# Patient Record
Sex: MALE | Race: WHITE | NOT HISPANIC OR LATINO | Employment: OTHER | URBAN - METROPOLITAN AREA
[De-identification: names, ages, dates, MRNs, and addresses within clinical notes are randomized per-mention and may not be internally consistent; named-entity substitution may affect disease eponyms.]

---

## 2017-01-19 PROBLEM — M54.12 CERVICAL RADICULOPATHY: Status: ACTIVE | Noted: 2017-01-19

## 2017-01-19 PROBLEM — R60.9 EDEMA: Status: ACTIVE | Noted: 2017-01-19

## 2017-01-19 PROBLEM — R41.3 LOM (LOSS OF MEMORY): Status: ACTIVE | Noted: 2017-01-19

## 2017-01-19 PROBLEM — K21.9 GERD (GASTROESOPHAGEAL REFLUX DISEASE): Status: ACTIVE | Noted: 2017-01-19

## 2017-01-19 PROBLEM — R06.02 SHORTNESS OF BREATH: Status: ACTIVE | Noted: 2017-01-19

## 2017-01-19 PROBLEM — M54.12 BRACHIAL NEURITIS: Status: ACTIVE | Noted: 2017-01-19

## 2017-01-19 PROBLEM — G40.909 SEIZURE DISORDER (HCC): Status: ACTIVE | Noted: 2017-01-19

## 2017-01-19 PROBLEM — R00.2 PALPITATIONS: Status: ACTIVE | Noted: 2017-01-19

## 2017-01-19 PROBLEM — R07.9 CHEST PAIN: Status: ACTIVE | Noted: 2017-01-19

## 2017-01-19 PROBLEM — I10 HYPERTENSION: Status: ACTIVE | Noted: 2017-01-19

## 2017-02-03 ENCOUNTER — HOSPITAL ENCOUNTER (EMERGENCY)
Facility: HOSPITAL | Age: 57
Discharge: HOME | End: 2017-02-03
Attending: EMERGENCY MEDICINE | Admitting: EMERGENCY MEDICINE

## 2017-02-03 ENCOUNTER — APPOINTMENT (OUTPATIENT)
Dept: GENERAL RADIOLOGY | Facility: HOSPITAL | Age: 57
End: 2017-02-03

## 2017-02-03 ENCOUNTER — APPOINTMENT (OUTPATIENT)
Dept: MRI IMAGING | Facility: HOSPITAL | Age: 57
End: 2017-02-03

## 2017-02-03 ENCOUNTER — APPOINTMENT (OUTPATIENT)
Dept: CT IMAGING | Facility: HOSPITAL | Age: 57
End: 2017-02-03

## 2017-02-03 VITALS
TEMPERATURE: 98.7 F | BODY MASS INDEX: 37.37 KG/M2 | HEART RATE: 80 BPM | DIASTOLIC BLOOD PRESSURE: 70 MMHG | HEIGHT: 70 IN | OXYGEN SATURATION: 98 % | RESPIRATION RATE: 16 BRPM | WEIGHT: 261 LBS | SYSTOLIC BLOOD PRESSURE: 120 MMHG

## 2017-02-03 DIAGNOSIS — G43.001 MIGRAINE WITHOUT AURA AND WITH STATUS MIGRAINOSUS, NOT INTRACTABLE: Primary | ICD-10-CM

## 2017-02-03 DIAGNOSIS — R53.1 WEAKNESS: ICD-10-CM

## 2017-02-03 DIAGNOSIS — R42 DIZZINESS: ICD-10-CM

## 2017-02-03 LAB
ALBUMIN SERPL-MCNC: 4.3 G/DL (ref 3.2–4.8)
ALBUMIN/GLOB SERPL: 1.7 G/DL (ref 1.5–2.5)
ALP SERPL-CCNC: 60 U/L (ref 25–100)
ALT SERPL W P-5'-P-CCNC: 27 U/L (ref 7–40)
ANION GAP SERPL CALCULATED.3IONS-SCNC: 11 MMOL/L (ref 3–11)
AST SERPL-CCNC: 23 U/L (ref 0–33)
BASOPHILS # BLD AUTO: 0.02 10*3/MM3 (ref 0–0.2)
BASOPHILS NFR BLD AUTO: 0.4 % (ref 0–1)
BILIRUB SERPL-MCNC: 0.6 MG/DL (ref 0.3–1.2)
BILIRUB UR QL STRIP: NEGATIVE
BUN BLD-MCNC: 13 MG/DL (ref 9–23)
BUN/CREAT SERPL: 16.3 (ref 7–25)
CALCIUM SPEC-SCNC: 9.9 MG/DL (ref 8.7–10.4)
CHLORIDE SERPL-SCNC: 103 MMOL/L (ref 99–109)
CLARITY UR: CLEAR
CO2 SERPL-SCNC: 25 MMOL/L (ref 20–31)
COLOR UR: YELLOW
CREAT BLD-MCNC: 0.8 MG/DL (ref 0.6–1.3)
DEPRECATED RDW RBC AUTO: 42.8 FL (ref 37–54)
EOSINOPHIL # BLD AUTO: 0.15 10*3/MM3 (ref 0.1–0.3)
EOSINOPHIL NFR BLD AUTO: 2.8 % (ref 0–3)
ERYTHROCYTE [DISTWIDTH] IN BLOOD BY AUTOMATED COUNT: 14 % (ref 11.3–14.5)
GFR SERPL CREATININE-BSD FRML MDRD: 100 ML/MIN/1.73
GLOBULIN UR ELPH-MCNC: 2.6 GM/DL
GLUCOSE BLD-MCNC: 194 MG/DL (ref 70–100)
GLUCOSE UR STRIP-MCNC: ABNORMAL MG/DL
HCT VFR BLD AUTO: 42.1 % (ref 38.9–50.9)
HGB BLD-MCNC: 14.6 G/DL (ref 13.1–17.5)
HGB UR QL STRIP.AUTO: NEGATIVE
HOLD SPECIMEN: NORMAL
IMM GRANULOCYTES # BLD: 0.06 10*3/MM3 (ref 0–0.03)
IMM GRANULOCYTES NFR BLD: 1.1 % (ref 0–0.6)
KETONES UR QL STRIP: ABNORMAL
LEUKOCYTE ESTERASE UR QL STRIP.AUTO: NEGATIVE
LYMPHOCYTES # BLD AUTO: 2.15 10*3/MM3 (ref 0.6–4.8)
LYMPHOCYTES NFR BLD AUTO: 40.8 % (ref 24–44)
MCH RBC QN AUTO: 29.3 PG (ref 27–31)
MCHC RBC AUTO-ENTMCNC: 34.7 G/DL (ref 32–36)
MCV RBC AUTO: 84.4 FL (ref 80–99)
MONOCYTES # BLD AUTO: 0.45 10*3/MM3 (ref 0–1)
MONOCYTES NFR BLD AUTO: 8.5 % (ref 0–12)
NEUTROPHILS # BLD AUTO: 2.44 10*3/MM3 (ref 1.5–8.3)
NEUTROPHILS NFR BLD AUTO: 46.4 % (ref 41–71)
NITRITE UR QL STRIP: NEGATIVE
PH UR STRIP.AUTO: 5.5 [PH] (ref 5–8)
PLATELET # BLD AUTO: 139 10*3/MM3 (ref 150–450)
PMV BLD AUTO: 9.2 FL (ref 6–12)
POTASSIUM BLD-SCNC: 3.9 MMOL/L (ref 3.5–5.5)
PROT SERPL-MCNC: 6.9 G/DL (ref 5.7–8.2)
PROT UR QL STRIP: NEGATIVE
RBC # BLD AUTO: 4.99 10*6/MM3 (ref 4.2–5.76)
SODIUM BLD-SCNC: 139 MMOL/L (ref 132–146)
SP GR UR STRIP: 1.04 (ref 1–1.03)
TROPONIN I SERPL-MCNC: 0 NG/ML (ref 0–0.07)
TROPONIN I SERPL-MCNC: 0 NG/ML (ref 0–0.07)
UROBILINOGEN UR QL STRIP: ABNORMAL
VALPROATE SERPL-MCNC: 56 MCG/ML (ref 50–150)
WBC NRBC COR # BLD: 5.27 10*3/MM3 (ref 3.5–10.8)
WHOLE BLOOD HOLD SPECIMEN: NORMAL
WHOLE BLOOD HOLD SPECIMEN: NORMAL

## 2017-02-03 PROCEDURE — 70450 CT HEAD/BRAIN W/O DYE: CPT

## 2017-02-03 PROCEDURE — 25010000002 DIPHENHYDRAMINE PER 50 MG: Performed by: EMERGENCY MEDICINE

## 2017-02-03 PROCEDURE — 80164 ASSAY DIPROPYLACETIC ACD TOT: CPT | Performed by: EMERGENCY MEDICINE

## 2017-02-03 PROCEDURE — 25010000002 DEXAMETHASONE PER 1 MG: Performed by: EMERGENCY MEDICINE

## 2017-02-03 PROCEDURE — 25010000002 LORAZEPAM PER 2 MG: Performed by: EMERGENCY MEDICINE

## 2017-02-03 PROCEDURE — 96376 TX/PRO/DX INJ SAME DRUG ADON: CPT

## 2017-02-03 PROCEDURE — 96375 TX/PRO/DX INJ NEW DRUG ADDON: CPT

## 2017-02-03 PROCEDURE — 84484 ASSAY OF TROPONIN QUANT: CPT

## 2017-02-03 PROCEDURE — 93005 ELECTROCARDIOGRAM TRACING: CPT | Performed by: EMERGENCY MEDICINE

## 2017-02-03 PROCEDURE — 81003 URINALYSIS AUTO W/O SCOPE: CPT | Performed by: EMERGENCY MEDICINE

## 2017-02-03 PROCEDURE — 71010 HC CHEST PA OR AP: CPT

## 2017-02-03 PROCEDURE — 96365 THER/PROPH/DIAG IV INF INIT: CPT

## 2017-02-03 PROCEDURE — 99285 EMERGENCY DEPT VISIT HI MDM: CPT

## 2017-02-03 PROCEDURE — 80053 COMPREHEN METABOLIC PANEL: CPT | Performed by: EMERGENCY MEDICINE

## 2017-02-03 PROCEDURE — 70551 MRI BRAIN STEM W/O DYE: CPT

## 2017-02-03 PROCEDURE — 25010000002 KETOROLAC TROMETHAMINE PER 15 MG: Performed by: EMERGENCY MEDICINE

## 2017-02-03 PROCEDURE — 85025 COMPLETE CBC W/AUTO DIFF WBC: CPT | Performed by: EMERGENCY MEDICINE

## 2017-02-03 PROCEDURE — 25010000002 METOCLOPRAMIDE PER 10 MG: Performed by: EMERGENCY MEDICINE

## 2017-02-03 RX ORDER — KETOROLAC TROMETHAMINE 15 MG/ML
10 INJECTION, SOLUTION INTRAMUSCULAR; INTRAVENOUS ONCE
Status: COMPLETED | OUTPATIENT
Start: 2017-02-03 | End: 2017-02-03

## 2017-02-03 RX ORDER — LORAZEPAM 2 MG/ML
0.5 INJECTION INTRAMUSCULAR ONCE
Status: COMPLETED | OUTPATIENT
Start: 2017-02-03 | End: 2017-02-03

## 2017-02-03 RX ORDER — DIPHENHYDRAMINE HYDROCHLORIDE 50 MG/ML
12.5 INJECTION INTRAMUSCULAR; INTRAVENOUS ONCE
Status: COMPLETED | OUTPATIENT
Start: 2017-02-03 | End: 2017-02-03

## 2017-02-03 RX ORDER — BUTALBITAL, ACETAMINOPHEN AND CAFFEINE 50; 325; 40 MG/1; MG/1; MG/1
1-2 TABLET ORAL EVERY 6 HOURS PRN
Qty: 12 TABLET | Refills: 0 | Status: ON HOLD | OUTPATIENT
Start: 2017-02-03 | End: 2017-04-18

## 2017-02-03 RX ORDER — METOCLOPRAMIDE HYDROCHLORIDE 5 MG/ML
5 INJECTION INTRAMUSCULAR; INTRAVENOUS ONCE
Status: COMPLETED | OUTPATIENT
Start: 2017-02-03 | End: 2017-02-03

## 2017-02-03 RX ADMIN — SODIUM CHLORIDE 500 ML: 9 INJECTION, SOLUTION INTRAVENOUS at 21:22

## 2017-02-03 RX ADMIN — LORAZEPAM 0.5 MG: 2 INJECTION INTRAMUSCULAR; INTRAVENOUS at 21:22

## 2017-02-03 RX ADMIN — DEXAMETHASONE SODIUM PHOSPHATE 5 MG: 10 INJECTION INTRAMUSCULAR; INTRAVENOUS at 21:51

## 2017-02-03 RX ADMIN — KETOROLAC TROMETHAMINE 10 MG: 15 INJECTION, SOLUTION INTRAMUSCULAR; INTRAVENOUS at 21:22

## 2017-02-03 RX ADMIN — DIPHENHYDRAMINE HYDROCHLORIDE 12.5 MG: 50 INJECTION INTRAMUSCULAR; INTRAVENOUS at 21:22

## 2017-02-03 RX ADMIN — METOCLOPRAMIDE 5 MG: 5 INJECTION, SOLUTION INTRAMUSCULAR; INTRAVENOUS at 21:21

## 2017-02-03 RX ADMIN — KETOROLAC TROMETHAMINE 10 MG: 15 INJECTION, SOLUTION INTRAMUSCULAR; INTRAVENOUS at 22:37

## 2017-02-03 NOTE — ED PROVIDER NOTES
Subjective   HPI Comments: Denzel Harding is a 56 y.o. male presenting to the ED with c/o neurologic problem. Mr. Harding reports that he had a sudden onset of generalized weakness and dizziness at 1740 earlier this afternoon. He reports that it was shortly followed by tingling and numbness on his entire left side and face. He also began having slurred speech, prompting his presentation to the ED. He is currently complaining of a headache as well. Denies any  loss of consciousness, or altered mental status. No other complaints at this time.    His last known well is at 1740 this afternoon.    Patient is a 56 y.o. male presenting with neurologic complaint.   History provided by:  Patient  Neurologic Problem   The patient's primary symptoms include focal sensory loss, focal weakness, slurred speech and weakness. The patient's pertinent negatives include no altered mental status, loss of balance, memory loss, near-syncope, syncope or visual change. This is a new problem. The current episode started today. The neurological problem developed suddenly. The last time the patient was known to be well was 2/3/2017 5:40 PM.  The problem is unchanged. There was facial, lower extremity, upper extremity and left-sided focality noted. Associated symptoms include dizziness and headaches. Pertinent negatives include no abdominal pain, back pain, chest pain, fatigue, fever, light-headedness, nausea, shortness of breath or vomiting.       Review of Systems   Constitutional: Negative for chills, fatigue and fever.   HENT: Negative for rhinorrhea.    Respiratory: Negative for cough and shortness of breath.    Cardiovascular: Negative for chest pain, leg swelling and near-syncope.   Gastrointestinal: Negative for abdominal pain, diarrhea, nausea and vomiting.   Genitourinary: Negative for dysuria.   Musculoskeletal: Negative for back pain and joint swelling.   Skin: Negative for color change.   Neurological: Positive for dizziness,  focal weakness, speech difficulty, weakness, numbness and headaches. Negative for syncope, light-headedness and loss of balance.   Psychiatric/Behavioral: Negative for memory loss.   All other systems reviewed and are negative.      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 EF of 60-65%. Grade 1 diastolic dysfunction of the LV myocardium. No evidence of pericardial effusion   • History of herniated intervertebral disc      History of left L5-S1 disc herniation   • Hypercholesterolemia 11/2/2016   • Hypertension    • LOM (loss of memory) 1/19/2017   • Lower back pain      Right   • Measles    • Obstructive sleep apnea    • Palpitations    • Seizure disorder    • Shortness of breath 1/19/2017   • SOB (shortness of breath)    • Stroke        Allergies   Allergen Reactions   • Sulfa Antibiotics Anaphylaxis, Nausea And Vomiting and Delirium   • Codeine Nausea And Vomiting     Can only take with Phenergan   • Morphine      Does not work. It causes pain       Past Surgical History   Procedure Laterality Date   • Coronary angioplasty with stent placement     • Cholecystectomy     • Knee arthroscopy Bilateral    • Carpal tunnel release Right    • Neuroplasty / transposition ulnar nerve at elbow     • Cardiac catheterization     • Other surgical history       Foraminotomy and discectomy       Family History   Problem Relation Age of Onset   • Hypertension Mother    • Alcohol abuse Father    • Heart disease Other    • Stroke Other    • Hypertension Other    • Other Other      Neurologic disorder   • Parkinsonism Other    • Stroke  Other    • Heart disease Other    • Hypertension Other    • Heart disease Other    • Stroke Other    • Hypertension Other        Social History     Social History   • Marital status:      Spouse name: N/A   • Number of children: N/A   • Years of education: N/A     Social History Main Topics   • Smoking status: Former Smoker     Quit date: 1/1/1998   • Smokeless tobacco: None   • Alcohol use Yes      Comment: 3 PER YEAR   • Drug use: No   • Sexual activity: Defer     Other Topics Concern   • None     Social History Narrative   • None         Objective   Physical Exam   Constitutional: He is oriented to person, place, and time. He appears well-developed and well-nourished. No distress.   HENT:   Head: Normocephalic and atraumatic.   Eyes: Conjunctivae and EOM are normal.   Neck: Normal range of motion. Neck supple.   Cardiovascular: Normal rate, regular rhythm, normal heart sounds and intact distal pulses.    Good pulses.   Pulmonary/Chest: Effort normal and breath sounds normal. No respiratory distress. He exhibits no tenderness.   Abdominal: Soft. Bowel sounds are normal. There is no tenderness.   Moderately obese.   Musculoskeletal: Normal range of motion. He exhibits no edema or tenderness.   Lymphadenopathy:     He has no cervical adenopathy.   Neurological: He is alert and oriented to person, place, and time.   Reflex Scores:       Patellar reflexes are 1+ on the right side and 1+ on the left side.  Face symmetric. Tongue midline. Voice strong. Vision, hearing and speech preserved.   He was able to stand up from the wheelchair without assistance, felt dizzy when he did so.  Moved both arms normally and could take shirt off without assistance, and could get onto the CT gantry.   strength symmetrical.   Skin: Skin is warm and dry. No erythema.   Psychiatric: He has a normal mood and affect. His behavior is normal.   Nursing note and vitals reviewed.      Procedures         ED Course  ED Course   Comment  By Time   Mr. Harding's MRI shows no evidence of an acute infarct, per Dr. Carmona's interpretation. David Hinds 02/03 2024                     MDM  Number of Diagnoses or Management Options  Dizziness:   Migraine without aura and with status migrainosus, not intractable:   Weakness:   Diagnosis management comments:         I reviewed all available studies at the bedside with the patient and his family.  They are reassuring.  His MRI shows no evidence of either current or previous stroke.    In review of his previous medical records and discussion with he and his family apparently this is one of many episodes he's had negative been very similar.    He has had no seizure-like activity here and his valproic acid is therapeutic.    He has a fairly severe headache at this point and I favor a complex migraine.  He is seen Dr. Shoemaker in the past for migraines.    His neurologic exam is normal currently.  I've treated him for migraines and he feels better.    His previous MRIs have been reassuring as is one today.    I will discharge him to home on seemed Fioricet and he'll continue his current medications now call Dr. Shoemaker on Monday for follow-up and also follow up with his primary care doctor for optimization of his sugar and blood pressure control.    He'll return to the ER if worse in any way.  All are agreeable with the plan.       Amount and/or Complexity of Data Reviewed  Clinical lab tests: reviewed  Tests in the radiology section of CPT®: reviewed  Tests in the medicine section of CPT®: reviewed        Final diagnoses:   Migraine without aura and with status migrainosus, not intractable   Weakness   Dizziness       Documentation assistance provided by wen Hinds.  Information recorded by the scribe was done at my direction and has been verified and validated by me.     David Hinds  02/03/17 1818       Nasir Carmona MD  02/03/17 2057

## 2017-02-04 NOTE — DISCHARGE INSTRUCTIONS

## 2017-02-21 ENCOUNTER — OFFICE VISIT (OUTPATIENT)
Dept: NEUROLOGY | Facility: CLINIC | Age: 57
End: 2017-02-21

## 2017-02-21 VITALS
BODY MASS INDEX: 37.19 KG/M2 | HEART RATE: 79 BPM | DIASTOLIC BLOOD PRESSURE: 68 MMHG | HEIGHT: 70 IN | WEIGHT: 259.8 LBS | OXYGEN SATURATION: 94 % | SYSTOLIC BLOOD PRESSURE: 118 MMHG

## 2017-02-21 DIAGNOSIS — E11.42 DIABETIC POLYNEUROPATHY ASSOCIATED WITH TYPE 2 DIABETES MELLITUS (HCC): ICD-10-CM

## 2017-02-21 DIAGNOSIS — G43.C0 PERIODIC HEADACHE SYNDROME, NOT INTRACTABLE: Primary | ICD-10-CM

## 2017-02-21 PROBLEM — G40.909 SEIZURE DISORDER (HCC): Status: RESOLVED | Noted: 2017-01-19 | Resolved: 2017-02-21

## 2017-02-21 PROCEDURE — 99214 OFFICE O/P EST MOD 30 MIN: CPT | Performed by: PSYCHIATRY & NEUROLOGY

## 2017-02-21 RX ORDER — SUMATRIPTAN 100 MG/1
TABLET, FILM COATED ORAL
Qty: 12 TABLET | Refills: 6 | Status: SHIPPED | OUTPATIENT
Start: 2017-02-21 | End: 2017-05-03

## 2017-02-21 RX ORDER — MONTELUKAST SODIUM 10 MG/1
1 TABLET ORAL DAILY
Refills: 0 | COMMUNITY
Start: 2017-02-03 | End: 2017-05-24 | Stop reason: SDUPTHER

## 2017-02-21 RX ORDER — TOPIRAMATE 25 MG/1
25 TABLET ORAL SEE ADMIN INSTRUCTIONS
Qty: 120 TABLET | Refills: 3 | Status: SHIPPED | OUTPATIENT
Start: 2017-02-21 | End: 2017-03-23

## 2017-02-21 NOTE — ASSESSMENT & PLAN NOTE
Headaches are worsening.  Medication changes per orders.   Continue Depakote ER 1500 mg qhs  Add Imitrex and TPM

## 2017-02-21 NOTE — PROGRESS NOTES
"Subjective:     Patient ID: Denzel Harding is a 56 y.o. male.    History of Present Illness     55 yo male with migraines and DMPN.  Last visit on 11/11/16 continued Depakote and GBP.      Interval history:  Presented to ED on 2/3/17 with generalized weakness, slurred speech and dizziness with N/T on left side.  Sx followed by severe HA.  Sx resolved in ED and discharged home.    MRI Brain, my review of films, atrophy with mild SVDz  VPA level 56  Gluc 194; ; ECG NSR    Migraines     Head continues to hurt with unsteady sensation.  Above right eye has sharp stabbing sensation.  Taking OTC meds with short term relief.  Moderate intensity.  Tolerating Depakote and GBP.  Took an Imitrex from [facility] with significant improvement in HA.     DMPN    Feet burning and stinging at night improved.  Working part time up on feet.     PMH:    11/12/2015 Dr Horowitz performed left L5/S1 discectomy followed by wound infection     4/22/16 presented to ED with weakness and impaired speech.  MRI Brain, my review, minimal small vessel disease.    8/3/16 right shoulder arthroscopy with labral debridement.    The following portions of the patient's history were reviewed and updated as appropriate: allergies, current medications, past medical history, past surgical history and problem list.    Review of Systems   Musculoskeletal: Positive for arthralgias and myalgias.   Neurological: Positive for numbness.        Objective:  Vitals:    02/21/17 1111   BP: 118/68   Pulse: 79   SpO2: 94%   Weight: 259 lb 12.8 oz (118 kg)   Height: 70\" (177.8 cm)         Neurologic Exam     Mental Status   Oriented to person, place, and time.   Speech: speech is normal   Level of consciousness: alert  Knowledge: good and consistent with education.   Normal comprehension.     Cranial Nerves   Cranial nerves II through XII intact.     CN II   Visual fields full to confrontation.   Visual acuity: normal  Right visual field deficit: none  Left visual field " deficit: none     CN III, IV, VI   Pupils are equal, round, and reactive to light.  Extraocular motions are normal.   Nystagmus: none   Diplopia: none  Ophthalmoparesis: none  Upgaze: normal  Downgaze: normal  Conjugate gaze: present    CN V   Facial sensation intact.   Right corneal reflex: normal  Left corneal reflex: normal    CN VII   Right facial weakness: none  Left facial weakness: none    CN VIII   Hearing: intact    CN IX, X   Palate: symmetric  Right gag reflex: normal  Left gag reflex: normal    CN XI   Right sternocleidomastoid strength: normal  Left sternocleidomastoid strength: normal    CN XII   Tongue: not atrophic  Fasciculations: absent  Tongue deviation: none    Motor Exam   Muscle bulk: normal  Overall muscle tone: normal    Strength   Strength 5/5 throughout.     Sensory Exam   Right leg light touch: decreased from knee  Left leg light touch: decreased from knee  Right leg pinprick: decreased from knee  Left leg pinprick: decreased from knee    Gait, Coordination, and Reflexes     Gait  Gait: normal    Tremor   Resting tremor: absent  Intention tremor: absent  Action tremor: absent    Reflexes   Reflexes 2+ except as noted.   Right patellar: 0  Left patellar: 0  Left achilles: 0      Physical Exam   Constitutional: He is oriented to person, place, and time.   Eyes: EOM are normal. Pupils are equal, round, and reactive to light.   Neurological: He is oriented to person, place, and time. He has normal strength. Gait normal.   Reflex Scores:       Patellar reflexes are 0 on the right side and 0 on the left side.       Achilles reflexes are 0 on the left side.  Psychiatric: His speech is normal.       Assessment/Plan:       Problems Addressed this Visit        Cardiovascular and Mediastinum    Periodic headache syndrome, not intractable - Primary     Headaches are worsening.  Medication changes per orders.   Continue Depakote ER 1500 mg qhs  Add Imitrex and TPM             Endocrine    Diabetic  polyneuropathy associated with type 2 diabetes mellitus     Stable on  mg TID

## 2017-02-23 RX ORDER — FLUNISOLIDE 0.25 MG/ML
SOLUTION NASAL
Qty: 1 BOTTLE | Refills: 0 | Status: SHIPPED | OUTPATIENT
Start: 2017-02-23 | End: 2017-06-12

## 2017-04-11 ENCOUNTER — APPOINTMENT (OUTPATIENT)
Dept: GENERAL RADIOLOGY | Facility: HOSPITAL | Age: 57
End: 2017-04-11

## 2017-04-11 ENCOUNTER — HOSPITAL ENCOUNTER (OUTPATIENT)
Facility: HOSPITAL | Age: 57
Setting detail: OBSERVATION
LOS: 1 days | Discharge: HOME | End: 2017-04-18
Attending: EMERGENCY MEDICINE | Admitting: INTERNAL MEDICINE

## 2017-04-11 ENCOUNTER — APPOINTMENT (OUTPATIENT)
Dept: MRI IMAGING | Facility: HOSPITAL | Age: 57
End: 2017-04-11

## 2017-04-11 DIAGNOSIS — Z74.09 IMPAIRED FUNCTIONAL MOBILITY, BALANCE, GAIT, AND ENDURANCE: ICD-10-CM

## 2017-04-11 DIAGNOSIS — Z74.09 IMPAIRED MOBILITY AND ADLS: ICD-10-CM

## 2017-04-11 DIAGNOSIS — R51.9 INTRACTABLE EPISODIC HEADACHE, UNSPECIFIED HEADACHE TYPE: ICD-10-CM

## 2017-04-11 DIAGNOSIS — Z78.9 IMPAIRED MOBILITY AND ADLS: ICD-10-CM

## 2017-04-11 DIAGNOSIS — R53.1 WEAKNESS GENERALIZED: Primary | ICD-10-CM

## 2017-04-11 PROBLEM — F41.9 ANXIETY: Chronic | Status: ACTIVE | Noted: 2017-04-11

## 2017-04-11 PROBLEM — E11.9 DIABETES MELLITUS (HCC): Chronic | Status: ACTIVE | Noted: 2017-04-11

## 2017-04-11 PROBLEM — M54.50 LOWER BACK PAIN: Chronic | Status: ACTIVE | Noted: 2017-04-11

## 2017-04-11 LAB
ALBUMIN SERPL-MCNC: 4.3 G/DL (ref 3.2–4.8)
ALBUMIN SERPL-MCNC: 4.3 G/DL (ref 3.2–4.8)
ALBUMIN/GLOB SERPL: 1.7 G/DL (ref 1.5–2.5)
ALBUMIN/GLOB SERPL: 1.7 G/DL (ref 1.5–2.5)
ALP SERPL-CCNC: 43 U/L (ref 25–100)
ALP SERPL-CCNC: 44 U/L (ref 25–100)
ALT SERPL W P-5'-P-CCNC: 46 U/L (ref 7–40)
ALT SERPL W P-5'-P-CCNC: 48 U/L (ref 7–40)
ANION GAP SERPL CALCULATED.3IONS-SCNC: 4 MMOL/L (ref 3–11)
ANION GAP SERPL CALCULATED.3IONS-SCNC: 6 MMOL/L (ref 3–11)
AST SERPL-CCNC: 33 U/L (ref 0–33)
AST SERPL-CCNC: 35 U/L (ref 0–33)
BASOPHILS # BLD AUTO: 0.04 10*3/MM3 (ref 0–0.2)
BASOPHILS # BLD AUTO: 0.05 10*3/MM3 (ref 0–0.2)
BASOPHILS NFR BLD AUTO: 0.7 % (ref 0–1)
BASOPHILS NFR BLD AUTO: 0.8 % (ref 0–1)
BILIRUB SERPL-MCNC: 0.7 MG/DL (ref 0.3–1.2)
BILIRUB SERPL-MCNC: 0.7 MG/DL (ref 0.3–1.2)
BILIRUB UR QL STRIP: NEGATIVE
BNP SERPL-MCNC: 19 PG/ML (ref 0–100)
BUN BLD-MCNC: 13 MG/DL (ref 9–23)
BUN BLD-MCNC: 14 MG/DL (ref 9–23)
BUN/CREAT SERPL: 16.3 (ref 7–25)
BUN/CREAT SERPL: 17.5 (ref 7–25)
CALCIUM SPEC-SCNC: 10 MG/DL (ref 8.7–10.4)
CALCIUM SPEC-SCNC: 10 MG/DL (ref 8.7–10.4)
CHLORIDE SERPL-SCNC: 106 MMOL/L (ref 99–109)
CHLORIDE SERPL-SCNC: 109 MMOL/L (ref 99–109)
CK SERPL-CCNC: 66 U/L (ref 26–174)
CLARITY UR: CLEAR
CO2 SERPL-SCNC: 27 MMOL/L (ref 20–31)
CO2 SERPL-SCNC: 28 MMOL/L (ref 20–31)
COLOR UR: YELLOW
CREAT BLD-MCNC: 0.8 MG/DL (ref 0.6–1.3)
CREAT BLD-MCNC: 0.8 MG/DL (ref 0.6–1.3)
CRP SERPL-MCNC: 0.43 MG/DL (ref 0–1)
DEPRECATED RDW RBC AUTO: 44.5 FL (ref 37–54)
DEPRECATED RDW RBC AUTO: 44.5 FL (ref 37–54)
EOSINOPHIL # BLD AUTO: 0.22 10*3/MM3 (ref 0.1–0.3)
EOSINOPHIL # BLD AUTO: 0.23 10*3/MM3 (ref 0.1–0.3)
EOSINOPHIL NFR BLD AUTO: 3.6 % (ref 0–3)
EOSINOPHIL NFR BLD AUTO: 3.8 % (ref 0–3)
ERYTHROCYTE [DISTWIDTH] IN BLOOD BY AUTOMATED COUNT: 14 % (ref 11.3–14.5)
ERYTHROCYTE [DISTWIDTH] IN BLOOD BY AUTOMATED COUNT: 14 % (ref 11.3–14.5)
ERYTHROCYTE [SEDIMENTATION RATE] IN BLOOD: 4 MM/HR (ref 0–20)
FLUAV AG NPH QL: NEGATIVE
FLUBV AG NPH QL IA: NEGATIVE
GFR SERPL CREATININE-BSD FRML MDRD: 100 ML/MIN/1.73
GFR SERPL CREATININE-BSD FRML MDRD: 100 ML/MIN/1.73
GLOBULIN UR ELPH-MCNC: 2.5 GM/DL
GLOBULIN UR ELPH-MCNC: 2.5 GM/DL
GLUCOSE BLD-MCNC: 181 MG/DL (ref 70–100)
GLUCOSE BLD-MCNC: 190 MG/DL (ref 70–100)
GLUCOSE BLDC GLUCOMTR-MCNC: 149 MG/DL (ref 70–130)
GLUCOSE BLDC GLUCOMTR-MCNC: 209 MG/DL (ref 70–130)
GLUCOSE UR STRIP-MCNC: ABNORMAL MG/DL
HCT VFR BLD AUTO: 43.5 % (ref 38.9–50.9)
HCT VFR BLD AUTO: 43.8 % (ref 38.9–50.9)
HGB BLD-MCNC: 14.5 G/DL (ref 13.1–17.5)
HGB BLD-MCNC: 14.6 G/DL (ref 13.1–17.5)
HGB UR QL STRIP.AUTO: NEGATIVE
HOLD SPECIMEN: NORMAL
HOLD SPECIMEN: NORMAL
IMM GRANULOCYTES # BLD: 0.03 10*3/MM3 (ref 0–0.03)
IMM GRANULOCYTES # BLD: 0.04 10*3/MM3 (ref 0–0.03)
IMM GRANULOCYTES NFR BLD: 0.5 % (ref 0–0.6)
IMM GRANULOCYTES NFR BLD: 0.6 % (ref 0–0.6)
KETONES UR QL STRIP: ABNORMAL
LEUKOCYTE ESTERASE UR QL STRIP.AUTO: NEGATIVE
LIPASE SERPL-CCNC: 28 U/L (ref 6–51)
LYMPHOCYTES # BLD AUTO: 2.04 10*3/MM3 (ref 0.6–4.8)
LYMPHOCYTES # BLD AUTO: 2.33 10*3/MM3 (ref 0.6–4.8)
LYMPHOCYTES NFR BLD AUTO: 35.1 % (ref 24–44)
LYMPHOCYTES NFR BLD AUTO: 36 % (ref 24–44)
MAGNESIUM SERPL-MCNC: 2.2 MG/DL (ref 1.3–2.7)
MCH RBC QN AUTO: 29 PG (ref 27–31)
MCH RBC QN AUTO: 29.1 PG (ref 27–31)
MCHC RBC AUTO-ENTMCNC: 33.3 G/DL (ref 32–36)
MCHC RBC AUTO-ENTMCNC: 33.3 G/DL (ref 32–36)
MCV RBC AUTO: 86.9 FL (ref 80–99)
MCV RBC AUTO: 87.2 FL (ref 80–99)
MONOCYTES # BLD AUTO: 0.5 10*3/MM3 (ref 0–1)
MONOCYTES # BLD AUTO: 0.65 10*3/MM3 (ref 0–1)
MONOCYTES NFR BLD AUTO: 10 % (ref 0–12)
MONOCYTES NFR BLD AUTO: 8.6 % (ref 0–12)
NEUTROPHILS # BLD AUTO: 2.98 10*3/MM3 (ref 1.5–8.3)
NEUTROPHILS # BLD AUTO: 3.17 10*3/MM3 (ref 1.5–8.3)
NEUTROPHILS NFR BLD AUTO: 49 % (ref 41–71)
NEUTROPHILS NFR BLD AUTO: 51.3 % (ref 41–71)
NITRITE UR QL STRIP: NEGATIVE
PH UR STRIP.AUTO: 5.5 [PH] (ref 5–8)
PLATELET # BLD AUTO: 136 10*3/MM3 (ref 150–450)
PLATELET # BLD AUTO: 136 10*3/MM3 (ref 150–450)
PMV BLD AUTO: 10.1 FL (ref 6–12)
PMV BLD AUTO: 10.3 FL (ref 6–12)
POTASSIUM BLD-SCNC: 3.5 MMOL/L (ref 3.5–5.5)
POTASSIUM BLD-SCNC: 3.7 MMOL/L (ref 3.5–5.5)
PROT SERPL-MCNC: 6.8 G/DL (ref 5.7–8.2)
PROT SERPL-MCNC: 6.8 G/DL (ref 5.7–8.2)
PROT UR QL STRIP: NEGATIVE
RBC # BLD AUTO: 4.99 10*6/MM3 (ref 4.2–5.76)
RBC # BLD AUTO: 5.04 10*6/MM3 (ref 4.2–5.76)
SODIUM BLD-SCNC: 140 MMOL/L (ref 132–146)
SODIUM BLD-SCNC: 140 MMOL/L (ref 132–146)
SP GR UR STRIP: 1.02 (ref 1–1.03)
T4 FREE SERPL-MCNC: 0.99 NG/DL (ref 0.89–1.76)
TROPONIN I SERPL-MCNC: 0 NG/ML (ref 0–0.07)
TSH SERPL DL<=0.05 MIU/L-ACNC: 1.77 MIU/ML (ref 0.35–5.35)
UROBILINOGEN UR QL STRIP: ABNORMAL
VALPROATE SERPL-MCNC: 62 MCG/ML (ref 50–150)
WBC NRBC COR # BLD: 5.81 10*3/MM3 (ref 3.5–10.8)
WBC NRBC COR # BLD: 6.47 10*3/MM3 (ref 3.5–10.8)
WHOLE BLOOD HOLD SPECIMEN: NORMAL
WHOLE BLOOD HOLD SPECIMEN: NORMAL

## 2017-04-11 PROCEDURE — 25010000002 HYDROMORPHONE PER 4 MG: Performed by: HOSPITALIST

## 2017-04-11 PROCEDURE — 82962 GLUCOSE BLOOD TEST: CPT

## 2017-04-11 PROCEDURE — 80053 COMPREHEN METABOLIC PANEL: CPT | Performed by: EMERGENCY MEDICINE

## 2017-04-11 PROCEDURE — 96375 TX/PRO/DX INJ NEW DRUG ADDON: CPT

## 2017-04-11 PROCEDURE — 84484 ASSAY OF TROPONIN QUANT: CPT

## 2017-04-11 PROCEDURE — 63710000001 INSULIN LISPRO (HUMAN) PER 5 UNITS: Performed by: HOSPITALIST

## 2017-04-11 PROCEDURE — 25010000002 PROMETHAZINE PER 50 MG: Performed by: EMERGENCY MEDICINE

## 2017-04-11 PROCEDURE — G0378 HOSPITAL OBSERVATION PER HR: HCPCS

## 2017-04-11 PROCEDURE — 70551 MRI BRAIN STEM W/O DYE: CPT

## 2017-04-11 PROCEDURE — 96361 HYDRATE IV INFUSION ADD-ON: CPT

## 2017-04-11 PROCEDURE — 99285 EMERGENCY DEPT VISIT HI MDM: CPT

## 2017-04-11 PROCEDURE — 96372 THER/PROPH/DIAG INJ SC/IM: CPT

## 2017-04-11 PROCEDURE — 84439 ASSAY OF FREE THYROXINE: CPT | Performed by: EMERGENCY MEDICINE

## 2017-04-11 PROCEDURE — 80164 ASSAY DIPROPYLACETIC ACD TOT: CPT | Performed by: EMERGENCY MEDICINE

## 2017-04-11 PROCEDURE — 86038 ANTINUCLEAR ANTIBODIES: CPT | Performed by: HOSPITALIST

## 2017-04-11 PROCEDURE — 93005 ELECTROCARDIOGRAM TRACING: CPT | Performed by: EMERGENCY MEDICINE

## 2017-04-11 PROCEDURE — 82550 ASSAY OF CK (CPK): CPT | Performed by: EMERGENCY MEDICINE

## 2017-04-11 PROCEDURE — 99220 PR INITIAL OBSERVATION CARE/DAY 70 MINUTES: CPT | Performed by: HOSPITALIST

## 2017-04-11 PROCEDURE — 85652 RBC SED RATE AUTOMATED: CPT | Performed by: EMERGENCY MEDICINE

## 2017-04-11 PROCEDURE — 87804 INFLUENZA ASSAY W/OPTIC: CPT | Performed by: EMERGENCY MEDICINE

## 2017-04-11 PROCEDURE — 83735 ASSAY OF MAGNESIUM: CPT | Performed by: EMERGENCY MEDICINE

## 2017-04-11 PROCEDURE — 96376 TX/PRO/DX INJ SAME DRUG ADON: CPT

## 2017-04-11 PROCEDURE — 71010 HC CHEST PA OR AP: CPT

## 2017-04-11 PROCEDURE — 87040 BLOOD CULTURE FOR BACTERIA: CPT | Performed by: EMERGENCY MEDICINE

## 2017-04-11 PROCEDURE — 81003 URINALYSIS AUTO W/O SCOPE: CPT | Performed by: EMERGENCY MEDICINE

## 2017-04-11 PROCEDURE — 25010000002 HYDROMORPHONE PER 4 MG: Performed by: EMERGENCY MEDICINE

## 2017-04-11 PROCEDURE — 86140 C-REACTIVE PROTEIN: CPT | Performed by: EMERGENCY MEDICINE

## 2017-04-11 PROCEDURE — 83690 ASSAY OF LIPASE: CPT | Performed by: EMERGENCY MEDICINE

## 2017-04-11 PROCEDURE — 83880 ASSAY OF NATRIURETIC PEPTIDE: CPT | Performed by: EMERGENCY MEDICINE

## 2017-04-11 PROCEDURE — 63710000001 INSULIN DETEMIR PER 5 UNITS: Performed by: HOSPITALIST

## 2017-04-11 PROCEDURE — 84443 ASSAY THYROID STIM HORMONE: CPT | Performed by: EMERGENCY MEDICINE

## 2017-04-11 PROCEDURE — 25010000002 DIPHENHYDRAMINE PER 50 MG: Performed by: EMERGENCY MEDICINE

## 2017-04-11 PROCEDURE — 85025 COMPLETE CBC W/AUTO DIFF WBC: CPT | Performed by: EMERGENCY MEDICINE

## 2017-04-11 PROCEDURE — 25010000002 HEPARIN (PORCINE) PER 1000 UNITS: Performed by: HOSPITALIST

## 2017-04-11 RX ORDER — SODIUM CHLORIDE 0.9 % (FLUSH) 0.9 %
10 SYRINGE (ML) INJECTION AS NEEDED
Status: DISCONTINUED | OUTPATIENT
Start: 2017-04-11 | End: 2017-04-18 | Stop reason: HOSPADM

## 2017-04-11 RX ORDER — DIVALPROEX SODIUM 500 MG/1
1500 TABLET, EXTENDED RELEASE ORAL NIGHTLY
Status: DISCONTINUED | OUTPATIENT
Start: 2017-04-11 | End: 2017-04-12

## 2017-04-11 RX ORDER — HYDROCODONE BITARTRATE AND ACETAMINOPHEN 7.5; 325 MG/1; MG/1
1 TABLET ORAL EVERY 4 HOURS PRN
Status: DISCONTINUED | OUTPATIENT
Start: 2017-04-11 | End: 2017-04-18 | Stop reason: HOSPADM

## 2017-04-11 RX ORDER — HEPARIN SODIUM 5000 [USP'U]/ML
5000 INJECTION, SOLUTION INTRAVENOUS; SUBCUTANEOUS EVERY 8 HOURS SCHEDULED
Status: DISCONTINUED | OUTPATIENT
Start: 2017-04-11 | End: 2017-04-18 | Stop reason: HOSPADM

## 2017-04-11 RX ORDER — LISINOPRIL 20 MG/1
40 TABLET ORAL DAILY
Status: DISCONTINUED | OUTPATIENT
Start: 2017-04-11 | End: 2017-04-18 | Stop reason: HOSPADM

## 2017-04-11 RX ORDER — DIPHENHYDRAMINE HYDROCHLORIDE 50 MG/ML
25 INJECTION INTRAMUSCULAR; INTRAVENOUS ONCE
Status: COMPLETED | OUTPATIENT
Start: 2017-04-11 | End: 2017-04-11

## 2017-04-11 RX ORDER — MONTELUKAST SODIUM 10 MG/1
10 TABLET ORAL NIGHTLY
Status: DISCONTINUED | OUTPATIENT
Start: 2017-04-11 | End: 2017-04-18 | Stop reason: HOSPADM

## 2017-04-11 RX ORDER — TRAMADOL HYDROCHLORIDE 50 MG/1
50 TABLET ORAL EVERY 6 HOURS PRN
Status: DISCONTINUED | OUTPATIENT
Start: 2017-04-11 | End: 2017-04-18 | Stop reason: HOSPADM

## 2017-04-11 RX ORDER — TOPIRAMATE 100 MG/1
100 TABLET, FILM COATED ORAL DAILY
Status: ON HOLD | COMMUNITY
End: 2017-04-18

## 2017-04-11 RX ORDER — PROMETHAZINE HYDROCHLORIDE 25 MG/ML
12.5 INJECTION, SOLUTION INTRAMUSCULAR; INTRAVENOUS EVERY 6 HOURS PRN
Status: DISCONTINUED | OUTPATIENT
Start: 2017-04-11 | End: 2017-04-17

## 2017-04-11 RX ORDER — DEXTROSE MONOHYDRATE 25 G/50ML
25 INJECTION, SOLUTION INTRAVENOUS
Status: DISCONTINUED | OUTPATIENT
Start: 2017-04-11 | End: 2017-04-18 | Stop reason: HOSPADM

## 2017-04-11 RX ORDER — CLOPIDOGREL BISULFATE 75 MG/1
75 TABLET ORAL DAILY
Status: DISCONTINUED | OUTPATIENT
Start: 2017-04-11 | End: 2017-04-18 | Stop reason: HOSPADM

## 2017-04-11 RX ORDER — MORPHINE SULFATE 4 MG/ML
4 INJECTION, SOLUTION INTRAMUSCULAR; INTRAVENOUS ONCE
Status: DISCONTINUED | OUTPATIENT
Start: 2017-04-11 | End: 2017-04-18 | Stop reason: HOSPADM

## 2017-04-11 RX ORDER — PROMETHAZINE HYDROCHLORIDE 25 MG/ML
12.5 INJECTION, SOLUTION INTRAMUSCULAR; INTRAVENOUS ONCE
Status: COMPLETED | OUTPATIENT
Start: 2017-04-11 | End: 2017-04-11

## 2017-04-11 RX ORDER — SODIUM CHLORIDE 9 MG/ML
75 INJECTION, SOLUTION INTRAVENOUS CONTINUOUS
Status: DISCONTINUED | OUTPATIENT
Start: 2017-04-11 | End: 2017-04-15

## 2017-04-11 RX ORDER — GABAPENTIN 300 MG/1
300 CAPSULE ORAL EVERY 8 HOURS SCHEDULED
Status: DISCONTINUED | OUTPATIENT
Start: 2017-04-11 | End: 2017-04-18 | Stop reason: HOSPADM

## 2017-04-11 RX ORDER — ATORVASTATIN CALCIUM 40 MG/1
40 TABLET, FILM COATED ORAL NIGHTLY
Status: DISCONTINUED | OUTPATIENT
Start: 2017-04-11 | End: 2017-04-18 | Stop reason: HOSPADM

## 2017-04-11 RX ORDER — TIZANIDINE 4 MG/1
4 TABLET ORAL EVERY 6 HOURS PRN
Status: DISCONTINUED | OUTPATIENT
Start: 2017-04-11 | End: 2017-04-18 | Stop reason: HOSPADM

## 2017-04-11 RX ORDER — TOPIRAMATE 100 MG/1
100 TABLET, FILM COATED ORAL DAILY
Status: DISCONTINUED | OUTPATIENT
Start: 2017-04-11 | End: 2017-04-18 | Stop reason: HOSPADM

## 2017-04-11 RX ORDER — AMLODIPINE BESYLATE 2.5 MG/1
2.5 TABLET ORAL DAILY
Status: DISCONTINUED | OUTPATIENT
Start: 2017-04-11 | End: 2017-04-18 | Stop reason: HOSPADM

## 2017-04-11 RX ORDER — ASPIRIN 81 MG/1
81 TABLET, CHEWABLE ORAL DAILY
Status: DISCONTINUED | OUTPATIENT
Start: 2017-04-11 | End: 2017-04-18 | Stop reason: HOSPADM

## 2017-04-11 RX ORDER — ATENOLOL 25 MG/1
25 TABLET ORAL DAILY
Status: DISCONTINUED | OUTPATIENT
Start: 2017-04-11 | End: 2017-04-18 | Stop reason: HOSPADM

## 2017-04-11 RX ORDER — SODIUM CHLORIDE 0.9 % (FLUSH) 0.9 %
1-10 SYRINGE (ML) INJECTION AS NEEDED
Status: DISCONTINUED | OUTPATIENT
Start: 2017-04-11 | End: 2017-04-18 | Stop reason: HOSPADM

## 2017-04-11 RX ORDER — ONDANSETRON 2 MG/ML
4 INJECTION INTRAMUSCULAR; INTRAVENOUS EVERY 6 HOURS PRN
Status: DISCONTINUED | OUTPATIENT
Start: 2017-04-11 | End: 2017-04-18 | Stop reason: HOSPADM

## 2017-04-11 RX ORDER — NICOTINE POLACRILEX 4 MG
15 LOZENGE BUCCAL
Status: DISCONTINUED | OUTPATIENT
Start: 2017-04-11 | End: 2017-04-18 | Stop reason: HOSPADM

## 2017-04-11 RX ADMIN — ATENOLOL 25 MG: 25 TABLET ORAL at 21:21

## 2017-04-11 RX ADMIN — SODIUM CHLORIDE 1000 ML: 9 INJECTION, SOLUTION INTRAVENOUS at 16:17

## 2017-04-11 RX ADMIN — TIZANIDINE 4 MG: 4 TABLET ORAL at 21:18

## 2017-04-11 RX ADMIN — SODIUM CHLORIDE 1000 ML: 9 INJECTION, SOLUTION INTRAVENOUS at 11:00

## 2017-04-11 RX ADMIN — INSULIN DETEMIR 50 UNITS: 100 INJECTION, SOLUTION SUBCUTANEOUS at 21:18

## 2017-04-11 RX ADMIN — TOPIRAMATE 100 MG: 100 TABLET, FILM COATED ORAL at 21:30

## 2017-04-11 RX ADMIN — HYDROMORPHONE HYDROCHLORIDE 1 MG: 1 INJECTION, SOLUTION INTRAMUSCULAR; INTRAVENOUS; SUBCUTANEOUS at 15:13

## 2017-04-11 RX ADMIN — HYDROCODONE BITARTRATE AND ACETAMINOPHEN 1 TABLET: 7.5; 325 TABLET ORAL at 20:57

## 2017-04-11 RX ADMIN — INSULIN LISPRO 4 UNITS: 100 INJECTION, SOLUTION INTRAVENOUS; SUBCUTANEOUS at 21:20

## 2017-04-11 RX ADMIN — SODIUM CHLORIDE 75 ML/HR: 9 INJECTION, SOLUTION INTRAVENOUS at 21:31

## 2017-04-11 RX ADMIN — CLOPIDOGREL BISULFATE 75 MG: 75 TABLET, FILM COATED ORAL at 21:31

## 2017-04-11 RX ADMIN — HYDROMORPHONE HYDROCHLORIDE 0.5 MG: 1 INJECTION, SOLUTION INTRAMUSCULAR; INTRAVENOUS; SUBCUTANEOUS at 20:58

## 2017-04-11 RX ADMIN — AMLODIPINE BESYLATE 2.5 MG: 2.5 TABLET ORAL at 21:17

## 2017-04-11 RX ADMIN — ATORVASTATIN CALCIUM 40 MG: 40 TABLET, FILM COATED ORAL at 21:17

## 2017-04-11 RX ADMIN — ASPIRIN 81 MG: 81 TABLET, CHEWABLE ORAL at 21:17

## 2017-04-11 RX ADMIN — HEPARIN SODIUM 5000 UNITS: 5000 INJECTION, SOLUTION INTRAVENOUS; SUBCUTANEOUS at 21:19

## 2017-04-11 RX ADMIN — GABAPENTIN 300 MG: 300 CAPSULE ORAL at 21:17

## 2017-04-11 RX ADMIN — LISINOPRIL 40 MG: 20 TABLET ORAL at 21:16

## 2017-04-11 RX ADMIN — DIVALPROEX SODIUM 1500 MG: 500 TABLET, FILM COATED, EXTENDED RELEASE ORAL at 21:16

## 2017-04-11 RX ADMIN — DIPHENHYDRAMINE HYDROCHLORIDE 25 MG: 50 INJECTION INTRAMUSCULAR; INTRAVENOUS at 16:18

## 2017-04-11 RX ADMIN — MONTELUKAST SODIUM 10 MG: 10 TABLET, FILM COATED ORAL at 21:17

## 2017-04-11 RX ADMIN — PROMETHAZINE HYDROCHLORIDE 12.5 MG: 25 INJECTION, SOLUTION INTRAMUSCULAR; INTRAVENOUS at 16:23
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
## 2017-04-11 NOTE — ED PROVIDER NOTES
Subjective   HPI Comments: This patient is a very pleasant very nice 57-year-old gentleman who is acting appropriately according to his wife but who is had weakness over the last 2+ days.  His wife reports that he was acting normally on Friday but starting on Saturday, while they were at the movies, he began having some generalized weakness.  He tells me he started having a headache and is also been having head to toe pain.  Patient reports that he does not have any fevers chills cough or congestion.  No active chest pain.  There is been no focal or specific area of discomfort other than headache which is not uncommon for him with a history of migraines.  He's been taking care of himself well and has been taking medications as prescribed.  He's had the ability to travel with his wife up to see his daughter at the [facility] on Sunday and was also able to work outside to a small degree yesterday.  Despite this, the patient continues to be complaining of generalized weakness and not feeling well and his wife ultimately brought him up to the emergency department today for evaluation.  Patient has a history of pneumonia induced sepsis approximately 3+ years ago according the family.  Symptoms today do not sound similar to this in any way according to the family but was discussed.  Patient reports no difficulty speaking no difficulty thinking and no focal weakness.  He has been able to ambulate and his wife reports that his gait is not quite per usual.  In summary we have a vague and relatively nondescript complaint of weakness over the last 2-3 days with some headache and also head to toe pain with no trauma no falls and no other focal finding or complaint according to the family, and confirmed by his wife.      Past medical history:  Positive for CAD, DM, stroke    Family history:  Hypertension (mother), alcoholism (father)      Patient is a 57 y.o. male presenting with weakness.   History provided by:   Patient and spouse  Weakness - Generalized   Severity:  Moderate  Onset quality:  Gradual  Duration:  4 days  Timing:  Constant  Progression:  Unchanged  Chronicity:  New  Associated symptoms: headaches and myalgias    Associated symptoms: no abdominal pain, no chest pain, no diarrhea, no dysuria, no fever, no frequency, no loss of consciousness, no nausea, no seizures, no shortness of breath, no urgency and no vomiting    Risk factors: coronary artery disease and diabetes        Review of Systems   Constitutional: Positive for fatigue. Negative for fever and unexpected weight change.   HENT: Negative for dental problem, hearing loss and sinus pressure.    Eyes: Negative for visual disturbance.   Respiratory: Negative for chest tightness and shortness of breath.    Cardiovascular: Negative for chest pain, palpitations and leg swelling.   Gastrointestinal: Negative for abdominal pain, blood in stool, diarrhea, nausea and vomiting.   Genitourinary: Negative for difficulty urinating, dysuria, frequency, hematuria and urgency.   Musculoskeletal: Positive for gait problem (staggering gait) and myalgias. Negative for neck pain and neck stiffness.   Neurological: Positive for weakness (generalized) and headaches. Negative for seizures, loss of consciousness, syncope, speech difficulty and light-headedness.   Psychiatric/Behavioral: Negative for confusion.   All other systems reviewed and are negative.      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 EF of 60-65%. Grade 1 diastolic dysfunction of the LV myocardium. No evidence of  pericardial effusion   • History of herniated intervertebral disc     History of left L5-S1 disc herniation   • Hypercholesterolemia 11/2/2016   • Hypertension    • LOM (loss of memory) 1/19/2017   • Lower back pain     Right   • Measles    • Obstructive sleep apnea    • Palpitations    • Seizure disorder    • Shortness of breath 1/19/2017   • SOB (shortness of breath)    • Stroke        Allergies   Allergen Reactions   • Sulfa Antibiotics Anaphylaxis, Nausea And Vomiting and Delirium   • Codeine Nausea And Vomiting     Can only take with Phenergan   • Morphine      Does not work. It causes pain       Past Surgical History:   Procedure Laterality Date   • CARDIAC CATHETERIZATION     • CARPAL TUNNEL RELEASE Right    • CHOLECYSTECTOMY     • CORONARY ANGIOPLASTY WITH STENT PLACEMENT     • KNEE ARTHROSCOPY Bilateral    • NEUROPLASTY / TRANSPOSITION ULNAR NERVE AT ELBOW     • OTHER SURGICAL HISTORY      Foraminotomy and discectomy       Family History   Problem Relation Age of Onset   • Hypertension Mother    • Alcohol abuse Father    • Heart disease Other    • Stroke Other    • Hypertension Other    • Other Other      Neurologic disorder   • Parkinsonism Other    • Stroke Other    • Heart disease Other    • Hypertension Other    • Heart disease Other    • Stroke Other    • Hypertension Other        Social History     Social History   • Marital status:      Spouse name: N/A   • Number of children: N/A   • Years of education: N/A     Social History Main Topics   • Smoking status: Former Smoker     Quit date: 1/1/1998   • Smokeless tobacco: None   • Alcohol use Yes      Comment: 3 PER YEAR   • Drug use: No   • Sexual activity: Defer     Other Topics Concern   • None     Social History Narrative         Objective   Physical Exam   Constitutional: He is oriented to person, place, and time. He appears well-developed.  Non-toxic appearance. No distress.   Alert, oriented ×4, somewhat slow speech   HENT:   Head:  Normocephalic and atraumatic.   Right Ear: Tympanic membrane, external ear and ear canal normal.   Left Ear: Tympanic membrane, external ear and ear canal normal.   Nose: Nose normal. No nasal septal hematoma.   Mouth/Throat: Oropharynx is clear and moist. Mucous membranes are not dry. No oral lesions. No trismus in the jaw. No dental abscesses or uvula swelling. No posterior oropharyngeal erythema or tonsillar abscesses. No tonsillar exudate.   Eyes: EOM are normal. Pupils are equal, round, and reactive to light. Right conjunctiva is not injected. Left conjunctiva is not injected.   Neck: Normal range of motion. Neck supple. No JVD present. No tracheal tenderness present. No rigidity. Normal range of motion present.   Cardiovascular: Normal rate, regular rhythm and normal heart sounds.  Exam reveals no gallop and no friction rub.    Pulmonary/Chest: Effort normal and breath sounds normal. He has no wheezes. He has no rales. He exhibits no tenderness.   Abdominal: Soft. Bowel sounds are normal. He exhibits no distension, no pulsatile midline mass and no mass. There is no tenderness. There is no rigidity, no rebound, no guarding and no tenderness at McBurney's point.   No signs of acute abdomen.  No pain at McBurney's point.  No pulsatile abdominal mass.   Musculoskeletal: Normal range of motion. He exhibits no edema, tenderness or deformity.   Legs symmetrical in size.   Lymphadenopathy:     He has no cervical adenopathy.   Neurological: He is alert and oriented to person, place, and time. He has normal strength. He displays no tremor. No cranial nerve deficit. He exhibits normal muscle tone.   4/5 strength bilaterally with flexion and extension of fingers, wrist, elbows, knees and hips as well as plantar and dorsiflexion of the foot.  Slow speech, no dysarthria   Skin: Skin is warm and dry. No rash noted. No erythema.   No diaphoresis, lesions, nevi, petechia, purpura   Psychiatric: He has a normal mood and affect.  His speech is normal and behavior is normal. Judgment and thought content normal. He is attentive.   Nursing note and vitals reviewed.      Procedures         ED Course  ED Course   Comment By Time   I had a nice conversation with the patient and his wife.  I explained the workup in detail Nilton Smith MD 04/11 4909   I talked about the studies including MRI of the brain, chest x-ray, EKG and multiple laboratory studies.  In talking to the patient at great length, and getting additional history from the wife, he really does sound as if the patient's primary complaint is a generally vague and difficult to pin down weakness over the last couple of days with pain from head to toe.  He does not have any fevers or chills or any obvious source of infection.  This is unlike anything he has had in the past.  Despite feeling poorly, the patient is been to the [place], he has been to the [facility] to visit his daughter and he was able to work outside yesterday, indicating that his symptoms have not relegated him to his bed and that he has been able to take part in a relative [place] number of activities of daily living.  The patient and his wife confirmed this.  I ordered a flu study for the patient as well and also ordered IV fluid rehydration.  I don't anticipate we'll find anything obvious but certainly if we do the patient may require admission, otherwise I think outpatient follow-up is a reasonable next step, which the family agrees with. Nilton Smith MD 04/11 3733                     [facility]    Final diagnoses:   None     EMR Dragon/Transcription disclaimer:   Much of this encounter note is an electronic transcription/translation of spoken language to printed text. The electronic translation of spoken language may permit erroneous, or at times, nonsensical words or phrases to be inadvertently transcribed; Although I have reviewed the note for such errors, some may still exist.      Documentation  assistance provided by wen Moise.  Information recorded by the wen was done at my direction and has been verified and validated by me.     Cornelius Moise  04/11/17 1034       Nilton Smith MD  04/11/17 1045       Nilton Smith MD  04/11/17 1057       Nilton Smith MD  04/11/17 1056

## 2017-04-11 NOTE — PROGRESS NOTES
Discharge Planning Assessment  [facility]     Patient Name: Denzel Harding  MRN: [id number]  Today's Date: 4/11/2017    Admit Date: 4/11/2017          Discharge Needs Assessment       04/11/17 1742    Living Environment    Lives With child(yenni), adult;spouse    Living Arrangements house    Provides Primary Care For spouse;child(yenni)    Primary Care Provided By spouse/significant other    Quality Of Family Relationships supportive    Able to Return to Prior Living Arrangements yes    Discharge Needs Assessment    Concerns To Be Addressed denies needs/concerns at this time    Readmission Within The Last 30 Days no previous admission in last 30 days    Equipment Currently Used at Home cane, straight;other (see comments)   BIPAP-serviced by [facility]; Nebulizer    Equipment Needed After Discharge none    Transportation Available family or friend will provide    Discharge Disposition still a patient    Discharge Contact Information if Applicable [phone]            Discharge Plan       04/11/17 1744    Case Management/Social Work Plan    Plan Home    Patient/Family In Agreement With Plan yes    Additional Comments Spoke with pt. at . Pt lives with spouse and daughter in [facility]. Pt. is independent with ADL's and mobility, however he has a cane for occasional use. Pt has a cane, nebulizer, BIPAP, and no other DME or HH. His PCP is Ottoniel Toledo and medications are covered by insurance. Plan for discharge is to return home. Pt to transport home with family, when medically ready. Case Management will follow and assist with any discharge planning needs.        Discharge Placement     No information found                Demographic Summary       04/11/17 1736    Referral Information    Arrived From home or self-care    Reason For Consult discharge planning    Contact Information    Permission Granted to Share Information With family/designee    Comments Carol Harding (spouse) [phone]    Primary Care  Physician Information    Name Ottoniel Toledo            Functional Status       04/11/17 1741    Functional Status Prior    Ambulation 0-->independent    Transferring 0-->independent    Toileting 0-->independent    Bathing 0-->independent    Dressing 0-->independent    Eating 0-->independent    Communication 0-->understands/communicates without difficulty    Swallowing 0-->swallows foods/liquids without difficulty    IADL    Medications independent    Meal Preparation independent    Housekeeping independent    Laundry independent    Shopping independent    Oral Care independent    Activity Tolerance    Current Activity Limitations driving restrictions    Usual Activity Tolerance fair    Current Activity Tolerance poor    Employment/Financial    Employment/Finance Comments Healthcare and medication coverage: [employer]            Psychosocial     None            Abuse/Neglect     None            Legal     None            Substance Abuse     None            Patient Forms     None          Yas Song RN

## 2017-04-11 NOTE — PLAN OF CARE
Problem: Patient Care Overview (Adult)  Goal: Plan of Care Review  Outcome: Ongoing (interventions implemented as appropriate)    04/11/17 1807   Coping/Psychosocial Response Interventions   Plan Of Care Reviewed With patient;spouse   Patient Care Overview   Progress no change         Problem: Pain, Acute (Adult)  Goal: Identify Related Risk Factors and Signs and Symptoms  Outcome: Ongoing (interventions implemented as appropriate)    04/11/17 1807   Pain, Acute   Related Risk Factors (Acute Pain) persistent pain

## 2017-04-12 LAB
ANION GAP SERPL CALCULATED.3IONS-SCNC: 4 MMOL/L (ref 3–11)
BASOPHILS # BLD AUTO: 0.03 10*3/MM3 (ref 0–0.2)
BASOPHILS NFR BLD AUTO: 0.6 % (ref 0–1)
BUN BLD-MCNC: 15 MG/DL (ref 9–23)
BUN/CREAT SERPL: 21.4 (ref 7–25)
CALCIUM SPEC-SCNC: 9.2 MG/DL (ref 8.7–10.4)
CHLORIDE SERPL-SCNC: 109 MMOL/L (ref 99–109)
CO2 SERPL-SCNC: 25 MMOL/L (ref 20–31)
CREAT BLD-MCNC: 0.7 MG/DL (ref 0.6–1.3)
DEPRECATED RDW RBC AUTO: 45.2 FL (ref 37–54)
EOSINOPHIL # BLD AUTO: 0.22 10*3/MM3 (ref 0.1–0.3)
EOSINOPHIL NFR BLD AUTO: 4.4 % (ref 0–3)
ERYTHROCYTE [DISTWIDTH] IN BLOOD BY AUTOMATED COUNT: 13.9 % (ref 11.3–14.5)
ERYTHROCYTE [SEDIMENTATION RATE] IN BLOOD: 2 MM/HR (ref 0–20)
GFR SERPL CREATININE-BSD FRML MDRD: 117 ML/MIN/1.73
GLUCOSE BLD-MCNC: 132 MG/DL (ref 70–100)
GLUCOSE BLDC GLUCOMTR-MCNC: 116 MG/DL (ref 70–130)
GLUCOSE BLDC GLUCOMTR-MCNC: 134 MG/DL (ref 70–130)
GLUCOSE BLDC GLUCOMTR-MCNC: 158 MG/DL (ref 70–130)
GLUCOSE BLDC GLUCOMTR-MCNC: 159 MG/DL (ref 70–130)
HBA1C MFR BLD: 9.2 % (ref 4.8–5.6)
HCT VFR BLD AUTO: 40.3 % (ref 38.9–50.9)
HGB BLD-MCNC: 13 G/DL (ref 13.1–17.5)
IMM GRANULOCYTES # BLD: 0.03 10*3/MM3 (ref 0–0.03)
IMM GRANULOCYTES NFR BLD: 0.6 % (ref 0–0.6)
LYMPHOCYTES # BLD AUTO: 1.89 10*3/MM3 (ref 0.6–4.8)
LYMPHOCYTES NFR BLD AUTO: 38 % (ref 24–44)
MCH RBC QN AUTO: 28.5 PG (ref 27–31)
MCHC RBC AUTO-ENTMCNC: 32.3 G/DL (ref 32–36)
MCV RBC AUTO: 88.4 FL (ref 80–99)
MONOCYTES # BLD AUTO: 0.48 10*3/MM3 (ref 0–1)
MONOCYTES NFR BLD AUTO: 9.7 % (ref 0–12)
NEUTROPHILS # BLD AUTO: 2.32 10*3/MM3 (ref 1.5–8.3)
NEUTROPHILS NFR BLD AUTO: 46.7 % (ref 41–71)
PLATELET # BLD AUTO: 113 10*3/MM3 (ref 150–450)
PMV BLD AUTO: 10.3 FL (ref 6–12)
POTASSIUM BLD-SCNC: 3.6 MMOL/L (ref 3.5–5.5)
RBC # BLD AUTO: 4.56 10*6/MM3 (ref 4.2–5.76)
SODIUM BLD-SCNC: 138 MMOL/L (ref 132–146)
TROPONIN I SERPL-MCNC: <0.006 NG/ML
TSH SERPL DL<=0.05 MIU/L-ACNC: 4.08 MIU/ML (ref 0.35–5.35)
VALPROATE SERPL-MCNC: 50 MCG/ML (ref 50–150)
WBC NRBC COR # BLD: 4.97 10*3/MM3 (ref 3.5–10.8)

## 2017-04-12 PROCEDURE — 25010000002 KETOROLAC TROMETHAMINE PER 15 MG: Performed by: FAMILY MEDICINE

## 2017-04-12 PROCEDURE — G8987 SELF CARE CURRENT STATUS: HCPCS

## 2017-04-12 PROCEDURE — 80048 BASIC METABOLIC PNL TOTAL CA: CPT | Performed by: HOSPITALIST

## 2017-04-12 PROCEDURE — 85025 COMPLETE CBC W/AUTO DIFF WBC: CPT | Performed by: HOSPITALIST

## 2017-04-12 PROCEDURE — 96375 TX/PRO/DX INJ NEW DRUG ADDON: CPT

## 2017-04-12 PROCEDURE — G0378 HOSPITAL OBSERVATION PER HR: HCPCS

## 2017-04-12 PROCEDURE — 25010000002 PROMETHAZINE PER 50 MG: Performed by: HOSPITALIST

## 2017-04-12 PROCEDURE — 93010 ELECTROCARDIOGRAM REPORT: CPT | Performed by: INTERNAL MEDICINE

## 2017-04-12 PROCEDURE — 82962 GLUCOSE BLOOD TEST: CPT

## 2017-04-12 PROCEDURE — 97165 OT EVAL LOW COMPLEX 30 MIN: CPT

## 2017-04-12 PROCEDURE — 99215 OFFICE O/P EST HI 40 MIN: CPT | Performed by: PSYCHIATRY & NEUROLOGY

## 2017-04-12 PROCEDURE — 25010000002 HYDROMORPHONE PER 4 MG: Performed by: HOSPITALIST

## 2017-04-12 PROCEDURE — 63710000001 INSULIN DETEMIR PER 5 UNITS: Performed by: HOSPITALIST

## 2017-04-12 PROCEDURE — 85652 RBC SED RATE AUTOMATED: CPT | Performed by: HOSPITALIST

## 2017-04-12 PROCEDURE — 80164 ASSAY DIPROPYLACETIC ACD TOT: CPT | Performed by: HOSPITALIST

## 2017-04-12 PROCEDURE — 25010000002 HEPARIN (PORCINE) PER 1000 UNITS: Performed by: HOSPITALIST

## 2017-04-12 PROCEDURE — 84443 ASSAY THYROID STIM HORMONE: CPT | Performed by: HOSPITALIST

## 2017-04-12 PROCEDURE — 99225 PR SBSQ OBSERVATION CARE/DAY 25 MINUTES: CPT | Performed by: FAMILY MEDICINE

## 2017-04-12 PROCEDURE — 96372 THER/PROPH/DIAG INJ SC/IM: CPT

## 2017-04-12 PROCEDURE — 84484 ASSAY OF TROPONIN QUANT: CPT | Performed by: FAMILY MEDICINE

## 2017-04-12 PROCEDURE — G8988 SELF CARE GOAL STATUS: HCPCS

## 2017-04-12 PROCEDURE — 83036 HEMOGLOBIN GLYCOSYLATED A1C: CPT | Performed by: HOSPITALIST

## 2017-04-12 PROCEDURE — 96376 TX/PRO/DX INJ SAME DRUG ADON: CPT

## 2017-04-12 PROCEDURE — 93005 ELECTROCARDIOGRAM TRACING: CPT | Performed by: FAMILY MEDICINE

## 2017-04-12 RX ORDER — NITROGLYCERIN 0.4 MG/1
0.4 TABLET SUBLINGUAL
Status: DISCONTINUED | OUTPATIENT
Start: 2017-04-12 | End: 2017-04-18 | Stop reason: HOSPADM

## 2017-04-12 RX ORDER — ACETAMINOPHEN, ASPIRIN AND CAFFEINE 250; 250; 65 MG/1; MG/1; MG/1
2 TABLET, FILM COATED ORAL EVERY 6 HOURS PRN
Status: DISCONTINUED | OUTPATIENT
Start: 2017-04-12 | End: 2017-04-18 | Stop reason: HOSPADM

## 2017-04-12 RX ORDER — KETOROLAC TROMETHAMINE 30 MG/ML
30 INJECTION, SOLUTION INTRAMUSCULAR; INTRAVENOUS EVERY 6 HOURS PRN
Status: DISPENSED | OUTPATIENT
Start: 2017-04-12 | End: 2017-04-17

## 2017-04-12 RX ORDER — NITROGLYCERIN 0.4 MG/1
TABLET SUBLINGUAL
Status: COMPLETED
Start: 2017-04-12 | End: 2017-04-12

## 2017-04-12 RX ADMIN — KETOROLAC TROMETHAMINE 30 MG: 30 INJECTION, SOLUTION INTRAMUSCULAR at 22:19

## 2017-04-12 RX ADMIN — HYDROMORPHONE HYDROCHLORIDE 0.5 MG: 1 INJECTION, SOLUTION INTRAMUSCULAR; INTRAVENOUS; SUBCUTANEOUS at 04:54

## 2017-04-12 RX ADMIN — GABAPENTIN 300 MG: 300 CAPSULE ORAL at 14:38

## 2017-04-12 RX ADMIN — ASPIRIN 81 MG: 81 TABLET, CHEWABLE ORAL at 09:29

## 2017-04-12 RX ADMIN — HEPARIN SODIUM 5000 UNITS: 5000 INJECTION, SOLUTION INTRAVENOUS; SUBCUTANEOUS at 14:38

## 2017-04-12 RX ADMIN — HYDROMORPHONE HYDROCHLORIDE 0.5 MG: 1 INJECTION, SOLUTION INTRAMUSCULAR; INTRAVENOUS; SUBCUTANEOUS at 17:43

## 2017-04-12 RX ADMIN — INSULIN DETEMIR 50 UNITS: 100 INJECTION, SOLUTION SUBCUTANEOUS at 22:04

## 2017-04-12 RX ADMIN — HEPARIN SODIUM 5000 UNITS: 5000 INJECTION, SOLUTION INTRAVENOUS; SUBCUTANEOUS at 05:45

## 2017-04-12 RX ADMIN — NITROGLYCERIN: 0.4 TABLET SUBLINGUAL at 17:24

## 2017-04-12 RX ADMIN — PROMETHAZINE HYDROCHLORIDE 12.5 MG: 25 INJECTION, SOLUTION INTRAMUSCULAR; INTRAVENOUS at 17:38

## 2017-04-12 RX ADMIN — PROMETHAZINE HYDROCHLORIDE 12.5 MG: 25 INJECTION, SOLUTION INTRAMUSCULAR; INTRAVENOUS at 09:43

## 2017-04-12 RX ADMIN — GABAPENTIN 300 MG: 300 CAPSULE ORAL at 05:46

## 2017-04-12 RX ADMIN — HEPARIN SODIUM 5000 UNITS: 5000 INJECTION, SOLUTION INTRAVENOUS; SUBCUTANEOUS at 22:05

## 2017-04-12 RX ADMIN — AMLODIPINE BESYLATE 2.5 MG: 2.5 TABLET ORAL at 09:29

## 2017-04-12 RX ADMIN — KETOROLAC TROMETHAMINE 30 MG: 30 INJECTION, SOLUTION INTRAMUSCULAR at 12:49

## 2017-04-12 RX ADMIN — VALPROATE SODIUM 950 MG: 100 INJECTION, SOLUTION INTRAVENOUS at 19:05

## 2017-04-12 RX ADMIN — GABAPENTIN 300 MG: 300 CAPSULE ORAL at 22:06

## 2017-04-12 RX ADMIN — ATORVASTATIN CALCIUM 40 MG: 40 TABLET, FILM COATED ORAL at 22:06

## 2017-04-12 RX ADMIN — INSULIN LISPRO 2 UNITS: 100 INJECTION, SOLUTION INTRAVENOUS; SUBCUTANEOUS at 17:45

## 2017-04-12 RX ADMIN — SODIUM CHLORIDE 75 ML/HR: 9 INJECTION, SOLUTION INTRAVENOUS at 09:46

## 2017-04-12 RX ADMIN — HYDROMORPHONE HYDROCHLORIDE 0.5 MG: 1 INJECTION, SOLUTION INTRAMUSCULAR; INTRAVENOUS; SUBCUTANEOUS at 01:19

## 2017-04-12 RX ADMIN — ATENOLOL 25 MG: 25 TABLET ORAL at 09:29

## 2017-04-12 RX ADMIN — HYDROMORPHONE HYDROCHLORIDE 0.5 MG: 1 INJECTION, SOLUTION INTRAMUSCULAR; INTRAVENOUS; SUBCUTANEOUS at 15:35

## 2017-04-12 RX ADMIN — HYDROCODONE BITARTRATE AND ACETAMINOPHEN 1 TABLET: 7.5; 325 TABLET ORAL at 09:37

## 2017-04-12 RX ADMIN — CLOPIDOGREL BISULFATE 75 MG: 75 TABLET, FILM COATED ORAL at 09:29

## 2017-04-12 RX ADMIN — ACETAMINOPHEN, ASPIRIN AND CAFFEINE 2 TABLET: 250; 250; 65 TABLET, FILM COATED ORAL at 18:01

## 2017-04-12 RX ADMIN — TOPIRAMATE 100 MG: 100 TABLET, FILM COATED ORAL at 09:37

## 2017-04-12 RX ADMIN — NITROGLYCERIN 0.4 MG: 0.4 TABLET SUBLINGUAL at 17:35

## 2017-04-12 RX ADMIN — MONTELUKAST SODIUM 10 MG: 10 TABLET, FILM COATED ORAL at 22:06

## 2017-04-12 RX ADMIN — LISINOPRIL 40 MG: 20 TABLET ORAL at 09:29

## 2017-04-12 RX ADMIN — INSULIN LISPRO 2 UNITS: 100 INJECTION, SOLUTION INTRAVENOUS; SUBCUTANEOUS at 22:05

## 2017-04-12 NOTE — PROGRESS NOTES
Acute Care - Occupational Therapy Initial Evaluation  [facility]     Patient Name: Denzel Harding  : 1960  MRN: [id number]  Today's Date: 2017  Onset of Illness/Injury or Date of Surgery Date: 17  Date of Referral to OT: 17  Referring Physician: MD Michele    Admit Date: 2017       ICD-10-CM ICD-9-CM   1. Weakness generalized R53.1 780.79   2. Intractable episodic headache, unspecified headache type R51 784.0   3. Impaired mobility and ADLs Z74.09 799.89     Patient Active Problem List   Diagnosis   • Coronary artery disease involving native coronary artery of native heart without angina pectoris   • Pre-operative cardiovascular examination   • Lumbar radiculopathy   • Hypercholesterolemia   • Diabetic polyneuropathy associated with type 2 diabetes mellitus   • Periodic headache syndrome, not intractable   • Palpitations   • Chest pain   • Shortness of breath   • Edema   • Hypertension   • GERD (gastroesophageal reflux disease)   • Brachial neuritis   • Cervical radiculopathy   • LOM (loss of memory)   • Weakness generalized   • Anxiety   • Diabetes mellitus   • Lower back pain     Past Medical History:   Diagnosis Date   • Anxiety    • Arthritis    • Asthma    • Brachial neuritis 2017   • CAD in native artery    • Cardiac disorder    • Chest pain    • COPD (chronic obstructive pulmonary disease)    • Depression    • Diabetes mellitus    • Dizziness    • Edema    • GERD (gastroesophageal reflux disease)    • H/O chest x-ray 2015    Mild Left base atelectasis   • H/O echocardiogram 2015    Normal LVSF. EF of 60-65%. Grade 1 diastolic dysfunction of the LV myocardium. No evidence of pericardial effusion   • History of herniated intervertebral disc     History of left L5-S1 disc herniation   • Hypercholesterolemia 2016   • Hypertension    • LOM (loss of memory) 2017   • Lower back pain     Right   • Measles    • Obstructive sleep apnea    • Palpitations    •  Seizure disorder    • Shortness of breath 1/19/2017   • SOB (shortness of breath)    • Stroke      Past Surgical History:   Procedure Laterality Date   • CARDIAC CATHETERIZATION     • CARPAL TUNNEL RELEASE Right    • CHOLECYSTECTOMY     • CORONARY ANGIOPLASTY WITH STENT PLACEMENT     • KNEE ARTHROSCOPY Bilateral    • NEUROPLASTY / TRANSPOSITION ULNAR NERVE AT ELBOW     • OTHER SURGICAL HISTORY      Foraminotomy and discectomy          OT ASSESSMENT FLOWSHEET (last 72 hours)      OT Evaluation       04/12/17 1456 04/11/17 1812 04/11/17 1742 04/11/17 1741       Rehab Evaluation    Document Type evaluation  -        Subjective Information agree to therapy;complains of;weakness  -        Patient Effort, Rehab Treatment good  -        Symptoms Noted During/After Treatment fatigue;dizziness  -        Symptoms Noted Comment VSS throughout  -        General Information    Patient Profile Review yes  -        Onset of Illness/Injury or Date of Surgery Date 04/11/17  -        Referring Physician MD Michele  -        General Observations Pt received in supine, dtr present  -        Pertinent History Of Current Problem Pt is 57 YOM who presented to ED with 2 week h/x of generalized weakness.  It got progressively worse over past 2 days along with a headache and pain from head to toe  -        Precautions/Limitations fall precautions;seizure precautions  -        Prior Level of Function independent:;all household mobility;community mobility;gait;transfer;bed mobility;ADL's   Walked with [facility]  -        Equipment Currently Used at Home cane, straight;other (see comments);walker, rolling   Bipap  -  cane, straight;other (see comments)   BIPAP-serviced by [facility]; Nebulizer  -      Plans/Goals Discussed With patient and family;agreed upon  -        Risks Reviewed patient and family:;LOB;nausea/vomiting;dizziness;increased discomfort;change in vital signs  -        Benefits Reviewed patient and  family:;improve function;increase independence;increase strength;increase balance;increase knowledge  [facility]        Barriers to Rehab medically complex;previous functional deficit  -        Living Environment    Lives With spouse  - spouse  - child(yenni), adult;spouse  -      Living Arrangements [place]  - house  - house  -      Home Accessibility ramps present at home   Walk in and tub shower  - no concerns  -       Stair Railings at Home  none  -       Type of Financial/Environmental Concern  none  -DG       Transportation Available  family or friend will provide  -DG family or friend will provide  -      Living Environment Comment Pt's spouse works during the day.  His dtr is a student at [employer] independent  -DG       Clinical Impression    Date of Referral to OT 04/12/17  [facility]        OT Diagnosis Impaired ADLs and functional mobility  -        Patient/Family Goals Statement to return to [facility]  -        Criteria for Skilled Therapeutic Interventions Met yes;treatment indicated  -        Rehab Potential good, to achieve stated therapy goals  [facility]        Therapy Frequency daily  -        Anticipated Discharge Disposition home with assist;home with outpatient services  [facility]        Functional Level Prior    Ambulation  0-->independent  -DG  0-->independent  -ST     Transferring  0-->independent  -DG  0-->independent  -ST     Toileting  0-->independent  -DG  0-->independent  -ST     Bathing  0-->independent  -DG  0-->independent  -ST     Dressing  0-->independent  -DG  0-->independent  -ST     Eating  0-->independent  -DG  0-->independent  -ST     Communication  0-->understands/communicates without difficulty  -DG  0-->understands/communicates without difficulty  -ST     Swallowing  0-->swallows foods/liquids without difficulty  -DG  0-->swallows foods/liquids without difficulty  -ST     Vital Signs    Pre Systolic BP Rehab 119  -MC        Pre Treatment Diastolic BP 75  -MC        Intra Systolic  "BP Rehab 124  -        Intra Treatment Diastolic BP 79  -        Post Systolic BP Rehab 124  -        Post Treatment Diastolic BP 73  -        Pretreatment Heart Rate (beats/min) 64  -        Posttreatment Heart Rate (beats/min) 63  -        Pre SpO2 (%) 97  -        O2 Delivery Pre Treatment room air  -        Post SpO2 (%) 96  -        O2 Delivery Post Treatment room air  -        Pre Patient Position Supine  -        Intra Patient Position Sitting  -        Post Patient Position Sitting  -        Pain Assessment    Pain Assessment 0-10  -        Pain Score 8  -        Post Pain Score 8  -        Pain Type Acute pain  -        Pain Location Head  -        Pain Intervention(s) Repositioned  -        Vision Assessment/Intervention    Visual Impairment WFL with corrective lenses;blurred;other (see comments)  -        Vision Comment Pt reporting to see \"kaleidescope visual patterns,\" able to read small print and ID colors correcty.  Blurred vision 2/2 cataracts  -        Cognitive Assessment/Intervention    Current Cognitive/Communication Assessment functional  -        Orientation Status oriented x 4  -        Follows Commands/Answers Questions 100% of the time  -        Personal Safety WNL/WFL  -        Personal Safety Interventions fall prevention program maintained;gait belt;muscle strengthening facilitated;nonskid shoes/slippers when out of bed;supervised activity  -        Short/Long Term Memory mild impairment, short term memory;intact long term memory   residual deficit from CVA  -        ROM (Range of Motion)    General ROM no range of motion deficits identified  -        General ROM Detail for BUE . Defer BLE to PT  -        MMT (Manual Muscle Testing)    General MMT Assessment upper extremity strength deficits identified  -        General MMT Assessment Detail BUE 4/5. Defer BLE to PT  -        Bed Mobility, Assessment/Treatment    Bed Mobility, " Assistive Device bed rails;head of bed elevated  -        Bed Mobility, Roll Right, [place] supervision required  -        Bed Mobility, Scoot/Bridge, [place] supervision required  -        Bed Mob, Supine to Sit, [place] supervision required  -        Transfer Assessment/Treatment    Transfers, Bed-Chair [place] contact guard assist;2 person assist required;upper extremity support  -        Transfers, Sit-Stand [place] contact guard assist;2 person assist required  -        Transfers, Stand-Sit [place] contact guard assist;2 person assist required  -        Toilet Transfer, [place] contact guard assist  -        Transfer, Safety Issues balance decreased during turns;step length decreased;loses balance backward  -        Transfer, Impairments strength decreased;impaired balance;coordination impaired  -        Transfer, Comment Cues for hand placement, safe transfer technique  -        Functional Mobility    Functional Mobility- Ind. Level minimum assist (75% patient effort)  -        Functional Mobility- Device --   RUE support  -        Functional Mobility-Distance (Feet) --   in hallway  -        Functional Mobility- Comment Pt with ataxic gait, unsteady on his feet and required 1 standing rest break. Tech followed with recliner for safety  -        Lower Body Dressing Assessment/Training    LB Dressing Assess/Train, Clothing Type donning:;slipper socks  -        LB Dressing Assess/Train, Position edge of bed;sitting  -        LB Dressing Assess/Train, [place] supervision required  -        Toileting Assessment/Training    Toileting Assess/Train, Assistive Device grab bars  -        Toileting Assess/Train, Position sitting  -        Toileting Assess/Train, Indepen Level supervision required  -        Grooming Assessment/Training    Grooming Assess/Train, Position supported sitting  -        Grooming Assess/Train, Indepen Level set  up required  -        Grooming Assess/Train, Comment wash hands  -        Motor Skills/Interventions    Motor Response Observations ataxia  -        Additional Documentation Balance Skills Training (Group);Fine Motor Coordination Training (Group);Gross Motor Coordination Training (Group)  -        Balance Skills Training    Sitting-Level of Assistance Distant supervision  -        Sitting-Balance Support No upper extremity supported  -        Standing-Level of Assistance Contact guard;x2  -        Static Standing Balance Support Right upper extremity supported  -        Gait Balance-Level of Assistance Minimum assistance  -        Gait Balance Support Right upper extremity supported  -        Gross Motor Coordination Training    Gross Motor Skill, Impairments Detail BUE WFL  -        Fine Motor Coordination Training    Opposition Right:;Left:;intact  -        Sensory Assessment/Intervention    Sensory Impairment numbness;tingling  -        Light Touch LUE;RUE;LLE;RLE;Head/Neck  -        LUE Light Touch mild impairment  -        RUE Light Touch mild impairment  -        LLE Light Touch mild impairment  -        RLE Light Touch mild impairment  -        Head/Neck Light Touch mild impairment  -        General Therapy Interventions    Planned Therapy Interventions ADL retraining;balance training;bed mobility training;home exercise program;strengthening;transfer training  -        Positioning and Restraints    Pre-Treatment Position in bed  -        Post Treatment Position chair  -        In Chair notified nsg;reclined;call light within reach;encouraged to call for assist;with family/caregiver  -          User Key  (r) = Recorded By, (t) = Taken By, (c) = Cosigned By    Initials Name Effective Dates    ST Yas Song, RN 03/03/16 -     REBEKA Tubbs OT 03/14/16 -     DG Donya Myrick RN 10/17/16 -            Occupational Therapy Education     Title: PT OT SLP  Therapies (Active)     Topic: Occupational Therapy (Active)     Point: ADL training (Done)    Description: Instruct learner(s) on proper safety adaptation and remediation techniques during self care or transfers.   Instruct in proper use of assistive devices.    Learning Progress Summary    Learner Readiness Method Response Comment Documented by Status   Patient Acceptance E VU,NR   04/12/17 1534 Done   Family Acceptance E VU,NR   04/12/17 1534 Done               Point: Body mechanics (Done)    Description: Instruct learner(s) on proper positioning and spine alignment during self-care, functional mobility activities and/or exercises.    Learning Progress Summary    Learner Readiness Method Response Comment Documented by Status   Patient Acceptance E VU,NR   04/12/17 1534 Done   Family Acceptance E VU,NR   04/12/17 1534 Done                      User Key     Initials Effective Dates Name Provider Type Discipline     03/14/16 -  Danielle Tubbs, OT Occupational Therapist OT                  OT Recommendation and Plan  Anticipated Discharge Disposition: home with assist, home with outpatient services  Planned Therapy Interventions: ADL retraining, balance training, bed mobility training, home exercise program, strengthening, transfer training  Therapy Frequency: daily  Plan of Care Review  Plan Of Care Reviewed With: patient, daughter  Outcome Summary/Follow up Plan: OT initial eval completed. Pt demonstrates an increased need for assist 2/2 weakness.  Recommend pt to receive skilled OT to address ADLs, functional mobility, safety and education to return to pLOF.  Recommend pt to go home with outpt therapy services at time of DC.          OT Goals       04/12/17 1534          Transfer Training OT LTG    Transfer Training OT LTG, Date Established 04/12/17  -      Transfer Training OT LTG, Time to Achieve by discharge  -      Transfer Training OT LTG, Activity Type sit to stand/stand to sit;toilet  -       Transfer Training OT LTG, [place] Level supervision required  -      Transfer Training OT LTG, Assist Device walker, rolling  -      Transfer Training OT LTG, Outcome goal ongoing  -      Strength OT LTG    Strength Goal OT LTG, Date Established 04/12/17  -      Strength Goal OT LTG, Time to Achieve by discharge  -      Strength Goal OT LTG, Measure to Achieve Pt will increase BUE to 5/5 to increase independence with ADLs.  -      Strength Goal OT LTG, Outcome goal ongoing  -      Toileting OT LTG    Toileting Goal OT LTG, Date Established 04/12/17  -      Toileting Goal OT LTG, Time to Achieve by discharge  -      Toileting Goal OT LTG, [place] Level set up required  -      Toileting Goal OT LTG, Outcome goal ongoing  -      Functional Mobility OT LTG    Functional Mobility Goal OT LTG, Date Established 04/12/17  -      Functional Mobility Goal OT LTG, Time to Achieve by discharge  -      Functional Mobility Goal OT LTG, [place] Level contact guard  -      Functional Mobility Goal OT LTG, Assist Device rolling walker  -      Functional Mobility Goal OT LTG, Distance to Achieve to the bathroom  -      Functional Mobility Goal OT LTG, Outcome goal ongoing  -        User Key  (r) = Recorded By, (t) = Taken By, (c) = Cosigned By    Initials Name Provider Type    REBEKA Tubbs OT Occupational Therapist                Outcome Measures       04/12/17 0525          How much help from another is currently needed...    Putting on and taking off regular lower body clothing? 3  -MC      Bathing (including washing, rinsing, and drying) 3  -MC      Toileting (which includes using toilet bed pan or urinal) 3  -MC      Putting on and taking off regular upper body clothing 3  -MC      Taking care of personal grooming (such as brushing teeth) 3  -MC      Eating meals 4  -MC      Score 19  -MC      Modified Scottie Scale    Modified Scottie Scale 4 - Moderately severe disability.   Unable to walk without assistance, and unable to attend to own bodily needs without assistance.  -      Functional Assessment    Outcome Measure Options AM-PAC 6 Clicks Daily Activity (OT);Modified [facility]        User Key  (r) = Recorded By, (t) = Taken By, (c) = Cosigned By    Initials Name Provider Type     Danielle Tubbs OT Occupational Therapist          Time Calculation:   OT Start Time: 1456    Therapy Charges for Today     Code Description Service Date Service Provider Modifiers Qty    35627686350  OT SELFCARE CURRENT 4/12/2017 Danielle Tubbs OT GO, CK 1    [id number]  OT SELFCARE PROJECTED 4/12/2017 Danielle Tubbs OT GO, CJ 1    [id number]  OT EVAL LOW COMPLEXITY 4 4/12/2017 Danielle Tubbs OT GO 1    [id number]  OT THER SUPP EA 15 MIN 4/12/2017 Danielle Tubbs OT GO 4          OT G-codes  OT Professional Judgement Used?: Yes  OT Functional Scales Options: AM-PAC 6 Clicks Daily Activity (OT)  Score: 19  Functional Limitation: Self care  Self Care Current Status (): At least 40 percent but less than 60 percent impaired, limited or restricted  Self Care Goal Status (): At least 20 percent but less than 40 percent impaired, limited or restricted    Danielle Tubbs OT  4/12/2017

## 2017-04-12 NOTE — PLAN OF CARE
Problem: Patient Care Overview (Adult)  Goal: Plan of Care Review  Outcome: Ongoing (interventions implemented as appropriate)    04/12/17 1534   Coping/Psychosocial Response Interventions   Plan Of Care Reviewed With patient;daughter   Outcome Evaluation   Outcome Summary/Follow up Plan OT initial eval completed. Pt demonstrates an increased need for assist 2/2 weakness. Recommend pt to receive skilled OT to address ADLs, functional mobility, safety and education to return to pLOF. Recommend pt to go home with outpt therapy services at time of DC.         Problem: Inpatient Occupational Therapy  Goal: Transfer Training Goal 1 LTG- OT  Outcome: Ongoing (interventions implemented as appropriate)    04/12/17 1534   Transfer Training OT LTG   Transfer Training OT LTG, Date Established 04/12/17   Transfer Training OT LTG, Time to Achieve by discharge   Transfer Training OT LTG, Activity Type sit to stand/stand to sit;toilet   Transfer Training OT LTG, [place] Level supervision required   Transfer Training OT LTG, Assist Device walker, rolling   Transfer Training OT LTG, Outcome goal ongoing       Goal: Strength Goal LTG- OT  Outcome: Ongoing (interventions implemented as appropriate)    04/12/17 1534   Strength OT LTG   Strength Goal OT LTG, Date Established 04/12/17   Strength Goal OT LTG, Time to Achieve by discharge   Strength Goal OT LTG, Measure to Achieve Pt will increase BUE to 5/5 to increase independence with ADLs.   Strength Goal OT LTG, Outcome goal ongoing       Goal: Toileting Goal LTG- OT  Outcome: Ongoing (interventions implemented as appropriate)    04/12/17 1534   Toileting OT LTG   Toileting Goal OT LTG, Date Established 04/12/17   Toileting Goal OT LTG, Time to Achieve by discharge   Toileting Goal OT LTG, [place] Level set up required   Toileting Goal OT LTG, Outcome goal ongoing       Goal: Functional Mobility Goal LTG- OT  Outcome: Ongoing (interventions implemented as appropriate)     04/12/17 1534   Functional Mobility OT LTG   Functional Mobility Goal OT LTG, Date Established 04/12/17   Functional Mobility Goal OT LTG, Time to Achieve by discharge   Functional Mobility Goal OT LTG, [place] Level contact guard   Functional Mobility Goal OT LTG, Assist Device rolling walker   Functional Mobility Goal OT LTG, Distance to Achieve to the bathroom   Functional Mobility Goal OT LTG, Outcome goal ongoing

## 2017-04-12 NOTE — PLAN OF CARE
Problem: Pain, Acute (Adult)  Goal: Identify Related Risk Factors and Signs and Symptoms  Outcome: Ongoing (interventions implemented as appropriate)    04/12/17 0320   Pain, Acute   Related Risk Factors (Acute Pain) persistent pain;positioning;disease process   Signs and Symptoms (Acute Pain) facial mask of pain/grimace;sleep pattern alteration;questions meaning of pain;fatigue/weakness       Goal: Acceptable Pain Control/Comfort Level  Outcome: Ongoing (interventions implemented as appropriate)    04/12/17 0320   Pain, Acute (Adult)   Acceptable Pain Control/Comfort Level making progress toward outcome

## 2017-04-12 NOTE — PLAN OF CARE
Problem: Patient Care Overview (Adult)  Goal: Adult Individualization and Mutuality    04/12/17 1838   Individualization   Patient Specific Goals be HA free   Patient Specific Interventions Give PRN HA medication and scheduled depakote.          Problem: Pain, Acute (Adult)  Goal: Identify Related Risk Factors and Signs and Symptoms  Outcome: Ongoing (interventions implemented as appropriate)    04/12/17 0320   Pain, Acute   Related Risk Factors (Acute Pain) persistent pain;positioning;disease process   Signs and Symptoms (Acute Pain) facial mask of pain/grimace;sleep pattern alteration;questions meaning of pain;fatigue/weakness       Goal: Acceptable Pain Control/Comfort Level  Outcome: Ongoing (interventions implemented as appropriate)

## 2017-04-12 NOTE — CONSULTS
Neurology    Referring Provider: Dr. Petit    Reason for Consultation: headache      Chief complaint: headache    Subjective .     History of present illness:  Mr. Harding is a 57-year-old male with past medical history significant for type 2 diabetes mellitus, hypertension, migraine with aura who is admitted to the hospitalist service for status migrainosus.  He states that he developed a headache this past Saturday 04/08/2017 and it has been unbroken since.  He is normally followed by Dr. Archie Shoemaker in the [facility] neurology clinic and is treated with topiramate 100 mg daily and Depakote 1500 mg daily.  He does have a remote history of partial epilepsy and was placed on Depakote for this.  Additionally, he complains of generalized fatigue and diffuse body aches but no discrete hemiparesis.    Review of Systems: Positive for headaches.Otherwise complete review of systems was discussed with the patient and found to be negative except for that mentioned in history of present illness or in the initial H&P dated 04/11/2017    History  Past Medical History:   Diagnosis Date   • Anxiety    • Arthritis    • Asthma    • Brachial neuritis 1/19/2017   • CAD in native artery    • Cardiac disorder    • Chest pain    • COPD (chronic obstructive pulmonary disease)    • Depression    • Diabetes mellitus    • Dizziness    • Edema    • GERD (gastroesophageal reflux disease)    • H/O chest x-ray 07/04/2015    Mild Left base atelectasis   • H/O echocardiogram 07/05/2015    Normal LVSF. EF of 60-65%. Grade 1 diastolic dysfunction of the LV myocardium. No evidence of pericardial effusion   • History of herniated intervertebral disc     History of left L5-S1 disc herniation   • Hypercholesterolemia 11/2/2016   • Hypertension    • LOM (loss of memory) 1/19/2017   • Lower back pain     Right   • Measles    • Obstructive sleep apnea    • Palpitations    • Seizure disorder    • Shortness of breath 1/19/2017   • SOB (shortness of breath)     • Stroke    ,   Past Surgical History:   Procedure Laterality Date   • CARDIAC CATHETERIZATION     • CARPAL TUNNEL RELEASE Right    • CHOLECYSTECTOMY     • CORONARY ANGIOPLASTY WITH STENT PLACEMENT     • KNEE ARTHROSCOPY Bilateral    • NEUROPLASTY / TRANSPOSITION ULNAR NERVE AT ELBOW     • OTHER SURGICAL HISTORY      Foraminotomy and discectomy   ,   Family History   Problem Relation Age of Onset   • Hypertension Mother    • Alcohol abuse Father    • Heart disease Other    • Stroke Other    • Hypertension Other    • Other Other      Neurologic disorder   • Parkinsonism Other    • Stroke Other    • Heart disease Other    • Hypertension Other    • Heart disease Other    • Stroke Other    • Hypertension Other    ,   Social History   Substance Use Topics   • Smoking status: Former Smoker     Quit date: 1/1/1998   • Smokeless tobacco: None   • Alcohol use Yes      Comment: 3 PER YEAR   ,   Prescriptions Prior to Admission   Medication Sig Dispense Refill Last Dose   • topiramate (TOPAMAX) 100 MG tablet Take 100 mg by mouth Daily.      • albuterol (PROVENTIL HFA;VENTOLIN HFA) 108 (90 BASE) MCG/ACT inhaler ProAir  (90 Base) MCG/ACT Inhalation Aerosol Solution; Patient Sig: ProAir  (90 Base) MCG/ACT Inhalation Aerosol Solution ; 8; 0; 05-Oct-2015; Active   Taking   • albuterol (PROVENTIL) (2.5 MG/3ML) 0.083% nebulizer solution    Taking   • amLODIPine (NORVASC) 2.5 MG tablet Take 1 tablet by mouth daily.   Taking   • Ascorbic Acid (VITAMIN C PO) Take 1 tablet by mouth daily.   Taking   • aspirin 81 MG tablet Take 1 tablet by mouth daily.   Taking   • atenolol (TENORMIN) 25 MG tablet Take 1 tablet by mouth daily.   Taking   • butalbital-acetaminophen-caffeine (FIORICET) -40 MG per tablet Take 1-2 tablets by mouth Every 6 (Six) Hours As Needed for headaches. 12 tablet 0    • BYDUREON 2 MG pen-injector   0    • Cetirizine HCl 10 MG capsule Take 1 capsule by mouth daily.   Taking   • clopidogrel (PLAVIX)  75 MG tablet Take 1 tablet by mouth daily.   Taking   • Coenzyme Q10 (CO Q-10) 100 MG capsule Take 1 capsule by mouth daily.   Taking   • divalproex (DEPAKOTE) 500 MG 24 hr tablet Take 3 tablets by mouth Every Night. 90 tablet 11    • flunisolide (NASALIDE) 25 MCG/ACT (0.025%) solution nasal spray INHALE 1 SPRAY EVERY 12 HOURS 1 bottle 0    • furosemide (LASIX) 40 MG tablet Take 1 tablet by mouth daily.   Taking   • gabapentin (NEURONTIN) 300 MG capsule Take 1 capsule by mouth 3 (Three) Times a Day. 90 capsule 11    • HYDROcodone-acetaminophen (NORCO) 7.5-325 MG per tablet    Taking   • Insulin Glargine 100 UNIT/ML solution pen-injector    Taking   • Insulin Lispro, Human, 100 UNIT/ML solution pen-injector    Taking   • Insulin Pen Needle 31G X 5 MM misc    Taking   • INVOKANA 300 MG tablet Take 300 mg by mouth daily.  0 Taking   • ipratropium-albuterol (COMBIVENT RESPIMAT)  MCG/ACT inhaler    Taking   • Liraglutide 18 MG/3ML solution pen-injector    Taking   • lisinopril (PRINIVIL,ZESTRIL) 40 MG tablet Take 1 tablet by mouth daily.   Taking   • metFORMIN (GLUCOPHAGE) 1000 MG tablet Take 1 tablet by mouth 2 (two) times a day.   Not Taking   • montelukast (SINGULAIR) 10 MG tablet Take 1 tablet by mouth Daily.  0    • nabumetone (RELAFEN) 500 MG tablet Take 500 mg by mouth Daily.   Taking   • omeprazole (PriLOSEC) 20 MG capsule Take 1 capsule by mouth daily.   Taking   • potassium chloride (K-DUR,KLOR-CON) 20 MEQ CR tablet Take 1 tablet by mouth daily.   Taking   • simvastatin (ZOCOR) 20 MG tablet Take 1 tablet by mouth daily. In the evening   Taking   • SUMAtriptan (IMITREX) 100 MG tablet Take one tablet with headache onset and may repeat in two one time 12 tablet 6    • tiZANidine (ZANAFLEX) 4 MG tablet Take 1 tablet by mouth as needed.  0 Taking   • TOUJEO SOLOSTAR 300 UNIT/ML solution pen-injector Inject 150 unit marking on U-100 syringe as directed daily.  0 Taking   • traMADol (ULTRAM) 50 MG tablet Take 1  "tablet by mouth as needed.  0 Taking   • Vortioxetine HBr 10 MG tablet Take 1 tablet by mouth daily.   Taking    and Allergies:  Sulfa antibiotics; Codeine; and Morphine    Objective     Vital Signs   Blood pressure 121/68, pulse 61, temperature 97.7 °F (36.5 °C), temperature source Oral, resp. rate 18, height 70\" (177.8 cm), weight 260 lb (118 kg), SpO2 95 %.    Physical Exam:      Gen: Sitting in bedside chair with eyes open. In NAD. Appears stated age   Eyes: PERRL, conjuntivae/lids normal   ENT: External canals normal bilaterally. Dentition normal   Neck: Supple. No thyroid enlargement noted   Respiratory: CTA bilaterally. Respirations unlabored   CV: RRR, S1 and S2 nml. Radial pulses 2+ bilaterally.    Skin: No rashes noted on exposed skin. Normal tugor.   MSK: Normal bulk and tone. Nml ROM     Neurologic:   Mental status: Awake, alert, oriented x4. Follows commands. Speech fluent.    CN: PERRL, EOM intact, sensation intact in upper/mid/lower face bilaterally, facial movements symmetric, hearing intact to finger rub bilaterally, palate elevates symmetrically, tongue movements and SCMs strong bilaterally    Motor: Strength full (5/5) throughout in BUE and BLE    Reflexes: 2+ in BUE and 1+ patellar reflexes bilaterally.    Sensory: Decreased pinprick sensation in the distal lower extremity is bilaterally.  Intact in upper extremities   Coordination: No dysmetria noted   Gait: Not tested        Results Reviewed:     Labs reviewed  MRI brain personally reviewed.  No acute process seen.                 Assessment/Plan     1.  Migraine with aura with status migrainosus = currently on topiramate at home.  Recommend continuing this.  We will switch his oral Depakote for Depacon  mg Q8 hours for abortive therapy.  He is currently getting Toradol as needed.  We will also add Compazine and Excedrin as needed.  Additional analgesic meds per primary team's discretion.  We will avoid DHE given his history of CAD and " stent placement.      2.  Generalized weakness = recommend PT/OT evaluation    3.  Seizure disorder = continue topiramate. Will switch Depakote to Depacon IV as per #1. Switch back to oral Depakote upon discharge        Jina Menjivar MD  04/12/17  5:11 PM

## 2017-04-12 NOTE — PROGRESS NOTES
"Hospitalist Daily Progress Note    Date of Admission: 4/11/2017   LOS: 1 day   PCP: Ottoniel Toledo MD    Chief Complaint:  Migraine HA, generalized weakness, myalgias    Subjective   History of Present Illness  Pt still has migraine HA, now rated at 8/10, bright lights make it worse, no diplopia, some blurry vision but likely due to cataracts, reports \"kaleidoscope-type vision\" with his eyes closed   no nuchal rigidity, no fever, no acute back pain although he claims to have chronic back pain.  Eating breakfast, although he reports nausea but no vomiting.    Review of Systems  Respiratory: no cough, dyspnea  Cardiovascular: no chest pain, palpitations  Abdomen: No abdominal pain.  Neurologic: No focal weakness, although has residual right hand writing issues related to remote CVA.  All other systems reviewed and negative.    Objective   Physical Exam:  I have reviewed the vital signs.  Temp:  [97.6 °F (36.4 °C)-98.1 °F (36.7 °C)] 97.8 °F (36.6 °C)  Heart Rate:  [58-97] 67  Resp:  [16-18] 18  BP: (104-137)/(53-92) 133/74    Objective  General Appearance:    Alert, cooperative, no distress  Head:    Normocephalic, atraumatic  Eyes:    PERRL EOMI, Sclerae anicteric  Neck:   Supple, no mass  Lungs: Clear to auscultation bilaterally, respirations unlabored  Heart: Regular rate and rhythm, S1 and S2 normal, no murmur, rub or gallop  Abdomen:  Soft, non-tender, bowel sounds active, nondistended  Extremities: No clubbing, cyanosis, or edema to lower extremities  Pulses:  2+ and symmetric in distal lower extremities  Skin: No rashes   Neurologic: Oriented x3, CN 2-12 intact, speech is clear, no dysarthria, no focal weakness of extremities, although there is gen. Weakness.    Results Review:    I have reviewed the labs, radiology results and diagnostic studies.      Results from last 7 days  Lab Units 04/12/17  0427   WBC 10*3/mm3 4.97   HEMOGLOBIN g/dL 13.0*   PLATELETS 10*3/mm3 113*       Results from last 7 days  Lab Units " 04/12/17  0427   SODIUM mmol/L 138   POTASSIUM mmol/L 3.6   TOTAL CO2 mmol/L 25.0   CREATININE mg/dL 0.70   GLUCOSE mg/dL 132*       I have reviewed the medications.    ---------------------------------------------------------------------------------------------  Assessment/Plan   Assessment & Plan  Assessment/Problem List    Principal Problem:    Weakness generalized  Active Problems:    Coronary artery disease involving native coronary artery of native heart without angina pectoris    Hypercholesterolemia    Diabetic polyneuropathy associated with type 2 diabetes mellitus    Periodic headache syndrome, not intractable    Hypertension    Anxiety    Diabetes mellitus    Lower back pain       Plan  Migraine HA  -MRI Brain no acute findings.  -Is on topamax for prophylaxis, fioricet at home prn and sumatriptan prn, has tried triptan with little effect.  -Neurology consulted.  -ordered tramadol IV prn.    Myalgias  -Flu negative, CRP wnl, ESR, TSH wnl, CXR no acute disease.  -Blood Cx NGTD  -PT/OT consults.    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 Shoemaker with neurology.  -is on depakote, depakote levels wnl,  -continue ASA, Plavix, BP meds, statin.    DM2  -A1c 9.2, SSI, levemir, FSBS, under control here.    DVT prophylaxis:  heparin  Discharge Planning: I expect patient to be discharged home     Ron Bautista MD 04/12/17 8:28 AM

## 2017-04-13 LAB
ANA SER QL IA: NEGATIVE
GLUCOSE BLDC GLUCOMTR-MCNC: 132 MG/DL (ref 70–130)
GLUCOSE BLDC GLUCOMTR-MCNC: 155 MG/DL (ref 70–130)
GLUCOSE BLDC GLUCOMTR-MCNC: 171 MG/DL (ref 70–130)
GLUCOSE BLDC GLUCOMTR-MCNC: 244 MG/DL (ref 70–130)

## 2017-04-13 PROCEDURE — 25010000002 KETOROLAC TROMETHAMINE PER 15 MG: Performed by: FAMILY MEDICINE

## 2017-04-13 PROCEDURE — 97162 PT EVAL MOD COMPLEX 30 MIN: CPT

## 2017-04-13 PROCEDURE — 25010000002 METHYLPREDNISOLONE: Performed by: FAMILY MEDICINE

## 2017-04-13 PROCEDURE — 99213 OFFICE O/P EST LOW 20 MIN: CPT | Performed by: PSYCHIATRY & NEUROLOGY

## 2017-04-13 PROCEDURE — 94799 UNLISTED PULMONARY SVC/PX: CPT

## 2017-04-13 PROCEDURE — 97116 GAIT TRAINING THERAPY: CPT

## 2017-04-13 PROCEDURE — 25010000002 HEPARIN (PORCINE) PER 1000 UNITS: Performed by: HOSPITALIST

## 2017-04-13 PROCEDURE — 96376 TX/PRO/DX INJ SAME DRUG ADON: CPT

## 2017-04-13 PROCEDURE — G8979 MOBILITY GOAL STATUS: HCPCS

## 2017-04-13 PROCEDURE — 63710000001 INSULIN DETEMIR PER 5 UNITS: Performed by: HOSPITALIST

## 2017-04-13 PROCEDURE — 96365 THER/PROPH/DIAG IV INF INIT: CPT

## 2017-04-13 PROCEDURE — G0378 HOSPITAL OBSERVATION PER HR: HCPCS

## 2017-04-13 PROCEDURE — 96372 THER/PROPH/DIAG INJ SC/IM: CPT

## 2017-04-13 PROCEDURE — 82962 GLUCOSE BLOOD TEST: CPT

## 2017-04-13 PROCEDURE — 25010000002 PROCHLORPERAZINE EDISYLATE PER 10 MG: Performed by: PSYCHIATRY & NEUROLOGY

## 2017-04-13 PROCEDURE — 96375 TX/PRO/DX INJ NEW DRUG ADDON: CPT

## 2017-04-13 PROCEDURE — 99224 PR SBSQ OBSERVATION CARE/DAY 15 MINUTES: CPT | Performed by: FAMILY MEDICINE

## 2017-04-13 PROCEDURE — 96367 TX/PROPH/DG ADDL SEQ IV INF: CPT

## 2017-04-13 PROCEDURE — G8978 MOBILITY CURRENT STATUS: HCPCS

## 2017-04-13 RX ORDER — METHYLPREDNISOLONE SODIUM SUCCINATE 125 MG/2ML
500 INJECTION, POWDER, LYOPHILIZED, FOR SOLUTION INTRAMUSCULAR; INTRAVENOUS ONCE
Status: DISCONTINUED | OUTPATIENT
Start: 2017-04-13 | End: 2017-04-13 | Stop reason: SDUPTHER

## 2017-04-13 RX ADMIN — INSULIN LISPRO 2 UNITS: 100 INJECTION, SOLUTION INTRAVENOUS; SUBCUTANEOUS at 16:59

## 2017-04-13 RX ADMIN — HEPARIN SODIUM 5000 UNITS: 5000 INJECTION, SOLUTION INTRAVENOUS; SUBCUTANEOUS at 21:20

## 2017-04-13 RX ADMIN — GABAPENTIN 300 MG: 300 CAPSULE ORAL at 14:29

## 2017-04-13 RX ADMIN — METHYLPREDNISOLONE SODIUM SUCCINATE 500 MG: 500 INJECTION, POWDER, FOR SOLUTION INTRAMUSCULAR; INTRAVENOUS at 15:40

## 2017-04-13 RX ADMIN — MONTELUKAST SODIUM 10 MG: 10 TABLET, FILM COATED ORAL at 21:20

## 2017-04-13 RX ADMIN — PROCHLORPERAZINE EDISYLATE 10 MG: 5 INJECTION INTRAMUSCULAR; INTRAVENOUS at 20:18

## 2017-04-13 RX ADMIN — KETOROLAC TROMETHAMINE 30 MG: 30 INJECTION, SOLUTION INTRAMUSCULAR at 12:37

## 2017-04-13 RX ADMIN — ATENOLOL 25 MG: 25 TABLET ORAL at 09:37

## 2017-04-13 RX ADMIN — SODIUM CHLORIDE 75 ML/HR: 9 INJECTION, SOLUTION INTRAVENOUS at 02:59

## 2017-04-13 RX ADMIN — INSULIN DETEMIR 50 UNITS: 100 INJECTION, SOLUTION SUBCUTANEOUS at 21:20

## 2017-04-13 RX ADMIN — SODIUM CHLORIDE 75 ML/HR: 9 INJECTION, SOLUTION INTRAVENOUS at 20:18

## 2017-04-13 RX ADMIN — HEPARIN SODIUM 5000 UNITS: 5000 INJECTION, SOLUTION INTRAVENOUS; SUBCUTANEOUS at 14:29

## 2017-04-13 RX ADMIN — CLOPIDOGREL BISULFATE 75 MG: 75 TABLET, FILM COATED ORAL at 09:37

## 2017-04-13 RX ADMIN — GABAPENTIN 300 MG: 300 CAPSULE ORAL at 21:23

## 2017-04-13 RX ADMIN — VALPROATE SODIUM 950 MG: 100 INJECTION, SOLUTION INTRAVENOUS at 02:59

## 2017-04-13 RX ADMIN — INSULIN LISPRO 2 UNITS: 100 INJECTION, SOLUTION INTRAVENOUS; SUBCUTANEOUS at 12:19

## 2017-04-13 RX ADMIN — KETOROLAC TROMETHAMINE 30 MG: 30 INJECTION, SOLUTION INTRAMUSCULAR at 06:39

## 2017-04-13 RX ADMIN — HEPARIN SODIUM 5000 UNITS: 5000 INJECTION, SOLUTION INTRAVENOUS; SUBCUTANEOUS at 06:34

## 2017-04-13 RX ADMIN — AMLODIPINE BESYLATE 2.5 MG: 2.5 TABLET ORAL at 09:37

## 2017-04-13 RX ADMIN — LISINOPRIL 40 MG: 20 TABLET ORAL at 09:37

## 2017-04-13 RX ADMIN — INSULIN LISPRO 4 UNITS: 100 INJECTION, SOLUTION INTRAVENOUS; SUBCUTANEOUS at 22:00

## 2017-04-13 RX ADMIN — ASPIRIN 81 MG: 81 TABLET, CHEWABLE ORAL at 09:37

## 2017-04-13 RX ADMIN — TOPIRAMATE 100 MG: 100 TABLET, FILM COATED ORAL at 09:37

## 2017-04-13 RX ADMIN — VALPROATE SODIUM 950 MG: 100 INJECTION, SOLUTION INTRAVENOUS at 17:55

## 2017-04-13 RX ADMIN — KETOROLAC TROMETHAMINE 30 MG: 30 INJECTION, SOLUTION INTRAMUSCULAR at 20:18

## 2017-04-13 RX ADMIN — VALPROATE SODIUM 950 MG: 100 INJECTION, SOLUTION INTRAVENOUS at 09:37

## 2017-04-13 RX ADMIN — ATORVASTATIN CALCIUM 40 MG: 40 TABLET, FILM COATED ORAL at 21:19

## 2017-04-13 RX ADMIN — GABAPENTIN 300 MG: 300 CAPSULE ORAL at 06:34

## 2017-04-13 NOTE — PROGRESS NOTES
Daily Progress Note  Neurology     LOS: 1 day     Subjective     Interval History:  No acute events overnight. Pt still endorses headache, improved while sleeping    ROS: positive for headache    Objective     Vital signs in last 24 hours:  Temp:  [97.7 °F (36.5 °C)-98.3 °F (36.8 °C)] 98.1 °F (36.7 °C)  Heart Rate:  [60-75] 75  Resp:  [14-18] 18  BP: (118-154)/(66-85) 121/77      Physical Exam:   General: Lying in bed with eyes open. In NAD.     Respiratory: Respirations unlabored   CV: RRR       Neurologic Exam:   Mental status: Awake, alert, Follows commands. Speech fluent   CN: II-XII appear intact   Motor: Strength full (5/5) throughout in BUE and BLE                          Results from last 7 days  Lab Units 04/12/17  0427   WBC 10*3/mm3 4.97   HEMOGLOBIN g/dL 13.0*   HEMATOCRIT % 40.3   PLATELETS 10*3/mm3 113*           Results Review:  Labs reviewed      Assessment/Plan     Principal Problem:    Weakness generalized  Active Problems:    Coronary artery disease involving native coronary artery of native heart without angina pectoris    Hypercholesterolemia    Diabetic polyneuropathy associated with type 2 diabetes mellitus    Periodic headache syndrome, not intractable    Hypertension    Anxiety    Diabetes mellitus    Lower back pain        1. Migraine with aura with status migrainosus = On topiramate 100mg daily and Depacon 950mg IV Q8 hours for abortive therapy. Also on Toradol, Compazine and Excedrine as needed for headache. Avoiding DHE given his history of CAD. Will also add one-time dose of Solumedrol 500mg IV.    2. Seizure disorder = stable. on topiramate and Depacon. Switch back to Depakote ER on discharge        Jina Menjivar MD  04/13/17  1:37 PM

## 2017-04-13 NOTE — PROGRESS NOTES
Acute Care - Physical Therapy Initial Evaluation  [facility]     Patient Name: Denzel Harding  : 1960  MRN: [id number]  Today's Date: 2017   Onset of Illness/Injury or Date of Surgery Date: 17  Date of Referral to PT: 17  Referring Physician: MD Michele      Admit Date: 2017     Visit Dx:    ICD-10-CM ICD-9-CM   1. Weakness generalized R53.1 780.79   2. Intractable episodic headache, unspecified headache type R51 784.0   3. Impaired mobility and ADLs Z74.09 799.89   4. Impaired functional mobility, balance, gait, and endurance Z74.09 V49.89     Patient Active Problem List   Diagnosis   • Coronary artery disease involving native coronary artery of native heart without angina pectoris   • Pre-operative cardiovascular examination   • Lumbar radiculopathy   • Hypercholesterolemia   • Diabetic polyneuropathy associated with type 2 diabetes mellitus   • Periodic headache syndrome, not intractable   • Palpitations   • Chest pain   • Shortness of breath   • Edema   • Hypertension   • GERD (gastroesophageal reflux disease)   • Brachial neuritis   • Cervical radiculopathy   • LOM (loss of memory)   • Weakness generalized   • Anxiety   • Diabetes mellitus   • Lower back pain     Past Medical History:   Diagnosis Date   • Anxiety    • Arthritis    • Asthma    • Brachial neuritis 2017   • CAD in native artery    • Cardiac disorder    • Chest pain    • COPD (chronic obstructive pulmonary disease)    • Depression    • Diabetes mellitus    • Dizziness    • Edema    • GERD (gastroesophageal reflux disease)    • H/O chest x-ray 2015    Mild Left base atelectasis   • H/O echocardiogram 2015    Normal LVSF. EF of 60-65%. Grade 1 diastolic dysfunction of the LV myocardium. No evidence of pericardial effusion   • History of herniated intervertebral disc     History of left L5-S1 disc herniation   • Hypercholesterolemia 2016   • Hypertension    • LOM (loss of memory) 2017   •  Lower back pain     Right   • Measles    • Obstructive sleep apnea    • Palpitations    • Seizure disorder    • Shortness of breath 1/19/2017   • SOB (shortness of breath)    • Stroke      Past Surgical History:   Procedure Laterality Date   • CARDIAC CATHETERIZATION     • CARPAL TUNNEL RELEASE Right    • CHOLECYSTECTOMY     • CORONARY ANGIOPLASTY WITH STENT PLACEMENT     • KNEE ARTHROSCOPY Bilateral    • NEUROPLASTY / TRANSPOSITION ULNAR NERVE AT ELBOW     • OTHER SURGICAL HISTORY      Foraminotomy and discectomy          PT ASSESSMENT (last 72 hours)      PT Evaluation       04/13/17 0850 04/12/17 1456    Rehab Evaluation    Document Type evaluation  - evaluation  -    Subjective Information agree to therapy  - agree to therapy;complains of;weakness  -    Patient Effort, Rehab Treatment good  -KM good  -    Symptoms Noted During/After Treatment fatigue   prior to rx, pt lethargic, pt attributed to meds  - fatigue;dizziness  -    Symptoms Noted Comment  VSS throughout  -    General Information    Patient Profile Review yes  -KM yes  -    Onset of Illness/Injury or Date of Surgery Date 04/11/17  -KM 04/11/17  -    Referring Physician MD Michele  - MD Michele  -    General Observations  Pt received in supine, dtr present  -    Pertinent History Of Current Problem Pt admitted via the E.D.with c/o 2 weeks progressivwe weakness and 4 day h/o migraine. MRI of brain w/o acute findings, flu-, CXR -, Depakote levels wfls  - Pt is 57 YOM who presented to ED with 2 week h/x of generalized weakness.  It got progressively worse over past 2 days along with a headache and pain from head to toe  -    Precautions/Limitations fall precautions  - fall precautions;seizure precautions  -    Prior Level of Function independent:;all household mobility;community mobility;gait;transfer;ADL's;bed mobility  - independent:;all household mobility;community mobility;gait;transfer;bed mobility;ADL's   Walked  with SPC  -    Equipment Currently Used at Home cane, straight;walker, rolling;oxygen   Bi pap  -KM cane, straight;other (see comments);walker, rolling   Bipap  -    Plans/Goals Discussed With patient and family;agreed upon  -KM patient and family;agreed upon  -    Risks Reviewed patient and family:;LOB  -KM patient and family:;LOB;nausea/vomiting;dizziness;increased discomfort;change in vital signs  -    Benefits Reviewed patient and family:;improve function  -KM patient and family:;improve function;increase independence;increase strength;increase balance;increase knowledge  -    Barriers to Rehab medically complex;previous functional deficit  -KM medically complex;previous functional deficit  -    Living Environment    Lives With spouse  -KM spouse  -    Living Arrangements house  -KM house  -    Home Accessibility ramps present at home  -KM ramps present at home   Walk in and tub shower  -    Living Environment Comment  Pt's spouse works during the day.  His dtr is a student at [employer]    Clinical Impression    Date of Referral to PT 04/12/17  -KM     PT Diagnosis impaired mobility  -KM     Patient/Family Goals Statement return to OF  -     Criteria for Skilled Therapeutic Interventions Met yes  -KM     Rehab Potential good, to achieve stated therapy goals  -KM     Vital Signs    Pre Systolic BP Rehab  119  -MC    Pre Treatment Diastolic BP  75  -MC    Intra Systolic BP Rehab  124  -MC    Intra Treatment Diastolic BP  79  -MC    Post Systolic BP Rehab  124  -MC    Post Treatment Diastolic BP  73  -MC    Pretreatment Heart Rate (beats/min)  64  -MC    Posttreatment Heart Rate (beats/min)  63  -MC    Pre SpO2 (%)  97  -MC    O2 Delivery Pre Treatment  room air  -MC    Post SpO2 (%)  96  -MC    O2 Delivery Post Treatment  room air  -MC    Pre Patient Position  Supine  -MC    Intra Patient Position  Sitting  -MC    Post Patient Position  Sitting  -    Pain Assessment    Pain Assessment 0-10   "-KM 0-10  -    Pain Score  8  -    Post Pain Score 8  -KM 8  -    Pain Type Acute pain  -KM Acute pain  -    Pain Location Head  -KM Head  -    Pain Intervention(s) Medication (See MAR);Repositioned;Ambulation/increased activity  -KM Repositioned  -    Vision Assessment/Intervention    Visual Impairment WFL with corrective lenses  - WFL with corrective lenses;blurred;other (see comments)  -    Vision Comment  Pt reporting to see \"kaleidescope visual patterns,\" able to read small print and ID colors correcty.  Blurred vision 2/2 cataracts  -    Cognitive Assessment/Intervention    Current Cognitive/Communication Assessment functional  -KM functional  -    Orientation Status oriented x 4  -KM oriented x 4  -    Follows Commands/Answers Questions able to follow multi-step instructions;100% of the time  -% of the time  [facility]    Personal Safety WNL/WFL  -KM WNL/WFL  -    Personal Safety Interventions fall prevention program maintained  - fall prevention program maintained;gait belt;muscle strengthening facilitated;nonskid shoes/slippers when out of bed;supervised activity  -    Short/Long Term Memory  mild impairment, short term memory;intact long term memory   residual deficit from CVA  -    ROM (Range of Motion)    General ROM no range of motion deficits identified  -KM no range of motion deficits identified  -    General ROM Detail  for BUE . Defer BLE to PT  -    MMT (Manual Muscle Testing)    General MMT Assessment no strength deficits identified  -KM upper extremity strength deficits identified  -    General MMT Assessment Detail  BUE 4/5. Defer BLE to PT  -    Bed Mobility, Assessment/Treatment    Bed Mobility, Assistive Device  bed rails;head of bed elevated  -    Bed Mobility, Roll Right, [place] independent  - supervision required  -    Bed Mobility, Scoot/Bridge, [place] independent  - supervision required  -    Bed Mob, Supine to Sit, [place] " independent  -KM supervision required  -    Transfer Assessment/Treatment    Transfers, Bed-Chair [place]  contact guard assist;2 person assist required;upper extremity support  -    Transfers, Sit-Stand [place] supervision required  -KM contact guard assist;2 person assist required  -    Transfers, Stand-Sit [place] supervision required  -KM contact guard assist;2 person assist required  -    Toilet Transfer, [place]  contact guard assist  -    Transfer, Safety Issues balance decreased during turns  -KM balance decreased during turns;step length decreased;loses balance backward  -    Transfer, Impairments  strength decreased;impaired balance;coordination impaired  -    Transfer, Comment  Cues for hand placement, safe transfer technique  -    Gait Assessment/Treatment    Gait, [place] Level contact guard assist  -KM     Gait, Assistive Device rolling walker  -KM     Gait, Distance (Feet) 150  -KM     Gait, Gait Deviations han decreased  -KM     Gait, Safety Issues step length decreased;weight-shifting ability decreased  -KM     Motor Skills/Interventions    Motor Response Observations  ataxia  -    Additional Documentation  Balance Skills Training (Group);Fine Motor Coordination Training (Group);Gross Motor Coordination Training (Group)  -    Balance Skills Training    Sitting-Level of Assistance Distant supervision  -KM Distant supervision  -    Sitting-Balance Support  No upper extremity supported  -    Standing-Level of Assistance Contact guard  -KM Contact guard;x2  -MC    Static Standing Balance Support  Right upper extremity supported  -    Gait Balance-Level of Assistance  Minimum assistance  -    Gait Balance Support  Right upper extremity supported  -    Fine Motor Coordination Training    Opposition  Right:;Left:;intact  -    Gross Motor Coordination Training    Gross Motor Skill, Impairments Detail  BUE WFL  -    Sensory  Assessment/Intervention    Sensory Impairment  numbness;tingling  -    Light Touch  LUE;RUE;LLE;RLE;Head/Neck  -    LUE Light Touch  mild impairment  -    RUE Light Touch  mild impairment  -    LLE Light Touch  mild impairment  -    RLE Light Touch  mild impairment  -    Head/Neck Light Touch  mild impairment  -    Positioning and Restraints    Pre-Treatment Position in bed  - in bed  -    Post Treatment Position bed  -KM chair  -    In Bed sitting EOB;call light within reach;encouraged to call for assist;with family/caregiver;notified nsg  -     In Chair  notified nsg;reclined;call light within reach;encouraged to call for assist;with family/caregiver  -      04/11/17 1812 04/11/17 1742    General Information    Equipment Currently Used at Home  cane, straight;other (see comments)   BIPAP-serviced by [facility]; Nebulizer  -ST    Living Environment    Lives With spouse  -DG child(yenni), adult;spouse  -ST    Living Arrangements house  -DG house  -ST    Home Accessibility no concerns  -DG     Stair Railings at Home none  -DG     Type of Financial/Environmental Concern none  -DG     Transportation Available family or friend will provide  -DG family or friend will provide  -ST    Living Environment Comment independent  -DG       User Key  (r) = Recorded By, (t) = Taken By, (c) = Cosigned By    Initials Name Provider Type    SOL De, PT Physical Therapist    ST Yas Song, RN Case Manager    REBEKA Tubbs, OT Occupational Therapist    LEVI Myrick, RN Registered Nurse          Physical Therapy Education     Title: PT OT SLP Therapies (Active)     Topic: Physical Therapy (Done)     Point: Mobility training (Done)    Learning Progress Summary    Learner Readiness Method Response Comment Documented by Status   Patient Acceptance ONUR OLSEN   04/13/17 0988 Done               Point: Home exercise program (Done)    Learning Progress Summary    Learner Readiness Method Response  Comment Documented by Status   Patient Acceptance E [facility] 04/13/17 0958 Done               Point: Body mechanics (Done)    Learning Progress Summary    Learner Readiness Method Response Comment Documented by Status   Patient Acceptance E [facility] 04/13/17 0958 Done               Point: Precautions (Done)    Learning Progress Summary    Learner Readiness Method Response Comment Documented by Status   Patient Acceptance E [facility] 04/13/17 0958 Done                      User Key     Initials Effective Dates Name Provider Type Discipline     06/19/15 -  Faith De, PT Physical Therapist PT                PT Recommendation and Plan  Anticipated Discharge Disposition: home with assist (may benefit from outpt therapy)  PT Frequency: daily  Plan of Care Review  Plan Of Care Reviewed With: patient, spouse  Outcome Summary/Follow up Plan: Pt eval completed. Pt presents with decreased functional mobility with high level balance deficits to benefit from skilled svcs to regain independence and promote safety. Recommend outpt svcs with d/c home          IP PT Goals       04/13/17 0959          Bed Mobility PT STG    Bed Mobility PT STG, Date Established 04/13/17  -KM      Bed Mobility PT STG, Time to Achieve 2 wks  -KM      Bed Mobility PT STG, Activity Type all bed mobility  -KM      Bed Mobility PT STG, [place] Level independent  -KM      Transfer Training PT STG    Transfer Training PT STG, Date Established 04/13/17  -KM      Transfer Training PT STG, Time to Achieve 2 wks  -KM      Transfer Training PT STG, Activity Type all transfers  -KM      Transfer Training PT STG, [place] Level independent  -KM      Gait Training PT STG    Gait Training Goal PT STG, Date Established 04/13/17  -KM      Gait Training Goal PT STG, Time to Achieve 2 wks  -KM      Gait Training Goal PT STG, [place] Level independent  -KM      Gait Training Goal PT STG, Assist Device cane, straight  -KM      Gait Training Goal PT  STG, Distance to Achieve 200  -KM        User Key  (r) = Recorded By, (t) = Taken By, (c) = Cosigned By    Initials Name Provider Type    SOL De, NARAYAN Physical Therapist                Outcome Measures       04/13/17 0850 04/12/17 1456       How much help from another person do you currently need...    Turning from your back to your side while in flat bed without using bedrails? 4  -KM      Moving from lying on back to sitting on the side of a flat bed without bedrails? 4  -KM      Moving to and from a bed to a chair (including a wheelchair)? 3  -KM      Standing up from a chair using your arms (e.g., wheelchair, bedside chair)? 3  -KM      Climbing 3-5 steps with a railing? 3  -KM      To walk in hospital room? 3  -KM      AM-PAC 6 Clicks Score 20  -KM      How much help from another is currently needed...    Putting on and taking off regular lower body clothing?  3  -MC     Bathing (including washing, rinsing, and drying)  3  -MC     Toileting (which includes using toilet bed pan or urinal)  3  -MC     Putting on and taking off regular upper body clothing  3  -MC     Taking care of personal grooming (such as brushing teeth)  3  -MC     Eating meals  4  -MC     Score  19  -MC     Modified [place] Scale    Modified [place] Scale  4 - Moderately severe disability.  Unable to walk without assistance, and unable to attend to own bodily needs without assistance.  -MC     Functional Assessment    Outcome Measure Options AM-PAC 6 Clicks Basic Mobility (PT)  -KM AM-PAC 6 Clicks Daily Activity (OT);Modified [place]  -       User Key  (r) = Recorded By, (t) = Taken By, (c) = Cosigned By    Initials Name Provider Type    SOL De, PT Physical Therapist    REBEKA Tubbs, OT Occupational Therapist           Time Calculation:         PT Charges       04/13/17 1002          Time Calculation    Start Time 0850  -KM      PT Received On 04/13/17  -KM      PT Goal Re-Cert Due Date 04/23/17  -KM       Time Calculation- PT    Total Timed Code Minutes- PT 12 minute(s)  -SOL        User Key  (r) = Recorded By, (t) = Taken By, (c) = Cosigned By    Initials Name Provider Type    SOL De, PT Physical Therapist          Therapy Charges for Today     Code Description Service Date Service Provider Modifiers Qty    19857610640 HC PT MOBILITY CURRENT 4/13/2017 Faith De, PT GP,  1    26015989018 HC PT MOBILITY PROJECTED 4/13/2017 Faith De, PT GP,  1    61879683845 HC PT EVAL MOD COMPLEXITY 3 4/13/2017 Faith De, PT GP 1    05411116247 HC GAIT TRAINING EA 15 MIN 4/13/2017 Faith De, PT GP 1          PT G-Codes  Outcome Measure Options: AM-PAC 6 Clicks Basic Mobility (PT)  Score: 20  Functional Limitation: Mobility: Walking and moving around  Mobility: Walking and Moving Around Current Status (): At least 20 percent but less than 40 percent impaired, limited or restricted  Mobility: Walking and Moving Around Goal Status (): 0 percent impaired, limited or restricted      Faith De, PT  4/13/2017

## 2017-04-13 NOTE — PROGRESS NOTES
"Hospitalist Daily Progress Note    Date of Admission: 4/11/2017   LOS: 1 day   PCP: Ottoniel Toledo MD    Chief Complaint:  Migraine HA, generalized weakness, myalgias    Subjective   History of Present Illness  Pt still has migraine HA, rated at 8/10, bright lights make it worse, no diplopia, some blurry vision but likely due to cataracts, reports \"kaleidoscope-type vision\" with his eyes closed.  no nuchal rigidity, no fever, no acute back pain although he claims to have chronic back pain.  reports nausea but no vomiting.    Review of Systems  Respiratory: no cough, dyspnea  Cardiovascular: no chest pain, palpitations  Abdomen: No abdominal pain.  Neurologic: No focal weakness, although has residual right hand writing issues related to remote CVA.  All other systems reviewed and negative.    Objective   Physical Exam:  I have reviewed the vital signs.  Temp:  [97.7 °F (36.5 °C)-98.3 °F (36.8 °C)] 98.2 °F (36.8 °C)  Heart Rate:  [60-66] 63  Resp:  [14-18] 18  BP: (118-154)/(66-85) 128/75    Objective  General Appearance:   Alert, cooperative, no distress  Head:   Normocephalic, atraumatic  Eyes:   PERRL EOMI, Sclerae anicteric  Neck:  Supple, no mass  Lungs: Clear to auscultation bilaterally, respirations unlabored  Heart: Regular rate and rhythm, S1 and S2 normal, no murmur, rub or gallop  Abdomen: Soft, non-tender, bowel sounds active, nondistended  Extremities: No clubbing, cyanosis, or edema to lower extremities  Pulses: 2+ and symmetric in distal lower extremities  Skin: No rashes   Neurologic: Oriented x3, CN 2-12 intact, speech is clear, no dysarthria, no focal weakness of extremities, although there is gen. Weakness.    Results Review:    I have reviewed the labs, radiology results and diagnostic studies.      Results from last 7 days  Lab Units 04/12/17  0427   WBC 10*3/mm3 4.97   HEMOGLOBIN g/dL 13.0*   PLATELETS 10*3/mm3 113*       Results from last 7 days  Lab Units 04/12/17  0427   SODIUM mmol/L 138 "   POTASSIUM mmol/L 3.6   TOTAL CO2 mmol/L 25.0   CREATININE mg/dL 0.70   GLUCOSE mg/dL 132*       I have reviewed the medications.    ---------------------------------------------------------------------------------------------  Assessment/Plan   Assessment & Plan  Assessment/Problem List    Principal Problem:    Weakness generalized  Active Problems:    Coronary artery disease involving native coronary artery of native heart without angina pectoris    Hypercholesterolemia    Diabetic polyneuropathy associated with type 2 diabetes mellitus    Periodic headache syndrome, not intractable    Hypertension    Anxiety    Diabetes mellitus    Lower back pain       Plan  Migraine HA  -MRI Brain no acute findings.  -Neurology consulted, continued topiramate, switched oral depakote to Depacon IV  -Toradol IV prn, compazine and excedrin prn.  -one time dose of solumedrol IV  -avoid DHE given his history of CAD and stent placement.     Myalgias  -Flu negative, CRP wnl, ESR, TSH wnl, CXR no acute disease.  -Blood Cx NGTD  -PT/OT consults, not a rehab candidate.     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 Shoemaker with neurology.  -is on depakote, depakote levels wnl,  -continue ASA, Plavix, BP meds, statin.     DM2  -A1c 9.2, SSI, levemir, FSBS, under control here.     DVT prophylaxis: heparin  Discharge Planning: I expect patient to be discharged home in 1-2 days.    Ron Bautista MD 04/13/17 10:19 AM

## 2017-04-13 NOTE — PLAN OF CARE
Problem: Patient Care Overview (Adult)  Goal: Plan of Care Review  Outcome: Ongoing (interventions implemented as appropriate)    04/13/17 0959   Coping/Psychosocial Response Interventions   Plan Of Care Reviewed With patient;spouse   Outcome Evaluation   Outcome Summary/Follow up Plan Pt eval completed. Pt presents with decreased functional mobility with high level balance deficits to benefit from skilled svcs to regain independence and promote safety. Recommend outpt svcs with d/c home         Problem: Inpatient Physical Therapy  Goal: Bed Mobility Goal STG- PT  Outcome: Ongoing (interventions implemented as appropriate)    04/13/17 0959   Bed Mobility PT STG   Bed Mobility PT STG, Date Established 04/13/17   Bed Mobility PT STG, Time to Achieve 2 wks   Bed Mobility PT STG, Activity Type all bed mobility   Bed Mobility PT STG, [place] Level independent       Goal: Transfer Training Goal 1 STG- PT  Outcome: Ongoing (interventions implemented as appropriate)    04/13/17 0959   Transfer Training PT STG   Transfer Training PT STG, Date Established 04/13/17   Transfer Training PT STG, Time to Achieve 2 wks   Transfer Training PT STG, Activity Type all transfers   Transfer Training PT STG, [place] Level independent       Goal: Gait Training Goal STG- PT  Outcome: Ongoing (interventions implemented as appropriate)    04/13/17 0959   Gait Training PT STG   Gait Training Goal PT STG, Date Established 04/13/17   Gait Training Goal PT STG, Time to Achieve 2 wks   Gait Training Goal PT STG, [place] Level independent   Gait Training Goal PT STG, Assist Device cane, straight   Gait Training Goal PT STG, Distance to Achieve 200

## 2017-04-13 NOTE — PLAN OF CARE
Problem: Pain, Acute (Adult)  Goal: Acceptable Pain Control/Comfort Level  Outcome: Ongoing (interventions implemented as appropriate)    04/13/17 0659   Pain, Acute (Adult)   Acceptable Pain Control/Comfort Level making progress toward outcome

## 2017-04-14 LAB
GLUCOSE BLDC GLUCOMTR-MCNC: 219 MG/DL (ref 70–130)
GLUCOSE BLDC GLUCOMTR-MCNC: 258 MG/DL (ref 70–130)
GLUCOSE BLDC GLUCOMTR-MCNC: 306 MG/DL (ref 70–130)
GLUCOSE BLDC GLUCOMTR-MCNC: 307 MG/DL (ref 70–130)
VALPROATE SERPL-MCNC: 101 MCG/ML (ref 50–150)

## 2017-04-14 PROCEDURE — G0378 HOSPITAL OBSERVATION PER HR: HCPCS

## 2017-04-14 PROCEDURE — 82962 GLUCOSE BLOOD TEST: CPT

## 2017-04-14 PROCEDURE — 25010000002 HEPARIN (PORCINE) PER 1000 UNITS: Performed by: HOSPITALIST

## 2017-04-14 PROCEDURE — 99212 OFFICE O/P EST SF 10 MIN: CPT | Performed by: PSYCHIATRY & NEUROLOGY

## 2017-04-14 PROCEDURE — 97110 THERAPEUTIC EXERCISES: CPT

## 2017-04-14 PROCEDURE — 25010000002 HYDROMORPHONE PER 4 MG: Performed by: HOSPITALIST

## 2017-04-14 PROCEDURE — 63710000001 INSULIN DETEMIR PER 5 UNITS: Performed by: FAMILY MEDICINE

## 2017-04-14 PROCEDURE — 96372 THER/PROPH/DIAG INJ SC/IM: CPT

## 2017-04-14 PROCEDURE — 97530 THERAPEUTIC ACTIVITIES: CPT

## 2017-04-14 PROCEDURE — 97116 GAIT TRAINING THERAPY: CPT

## 2017-04-14 PROCEDURE — 25010000002 MAGNESIUM SULFATE IN D5W 1G/100ML (PREMIX) 10-5 MG/ML-% SOLUTION: Performed by: PSYCHIATRY & NEUROLOGY

## 2017-04-14 PROCEDURE — 96376 TX/PRO/DX INJ SAME DRUG ADON: CPT

## 2017-04-14 PROCEDURE — 80164 ASSAY DIPROPYLACETIC ACD TOT: CPT | Performed by: PSYCHIATRY & NEUROLOGY

## 2017-04-14 PROCEDURE — 25010000002 PROCHLORPERAZINE EDISYLATE PER 10 MG: Performed by: PSYCHIATRY & NEUROLOGY

## 2017-04-14 PROCEDURE — 25010000002 KETOROLAC TROMETHAMINE PER 15 MG: Performed by: FAMILY MEDICINE

## 2017-04-14 PROCEDURE — 99224 PR SBSQ OBSERVATION CARE/DAY 15 MINUTES: CPT | Performed by: FAMILY MEDICINE

## 2017-04-14 PROCEDURE — 94799 UNLISTED PULMONARY SVC/PX: CPT

## 2017-04-14 RX ADMIN — AMLODIPINE BESYLATE 2.5 MG: 2.5 TABLET ORAL at 08:21

## 2017-04-14 RX ADMIN — PROCHLORPERAZINE EDISYLATE 10 MG: 5 INJECTION INTRAMUSCULAR; INTRAVENOUS at 20:04

## 2017-04-14 RX ADMIN — ATORVASTATIN CALCIUM 40 MG: 40 TABLET, FILM COATED ORAL at 20:04

## 2017-04-14 RX ADMIN — PROCHLORPERAZINE EDISYLATE 10 MG: 5 INJECTION INTRAMUSCULAR; INTRAVENOUS at 15:28

## 2017-04-14 RX ADMIN — LISINOPRIL 40 MG: 20 TABLET ORAL at 08:21

## 2017-04-14 RX ADMIN — MAGNESIUM SULFATE IN DEXTROSE 1 G: 10 INJECTION, SOLUTION INTRAVENOUS at 15:35

## 2017-04-14 RX ADMIN — SODIUM CHLORIDE 75 ML/HR: 9 INJECTION, SOLUTION INTRAVENOUS at 10:05

## 2017-04-14 RX ADMIN — INSULIN LISPRO 4 UNITS: 100 INJECTION, SOLUTION INTRAVENOUS; SUBCUTANEOUS at 08:22

## 2017-04-14 RX ADMIN — HEPARIN SODIUM 5000 UNITS: 5000 INJECTION, SOLUTION INTRAVENOUS; SUBCUTANEOUS at 05:46

## 2017-04-14 RX ADMIN — VALPROATE SODIUM 950 MG: 100 INJECTION, SOLUTION INTRAVENOUS at 20:01

## 2017-04-14 RX ADMIN — HEPARIN SODIUM 5000 UNITS: 5000 INJECTION, SOLUTION INTRAVENOUS; SUBCUTANEOUS at 14:41

## 2017-04-14 RX ADMIN — INSULIN LISPRO 7 UNITS: 100 INJECTION, SOLUTION INTRAVENOUS; SUBCUTANEOUS at 21:08

## 2017-04-14 RX ADMIN — ASPIRIN 81 MG: 81 TABLET, CHEWABLE ORAL at 08:21

## 2017-04-14 RX ADMIN — ACETAMINOPHEN, ASPIRIN AND CAFFEINE 2 TABLET: 250; 250; 65 TABLET, FILM COATED ORAL at 08:34

## 2017-04-14 RX ADMIN — HYDROMORPHONE HYDROCHLORIDE 0.5 MG: 1 INJECTION, SOLUTION INTRAMUSCULAR; INTRAVENOUS; SUBCUTANEOUS at 20:21

## 2017-04-14 RX ADMIN — TOPIRAMATE 100 MG: 100 TABLET, FILM COATED ORAL at 08:21

## 2017-04-14 RX ADMIN — SODIUM CHLORIDE 75 ML/HR: 9 INJECTION, SOLUTION INTRAVENOUS at 19:03

## 2017-04-14 RX ADMIN — HYDROMORPHONE HYDROCHLORIDE 0.5 MG: 1 INJECTION, SOLUTION INTRAMUSCULAR; INTRAVENOUS; SUBCUTANEOUS at 14:51

## 2017-04-14 RX ADMIN — GABAPENTIN 300 MG: 300 CAPSULE ORAL at 14:42

## 2017-04-14 RX ADMIN — ACETAMINOPHEN, ASPIRIN AND CAFFEINE 2 TABLET: 250; 250; 65 TABLET, FILM COATED ORAL at 20:04

## 2017-04-14 RX ADMIN — MONTELUKAST SODIUM 10 MG: 10 TABLET, FILM COATED ORAL at 20:04

## 2017-04-14 RX ADMIN — KETOROLAC TROMETHAMINE 30 MG: 30 INJECTION, SOLUTION INTRAMUSCULAR at 11:40

## 2017-04-14 RX ADMIN — INSULIN DETEMIR 55 UNITS: 100 INJECTION, SOLUTION SUBCUTANEOUS at 20:04

## 2017-04-14 RX ADMIN — CLOPIDOGREL BISULFATE 75 MG: 75 TABLET, FILM COATED ORAL at 08:21

## 2017-04-14 RX ADMIN — VALPROATE SODIUM 950 MG: 100 INJECTION, SOLUTION INTRAVENOUS at 10:05

## 2017-04-14 RX ADMIN — ATENOLOL 25 MG: 25 TABLET ORAL at 08:21

## 2017-04-14 RX ADMIN — HEPARIN SODIUM 5000 UNITS: 5000 INJECTION, SOLUTION INTRAVENOUS; SUBCUTANEOUS at 20:05

## 2017-04-14 RX ADMIN — TIZANIDINE 4 MG: 4 TABLET ORAL at 20:06

## 2017-04-14 RX ADMIN — VALPROATE SODIUM 950 MG: 100 INJECTION, SOLUTION INTRAVENOUS at 01:42

## 2017-04-14 RX ADMIN — INSULIN LISPRO 6 UNITS: 100 INJECTION, SOLUTION INTRAVENOUS; SUBCUTANEOUS at 11:43

## 2017-04-14 RX ADMIN — GABAPENTIN 300 MG: 300 CAPSULE ORAL at 20:04

## 2017-04-14 RX ADMIN — INSULIN LISPRO 7 UNITS: 100 INJECTION, SOLUTION INTRAVENOUS; SUBCUTANEOUS at 17:39

## 2017-04-14 RX ADMIN — GABAPENTIN 300 MG: 300 CAPSULE ORAL at 05:46

## 2017-04-14 NOTE — PROGRESS NOTES
Acute Care - Occupational Therapy Treatment Note  [facility]     Patient Name: Denzel Harding  : 1960  MRN: [id number]  Today's Date: 2017  Onset of Illness/Injury or Date of Surgery Date: 17  Date of Referral to OT: 17  Referring Physician: MD Michele      Admit Date: 2017    Visit Dx:     ICD-10-CM ICD-9-CM   1. Weakness generalized R53.1 780.79   2. Intractable episodic headache, unspecified headache type R51 784.0   3. Impaired mobility and ADLs Z74.09 799.89   4. Impaired functional mobility, balance, gait, and endurance Z74.09 V49.89     Patient Active Problem List   Diagnosis   • Coronary artery disease involving native coronary artery of native heart without angina pectoris   • Pre-operative cardiovascular examination   • Lumbar radiculopathy   • Hypercholesterolemia   • Diabetic polyneuropathy associated with type 2 diabetes mellitus   • Periodic headache syndrome, not intractable   • Palpitations   • Chest pain   • Shortness of breath   • Edema   • Hypertension   • GERD (gastroesophageal reflux disease)   • Brachial neuritis   • Cervical radiculopathy   • LOM (loss of memory)   • Weakness generalized   • Anxiety   • Diabetes mellitus   • Lower back pain             Adult Rehabilitation Note       17 1111 17 0911       Rehab Assessment/Intervention    Discipline occupational therapist  -EVER physical therapist  -KM     Document Type therapy note (daily note)  -EVER therapy note (daily note)  -KM     Subjective Information agree to therapy;complains of;pain  -EVER agree to therapy  -KM     Patient Effort, Rehab Treatment good  -EVER good  -KM     Symptoms Noted Comment continued headache pain  -EVER      Precautions/Limitations fall precautions  -EVER      Recorded by [EVER] Yasmin Herbert OT [KM] Faith De, PT     Vital Signs    Pre Systolic BP Rehab 145  -EVER      Pre Treatment Diastolic BP 87  -EVER      Post Systolic BP Rehab 134  -EVER      Post Treatment Diastolic  BP 78  -EVER      Pretreatment Heart Rate (beats/min) 77  -EVER      Posttreatment Heart Rate (beats/min) 74  -EVER      Pre SpO2 (%) 98  -EVER      O2 Delivery Pre Treatment supplemental O2  -EVER      Post SpO2 (%) 96  -EVER      O2 Delivery Post Treatment supplemental O2   2L  -EVER      Pre Patient Position Supine  -EVER      Intra Patient Position Standing  -EVER      Post Patient Position Supine  -EVER      Recorded by [EVER] Yasmin Herbert, OT      Pain Assessment    Pain Assessment 0-10  -EVER John-Fuentes FACES  -KM     John-Fuentes FACES Pain Rating  0  -KM     Pain Score 8  -EVER      Post Pain Score 8  -EVER      Pain Type Acute pain  -EVER      Pain Location Head  -EVER      Pain Intervention(s) Medication (See MAR)   per pt. meds for headache not really working  -EVER      Response to Interventions tolerated  -EVER      Recorded by [EVER] Yasmin Herbert, OT [KM] Faith De, PT     Vision Assessment/Intervention    Visual Impairment WFL with corrective lenses  -EVER      Recorded by [EVER] Yasmin Herbert OT      Cognitive Assessment/Intervention    Current Cognitive/Communication Assessment functional  -EVER      Orientation Status oriented x 4  -EVER oriented x 4  -KM     Follows Commands/Answers Questions 100% of the time;able to follow single-step instructions  -EVER able to follow multi-step instructions;100% of the time  -KM     Personal Safety WNL/WFL  -EVER WNL/WFL  -KM     Personal Safety Interventions fall prevention program maintained;gait belt;nonskid shoes/slippers when out of bed  -EVER fall prevention program maintained  -KM     Recorded by [EVER] Yasmin Herbert, OT [KM] Faith De, PT     Bed Mobility, Assessment/Treatment    Bed Mobility, Assistive Device head of bed elevated  -EVER      Bed Mob, Supine to Sit, [place] conditional independence  -EVER independent  -KM     Bed Mob, Sit to Supine, [place] conditional independence  -EVER independent  -KM     Recorded by [EVER] Yasmin Herbert, OT [KM] Faith De,  PT     Transfer Assessment/Treatment    Transfers, Sit-Stand [place] supervision required  -EVER supervision required  -KM     Transfers, Stand-Sit [place] supervision required  -EVER supervision required  -KM     Toilet Transfer, [place] conditional independence  -EVER independent  -KM     Toilet Transfer, Assistive Device --   grab bars  -EVER      Transfer, Comment  --   Pt assisted to bathroom and toileted/transferred independent  -KM     Recorded by [EVER] Yasmin Herbert, OT [KM] Faith De, PT     Gait Assessment/Treatment    Gait, [place] Level  stand by assist  -KM     Gait, Assistive Device  rolling walker  -KM     Gait, Distance (Feet)  200   ambulated 20 with c.g.assist w/o wx  -KM     Recorded by  [KM] Faith De, PT     Functional Mobility    Functional Mobility- Ind. Level minimum assist (75% patient effort)  -EVER      Functional Mobility- Device other (see comments)   UE support  -EVER      Functional Mobility-Distance (Feet) 40   in room areas  -EVER      Functional Mobility- Comment impaired balance, one unsteady period leaning into wall.  -EVER      Recorded by [EVER] Yasmin Herbert, OT      Toileting Assessment/Training    Toileting Assess/Train, Assistive Device grab bars  -EVER      Toileting Assess/Train, Indepen Level supervision required  -EVER      Toileting Assess/Train, Impairments impaired balance  -EVER      Recorded by [EVER] Yasmin Herbert, OT      Grooming Assessment/Training    Grooming Assess/Train, Position standing  -EVER      Grooming Assess/Train, Indepen Level supervision required  -EVER      Grooming Assess/Train, Impairments impaired balance   limited activity tolerance  -EVER      Recorded by [EVER] Yasmin Herbert, OT      Motor Skills/Interventions    Additional Documentation Balance Skills Training (Group)  -EVER Balance Skills Training (Group)  -KM     Recorded by [EVER] Yasmin Herbert, OT [KM] Faith De, PT     Balance Skills Training    Sitting-Level of  Assistance Independent  -EVER Independent  -KM     Sitting-Balance Support Feet supported  -EVER Feet supported  -KM     Standing-Level of Assistance Distant supervision  - Independent  -KM     Standing-Balance Activities --   grooming sink  -EVER Weight Shift A-P;Weight Shift R-L;Reaching for objects  -KM     Gait Balance-Level of Assistance  Contact guard  -KM     Recorded by [EVER] Yasmin Herbert OT [KM] Faith De, PT     Therapy Exercises    Bilateral Upper Extremity AROM:;15 reps;sitting;elbow flexion/extension;shoulder abduction/adduction;shoulder extension/flexion;shoulder horizontal abd/add;shoulder protraction/retraction;shoulder rolls/shrugs  -EVER      BUE Resistance manual resistance- moderate;other (comment)   biceps and triceps  -EVER      Recorded by [EVER] Yasmin Herbert OT      Positioning and Restraints    Pre-Treatment Position in bed  -EVER in bed  -KM     Post Treatment Position bed  -EVER bed  -KM     In Bed supine;call light within reach;with nsg  -EVER supine;call light within reach;encouraged to call for assist  -KM     Recorded by [EVER] Yasmin Herbert OT [KM] Faith De, PT       User Key  (r) = Recorded By, (t) = Taken By, (c) = Cosigned By    Initials Name Effective Dates    EVER Herbert OT 06/22/15 -     SOL De, PT 06/19/15 -                 OT Goals       04/14/17 1328 04/12/17 1534       Transfer Training OT LTG    Transfer Training OT LTG, Date Established  04/12/17  -     Transfer Training OT LTG, Time to Achieve  by discharge  -     Transfer Training OT LTG, Activity Type  sit to stand/stand to sit;toilet  -     Transfer Training OT LTG, [place] Level  supervision required  -     Transfer Training OT LTG, Assist Device  walker, rolling  -     Transfer Training OT LTG, Outcome goal met  - goal ongoing  -     Strength OT LTG    Strength Goal OT LTG, Date Established  04/12/17  -     Strength Goal OT LTG, Time to Achieve  by discharge   -     Strength Goal OT LTG, Measure to Achieve  Pt will increase BUE to 5/5 to increase independence with ADLs.  -     Strength Goal OT LTG, Outcome goal ongoing  -EVER goal ongoing  -     Toileting OT LTG    Toileting Goal OT LTG, Date Established  04/12/17  -     Toileting Goal OT LTG, Time to Achieve  by discharge  -     Toileting Goal OT LTG, [place] Level  set up required  -     Toileting Goal OT LTG, Outcome goal met  -EVER goal ongoing  -     Functional Mobility OT LTG    Functional Mobility Goal OT LTG, Date Established  04/12/17  -     Functional Mobility Goal OT LTG, Time to Achieve  by discharge  -     Functional Mobility Goal OT LTG, [place] Level  contact guard  -     Functional Mobility Goal OT LTG, Assist Device  rolling walker  -     Functional Mobility Goal OT LTG, Distance to Achieve  to the bathroom  -     Functional Mobility Goal OT LTG, Outcome goal met  -EVER goal ongoing  -       User Key  (r) = Recorded By, (t) = Taken By, (c) = Cosigned By    Initials Name Provider Type    EVER Herbert, OT Occupational Therapist    REBEKA Tubbs, OT Occupational Therapist          Occupational Therapy Education     Title: PT OT SLP Therapies (Active)     Topic: Occupational Therapy (Active)     Point: ADL training (Done)    Description: Instruct learner(s) on proper safety adaptation and remediation techniques during self care or transfers.   Instruct in proper use of assistive devices.    Learning Progress Summary    Learner Readiness Method Response Comment Documented by Status   Patient Acceptance E,D VU UE ROM exer. benefits activity, balance safety  04/14/17 1328 Done    Acceptance E RAÚLNR   04/12/17 1534 Done   Family Acceptance E VUNR   04/12/17 1534 Done               Point: Home exercise program (Done)    Description: Instruct learner(s) on appropriate technique for monitoring, assisting and/or progressing therapeutic exercises/activities.    Learning  Progress Summary    Learner Readiness Method Response Comment Documented by Status   Patient Acceptance E,D VU UE ROM exer. benefits activity, balance safety  04/14/17 1328 Done               Point: Body mechanics (Done)    Description: Instruct learner(s) on proper positioning and spine alignment during self-care, functional mobility activities and/or exercises.    Learning Progress Summary    Learner Readiness Method Response Comment Documented by Status   Patient Acceptance E VU,NR   04/12/17 1534 Done   Family Acceptance E VU,NR   04/12/17 1534 Done                      User Key     Initials Effective Dates Name Provider Type Discipline     06/22/15 -  Yasmin Herbert, OT Occupational Therapist OT     03/14/16 -  Danielle Tubbs, OT Occupational Therapist OT                  OT Recommendation and Plan  Anticipated Discharge Disposition: home with assist, home with outpatient services  Planned Therapy Interventions: ADL retraining, balance training, bed mobility training, home exercise program, strengthening, transfer training  Therapy Frequency: daily  Plan of Care Review  Plan Of Care Reviewed With: patient  Progress: improving  Outcome Summary/Follow up Plan: Pt. with good progress to goals, needs cont. work on strength, endurance building and  balance .        Outcome Measures       04/14/17 0911 04/13/17 0850 04/12/17 1456    How much help from another person do you currently need...    Turning from your back to your side while in flat bed without using bedrails? 4  -KM 4  -KM     Moving from lying on back to sitting on the side of a flat bed without bedrails? 4  -KM 4  -KM     Moving to and from a bed to a chair (including a wheelchair)? 3  -KM 3  -KM     Standing up from a chair using your arms (e.g., wheelchair, bedside chair)? 3  -KM 3  -KM     Climbing 3-5 steps with a railing? 3  -KM 3  -KM     To walk in hospital room? 3  -KM 3  -KM     AM-PAC 6 Clicks Score 20  -KM 20  -KM     How much help from  another is currently needed...    Putting on and taking off regular lower body clothing?   3  -MC    Bathing (including washing, rinsing, and drying)   3  -MC    Toileting (which includes using toilet bed pan or urinal)   3  -MC    Putting on and taking off regular upper body clothing   3  -MC    Taking care of personal grooming (such as brushing teeth)   3  -MC    Eating meals   4  -    Score   19  -    Modified Alexander Scale    Modified Alexander Scale   4 - Moderately severe disability.  Unable to walk without assistance, and unable to attend to own bodily needs without assistance.  -    Functional Assessment    Outcome Measure Options AM-PAC 6 Clicks Basic Mobility (PT)  -KM AM-PAC 6 Clicks Basic Mobility (PT)  -KM AM-PAC 6 Clicks Daily Activity (OT);Modified Alexander  -      User Key  (r) = Recorded By, (t) = Taken By, (c) = Cosigned By    Initials Name Provider Type     Faith De, PT Physical Therapist     Danielle Tubbs, OT Occupational Therapist           Time Calculation:         Time Calculation- OT       04/14/17 1330          Time Calculation- OT    OT Start Time 1111  -EVER      Total Timed Code Minutes- OT 34 minute(s)  -EVER      OT Received On 04/14/17  -EVER      OT Goal Re-Cert Due Date 04/22/17  -EVER        User Key  (r) = Recorded By, (t) = Taken By, (c) = Cosigned By    Initials Name Provider Type    EVER Herbert OT Occupational Therapist           Therapy Charges for Today     Code Description Service Date Service Provider Modifiers Qty    [id number]  OT THERAPEUTIC ACT EA 15 MIN 4/14/2017 Yasmin Herbert OT GO 2          OT G-codes  OT Professional Judgement Used?: Yes  OT Functional Scales Options: AM-PAC 6 Clicks Daily Activity (OT)  Score: 19  Functional Limitation: Self care  Self Care Current Status (): At least 40 percent but less than 60 percent impaired, limited or restricted  Self Care Goal Status (): At least 20 percent but less than 40 percent impaired,  limited or restricted    Yasmin Herbert, OT  4/14/2017

## 2017-04-14 NOTE — PLAN OF CARE
Problem: Patient Care Overview (Adult)  Goal: Plan of Care Review  Outcome: Ongoing (interventions implemented as appropriate)  Goal: Adult Individualization and Mutuality  Outcome: Ongoing (interventions implemented as appropriate)  Goal: Discharge Needs Assessment  Outcome: Ongoing (interventions implemented as appropriate)    Problem: Pain, Acute (Adult)  Goal: Identify Related Risk Factors and Signs and Symptoms  Outcome: Ongoing (interventions implemented as appropriate)  Goal: Acceptable Pain Control/Comfort Level  Outcome: Ongoing (interventions implemented as appropriate)    Problem: Seizure Disorder/Epilepsy (Adult)  Goal: Signs and Symptoms of Listed Potential Problems Will be Absent or Manageable (Seizure Disorder/Epilepsy)  Outcome: Ongoing (interventions implemented as appropriate)

## 2017-04-14 NOTE — PROGRESS NOTES
Continued Stay Note   [facility]     Patient Name: Denzel Harding  MRN: [id number]  Today's Date: 4/14/2017    Admit Date: 4/11/2017          Discharge Plan       04/14/17 1543    Case Management/Social Work Plan    Plan Spoke with Mr. Harding for an updated visit.  His goal is to return to his home with his spouse and 21 year old. He does not know for certain if the  plan might change.    Patient/Family In Agreement With Plan yes              Discharge Codes     None            DONAVON Savage

## 2017-04-14 NOTE — PLAN OF CARE
Problem: Patient Care Overview (Adult)  Goal: Plan of Care Review  Outcome: Ongoing (interventions implemented as appropriate)    04/14/17 1328   Coping/Psychosocial Response Interventions   Plan Of Care Reviewed With patient   Patient Care Overview   Progress improving   Outcome Evaluation   Outcome Summary/Follow up Plan Pt. with good progress to goals, needs cont. work on strength, endurance building and balance .         Problem: Inpatient Occupational Therapy  Goal: Transfer Training Goal 1 LTG- OT  Outcome: Outcome(s) achieved Date Met:  04/14/17 04/12/17 1534 04/14/17 1328   Transfer Training OT LTG   Transfer Training OT LTG, Date Established 04/12/17 --    Transfer Training OT LTG, Time to Achieve by discharge --    Transfer Training OT LTG, Activity Type sit to stand/stand to sit;toilet --    Transfer Training OT LTG, [place] Level supervision required --    Transfer Training OT LTG, Assist Device walker, rolling --    Transfer Training OT LTG, Outcome --  goal met       Goal: Strength Goal LTG- OT  Outcome: Ongoing (interventions implemented as appropriate)    04/14/17 1328   Strength OT LTG   Strength Goal OT LTG, Outcome goal ongoing       Goal: Toileting Goal LTG- OT  Outcome: Outcome(s) achieved Date Met:  04/14/17 04/12/17 1534 04/14/17 1328   Toileting OT LTG   Toileting Goal OT LTG, Date Established 04/12/17 --    Toileting Goal OT LTG, Time to Achieve by discharge --    Toileting Goal OT LTG, Door Level set up required --    Toileting Goal OT LTG, Outcome --  goal met       Goal: Functional Mobility Goal LTG- OT  Outcome: Outcome(s) achieved Date Met:  04/14/17 04/12/17 1534 04/14/17 1328   Functional Mobility OT LTG   Functional Mobility Goal OT LTG, Date Established 04/12/17 --    Functional Mobility Goal OT LTG, Time to Achieve by discharge --    Functional Mobility Goal OT LTG, Door Level contact guard --    Functional Mobility Goal OT LTG, Assist Device rolling  walker --    Functional Mobility Goal OT LTG, Distance to Achieve to the bathroom --    Functional Mobility Goal OT LTG, Outcome --  goal met

## 2017-04-14 NOTE — PROGRESS NOTES
Daily Progress Note  Neurology     LOS: 1 day     Subjective     Interval History:  No acute events overnight. States headache is mildly improved    ROS: positive for headache    Objective     Vital signs in last 24 hours:  Temp:  [97.7 °F (36.5 °C)-98.1 °F (36.7 °C)] 98 °F (36.7 °C)  Heart Rate:  [68-83] 83  Resp:  [16-20] 18  BP: (134-161)/(78-95) 134/78      Physical Exam:   General: Lying in bed with eyes open. Appears relaxed and watching TV. In NAD.     Respiratory: Respirations unlabored   CV: RRR       Neurologic Exam:   Mental status: Awake, alert, Follows commands. Speech fluent   CN: II-XII appear intact   Motor: Strength full (5/5) throughout in BUE and BLE                          Results from last 7 days  Lab Units 04/12/17  0427   WBC 10*3/mm3 4.97   HEMOGLOBIN g/dL 13.0*   HEMATOCRIT % 40.3   PLATELETS 10*3/mm3 113*           Results Review:  Labs reviewed      Assessment/Plan     Principal Problem:    Weakness generalized  Active Problems:    Coronary artery disease involving native coronary artery of native heart without angina pectoris    Hypercholesterolemia    Diabetic polyneuropathy associated with type 2 diabetes mellitus    Periodic headache syndrome, not intractable    Hypertension    Anxiety    Diabetes mellitus    Lower back pain        1. Migraine with aura with status migrainosus = Still present with only mild improvement per his report. On topiramate 100mg daily and Depacon 950mg IV Q8 hours for abortive therapy. Also on Toradol, Compazine and Excedrine as needed. Avoiding DHE given his history of CAD. He received a dose of Solumedrol yesterday. Will switch his Compazine to Q6 hours scheduled and add one-time dose of MgSO4. Continue Topamax and Depacon. Of note, we are quickly approaching the limit of abortive therapy.     2. Seizure disorder = stable. on topiramate and Depacon. Switch back to Depakote ER on discharge        Jina Menjivar MD  04/14/17  3:25 PM

## 2017-04-14 NOTE — PLAN OF CARE
Problem: Patient Care Overview (Adult)  Goal: Plan of Care Review  Outcome: Ongoing (interventions implemented as appropriate)    04/14/17 0945   Patient Care Overview   Progress improving   Outcome Evaluation   Outcome Summary/Follow up Plan Pt improving in all areas, ready to begin weaning of r wx usage, recommend supplemental ambulation with floor staff ad faith. Pt has equipment, st cane and r wx, at home for d/c anticipation         Problem: Inpatient Physical Therapy  Goal: Bed Mobility Goal STG- PT  Outcome: Outcome(s) achieved Date Met:  04/14/17 04/14/17 0945   Bed Mobility PT STG   Bed Mobility PT STG, Outcome goal met       Goal: Transfer Training Goal 1 STG- PT  Outcome: Ongoing (interventions implemented as appropriate)  Goal: Gait Training Goal STG- PT  Outcome: Ongoing (interventions implemented as appropriate)    04/14/17 0945   Gait Training PT STG   Gait Training Goal PT STG, Outcome goal ongoing

## 2017-04-14 NOTE — PROGRESS NOTES
Acute Care - Physical Therapy Treatment Note  [facility]     Patient Name: Denzel Harding  : 1960  MRN: [id number]  Today's Date: 2017  Onset of Illness/Injury or Date of Surgery Date: 17  Date of Referral to PT: 17  Referring Physician: MD Michele    Admit Date: 2017    Visit Dx:    ICD-10-CM ICD-9-CM   1. Weakness generalized R53.1 780.79   2. Intractable episodic headache, unspecified headache type R51 784.0   3. Impaired mobility and ADLs Z74.09 799.89   4. Impaired functional mobility, balance, gait, and endurance Z74.09 V49.89     Patient Active Problem List   Diagnosis   • Coronary artery disease involving native coronary artery of native heart without angina pectoris   • Pre-operative cardiovascular examination   • Lumbar radiculopathy   • Hypercholesterolemia   • Diabetic polyneuropathy associated with type 2 diabetes mellitus   • Periodic headache syndrome, not intractable   • Palpitations   • Chest pain   • Shortness of breath   • Edema   • Hypertension   • GERD (gastroesophageal reflux disease)   • Brachial neuritis   • Cervical radiculopathy   • LOM (loss of memory)   • Weakness generalized   • Anxiety   • Diabetes mellitus   • Lower back pain               Adult Rehabilitation Note       17 0911          Rehab Assessment/Intervention    Discipline physical therapist  -KM      Document Type therapy note (daily note)  -KM      Subjective Information agree to therapy  -KM      Patient Effort, Rehab Treatment good  -KM      Recorded by [KM] Faith De, PT      Pain Assessment    Pain Assessment John-Fuentes FACES  -KM      John-Fuentes FACES Pain Rating 0  -KM      Recorded by [KM] Faith De, PT      Cognitive Assessment/Intervention    Orientation Status oriented x 4  -KM      Follows Commands/Answers Questions able to follow multi-step instructions;100% of the time  -KM      Personal Safety WNL/WFL  -KM      Personal Safety Interventions fall  prevention program maintained  -KM      Recorded by [KM] Faith De PT      Bed Mobility, Assessment/Treatment    Bed Mob, Supine to Sit, [place] independent  -KM      Bed Mob, Sit to Supine, [place] independent  -KM      Recorded by [KM] Faith De PT      Transfer Assessment/Treatment    Transfers, Sit-Stand [place] supervision required  -KM      Transfers, Stand-Sit [place] supervision required  -KM      Toilet Transfer, [place] independent  -KM      Transfer, Comment --   Pt assisted to bathroom and toileted/transferred independent  -KM      Recorded by [KM] Faith De PT      Gait Assessment/Treatment    Gait, [place] Level stand by assist  -KM      Gait, Assistive Device rolling walker  -KM      Gait, Distance (Feet) 200   ambulated 20 with c.g.assist w/o wx  -KM      Recorded by [KM] Faith De PT      Motor Skills/Interventions    Additional Documentation Balance Skills Training (Group)  -KM      Recorded by [KM] Faith De PT      Balance Skills Training    Sitting-Level of Assistance Independent  -KM      Sitting-Balance Support Feet supported  -KM      Standing-Level of Assistance Independent  -KM      Standing-Balance Activities Weight Shift A-P;Weight Shift R-L;Reaching for objects  -KM      Gait Balance-Level of Assistance Contact guard  -KM      Recorded by [KM] Faith De PT      Positioning and Restraints    Pre-Treatment Position in bed  -KM      Post Treatment Position bed  -KM      In Bed supine;call light within reach;encouraged to call for assist  -KM      Recorded by [KM] Faith De PT        User Key  (r) = Recorded By, (t) = Taken By, (c) = Cosigned By    Initials Name Effective Dates    SOL De, NARAYAN 06/19/15 -                 IP PT Goals       04/14/17 0945 04/13/17 0959       Bed Mobility PT STG    Bed Mobility PT STG, Date Established  04/13/17  -KM     Bed  Mobility PT STG, Time to Achieve  2 wks  -KM     Bed Mobility PT STG, Activity Type  all bed mobility  -KM     Bed Mobility PT STG, [place] Level  independent  -KM     Bed Mobility PT STG, Outcome goal met  -KM      Transfer Training PT STG    Transfer Training PT STG, Date Established  04/13/17  -KM     Transfer Training PT STG, Time to Achieve  2 wks  -KM     Transfer Training PT STG, Activity Type  all transfers  -KM     Transfer Training PT STG, [place] Level  independent  -KM     Transfer Training PT STG, Outcome goal ongoing  -KM      Gait Training PT STG    Gait Training Goal PT STG, Date Established  04/13/17  -KM     Gait Training Goal PT STG, Time to Achieve  2 wks  -KM     Gait Training Goal PT STG, [place] Level  independent  -KM     Gait Training Goal PT STG, Assist Device  cane, straight  -KM     Gait Training Goal PT STG, Distance to Achieve  200  -KM     Gait Training Goal PT STG, Outcome goal ongoing  -KM        User Key  (r) = Recorded By, (t) = Taken By, (c) = Cosigned By    Initials Name Provider Type    SOL De, PT Physical Therapist          Physical Therapy Education     Title: PT OT SLP Therapies (Active)     Topic: Physical Therapy (Done)     Point: Mobility training (Done)    Learning Progress Summary    Learner Readiness Method Response Comment Documented by Status   Patient Acceptance E VU  KM 04/14/17 0944 Done    Acceptance E VU  KM 04/13/17 0958 Done               Point: Home exercise program (Done)    Learning Progress Summary    Learner Readiness Method Response Comment Documented by Status   Patient Acceptance E VU  KM 04/14/17 0944 Done    Acceptance E VU  KM 04/13/17 0958 Done               Point: Body mechanics (Done)    Learning Progress Summary    Learner Readiness Method Response Comment Documented by Status   Patient Acceptance E VU  KM 04/14/17 0944 Done    Acceptance E VU  KM 04/13/17 0958 Done               Point: Precautions (Done)     Learning Progress Summary    Learner Readiness Method Response Comment Documented by Status   Patient Acceptance E [facility] 04/14/17 0944 Done    Acceptance E [facility] 04/13/17 0958 Done                      User Key     Initials Effective Dates Name Provider Type Discipline     06/19/15 -  Faith De, PT Physical Therapist PT                    PT Recommendation and Plan  Anticipated Discharge Disposition: home with assist (may benefit from outpt therapy)  PT Frequency: daily  Plan of Care Review  Plan Of Care Reviewed With: patient, spouse  Progress: improving  Outcome Summary/Follow up Plan: Pt improving in all areas, ready to begin weaning of r wx usage, recommend supplemental ambulation with floor staff ad faith. Pt has equipment, st cane and r wx, at home for d/c anticipation          Outcome Measures       04/14/17 0911 04/13/17 0850 04/12/17 1456    How much help from another person do you currently need...    Turning from your back to your side while in flat bed without using bedrails? 4  -KM 4  -KM     Moving from lying on back to sitting on the side of a flat bed without bedrails? 4  -KM 4  -KM     Moving to and from a bed to a chair (including a wheelchair)? 3  -KM 3  -KM     Standing up from a chair using your arms (e.g., wheelchair, bedside chair)? 3  -KM 3  -KM     Climbing 3-5 steps with a railing? 3  -KM 3  -KM     To walk in hospital room? 3  -KM 3  -KM     AM-PAC 6 Clicks Score 20  -KM 20  -KM     How much help from another is currently needed...    Putting on and taking off regular lower body clothing?   3  -MC    Bathing (including washing, rinsing, and drying)   3  -MC    Toileting (which includes using toilet bed pan or urinal)   3  -MC    Putting on and taking off regular upper body clothing   3  -MC    Taking care of personal grooming (such as brushing teeth)   3  -MC    Eating meals   4  -MC    Score   19  -MC    Modified Stevens Scale    Modified Scottie Scale   4 - Moderately severe  disability.  Unable to walk without assistance, and unable to attend to own bodily needs without assistance.  -    Functional Assessment    Outcome Measure Options AM-PAC 6 Clicks Basic Mobility (PT)  -KM AM-PAC 6 Clicks Basic Mobility (PT)  -KM AM-PAC 6 Clicks Daily Activity (OT);Modified [place]  -      User Key  (r) = Recorded By, (t) = Taken By, (c) = Cosigned By    Initials Name Provider Type    SOL De, PT Physical Therapist    REBEKA Tubbs, OT Occupational Therapist           Time Calculation:         PT Charges       04/14/17 0948          Time Calculation    Start Time 0911  -KM      PT Received On 04/14/17  -KM      PT Goal Re-Cert Due Date 04/23/17  -KM      Time Calculation- PT    Total Timed Code Minutes- PT 23 minute(s)  -KM        User Key  (r) = Recorded By, (t) = Taken By, (c) = Cosigned By    Initials Name Provider Type    SOL De, PT Physical Therapist          Therapy Charges for Today     Code Description Service Date Service Provider Modifiers Qty    94712901541 HC PT MOBILITY CURRENT 4/13/2017 Faith De, PT GP, CJ 1    89625140578 HC PT MOBILITY PROJECTED 4/13/2017 Faith De, PT GP,  1    11647415747 HC PT EVAL MOD COMPLEXITY 3 4/13/2017 Faith De, PT GP 1    84156851818 HC GAIT TRAINING EA 15 MIN 4/13/2017 Faith De, PT GP 1    11741504349 HC GAIT TRAINING EA 15 MIN 4/14/2017 Faith De, PT GP 1    46264312545 HC PT THER PROC EA 15 MIN 4/14/2017 Faith De, PT GP 1          PT G-Codes  Outcome Measure Options: AM-PAC 6 Clicks Basic Mobility (PT)  Score: 20  Functional Limitation: Mobility: Walking and moving around  Mobility: Walking and Moving Around Current Status (): At least 20 percent but less than 40 percent impaired, limited or restricted  Mobility: Walking and Moving Around Goal Status (): 0 percent impaired, limited or restricted    Faith De  PT  4/14/2017

## 2017-04-14 NOTE — PROGRESS NOTES
Hospitalist Daily Progress Note    Date of Admission: 4/11/2017   LOS: 1 day   PCP: Ottoniel Toledo MD    Chief Complaint:  Migraine HA, generalized weakness, myalgias    Subjective   History of Present Illness  migraine HA improved, rated at 6/10, no diplopia, some blurry vision but likely due to cataracts  no nuchal rigidity, no fever, no acute back pain although he claims to have chronic back pain.  No nausea but no vomiting.    Review of Systems  Respiratory: no cough, dyspnea  Cardiovascular: no chest pain, palpitations  Abdomen: No abdominal pain.  Neurologic: No focal weakness, although has residual right hand writing issues related to remote CVA.  All other systems reviewed and negative.    Objective   Physical Exam:  I have reviewed the vital signs.  Temp:  [97.7 °F (36.5 °C)-98.1 °F (36.7 °C)] 98 °F (36.7 °C)  Heart Rate:  [68-83] 83  Resp:  [16-20] 18  BP: (121-161)/(77-95) 145/87    Objective  General Appearance: Alert, cooperative, no distress  Head: Normocephalic, atraumatic  Eyes: PERRL EOMI, Sclerae anicteric  Neck: Supple, no mass  Lungs: Clear to auscultation bilaterally, respirations unlabored  Heart: Regular rate and rhythm, S1 and S2 normal, no murmur, rub or gallop  Abdomen: Soft, non-tender, bowel sounds active, nondistended  Extremities: No clubbing, cyanosis, or edema to lower extremities  Pulses: 2+ and symmetric in distal lower extremities  Skin: No rashes   Neurologic: Oriented x3, CN 2-12 intact, speech is clear, no dysarthria, no focal weakness of extremities, although there is gen. Weakness.    Results Review:    I have reviewed the labs, radiology results and diagnostic studies.      Results from last 7 days  Lab Units 04/12/17  0427   WBC 10*3/mm3 4.97   HEMOGLOBIN g/dL 13.0*   PLATELETS 10*3/mm3 113*       Results from last 7 days  Lab Units 04/12/17  0427   SODIUM mmol/L 138   POTASSIUM mmol/L 3.6   TOTAL CO2 mmol/L 25.0   CREATININE mg/dL 0.70   GLUCOSE mg/dL 132*       I have  reviewed the medications.    ---------------------------------------------------------------------------------------------  Assessment/Plan   Assessment & Plan  Assessment/Problem List    Principal Problem:    Weakness generalized  Active Problems:    Coronary artery disease involving native coronary artery of native heart without angina pectoris    Hypercholesterolemia    Diabetic polyneuropathy associated with type 2 diabetes mellitus    Periodic headache syndrome, not intractable    Hypertension    Anxiety    Diabetes mellitus    Lower back pain       Plan  Migraine HA  -MRI Brain no acute findings.  -Neurology consulted, continued topiramate, switched oral depakote to Depacon IV  -Toradol IV prn, compazine and excedrin prn.  -one time dose of solumedrol IV 4/13/17  -avoid DHE given his history of CAD and stent placement.  -improving, awaiting further neuro recs.      Myalgias  -Flu negative, CRP wnl, ESR, TSH wnl, CXR no acute disease.  -Blood Cx NGTD  -PT/OT consults, not a rehab candidate.      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 Shoemaker with neurology.  -is on depakote, depakote levels wnl,  -continue ASA, Plavix, BP meds, statin.      DM2  -A1c 9.2, SSI, increased levemir, FSBS, under control here.      DVT prophylaxis: heparin  Discharge Planning: I expect patient to be discharged home in 1-2 days.    Ron Bautista MD 04/14/17 11:07 AM

## 2017-04-14 NOTE — PLAN OF CARE
Problem: Patient Care Overview (Adult)  Goal: Plan of Care Review  Outcome: Ongoing (interventions implemented as appropriate)    04/14/17 9547   Coping/Psychosocial Response Interventions   Plan Of Care Reviewed With patient   Patient Care Overview   Progress progress toward functional goals as expected       Goal: Adult Individualization and Mutuality  Outcome: Ongoing (interventions implemented as appropriate)    04/14/17 0747   Individualization   Patient Specific Goals pain control for HA

## 2017-04-15 LAB
GLUCOSE BLDC GLUCOMTR-MCNC: 215 MG/DL (ref 70–130)
GLUCOSE BLDC GLUCOMTR-MCNC: 219 MG/DL (ref 70–130)
GLUCOSE BLDC GLUCOMTR-MCNC: 251 MG/DL (ref 70–130)
GLUCOSE BLDC GLUCOMTR-MCNC: 306 MG/DL (ref 70–130)

## 2017-04-15 PROCEDURE — 99226 PR SBSQ OBSERVATION CARE/DAY 35 MINUTES: CPT | Performed by: NURSE PRACTITIONER

## 2017-04-15 PROCEDURE — 25010000002 HEPARIN (PORCINE) PER 1000 UNITS: Performed by: HOSPITALIST

## 2017-04-15 PROCEDURE — 63710000001 INSULIN DETEMIR PER 5 UNITS: Performed by: FAMILY MEDICINE

## 2017-04-15 PROCEDURE — G0378 HOSPITAL OBSERVATION PER HR: HCPCS

## 2017-04-15 PROCEDURE — 96372 THER/PROPH/DIAG INJ SC/IM: CPT

## 2017-04-15 PROCEDURE — 82962 GLUCOSE BLOOD TEST: CPT

## 2017-04-15 PROCEDURE — 25010000002 PROCHLORPERAZINE EDISYLATE PER 10 MG: Performed by: HOSPITALIST

## 2017-04-15 PROCEDURE — 96376 TX/PRO/DX INJ SAME DRUG ADON: CPT

## 2017-04-15 PROCEDURE — 25010000002 PROCHLORPERAZINE EDISYLATE PER 10 MG: Performed by: PSYCHIATRY & NEUROLOGY

## 2017-04-15 PROCEDURE — 63710000001 INSULIN LISPRO (HUMAN) PER 5 UNITS: Performed by: NURSE PRACTITIONER

## 2017-04-15 PROCEDURE — 96366 THER/PROPH/DIAG IV INF ADDON: CPT

## 2017-04-15 PROCEDURE — 25010000002 HYDROMORPHONE PER 4 MG: Performed by: HOSPITALIST

## 2017-04-15 RX ORDER — DOCUSATE SODIUM 100 MG/1
100 CAPSULE, LIQUID FILLED ORAL 2 TIMES DAILY
Status: DISCONTINUED | OUTPATIENT
Start: 2017-04-15 | End: 2017-04-18 | Stop reason: HOSPADM

## 2017-04-15 RX ADMIN — HEPARIN SODIUM 5000 UNITS: 5000 INJECTION, SOLUTION INTRAVENOUS; SUBCUTANEOUS at 16:02

## 2017-04-15 RX ADMIN — ASPIRIN 81 MG: 81 TABLET, CHEWABLE ORAL at 08:45

## 2017-04-15 RX ADMIN — VALPROATE SODIUM 950 MG: 100 INJECTION, SOLUTION INTRAVENOUS at 10:59

## 2017-04-15 RX ADMIN — INSULIN LISPRO 4 UNITS: 100 INJECTION, SOLUTION INTRAVENOUS; SUBCUTANEOUS at 12:19

## 2017-04-15 RX ADMIN — HYDROCODONE BITARTRATE AND ACETAMINOPHEN 1 TABLET: 7.5; 325 TABLET ORAL at 08:44

## 2017-04-15 RX ADMIN — HYDROMORPHONE HYDROCHLORIDE 0.5 MG: 1 INJECTION, SOLUTION INTRAMUSCULAR; INTRAVENOUS; SUBCUTANEOUS at 16:12

## 2017-04-15 RX ADMIN — INSULIN LISPRO 4 UNITS: 100 INJECTION, SOLUTION INTRAVENOUS; SUBCUTANEOUS at 08:44

## 2017-04-15 RX ADMIN — INSULIN LISPRO 4 UNITS: 100 INJECTION, SOLUTION INTRAVENOUS; SUBCUTANEOUS at 18:00

## 2017-04-15 RX ADMIN — GABAPENTIN 300 MG: 300 CAPSULE ORAL at 06:09

## 2017-04-15 RX ADMIN — AMLODIPINE BESYLATE 2.5 MG: 2.5 TABLET ORAL at 08:44

## 2017-04-15 RX ADMIN — INSULIN DETEMIR 55 UNITS: 100 INJECTION, SOLUTION SUBCUTANEOUS at 21:03

## 2017-04-15 RX ADMIN — Medication 10 ML: at 08:49

## 2017-04-15 RX ADMIN — INSULIN LISPRO 5 UNITS: 100 INJECTION, SOLUTION INTRAVENOUS; SUBCUTANEOUS at 12:19

## 2017-04-15 RX ADMIN — INSULIN LISPRO 5 UNITS: 100 INJECTION, SOLUTION INTRAVENOUS; SUBCUTANEOUS at 18:01

## 2017-04-15 RX ADMIN — VALPROATE SODIUM 950 MG: 100 INJECTION, SOLUTION INTRAVENOUS at 02:30

## 2017-04-15 RX ADMIN — GABAPENTIN 300 MG: 300 CAPSULE ORAL at 21:04

## 2017-04-15 RX ADMIN — HEPARIN SODIUM 5000 UNITS: 5000 INJECTION, SOLUTION INTRAVENOUS; SUBCUTANEOUS at 21:04

## 2017-04-15 RX ADMIN — INSULIN LISPRO 7 UNITS: 100 INJECTION, SOLUTION INTRAVENOUS; SUBCUTANEOUS at 21:03

## 2017-04-15 RX ADMIN — ATENOLOL 25 MG: 25 TABLET ORAL at 08:44

## 2017-04-15 RX ADMIN — PROCHLORPERAZINE EDISYLATE 10 MG: 5 INJECTION INTRAMUSCULAR; INTRAVENOUS at 18:02

## 2017-04-15 RX ADMIN — SODIUM CHLORIDE 75 ML/HR: 9 INJECTION, SOLUTION INTRAVENOUS at 11:12

## 2017-04-15 RX ADMIN — GABAPENTIN 300 MG: 300 CAPSULE ORAL at 16:02

## 2017-04-15 RX ADMIN — TOPIRAMATE 100 MG: 100 TABLET, FILM COATED ORAL at 12:51

## 2017-04-15 RX ADMIN — TRAMADOL HYDROCHLORIDE 50 MG: 50 TABLET, COATED ORAL at 08:45

## 2017-04-15 RX ADMIN — VALPROATE SODIUM 950 MG: 100 INJECTION, SOLUTION INTRAVENOUS at 18:09

## 2017-04-15 RX ADMIN — HEPARIN SODIUM 5000 UNITS: 5000 INJECTION, SOLUTION INTRAVENOUS; SUBCUTANEOUS at 06:09

## 2017-04-15 RX ADMIN — ATORVASTATIN CALCIUM 40 MG: 40 TABLET, FILM COATED ORAL at 21:03

## 2017-04-15 RX ADMIN — CLOPIDOGREL BISULFATE 75 MG: 75 TABLET, FILM COATED ORAL at 08:44

## 2017-04-15 RX ADMIN — DOCUSATE SODIUM 100 MG: 100 CAPSULE, LIQUID FILLED ORAL at 17:59

## 2017-04-15 RX ADMIN — LISINOPRIL 40 MG: 20 TABLET ORAL at 08:44

## 2017-04-15 RX ADMIN — PROCHLORPERAZINE EDISYLATE 10 MG: 5 INJECTION INTRAMUSCULAR; INTRAVENOUS at 04:41

## 2017-04-15 RX ADMIN — PROCHLORPERAZINE EDISYLATE 10 MG: 5 INJECTION INTRAMUSCULAR; INTRAVENOUS at 12:52

## 2017-04-15 RX ADMIN — MONTELUKAST SODIUM 10 MG: 10 TABLET, FILM COATED ORAL at 21:03

## 2017-04-15 NOTE — PROGRESS NOTES
Neurology Progress Note      Patient: Denzel Harding      Subjective:  Headache pain is 7/10 (down from 9/10)  Events: none    The relevant past medical, social, surgical, and family history and ROS were reviewed.     Vital Signs   Vitals:    04/15/17 0740 04/15/17 0844 04/15/17 1126 04/15/17 1643   BP: 119/74  143/84 113/63   BP Location: Right arm  Right arm Right arm   Patient Position: Lying  Lying Lying   Pulse: 58 61 66 60   Resp: 16  16 16   Temp: 97.9 °F (36.6 °C)  97.6 °F (36.4 °C) 98.7 °F (37.1 °C)   TempSrc: Oral  Oral Oral   SpO2: 95%  93% 94%   Weight:       Height:         Temp:  [97.6 °F (36.4 °C)-98.7 °F (37.1 °C)] 98.7 °F (37.1 °C)  Physical Exam:   General: mild distress              Neuro: awake, a+ox3, fluent, CN intact, GUNN    Results Review:    Results from last 7 days  Lab Units 04/12/17 0427   WBC 10*3/mm3 4.97   HEMOGLOBIN g/dL 13.0*   HEMATOCRIT % 40.3   PLATELETS 10*3/mm3 113*       Results from last 7 days  Lab Units 04/12/17  0427 04/11/17  1100   SODIUM mmol/L 138 140  140   POTASSIUM mmol/L 3.6 3.7  3.5   CHLORIDE mmol/L 109 109  106   TOTAL CO2 mmol/L 25.0 27.0  28.0   BUN mg/dL 15 13  14   CREATININE mg/dL 0.70 0.80  0.80   CALCIUM mg/dL 9.2 10.0  10.0   BILIRUBIN mg/dL  --  0.7  0.7   ALK PHOS U/L  --  43  44   ALT (SGPT) U/L  --  46*  48*   AST (SGOT) U/L  --  33  35*   GLUCOSE mg/dL 132* 190*  181*     Results for orders placed or performed during the hospital encounter of 04/11/17   MRI Brain Without Contrast    Narrative    EXAMINATION: MRI BRAIN WO CONTRAST- 04/11/2017     INDICATION: weakness         TECHNIQUE: Images of the brain are displayed in the sagittal and axial  projections.     COMPARISON: 02/03/2017     FINDINGS: There is mucosal thickening in the right maxillary sinus.  There is no intracranial mass. There is no hemorrhage, midline shift or  extra-axial fluid collection. There are periventricular white matter  changes typical of aging.  There is no  cerebellopontine angle mass.  Pituitary gland is normal. There is no acute infarct.       Impression    There are no acute findings. There has been no interval  change since the previous examination of 02/03/2017.       D:  04/11/2017  E:  04/11/2017         This report was finalized on 4/11/2017 3:10 PM by Dr. South Gonzalez MD.      Results for orders placed or performed during the hospital encounter of 02/03/17   CT Head Without Contrast    Narrative    EXAM:    CT Head Without Intravenous Contrast.    CLINICAL HISTORY:    56 years old, male; Signs and symptoms; Dizziness and numbness / parasthesia   and speech disturbance; Left; Slurred speech;    TECHNIQUE:    Axial computed tomography images of the head/brain without intravenous   contrast.  This CT exam was performed using one or more of the following dose   reduction techniques:  automated exposure control, adjustment of the mA and/or   kV according to patient size, and/or use of iterative reconstruction technique.    COMPARISON:    CT HEAD WO CONTRAST 4/22/2016 1:05:00 PM    FINDINGS:    Brain:  Mild diffuse cortical volume loss.  Tiny remote lacunar infarct in   the right internal capsule.  No acute intracranial hemorrhage.  No midline   shift.    Ventricles:  Unremarkable.    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  No acute fracture.    Soft tissues:  Unremarkable.    Sinuses:  Opacification of the left frontal sinus.  Prominent mucosal   thickening in the right maxillary sinus.  These findings appear essentially   unchanged since prior exam.    Mastoid air cells:  No mastoid effusion.      Impression    1.  No acute intracranial abnormality is identified.  MRI with diffusion   weighted imaging would be more sensitive for detection of an acute infarct, if   clinically indicated.  2.  Chronic sinusitis.    THIS DOCUMENT HAS BEEN ELECTRONICALLY SIGNED BY HORTENSIA CUNHA MD   Results for orders placed or performed during the hospital  encounter of 09/14/14   MRI BRAIN W WO CONTRAST    Narrative       MR DATA SETS OF THE BRAIN WITHOUT AND WITH CONTRAST, 09/14/2014      HISTORY: Altered mental status, weakness, difficulty walking and facial  droop.     TECHNICAL: Creatinine 0.8, .  20 mL of MultiHance was  administered for contrast enhancement.     FINDINGS:  1. The enhanced data sets of the brain are negative. There is no  evidence of intra-axial enhancing lesion, primary tumor or metastatic  disease.   2. The hippocampal data sets are normal. The hippocampal signal and size  are normal and symmetrical. The basilar artery and basilar summit are  unremarkable. The cerebellopontine angles are within normal limits.  3. The diffusion-weighted sequence is normal. There is no restricted  diffusion or ischemic insult and there is no infarct in evolution.  4. T1 data sets are negative for hemorrhage or extracerebral collection.  The FLAIR data sets demonstrate only scattered sparse areas of  microangiopathy, especially in the leftward mid corona radiata. Advanced  white matter disease is not identified. The flow voids are within normal  limits.  5. The patient has substantial inflammatory ethmoid sinusitis and there  is mild maxillary sinusitis without air-fluid level.  6. Lastly, there is crowding of the foramen magnum secondary to  tonsillar ectopia.     IMPRESSION-  1. Moderate grade sinusitis without air-fluid level.  2. Sparse microangiopathy in the white matter, especially the left  corona radiata.  3. Tonsillar ectopia is noted with moderate crowding of the foramen  magnum.  4. Otherwise, there is no enhancing lesion, hemorrhage, edema, mass,  restricted diffusion or acute ischemic insult.  5. Therefore, acute intracranial abnormalities are not identified.     DICTATED:     09/14/2014  EDITED:          09/14/2014           Reading Radiologist- JOSEFINA KILGORE       Releasing Radiologist- JOSEFINA KILGORE       Released Date Time-  09/14/14 1440       Brendan CAMILO   ------------------------------------------------------------------------------   Results for orders placed or performed during the hospital encounter of 09/11/14   EEG    Narrative    Ordered by an unspecified provider.       Assessment:    1. Status Migrainosus    Plan:     -continue VPA, Topamax, antiemetics, Tizanidine, prn analgesics  -limited due to comorbidities (no DHE 2/2 CAD, no prolonged steroid course due to DM)  -reviewed dx and tx plan                  Peter Saldivar MD  04/15/17  5:08 PM

## 2017-04-15 NOTE — PROGRESS NOTES
"Hospitalist Daily Progress Note    Date of Admission: 4/11/2017   LOS: 1 day   PCP: Ottoniel Toledo MD    Chief Complaint: hospital follow up    Subjective   History of Present Illness  States HA is unchanged  \"Feel like the top of my head is going to pop off\"  Some relief with dilaudid    Review of Systems   Constitutional: Negative for fever.   HENT:        Minimal phonophobia   Eyes: Positive for photophobia.   Respiratory: Negative for cough, chest tightness and shortness of breath.    Cardiovascular: Negative for chest pain.   Gastrointestinal: Positive for constipation and nausea. Negative for diarrhea and vomiting.   Genitourinary: Negative for dysuria.   Musculoskeletal: Positive for back pain.   Neurological: Positive for headaches.     Objective   Physical Exam:  I have reviewed the vital signs.  Objective:  Vital signs: (most recent): Blood pressure 119/74, pulse 61, temperature 97.9 °F (36.6 °C), temperature source Oral, resp. rate 16, height 70\" (177.8 cm), weight 260 lb (118 kg), SpO2 95 %.  No fever.            General Appearance:    Alert, cooperative, no distress  Head:    Normocephalic, atraumatic  Eyes:    Sclerae anicteric, PERRL  Neck:   Supple, no mass  Lungs: Clear to auscultation bilaterally, respirations unlabored  Heart: Regular rate and rhythm, S1 and S2 normal, no murmur, rub or gallop  Abdomen:  Soft, non-tender, bowel sounds active  Extremities: No clubbing, cyanosis, or edema to lower extremities  Pulses:  2+ and symmetric in distal lower extremities  Skin: No rashes   Neurologic: Oriented x3, Normal strength to extremities    Results Review:        Results from last 7 days  Lab Units 04/12/17  0427   WBC 10*3/mm3 4.97   HEMOGLOBIN g/dL 13.0*   PLATELETS 10*3/mm3 113*       Results from last 7 days  Lab Units 04/12/17  0427   SODIUM mmol/L 138   POTASSIUM mmol/L 3.6   TOTAL CO2 mmol/L 25.0   CREATININE mg/dL 0.70   GLUCOSE mg/dL 132*     Component      Latest Ref Rng & Units 4/14/2017 " 4/14/2017 4/14/2017 4/15/2017          11:28 AM  5:11 PM  8:58 PM    Glucose      70 - 130 mg/dL 258 (H) 306 (H) 307 (H) 219 (H)     No new labs 4.15.17    I have reviewed the medications.    ---------------------------------------------------------------------------------------------  Assessment/Plan   Assessment & Plan  Assessment/Problem List    Principal Problem:    Weakness generalized  Active Problems:    Migraine with aura and with status migrainosus    Seizure disorder    Coronary artery disease involving native coronary artery of native heart without angina pectoris    Hypercholesterolemia    Diabetic polyneuropathy associated with type 2 diabetes mellitus    Hypertension    Anxiety    Diabetes mellitus    Lower back pain       Plan    Migraine HA  -MRI Brain no acute findings.  -Neurology consulted, continued topiramate, switched oral depakote to Depacon IV  -Toradol IV prn, compazine and excedrin prn, dilaudid prn  -one time dose of solumedrol IV 4/13/17  -avoid DHE given his history of CAD and stent placement.  -awaiting further neuro recs.  -      Myalgias  -Flu negative, CRP wnl, ESR, TSH wnl, CXR no acute disease.  -Blood Cx NGTD  -PT/OT consults, not a rehab candidate.      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 Shoemaker with neurology.  -is on depakote, depakote levels wnl,  -continue ASA, Plavix, BP meds, statin.      DM2  -A1c 9.2, SSI, increased levemir, FSBS elevated  -add bolus insulin        DVT prophylaxis:  heparin SQ  Discharge Planning: I expect patient to be discharged to home tomorrow    Sandy Longoria, APRN 04/15/17 8:46 AM

## 2017-04-16 LAB
BACTERIA SPEC AEROBE CULT: NORMAL
BACTERIA SPEC AEROBE CULT: NORMAL
GLUCOSE BLDC GLUCOMTR-MCNC: 110 MG/DL (ref 70–130)
GLUCOSE BLDC GLUCOMTR-MCNC: 140 MG/DL (ref 70–130)
GLUCOSE BLDC GLUCOMTR-MCNC: 149 MG/DL (ref 70–130)
GLUCOSE BLDC GLUCOMTR-MCNC: 154 MG/DL (ref 70–130)
GLUCOSE BLDC GLUCOMTR-MCNC: 158 MG/DL (ref 70–130)

## 2017-04-16 PROCEDURE — 25010000002 PROCHLORPERAZINE EDISYLATE PER 10 MG: Performed by: HOSPITALIST

## 2017-04-16 PROCEDURE — 82962 GLUCOSE BLOOD TEST: CPT

## 2017-04-16 PROCEDURE — 99225 PR SBSQ OBSERVATION CARE/DAY 25 MINUTES: CPT | Performed by: NURSE PRACTITIONER

## 2017-04-16 PROCEDURE — 96372 THER/PROPH/DIAG INJ SC/IM: CPT

## 2017-04-16 PROCEDURE — 63710000001 PROCHLORPERAZINE MALEATE PER 10 MG: Performed by: NURSE PRACTITIONER

## 2017-04-16 PROCEDURE — 63710000001 INSULIN LISPRO (HUMAN) PER 5 UNITS: Performed by: NURSE PRACTITIONER

## 2017-04-16 PROCEDURE — 25010000002 HEPARIN (PORCINE) PER 1000 UNITS: Performed by: HOSPITALIST

## 2017-04-16 PROCEDURE — 96376 TX/PRO/DX INJ SAME DRUG ADON: CPT

## 2017-04-16 PROCEDURE — G0378 HOSPITAL OBSERVATION PER HR: HCPCS

## 2017-04-16 RX ORDER — PROCHLORPERAZINE MALEATE 10 MG
10 TABLET ORAL EVERY 6 HOURS SCHEDULED
Status: DISCONTINUED | OUTPATIENT
Start: 2017-04-16 | End: 2017-04-18 | Stop reason: HOSPADM

## 2017-04-16 RX ORDER — DIVALPROEX SODIUM 500 MG/1
1500 TABLET, EXTENDED RELEASE ORAL DAILY
Status: DISCONTINUED | OUTPATIENT
Start: 2017-04-16 | End: 2017-04-18 | Stop reason: HOSPADM

## 2017-04-16 RX ORDER — SENNA AND DOCUSATE SODIUM 50; 8.6 MG/1; MG/1
2 TABLET, FILM COATED ORAL 2 TIMES DAILY PRN
Status: DISCONTINUED | OUTPATIENT
Start: 2017-04-16 | End: 2017-04-18 | Stop reason: HOSPADM

## 2017-04-16 RX ADMIN — INSULIN LISPRO 5 UNITS: 100 INJECTION, SOLUTION INTRAVENOUS; SUBCUTANEOUS at 12:36

## 2017-04-16 RX ADMIN — MONTELUKAST SODIUM 10 MG: 10 TABLET, FILM COATED ORAL at 20:11

## 2017-04-16 RX ADMIN — INSULIN LISPRO 2 UNITS: 100 INJECTION, SOLUTION INTRAVENOUS; SUBCUTANEOUS at 20:17

## 2017-04-16 RX ADMIN — INSULIN LISPRO 5 UNITS: 100 INJECTION, SOLUTION INTRAVENOUS; SUBCUTANEOUS at 08:24

## 2017-04-16 RX ADMIN — HEPARIN SODIUM 5000 UNITS: 5000 INJECTION, SOLUTION INTRAVENOUS; SUBCUTANEOUS at 14:51

## 2017-04-16 RX ADMIN — GABAPENTIN 300 MG: 300 CAPSULE ORAL at 20:12

## 2017-04-16 RX ADMIN — GABAPENTIN 300 MG: 300 CAPSULE ORAL at 14:52

## 2017-04-16 RX ADMIN — ACETAMINOPHEN, ASPIRIN AND CAFFEINE 2 TABLET: 250; 250; 65 TABLET, FILM COATED ORAL at 20:14

## 2017-04-16 RX ADMIN — PROCHLORPERAZINE MALEATE 10 MG: 10 TABLET, FILM COATED ORAL at 22:52

## 2017-04-16 RX ADMIN — LISINOPRIL 40 MG: 20 TABLET ORAL at 08:20

## 2017-04-16 RX ADMIN — AMLODIPINE BESYLATE 2.5 MG: 2.5 TABLET ORAL at 08:21

## 2017-04-16 RX ADMIN — HEPARIN SODIUM 5000 UNITS: 5000 INJECTION, SOLUTION INTRAVENOUS; SUBCUTANEOUS at 20:12

## 2017-04-16 RX ADMIN — ATENOLOL 25 MG: 25 TABLET ORAL at 08:20

## 2017-04-16 RX ADMIN — DOCUSATE SODIUM 100 MG: 100 CAPSULE, LIQUID FILLED ORAL at 08:20

## 2017-04-16 RX ADMIN — PROCHLORPERAZINE EDISYLATE 10 MG: 5 INJECTION INTRAMUSCULAR; INTRAVENOUS at 00:57

## 2017-04-16 RX ADMIN — CLOPIDOGREL BISULFATE 75 MG: 75 TABLET, FILM COATED ORAL at 08:21

## 2017-04-16 RX ADMIN — DIVALPROEX SODIUM 1500 MG: 500 TABLET, FILM COATED, EXTENDED RELEASE ORAL at 20:09

## 2017-04-16 RX ADMIN — TOPIRAMATE 100 MG: 100 TABLET, FILM COATED ORAL at 10:59

## 2017-04-16 RX ADMIN — ATORVASTATIN CALCIUM 40 MG: 40 TABLET, FILM COATED ORAL at 20:12

## 2017-04-16 RX ADMIN — INSULIN LISPRO 2 UNITS: 100 INJECTION, SOLUTION INTRAVENOUS; SUBCUTANEOUS at 12:35

## 2017-04-16 RX ADMIN — PROCHLORPERAZINE EDISYLATE 10 MG: 5 INJECTION INTRAMUSCULAR; INTRAVENOUS at 05:13

## 2017-04-16 RX ADMIN — ASPIRIN 81 MG: 81 TABLET, CHEWABLE ORAL at 08:21

## 2017-04-16 RX ADMIN — HEPARIN SODIUM 5000 UNITS: 5000 INJECTION, SOLUTION INTRAVENOUS; SUBCUTANEOUS at 05:13

## 2017-04-16 RX ADMIN — VALPROATE SODIUM 950 MG: 100 INJECTION, SOLUTION INTRAVENOUS at 02:22

## 2017-04-16 RX ADMIN — Medication 2 TABLET: at 14:51

## 2017-04-16 RX ADMIN — GABAPENTIN 300 MG: 300 CAPSULE ORAL at 05:13

## 2017-04-16 NOTE — PROGRESS NOTES
"Hospitalist Daily Progress Note    Date of Admission: 4/11/2017   LOS: 1 day   PCP: Ottoniel Toledo MD    Chief Complaint:  Migraine HA, weakness    Subjective   Weakness - Generalized   Associated symptoms include headaches and nausea. Pertinent negatives include no chest pain, coughing, fever or vomiting.     States WILLARD is still at 8/10 and feels he'd be right back if he was discharged.  +photophobia, but HA ipor    Review of Systems   Constitutional: Negative for fever.   HENT:        Minimal phonophobia   Eyes: Positive for photophobia.   Respiratory: Negative for cough, chest tightness and shortness of breath.    Cardiovascular: Negative for chest pain.   Gastrointestinal: Positive for constipation and nausea. Negative for diarrhea and vomiting.   Genitourinary: Negative for dysuria.   Musculoskeletal: Positive for back pain.   Neurological: Positive for headaches.     Objective   Physical Exam:  I have reviewed the vital signs.  Objective:  Vital signs: (most recent): Blood pressure 142/80, pulse 68, temperature 98.3 °F (36.8 °C), temperature source Oral, resp. rate 19, height 70\" (177.8 cm), weight 260 lb (118 kg), SpO2 94 %.  No fever.          General: Alert, well developed, well nourished male in NAD  Head: Normocephalic, atraumatic  Eyes: Sclerae anicteric, EOMI, photophobic  Neck: Supple, non tender trachea midline without nuchal rigidity or JVD  Lungs: Non-labored with symmetrical chest expansion.  CTAB  Heart: Regular rate and rhythm, S1 and S2 normal, no murmur, rub or gallop  Abdomen:  Morbidly obese, soft, non-tender, non-distended bowel sounds active  Extremities: FROM, no E/C/C, +2 DP pulses  Skin: Pink, warm and dry. No rashes   Neurologic: Oriented to person, place, time and situation, speech is clear, follows all commands, recent and remote intact  Psych:  Cooperative, flat affect    Results Review:        Results from last 7 days  Lab Units 04/12/17  0427   WBC 10*3/mm3 4.97   HEMOGLOBIN g/dL " 13.0*   PLATELETS 10*3/mm3 113*       Results from last 7 days  Lab Units 04/12/17  0427   SODIUM mmol/L 138   POTASSIUM mmol/L 3.6   TOTAL CO2 mmol/L 25.0   CREATININE mg/dL 0.70   GLUCOSE mg/dL 132*     Component      Latest Ref Rng & Units 4/14/2017 4/14/2017 4/14/2017 4/15/2017          11:28 AM  5:11 PM  8:58 PM    Glucose      70 - 130 mg/dL 258 (H) 306 (H) 307 (H) 219 (H)     No new labs 4.15.17    I have reviewed the medications.  ---------------------------------------------------------------------------------------------  Assessment/Plan   Assessment & Plan  Assessment/Problem List    Principal Problem:    Weakness generalized  Active Problems:    Coronary artery disease involving native coronary artery of native heart without angina pectoris    Hypercholesterolemia    Diabetic polyneuropathy associated with type 2 diabetes mellitus    Migraine with aura and with status migrainosus    Hypertension    Seizure disorder    Anxiety    Diabetes mellitus    Lower back pain    Plan  Migraine HA  -MRI Brain no acute findings.  -Neurology consulted, continued topiramate, switched oral depakote to Depacon IV  -Toradol IV prn, compazine and excedrin prn, dilaudid prn  -one time dose of solumedrol IV 4/13/17  -avoid DHE given his history of CAD and stent placement.  -awaiting further neuro recs  -States HA 8/10      Myalgias  -Flu negative, CRP wnl, ESR, TSH wnl, CXR no acute disease.  -Blood Cx NGTD  -PT/OT consults, not a rehab candidate.      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 Shoemaker with neurology.  -is on depakote, depakote levels wnl,  -continue ASA, Plavix, BP meds, statin.      DM2  -A1c 9.2, SSI, increased levemir, FSBS elevated  -Increase preprandial insulin to 8u QAC    DVT prophylaxis:  heparin SQ  Discharge Planning: I expect patient to be discharged to home tomorrow    ANTONIO Dickerson 04/16/17 1:54 PM

## 2017-04-16 NOTE — PLAN OF CARE
Problem: Patient Care Overview (Adult)  Goal: Plan of Care Review  Outcome: Ongoing (interventions implemented as appropriate)    04/16/17 1748   Coping/Psychosocial Response Interventions   Plan Of Care Reviewed With patient;spouse;daughter   Patient Care Overview   Progress progress toward functional goals as expected   Outcome Evaluation   Outcome Summary/Follow up Plan no PRN meds required, VS stable, plan discharge for 4/17/2017       Goal: Adult Individualization and Mutuality  Outcome: Ongoing (interventions implemented as appropriate)    04/16/17 1748   Individualization   Patient Specific Goals pain control   Patient Specific Interventions pt remains painfree on scheduled meds only, no PRN meds required       Goal: Discharge Needs Assessment    04/16/17 1748   Discharge Needs Assessment   Concerns To Be Addressed no discharge needs identified   Readmission Within The Last 30 Days no previous admission in last 30 days   Equipment Needed After Discharge none   Discharge Disposition home or self-care   Current Health   Anticipated Changes Related to Illness none   Living Environment   Transportation Available family or friend will provide;car         Problem: Pain, Acute (Adult)  Intervention: Monitor/Manage Analgesia    04/16/17 0750 04/16/17 1748   Manage Acute Burn Pain   Bowel Intervention ambulation promoted;adequate fluid intake promoted;privacy promoted;other (see comments)  (Rx as ordered - see MAR) --    Pain Management Interventions --  pain care plan reviewed with patient/caregiver   Safety Interventions   Medication Review/Management medications reviewed;high risk medications identified --        Intervention: Support/Optimize Psychosocial Response to Acute Pain    04/16/17 0750   Coping Strategies   Trust Relationship/Rapport care explained;choices provided;emotional support provided;empathic listening provided;questions answered;questions encouraged;reassurance provided;thoughts/feelings  acknowledged   Supportive Measures active listening utilized;goal setting facilitated;positive reinforcement provided;self-care encouraged;self-reflection promoted;self-responsibility promoted;verbalization of feelings encouraged         Goal: Identify Related Risk Factors and Signs and Symptoms  Outcome: Outcome(s) achieved Date Met:  04/16/17 04/16/17 1748   Pain, Acute   Related Risk Factors (Acute Pain) patient perception       Goal: Acceptable Pain Control/Comfort Level    04/16/17 1748   Pain, Acute (Adult)   Acceptable Pain Control/Comfort Level achieves outcome         Problem: Seizure Disorder/Epilepsy (Adult)  Intervention: Support/Optimize Psychosocial Response to Condition    04/16/17 0750 04/16/17 1748   Coping Strategies   Family/Support System Care --  caregiver stress acknowledged;presence promoted;self-care encouraged;support provided   Supportive Measures active listening utilized;goal setting facilitated;positive reinforcement provided;self-care encouraged;self-reflection promoted;self-responsibility promoted;verbalization of feelings encouraged --    Coping/Psychosocial Interventions   Environmental Support --  calm environment promoted;caregiver consistency promoted;comfort object encouraged;environmental consistency promoted;personal routine supported;rest periods encouraged;rooming-in facilitated       Intervention: Promote Airway Safety/Patency    04/16/17 1748   Positioning   Head Of Bed (HOB) Position HOB elevated   Respiratory Interventions   Airway/Ventilation Management pulmonary hygiene promoted       Intervention: Prevent Seizure-related Injury    04/16/17 1748   Safety Interventions   Seizure Precautions clutter-free environment maintained;emergency equipment at bedside;soft boundaries provided         Goal: Signs and Symptoms of Listed Potential Problems Will be Absent or Manageable (Seizure Disorder/Epilepsy)    04/16/17 1748   Seizure Disorder/Epilepsy   Problems Assessed  (Seizure Disorder/Epilepsy) all   Problems Present (Seizure Disorder/Epilepsy) none

## 2017-04-16 NOTE — PLAN OF CARE
Problem: Patient Care Overview (Adult)  Goal: Plan of Care Review  Outcome: Ongoing (interventions implemented as appropriate)    04/16/17 3576   Outcome Evaluation   Outcome Summary/Follow up Plan Tolerating current plan of care, did not require any PRN medications, slept most of the night comfortably. Plan for D/C home today.         Problem: Pain, Acute (Adult)  Goal: Identify Related Risk Factors and Signs and Symptoms  Outcome: Ongoing (interventions implemented as appropriate)    Problem: Seizure Disorder/Epilepsy (Adult)  Goal: Signs and Symptoms of Listed Potential Problems Will be Absent or Manageable (Seizure Disorder/Epilepsy)  Outcome: Ongoing (interventions implemented as appropriate)

## 2017-04-16 NOTE — PROGRESS NOTES
"Neurology Progress Note        Patient: Denzel Harding        Subjective: Headache pain is 8/10   Events: none     The relevant past medical, social, surgical, and family history and ROS were reviewed.      Vital Signs   /80  Pulse 68  Temp 98.3 °F (36.8 °C) (Oral)   Resp 19  Ht 70\" (177.8 cm)  Wt 260 lb (118 kg)  SpO2 94%  BMI 37.31 kg/m2    Physical Exam:  General: mild distress  Neuro: awake, a+ox3, fluent, CN intact, GUNN     Results Review:     Results from last 7 days  Lab Units 04/12/17  0427   WBC 10*3/mm3 4.97   HEMOGLOBIN g/dL 13.0*   HEMATOCRIT % 40.3   PLATELETS 10*3/mm3 113*         Results from last 7 days  Lab Units 04/12/17  0427 04/11/17  1100   SODIUM mmol/L 138 140  140   POTASSIUM mmol/L 3.6 3.7  3.5   CHLORIDE mmol/L 109 109  106   TOTAL CO2 mmol/L 25.0 27.0  28.0   BUN mg/dL 15 13  14   CREATININE mg/dL 0.70 0.80  0.80   CALCIUM mg/dL 9.2 10.0  10.0   BILIRUBIN mg/dL --  0.7  0.7   ALK PHOS U/L --  43  44   ALT (SGPT) U/L --  46*  48*   AST (SGOT) U/L --  33  35*   GLUCOSE mg/dL 132* 190*  181*          Results for orders placed or performed during the hospital encounter of 04/11/17   MRI Brain Without Contrast     Narrative     EXAMINATION: MRI BRAIN WO CONTRAST- 04/11/2017      INDICATION: weakness       TECHNIQUE: Images of the brain are displayed in the sagittal and axial  projections.      COMPARISON: 02/03/2017      FINDINGS: There is mucosal thickening in the right maxillary sinus.  There is no intracranial mass. There is no hemorrhage, midline shift or  extra-axial fluid collection. There are periventricular white matter  changes typical of aging. There is no cerebellopontine angle mass.  Pituitary gland is normal. There is no acute infarct.         Impression     There are no acute findings. There has been no interval  change since the previous examination of 02/03/2017.       D: 04/11/2017  E: 04/11/2017          This report was finalized on 4/11/2017 3:10 PM " by Dr. South Gonzalez MD.       Results for orders placed or performed during the hospital encounter of 02/03/17   CT Head Without Contrast     Narrative     EXAM:  CT Head Without Intravenous Contrast.     CLINICAL HISTORY:  56 years old, male; Signs and symptoms; Dizziness and numbness / parasthesia   and speech disturbance; Left; Slurred speech;     TECHNIQUE:  Axial computed tomography images of the head/brain without intravenous   contrast. This CT exam was performed using one or more of the following dose   reduction techniques: automated exposure control, adjustment of the mA and/or   kV according to patient size, and/or use of iterative reconstruction technique.     COMPARISON:  CT HEAD WO CONTRAST 4/22/2016 1:05:00 PM     FINDINGS:  Brain: Mild diffuse cortical volume loss. Tiny remote lacunar infarct in   the right internal capsule. No acute intracranial hemorrhage. No midline   shift.  Ventricles: Unremarkable.  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 No acute fracture.  Soft tissues: Unremarkable.  Sinuses: Opacification of the left frontal sinus. Prominent mucosal   thickening in the right maxillary sinus. These findings appear essentially   unchanged since prior exam.  Mastoid air cells: No mastoid effusion.     Impression     1. No acute intracranial abnormality is identified. MRI with diffusion   weighted imaging would be more sensitive for detection of an acute infarct, if   clinically indicated.  2. Chronic sinusitis.     THIS DOCUMENT HAS BEEN ELECTRONICALLY SIGNED BY HORTENSIA CUNHA MD   Results for orders placed or performed during the hospital encounter of 09/14/14   MRI BRAIN W WO CONTRAST     Narrative         MR DATA SETS OF THE BRAIN WITHOUT AND WITH CONTRAST, 09/14/2014       HISTORY: Altered mental status, weakness, difficulty walking and facial  droop.      TECHNICAL: Creatinine 0.8, . 20 mL of MultiHance was  administered for contrast  enhancement.      FINDINGS:  1. The enhanced data sets of the brain are negative. There is no  evidence of intra-axial enhancing lesion, primary tumor or metastatic  disease.   2. The hippocampal data sets are normal. The hippocampal signal and size  are normal and symmetrical. The basilar artery and basilar summit are  unremarkable. The cerebellopontine angles are within normal limits.  3. The diffusion-weighted sequence is normal. There is no restricted  diffusion or ischemic insult and there is no infarct in evolution.  4. T1 data sets are negative for hemorrhage or extracerebral collection.  The FLAIR data sets demonstrate only scattered sparse areas of  microangiopathy, especially in the leftward mid corona radiata. Advanced  white matter disease is not identified. The flow voids are within normal  limits.  5. The patient has substantial inflammatory ethmoid sinusitis and there  is mild maxillary sinusitis without air-fluid level.  6. Lastly, there is crowding of the foramen magnum secondary to  tonsillar ectopia.      IMPRESSION-  1. Moderate grade sinusitis without air-fluid level.  2. Sparse microangiopathy in the white matter, especially the left  corona radiata.  3. Tonsillar ectopia is noted with moderate crowding of the foramen  magnum.  4. Otherwise, there is no enhancing lesion, hemorrhage, edema, mass,  restricted diffusion or acute ischemic insult.  5. Therefore, acute intracranial abnormalities are not identified.      DICTATED: 09/14/2014  EDITED: 09/14/2014       Reading Radiologist- JOSEFINA KILGORE  Releasing Radiologist- JOSEFINA KILGORE  Released Date Time- 09/14/14 1440  - TON  ------------------------------------------------------------------------------   Results for orders placed or performed during the hospital encounter of 09/11/14   EEG     Narrative     Ordered by an unspecified provider.         Assessment:     1. Status Migrainosus     Plan:      -continue VPA,  Topamax, antiemetics, Tizanidine, prn analgesics  -limited due to comorbidities (no DHE 2/2 CAD, no prolonged steroid course due to DM)  -reviewed dx and tx plan

## 2017-04-17 LAB
GLUCOSE BLDC GLUCOMTR-MCNC: 102 MG/DL (ref 70–130)
GLUCOSE BLDC GLUCOMTR-MCNC: 154 MG/DL (ref 70–130)
GLUCOSE BLDC GLUCOMTR-MCNC: 161 MG/DL (ref 70–130)
GLUCOSE BLDC GLUCOMTR-MCNC: 171 MG/DL (ref 70–130)

## 2017-04-17 PROCEDURE — 25010000002 HEPARIN (PORCINE) PER 1000 UNITS: Performed by: HOSPITALIST

## 2017-04-17 PROCEDURE — 99225 PR SBSQ OBSERVATION CARE/DAY 25 MINUTES: CPT | Performed by: INTERNAL MEDICINE

## 2017-04-17 PROCEDURE — 96372 THER/PROPH/DIAG INJ SC/IM: CPT

## 2017-04-17 PROCEDURE — 97530 THERAPEUTIC ACTIVITIES: CPT

## 2017-04-17 PROCEDURE — 99212 OFFICE O/P EST SF 10 MIN: CPT | Performed by: PSYCHIATRY & NEUROLOGY

## 2017-04-17 PROCEDURE — G0378 HOSPITAL OBSERVATION PER HR: HCPCS

## 2017-04-17 PROCEDURE — 97110 THERAPEUTIC EXERCISES: CPT

## 2017-04-17 PROCEDURE — 63710000001 PROCHLORPERAZINE MALEATE PER 10 MG: Performed by: NURSE PRACTITIONER

## 2017-04-17 PROCEDURE — 97116 GAIT TRAINING THERAPY: CPT

## 2017-04-17 PROCEDURE — 82962 GLUCOSE BLOOD TEST: CPT

## 2017-04-17 PROCEDURE — 63710000001 INSULIN DETEMIR PER 5 UNITS: Performed by: FAMILY MEDICINE

## 2017-04-17 RX ADMIN — HYDROCODONE BITARTRATE AND ACETAMINOPHEN 1 TABLET: 7.5; 325 TABLET ORAL at 17:05

## 2017-04-17 RX ADMIN — ATENOLOL 25 MG: 25 TABLET ORAL at 08:38

## 2017-04-17 RX ADMIN — ASPIRIN 81 MG: 81 TABLET, CHEWABLE ORAL at 08:38

## 2017-04-17 RX ADMIN — AMLODIPINE BESYLATE 2.5 MG: 2.5 TABLET ORAL at 08:38

## 2017-04-17 RX ADMIN — PROCHLORPERAZINE MALEATE 10 MG: 10 TABLET, FILM COATED ORAL at 12:12

## 2017-04-17 RX ADMIN — INSULIN LISPRO 2 UNITS: 100 INJECTION, SOLUTION INTRAVENOUS; SUBCUTANEOUS at 20:47

## 2017-04-17 RX ADMIN — TRAMADOL HYDROCHLORIDE 50 MG: 50 TABLET, COATED ORAL at 08:37

## 2017-04-17 RX ADMIN — HEPARIN SODIUM 5000 UNITS: 5000 INJECTION, SOLUTION INTRAVENOUS; SUBCUTANEOUS at 05:40

## 2017-04-17 RX ADMIN — LISINOPRIL 40 MG: 20 TABLET ORAL at 08:38

## 2017-04-17 RX ADMIN — CLOPIDOGREL BISULFATE 75 MG: 75 TABLET, FILM COATED ORAL at 08:39

## 2017-04-17 RX ADMIN — GABAPENTIN 300 MG: 300 CAPSULE ORAL at 05:41

## 2017-04-17 RX ADMIN — TOPIRAMATE 100 MG: 100 TABLET, FILM COATED ORAL at 09:25

## 2017-04-17 RX ADMIN — TRAMADOL HYDROCHLORIDE 50 MG: 50 TABLET, COATED ORAL at 15:22

## 2017-04-17 RX ADMIN — GABAPENTIN 300 MG: 300 CAPSULE ORAL at 20:45

## 2017-04-17 RX ADMIN — MONTELUKAST SODIUM 10 MG: 10 TABLET, FILM COATED ORAL at 20:45

## 2017-04-17 RX ADMIN — INSULIN LISPRO 2 UNITS: 100 INJECTION, SOLUTION INTRAVENOUS; SUBCUTANEOUS at 12:06

## 2017-04-17 RX ADMIN — HEPARIN SODIUM 5000 UNITS: 5000 INJECTION, SOLUTION INTRAVENOUS; SUBCUTANEOUS at 20:46

## 2017-04-17 RX ADMIN — INSULIN DETEMIR 55 UNITS: 100 INJECTION, SOLUTION SUBCUTANEOUS at 20:50

## 2017-04-17 RX ADMIN — PROCHLORPERAZINE MALEATE 10 MG: 10 TABLET, FILM COATED ORAL at 17:01

## 2017-04-17 RX ADMIN — INSULIN LISPRO 2 UNITS: 100 INJECTION, SOLUTION INTRAVENOUS; SUBCUTANEOUS at 17:00

## 2017-04-17 RX ADMIN — HEPARIN SODIUM 5000 UNITS: 5000 INJECTION, SOLUTION INTRAVENOUS; SUBCUTANEOUS at 13:27

## 2017-04-17 RX ADMIN — GABAPENTIN 300 MG: 300 CAPSULE ORAL at 13:27

## 2017-04-17 RX ADMIN — PROCHLORPERAZINE MALEATE 10 MG: 10 TABLET, FILM COATED ORAL at 05:40

## 2017-04-17 RX ADMIN — ATORVASTATIN CALCIUM 40 MG: 40 TABLET, FILM COATED ORAL at 20:45

## 2017-04-17 NOTE — PLAN OF CARE
Problem: Patient Care Overview (Adult)  Goal: Plan of Care Review  Outcome: Ongoing (interventions implemented as appropriate)    04/17/17 1151   Coping/Psychosocial Response Interventions   Plan Of Care Reviewed With patient   Patient Care Overview   Progress progress toward functional goals as expected   Outcome Evaluation   Outcome Summary/Follow up Plan patient with increased weakness and had episodes of LOB needing assist to correct.         Problem: Inpatient Physical Therapy  Goal: Transfer Training Goal 1 STG- PT  Outcome: Ongoing (interventions implemented as appropriate)    04/13/17 0959 04/17/17 1151   Transfer Training PT STG   Transfer Training PT STG, Date Established 04/13/17 --    Transfer Training PT STG, Time to Achieve 2 wks --    Transfer Training PT STG, Activity Type all transfers --    Transfer Training PT STG, Crowley Level independent --    Transfer Training PT STG, Date Goal Reviewed --  04/17/17   Transfer Training PT STG, Outcome --  goal ongoing       Goal: Gait Training Goal STG- PT  Outcome: Ongoing (interventions implemented as appropriate)    04/13/17 0959 04/17/17 1151   Gait Training PT STG   Gait Training Goal PT STG, Date Established 04/13/17 --    Gait Training Goal PT STG, Time to Achieve 2 wks --    Gait Training Goal PT STG, Crowley Level independent --    Gait Training Goal PT STG, Assist Device cane, straight --    Gait Training Goal PT STG, Distance to Achieve 200 --    Gait Training Goal PT STG, Date Goal Reviewed --  04/17/17   Gait Training Goal PT STG, Outcome --  goal ongoing

## 2017-04-17 NOTE — PROGRESS NOTES
"Daily Progress Note  Neurology     LOS: 1 day     Subjective     Interval History:  Patient still complains of headache but states that it is \"a little better.\"    ROS: positive for headache    Objective     Vital signs in last 24 hours:  Temp:  [98.6 °F (37 °C)] 98.6 °F (37 °C)  Heart Rate:  [58-75] 67  Resp:  [16-20] 20  BP: (139-151)/(75-87) 151/87      Physical Exam:   General: Lying in bed with eyes closed. In NAD.     Respiratory: Respirations unlabored   CV: RRR       Neurologic Exam:   Mental status: Somnolent but easily arsouable and attentive. Follows commands. Speech fluent   CN: II-XII appear intact                          Results from last 7 days  Lab Units 04/12/17  0427   WBC 10*3/mm3 4.97   HEMOGLOBIN g/dL 13.0*   HEMATOCRIT % 40.3   PLATELETS 10*3/mm3 113*           Results Review:  Labs reviewed      Assessment/Plan     Principal Problem:    Weakness generalized  Active Problems:    Coronary artery disease involving native coronary artery of native heart without angina pectoris    Hypercholesterolemia    Diabetic polyneuropathy associated with type 2 diabetes mellitus    Migraine with aura and with status migrainosus    Hypertension    Seizure disorder    Anxiety    Diabetes mellitus    Lower back pain        1. Migraine with aura with status migrainosus = HA still present per his report. On topiramate 100mg daily and Depacon switched back to oral Depakote ER 1500mg daily. As needed analgesics do not seem to provide any long-standing relief. Unable to use DHE due to his history of CAD. Similarly, unable to use aggressive steroid treatment due to his DM. Magnesium sulfate was unsuccessful. At this time, I informed the patient that we have likely reached the limit of inpatient options. I agree with Dr. Saldivar that he may need evaluation and treatment of chronic migraine in the outpatient setting with Dr. Shoemaker, with options such as Botox. I will defer additional adjustment of inpatient " analgesics to the primary team, as I have no further recommendations.    2. Seizure disorder = stable. on topiramate and Depakote        Jina Menjivar MD  04/17/17  12:15 PM

## 2017-04-17 NOTE — CONSULTS
"Diabetes Education  Assessment/Teaching    Patient Name:  Denzel Harding  YOB: 1960  MRN: [id number]  Admit Date:  4/11/2017      Assessment Date:  4/17/2017       Most Recent Value    General Information      Referral From:  MD order    Height  5' 10\" (1.778 m)    Height Method  Stated    Weight  260 lb (118 kg)    Weight Method  Stated    Pregnancy Assessment     Diabetes History     What type of diabetes do you have?  Type 2    Current DM knowledge  fair    Do you have any diabetes complications?  circulation problems    Education Preferences     What areas of diabetes would you like to learn about?  avoiding high blood sugar, diabetes complications, understanding diabetes, medications for diabetes    Nutrition Information     Assessment Topics     Healthy Eating - Assessment  Needs education    Being Active - Assessment  Needs education    Taking Medication - Assessment  Needs education    Problem Solving - Assessment  Needs education    Reducing Risk - Assessment  Needs education    Healthy Coping - Assessment  Competent    Monitoring - Assessment  Competent    DM Goals                Most Recent Value    DM Education Needs     Meter  Has own    Frequency of Testing  2 times a day    Medication  Oral, Insulin, Actions, Side effects, Administration, Pen [Pt's home meds are Invokana, Lanuts, and Humalog]    Problem Solving  Hypoglycemia, Hyperglycemia, Signs, Symptoms, Treatment    Reducing Risks  A1C testing    Physical Activity Frequency  Discussed exercise importance    Healthy Coping  Appropriate    Discharge Plan  Home, Follow-up with PCP    Motivation  Moderate    Teaching Method  Explanation, Discussion, Handouts, Teach back    Patient Response  Verbalized understanding            Other Comments:  15 minutes. Pt was not receptive to education today. Pt states \"I had it a long time, I just need a magic pill\".  Discussed and taught patient about type 2 diabetes self-management,  and " importance of blood glucose control to reduce complications. Target blood glucose readings and A1c goals per ADA were reviewed. Reviewed with patient current A1c of 9.2% and discussed its significance. Signs, symptoms and treatment of hyperglycemia and hypoglycemia were discussed. Lifestyle changes such as physical activity with MD approval and healthy eating were encouraged. Pt was encouraged to follow-up with PCP.       Electronically signed by:  Mable Wheeler RN  04/17/17 10:34 AM

## 2017-04-17 NOTE — PROGRESS NOTES
"Hospitalist Daily Progress Note    Date of Admission: 4/11/2017   LOS: 1 day   PCP: Ottoniel Toledo MD    Chief Complaint:  Migraine HA, weakness    Subjective   Weakness - Generalized   Associated symptoms include headaches and nausea. Pertinent negatives include no chest pain, coughing, fever or vomiting.     Still having frontal headache.    Review of Systems   Constitutional: Negative for fever.   HENT:        Minimal phonophobia   Eyes: Positive for photophobia.   Respiratory: Negative for cough, chest tightness and shortness of breath.    Cardiovascular: Negative for chest pain.   Gastrointestinal: Positive for constipation and nausea. Negative for diarrhea and vomiting.   Genitourinary: Negative for dysuria.   Musculoskeletal: Positive for back pain.   Neurological: Positive for headaches.     Objective   Physical Exam:  I have reviewed the vital signs.  Objective:  Vital signs: (most recent): Blood pressure 138/75, pulse 68, temperature 98.6 °F (37 °C), temperature source Oral, resp. rate 20, height 70\" (177.8 cm), weight 260 lb (118 kg), SpO2 95 %.  No fever.          non toxic  obese  Awake, somewhat somnolent, speech clear, answers questions appropriately  RRR, no murmur rub or gallop  CTAB   Abdomen soft, non tender, non distended, normal bowel sounds  No lower extremity cyanosis, clubbing or edema  flat affect  Moves all extremities      Results Review:        Results from last 7 days  Lab Units 04/12/17  0427   WBC 10*3/mm3 4.97   HEMOGLOBIN g/dL 13.0*   PLATELETS 10*3/mm3 113*       Results from last 7 days  Lab Units 04/12/17  0427   SODIUM mmol/L 138   POTASSIUM mmol/L 3.6   TOTAL CO2 mmol/L 25.0   CREATININE mg/dL 0.70   GLUCOSE mg/dL 132*     Component      Latest Ref Rng & Units 4/14/2017 4/14/2017 4/14/2017 4/15/2017          11:28 AM  5:11 PM  8:58 PM    Glucose      70 - 130 mg/dL 258 (H) 306 (H) 307 (H) 219 (H)     No new labs 4.15.17    I have reviewed the medications.    amLODIPine 2.5 mg " Oral Daily   aspirin 81 mg Oral Daily   atenolol 25 mg Oral Daily   atorvastatin 40 mg Oral Nightly   clopidogrel 75 mg Oral Daily   divalproex 1,500 mg Oral Daily   docusate sodium 100 mg Oral BID   gabapentin 300 mg Oral Q8H   heparin (porcine) 5,000 Units Subcutaneous Q8H   insulin detemir 55 Units Subcutaneous Nightly   insulin lispro 0-9 Units Subcutaneous 4x Daily With Meals & Nightly   insulin lispro 8 Units Subcutaneous TID With Meals   lisinopril 40 mg Oral Daily   montelukast 10 mg Oral Nightly   Morphine 4 mg Intravenous Once   prochlorperazine 10 mg Oral Q6H   topiramate 100 mg Oral Daily       ---------------------------------------------------------------------------------------------  Assessment/Plan   Assessment & Plan  Assessment/Problem List    Principal Problem:    Weakness generalized  Active Problems:    Coronary artery disease involving native coronary artery of native heart without angina pectoris    Hypercholesterolemia    Diabetic polyneuropathy associated with type 2 diabetes mellitus    Migraine with aura and with status migrainosus    Hypertension    Seizure disorder    Anxiety    Diabetes mellitus    Lower back pain    Plan  Migraine HA  -MRI Brain no acute findings.  -Neurology consulted, continued topiramate, neurontin,depakote  -avoid DHE given his history of CAD and stent placement.  - possible dc in am - needs close follow up with neurology as outpatient.      Myalgias  -Flu negative, CRP wnl, ESR, TSH wnl, CXR no acute disease.  -Blood Cx NGTD  -PT/OT consults, not a rehab candidate.      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 Shoemaker with neurology.  -is on depakote, depakote levels wnl,  -continue ASA, Plavix, BP meds, statin.      DM2  -A1c 9.2, SSI, increased levemir, FSBS elevated  -Increase preprandial insulin to 8u QAC  - follow up PCP within one week    DVT prophylaxis:  heparin SQ  Discharge Planning:  I expect patient to be discharged to home tomorrow    Discussed plan with neurology Dr Tiara Mendoza MD 04/17/17 6:52 PM

## 2017-04-17 NOTE — PROGRESS NOTES
Acute Care - Occupational Therapy Treatment Note  [facility]     Patient Name: Denzel Harding  : 1960  MRN: [id number]  Today's Date: 2017  Onset of Illness/Injury or Date of Surgery Date: 17  Date of Referral to OT: 17  Referring Physician: MD Michele      Admit Date: 2017    Visit Dx:     ICD-10-CM ICD-9-CM   1. Weakness generalized R53.1 780.79   2. Intractable episodic headache, unspecified headache type R51 784.0   3. Impaired mobility and ADLs Z74.09 799.89   4. Impaired functional mobility, balance, gait, and endurance Z74.09 V49.89     Patient Active Problem List   Diagnosis   • Coronary artery disease involving native coronary artery of native heart without angina pectoris   • Pre-operative cardiovascular examination   • Lumbar radiculopathy   • Hypercholesterolemia   • Diabetic polyneuropathy associated with type 2 diabetes mellitus   • Migraine with aura and with status migrainosus   • Palpitations   • Chest pain   • Shortness of breath   • Edema   • Hypertension   • Seizure disorder   • GERD (gastroesophageal reflux disease)   • Brachial neuritis   • Cervical radiculopathy   • LOM (loss of memory)   • Weakness generalized   • Anxiety   • Diabetes mellitus   • Lower back pain             Adult Rehabilitation Note       17 0735 17 1111 17 0911    Rehab Assessment/Intervention    Discipline occupational therapist  -AC occupational therapist  -EVER physical therapist  -KM    Document Type therapy note (daily note)  -AC therapy note (daily note)  -EVER therapy note (daily note)  -KM    Subjective Information agree to therapy;complains of;pain  -AC agree to therapy;complains of;pain  -EVER agree to therapy  -KM    Patient Effort, Rehab Treatment good  -AC good  -EVER good  -KM    Symptoms Noted Comment  continued headache pain  -EVER     Precautions/Limitations fall precautions  -AC fall precautions  -EVER     Recorded by [AC] Emmy Wilson OT [EVER] Yasmin Herbert OT [KM]  Faith De, PT    Vital Signs    Pre Systolic BP Rehab  145  -EVER     Pre Treatment Diastolic BP  87  -EVER     Post Systolic BP Rehab  134  -EVER     Post Treatment Diastolic BP  78  -EVER     Pretreatment Heart Rate (beats/min)  77  -EVER     Posttreatment Heart Rate (beats/min)  74  -EVER     Pre SpO2 (%) 95  -AC 98  -EVER     O2 Delivery Pre Treatment room air  -AC supplemental O2  -EVER     Post SpO2 (%) 95  -AC 96  -EVER     O2 Delivery Post Treatment room air  -AC supplemental O2   2L  -EVER     Pre Patient Position  Supine  -EVER     Intra Patient Position  Standing  -EVER     Post Patient Position  Supine  -EVER     Recorded by [AC] Emmy Wilson, OT [EVER] Yasmin Herbert, OT     Pain Assessment    Pain Assessment 0-10  -AC 0-10  -EVER John-Fuentes FACES  -KM    John-Fuentes FACES Pain Rating   0  -KM    Pain Score 5  -AC 8  -EVER     Post Pain Score 5  -AC 8  -EVER     Pain Type Acute pain  -AC Acute pain  -EVER     Pain Location Head  -AC Head  -EVER     Pain Intervention(s)  Medication (See MAR)   per pt. meds for headache not really working  -EVER     Response to Interventions tolerated  -AC tolerated  -EVER     Recorded by [AC] Emmy Wilson, OT [EVER] Yasmin Herbert, OT [KM] Faith De, PT    Vision Assessment/Intervention    Visual Impairment  WFL with corrective lenses  -EVER     Recorded by  [EVER] Yasmin Herbert OT     Cognitive Assessment/Intervention    Current Cognitive/Communication Assessment functional  -AC functional  -EVER     Orientation Status oriented x 4   initially delayed in responding to questions  -AC oriented x 4  -EVER oriented x 4  -KM    Follows Commands/Answers Questions 100% of the time  -% of the time;able to follow single-step instructions  -EVER able to follow multi-step instructions;100% of the time  -KM    Personal Safety WNL/WFL  -AC WNL/WFL  -EVER WNL/WFL  -KM    Personal Safety Interventions fall prevention program maintained;gait belt;nonskid shoes/slippers when out of bed  -AC fall prevention  program maintained;gait belt;nonskid shoes/slippers when out of bed  -EVER fall prevention program maintained  -KM    Recorded by [AC] Emmy Wilson, OT [EVER] Yasmin Herbert, OT [KM] Faith De, PT    Bed Mobility, Assessment/Treatment    Bed Mobility, Assistive Device head of bed elevated  -AC head of bed elevated  -EVER     Bed Mob, Supine to Sit, [place] conditional independence  -AC conditional independence  -EVER independent  -KM    Bed Mob, Sit to Supine, [place]  conditional independence  -EVER independent  -KM    Recorded by [AC] Emmy Wilson, OT [EVER] Yasmin Herbert, OT [KM] Faith De, PT    Transfer Assessment/Treatment    Transfers, Sit-Stand [place] independent  -AC supervision required  -EVER supervision required  -KM    Transfers, Stand-Sit [place] independent  -AC supervision required  -EVER supervision required  -KM    Toilet Transfer, [place]  conditional independence  -EVER independent  -KM    Toilet Transfer, Assistive Device  --   grab bars  -EVER     Transfer, Comment   --   Pt assisted to bathroom and toileted/transferred independent  -KM    Recorded by [AC] Emmy Wilson, OT [EVER] Yasmin Herbert, OT [KM] Faith De, PT    Gait Assessment/Treatment    Gait, [place] Level   stand by assist  -KM    Gait, Assistive Device   rolling walker  -KM    Gait, Distance (Feet)   200   ambulated 20 with c.g.assist w/o wx  -KM    Recorded by   [KM] Faith De, PT    Functional Mobility    Functional Mobility- Ind. Level contact guard assist  -AC minimum assist (75% patient effort)  -EVER     Functional Mobility- Device --   gait belt  -AC other (see comments)   UE support  -EVER     Functional Mobility-Distance (Feet) 20  -AC 40   in room areas  -EVER     Functional Mobility- Comment 1 LOB to right  -AC impaired balance, one unsteady period leaning into wall.  -EVER     Recorded by [AC] Emmy Wilson, OT [EVER] Yasmin Herbert, OT     Lower Body Dressing  Assessment/Training    LB Dressing Assess/Train, Clothing Type doffing:;donning:;slipper socks  -AC      LB Dressing Assess/Train, Position sitting  -AC      LB Dressing Assess/Train, [place] independent  -AC      Recorded by [AC] Emmy Wilson OT      Toileting Assessment/Training    Toileting Assess/Train, Assistive Device grab bars  -AC grab bars  -EVER     Toileting Assess/Train, Position sitting;standing  -AC      Toileting Assess/Train, Indepen Level supervision required  -AC supervision required  -EVER     Toileting Assess/Train, Impairments  impaired balance  -EVER     Toileting Assess/Train, Comment hygiene ind and supervision for clothing mgmt   -AC      Recorded by [AC] Emmy Wilson, OT [EVER] Yasmin Herbert, OT     Grooming Assessment/Training    Grooming Assess/Train, Position standing  -AC standing  -EVER     Grooming Assess/Train, Indepen Level set up required  -AC supervision required  -EVER     Grooming Assess/Train, Impairments  impaired balance   limited activity tolerance  -EVER     Grooming Assess/Train, Comment wash hands  -AC      Recorded by [AC] Emmy Wilson, OT [EVER] Yasmin Herbert OT     Motor Skills/Interventions    Additional Documentation  Balance Skills Training (Group)  -EVER Balance Skills Training (Group)  -KM    Recorded by  [EVER] Yasmin Herbert, OT [KM] Faith De, PT    Balance Skills Training    Sitting-Level of Assistance Independent  -AC Independent  -EVER Independent  -KM    Sitting-Balance Support Feet supported  -AC Feet supported  -EVER Feet supported  -KM    Standing-Level of Assistance Distant supervision  -AC Distant supervision  -EVER Independent  -KM    Standing-Balance Activities  --   grooming sink  -EVER Weight Shift A-P;Weight Shift R-L;Reaching for objects  -KM    Gait Balance-Level of Assistance Contact guard  -AC  Contact guard  -KM    Recorded by [AC] Emmy Wilson, OT [EVER] Yasmin Herbert, OT [KM] Faiht De, PT    Therapy Exercises    Bilateral Upper  Extremity  AROM:;15 reps;sitting;elbow flexion/extension;shoulder abduction/adduction;shoulder extension/flexion;shoulder horizontal abd/add;shoulder protraction/retraction;shoulder rolls/shrugs  -EVER     BUE Resistance theraband   10 reps of 6 ex with red tband  -AC manual resistance- moderate;other (comment)   biceps and triceps  -EVER     Recorded by [AC] Emmy Wilson, OT [EVER] Yasmin Herbert, OT     Positioning and Restraints    Pre-Treatment Position in bed  -AC in bed  -EVER in bed  -KM    Post Treatment Position chair  -AC bed  -EVER bed  -KM    In Bed  supine;call light within reach;with nsg  -EVER supine;call light within reach;encouraged to call for assist  -KM    In Chair call light within reach;sitting;encouraged to call for assist  -AC      Recorded by [AC] Emmy Wilson, OT [EVER] Yasmin Herbert, OT [KM] Faith De, PT      User Key  (r) = Recorded By, (t) = Taken By, (c) = Cosigned By    Initials Name Effective Dates    EVER Yasmin Herbert, OT 06/22/15 -     AC Emmy Wilson, OT 06/23/15 -     KM Faith De, PT 06/19/15 -                 OT Goals       04/17/17 0817 04/14/17 1328 04/12/17 1534    Transfer Training OT LTG    Transfer Training OT LTG, Date Established   04/12/17  -    Transfer Training OT LTG, Time to Achieve   by discharge  -    Transfer Training OT LTG, Activity Type   sit to stand/stand to sit;toilet  -    Transfer Training OT LTG, [place] Level   supervision required  -    Transfer Training OT LTG, Assist Device   walker, rolling  -    Transfer Training OT LTG, Outcome  goal met  -EVER goal ongoing  -    Strength OT LTG    Strength Goal OT LTG, Date Established   04/12/17  -    Strength Goal OT LTG, Time to Achieve   by discharge  -    Strength Goal OT LTG, Measure to Achieve   Pt will increase BUE to 5/5 to increase independence with ADLs.  -    Strength Goal OT LTG, Outcome goal ongoing  -AC goal ongoing  -EVER goal ongoing  -    Toileting OT LTG     Toileting Goal OT LTG, Date Established   04/12/17  -    Toileting Goal OT LTG, Time to Achieve   by discharge  -    Toileting Goal OT LTG, [place] Level   set up required  -    Toileting Goal OT LTG, Outcome  goal met  -EVER goal ongoing  -    Functional Mobility OT LTG    Functional Mobility Goal OT LTG, Date Established   04/12/17  -    Functional Mobility Goal OT LTG, Time to Achieve   by discharge  -    Functional Mobility Goal OT LTG, [place] Level   contact guard  -    Functional Mobility Goal OT LTG, Assist Device   rolling walker  -    Functional Mobility Goal OT LTG, Distance to Achieve   to the bathroom  -    Functional Mobility Goal OT LTG, Outcome  goal met  -EVER goal ongoing  -      User Key  (r) = Recorded By, (t) = Taken By, (c) = Cosigned By    Initials Name Provider Type    EVER Herbert, OT Occupational Therapist    LONG Wilson, OT Occupational Therapist    REBEKA Tubbs, OT Occupational Therapist          Occupational Therapy Education     Title: PT OT SLP Therapies (Active)     Topic: Occupational Therapy (Active)     Point: ADL training (Done)    Description: Instruct learner(s) on proper safety adaptation and remediation techniques during self care or transfers.   Instruct in proper use of assistive devices.    Learning Progress Summary    Learner Readiness Method Response Comment Documented by Status   Patient Acceptance EKENN UE ROM exer. benefits activity, balance safety  04/14/17 1328 Done    Acceptance OPAL STILL   04/12/17 1534 Done   Family Acceptance E OPAL OLSEN   04/12/17 1534 Done               Point: Home exercise program (Done)    Description: Instruct learner(s) on appropriate technique for monitoring, assisting and/or progressing therapeutic exercises/activities.    Learning Progress Summary    Learner Readiness Method Response Comment Documented by Status   Patient Acceptance E,TB,D RAÚL educated in strengthening UE HEP  04/17/17 0817 Done     Acceptance E,D VU UE ROM exer. benefits activity, balance safety  04/14/17 1328 Done               Point: Body mechanics (Done)    Description: Instruct learner(s) on proper positioning and spine alignment during self-care, functional mobility activities and/or exercises.    Learning Progress Summary    Learner Readiness Method Response Comment Documented by Status   Patient Acceptance E VU,NR   04/12/17 1534 Done   Family Acceptance E VU,NR   04/12/17 1534 Done                      User Key     Initials Effective Dates Name Provider Type Discipline     06/22/15 -  Yasmin Herbert, OT Occupational Therapist OT     06/23/15 -  Emmy Wilson, OT Occupational Therapist OT     03/14/16 -  Danielle ALEN Tubbs, OT Occupational Therapist OT                  OT Recommendation and Plan  Anticipated Discharge Disposition: home with assist, home with outpatient services  Planned Therapy Interventions: ADL retraining, balance training, bed mobility training, home exercise program, strengthening, transfer training  Therapy Frequency: daily  Plan of Care Review  Plan Of Care Reviewed With: patient  Progress: progress toward functional goals as expected  Outcome Summary/Follow up Plan: Pt issued strengthening HEP and gave return demo and required cues; could use 1 more review.  Pt with good tolerance for activity today, but continues to have decreased balance as pt with 1 LOB to right when ambulating to bathroom.         Outcome Measures       04/17/17 0735 04/14/17 0911       How much help from another person do you currently need...    Turning from your back to your side while in flat bed without using bedrails?  4  -KM     Moving from lying on back to sitting on the side of a flat bed without bedrails?  4  -KM     Moving to and from a bed to a chair (including a wheelchair)?  3  -KM     Standing up from a chair using your arms (e.g., wheelchair, bedside chair)?  3  -KM     Climbing 3-5 steps with a railing?  3  -KM     To  walk in hospital room?  3  -KM     AM-PAC 6 Clicks Score  20  -KM     How much help from another is currently needed...    Putting on and taking off regular lower body clothing? 3  -AC      Bathing (including washing, rinsing, and drying) 3  -AC      Toileting (which includes using toilet bed pan or urinal) 3  -AC      Putting on and taking off regular upper body clothing 4  -AC      Taking care of personal grooming (such as brushing teeth) 4  -AC      Eating meals 4  -AC      Score 21  -AC      Functional Assessment    Outcome Measure Options AM-PAC 6 Clicks Daily Activity (OT)  -AC AM-PAC 6 Clicks Basic Mobility (PT)  -KM       User Key  (r) = Recorded By, (t) = Taken By, (c) = Cosigned By    Initials Name Provider Type     Emmy Wilson OT Occupational Therapist     Faith De, PT Physical Therapist           Time Calculation:         Time Calculation- OT       04/17/17 0821          Time Calculation- OT    OT Start Time 0735  -      Total Timed Code Minutes- OT 25 minute(s)  -      OT Received On 04/17/17  -      OT Goal Re-Cert Due Date 04/22/17  -        User Key  (r) = Recorded By, (t) = Taken By, (c) = Cosigned By    Initials Name Provider Type     Emmy Wilson OT Occupational Therapist           Therapy Charges for Today     Code Description Service Date Service Provider Modifiers Qty    [id number]  OT THERAPEUTIC ACT EA 15 MIN 4/17/2017 Emmy Wilson OT GO 2          OT G-codes  OT Professional Judgement Used?: Yes  OT Functional Scales Options: AM-PAC 6 Clicks Daily Activity (OT)  Score: 19  Functional Limitation: Self care  Self Care Current Status (): At least 40 percent but less than 60 percent impaired, limited or restricted  Self Care Goal Status (): At least 20 percent but less than 40 percent impaired, limited or restricted    Emmy Wilson OT  4/17/2017

## 2017-04-17 NOTE — PLAN OF CARE
Problem: Fall Risk (Adult)  Goal: Identify Related Risk Factors and Signs and Symptoms  Outcome: Ongoing (interventions implemented as appropriate)    04/17/17 1665   Fall Risk   Fall Risk: Related Risk Factors impaired vision;gait/mobility problems   Fall Risk: Signs and Symptoms presence of risk factors       Goal: Absence of Falls  Outcome: Ongoing (interventions implemented as appropriate)    04/17/17 1155   Fall Risk (Adult)   Absence of Falls making progress toward outcome

## 2017-04-17 NOTE — PLAN OF CARE
Problem: Patient Care Overview (Adult)  Goal: Plan of Care Review  Outcome: Ongoing (interventions implemented as appropriate)    04/16/17 1748 04/17/17 0233   Coping/Psychosocial Response Interventions   Plan Of Care Reviewed With --  patient   Patient Care Overview   Progress progress toward functional goals as expected --    Outcome Evaluation   Outcome Summary/Follow up Plan --  Tolerating current plan of care, 1 Excedrin Migraine before bed, slept well. Plan for D/C home in AM         Problem: Pain, Acute (Adult)  Goal: Acceptable Pain Control/Comfort Level  Outcome: Ongoing (interventions implemented as appropriate)    Problem: Seizure Disorder/Epilepsy (Adult)  Goal: Signs and Symptoms of Listed Potential Problems Will be Absent or Manageable (Seizure Disorder/Epilepsy)  Outcome: Ongoing (interventions implemented as appropriate)

## 2017-04-17 NOTE — PLAN OF CARE
Problem: Patient Care Overview (Adult)  Goal: Plan of Care Review  Outcome: Ongoing (interventions implemented as appropriate)    04/17/17 0817   Coping/Psychosocial Response Interventions   Plan Of Care Reviewed With patient   Patient Care Overview   Progress progress toward functional goals as expected   Outcome Evaluation   Outcome Summary/Follow up Plan Pt issued strengthening HEP and gave return demo and required cues; could use 1 more review. Pt with good tolerance for activity today, but continues to have decreased balance as pt with 1 LOB to right when ambulating to bathroom.          Problem: Inpatient Occupational Therapy  Goal: Strength Goal LTG- OT  Outcome: Ongoing (interventions implemented as appropriate)    04/12/17 1534 04/17/17 0817   Strength OT LTG   Strength Goal OT LTG, Date Established 04/12/17 --    Strength Goal OT LTG, Time to Achieve by discharge --    Strength Goal OT LTG, Measure to Achieve Pt will increase BUE to 5/5 to increase independence with ADLs. --    Strength Goal OT LTG, Outcome --  goal ongoing

## 2017-04-17 NOTE — PROGRESS NOTES
Continued Stay Note  [facility]     Patient Name: Denzel Haridng  MRN: [id number]  Today's Date: 4/17/2017    Admit Date: 4/11/2017          Discharge Plan       04/17/17 1322    Case Management/Social Work Plan    Plan Home    Patient/Family In Agreement With Plan yes    Additional Comments Spoke with patient at bedside.  Plan remains to return home with spouse to [facility].  Pt denies any discharge needs.  Family to transport at discharge.  CM will continue to follow.               Discharge Codes     None        Expected Discharge Date and Time     Expected Discharge Date Expected Discharge Time    Apr 17, 2017             Carole Gomez RN

## 2017-04-17 NOTE — PLAN OF CARE
Problem: Patient Care Overview (Adult)  Goal: Plan of Care Review  Outcome: Ongoing (interventions implemented as appropriate)    04/17/17 1656   Coping/Psychosocial Response Interventions   Plan Of Care Reviewed With patient   Patient Care Overview   Progress progress towards functional goals is fair       Goal: Discharge Needs Assessment  Outcome: Ongoing (interventions implemented as appropriate)    04/17/17 1656   Discharge Needs Assessment   Concerns To Be Addressed no discharge needs identified   Readmission Within The Last 30 Days no previous admission in last 30 days   Discharge Disposition still a patient;home or self-care   Living Environment   Transportation Available family or friend will provide;car   Self-Care   Equipment Currently Used at Home cane, straight;walker, rolling         Problem: Pain, Acute (Adult)  Goal: Acceptable Pain Control/Comfort Level  Outcome: Ongoing (interventions implemented as appropriate)    04/17/17 1656   Pain, Acute (Adult)   Acceptable Pain Control/Comfort Level making progress toward outcome         Problem: Seizure Disorder/Epilepsy (Adult)  Goal: Signs and Symptoms of Listed Potential Problems Will be Absent or Manageable (Seizure Disorder/Epilepsy)  Outcome: Ongoing (interventions implemented as appropriate)    04/17/17 1656   Seizure Disorder/Epilepsy   Problems Assessed (Seizure Disorder/Epilepsy) all   Problems Present (Seizure Disorder/Epilepsy) none

## 2017-04-17 NOTE — PROGRESS NOTES
Acute Care - Physical Therapy Treatment Note  [facility]     Patient Name: Denzel Harding  : 1960  MRN: [id number]  Today's Date: 2017  Onset of Illness/Injury or Date of Surgery Date: 17  Date of Referral to PT: 17  Referring Physician: MD Michele    Admit Date: 2017    Visit Dx:    ICD-10-CM ICD-9-CM   1. Weakness generalized R53.1 780.79   2. Intractable episodic headache, unspecified headache type R51 784.0   3. Impaired mobility and ADLs Z74.09 799.89   4. Impaired functional mobility, balance, gait, and endurance Z74.09 V49.89     Patient Active Problem List   Diagnosis   • Coronary artery disease involving native coronary artery of native heart without angina pectoris   • Pre-operative cardiovascular examination   • Lumbar radiculopathy   • Hypercholesterolemia   • Diabetic polyneuropathy associated with type 2 diabetes mellitus   • Migraine with aura and with status migrainosus   • Palpitations   • Chest pain   • Shortness of breath   • Edema   • Hypertension   • Seizure disorder   • GERD (gastroesophageal reflux disease)   • Brachial neuritis   • Cervical radiculopathy   • LOM (loss of memory)   • Weakness generalized   • Anxiety   • Diabetes mellitus   • Lower back pain               Adult Rehabilitation Note       17 1100 17 0735       Rehab Assessment/Intervention    Discipline physical therapy assistant  -AS occupational therapist  -AC     Document Type therapy note (daily note)  -AS therapy note (daily note)  -AC     Subjective Information agree to therapy;complains of;weakness  -AS agree to therapy;complains of;pain  -AC     Patient Effort, Rehab Treatment good  -AS good  -AC     Symptoms Noted During/After Treatment fatigue  -AS      Precautions/Limitations fall precautions  -AS fall precautions  -AC     Recorded by [AS] Esperanza Dawkins PTA [AC] Emmy Wilson OT     Vital Signs    Pre SpO2 (%)  95  -AC     O2 Delivery Pre Treatment  room air  -AC      Post SpO2 (%)  95  -AC     O2 Delivery Post Treatment  room air  -AC     Recorded by  [AC] Emmy Wilson OT     Pain Assessment    Pain Assessment No/denies pain  -AS 0-10  -AC     Pain Score 0  -AS 5  -AC     Post Pain Score 0  -AS 5  -AC     Pain Type  Acute pain  -AC     Pain Location  Head  -AC     Response to Interventions  tolerated  -AC     Recorded by [AS] Esperanza Dawkins PTA [AC] Emmy Wilson OT     Cognitive Assessment/Intervention    Current Cognitive/Communication Assessment functional  -AS functional  -AC     Orientation Status oriented x 4  -AS oriented x 4   initially delayed in responding to questions  -AC     Follows Commands/Answers Questions 100% of the time  -% of the time  -AC     Personal Safety WNL/WFL  -AS WNL/WFL  -AC     Personal Safety Interventions fall prevention program maintained;gait belt;nonskid shoes/slippers when out of bed;elopement precautions initiated  -AS fall prevention program maintained;gait belt;nonskid shoes/slippers when out of bed  -AC     Recorded by [AS] Esperanza Dawkins PTA [AC] Emmy Wilson OT     Bed Mobility, Assessment/Treatment    Bed Mobility, Assistive Device  head of bed elevated  -AC     Bed Mob, Supine to Sit, [place] independent  -AS conditional independence  -AC     Bed Mob, Sit to Supine, [place] independent  -AS      Recorded by [AS] Esperanza Dawkins PTA [AC] Emmy Wilson OT     Transfer Assessment/Treatment    Transfers, Sit-Stand [place] stand by assist  -AS independent  -AC     Transfers, Stand-Sit [place] stand by assist  -AS independent  -AC     Transfers, Sit-Stand-Sit, Assist Device rolling walker  -AS      Transfer, Safety Issues balance decreased during turns;step length decreased;loses balance backward  -AS      Transfer, Impairments strength decreased;impaired balance  -AS      Transfer, Comment patient had LOB and needed walker for support this session  -AS      Recorded by [AS] Esperanza Dawkins,  PTA [AC] Emmy Wilson, ALIS     Gait Assessment/Treatment    Gait, [place] Level verbal cues required;contact guard assist  -AS      Gait, Assistive Device rolling walker  -AS      Gait, Distance (Feet) 200  -AS      Gait, Gait Deviations han decreased;step length decreased  -AS      Gait, Safety Issues step length decreased;balance decreased during turns  -AS      Gait, Impairments strength decreased;impaired balance  -AS      Gait, Comment 1 epsiode of LOB when truning in hallway, assist to correct.  -AS      Recorded by [AS] Esperanza Dawkins PTA      Functional Mobility    Functional Mobility- Ind. Level  contact guard assist  -AC     Functional Mobility- Device  --   gait belt  -AC     Functional Mobility-Distance (Feet)  20  -AC     Functional Mobility- Comment  1 LOB to right  -AC     Recorded by  [AC] Emmy Wilson OT     Lower Body Dressing Assessment/Training    LB Dressing Assess/Train, Clothing Type  doffing:;donning:;slipper socks  -AC     LB Dressing Assess/Train, Position  sitting  -AC     LB Dressing Assess/Train, [place]  independent  -AC     Recorded by  [AC] Emmy Wilson OT     Toileting Assessment/Training    Toileting Assess/Train, Assistive Device  grab bars  -AC     Toileting Assess/Train, Position  sitting;standing  -AC     Toileting Assess/Train, Indepen Level  supervision required  -AC     Toileting Assess/Train, Comment  hygiene ind and supervision for clothing mgmt   -AC     Recorded by  [AC] Emmy Wilson OT     Grooming Assessment/Training    Grooming Assess/Train, Position  standing  -AC     Grooming Assess/Train, Indepen Level  set up required  -AC     Grooming Assess/Train, Comment  wash hands  -AC     Recorded by  [AC] Emmy Wilson OT     Balance Skills Training    Sitting-Level of Assistance  Independent  -AC     Sitting-Balance Support  Feet supported  -AC     Standing-Level of Assistance  Distant supervision  -AC     Gait Balance-Level of Assistance  Contact  guard  -AC     Recorded by  [AC] Emmy Wilson OT     Therapy Exercises    Bilateral Lower Extremities AROM:;10 reps;sitting;ankle pumps/circles;hip flexion;LAQ  -AS      BUE Resistance  theraband   10 reps of 6 ex with red tband  -AC     Recorded by [AS] Esperanza Dawkins PTA [AC] Emmy Wilson OT     Positioning and Restraints    Pre-Treatment Position in bed  -AS in bed  -AC     Post Treatment Position bed  -AS chair  -AC     In Bed supine;call light within reach;encouraged to call for assist;exit alarm on  -AS      In Chair  call light within reach;sitting;encouraged to call for assist  -AC     Recorded by [AS] Esperanza Dawkins PTA [AC] Emmy Wilson OT       User Key  (r) = Recorded By, (t) = Taken By, (c) = Cosigned By    Initials Name Effective Dates    AC Emmy Wilson OT 06/23/15 -     AS Esperanza Dawkins PTA 06/22/15 -                 IP PT Goals       04/17/17 1151 04/14/17 0945 04/13/17 0959    Bed Mobility PT STG    Bed Mobility PT STG, Date Established   04/13/17  -KM    Bed Mobility PT STG, Time to Achieve   2 wks  -KM    Bed Mobility PT STG, Activity Type   all bed mobility  -KM    Bed Mobility PT STG, [place] Level   independent  -KM    Bed Mobility PT STG, Outcome  goal met  -KM     Transfer Training PT STG    Transfer Training PT STG, Date Established   04/13/17  -KM    Transfer Training PT STG, Time to Achieve   2 wks  -KM    Transfer Training PT STG, Activity Type   all transfers  -KM    Transfer Training PT STG, [place] Level   independent  -KM    Transfer Training PT STG, Date Goal Reviewed 04/17/17  -AS      Transfer Training PT STG, Outcome goal ongoing  -AS goal ongoing  -KM     Gait Training PT STG    Gait Training Goal PT STG, Date Established   04/13/17  -KM    Gait Training Goal PT STG, Time to Achieve   2 wks  -KM    Gait Training Goal PT STG, [place] Level   independent  -KM    Gait Training Goal PT STG, Assist Device   cane, straight  -KM    Gait Training  Goal PT STG, Distance to Achieve   200  -KM    Gait Training Goal PT STG, Date Goal Reviewed 04/17/17  -AS      Gait Training Goal PT STG, Outcome goal ongoing  -AS goal ongoing  -KM       User Key  (r) = Recorded By, (t) = Taken By, (c) = Cosigned By    Initials Name Provider Type    KM Faith De, PT Physical Therapist    AS Esperanza Dawkins, GRAYSON Physical Therapy Assistant          Physical Therapy Education     Title: PT OT SLP Therapies (Active)     Topic: Physical Therapy (Active)     Point: Mobility training (Active)    Learning Progress Summary    Learner Readiness Method Response Comment Documented by Status   Patient Acceptance E NR  AS 04/17/17 1151 Active    Acceptance E VU  KM 04/14/17 0944 Done    Acceptance E VU  KM 04/13/17 0958 Done               Point: Home exercise program (Active)    Learning Progress Summary    Learner Readiness Method Response Comment Documented by Status   Patient Acceptance E NR  AS 04/17/17 1151 Active    Acceptance E VU  KM 04/14/17 0944 Done    Acceptance E VU  KM 04/13/17 0958 Done               Point: Body mechanics (Active)    Learning Progress Summary    Learner Readiness Method Response Comment Documented by Status   Patient Acceptance E NR  AS 04/17/17 1151 Active    Acceptance E VU  KM 04/14/17 0944 Done    Acceptance E VU  KM 04/13/17 0958 Done               Point: Precautions (Active)    Learning Progress Summary    Learner Readiness Method Response Comment Documented by Status   Patient Acceptance E NR  AS 04/17/17 1151 Active    Acceptance E VU  KM 04/14/17 0944 Done    Acceptance E VU  KM 04/13/17 0958 Done                      User Key     Initials Effective Dates Name Provider Type Discipline     06/19/15 -  Faith De, PT Physical Therapist PT    AS 06/22/15 -  Esperanza Dawkins PTA Physical Therapy Assistant PT                    PT Recommendation and Plan  Anticipated Discharge Disposition: home with assist (may benefit from  outpt therapy)  PT Frequency: daily  Plan of Care Review  Plan Of Care Reviewed With: patient  Progress: progress toward functional goals as expected  Outcome Summary/Follow up Plan: patient with increased weakness and had episodes of LOB needing assist to correct.          Outcome Measures       04/17/17 1100 04/17/17 0735       How much help from another person do you currently need...    Turning from your back to your side while in flat bed without using bedrails? 4  -AS      Moving from lying on back to sitting on the side of a flat bed without bedrails? 4  -AS      Moving to and from a bed to a chair (including a wheelchair)? 3  -AS      Standing up from a chair using your arms (e.g., wheelchair, bedside chair)? 3  -AS      Climbing 3-5 steps with a railing? 3  -AS      To walk in hospital room? 3  -AS      AM-PAC 6 Clicks Score 20  -AS      How much help from another is currently needed...    Putting on and taking off regular lower body clothing?  3  -AC     Bathing (including washing, rinsing, and drying)  3  -AC     Toileting (which includes using toilet bed pan or urinal)  3  -AC     Putting on and taking off regular upper body clothing  4  -AC     Taking care of personal grooming (such as brushing teeth)  4  -AC     Eating meals  4  -AC     Score  21  -AC     Functional Assessment    Outcome Measure Options AM-PAC 6 Clicks Basic Mobility (PT)  -AS AM-PAC 6 Clicks Daily Activity (OT)  -AC       User Key  (r) = Recorded By, (t) = Taken By, (c) = Cosigned By    Initials Name Provider Type    AC Emmy Wilson, OT Occupational Therapist    AS Esperanza Dawkins PTA Physical Therapy Assistant           Time Calculation:         PT Charges       04/17/17 1153          Time Calculation    Start Time 1100  -AS      PT Received On 04/17/17  -AS      PT Goal Re-Cert Due Date 04/23/17  -AS      Time Calculation- PT    Total Timed Code Minutes- PT 23 minute(s)  -AS        User Key  (r) = Recorded By, (t) = Taken By,  (c) = Cosigned By    Initials Name Provider Type    AS Esperanza Dawkins PTA Physical Therapy Assistant          Therapy Charges for Today     Code Description Service Date Service Provider Modifiers Qty    71894131260 HC GAIT TRAINING EA 15 MIN 4/17/2017 Esperanza Dawkins PTA GP 1    10505467148 HC PT THER PROC EA 15 MIN 4/17/2017 Esperanza Dawkins PTA GP 1          PT G-Codes  Outcome Measure Options: AM-PAC 6 Clicks Basic Mobility (PT)  Score: 20  Functional Limitation: Mobility: Walking and moving around  Mobility: Walking and Moving Around Current Status (): At least 20 percent but less than 40 percent impaired, limited or restricted  Mobility: Walking and Moving Around Goal Status (): 0 percent impaired, limited or restricted    Esperanza Dawkins PTA  4/17/2017

## 2017-04-18 VITALS
SYSTOLIC BLOOD PRESSURE: 133 MMHG | RESPIRATION RATE: 16 BRPM | BODY MASS INDEX: 37.22 KG/M2 | HEART RATE: 66 BPM | OXYGEN SATURATION: 93 % | TEMPERATURE: 97.4 F | WEIGHT: 260 LBS | HEIGHT: 70 IN | DIASTOLIC BLOOD PRESSURE: 81 MMHG

## 2017-04-18 LAB
GLUCOSE BLDC GLUCOMTR-MCNC: 160 MG/DL (ref 70–130)
GLUCOSE BLDC GLUCOMTR-MCNC: 186 MG/DL (ref 70–130)

## 2017-04-18 PROCEDURE — 25010000002 HEPARIN (PORCINE) PER 1000 UNITS: Performed by: HOSPITALIST

## 2017-04-18 PROCEDURE — 96372 THER/PROPH/DIAG INJ SC/IM: CPT

## 2017-04-18 PROCEDURE — 63710000001 PROCHLORPERAZINE MALEATE PER 10 MG: Performed by: NURSE PRACTITIONER

## 2017-04-18 PROCEDURE — G0378 HOSPITAL OBSERVATION PER HR: HCPCS

## 2017-04-18 PROCEDURE — 99217 PR OBSERVATION CARE DISCHARGE MANAGEMENT: CPT | Performed by: NURSE PRACTITIONER

## 2017-04-18 PROCEDURE — 82962 GLUCOSE BLOOD TEST: CPT

## 2017-04-18 RX ORDER — DIVALPROEX SODIUM 500 MG/1
1500 TABLET, DELAYED RELEASE ORAL EVERY EVENING
COMMUNITY
End: 2017-06-06

## 2017-04-18 RX ORDER — TOPIRAMATE 25 MG/1
50 TABLET ORAL 2 TIMES DAILY
COMMUNITY
End: 2017-05-03 | Stop reason: SDUPTHER

## 2017-04-18 RX ADMIN — INSULIN LISPRO 2 UNITS: 100 INJECTION, SOLUTION INTRAVENOUS; SUBCUTANEOUS at 12:05

## 2017-04-18 RX ADMIN — HEPARIN SODIUM 5000 UNITS: 5000 INJECTION, SOLUTION INTRAVENOUS; SUBCUTANEOUS at 05:40

## 2017-04-18 RX ADMIN — ATENOLOL 25 MG: 25 TABLET ORAL at 09:59

## 2017-04-18 RX ADMIN — LISINOPRIL 40 MG: 20 TABLET ORAL at 09:59

## 2017-04-18 RX ADMIN — HYDROCODONE BITARTRATE AND ACETAMINOPHEN 1 TABLET: 7.5; 325 TABLET ORAL at 05:40

## 2017-04-18 RX ADMIN — PROCHLORPERAZINE MALEATE 10 MG: 10 TABLET, FILM COATED ORAL at 00:18

## 2017-04-18 RX ADMIN — INSULIN LISPRO 2 UNITS: 100 INJECTION, SOLUTION INTRAVENOUS; SUBCUTANEOUS at 10:01

## 2017-04-18 RX ADMIN — GABAPENTIN 300 MG: 300 CAPSULE ORAL at 05:40

## 2017-04-18 RX ADMIN — DIVALPROEX SODIUM 1500 MG: 500 TABLET, FILM COATED, EXTENDED RELEASE ORAL at 09:59

## 2017-04-18 RX ADMIN — TOPIRAMATE 100 MG: 100 TABLET, FILM COATED ORAL at 09:59

## 2017-04-18 RX ADMIN — AMLODIPINE BESYLATE 2.5 MG: 2.5 TABLET ORAL at 09:59

## 2017-04-18 RX ADMIN — PROCHLORPERAZINE MALEATE 10 MG: 10 TABLET, FILM COATED ORAL at 05:40

## 2017-04-18 RX ADMIN — ASPIRIN 81 MG: 81 TABLET, CHEWABLE ORAL at 09:59

## 2017-04-18 RX ADMIN — PROCHLORPERAZINE MALEATE 10 MG: 10 TABLET, FILM COATED ORAL at 12:04

## 2017-04-18 RX ADMIN — HYDROCODONE BITARTRATE AND ACETAMINOPHEN 1 TABLET: 7.5; 325 TABLET ORAL at 00:25

## 2017-04-18 RX ADMIN — CLOPIDOGREL BISULFATE 75 MG: 75 TABLET, FILM COATED ORAL at 09:59

## 2017-04-18 NOTE — DISCHARGE SUMMARY
[facility] Medicine Services  DISCHARGE SUMMARY       Date of Admission: 4/11/2017  Date of Discharge:  4/18/2017  Primary Care Physician: Ottoniel Toledo MD  Consulting Physician(s)          None           Discharge Diagnoses:  Active Hospital Problems (** Indicates Principal Problem)    Diagnosis Date Noted   • **Weakness generalized [R53.1] 04/11/2017   • Anxiety [F41.9] 04/11/2017   • Diabetes mellitus [E11.9] 04/11/2017   • Lower back pain [M54.5] 04/11/2017     Right     • Hypertension [I10] 01/19/2017   • Seizure disorder [G40.909] 01/19/2017   • Diabetic polyneuropathy associated with type 2 diabetes mellitus [E11.42] 11/11/2016   • Migraine with aura and with status migrainosus [G43.101] 11/11/2016   • Hypercholesterolemia [E78.00] 11/02/2016      Assessed By: Yasmin Millard (Cardiology); Last Assessed: 13 Aug 2015     • Coronary artery disease involving native coronary artery of native heart without angina pectoris [I25.10] 06/07/2016      Resolved Hospital Problems    Diagnosis Date Noted Date Resolved   No resolved problems to display.       Presenting Problem/History of Present Illness  Weakness generalized [R53.1]  Weakness generalized [R53.1]     Chief Complaint on Day of Discharge: wants to go home    History of Present Illness on Day of Discharge: still with a mild headache 3/10 but this is improved. Wants to go home. No other complaints.     Hospital Course  Patient is a 57 y.o. 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 He presented to the ED with headaches, myaligias and weakness. He was admitted to the hospitalist service for further management.     Patient was worked up for myalgias with a negative work up. Flu was negative, CRP, ESR, TSH were all WNL. PT has seen and worked with patient and he is not a rehab candidate. He does sees  Dr. Shoemaker since his stroke. His mental status does seem to wax and wane.     He was found to have a migraine this admission and this was felt to be contributing to his weakness which has improved. His MRI brain showed no acute findings. Neurology was consulted and recommended continuing his home regimen. He is not a DHE candidate given his hx of CAD and stent placement. Neurology has maximized his inpatient hospital stay and recommends outpatient follow up with Dr. Shoemaker for possible botox injections.    Patients HgbA1c was 9.2 on admission. He needs close PCP f/u for continued insulin titration. He needs to work on his diabetic diet as well.       Procedures Performed         Consults:   Consults     Date and Time Order Name Status Description    4/11/2017 1821 Inpatient Consult to Neurology Completed           Pertinent Test Results:  Results for MIKY HASSAN (MRN [id number]) as of 4/18/2017 10:39   Ref. Range 4/12/2017 04:27   Glucose Latest Ref Range: 70 - 100 mg/dL 132 (H)   Sodium Latest Ref Range: 132 - 146 mmol/L 138   Potassium Latest Ref Range: 3.5 - 5.5 mmol/L 3.6   CO2 Latest Ref Range: 20.0 - 31.0 mmol/L 25.0   Chloride Latest Ref Range: 99 - 109 mmol/L 109   Anion Gap Latest Ref Range: 3.0 - 11.0 mmol/L 4.0   Creatinine Latest Ref Range: 0.60 - 1.30 mg/dL 0.70   BUN Latest Ref Range: 9 - 23 mg/dL 15   BUN/Creatinine Ratio Latest Ref Range: 7.0 - 25.0  21.4   Calcium Latest Ref Range: 8.7 - 10.4 mg/dL 9.2   eGFR Non African Amer Latest Ref Range: >60 mL/min/1.73 117   Hemoglobin A1C Latest Ref Range: 4.80 - 5.60 % 9.20 (H)   TSH Baseline Latest Ref Range: 0.350 - 5.350 mIU/mL 4.083   WBC Latest Ref Range: 3.50 - 10.80 10*3/mm3 4.97   RBC Latest Ref Range: 4.20 - 5.76 10*6/mm3 4.56   Hemoglobin Latest Ref Range: 13.1 - 17.5 g/dL 13.0 (L)   Hematocrit Latest Ref Range: 38.9 - 50.9 % 40.3   RDW Latest Ref Range: 11.3 - 14.5 % 13.9   MCV Latest Ref Range: 80.0 - 99.0 fL 88.4   MCH Latest Ref Range:  27.0 - 31.0 pg 28.5   MCHC Latest Ref Range: 32.0 - 36.0 g/dL 32.3   MPV Latest Ref Range: 6.0 - 12.0 fL 10.3   Platelets Latest Ref Range: 150 - 450 10*3/mm3 113 (L)   RDW-SD Latest Ref Range: 37.0 - 54.0 fl 45.2   Neutrophil % Latest Ref Range: 41.0 - 71.0 % 46.7   Lymphocyte % Latest Ref Range: 24.0 - 44.0 % 38.0   Monocyte % Latest Ref Range: 0.0 - 12.0 % 9.7   Eosinophil % Latest Ref Range: 0.0 - 3.0 % 4.4 (H)   Basophil % Latest Ref Range: 0.0 - 1.0 % 0.6   Immature Grans % Latest Ref Range: 0.0 - 0.6 % 0.6   Neutrophils, Absolute Latest Ref Range: 1.50 - 8.30 10*3/mm3 2.32   Lymphocytes, Absolute Latest Ref Range: 0.60 - 4.80 10*3/mm3 1.89   Monocytes, Absolute Latest Ref Range: 0.00 - 1.00 10*3/mm3 0.48   Eosinophils, Absolute Latest Ref Range: 0.10 - 0.30 10*3/mm3 0.22   Basophils, Absolute Latest Ref Range: 0.00 - 0.20 10*3/mm3 0.03   Immature Grans, Absolute Latest Ref Range: 0.00 - 0.03 10*3/mm3 0.03   Sed Rate Latest Ref Range: 0 - 20 mm/hr 2   Results for MIKY HASSAN (MRN [id number]) as of 4/18/2017 10:39   Ref. Range 4/12/2017 04:27   Valproic Acid Latest Ref Range: 50.0 - 150.0 mcg/mL 50.0     EXAMINATION: XR CHEST 1 VIEW-04/11/2017:       INDICATION: Weak/Dizzy/AMS, triage protocol.       COMPARISON: 02/03/2017.      FINDINGS: The cardiac silhouette is normal. There is no pulmonary  inflammatory process. There is no mass or effusion.       IMPRESSION:  No active disease.      D: 04/11/2017  E: 04/11/2017      This report was finalized on 4/11/2017 3:10 PM by Dr. South Gonzalez MD.    EXAMINATION: MRI BRAIN WO CONTRAST- 04/11/2017      INDICATION: weakness       TECHNIQUE: Images of the brain are displayed in the sagittal and axial  projections.      COMPARISON: 02/03/2017      FINDINGS: There is mucosal thickening in the right maxillary sinus.  There is no intracranial mass. There is no hemorrhage, midline shift or  extra-axial fluid collection. There are periventricular white matter  changes  "typical of aging. There is no cerebellopontine angle mass.  Pituitary gland is normal. There is no acute infarct.      IMPRESSION:  There are no acute findings. There has been no interval  change since the previous examination of 02/03/2017.       D: 04/11/2017  E: 04/11/2017          This report was finalized on 4/11/2017 3:10 PM by Dr. South Gonzalez MD.    Condition on Discharge: stable    Physical Exam on Discharge:/81  Pulse 66  Temp 97.4 °F (36.3 °C) (Oral)   Resp 16  Ht 70\" (177.8 cm)  Wt 260 lb (118 kg)  SpO2 93%  BMI 37.31 kg/m2  Physical Exam   Constitutional: He is oriented to person, place, and time. He appears well-nourished. No distress.   HENT:   Head: Normocephalic and atraumatic.   Eyes: Pupils are equal, round, and reactive to light. No scleral icterus.   Neck: Neck supple. No tracheal deviation present.   Cardiovascular: Normal rate, regular rhythm, normal heart sounds and intact distal pulses.    Pulmonary/Chest: Effort normal and breath sounds normal.   Abdominal: Soft. Bowel sounds are normal.   Musculoskeletal: He exhibits no edema.   Neurological: He is alert and oriented to person, place, and time.   Skin: Skin is warm and dry.   Psychiatric: He has a normal mood and affect. His behavior is normal.         Discharge Disposition  Home or Self Care    Discharge Medications   Denzel Harding   Home Medication Instructions MARGARITA:[id number]    Printed on:04/18/17 1318   Medication Information                      albuterol (PROVENTIL HFA;VENTOLIN HFA) 108 (90 BASE) MCG/ACT inhaler  ProAir  (90 Base) MCG/ACT Inhalation Aerosol Solution; Patient Sig: ProAir  (90 Base) MCG/ACT Inhalation Aerosol Solution ; 8; 0; 05-Oct-2015; Active             amLODIPine (NORVASC) 2.5 MG tablet  Take 1 tablet by mouth daily.             aspirin 81 MG tablet  Take 1 tablet by mouth daily.             atenolol (TENORMIN) 25 MG tablet  Take 1 tablet by mouth daily.             BYDUREON 2 MG " pen-injector  Inject 2 mg under the skin 1 (One) Time Per Week.             Cetirizine HCl 10 MG capsule  Take 1 capsule by mouth daily.             clopidogrel (PLAVIX) 75 MG tablet  Take 1 tablet by mouth daily.             divalproex (DEPAKOTE) 500 MG DR tablet  Take 1,500 mg by mouth Every Evening.             flunisolide (NASALIDE) 25 MCG/ACT (0.025%) solution nasal spray  INHALE 1 SPRAY EVERY 12 HOURS             furosemide (LASIX) 40 MG tablet  Take 1 tablet by mouth daily.             gabapentin (NEURONTIN) 300 MG capsule  Take 1 capsule by mouth 3 (Three) Times a Day.             insulin lispro (humaLOG) 100 UNIT/ML injection  Inject 40 Units under the skin 3 (Three) Times a Day Before Meals.             Insulin Pen Needle 31G X 5 MM misc               INVOKANA 300 MG tablet  Take 300 mg by mouth daily.             lisinopril (PRINIVIL,ZESTRIL) 40 MG tablet  Take 1 tablet by mouth daily.             montelukast (SINGULAIR) 10 MG tablet  Take 1 tablet by mouth Daily.             omeprazole (PriLOSEC) 20 MG capsule  Take 1 capsule by mouth daily.             potassium chloride (K-DUR,KLOR-CON) 20 MEQ CR tablet  Take 1 tablet by mouth daily.             simvastatin (ZOCOR) 20 MG tablet  Take 1 tablet by mouth daily. In the evening             SUMAtriptan (IMITREX) 100 MG tablet  Take one tablet with headache onset and may repeat in two one time             topiramate (TOPAMAX) 25 MG tablet  Take 50 mg by mouth 2 (Two) Times a Day.             TOUJEO SOLOSTAR 300 UNIT/ML solution pen-injector  Inject 150 unit marking on U-100 syringe as directed daily.             Vortioxetine HBr (TRINTELLIX) 10 MG tablet  Take 10 mg by mouth Daily.                 Discharge Diet:   Diet Instructions     Heart healthy, diabetic           cardiac, diabetic    Discharge Care Plan / Instructions: resume all home meds, f/u with Dr. Shoemaker to discuss botox    Activity at Discharge:   Activity Instructions     As tolerated            as tolerated    Follow-up Appointments  Future Appointments  Date Time Provider Department [place]ien   5/3/2017 10:15 AM MD CHYNA Banerjee [facility] None   5/30/2017 1:30 PM MD CHYNA Ortiz [facility] None   8/23/2017 11:30 AM MD CHYNA Banerjee [facility] None     Additional Instructions for the Follow-ups that You Need to Schedule     Discharge Follow-up with PCP    As directed    Follow Up Details:  PCP in 1 week, glucose   Has the patient’s follow-up appointment been scheduled and documented in the discharge navigator?:  Yes, documented in the appointment section       Discharge Follow-up with Specialty    As directed    Specialty:  Dr. Shoemaker in 1-2 weeks   Has the patient’s follow-up appointment been scheduled and documented in the discharge navigator?:  Yes, documented in the appointment section                    ANTONIO Garcia 04/18/17 1:18 PM    Time: 40 minutes    Please note that portions of this note may have been completed with a voice recognition program. Efforts were made to edit the dictations, but occasionally words are mistranscribed.

## 2017-04-18 NOTE — PROGRESS NOTES
Adult Nutrition  Assessment/PES    Patient Name:  Denzel Harding  YOB: 1960  MRN: [id number]  Admit Date:  4/11/2017    Assessment Date:  4/18/2017        Reason for Assessment       04/18/17 0900    Reason for Assessment    Reason For Assessment/Visit length of stay    Time Spent (min) 5                                Problem/Interventions:        Problem 1       04/18/17 0901    Nutrition Diagnoses Problem 1    Problem 1 Nutrition Appropriate for Condition at this Time    Signs/Symptoms (evidenced by) PO Intake                          Education/Evaluation       04/18/17 0901    Monitor/Evaluation    Monitor Per protocol        Comments:      Electronically signed by:  Ni Dempsey RD  04/18/17 9:01 AM

## 2017-04-18 NOTE — PROGRESS NOTES
Continued Stay Note  [facility]     Patient Name: Denzel Harding  MRN: [id number]  Today's Date: 4/18/2017    Admit Date: 4/11/2017          Discharge Plan       04/18/17 1337    Case Management/Social Work Plan    Plan Home    Patient/Family In Agreement With Plan yes    Additional Comments Pt denies any discharge needs.  Plan remains to return home with spouse.  Family to transport.               Discharge Codes     None        Expected Discharge Date and Time     Expected Discharge Date Expected Discharge Time    Apr 18, 2017             Carole Gomez RN

## 2017-04-18 NOTE — PLAN OF CARE
Problem: Patient Care Overview (Adult)  Goal: Plan of Care Review  Outcome: Ongoing (interventions implemented as appropriate)    Problem: Pain, Acute (Adult)  Goal: Acceptable Pain Control/Comfort Level  Outcome: Ongoing (interventions implemented as appropriate)    Problem: Seizure Disorder/Epilepsy (Adult)  Goal: Signs and Symptoms of Listed Potential Problems Will be Absent or Manageable (Seizure Disorder/Epilepsy)  Outcome: Ongoing (interventions implemented as appropriate)    Problem: Fall Risk (Adult)  Goal: Identify Related Risk Factors and Signs and Symptoms  Outcome: Ongoing (interventions implemented as appropriate)  Goal: Absence of Falls  Outcome: Ongoing (interventions implemented as appropriate)

## 2017-04-19 NOTE — THERAPY DISCHARGE NOTE
Acute Care - Physical Therapy Discharge Summary  [facility]       Patient Name: Denzel Harding  : 1960  MRN: [id number]    Today's Date: 2017  Onset of Illness/Injury or Date of Surgery Date: 17    Date of Referral to PT: 17  Referring Physician: MD Michele      Admit Date: 2017      PT Recommendation and Plan    Visit Dx:    ICD-10-CM ICD-9-CM   1. Weakness generalized R53.1 780.79   2. Intractable episodic headache, unspecified headache type R51 784.0   3. Impaired mobility and ADLs Z74.09 799.89   4. Impaired functional mobility, balance, gait, and endurance Z74.09 V49.89             Outcome Measures       17 1100 17 0735       How much help from another person do you currently need...    Turning from your back to your side while in flat bed without using bedrails? 4  -AS      Moving from lying on back to sitting on the side of a flat bed without bedrails? 4  -AS      Moving to and from a bed to a chair (including a wheelchair)? 3  -AS      Standing up from a chair using your arms (e.g., wheelchair, bedside chair)? 3  -AS      Climbing 3-5 steps with a railing? 3  -AS      To walk in hospital room? 3  -AS      AM-PAC 6 Clicks Score 20  -AS      How much help from another is currently needed...    Putting on and taking off regular lower body clothing?  3  -AC     Bathing (including washing, rinsing, and drying)  3  -AC     Toileting (which includes using toilet bed pan or urinal)  3  -AC     Putting on and taking off regular upper body clothing  4  -AC     Taking care of personal grooming (such as brushing teeth)  4  -AC     Eating meals  4  -AC     Score  21  -AC     Functional Assessment    Outcome Measure Options AM-PAC 6 Clicks Basic Mobility (PT)  -AS AM-PAC 6 Clicks Daily Activity (OT)  -AC       User Key  (r) = Recorded By, (t) = Taken By, (c) = Cosigned By    Initials Name Provider Type    AC Emmy Wilson, OT Occupational Therapist    AS Esperanza Dawkins PTA  Physical Therapy Assistant                      IP PT Goals       04/17/17 1151 04/14/17 0945 04/13/17 0959    Bed Mobility PT STG    Bed Mobility PT STG, Date Established   04/13/17  -KM    Bed Mobility PT STG, Time to Achieve   2 wks  -KM    Bed Mobility PT STG, Activity Type   all bed mobility  -KM    Bed Mobility PT STG, [place] Level   independent  -KM    Bed Mobility PT STG, Outcome  goal met  -KM     Transfer Training PT STG    Transfer Training PT STG, Date Established   04/13/17  -KM    Transfer Training PT STG, Time to Achieve   2 wks  -KM    Transfer Training PT STG, Activity Type   all transfers  -KM    Transfer Training PT STG, [place] Level   independent  -KM    Transfer Training PT STG, Date Goal Reviewed 04/17/17  -AS      Transfer Training PT STG, Outcome goal ongoing  -AS goal ongoing  -KM     Gait Training PT STG    Gait Training Goal PT STG, Date Established   04/13/17  -KM    Gait Training Goal PT STG, Time to Achieve   2 wks  -KM    Gait Training Goal PT STG, [place] Level   independent  -KM    Gait Training Goal PT STG, Assist Device   cane, straight  -KM    Gait Training Goal PT STG, Distance to Achieve   200  -KM    Gait Training Goal PT STG, Date Goal Reviewed 04/17/17  -AS      Gait Training Goal PT STG, Outcome goal ongoing  -AS goal ongoing  -KM       User Key  (r) = Recorded By, (t) = Taken By, (c) = Cosigned By    Initials Name Provider Type    SOL De, PT Physical Therapist    AS Esperanza Dawkins, PTA Physical Therapy Assistant            Goals Status: Treatment plan discontinued secondary to discharge from acute facility.    PT Discharge Summary  Reason for Discharge: Discharge from facility  Outcomes Achieved: Refer to plan of care for updates on goals achieved  Discharge Destination: Home with assist      Alyce Malhotra, PT   4/19/2017

## 2017-04-20 NOTE — THERAPY DISCHARGE NOTE
Acute Care - Occupational Therapy Discharge Summary  Psychiatric     Patient Name: Denzel Harding  : 1960  MRN: [id number]    Today's Date: 2017  Onset of Illness/Injury or Date of Surgery Date: 17    Date of Referral to OT: 17  Referring Physician: MD Michele      Admit Date: 2017        OT Recommendation and Plan    Visit Dx:    ICD-10-CM ICD-9-CM   1. Weakness generalized R53.1 780.79   2. Intractable episodic headache, unspecified headache type R51 784.0   3. Impaired mobility and ADLs Z74.09 799.89   4. Impaired functional mobility, balance, gait, and endurance Z74.09 V49.89                     OT Goals       17 0817 17 1328 17 1534    Transfer Training OT LTG    Transfer Training OT LTG, Date Established   17  -    Transfer Training OT LTG, Time to Achieve   by discharge  -    Transfer Training OT LTG, Activity Type   sit to stand/stand to sit;toilet  -    Transfer Training OT LTG, [place] Level   supervision required  -    Transfer Training OT LTG, Assist Device   walker, rolling  -    Transfer Training OT LTG, Outcome  goal met  -EVER goal ongoing  -    Strength OT LTG    Strength Goal OT LTG, Date Established   17  -    Strength Goal OT LTG, Time to Achieve   by discharge  -    Strength Goal OT LTG, Measure to Achieve   Pt will increase BUE to 5/5 to increase independence with ADLs.  -    Strength Goal OT LTG, Outcome goal ongoing  -AC goal ongoing  -EVER goal ongoing  -    Toileting OT LTG    Toileting Goal OT LTG, Date Established   17  -    Toileting Goal OT LTG, Time to Achieve   by discharge  -    Toileting Goal OT LTG, [place] Level   set up required  -    Toileting Goal OT LTG, Outcome  goal met  -EVER goal ongoing  -    Functional Mobility OT LTG    Functional Mobility Goal OT LTG, Date Established   17  -    Functional Mobility Goal OT LTG, Time to Achieve   by discharge  -    Functional  Mobility Goal OT LTG, [place] Level   contact guard  -    Functional Mobility Goal OT LTG, Assist Device   rolling walker  -    Functional Mobility Goal OT LTG, Distance to Achieve   to the bathroom  -    Functional Mobility Goal OT LTG, Outcome  goal met  - goal ongoing  -      User Key  (r) = Recorded By, (t) = Taken By, (c) = Cosigned By    Initials Name Provider Type    EVER Herbert, OT Occupational Therapist    LONG Wilson, OT Occupational Therapist    REBEKA Tubbs, OT Occupational Therapist                  OT Discharge Summary  Reason for Discharge: Discharge from facility  Outcomes Achieved: Refer to plan of care for updates on goals achieved  Discharge Destination: Home      Tuyet Huynh OT  4/20/2017

## 2017-04-23 ENCOUNTER — HOSPITAL ENCOUNTER (EMERGENCY)
Facility: HOSPITAL | Age: 57
Discharge: HOME | End: 2017-04-24
Attending: EMERGENCY MEDICINE | Admitting: EMERGENCY MEDICINE

## 2017-04-23 ENCOUNTER — APPOINTMENT (OUTPATIENT)
Dept: GENERAL RADIOLOGY | Facility: HOSPITAL | Age: 57
End: 2017-04-23

## 2017-04-23 DIAGNOSIS — G43.009 NONINTRACTABLE MIGRAINE, UNSPECIFIED MIGRAINE TYPE: Primary | ICD-10-CM

## 2017-04-23 DIAGNOSIS — R94.31 EKG ABNORMALITY: ICD-10-CM

## 2017-04-23 LAB
ALBUMIN SERPL-MCNC: 4.5 G/DL (ref 3.2–4.8)
ALBUMIN/GLOB SERPL: 1.7 G/DL (ref 1.5–2.5)
ALP SERPL-CCNC: 40 U/L (ref 25–100)
ALT SERPL W P-5'-P-CCNC: 34 U/L (ref 7–40)
ANION GAP SERPL CALCULATED.3IONS-SCNC: 8 MMOL/L (ref 3–11)
AST SERPL-CCNC: 52 U/L (ref 0–33)
BASOPHILS # BLD AUTO: 0.03 10*3/MM3 (ref 0–0.2)
BASOPHILS NFR BLD AUTO: 0.4 % (ref 0–1)
BILIRUB SERPL-MCNC: 0.5 MG/DL (ref 0.3–1.2)
BNP SERPL-MCNC: 17 PG/ML (ref 0–100)
BUN BLD-MCNC: 10 MG/DL (ref 9–23)
BUN/CREAT SERPL: 12.5 (ref 7–25)
CALCIUM SPEC-SCNC: 9.4 MG/DL (ref 8.7–10.4)
CHLORIDE SERPL-SCNC: 105 MMOL/L (ref 99–109)
CO2 SERPL-SCNC: 27 MMOL/L (ref 20–31)
CREAT BLD-MCNC: 0.8 MG/DL (ref 0.6–1.3)
D DIMER PPP FEU-MCNC: 0.27 MG/L (FEU) (ref 0–0.5)
DEPRECATED RDW RBC AUTO: 42.6 FL (ref 37–54)
EOSINOPHIL # BLD AUTO: 0.18 10*3/MM3 (ref 0.1–0.3)
EOSINOPHIL NFR BLD AUTO: 2.6 % (ref 0–3)
ERYTHROCYTE [DISTWIDTH] IN BLOOD BY AUTOMATED COUNT: 13.5 % (ref 11.3–14.5)
GFR SERPL CREATININE-BSD FRML MDRD: 100 ML/MIN/1.73
GLOBULIN UR ELPH-MCNC: 2.7 GM/DL
GLUCOSE BLD-MCNC: 134 MG/DL (ref 70–100)
HCT VFR BLD AUTO: 45.4 % (ref 38.9–50.9)
HGB BLD-MCNC: 15.2 G/DL (ref 13.1–17.5)
IMM GRANULOCYTES # BLD: 0.09 10*3/MM3 (ref 0–0.03)
IMM GRANULOCYTES NFR BLD: 1.3 % (ref 0–0.6)
LYMPHOCYTES # BLD AUTO: 2.63 10*3/MM3 (ref 0.6–4.8)
LYMPHOCYTES NFR BLD AUTO: 38.7 % (ref 24–44)
MCH RBC QN AUTO: 29 PG (ref 27–31)
MCHC RBC AUTO-ENTMCNC: 33.5 G/DL (ref 32–36)
MCV RBC AUTO: 86.6 FL (ref 80–99)
MONOCYTES # BLD AUTO: 0.62 10*3/MM3 (ref 0–1)
MONOCYTES NFR BLD AUTO: 9.1 % (ref 0–12)
NEUTROPHILS # BLD AUTO: 3.25 10*3/MM3 (ref 1.5–8.3)
NEUTROPHILS NFR BLD AUTO: 47.9 % (ref 41–71)
PLATELET # BLD AUTO: 128 10*3/MM3 (ref 150–450)
PMV BLD AUTO: 9.6 FL (ref 6–12)
POTASSIUM BLD-SCNC: 5.3 MMOL/L (ref 3.5–5.5)
PROT SERPL-MCNC: 7.2 G/DL (ref 5.7–8.2)
RBC # BLD AUTO: 5.24 10*6/MM3 (ref 4.2–5.76)
SODIUM BLD-SCNC: 140 MMOL/L (ref 132–146)
TROPONIN I SERPL-MCNC: <0.006 NG/ML
WBC NRBC COR # BLD: 6.8 10*3/MM3 (ref 3.5–10.8)

## 2017-04-23 PROCEDURE — 84484 ASSAY OF TROPONIN QUANT: CPT | Performed by: EMERGENCY MEDICINE

## 2017-04-23 PROCEDURE — 83880 ASSAY OF NATRIURETIC PEPTIDE: CPT | Performed by: EMERGENCY MEDICINE

## 2017-04-23 PROCEDURE — 85025 COMPLETE CBC W/AUTO DIFF WBC: CPT | Performed by: EMERGENCY MEDICINE

## 2017-04-23 PROCEDURE — 25010000002 DIPHENHYDRAMINE PER 50 MG: Performed by: EMERGENCY MEDICINE

## 2017-04-23 PROCEDURE — 25010000002 KETOROLAC TROMETHAMINE PER 15 MG: Performed by: EMERGENCY MEDICINE

## 2017-04-23 PROCEDURE — 85379 FIBRIN DEGRADATION QUANT: CPT | Performed by: EMERGENCY MEDICINE

## 2017-04-23 PROCEDURE — 93005 ELECTROCARDIOGRAM TRACING: CPT | Performed by: EMERGENCY MEDICINE

## 2017-04-23 PROCEDURE — 25010000002 METOCLOPRAMIDE PER 10 MG: Performed by: EMERGENCY MEDICINE

## 2017-04-23 PROCEDURE — 36415 COLL VENOUS BLD VENIPUNCTURE: CPT

## 2017-04-23 PROCEDURE — 71020 HC CHEST PA AND LATERAL: CPT

## 2017-04-23 PROCEDURE — 80053 COMPREHEN METABOLIC PANEL: CPT | Performed by: EMERGENCY MEDICINE

## 2017-04-23 PROCEDURE — 96375 TX/PRO/DX INJ NEW DRUG ADDON: CPT

## 2017-04-23 PROCEDURE — 96374 THER/PROPH/DIAG INJ IV PUSH: CPT

## 2017-04-23 PROCEDURE — 99284 EMERGENCY DEPT VISIT MOD MDM: CPT

## 2017-04-23 RX ORDER — METOCLOPRAMIDE HYDROCHLORIDE 5 MG/ML
10 INJECTION INTRAMUSCULAR; INTRAVENOUS ONCE
Status: COMPLETED | OUTPATIENT
Start: 2017-04-24 | End: 2017-04-24

## 2017-04-23 RX ORDER — KETOROLAC TROMETHAMINE 15 MG/ML
15 INJECTION, SOLUTION INTRAMUSCULAR; INTRAVENOUS ONCE
Status: COMPLETED | OUTPATIENT
Start: 2017-04-24 | End: 2017-04-24

## 2017-04-23 RX ORDER — DIPHENHYDRAMINE HYDROCHLORIDE 50 MG/ML
25 INJECTION INTRAMUSCULAR; INTRAVENOUS ONCE
Status: COMPLETED | OUTPATIENT
Start: 2017-04-23 | End: 2017-04-23

## 2017-04-23 RX ORDER — METOCLOPRAMIDE HYDROCHLORIDE 5 MG/ML
10 INJECTION INTRAMUSCULAR; INTRAVENOUS ONCE
Status: COMPLETED | OUTPATIENT
Start: 2017-04-23 | End: 2017-04-23

## 2017-04-23 RX ORDER — KETOROLAC TROMETHAMINE 15 MG/ML
15 INJECTION, SOLUTION INTRAMUSCULAR; INTRAVENOUS ONCE
Status: COMPLETED | OUTPATIENT
Start: 2017-04-23 | End: 2017-04-23

## 2017-04-23 RX ORDER — DIPHENHYDRAMINE HYDROCHLORIDE 50 MG/ML
25 INJECTION INTRAMUSCULAR; INTRAVENOUS ONCE
Status: COMPLETED | OUTPATIENT
Start: 2017-04-24 | End: 2017-04-24

## 2017-04-23 RX ADMIN — METOCLOPRAMIDE 10 MG: 5 INJECTION, SOLUTION INTRAMUSCULAR; INTRAVENOUS at 21:54

## 2017-04-23 RX ADMIN — KETOROLAC TROMETHAMINE 15 MG: 15 INJECTION, SOLUTION INTRAMUSCULAR; INTRAVENOUS at 21:54

## 2017-04-23 RX ADMIN — DIPHENHYDRAMINE HYDROCHLORIDE 25 MG: 50 INJECTION INTRAMUSCULAR; INTRAVENOUS at 21:54

## 2017-04-24 VITALS
HEIGHT: 70 IN | TEMPERATURE: 98.6 F | BODY MASS INDEX: 37.22 KG/M2 | OXYGEN SATURATION: 97 % | WEIGHT: 260 LBS | SYSTOLIC BLOOD PRESSURE: 147 MMHG | HEART RATE: 76 BPM | DIASTOLIC BLOOD PRESSURE: 78 MMHG | RESPIRATION RATE: 16 BRPM

## 2017-04-24 PROCEDURE — 25010000002 KETOROLAC TROMETHAMINE PER 15 MG: Performed by: EMERGENCY MEDICINE

## 2017-04-24 PROCEDURE — 25010000002 METOCLOPRAMIDE PER 10 MG: Performed by: EMERGENCY MEDICINE

## 2017-04-24 PROCEDURE — 25010000002 DIPHENHYDRAMINE PER 50 MG: Performed by: EMERGENCY MEDICINE

## 2017-04-24 PROCEDURE — 96376 TX/PRO/DX INJ SAME DRUG ADON: CPT

## 2017-04-24 RX ADMIN — DIPHENHYDRAMINE HYDROCHLORIDE 25 MG: 50 INJECTION INTRAMUSCULAR; INTRAVENOUS at 00:08

## 2017-04-24 RX ADMIN — METOCLOPRAMIDE 10 MG: 5 INJECTION, SOLUTION INTRAMUSCULAR; INTRAVENOUS at 00:11

## 2017-04-24 RX ADMIN — KETOROLAC TROMETHAMINE 15 MG: 15 INJECTION, SOLUTION INTRAMUSCULAR; INTRAVENOUS at 00:09

## 2017-04-24 NOTE — ED PROVIDER NOTES
Subjective   HPI Comments: 57 y.o. male presents to the ED w/ c/o worsening headache starting around 4 hours PTA. Pt has a history of chronic headaches and migraines and has been seen here in the ED several times in the past with negative CT and MRIs. He was most recently seen here in the ED on 4/11 for severe headache and generalized weakness. He states at the time of discharge his headache was still present, but improved from onset. Pt has continued to have a mild, dull headache since discharge home.     This evening around 1700 he took a Sumatriptan for his headache and developed a more severe generalized headache. He additionally developed a cramping pain in his bilateral upper arms and radiating up into his neck and head, along with cramping pain in his chest bilaterally. He states these arm and chest pains are new for him. Denies N/V or diaphoresis. No focal neurologic deficits reported.    Pt is scheduled to follow-up with neurologist Dr. Shoemaker to discuss potential Botox treatments.    Patient is a 57 y.o. male presenting with headaches.   History provided by:  Patient  Headache   Pain location:  Generalized  Quality:  Dull  Radiates to:  L neck, R neck, L shoulder, R shoulder, L arm and R arm  Pain severity now: severe.  Onset quality:  Unable to specify  Duration:  4 hours  Timing:  Constant  Chronicity:  Chronic  Similar to prior headaches: yes    Relieved by:  Nothing  Worsened by:  Light  Associated symptoms: no abdominal pain, no dizziness, no fever, no nausea, no neck stiffness, no numbness, no paresthesias, no vomiting and no weakness        Review of Systems   Constitutional: Negative for chills and fever.   Respiratory: Positive for chest tightness.    Gastrointestinal: Negative for abdominal pain, nausea and vomiting.   Musculoskeletal: Positive for arthralgias. Negative for neck stiffness.   Neurological: Positive for headaches. Negative for dizziness, weakness, numbness and paresthesias.   All  other systems reviewed and are negative.      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 EF of 60-65%. Grade 1 diastolic dysfunction of the LV myocardium. No evidence of pericardial effusion   • History of herniated intervertebral disc     History of left L5-S1 disc herniation   • Hypercholesterolemia 11/2/2016   • Hypertension    • LOM (loss of memory) 1/19/2017   • Lower back pain     Right   • Measles    • Obstructive sleep apnea    • Palpitations    • Seizure disorder    • Shortness of breath 1/19/2017   • SOB (shortness of breath)    • Stroke        Allergies   Allergen Reactions   • Sulfa Antibiotics Anaphylaxis, Nausea And Vomiting and Delirium   • Codeine Nausea And Vomiting     Can only take with Phenergan   • Morphine      Does not work. It causes pain       Past Surgical History:   Procedure Laterality Date   • CARDIAC CATHETERIZATION     • CARPAL TUNNEL RELEASE Right    • CHOLECYSTECTOMY     • CORONARY ANGIOPLASTY WITH STENT PLACEMENT     • KNEE ARTHROSCOPY Bilateral    • NEUROPLASTY / TRANSPOSITION ULNAR NERVE AT ELBOW     • OTHER SURGICAL HISTORY      Foraminotomy and discectomy       Family History   Problem Relation Age of Onset   • Hypertension Mother    • Alcohol abuse Father    • Heart disease Other    • Stroke Other    • Hypertension Other    • Other Other      Neurologic disorder   • Parkinsonism Other    • Stroke Other    • Heart disease Other    • Hypertension Other    • Heart disease Other    • Stroke Other    • Hypertension Other        Social History     Social History   • Marital status:       Spouse name: N/A   • Number of children: N/A   • Years of education: N/A     Social History Main Topics   • Smoking status: Former Smoker     Quit date: 1/1/1998   • Smokeless tobacco: None   • Alcohol use Yes      Comment: 3 PER YEAR   • Drug use: No   • Sexual activity: Defer     Other Topics Concern   • None     Social History Narrative         Objective   Physical Exam   Constitutional: He is oriented to person, place, and time. He appears well-developed and well-nourished. No distress.   HENT:   Head: Normocephalic and atraumatic.   Eyes: Conjunctivae are normal. Pupils are equal, round, and reactive to light.   Neck: Normal range of motion. Neck supple.   Cardiovascular: Normal rate, regular rhythm and normal heart sounds.    Pulmonary/Chest: Effort normal and breath sounds normal. No respiratory distress. He has no wheezes. He has no rales. He exhibits no tenderness.   Abdominal: Soft. There is no tenderness.   Musculoskeletal: Normal range of motion. He exhibits edema (1+ BLE).   Neurological: He is alert and oriented to person, place, and time.   Skin: Skin is warm and dry. He is not diaphoretic.   Psychiatric: He has a normal mood and affect. His behavior is normal.   Nursing note and vitals reviewed.      Procedures         ED Course  ED Course       Recent Results (from the past 24 hour(s))   Comprehensive Metabolic Panel    Collection Time: 04/23/17  9:44 PM   Result Value Ref Range    Glucose 134 (H) 70 - 100 mg/dL    BUN 10 9 - 23 mg/dL    Creatinine 0.80 0.60 - 1.30 mg/dL    Sodium 140 132 - 146 mmol/L    Potassium 5.3 3.5 - 5.5 mmol/L    Chloride 105 99 - 109 mmol/L    CO2 27.0 20.0 - 31.0 mmol/L    Calcium 9.4 8.7 - 10.4 mg/dL    Total Protein 7.2 5.7 - 8.2 g/dL    Albumin 4.50 3.20 - 4.80 g/dL    ALT (SGPT) 34 7 - 40 U/L    AST (SGOT) 52 (H) 0 - 33 U/L    Alkaline Phosphatase 40 25 - 100 U/L    Total Bilirubin 0.5 0.3 - 1.2 mg/dL    eGFR Non African Amer 100 >60 mL/min/1.73    Globulin 2.7  gm/dL    A/G Ratio 1.7 1.5 - 2.5 g/dL    BUN/Creatinine Ratio 12.5 7.0 - 25.0    Anion Gap 8.0 3.0 - 11.0 mmol/L   BNP    Collection Time: 04/23/17  9:44 PM   Result Value Ref Range    BNP 17.0 0.0 - 100.0 pg/mL   D-dimer, Quantitative    Collection Time: 04/23/17  9:44 PM   Result Value Ref Range    D-Dimer, Quantitative 0.27 0.00 - 0.50 mg/L (FEU)   CBC Auto Differential    Collection Time: 04/23/17  9:44 PM   Result Value Ref Range    WBC 6.80 3.50 - 10.80 10*3/mm3    RBC 5.24 4.20 - 5.76 10*6/mm3    Hemoglobin 15.2 13.1 - 17.5 g/dL    Hematocrit 45.4 38.9 - 50.9 %    MCV 86.6 80.0 - 99.0 fL    MCH 29.0 27.0 - 31.0 pg    MCHC 33.5 32.0 - 36.0 g/dL    RDW 13.5 11.3 - 14.5 %    RDW-SD 42.6 37.0 - 54.0 fl    MPV 9.6 6.0 - 12.0 fL    Platelets 128 (L) 150 - 450 10*3/mm3    Neutrophil % 47.9 41.0 - 71.0 %    Lymphocyte % 38.7 24.0 - 44.0 %    Monocyte % 9.1 0.0 - 12.0 %    Eosinophil % 2.6 0.0 - 3.0 %    Basophil % 0.4 0.0 - 1.0 %    Immature Grans % 1.3 (H) 0.0 - 0.6 %    Neutrophils, Absolute 3.25 1.50 - 8.30 10*3/mm3    Lymphocytes, Absolute 2.63 0.60 - 4.80 10*3/mm3    Monocytes, Absolute 0.62 0.00 - 1.00 10*3/mm3    Eosinophils, Absolute 0.18 0.10 - 0.30 10*3/mm3    Basophils, Absolute 0.03 0.00 - 0.20 10*3/mm3    Immature Grans, Absolute 0.09 (H) 0.00 - 0.03 10*3/mm3   Troponin    Collection Time: 04/23/17  9:44 PM   Result Value Ref Range    Troponin I <0.006 <=0.039 ng/mL     Note: In addition to lab results from this visit, the labs listed above may include labs taken at another facility or during a different encounter within the last 24 hours. Please correlate lab times with ED admission and discharge times for further clarification of the services performed during this visit.    XR Chest 2 View    (Results Pending)     Vitals:    04/23/17 1920 04/23/17 2001 04/23/17 2104 04/23/17 2315   BP: (!) 194/83 141/76 156/84 154/82   BP Location: Left arm      Patient Position: Sitting      Pulse: 78 75 76 76   Resp:   15     Temp:       SpO2:  98% 96% 94%   Weight:       Height:         Medications   diphenhydrAMINE (BENADRYL) injection 25 mg (not administered)   metoclopramide (REGLAN) injection 10 mg (not administered)   ketorolac (TORADOL) injection 15 mg (not administered)   diphenhydrAMINE (BENADRYL) injection 25 mg (25 mg Intravenous Given 4/23/17 2154)   ketorolac (TORADOL) injection 15 mg (15 mg Intravenous Given 4/23/17 2154)   metoclopramide (REGLAN) injection 10 mg (10 mg Intravenous Given 4/23/17 2154)     ECG/EMG Results (last 24 hours)     ** No results found for the last 24 hours. **                      MDM  Number of Diagnoses or Management Options  EKG abnormality: new and requires workup  Nonintractable migraine, unspecified migraine type: new and requires workup  Diagnosis management comments: Labs including initial troponin is normal.     Discussed desire to do repeat EKG and troponin, but patient refuses.     Will give repeat dose of migraine cocktail, and then patient desires discharge. Patient reports headache as improved.     DC home appearing well, normal vitals.        Amount and/or Complexity of Data Reviewed  Clinical lab tests: ordered and reviewed  Tests in the radiology section of CPT®: ordered and reviewed  Decide to obtain previous medical records or to obtain history from someone other than the patient: yes  Obtain history from someone other than the patient: yes  Review and summarize past medical records: yes  Discuss the patient with other providers: yes  Independent visualization of images, tracings, or specimens: yes    Patient Progress  Patient progress: stable      Final diagnoses:   Nonintractable migraine, unspecified migraine type   EKG abnormality       Documentation assistance provided by wen Vallejo.  Information recorded by the wen was done at my direction and has been verified and validated by me.     Sam Vallejo  04/23/17 2625       Thomas Rosas MD  04/24/17  0002

## 2017-05-03 ENCOUNTER — OFFICE VISIT (OUTPATIENT)
Dept: NEUROLOGY | Facility: CLINIC | Age: 57
End: 2017-05-03

## 2017-05-03 VITALS
HEART RATE: 82 BPM | OXYGEN SATURATION: 98 % | HEIGHT: 70 IN | SYSTOLIC BLOOD PRESSURE: 132 MMHG | DIASTOLIC BLOOD PRESSURE: 84 MMHG | WEIGHT: 262.6 LBS | BODY MASS INDEX: 37.59 KG/M2

## 2017-05-03 DIAGNOSIS — G43.111 INTRACTABLE MIGRAINE WITH AURA WITH STATUS MIGRAINOSUS: Primary | ICD-10-CM

## 2017-05-03 PROCEDURE — 99214 OFFICE O/P EST MOD 30 MIN: CPT | Performed by: PSYCHIATRY & NEUROLOGY

## 2017-05-03 RX ORDER — GABAPENTIN 300 MG/1
600 CAPSULE ORAL 3 TIMES DAILY
Qty: 180 CAPSULE | Refills: 11 | Status: SHIPPED | OUTPATIENT
Start: 2017-05-03 | End: 2017-06-12 | Stop reason: ALTCHOICE

## 2017-05-03 RX ORDER — TOPIRAMATE 100 MG/1
100 TABLET, FILM COATED ORAL 2 TIMES DAILY
Qty: 60 TABLET | Refills: 11 | Status: SHIPPED | OUTPATIENT
Start: 2017-05-03 | End: 2017-06-12 | Stop reason: ALTCHOICE

## 2017-05-23 ENCOUNTER — HOSPITAL ENCOUNTER (EMERGENCY)
Dept: HOSPITAL 79 - ER1 | Age: 57
Discharge: HOME | End: 2017-05-23
Payer: MEDICARE

## 2017-05-23 DIAGNOSIS — Z88.5: ICD-10-CM

## 2017-05-23 DIAGNOSIS — Z88.2: ICD-10-CM

## 2017-05-23 DIAGNOSIS — R11.10: Primary | ICD-10-CM

## 2017-05-23 DIAGNOSIS — I10: ICD-10-CM

## 2017-05-23 LAB
BUN/CREATININE RATIO: 27 (ref 0–10)
HGB BLD-MCNC: 15.8 GM/DL (ref 14–17.5)
RED BLOOD COUNT: 5.39 M/UL (ref 4.2–5.5)
WHITE BLOOD COUNT: 7.9 K/UL (ref 4.5–11)

## 2017-05-24 RX ORDER — MONTELUKAST SODIUM 10 MG/1
TABLET ORAL
Qty: 30 TABLET | Refills: 2 | Status: ON HOLD | OUTPATIENT
Start: 2017-05-24 | End: 2017-08-23 | Stop reason: SDUPTHER

## 2017-05-25 ENCOUNTER — HOSPITAL ENCOUNTER (INPATIENT)
Dept: HOSPITAL 30 - ER | Age: 57
LOS: 5 days | Discharge: HOME | DRG: 371 | End: 2017-05-30
Attending: INTERNAL MEDICINE | Admitting: INTERNAL MEDICINE
Payer: COMMERCIAL

## 2017-05-25 ENCOUNTER — HOSPITAL ENCOUNTER (EMERGENCY)
Dept: HOSPITAL 30 - ER | Age: 57
Discharge: ANOTHER HOSPITAL | End: 2017-05-25
Payer: MEDICARE

## 2017-05-25 VITALS — BODY MASS INDEX: 33.96 KG/M2 | WEIGHT: 237.22 LBS | HEIGHT: 70 IN

## 2017-05-25 DIAGNOSIS — T36.1X5A: ICD-10-CM

## 2017-05-25 DIAGNOSIS — I10: ICD-10-CM

## 2017-05-25 DIAGNOSIS — Z88.0: ICD-10-CM

## 2017-05-25 DIAGNOSIS — J45.909: ICD-10-CM

## 2017-05-25 DIAGNOSIS — A04.4: Primary | ICD-10-CM

## 2017-05-25 DIAGNOSIS — N39.0: ICD-10-CM

## 2017-05-25 DIAGNOSIS — Z88.2: ICD-10-CM

## 2017-05-25 DIAGNOSIS — Z79.02: ICD-10-CM

## 2017-05-25 DIAGNOSIS — E78.5: ICD-10-CM

## 2017-05-25 DIAGNOSIS — E83.42: ICD-10-CM

## 2017-05-25 DIAGNOSIS — Z90.49: ICD-10-CM

## 2017-05-25 DIAGNOSIS — Z79.899: ICD-10-CM

## 2017-05-25 DIAGNOSIS — R10.9: ICD-10-CM

## 2017-05-25 DIAGNOSIS — F17.210: ICD-10-CM

## 2017-05-25 DIAGNOSIS — E83.39: ICD-10-CM

## 2017-05-25 DIAGNOSIS — F41.9: ICD-10-CM

## 2017-05-25 DIAGNOSIS — E87.1: ICD-10-CM

## 2017-05-25 DIAGNOSIS — K21.9: ICD-10-CM

## 2017-05-25 DIAGNOSIS — E13.10: ICD-10-CM

## 2017-05-25 DIAGNOSIS — E86.0: Primary | ICD-10-CM

## 2017-05-25 DIAGNOSIS — Z88.5: ICD-10-CM

## 2017-05-25 DIAGNOSIS — E86.0: ICD-10-CM

## 2017-05-25 DIAGNOSIS — G43.909: ICD-10-CM

## 2017-05-25 DIAGNOSIS — E10.9: ICD-10-CM

## 2017-05-25 DIAGNOSIS — Z79.4: ICD-10-CM

## 2017-05-25 DIAGNOSIS — A08.11: ICD-10-CM

## 2017-06-06 ENCOUNTER — APPOINTMENT (OUTPATIENT)
Dept: PREADMISSION TESTING | Facility: HOSPITAL | Age: 57
End: 2017-06-06

## 2017-06-06 VITALS — HEIGHT: 70 IN | WEIGHT: 245 LBS | BODY MASS INDEX: 35.07 KG/M2

## 2017-06-06 RX ORDER — PREDNISOLONE ACETATE 10 MG/ML
SUSPENSION/ DROPS OPHTHALMIC
Refills: 0 | Status: ON HOLD | COMMUNITY
Start: 2017-05-17 | End: 2017-08-14

## 2017-06-06 RX ORDER — DICLOFENAC SODIUM 1 MG/ML
SOLUTION/ DROPS OPHTHALMIC
Refills: 0 | COMMUNITY
Start: 2017-05-17 | End: 2017-07-06

## 2017-06-12 ENCOUNTER — ANESTHESIA (OUTPATIENT)
Dept: PERIOP | Facility: HOSPITAL | Age: 57
End: 2017-06-12

## 2017-06-12 ENCOUNTER — ANESTHESIA EVENT (OUTPATIENT)
Dept: PERIOP | Facility: HOSPITAL | Age: 57
End: 2017-06-12

## 2017-06-12 ENCOUNTER — OFFICE VISIT (OUTPATIENT)
Dept: CARDIOLOGY | Facility: CLINIC | Age: 57
End: 2017-06-12

## 2017-06-12 ENCOUNTER — HOSPITAL ENCOUNTER (OUTPATIENT)
Facility: HOSPITAL | Age: 57
Setting detail: HOSPITAL OUTPATIENT SURGERY
Discharge: HOME | End: 2017-06-12
Attending: OPHTHALMOLOGY | Admitting: OPHTHALMOLOGY

## 2017-06-12 VITALS
OXYGEN SATURATION: 99 % | BODY MASS INDEX: 36.08 KG/M2 | RESPIRATION RATE: 16 BRPM | HEIGHT: 70 IN | HEART RATE: 68 BPM | DIASTOLIC BLOOD PRESSURE: 60 MMHG | SYSTOLIC BLOOD PRESSURE: 92 MMHG | WEIGHT: 252 LBS

## 2017-06-12 VITALS
DIASTOLIC BLOOD PRESSURE: 73 MMHG | SYSTOLIC BLOOD PRESSURE: 127 MMHG | HEART RATE: 66 BPM | TEMPERATURE: 97.4 F | RESPIRATION RATE: 18 BRPM | OXYGEN SATURATION: 98 %

## 2017-06-12 DIAGNOSIS — I25.110 CORONARY ARTERY DISEASE INVOLVING NATIVE CORONARY ARTERY OF NATIVE HEART WITH UNSTABLE ANGINA PECTORIS (HCC): Primary | ICD-10-CM

## 2017-06-12 DIAGNOSIS — I10 ESSENTIAL HYPERTENSION: ICD-10-CM

## 2017-06-12 LAB — GLUCOSE BLDC GLUCOMTR-MCNC: 293 MG/DL (ref 70–130)

## 2017-06-12 PROCEDURE — 25010000002 EPINEPHRINE 1 MG/ML SOLUTION 1 ML VIAL: Performed by: OPHTHALMOLOGY

## 2017-06-12 PROCEDURE — 82962 GLUCOSE BLOOD TEST: CPT

## 2017-06-12 PROCEDURE — 99213 OFFICE O/P EST LOW 20 MIN: CPT | Performed by: INTERNAL MEDICINE

## 2017-06-12 PROCEDURE — V2632 POST CHMBR INTRAOCULAR LENS: HCPCS | Performed by: OPHTHALMOLOGY

## 2017-06-12 PROCEDURE — 25010000002 MIDAZOLAM PER 1 MG: Performed by: NURSE ANESTHETIST, CERTIFIED REGISTERED

## 2017-06-12 DEVICE — LENS ACRYSOF IQ 6X13MM SA60WF 16.5: Type: IMPLANTABLE DEVICE | Site: POSTERIOR CHAMBER | Status: FUNCTIONAL

## 2017-06-12 RX ORDER — DICLOFENAC SODIUM 1 MG/ML
1 SOLUTION/ DROPS OPHTHALMIC 4 TIMES DAILY
Status: DISCONTINUED | OUTPATIENT
Start: 2017-06-12 | End: 2017-06-12 | Stop reason: HOSPADM

## 2017-06-12 RX ORDER — MOXIFLOXACIN 5 MG/ML
SOLUTION/ DROPS OPHTHALMIC AS NEEDED
Status: DISCONTINUED | OUTPATIENT
Start: 2017-06-12 | End: 2017-06-12 | Stop reason: HOSPADM

## 2017-06-12 RX ORDER — TETRACAINE HYDROCHLORIDE 5 MG/ML
1 SOLUTION OPHTHALMIC SEE ADMIN INSTRUCTIONS
Status: COMPLETED | OUTPATIENT
Start: 2017-06-12 | End: 2017-06-12

## 2017-06-12 RX ORDER — TETRACAINE HYDROCHLORIDE 5 MG/ML
SOLUTION OPHTHALMIC AS NEEDED
Status: DISCONTINUED | OUTPATIENT
Start: 2017-06-12 | End: 2017-06-12 | Stop reason: HOSPADM

## 2017-06-12 RX ORDER — TOPIRAMATE 100 MG/1
100 TABLET, FILM COATED ORAL DAILY
COMMUNITY
End: 2018-05-09 | Stop reason: SDUPTHER

## 2017-06-12 RX ORDER — CYCLOPENTOLATE HYDROCHLORIDE 20 MG/ML
1 SOLUTION/ DROPS OPHTHALMIC
Status: COMPLETED | OUTPATIENT
Start: 2017-06-12 | End: 2017-06-12

## 2017-06-12 RX ORDER — POLYMYXIN B SULFATE AND TRIMETHOPRIM 1; 10000 MG/ML; [USP'U]/ML
SOLUTION OPHTHALMIC
Status: DISCONTINUED
Start: 2017-06-12 | End: 2017-06-12 | Stop reason: WASHOUT

## 2017-06-12 RX ORDER — LEVOFLOXACIN 750 MG/1
1 TABLET ORAL TAKE AS DIRECTED
Refills: 0 | COMMUNITY
Start: 2017-05-30 | End: 2017-06-12

## 2017-06-12 RX ORDER — GABAPENTIN 300 MG/1
300 CAPSULE ORAL 2 TIMES DAILY
COMMUNITY
End: 2017-07-26 | Stop reason: SDUPTHER

## 2017-06-12 RX ORDER — MIDAZOLAM HYDROCHLORIDE 5 MG/ML
INJECTION INTRAMUSCULAR; INTRAVENOUS AS NEEDED
Status: DISCONTINUED | OUTPATIENT
Start: 2017-06-12 | End: 2017-06-12 | Stop reason: SURG

## 2017-06-12 RX ORDER — PROMETHAZINE HYDROCHLORIDE 25 MG/1
1 TABLET ORAL AS NEEDED
Refills: 0 | COMMUNITY
Start: 2017-05-30 | End: 2017-08-11 | Stop reason: HOSPADM

## 2017-06-12 RX ORDER — CYCLOPENTOLATE HYDROCHLORIDE 20 MG/ML
SOLUTION/ DROPS OPHTHALMIC
Status: COMPLETED
Start: 2017-06-12 | End: 2017-06-12

## 2017-06-12 RX ORDER — INSULIN LISPRO 100 [IU]/ML
40 INJECTION, SOLUTION INTRAVENOUS; SUBCUTANEOUS
Refills: 0 | Status: ON HOLD | COMMUNITY
Start: 2017-05-30 | End: 2017-12-22

## 2017-06-12 RX ORDER — PHENYLEPHRINE HYDROCHLORIDE 100 MG/ML
1 SOLUTION/ DROPS OPHTHALMIC
Status: COMPLETED | OUTPATIENT
Start: 2017-06-12 | End: 2017-06-12

## 2017-06-12 RX ORDER — POLYMYXIN B SULFATE AND TRIMETHOPRIM 1; 10000 MG/ML; [USP'U]/ML
SOLUTION OPHTHALMIC AS NEEDED
Status: DISCONTINUED | OUTPATIENT
Start: 2017-06-12 | End: 2017-06-12 | Stop reason: HOSPADM

## 2017-06-12 RX ORDER — PREDNISOLONE ACETATE 10 MG/ML
SUSPENSION/ DROPS OPHTHALMIC
Status: DISCONTINUED
Start: 2017-06-12 | End: 2017-06-12 | Stop reason: HOSPADM

## 2017-06-12 RX ORDER — BALANCED SALT SOLUTION 6.4; .75; .48; .3; 3.9; 1.7 MG/ML; MG/ML; MG/ML; MG/ML; MG/ML; MG/ML
SOLUTION OPHTHALMIC AS NEEDED
Status: DISCONTINUED | OUTPATIENT
Start: 2017-06-12 | End: 2017-06-12 | Stop reason: HOSPADM

## 2017-06-12 RX ORDER — DICLOFENAC SODIUM 1 MG/ML
SOLUTION/ DROPS OPHTHALMIC
Status: DISCONTINUED
Start: 2017-06-12 | End: 2017-06-12 | Stop reason: HOSPADM

## 2017-06-12 RX ORDER — PHENYLEPHRINE HYDROCHLORIDE 100 MG/ML
SOLUTION/ DROPS OPHTHALMIC
Status: COMPLETED
Start: 2017-06-12 | End: 2017-06-12

## 2017-06-12 RX ORDER — PREDNISOLONE ACETATE 10 MG/ML
SUSPENSION/ DROPS OPHTHALMIC AS NEEDED
Status: DISCONTINUED | OUTPATIENT
Start: 2017-06-12 | End: 2017-06-12 | Stop reason: HOSPADM

## 2017-06-12 RX ORDER — TETRACAINE HYDROCHLORIDE 5 MG/ML
SOLUTION OPHTHALMIC
Status: COMPLETED
Start: 2017-06-12 | End: 2017-06-12

## 2017-06-12 RX ORDER — SODIUM CHLORIDE 0.9 % (FLUSH) 0.9 %
3 SYRINGE (ML) INJECTION AS NEEDED
Status: DISCONTINUED | OUTPATIENT
Start: 2017-06-12 | End: 2017-06-12 | Stop reason: HOSPADM

## 2017-06-12 RX ORDER — SODIUM CHLORIDE, SODIUM LACTATE, POTASSIUM CHLORIDE, CALCIUM CHLORIDE 600; 310; 30; 20 MG/100ML; MG/100ML; MG/100ML; MG/100ML
1000 INJECTION, SOLUTION INTRAVENOUS CONTINUOUS PRN
Status: DISCONTINUED | OUTPATIENT
Start: 2017-06-12 | End: 2017-06-12 | Stop reason: HOSPADM

## 2017-06-12 RX ORDER — PREDNISOLONE ACETATE 10 MG/ML
1 SUSPENSION/ DROPS OPHTHALMIC
Status: DISCONTINUED | OUTPATIENT
Start: 2017-06-12 | End: 2017-06-12 | Stop reason: HOSPADM

## 2017-06-12 RX ORDER — POLYMYXIN B SULFATE AND TRIMETHOPRIM 1; 10000 MG/ML; [USP'U]/ML
1 SOLUTION OPHTHALMIC
Status: DISCONTINUED | OUTPATIENT
Start: 2017-06-12 | End: 2017-06-12 | Stop reason: HOSPADM

## 2017-06-12 RX ADMIN — TETRACAINE HYDROCHLORIDE 1 DROP: 5 SOLUTION OPHTHALMIC at 12:15

## 2017-06-12 RX ADMIN — MIDAZOLAM HYDROCHLORIDE 2 MG: 5 INJECTION, SOLUTION INTRAMUSCULAR; INTRAVENOUS at 13:53

## 2017-06-12 RX ADMIN — SODIUM CHLORIDE, POTASSIUM CHLORIDE, SODIUM LACTATE AND CALCIUM CHLORIDE 1000 ML: 600; 310; 30; 20 INJECTION, SOLUTION INTRAVENOUS at 12:33

## 2017-06-12 RX ADMIN — CYCLOPENTOLATE HYDROCHLORIDE 1 DROP: 20 SOLUTION/ DROPS OPHTHALMIC at 12:18

## 2017-06-12 RX ADMIN — PHENYLEPHRINE HYDROCHLORIDE 1 DROP: 100 SOLUTION/ DROPS OPHTHALMIC at 12:18

## 2017-06-12 RX ADMIN — CYCLOPENTOLATE HYDROCHLORIDE 1 DROP: 20 SOLUTION/ DROPS OPHTHALMIC at 12:28

## 2017-06-12 RX ADMIN — CYCLOPENTOLATE HYDROCHLORIDE 1 DROP: 20 SOLUTION/ DROPS OPHTHALMIC at 12:23

## 2017-06-12 RX ADMIN — PHENYLEPHRINE HYDROCHLORIDE 1 DROP: 100 SOLUTION/ DROPS OPHTHALMIC at 12:28

## 2017-06-12 RX ADMIN — TETRACAINE HYDROCHLORIDE 1 DROP: 5 SOLUTION OPHTHALMIC at 12:16

## 2017-06-12 RX ADMIN — PHENYLEPHRINE HYDROCHLORIDE 1 DROP: 100 SOLUTION/ DROPS OPHTHALMIC at 12:23

## 2017-06-12 RX ADMIN — TETRACAINE HYDROCHLORIDE 1 DROP: 5 SOLUTION OPHTHALMIC at 12:17

## 2017-06-12 NOTE — PLAN OF CARE
Problem: Perioperative Period (Adult)  Goal: Signs and Symptoms of Listed Potential Problems Will be Absent or Manageable (Perioperative Period)  Outcome: Ongoing (interventions implemented as appropriate)    06/12/17 1206   Perioperative Period   Problems Assessed (Perioperative Period) all   Problems Present (Perioperative Period) none

## 2017-06-12 NOTE — ANESTHESIA POSTPROCEDURE EVALUATION
Patient: Denzel Harding    Procedure Summary     Date Anesthesia Start Anesthesia Stop Room / Location    06/12/17 [id number] [id number]  [facility]       Procedure Diagnosis Surgeon Provider    CATARACT PHACO EXTRACTION WITH INTRAOCULAR LENS IMPLANT RIGHT  (Right Eye) No diagnosis on file. MD Maxwell Norton CRNA          Anesthesia Type: MAC  Last vitals  BP      Temp      Pulse     Resp      SpO2        Post Anesthesia Care and Evaluation    Patient location during evaluation: PACU  Patient participation: complete - patient participated  Level of consciousness: awake and alert  Pain score: 0  Pain management: satisfactory to patient  Airway patency: patent  Anesthetic complications: No anesthetic complications  PONV Status: none  Cardiovascular status: stable and acceptable  Respiratory status: acceptable  Hydration status: acceptable

## 2017-06-12 NOTE — PLAN OF CARE
Problem: Cataract (Adult)  Goal: Signs and Symptoms of Listed Potential Problems Will be Absent or Manageable (Cataract)  Outcome: Ongoing (interventions implemented as appropriate)    06/12/17 1207   Cataract   Problems Assessed (Cataract) all   Problems Present (Cataract) none

## 2017-06-12 NOTE — ANESTHESIA PREPROCEDURE EVALUATION
Anesthesia Evaluation     Patient summary reviewed and Nursing notes reviewed   NPO Solid Status: > 8 hours  NPO Liquid Status: > 8 hours     Airway   Mallampati: III  TM distance: >3 FB  Neck ROM: full  no difficulty expected  Dental      Pulmonary    (+) COPD, asthma, shortness of breath, sleep apnea,   Cardiovascular   Exercise tolerance: poor (<4 METS)    ECG reviewed  Rhythm: regular  Rate: normal    (+) hypertension, CAD, cardiac stents hyperlipidemia      Neuro/Psych  (+) CVA, headaches, dizziness/light headedness, numbness, psychiatric history,    GI/Hepatic/Renal/Endo    (+) obesity,  GERD, diabetes mellitus,     Musculoskeletal     (+) back pain,   Abdominal    Substance History      OB/GYN          Other   (+) arthritis                                     Anesthesia Plan    ASA 4     MAC     intravenous induction   Anesthetic plan and risks discussed with patient.

## 2017-06-12 NOTE — PERIOPERATIVE NURSING NOTE
"1240-NOTIFIED ANTONIO SKELTON OF [facility] AND PATIENT REPORTS  THIS MORNING BEFORE TAKING SLIDING SCALE INSULIN. SYDNI DAVIDSON CRNA STATES \"OK, NO TREATMENT\". CRNA REQUESTS CHECKING [facility] BEFORE DISCHARGE.  "

## 2017-06-12 NOTE — OP NOTE
PROCEDURE NOTE      Date of Procedure: 6/12/2017  Patient Name:  Denzel Harding  MRN: [id number]  YOB: 1960      PREOPERATIVE DIAGNOSIS:  Visually significant combined form of senile cataract of the right eye interfering with activities of daily living.    INDICATIONS FOR THE PROCEDURE:  Visually significant combined form of senile cataract of the right eye interfering with activities of daily living.    POSTOPERATIVE DIAGNOSIS:  Visually significant combined form of senile cataract of the right eye interfering with activities of daily living.    SURGEON:  Natalie Cao M.D.    NAME OF PROCEDURE:  right cataract extraction with posterior chamber intraocular lens implant. Lens used: 16.5D SA60WF  CDE: 6.17    ANESTHESIA:  Topical with MAC.    COMPLICATIONS:  None.    DETAILS OF THE PROCEDURE:  After receiving appropriate informed consent and confirming and marking the eye to be operated upon, the patient was wheeled into the operating room. After confirming adequate anesthesia, the patient was prepped and draped in a standard sterile ophthalmic fashion. A lid speculum was then placed in the eye.  A 0.8 mm metal blade was then used to make two limbal paracenteses and the anterior chamber was reformed with Viscoat.  A 2.4 mm metal keratome was then used to make a temporal clear cornea tunneled incision.  A cystotome was used to puncture the anterior capsule and initiate a capsular flap.  Utrata forceps were used to complete a continuous curvilinear capsulorrhexis which kept trying to run out as the lens looked very globular.  BSS on a Daly hydrodissection cannula was then used to hydrodissect and hydrodelineate the nucleus.  The nucleus was then phacoemulsified using a stop and chop technique.  CDE was 6.17%.  Remaining cortex was removed with bimanual I/A.  Posterior capsular polish was performed.  The capsular bag was then reformed with Healon-GV and a +16.5 D SA60WF lens implant, was then placed in  the bag without event.  The Healon-GV was then removed with bimanual irrigation and aspiration and the anterior chamber reformed with BSS.  The wounds were hydrated as necessary to maintain a water tight anterior chamber. Intracameral Moxifloxacin 1mg/0.1ml was administered and 5% povidone iodine solution was placed on the ocular surface. At the conclusion of the procedure, the lid speculum was removed and the patient undraped.  A drop of Vigamox, diclofenac and Pred Forte 1% and shield were placed over the eye.  The patient left the room in good condition having tolerated the procedure well.    Natalie Cao MD

## 2017-06-12 NOTE — DISCHARGE INSTRUCTIONS
Start eyedrops today as soon as home. Pred Forte 1% one drop q4h vigamox q8h and Diclofenac one drop every 4 hours. Wait 5 mins between drops. Keep shield on at all times until office visit tmrw.[facility] [address] [place] [address]  PH: [phone]  FAX: [phone]    Post - Operative Instructions for [facility] Catar[facility] Patients    1. Use your drops as prescribed, wait 5 minutes between each drop.  2. Securely tape the protective shield over the operated eye at bedtime for the  FIRST week only.  3. Take your regular non-eye medications and continue any eye medications for  the non-operative eye.  4. For the first week, avoid bending or stooping and lifting anything heavier than 5  pounds.  Also, avoid any blow to the head or eye.  Avoid getting dirty water in  the eye.  Avoid sleeping on the side of the operated eye or face.  5. No driving until you are told to by your physician.  6. If significant eye pain is not relieved by medication, or you have increased  redness, swelling, pain or loss of vision or any symptoms you are unsure of call  Dr. Cao's office at [phone].    You should expect:    Slight Discomfort  Burning When Using Eye Drops  Blurred Vision and Dilated Pupil  Mild Redness  You Will Not Be Able To Read  Your Glasses May Not Work

## 2017-06-19 LAB
BH CV ECHO MEAS - % IVS THICK: -2.4 %
BH CV ECHO MEAS - % LVPW THICK: 23.3 %
BH CV ECHO MEAS - AO MAX PG (FULL): 0.34 MMHG
BH CV ECHO MEAS - AO MAX PG: 4.6 MMHG
BH CV ECHO MEAS - AO MEAN PG (FULL): 1 MMHG
BH CV ECHO MEAS - AO MEAN PG: 3 MMHG
BH CV ECHO MEAS - AO ROOT AREA (BSA CORRECTED): 1.6
BH CV ECHO MEAS - AO ROOT AREA: 10.8 CM^2
BH CV ECHO MEAS - AO ROOT DIAM: 3.7 CM
BH CV ECHO MEAS - AO V2 MAX: 107.5 CM/SEC
BH CV ECHO MEAS - AO V2 MEAN: 76.9 CM/SEC
BH CV ECHO MEAS - AO V2 VTI: 24.3 CM
BH CV ECHO MEAS - AVA(I,A): 4.6 CM^2
BH CV ECHO MEAS - AVA(I,D): 4.6 CM^2
BH CV ECHO MEAS - AVA(V,A): 4.4 CM^2
BH CV ECHO MEAS - AVA(V,D): 4.4 CM^2
BH CV ECHO MEAS - BSA(HAYCOCK): 2.4 M^2
BH CV ECHO MEAS - BSA: 2.3 M^2
BH CV ECHO MEAS - BZI_BMI: 36.2 KILOGRAMS/M^2
BH CV ECHO MEAS - BZI_METRIC_HEIGHT: 177.8 CM
BH CV ECHO MEAS - BZI_METRIC_WEIGHT: 114.3 KG
BH CV ECHO MEAS - CONTRAST EF 4CH: 59.9 ML/M^2
BH CV ECHO MEAS - EDV(CUBED): 68.9 ML
BH CV ECHO MEAS - EDV(MOD-SP4): 172 ML
BH CV ECHO MEAS - EDV(TEICH): 74.2 ML
BH CV ECHO MEAS - EF(CUBED): 77.3 %
BH CV ECHO MEAS - EF(MOD-SP4): 59.9 %
BH CV ECHO MEAS - EF(TEICH): 69.9 %
BH CV ECHO MEAS - ESV(CUBED): 15.6 ML
BH CV ECHO MEAS - ESV(MOD-SP4): 69 ML
BH CV ECHO MEAS - ESV(TEICH): 22.3 ML
BH CV ECHO MEAS - FS: 39 %
BH CV ECHO MEAS - IVS/LVPW: 1.4
BH CV ECHO MEAS - IVSD: 1.3 CM
BH CV ECHO MEAS - IVSS: 2.1 CM
BH CV ECHO MEAS - LA DIMENSION: 3.8 CM
BH CV ECHO MEAS - LA/AO: 1.2
BH CV ECHO MEAS - LV DIASTOLIC VOL/BSA (35-75): 74.7 ML/M^2
BH CV ECHO MEAS - LV MASS(C)D: 323.1 GRAMS
BH CV ECHO MEAS - LV MASS(C)DI: 140.3 GRAMS/M^2
BH CV ECHO MEAS - LV MASS(C)S: 205.7 GRAMS
BH CV ECHO MEAS - LV MASS(C)SI: 89.3 GRAMS/M^2
BH CV ECHO MEAS - LV MAX PG: 4.3 MMHG
BH CV ECHO MEAS - LV MEAN PG: 2 MMHG
BH CV ECHO MEAS - LV SYSTOLIC VOL/BSA (12-30): 30 ML/M^2
BH CV ECHO MEAS - LV V1 MAX: 103.5 CM/SEC
BH CV ECHO MEAS - LV V1 MEAN: 67.4 CM/SEC
BH CV ECHO MEAS - LV V1 VTI: 24.7 CM
BH CV ECHO MEAS - LVIDD: 4.1 CM
BH CV ECHO MEAS - LVIDS: 2.5 CM
BH CV ECHO MEAS - LVLD AP4: 9.3 CM
BH CV ECHO MEAS - LVLS AP4: 8.1 CM
BH CV ECHO MEAS - LVOT AREA (M): 4.5 CM^2
BH CV ECHO MEAS - LVOT AREA: 4.5 CM^2
BH CV ECHO MEAS - LVOT DIAM: 2.4 CM
BH CV ECHO MEAS - LVPWD: 1.3 CM
BH CV ECHO MEAS - LVPWS: 1.9 CM
BH CV ECHO MEAS - MV A MAX VEL: 78.4 CM/SEC
BH CV ECHO MEAS - MV DEC SLOPE: 434 CM/SEC^2
BH CV ECHO MEAS - MV DEC TIME: 0.24 SEC
BH CV ECHO MEAS - MV E MAX VEL: 93.5 CM/SEC
BH CV ECHO MEAS - MV E/A: 1.2
BH CV ECHO MEAS - MV P1/2T MAX VEL: 93.5 CM/SEC
BH CV ECHO MEAS - MV P1/2T: 63.1 MSEC
BH CV ECHO MEAS - MVA P1/2T LCG: 2.4 CM^2
BH CV ECHO MEAS - MVA(P1/2T): 3.5 CM^2
BH CV ECHO MEAS - PA MAX PG: 2.5 MMHG
BH CV ECHO MEAS - PA V2 MAX: 78.4 CM/SEC
BH CV ECHO MEAS - RAP SYSTOLE: 10 MMHG
BH CV ECHO MEAS - RVAW: 1.1 CM
BH CV ECHO MEAS - RVDD: 2.9 CM
BH CV ECHO MEAS - RVSP: 15.5 MMHG
BH CV ECHO MEAS - SI(AO): 113.5 ML/M^2
BH CV ECHO MEAS - SI(CUBED): 23.1 ML/M^2
BH CV ECHO MEAS - SI(LVOT): 48.5 ML/M^2
BH CV ECHO MEAS - SI(MOD-SP4): 44.7 ML/M^2
BH CV ECHO MEAS - SI(TEICH): 22.5 ML/M^2
BH CV ECHO MEAS - SV(AO): 261.3 ML
BH CV ECHO MEAS - SV(CUBED): 53.3 ML
BH CV ECHO MEAS - SV(LVOT): 111.7 ML
BH CV ECHO MEAS - SV(MOD-SP4): 103 ML
BH CV ECHO MEAS - SV(TEICH): 51.9 ML
BH CV ECHO MEAS - TR MAX VEL: 117 CM/SEC
BH CV ECHO MEAS - TV MAX PG: 1.7 MMHG
BH CV ECHO MEAS - TV V2 MAX: 65.3 CM/SEC
LEFT ATRIUM VOLUME INDEX: 28 ML/M2
LV EF 2D ECHO EST: 60 %

## 2017-06-29 ENCOUNTER — APPOINTMENT (OUTPATIENT)
Dept: NUCLEAR MEDICINE | Facility: HOSPITAL | Age: 57
End: 2017-06-29
Attending: INTERNAL MEDICINE

## 2017-07-03 ENCOUNTER — HOSPITAL ENCOUNTER (OUTPATIENT)
Dept: NUCLEAR MEDICINE | Facility: HOSPITAL | Age: 57
Discharge: HOME | End: 2017-07-03
Attending: INTERNAL MEDICINE

## 2017-07-03 ENCOUNTER — HOSPITAL ENCOUNTER (OUTPATIENT)
Dept: GENERAL RADIOLOGY | Facility: HOSPITAL | Age: 57
Discharge: HOME | End: 2017-07-03
Attending: INTERNAL MEDICINE

## 2017-07-03 PROCEDURE — 93018 CV STRESS TEST I&R ONLY: CPT | Performed by: INTERNAL MEDICINE

## 2017-07-03 PROCEDURE — 78452 HT MUSCLE IMAGE SPECT MULT: CPT | Performed by: INTERNAL MEDICINE

## 2017-07-03 PROCEDURE — 78452 HT MUSCLE IMAGE SPECT MULT: CPT

## 2017-07-03 PROCEDURE — A9500 TC99M SESTAMIBI: HCPCS | Performed by: INTERNAL MEDICINE

## 2017-07-03 PROCEDURE — 93017 CV STRESS TEST TRACING ONLY: CPT

## 2017-07-03 PROCEDURE — 25010000002 REGADENOSON 0.4 MG/5ML SOLUTION: Performed by: INTERNAL MEDICINE

## 2017-07-03 PROCEDURE — 0 TECHNETIUM SESTAMIBI: Performed by: INTERNAL MEDICINE

## 2017-07-03 RX ADMIN — Medication 1 DOSE: at 07:00

## 2017-07-03 RX ADMIN — Medication 1 DOSE: at 09:00

## 2017-07-03 RX ADMIN — REGADENOSON 0.4 MG: 0.08 INJECTION, SOLUTION INTRAVENOUS at 09:00

## 2017-07-07 LAB
BH CV NUCLEAR PRIOR STUDY: 3
BH CV STRESS COMMENTS STAGE 1: NORMAL
BH CV STRESS DOSE REGADENOSON STAGE 1: 0.4
BH CV STRESS DURATION MIN STAGE 1: 0
BH CV STRESS DURATION SEC STAGE 1: 15
BH CV STRESS PROTOCOL 1: NORMAL
BH CV STRESS RECOVERY BP: NORMAL MMHG
BH CV STRESS RECOVERY HR: 71 BPM
BH CV STRESS STAGE 1: 1
LV EF NUC BP: 65 %
MAXIMAL PREDICTED HEART RATE: 164 BPM
PERCENT MAX PREDICTED HR: 46.95 %
STRESS BASELINE BP: NORMAL MMHG
STRESS BASELINE HR: 67 BPM
STRESS PERCENT HR: 55 %
STRESS POST PEAK BP: NORMAL MMHG
STRESS POST PEAK HR: 77 BPM
STRESS TARGET HR: 139 BPM

## 2017-07-09 NOTE — PROGRESS NOTES
Subjective:     Encounter Date:06/12/2017      Patient ID: Denzel Harding is a 57 y.o. male.    Chief Complaint: Chest pain and shortness of breath  HPI  Is a 57-year-old male patient with known coronary artery disease who underwent a drug-eluting stent to the mid left anterior descending in 2002 in [place].  The patient had a follow-up cardiac catheterization 2-3 years ago for recurrent chest discomfort and was demonstrated to have a importance stenosis in the ostium of a diagonal branch.  The cardiologist at that time recommended intensification of medical therapy and lifestyle modification.  The patient has done reasonably well over the last one year except in April and May he was hospitalized on 2 occasions for recurrent chest discomfort.  He ruled out for myocardial infarction and was instructed to follow-up with his regular cardiologist.  The patient indicates that he underwent cataract surgery this morning.  He reports here today to follow-up on his heart.  The patient indicates that his chest discomfort on both instances occurred while at rest and lasted for over 30 minutes.  It was associated with shortness of breath nausea and diaphoresis but did not radiate.  The patient indicates that the quality was similar to that which she was experiencing in 2014.  He cannot recall the quality of the chest discomfort he was experiencing in 2002.  The patient has a history of hypertension diabetes and hypercholesterolemia.  He has apparently recently had an episode of DKA.  He reports having shortness of breath both at rest and with activity.  He no longer smokes.  He has some swelling in his feet and ankles particularly if she's been on his feet for prolonged periods of time.  There is no orthopnea or PND.  He has no dizziness palpitations or syncope.  He is unable to do treadmill exercise stress testing due to difficulty with ambulation (he ambulates with a cane), poor effort tolerance obesity and visual  difficulties.  The patient is noticed to be very unsteady on gait walking to the exam room.  The remainder of his past medical history, family history and social history are unchanged compared to his last visit.  The following portions of the patient's history were reviewed and updated as appropriate: allergies, current medications, past family history, past medical history, past social history, past surgical history and problem  Review of Systems   Constitution: Negative for chills, diaphoresis, fever, malaise/fatigue, weight gain and weight loss.   HENT: Negative for ear discharge, hearing loss, hoarse voice and nosebleeds.    Eyes: Negative for discharge, double vision, pain and photophobia.   Cardiovascular: Positive for chest pain, dyspnea on exertion and leg swelling. Negative for claudication, cyanosis, irregular heartbeat, near-syncope, orthopnea, palpitations, paroxysmal nocturnal dyspnea and syncope.   Respiratory: Positive for shortness of breath. Negative for cough, hemoptysis, sputum production and wheezing.    Endocrine: Negative for cold intolerance, heat intolerance, polydipsia, polyphagia and polyuria.   Hematologic/Lymphatic: Negative for adenopathy and bleeding problem. Does not bruise/bleed easily.   Skin: Negative for color change, flushing, itching and rash.   Musculoskeletal: Negative for muscle cramps, muscle weakness, myalgias and stiffness.   Gastrointestinal: Negative for abdominal pain, diarrhea, hematemesis, hematochezia, nausea and vomiting.   Genitourinary: Negative for dysuria, frequency and nocturia.   Neurological: Positive for dizziness. Negative for focal weakness, loss of balance, numbness, paresthesias and seizures.   Psychiatric/Behavioral: Negative for altered mental status, hallucinations and suicidal ideas.   Allergic/Immunologic: Negative for HIV exposure, hives and persistent infections.           Current Outpatient Prescriptions:   •  albuterol (PROVENTIL HFA;VENTOLIN  HFA) 108 (90 BASE) MCG/ACT inhaler, Inhale 2 puffs Every 4 (Four) Hours As Needed. ProAir  (90 Base) MCG/ACT Inhalation Aerosol Solution; Patient Sig: ProAir  (90 Base) MCG/ACT Inhalation Aerosol Solution ; 8; 0; 05-Oct-2015; Active, Disp: , Rfl:   •  amLODIPine (NORVASC) 2.5 MG tablet, Take 1 tablet by mouth daily., Disp: , Rfl:   •  aspirin 81 MG tablet, Take 1 tablet by mouth daily., Disp: , Rfl:   •  atenolol (TENORMIN) 25 MG tablet, Take 1 tablet by mouth daily., Disp: , Rfl:   •  Cetirizine HCl 10 MG capsule, Take 1 capsule by mouth daily., Disp: , Rfl:   •  clopidogrel (PLAVIX) 75 MG tablet, Take 1 tablet by mouth daily., Disp: , Rfl:   •  diclofenac (VOLTAREN) 0.1 % ophthalmic solution, INSTILL 1 DROP INTO RIGHT EYE 4 TIMES DAILY, Disp: , Rfl: 0  •  furosemide (LASIX) 40 MG tablet, Take 1 tablet by mouth daily., Disp: , Rfl:   •  gabapentin (NEURONTIN) 300 MG capsule, Take 300 mg by mouth 3 (Three) Times a Day., Disp: , Rfl:   •  HUMALOG KWIKPEN 100 UNIT/ML solution pen-injector, Inject 1 dose as directed Take As Directed., Disp: , Rfl: 0  •  lisinopril (PRINIVIL,ZESTRIL) 40 MG tablet, Take 1 tablet by mouth daily., Disp: , Rfl:   •  montelukast (SINGULAIR) 10 MG tablet, TAKE 1 TABLET BY MOUTH AT NIGHT, Disp: 30 tablet, Rfl: 2  •  omeprazole (PriLOSEC) 20 MG capsule, Take 1 capsule by mouth daily., Disp: , Rfl:   •  potassium chloride (K-DUR,KLOR-CON) 20 MEQ CR tablet, Take 1 tablet by mouth daily., Disp: , Rfl:   •  prednisoLONE acetate (PRED FORTE) 1 % ophthalmic suspension, INSTILL 1 DROP INTO BOTH EYES 4 TIMES DAILY, Disp: , Rfl: 0  •  promethazine (PHENERGAN) 25 MG tablet, Take 1 tablet by mouth As Needed., Disp: , Rfl: 0  •  simvastatin (ZOCOR) 20 MG tablet, Take 1 tablet by mouth daily. In the evening, Disp: , Rfl:   •  topiramate (TOPAMAX) 100 MG tablet, Take 100 mg by mouth Daily., Disp: , Rfl:   •  TOUJEO SOLOSTAR 300 UNIT/ML solution pen-injector, Inject 50 unit marking on U-100  "syringe as directed Daily., Disp: , Rfl: 0  •  Vortioxetine HBr (TRINTELLIX) 10 MG tablet, Take 10 mg by mouth Daily., Disp: , Rfl:   •  Insulin Pen Needle 31G X 5 MM misc, , Disp: , Rfl:   No current facility-administered medications for this visit.      Objective:     Physical Exam   Constitutional: He is oriented to person, place, and time. He appears well-developed and well-nourished.   HENT:   Head: Normocephalic and atraumatic.   Eyes: Conjunctivae are normal. No scleral icterus.   Cardiovascular: Normal rate, regular rhythm, normal heart sounds and intact distal pulses.  Exam reveals no gallop and no friction rub.    No murmur heard.  Pulmonary/Chest: Effort normal and breath sounds normal. No respiratory distress.   Abdominal: Soft. Bowel sounds are normal. There is no tenderness.   Musculoskeletal: He exhibits no edema.   Neurological: He is alert and oriented to person, place, and time.   Skin: Skin is warm and dry.   Psychiatric: He has a normal mood and affect. His behavior is normal.     Blood pressure 92/60, pulse 68, resp. rate 16, height 70\" (177.8 cm), weight 252 lb (114 kg), SpO2 99 %.   Lab Review:       Assessment:         1. Coronary artery disease involving native coronary artery of native heart with unstable angina pectoris  The patient has had 2 bouts of unstable angina in the last 3 months both of which prompted hospitalization.  In both cases the patient ruled out for myocardial infarction but no inpatient testing was performed.  He has known coronary artery disease and multiple risk factors for atherosclerosis progression.  He is unable to do treadmill exercise stress testing for the reasons described above.  - Stress Test With Myocardial Perfusion One Day  - Adult Transthoracic Echo Complete    2. Essential hypertension  His blood pressure was somewhat labile and he is actually been having relatively low blood pressure over the last month or so.  - Adult Transthoracic Echo " Beatriz Garcia) Complete  Procedures     Plan:       I have recommended a vasodilator nuclear stress test and an echocardiogram.  No changes to his medication profile have been made at today's visit.  Further recommendations will be predicated on the results of his outpatient testing.

## 2017-07-10 ENCOUNTER — HOSPITAL ENCOUNTER (OUTPATIENT)
Facility: HOSPITAL | Age: 57
Setting detail: HOSPITAL OUTPATIENT SURGERY
Discharge: HOME | End: 2017-07-10
Attending: OPHTHALMOLOGY | Admitting: OPHTHALMOLOGY

## 2017-07-10 ENCOUNTER — ANESTHESIA EVENT (OUTPATIENT)
Dept: PERIOP | Facility: HOSPITAL | Age: 57
End: 2017-07-10

## 2017-07-10 ENCOUNTER — ANESTHESIA (OUTPATIENT)
Dept: PERIOP | Facility: HOSPITAL | Age: 57
End: 2017-07-10

## 2017-07-10 VITALS
DIASTOLIC BLOOD PRESSURE: 80 MMHG | TEMPERATURE: 97.7 F | HEIGHT: 70 IN | WEIGHT: 260 LBS | SYSTOLIC BLOOD PRESSURE: 144 MMHG | BODY MASS INDEX: 37.22 KG/M2 | HEART RATE: 54 BPM | RESPIRATION RATE: 18 BRPM | OXYGEN SATURATION: 98 %

## 2017-07-10 LAB — GLUCOSE BLDC GLUCOMTR-MCNC: 283 MG/DL (ref 70–130)

## 2017-07-10 PROCEDURE — 82962 GLUCOSE BLOOD TEST: CPT

## 2017-07-10 PROCEDURE — 25010000002 MIDAZOLAM PER 1 MG: Performed by: NURSE ANESTHETIST, CERTIFIED REGISTERED

## 2017-07-10 PROCEDURE — 25010000002 EPINEPHRINE 1 MG/ML SOLUTION 1 ML VIAL: Performed by: OPHTHALMOLOGY

## 2017-07-10 PROCEDURE — V2632 POST CHMBR INTRAOCULAR LENS: HCPCS | Performed by: OPHTHALMOLOGY

## 2017-07-10 DEVICE — LENS ACRYSOF IQ 6X13MM SA60WF 16.5: Type: IMPLANTABLE DEVICE | Site: POSTERIOR CHAMBER | Status: FUNCTIONAL

## 2017-07-10 RX ORDER — CEFAZOLIN SODIUM 1 G/3ML
INJECTION, POWDER, FOR SOLUTION INTRAMUSCULAR; INTRAVENOUS
Status: DISCONTINUED
Start: 2017-07-10 | End: 2017-07-10 | Stop reason: WASHOUT

## 2017-07-10 RX ORDER — MOXIFLOXACIN 5 MG/ML
1 SOLUTION/ DROPS OPHTHALMIC EVERY 6 HOURS
Status: DISCONTINUED | OUTPATIENT
Start: 2017-07-10 | End: 2017-07-10 | Stop reason: HOSPADM

## 2017-07-10 RX ORDER — WATER 1000 ML/1000ML
INJECTION, SOLUTION INTRAVENOUS
Status: DISCONTINUED
Start: 2017-07-10 | End: 2017-07-10 | Stop reason: WASHOUT

## 2017-07-10 RX ORDER — MOXIFLOXACIN 5 MG/ML
SOLUTION/ DROPS OPHTHALMIC
Status: DISCONTINUED
Start: 2017-07-10 | End: 2017-07-10 | Stop reason: HOSPADM

## 2017-07-10 RX ORDER — TETRACAINE HYDROCHLORIDE 5 MG/ML
SOLUTION OPHTHALMIC
Status: DISCONTINUED
Start: 2017-07-10 | End: 2017-07-10 | Stop reason: HOSPADM

## 2017-07-10 RX ORDER — TETRACAINE HYDROCHLORIDE 5 MG/ML
SOLUTION OPHTHALMIC
Status: COMPLETED
Start: 2017-07-10 | End: 2017-07-10

## 2017-07-10 RX ORDER — MOXIFLOXACIN 5 MG/ML
SOLUTION/ DROPS OPHTHALMIC AS NEEDED
Status: DISCONTINUED | OUTPATIENT
Start: 2017-07-10 | End: 2017-07-10 | Stop reason: HOSPADM

## 2017-07-10 RX ORDER — TETRACAINE HYDROCHLORIDE 5 MG/ML
SOLUTION OPHTHALMIC AS NEEDED
Status: DISCONTINUED | OUTPATIENT
Start: 2017-07-10 | End: 2017-07-10 | Stop reason: HOSPADM

## 2017-07-10 RX ORDER — MIDAZOLAM HYDROCHLORIDE 1 MG/ML
INJECTION INTRAMUSCULAR; INTRAVENOUS AS NEEDED
Status: DISCONTINUED | OUTPATIENT
Start: 2017-07-10 | End: 2017-07-10 | Stop reason: SURG

## 2017-07-10 RX ORDER — SODIUM CHLORIDE, SODIUM LACTATE, POTASSIUM CHLORIDE, CALCIUM CHLORIDE 600; 310; 30; 20 MG/100ML; MG/100ML; MG/100ML; MG/100ML
1000 INJECTION, SOLUTION INTRAVENOUS CONTINUOUS PRN
Status: DISCONTINUED | OUTPATIENT
Start: 2017-07-10 | End: 2017-07-10 | Stop reason: HOSPADM

## 2017-07-10 RX ORDER — TETRACAINE HYDROCHLORIDE 5 MG/ML
1 SOLUTION OPHTHALMIC SEE ADMIN INSTRUCTIONS
Status: COMPLETED | OUTPATIENT
Start: 2017-07-10 | End: 2017-07-10

## 2017-07-10 RX ORDER — BALANCED SALT SOLUTION ENRICHED WITH BICARBONATE, DEXTROSE, AND GLUTATHIONE
KIT INTRAOCULAR AS NEEDED
Status: DISCONTINUED | OUTPATIENT
Start: 2017-07-10 | End: 2017-07-10 | Stop reason: HOSPADM

## 2017-07-10 RX ORDER — BALANCED SALT SOLUTION ENRICHED WITH BICARBONATE, DEXTROSE, AND GLUTATHIONE
KIT INTRAOCULAR
Status: DISCONTINUED
Start: 2017-07-10 | End: 2017-07-10 | Stop reason: HOSPADM

## 2017-07-10 RX ORDER — BALANCED SALT SOLUTION 6.4; .75; .48; .3; 3.9; 1.7 MG/ML; MG/ML; MG/ML; MG/ML; MG/ML; MG/ML
SOLUTION OPHTHALMIC
Status: DISCONTINUED
Start: 2017-07-10 | End: 2017-07-10 | Stop reason: HOSPADM

## 2017-07-10 RX ORDER — PREDNISOLONE ACETATE 10 MG/ML
SUSPENSION/ DROPS OPHTHALMIC AS NEEDED
Status: DISCONTINUED | OUTPATIENT
Start: 2017-07-10 | End: 2017-07-10 | Stop reason: HOSPADM

## 2017-07-10 RX ORDER — PREDNISOLONE ACETATE 10 MG/ML
SUSPENSION/ DROPS OPHTHALMIC
Status: DISCONTINUED
Start: 2017-07-10 | End: 2017-07-10 | Stop reason: HOSPADM

## 2017-07-10 RX ORDER — EPINEPHRINE 1 MG/ML
INJECTION INTRAMUSCULAR; INTRAVENOUS; SUBCUTANEOUS
Status: DISCONTINUED
Start: 2017-07-10 | End: 2017-07-10 | Stop reason: HOSPADM

## 2017-07-10 RX ORDER — LIDOCAINE HYDROCHLORIDE 20 MG/ML
INJECTION, SOLUTION EPIDURAL; INFILTRATION; INTRACAUDAL; PERINEURAL
Status: DISCONTINUED
Start: 2017-07-10 | End: 2017-07-10 | Stop reason: HOSPADM

## 2017-07-10 RX ORDER — BUPIVACAINE HYDROCHLORIDE 7.5 MG/ML
INJECTION, SOLUTION EPIDURAL; RETROBULBAR
Status: DISCONTINUED
Start: 2017-07-10 | End: 2017-07-10 | Stop reason: HOSPADM

## 2017-07-10 RX ORDER — SODIUM CHLORIDE 0.9 % (FLUSH) 0.9 %
3 SYRINGE (ML) INJECTION AS NEEDED
Status: DISCONTINUED | OUTPATIENT
Start: 2017-07-10 | End: 2017-07-10 | Stop reason: HOSPADM

## 2017-07-10 RX ORDER — POLYMYXIN B SULFATE AND TRIMETHOPRIM 1; 10000 MG/ML; [USP'U]/ML
SOLUTION OPHTHALMIC AS NEEDED
Status: DISCONTINUED | OUTPATIENT
Start: 2017-07-10 | End: 2017-07-10 | Stop reason: HOSPADM

## 2017-07-10 RX ORDER — CYCLOPENTOLATE HYDROCHLORIDE 20 MG/ML
1 SOLUTION/ DROPS OPHTHALMIC
Status: COMPLETED | OUTPATIENT
Start: 2017-07-10 | End: 2017-07-10

## 2017-07-10 RX ORDER — DICLOFENAC SODIUM 1 MG/ML
SOLUTION/ DROPS OPHTHALMIC
Status: DISCONTINUED
Start: 2017-07-10 | End: 2017-07-10 | Stop reason: WASHOUT

## 2017-07-10 RX ORDER — PREDNISOLONE ACETATE 10 MG/ML
1 SUSPENSION/ DROPS OPHTHALMIC
Status: DISCONTINUED | OUTPATIENT
Start: 2017-07-10 | End: 2017-07-10 | Stop reason: HOSPADM

## 2017-07-10 RX ORDER — PHENYLEPHRINE HYDROCHLORIDE 100 MG/ML
SOLUTION/ DROPS OPHTHALMIC
Status: COMPLETED
Start: 2017-07-10 | End: 2017-07-10

## 2017-07-10 RX ORDER — PHENYLEPHRINE HYDROCHLORIDE 100 MG/ML
1 SOLUTION/ DROPS OPHTHALMIC
Status: COMPLETED | OUTPATIENT
Start: 2017-07-10 | End: 2017-07-10

## 2017-07-10 RX ORDER — CYCLOPENTOLATE HYDROCHLORIDE 20 MG/ML
SOLUTION/ DROPS OPHTHALMIC
Status: COMPLETED
Start: 2017-07-10 | End: 2017-07-10

## 2017-07-10 RX ORDER — METHYLPREDNISOLONE SODIUM SUCCINATE 125 MG/2ML
INJECTION, POWDER, LYOPHILIZED, FOR SOLUTION INTRAMUSCULAR; INTRAVENOUS
Status: DISCONTINUED
Start: 2017-07-10 | End: 2017-07-10 | Stop reason: HOSPADM

## 2017-07-10 RX ADMIN — MIDAZOLAM HYDROCHLORIDE 1 MG: 1 INJECTION, SOLUTION INTRAMUSCULAR; INTRAVENOUS at 07:53

## 2017-07-10 RX ADMIN — PHENYLEPHRINE HYDROCHLORIDE 1 DROP: 100 SOLUTION/ DROPS OPHTHALMIC at 06:51

## 2017-07-10 RX ADMIN — PHENYLEPHRINE HYDROCHLORIDE 1 DROP: 100 SOLUTION/ DROPS OPHTHALMIC at 06:56

## 2017-07-10 RX ADMIN — CYCLOPENTOLATE HYDROCHLORIDE 1 DROP: 20 SOLUTION/ DROPS OPHTHALMIC at 06:56

## 2017-07-10 RX ADMIN — CYCLOPENTOLATE HYDROCHLORIDE 1 DROP: 20 SOLUTION/ DROPS OPHTHALMIC at 06:51

## 2017-07-10 RX ADMIN — TETRACAINE HYDROCHLORIDE 1 DROP: 5 SOLUTION OPHTHALMIC at 06:50

## 2017-07-10 RX ADMIN — TETRACAINE HYDROCHLORIDE 1 DROP: 5 SOLUTION OPHTHALMIC at 06:48

## 2017-07-10 RX ADMIN — TETRACAINE HYDROCHLORIDE 1 DROP: 5 SOLUTION OPHTHALMIC at 06:49

## 2017-07-10 RX ADMIN — CYCLOPENTOLATE HYDROCHLORIDE 1 DROP: 20 SOLUTION/ DROPS OPHTHALMIC at 06:48

## 2017-07-10 RX ADMIN — SODIUM CHLORIDE, POTASSIUM CHLORIDE, SODIUM LACTATE AND CALCIUM CHLORIDE: 600; 310; 30; 20 INJECTION, SOLUTION INTRAVENOUS at 07:10

## 2017-07-10 RX ADMIN — PHENYLEPHRINE HYDROCHLORIDE 1 DROP: 100 SOLUTION/ DROPS OPHTHALMIC at 07:01

## 2017-07-10 RX ADMIN — MIDAZOLAM HYDROCHLORIDE 1 MG: 1 INJECTION, SOLUTION INTRAMUSCULAR; INTRAVENOUS at 08:01

## 2017-07-10 NOTE — ANESTHESIA POSTPROCEDURE EVALUATION
Patient: Denzel Harding    Procedure Summary     Date Anesthesia Start Anesthesia Stop Room / Location    07/10/17 0751 0818  [facility]       Procedure Diagnosis Surgeon Provider    CATARACT PHACO EXTRACTION WITH INTRAOCULAR LENS IMPLANT LEFT (Left Eye) No diagnosis on file. MD Kaley Norton CRNA          Anesthesia Type: MAC  Last vitals  /80 (07/10/17 0855)    Temp 97.7 °F (36.5 °C) (07/10/17 0825)    Pulse 54 (07/10/17 0855)   Resp 18 (07/10/17 0855)    SpO2 98 % (07/10/17 0855)      Post Anesthesia Care and Evaluation    Patient location during evaluation: PHASE II  Patient participation: complete - patient participated  Level of consciousness: awake and alert  Pain score: 0  Pain management: satisfactory to patient  Airway patency: patent  Anesthetic complications: No anesthetic complications  PONV Status: none  Cardiovascular status: acceptable and stable  Respiratory status: acceptable  Hydration status: acceptable

## 2017-07-10 NOTE — DISCHARGE INSTRUCTIONS
[address] [place] [address]  [place] [address]  PH: [phone]  FAX: [phone]    Post - Operative Instructions for [facility] Patients    1. Use your drops as prescribed, wait 5 minutes between each drop.  2. Securely tape the protective shield over the operated eye at bedtime for the FIRST week only.  3. Take your regular non-eye medications and continue any eye medications for the non-operative eye.  4. For the first week, avoid bending or stooping and lifting anything heavier than 5 pounds.  Also, avoid any blow to the head or eye.  Avoid getting dirty water in the eye.  Avoid sleeping on the side of the operated eye or face.  5. No driving until you are told to by your physician.  6. If significant eye pain is not relieved by medication, or you have increased  redness, swelling, pain or loss of vision or any symptoms you are unsure of call  Dr. Cao's office at [phone].    You should expect:    Slight Discomfort  Burning When Using Eye Drops  Blurred Vision and Dilated Pupil  Mild Redness  You Will Not Be Able To Read  Your Glasses May Not Work[address]  [place], [place], [address]  PH: [phone]  FAX: [phone]  Start eyedrops today as soon as home. Pred Forte 1% one drop q4h vigamox q8h Wait 5 mins between drops. Keep shield on at all times until office visit tmrw.Patient instructed on PAT PASS information.  Patient/family member verbalized understanding of instruction/education.

## 2017-07-10 NOTE — PLAN OF CARE
Problem: Cataract (Adult)  Goal: Signs and Symptoms of Listed Potential Problems Will be Absent or Manageable (Cataract)  Outcome: Ongoing (interventions implemented as appropriate)    07/10/17 0639   Cataract   Problems Assessed (Cataract) all   Problems Present (Cataract) none

## 2017-07-10 NOTE — ANESTHESIA PREPROCEDURE EVALUATION
Anesthesia Evaluation     Patient summary reviewed and Nursing notes reviewed   no history of anesthetic complications:  NPO Solid Status: > 8 hours  NPO Liquid Status: > 8 hours     Airway   Mallampati: III  TM distance: >3 FB  Neck ROM: full  Dental    (+) poor dentition    Pulmonary - normal exam   (+) a smoker (quit in 1998) Former, COPD, asthma, shortness of breath, sleep apnea,   Cardiovascular   Exercise tolerance: poor (<4 METS)    ECG reviewed  Rhythm: regular  Rate: normal    (+) hypertension, CAD, cardiac stents angina, hyperlipidemia      Neuro/Psych  (+) seizures (focal seizures, last one > 5 years ago) well controlled, CVA, headaches, dizziness/light headedness, numbness, psychiatric history Anxiety,    GI/Hepatic/Renal/Endo    (+) obesity,  GERD, diabetes mellitus,     Musculoskeletal     (+) back pain,   Abdominal    Substance History      OB/GYN          Other   (+) arthritis     ROS/Med Hx Other: Normal LVSF. EF of 60-65%. Grade 1 diastolic dysfunction of the LV myocardium. No evidence of pericardial effusion                                  Anesthesia Plan    ASA 3     MAC   (Risks and benefits discussed including risk of aspiration, recall and dental damage. All patient questions answered. Will continue with POC.)  intravenous induction   Anesthetic plan and risks discussed with patient.

## 2017-07-18 ENCOUNTER — OFFICE VISIT (OUTPATIENT)
Dept: CARDIOLOGY | Facility: CLINIC | Age: 57
End: 2017-07-18

## 2017-07-18 VITALS
SYSTOLIC BLOOD PRESSURE: 140 MMHG | BODY MASS INDEX: 38.97 KG/M2 | DIASTOLIC BLOOD PRESSURE: 70 MMHG | RESPIRATION RATE: 18 BRPM | WEIGHT: 272.2 LBS | HEART RATE: 72 BPM | HEIGHT: 70 IN | OXYGEN SATURATION: 99 %

## 2017-07-18 DIAGNOSIS — I20.8 ANGINAL EQUIVALENT (HCC): ICD-10-CM

## 2017-07-18 DIAGNOSIS — R06.09 DOE (DYSPNEA ON EXERTION): ICD-10-CM

## 2017-07-18 DIAGNOSIS — R94.39 ABNORMAL NUCLEAR STRESS TEST: ICD-10-CM

## 2017-07-18 DIAGNOSIS — I25.118 CORONARY ARTERY DISEASE OF NATIVE ARTERY OF NATIVE HEART WITH STABLE ANGINA PECTORIS (HCC): Primary | ICD-10-CM

## 2017-07-18 DIAGNOSIS — R06.02 SHORTNESS OF BREATH: ICD-10-CM

## 2017-07-18 DIAGNOSIS — I10 ESSENTIAL HYPERTENSION: ICD-10-CM

## 2017-07-18 PROBLEM — I20.89 ANGINAL EQUIVALENT: Status: ACTIVE | Noted: 2017-07-18

## 2017-07-18 PROCEDURE — 99214 OFFICE O/P EST MOD 30 MIN: CPT | Performed by: INTERNAL MEDICINE

## 2017-07-18 RX ORDER — AMLODIPINE BESYLATE 2.5 MG/1
5 TABLET ORAL DAILY
Qty: 30 TABLET | Refills: 11 | COMMUNITY
Start: 2017-07-18 | End: 2017-09-05 | Stop reason: HOSPADM

## 2017-07-18 RX ORDER — RANOLAZINE 500 MG/1
500 TABLET, EXTENDED RELEASE ORAL 2 TIMES DAILY
Qty: 60 TABLET | Refills: 11 | Status: SHIPPED | OUTPATIENT
Start: 2017-07-18 | End: 2018-08-30 | Stop reason: SDUPTHER

## 2017-07-18 RX ORDER — TURM/GING/BOS/YUC/WIL/CHAM/HOR 100-100 MG
1 TABLET ORAL DAILY
COMMUNITY
End: 2018-03-02

## 2017-07-18 NOTE — PROGRESS NOTES
Subjective:     Encounter Date:07/18/2017      Patient ID: Denzel Harding is a 57 y.o. male.    Chief Complaint:Shortness of breath and edema  HPI  This is a 57-year-old white male patient with known coronary artery disease who recently underwent outpatient testing to evaluate shortness of breath and chest discomfort.  The patient continues to have shortness of breath with physical activity.  This is associated with easy fatigue and a sense of exhaustion after physical activities.  He has no orthopnea or PND.  He has some swelling of his feet and ankles if she's been on his feet for prolonged periods of time.  There is no dizziness palpitations or syncope.  The patient underwent a vasodilator nuclear stress test which showed a moderate to large area of anterior and anteroseptal as well as apical ischemia.  The patient is known to have a previous LAD stent in 2002 in [place].  He has had a follow-up heart catheterization in 2015 which showed an 80% stenosis in his first diagonal branch.  He was offered intervention at that time but refused.  He is a former smoker but currently does not smoke.  Overall the frequency duration and intensity of his symptoms have gradually progressed since his last visit.  The remainder of his past medical history, family history and social history remains unchanged compared to his last visit.  The following portions of the patient's history were reviewed and updated as appropriate: allergies, current medications, past family history, past medical history, past social history, past surgical history and problem  Review of Systems   Constitution: Negative for chills, diaphoresis, fever, weakness, malaise/fatigue, night sweats, weight gain and weight loss.   HENT: Negative for ear discharge, hearing loss, hoarse voice and nosebleeds.    Eyes: Negative for discharge, double vision, pain and photophobia.   Cardiovascular: Positive for dyspnea on exertion and leg swelling. Negative for  chest pain, claudication, cyanosis, irregular heartbeat, near-syncope, orthopnea, palpitations, paroxysmal nocturnal dyspnea and syncope.   Respiratory: Positive for shortness of breath. Negative for cough, hemoptysis, sputum production and wheezing.    Endocrine: Negative for cold intolerance, heat intolerance, polydipsia, polyphagia and polyuria.   Hematologic/Lymphatic: Negative for adenopathy and bleeding problem. Does not bruise/bleed easily.   Skin: Negative for color change, flushing, itching and rash.   Musculoskeletal: Negative for muscle cramps, muscle weakness, myalgias and stiffness.   Gastrointestinal: Negative for abdominal pain, diarrhea, hematemesis, hematochezia, nausea and vomiting.   Genitourinary: Negative for dysuria, frequency and nocturia.   Neurological: Negative for dizziness, focal weakness, loss of balance, numbness, paresthesias and seizures.   Psychiatric/Behavioral: Negative for altered mental status, hallucinations and suicidal ideas.   Allergic/Immunologic: Negative for HIV exposure, hives and persistent infections.       Current Outpatient Prescriptions:   •  albuterol (PROVENTIL HFA;VENTOLIN HFA) 108 (90 BASE) MCG/ACT inhaler, Inhale 2 puffs Every 4 (Four) Hours As Needed. ProAir  (90 Base) MCG/ACT Inhalation Aerosol Solution; Patient Sig: ProAir  (90 Base) MCG/ACT Inhalation Aerosol Solution ; 8; 0; 05-Oct-2015; Active, Disp: , Rfl:   •  amLODIPine (NORVASC) 2.5 MG tablet, Take 2 tablets by mouth Daily., Disp: 30 tablet, Rfl: 11  •  aspirin 81 MG tablet, Take 1 tablet by mouth daily., Disp: , Rfl:   •  atenolol (TENORMIN) 25 MG tablet, Take 1 tablet by mouth daily., Disp: , Rfl:   •  Cetirizine HCl 10 MG capsule, Take 1 capsule by mouth daily., Disp: , Rfl:   •  clopidogrel (PLAVIX) 75 MG tablet, Take 1 tablet by mouth daily., Disp: , Rfl:   •  furosemide (LASIX) 40 MG tablet, Take 1 tablet by mouth daily., Disp: , Rfl:   •  gabapentin (NEURONTIN) 300 MG capsule, Take  300 mg by mouth 2 (Two) Times a Day., Disp: , Rfl:   •  HUMALOG KWIKPEN 100 UNIT/ML solution pen-injector, Inject 1 dose as directed Take As Directed., Disp: , Rfl: 0  •  lisinopril (PRINIVIL,ZESTRIL) 40 MG tablet, Take 1 tablet by mouth daily., Disp: , Rfl:   •  Misc Natural Products (TUMERSAID) tablet, Take  by mouth Daily., Disp: , Rfl:   •  montelukast (SINGULAIR) 10 MG tablet, TAKE 1 TABLET BY MOUTH AT NIGHT, Disp: 30 tablet, Rfl: 2  •  omeprazole (PriLOSEC) 20 MG capsule, Take 1 capsule by mouth daily., Disp: , Rfl:   •  potassium chloride (K-DUR,KLOR-CON) 20 MEQ CR tablet, Take 1 tablet by mouth daily., Disp: , Rfl:   •  prednisoLONE acetate (PRED FORTE) 1 % ophthalmic suspension, INSTILL 1 DROP INTO BOTH EYES 4 TIMES DAILY, Disp: , Rfl: 0  •  promethazine (PHENERGAN) 25 MG tablet, Take 1 tablet by mouth As Needed., Disp: , Rfl: 0  •  simvastatin (ZOCOR) 20 MG tablet, Take 1 tablet by mouth daily. In the evening, Disp: , Rfl:   •  topiramate (TOPAMAX) 100 MG tablet, Take 100 mg by mouth Daily., Disp: , Rfl:   •  TOUJEO SOLOSTAR 300 UNIT/ML solution pen-injector, Inject 50 unit marking on U-100 syringe as directed Daily., Disp: , Rfl: 0  •  Vortioxetine HBr (TRINTELLIX) 10 MG tablet, Take 10 mg by mouth Daily., Disp: , Rfl:   •  Insulin Pen Needle 31G X 5 MM misc, , Disp: , Rfl:   •  ranolazine (RANEXA) 500 MG 12 hr tablet, Take 1 tablet by mouth 2 (Two) Times a Day., Disp: 60 tablet, Rfl: 11     Objective:     Physical Exam   Constitutional: He is oriented to person, place, and time. He appears well-developed and well-nourished.   HENT:   Head: Normocephalic and atraumatic.   Eyes: Conjunctivae are normal. No scleral icterus.   Cardiovascular: Normal rate, regular rhythm, normal heart sounds and intact distal pulses.  Exam reveals no gallop and no friction rub.    No murmur heard.  Pulmonary/Chest: Effort normal and breath sounds normal. No respiratory distress.   Abdominal: Soft. Bowel sounds are normal.  "There is no tenderness.   Musculoskeletal: He exhibits no edema.   Neurological: He is alert and oriented to person, place, and time.   Skin: Skin is warm and dry.   Psychiatric: He has a normal mood and affect. His behavior is normal.     Blood pressure 140/70, pulse 72, resp. rate 18, height 70\" (177.8 cm), weight 272 lb 3.2 oz (123 kg), SpO2 99 %.   Lab Review:       Assessment:         1. Coronary artery disease of native artery of native heart with stable angina pectoris  The patient continues to have dyspnea with activity as well as exertional fatigue both of which could represent an angina equivalent.  He is known to have coronary artery disease.  He is known to have multiple risk factors for atherosclerosis progression.  He has recently undergone an abnormal vasodilator nuclear stress test.    2. Shortness of breath  History shortness of breath with activity is most likely an angina equivalent.    3. Essential hypertension  Less than ideal blood pressure control.    4. HUTCHINS (dyspnea on exertion)  This appears to be an angina equivalent.  This is particularly suggestive in light of his recent abnormal stress test.    5. Anginal equivalent  His shortness of breath and easy fatigue particularly with fatigue during and after physical activities is highly suggestive of angina pectoris.    6. Abnormal nuclear stress test  The patient has had a moderate to high-risk abnormal stress test showing a moderate to large area of anterior anteroseptal and apical ischemia.  This is in the distribution of known coronary disease with the patient previously having a drug-eluting stent to the mid left anterior descending between the first and second diagonal branches as well as a known 80% stenosis in the first diagonal branch of the left anterior descending.  Procedures     Plan:       I have recommended increasing his amlodipine to 5 mg orally per day.  This will have a dual benefit of helping with angina as well as blood " pressure control.  We have also started him empirically on Ranexa 500 mg orally twice per day.  He is intolerant of nitroglycerin due to severe headaches.  If the patient's symptoms do not improve over the next 4 weeks we will consider the possibility of repeat coronary angiography with interventional standby.  Diet exercise and weight management remain acute component to his overall health.  Further recommendations will be predicated on his response to medical therapy.

## 2017-07-24 RX ORDER — MONTELUKAST SODIUM 10 MG/1
TABLET ORAL
Qty: 30 TABLET | Refills: 2 | OUTPATIENT
Start: 2017-07-24

## 2017-07-26 RX ORDER — GABAPENTIN 300 MG/1
300 CAPSULE ORAL 2 TIMES DAILY
Qty: 60 CAPSULE | Refills: 5 | OUTPATIENT
Start: 2017-07-26 | End: 2018-02-19 | Stop reason: SDUPTHER

## 2017-08-10 ENCOUNTER — APPOINTMENT (OUTPATIENT)
Dept: GENERAL RADIOLOGY | Facility: HOSPITAL | Age: 57
End: 2017-08-10

## 2017-08-10 ENCOUNTER — HOSPITAL ENCOUNTER (OUTPATIENT)
Facility: HOSPITAL | Age: 57
Setting detail: OBSERVATION
Discharge: ANOTHER HOSPITAL | End: 2017-08-11
Attending: EMERGENCY MEDICINE | Admitting: FAMILY MEDICINE

## 2017-08-10 ENCOUNTER — TELEPHONE (OUTPATIENT)
Dept: CARDIOLOGY | Facility: CLINIC | Age: 57
End: 2017-08-10

## 2017-08-10 DIAGNOSIS — I25.110 CORONARY ARTERY DISEASE INVOLVING NATIVE CORONARY ARTERY OF NATIVE HEART WITH UNSTABLE ANGINA PECTORIS (HCC): ICD-10-CM

## 2017-08-10 DIAGNOSIS — R94.39 ABNORMAL STRESS TEST: ICD-10-CM

## 2017-08-10 DIAGNOSIS — R07.9 CHEST PAIN, UNSPECIFIED TYPE: Primary | ICD-10-CM

## 2017-08-10 DIAGNOSIS — I20.8 ANGINAL EQUIVALENT (HCC): ICD-10-CM

## 2017-08-10 LAB
ALBUMIN SERPL-MCNC: 4.3 G/DL (ref 3.5–5)
ALBUMIN/GLOB SERPL: 1.7 G/DL (ref 1–2)
ALP SERPL-CCNC: 61 U/L (ref 38–126)
ALT SERPL W P-5'-P-CCNC: 81 U/L (ref 13–69)
ANION GAP SERPL CALCULATED.3IONS-SCNC: 14.9 MMOL/L
AST SERPL-CCNC: 47 U/L (ref 15–46)
BASOPHILS # BLD AUTO: 0.05 10*3/MM3 (ref 0–0.2)
BASOPHILS NFR BLD AUTO: 0.8 % (ref 0–2.5)
BILIRUB SERPL-MCNC: 0.7 MG/DL (ref 0.2–1.3)
BUN BLD-MCNC: 15 MG/DL (ref 7–20)
BUN/CREAT SERPL: 21.4 (ref 6.3–21.9)
CALCIUM SPEC-SCNC: 9.3 MG/DL (ref 8.4–10.2)
CHLORIDE SERPL-SCNC: 104 MMOL/L (ref 98–107)
CO2 SERPL-SCNC: 24 MMOL/L (ref 26–30)
CREAT BLD-MCNC: 0.7 MG/DL (ref 0.6–1.3)
DEPRECATED RDW RBC AUTO: 37.6 FL (ref 37–54)
EOSINOPHIL # BLD AUTO: 0.23 10*3/MM3 (ref 0–0.7)
EOSINOPHIL NFR BLD AUTO: 3.6 % (ref 0–7)
ERYTHROCYTE [DISTWIDTH] IN BLOOD BY AUTOMATED COUNT: 12.4 % (ref 11.5–14.5)
GFR SERPL CREATININE-BSD FRML MDRD: 117 ML/MIN/1.73
GLOBULIN UR ELPH-MCNC: 2.5 GM/DL
GLUCOSE BLD-MCNC: 214 MG/DL (ref 74–98)
HCT VFR BLD AUTO: 40.8 % (ref 42–52)
HGB BLD-MCNC: 13.8 G/DL (ref 14–18)
HOLD SPECIMEN: NORMAL
HOLD SPECIMEN: NORMAL
IMM GRANULOCYTES # BLD: 0.1 10*3/MM3 (ref 0–0.06)
IMM GRANULOCYTES NFR BLD: 1.6 % (ref 0–0.6)
LYMPHOCYTES # BLD AUTO: 2.19 10*3/MM3 (ref 0.6–3.4)
LYMPHOCYTES NFR BLD AUTO: 34.3 % (ref 10–50)
MAGNESIUM SERPL-MCNC: 1.8 MG/DL (ref 1.6–2.3)
MCH RBC QN AUTO: 27.9 PG (ref 27–31)
MCHC RBC AUTO-ENTMCNC: 33.8 G/DL (ref 30–37)
MCV RBC AUTO: 82.4 FL (ref 80–94)
MONOCYTES # BLD AUTO: 0.49 10*3/MM3 (ref 0–0.9)
MONOCYTES NFR BLD AUTO: 7.7 % (ref 0–12)
NEUTROPHILS # BLD AUTO: 3.32 10*3/MM3 (ref 2–6.9)
NEUTROPHILS NFR BLD AUTO: 52 % (ref 37–80)
NRBC BLD MANUAL-RTO: 0 /100 WBC (ref 0–0)
NT-PROBNP SERPL-MCNC: 35.8 PG/ML (ref 0–125)
PLATELET # BLD AUTO: 193 10*3/MM3 (ref 130–400)
PMV BLD AUTO: 10.8 FL (ref 6–12)
POTASSIUM BLD-SCNC: 3.9 MMOL/L (ref 3.5–5.1)
PROT SERPL-MCNC: 6.8 G/DL (ref 6.3–8.2)
RBC # BLD AUTO: 4.95 10*6/MM3 (ref 4.7–6.1)
SODIUM BLD-SCNC: 139 MMOL/L (ref 137–145)
TROPONIN I SERPL-MCNC: 0 NG/ML (ref 0–0.05)
TROPONIN I SERPL-MCNC: <0.012 NG/ML (ref 0–0.03)
WBC NRBC COR # BLD: 6.38 10*3/MM3 (ref 4.8–10.8)
WHOLE BLOOD HOLD SPECIMEN: NORMAL

## 2017-08-10 PROCEDURE — 71010 HC CHEST PA OR AP: CPT

## 2017-08-10 PROCEDURE — 93005 ELECTROCARDIOGRAM TRACING: CPT | Performed by: EMERGENCY MEDICINE

## 2017-08-10 PROCEDURE — 83880 ASSAY OF NATRIURETIC PEPTIDE: CPT | Performed by: EMERGENCY MEDICINE

## 2017-08-10 PROCEDURE — 80053 COMPREHEN METABOLIC PANEL: CPT | Performed by: EMERGENCY MEDICINE

## 2017-08-10 PROCEDURE — 96361 HYDRATE IV INFUSION ADD-ON: CPT

## 2017-08-10 PROCEDURE — 96375 TX/PRO/DX INJ NEW DRUG ADDON: CPT

## 2017-08-10 PROCEDURE — 25010000002 HYDROMORPHONE PER 4 MG: Performed by: EMERGENCY MEDICINE

## 2017-08-10 PROCEDURE — 25010000002 ONDANSETRON PER 1 MG: Performed by: EMERGENCY MEDICINE

## 2017-08-10 PROCEDURE — 96374 THER/PROPH/DIAG INJ IV PUSH: CPT

## 2017-08-10 PROCEDURE — G0378 HOSPITAL OBSERVATION PER HR: HCPCS

## 2017-08-10 PROCEDURE — 84484 ASSAY OF TROPONIN QUANT: CPT | Performed by: EMERGENCY MEDICINE

## 2017-08-10 PROCEDURE — 99219 PR INITIAL OBSERVATION CARE/DAY 50 MINUTES: CPT | Performed by: FAMILY MEDICINE

## 2017-08-10 PROCEDURE — 99284 EMERGENCY DEPT VISIT MOD MDM: CPT

## 2017-08-10 PROCEDURE — 85025 COMPLETE CBC W/AUTO DIFF WBC: CPT | Performed by: EMERGENCY MEDICINE

## 2017-08-10 PROCEDURE — 83735 ASSAY OF MAGNESIUM: CPT | Performed by: EMERGENCY MEDICINE

## 2017-08-10 RX ORDER — ASPIRIN 81 MG/1
324 TABLET, CHEWABLE ORAL ONCE
Status: COMPLETED | OUTPATIENT
Start: 2017-08-10 | End: 2017-08-10

## 2017-08-10 RX ORDER — SODIUM CHLORIDE 9 MG/ML
75 INJECTION, SOLUTION INTRAVENOUS CONTINUOUS
Status: DISCONTINUED | OUTPATIENT
Start: 2017-08-10 | End: 2017-08-11 | Stop reason: HOSPADM

## 2017-08-10 RX ORDER — ALBUTEROL SULFATE 2.5 MG/3ML
2.5 SOLUTION RESPIRATORY (INHALATION) EVERY 6 HOURS PRN
Status: DISCONTINUED | OUTPATIENT
Start: 2017-08-10 | End: 2017-08-11 | Stop reason: HOSPADM

## 2017-08-10 RX ORDER — ACETAMINOPHEN 325 MG/1
650 TABLET ORAL EVERY 4 HOURS PRN
Status: DISCONTINUED | OUTPATIENT
Start: 2017-08-10 | End: 2017-08-11 | Stop reason: HOSPADM

## 2017-08-10 RX ORDER — AMLODIPINE BESYLATE 5 MG/1
5 TABLET ORAL DAILY
Status: DISCONTINUED | OUTPATIENT
Start: 2017-08-11 | End: 2017-08-11

## 2017-08-10 RX ORDER — SODIUM CHLORIDE 0.9 % (FLUSH) 0.9 %
10 SYRINGE (ML) INJECTION AS NEEDED
Status: DISCONTINUED | OUTPATIENT
Start: 2017-08-10 | End: 2017-08-11 | Stop reason: HOSPADM

## 2017-08-10 RX ORDER — ATENOLOL 50 MG/1
25 TABLET ORAL DAILY
Status: DISCONTINUED | OUTPATIENT
Start: 2017-08-11 | End: 2017-08-11

## 2017-08-10 RX ORDER — CLOPIDOGREL BISULFATE 75 MG/1
75 TABLET ORAL DAILY
Status: DISCONTINUED | OUTPATIENT
Start: 2017-08-11 | End: 2017-08-11 | Stop reason: HOSPADM

## 2017-08-10 RX ORDER — FUROSEMIDE 40 MG/1
40 TABLET ORAL DAILY
Status: DISCONTINUED | OUTPATIENT
Start: 2017-08-11 | End: 2017-08-11 | Stop reason: HOSPADM

## 2017-08-10 RX ORDER — ATORVASTATIN CALCIUM 10 MG/1
10 TABLET, FILM COATED ORAL DAILY
Status: DISCONTINUED | OUTPATIENT
Start: 2017-08-11 | End: 2017-08-11 | Stop reason: HOSPADM

## 2017-08-10 RX ORDER — ONDANSETRON 2 MG/ML
4 INJECTION INTRAMUSCULAR; INTRAVENOUS ONCE
Status: COMPLETED | OUTPATIENT
Start: 2017-08-10 | End: 2017-08-10

## 2017-08-10 RX ORDER — PANTOPRAZOLE SODIUM 40 MG/1
40 TABLET, DELAYED RELEASE ORAL EVERY MORNING
Status: DISCONTINUED | OUTPATIENT
Start: 2017-08-11 | End: 2017-08-11 | Stop reason: HOSPADM

## 2017-08-10 RX ORDER — ONDANSETRON 4 MG/1
4 TABLET, ORALLY DISINTEGRATING ORAL EVERY 6 HOURS PRN
Status: DISCONTINUED | OUTPATIENT
Start: 2017-08-10 | End: 2017-08-11 | Stop reason: HOSPADM

## 2017-08-10 RX ORDER — ONDANSETRON 4 MG/1
4 TABLET, FILM COATED ORAL EVERY 6 HOURS PRN
Status: DISCONTINUED | OUTPATIENT
Start: 2017-08-10 | End: 2017-08-11 | Stop reason: HOSPADM

## 2017-08-10 RX ORDER — RANOLAZINE 500 MG/1
500 TABLET, EXTENDED RELEASE ORAL 2 TIMES DAILY
Status: DISCONTINUED | OUTPATIENT
Start: 2017-08-11 | End: 2017-08-11 | Stop reason: HOSPADM

## 2017-08-10 RX ORDER — GABAPENTIN 300 MG/1
300 CAPSULE ORAL 2 TIMES DAILY
Status: DISCONTINUED | OUTPATIENT
Start: 2017-08-11 | End: 2017-08-11 | Stop reason: HOSPADM

## 2017-08-10 RX ORDER — SODIUM CHLORIDE 0.9 % (FLUSH) 0.9 %
1-10 SYRINGE (ML) INJECTION AS NEEDED
Status: DISCONTINUED | OUTPATIENT
Start: 2017-08-10 | End: 2017-08-11 | Stop reason: HOSPADM

## 2017-08-10 RX ORDER — ALUMINA, MAGNESIA, AND SIMETHICONE 2400; 2400; 240 MG/30ML; MG/30ML; MG/30ML
15 SUSPENSION ORAL EVERY 6 HOURS PRN
Status: DISCONTINUED | OUTPATIENT
Start: 2017-08-10 | End: 2017-08-11 | Stop reason: HOSPADM

## 2017-08-10 RX ORDER — LISINOPRIL 20 MG/1
40 TABLET ORAL DAILY
Status: DISCONTINUED | OUTPATIENT
Start: 2017-08-11 | End: 2017-08-11 | Stop reason: HOSPADM

## 2017-08-10 RX ORDER — ALBUTEROL SULFATE 90 UG/1
2 AEROSOL, METERED RESPIRATORY (INHALATION) EVERY 4 HOURS PRN
Status: DISCONTINUED | OUTPATIENT
Start: 2017-08-10 | End: 2017-08-10 | Stop reason: CLARIF

## 2017-08-10 RX ORDER — POTASSIUM CHLORIDE 20 MEQ/1
20 TABLET, EXTENDED RELEASE ORAL DAILY
Status: DISCONTINUED | OUTPATIENT
Start: 2017-08-11 | End: 2017-08-11 | Stop reason: HOSPADM

## 2017-08-10 RX ORDER — SODIUM CHLORIDE 9 MG/ML
125 INJECTION, SOLUTION INTRAVENOUS CONTINUOUS
Status: DISCONTINUED | OUTPATIENT
Start: 2017-08-10 | End: 2017-08-11 | Stop reason: HOSPADM

## 2017-08-10 RX ORDER — PREDNISOLONE ACETATE 10 MG/ML
1 SUSPENSION/ DROPS OPHTHALMIC EVERY 12 HOURS SCHEDULED
Status: DISCONTINUED | OUTPATIENT
Start: 2017-08-10 | End: 2017-08-11 | Stop reason: HOSPADM

## 2017-08-10 RX ORDER — KETOROLAC TROMETHAMINE 30 MG/ML
30 INJECTION, SOLUTION INTRAMUSCULAR; INTRAVENOUS EVERY 6 HOURS PRN
Status: DISCONTINUED | OUTPATIENT
Start: 2017-08-10 | End: 2017-08-11 | Stop reason: HOSPADM

## 2017-08-10 RX ORDER — ASPIRIN 325 MG
162 TABLET ORAL DAILY
Status: DISCONTINUED | OUTPATIENT
Start: 2017-08-11 | End: 2017-08-11 | Stop reason: HOSPADM

## 2017-08-10 RX ORDER — ONDANSETRON 2 MG/ML
4 INJECTION INTRAMUSCULAR; INTRAVENOUS EVERY 6 HOURS PRN
Status: DISCONTINUED | OUTPATIENT
Start: 2017-08-10 | End: 2017-08-11 | Stop reason: HOSPADM

## 2017-08-10 RX ORDER — TOPIRAMATE 100 MG/1
100 TABLET, FILM COATED ORAL DAILY
Status: DISCONTINUED | OUTPATIENT
Start: 2017-08-11 | End: 2017-08-11 | Stop reason: HOSPADM

## 2017-08-10 RX ADMIN — ONDANSETRON 4 MG: 2 INJECTION INTRAMUSCULAR; INTRAVENOUS at 20:12

## 2017-08-10 RX ADMIN — ASPIRIN 81 MG 324 MG: 81 TABLET ORAL at 20:12

## 2017-08-10 RX ADMIN — HYDROMORPHONE HYDROCHLORIDE 0.5 MG: 1 INJECTION, SOLUTION INTRAMUSCULAR; INTRAVENOUS; SUBCUTANEOUS at 20:15

## 2017-08-10 RX ADMIN — SODIUM CHLORIDE 125 ML/HR: 9 INJECTION, SOLUTION INTRAVENOUS at 20:16

## 2017-08-10 NOTE — TELEPHONE ENCOUNTER
FYI    Per wife, patient is having a lot of edema, SOB and chest tightness.  Wife wanted to if they could wait until his appointment on 08/18, or should he go to ER. Wife advised that patient should go to ED.

## 2017-08-11 ENCOUNTER — HOSPITAL ENCOUNTER (INPATIENT)
Facility: HOSPITAL | Age: 57
LOS: 25 days | Discharge: HOME | End: 2017-09-05
Attending: THORACIC SURGERY (CARDIOTHORACIC VASCULAR SURGERY) | Admitting: THORACIC SURGERY (CARDIOTHORACIC VASCULAR SURGERY)

## 2017-08-11 VITALS
HEART RATE: 81 BPM | TEMPERATURE: 97.8 F | DIASTOLIC BLOOD PRESSURE: 79 MMHG | WEIGHT: 279.3 LBS | SYSTOLIC BLOOD PRESSURE: 138 MMHG | BODY MASS INDEX: 39.99 KG/M2 | RESPIRATION RATE: 16 BRPM | OXYGEN SATURATION: 95 % | HEIGHT: 70 IN

## 2017-08-11 DIAGNOSIS — I25.110 CORONARY ARTERY DISEASE INVOLVING NATIVE CORONARY ARTERY OF NATIVE HEART WITH UNSTABLE ANGINA PECTORIS (HCC): Primary | ICD-10-CM

## 2017-08-11 DIAGNOSIS — E11.42 DIABETIC POLYNEUROPATHY ASSOCIATED WITH TYPE 2 DIABETES MELLITUS (HCC): ICD-10-CM

## 2017-08-11 DIAGNOSIS — R06.09 DOE (DYSPNEA ON EXERTION): ICD-10-CM

## 2017-08-11 DIAGNOSIS — E11.8 TYPE 2 DIABETES MELLITUS WITH COMPLICATION, UNSPECIFIED LONG TERM INSULIN USE STATUS: Chronic | ICD-10-CM

## 2017-08-11 DIAGNOSIS — I31.39 PERICARDIAL EFFUSION: ICD-10-CM

## 2017-08-11 DIAGNOSIS — Z74.09 IMPAIRED FUNCTIONAL MOBILITY, BALANCE, GAIT, AND ENDURANCE: ICD-10-CM

## 2017-08-11 PROBLEM — I25.10 CORONARY ARTERY DISEASE: Status: ACTIVE | Noted: 2017-08-11

## 2017-08-11 LAB
AMPHET+METHAMPHET UR QL: NEGATIVE
AMPHETAMINES UR QL: NEGATIVE
ANION GAP SERPL CALCULATED.3IONS-SCNC: 13.1 MMOL/L
APTT PPP: 26.7 SECONDS (ref 45–60)
APTT PPP: 29.2 SECONDS (ref 45–60)
BARBITURATES UR QL SCN: NEGATIVE
BASOPHILS # BLD AUTO: 0.06 10*3/MM3 (ref 0–0.2)
BASOPHILS NFR BLD AUTO: 1.1 % (ref 0–2.5)
BENZODIAZ UR QL SCN: NEGATIVE
BILIRUB UR QL STRIP: NEGATIVE
BUN BLD-MCNC: 13 MG/DL (ref 7–20)
BUN/CREAT SERPL: 21.7 (ref 6.3–21.9)
BUPRENORPHINE SERPL-MCNC: NEGATIVE NG/ML
CALCIUM SPEC-SCNC: 8.8 MG/DL (ref 8.4–10.2)
CANNABINOIDS SERPL QL: NEGATIVE
CHLORIDE SERPL-SCNC: 109 MMOL/L (ref 98–107)
CLARITY UR: CLEAR
CO2 SERPL-SCNC: 22 MMOL/L (ref 26–30)
COCAINE UR QL: NEGATIVE
COLOR UR: YELLOW
CREAT BLD-MCNC: 0.6 MG/DL (ref 0.6–1.3)
DEPRECATED RDW RBC AUTO: 37.3 FL (ref 37–54)
EOSINOPHIL # BLD AUTO: 0.26 10*3/MM3 (ref 0–0.7)
EOSINOPHIL NFR BLD AUTO: 4.6 % (ref 0–7)
ERYTHROCYTE [DISTWIDTH] IN BLOOD BY AUTOMATED COUNT: 12.4 % (ref 11.5–14.5)
GFR SERPL CREATININE-BSD FRML MDRD: 139 ML/MIN/1.73
GLUCOSE BLD-MCNC: 185 MG/DL (ref 74–98)
GLUCOSE BLDC GLUCOMTR-MCNC: 155 MG/DL (ref 70–130)
GLUCOSE BLDC GLUCOMTR-MCNC: 240 MG/DL (ref 70–130)
GLUCOSE BLDC GLUCOMTR-MCNC: 268 MG/DL (ref 70–130)
GLUCOSE BLDC GLUCOMTR-MCNC: 282 MG/DL (ref 70–130)
GLUCOSE BLDC GLUCOMTR-MCNC: 337 MG/DL (ref 70–130)
GLUCOSE UR STRIP-MCNC: ABNORMAL MG/DL
HBA1C MFR BLD: 8.9 % (ref 4.8–5.6)
HCT VFR BLD AUTO: 38.2 % (ref 42–52)
HGB BLD-MCNC: 13 G/DL (ref 14–18)
HGB UR QL STRIP.AUTO: NEGATIVE
IMM GRANULOCYTES # BLD: 0.06 10*3/MM3 (ref 0–0.06)
IMM GRANULOCYTES NFR BLD: 1.1 % (ref 0–0.6)
KETONES UR QL STRIP: NEGATIVE
LEUKOCYTE ESTERASE UR QL STRIP.AUTO: NEGATIVE
LYMPHOCYTES # BLD AUTO: 2.01 10*3/MM3 (ref 0.6–3.4)
LYMPHOCYTES NFR BLD AUTO: 35.6 % (ref 10–50)
MCH RBC QN AUTO: 28 PG (ref 27–31)
MCHC RBC AUTO-ENTMCNC: 34 G/DL (ref 30–37)
MCV RBC AUTO: 82.3 FL (ref 80–94)
METHADONE UR QL SCN: NEGATIVE
MONOCYTES # BLD AUTO: 0.39 10*3/MM3 (ref 0–0.9)
MONOCYTES NFR BLD AUTO: 6.9 % (ref 0–12)
NEUTROPHILS # BLD AUTO: 2.87 10*3/MM3 (ref 2–6.9)
NEUTROPHILS NFR BLD AUTO: 50.7 % (ref 37–80)
NITRITE UR QL STRIP: NEGATIVE
NRBC BLD MANUAL-RTO: 0 /100 WBC (ref 0–0)
OPIATES UR QL: NEGATIVE
OXYCODONE UR QL SCN: NEGATIVE
PA ADP PRP-ACNC: 186 PRU
PCP UR QL SCN: NEGATIVE
PH UR STRIP.AUTO: 5.5 [PH] (ref 5–8)
PLATELET # BLD AUTO: 153 10*3/MM3 (ref 130–400)
PMV BLD AUTO: 11 FL (ref 6–12)
POTASSIUM BLD-SCNC: 4.1 MMOL/L (ref 3.5–5.1)
PROPOXYPH UR QL: NEGATIVE
PROT UR QL STRIP: NEGATIVE
RBC # BLD AUTO: 4.64 10*6/MM3 (ref 4.7–6.1)
SODIUM BLD-SCNC: 140 MMOL/L (ref 137–145)
SP GR UR STRIP: 1.03 (ref 1–1.03)
TRICYCLICS UR QL SCN: NEGATIVE
TROPONIN I SERPL-MCNC: <0.006 NG/ML
TROPONIN I SERPL-MCNC: <0.012 NG/ML (ref 0–0.03)
UROBILINOGEN UR QL STRIP: ABNORMAL
WBC NRBC COR # BLD: 5.65 10*3/MM3 (ref 4.8–10.8)

## 2017-08-11 PROCEDURE — 80306 DRUG TEST PRSMV INSTRMNT: CPT | Performed by: PHYSICIAN ASSISTANT

## 2017-08-11 PROCEDURE — 99153 MOD SED SAME PHYS/QHP EA: CPT | Performed by: INTERNAL MEDICINE

## 2017-08-11 PROCEDURE — 63710000001 INSULIN LISPRO (HUMAN) PER 5 UNITS

## 2017-08-11 PROCEDURE — 0 IOPAMIDOL PER 1 ML: Performed by: INTERNAL MEDICINE

## 2017-08-11 PROCEDURE — G0378 HOSPITAL OBSERVATION PER HR: HCPCS

## 2017-08-11 PROCEDURE — 25010000002 HYDROMORPHONE PER 4 MG: Performed by: THORACIC SURGERY (CARDIOTHORACIC VASCULAR SURGERY)

## 2017-08-11 PROCEDURE — 63710000001 INSULIN DETEMIR PER 5 UNITS: Performed by: THORACIC SURGERY (CARDIOTHORACIC VASCULAR SURGERY)

## 2017-08-11 PROCEDURE — 25010000002 FENTANYL CITRATE (PF) 100 MCG/2ML SOLUTION: Performed by: INTERNAL MEDICINE

## 2017-08-11 PROCEDURE — 81003 URINALYSIS AUTO W/O SCOPE: CPT | Performed by: PHYSICIAN ASSISTANT

## 2017-08-11 PROCEDURE — 25010000002 MIDAZOLAM PER 1 MG: Performed by: INTERNAL MEDICINE

## 2017-08-11 PROCEDURE — 99217 PR OBSERVATION CARE DISCHARGE MANAGEMENT: CPT | Performed by: NURSE PRACTITIONER

## 2017-08-11 PROCEDURE — 99223 1ST HOSP IP/OBS HIGH 75: CPT | Performed by: THORACIC SURGERY (CARDIOTHORACIC VASCULAR SURGERY)

## 2017-08-11 PROCEDURE — 96376 TX/PRO/DX INJ SAME DRUG ADON: CPT

## 2017-08-11 PROCEDURE — 25010000002 ONDANSETRON PER 1 MG: Performed by: FAMILY MEDICINE

## 2017-08-11 PROCEDURE — 83036 HEMOGLOBIN GLYCOSYLATED A1C: CPT | Performed by: PHYSICIAN ASSISTANT

## 2017-08-11 PROCEDURE — 84484 ASSAY OF TROPONIN QUANT: CPT | Performed by: FAMILY MEDICINE

## 2017-08-11 PROCEDURE — 84484 ASSAY OF TROPONIN QUANT: CPT | Performed by: PHYSICIAN ASSISTANT

## 2017-08-11 PROCEDURE — 93458 L HRT ARTERY/VENTRICLE ANGIO: CPT | Performed by: INTERNAL MEDICINE

## 2017-08-11 PROCEDURE — 99214 OFFICE O/P EST MOD 30 MIN: CPT | Performed by: INTERNAL MEDICINE

## 2017-08-11 PROCEDURE — 82962 GLUCOSE BLOOD TEST: CPT

## 2017-08-11 PROCEDURE — 85025 COMPLETE CBC W/AUTO DIFF WBC: CPT | Performed by: FAMILY MEDICINE

## 2017-08-11 PROCEDURE — 85576 BLOOD PLATELET AGGREGATION: CPT | Performed by: PHYSICIAN ASSISTANT

## 2017-08-11 PROCEDURE — 25010000002 HYDROMORPHONE PER 4 MG: Performed by: FAMILY MEDICINE

## 2017-08-11 PROCEDURE — 85730 THROMBOPLASTIN TIME PARTIAL: CPT

## 2017-08-11 PROCEDURE — 25010000002 HEPARIN (PORCINE) PER 1000 UNITS: Performed by: THORACIC SURGERY (CARDIOTHORACIC VASCULAR SURGERY)

## 2017-08-11 PROCEDURE — G0108 DIAB MANAGE TRN  PER INDIV: HCPCS | Performed by: REGISTERED NURSE

## 2017-08-11 PROCEDURE — C1769 GUIDE WIRE: HCPCS | Performed by: INTERNAL MEDICINE

## 2017-08-11 PROCEDURE — 25010000002 ONDANSETRON PER 1 MG: Performed by: INTERNAL MEDICINE

## 2017-08-11 PROCEDURE — 25010000002 HEPARIN (PORCINE) PER 1000 UNITS: Performed by: INTERNAL MEDICINE

## 2017-08-11 PROCEDURE — 85730 THROMBOPLASTIN TIME PARTIAL: CPT | Performed by: THORACIC SURGERY (CARDIOTHORACIC VASCULAR SURGERY)

## 2017-08-11 PROCEDURE — C1894 INTRO/SHEATH, NON-LASER: HCPCS | Performed by: INTERNAL MEDICINE

## 2017-08-11 PROCEDURE — 80048 BASIC METABOLIC PNL TOTAL CA: CPT | Performed by: FAMILY MEDICINE

## 2017-08-11 PROCEDURE — 99152 MOD SED SAME PHYS/QHP 5/>YRS: CPT | Performed by: INTERNAL MEDICINE

## 2017-08-11 RX ORDER — TOPIRAMATE 100 MG/1
100 TABLET, FILM COATED ORAL DAILY
Status: DISCONTINUED | OUTPATIENT
Start: 2017-08-12 | End: 2017-08-16

## 2017-08-11 RX ORDER — RANOLAZINE 500 MG/1
500 TABLET, EXTENDED RELEASE ORAL 2 TIMES DAILY
Status: DISCONTINUED | OUTPATIENT
Start: 2017-08-11 | End: 2017-08-15

## 2017-08-11 RX ORDER — NITROGLYCERIN 20 MG/100ML
10-50 INJECTION INTRAVENOUS
Status: DISCONTINUED | OUTPATIENT
Start: 2017-08-11 | End: 2017-08-16

## 2017-08-11 RX ORDER — DEXTROSE MONOHYDRATE 25 G/50ML
25 INJECTION, SOLUTION INTRAVENOUS
Status: DISCONTINUED | OUTPATIENT
Start: 2017-08-11 | End: 2017-08-11 | Stop reason: HOSPADM

## 2017-08-11 RX ORDER — FUROSEMIDE 40 MG/1
40 TABLET ORAL DAILY
Status: DISCONTINUED | OUTPATIENT
Start: 2017-08-12 | End: 2017-08-15

## 2017-08-11 RX ORDER — ATORVASTATIN CALCIUM 10 MG/1
10 TABLET, FILM COATED ORAL DAILY
Status: DISCONTINUED | OUTPATIENT
Start: 2017-08-12 | End: 2017-08-14

## 2017-08-11 RX ORDER — CETIRIZINE HYDROCHLORIDE 10 MG/1
10 TABLET ORAL DAILY
Status: DISCONTINUED | OUTPATIENT
Start: 2017-08-11 | End: 2017-09-05 | Stop reason: HOSPADM

## 2017-08-11 RX ORDER — SODIUM CHLORIDE 9 MG/ML
100 INJECTION, SOLUTION INTRAVENOUS CONTINUOUS
Status: DISCONTINUED | OUTPATIENT
Start: 2017-08-11 | End: 2017-08-11 | Stop reason: HOSPADM

## 2017-08-11 RX ORDER — ATENOLOL 50 MG/1
50 TABLET ORAL EVERY 12 HOURS SCHEDULED
Status: DISCONTINUED | OUTPATIENT
Start: 2017-08-11 | End: 2017-08-11 | Stop reason: HOSPADM

## 2017-08-11 RX ORDER — GABAPENTIN 300 MG/1
300 CAPSULE ORAL EVERY 12 HOURS SCHEDULED
Status: DISCONTINUED | OUTPATIENT
Start: 2017-08-11 | End: 2017-09-05 | Stop reason: HOSPADM

## 2017-08-11 RX ORDER — HYDROMORPHONE HYDROCHLORIDE 1 MG/ML
0.5 INJECTION, SOLUTION INTRAMUSCULAR; INTRAVENOUS; SUBCUTANEOUS EVERY 4 HOURS PRN
Status: DISCONTINUED | OUTPATIENT
Start: 2017-08-11 | End: 2017-08-15

## 2017-08-11 RX ORDER — ATENOLOL 25 MG/1
25 TABLET ORAL DAILY
Status: DISCONTINUED | OUTPATIENT
Start: 2017-08-12 | End: 2017-08-14

## 2017-08-11 RX ORDER — ASPIRIN 81 MG/1
81 TABLET, CHEWABLE ORAL DAILY
Status: DISCONTINUED | OUTPATIENT
Start: 2017-08-11 | End: 2017-08-15

## 2017-08-11 RX ORDER — ATENOLOL 25 MG/1
25 TABLET ORAL DAILY
Status: DISCONTINUED | OUTPATIENT
Start: 2017-08-11 | End: 2017-08-11

## 2017-08-11 RX ORDER — AMLODIPINE BESYLATE 5 MG/1
5 TABLET ORAL DAILY
Status: DISCONTINUED | OUTPATIENT
Start: 2017-08-12 | End: 2017-09-05 | Stop reason: HOSPADM

## 2017-08-11 RX ORDER — LISINOPRIL 20 MG/1
40 TABLET ORAL DAILY
Status: DISCONTINUED | OUTPATIENT
Start: 2017-08-12 | End: 2017-08-15

## 2017-08-11 RX ORDER — ACETAMINOPHEN 325 MG/1
650 TABLET ORAL EVERY 4 HOURS PRN
Status: DISCONTINUED | OUTPATIENT
Start: 2017-08-11 | End: 2017-09-05 | Stop reason: HOSPADM

## 2017-08-11 RX ORDER — ONDANSETRON 2 MG/ML
INJECTION INTRAMUSCULAR; INTRAVENOUS AS NEEDED
Status: DISCONTINUED | OUTPATIENT
Start: 2017-08-11 | End: 2017-08-11 | Stop reason: HOSPADM

## 2017-08-11 RX ORDER — ATORVASTATIN CALCIUM 10 MG/1
10 TABLET, FILM COATED ORAL DAILY
Status: DISCONTINUED | OUTPATIENT
Start: 2017-08-11 | End: 2017-08-11

## 2017-08-11 RX ORDER — FENTANYL CITRATE 50 UG/ML
INJECTION, SOLUTION INTRAMUSCULAR; INTRAVENOUS AS NEEDED
Status: DISCONTINUED | OUTPATIENT
Start: 2017-08-11 | End: 2017-08-11 | Stop reason: HOSPADM

## 2017-08-11 RX ORDER — FUROSEMIDE 40 MG/1
40 TABLET ORAL DAILY
Status: DISCONTINUED | OUTPATIENT
Start: 2017-08-11 | End: 2017-08-11

## 2017-08-11 RX ORDER — INSULIN GLARGINE 100 [IU]/ML
10 INJECTION, SOLUTION SUBCUTANEOUS NIGHTLY
Status: DISCONTINUED | OUTPATIENT
Start: 2017-08-11 | End: 2017-08-11

## 2017-08-11 RX ORDER — PREDNISOLONE ACETATE 10 MG/ML
1 SUSPENSION/ DROPS OPHTHALMIC
Status: DISCONTINUED | OUTPATIENT
Start: 2017-08-11 | End: 2017-08-15

## 2017-08-11 RX ORDER — POTASSIUM CHLORIDE 1.5 G/1.77G
20 POWDER, FOR SOLUTION ORAL DAILY
Status: DISCONTINUED | OUTPATIENT
Start: 2017-08-12 | End: 2017-08-14

## 2017-08-11 RX ORDER — MONTELUKAST SODIUM 10 MG/1
5 TABLET ORAL NIGHTLY
Status: DISCONTINUED | OUTPATIENT
Start: 2017-08-11 | End: 2017-08-15

## 2017-08-11 RX ORDER — TRAZODONE HYDROCHLORIDE 50 MG/1
25 TABLET ORAL NIGHTLY
Status: DISCONTINUED | OUTPATIENT
Start: 2017-08-11 | End: 2017-08-15

## 2017-08-11 RX ORDER — PANTOPRAZOLE SODIUM 40 MG/1
40 TABLET, DELAYED RELEASE ORAL EVERY MORNING
Status: DISCONTINUED | OUTPATIENT
Start: 2017-08-12 | End: 2017-09-05 | Stop reason: HOSPADM

## 2017-08-11 RX ORDER — AMLODIPINE BESYLATE 5 MG/1
10 TABLET ORAL DAILY
Status: DISCONTINUED | OUTPATIENT
Start: 2017-08-11 | End: 2017-08-11 | Stop reason: HOSPADM

## 2017-08-11 RX ORDER — HEPARIN SODIUM 10000 [USP'U]/100ML
15 INJECTION, SOLUTION INTRAVENOUS
Status: DISPENSED | OUTPATIENT
Start: 2017-08-11 | End: 2017-08-14

## 2017-08-11 RX ORDER — MIDAZOLAM HYDROCHLORIDE 1 MG/ML
INJECTION INTRAMUSCULAR; INTRAVENOUS AS NEEDED
Status: DISCONTINUED | OUTPATIENT
Start: 2017-08-11 | End: 2017-08-11 | Stop reason: HOSPADM

## 2017-08-11 RX ORDER — AMLODIPINE BESYLATE 5 MG/1
5 TABLET ORAL DAILY
Status: DISCONTINUED | OUTPATIENT
Start: 2017-08-11 | End: 2017-08-11

## 2017-08-11 RX ORDER — LIDOCAINE HYDROCHLORIDE 10 MG/ML
INJECTION, SOLUTION INFILTRATION; PERINEURAL AS NEEDED
Status: DISCONTINUED | OUTPATIENT
Start: 2017-08-11 | End: 2017-08-11 | Stop reason: HOSPADM

## 2017-08-11 RX ADMIN — ASPIRIN 81 MG 81 MG: 81 TABLET ORAL at 14:09

## 2017-08-11 RX ADMIN — ATORVASTATIN CALCIUM 10 MG: 10 TABLET, FILM COATED ORAL at 09:25

## 2017-08-11 RX ADMIN — INSULIN LISPRO 4 UNITS: 100 INJECTION, SOLUTION INTRAVENOUS; SUBCUTANEOUS at 20:18

## 2017-08-11 RX ADMIN — NITROGLYCERIN 5 MCG/MIN: 20 INJECTION INTRAVENOUS at 14:40

## 2017-08-11 RX ADMIN — TRAZODONE HYDROCHLORIDE 25 MG: 50 TABLET ORAL at 20:18

## 2017-08-11 RX ADMIN — HEPARIN SODIUM AND DEXTROSE 8 UNITS/KG/HR: 10000; 5 INJECTION INTRAVENOUS at 14:24

## 2017-08-11 RX ADMIN — HYDROMORPHONE HYDROCHLORIDE 0.5 MG: 1 INJECTION, SOLUTION INTRAMUSCULAR; INTRAVENOUS; SUBCUTANEOUS at 01:06

## 2017-08-11 RX ADMIN — INSULIN DETEMIR 10 UNITS: 100 INJECTION, SOLUTION SUBCUTANEOUS at 21:21

## 2017-08-11 RX ADMIN — CETIRIZINE HYDROCHLORIDE 10 MG: 10 TABLET, FILM COATED ORAL at 14:09

## 2017-08-11 RX ADMIN — RANOLAZINE 500 MG: 500 TABLET, FILM COATED, EXTENDED RELEASE ORAL at 09:00

## 2017-08-11 RX ADMIN — POTASSIUM CHLORIDE 20 MEQ: 20 TABLET, EXTENDED RELEASE ORAL at 09:25

## 2017-08-11 RX ADMIN — INSULIN LISPRO 5 UNITS: 100 INJECTION, SOLUTION INTRAVENOUS; SUBCUTANEOUS at 15:06

## 2017-08-11 RX ADMIN — GABAPENTIN 300 MG: 300 CAPSULE ORAL at 09:25

## 2017-08-11 RX ADMIN — HYDROMORPHONE HYDROCHLORIDE 0.5 MG: 1 INJECTION, SOLUTION INTRAMUSCULAR; INTRAVENOUS; SUBCUTANEOUS at 15:00

## 2017-08-11 RX ADMIN — LISINOPRIL 40 MG: 20 TABLET ORAL at 09:25

## 2017-08-11 RX ADMIN — RANOLAZINE 500 MG: 500 TABLET, FILM COATED, EXTENDED RELEASE ORAL at 17:06

## 2017-08-11 RX ADMIN — HYDROMORPHONE HYDROCHLORIDE 0.5 MG: 1 INJECTION, SOLUTION INTRAMUSCULAR; INTRAVENOUS; SUBCUTANEOUS at 05:03

## 2017-08-11 RX ADMIN — ATENOLOL 50 MG: 50 TABLET ORAL at 09:25

## 2017-08-11 RX ADMIN — GABAPENTIN 300 MG: 300 CAPSULE ORAL at 20:18

## 2017-08-11 RX ADMIN — SODIUM CHLORIDE 75 ML/HR: 9 INJECTION, SOLUTION INTRAVENOUS at 06:32

## 2017-08-11 RX ADMIN — TOPIRAMATE 100 MG: 100 TABLET, FILM COATED ORAL at 09:25

## 2017-08-11 RX ADMIN — FUROSEMIDE 40 MG: 40 TABLET ORAL at 09:25

## 2017-08-11 RX ADMIN — MONTELUKAST SODIUM 5 MG: 10 TABLET, FILM COATED ORAL at 20:18

## 2017-08-11 RX ADMIN — ONDANSETRON 4 MG: 2 INJECTION INTRAMUSCULAR; INTRAVENOUS at 09:16

## 2017-08-11 RX ADMIN — AMLODIPINE BESYLATE 10 MG: 5 TABLET ORAL at 09:25

## 2017-08-11 NOTE — PLAN OF CARE
Problem: Patient Care Overview (Adult)  Goal: Plan of Care Review  Outcome: Ongoing (interventions implemented as appropriate)    08/11/17 0236   Coping/Psychosocial Response Interventions   Plan Of Care Reviewed With patient   Patient Care Overview   Progress improving   Outcome Evaluation   Outcome Summary/Follow up Plan Patient is getting relief with pain medications, hopes to go home tomorrow after procedure         08/11/17 0236   Coping/Psychosocial Response Interventions   Plan Of Care Reviewed With patient   Patient Care Overview   Progress improving   Outcome Evaluation   Outcome Summary/Follow up Plan Patient is getting relief with pain medications, hopes to go home tomorrow after procedure       Goal: Adult Individualization and Mutuality  Outcome: Ongoing (interventions implemented as appropriate)    08/11/17 0236   Individualization   Patient Specific Preferences feel better   Patient Specific Goals take care of blockages and reduce chest pain   Patient Specific Interventions heart cath in the morning, pain meds       Goal: Discharge Needs Assessment  Outcome: Ongoing (interventions implemented as appropriate)    08/11/17 0236   Discharge Needs Assessment   Concerns To Be Addressed no discharge needs identified   Discharge Disposition still a patient   Current Health   Anticipated Changes Related to Illness none   Self-Care   Equipment Currently Used at Home none   Living Environment   Transportation Available car;family or friend will provide

## 2017-08-11 NOTE — ED NOTES
2048: DR. HERNANDEZ CALLED PER DR. REYNOLDS, CALL SENT TO HIM @ THIS TIME.     Luba Bowen  08/10/17 2048

## 2017-08-11 NOTE — ED NOTES
2056: DR. CABRAL CALLED PER DR. REYNOLDS, CALL SENT TO HIM @ THIS TIME.     Luba Bowen  08/10/17 2057

## 2017-08-11 NOTE — ED PROVIDER NOTES
Subjective   History of Present Illness  TRIAGE CHIEF COMPLAINT:   Chief Complaint   Patient presents with   • Chest Pain         HPI: Denzel Harding   is a 57 y.o. male   who presents to the emergency department complaining of chest pain. History of coronary artery disease with stent, diabetes, hypertension, COPD.  Patient failed a recent stress test.  Followed by Dr. Kim.  Describes onset of chest pain described as tightness, roughly 5 out of 10 in intensity around 4:30 PM.  He has had associated shortness of breath.  Pain is nonradiating.  Denies any exacerbating or relieving factors.  Denies diaphoresis, nausea, vomiting, cough, fever, chills, abdominal pain.  He has not taken anything prior to arrival.  States he cannot tolerate nitroglycerin due to severe headaches and he is also allergic to morphine but is able to take Dilaudid.  No recent illness or trauma.           Review of Systems   All other systems reviewed and are negative.      Past Medical History:   Diagnosis Date   • Anxiety    • Arthritis    • Asthma    • BiPAP (biphasic positive airway pressure) dependence    • Brachial neuritis 1/19/2017   • CAD in native artery    • Cardiac disorder    • Chest pain    • COPD (chronic obstructive pulmonary disease)    • Depression    • Diabetes mellitus    • Dizziness    • Edema    • GERD (gastroesophageal reflux disease)    • H/O chest x-ray 07/04/2015    Mild Left base atelectasis   • H/O echocardiogram 07/05/2015    Normal LVSF. EF of 60-65%. Grade 1 diastolic dysfunction of the LV myocardium. No evidence of pericardial effusion   • H/O exercise stress test    • History of herniated intervertebral disc     History of left L5-S1 disc herniation   • Hypertension    • LOM (loss of memory) 1/19/2017   • Lower back pain     Right   • Measles    • Obstructive sleep apnea    • Palpitations    • Seizure disorder    • Shortness of breath 1/19/2017   • SOB (shortness of breath)    • Stroke    • Wears glasses         Allergies   Allergen Reactions   • Sulfa Antibiotics Anaphylaxis, Nausea And Vomiting and Delirium   • Codeine Nausea And Vomiting     Can only take with Phenergan   • Morphine      Does not work. It causes pain       Past Surgical History:   Procedure Laterality Date   • BACK SURGERY     • CARDIAC CATHETERIZATION     • CARDIOVASCULAR STRESS TEST  07/03/2017    WITH DR HERNANDEZ AT [facility]   • CARPAL TUNNEL RELEASE Right    • CATARACT EXTRACTION W/ INTRAOCULAR LENS IMPLANT Right 6/12/2017    Procedure: CATARACT PHACO EXTRACTION WITH INTRAOCULAR LENS IMPLANT RIGHT ;  Surgeon: Natalie Cao MD;  Location: [facility];  Service:    • CATARACT EXTRACTION W/ INTRAOCULAR LENS IMPLANT Left 7/10/2017    Procedure: CATARACT PHACO EXTRACTION WITH INTRAOCULAR LENS IMPLANT LEFT;  Surgeon: Natalie Cao MD;  Location: [facility];  Service:    • CHOLECYSTECTOMY     • COLONOSCOPY     • CORONARY ANGIOPLASTY WITH STENT PLACEMENT     • CORONARY ANGIOPLASTY WITH STENT PLACEMENT      X1-LAD   • ENDOSCOPY     • KNEE ARTHROSCOPY Bilateral    • NEUROPLASTY / TRANSPOSITION ULNAR NERVE AT ELBOW     • OTHER SURGICAL HISTORY      Foraminotomy and discectomy       Family History   Problem Relation Age of Onset   • Hypertension Mother    • Alcohol abuse Father    • Heart disease Other    • Stroke Other    • Hypertension Other    • Other Other      Neurologic disorder   • Parkinsonism Other    • Stroke Other    • Heart disease Other    • Hypertension Other    • Heart disease Other    • Stroke Other    • Hypertension Other        Social History     Social History   • Marital status:      Spouse name: N/A   • Number of children: N/A   • Years of education: N/A     Social History Main Topics   • Smoking status: Former Smoker     Types: Cigarettes     Quit date: 1/1/1998   • Smokeless tobacco: Former User     Types: Snuff     Quit date: 5/24/2017   • Alcohol use Yes      Comment: 3 PER YEAR   • Drug use: No   • Sexual activity: Defer     Other  Topics Concern   • None     Social History Narrative           Objective   Physical Exam    CONSTITUTIONAL: Awake, oriented, appears non-toxic   HENT: Atraumatic, normocephalic, oral mucosa pink and moist, airway patent. Nares patent without drainage. External ears normal.   EYES: Conjunctiva clear, EOMI, PERRL   NECK: Trachea midline, non-tender, supple   CARDIOVASCULAR: Normal heart rate, Normal rhythm, No murmurs, rubs, gallops   PULMONARY/CHEST: Clear to auscultation, no rhonchi, wheezes, or rales. Symmetrical breath sounds. Non-tender.   ABDOMINAL: Non-distended, soft, non-tender - no rebound or guarding. BS normal.   NEUROLOGIC: Non-focal, moving all four extremities, no gross sensory or motor deficits.   EXTREMITIES: No clubbing, cyanosis. 1+ edema   SKIN: Warm, Dry, No erythema, No rash     XR Chest 1 View    (Results Pending)           EKG:  NSR rate 78 no acute ST changes or TWI       Procedures         ED Course  ED Course          ED COURSE / MEDICAL DECISION MAKING:   Nursing notes reviewed.     Patient with substernal chest pain and dyspnea.  He has failed a recent stress test.  First set of enzymes and EKG here unremarkable.  Case discussed with his cardiologist Dr. Kim who requests hospitalist admission with plan for likely heart cath tomorrow morning.    DECISION TO DISCHARGE/ADMIT: see ED care timeline       Electronically signed by: Nicolás Alex MD, 8/10/2017 9:03 PM              [facility]    Final diagnoses:   Chest pain, unspecified type            Nicolás Alex MD  08/10/17 3285

## 2017-08-11 NOTE — H&P (VIEW-ONLY)
[facility]-Cardiology Consult Note    Referring Provider: Denise  Reason for Consultation: Unstable Angina    Patient Care Team:  Ottoniel Toledo MD as PCP - General (Family Medicine)    Chief complaint : Chest pain    Subjective .     History of present illness:  This is a 57-year-old male patient with known coronary artery disease who initially presented with an escalation of angina.  The patient underwent an outpatient vasodilator nuclear stress test which showed anterior lateral wall ischemia.  The patient's medical antianginal therapies was escalated using a combination of beta blockade, calcium channel blockers and Ranexa.  He was intolerant to long-acting nitroglycerin due to severe headaches.  The patient presented to the emergency room with worsening chest discomfort shortness of breath and peripheral edema.  The patient indicates that his chest discomfort is a pressure sensation diffusely across his central chest which was previously occurring with exertion but now occurs both with minimal exertion as well as at rest.  The patient indicates that the quality is identical to that which she was experiencing prior to placing a stent in his left anterior descending in [place] several years ago.  The patient also has worsening shortness of breath both at rest as well as minimal activity.  The frequency duration and intensity of his chest discomfort and shortness of breath have all escalated culminating in class IV symptoms.  He has also noticed increased swelling in his feet and ankles.  He has no orthopnea or PND.  There is no dizziness palpitations or syncope.  The patient is a former smoker but has not smoked since 1998.  He has no history of diabetes but does have significant hypertension.  His initial 12-lead electrocardiogram showed no high-risk ischemic ST-T wave changes or injury current.  His troponins have been serially less than 0.012.    Review of Systems   Review of Systems   Constitution: Negative for chills,  diaphoresis, fever, weakness, malaise/fatigue, night sweats, weight gain and weight loss.   HENT: Negative for ear discharge, hearing loss, hoarse voice and nosebleeds.    Eyes: Negative for discharge, double vision, pain and photophobia.   Cardiovascular: Positive for chest pain, dyspnea on exertion and leg swelling. Negative for claudication, cyanosis, irregular heartbeat, near-syncope, orthopnea, palpitations, paroxysmal nocturnal dyspnea and syncope.   Respiratory: Positive for shortness of breath. Negative for cough, hemoptysis, sputum production and wheezing.    Endocrine: Negative for cold intolerance, heat intolerance, polydipsia, polyphagia and polyuria.   Hematologic/Lymphatic: Negative for adenopathy and bleeding problem. Does not bruise/bleed easily.   Skin: Negative for color change, flushing, itching and rash.   Musculoskeletal: Negative for muscle cramps, muscle weakness, myalgias and stiffness.   Gastrointestinal: Negative for abdominal pain, diarrhea, hematemesis, hematochezia, nausea and vomiting.   Genitourinary: Negative for dysuria, frequency and nocturia.   Neurological: Negative for dizziness, focal weakness, loss of balance, numbness, paresthesias and seizures.   Psychiatric/Behavioral: Negative for altered mental status, hallucinations and suicidal ideas.   Allergic/Immunologic: Negative for HIV exposure, hives and persistent infections.       History  Past Medical History:   Diagnosis Date   • Anxiety    • Arthritis    • Asthma    • BiPAP (biphasic positive airway pressure) dependence    • Brachial neuritis 1/19/2017   • CAD in native artery    • Cardiac disorder    • Chest pain    • COPD (chronic obstructive pulmonary disease)    • Depression    • Diabetes mellitus    • Dizziness    • Edema    • GERD (gastroesophageal reflux disease)    • H/O chest x-ray 07/04/2015    Mild Left base atelectasis   • H/O echocardiogram 07/05/2015    Normal LVSF. EF of 60-65%. Grade 1 diastolic dysfunction of  the LV myocardium. No evidence of pericardial effusion   • H/O exercise stress test    • History of herniated intervertebral disc     History of left L5-S1 disc herniation   • Hypertension    • LOM (loss of memory) 1/19/2017   • Lower back pain     Right   • Measles    • Obstructive sleep apnea    • Palpitations    • Seizure disorder    • Shortness of breath 1/19/2017   • SOB (shortness of breath)    • Stroke    • Wears glasses    , Past Surgical History:   Procedure Laterality Date   • BACK SURGERY     • CARDIAC CATHETERIZATION     • CARDIOVASCULAR STRESS TEST  07/03/2017    WITH DR HERNANDEZ AT [facility]   • CARPAL TUNNEL RELEASE Right    • CATARACT EXTRACTION W/ INTRAOCULAR LENS IMPLANT Right 6/12/2017    Procedure: CATARACT PHACO EXTRACTION WITH INTRAOCULAR LENS IMPLANT RIGHT ;  Surgeon: Natalie Cao MD;  Location: [facility];  Service:    • CATARACT EXTRACTION W/ INTRAOCULAR LENS IMPLANT Left 7/10/2017    Procedure: CATARACT PHACO EXTRACTION WITH INTRAOCULAR LENS IMPLANT LEFT;  Surgeon: Natalie Cao MD;  Location: [facility];  Service:    • CHOLECYSTECTOMY     • COLONOSCOPY     • CORONARY ANGIOPLASTY WITH STENT PLACEMENT     • CORONARY ANGIOPLASTY WITH STENT PLACEMENT      X1-LAD   • ENDOSCOPY     • EYE SURGERY      cataract surgery both eyes   • KNEE ARTHROSCOPY Bilateral    • NEUROPLASTY / TRANSPOSITION ULNAR NERVE AT ELBOW     • OTHER SURGICAL HISTORY      Foraminotomy and discectomy   , Family History   Problem Relation Age of Onset   • Hypertension Mother    • Arthritis Mother    • Alcohol abuse Father    • Heart disease Other    • Stroke Other    • Hypertension Other    • Other Other      Neurologic disorder   • Parkinsonism Other    • Stroke Other    • Heart disease Other    • Hypertension Other    • Heart disease Other    • Stroke Other    • Hypertension Other    , Social History   Substance Use Topics   • Smoking status: Former Smoker     Types: Cigarettes     Quit date: 1/1/1998   • Smokeless tobacco: Former  User     Types: Snuff     Quit date: 5/24/2017   • Alcohol use Yes      Comment: 3 PER YEAR   , Prescriptions Prior to Admission   Medication Sig Dispense Refill Last Dose   • albuterol (PROVENTIL HFA;VENTOLIN HFA) 108 (90 BASE) MCG/ACT inhaler Inhale 2 puffs Every 4 (Four) Hours As Needed. ProAir  (90 Base) MCG/ACT Inhalation Aerosol Solution; Patient Sig: ProAir  (90 Base) MCG/ACT Inhalation Aerosol Solution ; 8; 0; 05-Oct-2015; Active   Taking   • amLODIPine (NORVASC) 2.5 MG tablet Take 2 tablets by mouth Daily. 30 tablet 11 8/10/2017 at Unknown time   • aspirin 81 MG tablet Take 1 tablet by mouth daily.   8/10/2017 at Unknown time   • atenolol (TENORMIN) 25 MG tablet Take 1 tablet by mouth daily.   8/10/2017 at Unknown time   • Cetirizine HCl 10 MG capsule Take 1 capsule by mouth daily.   8/9/2017 at Unknown time   • clopidogrel (PLAVIX) 75 MG tablet Take 1 tablet by mouth daily.   8/9/2017 at Unknown time   • furosemide (LASIX) 40 MG tablet Take 1 tablet by mouth daily.   8/10/2017 at Unknown time   • gabapentin (NEURONTIN) 300 MG capsule Take 1 capsule by mouth 2 (Two) Times a Day. 60 capsule 5 8/10/2017 at Unknown time   • HUMALOG KWIKPEN 100 UNIT/ML solution pen-injector Inject 1 dose as directed Take As Directed.  0 8/10/2017 at Unknown time   • Insulin Pen Needle 31G X 5 MM misc    8/10/2017 at Unknown time   • lisinopril (PRINIVIL,ZESTRIL) 40 MG tablet Take 1 tablet by mouth daily.   8/10/2017 at Unknown time   • Misc Natural Products (TUMERSAID) tablet Take  by mouth Daily.   8/10/2017 at Unknown time   • montelukast (SINGULAIR) 10 MG tablet TAKE 1 TABLET BY MOUTH AT NIGHT 30 tablet 2 8/9/2017 at Unknown time   • omeprazole (PriLOSEC) 20 MG capsule Take 1 capsule by mouth daily.   8/9/2017 at Unknown time   • potassium chloride (K-DUR,KLOR-CON) 20 MEQ CR tablet Take 1 tablet by mouth daily.   8/10/2017 at Unknown time   • promethazine (PHENERGAN) 25 MG tablet Take 1 tablet by mouth As  "Needed.  0 Taking   • ranolazine (RANEXA) 500 MG 12 hr tablet Take 1 tablet by mouth 2 (Two) Times a Day. 60 tablet 11 8/10/2017 at Unknown time   • simvastatin (ZOCOR) 20 MG tablet Take 1 tablet by mouth daily. In the evening   8/9/2017 at Unknown time   • topiramate (TOPAMAX) 100 MG tablet Take 100 mg by mouth Daily.   8/9/2017 at Unknown time   • TOUJEO SOLOSTAR 300 UNIT/ML solution pen-injector Inject 50 unit marking on U-100 syringe as directed Daily.  0 8/9/2017 at Unknown time   • Vortioxetine HBr (TRINTELLIX) 10 MG tablet Take 10 mg by mouth Daily.   8/10/2017 at Unknown time   • prednisoLONE acetate (PRED FORTE) 1 % ophthalmic suspension INSTILL 1 DROP INTO BOTH EYES 4 TIMES DAILY  0 Taking    and Allergies:  Sulfa antibiotics; Codeine; and Morphine    Objective     Vital Sign Min/Max for last 24 hours  Temp  Min: 98.2 °F (36.8 °C)  Max: 98.5 °F (36.9 °C)   BP  Min: 132/76  Max: 153/96   Pulse  Min: 72  Max: 80   Resp  Min: 16  Max: 19   SpO2  Min: 95 %  Max: 98 %   Flow (L/min)  Min: 2  Max: 2   Weight  Min: 260 lb (118 kg)  Max: 279 lb 4.8 oz (127 kg)     Flowsheet Rows         First Filed Value    Admission Height  70\" (177.8 cm) Documented at 08/10/2017 1916    Admission Weight  260 lb (118 kg) Documented at 08/10/2017 1916             Ejection Fraction  Lab Results   Component Value Date    EF 65 07/03/2017       Echo EF Estimated  Lab Results   Component Value Date    ECHOEFEST 60 06/19/2017       Physical Exam:   Physical Exam   Constitutional: He is oriented to person, place, and time. He appears well-developed and well-nourished.   HENT:   Head: Normocephalic and atraumatic.   Mouth/Throat: Oropharynx is clear and moist.   Eyes: Conjunctivae and EOM are normal. Pupils are equal, round, and reactive to light. No scleral icterus.   Neck: Normal range of motion. Neck supple. No JVD present. No tracheal deviation present. No thyromegaly present.   Cardiovascular: Normal rate, regular rhythm, S1 normal, " S2 normal, normal heart sounds, intact distal pulses and normal pulses.  PMI is not displaced.  Exam reveals no gallop and no friction rub.    No murmur heard.  Pulmonary/Chest: Effort normal and breath sounds normal. No respiratory distress. He has no wheezes. He has no rales.   Abdominal: Soft. Bowel sounds are normal. He exhibits no distension and no mass. There is no tenderness. There is no rebound and no guarding.   Musculoskeletal: Normal range of motion. He exhibits no edema or deformity.   Neurological: He is alert and oriented to person, place, and time. He displays normal reflexes. No cranial nerve deficit. Coordination normal.   Skin: Skin is warm and dry. No rash noted. No erythema.   Psychiatric: He has a normal mood and affect. His behavior is normal. Thought content normal.       Results Review:   I reviewed the patient's new clinical results.    Results from last 7 days  Lab Units 08/11/17  0603 08/10/17  1929   WBC 10*3/mm3 5.65 6.38   HEMOGLOBIN g/dL 13.0* 13.8*   HEMATOCRIT % 38.2* 40.8*   PLATELETS 10*3/mm3 153 193       Results from last 7 days  Lab Units 08/10/17  1929   SODIUM mmol/L 139   POTASSIUM mmol/L 3.9   CHLORIDE mmol/L 104   CO2 mmol/L 24.0*   BUN mg/dL 15   CREATININE mg/dL 0.70   GLUCOSE mg/dL 214*   CALCIUM mg/dL 9.3      TROPONINT: < 0.012 x 3 sets          Assessment/Plan     Principal Problem:    Chest pain  Active Problems:    Coronary artery disease involving native coronary artery of native heart with unstable angina pectoris    Hypercholesterolemia    Diabetic polyneuropathy associated with type 2 diabetes mellitus    Shortness of breath    GERD (gastroesophageal reflux disease)    Cervical radiculopathy    Anxiety    Diabetes mellitus    Lower back pain    Essential hypertension    Abnormal stress test      The patient has known coronary artery disease [place]  He has had subsequent cardiac catheterization disclosing a severe stenosis  in a diagonal branch which was initially treated medically.  He has had escalation of his angina pectoris refractory to multiple antianginal therapies.  The patient underwent a vasodilator nuclear stress test which showed anterior and lateral ischemia.  The patient's medical antianginal therapy has been maximized and he is currently taking 3 antianginals without improvement of symptoms.  Given this scenario we have recommended cardiac catheterization with interventional standby.  The procedure has been explained in detail to the patient including risks benefits and alternatives.  The patient expresses understanding and a desires to proceed.  Further recommendations will be predicated on the results of his catheterization findings.    I discussed the patients findings and my recommendations with patient    Dallas Kim MD  08/11/17  6:25 AM

## 2017-08-11 NOTE — H&P
[facility] Medicine Services  HISTORY AND PHYSICAL    Primary Care Physician: Ottoniel Toledo MD    Subjective     Chief Complaint:  Chest pain  Chief Complaint   Patient presents with   • Chest Pain       History of Present Illness:   This is a 57 year old gentleman with known CAD previously has stent placed in LAD, failed stress last week was scheduled with Dr Kim to get cath and stent placement vs balloon angioplasty performed on the 15th, next Tuesday but chest pain was intolerable this evening and so far troponins are negative x 2 with one scheduled in am.  He is comfortable in bed, says the dilaudid in ER helped for a couple of hours but now he is beginning to hurt again, mid chest pressure like pain.  He is a diabetic on high dose Toujeo wife if going to bring it to the hospital in the morning as we dont have it here.   He is allergic to morphine, causes his pain to worsen per patient.  He has hypercholesterolemia, HTN, GERD, polyneuropathy from DM2, anxiety, chronic low back pain, abnormal stress test, edema becoming refractory to current dose of home lasix, has had seizure disorder in past on topamax.    He is also allergic to sulfa and codeine.  His chest pain does not radiate currently to neck jaw nor arm.      Review of Systems   1. Constitutional: Negative for fever. Negative for chills, diaphoresis, fatigue and unexpected weight change.   2. HENT: Negative for congestion and hearing loss.   3. Eyes: Negative for redness and visual disturbance.   4. Respiratory: negative for shortness of breath. positive for chest pain . Negative for cough and chest tightness.   5. Cardiovascular: positive for chest pain and palpitations.   6. Gastrointestinal: Negative for abdominal distention, abdominal pain and blood in stool.   7. Endocrine: Negative for cold intolerance and heat intolerance.   8. Genitourinary: Negative for difficulty urinating, dysuria and frequency.    9. Musculoskeletal: Negative for arthralgias, back pain and myalgias.   10. Skin: Negative for color change, rash and wound.   11. Neurological: Negative for syncope, weakness and headaches.   12. Hematological: Negative for adenopathy. Does not bruise/bleed easily.   13. Psychiatric/Behavioral: Negative for confusion. The patient is not nervous/anxious.     Past Medical History:   Past Medical History:   Diagnosis Date   • Anxiety    • Arthritis    • Asthma    • BiPAP (biphasic positive airway pressure) dependence    • Brachial neuritis 1/19/2017   • CAD in native artery    • Cardiac disorder    • Chest pain    • COPD (chronic obstructive pulmonary disease)    • Depression    • Diabetes mellitus    • Dizziness    • Edema    • GERD (gastroesophageal reflux disease)    • H/O chest x-ray 07/04/2015    Mild Left base atelectasis   • H/O echocardiogram 07/05/2015    Normal LVSF. EF of 60-65%. Grade 1 diastolic dysfunction of the LV myocardium. No evidence of pericardial effusion   • H/O exercise stress test    • History of herniated intervertebral disc     History of left L5-S1 disc herniation   • Hypertension    • LOM (loss of memory) 1/19/2017   • Lower back pain     Right   • Measles    • Obstructive sleep apnea    • Palpitations    • Seizure disorder    • Shortness of breath 1/19/2017   • SOB (shortness of breath)    • Stroke    • Wears glasses        Past Surgical History:  Past Surgical History:   Procedure Laterality Date   • BACK SURGERY     • CARDIAC CATHETERIZATION     • CARDIOVASCULAR STRESS TEST  07/03/2017    WITH DR HERNANDEZ AT [facility]   • CARPAL TUNNEL RELEASE Right    • CATARACT EXTRACTION W/ INTRAOCULAR LENS IMPLANT Right 6/12/2017    Procedure: CATARACT PHACO EXTRACTION WITH INTRAOCULAR LENS IMPLANT RIGHT ;  Surgeon: Natalie Cao MD;  Location: [facility] OR;  Service:    • CATARACT EXTRACTION W/ INTRAOCULAR LENS IMPLANT Left 7/10/2017    Procedure: CATARACT PHACO EXTRACTION WITH INTRAOCULAR LENS IMPLANT  "LEFT;  Surgeon: Natalie Cao MD;  Location: [facility];  Service:    • CHOLECYSTECTOMY     • COLONOSCOPY     • CORONARY ANGIOPLASTY WITH STENT PLACEMENT     • CORONARY ANGIOPLASTY WITH STENT PLACEMENT      X1-LAD   • ENDOSCOPY     • KNEE ARTHROSCOPY Bilateral    • NEUROPLASTY / TRANSPOSITION ULNAR NERVE AT ELBOW     • OTHER SURGICAL HISTORY      Foraminotomy and discectomy       Family History: family history includes Alcohol abuse in his father; Heart disease in his other, other, and other; Hypertension in his mother, other, other, and other; Other in his other; Parkinsonism in his other; Stroke in his other, other, and other.    Social History:  reports that he quit smoking about 19 years ago. His smoking use included Cigarettes. He quit smokeless tobacco use about 2 months ago. His smokeless tobacco use included Snuff. He reports that he drinks alcohol. He reports that he does not use illicit drugs.    Medications:    (Not in a hospital admission)    Allergies:  Allergies   Allergen Reactions   • Sulfa Antibiotics Anaphylaxis, Nausea And Vomiting and Delirium   • Codeine Nausea And Vomiting     Can only take with Phenergan   • Morphine      Does not work. It causes pain         Objective     Physical Exam:  Vital Signs: Pulse 80  Temp 98.2 °F (36.8 °C) (Oral)   Resp 19  Ht 70\" (177.8 cm)  Wt 260 lb (118 kg)  SpO2 98%  BMI 37.31 kg/m2     General Appearance: alert, oriented x 3, no acute distress.  Skin: warm and dry.  HEENT: pupils round and reactive to light, oral mucosa normal.  Neck: supple, no JVD, trachea midline.  Lungs: CTA, unlabored breathing effort.  Heart: RRR, normal S1 and S2, no S3, no rub.  Abdomen: soft, non-tender, no palpable bladder, present bowel sounds to auscultation.  Extremities: no clubbing, cyanosis with 1+ pitting BL edema LEs.  Neuro: normal speech and mental status.          Results Reviewed:    Lab Results (last 24 hours)     Procedure Component Value Units Date/Time    CBC & " Differential [[id number]] Collected:  08/10/17 1929    Specimen:  Blood Updated:  08/10/17 1938    Narrative:       The following orders were created for panel order CBC & Differential.  Procedure                               Abnormality         Status                     ---------                               -----------         ------                     CBC Auto Differential[[id number]]        Abnormal            Final result                 Please view results for these tests on the individual orders.    CBC Auto Differential [[id number]]  (Abnormal) Collected:  08/10/17 1929    Specimen:  Blood Updated:  08/10/17 1938     WBC 6.38 10*3/mm3      RBC 4.95 10*6/mm3      Hemoglobin 13.8 (L) g/dL      Hematocrit 40.8 (L) %      MCV 82.4 fL      MCH 27.9 pg      MCHC 33.8 g/dL      RDW 12.4 %      RDW-SD 37.6 fl      MPV 10.8 fL      Platelets 193 10*3/mm3      Neutrophil % 52.0 %      Lymphocyte % 34.3 %      Monocyte % 7.7 %      Eosinophil % 3.6 %      Basophil % 0.8 %      Immature Grans % 1.6 (H) %      Neutrophils, Absolute 3.32 10*3/mm3      Lymphocytes, Absolute 2.19 10*3/mm3      Monocytes, Absolute 0.49 10*3/mm3      Eosinophils, Absolute 0.23 10*3/mm3      Basophils, Absolute 0.05 10*3/mm3      Immature Grans, Absolute 0.10 (H) 10*3/mm3      nRBC 0.0 /100 WBC     Troponin I-Stat [[id number]]  (Normal) Collected:  08/10/17 1929    Specimen:  Blood Updated:  08/10/17 1947     Troponin I 0.000 ng/mL     Narrative:       Normal Patient Upper Reference Limit (URL) (99th Percentile)=0.05 ng/mL   Non-AMI Illness Reference Limit=0.05-0.11 ng/mL   AMI Confirmation=0.12 ng/mL and above    Comprehensive Metabolic Panel [[id number]]  (Abnormal) Collected:  08/10/17 1929    Specimen:  Blood Updated:  08/10/17 2004     Glucose 214 (H) mg/dL       Glucose >180, Hemoglobin A1C recommended.        BUN 15 mg/dL      Creatinine 0.70 mg/dL      Sodium 139 mmol/L      Potassium 3.9 mmol/L      Chloride 104 mmol/L      CO2 24.0  (L) mmol/L      Calcium 9.3 mg/dL      Total Protein 6.8 g/dL      Albumin 4.30 g/dL      ALT (SGPT) 81 (H) U/L      AST (SGOT) 47 (H) U/L      Alkaline Phosphatase 61 U/L      Total Bilirubin 0.7 mg/dL      eGFR Non African Amer 117 mL/min/1.73      Globulin 2.5 gm/dL      A/G Ratio 1.7 g/dL      BUN/Creatinine Ratio 21.4     Anion Gap 14.9 mmol/L     Narrative:       Abnormal estimated GFR should be followed by more specific studies to confirm end stage chronic renal disease. The equation used for calculation may not be accurate for patients less than 19 years old, greater than 70 years old, patients at extremes of weight, malnutrition, or with acute renal dysfunction.    Magnesium [[id number]]  (Normal) Collected:  08/10/17 1929    Specimen:  Blood Updated:  08/10/17 2004     Magnesium 1.8 mg/dL     BNP [[id number]]  (Normal) Collected:  08/10/17 1929    Specimen:  Blood Updated:  08/10/17 2004     proBNP 35.8 pg/mL     Troponin [[id number]]  (Normal) Collected:  08/10/17 1929    Specimen:  Blood Updated:  08/10/17 2019     Troponin I <0.012 ng/mL     Narrative:       Normal Patient Upper Reference Limit (URL) (99th Percentile)=0.03 ng/mL   Non-AMI Illness Reference Limit=0.03-0.11 ng/mL   AMI Confirmation=0.12 ng/mL and above    [place] Draw [[id number]] Collected:  08/10/17 1929    Specimen:  Blood Updated:  08/10/17 2101    Narrative:       The following orders were created for panel order [place] Draw.  Procedure                               Abnormality         Status                     ---------                               -----------         ------                     Light Blue Top[[id number]]                                   Final result               Gold Top - SST[[id number]]                                   Final result               Green Top (No Gel)[[id number]]                               Final result                 Please view results for these tests on the individual orders.    Light Blue Top  [[id number]] Collected:  08/10/17 1929    Specimen:  Blood Updated:  08/10/17 2101     Extra Tube hold for add-on      Auto resulted       Gold Top - SST [[id number]] Collected:  08/10/17 1929    Specimen:  Blood Updated:  08/10/17 2101     Extra Tube Hold for add-ons.      Auto resulted.       Green Top (No Gel) [[id number]] Collected:  08/10/17 1929    Specimen:  Blood Updated:  08/10/17 2101     Extra Tube Hold for add-ons.      Auto resulted.             I have personally reviewed and interpreted available lab data, radiology studies and ECG obtained at time of admission.     Assessment / Plan     Assessment/Problem List:   Principal Problem:    Chest pain  Active Problems:    Coronary artery disease involving native coronary artery of native heart with unstable angina pectoris    Hypercholesterolemia    Diabetic polyneuropathy associated with type 2 diabetes mellitus    Shortness of breath    GERD (gastroesophageal reflux disease)    Cervical radiculopathy    Anxiety    Diabetes mellitus    Lower back pain    Essential hypertension    Abnormal stress test          Plan:  Admit to telemetry  Dr Kim will cath tomorrow  NPO after midnight, cath in am, cardiac and diabetic diet to resume when cleared by cardiology.  No lovenox at this time, is on plavix  Diabetic and cardiac diet until midnight then NPO  Continue  medications, aspirin, norvasc, atenolol, zocor  Dilaudid 0.5 mg q 4 hr prn for pain  NTG paste one inch in ER  Full code  SCDs for VTE prophylaxis        Praveen Santo DO 08/10/17 9:12 PM  25 minutes    Please note that portions of this note may have been completed with a voice recognition program. Efforts were made to edit the dictations, but occasionally words are mistranscribed.

## 2017-08-11 NOTE — CONSULTS
[facility] Consult Note    Referring Provider: Denise  Reason for Consultation: Unstable Angina    Patient Care Team:  Ottoniel Toledo MD as PCP - General (Family Medicine)    Chief complaint : Chest pain    Subjective .     History of present illness:  This is a 57-year-old male patient with known coronary artery disease who initially presented with an escalation of angina.  The patient underwent an outpatient vasodilator nuclear stress test which showed anterior lateral wall ischemia.  The patient's medical antianginal therapies was escalated using a combination of beta blockade, calcium channel blockers and Ranexa.  He was intolerant to long-acting nitroglycerin due to severe headaches.  The patient presented to the emergency room with worsening chest discomfort shortness of breath and peripheral edema.  The patient indicates that his chest discomfort is a pressure sensation diffusely across his central chest which was previously occurring with exertion but now occurs both with minimal exertion as well as at rest.  The patient indicates that the quality is identical to that which she was experiencing prior to placing a stent in his left anterior descending in [place] several years ago.  The patient also has worsening shortness of breath both at rest as well as minimal activity.  The frequency duration and intensity of his chest discomfort and shortness of breath have all escalated culminating in class IV symptoms.  He has also noticed increased swelling in his feet and ankles.  He has no orthopnea or PND.  There is no dizziness palpitations or syncope.  The patient is a former smoker but has not smoked since 1998.  He has no history of diabetes but does have significant hypertension.  His initial 12-lead electrocardiogram showed no high-risk ischemic ST-T wave changes or injury current.  His troponins have been serially less than 0.012.    Review of Systems   Review of Systems   Constitution: Negative for chills,  diaphoresis, fever, weakness, malaise/fatigue, night sweats, weight gain and weight loss.   HENT: Negative for ear discharge, hearing loss, hoarse voice and nosebleeds.    Eyes: Negative for discharge, double vision, pain and photophobia.   Cardiovascular: Positive for chest pain, dyspnea on exertion and leg swelling. Negative for claudication, cyanosis, irregular heartbeat, near-syncope, orthopnea, palpitations, paroxysmal nocturnal dyspnea and syncope.   Respiratory: Positive for shortness of breath. Negative for cough, hemoptysis, sputum production and wheezing.    Endocrine: Negative for cold intolerance, heat intolerance, polydipsia, polyphagia and polyuria.   Hematologic/Lymphatic: Negative for adenopathy and bleeding problem. Does not bruise/bleed easily.   Skin: Negative for color change, flushing, itching and rash.   Musculoskeletal: Negative for muscle cramps, muscle weakness, myalgias and stiffness.   Gastrointestinal: Negative for abdominal pain, diarrhea, hematemesis, hematochezia, nausea and vomiting.   Genitourinary: Negative for dysuria, frequency and nocturia.   Neurological: Negative for dizziness, focal weakness, loss of balance, numbness, paresthesias and seizures.   Psychiatric/Behavioral: Negative for altered mental status, hallucinations and suicidal ideas.   Allergic/Immunologic: Negative for HIV exposure, hives and persistent infections.       History  Past Medical History:   Diagnosis Date   • Anxiety    • Arthritis    • Asthma    • BiPAP (biphasic positive airway pressure) dependence    • Brachial neuritis 1/19/2017   • CAD in native artery    • Cardiac disorder    • Chest pain    • COPD (chronic obstructive pulmonary disease)    • Depression    • Diabetes mellitus    • Dizziness    • Edema    • GERD (gastroesophageal reflux disease)    • H/O chest x-ray 07/04/2015    Mild Left base atelectasis   • H/O echocardiogram 07/05/2015    Normal LVSF. EF of 60-65%. Grade 1 diastolic dysfunction of  the LV myocardium. No evidence of pericardial effusion   • H/O exercise stress test    • History of herniated intervertebral disc     History of left L5-S1 disc herniation   • Hypertension    • LOM (loss of memory) 1/19/2017   • Lower back pain     Right   • Measles    • Obstructive sleep apnea    • Palpitations    • Seizure disorder    • Shortness of breath 1/19/2017   • SOB (shortness of breath)    • Stroke    • Wears glasses    , Past Surgical History:   Procedure Laterality Date   • BACK SURGERY     • CARDIAC CATHETERIZATION     • CARDIOVASCULAR STRESS TEST  07/03/2017    WITH DR HERNANDEZ AT [facility]   • CARPAL TUNNEL RELEASE Right    • CATARACT EXTRACTION W/ INTRAOCULAR LENS IMPLANT Right 6/12/2017    Procedure: CATARACT PHACO EXTRACTION WITH INTRAOCULAR LENS IMPLANT RIGHT ;  Surgeon: Natalie Cao MD;  Location: [facility];  Service:    • CATARACT EXTRACTION W/ INTRAOCULAR LENS IMPLANT Left 7/10/2017    Procedure: CATARACT PHACO EXTRACTION WITH INTRAOCULAR LENS IMPLANT LEFT;  Surgeon: Natalie Cao MD;  Location: [facility];  Service:    • CHOLECYSTECTOMY     • COLONOSCOPY     • CORONARY ANGIOPLASTY WITH STENT PLACEMENT     • CORONARY ANGIOPLASTY WITH STENT PLACEMENT      X1-LAD   • ENDOSCOPY     • EYE SURGERY      cataract surgery both eyes   • KNEE ARTHROSCOPY Bilateral    • NEUROPLASTY / TRANSPOSITION ULNAR NERVE AT ELBOW     • OTHER SURGICAL HISTORY      Foraminotomy and discectomy   , Family History   Problem Relation Age of Onset   • Hypertension Mother    • Arthritis Mother    • Alcohol abuse Father    • Heart disease Other    • Stroke Other    • Hypertension Other    • Other Other      Neurologic disorder   • Parkinsonism Other    • Stroke Other    • Heart disease Other    • Hypertension Other    • Heart disease Other    • Stroke Other    • Hypertension Other    , Social History   Substance Use Topics   • Smoking status: Former Smoker     Types: Cigarettes     Quit date: 1/1/1998   • Smokeless tobacco: Former  User     Types: Snuff     Quit date: 5/24/2017   • Alcohol use Yes      Comment: 3 PER YEAR   , Prescriptions Prior to Admission   Medication Sig Dispense Refill Last Dose   • albuterol (PROVENTIL HFA;VENTOLIN HFA) 108 (90 BASE) MCG/ACT inhaler Inhale 2 puffs Every 4 (Four) Hours As Needed. ProAir  (90 Base) MCG/ACT Inhalation Aerosol Solution; Patient Sig: ProAir  (90 Base) MCG/ACT Inhalation Aerosol Solution ; 8; 0; 05-Oct-2015; Active   Taking   • amLODIPine (NORVASC) 2.5 MG tablet Take 2 tablets by mouth Daily. 30 tablet 11 8/10/2017 at Unknown time   • aspirin 81 MG tablet Take 1 tablet by mouth daily.   8/10/2017 at Unknown time   • atenolol (TENORMIN) 25 MG tablet Take 1 tablet by mouth daily.   8/10/2017 at Unknown time   • Cetirizine HCl 10 MG capsule Take 1 capsule by mouth daily.   8/9/2017 at Unknown time   • clopidogrel (PLAVIX) 75 MG tablet Take 1 tablet by mouth daily.   8/9/2017 at Unknown time   • furosemide (LASIX) 40 MG tablet Take 1 tablet by mouth daily.   8/10/2017 at Unknown time   • gabapentin (NEURONTIN) 300 MG capsule Take 1 capsule by mouth 2 (Two) Times a Day. 60 capsule 5 8/10/2017 at Unknown time   • HUMALOG KWIKPEN 100 UNIT/ML solution pen-injector Inject 1 dose as directed Take As Directed.  0 8/10/2017 at Unknown time   • Insulin Pen Needle 31G X 5 MM misc    8/10/2017 at Unknown time   • lisinopril (PRINIVIL,ZESTRIL) 40 MG tablet Take 1 tablet by mouth daily.   8/10/2017 at Unknown time   • Misc Natural Products (TUMERSAID) tablet Take  by mouth Daily.   8/10/2017 at Unknown time   • montelukast (SINGULAIR) 10 MG tablet TAKE 1 TABLET BY MOUTH AT NIGHT 30 tablet 2 8/9/2017 at Unknown time   • omeprazole (PriLOSEC) 20 MG capsule Take 1 capsule by mouth daily.   8/9/2017 at Unknown time   • potassium chloride (K-DUR,KLOR-CON) 20 MEQ CR tablet Take 1 tablet by mouth daily.   8/10/2017 at Unknown time   • promethazine (PHENERGAN) 25 MG tablet Take 1 tablet by mouth As  "Needed.  0 Taking   • ranolazine (RANEXA) 500 MG 12 hr tablet Take 1 tablet by mouth 2 (Two) Times a Day. 60 tablet 11 8/10/2017 at Unknown time   • simvastatin (ZOCOR) 20 MG tablet Take 1 tablet by mouth daily. In the evening   8/9/2017 at Unknown time   • topiramate (TOPAMAX) 100 MG tablet Take 100 mg by mouth Daily.   8/9/2017 at Unknown time   • TOUJEO SOLOSTAR 300 UNIT/ML solution pen-injector Inject 50 unit marking on U-100 syringe as directed Daily.  0 8/9/2017 at Unknown time   • Vortioxetine HBr (TRINTELLIX) 10 MG tablet Take 10 mg by mouth Daily.   8/10/2017 at Unknown time   • prednisoLONE acetate (PRED FORTE) 1 % ophthalmic suspension INSTILL 1 DROP INTO BOTH EYES 4 TIMES DAILY  0 Taking    and Allergies:  Sulfa antibiotics; Codeine; and Morphine    Objective     Vital Sign Min/Max for last 24 hours  Temp  Min: 98.2 °F (36.8 °C)  Max: 98.5 °F (36.9 °C)   BP  Min: 132/76  Max: 153/96   Pulse  Min: 72  Max: 80   Resp  Min: 16  Max: 19   SpO2  Min: 95 %  Max: 98 %   Flow (L/min)  Min: 2  Max: 2   Weight  Min: 260 lb (118 kg)  Max: 279 lb 4.8 oz (127 kg)     Flowsheet Rows         First Filed Value    Admission Height  70\" (177.8 cm) Documented at 08/10/2017 1916    Admission Weight  260 lb (118 kg) Documented at 08/10/2017 1916             Ejection Fraction  Lab Results   Component Value Date    EF 65 07/03/2017       Echo EF Estimated  Lab Results   Component Value Date    ECHOEFEST 60 06/19/2017       Physical Exam:   Physical Exam   Constitutional: He is oriented to person, place, and time. He appears well-developed and well-nourished.   HENT:   Head: Normocephalic and atraumatic.   Mouth/Throat: Oropharynx is clear and moist.   Eyes: Conjunctivae and EOM are normal. Pupils are equal, round, and reactive to light. No scleral icterus.   Neck: Normal range of motion. Neck supple. No JVD present. No tracheal deviation present. No thyromegaly present.   Cardiovascular: Normal rate, regular rhythm, S1 normal, " S2 normal, normal heart sounds, intact distal pulses and normal pulses.  PMI is not displaced.  Exam reveals no gallop and no friction rub.    No murmur heard.  Pulmonary/Chest: Effort normal and breath sounds normal. No respiratory distress. He has no wheezes. He has no rales.   Abdominal: Soft. Bowel sounds are normal. He exhibits no distension and no mass. There is no tenderness. There is no rebound and no guarding.   Musculoskeletal: Normal range of motion. He exhibits no edema or deformity.   Neurological: He is alert and oriented to person, place, and time. He displays normal reflexes. No cranial nerve deficit. Coordination normal.   Skin: Skin is warm and dry. No rash noted. No erythema.   Psychiatric: He has a normal mood and affect. His behavior is normal. Thought content normal.       Results Review:   I reviewed the patient's new clinical results.    Results from last 7 days  Lab Units 08/11/17  0603 08/10/17  1929   WBC 10*3/mm3 5.65 6.38   HEMOGLOBIN g/dL 13.0* 13.8*   HEMATOCRIT % 38.2* 40.8*   PLATELETS 10*3/mm3 153 193       Results from last 7 days  Lab Units 08/10/17  1929   SODIUM mmol/L 139   POTASSIUM mmol/L 3.9   CHLORIDE mmol/L 104   CO2 mmol/L 24.0*   BUN mg/dL 15   CREATININE mg/dL 0.70   GLUCOSE mg/dL 214*   CALCIUM mg/dL 9.3      TROPONINT: < 0.012 x 3 sets          Assessment/Plan     Principal Problem:    Chest pain  Active Problems:    Coronary artery disease involving native coronary artery of native heart with unstable angina pectoris    Hypercholesterolemia    Diabetic polyneuropathy associated with type 2 diabetes mellitus    Shortness of breath    GERD (gastroesophageal reflux disease)    Cervical radiculopathy    Anxiety    Diabetes mellitus    Lower back pain    Essential hypertension    Abnormal stress test      The patient has known coronary artery disease [place]  He has had subsequent cardiac catheterization disclosing a severe stenosis  in a diagonal branch which was initially treated medically.  He has had escalation of his angina pectoris refractory to multiple antianginal therapies.  The patient underwent a vasodilator nuclear stress test which showed anterior and lateral ischemia.  The patient's medical antianginal therapy has been maximized and he is currently taking 3 antianginals without improvement of symptoms.  Given this scenario we have recommended cardiac catheterization with interventional standby.  The procedure has been explained in detail to the patient including risks benefits and alternatives.  The patient expresses understanding and a desires to proceed.  Further recommendations will be predicated on the results of his catheterization findings.    I discussed the patients findings and my recommendations with patient    Dallas Kim MD  08/11/17  6:25 AM

## 2017-08-11 NOTE — DISCHARGE SUMMARY
[facility]   DISCHARGE SUMMARY    Name:  Denzel Harding   Age:  57 y.o.  Sex:  male  :  1960  MRN:  [id number]   Visit Number:  [id number]  Primary Care Physician:  Ottoniel Toledo MD  Date of Discharge:  2017  Admission Date:  8/10/2017      Presenting Problem:    Chest pain, unspecified type [R07.9]       Discharge Diagnosis:     Principal Problem:    Chest pain  Active Problems:    Coronary artery disease involving native coronary artery of native heart with unstable angina pectoris    Hypercholesterolemia    Diabetic polyneuropathy associated with type 2 diabetes mellitus    Shortness of breath    GERD (gastroesophageal reflux disease)    Cervical radiculopathy    Anxiety    Diabetes mellitus    Lower back pain    Essential hypertension    Abnormal stress test        Past Medical History:  Past Medical History:   Diagnosis Date   • Anxiety    • Arthritis    • Asthma    • BiPAP (biphasic positive airway pressure) dependence    • Brachial neuritis 2017   • CAD in native artery    • Cardiac disorder    • Chest pain    • COPD (chronic obstructive pulmonary disease)    • Depression    • Diabetes mellitus    • Dizziness    • Edema    • GERD (gastroesophageal reflux disease)    • H/O chest x-ray 2015    Mild Left base atelectasis   • H/O echocardiogram 2015    Normal LVSF. EF of 60-65%. Grade 1 diastolic dysfunction of the LV myocardium. No evidence of pericardial effusion   • H/O exercise stress test    • History of herniated intervertebral disc     History of left L5-S1 disc herniation   • Hypertension    • LOM (loss of memory) 2017   • Lower back pain     Right   • Measles    • Obstructive sleep apnea    • Palpitations    • Seizure disorder    • Shortness of breath 2017   • SOB (shortness of breath)    • Stroke    • Wears glasses          Consults:     Consults     Date and Time Order Name Status Description    2017 0037 Inpatient Consult to  Cardiology Completed           Procedures Performed:    Procedure(s):  Coronary angiography  Left Heart Cath  Left ventriculography         History of presenting illness:  This is a 57-year-old male with a known history of coronary artery disease who presented with complaints of unstable angina.  He had underwent an outpatient nuclear stress test which showed some anterior lateral wall ischemia.  His medical therapy was escalated by Dr. Akins at that time.  He was intolerant to long-acting nitroglycerin due to severe headaches.  He presented to the emergency room with worsening chest discomfort shortness of breath and edema.  On evaluation in the emergency room EKG was done which did not show any ST or T-wave changes.  Troponin was negative.  He was admitted to the hospitalist service for further medical management.    Hospital Course:  Upon admission to the hospitalist service Dr. Akins was consulted.  Patient was made nothing by mouth when he was taken down this morning for a heart catheterization.  The left main coronary artery had no significant disease.  The LAD was heavily calcified to 3 diagonal branches.  The circumflex coronary artery in the second obtuse marginal branch had 75-80% stenosis in the RCA and greater than 75% stenosis.  His ejection fraction was greater than 60%.  Dr. Kim has spoken with Dr. Damon as it felt patient will need coronary artery bypass surgery.  Dr. Damon has accepted the patient for transfer.  He is currently lying in bed and is hemodynamically stable.      Vital Signs:    Temp:  [97.8 °F (36.6 °C)-98.5 °F (36.9 °C)] 97.8 °F (36.6 °C)  Heart Rate:  [72-80] 77  Resp:  [16-19] 16  BP: (128-153)/(70-96) 146/78    Physical Exam:  General Appearance:    Alert and cooperative, not in any acute distress.   Head:    Atraumatic and normocephalic, without obvious abnormality.   Eyes:            PERRLA, conjunctivae and sclerae normal, no Icterus. No pallor. Extra-occular  movements are within normal limits.   Ears:    Ears appear intact with no abnormalities noted.   Throat:   No oral lesions, no thrush, oral mucosa moist.   Neck:   Supple, trachea midline, no thyromegaly, no carotid bruit.   Back:     No kyphoscoliosis present. No tenderness to palpation,   range of motion normal.   Lungs:     Chest shape is normal. Breath sounds heard bilaterally equally.  No crackles or wheezing. No Pleural rub or bronchial breathing.    Heart:    Normal S1 and S2, no murmur, no gallop, no rub. No JVD   Abdomen:     Normal bowel sounds, no masses, no organomegaly. Soft        non-tender, non-distended, no guarding, no rebound                tenderness   Extremities:   Moves all extremities well, no edema, no cyanosis, no             Clubbing.  TR band left wrist.   Pulses:   Pulses palpable and equal bilaterally   Skin:   No bleeding, bruising or rash   Lymph nodes:  Psychiatric/Behavior:    No palpable adenopathy    Normal mood, normal behavior   Neurologic:   Cranial nerves 2 - 12 grossly intact, sensation intact, Motor power is normal and equal bilaterally.           Pertinent Lab Results:       Results from last 7 days  Lab Units 08/11/17  0603 08/10/17  1929   SODIUM mmol/L 140 139   POTASSIUM mmol/L 4.1 3.9   CHLORIDE mmol/L 109* 104   CO2 mmol/L 22.0* 24.0*   BUN mg/dL 13 15   CREATININE mg/dL 0.60 0.70   CALCIUM mg/dL 8.8 9.3   BILIRUBIN mg/dL  --  0.7   ALK PHOS U/L  --  61   ALT (SGPT) U/L  --  81*   AST (SGOT) U/L  --  47*   GLUCOSE mg/dL 185* 214*       Results from last 7 days  Lab Units 08/11/17  0603 08/10/17  1929   WBC 10*3/mm3 5.65 6.38   HEMOGLOBIN g/dL 13.0* 13.8*   HEMATOCRIT % 38.2* 40.8*   PLATELETS 10*3/mm3 153 193                Pertinent Radiology Results:    Imaging Results (all)     Procedure Component Value Units Date/Time    XR Chest 1 View [[id number]] Collected:  08/11/17 0818     Updated:  08/11/17 0821    Narrative:       PROCEDURE: XR CHEST 1 VW-     HISTORY: CP      COMPARISON: July 27, 2016.     FINDINGS: The heart is normal in size. The mediastinum is unremarkable.  The lungs are clear. There is no pneumothorax.  There are no acute  osseous abnormalities.           Impression:       No acute cardiopulmonary process.     Continued followup is recommended.     This report was finalized on 8/11/2017 8:19 AM by Harrison Solomon DO.          Condition on Discharge:    Stable        Discharge Disposition:    Short Term Hospital (DC - External)    Discharge Medication:     Denzel Harding   Home Medication Instructions MARGARITA:[id number]    Printed on:08/11/17 0914   Medication Information                      albuterol (PROVENTIL HFA;VENTOLIN HFA) 108 (90 BASE) MCG/ACT inhaler  Inhale 2 puffs Every 4 (Four) Hours As Needed. ProAir  (90 Base) MCG/ACT Inhalation Aerosol Solution; Patient Sig: ProAir  (90 Base) MCG/ACT Inhalation Aerosol Solution ; 8; 0; 05-Oct-2015; Active             amLODIPine (NORVASC) 2.5 MG tablet  Take 2 tablets by mouth Daily.             aspirin 81 MG tablet  Take 1 tablet by mouth daily.             atenolol (TENORMIN) 25 MG tablet  Take 1 tablet by mouth daily.             Cetirizine HCl 10 MG capsule  Take 1 capsule by mouth daily.             clopidogrel (PLAVIX) 75 MG tablet  Take 1 tablet by mouth daily.             furosemide (LASIX) 40 MG tablet  Take 1 tablet by mouth daily.             gabapentin (NEURONTIN) 300 MG capsule  Take 1 capsule by mouth 2 (Two) Times a Day.             HUMALOG KWIKPEN 100 UNIT/ML solution pen-injector  Inject 1 dose as directed Take As Directed.             Insulin Pen Needle 31G X 5 MM misc               lisinopril (PRINIVIL,ZESTRIL) 40 MG tablet  Take 1 tablet by mouth daily.             Misc Natural Products (TUMERSAID) tablet  Take  by mouth Daily.             montelukast (SINGULAIR) 10 MG tablet  TAKE 1 TABLET BY MOUTH AT NIGHT             omeprazole (PriLOSEC) 20 MG capsule  Take 1 capsule by mouth  daily.             potassium chloride (K-DUR,KLOR-CON) 20 MEQ CR tablet  Take 1 tablet by mouth daily.             prednisoLONE acetate (PRED FORTE) 1 % ophthalmic suspension  INSTILL 1 DROP INTO BOTH EYES 4 TIMES DAILY             ranolazine (RANEXA) 500 MG 12 hr tablet  Take 1 tablet by mouth 2 (Two) Times a Day.             simvastatin (ZOCOR) 20 MG tablet  Take 1 tablet by mouth daily. In the evening             topiramate (TOPAMAX) 100 MG tablet  Take 100 mg by mouth Daily.             TOUJEO SOLOSTAR 300 UNIT/ML solution pen-injector  Inject 50 unit marking on U-100 syringe as directed Daily.             Vortioxetine HBr (TRINTELLIX) 10 MG tablet  Take 10 mg by mouth Daily.                 Discharge Diet: Cardiac        Activity at Discharge:  As tolerates        Follow-up Appointments:    Future Appointments  Date Time Provider Department Center   8/15/2017 10:00 AM Dallas Kim MD [facility] None   9/19/2017 10:30 AM MD CHYNA Banerjee [facility] None         Test Results Pending at Discharge:           ANTONIO Franklin  08/11/17  9:14 AM

## 2017-08-11 NOTE — PROGRESS NOTES
Continued Stay Note  AMANDA Ochoa     Patient Name: Denzel Harding  MRN: [id number]  Today's Date: 8/11/2017    Admit Date: 8/10/2017          Discharge Plan       08/11/17 0827    Case Management/Social Work Plan    Plan Pt is being transferred to [facility], no Valles signed as is commerical insurance.                Discharge Codes     None            Jayne Michele

## 2017-08-12 ENCOUNTER — APPOINTMENT (OUTPATIENT)
Dept: CARDIOLOGY | Facility: HOSPITAL | Age: 57
End: 2017-08-12

## 2017-08-12 ENCOUNTER — APPOINTMENT (OUTPATIENT)
Dept: PULMONOLOGY | Facility: HOSPITAL | Age: 57
End: 2017-08-12

## 2017-08-12 LAB
APTT PPP: 33.5 SECONDS (ref 45–60)
APTT PPP: 45.9 SECONDS (ref 45–60)
APTT PPP: 47.1 SECONDS (ref 45–60)
APTT PPP: 49.3 SECONDS (ref 45–60)
ARTICHOKE IGE QN: 74 MG/DL (ref 0–130)
BH CV ECHO MEAS - AO ROOT AREA (BSA CORRECTED): 1.3
BH CV ECHO MEAS - AO ROOT AREA: 8 CM^2
BH CV ECHO MEAS - AO ROOT DIAM: 3.2 CM
BH CV ECHO MEAS - BSA(HAYCOCK): 2.5 M^2
BH CV ECHO MEAS - BSA(HAYCOCK): 2.5 M^2
BH CV ECHO MEAS - BSA: 2.4 M^2
BH CV ECHO MEAS - BSA: 2.4 M^2
BH CV ECHO MEAS - BZI_BMI: 39 KILOGRAMS/M^2
BH CV ECHO MEAS - BZI_BMI: 39.2 KILOGRAMS/M^2
BH CV ECHO MEAS - BZI_METRIC_HEIGHT: 177.8 CM
BH CV ECHO MEAS - BZI_METRIC_HEIGHT: 177.8 CM
BH CV ECHO MEAS - BZI_METRIC_WEIGHT: 123.4 KG
BH CV ECHO MEAS - BZI_METRIC_WEIGHT: 123.8 KG
BH CV ECHO MEAS - CONTRAST EF (2CH): 75 ML/M^2
BH CV ECHO MEAS - CONTRAST EF 4CH: 57.5 ML/M^2
BH CV ECHO MEAS - EDV(CUBED): 105.8 ML
BH CV ECHO MEAS - EDV(MOD-SP2): 84 ML
BH CV ECHO MEAS - EDV(MOD-SP4): 87 ML
BH CV ECHO MEAS - EDV(TEICH): 103.9 ML
BH CV ECHO MEAS - EF(CUBED): 81.8 %
BH CV ECHO MEAS - EF(MOD-SP2): 75 %
BH CV ECHO MEAS - EF(MOD-SP4): 57.5 %
BH CV ECHO MEAS - EF(TEICH): 74.5 %
BH CV ECHO MEAS - ESV(CUBED): 19.2 ML
BH CV ECHO MEAS - ESV(MOD-SP2): 21 ML
BH CV ECHO MEAS - ESV(MOD-SP4): 37 ML
BH CV ECHO MEAS - ESV(TEICH): 26.5 ML
BH CV ECHO MEAS - FS: 43.3 %
BH CV ECHO MEAS - IVS/LVPW: 0.99
BH CV ECHO MEAS - IVSD: 1.3 CM
BH CV ECHO MEAS - LA DIMENSION: 5.3 CM
BH CV ECHO MEAS - LA/AO: 1.7
BH CV ECHO MEAS - LAT PEAK E' VEL: 7 CM/SEC
BH CV ECHO MEAS - LV DIASTOLIC VOL/BSA (35-75): 36.6 ML/M^2
BH CV ECHO MEAS - LV MASS(C)D: 238.9 GRAMS
BH CV ECHO MEAS - LV MASS(C)DI: 100.4 GRAMS/M^2
BH CV ECHO MEAS - LV SYSTOLIC VOL/BSA (12-30): 15.6 ML/M^2
BH CV ECHO MEAS - LVIDD: 4.7 CM
BH CV ECHO MEAS - LVIDS: 2.7 CM
BH CV ECHO MEAS - LVLD AP2: 9 CM
BH CV ECHO MEAS - LVLD AP4: 8.5 CM
BH CV ECHO MEAS - LVLS AP2: 6.6 CM
BH CV ECHO MEAS - LVLS AP4: 7.8 CM
BH CV ECHO MEAS - LVPWD: 1.3 CM
BH CV ECHO MEAS - MV A MAX VEL: 99.7 CM/SEC
BH CV ECHO MEAS - MV E MAX VEL: 93.3 CM/SEC
BH CV ECHO MEAS - MV E/A: 0.94
BH CV ECHO MEAS - PA ACC SLOPE: 431 CM/SEC^2
BH CV ECHO MEAS - PA ACC TIME: 0.14 SEC
BH CV ECHO MEAS - PA PR(ACCEL): 14.2 MMHG
BH CV ECHO MEAS - SI(CUBED): 36.4 ML/M^2
BH CV ECHO MEAS - SI(MOD-SP2): 26.5 ML/M^2
BH CV ECHO MEAS - SI(MOD-SP4): 21 ML/M^2
BH CV ECHO MEAS - SI(TEICH): 32.5 ML/M^2
BH CV ECHO MEAS - SV(CUBED): 86.6 ML
BH CV ECHO MEAS - SV(MOD-SP2): 63 ML
BH CV ECHO MEAS - SV(MOD-SP4): 50 ML
BH CV ECHO MEAS - SV(TEICH): 77.4 ML
BH CV ECHO MEAS - TAPSE (>1.6): 2.7 CM2
BH CV XLRA - RV BASE: 4.9 CM
BH CV XLRA - RV LENGTH: 9.8 CM
BH CV XLRA - RV MID: 4.1 CM
BH CV XLRA - TDI S': 12 CM/SEC
BH CV XLRA MEAS LEFT CCA RATIO VEL: 83.3 CM/SEC
BH CV XLRA MEAS LEFT DIST CCA EDV: 21.2 CM/SEC
BH CV XLRA MEAS LEFT DIST CCA PSV: 83.3 CM/SEC
BH CV XLRA MEAS LEFT DIST ICA EDV: 25.1 CM/SEC
BH CV XLRA MEAS LEFT DIST ICA PSV: 73.1 CM/SEC
BH CV XLRA MEAS LEFT ICA RATIO VEL: 73.9 CM/SEC
BH CV XLRA MEAS LEFT ICA/CCA RATIO: 0.89
BH CV XLRA MEAS LEFT MID CCA EDV: 18.1 CM/SEC
BH CV XLRA MEAS LEFT MID CCA PSV: 105 CM/SEC
BH CV XLRA MEAS LEFT MID ICA EDV: 21.2 CM/SEC
BH CV XLRA MEAS LEFT MID ICA PSV: 73.9 CM/SEC
BH CV XLRA MEAS LEFT PROX CCA EDV: 16.5 CM/SEC
BH CV XLRA MEAS LEFT PROX CCA PSV: 114 CM/SEC
BH CV XLRA MEAS LEFT PROX ECA PSV: 146 CM/SEC
BH CV XLRA MEAS LEFT PROX ICA EDV: 13.4 CM/SEC
BH CV XLRA MEAS LEFT PROX ICA PSV: 55.8 CM/SEC
BH CV XLRA MEAS LEFT PROX SCLA PSV: 124 CM/SEC
BH CV XLRA MEAS LEFT VERTEBRAL A EDV: 17.3 CM/SEC
BH CV XLRA MEAS LEFT VERTEBRAL A PSV: 44.8 CM/SEC
BH CV XLRA MEAS RIGHT CCA RATIO VEL: 106 CM/SEC
BH CV XLRA MEAS RIGHT DIST CCA EDV: 24.4 CM/SEC
BH CV XLRA MEAS RIGHT DIST CCA PSV: 129 CM/SEC
BH CV XLRA MEAS RIGHT DIST ICA EDV: 23.6 CM/SEC
BH CV XLRA MEAS RIGHT DIST ICA PSV: 80.1 CM/SEC
BH CV XLRA MEAS RIGHT ICA RATIO VEL: 103 CM/SEC
BH CV XLRA MEAS RIGHT ICA/CCA RATIO: 0.97
BH CV XLRA MEAS RIGHT MID CCA EDV: 18.1 CM/SEC
BH CV XLRA MEAS RIGHT MID CCA PSV: 106 CM/SEC
BH CV XLRA MEAS RIGHT MID ICA EDV: 14.1 CM/SEC
BH CV XLRA MEAS RIGHT MID ICA PSV: 103 CM/SEC
BH CV XLRA MEAS RIGHT PROX CCA EDV: 16.5 CM/SEC
BH CV XLRA MEAS RIGHT PROX CCA PSV: 103 CM/SEC
BH CV XLRA MEAS RIGHT PROX ECA EDV: 8.6 CM/SEC
BH CV XLRA MEAS RIGHT PROX ECA PSV: 107 CM/SEC
BH CV XLRA MEAS RIGHT PROX ICA EDV: 14.1 CM/SEC
BH CV XLRA MEAS RIGHT PROX ICA PSV: 58.9 CM/SEC
BH CV XLRA MEAS RIGHT PROX SCLA PSV: 142 CM/SEC
BH CV XLRA MEAS RIGHT VERTEBRAL A EDV: 13.4 CM/SEC
BH CV XLRA MEAS RIGHT VERTEBRAL A PSV: 52.6 CM/SEC
CHOLEST SERPL-MCNC: 122 MG/DL (ref 0–200)
GLUCOSE BLDC GLUCOMTR-MCNC: 224 MG/DL (ref 70–130)
GLUCOSE BLDC GLUCOMTR-MCNC: 298 MG/DL (ref 70–130)
GLUCOSE BLDC GLUCOMTR-MCNC: 299 MG/DL (ref 70–130)
GLUCOSE BLDC GLUCOMTR-MCNC: 305 MG/DL (ref 70–130)
HDLC SERPL-MCNC: 29 MG/DL (ref 40–60)
PA ADP PRP-ACNC: 283 PRU
TRIGL SERPL-MCNC: 165 MG/DL (ref 0–150)
TROPONIN I SERPL-MCNC: <0.006 NG/ML

## 2017-08-12 PROCEDURE — 25010000002 HEPARIN (PORCINE) PER 1000 UNITS

## 2017-08-12 PROCEDURE — 94010 BREATHING CAPACITY TEST: CPT

## 2017-08-12 PROCEDURE — 80061 LIPID PANEL: CPT | Performed by: PHYSICIAN ASSISTANT

## 2017-08-12 PROCEDURE — 25010000002 SULFUR HEXAFLUORIDE MICROSPH 60.7-25 MG RECONSTITUTED SUSPENSION: Performed by: THORACIC SURGERY (CARDIOTHORACIC VASCULAR SURGERY)

## 2017-08-12 PROCEDURE — 63710000001 INSULIN DETEMIR PER 5 UNITS: Performed by: THORACIC SURGERY (CARDIOTHORACIC VASCULAR SURGERY)

## 2017-08-12 PROCEDURE — 84484 ASSAY OF TROPONIN QUANT: CPT | Performed by: PHYSICIAN ASSISTANT

## 2017-08-12 PROCEDURE — 85576 BLOOD PLATELET AGGREGATION: CPT | Performed by: PHYSICIAN ASSISTANT

## 2017-08-12 PROCEDURE — 82962 GLUCOSE BLOOD TEST: CPT

## 2017-08-12 PROCEDURE — 93880 EXTRACRANIAL BILAT STUDY: CPT

## 2017-08-12 PROCEDURE — 93306 TTE W/DOPPLER COMPLETE: CPT | Performed by: INTERNAL MEDICINE

## 2017-08-12 PROCEDURE — 93880 EXTRACRANIAL BILAT STUDY: CPT | Performed by: INTERNAL MEDICINE

## 2017-08-12 PROCEDURE — 93306 TTE W/DOPPLER COMPLETE: CPT

## 2017-08-12 PROCEDURE — 85730 THROMBOPLASTIN TIME PARTIAL: CPT | Performed by: THORACIC SURGERY (CARDIOTHORACIC VASCULAR SURGERY)

## 2017-08-12 PROCEDURE — 94010 BREATHING CAPACITY TEST: CPT | Performed by: INTERNAL MEDICINE

## 2017-08-12 PROCEDURE — 94729 DIFFUSING CAPACITY: CPT

## 2017-08-12 PROCEDURE — 94729 DIFFUSING CAPACITY: CPT | Performed by: INTERNAL MEDICINE

## 2017-08-12 PROCEDURE — C8929 TTE W OR WO FOL WCON,DOPPLER: HCPCS

## 2017-08-12 PROCEDURE — 85730 THROMBOPLASTIN TIME PARTIAL: CPT

## 2017-08-12 RX ADMIN — HEPARIN SODIUM AND DEXTROSE 15 UNITS/KG/HR: 10000; 5 INJECTION INTRAVENOUS at 06:10

## 2017-08-12 RX ADMIN — RANOLAZINE 500 MG: 500 TABLET, FILM COATED, EXTENDED RELEASE ORAL at 17:26

## 2017-08-12 RX ADMIN — GABAPENTIN 300 MG: 300 CAPSULE ORAL at 21:02

## 2017-08-12 RX ADMIN — INSULIN LISPRO 4 UNITS: 100 INJECTION, SOLUTION INTRAVENOUS; SUBCUTANEOUS at 21:02

## 2017-08-12 RX ADMIN — FUROSEMIDE 40 MG: 40 TABLET ORAL at 08:19

## 2017-08-12 RX ADMIN — SULFUR HEXAFLUORIDE 2 ML: KIT at 09:00

## 2017-08-12 RX ADMIN — MONTELUKAST SODIUM 5 MG: 10 TABLET, FILM COATED ORAL at 21:02

## 2017-08-12 RX ADMIN — GABAPENTIN 300 MG: 300 CAPSULE ORAL at 08:23

## 2017-08-12 RX ADMIN — TOPIRAMATE 100 MG: 100 TABLET, FILM COATED ORAL at 08:17

## 2017-08-12 RX ADMIN — INSULIN DETEMIR 10 UNITS: 100 INJECTION, SOLUTION SUBCUTANEOUS at 21:04

## 2017-08-12 RX ADMIN — RANOLAZINE 500 MG: 500 TABLET, FILM COATED, EXTENDED RELEASE ORAL at 08:28

## 2017-08-12 RX ADMIN — ASPIRIN 81 MG 81 MG: 81 TABLET ORAL at 08:19

## 2017-08-12 RX ADMIN — INSULIN LISPRO 4 UNITS: 100 INJECTION, SOLUTION INTRAVENOUS; SUBCUTANEOUS at 17:16

## 2017-08-12 RX ADMIN — HEPARIN SODIUM AND DEXTROSE 15 UNITS/KG/HR: 10000; 5 INJECTION INTRAVENOUS at 17:16

## 2017-08-12 RX ADMIN — INSULIN LISPRO 3 UNITS: 100 INJECTION, SOLUTION INTRAVENOUS; SUBCUTANEOUS at 08:19

## 2017-08-12 RX ADMIN — AMLODIPINE BESYLATE 5 MG: 5 TABLET ORAL at 08:17

## 2017-08-12 RX ADMIN — LISINOPRIL 40 MG: 20 TABLET ORAL at 08:17

## 2017-08-12 RX ADMIN — PANTOPRAZOLE SODIUM 40 MG: 40 TABLET, DELAYED RELEASE ORAL at 06:10

## 2017-08-12 RX ADMIN — INSULIN LISPRO 5 UNITS: 100 INJECTION, SOLUTION INTRAVENOUS; SUBCUTANEOUS at 11:48

## 2017-08-12 RX ADMIN — ATORVASTATIN CALCIUM 10 MG: 10 TABLET, FILM COATED ORAL at 08:18

## 2017-08-12 RX ADMIN — ATENOLOL 25 MG: 25 TABLET ORAL at 08:28

## 2017-08-12 RX ADMIN — CETIRIZINE HYDROCHLORIDE 10 MG: 10 TABLET, FILM COATED ORAL at 08:18

## 2017-08-12 RX ADMIN — NITROGLYCERIN 25 MCG/MIN: 20 INJECTION INTRAVENOUS at 21:03

## 2017-08-12 RX ADMIN — POTASSIUM CHLORIDE 20 MEQ: 1.5 POWDER, FOR SOLUTION ORAL at 08:28

## 2017-08-12 RX ADMIN — TRAZODONE HYDROCHLORIDE 25 MG: 50 TABLET ORAL at 21:02

## 2017-08-13 ENCOUNTER — APPOINTMENT (OUTPATIENT)
Dept: GENERAL RADIOLOGY | Facility: HOSPITAL | Age: 57
End: 2017-08-13

## 2017-08-13 LAB
APTT PPP: 54.7 SECONDS (ref 45–60)
GLUCOSE BLDC GLUCOMTR-MCNC: 218 MG/DL (ref 70–130)
GLUCOSE BLDC GLUCOMTR-MCNC: 245 MG/DL (ref 70–130)
GLUCOSE BLDC GLUCOMTR-MCNC: 292 MG/DL (ref 70–130)
GLUCOSE BLDC GLUCOMTR-MCNC: 293 MG/DL (ref 70–130)
PA ADP PRP-ACNC: 260 PRU
TROPONIN I SERPL-MCNC: <0.006 NG/ML

## 2017-08-13 PROCEDURE — 71020 HC CHEST PA AND LATERAL: CPT

## 2017-08-13 PROCEDURE — 84484 ASSAY OF TROPONIN QUANT: CPT | Performed by: PHYSICIAN ASSISTANT

## 2017-08-13 PROCEDURE — 25010000002 HEPARIN (PORCINE) PER 1000 UNITS

## 2017-08-13 PROCEDURE — 85730 THROMBOPLASTIN TIME PARTIAL: CPT | Performed by: THORACIC SURGERY (CARDIOTHORACIC VASCULAR SURGERY)

## 2017-08-13 PROCEDURE — 86901 BLOOD TYPING SEROLOGIC RH(D): CPT

## 2017-08-13 PROCEDURE — 86900 BLOOD TYPING SEROLOGIC ABO: CPT

## 2017-08-13 PROCEDURE — 99222 1ST HOSP IP/OBS MODERATE 55: CPT | Performed by: NURSE PRACTITIONER

## 2017-08-13 PROCEDURE — 85576 BLOOD PLATELET AGGREGATION: CPT | Performed by: PHYSICIAN ASSISTANT

## 2017-08-13 PROCEDURE — 63710000001 INSULIN DETEMIR PER 5 UNITS: Performed by: NURSE PRACTITIONER

## 2017-08-13 PROCEDURE — 82962 GLUCOSE BLOOD TEST: CPT

## 2017-08-13 PROCEDURE — 63710000001 INSULIN LISPRO (HUMAN) PER 5 UNITS: Performed by: NURSE PRACTITIONER

## 2017-08-13 RX ADMIN — POTASSIUM CHLORIDE 20 MEQ: 1.5 POWDER, FOR SOLUTION ORAL at 07:57

## 2017-08-13 RX ADMIN — ATENOLOL 25 MG: 25 TABLET ORAL at 07:58

## 2017-08-13 RX ADMIN — INSULIN LISPRO 4 UNITS: 100 INJECTION, SOLUTION INTRAVENOUS; SUBCUTANEOUS at 12:28

## 2017-08-13 RX ADMIN — LISINOPRIL 40 MG: 20 TABLET ORAL at 07:58

## 2017-08-13 RX ADMIN — RANOLAZINE 500 MG: 500 TABLET, FILM COATED, EXTENDED RELEASE ORAL at 07:57

## 2017-08-13 RX ADMIN — RANOLAZINE 500 MG: 500 TABLET, FILM COATED, EXTENDED RELEASE ORAL at 17:09

## 2017-08-13 RX ADMIN — GABAPENTIN 300 MG: 300 CAPSULE ORAL at 07:58

## 2017-08-13 RX ADMIN — HEPARIN SODIUM AND DEXTROSE 15 UNITS/KG/HR: 10000; 5 INJECTION INTRAVENOUS at 05:57

## 2017-08-13 RX ADMIN — INSULIN LISPRO 5 UNITS: 100 INJECTION, SOLUTION INTRAVENOUS; SUBCUTANEOUS at 17:05

## 2017-08-13 RX ADMIN — TRAZODONE HYDROCHLORIDE 25 MG: 50 TABLET ORAL at 20:58

## 2017-08-13 RX ADMIN — HEPARIN SODIUM AND DEXTROSE 15 UNITS/KG/HR: 10000; 5 INJECTION INTRAVENOUS at 20:58

## 2017-08-13 RX ADMIN — FUROSEMIDE 40 MG: 40 TABLET ORAL at 07:57

## 2017-08-13 RX ADMIN — INSULIN LISPRO 4 UNITS: 100 INJECTION, SOLUTION INTRAVENOUS; SUBCUTANEOUS at 20:57

## 2017-08-13 RX ADMIN — AMLODIPINE BESYLATE 5 MG: 5 TABLET ORAL at 07:58

## 2017-08-13 RX ADMIN — GABAPENTIN 300 MG: 300 CAPSULE ORAL at 20:58

## 2017-08-13 RX ADMIN — INSULIN LISPRO 3 UNITS: 100 INJECTION, SOLUTION INTRAVENOUS; SUBCUTANEOUS at 07:54

## 2017-08-13 RX ADMIN — PANTOPRAZOLE SODIUM 40 MG: 40 TABLET, DELAYED RELEASE ORAL at 07:57

## 2017-08-13 RX ADMIN — INSULIN LISPRO 6 UNITS: 100 INJECTION, SOLUTION INTRAVENOUS; SUBCUTANEOUS at 17:06

## 2017-08-13 RX ADMIN — INSULIN DETEMIR 18 UNITS: 100 INJECTION, SOLUTION SUBCUTANEOUS at 20:58

## 2017-08-13 RX ADMIN — ASPIRIN 81 MG 81 MG: 81 TABLET ORAL at 07:58

## 2017-08-13 RX ADMIN — MONTELUKAST SODIUM 5 MG: 10 TABLET, FILM COATED ORAL at 20:57

## 2017-08-13 RX ADMIN — TOPIRAMATE 100 MG: 100 TABLET, FILM COATED ORAL at 08:04

## 2017-08-13 RX ADMIN — CETIRIZINE HYDROCHLORIDE 10 MG: 10 TABLET, FILM COATED ORAL at 07:58

## 2017-08-13 RX ADMIN — ATORVASTATIN CALCIUM 10 MG: 10 TABLET, FILM COATED ORAL at 07:58

## 2017-08-14 ENCOUNTER — APPOINTMENT (OUTPATIENT)
Dept: GENERAL RADIOLOGY | Facility: HOSPITAL | Age: 57
End: 2017-08-14

## 2017-08-14 ENCOUNTER — ANESTHESIA EVENT (OUTPATIENT)
Dept: PERIOP | Facility: HOSPITAL | Age: 57
End: 2017-08-14

## 2017-08-14 LAB
ABO GROUP BLD: NORMAL
ABO GROUP BLD: NORMAL
APTT PPP: 51.8 SECONDS (ref 45–60)
BLD GP AB SCN SERPL QL: NEGATIVE
GLUCOSE BLDC GLUCOMTR-MCNC: 247 MG/DL (ref 70–130)
GLUCOSE BLDC GLUCOMTR-MCNC: 272 MG/DL (ref 70–130)
GLUCOSE BLDC GLUCOMTR-MCNC: 278 MG/DL (ref 70–130)
GLUCOSE BLDC GLUCOMTR-MCNC: 327 MG/DL (ref 70–130)
PA ADP PRP-ACNC: 222 PRU
RH BLD: POSITIVE
RH BLD: POSITIVE

## 2017-08-14 PROCEDURE — 25010000002 HEPARIN (PORCINE) PER 1000 UNITS: Performed by: PHYSICIAN ASSISTANT

## 2017-08-14 PROCEDURE — 82962 GLUCOSE BLOOD TEST: CPT

## 2017-08-14 PROCEDURE — 85730 THROMBOPLASTIN TIME PARTIAL: CPT

## 2017-08-14 PROCEDURE — 86901 BLOOD TYPING SEROLOGIC RH(D): CPT | Performed by: PHYSICIAN ASSISTANT

## 2017-08-14 PROCEDURE — 71010 HC CHEST PA OR AP: CPT

## 2017-08-14 PROCEDURE — 85576 BLOOD PLATELET AGGREGATION: CPT | Performed by: PHYSICIAN ASSISTANT

## 2017-08-14 PROCEDURE — 63710000001 INSULIN DETEMIR PER 5 UNITS: Performed by: PHYSICIAN ASSISTANT

## 2017-08-14 PROCEDURE — 86920 COMPATIBILITY TEST SPIN: CPT

## 2017-08-14 PROCEDURE — 99232 SBSQ HOSP IP/OBS MODERATE 35: CPT | Performed by: PHYSICIAN ASSISTANT

## 2017-08-14 PROCEDURE — 86900 BLOOD TYPING SEROLOGIC ABO: CPT | Performed by: PHYSICIAN ASSISTANT

## 2017-08-14 PROCEDURE — 86850 RBC ANTIBODY SCREEN: CPT | Performed by: PHYSICIAN ASSISTANT

## 2017-08-14 PROCEDURE — 63710000001 INSULIN LISPRO (HUMAN) PER 5 UNITS: Performed by: PHYSICIAN ASSISTANT

## 2017-08-14 RX ORDER — ATENOLOL 25 MG/1
25 TABLET ORAL NIGHTLY
Status: DISCONTINUED | OUTPATIENT
Start: 2017-08-15 | End: 2017-08-14

## 2017-08-14 RX ORDER — FLUNISOLIDE 0.25 MG/ML
1 SOLUTION NASAL EVERY 12 HOURS PRN
Status: ON HOLD | COMMUNITY
End: 2017-12-19

## 2017-08-14 RX ORDER — ATENOLOL 25 MG/1
25 TABLET ORAL
Status: DISCONTINUED | OUTPATIENT
Start: 2017-08-15 | End: 2017-08-15

## 2017-08-14 RX ORDER — CHLORHEXIDINE GLUCONATE 500 MG/1
1 CLOTH TOPICAL EVERY 12 HOURS PRN
Status: DISCONTINUED | OUTPATIENT
Start: 2017-08-14 | End: 2017-08-16

## 2017-08-14 RX ORDER — ATORVASTATIN CALCIUM 10 MG/1
10 TABLET, FILM COATED ORAL NIGHTLY
Status: DISCONTINUED | OUTPATIENT
Start: 2017-08-15 | End: 2017-08-15

## 2017-08-14 RX ORDER — CHLORHEXIDINE GLUCONATE 0.12 MG/ML
15 RINSE ORAL EVERY 12 HOURS
Status: COMPLETED | OUTPATIENT
Start: 2017-08-14 | End: 2017-08-14

## 2017-08-14 RX ORDER — SODIUM CHLORIDE 0.9 % (FLUSH) 0.9 %
1-10 SYRINGE (ML) INJECTION AS NEEDED
Status: DISCONTINUED | OUTPATIENT
Start: 2017-08-14 | End: 2017-08-17

## 2017-08-14 RX ORDER — POTASSIUM CHLORIDE 750 MG/1
20 CAPSULE, EXTENDED RELEASE ORAL DAILY
Status: DISCONTINUED | OUTPATIENT
Start: 2017-08-15 | End: 2017-08-16

## 2017-08-14 RX ADMIN — INSULIN LISPRO 6 UNITS: 100 INJECTION, SOLUTION INTRAVENOUS; SUBCUTANEOUS at 16:51

## 2017-08-14 RX ADMIN — RANOLAZINE 500 MG: 500 TABLET, FILM COATED, EXTENDED RELEASE ORAL at 16:51

## 2017-08-14 RX ADMIN — ATENOLOL 25 MG: 25 TABLET ORAL at 08:20

## 2017-08-14 RX ADMIN — ASPIRIN 81 MG 81 MG: 81 TABLET ORAL at 08:19

## 2017-08-14 RX ADMIN — HEPARIN SODIUM AND DEXTROSE 15 UNITS/KG/HR: 10000; 5 INJECTION INTRAVENOUS at 09:32

## 2017-08-14 RX ADMIN — NITROGLYCERIN 25 MCG/MIN: 20 INJECTION INTRAVENOUS at 01:38

## 2017-08-14 RX ADMIN — INSULIN LISPRO 7 UNITS: 100 INJECTION, SOLUTION INTRAVENOUS; SUBCUTANEOUS at 21:54

## 2017-08-14 RX ADMIN — TOPIRAMATE 100 MG: 100 TABLET, FILM COATED ORAL at 08:20

## 2017-08-14 RX ADMIN — INSULIN LISPRO 6 UNITS: 100 INJECTION, SOLUTION INTRAVENOUS; SUBCUTANEOUS at 12:06

## 2017-08-14 RX ADMIN — POTASSIUM CHLORIDE 20 MEQ: 1.5 POWDER, FOR SOLUTION ORAL at 08:19

## 2017-08-14 RX ADMIN — INSULIN LISPRO 5 UNITS: 100 INJECTION, SOLUTION INTRAVENOUS; SUBCUTANEOUS at 08:20

## 2017-08-14 RX ADMIN — GABAPENTIN 300 MG: 300 CAPSULE ORAL at 21:53

## 2017-08-14 RX ADMIN — GABAPENTIN 300 MG: 300 CAPSULE ORAL at 08:20

## 2017-08-14 RX ADMIN — AMLODIPINE BESYLATE 5 MG: 5 TABLET ORAL at 08:20

## 2017-08-14 RX ADMIN — INSULIN LISPRO 10 UNITS: 100 INJECTION, SOLUTION INTRAVENOUS; SUBCUTANEOUS at 12:06

## 2017-08-14 RX ADMIN — INSULIN LISPRO 4 UNITS: 100 INJECTION, SOLUTION INTRAVENOUS; SUBCUTANEOUS at 08:21

## 2017-08-14 RX ADMIN — LISINOPRIL 40 MG: 20 TABLET ORAL at 08:19

## 2017-08-14 RX ADMIN — FUROSEMIDE 40 MG: 40 TABLET ORAL at 08:19

## 2017-08-14 RX ADMIN — INSULIN DETEMIR 30 UNITS: 100 INJECTION, SOLUTION SUBCUTANEOUS at 21:54

## 2017-08-14 RX ADMIN — PANTOPRAZOLE SODIUM 40 MG: 40 TABLET, DELAYED RELEASE ORAL at 08:19

## 2017-08-14 RX ADMIN — ATORVASTATIN CALCIUM 10 MG: 10 TABLET, FILM COATED ORAL at 08:19

## 2017-08-14 RX ADMIN — CHLORHEXIDINE GLUCONATE 15 ML: 1.2 RINSE ORAL at 21:53

## 2017-08-14 RX ADMIN — MONTELUKAST SODIUM 5 MG: 10 TABLET, FILM COATED ORAL at 21:53

## 2017-08-14 RX ADMIN — RANOLAZINE 500 MG: 500 TABLET, FILM COATED, EXTENDED RELEASE ORAL at 08:20

## 2017-08-14 RX ADMIN — TRAZODONE HYDROCHLORIDE 25 MG: 50 TABLET ORAL at 23:25

## 2017-08-14 RX ADMIN — CHLORHEXIDINE GLUCONATE 15 ML: 1.2 RINSE ORAL at 09:00

## 2017-08-14 RX ADMIN — INSULIN LISPRO 10 UNITS: 100 INJECTION, SOLUTION INTRAVENOUS; SUBCUTANEOUS at 16:51

## 2017-08-14 RX ADMIN — CETIRIZINE HYDROCHLORIDE 10 MG: 10 TABLET, FILM COATED ORAL at 08:19

## 2017-08-15 ENCOUNTER — ANESTHESIA (OUTPATIENT)
Dept: PERIOP | Facility: HOSPITAL | Age: 57
End: 2017-08-15

## 2017-08-15 ENCOUNTER — APPOINTMENT (OUTPATIENT)
Dept: PERIOP | Facility: HOSPITAL | Age: 57
End: 2017-08-15

## 2017-08-15 ENCOUNTER — APPOINTMENT (OUTPATIENT)
Dept: GENERAL RADIOLOGY | Facility: HOSPITAL | Age: 57
End: 2017-08-15

## 2017-08-15 PROBLEM — I63.9 STROKE (HCC): Status: ACTIVE | Noted: 2017-08-15

## 2017-08-15 PROBLEM — R94.39 ABNORMAL STRESS TEST: Status: RESOLVED | Noted: 2017-07-18 | Resolved: 2017-08-15

## 2017-08-15 PROBLEM — R07.9 CHEST PAIN: Status: RESOLVED | Noted: 2017-01-19 | Resolved: 2017-08-15

## 2017-08-15 PROBLEM — R06.02 SHORTNESS OF BREATH: Status: RESOLVED | Noted: 2017-01-19 | Resolved: 2017-08-15

## 2017-08-15 PROBLEM — E66.09 NON MORBID OBESITY DUE TO EXCESS CALORIES: Status: ACTIVE | Noted: 2017-08-15

## 2017-08-15 PROBLEM — R53.1 WEAKNESS GENERALIZED: Status: RESOLVED | Noted: 2017-04-11 | Resolved: 2017-08-15

## 2017-08-15 PROBLEM — I20.89 ANGINAL EQUIVALENT: Status: RESOLVED | Noted: 2017-07-18 | Resolved: 2017-08-15

## 2017-08-15 PROBLEM — I20.8 ANGINAL EQUIVALENT (HCC): Status: RESOLVED | Noted: 2017-07-18 | Resolved: 2017-08-15

## 2017-08-15 PROBLEM — R60.9 EDEMA: Status: RESOLVED | Noted: 2017-01-19 | Resolved: 2017-08-15

## 2017-08-15 PROBLEM — Z86.73 HISTORY OF CVA (CEREBROVASCULAR ACCIDENT): Status: ACTIVE | Noted: 2017-08-15

## 2017-08-15 PROBLEM — R06.09 DOE (DYSPNEA ON EXERTION): Status: RESOLVED | Noted: 2017-07-18 | Resolved: 2017-08-15

## 2017-08-15 LAB
ACT BLD: 120 SECONDS (ref 82–152)
ACT BLD: 120 SECONDS (ref 82–152)
ACT BLD: 494 SECONDS (ref 82–152)
ACT BLD: 494 SECONDS (ref 82–152)
ACT BLD: 516 SECONDS (ref 82–152)
ACT BLD: 538 SECONDS (ref 82–152)
ACT BLD: 560 SECONDS (ref 82–152)
ALBUMIN SERPL-MCNC: 3.7 G/DL (ref 3.2–4.8)
ALBUMIN SERPL-MCNC: 4.1 G/DL (ref 3.2–4.8)
ANION GAP SERPL CALCULATED.3IONS-SCNC: 5 MMOL/L (ref 3–11)
ANION GAP SERPL CALCULATED.3IONS-SCNC: 6 MMOL/L (ref 3–11)
ANION GAP SERPL CALCULATED.3IONS-SCNC: 7 MMOL/L (ref 3–11)
APTT PPP: 28.7 SECONDS (ref 24–31)
ARTERIAL PATENCY WRIST A: ABNORMAL
ATMOSPHERIC PRESS: ABNORMAL MMHG
BACTERIA UR QL AUTO: ABNORMAL /HPF
BASE EXCESS BLDA CALC-SCNC: -1 MMOL/L (ref -5–5)
BASE EXCESS BLDA CALC-SCNC: -1 MMOL/L (ref -5–5)
BASE EXCESS BLDA CALC-SCNC: -2 MMOL/L (ref -5–5)
BASE EXCESS BLDA CALC-SCNC: -3 MMOL/L (ref -5–5)
BASE EXCESS BLDA CALC-SCNC: -3.2 MMOL/L (ref 0–2)
BASE EXCESS BLDA CALC-SCNC: -3.8 MMOL/L (ref 0–2)
BASE EXCESS BLDA CALC-SCNC: -3.9 MMOL/L (ref 0–2)
BASE EXCESS BLDA CALC-SCNC: -4 MMOL/L (ref -5–5)
BASE EXCESS BLDA CALC-SCNC: -4 MMOL/L (ref -5–5)
BASE EXCESS BLDA CALC-SCNC: 0 MMOL/L (ref -5–5)
BDY SITE: ABNORMAL
BILIRUB UR QL STRIP: NEGATIVE
BUN BLD-MCNC: 12 MG/DL (ref 9–23)
BUN BLD-MCNC: 12 MG/DL (ref 9–23)
BUN BLD-MCNC: 13 MG/DL (ref 9–23)
BUN/CREAT SERPL: 15 (ref 7–25)
BUN/CREAT SERPL: 17.1 (ref 7–25)
BUN/CREAT SERPL: 18.6 (ref 7–25)
CA-I BLDA-SCNC: 1.1 MMOL/L (ref 1.2–1.32)
CA-I BLDA-SCNC: 1.16 MMOL/L (ref 1.2–1.32)
CA-I BLDA-SCNC: 1.17 MMOL/L (ref 1.2–1.32)
CA-I BLDA-SCNC: 1.18 MMOL/L (ref 1.2–1.32)
CA-I BLDA-SCNC: 1.18 MMOL/L (ref 1.2–1.32)
CA-I BLDA-SCNC: 1.22 MMOL/L (ref 1.2–1.32)
CA-I BLDA-SCNC: 1.3 MMOL/L (ref 1.2–1.32)
CA-I SERPL ISE-MCNC: 1.15 MMOL/L (ref 1.12–1.32)
CALCIUM SPEC-SCNC: 8.3 MG/DL (ref 8.7–10.4)
CALCIUM SPEC-SCNC: 8.4 MG/DL (ref 8.7–10.4)
CALCIUM SPEC-SCNC: 9.3 MG/DL (ref 8.7–10.4)
CHLORIDE SERPL-SCNC: 101 MMOL/L (ref 99–109)
CHLORIDE SERPL-SCNC: 107 MMOL/L (ref 99–109)
CHLORIDE SERPL-SCNC: 107 MMOL/L (ref 99–109)
CLARITY UR: ABNORMAL
CO2 BLDA-SCNC: 22.9 MMOL/L (ref 22–33)
CO2 BLDA-SCNC: 23 MMOL/L (ref 24–29)
CO2 BLDA-SCNC: 23.9 MMOL/L (ref 22–33)
CO2 BLDA-SCNC: 24 MMOL/L (ref 24–29)
CO2 BLDA-SCNC: 24 MMOL/L (ref 24–29)
CO2 BLDA-SCNC: 25 MMOL/L (ref 24–29)
CO2 BLDA-SCNC: 25.2 MMOL/L (ref 22–33)
CO2 BLDA-SCNC: 26 MMOL/L (ref 24–29)
CO2 BLDA-SCNC: 27 MMOL/L (ref 24–29)
CO2 BLDA-SCNC: 27 MMOL/L (ref 24–29)
CO2 SERPL-SCNC: 22 MMOL/L (ref 20–31)
CO2 SERPL-SCNC: 24 MMOL/L (ref 20–31)
CO2 SERPL-SCNC: 26 MMOL/L (ref 20–31)
COHGB MFR BLD: 0 % (ref 0–2)
COHGB MFR BLD: 0.1 % (ref 0–2)
COHGB MFR BLD: 0.2 % (ref 0–2)
COLOR UR: YELLOW
CREAT BLD-MCNC: 0.7 MG/DL (ref 0.6–1.3)
CREAT BLD-MCNC: 0.7 MG/DL (ref 0.6–1.3)
CREAT BLD-MCNC: 0.8 MG/DL (ref 0.6–1.3)
DEPRECATED RDW RBC AUTO: 37.9 FL (ref 37–54)
DEPRECATED RDW RBC AUTO: 38.4 FL (ref 37–54)
DEPRECATED RDW RBC AUTO: 38.7 FL (ref 37–54)
ERYTHROCYTE [DISTWIDTH] IN BLOOD BY AUTOMATED COUNT: 12.7 % (ref 11.3–14.5)
ERYTHROCYTE [DISTWIDTH] IN BLOOD BY AUTOMATED COUNT: 12.8 % (ref 11.3–14.5)
ERYTHROCYTE [DISTWIDTH] IN BLOOD BY AUTOMATED COUNT: 12.8 % (ref 11.3–14.5)
GFR SERPL CREATININE-BSD FRML MDRD: 100 ML/MIN/1.73
GFR SERPL CREATININE-BSD FRML MDRD: 117 ML/MIN/1.73
GFR SERPL CREATININE-BSD FRML MDRD: 117 ML/MIN/1.73
GLUCOSE BLD-MCNC: 149 MG/DL (ref 70–100)
GLUCOSE BLD-MCNC: 186 MG/DL (ref 70–100)
GLUCOSE BLD-MCNC: 301 MG/DL (ref 70–100)
GLUCOSE BLDC GLUCOMTR-MCNC: 137 MG/DL (ref 70–130)
GLUCOSE BLDC GLUCOMTR-MCNC: 144 MG/DL (ref 70–130)
GLUCOSE BLDC GLUCOMTR-MCNC: 162 MG/DL (ref 70–130)
GLUCOSE BLDC GLUCOMTR-MCNC: 163 MG/DL (ref 70–130)
GLUCOSE BLDC GLUCOMTR-MCNC: 168 MG/DL (ref 70–130)
GLUCOSE BLDC GLUCOMTR-MCNC: 182 MG/DL (ref 70–130)
GLUCOSE BLDC GLUCOMTR-MCNC: 186 MG/DL (ref 70–130)
GLUCOSE BLDC GLUCOMTR-MCNC: 188 MG/DL (ref 70–130)
GLUCOSE BLDC GLUCOMTR-MCNC: 190 MG/DL (ref 70–130)
GLUCOSE BLDC GLUCOMTR-MCNC: 313 MG/DL (ref 70–130)
GLUCOSE UR STRIP-MCNC: NEGATIVE MG/DL
HCO3 BLDA-SCNC: 21.6 MMOL/L (ref 20–26)
HCO3 BLDA-SCNC: 21.7 MMOL/L (ref 22–26)
HCO3 BLDA-SCNC: 22.5 MMOL/L (ref 20–26)
HCO3 BLDA-SCNC: 22.5 MMOL/L (ref 22–26)
HCO3 BLDA-SCNC: 22.6 MMOL/L (ref 22–26)
HCO3 BLDA-SCNC: 23.2 MMOL/L (ref 22–26)
HCO3 BLDA-SCNC: 23.7 MMOL/L (ref 20–26)
HCO3 BLDA-SCNC: 24.3 MMOL/L (ref 22–26)
HCO3 BLDA-SCNC: 25.4 MMOL/L (ref 22–26)
HCO3 BLDA-SCNC: 25.5 MMOL/L (ref 22–26)
HCT VFR BLD AUTO: 31.8 % (ref 38.9–50.9)
HCT VFR BLD AUTO: 32.2 % (ref 38.9–50.9)
HCT VFR BLD AUTO: 35.9 % (ref 38.9–50.9)
HCT VFR BLD AUTO: 37.8 % (ref 38.9–50.9)
HCT VFR BLD CALC: 34.1 %
HCT VFR BLD CALC: 36.1 %
HCT VFR BLD CALC: 39.1 %
HCT VFR BLDA CALC: 31 % (ref 38–51)
HCT VFR BLDA CALC: 32 % (ref 38–51)
HCT VFR BLDA CALC: 33 % (ref 38–51)
HCT VFR BLDA CALC: 34 % (ref 38–51)
HCT VFR BLDA CALC: 35 % (ref 38–51)
HCT VFR BLDA CALC: 36 % (ref 38–51)
HCT VFR BLDA CALC: 37 % (ref 38–51)
HGB BLD-MCNC: 10.6 G/DL (ref 13.1–17.5)
HGB BLD-MCNC: 10.8 G/DL (ref 13.1–17.5)
HGB BLD-MCNC: 12.1 G/DL (ref 13.1–17.5)
HGB BLD-MCNC: 12.5 G/DL (ref 13.1–17.5)
HGB BLDA-MCNC: 10.5 G/DL (ref 12–17)
HGB BLDA-MCNC: 10.9 G/DL (ref 12–17)
HGB BLDA-MCNC: 11.1 G/DL (ref 13.5–17.5)
HGB BLDA-MCNC: 11.2 G/DL (ref 12–17)
HGB BLDA-MCNC: 11.6 G/DL (ref 12–17)
HGB BLDA-MCNC: 11.8 G/DL (ref 13.5–17.5)
HGB BLDA-MCNC: 11.9 G/DL (ref 12–17)
HGB BLDA-MCNC: 12.2 G/DL (ref 12–17)
HGB BLDA-MCNC: 12.6 G/DL (ref 12–17)
HGB BLDA-MCNC: 12.7 G/DL (ref 13.5–17.5)
HGB UR QL STRIP.AUTO: ABNORMAL
HOROWITZ INDEX BLD+IHG-RTO: 100 %
HOROWITZ INDEX BLD+IHG-RTO: 40 %
HOROWITZ INDEX BLD+IHG-RTO: 40 %
HYALINE CASTS UR QL AUTO: ABNORMAL /LPF
INR PPP: 0.99
KETONES UR QL STRIP: NEGATIVE
LEUKOCYTE ESTERASE UR QL STRIP.AUTO: NEGATIVE
MAGNESIUM SERPL-MCNC: 2.4 MG/DL (ref 1.3–2.7)
MAGNESIUM SERPL-MCNC: 2.8 MG/DL (ref 1.3–2.7)
MCH RBC QN AUTO: 27.2 PG (ref 27–31)
MCH RBC QN AUTO: 27.6 PG (ref 27–31)
MCH RBC QN AUTO: 27.6 PG (ref 27–31)
MCHC RBC AUTO-ENTMCNC: 33.1 G/DL (ref 32–36)
MCHC RBC AUTO-ENTMCNC: 33.5 G/DL (ref 32–36)
MCHC RBC AUTO-ENTMCNC: 33.7 G/DL (ref 32–36)
MCV RBC AUTO: 81.8 FL (ref 80–99)
MCV RBC AUTO: 82.4 FL (ref 80–99)
MCV RBC AUTO: 82.4 FL (ref 80–99)
METHGB BLD QL: 1.1 % (ref 0–1.5)
MODALITY: ABNORMAL
NITRITE UR QL STRIP: NEGATIVE
OXYHGB MFR BLDV: 94.4 % (ref 94–99)
OXYHGB MFR BLDV: 95.2 % (ref 94–99)
OXYHGB MFR BLDV: 96.6 % (ref 94–99)
PA ADP PRP-ACNC: 207 PRU
PCO2 BLDA: 37.1 MM HG (ref 35–45)
PCO2 BLDA: 37.4 MM HG (ref 35–45)
PCO2 BLDA: 40.1 MM HG (ref 35–48)
PCO2 BLDA: 42.5 MM HG (ref 35–45)
PCO2 BLDA: 44.8 MM HG (ref 35–45)
PCO2 BLDA: 45 MM HG (ref 35–48)
PCO2 BLDA: 47.1 MM HG (ref 35–45)
PCO2 BLDA: 48.9 MM HG (ref 35–45)
PCO2 BLDA: 49.1 MM HG (ref 35–48)
PCO2 BLDA: 51.1 MM HG (ref 35–45)
PH BLDA: 7.26 PH UNITS (ref 7.35–7.6)
PH BLDA: 7.29 PH UNITS (ref 7.35–7.45)
PH BLDA: 7.31 PH UNITS (ref 7.35–7.45)
PH BLDA: 7.32 PH UNITS (ref 7.35–7.6)
PH BLDA: 7.33 PH UNITS (ref 7.35–7.6)
PH BLDA: 7.34 PH UNITS (ref 7.35–7.45)
PH BLDA: 7.34 PH UNITS (ref 7.35–7.6)
PH BLDA: 7.34 PH UNITS (ref 7.35–7.6)
PH BLDA: 7.37 PH UNITS (ref 7.35–7.6)
PH BLDA: 7.39 PH UNITS (ref 7.35–7.6)
PH UR STRIP.AUTO: 6 [PH] (ref 5–8)
PHOSPHATE SERPL-MCNC: 2.3 MG/DL (ref 2.4–5.1)
PHOSPHATE SERPL-MCNC: 2.9 MG/DL (ref 2.4–5.1)
PLATELET # BLD AUTO: 105 10*3/MM3 (ref 150–450)
PLATELET # BLD AUTO: 124 10*3/MM3 (ref 150–450)
PLATELET # BLD AUTO: 153 10*3/MM3 (ref 150–450)
PMV BLD AUTO: 10.1 FL (ref 6–12)
PMV BLD AUTO: 10.1 FL (ref 6–12)
PMV BLD AUTO: 10.9 FL (ref 6–12)
PO2 BLDA: 108 MM HG (ref 83–108)
PO2 BLDA: 118 MMHG (ref 80–105)
PO2 BLDA: 207 MMHG (ref 80–105)
PO2 BLDA: 217 MM HG (ref 83–108)
PO2 BLDA: 398 MMHG (ref 80–105)
PO2 BLDA: 431 MMHG (ref 80–105)
PO2 BLDA: 437 MMHG (ref 80–105)
PO2 BLDA: 45 MMHG (ref 80–105)
PO2 BLDA: 73 MMHG (ref 80–105)
PO2 BLDA: 95.2 MM HG (ref 83–108)
POTASSIUM BLD-SCNC: 3.6 MMOL/L (ref 3.5–5.5)
POTASSIUM BLD-SCNC: 4 MMOL/L (ref 3.5–5.5)
POTASSIUM BLD-SCNC: 4 MMOL/L (ref 3.5–5.5)
POTASSIUM BLD-SCNC: 4.3 MMOL/L (ref 3.5–5.5)
POTASSIUM BLDA-SCNC: 3.9 MMOL/L (ref 3.5–4.9)
POTASSIUM BLDA-SCNC: 4 MMOL/L (ref 3.5–4.9)
POTASSIUM BLDA-SCNC: 4.1 MMOL/L (ref 3.5–4.9)
POTASSIUM BLDA-SCNC: 4.3 MMOL/L (ref 3.5–4.9)
POTASSIUM BLDA-SCNC: 4.3 MMOL/L (ref 3.5–4.9)
POTASSIUM BLDA-SCNC: 4.5 MMOL/L (ref 3.5–4.9)
POTASSIUM BLDA-SCNC: 4.6 MMOL/L (ref 3.5–4.9)
PROT UR QL STRIP: ABNORMAL
PROTHROMBIN TIME: 10.8 SECONDS (ref 9.6–11.5)
RBC # BLD AUTO: 3.91 10*6/MM3 (ref 4.2–5.76)
RBC # BLD AUTO: 4.39 10*6/MM3 (ref 4.2–5.76)
RBC # BLD AUTO: 4.59 10*6/MM3 (ref 4.2–5.76)
RBC # UR: ABNORMAL /HPF
REF LAB TEST METHOD: ABNORMAL
SAO2 % BLDA: 100 % (ref 95–98)
SAO2 % BLDA: 74 % (ref 95–98)
SAO2 % BLDA: 95 % (ref 95–98)
SAO2 % BLDA: 99 % (ref 95–98)
SAO2 % BLDCOA: 94.4 %
SODIUM BLD-SCNC: 133 MMOL/L (ref 132–146)
SODIUM BLD-SCNC: 136 MMOL/L (ref 132–146)
SODIUM BLD-SCNC: 136 MMOL/L (ref 132–146)
SODIUM BLDA-SCNC: 137 MMOL/L (ref 138–146)
SODIUM BLDA-SCNC: 137 MMOL/L (ref 138–146)
SODIUM BLDA-SCNC: 138 MMOL/L (ref 138–146)
SODIUM BLDA-SCNC: 139 MMOL/L (ref 138–146)
SODIUM BLDA-SCNC: 139 MMOL/L (ref 138–146)
SP GR UR STRIP: 1.03 (ref 1–1.03)
SQUAMOUS #/AREA URNS HPF: ABNORMAL /HPF
UROBILINOGEN UR QL STRIP: ABNORMAL
WBC NRBC COR # BLD: 10.24 10*3/MM3 (ref 3.5–10.8)
WBC NRBC COR # BLD: 6.01 10*3/MM3 (ref 3.5–10.8)
WBC NRBC COR # BLD: 7.23 10*3/MM3 (ref 3.5–10.8)
WBC UR QL AUTO: ABNORMAL /HPF

## 2017-08-15 PROCEDURE — 25010000002 HEPARIN (PORCINE) PER 1000 UNITS: Performed by: THORACIC SURGERY (CARDIOTHORACIC VASCULAR SURGERY)

## 2017-08-15 PROCEDURE — 85610 PROTHROMBIN TIME: CPT | Performed by: PHYSICIAN ASSISTANT

## 2017-08-15 PROCEDURE — 85014 HEMATOCRIT: CPT

## 2017-08-15 PROCEDURE — 84132 ASSAY OF SERUM POTASSIUM: CPT

## 2017-08-15 PROCEDURE — 06BP4ZZ EXCISION OF RIGHT SAPHENOUS VEIN, PERCUTANEOUS ENDOSCOPIC APPROACH: ICD-10-PCS | Performed by: THORACIC SURGERY (CARDIOTHORACIC VASCULAR SURGERY)

## 2017-08-15 PROCEDURE — 021109W BYPASS CORONARY ARTERY, TWO ARTERIES FROM AORTA WITH AUTOLOGOUS VENOUS TISSUE, OPEN APPROACH: ICD-10-PCS | Performed by: THORACIC SURGERY (CARDIOTHORACIC VASCULAR SURGERY)

## 2017-08-15 PROCEDURE — 25010000002 MAGNESIUM SULFATE PER 500 MG OF MAGNESIUM

## 2017-08-15 PROCEDURE — 94799 UNLISTED PULMONARY SVC/PX: CPT

## 2017-08-15 PROCEDURE — 25010000002 ALBUMIN HUMAN 5% PER 50 ML

## 2017-08-15 PROCEDURE — 25010000002 MIDAZOLAM PER 1 MG: Performed by: ANESTHESIOLOGY

## 2017-08-15 PROCEDURE — 85018 HEMOGLOBIN: CPT | Performed by: THORACIC SURGERY (CARDIOTHORACIC VASCULAR SURGERY)

## 2017-08-15 PROCEDURE — 63710000001 INSULIN REGULAR HUMAN PER 5 UNITS: Performed by: ANESTHESIOLOGY

## 2017-08-15 PROCEDURE — 82962 GLUCOSE BLOOD TEST: CPT

## 2017-08-15 PROCEDURE — 71010 HC CHEST PA OR AP: CPT

## 2017-08-15 PROCEDURE — 81003 URINALYSIS AUTO W/O SCOPE: CPT | Performed by: PHYSICIAN ASSISTANT

## 2017-08-15 PROCEDURE — 82330 ASSAY OF CALCIUM: CPT | Performed by: PHYSICIAN ASSISTANT

## 2017-08-15 PROCEDURE — 25010000002 ALBUMIN HUMAN 5% PER 50 ML: Performed by: ANESTHESIOLOGY

## 2017-08-15 PROCEDURE — P9041 ALBUMIN (HUMAN),5%, 50ML: HCPCS | Performed by: PHYSICIAN ASSISTANT

## 2017-08-15 PROCEDURE — 33518 CABG ARTERY-VEIN TWO: CPT | Performed by: THORACIC SURGERY (CARDIOTHORACIC VASCULAR SURGERY)

## 2017-08-15 PROCEDURE — 33518 CABG ARTERY-VEIN TWO: CPT | Performed by: PHYSICIAN ASSISTANT

## 2017-08-15 PROCEDURE — 80069 RENAL FUNCTION PANEL: CPT | Performed by: PHYSICIAN ASSISTANT

## 2017-08-15 PROCEDURE — 85576 BLOOD PLATELET AGGREGATION: CPT | Performed by: PHYSICIAN ASSISTANT

## 2017-08-15 PROCEDURE — 02100Z9 BYPASS CORONARY ARTERY, ONE ARTERY FROM LEFT INTERNAL MAMMARY, OPEN APPROACH: ICD-10-PCS | Performed by: THORACIC SURGERY (CARDIOTHORACIC VASCULAR SURGERY)

## 2017-08-15 PROCEDURE — 25010000002 ALBUMIN HUMAN 5% PER 50 ML: Performed by: PHYSICIAN ASSISTANT

## 2017-08-15 PROCEDURE — 25010000002 INSULIN REGULAR HUMAN PER 5 UNITS: Performed by: PHYSICIAN ASSISTANT

## 2017-08-15 PROCEDURE — 94002 VENT MGMT INPAT INIT DAY: CPT

## 2017-08-15 PROCEDURE — 94660 CPAP INITIATION&MGMT: CPT

## 2017-08-15 PROCEDURE — 25010000002 PHENYLEPHRINE PER 1 ML

## 2017-08-15 PROCEDURE — 85730 THROMBOPLASTIN TIME PARTIAL: CPT | Performed by: PHYSICIAN ASSISTANT

## 2017-08-15 PROCEDURE — 84132 ASSAY OF SERUM POTASSIUM: CPT | Performed by: THORACIC SURGERY (CARDIOTHORACIC VASCULAR SURGERY)

## 2017-08-15 PROCEDURE — 93318 ECHO TRANSESOPHAGEAL INTRAOP: CPT

## 2017-08-15 PROCEDURE — 84295 ASSAY OF SERUM SODIUM: CPT

## 2017-08-15 PROCEDURE — 25010000002 PROTAMINE SULFATE PER 10 MG: Performed by: ANESTHESIOLOGY

## 2017-08-15 PROCEDURE — 25010000002 MANNITOL PER 50 ML

## 2017-08-15 PROCEDURE — 88311 DECALCIFY TISSUE: CPT | Performed by: THORACIC SURGERY (CARDIOTHORACIC VASCULAR SURGERY)

## 2017-08-15 PROCEDURE — 82805 BLOOD GASES W/O2 SATURATION: CPT | Performed by: THORACIC SURGERY (CARDIOTHORACIC VASCULAR SURGERY)

## 2017-08-15 PROCEDURE — P9041 ALBUMIN (HUMAN),5%, 50ML: HCPCS

## 2017-08-15 PROCEDURE — 85027 COMPLETE CBC AUTOMATED: CPT | Performed by: PHYSICIAN ASSISTANT

## 2017-08-15 PROCEDURE — 33533 CABG ARTERIAL SINGLE: CPT | Performed by: THORACIC SURGERY (CARDIOTHORACIC VASCULAR SURGERY)

## 2017-08-15 PROCEDURE — 99291 CRITICAL CARE FIRST HOUR: CPT | Performed by: INTERNAL MEDICINE

## 2017-08-15 PROCEDURE — 33533 CABG ARTERIAL SINGLE: CPT | Performed by: PHYSICIAN ASSISTANT

## 2017-08-15 PROCEDURE — C1751 CATH, INF, PER/CENT/MIDLINE: HCPCS | Performed by: ANESTHESIOLOGY

## 2017-08-15 PROCEDURE — 33508 ENDOSCOPIC VEIN HARVEST: CPT | Performed by: THORACIC SURGERY (CARDIOTHORACIC VASCULAR SURGERY)

## 2017-08-15 PROCEDURE — P9041 ALBUMIN (HUMAN),5%, 50ML: HCPCS | Performed by: ANESTHESIOLOGY

## 2017-08-15 PROCEDURE — 25010000002 POTASSIUM CHLORIDE PER 2 MEQ OF POTASSIUM

## 2017-08-15 PROCEDURE — 25010000002 PROPOFOL 1000 MG/ML EMULSION: Performed by: THORACIC SURGERY (CARDIOTHORACIC VASCULAR SURGERY)

## 2017-08-15 PROCEDURE — 85347 COAGULATION TIME ACTIVATED: CPT

## 2017-08-15 PROCEDURE — 88304 TISSUE EXAM BY PATHOLOGIST: CPT | Performed by: THORACIC SURGERY (CARDIOTHORACIC VASCULAR SURGERY)

## 2017-08-15 PROCEDURE — 5A1221Z PERFORMANCE OF CARDIAC OUTPUT, CONTINUOUS: ICD-10-PCS | Performed by: THORACIC SURGERY (CARDIOTHORACIC VASCULAR SURGERY)

## 2017-08-15 PROCEDURE — A4648 IMPLANTABLE TISSUE MARKER: HCPCS | Performed by: THORACIC SURGERY (CARDIOTHORACIC VASCULAR SURGERY)

## 2017-08-15 PROCEDURE — 83735 ASSAY OF MAGNESIUM: CPT | Performed by: PHYSICIAN ASSISTANT

## 2017-08-15 PROCEDURE — 82330 ASSAY OF CALCIUM: CPT

## 2017-08-15 PROCEDURE — B24BZZ4 ULTRASONOGRAPHY OF HEART WITH AORTA, TRANSESOPHAGEAL: ICD-10-PCS | Performed by: ANESTHESIOLOGY

## 2017-08-15 PROCEDURE — 82805 BLOOD GASES W/O2 SATURATION: CPT | Performed by: PHYSICIAN ASSISTANT

## 2017-08-15 PROCEDURE — 85014 HEMATOCRIT: CPT | Performed by: THORACIC SURGERY (CARDIOTHORACIC VASCULAR SURGERY)

## 2017-08-15 PROCEDURE — 82947 ASSAY GLUCOSE BLOOD QUANT: CPT

## 2017-08-15 PROCEDURE — 25010000002 HEPARIN (PORCINE) PER 1000 UNITS: Performed by: ANESTHESIOLOGY

## 2017-08-15 PROCEDURE — 99222 1ST HOSP IP/OBS MODERATE 55: CPT | Performed by: INTERNAL MEDICINE

## 2017-08-15 PROCEDURE — 25010000002 CEFUROXIME PER 750 MG: Performed by: ANESTHESIOLOGY

## 2017-08-15 PROCEDURE — 25010000002 CEFUROXIME: Performed by: THORACIC SURGERY (CARDIOTHORACIC VASCULAR SURGERY)

## 2017-08-15 PROCEDURE — 25010000002 PROPOFOL 10 MG/ML EMULSION: Performed by: ANESTHESIOLOGY

## 2017-08-15 PROCEDURE — 25010000002 HYDROMORPHONE PER 4 MG: Performed by: THORACIC SURGERY (CARDIOTHORACIC VASCULAR SURGERY)

## 2017-08-15 PROCEDURE — 25010000002 HEPARIN (PORCINE) PER 1000 UNITS

## 2017-08-15 PROCEDURE — 25010000002 KETOROLAC TROMETHAMINE PER 15 MG: Performed by: NURSE PRACTITIONER

## 2017-08-15 PROCEDURE — 93005 ELECTROCARDIOGRAM TRACING: CPT | Performed by: PHYSICIAN ASSISTANT

## 2017-08-15 PROCEDURE — 25810000003 DEXTROSE 5 % WITH KCL 20 MEQ 20-5 MEQ/L-% SOLUTION: Performed by: PHYSICIAN ASSISTANT

## 2017-08-15 PROCEDURE — 87086 URINE CULTURE/COLONY COUNT: CPT | Performed by: PHYSICIAN ASSISTANT

## 2017-08-15 PROCEDURE — 80048 BASIC METABOLIC PNL TOTAL CA: CPT | Performed by: PHYSICIAN ASSISTANT

## 2017-08-15 PROCEDURE — 25010000002 FENTANYL CITRATE (PF) 100 MCG/2ML SOLUTION: Performed by: THORACIC SURGERY (CARDIOTHORACIC VASCULAR SURGERY)

## 2017-08-15 PROCEDURE — 81001 URINALYSIS AUTO W/SCOPE: CPT | Performed by: PHYSICIAN ASSISTANT

## 2017-08-15 PROCEDURE — 82803 BLOOD GASES ANY COMBINATION: CPT

## 2017-08-15 RX ORDER — ALBUMIN, HUMAN INJ 5% 5 %
500 SOLUTION INTRAVENOUS AS NEEDED
Status: COMPLETED | OUTPATIENT
Start: 2017-08-15 | End: 2017-08-16

## 2017-08-15 RX ORDER — METOPROLOL TARTRATE 5 MG/5ML
2.5 INJECTION INTRAVENOUS EVERY 6 HOURS SCHEDULED
Status: DISCONTINUED | OUTPATIENT
Start: 2017-08-15 | End: 2017-08-16

## 2017-08-15 RX ORDER — FENTANYL CITRATE 50 UG/ML
25 INJECTION, SOLUTION INTRAMUSCULAR; INTRAVENOUS
Status: DISCONTINUED | OUTPATIENT
Start: 2017-08-15 | End: 2017-08-16

## 2017-08-15 RX ORDER — BISACODYL 5 MG/1
10 TABLET, DELAYED RELEASE ORAL DAILY PRN
Status: DISCONTINUED | OUTPATIENT
Start: 2017-08-15 | End: 2017-08-26

## 2017-08-15 RX ORDER — PHENYLEPHRINE HCL IN 0.9% NACL 0.5 MG/5ML
.5-3 SYRINGE (ML) INTRAVENOUS CONTINUOUS PRN
Status: DISCONTINUED | OUTPATIENT
Start: 2017-08-15 | End: 2017-08-17

## 2017-08-15 RX ORDER — MAGNESIUM HYDROXIDE 1200 MG/15ML
LIQUID ORAL AS NEEDED
Status: DISCONTINUED | OUTPATIENT
Start: 2017-08-15 | End: 2017-08-15 | Stop reason: HOSPADM

## 2017-08-15 RX ORDER — POTASSIUM CHLORIDE 750 MG/1
40 CAPSULE, EXTENDED RELEASE ORAL AS NEEDED
Status: DISCONTINUED | OUTPATIENT
Start: 2017-08-15 | End: 2017-09-05 | Stop reason: HOSPADM

## 2017-08-15 RX ORDER — PROTAMINE SULFATE 10 MG/ML
INJECTION, SOLUTION INTRAVENOUS AS NEEDED
Status: DISCONTINUED | OUTPATIENT
Start: 2017-08-15 | End: 2017-08-15 | Stop reason: SURG

## 2017-08-15 RX ORDER — CHLORHEXIDINE GLUCONATE 0.12 MG/ML
15 RINSE ORAL EVERY 12 HOURS SCHEDULED
Status: DISCONTINUED | OUTPATIENT
Start: 2017-08-15 | End: 2017-08-15 | Stop reason: HOSPADM

## 2017-08-15 RX ORDER — ALBUMIN, HUMAN INJ 5% 5 %
SOLUTION INTRAVENOUS
Status: COMPLETED
Start: 2017-08-15 | End: 2017-08-15

## 2017-08-15 RX ORDER — NITROGLYCERIN 20 MG/100ML
5-200 INJECTION INTRAVENOUS CONTINUOUS PRN
Status: DISCONTINUED | OUTPATIENT
Start: 2017-08-15 | End: 2017-08-17

## 2017-08-15 RX ORDER — NALOXONE HCL 0.4 MG/ML
0.4 VIAL (ML) INJECTION
Status: DISCONTINUED | OUTPATIENT
Start: 2017-08-15 | End: 2017-08-16

## 2017-08-15 RX ORDER — ACETAMINOPHEN 325 MG/1
650 TABLET ORAL EVERY 4 HOURS PRN
Status: DISCONTINUED | OUTPATIENT
Start: 2017-08-15 | End: 2017-08-17

## 2017-08-15 RX ORDER — SODIUM CHLORIDE 9 MG/ML
30 INJECTION, SOLUTION INTRAVENOUS CONTINUOUS PRN
Status: DISCONTINUED | OUTPATIENT
Start: 2017-08-15 | End: 2017-08-16

## 2017-08-15 RX ORDER — ASPIRIN 325 MG
325 TABLET, DELAYED RELEASE (ENTERIC COATED) ORAL DAILY
Status: DISCONTINUED | OUTPATIENT
Start: 2017-08-16 | End: 2017-09-05 | Stop reason: HOSPADM

## 2017-08-15 RX ORDER — PAPAVERINE HYDROCHLORIDE 30 MG/ML
INJECTION INTRAMUSCULAR; INTRAVENOUS AS NEEDED
Status: DISCONTINUED | OUTPATIENT
Start: 2017-08-15 | End: 2017-08-15 | Stop reason: HOSPADM

## 2017-08-15 RX ORDER — MIDAZOLAM HYDROCHLORIDE 1 MG/ML
INJECTION INTRAMUSCULAR; INTRAVENOUS AS NEEDED
Status: DISCONTINUED | OUTPATIENT
Start: 2017-08-15 | End: 2017-08-15 | Stop reason: SURG

## 2017-08-15 RX ORDER — ESMOLOL HYDROCHLORIDE 10 MG/ML
INJECTION INTRAVENOUS AS NEEDED
Status: DISCONTINUED | OUTPATIENT
Start: 2017-08-15 | End: 2017-08-15 | Stop reason: SURG

## 2017-08-15 RX ORDER — DOPAMINE HYDROCHLORIDE 160 MG/100ML
2-20 INJECTION, SOLUTION INTRAVENOUS CONTINUOUS PRN
Status: DISCONTINUED | OUTPATIENT
Start: 2017-08-15 | End: 2017-08-16

## 2017-08-15 RX ORDER — VECURONIUM BROMIDE 1 MG/ML
INJECTION, POWDER, LYOPHILIZED, FOR SOLUTION INTRAVENOUS AS NEEDED
Status: DISCONTINUED | OUTPATIENT
Start: 2017-08-15 | End: 2017-08-15 | Stop reason: SURG

## 2017-08-15 RX ORDER — ROCURONIUM BROMIDE 10 MG/ML
INJECTION, SOLUTION INTRAVENOUS AS NEEDED
Status: DISCONTINUED | OUTPATIENT
Start: 2017-08-15 | End: 2017-08-15 | Stop reason: SURG

## 2017-08-15 RX ORDER — SODIUM CHLORIDE, SODIUM LACTATE, POTASSIUM CHLORIDE, CALCIUM CHLORIDE 600; 310; 30; 20 MG/100ML; MG/100ML; MG/100ML; MG/100ML
9 INJECTION, SOLUTION INTRAVENOUS CONTINUOUS
Status: DISCONTINUED | OUTPATIENT
Start: 2017-08-15 | End: 2017-08-15

## 2017-08-15 RX ORDER — ALBUMIN, HUMAN INJ 5% 5 %
SOLUTION INTRAVENOUS CONTINUOUS PRN
Status: DISCONTINUED | OUTPATIENT
Start: 2017-08-15 | End: 2017-08-15 | Stop reason: SURG

## 2017-08-15 RX ORDER — PROPOFOL 10 MG/ML
VIAL (ML) INTRAVENOUS AS NEEDED
Status: DISCONTINUED | OUTPATIENT
Start: 2017-08-15 | End: 2017-08-15 | Stop reason: SURG

## 2017-08-15 RX ORDER — AMINOCAPROIC ACID 250 MG/ML
INJECTION, SOLUTION INTRAVENOUS AS NEEDED
Status: DISCONTINUED | OUTPATIENT
Start: 2017-08-15 | End: 2017-08-15 | Stop reason: SURG

## 2017-08-15 RX ORDER — MAGNESIUM SULFATE HEPTAHYDRATE 40 MG/ML
2 INJECTION, SOLUTION INTRAVENOUS AS NEEDED
Status: DISCONTINUED | OUTPATIENT
Start: 2017-08-15 | End: 2017-08-17

## 2017-08-15 RX ORDER — HYDROCODONE BITARTRATE AND ACETAMINOPHEN 7.5; 325 MG/1; MG/1
1 TABLET ORAL EVERY 4 HOURS PRN
Status: DISPENSED | OUTPATIENT
Start: 2017-08-15 | End: 2017-08-25

## 2017-08-15 RX ORDER — LIDOCAINE HYDROCHLORIDE 10 MG/ML
0.5 INJECTION, SOLUTION EPIDURAL; INFILTRATION; INTRACAUDAL; PERINEURAL ONCE AS NEEDED
Status: DISCONTINUED | OUTPATIENT
Start: 2017-08-15 | End: 2017-08-15 | Stop reason: HOSPADM

## 2017-08-15 RX ORDER — MEPERIDINE HYDROCHLORIDE 25 MG/ML
25 INJECTION INTRAMUSCULAR; INTRAVENOUS; SUBCUTANEOUS EVERY 4 HOURS PRN
Status: DISCONTINUED | OUTPATIENT
Start: 2017-08-15 | End: 2017-08-16

## 2017-08-15 RX ORDER — DEXMEDETOMIDINE HYDROCHLORIDE 4 UG/ML
.2-1.5 INJECTION, SOLUTION INTRAVENOUS CONTINUOUS PRN
Status: DISCONTINUED | OUTPATIENT
Start: 2017-08-15 | End: 2017-08-16

## 2017-08-15 RX ORDER — POTASSIUM CHLORIDE 29.8 MG/ML
20 INJECTION INTRAVENOUS
Status: DISCONTINUED | OUTPATIENT
Start: 2017-08-15 | End: 2017-08-17

## 2017-08-15 RX ORDER — CYCLOBENZAPRINE HCL 10 MG
10 TABLET ORAL 3 TIMES DAILY
Status: DISCONTINUED | OUTPATIENT
Start: 2017-08-15 | End: 2017-09-05 | Stop reason: HOSPADM

## 2017-08-15 RX ORDER — GLYCOPYRROLATE 0.2 MG/ML
INJECTION INTRAMUSCULAR; INTRAVENOUS AS NEEDED
Status: DISCONTINUED | OUTPATIENT
Start: 2017-08-15 | End: 2017-08-15 | Stop reason: SURG

## 2017-08-15 RX ORDER — SENNA AND DOCUSATE SODIUM 50; 8.6 MG/1; MG/1
2 TABLET, FILM COATED ORAL 2 TIMES DAILY
Status: DISCONTINUED | OUTPATIENT
Start: 2017-08-15 | End: 2017-08-26

## 2017-08-15 RX ORDER — SODIUM CHLORIDE 0.9 % (FLUSH) 0.9 %
1-10 SYRINGE (ML) INJECTION AS NEEDED
Status: DISCONTINUED | OUTPATIENT
Start: 2017-08-15 | End: 2017-08-15 | Stop reason: HOSPADM

## 2017-08-15 RX ORDER — NALOXONE HCL 0.4 MG/ML
0.4 VIAL (ML) INJECTION
Status: DISCONTINUED | OUTPATIENT
Start: 2017-08-15 | End: 2017-08-26

## 2017-08-15 RX ORDER — FAMOTIDINE 20 MG/1
20 TABLET, FILM COATED ORAL 2 TIMES DAILY
Status: DISCONTINUED | OUTPATIENT
Start: 2017-08-15 | End: 2017-08-16

## 2017-08-15 RX ORDER — HYDROMORPHONE HYDROCHLORIDE 1 MG/ML
0.2 INJECTION, SOLUTION INTRAMUSCULAR; INTRAVENOUS; SUBCUTANEOUS
Status: DISCONTINUED | OUTPATIENT
Start: 2017-08-15 | End: 2017-08-15

## 2017-08-15 RX ORDER — PROPOFOL 10 MG/ML
VIAL (ML) INTRAVENOUS CONTINUOUS PRN
Status: DISCONTINUED | OUTPATIENT
Start: 2017-08-15 | End: 2017-08-15 | Stop reason: SURG

## 2017-08-15 RX ORDER — POTASSIUM CHLORIDE 1.5 G/1.77G
40 POWDER, FOR SOLUTION ORAL AS NEEDED
Status: DISCONTINUED | OUTPATIENT
Start: 2017-08-15 | End: 2017-08-26

## 2017-08-15 RX ORDER — SODIUM CHLORIDE 9 MG/ML
INJECTION, SOLUTION INTRAVENOUS AS NEEDED
Status: DISCONTINUED | OUTPATIENT
Start: 2017-08-15 | End: 2017-08-15 | Stop reason: HOSPADM

## 2017-08-15 RX ORDER — HEPARIN SODIUM 1000 [USP'U]/ML
INJECTION, SOLUTION INTRAVENOUS; SUBCUTANEOUS AS NEEDED
Status: DISCONTINUED | OUTPATIENT
Start: 2017-08-15 | End: 2017-08-15 | Stop reason: SURG

## 2017-08-15 RX ORDER — SUFENTANIL CITRATE 50 UG/ML
INJECTION EPIDURAL; INTRAVENOUS AS NEEDED
Status: DISCONTINUED | OUTPATIENT
Start: 2017-08-15 | End: 2017-08-15 | Stop reason: SURG

## 2017-08-15 RX ORDER — MAGNESIUM SULFATE HEPTAHYDRATE 40 MG/ML
4 INJECTION, SOLUTION INTRAVENOUS AS NEEDED
Status: DISCONTINUED | OUTPATIENT
Start: 2017-08-15 | End: 2017-08-17

## 2017-08-15 RX ORDER — SODIUM CHLORIDE 0.9 % (FLUSH) 0.9 %
30 SYRINGE (ML) INJECTION ONCE AS NEEDED
Status: DISCONTINUED | OUTPATIENT
Start: 2017-08-15 | End: 2017-08-16

## 2017-08-15 RX ORDER — KETOROLAC TROMETHAMINE 30 MG/ML
30 INJECTION, SOLUTION INTRAMUSCULAR; INTRAVENOUS ONCE AS NEEDED
Status: COMPLETED | OUTPATIENT
Start: 2017-08-15 | End: 2017-08-15

## 2017-08-15 RX ORDER — DOBUTAMINE HYDROCHLORIDE 100 MG/100ML
2-20 INJECTION INTRAVENOUS CONTINUOUS PRN
Status: DISCONTINUED | OUTPATIENT
Start: 2017-08-15 | End: 2017-08-16

## 2017-08-15 RX ORDER — BISACODYL 10 MG
10 SUPPOSITORY, RECTAL RECTAL DAILY PRN
Status: DISCONTINUED | OUTPATIENT
Start: 2017-08-16 | End: 2017-08-17

## 2017-08-15 RX ORDER — CHLORHEXIDINE GLUCONATE 0.12 MG/ML
15 RINSE ORAL EVERY 12 HOURS SCHEDULED
Status: DISCONTINUED | OUTPATIENT
Start: 2017-08-15 | End: 2017-08-16

## 2017-08-15 RX ORDER — POTASSIUM CHLORIDE, DEXTROSE MONOHYDRATE 150; 5 MG/100ML; G/100ML
30 INJECTION, SOLUTION INTRAVENOUS CONTINUOUS
Status: DISCONTINUED | OUTPATIENT
Start: 2017-08-15 | End: 2017-08-16

## 2017-08-15 RX ORDER — ASPIRIN 325 MG
325 TABLET ORAL ONCE
Status: COMPLETED | OUTPATIENT
Start: 2017-08-15 | End: 2017-08-15

## 2017-08-15 RX ORDER — HYDROMORPHONE HYDROCHLORIDE 1 MG/ML
0.25 INJECTION, SOLUTION INTRAMUSCULAR; INTRAVENOUS; SUBCUTANEOUS
Status: DISCONTINUED | OUTPATIENT
Start: 2017-08-15 | End: 2017-08-15

## 2017-08-15 RX ORDER — ONDANSETRON 2 MG/ML
4 INJECTION INTRAMUSCULAR; INTRAVENOUS EVERY 6 HOURS PRN
Status: DISCONTINUED | OUTPATIENT
Start: 2017-08-15 | End: 2017-09-01 | Stop reason: SDUPTHER

## 2017-08-15 RX ORDER — DOCUSATE SODIUM 100 MG/1
100 CAPSULE, LIQUID FILLED ORAL 2 TIMES DAILY PRN
Status: DISCONTINUED | OUTPATIENT
Start: 2017-08-15 | End: 2017-08-26

## 2017-08-15 RX ORDER — FAMOTIDINE 10 MG/ML
20 INJECTION, SOLUTION INTRAVENOUS 2 TIMES DAILY
Status: DISCONTINUED | OUTPATIENT
Start: 2017-08-15 | End: 2017-08-16

## 2017-08-15 RX ORDER — OXYCODONE HYDROCHLORIDE AND ACETAMINOPHEN 5; 325 MG/1; MG/1
2 TABLET ORAL EVERY 4 HOURS PRN
Status: DISPENSED | OUTPATIENT
Start: 2017-08-15 | End: 2017-08-25

## 2017-08-15 RX ORDER — ATORVASTATIN CALCIUM 40 MG/1
40 TABLET, FILM COATED ORAL NIGHTLY
Status: DISCONTINUED | OUTPATIENT
Start: 2017-08-15 | End: 2017-09-05 | Stop reason: HOSPADM

## 2017-08-15 RX ADMIN — PROTAMINE SULFATE 350 MG: 10 INJECTION, SOLUTION INTRAVENOUS at 13:10

## 2017-08-15 RX ADMIN — HYDROMORPHONE HYDROCHLORIDE 1 MG: 1 INJECTION, SOLUTION INTRAMUSCULAR; INTRAVENOUS; SUBCUTANEOUS at 21:49

## 2017-08-15 RX ADMIN — SODIUM CHLORIDE 4 UNITS/HR: 9 INJECTION, SOLUTION INTRAVENOUS at 09:00

## 2017-08-15 RX ADMIN — ALBUMIN HUMAN 500 ML: 0.05 INJECTION, SOLUTION INTRAVENOUS at 17:15

## 2017-08-15 RX ADMIN — PROPOFOL 25 MCG/KG/MIN: 10 INJECTION, EMULSION INTRAVENOUS at 13:39

## 2017-08-15 RX ADMIN — HYDROMORPHONE HYDROCHLORIDE 1 MG: 1 INJECTION, SOLUTION INTRAMUSCULAR; INTRAVENOUS; SUBCUTANEOUS at 23:48

## 2017-08-15 RX ADMIN — AMINOCAPROIC ACID 10 G: 250 INJECTION, SOLUTION INTRAVENOUS at 09:00

## 2017-08-15 RX ADMIN — GABAPENTIN 300 MG: 300 CAPSULE ORAL at 20:20

## 2017-08-15 RX ADMIN — SUFENTANIL CITRATE 100 MCG: 50 INJECTION EPIDURAL; INTRAVENOUS at 09:10

## 2017-08-15 RX ADMIN — SODIUM CHLORIDE 1.6 UNITS/HR: 9 INJECTION, SOLUTION INTRAVENOUS at 15:00

## 2017-08-15 RX ADMIN — VECURONIUM BROMIDE 10 MG: 1 INJECTION, POWDER, LYOPHILIZED, FOR SOLUTION INTRAVENOUS at 10:20

## 2017-08-15 RX ADMIN — ASPIRIN 325 MG ORAL TABLET 325 MG: 325 PILL ORAL at 17:32

## 2017-08-15 RX ADMIN — CYCLOBENZAPRINE HYDROCHLORIDE 10 MG: 10 TABLET, FILM COATED ORAL at 21:49

## 2017-08-15 RX ADMIN — POTASSIUM CHLORIDE AND DEXTROSE MONOHYDRATE 30 ML/HR: 150; 5 INJECTION, SOLUTION INTRAVENOUS at 15:16

## 2017-08-15 RX ADMIN — SUFENTANIL CITRATE 100 MCG: 50 INJECTION EPIDURAL; INTRAVENOUS at 14:30

## 2017-08-15 RX ADMIN — POTASSIUM CHLORIDE AND DEXTROSE MONOHYDRATE 30 ML/HR: 150; 5 INJECTION, SOLUTION INTRAVENOUS at 15:17

## 2017-08-15 RX ADMIN — PROPOFOL 160 MG: 10 INJECTION, EMULSION INTRAVENOUS at 08:23

## 2017-08-15 RX ADMIN — PANTOPRAZOLE SODIUM 40 MG: 40 TABLET, DELAYED RELEASE ORAL at 05:51

## 2017-08-15 RX ADMIN — MUPIROCIN: 20 OINTMENT TOPICAL at 06:17

## 2017-08-15 RX ADMIN — SODIUM CHLORIDE 7.2 UNITS/HR: 9 INJECTION, SOLUTION INTRAVENOUS at 20:10

## 2017-08-15 RX ADMIN — SODIUM CHLORIDE, POTASSIUM CHLORIDE, SODIUM LACTATE AND CALCIUM CHLORIDE: 600; 310; 30; 20 INJECTION, SOLUTION INTRAVENOUS at 08:23

## 2017-08-15 RX ADMIN — SODIUM CHLORIDE, POTASSIUM CHLORIDE, SODIUM LACTATE AND CALCIUM CHLORIDE 9 ML/HR: 600; 310; 30; 20 INJECTION, SOLUTION INTRAVENOUS at 06:17

## 2017-08-15 RX ADMIN — CEFUROXIME 1.5 G: 1.5 INJECTION, POWDER, FOR SOLUTION INTRAVENOUS at 13:20

## 2017-08-15 RX ADMIN — FAMOTIDINE 20 MG: 10 INJECTION, SOLUTION INTRAVENOUS at 18:18

## 2017-08-15 RX ADMIN — MUPIROCIN 1 APPLICATION: 20 OINTMENT TOPICAL at 06:13

## 2017-08-15 RX ADMIN — AMINOCAPROIC ACID 10 G: 250 INJECTION, SOLUTION INTRAVENOUS at 13:20

## 2017-08-15 RX ADMIN — CHLORHEXIDINE GLUCONATE 15 ML: 1.2 RINSE ORAL at 06:16

## 2017-08-15 RX ADMIN — CHLORHEXIDINE GLUCONATE 15 ML: 1.2 RINSE ORAL at 20:20

## 2017-08-15 RX ADMIN — ROCURONIUM BROMIDE 100 MG: 10 INJECTION INTRAVENOUS at 08:23

## 2017-08-15 RX ADMIN — FENTANYL CITRATE 25 MCG: 50 INJECTION, SOLUTION INTRAMUSCULAR; INTRAVENOUS at 17:56

## 2017-08-15 RX ADMIN — ESMOLOL HYDROCHLORIDE 5 MG: 10 INJECTION, SOLUTION INTRAVENOUS at 08:23

## 2017-08-15 RX ADMIN — FENTANYL CITRATE 25 MCG: 50 INJECTION, SOLUTION INTRAMUSCULAR; INTRAVENOUS at 19:02

## 2017-08-15 RX ADMIN — HYDROMORPHONE HYDROCHLORIDE 0.25 MG: 1 INJECTION, SOLUTION INTRAMUSCULAR; INTRAVENOUS; SUBCUTANEOUS at 20:24

## 2017-08-15 RX ADMIN — HEPARIN SODIUM 40000 UNITS: 1000 INJECTION, SOLUTION INTRAVENOUS; SUBCUTANEOUS at 10:55

## 2017-08-15 RX ADMIN — CEFUROXIME 1.5 G: 1.5 INJECTION, POWDER, FOR SOLUTION INTRAVENOUS at 20:01

## 2017-08-15 RX ADMIN — ALBUMIN HUMAN 500 ML: 0.05 INJECTION, SOLUTION INTRAVENOUS at 15:17

## 2017-08-15 RX ADMIN — FENTANYL CITRATE 25 MCG: 50 INJECTION, SOLUTION INTRAMUSCULAR; INTRAVENOUS at 17:32

## 2017-08-15 RX ADMIN — MIDAZOLAM HYDROCHLORIDE 5 MG: 1 INJECTION, SOLUTION INTRAMUSCULAR; INTRAVENOUS at 12:45

## 2017-08-15 RX ADMIN — MIDAZOLAM HYDROCHLORIDE 5 MG: 1 INJECTION, SOLUTION INTRAMUSCULAR; INTRAVENOUS at 08:23

## 2017-08-15 RX ADMIN — KETOROLAC TROMETHAMINE 30 MG: 30 INJECTION, SOLUTION INTRAMUSCULAR at 20:01

## 2017-08-15 RX ADMIN — VECURONIUM BROMIDE 10 MG: 1 INJECTION, POWDER, LYOPHILIZED, FOR SOLUTION INTRAVENOUS at 12:45

## 2017-08-15 RX ADMIN — CEFUROXIME 1.5 G: 1.5 INJECTION, POWDER, FOR SOLUTION INTRAVENOUS at 08:55

## 2017-08-15 RX ADMIN — ALBUMIN HUMAN: 0.05 INJECTION, SOLUTION INTRAVENOUS at 13:59

## 2017-08-15 RX ADMIN — PROPOFOL 25 MCG/KG/MIN: 10 INJECTION, EMULSION INTRAVENOUS at 15:20

## 2017-08-15 RX ADMIN — HYDROMORPHONE HYDROCHLORIDE 0.25 MG: 1 INJECTION, SOLUTION INTRAMUSCULAR; INTRAVENOUS; SUBCUTANEOUS at 19:24

## 2017-08-15 RX ADMIN — GLYCOPYRROLATE 0.4 MG: 0.2 INJECTION, SOLUTION INTRAMUSCULAR; INTRAVENOUS at 13:06

## 2017-08-15 RX ADMIN — SUFENTANIL CITRATE 50 MCG: 50 INJECTION EPIDURAL; INTRAVENOUS at 12:45

## 2017-08-15 RX ADMIN — SUFENTANIL CITRATE 100 MCG: 50 INJECTION EPIDURAL; INTRAVENOUS at 08:23

## 2017-08-15 RX ADMIN — ATORVASTATIN CALCIUM 40 MG: 40 TABLET, FILM COATED ORAL at 20:20

## 2017-08-15 NOTE — ANESTHESIA PROCEDURE NOTES
Central Line    Patient location during procedure: OR  Indications: vascular access  Preanesthetic Checklist  Completed: patient identified, site marked, surgical consent, pre-op evaluation, timeout performed, IV checked, risks and benefits discussed and monitors and equipment checked  Central Line Prep  Sterile Tech:cap, gloves, gown, mask and sterile barriers  Prep: chloraprep  Patient monitoring: blood pressure monitoring, continuous pulse oximetry and EKG  Central Line Procedure  Laterality:right  Location:internal jugular  Catheter Type:Cordis and [place]  Catheter Size:9 Fr  Guidance:ultrasound guided  PROCEDURE NOTE/ULTRASOUND INTERPRETATION.  Using ultrasound guidance the potential vascular sites for insertion of the catheter were visualized to determine the patency of the vessel to be used for vascular access.  After selecting the appropriate site for insertion, the needle was visualized under ultrasound being inserted into the internal jugular vein, followed by ultrasound confirmation of wire and catheter placement. There were no abnormalities seen on ultrasound; an image was taken; and the patient tolerated the procedure with no complications.   Assessment  Post procedure:biopatch applied, line sutured, occlusive dressing applied and secured with tape  Assessement:blood return through all ports, free fluid flow and chest x-ray ordered  Complications:no  Patient Tolerance:patient tolerated the procedure well with no apparent complications

## 2017-08-15 NOTE — ANESTHESIA PROCEDURE NOTES
Arterial Line    Patient location during procedure: pre-op   Line placed for hemodynamic monitoring.  Preanesthetic Checklist  Completed: patient identified, site marked, surgical consent, pre-op evaluation, timeout performed, IV checked, risks and benefits discussed and monitors and equipment checked  Arterial Line Prep   Sterile Tech: cap, gloves and sterile barriers  Prep: ChloraPrep  Patient monitoring: blood pressure monitoring, continuous pulse oximetry and EKG  Arterial Line Procedure   Laterality:left  Location:  radial artery  Catheter size: 20 G   Guidance: ultrasound guided and palpation technique  Number of attempts: 1  Successful placement: yes          Post Assessment   Dressing Type: line sutured, occlusive dressing applied, secured with tape and wrist guard applied.   Complications no  Circ/Move/Sens Assessment: normal and unchanged.   Patient Tolerance: patient tolerated the procedure well with no apparent complications

## 2017-08-15 NOTE — ANESTHESIA POSTPROCEDURE EVALUATION
Patient: Denzel Harding    Procedure Summary     Date Anesthesia Start Anesthesia Stop Room / Location    08/15/17 0812 1450  [facility] 09 / [facility]       Procedure Diagnosis Surgeon Provider    CORONARY ARTERY BYPASS GRAFTING x 3 utilizing the left internal mammmary artery with endoscopic vein harvesting of the right greater saphenous vein ,HAMLET per anesthesia, LAD endarectomy (N/A Chest) Coronary artery disease involving native coronary artery of native heart with unstable angina pectoris  (Coronary artery disease involving native coronary artery of native heart with unstable angina pectoris [I25.110]) MD David Fink MD          Anesthesia Type: general  Last vitals  BP        Temp        Pulse       Resp        SpO2          Post Anesthesia Care and Evaluation    Patient location during evaluation: ICU  Patient participation: complete - patient cannot participate  Level of consciousness: obtunded/minimal responses  Pain score: 0  Pain management: adequate  Airway patency: patent  Anesthetic complications: No anesthetic complications    Cardiovascular status: acceptable  Respiratory status: ETT and intubated

## 2017-08-15 NOTE — ANESTHESIA PREPROCEDURE EVALUATION
Anesthesia Evaluation     Patient summary reviewed and Nursing notes reviewed   NPO Solid Status: > 8 hours  NPO Liquid Status: > 8 hours     Airway   Mallampati: III  TM distance: >3 FB  Neck ROM: full  possible difficult intubation  Dental          Pulmonary    (+) asthma, shortness of breath, sleep apnea on CPAP,   Cardiovascular     Rhythm: regular  Rate: normal    (+) hypertension, CAD, cardiac stents angina, CHF, HUTCHINS, hyperlipidemia      Neuro/Psych  (+) CVA residual symptoms, dizziness/light headedness,    GI/Hepatic/Renal/Endo    (+) obesity,  GERD, diabetes mellitus poorly controlled using insulin,     Musculoskeletal     Abdominal    Substance History      OB/GYN          Other   (+) arthritis         Phys Exam Other: Several chipped teeth                                Anesthesia Plan    ASA 4     general   ([place], PAC, jean)  intravenous induction   Anesthetic plan and risks discussed with patient and spouse/significant other.  Use of blood products discussed with patient .

## 2017-08-15 NOTE — ANESTHESIA PROCEDURE NOTES
Procedure Performed: Emergent/Open-Heart Anesthesia HAMLET     Start Time:        End Time:      Preanesthesia Checklist:  Patient identified, IV assessed, risks and benefits discussed, monitors and equipment assessed, procedure being performed at surgeon's request and anesthesia consent obtained.    General Procedure Information  Diagnostic Indications for Echo:  assessment of ascending aorta and assessment of surgical repair  Physician Requesting Echo: RINA NIELSON  Location performed:  OR  Intubated  Bite block placed  Heart visualized  Probe Insertion:  Easy  Probe Type:  Multiplane  Modalities:  Color flow mapping, continuous wave Doppler and pulse wave Doppler    Echocardiographic and Doppler Measurements    Ventricles    Right Ventricle:  Cavity size dilated.  Hypertrophy not present.  Thrombus not present.  Global function normal.    Left Ventricle:  Cavity size dilated.  Diastolic dimension 1.8 cm.  Hypertrophy present.  Thrombus not present.  Global Function normal.  Ejection Fraction 60%.          Valves    Aortic Valve:  Annulus normal.  Stenosis not present.  Area: 3.2 cm².  Regurgitation absent.  Leaflets normal.  Leaflet motions normal.      Mitral Valve:  Annulus normal.  Stenosis not present.  Area: 2.4 cm².  Mean Gradient: 2 mean 1 mmHg.  Regurgitation trace.  Leaflets normal.  Leaflet motions normal.      Tricuspid Valve:  Annulus normal.  Stenosis not present.  Regurgitation trace.  Leaflets normal.  Leaflet motions normal.    Pulmonic Valve:  Annulus normal.          Aorta    Ascending Aorta:  Size normal.  Dissection not present.  Plaque thickness less than 3 mm.  Mobile plaque not present.    Aortic Arch:  Size normal.  Dissection not present.  Plaque thickness less than 3 mm.  Mobile plaque not present.    Descending Aorta:  Size normal.  Dissection not present.  Plaque thickness less than 3 mm.  Mobile plaque not present.          Atria    Right Atrium:  Size normal.  Spontaneous echo  contrast not present.  Thrombus not present.  Tumor not present.  Device present.    Left Atrium:  Size dilated.  Spontaneous echo contrast not present.  Thrombus not present.  Tumor not present.  Device not present.    Left atrial appendage normal.      Septa    Atrial Septum:  Intra-atrial septal morphology normal.      Ventricular Septum:  Intra-ventricular septum morphology normal.      Diastolic Function Measurements:  Diastolic Dysfunction Grade=  E= ms  A= ms  E/A Ratio= 1.4  DT= ms  S/D= 1.4  IVRT=    Other Findings  Pericardium:  normal  Pleural Effusion:  none  Pulmonary Arteries:  normal  Pulmonary Venous Flow:  normal    Anesthesia Information  Performed Personally  Anesthesiologist:  JACIEL VAZQUEZ      Echocardiogram Comments:       Unable to obtain AVG.  Post CABG:  No NWMA, EF > 60% with LV underfilled

## 2017-08-16 ENCOUNTER — APPOINTMENT (OUTPATIENT)
Dept: GENERAL RADIOLOGY | Facility: HOSPITAL | Age: 57
End: 2017-08-16

## 2017-08-16 LAB
ABO + RH BLD: NORMAL
ABO + RH BLD: NORMAL
ACT BLD: NORMAL SECONDS (ref 82–152)
ALBUMIN SERPL-MCNC: 4.1 G/DL (ref 3.2–4.8)
ANION GAP SERPL CALCULATED.3IONS-SCNC: 5 MMOL/L (ref 3–11)
BASOPHILS # BLD AUTO: 0.01 10*3/MM3 (ref 0–0.2)
BASOPHILS NFR BLD AUTO: 0.1 % (ref 0–1)
BH BB BLOOD EXPIRATION DATE: NORMAL
BH BB BLOOD EXPIRATION DATE: NORMAL
BH BB BLOOD TYPE BARCODE: 6200
BH BB BLOOD TYPE BARCODE: 6200
BH BB DISPENSE STATUS: NORMAL
BH BB DISPENSE STATUS: NORMAL
BH BB PRODUCT CODE: NORMAL
BH BB PRODUCT CODE: NORMAL
BH BB UNIT NUMBER: NORMAL
BH BB UNIT NUMBER: NORMAL
BUN BLD-MCNC: 18 MG/DL (ref 9–23)
BUN/CREAT SERPL: 22.5 (ref 7–25)
CALCIUM SPEC-SCNC: 8.8 MG/DL (ref 8.7–10.4)
CHLORIDE SERPL-SCNC: 106 MMOL/L (ref 99–109)
CO2 SERPL-SCNC: 26 MMOL/L (ref 20–31)
CREAT BLD-MCNC: 0.8 MG/DL (ref 0.6–1.3)
CROSSMATCH INTERPRETATION: NORMAL
CROSSMATCH INTERPRETATION: NORMAL
DEPRECATED RDW RBC AUTO: 39.6 FL (ref 37–54)
EOSINOPHIL # BLD AUTO: 0.01 10*3/MM3 (ref 0–0.3)
EOSINOPHIL NFR BLD AUTO: 0.1 % (ref 0–3)
ERYTHROCYTE [DISTWIDTH] IN BLOOD BY AUTOMATED COUNT: 13.1 % (ref 11.3–14.5)
GFR SERPL CREATININE-BSD FRML MDRD: 100 ML/MIN/1.73
GLUCOSE BLD-MCNC: 140 MG/DL (ref 70–100)
GLUCOSE BLDC GLUCOMTR-MCNC: 109 MG/DL (ref 70–130)
GLUCOSE BLDC GLUCOMTR-MCNC: 122 MG/DL (ref 70–130)
GLUCOSE BLDC GLUCOMTR-MCNC: 125 MG/DL (ref 70–130)
GLUCOSE BLDC GLUCOMTR-MCNC: 131 MG/DL (ref 70–130)
GLUCOSE BLDC GLUCOMTR-MCNC: 140 MG/DL (ref 70–130)
GLUCOSE BLDC GLUCOMTR-MCNC: 145 MG/DL (ref 70–130)
GLUCOSE BLDC GLUCOMTR-MCNC: 145 MG/DL (ref 70–130)
GLUCOSE BLDC GLUCOMTR-MCNC: 151 MG/DL (ref 70–130)
GLUCOSE BLDC GLUCOMTR-MCNC: 172 MG/DL (ref 70–130)
GLUCOSE BLDC GLUCOMTR-MCNC: 187 MG/DL (ref 70–130)
GLUCOSE BLDC GLUCOMTR-MCNC: 233 MG/DL (ref 70–130)
GLUCOSE BLDC GLUCOMTR-MCNC: 296 MG/DL (ref 70–130)
HCT VFR BLD AUTO: 33.3 % (ref 38.9–50.9)
HGB BLD-MCNC: 11 G/DL (ref 13.1–17.5)
IMM GRANULOCYTES # BLD: 0.04 10*3/MM3 (ref 0–0.03)
IMM GRANULOCYTES NFR BLD: 0.4 % (ref 0–0.6)
INR PPP: 0.97
LYMPHOCYTES # BLD AUTO: 0.8 10*3/MM3 (ref 0.6–4.8)
LYMPHOCYTES NFR BLD AUTO: 8.4 % (ref 24–44)
MAGNESIUM SERPL-MCNC: 2.4 MG/DL (ref 1.3–2.7)
MCH RBC QN AUTO: 27.5 PG (ref 27–31)
MCHC RBC AUTO-ENTMCNC: 33 G/DL (ref 32–36)
MCV RBC AUTO: 83.3 FL (ref 80–99)
MONOCYTES # BLD AUTO: 0.73 10*3/MM3 (ref 0–1)
MONOCYTES NFR BLD AUTO: 7.7 % (ref 0–12)
NEUTROPHILS # BLD AUTO: 7.89 10*3/MM3 (ref 1.5–8.3)
NEUTROPHILS NFR BLD AUTO: 83.3 % (ref 41–71)
PA ADP PRP-ACNC: 252 PRU
PHOSPHATE SERPL-MCNC: 3.9 MG/DL (ref 2.4–5.1)
PLATELET # BLD AUTO: 128 10*3/MM3 (ref 150–450)
PMV BLD AUTO: 10.9 FL (ref 6–12)
POTASSIUM BLD-SCNC: 4.7 MMOL/L (ref 3.5–5.5)
PROTHROMBIN TIME: 10.6 SECONDS (ref 9.6–11.5)
RBC # BLD AUTO: 4 10*6/MM3 (ref 4.2–5.76)
SODIUM BLD-SCNC: 137 MMOL/L (ref 132–146)
UNIT  ABO: NORMAL
UNIT  ABO: NORMAL
UNIT  RH: NORMAL
UNIT  RH: NORMAL
WBC NRBC COR # BLD: 9.48 10*3/MM3 (ref 3.5–10.8)

## 2017-08-16 PROCEDURE — 93005 ELECTROCARDIOGRAM TRACING: CPT | Performed by: PHYSICIAN ASSISTANT

## 2017-08-16 PROCEDURE — 25010000002 CEFUROXIME: Performed by: THORACIC SURGERY (CARDIOTHORACIC VASCULAR SURGERY)

## 2017-08-16 PROCEDURE — 99232 SBSQ HOSP IP/OBS MODERATE 35: CPT | Performed by: INTERNAL MEDICINE

## 2017-08-16 PROCEDURE — 25010000002 KETOROLAC TROMETHAMINE PER 15 MG: Performed by: INTERNAL MEDICINE

## 2017-08-16 PROCEDURE — 85025 COMPLETE CBC W/AUTO DIFF WBC: CPT | Performed by: PHYSICIAN ASSISTANT

## 2017-08-16 PROCEDURE — 63710000001 INSULIN LISPRO (HUMAN) PER 5 UNITS: Performed by: INTERNAL MEDICINE

## 2017-08-16 PROCEDURE — 25010000002 ALBUMIN HUMAN 5% PER 50 ML

## 2017-08-16 PROCEDURE — 85576 BLOOD PLATELET AGGREGATION: CPT | Performed by: PHYSICIAN ASSISTANT

## 2017-08-16 PROCEDURE — 97162 PT EVAL MOD COMPLEX 30 MIN: CPT

## 2017-08-16 PROCEDURE — 80069 RENAL FUNCTION PANEL: CPT | Performed by: PHYSICIAN ASSISTANT

## 2017-08-16 PROCEDURE — 82962 GLUCOSE BLOOD TEST: CPT

## 2017-08-16 PROCEDURE — P9041 ALBUMIN (HUMAN),5%, 50ML: HCPCS

## 2017-08-16 PROCEDURE — 25010000002 HYDROMORPHONE PER 4 MG: Performed by: THORACIC SURGERY (CARDIOTHORACIC VASCULAR SURGERY)

## 2017-08-16 PROCEDURE — 85610 PROTHROMBIN TIME: CPT | Performed by: PHYSICIAN ASSISTANT

## 2017-08-16 PROCEDURE — 25010000002 HYDROMORPHONE PER 4 MG: Performed by: INTERNAL MEDICINE

## 2017-08-16 PROCEDURE — 83735 ASSAY OF MAGNESIUM: CPT | Performed by: PHYSICIAN ASSISTANT

## 2017-08-16 PROCEDURE — 25010000002 PROMETHAZINE PER 50 MG: Performed by: INTERNAL MEDICINE

## 2017-08-16 PROCEDURE — 63710000001 INSULIN DETEMIR PER 5 UNITS: Performed by: INTERNAL MEDICINE

## 2017-08-16 PROCEDURE — 71010 HC CHEST PA OR AP: CPT

## 2017-08-16 PROCEDURE — 99024 POSTOP FOLLOW-UP VISIT: CPT | Performed by: INTERNAL MEDICINE

## 2017-08-16 PROCEDURE — 93010 ELECTROCARDIOGRAM REPORT: CPT | Performed by: INTERNAL MEDICINE

## 2017-08-16 RX ORDER — KETOROLAC TROMETHAMINE 15 MG/ML
15 INJECTION, SOLUTION INTRAMUSCULAR; INTRAVENOUS EVERY 6 HOURS PRN
Status: DISPENSED | OUTPATIENT
Start: 2017-08-16 | End: 2017-08-21

## 2017-08-16 RX ORDER — HYDROMORPHONE HYDROCHLORIDE 1 MG/ML
0.5 INJECTION, SOLUTION INTRAMUSCULAR; INTRAVENOUS; SUBCUTANEOUS
Status: DISPENSED | OUTPATIENT
Start: 2017-08-16 | End: 2017-08-27

## 2017-08-16 RX ORDER — ALBUMIN, HUMAN INJ 5% 5 %
SOLUTION INTRAVENOUS
Status: COMPLETED
Start: 2017-08-16 | End: 2017-08-16

## 2017-08-16 RX ORDER — TOPIRAMATE 100 MG/1
100 TABLET, FILM COATED ORAL NIGHTLY
Status: DISCONTINUED | OUTPATIENT
Start: 2017-08-16 | End: 2017-09-05 | Stop reason: HOSPADM

## 2017-08-16 RX ORDER — PROMETHAZINE HYDROCHLORIDE 25 MG/ML
6.25 INJECTION, SOLUTION INTRAMUSCULAR; INTRAVENOUS EVERY 6 HOURS PRN
Status: DISCONTINUED | OUTPATIENT
Start: 2017-08-16 | End: 2017-08-26

## 2017-08-16 RX ORDER — DEXTROSE MONOHYDRATE 25 G/50ML
25 INJECTION, SOLUTION INTRAVENOUS
Status: DISCONTINUED | OUTPATIENT
Start: 2017-08-16 | End: 2017-08-26

## 2017-08-16 RX ORDER — NICOTINE POLACRILEX 4 MG
15 LOZENGE BUCCAL
Status: DISCONTINUED | OUTPATIENT
Start: 2017-08-16 | End: 2017-08-26

## 2017-08-16 RX ADMIN — TOPIRAMATE 100 MG: 100 TABLET, FILM COATED ORAL at 20:43

## 2017-08-16 RX ADMIN — HYDROMORPHONE HYDROCHLORIDE 1 MG: 1 INJECTION, SOLUTION INTRAMUSCULAR; INTRAVENOUS; SUBCUTANEOUS at 03:43

## 2017-08-16 RX ADMIN — KETOROLAC TROMETHAMINE 15 MG: 15 INJECTION, SOLUTION INTRAMUSCULAR; INTRAVENOUS at 18:20

## 2017-08-16 RX ADMIN — GABAPENTIN 300 MG: 300 CAPSULE ORAL at 20:43

## 2017-08-16 RX ADMIN — CETIRIZINE HYDROCHLORIDE 10 MG: 10 TABLET, FILM COATED ORAL at 09:08

## 2017-08-16 RX ADMIN — CEFUROXIME 1.5 G: 1.5 INJECTION, POWDER, FOR SOLUTION INTRAVENOUS at 03:33

## 2017-08-16 RX ADMIN — KETOROLAC TROMETHAMINE 15 MG: 15 INJECTION, SOLUTION INTRAMUSCULAR; INTRAVENOUS at 10:49

## 2017-08-16 RX ADMIN — GABAPENTIN 300 MG: 300 CAPSULE ORAL at 09:07

## 2017-08-16 RX ADMIN — PANTOPRAZOLE SODIUM 40 MG: 40 TABLET, DELAYED RELEASE ORAL at 06:54

## 2017-08-16 RX ADMIN — HYDROMORPHONE HYDROCHLORIDE 1 MG: 1 INJECTION, SOLUTION INTRAMUSCULAR; INTRAVENOUS; SUBCUTANEOUS at 08:22

## 2017-08-16 RX ADMIN — CYCLOBENZAPRINE HYDROCHLORIDE 10 MG: 10 TABLET, FILM COATED ORAL at 09:08

## 2017-08-16 RX ADMIN — PROMETHAZINE HYDROCHLORIDE 6.25 MG: 25 INJECTION, SOLUTION INTRAMUSCULAR; INTRAVENOUS at 10:49

## 2017-08-16 RX ADMIN — ASPIRIN 325 MG: 325 TABLET, DELAYED RELEASE ORAL at 09:07

## 2017-08-16 RX ADMIN — HYDROMORPHONE HYDROCHLORIDE 1 MG: 1 INJECTION, SOLUTION INTRAMUSCULAR; INTRAVENOUS; SUBCUTANEOUS at 05:22

## 2017-08-16 RX ADMIN — ATORVASTATIN CALCIUM 40 MG: 40 TABLET, FILM COATED ORAL at 20:43

## 2017-08-16 RX ADMIN — CYCLOBENZAPRINE HYDROCHLORIDE 10 MG: 10 TABLET, FILM COATED ORAL at 20:43

## 2017-08-16 RX ADMIN — Medication 2 TABLET: at 09:07

## 2017-08-16 RX ADMIN — INSULIN LISPRO 15 UNITS: 100 INJECTION, SOLUTION INTRAVENOUS; SUBCUTANEOUS at 16:21

## 2017-08-16 RX ADMIN — INSULIN DETEMIR 25 UNITS: 100 INJECTION, SOLUTION SUBCUTANEOUS at 12:12

## 2017-08-16 RX ADMIN — INSULIN DETEMIR 25 UNITS: 100 INJECTION, SOLUTION SUBCUTANEOUS at 20:43

## 2017-08-16 RX ADMIN — HYDROMORPHONE HYDROCHLORIDE 0.5 MG: 1 INJECTION, SOLUTION INTRAMUSCULAR; INTRAVENOUS; SUBCUTANEOUS at 18:21

## 2017-08-16 RX ADMIN — CYCLOBENZAPRINE HYDROCHLORIDE 10 MG: 10 TABLET, FILM COATED ORAL at 16:17

## 2017-08-16 RX ADMIN — Medication 2 TABLET: at 18:09

## 2017-08-16 RX ADMIN — AMLODIPINE BESYLATE 5 MG: 5 TABLET ORAL at 10:50

## 2017-08-16 RX ADMIN — METOPROLOL TARTRATE 2.5 MG: 5 INJECTION INTRAVENOUS at 12:08

## 2017-08-16 RX ADMIN — INSULIN LISPRO 4 UNITS: 100 INJECTION, SOLUTION INTRAVENOUS; SUBCUTANEOUS at 12:13

## 2017-08-16 RX ADMIN — CEFUROXIME 1.5 G: 1.5 INJECTION, POWDER, FOR SOLUTION INTRAVENOUS at 11:23

## 2017-08-16 RX ADMIN — HYDROMORPHONE HYDROCHLORIDE 0.5 MG: 1 INJECTION, SOLUTION INTRAMUSCULAR; INTRAVENOUS; SUBCUTANEOUS at 14:08

## 2017-08-16 RX ADMIN — INSULIN LISPRO 8 UNITS: 100 INJECTION, SOLUTION INTRAVENOUS; SUBCUTANEOUS at 16:20

## 2017-08-16 RX ADMIN — ALBUMIN HUMAN 12.5 G: 0.05 INJECTION, SOLUTION INTRAVENOUS at 04:56

## 2017-08-16 RX ADMIN — FAMOTIDINE 20 MG: 20 TABLET ORAL at 09:08

## 2017-08-16 RX ADMIN — HYDROCODONE BITARTRATE AND ACETAMINOPHEN 1 TABLET: 7.5; 325 TABLET ORAL at 16:18

## 2017-08-16 RX ADMIN — CEFUROXIME 1.5 G: 1.5 INJECTION, POWDER, FOR SOLUTION INTRAVENOUS at 19:29

## 2017-08-16 RX ADMIN — INSULIN LISPRO 12 UNITS: 100 INJECTION, SOLUTION INTRAVENOUS; SUBCUTANEOUS at 20:44

## 2017-08-16 RX ADMIN — INSULIN LISPRO 15 UNITS: 100 INJECTION, SOLUTION INTRAVENOUS; SUBCUTANEOUS at 12:14

## 2017-08-16 RX ADMIN — HYDROCODONE BITARTRATE AND ACETAMINOPHEN 1 TABLET: 7.5; 325 TABLET ORAL at 10:50

## 2017-08-17 ENCOUNTER — APPOINTMENT (OUTPATIENT)
Dept: GENERAL RADIOLOGY | Facility: HOSPITAL | Age: 57
End: 2017-08-17

## 2017-08-17 LAB
ANION GAP SERPL CALCULATED.3IONS-SCNC: 7 MMOL/L (ref 3–11)
BACTERIA SPEC AEROBE CULT: NORMAL
BUN BLD-MCNC: 22 MG/DL (ref 9–23)
BUN/CREAT SERPL: 27.5 (ref 7–25)
CALCIUM SPEC-SCNC: 8.7 MG/DL (ref 8.7–10.4)
CHLORIDE SERPL-SCNC: 100 MMOL/L (ref 99–109)
CO2 SERPL-SCNC: 24 MMOL/L (ref 20–31)
CREAT BLD-MCNC: 0.8 MG/DL (ref 0.6–1.3)
DEPRECATED RDW RBC AUTO: 40.7 FL (ref 37–54)
ERYTHROCYTE [DISTWIDTH] IN BLOOD BY AUTOMATED COUNT: 13.2 % (ref 11.3–14.5)
GFR SERPL CREATININE-BSD FRML MDRD: 100 ML/MIN/1.73
GLUCOSE BLD-MCNC: 282 MG/DL (ref 70–100)
GLUCOSE BLDC GLUCOMTR-MCNC: 267 MG/DL (ref 70–130)
GLUCOSE BLDC GLUCOMTR-MCNC: 293 MG/DL (ref 70–130)
GLUCOSE BLDC GLUCOMTR-MCNC: 311 MG/DL (ref 70–130)
GLUCOSE BLDC GLUCOMTR-MCNC: 321 MG/DL (ref 70–130)
HCT VFR BLD AUTO: 30.1 % (ref 38.9–50.9)
HGB BLD-MCNC: 10 G/DL (ref 13.1–17.5)
MCH RBC QN AUTO: 28.2 PG (ref 27–31)
MCHC RBC AUTO-ENTMCNC: 33.2 G/DL (ref 32–36)
MCV RBC AUTO: 85 FL (ref 80–99)
PLATELET # BLD AUTO: 92 10*3/MM3 (ref 150–450)
PMV BLD AUTO: 11 FL (ref 6–12)
POTASSIUM BLD-SCNC: 4 MMOL/L (ref 3.5–5.5)
RBC # BLD AUTO: 3.54 10*6/MM3 (ref 4.2–5.76)
SODIUM BLD-SCNC: 131 MMOL/L (ref 132–146)
WBC NRBC COR # BLD: 8 10*3/MM3 (ref 3.5–10.8)

## 2017-08-17 PROCEDURE — 85027 COMPLETE CBC AUTOMATED: CPT | Performed by: PHYSICIAN ASSISTANT

## 2017-08-17 PROCEDURE — 93010 ELECTROCARDIOGRAM REPORT: CPT | Performed by: INTERNAL MEDICINE

## 2017-08-17 PROCEDURE — 99024 POSTOP FOLLOW-UP VISIT: CPT | Performed by: PHYSICIAN ASSISTANT

## 2017-08-17 PROCEDURE — 25010000002 HYDROMORPHONE PER 4 MG: Performed by: INTERNAL MEDICINE

## 2017-08-17 PROCEDURE — 99232 SBSQ HOSP IP/OBS MODERATE 35: CPT | Performed by: INTERNAL MEDICINE

## 2017-08-17 PROCEDURE — 71010 HC CHEST PA OR AP: CPT

## 2017-08-17 PROCEDURE — 82962 GLUCOSE BLOOD TEST: CPT

## 2017-08-17 PROCEDURE — 25010000002 PROMETHAZINE PER 50 MG: Performed by: INTERNAL MEDICINE

## 2017-08-17 PROCEDURE — 63710000001 INSULIN LISPRO (HUMAN) PER 5 UNITS: Performed by: INTERNAL MEDICINE

## 2017-08-17 PROCEDURE — 25010000002 KETOROLAC TROMETHAMINE PER 15 MG: Performed by: INTERNAL MEDICINE

## 2017-08-17 PROCEDURE — 93005 ELECTROCARDIOGRAM TRACING: CPT | Performed by: PHYSICIAN ASSISTANT

## 2017-08-17 PROCEDURE — 97110 THERAPEUTIC EXERCISES: CPT

## 2017-08-17 PROCEDURE — 80048 BASIC METABOLIC PNL TOTAL CA: CPT | Performed by: PHYSICIAN ASSISTANT

## 2017-08-17 PROCEDURE — 63710000001 INSULIN DETEMIR PER 5 UNITS: Performed by: INTERNAL MEDICINE

## 2017-08-17 PROCEDURE — 25010000002 CEFUROXIME: Performed by: THORACIC SURGERY (CARDIOTHORACIC VASCULAR SURGERY)

## 2017-08-17 RX ORDER — BUMETANIDE 0.25 MG/ML
2 INJECTION INTRAMUSCULAR; INTRAVENOUS ONCE
Status: COMPLETED | OUTPATIENT
Start: 2017-08-17 | End: 2017-08-17

## 2017-08-17 RX ORDER — SODIUM CHLORIDE 0.9 % (FLUSH) 0.9 %
1-10 SYRINGE (ML) INJECTION EVERY 8 HOURS
Status: DISCONTINUED | OUTPATIENT
Start: 2017-08-17 | End: 2017-09-05 | Stop reason: HOSPADM

## 2017-08-17 RX ADMIN — HYDROMORPHONE HYDROCHLORIDE 0.5 MG: 1 INJECTION, SOLUTION INTRAMUSCULAR; INTRAVENOUS; SUBCUTANEOUS at 12:06

## 2017-08-17 RX ADMIN — INSULIN LISPRO 12 UNITS: 100 INJECTION, SOLUTION INTRAVENOUS; SUBCUTANEOUS at 21:26

## 2017-08-17 RX ADMIN — PROMETHAZINE HYDROCHLORIDE 6.25 MG: 25 INJECTION, SOLUTION INTRAMUSCULAR; INTRAVENOUS at 09:15

## 2017-08-17 RX ADMIN — KETOROLAC TROMETHAMINE 15 MG: 15 INJECTION, SOLUTION INTRAMUSCULAR; INTRAVENOUS at 14:21

## 2017-08-17 RX ADMIN — INSULIN LISPRO 15 UNITS: 100 INJECTION, SOLUTION INTRAVENOUS; SUBCUTANEOUS at 08:05

## 2017-08-17 RX ADMIN — Medication 10 ML: at 09:16

## 2017-08-17 RX ADMIN — PROMETHAZINE HYDROCHLORIDE 6.25 MG: 25 INJECTION, SOLUTION INTRAMUSCULAR; INTRAVENOUS at 16:33

## 2017-08-17 RX ADMIN — HYDROCODONE BITARTRATE AND ACETAMINOPHEN 1 TABLET: 7.5; 325 TABLET ORAL at 19:36

## 2017-08-17 RX ADMIN — INSULIN LISPRO 12 UNITS: 100 INJECTION, SOLUTION INTRAVENOUS; SUBCUTANEOUS at 08:06

## 2017-08-17 RX ADMIN — INSULIN DETEMIR 25 UNITS: 100 INJECTION, SOLUTION SUBCUTANEOUS at 08:05

## 2017-08-17 RX ADMIN — ASPIRIN 325 MG: 325 TABLET, DELAYED RELEASE ORAL at 08:05

## 2017-08-17 RX ADMIN — TOPIRAMATE 100 MG: 100 TABLET, FILM COATED ORAL at 21:26

## 2017-08-17 RX ADMIN — HYDROCODONE BITARTRATE AND ACETAMINOPHEN 1 TABLET: 7.5; 325 TABLET ORAL at 00:02

## 2017-08-17 RX ADMIN — BUMETANIDE 2 MG: 0.25 INJECTION, SOLUTION INTRAMUSCULAR; INTRAVENOUS at 08:39

## 2017-08-17 RX ADMIN — METOPROLOL TARTRATE 12.5 MG: 25 TABLET, FILM COATED ORAL at 21:25

## 2017-08-17 RX ADMIN — INSULIN LISPRO 18 UNITS: 100 INJECTION, SOLUTION INTRAVENOUS; SUBCUTANEOUS at 12:07

## 2017-08-17 RX ADMIN — Medication 10 ML: at 16:36

## 2017-08-17 RX ADMIN — KETOROLAC TROMETHAMINE 15 MG: 15 INJECTION, SOLUTION INTRAMUSCULAR; INTRAVENOUS at 00:10

## 2017-08-17 RX ADMIN — CYCLOBENZAPRINE HYDROCHLORIDE 10 MG: 10 TABLET, FILM COATED ORAL at 21:25

## 2017-08-17 RX ADMIN — INSULIN LISPRO 16 UNITS: 100 INJECTION, SOLUTION INTRAVENOUS; SUBCUTANEOUS at 12:09

## 2017-08-17 RX ADMIN — INSULIN LISPRO 16 UNITS: 100 INJECTION, SOLUTION INTRAVENOUS; SUBCUTANEOUS at 16:43

## 2017-08-17 RX ADMIN — ATORVASTATIN CALCIUM 40 MG: 40 TABLET, FILM COATED ORAL at 21:25

## 2017-08-17 RX ADMIN — GABAPENTIN 300 MG: 300 CAPSULE ORAL at 08:05

## 2017-08-17 RX ADMIN — HYDROCODONE BITARTRATE AND ACETAMINOPHEN 1 TABLET: 7.5; 325 TABLET ORAL at 03:49

## 2017-08-17 RX ADMIN — CETIRIZINE HYDROCHLORIDE 10 MG: 10 TABLET, FILM COATED ORAL at 08:05

## 2017-08-17 RX ADMIN — HYDROCODONE BITARTRATE AND ACETAMINOPHEN 1 TABLET: 7.5; 325 TABLET ORAL at 09:07

## 2017-08-17 RX ADMIN — HYDROCODONE BITARTRATE AND ACETAMINOPHEN 1 TABLET: 7.5; 325 TABLET ORAL at 14:20

## 2017-08-17 RX ADMIN — GABAPENTIN 300 MG: 300 CAPSULE ORAL at 21:25

## 2017-08-17 RX ADMIN — HYDROMORPHONE HYDROCHLORIDE 0.5 MG: 1 INJECTION, SOLUTION INTRAMUSCULAR; INTRAVENOUS; SUBCUTANEOUS at 05:55

## 2017-08-17 RX ADMIN — INSULIN LISPRO 18 UNITS: 100 INJECTION, SOLUTION INTRAVENOUS; SUBCUTANEOUS at 16:43

## 2017-08-17 RX ADMIN — CEFUROXIME 1.5 G: 1.5 INJECTION, POWDER, FOR SOLUTION INTRAVENOUS at 03:49

## 2017-08-17 RX ADMIN — INSULIN DETEMIR 10 UNITS: 100 INJECTION, SOLUTION SUBCUTANEOUS at 12:06

## 2017-08-17 RX ADMIN — Medication 2 TABLET: at 08:05

## 2017-08-17 RX ADMIN — METOPROLOL TARTRATE 12.5 MG: 25 TABLET, FILM COATED ORAL at 08:04

## 2017-08-17 RX ADMIN — PROMETHAZINE HYDROCHLORIDE 6.25 MG: 25 INJECTION, SOLUTION INTRAMUSCULAR; INTRAVENOUS at 00:03

## 2017-08-17 RX ADMIN — CYCLOBENZAPRINE HYDROCHLORIDE 10 MG: 10 TABLET, FILM COATED ORAL at 08:05

## 2017-08-17 RX ADMIN — OXYCODONE AND ACETAMINOPHEN 2 TABLET: 5; 325 TABLET ORAL at 12:06

## 2017-08-17 RX ADMIN — PANTOPRAZOLE SODIUM 40 MG: 40 TABLET, DELAYED RELEASE ORAL at 07:58

## 2017-08-17 RX ADMIN — CYCLOBENZAPRINE HYDROCHLORIDE 10 MG: 10 TABLET, FILM COATED ORAL at 16:33

## 2017-08-17 RX ADMIN — INSULIN DETEMIR 35 UNITS: 100 INJECTION, SOLUTION SUBCUTANEOUS at 21:25

## 2017-08-17 RX ADMIN — Medication 2 TABLET: at 19:36

## 2017-08-18 LAB
ANION GAP SERPL CALCULATED.3IONS-SCNC: 4 MMOL/L (ref 3–11)
BUN BLD-MCNC: 25 MG/DL (ref 9–23)
BUN/CREAT SERPL: 31.3 (ref 7–25)
CALCIUM SPEC-SCNC: 9.1 MG/DL (ref 8.7–10.4)
CHLORIDE SERPL-SCNC: 103 MMOL/L (ref 99–109)
CO2 SERPL-SCNC: 24 MMOL/L (ref 20–31)
CREAT BLD-MCNC: 0.8 MG/DL (ref 0.6–1.3)
DEPRECATED RDW RBC AUTO: 39.1 FL (ref 37–54)
ERYTHROCYTE [DISTWIDTH] IN BLOOD BY AUTOMATED COUNT: 12.9 % (ref 11.3–14.5)
GFR SERPL CREATININE-BSD FRML MDRD: 100 ML/MIN/1.73
GLUCOSE BLD-MCNC: 186 MG/DL (ref 70–100)
GLUCOSE BLDC GLUCOMTR-MCNC: 131 MG/DL (ref 70–130)
GLUCOSE BLDC GLUCOMTR-MCNC: 159 MG/DL (ref 70–130)
GLUCOSE BLDC GLUCOMTR-MCNC: 208 MG/DL (ref 70–130)
GLUCOSE BLDC GLUCOMTR-MCNC: 267 MG/DL (ref 70–130)
HCT VFR BLD AUTO: 28 % (ref 38.9–50.9)
HGB BLD-MCNC: 9.6 G/DL (ref 13.1–17.5)
MCH RBC QN AUTO: 28.1 PG (ref 27–31)
MCHC RBC AUTO-ENTMCNC: 34.3 G/DL (ref 32–36)
MCV RBC AUTO: 81.9 FL (ref 80–99)
PLATELET # BLD AUTO: 117 10*3/MM3 (ref 150–450)
PMV BLD AUTO: 10.4 FL (ref 6–12)
POTASSIUM BLD-SCNC: 3.7 MMOL/L (ref 3.5–5.5)
RBC # BLD AUTO: 3.42 10*6/MM3 (ref 4.2–5.76)
SODIUM BLD-SCNC: 131 MMOL/L (ref 132–146)
WBC NRBC COR # BLD: 7.2 10*3/MM3 (ref 3.5–10.8)

## 2017-08-18 PROCEDURE — 99232 SBSQ HOSP IP/OBS MODERATE 35: CPT | Performed by: INTERNAL MEDICINE

## 2017-08-18 PROCEDURE — 25010000002 PROMETHAZINE PER 50 MG: Performed by: INTERNAL MEDICINE

## 2017-08-18 PROCEDURE — 82962 GLUCOSE BLOOD TEST: CPT

## 2017-08-18 PROCEDURE — 97110 THERAPEUTIC EXERCISES: CPT

## 2017-08-18 PROCEDURE — 63710000001 INSULIN DETEMIR PER 5 UNITS: Performed by: INTERNAL MEDICINE

## 2017-08-18 PROCEDURE — 85027 COMPLETE CBC AUTOMATED: CPT | Performed by: PHYSICIAN ASSISTANT

## 2017-08-18 PROCEDURE — 99232 SBSQ HOSP IP/OBS MODERATE 35: CPT | Performed by: NURSE PRACTITIONER

## 2017-08-18 PROCEDURE — 80048 BASIC METABOLIC PNL TOTAL CA: CPT | Performed by: PHYSICIAN ASSISTANT

## 2017-08-18 RX ORDER — BUMETANIDE 0.25 MG/ML
2 INJECTION INTRAMUSCULAR; INTRAVENOUS ONCE
Status: COMPLETED | OUTPATIENT
Start: 2017-08-18 | End: 2017-08-18

## 2017-08-18 RX ORDER — POTASSIUM CHLORIDE 750 MG/1
20 CAPSULE, EXTENDED RELEASE ORAL ONCE
Status: COMPLETED | OUTPATIENT
Start: 2017-08-18 | End: 2017-08-18

## 2017-08-18 RX ORDER — POLYETHYLENE GLYCOL 3350 17 G/17G
17 POWDER, FOR SOLUTION ORAL DAILY
Status: DISCONTINUED | OUTPATIENT
Start: 2017-08-18 | End: 2017-09-05 | Stop reason: HOSPADM

## 2017-08-18 RX ORDER — BISACODYL 10 MG
10 SUPPOSITORY, RECTAL RECTAL DAILY PRN
Status: DISCONTINUED | OUTPATIENT
Start: 2017-08-18 | End: 2017-08-26

## 2017-08-18 RX ADMIN — AMLODIPINE BESYLATE 5 MG: 5 TABLET ORAL at 09:33

## 2017-08-18 RX ADMIN — METOPROLOL TARTRATE 12.5 MG: 25 TABLET, FILM COATED ORAL at 22:09

## 2017-08-18 RX ADMIN — INSULIN LISPRO 4 UNITS: 100 INJECTION, SOLUTION INTRAVENOUS; SUBCUTANEOUS at 09:34

## 2017-08-18 RX ADMIN — METOPROLOL TARTRATE 12.5 MG: 25 TABLET, FILM COATED ORAL at 09:33

## 2017-08-18 RX ADMIN — INSULIN LISPRO 18 UNITS: 100 INJECTION, SOLUTION INTRAVENOUS; SUBCUTANEOUS at 11:57

## 2017-08-18 RX ADMIN — Medication 2 TABLET: at 18:05

## 2017-08-18 RX ADMIN — ASPIRIN 325 MG: 325 TABLET, DELAYED RELEASE ORAL at 09:33

## 2017-08-18 RX ADMIN — POLYETHYLENE GLYCOL 3350 17 G: 17 POWDER, FOR SOLUTION ORAL at 11:56

## 2017-08-18 RX ADMIN — HYDROCODONE BITARTRATE AND ACETAMINOPHEN 1 TABLET: 7.5; 325 TABLET ORAL at 18:05

## 2017-08-18 RX ADMIN — CYCLOBENZAPRINE HYDROCHLORIDE 10 MG: 10 TABLET, FILM COATED ORAL at 22:09

## 2017-08-18 RX ADMIN — POTASSIUM CHLORIDE 20 MEQ: 750 CAPSULE, EXTENDED RELEASE ORAL at 11:56

## 2017-08-18 RX ADMIN — CETIRIZINE HYDROCHLORIDE 10 MG: 10 TABLET, FILM COATED ORAL at 09:33

## 2017-08-18 RX ADMIN — INSULIN LISPRO 18 UNITS: 100 INJECTION, SOLUTION INTRAVENOUS; SUBCUTANEOUS at 18:04

## 2017-08-18 RX ADMIN — GABAPENTIN 300 MG: 300 CAPSULE ORAL at 22:09

## 2017-08-18 RX ADMIN — Medication 8 ML: at 02:03

## 2017-08-18 RX ADMIN — Medication 10 ML: at 13:13

## 2017-08-18 RX ADMIN — HYDROCODONE BITARTRATE AND ACETAMINOPHEN 1 TABLET: 7.5; 325 TABLET ORAL at 22:09

## 2017-08-18 RX ADMIN — INSULIN LISPRO 18 UNITS: 100 INJECTION, SOLUTION INTRAVENOUS; SUBCUTANEOUS at 09:34

## 2017-08-18 RX ADMIN — PROMETHAZINE HYDROCHLORIDE 6.25 MG: 25 INJECTION, SOLUTION INTRAMUSCULAR; INTRAVENOUS at 04:32

## 2017-08-18 RX ADMIN — PANTOPRAZOLE SODIUM 40 MG: 40 TABLET, DELAYED RELEASE ORAL at 06:04

## 2017-08-18 RX ADMIN — CYCLOBENZAPRINE HYDROCHLORIDE 10 MG: 10 TABLET, FILM COATED ORAL at 15:35

## 2017-08-18 RX ADMIN — INSULIN DETEMIR 35 UNITS: 100 INJECTION, SOLUTION SUBCUTANEOUS at 09:37

## 2017-08-18 RX ADMIN — CYCLOBENZAPRINE HYDROCHLORIDE 10 MG: 10 TABLET, FILM COATED ORAL at 09:33

## 2017-08-18 RX ADMIN — INSULIN LISPRO 12 UNITS: 100 INJECTION, SOLUTION INTRAVENOUS; SUBCUTANEOUS at 11:57

## 2017-08-18 RX ADMIN — Medication 2 TABLET: at 09:33

## 2017-08-18 RX ADMIN — INSULIN LISPRO 8 UNITS: 100 INJECTION, SOLUTION INTRAVENOUS; SUBCUTANEOUS at 18:05

## 2017-08-18 RX ADMIN — HYDROCODONE BITARTRATE AND ACETAMINOPHEN 1 TABLET: 7.5; 325 TABLET ORAL at 06:04

## 2017-08-18 RX ADMIN — HYDROCODONE BITARTRATE AND ACETAMINOPHEN 1 TABLET: 7.5; 325 TABLET ORAL at 02:02

## 2017-08-18 RX ADMIN — HYDROCODONE BITARTRATE AND ACETAMINOPHEN 1 TABLET: 7.5; 325 TABLET ORAL at 09:47

## 2017-08-18 RX ADMIN — INSULIN DETEMIR 35 UNITS: 100 INJECTION, SOLUTION SUBCUTANEOUS at 22:10

## 2017-08-18 RX ADMIN — TOPIRAMATE 100 MG: 100 TABLET, FILM COATED ORAL at 22:10

## 2017-08-18 RX ADMIN — BUMETANIDE 2 MG: 0.25 INJECTION, SOLUTION INTRAMUSCULAR; INTRAVENOUS at 13:12

## 2017-08-18 RX ADMIN — HYDROCODONE BITARTRATE AND ACETAMINOPHEN 1 TABLET: 7.5; 325 TABLET ORAL at 13:12

## 2017-08-18 RX ADMIN — ATORVASTATIN CALCIUM 40 MG: 40 TABLET, FILM COATED ORAL at 22:09

## 2017-08-18 RX ADMIN — BISACODYL 10 MG: 5 TABLET, COATED ORAL at 18:06

## 2017-08-18 RX ADMIN — GABAPENTIN 300 MG: 300 CAPSULE ORAL at 09:33

## 2017-08-19 ENCOUNTER — APPOINTMENT (OUTPATIENT)
Dept: GENERAL RADIOLOGY | Facility: HOSPITAL | Age: 57
End: 2017-08-19

## 2017-08-19 LAB
ANION GAP SERPL CALCULATED.3IONS-SCNC: 10 MMOL/L (ref 3–11)
BUN BLD-MCNC: 20 MG/DL (ref 9–23)
BUN/CREAT SERPL: 33.3 (ref 7–25)
CALCIUM SPEC-SCNC: 9.2 MG/DL (ref 8.7–10.4)
CHLORIDE SERPL-SCNC: 98 MMOL/L (ref 99–109)
CO2 SERPL-SCNC: 27 MMOL/L (ref 20–31)
CREAT BLD-MCNC: 0.6 MG/DL (ref 0.6–1.3)
DEPRECATED RDW RBC AUTO: 39.3 FL (ref 37–54)
ERYTHROCYTE [DISTWIDTH] IN BLOOD BY AUTOMATED COUNT: 13 % (ref 11.3–14.5)
GFR SERPL CREATININE-BSD FRML MDRD: 139 ML/MIN/1.73
GLUCOSE BLD-MCNC: 132 MG/DL (ref 70–100)
GLUCOSE BLDC GLUCOMTR-MCNC: 127 MG/DL (ref 70–130)
GLUCOSE BLDC GLUCOMTR-MCNC: 209 MG/DL (ref 70–130)
GLUCOSE BLDC GLUCOMTR-MCNC: 236 MG/DL (ref 70–130)
GLUCOSE BLDC GLUCOMTR-MCNC: 245 MG/DL (ref 70–130)
HCT VFR BLD AUTO: 30.1 % (ref 38.9–50.9)
HGB BLD-MCNC: 10.1 G/DL (ref 13.1–17.5)
MCH RBC QN AUTO: 27.8 PG (ref 27–31)
MCHC RBC AUTO-ENTMCNC: 33.6 G/DL (ref 32–36)
MCV RBC AUTO: 82.9 FL (ref 80–99)
PLATELET # BLD AUTO: 143 10*3/MM3 (ref 150–450)
PMV BLD AUTO: 10.2 FL (ref 6–12)
POTASSIUM BLD-SCNC: 3.8 MMOL/L (ref 3.5–5.5)
RBC # BLD AUTO: 3.63 10*6/MM3 (ref 4.2–5.76)
SODIUM BLD-SCNC: 135 MMOL/L (ref 132–146)
WBC NRBC COR # BLD: 6.26 10*3/MM3 (ref 3.5–10.8)

## 2017-08-19 PROCEDURE — 99232 SBSQ HOSP IP/OBS MODERATE 35: CPT | Performed by: INTERNAL MEDICINE

## 2017-08-19 PROCEDURE — 82962 GLUCOSE BLOOD TEST: CPT

## 2017-08-19 PROCEDURE — 25010000002 KETOROLAC TROMETHAMINE PER 15 MG: Performed by: INTERNAL MEDICINE

## 2017-08-19 PROCEDURE — 97116 GAIT TRAINING THERAPY: CPT

## 2017-08-19 PROCEDURE — 80048 BASIC METABOLIC PNL TOTAL CA: CPT | Performed by: PHYSICIAN ASSISTANT

## 2017-08-19 PROCEDURE — 71010 HC CHEST PA OR AP: CPT

## 2017-08-19 PROCEDURE — 63710000001 INSULIN DETEMIR PER 5 UNITS: Performed by: INTERNAL MEDICINE

## 2017-08-19 PROCEDURE — 99232 SBSQ HOSP IP/OBS MODERATE 35: CPT | Performed by: HOSPITALIST

## 2017-08-19 PROCEDURE — 85027 COMPLETE CBC AUTOMATED: CPT | Performed by: NURSE PRACTITIONER

## 2017-08-19 RX ORDER — BUMETANIDE 0.25 MG/ML
2 INJECTION INTRAMUSCULAR; INTRAVENOUS ONCE
Status: COMPLETED | OUTPATIENT
Start: 2017-08-19 | End: 2017-08-19

## 2017-08-19 RX ADMIN — AMLODIPINE BESYLATE 5 MG: 5 TABLET ORAL at 09:08

## 2017-08-19 RX ADMIN — INSULIN DETEMIR 35 UNITS: 100 INJECTION, SOLUTION SUBCUTANEOUS at 09:05

## 2017-08-19 RX ADMIN — INSULIN LISPRO 18 UNITS: 100 INJECTION, SOLUTION INTRAVENOUS; SUBCUTANEOUS at 09:12

## 2017-08-19 RX ADMIN — INSULIN DETEMIR 35 UNITS: 100 INJECTION, SOLUTION SUBCUTANEOUS at 21:36

## 2017-08-19 RX ADMIN — CYCLOBENZAPRINE HYDROCHLORIDE 10 MG: 10 TABLET, FILM COATED ORAL at 09:09

## 2017-08-19 RX ADMIN — HYDROCODONE BITARTRATE AND ACETAMINOPHEN 1 TABLET: 7.5; 325 TABLET ORAL at 15:59

## 2017-08-19 RX ADMIN — TOPIRAMATE 100 MG: 100 TABLET, FILM COATED ORAL at 21:36

## 2017-08-19 RX ADMIN — CYCLOBENZAPRINE HYDROCHLORIDE 10 MG: 10 TABLET, FILM COATED ORAL at 21:36

## 2017-08-19 RX ADMIN — ASPIRIN 325 MG: 325 TABLET, DELAYED RELEASE ORAL at 09:09

## 2017-08-19 RX ADMIN — INSULIN LISPRO 8 UNITS: 100 INJECTION, SOLUTION INTRAVENOUS; SUBCUTANEOUS at 21:36

## 2017-08-19 RX ADMIN — CYCLOBENZAPRINE HYDROCHLORIDE 10 MG: 10 TABLET, FILM COATED ORAL at 16:00

## 2017-08-19 RX ADMIN — METOPROLOL TARTRATE 12.5 MG: 25 TABLET, FILM COATED ORAL at 21:35

## 2017-08-19 RX ADMIN — HYDROCODONE BITARTRATE AND ACETAMINOPHEN 1 TABLET: 7.5; 325 TABLET ORAL at 21:35

## 2017-08-19 RX ADMIN — GABAPENTIN 300 MG: 300 CAPSULE ORAL at 09:09

## 2017-08-19 RX ADMIN — BUMETANIDE 2 MG: 0.25 INJECTION INTRAMUSCULAR; INTRAVENOUS at 09:04

## 2017-08-19 RX ADMIN — HYDROCODONE BITARTRATE AND ACETAMINOPHEN 1 TABLET: 7.5; 325 TABLET ORAL at 06:04

## 2017-08-19 RX ADMIN — PANTOPRAZOLE SODIUM 40 MG: 40 TABLET, DELAYED RELEASE ORAL at 06:04

## 2017-08-19 RX ADMIN — KETOROLAC TROMETHAMINE 15 MG: 15 INJECTION, SOLUTION INTRAMUSCULAR; INTRAVENOUS at 09:03

## 2017-08-19 RX ADMIN — HYDROCODONE BITARTRATE AND ACETAMINOPHEN 1 TABLET: 7.5; 325 TABLET ORAL at 11:57

## 2017-08-19 RX ADMIN — BISACODYL 10 MG: 5 TABLET, COATED ORAL at 09:04

## 2017-08-19 RX ADMIN — DOCUSATE SODIUM 100 MG: 100 CAPSULE, LIQUID FILLED ORAL at 09:04

## 2017-08-19 RX ADMIN — INSULIN LISPRO 8 UNITS: 100 INJECTION, SOLUTION INTRAVENOUS; SUBCUTANEOUS at 11:56

## 2017-08-19 RX ADMIN — CETIRIZINE HYDROCHLORIDE 10 MG: 10 TABLET, FILM COATED ORAL at 09:09

## 2017-08-19 RX ADMIN — POLYETHYLENE GLYCOL 3350 17 G: 17 POWDER, FOR SOLUTION ORAL at 09:05

## 2017-08-19 RX ADMIN — INSULIN LISPRO 18 UNITS: 100 INJECTION, SOLUTION INTRAVENOUS; SUBCUTANEOUS at 11:56

## 2017-08-19 RX ADMIN — Medication 2 TABLET: at 09:09

## 2017-08-19 RX ADMIN — ATORVASTATIN CALCIUM 40 MG: 40 TABLET, FILM COATED ORAL at 21:35

## 2017-08-19 RX ADMIN — GABAPENTIN 300 MG: 300 CAPSULE ORAL at 21:35

## 2017-08-19 RX ADMIN — INSULIN LISPRO 18 UNITS: 100 INJECTION, SOLUTION INTRAVENOUS; SUBCUTANEOUS at 17:49

## 2017-08-19 RX ADMIN — INSULIN LISPRO 8 UNITS: 100 INJECTION, SOLUTION INTRAVENOUS; SUBCUTANEOUS at 17:49

## 2017-08-19 RX ADMIN — Medication 10 ML: at 09:17

## 2017-08-19 RX ADMIN — METOPROLOL TARTRATE 12.5 MG: 25 TABLET, FILM COATED ORAL at 09:08

## 2017-08-19 RX ADMIN — BISACODYL 10 MG: 10 SUPPOSITORY RECTAL at 16:00

## 2017-08-20 ENCOUNTER — APPOINTMENT (OUTPATIENT)
Dept: GENERAL RADIOLOGY | Facility: HOSPITAL | Age: 57
End: 2017-08-20

## 2017-08-20 LAB
ANION GAP SERPL CALCULATED.3IONS-SCNC: 13 MMOL/L (ref 3–11)
BASOPHILS # BLD AUTO: 0.04 10*3/MM3 (ref 0–0.2)
BASOPHILS NFR BLD AUTO: 0.8 % (ref 0–1)
BUN BLD-MCNC: 19 MG/DL (ref 9–23)
BUN/CREAT SERPL: 27.1 (ref 7–25)
CALCIUM SPEC-SCNC: 9.3 MG/DL (ref 8.7–10.4)
CHLORIDE SERPL-SCNC: 99 MMOL/L (ref 99–109)
CO2 SERPL-SCNC: 23 MMOL/L (ref 20–31)
CREAT BLD-MCNC: 0.7 MG/DL (ref 0.6–1.3)
DEPRECATED RDW RBC AUTO: 38.6 FL (ref 37–54)
EOSINOPHIL # BLD AUTO: 0.32 10*3/MM3 (ref 0–0.3)
EOSINOPHIL NFR BLD AUTO: 6.3 % (ref 0–3)
ERYTHROCYTE [DISTWIDTH] IN BLOOD BY AUTOMATED COUNT: 12.7 % (ref 11.3–14.5)
GFR SERPL CREATININE-BSD FRML MDRD: 117 ML/MIN/1.73
GLUCOSE BLD-MCNC: 151 MG/DL (ref 70–100)
GLUCOSE BLDC GLUCOMTR-MCNC: 140 MG/DL (ref 70–130)
GLUCOSE BLDC GLUCOMTR-MCNC: 146 MG/DL (ref 70–130)
GLUCOSE BLDC GLUCOMTR-MCNC: 202 MG/DL (ref 70–130)
GLUCOSE BLDC GLUCOMTR-MCNC: 304 MG/DL (ref 70–130)
HCT VFR BLD AUTO: 29.6 % (ref 38.9–50.9)
HGB BLD-MCNC: 10 G/DL (ref 13.1–17.5)
IMM GRANULOCYTES # BLD: 0.08 10*3/MM3 (ref 0–0.03)
IMM GRANULOCYTES NFR BLD: 1.6 % (ref 0–0.6)
LYMPHOCYTES # BLD AUTO: 1.49 10*3/MM3 (ref 0.6–4.8)
LYMPHOCYTES NFR BLD AUTO: 29.4 % (ref 24–44)
MCH RBC QN AUTO: 27.5 PG (ref 27–31)
MCHC RBC AUTO-ENTMCNC: 33.8 G/DL (ref 32–36)
MCV RBC AUTO: 81.5 FL (ref 80–99)
MONOCYTES # BLD AUTO: 0.57 10*3/MM3 (ref 0–1)
MONOCYTES NFR BLD AUTO: 11.2 % (ref 0–12)
NEUTROPHILS # BLD AUTO: 2.57 10*3/MM3 (ref 1.5–8.3)
NEUTROPHILS NFR BLD AUTO: 50.7 % (ref 41–71)
PLATELET # BLD AUTO: 170 10*3/MM3 (ref 150–450)
PMV BLD AUTO: 9.8 FL (ref 6–12)
POTASSIUM BLD-SCNC: 3.8 MMOL/L (ref 3.5–5.5)
RBC # BLD AUTO: 3.63 10*6/MM3 (ref 4.2–5.76)
SODIUM BLD-SCNC: 135 MMOL/L (ref 132–146)
WBC NRBC COR # BLD: 5.07 10*3/MM3 (ref 3.5–10.8)

## 2017-08-20 PROCEDURE — 99231 SBSQ HOSP IP/OBS SF/LOW 25: CPT | Performed by: HOSPITALIST

## 2017-08-20 PROCEDURE — 80048 BASIC METABOLIC PNL TOTAL CA: CPT | Performed by: HOSPITALIST

## 2017-08-20 PROCEDURE — 71010 HC CHEST PA OR AP: CPT

## 2017-08-20 PROCEDURE — 85025 COMPLETE CBC W/AUTO DIFF WBC: CPT | Performed by: HOSPITALIST

## 2017-08-20 PROCEDURE — 99232 SBSQ HOSP IP/OBS MODERATE 35: CPT | Performed by: INTERNAL MEDICINE

## 2017-08-20 PROCEDURE — 63710000001 INSULIN DETEMIR PER 5 UNITS: Performed by: HOSPITALIST

## 2017-08-20 PROCEDURE — 25010000002 PROMETHAZINE PER 50 MG: Performed by: INTERNAL MEDICINE

## 2017-08-20 PROCEDURE — 82962 GLUCOSE BLOOD TEST: CPT

## 2017-08-20 PROCEDURE — 25010000002 KETOROLAC TROMETHAMINE PER 15 MG: Performed by: INTERNAL MEDICINE

## 2017-08-20 PROCEDURE — 63710000001 INSULIN DETEMIR PER 5 UNITS: Performed by: INTERNAL MEDICINE

## 2017-08-20 RX ADMIN — INSULIN LISPRO 4 UNITS: 100 INJECTION, SOLUTION INTRAVENOUS; SUBCUTANEOUS at 20:59

## 2017-08-20 RX ADMIN — INSULIN DETEMIR 40 UNITS: 100 INJECTION, SOLUTION SUBCUTANEOUS at 20:59

## 2017-08-20 RX ADMIN — INSULIN DETEMIR 35 UNITS: 100 INJECTION, SOLUTION SUBCUTANEOUS at 09:32

## 2017-08-20 RX ADMIN — PANTOPRAZOLE SODIUM 40 MG: 40 TABLET, DELAYED RELEASE ORAL at 06:29

## 2017-08-20 RX ADMIN — CYCLOBENZAPRINE HYDROCHLORIDE 10 MG: 10 TABLET, FILM COATED ORAL at 20:46

## 2017-08-20 RX ADMIN — PROMETHAZINE HYDROCHLORIDE 6.25 MG: 25 INJECTION, SOLUTION INTRAMUSCULAR; INTRAVENOUS at 22:34

## 2017-08-20 RX ADMIN — Medication 10 ML: at 11:46

## 2017-08-20 RX ADMIN — HYDROCODONE BITARTRATE AND ACETAMINOPHEN 1 TABLET: 7.5; 325 TABLET ORAL at 06:29

## 2017-08-20 RX ADMIN — INSULIN LISPRO 8 UNITS: 100 INJECTION, SOLUTION INTRAVENOUS; SUBCUTANEOUS at 17:18

## 2017-08-20 RX ADMIN — METOPROLOL TARTRATE 12.5 MG: 25 TABLET, FILM COATED ORAL at 09:24

## 2017-08-20 RX ADMIN — AMLODIPINE BESYLATE 5 MG: 5 TABLET ORAL at 09:24

## 2017-08-20 RX ADMIN — ASPIRIN 325 MG: 325 TABLET, DELAYED RELEASE ORAL at 09:24

## 2017-08-20 RX ADMIN — OXYCODONE AND ACETAMINOPHEN 2 TABLET: 5; 325 TABLET ORAL at 09:35

## 2017-08-20 RX ADMIN — Medication 2 TABLET: at 17:17

## 2017-08-20 RX ADMIN — INSULIN LISPRO 18 UNITS: 100 INJECTION, SOLUTION INTRAVENOUS; SUBCUTANEOUS at 09:32

## 2017-08-20 RX ADMIN — GABAPENTIN 300 MG: 300 CAPSULE ORAL at 20:46

## 2017-08-20 RX ADMIN — INSULIN LISPRO 16 UNITS: 100 INJECTION, SOLUTION INTRAVENOUS; SUBCUTANEOUS at 11:44

## 2017-08-20 RX ADMIN — GABAPENTIN 300 MG: 300 CAPSULE ORAL at 09:24

## 2017-08-20 RX ADMIN — Medication 2 TABLET: at 09:24

## 2017-08-20 RX ADMIN — POLYETHYLENE GLYCOL 3350 17 G: 17 POWDER, FOR SOLUTION ORAL at 09:23

## 2017-08-20 RX ADMIN — METOPROLOL TARTRATE 12.5 MG: 25 TABLET, FILM COATED ORAL at 20:46

## 2017-08-20 RX ADMIN — CYCLOBENZAPRINE HYDROCHLORIDE 10 MG: 10 TABLET, FILM COATED ORAL at 17:17

## 2017-08-20 RX ADMIN — HYDROCODONE BITARTRATE AND ACETAMINOPHEN 1 TABLET: 7.5; 325 TABLET ORAL at 14:05

## 2017-08-20 RX ADMIN — CETIRIZINE HYDROCHLORIDE 10 MG: 10 TABLET, FILM COATED ORAL at 09:24

## 2017-08-20 RX ADMIN — HYDROCODONE BITARTRATE AND ACETAMINOPHEN 1 TABLET: 7.5; 325 TABLET ORAL at 22:33

## 2017-08-20 RX ADMIN — CYCLOBENZAPRINE HYDROCHLORIDE 10 MG: 10 TABLET, FILM COATED ORAL at 09:24

## 2017-08-20 RX ADMIN — TOPIRAMATE 100 MG: 100 TABLET, FILM COATED ORAL at 20:47

## 2017-08-20 RX ADMIN — INSULIN LISPRO 18 UNITS: 100 INJECTION, SOLUTION INTRAVENOUS; SUBCUTANEOUS at 11:43

## 2017-08-20 RX ADMIN — ATORVASTATIN CALCIUM 40 MG: 40 TABLET, FILM COATED ORAL at 20:46

## 2017-08-20 RX ADMIN — OXYCODONE AND ACETAMINOPHEN 2 TABLET: 5; 325 TABLET ORAL at 17:18

## 2017-08-20 RX ADMIN — KETOROLAC TROMETHAMINE 15 MG: 15 INJECTION, SOLUTION INTRAMUSCULAR; INTRAVENOUS at 20:59

## 2017-08-21 ENCOUNTER — APPOINTMENT (OUTPATIENT)
Dept: GENERAL RADIOLOGY | Facility: HOSPITAL | Age: 57
End: 2017-08-21

## 2017-08-21 LAB
GLUCOSE BLDC GLUCOMTR-MCNC: 181 MG/DL (ref 70–130)
GLUCOSE BLDC GLUCOMTR-MCNC: 182 MG/DL (ref 70–130)
GLUCOSE BLDC GLUCOMTR-MCNC: 188 MG/DL (ref 70–130)
GLUCOSE BLDC GLUCOMTR-MCNC: 225 MG/DL (ref 70–130)

## 2017-08-21 PROCEDURE — 97116 GAIT TRAINING THERAPY: CPT

## 2017-08-21 PROCEDURE — 99024 POSTOP FOLLOW-UP VISIT: CPT | Performed by: THORACIC SURGERY (CARDIOTHORACIC VASCULAR SURGERY)

## 2017-08-21 PROCEDURE — 25010000002 PROMETHAZINE PER 50 MG: Performed by: INTERNAL MEDICINE

## 2017-08-21 PROCEDURE — 99232 SBSQ HOSP IP/OBS MODERATE 35: CPT | Performed by: INTERNAL MEDICINE

## 2017-08-21 PROCEDURE — 25010000002 HYDROMORPHONE PER 4 MG: Performed by: INTERNAL MEDICINE

## 2017-08-21 PROCEDURE — 97110 THERAPEUTIC EXERCISES: CPT

## 2017-08-21 PROCEDURE — 82962 GLUCOSE BLOOD TEST: CPT

## 2017-08-21 PROCEDURE — 63710000001 INSULIN LISPRO (HUMAN) PER 5 UNITS: Performed by: INTERNAL MEDICINE

## 2017-08-21 PROCEDURE — 71010 HC CHEST PA OR AP: CPT

## 2017-08-21 PROCEDURE — 99232 SBSQ HOSP IP/OBS MODERATE 35: CPT | Performed by: PHYSICIAN ASSISTANT

## 2017-08-21 PROCEDURE — 63710000001 INSULIN DETEMIR PER 5 UNITS: Performed by: HOSPITALIST

## 2017-08-21 RX ORDER — HYDROCODONE BITARTRATE AND ACETAMINOPHEN 7.5; 325 MG/1; MG/1
1 TABLET ORAL EVERY 4 HOURS PRN
Qty: 30 TABLET | Refills: 0 | Status: SHIPPED | OUTPATIENT
Start: 2017-08-21 | End: 2017-09-05

## 2017-08-21 RX ORDER — GUAIFENESIN 600 MG/1
600 TABLET, EXTENDED RELEASE ORAL EVERY 12 HOURS SCHEDULED
Status: DISCONTINUED | OUTPATIENT
Start: 2017-08-21 | End: 2017-08-26

## 2017-08-21 RX ADMIN — HYDROCODONE BITARTRATE AND ACETAMINOPHEN 1 TABLET: 7.5; 325 TABLET ORAL at 21:57

## 2017-08-21 RX ADMIN — INSULIN DETEMIR 40 UNITS: 100 INJECTION, SOLUTION SUBCUTANEOUS at 08:16

## 2017-08-21 RX ADMIN — GABAPENTIN 300 MG: 300 CAPSULE ORAL at 19:47

## 2017-08-21 RX ADMIN — GUAIFENESIN 600 MG: 600 TABLET, EXTENDED RELEASE ORAL at 12:24

## 2017-08-21 RX ADMIN — GABAPENTIN 300 MG: 300 CAPSULE ORAL at 08:15

## 2017-08-21 RX ADMIN — METOPROLOL TARTRATE 12.5 MG: 25 TABLET, FILM COATED ORAL at 08:15

## 2017-08-21 RX ADMIN — METOPROLOL TARTRATE 12.5 MG: 25 TABLET, FILM COATED ORAL at 21:53

## 2017-08-21 RX ADMIN — INSULIN LISPRO 4 UNITS: 100 INJECTION, SOLUTION INTRAVENOUS; SUBCUTANEOUS at 21:48

## 2017-08-21 RX ADMIN — PROMETHAZINE HYDROCHLORIDE 6.25 MG: 25 INJECTION, SOLUTION INTRAMUSCULAR; INTRAVENOUS at 21:57

## 2017-08-21 RX ADMIN — ASPIRIN 325 MG: 325 TABLET, DELAYED RELEASE ORAL at 08:16

## 2017-08-21 RX ADMIN — INSULIN LISPRO 8 UNITS: 100 INJECTION, SOLUTION INTRAVENOUS; SUBCUTANEOUS at 12:24

## 2017-08-21 RX ADMIN — CYCLOBENZAPRINE HYDROCHLORIDE 10 MG: 10 TABLET, FILM COATED ORAL at 08:16

## 2017-08-21 RX ADMIN — HYDROMORPHONE HYDROCHLORIDE 0.5 MG: 1 INJECTION, SOLUTION INTRAMUSCULAR; INTRAVENOUS; SUBCUTANEOUS at 12:26

## 2017-08-21 RX ADMIN — GUAIFENESIN 600 MG: 600 TABLET, EXTENDED RELEASE ORAL at 19:47

## 2017-08-21 RX ADMIN — CYCLOBENZAPRINE HYDROCHLORIDE 10 MG: 10 TABLET, FILM COATED ORAL at 19:47

## 2017-08-21 RX ADMIN — AMLODIPINE BESYLATE 5 MG: 5 TABLET ORAL at 08:15

## 2017-08-21 RX ADMIN — INSULIN DETEMIR 40 UNITS: 100 INJECTION, SOLUTION SUBCUTANEOUS at 21:48

## 2017-08-21 RX ADMIN — HYDROCODONE BITARTRATE AND ACETAMINOPHEN 1 TABLET: 7.5; 325 TABLET ORAL at 05:39

## 2017-08-21 RX ADMIN — Medication 10 ML: at 19:57

## 2017-08-21 RX ADMIN — Medication 10 ML: at 08:20

## 2017-08-21 RX ADMIN — INSULIN LISPRO 4 UNITS: 100 INJECTION, SOLUTION INTRAVENOUS; SUBCUTANEOUS at 17:42

## 2017-08-21 RX ADMIN — Medication 2 TABLET: at 08:16

## 2017-08-21 RX ADMIN — Medication 2 TABLET: at 17:41

## 2017-08-21 RX ADMIN — HYDROMORPHONE HYDROCHLORIDE 0.5 MG: 1 INJECTION, SOLUTION INTRAMUSCULAR; INTRAVENOUS; SUBCUTANEOUS at 08:29

## 2017-08-21 RX ADMIN — INSULIN LISPRO 4 UNITS: 100 INJECTION, SOLUTION INTRAVENOUS; SUBCUTANEOUS at 08:19

## 2017-08-21 RX ADMIN — PROMETHAZINE HYDROCHLORIDE 6.25 MG: 25 INJECTION, SOLUTION INTRAMUSCULAR; INTRAVENOUS at 05:39

## 2017-08-21 RX ADMIN — TOPIRAMATE 100 MG: 100 TABLET, FILM COATED ORAL at 19:47

## 2017-08-21 RX ADMIN — ATORVASTATIN CALCIUM 40 MG: 40 TABLET, FILM COATED ORAL at 19:47

## 2017-08-21 RX ADMIN — HYDROMORPHONE HYDROCHLORIDE 0.5 MG: 1 INJECTION, SOLUTION INTRAMUSCULAR; INTRAVENOUS; SUBCUTANEOUS at 19:47

## 2017-08-21 RX ADMIN — PANTOPRAZOLE SODIUM 40 MG: 40 TABLET, DELAYED RELEASE ORAL at 05:39

## 2017-08-21 RX ADMIN — CYCLOBENZAPRINE HYDROCHLORIDE 10 MG: 10 TABLET, FILM COATED ORAL at 17:41

## 2017-08-21 RX ADMIN — POLYETHYLENE GLYCOL 3350 17 G: 17 POWDER, FOR SOLUTION ORAL at 08:16

## 2017-08-21 RX ADMIN — CETIRIZINE HYDROCHLORIDE 10 MG: 10 TABLET, FILM COATED ORAL at 08:16

## 2017-08-21 RX ADMIN — Medication 10 ML: at 01:45

## 2017-08-21 RX ADMIN — HYDROCODONE BITARTRATE AND ACETAMINOPHEN 1 TABLET: 7.5; 325 TABLET ORAL at 17:42

## 2017-08-22 ENCOUNTER — APPOINTMENT (OUTPATIENT)
Dept: GENERAL RADIOLOGY | Facility: HOSPITAL | Age: 57
End: 2017-08-22

## 2017-08-22 LAB
GLUCOSE BLDC GLUCOMTR-MCNC: 109 MG/DL (ref 70–130)
GLUCOSE BLDC GLUCOMTR-MCNC: 138 MG/DL (ref 70–130)
GLUCOSE BLDC GLUCOMTR-MCNC: 164 MG/DL (ref 70–130)
GLUCOSE BLDC GLUCOMTR-MCNC: 182 MG/DL (ref 70–130)

## 2017-08-22 PROCEDURE — 25010000002 FUROSEMIDE PER 20 MG: Performed by: INTERNAL MEDICINE

## 2017-08-22 PROCEDURE — 82962 GLUCOSE BLOOD TEST: CPT

## 2017-08-22 PROCEDURE — 99232 SBSQ HOSP IP/OBS MODERATE 35: CPT | Performed by: NURSE PRACTITIONER

## 2017-08-22 PROCEDURE — 25010000002 HYDROMORPHONE PER 4 MG: Performed by: INTERNAL MEDICINE

## 2017-08-22 PROCEDURE — 63710000001 INSULIN DETEMIR PER 5 UNITS: Performed by: HOSPITALIST

## 2017-08-22 PROCEDURE — 99024 POSTOP FOLLOW-UP VISIT: CPT | Performed by: THORACIC SURGERY (CARDIOTHORACIC VASCULAR SURGERY)

## 2017-08-22 PROCEDURE — 99232 SBSQ HOSP IP/OBS MODERATE 35: CPT | Performed by: INTERNAL MEDICINE

## 2017-08-22 PROCEDURE — 25010000002 PROMETHAZINE PER 50 MG: Performed by: INTERNAL MEDICINE

## 2017-08-22 PROCEDURE — 71010 HC CHEST PA OR AP: CPT

## 2017-08-22 RX ORDER — FUROSEMIDE 10 MG/ML
40 INJECTION INTRAMUSCULAR; INTRAVENOUS ONCE
Status: COMPLETED | OUTPATIENT
Start: 2017-08-22 | End: 2017-08-22

## 2017-08-22 RX ORDER — INDOMETHACIN 75 MG/1
75 CAPSULE, EXTENDED RELEASE ORAL 2 TIMES DAILY WITH MEALS
Status: DISCONTINUED | OUTPATIENT
Start: 2017-08-22 | End: 2017-08-25

## 2017-08-22 RX ADMIN — Medication 2 TABLET: at 18:21

## 2017-08-22 RX ADMIN — POLYETHYLENE GLYCOL 3350 17 G: 17 POWDER, FOR SOLUTION ORAL at 09:06

## 2017-08-22 RX ADMIN — GABAPENTIN 300 MG: 300 CAPSULE ORAL at 21:50

## 2017-08-22 RX ADMIN — METOPROLOL TARTRATE 12.5 MG: 25 TABLET, FILM COATED ORAL at 21:49

## 2017-08-22 RX ADMIN — PANTOPRAZOLE SODIUM 40 MG: 40 TABLET, DELAYED RELEASE ORAL at 09:06

## 2017-08-22 RX ADMIN — GABAPENTIN 300 MG: 300 CAPSULE ORAL at 09:06

## 2017-08-22 RX ADMIN — INSULIN DETEMIR 40 UNITS: 100 INJECTION, SOLUTION SUBCUTANEOUS at 09:13

## 2017-08-22 RX ADMIN — INDOMETHACIN 75 MG: 75 CAPSULE, EXTENDED RELEASE ORAL at 09:13

## 2017-08-22 RX ADMIN — ATORVASTATIN CALCIUM 40 MG: 40 TABLET, FILM COATED ORAL at 21:49

## 2017-08-22 RX ADMIN — GUAIFENESIN 600 MG: 600 TABLET, EXTENDED RELEASE ORAL at 21:49

## 2017-08-22 RX ADMIN — HYDROCODONE BITARTRATE AND ACETAMINOPHEN 1 TABLET: 7.5; 325 TABLET ORAL at 16:33

## 2017-08-22 RX ADMIN — HYDROCODONE BITARTRATE AND ACETAMINOPHEN 1 TABLET: 7.5; 325 TABLET ORAL at 04:45

## 2017-08-22 RX ADMIN — HYDROMORPHONE HYDROCHLORIDE 0.5 MG: 1 INJECTION, SOLUTION INTRAMUSCULAR; INTRAVENOUS; SUBCUTANEOUS at 07:12

## 2017-08-22 RX ADMIN — AMLODIPINE BESYLATE 5 MG: 5 TABLET ORAL at 09:06

## 2017-08-22 RX ADMIN — HYDROCODONE BITARTRATE AND ACETAMINOPHEN 1 TABLET: 7.5; 325 TABLET ORAL at 21:49

## 2017-08-22 RX ADMIN — PANTOPRAZOLE SODIUM 40 MG: 40 TABLET, DELAYED RELEASE ORAL at 04:44

## 2017-08-22 RX ADMIN — CYCLOBENZAPRINE HYDROCHLORIDE 10 MG: 10 TABLET, FILM COATED ORAL at 16:33

## 2017-08-22 RX ADMIN — Medication 2 TABLET: at 09:06

## 2017-08-22 RX ADMIN — INSULIN DETEMIR 40 UNITS: 100 INJECTION, SOLUTION SUBCUTANEOUS at 21:50

## 2017-08-22 RX ADMIN — INSULIN LISPRO 4 UNITS: 100 INJECTION, SOLUTION INTRAVENOUS; SUBCUTANEOUS at 16:34

## 2017-08-22 RX ADMIN — HYDROCODONE BITARTRATE AND ACETAMINOPHEN 1 TABLET: 7.5; 325 TABLET ORAL at 11:29

## 2017-08-22 RX ADMIN — METOPROLOL TARTRATE 12.5 MG: 25 TABLET, FILM COATED ORAL at 09:06

## 2017-08-22 RX ADMIN — CYCLOBENZAPRINE HYDROCHLORIDE 10 MG: 10 TABLET, FILM COATED ORAL at 09:06

## 2017-08-22 RX ADMIN — TOPIRAMATE 100 MG: 100 TABLET, FILM COATED ORAL at 21:50

## 2017-08-22 RX ADMIN — CYCLOBENZAPRINE HYDROCHLORIDE 10 MG: 10 TABLET, FILM COATED ORAL at 21:49

## 2017-08-22 RX ADMIN — INDOMETHACIN 75 MG: 75 CAPSULE, EXTENDED RELEASE ORAL at 18:23

## 2017-08-22 RX ADMIN — FUROSEMIDE 40 MG: 10 INJECTION, SOLUTION INTRAMUSCULAR; INTRAVENOUS at 09:06

## 2017-08-22 RX ADMIN — CETIRIZINE HYDROCHLORIDE 10 MG: 10 TABLET, FILM COATED ORAL at 09:06

## 2017-08-22 RX ADMIN — ASPIRIN 325 MG: 325 TABLET, DELAYED RELEASE ORAL at 09:06

## 2017-08-22 RX ADMIN — PROMETHAZINE HYDROCHLORIDE 6.25 MG: 25 INJECTION, SOLUTION INTRAMUSCULAR; INTRAVENOUS at 04:45

## 2017-08-22 RX ADMIN — INSULIN LISPRO 4 UNITS: 100 INJECTION, SOLUTION INTRAVENOUS; SUBCUTANEOUS at 21:55

## 2017-08-22 RX ADMIN — GUAIFENESIN 600 MG: 600 TABLET, EXTENDED RELEASE ORAL at 09:06

## 2017-08-22 RX ADMIN — Medication 10 ML: at 18:27

## 2017-08-23 ENCOUNTER — APPOINTMENT (OUTPATIENT)
Dept: GENERAL RADIOLOGY | Facility: HOSPITAL | Age: 57
End: 2017-08-23

## 2017-08-23 LAB
ANION GAP SERPL CALCULATED.3IONS-SCNC: 7 MMOL/L (ref 3–11)
BASOPHILS # BLD AUTO: 0.02 10*3/MM3 (ref 0–0.2)
BASOPHILS NFR BLD AUTO: 0.3 % (ref 0–1)
BUN BLD-MCNC: 15 MG/DL (ref 9–23)
BUN/CREAT SERPL: 18.8 (ref 7–25)
CALCIUM SPEC-SCNC: 9.2 MG/DL (ref 8.7–10.4)
CHLORIDE SERPL-SCNC: 99 MMOL/L (ref 99–109)
CO2 SERPL-SCNC: 28 MMOL/L (ref 20–31)
CREAT BLD-MCNC: 0.8 MG/DL (ref 0.6–1.3)
DEPRECATED RDW RBC AUTO: 39 FL (ref 37–54)
EOSINOPHIL # BLD AUTO: 0.23 10*3/MM3 (ref 0–0.3)
EOSINOPHIL NFR BLD AUTO: 3.4 % (ref 0–3)
ERYTHROCYTE [DISTWIDTH] IN BLOOD BY AUTOMATED COUNT: 12.8 % (ref 11.3–14.5)
GFR SERPL CREATININE-BSD FRML MDRD: 100 ML/MIN/1.73
GLUCOSE BLD-MCNC: 129 MG/DL (ref 70–100)
GLUCOSE BLDC GLUCOMTR-MCNC: 124 MG/DL (ref 70–130)
GLUCOSE BLDC GLUCOMTR-MCNC: 156 MG/DL (ref 70–130)
GLUCOSE BLDC GLUCOMTR-MCNC: 167 MG/DL (ref 70–130)
GLUCOSE BLDC GLUCOMTR-MCNC: 183 MG/DL (ref 70–130)
HCT VFR BLD AUTO: 29.2 % (ref 38.9–50.9)
HGB BLD-MCNC: 9.6 G/DL (ref 13.1–17.5)
IMM GRANULOCYTES # BLD: 0.2 10*3/MM3 (ref 0–0.03)
IMM GRANULOCYTES NFR BLD: 3 % (ref 0–0.6)
LYMPHOCYTES # BLD AUTO: 1.73 10*3/MM3 (ref 0.6–4.8)
LYMPHOCYTES NFR BLD AUTO: 25.8 % (ref 24–44)
MCH RBC QN AUTO: 27 PG (ref 27–31)
MCHC RBC AUTO-ENTMCNC: 32.9 G/DL (ref 32–36)
MCV RBC AUTO: 82.3 FL (ref 80–99)
MONOCYTES # BLD AUTO: 0.62 10*3/MM3 (ref 0–1)
MONOCYTES NFR BLD AUTO: 9.3 % (ref 0–12)
NEUTROPHILS # BLD AUTO: 3.9 10*3/MM3 (ref 1.5–8.3)
NEUTROPHILS NFR BLD AUTO: 58.2 % (ref 41–71)
PLATELET # BLD AUTO: 209 10*3/MM3 (ref 150–450)
PMV BLD AUTO: 9.2 FL (ref 6–12)
POTASSIUM BLD-SCNC: 3.8 MMOL/L (ref 3.5–5.5)
RBC # BLD AUTO: 3.55 10*6/MM3 (ref 4.2–5.76)
SODIUM BLD-SCNC: 134 MMOL/L (ref 132–146)
WBC NRBC COR # BLD: 6.7 10*3/MM3 (ref 3.5–10.8)

## 2017-08-23 PROCEDURE — 71010 HC CHEST PA OR AP: CPT

## 2017-08-23 PROCEDURE — 63710000001 INSULIN DETEMIR PER 5 UNITS: Performed by: HOSPITALIST

## 2017-08-23 PROCEDURE — 85025 COMPLETE CBC W/AUTO DIFF WBC: CPT | Performed by: NURSE PRACTITIONER

## 2017-08-23 PROCEDURE — 94799 UNLISTED PULMONARY SVC/PX: CPT

## 2017-08-23 PROCEDURE — 99232 SBSQ HOSP IP/OBS MODERATE 35: CPT | Performed by: NURSE PRACTITIONER

## 2017-08-23 PROCEDURE — 82962 GLUCOSE BLOOD TEST: CPT

## 2017-08-23 PROCEDURE — 97110 THERAPEUTIC EXERCISES: CPT

## 2017-08-23 PROCEDURE — 99024 POSTOP FOLLOW-UP VISIT: CPT | Performed by: THORACIC SURGERY (CARDIOTHORACIC VASCULAR SURGERY)

## 2017-08-23 PROCEDURE — 63710000001 INSULIN LISPRO (HUMAN) PER 5 UNITS: Performed by: INTERNAL MEDICINE

## 2017-08-23 PROCEDURE — 80048 BASIC METABOLIC PNL TOTAL CA: CPT | Performed by: NURSE PRACTITIONER

## 2017-08-23 RX ADMIN — INSULIN LISPRO 4 UNITS: 100 INJECTION, SOLUTION INTRAVENOUS; SUBCUTANEOUS at 12:47

## 2017-08-23 RX ADMIN — POLYETHYLENE GLYCOL 3350 17 G: 17 POWDER, FOR SOLUTION ORAL at 09:19

## 2017-08-23 RX ADMIN — INSULIN LISPRO 4 UNITS: 100 INJECTION, SOLUTION INTRAVENOUS; SUBCUTANEOUS at 22:20

## 2017-08-23 RX ADMIN — HYDROCODONE BITARTRATE AND ACETAMINOPHEN 1 TABLET: 7.5; 325 TABLET ORAL at 17:25

## 2017-08-23 RX ADMIN — INDOMETHACIN 75 MG: 75 CAPSULE, EXTENDED RELEASE ORAL at 09:20

## 2017-08-23 RX ADMIN — HYDROCODONE BITARTRATE AND ACETAMINOPHEN 1 TABLET: 7.5; 325 TABLET ORAL at 21:41

## 2017-08-23 RX ADMIN — GABAPENTIN 300 MG: 300 CAPSULE ORAL at 09:19

## 2017-08-23 RX ADMIN — PANTOPRAZOLE SODIUM 40 MG: 40 TABLET, DELAYED RELEASE ORAL at 06:20

## 2017-08-23 RX ADMIN — Medication 2 TABLET: at 09:19

## 2017-08-23 RX ADMIN — CYCLOBENZAPRINE HYDROCHLORIDE 10 MG: 10 TABLET, FILM COATED ORAL at 21:41

## 2017-08-23 RX ADMIN — HYDROCODONE BITARTRATE AND ACETAMINOPHEN 1 TABLET: 7.5; 325 TABLET ORAL at 06:20

## 2017-08-23 RX ADMIN — HYDROCODONE BITARTRATE AND ACETAMINOPHEN 1 TABLET: 7.5; 325 TABLET ORAL at 12:10

## 2017-08-23 RX ADMIN — METOPROLOL TARTRATE 12.5 MG: 25 TABLET, FILM COATED ORAL at 09:19

## 2017-08-23 RX ADMIN — CETIRIZINE HYDROCHLORIDE 10 MG: 10 TABLET, FILM COATED ORAL at 09:20

## 2017-08-23 RX ADMIN — CYCLOBENZAPRINE HYDROCHLORIDE 10 MG: 10 TABLET, FILM COATED ORAL at 17:21

## 2017-08-23 RX ADMIN — AMLODIPINE BESYLATE 5 MG: 5 TABLET ORAL at 09:19

## 2017-08-23 RX ADMIN — TOPIRAMATE 100 MG: 100 TABLET, FILM COATED ORAL at 21:44

## 2017-08-23 RX ADMIN — INSULIN LISPRO 4 UNITS: 100 INJECTION, SOLUTION INTRAVENOUS; SUBCUTANEOUS at 17:24

## 2017-08-23 RX ADMIN — INSULIN DETEMIR 40 UNITS: 100 INJECTION, SOLUTION SUBCUTANEOUS at 22:20

## 2017-08-23 RX ADMIN — Medication 2 TABLET: at 17:21

## 2017-08-23 RX ADMIN — ASPIRIN 325 MG: 325 TABLET, DELAYED RELEASE ORAL at 09:19

## 2017-08-23 RX ADMIN — GABAPENTIN 300 MG: 300 CAPSULE ORAL at 21:41

## 2017-08-23 RX ADMIN — GUAIFENESIN 600 MG: 600 TABLET, EXTENDED RELEASE ORAL at 09:19

## 2017-08-23 RX ADMIN — INSULIN DETEMIR 40 UNITS: 100 INJECTION, SOLUTION SUBCUTANEOUS at 09:23

## 2017-08-23 RX ADMIN — INDOMETHACIN 75 MG: 75 CAPSULE, EXTENDED RELEASE ORAL at 17:26

## 2017-08-23 RX ADMIN — METOPROLOL TARTRATE 12.5 MG: 25 TABLET, FILM COATED ORAL at 21:41

## 2017-08-23 RX ADMIN — ATORVASTATIN CALCIUM 40 MG: 40 TABLET, FILM COATED ORAL at 21:41

## 2017-08-23 RX ADMIN — GUAIFENESIN 600 MG: 600 TABLET, EXTENDED RELEASE ORAL at 21:41

## 2017-08-23 RX ADMIN — CYCLOBENZAPRINE HYDROCHLORIDE 10 MG: 10 TABLET, FILM COATED ORAL at 09:20

## 2017-08-24 ENCOUNTER — APPOINTMENT (OUTPATIENT)
Dept: GENERAL RADIOLOGY | Facility: HOSPITAL | Age: 57
End: 2017-08-24

## 2017-08-24 LAB
GLUCOSE BLDC GLUCOMTR-MCNC: 169 MG/DL (ref 70–130)
GLUCOSE BLDC GLUCOMTR-MCNC: 172 MG/DL (ref 70–130)
GLUCOSE BLDC GLUCOMTR-MCNC: 179 MG/DL (ref 70–130)
GLUCOSE BLDC GLUCOMTR-MCNC: 253 MG/DL (ref 70–130)

## 2017-08-24 PROCEDURE — 99232 SBSQ HOSP IP/OBS MODERATE 35: CPT | Performed by: NURSE PRACTITIONER

## 2017-08-24 PROCEDURE — 82962 GLUCOSE BLOOD TEST: CPT

## 2017-08-24 PROCEDURE — 97110 THERAPEUTIC EXERCISES: CPT

## 2017-08-24 PROCEDURE — 63710000001 INSULIN DETEMIR PER 5 UNITS: Performed by: HOSPITALIST

## 2017-08-24 PROCEDURE — 99024 POSTOP FOLLOW-UP VISIT: CPT | Performed by: THORACIC SURGERY (CARDIOTHORACIC VASCULAR SURGERY)

## 2017-08-24 PROCEDURE — 71010 HC CHEST PA OR AP: CPT

## 2017-08-24 PROCEDURE — 99232 SBSQ HOSP IP/OBS MODERATE 35: CPT | Performed by: INTERNAL MEDICINE

## 2017-08-24 RX ORDER — MONTELUKAST SODIUM 10 MG/1
TABLET ORAL
Qty: 30 TABLET | Refills: 2 | Status: ON HOLD | OUTPATIENT
Start: 2017-08-24 | End: 2017-12-21 | Stop reason: SDUPTHER

## 2017-08-24 RX ADMIN — HYDROCODONE BITARTRATE AND ACETAMINOPHEN 1 TABLET: 7.5; 325 TABLET ORAL at 06:44

## 2017-08-24 RX ADMIN — Medication 2 TABLET: at 17:08

## 2017-08-24 RX ADMIN — CYCLOBENZAPRINE HYDROCHLORIDE 10 MG: 10 TABLET, FILM COATED ORAL at 22:03

## 2017-08-24 RX ADMIN — INSULIN LISPRO 12 UNITS: 100 INJECTION, SOLUTION INTRAVENOUS; SUBCUTANEOUS at 11:41

## 2017-08-24 RX ADMIN — Medication 2 TABLET: at 10:30

## 2017-08-24 RX ADMIN — ASPIRIN 325 MG: 325 TABLET, DELAYED RELEASE ORAL at 08:24

## 2017-08-24 RX ADMIN — Medication 10 ML: at 10:30

## 2017-08-24 RX ADMIN — METOPROLOL TARTRATE 12.5 MG: 25 TABLET, FILM COATED ORAL at 22:03

## 2017-08-24 RX ADMIN — PANTOPRAZOLE SODIUM 40 MG: 40 TABLET, DELAYED RELEASE ORAL at 06:44

## 2017-08-24 RX ADMIN — TOPIRAMATE 100 MG: 100 TABLET, FILM COATED ORAL at 22:02

## 2017-08-24 RX ADMIN — POLYETHYLENE GLYCOL 3350 17 G: 17 POWDER, FOR SOLUTION ORAL at 08:24

## 2017-08-24 RX ADMIN — INSULIN LISPRO 4 UNITS: 100 INJECTION, SOLUTION INTRAVENOUS; SUBCUTANEOUS at 17:09

## 2017-08-24 RX ADMIN — INDOMETHACIN 75 MG: 75 CAPSULE, EXTENDED RELEASE ORAL at 08:25

## 2017-08-24 RX ADMIN — INSULIN DETEMIR 40 UNITS: 100 INJECTION, SOLUTION SUBCUTANEOUS at 08:31

## 2017-08-24 RX ADMIN — GUAIFENESIN 600 MG: 600 TABLET, EXTENDED RELEASE ORAL at 08:25

## 2017-08-24 RX ADMIN — ATORVASTATIN CALCIUM 40 MG: 40 TABLET, FILM COATED ORAL at 22:02

## 2017-08-24 RX ADMIN — INSULIN LISPRO 4 UNITS: 100 INJECTION, SOLUTION INTRAVENOUS; SUBCUTANEOUS at 22:03

## 2017-08-24 RX ADMIN — INSULIN LISPRO 4 UNITS: 100 INJECTION, SOLUTION INTRAVENOUS; SUBCUTANEOUS at 08:26

## 2017-08-24 RX ADMIN — CYCLOBENZAPRINE HYDROCHLORIDE 10 MG: 10 TABLET, FILM COATED ORAL at 08:25

## 2017-08-24 RX ADMIN — INSULIN DETEMIR 40 UNITS: 100 INJECTION, SOLUTION SUBCUTANEOUS at 22:03

## 2017-08-24 RX ADMIN — METOPROLOL TARTRATE 12.5 MG: 25 TABLET, FILM COATED ORAL at 08:26

## 2017-08-24 RX ADMIN — AMLODIPINE BESYLATE 5 MG: 5 TABLET ORAL at 08:25

## 2017-08-24 RX ADMIN — CETIRIZINE HYDROCHLORIDE 10 MG: 10 TABLET, FILM COATED ORAL at 08:26

## 2017-08-24 RX ADMIN — CYCLOBENZAPRINE HYDROCHLORIDE 10 MG: 10 TABLET, FILM COATED ORAL at 17:08

## 2017-08-24 RX ADMIN — GABAPENTIN 300 MG: 300 CAPSULE ORAL at 22:02

## 2017-08-24 RX ADMIN — GABAPENTIN 300 MG: 300 CAPSULE ORAL at 08:24

## 2017-08-24 RX ADMIN — GUAIFENESIN 600 MG: 600 TABLET, EXTENDED RELEASE ORAL at 22:02

## 2017-08-24 RX ADMIN — BISACODYL 10 MG: 5 TABLET, COATED ORAL at 22:03

## 2017-08-24 RX ADMIN — INDOMETHACIN 75 MG: 75 CAPSULE, EXTENDED RELEASE ORAL at 17:08

## 2017-08-25 ENCOUNTER — APPOINTMENT (OUTPATIENT)
Dept: GENERAL RADIOLOGY | Facility: HOSPITAL | Age: 57
End: 2017-08-25

## 2017-08-25 ENCOUNTER — ANESTHESIA EVENT (OUTPATIENT)
Dept: PERIOP | Facility: HOSPITAL | Age: 57
End: 2017-08-25

## 2017-08-25 ENCOUNTER — ANESTHESIA (OUTPATIENT)
Dept: PERIOP | Facility: HOSPITAL | Age: 57
End: 2017-08-25

## 2017-08-25 ENCOUNTER — APPOINTMENT (OUTPATIENT)
Dept: CARDIOLOGY | Facility: HOSPITAL | Age: 57
End: 2017-08-25
Attending: THORACIC SURGERY (CARDIOTHORACIC VASCULAR SURGERY)

## 2017-08-25 ENCOUNTER — APPOINTMENT (OUTPATIENT)
Dept: CT IMAGING | Facility: HOSPITAL | Age: 57
End: 2017-08-25

## 2017-08-25 LAB
ABO GROUP BLD: NORMAL
BH CV ECHO MEAS - AO ROOT AREA (BSA CORRECTED): 1.4
BH CV ECHO MEAS - AO ROOT AREA: 9.1 CM^2
BH CV ECHO MEAS - AO ROOT DIAM: 3.4 CM
BH CV ECHO MEAS - BSA(HAYCOCK): 2.6 M^2
BH CV ECHO MEAS - BSA: 2.4 M^2
BH CV ECHO MEAS - BZI_BMI: 40.9 KILOGRAMS/M^2
BH CV ECHO MEAS - BZI_METRIC_HEIGHT: 177.8 CM
BH CV ECHO MEAS - BZI_METRIC_WEIGHT: 129.3 KG
BH CV ECHO MEAS - CONTRAST EF (2CH): 49.1 ML/M^2
BH CV ECHO MEAS - CONTRAST EF 4CH: 52.6 ML/M^2
BH CV ECHO MEAS - EDV(CUBED): 54.9 ML
BH CV ECHO MEAS - EDV(MOD-SP2): 53 ML
BH CV ECHO MEAS - EDV(MOD-SP4): 133 ML
BH CV ECHO MEAS - EDV(TEICH): 62 ML
BH CV ECHO MEAS - EF(CUBED): 59.1 %
BH CV ECHO MEAS - EF(MOD-SP2): 49.1 %
BH CV ECHO MEAS - EF(MOD-SP4): 52.6 %
BH CV ECHO MEAS - EF(TEICH): 51.5 %
BH CV ECHO MEAS - ESV(CUBED): 22.4 ML
BH CV ECHO MEAS - ESV(MOD-SP2): 27 ML
BH CV ECHO MEAS - ESV(MOD-SP4): 63 ML
BH CV ECHO MEAS - ESV(TEICH): 30.1 ML
BH CV ECHO MEAS - FS: 25.8 %
BH CV ECHO MEAS - IVS/LVPW: 0.87
BH CV ECHO MEAS - IVSD: 1.2 CM
BH CV ECHO MEAS - LA DIMENSION: 3.9 CM
BH CV ECHO MEAS - LA/AO: 1.1
BH CV ECHO MEAS - LAT PEAK E' VEL: 8.3 CM/SEC
BH CV ECHO MEAS - LV DIASTOLIC VOL/BSA (35-75): 54.8 ML/M^2
BH CV ECHO MEAS - LV MASS(C)D: 177.2 GRAMS
BH CV ECHO MEAS - LV MASS(C)DI: 73 GRAMS/M^2
BH CV ECHO MEAS - LV MAX PG: 3.6 MMHG
BH CV ECHO MEAS - LV MEAN PG: 2 MMHG
BH CV ECHO MEAS - LV SYSTOLIC VOL/BSA (12-30): 26 ML/M^2
BH CV ECHO MEAS - LV V1 MAX: 95.5 CM/SEC
BH CV ECHO MEAS - LV V1 MEAN: 61.3 CM/SEC
BH CV ECHO MEAS - LV V1 VTI: 21.8 CM
BH CV ECHO MEAS - LVIDD: 3.8 CM
BH CV ECHO MEAS - LVIDS: 2.8 CM
BH CV ECHO MEAS - LVLD AP2: 6.3 CM
BH CV ECHO MEAS - LVLD AP4: 8.8 CM
BH CV ECHO MEAS - LVLS AP2: 5.6 CM
BH CV ECHO MEAS - LVLS AP4: 7.5 CM
BH CV ECHO MEAS - LVOT AREA (M): 4.5 CM^2
BH CV ECHO MEAS - LVOT AREA: 4.5 CM^2
BH CV ECHO MEAS - LVOT DIAM: 2.4 CM
BH CV ECHO MEAS - LVPWD: 1.4 CM
BH CV ECHO MEAS - MED PEAK E' VEL: 7.9 CM/SEC
BH CV ECHO MEAS - MV A MAX VEL: 80.5 CM/SEC
BH CV ECHO MEAS - MV E MAX VEL: 99.2 CM/SEC
BH CV ECHO MEAS - MV E/A: 1.2
BH CV ECHO MEAS - PA ACC SLOPE: 1034 CM/SEC^2
BH CV ECHO MEAS - PA ACC TIME: 0.08 SEC
BH CV ECHO MEAS - PA PR(ACCEL): 43.5 MMHG
BH CV ECHO MEAS - RVDD: 3.1 CM
BH CV ECHO MEAS - SI(CUBED): 13.4 ML/M^2
BH CV ECHO MEAS - SI(LVOT): 40.6 ML/M^2
BH CV ECHO MEAS - SI(MOD-SP2): 10.7 ML/M^2
BH CV ECHO MEAS - SI(MOD-SP4): 28.8 ML/M^2
BH CV ECHO MEAS - SI(TEICH): 13.1 ML/M^2
BH CV ECHO MEAS - SV(CUBED): 32.4 ML
BH CV ECHO MEAS - SV(LVOT): 98.6 ML
BH CV ECHO MEAS - SV(MOD-SP2): 26 ML
BH CV ECHO MEAS - SV(MOD-SP4): 70 ML
BH CV ECHO MEAS - SV(TEICH): 31.9 ML
BH CV VAS BP RIGHT ARM: NORMAL MMHG
BH CV XLRA - RV BASE: 3 CM
BH CV XLRA - RV LENGTH: 5.7 CM
BH CV XLRA - RV MID: 2.9 CM
BH CV XLRA - TDI S': 10.5 CM/SEC
BLD GP AB SCN SERPL QL: NEGATIVE
E/E' RATIO: 11
GLUCOSE BLDC GLUCOMTR-MCNC: 118 MG/DL (ref 70–130)
GLUCOSE BLDC GLUCOMTR-MCNC: 134 MG/DL (ref 70–130)
GLUCOSE BLDC GLUCOMTR-MCNC: 224 MG/DL (ref 70–130)
GLUCOSE BLDC GLUCOMTR-MCNC: 81 MG/DL (ref 70–130)
LEFT ATRIUM VOLUME INDEX: 21.4 ML/M2
RH BLD: POSITIVE

## 2017-08-25 PROCEDURE — 71250 CT THORAX DX C-: CPT

## 2017-08-25 PROCEDURE — 99232 SBSQ HOSP IP/OBS MODERATE 35: CPT | Performed by: INTERNAL MEDICINE

## 2017-08-25 PROCEDURE — 33025 INCISION OF HEART SAC: CPT | Performed by: THORACIC SURGERY (CARDIOTHORACIC VASCULAR SURGERY)

## 2017-08-25 PROCEDURE — 86850 RBC ANTIBODY SCREEN: CPT | Performed by: THORACIC SURGERY (CARDIOTHORACIC VASCULAR SURGERY)

## 2017-08-25 PROCEDURE — 87116 MYCOBACTERIA CULTURE: CPT | Performed by: THORACIC SURGERY (CARDIOTHORACIC VASCULAR SURGERY)

## 2017-08-25 PROCEDURE — 25010000002 PHENYLEPHRINE PER 1 ML: Performed by: NURSE ANESTHETIST, CERTIFIED REGISTERED

## 2017-08-25 PROCEDURE — 87015 SPECIMEN INFECT AGNT CONCNTJ: CPT | Performed by: THORACIC SURGERY (CARDIOTHORACIC VASCULAR SURGERY)

## 2017-08-25 PROCEDURE — 87075 CULTR BACTERIA EXCEPT BLOOD: CPT | Performed by: THORACIC SURGERY (CARDIOTHORACIC VASCULAR SURGERY)

## 2017-08-25 PROCEDURE — 87102 FUNGUS ISOLATION CULTURE: CPT | Performed by: THORACIC SURGERY (CARDIOTHORACIC VASCULAR SURGERY)

## 2017-08-25 PROCEDURE — 88305 TISSUE EXAM BY PATHOLOGIST: CPT | Performed by: THORACIC SURGERY (CARDIOTHORACIC VASCULAR SURGERY)

## 2017-08-25 PROCEDURE — 25010000002 ONDANSETRON PER 1 MG: Performed by: PHYSICIAN ASSISTANT

## 2017-08-25 PROCEDURE — 99232 SBSQ HOSP IP/OBS MODERATE 35: CPT | Performed by: NURSE PRACTITIONER

## 2017-08-25 PROCEDURE — 0W9D00Z DRAINAGE OF PERICARDIAL CAVITY WITH DRAINAGE DEVICE, OPEN APPROACH: ICD-10-PCS | Performed by: THORACIC SURGERY (CARDIOTHORACIC VASCULAR SURGERY)

## 2017-08-25 PROCEDURE — 86923 COMPATIBILITY TEST ELECTRIC: CPT

## 2017-08-25 PROCEDURE — 25010000002 FENTANYL CITRATE (PF) 100 MCG/2ML SOLUTION: Performed by: ANESTHESIOLOGY

## 2017-08-25 PROCEDURE — 25010000002 HYDROMORPHONE PER 4 MG: Performed by: ANESTHESIOLOGY

## 2017-08-25 PROCEDURE — 86900 BLOOD TYPING SEROLOGIC ABO: CPT | Performed by: THORACIC SURGERY (CARDIOTHORACIC VASCULAR SURGERY)

## 2017-08-25 PROCEDURE — 93306 TTE W/DOPPLER COMPLETE: CPT | Performed by: INTERNAL MEDICINE

## 2017-08-25 PROCEDURE — 86901 BLOOD TYPING SEROLOGIC RH(D): CPT | Performed by: THORACIC SURGERY (CARDIOTHORACIC VASCULAR SURGERY)

## 2017-08-25 PROCEDURE — 02HV33Z INSERTION OF INFUSION DEVICE INTO SUPERIOR VENA CAVA, PERCUTANEOUS APPROACH: ICD-10-PCS | Performed by: INTERNAL MEDICINE

## 2017-08-25 PROCEDURE — 93306 TTE W/DOPPLER COMPLETE: CPT

## 2017-08-25 PROCEDURE — 87070 CULTURE OTHR SPECIMN AEROBIC: CPT | Performed by: THORACIC SURGERY (CARDIOTHORACIC VASCULAR SURGERY)

## 2017-08-25 PROCEDURE — 87205 SMEAR GRAM STAIN: CPT | Performed by: THORACIC SURGERY (CARDIOTHORACIC VASCULAR SURGERY)

## 2017-08-25 PROCEDURE — 25010000002 CEFUROXIME PER 750 MG: Performed by: PHYSICIAN ASSISTANT

## 2017-08-25 PROCEDURE — 71010 HC CHEST PA OR AP: CPT

## 2017-08-25 PROCEDURE — 25010000002 PROPOFOL 10 MG/ML EMULSION: Performed by: NURSE ANESTHETIST, CERTIFIED REGISTERED

## 2017-08-25 PROCEDURE — 63710000001 INSULIN DETEMIR PER 5 UNITS: Performed by: HOSPITALIST

## 2017-08-25 PROCEDURE — 25010000002 PROMETHAZINE PER 50 MG: Performed by: INTERNAL MEDICINE

## 2017-08-25 PROCEDURE — 99233 SBSQ HOSP IP/OBS HIGH 50: CPT | Performed by: INTERNAL MEDICINE

## 2017-08-25 PROCEDURE — 93005 ELECTROCARDIOGRAM TRACING: CPT | Performed by: ANESTHESIOLOGY

## 2017-08-25 PROCEDURE — 87206 SMEAR FLUORESCENT/ACID STAI: CPT | Performed by: THORACIC SURGERY (CARDIOTHORACIC VASCULAR SURGERY)

## 2017-08-25 PROCEDURE — 99024 POSTOP FOLLOW-UP VISIT: CPT | Performed by: PHYSICIAN ASSISTANT

## 2017-08-25 PROCEDURE — 25010000002 HYDROMORPHONE PER 4 MG: Performed by: THORACIC SURGERY (CARDIOTHORACIC VASCULAR SURGERY)

## 2017-08-25 PROCEDURE — C1751 CATH, INF, PER/CENT/MIDLINE: HCPCS

## 2017-08-25 PROCEDURE — C1894 INTRO/SHEATH, NON-LASER: HCPCS

## 2017-08-25 PROCEDURE — 82962 GLUCOSE BLOOD TEST: CPT

## 2017-08-25 PROCEDURE — 25010000002 SUCCINYLCHOLINE PER 20 MG: Performed by: NURSE ANESTHETIST, CERTIFIED REGISTERED

## 2017-08-25 PROCEDURE — 33025 INCISION OF HEART SAC: CPT | Performed by: PHYSICIAN ASSISTANT

## 2017-08-25 RX ORDER — ONDANSETRON 2 MG/ML
4 INJECTION INTRAMUSCULAR; INTRAVENOUS ONCE AS NEEDED
Status: DISCONTINUED | OUTPATIENT
Start: 2017-08-25 | End: 2017-08-25 | Stop reason: HOSPADM

## 2017-08-25 RX ORDER — PROPOFOL 10 MG/ML
VIAL (ML) INTRAVENOUS AS NEEDED
Status: DISCONTINUED | OUTPATIENT
Start: 2017-08-25 | End: 2017-08-25 | Stop reason: SURG

## 2017-08-25 RX ORDER — ROCURONIUM BROMIDE 10 MG/ML
INJECTION, SOLUTION INTRAVENOUS AS NEEDED
Status: DISCONTINUED | OUTPATIENT
Start: 2017-08-25 | End: 2017-08-25 | Stop reason: SURG

## 2017-08-25 RX ORDER — FENTANYL CITRATE 50 UG/ML
50 INJECTION, SOLUTION INTRAMUSCULAR; INTRAVENOUS
Status: DISCONTINUED | OUTPATIENT
Start: 2017-08-25 | End: 2017-08-25 | Stop reason: HOSPADM

## 2017-08-25 RX ORDER — PROMETHAZINE HYDROCHLORIDE 25 MG/ML
12.5 INJECTION, SOLUTION INTRAMUSCULAR; INTRAVENOUS ONCE AS NEEDED
Status: DISCONTINUED | OUTPATIENT
Start: 2017-08-25 | End: 2017-08-25 | Stop reason: HOSPADM

## 2017-08-25 RX ORDER — COLCHICINE 0.6 MG/1
0.6 TABLET ORAL DAILY
Status: DISCONTINUED | OUTPATIENT
Start: 2017-08-25 | End: 2017-09-05 | Stop reason: HOSPADM

## 2017-08-25 RX ORDER — SODIUM CHLORIDE 9 MG/ML
30 INJECTION, SOLUTION INTRAVENOUS CONTINUOUS
Status: DISCONTINUED | OUTPATIENT
Start: 2017-08-25 | End: 2017-09-05 | Stop reason: HOSPADM

## 2017-08-25 RX ORDER — POTASSIUM CHLORIDE 1.5 G/1.77G
10 POWDER, FOR SOLUTION ORAL ONCE
Status: DISCONTINUED | OUTPATIENT
Start: 2017-08-25 | End: 2017-09-02

## 2017-08-25 RX ORDER — HYDROMORPHONE HYDROCHLORIDE 1 MG/ML
0.5 INJECTION, SOLUTION INTRAMUSCULAR; INTRAVENOUS; SUBCUTANEOUS
Status: DISCONTINUED | OUTPATIENT
Start: 2017-08-25 | End: 2017-08-25 | Stop reason: HOSPADM

## 2017-08-25 RX ORDER — ONDANSETRON 2 MG/ML
4 INJECTION INTRAMUSCULAR; INTRAVENOUS EVERY 6 HOURS PRN
Status: DISCONTINUED | OUTPATIENT
Start: 2017-08-25 | End: 2017-09-05 | Stop reason: HOSPADM

## 2017-08-25 RX ORDER — INDOMETHACIN 75 MG/1
75 CAPSULE, EXTENDED RELEASE ORAL 2 TIMES DAILY WITH MEALS
Qty: 14 CAPSULE | Refills: 0 | Status: SHIPPED | OUTPATIENT
Start: 2017-08-25 | End: 2017-09-01

## 2017-08-25 RX ORDER — SENNA AND DOCUSATE SODIUM 50; 8.6 MG/1; MG/1
2 TABLET, FILM COATED ORAL NIGHTLY
Status: DISCONTINUED | OUTPATIENT
Start: 2017-08-25 | End: 2017-09-05 | Stop reason: HOSPADM

## 2017-08-25 RX ORDER — OXYCODONE HYDROCHLORIDE AND ACETAMINOPHEN 5; 325 MG/1; MG/1
2 TABLET ORAL EVERY 4 HOURS PRN
Status: DISPENSED | OUTPATIENT
Start: 2017-08-25 | End: 2017-09-04

## 2017-08-25 RX ORDER — SUCCINYLCHOLINE CHLORIDE 20 MG/ML
INJECTION INTRAMUSCULAR; INTRAVENOUS AS NEEDED
Status: DISCONTINUED | OUTPATIENT
Start: 2017-08-25 | End: 2017-08-25 | Stop reason: SURG

## 2017-08-25 RX ORDER — SODIUM CHLORIDE 0.9 % (FLUSH) 0.9 %
10 SYRINGE (ML) INJECTION AS NEEDED
Status: DISCONTINUED | OUTPATIENT
Start: 2017-08-25 | End: 2017-09-05 | Stop reason: HOSPADM

## 2017-08-25 RX ORDER — BUMETANIDE 0.25 MG/ML
2 INJECTION INTRAMUSCULAR; INTRAVENOUS ONCE
Status: DISCONTINUED | OUTPATIENT
Start: 2017-08-25 | End: 2017-09-01

## 2017-08-25 RX ORDER — ATRACURIUM BESYLATE 10 MG/ML
INJECTION, SOLUTION INTRAVENOUS AS NEEDED
Status: DISCONTINUED | OUTPATIENT
Start: 2017-08-25 | End: 2017-08-25 | Stop reason: SURG

## 2017-08-25 RX ORDER — ONDANSETRON 4 MG/1
4 TABLET, FILM COATED ORAL EVERY 6 HOURS PRN
Status: DISCONTINUED | OUTPATIENT
Start: 2017-08-25 | End: 2017-08-26

## 2017-08-25 RX ORDER — SODIUM CHLORIDE, SODIUM LACTATE, POTASSIUM CHLORIDE, CALCIUM CHLORIDE 600; 310; 30; 20 MG/100ML; MG/100ML; MG/100ML; MG/100ML
9 INJECTION, SOLUTION INTRAVENOUS CONTINUOUS
Status: DISCONTINUED | OUTPATIENT
Start: 2017-08-25 | End: 2017-08-26

## 2017-08-25 RX ORDER — POLYETHYLENE GLYCOL 3350 17 G/17G
17 POWDER, FOR SOLUTION ORAL DAILY
Status: DISCONTINUED | OUTPATIENT
Start: 2017-08-25 | End: 2017-08-26 | Stop reason: SDUPTHER

## 2017-08-25 RX ORDER — INDOMETHACIN 75 MG/1
75 CAPSULE, EXTENDED RELEASE ORAL 2 TIMES DAILY WITH MEALS
Status: COMPLETED | OUTPATIENT
Start: 2017-08-25 | End: 2017-08-31

## 2017-08-25 RX ORDER — IPRATROPIUM BROMIDE AND ALBUTEROL SULFATE 2.5; .5 MG/3ML; MG/3ML
3 SOLUTION RESPIRATORY (INHALATION) EVERY 6 HOURS PRN
Status: DISCONTINUED | OUTPATIENT
Start: 2017-08-25 | End: 2017-08-26

## 2017-08-25 RX ORDER — SODIUM CHLORIDE 0.9 % (FLUSH) 0.9 %
1-10 SYRINGE (ML) INJECTION AS NEEDED
Status: DISCONTINUED | OUTPATIENT
Start: 2017-08-25 | End: 2017-08-25 | Stop reason: HOSPADM

## 2017-08-25 RX ORDER — LIDOCAINE HYDROCHLORIDE 10 MG/ML
0.5 INJECTION, SOLUTION EPIDURAL; INFILTRATION; INTRACAUDAL; PERINEURAL ONCE AS NEEDED
Status: DISCONTINUED | OUTPATIENT
Start: 2017-08-25 | End: 2017-08-25 | Stop reason: HOSPADM

## 2017-08-25 RX ORDER — BISACODYL 10 MG
10 SUPPOSITORY, RECTAL RECTAL DAILY PRN
Status: DISCONTINUED | OUTPATIENT
Start: 2017-08-25 | End: 2017-08-26

## 2017-08-25 RX ORDER — LIDOCAINE HYDROCHLORIDE 10 MG/ML
INJECTION, SOLUTION EPIDURAL; INFILTRATION; INTRACAUDAL; PERINEURAL AS NEEDED
Status: DISCONTINUED | OUTPATIENT
Start: 2017-08-25 | End: 2017-08-25 | Stop reason: SURG

## 2017-08-25 RX ADMIN — HYDROMORPHONE HYDROCHLORIDE 0.5 MG: 1 INJECTION, SOLUTION INTRAMUSCULAR; INTRAVENOUS; SUBCUTANEOUS at 17:50

## 2017-08-25 RX ADMIN — METOPROLOL TARTRATE 12.5 MG: 25 TABLET, FILM COATED ORAL at 20:30

## 2017-08-25 RX ADMIN — POLYETHYLENE GLYCOL 3350 17 G: 17 POWDER, FOR SOLUTION ORAL at 08:56

## 2017-08-25 RX ADMIN — HYDROCODONE BITARTRATE AND ACETAMINOPHEN 1 TABLET: 7.5; 325 TABLET ORAL at 09:00

## 2017-08-25 RX ADMIN — ATORVASTATIN CALCIUM 40 MG: 40 TABLET, FILM COATED ORAL at 20:31

## 2017-08-25 RX ADMIN — INDOMETHACIN 75 MG: 75 CAPSULE, EXTENDED RELEASE ORAL at 08:56

## 2017-08-25 RX ADMIN — Medication 2 TABLET: at 20:31

## 2017-08-25 RX ADMIN — INSULIN DETEMIR 40 UNITS: 100 INJECTION, SOLUTION SUBCUTANEOUS at 09:20

## 2017-08-25 RX ADMIN — TOPIRAMATE 100 MG: 100 TABLET, FILM COATED ORAL at 20:31

## 2017-08-25 RX ADMIN — METOPROLOL TARTRATE 12.5 MG: 25 TABLET, FILM COATED ORAL at 08:56

## 2017-08-25 RX ADMIN — Medication 2 TABLET: at 08:56

## 2017-08-25 RX ADMIN — PANTOPRAZOLE SODIUM 40 MG: 40 TABLET, DELAYED RELEASE ORAL at 08:56

## 2017-08-25 RX ADMIN — CYCLOBENZAPRINE HYDROCHLORIDE 10 MG: 10 TABLET, FILM COATED ORAL at 08:56

## 2017-08-25 RX ADMIN — CEFUROXIME 1.5 G: 1.5 INJECTION, SOLUTION INTRAVENOUS at 23:58

## 2017-08-25 RX ADMIN — ATRACURIUM BESYLATE 20 MG: 10 INJECTION, SOLUTION INTRAVENOUS at 15:55

## 2017-08-25 RX ADMIN — CYCLOBENZAPRINE HYDROCHLORIDE 10 MG: 10 TABLET, FILM COATED ORAL at 20:31

## 2017-08-25 RX ADMIN — AMLODIPINE BESYLATE 5 MG: 5 TABLET ORAL at 08:56

## 2017-08-25 RX ADMIN — INSULIN LISPRO 8 UNITS: 100 INJECTION, SOLUTION INTRAVENOUS; SUBCUTANEOUS at 13:24

## 2017-08-25 RX ADMIN — SODIUM CHLORIDE, POTASSIUM CHLORIDE, SODIUM LACTATE AND CALCIUM CHLORIDE: 600; 310; 30; 20 INJECTION, SOLUTION INTRAVENOUS at 14:29

## 2017-08-25 RX ADMIN — HYDROMORPHONE HYDROCHLORIDE 0.5 MG: 1 INJECTION, SOLUTION INTRAMUSCULAR; INTRAVENOUS; SUBCUTANEOUS at 18:00

## 2017-08-25 RX ADMIN — HYDROMORPHONE HYDROCHLORIDE 0.5 MG: 1 INJECTION, SOLUTION INTRAMUSCULAR; INTRAVENOUS; SUBCUTANEOUS at 18:37

## 2017-08-25 RX ADMIN — Medication 10 ML: at 18:38

## 2017-08-25 RX ADMIN — GABAPENTIN 300 MG: 300 CAPSULE ORAL at 08:56

## 2017-08-25 RX ADMIN — INDOMETHACIN 75 MG: 75 CAPSULE, EXTENDED RELEASE ORAL at 21:10

## 2017-08-25 RX ADMIN — SODIUM CHLORIDE 30 ML/HR: 9 INJECTION, SOLUTION INTRAVENOUS at 18:37

## 2017-08-25 RX ADMIN — HYDROMORPHONE HYDROCHLORIDE 0.5 MG: 1 INJECTION, SOLUTION INTRAMUSCULAR; INTRAVENOUS; SUBCUTANEOUS at 21:12

## 2017-08-25 RX ADMIN — GUAIFENESIN 600 MG: 600 TABLET, EXTENDED RELEASE ORAL at 20:33

## 2017-08-25 RX ADMIN — NICARDIPINE HYDROCHLORIDE 5 MG/HR: 25 INJECTION INTRAVENOUS at 18:37

## 2017-08-25 RX ADMIN — ASPIRIN 325 MG: 325 TABLET, DELAYED RELEASE ORAL at 08:56

## 2017-08-25 RX ADMIN — SUCCINYLCHOLINE CHLORIDE 180 MG: 20 INJECTION, SOLUTION INTRAMUSCULAR; INTRAVENOUS at 15:43

## 2017-08-25 RX ADMIN — GUAIFENESIN 600 MG: 600 TABLET, EXTENDED RELEASE ORAL at 08:56

## 2017-08-25 RX ADMIN — INSULIN DETEMIR 40 UNITS: 100 INJECTION, SOLUTION SUBCUTANEOUS at 23:59

## 2017-08-25 RX ADMIN — PHENYLEPHRINE HYDROCHLORIDE 100 MCG: 10 INJECTION INTRAVENOUS at 15:43

## 2017-08-25 RX ADMIN — LIDOCAINE HYDROCHLORIDE 60 MG: 10 INJECTION, SOLUTION EPIDURAL; INFILTRATION; INTRACAUDAL; PERINEURAL at 15:43

## 2017-08-25 RX ADMIN — HYDROMORPHONE HYDROCHLORIDE 0.5 MG: 1 INJECTION, SOLUTION INTRAMUSCULAR; INTRAVENOUS; SUBCUTANEOUS at 19:43

## 2017-08-25 RX ADMIN — FENTANYL CITRATE 250 MCG: 50 INJECTION, SOLUTION INTRAMUSCULAR; INTRAVENOUS at 15:43

## 2017-08-25 RX ADMIN — ROCURONIUM BROMIDE 5 MG: 10 INJECTION INTRAVENOUS at 15:43

## 2017-08-25 RX ADMIN — PROMETHAZINE HYDROCHLORIDE 6.25 MG: 25 INJECTION, SOLUTION INTRAMUSCULAR; INTRAVENOUS at 21:09

## 2017-08-25 RX ADMIN — PROPOFOL 150 MG: 10 INJECTION, EMULSION INTRAVENOUS at 15:43

## 2017-08-25 RX ADMIN — CEFUROXIME 1.5 G: 1.5 INJECTION, SOLUTION INTRAVENOUS at 16:00

## 2017-08-25 RX ADMIN — NICARDIPINE HYDROCHLORIDE 10 MG/HR: 25 INJECTION INTRAVENOUS at 21:12

## 2017-08-25 RX ADMIN — GABAPENTIN 300 MG: 300 CAPSULE ORAL at 20:31

## 2017-08-25 RX ADMIN — ONDANSETRON 4 MG: 2 INJECTION INTRAMUSCULAR; INTRAVENOUS at 16:05

## 2017-08-25 RX ADMIN — OXYCODONE AND ACETAMINOPHEN 2 TABLET: 5; 325 TABLET ORAL at 21:09

## 2017-08-25 RX ADMIN — CETIRIZINE HYDROCHLORIDE 10 MG: 10 TABLET, FILM COATED ORAL at 08:56

## 2017-08-25 NOTE — ANESTHESIA PREPROCEDURE EVALUATION
Anesthesia Evaluation     Patient summary reviewed and Nursing notes reviewed   NPO Solid Status: > 8 hours  NPO Liquid Status: > 8 hours     Airway   Mallampati: I  TM distance: <3 FB  Neck ROM: full  no difficulty expected  Dental - normal exam     Pulmonary - normal exam   (+) COPD, asthma, shortness of breath, sleep apnea,   Cardiovascular     Rhythm: regular    (+) hypertension, CAD, CABG (8?15/17), hyperlipidemia      Neuro/Psych  (+) CVA, headaches, dizziness/light headedness, numbness, psychiatric history Anxiety and Depression,    GI/Hepatic/Renal/Endo    (+) morbid obesity, GERD, diabetes mellitus,     Musculoskeletal     Abdominal  - normal exam    Bowel sounds: normal.   Substance History - negative use     OB/GYN negative ob/gyn ROS         Other   (+) arthritis                                   Anesthesia Plan    ASA 3     general   ([place])  intravenous induction   Anesthetic plan and risks discussed with patient.    Plan discussed with CRNA.

## 2017-08-25 NOTE — ANESTHESIA PROCEDURE NOTES
Airway  Urgency: elective    Airway not difficult    General Information and Staff    Patient location during procedure: OR    Indications and Patient Condition  Indications for airway management: airway protection    Preoxygenated: yes  MILS not maintained throughout  Mask difficulty assessment: 1 - vent by mask    Final Airway Details  Final airway type: endotracheal airway      Successful airway: ETT  Cuffed: yes   Successful intubation technique: direct laryngoscopy  Facilitating devices/methods: cricoid pressure  Endotracheal tube insertion site: oral  Blade: Gi  Blade size: #3  ETT size: 7.5 mm  Cormack-Lehane Classification: grade I - full view of glottis  Placement verified by: chest auscultation and capnometry   Number of attempts at approach: 1    Additional Comments  Negative epigastric sounds, Breath sound equal bilaterally with symmetric chest rise and fall

## 2017-08-25 NOTE — ANESTHESIA POSTPROCEDURE EVALUATION
Patient: Denzel Harding    Procedure Summary     Date Anesthesia Start Anesthesia Stop Room / Location    08/25/17 1536 1706 [facility] / [facility]       Procedure Diagnosis Surgeon Provider    PERICARDIAL WINDOW (N/A Chest) No diagnosis on file. MD Shahriar Quintana MD          Anesthesia Type: general  Last vitals  BP   167/68 (08/25/17 1705)    Temp   98.1 °F (36.7 °C) (08/25/17 1705)    Pulse   93 (08/25/17 1705)   Resp   16 (08/25/17 1705)    SpO2   94 % (08/25/17 1705)      Post Anesthesia Care and Evaluation    Patient location during evaluation: PACU  Patient participation: complete - patient participated  Level of consciousness: awake and alert  Pain score: 0  Pain management: adequate  Airway patency: patent  Anesthetic complications: No anesthetic complications  PONV Status: none  Cardiovascular status: hemodynamically stable and acceptable  Respiratory status: nonlabored ventilation, acceptable and nasal cannula  Hydration status: acceptable

## 2017-08-26 ENCOUNTER — APPOINTMENT (OUTPATIENT)
Dept: GENERAL RADIOLOGY | Facility: HOSPITAL | Age: 57
End: 2017-08-26

## 2017-08-26 LAB
ALBUMIN SERPL-MCNC: 3.7 G/DL (ref 3.2–4.8)
ALBUMIN/GLOB SERPL: 1.4 G/DL (ref 1.5–2.5)
ALP SERPL-CCNC: 117 U/L (ref 25–100)
ALT SERPL W P-5'-P-CCNC: 23 U/L (ref 7–40)
ANION GAP SERPL CALCULATED.3IONS-SCNC: 7 MMOL/L (ref 3–11)
ANION GAP SERPL CALCULATED.3IONS-SCNC: 7 MMOL/L (ref 3–11)
ARTERIAL PATENCY WRIST A: ABNORMAL
AST SERPL-CCNC: 30 U/L (ref 0–33)
ATMOSPHERIC PRESS: ABNORMAL MMHG
BASE EXCESS BLDA CALC-SCNC: -0.9 MMOL/L (ref 0–2)
BASOPHILS # BLD AUTO: 0.02 10*3/MM3 (ref 0–0.2)
BASOPHILS NFR BLD AUTO: 0.2 % (ref 0–1)
BDY SITE: ABNORMAL
BILIRUB SERPL-MCNC: 0.6 MG/DL (ref 0.3–1.2)
BUN BLD-MCNC: 22 MG/DL (ref 9–23)
BUN BLD-MCNC: 22 MG/DL (ref 9–23)
BUN/CREAT SERPL: 27.5 (ref 7–25)
BUN/CREAT SERPL: 27.5 (ref 7–25)
CALCIUM SPEC-SCNC: 8.9 MG/DL (ref 8.7–10.4)
CALCIUM SPEC-SCNC: 8.9 MG/DL (ref 8.7–10.4)
CHLORIDE SERPL-SCNC: 104 MMOL/L (ref 99–109)
CHLORIDE SERPL-SCNC: 104 MMOL/L (ref 99–109)
CO2 BLDA-SCNC: 25 MMOL/L (ref 22–33)
CO2 SERPL-SCNC: 23 MMOL/L (ref 20–31)
CO2 SERPL-SCNC: 23 MMOL/L (ref 20–31)
COHGB MFR BLD: 0.7 % (ref 0–2)
CREAT BLD-MCNC: 0.8 MG/DL (ref 0.6–1.3)
CREAT BLD-MCNC: 0.8 MG/DL (ref 0.6–1.3)
DEPRECATED RDW RBC AUTO: 40.2 FL (ref 37–54)
EOSINOPHIL # BLD AUTO: 0.07 10*3/MM3 (ref 0–0.3)
EOSINOPHIL NFR BLD AUTO: 0.6 % (ref 0–3)
ERYTHROCYTE [DISTWIDTH] IN BLOOD BY AUTOMATED COUNT: 13.2 % (ref 11.3–14.5)
GFR SERPL CREATININE-BSD FRML MDRD: 100 ML/MIN/1.73
GFR SERPL CREATININE-BSD FRML MDRD: 100 ML/MIN/1.73
GLOBULIN UR ELPH-MCNC: 2.7 GM/DL
GLUCOSE BLD-MCNC: 169 MG/DL (ref 70–100)
GLUCOSE BLD-MCNC: 169 MG/DL (ref 70–100)
GLUCOSE BLDC GLUCOMTR-MCNC: 207 MG/DL (ref 70–130)
GLUCOSE BLDC GLUCOMTR-MCNC: 232 MG/DL (ref 70–130)
GLUCOSE BLDC GLUCOMTR-MCNC: 299 MG/DL (ref 70–130)
GLUCOSE BLDC GLUCOMTR-MCNC: 309 MG/DL (ref 70–130)
HCO3 BLDA-SCNC: 23.8 MMOL/L (ref 20–26)
HCT VFR BLD AUTO: 30.5 % (ref 38.9–50.9)
HCT VFR BLD CALC: 29.8 %
HGB BLD-MCNC: 10 G/DL (ref 13.1–17.5)
HGB BLDA-MCNC: 9.7 G/DL (ref 13.5–17.5)
HOROWITZ INDEX BLD+IHG-RTO: 21 %
IMM GRANULOCYTES # BLD: 0.08 10*3/MM3 (ref 0–0.03)
IMM GRANULOCYTES NFR BLD: 0.7 % (ref 0–0.6)
LYMPHOCYTES # BLD AUTO: 0.51 10*3/MM3 (ref 0.6–4.8)
LYMPHOCYTES NFR BLD AUTO: 4.2 % (ref 24–44)
MAGNESIUM SERPL-MCNC: 2.2 MG/DL (ref 1.3–2.7)
MCH RBC QN AUTO: 27.5 PG (ref 27–31)
MCHC RBC AUTO-ENTMCNC: 32.8 G/DL (ref 32–36)
MCV RBC AUTO: 84 FL (ref 80–99)
METHGB BLD QL: 1.3 % (ref 0–1.5)
MODALITY: ABNORMAL
MONOCYTES # BLD AUTO: 0.33 10*3/MM3 (ref 0–1)
MONOCYTES NFR BLD AUTO: 2.7 % (ref 0–12)
NEUTROPHILS # BLD AUTO: 11.01 10*3/MM3 (ref 1.5–8.3)
NEUTROPHILS NFR BLD AUTO: 91.6 % (ref 41–71)
OXYHGB MFR BLDV: 87.3 % (ref 94–99)
PCO2 BLDA: 38.7 MM HG (ref 35–48)
PH BLDA: 7.4 PH UNITS (ref 7.35–7.45)
PHOSPHATE SERPL-MCNC: 4.7 MG/DL (ref 2.4–5.1)
PLATELET # BLD AUTO: 273 10*3/MM3 (ref 150–450)
PMV BLD AUTO: 9.4 FL (ref 6–12)
PO2 BLDA: 59.8 MM HG (ref 83–108)
POTASSIUM BLD-SCNC: 4.7 MMOL/L (ref 3.5–5.5)
POTASSIUM BLD-SCNC: 5.7 MMOL/L (ref 3.5–5.5)
POTASSIUM BLD-SCNC: 5.7 MMOL/L (ref 3.5–5.5)
PROT SERPL-MCNC: 6.4 G/DL (ref 5.7–8.2)
RBC # BLD AUTO: 3.63 10*6/MM3 (ref 4.2–5.76)
SODIUM BLD-SCNC: 134 MMOL/L (ref 132–146)
SODIUM BLD-SCNC: 134 MMOL/L (ref 132–146)
WBC NRBC COR # BLD: 12.02 10*3/MM3 (ref 3.5–10.8)

## 2017-08-26 PROCEDURE — 80053 COMPREHEN METABOLIC PANEL: CPT | Performed by: INTERNAL MEDICINE

## 2017-08-26 PROCEDURE — 82805 BLOOD GASES W/O2 SATURATION: CPT | Performed by: INTERNAL MEDICINE

## 2017-08-26 PROCEDURE — 99232 SBSQ HOSP IP/OBS MODERATE 35: CPT | Performed by: INTERNAL MEDICINE

## 2017-08-26 PROCEDURE — 36600 WITHDRAWAL OF ARTERIAL BLOOD: CPT | Performed by: INTERNAL MEDICINE

## 2017-08-26 PROCEDURE — 99024 POSTOP FOLLOW-UP VISIT: CPT | Performed by: THORACIC SURGERY (CARDIOTHORACIC VASCULAR SURGERY)

## 2017-08-26 PROCEDURE — 99233 SBSQ HOSP IP/OBS HIGH 50: CPT | Performed by: INTERNAL MEDICINE

## 2017-08-26 PROCEDURE — 25010000002 CEFUROXIME PER 750 MG: Performed by: PHYSICIAN ASSISTANT

## 2017-08-26 PROCEDURE — 71010 HC CHEST PA OR AP: CPT

## 2017-08-26 PROCEDURE — 84132 ASSAY OF SERUM POTASSIUM: CPT | Performed by: THORACIC SURGERY (CARDIOTHORACIC VASCULAR SURGERY)

## 2017-08-26 PROCEDURE — 63710000001 INSULIN DETEMIR PER 5 UNITS: Performed by: HOSPITALIST

## 2017-08-26 PROCEDURE — 25010000002 PROMETHAZINE PER 50 MG: Performed by: INTERNAL MEDICINE

## 2017-08-26 PROCEDURE — 63710000001 INSULIN LISPRO (HUMAN) PER 5 UNITS: Performed by: INTERNAL MEDICINE

## 2017-08-26 PROCEDURE — 82962 GLUCOSE BLOOD TEST: CPT

## 2017-08-26 PROCEDURE — 84100 ASSAY OF PHOSPHORUS: CPT | Performed by: INTERNAL MEDICINE

## 2017-08-26 PROCEDURE — 25010000002 HYDROMORPHONE PER 4 MG: Performed by: THORACIC SURGERY (CARDIOTHORACIC VASCULAR SURGERY)

## 2017-08-26 PROCEDURE — 25010000002 ONDANSETRON PER 1 MG: Performed by: PHYSICIAN ASSISTANT

## 2017-08-26 PROCEDURE — 83735 ASSAY OF MAGNESIUM: CPT | Performed by: INTERNAL MEDICINE

## 2017-08-26 PROCEDURE — 85025 COMPLETE CBC W/AUTO DIFF WBC: CPT | Performed by: PHYSICIAN ASSISTANT

## 2017-08-26 RX ORDER — BUMETANIDE 0.25 MG/ML
2.5 INJECTION INTRAMUSCULAR; INTRAVENOUS ONCE
Status: COMPLETED | OUTPATIENT
Start: 2017-08-26 | End: 2017-08-26

## 2017-08-26 RX ADMIN — Medication 2 TABLET: at 21:53

## 2017-08-26 RX ADMIN — METOPROLOL TARTRATE 12.5 MG: 25 TABLET, FILM COATED ORAL at 08:22

## 2017-08-26 RX ADMIN — CYCLOBENZAPRINE HYDROCHLORIDE 10 MG: 10 TABLET, FILM COATED ORAL at 08:20

## 2017-08-26 RX ADMIN — INSULIN DETEMIR 40 UNITS: 100 INJECTION, SOLUTION SUBCUTANEOUS at 22:30

## 2017-08-26 RX ADMIN — COLCHICINE 0.6 MG: 0.6 TABLET, FILM COATED ORAL at 09:00

## 2017-08-26 RX ADMIN — GUAIFENESIN 600 MG: 600 TABLET, EXTENDED RELEASE ORAL at 08:20

## 2017-08-26 RX ADMIN — Medication 2 TABLET: at 08:20

## 2017-08-26 RX ADMIN — INDOMETHACIN 75 MG: 75 CAPSULE, EXTENDED RELEASE ORAL at 17:03

## 2017-08-26 RX ADMIN — PROMETHAZINE HYDROCHLORIDE 6.25 MG: 25 INJECTION, SOLUTION INTRAMUSCULAR; INTRAVENOUS at 03:19

## 2017-08-26 RX ADMIN — Medication 10 ML: at 09:24

## 2017-08-26 RX ADMIN — ASPIRIN 325 MG: 325 TABLET, DELAYED RELEASE ORAL at 08:21

## 2017-08-26 RX ADMIN — HYDROMORPHONE HYDROCHLORIDE 0.5 MG: 1 INJECTION, SOLUTION INTRAMUSCULAR; INTRAVENOUS; SUBCUTANEOUS at 14:47

## 2017-08-26 RX ADMIN — INDOMETHACIN 75 MG: 75 CAPSULE, EXTENDED RELEASE ORAL at 08:22

## 2017-08-26 RX ADMIN — OXYCODONE AND ACETAMINOPHEN 2 TABLET: 5; 325 TABLET ORAL at 08:23

## 2017-08-26 RX ADMIN — TOPIRAMATE 100 MG: 100 TABLET, FILM COATED ORAL at 21:53

## 2017-08-26 RX ADMIN — POLYETHYLENE GLYCOL 3350 17 G: 17 POWDER, FOR SOLUTION ORAL at 08:22

## 2017-08-26 RX ADMIN — GABAPENTIN 300 MG: 300 CAPSULE ORAL at 21:53

## 2017-08-26 RX ADMIN — PANTOPRAZOLE SODIUM 40 MG: 40 TABLET, DELAYED RELEASE ORAL at 07:10

## 2017-08-26 RX ADMIN — ONDANSETRON 4 MG: 2 INJECTION INTRAMUSCULAR; INTRAVENOUS at 21:53

## 2017-08-26 RX ADMIN — INSULIN DETEMIR 40 UNITS: 100 INJECTION, SOLUTION SUBCUTANEOUS at 10:44

## 2017-08-26 RX ADMIN — ONDANSETRON 4 MG: 2 INJECTION INTRAMUSCULAR; INTRAVENOUS at 14:47

## 2017-08-26 RX ADMIN — OXYCODONE AND ACETAMINOPHEN 2 TABLET: 5; 325 TABLET ORAL at 21:54

## 2017-08-26 RX ADMIN — OXYCODONE AND ACETAMINOPHEN 2 TABLET: 5; 325 TABLET ORAL at 14:47

## 2017-08-26 RX ADMIN — BUMETANIDE 2.5 MG: 0.25 INJECTION INTRAMUSCULAR; INTRAVENOUS at 12:58

## 2017-08-26 RX ADMIN — AMLODIPINE BESYLATE 5 MG: 5 TABLET ORAL at 10:02

## 2017-08-26 RX ADMIN — Medication 10 ML: at 00:26

## 2017-08-26 RX ADMIN — HYDROMORPHONE HYDROCHLORIDE 0.5 MG: 1 INJECTION, SOLUTION INTRAMUSCULAR; INTRAVENOUS; SUBCUTANEOUS at 03:18

## 2017-08-26 RX ADMIN — GABAPENTIN 300 MG: 300 CAPSULE ORAL at 08:21

## 2017-08-26 RX ADMIN — INSULIN LISPRO 8 UNITS: 100 INJECTION, SOLUTION INTRAVENOUS; SUBCUTANEOUS at 17:04

## 2017-08-26 RX ADMIN — INSULIN LISPRO 16 UNITS: 100 INJECTION, SOLUTION INTRAVENOUS; SUBCUTANEOUS at 09:19

## 2017-08-26 RX ADMIN — Medication 10 ML: at 00:27

## 2017-08-26 RX ADMIN — INSULIN LISPRO 12 UNITS: 100 INJECTION, SOLUTION INTRAVENOUS; SUBCUTANEOUS at 11:55

## 2017-08-26 RX ADMIN — ATORVASTATIN CALCIUM 40 MG: 40 TABLET, FILM COATED ORAL at 21:54

## 2017-08-26 RX ADMIN — ONDANSETRON 4 MG: 2 INJECTION INTRAMUSCULAR; INTRAVENOUS at 08:24

## 2017-08-26 RX ADMIN — CYCLOBENZAPRINE HYDROCHLORIDE 10 MG: 10 TABLET, FILM COATED ORAL at 17:03

## 2017-08-26 RX ADMIN — OXYCODONE AND ACETAMINOPHEN 2 TABLET: 5; 325 TABLET ORAL at 03:18

## 2017-08-26 RX ADMIN — CETIRIZINE HYDROCHLORIDE 10 MG: 10 TABLET, FILM COATED ORAL at 08:20

## 2017-08-26 RX ADMIN — INSULIN LISPRO 8 UNITS: 100 INJECTION, SOLUTION INTRAVENOUS; SUBCUTANEOUS at 22:31

## 2017-08-26 RX ADMIN — METOPROLOL TARTRATE 12.5 MG: 25 TABLET, FILM COATED ORAL at 21:53

## 2017-08-26 RX ADMIN — HYDROMORPHONE HYDROCHLORIDE 0.5 MG: 1 INJECTION, SOLUTION INTRAMUSCULAR; INTRAVENOUS; SUBCUTANEOUS at 08:23

## 2017-08-26 RX ADMIN — CYCLOBENZAPRINE HYDROCHLORIDE 10 MG: 10 TABLET, FILM COATED ORAL at 21:54

## 2017-08-26 RX ADMIN — CEFUROXIME 1.5 G: 1.5 INJECTION, SOLUTION INTRAVENOUS at 08:31

## 2017-08-27 LAB
ANION GAP SERPL CALCULATED.3IONS-SCNC: 5 MMOL/L (ref 3–11)
BASOPHILS # BLD AUTO: 0.03 10*3/MM3 (ref 0–0.2)
BASOPHILS NFR BLD AUTO: 0.3 % (ref 0–1)
BUN BLD-MCNC: 23 MG/DL (ref 9–23)
BUN/CREAT SERPL: 23 (ref 7–25)
CALCIUM SPEC-SCNC: 8.8 MG/DL (ref 8.7–10.4)
CHLORIDE SERPL-SCNC: 100 MMOL/L (ref 99–109)
CO2 SERPL-SCNC: 26 MMOL/L (ref 20–31)
CREAT BLD-MCNC: 1 MG/DL (ref 0.6–1.3)
DEPRECATED RDW RBC AUTO: 40.4 FL (ref 37–54)
EOSINOPHIL # BLD AUTO: 0.42 10*3/MM3 (ref 0–0.3)
EOSINOPHIL NFR BLD AUTO: 4.5 % (ref 0–3)
ERYTHROCYTE [DISTWIDTH] IN BLOOD BY AUTOMATED COUNT: 13.3 % (ref 11.3–14.5)
GFR SERPL CREATININE-BSD FRML MDRD: 77 ML/MIN/1.73
GLUCOSE BLD-MCNC: 143 MG/DL (ref 70–100)
GLUCOSE BLDC GLUCOMTR-MCNC: 170 MG/DL (ref 70–130)
GLUCOSE BLDC GLUCOMTR-MCNC: 250 MG/DL (ref 70–130)
GLUCOSE BLDC GLUCOMTR-MCNC: 259 MG/DL (ref 70–130)
GLUCOSE BLDC GLUCOMTR-MCNC: 279 MG/DL (ref 70–130)
HCT VFR BLD AUTO: 26.2 % (ref 38.9–50.9)
HGB BLD-MCNC: 8.6 G/DL (ref 13.1–17.5)
IMM GRANULOCYTES # BLD: 0.09 10*3/MM3 (ref 0–0.03)
IMM GRANULOCYTES NFR BLD: 1 % (ref 0–0.6)
LYMPHOCYTES # BLD AUTO: 1.54 10*3/MM3 (ref 0.6–4.8)
LYMPHOCYTES NFR BLD AUTO: 16.4 % (ref 24–44)
MCH RBC QN AUTO: 27.2 PG (ref 27–31)
MCHC RBC AUTO-ENTMCNC: 32.8 G/DL (ref 32–36)
MCV RBC AUTO: 82.9 FL (ref 80–99)
MONOCYTES # BLD AUTO: 0.64 10*3/MM3 (ref 0–1)
MONOCYTES NFR BLD AUTO: 6.8 % (ref 0–12)
NEUTROPHILS # BLD AUTO: 6.66 10*3/MM3 (ref 1.5–8.3)
NEUTROPHILS NFR BLD AUTO: 71 % (ref 41–71)
PLATELET # BLD AUTO: 240 10*3/MM3 (ref 150–450)
PMV BLD AUTO: 9 FL (ref 6–12)
POTASSIUM BLD-SCNC: 4.5 MMOL/L (ref 3.5–5.5)
RBC # BLD AUTO: 3.16 10*6/MM3 (ref 4.2–5.76)
SODIUM BLD-SCNC: 131 MMOL/L (ref 132–146)
WBC NRBC COR # BLD: 9.38 10*3/MM3 (ref 3.5–10.8)

## 2017-08-27 PROCEDURE — 25010000002 ONDANSETRON PER 1 MG: Performed by: PHYSICIAN ASSISTANT

## 2017-08-27 PROCEDURE — 82962 GLUCOSE BLOOD TEST: CPT

## 2017-08-27 PROCEDURE — 99024 POSTOP FOLLOW-UP VISIT: CPT | Performed by: THORACIC SURGERY (CARDIOTHORACIC VASCULAR SURGERY)

## 2017-08-27 PROCEDURE — 99233 SBSQ HOSP IP/OBS HIGH 50: CPT | Performed by: INTERNAL MEDICINE

## 2017-08-27 PROCEDURE — 99232 SBSQ HOSP IP/OBS MODERATE 35: CPT | Performed by: INTERNAL MEDICINE

## 2017-08-27 PROCEDURE — 85025 COMPLETE CBC W/AUTO DIFF WBC: CPT | Performed by: INTERNAL MEDICINE

## 2017-08-27 PROCEDURE — 63710000001 INSULIN DETEMIR PER 5 UNITS: Performed by: HOSPITALIST

## 2017-08-27 PROCEDURE — 80048 BASIC METABOLIC PNL TOTAL CA: CPT | Performed by: INTERNAL MEDICINE

## 2017-08-27 PROCEDURE — 25010000002 HYDROMORPHONE PER 4 MG: Performed by: THORACIC SURGERY (CARDIOTHORACIC VASCULAR SURGERY)

## 2017-08-27 PROCEDURE — 63710000001 INSULIN DETEMIR PER 5 UNITS: Performed by: INTERNAL MEDICINE

## 2017-08-27 RX ORDER — DOCUSATE SODIUM 100 MG/1
100 CAPSULE, LIQUID FILLED ORAL 2 TIMES DAILY
Status: DISCONTINUED | OUTPATIENT
Start: 2017-08-27 | End: 2017-09-05 | Stop reason: HOSPADM

## 2017-08-27 RX ADMIN — CYCLOBENZAPRINE HYDROCHLORIDE 10 MG: 10 TABLET, FILM COATED ORAL at 09:01

## 2017-08-27 RX ADMIN — INSULIN DETEMIR 40 UNITS: 100 INJECTION, SOLUTION SUBCUTANEOUS at 09:07

## 2017-08-27 RX ADMIN — GABAPENTIN 300 MG: 300 CAPSULE ORAL at 09:01

## 2017-08-27 RX ADMIN — HYDROMORPHONE HYDROCHLORIDE 0.5 MG: 1 INJECTION, SOLUTION INTRAMUSCULAR; INTRAVENOUS; SUBCUTANEOUS at 09:02

## 2017-08-27 RX ADMIN — OXYCODONE AND ACETAMINOPHEN 2 TABLET: 5; 325 TABLET ORAL at 20:10

## 2017-08-27 RX ADMIN — ONDANSETRON 4 MG: 2 INJECTION INTRAMUSCULAR; INTRAVENOUS at 12:12

## 2017-08-27 RX ADMIN — METOPROLOL TARTRATE 12.5 MG: 25 TABLET, FILM COATED ORAL at 20:09

## 2017-08-27 RX ADMIN — GABAPENTIN 300 MG: 300 CAPSULE ORAL at 20:10

## 2017-08-27 RX ADMIN — DOCUSATE SODIUM 100 MG: 100 CAPSULE, LIQUID FILLED ORAL at 10:17

## 2017-08-27 RX ADMIN — OXYCODONE AND ACETAMINOPHEN 2 TABLET: 5; 325 TABLET ORAL at 12:11

## 2017-08-27 RX ADMIN — ATORVASTATIN CALCIUM 40 MG: 40 TABLET, FILM COATED ORAL at 20:10

## 2017-08-27 RX ADMIN — INSULIN DETEMIR 45 UNITS: 100 INJECTION, SOLUTION SUBCUTANEOUS at 20:24

## 2017-08-27 RX ADMIN — INSULIN LISPRO 12 UNITS: 100 INJECTION, SOLUTION INTRAVENOUS; SUBCUTANEOUS at 17:29

## 2017-08-27 RX ADMIN — INSULIN LISPRO 30 UNITS: 100 INJECTION, SOLUTION INTRAVENOUS; SUBCUTANEOUS at 17:29

## 2017-08-27 RX ADMIN — INDOMETHACIN 75 MG: 75 CAPSULE, EXTENDED RELEASE ORAL at 16:48

## 2017-08-27 RX ADMIN — Medication 2 TABLET: at 20:10

## 2017-08-27 RX ADMIN — Medication 10 ML: at 09:04

## 2017-08-27 RX ADMIN — CYCLOBENZAPRINE HYDROCHLORIDE 10 MG: 10 TABLET, FILM COATED ORAL at 15:56

## 2017-08-27 RX ADMIN — OXYCODONE AND ACETAMINOPHEN 2 TABLET: 5; 325 TABLET ORAL at 06:13

## 2017-08-27 RX ADMIN — CETIRIZINE HYDROCHLORIDE 10 MG: 10 TABLET, FILM COATED ORAL at 09:01

## 2017-08-27 RX ADMIN — CYCLOBENZAPRINE HYDROCHLORIDE 10 MG: 10 TABLET, FILM COATED ORAL at 20:10

## 2017-08-27 RX ADMIN — DOCUSATE SODIUM 100 MG: 100 CAPSULE, LIQUID FILLED ORAL at 16:48

## 2017-08-27 RX ADMIN — COLCHICINE 0.6 MG: 0.6 TABLET, FILM COATED ORAL at 09:01

## 2017-08-27 RX ADMIN — INDOMETHACIN 75 MG: 75 CAPSULE, EXTENDED RELEASE ORAL at 09:07

## 2017-08-27 RX ADMIN — TOPIRAMATE 100 MG: 100 TABLET, FILM COATED ORAL at 20:09

## 2017-08-27 RX ADMIN — INSULIN LISPRO 12 UNITS: 100 INJECTION, SOLUTION INTRAVENOUS; SUBCUTANEOUS at 20:08

## 2017-08-27 RX ADMIN — AMLODIPINE BESYLATE 5 MG: 5 TABLET ORAL at 09:01

## 2017-08-27 RX ADMIN — HYDROMORPHONE HYDROCHLORIDE 0.5 MG: 1 INJECTION, SOLUTION INTRAMUSCULAR; INTRAVENOUS; SUBCUTANEOUS at 13:04

## 2017-08-27 RX ADMIN — INSULIN LISPRO 4 UNITS: 100 INJECTION, SOLUTION INTRAVENOUS; SUBCUTANEOUS at 08:49

## 2017-08-27 RX ADMIN — Medication 10 ML: at 03:22

## 2017-08-27 RX ADMIN — INSULIN LISPRO 12 UNITS: 100 INJECTION, SOLUTION INTRAVENOUS; SUBCUTANEOUS at 12:04

## 2017-08-27 RX ADMIN — POLYETHYLENE GLYCOL 3350 17 G: 17 POWDER, FOR SOLUTION ORAL at 09:01

## 2017-08-27 RX ADMIN — Medication 10 ML: at 16:49

## 2017-08-27 RX ADMIN — ASPIRIN 325 MG: 325 TABLET, DELAYED RELEASE ORAL at 09:01

## 2017-08-27 RX ADMIN — METOPROLOL TARTRATE 12.5 MG: 25 TABLET, FILM COATED ORAL at 09:01

## 2017-08-27 RX ADMIN — ONDANSETRON 4 MG: 2 INJECTION INTRAMUSCULAR; INTRAVENOUS at 06:13

## 2017-08-27 RX ADMIN — PANTOPRAZOLE SODIUM 40 MG: 40 TABLET, DELAYED RELEASE ORAL at 06:07

## 2017-08-28 ENCOUNTER — APPOINTMENT (OUTPATIENT)
Dept: GENERAL RADIOLOGY | Facility: HOSPITAL | Age: 57
End: 2017-08-28

## 2017-08-28 LAB
CYTO UR: NORMAL
GLUCOSE BLDC GLUCOMTR-MCNC: 125 MG/DL (ref 70–130)
GLUCOSE BLDC GLUCOMTR-MCNC: 140 MG/DL (ref 70–130)
GLUCOSE BLDC GLUCOMTR-MCNC: 150 MG/DL (ref 70–130)
GLUCOSE BLDC GLUCOMTR-MCNC: 183 MG/DL (ref 70–130)
LAB AP CASE REPORT: NORMAL
LAB AP CLINICAL INFORMATION: NORMAL
Lab: NORMAL
PATH REPORT.FINAL DX SPEC: NORMAL
PATH REPORT.GROSS SPEC: NORMAL

## 2017-08-28 PROCEDURE — 71010 HC CHEST PA OR AP: CPT

## 2017-08-28 PROCEDURE — 99232 SBSQ HOSP IP/OBS MODERATE 35: CPT | Performed by: INTERNAL MEDICINE

## 2017-08-28 PROCEDURE — 25010000002 ALTEPLASE 2 MG RECONSTITUTED SOLUTION: Performed by: INTERNAL MEDICINE

## 2017-08-28 PROCEDURE — 99024 POSTOP FOLLOW-UP VISIT: CPT | Performed by: THORACIC SURGERY (CARDIOTHORACIC VASCULAR SURGERY)

## 2017-08-28 PROCEDURE — 63710000001 INSULIN DETEMIR PER 5 UNITS: Performed by: INTERNAL MEDICINE

## 2017-08-28 PROCEDURE — 97164 PT RE-EVAL EST PLAN CARE: CPT

## 2017-08-28 PROCEDURE — 97110 THERAPEUTIC EXERCISES: CPT

## 2017-08-28 PROCEDURE — 82962 GLUCOSE BLOOD TEST: CPT

## 2017-08-28 PROCEDURE — 25010000002 HYDROMORPHONE PER 4 MG: Performed by: PHYSICIAN ASSISTANT

## 2017-08-28 PROCEDURE — 99233 SBSQ HOSP IP/OBS HIGH 50: CPT | Performed by: INTERNAL MEDICINE

## 2017-08-28 PROCEDURE — 94799 UNLISTED PULMONARY SVC/PX: CPT

## 2017-08-28 RX ORDER — HYDROMORPHONE HYDROCHLORIDE 1 MG/ML
0.5 INJECTION, SOLUTION INTRAMUSCULAR; INTRAVENOUS; SUBCUTANEOUS
Status: DISCONTINUED | OUTPATIENT
Start: 2017-08-28 | End: 2017-09-05 | Stop reason: HOSPADM

## 2017-08-28 RX ORDER — WATER 1000 ML/1000ML
INJECTION, SOLUTION INTRAVENOUS
Status: COMPLETED
Start: 2017-08-28 | End: 2017-08-28

## 2017-08-28 RX ADMIN — HYDROMORPHONE HYDROCHLORIDE 0.5 MG: 1 INJECTION, SOLUTION INTRAMUSCULAR; INTRAVENOUS; SUBCUTANEOUS at 22:05

## 2017-08-28 RX ADMIN — Medication 2 TABLET: at 20:00

## 2017-08-28 RX ADMIN — INSULIN DETEMIR 45 UNITS: 100 INJECTION, SOLUTION SUBCUTANEOUS at 22:01

## 2017-08-28 RX ADMIN — DOCUSATE SODIUM 100 MG: 100 CAPSULE, LIQUID FILLED ORAL at 18:43

## 2017-08-28 RX ADMIN — INSULIN LISPRO 4 UNITS: 100 INJECTION, SOLUTION INTRAVENOUS; SUBCUTANEOUS at 11:31

## 2017-08-28 RX ADMIN — PANTOPRAZOLE SODIUM 40 MG: 40 TABLET, DELAYED RELEASE ORAL at 05:58

## 2017-08-28 RX ADMIN — INSULIN LISPRO 30 UNITS: 100 INJECTION, SOLUTION INTRAVENOUS; SUBCUTANEOUS at 11:33

## 2017-08-28 RX ADMIN — CYCLOBENZAPRINE HYDROCHLORIDE 10 MG: 10 TABLET, FILM COATED ORAL at 08:26

## 2017-08-28 RX ADMIN — Medication 10 ML: at 20:02

## 2017-08-28 RX ADMIN — METOPROLOL TARTRATE 12.5 MG: 25 TABLET, FILM COATED ORAL at 20:00

## 2017-08-28 RX ADMIN — CETIRIZINE HYDROCHLORIDE 10 MG: 10 TABLET, FILM COATED ORAL at 08:27

## 2017-08-28 RX ADMIN — TOPIRAMATE 100 MG: 100 TABLET, FILM COATED ORAL at 20:00

## 2017-08-28 RX ADMIN — INSULIN LISPRO 4 UNITS: 100 INJECTION, SOLUTION INTRAVENOUS; SUBCUTANEOUS at 08:28

## 2017-08-28 RX ADMIN — COLCHICINE 0.6 MG: 0.6 TABLET, FILM COATED ORAL at 08:26

## 2017-08-28 RX ADMIN — HYDROMORPHONE HYDROCHLORIDE 0.5 MG: 1 INJECTION, SOLUTION INTRAMUSCULAR; INTRAVENOUS; SUBCUTANEOUS at 18:44

## 2017-08-28 RX ADMIN — OXYCODONE AND ACETAMINOPHEN 2 TABLET: 5; 325 TABLET ORAL at 18:44

## 2017-08-28 RX ADMIN — ALTEPLASE 2 MG: 2.2 INJECTION, POWDER, LYOPHILIZED, FOR SOLUTION INTRAVENOUS at 13:14

## 2017-08-28 RX ADMIN — HYDROMORPHONE HYDROCHLORIDE 0.5 MG: 1 INJECTION, SOLUTION INTRAMUSCULAR; INTRAVENOUS; SUBCUTANEOUS at 13:14

## 2017-08-28 RX ADMIN — Medication 10 ML: at 01:40

## 2017-08-28 RX ADMIN — ASPIRIN 325 MG: 325 TABLET, DELAYED RELEASE ORAL at 08:26

## 2017-08-28 RX ADMIN — WATER 10 ML: 1 INJECTION INTRAMUSCULAR; INTRAVENOUS; SUBCUTANEOUS at 13:14

## 2017-08-28 RX ADMIN — INDOMETHACIN 75 MG: 75 CAPSULE, EXTENDED RELEASE ORAL at 18:44

## 2017-08-28 RX ADMIN — HYDROMORPHONE HYDROCHLORIDE 0.5 MG: 1 INJECTION, SOLUTION INTRAMUSCULAR; INTRAVENOUS; SUBCUTANEOUS at 19:57

## 2017-08-28 RX ADMIN — GABAPENTIN 300 MG: 300 CAPSULE ORAL at 08:26

## 2017-08-28 RX ADMIN — INDOMETHACIN 75 MG: 75 CAPSULE, EXTENDED RELEASE ORAL at 08:26

## 2017-08-28 RX ADMIN — OXYCODONE AND ACETAMINOPHEN 2 TABLET: 5; 325 TABLET ORAL at 13:14

## 2017-08-28 RX ADMIN — OXYCODONE AND ACETAMINOPHEN 2 TABLET: 5; 325 TABLET ORAL at 08:27

## 2017-08-28 RX ADMIN — ATORVASTATIN CALCIUM 40 MG: 40 TABLET, FILM COATED ORAL at 20:00

## 2017-08-28 RX ADMIN — INSULIN LISPRO 30 UNITS: 100 INJECTION, SOLUTION INTRAVENOUS; SUBCUTANEOUS at 16:30

## 2017-08-28 RX ADMIN — GABAPENTIN 300 MG: 300 CAPSULE ORAL at 20:00

## 2017-08-28 RX ADMIN — ALTEPLASE 2 MG: 2.2 INJECTION, POWDER, LYOPHILIZED, FOR SOLUTION INTRAVENOUS at 11:19

## 2017-08-28 RX ADMIN — INSULIN DETEMIR 45 UNITS: 100 INJECTION, SOLUTION SUBCUTANEOUS at 08:34

## 2017-08-28 RX ADMIN — CYCLOBENZAPRINE HYDROCHLORIDE 10 MG: 10 TABLET, FILM COATED ORAL at 20:00

## 2017-08-28 RX ADMIN — DOCUSATE SODIUM 100 MG: 100 CAPSULE, LIQUID FILLED ORAL at 08:26

## 2017-08-28 RX ADMIN — INSULIN LISPRO 30 UNITS: 100 INJECTION, SOLUTION INTRAVENOUS; SUBCUTANEOUS at 08:28

## 2017-08-28 RX ADMIN — POLYETHYLENE GLYCOL 3350 17 G: 17 POWDER, FOR SOLUTION ORAL at 08:26

## 2017-08-28 RX ADMIN — CYCLOBENZAPRINE HYDROCHLORIDE 10 MG: 10 TABLET, FILM COATED ORAL at 16:27

## 2017-08-29 ENCOUNTER — APPOINTMENT (OUTPATIENT)
Dept: GENERAL RADIOLOGY | Facility: HOSPITAL | Age: 57
End: 2017-08-29

## 2017-08-29 LAB
ABO + RH BLD: NORMAL
ABO + RH BLD: NORMAL
ANION GAP SERPL CALCULATED.3IONS-SCNC: 7 MMOL/L (ref 3–11)
BACTERIA FLD CULT: NORMAL
BASOPHILS # BLD AUTO: 0.06 10*3/MM3 (ref 0–0.2)
BASOPHILS NFR BLD AUTO: 0.6 % (ref 0–1)
BH BB BLOOD EXPIRATION DATE: NORMAL
BH BB BLOOD EXPIRATION DATE: NORMAL
BH BB BLOOD TYPE BARCODE: 6200
BH BB BLOOD TYPE BARCODE: 6200
BH BB DISPENSE STATUS: NORMAL
BH BB DISPENSE STATUS: NORMAL
BH BB PRODUCT CODE: NORMAL
BH BB PRODUCT CODE: NORMAL
BH BB UNIT NUMBER: NORMAL
BH BB UNIT NUMBER: NORMAL
BUN BLD-MCNC: 34 MG/DL (ref 9–23)
BUN/CREAT SERPL: 34 (ref 7–25)
CALCIUM SPEC-SCNC: 8.8 MG/DL (ref 8.7–10.4)
CHLORIDE SERPL-SCNC: 97 MMOL/L (ref 99–109)
CO2 SERPL-SCNC: 28 MMOL/L (ref 20–31)
CREAT BLD-MCNC: 1 MG/DL (ref 0.6–1.3)
DEPRECATED RDW RBC AUTO: 39.8 FL (ref 37–54)
EOSINOPHIL # BLD AUTO: 0.54 10*3/MM3 (ref 0–0.3)
EOSINOPHIL NFR BLD AUTO: 5.4 % (ref 0–3)
ERYTHROCYTE [DISTWIDTH] IN BLOOD BY AUTOMATED COUNT: 13.3 % (ref 11.3–14.5)
GFR SERPL CREATININE-BSD FRML MDRD: 77 ML/MIN/1.73
GLUCOSE BLD-MCNC: 100 MG/DL (ref 70–100)
GLUCOSE BLDC GLUCOMTR-MCNC: 114 MG/DL (ref 70–130)
GLUCOSE BLDC GLUCOMTR-MCNC: 150 MG/DL (ref 70–130)
GLUCOSE BLDC GLUCOMTR-MCNC: 200 MG/DL (ref 70–130)
GLUCOSE BLDC GLUCOMTR-MCNC: 92 MG/DL (ref 70–130)
GRAM STN SPEC: NORMAL
GRAM STN SPEC: NORMAL
HCT VFR BLD AUTO: 27.1 % (ref 38.9–50.9)
HGB BLD-MCNC: 9 G/DL (ref 13.1–17.5)
IMM GRANULOCYTES # BLD: 0.14 10*3/MM3 (ref 0–0.03)
IMM GRANULOCYTES NFR BLD: 1.4 % (ref 0–0.6)
LYMPHOCYTES # BLD AUTO: 1.86 10*3/MM3 (ref 0.6–4.8)
LYMPHOCYTES NFR BLD AUTO: 18.8 % (ref 24–44)
MCH RBC QN AUTO: 27.2 PG (ref 27–31)
MCHC RBC AUTO-ENTMCNC: 33.2 G/DL (ref 32–36)
MCV RBC AUTO: 81.9 FL (ref 80–99)
MONOCYTES # BLD AUTO: 0.64 10*3/MM3 (ref 0–1)
MONOCYTES NFR BLD AUTO: 6.5 % (ref 0–12)
NEUTROPHILS # BLD AUTO: 6.68 10*3/MM3 (ref 1.5–8.3)
NEUTROPHILS NFR BLD AUTO: 67.3 % (ref 41–71)
PLAT MORPH BLD: NORMAL
PLATELET # BLD AUTO: 250 10*3/MM3 (ref 150–450)
PMV BLD AUTO: 8.9 FL (ref 6–12)
POTASSIUM BLD-SCNC: 4.5 MMOL/L (ref 3.5–5.5)
RBC # BLD AUTO: 3.31 10*6/MM3 (ref 4.2–5.76)
RBC MORPH BLD: NORMAL
SODIUM BLD-SCNC: 132 MMOL/L (ref 132–146)
UNIT  ABO: NORMAL
UNIT  ABO: NORMAL
UNIT  RH: NORMAL
UNIT  RH: NORMAL
WBC MORPH BLD: NORMAL
WBC NRBC COR # BLD: 9.92 10*3/MM3 (ref 3.5–10.8)

## 2017-08-29 PROCEDURE — 85007 BL SMEAR W/DIFF WBC COUNT: CPT | Performed by: INTERNAL MEDICINE

## 2017-08-29 PROCEDURE — 94799 UNLISTED PULMONARY SVC/PX: CPT

## 2017-08-29 PROCEDURE — 85025 COMPLETE CBC W/AUTO DIFF WBC: CPT | Performed by: INTERNAL MEDICINE

## 2017-08-29 PROCEDURE — 63710000001 INSULIN DETEMIR PER 5 UNITS: Performed by: INTERNAL MEDICINE

## 2017-08-29 PROCEDURE — 82962 GLUCOSE BLOOD TEST: CPT

## 2017-08-29 PROCEDURE — 99232 SBSQ HOSP IP/OBS MODERATE 35: CPT | Performed by: INTERNAL MEDICINE

## 2017-08-29 PROCEDURE — 63710000001 INSULIN LISPRO (HUMAN) PER 5 UNITS: Performed by: INTERNAL MEDICINE

## 2017-08-29 PROCEDURE — 71010 HC CHEST PA OR AP: CPT

## 2017-08-29 PROCEDURE — 25010000002 HYDROMORPHONE PER 4 MG: Performed by: PHYSICIAN ASSISTANT

## 2017-08-29 PROCEDURE — 99024 POSTOP FOLLOW-UP VISIT: CPT | Performed by: THORACIC SURGERY (CARDIOTHORACIC VASCULAR SURGERY)

## 2017-08-29 PROCEDURE — 97110 THERAPEUTIC EXERCISES: CPT

## 2017-08-29 PROCEDURE — 80048 BASIC METABOLIC PNL TOTAL CA: CPT | Performed by: INTERNAL MEDICINE

## 2017-08-29 RX ADMIN — OXYCODONE AND ACETAMINOPHEN 2 TABLET: 5; 325 TABLET ORAL at 19:51

## 2017-08-29 RX ADMIN — INDOMETHACIN 75 MG: 75 CAPSULE, EXTENDED RELEASE ORAL at 19:50

## 2017-08-29 RX ADMIN — INSULIN LISPRO 30 UNITS: 100 INJECTION, SOLUTION INTRAVENOUS; SUBCUTANEOUS at 11:25

## 2017-08-29 RX ADMIN — GABAPENTIN 300 MG: 300 CAPSULE ORAL at 20:14

## 2017-08-29 RX ADMIN — ASPIRIN 325 MG: 325 TABLET, DELAYED RELEASE ORAL at 09:05

## 2017-08-29 RX ADMIN — POLYETHYLENE GLYCOL 3350 17 G: 17 POWDER, FOR SOLUTION ORAL at 09:05

## 2017-08-29 RX ADMIN — DOCUSATE SODIUM 100 MG: 100 CAPSULE, LIQUID FILLED ORAL at 09:04

## 2017-08-29 RX ADMIN — INDOMETHACIN 75 MG: 75 CAPSULE, EXTENDED RELEASE ORAL at 09:03

## 2017-08-29 RX ADMIN — CYCLOBENZAPRINE HYDROCHLORIDE 10 MG: 10 TABLET, FILM COATED ORAL at 20:14

## 2017-08-29 RX ADMIN — HYDROMORPHONE HYDROCHLORIDE 0.5 MG: 1 INJECTION, SOLUTION INTRAMUSCULAR; INTRAVENOUS; SUBCUTANEOUS at 23:17

## 2017-08-29 RX ADMIN — INSULIN LISPRO 4 UNITS: 100 INJECTION, SOLUTION INTRAVENOUS; SUBCUTANEOUS at 17:22

## 2017-08-29 RX ADMIN — HYDROMORPHONE HYDROCHLORIDE 0.5 MG: 1 INJECTION, SOLUTION INTRAMUSCULAR; INTRAVENOUS; SUBCUTANEOUS at 19:13

## 2017-08-29 RX ADMIN — ATORVASTATIN CALCIUM 40 MG: 40 TABLET, FILM COATED ORAL at 20:14

## 2017-08-29 RX ADMIN — METOPROLOL TARTRATE 12.5 MG: 25 TABLET, FILM COATED ORAL at 20:14

## 2017-08-29 RX ADMIN — OXYCODONE AND ACETAMINOPHEN 2 TABLET: 5; 325 TABLET ORAL at 15:41

## 2017-08-29 RX ADMIN — AMLODIPINE BESYLATE 5 MG: 5 TABLET ORAL at 09:04

## 2017-08-29 RX ADMIN — GABAPENTIN 300 MG: 300 CAPSULE ORAL at 09:04

## 2017-08-29 RX ADMIN — OXYCODONE AND ACETAMINOPHEN 2 TABLET: 5; 325 TABLET ORAL at 09:05

## 2017-08-29 RX ADMIN — INSULIN LISPRO 30 UNITS: 100 INJECTION, SOLUTION INTRAVENOUS; SUBCUTANEOUS at 17:23

## 2017-08-29 RX ADMIN — DOCUSATE SODIUM 100 MG: 100 CAPSULE, LIQUID FILLED ORAL at 17:22

## 2017-08-29 RX ADMIN — PANTOPRAZOLE SODIUM 40 MG: 40 TABLET, DELAYED RELEASE ORAL at 07:31

## 2017-08-29 RX ADMIN — METOPROLOL TARTRATE 12.5 MG: 25 TABLET, FILM COATED ORAL at 09:04

## 2017-08-29 RX ADMIN — Medication 10 ML: at 02:51

## 2017-08-29 RX ADMIN — CYCLOBENZAPRINE HYDROCHLORIDE 10 MG: 10 TABLET, FILM COATED ORAL at 09:04

## 2017-08-29 RX ADMIN — INSULIN DETEMIR 45 UNITS: 100 INJECTION, SOLUTION SUBCUTANEOUS at 09:06

## 2017-08-29 RX ADMIN — CETIRIZINE HYDROCHLORIDE 10 MG: 10 TABLET, FILM COATED ORAL at 09:05

## 2017-08-29 RX ADMIN — OXYCODONE AND ACETAMINOPHEN 2 TABLET: 5; 325 TABLET ORAL at 00:11

## 2017-08-29 RX ADMIN — OXYCODONE AND ACETAMINOPHEN 2 TABLET: 5; 325 TABLET ORAL at 04:13

## 2017-08-29 RX ADMIN — HYDROMORPHONE HYDROCHLORIDE 0.5 MG: 1 INJECTION, SOLUTION INTRAMUSCULAR; INTRAVENOUS; SUBCUTANEOUS at 04:59

## 2017-08-29 RX ADMIN — INSULIN LISPRO 8 UNITS: 100 INJECTION, SOLUTION INTRAVENOUS; SUBCUTANEOUS at 11:24

## 2017-08-29 RX ADMIN — Medication 10 ML: at 17:22

## 2017-08-29 RX ADMIN — CYCLOBENZAPRINE HYDROCHLORIDE 10 MG: 10 TABLET, FILM COATED ORAL at 15:41

## 2017-08-29 RX ADMIN — Medication 2 TABLET: at 20:14

## 2017-08-29 RX ADMIN — COLCHICINE 0.6 MG: 0.6 TABLET, FILM COATED ORAL at 09:04

## 2017-08-29 RX ADMIN — TOPIRAMATE 100 MG: 100 TABLET, FILM COATED ORAL at 20:14

## 2017-08-29 RX ADMIN — Medication 10 ML: at 09:11

## 2017-08-29 RX ADMIN — INSULIN DETEMIR 45 UNITS: 100 INJECTION, SOLUTION SUBCUTANEOUS at 20:03

## 2017-08-30 ENCOUNTER — APPOINTMENT (OUTPATIENT)
Dept: GENERAL RADIOLOGY | Facility: HOSPITAL | Age: 57
End: 2017-08-30

## 2017-08-30 PROBLEM — I24.1: Status: ACTIVE | Noted: 2017-08-30

## 2017-08-30 LAB
GLUCOSE BLDC GLUCOMTR-MCNC: 127 MG/DL (ref 70–130)
GLUCOSE BLDC GLUCOMTR-MCNC: 129 MG/DL (ref 70–130)
GLUCOSE BLDC GLUCOMTR-MCNC: 92 MG/DL (ref 70–130)
GLUCOSE BLDC GLUCOMTR-MCNC: 96 MG/DL (ref 70–130)

## 2017-08-30 PROCEDURE — 82962 GLUCOSE BLOOD TEST: CPT

## 2017-08-30 PROCEDURE — 97110 THERAPEUTIC EXERCISES: CPT

## 2017-08-30 PROCEDURE — 63710000001 INSULIN DETEMIR PER 5 UNITS: Performed by: INTERNAL MEDICINE

## 2017-08-30 PROCEDURE — 71010 HC CHEST PA OR AP: CPT

## 2017-08-30 PROCEDURE — 25010000002 ONDANSETRON PER 1 MG: Performed by: PHYSICIAN ASSISTANT

## 2017-08-30 PROCEDURE — 99024 POSTOP FOLLOW-UP VISIT: CPT | Performed by: THORACIC SURGERY (CARDIOTHORACIC VASCULAR SURGERY)

## 2017-08-30 PROCEDURE — 25010000002 HYDROMORPHONE PER 4 MG: Performed by: PHYSICIAN ASSISTANT

## 2017-08-30 PROCEDURE — 99232 SBSQ HOSP IP/OBS MODERATE 35: CPT | Performed by: INTERNAL MEDICINE

## 2017-08-30 RX ORDER — POTASSIUM CHLORIDE 750 MG/1
20 CAPSULE, EXTENDED RELEASE ORAL DAILY
Status: DISCONTINUED | OUTPATIENT
Start: 2017-08-30 | End: 2017-09-05 | Stop reason: HOSPADM

## 2017-08-30 RX ORDER — FUROSEMIDE 40 MG/1
40 TABLET ORAL DAILY
Status: DISCONTINUED | OUTPATIENT
Start: 2017-08-30 | End: 2017-09-01

## 2017-08-30 RX ORDER — SODIUM PHOSPHATE, DIBASIC AND SODIUM PHOSPHATE, MONOBASIC 7; 19 G/133ML; G/133ML
1 ENEMA RECTAL ONCE
Status: DISCONTINUED | OUTPATIENT
Start: 2017-08-30 | End: 2017-09-05 | Stop reason: HOSPADM

## 2017-08-30 RX ORDER — FUROSEMIDE 10 MG/ML
40 INJECTION INTRAMUSCULAR; INTRAVENOUS ONCE
Status: DISCONTINUED | OUTPATIENT
Start: 2017-08-30 | End: 2017-09-01

## 2017-08-30 RX ADMIN — INDOMETHACIN 75 MG: 75 CAPSULE, EXTENDED RELEASE ORAL at 17:57

## 2017-08-30 RX ADMIN — AMLODIPINE BESYLATE 5 MG: 5 TABLET ORAL at 08:12

## 2017-08-30 RX ADMIN — Medication 10 ML: at 10:55

## 2017-08-30 RX ADMIN — METOPROLOL TARTRATE 12.5 MG: 25 TABLET, FILM COATED ORAL at 20:13

## 2017-08-30 RX ADMIN — COLCHICINE 0.6 MG: 0.6 TABLET, FILM COATED ORAL at 08:12

## 2017-08-30 RX ADMIN — ONDANSETRON 4 MG: 2 INJECTION INTRAMUSCULAR; INTRAVENOUS at 11:38

## 2017-08-30 RX ADMIN — CYCLOBENZAPRINE HYDROCHLORIDE 10 MG: 10 TABLET, FILM COATED ORAL at 16:00

## 2017-08-30 RX ADMIN — DOCUSATE SODIUM 100 MG: 100 CAPSULE, LIQUID FILLED ORAL at 17:56

## 2017-08-30 RX ADMIN — INSULIN LISPRO 30 UNITS: 100 INJECTION, SOLUTION INTRAVENOUS; SUBCUTANEOUS at 17:57

## 2017-08-30 RX ADMIN — OXYCODONE AND ACETAMINOPHEN 2 TABLET: 5; 325 TABLET ORAL at 08:16

## 2017-08-30 RX ADMIN — INSULIN LISPRO 30 UNITS: 100 INJECTION, SOLUTION INTRAVENOUS; SUBCUTANEOUS at 11:31

## 2017-08-30 RX ADMIN — FUROSEMIDE 40 MG: 40 TABLET ORAL at 10:55

## 2017-08-30 RX ADMIN — GABAPENTIN 300 MG: 300 CAPSULE ORAL at 08:12

## 2017-08-30 RX ADMIN — INSULIN DETEMIR 45 UNITS: 100 INJECTION, SOLUTION SUBCUTANEOUS at 08:12

## 2017-08-30 RX ADMIN — TOPIRAMATE 100 MG: 100 TABLET, FILM COATED ORAL at 20:14

## 2017-08-30 RX ADMIN — INSULIN DETEMIR 45 UNITS: 100 INJECTION, SOLUTION SUBCUTANEOUS at 20:23

## 2017-08-30 RX ADMIN — PANTOPRAZOLE SODIUM 40 MG: 40 TABLET, DELAYED RELEASE ORAL at 06:07

## 2017-08-30 RX ADMIN — CYCLOBENZAPRINE HYDROCHLORIDE 10 MG: 10 TABLET, FILM COATED ORAL at 20:14

## 2017-08-30 RX ADMIN — HYDROMORPHONE HYDROCHLORIDE 0.5 MG: 1 INJECTION, SOLUTION INTRAMUSCULAR; INTRAVENOUS; SUBCUTANEOUS at 20:22

## 2017-08-30 RX ADMIN — MAGNESIUM HYDROXIDE 30 ML: 2400 SUSPENSION ORAL at 08:16

## 2017-08-30 RX ADMIN — OXYCODONE AND ACETAMINOPHEN 2 TABLET: 5; 325 TABLET ORAL at 20:22

## 2017-08-30 RX ADMIN — POTASSIUM CHLORIDE 20 MEQ: 750 CAPSULE, EXTENDED RELEASE ORAL at 10:55

## 2017-08-30 RX ADMIN — INSULIN LISPRO 30 UNITS: 100 INJECTION, SOLUTION INTRAVENOUS; SUBCUTANEOUS at 08:13

## 2017-08-30 RX ADMIN — OXYCODONE AND ACETAMINOPHEN 2 TABLET: 5; 325 TABLET ORAL at 04:42

## 2017-08-30 RX ADMIN — ATORVASTATIN CALCIUM 40 MG: 40 TABLET, FILM COATED ORAL at 20:15

## 2017-08-30 RX ADMIN — INDOMETHACIN 75 MG: 75 CAPSULE, EXTENDED RELEASE ORAL at 08:12

## 2017-08-30 RX ADMIN — GABAPENTIN 300 MG: 300 CAPSULE ORAL at 20:15

## 2017-08-30 RX ADMIN — HYDROMORPHONE HYDROCHLORIDE 0.5 MG: 1 INJECTION, SOLUTION INTRAMUSCULAR; INTRAVENOUS; SUBCUTANEOUS at 11:37

## 2017-08-30 RX ADMIN — ASPIRIN 325 MG: 325 TABLET, DELAYED RELEASE ORAL at 08:12

## 2017-08-30 RX ADMIN — CETIRIZINE HYDROCHLORIDE 10 MG: 10 TABLET, FILM COATED ORAL at 08:12

## 2017-08-30 RX ADMIN — DOCUSATE SODIUM 100 MG: 100 CAPSULE, LIQUID FILLED ORAL at 08:12

## 2017-08-30 RX ADMIN — METOPROLOL TARTRATE 12.5 MG: 25 TABLET, FILM COATED ORAL at 08:12

## 2017-08-30 RX ADMIN — POLYETHYLENE GLYCOL 3350 17 G: 17 POWDER, FOR SOLUTION ORAL at 08:12

## 2017-08-30 RX ADMIN — OXYCODONE AND ACETAMINOPHEN 2 TABLET: 5; 325 TABLET ORAL at 15:59

## 2017-08-30 RX ADMIN — CYCLOBENZAPRINE HYDROCHLORIDE 10 MG: 10 TABLET, FILM COATED ORAL at 08:12

## 2017-08-30 RX ADMIN — Medication 10 ML: at 17:57

## 2017-08-30 RX ADMIN — Medication 10 ML: at 04:42

## 2017-08-30 RX ADMIN — Medication 2 TABLET: at 20:13

## 2017-08-31 LAB
ANION GAP SERPL CALCULATED.3IONS-SCNC: 7 MMOL/L (ref 3–11)
BUN BLD-MCNC: 28 MG/DL (ref 9–23)
BUN/CREAT SERPL: 31.1 (ref 7–25)
CALCIUM SPEC-SCNC: 8.9 MG/DL (ref 8.7–10.4)
CHLORIDE SERPL-SCNC: 98 MMOL/L (ref 99–109)
CO2 SERPL-SCNC: 27 MMOL/L (ref 20–31)
CREAT BLD-MCNC: 0.9 MG/DL (ref 0.6–1.3)
GFR SERPL CREATININE-BSD FRML MDRD: 87 ML/MIN/1.73
GLUCOSE BLD-MCNC: 59 MG/DL (ref 70–100)
GLUCOSE BLDC GLUCOMTR-MCNC: 125 MG/DL (ref 70–130)
GLUCOSE BLDC GLUCOMTR-MCNC: 137 MG/DL (ref 70–130)
GLUCOSE BLDC GLUCOMTR-MCNC: 78 MG/DL (ref 70–130)
GLUCOSE BLDC GLUCOMTR-MCNC: 84 MG/DL (ref 70–130)
GLUCOSE BLDC GLUCOMTR-MCNC: 93 MG/DL (ref 70–130)
GLUCOSE BLDC GLUCOMTR-MCNC: 97 MG/DL (ref 70–130)
POTASSIUM BLD-SCNC: 4.7 MMOL/L (ref 3.5–5.5)
SODIUM BLD-SCNC: 132 MMOL/L (ref 132–146)

## 2017-08-31 PROCEDURE — 97116 GAIT TRAINING THERAPY: CPT

## 2017-08-31 PROCEDURE — 25010000002 HYDROMORPHONE PER 4 MG: Performed by: PHYSICIAN ASSISTANT

## 2017-08-31 PROCEDURE — 63710000001 INSULIN DETEMIR PER 5 UNITS: Performed by: INTERNAL MEDICINE

## 2017-08-31 PROCEDURE — 82962 GLUCOSE BLOOD TEST: CPT

## 2017-08-31 PROCEDURE — 99024 POSTOP FOLLOW-UP VISIT: CPT | Performed by: PHYSICIAN ASSISTANT

## 2017-08-31 PROCEDURE — 63710000001 INSULIN LISPRO (HUMAN) PER 5 UNITS: Performed by: INTERNAL MEDICINE

## 2017-08-31 PROCEDURE — 99232 SBSQ HOSP IP/OBS MODERATE 35: CPT | Performed by: INTERNAL MEDICINE

## 2017-08-31 PROCEDURE — 87077 CULTURE AEROBIC IDENTIFY: CPT | Performed by: INTERNAL MEDICINE

## 2017-08-31 PROCEDURE — 87205 SMEAR GRAM STAIN: CPT | Performed by: INTERNAL MEDICINE

## 2017-08-31 PROCEDURE — 80048 BASIC METABOLIC PNL TOTAL CA: CPT | Performed by: PHYSICIAN ASSISTANT

## 2017-08-31 PROCEDURE — 99024 POSTOP FOLLOW-UP VISIT: CPT | Performed by: THORACIC SURGERY (CARDIOTHORACIC VASCULAR SURGERY)

## 2017-08-31 PROCEDURE — 87186 SC STD MICRODIL/AGAR DIL: CPT | Performed by: INTERNAL MEDICINE

## 2017-08-31 PROCEDURE — 87070 CULTURE OTHR SPECIMN AEROBIC: CPT | Performed by: INTERNAL MEDICINE

## 2017-08-31 RX ADMIN — OXYCODONE AND ACETAMINOPHEN 2 TABLET: 5; 325 TABLET ORAL at 09:12

## 2017-08-31 RX ADMIN — GABAPENTIN 300 MG: 300 CAPSULE ORAL at 21:32

## 2017-08-31 RX ADMIN — INDOMETHACIN 75 MG: 75 CAPSULE, EXTENDED RELEASE ORAL at 17:15

## 2017-08-31 RX ADMIN — TOPIRAMATE 100 MG: 100 TABLET, FILM COATED ORAL at 21:32

## 2017-08-31 RX ADMIN — Medication 10 ML: at 17:16

## 2017-08-31 RX ADMIN — OXYCODONE AND ACETAMINOPHEN 2 TABLET: 5; 325 TABLET ORAL at 04:55

## 2017-08-31 RX ADMIN — INSULIN LISPRO 30 UNITS: 100 INJECTION, SOLUTION INTRAVENOUS; SUBCUTANEOUS at 11:55

## 2017-08-31 RX ADMIN — INSULIN DETEMIR 45 UNITS: 100 INJECTION, SOLUTION SUBCUTANEOUS at 08:32

## 2017-08-31 RX ADMIN — INDOMETHACIN 75 MG: 75 CAPSULE, EXTENDED RELEASE ORAL at 08:24

## 2017-08-31 RX ADMIN — CYCLOBENZAPRINE HYDROCHLORIDE 10 MG: 10 TABLET, FILM COATED ORAL at 21:32

## 2017-08-31 RX ADMIN — CYCLOBENZAPRINE HYDROCHLORIDE 10 MG: 10 TABLET, FILM COATED ORAL at 08:24

## 2017-08-31 RX ADMIN — GABAPENTIN 300 MG: 300 CAPSULE ORAL at 08:24

## 2017-08-31 RX ADMIN — ASPIRIN 325 MG: 325 TABLET, DELAYED RELEASE ORAL at 08:24

## 2017-08-31 RX ADMIN — Medication 10 ML: at 04:46

## 2017-08-31 RX ADMIN — Medication 10 ML: at 09:14

## 2017-08-31 RX ADMIN — COLCHICINE 0.6 MG: 0.6 TABLET, FILM COATED ORAL at 08:24

## 2017-08-31 RX ADMIN — FUROSEMIDE 40 MG: 40 TABLET ORAL at 08:24

## 2017-08-31 RX ADMIN — Medication 10 ML: at 18:06

## 2017-08-31 RX ADMIN — CYCLOBENZAPRINE HYDROCHLORIDE 10 MG: 10 TABLET, FILM COATED ORAL at 17:14

## 2017-08-31 RX ADMIN — OXYCODONE AND ACETAMINOPHEN 2 TABLET: 5; 325 TABLET ORAL at 21:45

## 2017-08-31 RX ADMIN — INSULIN LISPRO 30 UNITS: 100 INJECTION, SOLUTION INTRAVENOUS; SUBCUTANEOUS at 08:25

## 2017-08-31 RX ADMIN — POTASSIUM CHLORIDE 20 MEQ: 750 CAPSULE, EXTENDED RELEASE ORAL at 08:25

## 2017-08-31 RX ADMIN — OXYCODONE AND ACETAMINOPHEN 2 TABLET: 5; 325 TABLET ORAL at 15:06

## 2017-08-31 RX ADMIN — DOCUSATE SODIUM 100 MG: 100 CAPSULE, LIQUID FILLED ORAL at 08:24

## 2017-08-31 RX ADMIN — AMLODIPINE BESYLATE 5 MG: 5 TABLET ORAL at 08:24

## 2017-08-31 RX ADMIN — METOPROLOL TARTRATE 12.5 MG: 25 TABLET, FILM COATED ORAL at 21:32

## 2017-08-31 RX ADMIN — ATORVASTATIN CALCIUM 40 MG: 40 TABLET, FILM COATED ORAL at 21:32

## 2017-08-31 RX ADMIN — POLYETHYLENE GLYCOL 3350 17 G: 17 POWDER, FOR SOLUTION ORAL at 08:24

## 2017-08-31 RX ADMIN — METOPROLOL TARTRATE 12.5 MG: 25 TABLET, FILM COATED ORAL at 08:24

## 2017-08-31 RX ADMIN — PANTOPRAZOLE SODIUM 40 MG: 40 TABLET, DELAYED RELEASE ORAL at 06:42

## 2017-08-31 RX ADMIN — CETIRIZINE HYDROCHLORIDE 10 MG: 10 TABLET, FILM COATED ORAL at 08:24

## 2017-08-31 RX ADMIN — HYDROMORPHONE HYDROCHLORIDE 0.5 MG: 1 INJECTION, SOLUTION INTRAMUSCULAR; INTRAVENOUS; SUBCUTANEOUS at 18:51

## 2017-09-01 ENCOUNTER — APPOINTMENT (OUTPATIENT)
Dept: GENERAL RADIOLOGY | Facility: HOSPITAL | Age: 57
End: 2017-09-01

## 2017-09-01 PROBLEM — J18.9 HCAP (HEALTHCARE-ASSOCIATED PNEUMONIA): Status: ACTIVE | Noted: 2017-09-01

## 2017-09-01 LAB
ANION GAP SERPL CALCULATED.3IONS-SCNC: 2 MMOL/L (ref 3–11)
BACTERIA SPEC ANAEROBE CULT: NORMAL
BASOPHILS # BLD AUTO: 0.06 10*3/MM3 (ref 0–0.2)
BASOPHILS NFR BLD AUTO: 0.7 % (ref 0–1)
BUN BLD-MCNC: 29 MG/DL (ref 9–23)
BUN/CREAT SERPL: 32.2 (ref 7–25)
CALCIUM SPEC-SCNC: 9.3 MG/DL (ref 8.7–10.4)
CHLORIDE SERPL-SCNC: 101 MMOL/L (ref 99–109)
CO2 SERPL-SCNC: 29 MMOL/L (ref 20–31)
CREAT BLD-MCNC: 0.9 MG/DL (ref 0.6–1.3)
DEPRECATED RDW RBC AUTO: 41.2 FL (ref 37–54)
EOSINOPHIL # BLD AUTO: 0.8 10*3/MM3 (ref 0–0.3)
EOSINOPHIL NFR BLD AUTO: 9.2 % (ref 0–3)
ERYTHROCYTE [DISTWIDTH] IN BLOOD BY AUTOMATED COUNT: 13.6 % (ref 11.3–14.5)
GFR SERPL CREATININE-BSD FRML MDRD: 87 ML/MIN/1.73
GLUCOSE BLD-MCNC: 138 MG/DL (ref 70–100)
GLUCOSE BLDC GLUCOMTR-MCNC: 137 MG/DL (ref 70–130)
GLUCOSE BLDC GLUCOMTR-MCNC: 146 MG/DL (ref 70–130)
GLUCOSE BLDC GLUCOMTR-MCNC: 228 MG/DL (ref 70–130)
GLUCOSE BLDC GLUCOMTR-MCNC: 94 MG/DL (ref 70–130)
GLUCOSE BLDC GLUCOMTR-MCNC: 98 MG/DL (ref 70–130)
HCT VFR BLD AUTO: 28.1 % (ref 38.9–50.9)
HGB BLD-MCNC: 9 G/DL (ref 13.1–17.5)
IMM GRANULOCYTES # BLD: 0.06 10*3/MM3 (ref 0–0.03)
IMM GRANULOCYTES NFR BLD: 0.7 % (ref 0–0.6)
LYMPHOCYTES # BLD AUTO: 1.36 10*3/MM3 (ref 0.6–4.8)
LYMPHOCYTES NFR BLD AUTO: 15.7 % (ref 24–44)
MCH RBC QN AUTO: 26.3 PG (ref 27–31)
MCHC RBC AUTO-ENTMCNC: 32 G/DL (ref 32–36)
MCV RBC AUTO: 82.2 FL (ref 80–99)
MONOCYTES # BLD AUTO: 0.68 10*3/MM3 (ref 0–1)
MONOCYTES NFR BLD AUTO: 7.8 % (ref 0–12)
NEUTROPHILS # BLD AUTO: 5.73 10*3/MM3 (ref 1.5–8.3)
NEUTROPHILS NFR BLD AUTO: 65.9 % (ref 41–71)
PLATELET # BLD AUTO: 264 10*3/MM3 (ref 150–450)
PMV BLD AUTO: 8.8 FL (ref 6–12)
POTASSIUM BLD-SCNC: 4.7 MMOL/L (ref 3.5–5.5)
PROCALCITONIN SERPL-MCNC: <0.05 NG/ML
RBC # BLD AUTO: 3.42 10*6/MM3 (ref 4.2–5.76)
SODIUM BLD-SCNC: 132 MMOL/L (ref 132–146)
WBC NRBC COR # BLD: 8.69 10*3/MM3 (ref 3.5–10.8)

## 2017-09-01 PROCEDURE — 94799 UNLISTED PULMONARY SVC/PX: CPT

## 2017-09-01 PROCEDURE — 80048 BASIC METABOLIC PNL TOTAL CA: CPT | Performed by: PHYSICIAN ASSISTANT

## 2017-09-01 PROCEDURE — 99024 POSTOP FOLLOW-UP VISIT: CPT | Performed by: THORACIC SURGERY (CARDIOTHORACIC VASCULAR SURGERY)

## 2017-09-01 PROCEDURE — 99232 SBSQ HOSP IP/OBS MODERATE 35: CPT | Performed by: INTERNAL MEDICINE

## 2017-09-01 PROCEDURE — 87150 DNA/RNA AMPLIFIED PROBE: CPT | Performed by: PHYSICIAN ASSISTANT

## 2017-09-01 PROCEDURE — 85025 COMPLETE CBC W/AUTO DIFF WBC: CPT | Performed by: PHYSICIAN ASSISTANT

## 2017-09-01 PROCEDURE — 71020 HC CHEST PA AND LATERAL: CPT

## 2017-09-01 PROCEDURE — 82962 GLUCOSE BLOOD TEST: CPT

## 2017-09-01 PROCEDURE — 97110 THERAPEUTIC EXERCISES: CPT

## 2017-09-01 PROCEDURE — 87147 CULTURE TYPE IMMUNOLOGIC: CPT | Performed by: PHYSICIAN ASSISTANT

## 2017-09-01 PROCEDURE — 97116 GAIT TRAINING THERAPY: CPT

## 2017-09-01 PROCEDURE — 99233 SBSQ HOSP IP/OBS HIGH 50: CPT | Performed by: PHYSICIAN ASSISTANT

## 2017-09-01 PROCEDURE — 63710000001 INSULIN LISPRO (HUMAN) PER 5 UNITS: Performed by: PHYSICIAN ASSISTANT

## 2017-09-01 PROCEDURE — 25010000002 FUROSEMIDE PER 20 MG: Performed by: PHYSICIAN ASSISTANT

## 2017-09-01 PROCEDURE — 25010000002 PIPERACILLIN-TAZOBACTAM: Performed by: PHYSICIAN ASSISTANT

## 2017-09-01 PROCEDURE — 87186 SC STD MICRODIL/AGAR DIL: CPT | Performed by: PHYSICIAN ASSISTANT

## 2017-09-01 PROCEDURE — 99024 POSTOP FOLLOW-UP VISIT: CPT | Performed by: PHYSICIAN ASSISTANT

## 2017-09-01 PROCEDURE — 87040 BLOOD CULTURE FOR BACTERIA: CPT | Performed by: PHYSICIAN ASSISTANT

## 2017-09-01 PROCEDURE — 84145 PROCALCITONIN (PCT): CPT | Performed by: PHYSICIAN ASSISTANT

## 2017-09-01 RX ORDER — IPRATROPIUM BROMIDE AND ALBUTEROL SULFATE 2.5; .5 MG/3ML; MG/3ML
3 SOLUTION RESPIRATORY (INHALATION) EVERY 4 HOURS PRN
Status: DISCONTINUED | OUTPATIENT
Start: 2017-09-01 | End: 2017-09-05 | Stop reason: HOSPADM

## 2017-09-01 RX ORDER — FUROSEMIDE 10 MG/ML
40 INJECTION INTRAMUSCULAR; INTRAVENOUS ONCE
Status: COMPLETED | OUTPATIENT
Start: 2017-09-01 | End: 2017-09-01

## 2017-09-01 RX ORDER — BUMETANIDE 1 MG/1
2 TABLET ORAL DAILY
Status: DISCONTINUED | OUTPATIENT
Start: 2017-09-02 | End: 2017-09-05 | Stop reason: HOSPADM

## 2017-09-01 RX ORDER — SACCHAROMYCES BOULARDII 250 MG
250 CAPSULE ORAL 2 TIMES DAILY
Status: DISCONTINUED | OUTPATIENT
Start: 2017-09-01 | End: 2017-09-03

## 2017-09-01 RX ADMIN — ASPIRIN 325 MG: 325 TABLET, DELAYED RELEASE ORAL at 09:57

## 2017-09-01 RX ADMIN — INSULIN LISPRO 3 UNITS: 100 INJECTION, SOLUTION INTRAVENOUS; SUBCUTANEOUS at 21:25

## 2017-09-01 RX ADMIN — CYCLOBENZAPRINE HYDROCHLORIDE 10 MG: 10 TABLET, FILM COATED ORAL at 09:57

## 2017-09-01 RX ADMIN — TOPIRAMATE 100 MG: 100 TABLET, FILM COATED ORAL at 20:55

## 2017-09-01 RX ADMIN — OXYCODONE AND ACETAMINOPHEN 2 TABLET: 5; 325 TABLET ORAL at 05:54

## 2017-09-01 RX ADMIN — PANTOPRAZOLE SODIUM 40 MG: 40 TABLET, DELAYED RELEASE ORAL at 05:54

## 2017-09-01 RX ADMIN — FUROSEMIDE 40 MG: 10 INJECTION, SOLUTION INTRAMUSCULAR; INTRAVENOUS at 11:15

## 2017-09-01 RX ADMIN — METOPROLOL TARTRATE 12.5 MG: 25 TABLET, FILM COATED ORAL at 20:47

## 2017-09-01 RX ADMIN — CYCLOBENZAPRINE HYDROCHLORIDE 10 MG: 10 TABLET, FILM COATED ORAL at 20:48

## 2017-09-01 RX ADMIN — PIPERACILLIN SODIUM,TAZOBACTAM SODIUM 3.38 G: 3; .375 INJECTION, POWDER, FOR SOLUTION INTRAVENOUS at 21:25

## 2017-09-01 RX ADMIN — Medication 10 ML: at 11:15

## 2017-09-01 RX ADMIN — COLCHICINE 0.6 MG: 0.6 TABLET, FILM COATED ORAL at 09:57

## 2017-09-01 RX ADMIN — FUROSEMIDE 40 MG: 40 TABLET ORAL at 09:57

## 2017-09-01 RX ADMIN — GABAPENTIN 300 MG: 300 CAPSULE ORAL at 09:57

## 2017-09-01 RX ADMIN — CETIRIZINE HYDROCHLORIDE 10 MG: 10 TABLET, FILM COATED ORAL at 09:57

## 2017-09-01 RX ADMIN — POTASSIUM CHLORIDE 20 MEQ: 750 CAPSULE, EXTENDED RELEASE ORAL at 12:46

## 2017-09-01 RX ADMIN — AMLODIPINE BESYLATE 5 MG: 5 TABLET ORAL at 09:57

## 2017-09-01 RX ADMIN — PIPERACILLIN SODIUM,TAZOBACTAM SODIUM 3.38 G: 3; .375 INJECTION, POWDER, FOR SOLUTION INTRAVENOUS at 16:55

## 2017-09-01 RX ADMIN — CYCLOBENZAPRINE HYDROCHLORIDE 10 MG: 10 TABLET, FILM COATED ORAL at 16:56

## 2017-09-01 RX ADMIN — ATORVASTATIN CALCIUM 40 MG: 40 TABLET, FILM COATED ORAL at 20:48

## 2017-09-01 RX ADMIN — OXYCODONE AND ACETAMINOPHEN 2 TABLET: 5; 325 TABLET ORAL at 20:48

## 2017-09-01 RX ADMIN — METOPROLOL TARTRATE 12.5 MG: 25 TABLET, FILM COATED ORAL at 09:57

## 2017-09-01 RX ADMIN — GABAPENTIN 300 MG: 300 CAPSULE ORAL at 20:47

## 2017-09-01 RX ADMIN — Medication 250 MG: at 16:56

## 2017-09-01 NOTE — PROGRESS NOTES
[facility] Medicine Services  INPATIENT PROGRESS NOTE    Date of Admission: 8/11/2017  Length of Stay: 21  Primary Care Physician: Ottoniel Toledo MD    Subjective   CC: Follow-up med management post CABG     HPI:  Pt reports feeling poorly today, malaise, tired, mild dyspnea worse with ambulation. Off NC O2 now, ambulated earlier only dropped to 89 per nsg. Occasional cough, chilled O/N now resolved. No fever, myalgia, wheezing, CP, N/V. Encouraged to use flutter valve/IS. C/o wt gain, LE swelling and discomfort.    Review of Systems   Constitutional: Positive for chills. Negative for appetite change, diaphoresis and fever.        Malaise   Respiratory: Positive for cough and shortness of breath.    Cardiovascular: Positive for leg swelling. Negative for chest pain and palpitations.   Gastrointestinal: Negative for constipation, diarrhea, nausea and vomiting.   Genitourinary: Negative for decreased urine volume and difficulty urinating.   Musculoskeletal: Negative for myalgias.   All other systems reviewed and are negative.         Objective      Temp:  [98.2 °F (36.8 °C)-99.2 °F (37.3 °C)] 98.2 °F (36.8 °C)  Heart Rate:  [84-93] 87  Resp:  [16-18] 16  BP: ()/(60-75) 119/70     Physical Exam  Constitutional: resting in chair, appears comfortable, in no acute distress  Respiratory:  Diminished throughout, fine crackles in bases bilat; no rhonchi or wheezing, Non-labored respirations.  Cardiovascular: RRR, no murmurs, rubs, or gallops, palpable pedal pulses bilaterally. Sternal incision, and EVH site c/d/i without erythema, induration or drainage. MT Drsg intact  Gastrointestinal: Positive bowel sounds, soft, nontender, protuberant  Musculoskeletal: BLE 1+  edema  Psychiatric: oriented x 3, appropriate affect, cooperative, and calm   Neurologic: speech is clear, follows commands, active ROM bilat UE/LE  Skin: warm and dry, sternal wound GUNNAR and healing w/o erythema       Results  Review:    I have reviewed the labs, radiology results and diagnostic studies.      Results from last 7 days  Lab Units 08/29/17  0403   WBC 10*3/mm3 9.92   HEMOGLOBIN g/dL 9.0*   PLATELETS 10*3/mm3 250       Results from last 7 days  Lab Units 08/31/17  0451   SODIUM mmol/L 132   POTASSIUM mmol/L 4.7   CHLORIDE mmol/L 98*   CO2 mmol/L 27.0   BUN mg/dL 28*   CREATININE mg/dL 0.90   GLUCOSE mg/dL 59*   CALCIUM mg/dL 8.9       Respiratory Culture [[id number]] (Abnormal) Collected: 08/31/17 0918       Lab Status: Preliminary result Specimen: Sputum from Cough Updated: 09/01/17 0826        Respiratory Culture Light growth (2+) Gram Negative Bacilli (A)         Light growth (2+) Normal Respiratory Ana M        Gram Stain Result Many (4+) WBCs per low power field         Few (2+) Epithelial cells per low power field         Many (4+) Gram positive cocci in pairs and chains       Anaerobic Culture [[id number]] (Normal) Collected: 08/25/17 1617       Lab Status: Final result Specimen: Body Fluid from Pericardium Updated: 09/01/17 1423        Culture No anaerobes isolated       Narrative:         CONTAINER SUBMITTED TO MICROBIOLOGY HAS NO PATIENT INFORMATION ON IT: INTERPRET RESULTS ACCORDINGLY       Fungus Culture [[id number]] Collected: 08/25/17 1617       Lab Status: Preliminary result Specimen: Body Fluid from Pericardium Updated: 09/01/17 1101        Fungus Culture No fungus isolated at less than 1 week       Narrative:         CONTAINER SUBMITTED TO MICROBIOLOGY HAS NO PATIENT INFORMATION ON IT: INTERPRET RESULTS ACCORDINGLY       Body Fluid Culture [[id number]] (Normal) Collected: 08/25/17 1617       Lab Status: Final result Specimen: Body Fluid from Pericardium Updated: 08/29/17 0647        BF Culture No growth at 4 days        Gram Stain Result Few (2+) WBCs seen         No organisms seen       Narrative:         CONTAINER SUBMITTED TO MICROBIOLOGY HAS NO PATIENT INFORMATION ON IT: INTERPRET RESULTS ACCORDINGLY        AFB Culture [[id number]] (Normal) Collected: 08/25/17 1617       Lab Status: Preliminary result Specimen: Body Fluid from Pericardium Updated: 08/30/17 1901        AFB Culture No AFB isolated at less than 1 week        AFB Stain No acid fast bacilli seen on concentrated smear       Narrative:         CONTAINER SUBMITTED TO MICROBIOLOGY HAS NO PATIENT INFORMATION ON IT: INTERPRET RESULTS ACCORDINGLY       Urine Culture [[id number]] (Normal) Collected: 08/15/17 1516       Lab Status: Final result Specimen: Urine from Urine, Catheter Updated: 08/17/17 0736        Urine Culture No growth at 2 days         Ref Range & Units 08/31/17 2137   08/31/17 1717   08/31/17 1616   08/31/17 1115   08/31/17 0740       Glucose 70 - 130 mg/dL 97 93 84 137 (H) 125       Narrative        Radiology Data:   Imaging Results (last 24 hours)     ** No results found for the last 24 hours. **          I have reviewed the medications.    Assessment/Plan     Problem List  Hospital Problem List     Hypercholesterolemia    Overview Signed 11/2/2016  9:34 AM by Shannon Torres      Assessed By: Yasmin Millard (Cardiology); Last Assessed: 13 Aug 2015         Diabetic polyneuropathy associated with type 2 diabetes mellitus    Seizure disorder    GERD (gastroesophageal reflux disease)    Essential hypertension    History of CVA (cerebrovascular accident)    Non morbid obesity due to excess calories    Post-infarction pericarditis           Mr. Harding is a 56yo male with multivessel CAD with LHC by Dr. Kim on 8/11/17 and CABG x3 by Dr. Damon on 8/15/17. Developed postop pericarditis w/effusion s/p left thoracotomy and pericardial window 8/25/17. Hospitalist Consulted upon transfer to acute care by CTS,  for medical management.       PER CT SURGERY/CARDIOLOGY  - Multivessel CAD s/p CABG x 3 on 8/15/17  Dr. Damon  -Post op pericarditis w/effusion s/p left anterior thoracotomy and pericardial window 8/25/17  - Hypertension, well controlled  -Diuresis  "per cards- wt up to 298 yesterday      Insulin-dependent DMII with peripheral neuropathy  - Hgb A1c this admission 8.9%  - He reports blood sugars at home are \"not good\" usually 200-300 range   - Majority of inpt stay has required 80-90 units levemir daily and ~70-90units lispro. However over the past 48hr significant reduction of insulin requirements. Today BG 94- without any insulin. Stop basal bolus insulin for now. Changed to low dose SSI, watch closely.        HCAP pna  + Gram Negative Bacilli on resp culture 9/1, final results pending  -Had QT prolongation on recent EKG, will not be able to use levaquin. Will start on Zosyn, follow cx  -check procalcitonin, blood cx  -CXR PA/LA, Tmax 99.2 last 24hr, check CBC BMP stat      Seizure disorder, stable continue Topamax     GERD, continue PPI  History of CVA, without residual. Continue ASA and statin   Morbid obesity (BMI 39), continue to encourage dietary compliance and lifestyle modification       Recommend keep additional day for further monitoring, await final resp cx.     Casie M Mayne, PABrendanC   09/01/17   1:46 PM      "

## 2017-09-01 NOTE — PLAN OF CARE
Problem: Patient Care Overview (Adult)  Goal: Plan of Care Review  Outcome: Ongoing (interventions implemented as appropriate)    09/01/17 1716   Coping/Psychosocial Response Interventions   Plan Of Care Reviewed With patient;spouse   Patient Care Overview   Progress improving   Outcome Evaluation   Outcome Summary/Follow up Plan Pt demonstrated increased indep with gait; maintained distance of 350 ft and declined progression due c/o SOA. Would benefit from attempt of gait with STC and standing ther ex.          Problem: Inpatient Physical Therapy  Goal: Bed Mobility Goal LTG- PT  Outcome: Ongoing (interventions implemented as appropriate)    08/16/17 1148 09/01/17 1716   Bed Mobility PT LTG   Bed Mobility PT LTG, Date Established 08/16/17 --    Bed Mobility PT LTG, Time to Achieve 2 wks --    Bed Mobility PT LTG, Activity Type supine to sit/sit to supine --    Bed Mobility PT LTG, [place] Level independent --    Bed Mobility PT LTG, Outcome --  goal ongoing       Goal: Transfer Training Goal 1 LTG- PT  Outcome: Ongoing (interventions implemented as appropriate)    08/16/17 1148 09/01/17 1716   Transfer Training PT LTG   Transfer Training PT LTG, Date Established 08/16/17 --    Transfer Training PT LTG, Time to Achieve 2 wks --    Transfer Training PT LTG, Activity Type sit to stand/stand to sit --    Transfer Training PT LTG, [place] Level independent --    Transfer Training PT LTG, Outcome --  goal ongoing       Goal: Gait Training Goal LTG- PT  Outcome: Ongoing (interventions implemented as appropriate)    08/24/17 1040 09/01/17 1716   Gait Training PT LTG   Gait Training Goal PT LTG, Date Established 08/16/17 --    Gait Training Goal PT LTG, Time to Achieve 2 wks --    Gait Training Goal PT LTG, [place] Level independent  (stable VS;O2 > 90%) --    Gait Training Goal PT LTG, Assist Device cane, straight --    Gait Training Goal PT LTG, Distance to Achieve 700  (MET dist. goal of 500 ft;new goal  700' w/) --    Gait Training Goal PT LTG, Outcome --  goal ongoing       Goal: Stair Training Goal LTG- PT  Outcome: Ongoing (interventions implemented as appropriate)    08/16/17 1148 09/01/17 1716   Stair Training PT LTG   Stair Training Goal PT LTG, Date Established 08/16/17 --    Stair Training Goal PT LTG, Time to Achieve 2 wks --    Stair Training Goal PT LTG, Number of Steps 3 --    Stair Training Goal PT LTG, [place] Level supervision required --    Stair Training Goal PT LTG, Outcome --  goal ongoing

## 2017-09-01 NOTE — PROGRESS NOTES
CTS Progress Note      POD 7 s/p Pericardial window  POD 17 s/p CABG     LOS: 21 days   Chief Complaint: SOB      Subjective  Transferred to tele yesterday. Doing well this am. Anxious to go home.     Objective    Vital Signs  Temp:  [97.5 °F (36.4 °C)-99.2 °F (37.3 °C)] 99.1 °F (37.3 °C)  Heart Rate:  [81-93] 84  Resp:  [16-18] 16  BP: ()/(55-75) 103/63    Physical Exam:   General Appearance: alert, appears stated age and cooperative   Lungs:  Decreased lung sounds bases   Heart: regular rhythm & normal rate, normal S1, S2 and no murmur, no reid, no rub   Chest: sternum stable   Skin: Incision c/d/i     Results     Results from last 7 days  Lab Units 08/29/17  0403   WBC 10*3/mm3 9.92   HEMOGLOBIN g/dL 9.0*   HEMATOCRIT % 27.1*   PLATELETS 10*3/mm3 250       Results from last 7 days  Lab Units 08/31/17  0451   SODIUM mmol/L 132   POTASSIUM mmol/L 4.7   CHLORIDE mmol/L 98*   CO2 mmol/L 27.0   BUN mg/dL 28*   CREATININE mg/dL 0.90   GLUCOSE mg/dL 59*   CALCIUM mg/dL 8.9       Imaging Results (last 24 hours)     ** No results found for the last 24 hours. **          Assessment    Active Problems:    Hypercholesterolemia    Diabetic polyneuropathy associated with type 2 diabetes mellitus    Seizure disorder    GERD (gastroesophageal reflux disease)    Essential hypertension    History of CVA (cerebrovascular accident)    Non morbid obesity due to excess calories    Post-infarction pericarditis      Plan   Ambulate  Pulm toilet  Wean oxygen    Dispo - discharge home today with home oxygen if all agree    LARRY Erazo  09/01/17  7:24 AM        Status as outlined above.  Patient remains stable.  Ambulating better.  Agree with current plan of discharge.  Patient will return to see Dr. Damon in 2-3 weeks.      Freeman Phillips MD  CTSurgery  09/01/17   10:40 AM

## 2017-09-01 NOTE — PROGRESS NOTES
"[facility]  IP Progress Note      Chief Complaint: CHF, htn, pericarditis       Subjective:  Reporting mild dyspnea on exertion today.  Feels like he has increased fluid retention today.  Denies palpitations.  Telemetry reveals sinus rhythm.  Received IV Lasix times one this morning..       Objective:  Blood pressure 119/70, pulse 87, temperature 98.2 °F (36.8 °C), temperature source Oral, resp. rate 16, height 70\" (177.8 cm), weight 298 lb 1 oz (135 kg), SpO2 96 %.     Intake/Output Summary (Last 24 hours) at 09/01/17 1314  Last data filed at 09/01/17 0900   Gross per 24 hour   Intake              660 ml   Output             1000 ml   Net             -340 ml       Physical Exam:  General: No acute distress.   Neck: no JVD.  Chest:normal effort, no wheezing or rhonchi, Diminished throughout  Cardiovascular: Normal S1-S2, no murmurs rubs or gallops   Extremities: Trivial bilateral pretibial edema    Results Review:     I reviewed the patient's new clinical results.      Results from last 7 days  Lab Units 08/29/17  0403   WBC 10*3/mm3 9.92   HEMOGLOBIN g/dL 9.0*   HEMATOCRIT % 27.1*   PLATELETS 10*3/mm3 250       Results from last 7 days  Lab Units 08/31/17  0451  08/26/17  0328   SODIUM mmol/L 132  < > 134  134   POTASSIUM mmol/L 4.7  < > 5.7*  5.7*   CHLORIDE mmol/L 98*  < > 104  104   CO2 mmol/L 27.0  < > 23.0  23.0   BUN mg/dL 28*  < > 22  22   CREATININE mg/dL 0.90  < > 0.80  0.80   CALCIUM mg/dL 8.9  < > 8.9  8.9   BILIRUBIN mg/dL  --   --  0.6   ALK PHOS U/L  --   --  117*   ALT (SGPT) U/L  --   --  23   AST (SGOT) U/L  --   --  30   GLUCOSE mg/dL 59*  < > 169*  169*   < > = values in this interval not displayed.    Results from last 7 days  Lab Units 08/31/17  0451   SODIUM mmol/L 132   POTASSIUM mmol/L 4.7   CHLORIDE mmol/L 98*   CO2 mmol/L 27.0   BUN mg/dL 28*   CREATININE mg/dL 0.90   GLUCOSE mg/dL 59*   CALCIUM mg/dL 8.9         Lab Results   Component Value " Date    CKTOTAL 66 04/11/2017    CKMB 0.5 11/28/2015    CKMBINDEX CKMB Index not calculated when CK Total <80 11/28/2015    TROPONINI <0.006 08/13/2017                   Tele: Sinus Rhythm       Assessment/Plan:  1. Post op pericarditis with effusion s/p left anterior thoracotomy and pericardial window 8/25/17  2. CABG x 3, LAD endarterectomy 8/15/17 Dr. Damon, Normal EF, DHF  3. HTN: Controlled, BB and Norvasc  4. CVA: Remote  5. DM  6. COPD/EDD  7. HLD: Statin    PLAN:  · Continue current medical therapy gradual diuresis  · Switching Lasix to Bumex  · Ambulate, pulmonary toilet

## 2017-09-01 NOTE — THERAPY TREATMENT NOTE
Acute Care - Physical Therapy Treatment Note  [facility]     Patient Name: Denzel Harding  : 1960  MRN: [id number]  Today's Date: 2017  Onset of Illness/Injury or Date of Surgery Date: 17  Date of Referral to PT: 17  Referring Physician: LARRY Byrnes    Admit Date: 2017    Visit Dx:    ICD-10-CM ICD-9-CM   1. Coronary artery disease involving native coronary artery of native heart with unstable angina pectoris I25.110 414.01     411.1   2. Impaired functional mobility, balance, gait, and endurance Z74.09 V49.89   3. HUTCHINS (dyspnea on exertion) R06.09 786.09   4. Diabetic polyneuropathy associated with type 2 diabetes mellitus E11.42 250.60     357.2   5. Type 2 diabetes mellitus with complication, unspecified long term insulin use status E11.8 250.90   6. Pericardial effusion I31.3 423.9     Patient Active Problem List   Diagnosis   • Coronary artery disease involving native coronary artery of native heart with unstable angina pectoris   • Lumbar radiculopathy   • Hypercholesterolemia   • Diabetic polyneuropathy associated with type 2 diabetes mellitus   • Migraine with aura and with status migrainosus   • Palpitations   • Seizure disorder   • GERD (gastroesophageal reflux disease)   • Brachial neuritis   • Cervical radiculopathy   • LOM (loss of memory)   • Anxiety   • Diabetes mellitus   • Lower back pain   • Essential hypertension   • History of CVA (cerebrovascular accident)   • Non morbid obesity due to excess calories   • Post-infarction pericarditis   • HCAP (healthcare-associated pneumonia)               Adult Rehabilitation Note       17 1648 17 0930 17 0840    Rehab Assessment/Intervention    Discipline physical therapist  -LS physical therapist  -SJ physical therapist  -LS    Document Type therapy note (daily note)  -LS therapy note (daily note)  -SJ therapy note (daily note)  -LS    Subjective Information agree to therapy;complains of;pain  -LS agree to  therapy;complains of;pain  -SJ agree to therapy;complains of;pain  -LS    Patient Effort, Rehab Treatment good  -LS adequate  -SJ     Patient Effort, Rehab Treatment Comment  During gait training, pt became agitated by cues for posture and sternal precautions.  -SJ     Symptoms Noted During/After Treatment shortness of breath  -LS none  -SJ     Precautions/Limitations cardiac precautions;fall precautions;sternal precautions  -LS sternal precautions;cardiac precautions  -SJ cardiac precautions;fall precautions;sternal precautions  -LS    Recorded by [LS] Kathrin Mera, PT [SJ] Leticia Ag, PT [LS] Kathrin Mera, PT    Vital Signs    Pre Systolic BP Rehab 119  -LS 92  -  -LS    Pre Treatment Diastolic BP 70  -LS 55  -SJ 75  -LS    Post Systolic BP Rehab  116  -SJ --   using urinal at end of session  -LS    Post Treatment Diastolic BP  60  -SJ     Pretreatment Heart Rate (beats/min) 91  -LS 84  -SJ     Intratreatment Heart Rate (beats/min)   108  -LS    Posttreatment Heart Rate (beats/min) 104  -LS  103  -LS    Pre SpO2 (%) 94  -LS 97  -SJ 96  -LS    O2 Delivery Pre Treatment room air  -LS supplemental O2  -SJ room air  -LS    Intra SpO2 (%)   93  -LS    O2 Delivery Intra Treatment   room air  -LS    Post SpO2 (%) 92  -LS 96  -SJ 95  -LS    O2 Delivery Post Treatment room air  -LS supplemental O2  -SJ room air  -LS    Pre Patient Position Sitting  -LS Supine  -SJ Sitting  -LS    Intra Patient Position Standing  -LS Standing  -SJ Standing  -LS    Post Patient Position Sitting  -LS Sitting  -SJ Sitting  -LS    Recorded by [LS] Kathrin Mera, PT [SJ] Leticia Ag, PT [LS] Kathrin Mera, PT    Pain Assessment    Pain Assessment 0-10  -LS John-Fuentes FACES  -SJ 0-10  -LS    Pain Score 4  -LS 3  -SJ 7  -LS    Post Pain Score 7  -LS 4  -SJ 8  -LS    Pain Location Chest  -LS Shoulder  -SJ Mediastinum   previous tube insertion site  -LS    Pain Orientation Right  -LS Right;Anterior  -SJ     Pain Intervention(s)  Repositioned;Ambulation/increased activity  -LS Medication (See MAR);Repositioned;Ambulation/increased activity  -SJ Repositioned;Ambulation/increased activity   RN pre-medicated for session  -LS    Response to Interventions tolerated  -LS clementina  -SJ tolerated  -LS    Recorded by [LS] Kathrin Mera, PT [SJ] Leticia Ag PT [LS] Kathrin Mera PT    Cognitive Assessment/Intervention    Current Cognitive/Communication Assessment functional  -LS functional  -SJ functional  -LS    Orientation Status oriented x 4  -LS oriented x 4  -SJ oriented x 4  -LS    Follows Commands/Answers Questions able to follow single-step instructions;75% of the time;100% of the time;needs cueing;needs increased time  -LS able to follow single-step instructions;needs cueing  -SJ able to follow single-step instructions;100% of the time  -LS    Personal Safety mild impairment;decreased awareness, need for safety  -LS mild impairment;decreased awareness, need for assist  -SJ mild impairment;decreased awareness, need for safety  -LS    Personal Safety Interventions fall prevention program maintained;gait belt;nonskid shoes/slippers when out of bed  -LS gait belt;muscle strengthening facilitated;nonskid shoes/slippers when out of bed  -SJ fall prevention program maintained;gait belt;nonskid shoes/slippers when out of bed  -LS    Recorded by [LS] Kathrin Mera, PT [SJ] Leticia Ag PT [LS] Kathrin Mera, PT    Bed Mobility, Assessment/Treatment    Bed Mob, Supine to Sit, [place]  moderate assist (50% patient effort);verbal cues required  -     Bed Mobility, Safety Issues  decreased use of arms for pushing/pulling;decreased use of legs for bridging/pushing  -     Bed Mobility, Impairments  strength decreased;pain  -     Bed Mobility, Comment UIC upon arrival  -LS assist for trunk to raise up into sitting. Verbal cues for sternal precautions.  -SJ UIC upon arrival.   -LS    Recorded by [LS] Kathrin Mera, PT [SJ] Leticia Ag PT  [LS] Kathrin Mera, PT    Transfer Assessment/Treatment    Transfers, Sit-Stand [place] supervision required;verbal cues required  -LS supervision required;verbal cues required  -SJ supervision required;verbal cues required  -LS    Transfers, Stand-Sit [place] supervision required;verbal cues required  -LS supervision required;verbal cues required  -SJ supervision required;verbal cues required  -LS    Transfers, Sit-Stand-Sit, Assist Device rolling walker  -LS rolling walker  -SJ rolling walker  -LS    Transfer, Impairments pain;strength decreased  -LS pain;strength decreased  -SJ pain;strength decreased  -LS    Transfer, Comment VC's for maintaining sternal precautions.   -LS verbal cues for sternal precautions  -SJ VC's for sternal precautions.   -LS    Recorded by [LS] Kathrin Mera, PT [SJ] Leticia Ag PT [LS] Kathrin Mera PT    Gait Assessment/Treatment    Gait, [place] Level supervision required;verbal cues required  -LS contact guard assist;verbal cues required;1 person + 1 person to manage equipment  -SJ supervision required;verbal cues required  -LS    Gait, Assistive Device rolling walker  -LS rolling walker  -SJ rolling walker  -LS    Gait, Distance (Feet) 350  -  -  -LS    Gait, Gait Pattern Analysis  swing-through gait  -SJ     Gait, Gait Deviations han decreased;step length decreased;forward flexed posture  -LS forward flexed posture  -SJ han decreased;forward flexed posture;step length decreased  -LS    Gait, Safety Issues  step length decreased  -SJ     Gait, Impairments strength decreased;pain  -LS pain;strength decreased  -SJ pain;strength decreased  -LS    Gait, Comment Pt declined further distance due to c/o SOA. VC's for PLB and increasing step length.   -LS Pt became agitated by cues for posture and sternal precautions  -SJ VC's for posture and deep breathing. Distance limited by fatigue, c/o SOA, and pain. Increased agitation by end of distance.   -LS     Recorded by [LS] Kathrin Mera, PT [SJ] Leticia Ag, PT [LS] Kathrin Mera, PT    Motor Skills/Interventions    Additional Documentation   Balance Skills Training (Group)  -LS    Recorded by   [LS] Kathrin Mera PT    Balance Skills Training    Sitting-Level of Assistance Close supervision  -LS Close supervision  -SJ Close supervision  -LS    Sitting-Balance Support Feet supported  -LS Feet supported  -SJ Feet supported  -LS    Standing-Level of Assistance Close supervision  -LS Close supervision  -SJ Close supervision  -LS    Static Standing Balance Support assistive device  -LS assistive device  -SJ assistive device  -LS    Gait Balance-Level of Assistance Close supervision  -LS Contact guard  -SJ Close supervision  -LS    Gait Balance Support assistive device  -LS assistive device  -SJ assistive device  -LS    Recorded by [LS] Kathrin Mera, PT [SJ] Leticia Ag, PT [LS] Kathrin Mera PT    Therapy Exercises    Bilateral Lower Extremities AROM:;10 reps;sitting;ankle pumps/circles;hip flexion;LAQ  -LS  AROM:;10 reps;sitting;ankle pumps/circles;LAQ;hip flexion  -LS    Recorded by [LS] Kathrin Mera PT  [LS] Kathrin Mera PT    Positioning and Restraints    Pre-Treatment Position sitting in chair/recliner  -LS in bed  -SJ sitting in chair/recliner  -LS    Post Treatment Position chair  -LS chair  -SJ chair  -LS    In Bed   notified nsg;sitting;call light within reach;encouraged to call for assist;with family/caregiver   using urinal with wife  -LS    In Chair notified nsg;reclined;call light within reach;encouraged to call for assist;with family/caregiver;legs elevated  -LS reclined;call light within reach;encouraged to call for assist;with family/caregiver  -SJ     Recorded by [LS] Kathrin Mera, PT [SJ] Leticia Ag, PT [LS] Kathrin Mera PT      User Key  (r) = Recorded By, (t) = Taken By, (c) = Cosigned By    Initials Name Effective Dates    RANJITH Ag, PT 06/19/15 -      Kathrin STARKS  Mera, PT 06/19/15 -                 IP PT Goals       09/01/17 1716 08/30/17 0941 08/29/17 1200    Bed Mobility PT LTG    Bed Mobility PT LTG, Outcome goal ongoing  -LS goal ongoing  -LS goal ongoing  -DM    Transfer Training PT LTG    Transfer Training PT LTG, Outcome goal ongoing  -LS goal ongoing  -LS goal ongoing  -DM    Gait Training PT LTG    Gait Training Goal PT LTG, Outcome goal ongoing  -LS goal ongoing  -LS goal ongoing  -DM    Stair Training PT LTG    Stair Training Goal PT LTG, Outcome goal ongoing  -LS goal ongoing  -LS goal ongoing  -DM      08/28/17 0952 08/24/17 1040 08/23/17 1115    Bed Mobility PT LTG    Bed Mobility PT LTG, Date Goal Reviewed 08/28/17  -LS      Bed Mobility PT LTG, Outcome goal ongoing  -LS goal ongoing  -DM goal ongoing  -DM    Transfer Training PT LTG    Transfer Training PT  LTG, Date Goal Reviewed 08/28/17  -LS      Transfer Training PT LTG, Outcome goal ongoing  -LS goal ongoing  -DM goal ongoing  -DM    Gait Training PT LTG    Gait Training Goal PT LTG, Date Established  08/16/17  -DM 08/16/17  -DM    Gait Training Goal PT LTG, Time to Achieve  2 wks  -DM 2 wks  -DM    Gait Training Goal PT LTG, [place] Level  independent   stable VS;O2 > 90%  -DM independent  -DM    Gait Training Goal PT LTG, Assist Device  cane, straight  -DM     Gait Training Goal PT LTG, Distance to Achieve  700   MET dist. goal of 500 ft;new goal 700' w/SPC  -   MET GOAL  FT;NEW GOAL 8-23  -DM    Gait Training Goal PT LTG, Date Goal Reviewed 08/28/17  -LS 08/24/17  -DM 08/23/17  -DM    Gait Training Goal PT LTG, Outcome goal ongoing  -LS goal revised  -DM goal revised  -DM    Stair Training PT LTG    Stair Training Goal PT LTG, Date Goal Reviewed 08/28/17  -LS      Stair Training Goal PT LTG, Outcome goal ongoing  -LS goal ongoing  -DM goal ongoing  -DM      08/21/17 1122 08/19/17 1133       Bed Mobility PT LTG    Bed Mobility PT LTG, Date Goal Reviewed 08/21/17  -AS      Bed Mobility  PT LTG, Outcome goal ongoing  -AS goal ongoing  -UD     Transfer Training PT LTG    Transfer Training PT  LTG, Date Goal Reviewed 08/21/17  -AS      Transfer Training PT LTG, Outcome goal ongoing  -AS goal ongoing  -UD     Gait Training PT LTG    Gait Training Goal PT LTG, Date Goal Reviewed 08/21/17  -AS      Gait Training Goal PT LTG, Outcome goal ongoing  -AS goal ongoing  -UD     Stair Training PT LTG    Stair Training Goal PT LTG, Date Goal Reviewed 08/21/17  -AS      Stair Training Goal PT LTG, Outcome goal ongoing  -AS goal ongoing  -UD       User Key  (r) = Recorded By, (t) = Taken By, (c) = Cosigned By    Initials Name Provider Type    UD Jailene Soliz, PTA Physical Therapy Assistant    CLIFFORD Amado, PT Physical Therapist    AS Esperanza Dawkins, PTA Physical Therapy Assistant    LS Kathrin Mera, PT Physical Therapist          Physical Therapy Education     Title: PT OT SLP Therapies (Active)     Topic: Physical Therapy (Active)     Point: Mobility training (Active)    Learning Progress Summary    Learner Readiness Method Response Comment Documented by Status   Patient Acceptance E,D NR  LS 09/01/17 1716 Active    Acceptance E NR  SJ 08/31/17 1109 Active    Acceptance E,D NR  LS 08/30/17 0941 Active    Eager D,E NR  DM 08/29/17 1200 Active    Acceptance E,D NR  LS 08/28/17 0952 Active    Eager D,E NR  DM 08/24/17 1040 Active    Eager E,D NR  DM 08/23/17 1115 Active    Acceptance E NR  AS 08/21/17 1122 Active    Acceptance E,D DU,NR  UD 08/19/17 1133 Done    Eager E,D NR  DM 08/18/17 1149 Active    Acceptance E NR  KR 08/17/17 1103 Active    Acceptance E NR  KR 08/16/17 1147 Active   Family Acceptance E NR  AS 08/21/17 1122 Active    Acceptance E,D DU,NR  UD 08/19/17 1133 Done   Significant Other Eager E,D NR  DM 08/23/17 1115 Active    Eager E,D NR  DM 08/18/17 1149 Active               Point: Home exercise program (Active)    Learning Progress Summary    Learner Readiness Method Response Comment  Documented by Status   Patient Acceptance E,D NR  LS 09/01/17 1716 Active    Acceptance E NR  SJ 08/31/17 1109 Active    Acceptance E,D NR  LS 08/30/17 0941 Active    Eager D,E NR  DM 08/29/17 1200 Active    Acceptance E,D NR  LS 08/28/17 0952 Active    Eager D,E NR  DM 08/24/17 1040 Active    Eager E,D NR  DM 08/23/17 1115 Active    Acceptance E NR  AS 08/21/17 1122 Active    Acceptance E,D DU,NR  UD 08/19/17 1133 Done    Eager E,D NR  DM 08/18/17 1149 Active   Family Acceptance E NR  AS 08/21/17 1122 Active    Acceptance E,D DU,NR  UD 08/19/17 1133 Done   Significant Other Eager E,D NR  DM 08/23/17 1115 Active    Eager E,D NR  DM 08/18/17 1149 Active               Point: Body mechanics (Active)    Learning Progress Summary    Learner Readiness Method Response Comment Documented by Status   Patient Acceptance E,D NR  LS 09/01/17 1716 Active    Acceptance E NR  SJ 08/31/17 1109 Active    Acceptance E,D NR  LS 08/30/17 0941 Active    Eager D,E NR  DM 08/29/17 1200 Active    Acceptance E,D NR  LS 08/28/17 0952 Active    Eager D,E NR  DM 08/24/17 1040 Active    Eager E,D NR  DM 08/23/17 1115 Active    Acceptance E NR  AS 08/21/17 1122 Active    Acceptance E,D DU,NR  UD 08/19/17 1133 Done    Eager E,D NR  DM 08/18/17 1149 Active    Acceptance E NR  KR 08/17/17 1103 Active    Acceptance E NR  KR 08/16/17 1147 Active   Family Acceptance E NR  AS 08/21/17 1122 Active    Acceptance E,D DU,NR  UD 08/19/17 1133 Done   Significant Other Eager E,D NR  DM 08/23/17 1115 Active    Eager E,D NR  DM 08/18/17 1149 Active               Point: Precautions (Active)    Learning Progress Summary    Learner Readiness Method Response Comment Documented by Status   Patient Acceptance E,D NR  LS 09/01/17 1716 Active    Acceptance E NR  SJ 08/31/17 1109 Active    Acceptance E,D NR  LS 08/30/17 0941 Active    ONUR Novak NR  DM 08/29/17 1200 Active    Acceptance EKENN NR  LS 08/28/17 0952 Active    ONUR Novak NR  DM 08/24/17 1040 Active    KENN Noguera  NR  DM 08/23/17 1115 Active    Acceptance E NR  AS 08/21/17 1122 Active    Acceptance E,D DU,NR  UD 08/19/17 1133 Done    Eager E,D NR  DM 08/18/17 1149 Active    Acceptance E NR  KR 08/17/17 1103 Active    Acceptance E NR  KR 08/16/17 1147 Active   Family Acceptance E NR  AS 08/21/17 1122 Active    Acceptance E,D DU,NR  UD 08/19/17 1133 Done   Significant Other Eager E,D NR  DM 08/23/17 1115 Active    Eager E,D NR  DM 08/18/17 1149 Active                      User Key     Initials Effective Dates Name Provider Type Discipline    UD 06/22/15 -  Jailene Soliz, PTA Physical Therapy Assistant PT    SJ 06/19/15 -  Leticia Ag, PT Physical Therapist PT    DM 06/19/15 -  Cris Amado, PT Physical Therapist PT    AS 06/22/15 -  Esperanza Dawkins, PTA Physical Therapy Assistant PT    LS 06/19/15 -  Kathrin Mera, PT Physical Therapist PT    KR 05/30/17 -  Deann Rodriguez, PT Student PT Student PT                    PT Recommendation and Plan  Anticipated Discharge Disposition: home with assist  PT Frequency: daily  Plan of Care Review  Plan Of Care Reviewed With: patient, spouse  Progress: improving  Outcome Summary/Follow up Plan: Pt demonstrated increased indep with gait; maintained distance of 350 ft and declined progression due c/o SOA. Would benefit from attempt of gait with STC and standing ther ex.           Outcome Measures       09/01/17 1648 08/31/17 0930 08/30/17 0840    How much help from another person do you currently need...    Turning from your back to your side while in flat bed without using bedrails? 3  -LS 3  -SJ 3  -LS    Moving from lying on back to sitting on the side of a flat bed without bedrails? 2  -LS 2  -SJ 2  -LS    Moving to and from a bed to a chair (including a wheelchair)? 3  -LS 3  -SJ 3  -LS    Standing up from a chair using your arms (e.g., wheelchair, bedside chair)? 3  -LS 3  -SJ 3  -LS    Climbing 3-5 steps with a railing? 3  -LS 3  -SJ 3  -LS    To walk in hospital room? 3  -LS  3  -SJ 3  -LS    AM-PAC 6 Clicks Score 17  -LS 17  -SJ 17  -LS    Functional Assessment    Outcome Measure Options AM-PAC 6 Clicks Basic Mobility (PT)  -LS AM-PAC 6 Clicks Basic Mobility (PT)  -SJ AM-PAC 6 Clicks Basic Mobility (PT)  -LS      User Key  (r) = Recorded By, (t) = Taken By, (c) = Cosigned By    Initials Name Provider Type     Leticia Ag, PT Physical Therapist    LS Kathrin Mera, PT Physical Therapist           Time Calculation:         PT Charges       09/01/17 1718          Time Calculation    Start Time 1648  -LS      PT Received On 09/01/17  -      PT Goal Re-Cert Due Date 09/07/17  -      Time Calculation- PT    Total Timed Code Minutes- PT 23 minute(s)  -LS        User Key  (r) = Recorded By, (t) = Taken By, (c) = Cosigned By    Initials Name Provider Type    LS Kathrin Mera, PT Physical Therapist          Therapy Charges for Today     Code Description Service Date Service Provider Modifiers Qty    28399785887 HC GAIT TRAINING EA 15 MIN 9/1/2017 Kathrin Mera, PT GP 1    68068679466 HC PT THER PROC EA 15 MIN 9/1/2017 Kathrin Mera, PT GP 1          PT G-Codes  Outcome Measure Options: AM-PAC 6 Clicks Basic Mobility (PT)    Kathrin Mera, PT  9/1/2017

## 2017-09-01 NOTE — PROGRESS NOTES
Continued Stay Note  [facility]     Patient Name: Denzel Harding  MRN: [id number]  Today's Date: 9/1/2017    Admit Date: 8/11/2017          Discharge Plan       09/01/17 1437    Case Management/Social Work Plan    Plan HOME    Patient/Family In Agreement With Plan yes    Additional Comments Met again with pt and wife at bedside.  Discussed RA O2 sats with RN and APRN.  Pt does not currently meet Medicare guidelines for home O2 (no documented RA sat 88% or less at rest or with ambulation).  Pt verbalized understanding.  Also discussed possible HH, however, pt denies need at present.  CM will cont to follow.              Discharge Codes     None        Expected Discharge Date and Time     Expected Discharge Date Expected Discharge Time    Sep 1, 2017             Leticia Zaman

## 2017-09-01 NOTE — DISCHARGE SUMMARY
CTS Discharge Summary    Patient Care Team:  Ottoniel Toledo MD as PCP - General (Family Medicine)      Date of Admission: 8/11/2017 12:03 PM  Date of Discharge:  9/5/17    Discharge Diagnosis  Past Medical History:   Diagnosis Date   • Anxiety    • Arthritis    • Asthma    • BiPAP (biphasic positive airway pressure) dependence    • Brachial neuritis 1/19/2017   • Chest pain    • COPD (chronic obstructive pulmonary disease)    • Depression    • Diabetes mellitus    • Dizziness    • Edema    • GERD (gastroesophageal reflux disease)    • H/O chest x-ray 07/04/2015    Mild Left base atelectasis   • H/O echocardiogram 07/05/2015    Normal LVSF. EF of 60-65%. Grade 1 diastolic dysfunction of the LV myocardium. No evidence of pericardial effusion   • H/O exercise stress test    • History of herniated intervertebral disc     History of left L5-S1 disc herniation   • LOM (loss of memory) 1/19/2017   • Lower back pain     Right   • Measles    • Obstructive sleep apnea    • Palpitations    • Seizure disorder    • Shortness of breath 1/19/2017   • SOB (shortness of breath)    • Stroke    • Wears glasses        Active Problems:    Hypercholesterolemia    Diabetic polyneuropathy associated with type 2 diabetes mellitus    Seizure disorder    GERD (gastroesophageal reflux disease)    Essential hypertension    History of CVA (cerebrovascular accident)    Non morbid obesity due to excess calories    Post-infarction pericarditis    HCAP (healthcare-associated pneumonia)      History of Present Illness  57 year old  male with a history of hypertension, poorly controlled diabetes, stroke, remote tobacco abuse, COPD, obstructive sleep apnea and known coronary artery disease status post stenting, who presents with chest pain.  For the past 3 months, he has noticed swelling in the bilateral lower extremities.  This was then accompanied by a dull chest pressure radiating to the right jaw and left arm.  The pain had no significant  aggravating or alleviating factors.  He also notes some associated nausea, exertional dyspnea and fatigue.    Hospital Course  Patient is a 57 y.o. male admitted from OSH 8/11/17 where LHC revealed severe 3vd. He underwent preop testing and on 8/15/17 underwent CABG x 3 without complications. Transported to the ICU, hemodynamically stable. Ventilator was weaned per protocol, and was extubated later that evening.   POD 1 Up in chair, on low-dose levo gtt. Poor expansion on CXR.   POD 2 Wires removed, transferred to tele  POD 3 Large chest tube output, continued recovery  POD 4 Slow recovery  POD 5 Continued large chest tube output, one chest tube removed.   POD 6 Continuous chest tube drainage from remaining 2 chest tubes, Indocin started for possible pericarditis  POD 7 Continued recovery  POD 8 IV out, unable to replace. Continued large chest tube output.   POD 9 Chest tubes removed, slow improvements  POD 10 Not feeling well, SOB. Echo ordered which revealed large pericardial effusion. Later that morning, underwent pericardial window.   POD 11 On Indocin and colchicine for suspected pericarditis, diuresis.   POD 12 Transferred to tele  POD 13 Doing well, continued chest tube mgnt.   POD 14 Slow improvements  POD 15 Chest tubes removed, slow improvements.   POD 16 Diuresis.  Positive respiratory cultures and was placed on IV antibiotics.  POD 17 Blood cultures pending.  POD 18  Positive blood culture but suspected contaminant.  Awaiting final culture.  BP meds increased.  POD 19 Awaiting final blood culture results.  Cough improved.  POD 20 Blood cultures negative at 24 hours.  Transitioned to PO abx.  Patient discharged to home.      Procedures Performed  8/15/17  CABG x 3, LAD endarderectomy  8/25/17   PERICARDIAL WINDOW       Consults:   Consults     Date and Time Order Name Status Description    8/15/2017 2775 Inpatient Consult to Cardiology Completed     8/13/2017 0643 Inpatient Consult to Hospitalist  Completed     8/11/2017 0037 Inpatient Consult to Cardiology Completed             Discharge Medications   Denzel Harding   Home Medication Instructions MARGARITA:[id number]    Printed on:09/08/17 4513   Medication Information                      albuterol (PROVENTIL HFA;VENTOLIN HFA) 108 (90 BASE) MCG/ACT inhaler  Inhale 2 puffs Every 4 (Four) Hours As Needed. ProAir  (90 Base) MCG/ACT Inhalation Aerosol Solution; Patient Sig: ProAir  (90 Base) MCG/ACT Inhalation Aerosol Solution ; 8; 0; 05-Oct-2015; Active             amLODIPine (NORVASC) 5 MG tablet  Take 1 tablet by mouth Daily.             aspirin  MG EC tablet  Take 1 tablet by mouth Daily.             atorvastatin (LIPITOR) 40 MG tablet  Take 1 tablet by mouth Every Night.             Cetirizine HCl 10 MG capsule  Take 1 capsule by mouth daily.             flunisolide (NASALIDE) 25 MCG/ACT (0.025%) solution nasal spray  Inhale 1 spray Every 12 (Twelve) Hours As Needed (nasal congestion).             furosemide (LASIX) 40 MG tablet  Take 1 tablet by mouth daily.             gabapentin (NEURONTIN) 300 MG capsule  Take 1 capsule by mouth 2 (Two) Times a Day.             HUMALOG KWIKPEN 100 UNIT/ML solution pen-injector  Inject 1 dose as directed Take As Directed. Follows [facility] From PCP             HYDROcodone-acetaminophen (NORCO) 7.5-325 MG per tablet  Take 1 tablet by mouth Every 6 (Six) Hours As Needed for Moderate Pain .             insulin lispro (humaLOG) 100 UNIT/ML injection  Inject 40 Units under the skin 3 (Three) Times a Day Before Meals.             Insulin Pen Needle 31G X 5 MM misc               levoFLOXacin (LEVAQUIN) 500 MG tablet  Take 1 tablet by mouth Daily for 7 doses. Indications: Upper Respiratory Tract Infection             lisinopril (PRINIVIL,ZESTRIL) 40 MG tablet  Take 1 tablet by mouth daily.             metoprolol tartrate (LOPRESSOR) 50 MG tablet  Take 1 tablet by mouth Every 12 (Twelve) Hours.             Misc  Natural Products (TUMERSAID) tablet  Take 1 tablet by mouth Daily.             montelukast (SINGULAIR) 10 MG tablet  take 1 tablet by mouth at bedtime             omeprazole (PriLOSEC) 20 MG capsule  Take 1 capsule by mouth daily.             potassium chloride (K-DUR,KLOR-CON) 20 MEQ CR tablet  Take 1 tablet by mouth daily.             ranolazine (RANEXA) 500 MG 12 hr tablet  Take 1 tablet by mouth 2 (Two) Times a Day.             topiramate (TOPAMAX) 100 MG tablet  Take 100 mg by mouth Daily.             TOUJEO SOLOSTAR 300 UNIT/ML solution pen-injector  Inject 150 Units as directed Every Night.             Vortioxetine HBr (TRINTELLIX) 10 MG tablet  Take 10 mg by mouth Daily.                 Discharge Diet:   Diet Instructions     Diet: Consistent Carbohydrate, Cardiac; Thin       Discharge Diet:   Consistent Carbohydrate  Cardiac      Fluid Consistency:  Thin                 Activity at Discharge:   Activity Instructions     Discharge Activity Restrictions       1) No driving for 6 weeks and no longer taking narcotics.   2) Do not lift / push / pull more than 10 lbs.                 Follow-up Appointments  Future Appointments  Date Time Provider Department Center   9/8/2017 3:00 PM ANTONIO Bray [facility]   9/21/2017 10:15 AM Freeman Phillips MD [facility] None   10/10/2017 9:00 AM  [facility]   10/11/2017 10:45 AM London Jiménez III, MD [facility] None          Viviana Luciano PA-C  09/08/17  12:43 PM

## 2017-09-01 NOTE — PLAN OF CARE
Problem: Patient Care Overview (Adult)  Goal: Plan of Care Review  Outcome: Ongoing (interventions implemented as appropriate)    09/01/17 0404   Coping/Psychosocial Response Interventions   Plan Of Care Reviewed With patient;spouse   Patient Care Overview   Progress progress toward functional goals as expected   Outcome Evaluation   Outcome Summary/Follow up Plan rested well, taking po pain meds with good pain relief         Problem: Cardiac Surgery (Adult)  Goal: Signs and Symptoms of Listed Potential Problems Will be Absent or Manageable (Cardiac Surgery)  Outcome: Ongoing (interventions implemented as appropriate)

## 2017-09-01 NOTE — PROGRESS NOTES
Adult Nutrition  Assessment/PES    Patient Name:  Denzel Harding  YOB: 1960  MRN: [id number]  Admit Date:  8/11/2017    Assessment Date:  9/1/2017        Reason for Assessment       09/01/17 0724    Reason for Assessment    Reason For Assessment/Visit follow up protocol    Time Spent (min) 5                                Problem/Interventions:          Problem 2       09/01/17 0724    Nutrition Diagnoses Problem 2    Problem 2 Nutrition Appropriate for Condition at this Time    Signs/Symptoms (evidenced by) PO Intake                        Education/Evaluation       09/01/17 0724    Monitor/Evaluation    Monitor Per protocol        Comments:  Electronically signed by:  Ni Dempsey RD  09/01/17 7:24 AM

## 2017-09-02 LAB
ANION GAP SERPL CALCULATED.3IONS-SCNC: 6 MMOL/L (ref 3–11)
BACTERIA BLD CULT: ABNORMAL
BACTERIA SPEC RESP CULT: ABNORMAL
BACTERIA SPEC RESP CULT: ABNORMAL
BASOPHILS # BLD AUTO: 0.04 10*3/MM3 (ref 0–0.2)
BASOPHILS NFR BLD AUTO: 0.5 % (ref 0–1)
BUN BLD-MCNC: 24 MG/DL (ref 9–23)
BUN/CREAT SERPL: 26.7 (ref 7–25)
CALCIUM SPEC-SCNC: 9.1 MG/DL (ref 8.7–10.4)
CHLORIDE SERPL-SCNC: 102 MMOL/L (ref 99–109)
CO2 SERPL-SCNC: 29 MMOL/L (ref 20–31)
CREAT BLD-MCNC: 0.9 MG/DL (ref 0.6–1.3)
DEPRECATED RDW RBC AUTO: 42.1 FL (ref 37–54)
EOSINOPHIL # BLD AUTO: 0.9 10*3/MM3 (ref 0–0.3)
EOSINOPHIL NFR BLD AUTO: 12.1 % (ref 0–3)
ERYTHROCYTE [DISTWIDTH] IN BLOOD BY AUTOMATED COUNT: 13.8 % (ref 11.3–14.5)
GFR SERPL CREATININE-BSD FRML MDRD: 87 ML/MIN/1.73
GLUCOSE BLD-MCNC: 164 MG/DL (ref 70–100)
GLUCOSE BLDC GLUCOMTR-MCNC: 166 MG/DL (ref 70–130)
GLUCOSE BLDC GLUCOMTR-MCNC: 235 MG/DL (ref 70–130)
GLUCOSE BLDC GLUCOMTR-MCNC: 238 MG/DL (ref 70–130)
GLUCOSE BLDC GLUCOMTR-MCNC: 277 MG/DL (ref 70–130)
GRAM STN SPEC: ABNORMAL
HCT VFR BLD AUTO: 27.9 % (ref 38.9–50.9)
HGB BLD-MCNC: 8.8 G/DL (ref 13.1–17.5)
IMM GRANULOCYTES # BLD: 0.07 10*3/MM3 (ref 0–0.03)
IMM GRANULOCYTES NFR BLD: 0.9 % (ref 0–0.6)
LYMPHOCYTES # BLD AUTO: 1.22 10*3/MM3 (ref 0.6–4.8)
LYMPHOCYTES NFR BLD AUTO: 16.5 % (ref 24–44)
MCH RBC QN AUTO: 26 PG (ref 27–31)
MCHC RBC AUTO-ENTMCNC: 31.5 G/DL (ref 32–36)
MCV RBC AUTO: 82.3 FL (ref 80–99)
MONOCYTES # BLD AUTO: 0.56 10*3/MM3 (ref 0–1)
MONOCYTES NFR BLD AUTO: 7.6 % (ref 0–12)
NEUTROPHILS # BLD AUTO: 4.62 10*3/MM3 (ref 1.5–8.3)
NEUTROPHILS NFR BLD AUTO: 62.4 % (ref 41–71)
PLATELET # BLD AUTO: 253 10*3/MM3 (ref 150–450)
PMV BLD AUTO: 8.5 FL (ref 6–12)
POTASSIUM BLD-SCNC: 5.2 MMOL/L (ref 3.5–5.5)
RBC # BLD AUTO: 3.39 10*6/MM3 (ref 4.2–5.76)
SODIUM BLD-SCNC: 137 MMOL/L (ref 132–146)
WBC NRBC COR # BLD: 7.41 10*3/MM3 (ref 3.5–10.8)

## 2017-09-02 PROCEDURE — 99233 SBSQ HOSP IP/OBS HIGH 50: CPT | Performed by: HOSPITALIST

## 2017-09-02 PROCEDURE — 25010000002 PIPERACILLIN-TAZOBACTAM: Performed by: PHYSICIAN ASSISTANT

## 2017-09-02 PROCEDURE — 99232 SBSQ HOSP IP/OBS MODERATE 35: CPT | Performed by: INTERNAL MEDICINE

## 2017-09-02 PROCEDURE — 80048 BASIC METABOLIC PNL TOTAL CA: CPT | Performed by: PHYSICIAN ASSISTANT

## 2017-09-02 PROCEDURE — 85025 COMPLETE CBC W/AUTO DIFF WBC: CPT | Performed by: PHYSICIAN ASSISTANT

## 2017-09-02 PROCEDURE — 25010000002 VANCOMYCIN HCL IN NACL 2-0.9 GM/500ML-% SOLUTION

## 2017-09-02 PROCEDURE — 99024 POSTOP FOLLOW-UP VISIT: CPT | Performed by: THORACIC SURGERY (CARDIOTHORACIC VASCULAR SURGERY)

## 2017-09-02 PROCEDURE — 94799 UNLISTED PULMONARY SVC/PX: CPT

## 2017-09-02 PROCEDURE — 82962 GLUCOSE BLOOD TEST: CPT

## 2017-09-02 PROCEDURE — 25010000002 VANCOMYCIN HCL IN NACL 1.5-0.9 GM/500ML-% SOLUTION

## 2017-09-02 RX ORDER — VANCOMYCIN/0.9 % SOD CHLORIDE 1.5G/250ML
1500 PLASTIC BAG, INJECTION (ML) INTRAVENOUS EVERY 12 HOURS
Status: DISCONTINUED | OUTPATIENT
Start: 2017-09-02 | End: 2017-09-03

## 2017-09-02 RX ORDER — SACCHAROMYCES BOULARDII 250 MG
250 CAPSULE ORAL 2 TIMES DAILY
Status: DISCONTINUED | OUTPATIENT
Start: 2017-09-02 | End: 2017-09-05 | Stop reason: HOSPADM

## 2017-09-02 RX ORDER — VANCOMYCIN 2 GRAM/500 ML IN 0.9 % SODIUM CHLORIDE INTRAVENOUS
2000 ONCE
Status: COMPLETED | OUTPATIENT
Start: 2017-09-02 | End: 2017-09-02

## 2017-09-02 RX ADMIN — INSULIN LISPRO 3 UNITS: 100 INJECTION, SOLUTION INTRAVENOUS; SUBCUTANEOUS at 17:16

## 2017-09-02 RX ADMIN — CETIRIZINE HYDROCHLORIDE 10 MG: 10 TABLET, FILM COATED ORAL at 08:41

## 2017-09-02 RX ADMIN — Medication 250 MG: at 17:16

## 2017-09-02 RX ADMIN — ASPIRIN 325 MG: 325 TABLET, DELAYED RELEASE ORAL at 08:41

## 2017-09-02 RX ADMIN — ATORVASTATIN CALCIUM 40 MG: 40 TABLET, FILM COATED ORAL at 20:05

## 2017-09-02 RX ADMIN — Medication 250 MG: at 08:40

## 2017-09-02 RX ADMIN — PIPERACILLIN SODIUM,TAZOBACTAM SODIUM 3.38 G: 3; .375 INJECTION, POWDER, FOR SOLUTION INTRAVENOUS at 16:59

## 2017-09-02 RX ADMIN — ACETAMINOPHEN 650 MG: 325 TABLET, FILM COATED ORAL at 20:08

## 2017-09-02 RX ADMIN — PIPERACILLIN SODIUM,TAZOBACTAM SODIUM 3.38 G: 3; .375 INJECTION, POWDER, FOR SOLUTION INTRAVENOUS at 20:06

## 2017-09-02 RX ADMIN — METOPROLOL TARTRATE 12.5 MG: 25 TABLET, FILM COATED ORAL at 08:41

## 2017-09-02 RX ADMIN — Medication 2000 MG: at 11:27

## 2017-09-02 RX ADMIN — TOPIRAMATE 100 MG: 100 TABLET, FILM COATED ORAL at 20:06

## 2017-09-02 RX ADMIN — INSULIN LISPRO 4 UNITS: 100 INJECTION, SOLUTION INTRAVENOUS; SUBCUTANEOUS at 20:06

## 2017-09-02 RX ADMIN — CYCLOBENZAPRINE HYDROCHLORIDE 10 MG: 10 TABLET, FILM COATED ORAL at 20:06

## 2017-09-02 RX ADMIN — Medication 3 ML: at 09:32

## 2017-09-02 RX ADMIN — DOCUSATE SODIUM 100 MG: 100 CAPSULE, LIQUID FILLED ORAL at 18:00

## 2017-09-02 RX ADMIN — INSULIN LISPRO 2 UNITS: 100 INJECTION, SOLUTION INTRAVENOUS; SUBCUTANEOUS at 08:43

## 2017-09-02 RX ADMIN — AMLODIPINE BESYLATE 5 MG: 5 TABLET ORAL at 08:41

## 2017-09-02 RX ADMIN — CYCLOBENZAPRINE HYDROCHLORIDE 10 MG: 10 TABLET, FILM COATED ORAL at 17:16

## 2017-09-02 RX ADMIN — Medication 250 MG: at 18:00

## 2017-09-02 RX ADMIN — METOPROLOL TARTRATE 12.5 MG: 25 TABLET, FILM COATED ORAL at 20:04

## 2017-09-02 RX ADMIN — COLCHICINE 0.6 MG: 0.6 TABLET, FILM COATED ORAL at 08:41

## 2017-09-02 RX ADMIN — Medication 3 ML: at 18:00

## 2017-09-02 RX ADMIN — PIPERACILLIN SODIUM,TAZOBACTAM SODIUM 3.38 G: 3; .375 INJECTION, POWDER, FOR SOLUTION INTRAVENOUS at 20:08

## 2017-09-02 RX ADMIN — PIPERACILLIN SODIUM,TAZOBACTAM SODIUM 3.38 G: 3; .375 INJECTION, POWDER, FOR SOLUTION INTRAVENOUS at 06:13

## 2017-09-02 RX ADMIN — PIPERACILLIN SODIUM,TAZOBACTAM SODIUM 3.38 G: 3; .375 INJECTION, POWDER, FOR SOLUTION INTRAVENOUS at 09:32

## 2017-09-02 RX ADMIN — GABAPENTIN 300 MG: 300 CAPSULE ORAL at 08:41

## 2017-09-02 RX ADMIN — GABAPENTIN 300 MG: 300 CAPSULE ORAL at 20:04

## 2017-09-02 RX ADMIN — INSULIN LISPRO 3 UNITS: 100 INJECTION, SOLUTION INTRAVENOUS; SUBCUTANEOUS at 12:59

## 2017-09-02 RX ADMIN — BUMETANIDE 2 MG: 1 TABLET ORAL at 08:40

## 2017-09-02 RX ADMIN — VANCOMYCIN HCL-SODIUM CHLORIDE IV SOLN 1.5 GM/250ML-0.9% 1500 MG: 1.5-0.9/25 SOLUTION at 21:45

## 2017-09-02 RX ADMIN — CYCLOBENZAPRINE HYDROCHLORIDE 10 MG: 10 TABLET, FILM COATED ORAL at 08:41

## 2017-09-02 RX ADMIN — PANTOPRAZOLE SODIUM 40 MG: 40 TABLET, DELAYED RELEASE ORAL at 06:13

## 2017-09-02 NOTE — PROGRESS NOTES
POD chief complaint shortness of breath       LOS: 22 days   Patient Care Team:  Ottoniel Toledo MD as PCP - General (Family Medicine)    Subjective  Doing okay, still requiring oxygen    Objective    Vital Signs  Temp:  [98.7 °F (37.1 °C)] 98.7 °F (37.1 °C)  Heart Rate:  [87-95] 95  Resp:  [16] 16  BP: (119-129)/(70) 129/70    Physical Exam:   General Appearance: alert, appears stated age and cooperative   Lungs: ungs mesh bilaterally, worse on the left   Heart: regular rhythm & normal rate, normal S1, S2, no murmur, no reid, no rub and no click   Skin: Sternal Incision c/d/i     Results     Results from last 7 days  Lab Units 09/02/17  0423   WBC 10*3/mm3 7.41   HEMOGLOBIN g/dL 8.8*   HEMATOCRIT % 27.9*   PLATELETS 10*3/mm3 253       Results from last 7 days  Lab Units 09/02/17  0423   SODIUM mmol/L 137   POTASSIUM mmol/L 5.2   CHLORIDE mmol/L 102   CO2 mmol/L 29.0   BUN mg/dL 24*   CREATININE mg/dL 0.90   GLUCOSE mg/dL 164*   CALCIUM mg/dL 9.1           Imaging Results (last 24 hours)     Procedure Component Value Units Date/Time    XR Chest PA & Lateral [[id number]] Collected:  09/01/17 1613     Updated:  09/01/17 1911    Narrative:       EXAMINATION: XR CHEST PA AND LATERAL - 09/01/2017     INDICATION: I25.110-Atherosclerotic heart disease of native coronary  artery with unstable angina pectoris; Z74.09-Other reduced mobility;  R06.09-Other forms of dyspnea; E11.42-Type 2 diabetes mellitus with  diabetic polyneuropathy; E11.8-Type 2 diabetes mellitus with unspecified  complications; I31.3-Pericardial effusion (noninflammatory).      COMPARISON: NONE     FINDINGS:   1. There is volume reduction at both bases. There is atelectatic opacity  at the bases with small bibasilar effusions.     2. Right PICC line is well-positioned with the tip in the SVC above the  right atrium. The mid and upper lung zones are clear.           Impression:       Compared to previous studies of two days ago, there is a  slight  increase in the amount of atelectatic density and minimal  effusion at the right lung base by comparison over the interval. The  atelectatic parenchymal density at the left base with effusion is  stable. Mid and upper lung zones remain clear and there has been no  change in the position of the right PICC line which is well-positioned.     DICTATED:     09/01/2017  EDITED:         09/01/2017     This report was finalized on 9/1/2017 7:09 PM by Dr. Archie Castañeda MD.             Assessment    Active Problems:    Hypercholesterolemia    Diabetic polyneuropathy associated with type 2 diabetes mellitus    Seizure disorder    GERD (gastroesophageal reflux disease)    Essential hypertension    History of CVA (cerebrovascular accident)    Non morbid obesity due to excess calories    Post-infarction pericarditis    HCAP (healthcare-associated pneumonia)        Plan   He had some positive respiratory cultures and was placed on IV antibiotics yesterday.  He is remained in the hospital for cultures are pending, plus blood cultures.  Await medical evaluation and plan    Harry Hunter PA-C  09/02/17  7:31 AM

## 2017-09-02 NOTE — PROGRESS NOTES
[facility] Medicine Services  INPATIENT PROGRESS NOTE    Date of Admission: 8/11/2017  Length of Stay: 22  Primary Care Physician: Ottoniel Toledo MD    Subjective   CC: Follow-up med management post CABG     HPI:  Having chest soreness. Cough/shortness of breath, with yellow/green sputum. No n/v. Some anorexia. +BM. No f/c. No sweats. Just tired.    Review of Systems   Constitutional: Positive for chills. Negative for appetite change, diaphoresis and fever.        Malaise   HENT: Positive for congestion.    Respiratory: Positive for cough and shortness of breath.    Cardiovascular: Positive for leg swelling. Negative for chest pain and palpitations.   Gastrointestinal: Negative for constipation, diarrhea, nausea and vomiting.   Genitourinary: Negative for decreased urine volume and difficulty urinating.   Musculoskeletal: Negative for myalgias.   All other systems reviewed and are negative.         Objective      Temp:  [98.7 °F (37.1 °C)] 98.7 °F (37.1 °C)  Heart Rate:  [87-95] 95  Resp:  [16] 16  BP: (119-129)/(70) 129/70     Physical Exam  Constitutional: resting in chair, appears comfortable, in no acute distress  Respiratory:  Diminished throughout, fine crackles in bases bilat; no rhonchi or wheezing, Non-labored respirations.  Cardiovascular: RRR, no murmurs, rubs, or gallops, palpable pedal pulses bilaterally. Sternal incision, and EVH site c/d/i without erythema, induration or drainage. MT Drsg intact  Gastrointestinal: Positive bowel sounds, soft, nontender, protuberant  Musculoskeletal: BLE 1+  edema  Psychiatric: oriented x 3, appropriate affect, cooperative, and calm   Neurologic: speech is clear, follows commands, active ROM bilat UE/LE  Skin: warm and dry, sternal wound GUNNAR and healing w/o erythema     NAD, sitting up in chair  Neck supple  Tachy  Decreased bases bilaterally, worse on R  Sternal wound clean, no erythema  +BS, ND, NT  GUNN  1+ B LE edema  Normal  affect    Results Review:    I have reviewed the labs, radiology results and diagnostic studies.      Results from last 7 days  Lab Units 09/02/17  0423   WBC 10*3/mm3 7.41   HEMOGLOBIN g/dL 8.8*   PLATELETS 10*3/mm3 253       Results from last 7 days  Lab Units 09/02/17  0423   SODIUM mmol/L 137   POTASSIUM mmol/L 5.2   CHLORIDE mmol/L 102   CO2 mmol/L 29.0   BUN mg/dL 24*   CREATININE mg/dL 0.90   GLUCOSE mg/dL 164*   CALCIUM mg/dL 9.1       Respiratory Culture [[id number]] (Abnormal) Collected: 08/31/17 0918       Lab Status: Preliminary result Specimen: Sputum from Cough Updated: 09/01/17 0826        Respiratory Culture Light growth (2+) Gram Negative Bacilli (A)         Light growth (2+) Normal Respiratory Ana M        Gram Stain Result Many (4+) WBCs per low power field         Few (2+) Epithelial cells per low power field         Many (4+) Gram positive cocci in pairs and chains       Anaerobic Culture [[id number]] (Normal) Collected: 08/25/17 1617       Lab Status: Final result Specimen: Body Fluid from Pericardium Updated: 09/01/17 1423        Culture No anaerobes isolated       Narrative:         CONTAINER SUBMITTED TO MICROBIOLOGY HAS NO PATIENT INFORMATION ON IT: INTERPRET RESULTS ACCORDINGLY       Fungus Culture [[id number]] Collected: 08/25/17 1617       Lab Status: Preliminary result Specimen: Body Fluid from Pericardium Updated: 09/01/17 1101        Fungus Culture No fungus isolated at less than 1 week       Narrative:         CONTAINER SUBMITTED TO MICROBIOLOGY HAS NO PATIENT INFORMATION ON IT: INTERPRET RESULTS ACCORDINGLY       Body Fluid Culture [[id number]] (Normal) Collected: 08/25/17 1617       Lab Status: Final result Specimen: Body Fluid from Pericardium Updated: 08/29/17 0647        BF Culture No growth at 4 days        Gram Stain Result Few (2+) WBCs seen         No organisms seen       Narrative:         CONTAINER SUBMITTED TO MICROBIOLOGY HAS NO PATIENT INFORMATION ON IT: INTERPRET RESULTS  ACCORDINGLY       AFB Culture [[id number]] (Normal) Collected: 08/25/17 1617       Lab Status: Preliminary result Specimen: Body Fluid from Pericardium Updated: 08/30/17 1901        AFB Culture No AFB isolated at less than 1 week        AFB Stain No acid fast bacilli seen on concentrated smear       Narrative:         CONTAINER SUBMITTED TO MICROBIOLOGY HAS NO PATIENT INFORMATION ON IT: INTERPRET RESULTS ACCORDINGLY       Urine Culture [[id number]] (Normal) Collected: 08/15/17 1516       Lab Status: Final result Specimen: Urine from Urine, Catheter Updated: 08/17/17 0736        Urine Culture No growth at 2 days         Ref Range & Units 08/31/17 2137   08/31/17 1717   08/31/17 1616   08/31/17 1115   08/31/17 0740       Glucose 70 - 130 mg/dL 97 93 84 137 (H) 125       Narrative        Radiology Data:   Imaging Results (last 24 hours)     Procedure Component Value Units Date/Time    XR Chest PA & Lateral [[id number]] Collected:  09/01/17 1613     Updated:  09/01/17 1911    Narrative:       EXAMINATION: XR CHEST PA AND LATERAL - 09/01/2017     INDICATION: I25.110-Atherosclerotic heart disease of native coronary  artery with unstable angina pectoris; Z74.09-Other reduced mobility;  R06.09-Other forms of dyspnea; E11.42-Type 2 diabetes mellitus with  diabetic polyneuropathy; E11.8-Type 2 diabetes mellitus with unspecified  complications; I31.3-Pericardial effusion (noninflammatory).      COMPARISON: NONE     FINDINGS:   1. There is volume reduction at both bases. There is atelectatic opacity  at the bases with small bibasilar effusions.     2. Right PICC line is well-positioned with the tip in the SVC above the  right atrium. The mid and upper lung zones are clear.           Impression:       Compared to previous studies of two days ago, there is a  slight increase in the amount of atelectatic density and minimal  effusion at the right lung base by comparison over the interval. The  atelectatic parenchymal density at the  "left base with effusion is  stable. Mid and upper lung zones remain clear and there has been no  change in the position of the right PICC line which is well-positioned.     DICTATED:     09/01/2017  EDITED:         09/01/2017     This report was finalized on 9/1/2017 7:09 PM by Dr. Archie Castañeda MD.             I have reviewed the medications.    Assessment/Plan     Problem List  Hospital Problem List     Hypercholesterolemia    Overview Signed 11/2/2016  9:34 AM by Shannon Torres      Assessed By: Yasmin Millard (Cardiology); Last Assessed: 13 Aug 2015         Diabetic polyneuropathy associated with type 2 diabetes mellitus    Seizure disorder    GERD (gastroesophageal reflux disease)    Essential hypertension    History of CVA (cerebrovascular accident)    Non morbid obesity due to excess calories    Post-infarction pericarditis    HCAP (healthcare-associated pneumonia)           Mr. Harding is a 56yo male with multivessel CAD with LHC by Dr. Kim on 8/11/17 and CABG x3 by Dr. Damon on 8/15/17. Developed postop pericarditis w/effusion s/p left thoracotomy and pericardial window 8/25/17. Hospitalist Consulted upon transfer to acute care by CTS,  for medical management.       PER CT SURGERY/CARDIOLOGY  --Multivessel CAD s/p CABG x 3 on 8/15/17  Dr. Damon  --Post op pericarditis w/effusion s/p left anterior thoracotomy and pericardial window 8/25/17  --Hypertension, well controlled  --Diuresis per cards      Insulin-dependent DMII with peripheral neuropathy  - Hgb A1c this admission 8.9%  - He reports blood sugars at home are \"not good\" usually 200-300 range   - Monitor glucose, titrate insulin as need, but poor appetite      HCAP PNA, gram negative pneumonia  --continues with sputum output  --cxr in am, continue zosyn, +Klebsiella  --added probiotic      Seizure disorder, stable continue Topamax     GERD, continue PPI  History of CVA, without residual. Continue ASA and statin   Morbid obesity (BMI 39), continue " to encourage dietary compliance and lifestyle modification       Recommend keep additional day for further monitoring, await final resp cx.     Jason Petit MD   09/02/17   7:49 AM

## 2017-09-02 NOTE — PROGRESS NOTES
Pharmacy Consult-Vancomycin Dosing  Denzel Harding is a  57 y.o. male receiving vancomycin therapy. 9/1 blood culture positive (1 of 4 bottles) with GPCs in groups. BCID shows Staph spp not aureus, concerning for possible contamination.    Patient developed post op pericarditis with effusion s/p left anterior thoracotomy and pericardial window 8/25/17. Also with HCAP and is on piperacillin/tazobactam for Klebsiella pneumoniae.     Indication: bacteremia  Consulting Provider: hospitalist    Goal Trough: 15-20 mcg/ml      Current Antimicrobial Therapy  IV Anti-Infectives     Ordered     Dose/Rate Route Frequency Start Stop    09/02/17 0959  vancomycin IVPB 2000 mg in 0.9% Sodium Chloride (premix) 500 mL     Ordering Provider:  Sherry Serrato RPH    2,000 mg  over 120 Minutes Intravenous Once 09/02/17 1030      09/02/17 0950  Pharmacy to dose vancomycin     Ordering Provider:  Jason Petit MD     Does not apply Continuous PRN 09/02/17 0949      09/01/17 1446  piperacillin-tazobactam (ZOSYN) 3.375 g/100 mL 0.9% NS IVPB (mbp)     Ordering Provider:  Casie M Mayne, PA-C    3.375 g Intravenous Every 6 Hours 09/01/17 1600      08/25/17 1710  cefuroxime (ZINACEF) IVPB 1.5 g     Ordering Provider:  LARRY De Los Santos    1.5 g Intravenous Every 8 Hours 08/26/17 0000 08/26/17 0831    08/25/17 1259  cefuroxime (ZINACEF) IVPB 1.5 g     Ordering Provider:  LARRY Erazo    1.5 g Intravenous 60 Minutes Pre-Op 08/25/17 1330 08/25/17 1600    08/15/17 1519  cefuroxime (ZINACEF) 1.5 g/100 mL 0.9% NS IVPB (mbp)     Ordering Provider:  London Damon MD    1.5 g  over 30 Minutes Intravenous Every 8 Hours 08/15/17 2000 08/17/17 0419          Allergies  Sulfa antibiotics; Codeine; and Morphine    Labs      Results from last 7 days  Lab Units 09/02/17  0423 09/01/17  1553 08/31/17  0451   BUN mg/dL 24* 29* 28*   CREATININE mg/dL 0.90 0.90 0.90         Results from last 7 days  Lab Units 09/02/17  0423 09/01/17  1559  "08/29/17  0403   WBC 10*3/mm3 7.41 8.69 9.92       Max Temperature in Last 24 Hours:    Evaluation of Dosing     Ht - 70\" (177.8 cm)  Wt - 298 lb 1 oz (135 kg)    Estimated Creatinine Clearance: 126.8 mL/min (by C-G formula based on Cr of 0.9).    I/O last 3 completed shifts:  In: 1620 [P.O.:1320; IV Piggyback:300]  Out: 1300 [Urine:1300]     Microbiology and Radiology  Blood Culture   Date Value Ref Range Status   09/01/2017 Abnormal Stain (A)  Preliminary   09/01/2017 No growth at less than 24 hours  Preliminary      Respiratory culture:K. pneumoniae    Evaluation of Level      Assessment/Plan:  Patient loaded with vancomycin 2000 mg IV x 1 dose, then plan to initiate vancomycin 1500 mg IV q12h.  Will obtain trough on 9/4 @ 0930, prior to 5th total dose of vancomycin.    Pharmacy will continue to follow and adjust plan as needed.    Thank you for this consult,  Sherry Serrato, PharmD  09/02/17  10:54 AM      "

## 2017-09-02 NOTE — PROGRESS NOTES
"[facility] at [facility]  IP Progress Note      Chief Complaint: CHF, htn, pericarditis       Subjective:  Reporting mild dyspnea on exertion today.   slept    Objective:  Blood pressure 123/68, pulse 99, temperature 98.3 °F (36.8 °C), temperature source Oral, resp. rate 20, height 70\" (177.8 cm), weight 298 lb 1 oz (135 kg), SpO2 96 %.     Intake/Output Summary (Last 24 hours) at 09/02/17 0906  Last data filed at 09/02/17 0613   Gross per 24 hour   Intake             1260 ml   Output              900 ml   Net              360 ml       Physical Exam:  General: No acute distress.   Neck: no JVD.  Chest:normal effort, no wheezing or rhonchi, Diminished throughout  Cardiovascular: Normal S1-S2, no murmurs rubs or gallops   Extremities: Trivial bilateral pretibial edema    Results Review:     I reviewed the patient's new clinical results.      Results from last 7 days  Lab Units 09/02/17  0423   WBC 10*3/mm3 7.41   HEMOGLOBIN g/dL 8.8*   HEMATOCRIT % 27.9*   PLATELETS 10*3/mm3 253       Results from last 7 days  Lab Units 09/02/17  0423   SODIUM mmol/L 137   POTASSIUM mmol/L 5.2   CHLORIDE mmol/L 102   CO2 mmol/L 29.0   BUN mg/dL 24*   CREATININE mg/dL 0.90   CALCIUM mg/dL 9.1   GLUCOSE mg/dL 164*       Results from last 7 days  Lab Units 09/02/17  0423   SODIUM mmol/L 137   POTASSIUM mmol/L 5.2   CHLORIDE mmol/L 102   CO2 mmol/L 29.0   BUN mg/dL 24*   CREATININE mg/dL 0.90   GLUCOSE mg/dL 164*   CALCIUM mg/dL 9.1         Lab Results   Component Value Date    CKTOTAL 66 04/11/2017    CKMB 0.5 11/28/2015    CKMBINDEX CKMB Index not calculated when CK Total <80 11/28/2015    TROPONINI <0.006 08/13/2017                   Tele: Sinus Rhythm       Assessment/Plan:  1. Post op pericarditis with effusion s/p left anterior thoracotomy and pericardial window 8/25/17  2. CABG x 3, LAD endarterectomy 8/15/17 Dr. Damon, Normal EF, DHF  3. HTN: Controlled, BB and Norvasc  4. CVA: Remote  5. DM  6. " COPD/EDD  7. HLD: Statin    PLAN:  · Continue current medical therapy gradual diuresis  · Switched Lasix to Bumex 9/1 + I/o per charting/24 hr but put attests to BR use wo measurement  · Ambulate, pulmonary toilet  · On IV abx for resp cx +  · Home soon once off iv abx and to po diuretics

## 2017-09-02 NOTE — PLAN OF CARE
Problem: Patient Care Overview (Adult)  Goal: Adult Individualization and Mutuality  Outcome: Ongoing (interventions implemented as appropriate)    09/02/17 0741   Individualization   Patient Specific Goals pt will walk at least 3 times today   Patient Specific Interventions assist patient to walk

## 2017-09-02 NOTE — PLAN OF CARE
Problem: Patient Care Overview (Adult)  Goal: Plan of Care Review  Outcome: Ongoing (interventions implemented as appropriate)    09/02/17 0234   Coping/Psychosocial Response Interventions   Plan Of Care Reviewed With patient;spouse   Patient Care Overview   Progress improving   Outcome Evaluation   Outcome Summary/Follow up Plan Pt. reports no SOA or fullness in abdomen. He is resting comfortably with no pain reported

## 2017-09-03 ENCOUNTER — APPOINTMENT (OUTPATIENT)
Dept: GENERAL RADIOLOGY | Facility: HOSPITAL | Age: 57
End: 2017-09-03

## 2017-09-03 LAB
ANION GAP SERPL CALCULATED.3IONS-SCNC: 7 MMOL/L (ref 3–11)
BASOPHILS # BLD AUTO: 0.04 10*3/MM3 (ref 0–0.2)
BASOPHILS NFR BLD AUTO: 0.8 % (ref 0–1)
BUN BLD-MCNC: 14 MG/DL (ref 9–23)
BUN/CREAT SERPL: 17.5 (ref 7–25)
CALCIUM SPEC-SCNC: 9 MG/DL (ref 8.7–10.4)
CHLORIDE SERPL-SCNC: 104 MMOL/L (ref 99–109)
CO2 SERPL-SCNC: 26 MMOL/L (ref 20–31)
CREAT BLD-MCNC: 0.8 MG/DL (ref 0.6–1.3)
DEPRECATED RDW RBC AUTO: 41.1 FL (ref 37–54)
EOSINOPHIL # BLD AUTO: 0.49 10*3/MM3 (ref 0–0.3)
EOSINOPHIL NFR BLD AUTO: 9.5 % (ref 0–3)
ERYTHROCYTE [DISTWIDTH] IN BLOOD BY AUTOMATED COUNT: 13.6 % (ref 11.3–14.5)
GFR SERPL CREATININE-BSD FRML MDRD: 100 ML/MIN/1.73
GLUCOSE BLD-MCNC: 217 MG/DL (ref 70–100)
GLUCOSE BLDC GLUCOMTR-MCNC: 224 MG/DL (ref 70–130)
GLUCOSE BLDC GLUCOMTR-MCNC: 262 MG/DL (ref 70–130)
GLUCOSE BLDC GLUCOMTR-MCNC: 296 MG/DL (ref 70–130)
GLUCOSE BLDC GLUCOMTR-MCNC: 339 MG/DL (ref 70–130)
HCT VFR BLD AUTO: 26.1 % (ref 38.9–50.9)
HGB BLD-MCNC: 8.5 G/DL (ref 13.1–17.5)
IMM GRANULOCYTES # BLD: 0.09 10*3/MM3 (ref 0–0.03)
IMM GRANULOCYTES NFR BLD: 1.7 % (ref 0–0.6)
LYMPHOCYTES # BLD AUTO: 0.84 10*3/MM3 (ref 0.6–4.8)
LYMPHOCYTES NFR BLD AUTO: 16.2 % (ref 24–44)
MAGNESIUM SERPL-MCNC: 1.9 MG/DL (ref 1.3–2.7)
MCH RBC QN AUTO: 26.6 PG (ref 27–31)
MCHC RBC AUTO-ENTMCNC: 32.6 G/DL (ref 32–36)
MCV RBC AUTO: 81.6 FL (ref 80–99)
MONOCYTES # BLD AUTO: 0.48 10*3/MM3 (ref 0–1)
MONOCYTES NFR BLD AUTO: 9.3 % (ref 0–12)
NEUTROPHILS # BLD AUTO: 3.24 10*3/MM3 (ref 1.5–8.3)
NEUTROPHILS NFR BLD AUTO: 62.5 % (ref 41–71)
PLATELET # BLD AUTO: 205 10*3/MM3 (ref 150–450)
PMV BLD AUTO: 8.3 FL (ref 6–12)
POTASSIUM BLD-SCNC: 3.9 MMOL/L (ref 3.5–5.5)
RBC # BLD AUTO: 3.2 10*6/MM3 (ref 4.2–5.76)
SODIUM BLD-SCNC: 137 MMOL/L (ref 132–146)
WBC NRBC COR # BLD: 5.18 10*3/MM3 (ref 3.5–10.8)

## 2017-09-03 PROCEDURE — 71010 HC CHEST PA OR AP: CPT

## 2017-09-03 PROCEDURE — 99233 SBSQ HOSP IP/OBS HIGH 50: CPT | Performed by: HOSPITALIST

## 2017-09-03 PROCEDURE — 83735 ASSAY OF MAGNESIUM: CPT

## 2017-09-03 PROCEDURE — 80048 BASIC METABOLIC PNL TOTAL CA: CPT | Performed by: HOSPITALIST

## 2017-09-03 PROCEDURE — 63710000001 INSULIN DETEMIR PER 5 UNITS: Performed by: HOSPITALIST

## 2017-09-03 PROCEDURE — 25010000002 LEVOFLOXACIN PER 250 MG

## 2017-09-03 PROCEDURE — 25010000002 PIPERACILLIN-TAZOBACTAM: Performed by: PHYSICIAN ASSISTANT

## 2017-09-03 PROCEDURE — 82962 GLUCOSE BLOOD TEST: CPT

## 2017-09-03 PROCEDURE — 99024 POSTOP FOLLOW-UP VISIT: CPT | Performed by: THORACIC SURGERY (CARDIOTHORACIC VASCULAR SURGERY)

## 2017-09-03 PROCEDURE — 85025 COMPLETE CBC W/AUTO DIFF WBC: CPT | Performed by: HOSPITALIST

## 2017-09-03 RX ORDER — LEVOFLOXACIN 5 MG/ML
750 INJECTION, SOLUTION INTRAVENOUS EVERY 24 HOURS
Status: DISCONTINUED | OUTPATIENT
Start: 2017-09-03 | End: 2017-09-05

## 2017-09-03 RX ORDER — SACCHAROMYCES BOULARDII 250 MG
250 CAPSULE ORAL 2 TIMES DAILY
Status: DISCONTINUED | OUTPATIENT
Start: 2017-09-03 | End: 2017-09-03 | Stop reason: SDUPTHER

## 2017-09-03 RX ADMIN — CYCLOBENZAPRINE HYDROCHLORIDE 10 MG: 10 TABLET, FILM COATED ORAL at 22:21

## 2017-09-03 RX ADMIN — CETIRIZINE HYDROCHLORIDE 10 MG: 10 TABLET, FILM COATED ORAL at 08:46

## 2017-09-03 RX ADMIN — PIPERACILLIN SODIUM,TAZOBACTAM SODIUM 3.38 G: 3; .375 INJECTION, POWDER, FOR SOLUTION INTRAVENOUS at 04:58

## 2017-09-03 RX ADMIN — LEVOFLOXACIN 750 MG: 750 INJECTION, SOLUTION INTRAVENOUS at 11:49

## 2017-09-03 RX ADMIN — GABAPENTIN 300 MG: 300 CAPSULE ORAL at 08:46

## 2017-09-03 RX ADMIN — POTASSIUM CHLORIDE 20 MEQ: 750 CAPSULE, EXTENDED RELEASE ORAL at 08:46

## 2017-09-03 RX ADMIN — METOPROLOL TARTRATE 25 MG: 25 TABLET ORAL at 22:21

## 2017-09-03 RX ADMIN — INSULIN DETEMIR 6 UNITS: 100 INJECTION, SOLUTION SUBCUTANEOUS at 08:48

## 2017-09-03 RX ADMIN — ASPIRIN 325 MG: 325 TABLET, DELAYED RELEASE ORAL at 08:46

## 2017-09-03 RX ADMIN — BUMETANIDE 2 MG: 1 TABLET ORAL at 08:46

## 2017-09-03 RX ADMIN — METOPROLOL TARTRATE 12.5 MG: 25 TABLET, FILM COATED ORAL at 08:46

## 2017-09-03 RX ADMIN — INSULIN LISPRO 5 UNITS: 100 INJECTION, SOLUTION INTRAVENOUS; SUBCUTANEOUS at 22:24

## 2017-09-03 RX ADMIN — GABAPENTIN 300 MG: 300 CAPSULE ORAL at 22:21

## 2017-09-03 RX ADMIN — TOPIRAMATE 100 MG: 100 TABLET, FILM COATED ORAL at 22:28

## 2017-09-03 RX ADMIN — INSULIN LISPRO 4 UNITS: 100 INJECTION, SOLUTION INTRAVENOUS; SUBCUTANEOUS at 17:05

## 2017-09-03 RX ADMIN — COLCHICINE 0.6 MG: 0.6 TABLET, FILM COATED ORAL at 08:46

## 2017-09-03 RX ADMIN — Medication 250 MG: at 17:05

## 2017-09-03 RX ADMIN — Medication 250 MG: at 08:46

## 2017-09-03 RX ADMIN — Medication 10 ML: at 08:45

## 2017-09-03 RX ADMIN — ATORVASTATIN CALCIUM 40 MG: 40 TABLET, FILM COATED ORAL at 22:21

## 2017-09-03 RX ADMIN — AMLODIPINE BESYLATE 5 MG: 5 TABLET ORAL at 08:46

## 2017-09-03 RX ADMIN — INSULIN LISPRO 4 UNITS: 100 INJECTION, SOLUTION INTRAVENOUS; SUBCUTANEOUS at 11:49

## 2017-09-03 RX ADMIN — INSULIN LISPRO 3 UNITS: 100 INJECTION, SOLUTION INTRAVENOUS; SUBCUTANEOUS at 08:46

## 2017-09-03 RX ADMIN — ACETAMINOPHEN 650 MG: 325 TABLET, FILM COATED ORAL at 00:42

## 2017-09-03 RX ADMIN — CYCLOBENZAPRINE HYDROCHLORIDE 10 MG: 10 TABLET, FILM COATED ORAL at 08:46

## 2017-09-03 RX ADMIN — PANTOPRAZOLE SODIUM 40 MG: 40 TABLET, DELAYED RELEASE ORAL at 08:46

## 2017-09-03 NOTE — PROGRESS NOTES
[facility]  INPATIENT PROGRESS NOTE    Date of Admission: 8/11/2017  Length of Stay: 23  Primary Care Physician: Ottoniel Toledo MD    Subjective   CC: Follow-up med management post CABG     HPI:  Having chest soreness. Less short of breath, but continues with yellow/green sputum. No f/c. Poor appetite. No dysuria. Normal BM. Just fatigued/very tired.    Review of Systems   Constitutional: Positive for chills. Negative for appetite change, diaphoresis and fever.        Malaise   HENT: Positive for congestion.    Respiratory: Positive for cough and shortness of breath.    Cardiovascular: Positive for leg swelling. Negative for chest pain and palpitations.   Gastrointestinal: Negative for constipation, diarrhea, nausea and vomiting.   Genitourinary: Negative for decreased urine volume and difficulty urinating.   Musculoskeletal: Negative for myalgias.   All other systems reviewed and are negative.         Objective      Temp:  [98.7 °F (37.1 °C)-100.7 °F (38.2 °C)] 98.7 °F (37.1 °C)  Heart Rate:  [] 107  Resp:  [16-18] 18  BP: (123-147)/(64-74) 147/74     Physical Exam     NAD, sitting up in chair  Neck supple  Tachy  Decreased bases bilaterally, but improved BS, only slightly decreased now  Sternal wound clean, no erythema  +BS, ND, NT  GUNN  1+ B LE edema  Normal affect    Results Review:    I have reviewed the labs, radiology results and diagnostic studies.      Results from last 7 days  Lab Units 09/03/17  0440   WBC 10*3/mm3 5.18   HEMOGLOBIN g/dL 8.5*   PLATELETS 10*3/mm3 205       Results from last 7 days  Lab Units 09/03/17  0440   SODIUM mmol/L 137   POTASSIUM mmol/L 3.9   CHLORIDE mmol/L 104   CO2 mmol/L 26.0   BUN mg/dL 14   CREATININE mg/dL 0.80   GLUCOSE mg/dL 217*   CALCIUM mg/dL 9.0       Respiratory Culture [[id number]] (Abnormal) Collected: 08/31/17 0918       Lab Status: Preliminary result Specimen: Sputum from Cough Updated: 09/01/17 1009         Respiratory Culture Light growth (2+) Gram Negative Bacilli (A)         Light growth (2+) Normal Respiratory Ana M        Gram Stain Result Many (4+) WBCs per low power field         Few (2+) Epithelial cells per low power field         Many (4+) Gram positive cocci in pairs and chains       Anaerobic Culture [[id number]] (Normal) Collected: 08/25/17 1617       Lab Status: Final result Specimen: Body Fluid from Pericardium Updated: 09/01/17 1423        Culture No anaerobes isolated       Narrative:         CONTAINER SUBMITTED TO MICROBIOLOGY HAS NO PATIENT INFORMATION ON IT: INTERPRET RESULTS ACCORDINGLY       Fungus Culture [[id number]] Collected: 08/25/17 1617       Lab Status: Preliminary result Specimen: Body Fluid from Pericardium Updated: 09/01/17 1101        Fungus Culture No fungus isolated at less than 1 week       Narrative:         CONTAINER SUBMITTED TO MICROBIOLOGY HAS NO PATIENT INFORMATION ON IT: INTERPRET RESULTS ACCORDINGLY       Body Fluid Culture [[id number]] (Normal) Collected: 08/25/17 1617       Lab Status: Final result Specimen: Body Fluid from Pericardium Updated: 08/29/17 0647        BF Culture No growth at 4 days        Gram Stain Result Few (2+) WBCs seen         No organisms seen       Narrative:         CONTAINER SUBMITTED TO MICROBIOLOGY HAS NO PATIENT INFORMATION ON IT: INTERPRET RESULTS ACCORDINGLY       AFB Culture [[id number]] (Normal) Collected: 08/25/17 1617       Lab Status: Preliminary result Specimen: Body Fluid from Pericardium Updated: 08/30/17 1901        AFB Culture No AFB isolated at less than 1 week        AFB Stain No acid fast bacilli seen on concentrated smear       Narrative:         CONTAINER SUBMITTED TO MICROBIOLOGY HAS NO PATIENT INFORMATION ON IT: INTERPRET RESULTS ACCORDINGLY       Urine Culture [[id number]] (Normal) Collected: 08/15/17 1516       Lab Status: Final result Specimen: Urine from Urine, Catheter Updated: 08/17/17 0736        Urine Culture No growth at 2  "days         Ref Range & Units 08/31/17 2137   08/31/17 1717   08/31/17 1616   08/31/17 1115   08/31/17 0740       Glucose 70 - 130 mg/dL 97 93 84 137 (H) 125       Narrative        Radiology Data:   Imaging Results (last 24 hours)     Procedure Component Value Units Date/Time    XR Chest 1 View [[id number]] Updated:  09/03/17 0434          I have reviewed the medications.    Assessment/Plan     Problem List  Hospital Problem List     Hypercholesterolemia    Overview Signed 11/2/2016  9:34 AM by Shannon Torres      Assessed By: Yasmin Millard (Cardiology); Last Assessed: 13 Aug 2015         Diabetic polyneuropathy associated with type 2 diabetes mellitus    Seizure disorder    GERD (gastroesophageal reflux disease)    Essential hypertension    History of CVA (cerebrovascular accident)    Non morbid obesity due to excess calories    Post-infarction pericarditis    HCAP (healthcare-associated pneumonia)           Mr. Harding is a 58yo male with multivessel CAD with LHC by Dr. Kim on 8/11/17 and CABG x3 by Dr. Damon on 8/15/17. Developed postop pericarditis w/effusion s/p left thoracotomy and pericardial window 8/25/17. Hospitalist Consulted upon transfer to acute care by CTS,  for medical management.       PER CT SURGERY/CARDIOLOGY  --Multivessel CAD s/p CABG x 3 on 8/15/17  Dr. Damon  --Post op pericarditis w/effusion s/p left anterior thoracotomy and pericardial window 8/25/17  --Hypertension, well controlled  --Diuresis per cards      Insulin-dependent DMII with peripheral neuropathy  - Hgb A1c this admission 8.9%  - He reports blood sugars at home are \"not good\" usually 200-300 range   - Monitor glucose, titrate insulin as need, but poor appetite      HCAP PNA, gram negative pneumonia  --change to PO levaquin    +Blood culture  --suspected contaminant, but await final culture      Seizure disorder, stable continue Topamax     GERD, continue PPI  History of CVA, without residual. Continue ASA and statin "   Morbid obesity (BMI 39), continue to encourage dietary compliance and lifestyle modification       Recommend keep additional day for further monitoring, await final resp cx.     Jason Petit MD   09/03/17   7:42 AM

## 2017-09-03 NOTE — PLAN OF CARE
Problem: Patient Care Overview (Adult)  Goal: Plan of Care Review  Outcome: Ongoing (interventions implemented as appropriate)    09/03/17 0348   Coping/Psychosocial Response Interventions   Plan Of Care Reviewed With patient   Patient Care Overview   Progress improving   Outcome Evaluation   Outcome Summary/Follow up Plan Pt has been resting comfortably during the night.

## 2017-09-03 NOTE — PLAN OF CARE
Problem: Patient Care Overview (Adult)  Goal: Plan of Care Review  Outcome: Ongoing (interventions implemented as appropriate)    Problem: Cardiac Surgery (Adult)  Goal: Signs and Symptoms of Listed Potential Problems Will be Absent or Manageable (Cardiac Surgery)  Outcome: Ongoing (interventions implemented as appropriate)    Problem: Perioperative Period (Adult)  Goal: Signs and Symptoms of Listed Potential Problems Will be Absent or Manageable (Perioperative Period)  Outcome: Ongoing (interventions implemented as appropriate)

## 2017-09-03 NOTE — PROGRESS NOTES
[facility]       LOS: 23 days   Patient Care Team:  Ottoniel Toledo MD as PCP - General (Family Medicine)        Subjective       Patient Denies:  Sob, palps.  Some chest pain with deep inspiration      Vital Signs  Temp:  [98.7 °F (37.1 °C)-100.7 °F (38.2 °C)] 98.9 °F (37.2 °C)  Heart Rate:  [] 94  Resp:  [16-18] 16  BP: (126-154)/(64-84) 154/84    Intake/Output Summary (Last 24 hours) at 09/03/17 1105  Last data filed at 09/03/17 1030   Gross per 24 hour   Intake             1640 ml   Output             1500 ml   Net              140 ml     I/O this shift:  In: 240 [P.O.:240]  Out: -     Physical Exam:     General Appearance:    Alert, cooperative, in no acute distress       Neck:   No adenopathy, supple, trachea midline, no JVD       Lungs:     Decreased  to auscultation,respirations regular, even and                  unlabored    Heart:    Regular rhythm and normal rate, normal S1 and S2, no            murmur, no gallop, no rub, no click   Chest Wall:    No abnormalities observed   Abdomen:     Normal bowel sounds, no masses, no organomegaly, soft              Extremities:   Moves all extremities well, no edema, no cyanosis, no             redness   Pulses:   Pulses palpable and equal bilaterally     Results Review:     I reviewed the patient's new clinical results.      WBC WBC   Date/Time Value Ref Range Status   09/03/2017 0440 5.18 3.50 - 10.80 10*3/mm3 Final   09/02/2017 0423 7.41 3.50 - 10.80 10*3/mm3 Final   09/01/2017 1553 8.69 3.50 - 10.80 10*3/mm3 Final            HGB Hemoglobin   Date/Time Value Ref Range Status   09/03/2017 0440 8.5 (L) 13.1 - 17.5 g/dL Final   09/02/2017 0423 8.8 (L) 13.1 - 17.5 g/dL Final   09/01/2017 1553 9.0 (L) 13.1 - 17.5 g/dL Final           HCT Hematocrit   Date/Time Value Ref Range Status   09/03/2017 0440 26.1 (L) 38.9 - 50.9 % Final   09/02/2017 0423 27.9 (L) 38.9 - 50.9 % Final   09/01/2017 1553 28.1 (L)  38.9 - 50.9 % Final            Platlets No results found for: LABPLAT  Sodium  Sodium   Date/Time Value Ref Range Status   09/03/2017 0440 137 132 - 146 mmol/L Final   09/02/2017 0423 137 132 - 146 mmol/L Final   09/01/2017 1553 132 132 - 146 mmol/L Final     Potassium  Potassium   Date/Time Value Ref Range Status   09/03/2017 0440 3.9 3.5 - 5.5 mmol/L Final     Comment:     Verified by repeat analysis.    09/02/2017 0423 5.2 3.5 - 5.5 mmol/L Final   09/01/2017 1553 4.7 3.5 - 5.5 mmol/L Final     Chloride  Chloride   Date/Time Value Ref Range Status   09/03/2017 0440 104 99 - 109 mmol/L Final   09/02/2017 0423 102 99 - 109 mmol/L Final   09/01/2017 1553 101 99 - 109 mmol/L Final     BicarbonateNo results found for: PLASMABICARB    BUN BUN   Date/Time Value Ref Range Status   09/03/2017 0440 14 9 - 23 mg/dL Final   09/02/2017 0423 24 (H) 9 - 23 mg/dL Final   09/01/2017 1553 29 (H) 9 - 23 mg/dL Final      Creatinine Creatinine   Date/Time Value Ref Range Status   09/03/2017 0440 0.80 0.60 - 1.30 mg/dL Final   09/02/2017 0423 0.90 0.60 - 1.30 mg/dL Final   09/01/2017 1553 0.90 0.60 - 1.30 mg/dL Final      Calcium Calcium   Date/Time Value Ref Range Status   09/03/2017 0440 9.0 8.7 - 10.4 mg/dL Final   09/02/2017 0423 9.1 8.7 - 10.4 mg/dL Final   09/01/2017 1553 9.3 8.7 - 10.4 mg/dL Final      Mag Magnesium   Date/Time Value Ref Range Status   09/03/2017 0440 1.9 1.3 - 2.7 mg/dL Final           PT/INR:  No results found for: PROTIME/No results found for: INR  Troponin I   Lab Results   Component Value Date    CKTOTAL 66 04/11/2017    CKMB 0.5 11/28/2015    CKMBINDEX CKMB Index not calculated when CK Total <80 11/28/2015    TROPONINI <0.006 08/13/2017         Vortioxetine HBr 10 mg Oral Daily   amLODIPine 5 mg Oral Daily   aspirin 325 mg Oral Daily   atorvastatin 40 mg Oral Nightly   bumetanide 2 mg Oral Daily   cetirizine 10 mg Oral Daily   colchicine 0.6 mg Oral Daily   cyclobenzaprine 10 mg Oral TID   docusate sodium  100 mg Oral BID   fleet enema 1 enema Rectal Once   gabapentin 300 mg Oral Q12H   insulin detemir 6 Units Subcutaneous QAM   insulin lispro 0-7 Units Subcutaneous 4x Daily With Meals & Nightly   levoFLOXacin 750 mg Intravenous Q24H   metoprolol tartrate 12.5 mg Oral Q12H   pantoprazole 40 mg Oral QAM   pharmacy consult - MTM  Does not apply Daily   polyethylene glycol 17 g Oral Daily   potassium chloride 20 mEq Oral Daily   saccharomyces boulardii 250 mg Oral BID   sennosides-docusate sodium 2 tablet Oral Nightly   sodium chloride 1-10 mL Intravenous Q8H   topiramate 100 mg Oral Nightly       niCARdipine 5-15 mg/hr Last Rate: Stopped (08/25/17 2330)   Pharmacy to Dose LevoFLOXacin (LEVAQUIN)     sodium chloride 30 mL/hr Last Rate: Stopped (08/26/17 1600)       Assessment/Plan     Patient Active Problem List   Diagnosis Code   • Coronary artery disease involving native coronary artery of native heart with unstable angina pectoris I25.110   • Lumbar radiculopathy M54.16   • Hypercholesterolemia E78.00   • Diabetic polyneuropathy associated with type 2 diabetes mellitus E11.42   • Migraine with aura and with status migrainosus G43.101   • Palpitations R00.2   • Seizure disorder G40.909   • GERD (gastroesophageal reflux disease) K21.9   • Brachial neuritis M54.12   • Cervical radiculopathy M54.12   • LOM (loss of memory) R41.3   • Anxiety F41.9   • Diabetes mellitus E11.9   • Lower back pain M54.5   • Essential hypertension I10   • History of CVA (cerebrovascular accident) Z86.73   • Non morbid obesity due to excess calories E66.09   • Post-infarction pericarditis I24.1   • HCAP (healthcare-associated pneumonia) J18.9     Cont support  Increase bp meds    LARRY Maloney  09/03/17  11:05 AM

## 2017-09-03 NOTE — PROGRESS NOTES
POD status post left anterior thoracotomy, pericardial window, status post CABG ×3 on 8/15/2017 with postop pericarditis        LOS: 23 days   Patient Care Team:  Ottoniel Toledo MD as PCP - General (Family Medicine)    Subjective      Objective    Vital Signs  Temp:  [98.7 °F (37.1 °C)-100.7 °F (38.2 °C)] 98.9 °F (37.2 °C)  Heart Rate:  [] 94  Resp:  [16-18] 16  BP: (123-154)/(64-84) 154/84    Physical Exam:   General Appearance: alert, appears stated age and coope   Skin:  Incision c/d/i     Results     Results from last 7 days  Lab Units 09/03/17  0440   WBC 10*3/mm3 5.18   HEMOGLOBIN g/dL 8.5*   HEMATOCRIT % 26.1*   PLATELETS 10*3/mm3 205       Results from last 7 days  Lab Units 09/03/17  0440   SODIUM mmol/L 137   POTASSIUM mmol/L 3.9   CHLORIDE mmol/L 104   CO2 mmol/L 26.0   BUN mg/dL 14   CREATININE mg/dL 0.80   GLUCOSE mg/dL 217*   CALCIUM mg/dL 9.0           Imaging Results (last 24 hours)     Procedure Component Value Units Date/Time    XR Chest 1 View [[id number]] Updated:  09/03/17 0448          Assessment    Active Problems:    Hypercholesterolemia    Diabetic polyneuropathy associated with type 2 diabetes mellitus    Seizure disorder    GERD (gastroesophageal reflux disease)    Essential hypertension    History of CVA (cerebrovascular accident)    Non morbid obesity due to excess calories    Post-infarction pericarditis    HCAP (healthcare-associated pneumonia)        Plan   Awaiting final results on his blood cultures.  He is IV antibiotics at the present time, he can go home tomorrow after the results from the blood cultures are finalized    Harry Hunter PA-C  09/03/17  8:01 AM

## 2017-09-04 LAB
GLUCOSE BLDC GLUCOMTR-MCNC: 250 MG/DL (ref 70–130)
GLUCOSE BLDC GLUCOMTR-MCNC: 274 MG/DL (ref 70–130)
GLUCOSE BLDC GLUCOMTR-MCNC: 278 MG/DL (ref 70–130)
GLUCOSE BLDC GLUCOMTR-MCNC: 343 MG/DL (ref 70–130)

## 2017-09-04 PROCEDURE — 99024 POSTOP FOLLOW-UP VISIT: CPT | Performed by: PHYSICIAN ASSISTANT

## 2017-09-04 PROCEDURE — 99232 SBSQ HOSP IP/OBS MODERATE 35: CPT | Performed by: INTERNAL MEDICINE

## 2017-09-04 PROCEDURE — 82962 GLUCOSE BLOOD TEST: CPT

## 2017-09-04 PROCEDURE — 94799 UNLISTED PULMONARY SVC/PX: CPT

## 2017-09-04 PROCEDURE — 97110 THERAPEUTIC EXERCISES: CPT

## 2017-09-04 PROCEDURE — 63710000001 INSULIN DETEMIR PER 5 UNITS: Performed by: HOSPITALIST

## 2017-09-04 PROCEDURE — 87040 BLOOD CULTURE FOR BACTERIA: CPT | Performed by: INTERNAL MEDICINE

## 2017-09-04 PROCEDURE — 25010000002 LEVOFLOXACIN PER 250 MG

## 2017-09-04 RX ORDER — METOPROLOL TARTRATE 50 MG/1
50 TABLET, FILM COATED ORAL EVERY 12 HOURS SCHEDULED
Status: DISCONTINUED | OUTPATIENT
Start: 2017-09-04 | End: 2017-09-05 | Stop reason: HOSPADM

## 2017-09-04 RX ADMIN — GABAPENTIN 300 MG: 300 CAPSULE ORAL at 08:48

## 2017-09-04 RX ADMIN — Medication 10 ML: at 10:00

## 2017-09-04 RX ADMIN — GABAPENTIN 300 MG: 300 CAPSULE ORAL at 21:00

## 2017-09-04 RX ADMIN — INSULIN LISPRO 4 UNITS: 100 INJECTION, SOLUTION INTRAVENOUS; SUBCUTANEOUS at 18:28

## 2017-09-04 RX ADMIN — AMLODIPINE BESYLATE 5 MG: 5 TABLET ORAL at 08:47

## 2017-09-04 RX ADMIN — CYCLOBENZAPRINE HYDROCHLORIDE 10 MG: 10 TABLET, FILM COATED ORAL at 21:13

## 2017-09-04 RX ADMIN — INSULIN DETEMIR 6 UNITS: 100 INJECTION, SOLUTION SUBCUTANEOUS at 08:15

## 2017-09-04 RX ADMIN — Medication 250 MG: at 18:26

## 2017-09-04 RX ADMIN — BUMETANIDE 2 MG: 1 TABLET ORAL at 08:47

## 2017-09-04 RX ADMIN — OXYCODONE AND ACETAMINOPHEN 2 TABLET: 5; 325 TABLET ORAL at 08:54

## 2017-09-04 RX ADMIN — METOPROLOL TARTRATE 25 MG: 25 TABLET ORAL at 08:48

## 2017-09-04 RX ADMIN — INSULIN LISPRO 5 UNITS: 100 INJECTION, SOLUTION INTRAVENOUS; SUBCUTANEOUS at 21:21

## 2017-09-04 RX ADMIN — PANTOPRAZOLE SODIUM 40 MG: 40 TABLET, DELAYED RELEASE ORAL at 08:07

## 2017-09-04 RX ADMIN — TOPIRAMATE 100 MG: 100 TABLET, FILM COATED ORAL at 21:20

## 2017-09-04 RX ADMIN — LEVOFLOXACIN 750 MG: 750 INJECTION, SOLUTION INTRAVENOUS at 11:51

## 2017-09-04 RX ADMIN — CYCLOBENZAPRINE HYDROCHLORIDE 10 MG: 10 TABLET, FILM COATED ORAL at 18:26

## 2017-09-04 RX ADMIN — Medication 250 MG: at 08:47

## 2017-09-04 RX ADMIN — OXYCODONE AND ACETAMINOPHEN 2 TABLET: 5; 325 TABLET ORAL at 16:52

## 2017-09-04 RX ADMIN — ASPIRIN 325 MG: 325 TABLET, DELAYED RELEASE ORAL at 08:48

## 2017-09-04 RX ADMIN — INSULIN LISPRO 4 UNITS: 100 INJECTION, SOLUTION INTRAVENOUS; SUBCUTANEOUS at 08:55

## 2017-09-04 RX ADMIN — Medication 5 ML: at 18:30

## 2017-09-04 RX ADMIN — COLCHICINE 0.6 MG: 0.6 TABLET, FILM COATED ORAL at 08:55

## 2017-09-04 RX ADMIN — INSULIN LISPRO 3 UNITS: 100 INJECTION, SOLUTION INTRAVENOUS; SUBCUTANEOUS at 13:04

## 2017-09-04 RX ADMIN — METOPROLOL TARTRATE 50 MG: 50 TABLET ORAL at 21:13

## 2017-09-04 RX ADMIN — ATORVASTATIN CALCIUM 40 MG: 40 TABLET, FILM COATED ORAL at 21:13

## 2017-09-04 RX ADMIN — CETIRIZINE HYDROCHLORIDE 10 MG: 10 TABLET, FILM COATED ORAL at 08:48

## 2017-09-04 RX ADMIN — POTASSIUM CHLORIDE 20 MEQ: 750 CAPSULE, EXTENDED RELEASE ORAL at 08:54

## 2017-09-04 RX ADMIN — Medication 10 ML: at 08:06

## 2017-09-04 RX ADMIN — CYCLOBENZAPRINE HYDROCHLORIDE 10 MG: 10 TABLET, FILM COATED ORAL at 08:48

## 2017-09-04 NOTE — PLAN OF CARE
Problem: Patient Care Overview (Adult)  Goal: Plan of Care Review  Outcome: Ongoing (interventions implemented as appropriate)    09/04/17 1834   Coping/Psychosocial Response Interventions   Plan Of Care Reviewed With patient   Patient Care Overview   Progress improving   Outcome Evaluation   Outcome Summary/Follow up Plan pt using pain medication less, walking more, using I.S. and flutter valve       Goal: Adult Individualization and Mutuality  Outcome: Ongoing (interventions implemented as appropriate)    09/04/17 1834   Individualization   Patient Specific Goals pt will walk at least 4 times today   Patient Specific Interventions assist patient to walk         Problem: Cardiac Surgery (Adult)  Goal: Signs and Symptoms of Listed Potential Problems Will be Absent or Manageable (Cardiac Surgery)  Outcome: Ongoing (interventions implemented as appropriate)    09/04/17 1834   Cardiac Surgery   Problems Assessed (Cardiac Surgery) all   Problems Present (Cardiac Surgery) pain         Problem: Skin Integrity Impairment, Risk/Actual (Adult)  Goal: Identify Related Risk Factors and Signs and Symptoms  Outcome: Ongoing (interventions implemented as appropriate)    09/04/17 1834   Skin Integrity Impairment, Risk/Actual   Skin Integrity Impairment, Risk/Actual: Related Risk Factors edema;infection/disease process   Signs and Symptoms (Skin Integrity Impairment) other (see comments)  (blanchable reddness on coccyx)       Goal: Skin Integrity/Wound Healing  Outcome: Ongoing (interventions implemented as appropriate)    09/04/17 1834   Skin Integrity Impairment, Risk/Actual (Adult)   Skin Integrity/Wound Healing making progress toward outcome

## 2017-09-04 NOTE — PROGRESS NOTES
[facility] Medicine Services  INPATIENT PROGRESS NOTE    Date of Admission: 8/11/2017  Length of Stay: 24  Primary Care Physician: Ottoniel Toledo MD    Subjective   CC: Follow-up med management post CABG     SUBJECTIVE:  Overall feels improved but still has productive cough.  No fever or chills.  No nausea, vomiting, diarrhea, abdominal pain.      Review of Systems   Constitutional: Positive for chills. Negative for appetite change, diaphoresis and fever.        Malaise   HENT: Positive for congestion.    Respiratory: Positive for cough and shortness of breath.    Cardiovascular: Positive for leg swelling. Negative for chest pain and palpitations.   Gastrointestinal: Negative for constipation, diarrhea, nausea and vomiting.   Genitourinary: Negative for decreased urine volume and difficulty urinating.   Musculoskeletal: Negative for myalgias.   All other systems reviewed and are negative.         Objective      Temp:  [98.1 °F (36.7 °C)-99.4 °F (37.4 °C)] 99.4 °F (37.4 °C)  Heart Rate:  [84-92] 91  Resp:  [20] 20  BP: (133-146)/(70-82) 146/80     Physical Exam     NAD, sitting up in chair, calm and cooperative.  Neck : supple, no lymphadenopathy palpable.  Cardiovascular: S1 and S2 present, still tachycardia.  No murmur gallop gallops or rubs audible.  Lungs: Decreased bases bilaterally, but improved BS, only slightly decreased now  Sternal wound clean, no erythema  GI: Obese, Soft, +BS, ND, NT  1+ B LE edema  Normal affect    Results Review:    I have reviewed the labs, radiology results and diagnostic studies.      Results from last 7 days  Lab Units 09/03/17  0440   WBC 10*3/mm3 5.18   HEMOGLOBIN g/dL 8.5*   PLATELETS 10*3/mm3 205       Results from last 7 days  Lab Units 09/03/17  0440   SODIUM mmol/L 137   POTASSIUM mmol/L 3.9   CHLORIDE mmol/L 104   CO2 mmol/L 26.0   BUN mg/dL 14   CREATININE mg/dL 0.80   GLUCOSE mg/dL 217*   CALCIUM mg/dL 9.0       Respiratory Culture [[id number]]  (Abnormal) Collected: 08/31/17 0918       Lab Status: Preliminary result Specimen: Sputum from Cough Updated: 09/01/17 0826        Respiratory Culture Light growth (2+) Gram Negative Bacilli (A)         Light growth (2+) Normal Respiratory Ana M        Gram Stain Result Many (4+) WBCs per low power field         Few (2+) Epithelial cells per low power field         Many (4+) Gram positive cocci in pairs and chains       Anaerobic Culture [[id number]] (Normal) Collected: 08/25/17 1617       Lab Status: Final result Specimen: Body Fluid from Pericardium Updated: 09/01/17 1423        Culture No anaerobes isolated       Narrative:         CONTAINER SUBMITTED TO MICROBIOLOGY HAS NO PATIENT INFORMATION ON IT: INTERPRET RESULTS ACCORDINGLY       Fungus Culture [[id number]] Collected: 08/25/17 1617       Lab Status: Preliminary result Specimen: Body Fluid from Pericardium Updated: 09/01/17 1101        Fungus Culture No fungus isolated at less than 1 week       Narrative:         CONTAINER SUBMITTED TO MICROBIOLOGY HAS NO PATIENT INFORMATION ON IT: INTERPRET RESULTS ACCORDINGLY       Body Fluid Culture [[id number]] (Normal) Collected: 08/25/17 1617       Lab Status: Final result Specimen: Body Fluid from Pericardium Updated: 08/29/17 0647        BF Culture No growth at 4 days        Gram Stain Result Few (2+) WBCs seen         No organisms seen       Narrative:         CONTAINER SUBMITTED TO MICROBIOLOGY HAS NO PATIENT INFORMATION ON IT: INTERPRET RESULTS ACCORDINGLY       AFB Culture [[id number]] (Normal) Collected: 08/25/17 1617       Lab Status: Preliminary result Specimen: Body Fluid from Pericardium Updated: 08/30/17 1901        AFB Culture No AFB isolated at less than 1 week        AFB Stain No acid fast bacilli seen on concentrated smear       Narrative:         CONTAINER SUBMITTED TO MICROBIOLOGY HAS NO PATIENT INFORMATION ON IT: INTERPRET RESULTS ACCORDINGLY       Urine Culture [[id number]] (Normal) Collected: 08/15/17  1516       Lab Status: Final result Specimen: Urine from Urine, Catheter Updated: 08/17/17 0736        Urine Culture No growth at 2 days         Ref Range & Units 08/31/17 2137   08/31/17 1717   08/31/17 1616   08/31/17 1115   08/31/17 0740       Glucose 70 - 130 mg/dL 97 93 84 137 (H) 125       Narrative        Radiology Data:   Imaging Results (last 24 hours)     Procedure Component Value Units Date/Time    XR Chest 1 View [[id number]] Collected:  09/03/17 1330     Updated:  09/03/17 1650    Narrative:       EXAMINATION: XR CHEST, SINGLE VIEW - 09/03/2017     INDICATION: Dyspnea on exertion.     COMPARISON: None.     FINDINGS: There is mild atelectatic airspace opacity left base with  borderline heart size. The remainder of the chest is clear. The right  PICC line is in the mid SVC.           Impression:          Compared to studies of 2 days ago, the left base opacity may be slightly  improved but is still significantly persistent.     On the other hand, but, the right lung base is cleared. Mid and upper  lung zones are now clear.     DICTATED:     09/03/2017  EDITED:         09/03/2017     This report was finalized on 9/3/2017 4:48 PM by Dr. Archie Castañeda MD.             I have reviewed the medications.    Assessment/Plan     Problem List  Hospital Problem List     Hypercholesterolemia    Overview Signed 11/2/2016  9:34 AM by Shannon Torres      Assessed By: Yasmin Millard (Cardiology); Last Assessed: 13 Aug 2015         Diabetic polyneuropathy associated with type 2 diabetes mellitus    Seizure disorder    GERD (gastroesophageal reflux disease)    Essential hypertension    History of CVA (cerebrovascular accident)    Non morbid obesity due to excess calories    Post-infarction pericarditis    HCAP (healthcare-associated pneumonia)           Mr. Harding is a 56yo male with multivessel CAD with LHC by Dr. Kim on 8/11/17 and CABG x3 by Dr. Damon on 8/15/17. Developed postop pericarditis w/effusion s/p left  "thoracotomy and pericardial window 8/25/17. Hospitalist Consulted upon transfer to acute care by CTS,  for medical management.       PER CT SURGERY/CARDIOLOGY  --Multivessel CAD s/p CABG x 3 on 8/15/17  Dr. Damon  --Post op pericarditis w/effusion s/p left anterior thoracotomy and pericardial window 8/25/17  --Hypertension, well controlled  --Diuresis per cards      Insulin-dependent DMII with peripheral neuropathy  - Hgb A1c this admission 8.9%  - He reports blood sugars at home are \"not good\" usually 200-300 range   - Monitor glucose, titrate insulin as need, but poor appetite      HCAP PNA, gram negative pneumonia  --change to PO levaquin    +Blood culture  --suspected contaminant, but await final culture      Seizure disorder, stable continue Topamax       GERD, continue PPI    History of CVA, without residual. Continue ASA and statin     Morbid obesity (BMI 39), continue to encourage dietary compliance and lifestyle modification     plan:  - cont current care.  - repeat BC  - home soon     Davi Sanchez MD   09/04/17   4:02 PM      "

## 2017-09-04 NOTE — PLAN OF CARE
Problem: Patient Care Overview (Adult)  Goal: Plan of Care Review  Outcome: Ongoing (interventions implemented as appropriate)    09/04/17 1445   Coping/Psychosocial Response Interventions   Plan Of Care Reviewed With patient   Outcome Evaluation   Outcome Summary/Follow up Plan Pt able to transfer sit to stand indep but appeared be pushing up w/RUE although he denied using arm to push up. He was able to inccrease walking distance today.         Problem: Inpatient Physical Therapy  Goal: Bed Mobility Goal LTG- PT  Outcome: Ongoing (interventions implemented as appropriate)    08/16/17 1148 08/28/17 0952 09/04/17 1445   Bed Mobility PT LTG   Bed Mobility PT LTG, Date Established 08/16/17 --  --    Bed Mobility PT LTG, Time to Achieve 2 wks --  --    Bed Mobility PT LTG, Activity Type supine to sit/sit to supine --  --    Bed Mobility PT LTG, [place] Level independent --  --    Bed Mobility PT LTG, Date Goal Reviewed --  08/28/17 --    Bed Mobility PT LTG, Outcome --  --  goal ongoing       Goal: Transfer Training Goal 1 LTG- PT  Outcome: Ongoing (interventions implemented as appropriate)    08/16/17 1148 08/28/17 0952 09/04/17 1445   Transfer Training PT LTG   Transfer Training PT LTG, Date Established 08/16/17 --  --    Transfer Training PT LTG, Time to Achieve 2 wks --  --    Transfer Training PT LTG, Activity Type sit to stand/stand to sit --  --    Transfer Training PT LTG, [place] Level independent --  --    Transfer Training PT LTG, Date Goal Reviewed --  08/28/17 --    Transfer Training PT LTG, Outcome --  --  goal ongoing       Goal: Gait Training Goal LTG- PT  Outcome: Ongoing (interventions implemented as appropriate)    08/24/17 1040 08/28/17 0952 09/04/17 1445   Gait Training PT LTG   Gait Training Goal PT LTG, Date Established 08/16/17 --  --    Gait Training Goal PT LTG, Time to Achieve 2 wks --  --    Gait Training Goal PT LTG, [place] Level independent  (stable VS;O2 > 90%) --  --     Gait Training Goal PT LTG, Assist Device cane, straight --  --    Gait Training Goal PT LTG, Distance to Achieve 700  (MET dist. goal of 500 ft;new goal 700' w/SPC) --  --    Gait Training Goal PT LTG, Date Goal Reviewed --  08/28/17 --    Gait Training Goal PT LTG, Outcome --  --  goal ongoing       Goal: Stair Training Goal LTG- PT  Outcome: Ongoing (interventions implemented as appropriate)    08/16/17 1148 08/28/17 0952 09/04/17 1445   Stair Training PT LTG   Stair Training Goal PT LTG, Date Established 08/16/17 --  --    Stair Training Goal PT LTG, Time to Achieve 2 wks --  --    Stair Training Goal PT LTG, Number of Steps 3 --  --    Stair Training Goal PT LTG, [place] Level supervision required --  --    Stair Training Goal PT LTG, Date Goal Reviewed --  08/28/17 --    Stair Training Goal PT LTG, Outcome --  --  goal ongoing

## 2017-09-04 NOTE — PLAN OF CARE
Problem: Patient Care Overview (Adult)  Goal: Plan of Care Review  Outcome: Ongoing (interventions implemented as appropriate)    09/04/17 0419   Coping/Psychosocial Response Interventions   Plan Of Care Reviewed With patient   Patient Care Overview   Progress progress towards functional goals is fair   Outcome Evaluation   Outcome Summary/Follow up Plan pt resting comfortabley during the night         Problem: Cardiac Surgery (Adult)  Goal: Signs and Symptoms of Listed Potential Problems Will be Absent or Manageable (Cardiac Surgery)  Outcome: Ongoing (interventions implemented as appropriate)    09/04/17 0419   Cardiac Surgery   Problems Assessed (Cardiac Surgery) all

## 2017-09-04 NOTE — PROGRESS NOTES
CTS Progress Note      POD 10 s/p Pericardial window  POD 20 s/p CABG     LOS: 24 days   Chief Complaint: Cough      Subjective  BP meds increased by cardiology.  No acute events overnight.  Patient reports cough with production of sputum.  Currently on IV antibiotics. No other complaints.      Objective    Vital Signs  Temp:  [98 °F (36.7 °C)-98.7 °F (37.1 °C)] 98.1 °F (36.7 °C)  Heart Rate:  [90-92] 92  Resp:  [18-20] 20  BP: (129-141)/(70-82) 141/82    Physical Exam:   General Appearance: alert, appears stated age and cooperative   Lungs: Decreased lung sounds bilateral bases   Heart: regular rhythm & normal rate, normal S1, S2 and no murmur, no reid, no rub   Chest: sternum stable   Skin: Incision c/d/i     Results     Results from last 7 days  Lab Units 09/03/17  0440   WBC 10*3/mm3 5.18   HEMOGLOBIN g/dL 8.5*   HEMATOCRIT % 26.1*   PLATELETS 10*3/mm3 205       Results from last 7 days  Lab Units 09/03/17  0440   SODIUM mmol/L 137   POTASSIUM mmol/L 3.9   CHLORIDE mmol/L 104   CO2 mmol/L 26.0   BUN mg/dL 14   CREATININE mg/dL 0.80   GLUCOSE mg/dL 217*   CALCIUM mg/dL 9.0       Imaging Results (last 24 hours)     Procedure Component Value Units Date/Time    XR Chest 1 View [[id number]] Collected:  09/03/17 1330     Updated:  09/03/17 1650    Narrative:       EXAMINATION: XR CHEST, SINGLE VIEW - 09/03/2017     INDICATION: Dyspnea on exertion.     COMPARISON: None.     FINDINGS: There is mild atelectatic airspace opacity left base with  borderline heart size. The remainder of the chest is clear. The right  PICC line is in the mid SVC.           Impression:          Compared to studies of 2 days ago, the left base opacity may be slightly  improved but is still significantly persistent.     On the other hand, but, the right lung base is cleared. Mid and upper  lung zones are now clear.     DICTATED:     09/03/2017  EDITED:         09/03/2017     This report was finalized on 9/3/2017 4:48 PM by Dr. Archie Castañeda,  MD.             Assessment    Active Problems:    Hypercholesterolemia    Diabetic polyneuropathy associated with type 2 diabetes mellitus    Seizure disorder    GERD (gastroesophageal reflux disease)    Essential hypertension    History of CVA (cerebrovascular accident)    Non morbid obesity due to excess calories    Post-infarction pericarditis    HCAP (healthcare-associated pneumonia)    Preliminary blood cultures show no growth at 2 days    Plan   Awaiting final blood culture results  Ambulate  Pulmonary toilet  Watch H&H    Viviana Luciano PA-C  09/04/17  7:22 AM

## 2017-09-04 NOTE — THERAPY TREATMENT NOTE
Acute Care - Physical Therapy Treatment Note  [facility]     Patient Name: Denzel Harding  : 1960  MRN: [id number]  Today's Date: 2017  Onset of Illness/Injury or Date of Surgery Date: 17  Date of Referral to PT: 17  Referring Physician: LARRY Byrnes    Admit Date: 2017    Visit Dx:    ICD-10-CM ICD-9-CM   1. Coronary artery disease involving native coronary artery of native heart with unstable angina pectoris I25.110 414.01     411.1   2. Impaired functional mobility, balance, gait, and endurance Z74.09 V49.89   3. HUTCHINS (dyspnea on exertion) R06.09 786.09   4. Diabetic polyneuropathy associated with type 2 diabetes mellitus E11.42 250.60     357.2   5. Type 2 diabetes mellitus with complication, unspecified long term insulin use status E11.8 250.90   6. Pericardial effusion I31.3 423.9     Patient Active Problem List   Diagnosis   • Coronary artery disease involving native coronary artery of native heart with unstable angina pectoris   • Lumbar radiculopathy   • Hypercholesterolemia   • Diabetic polyneuropathy associated with type 2 diabetes mellitus   • Migraine with aura and with status migrainosus   • Palpitations   • Seizure disorder   • GERD (gastroesophageal reflux disease)   • Brachial neuritis   • Cervical radiculopathy   • LOM (loss of memory)   • Anxiety   • Diabetes mellitus   • Lower back pain   • Essential hypertension   • History of CVA (cerebrovascular accident)   • Non morbid obesity due to excess calories   • Post-infarction pericarditis   • HCAP (healthcare-associated pneumonia)               Adult Rehabilitation Note       17 1415 17 1648       Rehab Assessment/Intervention    Discipline physical therapist  -LS physical therapist  -LSA     Document Type therapy note (daily note)  -LS therapy note (daily note)  -LSA     Subjective Information agree to therapy  -LS agree to therapy;complains of;pain  -LSA     Patient Effort, Rehab Treatment  good  -LSA      Symptoms Noted During/After Treatment  shortness of breath  -LSA     Precautions/Limitations  cardiac precautions;fall precautions;sternal precautions  -LSA     Recorded by [LS] Rosalba Fried, PT [LSA] Kathrin Mera, PT     Vital Signs    Pre Systolic BP Rehab  119  -LSA     Pre Treatment Diastolic BP  70  -LSA     Pretreatment Heart Rate (beats/min)  91  -LSA     Posttreatment Heart Rate (beats/min)  104  -LSA     Pre SpO2 (%)  94  -LSA     O2 Delivery Pre Treatment  room air  -LSA     Post SpO2 (%)  92  -LSA     O2 Delivery Post Treatment  room air  -LSA     Pre Patient Position  Sitting  -LSA     Intra Patient Position  Standing  -LSA     Post Patient Position  Sitting  -LSA     Recorded by  [LSA] Kathrin Mera, PT     Pain Assessment    Pain Assessment 0-10  -LS 0-10  -LSA     Pain Score 8  -LS 4  -LSA     Post Pain Score 8  -LS 7  -LSA     Pain Location  Chest  -LSA     Pain Orientation  Right  -LSA     Pain Intervention(s) Ambulation/increased activity;Repositioned  -LS Repositioned;Ambulation/increased activity  -LSA     Response to Interventions  tolerated  -LSA     Recorded by [LS] Rosalba Fried, PT [LSA] Kathrin Mera, PT     Cognitive Assessment/Intervention    Current Cognitive/Communication Assessment  functional  -LSA     Orientation Status  oriented x 4  -LSA     Follows Commands/Answers Questions  able to follow single-step instructions;75% of the time;100% of the time;needs cueing;needs increased time  -LSA     Personal Safety  mild impairment;decreased awareness, need for safety  -LSA     Personal Safety Interventions  fall prevention program maintained;gait belt;nonskid shoes/slippers when out of bed  -LSA     Recorded by  [LSA] Kathrin Mera, PT     Bed Mobility, Assessment/Treatment    Bed Mobility, Comment UIC. Pt said it hurts to get in and OOB but declined practicing w/correct technique today. P.T. verbally reviewed technique for log rolling in and out of bed. Pt verbalized  "understanding.  -LS UIC upon arrival  -LSA     Recorded by [LS] Rosalba Fried, PT [LSA] Kathrin Mera, PT     Transfer Assessment/Treatment    Transfers, Sit-Stand Rooks independent  -LS supervision required;verbal cues required  -LSA     Transfers, Stand-Sit [place] independent  -LS supervision required;verbal cues required  -LSA     Transfers, Sit-Stand-Sit, Assist Device rolling walker  -LS rolling walker  -LSA     Transfer, Impairments  pain;strength decreased  -LSA     Transfer, Comment VCs to not push up with  arms. Pt appeared to be pushing up w/R hand on armrest of chair but said he was not and that his hand was \"just laying there.\"  -LS VC's for maintaining sternal precautions.   -LSA     Recorded by [LS] Rosalba Fried, PT [LSA] Kathrin Mera, PT     Gait Assessment/Treatment    Gait, Rooks Level supervision required  -LS supervision required;verbal cues required  -LSA     Gait, Assistive Device rolling walker  -LS rolling walker  -LSA     Gait, Distance (Feet) 400  -  -LSA     Gait, Gait Deviations forward flexed posture;han decreased;bilateral:;step length decreased  -LS han decreased;step length decreased;forward flexed posture  -LSA     Gait, Impairments  strength decreased;pain  -LSA     Gait, Comment  Pt declined further distance due to c/o SOA. VC's for PLB and increasing step length.   -LSA     Recorded by [LS] Rosalba Fried, PT [LSA] Kathrin Mera, PT     Balance Skills Training    Sitting-Level of Assistance  Close supervision  -LSA     Sitting-Balance Support  Feet supported  -LSA     Standing-Level of Assistance  Close supervision  -LSA     Static Standing Balance Support  assistive device  -LSA     Gait Balance-Level of Assistance  Close supervision  -LSA     Gait Balance Support  assistive device  -LSA     Recorded by  [LSA] Kathrin Mera, PT     Therapy Exercises    Bilateral Lower Extremities AROM:;10 reps;sitting;ankle pumps/circles;hip " flexion;LAQ  -LS AROM:;10 reps;sitting;ankle pumps/circles;hip flexion;LAQ  -LSA     Bilateral Upper Extremity AROM:;10 reps;sitting;elbow flexion/extension;shoulder abduction/adduction;shoulder extension/flexion  -LS      Recorded by [LS] Rosalba Fried, PT [LSA] Kathrin Mera, PT     Positioning and Restraints    Pre-Treatment Position sitting in chair/recliner  -LS sitting in chair/recliner  -LSA     Post Treatment Position chair  -LS chair  -LSA     In Chair notified nsg;sitting;call light within reach;with family/caregiver;legs elevated  -LS notified nsg;reclined;call light within reach;encouraged to call for assist;with family/caregiver;legs elevated  -LSA     Recorded by [LS] Rosalba Fried, PT [LSA] Kathrin Mera, PT       User Key  (r) = Recorded By, (t) = Taken By, (c) = Cosigned By    Initials Name Effective Dates    LS Rosalba Fried, PT 06/19/15 -     LSA Kathrin Mera, PT 06/19/15 -                 IP PT Goals       09/04/17 1445 09/01/17 1716 08/30/17 0941    Bed Mobility PT LTG    Bed Mobility PT LTG, Outcome goal ongoing  -LS goal ongoing  -LSA goal ongoing  -LSA    Transfer Training PT LTG    Transfer Training PT LTG, Outcome goal ongoing  -LS goal ongoing  -LSA goal ongoing  -LSA    Gait Training PT LTG    Gait Training Goal PT LTG, Outcome goal ongoing  -LS goal ongoing  -LSA goal ongoing  -LSA    Stair Training PT LTG    Stair Training Goal PT LTG, Outcome goal ongoing  -LS goal ongoing  -LSA goal ongoing  -LSA      08/29/17 1200 08/28/17 0952 08/24/17 1040    Bed Mobility PT LTG    Bed Mobility PT LTG, Date Goal Reviewed  08/28/17  -LSA     Bed Mobility PT LTG, Outcome goal ongoing  -DM goal ongoing  -LSA goal ongoing  -DM    Transfer Training PT LTG    Transfer Training PT  LTG, Date Goal Reviewed  08/28/17  -LSA     Transfer Training PT LTG, Outcome goal ongoing  -DM goal ongoing  -LSA goal ongoing  -DM    Gait Training PT LTG    Gait Training Goal PT LTG, Date Established    08/16/17  -DM    Gait Training Goal PT LTG, Time to Achieve   2 wks  -DM    Gait Training Goal PT LTG, Burnett Level   independent   stable VS;O2 > 90%  -DM    Gait Training Goal PT LTG, Assist Device   cane, straight  -DM    Gait Training Goal PT LTG, Distance to Achieve   700   MET dist. goal of 500 ft;new goal 700' w/SPC  -DM    Gait Training Goal PT LTG, Date Goal Reviewed  08/28/17  -LSA 08/24/17  -DM    Gait Training Goal PT LTG, Outcome goal ongoing  -DM goal ongoing  -LSA goal revised  -DM    Stair Training PT LTG    Stair Training Goal PT LTG, Date Goal Reviewed  08/28/17  -LSA     Stair Training Goal PT LTG, Outcome goal ongoing  -DM goal ongoing  -LSA goal ongoing  -DM      08/23/17 1115          Bed Mobility PT LTG    Bed Mobility PT LTG, Outcome goal ongoing  -DM      Transfer Training PT LTG    Transfer Training PT LTG, Outcome goal ongoing  -DM      Gait Training PT LTG    Gait Training Goal PT LTG, Date Established 08/16/17  -DM      Gait Training Goal PT LTG, Time to Achieve 2 wks  -DM      Gait Training Goal PT LTG, Burnett Level independent  -DM      Gait Training Goal PT LTG, Distance to Achieve 500   MET GOAL  FT;NEW GOAL 8-23  -DM      Gait Training Goal PT LTG, Date Goal Reviewed 08/23/17  -DM      Gait Training Goal PT LTG, Outcome goal revised  -DM      Stair Training PT LTG    Stair Training Goal PT LTG, Outcome goal ongoing  -DM        User Key  (r) = Recorded By, (t) = Taken By, (c) = Cosigned By    Initials Name Provider Type    DM Cris Amado, PT Physical Therapist     Rosalba Fried, PT Physical Therapist    DIEGO Mera, PT Physical Therapist          Physical Therapy Education     Title: PT OT SLP Therapies (Active)     Topic: Physical Therapy (Active)     Point: Mobility training (Active)    Learning Progress Summary    Learner Readiness Method Response Comment Documented by Status   Patient Acceptance E,D OPAL WEBB 09/01/17 1399 Active    Acceptance E  NR   08/31/17 1109 Active    Acceptance E,D NR  LS 08/30/17 0941 Active    Eager D,E NR  DM 08/29/17 1200 Active    Acceptance E,D NR  LS 08/28/17 0952 Active    Eager D,E NR  DM 08/24/17 1040 Active    Eager E,D NR  DM 08/23/17 1115 Active    Acceptance E NR  AS 08/21/17 1122 Active    Acceptance E,D DU,NR  UD 08/19/17 1133 Done    Eager E,D NR  DM 08/18/17 1149 Active    Acceptance E NR  KR 08/17/17 1103 Active    Acceptance E NR  KR 08/16/17 1147 Active   Family Acceptance E NR  AS 08/21/17 1122 Active    Acceptance E,D DU,NR  UD 08/19/17 1133 Done   Significant Other Eager E,D NR  DM 08/23/17 1115 Active    Eager E,D NR  DM 08/18/17 1149 Active               Point: Home exercise program (Active)    Learning Progress Summary    Learner Readiness Method Response Comment Documented by Status   Patient Acceptance E,H VU,NR HEP of progressive amb beginning with 4-5 mins and progressing as able to 20-30 mins as well as sitting exs to be done X 10 reps 2-3X/day (hip flex, LAQ, aps, sh flexion, sh abd w/elbows flexed, elbow flex/ext). LS1 09/04/17 1442 Done    Acceptance E,D NR   09/01/17 1716 Active    Acceptance E NR   08/31/17 1109 Active    Acceptance E,D NR   08/30/17 0941 Active    Eager D,E NR  DM 08/29/17 1200 Active    Acceptance E,D NR   08/28/17 0952 Active    Eager D,E NR  DM 08/24/17 1040 Active    Eager E,D NR  DM 08/23/17 1115 Active    Acceptance E NR  AS 08/21/17 1122 Active    Acceptance E,D DU,NR  UD 08/19/17 1133 Done    Eager E,D NR  DM 08/18/17 1149 Active   Family Acceptance E NR  AS 08/21/17 1122 Active    Acceptance E,D DU,NR  UD 08/19/17 1133 Done   Significant Other Eager E,D NR  DM 08/23/17 1115 Active    Eager E,D NR  DM 08/18/17 1149 Active               Point: Body mechanics (Active)    Learning Progress Summary    Learner Readiness Method Response Comment Documented by Status   Patient Acceptance KENN MOHR NR  LS 09/01/17 8866 Active    Acceptance E NR  SJ 08/31/17 1109 Active     "Acceptance E,D NR  LS 08/30/17 0941 Active    Eager D,E NR  DM 08/29/17 1200 Active    Acceptance E,D NR  LS 08/28/17 0952 Active    Eager D,E NR  DM 08/24/17 1040 Active    Eager E,D NR  DM 08/23/17 1115 Active    Acceptance E NR  AS 08/21/17 1122 Active    Acceptance E,D DU,NR  UD 08/19/17 1133 Done    Eager E,D NR  DM 08/18/17 1149 Active    Acceptance E NR  KR 08/17/17 1103 Active    Acceptance E NR  KR 08/16/17 1147 Active   Family Acceptance E NR  AS 08/21/17 1122 Active    Acceptance E,D DU,NR  UD 08/19/17 1133 Done   Significant Other Eager E,D NR  DM 08/23/17 1115 Active    Eager E,D NR  DM 08/18/17 1149 Active               Point: Precautions (Active)    Learning Progress Summary    Learner Readiness Method Response Comment Documented by Status   Patient Acceptance E VU Reviewed cardiac precautions; pt verbalized understanding. Also educated pt to avoid pushing to stand using arms. Pt appeared to be pushing up w/RUE but said he was not and that his hand was \"just laying there.\" LS1 09/04/17 1443 Done    Acceptance E,D NR  LS 09/01/17 1716 Active    Acceptance E NR  SJ 08/31/17 1109 Active    Acceptance E,D NR  LS 08/30/17 0941 Active    Eager D,E NR  DM 08/29/17 1200 Active    Acceptance E,D NR  LS 08/28/17 0952 Active    Eager D,E NR  DM 08/24/17 1040 Active    Eager E,D NR  DM 08/23/17 1115 Active    Acceptance E NR  AS 08/21/17 1122 Active    Acceptance E,D DU,NR  UD 08/19/17 1133 Done    Eager E,D NR  DM 08/18/17 1149 Active    Acceptance E NR  KR 08/17/17 1103 Active    Acceptance E NR  KR 08/16/17 1147 Active   Family Acceptance E NR  AS 08/21/17 1122 Active    Acceptance E,D DU,NR  UD 08/19/17 1133 Done   Significant Other Eager E,D NR  DM 08/23/17 1115 Active    Eager E,D NR  DM 08/18/17 1149 Active                      User Key     Initials Effective Dates Name Provider Type Discipline    UD 06/22/15 -  Jailene Soliz PTA Physical Therapy Assistant PT    SJ 06/19/15 -  Leticia Ag PT Physical " Therapist PT    DM 06/19/15 -  Cris Amado, PT Physical Therapist PT    AS 06/22/15 -  Esperanza Dawkins, PTA Physical Therapy Assistant PT    LS1 06/19/15 -  Rosalba Fried, PT Physical Therapist PT    LS 06/19/15 -  Kathrin Mera, PT Physical Therapist PT    KR 05/30/17 -  Deann Rodriguez, PT Student PT Student PT                    PT Recommendation and Plan  Anticipated Discharge Disposition: home with assist  PT Frequency: daily  Plan of Care Review  Plan Of Care Reviewed With: patient  Outcome Summary/Follow up Plan: Pt able to transfer sit to stand indep but appeared be pushing up w/RUE although he denied using arm to push up. He was able to inccrease walking distance today.          Outcome Measures       09/04/17 1415 09/01/17 1648       How much help from another person do you currently need...    Turning from your back to your side while in flat bed without using bedrails? 3  -LS 3  -LSA     Moving from lying on back to sitting on the side of a flat bed without bedrails? 2  -LS 2  -LSA     Moving to and from a bed to a chair (including a wheelchair)? 3  -LS 3  -LSA     Standing up from a chair using your arms (e.g., wheelchair, bedside chair)? 3  -LS 3  -LSA     Climbing 3-5 steps with a railing? 3  -LS 3  -LSA     To walk in hospital room? 3  -LS 3  -LSA     AM-PAC 6 Clicks Score 17  -LS 17  -LSA     Functional Assessment    Outcome Measure Options AM-PAC 6 Clicks Basic Mobility (PT)  -LS AM-PAC 6 Clicks Basic Mobility (PT)  -LSA       User Key  (r) = Recorded By, (t) = Taken By, (c) = Cosigned By    Initials Name Provider Type    LS Rosalba Fried, PT Physical Therapist    LSA Kathrin Mera, PT Physical Therapist           Time Calculation:         PT Charges       09/04/17 1448          Time Calculation    Start Time 1415   tcc: 15min  -LS      PT Received On 09/04/17  -      PT Goal Re-Cert Due Date 09/07/17  -        User Key  (r) = Recorded By, (t) = Taken By, (c) = Cosigned By     Initials Name Provider Type    LS Rosalba Fried, PT Physical Therapist          Therapy Charges for Today     Code Description Service Date Service Provider Modifiers Qty    74674601606 HC PT THER PROC EA 15 MIN 9/4/2017 Rosalba Fried, PT GP 1          PT G-Codes  Outcome Measure Options: AM-PAC 6 Clicks Basic Mobility (PT)    Rosalba Fried, PT  9/4/2017

## 2017-09-04 NOTE — PROGRESS NOTES
[facility]       LOS: 24 days   Patient Care Team:  Ottoniel Toledo MD as PCP - General (Family Medicine)        Subjective       Patient Denies:  Sob, palps.  Some chest pain      Vital Signs  Temp:  [98.1 °F (36.7 °C)-99.4 °F (37.4 °C)] 99.4 °F (37.4 °C)  Heart Rate:  [84-92] 91  Resp:  [18-20] 20  BP: (129-146)/(70-82) 146/80    Intake/Output Summary (Last 24 hours) at 09/04/17 1256  Last data filed at 09/04/17 1151   Gross per 24 hour   Intake              870 ml   Output              800 ml   Net               70 ml     I/O this shift:  In: 390 [P.O.:240; IV Piggyback:150]  Out: -     Physical Exam:     General Appearance:    Alert, cooperative, in no acute distress       Neck:   No adenopathy, supple, trachea midline, no JVD       Lungs:     Decreased  to auscultation,respirations regular, even and                  unlabored    Heart:    Regular rhythm and normal rate, normal S1 and S2, no            murmur, no gallop, no rub, no click   Chest Wall:    No abnormalities observed   Abdomen:     Normal bowel sounds, no masses, no organomegaly, soft              Extremities:   Moves all extremities well, no edema, no cyanosis, no             redness   Pulses:   Pulses palpable and equal bilaterally     Results Review:     I reviewed the patient's new clinical results.      WBC WBC   Date/Time Value Ref Range Status   09/03/2017 0440 5.18 3.50 - 10.80 10*3/mm3 Final   09/02/2017 0423 7.41 3.50 - 10.80 10*3/mm3 Final   09/01/2017 1553 8.69 3.50 - 10.80 10*3/mm3 Final            HGB Hemoglobin   Date/Time Value Ref Range Status   09/03/2017 0440 8.5 (L) 13.1 - 17.5 g/dL Final   09/02/2017 0423 8.8 (L) 13.1 - 17.5 g/dL Final   09/01/2017 1553 9.0 (L) 13.1 - 17.5 g/dL Final           HCT Hematocrit   Date/Time Value Ref Range Status   09/03/2017 0440 26.1 (L) 38.9 - 50.9 % Final   09/02/2017 0423 27.9 (L) 38.9 - 50.9 % Final   09/01/2017 1553 28.1 (L) 38.9 -  50.9 % Final            Platlets No results found for: LABPLAT  Sodium  Sodium   Date/Time Value Ref Range Status   09/03/2017 0440 137 132 - 146 mmol/L Final   09/02/2017 0423 137 132 - 146 mmol/L Final   09/01/2017 1553 132 132 - 146 mmol/L Final     Potassium  Potassium   Date/Time Value Ref Range Status   09/03/2017 0440 3.9 3.5 - 5.5 mmol/L Final     Comment:     Verified by repeat analysis.    09/02/2017 0423 5.2 3.5 - 5.5 mmol/L Final   09/01/2017 1553 4.7 3.5 - 5.5 mmol/L Final     Chloride  Chloride   Date/Time Value Ref Range Status   09/03/2017 0440 104 99 - 109 mmol/L Final   09/02/2017 0423 102 99 - 109 mmol/L Final   09/01/2017 1553 101 99 - 109 mmol/L Final     BicarbonateNo results found for: PLASMABICARB    BUN BUN   Date/Time Value Ref Range Status   09/03/2017 0440 14 9 - 23 mg/dL Final   09/02/2017 0423 24 (H) 9 - 23 mg/dL Final   09/01/2017 1553 29 (H) 9 - 23 mg/dL Final      Creatinine Creatinine   Date/Time Value Ref Range Status   09/03/2017 0440 0.80 0.60 - 1.30 mg/dL Final   09/02/2017 0423 0.90 0.60 - 1.30 mg/dL Final   09/01/2017 1553 0.90 0.60 - 1.30 mg/dL Final      Calcium Calcium   Date/Time Value Ref Range Status   09/03/2017 0440 9.0 8.7 - 10.4 mg/dL Final   09/02/2017 0423 9.1 8.7 - 10.4 mg/dL Final   09/01/2017 1553 9.3 8.7 - 10.4 mg/dL Final      Mag Magnesium   Date/Time Value Ref Range Status   09/03/2017 0440 1.9 1.3 - 2.7 mg/dL Final           PT/INR:  No results found for: PROTIME/No results found for: INR  Troponin I     Lab Results   Component Value Date    CKTOTAL 66 04/11/2017    CKMB 0.5 11/28/2015    CKMBINDEX CKMB Index not calculated when CK Total <80 11/28/2015    TROPONINI <0.006 08/13/2017         Vortioxetine HBr 10 mg Oral Daily   amLODIPine 5 mg Oral Daily   aspirin 325 mg Oral Daily   atorvastatin 40 mg Oral Nightly   bumetanide 2 mg Oral Daily   cetirizine 10 mg Oral Daily   colchicine 0.6 mg Oral Daily   cyclobenzaprine 10 mg Oral TID   docusate sodium 100  mg Oral BID   fleet enema 1 enema Rectal Once   gabapentin 300 mg Oral Q12H   insulin detemir 6 Units Subcutaneous QAM   insulin lispro 0-7 Units Subcutaneous 4x Daily With Meals & Nightly   levoFLOXacin 750 mg Intravenous Q24H   metoprolol tartrate 25 mg Oral Q12H   pantoprazole 40 mg Oral QAM   pharmacy consult - MTM  Does not apply Daily   polyethylene glycol 17 g Oral Daily   potassium chloride 20 mEq Oral Daily   saccharomyces boulardii 250 mg Oral BID   sennosides-docusate sodium 2 tablet Oral Nightly   sodium chloride 1-10 mL Intravenous Q8H   topiramate 100 mg Oral Nightly       niCARdipine 5-15 mg/hr Last Rate: Stopped (08/25/17 2330)   Pharmacy to Dose LevoFLOXacin (LEVAQUIN)     sodium chloride 30 mL/hr Last Rate: Stopped (08/26/17 1600)       Assessment/Plan     Patient Active Problem List   Diagnosis Code   • Coronary artery disease involving native coronary artery of native heart with unstable angina pectoris I25.110   • Lumbar radiculopathy M54.16   • Hypercholesterolemia E78.00   • Diabetic polyneuropathy associated with type 2 diabetes mellitus E11.42   • Migraine with aura and with status migrainosus G43.101   • Palpitations R00.2   • Seizure disorder G40.909   • GERD (gastroesophageal reflux disease) K21.9   • Brachial neuritis M54.12   • Cervical radiculopathy M54.12   • LOM (loss of memory) R41.3   • Anxiety F41.9   • Diabetes mellitus E11.9   • Lower back pain M54.5   • Essential hypertension I10   • History of CVA (cerebrovascular accident) Z86.73   • Non morbid obesity due to excess calories E66.09   • Post-infarction pericarditis I24.1   • HCAP (healthcare-associated pneumonia) J18.9     Cont support  Increase bp meds    LARRY Maloney  09/04/17  12:56 PM

## 2017-09-05 VITALS
WEIGHT: 291.6 LBS | BODY MASS INDEX: 41.75 KG/M2 | TEMPERATURE: 97.7 F | HEIGHT: 70 IN | HEART RATE: 87 BPM | RESPIRATION RATE: 20 BRPM | DIASTOLIC BLOOD PRESSURE: 79 MMHG | SYSTOLIC BLOOD PRESSURE: 139 MMHG | OXYGEN SATURATION: 95 %

## 2017-09-05 LAB
ANION GAP SERPL CALCULATED.3IONS-SCNC: 5 MMOL/L (ref 3–11)
BUN BLD-MCNC: 11 MG/DL (ref 9–23)
BUN/CREAT SERPL: 15.7 (ref 7–25)
CALCIUM SPEC-SCNC: 9.4 MG/DL (ref 8.7–10.4)
CHLORIDE SERPL-SCNC: 99 MMOL/L (ref 99–109)
CO2 SERPL-SCNC: 27 MMOL/L (ref 20–31)
CREAT BLD-MCNC: 0.7 MG/DL (ref 0.6–1.3)
GFR SERPL CREATININE-BSD FRML MDRD: 117 ML/MIN/1.73
GLUCOSE BLD-MCNC: 276 MG/DL (ref 70–100)
GLUCOSE BLDC GLUCOMTR-MCNC: 253 MG/DL (ref 70–130)
GLUCOSE BLDC GLUCOMTR-MCNC: 266 MG/DL (ref 70–130)
POTASSIUM BLD-SCNC: 4.1 MMOL/L (ref 3.5–5.5)
SODIUM BLD-SCNC: 131 MMOL/L (ref 132–146)

## 2017-09-05 PROCEDURE — 63710000001 INSULIN DETEMIR PER 5 UNITS: Performed by: HOSPITALIST

## 2017-09-05 PROCEDURE — 94799 UNLISTED PULMONARY SVC/PX: CPT

## 2017-09-05 PROCEDURE — 80048 BASIC METABOLIC PNL TOTAL CA: CPT

## 2017-09-05 PROCEDURE — 25010000002 LEVOFLOXACIN PER 250 MG

## 2017-09-05 PROCEDURE — 99232 SBSQ HOSP IP/OBS MODERATE 35: CPT | Performed by: INTERNAL MEDICINE

## 2017-09-05 PROCEDURE — 99232 SBSQ HOSP IP/OBS MODERATE 35: CPT | Performed by: NURSE PRACTITIONER

## 2017-09-05 PROCEDURE — 82962 GLUCOSE BLOOD TEST: CPT

## 2017-09-05 PROCEDURE — 97116 GAIT TRAINING THERAPY: CPT

## 2017-09-05 PROCEDURE — 97110 THERAPEUTIC EXERCISES: CPT

## 2017-09-05 PROCEDURE — 25010000002 HYDROMORPHONE PER 4 MG: Performed by: PHYSICIAN ASSISTANT

## 2017-09-05 PROCEDURE — 25010000002 ONDANSETRON PER 1 MG: Performed by: PHYSICIAN ASSISTANT

## 2017-09-05 RX ORDER — LEVOFLOXACIN 500 MG/1
500 TABLET, FILM COATED ORAL
Status: DISCONTINUED | OUTPATIENT
Start: 2017-09-06 | End: 2017-09-05 | Stop reason: HOSPADM

## 2017-09-05 RX ORDER — AMLODIPINE BESYLATE 5 MG/1
5 TABLET ORAL DAILY
Qty: 90 TABLET | Refills: 1 | Status: SHIPPED | OUTPATIENT
Start: 2017-09-05 | End: 2018-03-28 | Stop reason: SDUPTHER

## 2017-09-05 RX ORDER — METOPROLOL TARTRATE 50 MG/1
50 TABLET, FILM COATED ORAL EVERY 12 HOURS SCHEDULED
Qty: 180 TABLET | Refills: 1 | Status: SHIPPED | OUTPATIENT
Start: 2017-09-05 | End: 2018-02-02 | Stop reason: SDUPTHER

## 2017-09-05 RX ORDER — HYDROCODONE BITARTRATE AND ACETAMINOPHEN 7.5; 325 MG/1; MG/1
1 TABLET ORAL EVERY 6 HOURS PRN
Qty: 30 TABLET | Refills: 0 | OUTPATIENT
Start: 2017-09-05 | End: 2017-09-30

## 2017-09-05 RX ORDER — ATORVASTATIN CALCIUM 40 MG/1
40 TABLET, FILM COATED ORAL NIGHTLY
Qty: 90 TABLET | Refills: 1 | Status: SHIPPED | OUTPATIENT
Start: 2017-09-05 | End: 2017-12-22 | Stop reason: HOSPADM

## 2017-09-05 RX ORDER — LEVOFLOXACIN 500 MG/1
500 TABLET, FILM COATED ORAL EVERY 24 HOURS
Qty: 7 TABLET | Refills: 0 | Status: SHIPPED | OUTPATIENT
Start: 2017-09-05 | End: 2017-09-12

## 2017-09-05 RX ADMIN — AMLODIPINE BESYLATE 5 MG: 5 TABLET ORAL at 09:26

## 2017-09-05 RX ADMIN — Medication 10 ML: at 06:04

## 2017-09-05 RX ADMIN — METOPROLOL TARTRATE 50 MG: 50 TABLET ORAL at 09:25

## 2017-09-05 RX ADMIN — INSULIN LISPRO 4 UNITS: 100 INJECTION, SOLUTION INTRAVENOUS; SUBCUTANEOUS at 12:14

## 2017-09-05 RX ADMIN — PANTOPRAZOLE SODIUM 40 MG: 40 TABLET, DELAYED RELEASE ORAL at 06:02

## 2017-09-05 RX ADMIN — BUMETANIDE 2 MG: 1 TABLET ORAL at 09:26

## 2017-09-05 RX ADMIN — COLCHICINE 0.6 MG: 0.6 TABLET, FILM COATED ORAL at 09:26

## 2017-09-05 RX ADMIN — Medication 10 ML: at 11:27

## 2017-09-05 RX ADMIN — INSULIN DETEMIR 6 UNITS: 100 INJECTION, SOLUTION SUBCUTANEOUS at 09:25

## 2017-09-05 RX ADMIN — INSULIN LISPRO 4 UNITS: 100 INJECTION, SOLUTION INTRAVENOUS; SUBCUTANEOUS at 09:57

## 2017-09-05 RX ADMIN — Medication 250 MG: at 09:26

## 2017-09-05 RX ADMIN — GABAPENTIN 300 MG: 300 CAPSULE ORAL at 09:25

## 2017-09-05 RX ADMIN — LEVOFLOXACIN 750 MG: 750 INJECTION, SOLUTION INTRAVENOUS at 11:27

## 2017-09-05 RX ADMIN — HYDROMORPHONE HYDROCHLORIDE 0.5 MG: 1 INJECTION, SOLUTION INTRAMUSCULAR; INTRAVENOUS; SUBCUTANEOUS at 09:39

## 2017-09-05 RX ADMIN — DOCUSATE SODIUM 100 MG: 100 CAPSULE, LIQUID FILLED ORAL at 09:26

## 2017-09-05 RX ADMIN — ASPIRIN 325 MG: 325 TABLET, DELAYED RELEASE ORAL at 09:26

## 2017-09-05 RX ADMIN — POTASSIUM CHLORIDE 20 MEQ: 750 CAPSULE, EXTENDED RELEASE ORAL at 09:26

## 2017-09-05 RX ADMIN — CETIRIZINE HYDROCHLORIDE 10 MG: 10 TABLET, FILM COATED ORAL at 09:26

## 2017-09-05 RX ADMIN — CYCLOBENZAPRINE HYDROCHLORIDE 10 MG: 10 TABLET, FILM COATED ORAL at 16:42

## 2017-09-05 RX ADMIN — CYCLOBENZAPRINE HYDROCHLORIDE 10 MG: 10 TABLET, FILM COATED ORAL at 09:26

## 2017-09-05 RX ADMIN — Medication 3 ML: at 09:30

## 2017-09-05 RX ADMIN — ONDANSETRON 4 MG: 2 INJECTION INTRAMUSCULAR; INTRAVENOUS at 09:39

## 2017-09-05 NOTE — THERAPY TREATMENT NOTE
Acute Care - Physical Therapy Treatment Note  [facility]     Patient Name: Denzel Harding  : 1960  MRN: [id number]  Today's Date: 2017  Onset of Illness/Injury or Date of Surgery Date: 17  Date of Referral to PT: 17  Referring Physician: LARRY Byrnes    Admit Date: 2017    Visit Dx:    ICD-10-CM ICD-9-CM   1. Coronary artery disease involving native coronary artery of native heart with unstable angina pectoris I25.110 414.01     411.1   2. Impaired functional mobility, balance, gait, and endurance Z74.09 V49.89   3. HUTCHINS (dyspnea on exertion) R06.09 786.09   4. Diabetic polyneuropathy associated with type 2 diabetes mellitus E11.42 250.60     357.2   5. Type 2 diabetes mellitus with complication, unspecified long term insulin use status E11.8 250.90   6. Pericardial effusion I31.3 423.9     Patient Active Problem List   Diagnosis   • Coronary artery disease involving native coronary artery of native heart with unstable angina pectoris   • Lumbar radiculopathy   • Hypercholesterolemia   • Diabetic polyneuropathy associated with type 2 diabetes mellitus   • Migraine with aura and with status migrainosus   • Palpitations   • Seizure disorder   • GERD (gastroesophageal reflux disease)   • Brachial neuritis   • Cervical radiculopathy   • LOM (loss of memory)   • Anxiety   • Diabetes mellitus   • Lower back pain   • Essential hypertension   • History of CVA (cerebrovascular accident)   • Non morbid obesity due to excess calories   • Post-infarction pericarditis   • HCAP (healthcare-associated pneumonia)               Adult Rehabilitation Note       17 1024 17 1415       Rehab Assessment/Intervention    Discipline physical therapy assistant  -AS physical therapist  -LS     Document Type therapy note (daily note)  -AS therapy note (daily note)  -LS     Subjective Information agree to therapy;complains of;pain  -AS agree to therapy  -LS     Patient Effort, Rehab Treatment good  -AS       Symptoms Noted During/After Treatment fatigue;increased pain  -AS      Precautions/Limitations cardiac precautions;fall precautions;sternal precautions  -AS      Recorded by [AS] Esperanza Dawkins PTA [LS] Rosalba Fried, PT     Vital Signs    Pre SpO2 (%) 94  -AS      O2 Delivery Pre Treatment room air  -AS      Post SpO2 (%) 93  -AS      O2 Delivery Post Treatment room air  -AS      Pre Patient Position Sitting  -AS      Post Patient Position Sitting  -AS      Recorded by [AS] Esperanza Dawkins PTA      Pain Assessment    Pain Assessment 0-10  -AS 0-10  -LS     Pain Score 7  -AS 8  -LS     Post Pain Score 7  -AS 8  -LS     Pain Type Acute pain  -AS      Pain Location Chest  -AS      Pain Orientation Left  -AS      Pain Descriptors Aching  -AS      Pain Frequency Constant/continuous  -AS      Patient's Stated Pain Goal No pain  -AS      Pain Intervention(s) Repositioned;Ambulation/increased activity  -AS Ambulation/increased activity;Repositioned  -LS     Response to Interventions tolerated  -AS      Recorded by [AS] Esperanza Dawkins PTA [LS] Rosalba Fried, PT     Cognitive Assessment/Intervention    Current Cognitive/Communication Assessment functional  -AS      Orientation Status oriented x 4  -AS      Follows Commands/Answers Questions 100% of the time;able to follow single-step instructions  -AS      Personal Safety mild impairment;decreased awareness, need for assist;decreased awareness, need for safety  -AS      Personal Safety Interventions fall prevention program maintained;gait belt;nonskid shoes/slippers when out of bed  -AS      Recorded by [AS] Esperanza Dawkins PTA      Bed Mobility, Assessment/Treatment    Bed Mobility, Comment UIC  -AS UIC. Pt said it hurts to get in and OOB but declined practicing w/correct technique today. P.T. verbally reviewed technique for log rolling in and out of bed. Pt verbalized understanding.  -LS     Recorded by [AS] Esperanza Dawkins PTA [LS]  "Rosalba Fried, NARAYAN     Transfer Assessment/Treatment    Transfers, Sit-Stand Cameron independent  -AS independent  -LS     Transfers, Stand-Sit Cameron independent  -AS independent  -LS     Transfers, Sit-Stand-Sit, Assist Device rolling walker  -AS rolling walker  -LS     Transfer, Comment verbal cues for sternal precautions  -AS VCs to not push up with  arms. Pt appeared to be pushing up w/R hand on armrest of chair but said he was not and that his hand was \"just laying there.\"  -LS     Recorded by [AS] Esperanza Dawkins PTA [LS] Rosalba Fried, PT     Gait Assessment/Treatment    Gait, Cameron Level supervision required  -AS supervision required  -LS     Gait, Assistive Device rolling walker  -AS rolling walker  -LS     Gait, Distance (Feet) 400  -  -LS     Gait, Gait Deviations han decreased;forward flexed posture;step length decreased  -AS forward flexed posture;han decreased;bilateral:;step length decreased  -LS     Gait, Safety Issues step length decreased  -AS      Gait, Impairments strength decreased;pain  -AS      Gait, Comment patient with pain during ambulation with several short standing rest breaks.  -AS      Recorded by [AS] Esperanza Dawkins PTA [LS] Rosalba Fried, PT     Therapy Exercises    Bilateral Lower Extremities AROM:;10 reps;sitting;ankle pumps/circles;hip flexion;LAQ  -AS AROM:;10 reps;sitting;ankle pumps/circles;hip flexion;LAQ  -LS     Bilateral Upper Extremity  AROM:;10 reps;sitting;elbow flexion/extension;shoulder abduction/adduction;shoulder extension/flexion  -LS     Recorded by [AS] Esperanza Dawkins PTA [LS] Rosalba Fried, NARAYAN     Positioning and Restraints    Pre-Treatment Position sitting in chair/recliner  -AS sitting in chair/recliner  -LS     Post Treatment Position chair  -AS chair  -LS     In Chair sitting;call light within reach;encouraged to call for assist;with family/caregiver  -AS notified nsg;sitting;call light " within reach;with family/caregiver;legs elevated  -LS     Recorded by [AS] Esperanza Dawkins, PTA [LS] Rosalba Fried, PT       User Key  (r) = Recorded By, (t) = Taken By, (c) = Cosigned By    Initials Name Effective Dates    AS Esperanza Dawkins, PTA 06/22/15 -     LS Rosalba Fried, PT 06/19/15 -                 IP PT Goals       09/05/17 1110 09/04/17 1445 09/01/17 1716    Bed Mobility PT LTG    Bed Mobility PT LTG, Date Goal Reviewed 09/05/17  -AS      Bed Mobility PT LTG, Outcome goal ongoing  -AS goal ongoing  -LS goal ongoing  -LSA    Transfer Training PT LTG    Transfer Training PT  LTG, Date Goal Reviewed 09/05/17  -AS      Transfer Training PT LTG, Outcome goal met  -AS goal ongoing  -LS goal ongoing  -LSA    Gait Training PT LTG    Gait Training Goal PT LTG, Date Goal Reviewed 09/05/17  -AS      Gait Training Goal PT LTG, Outcome goal ongoing  -AS goal ongoing  -LS goal ongoing  -LSA    Stair Training PT LTG    Stair Training Goal PT LTG, Date Goal Reviewed 09/05/17  -AS      Stair Training Goal PT LTG, Outcome goal ongoing  -AS goal ongoing  -LS goal ongoing  -LSA      08/30/17 0941 08/29/17 1200 08/28/17 0952    Bed Mobility PT LTG    Bed Mobility PT LTG, Date Goal Reviewed   08/28/17  -LSA    Bed Mobility PT LTG, Outcome goal ongoing  -LSA goal ongoing  -DM goal ongoing  -LSA    Transfer Training PT LTG    Transfer Training PT  LTG, Date Goal Reviewed   08/28/17  -LSA    Transfer Training PT LTG, Outcome goal ongoing  -LSA goal ongoing  -DM goal ongoing  -LSA    Gait Training PT LTG    Gait Training Goal PT LTG, Date Goal Reviewed   08/28/17  -LSA    Gait Training Goal PT LTG, Outcome goal ongoing  -LSA goal ongoing  -DM goal ongoing  -LSA    Stair Training PT LTG    Stair Training Goal PT LTG, Date Goal Reviewed   08/28/17  -LSA    Stair Training Goal PT LTG, Outcome goal ongoing  -LSA goal ongoing  -DM goal ongoing  -LSA      08/24/17 1040 08/23/17 1115       Bed Mobility PT LTG    Bed  Mobility PT LTG, Outcome goal ongoing  -DM goal ongoing  -DM     Transfer Training PT LTG    Transfer Training PT LTG, Outcome goal ongoing  -DM goal ongoing  -DM     Gait Training PT LTG    Gait Training Goal PT LTG, Date Established 08/16/17  -DM 08/16/17  -DM     Gait Training Goal PT LTG, Time to Achieve 2 wks  -DM 2 wks  -DM     Gait Training Goal PT LTG, [place] Level independent   stable VS;O2 > 90%  -DM independent  -DM     Gait Training Goal PT LTG, Assist Device cane, straight  -DM      Gait Training Goal PT LTG, Distance to Achieve 700   MET dist. goal of 500 ft;new goal 700' w/SPC  -   MET GOAL  FT;NEW GOAL 8-23  -DM     Gait Training Goal PT LTG, Date Goal Reviewed 08/24/17  -DM 08/23/17  -DM     Gait Training Goal PT LTG, Outcome goal revised  -DM goal revised  -DM     Stair Training PT LTG    Stair Training Goal PT LTG, Outcome goal ongoing  -DM goal ongoing  -DM       User Key  (r) = Recorded By, (t) = Taken By, (c) = Cosigned By    Initials Name Provider Type    DM Cris Amado, PT Physical Therapist    AS Esperanza Dawkins, PTA Physical Therapy Assistant    LS Rosalba Fried, PT Physical Therapist    LSA Kathrin Mera, PT Physical Therapist          Physical Therapy Education     Title: PT OT SLP Therapies (Active)     Topic: Physical Therapy (Active)     Point: Mobility training (Active)    Learning Progress Summary    Learner Readiness Method Response Comment Documented by Status   Patient Acceptance E NR  AS 09/05/17 1110 Active    Acceptance E,D NR  LS 09/01/17 1716 Active    Acceptance E NR  SJ 08/31/17 1109 Active    Acceptance E,D NR  LS 08/30/17 0941 Active    Eager D,E NR  DM 08/29/17 1200 Active    Acceptance E,D NR  LS 08/28/17 0952 Active    Eager D,E NR  DM 08/24/17 1040 Active    Eager E,D NR  DM 08/23/17 1115 Active    Acceptance E NR  AS 08/21/17 1122 Active    Acceptance E,D DU,NR  UD 08/19/17 1133 Done    Eager E,D NR  DM 08/18/17 1149 Active     Acceptance E NR  KR 08/17/17 1103 Active    Acceptance E NR  KR 08/16/17 1147 Active   Family Acceptance E NR  AS 09/05/17 1110 Active    Acceptance E NR  AS 08/21/17 1122 Active    Acceptance E,D DU,NR  UD 08/19/17 1133 Done   Significant Other Eager E,D NR  DM 08/23/17 1115 Active    Eager E,D NR  DM 08/18/17 1149 Active               Point: Home exercise program (Active)    Learning Progress Summary    Learner Readiness Method Response Comment Documented by Status   Patient Acceptance E NR  AS 09/05/17 1110 Active    Acceptance E,H VU,NR HEP of progressive amb beginning with 4-5 mins and progressing as able to 20-30 mins as well as sitting exs to be done X 10 reps 2-3X/day (hip flex, LAQ, aps, sh flexion, sh abd w/elbows flexed, elbow flex/ext). LS1 09/04/17 1442 Done    Acceptance E,D NR   09/01/17 1716 Active    Acceptance E NR   08/31/17 1109 Active    Acceptance E,D NR   08/30/17 0941 Active    Eager D,E NR  DM 08/29/17 1200 Active    Acceptance E,D NR   08/28/17 0952 Active    Eager D,E NR  DM 08/24/17 1040 Active    Eager E,D NR  DM 08/23/17 1115 Active    Acceptance E NR  AS 08/21/17 1122 Active    Acceptance E,D DU,NR  UD 08/19/17 1133 Done    Eager E,D NR  DM 08/18/17 1149 Active   Family Acceptance E NR  AS 09/05/17 1110 Active    Acceptance E NR  AS 08/21/17 1122 Active    Acceptance E,D DU,NR  UD 08/19/17 1133 Done   Significant Other Eager E,D NR  DM 08/23/17 1115 Active    Eager E,D NR  DM 08/18/17 1149 Active               Point: Body mechanics (Active)    Learning Progress Summary    Learner Readiness Method Response Comment Documented by Status   Patient Acceptance E NR  AS 09/05/17 1110 Active    Acceptance E,D NR  LS 09/01/17 1716 Active    Acceptance E NR   08/31/17 1109 Active    Acceptance E,D NR   08/30/17 0941 Active    Eager D,E NR  DM 08/29/17 1200 Active    Acceptance ONUR,D NR  LS 08/28/17 0952 Active    ONUR Novak NR  DM 08/24/17 1040 Active    KENN Noguera NR  DM 08/23/17 1115  "Active    Acceptance E NR  AS 08/21/17 1122 Active    Acceptance E,D DU,NR  UD 08/19/17 1133 Done    Eager E,D NR  DM 08/18/17 1149 Active    Acceptance E NR  KR 08/17/17 1103 Active    Acceptance E NR  KR 08/16/17 1147 Active   Family Acceptance E NR  AS 09/05/17 1110 Active    Acceptance E NR  AS 08/21/17 1122 Active    Acceptance E,D DU,NR  UD 08/19/17 1133 Done   Significant Other Eager E,D NR  DM 08/23/17 1115 Active    Eager E,D NR  DM 08/18/17 1149 Active               Point: Precautions (Active)    Learning Progress Summary    Learner Readiness Method Response Comment Documented by Status   Patient Acceptance E NR  AS 09/05/17 1110 Active    Acceptance E VU Reviewed cardiac precautions; pt verbalized understanding. Also educated pt to avoid pushing to stand using arms. Pt appeared to be pushing up w/RUE but said he was not and that his hand was \"just laying there.\" LS1 09/04/17 1443 Done    Acceptance E,D NR   09/01/17 1716 Active    Acceptance E NR   08/31/17 1109 Active    Acceptance E,D NR   08/30/17 0941 Active    Eager D,E NR  DM 08/29/17 1200 Active    Acceptance E,D NR   08/28/17 0952 Active    Eager D,E NR  DM 08/24/17 1040 Active    Eager E,D NR  DM 08/23/17 1115 Active    Acceptance E NR  AS 08/21/17 1122 Active    Acceptance E,D DU,NR   08/19/17 1133 Done    Eager E,D NR  DM 08/18/17 1149 Active    Acceptance E NR  KR 08/17/17 1103 Active    Acceptance E NR  KR 08/16/17 1147 Active   Family Acceptance E NR  AS 09/05/17 1110 Active    Acceptance E NR  AS 08/21/17 1122 Active    Acceptance E,D DU,NR   08/19/17 1133 Done   Significant Other Eager E,D NR  DM 08/23/17 1115 Active    Eager E,D NR  DM 08/18/17 1149 Active                      User Key     Initials Effective Dates Name Provider Type Discipline     06/22/15 -  Jailene Soliz PTA Physical Therapy Assistant PT     06/19/15 -  Leticia Ag, PT Physical Therapist PT    DM 06/19/15 -  Cris Amado, PT Physical Therapist PT "    AS 06/22/15 -  Esperanza Dawkins PTA Physical Therapy Assistant PT    LS1 06/19/15 -  Rosalba Fried, PT Physical Therapist PT    LS 06/19/15 -  Kathrin Mera, PT Physical Therapist PT    KR 05/30/17 -  Deann Rodriguez, PT Student PT Student PT                    PT Recommendation and Plan  Anticipated Discharge Disposition: home with assist  PT Frequency: daily  Plan of Care Review  Plan Of Care Reviewed With: patient  Progress: progress toward functional goals as expected  Outcome Summary/Follow up Plan: patient doing well with all activity, continues to have pain at previous chest tube site adn SOA with gait.          Outcome Measures       09/05/17 1024 09/04/17 1415       How much help from another person do you currently need...    Turning from your back to your side while in flat bed without using bedrails? 3  -AS 3  -LS     Moving from lying on back to sitting on the side of a flat bed without bedrails? 3  -AS 2  -LS     Moving to and from a bed to a chair (including a wheelchair)? 3  -AS 3  -LS     Standing up from a chair using your arms (e.g., wheelchair, bedside chair)? 3  -AS 3  -LS     Climbing 3-5 steps with a railing? 3  -AS 3  -LS     To walk in hospital room? 3  -AS 3  -LS     AM-PAC 6 Clicks Score 18  -AS 17  -LS     Functional Assessment    Outcome Measure Options AM-PAC 6 Clicks Basic Mobility (PT)  -AS AM-PAC 6 Clicks Basic Mobility (PT)  -LS       User Key  (r) = Recorded By, (t) = Taken By, (c) = Cosigned By    Initials Name Provider Type    AS Esperanza Dawkins PTA Physical Therapy Assistant     Rosalba Fried, PT Physical Therapist           Time Calculation:         PT Charges       09/05/17 1112          Time Calculation    Start Time 1024  -AS      PT Received On 09/05/17  -AS      PT Goal Re-Cert Due Date 09/07/17  -AS      Time Calculation- PT    Total Timed Code Minutes- PT 23 minute(s)  -AS        User Key  (r) = Recorded By, (t) = Taken By, (c) = Cosigned By     Initials Name Provider Type    AS Esperanza Dawkins PTA Physical Therapy Assistant          Therapy Charges for Today     Code Description Service Date Service Provider Modifiers Qty    56724995741 HC GAIT TRAINING EA 15 MIN 9/5/2017 Esperanza Dawkins PTA GP 1    78063620117 HC PT THER PROC EA 15 MIN 9/5/2017 Esperanza Dawkins PTA GP 1          PT G-Codes  Outcome Measure Options: AM-PAC 6 Clicks Basic Mobility (PT)    Esperanza Dawkins PTA  9/5/2017

## 2017-09-05 NOTE — PROGRESS NOTES
"    [facility] Medicine Services  INPATIENT PROGRESS NOTE    Date of Admission: 8/11/2017  Length of Stay: 25  Primary Care Physician: Ottoniel Toledo MD    Subjective   CC: Follow-up med management post CABG    HPI:  Patient is seen at 9:55 AM resting upright in chair in no acute distress.  Wife at bedside.  Patient states \"I feel much better today\".  Reports slowly improving appetite.  Tolerating liquids well.  Working with PT/OT well.  On room air and saturations WNL.  Reports his swelling in his legs has improved.  Decreased cough.  Tolerating diet.  Denies nausea, vomiting, chest pain, shortness of air.  No new issues.  Feels ready for discharge home today pending primary team.      Review of Systems  Constitutional: Positive mild fatigue. egative for chills, diaphoresis and fever.   HENT: Positive for congestion.    Respiratory: Positive for cough and shortness of breath( improved ).    Cardiovascular: Positive for leg swelling (almost resolved.). Negative for chest pain and palpitations.   Gastrointestinal: Negative for constipation, diarrhea, nausea and vomiting.   Genitourinary: Negative for decreased urine volume and difficulty urinating.   Musculoskeletal: Negative for myalgias.   All other systems reviewed and are negative.    Objective      Temp:  [97.7 °F (36.5 °C)-98.9 °F (37.2 °C)] 97.7 °F (36.5 °C)  Heart Rate:  [82-88] 87  Resp:  [18-20] 20  BP: (107-139)/(70-79) 139/79     Physical Exam      NAD, sitting upright in chair, calm and cooperative with wife at bs.  Neck : supple, no lymphadenopathy palpable.Trachea midline   Cardiovascular: S1 and S2 present, still tachycardia.  No murmur gallop gallops or rubs audible.  Lungs: Decreased bases bilaterally, but improved BS, only slightly decreased now  Sternal wound clean/d/i and GUNNAR.  Old epigastric MT sites c/d/i, healing.  Drsg to Lt lower chest d/c/i. no erythema  GI: Obese, Soft, +BS, ND, NT  Trace BLE edema, Healing Rt inner " knee VH sites.  C/d/i.   Normal affect, calm and cooperative     Results Review:    I have reviewed the labs, radiology results and diagnostic studies.      Results from last 7 days  Lab Units 09/03/17  0440   WBC 10*3/mm3 5.18   HEMOGLOBIN g/dL 8.5*   PLATELETS 10*3/mm3 205       Results from last 7 days  Lab Units 09/03/17  0440   SODIUM mmol/L 137   POTASSIUM mmol/L 3.9   CHLORIDE mmol/L 104   CO2 mmol/L 26.0   BUN mg/dL 14   CREATININE mg/dL 0.80   GLUCOSE mg/dL 217*   CALCIUM mg/dL 9.0       Culture Data: Cultures:    Blood Culture   Date Value Ref Range Status   09/04/2017 No growth at less than 24 hours  Preliminary   09/04/2017 No growth at less than 24 hours  Preliminary   09/01/2017 Staphylococcus haemolyticus (A)  Preliminary     Comment:     This isolate is presumed to be clindamycin resistant based on detection of inducible clindamycin resistance.  Clindamycin may still be effective in some patients.   09/01/2017 No growth at 3 days  Preliminary     Respiratory Culture   Date Value Ref Range Status   08/31/2017 Light growth (2+) Klebsiella pneumoniae (A)  Final   08/31/2017 Light growth (2+) Normal Respiratory Ana M  Final       Radiology Data:   Imaging Results (last 24 hours)     ** No results found for the last 24 hours. **            I have reviewed the medications.    Assessment/Plan     Problem List  Hospital Problem List     Hypercholesterolemia    Overview Signed 11/2/2016  9:34 AM by Shannon Torres      Assessed By: Yasmin Millard (Cardiology); Last Assessed: 13 Aug 2015         Diabetic polyneuropathy associated with type 2 diabetes mellitus    Seizure disorder    GERD (gastroesophageal reflux disease)    Essential hypertension    History of CVA (cerebrovascular accident)    Non morbid obesity due to excess calories    Post-infarction pericarditis    HCAP (healthcare-associated pneumonia)            Mr. Harding is a 56yo male with multivessel CAD with LHC by Dr. Kim on 8/11/17 and CABG x3  "by Dr. Damon on 8/15/17. Developed postop pericarditis w/effusion s/p left thoracotomy and pericardial window 8/25/17. Hospitalist Consulted upon transfer to acute care by CTS,  for medical management.       PER CT SURGERY/CARDIOLOGY  --Multivessel CAD s/p CABG x 3 on 8/15/17  Dr. Damon  --Post op pericarditis w/effusion s/p left anterior thoracotomy and pericardial window 8/25/17  --Hypertension, well controlled  --Diuresis per cards      Insulin-dependent DMII with peripheral neuropathy  - Hgb A1c this admission 8.9%  - He reports blood sugars at home are \"not good\" usually 200-300 range however wishes to return home on current insulin regimen and f/u closely with PCP to further adjust.   - Monitor glucose, titrate insulin as need, slowly improving appetite  --will likely need further home insulin dose adjustments per PCP to better control his dm......Defer to PCP at 1 week f/u per pt.      HCAP PNA, gram negative pneumonia (kleb pneumonia-- sens to levoquin)  --changed to PO levaquin.  Will continue for 7 days on dc to complete course.    --f/u pcp     +Blood culture (9/1/17)  --suspected contaminant,   --blood cxs recollected yesterday 9/4 per Dr Sanchez orders.  Suspect still contaminant, pt asymptomatic, afebrile.  Cxs with NGTD at < 24 hours but await final culture        Seizure disorder, stable continue Topamax        GERD, continue PPI     History of CVA, without residual. Continue ASA and statin      Morbid obesity (BMI 39), continue to encourage dietary compliance and lifestyle modification       Plan:  - cont current care.  - repeat BC with NGTD <24 hours  - home likely today per primary team.  Pt awake, alert, hemodynamically stable, afebrile.    -will need f/u with PCP within 1 week with glucose log.  Likely to need further dm meds/insulins adjusted to better control his dm.    -follow new Bld cxs from yesterday 9/4 for any growth.  NGTD at <24 hrs.    -home on po levaquin x 7 more days to complete " course for PNA.          Jerri Ojeda, APRN   09/05/17   11:46 AM

## 2017-09-05 NOTE — PLAN OF CARE
Problem: Patient Care Overview (Adult)  Goal: Plan of Care Review  Outcome: Ongoing (interventions implemented as appropriate)    09/05/17 1047   Coping/Psychosocial Response Interventions   Plan Of Care Reviewed With patient   Patient Care Overview   Progress improving   Outcome Evaluation   Outcome Summary/Follow up Plan pt being d/c'd today       Goal: Adult Individualization and Mutuality  Outcome: Ongoing (interventions implemented as appropriate)    09/05/17 1047   Individualization   Patient Specific Goals pt is ready to go home   Patient Specific Interventions facilitate d/c         Problem: Cardiac Surgery (Adult)  Goal: Signs and Symptoms of Listed Potential Problems Will be Absent or Manageable (Cardiac Surgery)  Outcome: Ongoing (interventions implemented as appropriate)    09/05/17 1047   Cardiac Surgery   Problems Assessed (Cardiac Surgery) all   Problems Present (Cardiac Surgery) pain         Problem: Skin Integrity Impairment, Risk/Actual (Adult)  Goal: Identify Related Risk Factors and Signs and Symptoms  Outcome: Ongoing (interventions implemented as appropriate)  Goal: Skin Integrity/Wound Healing  Outcome: Ongoing (interventions implemented as appropriate)    09/05/17 1047   Skin Integrity Impairment, Risk/Actual (Adult)   Skin Integrity/Wound Healing making progress toward outcome

## 2017-09-05 NOTE — PLAN OF CARE
Problem: Patient Care Overview (Adult)  Goal: Plan of Care Review  Outcome: Ongoing (interventions implemented as appropriate)    09/05/17 0252   Patient Care Overview   Progress progress towards functional goals is fair   Outcome Evaluation   Outcome Summary/Follow up Plan Pt ambulated three times today       Goal: Adult Individualization and Mutuality    09/05/17 0252   Individualization   Patient Specific Goals Pt ambulate 3 times per day         Problem: Skin Integrity Impairment, Risk/Actual (Adult)  Goal: Identify Related Risk Factors and Signs and Symptoms  Outcome: Ongoing (interventions implemented as appropriate)    09/05/17 0252   Skin Integrity Impairment, Risk/Actual   Skin Integrity Impairment, Risk/Actual: Related Risk Factors age extremes       Goal: Skin Integrity/Wound Healing  Outcome: Ongoing (interventions implemented as appropriate)    09/05/17 0252   Skin Integrity Impairment, Risk/Actual (Adult)   Skin Integrity/Wound Healing making progress toward outcome

## 2017-09-05 NOTE — PROGRESS NOTES
Continued Stay Note  [facility]     Patient Name: Denzel Harding  MRN: [id number]  Today's Date: 9/5/2017    Admit Date: 8/11/2017          Discharge Plan       09/05/17 1217    Case Management/Social Work Plan    Plan HOME    Patient/Family In Agreement With Plan yes    Additional Comments Met again with pt and wife at bedside.  Anticipated DC today.  Plan remains to return home with wife's assistance prn.  No immediate needs identified/voiced.  CM will cont to follow.              Discharge Codes     None        Expected Discharge Date and Time     Expected Discharge Date Expected Discharge Time    Sep 5, 2017             Leticia Zaman

## 2017-09-05 NOTE — PROGRESS NOTES
"[facility]  IP Progress Note      Chief Complaint: CHF, htn, pericarditis       Subjective:  Functional status improving.  Ambulating without difficulty.  Telemetry reveals sinus rhythm.     Objective:  Blood pressure 139/79, pulse 87, temperature 97.7 °F (36.5 °C), temperature source Oral, resp. rate 20, height 70\" (177.8 cm), weight 291 lb 9.6 oz (132 kg), SpO2 95 %.     Intake/Output Summary (Last 24 hours) at 09/05/17 1037  Last data filed at 09/04/17 1830   Gross per 24 hour   Intake              390 ml   Output                0 ml   Net              390 ml       Physical Exam:  General: No acute distress.   Neck: no JVD.  Chest:Clear throughout bilaterally, normal Restoril for  Cardiovascular: Normal S1 and S2, no murmurs rubs or gallops Extremities: Trivial bilateral pretibial edema    Results Review:     I reviewed the patient's new clinical results.      Results from last 7 days  Lab Units 09/03/17  0440   WBC 10*3/mm3 5.18   HEMOGLOBIN g/dL 8.5*   HEMATOCRIT % 26.1*   PLATELETS 10*3/mm3 205       Results from last 7 days  Lab Units 09/03/17  0440   SODIUM mmol/L 137   POTASSIUM mmol/L 3.9   CHLORIDE mmol/L 104   CO2 mmol/L 26.0   BUN mg/dL 14   CREATININE mg/dL 0.80   CALCIUM mg/dL 9.0   GLUCOSE mg/dL 217*       Results from last 7 days  Lab Units 09/03/17  0440   SODIUM mmol/L 137   POTASSIUM mmol/L 3.9   CHLORIDE mmol/L 104   CO2 mmol/L 26.0   BUN mg/dL 14   CREATININE mg/dL 0.80   GLUCOSE mg/dL 217*   CALCIUM mg/dL 9.0         Lab Results   Component Value Date    CKTOTAL 66 04/11/2017    CKMB 0.5 11/28/2015    CKMBINDEX CKMB Index not calculated when CK Total <80 11/28/2015    TROPONINI <0.006 08/13/2017                   Tele: Sinus Rhythm       Assessment/Plan:  1. Post op pericarditis with effusion s/p left anterior thoracotomy and pericardial window 8/25/17  2. CABG x 3, LAD endarterectomy 8/15/17 Dr. Damon, Normal EF, DHF  3. HTN: Controlled, BB and " Norvasc  4. CVA: Remote  5. DM  6. COPD/EDD  7. HLD: Statin    PLAN:  · Stable for discharge on current medical therapy  · F/u with cardiology in 2-4 weeks

## 2017-09-05 NOTE — PLAN OF CARE
Problem: Patient Care Overview (Adult)  Goal: Plan of Care Review  Outcome: Ongoing (interventions implemented as appropriate)    09/05/17 1110   Coping/Psychosocial Response Interventions   Plan Of Care Reviewed With patient   Patient Care Overview   Progress progress toward functional goals as expected   Outcome Evaluation   Outcome Summary/Follow up Plan patient doing well with all activity, continues to have pain at previous chest tube site adn SOA with gait.         Problem: Inpatient Physical Therapy  Goal: Bed Mobility Goal LTG- PT  Outcome: Ongoing (interventions implemented as appropriate)    08/16/17 1148 09/05/17 1110   Bed Mobility PT LTG   Bed Mobility PT LTG, Date Established 08/16/17 --    Bed Mobility PT LTG, Time to Achieve 2 wks --    Bed Mobility PT LTG, Activity Type supine to sit/sit to supine --    Bed Mobility PT LTG, [place] Level independent --    Bed Mobility PT LTG, Date Goal Reviewed --  09/05/17   Bed Mobility PT LTG, Outcome --  goal ongoing       Goal: Transfer Training Goal 1 LTG- PT  Outcome: Outcome(s) achieved Date Met:  09/05/17 08/16/17 1148 09/05/17 1110   Transfer Training PT LTG   Transfer Training PT LTG, Date Established 08/16/17 --    Transfer Training PT LTG, Time to Achieve 2 wks --    Transfer Training PT LTG, Activity Type sit to stand/stand to sit --    Transfer Training PT LTG, [place] Level independent --    Transfer Training PT LTG, Date Goal Reviewed --  09/05/17   Transfer Training PT LTG, Outcome --  goal met       Goal: Gait Training Goal LTG- PT  Outcome: Ongoing (interventions implemented as appropriate)    08/24/17 1040 09/05/17 1110   Gait Training PT LTG   Gait Training Goal PT LTG, Date Established 08/16/17 --    Gait Training Goal PT LTG, Time to Achieve 2 wks --    Gait Training Goal PT LTG, [place] Level independent  (stable VS;O2 > 90%) --    Gait Training Goal PT LTG, Assist Device cane, straight --    Gait Training Goal PT LTG,  Distance to Achieve 700  (MET dist. goal of 500 ft;new goal 700' w/SPC) --    Gait Training Goal PT LTG, Date Goal Reviewed --  09/05/17   Gait Training Goal PT LTG, Outcome --  goal ongoing       Goal: Stair Training Goal LTG- PT  Outcome: Ongoing (interventions implemented as appropriate)    08/16/17 1148 09/05/17 1110   Stair Training PT LTG   Stair Training Goal PT LTG, Date Established 08/16/17 --    Stair Training Goal PT LTG, Time to Achieve 2 wks --    Stair Training Goal PT LTG, Number of Steps 3 --    Stair Training Goal PT LTG, Door Level supervision required --    Stair Training Goal PT LTG, Date Goal Reviewed --  09/05/17   Stair Training Goal PT LTG, Outcome --  goal ongoing

## 2017-09-05 NOTE — PROGRESS NOTES
Cardiothoracic Surgery Progress Note      s/p CABG x 3 8/15/17  S/p pericardial window 8/25/17       LOS: 25 days      Subjective:  No complaints this morning.  Denies shortness of breath.  Ambulating in halls      Objective:  Vital Signs  Temp:  [98.4 °F (36.9 °C)-99.4 °F (37.4 °C)] 98.9 °F (37.2 °C)  Heart Rate:  [82-91] 88  Resp:  [18-20] 20  BP: (107-146)/(70-80) 115/72    Physical Exam:   General Appearance: alert, appears stated age and cooperative   Lungs: clear to auscultation, respirations regular, respirations even and respirations unlabored   Heart: regular rhythm & normal rate, normal S1, S2 and no murmur, no reid, no rub   Skin: Incisions c/d/i     Results:    Results from last 7 days  Lab Units 09/03/17  0440   WBC 10*3/mm3 5.18   HEMOGLOBIN g/dL 8.5*   HEMATOCRIT % 26.1*   PLATELETS 10*3/mm3 205       Results from last 7 days  Lab Units 09/03/17  0440   SODIUM mmol/L 137   POTASSIUM mmol/L 3.9   CHLORIDE mmol/L 104   CO2 mmol/L 26.0   BUN mg/dL 14   CREATININE mg/dL 0.80   GLUCOSE mg/dL 217*   CALCIUM mg/dL 9.0         Assessment:  Multivessel coronary artery disease status post CABG followed by pericardial window for Dressler's/effusion.  Now with pneumonia    Plan:  Recent blood cultures negative at 24 hours   Transition to PO ABX as per hospitalists  ASA, statin, BB  Diuresis  D/C home    London Damon MD  09/05/17  6:51 AM

## 2017-09-06 LAB — BACTERIA SPEC AEROBE CULT: NORMAL

## 2017-09-08 ENCOUNTER — APPOINTMENT (OUTPATIENT)
Dept: LAB | Facility: HOSPITAL | Age: 57
End: 2017-09-08

## 2017-09-08 ENCOUNTER — PROCEDURE VISIT (OUTPATIENT)
Dept: CARDIOLOGY | Facility: HOSPITAL | Age: 57
End: 2017-09-08

## 2017-09-08 ENCOUNTER — OFFICE VISIT (OUTPATIENT)
Dept: CARDIOLOGY | Facility: HOSPITAL | Age: 57
End: 2017-09-08

## 2017-09-08 VITALS
DIASTOLIC BLOOD PRESSURE: 60 MMHG | HEIGHT: 70 IN | RESPIRATION RATE: 16 BRPM | HEART RATE: 77 BPM | OXYGEN SATURATION: 98 % | TEMPERATURE: 97.8 F | WEIGHT: 267 LBS | BODY MASS INDEX: 38.22 KG/M2 | SYSTOLIC BLOOD PRESSURE: 88 MMHG

## 2017-09-08 DIAGNOSIS — Z79.4 DIABETES MELLITUS DUE TO UNDERLYING CONDITION WITH HYPEROSMOLARITY WITHOUT COMA, WITH LONG-TERM CURRENT USE OF INSULIN (HCC): Chronic | ICD-10-CM

## 2017-09-08 DIAGNOSIS — R94.31 ABNORMAL EKG: ICD-10-CM

## 2017-09-08 DIAGNOSIS — I25.110 CORONARY ARTERY DISEASE INVOLVING NATIVE CORONARY ARTERY OF NATIVE HEART WITH UNSTABLE ANGINA PECTORIS (HCC): ICD-10-CM

## 2017-09-08 DIAGNOSIS — E08.00 DIABETES MELLITUS DUE TO UNDERLYING CONDITION WITH HYPEROSMOLARITY WITHOUT COMA, WITH LONG-TERM CURRENT USE OF INSULIN (HCC): Chronic | ICD-10-CM

## 2017-09-08 DIAGNOSIS — I10 ESSENTIAL HYPERTENSION: ICD-10-CM

## 2017-09-08 DIAGNOSIS — I24.1: ICD-10-CM

## 2017-09-08 DIAGNOSIS — R94.31 ABNORMAL EKG: Primary | ICD-10-CM

## 2017-09-08 LAB
ANION GAP SERPL CALCULATED.3IONS-SCNC: 7 MMOL/L (ref 3–11)
BACTERIA SPEC AEROBE CULT: ABNORMAL
BUN BLD-MCNC: 25 MG/DL (ref 9–23)
BUN/CREAT SERPL: 22.7 (ref 7–25)
CALCIUM SPEC-SCNC: 8.8 MG/DL (ref 8.7–10.4)
CHLORIDE SERPL-SCNC: 104 MMOL/L (ref 99–109)
CO2 SERPL-SCNC: 26 MMOL/L (ref 20–31)
CREAT BLD-MCNC: 1.1 MG/DL (ref 0.6–1.3)
GFR SERPL CREATININE-BSD FRML MDRD: 69 ML/MIN/1.73
GLUCOSE BLD-MCNC: 116 MG/DL (ref 70–100)
GRAM STN SPEC: ABNORMAL
ISOLATED FROM: ABNORMAL
POTASSIUM BLD-SCNC: 4.3 MMOL/L (ref 3.5–5.5)
SODIUM BLD-SCNC: 137 MMOL/L (ref 132–146)

## 2017-09-08 PROCEDURE — 93005 ELECTROCARDIOGRAM TRACING: CPT

## 2017-09-08 PROCEDURE — 36415 COLL VENOUS BLD VENIPUNCTURE: CPT | Performed by: NURSE PRACTITIONER

## 2017-09-08 PROCEDURE — 80048 BASIC METABOLIC PNL TOTAL CA: CPT | Performed by: NURSE PRACTITIONER

## 2017-09-08 PROCEDURE — 99213 OFFICE O/P EST LOW 20 MIN: CPT | Performed by: NURSE PRACTITIONER

## 2017-09-08 PROCEDURE — 93010 ELECTROCARDIOGRAM REPORT: CPT | Performed by: INTERNAL MEDICINE

## 2017-09-08 RX ORDER — COLCHICINE 0.6 MG/1
0.6 TABLET ORAL DAILY
Qty: 30 TABLET | Refills: 2 | Status: SHIPPED | OUTPATIENT
Start: 2017-09-08 | End: 2017-10-16 | Stop reason: SDUPTHER

## 2017-09-08 RX ORDER — POTASSIUM CHLORIDE 20 MEQ/1
TABLET, EXTENDED RELEASE ORAL
Qty: 15 TABLET | Refills: 1 | Status: SHIPPED | OUTPATIENT
Start: 2017-09-08 | End: 2018-03-02

## 2017-09-08 RX ORDER — FUROSEMIDE 40 MG/1
TABLET ORAL
Qty: 15 TABLET | Refills: 1 | Status: ON HOLD | OUTPATIENT
Start: 2017-09-08 | End: 2017-12-18

## 2017-09-08 RX ORDER — INDOMETHACIN 75 MG/1
75 CAPSULE, EXTENDED RELEASE ORAL 2 TIMES DAILY WITH MEALS
Qty: 28 CAPSULE | Refills: 0 | Status: SHIPPED | OUTPATIENT
Start: 2017-09-08 | End: 2017-09-08

## 2017-09-08 NOTE — PROGRESS NOTES
Subjective:     Encounter Date:09/08/2017      Patient ID: Denzel Harding is a 57 y.o. male.    Chief Complaint:  Hospital follow up re:  Post CABG pericarditis    History of Present Illness:  Mr. Harding comes into the heart and valve clinic for short interval follow-up after dismissal on 9/5/2017 post CABG performed on 8/11/2017 complicated by post infarction pericarditis requiring pericardial window and later pneumonia requiring antibiotics.  Patient states he is understandably very glad to be home.  He states he is feeling a little better every day.  A little less dyspneic every day.  His main question is about the length of time he should continue the Indocin.    Past Medical History:   Diagnosis Date   • Anxiety    • Arthritis    • Asthma    • BiPAP (biphasic positive airway pressure) dependence    • Brachial neuritis 1/19/2017   • Chest pain    • COPD (chronic obstructive pulmonary disease)    • Depression    • Diabetes mellitus    • Dizziness    • Edema    • GERD (gastroesophageal reflux disease)    • H/O chest x-ray 07/04/2015    Mild Left base atelectasis   • H/O echocardiogram 07/05/2015    Normal LVSF. EF of 60-65%. Grade 1 diastolic dysfunction of the LV myocardium. No evidence of pericardial effusion   • H/O exercise stress test    • History of herniated intervertebral disc     History of left L5-S1 disc herniation   • LOM (loss of memory) 1/19/2017   • Lower back pain     Right   • Measles    • Obstructive sleep apnea    • Palpitations    • Seizure disorder    • Shortness of breath 1/19/2017   • SOB (shortness of breath)    • Stroke    • Wears glasses        Past Surgical History:   Procedure Laterality Date   • BACK SURGERY     • CARDIAC CATHETERIZATION     • CARDIAC CATHETERIZATION N/A 8/11/2017    Procedure: Coronary angiography;  Surgeon: Dallas Kim MD;  Location: [facility] CATH INVASIVE LOCATION;  Service:    • CARDIAC CATHETERIZATION N/A 8/11/2017    Procedure: Left Heart Cath;  Surgeon:  Dallas Hernandez MD;  Location: [facility] CATH INVASIVE LOCATION;  Service:    • CARDIAC CATHETERIZATION N/A 8/11/2017    Procedure: Left ventriculography;  Surgeon: Dallas Hernandez MD;  Location: [facility] CATH INVASIVE LOCATION;  Service:    • CARDIOVASCULAR STRESS TEST  07/03/2017    WITH DR HERNANDEZ AT [facility]   • CARPAL TUNNEL RELEASE Right    • CATARACT EXTRACTION W/ INTRAOCULAR LENS IMPLANT Right 6/12/2017    Procedure: CATARACT PHACO EXTRACTION WITH INTRAOCULAR LENS IMPLANT RIGHT ;  Surgeon: Natalie Cao MD;  Location: [facility] OR;  Service:    • CATARACT EXTRACTION W/ INTRAOCULAR LENS IMPLANT Left 7/10/2017    Procedure: CATARACT PHACO EXTRACTION WITH INTRAOCULAR LENS IMPLANT LEFT;  Surgeon: Natalie Cao MD;  Location: [facility] OR;  Service:    • CHOLECYSTECTOMY     • COLONOSCOPY     • CORONARY ANGIOPLASTY WITH STENT PLACEMENT     • CORONARY ANGIOPLASTY WITH STENT PLACEMENT      X1-LAD   • CORONARY ARTERY BYPASS GRAFT N/A 8/15/2017    Procedure: CORONARY ARTERY BYPASS GRAFTING x 3 UTILIZING THE LEFT INTERNAL MAMMARY ARTERY WITH ENDOSCOPIC VEIN HARVESTING OF THE RIGHT GREATER SAPHENOUS VEIN, HAMLET, LAD ENDARECTOMY;  Surgeon: London Damon MD;  Location: [facility] OR;  Service:    • ENDOSCOPY     • EYE SURGERY      cataract surgery both eyes   • KNEE ARTHROSCOPY Bilateral    • NEUROPLASTY / TRANSPOSITION ULNAR NERVE AT ELBOW     • OTHER SURGICAL HISTORY      Foraminotomy and discectomy   • PERICARDIAL WINDOW N/A 8/25/2017    Procedure: PERICARDIAL WINDOW;  Surgeon: Freeman Phillips MD;  Location: [facility] OR;  Service:        Social History     Social History   • Marital status:      Spouse name: N/A   • Number of children: N/A   • Years of education: N/A     Occupational History   • Not on file.     Social History Main Topics   • Smoking status: Former Smoker     Types: Cigarettes     Quit date: 1/1/1998   • Smokeless tobacco: Former User     Types: Snuff     Quit date: 5/24/2017   • Alcohol use Yes      Comment: 3  PER YEAR   • Drug use: No   • Sexual activity: Defer     Other Topics Concern   • Not on file     Social History Narrative    Caffeine use: 0 serving daily.    Patient lives at home with wife.            Family History   Problem Relation Age of Onset   • Hypertension Mother    • Arthritis Mother    • Alcohol abuse Father    • Heart disease Other    • Stroke Other    • Hypertension Other    • Other Other      Neurologic disorder   • Parkinsonism Other    • Stroke Other    • Heart disease Other    • Hypertension Other    • Heart disease Other    • Stroke Other    • Hypertension Other        Review of Systems   Constitution: Positive for fever, malaise/fatigue and weight gain. Negative for chills, decreased appetite, diaphoresis, weakness, night sweats and weight loss.   HENT: Negative for congestion, headaches, hearing loss, hoarse voice and nosebleeds.    Eyes: Positive for blurred vision. Negative for visual disturbance and visual halos.   Cardiovascular: Positive for dyspnea on exertion, leg swelling, orthopnea and palpitations. Negative for chest pain, claudication, cyanosis, irregular heartbeat, near-syncope, paroxysmal nocturnal dyspnea and syncope.   Respiratory: Positive for cough, shortness of breath, snoring and sputum production. Negative for hemoptysis, sleep disturbances due to breathing and wheezing.    Endocrine: Positive for cold intolerance.   Hematologic/Lymphatic: Negative for bleeding problem. Does not bruise/bleed easily.   Skin: Negative for dry skin, itching and rash.   Musculoskeletal: Positive for joint pain. Negative for arthritis, joint swelling and myalgias.   Gastrointestinal: Positive for bloating, constipation and nausea. Negative for abdominal pain, diarrhea, flatus, heartburn, hematemesis, hematochezia, melena and vomiting.   Genitourinary: Negative for dysuria, frequency, hematuria, nocturia and urgency.   Neurological: Negative for excessive daytime sleepiness, dizziness,  "light-headedness and loss of balance.   Psychiatric/Behavioral: Positive for altered mental status and depression. The patient is nervous/anxious. The patient does not have insomnia.        Vitals:    09/08/17 1505 09/08/17 1507 09/08/17 1509   BP: 120/71 124/65 (!) 88/60   BP Location: Right arm Left arm Left arm   Patient Position: Sitting Sitting Standing   Pulse: 79  77   Resp: 16     Temp: 97.8 °F (36.6 °C)     TempSrc: Temporal Artery      SpO2: 98%     Weight: 267 lb (121 kg)     Height: 70\" (177.8 cm)         Objective:     Physical Exam   Constitutional: He is oriented to person, place, and time. He appears well-developed and well-nourished. No distress.   HENT:   Head: Normocephalic.   Eyes: Pupils are equal, round, and reactive to light.   Neck: Neck supple. No JVD present.   Cardiovascular: Normal rate, regular rhythm, normal heart sounds and intact distal pulses.    No murmur heard.  Pulmonary/Chest: Effort normal. He has no wheezes. He has no rales.   Left base diminished but clear   Abdominal: Soft. Bowel sounds are normal. There is no tenderness.   Musculoskeletal: Normal range of motion. He exhibits no edema.   Neurological: He is alert and oriented to person, place, and time. No cranial nerve deficit.   Skin:   Mid-sternal incision, left chest wall, MT sites, and right EVH all well approximated without exudate, tenderness, induration.       Lab Review:      Assessment:           Plan:          Diagnosis Plan   1. Coronary artery disease involving native coronary artery of native heart with unstable angina pectoris  S/P CABG.  ASA, statin, BB.  CTS follow up on 9/21.  Cardiac rehab starting in [place] early October.  Incisions healing w/o s/sx infection.     2. Essential hypertension  Perhaps over treated with diuretic.  Reduce lasix/ potassium to three times per week.  Continue to keep track of BP and daily weights.     3. Post-infarction pericarditis  Checked with Dr. Jiménez.  He recommends " Colchicine 0.6 mg daily x 3 months and indocin x 1 week (which correlates with his prescription at discharge for 14 tabs/ he will complete these then DC).  Check BMP today (re: indocin + diuretics + potassium...).  Will call patient with results.     4. Type 2 DM with long term insulin To see Endocrinology soon.  Spouse to schedule.  Long time patient of Dr. Waters.  Also, see if patient qualifies for continued Nutrition services (dietician).  Referral sent.

## 2017-09-09 LAB
BACTERIA SPEC AEROBE CULT: NORMAL
BACTERIA SPEC AEROBE CULT: NORMAL

## 2017-09-11 ENCOUNTER — TELEPHONE (OUTPATIENT)
Dept: CARDIOLOGY | Facility: HOSPITAL | Age: 57
End: 2017-09-11

## 2017-09-21 ENCOUNTER — OFFICE VISIT (OUTPATIENT)
Dept: CARDIAC SURGERY | Facility: CLINIC | Age: 57
End: 2017-09-21

## 2017-09-21 ENCOUNTER — APPOINTMENT (OUTPATIENT)
Dept: CARDIAC REHAB | Facility: HOSPITAL | Age: 57
End: 2017-09-21

## 2017-09-21 VITALS
WEIGHT: 261 LBS | OXYGEN SATURATION: 98 % | TEMPERATURE: 97.6 F | DIASTOLIC BLOOD PRESSURE: 90 MMHG | SYSTOLIC BLOOD PRESSURE: 150 MMHG | HEIGHT: 70 IN | BODY MASS INDEX: 37.37 KG/M2 | HEART RATE: 83 BPM

## 2017-09-21 DIAGNOSIS — I31.39 PERICARDIAL EFFUSION: Primary | ICD-10-CM

## 2017-09-21 DIAGNOSIS — I25.110 CORONARY ARTERY DISEASE INVOLVING NATIVE CORONARY ARTERY OF NATIVE HEART WITH UNSTABLE ANGINA PECTORIS (HCC): ICD-10-CM

## 2017-09-21 PROCEDURE — 71020 CHG CHEST X-RAY 2 VW: CPT | Performed by: THORACIC SURGERY (CARDIOTHORACIC VASCULAR SURGERY)

## 2017-09-21 PROCEDURE — 99024 POSTOP FOLLOW-UP VISIT: CPT | Performed by: THORACIC SURGERY (CARDIOTHORACIC VASCULAR SURGERY)

## 2017-09-21 NOTE — PROGRESS NOTES
09/21/2017  Patient Information  Denzel Harding                                                                                          [address] [facility] [place] [address]   1960  'PCP/Referring Physician'  Ottoniel Toledo MD  [phone]  No ref. provider found    Chief Complaint   Patient presents with   • Post-op Follow-up     Hosp D/C Pericardial [place] 8/25/17 for Pericardial Window       CC:  I feel very good      History of Present Illness: 57-year-old  male now 1 month postoperative left anterior thoracotomy (pericardial window) and 6 weeks postoperative coronary bypass grafting.  This gentleman has had a benign postoperative course and is currently asymptomatic.  He has returned to full normal activity without difficulty.  He denies anginal quality chest pain he has not had shortness of breath.He denies fever chills and night sweats.    Patient Active Problem List   Diagnosis   • Coronary artery disease involving native coronary artery of native heart with unstable angina pectoris   • Lumbar radiculopathy   • Hypercholesterolemia   • Diabetic polyneuropathy associated with type 2 diabetes mellitus   • Migraine with aura and with status migrainosus   • Palpitations   • Seizure disorder   • GERD (gastroesophageal reflux disease)   • Brachial neuritis   • Cervical radiculopathy   • LOM (loss of memory)   • Anxiety   • Diabetes mellitus   • Lower back pain   • Essential hypertension   • History of CVA (cerebrovascular accident)   • Non morbid obesity due to excess calories   • Post-infarction pericarditis   • HCAP (healthcare-associated pneumonia)     Patient Active Problem List   Diagnosis   • Coronary artery disease involving native coronary artery of native heart with unstable angina pectoris   • Lumbar radiculopathy   • Hypercholesterolemia   • Diabetic polyneuropathy associated with type 2 diabetes mellitus   • Migraine with aura and with status migrainosus   • Palpitations   •  Seizure disorder   • GERD (gastroesophageal reflux disease)   • Brachial neuritis   • Cervical radiculopathy   • LOM (loss of memory)   • Anxiety   • Diabetes mellitus   • Lower back pain   • Essential hypertension   • History of CVA (cerebrovascular accident)   • Non morbid obesity due to excess calories   • Post-infarction pericarditis   • HCAP (healthcare-associated pneumonia)     Past Medical History:   Diagnosis Date   • Anxiety    • Arthritis    • Asthma    • BiPAP (biphasic positive airway pressure) dependence    • Brachial neuritis 1/19/2017   • Chest pain    • COPD (chronic obstructive pulmonary disease)    • Depression    • Diabetes mellitus    • Dizziness    • Edema    • GERD (gastroesophageal reflux disease)    • H/O chest x-ray 07/04/2015    Mild Left base atelectasis   • H/O echocardiogram 07/05/2015    Normal LVSF. EF of 60-65%. Grade 1 diastolic dysfunction of the LV myocardium. No evidence of pericardial effusion   • H/O exercise stress test    • History of herniated intervertebral disc     History of left L5-S1 disc herniation   • LOM (loss of memory) 1/19/2017   • Lower back pain     Right   • Measles    • Obstructive sleep apnea    • Palpitations    • Pericardial effusion    • Seizure disorder    • Shortness of breath 1/19/2017   • SOB (shortness of breath)    • Stroke    • Wears glasses      Past Surgical History:   Procedure Laterality Date   • BACK SURGERY     • CARDIAC CATHETERIZATION     • CARDIAC CATHETERIZATION N/A 8/11/2017    Procedure: Coronary angiography;  Surgeon: Dallas Kim MD;  Location: [facility] INVASIVE LOCATION;  Service:    • CARDIAC CATHETERIZATION N/A 8/11/2017    Procedure: Left Heart Cath;  Surgeon: Dallas Kim MD;  Location: [facility] CATH INVASIVE LOCATION;  Service:    • CARDIAC CATHETERIZATION N/A 8/11/2017    Procedure: Left ventriculography;  Surgeon: Dallas Kim MD;  Location: [facility] INVASIVE LOCATION;  Service:    • CARDIOVASCULAR STRESS TEST   07/03/2017    WITH DR HERNANDEZ AT [facility]   • CARPAL TUNNEL RELEASE Right    • CATARACT EXTRACTION W/ INTRAOCULAR LENS IMPLANT Right 6/12/2017    Procedure: CATARACT PHACO EXTRACTION WITH INTRAOCULAR LENS IMPLANT RIGHT ;  Surgeon: Natalie Cao MD;  Location: [facility] OR;  Service:    • CATARACT EXTRACTION W/ INTRAOCULAR LENS IMPLANT Left 7/10/2017    Procedure: CATARACT PHACO EXTRACTION WITH INTRAOCULAR LENS IMPLANT LEFT;  Surgeon: Natalie Cao MD;  Location:  [facility] OR;  Service:    • CHOLECYSTECTOMY     • COLONOSCOPY     • CORONARY ANGIOPLASTY WITH STENT PLACEMENT     • CORONARY ANGIOPLASTY WITH STENT PLACEMENT      X1-LAD   • CORONARY ARTERY BYPASS GRAFT N/A 8/15/2017    Procedure: CORONARY ARTERY BYPASS GRAFTING x 3 UTILIZING THE LEFT INTERNAL MAMMARY ARTERY WITH ENDOSCOPIC VEIN HARVESTING OF THE RIGHT GREATER SAPHENOUS VEIN, HAMLET, LAD ENDARECTOMY;  Surgeon: London Damon MD;  Location:  [facility] OR;  Service:    • ENDOSCOPY     • EYE SURGERY      cataract surgery both eyes   • KNEE ARTHROSCOPY Bilateral    • NEUROPLASTY / TRANSPOSITION ULNAR NERVE AT ELBOW     • OTHER SURGICAL HISTORY      Foraminotomy and discectomy   • PERICARDIAL WINDOW N/A 8/25/2017    Procedure: PERICARDIAL WINDOW;  Surgeon: Freeman Phillips MD;  Location:  [facility] OR;  Service:        Current Outpatient Prescriptions:   •  albuterol (PROVENTIL HFA;VENTOLIN HFA) 108 (90 BASE) MCG/ACT inhaler, Inhale 2 puffs Every 4 (Four) Hours As Needed. ProAir  (90 Base) MCG/ACT Inhalation Aerosol Solution; Patient Sig: ProAir  (90 Base) MCG/ACT Inhalation Aerosol Solution ; 8; 0; 05-Oct-2015; Active, Disp: , Rfl:   •  amLODIPine (NORVASC) 5 MG tablet, Take 1 tablet by mouth Daily., Disp: 90 tablet, Rfl: 1  •  aspirin  MG EC tablet, Take 1 tablet by mouth Daily., Disp: 30 tablet, Rfl: 2  •  atorvastatin (LIPITOR) 40 MG tablet, Take 1 tablet by mouth Every Night., Disp: 90 tablet, Rfl: 1  •  Cetirizine HCl 10 MG capsule, Take 1 capsule by mouth  daily., Disp: , Rfl:   •  colchicine 0.6 MG tablet, Take 1 tablet by mouth Daily., Disp: 30 tablet, Rfl: 2  •  flunisolide (NASALIDE) 25 MCG/ACT (0.025%) solution nasal spray, Inhale 1 spray Every 12 (Twelve) Hours As Needed (nasal congestion)., Disp: , Rfl:   •  furosemide (LASIX) 40 MG tablet, One tablet on Sundays, Tuesdays, and Thursdays, Disp: 15 tablet, Rfl: 1  •  gabapentin (NEURONTIN) 300 MG capsule, Take 1 capsule by mouth 2 (Two) Times a Day., Disp: 60 capsule, Rfl: 5  •  HUMALOG KWIKPEN 100 UNIT/ML solution pen-injector, Inject 1 dose as directed Take As Directed. Follows [facility]SI From PCP, Disp: , Rfl: 0  •  insulin lispro (humaLOG) 100 UNIT/ML injection, Inject 40 Units under the skin 3 (Three) Times a Day Before Meals., Disp: , Rfl:   •  Insulin Pen Needle 31G X 5 MM misc, , Disp: , Rfl:   •  lisinopril (PRINIVIL,ZESTRIL) 40 MG tablet, Take 1 tablet by mouth daily., Disp: , Rfl:   •  metoprolol tartrate (LOPRESSOR) 50 MG tablet, Take 1 tablet by mouth Every 12 (Twelve) Hours., Disp: 180 tablet, Rfl: 1  •  Misc Natural Products (TUMERSAID) tablet, Take 1 tablet by mouth Daily., Disp: , Rfl:   •  montelukast (SINGULAIR) 10 MG tablet, take 1 tablet by mouth at bedtime, Disp: 30 tablet, Rfl: 2  •  omeprazole (PriLOSEC) 20 MG capsule, Take 1 capsule by mouth daily., Disp: , Rfl:   •  potassium chloride (K-DUR,KLOR-CON) 20 MEQ CR tablet, One tablet on Sundays, Tuesdays, and Thursdays, Disp: 15 tablet, Rfl: 1  •  ranolazine (RANEXA) 500 MG 12 hr tablet, Take 1 tablet by mouth 2 (Two) Times a Day., Disp: 60 tablet, Rfl: 11  •  topiramate (TOPAMAX) 100 MG tablet, Take 100 mg by mouth Daily., Disp: , Rfl:   •  TOUJEO SOLOSTAR 300 UNIT/ML solution pen-injector, Inject 150 Units as directed Every Night., Disp: , Rfl: 0  •  Vortioxetine HBr (TRINTELLIX) 10 MG tablet, Take 10 mg by mouth Daily., Disp: , Rfl:   •  HYDROcodone-acetaminophen (NORCO) 7.5-325 MG per tablet, Take 1 tablet by mouth Every 6 (Six) Hours As Needed  for Moderate Pain ., Disp: 30 tablet, Rfl: 0  Allergies   Allergen Reactions   • Sulfa Antibiotics Anaphylaxis, Nausea And Vomiting and Delirium   • Codeine Nausea And Vomiting     Can only take with Phenergan   • Invokana [Canagliflozin]      DKA   • Morphine      Does not work. It causes pain     Social History     Social History   • Marital status:      Spouse name: N/A   • Number of children: 1   • Years of education: N/A     Occupational History   • [employer]      Disabled since 2002-Back Problems     Social History Main Topics   • Smoking status: Former Smoker     Packs/day: 1.00     Years: 10.00     Types: Cigarettes     Quit date: 1/1/1998   • Smokeless tobacco: Former User     Types: Snuff     Quit date: 5/24/2017   • Alcohol use Yes      Comment: 3 PER YEAR   • Drug use: No   • Sexual activity: Defer     Other Topics Concern   • Not on file     Social History Narrative    Caffeine use: 0 serving daily.    Patient lives at home with wife.          Family History   Problem Relation Age of Onset   • Hypertension Mother    • Arthritis Mother    • Alcohol abuse Father    • Heart disease Other    • Stroke Other    • Hypertension Other    • Other Other      Neurologic disorder   • Parkinsonism Other    • Stroke Other    • Heart disease Other    • Hypertension Other    • Heart disease Other    • Stroke Other    • Hypertension Other      Review of Systems   Constitution: Negative for chills, decreased appetite, fever, weakness, malaise/fatigue, weight gain and weight loss.   HENT: Negative for congestion and sore throat.    Eyes: Negative for blurred vision and visual disturbance.   Cardiovascular: Negative for chest pain, claudication, dyspnea on exertion, leg swelling, near-syncope, palpitations and syncope.   Respiratory: Negative for cough, hemoptysis, shortness of breath and wheezing.    Hematologic/Lymphatic: Negative for adenopathy. Does not bruise/bleed easily.   Skin: Negative  "for rash.   Musculoskeletal: Negative for arthritis, joint pain and muscle weakness.   Gastrointestinal: Negative for abdominal pain, anorexia, constipation, diarrhea, jaundice, melena, nausea and vomiting.   Genitourinary: Negative for dysuria, frequency and hematuria.   Neurological: Negative for focal weakness, headaches, light-headedness, numbness and paresthesias.   Psychiatric/Behavioral: Negative for altered mental status.     Vitals:    09/21/17 1005   BP: 150/90   BP Location: Left arm   Patient Position: Sitting   Pulse: 83   Temp: 97.6 °F (36.4 °C)   SpO2: 98%   Weight: 261 lb (118 kg)   Height: 70\" (177.8 cm)      Physical Exam   Constitutional: He is oriented to person, place, and time. He appears well-developed and well-nourished. No distress.   HENT:   Head: Normocephalic.   Right Ear: External ear normal.   Left Ear: External ear normal.   Nose: Nose normal.   Eyes: Pupils are equal, round, and reactive to light.   Neck: Normal range of motion. Neck supple. No tracheal deviation present. No thyromegaly present.   Cardiovascular: Normal rate, normal heart sounds and intact distal pulses.    No murmur heard.  Pulmonary/Chest: Effort normal and breath sounds normal. No respiratory distress. He has no wheezes. He has no rales. He exhibits no tenderness.   Left anterior chest incision healing without erythema and without drainage.  Sternotomy healing without problems.  Sternum is stable.   Abdominal: Soft. Bowel sounds are normal. He exhibits no distension and no mass. There is no tenderness.   Musculoskeletal: Normal range of motion. He exhibits no edema.   Lymphadenopathy:     He has no cervical adenopathy.   Neurological: He is alert and oriented to person, place, and time. He has normal reflexes. No cranial nerve deficit.   Skin: Skin is warm and dry. No rash noted. No erythema.   Psychiatric: He has a normal mood and affect.       Labs/Imaging: PA and lateral chest x-ray reviewed.  Small left pleural " effusion present.  Mediastinum is normal lung fields are otherwise clear.    Assessment/Plan:  Stable postoperative course return to clinic  3 months          Signed by:        Freeman Phillips MD  [facility]  09/24/17   4:53 PM

## 2017-09-24 ENCOUNTER — HOSPITAL ENCOUNTER (EMERGENCY)
Facility: HOSPITAL | Age: 57
Discharge: HOME | End: 2017-09-24
Attending: EMERGENCY MEDICINE | Admitting: EMERGENCY MEDICINE

## 2017-09-24 ENCOUNTER — APPOINTMENT (OUTPATIENT)
Dept: CT IMAGING | Facility: HOSPITAL | Age: 57
End: 2017-09-24

## 2017-09-24 VITALS
RESPIRATION RATE: 16 BRPM | HEIGHT: 70 IN | HEART RATE: 84 BPM | DIASTOLIC BLOOD PRESSURE: 75 MMHG | SYSTOLIC BLOOD PRESSURE: 117 MMHG | OXYGEN SATURATION: 96 % | TEMPERATURE: 98.2 F | BODY MASS INDEX: 36.08 KG/M2 | WEIGHT: 252 LBS

## 2017-09-24 DIAGNOSIS — I31.39 PERICARDIAL EFFUSION: ICD-10-CM

## 2017-09-24 DIAGNOSIS — R73.09 ELEVATED GLUCOSE: ICD-10-CM

## 2017-09-24 DIAGNOSIS — J90 BILATERAL PLEURAL EFFUSION: ICD-10-CM

## 2017-09-24 DIAGNOSIS — T81.31XA: Primary | ICD-10-CM

## 2017-09-24 LAB
ALBUMIN SERPL-MCNC: 3.8 G/DL (ref 3.2–4.8)
ALBUMIN/GLOB SERPL: 1.5 G/DL (ref 1.5–2.5)
ALP SERPL-CCNC: 96 U/L (ref 25–100)
ALT SERPL W P-5'-P-CCNC: 18 U/L (ref 7–40)
ANION GAP SERPL CALCULATED.3IONS-SCNC: 5 MMOL/L (ref 3–11)
AST SERPL-CCNC: 15 U/L (ref 0–33)
BASOPHILS # BLD AUTO: 0.04 10*3/MM3 (ref 0–0.2)
BASOPHILS NFR BLD AUTO: 0.7 % (ref 0–1)
BILIRUB SERPL-MCNC: 0.8 MG/DL (ref 0.3–1.2)
BUN BLD-MCNC: 11 MG/DL (ref 9–23)
BUN/CREAT SERPL: 15.7 (ref 7–25)
CALCIUM SPEC-SCNC: 9 MG/DL (ref 8.7–10.4)
CHLORIDE SERPL-SCNC: 109 MMOL/L (ref 99–109)
CO2 SERPL-SCNC: 25 MMOL/L (ref 20–31)
CREAT BLD-MCNC: 0.7 MG/DL (ref 0.6–1.3)
DEPRECATED RDW RBC AUTO: 40.8 FL (ref 37–54)
EOSINOPHIL # BLD AUTO: 0.64 10*3/MM3 (ref 0–0.3)
EOSINOPHIL NFR BLD AUTO: 10.8 % (ref 0–3)
ERYTHROCYTE [DISTWIDTH] IN BLOOD BY AUTOMATED COUNT: 14.1 % (ref 11.3–14.5)
GFR SERPL CREATININE-BSD FRML MDRD: 117 ML/MIN/1.73
GLOBULIN UR ELPH-MCNC: 2.6 GM/DL
GLUCOSE BLD-MCNC: 171 MG/DL (ref 70–100)
HCT VFR BLD AUTO: 34.2 % (ref 38.9–50.9)
HGB BLD-MCNC: 10.9 G/DL (ref 13.1–17.5)
IMM GRANULOCYTES # BLD: 0.01 10*3/MM3 (ref 0–0.03)
IMM GRANULOCYTES NFR BLD: 0.2 % (ref 0–0.6)
LYMPHOCYTES # BLD AUTO: 1.67 10*3/MM3 (ref 0.6–4.8)
LYMPHOCYTES NFR BLD AUTO: 28.2 % (ref 24–44)
MCH RBC QN AUTO: 25.3 PG (ref 27–31)
MCHC RBC AUTO-ENTMCNC: 31.9 G/DL (ref 32–36)
MCV RBC AUTO: 79.4 FL (ref 80–99)
MONOCYTES # BLD AUTO: 0.39 10*3/MM3 (ref 0–1)
MONOCYTES NFR BLD AUTO: 6.6 % (ref 0–12)
NEUTROPHILS # BLD AUTO: 3.17 10*3/MM3 (ref 1.5–8.3)
NEUTROPHILS NFR BLD AUTO: 53.5 % (ref 41–71)
PLATELET # BLD AUTO: 231 10*3/MM3 (ref 150–450)
PMV BLD AUTO: 9.5 FL (ref 6–12)
POTASSIUM BLD-SCNC: 3.6 MMOL/L (ref 3.5–5.5)
PROT SERPL-MCNC: 6.4 G/DL (ref 5.7–8.2)
RBC # BLD AUTO: 4.31 10*6/MM3 (ref 4.2–5.76)
SODIUM BLD-SCNC: 139 MMOL/L (ref 132–146)
WBC NRBC COR # BLD: 5.92 10*3/MM3 (ref 3.5–10.8)

## 2017-09-24 PROCEDURE — 80053 COMPREHEN METABOLIC PANEL: CPT | Performed by: EMERGENCY MEDICINE

## 2017-09-24 PROCEDURE — 99283 EMERGENCY DEPT VISIT LOW MDM: CPT

## 2017-09-24 PROCEDURE — 87186 SC STD MICRODIL/AGAR DIL: CPT | Performed by: EMERGENCY MEDICINE

## 2017-09-24 PROCEDURE — 87205 SMEAR GRAM STAIN: CPT | Performed by: EMERGENCY MEDICINE

## 2017-09-24 PROCEDURE — 71250 CT THORAX DX C-: CPT

## 2017-09-24 PROCEDURE — 87070 CULTURE OTHR SPECIMN AEROBIC: CPT | Performed by: EMERGENCY MEDICINE

## 2017-09-24 PROCEDURE — 85025 COMPLETE CBC W/AUTO DIFF WBC: CPT | Performed by: EMERGENCY MEDICINE

## 2017-09-24 PROCEDURE — 87077 CULTURE AEROBIC IDENTIFY: CPT | Performed by: EMERGENCY MEDICINE

## 2017-09-24 PROCEDURE — 87147 CULTURE TYPE IMMUNOLOGIC: CPT | Performed by: EMERGENCY MEDICINE

## 2017-09-24 RX ORDER — CEPHALEXIN 500 MG/1
500 CAPSULE ORAL 2 TIMES DAILY
Qty: 14 CAPSULE | Refills: 0 | Status: SHIPPED | OUTPATIENT
Start: 2017-09-24 | End: 2017-10-01

## 2017-09-24 RX ORDER — CEPHALEXIN 250 MG/1
500 CAPSULE ORAL ONCE
Status: COMPLETED | OUTPATIENT
Start: 2017-09-24 | End: 2017-09-24

## 2017-09-24 RX ADMIN — CEPHALEXIN 500 MG: 250 CAPSULE ORAL at 13:50

## 2017-09-24 NOTE — ED PROVIDER NOTES
Subjective   HPI Comments: Denzel Harding is a 57 y.o. male who presents to the ED with c/o wound infection. The patient had a CABG done on 08/15/17. The chest tubes were pulled to early however and so he had to be incised again on 08/23/17 under his left breast and fluid was drained. Last night, the patient states that he felt discomfort in the area which he describes as a bulging sensation and noticed drainage. He denies any fever, chills, sore throat, cough, or any other complaints at this time.    Patient is a 57 y.o. male presenting with wound check.   History provided by:  Patient  Wound Check   Location:  Left chest   Severity:  Unable to specify  Onset quality:  Sudden  Duration:  1 day  Timing:  Constant  Progression:  Worsening  Chronicity:  New  Context:   The patient had a CABG done on 08/15/17. The chest tubes were pulled to early however and so he had to be incised again on 08/23/17 under his left breast and fluid was drained. Last night, the patient states that he felt discomfort in the area which he describes as a bulging sensation and noticed drainage.   Relieved by:  None tried  Worsened by:  Nothing  Ineffective treatments:  None tried  Associated symptoms: chest pain (Discomfort)    Associated symptoms: no cough, no fever, no rhinorrhea and no sore throat        Review of Systems   Constitutional: Negative for chills and fever.   HENT: Negative for rhinorrhea and sore throat.    Respiratory: Negative for cough.    Cardiovascular: Positive for chest pain (Discomfort).   Skin: Positive for wound.   All other systems reviewed and are negative.            Past Medical History:   Diagnosis Date   • Anxiety    • Arthritis    • Asthma    • BiPAP (biphasic positive airway pressure) dependence    • Brachial neuritis 1/19/2017   • Chest pain    • COPD (chronic obstructive pulmonary disease)    • Depression    • Diabetes mellitus    • Dizziness    • Edema    • GERD (gastroesophageal reflux disease)    •  H/O chest x-ray 07/04/2015    Mild Left base atelectasis   • H/O echocardiogram 07/05/2015    Normal LVSF. EF of 60-65%. Grade 1 diastolic dysfunction of the LV myocardium. No evidence of pericardial effusion   • H/O exercise stress test    • History of herniated intervertebral disc     History of left L5-S1 disc herniation   • LOM (loss of memory) 1/19/2017   • Lower back pain     Right   • Measles    • Obstructive sleep apnea    • Palpitations    • Pericardial effusion    • Seizure disorder    • Shortness of breath 1/19/2017   • SOB (shortness of breath)    • Stroke    • Wears glasses        Allergies   Allergen Reactions   • Sulfa Antibiotics Anaphylaxis, Nausea And Vomiting and Delirium   • Codeine Nausea And Vomiting     Can only take with Phenergan   • Invokana [Canagliflozin]      DKA   • Morphine      Does not work. It causes pain       Past Surgical History:   Procedure Laterality Date   • BACK SURGERY     • CARDIAC CATHETERIZATION     • CARDIAC CATHETERIZATION N/A 8/11/2017    Procedure: Coronary angiography;  Surgeon: Dallas Kim MD;  Location: [facility] INVASIVE LOCATION;  Service:    • CARDIAC CATHETERIZATION N/A 8/11/2017    Procedure: Left Heart Cath;  Surgeon: Dallas Kim MD;  Location: [facility] CATH INVASIVE LOCATION;  Service:    • CARDIAC CATHETERIZATION N/A 8/11/2017    Procedure: Left ventriculography;  Surgeon: Dallas Kim MD;  Location: [facility] INVASIVE LOCATION;  Service:    • CARDIOVASCULAR STRESS TEST  07/03/2017    WITH DR KIM AT [facility]   • CARPAL TUNNEL RELEASE Right    • CATARACT EXTRACTION W/ INTRAOCULAR LENS IMPLANT Right 6/12/2017    Procedure: CATARACT PHACO EXTRACTION WITH INTRAOCULAR LENS IMPLANT RIGHT ;  Surgeon: Natalie Cao MD;  Location: [facility];  Service:    • CATARACT EXTRACTION W/ INTRAOCULAR LENS IMPLANT Left 7/10/2017    Procedure: CATARACT PHACO EXTRACTION WITH INTRAOCULAR LENS IMPLANT LEFT;  Surgeon: Natalie Cao MD;  Location: [facility];   Service:    • CHOLECYSTECTOMY     • COLONOSCOPY     • CORONARY ANGIOPLASTY WITH STENT PLACEMENT     • CORONARY ANGIOPLASTY WITH STENT PLACEMENT      X1-LAD   • CORONARY ARTERY BYPASS GRAFT N/A 8/15/2017    Procedure: CORONARY ARTERY BYPASS GRAFTING x 3 UTILIZING THE LEFT INTERNAL MAMMARY ARTERY WITH ENDOSCOPIC VEIN HARVESTING OF THE RIGHT GREATER SAPHENOUS VEIN, HAMLET, LAD ENDARECTOMY;  Surgeon: London Damon MD;  Location: [facility] OR;  Service:    • ENDOSCOPY     • EYE SURGERY      cataract surgery both eyes   • KNEE ARTHROSCOPY Bilateral    • NEUROPLASTY / TRANSPOSITION ULNAR NERVE AT ELBOW     • OTHER SURGICAL HISTORY      Foraminotomy and discectomy   • PERICARDIAL WINDOW N/A 8/25/2017    Procedure: PERICARDIAL WINDOW;  Surgeon: Freeman Phillips MD;  Location:  [facility] OR;  Service:        Family History   Problem Relation Age of Onset   • Hypertension Mother    • Arthritis Mother    • Alcohol abuse Father    • Heart disease Other    • Stroke Other    • Hypertension Other    • Other Other      Neurologic disorder   • Parkinsonism Other    • Stroke Other    • Heart disease Other    • Hypertension Other    • Heart disease Other    • Stroke Other    • Hypertension Other        Social History     Social History   • Marital status:      Spouse name: N/A   • Number of children: 1   • Years of education: N/A     Occupational History   • [employer]      Disabled since 2002-Back Problems     Social History Main Topics   • Smoking status: Former Smoker     Packs/day: 1.00     Years: 10.00     Types: Cigarettes     Quit date: 1/1/1998   • Smokeless tobacco: Former User     Types: Snuff     Quit date: 5/24/2017   • Alcohol use Yes      Comment: 3 PER YEAR   • Drug use: No   • Sexual activity: Defer     Other Topics Concern   • None     Social History Narrative    Caffeine use: 0 serving daily.    Patient lives at home with wife.            Progress Notes  Encounter Date: 9/8/2017  ANTONIO Bray    Cardiology          Diagnosis Plan   1. Coronary artery disease involving native coronary artery of native heart with unstable angina pectoris  S/P CABG.  ASA, statin, BB.  CTS follow up on 9/21.  Cardiac rehab starting in [place] early October.  Incisions healing w/o s/sx infection.   2. Essential hypertension  Perhaps over treated with diuretic.  Reduce lasix/ potassium to three times per week.  Continue to keep track of BP and daily weights.   3. Post-infarction pericarditis  Checked with Dr. Jiménez.  He recommends Colchicine 0.6 mg daily x 3 months and indocin x 1 week (which correlates with his prescription at discharge for 14 tabs/ he will complete these then DC).  Check BMP today (re: indocin + diuretics + potassium...).  Will call patient with results.   4. Type 2 DM with long term insulin To see Endocrinology soon.  Spouse to schedule.  Long time patient of Dr. Waters.  Also, see if patient qualifies for continued Nutrition services (dietician).  Referral sent.                  Discharge Summaries  Date of Service: 9/1/2017 8:02 AM  Viviana Luciano PA-C   Cardiothoracic Surgery   Cosigned by: London Damon MD at 9/10/2017  8:38 AM     Date of Admission: 8/11/2017 12:03 PM  Date of Discharge:  9/5/17     Discharge Diagnosis   Medical History         Past Medical History:   Diagnosis Date   • Anxiety     • Arthritis     • Asthma     • BiPAP (biphasic positive airway pressure) dependence     • Brachial neuritis 1/19/2017   • Chest pain     • COPD (chronic obstructive pulmonary disease)     • Depression     • Diabetes mellitus     • Dizziness     • Edema     • GERD (gastroesophageal reflux disease)     • H/O chest x-ray 07/04/2015     Mild Left base atelectasis   • H/O echocardiogram 07/05/2015     Normal LVSF. EF of 60-65%. Grade 1 diastolic dysfunction of the LV myocardium. No evidence of pericardial effusion   • H/O exercise stress test     • History of herniated intervertebral disc       History of left  L5-S1 disc herniation   • LOM (loss of memory) 1/19/2017   • Lower back pain       Right   • Measles     • Obstructive sleep apnea     • Palpitations     • Seizure disorder     • Shortness of breath 1/19/2017   • SOB (shortness of breath)     • Stroke     • Wears glasses              Active Problems:    Hypercholesterolemia    Diabetic polyneuropathy associated with type 2 diabetes mellitus    Seizure disorder    GERD (gastroesophageal reflux disease)    Essential hypertension    History of CVA (cerebrovascular accident)    Non morbid obesity due to excess calories    Post-infarction pericarditis    HCAP (healthcare-associated pneumonia)        History of Present Illness  57 year old  male with a history of hypertension, poorly controlled diabetes, stroke, remote tobacco abuse, COPD, obstructive sleep apnea and known coronary artery disease status post stenting, who presents with chest pain.  For the past 3 months, he has noticed swelling in the bilateral lower extremities.  This was then accompanied by a dull chest pressure radiating to the right jaw and left arm.  The pain had no significant aggravating or alleviating factors.  He also notes some associated nausea, exertional dyspnea and fatigue.     Hospital Course  Patient is a 57 y.o. male admitted from OSH 8/11/17 where LHC revealed severe 3vd. He underwent preop testing and on 8/15/17 underwent CABG x 3 without complications. Transported to the ICU, hemodynamically stable. Ventilator was weaned per protocol, and was extubated later that evening.   POD 1 Up in chair, on low-dose levo gtt. Poor expansion on CXR.   POD 2 Wires removed, transferred to [facility]  POD 3 Large chest tube output, continued recovery  POD 4 Slow recovery  POD 5 Continued large chest tube output, one chest tube removed.   POD 6 Continuous chest tube drainage from remaining 2 chest tubes, Indocin started for possible pericarditis  POD 7 Continued recovery  POD 8 IV out, unable to  replace. Continued large chest tube output.   POD 9 Chest tubes removed, slow improvements  POD 10 Not feeling well, SOB. Echo ordered which revealed large pericardial effusion. Later that morning, underwent pericardial window.   POD 11 On Indocin and colchicine for suspected pericarditis, diuresis.   POD 12 Transferred to [facility]  POD 13 Doing well, continued chest tube mgnt.   POD 14 Slow improvements  POD 15 Chest tubes removed, slow improvements.   POD 16 Diuresis.  Positive respiratory cultures and was placed on IV antibiotics.  POD 17 Blood cultures pending.  POD 18  Positive blood culture but suspected contaminant.  Awaiting final culture.  BP meds increased.  POD 19 Awaiting final blood culture results.  Cough improved.  POD 20 Blood cultures negative at 24 hours.  Transitioned to PO abx.  Patient discharged to home.        Procedures Performed  8/15/17  CABG x 3, LAD endarderectomy  8/25/17   PERICARDIAL WINDOW           Objective   Physical Exam   Constitutional: He is oriented to person, place, and time. He appears well-developed and well-nourished. No distress.   HENT:   Head: Normocephalic and atraumatic.   Nose: Nose normal.   Eyes: Conjunctivae are normal. No scleral icterus.   Neck: Normal range of motion. Neck supple.   Cardiovascular: Normal rate, regular rhythm, normal heart sounds and intact distal pulses.    No murmur heard.  Pulmonary/Chest: Effort normal and breath sounds normal. No respiratory distress.   Midline sternotomy incision well healed, no redness. Areas where pacing wire had come out had no scabs, no redness or evidence of infection. 2 areas where left chest tubes were are healing with no discharge. Area under his left breast and nipple where the pericardial window was placed showed and area of dehiscence of about 4 cm, but did not appear overtyl infeced with no signs of cellulitis.  The wound was carefully palpated and no pus was expressed.   Abdominal: Soft. There is no tenderness.    Musculoskeletal: Normal range of motion. He exhibits no edema.   Neurological: He is alert and oriented to person, place, and time. He has normal strength and normal reflexes. No sensory deficit.   Face symmetric, voice strong, tongue midline; Vision, hearing and speech all preserved.   Skin: Skin is warm and dry.   Psychiatric: He has a normal mood and affect. His behavior is normal.   Nursing note and vitals reviewed.      Procedures         ED Course  ED Course   Comment By Time   Dr. Carmona is at the bedside re evaluating the patient. Schuyler HOWE Friend 09/24 1215     Recent Results (from the past 24 hour(s))   Comprehensive Metabolic Panel    Collection Time: 09/24/17 10:51 AM   Result Value Ref Range    Glucose 171 (H) 70 - 100 mg/dL    BUN 11 9 - 23 mg/dL    Creatinine 0.70 0.60 - 1.30 mg/dL    Sodium 139 132 - 146 mmol/L    Potassium 3.6 3.5 - 5.5 mmol/L    Chloride 109 99 - 109 mmol/L    CO2 25.0 20.0 - 31.0 mmol/L    Calcium 9.0 8.7 - 10.4 mg/dL    Total Protein 6.4 5.7 - 8.2 g/dL    Albumin 3.80 3.20 - 4.80 g/dL    ALT (SGPT) 18 7 - 40 U/L    AST (SGOT) 15 0 - 33 U/L    Alkaline Phosphatase 96 25 - 100 U/L    Total Bilirubin 0.8 0.3 - 1.2 mg/dL    eGFR Non African Amer 117 >60 mL/min/1.73    Globulin 2.6 gm/dL    A/G Ratio 1.5 1.5 - 2.5 g/dL    BUN/Creatinine Ratio 15.7 7.0 - 25.0    Anion Gap 5.0 3.0 - 11.0 mmol/L   CBC Auto Differential    Collection Time: 09/24/17 10:51 AM   Result Value Ref Range    WBC 5.92 3.50 - 10.80 10*3/mm3    RBC 4.31 4.20 - 5.76 10*6/mm3    Hemoglobin 10.9 (L) 13.1 - 17.5 g/dL    Hematocrit 34.2 (L) 38.9 - 50.9 %    MCV 79.4 (L) 80.0 - 99.0 fL    MCH 25.3 (L) 27.0 - 31.0 pg    MCHC 31.9 (L) 32.0 - 36.0 g/dL    RDW 14.1 11.3 - 14.5 %    RDW-SD 40.8 37.0 - 54.0 fl    MPV 9.5 6.0 - 12.0 fL    Platelets 231 150 - 450 10*3/mm3    Neutrophil % 53.5 41.0 - 71.0 %    Lymphocyte % 28.2 24.0 - 44.0 %    Monocyte % 6.6 0.0 - 12.0 %    Eosinophil % 10.8 (H) 0.0 - 3.0 %    Basophil % 0.7 0.0  - 1.0 %    Immature Grans % 0.2 0.0 - 0.6 %    Neutrophils, Absolute 3.17 1.50 - 8.30 10*3/mm3    Lymphocytes, Absolute 1.67 0.60 - 4.80 10*3/mm3    Monocytes, Absolute 0.39 0.00 - 1.00 10*3/mm3    Eosinophils, Absolute 0.64 (H) 0.00 - 0.30 10*3/mm3    Basophils, Absolute 0.04 0.00 - 0.20 10*3/mm3    Immature Grans, Absolute 0.01 0.00 - 0.03 10*3/mm3     Note: In addition to lab results from this visit, the labs listed above may include labs taken at another facility or during a different encounter within the last 24 hours. Please correlate lab times with ED admission and discharge times for further clarification of the services performed during this visit.    CT Chest Without Contrast   Preliminary Result   1. Trace remaining pericardial effusion, and mild mediastinal fat   stranding not unusual for recent surgery.   2. Small bibasilar pleural effusions, stable on the left, mildly   increased on the right since prior scan.   3. Left anterior chest subcutaneous fat stranding, potentially a    cellulitis but no evidence of abscess.       DICTATED:     09/24/2017   EDITED:         09/24/2017            Vitals:    09/24/17 0958 09/24/17 1100 09/24/17 1307 09/24/17 1318   BP:  123/67 138/71    BP Location:       Patient Position:       Pulse: 78   78   Resp:       Temp:       TempSrc:       SpO2:  96% 94%    Weight:       Height:         Medications   cephalexin (KEFLEX) capsule 500 mg (not administered)     ECG/EMG Results (last 24 hours)     ** No results found for the last 24 hours. **                        MDM  Number of Diagnoses or Management Options  Bilateral pleural effusion:   Dehiscence of closure of subcutaneous tissue, initial encounter:   Elevated glucose:   Pericardial effusion:   Diagnosis management comments:       I reviewed all available studies at the bedside with the patient and his wife.  They are reassuring.  He has a small pleural effusion bilaterally that is residual from his recent surgery and  he still has a small pericardial effusion hopefully time.  There is no evidence of abscess cavity at his area of wound dehiscent's.  This area was carefully probed with a sterile Q-tip and the fascia seem to be intact.  Really go ahead and start him on some Keflex.  I did a wound culture the very well may be come back positive but should be viewed in the light that this was from the skin and not from an abscess cavity.    I spoke Dr. Narayan, the patient's surgeon, and he will see the patient in follow-up in one to 2 weeks.  He would like patient to wash the wound off in the shower and patted dry and use a dry dressing on it.  He'll return to the ER if worse in any way.  All are agreeable with the plan       Amount and/or Complexity of Data Reviewed  Clinical lab tests: reviewed  Tests in the radiology section of CPT®: reviewed        Final diagnoses:   Dehiscence of closure of subcutaneous tissue, initial encounter   Elevated glucose   Pericardial effusion   Bilateral pleural effusion     EMR Dragon/Transcription disclaimer:  Much of this encounter note is an electronic transcription/translation of spoken language to printed text. The electronic translation of spoken language may permit erroneous, or at times, nonsensical words or phrases to be inadvertently transcribed; Although I have reviewed the note for such errors, some may still exist.    Documentation assistance provided by wen Friend.  Information recorded by the wen was done at my direction and has been verified and validated by me.     Schuyler Friend  09/24/17 1230       Schuyler Friend  09/24/17 1346       Nasir Carmona MD  09/26/17 6552

## 2017-09-27 ENCOUNTER — HOSPITAL ENCOUNTER (OUTPATIENT)
Dept: NUTRITION | Facility: HOSPITAL | Age: 57
Setting detail: RECURRING SERIES
Discharge: HOME | End: 2017-09-27

## 2017-09-27 LAB
BACTERIA SPEC AEROBE CULT: ABNORMAL
BACTERIA SPEC AEROBE CULT: ABNORMAL
GRAM STN SPEC: ABNORMAL

## 2017-09-28 ENCOUNTER — OFFICE VISIT (OUTPATIENT)
Dept: CARDIAC SURGERY | Facility: CLINIC | Age: 57
End: 2017-09-28

## 2017-09-28 VITALS
WEIGHT: 262.6 LBS | TEMPERATURE: 96.9 F | HEIGHT: 70 IN | SYSTOLIC BLOOD PRESSURE: 129 MMHG | HEART RATE: 81 BPM | DIASTOLIC BLOOD PRESSURE: 85 MMHG | BODY MASS INDEX: 37.59 KG/M2 | OXYGEN SATURATION: 98 %

## 2017-09-28 DIAGNOSIS — I31.39 PERICARDIAL EFFUSION: Primary | ICD-10-CM

## 2017-09-28 PROCEDURE — 99024 POSTOP FOLLOW-UP VISIT: CPT | Performed by: THORACIC SURGERY (CARDIOTHORACIC VASCULAR SURGERY)

## 2017-09-28 NOTE — PROGRESS NOTES
09/28/2017  Patient Information  Denzel Harding                                                                                          [address] [facility] [place] [address]   1960  'PCP/Referring Physician'  Ottoniel Toledo MD  [phone]  No ref. provider found    Chief Complaint   Patient presents with   • Follow-up     Patient is here today for incision check. He states that the incision was fine at his last visit on 9/21 and on 9/23 it had gotten worse   CC: My wound broke open    History of Present Illness:57-year-old  male 1 month postoperative left anterior thoracotomy (pericardial window) and 7 weeks postoperative coronary artery bypass grafting.  This man has done well.  He recently saw his cardiologist and echocardiogram and the cardiologist noted that the midportion of the left anterior thoracotomy wound had  some he advised the patient to return to this office for a recheck.  The patient is asymptomatic.  He has not had fever, chills, or night sweats.  There is been no significant drainage from the wound is not particularly tender.  He is taking Keflex but is not utilizing any local treatments for wound care.      Patient Active Problem List   Diagnosis   • Coronary artery disease involving native coronary artery of native heart with unstable angina pectoris   • Lumbar radiculopathy   • Hypercholesterolemia   • Diabetic polyneuropathy associated with type 2 diabetes mellitus   • Migraine with aura and with status migrainosus   • Palpitations   • Seizure disorder   • GERD (gastroesophageal reflux disease)   • Brachial neuritis   • Cervical radiculopathy   • LOM (loss of memory)   • Anxiety   • Diabetes mellitus   • Lower back pain   • Essential hypertension   • History of CVA (cerebrovascular accident)   • Non morbid obesity due to excess calories   • Post-infarction pericarditis   • HCAP (healthcare-associated pneumonia)     Past Medical History:   Diagnosis Date   •  Anxiety    • Arthritis    • Asthma    • BiPAP (biphasic positive airway pressure) dependence    • Brachial neuritis 1/19/2017   • Chest pain    • COPD (chronic obstructive pulmonary disease)    • Depression    • Diabetes mellitus    • Dizziness    • Edema    • GERD (gastroesophageal reflux disease)    • H/O chest x-ray 07/04/2015    Mild Left base atelectasis   • H/O echocardiogram 07/05/2015    Normal LVSF. EF of 60-65%. Grade 1 diastolic dysfunction of the LV myocardium. No evidence of pericardial effusion   • H/O exercise stress test    • History of herniated intervertebral disc     History of left L5-S1 disc herniation   • LOM (loss of memory) 1/19/2017   • Lower back pain     Right   • Measles    • Obstructive sleep apnea    • Palpitations    • Pericardial effusion    • Seizure disorder    • Shortness of breath 1/19/2017   • SOB (shortness of breath)    • Stroke    • Wears glasses      Past Surgical History:   Procedure Laterality Date   • BACK SURGERY     • CARDIAC CATHETERIZATION     • CARDIAC CATHETERIZATION N/A 8/11/2017    Procedure: Coronary angiography;  Surgeon: Dallas Kim MD;  Location: [facility] INVASIVE LOCATION;  Service:    • CARDIAC CATHETERIZATION N/A 8/11/2017    Procedure: Left Heart Cath;  Surgeon: Dallas Kim MD;  Location: [facility] INVASIVE LOCATION;  Service:    • CARDIAC CATHETERIZATION N/A 8/11/2017    Procedure: Left ventriculography;  Surgeon: Dallas Kim MD;  Location: [facility] INVASIVE LOCATION;  Service:    • CARDIOVASCULAR STRESS TEST  07/03/2017    WITH DR KIM AT [facility]   • CARPAL TUNNEL RELEASE Right    • CATARACT EXTRACTION W/ INTRAOCULAR LENS IMPLANT Right 6/12/2017    Procedure: CATARACT PHACO EXTRACTION WITH INTRAOCULAR LENS IMPLANT RIGHT ;  Surgeon: Natalie Cao MD;  Location: [facility];  Service:    • CATARACT EXTRACTION W/ INTRAOCULAR LENS IMPLANT Left 7/10/2017    Procedure: CATARACT PHACO EXTRACTION WITH INTRAOCULAR LENS IMPLANT LEFT;   Surgeon: Natalie Cao MD;  Location:  [facility];  Service:    • CHOLECYSTECTOMY     • COLONOSCOPY     • CORONARY ANGIOPLASTY WITH STENT PLACEMENT     • CORONARY ANGIOPLASTY WITH STENT PLACEMENT      X1-LAD   • CORONARY ARTERY BYPASS GRAFT N/A 8/15/2017    Procedure: CORONARY ARTERY BYPASS GRAFTING x 3 UTILIZING THE LEFT INTERNAL MAMMARY ARTERY WITH ENDOSCOPIC VEIN HARVESTING OF THE RIGHT GREATER SAPHENOUS VEIN, HAMLET, LAD ENDARECTOMY;  Surgeon: London Damon MD;  Location:  [facility];  Service:    • ENDOSCOPY     • EYE SURGERY      cataract surgery both eyes   • KNEE ARTHROSCOPY Bilateral    • NEUROPLASTY / TRANSPOSITION ULNAR NERVE AT ELBOW     • OTHER SURGICAL HISTORY      Foraminotomy and discectomy   • PERICARDIAL WINDOW N/A 8/25/2017    Procedure: PERICARDIAL WINDOW;  Surgeon: Freeman Phillips MD;  Location:  [facility];  Service:        Current Outpatient Prescriptions:   •  albuterol (PROVENTIL HFA;VENTOLIN HFA) 108 (90 BASE) MCG/ACT inhaler, Inhale 2 puffs Every 4 (Four) Hours As Needed. ProAir  (90 Base) MCG/ACT Inhalation Aerosol Solution; Patient Sig: ProAir  (90 Base) MCG/ACT Inhalation Aerosol Solution ; 8; 0; 05-Oct-2015; Active, Disp: , Rfl:   •  amLODIPine (NORVASC) 5 MG tablet, Take 1 tablet by mouth Daily., Disp: 90 tablet, Rfl: 1  •  aspirin  MG EC tablet, Take 1 tablet by mouth Daily., Disp: 30 tablet, Rfl: 2  •  atorvastatin (LIPITOR) 40 MG tablet, Take 1 tablet by mouth Every Night., Disp: 90 tablet, Rfl: 1  •  cephalexin (KEFLEX) 500 MG capsule, Take 1 capsule by mouth 2 (Two) Times a Day for 7 days., Disp: 14 capsule, Rfl: 0  •  Cetirizine HCl 10 MG capsule, Take 1 capsule by mouth daily., Disp: , Rfl:   •  colchicine 0.6 MG tablet, Take 1 tablet by mouth Daily., Disp: 30 tablet, Rfl: 2  •  flunisolide (NASALIDE) 25 MCG/ACT (0.025%) solution nasal spray, Inhale 1 spray Every 12 (Twelve) Hours As Needed (nasal congestion)., Disp: , Rfl:   •  furosemide (LASIX) 40 MG tablet, One  tablet on Sundays, Tuesdays, and Thursdays, Disp: 15 tablet, Rfl: 1  •  gabapentin (NEURONTIN) 300 MG capsule, Take 1 capsule by mouth 2 (Two) Times a Day., Disp: 60 capsule, Rfl: 5  •  HUMALOG KWIKPEN 100 UNIT/ML solution pen-injector, Inject 1 dose as directed Take As Directed. Follows SSI From PCP, Disp: , Rfl: 0  •  HYDROcodone-acetaminophen (NORCO) 7.5-325 MG per tablet, Take 1 tablet by mouth Every 6 (Six) Hours As Needed for Moderate Pain ., Disp: 30 tablet, Rfl: 0  •  insulin lispro (humaLOG) 100 UNIT/ML injection, Inject 40 Units under the skin 3 (Three) Times a Day Before Meals., Disp: , Rfl:   •  Insulin Pen Needle 31G X 5 MM misc, , Disp: , Rfl:   •  lisinopril (PRINIVIL,ZESTRIL) 40 MG tablet, Take 1 tablet by mouth daily., Disp: , Rfl:   •  metoprolol tartrate (LOPRESSOR) 50 MG tablet, Take 1 tablet by mouth Every 12 (Twelve) Hours., Disp: 180 tablet, Rfl: 1  •  Misc Natural Products (TUMERSAID) tablet, Take 1 tablet by mouth Daily., Disp: , Rfl:   •  montelukast (SINGULAIR) 10 MG tablet, take 1 tablet by mouth at bedtime, Disp: 30 tablet, Rfl: 2  •  omeprazole (PriLOSEC) 20 MG capsule, Take 1 capsule by mouth daily., Disp: , Rfl:   •  potassium chloride (K-DUR,KLOR-CON) 20 MEQ CR tablet, One tablet on Sundays, Tuesdays, and Thursdays, Disp: 15 tablet, Rfl: 1  •  ranolazine (RANEXA) 500 MG 12 hr tablet, Take 1 tablet by mouth 2 (Two) Times a Day., Disp: 60 tablet, Rfl: 11  •  topiramate (TOPAMAX) 100 MG tablet, Take 100 mg by mouth Daily., Disp: , Rfl:   •  TOUJEO SOLOSTAR 300 UNIT/ML solution pen-injector, Inject 80 Units as directed Every Night., Disp: , Rfl: 0  •  Vortioxetine HBr (TRINTELLIX) 10 MG tablet, Take 10 mg by mouth Daily., Disp: , Rfl:   Allergies   Allergen Reactions   • Sulfa Antibiotics Anaphylaxis, Nausea And Vomiting and Delirium   • Codeine Nausea And Vomiting     Can only take with Phenergan   • Invokana [Canagliflozin]      DKA   • Morphine      Does not work. It causes pain  "    Social History     Social History   • Marital status:      Spouse name: N/A   • Number of children: 1   • Years of education: N/A     Occupational History   • [employer]      Disabled since 2002-Back Problems     Social History Main Topics   • Smoking status: Former Smoker     Packs/day: 1.00     Years: 10.00     Types: Cigarettes     Quit date: 1/1/1998   • Smokeless tobacco: Former User     Types: Snuff     Quit date: 5/24/2017   • Alcohol use Yes      Comment: 3 PER YEAR   • Drug use: No   • Sexual activity: Defer     Other Topics Concern   • Not on file     Social History Narrative    Caffeine use: 0 serving daily.    Patient lives at home with wife.          Family History   Problem Relation Age of Onset   • Hypertension Mother    • Arthritis Mother    • Alcohol abuse Father    • Heart disease Other    • Stroke Other    • Hypertension Other    • Other Other      Neurologic disorder   • Parkinsonism Other    • Stroke Other    • Heart disease Other    • Hypertension Other    • Heart disease Other    • Stroke Other    • Hypertension Other      ROS see office note dated 09/21/2017  Vitals:    09/28/17 1117   BP: 129/85   BP Location: Right arm   Patient Position: Sitting   Pulse: 81   Temp: 96.9 °F (36.1 °C)   TempSrc: Temporal Artery    SpO2: 98%   Weight: 262 lb 9.6 oz (119 kg)   Height: 70\" (177.8 cm)      Physical Exam   Constitutional: He is oriented to person, place, and time. He appears well-developed and well-nourished. No distress.   HENT:   Head: Normocephalic.   Right Ear: External ear normal.   Left Ear: External ear normal.   Nose: Nose normal.   Eyes: Pupils are equal, round, and reactive to light.   Neck: Normal range of motion. Neck supple. No tracheal deviation present. No thyromegaly present.   Cardiovascular: Normal rate, normal heart sounds and intact distal pulses.    No murmur heard.  Pulmonary/Chest: Effort normal and breath sounds normal. No respiratory " distress. He has no wheezes. He has no rales. He exhibits no tenderness.   Abdominal: Soft. Bowel sounds are normal. He exhibits no distension and no mass. There is no tenderness.   Musculoskeletal: Normal range of motion. He exhibits no edema.   2 inch area of superficial separation at the midpoint of the left anterior thoracotomy incision the area was approximately 1 mm deep.   Lymphadenopathy:     He has no cervical adenopathy.   Neurological: He is alert and oriented to person, place, and time. He has normal reflexes. No cranial nerve deficit.   Skin: Skin is warm and dry. No rash noted. No erythema.   Psychiatric: He has a normal mood and affect.       Labs/Imaging: None    Assessment: Slight wound separation left anterior thoracotomy incision      Plan: Change Keflex to clindamycin 150 mg twice a day and start local wound care with hydrogen peroxide and Neosporin.  Return to clinic in 2 weeks          Patient Active Problem List   Diagnosis   • Coronary artery disease involving native coronary artery of native heart with unstable angina pectoris   • Lumbar radiculopathy   • Hypercholesterolemia   • Diabetic polyneuropathy associated with type 2 diabetes mellitus   • Migraine with aura and with status migrainosus   • Palpitations   • Seizure disorder   • GERD (gastroesophageal reflux disease)   • Brachial neuritis   • Cervical radiculopathy   • LOM (loss of memory)   • Anxiety   • Diabetes mellitus   • Lower back pain   • Essential hypertension   • History of CVA (cerebrovascular accident)   • Non morbid obesity due to excess calories   • Post-infarction pericarditis   • HCAP (healthcare-associated pneumonia)     Signed by:      Freeman Phillips MD  [facility]  09/29/17   12:21 PM

## 2017-10-06 LAB
FUNGUS WND CULT: NORMAL
MYCOBACTERIUM SPEC CULT: NORMAL
NIGHT BLUE STAIN TISS: NORMAL

## 2017-10-10 ENCOUNTER — TREATMENT (OUTPATIENT)
Dept: CARDIAC REHAB | Facility: HOSPITAL | Age: 57
End: 2017-10-10

## 2017-10-10 ENCOUNTER — TRANSCRIBE ORDERS (OUTPATIENT)
Dept: CARDIAC REHAB | Facility: HOSPITAL | Age: 57
End: 2017-10-10

## 2017-10-10 DIAGNOSIS — Z95.1 S/P CABG (CORONARY ARTERY BYPASS GRAFT): Primary | ICD-10-CM

## 2017-10-10 PROCEDURE — 93798 PHYS/QHP OP CAR RHAB W/ECG: CPT

## 2017-10-10 PROCEDURE — 93797 PHYS/QHP OP CAR RHAB WO ECG: CPT

## 2017-10-10 NOTE — PROGRESS NOTES
Pt was seen today in [facility] for a Phase 2 visit.  Vital signs and session notes recorded in Digital Path and will be scanned into Epic by HIM.

## 2017-10-12 ENCOUNTER — OFFICE VISIT (OUTPATIENT)
Dept: CARDIAC SURGERY | Facility: CLINIC | Age: 57
End: 2017-10-12

## 2017-10-12 VITALS
WEIGHT: 261.6 LBS | HEIGHT: 70 IN | SYSTOLIC BLOOD PRESSURE: 129 MMHG | HEART RATE: 87 BPM | TEMPERATURE: 97.6 F | OXYGEN SATURATION: 97 % | BODY MASS INDEX: 37.45 KG/M2 | DIASTOLIC BLOOD PRESSURE: 74 MMHG

## 2017-10-12 DIAGNOSIS — T81.31XD WOUND DEHISCENCE, SURGICAL, SUBSEQUENT ENCOUNTER: ICD-10-CM

## 2017-10-12 DIAGNOSIS — I31.39 PERICARDIAL EFFUSION: Primary | ICD-10-CM

## 2017-10-12 PROCEDURE — 99024 POSTOP FOLLOW-UP VISIT: CPT | Performed by: THORACIC SURGERY (CARDIOTHORACIC VASCULAR SURGERY)

## 2017-10-12 NOTE — PROGRESS NOTES
10/12/2017  Patient Information  Denzel Harding                                                                                          [address] [facility] [place] [address]   1960  'PCP/Referring Physician'  Ottoniel Toledo MD  [phone]  No ref. provider found    Chief Complaint   Patient presents with   • Follow-up     2 wk f/u per [facility]. Patient states that his incision is healing nicely      CC: I am doing better     History of Present Illness: 57-year-old  male now 7 weeks postoperative left anterior thoracotomy and pericardial window.  The patient had some separation in the midportion of the wound when last seen. He has been treating this locally with hydrogen peroxide and Neosporin.  The wound is improved significantly.  The patient denies fever, chills, and night sweats.  He has not had any purulent drainage.      Patient Active Problem List   Diagnosis   • Coronary artery disease involving native coronary artery of native heart with unstable angina pectoris   • Lumbar radiculopathy   • Hypercholesterolemia   • Diabetic polyneuropathy associated with type 2 diabetes mellitus   • Migraine with aura and with status migrainosus   • Palpitations   • Seizure disorder   • GERD (gastroesophageal reflux disease)   • Brachial neuritis   • Cervical radiculopathy   • LOM (loss of memory)   • Anxiety   • Diabetes mellitus   • Lower back pain   • Essential hypertension   • History of CVA (cerebrovascular accident)   • Non morbid obesity due to excess calories   • Post-infarction pericarditis   • HCAP (healthcare-associated pneumonia)     Past Medical History:   Diagnosis Date   • Anxiety    • Arthritis    • Asthma    • BiPAP (biphasic positive airway pressure) dependence    • Brachial neuritis 1/19/2017   • Chest pain    • COPD (chronic obstructive pulmonary disease)    • Depression    • Diabetes mellitus    • Dizziness    • Edema    • GERD (gastroesophageal reflux disease)    • H/O chest x-ray  07/04/2015    Mild Left base atelectasis   • H/O echocardiogram 07/05/2015    Normal LVSF. EF of 60-65%. Grade 1 diastolic dysfunction of the LV myocardium. No evidence of pericardial effusion   • H/O exercise stress test    • History of herniated intervertebral disc     History of left L5-S1 disc herniation   • LOM (loss of memory) 1/19/2017   • Lower back pain     Right   • Measles    • Obstructive sleep apnea    • Palpitations    • Pericardial effusion    • Seizure disorder    • Shortness of breath 1/19/2017   • SOB (shortness of breath)    • Stroke    • Wears glasses      Past Surgical History:   Procedure Laterality Date   • BACK SURGERY     • CARDIAC CATHETERIZATION     • CARDIAC CATHETERIZATION N/A 8/11/2017    Procedure: Coronary angiography;  Surgeon: Dallas Kim MD;  Location: [facility] CATH INVASIVE LOCATION;  Service:    • CARDIAC CATHETERIZATION N/A 8/11/2017    Procedure: Left Heart Cath;  Surgeon: Dallas Kim MD;  Location: [facility] CATH INVASIVE LOCATION;  Service:    • CARDIAC CATHETERIZATION N/A 8/11/2017    Procedure: Left ventriculography;  Surgeon: Dallas Kim MD;  Location: [facility] CATH INVASIVE LOCATION;  Service:    • CARDIOVASCULAR STRESS TEST  07/03/2017    WITH DR KIM AT [facility]   • CARPAL TUNNEL RELEASE Right    • CATARACT EXTRACTION W/ INTRAOCULAR LENS IMPLANT Right 6/12/2017    Procedure: CATARACT PHACO EXTRACTION WITH INTRAOCULAR LENS IMPLANT RIGHT ;  Surgeon: Natalie Cao MD;  Location: [facility] OR;  Service:    • CATARACT EXTRACTION W/ INTRAOCULAR LENS IMPLANT Left 7/10/2017    Procedure: CATARACT PHACO EXTRACTION WITH INTRAOCULAR LENS IMPLANT LEFT;  Surgeon: Natalie Cao MD;  Location: [facility] OR;  Service:    • CHOLECYSTECTOMY     • COLONOSCOPY     • CORONARY ANGIOPLASTY WITH STENT PLACEMENT     • CORONARY ANGIOPLASTY WITH STENT PLACEMENT      X1-LAD   • CORONARY ARTERY BYPASS GRAFT N/A 8/15/2017    Procedure: CORONARY ARTERY BYPASS GRAFTING x 3 UTILIZING THE LEFT  INTERNAL MAMMARY ARTERY WITH ENDOSCOPIC VEIN HARVESTING OF THE RIGHT GREATER SAPHENOUS VEIN, HAMLET, LAD ENDARECTOMY;  Surgeon: London Damon MD;  Location:  [facility];  Service:    • ENDOSCOPY     • EYE SURGERY      cataract surgery both eyes   • KNEE ARTHROSCOPY Bilateral    • NEUROPLASTY / TRANSPOSITION ULNAR NERVE AT ELBOW     • OTHER SURGICAL HISTORY      Foraminotomy and discectomy   • PERICARDIAL WINDOW N/A 8/25/2017    Procedure: PERICARDIAL WINDOW;  Surgeon: Freeman Phillips MD;  Location:  [facility];  Service:        Current Outpatient Prescriptions:   •  albuterol (PROVENTIL HFA;VENTOLIN HFA) 108 (90 BASE) MCG/ACT inhaler, Inhale 2 puffs Every 4 (Four) Hours As Needed. ProAir  (90 Base) MCG/ACT Inhalation Aerosol Solution; Patient Sig: ProAir  (90 Base) MCG/ACT Inhalation Aerosol Solution ; 8; 0; 05-Oct-2015; Active, Disp: , Rfl:   •  amLODIPine (NORVASC) 5 MG tablet, Take 1 tablet by mouth Daily., Disp: 90 tablet, Rfl: 1  •  aspirin  MG EC tablet, Take 1 tablet by mouth Daily., Disp: 30 tablet, Rfl: 2  •  atorvastatin (LIPITOR) 40 MG tablet, Take 1 tablet by mouth Every Night., Disp: 90 tablet, Rfl: 1  •  Cetirizine HCl 10 MG capsule, Take 1 capsule by mouth daily., Disp: , Rfl:   •  colchicine 0.6 MG tablet, Take 1 tablet by mouth Daily., Disp: 30 tablet, Rfl: 2  •  flunisolide (NASALIDE) 25 MCG/ACT (0.025%) solution nasal spray, Inhale 1 spray Every 12 (Twelve) Hours As Needed (nasal congestion)., Disp: , Rfl:   •  furosemide (LASIX) 40 MG tablet, One tablet on Sundays, Tuesdays, and Thursdays, Disp: 15 tablet, Rfl: 1  •  gabapentin (NEURONTIN) 300 MG capsule, Take 1 capsule by mouth 2 (Two) Times a Day., Disp: 60 capsule, Rfl: 5  •  HUMALOG KWIKPEN 100 UNIT/ML solution pen-injector, Inject 1 dose as directed Take As Directed. Follows SSI From PCP, Disp: , Rfl: 0  •  insulin lispro (humaLOG) 100 UNIT/ML injection, Inject 40 Units under the skin 3 (Three) Times a Day Before Meals., Disp: ,  Rfl:   •  Insulin Pen Needle 31G X 5 MM misc, , Disp: , Rfl:   •  lisinopril (PRINIVIL,ZESTRIL) 40 MG tablet, Take 1 tablet by mouth daily., Disp: , Rfl:   •  metoprolol tartrate (LOPRESSOR) 50 MG tablet, Take 1 tablet by mouth Every 12 (Twelve) Hours., Disp: 180 tablet, Rfl: 1  •  Misc Natural Products (TUMERSAID) tablet, Take 1 tablet by mouth Daily., Disp: , Rfl:   •  montelukast (SINGULAIR) 10 MG tablet, take 1 tablet by mouth at bedtime, Disp: 30 tablet, Rfl: 2  •  omeprazole (PriLOSEC) 20 MG capsule, Take 1 capsule by mouth daily., Disp: , Rfl:   •  potassium chloride (K-DUR,KLOR-CON) 20 MEQ CR tablet, One tablet on Sundays, Tuesdays, and Thursdays, Disp: 15 tablet, Rfl: 1  •  ranolazine (RANEXA) 500 MG 12 hr tablet, Take 1 tablet by mouth 2 (Two) Times a Day., Disp: 60 tablet, Rfl: 11  •  topiramate (TOPAMAX) 100 MG tablet, Take 100 mg by mouth Daily., Disp: , Rfl:   •  TOUJEO SOLOSTAR 300 UNIT/ML solution pen-injector, Inject 80 Units as directed Every Night., Disp: , Rfl: 0  •  Vortioxetine HBr (TRINTELLIX) 10 MG tablet, Take 10 mg by mouth Daily., Disp: , Rfl:   Allergies   Allergen Reactions   • Sulfa Antibiotics Anaphylaxis, Nausea And Vomiting and Delirium   • Codeine Nausea And Vomiting     Can only take with Phenergan   • Invokana [Canagliflozin]      DKA   • Morphine      Does not work. It causes pain     Social History     Social History   • Marital status:      Spouse name: N/A   • Number of children: 1   • Years of education: N/A     Occupational History   • [employer] and [employer]      Disabled since 2002-Back Problems     Social History Main Topics   • Smoking status: Former Smoker     Packs/day: 1.00     Years: 10.00     Types: Cigarettes     Quit date: 1/1/1998   • Smokeless tobacco: Former User     Types: Snuff     Quit date: 5/24/2017   • Alcohol use Yes      Comment: 3 PER YEAR   • Drug use: No   • Sexual activity: Defer     Other Topics Concern   • Not on file     Social  "History Narrative    Caffeine use: 0 serving daily.    Patient lives at home with wife.          Family History   Problem Relation Age of Onset   • Hypertension Mother    • Arthritis Mother    • Alcohol abuse Father    • Heart disease Other    • Stroke Other    • Hypertension Other    • Other Other      Neurologic disorder   • Parkinsonism Other    • Stroke Other    • Heart disease Other    • Hypertension Other    • Heart disease Other    • Stroke Other    • Hypertension Other      ROS  Vitals:    10/12/17 1103   BP: 129/74   BP Location: Right arm   Patient Position: Sitting   Pulse: 87   Temp: 97.6 °F (36.4 °C)   TempSrc: Temporal Artery    SpO2: 97%   Weight: 261 lb 9.6 oz (119 kg)   Height: 70\" (177.8 cm)      Physical Exam   Constitutional: He is oriented to person, place, and time. He appears well-developed and well-nourished. No distress.   HENT:   Head: Normocephalic.   Right Ear: External ear normal.   Left Ear: External ear normal.   Nose: Nose normal.   Eyes: Pupils are equal, round, and reactive to light.   Neck: Normal range of motion. Neck supple. No tracheal deviation present. No thyromegaly present.   Cardiovascular: Normal rate, normal heart sounds and intact distal pulses.    No murmur heard.  Pulmonary/Chest: Effort normal and breath sounds normal. No respiratory distress. He has no wheezes. He has no rales. He exhibits no tenderness.   Abdominal: Soft. Bowel sounds are normal. He exhibits no distension and no mass. There is no tenderness.   Musculoskeletal: Normal range of motion. He exhibits no edema.   Left anterior thoracotomy wound with a 1 inch area in the center portion of the wound that has a 1 mm deep separation.  No drainage no erythema no evidence of infection.  Patient is using local wound care (hydrogen peroxide and Neosporin).   Lymphadenopathy:     He has no cervical adenopathy.   Neurological: He is alert and oriented to person, place, and time. He has normal reflexes. No cranial " nerve deficit.   Skin: Skin is warm and dry. No rash noted. No erythema.   Psychiatric: He has a normal mood and affect.       Labs/Imaging:none    Assessment:Superficial wound dehiscence    Plan: Continue current local care and return to clinic in 1 month        Patient Active Problem List   Diagnosis   • Coronary artery disease involving native coronary artery of native heart with unstable angina pectoris   • Lumbar radiculopathy   • Hypercholesterolemia   • Diabetic polyneuropathy associated with type 2 diabetes mellitus   • Migraine with aura and with status migrainosus   • Palpitations   • Seizure disorder   • GERD (gastroesophageal reflux disease)   • Brachial neuritis   • Cervical radiculopathy   • LOM (loss of memory)   • Anxiety   • Diabetes mellitus   • Lower back pain   • Essential hypertension   • History of CVA (cerebrovascular accident)   • Non morbid obesity due to excess calories   • Post-infarction pericarditis   • HCAP (healthcare-associated pneumonia)     Signed by:      Freeman Phillips MD  [facility]gery  10/12/17   11:35 AM

## 2017-10-13 ENCOUNTER — TREATMENT (OUTPATIENT)
Dept: CARDIAC REHAB | Facility: HOSPITAL | Age: 57
End: 2017-10-13

## 2017-10-13 DIAGNOSIS — Z95.1 S/P CABG (CORONARY ARTERY BYPASS GRAFT): Primary | ICD-10-CM

## 2017-10-13 PROCEDURE — 93798 PHYS/QHP OP CAR RHAB W/ECG: CPT

## 2017-10-16 ENCOUNTER — OFFICE VISIT (OUTPATIENT)
Dept: CARDIOLOGY | Facility: CLINIC | Age: 57
End: 2017-10-16

## 2017-10-16 VITALS
WEIGHT: 256 LBS | DIASTOLIC BLOOD PRESSURE: 83 MMHG | SYSTOLIC BLOOD PRESSURE: 134 MMHG | HEART RATE: 76 BPM | HEIGHT: 70 IN | BODY MASS INDEX: 36.65 KG/M2

## 2017-10-16 DIAGNOSIS — I25.110 CORONARY ARTERY DISEASE INVOLVING NATIVE CORONARY ARTERY OF NATIVE HEART WITH UNSTABLE ANGINA PECTORIS (HCC): Primary | ICD-10-CM

## 2017-10-16 DIAGNOSIS — I10 ESSENTIAL HYPERTENSION: ICD-10-CM

## 2017-10-16 DIAGNOSIS — E78.00 HYPERCHOLESTEROLEMIA: ICD-10-CM

## 2017-10-16 DIAGNOSIS — R00.2 PALPITATIONS: ICD-10-CM

## 2017-10-16 LAB
CYTO UR: NORMAL
LAB AP CASE REPORT: NORMAL
LAB AP CLINICAL INFORMATION: NORMAL
Lab: NORMAL
PATH REPORT.ADDENDUM SPEC: NORMAL
PATH REPORT.FINAL DX SPEC: NORMAL
PATH REPORT.GROSS SPEC: NORMAL

## 2017-10-16 PROCEDURE — 99214 OFFICE O/P EST MOD 30 MIN: CPT | Performed by: INTERNAL MEDICINE

## 2017-10-16 RX ORDER — COLCHICINE 0.6 MG/1
0.6 TABLET ORAL DAILY
Qty: 30 TABLET | Refills: 1 | Status: SHIPPED | OUTPATIENT
Start: 2017-10-16 | End: 2018-06-18

## 2017-10-16 RX ORDER — ASPIRIN 81 MG/1
81 TABLET ORAL DAILY
Start: 2017-10-16 | End: 2017-12-22 | Stop reason: HOSPADM

## 2017-10-16 NOTE — PROGRESS NOTES
"[facility]  Office visit  Denzel Harding  1960  [phone]    VISIT DATE:  10/16/2017    PCP: Ottoniel Toledo MD  [address]  [facility] [address]    CC:  Chief Complaint   Patient presents with   • Coronary Artery Disease     follow up       PROBLEM LIST:  1. Post op pericarditis with effusion s/p left anterior thoracotomy and pericardial window 8/25/17  2. CABG x 3, LAD endarterectomy 8/15/17 Dr. Damon, Normal EF, DHF  3. HTN: Controlled, BB and Norvasc  4. CVA: Remote  5. DM  6. COPD/EDD  7. HLD    ASSESSMENT:   Diagnosis Plan   1. Coronary artery disease involving native coronary artery of native heart with unstable angina pectoris     2. Hypercholesterolemia     3. Palpitations     4. Essential hypertension         PLAN:  Coronary artery disease: Stable, status post surgical revascularization.  Decreasing aspirin 81 mg by mouth daily, otherwise continue current medical therapy    Postop pericarditis status post pericardial window: Continue colchicine for another 2 months then discontinue.    Hypertension: Well-controlled, continue current medical therapy, goal less than 140/90    Hyperlipidemia: Continue high intensity statin therapy, goal LDL less than 100    Subjective  Has recovered remarkably well after recent hospitalization for coronary bypass grafting which was complicated by postop pericarditis requiring a pericardial window.  He denies chest pain, dyspnea.  Has been progressing rapidly in cardiac rehabilitation.  Blood pressures running less than 140/90 mmHg.  He is compliant with medical therapy.  Denies myalgias on statin therapy.    PHYSICAL EXAMINATION:  Vitals:    10/16/17 1447   BP: 134/83   BP Location: Left arm   Patient Position: Sitting   Pulse: 76   Weight: 256 lb (116 kg)   Height: 70\" (177.8 cm)     General Appearance:    Alert, cooperative, no distress, appears stated age   Head:    Normocephalic, without obvious abnormality, atraumatic   Eyes:    " conjunctiva/corneas clear   Nose:   Nares normal, septum midline, mucosa normal, no drainage   Throat:   Lips, teeth and gums normal   Neck:   Supple, symmetrical, trachea midline, no carotid    bruit or JVD   Lungs:     Clear to auscultation bilaterally, respirations unlabored   Chest Wall:    No tenderness or deformity    Heart:    Regular rate and rhythm, S1 and S2 normal, no murmur, rub   or gallop, normal carotid impulse bilaterally without bruit.   Abdomen:     Soft, non-tender   Extremities:   Extremities normal, atraumatic, no cyanosis or edema   Pulses:   2+ and symmetric all extremities   Skin:   Skin color, texture, turgor normal, no rashes or lesions       Diagnostic Data:  Procedures  Lab Results   Component Value Date    TRIG 165 (H) 08/12/2017    HDL 29 (L) 08/12/2017    LDLDIRECT 74 08/12/2017     Lab Results   Component Value Date    GLUCOSE 171 (H) 09/24/2017    BUN 11 09/24/2017    CREATININE 0.70 09/24/2017     09/24/2017    K 3.6 09/24/2017     09/24/2017    CO2 25.0 09/24/2017     Lab Results   Component Value Date    HGBA1C 8.90 (H) 08/11/2017     Lab Results   Component Value Date    WBC 5.92 09/24/2017    HGB 10.9 (L) 09/24/2017    HCT 34.2 (L) 09/24/2017     09/24/2017       Allergies  Allergies   Allergen Reactions   • Sulfa Antibiotics Anaphylaxis, Nausea And Vomiting and Delirium   • Codeine Nausea And Vomiting     Can only take with Phenergan   • Invokana [Canagliflozin]      DKA   • Morphine      Does not work. It causes pain       Current Medications    Current Outpatient Prescriptions:   •  albuterol (PROVENTIL HFA;VENTOLIN HFA) 108 (90 BASE) MCG/ACT inhaler, Inhale 2 puffs Every 4 (Four) Hours As Needed. ProAir  (90 Base) MCG/ACT Inhalation Aerosol Solution; Patient Sig: ProAir  (90 Base) MCG/ACT Inhalation Aerosol Solution ; 8; 0; 05-Oct-2015; Active, Disp: , Rfl:   •  amLODIPine (NORVASC) 5 MG tablet, Take 1 tablet by mouth Daily., Disp: 90 tablet,  Rfl: 1  •  aspirin  MG EC tablet, Take 1 tablet by mouth Daily., Disp: 30 tablet, Rfl: 2  •  atorvastatin (LIPITOR) 40 MG tablet, Take 1 tablet by mouth Every Night., Disp: 90 tablet, Rfl: 1  •  Cetirizine HCl 10 MG capsule, Take 1 capsule by mouth daily., Disp: , Rfl:   •  colchicine 0.6 MG tablet, Take 1 tablet by mouth Daily., Disp: 30 tablet, Rfl: 1  •  flunisolide (NASALIDE) 25 MCG/ACT (0.025%) solution nasal spray, Inhale 1 spray Every 12 (Twelve) Hours As Needed (nasal congestion)., Disp: , Rfl:   •  furosemide (LASIX) 40 MG tablet, One tablet on Sundays, Tuesdays, and Thursdays, Disp: 15 tablet, Rfl: 1  •  gabapentin (NEURONTIN) 300 MG capsule, Take 1 capsule by mouth 2 (Two) Times a Day., Disp: 60 capsule, Rfl: 5  •  HUMALOG KWIKPEN 100 UNIT/ML solution pen-injector, Inject 1 dose as directed Take As Directed. Follows SSI From PCP, Disp: , Rfl: 0  •  insulin lispro (humaLOG) 100 UNIT/ML injection, Inject 40 Units under the skin 3 (Three) Times a Day Before Meals., Disp: , Rfl:   •  Insulin Pen Needle 31G X 5 MM misc, , Disp: , Rfl:   •  lisinopril (PRINIVIL,ZESTRIL) 40 MG tablet, Take 1 tablet by mouth daily., Disp: , Rfl:   •  metoprolol tartrate (LOPRESSOR) 50 MG tablet, Take 1 tablet by mouth Every 12 (Twelve) Hours., Disp: 180 tablet, Rfl: 1  •  Misc Natural Products (TUMERSAID) tablet, Take 1 tablet by mouth Daily., Disp: , Rfl:   •  montelukast (SINGULAIR) 10 MG tablet, take 1 tablet by mouth at bedtime, Disp: 30 tablet, Rfl: 2  •  omeprazole (PriLOSEC) 20 MG capsule, Take 1 capsule by mouth daily., Disp: , Rfl:   •  potassium chloride (K-DUR,KLOR-CON) 20 MEQ CR tablet, One tablet on Sundays, Tuesdays, and Thursdays, Disp: 15 tablet, Rfl: 1  •  ranolazine (RANEXA) 500 MG 12 hr tablet, Take 1 tablet by mouth 2 (Two) Times a Day., Disp: 60 tablet, Rfl: 11  •  topiramate (TOPAMAX) 100 MG tablet, Take 100 mg by mouth Daily., Disp: , Rfl:   •  TOUJEO SOLOSTAR 300 UNIT/ML solution pen-injector, Inject  80 Units as directed Every Night., Disp: , Rfl: 0  •  Vortioxetine HBr (TRINTELLIX) 10 MG tablet, Take 10 mg by mouth Daily., Disp: , Rfl:           ROS  Review of Systems   Cardiovascular: Negative for chest pain, dyspnea on exertion, irregular heartbeat and palpitations.   Respiratory: Negative for shortness of breath, snoring and sputum production.        SOCIAL HX  Social History     Social History   • Marital status:      Spouse name: N/A   • Number of children: 1   • Years of education: N/A     Occupational History   • [employer]      Disabled since 2002-Back Problems     Social History Main Topics   • Smoking status: Former Smoker     Packs/day: 1.00     Years: 10.00     Types: Cigarettes     Quit date: 1/1/1998   • Smokeless tobacco: Former User     Types: Snuff     Quit date: 5/24/2017   • Alcohol use Yes      Comment: 3 PER YEAR   • Drug use: No   • Sexual activity: Defer     Other Topics Concern   • Not on file     Social History Narrative    Caffeine use: 0 serving daily.    Patient lives at home with wife.            FAMILY HX  Family History   Problem Relation Age of Onset   • Hypertension Mother    • Arthritis Mother    • Alcohol abuse Father    • Heart disease Other    • Stroke Other    • Hypertension Other    • Other Other      Neurologic disorder   • Parkinsonism Other    • Stroke Other    • Heart disease Other    • Hypertension Other    • Heart disease Other    • Stroke Other    • Hypertension Other              London Jiménez III, MD, FACC

## 2017-10-23 ENCOUNTER — APPOINTMENT (OUTPATIENT)
Dept: CARDIAC REHAB | Facility: HOSPITAL | Age: 57
End: 2017-10-23

## 2017-10-25 ENCOUNTER — APPOINTMENT (OUTPATIENT)
Dept: CARDIAC REHAB | Facility: HOSPITAL | Age: 57
End: 2017-10-25

## 2017-10-27 ENCOUNTER — APPOINTMENT (OUTPATIENT)
Dept: CARDIAC REHAB | Facility: HOSPITAL | Age: 57
End: 2017-10-27

## 2017-10-28 ENCOUNTER — APPOINTMENT (OUTPATIENT)
Dept: GENERAL RADIOLOGY | Facility: HOSPITAL | Age: 57
End: 2017-10-28

## 2017-10-28 ENCOUNTER — HOSPITAL ENCOUNTER (EMERGENCY)
Facility: HOSPITAL | Age: 57
Discharge: HOME | End: 2017-10-28
Attending: EMERGENCY MEDICINE | Admitting: EMERGENCY MEDICINE

## 2017-10-28 ENCOUNTER — APPOINTMENT (OUTPATIENT)
Dept: CT IMAGING | Facility: HOSPITAL | Age: 57
End: 2017-10-28

## 2017-10-28 VITALS
HEART RATE: 86 BPM | BODY MASS INDEX: 37.51 KG/M2 | HEIGHT: 70 IN | WEIGHT: 262 LBS | OXYGEN SATURATION: 96 % | RESPIRATION RATE: 16 BRPM | TEMPERATURE: 99.6 F | DIASTOLIC BLOOD PRESSURE: 75 MMHG | SYSTOLIC BLOOD PRESSURE: 126 MMHG

## 2017-10-28 DIAGNOSIS — M54.2 ACUTE NECK PAIN: ICD-10-CM

## 2017-10-28 DIAGNOSIS — J90 PLEURAL EFFUSION, BACTERIAL: ICD-10-CM

## 2017-10-28 DIAGNOSIS — R07.81 PLEURODYNIA: Primary | ICD-10-CM

## 2017-10-28 DIAGNOSIS — R05.9 COUGH: ICD-10-CM

## 2017-10-28 DIAGNOSIS — R07.89 ACUTE CHEST WALL PAIN: ICD-10-CM

## 2017-10-28 LAB
ALBUMIN SERPL-MCNC: 4.8 G/DL (ref 3.2–4.8)
ALBUMIN/GLOB SERPL: 1.6 G/DL (ref 1.5–2.5)
ALP SERPL-CCNC: 124 U/L (ref 25–100)
ALT SERPL W P-5'-P-CCNC: 34 U/L (ref 7–40)
ANION GAP SERPL CALCULATED.3IONS-SCNC: 12 MMOL/L (ref 3–11)
AST SERPL-CCNC: 21 U/L (ref 0–33)
BASOPHILS # BLD AUTO: 0.04 10*3/MM3 (ref 0–0.2)
BASOPHILS NFR BLD AUTO: 0.4 % (ref 0–1)
BILIRUB SERPL-MCNC: 0.7 MG/DL (ref 0.3–1.2)
BILIRUB UR QL STRIP: NEGATIVE
BNP SERPL-MCNC: 17 PG/ML (ref 0–100)
BUN BLD-MCNC: 15 MG/DL (ref 9–23)
BUN/CREAT SERPL: 18.8 (ref 7–25)
CALCIUM SPEC-SCNC: 9.9 MG/DL (ref 8.7–10.4)
CHLORIDE SERPL-SCNC: 104 MMOL/L (ref 99–109)
CK SERPL-CCNC: 62 U/L (ref 26–174)
CLARITY UR: CLEAR
CO2 SERPL-SCNC: 24 MMOL/L (ref 20–31)
COLOR UR: YELLOW
CREAT BLD-MCNC: 0.8 MG/DL (ref 0.6–1.3)
D DIMER PPP FEU-MCNC: 3.93 MG/L (FEU) (ref 0–0.5)
DEPRECATED RDW RBC AUTO: 41.2 FL (ref 37–54)
EOSINOPHIL # BLD AUTO: 0.32 10*3/MM3 (ref 0–0.3)
EOSINOPHIL NFR BLD AUTO: 2.9 % (ref 0–3)
ERYTHROCYTE [DISTWIDTH] IN BLOOD BY AUTOMATED COUNT: 14.7 % (ref 11.3–14.5)
GFR SERPL CREATININE-BSD FRML MDRD: 100 ML/MIN/1.73
GLOBULIN UR ELPH-MCNC: 3 GM/DL
GLUCOSE BLD-MCNC: 189 MG/DL (ref 70–100)
GLUCOSE UR STRIP-MCNC: NEGATIVE MG/DL
HCT VFR BLD AUTO: 43.5 % (ref 38.9–50.9)
HGB BLD-MCNC: 14.2 G/DL (ref 13.1–17.5)
HGB UR QL STRIP.AUTO: NEGATIVE
IMM GRANULOCYTES # BLD: 0.02 10*3/MM3 (ref 0–0.03)
IMM GRANULOCYTES NFR BLD: 0.2 % (ref 0–0.6)
KETONES UR QL STRIP: NEGATIVE
LEUKOCYTE ESTERASE UR QL STRIP.AUTO: NEGATIVE
LYMPHOCYTES # BLD AUTO: 2.09 10*3/MM3 (ref 0.6–4.8)
LYMPHOCYTES NFR BLD AUTO: 19.1 % (ref 24–44)
MCH RBC QN AUTO: 25.2 PG (ref 27–31)
MCHC RBC AUTO-ENTMCNC: 32.6 G/DL (ref 32–36)
MCV RBC AUTO: 77.3 FL (ref 80–99)
MONOCYTES # BLD AUTO: 0.83 10*3/MM3 (ref 0–1)
MONOCYTES NFR BLD AUTO: 7.6 % (ref 0–12)
NEUTROPHILS # BLD AUTO: 7.66 10*3/MM3 (ref 1.5–8.3)
NEUTROPHILS NFR BLD AUTO: 69.8 % (ref 41–71)
NITRITE UR QL STRIP: NEGATIVE
PH UR STRIP.AUTO: 5.5 [PH] (ref 5–8)
PLATELET # BLD AUTO: 223 10*3/MM3 (ref 150–450)
PMV BLD AUTO: 11 FL (ref 6–12)
POTASSIUM BLD-SCNC: 3.8 MMOL/L (ref 3.5–5.5)
PROT SERPL-MCNC: 7.8 G/DL (ref 5.7–8.2)
PROT UR QL STRIP: NEGATIVE
RBC # BLD AUTO: 5.63 10*6/MM3 (ref 4.2–5.76)
SODIUM BLD-SCNC: 140 MMOL/L (ref 132–146)
SP GR UR STRIP: 1.02 (ref 1–1.03)
TROPONIN I SERPL-MCNC: 0 NG/ML (ref 0–0.07)
TROPONIN I SERPL-MCNC: <0.006 NG/ML
UROBILINOGEN UR QL STRIP: NORMAL
WBC NRBC COR # BLD: 10.96 10*3/MM3 (ref 3.5–10.8)

## 2017-10-28 PROCEDURE — 82550 ASSAY OF CK (CPK): CPT | Performed by: EMERGENCY MEDICINE

## 2017-10-28 PROCEDURE — 80053 COMPREHEN METABOLIC PANEL: CPT | Performed by: PHYSICIAN ASSISTANT

## 2017-10-28 PROCEDURE — 71275 CT ANGIOGRAPHY CHEST: CPT

## 2017-10-28 PROCEDURE — 71020 HC CHEST PA AND LATERAL: CPT

## 2017-10-28 PROCEDURE — 85025 COMPLETE CBC W/AUTO DIFF WBC: CPT | Performed by: PHYSICIAN ASSISTANT

## 2017-10-28 PROCEDURE — 85379 FIBRIN DEGRADATION QUANT: CPT | Performed by: PHYSICIAN ASSISTANT

## 2017-10-28 PROCEDURE — 84484 ASSAY OF TROPONIN QUANT: CPT | Performed by: PHYSICIAN ASSISTANT

## 2017-10-28 PROCEDURE — 93005 ELECTROCARDIOGRAM TRACING: CPT | Performed by: PHYSICIAN ASSISTANT

## 2017-10-28 PROCEDURE — 99284 EMERGENCY DEPT VISIT MOD MDM: CPT

## 2017-10-28 PROCEDURE — 83880 ASSAY OF NATRIURETIC PEPTIDE: CPT | Performed by: PHYSICIAN ASSISTANT

## 2017-10-28 PROCEDURE — 93005 ELECTROCARDIOGRAM TRACING: CPT

## 2017-10-28 PROCEDURE — 74177 CT ABD & PELVIS W/CONTRAST: CPT

## 2017-10-28 PROCEDURE — 84484 ASSAY OF TROPONIN QUANT: CPT

## 2017-10-28 PROCEDURE — 81003 URINALYSIS AUTO W/O SCOPE: CPT | Performed by: PHYSICIAN ASSISTANT

## 2017-10-28 PROCEDURE — 0 IOPAMIDOL 61 % SOLUTION: Performed by: EMERGENCY MEDICINE

## 2017-10-28 PROCEDURE — 0 IOPAMIDOL PER 1 ML: Performed by: EMERGENCY MEDICINE

## 2017-10-28 RX ORDER — AZITHROMYCIN 250 MG/1
250 TABLET, FILM COATED ORAL DAILY
Qty: 4 TABLET | Refills: 0 | Status: ON HOLD | OUTPATIENT
Start: 2017-10-28 | End: 2017-12-18

## 2017-10-28 RX ORDER — ONDANSETRON 4 MG/1
4 TABLET, FILM COATED ORAL EVERY 8 HOURS PRN
Qty: 20 TABLET | Refills: 0 | Status: ON HOLD | OUTPATIENT
Start: 2017-10-28 | End: 2017-12-18

## 2017-10-28 RX ORDER — AZITHROMYCIN 250 MG/1
500 TABLET, FILM COATED ORAL ONCE
Status: COMPLETED | OUTPATIENT
Start: 2017-10-28 | End: 2017-10-28

## 2017-10-28 RX ADMIN — HYDROCODONE POLISTIREX AND CHLORPHENIRAMINE POLISTIREX 5 ML: 10; 8 SUSPENSION, EXTENDED RELEASE ORAL at 23:11

## 2017-10-28 RX ADMIN — IOPAMIDOL 100 ML: 612 INJECTION, SOLUTION INTRAVENOUS at 17:06

## 2017-10-28 RX ADMIN — AZITHROMYCIN 500 MG: 250 TABLET, FILM COATED ORAL at 23:10

## 2017-10-28 RX ADMIN — IOPAMIDOL 50 ML: 755 INJECTION, SOLUTION INTRAVENOUS at 19:18

## 2017-10-28 NOTE — ED PROVIDER NOTES
Subjective   HPI Comments: He has a history of CABG with open sternotomy thoracotomy proximal to 2 months ago.  Has been doing well since.  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  He is a nonsmoker.    Patient is a 57 y.o. male presenting with general illness.   History provided by:  Patient  Illness   Location:  Neck pain upper chest pain and pleurodynia since this morning.  History of recent CABG approximately 2 months ago.  Quality:  Aching  Severity:  Moderate  Onset quality:  Sudden  Duration:  1 day  Timing:  Intermittent  Progression:  Waxing and waning  Chronicity:  New  Context:  Patient awoke today with pain in the neck and upper chest area occasionally with unilateral upper chest pain with deep inspiration.  Has had a slight elevation of temperature tonight as well as a cough with nondescript sputum.  No hemoptysis.  No shortness of breath.  No arm  or jaw pain  Relieved by:  Nothing  Worsened by:  Deep breath  Ineffective treatments:  Nothing  Associated symptoms: abdominal pain (Mild diffuse upper abdominal pain), congestion, cough, fatigue and fever    Associated symptoms: no chest pain, no headaches, no myalgias, no nausea, no rash, no rhinorrhea, no shortness of breath and no vomiting        Review of Systems   Constitutional: Positive for fatigue and fever.   HENT: Positive for congestion. Negative for rhinorrhea.    Respiratory: Positive for cough. Negative for shortness of breath.    Cardiovascular: Negative for chest pain.   Gastrointestinal: Positive for abdominal pain (Mild diffuse upper abdominal pain). Negative for nausea and vomiting.   Musculoskeletal: Negative for myalgias.   Skin: Negative for rash.   Neurological: Negative for headaches.   All other systems reviewed and are negative.      Past Medical History:   Diagnosis Date   • Anxiety    • Arthritis     • Asthma    • BiPAP (biphasic positive airway pressure) dependence    • Brachial neuritis 1/19/2017   • Chest pain    • COPD (chronic obstructive pulmonary disease)    • Depression    • Diabetes mellitus    • Dizziness    • Edema    • GERD (gastroesophageal reflux disease)    • H/O chest x-ray 07/04/2015    Mild Left base atelectasis   • H/O echocardiogram 07/05/2015    Normal LVSF. EF of 60-65%. Grade 1 diastolic dysfunction of the LV myocardium. No evidence of pericardial effusion   • H/O exercise stress test    • History of herniated intervertebral disc     History of left L5-S1 disc herniation   • LOM (loss of memory) 1/19/2017   • Lower back pain     Right   • Measles    • Obstructive sleep apnea    • Palpitations    • Pericardial effusion    • Seizure disorder    • Shortness of breath 1/19/2017   • SOB (shortness of breath)    • Stroke    • Wears glasses        Allergies   Allergen Reactions   • Sulfa Antibiotics Anaphylaxis, Nausea And Vomiting and Delirium   • Codeine Nausea And Vomiting     Can only take with Phenergan   • Invokana [Canagliflozin]      DKA   • Morphine      Does not work. It causes pain       Past Surgical History:   Procedure Laterality Date   • BACK SURGERY     • CARDIAC CATHETERIZATION     • CARDIAC CATHETERIZATION N/A 8/11/2017    Procedure: Coronary angiography;  Surgeon: Dallas Kim MD;  Location: [facility] INVASIVE LOCATION;  Service:    • CARDIAC CATHETERIZATION N/A 8/11/2017    Procedure: Left Heart Cath;  Surgeon: Dallas Kim MD;  Location: [facility] INVASIVE LOCATION;  Service:    • CARDIAC CATHETERIZATION N/A 8/11/2017    Procedure: Left ventriculography;  Surgeon: Dallas Kim MD;  Location: [facility] INVASIVE LOCATION;  Service:    • CARDIOVASCULAR STRESS TEST  07/03/2017    WITH DR KIM AT [facility]   • CARPAL TUNNEL RELEASE Right    • CATARACT EXTRACTION W/ INTRAOCULAR LENS IMPLANT Right 6/12/2017    Procedure: CATARACT PHACO EXTRACTION WITH  INTRAOCULAR LENS IMPLANT RIGHT ;  Surgeon: Natalie Cao MD;  Location:  [facility];  Service:    • CATARACT EXTRACTION W/ INTRAOCULAR LENS IMPLANT Left 7/10/2017    Procedure: CATARACT PHACO EXTRACTION WITH INTRAOCULAR LENS IMPLANT LEFT;  Surgeon: Natalie Cao MD;  Location:  [facility];  Service:    • CHOLECYSTECTOMY     • COLONOSCOPY     • CORONARY ANGIOPLASTY WITH STENT PLACEMENT     • CORONARY ANGIOPLASTY WITH STENT PLACEMENT      X1-LAD   • CORONARY ARTERY BYPASS GRAFT N/A 8/15/2017    Procedure: CORONARY ARTERY BYPASS GRAFTING x 3 UTILIZING THE LEFT INTERNAL MAMMARY ARTERY WITH ENDOSCOPIC VEIN HARVESTING OF THE RIGHT GREATER SAPHENOUS VEIN, HAMLET, LAD ENDARECTOMY;  Surgeon: London Damon MD;  Location:  [facility];  Service:    • ENDOSCOPY     • EYE SURGERY      cataract surgery both eyes   • KNEE ARTHROSCOPY Bilateral    • NEUROPLASTY / TRANSPOSITION ULNAR NERVE AT ELBOW     • OTHER SURGICAL HISTORY      Foraminotomy and discectomy   • PERICARDIAL WINDOW N/A 8/25/2017    Procedure: PERICARDIAL WINDOW;  Surgeon: Freeman Phillips MD;  Location:  [facility];  Service:        Family History   Problem Relation Age of Onset   • Hypertension Mother    • Arthritis Mother    • Alcohol abuse Father    • Heart disease Other    • Stroke Other    • Hypertension Other    • Other Other      Neurologic disorder   • Parkinsonism Other    • Stroke Other    • Heart disease Other    • Hypertension Other    • Heart disease Other    • Stroke Other    • Hypertension Other        Social History     Social History   • Marital status:      Spouse name: N/A   • Number of children: 1   • Years of education: N/A     Occupational History   • [employer]      Disabled since 2002-Back Problems     Social History Main Topics   • Smoking status: Former Smoker     Packs/day: 1.00     Years: 10.00     Types: Cigarettes     Quit date: 1/1/1998   • Smokeless tobacco: Former User     Types: Snuff     Quit date: 5/24/2017   •  Alcohol use Yes      Comment: 3 PER YEAR   • Drug use: No   • Sexual activity: Defer     Other Topics Concern   • None     Social History Narrative    Caffeine use: 0 serving daily.    Patient lives at home with wife.                Objective   Physical Exam   Constitutional: He is oriented to person, place, and time. He appears well-developed and well-nourished. No distress.   HENT:   Head: Normocephalic and atraumatic.   Right Ear: External ear normal.   Left Ear: External ear normal.   Nose: Nose normal.   Mouth/Throat: Oropharynx is clear and moist. No oropharyngeal exudate.   Eyes: Conjunctivae and EOM are normal. Pupils are equal, round, and reactive to light. Right eye exhibits no discharge. Left eye exhibits no discharge. No scleral icterus.   Neck: Normal range of motion. Neck supple. No JVD present. No tracheal deviation present. No thyromegaly present.   Cardiovascular: Normal rate, regular rhythm and normal heart sounds.  Exam reveals no gallop and no friction rub.    No murmur heard.  Pulmonary/Chest: Effort normal and breath sounds normal. No stridor. No respiratory distress. He has no wheezes. He has no rales. He exhibits no tenderness.   Abdominal: Soft. Bowel sounds are normal. He exhibits no distension and no mass. There is no rebound and no guarding.   Musculoskeletal: Normal range of motion. He exhibits no edema, tenderness or deformity.   Neurological: He is alert and oriented to person, place, and time. No cranial nerve deficit. He exhibits normal muscle tone. Coordination normal.   Skin: Skin is warm and dry. No rash noted. He is not diaphoretic. No erythema. No pallor.   Psychiatric: He has a normal mood and affect. His behavior is normal. Judgment and thought content normal.   Nursing note and vitals reviewed.      Procedures        Recent Results (from the past 24 hour(s))   Comprehensive Metabolic Panel    Collection Time: 10/28/17  4:03 PM   Result Value Ref Range    Glucose 189 (H) 70 -  100 mg/dL    BUN 15 9 - 23 mg/dL    Creatinine 0.80 0.60 - 1.30 mg/dL    Sodium 140 132 - 146 mmol/L    Potassium 3.8 3.5 - 5.5 mmol/L    Chloride 104 99 - 109 mmol/L    CO2 24.0 20.0 - 31.0 mmol/L    Calcium 9.9 8.7 - 10.4 mg/dL    Total Protein 7.8 5.7 - 8.2 g/dL    Albumin 4.80 3.20 - 4.80 g/dL    ALT (SGPT) 34 7 - 40 U/L    AST (SGOT) 21 0 - 33 U/L    Alkaline Phosphatase 124 (H) 25 - 100 U/L    Total Bilirubin 0.7 0.3 - 1.2 mg/dL    eGFR Non African Amer 100 >60 mL/min/1.73    Globulin 3.0 gm/dL    A/G Ratio 1.6 1.5 - 2.5 g/dL    BUN/Creatinine Ratio 18.8 7.0 - 25.0    Anion Gap 12.0 (H) 3.0 - 11.0 mmol/L   BNP    Collection Time: 10/28/17  4:03 PM   Result Value Ref Range    BNP 17.0 0.0 - 100.0 pg/mL   Troponin    Collection Time: 10/28/17  4:03 PM   Result Value Ref Range    Troponin I <0.006 <=0.039 ng/mL   D-dimer, Quantitative    Collection Time: 10/28/17  4:03 PM   Result Value Ref Range    D-Dimer, Quantitative 3.93 (H) 0.00 - 0.50 mg/L (FEU)   CBC Auto Differential    Collection Time: 10/28/17  4:03 PM   Result Value Ref Range    WBC 10.96 (H) 3.50 - 10.80 10*3/mm3    RBC 5.63 4.20 - 5.76 10*6/mm3    Hemoglobin 14.2 13.1 - 17.5 g/dL    Hematocrit 43.5 38.9 - 50.9 %    MCV 77.3 (L) 80.0 - 99.0 fL    MCH 25.2 (L) 27.0 - 31.0 pg    MCHC 32.6 32.0 - 36.0 g/dL    RDW 14.7 (H) 11.3 - 14.5 %    RDW-SD 41.2 37.0 - 54.0 fl    MPV 11.0 6.0 - 12.0 fL    Platelets 223 150 - 450 10*3/mm3    Neutrophil % 69.8 41.0 - 71.0 %    Lymphocyte % 19.1 (L) 24.0 - 44.0 %    Monocyte % 7.6 0.0 - 12.0 %    Eosinophil % 2.9 0.0 - 3.0 %    Basophil % 0.4 0.0 - 1.0 %    Immature Grans % 0.2 0.0 - 0.6 %    Neutrophils, Absolute 7.66 1.50 - 8.30 10*3/mm3    Lymphocytes, Absolute 2.09 0.60 - 4.80 10*3/mm3    Monocytes, Absolute 0.83 0.00 - 1.00 10*3/mm3    Eosinophils, Absolute 0.32 (H) 0.00 - 0.30 10*3/mm3    Basophils, Absolute 0.04 0.00 - 0.20 10*3/mm3    Immature Grans, Absolute 0.02 0.00 - 0.03 10*3/mm3   CK    Collection Time:  10/28/17  4:03 PM   Result Value Ref Range    Creatine Kinase 62 26 - 174 U/L   Urinalysis With / Culture If Indicated - Urine, Clean Catch    Collection Time: 10/28/17  4:16 PM   Result Value Ref Range    Color, UA Yellow Yellow, Straw    Appearance, UA Clear Clear    pH, UA 5.5 5.0 - 8.0    Specific Gravity, UA 1.017 1.001 - 1.030    Glucose, UA Negative Negative    Ketones, UA Negative Negative    Bilirubin, UA Negative Negative    Blood, UA Negative Negative    Protein, UA Negative Negative    Leuk Esterase, UA Negative Negative    Nitrite, UA Negative Negative    Urobilinogen, UA 0.2 E.U./dL 0.2 - 1.0 E.U./dL   POC Troponin, Rapid    Collection Time: 10/28/17  6:42 PM   Result Value Ref Range    Troponin I 0.00 0.00 - 0.07 ng/mL     Note: In addition to lab results from this visit, the labs listed above may include labs taken at another facility or during a different encounter within the last 24 hours. Please correlate lab times with ED admission and discharge times for further clarification of the services performed during this visit.    CT Abdomen Pelvis With Contrast   Final Result   1. Small pericardial effusion as on previous exam and decreasing, small   right and left pleural effusions.   2. No evidence of acute intra-abdominal or intrapelvic disease.       DICTATED:     10/28/2017   EDITED:         10/28/2017                       This report was finalized on 10/29/2017 12:11 AM by DR. Jason Padilla MD.          XR Chest 2 View   Final Result   Minimal pleural effusions and minimal left basilar discoid   atelectasis. No other evidence of active chest disease.       DICTATED:     10/28/2017   EDITED:         10/28/2017           This report was finalized on 10/28/2017 11:21 PM by DR. Jason Padilla MD.          CT Angiogram Chest With & Without Contrast   Final Result      1.  No pulmonary embolism.      2.  Small bilateral pleural effusions, right larger than left, with associated    posterior atelectasis.       3.  Incidental/non-acute findings are described above.      THIS DOCUMENT HAS BEEN ELECTRONICALLY SIGNED BY KENNY MCKINNEY MD        Vitals:    10/28/17 1900 10/28/17 2000 10/28/17 2030 10/28/17 2246   BP: 126/64 130/68 126/68 126/75   BP Location:    Left arm   Patient Position:    Sitting   Pulse: 80   86   Resp:    16   Temp:    99.6 °F (37.6 °C)   TempSrc:    Oral   SpO2: 97% 99% 98% 96%   Weight:       Height:         Medications   iopamidol (ISOVUE-300) 61 % injection 100 mL (100 mL Intravenous Given 10/28/17 1706)   iopamidol (ISOVUE-370) 76 % injection 100 mL (50 mL Intravenous Given 10/28/17 1918)   azithromycin (ZITHROMAX) tablet 500 mg (500 mg Oral Given 10/28/17 2310)   HYDROcod Polst-CPM Polst ER (TUSSIONEX PENNKINETIC) 10-8 MG/5ML ER suspension 5 mL (5 mL Oral Given 10/28/17 2311)     ECG/EMG Results (last 24 hours)     Procedure Component Value Units Date/Time    ECG 12 Lead [[id number]] Collected:  10/28/17 1504     Updated:  10/28/17 1505    ECG 12 Lead [[id number]] Collected:  10/28/17 1840     Updated:  10/28/17 2209    Narrative:       Test Reason : 2nd set  Blood Pressure : **/** mmHG  Vent. Rate : 081 BPM     Atrial Rate : 081 BPM     P-R Int : 134 ms          QRS Dur : 098 ms      QT Int : 412 ms       P-R-T Axes : 028 039 057 degrees     QTc Int : 478 ms    Normal sinus rhythm  When compared with ECG of 28-OCT-2017 15:04, (Unconfirmed)  No significant change was found  Confirmed by JONNY CHANCE MD ([id number]) on 10/28/2017 10:09:14 PM    Referred By:  [facility]           Confirmed By:JONNY CHANCE MD            ED Course  ED Course   Comment By Time   Reviewed with Dr. Chance.  D-dimer elevation may be due to inflammation and healing process postthoracotomy.  Patient has small bilateral pleural effusions that may also be inflammatory and residual from thoracotomy.  We will treat with antibiotics, antitussives and, close observation and recheck with primary care provider in 2 days.  Patient advised to  return to emergency department immediately.  If symptoms change or worsen.  He voices understanding and is agreeable to plan. Marcio Sutton PA-C 10/28 0300                  [facility]    Final diagnoses:   Pleurodynia   Cough   Acute chest wall pain   Acute neck pain   Pleural effusion, bacterial            Marcio Sutton PA-C  10/29/17 0039

## 2017-10-29 NOTE — DISCHARGE INSTRUCTIONS
Follow-up with Dr. Pink on Monday for recheck.  Return to emergency department immediately if any change or worsening of your symptoms.

## 2017-10-30 ENCOUNTER — APPOINTMENT (OUTPATIENT)
Dept: CARDIAC REHAB | Facility: HOSPITAL | Age: 57
End: 2017-10-30

## 2017-11-01 ENCOUNTER — APPOINTMENT (OUTPATIENT)
Dept: CARDIAC REHAB | Facility: HOSPITAL | Age: 57
End: 2017-11-01

## 2017-11-03 ENCOUNTER — APPOINTMENT (OUTPATIENT)
Dept: CARDIAC REHAB | Facility: HOSPITAL | Age: 57
End: 2017-11-03

## 2017-11-06 ENCOUNTER — APPOINTMENT (OUTPATIENT)
Dept: CARDIAC REHAB | Facility: HOSPITAL | Age: 57
End: 2017-11-06

## 2017-11-08 ENCOUNTER — APPOINTMENT (OUTPATIENT)
Dept: CARDIAC REHAB | Facility: HOSPITAL | Age: 57
End: 2017-11-08

## 2017-11-10 ENCOUNTER — APPOINTMENT (OUTPATIENT)
Dept: CARDIAC REHAB | Facility: HOSPITAL | Age: 57
End: 2017-11-10

## 2017-11-13 ENCOUNTER — APPOINTMENT (OUTPATIENT)
Dept: CARDIAC REHAB | Facility: HOSPITAL | Age: 57
End: 2017-11-13

## 2017-11-15 ENCOUNTER — APPOINTMENT (OUTPATIENT)
Dept: CARDIAC REHAB | Facility: HOSPITAL | Age: 57
End: 2017-11-15

## 2017-11-17 ENCOUNTER — APPOINTMENT (OUTPATIENT)
Dept: CARDIAC REHAB | Facility: HOSPITAL | Age: 57
End: 2017-11-17

## 2017-11-20 ENCOUNTER — APPOINTMENT (OUTPATIENT)
Dept: CARDIAC REHAB | Facility: HOSPITAL | Age: 57
End: 2017-11-20

## 2017-11-21 RX ORDER — FLUNISOLIDE 0.25 MG/ML
SOLUTION NASAL
Refills: 0 | OUTPATIENT
Start: 2017-11-21

## 2017-11-21 NOTE — TELEPHONE ENCOUNTER
Patient hasn't been seen in over a year and was scheduled to return in 3 months. He needs to contact the office.

## 2017-11-22 ENCOUNTER — APPOINTMENT (OUTPATIENT)
Dept: CARDIAC REHAB | Facility: HOSPITAL | Age: 57
End: 2017-11-22

## 2017-11-24 ENCOUNTER — APPOINTMENT (OUTPATIENT)
Dept: CARDIAC REHAB | Facility: HOSPITAL | Age: 57
End: 2017-11-24

## 2017-11-26 ENCOUNTER — HOSPITAL ENCOUNTER (EMERGENCY)
Facility: HOSPITAL | Age: 57
Discharge: HOME | End: 2017-11-26
Attending: EMERGENCY MEDICINE | Admitting: EMERGENCY MEDICINE

## 2017-11-26 VITALS
TEMPERATURE: 98.3 F | BODY MASS INDEX: 36.51 KG/M2 | SYSTOLIC BLOOD PRESSURE: 140 MMHG | HEART RATE: 76 BPM | DIASTOLIC BLOOD PRESSURE: 85 MMHG | OXYGEN SATURATION: 98 % | RESPIRATION RATE: 18 BRPM | WEIGHT: 255 LBS | HEIGHT: 70 IN

## 2017-11-26 DIAGNOSIS — L50.9 URTICARIA: Primary | ICD-10-CM

## 2017-11-26 PROCEDURE — 99282 EMERGENCY DEPT VISIT SF MDM: CPT

## 2017-11-26 RX ORDER — HYDROXYZINE HYDROCHLORIDE 25 MG/1
50 TABLET, FILM COATED ORAL EVERY 6 HOURS PRN
Qty: 60 TABLET | Refills: 0 | Status: SHIPPED | OUTPATIENT
Start: 2017-11-26 | End: 2018-03-02

## 2017-11-26 RX ORDER — FAMOTIDINE 20 MG/1
20 TABLET, FILM COATED ORAL NIGHTLY
Qty: 30 TABLET | Refills: 0 | Status: SHIPPED | OUTPATIENT
Start: 2017-11-26 | End: 2018-05-15 | Stop reason: HOSPADM

## 2017-11-26 RX ORDER — PREDNISONE 20 MG/1
40 TABLET ORAL DAILY
Qty: 5 TABLET | Refills: 0 | Status: ON HOLD | OUTPATIENT
Start: 2017-11-26 | End: 2017-12-18

## 2017-11-27 ENCOUNTER — APPOINTMENT (OUTPATIENT)
Dept: CARDIAC REHAB | Facility: HOSPITAL | Age: 57
End: 2017-11-27

## 2017-11-29 ENCOUNTER — APPOINTMENT (OUTPATIENT)
Dept: CARDIAC REHAB | Facility: HOSPITAL | Age: 57
End: 2017-11-29

## 2017-12-01 ENCOUNTER — APPOINTMENT (OUTPATIENT)
Dept: CARDIAC REHAB | Facility: HOSPITAL | Age: 57
End: 2017-12-01

## 2017-12-04 ENCOUNTER — APPOINTMENT (OUTPATIENT)
Dept: CARDIAC REHAB | Facility: HOSPITAL | Age: 57
End: 2017-12-04

## 2017-12-06 ENCOUNTER — APPOINTMENT (OUTPATIENT)
Dept: CARDIAC REHAB | Facility: HOSPITAL | Age: 57
End: 2017-12-06

## 2017-12-08 ENCOUNTER — APPOINTMENT (OUTPATIENT)
Dept: CARDIAC REHAB | Facility: HOSPITAL | Age: 57
End: 2017-12-08

## 2017-12-11 ENCOUNTER — APPOINTMENT (OUTPATIENT)
Dept: CARDIAC REHAB | Facility: HOSPITAL | Age: 57
End: 2017-12-11

## 2017-12-13 ENCOUNTER — APPOINTMENT (OUTPATIENT)
Dept: CARDIAC REHAB | Facility: HOSPITAL | Age: 57
End: 2017-12-13

## 2017-12-15 ENCOUNTER — APPOINTMENT (OUTPATIENT)
Dept: CARDIAC REHAB | Facility: HOSPITAL | Age: 57
End: 2017-12-15

## 2017-12-17 ENCOUNTER — HOSPITAL ENCOUNTER (INPATIENT)
Facility: HOSPITAL | Age: 57
LOS: 5 days | Discharge: HOME | End: 2017-12-22
Attending: EMERGENCY MEDICINE | Admitting: INTERNAL MEDICINE

## 2017-12-17 ENCOUNTER — APPOINTMENT (OUTPATIENT)
Dept: GENERAL RADIOLOGY | Facility: HOSPITAL | Age: 57
End: 2017-12-17

## 2017-12-17 ENCOUNTER — APPOINTMENT (OUTPATIENT)
Dept: CT IMAGING | Facility: HOSPITAL | Age: 57
End: 2017-12-17

## 2017-12-17 DIAGNOSIS — R53.1 LEFT-SIDED WEAKNESS: Primary | ICD-10-CM

## 2017-12-17 DIAGNOSIS — Z74.09 IMPAIRED MOBILITY AND ADLS: ICD-10-CM

## 2017-12-17 DIAGNOSIS — Z78.9 IMPAIRED MOBILITY AND ADLS: ICD-10-CM

## 2017-12-17 DIAGNOSIS — R20.2 PARESTHESIA OF LEFT ARM AND LEG: ICD-10-CM

## 2017-12-17 DIAGNOSIS — Z74.09 IMPAIRED FUNCTIONAL MOBILITY, BALANCE, GAIT, AND ENDURANCE: ICD-10-CM

## 2017-12-17 PROBLEM — I63.9 CVA (CEREBRAL VASCULAR ACCIDENT) (HCC): Status: ACTIVE | Noted: 2017-12-17

## 2017-12-17 LAB
ALT SERPL W P-5'-P-CCNC: 30 U/L (ref 7–40)
APTT PPP: 25.4 SECONDS (ref 24–31)
AST SERPL-CCNC: 22 U/L (ref 0–33)
BASOPHILS # BLD AUTO: 0.04 10*3/MM3 (ref 0–0.2)
BASOPHILS NFR BLD AUTO: 0.5 % (ref 0–1)
BUN BLDA-MCNC: 18 MG/DL (ref 8–26)
CA-I BLDA-SCNC: 1.28 MMOL/L (ref 1.2–1.32)
CHLORIDE BLDA-SCNC: 97 MMOL/L (ref 98–109)
CO2 BLDA-SCNC: 25 MMOL/L (ref 24–29)
CREAT BLDA-MCNC: 1 MG/DL (ref 0.6–1.3)
DEPRECATED RDW RBC AUTO: 40.8 FL (ref 37–54)
EOSINOPHIL # BLD AUTO: 0.29 10*3/MM3 (ref 0–0.3)
EOSINOPHIL NFR BLD AUTO: 3.6 % (ref 0–3)
ERYTHROCYTE [DISTWIDTH] IN BLOOD BY AUTOMATED COUNT: 14.7 % (ref 11.3–14.5)
GLUCOSE BLDC GLUCOMTR-MCNC: 282 MG/DL (ref 70–130)
GLUCOSE BLDC GLUCOMTR-MCNC: 283 MG/DL (ref 70–130)
GLUCOSE BLDC GLUCOMTR-MCNC: 364 MG/DL (ref 70–130)
HCT VFR BLD AUTO: 41.3 % (ref 38.9–50.9)
HCT VFR BLDA CALC: 42 % (ref 38–51)
HGB BLD-MCNC: 13.9 G/DL (ref 13.1–17.5)
HGB BLDA-MCNC: 14.3 G/DL (ref 12–17)
HOLD SPECIMEN: NORMAL
HOLD SPECIMEN: NORMAL
IMM GRANULOCYTES # BLD: 0.07 10*3/MM3 (ref 0–0.03)
IMM GRANULOCYTES NFR BLD: 0.9 % (ref 0–0.6)
INR PPP: 1.1 (ref 0.8–1.2)
LYMPHOCYTES # BLD AUTO: 2.1 10*3/MM3 (ref 0.6–4.8)
LYMPHOCYTES NFR BLD AUTO: 26 % (ref 24–44)
MCH RBC QN AUTO: 25.5 PG (ref 27–31)
MCHC RBC AUTO-ENTMCNC: 33.7 G/DL (ref 32–36)
MCV RBC AUTO: 75.8 FL (ref 80–99)
MONOCYTES # BLD AUTO: 0.4 10*3/MM3 (ref 0–1)
MONOCYTES NFR BLD AUTO: 5 % (ref 0–12)
NEUTROPHILS # BLD AUTO: 5.17 10*3/MM3 (ref 1.5–8.3)
NEUTROPHILS NFR BLD AUTO: 64 % (ref 41–71)
PLATELET # BLD AUTO: 183 10*3/MM3 (ref 150–450)
PMV BLD AUTO: 10.5 FL (ref 6–12)
POTASSIUM BLDA-SCNC: 4 MMOL/L (ref 3.5–4.9)
PROTHROMBIN TIME: 13.1 SECONDS (ref 12.8–15.2)
RBC # BLD AUTO: 5.45 10*6/MM3 (ref 4.2–5.76)
SODIUM BLDA-SCNC: 138 MMOL/L (ref 138–146)
TROPONIN I SERPL-MCNC: 0 NG/ML (ref 0–0.07)
WBC NRBC COR # BLD: 8.07 10*3/MM3 (ref 3.5–10.8)
WHOLE BLOOD HOLD SPECIMEN: NORMAL
WHOLE BLOOD HOLD SPECIMEN: NORMAL

## 2017-12-17 PROCEDURE — 93005 ELECTROCARDIOGRAM TRACING: CPT | Performed by: EMERGENCY MEDICINE

## 2017-12-17 PROCEDURE — 63710000001 INSULIN DETEMIR PER 5 UNITS: Performed by: INTERNAL MEDICINE

## 2017-12-17 PROCEDURE — 0042T HC CT CEREBRAL PERFUSION W/WO CONTRAST: CPT

## 2017-12-17 PROCEDURE — 99223 1ST HOSP IP/OBS HIGH 75: CPT | Performed by: INTERNAL MEDICINE

## 2017-12-17 PROCEDURE — 84484 ASSAY OF TROPONIN QUANT: CPT

## 2017-12-17 PROCEDURE — 85014 HEMATOCRIT: CPT

## 2017-12-17 PROCEDURE — 63710000001 INSULIN LISPRO (HUMAN) PER 5 UNITS: Performed by: INTERNAL MEDICINE

## 2017-12-17 PROCEDURE — 85025 COMPLETE CBC W/AUTO DIFF WBC: CPT | Performed by: EMERGENCY MEDICINE

## 2017-12-17 PROCEDURE — 70496 CT ANGIOGRAPHY HEAD: CPT

## 2017-12-17 PROCEDURE — 99291 CRITICAL CARE FIRST HOUR: CPT

## 2017-12-17 PROCEDURE — 99221 1ST HOSP IP/OBS SF/LOW 40: CPT | Performed by: NURSE PRACTITIONER

## 2017-12-17 PROCEDURE — 84460 ALANINE AMINO (ALT) (SGPT): CPT | Performed by: EMERGENCY MEDICINE

## 2017-12-17 PROCEDURE — 70450 CT HEAD/BRAIN W/O DYE: CPT

## 2017-12-17 PROCEDURE — 85730 THROMBOPLASTIN TIME PARTIAL: CPT | Performed by: EMERGENCY MEDICINE

## 2017-12-17 PROCEDURE — 84450 TRANSFERASE (AST) (SGOT): CPT | Performed by: EMERGENCY MEDICINE

## 2017-12-17 PROCEDURE — 99223 1ST HOSP IP/OBS HIGH 75: CPT | Performed by: PSYCHIATRY & NEUROLOGY

## 2017-12-17 PROCEDURE — 70498 CT ANGIOGRAPHY NECK: CPT

## 2017-12-17 PROCEDURE — 0 IOPAMIDOL PER 1 ML: Performed by: EMERGENCY MEDICINE

## 2017-12-17 PROCEDURE — 85610 PROTHROMBIN TIME: CPT

## 2017-12-17 PROCEDURE — 80047 BASIC METABLC PNL IONIZED CA: CPT

## 2017-12-17 PROCEDURE — 3E03317 INTRODUCTION OF OTHER THROMBOLYTIC INTO PERIPHERAL VEIN, PERCUTANEOUS APPROACH: ICD-10-PCS | Performed by: EMERGENCY MEDICINE

## 2017-12-17 PROCEDURE — 25010000002 ALTEPLASE PER 1 MG: Performed by: EMERGENCY MEDICINE

## 2017-12-17 PROCEDURE — 82962 GLUCOSE BLOOD TEST: CPT

## 2017-12-17 PROCEDURE — 71010 HC CHEST PA OR AP: CPT

## 2017-12-17 RX ORDER — ASPIRIN 325 MG
325 TABLET ORAL DAILY
Status: DISCONTINUED | OUTPATIENT
Start: 2017-12-18 | End: 2017-12-22 | Stop reason: HOSPADM

## 2017-12-17 RX ORDER — ACETAMINOPHEN 650 MG/1
650 SUPPOSITORY RECTAL EVERY 4 HOURS PRN
Status: DISCONTINUED | OUTPATIENT
Start: 2017-12-17 | End: 2017-12-22 | Stop reason: HOSPADM

## 2017-12-17 RX ORDER — ASPIRIN 300 MG/1
300 SUPPOSITORY RECTAL DAILY
Status: DISCONTINUED | OUTPATIENT
Start: 2017-12-18 | End: 2017-12-20

## 2017-12-17 RX ORDER — ACETAMINOPHEN 325 MG/1
650 TABLET ORAL EVERY 4 HOURS PRN
Status: DISCONTINUED | OUTPATIENT
Start: 2017-12-17 | End: 2017-12-22 | Stop reason: HOSPADM

## 2017-12-17 RX ORDER — SODIUM CHLORIDE 0.9 % (FLUSH) 0.9 %
10 SYRINGE (ML) INJECTION AS NEEDED
Status: DISCONTINUED | OUTPATIENT
Start: 2017-12-17 | End: 2017-12-22

## 2017-12-17 RX ORDER — SODIUM CHLORIDE 9 MG/ML
100 INJECTION, SOLUTION INTRAVENOUS ONCE
Status: COMPLETED | OUTPATIENT
Start: 2017-12-17 | End: 2017-12-17

## 2017-12-17 RX ORDER — ONDANSETRON 2 MG/ML
4 INJECTION INTRAMUSCULAR; INTRAVENOUS EVERY 6 HOURS PRN
Status: DISCONTINUED | OUTPATIENT
Start: 2017-12-17 | End: 2017-12-22 | Stop reason: HOSPADM

## 2017-12-17 RX ORDER — DEXTROSE MONOHYDRATE 25 G/50ML
25 INJECTION, SOLUTION INTRAVENOUS
Status: DISCONTINUED | OUTPATIENT
Start: 2017-12-17 | End: 2017-12-22 | Stop reason: HOSPADM

## 2017-12-17 RX ORDER — NICOTINE POLACRILEX 4 MG
15 LOZENGE BUCCAL
Status: DISCONTINUED | OUTPATIENT
Start: 2017-12-17 | End: 2017-12-22 | Stop reason: HOSPADM

## 2017-12-17 RX ORDER — ATORVASTATIN CALCIUM 40 MG/1
80 TABLET, FILM COATED ORAL NIGHTLY
Status: DISCONTINUED | OUTPATIENT
Start: 2017-12-17 | End: 2017-12-22 | Stop reason: HOSPADM

## 2017-12-17 RX ORDER — SODIUM CHLORIDE 0.9 % (FLUSH) 0.9 %
1-10 SYRINGE (ML) INJECTION AS NEEDED
Status: DISCONTINUED | OUTPATIENT
Start: 2017-12-17 | End: 2017-12-22

## 2017-12-17 RX ADMIN — INSULIN DETEMIR 40 UNITS: 100 INJECTION, SOLUTION SUBCUTANEOUS at 20:34

## 2017-12-17 RX ADMIN — INSULIN LISPRO 12 UNITS: 100 INJECTION, SOLUTION INTRAVENOUS; SUBCUTANEOUS at 20:35

## 2017-12-17 RX ADMIN — SODIUM CHLORIDE 100 ML/HR: 9 INJECTION, SOLUTION INTRAVENOUS at 15:30

## 2017-12-17 RX ADMIN — ALTEPLASE 9 MG: KIT at 14:23

## 2017-12-17 RX ADMIN — ALTEPLASE 80.97 MG: KIT at 14:24

## 2017-12-17 RX ADMIN — IOPAMIDOL 115 ML: 755 INJECTION, SOLUTION INTRAVENOUS at 14:42

## 2017-12-17 RX ADMIN — ACETAMINOPHEN 650 MG: 325 TABLET ORAL at 18:49

## 2017-12-17 RX ADMIN — ATORVASTATIN CALCIUM 80 MG: 40 TABLET, FILM COATED ORAL at 20:03

## 2017-12-17 NOTE — CONSULTS
"NAME: MIKY HASSAN  : 1960  PCP: Ottoniel Toledo MD  Attending MD: Peter Perez, *    Date of Admission:  2017  Date of Service: 2017     CC:  Weakness and slurred speech    History of Present Illness:  57 y.o.  male male who presents to the ED with c/o left sided weakness. He reports that around 1100 today he was sitting in [place] when he began to feel \"wonky in the head\" before developing left sided weakness. He currently also complains of left leg numbness, and some slowed and slurred speech. His family reports they noticed Mr. Hassan spacing out during [employer] earlier today but Mr. Hassan refused to go to the hospital. However, during lunch they noticed that Mr. Hassan had some facial droop, unequal , and slurred speech so they brought him into the ED. They state that these sx are similar to the sx he had with his stroke 3 years ago. He has a hx of a triple bypass in August of this year. No other acute complaints at this time.  He has received alteplase.         Review of Systems   Neurological: Positive for facial asymmetry, speech difficulty (Slowed and slurred speech), weakness (Left sided weakness) and numbness.   All other systems reviewed and are negative.    Past Medical History:  Past Medical History:   Diagnosis Date   • Anxiety    • Arthritis    • Asthma    • BiPAP (biphasic positive airway pressure) dependence    • Brachial neuritis 2017   • Chest pain    • COPD (chronic obstructive pulmonary disease)    • Depression    • Diabetes mellitus    • Dizziness    • Edema    • GERD (gastroesophageal reflux disease)    • H/O chest x-ray 2015    Mild Left base atelectasis   • H/O echocardiogram 2015    Normal LVSF. EF of 60-65%. Grade 1 diastolic dysfunction of the LV myocardium. No evidence of pericardial effusion   • H/O exercise stress test    • History of herniated intervertebral disc     History of left L5-S1 disc herniation   • LOM (loss of memory) " 1/19/2017   • Lower back pain     Right   • Measles    • Obstructive sleep apnea    • Palpitations    • Pericardial effusion    • Seizure disorder    • Shortness of breath 1/19/2017   • SOB (shortness of breath)    • Stroke    • Wears glasses        Past Surgical History:  Past Surgical History:   Procedure Laterality Date   • BACK SURGERY     • CARDIAC CATHETERIZATION     • CARDIAC CATHETERIZATION N/A 8/11/2017    Procedure: Coronary angiography;  Surgeon: Dallas Kim MD;  Location: [facility] CATH INVASIVE LOCATION;  Service:    • CARDIAC CATHETERIZATION N/A 8/11/2017    Procedure: Left Heart Cath;  Surgeon: Dallas Kim MD;  Location: [facility] CATH INVASIVE LOCATION;  Service:    • CARDIAC CATHETERIZATION N/A 8/11/2017    Procedure: Left ventriculography;  Surgeon: Dallas Kim MD;  Location: [facility] CATH INVASIVE LOCATION;  Service:    • CARDIOVASCULAR STRESS TEST  07/03/2017    WITH DR KIM AT [facility]   • CARPAL TUNNEL RELEASE Right    • CATARACT EXTRACTION W/ INTRAOCULAR LENS IMPLANT Right 6/12/2017    Procedure: CATARACT PHACO EXTRACTION WITH INTRAOCULAR LENS IMPLANT RIGHT ;  Surgeon: Natalie Cao MD;  Location: [facility] OR;  Service:    • CATARACT EXTRACTION W/ INTRAOCULAR LENS IMPLANT Left 7/10/2017    Procedure: CATARACT PHACO EXTRACTION WITH INTRAOCULAR LENS IMPLANT LEFT;  Surgeon: Natalie Cao MD;  Location: [facility] OR;  Service:    • CHOLECYSTECTOMY     • COLONOSCOPY     • CORONARY ANGIOPLASTY WITH STENT PLACEMENT     • CORONARY ANGIOPLASTY WITH STENT PLACEMENT      X1-LAD   • CORONARY ARTERY BYPASS GRAFT N/A 8/15/2017    Procedure: CORONARY ARTERY BYPASS GRAFTING x 3 UTILIZING THE LEFT INTERNAL MAMMARY ARTERY WITH ENDOSCOPIC VEIN HARVESTING OF THE RIGHT GREATER SAPHENOUS VEIN, HAMLET, LAD ENDARECTOMY;  Surgeon: London Damon MD;  Location: [facility] OR;  Service:    • ENDOSCOPY     • EYE SURGERY      cataract surgery both eyes   • KNEE ARTHROSCOPY Bilateral    • NEUROPLASTY / TRANSPOSITION ULNAR  NERVE AT ELBOW     • OTHER SURGICAL HISTORY      Foraminotomy and discectomy   • PERICARDIAL WINDOW N/A 8/25/2017    Procedure: PERICARDIAL WINDOW;  Surgeon: Freeman Phillips MD;  Location: [facility] OR;  Service:          Medications    (Not in a hospital admission)    Allergies:  Allergies   Allergen Reactions   • Sulfa Antibiotics Anaphylaxis, Nausea And Vomiting and Delirium   • Codeine Nausea And Vomiting     Can only take with Phenergan   • Invokana [Canagliflozin]      DKA   • Morphine      Does not work. It causes pain       Social Hx:  Social History     Social History   • Marital status:      Spouse name: N/A   • Number of children: 1   • Years of education: N/A     Occupational History   • [employer]      Disabled since 2002-Back Problems     Social History Main Topics   • Smoking status: Former Smoker     Packs/day: 1.00     Years: 10.00     Types: Cigarettes     Quit date: 1/1/1998   • Smokeless tobacco: Former User     Types: Snuff     Quit date: 5/24/2017   • Alcohol use Yes      Comment: 3 PER YEAR   • Drug use: No   • Sexual activity: Defer     Other Topics Concern   • Not on file     Social History Narrative    Caffeine use: 0 serving daily.    Patient lives at home with wife.            Family Hx:  Family History   Problem Relation Age of Onset   • Hypertension Mother    • Arthritis Mother    • Alcohol abuse Father    • Heart disease Other    • Stroke Other    • Hypertension Other    • Other Other      Neurologic disorder   • Parkinsonism Other    • Stroke Other    • Heart disease Other    • Hypertension Other    • Heart disease Other    • Stroke Other    • Hypertension Other        Review of Imaging:  Ct Head Without Contrast    Result Date: 12/17/2017  EXAMINATION: CT HEAD WO CONTRAST - 12/17/2017  INDICATION: Code 19.  TECHNIQUE: CT scan of the head was performed at 5 mm intervals. No intravenous contrast was utilized.  The radiation dose reduction device was turned on for  each scan per the ALARA (As Low as Reasonably Achievable) protocol.  COMPARISON: MR of the brain dated 4/11/2017 (no acute findings).  FINDINGS: There is no intracranial mass. There is no hemorrhage. There is no midline shift or extra-axial fluid collection.      Normal unenhanced CT scan of the head.  TIME OF  EXAM:  1353 HOURS REPORT TO ER:  1408 HOURS  DICTATED:     12/17/2017 EDITED:          12/17/2017   This report was finalized on 12/17/2017 2:26 PM by Dr. South Gonzalez MD.      CT of Perfusion:  Negative for LVO      Laboratory Result:  Lab Results   Component Value Date    WBC 8.07 12/17/2017    HGB 13.9 12/17/2017    HCT 41.3 12/17/2017    MCV 75.8 (L) 12/17/2017     12/17/2017     Lab Results   Component Value Date    GLUCOSE 189 (H) 10/28/2017    CALCIUM 9.9 10/28/2017     10/28/2017    K 3.8 10/28/2017    CO2 24.0 10/28/2017     10/28/2017    BUN 15 10/28/2017    CREATININE 0.80 10/28/2017    EGFRIFNONA 100 10/28/2017    BCR 18.8 10/28/2017    ANIONGAP 12.0 (H) 10/28/2017     Lab Results   Component Value Date    HGBA1C 8.90 (H) 08/11/2017     Lab Results   Component Value Date    CHOL 122 08/12/2017     Lab Results   Component Value Date    TRIG 165 (H) 08/12/2017     Lab Results   Component Value Date    HDL 29 (L) 08/12/2017     No results found for: LDLCALC  No results found for: LDL  No results found for: HDLLDLRATIO  No components found for: CHOLHDL    Physical Examination:  Vitals:    12/17/17 1543   BP: 125/83   Pulse: 75   Resp:    Temp:    SpO2: 99%        General Appearance:   Well developed, well nourished, well groomed, alert, and cooperative.  Cardiovascular: Regular rate and rhythm. No carotid bruits    Neurological examination:  Patient has a slight left facial droop noted, speech is slightly slurred but understandable.   He has no drift noted upon examination with strength 5/5 in all extremities.  He does state a diminished sensation on the left.           Diagnoses/Plan:    Mr. Harding is a 57 y.o. male who met criteria for alteplase administration and therefore he has received this treatment.  All the risks and benefits were discussed with he and his family and they are in agreement.  He will need the stroke order set and an MRI.  He was not a candidate for neurointervention as his CT Perfusion of the head was negative for LVO.  We will continue to monitor and follow.

## 2017-12-17 NOTE — CONSULTS
Referring Provider: Dr Chance    Reason for Consultation: left side weakness    Patient Care Team:  Ottoniel Toledo MD as PCP - General (Family Medicine)    Chief complaint left side weakness and slurred speech    Subjective .     History of present illness:  58 yo RH man with multiple medical problems including coronary artery disease status post stenting and more recently CABG in August, diabetes, hypertension, hyperlipidemia and reported stroke about 3 years ago, was in his usual health today when he went to [employer], but while sitting noted his head felt funny around 11 and he noted after that difficulty with speech root was slurred and he had some difficulty getting words out.  He could comprehend others.  He then noted his left arm and leg were somewhat weak and he felt his left face was drooping.  He noted no changes in vision and no headache. His family reported they noticed Mr. Harding spacing out during [employer] earlier today but Mr. Harding refused to go to the hospital. However, during lunch they noticed that Mr. Harding had some facial droop, unequal , and slurred speech so they brought him into the ED.  In the ED, his NIH SS score was 4, but significant left leg weakness was noted and decision was made to pursue tPA as he had no contraindications.  At the time of my exam he had received bolus and 30 minutes of infusion and reports left side does feel stronger although not back to baseline.  He cannot remember what symptoms he had from stroke 3 years ago except that was weakness on one side and that he recovered.  He presented with generalized weakness and speech difficulty in April 2016, at which time MRI was negative for stroke.  He is followed by Dr. Shoemaker for migraine and diabetic neuropathy.  Cannot recall if he has ever had symptoms like this before, but apparently not recently at least.  No chest pain or palpitations.    Review of Systems  Pertinent items are noted in HPI, all other systems  reviewed and negative    History  Past Medical History:   Diagnosis Date   • Anxiety    • Arthritis    • Asthma    • BiPAP (biphasic positive airway pressure) dependence    • Brachial neuritis 1/19/2017   • Chest pain    • COPD (chronic obstructive pulmonary disease)    • Depression    • Diabetes mellitus    • Dizziness    • Edema    • GERD (gastroesophageal reflux disease)    • H/O chest x-ray 07/04/2015    Mild Left base atelectasis   • H/O echocardiogram 07/05/2015    Normal LVSF. EF of 60-65%. Grade 1 diastolic dysfunction of the LV myocardium. No evidence of pericardial effusion   • H/O exercise stress test    • History of herniated intervertebral disc     History of left L5-S1 disc herniation   • LOM (loss of memory) 1/19/2017   • Lower back pain     Right   • Measles    • Obstructive sleep apnea    • Palpitations    • Pericardial effusion    • Seizure disorder    • Shortness of breath 1/19/2017   • SOB (shortness of breath)    • Stroke    • Wears glasses    ,   Past Surgical History:   Procedure Laterality Date   • BACK SURGERY     • CARDIAC CATHETERIZATION     • CARDIAC CATHETERIZATION N/A 8/11/2017    Procedure: Coronary angiography;  Surgeon: Dallas Kim MD;  Location: [facility] INVASIVE LOCATION;  Service:    • CARDIAC CATHETERIZATION N/A 8/11/2017    Procedure: Left Heart Cath;  Surgeon: Dallas Kim MD;  Location: [facility] CATH INVASIVE LOCATION;  Service:    • CARDIAC CATHETERIZATION N/A 8/11/2017    Procedure: Left ventriculography;  Surgeon: Dallas Kim MD;  Location: [facility] INVASIVE LOCATION;  Service:    • CARDIOVASCULAR STRESS TEST  07/03/2017    WITH DR KIM AT [facility]   • CARPAL TUNNEL RELEASE Right    • CATARACT EXTRACTION W/ INTRAOCULAR LENS IMPLANT Right 6/12/2017    Procedure: CATARACT PHACO EXTRACTION WITH INTRAOCULAR LENS IMPLANT RIGHT ;  Surgeon: Natalie Cao MD;  Location: [facility];  Service:    • CATARACT EXTRACTION W/ INTRAOCULAR LENS IMPLANT Left 7/10/2017     Procedure: CATARACT PHACO EXTRACTION WITH INTRAOCULAR LENS IMPLANT LEFT;  Surgeon: Natalie Cao MD;  Location:  [facility] OR;  Service:    • CHOLECYSTECTOMY     • COLONOSCOPY     • CORONARY ANGIOPLASTY WITH STENT PLACEMENT     • CORONARY ANGIOPLASTY WITH STENT PLACEMENT      X1-LAD   • CORONARY ARTERY BYPASS GRAFT N/A 8/15/2017    Procedure: CORONARY ARTERY BYPASS GRAFTING x 3 UTILIZING THE LEFT INTERNAL MAMMARY ARTERY WITH ENDOSCOPIC VEIN HARVESTING OF THE RIGHT GREATER SAPHENOUS VEIN, HAMLET, LAD ENDARECTOMY;  Surgeon: London Damon MD;  Location:  [facility] OR;  Service:    • ENDOSCOPY     • EYE SURGERY      cataract surgery both eyes   • KNEE ARTHROSCOPY Bilateral    • NEUROPLASTY / TRANSPOSITION ULNAR NERVE AT ELBOW     • OTHER SURGICAL HISTORY      Foraminotomy and discectomy   • PERICARDIAL WINDOW N/A 8/25/2017    Procedure: PERICARDIAL WINDOW;  Surgeon: Freeman Phillips MD;  Location:  [facility] OR;  Service:    ,   Family History   Problem Relation Age of Onset   • Hypertension Mother    • Arthritis Mother    • Alcohol abuse Father    • Heart disease Other    • Stroke Other    • Hypertension Other    • Other Other      Neurologic disorder   • Parkinsonism Other    • Stroke Other    • Heart disease Other    • Hypertension Other    • Heart disease Other    • Stroke Other    • Hypertension Other    ,   Social History   Substance Use Topics   • Smoking status: Former Smoker     Packs/day: 1.00     Years: 10.00     Types: Cigarettes     Quit date: 1/1/1998   • Smokeless tobacco: Former User     Types: Snuff     Quit date: 5/24/2017   • Alcohol use Yes      Comment: 3 PER YEAR   ,   (Not in a hospital admission), Scheduled Meds:    alteplase 0.698 mg/kg Intravenous Once   sodium chloride 100 mL/hr Intravenous Once   , Continuous Infusions:   , PRN Meds:  sodium chloride and Allergies:  Sulfa antibiotics; Codeine; Invokana [canagliflozin]; and Morphine    Objective     Vital Signs   Blood pressure 138/86, pulse 77, temperature  "99.3 °F (37.4 °C), temperature source Oral, resp. rate 18, height 177.8 cm (70\"), weight 116 kg (255 lb), SpO2 98 %.    Physical Exam:   Middle-aged well-developed, well-nourished white man in no acute distress lying in the ED currently, with normal language, attention, memory and fund of knowledge at this time.  He has no dysarthria.  Pupils 2.5 mm and reactive, disks poorly visualized, visual fields full to confrontation, extraocular movements intact, no facial asymmetry with grimace initially then diminished grimace on the left which recovers and he can puff his cheeks.  He reports light touch is less on the left face (and arm and leg), hearing syntactically voice, palate and shoulders elevate symmetrically and tongue protrudes midline  Motor: Left upper extremity is 5 out of 5 no drift, left lower extremity 4/5 on manual muscle testing but heel-knee-shin is normal bilaterally, as is finger-nose-finger  Sensory he has diminished vibration to the knees bilaterally and diminished light touch in left upper and lower extremities above  Tone is normal, reflexes 1-2+ and symmetric in the upper extremities, trace to absent at the knees and not obtained at the ankles, no Babinski or pathological reflex  Gait is not tested  Neck supple, no carotid bruit appreciated  Heart RRR no murmur appreciated  Abdomen soft, obese, NT, ND with positive bowel sounds    Results Review:   I reviewed the patient's new clinical results.  I reviewed the patient's new imaging results and agree with the interpretation.  I reviewed the patient's other test results and agree with the interpretation  Head CT personally reviewed, agree unremarkable  CBC shows normal white count H&H and platelets, PTT normal, AST and ALT normal, rest pending    Assessment/Plan       Left side weakness affecting left lower extremity primarily as well as transient slurred speech concerning for stroke in patient with risk factors for stroke including coronary artery " disease, previous stroke, diabetes, hypertension and hyperlipidemia -agree with tPA given left leg weakness on presentation would result in significant debility.  He is apparently improving midway through infusion.  Risks of the treatment discussed with patient, as well as plan for ICU monitoring and testing.  Currently on aspirin and Lipitor 40 mg, we'll plan on high-dose statin.    I discussed the patients findings and my recommendations with patient, family and consulting provider    Leticia Pena MD  12/17/17  2:56 PM

## 2017-12-17 NOTE — ED PROVIDER NOTES
"Subjective   HPI Comments: Mr. Denzel Harding is a 57 y.o. male who presents to the ED with c/o left sided weakness. He reports that around 1100 today he was sitting in [employer] when he began to feel \"wonky in the head\" before developing left sided weakness. He currently also complains of left leg numbness, and some slowed and slurred speech. His family reports they noticed Mr. Harding spacing out during [employer]ikh earlier today but Mr. Harding refused to go to the hospital. However, during lunch they noticed that Mr. Harding had some facial droop, unequal , and slurred speech so they brought him into the ED. They state that these sx are similar to the sx he had with his stroke 3 years ago. He has a hx of a triple bypass in August of this year. No other acute complaints at this time.    Patient is a 57 y.o. male presenting with weakness.   History provided by:  Patient  Weakness - Generalized   Severity:  Moderate  Onset quality:  Sudden  Duration:  3 hours  Timing:  Constant  Progression:  Unchanged  Chronicity:  New  Relieved by:  None tried  Ineffective treatments:  None tried      Review of Systems   Neurological: Positive for facial asymmetry, speech difficulty (Slowed and slurred speech), weakness (Left sided weakness) and numbness.   All other systems reviewed and are negative.      Past Medical History:   Diagnosis Date   • Anxiety    • Arthritis    • Asthma    • BiPAP (biphasic positive airway pressure) dependence    • Brachial neuritis 1/19/2017   • Chest pain    • COPD (chronic obstructive pulmonary disease)    • Depression    • Diabetes mellitus    • Dizziness    • Edema    • GERD (gastroesophageal reflux disease)    • H/O chest x-ray 07/04/2015    Mild Left base atelectasis   • H/O echocardiogram 07/05/2015    Normal LVSF. EF of 60-65%. Grade 1 diastolic dysfunction of the LV myocardium. No evidence of pericardial effusion   • H/O exercise stress test    • History of herniated intervertebral disc     " History of left L5-S1 disc herniation   • LOM (loss of memory) 1/19/2017   • Lower back pain     Right   • Measles    • Obstructive sleep apnea    • Palpitations    • Pericardial effusion    • Seizure disorder    • Shortness of breath 1/19/2017   • SOB (shortness of breath)    • Stroke    • Wears glasses        Allergies   Allergen Reactions   • Sulfa Antibiotics Anaphylaxis, Nausea And Vomiting and Delirium   • Codeine Nausea And Vomiting     Can only take with Phenergan   • Invokana [Canagliflozin]      DKA   • Morphine      Does not work. It causes pain       Past Surgical History:   Procedure Laterality Date   • BACK SURGERY     • CARDIAC CATHETERIZATION     • CARDIAC CATHETERIZATION N/A 8/11/2017    Procedure: Coronary angiography;  Surgeon: Dallas Kim MD;  Location: [facility] CATH INVASIVE LOCATION;  Service:    • CARDIAC CATHETERIZATION N/A 8/11/2017    Procedure: Left Heart Cath;  Surgeon: Dallas Kim MD;  Location: [facility] CATH INVASIVE LOCATION;  Service:    • CARDIAC CATHETERIZATION N/A 8/11/2017    Procedure: Left ventriculography;  Surgeon: Dallas Kim MD;  Location: [facility] CATH INVASIVE LOCATION;  Service:    • CARDIOVASCULAR STRESS TEST  07/03/2017    WITH DR KIM AT [facility]   • CARPAL TUNNEL RELEASE Right    • CATARACT EXTRACTION W/ INTRAOCULAR LENS IMPLANT Right 6/12/2017    Procedure: CATARACT PHACO EXTRACTION WITH INTRAOCULAR LENS IMPLANT RIGHT ;  Surgeon: Natalie Cao MD;  Location: [facility];  Service:    • CATARACT EXTRACTION W/ INTRAOCULAR LENS IMPLANT Left 7/10/2017    Procedure: CATARACT PHACO EXTRACTION WITH INTRAOCULAR LENS IMPLANT LEFT;  Surgeon: Natalie Cao MD;  Location: [facility];  Service:    • CHOLECYSTECTOMY     • COLONOSCOPY     • CORONARY ANGIOPLASTY WITH STENT PLACEMENT     • CORONARY ANGIOPLASTY WITH STENT PLACEMENT      X1-LAD   • CORONARY ARTERY BYPASS GRAFT N/A 8/15/2017    Procedure: CORONARY ARTERY BYPASS GRAFTING x 3 UTILIZING THE LEFT INTERNAL MAMMARY  ARTERY WITH ENDOSCOPIC VEIN HARVESTING OF THE RIGHT GREATER SAPHENOUS VEIN, HAMLET, LAD ENDARECTOMY;  Surgeon: London Damon MD;  Location:  [facility];  Service:    • ENDOSCOPY     • EYE SURGERY      cataract surgery both eyes   • KNEE ARTHROSCOPY Bilateral    • NEUROPLASTY / TRANSPOSITION ULNAR NERVE AT ELBOW     • OTHER SURGICAL HISTORY      Foraminotomy and discectomy   • PERICARDIAL WINDOW N/A 8/25/2017    Procedure: PERICARDIAL WINDOW;  Surgeon: Freeman Phillips MD;  Location:  [facility];  Service:        Family History   Problem Relation Age of Onset   • Hypertension Mother    • Arthritis Mother    • Alcohol abuse Father    • Heart disease Other    • Stroke Other    • Hypertension Other    • Other Other      Neurologic disorder   • Parkinsonism Other    • Stroke Other    • Heart disease Other    • Hypertension Other    • Heart disease Other    • Stroke Other    • Hypertension Other        Social History     Social History   • Marital status:      Spouse name: N/A   • Number of children: 1   • Years of education: N/A     Occupational History   • [employer]      Disabled since 2002-Back Problems     Social History Main Topics   • Smoking status: Former Smoker     Packs/day: 1.00     Years: 10.00     Types: Cigarettes     Quit date: 1/1/1998   • Smokeless tobacco: Former User     Types: Snuff     Quit date: 5/24/2017   • Alcohol use Yes      Comment: 3 PER YEAR   • Drug use: No   • Sexual activity: Defer     Other Topics Concern   • None     Social History Narrative    Caffeine use: 0 serving daily.    Patient lives at home with wife.              Objective   Physical Exam   Constitutional: He is oriented to person, place, and time. He appears well-developed and well-nourished. No distress.   HENT:   Head: Normocephalic and atraumatic.   Nose: Nose normal.   Eyes: Conjunctivae are normal. No scleral icterus.   Neck: Normal range of motion. Neck supple. Carotid bruit is not present.    Cardiovascular: Normal rate, regular rhythm and normal heart sounds.    No murmur heard.  Pulmonary/Chest: Effort normal and breath sounds normal. No respiratory distress.   Abdominal: Soft. There is no tenderness.   Musculoskeletal: Normal range of motion.   Neurological: He is alert and oriented to person, place, and time.   Patient has mildly decreased fine touch sensation in his left face, arm, and leg. He also has a mild left pronator drift although his bilateral  are equal. He has no leg drift. However, he has decreased strength in his LLE compared to his RLE. Cranial nerves are intact with the exception of a slight left sided facial droop.   Skin: Skin is warm and dry. He is not diaphoretic.   Psychiatric: He has a normal mood and affect. His behavior is normal.   Nursing note and vitals reviewed.      Critical Care  Performed by: JONNY GOLDSMITH  Authorized by: JONNY GOLDSMITH     Critical care provider statement:     Critical care time (minutes):  35    Critical care time was exclusive of:  Separately billable procedures and treating other patients    Critical care was necessary to treat or prevent imminent or life-threatening deterioration of the following conditions:  CNS failure or compromise    Critical care was time spent personally by me on the following activities:  Blood draw for specimens, development of treatment plan with patient or surrogate, ordering and performing treatments and interventions, ordering and review of laboratory studies, ordering and review of radiographic studies, pulse oximetry, re-evaluation of patient's condition, review of old charts, obtaining history from patient or surrogate, examination of patient, evaluation of patient's response to treatment and discussions with consultants  Comments:      Attended to patient upon arrival. Assessed the patient and order appropriate labs and imagine, as well as TPA.      TPA was ordered and administered under my  supervision.      ED Course  ED Course   Comment By Time   I paged Dr. Pena, on-call for code 19. -RONALD Almontea GRACE Ronny 12/17 5201   Discussed the case with Dr. ePrez, intensivist, who will admit. -RONALD EDWARDS Ronny 12/17 7105     Recent Results (from the past 24 hour(s))   POC Protime / INR    Collection Time: 12/17/17  2:15 PM   Result Value Ref Range    Protime 13.1 12.8 - 15.2 seconds    INR 1.1 0.8 - 1.2   aPTT    Collection Time: 12/17/17  2:17 PM   Result Value Ref Range    PTT 25.4 24.0 - 31.0 seconds   AST    Collection Time: 12/17/17  2:17 PM   Result Value Ref Range    AST (SGOT) 22 0 - 33 U/L   ALT    Collection Time: 12/17/17  2:17 PM   Result Value Ref Range    ALT (SGPT) 30 7 - 40 U/L   CBC Auto Differential    Collection Time: 12/17/17  2:17 PM   Result Value Ref Range    WBC 8.07 3.50 - 10.80 10*3/mm3    RBC 5.45 4.20 - 5.76 10*6/mm3    Hemoglobin 13.9 13.1 - 17.5 g/dL    Hematocrit 41.3 38.9 - 50.9 %    MCV 75.8 (L) 80.0 - 99.0 fL    MCH 25.5 (L) 27.0 - 31.0 pg    MCHC 33.7 32.0 - 36.0 g/dL    RDW 14.7 (H) 11.3 - 14.5 %    RDW-SD 40.8 37.0 - 54.0 fl    MPV 10.5 6.0 - 12.0 fL    Platelets 183 150 - 450 10*3/mm3    Neutrophil % 64.0 41.0 - 71.0 %    Lymphocyte % 26.0 24.0 - 44.0 %    Monocyte % 5.0 0.0 - 12.0 %    Eosinophil % 3.6 (H) 0.0 - 3.0 %    Basophil % 0.5 0.0 - 1.0 %    Immature Grans % 0.9 (H) 0.0 - 0.6 %    Neutrophils, Absolute 5.17 1.50 - 8.30 10*3/mm3    Lymphocytes, Absolute 2.10 0.60 - 4.80 10*3/mm3    Monocytes, Absolute 0.40 0.00 - 1.00 10*3/mm3    Eosinophils, Absolute 0.29 0.00 - 0.30 10*3/mm3    Basophils, Absolute 0.04 0.00 - 0.20 10*3/mm3    Immature Grans, Absolute 0.07 (H) 0.00 - 0.03 10*3/mm3   POC Troponin, Rapid    Collection Time: 12/17/17  2:23 PM   Result Value Ref Range    Troponin I 0.00 0.00 - 0.07 ng/mL     Note: In addition to lab results from this visit, the labs listed above may include labs taken at another facility or during a different encounter  within the last 24 hours. Please correlate lab times with ED admission and discharge times for further clarification of the services performed during this visit.    CT Head Without Contrast   Final Result   Normal unenhanced CT scan of the head.       TIME OF  EXAM:  1353 HOURS   REPORT TO ER:  1408 HOURS       DICTATED:     12/17/2017   EDITED:          12/17/2017           This report was finalized on 12/17/2017 2:26 PM by Dr. South Gonzalez MD.          XR Chest 1 View    (Results Pending)   CT Cerebral Perfusion With & Without Contrast    (Results Pending)   CT Angiogram Neck With & Without Contrast    (Results Pending)   CT Angiogram Head With & Without Contrast    (Results Pending)     Vitals:    12/17/17 1417 12/17/17 1432 12/17/17 1446 12/17/17 1500   BP:  143/69 138/86 127/65   BP Location:       Patient Position:       Pulse:  85 77 80   Resp:       Temp: 99.3 °F (37.4 °C)      TempSrc: Oral      SpO2:  98% 98% 97%   Weight:       Height:         Medications   sodium chloride 0.9 % flush 10 mL (not administered)   alteplase (ACTIVASE) 100 mg kit (80.97 mg Intravenous New Bag 12/17/17 1424)   sodium chloride 0.9 % infusion (not administered)   alteplase (ACTIVASE) bolus from vial (9 mg Intravenous Given 12/17/17 1423)   iopamidol (ISOVUE-370) 76 % injection 115 mL (115 mL Intravenous Given 12/17/17 1442)     ECG/EMG Results (last 24 hours)     Procedure Component Value Units Date/Time    ECG 12 Lead [[id number]] Collected:  12/17/17 1449     Updated:  12/17/17 1450        ECG/EMG Results (last 24 hours)     Procedure Component Value Units Date/Time    ECG 12 Lead [[id number]] Collected:  12/17/17 1449     Updated:  12/18/17 0703         NIHSS (NIH Stroke Scale/Score) reviewed and/or performed as part of the patient evaluation and treatment planning process.  The result associated with this review/performance is: 4           MDM    Final diagnoses:   Left-sided weakness   Paresthesia of left arm and leg        Documentation assistance provided by wen Jefferson.  Information recorded by the [email or web address] was done at my direction and has been verified and validated by me.     America Jefferson  12/17/17 1446       America Jefferson  12/17/17 1500       Ernesto Chance MD  12/18/17 6270

## 2017-12-17 NOTE — H&P
Critical Care History & Physical    Patient name: Denzel Harding  CSN: [id number]  MRN: [id number]  : 1960  Today's date: 2017    Primary Care Physician: Ottoniel Toledo MD    Chief complaint:  Left facial droop, word salad, left sided weakness    HPI:  Mr. Harding is a 58 y/o WM who presented to the ED with c/o left facial droop, word salad and left sided weakness that started around 11 am at [employer].  He noticed he was having a hard time singing at [employer] and then his daughter noticed his facial droop and word salad.  His daughter brought him to the ED.  CT Head, CTA Head/Neck and CT Cerebral Perfusion were normal.  He was given TPA at 1420 and it was stopped at 1525.  His temp was 99.3, P 85, 143/69, R 18 and room air sat 97%.  He had a WBC 8,  Hgb 13.9, HCT 41.3, plts 183, PT 13.1, INR 1.1, BUN 18, Cr 1.0, K 4.0, Na 138.  He will be admitted into the ICU and started on the post TPA protocol.  Neurology has seen him in the emergency department and will follow.    PMH:   Past Medical History:   Diagnosis Date   • Anxiety    • Arthritis    • Asthma    • BiPAP (biphasic positive airway pressure) dependence    • Brachial neuritis 2017   • Chest pain    • COPD (chronic obstructive pulmonary disease)    • Depression    • Diabetes mellitus    • Dizziness    • Edema    • GERD (gastroesophageal reflux disease)    • H/O chest x-ray 2015    Mild Left base atelectasis   • H/O echocardiogram 2015    Normal LVSF. EF of 60-65%. Grade 1 diastolic dysfunction of the LV myocardium. No evidence of pericardial effusion   • H/O exercise stress test    • History of herniated intervertebral disc     History of left L5-S1 disc herniation   • LOM (loss of memory) 2017   • Lower back pain     Right   • Measles    • Obstructive sleep apnea    • Palpitations    • Pericardial effusion    • Seizure disorder    • Shortness of breath 2017   • SOB  (shortness of breath)    • Stroke    • Wears glasses      PSH:   Past Surgical History:   Procedure Laterality Date   • BACK SURGERY     • CARDIAC CATHETERIZATION     • CARDIAC CATHETERIZATION N/A 8/11/2017    Procedure: Coronary angiography;  Surgeon: Dallas Hernandez MD;  Location: [facility] CATH INVASIVE LOCATION;  Service:    • CARDIAC CATHETERIZATION N/A 8/11/2017    Procedure: Left Heart Cath;  Surgeon: Dallas Hernandez MD;  Location: [facility] CATH INVASIVE LOCATION;  Service:    • CARDIAC CATHETERIZATION N/A 8/11/2017    Procedure: Left ventriculography;  Surgeon: Dallas Hernandez MD;  Location: [facility] CATH INVASIVE LOCATION;  Service:    • CARDIOVASCULAR STRESS TEST  07/03/2017    WITH DR HERNANDEZ AT [facility]   • CARPAL TUNNEL RELEASE Right    • CATARACT EXTRACTION W/ INTRAOCULAR LENS IMPLANT Right 6/12/2017    Procedure: CATARACT PHACO EXTRACTION WITH INTRAOCULAR LENS IMPLANT RIGHT ;  Surgeon: Natalie Cao MD;  Location: [facility] OR;  Service:    • CATARACT EXTRACTION W/ INTRAOCULAR LENS IMPLANT Left 7/10/2017    Procedure: CATARACT PHACO EXTRACTION WITH INTRAOCULAR LENS IMPLANT LEFT;  Surgeon: Natalie Cao MD;  Location: [facility] OR;  Service:    • CHOLECYSTECTOMY     • COLONOSCOPY     • CORONARY ANGIOPLASTY WITH STENT PLACEMENT     • CORONARY ANGIOPLASTY WITH STENT PLACEMENT      X1-LAD   • CORONARY ARTERY BYPASS GRAFT N/A 8/15/2017    Procedure: CORONARY ARTERY BYPASS GRAFTING x 3 UTILIZING THE LEFT INTERNAL MAMMARY ARTERY WITH ENDOSCOPIC VEIN HARVESTING OF THE RIGHT GREATER SAPHENOUS VEIN, HAMLET, LAD ENDARECTOMY;  Surgeon: London Damon MD;  Location: [facility] OR;  Service:    • ENDOSCOPY     • EYE SURGERY      cataract surgery both eyes   • KNEE ARTHROSCOPY Bilateral    • NEUROPLASTY / TRANSPOSITION ULNAR NERVE AT ELBOW     • OTHER SURGICAL HISTORY      Foraminotomy and discectomy   • PERICARDIAL WINDOW N/A 8/25/2017    Procedure: PERICARDIAL WINDOW;  Surgeon: Freeman Phillips MD;  Location: [facility] OR;  Service:       PFH:   Family History   Problem Relation Age of Onset   • Hypertension Mother    • Arthritis Mother    • Alcohol abuse Father    • Heart disease Other    • Stroke Other    • Hypertension Other    • Other Other      Neurologic disorder   • Parkinsonism Other    • Stroke Other    • Heart disease Other    • Hypertension Other    • Heart disease Other    • Stroke Other    • Hypertension Other      SH:   Social History     Social History   • Marital status:      Spouse name: N/A   • Number of children: 1   • Years of education: N/A     Occupational History   • [employer]      Disabled since 2002-Back Problems     Social History Main Topics   • Smoking status: Former Smoker     Packs/day: 1.00     Years: 10.00     Types: Cigarettes     Quit date: 1/1/1998   • Smokeless tobacco: Former User     Types: Snuff     Quit date: 5/24/2017   • Alcohol use Yes      Comment: 3 PER YEAR   • Drug use: No   • Sexual activity: Defer     Other Topics Concern   • None     Social History Narrative    Caffeine use: 0 serving daily.    Patient lives at home with wife.          Allergies:   Allergies   Allergen Reactions   • Sulfa Antibiotics Anaphylaxis, Nausea And Vomiting and Delirium   • Codeine Nausea And Vomiting     Can only take with Phenergan   • Invokana [Canagliflozin]      DKA   • Morphine      Does not work. It causes pain     Code Status:  Full Code    Home Medications:  Prescriptions Prior to Admission   Medication Sig Dispense Refill Last Dose   • albuterol (PROVENTIL HFA;VENTOLIN HFA) 108 (90 BASE) MCG/ACT inhaler Inhale 2 puffs Every 4 (Four) Hours As Needed. ProAir  (90 Base) MCG/ACT Inhalation Aerosol Solution; Patient Sig: ProAir  (90 Base) MCG/ACT Inhalation Aerosol Solution ; 8; 0; 05-Oct-2015; Active   Taking   • amLODIPine (NORVASC) 5 MG tablet Take 1 tablet by mouth Daily. 90 tablet 1 Taking   • aspirin 81 MG EC tablet Take 1 tablet by mouth Daily.      • atorvastatin  (LIPITOR) 40 MG tablet Take 1 tablet by mouth Every Night. 90 tablet 1 Taking   • Cetirizine HCl 10 MG capsule Take 1 capsule by mouth daily.   Taking   • colchicine 0.6 MG tablet Take 1 tablet by mouth Daily. 30 tablet 1    • famotidine (PEPCID) 20 MG tablet Take 1 tablet by mouth Every Night. 30 tablet 0    • flunisolide (NASALIDE) 25 MCG/ACT (0.025%) solution nasal spray Inhale 1 spray Every 12 (Twelve) Hours As Needed (nasal congestion).   Taking   • furosemide (LASIX) 40 MG tablet One tablet on Sundays, Tuesdays, and Thursdays (Patient taking differently: 40 mg Daily.) 15 tablet 1 Taking   • gabapentin (NEURONTIN) 300 MG capsule Take 1 capsule by mouth 2 (Two) Times a Day. 60 capsule 5 Taking   • HUMALOG KWIKPEN 100 UNIT/ML solution pen-injector Inject 1 dose as directed Take As Directed. Follows [facility] From PCP  0 Taking   • hydrOXYzine (ATARAX) 25 MG tablet Take 2 tablets by mouth Every 6 (Six) Hours As Needed for Itching. 60 tablet 0    • insulin lispro (humaLOG) 100 UNIT/ML injection Inject 40 Units under the skin 3 (Three) Times a Day Before Meals.   Taking   • Insulin Pen Needle 31G X 5 MM misc    Taking   • lisinopril (PRINIVIL,ZESTRIL) 40 MG tablet Take 1 tablet by mouth daily.   Taking   • metoprolol tartrate (LOPRESSOR) 50 MG tablet Take 1 tablet by mouth Every 12 (Twelve) Hours. 180 tablet 1 Taking   • Misc Natural Products (TUMERSAID) tablet Take 1 tablet by mouth Daily.   Taking   • montelukast (SINGULAIR) 10 MG tablet take 1 tablet by mouth at bedtime 30 tablet 2 Taking   • omeprazole (PriLOSEC) 20 MG capsule Take 1 capsule by mouth daily.   Taking   • ondansetron (ZOFRAN) 4 MG tablet Take 1 tablet by mouth Every 8 (Eight) Hours As Needed for Nausea. 20 tablet 0    • potassium chloride (K-DUR,KLOR-CON) 20 MEQ CR tablet One tablet on Sundays, Tuesdays, and Thursdays 15 tablet 1 Taking   • ranolazine (RANEXA) 500 MG 12 hr tablet Take 1 tablet by mouth 2 (Two) Times a Day. 60 tablet 11 Taking   •  "topiramate (TOPAMAX) 100 MG tablet Take 100 mg by mouth Daily.   Taking   • TOUJEO SOLOSTAR 300 UNIT/ML solution pen-injector Inject 80 Units as directed Every Night.  0 Taking   • Vortioxetine HBr (TRINTELLIX) 10 MG tablet Take 10 mg by mouth Daily.   Taking   • azithromycin (ZITHROMAX) 250 MG tablet Take 1 tablet by mouth Daily. Take 1 tablet daily for 4 days. 4 tablet 0    • HYDROcod Polst-CPM Polst ER (TUSSIONEX PENNKINETIC ER) 10-8 MG/5ML ER suspension Take 5 mL by mouth Every 12 (Twelve) Hours As Needed for Cough. 100 mL 0    • predniSONE (DELTASONE) 20 MG tablet Take 2 tablets by mouth Daily. 5 tablet 0      Review of Systems  Negative systems except for what is noted in HPI     Vital Signs:  Blood pressure 139/79, pulse 85, temperature 99.3 °F (37.4 °C), temperature source Oral, resp. rate 18, height 177.8 cm (70\"), weight 116 kg (255 lb), SpO2 95 %.    Objective:  General Appearance:  In no acute distress.    Vital signs: (most recent): Blood pressure 139/79, pulse 85, temperature 98.9 °F (37.2 °C), temperature source Oral, resp. rate 18, height 177.8 cm (70\"), weight 115 kg (254 lb 4.8 oz), SpO2 95 %.    HEENT: Normal HEENT exam.    Lungs:  Normal respiratory rate and normal effort.  He is not in respiratory distress.  Breath sounds clear to auscultation.  No wheezes, rales or rhonchi.    Heart: Normal rate.  Regular rhythm.  S1 normal and S2 normal.  No murmur, gallop or friction rub.   Chest: Symmetric chest wall expansion.   Abdomen: Abdomen is soft and non-distended.  Bowel sounds are normal.   There is no abdominal tenderness.   There is no mass. There is no splenomegaly. There is no hepatomegaly.   Extremities: There is no deformity or dependent edema.    Neurological: Patient is alert and oriented to person, place and time.  (No clear focal motor deficits).    Pupils:  Pupils are equal, round, and reactive to light.    Skin:  Warm and dry.                 Interval: baseline  1a. Level Of " Consciousness: 0-->Alert: keenly responsive  1b. LOC Questions: 0-->Answers both questions correctly  1c. LOC Commands: 0-->Performs both tasks correctly  2. Best Gaze: 0-->Normal  3. Visual: 1-->Partial hemianopia  4. Facial Palsy: 1-->Minor paralysis (flattened nasolabial fold, asymmetry on smiling)  5a. Motor Arm, Left: 0-->No drift: limb holds 90 (or 45) degrees for full 10 secs  5b. Motor Arm, Right: 0-->No drift: limb holds 90 (or 45) degrees for full 10 secs  6a. Motor Leg, Left: 0-->No drift: leg holds 30 degree position for full 5 secs  6b. Motor Leg, Right: 0-->No drift: leg holds 30 degree position for full 5 secs  7. Limb Ataxia: 0-->Absent  8. Sensory: 1-->Mild-to-moderate sensory loss: patient feels pinprick is less sharp or is dull on the affected side: or there is a loss of superficial pain with pinprick, but patient is aware of being touched  9. Best Language: 0-->No aphasia: normal  10. Dysarthria: 1-->Mild-to-moderate dysarthria: patient slurs at least some words and, at worst, can be understood with some difficulty  11. Extinction and Inattention (formerly Neglect): 0-->No abnormality    Total (NIH Stroke Scale): 4    Labs:    Results from last 7 days  Lab Units 12/17/17  1417   WBC 10*3/mm3 8.07   HEMOGLOBIN g/dL 13.9   HEMATOCRIT % 41.3   PLATELETS 10*3/mm3 183       Results from last 7 days  Lab Units 12/17/17  1417 12/17/17  1416   CREATININE mg/dL  --  1.00   ALT (SGPT) U/L 30  --    AST (SGOT) U/L 22  --      No results found for: MG, PHOS    Imaging:  CT Head revealed normal unenhanced CT scan of head    CTA Head was normal    CTA Neck was normal    CT Cerebral Perfusion Scan was normal    ECHO: 8/2017 Left ventricular systolic function is normal. Estimated EF appears to be in the range of 61 - 65%. Left ventricular wall thickness is consistent with mild concentric hypertrophy. Left ventricular diastolic dysfunction (grade II) consistent with pseudonormalization. Normal right ventricular  cavity size, wall thickness, systolic function and septal motion noted. There is a moderate (1-2cm) circumferential pericardial effusion. The the effusion is fibrinous. There is no evidence of cardiac tamponade.    Assessment:  Hospital Problem List     * (Principal)CVA (cerebral vascular accident)    Left-sided weakness    Coronary artery disease involving native coronary artery of native heart with unstable angina pectoris    Overview Signed 8/15/2017  3:38 PM by LARRY Isaac     1. [facility] 8-11-17:  · Severe three-vessel coronary artery disease  · Preserved global and regional left ventricular systolic function  · Elevated left ventricular filling pressures consistent with diastolic heart failure.  2. CABG x 3 (8-15-17):         Hypercholesterolemia    Overview Signed 11/2/2016  9:34 AM by Shannon Torres      Assessed By: Yasmin Millard (Cardiology); Last Assessed: 13 Aug 2015         GERD (gastroesophageal reflux disease)    Diabetes mellitus (Chronic)    Essential hypertension    History of CVA (cerebrovascular accident)        ANTONIO Hernandez, AGACNP-BC, FNP-BC  Pulmonary & Critical Care Medicine    57-year-old male with apparent acute CVA with left facial droop, left-sided weakness, and expressive difficulties.  Now status post TPA with minimal residual defects.  Imaging studies essentially normal including CT cerebral perfusion scan    Plan:     1. ICU admission  2. Post CVA TPA pathway  3. Blood sugar control.  He is on high doses of insulin at home.  4. Neurology will continue to follow  5. MRI planned    I have seen and examined patient, performing a face-to-face diagnostic evaluation with plan of care reviewed and developed with APRN and nursing staff. I have addended and modified the above history of present illness, physical examination, and assessment and plan to reflect my findings and impressions.    Peter Perez MD  Pulmonary and Critical Care Medicine

## 2017-12-18 ENCOUNTER — APPOINTMENT (OUTPATIENT)
Dept: CARDIOLOGY | Facility: HOSPITAL | Age: 57
End: 2017-12-18

## 2017-12-18 ENCOUNTER — APPOINTMENT (OUTPATIENT)
Dept: MRI IMAGING | Facility: HOSPITAL | Age: 57
End: 2017-12-18

## 2017-12-18 ENCOUNTER — APPOINTMENT (OUTPATIENT)
Dept: CT IMAGING | Facility: HOSPITAL | Age: 57
End: 2017-12-18

## 2017-12-18 ENCOUNTER — APPOINTMENT (OUTPATIENT)
Dept: CARDIAC REHAB | Facility: HOSPITAL | Age: 57
End: 2017-12-18

## 2017-12-18 PROBLEM — E11.9 DIABETES MELLITUS: Status: ACTIVE | Noted: 2017-04-11

## 2017-12-18 LAB
ALBUMIN SERPL-MCNC: 3.7 G/DL (ref 3.2–4.8)
ALBUMIN/GLOB SERPL: 1.6 G/DL (ref 1.5–2.5)
ALP SERPL-CCNC: 83 U/L (ref 25–100)
ALT SERPL W P-5'-P-CCNC: 25 U/L (ref 7–40)
ANION GAP SERPL CALCULATED.3IONS-SCNC: 8 MMOL/L (ref 3–11)
ARTICHOKE IGE QN: 75 MG/DL (ref 0–130)
AST SERPL-CCNC: 16 U/L (ref 0–33)
BH CV ECHO MEAS - AO MAX PG (FULL): -0.12 MMHG
BH CV ECHO MEAS - AO MAX PG: 3 MMHG
BH CV ECHO MEAS - AO ROOT AREA (BSA CORRECTED): 1.2
BH CV ECHO MEAS - AO ROOT AREA: 6 CM^2
BH CV ECHO MEAS - AO ROOT DIAM: 2.8 CM
BH CV ECHO MEAS - AO V2 MAX: 90.3 CM/SEC
BH CV ECHO MEAS - AVA(V,A): 4.4 CM^2
BH CV ECHO MEAS - AVA(V,D): 4.4 CM^2
BH CV ECHO MEAS - BSA(HAYCOCK): 2.4 M^2
BH CV ECHO MEAS - BSA: 2.3 M^2
BH CV ECHO MEAS - BZI_BMI: 36.3 KILOGRAMS/M^2
BH CV ECHO MEAS - BZI_METRIC_HEIGHT: 177.8 CM
BH CV ECHO MEAS - BZI_METRIC_WEIGHT: 114.8 KG
BH CV ECHO MEAS - EDV(CUBED): 174.1 ML
BH CV ECHO MEAS - EDV(TEICH): 152.6 ML
BH CV ECHO MEAS - EF(CUBED): 69.1 %
BH CV ECHO MEAS - EF(TEICH): 60 %
BH CV ECHO MEAS - ESV(CUBED): 53.8 ML
BH CV ECHO MEAS - ESV(TEICH): 61 ML
BH CV ECHO MEAS - FS: 32.4 %
BH CV ECHO MEAS - IVS/LVPW: 0.88
BH CV ECHO MEAS - IVSD: 1.3 CM
BH CV ECHO MEAS - LA DIMENSION: 3.8 CM
BH CV ECHO MEAS - LA/AO: 1.4
BH CV ECHO MEAS - LAT PEAK E' VEL: 8.3 CM/SEC
BH CV ECHO MEAS - LV MASS(C)D: 326.9 GRAMS
BH CV ECHO MEAS - LV MASS(C)DI: 141.7 GRAMS/M^2
BH CV ECHO MEAS - LV MAX PG: 3.1 MMHG
BH CV ECHO MEAS - LV MEAN PG: 1.5 MMHG
BH CV ECHO MEAS - LV V1 MAX: 88.4 CM/SEC
BH CV ECHO MEAS - LV V1 MEAN: 56.8 CM/SEC
BH CV ECHO MEAS - LV V1 VTI: 19 CM
BH CV ECHO MEAS - LVIDD: 5.6 CM
BH CV ECHO MEAS - LVIDS: 3.8 CM
BH CV ECHO MEAS - LVOT AREA (M): 4.5 CM^2
BH CV ECHO MEAS - LVOT AREA: 4.5 CM^2
BH CV ECHO MEAS - LVOT DIAM: 2.4 CM
BH CV ECHO MEAS - LVPWD: 1.4 CM
BH CV ECHO MEAS - MED PEAK E' VEL: 4.93 CM/SEC
BH CV ECHO MEAS - MV A MAX VEL: 90.8 CM/SEC
BH CV ECHO MEAS - MV DEC TIME: 0.26 SEC
BH CV ECHO MEAS - MV E MAX VEL: 75 CM/SEC
BH CV ECHO MEAS - MV E/A: 0.83
BH CV ECHO MEAS - PA ACC SLOPE: 929.9 CM/SEC^2
BH CV ECHO MEAS - PA ACC TIME: 0.11 SEC
BH CV ECHO MEAS - PA MAX PG: 4.1 MMHG
BH CV ECHO MEAS - PA PR(ACCEL): 31.5 MMHG
BH CV ECHO MEAS - PA V2 MAX: 100.8 CM/SEC
BH CV ECHO MEAS - SI(CUBED): 52.1 ML/M^2
BH CV ECHO MEAS - SI(LVOT): 37.3 ML/M^2
BH CV ECHO MEAS - SI(TEICH): 39.7 ML/M^2
BH CV ECHO MEAS - SV(CUBED): 120.2 ML
BH CV ECHO MEAS - SV(LVOT): 86 ML
BH CV ECHO MEAS - SV(TEICH): 91.6 ML
BH CV VAS BP LEFT ARM: NORMAL MMHG
BH CV XLRA - TDI S': 10 CM/SEC
BH CV XLRA MEAS LEFT DIST CCA EDV: 18 CM/SEC
BH CV XLRA MEAS LEFT DIST CCA PSV: 88 CM/SEC
BH CV XLRA MEAS LEFT DIST ICA EDV: 20 CM/SEC
BH CV XLRA MEAS LEFT DIST ICA PSV: 55 CM/SEC
BH CV XLRA MEAS LEFT ICA/CCA RATIO: 0.4
BH CV XLRA MEAS LEFT MID CCA EDV: 18 CM/SEC
BH CV XLRA MEAS LEFT MID CCA PSV: 118 CM/SEC
BH CV XLRA MEAS LEFT MID ICA EDV: 15 CM/SEC
BH CV XLRA MEAS LEFT MID ICA PSV: 37 CM/SEC
BH CV XLRA MEAS LEFT PROX CCA EDV: 28 CM/SEC
BH CV XLRA MEAS LEFT PROX CCA PSV: 141 CM/SEC
BH CV XLRA MEAS LEFT PROX ECA EDV: 12 CM/SEC
BH CV XLRA MEAS LEFT PROX ECA PSV: 86 CM/SEC
BH CV XLRA MEAS LEFT PROX ICA EDV: 11 CM/SEC
BH CV XLRA MEAS LEFT PROX ICA PSV: 34 CM/SEC
BH CV XLRA MEAS LEFT PROX SCLA EDV: 1 CM/SEC
BH CV XLRA MEAS LEFT PROX SCLA PSV: 157 CM/SEC
BH CV XLRA MEAS LEFT VERTEBRAL A EDV: 13 CM/SEC
BH CV XLRA MEAS LEFT VERTEBRAL A PSV: 43 CM/SEC
BH CV XLRA MEAS RIGHT DIST CCA EDV: 19 CM/SEC
BH CV XLRA MEAS RIGHT DIST CCA PSV: 92 CM/SEC
BH CV XLRA MEAS RIGHT DIST ICA EDV: 23 CM/SEC
BH CV XLRA MEAS RIGHT DIST ICA PSV: 68 CM/SEC
BH CV XLRA MEAS RIGHT ICA/CCA RATIO: 0.7
BH CV XLRA MEAS RIGHT MID CCA EDV: 20 CM/SEC
BH CV XLRA MEAS RIGHT MID CCA PSV: 108 CM/SEC
BH CV XLRA MEAS RIGHT MID ICA EDV: 22 CM/SEC
BH CV XLRA MEAS RIGHT MID ICA PSV: 67 CM/SEC
BH CV XLRA MEAS RIGHT PROX CCA EDV: 21 CM/SEC
BH CV XLRA MEAS RIGHT PROX CCA PSV: 106 CM/SEC
BH CV XLRA MEAS RIGHT PROX ECA EDV: 15 CM/SEC
BH CV XLRA MEAS RIGHT PROX ECA PSV: 113 CM/SEC
BH CV XLRA MEAS RIGHT PROX ICA EDV: 20 CM/SEC
BH CV XLRA MEAS RIGHT PROX ICA PSV: 61 CM/SEC
BH CV XLRA MEAS RIGHT PROX SCLA EDV: 0 CM/SEC
BH CV XLRA MEAS RIGHT PROX SCLA PSV: 186 CM/SEC
BH CV XLRA MEAS RIGHT VERTEBRAL A EDV: 7 CM/SEC
BH CV XLRA MEAS RIGHT VERTEBRAL A PSV: 35 CM/SEC
BILIRUB SERPL-MCNC: 0.5 MG/DL (ref 0.3–1.2)
BUN BLD-MCNC: 13 MG/DL (ref 9–23)
BUN/CREAT SERPL: 16.3 (ref 7–25)
CALCIUM SPEC-SCNC: 8.8 MG/DL (ref 8.7–10.4)
CHLORIDE SERPL-SCNC: 105 MMOL/L (ref 99–109)
CHOLEST SERPL-MCNC: 112 MG/DL (ref 0–200)
CO2 SERPL-SCNC: 24 MMOL/L (ref 20–31)
CREAT BLD-MCNC: 0.8 MG/DL (ref 0.6–1.3)
DEPRECATED RDW RBC AUTO: 40.8 FL (ref 37–54)
E/E' RATIO: 11.5
ERYTHROCYTE [DISTWIDTH] IN BLOOD BY AUTOMATED COUNT: 14.8 % (ref 11.3–14.5)
GFR SERPL CREATININE-BSD FRML MDRD: 100 ML/MIN/1.73
GLOBULIN UR ELPH-MCNC: 2.3 GM/DL
GLUCOSE BLD-MCNC: 214 MG/DL (ref 70–100)
GLUCOSE BLDC GLUCOMTR-MCNC: 213 MG/DL (ref 70–130)
GLUCOSE BLDC GLUCOMTR-MCNC: 282 MG/DL (ref 70–130)
GLUCOSE BLDC GLUCOMTR-MCNC: 288 MG/DL (ref 70–130)
GLUCOSE BLDC GLUCOMTR-MCNC: 297 MG/DL (ref 70–130)
GLUCOSE BLDC GLUCOMTR-MCNC: 304 MG/DL (ref 70–130)
GLUCOSE BLDC GLUCOMTR-MCNC: 305 MG/DL (ref 70–130)
HBA1C MFR BLD: 10.2 % (ref 4.8–5.6)
HCT VFR BLD AUTO: 39.8 % (ref 38.9–50.9)
HDLC SERPL-MCNC: 28 MG/DL (ref 40–60)
HGB BLD-MCNC: 13.1 G/DL (ref 13.1–17.5)
MAXIMAL PREDICTED HEART RATE: 164 BPM
MCH RBC QN AUTO: 24.8 PG (ref 27–31)
MCHC RBC AUTO-ENTMCNC: 32.9 G/DL (ref 32–36)
MCV RBC AUTO: 75.4 FL (ref 80–99)
PLATELET # BLD AUTO: 171 10*3/MM3 (ref 150–450)
PMV BLD AUTO: 10.9 FL (ref 6–12)
POTASSIUM BLD-SCNC: 3.8 MMOL/L (ref 3.5–5.5)
PROT SERPL-MCNC: 6 G/DL (ref 5.7–8.2)
RBC # BLD AUTO: 5.28 10*6/MM3 (ref 4.2–5.76)
RIGHT ARM BP: NORMAL MMHG
SODIUM BLD-SCNC: 137 MMOL/L (ref 132–146)
STRESS TARGET HR: 139 BPM
TRIGL SERPL-MCNC: 127 MG/DL (ref 0–150)
WBC NRBC COR # BLD: 7.33 10*3/MM3 (ref 3.5–10.8)

## 2017-12-18 PROCEDURE — 99232 SBSQ HOSP IP/OBS MODERATE 35: CPT | Performed by: PSYCHIATRY & NEUROLOGY

## 2017-12-18 PROCEDURE — 80061 LIPID PANEL: CPT | Performed by: NURSE PRACTITIONER

## 2017-12-18 PROCEDURE — 80053 COMPREHEN METABOLIC PANEL: CPT | Performed by: NURSE PRACTITIONER

## 2017-12-18 PROCEDURE — 97165 OT EVAL LOW COMPLEX 30 MIN: CPT

## 2017-12-18 PROCEDURE — 83036 HEMOGLOBIN GLYCOSYLATED A1C: CPT | Performed by: NURSE PRACTITIONER

## 2017-12-18 PROCEDURE — 97161 PT EVAL LOW COMPLEX 20 MIN: CPT

## 2017-12-18 PROCEDURE — 25010000002 SULFUR HEXAFLUORIDE MICROSPH 60.7-25 MG RECONSTITUTED SUSPENSION: Performed by: INTERNAL MEDICINE

## 2017-12-18 PROCEDURE — 99232 SBSQ HOSP IP/OBS MODERATE 35: CPT | Performed by: INTERNAL MEDICINE

## 2017-12-18 PROCEDURE — 93880 EXTRACRANIAL BILAT STUDY: CPT

## 2017-12-18 PROCEDURE — 70551 MRI BRAIN STEM W/O DYE: CPT

## 2017-12-18 PROCEDURE — 82962 GLUCOSE BLOOD TEST: CPT

## 2017-12-18 PROCEDURE — 93306 TTE W/DOPPLER COMPLETE: CPT | Performed by: INTERNAL MEDICINE

## 2017-12-18 PROCEDURE — 93306 TTE W/DOPPLER COMPLETE: CPT

## 2017-12-18 PROCEDURE — 85027 COMPLETE CBC AUTOMATED: CPT | Performed by: NURSE PRACTITIONER

## 2017-12-18 PROCEDURE — 70450 CT HEAD/BRAIN W/O DYE: CPT

## 2017-12-18 PROCEDURE — 93880 EXTRACRANIAL BILAT STUDY: CPT | Performed by: INTERNAL MEDICINE

## 2017-12-18 PROCEDURE — 94640 AIRWAY INHALATION TREATMENT: CPT

## 2017-12-18 PROCEDURE — 63710000001 INSULIN DETEMIR PER 5 UNITS: Performed by: INTERNAL MEDICINE

## 2017-12-18 RX ORDER — MONTELUKAST SODIUM 10 MG/1
10 TABLET ORAL NIGHTLY
Status: DISCONTINUED | OUTPATIENT
Start: 2017-12-18 | End: 2017-12-22 | Stop reason: HOSPADM

## 2017-12-18 RX ORDER — GABAPENTIN 300 MG/1
300 CAPSULE ORAL 2 TIMES DAILY
Status: DISCONTINUED | OUTPATIENT
Start: 2017-12-18 | End: 2017-12-22 | Stop reason: HOSPADM

## 2017-12-18 RX ORDER — ASPIRIN 81 MG/1
81 TABLET ORAL DAILY
Status: DISCONTINUED | OUTPATIENT
Start: 2017-12-18 | End: 2017-12-18

## 2017-12-18 RX ORDER — FUROSEMIDE 40 MG/1
40 TABLET ORAL DAILY
COMMUNITY
End: 2018-06-18 | Stop reason: SDUPTHER

## 2017-12-18 RX ORDER — CLOPIDOGREL BISULFATE 75 MG/1
75 TABLET ORAL DAILY
COMMUNITY
End: 2017-12-22 | Stop reason: HOSPADM

## 2017-12-18 RX ORDER — IPRATROPIUM BROMIDE AND ALBUTEROL SULFATE 2.5; .5 MG/3ML; MG/3ML
3 SOLUTION RESPIRATORY (INHALATION) EVERY 4 HOURS PRN
Status: DISCONTINUED | OUTPATIENT
Start: 2017-12-18 | End: 2017-12-22 | Stop reason: HOSPADM

## 2017-12-18 RX ORDER — TOPIRAMATE 25 MG/1
100 TABLET ORAL DAILY
Status: DISCONTINUED | OUTPATIENT
Start: 2017-12-18 | End: 2017-12-22 | Stop reason: HOSPADM

## 2017-12-18 RX ORDER — SIMVASTATIN 20 MG
20 TABLET ORAL NIGHTLY
Status: ON HOLD | COMMUNITY
End: 2017-12-19 | Stop reason: ALTCHOICE

## 2017-12-18 RX ORDER — AMLODIPINE BESYLATE 5 MG/1
5 TABLET ORAL DAILY
Status: DISCONTINUED | OUTPATIENT
Start: 2017-12-18 | End: 2017-12-22 | Stop reason: HOSPADM

## 2017-12-18 RX ORDER — RANOLAZINE 500 MG/1
500 TABLET, EXTENDED RELEASE ORAL EVERY 12 HOURS SCHEDULED
Status: DISCONTINUED | OUTPATIENT
Start: 2017-12-18 | End: 2017-12-22 | Stop reason: HOSPADM

## 2017-12-18 RX ORDER — LISINOPRIL 40 MG/1
40 TABLET ORAL DAILY
Status: DISCONTINUED | OUTPATIENT
Start: 2017-12-18 | End: 2017-12-22 | Stop reason: HOSPADM

## 2017-12-18 RX ORDER — METOPROLOL TARTRATE 50 MG/1
50 TABLET, FILM COATED ORAL EVERY 12 HOURS SCHEDULED
Status: DISCONTINUED | OUTPATIENT
Start: 2017-12-18 | End: 2017-12-22 | Stop reason: HOSPADM

## 2017-12-18 RX ADMIN — BENZOCAINE AND MENTHOL 1 LOZENGE: 15; 3.6 LOZENGE ORAL at 21:36

## 2017-12-18 RX ADMIN — INSULIN LISPRO 8 UNITS: 100 INJECTION, SOLUTION INTRAVENOUS; SUBCUTANEOUS at 08:20

## 2017-12-18 RX ADMIN — INSULIN LISPRO 16 UNITS: 100 INJECTION, SOLUTION INTRAVENOUS; SUBCUTANEOUS at 12:15

## 2017-12-18 RX ADMIN — RANOLAZINE 500 MG: 500 TABLET, FILM COATED, EXTENDED RELEASE ORAL at 17:04

## 2017-12-18 RX ADMIN — ASPIRIN 325 MG ORAL TABLET 325 MG: 325 PILL ORAL at 17:04

## 2017-12-18 RX ADMIN — METOPROLOL TARTRATE 50 MG: 50 TABLET ORAL at 20:26

## 2017-12-18 RX ADMIN — INSULIN LISPRO 16 UNITS: 100 INJECTION, SOLUTION INTRAVENOUS; SUBCUTANEOUS at 17:03

## 2017-12-18 RX ADMIN — METOPROLOL TARTRATE 50 MG: 50 TABLET ORAL at 17:03

## 2017-12-18 RX ADMIN — INSULIN LISPRO 12 UNITS: 100 INJECTION, SOLUTION INTRAVENOUS; SUBCUTANEOUS at 21:38

## 2017-12-18 RX ADMIN — TOPIRAMATE 100 MG: 25 TABLET, FILM COATED ORAL at 17:03

## 2017-12-18 RX ADMIN — GUAIFENESIN 200 MG: 200 SOLUTION ORAL at 21:36

## 2017-12-18 RX ADMIN — MONTELUKAST SODIUM 10 MG: 10 TABLET, FILM COATED ORAL at 20:26

## 2017-12-18 RX ADMIN — GUAIFENESIN 200 MG: 200 SOLUTION ORAL at 17:02

## 2017-12-18 RX ADMIN — GABAPENTIN 300 MG: 400 CAPSULE ORAL at 17:03

## 2017-12-18 RX ADMIN — SULFUR HEXAFLUORIDE 2 ML: KIT at 13:45

## 2017-12-18 RX ADMIN — LISINOPRIL 40 MG: 40 TABLET ORAL at 17:03

## 2017-12-18 RX ADMIN — ATORVASTATIN CALCIUM 80 MG: 40 TABLET, FILM COATED ORAL at 20:25

## 2017-12-18 RX ADMIN — RANOLAZINE 500 MG: 500 TABLET, FILM COATED, EXTENDED RELEASE ORAL at 20:25

## 2017-12-18 RX ADMIN — INSULIN DETEMIR 40 UNITS: 100 INJECTION, SOLUTION SUBCUTANEOUS at 20:33

## 2017-12-18 RX ADMIN — AMLODIPINE BESYLATE 5 MG: 5 TABLET ORAL at 17:03

## 2017-12-18 RX ADMIN — IPRATROPIUM BROMIDE AND ALBUTEROL SULFATE 3 ML: .5; 3 SOLUTION RESPIRATORY (INHALATION) at 17:34

## 2017-12-18 NOTE — PLAN OF CARE
Problem: Patient Care Overview (Adult)  Goal: Plan of Care Review  Outcome: Ongoing (interventions implemented as appropriate)    12/18/17 0921   Coping/Psychosocial Response Interventions   Plan Of Care Reviewed With patient   Outcome Evaluation   Outcome Summary/Follow up Plan pt. presents with stable signs and symptoms to include decreased dynamic standing balance, gait mechanics and endurance. Pt. will benefit from skilled PT to address aforementioned limitations and progress independence with mobility. Recommending home with assist at time of discharge.         Problem: Inpatient Physical Therapy  Goal: Transfer Training Goal 1 LTG- PT  Outcome: Ongoing (interventions implemented as appropriate)    12/18/17 0921   Transfer Training PT LTG   Transfer Training PT LTG, Date Established 12/18/17   Transfer Training PT LTG, Time to Achieve 2 wks   Transfer Training PT LTG, Activity Type all transfers   Transfer Training PT LTG, [place] Level independent       Goal: Gait Training Goal LTG- PT  Outcome: Ongoing (interventions implemented as appropriate)    12/18/17 0921   Gait Training PT LTG   Gait Training Goal PT LTG, Date Established 12/18/17   Gait Training Goal PT LTG, Time to Achieve 2 wks   Gait Training Goal PT LTG, [place] Level independent   Gait Training Goal PT LTG, Distance to Achieve 600'       Goal: Stair Training Goal LTG- PT  Outcome: Ongoing (interventions implemented as appropriate)    12/18/17 0921   Stair Training PT LTG   Stair Training Goal PT LTG, Date Established 12/18/17   Stair Training Goal PT LTG, Time to Achieve 2 wks   Stair Training Goal PT LTG, Number of Steps 3   Stair Training Goal PT LTG, [place] Level supervision required   Stair Training Goal PT LTG, Assist Device 1 handrail

## 2017-12-18 NOTE — PROGRESS NOTES
Discharge Planning Assessment  [facility]     Patient Name: Denzel Harding  MRN: [id number]  Today's Date: 12/18/2017    Admit Date: 12/17/2017          Discharge Needs Assessment       12/18/17 1336    Living Environment    Lives With spouse    Living Arrangements house    Home Accessibility bed and bath on same level   ramp to enter home    Number of Stairs to Enter Home 3    Stair Railings at Home none    Type of Financial/Environmental Concern --   Pt has [employer] to cover his medication.    Transportation Available family or friend will provide    Living Environment Comment Pt lives with spouse in 1 level home with a ramp to enter home in [facility].    Living Environment    Provides Primary Care For no one    Quality Of Family Relationships supportive    Able to Return to Prior Living Arrangements yes    Discharge Needs Assessment    Concerns To Be Addressed basic needs concerns;adjustment to diagnosis/illness concerns    Readmission Within The Last 30 Days no previous admission in last 30 days    Outpatient/Agency/Support Group Needs --   Pt denies using HH or DME.    Equipment Currently Used at Home bipap/ cpap;cane, straight;walker, rolling    Discharge Disposition still a patient    Discharge Contact Information if Applicable Petros Harding (spouse)  [phone]            Discharge Plan       12/18/17 1344    Case Management/Social Work Plan    Plan Home    Patient/Family In Agreement With Plan yes    Additional Comments Met with pt and his mother in room, pt was independent with ADL's and mobility prior to this admission. Pt denies using HH or DME. Pt does have a CPAP machine that he does use thru [employer].        Discharge Placement     No information found                Demographic Summary       12/18/17 1335    Referral Information    Admission Type inpatient    Arrived From home or self-care    Referral Source admission list    Reason For Consult discharge planning    Record Reviewed  clinical discipline documentation;history and physical    Contact Information    Permission Granted to Share Information With     Primary Care Physician Information    Name Ottoniel Toledo            Functional Status       12/18/17 4780    Functional Status Prior    Ambulation 0-->independent    Transferring 0-->independent    Toileting 0-->independent    Bathing 0-->independent    Dressing 0-->independent    Eating 0-->independent    Communication 0-->understands/communicates without difficulty    Swallowing 0-->swallows foods/liquids without difficulty    Prior Functional Level Comment independent    IADL    Medications independent    Meal Preparation independent    Housekeeping independent    Laundry independent    Shopping independent    Oral Care independent    IADL Comments independent    Activity Tolerance    Usual Activity Tolerance good            Psychosocial     None            Abuse/Neglect     None            Legal     None            Substance Abuse     None            Patient Forms     None          Saloni Pillai RN

## 2017-12-18 NOTE — SIGNIFICANT NOTE
12/18/17 1534   SLP Deferred Reason   SLP Deferred Reason (Consult received for cog/comm eval per CVA pathway. Spoke to RN who reported pt passed RN CVA dysphagia screen, is tolerating diet, & not exhibited speech/lang issues. SLP will f/u for eval per protocol tomorrow 2' heavy caseload today.)

## 2017-12-18 NOTE — PLAN OF CARE
Problem: Patient Care Overview (Adult)  Goal: Plan of Care Review  Outcome: Ongoing (interventions implemented as appropriate)    12/18/17 1142   Coping/Psychosocial Response Interventions   Plan Of Care Reviewed With patient   Outcome Evaluation   Outcome Summary/Follow up Plan OT completed a brief chart review relating to presenting physical deficits. Pt CGA for t/fs and functional mobility, limited d/t c/o dizziness though VSS. Recommend cont skilled IPOT intervention. Recommend pt DC home w/ assist.    Patient Care Overview   Progress progress toward functional goals as expected         Problem: Inpatient Occupational Therapy  Goal: Transfer Training Goal 1 LTG- OT  Outcome: Ongoing (interventions implemented as appropriate)    12/18/17 1142   Transfer Training OT LTG   Transfer Training OT LTG, Date Established 12/18/17   Transfer Training OT LTG, Time to Achieve by discharge   Transfer Training OT LTG, Activity Type toilet;sit to stand/stand to sit   Transfer Training OT LTG, [place] Level supervision required   Transfer Training OT LTG, Additional Goal AAD       Goal: LB Dressing Goal LTG- OT  Outcome: Ongoing (interventions implemented as appropriate)    12/18/17 1142   LB Dressing OT LTG   LB Dressing Goal OT LTG, Date Established 12/18/17   LB Dressing Goal OT LTG, Time to Achieve by discharge   LB Dressing Goal OT LTG, Activity Type Don pants/socks   LB Dressing Goal OT LTG, [place] Level supervision required   LB Dressing Goal OT LTG, Additional Goal AAD       Goal: Functional Mobility Goal LTG- OT  Outcome: Ongoing (interventions implemented as appropriate)    12/18/17 1142   Functional Mobility OT LTG   Functional Mobility Goal OT LTG, Date Established 12/18/17   Functional Mobility Goal OT LTG, Time to Achieve by discharge   Functional Mobility Goal OT LTG, [place] Level standby assist   Functional Mobility Goal OT LTG, Assist Device appropriate AD   Functional Mobility Goal OT LTG,  Distance to Achieve to the bathroom

## 2017-12-18 NOTE — CONSULTS
"Diabetes Education  Assessment/Teaching    Patient Name:  Denzel Harding  YOB: 1960  MRN: [id number]  Admit Date:  12/17/2017      Assessment Date:  12/18/2017       Most Recent Value    General Information      Height  177.8 cm (70\")    Height Method  Stated    Weight  115 kg (254 lb 4.8 oz)    Weight Method  Bed scale [zeroed]    Pregnancy Assessment     Diabetes History     Education Preferences     Nutrition Information     Assessment Topics     DM Goals                 Other Comments:  Attempted to see patient for diabetes education on two separate occasions. First attempt to see patient he was undergoing an ultrasound test.  The second attempt to see pt he was being wheeled out of his room for another ordered procedure or test.  Will attempt to see patient at a later time.           Electronically signed by:  Mary Ruby RN  12/18/17 3:43 PM  "

## 2017-12-18 NOTE — PLAN OF CARE
Problem: Patient Care Overview (Adult)  Goal: Plan of Care Review  Outcome: Ongoing (interventions implemented as appropriate)  Goal: Adult Individualization and Mutuality  Outcome: Ongoing (interventions implemented as appropriate)  Goal: Discharge Needs Assessment  Outcome: Ongoing (interventions implemented as appropriate)    Problem: Stroke (Ischemic) (Adult)  Goal: Signs and Symptoms of Listed Potential Problems Will be Absent or Manageable (Stroke)  Outcome: Ongoing (interventions implemented as appropriate)    Problem: Skin Integrity Impairment, Risk/Actual (Adult)  Goal: Identify Related Risk Factors and Signs and Symptoms  Outcome: Ongoing (interventions implemented as appropriate)  Goal: Skin Integrity/Wound Healing  Outcome: Ongoing (interventions implemented as appropriate)

## 2017-12-18 NOTE — THERAPY EVALUATION
Acute Care - Occupational Therapy Initial Evaluation  [facility]     Patient Name: Denzel Harding  : 1960  MRN: [id number]  Today's Date: 2017  Onset of Illness/Injury or Date of Surgery Date: 17  Date of Referral to OT: 17  Referring Physician: ANTONIO Eisenberg    Admit Date: 2017       ICD-10-CM ICD-9-CM   1. Left-sided weakness R53.1 728.87   2. Paresthesia of left arm and leg R20.2 782.0   3. Impaired functional mobility, balance, gait, and endurance Z74.09 V49.89   4. Impaired mobility and ADLs Z74.09 799.89     Patient Active Problem List   Diagnosis   • Coronary artery disease involving native coronary artery of native heart with unstable angina pectoris   • Lumbar radiculopathy   • Hypercholesterolemia   • Diabetic polyneuropathy associated with type 2 diabetes mellitus   • Migraine with aura and with status migrainosus   • Palpitations   • Seizure disorder   • GERD (gastroesophageal reflux disease)   • Brachial neuritis   • Cervical radiculopathy   • LOM (loss of memory)   • Anxiety   • Diabetes mellitus   • Lower back pain   • Essential hypertension   • History of CVA (cerebrovascular accident)   • Non morbid obesity due to excess calories   • Post-infarction pericarditis   • HCAP (healthcare-associated pneumonia)   • CVA (cerebral vascular accident)   • Left-sided weakness     Past Medical History:   Diagnosis Date   • Anxiety    • Arthritis    • Asthma    • BiPAP (biphasic positive airway pressure) dependence    • Brachial neuritis 2017   • Chest pain    • COPD (chronic obstructive pulmonary disease)    • Depression    • Diabetes mellitus    • Dizziness    • Edema    • GERD (gastroesophageal reflux disease)    • H/O chest x-ray 2015    Mild Left base atelectasis   • H/O echocardiogram 2015    Normal LVSF. EF of 60-65%. Grade 1 diastolic dysfunction of the LV myocardium. No evidence of pericardial effusion   • H/O exercise stress test    • History of  herniated intervertebral disc     History of left L5-S1 disc herniation   • LOM (loss of memory) 1/19/2017   • Lower back pain     Right   • Measles    • Obstructive sleep apnea    • Palpitations    • Pericardial effusion    • Seizure disorder    • Shortness of breath 1/19/2017   • SOB (shortness of breath)    • Stroke    • Wears glasses      Past Surgical History:   Procedure Laterality Date   • BACK SURGERY     • CARDIAC CATHETERIZATION     • CARDIAC CATHETERIZATION N/A 8/11/2017    Procedure: Coronary angiography;  Surgeon: Dallas Kim MD;  Location: [facility] CATH INVASIVE LOCATION;  Service:    • CARDIAC CATHETERIZATION N/A 8/11/2017    Procedure: Left Heart Cath;  Surgeon: Dallas Kim MD;  Location: [facility] CATH INVASIVE LOCATION;  Service:    • CARDIAC CATHETERIZATION N/A 8/11/2017    Procedure: Left ventriculography;  Surgeon: Dallas Kim MD;  Location: [facility] CATH INVASIVE LOCATION;  Service:    • CARDIOVASCULAR STRESS TEST  07/03/2017    WITH DR KIM AT [facility]   • CARPAL TUNNEL RELEASE Right    • CATARACT EXTRACTION W/ INTRAOCULAR LENS IMPLANT Right 6/12/2017    Procedure: CATARACT PHACO EXTRACTION WITH INTRAOCULAR LENS IMPLANT RIGHT ;  Surgeon: Natalie Cao MD;  Location: [facility] OR;  Service:    • CATARACT EXTRACTION W/ INTRAOCULAR LENS IMPLANT Left 7/10/2017    Procedure: CATARACT PHACO EXTRACTION WITH INTRAOCULAR LENS IMPLANT LEFT;  Surgeon: Natalie Cao MD;  Location: [facility] OR;  Service:    • CHOLECYSTECTOMY     • COLONOSCOPY     • CORONARY ANGIOPLASTY WITH STENT PLACEMENT     • CORONARY ANGIOPLASTY WITH STENT PLACEMENT      X1-LAD   • CORONARY ARTERY BYPASS GRAFT N/A 8/15/2017    Procedure: CORONARY ARTERY BYPASS GRAFTING x 3 UTILIZING THE LEFT INTERNAL MAMMARY ARTERY WITH ENDOSCOPIC VEIN HARVESTING OF THE RIGHT GREATER SAPHENOUS VEIN, HAMLET, LAD ENDARECTOMY;  Surgeon: London Damon MD;  Location: [facility];  Service:    • ENDOSCOPY     • EYE SURGERY      cataract surgery both eyes  "  • KNEE ARTHROSCOPY Bilateral    • NEUROPLASTY / TRANSPOSITION ULNAR NERVE AT ELBOW     • OTHER SURGICAL HISTORY      Foraminotomy and discectomy   • PERICARDIAL WINDOW N/A 8/25/2017    Procedure: PERICARDIAL WINDOW;  Surgeon: Freeman Phillips MD;  Location: [facility] OR;  Service:           OT ASSESSMENT FLOWSHEET (last 72 hours)      OT Evaluation       12/18/17 0838 12/18/17 0836 12/18/17 0800 12/17/17 2000       Rehab Evaluation    Document Type evaluation  - evaluation  -CL       Subjective Information agree to therapy;no complaints  - agree to therapy;no complaints  -CL       Patient Effort, Rehab Treatment excellent  -MJ excellent  -CL       Symptoms Noted During/After Treatment dizziness   reports feeling \"wonky\" in his head  - dizziness  -CL       Symptoms Noted Comment  VSS.   -CL       General Information    Patient Profile Review yes  -MJ yes  -CL       Onset of Illness/Injury or Date of Surgery Date 12/17/17  -MJ 12/17/17  -CL       Referring Physician ANTONIO Eisenberg  -ANTONIO Goldstein  -IVORY       General Observations Pt. in fowlers position in bed finishing breakfast tray.  No family present.  -        Pertinent History Of Current Problem Pt. was at [employer] when he started to demonstrate L facial droop and word salad.  Tyler luc to ED and pt. admitted to ICU.  Pt. given TPA.  - Pt presented to ED w/ L facial droop, L sided weakness, and slurred speech. CT head, CTP, and CTA head/neck (-) for acute changes. Pt s/p tPA. PMH: R RTC tear, CVA, CABG 08/2017  -CL       Precautions/Limitations fall precautions  - fall precautions  -CL       Prior Level of Function independent:;all household mobility;community mobility;gait;transfer;ADL's  - independent:;all household mobility;transfer;ADL's;dressing;bathing;work  -CL       Equipment Currently Used at Home bipap/ cpap;cane, straight;walker, rolling   STc and rwx  not used by patient  - cane, straight;walker, rolling  -CL  bipap/ cpap  -AS     " Plans/Goals Discussed With patient;agreed upon  -MJ patient;agreed upon  -CL       Risks Reviewed patient:;dizziness;LOB  -MJ patient:;LOB;nausea/vomiting;dizziness;increased discomfort;change in vital signs;lines disloged  -CL       Benefits Reviewed patient:;improve function;increase independence;increase strength;increase balance  -MJ patient:;improve function;increase strength;increase independence;increase balance;decrease pain;increase knowledge  -CL       Barriers to Rehab none identified  -MJ none identified  -CL       Living Environment    Lives With spouse  -MJ spouse  -CL  spouse  -AS     Living Arrangements house  -MJ house  -CL  house  -AS     Home Accessibility no concerns;bed and bath on same level;ramps present at home;stairs to enter home  -MJ ramps present at home   walk-in shower  -CL  no concerns  -AS     Number of Stairs to Enter Home 3  -MJ 3   also has ramp  -CL       Stair Railings at Home none  -MJ   none  -AS     Type of Financial/Environmental Concern    none  -AS     Transportation Available    family or friend will provide  -AS     Clinical Impression    Date of Referral to OT  12/17/17  -CL       OT Diagnosis  Decreased independence in ADLs.   -CL       Patient/Family Goals Statement  Return to OF.   -CL       Criteria for Skilled Therapeutic Interventions Met  yes;treatment indicated  -CL       Rehab Potential  good, to achieve stated therapy goals  -CL       Therapy Frequency  daily  -CL       Anticipated Equipment Needs At Discharge  --   TBA further  -CL       Anticipated Discharge Disposition  home with assist  -CL       Functional Level Prior    Ambulation    0-->independent  -AS     Transferring    0-->independent  -AS     Toileting    0-->independent  -AS     Bathing    0-->independent  -AS     Dressing    0-->independent  -AS     Eating    0-->independent  -AS     Communication    0-->understands/communicates without difficulty  -AS     Swallowing    0-->swallows  foods/liquids without difficulty  -AS     Prior Functional Level Comment    independent  -AS     Vital Signs    Pre Systolic BP Rehab 131  -  -CL       Pre Treatment Diastolic BP 81  -MJ 81  -CL       Post Systolic BP Rehab 146  -  -CL       Post Treatment Diastolic BP 83  -MJ 83  -CL       Pretreatment Heart Rate (beats/min) 90  -MJ 90  -CL       Posttreatment Heart Rate (beats/min) 100  -  -CL       Pre SpO2 (%) 96  -MJ 96  -CL       O2 Delivery Pre Treatment room air  -MJ room air  -CL       Post SpO2 (%) 97  -MJ 97  -CL       O2 Delivery Post Treatment room air  -MJ room air  -CL       Pre Patient Position Supine  -MJ Supine  -CL       Intra Patient Position Standing  -MJ Standing  -CL       Post Patient Position Sitting  -MJ Sitting  -CL       Pain Assessment    Pain Assessment John-Fuentes FACES  -MJ John-Fuentes FACES  -CL       John-Fuentes FACES Pain Rating 2  -MJ 2  -CL       Pain Type Acute pain  -MJ Acute pain  -CL       Pain Location Head  -MJ Head  -CL       Pain Intervention(s) Repositioned;Ambulation/increased activity  -MJ Repositioned;Ambulation/increased activity  -CL       Response to Interventions tolerated  -MJ Tolerated.   -CL       Vision Assessment/Intervention    Visual Impairment Comment  Noted decreased B peripheral vision, pt reports is baseline.   -CL       Visual Tracking Comment  Pt tracks appropriately in all directions.   -CL       Cognitive Assessment/Intervention    Current Cognitive/Communication Assessment functional  -MJ functional  -CL       Orientation Status oriented x 4  -MJ oriented x 4  -CL       Follows Commands/Answers Questions able to follow single-step instructions;100% of the time  -% of the time  -CL       Personal Safety mild impairment  -MJ mild impairment;decreased awareness, need for assist;decreased awareness, need for safety  -CL       Personal Safety Interventions fall prevention program maintained;gait belt;nonskid shoes/slippers when out  of bed  -MJ fall prevention program maintained;gait belt;nonskid shoes/slippers when out of bed  -CL       ROM (Range of Motion)    General ROM no range of motion deficits identified  -MJ no range of motion deficits identified  -CL       General ROM Detail pt. reports old rotator cuff injury/repair RUE  -MJ        MMT (Manual Muscle Testing)    General MMT Assessment no strength deficits identified  -MJ        General MMT Assessment Detail BLE hip flex 5/5, knee flex/ext 5/5, ankle DF/PF 5/5  -MJ BUE grossly 4-/5.   -CL       Bed Mobility, Assessment/Treatment    Bed Mobility, Assistive Device bed rails;head of bed elevated  -MJ head of bed elevated;bed rails  -CL       Bed Mobility, Roll Left, [place] independent  -MJ        Bed Mobility, Roll Right, [place] independent  -MJ        Bed Mobility, Scoot/Bridge, [place] independent  -MJ        Bed Mob, Supine to Sit, [place] independent  -MJ conditional independence  -CL       Bed Mob, Sit to Supine, [place] not tested   UIC  -MJ        Bed Mobility, Comment good technique noted with bed mobility   -MJ        Transfer Assessment/Treatment    Transfers, Sit-Stand [place] contact guard assist  -MJ contact guard assist  -CL       Transfers, Stand-Sit [place] contact guard assist  -MJ contact guard assist  -CL       Transfers, Sit-Stand-Sit, Assist Device rolling walker  -MJ        Transfer, Impairments impaired balance  -MJ        Transfer, Comment sit to stand from EOB and at bedside chair; good UE positioning throughout  -MJ        Functional Mobility    Functional Mobility- Ind. Level  contact guard assist;verbal cues required  -CL       Functional Mobility-Distance (Feet)  600  -CL       Functional Mobility- Comment  VCs for safety, pt c/o dizziness, however VSS throughout.   -CL       Motor Skills/Interventions    Additional Documentation Balance Skills Training (Group)  -MJ Balance Skills Training (Group);Fine Motor Coordination  Training (Group);Gross Motor Coordination Training (Group)  -CL       Balance Skills Training    Sitting-Level of Assistance Independent  -MJ Independent  -CL       Sitting-Balance Support Feet supported  -MJ Feet supported  -CL       Sitting-Balance Activities Trunk control activities  -MJ Forward lean;Reaching for objects;Trunk control activities  -CL       Standing-Level of Assistance Contact guard  -MJ Contact guard  -CL       Static Standing Balance Support assistive device   Good balance; progressed without rwx  -MJ assistive device  -CL       Standing-Balance Activities Weight Shift A-P  -MJ Weight Shift R-L;Weight Shift A-P  -CL       Gait Balance-Level of Assistance Contact guard;x2  -MJ Contact guard  -CL       Gait Balance Support No upper extremity supported  -MJ No upper extremity supported  -CL       Gait Balance Activities scanning environment R/L  -MJ scanning environment R/L;side-stepping  -CL       Gross Motor Coordination Training    Gross Motor Skill, Impairments Detail  BUE finger/nose eyes open/closed WFL.   -CL       Fine Motor Coordination Training    Opposition  Right:;Left:;intact  -CL       Sensory Assessment/Intervention    Light Touch LUE;RUE;LLE;RLE  -MJ LUE;RUE  -CL       LUE Light Touch WNL  -MJ WNL  -CL WNL  -AP      RUE Light Touch WNL  -MJ WNL  -CL WNL  -AP      LLE Light Touch WNL  -MJ  WNL  -AP      RLE Light Touch WNL  -MJ  WNL  -AP      Positioning and Restraints    Pre-Treatment Position in bed  -MJ in bed  -CL       Post Treatment Position chair  -MJ chair  -CL       In Chair notified nsg;call light within reach;encouraged to call for assist;exit alarm on;reclined  -MJ notified nsg;reclined;call light within reach;encouraged to call for assist;exit alarm on;legs elevated  -CL         User Key  (r) = Recorded By, (t) = Taken By, (c) = Cosigned By    Initials Name Effective Dates    AP Marlene Vines RN 06/16/16 -     AS Carole Garza RN 06/16/16 -     CL Rosita Medellin,  OT 06/08/16 -     DAYANNA Valentine, PT 10/30/17 -            Occupational Therapy Education     Title: PT OT SLP Therapies (Active)     Topic: Occupational Therapy (Active)     Point: ADL training (Active)    Description: Instruct learner(s) on proper safety adaptation and remediation techniques during self care or transfers.   Instruct in proper use of assistive devices.    Learning Progress Summary    Learner Readiness Method Response Comment Documented by Status   Patient Acceptance E,D NR Pt educated on appropriate safety precautions, t/f techniques, and role fo therapy.  12/18/17 1142 Active               Point: Precautions (Active)    Description: Instruct learner(s) on prescribed precautions during self-care and functional transfers.    Learning Progress Summary    Learner Readiness Method Response Comment Documented by Status   Patient Acceptance E,D NR Pt educated on appropriate safety precautions, t/f techniques, and role fo therapy.  12/18/17 1142 Active               Point: Body mechanics (Active)    Description: Instruct learner(s) on proper positioning and spine alignment during self-care, functional mobility activities and/or exercises.    Learning Progress Summary    Learner Readiness Method Response Comment Documented by Status   Patient Acceptance E,D NR Pt educated on appropriate safety precautions, t/f techniques, and role fo therapy.  12/18/17 1142 Active                      User Key     Initials Effective Dates Name Provider Type Discipline     06/08/16 -  Rosita Medellin, OT Occupational Therapist OT                  OT Recommendation and Plan  Anticipated Equipment Needs At Discharge:  (TBA further)  Anticipated Discharge Disposition: home with assist  Therapy Frequency: daily  Plan of Care Review  Plan Of Care Reviewed With: patient  Progress: progress toward functional goals as expected  Outcome Summary/Follow up Plan: OT completed a brief chart review relating to presenting physical  deficits. Pt CGA for t/fs and functional mobility, limited d/t c/o dizziness though VSS. Recommend cont skilled IPOT intervention. Recommend pt DC home w/ assist.           OT Goals       12/18/17 1142          Transfer Training OT LTG    Transfer Training OT LTG, Date Established 12/18/17  -CL      Transfer Training OT LTG, Time to Achieve by discharge  -CL      Transfer Training OT LTG, Activity Type toilet;sit to stand/stand to sit  -CL      Transfer Training OT LTG, [place] Level supervision required  -CL      Transfer Training OT LTG, Additional Goal AAD  -CL      LB Dressing OT LTG    LB Dressing Goal OT LTG, Date Established 12/18/17  -CL      LB Dressing Goal OT LTG, Time to Achieve by discharge  -CL      LB Dressing Goal OT LTG, Activity Type Don pants/socks  -CL      LB Dressing Goal OT LTG, [place] Level supervision required  -CL      LB Dressing Goal OT LTG, Additional Goal AAD  -CL      Functional Mobility OT LTG    Functional Mobility Goal OT LTG, Date Established 12/18/17  -CL      Functional Mobility Goal OT LTG, Time to Achieve by discharge  -CL      Functional Mobility Goal OT LTG, [place] Level standby assist  -CL      Functional Mobility Goal OT LTG, Assist Device appropriate AD  -CL      Functional Mobility Goal OT LTG, Distance to Achieve to the bathroom  -CL        User Key  (r) = Recorded By, (t) = Taken By, (c) = Cosigned By    Initials Name Provider Type    IVORY Medellin OT Occupational Therapist                Outcome Measures       12/18/17 0838 12/18/17 0836       How much help from another person do you currently need...    Turning from your back to your side while in flat bed without using bedrails? 4  -MJ      Moving from lying on back to sitting on the side of a flat bed without bedrails? 4  -MJ      Moving to and from a bed to a chair (including a wheelchair)? 3  -MJ      Standing up from a chair using your arms (e.g., wheelchair, bedside chair)? 3  -MJ       Climbing 3-5 steps with a railing? 3  -MJ      To walk in hospital room? 3  -MJ      AM-PAC 6 Clicks Score 20  -MJ      How much help from another is currently needed...    Putting on and taking off regular lower body clothing?  3  -CL     Bathing (including washing, rinsing, and drying)  3  -CL     Toileting (which includes using toilet bed pan or urinal)  4  -CL     Putting on and taking off regular upper body clothing  3  -CL     Taking care of personal grooming (such as brushing teeth)  3  -CL     Eating meals  3  -CL     Score  19  -CL     Modified [place]osa Scale    Modified Scottie Scale 1 - No significant disability despite symptoms.  Able to carry out all usual duties and activities.  -MJ 1 - No significant disability despite symptoms.  Able to carry out all usual duties and activities.  -CL     Functional Assessment    Outcome Measure Options AM-PAC 6 Clicks Basic Mobility (PT);Modified Coosa  -MJ AM-PAC 6 Clicks Daily Activity (OT)  -CL       User Key  (r) = Recorded By, (t) = Taken By, (c) = Cosigned By    Initials Name Provider Type    CL Rosita Medellin OT Occupational Therapist    DAYANNA Valentine, NARAYAN Physical Therapist          Time Calculation:   OT Start Time: 0836    Therapy Charges for Today     Code Description Service Date Service Provider Modifiers Qty    18523456201 HC OT EVAL LOW COMPLEXITY 4 12/18/2017 Rosita Medellin OT GO 1               Rosita Medellin OT  12/18/2017

## 2017-12-18 NOTE — PROGRESS NOTES
Intensive Care Follow-up      LOS: 1 day     Mr. Denzel Harding, 57 y.o. male is followed for: CVA (cerebral vascular accident)     Subjective - Interval History     Awake and alert.  Feels dramatically better.  Still feels he has some trouble with speech  Oxygenating well on room air  On no infusions    The patient's relevant past medical, surgical and social history were reviewed and updated in Epic as appropriate.     Objective     Infusions:    niCARdipine 5-15 mg/hr     Medications:    amLODIPine 5 mg Oral Daily   aspirin 325 mg Oral Daily   Or      aspirin 300 mg Rectal Daily   atorvastatin 80 mg Oral Nightly   gabapentin 300 mg Oral BID   insulin detemir 40 Units Subcutaneous Nightly   insulin lispro 0-24 Units Subcutaneous 4x Daily With Meals & Nightly   lisinopril 40 mg Oral Daily   metoprolol tartrate 50 mg Oral Q12H   montelukast 10 mg Oral Nightly   PATIENT SUPPLIED MEDICATION 10 mg Oral Daily   ranolazine 500 mg Oral BID   topiramate 100 mg Oral Daily     Intake/Output       12/17/17 0700 - 12/18/17 0659 12/18/17 0700 - 12/19/17 0659    Intake (ml) 480 560    Output (ml) 2250 1350    Net (ml) -1770 -790    Last Weight 115 kg (254 lb 4.8 oz) --        Vital Sign Min/Max for last 24 hours  Temp  Min: 97.9 °F (36.6 °C)  Max: 99.3 °F (37.4 °C)   BP  Min: 107/76  Max: 149/80   Pulse  Min: 69  Max: 93   Resp  Min: 16  Max: 18   SpO2  Min: 92 %  Max: 99 %   No Data Recorded        Physical Exam:   GENERAL: Awake, no distress   HEENT: No adenopathy or thyromegaly   LUNGS: Clear without wheezes or crackles   HEART: Regular rate and rhythm without murmurs   ABDOMEN: Soft, nontender   EXTREMITIES: No edema or cyanosis   NEURO/PSYCH: Awake and alert.  Appears grossly neurologically intact without overt deficit.      Results from last 7 days  Lab Units 12/18/17  0359 12/17/17  1417 12/17/17  1416   WBC 10*3/mm3 7.33 8.07  --    HEMOGLOBIN g/dL 13.1 13.9  --    HEMOGLOBIN, POC g/dL  --   --  14.3   PLATELETS  10*3/mm3 171 183  --        Results from last 7 days  Lab Units 12/18/17  0359 12/17/17  1416   SODIUM mmol/L 137  --    POTASSIUM mmol/L 3.8  --    CO2 mmol/L 24.0  --    BUN mg/dL 13  --    CREATININE mg/dL 0.80 1.00   GLUCOSE mg/dL 214*  --      Estimated Creatinine Clearance: 131 mL/min (by C-G formula based on Cr of 0.8).      Results from last 7 days  Lab Units 12/18/17  0359   HEMOGLOBIN A1C % 10.20*           Lab Results   Component Value Date    LACTATE 1.7 11/27/2015          Images: Prior CT scan of the head reviewed and negative.  Follow-up CT scan of the head pending    I reviewed the patient's results and images.     Impression      Hospital Problem List     * (Principal)CVA (S/P tPA)    Coronary artery disease involving native coronary artery of native heart with unstable angina pectoris    Overview Signed 8/15/2017  3:38 PM by LARRY Isaac     1. [facility] 8-11-17:  · Severe three-vessel coronary artery disease  · Preserved global and regional left ventricular systolic function  · Elevated left ventricular filling pressures consistent with diastolic heart failure.  2. CABG x 3 (8-15-17):         Hypercholesterolemia    Overview Signed 11/2/2016  9:34 AM by Shannon Torres      Assessed By: Yasmin Millard (Cardiology); Last Assessed: 13 Aug 2015         GERD (gastroesophageal reflux disease)    Diabetes mellitus    Essential hypertension    History of CVA (cerebrovascular accident)    Left-sided weakness               Plan        Follow-up CT scan of the head in 24 hours  Review and restart most home medications  Blood sugar control  Physical therapy, occupational therapy, and speech therapy consultations    Plan of care and goals reviewed with mulitdisciplinary team at daily rounds   I discussed the patient's findings and my recommendations with patient and nursing staff       BERE Hicks MD  Pulmonary and Critical Care Medicine

## 2017-12-18 NOTE — THERAPY EVALUATION
Acute Care - Physical Therapy Initial Evaluation  [facility]     Patient Name: Denzel Harding  : 1960  MRN: [id number]  Today's Date: 2017   Onset of Illness/Injury or Date of Surgery Date: 17  Date of Referral to PT: 17  Referring Physician: ANTONIO Eisenberg      Admit Date: 2017     Visit Dx:    ICD-10-CM ICD-9-CM   1. Left-sided weakness R53.1 728.87   2. Paresthesia of left arm and leg R20.2 782.0   3. Impaired functional mobility, balance, gait, and endurance Z74.09 V49.89   4. Impaired mobility and ADLs Z74.09 799.89     Patient Active Problem List   Diagnosis   • Coronary artery disease involving native coronary artery of native heart with unstable angina pectoris   • Lumbar radiculopathy   • Hypercholesterolemia   • Diabetic polyneuropathy associated with type 2 diabetes mellitus   • Migraine with aura and with status migrainosus   • Palpitations   • Seizure disorder   • GERD (gastroesophageal reflux disease)   • Brachial neuritis   • Cervical radiculopathy   • LOM (loss of memory)   • Anxiety   • Diabetes mellitus   • Lower back pain   • Essential hypertension   • History of CVA (cerebrovascular accident)   • Non morbid obesity due to excess calories   • Post-infarction pericarditis   • HCAP (healthcare-associated pneumonia)   • CVA (cerebral vascular accident)   • Left-sided weakness     Past Medical History:   Diagnosis Date   • Anxiety    • Arthritis    • Asthma    • BiPAP (biphasic positive airway pressure) dependence    • Brachial neuritis 2017   • Chest pain    • COPD (chronic obstructive pulmonary disease)    • Depression    • Diabetes mellitus    • Dizziness    • Edema    • GERD (gastroesophageal reflux disease)    • H/O chest x-ray 2015    Mild Left base atelectasis   • H/O echocardiogram 2015    Normal LVSF. EF of 60-65%. Grade 1 diastolic dysfunction of the LV myocardium. No evidence of pericardial effusion   • H/O exercise stress test    •  History of herniated intervertebral disc     History of left L5-S1 disc herniation   • LOM (loss of memory) 1/19/2017   • Lower back pain     Right   • Measles    • Obstructive sleep apnea    • Palpitations    • Pericardial effusion    • Seizure disorder    • Shortness of breath 1/19/2017   • SOB (shortness of breath)    • Stroke    • Wears glasses      Past Surgical History:   Procedure Laterality Date   • BACK SURGERY     • CARDIAC CATHETERIZATION     • CARDIAC CATHETERIZATION N/A 8/11/2017    Procedure: Coronary angiography;  Surgeon: Dallas Kim MD;  Location: [facility] CATH INVASIVE LOCATION;  Service:    • CARDIAC CATHETERIZATION N/A 8/11/2017    Procedure: Left Heart Cath;  Surgeon: Dallas Kim MD;  Location: [facility] CATH INVASIVE LOCATION;  Service:    • CARDIAC CATHETERIZATION N/A 8/11/2017    Procedure: Left ventriculography;  Surgeon: Dallas Kim MD;  Location: [facility] CATH INVASIVE LOCATION;  Service:    • CARDIOVASCULAR STRESS TEST  07/03/2017    WITH DR KIM AT [facility]   • CARPAL TUNNEL RELEASE Right    • CATARACT EXTRACTION W/ INTRAOCULAR LENS IMPLANT Right 6/12/2017    Procedure: CATARACT PHACO EXTRACTION WITH INTRAOCULAR LENS IMPLANT RIGHT ;  Surgeon: Natalie Cao MD;  Location: [facility] OR;  Service:    • CATARACT EXTRACTION W/ INTRAOCULAR LENS IMPLANT Left 7/10/2017    Procedure: CATARACT PHACO EXTRACTION WITH INTRAOCULAR LENS IMPLANT LEFT;  Surgeon: Natalie Cao MD;  Location: [facility] OR;  Service:    • CHOLECYSTECTOMY     • COLONOSCOPY     • CORONARY ANGIOPLASTY WITH STENT PLACEMENT     • CORONARY ANGIOPLASTY WITH STENT PLACEMENT      X1-LAD   • CORONARY ARTERY BYPASS GRAFT N/A 8/15/2017    Procedure: CORONARY ARTERY BYPASS GRAFTING x 3 UTILIZING THE LEFT INTERNAL MAMMARY ARTERY WITH ENDOSCOPIC VEIN HARVESTING OF THE RIGHT GREATER SAPHENOUS VEIN, HAMLET, LAD ENDARECTOMY;  Surgeon: London Damon MD;  Location: [facility] OR;  Service:    • ENDOSCOPY     • EYE SURGERY      cataract surgery  "both eyes   • KNEE ARTHROSCOPY Bilateral    • NEUROPLASTY / TRANSPOSITION ULNAR NERVE AT ELBOW     • OTHER SURGICAL HISTORY      Foraminotomy and discectomy   • PERICARDIAL WINDOW N/A 8/25/2017    Procedure: PERICARDIAL WINDOW;  Surgeon: Freeman Phillips MD;  Location: [facility] OR;  Service:           PT ASSESSMENT (last 72 hours)      PT Evaluation       12/18/17 0838 12/18/17 0836    Rehab Evaluation    Document Type evaluation  -MJ evaluation  -CL    Subjective Information agree to therapy;no complaints  -MJ agree to therapy;no complaints  -CL    Patient Effort, Rehab Treatment excellent  -MJ excellent  -CL    Symptoms Noted During/After Treatment dizziness   reports feeling \"wonky\" in his head  -MJ dizziness  -CL    Symptoms Noted Comment  VSS.   -CL    General Information    Patient Profile Review yes  -MJ yes  -CL    Onset of Illness/Injury or Date of Surgery Date 12/17/17  -MJ 12/17/17  -CL    Referring Physician ANTONIO Eisenberg  -ANTONIO Goldstein  -IVORY    General Observations Pt. in fowlers position in bed finishing breakfast tray.  No family present.  -     Pertinent History Of Current Problem Pt. was at [employer] when he started to demonstrate L facial droop and word salad.  Traceeer luc to ED and pt. admitted to ICU.  Pt. given TPA.  - Pt presented to ED w/ L facial droop, L sided weakness, and slurred speech. CT head, CTP, and CTA head/neck (-) for acute changes. Pt s/p tPA. PMH: R RTC tear, CVA, CABG 08/2017  -CL    Precautions/Limitations fall precautions  - fall precautions  -CL    Prior Level of Function independent:;all household mobility;community mobility;gait;transfer;ADL's  - independent:;all household mobility;transfer;ADL's;dressing;bathing;work  -CL    Equipment Currently Used at Home bipap/ cpap;cane, straight;walker, rolling   STc and rwx  not used by patient  - cane, straight;walker, rolling  -CL    Plans/Goals Discussed With patient;agreed upon  -MJ patient;agreed upon  -CL    Risks " Reviewed patient:;dizziness;LOB  -MJ patient:;LOB;nausea/vomiting;dizziness;increased discomfort;change in vital signs;lines disloged  -CL    Benefits Reviewed patient:;improve function;increase independence;increase strength;increase balance  -MJ patient:;improve function;increase strength;increase independence;increase balance;decrease pain;increase knowledge  -CL    Barriers to Rehab none identified  -MJ none identified  -CL    Living Environment    Lives With spouse  -MJ spouse  -CL    Living Arrangements house  -MJ house  -CL    Home Accessibility no concerns;bed and bath on same level;ramps present at home;stairs to enter home  -MJ ramps present at home   walk-in shower  -CL    Number of Stairs to Enter Home 3  -MJ 3   also has ramp  -CL    Stair Railings at Home none  -MJ     Clinical Impression    Date of Referral to PT 12/17/17  -MJ     PT Diagnosis impaired function and mobility, endurance and balance  -MJ     Patient/Family Goals Statement to return to [facility]  -     Criteria for Skilled Therapeutic Interventions Met yes;treatment indicated  -     Rehab Potential good, to achieve stated therapy goals  -     Vital Signs    Pre Systolic BP Rehab 131  -  -CL    Pre Treatment Diastolic BP 81  -MJ 81  -CL    Post Systolic BP Rehab 146  -  -CL    Post Treatment Diastolic BP 83  -MJ 83  -CL    Pretreatment Heart Rate (beats/min) 90  -MJ 90  -CL    Posttreatment Heart Rate (beats/min) 100  -  -CL    Pre SpO2 (%) 96  -MJ 96  -CL    O2 Delivery Pre Treatment room air  -MJ room air  -CL    Post SpO2 (%) 97  -MJ 97  -CL    O2 Delivery Post Treatment room air  -MJ room air  -CL    Pre Patient Position Supine  -MJ Supine  -CL    Intra Patient Position Standing  -MJ Standing  -CL    Post Patient Position Sitting  -MJ Sitting  -CL    Pain Assessment    Pain Assessment John-Fuentes FACES  -MJ John-Fuentes FACES  -CL    John-Fuentes FACES Pain Rating 2  -MJ 2  -CL    Pain Type Acute pain  -MJ Acute pain  -CL     Pain Location Head  -MJ Head  -CL    Pain Intervention(s) Repositioned;Ambulation/increased activity  -MJ Repositioned;Ambulation/increased activity  -CL    Response to Interventions tolerated  -MJ Tolerated.   -CL    Vision Assessment/Intervention    Visual Impairment Comment  Noted decreased B peripheral vision, pt reports is baseline.   -CL    Visual Tracking Comment  Pt tracks appropriately in all directions.   -CL    Cognitive Assessment/Intervention    Current Cognitive/Communication Assessment functional  -MJ functional  -CL    Orientation Status oriented x 4  -MJ oriented x 4  -CL    Follows Commands/Answers Questions able to follow single-step instructions;100% of the time  -% of the time  -CL    Personal Safety mild impairment  -MJ mild impairment;decreased awareness, need for assist;decreased awareness, need for safety  -CL    Personal Safety Interventions fall prevention program maintained;gait belt;nonskid shoes/slippers when out of bed  -MJ fall prevention program maintained;gait belt;nonskid shoes/slippers when out of bed  -CL    ROM (Range of Motion)    General ROM no range of motion deficits identified  -MJ no range of motion deficits identified  -CL    General ROM Detail pt. reports old rotator cuff injury/repair RUE  -MJ     MMT (Manual Muscle Testing)    General MMT Assessment no strength deficits identified  -MJ     General MMT Assessment Detail BLE hip flex 5/5, knee flex/ext 5/5, ankle DF/PF 5/5  -MJ BUE grossly 4-/5.   -CL    Bed Mobility, Assessment/Treatment    Bed Mobility, Assistive Device bed rails;head of bed elevated  -MJ head of bed elevated;bed rails  -CL    Bed Mobility, Roll Left, [place] independent  -MJ     Bed Mobility, Roll Right, [place] independent  -MJ     Bed Mobility, Scoot/Bridge, [place] independent  -MJ     Bed Mob, Supine to Sit, [place] independent  -MJ conditional independence  -CL    Bed Mob, Sit to Supine, [place] not tested   [facility]   -MJ     Bed Mobility, Comment good technique noted with bed mobility   -MJ     Transfer Assessment/Treatment    Transfers, Sit-Stand [place] contact guard assist  -MJ contact guard assist  -CL    Transfers, Stand-Sit [place] contact guard assist  -MJ contact guard assist  -CL    Transfers, Sit-Stand-Sit, Assist Device rolling walker  -MJ     Transfer, Impairments impaired balance  -MJ     Transfer, Comment sit to stand from EOB and at bedside chair; good UE positioning throughout  -MJ     Gait Assessment/Treatment    Gait, [place] Level contact guard assist  -MJ     Gait, Assistive Device other (see comments)   gait belt  -MJ     Gait, Distance (Feet) 600  -MJ     Gait, Gait Pattern Analysis swing-through gait  -MJ     Gait, Gait Deviations han decreased  -MJ     Gait, Impairments impaired balance  -MJ     Motor Skills/Interventions    Additional Documentation Balance Skills Training (Group)  -MJ Balance Skills Training (Group);Fine Motor Coordination Training (Group);Gross Motor Coordination Training (Group)  -    Balance Skills Training    Sitting-Level of Assistance Independent  -MJ Independent  -CL    Sitting-Balance Support Feet supported  -MJ Feet supported  -CL    Sitting-Balance Activities Trunk control activities  -MJ Forward lean;Reaching for objects;Trunk control activities  -CL    Standing-Level of Assistance Contact guard  -MJ Contact guard  -CL    Static Standing Balance Support assistive device   Good balance; progressed without rwx  -MJ assistive device  -CL    Standing-Balance Activities Weight Shift A-P  -MJ Weight Shift R-L;Weight Shift A-P  -CL    Gait Balance-Level of Assistance Contact guard;x2  -MJ Contact guard  -CL    Gait Balance Support No upper extremity supported  -MJ No upper extremity supported  -CL    Gait Balance Activities scanning environment R/L  - scanning environment R/L;side-stepping  -CL    Sensory Assessment/Intervention    Light Touch LUE;RUE;LLE;RLE   -MJ LUE;RUE  -CL    LUE Light Touch WNL  -MJ WNL  -CL    RUE Light Touch WNL  -MJ WNL  -CL    LLE Light Touch WNL  -MJ     RLE Light Touch WNL  -MJ     Positioning and Restraints    Pre-Treatment Position in bed  -MJ in bed  -CL    Post Treatment Position chair  -MJ chair  -CL    In Chair notified nsg;call light within reach;encouraged to call for assist;exit alarm on;reclined  -MJ notified nsg;reclined;call light within reach;encouraged to call for assist;exit alarm on;legs elevated  -CL      12/18/17 0800 12/17/17 2000    General Information    Equipment Currently Used at Home  bipap/ cpap  -AS    Living Environment    Lives With  spouse  -AS    Living Arrangements  house  -AS    Home Accessibility  no concerns  -AS    Stair Railings at Home  none  -AS    Type of Financial/Environmental Concern  none  -AS    Transportation Available  family or friend will provide  -AS    Sensory Assessment/Intervention    LUE Light Touch WNL  -AP     RUE Light Touch WNL  -AP     LLE Light Touch WNL  -AP     RLE Light Touch WNL  -AP       User Key  (r) = Recorded By, (t) = Taken By, (c) = Cosigned By    Initials Name Provider Type    AP Marlene Vines, RN Registered Nurse    AS Carole Garza RN Registered Nurse    IVORY [place] OT Occupational Therapist    DAYANNA Valentine, PT Physical Therapist          Physical Therapy Education     Title: PT OT SLP Therapies (Active)     Topic: Physical Therapy (Done)     Point: Mobility training (Done)    Learning Progress Summary    Learner Readiness Method Response Comment Documented by Status   Patient Acceptance E [facility] 12/18/17 0920 Done               Point: Home exercise program (Done)    Learning Progress Summary    Learner Readiness Method Response Comment Documented by Status   Patient Acceptance E [facility] 12/18/17 0920 Done               Point: Body mechanics (Done)    Learning Progress Summary    Learner Readiness Method Response Comment Documented by Status   Patient  Acceptance E [facility]   12/18/17 0920 Done               Point: Precautions (Done)    Learning Progress Summary    Learner Readiness Method Response Comment Documented by Status   Patient Acceptance E [facility]   12/18/17 0920 Done                      User Key     Initials Effective Dates Name Provider Type Discipline     10/30/17 -  Marianna Valentine, PT Physical Therapist PT                PT Recommendation and Plan  Anticipated Discharge Disposition: home with assist  Planned Therapy Interventions: balance training, home exercise program, strengthening, transfer training  PT Frequency: daily  Plan of Care Review  Plan Of Care Reviewed With: patient  Outcome Summary/Follow up Plan: pt. presents with stable signs and symptoms to include decreased dynamic standing balance, gait mechanics and endurance.  Pt. will benefit from skilled PT to progress independence with mobility.  Recommending home with assist at time of discharge.          IP PT Goals       12/18/17 0921          Transfer Training PT LTG    Transfer Training PT LTG, Date Established 12/18/17  -MJ      Transfer Training PT LTG, Time to Achieve 2 wks  -MJ      Transfer Training PT LTG, Activity Type all transfers  -MJ      Transfer Training PT LTG, [place] Level independent  -MJ      Gait Training PT LTG    Gait Training Goal PT LTG, Date Established 12/18/17  -MJ      Gait Training Goal PT LTG, Time to Achieve 2 wks  -MJ      Gait Training Goal PT LTG, [place] Level independent  -MJ      Gait Training Goal PT LTG, Distance to Achieve 600'  -MJ      Stair Training PT LTG    Stair Training Goal PT LTG, Date Established 12/18/17  -MJ      Stair Training Goal PT LTG, Time to Achieve 2 wks  -MJ      Stair Training Goal PT LTG, Number of Steps 3  -MJ      Stair Training Goal PT LTG, [place] Level supervision required  -MJ      Stair Training Goal PT LTG, Assist Device 1 handrail  -MJ        User Key  (r) = Recorded By, (t) = Taken By, (c) = Cosigned By     Initials Name Provider Type    DAYANNA Valentine, PT Physical Therapist                Outcome Measures       12/18/17 0838          How much help from another person do you currently need...    Turning from your back to your side while in flat bed without using bedrails? 4  -MJ      Moving from lying on back to sitting on the side of a flat bed without bedrails? 4  -MJ      Moving to and from a bed to a chair (including a wheelchair)? 3  -MJ      Standing up from a chair using your arms (e.g., wheelchair, bedside chair)? 3  -MJ      Climbing 3-5 steps with a railing? 3  -MJ      To walk in hospital room? 3  -MJ      AM-PAC 6 Clicks Score 20  -MJ      Modified Scottie Scale    Modified Angelina Scale 1 - No significant disability despite symptoms.  Able to carry out all usual duties and activities.  -      Functional Assessment    Outcome Measure Options AM-PAC 6 Clicks Basic Mobility (PT);Modified Scottie  -MJ        User Key  (r) = Recorded By, (t) = Taken By, (c) = Cosigned By    Initials Name Provider Type    DAYANNA Valentine, NARAYAN Physical Therapist           Time Calculation:         PT Charges       12/18/17 0926          Time Calculation    Start Time 0838  -MJ      PT Received On 12/18/17  -      PT Goal Re-Cert Due Date 12/28/17  -        User Key  (r) = Recorded By, (t) = Taken By, (c) = Cosigned By    Initials Name Provider Type    DAYANNA Valentine, NARAYAN Physical Therapist          Therapy Charges for Today     Code Description Service Date Service Provider Modifiers Qty    [id number] HC PT EVAL LOW COMPLEXITY 4 12/18/2017 Marianna Valentine, PT GP 1          PT G-Codes  Outcome Measure Options: AM-PAC 6 Clicks Basic Mobility (PT), Modified Scottie      Marianna Valentine, PT  12/18/2017

## 2017-12-18 NOTE — PAYOR COMM NOTE
"Denzel Harding (57 y.o. Male)     Date of Birth Social Security Number Address Home Phone MRN    1960  [address] [facility] [id number] [phone] [id number]    Confucianism Marital Status          None        Admission Date Admission Type Admitting Provider Attending Provider Department, Room/Bed    17 Emergency Peter Perez MD Tzouanakis, Alexander E, MD [facility] 2B ICU, N229/1    Discharge Date Discharge Disposition Discharge Destination                      Attending Provider: Peter Perez MD     Allergies:  Sulfa Antibiotics, Codeine, Invokana [Canagliflozin], Morphine    Isolation:  None   Infection:  None   Code Status:  FULL    Ht:  177.8 cm (70\")   Wt:  115 kg (254 lb 4.8 oz)    Admission Cmt:  None   Principal Problem:  CVA (cerebral vascular accident) [I63.9]                 Active Insurance as of 2017     Primary Coverage     Payor Plan Insurance Group Employer/Plan Group    [facility] MEDICARE REPLACEMENT ANTH MEDICARE ADVANTAGE [id number]     Payor Plan Address Payor Plan Phone Number Effective From Effective To    [address] [phone] 2016     [place] [address]       Subscriber Name Subscriber Birth Date Member ID       DENZEL HARDING 1960 [id number]                 Emergency Contacts      (Rel.) Home Phone Work Phone Mobile Phone    Carol Harding (Spouse) [phone] -- --               History & Physical      Peter Perez MD at 2017  6:06 PM                                                                   Critical Care History & Physical    Patient name: Denzel Harding  CSN: [id number]  MRN: [id number]  : 1960  Today's date: 2017    Primary Care Physician: Ottoniel Toledo MD    Chief complaint:  Left facial droop, word salad, left sided weakness    HPI:  Mr. Harding is a 56 y/o WM who presented to the ED with c/o left facial droop, word salad and " left sided weakness that started around 11 am at [employer].  He noticed he was having a hard time singing at [employer] and then his daughter noticed his facial droop and word salad.  His daughter brought him to the ED.  CT Head, CTA Head/Neck and CT Cerebral Perfusion were normal.  He was given TPA at 1420 and it was stopped at 1525.  His temp was 99.3, P 85, 143/69, R 18 and room air sat 97%.  He had a WBC 8,  Hgb 13.9, HCT 41.3, plts 183, PT 13.1, INR 1.1, BUN 18, Cr 1.0, K 4.0, Na 138.  He will be admitted into the ICU and started on the post TPA protocol.  Neurology has seen him in the emergency department and will follow.    PMH:   Past Medical History:   Diagnosis Date   • Anxiety    • Arthritis    • Asthma    • BiPAP (biphasic positive airway pressure) dependence    • Brachial neuritis 1/19/2017   • Chest pain    • COPD (chronic obstructive pulmonary disease)    • Depression    • Diabetes mellitus    • Dizziness    • Edema    • GERD (gastroesophageal reflux disease)    • H/O chest x-ray 07/04/2015    Mild Left base atelectasis   • H/O echocardiogram 07/05/2015    Normal LVSF. EF of 60-65%. Grade 1 diastolic dysfunction of the LV myocardium. No evidence of pericardial effusion   • H/O exercise stress test    • History of herniated intervertebral disc     History of left L5-S1 disc herniation   • LOM (loss of memory) 1/19/2017   • Lower back pain     Right   • Measles    • Obstructive sleep apnea    • Palpitations    • Pericardial effusion    • Seizure disorder    • Shortness of breath 1/19/2017   • SOB (shortness of breath)    • Stroke    • Wears glasses      PSH:   Past Surgical History:   Procedure Laterality Date   • BACK SURGERY     • CARDIAC CATHETERIZATION     • CARDIAC CATHETERIZATION N/A 8/11/2017    Procedure: Coronary angiography;  Surgeon: Dallas Kim MD;  Location: [facility] CATH INVASIVE LOCATION;  Service:    • CARDIAC CATHETERIZATION N/A 8/11/2017    Procedure: Left Heart Cath;  Surgeon: Dallas HOWE  MD Judy;  Location: [facility] CATH INVASIVE LOCATION;  Service:    • CARDIAC CATHETERIZATION N/A 8/11/2017    Procedure: Left ventriculography;  Surgeon: Dallas Hernandez MD;  Location: [facility] CATH INVASIVE LOCATION;  Service:    • CARDIOVASCULAR STRESS TEST  07/03/2017    WITH DR HERNANDEZ AT [facility]   • CARPAL TUNNEL RELEASE Right    • CATARACT EXTRACTION W/ INTRAOCULAR LENS IMPLANT Right 6/12/2017    Procedure: CATARACT PHACO EXTRACTION WITH INTRAOCULAR LENS IMPLANT RIGHT ;  Surgeon: Natalie Cao MD;  Location: [facility] OR;  Service:    • CATARACT EXTRACTION W/ INTRAOCULAR LENS IMPLANT Left 7/10/2017    Procedure: CATARACT PHACO EXTRACTION WITH INTRAOCULAR LENS IMPLANT LEFT;  Surgeon: Natalie Cao MD;  Location: [facility] OR;  Service:    • CHOLECYSTECTOMY     • COLONOSCOPY     • CORONARY ANGIOPLASTY WITH STENT PLACEMENT     • CORONARY ANGIOPLASTY WITH STENT PLACEMENT      X1-LAD   • CORONARY ARTERY BYPASS GRAFT N/A 8/15/2017    Procedure: CORONARY ARTERY BYPASS GRAFTING x 3 UTILIZING THE LEFT INTERNAL MAMMARY ARTERY WITH ENDOSCOPIC VEIN HARVESTING OF THE RIGHT GREATER SAPHENOUS VEIN, HAMLET, LAD ENDARECTOMY;  Surgeon: London Damon MD;  Location: [facility] OR;  Service:    • ENDOSCOPY     • EYE SURGERY      cataract surgery both eyes   • KNEE ARTHROSCOPY Bilateral    • NEUROPLASTY / TRANSPOSITION ULNAR NERVE AT ELBOW     • OTHER SURGICAL HISTORY      Foraminotomy and discectomy   • PERICARDIAL WINDOW N/A 8/25/2017    Procedure: PERICARDIAL WINDOW;  Surgeon: Freeman Phillips MD;  Location: [facility] OR;  Service:      PFH:   Family History   Problem Relation Age of Onset   • Hypertension Mother    • Arthritis Mother    • Alcohol abuse Father    • Heart disease Other    • Stroke Other    • Hypertension Other    • Other Other      Neurologic disorder   • Parkinsonism Other    • Stroke Other    • Heart disease Other    • Hypertension Other    • Heart disease Other    • Stroke Other    • Hypertension Other      SH:   Social History      Social History   • Marital status:      Spouse name: N/A   • Number of children: 1   • Years of education: N/A     Occupational History   • [employer]      Disabled since 2002-Back Problems     Social History Main Topics   • Smoking status: Former Smoker     Packs/day: 1.00     Years: 10.00     Types: Cigarettes     Quit date: 1/1/1998   • Smokeless tobacco: Former User     Types: Snuff     Quit date: 5/24/2017   • Alcohol use Yes      Comment: 3 PER YEAR   • Drug use: No   • Sexual activity: Defer     Other Topics Concern   • None     Social History Narrative    Caffeine use: 0 serving daily.    Patient lives at home with wife.          Allergies:   Allergies   Allergen Reactions   • Sulfa Antibiotics Anaphylaxis, Nausea And Vomiting and Delirium   • Codeine Nausea And Vomiting     Can only take with Phenergan   • Invokana [Canagliflozin]      DKA   • Morphine      Does not work. It causes pain     Code Status:  Full Code    Home Medications:  Prescriptions Prior to Admission   Medication Sig Dispense Refill Last Dose   • albuterol (PROVENTIL HFA;VENTOLIN HFA) 108 (90 BASE) MCG/ACT inhaler Inhale 2 puffs Every 4 (Four) Hours As Needed. ProAir  (90 Base) MCG/ACT Inhalation Aerosol Solution; Patient Sig: ProAir  (90 Base) MCG/ACT Inhalation Aerosol Solution ; 8; 0; 05-Oct-2015; Active   Taking   • amLODIPine (NORVASC) 5 MG tablet Take 1 tablet by mouth Daily. 90 tablet 1 Taking   • aspirin 81 MG EC tablet Take 1 tablet by mouth Daily.      • atorvastatin (LIPITOR) 40 MG tablet Take 1 tablet by mouth Every Night. 90 tablet 1 Taking   • Cetirizine HCl 10 MG capsule Take 1 capsule by mouth daily.   Taking   • colchicine 0.6 MG tablet Take 1 tablet by mouth Daily. 30 tablet 1    • famotidine (PEPCID) 20 MG tablet Take 1 tablet by mouth Every Night. 30 tablet 0    • flunisolide (NASALIDE) 25 MCG/ACT (0.025%) solution nasal spray Inhale 1 spray Every 12 (Twelve) Hours As Needed  (nasal congestion).   Taking   • furosemide (LASIX) 40 MG tablet One tablet on Sundays, Tuesdays, and Thursdays (Patient taking differently: 40 mg Daily.) 15 tablet 1 Taking   • gabapentin (NEURONTIN) 300 MG capsule Take 1 capsule by mouth 2 (Two) Times a Day. 60 capsule 5 Taking   • HUMALOG KWIKPEN 100 UNIT/ML solution pen-injector Inject 1 dose as directed Take As Directed. Follows [facility]SI From PCP  0 Taking   • hydrOXYzine (ATARAX) 25 MG tablet Take 2 tablets by mouth Every 6 (Six) Hours As Needed for Itching. 60 tablet 0    • insulin lispro (humaLOG) 100 UNIT/ML injection Inject 40 Units under the skin 3 (Three) Times a Day Before Meals.   Taking   • Insulin Pen Needle 31G X 5 MM misc    Taking   • lisinopril (PRINIVIL,ZESTRIL) 40 MG tablet Take 1 tablet by mouth daily.   Taking   • metoprolol tartrate (LOPRESSOR) 50 MG tablet Take 1 tablet by mouth Every 12 (Twelve) Hours. 180 tablet 1 Taking   • Misc Natural Products (TUMERSAID) tablet Take 1 tablet by mouth Daily.   Taking   • montelukast (SINGULAIR) 10 MG tablet take 1 tablet by mouth at bedtime 30 tablet 2 Taking   • omeprazole (PriLOSEC) 20 MG capsule Take 1 capsule by mouth daily.   Taking   • ondansetron (ZOFRAN) 4 MG tablet Take 1 tablet by mouth Every 8 (Eight) Hours As Needed for Nausea. 20 tablet 0    • potassium chloride (K-DUR,KLOR-CON) 20 MEQ CR tablet One tablet on Sundays, Tuesdays, and Thursdays 15 tablet 1 Taking   • ranolazine (RANEXA) 500 MG 12 hr tablet Take 1 tablet by mouth 2 (Two) Times a Day. 60 tablet 11 Taking   • topiramate (TOPAMAX) 100 MG tablet Take 100 mg by mouth Daily.   Taking   • TOUJEO SOLOSTAR 300 UNIT/ML solution pen-injector Inject 80 Units as directed Every Night.  0 Taking   • Vortioxetine HBr (TRINTELLIX) 10 MG tablet Take 10 mg by mouth Daily.   Taking   • azithromycin (ZITHROMAX) 250 MG tablet Take 1 tablet by mouth Daily. Take 1 tablet daily for 4 days. 4 tablet 0    • HYDROcod Polst-CPM Polst ER (TUSSIONEX PENNKINETIC  "ER) 10-8 MG/5ML ER suspension Take 5 mL by mouth Every 12 (Twelve) Hours As Needed for Cough. 100 mL 0    • predniSONE (DELTASONE) 20 MG tablet Take 2 tablets by mouth Daily. 5 tablet 0      Review of Systems  Negative systems except for what is noted in HPI     Vital Signs:  Blood pressure 139/79, pulse 85, temperature 99.3 °F (37.4 °C), temperature source Oral, resp. rate 18, height 177.8 cm (70\"), weight 116 kg (255 lb), SpO2 95 %.    Objective:  General Appearance:  In no acute distress.    Vital signs: (most recent): Blood pressure 139/79, pulse 85, temperature 98.9 °F (37.2 °C), temperature source Oral, resp. rate 18, height 177.8 cm (70\"), weight 115 kg (254 lb 4.8 oz), SpO2 95 %.    HEENT: Normal HEENT exam.    Lungs:  Normal respiratory rate and normal effort.  He is not in respiratory distress.  Breath sounds clear to auscultation.  No wheezes, rales or rhonchi.    Heart: Normal rate.  Regular rhythm.  S1 normal and S2 normal.  No murmur, gallop or friction rub.   Chest: Symmetric chest wall expansion.   Abdomen: Abdomen is soft and non-distended.  Bowel sounds are normal.   There is no abdominal tenderness.   There is no mass. There is no splenomegaly. There is no hepatomegaly.   Extremities: There is no deformity or dependent edema.    Neurological: Patient is alert and oriented to person, place and time.  (No clear focal motor deficits).    Pupils:  Pupils are equal, round, and reactive to light.    Skin:  Warm and dry.                 Interval: baseline  1a. Level Of Consciousness: 0-->Alert: keenly responsive  1b. LOC Questions: 0-->Answers both questions correctly  1c. LOC Commands: 0-->Performs both tasks correctly  2. Best Gaze: 0-->Normal  3. Visual: 1-->Partial hemianopia  4. Facial Palsy: 1-->Minor paralysis (flattened nasolabial fold, asymmetry on smiling)  5a. Motor Arm, Left: 0-->No drift: limb holds 90 (or 45) degrees for full 10 secs  5b. Motor Arm, Right: 0-->No drift: limb holds 90 (or " 45) degrees for full 10 secs  6a. Motor Leg, Left: 0-->No drift: leg holds 30 degree position for full 5 secs  6b. Motor Leg, Right: 0-->No drift: leg holds 30 degree position for full 5 secs  7. Limb Ataxia: 0-->Absent  8. Sensory: 1-->Mild-to-moderate sensory loss: patient feels pinprick is less sharp or is dull on the affected side: or there is a loss of superficial pain with pinprick, but patient is aware of being touched  9. Best Language: 0-->No aphasia: normal  10. Dysarthria: 1-->Mild-to-moderate dysarthria: patient slurs at least some words and, at worst, can be understood with some difficulty  11. Extinction and Inattention (formerly Neglect): 0-->No abnormality    Total (NIH Stroke Scale): 4    Labs:    Results from last 7 days  Lab Units 12/17/17  1417   WBC 10*3/mm3 8.07   HEMOGLOBIN g/dL 13.9   HEMATOCRIT % 41.3   PLATELETS 10*3/mm3 183       Results from last 7 days  Lab Units 12/17/17  1417 12/17/17  1416   CREATININE mg/dL  --  1.00   ALT (SGPT) U/L 30  --    AST (SGOT) U/L 22  --      No results found for: MG, PHOS    Imaging:  CT Head revealed normal unenhanced CT scan of head    CTA Head was normal    CTA Neck was normal    CT Cerebral Perfusion Scan was normal    ECHO: 8/2017 Left ventricular systolic function is normal. Estimated EF appears to be in the range of 61 - 65%. Left ventricular wall thickness is consistent with mild concentric hypertrophy. Left ventricular diastolic dysfunction (grade II) consistent with pseudonormalization. Normal right ventricular cavity size, wall thickness, systolic function and septal motion noted. There is a moderate (1-2cm) circumferential pericardial effusion. The the effusion is fibrinous. There is no evidence of cardiac tamponade.    Assessment:  Hospital Problem List     * (Principal)CVA (cerebral vascular accident)    Left-sided weakness    Coronary artery disease involving native coronary artery of native heart with unstable angina pectoris    Overview  Signed 8/15/2017  3:38 PM by LARRY Isaac     1. [facility] 8-11-17:  · Severe three-vessel coronary artery disease  · Preserved global and regional left ventricular systolic function  · Elevated left ventricular filling pressures consistent with diastolic heart failure.  2. CABG x 3 (8-15-17):         Hypercholesterolemia    Overview Signed 11/2/2016  9:34 AM by Shannon Torres      Assessed By: Yasmin Millard (Cardiology); Last Assessed: 13 Aug 2015         GERD (gastroesophageal reflux disease)    Diabetes mellitus (Chronic)    Essential hypertension    History of CVA (cerebrovascular accident)        Leticia Eisenberg, ANTONIO, AGACNP-BC, FNP-BC  Pulmonary & Critical Care Medicine    57-year-old male with apparent acute CVA with left facial droop, left-sided weakness, and expressive difficulties.  Now status post TPA with minimal residual defects.  Imaging studies essentially normal including CT cerebral perfusion scan    Plan:     1. ICU admission  2. Post CVA TPA pathway  3. Blood sugar control.  He is on high doses of insulin at home.  4. Neurology will continue to follow  5. MRI planned    I have seen and examined patient, performing a face-to-face diagnostic evaluation with plan of care reviewed and developed with APRN and nursing staff. I have addended and modified the above history of present illness, physical examination, and assessment and plan to reflect my findings and impressions.    Peter Perez MD  Pulmonary and Critical Care Medicine                                               Electronically signed by Peter Perez MD at 12/17/2017  7:11 PM        Emergency Department Notes     No notes of this type exist for this encounter.        Vital Signs (last 24 hours)       12/17 0700  -  12/18 0659 12/18 0700  -  12/18 0951   Most Recent    Temp (°F) 97.9 -  99.3       97.9 (36.6)    Heart Rate 69 -  88       87    Resp 16 -  18       18    /76 -  144/84       121/75    SpO2  "(%) 92 -  99       92          Hospital Medications (all)       Dose Frequency Start End    acetaminophen (TYLENOL) suppository 650 mg 650 mg Every 4 Hours PRN 12/17/2017     Sig - Route: Insert 1 suppository into the rectum Every 4 (Four) Hours As Needed for Mild Pain  or Fever (Temperature greater than or equal to 37 C). - Rectal    Linked Group 1:  \"Or\" Linked Group Details        acetaminophen (TYLENOL) tablet 650 mg 650 mg Every 4 Hours PRN 12/17/2017     Sig - Route: Take 2 tablets by mouth Every 4 (Four) Hours As Needed for Mild Pain  or Fever (Temperature greater than or equal to 37 C). - Oral    Linked Group 1:  \"Or\" Linked Group Details        alteplase (ACTIVASE) 100 mg kit 0.698 mg/kg × 116 kg Once 12/17/2017 12/17/2017    Sig - Route: Infuse 80.97 mL into a venous catheter 1 (One) Time. - Intravenous    Cosign for Ordering: Accepted by Ernesto Chance MD on 12/18/2017  6:02 AM    alteplase (ACTIVASE) bolus from vial 0.0776 mg/kg × 116 kg Once 12/17/2017 12/17/2017    Sig - Route: Infuse 9 mL into a venous catheter 1 (One) Time. - Intravenous    Cosign for Ordering: Accepted by Ernesto Chance MD on 12/18/2017  6:02 AM    aspirin suppository 300 mg 300 mg Daily 12/18/2017     Sig - Route: Insert 1 suppository into the rectum Daily. - Rectal    Linked Group 2:  \"Or\" Linked Group Details        aspirin tablet 325 mg 325 mg Daily 12/18/2017     Sig - Route: Take 1 tablet by mouth Daily. - Oral    Linked Group 2:  \"Or\" Linked Group Details        atorvastatin (LIPITOR) tablet 80 mg 80 mg Nightly 12/17/2017     Sig - Route: Take 2 tablets by mouth Every Night. - Oral    dextrose (D50W) solution 25 g 25 g Every 15 Minutes PRN 12/17/2017     Sig - Route: Infuse 50 mL into a venous catheter Every 15 (Fifteen) Minutes As Needed for Low Blood Sugar (Blood Sugar Less Than 70, Patient Has IV Access - Unresponsive, NPO or Unable To Safely Swallow). - Intravenous    dextrose (GLUTOSE) oral gel 15 g 15 g Every 15 " Minutes PRN 12/17/2017     Sig - Route: Take 15 g by mouth Every 15 (Fifteen) Minutes As Needed for Low Blood Sugar (Blood Sugar Less Than 70, Patient Alert, Is Not NPO & Can Safely Swallow). - Oral    glucagon (human recombinant) (GLUCAGEN DIAGNOSTIC) injection 1 mg 1 mg Every 15 Minutes PRN 12/17/2017     Sig - Route: Inject 1 mg under the skin Every 15 (Fifteen) Minutes As Needed (Blood Glucose Less Than 70 - Patient Without IV Access - Unresponsive, NPO or Unable To Safely Swallow). - Subcutaneous    insulin detemir (LEVEMIR) injection 40 Units 40 Units Nightly 12/17/2017     Sig - Route: Inject 40 Units under the skin Every Night. - Subcutaneous    insulin lispro (humaLOG) injection 0-24 Units 0-24 Units 4 Times Daily With Meals & Nightly 12/17/2017     Sig - Route: Inject 0-24 Units under the skin 4 (Four) Times a Day With Meals & at Bedtime. - Subcutaneous    iopamidol (ISOVUE-370) 76 % injection 115 mL 115 mL Once in Imaging 12/17/2017 12/17/2017    Sig - Route: Infuse 115 mL into a venous catheter Once. - Intravenous    niCARdipine (CARDENE-IV) 20 mg/200 mL (0.1 mg/mL) in 0.9% NaCl infusion 5-15 mg/hr Titrated 12/17/2017     Sig - Route: Infuse 5-15 mg/hr into a venous catheter Dose Adjusted By Provider As Needed. - Intravenous    ondansetron (ZOFRAN) injection 4 mg 4 mg Every 6 Hours PRN 12/17/2017     Sig - Route: Infuse 2 mL into a venous catheter Every 6 (Six) Hours As Needed for Nausea or Vomiting. - Intravenous    sodium chloride 0.9 % flush 1-10 mL 1-10 mL As Needed 12/17/2017     Sig - Route: Infuse 1-10 mL into a venous catheter As Needed for Line Care. - Intravenous    sodium chloride 0.9 % flush 10 mL 10 mL As Needed 12/17/2017     Sig - Route: Infuse 10 mL into a venous catheter As Needed for Line Care. - Intravenous    Cosign for Ordering: Accepted by Ernesto Chance MD on 12/18/2017  6:02 AM    sodium chloride 0.9 % infusion 100 mL/hr Once 12/17/2017 12/17/2017    Sig - Route: Infuse 100 mL/hr  into a venous catheter 1 (One) Time. - Intravenous    Cosign for Ordering: Accepted by Ernesto Chance MD on 12/18/2017  6:02 AM            Lab Results (last 24 hours)     Procedure Component Value Units Date/Time    POC Protime / INR [[id number]]  (Normal) Collected:  12/17/17 1415    Specimen:  Blood Updated:  12/17/17 1426     Protime 13.1 seconds      INR 1.1      Serial Number: [id number]:  [id number]       CBC & Differential [[id number]] Collected:  12/17/17 1417    Specimen:  Blood Updated:  12/17/17 1428    Narrative:       The following orders were created for panel order CBC & Differential.  Procedure                               Abnormality         Status                     ---------                               -----------         ------                     CBC Auto Differential[[id number]]        Abnormal            Final result                 Please view results for these tests on the individual orders.    CBC Auto Differential [[id number]]  (Abnormal) Collected:  12/17/17 1417    Specimen:  Blood Updated:  12/17/17 1428     WBC 8.07 10*3/mm3      RBC 5.45 10*6/mm3      Hemoglobin 13.9 g/dL      Hematocrit 41.3 %      MCV 75.8 (L) fL      MCH 25.5 (L) pg      MCHC 33.7 g/dL      RDW 14.7 (H) %      RDW-SD 40.8 fl      MPV 10.5 fL      Platelets 183 10*3/mm3      Neutrophil % 64.0 %      Lymphocyte % 26.0 %      Monocyte % 5.0 %      Eosinophil % 3.6 (H) %      Basophil % 0.5 %      Immature Grans % 0.9 (H) %      Neutrophils, Absolute 5.17 10*3/mm3      Lymphocytes, Absolute 2.10 10*3/mm3      Monocytes, Absolute 0.40 10*3/mm3      Eosinophils, Absolute 0.29 10*3/mm3      Basophils, Absolute 0.04 10*3/mm3      Immature Grans, Absolute 0.07 (H) 10*3/mm3     POC Troponin, Rapid [[id number]]  (Normal) Collected:  12/17/17 1423    Specimen:  Blood Updated:  12/17/17 1440     Troponin I 0.00 ng/mL       Serial Number: [id number]:  [id number]       AST [[id number]]  (Normal) Collected:  12/17/17 1417     Specimen:  Blood Updated:  12/17/17 1443     AST (SGOT) 22 U/L     ALT [[id number]]  (Normal) Collected:  12/17/17 1417    Specimen:  Blood Updated:  12/17/17 1443     ALT (SGPT) 30 U/L     aPTT [[id number]]  (Normal) Collected:  12/17/17 1417    Specimen:  Blood Updated:  12/17/17 1445     PTT 25.4 seconds     Narrative:       PTT = The equivalent PTT values for the therapeutic range of heparin levels at 0.3 to 0.5 U/ml are 45 to 60 seconds.    Light Blue Top [[id number]] Collected:  12/17/17 1417    Specimen:  Blood Updated:  12/17/17 1531     Extra Tube hold for add-on      Auto resulted       Green Top (Gel) [[id number]] Collected:  12/17/17 1417    Specimen:  Blood Updated:  12/17/17 1531     Extra Tube Hold for add-ons.      Auto resulted.       Lavender Top [[id number]] Collected:  12/17/17 1417    Specimen:  Blood Updated:  12/17/17 1531     Extra Tube hold for add-on      Auto resulted       Gold Top - SST [[id number]] Collected:  12/17/17 1417    Specimen:  Blood Updated:  12/17/17 1531     Extra Tube Hold for add-ons.      Auto resulted.       POC CHEM 8 [[id number]]  (Abnormal) Collected:  12/17/17 1416    Specimen:  Blood Updated:  12/17/17 1652     Glucose 364 (H) mg/dL      BUN, Arterial 18 mg/dL      Creatinine 1.00 mg/dL      Sodium 138 mmol/L      Potassium 4.0 mmol/L      Chloride 97 (L) mmol/L      Total CO2 25 mmol/L      Hemoglobin 14.3 g/dL       Serial Number: [id number]:  [id number]        Hematocrit 42 %      Ionized Calcium 1.28 mmol/L     POC Glucose Once [[id number]]  (Abnormal) Collected:  12/17/17 1855    Specimen:  Blood Updated:  12/17/17 1855     Glucose 282 (H) mg/dL     Narrative:       Meter: [id number] : [id number] Mayi Durán    POC Glucose Once [[id number]]  (Abnormal) Collected:  12/17/17 2034    Specimen:  Blood Updated:  12/17/17 2036     Glucose 283 (H) mg/dL     Narrative:       Treated Patient Meter: [id number] : [id number] Greg Lam    CBC (No Diff)  [[id number]]  (Abnormal) Collected:  12/18/17 0359    Specimen:  Blood Updated:  12/18/17 0456     WBC 7.33 10*3/mm3      RBC 5.28 10*6/mm3      Hemoglobin 13.1 g/dL      Hematocrit 39.8 %      MCV 75.4 (L) fL      MCH 24.8 (L) pg      MCHC 32.9 g/dL      RDW 14.8 (H) %      RDW-SD 40.8 fl      MPV 10.9 fL      Platelets 171 10*3/mm3     Hemoglobin A1c [[id number]]  (Abnormal) Collected:  12/18/17 0359    Specimen:  Blood Updated:  12/18/17 0513     Hemoglobin A1C 10.20 (H) %     Narrative:       The American Diabetes Association recommends maintenance of Hemoglobin A1C at 7.0% or lower. Goals for Hemoglobin A1C reduction may need to be modified if hypoglycemia is a problem.    Comprehensive Metabolic Panel [[id number]]  (Abnormal) Collected:  12/18/17 0359    Specimen:  Blood Updated:  12/18/17 0515     Glucose 214 (H) mg/dL      BUN 13 mg/dL      Creatinine 0.80 mg/dL      Sodium 137 mmol/L      Potassium 3.8 mmol/L      Chloride 105 mmol/L      CO2 24.0 mmol/L      Calcium 8.8 mg/dL      Total Protein 6.0 g/dL      Albumin 3.70 g/dL      ALT (SGPT) 25 U/L      AST (SGOT) 16 U/L      Alkaline Phosphatase 83 U/L      Total Bilirubin 0.5 mg/dL      eGFR Non African Amer 100 mL/min/1.73      Globulin 2.3 gm/dL      A/G Ratio 1.6 g/dL      BUN/Creatinine Ratio 16.3     Anion Gap 8.0 mmol/L     Narrative:       National Kid[employer] Foundation Guidelines    Stage     Description        GFR  1         Normal or High     90+  2         Mild decrease      60-89  3         Moderate decrease  30-59  4         Severe decrease    15-29  5         Kidney failure     <15    Lipid Panel [[id number]]  (Abnormal) Collected:  12/18/17 0359    Specimen:  Blood Updated:  12/18/17 0515     Total Cholesterol 112 mg/dL      Triglycerides 127 mg/dL      HDL Cholesterol 28 (L) mg/dL      LDL Cholesterol  75 mg/dL     Narrative:       Cholesterol Reference Ranges:   Desirable       < 200 mg/dL   Borderline    200-239 mg/dL   High Risk       > 239  mg/dL    Triglyceride Reference Ranges:   Normal          < 150 mg/dL   Borderline    150-199 mg/dL   High          200-499 mg/dL   Very High       > 499 mg/dL    HDL Reference Ranges:   Low              < 40 mg/dL   High             > 59 mg/dL    LDL Reference Ranges:   Optimal         < 100 mg/dL   Near Optimal  100-129 mg/dL   Borderline    130-159 mg/dL   High          160-189 mg/dL   Very High       > 189 mg/dL    [facility] Draw [[id number]] Collected:  12/17/17 1417    Specimen:  Blood Updated:  12/18/17 0619    Narrative:       The following orders were created for panel order [facility] Draw.  Procedure                               Abnormality         Status                     ---------                               -----------         ------                     Light Blue Top[[id number]]                                   Final result               Green Top (Gel)[[id number]]                                  Final result               Lavender Top[[id number]]                                     Final result               Gold Top - SST[[id number]]                                   Final result               Green Top (No Gel)[[id number]]                                                            Please view results for these tests on the individual orders.    POC Glucose Once [[id number]]  (Abnormal) Collected:  12/18/17 0701    Specimen:  Blood Updated:  12/18/17 0710     Glucose 213 (H) mg/dL     Narrative:       Meter: [id number] : [id number] Troy Vidales        Imaging Results (last 24 hours)     Procedure Component Value Units Date/Time    CT Head Without Contrast [[id number]] Collected:  12/17/17 1424     Updated:  12/17/17 1428    Narrative:       EXAMINATION: CT HEAD WO CONTRAST - 12/17/2017     INDICATION: Code 19.      TECHNIQUE: CT scan of the head was performed at 5 mm intervals. No  intravenous contrast was utilized.     The radiation dose reduction device was turned on for each scan per the  IGOR  (As Low as Reasonably Achievable) protocol.     COMPARISON: MR of the brain dated 4/11/2017 (no acute findings).     FINDINGS: There is no intracranial mass. There is no hemorrhage. There  is no midline shift or extra-axial fluid collection.       Impression:       Normal unenhanced CT scan of the head.     TIME OF  EXAM:  1353 HOURS  REPORT TO ER:  1408 HOURS     DICTATED:     12/17/2017  EDITED:          12/17/2017        This report was finalized on 12/17/2017 2:26 PM by Dr. South Gonzalez MD.       CT Cerebral Perfusion With & Without Contrast [[id number]] Collected:  12/17/17 1603     Updated:  12/17/17 1607    Narrative:       EXAMINATION: CT CEREBRAL PERFUSION W WO CONTRAST - 12/17/2017     INDICATION: Stroke protocol.     TECHNIQUE: CT perfusion imaging was performed with data acquisition  regarding blood volume, blood flow, mean transit time and T-1/2.     The radiation dose reduction device was turned on for each scan per the  ALARA (As Low as Reasonably Achievable) protocol.     COMPARISON: None.     FINDINGS: The datasets indicate no significant blood flow abnormality or  asymmetry. Similarly, cerebral blood volume is symmetrical and uniform  with no mismatched defect.       Impression:       Normal CT perfusion imaging.     DICTATED:     12/17/2017  EDITED:          12/17/2017        This report was finalized on 12/17/2017 4:05 PM by Dr. South Gonzalez MD.       CT Angiogram Neck With & Without Contrast [[id number]] Collected:  12/17/17 1602     Updated:  12/17/17 1608    Narrative:       EXAMINATION: CT ANGIOGRAM NECK W WO CONTRAST - 12/17/2017      INDICATION: Stroke protocol.     TECHNIQUE: CT angiogram of the neck was performed with images acquired  prior to and following intravenous contrast. The images are displayed in  the coronal, oblique and lateral projections (3D).     The radiation dose reduction device was turned on for each scan per the  ALARA (As Low as Reasonably Achievable) protocol.      COMPARISON: None.     FINDINGS: The right common carotid artery is normal. There is a dense  calcified plaque at the origin of the right internal carotid artery.  There are similar findings on the left foot with densely calcified  plaque at the left carotid bifurcation. Both vertebral arteries are  patent.       Impression:       There are calcifications in both carotid bifurcations  rendering precise measurements difficult. However, there does not appear  to be high-grade stenosis on the right or left side involving the  internal carotid arteries. The vertebrobasilar complex is normal.     DICTATED:     12/17/2017  EDITED:          12/17/2017        This report was finalized on 12/17/2017 4:05 PM by Dr. South Gonzalez MD.       CT Angiogram Head With & Without Contrast [[id number]] Collected:  12/17/17 1559     Updated:  12/17/17 1608    Narrative:       EXAMINATION: CT ANGIOGRAM HEAD W WO CONTRAST - 12/17/2017     INDICATION: Stroke protocol.     TECHNIQUE: Intracranial CTA was performed with images acquired in the  AP, lateral and axial projections.     The radiation dose reduction device was turned on for each scan per the  ALARA (As Low as Reasonably Achievable) protocol.     COMPARISON: None.     FINDINGS: The A1 segments of each carotid artery are normal. The  anterior cerebral arteries and their primary and secondary branches are  normal. Both middle cerebral arteries are patent with no high-grade  stenosis or occlusion. The vertebral basilar complex and posterior  cerebral circulation are normal. There is no intracranial aneurysm.       Impression:       Normal intracranial CTA.     DICTATED:     12/17/2017  EDITED:          12/17/2017                 This report was finalized on 12/17/2017 4:05 PM by Dr. South Gonzalez MD.       XR Chest 1 View [[id number]] Collected:  12/17/17 1655     Updated:  12/17/17 1709    Narrative:          EXAMINATION: XR CHEST 1 VW - 12/17/2017     INDICATION: R53.1-Weakness;  R20.2-Paresthesia of skin. Stroke protocol.     COMPARISON: 10/28/2017      FINDINGS: There is a normal cardiac silhouette. There is no acute  inflammatory process, mass or effusion.       Impression:       No active disease.     DICTATED:     12/17/2017  EDITED:          12/17/2017           This report was finalized on 12/17/2017 5:07 PM by Dr. South Gonzalez MD.           Physician Progress Notes (last 24 hours) (Notes from 12/17/2017  9:51 AM through 12/18/2017  9:51 AM)     No notes of this type exist for this encounter.           Consult Notes (last 24 hours) (Notes from 12/17/2017  9:51 AM through 12/18/2017  9:51 AM)      Leticia Pena MD at 12/17/2017  2:55 PM  Version 1 of 1     Consult Orders:    1. Consult Interventional Neurologist and/or Stroke Team [[id number]] ordered by Ernesto Chance MD at 12/17/17 1413                    Referring Provider: Dr Chance    Reason for Consultation: left side weakness    Patient Care Team:  Ottoniel Toledo MD as PCP - General (Family Medicine)    Chief complaint left side weakness and slurred speech    Subjective .     History of present illness:  56 yo RH man with multiple medical problems including coronary artery disease status post stenting and more recently CABG in August, diabetes, hypertension, hyperlipidemia and reported stroke about 3 years ago, was in his usual health today when he went to [employer], but while sitting noted his head felt funny around 11 and he noted after that difficulty with speech root was slurred and he had some difficulty getting words out.  He could comprehend others.  He then noted his left arm and leg were somewhat weak and he felt his left face was drooping.  He noted no changes in vision and no headache. His family reported they noticed Mr. Harding spacing out during [employer] earlier today but Mr. Harding refused to go to the hospital. However, during lunch they noticed that Mr. Harding had some facial droop, unequal , and  slurred speech so they brought him into the ED.  In the ED, his NIH SS score was 4, but significant left leg weakness was noted and decision was made to pursue tPA as he had no contraindications.  At the time of my exam he had received bolus and 30 minutes of infusion and reports left side does feel stronger although not back to baseline.  He cannot remember what symptoms he had from stroke 3 years ago except that was weakness on one side and that he recovered.  He presented with generalized weakness and speech difficulty in April 2016, at which time MRI was negative for stroke.  He is followed by Dr. Shoemaker for migraine and diabetic neuropathy.  Cannot recall if he has ever had symptoms like this before, but apparently not recently at least.  No chest pain or palpitations.    Review of Systems  Pertinent items are noted in HPI, all other systems reviewed and negative    History  Past Medical History:   Diagnosis Date   • Anxiety    • Arthritis    • Asthma    • BiPAP (biphasic positive airway pressure) dependence    • Brachial neuritis 1/19/2017   • Chest pain    • COPD (chronic obstructive pulmonary disease)    • Depression    • Diabetes mellitus    • Dizziness    • Edema    • GERD (gastroesophageal reflux disease)    • H/O chest x-ray 07/04/2015    Mild Left base atelectasis   • H/O echocardiogram 07/05/2015    Normal LVSF. EF of 60-65%. Grade 1 diastolic dysfunction of the LV myocardium. No evidence of pericardial effusion   • H/O exercise stress test    • History of herniated intervertebral disc     History of left L5-S1 disc herniation   • LOM (loss of memory) 1/19/2017   • Lower back pain     Right   • Measles    • Obstructive sleep apnea    • Palpitations    • Pericardial effusion    • Seizure disorder    • Shortness of breath 1/19/2017   • SOB (shortness of breath)    • Stroke    • Wears glasses    ,   Past Surgical History:   Procedure Laterality Date   • BACK SURGERY     • CARDIAC CATHETERIZATION     •  CARDIAC CATHETERIZATION N/A 8/11/2017    Procedure: Coronary angiography;  Surgeon: Dallas Hernandez MD;  Location: [facility] CATH INVASIVE LOCATION;  Service:    • CARDIAC CATHETERIZATION N/A 8/11/2017    Procedure: Left Heart Cath;  Surgeon: Dallas Hernandez MD;  Location: [facility] CATH INVASIVE LOCATION;  Service:    • CARDIAC CATHETERIZATION N/A 8/11/2017    Procedure: Left ventriculography;  Surgeon: Dallas Hernandez MD;  Location: [facility] CATH INVASIVE LOCATION;  Service:    • CARDIOVASCULAR STRESS TEST  07/03/2017    WITH DR HERNANDEZ AT [facility]   • CARPAL TUNNEL RELEASE Right    • CATARACT EXTRACTION W/ INTRAOCULAR LENS IMPLANT Right 6/12/2017    Procedure: CATARACT PHACO EXTRACTION WITH INTRAOCULAR LENS IMPLANT RIGHT ;  Surgeon: Natalie Cao MD;  Location: [facility] OR;  Service:    • CATARACT EXTRACTION W/ INTRAOCULAR LENS IMPLANT Left 7/10/2017    Procedure: CATARACT PHACO EXTRACTION WITH INTRAOCULAR LENS IMPLANT LEFT;  Surgeon: Natalie Cao MD;  Location: [facility] OR;  Service:    • CHOLECYSTECTOMY     • COLONOSCOPY     • CORONARY ANGIOPLASTY WITH STENT PLACEMENT     • CORONARY ANGIOPLASTY WITH STENT PLACEMENT      X1-LAD   • CORONARY ARTERY BYPASS GRAFT N/A 8/15/2017    Procedure: CORONARY ARTERY BYPASS GRAFTING x 3 UTILIZING THE LEFT INTERNAL MAMMARY ARTERY WITH ENDOSCOPIC VEIN HARVESTING OF THE RIGHT GREATER SAPHENOUS VEIN, HAMLET, LAD ENDARECTOMY;  Surgeon: London Damon MD;  Location: [facility] beds at the [facility] OR;  Service:    • ENDOSCOPY     • EYE SURGERY      cataract surgery both eyes   • KNEE ARTHROSCOPY Bilateral    • NEUROPLASTY / TRANSPOSITION ULNAR NERVE AT ELBOW     • OTHER SURGICAL HISTORY      Foraminotomy and discectomy   • PERICARDIAL WINDOW N/A 8/25/2017    Procedure: PERICARDIAL WINDOW;  Surgeon: Freeman Phillips MD;  Location:  [facility] OR;  Service:    ,   Family History   Problem Relation Age of Onset   • Hypertension Mother    • Arthritis Mother    • Alcohol abuse Father    • Heart disease Other    • Stroke Other   "  • Hypertension Other    • Other Other      Neurologic disorder   • Parkinsonism Other    • Stroke Other    • Heart disease Other    • Hypertension Other    • Heart disease Other    • Stroke Other    • Hypertension Other    ,   Social History   Substance Use Topics   • Smoking status: Former Smoker     Packs/day: 1.00     Years: 10.00     Types: Cigarettes     Quit date: 1/1/1998   • Smokeless tobacco: Former User     Types: Snuff     Quit date: 5/24/2017   • Alcohol use Yes      Comment: 3 PER YEAR   ,   (Not in a hospital admission), Scheduled Meds:    alteplase 0.698 mg/kg Intravenous Once   sodium chloride 100 mL/hr Intravenous Once   , Continuous Infusions:   , PRN Meds:  sodium chloride and Allergies:  Sulfa antibiotics; Codeine; Invokana [canagliflozin]; and Morphine    Objective     Vital Signs   Blood pressure 138/86, pulse 77, temperature 99.3 °F (37.4 °C), temperature source Oral, resp. rate 18, height 177.8 cm (70\"), weight 116 kg (255 lb), SpO2 98 %.    Physical Exam:   Middle-aged well-developed, well-nourished white man in no acute distress lying in the ED currently, with normal language, attention, memory and fund of knowledge at this time.  He has no dysarthria.  Pupils 2.5 mm and reactive, disks poorly visualized, visual fields full to confrontation, extraocular movements intact, no facial asymmetry with grimace initially then diminished grimace on the left which recovers and he can puff his cheeks.  He reports light touch is less on the left face (and arm and leg), hearing syntactically voice, palate and shoulders elevate symmetrically and tongue protrudes midline  Motor: Left upper extremity is 5 out of 5 no drift, left lower extremity 4/5 on manual muscle testing but heel-knee-shin is normal bilaterally, as is finger-nose-finger  Sensory he has diminished vibration to the knees bilaterally and diminished light touch in left upper and lower extremities above  Tone is normal, reflexes 1-2+ and " symmetric in the upper extremities, trace to absent at the knees and not obtained at the ankles, no Babinski or pathological reflex  Gait is not tested  Neck supple, no carotid bruit appreciated  Heart RRR no murmur appreciated  Abdomen soft, obese, NT, ND with positive bowel sounds    Results Review:   I reviewed the patient's new clinical results.  I reviewed the patient's new imaging results and agree with the interpretation.  I reviewed the patient's other test results and agree with the interpretation  Head CT personally reviewed, agree unremarkable  CBC shows normal white count H&H and platelets, PTT normal, AST and ALT normal, rest pending    Assessment/Plan       Left side weakness affecting left lower extremity primarily as well as transient slurred speech concerning for stroke in patient with risk factors for stroke including coronary artery disease, previous stroke, diabetes, hypertension and hyperlipidemia -agree with tPA given left leg weakness on presentation would result in significant debility.  He is apparently improving midway through infusion.  Risks of the treatment discussed with patient, as well as plan for ICU monitoring and testing.  Currently on aspirin and Lipitor 40 mg, we'll plan on high-dose statin.    I discussed the patients findings and my recommendations with patient, family and consulting provider    Leticia Pena MD  17  2:56 PM             Electronically signed by Leticia Pena MD at 2017  3:10 PM      ANTONIO Uribe at 2017  3:51 PM  Version 1 of 1         NAME: MIKY HASSAN  : 1960  PCP: Ottoniel Toledo MD  Attending MD: Peter Perez, *    Date of Admission:  2017  Date of Service: 2017     CC:  Weakness and slurred speech    History of Present Illness:  57 y.o.  male male who presents to the ED with c/o left sided weakness. He reports that around 1100 today he was sitting in [employer] when he began to feel  "\"wonky in the head\" before developing left sided weakness. He currently also complains of left leg numbness, and some slowed and slurred speech. His family reports they noticed Mr. Harding spacing out during [employer] earlier today but Mr. Harding refused to go to the hospital. However, during lunch they noticed that Mr. Harding had some facial droop, unequal , and slurred speech so they brought him into the ED. They state that these sx are similar to the sx he had with his stroke 3 years ago. He has a hx of a triple bypass in August of this year. No other acute complaints at this time.  He has received alteplase.         Review of Systems   Neurological: Positive for facial asymmetry, speech difficulty (Slowed and slurred speech), weakness (Left sided weakness) and numbness.   All other systems reviewed and are negative.    Past Medical History:  Past Medical History:   Diagnosis Date   • Anxiety    • Arthritis    • Asthma    • BiPAP (biphasic positive airway pressure) dependence    • Brachial neuritis 1/19/2017   • Chest pain    • COPD (chronic obstructive pulmonary disease)    • Depression    • Diabetes mellitus    • Dizziness    • Edema    • GERD (gastroesophageal reflux disease)    • H/O chest x-ray 07/04/2015    Mild Left base atelectasis   • H/O echocardiogram 07/05/2015    Normal LVSF. EF of 60-65%. Grade 1 diastolic dysfunction of the LV myocardium. No evidence of pericardial effusion   • H/O exercise stress test    • History of herniated intervertebral disc     History of left L5-S1 disc herniation   • LOM (loss of memory) 1/19/2017   • Lower back pain     Right   • Measles    • Obstructive sleep apnea    • Palpitations    • Pericardial effusion    • Seizure disorder    • Shortness of breath 1/19/2017   • SOB (shortness of breath)    • Stroke    • Wears glasses        Past Surgical History:  Past Surgical History:   Procedure Laterality Date   • BACK SURGERY     • CARDIAC CATHETERIZATION     • CARDIAC " CATHETERIZATION N/A 8/11/2017    Procedure: Coronary angiography;  Surgeon: Dallas Hernandez MD;  Location: [facility] CATH INVASIVE LOCATION;  Service:    • CARDIAC CATHETERIZATION N/A 8/11/2017    Procedure: Left Heart Cath;  Surgeon: Dallas Hernandez MD;  Location: [facility] CATH INVASIVE LOCATION;  Service:    • CARDIAC CATHETERIZATION N/A 8/11/2017    Procedure: Left ventriculography;  Surgeon: Dallas Hernandez MD;  Location: [facility] CATH INVASIVE LOCATION;  Service:    • CARDIOVASCULAR STRESS TEST  07/03/2017    WITH DR HERNANDEZ AT [facility]   • CARPAL TUNNEL RELEASE Right    • CATARACT EXTRACTION W/ INTRAOCULAR LENS IMPLANT Right 6/12/2017    Procedure: CATARACT PHACO EXTRACTION WITH INTRAOCULAR LENS IMPLANT RIGHT ;  Surgeon: Natalie Cao MD;  Location: [facility] OR;  Service:    • CATARACT EXTRACTION W/ INTRAOCULAR LENS IMPLANT Left 7/10/2017    Procedure: CATARACT PHACO EXTRACTION WITH INTRAOCULAR LENS IMPLANT LEFT;  Surgeon: Natalie Cao MD;  Location: [facility] OR;  Service:    • CHOLECYSTECTOMY     • COLONOSCOPY     • CORONARY ANGIOPLASTY WITH STENT PLACEMENT     • CORONARY ANGIOPLASTY WITH STENT PLACEMENT      X1-LAD   • CORONARY ARTERY BYPASS GRAFT N/A 8/15/2017    Procedure: CORONARY ARTERY BYPASS GRAFTING x 3 UTILIZING THE LEFT INTERNAL MAMMARY ARTERY WITH ENDOSCOPIC VEIN HARVESTING OF THE RIGHT GREATER SAPHENOUS VEIN, HAMLET, LAD ENDARECTOMY;  Surgeon: London Damon MD;  Location: [facility] OR;  Service:    • ENDOSCOPY     • EYE SURGERY      cataract surgery both eyes   • KNEE ARTHROSCOPY Bilateral    • NEUROPLASTY / TRANSPOSITION ULNAR NERVE AT ELBOW     • OTHER SURGICAL HISTORY      Foraminotomy and discectomy   • PERICARDIAL WINDOW N/A 8/25/2017    Procedure: PERICARDIAL WINDOW;  Surgeon: Freeman Phillips MD;  Location:  [facility] OR;  Service:          Medications    (Not in a hospital admission)    Allergies:  Allergies   Allergen Reactions   • Sulfa Antibiotics Anaphylaxis, Nausea And Vomiting and Delirium   • Codeine  Nausea And Vomiting     Can only take with Phenergan   • Invokana [Canagliflozin]      DKA   • Morphine      Does not work. It causes pain       Social Hx:  Social History     Social History   • Marital status:      Spouse name: N/A   • Number of children: 1   • Years of education: N/A     Occupational History   • [employer]      Disabled since 2002-Back Problems     Social History Main Topics   • Smoking status: Former Smoker     Packs/day: 1.00     Years: 10.00     Types: Cigarettes     Quit date: 1/1/1998   • Smokeless tobacco: Former User     Types: Snuff     Quit date: 5/24/2017   • Alcohol use Yes      Comment: 3 PER YEAR   • Drug use: No   • Sexual activity: Defer     Other Topics Concern   • Not on file     Social History Narrative    Caffeine use: 0 serving daily.    Patient lives at home with wife.            Family Hx:  Family History   Problem Relation Age of Onset   • Hypertension Mother    • Arthritis Mother    • Alcohol abuse Father    • Heart disease Other    • Stroke Other    • Hypertension Other    • Other Other      Neurologic disorder   • Parkinsonism Other    • Stroke Other    • Heart disease Other    • Hypertension Other    • Heart disease Other    • Stroke Other    • Hypertension Other        Review of Imaging:  Ct Head Without Contrast    Result Date: 12/17/2017  EXAMINATION: CT HEAD WO CONTRAST - 12/17/2017  INDICATION: Code 19.  TECHNIQUE: CT scan of the head was performed at 5 mm intervals. No intravenous contrast was utilized.  The radiation dose reduction device was turned on for each scan per the ALARA (As Low as Reasonably Achievable) protocol.  COMPARISON: MR of the brain dated 4/11/2017 (no acute findings).  FINDINGS: There is no intracranial mass. There is no hemorrhage. There is no midline shift or extra-axial fluid collection.      Normal unenhanced CT scan of the head.  TIME OF  EXAM:  1353 HOURS REPORT TO ER:  1408 HOURS  DICTATED:     12/17/2017  EDITED:          12/17/2017   This report was finalized on 12/17/2017 2:26 PM by Dr. South Gonzalez MD.      CT of Perfusion:  Negative for LVO      Laboratory Result:  Lab Results   Component Value Date    WBC 8.07 12/17/2017    HGB 13.9 12/17/2017    HCT 41.3 12/17/2017    MCV 75.8 (L) 12/17/2017     12/17/2017     Lab Results   Component Value Date    GLUCOSE 189 (H) 10/28/2017    CALCIUM 9.9 10/28/2017     10/28/2017    K 3.8 10/28/2017    CO2 24.0 10/28/2017     10/28/2017    BUN 15 10/28/2017    CREATININE 0.80 10/28/2017    EGFRIFNONA 100 10/28/2017    BCR 18.8 10/28/2017    ANIONGAP 12.0 (H) 10/28/2017     Lab Results   Component Value Date    HGBA1C 8.90 (H) 08/11/2017     Lab Results   Component Value Date    CHOL 122 08/12/2017     Lab Results   Component Value Date    TRIG 165 (H) 08/12/2017     Lab Results   Component Value Date    HDL 29 (L) 08/12/2017     No results found for: LDLCALC  No results found for: LDL  No results found for: HDLLDLRATIO  No components found for: CHOLHDL    Physical Examination:  Vitals:    12/17/17 1543   BP: 125/83   Pulse: 75   Resp:    Temp:    SpO2: 99%        General Appearance:   Well developed, well nourished, well groomed, alert, and cooperative.  Cardiovascular: Regular rate and rhythm. No carotid bruits    Neurological examination:  Patient has a slight left facial droop noted, speech is slightly slurred but understandable.   He has no drift noted upon examination with strength 5/5 in all extremities.  He does state a diminished sensation on the left.          Diagnoses/Plan:    Mr. Harding is a 57 y.o. male who met criteria for alteplase administration and therefore he has received this treatment.  All the risks and benefits were discussed with he and his family and they are in agreement.  He will need the stroke order set and an MRI.  He was not a candidate for neurointervention as his CT Perfusion of the head was negative for LVO.  We will  continue to monitor and follow.                   Electronically signed by ANTONIO Uribe at 12/17/2017  4:00 PM

## 2017-12-18 NOTE — PLAN OF CARE
Problem: Stroke (Ischemic) (Adult)  Intervention: Monitor/Assist with Self Care    12/17/17 2000 12/18/17 1600   Monitor/Assist with Self Care   Ambulation 2-->assistive person --    Transferring 2-->assistive person --    Toileting 2-->assistive person --    Bathing 0-->independent --    Dressing 0-->independent --    Eating 0-->independent --    Communication 0-->understands/communicates without difficulty --    Swallowing (if score 2 or more for any item, consult Rehab Services) 0-->swallows foods/liquids without difficulty --    Activity   Activity Type --  activity adjusted per tolerance   Activity Level of Assistance --  independent       Intervention: Provide Oxygenation/Ventilation/Perfusion Support    12/17/17 2000 12/18/17 1600   Activity   Activity Type --  activity adjusted per tolerance   Safety Interventions   Medication Review/Management medications reviewed --    Positioning   Head Of Bed (HOB) Position --  HOB at 60 degrees       Intervention: Support Psychosocial Response to Stroke    12/18/17 0800 12/18/17 1600   Coping Strategies   Family/Support System Care --  involvement promoted;presence promoted   Supportive Measures --  active listening utilized;decision-making supported;positive reinforcement provided;problem solving facilitated   Coping/Psychosocial Interventions   Environmental Support calm environment promoted;environmental consistency promoted --        Intervention: Manage Hypertension/Promote Hemodynamic Stability    12/17/17 2000 12/18/17 1600   Safety Interventions   Medication Review/Management medications reviewed --    Safety Interventions   Bleeding Precautions --  blood pressure closely monitored       Intervention: Protect/Optimize Cerebral Perfusion    12/18/17 1400 12/18/17 1600 12/18/17 1746   Cognitive Interventions   Sensory Stimulation Regulation --  --  care clustered   Positioning   Head Of Bed (HOB) Position --  HOB at 60 degrees --    Neurological Interventions    Cerebral Edema Prevention/Management blood pressure monitored --  --        Intervention: Protect Affected Joints/Extremities    12/18/17 1600   Positioning   Positioning/Transfer Devices pillows;in use   Positioning high Fowlers       Intervention: Prevent/Minimize Sheer/Friction Injuries    12/18/17 0600 12/18/17 1600   Positioning   Positioning/Transfer Devices --  pillows;in use   Skin Interventions   Pressure Reduction Techniques frequent weight shift encouraged --    Pressure Reduction Devices specialty bed utilized --        Intervention: Prevent/Manage DVT/VTE Risk    12/18/17 0800   Support Surgical/Anesthesia Recovery   Venous Thromboembolism Prevent/Manage bilateral;sequential compression devices on       Intervention: Promote Effective Communication    12/17/17 1700   Cognitive Interventions   Communication Enhancement Strategies call light answered in person       Intervention: Promote/Optimize Sensory/Motor Ability    12/18/17 1746   Cognitive Interventions   Sensory Stimulation Regulation care clustered         Goal: Signs and Symptoms of Listed Potential Problems Will be Absent or Manageable (Stroke)  Outcome: Ongoing (interventions implemented as appropriate)    12/18/17 1746   Stroke (Ischemic)   Problems Assessed (Stroke (Ischemic)/TIA) all   Problems Present (Stroke (Ischemic)/TIA) motor/sensory impairment         Problem: Skin Integrity Impairment, Risk/Actual (Adult)  Intervention: Prevent/Manage Excess Moisture    12/17/17 2300 12/18/17 0600 12/18/17 1300   Hygiene Care Assistance   Perineal Care cleansed --  --    Hygiene Care   Bathing/Skin Care --  --  patient refused  (Wanted to wait to take shower if possible)   Skin Interventions   Skin Protection --  incontinence pads utilized --        Intervention: Prevent/Minimize Sheer/Friction Injuries    12/18/17 0600 12/18/17 1600   Positioning   Positioning/Transfer Devices --  pillows;in use   Skin Interventions   Pressure Reduction  Techniques frequent weight shift encouraged --    Pressure Reduction Devices specialty bed utilized --          Goal: Identify Related Risk Factors and Signs and Symptoms  Outcome: Ongoing (interventions implemented as appropriate)  Goal: Skin Integrity/Wound Healing  Outcome: Ongoing (interventions implemented as appropriate)    12/18/17 5029   Skin Integrity Impairment, Risk/Actual (Adult)   Skin Integrity/Wound Healing making progress toward outcome

## 2017-12-18 NOTE — PROGRESS NOTES
"Neurology       Patient Care Team:  Ottoniel Toledo MD as PCP - General (Family Medicine)    Chief complaint left leg weakness      Subjective .     History:  Doesn't feel quite back to normal. No new vision changes. Mild headache. No new w/n. Shocked by his elevated A1C, walked through hallways.    ROS: no fever, cp, sob    Objective     Vital Signs   Blood pressure 147/84, pulse 84, temperature 98.2 °F (36.8 °C), temperature source Oral, resp. rate 16, height 177.8 cm (70\"), weight 115 kg (254 lb 4.8 oz), SpO2 97 %.    Physical Exam:              Neuro: wd wm in nad , alert, fluent  Vff, eomi, face symm, TML  Motor 5/5 on MMT    Results Review:              a1C 10.2  Echo and carotids pending    Assessment/Plan     Transient leg weakness, speech difficulty, s/p tPA. Clinically improved. Will get MRI. On aspirin and high dose statin. Discussed DM control and risk for stroke.    I discussed the patients findings and my recommendations with patient    Leticia Pena MD  12/18/17  12:18 PM    "

## 2017-12-18 NOTE — PROGRESS NOTES
Adult Nutrition  Assessment/PES    Patient Name:  Denzel Harding  YOB: 1960  MRN: [id number]  Admit Date:  12/17/2017    Assessment Date:  12/18/2017    Comments:            Reason for Assessment       12/18/17 1657    Reason for Assessment    Reason For Assessment/Visit multidisciplinary rounds;admission assessment    Identified At Risk By Screening Criteria MST SCORE 2+;unintentional loss of 10 lbs or more in the past 2 mos    Time Spent (min) 30    Diagnosis Diagnosis    Cardiac HTN;Dyslipidemia;CAD    Endocrine DM Type 2    Neurological CVA;Seizure disorder   hx of CVA w/ L weakness              Nutrition/Diet History       12/18/17 1701    Nutrition/Diet History    Reported/Observed By RN;Patient    Other Pt doing great s/p tPA all symptoms resolved.  Pt reports intentional weight loss but does not know how much he has lost.                    Nutrition Prescription Ordered       12/18/17 1702    Nutrition Prescription PO    Current PO Diet Regular    Fluid Consistency Thin    Common Modifiers Cardiac;Consistent Carbohydrate              Problem/Interventions:        Problem 1       12/18/17 1702    Nutrition Diagnoses Problem 1    Problem 1 Limited Adherence to Diet Modifications    Etiology (related to) MNT for Treatment/Condition    Signs/Symptoms (evidenced by) Potential Information Deficit;Biochemical    Specific Labs Noted HgbA1C;Glucose                    Intervention Goal       12/18/17 1703    Intervention Goal    General Meet nutritional needs for age/condition    PO Tolerate PO;Establish PO            Nutrition Intervention       12/18/17 1703    Nutrition Intervention    RD/Tech Action Advise alternate selection;Menu provided;Follow Tx progress;Care plan reviewd              Education/Evaluation       12/18/17 1703    Education    Education --   Diabetes education consulted    Monitor/Evaluation    Monitor Per protocol;I&O;PO intake;Pertinent labs;Weight        Electronically signed  by:  Barbie Thorpe RD  12/18/17 5:04 PM

## 2017-12-19 ENCOUNTER — APPOINTMENT (OUTPATIENT)
Dept: GENERAL RADIOLOGY | Facility: HOSPITAL | Age: 57
End: 2017-12-19

## 2017-12-19 LAB
GLUCOSE BLDC GLUCOMTR-MCNC: 191 MG/DL (ref 70–130)
GLUCOSE BLDC GLUCOMTR-MCNC: 285 MG/DL (ref 70–130)
GLUCOSE BLDC GLUCOMTR-MCNC: 287 MG/DL (ref 70–130)
GLUCOSE BLDC GLUCOMTR-MCNC: 327 MG/DL (ref 70–130)

## 2017-12-19 PROCEDURE — 71010 HC CHEST PA OR AP: CPT

## 2017-12-19 PROCEDURE — 82962 GLUCOSE BLOOD TEST: CPT

## 2017-12-19 PROCEDURE — 92523 SPEECH SOUND LANG COMPREHEN: CPT

## 2017-12-19 PROCEDURE — G0108 DIAB MANAGE TRN  PER INDIV: HCPCS | Performed by: REGISTERED NURSE

## 2017-12-19 PROCEDURE — 94760 N-INVAS EAR/PLS OXIMETRY 1: CPT

## 2017-12-19 PROCEDURE — 94799 UNLISTED PULMONARY SVC/PX: CPT

## 2017-12-19 PROCEDURE — 97110 THERAPEUTIC EXERCISES: CPT

## 2017-12-19 PROCEDURE — 97116 GAIT TRAINING THERAPY: CPT

## 2017-12-19 PROCEDURE — 87070 CULTURE OTHR SPECIMN AEROBIC: CPT | Performed by: INTERNAL MEDICINE

## 2017-12-19 PROCEDURE — 99233 SBSQ HOSP IP/OBS HIGH 50: CPT | Performed by: INTERNAL MEDICINE

## 2017-12-19 PROCEDURE — 87205 SMEAR GRAM STAIN: CPT | Performed by: INTERNAL MEDICINE

## 2017-12-19 PROCEDURE — 63710000001 INSULIN DETEMIR PER 5 UNITS: Performed by: INTERNAL MEDICINE

## 2017-12-19 PROCEDURE — 99232 SBSQ HOSP IP/OBS MODERATE 35: CPT | Performed by: PSYCHIATRY & NEUROLOGY

## 2017-12-19 PROCEDURE — 94640 AIRWAY INHALATION TREATMENT: CPT

## 2017-12-19 RX ORDER — CEFUROXIME AXETIL 250 MG/1
500 TABLET ORAL EVERY 12 HOURS SCHEDULED
Status: DISCONTINUED | OUTPATIENT
Start: 2017-12-19 | End: 2017-12-22 | Stop reason: HOSPADM

## 2017-12-19 RX ORDER — GUAIFENESIN AND DEXTROMETHORPHAN HYDROBROMIDE 600; 30 MG/1; MG/1
2 TABLET, EXTENDED RELEASE ORAL 2 TIMES DAILY
Status: DISCONTINUED | OUTPATIENT
Start: 2017-12-19 | End: 2017-12-22 | Stop reason: HOSPADM

## 2017-12-19 RX ORDER — IPRATROPIUM BROMIDE AND ALBUTEROL SULFATE 2.5; .5 MG/3ML; MG/3ML
3 SOLUTION RESPIRATORY (INHALATION)
Status: DISCONTINUED | OUTPATIENT
Start: 2017-12-19 | End: 2017-12-21

## 2017-12-19 RX ADMIN — INSULIN DETEMIR 30 UNITS: 100 INJECTION, SOLUTION SUBCUTANEOUS at 21:01

## 2017-12-19 RX ADMIN — ASPIRIN 325 MG ORAL TABLET 325 MG: 325 PILL ORAL at 09:03

## 2017-12-19 RX ADMIN — RANOLAZINE 500 MG: 500 TABLET, FILM COATED, EXTENDED RELEASE ORAL at 21:00

## 2017-12-19 RX ADMIN — INSULIN LISPRO 12 UNITS: 100 INJECTION, SOLUTION INTRAVENOUS; SUBCUTANEOUS at 21:00

## 2017-12-19 RX ADMIN — AMLODIPINE BESYLATE 5 MG: 5 TABLET ORAL at 09:02

## 2017-12-19 RX ADMIN — INSULIN LISPRO 12 UNITS: 100 INJECTION, SOLUTION INTRAVENOUS; SUBCUTANEOUS at 18:24

## 2017-12-19 RX ADMIN — METOPROLOL TARTRATE 50 MG: 50 TABLET ORAL at 09:03

## 2017-12-19 RX ADMIN — IPRATROPIUM BROMIDE AND ALBUTEROL SULFATE 3 ML: .5; 3 SOLUTION RESPIRATORY (INHALATION) at 19:15

## 2017-12-19 RX ADMIN — TOPIRAMATE 100 MG: 25 TABLET, FILM COATED ORAL at 09:02

## 2017-12-19 RX ADMIN — CEFUROXIME AXETIL 500 MG: 250 TABLET ORAL at 21:00

## 2017-12-19 RX ADMIN — ATORVASTATIN CALCIUM 80 MG: 40 TABLET, FILM COATED ORAL at 21:00

## 2017-12-19 RX ADMIN — GUAIFENESIN 200 MG: 200 SOLUTION ORAL at 03:29

## 2017-12-19 RX ADMIN — GUAIFENESIN AND DEXTROMETHORPHAN HYDROBROMIDE 2 TABLET: 600; 30 TABLET, EXTENDED RELEASE ORAL at 21:00

## 2017-12-19 RX ADMIN — MONTELUKAST SODIUM 10 MG: 10 TABLET, FILM COATED ORAL at 21:00

## 2017-12-19 RX ADMIN — GABAPENTIN 300 MG: 400 CAPSULE ORAL at 18:24

## 2017-12-19 RX ADMIN — IPRATROPIUM BROMIDE AND ALBUTEROL SULFATE 3 ML: .5; 3 SOLUTION RESPIRATORY (INHALATION) at 03:46

## 2017-12-19 RX ADMIN — METOPROLOL TARTRATE 50 MG: 50 TABLET ORAL at 21:00

## 2017-12-19 RX ADMIN — LISINOPRIL 40 MG: 40 TABLET ORAL at 09:02

## 2017-12-19 RX ADMIN — IPRATROPIUM BROMIDE AND ALBUTEROL SULFATE 3 ML: .5; 3 SOLUTION RESPIRATORY (INHALATION) at 12:59

## 2017-12-19 RX ADMIN — GUAIFENESIN AND DEXTROMETHORPHAN HYDROBROMIDE 2 TABLET: 600; 30 TABLET, EXTENDED RELEASE ORAL at 11:39

## 2017-12-19 RX ADMIN — INSULIN LISPRO 4 UNITS: 100 INJECTION, SOLUTION INTRAVENOUS; SUBCUTANEOUS at 09:01

## 2017-12-19 RX ADMIN — GABAPENTIN 300 MG: 400 CAPSULE ORAL at 09:02

## 2017-12-19 RX ADMIN — INSULIN LISPRO 16 UNITS: 100 INJECTION, SOLUTION INTRAVENOUS; SUBCUTANEOUS at 12:58

## 2017-12-19 RX ADMIN — INSULIN DETEMIR 30 UNITS: 100 INJECTION, SOLUTION SUBCUTANEOUS at 11:40

## 2017-12-19 RX ADMIN — RANOLAZINE 500 MG: 500 TABLET, FILM COATED, EXTENDED RELEASE ORAL at 09:03

## 2017-12-19 RX ADMIN — CEFUROXIME AXETIL 500 MG: 250 TABLET ORAL at 11:40

## 2017-12-19 NOTE — PLAN OF CARE
Problem: Stroke (Ischemic) (Adult)  Goal: Signs and Symptoms of Listed Potential Problems Will be Absent or Manageable (Stroke)  Outcome: Ongoing (interventions implemented as appropriate)    12/18/17 2322   Stroke (Ischemic)   Problems Assessed (Stroke (Ischemic)/TIA) all   Problems Present (Stroke (Ischemic)/TIA) motor/sensory impairment         Problem: Skin Integrity Impairment, Risk/Actual (Adult)  Goal: Identify Related Risk Factors and Signs and Symptoms  Outcome: Ongoing (interventions implemented as appropriate)    12/18/17 2322   Skin Integrity Impairment, Risk/Actual   Skin Integrity Impairment, Risk/Actual: Related Risk Factors sensory impairment       Goal: Skin Integrity/Wound Healing  Outcome: Ongoing (interventions implemented as appropriate)    12/18/17 2322   Skin Integrity Impairment, Risk/Actual (Adult)   Skin Integrity/Wound Healing making progress toward outcome

## 2017-12-19 NOTE — PROGRESS NOTES
"Intensive Care Follow-up      LOS: 2 days     Mr. Denzel Harding, 57 y.o. male is followed for: CVA (cerebral vascular accident)     Subjective - Interval History     Complaining of cough and yellow sputum production.  No hemoptysis.  Has had some offing after oral intake \"if I'm not careful\"  Blood pressure under good control.  Blood sugars remain elevated  Neurologically stable from yesterday    The patient's relevant past medical, surgical and social history were reviewed and updated in Epic as appropriate.     Objective     Infusions:    niCARdipine 5-15 mg/hr     Medications:    amLODIPine 5 mg Oral Daily   aspirin 325 mg Oral Daily   Or      aspirin 300 mg Rectal Daily   atorvastatin 80 mg Oral Nightly   cefuroxime 500 mg Oral Q12H   gabapentin 300 mg Oral BID   guaifenesin-dextromethorphan 2 tablet Oral BID   insulin detemir 30 Units Subcutaneous Q12H   insulin lispro 0-24 Units Subcutaneous 4x Daily With Meals & Nightly   lisinopril 40 mg Oral Daily   metoprolol tartrate 50 mg Oral Q12H   montelukast 10 mg Oral Nightly   ranolazine 500 mg Oral Q12H   topiramate 100 mg Oral Daily   Vortioxetine HBr 10 mg Oral Daily     Intake/Output       12/18/17 0700 - 12/19/17 0659    Intake (ml) 1820    Output (ml) 2525    Net (ml) -705        Vital Sign Min/Max for last 24 hours  Temp  Min: 98.3 °F (36.8 °C)  Max: 100.5 °F (38.1 °C)   BP  Min: 91/51  Max: 171/83   Pulse  Min: 66  Max: 101   Resp  Min: 16  Max: 18   SpO2  Min: 82 %  Max: 99 %   No Data Recorded        Physical Exam:   GENERAL: Awake, no distress   HEENT: No adenopathy or thyromegaly   LUNGS: A few rhonchi bilaterally that clear with cough, no wheezes   HEART: Regular rate and rhythm without murmurs   ABDOMEN: Obese, soft, nontender   EXTREMITIES: Palpable pulses.  Trace edema.  No cyanosis   NEURO/PSYCH: Awake and alert.  Follows commands.  No deficits overtly      Results from last 7 days  Lab Units 12/18/17  0359 12/17/17  1417 12/17/17  1416   WBC " 10*3/mm3 7.33 8.07  --    HEMOGLOBIN g/dL 13.1 13.9  --    HEMOGLOBIN, POC g/dL  --   --  14.3   PLATELETS 10*3/mm3 171 183  --        Results from last 7 days  Lab Units 12/18/17  0359 12/17/17  1416   SODIUM mmol/L 137  --    POTASSIUM mmol/L 3.8  --    CO2 mmol/L 24.0  --    BUN mg/dL 13  --    CREATININE mg/dL 0.80 1.00   GLUCOSE mg/dL 214*  --      Estimated Creatinine Clearance: 131 mL/min (by C-G formula based on Cr of 0.8).      Results from last 7 days  Lab Units 12/18/17  0359   HEMOGLOBIN A1C % 10.20*           Lab Results   Component Value Date    LACTATE 1.7 11/27/2015          Images: Initial chest x-ray clear of consolidation or effusions    I reviewed the patient's results and images.     Impression      Hospital Problem List     * (Principal)CVA (S/P tPA)    Coronary artery disease involving native coronary artery of native heart with unstable angina pectoris    Overview Signed 8/15/2017  3:38 PM by LARRY Isaac     1. [facility] 8-11-17:  · Severe three-vessel coronary artery disease  · Preserved global and regional left ventricular systolic function  · Elevated left ventricular filling pressures consistent with diastolic heart failure.  2. CABG x 3 (8-15-17):         Hypercholesterolemia    Overview Signed 11/2/2016  9:34 AM by Shannon Torres      Assessed By: Yasmin Millard (Cardiology); Last Assessed: 13 Aug 2015         GERD (gastroesophageal reflux disease)    Diabetes mellitus    Essential hypertension    History of CVA (cerebrovascular accident)    Left-sided weakness               Plan        Sputum culture  Chest x-ray  Empiric antibiotic for acute bronchitis  Dysphagia reassessment  Increase insulin coverage  Consider transfer to the floor if he stabilizes from a pulmonary perspective  Probably will not require inpatient rehabilitation services once ready for discharge      Plan of care and goals reviewed with mulitdisciplinary team at daily rounds   I discussed the patient's findings and  my recommendations with patient and nursing staff       BERE Hicks MD  Pulmonary and Critical Care Medicine

## 2017-12-19 NOTE — THERAPY EVALUATION
Acute Care - Speech Language Pathology Initial Evaluation  [facility]   Cognitive-Communication Evaluation     Patient Name: Denzel Harding  : 1960  MRN: [id number]  Today's Date: 2017  Onset of Illness/Injury or Date of Surgery Date: 17            Admit Date: 2017     Visit Dx:    ICD-10-CM ICD-9-CM   1. Left-sided weakness R53.1 728.87   2. Paresthesia of left arm and leg R20.2 782.0   3. Impaired functional mobility, balance, gait, and endurance Z74.09 V49.89   4. Impaired mobility and ADLs Z74.09 799.89     Patient Active Problem List   Diagnosis   • Coronary artery disease involving native coronary artery of native heart with unstable angina pectoris   • Lumbar radiculopathy   • Hypercholesterolemia   • Diabetic polyneuropathy associated with type 2 diabetes mellitus   • Migraine with aura and with status migrainosus   • Palpitations   • Seizure disorder   • GERD (gastroesophageal reflux disease)   • Brachial neuritis   • Cervical radiculopathy   • LOM (loss of memory)   • Anxiety   • Diabetes mellitus   • Lower back pain   • Essential hypertension   • History of CVA (cerebrovascular accident)   • Non morbid obesity due to excess calories   • Post-infarction pericarditis   • HCAP (healthcare-associated pneumonia)   • CVA (S/P tPA)   • Left-sided weakness     Past Medical History:   Diagnosis Date   • Anxiety    • Arthritis    • Asthma    • BiPAP (biphasic positive airway pressure) dependence    • Brachial neuritis 2017   • Chest pain    • COPD (chronic obstructive pulmonary disease)    • Depression    • Diabetes mellitus    • Dizziness    • Edema    • GERD (gastroesophageal reflux disease)    • H/O chest x-ray 2015    Mild Left base atelectasis   • H/O echocardiogram 2015    Normal LVSF. EF of 60-65%. Grade 1 diastolic dysfunction of the LV myocardium. No evidence of pericardial effusion   • H/O exercise stress test    • History of herniated intervertebral disc      History of left L5-S1 disc herniation   • LOM (loss of memory) 1/19/2017   • Lower back pain     Right   • Measles    • Obstructive sleep apnea    • Palpitations    • Pericardial effusion    • Seizure disorder    • Shortness of breath 1/19/2017   • SOB (shortness of breath)    • Stroke    • Wears glasses      Past Surgical History:   Procedure Laterality Date   • BACK SURGERY     • CARDIAC CATHETERIZATION     • CARDIAC CATHETERIZATION N/A 8/11/2017    Procedure: Coronary angiography;  Surgeon: Dallas Kim MD;  Location: [facility] CATH INVASIVE LOCATION;  Service:    • CARDIAC CATHETERIZATION N/A 8/11/2017    Procedure: Left Heart Cath;  Surgeon: Dallas Kim MD;  Location: [facility] CATH INVASIVE LOCATION;  Service:    • CARDIAC CATHETERIZATION N/A 8/11/2017    Procedure: Left ventriculography;  Surgeon: Dallas Kim MD;  Location: [facility] CATH INVASIVE LOCATION;  Service:    • CARDIOVASCULAR STRESS TEST  07/03/2017    WITH DR KIM AT [facility]   • CARPAL TUNNEL RELEASE Right    • CATARACT EXTRACTION W/ INTRAOCULAR LENS IMPLANT Right 6/12/2017    Procedure: CATARACT PHACO EXTRACTION WITH INTRAOCULAR LENS IMPLANT RIGHT ;  Surgeon: Natalie Cao MD;  Location: [facility] OR;  Service:    • CATARACT EXTRACTION W/ INTRAOCULAR LENS IMPLANT Left 7/10/2017    Procedure: CATARACT PHACO EXTRACTION WITH INTRAOCULAR LENS IMPLANT LEFT;  Surgeon: Natalie Cao MD;  Location: [facility] OR;  Service:    • CHOLECYSTECTOMY     • COLONOSCOPY     • CORONARY ANGIOPLASTY WITH STENT PLACEMENT     • CORONARY ANGIOPLASTY WITH STENT PLACEMENT      X1-LAD   • CORONARY ARTERY BYPASS GRAFT N/A 8/15/2017    Procedure: CORONARY ARTERY BYPASS GRAFTING x 3 UTILIZING THE LEFT INTERNAL MAMMARY ARTERY WITH ENDOSCOPIC VEIN HARVESTING OF THE RIGHT GREATER SAPHENOUS VEIN, HAMLET, LAD ENDARECTOMY;  Surgeon: London Damon MD;  Location: [facility] OR;  Service:    • ENDOSCOPY     • EYE SURGERY      cataract surgery both eyes   • KNEE ARTHROSCOPY Bilateral    •  NEUROPLASTY / TRANSPOSITION ULNAR NERVE AT ELBOW     • OTHER SURGICAL HISTORY      Foraminotomy and discectomy   • PERICARDIAL WINDOW N/A 8/25/2017    Procedure: PERICARDIAL WINDOW;  Surgeon: Freeman Phillips MD;  Location: [facility];  Service:           SLP EVALUATION (last 72 hours)      SLP Evaluation       12/19/17 9553                Rehab Evaluation    Document Type evaluation  -SM        Subjective Information agree to therapy  -SM        General Information    Patient Profile Review yes  -SM        Subjective Patient Observations Alert and cooperative  -SM        Pertinent History Of Current Problem L yaz, s/p TPA. MRI negative  -SM        Current Diet Limitations regular solid;thin liquids  -SM        Precautions/Limitations, Vision corrective lenses needed for reading  -SM        Precautions/Limitations, Hearing WFL  -SM        Prior Level of Function- Communication functional in all spheres  -SM        Prior Level of Function- Swallowing safe, efficient swallowing in all situations  -SM        Plans/Goals Discussed With patient;agreed upon  -        Barriers to Rehab none identified  -        Clinical Impression    Patient's Goals For Discharge --   improve speech  -SM        Criteria for Skilled Therapeutic Interventions Met skilled criteria for speech language intervention met;other (see comments)   will provide home-exercise program  -SM        Therapy Frequency PRN;5 times/wk  -SM        Pain Assessment    Pain Assessment No/denies pain  -SM        Cognitive Assessment/Intervention    Current Cognitive/Communication Assessment functional  -SM        Orientation Status oriented x 4  -        Follows Commands/Answers Questions 100% of the time  -        Short/Long Term Memory intact short term memory;intact long term memory  -        Additional Documentation Cognitive Assessment Intervention (Group)  -        Cognitive Assessment Intervention    Behavior/Mood Observations behavior appropriate  to situation, WNL/WFL  -SM        Attention WNL/WFL  -SM        Pragmatics WNL/WFL  -SM        Problem Solving WNL/WFL  -SM        Executive Function Skills WNL/WFL  -SM        Reasoning WNL/WFL  -SM        Sequencing WNL/WFL  -SM        Organization WNL/WFL  -SM        Cognitive Assessment/Treatment Comment Scored 29/30 on MOCA  -SM        Communication Assessment/Intervention    Additional Documentation Auditory Comprehen Assess/Intervention (Group);Reading Assessment/Intervention (Group);Verbal Expression Assess/Intervention (Group);Writing Assessment/Intervention (Group);Motor Speech Assessment/Intervention (Group)  -SM        Auditory Comprehen Assess/Intervention    Auditory Comprehension WNL/WFL  -SM        Verbal Expression Assess/Intervention    Conversational Speech WNL/WFL  -SM        Reading Assessment/Intervention    Reading Skills WNL/WFL  -SM        Writing Assessment/Intervention    Writing Skills WNL/WFL  -SM        Motor Speech Assess/Intervention    Motor Speech- Apraxia WNL/WFL  -SM        Motor Speech- Dysarthria WNL/WFL  -SM        Communication Treatment Objective and Progress    Dysarthria Treatment Objectives Improve articulation  -SM        Improve articulation    Improve articulation: by over-articulating at word level;by over-articulating at phrase level;100%;without cues  -SM        Status: Improve articulation New  -SM        Articulation Progress continue to address  -SM          User Key  (r) = Recorded By, (t) = Taken By, (c) = Cosigned By    Initials Name Effective Dates     Mary Alvarez MS CCC-SLP 06/22/15 -            EDUCATION  The patient has been educated in the following areas:   Cognitive Impairment Communication Impairment.    SLP Recommendation and Plan           Criteria for Skilled Therapeutic Interventions Met: skilled criteria for speech language intervention met, other (see comments) (will provide home-exercise program)        Therapy Frequency: PRN, 5  "times/wk          Plan of Care Review  Plan Of Care Reviewed With: patient  Outcome Summary/Follow up Plan: Cog-Comm Eval: All skills WFL except min articulation errors, pt notes mostly with \"s\". Speech intelligibility WFL. Will f/u to provide speech exercises he can do independently. Anticipate it will continue to resolve. Thanks             Time Calculation:         Time Calculation- SLP       12/19/17 1421          Time Calculation- SLP    SLP Start Time 1400  -      SLP Received On 12/19/17  -        User Key  (r) = Recorded By, (t) = Taken By, (c) = Cosigned By    Initials Name Provider Type     Mary Alvarez MS CCC-SLP Speech and Language Pathologist          Therapy Charges for Today     Code Description Service Date Service Provider Modifiers Qty    [id number]  ST EVAL SPEECH AND PROD W LANG  3 12/19/2017 Mary Alvarez MS CCC-SLP GN 1                     Mary Alvarez MS CCC-SLP  12/19/2017  "

## 2017-12-19 NOTE — THERAPY DISCHARGE NOTE
Acute Care - Physical Therapy Treatment Note/Discharge  [facility]     Patient Name: Denzel Harding  : 1960  MRN: [id number]  Today's Date: 2017  Onset of Illness/Injury or Date of Surgery Date: 17  Date of Referral to PT: 17  Referring Physician: ANTONIO Eisenberg    Admit Date: 2017    Visit Dx:    ICD-10-CM ICD-9-CM   1. Left-sided weakness R53.1 728.87   2. Paresthesia of left arm and leg R20.2 782.0   3. Impaired functional mobility, balance, gait, and endurance Z74.09 V49.89   4. Impaired mobility and ADLs Z74.09 799.89     Patient Active Problem List   Diagnosis   • Coronary artery disease involving native coronary artery of native heart with unstable angina pectoris   • Lumbar radiculopathy   • Hypercholesterolemia   • Diabetic polyneuropathy associated with type 2 diabetes mellitus   • Migraine with aura and with status migrainosus   • Palpitations   • Seizure disorder   • GERD (gastroesophageal reflux disease)   • Brachial neuritis   • Cervical radiculopathy   • LOM (loss of memory)   • Anxiety   • Diabetes mellitus   • Lower back pain   • Essential hypertension   • History of CVA (cerebrovascular accident)   • Non morbid obesity due to excess calories   • Post-infarction pericarditis   • HCAP (healthcare-associated pneumonia)   • CVA (S/P tPA)   • Left-sided weakness       Physical Therapy Education     Title: PT OT SLP Therapies (Active)     Topic: Physical Therapy (Done)     Point: Mobility training (Done)    Learning Progress Summary    Learner Readiness Method Response Comment Documented by Status   Patient Acceptance KENN MOHR DU Edu pt re: home safety and benefit of further ambulation in [facility] with NSG. LS 17 0935 Done    Acceptance ONUR ALAN 17 0920 Done               Point: Home exercise program (Done)    Learning Progress Summary    Learner Readiness Method Response Comment Documented by Status   Patient Acceptance KENN MOHR DU Edu pt re: home safety and  benefit of further ambulation in hallway with NSG.  12/19/17 0935 Done    Acceptance E VU   12/18/17 0920 Done               Point: Body mechanics (Done)    Learning Progress Summary    Learner Readiness Method Response Comment Documented by Status   Patient Acceptance KENN MOHR DU Edu pt re: home safety and benefit of further ambulation in hallway with NSG.  12/19/17 0935 Done    Acceptance E VU   12/18/17 0920 Done               Point: Precautions (Done)    Learning Progress Summary    Learner Readiness Method Response Comment Documented by Status   Patient Acceptance KENN MOHR DU Edu pt re: home safety and benefit of further ambulation in hallway with NSG.  12/19/17 0935 Done    Acceptance E VU   12/18/17 0920 Done                      User Key     Initials Effective Dates Name Provider Type Discipline     06/19/15 -  Kathrin Mera, PT Physical Therapist PT     10/30/17 -  Marianna Valentine, PT Physical Therapist PT                    IP PT Goals       12/19/17 0935 12/18/17 0921       Transfer Training PT LTG    Transfer Training PT LTG, Date Established  12/18/17  -MJ     Transfer Training PT LTG, Time to Achieve  2 wks  -MJ     Transfer Training PT LTG, Activity Type  all transfers  -MJ     Transfer Training PT LTG, [place] Level  independent  -MJ     Transfer Training PT  LTG, Date Goal Reviewed 12/19/17  -LS      Transfer Training PT LTG, Outcome goal met  -LS      Gait Training PT LTG    Gait Training Goal PT LTG, Date Established  12/18/17  -MJ     Gait Training Goal PT LTG, Time to Achieve  2 wks  -MJ     Gait Training Goal PT LTG, [place] Level  independent  -MJ     Gait Training Goal PT LTG, Distance to Achieve  600'  -MJ     Gait Training Goal PT LTG, Date Goal Reviewed 12/19/17  -LS      Gait Training Goal PT LTG, Outcome goal met  -LS      Stair Training PT LTG    Stair Training Goal PT LTG, Date Established  12/18/17  -MJ     Stair Training Goal PT LTG, Time to Achieve  2 wks  -MJ      Stair Training Goal PT LTG, Number of Steps  3  -MJ     Stair Training Goal PT LTG, [place] Level  supervision required  -MJ     Stair Training Goal PT LTG, Assist Device  1 handrail  -MJ     Stair Training Goal PT LTG, Date Goal Reviewed 12/19/17  -      Stair Training Goal PT LTG, Outcome goal met   per clinical judgment; can use ramp at home  -LS        User Key  (r) = Recorded By, (t) = Taken By, (c) = Cosigned By    Initials Name Provider Type    LS Kathrin Mera, PT Physical Therapist    DAYANNA Valentine, PT Physical Therapist              Adult Rehabilitation Note       12/19/17 0828          Rehab Assessment/Intervention    Discipline physical therapist  -LS      Document Type discharge summary;therapy note (daily note)  -LS      Patient Effort, Rehab Treatment good  -LS      Precautions/Limitations fall precautions  -LS      Recorded by [LS] Kathrin Mera, PT      Vital Signs    Pre Systolic BP Rehab 118  -LS      Pre Treatment Diastolic BP 40  -LS      Post Systolic BP Rehab 104  -LS      Post Treatment Diastolic BP 69  -LS      Pretreatment Heart Rate (beats/min) 82  -LS      Posttreatment Heart Rate (beats/min) 85  -LS      Pre SpO2 (%) 93  -LS      O2 Delivery Pre Treatment supplemental O2   2L  -LS      Intra SpO2 (%) 91  -LS      O2 Delivery Intra Treatment supplemental O2  -LS      Post SpO2 (%) 91  -LS      O2 Delivery Post Treatment supplemental O2  -LS      Pre Patient Position Supine  -LS      Intra Patient Position Standing  -LS      Post Patient Position Sitting  -LS      Recorded by [LS] Kathrin Mera, PT      Pain Assessment    Pain Assessment 0-10  -LS      John-Fuentes FACES Pain Rating 0  -LS      Pain Score 0  -LS      Post Pain Score 0  -LS      Recorded by [LS] Kathrin Mera, PT      Cognitive Assessment/Intervention    Current Cognitive/Communication Assessment functional  -LS      Orientation Status oriented x 4  -LS      Follows Commands/Answers Questions able to follow  single-step instructions;100% of the time  -LS      Personal Safety good awareness, safety precautions  -LS      Personal Safety Interventions fall prevention program maintained;gait belt;nonskid shoes/slippers when out of bed  -LS      Recorded by [LS] Kathrin Mera PT      Bed Mobility, Assessment/Treatment    Bed Mob, Supine to Sit, [place] independent  -LS      Recorded by [LS] Kathrin Mera PT      Transfer Assessment/Treatment    Transfers, Sit-Stand [place] independent  -LS      Transfers, Stand-Sit [place] independent  -LS      Recorded by [LS] Kathrin Mera PT      Gait Assessment/Treatment    Gait, [place] Level independent  -LS      Gait, Distance (Feet) 700  -LS      Gait, Comment No noted LOB; c/o slight SOA by end of distance.  -LS      Recorded by [LS] Kathrin Mera PT      Motor Skills/Interventions    Additional Documentation Balance Skills Training (Group)  -LS      Recorded by [LS] Kathrin Mera PT      Balance Skills Training    Sitting-Level of Assistance Independent  -LS      Sitting-Balance Support Feet supported  -LS      Standing-Level of Assistance Independent  -LS      Static Standing Balance Support No upper extremity supported  -LS      Gait Balance-Level of Assistance Independent  -LS      Gait Balance Support No upper extremity supported  -LS      Recorded by [LS] Kathrin Mera, PT      Therapy Exercises    Bilateral Lower Extremities AROM:;15 reps;supine;ankle pumps/circles;glut sets;quad sets  -LS      Recorded by [LS] Kathrin Mera PT      Positioning and Restraints    Pre-Treatment Position in bed  -LS      Post Treatment Position chair  -LS      In Chair notified nsg;reclined;call light within reach;encouraged to call for assist;exit alarm on;legs elevated  -LS      Recorded by [LS] Kathrin Mera PT        User Key  (r) = Recorded By, (t) = Taken By, (c) = Cosigned By    Initials Name Effective Dates    TRACEY Mera PT 06/19/15 -           PT  Recommendation and Plan  Anticipated Discharge Disposition: home with assist  Planned Therapy Interventions: balance training, home exercise program, strengthening, transfer training  PT Frequency: daily  Plan of Care Review  Plan Of Care Reviewed With: patient  Progress: improving  Outcome Summary/Follow up Plan: Pt demonstrated increased indep with transfers and gait; progressed forward ambulation distance to 700 total ft with no noted LOB. [facility] pt re: home safety and benefit of further ambulation with NSG. Will d/c pt from PT services at this time as all goals achieved and pt appears to be at baseline re: functional mobility. Continue to recommend d/c home with assist.           Outcome Measures       12/19/17 0828 12/18/17 0838 12/18/17 0836    How much help from another person do you currently need...    Turning from your back to your side while in flat bed without using bedrails? 4  -LS 4  -MJ     Moving from lying on back to sitting on the side of a flat bed without bedrails? 4  -LS 4  -MJ     Moving to and from a bed to a chair (including a wheelchair)? 4  -LS 3  -MJ     Standing up from a chair using your arms (e.g., wheelchair, bedside chair)? 4  -LS 3  -MJ     Climbing 3-5 steps with a railing? 3  -LS 3  -MJ     To walk in hospital room? 4  -LS 3  -MJ     AM-PAC 6 Clicks Score 23  -LS 20  -MJ     How much help from another is currently needed...    Putting on and taking off regular lower body clothing?   3  -CL    Bathing (including washing, rinsing, and drying)   3  -CL    Toileting (which includes using toilet bed pan or urinal)   4  -CL    Putting on and taking off regular upper body clothing   3  -CL    Taking care of personal grooming (such as brushing teeth)   3  -CL    Eating meals   3  -CL    Score   19  -CL    Modified [place] Scale    Modified [place] Scale 0 - No Symptoms at all.  -LS 1 - No significant disability despite symptoms.  Able to carry out all usual duties and activities.  -MJ 1 - No  significant disability despite symptoms.  Able to carry out all usual duties and activities.  -CL    Functional Assessment    Outcome Measure Options AM-PAC 6 Clicks Basic Mobility (PT);Modified [place]  -LS AM-PAC 6 Clicks Basic Mobility (PT);Modified [place]  -MJ AM-PAC 6 Clicks Daily Activity (OT)  -CL      User Key  (r) = Recorded By, (t) = Taken By, (c) = Cosigned By    Initials Name Provider Type    TRACEY Mera, PT Physical Therapist    CL Rosita Medellin, OT Occupational Therapist    MJ Marianna Valentine, PT Physical Therapist           Time Calculation:         PT Charges       12/19/17 0941          Time Calculation    Start Time 0828  -LS      PT Received On 12/19/17  -      PT Goal Re-Cert Due Date 12/28/17  -      Time Calculation- PT    Total Timed Code Minutes- PT 23 minute(s)  -LS        User Key  (r) = Recorded By, (t) = Taken By, (c) = Cosigned By    Initials Name Provider Type    TRACEY Mera, PT Physical Therapist          Therapy Charges for Today     Code Description Service Date Service Provider Modifiers Qty    02821802564 HC PT THER PROC EA 15 MIN 12/19/2017 Kathrin Mera, PT GP 1    80598063638 HC GAIT TRAINING EA 15 MIN 12/19/2017 Kathrin Mera, PT GP 1          PT G-Codes  Outcome Measure Options: AM-PAC 6 Clicks Basic Mobility (PT), Modified [place]    PT Discharge Summary  Reason for Discharge: All goals achieved, At baseline function  Outcomes Achieved: Able to achieve all goals within established timeline    Kathrin Mera, PT  12/19/2017

## 2017-12-19 NOTE — PLAN OF CARE
Problem: Patient Care Overview (Adult)  Goal: Plan of Care Review  Outcome: Ongoing (interventions implemented as appropriate)    12/19/17 0935   Coping/Psychosocial Response Interventions   Plan Of Care Reviewed With patient   Outcome Evaluation   Outcome Summary/Follow up Plan Pt demonstrated increased indep with transfers and gait; progressed forward ambulation distance to 700 total ft with no noted LOB. Edu pt re: home safety and benefit of further ambulation with NSG. Will d/c pt from PT services at this time as all goals achieved and pt appears to be at baseline re: functional mobility. Continue to recommend d/c home with assist.    Patient Care Overview   Progress improving         Problem: Stroke (Ischemic) (Adult)  Goal: Signs and Symptoms of Listed Potential Problems Will be Absent or Manageable (Stroke)  Outcome: Ongoing (interventions implemented as appropriate)    12/19/17 0935   Stroke (Ischemic)   Problems Assessed (Stroke (Ischemic)/TIA) motor/sensory impairment;cognitive impairment;communication impairment   Problems Present (Stroke (Ischemic)/TIA) none         Problem: Inpatient Physical Therapy  Goal: Transfer Training Goal 1 LTG- PT  Outcome: Outcome(s) achieved Date Met:  12/19/17 12/18/17 0921 12/19/17 0935   Transfer Training PT LTG   Transfer Training PT LTG, Date Established 12/18/17 --    Transfer Training PT LTG, Time to Achieve 2 wks --    Transfer Training PT LTG, Activity Type all transfers --    Transfer Training PT LTG, [place] Level independent --    Transfer Training PT LTG, Date Goal Reviewed --  12/19/17   Transfer Training PT LTG, Outcome --  goal met       Goal: Gait Training Goal LTG- PT  Outcome: Outcome(s) achieved Date Met:  12/19/17 12/18/17 0921 12/19/17 0935   Gait Training PT LTG   Gait Training Goal PT LTG, Date Established 12/18/17 --    Gait Training Goal PT LTG, Time to Achieve 2 wks --    Gait Training Goal PT LTG, [place] Level independent --     Gait Training Goal PT LTG, Distance to Achieve 600' --    Gait Training Goal PT LTG, Date Goal Reviewed --  12/19/17   Gait Training Goal PT LTG, Outcome --  goal met       Goal: Stair Training Goal LTG- PT  Outcome: Outcome(s) achieved Date Met:  12/19/17 12/18/17 0921 12/19/17 0935   Stair Training PT LTG   Stair Training Goal PT LTG, Date Established 12/18/17 --    Stair Training Goal PT LTG, Time to Achieve 2 wks --    Stair Training Goal PT LTG, Number of Steps 3 --    Stair Training Goal PT LTG, [place] Level supervision required --    Stair Training Goal PT LTG, Assist Device 1 handrail --    Stair Training Goal PT LTG, Date Goal Reviewed --  12/19/17   Stair Training Goal PT LTG, Outcome --  goal met  (per clinical judgment; can use ramp at home)

## 2017-12-19 NOTE — CONSULTS
"Diabetes Education  Assessment/Teaching    Patient Name:  Denzel Harding  YOB: 1960  MRN: [id number]  Admit Date:  12/17/2017      Assessment Date:  12/19/2017       Most Recent Value    General Information      Referral From:  A1c, Blood glucose, MD order    Height  177.8 cm (70\")    Height Method  Stated    Weight  115 kg (254 lb 4.8 oz)    Weight Method  Bed scale [zeroed]    Pregnancy Assessment     Diabetes History     What type of diabetes do you have?  Type 2    Length of Diabetes Diagnosis  10 + years    Current DM knowledge  fair    Have you had diabetes education/teaching in the past?  yes    When and where was your diabetes education?  15+ years ago    Do you test your blood sugar at home?  no [States wife lost his meter & hasn't checked for about 6 weeks]    Have you had low blood sugar? (<70mg/dl)  yes    How often do you have low blood sugar?  rare    Have you had high blood sugar? (>140mg/dl)  yes    How often do you have high blood sugar?  occasionally    Education Preferences     What areas of diabetes would you like to learn about?  avoiding high blood sugar    Nutrition Information     Assessment Topics     Healthy Eating - Assessment  Needs education    Being Active - Assessment  Needs education    Reducing Risk - Assessment  Needs education    Monitoring - Assessment  Needs education    DM Goals     Contact Plan  Phone call, 0-30 days from discharge [FU with PCP]               Most Recent Value    DM Education Needs     Meter  Meter provided [States wife lost his meter]    Meter Type  One Touch    Frequency of Testing  4 times a day    Blood Glucose Target Range  80 to 130    Medication  Insulin, Pen, Actions, Side effects    Problem Solving  Hypoglycemia, Hyperglycemia, Sick days, Signs, Symptoms, Treatment    Reducing Risks  A1C testing, Lipids, Eye exam, Dental exam, Foot care, Immunizations, Cardiovascular, Blood pressure    Physical Activity  Walking    Physical Activity " Frequency  Rarely    Healthy Coping  Appropriate    Discharge Plan  Home    Motivation  Moderate    Teaching Method  Explanation, Discussion, Handouts, Teach back    Patient Response  Verbalized understanding, Needs reinforcement        Mr. Harding states he has been a Type 2 diabetic since 1998. He is currently on Toujeo and Humalog at home. He states he hasn't checked his BG levels for about 6 weeks as his wife lost his meter. He said prior to that his levels were 90 to 180 fasting. His current A1c is 10.2, it was 8.9 in August 2017, discussed this significance. Stressed need to get better control of his diabetes to prevent any further complications.  Discussed and taught patient about type 2 diabetes self-management, risk factors, and importance of blood glucose control to reduce complications. Target blood glucose readings and A1c goals per ADA were reviewed. Signs, symptoms and treatment of hyperglycemia and hypoglycemia were discussed. Lifestyle changes such as physical activity with MD approval and healthy eating were encouraged.  Pt instructed to  check blood sugar 4 times per day and to call PCP if  Blood glucose is higher than 180 two times or more.  Patient was encouraged to keep record of blood glucose readings to take to follow up appointment with PCP.  Patient states he has not attended a diabetes class since he was diagnosed. He lives in [facility] And encouraged him to attend a class at his local health department, also gave brochure to  diabetes outpatient classes if he chooses to come to [place].          Electronically signed by:  Vidhya Ware RN  12/19/17 2:10 PM

## 2017-12-19 NOTE — PLAN OF CARE
Problem: Stroke (Ischemic) (Adult)  Goal: Signs and Symptoms of Listed Potential Problems Will be Absent or Manageable (Stroke)  Outcome: Ongoing (interventions implemented as appropriate)    Problem: Skin Integrity Impairment, Risk/Actual (Adult)  Goal: Skin Integrity/Wound Healing  Outcome: Ongoing (interventions implemented as appropriate)

## 2017-12-19 NOTE — PROGRESS NOTES
"Neurology       Patient Care Team:  Ottoniel Toledo MD as PCP - General (Family Medicine)    Chief complaint left leg weakness      Subjective .     History:  Limb strength is better, but now has a cough. No headache.    ROS: temp to 100.5, cough as above. No cp    Objective     Vital Signs   Blood pressure 127/69, pulse 80, temperature 99 °F (37.2 °C), temperature source Oral, resp. rate 16, height 177.8 cm (70\"), weight 115 kg (254 lb 4.8 oz), SpO2 91 %.    Physical Exam:              Neuro: wd wm in nad, fluent, no dysarthria  eomi face symm TML  Motor 5/5  Coordination intact    Results Review:              TTE unremarkable, neg for source of clot  Carotids unremarkable  Brain MRI pers reviewed, no acute ischemia  CT f/u negative for hemorrhage    Assessment/Plan     Transient left side weakness s/p tPA -- multiple RFs for stroke, d/w pt DM and lifestyle factors. OK for dc home from neuro perspective.    I discussed the patients findings and my recommendations with patient and primary care team    Leticia Pena MD  12/19/17  12:28 PM    "

## 2017-12-19 NOTE — PLAN OF CARE
"Problem: Patient Care Overview (Adult)  Goal: Plan of Care Review  Outcome: Ongoing (interventions implemented as appropriate)    12/19/17 1419   Coping/Psychosocial Response Interventions   Plan Of Care Reviewed With patient   Outcome Evaluation   Outcome Summary/Follow up Plan Cog-Comm Eval: All skills WFL except min articulation errors, pt notes mostly with \"s\". Speech intelligibility WFL. Will f/u to provide speech exercises he can do independently. Anticipate it will continue to resolve. Thanks            "

## 2017-12-20 ENCOUNTER — APPOINTMENT (OUTPATIENT)
Dept: CARDIAC REHAB | Facility: HOSPITAL | Age: 57
End: 2017-12-20

## 2017-12-20 PROBLEM — J20.9 ACUTE BRONCHITIS: Status: ACTIVE | Noted: 2017-12-20

## 2017-12-20 LAB
FLUAV SUBTYP SPEC NAA+PROBE: NOT DETECTED
FLUBV RNA ISLT QL NAA+PROBE: NOT DETECTED
GLUCOSE BLDC GLUCOMTR-MCNC: 201 MG/DL (ref 70–130)
GLUCOSE BLDC GLUCOMTR-MCNC: 275 MG/DL (ref 70–130)
GLUCOSE BLDC GLUCOMTR-MCNC: 309 MG/DL (ref 70–130)
GLUCOSE BLDC GLUCOMTR-MCNC: 323 MG/DL (ref 70–130)
GLUCOSE BLDC GLUCOMTR-MCNC: 394 MG/DL (ref 70–130)

## 2017-12-20 PROCEDURE — 82962 GLUCOSE BLOOD TEST: CPT

## 2017-12-20 PROCEDURE — 63710000001 INSULIN DETEMIR PER 5 UNITS: Performed by: INTERNAL MEDICINE

## 2017-12-20 PROCEDURE — 94640 AIRWAY INHALATION TREATMENT: CPT

## 2017-12-20 PROCEDURE — 99231 SBSQ HOSP IP/OBS SF/LOW 25: CPT | Performed by: PSYCHIATRY & NEUROLOGY

## 2017-12-20 PROCEDURE — 87502 INFLUENZA DNA AMP PROBE: CPT | Performed by: INTERNAL MEDICINE

## 2017-12-20 PROCEDURE — 99233 SBSQ HOSP IP/OBS HIGH 50: CPT | Performed by: INTERNAL MEDICINE

## 2017-12-20 PROCEDURE — 94799 UNLISTED PULMONARY SVC/PX: CPT

## 2017-12-20 PROCEDURE — 94760 N-INVAS EAR/PLS OXIMETRY 1: CPT

## 2017-12-20 RX ADMIN — CEFUROXIME AXETIL 500 MG: 250 TABLET ORAL at 21:08

## 2017-12-20 RX ADMIN — INSULIN LISPRO 16 UNITS: 100 INJECTION, SOLUTION INTRAVENOUS; SUBCUTANEOUS at 21:07

## 2017-12-20 RX ADMIN — GUAIFENESIN AND DEXTROMETHORPHAN HYDROBROMIDE 2 TABLET: 600; 30 TABLET, EXTENDED RELEASE ORAL at 21:07

## 2017-12-20 RX ADMIN — INSULIN LISPRO 16 UNITS: 100 INJECTION, SOLUTION INTRAVENOUS; SUBCUTANEOUS at 17:38

## 2017-12-20 RX ADMIN — ATORVASTATIN CALCIUM 80 MG: 40 TABLET, FILM COATED ORAL at 21:07

## 2017-12-20 RX ADMIN — RANOLAZINE 500 MG: 500 TABLET, FILM COATED, EXTENDED RELEASE ORAL at 08:15

## 2017-12-20 RX ADMIN — LISINOPRIL 40 MG: 40 TABLET ORAL at 08:15

## 2017-12-20 RX ADMIN — GABAPENTIN 300 MG: 400 CAPSULE ORAL at 08:15

## 2017-12-20 RX ADMIN — IPRATROPIUM BROMIDE AND ALBUTEROL SULFATE 3 ML: .5; 3 SOLUTION RESPIRATORY (INHALATION) at 08:09

## 2017-12-20 RX ADMIN — INSULIN LISPRO 8 UNITS: 100 INJECTION, SOLUTION INTRAVENOUS; SUBCUTANEOUS at 08:15

## 2017-12-20 RX ADMIN — ASPIRIN 325 MG ORAL TABLET 325 MG: 325 PILL ORAL at 08:15

## 2017-12-20 RX ADMIN — TOPIRAMATE 100 MG: 25 TABLET, FILM COATED ORAL at 08:15

## 2017-12-20 RX ADMIN — GABAPENTIN 300 MG: 400 CAPSULE ORAL at 17:39

## 2017-12-20 RX ADMIN — MONTELUKAST SODIUM 10 MG: 10 TABLET, FILM COATED ORAL at 21:08

## 2017-12-20 RX ADMIN — IPRATROPIUM BROMIDE AND ALBUTEROL SULFATE 3 ML: .5; 3 SOLUTION RESPIRATORY (INHALATION) at 13:22

## 2017-12-20 RX ADMIN — RANOLAZINE 500 MG: 500 TABLET, FILM COATED, EXTENDED RELEASE ORAL at 21:08

## 2017-12-20 RX ADMIN — INSULIN DETEMIR 30 UNITS: 100 INJECTION, SOLUTION SUBCUTANEOUS at 08:16

## 2017-12-20 RX ADMIN — CEFUROXIME AXETIL 500 MG: 250 TABLET ORAL at 08:14

## 2017-12-20 RX ADMIN — METOPROLOL TARTRATE 50 MG: 50 TABLET ORAL at 08:15

## 2017-12-20 RX ADMIN — INSULIN LISPRO 12 UNITS: 100 INJECTION, SOLUTION INTRAVENOUS; SUBCUTANEOUS at 12:11

## 2017-12-20 RX ADMIN — AMLODIPINE BESYLATE 5 MG: 5 TABLET ORAL at 08:15

## 2017-12-20 RX ADMIN — INSULIN DETEMIR 30 UNITS: 100 INJECTION, SOLUTION SUBCUTANEOUS at 21:04

## 2017-12-20 RX ADMIN — GUAIFENESIN AND DEXTROMETHORPHAN HYDROBROMIDE 2 TABLET: 600; 30 TABLET, EXTENDED RELEASE ORAL at 09:58

## 2017-12-20 RX ADMIN — IPRATROPIUM BROMIDE AND ALBUTEROL SULFATE 3 ML: .5; 3 SOLUTION RESPIRATORY (INHALATION) at 18:54

## 2017-12-20 RX ADMIN — METOPROLOL TARTRATE 50 MG: 50 TABLET ORAL at 21:08

## 2017-12-20 NOTE — PLAN OF CARE
Problem: Stroke (Ischemic) (Adult)  Goal: Signs and Symptoms of Listed Potential Problems Will be Absent or Manageable (Stroke)  Outcome: Ongoing (interventions implemented as appropriate)      Problem: Skin Integrity Impairment, Risk/Actual (Adult)  Goal: Identify Related Risk Factors and Signs and Symptoms  Outcome: Ongoing (interventions implemented as appropriate)

## 2017-12-20 NOTE — PROGRESS NOTES
"Neurology       Patient Care Team:  Ottoniel Toledo MD as PCP - General (Family Medicine)    Chief complaint left leg weakness       Subjective .     History:  Still feels weak in general, some cough, but left leg about back to normal        Objective     Vital Signs   Blood pressure 135/73, pulse 88, temperature 98.9 °F (37.2 °C), temperature source Oral, resp. rate 18, height 177.8 cm (70\"), weight 115 kg (254 lb 4.8 oz), SpO2 94 %.    Physical Exam:              Neuro: wd wm in nad, eating lunch, alert, fluent, no dysarthria  EOMI face symm TML  Motor -- no focal/lat weakness        Assessment/Plan     Transient left leg weakness s/p tPA, with negative MRI -- but multiple RFs for stroke, d/w pt again. On appropriate meds, previously poor compliance. Once again, OK for dc home from neuro perspective.    I discussed the patients findings and my recommendations with patient    Leticia Pena MD  12/20/17  12:18 PM    "

## 2017-12-20 NOTE — PROGRESS NOTES
Intensive Care Follow-up      LOS: 3 days     Mr. Denzel Harding, 57 y.o. male is followed for: CVA (cerebral vascular accident)     Subjective - Interval History     Still complaining of cough but little sputum production.  No fevers.  Sputum culture still pending  No infiltrate on chest x-ray  Still feels weak and not ready for home, yet    The patient's relevant past medical, surgical and social history were reviewed and updated in Epic as appropriate.     Objective     Infusions:    niCARdipine 5-15 mg/hr     Medications:    amLODIPine 5 mg Oral Daily   aspirin 325 mg Oral Daily   atorvastatin 80 mg Oral Nightly   cefuroxime 500 mg Oral Q12H   gabapentin 300 mg Oral BID   guaifenesin-dextromethorphan 2 tablet Oral BID   insulin detemir 30 Units Subcutaneous Q12H   insulin lispro 0-24 Units Subcutaneous 4x Daily With Meals & Nightly   ipratropium-albuterol 3 mL Nebulization Q6H While Awake - RT   lisinopril 40 mg Oral Daily   metoprolol tartrate 50 mg Oral Q12H   montelukast 10 mg Oral Nightly   ranolazine 500 mg Oral Q12H   topiramate 100 mg Oral Daily   Vortioxetine HBr 10 mg Oral Daily     Intake/Output       12/19/17 0700 - 12/20/17 0659    Intake (ml) 2350    Output (ml) 350    Net (ml) 2000        Vital Sign Min/Max for last 24 hours  Temp  Min: 98.3 °F (36.8 °C)  Max: 100.2 °F (37.9 °C)   BP  Min: 100/64  Max: 147/80   Pulse  Min: 70  Max: 95   Resp  Min: 16  Max: 24   SpO2  Min: 88 %  Max: 92 %   Flow (L/min)  Min: 2  Max: 3.5        Physical Exam:   GENERAL: Weight, no distress   HEENT: No adenopathy or thyromegaly   LUNGS: No wheezes or rhonchi   HEART: Regular rate and rhythm without murmurs   ABDOMEN: Soft, nontender   EXTREMITIES: Palpable pulses.  No cyanosis or edema   NEURO/PSYCH: Awake and alert.  Follows commands.  No focal motor deficit      Results from last 7 days  Lab Units 12/18/17  0359 12/17/17  1417 12/17/17  1416   WBC 10*3/mm3 7.33 8.07  --    HEMOGLOBIN g/dL 13.1 13.9  --     HEMOGLOBIN, POC g/dL  --   --  14.3   PLATELETS 10*3/mm3 171 183  --        Results from last 7 days  Lab Units 12/18/17  0359 12/17/17  1416   SODIUM mmol/L 137  --    POTASSIUM mmol/L 3.8  --    CO2 mmol/L 24.0  --    BUN mg/dL 13  --    CREATININE mg/dL 0.80 1.00   GLUCOSE mg/dL 214*  --      Estimated Creatinine Clearance: 131 mL/min (by C-G formula based on Cr of 0.8).      Results from last 7 days  Lab Units 12/18/17  0359   HEMOGLOBIN A1C % 10.20*           Lab Results   Component Value Date    LACTATE 1.7 11/27/2015          Images: Chest x-ray without consolidation or effusions    I reviewed the patient's results and images.     Impression      Hospital Problem List     * (Principal)CVA (S/P tPA)    Coronary artery disease involving native coronary artery of native heart with unstable angina pectoris    Overview Signed 8/15/2017  3:38 PM by LARRY Isaac     1. [facility] 8-11-17:  · Severe three-vessel coronary artery disease  · Preserved global and regional left ventricular systolic function  · Elevated left ventricular filling pressures consistent with diastolic heart failure.  2. CABG x 3 (8-15-17):         Hypercholesterolemia    Overview Signed 11/2/2016  9:34 AM by Shannon Torres      Assessed By: Yasmin Millard (Cardiology); Last Assessed: 13 Aug 2015         GERD (gastroesophageal reflux disease)    Diabetes mellitus    Essential hypertension    History of CVA (cerebrovascular accident)    Left-sided weakness    Acute bronchitis               Plan        Continue treatment for acute bronchitis  Continue physical therapy and occupational therapy  Assess for home needs  Continue CPAP  Flu screen  Transfer to the floor  Home soon     Plan of care and goals reviewed with mulitdisciplinary team at daily rounds   I discussed the patient's findings and my recommendations with patient and nursing staff       BERE Hicks MD  Pulmonary and Critical Care Medicine

## 2017-12-20 NOTE — PROGRESS NOTES
Continued Stay Note  [facility]     Patient Name: Denzel Harding  MRN: [id number]  Today's Date: 12/20/2017    Admit Date: 12/17/2017          Discharge Plan       12/20/17 1120    Case Management/Social Work Plan    Plan Home at discharge.    Additional Comments Pt plans on going home at discharge with his spouse.              Discharge Codes     None        Expected Discharge Date and Time     Expected Discharge Date Expected Discharge Time    Dec 22, 2017             Saloni Pillai RN

## 2017-12-20 NOTE — PROGRESS NOTES
Multidisciplinary Rounds    Time: 20min  Patient Name: Denzel Harding  Date of Encounter: 12/20/17 2:13 PM  MRN: [id number]  Admission date: 12/17/2017      Reason for visit: MDR.     Additional information obtained during MDR:  Pt ready for transfer to floor, check for flu.    Current diet: Diet Regular; Thin; Cardiac, Consistent Carbohydrate      Intervention:  Follow treatment plan  Care plan reviewed    Follow up:   RD to follow per protocol      Barbie Thorpe RD  2:13 PM

## 2017-12-21 LAB
BACTERIA SPEC RESP CULT: NORMAL
BACTERIA SPEC RESP CULT: NORMAL
GLUCOSE BLDC GLUCOMTR-MCNC: 217 MG/DL (ref 70–130)
GLUCOSE BLDC GLUCOMTR-MCNC: 246 MG/DL (ref 70–130)
GLUCOSE BLDC GLUCOMTR-MCNC: 276 MG/DL (ref 70–130)
GLUCOSE BLDC GLUCOMTR-MCNC: 294 MG/DL (ref 70–130)
GRAM STN SPEC: NORMAL
GRAM STN SPEC: NORMAL

## 2017-12-21 PROCEDURE — 82962 GLUCOSE BLOOD TEST: CPT

## 2017-12-21 PROCEDURE — 99232 SBSQ HOSP IP/OBS MODERATE 35: CPT | Performed by: INTERNAL MEDICINE

## 2017-12-21 PROCEDURE — 94760 N-INVAS EAR/PLS OXIMETRY 1: CPT

## 2017-12-21 PROCEDURE — 94640 AIRWAY INHALATION TREATMENT: CPT

## 2017-12-21 PROCEDURE — 92507 TX SP LANG VOICE COMM INDIV: CPT

## 2017-12-21 PROCEDURE — 63710000001 INSULIN DETEMIR PER 5 UNITS: Performed by: INTERNAL MEDICINE

## 2017-12-21 PROCEDURE — 94799 UNLISTED PULMONARY SVC/PX: CPT

## 2017-12-21 RX ORDER — IPRATROPIUM BROMIDE AND ALBUTEROL SULFATE 2.5; .5 MG/3ML; MG/3ML
3 SOLUTION RESPIRATORY (INHALATION) EVERY 6 HOURS PRN
Status: DISCONTINUED | OUTPATIENT
Start: 2017-12-21 | End: 2017-12-22 | Stop reason: HOSPADM

## 2017-12-21 RX ORDER — MONTELUKAST SODIUM 10 MG/1
TABLET ORAL
Qty: 30 TABLET | Refills: 0 | Status: SHIPPED | OUTPATIENT
Start: 2017-12-21 | End: 2018-03-21 | Stop reason: SDUPTHER

## 2017-12-21 RX ADMIN — INSULIN DETEMIR 30 UNITS: 100 INJECTION, SOLUTION SUBCUTANEOUS at 20:58

## 2017-12-21 RX ADMIN — RANOLAZINE 500 MG: 500 TABLET, FILM COATED, EXTENDED RELEASE ORAL at 20:53

## 2017-12-21 RX ADMIN — MONTELUKAST SODIUM 10 MG: 10 TABLET, FILM COATED ORAL at 20:52

## 2017-12-21 RX ADMIN — TOPIRAMATE 100 MG: 25 TABLET, FILM COATED ORAL at 08:06

## 2017-12-21 RX ADMIN — GUAIFENESIN AND DEXTROMETHORPHAN HYDROBROMIDE 2 TABLET: 600; 30 TABLET, EXTENDED RELEASE ORAL at 20:52

## 2017-12-21 RX ADMIN — ASPIRIN 325 MG ORAL TABLET 325 MG: 325 PILL ORAL at 08:07

## 2017-12-21 RX ADMIN — AMLODIPINE BESYLATE 5 MG: 5 TABLET ORAL at 08:07

## 2017-12-21 RX ADMIN — INSULIN LISPRO 8 UNITS: 100 INJECTION, SOLUTION INTRAVENOUS; SUBCUTANEOUS at 08:08

## 2017-12-21 RX ADMIN — IPRATROPIUM BROMIDE AND ALBUTEROL SULFATE 3 ML: .5; 3 SOLUTION RESPIRATORY (INHALATION) at 18:37

## 2017-12-21 RX ADMIN — RANOLAZINE 500 MG: 500 TABLET, FILM COATED, EXTENDED RELEASE ORAL at 10:14

## 2017-12-21 RX ADMIN — LISINOPRIL 40 MG: 40 TABLET ORAL at 10:13

## 2017-12-21 RX ADMIN — CEFUROXIME AXETIL 500 MG: 250 TABLET ORAL at 20:53

## 2017-12-21 RX ADMIN — GABAPENTIN 300 MG: 400 CAPSULE ORAL at 18:07

## 2017-12-21 RX ADMIN — CEFUROXIME AXETIL 500 MG: 250 TABLET ORAL at 10:14

## 2017-12-21 RX ADMIN — INSULIN LISPRO 12 UNITS: 100 INJECTION, SOLUTION INTRAVENOUS; SUBCUTANEOUS at 20:58

## 2017-12-21 RX ADMIN — INSULIN LISPRO 12 UNITS: 100 INJECTION, SOLUTION INTRAVENOUS; SUBCUTANEOUS at 12:21

## 2017-12-21 RX ADMIN — GUAIFENESIN AND DEXTROMETHORPHAN HYDROBROMIDE 2 TABLET: 600; 30 TABLET, EXTENDED RELEASE ORAL at 08:07

## 2017-12-21 RX ADMIN — ATORVASTATIN CALCIUM 80 MG: 40 TABLET, FILM COATED ORAL at 20:52

## 2017-12-21 RX ADMIN — METOPROLOL TARTRATE 50 MG: 50 TABLET ORAL at 08:07

## 2017-12-21 RX ADMIN — METOPROLOL TARTRATE 50 MG: 50 TABLET ORAL at 20:52

## 2017-12-21 RX ADMIN — IPRATROPIUM BROMIDE AND ALBUTEROL SULFATE 3 ML: .5; 3 SOLUTION RESPIRATORY (INHALATION) at 08:37

## 2017-12-21 RX ADMIN — GABAPENTIN 300 MG: 400 CAPSULE ORAL at 08:07

## 2017-12-21 RX ADMIN — INSULIN DETEMIR 30 UNITS: 100 INJECTION, SOLUTION SUBCUTANEOUS at 08:32

## 2017-12-21 RX ADMIN — INSULIN LISPRO 8 UNITS: 100 INJECTION, SOLUTION INTRAVENOUS; SUBCUTANEOUS at 18:07

## 2017-12-21 NOTE — PROGRESS NOTES
Adult Nutrition  Assessment/PES    Patient Name:  Denzel Harding  YOB: 1960  MRN: [id number]  Admit Date:  12/17/2017    Assessment Date:  12/21/2017    Comments:            Reason for Assessment       12/21/17 1122    Reason for Assessment    Reason For Assessment/Visit follow up protocol    Time Spent (min) 15    Diagnosis --   per notes this adm                        Nutrition Prescription Ordered       12/21/17 1123    Nutrition Prescription PO    Current PO Diet Regular    Common Modifiers Cardiac;Consistent Carbohydrate            Evaluation of Received Nutrient/Fluid Intake       12/21/17 1123    PO Evaluation    Number of Meals 6    % PO Intake 100            Problem/Interventions:        Problem 1       12/21/17 1123    Nutrition Diagnoses Problem 1    Problem 1 Nutrition Appropriate for Condition at this Time    Etiology (related to) MNT for Treatment/Condition    Signs/Symptoms (evidenced by) PO Intake    Percent (%) intake recorded 100 %    Over number of meals 6                    Intervention Goal       12/21/17 1124    Intervention Goal    PO Maintain intake            Nutrition Intervention       12/21/17 1124    Nutrition Intervention    RD/Tech Action Follow Tx progress              Education/Evaluation       12/21/17 1124    Monitor/Evaluation    Monitor Per protocol        Electronically signed by:  Aurea Melissa MS,RD,LD  12/21/17 11:25 AM

## 2017-12-21 NOTE — THERAPY DISCHARGE NOTE
Acute Care - Speech Language Pathology /Discharge  [facility]     Patient Name: Denzel Harding  : 1960  MRN: [id number]  Today's Date: 2017         Admit Date: 2017    Visit Dx:     ICD-10-CM ICD-9-CM   1. Left-sided weakness R53.1 728.87   2. Paresthesia of left arm and leg R20.2 782.0   3. Impaired functional mobility, balance, gait, and endurance Z74.09 V49.89   4. Impaired mobility and ADLs Z74.09 799.89     Patient Active Problem List   Diagnosis   • Coronary artery disease involving native coronary artery of native heart with unstable angina pectoris   • Lumbar radiculopathy   • Hypercholesterolemia   • Diabetic polyneuropathy associated with type 2 diabetes mellitus   • Migraine with aura and with status migrainosus   • Palpitations   • Seizure disorder   • GERD (gastroesophageal reflux disease)   • Brachial neuritis   • Cervical radiculopathy   • LOM (loss of memory)   • Anxiety   • Diabetes mellitus   • Lower back pain   • Essential hypertension   • History of CVA (cerebrovascular accident)   • Non morbid obesity due to excess calories   • Post-infarction pericarditis   • HCAP (healthcare-associated pneumonia)   • CVA (S/P tPA)   • Left-sided weakness   • Acute bronchitis              Adult Rehabilitation Note       17 1415 17 0828       Rehab Assessment/Intervention    Discipline speech language pathologist  -AC physical therapist  -LS     Document Type therapy note (daily note)  -AC discharge summary;therapy note (daily note)  -LS     Subjective Information no complaints;agree to therapy  -AC      Patient Effort, Rehab Treatment good  -AC good  -LS     Precautions/Limitations  fall precautions  -LS     Recorded by [AC] Marlene Gamble, MS CCC-SLP [LS] Kathrin Mera, PT     Vital Signs    Pre Systolic BP Rehab  118  -LS     Pre Treatment Diastolic BP  40  -LS     Post Systolic BP Rehab  104  -LS     Post Treatment Diastolic BP  69  -LS     Pretreatment Heart Rate  (beats/min)  82  -LS     Posttreatment Heart Rate (beats/min)  85  -LS     Pre SpO2 (%)  93  -LS     O2 Delivery Pre Treatment  supplemental O2   2L  -LS     Intra SpO2 (%)  91  -LS     O2 Delivery Intra Treatment  supplemental O2  -LS     Post SpO2 (%)  91  -LS     O2 Delivery Post Treatment  supplemental O2  -LS     Pre Patient Position  Supine  -LS     Intra Patient Position  Standing  -LS     Post Patient Position  Sitting  -LS     Recorded by  [LS] Kathrin Mera, PT     Pain Assessment    Pain Assessment No/denies pain  -AC 0-10  -LS     John-Fuentes FACES Pain Rating  0  -LS     Pain Score  0  -LS     Post Pain Score  0  -LS     Recorded by [AC] Marlene Gamble, MS CCC-SLP [LS] Kathrin Mera, PT     Cognitive Assessment/Intervention    Current Cognitive/Communication Assessment  functional  -LS     Orientation Status  oriented x 4  -LS     Follows Commands/Answers Questions  able to follow single-step instructions;100% of the time  -LS     Personal Safety  good awareness, safety precautions  -LS     Personal Safety Interventions  fall prevention program maintained;gait belt;nonskid shoes/slippers when out of bed  -LS     Recorded by  [LS] Kathrin Mera, PT     Improve articulation    Improve articulation: by over-articulating at word level;by over-articulating at phrase level;100%;without cues  -AC      Status: Improve articulation Achieved  -AC      Articulation Progress 100%;without cues;achieved target accuracy on task  -AC      Comments: Improve articulation Pt 100% intelligible this date. Pt agreed speech is nearly back to baseline now. Pt able to demonstrate overarticulation at the sentence level. Provided handouts w/ exercises for independent practice. No further needs identified warranting SLP services at this level of care. If issues arise once d/c'd spoke to pt about following-up w/ MD to see if HH/OP ST consult appropriate. Pt agreed.  -AC      Recorded by [AC] Marlene Gamble, MS CCC-SLP      Bed  Mobility, Assessment/Treatment    Bed Mob, Supine to Sit, [place]  independent  -LS     Recorded by  [LS] Kathrin Mera, PT     Transfer Assessment/Treatment    Transfers, Sit-Stand [place]  independent  -LS     Transfers, Stand-Sit [place]  independent  -LS     Recorded by  [LS] Kathrin Mera PT     Gait Assessment/Treatment    Gait, [place] Level  independent  -LS     Gait, Distance (Feet)  700  -LS     Gait, Comment  No noted LOB; c/o slight SOA by end of distance.  -LS     Recorded by  [LS] Kathrin Mera PT     Motor Skills/Interventions    Additional Documentation  Balance Skills Training (Group)  -LS     Recorded by  [LS] Kathrin Mera PT     Balance Skills Training    Sitting-Level of Assistance  Independent  -LS     Sitting-Balance Support  Feet supported  -LS     Standing-Level of Assistance  Independent  -LS     Static Standing Balance Support  No upper extremity supported  -LS     Gait Balance-Level of Assistance  Independent  -LS     Gait Balance Support  No upper extremity supported  -LS     Recorded by  [LS] Kathrin Mera PT     Therapy Exercises    Bilateral Lower Extremities  AROM:;15 reps;supine;ankle pumps/circles;glut sets;quad sets  -LS     Recorded by  [LS] Kathrin Mera PT     Positioning and Restraints    Pre-Treatment Position  in bed  -LS     Post Treatment Position  chair  -LS     In Chair  notified nsg;reclined;call light within reach;encouraged to call for assist;exit alarm on;legs elevated  -LS     Recorded by  [LS] Kathrin Mera PT       User Key  (r) = Recorded By, (t) = Taken By, (c) = Cosigned By    Initials Name Effective Dates    AC Marlene Gamble MS CCC-SLP 07/27/17 -     TRACEY Mera PT 06/19/15 -             EDUCATION  The patient has been educated in the following areas:   Communication Impairment.    SLP Recommendation and Plan  Plan of Care Review  Plan Of Care Reviewed With: patient  Progress: progress toward functional goals as  expected  Outcome Summary/Follow up Plan: Pt participated in speech tx. Pt 100% intelligible this date. Pt agreed speech is nearly back to baseline now. Pt able to demonstrate overarticulation at the sentence level w/o cues. Provided handouts w/ exercises for independent practice. No further needs identified warranting SLP services at this level of care. Pt agreed. If issues arise once d/c'd, spoke to pt about following-up w/ MD to see if HH/OP ST consult appropriate. SLP signing off @ this time, but is available should issues arise.      Time Calculation:         Time Calculation- SLP       12/21/17 1538          Time Calculation- SLP    SLP Start Time 1415  -AC      SLP Received On 12/21/17  -        User Key  (r) = Recorded By, (t) = Taken By, (c) = Cosigned By    Initials Name Provider Type    LONG Gamble MS CCC-SLP Speech and Language Pathologist          Therapy Charges for Today     Code Description Service Date Service Provider Modifiers Qty    [id number]  ST TREATMENT SPEECH 3 12/21/2017 Marlene Gamble MS CCC-SLP GN 1          SLP Discharge Summary  Anticipated Discharge Disposition: home  Reason for Discharge: All goals achieved, At baseline function  Outcomes Achieved: Able to achieve all goals within established timeline    Marlene Gamble MS CCC-SLP  12/21/2017

## 2017-12-21 NOTE — PROGRESS NOTES
[facility] Medicine Services  PROGRESS NOTE    Patient Name: Denzel Harding  : 1960  MRN: [id number]    Date of Admission: 2017  Length of Stay: 4  Primary Care Physician: Ottoniel Toledo MD    Subjective   Subjective     CC:  F/u CVA, SOB    HPI:  Still feeling SOB and coughing, weakness improved, feels almost ready to go home.    Review of Systems  Gen- No fevers, chills  CV- No chest pain, palpitations  Resp- + cough, dyspnea  GI- No N/V/D, abd pain    Otherwise ROS is negative except as mentioned in the HPI.    Objective   Objective     Vital Signs:   Temp:  [98.1 °F (36.7 °C)-98.9 °F (37.2 °C)] 98.3 °F (36.8 °C)  Heart Rate:  [65-89] 77  Resp:  [14-20] 18  BP: ()/(61-79) 130/79  Total (NIH Stroke Scale): 0     Physical Exam:  Constitutional: No acute distress, awake, alert  HENT: NCAT, mucous membranes moist  Respiratory: decreased breath sounds bilaterally with scattered wheezing, non-labored, respiratory effort normal   Cardiovascular: RRR, no murmurs, rubs, or gallops, palpable pedal pulses bilaterally  Gastrointestinal: Positive bowel sounds, soft, nontender, nondistended  Musculoskeletal: No bilateral ankle edema  Psychiatric: Appropriate affect, cooperative  Neurologic: Oriented x 3, strength symmetric in all extremities, Cranial Nerves grossly intact to confrontation, speech clear  Skin: No rashes      Results Reviewed:  I have personally reviewed current lab, radiology, and data and agree.      Results from last 7 days  Lab Units 17  03517  1417 17  1416 17  1415   WBC 10*3/mm3 7.33 8.07  --   --    HEMOGLOBIN g/dL 13.1 13.9  --   --    HEMOGLOBIN, POC g/dL  --   --  14.3  --    HEMATOCRIT % 39.8 41.3  --   --    HEMATOCRIT POC %  --   --  42  --    PLATELETS 10*3/mm3 171 183  --   --    INR   --   --   --  1.1       Results from last 7 days  Lab Units 17  03517  1417 17  1416   SODIUM mmol/L 137  --   --     POTASSIUM mmol/L 3.8  --   --    CHLORIDE mmol/L 105  --   --    CO2 mmol/L 24.0  --   --    BUN mg/dL 13  --   --    CREATININE mg/dL 0.80  --  1.00   GLUCOSE mg/dL 214*  --   --    CALCIUM mg/dL 8.8  --   --    ALT (SGPT) U/L 25 30  --    AST (SGOT) U/L 16 22  --      No results found for: BNP  No results found for: PHART    Microbiology Results Abnormal     Procedure Component Value - Date/Time    Respiratory Culture - Sputum, ET Suction [[id number]] Collected:  12/19/17 1341    Lab Status:  Final result Specimen:  Sputum from ET Suction Updated:  12/21/17 0744     Respiratory Culture --      Light growth (2+) Normal Respiratory Ana M     Gram Stain Result Many (4+) WBCs per low power field      Few (2+) Gram positive bacilli    Influenza A & B, RT PCR - Swab, Nasopharynx [[id number]]  (Normal) Collected:  12/20/17 1008    Lab Status:  Final result Specimen:  Swab from Nasopharynx Updated:  12/20/17 1120     Influenza A PCR Not Detected     Influenza B PCR Not Detected          Imaging Results (last 24 hours)     ** No results found for the last 24 hours. **        Results for orders placed during the hospital encounter of 12/17/17   Adult Transthoracic Echo Complete W/ Cont if Necessary Per Protocol (With Agitated Saline)    Narrative · Left ventricular systolic function is normal.  · Left ventricular diastolic dysfunction (grade I a) consistent with   impaired relaxation.  · Left ventricular wall thickness is consistent with mild concentric   hypertrophy.          I have reviewed the medications.    Assessment/Plan   Assessment / Plan     Hospital Problem List     * (Principal)CVA (S/P tPA)    Coronary artery disease involving native coronary artery of native heart with unstable angina pectoris    Overview Signed 8/15/2017  3:38 PM by LARRY Isaac     1. [facility] 8-11-17:  · Severe three-vessel coronary artery disease  · Preserved global and regional left ventricular systolic function  · Elevated left  ventricular filling pressures consistent with diastolic heart failure.  2. CABG x 3 (8-15-17):         Hypercholesterolemia    Overview Signed 11/2/2016  9:34 AM by Shannon Torres      Assessed By: Yasmin Millard (Cardiology); Last Assessed: 13 Aug 2015         GERD (gastroesophageal reflux disease)    Diabetes mellitus    Essential hypertension    History of CVA (cerebrovascular accident)    Left-sided weakness    Acute bronchitis             Brief Hospital Course to date:  Denzel Harding is a 57 y.o. male who presented to the ED on 12/17 with left facial droop, word salad and left sided weakness.  CT Head, CTA Head/Neck and CT Cerebral Perfusion were normal. He was given TPA at 1420 and it was stopped at 1525 on 12/17. He was admitted to the ICU per TPA protocol. MRI was negative. On 12/19 he developed some worsening SOB and yellow sputum production. He was started on empiric antibiotics and has continued to require oxygen therapy. He was transferred to the floor on 12/20/17.    Assessment & Plan:  - continue high intensity statin/ASA  - currently planning to d/c home at discharge  - still requiring oxygen (does not wear at home), will do ambulatory pulse ox on RA today to assess need for home O2  - continue ceftin for total of 7 days  - PRN duonebs Q4 and Q6hrs, pulmonary toilet    DVT Prophylaxis:  Mechanical s/p TPA    CODE STATUS: Full Code    Disposition: I expect the patient to be discharged home in the AM    Payton Ren,   12/21/17  2:14 PM

## 2017-12-21 NOTE — PLAN OF CARE
Problem: Patient Care Overview (Adult)  Goal: Plan of Care Review  Outcome: Ongoing (interventions implemented as appropriate)   12/21/17 0529   Coping/Psychosocial Response Interventions   Plan Of Care Reviewed With patient   Outcome Evaluation   Outcome Summary/Follow up Plan Pt transferred onto unit around 2250 last night. VS WNL, currently on 2L O2 CPAP. Pt is alert & oriented x4, calm and cooperative. NIHSS done with RN from ICU, scored 0. Will continue to monitor pt throughout shift.    Patient Care Overview   Progress improving     Goal: Adult Individualization and Mutuality  Outcome: Ongoing (interventions implemented as appropriate)    Goal: Discharge Needs Assessment  Outcome: Ongoing (interventions implemented as appropriate)      Problem: Stroke (Ischemic) (Adult)  Goal: Signs and Symptoms of Listed Potential Problems Will be Absent or Manageable (Stroke)  Outcome: Ongoing (interventions implemented as appropriate)   12/21/17 0529   Stroke (Ischemic)   Problems Assessed (Stroke (Ischemic)/TIA) all   Problems Present (Stroke (Ischemic)/TIA) none       Problem: Skin Integrity Impairment, Risk/Actual (Adult)  Goal: Identify Related Risk Factors and Signs and Symptoms  Outcome: Ongoing (interventions implemented as appropriate)   12/21/17 0529   Skin Integrity Impairment, Risk/Actual   Skin Integrity Impairment, Risk/Actual: Related Risk Factors sensory impairment     Goal: Skin Integrity/Wound Healing  Outcome: Ongoing (interventions implemented as appropriate)   12/21/17 0529   Skin Integrity Impairment, Risk/Actual (Adult)   Skin Integrity/Wound Healing making progress toward outcome

## 2017-12-21 NOTE — PLAN OF CARE
Problem: Patient Care Overview (Adult)  Goal: Plan of Care Review  Outcome: Ongoing (interventions implemented as appropriate)   12/21/17 1456   Coping/Psychosocial Response Interventions   Plan Of Care Reviewed With patient   Outcome Evaluation   Outcome Summary/Follow up Plan Pt participated in speech tx. Pt 100% intelligible this date. Pt agreed speech is nearly back to baseline now. Pt able to demonstrate overarticulation at the sentence level w/o cues. Provided handouts w/ exercises for independent practice. No further needs identified warranting SLP services at this level of care. Pt agreed. If issues arise once d/c'd, spoke to pt about following-up w/ MD to see if HH/OP ST consult appropriate. SLP signing off @ this time, but is available should issues arise.   Patient Care Overview   Progress progress toward functional goals as expected

## 2017-12-22 ENCOUNTER — APPOINTMENT (OUTPATIENT)
Dept: CARDIAC REHAB | Facility: HOSPITAL | Age: 57
End: 2017-12-22

## 2017-12-22 VITALS
WEIGHT: 256.3 LBS | BODY MASS INDEX: 41.19 KG/M2 | RESPIRATION RATE: 18 BRPM | OXYGEN SATURATION: 89 % | TEMPERATURE: 97.8 F | SYSTOLIC BLOOD PRESSURE: 109 MMHG | HEART RATE: 71 BPM | HEIGHT: 66 IN | DIASTOLIC BLOOD PRESSURE: 69 MMHG

## 2017-12-22 PROBLEM — R53.1 LEFT-SIDED WEAKNESS: Status: RESOLVED | Noted: 2017-12-17 | Resolved: 2017-12-22

## 2017-12-22 LAB
GLUCOSE BLDC GLUCOMTR-MCNC: 149 MG/DL (ref 70–130)
GLUCOSE BLDC GLUCOMTR-MCNC: 254 MG/DL (ref 70–130)

## 2017-12-22 PROCEDURE — 63710000001 INSULIN DETEMIR PER 5 UNITS: Performed by: INTERNAL MEDICINE

## 2017-12-22 PROCEDURE — 97530 THERAPEUTIC ACTIVITIES: CPT

## 2017-12-22 PROCEDURE — 99239 HOSP IP/OBS DSCHRG MGMT >30: CPT | Performed by: NURSE PRACTITIONER

## 2017-12-22 PROCEDURE — 82962 GLUCOSE BLOOD TEST: CPT

## 2017-12-22 RX ORDER — CEFUROXIME AXETIL 500 MG/1
500 TABLET ORAL EVERY 12 HOURS SCHEDULED
Qty: 7 TABLET | Refills: 0 | Status: SHIPPED | OUTPATIENT
Start: 2017-12-22 | End: 2017-12-26

## 2017-12-22 RX ORDER — INSULIN GLARGINE 300 U/ML
60 INJECTION, SOLUTION SUBCUTANEOUS DAILY
Refills: 0
Start: 2017-12-22 | End: 2020-12-03 | Stop reason: SDUPTHER

## 2017-12-22 RX ORDER — INSULIN LISPRO 100 [IU]/ML
10 INJECTION, SOLUTION INTRAVENOUS; SUBCUTANEOUS
Refills: 0
Start: 2017-12-22 | End: 2020-12-03 | Stop reason: SDUPTHER

## 2017-12-22 RX ORDER — ATORVASTATIN CALCIUM 80 MG/1
80 TABLET, FILM COATED ORAL NIGHTLY
Qty: 30 TABLET | Refills: 0 | Status: SHIPPED | OUTPATIENT
Start: 2017-12-22 | End: 2020-04-27 | Stop reason: SDUPTHER

## 2017-12-22 RX ORDER — ASPIRIN 325 MG
325 TABLET ORAL DAILY
Start: 2017-12-23 | End: 2020-03-02

## 2017-12-22 RX ADMIN — GABAPENTIN 300 MG: 400 CAPSULE ORAL at 08:26

## 2017-12-22 RX ADMIN — CEFUROXIME AXETIL 500 MG: 250 TABLET ORAL at 08:26

## 2017-12-22 RX ADMIN — TOPIRAMATE 100 MG: 25 TABLET, FILM COATED ORAL at 08:26

## 2017-12-22 RX ADMIN — AMLODIPINE BESYLATE 5 MG: 5 TABLET ORAL at 08:26

## 2017-12-22 RX ADMIN — ASPIRIN 325 MG ORAL TABLET 325 MG: 325 PILL ORAL at 08:26

## 2017-12-22 RX ADMIN — GUAIFENESIN AND DEXTROMETHORPHAN HYDROBROMIDE 2 TABLET: 600; 30 TABLET, EXTENDED RELEASE ORAL at 08:26

## 2017-12-22 RX ADMIN — LISINOPRIL 40 MG: 40 TABLET ORAL at 08:27

## 2017-12-22 RX ADMIN — METOPROLOL TARTRATE 50 MG: 50 TABLET ORAL at 08:26

## 2017-12-22 RX ADMIN — RANOLAZINE 500 MG: 500 TABLET, FILM COATED, EXTENDED RELEASE ORAL at 08:27

## 2017-12-22 RX ADMIN — INSULIN DETEMIR 30 UNITS: 100 INJECTION, SOLUTION SUBCUTANEOUS at 09:02

## 2017-12-22 RX ADMIN — INSULIN LISPRO 12 UNITS: 100 INJECTION, SOLUTION INTRAVENOUS; SUBCUTANEOUS at 12:08

## 2017-12-22 NOTE — PROGRESS NOTES
Continued Stay Note  [facility]     Patient Name: Denzel Harding  MRN: [id number]  Today's Date: 12/22/2017    Admit Date: 12/17/2017          Discharge Plan       12/22/17 1140    Case Management/Social Work Plan    Plan Home    Patient/Family In Agreement With Plan yes    Additional Comments Spoke with patient.  His plan is still to return home with wife at discharge.  No discharge needs identified at this time.  Linda Martino RN [phone]              Discharge Codes     None        Expected Discharge Date and Time     Expected Discharge Date Expected Discharge Time    Dec 22, 2017             Linda Martnio RN

## 2017-12-22 NOTE — PLAN OF CARE
Problem: Skin Integrity Impairment, Risk/Actual (Adult)  Goal: Identify Related Risk Factors and Signs and Symptoms  Outcome: Ongoing (interventions implemented as appropriate)   12/21/17 1952   Skin Integrity Impairment, Risk/Actual   Skin Integrity Impairment, Risk/Actual: Related Risk Factors mechanical factors  (decreased mobility)   Signs and Symptoms (Skin Integrity Impairment) other (see comments)  (none)     Goal: Skin Integrity/Wound Healing  Outcome: Ongoing (interventions implemented as appropriate)   12/21/17 1952   Skin Integrity Impairment, Risk/Actual (Adult)   Skin Integrity/Wound Healing making progress toward outcome

## 2017-12-22 NOTE — PLAN OF CARE
Problem: Patient Care Overview (Adult)  Goal: Plan of Care Review  Outcome: Outcome(s) achieved Date Met: 12/22/17 12/22/17 1015   Coping/Psychosocial Response Interventions   Plan Of Care Reviewed With patient   Outcome Evaluation   Outcome Summary/Follow up Plan Pt met OT goals. Pt reports at baseline with exception of dizziness with no O2NC. Pt requires no further OT at this time. Pt will discharge home with spouse.   Patient Care Overview   Progress improving       Problem: Stroke (Ischemic) (Adult)  Goal: Signs and Symptoms of Listed Potential Problems Will be Absent or Manageable (Stroke)  Outcome: Outcome(s) achieved Date Met: 12/22/17 12/22/17 1015   Stroke (Ischemic)   Problems Assessed (Stroke (Ischemic)/TIA) all   Problems Present (Stroke (Ischemic)/TIA) none       Problem: Inpatient Occupational Therapy  Goal: Transfer Training Goal 1 LTG- OT  Outcome: Outcome(s) achieved Date Met: 12/22/17 12/18/17 1142 12/22/17 1015   Transfer Training OT LTG   Transfer Training OT LTG, Date Established 12/18/17 --    Transfer Training OT LTG, Time to Achieve by discharge --    Transfer Training OT LTG, Activity Type toilet;sit to stand/stand to sit --    Transfer Training OT LTG, [place] Level supervision required --    Transfer Training OT LTG, Additional Goal AAD --    Transfer Training OT LTG, Outcome --  goal met     Goal: LB Dressing Goal LTG- OT  Outcome: Outcome(s) achieved Date Met: 12/22/17 12/18/17 1142 12/22/17 1015   LB Dressing OT LTG   LB Dressing Goal OT LTG, Date Established 12/18/17 --    LB Dressing Goal OT LTG, Time to Achieve by discharge --    LB Dressing Goal OT LTG, Activity Type Don pants/socks --    LB Dressing Goal OT LTG, [place] Level supervision required --    LB Dressing Goal OT LTG, Additional Goal AAD --    LB Dressing Goal OT LTG, Outcome --  goal met     Goal: Functional Mobility Goal LTG- OT  Outcome: Outcome(s) achieved Date Met: 12/22/17 12/18/17 1142 12/22/17  1015   Functional Mobility OT LTG   Functional Mobility Goal OT LTG, Date Established 12/18/17 --    Functional Mobility Goal OT LTG, Time to Achieve by discharge --    Functional Mobility Goal OT LTG, [place] Level standby assist --    Functional Mobility Goal OT LTG, Assist Device appropriate AD --    Functional Mobility Goal OT LTG, Distance to Achieve to the bathroom --    Functional Mobility Goal OT LTG, Outcome --  goal met

## 2017-12-22 NOTE — PLAN OF CARE
Problem: Patient Care Overview (Adult)  Goal: Plan of Care Review  Outcome: Ongoing (interventions implemented as appropriate)  VSS. No complaints of pain or discomfort. Alert and oriented x4. FSBS elevated, SSI administer. Patient on CPAP overnight. Patient passed ambulatory 02 satu check last night, ambulated in hallway sating between 91% - 96%.  Goal: Adult Individualization and Mutuality  Outcome: Ongoing (interventions implemented as appropriate)    Goal: Discharge Needs Assessment  Outcome: Ongoing (interventions implemented as appropriate)      Problem: Stroke (Ischemic) (Adult)  Goal: Signs and Symptoms of Listed Potential Problems Will be Absent or Manageable (Stroke)  Outcome: Ongoing (interventions implemented as appropriate)      Problem: Skin Integrity Impairment, Risk/Actual (Adult)  Goal: Identify Related Risk Factors and Signs and Symptoms  Outcome: Ongoing (interventions implemented as appropriate)    Goal: Skin Integrity/Wound Healing  Outcome: Ongoing (interventions implemented as appropriate)

## 2017-12-22 NOTE — PLAN OF CARE
Problem: Patient Care Overview (Adult)  Goal: Plan of Care Review  Outcome: Ongoing (interventions implemented as appropriate)   12/21/17 1953   Coping/Psychosocial Response Interventions   Plan Of Care Reviewed With patient   Outcome Evaluation   Outcome Summary/Follow up Plan VSS, no c/o pain or discomfort. Pt alert and oriented x4. FSBS elevated, ssi administered. Pt on 2L NC AAT. PRN nebs adminstered.    Patient Care Overview   Progress improving       Problem: Stroke (Ischemic) (Adult)  Goal: Signs and Symptoms of Listed Potential Problems Will be Absent or Manageable (Stroke)  Outcome: Ongoing (interventions implemented as appropriate)   12/21/17 1953   Stroke (Ischemic)   Problems Assessed (Stroke (Ischemic)/TIA) all   Problems Present (Stroke (Ischemic)/TIA) none

## 2017-12-22 NOTE — THERAPY DISCHARGE NOTE
Acute Care - Occupational Therapy Treatment Note/Discharge  Psychiatric     Patient Name: Denzel Harding  : 1960  MRN: [id number]  Today's Date: 2017  Onset of Illness/Injury or Date of Surgery Date: 17  Date of Referral to OT: 17  Referring Physician: ANTONIO Eisenberg      Admit Date: 2017    Visit Dx:     ICD-10-CM ICD-9-CM   1. Left-sided weakness R53.1 728.87   2. Paresthesia of left arm and leg R20.2 782.0   3. Impaired functional mobility, balance, gait, and endurance Z74.09 V49.89   4. Impaired mobility and ADLs Z74.09 799.89     Patient Active Problem List   Diagnosis   • Coronary artery disease involving native coronary artery of native heart with unstable angina pectoris   • Lumbar radiculopathy   • Hypercholesterolemia   • Diabetic polyneuropathy associated with type 2 diabetes mellitus   • Migraine with aura and with status migrainosus   • Palpitations   • Seizure disorder   • GERD (gastroesophageal reflux disease)   • Brachial neuritis   • Cervical radiculopathy   • LOM (loss of memory)   • Anxiety   • Diabetes mellitus   • Lower back pain   • Essential hypertension   • History of CVA (cerebrovascular accident)   • Non morbid obesity due to excess calories   • Post-infarction pericarditis   • HCAP (healthcare-associated pneumonia)   • CVA (S/P tPA)   • Left-sided weakness   • Acute bronchitis             Adult Rehabilitation Note       17 0941 17 1415       Rehab Assessment/Intervention    Discipline occupational therapist  -AN speech language pathologist  -AC     Document Type discharge summary;therapy note (daily note)  -AN therapy note (daily note)  -AC     Subjective Information no complaints;agree to therapy  -AN no complaints;agree to therapy  -AC     Patient Effort, Rehab Treatment excellent  -AN good  -AC     Symptoms Noted During/After Treatment shortness of breath   vitals monitored  -AN      Recorded by [AN] Marisa Zuleta OT [AC] Marlene Gamble, MS  CCC-SLP     Vital Signs    Pre Systolic BP Rehab 134  -AN      Pre Treatment Diastolic BP 81  -AN      Post Systolic BP Rehab 125  -AN      Post Treatment Diastolic BP 76  -AN      Pre Patient Position Sitting  -AN      Intra Patient Position Standing  -AN      Post Patient Position Sitting  -AN      Recorded by [AN] Marisa Zuleta OT      Pain Assessment    Pain Assessment No/denies pain  -AN No/denies pain  -AC     Recorded by [AN] Marisa Zuleta OT [AC] Marlene Gamble MS CCC-SLP     Vision Assessment/Intervention    Visual Impairment Comment still has decreased periphreal bilaterally, pt compensates with head turns  -AN      Recorded by [AN] Marisa Zuleta OT      Cognitive Assessment/Intervention    Current Cognitive/Communication Assessment functional  -AN      Orientation Status oriented x 4  -AN      Follows Commands/Answers Questions 100% of the time  -AN      Personal Safety WNL/WFL  -AN      Recorded by [AN] Marisa Zuleta OT      Improve articulation    Improve articulation:  by over-articulating at word level;by over-articulating at phrase level;100%;without cues  -AC     Status: Improve articulation  Achieved  -AC     Articulation Progress  100%;without cues;achieved target accuracy on task  -AC     Comments: Improve articulation  Pt 100% intelligible this date. Pt agreed speech is nearly back to baseline now. Pt able to demonstrate overarticulation at the sentence level. Provided handouts w/ exercises for independent practice. No further needs identified warranting SLP services at this level of care. If issues arise once d/c'd spoke to pt about following-up w/ MD to see if HH/OP ST consult appropriate. Pt agreed.  -AC     Recorded by  [AC] Marlene Gamble MS CCC-SLP     Bed Mobility, Assessment/Treatment    Bed Mob, Supine to Sit, [place] conditional independence  -AN      Recorded by [AN] Marisa Zuleta OT      Transfer Assessment/Treatment    Transfers, Sit-Stand [place] independent  -AN       Transfers, Stand-Sit [place] independent  -AN      Walk-In Shower Transfer, [place] conditional independence   simulated  -AN      Recorded by [AN] Marisa Zuleta OT      Functional Mobility    Functional Mobility- Ind. Level independent  -AN      Functional Mobility-Distance (Feet) 200  -AN      Recorded by [AN] Marisa Zuleta OT      Lower Body Dressing Assessment/Training    LB Dressing Assess/Train, Clothing Type doffing:;donning:;pants;socks  -AN      LB Dressing Assess/Train, Position sitting  -AN      LB Dressing Assess/Train, [place] conditional independence  -AN      Recorded by [AN] Marisa Zuleta OT      Balance Skills Training    Sitting-Level of Assistance Independent  -AN      Standing-Level of Assistance Independent  -AN      Standing-Balance Activities Weight Shift A-P;Weight Shift R-L   pt reports he has reacher at home  -AN      Recorded by [DANIEL] Marisa Zuleta OT      Gross Motor Coordination Training    Gross Motor Skill, Impairments Detail WFL  -AN      Recorded by [DANIEL] Marisa Zuleta OT      Positioning and Restraints    Pre-Treatment Position in bed  -AN      Post Treatment Position bed  -AN      In Bed sitting EOB;call light within reach;encouraged to call for assist;notified nsg  -AN      Recorded by [AN] Marisa Zuleta OT        User Key  (r) = Recorded By, (t) = Taken By, (c) = Cosigned By    Initials Name Effective Dates    AN Marisa Zuleta OT 06/22/15 -     LONG Gamble MS CCC-SLP 07/27/17 -                 OT Goals       12/22/17 1015 12/18/17 1142       Transfer Training OT LTG    Transfer Training OT LTG, Date Established  12/18/17  -CL     Transfer Training OT LTG, Time to Achieve  by discharge  -CL     Transfer Training OT LTG, Activity Type  toilet;sit to stand/stand to sit  -CL     Transfer Training OT LTG, [place] Level  supervision required  -CL     Transfer Training OT LTG, Additional Goal  AAD  -CL     Transfer Training OT LTG, Outcome goal met  -AN       LB Dressing OT LTG    LB Dressing Goal OT LTG, Date Established  12/18/17  -CL     LB Dressing Goal OT LTG, Time to Achieve  by discharge  -CL     LB Dressing Goal OT LTG, Activity Type  Don pants/socks  -CL     LB Dressing Goal OT LTG, [place] Level  supervision required  -CL     LB Dressing Goal OT LTG, Additional Goal  AAD  -CL     LB Dressing Goal OT LTG, Outcome goal met  -AN      Functional Mobility OT LTG    Functional Mobility Goal OT LTG, Date Established  12/18/17  -CL     Functional Mobility Goal OT LTG, Time to Achieve  by discharge  -CL     Functional Mobility Goal OT LTG, [place] Level  standby assist  -CL     Functional Mobility Goal OT LTG, Assist Device  appropriate AD  -CL     Functional Mobility Goal OT LTG, Distance to Achieve  to the bathroom  -CL     Functional Mobility Goal OT LTG, Outcome goal met  -AN        User Key  (r) = Recorded By, (t) = Taken By, (c) = Cosigned By    Initials Name Provider Type    AN Marisa Zuleta, OT Occupational Therapist    CL Rosita [place], OT Occupational Therapist          Occupational Therapy Education     Title: PT OT SLP Therapies (Done)     Topic: Occupational Therapy (Done)     Point: ADL training (Done)    Description: Instruct learner(s) on proper safety adaptation and remediation techniques during self care or transfers.   Instruct in proper use of assistive devices.    Learning Progress Summary    Learner Readiness Method Response Comment Documented by Status   Patient Acceptance E VU,NR Educated on home safety and energy conservation. AN 12/22/17 1014 Done    Acceptance E,D NR Pt educated on appropriate safety precautions, t/f techniques, and role fo therapy. CL 12/18/17 1142 Active               Point: Home exercise program (Done)    Description: Instruct learner(s) on appropriate technique for monitoring, assisting and/or progressing therapeutic exercises/activities.    Learning Progress Summary    Learner Readiness Method Response  Comment Documented by Status   Patient Acceptance E VU,NR Educated on home safety and energy conservation. AN 12/22/17 1014 Done               Point: Precautions (Done)    Description: Instruct learner(s) on prescribed precautions during self-care and functional transfers.    Learning Progress Summary    Learner Readiness Method Response Comment Documented by Status   Patient Acceptance E VU,NR Educated on home safety and energy conservation. AN 12/22/17 1014 Done    Acceptance E,D NR Pt educated on appropriate safety precautions, t/f techniques, and role fo therapy.  12/18/17 1142 Active               Point: Body mechanics (Done)    Description: Instruct learner(s) on proper positioning and spine alignment during self-care, functional mobility activities and/or exercises.    Learning Progress Summary    Learner Readiness Method Response Comment Documented by Status   Patient Acceptance E VU,NR Educated on home safety and energy conservation. AN 12/22/17 1014 Done    Acceptance E,D NR Pt educated on appropriate safety precautions, t/f techniques, and role fo therapy.  12/18/17 1142 Active                      User Key     Initials Effective Dates Name Provider Type Discipline    AN 06/22/15 -  Marisa Zuleta, OT Occupational Therapist OT     06/08/16 -  Rosita Medellin OT Occupational Therapist OT                OT Recommendation and Plan  Anticipated Equipment Needs At Discharge:  (TBA further)  Anticipated Discharge Disposition: home with assist  Therapy Frequency: daily  Plan of Care Review  Plan Of Care Reviewed With: patient  Progress: improving  Outcome Summary/Follow up Plan: Pt met OT goals. Pt reports at baseline with exception of dizziness with no O2NC. Pt requires no further OT at this time. Pt will discharge home with spouse.          Outcome Measures       12/22/17 0902          How much help from another is currently needed...    Putting on and taking off regular lower body clothing? 4  -AN       Bathing (including washing, rinsing, and drying) 4  -AN      Toileting (which includes using toilet bed pan or urinal) 4  -AN      Putting on and taking off regular upper body clothing 4  -AN      Taking care of personal grooming (such as brushing teeth) 4  -AN      Eating meals 4  -AN      Score 24  -AN      Modified [place] Scale    Modified [place] Scale 1 - No significant disability despite symptoms.  Able to carry out all usual duties and activities.  -AN        User Key  (r) = Recorded By, (t) = Taken By, (c) = Cosigned By    Initials Name Provider Type    DANIEL Zuleta OT Occupational Therapist           Time Calculation:          Time Calculation- OT       12/22/17 1017          Time Calculation- OT    OT Start Time 0941  -AN      Total Timed Code Minutes- OT 30 minute(s)  -AN      OT Received On 12/22/17  -AN        User Key  (r) = Recorded By, (t) = Taken By, (c) = Cosigned By    Initials Name Provider Type    DANIEL Zuleta OT Occupational Therapist          Therapy Charges for Today     Code Description Service Date Service Provider Modifiers Qty    [id number]  OT THERAPEUTIC ACT EA 15 MIN 12/22/2017 Marisa Zuleta OT GO 2               OT Discharge Summary  Anticipated Discharge Disposition: home with assist  Reason for Discharge: At baseline function  Outcomes Achieved: Refer to plan of care for updates on goals achieved  Discharge Destination: Home with assist    Marisa Zuleta OT  12/22/2017

## 2017-12-22 NOTE — DISCHARGE SUMMARY
[facility] Medicine Services  DISCHARGE SUMMARY    Patient Name: Denzel Harding  : 1960  MRN: [id number]    Date of Admission: 2017  Date of Discharge:  2017  Primary Care Physician: Ottoniel Toledo MD    Consults     Date and Time Order Name Status Description    2017 1413 Consult Interventional Neurologist and/or Stroke Team Completed         Hospital Course     Presenting Problem:   Left-sided weakness [R53.1]    Active Hospital Problems (** Indicates Principal Problem)    Diagnosis Date Noted   • **CVA (S/P tPA) [I63.9] 2017   • Acute bronchitis [J20.9] 2017   • History of CVA (cerebrovascular accident) [Z86.73] 08/15/2017   • Essential hypertension [I10] 2017   • Diabetes mellitus [E11.9] 2017   • GERD (gastroesophageal reflux disease) [K21.9] 2017   • Hypercholesterolemia [E78.00] 2016   • Coronary artery disease involving native coronary artery of native heart with unstable angina pectoris [I25.110] 2016      Resolved Hospital Problems    Diagnosis Date Noted Date Resolved   • Left-sided weakness [R53.1] 2017      Hospital Course:  Denzel Harding is a 57 y.o. male with past medical history significant for CAD status post stenting and more recently CABG in August, diabetes, hypertension, hyperlipidemia and reported stroke about 3 years ago.  He presented to [facility] ED with complaints of left facial droop, left-sided weakness, and slurred speech.  CT head, CTA head/neck and CT surgery will perfusion were normal.  He was given TPA at 1420 and it was stopped at 1525 on 17.  He was admitted to the ICU per TPA protocol.  MRI was negative.  On 19 he developed some worsening shortness of breath and yellow sputum production.  He was started on empiric antibiotics and has continued to require intermittently.  He was transferred to the floor on 17 the hospital medicine service.  Neurology has  followed during hospitalization and recommend continuing high intensity statin and aspirin 325 mg.  On day of discharge, patient's room air saturations are 94-96 percent.  He ambulated in the cox and the lowest his oxygen saturation dropped to was 89%.  Patient has improved clinically and is ready to be discharge home. Discharge plans and instructions were reviewed with him and he verbalized an understanding.  Patient was given specific instructions on how to continue home insulin regimen.  He will monitor glucose level and provide information to his PCP at his follow up appointment for further adjustments.  Patient is to follow up with his PCP within 1 week for hospital follow up.          Day of Discharge     HPI:   Patient is ambulating in room.  Does have dyspnea on exertion but saturations stay above 89% on room air.  Patient denies chest pain or abdominal pain.  States he feels better and is ready for home today.    Review of Systems  Gen- No fevers, chills  CV- No chest pain, palpitations  Resp- No cough, mild dyspnea with exertion   GI- No N/V/D, abd pain    Otherwise ROS is negative except as mentioned in the HPI.    Vital Signs:   Temp:  [97.4 °F (36.3 °C)-99.6 °F (37.6 °C)] 97.8 °F (36.6 °C)  Heart Rate:  [65-84] 71  Resp:  [16-18] 18  BP: (101-136)/(60-85) 109/69     Physical Exam:  Constitutional: No acute distress, awake, alert  Eyes: PERRLA, sclerae anicteric, no conjunctival injection  HENT: NCAT, mucous membranes moist  Neck: Supple, no thyromegaly, no lymphadenopathy, trachea midline  Respiratory: Clear to auscultation bilaterally, nonlabored respirations   Cardiovascular: RRR, no murmurs, rubs, or gallops, palpable pedal pulses bilaterally  Gastrointestinal: Positive bowel sounds, soft, nontender, nondistended  Musculoskeletal: No bilateral ankle edema, no clubbing or cyanosis to extremities  Psychiatric: Appropriate affect, cooperative  Neurologic: Oriented x 3, strength symmetric in all  extremities, Cranial Nerves grossly intact to confrontation, speech clear  Skin: No rashes    Pertinent  and/or Most Recent Results       Results from last 7 days  Lab Units 12/18/17  0359 12/17/17  1417 12/17/17  1416   WBC 10*3/mm3 7.33 8.07  --    HEMOGLOBIN g/dL 13.1 13.9  --    HEMOGLOBIN, POC g/dL  --   --  14.3   HEMATOCRIT % 39.8 41.3  --    HEMATOCRIT POC %  --   --  42   PLATELETS 10*3/mm3 171 183  --    SODIUM mmol/L 137  --   --    POTASSIUM mmol/L 3.8  --   --    CHLORIDE mmol/L 105  --   --    CO2 mmol/L 24.0  --   --    BUN mg/dL 13  --   --    CREATININE mg/dL 0.80  --  1.00   GLUCOSE mg/dL 214*  --   --    CALCIUM mg/dL 8.8  --   --        Results from last 7 days  Lab Units 12/18/17  0359 12/17/17  1417 12/17/17  1415   BILIRUBIN mg/dL 0.5  --   --    ALK PHOS U/L 83  --   --    ALT (SGPT) U/L 25 30  --    AST (SGOT) U/L 16 22  --    PROTIME seconds  --   --  13.1   INR   --   --  1.1   APTT seconds  --  25.4  --        Results from last 7 days  Lab Units 12/18/17  0359   CHOLESTEROL mg/dL 112   TRIGLYCERIDES mg/dL 127   HDL CHOL mg/dL 28*   LDL CHOL mg/dL 75       Results from last 7 days  Lab Units 12/18/17  0359   HEMOGLOBIN A1C % 10.20*     Brief Urine Lab Results  (Last result in the past 365 days)      Color   Clarity   Blood   Leuk Est   Nitrite   Protein   CREAT   Urine HCG        10/28/17 1616 Yellow Clear Negative Negative Negative Negative               Microbiology Results Abnormal     Procedure Component Value - Date/Time    Respiratory Culture - Sputum, ET Suction [[id number]] Collected:  12/19/17 1341    Lab Status:  Final result Specimen:  Sputum from ET Suction Updated:  12/21/17 0767     Respiratory Culture --      Light growth (2+) Normal Respiratory Ana M     Gram Stain Result Many (4+) WBCs per low power field      Few (2+) Gram positive bacilli    Influenza A & B, RT PCR - Swab, Nasopharynx [[id number]]  (Normal) Collected:  12/20/17 1008    Lab Status:  Final result Specimen:   Swab from Nasopharynx Updated:  12/20/17 1120     Influenza A PCR Not Detected     Influenza B PCR Not Detected          Imaging Results (all)     Procedure Component Value Units Date/Time    CT Head Without Contrast [[id number]] Collected:  12/17/17 1424     Updated:  12/17/17 1428    Narrative:       EXAMINATION: CT HEAD WO CONTRAST - 12/17/2017     INDICATION: Code 19.      TECHNIQUE: CT scan of the head was performed at 5 mm intervals. No  intravenous contrast was utilized.     The radiation dose reduction device was turned on for each scan per the  ALARA (As Low as Reasonably Achievable) protocol.     COMPARISON: MR of the brain dated 4/11/2017 (no acute findings).     FINDINGS: There is no intracranial mass. There is no hemorrhage. There  is no midline shift or extra-axial fluid collection.       Impression:       Normal unenhanced CT scan of the head.     TIME OF  EXAM:  1353 HOURS  REPORT TO ER:  1408 HOURS     DICTATED:     12/17/2017  EDITED:          12/17/2017        This report was finalized on 12/17/2017 2:26 PM by Dr. South Gonzalez MD.       CT Cerebral Perfusion With & Without Contrast [[id number]] Collected:  12/17/17 1603     Updated:  12/17/17 1607    Narrative:       EXAMINATION: CT CEREBRAL PERFUSION W WO CONTRAST - 12/17/2017     INDICATION: Stroke protocol.     TECHNIQUE: CT perfusion imaging was performed with data acquisition  regarding blood volume, blood flow, mean transit time and T-1/2.     The radiation dose reduction device was turned on for each scan per the  ALARA (As Low as Reasonably Achievable) protocol.     COMPARISON: None.     FINDINGS: The datasets indicate no significant blood flow abnormality or  asymmetry. Similarly, cerebral blood volume is symmetrical and uniform  with no mismatched defect.       Impression:       Normal CT perfusion imaging.     DICTATED:     12/17/2017  EDITED:          12/17/2017        This report was finalized on 12/17/2017 4:05 PM by Dr. South Gonzalez  MD.       CT Angiogram Neck With & Without Contrast [[id number]] Collected:  12/17/17 1602     Updated:  12/17/17 1608    Narrative:       EXAMINATION: CT ANGIOGRAM NECK W WO CONTRAST - 12/17/2017      INDICATION: Stroke protocol.     TECHNIQUE: CT angiogram of the neck was performed with images acquired  prior to and following intravenous contrast. The images are displayed in  the coronal, oblique and lateral projections (3D).     The radiation dose reduction device was turned on for each scan per the  ALARA (As Low as Reasonably Achievable) protocol.     COMPARISON: None.     FINDINGS: The right common carotid artery is normal. There is a dense  calcified plaque at the origin of the right internal carotid artery.  There are similar findings on the left foot with densely calcified  plaque at the left carotid bifurcation. Both vertebral arteries are  patent.       Impression:       There are calcifications in both carotid bifurcations  rendering precise measurements difficult. However, there does not appear  to be high-grade stenosis on the right or left side involving the  internal carotid arteries. The vertebrobasilar complex is normal.     DICTATED:     12/17/2017  EDITED:          12/17/2017        This report was finalized on 12/17/2017 4:05 PM by Dr. South Gonzalez MD.       CT Angiogram Head With & Without Contrast [[id number]] Collected:  12/17/17 1559     Updated:  12/17/17 1608    Narrative:       EXAMINATION: CT ANGIOGRAM HEAD W WO CONTRAST - 12/17/2017     INDICATION: Stroke protocol.     TECHNIQUE: Intracranial CTA was performed with images acquired in the  AP, lateral and axial projections.     The radiation dose reduction device was turned on for each scan per the  ALARA (As Low as Reasonably Achievable) protocol.     COMPARISON: None.     FINDINGS: The A1 segments of each carotid artery are normal. The  anterior cerebral arteries and their primary and secondary branches are  normal. Both middle cerebral  arteries are patent with no high-grade  stenosis or occlusion. The vertebral basilar complex and posterior  cerebral circulation are normal. There is no intracranial aneurysm.       Impression:       Normal intracranial CTA.     DICTATED:     12/17/2017  EDITED:          12/17/2017                 This report was finalized on 12/17/2017 4:05 PM by Dr. South Gonzalez MD.       XR Chest 1 View [[id number]] Collected:  12/17/17 1655     Updated:  12/17/17 1709    Narrative:          EXAMINATION: XR CHEST 1 VW - 12/17/2017     INDICATION: R53.1-Weakness; R20.2-Paresthesia of skin. Stroke protocol.     COMPARISON: 10/28/2017      FINDINGS: There is a normal cardiac silhouette. There is no acute  inflammatory process, mass or effusion.       Impression:       No active disease.     DICTATED:     12/17/2017  EDITED:          12/17/2017           This report was finalized on 12/17/2017 5:07 PM by Dr. South Gonzalez MD.       CT Head Without Contrast [[id number]] Collected:  12/18/17 1530     Updated:  12/18/17 1637    Narrative:       EXAMINATION: CT HEAD WO CONTRAST-12/18/2017:      INDICATION: Stroke Post tPA Administration - Perform 24 Hours After TPA  Infusion; R53.1-Weakness; R20.2-Paresthesia of skin; Z74.09-Other  reduced mobility; Z74.09-Other reduced mobility.      TECHNIQUE: Axial CT of the head without intravenous contrast  administration.     The radiation dose reduction device was turned on for each scan per the  ALARA (As Low as Reasonably Achievable) protocol.     COMPARISON: MRI of the brain was performed earlier same day.     FINDINGS: Midline structures are symmetric without evidence of mass,  mass effect or midline shift. No intra-axial hemorrhage or extra-axial  fluid collection. Basal cistern is patent. Ventricles and sulci are  mildly prominent towards the vertex. Foramen magnum widely patent.  Globes and orbits unremarkable. The visualized paranasal sinuses and  mastoid air cells are grossly clear.        Impression:       No acute intracranial abnormality. Specifically, no  evidence for intracranial hemorrhage is identified.     D:  12/18/2017  E:  12/18/2017     This report was finalized on 12/18/2017 4:35 PM by Dr. Mendel Rosenberg.       MRI Brain Without Contrast [[id number]] Collected:  12/18/17 1522     Updated:  12/18/17 1637    Narrative:       EXAMINATION: MRI BRAIN WO CONTRAST-12/18/2017:     INDICATION: Rule out stroke, left leg weakness; R53.1-Weakness;  R20.2-Paresthesia of skin; Z74.09-Other reduced mobility; Z74.09-Other  reduced mobility.      TECHNIQUE: Multiplanar MRI of the brain without intravenous contrast.     COMPARISON: CT angiogram head 12/17/2017, CT cerebral perfusion  12/17/2017.     FINDINGS: No restriction on diffusion-weighted sequences. Midline  structures are symmetric without evidence of mass, mass effect or  midline shift. Ventricles and sulci are mildly prominent with noted  prominence towards the vertex consistent with components of atrophy. No  intra-axial hemorrhage or extra-axial fluid collection. Mild-to-moderate  T2/FLAIR hyperintense signal within the periventricular and subcortical  white matter consistent with chronic small vessel ischemic disease.  Pituitary and sella within normal limits. Cervicomedullary junction  widely patent. Globes and orbits retain normal signal characteristics.  The visualized paranasal sinuses and mastoid air cells are grossly clear  and well pneumatized.       Impression:       1.  No acute intracranial abnormality. Specifically, no evidence for  acute ischemia.  2.  Moderate-to-severe chronic small vessel ischemic disease as well as  prominence of the sulci consistent with component of generalized  atrophy.     D:  12/18/2017  E:  12/18/2017            This report was finalized on 12/18/2017 4:35 PM by Dr. Mendel Rosenberg.       XR Chest 1 View [[id number]] Collected:  12/19/17 1119     Updated:  12/19/17 1341    Narrative:       EXAMINATION: XR CHEST  1 VW-      INDICATION: Rule out Infiltrate; R53.1-Weakness; R20.2-Paresthesia of  skin; Z74.09-Other reduced mobility.     COMPARISON: 12/17/2017.     FINDINGS: The cardiac silhouette is normal. There are prominent  bronchovascular markings thought to be related to a poor inspiratory  effort. There has been little change when compared to 12/17/2017.           Impression:       Poor inspiratory effort; no active disease.     D:  12/19/2017  E:  12/19/2017     This report was finalized on 12/19/2017 1:39 PM by Dr. South Gonzalez MD.             Results for orders placed during the hospital encounter of 12/17/17   Adult Transthoracic Echo Complete W/ Cont if Necessary Per Protocol (With Agitated Saline)    Narrative · Left ventricular systolic function is normal.  · Left ventricular diastolic dysfunction (grade I a) consistent with   impaired relaxation.  · Left ventricular wall thickness is consistent with mild concentric   hypertrophy.            Discharge Details      MehdiDenzel   Home Medication Instructions MARGARITA:[id number]    Printed on:12/22/17 5448   Medication Information                      albuterol (PROVENTIL HFA;VENTOLIN HFA) 108 (90 BASE) MCG/ACT inhaler  Inhale 2 puffs Every 4 (Four) Hours As Needed. ProAir  (90 Base) MCG/ACT Inhalation Aerosol Solution; Patient Sig: ProAir  (90 Base) MCG/ACT Inhalation Aerosol Solution ; 8; 0; 05-Oct-2015; Active             amLODIPine (NORVASC) 5 MG tablet  Take 1 tablet by mouth Daily.             aspirin 325 MG tablet  Take 1 tablet by mouth Daily.             atorvastatin (LIPITOR) 80 MG tablet  Take 1 tablet by mouth Every Night.             cefuroxime (CEFTIN) 500 MG tablet  Take 1 tablet by mouth Every 12 (Twelve) Hours for 7 doses. Indications: Upper Respiratory Tract Infection             Cetirizine HCl 10 MG capsule  Take 1 capsule by mouth daily.             colchicine 0.6 MG tablet  Take 1 tablet by mouth Daily.             famotidine  (PEPCID) 20 MG tablet  Take 1 tablet by mouth Every Night.             furosemide (LASIX) 40 MG tablet  Take 40 mg by mouth Daily.             gabapentin (NEURONTIN) 300 MG capsule  Take 1 capsule by mouth 2 (Two) Times a Day.             HUMALOG KWIKPEN 100 UNIT/ML solution pen-injector  Inject 10 Units under the skin 3 (Three) Times a Day With Meals. Follows [facility] From PCP             hydrOXYzine (ATARAX) 25 MG tablet  Take 2 tablets by mouth Every 6 (Six) Hours As Needed for Itching.             lisinopril (PRINIVIL,ZESTRIL) 40 MG tablet  Take 1 tablet by mouth daily.             metoprolol tartrate (LOPRESSOR) 50 MG tablet  Take 1 tablet by mouth Every 12 (Twelve) Hours.             Misc Natural Products (TUMERSAID) tablet  Take 1 tablet by mouth Daily.             montelukast (SINGULAIR) 10 MG tablet  take 1 tablet by mouth at bedtime             omeprazole (PriLOSEC) 20 MG capsule  Take 1 capsule by mouth daily.             potassium chloride (K-DUR,KLOR-CON) 20 MEQ CR tablet  One tablet on Sundays, Tuesdays, and Thursdays             ranolazine (RANEXA) 500 MG 12 hr tablet  Take 1 tablet by mouth 2 (Two) Times a Day.             topiramate (TOPAMAX) 100 MG tablet  Take 100 mg by mouth Daily.             TOUJEO SOLOSTAR 300 UNIT/ML solution pen-injector  Inject 60 Units as directed Daily.             Vortioxetine HBr (TRINTELLIX) 10 MG tablet  Take 10 mg by mouth Daily.               Discharge Disposition:  Home or Self Care    Discharge Diet:  Diet Instructions     Diet: Regular, Consistent Carbohydrate, Cardiac       Discharge Diet:   Regular  Consistent Carbohydrate  Cardiac                  Discharge Activity:   Activity Instructions     Activity as Tolerated                   Special Instructions:    Future Appointments  Date Time Provider Department Center   1/17/2018 11:45 AM London Jiménez III, MD [facility] None       Additional Instructions for the Follow-ups that You Need to Schedule     Discharge  Follow-up with PCP    As directed    Follow Up Details:  within 1 week for hospital follow up           Discharge Follow-up with Specialty: Stroke clinic    As directed    Specialty:  Stroke clinic    Follow Up Details:  first available                     Time Spent on Discharge:  40 minutes    [place], ANTONIO  12/22/17  1:37 PM

## 2017-12-25 ENCOUNTER — APPOINTMENT (OUTPATIENT)
Dept: CARDIAC REHAB | Facility: HOSPITAL | Age: 57
End: 2017-12-25

## 2017-12-26 NOTE — PAYOR COMM NOTE
"Denzel Hassan (57 y.o. Male)   Ref # [id number]  Esperanza Hicks RN, bSN  Phone # [phone]  Fax # [phone]    Date of Birth Social Security Number Address Home Phone MRN    1960  [address] [address] [phone] [id number]    Yazdanism Marital Status          None        Admission Date Admission Type Admitting Provider Attending Provider Department, Room/Bed    12/17/17 Emergency Payton Ren DO  [facility] 3F, S313/1    Discharge Date Discharge Disposition Discharge Destination        12/22/2017 Home or Self Care             Attending Provider: (none)    Allergies:  Sulfa Antibiotics, Codeine, Invokana [Canagliflozin], Morphine    Isolation:  None   Infection:  None   Code Status:  Prior    Ht:  167.6 cm (66\")   Wt:  116 kg (256 lb 4.8 oz)    Admission Cmt:  None   Principal Problem:  CVA (S/P tPA) [I63.9]                 Active Insurance as of 12/17/2017     Primary Coverage     Payor Plan Insurance Group Employer/Plan Group    ANTH MEDICARE REPLACEMENT ANTHEM MEDICARE ADVANTAGE [id number]     Payor Plan Address Payor Plan Phone Number Effective From Effective To    [address] [phone] 1/1/2016     [place] [address]       Subscriber Name Subscriber Birth Date Member ID       DENZEL HASSAN 1960 [id number]                 Emergency Contacts      (Rel.) Home Phone Work Phone Mobile Phone    Carol Hassan (Spouse) [phone] -- --               Discharge Summary      ANTONIO Hernandez at 12/22/2017 10:37 AM     Attestation signed by Payton Ren DO at 12/22/2017  2:20 PM        Attending Dawn     I supervised care of the patient on day of discharge with direct care provided by the advanced care provider (APC).    Payton Ren DO  12/22/17  2:20 PM                                       [facility] Medicine Services  DISCHARGE SUMMARY    Patient Name: Denzel" Mehdi  : 1960  MRN: [id number]    Date of Admission: 2017  Date of Discharge:  2017  Primary Care Physician: Ottoniel Toledo MD    Consults     Date and Time Order Name Status Description    2017 1413 Consult Interventional Neurologist and/or Stroke Team Completed         Hospital Course     Presenting Problem:   Left-sided weakness [R53.1]    Active Hospital Problems (** Indicates Principal Problem)    Diagnosis Date Noted   • **CVA (S/P tPA) [I63.9] 2017   • Acute bronchitis [J20.9] 2017   • History of CVA (cerebrovascular accident) [Z86.73] 08/15/2017   • Essential hypertension [I10] 2017   • Diabetes mellitus [E11.9] 2017   • GERD (gastroesophageal reflux disease) [K21.9] 2017   • Hypercholesterolemia [E78.00] 2016   • Coronary artery disease involving native coronary artery of native heart with unstable angina pectoris [I25.110] 2016      Resolved Hospital Problems    Diagnosis Date Noted Date Resolved   • Left-sided weakness [R53.1] 2017      Hospital Course:  Denzel Harding is a 57 y.o. male with past medical history significant for CAD status post stenting and more recently CABG in August, diabetes, hypertension, hyperlipidemia and reported stroke about 3 years ago.  He presented to [facility] ED with complaints of left facial droop, left-sided weakness, and slurred speech.  CT head, CTA head/neck and CT surgery will perfusion were normal.  He was given TPA at 1420 and it was stopped at 1525 on 17.  He was admitted to the ICU per TPA protocol.  MRI was negative.  On 19 he developed some worsening shortness of breath and yellow sputum production.  He was started on empiric antibiotics and has continued to require intermittently.  He was transferred to the floor on 17 the hospital medicine service.  Neurology has followed during hospitalization and recommend continuing high intensity statin and aspirin 325 mg.  On  day of discharge, patient's room air saturations are 94-96 percent.  He ambulated in the cox and the lowest his oxygen saturation dropped to was 89%.  Patient has improved clinically and is ready to be discharge home. Discharge plans and instructions were reviewed with him and he verbalized an understanding.  Patient was given specific instructions on how to continue home insulin regimen.  He will monitor glucose level and provide information to his PCP at his follow up appointment for further adjustments.  Patient is to follow up with his PCP within 1 week for hospital follow up.          Day of Discharge     HPI:   Patient is ambulating in room.  Does have dyspnea on exertion but saturations stay above 89% on room air.  Patient denies chest pain or abdominal pain.  States he feels better and is ready for home today.    Review of Systems  Gen- No fevers, chills  CV- No chest pain, palpitations  Resp- No cough, mild dyspnea with exertion   GI- No N/V/D, abd pain    Otherwise ROS is negative except as mentioned in the HPI.    Vital Signs:   Temp:  [97.4 °F (36.3 °C)-99.6 °F (37.6 °C)] 97.8 °F (36.6 °C)  Heart Rate:  [65-84] 71  Resp:  [16-18] 18  BP: (101-136)/(60-85) 109/69     Physical Exam:  Constitutional: No acute distress, awake, alert  Eyes: PERRLA, sclerae anicteric, no conjunctival injection  HENT: NCAT, mucous membranes moist  Neck: Supple, no thyromegaly, no lymphadenopathy, trachea midline  Respiratory: Clear to auscultation bilaterally, nonlabored respirations   Cardiovascular: RRR, no murmurs, rubs, or gallops, palpable pedal pulses bilaterally  Gastrointestinal: Positive bowel sounds, soft, nontender, nondistended  Musculoskeletal: No bilateral ankle edema, no clubbing or cyanosis to extremities  Psychiatric: Appropriate affect, cooperative  Neurologic: Oriented x 3, strength symmetric in all extremities, Cranial Nerves grossly intact to confrontation, speech clear  Skin: No rashes    Pertinent   and/or Most Recent Results       Results from last 7 days  Lab Units 12/18/17  0359 12/17/17  1417 12/17/17  1416   WBC 10*3/mm3 7.33 8.07  --    HEMOGLOBIN g/dL 13.1 13.9  --    HEMOGLOBIN, POC g/dL  --   --  14.3   HEMATOCRIT % 39.8 41.3  --    HEMATOCRIT POC %  --   --  42   PLATELETS 10*3/mm3 171 183  --    SODIUM mmol/L 137  --   --    POTASSIUM mmol/L 3.8  --   --    CHLORIDE mmol/L 105  --   --    CO2 mmol/L 24.0  --   --    BUN mg/dL 13  --   --    CREATININE mg/dL 0.80  --  1.00   GLUCOSE mg/dL 214*  --   --    CALCIUM mg/dL 8.8  --   --        Results from last 7 days  Lab Units 12/18/17  0359 12/17/17  1417 12/17/17  1415   BILIRUBIN mg/dL 0.5  --   --    ALK PHOS U/L 83  --   --    ALT (SGPT) U/L 25 30  --    AST (SGOT) U/L 16 22  --    PROTIME seconds  --   --  13.1   INR   --   --  1.1   APTT seconds  --  25.4  --        Results from last 7 days  Lab Units 12/18/17  0359   CHOLESTEROL mg/dL 112   TRIGLYCERIDES mg/dL 127   HDL CHOL mg/dL 28*   LDL CHOL mg/dL 75       Results from last 7 days  Lab Units 12/18/17  0359   HEMOGLOBIN A1C % 10.20*     Brief Urine Lab Results  (Last result in the past 365 days)      Color   Clarity   Blood   Leuk Est   Nitrite   Protein   CREAT   Urine HCG        10/28/17 1616 Yellow Clear Negative Negative Negative Negative               Microbiology Results Abnormal     Procedure Component Value - Date/Time    Respiratory Culture - Sputum, ET Suction [[id number]] Collected:  12/19/17 1341    Lab Status:  Final result Specimen:  Sputum from ET Suction Updated:  12/21/17 0785     Respiratory Culture --      Light growth (2+) Normal Respiratory Ana M     Gram Stain Result Many (4+) WBCs per low power field      Few (2+) Gram positive bacilli    Influenza A & B, RT PCR - Swab, Nasopharynx [[id number]]  (Normal) Collected:  12/20/17 1008    Lab Status:  Final result Specimen:  Swab from Nasopharynx Updated:  12/20/17 1120     Influenza A PCR Not Detected     Influenza B PCR Not  Detected          Imaging Results (all)     Procedure Component Value Units Date/Time    CT Head Without Contrast [[id number]] Collected:  12/17/17 1424     Updated:  12/17/17 1428    Narrative:       EXAMINATION: CT HEAD WO CONTRAST - 12/17/2017     INDICATION: Code 19.      TECHNIQUE: CT scan of the head was performed at 5 mm intervals. No  intravenous contrast was utilized.     The radiation dose reduction device was turned on for each scan per the  ALARA (As Low as Reasonably Achievable) protocol.     COMPARISON: MR of the brain dated 4/11/2017 (no acute findings).     FINDINGS: There is no intracranial mass. There is no hemorrhage. There  is no midline shift or extra-axial fluid collection.       Impression:       Normal unenhanced CT scan of the head.     TIME OF  EXAM:  1353 HOURS  REPORT TO ER:  1408 HOURS     DICTATED:     12/17/2017  EDITED:          12/17/2017        This report was finalized on 12/17/2017 2:26 PM by Dr. South Gonzalez MD.       CT Cerebral Perfusion With & Without Contrast [[id number]] Collected:  12/17/17 1603     Updated:  12/17/17 1607    Narrative:       EXAMINATION: CT CEREBRAL PERFUSION W WO CONTRAST - 12/17/2017     INDICATION: Stroke protocol.     TECHNIQUE: CT perfusion imaging was performed with data acquisition  regarding blood volume, blood flow, mean transit time and T-1/2.     The radiation dose reduction device was turned on for each scan per the  ALARA (As Low as Reasonably Achievable) protocol.     COMPARISON: None.     FINDINGS: The datasets indicate no significant blood flow abnormality or  asymmetry. Similarly, cerebral blood volume is symmetrical and uniform  with no mismatched defect.       Impression:       Normal CT perfusion imaging.     DICTATED:     12/17/2017  EDITED:          12/17/2017        This report was finalized on 12/17/2017 4:05 PM by Dr. South Gonzalez MD.       CT Angiogram Neck With & Without Contrast [[id number]] Collected:  12/17/17 1602     Updated:   12/17/17 1608    Narrative:       EXAMINATION: CT ANGIOGRAM NECK W WO CONTRAST - 12/17/2017      INDICATION: Stroke protocol.     TECHNIQUE: CT angiogram of the neck was performed with images acquired  prior to and following intravenous contrast. The images are displayed in  the coronal, oblique and lateral projections (3D).     The radiation dose reduction device was turned on for each scan per the  ALARA (As Low as Reasonably Achievable) protocol.     COMPARISON: None.     FINDINGS: The right common carotid artery is normal. There is a dense  calcified plaque at the origin of the right internal carotid artery.  There are similar findings on the left foot with densely calcified  plaque at the left carotid bifurcation. Both vertebral arteries are  patent.       Impression:       There are calcifications in both carotid bifurcations  rendering precise measurements difficult. However, there does not appear  to be high-grade stenosis on the right or left side involving the  internal carotid arteries. The vertebrobasilar complex is normal.     DICTATED:     12/17/2017  EDITED:          12/17/2017        This report was finalized on 12/17/2017 4:05 PM by Dr. South Gonzalez MD.       CT Angiogram Head With & Without Contrast [[id number]] Collected:  12/17/17 1559     Updated:  12/17/17 1608    Narrative:       EXAMINATION: CT ANGIOGRAM HEAD W WO CONTRAST - 12/17/2017     INDICATION: Stroke protocol.     TECHNIQUE: Intracranial CTA was performed with images acquired in the  AP, lateral and axial projections.     The radiation dose reduction device was turned on for each scan per the  ALARA (As Low as Reasonably Achievable) protocol.     COMPARISON: None.     FINDINGS: The A1 segments of each carotid artery are normal. The  anterior cerebral arteries and their primary and secondary branches are  normal. Both middle cerebral arteries are patent with no high-grade  stenosis or occlusion. The vertebral basilar complex and  posterior  cerebral circulation are normal. There is no intracranial aneurysm.       Impression:       Normal intracranial CTA.     DICTATED:     12/17/2017  EDITED:          12/17/2017                 This report was finalized on 12/17/2017 4:05 PM by Dr. South Gonzalez MD.       XR Chest 1 View [[id number]] Collected:  12/17/17 1655     Updated:  12/17/17 1709    Narrative:          EXAMINATION: XR CHEST 1 VW - 12/17/2017     INDICATION: R53.1-Weakness; R20.2-Paresthesia of skin. Stroke protocol.     COMPARISON: 10/28/2017      FINDINGS: There is a normal cardiac silhouette. There is no acute  inflammatory process, mass or effusion.       Impression:       No active disease.     DICTATED:     12/17/2017  EDITED:          12/17/2017           This report was finalized on 12/17/2017 5:07 PM by Dr. South Gonzalez MD.       CT Head Without Contrast [[id number]] Collected:  12/18/17 1530     Updated:  12/18/17 1637    Narrative:       EXAMINATION: CT HEAD WO CONTRAST-12/18/2017:      INDICATION: Stroke Post tPA Administration - Perform 24 Hours After TPA  Infusion; R53.1-Weakness; R20.2-Paresthesia of skin; Z74.09-Other  reduced mobility; Z74.09-Other reduced mobility.      TECHNIQUE: Axial CT of the head without intravenous contrast  administration.     The radiation dose reduction device was turned on for each scan per the  ALARA (As Low as Reasonably Achievable) protocol.     COMPARISON: MRI of the brain was performed earlier same day.     FINDINGS: Midline structures are symmetric without evidence of mass,  mass effect or midline shift. No intra-axial hemorrhage or extra-axial  fluid collection. Basal cistern is patent. Ventricles and sulci are  mildly prominent towards the vertex. Foramen magnum widely patent.  Globes and orbits unremarkable. The visualized paranasal sinuses and  mastoid air cells are grossly clear.       Impression:       No acute intracranial abnormality. Specifically, no  evidence for intracranial  hemorrhage is identified.     D:  12/18/2017  E:  12/18/2017     This report was finalized on 12/18/2017 4:35 PM by Dr. Mendel Rosenberg.       MRI Brain Without Contrast [[id number]] Collected:  12/18/17 1522     Updated:  12/18/17 1637    Narrative:       EXAMINATION: MRI BRAIN WO CONTRAST-12/18/2017:     INDICATION: Rule out stroke, left leg weakness; R53.1-Weakness;  R20.2-Paresthesia of skin; Z74.09-Other reduced mobility; Z74.09-Other  reduced mobility.      TECHNIQUE: Multiplanar MRI of the brain without intravenous contrast.     COMPARISON: CT angiogram head 12/17/2017, CT cerebral perfusion  12/17/2017.     FINDINGS: No restriction on diffusion-weighted sequences. Midline  structures are symmetric without evidence of mass, mass effect or  midline shift. Ventricles and sulci are mildly prominent with noted  prominence towards the vertex consistent with components of atrophy. No  intra-axial hemorrhage or extra-axial fluid collection. Mild-to-moderate  T2/FLAIR hyperintense signal within the periventricular and subcortical  white matter consistent with chronic small vessel ischemic disease.  Pituitary and sella within normal limits. Cervicomedullary junction  widely patent. Globes and orbits retain normal signal characteristics.  The visualized paranasal sinuses and mastoid air cells are grossly clear  and well pneumatized.       Impression:       1.  No acute intracranial abnormality. Specifically, no evidence for  acute ischemia.  2.  Moderate-to-severe chronic small vessel ischemic disease as well as  prominence of the sulci consistent with component of generalized  atrophy.     D:  12/18/2017  E:  12/18/2017            This report was finalized on 12/18/2017 4:35 PM by Dr. Mendel Rosenberg.       XR Chest 1 View [[id number]] Collected:  12/19/17 1119     Updated:  12/19/17 1341    Narrative:       EXAMINATION: XR CHEST 1 VW-      INDICATION: Rule out Infiltrate; R53.1-Weakness; R20.2-Paresthesia of  skin;  Z74.09-Other reduced mobility.     COMPARISON: 12/17/2017.     FINDINGS: The cardiac silhouette is normal. There are prominent  bronchovascular markings thought to be related to a poor inspiratory  effort. There has been little change when compared to 12/17/2017.           Impression:       Poor inspiratory effort; no active disease.     D:  12/19/2017  E:  12/19/2017     This report was finalized on 12/19/2017 1:39 PM by Dr. South Gonzalez MD.             Results for orders placed during the hospital encounter of 12/17/17   Adult Transthoracic Echo Complete W/ Cont if Necessary Per Protocol (With Agitated Saline)    Narrative · Left ventricular systolic function is normal.  · Left ventricular diastolic dysfunction (grade I a) consistent with   impaired relaxation.  · Left ventricular wall thickness is consistent with mild concentric   hypertrophy.            Discharge Details      Denzel Harding   Home Medication Instructions MARGARITA:[id number]    Printed on:12/22/17 7847   Medication Information                      albuterol (PROVENTIL HFA;VENTOLIN HFA) 108 (90 BASE) MCG/ACT inhaler  Inhale 2 puffs Every 4 (Four) Hours As Needed. ProAir  (90 Base) MCG/ACT Inhalation Aerosol Solution; Patient Sig: ProAir  (90 Base) MCG/ACT Inhalation Aerosol Solution ; 8; 0; 05-Oct-2015; Active             amLODIPine (NORVASC) 5 MG tablet  Take 1 tablet by mouth Daily.             aspirin 325 MG tablet  Take 1 tablet by mouth Daily.             atorvastatin (LIPITOR) 80 MG tablet  Take 1 tablet by mouth Every Night.             cefuroxime (CEFTIN) 500 MG tablet  Take 1 tablet by mouth Every 12 (Twelve) Hours for 7 doses. Indications: Upper Respiratory Tract Infection             Cetirizine HCl 10 MG capsule  Take 1 capsule by mouth daily.             colchicine 0.6 MG tablet  Take 1 tablet by mouth Daily.             famotidine (PEPCID) 20 MG tablet  Take 1 tablet by mouth Every Night.             furosemide (LASIX) 40  MG tablet  Take 40 mg by mouth Daily.             gabapentin (NEURONTIN) 300 MG capsule  Take 1 capsule by mouth 2 (Two) Times a Day.             HUMALOG KWIKPEN 100 UNIT/ML solution pen-injector  Inject 10 Units under the skin 3 (Three) Times a Day With Meals. Follows [facility] From PCP             hydrOXYzine (ATARAX) 25 MG tablet  Take 2 tablets by mouth Every 6 (Six) Hours As Needed for Itching.             lisinopril (PRINIVIL,ZESTRIL) 40 MG tablet  Take 1 tablet by mouth daily.             metoprolol tartrate (LOPRESSOR) 50 MG tablet  Take 1 tablet by mouth Every 12 (Twelve) Hours.             Misc Natural Products (TUMERSAID) tablet  Take 1 tablet by mouth Daily.             montelukast (SINGULAIR) 10 MG tablet  take 1 tablet by mouth at bedtime             omeprazole (PriLOSEC) 20 MG capsule  Take 1 capsule by mouth daily.             potassium chloride (K-DUR,KLOR-CON) 20 MEQ CR tablet  One tablet on Sundays, Tuesdays, and Thursdays             ranolazine (RANEXA) 500 MG 12 hr tablet  Take 1 tablet by mouth 2 (Two) Times a Day.             topiramate (TOPAMAX) 100 MG tablet  Take 100 mg by mouth Daily.             TOUJEO SOLOSTAR 300 UNIT/ML solution pen-injector  Inject 60 Units as directed Daily.             Vortioxetine HBr (TRINTELLIX) 10 MG tablet  Take 10 mg by mouth Daily.               Discharge Disposition:  Home or Self Care    Discharge Diet:  Diet Instructions     Diet: Regular, Consistent Carbohydrate, Cardiac       Discharge Diet:   Regular  Consistent Carbohydrate  Cardiac                  Discharge Activity:   Activity Instructions     Activity as Tolerated                   Special Instructions:    Future Appointments  Date Time Provider Department Center   1/17/2018 11:45 AM London Jiménez III, MD [facility] None       Additional Instructions for the Follow-ups that You Need to Schedule     Discharge Follow-up with PCP    As directed    Follow Up Details:  within 1 week for hospital follow up            Discharge Follow-up with Specialty: Stroke clinic    As directed    Specialty:  Stroke clinic    Follow Up Details:  first available                     Time Spent on Discharge:  40 minutes    ANTONIO Hernandez  12/22/17  1:37 PM       Electronically signed by Payton Ren DO at 12/22/2017  2:20 PM        Discharge Order     Start     Ordered    12/22/17 1123  Discharge patient  Once     Expected Discharge Date:  12/22/17    Discharge Disposition:  Home or Self Care        12/22/17 1123

## 2017-12-27 ENCOUNTER — APPOINTMENT (OUTPATIENT)
Dept: CARDIAC REHAB | Facility: HOSPITAL | Age: 57
End: 2017-12-27

## 2017-12-29 ENCOUNTER — APPOINTMENT (OUTPATIENT)
Dept: CARDIAC REHAB | Facility: HOSPITAL | Age: 57
End: 2017-12-29

## 2018-01-04 ENCOUNTER — TELEPHONE (OUTPATIENT)
Dept: NEUROSURGERY | Facility: CLINIC | Age: 58
End: 2018-01-04

## 2018-01-04 ENCOUNTER — OFFICE VISIT (OUTPATIENT)
Dept: NEUROSURGERY | Facility: CLINIC | Age: 58
End: 2018-01-04

## 2018-01-04 VITALS
BODY MASS INDEX: 43.68 KG/M2 | SYSTOLIC BLOOD PRESSURE: 122 MMHG | DIASTOLIC BLOOD PRESSURE: 75 MMHG | HEART RATE: 80 BPM | WEIGHT: 271.8 LBS | TEMPERATURE: 98.3 F | HEIGHT: 66 IN

## 2018-01-04 DIAGNOSIS — I63.311 CEREBROVASCULAR ACCIDENT (CVA) DUE TO THROMBOSIS OF RIGHT MIDDLE CEREBRAL ARTERY (HCC): Primary | ICD-10-CM

## 2018-01-04 PROCEDURE — 99214 OFFICE O/P EST MOD 30 MIN: CPT | Performed by: NURSE PRACTITIONER

## 2018-01-04 NOTE — TELEPHONE ENCOUNTER
Mary found the model type of his glucose meter in [employer] and entered order for test strips, closing encounter

## 2018-01-04 NOTE — TELEPHONE ENCOUNTER
Provider:  Mary  Caller: Denzel Harding  Time of call:  04:30 PM  Phone #:  [phone]  Last visit:  01/04/18   Next visit: none scheduled     Reason for call:       Pt left  stating that he was told to call us back to tell us 'what kind of meter he had so we could call in strips' he said he had a 1 touch meter.     I spoke to Mary and she said that she had told the patient we needed to know the specific brand/model number of his 1 touch meter so that we could call in test strips for him.     Called pt back to get more details, went straight to , left requesting that he call us back and tell us the model/brand of his 1 touch meter.     Message to be routed to Mary after we get this information

## 2018-01-04 NOTE — PROGRESS NOTES
NAME: MIKY HASSAN   DOS: 2018  : 1960  PCP: Ottoniel Toledo MD    Chief Complaint:    Chief Complaint   Patient presents with   • Stroke       History of Present Illness:  57 y.o. male male with past medical history significant for CAD status post stenting and more recently CABG in August, diabetes, hypertension, hyperlipidemia and reported stroke about 3 years ago.  He presented to [facility] ED on 17 with complaints of left facial droop, left-sided weakness, and slurred speech.  CT head, CTA head/neck and CT surgery will perfusion were normal.  He was given TPA at 1420 and it was stopped at 1525 on 17.  He was admitted to the ICU per TPA protocol.  MRI was negative.  On 19 he developed some worsening shortness of breath and yellow sputum production.  He was started on empiric antibiotics and has continued to require intermittently.  He was transferred to the floor on 17 the hospital medicine service.  Neurology has followed during hospitalization and recommend continuing high intensity statin and aspirin 325 mg.  On day of discharge, patient's room air saturations are 94-96 percent.  He ambulated in the cox and the lowest his oxygen saturation dropped to was 89%.  Patient was discharged home and is here today for follow up.       Past Medical History:  Past Medical History:   Diagnosis Date   • Anxiety    • Arthritis    • Asthma    • BiPAP (biphasic positive airway pressure) dependence    • Brachial neuritis 2017   • Chest pain    • COPD (chronic obstructive pulmonary disease)    • Depression    • Diabetes mellitus    • Dizziness    • Edema    • GERD (gastroesophageal reflux disease)    • H/O chest x-ray 2015    Mild Left base atelectasis   • H/O echocardiogram 2015    Normal LVSF. EF of 60-65%. Grade 1 diastolic dysfunction of the LV myocardium. No evidence of pericardial effusion   • H/O exercise stress test    • History of herniated intervertebral disc      History of left L5-S1 disc herniation   • LOM (loss of memory) 1/19/2017   • Lower back pain     Right   • Measles    • Obstructive sleep apnea    • Palpitations    • Pericardial effusion    • Seizure disorder    • Shortness of breath 1/19/2017   • SOB (shortness of breath)    • Stroke    • Wears glasses        Past Surgical History:  Past Surgical History:   Procedure Laterality Date   • BACK SURGERY     • CARDIAC CATHETERIZATION     • CARDIAC CATHETERIZATION N/A 8/11/2017    Procedure: Coronary angiography;  Surgeon: Dallas Kim MD;  Location: [facility] CATH INVASIVE LOCATION;  Service:    • CARDIAC CATHETERIZATION N/A 8/11/2017    Procedure: Left Heart Cath;  Surgeon: Dallas Kim MD;  Location: [facility] CATH INVASIVE LOCATION;  Service:    • CARDIAC CATHETERIZATION N/A 8/11/2017    Procedure: Left ventriculography;  Surgeon: Dallas Kim MD;  Location: [facility] CATH INVASIVE LOCATION;  Service:    • CARDIOVASCULAR STRESS TEST  07/03/2017    WITH DR KIM AT [facility]   • CARPAL TUNNEL RELEASE Right    • CATARACT EXTRACTION W/ INTRAOCULAR LENS IMPLANT Right 6/12/2017    Procedure: CATARACT PHACO EXTRACTION WITH INTRAOCULAR LENS IMPLANT RIGHT ;  Surgeon: Natalie Cao MD;  Location: [facility] OR;  Service:    • CATARACT EXTRACTION W/ INTRAOCULAR LENS IMPLANT Left 7/10/2017    Procedure: CATARACT PHACO EXTRACTION WITH INTRAOCULAR LENS IMPLANT LEFT;  Surgeon: Natalie Cao MD;  Location: [facility] OR;  Service:    • CHOLECYSTECTOMY     • COLONOSCOPY     • CORONARY ANGIOPLASTY WITH STENT PLACEMENT     • CORONARY ANGIOPLASTY WITH STENT PLACEMENT      X1-LAD   • CORONARY ARTERY BYPASS GRAFT N/A 8/15/2017    Procedure: CORONARY ARTERY BYPASS GRAFTING x 3 UTILIZING THE LEFT INTERNAL MAMMARY ARTERY WITH ENDOSCOPIC VEIN HARVESTING OF THE RIGHT GREATER SAPHENOUS VEIN, HAMLET, LAD ENDARECTOMY;  Surgeon: London Damon MD;  Location: [facility] OR;  Service:    • ENDOSCOPY     • EYE SURGERY      cataract surgery both eyes   • KNEE  ARTHROSCOPY Bilateral    • NEUROPLASTY / TRANSPOSITION ULNAR NERVE AT ELBOW     • OTHER SURGICAL HISTORY      Foraminotomy and discectomy   • PERICARDIAL WINDOW N/A 8/25/2017    Procedure: PERICARDIAL WINDOW;  Surgeon: Freeman Phillips MD;  Location: [facility] OR;  Service:        Review of Systems:        Review of Systems   Constitutional: Positive for fatigue. Negative for activity change, appetite change, chills, diaphoresis, fever and unexpected weight change.   HENT: Positive for congestion, ear pain, rhinorrhea, sneezing and tinnitus. Negative for dental problem, drooling, ear discharge, facial swelling, hearing loss, mouth sores, nosebleeds, postnasal drip, sinus pressure, sore throat, trouble swallowing and voice change.    Eyes: Negative for photophobia, pain, discharge, redness, itching and visual disturbance.   Respiratory: Positive for apnea and shortness of breath. Negative for cough, choking, chest tightness, wheezing and stridor.    Cardiovascular: Positive for leg swelling. Negative for chest pain and palpitations.   Gastrointestinal: Negative for abdominal distention, abdominal pain, anal bleeding, blood in stool, constipation, diarrhea, nausea, rectal pain and vomiting.   Endocrine: Positive for cold intolerance. Negative for heat intolerance, polydipsia, polyphagia and polyuria.   Genitourinary: Negative for decreased urine volume, difficulty urinating, dysuria, enuresis, flank pain, frequency, genital sores, hematuria and urgency.   Musculoskeletal: Negative for arthralgias, back pain, gait problem, joint swelling, myalgias, neck pain and neck stiffness.   Skin: Negative for color change, pallor, rash and wound.   Allergic/Immunologic: Negative for environmental allergies, food allergies and immunocompromised state.   Neurological: Positive for dizziness, weakness, light-headedness and numbness. Negative for tremors, seizures, syncope, facial asymmetry, speech difficulty and headaches.    Hematological: Negative for adenopathy. Does not bruise/bleed easily.   Psychiatric/Behavioral: Positive for dysphoric mood. Negative for agitation, behavioral problems, confusion, decreased concentration, hallucinations, self-injury, sleep disturbance and suicidal ideas. The patient is not nervous/anxious and is not hyperactive.         Medications    Current Outpatient Prescriptions:   •  albuterol (PROVENTIL HFA;VENTOLIN HFA) 108 (90 BASE) MCG/ACT inhaler, Inhale 2 puffs Every 4 (Four) Hours As Needed. ProAir  (90 Base) MCG/ACT Inhalation Aerosol Solution; Patient Sig: ProAir  (90 Base) MCG/ACT Inhalation Aerosol Solution ; 8; 0; 05-Oct-2015; Active, Disp: , Rfl:   •  amLODIPine (NORVASC) 5 MG tablet, Take 1 tablet by mouth Daily., Disp: 90 tablet, Rfl: 1  •  aspirin 325 MG tablet, Take 1 tablet by mouth Daily., Disp: , Rfl:   •  atorvastatin (LIPITOR) 80 MG tablet, Take 1 tablet by mouth Every Night., Disp: 30 tablet, Rfl: 0  •  Cetirizine HCl 10 MG capsule, Take 1 capsule by mouth daily., Disp: , Rfl:   •  colchicine 0.6 MG tablet, Take 1 tablet by mouth Daily., Disp: 30 tablet, Rfl: 1  •  famotidine (PEPCID) 20 MG tablet, Take 1 tablet by mouth Every Night., Disp: 30 tablet, Rfl: 0  •  furosemide (LASIX) 40 MG tablet, Take 40 mg by mouth Daily., Disp: , Rfl:   •  gabapentin (NEURONTIN) 300 MG capsule, Take 1 capsule by mouth 2 (Two) Times a Day., Disp: 60 capsule, Rfl: 5  •  HUMALOG KWIKPEN 100 UNIT/ML solution pen-injector, Inject 10 Units under the skin 3 (Three) Times a Day With Meals. Follows SSI From PCP, Disp: , Rfl: 0  •  hydrOXYzine (ATARAX) 25 MG tablet, Take 2 tablets by mouth Every 6 (Six) Hours As Needed for Itching., Disp: 60 tablet, Rfl: 0  •  lisinopril (PRINIVIL,ZESTRIL) 40 MG tablet, Take 1 tablet by mouth daily., Disp: , Rfl:   •  metoprolol tartrate (LOPRESSOR) 50 MG tablet, Take 1 tablet by mouth Every 12 (Twelve) Hours., Disp: 180 tablet, Rfl: 1  •  Misc Natural Products  (TUMERSAID) tablet, Take 1 tablet by mouth Daily., Disp: , Rfl:   •  montelukast (SINGULAIR) 10 MG tablet, take 1 tablet by mouth at bedtime, Disp: 30 tablet, Rfl: 0  •  omeprazole (PriLOSEC) 20 MG capsule, Take 1 capsule by mouth daily., Disp: , Rfl:   •  potassium chloride (K-DUR,KLOR-CON) 20 MEQ CR tablet, One tablet on Sundays, Tuesdays, and Thursdays (Patient taking differently: Take 20 mEq by mouth Daily. Daily with lasix), Disp: 15 tablet, Rfl: 1  •  ranolazine (RANEXA) 500 MG 12 hr tablet, Take 1 tablet by mouth 2 (Two) Times a Day., Disp: 60 tablet, Rfl: 11  •  topiramate (TOPAMAX) 100 MG tablet, Take 100 mg by mouth Daily., Disp: , Rfl:   •  TOUJEO SOLOSTAR 300 UNIT/ML solution pen-injector, Inject 60 Units as directed Daily., Disp: , Rfl: 0  •  Vortioxetine HBr (TRINTELLIX) 10 MG tablet, Take 10 mg by mouth Daily., Disp: , Rfl:     Allergies:  Allergies   Allergen Reactions   • Sulfa Antibiotics Anaphylaxis, Nausea And Vomiting and Delirium   • Codeine Nausea And Vomiting     Can only take with Phenergan   • Invokana [Canagliflozin]      DKA   • Morphine      Does not work. It causes pain       Social Hx:  Social History   Substance Use Topics   • Smoking status: Former Smoker     Packs/day: 1.00     Years: 10.00     Types: Cigarettes     Quit date: 1/1/1998   • Smokeless tobacco: Former User     Types: Snuff     Quit date: 5/24/2017   • Alcohol use Yes      Comment: 3 PER YEAR       Family Hx:  Family History   Problem Relation Age of Onset   • Hypertension Mother    • Arthritis Mother    • Alcohol abuse Father    • Heart disease Other    • Stroke Other    • Hypertension Other    • Other Other      Neurologic disorder   • Parkinsonism Other    • Stroke Other    • Heart disease Other    • Hypertension Other    • Heart disease Other    • Stroke Other    • Hypertension Other        Review of Imaging:  No new imaging since discharge but I did review the films from hospitalization.     Physical  Examination:  Vitals:    01/04/18 1349   BP: 122/75   Pulse: 80   Temp: 98.3 °F (36.8 °C)        General Appearance:   Well developed, well nourished, well groomed, alert, and cooperative.  Cardiovascular: Regular rate and rhythm. No carotid bruits      Neurological examination:  Neurologic Exam     Mental Status   Oriented to person, place, and time.   Speech: speech is normal   Level of consciousness: alert    Cranial Nerves   Cranial nerves II through XII intact.     Motor Exam   Muscle bulk: normal  Overall muscle tone: normal    Sensory Exam   Light touch normal.     Gait, Coordination, and Reflexes     Gait  Gait: normal      Diagnoses/Plan:    Mr. Harding is a 57 y.o. male who is seen today for follow up in stroke clinic.  He has made a full recovery with no residual.  He has not been checking his BG as he has no test strips and I will send in a script for those today.  He will need to follow up with his PCP for further refills.  He is currently taking all medications and I am changing his statin back to 40mg after reviewing his lab results.  He had no stroke on his MRI from December and his lab values were within normal limits on 40mg and I feel this is prudent for his care.  We discussed what signs and symptoms would warrant a return to the ED such as facial droop, slurred speech, inability to move, or weakness in an appendage.  He verbalizes understanding.

## 2018-01-22 RX ORDER — GABAPENTIN 300 MG/1
CAPSULE ORAL
Qty: 60 CAPSULE | Refills: 5 | OUTPATIENT
Start: 2018-01-22

## 2018-01-22 RX ORDER — MONTELUKAST SODIUM 10 MG/1
TABLET ORAL
Qty: 30 TABLET | Refills: 0 | OUTPATIENT
Start: 2018-01-22

## 2018-01-28 ENCOUNTER — APPOINTMENT (OUTPATIENT)
Dept: MRI IMAGING | Facility: HOSPITAL | Age: 58
End: 2018-01-28

## 2018-01-28 ENCOUNTER — APPOINTMENT (OUTPATIENT)
Dept: CT IMAGING | Facility: HOSPITAL | Age: 58
End: 2018-01-28

## 2018-01-28 ENCOUNTER — HOSPITAL ENCOUNTER (INPATIENT)
Facility: HOSPITAL | Age: 58
LOS: 4 days | Discharge: HOME | End: 2018-02-01
Attending: EMERGENCY MEDICINE | Admitting: INTERNAL MEDICINE

## 2018-01-28 DIAGNOSIS — G45.8 ACUTE ANTERIOR CIRCULATION TIA: Primary | ICD-10-CM

## 2018-01-28 DIAGNOSIS — Z74.09 IMPAIRED MOBILITY AND ADLS: ICD-10-CM

## 2018-01-28 DIAGNOSIS — I63.9 CEREBROVASCULAR ACCIDENT (CVA), UNSPECIFIED MECHANISM (HCC): ICD-10-CM

## 2018-01-28 DIAGNOSIS — Z74.09 IMPAIRED FUNCTIONAL MOBILITY, BALANCE, GAIT, AND ENDURANCE: ICD-10-CM

## 2018-01-28 DIAGNOSIS — Z78.9 IMPAIRED MOBILITY AND ADLS: ICD-10-CM

## 2018-01-28 PROBLEM — R51.9 HEADACHE: Status: ACTIVE | Noted: 2018-01-28

## 2018-01-28 PROBLEM — E11.9 TYPE 2 DIABETES MELLITUS (HCC): Status: ACTIVE | Noted: 2018-01-28

## 2018-01-28 LAB
ALBUMIN SERPL-MCNC: 4.1 G/DL (ref 3.2–4.8)
ALBUMIN/GLOB SERPL: 1.6 G/DL (ref 1.5–2.5)
ALP SERPL-CCNC: 94 U/L (ref 25–100)
ALT SERPL W P-5'-P-CCNC: 36 U/L (ref 7–40)
ANION GAP SERPL CALCULATED.3IONS-SCNC: 8 MMOL/L (ref 3–11)
APTT PPP: 27.3 SECONDS (ref 24–31)
AST SERPL-CCNC: 25 U/L (ref 0–33)
BASOPHILS # BLD AUTO: 0.03 10*3/MM3 (ref 0–0.2)
BASOPHILS NFR BLD AUTO: 0.4 % (ref 0–1)
BILIRUB SERPL-MCNC: 0.6 MG/DL (ref 0.3–1.2)
BILIRUB UR QL STRIP: NEGATIVE
BUN BLD-MCNC: 14 MG/DL (ref 9–23)
BUN/CREAT SERPL: 15.6 (ref 7–25)
CALCIUM SPEC-SCNC: 9.1 MG/DL (ref 8.7–10.4)
CHLORIDE SERPL-SCNC: 99 MMOL/L (ref 99–109)
CLARITY UR: CLEAR
CO2 SERPL-SCNC: 27 MMOL/L (ref 20–31)
COLOR UR: YELLOW
CREAT BLD-MCNC: 0.9 MG/DL (ref 0.6–1.3)
DEPRECATED RDW RBC AUTO: 41.4 FL (ref 37–54)
EOSINOPHIL # BLD AUTO: 0.24 10*3/MM3 (ref 0–0.3)
EOSINOPHIL NFR BLD AUTO: 3.6 % (ref 0–3)
ERYTHROCYTE [DISTWIDTH] IN BLOOD BY AUTOMATED COUNT: 14.7 % (ref 11.3–14.5)
GFR SERPL CREATININE-BSD FRML MDRD: 87 ML/MIN/1.73
GLOBULIN UR ELPH-MCNC: 2.5 GM/DL
GLUCOSE BLD-MCNC: 354 MG/DL (ref 70–100)
GLUCOSE BLDC GLUCOMTR-MCNC: 163 MG/DL (ref 70–130)
GLUCOSE UR STRIP-MCNC: ABNORMAL MG/DL
HCT VFR BLD AUTO: 40.2 % (ref 38.9–50.9)
HGB BLD-MCNC: 13.6 G/DL (ref 13.1–17.5)
HGB UR QL STRIP.AUTO: NEGATIVE
IMM GRANULOCYTES # BLD: 0.05 10*3/MM3 (ref 0–0.03)
IMM GRANULOCYTES NFR BLD: 0.7 % (ref 0–0.6)
INR PPP: 0.97
INR PPP: 1.1 (ref 0.8–1.2)
KETONES UR QL STRIP: NEGATIVE
LEUKOCYTE ESTERASE UR QL STRIP.AUTO: NEGATIVE
LYMPHOCYTES # BLD AUTO: 1.81 10*3/MM3 (ref 0.6–4.8)
LYMPHOCYTES NFR BLD AUTO: 26.9 % (ref 24–44)
MCH RBC QN AUTO: 26.1 PG (ref 27–31)
MCHC RBC AUTO-ENTMCNC: 33.8 G/DL (ref 32–36)
MCV RBC AUTO: 77.2 FL (ref 80–99)
MONOCYTES # BLD AUTO: 0.3 10*3/MM3 (ref 0–1)
MONOCYTES NFR BLD AUTO: 4.5 % (ref 0–12)
NEUTROPHILS # BLD AUTO: 4.3 10*3/MM3 (ref 1.5–8.3)
NEUTROPHILS NFR BLD AUTO: 63.9 % (ref 41–71)
NITRITE UR QL STRIP: NEGATIVE
PH UR STRIP.AUTO: 5.5 [PH] (ref 5–8)
PLATELET # BLD AUTO: 187 10*3/MM3 (ref 150–450)
PMV BLD AUTO: 10.7 FL (ref 6–12)
POTASSIUM BLD-SCNC: 3.7 MMOL/L (ref 3.5–5.5)
PROT SERPL-MCNC: 6.6 G/DL (ref 5.7–8.2)
PROT UR QL STRIP: NEGATIVE
PROTHROMBIN TIME: 10.6 SECONDS (ref 9.6–11.5)
PROTHROMBIN TIME: 13.1 SECONDS (ref 12.8–15.2)
RBC # BLD AUTO: 5.21 10*6/MM3 (ref 4.2–5.76)
SODIUM BLD-SCNC: 134 MMOL/L (ref 132–146)
SP GR UR STRIP: 1.03 (ref 1–1.03)
TROPONIN I SERPL-MCNC: 0 NG/ML (ref 0–0.07)
UROBILINOGEN UR QL STRIP: ABNORMAL
WBC NRBC COR # BLD: 6.73 10*3/MM3 (ref 3.5–10.8)

## 2018-01-28 PROCEDURE — A9270 NON-COVERED ITEM OR SERVICE: HCPCS | Performed by: NURSE PRACTITIONER

## 2018-01-28 PROCEDURE — 70496 CT ANGIOGRAPHY HEAD: CPT

## 2018-01-28 PROCEDURE — 84484 ASSAY OF TROPONIN QUANT: CPT

## 2018-01-28 PROCEDURE — 0042T HC CT CEREBRAL PERFUSION W/WO CONTRAST: CPT

## 2018-01-28 PROCEDURE — 80053 COMPREHEN METABOLIC PANEL: CPT | Performed by: EMERGENCY MEDICINE

## 2018-01-28 PROCEDURE — 70498 CT ANGIOGRAPHY NECK: CPT

## 2018-01-28 PROCEDURE — 63710000001 TOPIRAMATE 100 MG TABLET: Performed by: NURSE PRACTITIONER

## 2018-01-28 PROCEDURE — 0 IOPAMIDOL PER 1 ML: Performed by: EMERGENCY MEDICINE

## 2018-01-28 PROCEDURE — 99223 1ST HOSP IP/OBS HIGH 75: CPT | Performed by: INTERNAL MEDICINE

## 2018-01-28 PROCEDURE — 99284 EMERGENCY DEPT VISIT MOD MDM: CPT

## 2018-01-28 PROCEDURE — 85610 PROTHROMBIN TIME: CPT | Performed by: EMERGENCY MEDICINE

## 2018-01-28 PROCEDURE — 63710000001 ATORVASTATIN 40 MG TABLET: Performed by: NURSE PRACTITIONER

## 2018-01-28 PROCEDURE — 81003 URINALYSIS AUTO W/O SCOPE: CPT | Performed by: EMERGENCY MEDICINE

## 2018-01-28 PROCEDURE — 82962 GLUCOSE BLOOD TEST: CPT

## 2018-01-28 PROCEDURE — 25010000002 PROMETHAZINE PER 50 MG: Performed by: EMERGENCY MEDICINE

## 2018-01-28 PROCEDURE — 63710000001 INSULIN LISPRO (HUMAN) PER 5 UNITS: Performed by: NURSE PRACTITIONER

## 2018-01-28 PROCEDURE — 70450 CT HEAD/BRAIN W/O DYE: CPT

## 2018-01-28 PROCEDURE — 85610 PROTHROMBIN TIME: CPT

## 2018-01-28 PROCEDURE — 25010000002 KETOROLAC TROMETHAMINE PER 15 MG: Performed by: EMERGENCY MEDICINE

## 2018-01-28 PROCEDURE — 63710000001 INSULIN DETEMIR PER 5 UNITS: Performed by: NURSE PRACTITIONER

## 2018-01-28 PROCEDURE — 93005 ELECTROCARDIOGRAM TRACING: CPT | Performed by: EMERGENCY MEDICINE

## 2018-01-28 PROCEDURE — 85730 THROMBOPLASTIN TIME PARTIAL: CPT | Performed by: EMERGENCY MEDICINE

## 2018-01-28 PROCEDURE — 63710000001 METOPROLOL TARTRATE 50 MG TABLET: Performed by: NURSE PRACTITIONER

## 2018-01-28 PROCEDURE — 63710000001 CETIRIZINE 10 MG TABLET: Performed by: NURSE PRACTITIONER

## 2018-01-28 PROCEDURE — 85025 COMPLETE CBC W/AUTO DIFF WBC: CPT | Performed by: EMERGENCY MEDICINE

## 2018-01-28 PROCEDURE — 70551 MRI BRAIN STEM W/O DYE: CPT

## 2018-01-28 RX ORDER — KETOROLAC TROMETHAMINE 30 MG/ML
30 INJECTION, SOLUTION INTRAMUSCULAR; INTRAVENOUS ONCE
Status: COMPLETED | OUTPATIENT
Start: 2018-01-28 | End: 2018-01-28

## 2018-01-28 RX ORDER — AMLODIPINE BESYLATE 5 MG/1
5 TABLET ORAL DAILY
Status: DISCONTINUED | OUTPATIENT
Start: 2018-01-28 | End: 2018-02-01 | Stop reason: HOSPADM

## 2018-01-28 RX ORDER — NICOTINE POLACRILEX 4 MG
15 LOZENGE BUCCAL
Status: DISCONTINUED | OUTPATIENT
Start: 2018-01-28 | End: 2018-02-01 | Stop reason: HOSPADM

## 2018-01-28 RX ORDER — TOPIRAMATE 100 MG/1
100 TABLET, FILM COATED ORAL DAILY
Status: DISCONTINUED | OUTPATIENT
Start: 2018-01-28 | End: 2018-02-01 | Stop reason: HOSPADM

## 2018-01-28 RX ORDER — SODIUM CHLORIDE 0.9 % (FLUSH) 0.9 %
1-10 SYRINGE (ML) INJECTION AS NEEDED
Status: DISCONTINUED | OUTPATIENT
Start: 2018-01-28 | End: 2018-02-01 | Stop reason: HOSPADM

## 2018-01-28 RX ORDER — UBIDECARENONE 100 MG
100 CAPSULE ORAL DAILY
COMMUNITY

## 2018-01-28 RX ORDER — CETIRIZINE HYDROCHLORIDE 10 MG/1
10 TABLET ORAL NIGHTLY
Status: DISCONTINUED | OUTPATIENT
Start: 2018-01-28 | End: 2018-02-01 | Stop reason: HOSPADM

## 2018-01-28 RX ORDER — ASCORBIC ACID 500 MG
500 TABLET ORAL DAILY
COMMUNITY

## 2018-01-28 RX ORDER — DEXTROSE MONOHYDRATE 25 G/50ML
25 INJECTION, SOLUTION INTRAVENOUS
Status: DISCONTINUED | OUTPATIENT
Start: 2018-01-28 | End: 2018-02-01 | Stop reason: HOSPADM

## 2018-01-28 RX ORDER — FAMOTIDINE 20 MG/1
20 TABLET, FILM COATED ORAL NIGHTLY
Status: DISCONTINUED | OUTPATIENT
Start: 2018-01-28 | End: 2018-02-01 | Stop reason: HOSPADM

## 2018-01-28 RX ORDER — PROMETHAZINE HYDROCHLORIDE 25 MG/ML
12.5 INJECTION, SOLUTION INTRAMUSCULAR; INTRAVENOUS ONCE
Status: COMPLETED | OUTPATIENT
Start: 2018-01-28 | End: 2018-01-28

## 2018-01-28 RX ORDER — ASPIRIN 325 MG
325 TABLET ORAL DAILY
Status: DISCONTINUED | OUTPATIENT
Start: 2018-01-28 | End: 2018-02-01 | Stop reason: HOSPADM

## 2018-01-28 RX ORDER — FUROSEMIDE 40 MG/1
40 TABLET ORAL DAILY
Status: DISCONTINUED | OUTPATIENT
Start: 2018-01-28 | End: 2018-02-01 | Stop reason: HOSPADM

## 2018-01-28 RX ORDER — LISINOPRIL 20 MG/1
40 TABLET ORAL DAILY
Status: DISCONTINUED | OUTPATIENT
Start: 2018-01-28 | End: 2018-02-01 | Stop reason: HOSPADM

## 2018-01-28 RX ORDER — METOPROLOL TARTRATE 50 MG/1
50 TABLET, FILM COATED ORAL EVERY 12 HOURS SCHEDULED
Status: DISCONTINUED | OUTPATIENT
Start: 2018-01-28 | End: 2018-02-01 | Stop reason: HOSPADM

## 2018-01-28 RX ORDER — PROMETHAZINE HYDROCHLORIDE 25 MG/ML
INJECTION, SOLUTION INTRAMUSCULAR; INTRAVENOUS
Status: DISPENSED
Start: 2018-01-28 | End: 2018-01-29

## 2018-01-28 RX ORDER — ATORVASTATIN CALCIUM 40 MG/1
80 TABLET, FILM COATED ORAL NIGHTLY
Status: DISCONTINUED | OUTPATIENT
Start: 2018-01-28 | End: 2018-02-01 | Stop reason: HOSPADM

## 2018-01-28 RX ADMIN — TOPIRAMATE 100 MG: 100 TABLET, FILM COATED ORAL at 20:45

## 2018-01-28 RX ADMIN — METOPROLOL TARTRATE 50 MG: 50 TABLET ORAL at 20:46

## 2018-01-28 RX ADMIN — CETIRIZINE HYDROCHLORIDE 10 MG: 10 TABLET, FILM COATED ORAL at 20:46

## 2018-01-28 RX ADMIN — ATORVASTATIN CALCIUM 80 MG: 40 TABLET, FILM COATED ORAL at 20:46

## 2018-01-28 RX ADMIN — PROMETHAZINE HYDROCHLORIDE 12.5 MG: 25 INJECTION INTRAMUSCULAR; INTRAVENOUS at 18:19

## 2018-01-28 RX ADMIN — INSULIN DETEMIR 40 UNITS: 100 INJECTION, SOLUTION SUBCUTANEOUS at 21:52

## 2018-01-28 RX ADMIN — INSULIN LISPRO 2 UNITS: 100 INJECTION, SOLUTION INTRAVENOUS; SUBCUTANEOUS at 20:47

## 2018-01-28 RX ADMIN — KETOROLAC TROMETHAMINE 30 MG: 30 INJECTION, SOLUTION INTRAMUSCULAR at 18:24

## 2018-01-28 RX ADMIN — IOPAMIDOL 115 ML: 755 INJECTION, SOLUTION INTRAVENOUS at 14:15

## 2018-01-29 ENCOUNTER — APPOINTMENT (OUTPATIENT)
Dept: CARDIOLOGY | Facility: HOSPITAL | Age: 58
End: 2018-01-29

## 2018-01-29 PROBLEM — G44.59 HEADACHE SYNDROME, COMPLICATED: Status: ACTIVE | Noted: 2018-01-28

## 2018-01-29 LAB
ANION GAP SERPL CALCULATED.3IONS-SCNC: 10 MMOL/L (ref 3–11)
ARTICHOKE IGE QN: 65 MG/DL (ref 0–130)
BASOPHILS # BLD AUTO: 0.04 10*3/MM3 (ref 0–0.2)
BASOPHILS NFR BLD AUTO: 0.6 % (ref 0–1)
BH CV ECHO MEAS - AO ROOT AREA (BSA CORRECTED): 1.5
BH CV ECHO MEAS - AO ROOT AREA: 9.5 CM^2
BH CV ECHO MEAS - AO ROOT DIAM: 3.5 CM
BH CV ECHO MEAS - BSA(HAYCOCK): 2.5 M^2
BH CV ECHO MEAS - BSA: 2.4 M^2
BH CV ECHO MEAS - BZI_BMI: 38.6 KILOGRAMS/M^2
BH CV ECHO MEAS - BZI_METRIC_HEIGHT: 177.8 CM
BH CV ECHO MEAS - BZI_METRIC_WEIGHT: 122 KG
BH CV ECHO MEAS - CONTRAST EF (2CH): 71.1 ML/M^2
BH CV ECHO MEAS - CONTRAST EF 4CH: 39.6 ML/M^2
BH CV ECHO MEAS - EDV(CUBED): 91.3 ML
BH CV ECHO MEAS - EDV(MOD-SP2): 121 ML
BH CV ECHO MEAS - EDV(MOD-SP4): 96 ML
BH CV ECHO MEAS - EDV(TEICH): 92.6 ML
BH CV ECHO MEAS - EF(CUBED): 68.6 %
BH CV ECHO MEAS - EF(MOD-SP2): 71.1 %
BH CV ECHO MEAS - EF(TEICH): 60.3 %
BH CV ECHO MEAS - ESV(CUBED): 28.7 ML
BH CV ECHO MEAS - ESV(MOD-SP2): 35 ML
BH CV ECHO MEAS - ESV(MOD-SP4): 58 ML
BH CV ECHO MEAS - ESV(TEICH): 36.8 ML
BH CV ECHO MEAS - FS: 32 %
BH CV ECHO MEAS - IVS/LVPW: 1
BH CV ECHO MEAS - IVSD: 1.5 CM
BH CV ECHO MEAS - LA DIMENSION: 5.5 CM
BH CV ECHO MEAS - LA/AO: 1.6
BH CV ECHO MEAS - LAT PEAK E' VEL: 5.8 CM/SEC
BH CV ECHO MEAS - LV DIASTOLIC VOL/BSA (35-75): 40.5 ML/M^2
BH CV ECHO MEAS - LV MASS(C)D: 279.4 GRAMS
BH CV ECHO MEAS - LV MASS(C)DI: 118 GRAMS/M^2
BH CV ECHO MEAS - LV SYSTOLIC VOL/BSA (12-30): 24.5 ML/M^2
BH CV ECHO MEAS - LVIDD: 4.5 CM
BH CV ECHO MEAS - LVIDS: 3.1 CM
BH CV ECHO MEAS - LVLD AP2: 7.5 CM
BH CV ECHO MEAS - LVLD AP4: 8 CM
BH CV ECHO MEAS - LVLS AP2: 6.3 CM
BH CV ECHO MEAS - LVLS AP4: 7.3 CM
BH CV ECHO MEAS - LVPWD: 1.5 CM
BH CV ECHO MEAS - MED PEAK E' VEL: 6.8 CM/SEC
BH CV ECHO MEAS - MV A MAX VEL: 79 CM/SEC
BH CV ECHO MEAS - MV E MAX VEL: 77.5 CM/SEC
BH CV ECHO MEAS - MV E/A: 0.98
BH CV ECHO MEAS - PA ACC SLOPE: 499.6 CM/SEC^2
BH CV ECHO MEAS - PA ACC TIME: 0.14 SEC
BH CV ECHO MEAS - PA PR(ACCEL): 17.2 MMHG
BH CV ECHO MEAS - SI(CUBED): 26.4 ML/M^2
BH CV ECHO MEAS - SI(MOD-SP2): 36.3 ML/M^2
BH CV ECHO MEAS - SI(MOD-SP4): 16.1 ML/M^2
BH CV ECHO MEAS - SI(TEICH): 23.6 ML/M^2
BH CV ECHO MEAS - SV(CUBED): 62.6 ML
BH CV ECHO MEAS - SV(MOD-SP2): 86 ML
BH CV ECHO MEAS - SV(MOD-SP4): 38 ML
BH CV ECHO MEAS - SV(TEICH): 55.8 ML
BH CV VAS BP RIGHT ARM: NORMAL MMHG
BUN BLD-MCNC: 12 MG/DL (ref 9–23)
BUN/CREAT SERPL: 17.1 (ref 7–25)
CALCIUM SPEC-SCNC: 8.9 MG/DL (ref 8.7–10.4)
CHLORIDE SERPL-SCNC: 103 MMOL/L (ref 99–109)
CHOLEST SERPL-MCNC: 100 MG/DL (ref 0–200)
CO2 SERPL-SCNC: 23 MMOL/L (ref 20–31)
CREAT BLD-MCNC: 0.7 MG/DL (ref 0.6–1.3)
DEPRECATED RDW RBC AUTO: 41 FL (ref 37–54)
EOSINOPHIL # BLD AUTO: 0.31 10*3/MM3 (ref 0–0.3)
EOSINOPHIL NFR BLD AUTO: 4.8 % (ref 0–3)
ERYTHROCYTE [DISTWIDTH] IN BLOOD BY AUTOMATED COUNT: 14.6 % (ref 11.3–14.5)
GFR SERPL CREATININE-BSD FRML MDRD: 117 ML/MIN/1.73
GLUCOSE BLD-MCNC: 164 MG/DL (ref 70–100)
GLUCOSE BLDC GLUCOMTR-MCNC: 188 MG/DL (ref 70–130)
GLUCOSE BLDC GLUCOMTR-MCNC: 254 MG/DL (ref 70–130)
GLUCOSE BLDC GLUCOMTR-MCNC: 302 MG/DL (ref 70–130)
GLUCOSE BLDC GLUCOMTR-MCNC: 311 MG/DL (ref 70–130)
HBA1C MFR BLD: 9.6 % (ref 4.8–5.6)
HCT VFR BLD AUTO: 39 % (ref 38.9–50.9)
HDLC SERPL-MCNC: 25 MG/DL (ref 40–60)
HGB BLD-MCNC: 13 G/DL (ref 13.1–17.5)
IMM GRANULOCYTES # BLD: 0.04 10*3/MM3 (ref 0–0.03)
IMM GRANULOCYTES NFR BLD: 0.6 % (ref 0–0.6)
LYMPHOCYTES # BLD AUTO: 2.13 10*3/MM3 (ref 0.6–4.8)
LYMPHOCYTES NFR BLD AUTO: 33.1 % (ref 24–44)
MAXIMAL PREDICTED HEART RATE: 164 BPM
MCH RBC QN AUTO: 25.6 PG (ref 27–31)
MCHC RBC AUTO-ENTMCNC: 33.3 G/DL (ref 32–36)
MCV RBC AUTO: 76.8 FL (ref 80–99)
MONOCYTES # BLD AUTO: 0.41 10*3/MM3 (ref 0–1)
MONOCYTES NFR BLD AUTO: 6.4 % (ref 0–12)
NEUTROPHILS # BLD AUTO: 3.5 10*3/MM3 (ref 1.5–8.3)
NEUTROPHILS NFR BLD AUTO: 54.5 % (ref 41–71)
PLATELET # BLD AUTO: 160 10*3/MM3 (ref 150–450)
PMV BLD AUTO: 10.7 FL (ref 6–12)
POTASSIUM BLD-SCNC: 3.6 MMOL/L (ref 3.5–5.5)
RBC # BLD AUTO: 5.08 10*6/MM3 (ref 4.2–5.76)
SODIUM BLD-SCNC: 136 MMOL/L (ref 132–146)
STRESS TARGET HR: 139 BPM
TRIGL SERPL-MCNC: 110 MG/DL (ref 0–150)
WBC NRBC COR # BLD: 6.43 10*3/MM3 (ref 3.5–10.8)

## 2018-01-29 PROCEDURE — A9270 NON-COVERED ITEM OR SERVICE: HCPCS | Performed by: NURSE PRACTITIONER

## 2018-01-29 PROCEDURE — 93306 TTE W/DOPPLER COMPLETE: CPT | Performed by: INTERNAL MEDICINE

## 2018-01-29 PROCEDURE — 63710000001 ASPIRIN 325 MG TABLET: Performed by: NURSE PRACTITIONER

## 2018-01-29 PROCEDURE — 82962 GLUCOSE BLOOD TEST: CPT

## 2018-01-29 PROCEDURE — 99233 SBSQ HOSP IP/OBS HIGH 50: CPT | Performed by: INTERNAL MEDICINE

## 2018-01-29 PROCEDURE — 63710000001 INSULIN LISPRO (HUMAN) PER 5 UNITS: Performed by: NURSE PRACTITIONER

## 2018-01-29 PROCEDURE — G0108 DIAB MANAGE TRN  PER INDIV: HCPCS | Performed by: REGISTERED NURSE

## 2018-01-29 PROCEDURE — 63710000001 FUROSEMIDE 40 MG TABLET: Performed by: NURSE PRACTITIONER

## 2018-01-29 PROCEDURE — 97161 PT EVAL LOW COMPLEX 20 MIN: CPT

## 2018-01-29 PROCEDURE — 83036 HEMOGLOBIN GLYCOSYLATED A1C: CPT | Performed by: NURSE PRACTITIONER

## 2018-01-29 PROCEDURE — 25010000002 HEPARIN (PORCINE) PER 1000 UNITS: Performed by: INTERNAL MEDICINE

## 2018-01-29 PROCEDURE — 63710000001 LISINOPRIL 20 MG TABLET: Performed by: NURSE PRACTITIONER

## 2018-01-29 PROCEDURE — 85025 COMPLETE CBC W/AUTO DIFF WBC: CPT | Performed by: NURSE PRACTITIONER

## 2018-01-29 PROCEDURE — G8989 SELF CARE D/C STATUS: HCPCS | Performed by: OCCUPATIONAL THERAPIST

## 2018-01-29 PROCEDURE — 63710000001 METOPROLOL TARTRATE 50 MG TABLET: Performed by: NURSE PRACTITIONER

## 2018-01-29 PROCEDURE — 63710000001 INSULIN DETEMIR PER 5 UNITS: Performed by: NURSE PRACTITIONER

## 2018-01-29 PROCEDURE — 92523 SPEECH SOUND LANG COMPREHEN: CPT

## 2018-01-29 PROCEDURE — 25010000002 SULFUR HEXAFLUORIDE MICROSPH 60.7-25 MG RECONSTITUTED SUSPENSION: Performed by: INTERNAL MEDICINE

## 2018-01-29 PROCEDURE — 80048 BASIC METABOLIC PNL TOTAL CA: CPT | Performed by: NURSE PRACTITIONER

## 2018-01-29 PROCEDURE — 63710000001 AMLODIPINE 5 MG TABLET: Performed by: NURSE PRACTITIONER

## 2018-01-29 PROCEDURE — G9186 MOTOR SPEECH GOAL STATUS: HCPCS

## 2018-01-29 PROCEDURE — 63710000001 ACETAMINOPHEN 325 MG TABLET: Performed by: NURSE PRACTITIONER

## 2018-01-29 PROCEDURE — 80061 LIPID PANEL: CPT | Performed by: NURSE PRACTITIONER

## 2018-01-29 PROCEDURE — 97165 OT EVAL LOW COMPLEX 30 MIN: CPT | Performed by: OCCUPATIONAL THERAPIST

## 2018-01-29 PROCEDURE — G8987 SELF CARE CURRENT STATUS: HCPCS | Performed by: OCCUPATIONAL THERAPIST

## 2018-01-29 PROCEDURE — 93306 TTE W/DOPPLER COMPLETE: CPT

## 2018-01-29 PROCEDURE — 99222 1ST HOSP IP/OBS MODERATE 55: CPT | Performed by: PSYCHIATRY & NEUROLOGY

## 2018-01-29 PROCEDURE — G8988 SELF CARE GOAL STATUS: HCPCS | Performed by: OCCUPATIONAL THERAPIST

## 2018-01-29 PROCEDURE — G9158 MOTOR SPEECH D/C STATUS: HCPCS

## 2018-01-29 PROCEDURE — G8978 MOBILITY CURRENT STATUS: HCPCS

## 2018-01-29 PROCEDURE — G8999 MOTOR SPEECH CURRENT STATUS: HCPCS

## 2018-01-29 PROCEDURE — 63710000001 TOPIRAMATE 100 MG TABLET: Performed by: NURSE PRACTITIONER

## 2018-01-29 PROCEDURE — G8979 MOBILITY GOAL STATUS: HCPCS

## 2018-01-29 RX ORDER — HEPARIN SODIUM 5000 [USP'U]/ML
5000 INJECTION, SOLUTION INTRAVENOUS; SUBCUTANEOUS EVERY 8 HOURS SCHEDULED
Status: DISCONTINUED | OUTPATIENT
Start: 2018-01-29 | End: 2018-02-01 | Stop reason: HOSPADM

## 2018-01-29 RX ORDER — ACETAMINOPHEN 325 MG/1
650 TABLET ORAL EVERY 6 HOURS PRN
Status: DISCONTINUED | OUTPATIENT
Start: 2018-01-29 | End: 2018-02-01 | Stop reason: HOSPADM

## 2018-01-29 RX ADMIN — HEPARIN SODIUM 5000 UNITS: 5000 INJECTION, SOLUTION INTRAVENOUS; SUBCUTANEOUS at 17:17

## 2018-01-29 RX ADMIN — HEPARIN SODIUM 5000 UNITS: 5000 INJECTION, SOLUTION INTRAVENOUS; SUBCUTANEOUS at 20:45

## 2018-01-29 RX ADMIN — METOPROLOL TARTRATE 50 MG: 50 TABLET ORAL at 08:18

## 2018-01-29 RX ADMIN — FUROSEMIDE 40 MG: 40 TABLET ORAL at 08:19

## 2018-01-29 RX ADMIN — ASPIRIN 325 MG ORAL TABLET 325 MG: 325 PILL ORAL at 08:19

## 2018-01-29 RX ADMIN — INSULIN LISPRO 2 UNITS: 100 INJECTION, SOLUTION INTRAVENOUS; SUBCUTANEOUS at 08:22

## 2018-01-29 RX ADMIN — AMLODIPINE BESYLATE 5 MG: 5 TABLET ORAL at 08:19

## 2018-01-29 RX ADMIN — FAMOTIDINE 20 MG: 20 TABLET, FILM COATED ORAL at 20:43

## 2018-01-29 RX ADMIN — METOPROLOL TARTRATE 50 MG: 50 TABLET ORAL at 20:43

## 2018-01-29 RX ADMIN — INSULIN LISPRO 7 UNITS: 100 INJECTION, SOLUTION INTRAVENOUS; SUBCUTANEOUS at 17:17

## 2018-01-29 RX ADMIN — SULFUR HEXAFLUORIDE 2 ML: KIT at 10:00

## 2018-01-29 RX ADMIN — INSULIN LISPRO 10 UNITS: 100 INJECTION, SOLUTION INTRAVENOUS; SUBCUTANEOUS at 11:54

## 2018-01-29 RX ADMIN — CETIRIZINE HYDROCHLORIDE 10 MG: 10 TABLET, FILM COATED ORAL at 20:43

## 2018-01-29 RX ADMIN — INSULIN LISPRO 10 UNITS: 100 INJECTION, SOLUTION INTRAVENOUS; SUBCUTANEOUS at 08:21

## 2018-01-29 RX ADMIN — INSULIN LISPRO 6 UNITS: 100 INJECTION, SOLUTION INTRAVENOUS; SUBCUTANEOUS at 20:44

## 2018-01-29 RX ADMIN — ATORVASTATIN CALCIUM 80 MG: 40 TABLET, FILM COATED ORAL at 20:43

## 2018-01-29 RX ADMIN — LISINOPRIL 40 MG: 20 TABLET ORAL at 08:19

## 2018-01-29 RX ADMIN — ACETAMINOPHEN 650 MG: 325 TABLET, FILM COATED ORAL at 20:43

## 2018-01-29 RX ADMIN — INSULIN LISPRO 7 UNITS: 100 INJECTION, SOLUTION INTRAVENOUS; SUBCUTANEOUS at 11:55

## 2018-01-29 RX ADMIN — VALPROATE SODIUM 700 MG: 100 INJECTION, SOLUTION INTRAVENOUS at 14:33

## 2018-01-29 RX ADMIN — VALPROATE SODIUM 700 MG: 100 INJECTION, SOLUTION INTRAVENOUS at 20:44

## 2018-01-29 RX ADMIN — TOPIRAMATE 100 MG: 100 TABLET, FILM COATED ORAL at 08:19

## 2018-01-29 RX ADMIN — ACETAMINOPHEN 650 MG: 325 TABLET, FILM COATED ORAL at 03:47

## 2018-01-29 RX ADMIN — INSULIN DETEMIR 40 UNITS: 100 INJECTION, SOLUTION SUBCUTANEOUS at 20:46

## 2018-01-30 LAB
GLUCOSE BLDC GLUCOMTR-MCNC: 125 MG/DL (ref 70–130)
GLUCOSE BLDC GLUCOMTR-MCNC: 154 MG/DL (ref 70–130)
GLUCOSE BLDC GLUCOMTR-MCNC: 162 MG/DL (ref 70–130)
GLUCOSE BLDC GLUCOMTR-MCNC: 182 MG/DL (ref 70–130)

## 2018-01-30 PROCEDURE — 99232 SBSQ HOSP IP/OBS MODERATE 35: CPT | Performed by: PSYCHIATRY & NEUROLOGY

## 2018-01-30 PROCEDURE — 63710000001 PROMETHAZINE PER 12.5 MG: Performed by: INTERNAL MEDICINE

## 2018-01-30 PROCEDURE — 25010000002 KETOROLAC TROMETHAMINE PER 15 MG: Performed by: PSYCHIATRY & NEUROLOGY

## 2018-01-30 PROCEDURE — 63710000001 INSULIN DETEMIR PER 5 UNITS: Performed by: NURSE PRACTITIONER

## 2018-01-30 PROCEDURE — 99232 SBSQ HOSP IP/OBS MODERATE 35: CPT | Performed by: NURSE PRACTITIONER

## 2018-01-30 PROCEDURE — 25010000002 HEPARIN (PORCINE) PER 1000 UNITS: Performed by: INTERNAL MEDICINE

## 2018-01-30 PROCEDURE — 82962 GLUCOSE BLOOD TEST: CPT

## 2018-01-30 RX ORDER — PROMETHAZINE HYDROCHLORIDE 12.5 MG/1
12.5 TABLET ORAL EVERY 6 HOURS PRN
Status: DISCONTINUED | OUTPATIENT
Start: 2018-01-30 | End: 2018-02-01 | Stop reason: HOSPADM

## 2018-01-30 RX ORDER — KETOROLAC TROMETHAMINE 30 MG/ML
30 INJECTION, SOLUTION INTRAMUSCULAR; INTRAVENOUS EVERY 6 HOURS PRN
Status: DISCONTINUED | OUTPATIENT
Start: 2018-01-30 | End: 2018-01-31

## 2018-01-30 RX ORDER — PROMETHAZINE HYDROCHLORIDE 25 MG/ML
12.5 INJECTION, SOLUTION INTRAMUSCULAR; INTRAVENOUS EVERY 6 HOURS PRN
Status: DISCONTINUED | OUTPATIENT
Start: 2018-01-30 | End: 2018-02-01 | Stop reason: HOSPADM

## 2018-01-30 RX ADMIN — LISINOPRIL 40 MG: 20 TABLET ORAL at 08:45

## 2018-01-30 RX ADMIN — VALPROATE SODIUM 1000 MG: 100 INJECTION, SOLUTION INTRAVENOUS at 17:37

## 2018-01-30 RX ADMIN — INSULIN LISPRO 2 UNITS: 100 INJECTION, SOLUTION INTRAVENOUS; SUBCUTANEOUS at 17:37

## 2018-01-30 RX ADMIN — METOPROLOL TARTRATE 50 MG: 50 TABLET ORAL at 08:46

## 2018-01-30 RX ADMIN — VALPROATE SODIUM 700 MG: 100 INJECTION, SOLUTION INTRAVENOUS at 08:53

## 2018-01-30 RX ADMIN — ATORVASTATIN CALCIUM 80 MG: 40 TABLET, FILM COATED ORAL at 21:01

## 2018-01-30 RX ADMIN — FAMOTIDINE 20 MG: 20 TABLET, FILM COATED ORAL at 21:01

## 2018-01-30 RX ADMIN — PROMETHAZINE HYDROCHLORIDE 12.5 MG: 12.5 TABLET ORAL at 21:01

## 2018-01-30 RX ADMIN — HEPARIN SODIUM 5000 UNITS: 5000 INJECTION, SOLUTION INTRAVENOUS; SUBCUTANEOUS at 14:03

## 2018-01-30 RX ADMIN — INSULIN LISPRO 2 UNITS: 100 INJECTION, SOLUTION INTRAVENOUS; SUBCUTANEOUS at 08:58

## 2018-01-30 RX ADMIN — VALPROATE SODIUM 700 MG: 100 INJECTION, SOLUTION INTRAVENOUS at 02:24

## 2018-01-30 RX ADMIN — AMLODIPINE BESYLATE 5 MG: 5 TABLET ORAL at 08:46

## 2018-01-30 RX ADMIN — KETOROLAC TROMETHAMINE 30 MG: 30 INJECTION, SOLUTION INTRAMUSCULAR at 13:59

## 2018-01-30 RX ADMIN — KETOROLAC TROMETHAMINE 30 MG: 30 INJECTION, SOLUTION INTRAMUSCULAR at 21:01

## 2018-01-30 RX ADMIN — ASPIRIN 325 MG ORAL TABLET 325 MG: 325 PILL ORAL at 08:46

## 2018-01-30 RX ADMIN — HEPARIN SODIUM 5000 UNITS: 5000 INJECTION, SOLUTION INTRAVENOUS; SUBCUTANEOUS at 21:01

## 2018-01-30 RX ADMIN — PROMETHAZINE HYDROCHLORIDE 12.5 MG: 12.5 TABLET ORAL at 08:54

## 2018-01-30 RX ADMIN — METOPROLOL TARTRATE 50 MG: 50 TABLET ORAL at 21:01

## 2018-01-30 RX ADMIN — CETIRIZINE HYDROCHLORIDE 10 MG: 10 TABLET, FILM COATED ORAL at 21:01

## 2018-01-30 RX ADMIN — HEPARIN SODIUM 5000 UNITS: 5000 INJECTION, SOLUTION INTRAVENOUS; SUBCUTANEOUS at 06:06

## 2018-01-30 RX ADMIN — INSULIN DETEMIR 40 UNITS: 100 INJECTION, SOLUTION SUBCUTANEOUS at 21:02

## 2018-01-30 RX ADMIN — FUROSEMIDE 40 MG: 40 TABLET ORAL at 08:46

## 2018-01-30 RX ADMIN — INSULIN LISPRO 2 UNITS: 100 INJECTION, SOLUTION INTRAVENOUS; SUBCUTANEOUS at 12:08

## 2018-01-30 RX ADMIN — TOPIRAMATE 100 MG: 100 TABLET, FILM COATED ORAL at 08:48

## 2018-01-31 LAB
GLUCOSE BLDC GLUCOMTR-MCNC: 186 MG/DL (ref 70–130)
GLUCOSE BLDC GLUCOMTR-MCNC: 263 MG/DL (ref 70–130)
GLUCOSE BLDC GLUCOMTR-MCNC: 290 MG/DL (ref 70–130)
GLUCOSE BLDC GLUCOMTR-MCNC: 294 MG/DL (ref 70–130)

## 2018-01-31 PROCEDURE — 25010000002 HEPARIN (PORCINE) PER 1000 UNITS: Performed by: INTERNAL MEDICINE

## 2018-01-31 PROCEDURE — 99233 SBSQ HOSP IP/OBS HIGH 50: CPT | Performed by: NURSE PRACTITIONER

## 2018-01-31 PROCEDURE — 63710000001 INSULIN DETEMIR PER 5 UNITS: Performed by: NURSE PRACTITIONER

## 2018-01-31 PROCEDURE — 97110 THERAPEUTIC EXERCISES: CPT

## 2018-01-31 PROCEDURE — 25010000002 METHYLPREDNISOLONE PER 125 MG: Performed by: NURSE PRACTITIONER

## 2018-01-31 PROCEDURE — 25010000002 KETOROLAC TROMETHAMINE PER 15 MG: Performed by: PSYCHIATRY & NEUROLOGY

## 2018-01-31 PROCEDURE — 25010000002 PROMETHAZINE PER 50 MG: Performed by: NURSE PRACTITIONER

## 2018-01-31 PROCEDURE — 97116 GAIT TRAINING THERAPY: CPT

## 2018-01-31 PROCEDURE — 82962 GLUCOSE BLOOD TEST: CPT

## 2018-01-31 PROCEDURE — 99232 SBSQ HOSP IP/OBS MODERATE 35: CPT | Performed by: PSYCHIATRY & NEUROLOGY

## 2018-01-31 RX ORDER — TRAMADOL HYDROCHLORIDE 50 MG/1
50 TABLET ORAL EVERY 6 HOURS PRN
Status: DISCONTINUED | OUTPATIENT
Start: 2018-01-31 | End: 2018-02-01 | Stop reason: HOSPADM

## 2018-01-31 RX ORDER — INDOMETHACIN 25 MG/1
75 CAPSULE ORAL 2 TIMES DAILY WITH MEALS
Status: DISCONTINUED | OUTPATIENT
Start: 2018-01-31 | End: 2018-01-31

## 2018-01-31 RX ORDER — INDOMETHACIN 25 MG/1
50 CAPSULE ORAL
Status: DISCONTINUED | OUTPATIENT
Start: 2018-01-31 | End: 2018-02-01 | Stop reason: HOSPADM

## 2018-01-31 RX ORDER — METHYLPREDNISOLONE SODIUM SUCCINATE 125 MG/2ML
60 INJECTION, POWDER, LYOPHILIZED, FOR SOLUTION INTRAMUSCULAR; INTRAVENOUS ONCE
Status: COMPLETED | OUTPATIENT
Start: 2018-01-31 | End: 2018-01-31

## 2018-01-31 RX ORDER — PROMETHAZINE HYDROCHLORIDE 25 MG/ML
12.5 INJECTION, SOLUTION INTRAMUSCULAR; INTRAVENOUS ONCE
Status: COMPLETED | OUTPATIENT
Start: 2018-01-31 | End: 2018-01-31

## 2018-01-31 RX ADMIN — FAMOTIDINE 20 MG: 20 TABLET, FILM COATED ORAL at 20:46

## 2018-01-31 RX ADMIN — INDOMETHACIN 75 MG: 25 CAPSULE ORAL at 13:18

## 2018-01-31 RX ADMIN — INDOMETHACIN 50 MG: 25 CAPSULE ORAL at 20:46

## 2018-01-31 RX ADMIN — ATORVASTATIN CALCIUM 80 MG: 40 TABLET, FILM COATED ORAL at 20:46

## 2018-01-31 RX ADMIN — METOPROLOL TARTRATE 50 MG: 50 TABLET ORAL at 20:46

## 2018-01-31 RX ADMIN — VALPROATE SODIUM 1000 MG: 100 INJECTION, SOLUTION INTRAVENOUS at 09:01

## 2018-01-31 RX ADMIN — HEPARIN SODIUM 5000 UNITS: 5000 INJECTION, SOLUTION INTRAVENOUS; SUBCUTANEOUS at 20:47

## 2018-01-31 RX ADMIN — INSULIN LISPRO 2 UNITS: 100 INJECTION, SOLUTION INTRAVENOUS; SUBCUTANEOUS at 08:15

## 2018-01-31 RX ADMIN — AMLODIPINE BESYLATE 5 MG: 5 TABLET ORAL at 08:20

## 2018-01-31 RX ADMIN — INSULIN LISPRO 6 UNITS: 100 INJECTION, SOLUTION INTRAVENOUS; SUBCUTANEOUS at 16:46

## 2018-01-31 RX ADMIN — INSULIN LISPRO 6 UNITS: 100 INJECTION, SOLUTION INTRAVENOUS; SUBCUTANEOUS at 11:55

## 2018-01-31 RX ADMIN — PROMETHAZINE HYDROCHLORIDE 12.5 MG: 25 INJECTION INTRAMUSCULAR; INTRAVENOUS at 16:43

## 2018-01-31 RX ADMIN — ASPIRIN 325 MG ORAL TABLET 325 MG: 325 PILL ORAL at 08:20

## 2018-01-31 RX ADMIN — CETIRIZINE HYDROCHLORIDE 10 MG: 10 TABLET, FILM COATED ORAL at 20:46

## 2018-01-31 RX ADMIN — LISINOPRIL 40 MG: 20 TABLET ORAL at 08:20

## 2018-01-31 RX ADMIN — TRAMADOL HYDROCHLORIDE 50 MG: 50 TABLET, COATED ORAL at 16:43

## 2018-01-31 RX ADMIN — TOPIRAMATE 100 MG: 100 TABLET, FILM COATED ORAL at 08:20

## 2018-01-31 RX ADMIN — INSULIN DETEMIR 50 UNITS: 100 INJECTION, SOLUTION SUBCUTANEOUS at 20:51

## 2018-01-31 RX ADMIN — METHYLPREDNISOLONE SODIUM SUCCINATE 60 MG: 125 INJECTION, POWDER, FOR SOLUTION INTRAMUSCULAR; INTRAVENOUS at 16:43

## 2018-01-31 RX ADMIN — VALPROATE SODIUM 1000 MG: 100 INJECTION, SOLUTION INTRAVENOUS at 02:00

## 2018-01-31 RX ADMIN — FUROSEMIDE 40 MG: 40 TABLET ORAL at 08:20

## 2018-01-31 RX ADMIN — INSULIN LISPRO 6 UNITS: 100 INJECTION, SOLUTION INTRAVENOUS; SUBCUTANEOUS at 20:46

## 2018-01-31 RX ADMIN — METOPROLOL TARTRATE 50 MG: 50 TABLET ORAL at 08:20

## 2018-01-31 RX ADMIN — KETOROLAC TROMETHAMINE 30 MG: 30 INJECTION, SOLUTION INTRAMUSCULAR at 05:50

## 2018-01-31 RX ADMIN — HEPARIN SODIUM 5000 UNITS: 5000 INJECTION, SOLUTION INTRAVENOUS; SUBCUTANEOUS at 05:49

## 2018-01-31 RX ADMIN — HEPARIN SODIUM 5000 UNITS: 5000 INJECTION, SOLUTION INTRAVENOUS; SUBCUTANEOUS at 13:27

## 2018-02-01 VITALS
HEART RATE: 74 BPM | OXYGEN SATURATION: 96 % | HEIGHT: 70 IN | WEIGHT: 273.6 LBS | TEMPERATURE: 97.9 F | BODY MASS INDEX: 39.17 KG/M2 | RESPIRATION RATE: 16 BRPM | DIASTOLIC BLOOD PRESSURE: 79 MMHG | SYSTOLIC BLOOD PRESSURE: 144 MMHG

## 2018-02-01 LAB
ERYTHROCYTE [SEDIMENTATION RATE] IN BLOOD: 14 MM/HR (ref 0–20)
GLUCOSE BLDC GLUCOMTR-MCNC: 365 MG/DL (ref 70–130)
GLUCOSE BLDC GLUCOMTR-MCNC: 402 MG/DL (ref 70–130)

## 2018-02-01 PROCEDURE — 99239 HOSP IP/OBS DSCHRG MGMT >30: CPT | Performed by: INTERNAL MEDICINE

## 2018-02-01 PROCEDURE — 82962 GLUCOSE BLOOD TEST: CPT

## 2018-02-01 PROCEDURE — 85652 RBC SED RATE AUTOMATED: CPT | Performed by: PSYCHIATRY & NEUROLOGY

## 2018-02-01 PROCEDURE — 25010000002 HEPARIN (PORCINE) PER 1000 UNITS: Performed by: INTERNAL MEDICINE

## 2018-02-01 RX ORDER — METHYLPREDNISOLONE 4 MG/1
TABLET ORAL
Qty: 1 EACH | Refills: 0 | Status: SHIPPED | OUTPATIENT
Start: 2018-02-01 | End: 2018-03-02

## 2018-02-01 RX ORDER — INDOMETHACIN 50 MG/1
50 CAPSULE ORAL
Qty: 15 CAPSULE | Refills: 0 | Status: SHIPPED | OUTPATIENT
Start: 2018-02-01 | End: 2018-06-18

## 2018-02-01 RX ADMIN — AMLODIPINE BESYLATE 5 MG: 5 TABLET ORAL at 08:19

## 2018-02-01 RX ADMIN — LISINOPRIL 40 MG: 20 TABLET ORAL at 08:19

## 2018-02-01 RX ADMIN — ASPIRIN 325 MG ORAL TABLET 325 MG: 325 PILL ORAL at 08:19

## 2018-02-01 RX ADMIN — FUROSEMIDE 40 MG: 40 TABLET ORAL at 08:19

## 2018-02-01 RX ADMIN — HEPARIN SODIUM 5000 UNITS: 5000 INJECTION, SOLUTION INTRAVENOUS; SUBCUTANEOUS at 05:31

## 2018-02-01 RX ADMIN — INDOMETHACIN 50 MG: 25 CAPSULE ORAL at 08:19

## 2018-02-01 RX ADMIN — METOPROLOL TARTRATE 50 MG: 50 TABLET ORAL at 08:19

## 2018-02-01 RX ADMIN — HEPARIN SODIUM 5000 UNITS: 5000 INJECTION, SOLUTION INTRAVENOUS; SUBCUTANEOUS at 14:24

## 2018-02-01 RX ADMIN — TOPIRAMATE 100 MG: 100 TABLET, FILM COATED ORAL at 08:19

## 2018-02-01 RX ADMIN — INDOMETHACIN 50 MG: 25 CAPSULE ORAL at 11:56

## 2018-02-01 NOTE — PROGRESS NOTES
Continued Stay Note   Dani     Patient Name: Denzel Harding  MRN: [id number]  Today's Date: 2/1/2018    Admit Date: 1/28/2018          Discharge Plan       02/01/18 1513    Case Management/Social Work Plan    Plan Home with family + outpatient PT    Patient/Family In Agreement With Plan yes    Additional Comments Met with patient at bedside. He is interested in outpatient PT through [facility] in [place], [place]. Attempted to call office at [phone] multiple times but was unable to get through. Faxed orders to [phone] and will include instructions for patient to call them on the AVS if he is not contacted within a couple of days. Patient has a relative that works at that office. No other discharge needs identified or noted by patient. Jina Llanes RN [phone]              Discharge Codes     None        Expected Discharge Date and Time     Expected Discharge Date Expected Discharge Time    Feb 1, 2018             Jina Llanes RN

## 2018-02-01 NOTE — DISCHARGE PLACEMENT REQUEST
"Jina Llanes RN  Case Management  P: [phone]      Denzel Harding (57 y.o. Male)     Date of Birth Social Security Number Address Home Phone MRN    1960  [address] [address] [phone] [id number]    [facility] Marital Status          None        Admission Date Admission Type Admitting Provider Attending Provider Department, Room/Bed    18 Emergency Vignesh Gonzalez MD Dossett, Lee M, MD [address], S[address]/1    Discharge Date Discharge Disposition Discharge Destination                      Attending Provider: Vignesh Gonzalez MD     Allergies:  Sulfa Antibiotics, Codeine, Invokana [Canagliflozin], Morphine    Isolation:  None   Infection:  None   Code Status:  FULL    Ht:  177.8 cm (70\")   Wt:  124 kg (273 lb 9.6 oz)    Admission Cmt:  None   Principal Problem:  Headache syndrome, complicated [G44.59]                 Active Insurance as of 2018     Primary Coverage     Payor Plan Insurance Group Employer/Plan Group    ANTHEM MEDICARE REPLACEMENT ANTHEM MEDICARE ADVANTAGE [id number]     Payor Plan Address Payor Plan Phone Number Effective From Effective To    [address] [phone] 2016     [place] [address]       Subscriber Name Subscriber Birth Date Member ID       DENZEL HARDING 1960 [id number]                 Emergency Contacts      (Rel.) Home Phone Work Phone Mobile Phone    Carol Harding (Spouse) [phone] -- --          [address] [address]  Phone:  [phone]  Fax:   Date: 2018      Ambulatory Referral to Physical Therapy Evaluate and treat     Patient:  Denzel Harding MRN:  [id number]   [address] [facility] [address] :  1960  SSN:    Phone: [phone] Sex:  M      INSURANCE PAYOR PLAN GROUP # SUBSCRIBER ID   Primary: ANTHEM MEDICARE REPLACEMENT [id number]      Referring Provider " Information:  VIGNESH BROWN Phone: [phone] Fax:       Referral Information:   # Visits:  1 Referral Type: Therapy [AE1]   Urgency:  Routine Referral Reason: Specialty Services Required   Start Date: 2018 End Date:  To be determined by Insurer   Diagnosis: Acute anterior circulation TIA (G45.8 [ICD-10-CM] 435.9 [ICD-9-CM])  Impaired functional mobility, balance, gait, and endurance (Z74.09 [ICD-10-CM] V49.89 [ICD-9-CM])  Cerebrovascular accident (CVA), unspecified mechanism (I63.9 [ICD-10-CM] 434.91 [ICD-9-CM])      Refer to Dept:   Refer to Provider:   Refer to Facility:       Specialty modality needed? Evaluate and treat     This document serves as a request of services and does not constitute Insurance authorization or approval of services.  To determine eligibility, please contact the members Insurance carrier to verify and review coverage.     If you have medical questions regarding this request for services. Please contact [address] at [phone] between the hours of 8:00am - 5:00pm (Mon-Fri).             Verbal Order Mode: Telephone with readback   Authorizing Provider: Vignesh Brown MD  Authorizing Provider's NPI: [id number]     Order Entered By: Jina Llanes RN 2018  1:15 PM                     Physician Progress Notes (most recent note)      ANTONIO Arnold at 2018  2:30 PM  Version 1 of 1             [facility]  PROGRESS NOTE    Patient Name: Denzel Harding  : 1960  MRN: [id number]    Date of Admission: 2018  Length of Stay: 3  Primary Care Physician: Ottoniel Toledo MD    Subjective   Subjective     CC:  F/u HA and left sided weakness    HPI:    Patient seen this afternoon after receiving a dose of indomethacin.  He reports that the pain is no better after starting this medication.  He reports nausea that has recently begun again.  He still reports a sharp shooting pain above his right eye and left sided  weakness.  He also reports some scalp tenderness in the hairline above his right eye.  He also reports blurry vision I witnessed him walking with PT and he became weak, his knees buckled and he reports feeling dizzy.  He is frustrated because his pain has not improved at all.       Review of Systems   Constitutional: Positive for fatigue. Negative for fever.   Eyes: Positive for visual disturbance (blurry vision). Negative for photophobia.   Respiratory: Negative for shortness of breath.    Cardiovascular: Negative for chest pain.   Gastrointestinal: Positive for nausea. Negative for abdominal pain and vomiting.   Neurological: Positive for dizziness, weakness, numbness and headaches.   All other systems reviewed and are negative.      Otherwise ROS is negative except as mentioned in the HPI.    Objective   Objective     Vital Signs:   Temp:  [97.4 °F (36.3 °C)-98 °F (36.7 °C)] 97.4 °F (36.3 °C)  Heart Rate:  [73-89] 77  Resp:  [18] 18  BP: (136-140)/(79-80) 140/79  Total (NIH Stroke Scale): 1     Physical Exam:  Constitutional: No acute distress when resting in bed, awake, alert, mildly uncomfortable with activity  HENT: NCAT, mucous membranes moist  Respiratory: Clear to auscultation bilaterally, respiratory effort normal on room air  Cardiovascular: RRR, no murmurs, rubs, or gallops  Gastrointestinal: Positive bowel sounds, soft, nontender, nondistended, obese  Musculoskeletal: No bilateral ankle edema  Psychiatric: Appropriate affect, cooperative  Neurologic: Oriented x 3, strength intact and equal bilaterally  Skin: No rashes      Results Reviewed:  I have personally reviewed current lab, radiology, and data and agree.      Results from last 7 days  Lab Units 01/29/18  0500 01/28/18  1558 01/28/18  1404   WBC 10*3/mm3 6.43 6.73  --    HEMOGLOBIN g/dL 13.0* 13.6  --    HEMATOCRIT % 39.0 40.2  --    PLATELETS 10*3/mm3 160 187  --    INR   --  0.97 1.1       Results from last 7 days  Lab Units 01/29/18  0500  01/28/18  1558   SODIUM mmol/L 136 134   POTASSIUM mmol/L 3.6 3.7   CHLORIDE mmol/L 103 99   CO2 mmol/L 23.0 27.0   BUN mg/dL 12 14   CREATININE mg/dL 0.70 0.90   GLUCOSE mg/dL 164* 354*   CALCIUM mg/dL 8.9 9.1   ALT (SGPT) U/L  --  36   AST (SGOT) U/L  --  25     Estimated Creatinine Clearance: 155.7 mL/min (by C-G formula based on Cr of 0.7).  No results found for: BNP  No results found for: PHART    Microbiology Results Abnormal     None          Imaging Results (last 24 hours)     ** No results found for the last 24 hours. **        Results for orders placed during the hospital encounter of 01/28/18   Adult Transthoracic Echo Complete W/ Cont if Necessary Per Protocol    Narrative · Left ventricular systolic function is normal.  · Estimated EF appears to be in the range of 56 - 60%  · Left ventricular diastolic dysfunction (grade II) consistent with   pseudonormalization.  · Left ventricular wall thickness is consistent with moderate concentric   hypertrophy.  · Left atrial cavity size is mildly dilated.  · Technically limited bubble study.          I have reviewed the medications.    Assessment/Plan   Assessment / Plan     Hospital Problem List     * (Principal)Headache syndrome, complicated    Coronary arteriosclerosis in native artery    Overview Addendum 1/4/2018  2:04 PM by Darrius Melo, APRN     1. [facility] 8-11-17:  · Severe three-vessel coronary artery disease  · Preserved global and regional left ventricular systolic function  · Elevated left ventricular filling pressures consistent with diastolic heart failure.  2. CABG x 3 (8-15-17):         Seizure disorder    Hypertension    History of CVA (cerebrovascular accident)    Type 2 diabetes mellitus             Brief Hospital Course to date:  Denzel Harding is a 57 y.o. male with a history of coronary artery disease status post CABG in August 2017, possible stroke in December status post TPA but with negative MRI, uncontrolled diabetes who presented to the  emergency room with complains of headache and left-sided weakness.  Stroke protocol was initiated and MRI was negative.      Assessment & Plan:  - MD d/w Dr. Menjivar, not felt to be TIA/CVA.  Likely related to complex HA.  - trial of depacon started , was discontinued per neurology  - he was also trialed on scheduled toradol with only minimal improvement.  This was discontinued this afternoon and indomethacin was started.  He doesn't feel that this has helped either.    -Per Dr. Menjivar, he doesn't have much else to offer him on an inpatient basis.  He recommends follow up in neurology office at discharge.  -I will give the patient a one time dose of solumedrol, along with IV phenergan and ultram to see if this helps break the cycle.    - Monitor blood sugars on current regimen.    - continue statin, ASA  - other home meds resumed as appropriate    DVT Prophylaxis:  heparin    CODE STATUS: Full Code    Disposition: I expect the patient to be discharged home in 1-2 days.    ANTONIO Arnold  18  5:24 PM           Electronically signed by ANTONIO Arnold at 2018  5:32 PM           Physical Therapy Notes (most recent note)      Alysha Zee, PT at 2018  4:04 PM  Version 1 of 1         Acute Care - Physical Therapy Treatment Note   Dani     Patient Name: Denzel Harding  : 1960  MRN: [id number]  Today's Date: 2018  Onset of Illness/Injury or Date of Surgery Date: 18  Date of Referral to PT: 18  Referring Physician: ANTONIO GASTON    Admit Date: 2018    Visit Dx:    ICD-10-CM ICD-9-CM   1. Acute anterior circulation TIA G45.8 435.9   2. Impaired mobility and ADLs Z74.09 799.89   3. Impaired functional mobility, balance, gait, and endurance Z74.09 V49.89     Patient Active Problem List   Diagnosis   • Coronary arteriosclerosis in native artery   • Lumbar radiculopathy   • Hypercholesterolemia   • Diabetic polyneuropathy associated with type 2 diabetes mellitus   •  "Migraine with aura and with status migrainosus   • Palpitations   • Seizure disorder   • GERD (gastroesophageal reflux disease)   • Brachial neuritis   • Cervical radiculopathy   • LOM (loss of memory)   • Anxiety   • Diabetes mellitus   • Lower back pain   • Hypertension   • History of CVA (cerebrovascular accident)   • Non morbid obesity due to excess calories   • Post-infarction pericarditis   • HCAP (healthcare-associated pneumonia)   • CVA (S/P tPA)   • Acute bronchitis   • Type 2 diabetes mellitus   • Headache syndrome, complicated               Adult Rehabilitation Note       01/31/18 1504          Rehab Assessment/Intervention    Discipline physical therapist  -CD      Document Type therapy note (daily note)  -CD      Subjective Information agree to therapy;complains of   FEELING \"DRUNK\". REPORTS EXTREME DIZZINESS EARLIER.   -CD      Symptoms Noted During/After Treatment dizziness  -CD      Symptoms Noted Comment PT REPORTS EARLIER EPISODE OF \"EXTREME DIZZINESS\" UPON BENDING OVER EARLIER. WHILE AMBULATING IN WOO WITH P.T. REPORTS KNEES WANTING TO GIVE OUT AND FEELING DIZZINESS. NSG NOTIFIED OF THESE EPISODES AND GIVEN VITALS. ALSO DISCUSSED WITH PA.   -CD      Precautions/Limitations fall precautions  -CD      Recorded by [CD] Alysha Zee, PT      Vital Signs    Pre Systolic BP Rehab 158  -CD      Pre Treatment Diastolic BP 90  -CD      Intra Systolic BP Rehab 139  -CD      Intra Treatment Diastolic BP 80  -CD      Post Systolic BP Rehab 154  -CD      Post Treatment Diastolic BP 97  -CD      Pretreatment Heart Rate (beats/min) 80  -CD      Intratreatment Heart Rate (beats/min) 87  -CD      Posttreatment Heart Rate (beats/min) 82  -CD      Pre SpO2 (%) 96  -CD      O2 Delivery Pre Treatment room air  -CD      Post SpO2 (%) 96  -CD      O2 Delivery Post Treatment room air  -CD      Recorded by [CD] Alysha Zee, PT      Pain Assessment    Pain Assessment 0-10  -CD      Pain Score 6  -CD      Post Pain " "Score 6  -CD      Pain Type Acute pain  -CD      Pain Location Head  -CD      Pain Orientation Left  -CD      Recorded by [CD] Alysha Zee, PT      Bed Mobility, Assessment/Treatment    Bed Mobility, Scoot/Bridge, [place] independent  -CD      Bed Mob, Supine to Sit, [place] independent  -CD      Recorded by [CD] Alysha Zee, PT      Transfer Assessment/Treatment    Transfers, Sit-Stand [place] contact guard assist  -CD      Transfers, Stand-Sit [place] contact guard assist   FOR SAFETY DUE TO EARLIER EPISODES OF DIZZINESS.   -CD      Transfer, Impairments other (see comments)   REPORTS DIZZINESS UPON STANDING AND DURING GAIT.   -CD      Recorded by [CD] Alysha Zee, PT      Gait Assessment/Treatment    Gait, [place] Level contact guard assist  -CD      Gait, Assistive Device rolling walker  -CD      Gait, Distance (Feet) 350  -CD      Gait, Gait Deviations step length decreased;knee buckling   BRIEF EPISODE OF KNEES BUCKLING BUT SELF CORRECTED  -CD      Gait, Impairments impaired balance  -CD      Gait, Comment PT REPORTS A GENERALIZED \"DRUNK\" FEELING WITH GAIT, MILD DIZZINESS AND ONE EPISODE OF KNEES BUCKLING.  -CD      Recorded by [CD] Alysha Zee, PT      Motor Skills/Interventions    Additional Documentation Balance Skills Training (Group)  -CD      Recorded by [CD] Alysha Zee, PT      Balance Skills Training    Sitting-Level of Assistance Independent  -CD      Standing-Level of Assistance Contact guard  -CD      Static Standing Balance Support assistive device  -CD      Gait Balance-Level of Assistance Contact guard  -CD      Gait Balance Support assistive device  -CD      Recorded by [CD] Alysha Zee, PT      Positioning and Restraints    Pre-Treatment Position in bed  -CD      Post Treatment Position bed  -CD      In Bed supine;call light within reach;encouraged to call for assist;notified nsg;exit alarm on  -CD      Recorded by [CD] Alysha Zee, PT        User Rosenberg  " (r) = Recorded By, (t) = Taken By, (c) = Cosigned By    Initials Name Effective Dates    WAQAS Zee, PT 06/19/15 -                 IP PT Goals       01/31/18 1600 01/29/18 1202       Gait Training PT LTG    Gait Training Goal PT LTG, Time to Achieve  3 days  -CD     Gait Training Goal PT LTG, [place] Level  independent  -CD     Gait Training Goal PT LTG, Assist Device  cane, straight  -CD     Gait Training Goal PT LTG, Distance to Achieve  600  -CD     Gait Training Goal PT LTG, Outcome goal ongoing  -CD      Stair Training PT LTG    Stair Training Goal PT LTG, Time to Achieve  3 days  -CD     Stair Training Goal PT LTG, Number of Steps  12  -CD     Stair Training Goal PT LTG, [place] Level  independent  -CD     Stair Training Goal PT LTG, Assist Device  1 handrail  -CD     Stair Training Goal PT LTG, Outcome goal ongoing  -CD        User Key  (r) = Recorded By, (t) = Taken By, (c) = Cosigned By    Initials Name Provider Type    WAQAS Zee, PT Physical Therapist          Physical Therapy Education     Title: PT OT SLP Therapies (Done)     Topic: Physical Therapy (Done)     Point: Mobility training (Done)    Learning Progress Summary    Learner Readiness Method Response Comment Documented by Status   Patient Acceptance E VU,NR SAFETY WITH MOBILITY, PROGRESSION OF POC.  01/31/18 1559 Done    Acceptance E VU S&S CVA, F.A.S.T., SAFETY WITH MOBILITY, REC'S FOR CANE AND OPPT AT D/C.  01/29/18 1201 Done               Point: Home exercise program (Done)    Learning Progress Summary    Learner Readiness Method Response Comment Documented by Status   Patient Acceptance E VU,NR SAFETY WITH MOBILITY, PROGRESSION OF POC.  01/31/18 1559 Done    Acceptance E VU S&S CVA, F.A.S.T., SAFETY WITH MOBILITY, REC'S FOR CANE AND OPPT AT D/C.  01/29/18 1201 Done               Point: Body mechanics (Done)    Learning Progress Summary    Learner Readiness Method Response Comment Documented by Status   Patient  "Acceptance E VU,NR SAFETY WITH MOBILITY, PROGRESSION OF POC.  01/31/18 1559 Done    Acceptance E VU S&S CVA, F.A.S.T., SAFETY WITH MOBILITY, REC'S FOR CANE AND OPPT AT D/C.  01/29/18 1201 Done               Point: Precautions (Done)    Learning Progress Summary    Learner Readiness Method Response Comment Documented by Status   Patient Acceptance E VU,NR SAFETY WITH MOBILITY, PROGRESSION OF POC.  01/31/18 1559 Done    Acceptance E VU S&S CVA, F.A.S.T., SAFETY WITH MOBILITY, REC'S FOR CANE AND OPPT AT D/C. CD 01/29/18 1201 Done                      User Key     Initials Effective Dates Name Provider Type Discipline     06/19/15 -  Alysha Zee, PT Physical Therapist PT                    PT Recommendation and Plan  Anticipated Equipment Needs At Discharge:  (RECOMMEND PT USE CANE IN R HAND AT THIS TIME. )  Anticipated Discharge Disposition: home, home with outpatient services (MIGHT CONSIDER OPPT FOR BALANCE/ L LE STRENGTH AT D/C. )  Planned Therapy Interventions: balance training, gait training, strengthening, stair training  PT Frequency: daily  Plan of Care Review  Plan Of Care Reviewed With: patient  Progress: no change  Outcome Summary/Follow up Plan: PLAN WAS TO GAIT TRAIN WITH CANE FOR INCREASED STABILITY WITH GAIT BUT DUE TO PT C/O DIZZINESS, GENERALIZED FEELING OF BEING \"DRUNK\" OPTED TO USE R WALKER. PT CONTINUES WITH UNSTEADY GAIT, HA  AND NOW WITH C/O DIZZINESS.           Outcome Measures       01/31/18 1504 01/29/18 1125 01/29/18 0715    How much help from another person do you currently need...    Turning from your back to your side while in flat bed without using bedrails? 4  -CD 4  -CD     Moving from lying on back to sitting on the side of a flat bed without bedrails? 4  -CD 4  -CD     Moving to and from a bed to a chair (including a wheelchair)? 3  -CD 4  -CD     Standing up from a chair using your arms (e.g., wheelchair, bedside chair)? 3  -CD 4  -CD     Climbing 3-5 steps with a railing? 3 "  -CD 3  -CD     To walk in hospital room? 3  -CD 3  -CD     AM-PAC 6 Clicks Score 20  -CD 22  -CD     How much help from another is currently needed...    Putting on and taking off regular lower body clothing?   4  -ST    Bathing (including washing, rinsing, and drying)   4  -ST    Toileting (which includes using toilet bed pan or urinal)   4  -ST    Putting on and taking off regular upper body clothing   4  -ST    Taking care of personal grooming (such as brushing teeth)   4  -ST    Eating meals   4  -ST    Score   24  -ST    Modified [place] Scale    Modified [place] Scale 3 - Moderate disability.  Requiring some help, but able to walk without assistance.  -CD 3 - Moderate disability.  Requiring some help, but able to walk without assistance.  -CD 1 - No significant disability despite symptoms.  Able to carry out all usual duties and activities.  -ST    Functional Assessment    Outcome Measure Options AM-PAC 6 Clicks Basic Mobility (PT);Modified [place]  -CD AM-PAC 6 Clicks Basic Mobility (PT);Modified [place]  -CD AM-PAC 6 Clicks Daily Activity (OT);Modified [place]  -ST      User Key  (r) = Recorded By, (t) = Taken By, (c) = Cosigned By    Initials Name Provider Type    CD Alysha Zee, PT Physical Therapist    ST Soo Johnson OTR Occupational Therapist           Time Calculation:         PT Charges       01/31/18 1603          Time Calculation    Start Time 1504  -CD      PT Received On 01/31/18  -CD      PT Goal Re-Cert Due Date 02/08/18  -CD      Time Calculation- PT    Total Timed Code Minutes- PT 36 minute(s)  -CD        User Key  (r) = Recorded By, (t) = Taken By, (c) = Cosigned By    Initials Name Provider Type    WAQAS Zee, PT Physical Therapist          Therapy Charges for Today     Code Description Service Date Service Provider Modifiers Qty    02893598136 HC GAIT TRAINING EA 15 MIN 1/31/2018 Alysha Zee, PT GP 1    04529805954 HC PT THER PROC EA 15 MIN 1/31/2018 Alysha Zee, PT GP 2           PT G-Codes  Outcome Measure Options: AM-PAC 6 Clicks Basic Mobility (PT), Modified [place]  Score: 3  Functional Limitation: Mobility: Walking and moving around  Mobility: Walking and Moving Around Current Status (): At least 40 percent but less than 60 percent impaired, limited or restricted  Mobility: Walking and Moving Around Goal Status (): At least 1 percent but less than 20 percent impaired, limited or restricted    Alysha Zee, PT  1/31/2018          Electronically signed by Alysha Zee, PT at 1/31/2018  4:04 PM

## 2018-02-01 NOTE — DISCHARGE SUMMARY
[facility] Medicine Services  DISCHARGE SUMMARY    Patient Name: Denzel Harding  : 1960  MRN: [id number]    Date of Admission: 2018  Date of Discharge:  18  Primary Care Physician: Ottoniel Toledo MD    Consults     Date and Time Order Name Status Description    2018 1944 Inpatient Consult to Neurology Completed         Hospital Course     Presenting Problem:   Acute anterior circulation TIA [G45.8]    Active Hospital Problems (** Indicates Principal Problem)    Diagnosis Date Noted   • **Headache syndrome, complicated [G44.59] 2018     Priority: Medium   • Type 2 diabetes mellitus [E11.9] 2018   • History of CVA (cerebrovascular accident) [Z86.73] 08/15/2017   • Hypertension [I10] 2017   • Seizure disorder [G40.909] 2017   • Coronary arteriosclerosis in native artery [I25.10] 2016      Resolved Hospital Problems    Diagnosis Date Noted Date Resolved   No resolved problems to display.          Hospital Course:  Denzel Harding is a 57 y.o. male with a past medical history of diabetes, coronary artery disease, prior stroke, hypertension, history of migraines who presented to the hospital with complaints of left-sided numbness and weakness.  Began 3-4 days prior to presentation.  He described as an ice pick stabbing him in his right frontal region.  This was associated with some dizziness, gait imbalance, left-sided numbness.  He was admitted to the hospitalist service to rule out an acute stroke.  An MRI was done which did not show any evidence of acute ischemia or any other abnormalities.  A CTA of his head and neck showed some questionable stenosis in the M2 segments bilaterally however neurology did not feel this to be significant.  His health is knowledgeable symptoms related to complicated headache.  He was trialed on Depacon however this did not improve it much.  The day prior to discharge she was started on Indocin which did help  "along with a dose of IV steroids.  This morning the ice pick sensation has resolved and he is left with a dull headache which is much improved from presentation.  Plan is to discharge a short course of indomethacin along with a Medrol Dosepak.  Neurology like him to follow-up in the neurology clinic in approximately one month.  Patient understands that the steroids will worsen his glucose control in the very short-term.             Day of Discharge     HPI:   He is doing better today.  The \"icepick\" is gone, still with low grade HA.  No other issues.  Feels up to going home today.    Review of Systems  Gen- No fevers, chills  CV- No chest pain, palpitations  Resp- No cough, dyspnea  GI- No N/V/D, abd pain  +HA    Otherwise ROS is negative except as mentioned in the HPI.    Vital Signs:   Temp:  [97.2 °F (36.2 °C)-97.9 °F (36.6 °C)] 97.9 °F (36.6 °C)  Heart Rate:  [74-79] 74  Resp:  [16-18] 16  BP: (140-144)/(79-80) 144/79     Physical Exam:  Constitutional: No acute distress, awake, alert  HENT: NCAT, mucous membranes moist  Respiratory: Clear to auscultation bilaterally, respiratory effort normal   Cardiovascular: RRR, no murmurs, rubs, or gallops, palpable pedal pulses bilaterally  Gastrointestinal: Positive bowel sounds, soft, nontender, nondistended, obese  Musculoskeletal: No bilateral ankle edema  Psychiatric: Appropriate affect, cooperative  Neurologic: Oriented x 3, no focal deficits  Skin: No rashes      Pertinent  and/or Most Recent Results       Results from last 7 days  Lab Units 01/29/18  0500 01/28/18  1558   WBC 10*3/mm3 6.43 6.73   HEMOGLOBIN g/dL 13.0* 13.6   HEMATOCRIT % 39.0 40.2   PLATELETS 10*3/mm3 160 187   SODIUM mmol/L 136 134   POTASSIUM mmol/L 3.6 3.7   CHLORIDE mmol/L 103 99   CO2 mmol/L 23.0 27.0   BUN mg/dL 12 14   CREATININE mg/dL 0.70 0.90   GLUCOSE mg/dL 164* 354*   CALCIUM mg/dL 8.9 9.1       Results from last 7 days  Lab Units 01/28/18  1558 01/28/18  1404   BILIRUBIN mg/dL 0.6  --  "   ALK PHOS U/L 94  --    ALT (SGPT) U/L 36  --    AST (SGOT) U/L 25  --    PROTIME Seconds 10.6 13.1   INR  0.97 1.1   APTT seconds 27.3  --        Results from last 7 days  Lab Units 01/29/18  0500   CHOLESTEROL mg/dL 100   TRIGLYCERIDES mg/dL 110   HDL CHOL mg/dL 25*   LDL CHOL mg/dL 65       Results from last 7 days  Lab Units 01/29/18  0500   HEMOGLOBIN A1C % 9.60*     Brief Urine Lab Results  (Last result in the past 365 days)      Color   Clarity   Blood   Leuk Est   Nitrite   Protein   CREAT   Urine HCG        01/28/18 1603 Yellow Clear Negative Negative Negative Negative               Microbiology Results Abnormal     None          Imaging Results (all)     Procedure Component Value Units Date/Time    CT Angiogram Head With & Without Contrast [[id number]] Collected:  01/28/18 1605     Updated:  01/28/18 1654    Narrative:       EXAMINATION: CT ANGIOGRAM NECK W/WO CONTRAST, CT ANGIOGRAM HEAD W/WO  CONTRAST - 01/28/2018      INDICATION: Code 19.      TECHNIQUE: CTA datasets of the neck and head were performed with  intravenous contrast.     2D reconstructed reformatted datasets were formulated at the  workstation.     The radiation dose reduction device was turned on for each scan per the  ALARA (As Low as Reasonably Achievable) protocol.     COMPARISON: None.     FINDINGS:   1. The common carotid arteries are well-perfused and patent without  stenosis or dissection.     Patient has bilateral calcified dense plaque at the bifurcations of both  carotid systems, however, this does not produce significant or  high-grade stenosis or lumen compromise.     Internal carotid arteries are otherwise normal. There are well perfused.  The petrous and cavernous carotid systems are intact and within normal  limits.     2. Internal carotid terminus is bilaterally normal. The multiple views  of the cerebral circulation demonstrate mild plaque in the M2 segment of  the MCA vessels bilaterally. This is more severe on the right  than left  and produces lumen stenosis, however, there is no major branch occlusion  or dissection. The KHUSHBU vascular territory is within normal limits and  well perfused. The sylvian vessels are normal.     3. The vertebral basilar confluence is intact. Basilar artery and  basilar summit are normal. Posterior cerebral circulation is  unremarkable.       Impression:          Stenotic plaque is noted in the M2 segments of both MCA vessels  bilaterally. This is slightly worse on the right than left and produces  moderate stenosis.     Otherwise, critical MCA stenosis, dissection, or major branch occlusion  is not identified.     No other significant atherosclerotic disease is identified in the head.     Densely calcified plaques are identified in the carotid bifurcations  bilaterally, however, compromise of the lumen or high-grade focal  stenosis is not appreciated and there is no dissection. The upper  vertebral arteries are densely calcified but the vertebral basilar  confluence is intact and the posterior fossa circulation is otherwise  unremarkable.     DICTATED:     01/28/2018  EDITED    :     01/28/2018         This report was finalized on 1/28/2018 4:52 PM by Dr. Archie Castañeda MD.       CT Angiogram Neck With & Without Contrast [[id number]] Collected:  01/28/18 1605     Updated:  01/28/18 1654    Narrative:       EXAMINATION: CT ANGIOGRAM NECK W/WO CONTRAST, CT ANGIOGRAM HEAD W/WO  CONTRAST - 01/28/2018      INDICATION: Code 19.      TECHNIQUE: CTA datasets of the neck and head were performed with  intravenous contrast.     2D reconstructed reformatted datasets were formulated at the  workstation.     The radiation dose reduction device was turned on for each scan per the  ALARA (As Low as Reasonably Achievable) protocol.     COMPARISON: None.     FINDINGS:   1. The common carotid arteries are well-perfused and patent without  stenosis or dissection.     Patient has bilateral calcified dense plaque at the  bifurcations of both  carotid systems, however, this does not produce significant or  high-grade stenosis or lumen compromise.     Internal carotid arteries are otherwise normal. There are well perfused.  The petrous and cavernous carotid systems are intact and within normal  limits.     2. Internal carotid terminus is bilaterally normal. The multiple views  of the cerebral circulation demonstrate mild plaque in the M2 segment of  the MCA vessels bilaterally. This is more severe on the right than left  and produces lumen stenosis, however, there is no major branch occlusion  or dissection. The KHUSHBU vascular territory is within normal limits and  well perfused. The sylvian vessels are normal.     3. The vertebral basilar confluence is intact. Basilar artery and  basilar summit are normal. Posterior cerebral circulation is  unremarkable.       Impression:          Stenotic plaque is noted in the M2 segments of both MCA vessels  bilaterally. This is slightly worse on the right than left and produces  moderate stenosis.     Otherwise, critical MCA stenosis, dissection, or major branch occlusion  is not identified.     No other significant atherosclerotic disease is identified in the head.     Densely calcified plaques are identified in the carotid bifurcations  bilaterally, however, compromise of the lumen or high-grade focal  stenosis is not appreciated and there is no dissection. The upper  vertebral arteries are densely calcified but the vertebral basilar  confluence is intact and the posterior fossa circulation is otherwise  unremarkable.     DICTATED:     01/28/2018  EDITED    :     01/28/2018         This report was finalized on 1/28/2018 4:52 PM by Dr. Archie Castañeda MD.       CT Cerebral Perfusion With & Without Contrast [[id number]] Collected:  01/28/18 1436     Updated:  01/28/18 1654    Narrative:       EXAMINATION: CT CEREBRAL PERFUSION W/WO CONTRAST - 01/28/2018     INDICATION: Follow-up code 19, change in  neurologic status. Evaluate for  stroke.     TECHNIQUE: Cerebral perfusion analysis was performed using computed  tomography with contrast administration including post processing of  parametric maps with determination of cerebral blood flow, cerebral  blood volume, mean transit time and time to drain.     The radiation dose reduction device was turned on for each scan per the  ALARA (As Low as Reasonably Achievable) protocol.     COMPARISON: Comparison is made to previous CT cerebral perfusion  datasets of 6 weeks ago, 12/17/2017.     FINDINGS:   1. There is no evidence of evolving core infarct.     2. There is no evidence of salvageable ischemic brain. There is no  discordance between cerebral blood flow and cerebral blood volume  parametric maps.     3. There is no evidence of delay in mean transit time or time to drain  in a major vascular territory.       Impression:          Negative CT cerebral perfusion datasets. No evidence of acute evolving  infarct or acute ischemic injury is seen and no evidence of salvageable  ischemic brain is currently noted.     Further, there has been no change since the negative examination of 6  weeks ago.     DICTATED:     01/28/2018  EDITED    :     01/28/2018         This report was finalized on 1/28/2018 4:52 PM by Dr. Archie Castañeda MD.       CT Head Without Contrast [[id number]] Collected:  01/28/18 1650     Updated:  01/28/18 1658    Narrative:       EXAMINATION: CT HEAD WO CONTRAST - 01/28/2018     INDICATION: Code 19 left-sided weakness. Evaluate for stroke.     TECHNIQUE: CT data set of the brain was performed without intravenous  contrast.     The radiation dose reduction device was turned on for each scan per the  ALARA (As Low as Reasonably Achievable) protocol.     COMPARISON: Compared to previous and most recent CT datasets of the  brain of one month ago, 12/18/2017.     FINDINGS:   1. Datasets through the brain are negative. There is no evolving  infarct, mass or  low-attenuation abnormality. Hemorrhage or edema is not  appreciated.     2. There is no extracerebral collection, edema, mass or midline shift.     3. There is no hyperdense clot sign. Basal cisterns are clear.     4. The paranasal sinuses are essentially negative for high-grade  sinusitis. There is only mild mucosal thickening in the left frontal and  ethmoids.     Calvarium is intact.       Impression:       Negative CT data set of the brain. Evidence of new or acute  ischemic insult, hemorrhage or edema is not identified and no interval  change has occurred since the appearance of the brain from 12/18/2017,  approximately 6 weeks ago.     Datasets were complete at 1:54 PM and the report rendered at 2:03 PM.     DICTATED:     01/28/2018  EDITED    :     01/28/2018      This report was finalized on 1/28/2018 4:56 PM by Dr. Archie Castañeda MD.       MRI Brain Without Contrast [[id number]] Collected:  01/28/18 1705     Updated:  01/28/18 1810    Narrative:       EXAMINATION: MRI BRAIN WO CONTRAST - 01/28/2018     INDICATION: Left-sided weakness.     TECHNIQUE: MR datasets of the brain were performed without intravenous  contrast.     COMPARISON: Comparison to CT cerebral perfusion datasets and CT datasets  of the brain performed earlier today., Also compared to previous MR  datasets of 6 weeks ago, 12/18/2017.     FINDINGS:   1. Cervical medullary junction is clear. Midline structures and  pituitary gland are normal.     2. There is mild paranasal sinus congestion and there is minimal left  mastoid fluid. The flow voids are normal.     3. There is mild convexity atrophy without central atrophy. There is  mild microangiopathy in the white matter on FLAIR datasets. T1 datasets  are negative for hemorrhage or extracerebral collection. Edema or mass  is not identified and there is no midline shift.     4. The diffusion-weighted sequence is negative. Evidence of acute  ischemic insult or acute restricted diffusion is not  currently  identified. The ADC mapping exam is negative.       Impression:          Negative MR datasets of the brain for acute focal abnormality. There is  no restricted diffusion or acute evolving ischemic insult. There is no  edema, hemorrhage, mass or midline shift.     The patient does have some microangiopathy in the white matter which is  chronic.     DICTATED:     01/28/2018  EDITED    :     01/28/2018          This report was finalized on 1/28/2018 6:08 PM by Dr. Archie Castañeda MD.             Results for orders placed during the hospital encounter of 12/17/17   Bilateral Carotid Duplex    Narrative · Proximal right internal carotid artery plaque without significant   stenosis.  · Right internal carotid artery stenosis of 0-49%.  · Proximal left internal carotid artery plaque without significant   stenosis.  · Left internal carotid artery stenosis of 0-49%.          Results for orders placed during the hospital encounter of 12/17/17   Bilateral Carotid Duplex    Narrative · Proximal right internal carotid artery plaque without significant   stenosis.  · Right internal carotid artery stenosis of 0-49%.  · Proximal left internal carotid artery plaque without significant   stenosis.  · Left internal carotid artery stenosis of 0-49%.          Results for orders placed during the hospital encounter of 01/28/18   Adult Transthoracic Echo Complete W/ Cont if Necessary Per Protocol    Narrative · Left ventricular systolic function is normal.  · Estimated EF appears to be in the range of 56 - 60%  · Left ventricular diastolic dysfunction (grade II) consistent with   pseudonormalization.  · Left ventricular wall thickness is consistent with moderate concentric   hypertrophy.  · Left atrial cavity size is mildly dilated.  · Technically limited bubble study.            Discharge Details      Denzel Harding   Home Medication Instructions MARGARITA:[id number]    Printed on:02/01/18 8643   Medication Information                       albuterol (PROVENTIL HFA;VENTOLIN HFA) 108 (90 BASE) MCG/ACT inhaler  Inhale 2 puffs Every 4 (Four) Hours As Needed. ProAir  (90 Base) MCG/ACT Inhalation Aerosol Solution; Patient Sig: ProAir  (90 Base) MCG/ACT Inhalation Aerosol Solution ; 8; 0; 05-Oct-2015; Active             amLODIPine (NORVASC) 5 MG tablet  Take 1 tablet by mouth Daily.             aspirin 325 MG tablet  Take 1 tablet by mouth Daily.             atorvastatin (LIPITOR) 80 MG tablet  Take 1 tablet by mouth Every Night.             Cetirizine HCl 10 MG capsule  Take 1 capsule by mouth daily.             coenzyme Q10 100 MG capsule  Take 100 mg by mouth Daily.             colchicine 0.6 MG tablet  Take 1 tablet by mouth Daily.             famotidine (PEPCID) 20 MG tablet  Take 1 tablet by mouth Every Night.             furosemide (LASIX) 40 MG tablet  Take 40 mg by mouth Daily.             gabapentin (NEURONTIN) 300 MG capsule  Take 1 capsule by mouth 2 (Two) Times a Day.             glucose blood test strip  Use as instructed             HUMALOG KWIKPEN 100 UNIT/ML solution pen-injector  Inject 10 Units under the skin 3 (Three) Times a Day With Meals. Follows [facility] From PCP             hydrOXYzine (ATARAX) 25 MG tablet  Take 2 tablets by mouth Every 6 (Six) Hours As Needed for Itching.             indomethacin (INDOCIN) 50 MG capsule  Take 1 capsule by mouth 3 (Three) Times a Day With Meals.             lisinopril (PRINIVIL,ZESTRIL) 40 MG tablet  Take 1 tablet by mouth daily.             MethylPREDNISolone (MEDROL, EULOGIO,) 4 MG tablet  Take as directed on package instructions.             metoprolol tartrate (LOPRESSOR) 50 MG tablet  Take 1 tablet by mouth Every 12 (Twelve) Hours.             Misc Natural Products (TUMERSAID) tablet  Take 1 tablet by mouth Daily.             montelukast (SINGULAIR) 10 MG tablet  take 1 tablet by mouth at bedtime             omeprazole (PriLOSEC) 20 MG capsule  Take 1 capsule by mouth daily.              potassium chloride (K-DUR,KLOR-CON) 20 MEQ CR tablet  One tablet on Sundays, Tuesdays, and Thursdays             ranolazine (RANEXA) 500 MG 12 hr tablet  Take 1 tablet by mouth 2 (Two) Times a Day.             topiramate (TOPAMAX) 100 MG tablet  Take 100 mg by mouth Daily.             TOUJEO SOLOSTAR 300 UNIT/ML solution pen-injector  Inject 60 Units as directed Daily.             vitamin C (ASCORBIC ACID) 500 MG tablet  Take 500 mg by mouth Daily.             Vortioxetine HBr (TRINTELLIX) 10 MG tablet  Take 10 mg by mouth Daily.                   Discharge Disposition:  Home or Self Care    Discharge Diet:  Diet Instructions     Diet: Regular, Consistent Carbohydrate, Cardiac       Discharge Diet:   Regular  Consistent Carbohydrate  Cardiac                    Discharge Activity:   Activity Instructions     Activity as Tolerated                     No future appointments.    Additional Instructions for the Follow-ups that You Need to Schedule     Ambulatory Referral to Physical Therapy Evaluate and treat    As directed    Specialty modality needed?:  Evaluate and treat           Discharge Follow-up with Specialty: Neuro (Jean/Tomasa) 1 month    As directed    Specialty:  Neuro (Jean/Tomasa) 1 month                     Time Spent on Discharge:  32 minutes    Vignesh Gonzalez MD  02/01/18  2:32 PM

## 2018-02-01 NOTE — PLAN OF CARE
Problem: Patient Care Overview (Adult)  Goal: Plan of Care Review  Outcome: Ongoing (interventions implemented as appropriate)   02/01/18 0347   Coping/Psychosocial Response Interventions   Plan Of Care Reviewed With patient   Patient Care Overview   Progress progress toward functional goals as expected     Goal: Adult Individualization and Mutuality  Outcome: Ongoing (interventions implemented as appropriate)    Goal: Discharge Needs Assessment  Outcome: Ongoing (interventions implemented as appropriate)   02/01/18 0347   Discharge Needs Assessment   Concerns To Be Addressed no discharge needs identified   Readmission Within The Last 30 Days no previous admission in last 30 days   Equipment Needed After Discharge none   Discharge Disposition still a patient   Current Health   Anticipated Changes Related to Illness none   Self-Care   Equipment Currently Used at Home none   Living Environment   Transportation Available car;family or friend will provide       Problem: Stroke (Ischemic) (Adult)  Goal: Signs and Symptoms of Listed Potential Problems Will be Absent or Manageable (Stroke)  Outcome: Ongoing (interventions implemented as appropriate)   02/01/18 0347   Stroke (Ischemic)   Problems Assessed (Stroke (Ischemic)/TIA) all   Problems Present (Stroke (Ischemic)/TIA) other (see comments)  (HEADACHE)

## 2018-02-02 RX ORDER — METOPROLOL TARTRATE 50 MG/1
TABLET, FILM COATED ORAL
Qty: 180 TABLET | Refills: 0 | Status: SHIPPED | OUTPATIENT
Start: 2018-02-02 | End: 2018-03-29 | Stop reason: SDUPTHER

## 2018-02-15 ENCOUNTER — HOSPITAL ENCOUNTER (EMERGENCY)
Facility: HOSPITAL | Age: 58
Discharge: HOME | End: 2018-02-15
Attending: EMERGENCY MEDICINE | Admitting: EMERGENCY MEDICINE

## 2018-02-15 ENCOUNTER — APPOINTMENT (OUTPATIENT)
Dept: GENERAL RADIOLOGY | Facility: HOSPITAL | Age: 58
End: 2018-02-15

## 2018-02-15 VITALS
RESPIRATION RATE: 16 BRPM | HEART RATE: 82 BPM | HEIGHT: 66 IN | BODY MASS INDEX: 45 KG/M2 | WEIGHT: 280 LBS | DIASTOLIC BLOOD PRESSURE: 75 MMHG | SYSTOLIC BLOOD PRESSURE: 137 MMHG | TEMPERATURE: 97.4 F | OXYGEN SATURATION: 97 %

## 2018-02-15 DIAGNOSIS — J11.1 INFLUENZA: Primary | ICD-10-CM

## 2018-02-15 LAB
ALBUMIN SERPL-MCNC: 3.9 G/DL (ref 3.2–4.8)
ALBUMIN/GLOB SERPL: 1.6 G/DL (ref 1.5–2.5)
ALP SERPL-CCNC: 69 U/L (ref 25–100)
ALT SERPL W P-5'-P-CCNC: 30 U/L (ref 7–40)
ANION GAP SERPL CALCULATED.3IONS-SCNC: 4 MMOL/L (ref 3–11)
AST SERPL-CCNC: 22 U/L (ref 0–33)
BACTERIA UR QL AUTO: NORMAL /HPF
BASOPHILS # BLD AUTO: 0.04 10*3/MM3 (ref 0–0.2)
BASOPHILS NFR BLD AUTO: 0.5 % (ref 0–1)
BILIRUB SERPL-MCNC: 0.4 MG/DL (ref 0.3–1.2)
BILIRUB UR QL STRIP: NEGATIVE
BNP SERPL-MCNC: 56 PG/ML (ref 0–100)
BUN BLD-MCNC: 11 MG/DL (ref 9–23)
BUN/CREAT SERPL: 15.7 (ref 7–25)
CALCIUM SPEC-SCNC: 8.9 MG/DL (ref 8.7–10.4)
CHLORIDE SERPL-SCNC: 113 MMOL/L (ref 99–109)
CLARITY UR: CLEAR
CO2 SERPL-SCNC: 25 MMOL/L (ref 20–31)
COLOR UR: YELLOW
CREAT BLD-MCNC: 0.7 MG/DL (ref 0.6–1.3)
D-LACTATE SERPL-SCNC: 1 MMOL/L (ref 0.5–2)
DEPRECATED RDW RBC AUTO: 42.6 FL (ref 37–54)
EOSINOPHIL # BLD AUTO: 0.26 10*3/MM3 (ref 0–0.3)
EOSINOPHIL NFR BLD AUTO: 3.3 % (ref 0–3)
ERYTHROCYTE [DISTWIDTH] IN BLOOD BY AUTOMATED COUNT: 14.6 % (ref 11.3–14.5)
GFR SERPL CREATININE-BSD FRML MDRD: 117 ML/MIN/1.73
GLOBULIN UR ELPH-MCNC: 2.4 GM/DL
GLUCOSE BLD-MCNC: 152 MG/DL (ref 70–100)
GLUCOSE UR STRIP-MCNC: ABNORMAL MG/DL
HCT VFR BLD AUTO: 37.8 % (ref 38.9–50.9)
HGB BLD-MCNC: 12.4 G/DL (ref 13.1–17.5)
HGB UR QL STRIP.AUTO: NEGATIVE
HYALINE CASTS UR QL AUTO: NORMAL /LPF
IMM GRANULOCYTES # BLD: 0.07 10*3/MM3 (ref 0–0.03)
IMM GRANULOCYTES NFR BLD: 0.9 % (ref 0–0.6)
KETONES UR QL STRIP: NEGATIVE
LEUKOCYTE ESTERASE UR QL STRIP.AUTO: ABNORMAL
LYMPHOCYTES # BLD AUTO: 2.04 10*3/MM3 (ref 0.6–4.8)
LYMPHOCYTES NFR BLD AUTO: 25.8 % (ref 24–44)
MCH RBC QN AUTO: 26.1 PG (ref 27–31)
MCHC RBC AUTO-ENTMCNC: 32.8 G/DL (ref 32–36)
MCV RBC AUTO: 79.6 FL (ref 80–99)
MONOCYTES # BLD AUTO: 0.38 10*3/MM3 (ref 0–1)
MONOCYTES NFR BLD AUTO: 4.8 % (ref 0–12)
NEUTROPHILS # BLD AUTO: 5.13 10*3/MM3 (ref 1.5–8.3)
NEUTROPHILS NFR BLD AUTO: 64.7 % (ref 41–71)
NITRITE UR QL STRIP: NEGATIVE
PH UR STRIP.AUTO: 7.5 [PH] (ref 5–8)
PLATELET # BLD AUTO: 141 10*3/MM3 (ref 150–450)
PMV BLD AUTO: 10.3 FL (ref 6–12)
POTASSIUM BLD-SCNC: 3.8 MMOL/L (ref 3.5–5.5)
PROT SERPL-MCNC: 6.3 G/DL (ref 5.7–8.2)
PROT UR QL STRIP: NEGATIVE
RBC # BLD AUTO: 4.75 10*6/MM3 (ref 4.2–5.76)
RBC # UR: NORMAL /HPF
REF LAB TEST METHOD: NORMAL
SODIUM BLD-SCNC: 142 MMOL/L (ref 132–146)
SP GR UR STRIP: 1.01 (ref 1–1.03)
SQUAMOUS #/AREA URNS HPF: NORMAL /HPF
TROPONIN I SERPL-MCNC: 0 NG/ML (ref 0–0.07)
UROBILINOGEN UR QL STRIP: ABNORMAL
WBC NRBC COR # BLD: 7.92 10*3/MM3 (ref 3.5–10.8)
WBC UR QL AUTO: NORMAL /HPF

## 2018-02-15 PROCEDURE — 80053 COMPREHEN METABOLIC PANEL: CPT | Performed by: PHYSICIAN ASSISTANT

## 2018-02-15 PROCEDURE — 99283 EMERGENCY DEPT VISIT LOW MDM: CPT

## 2018-02-15 PROCEDURE — 84484 ASSAY OF TROPONIN QUANT: CPT

## 2018-02-15 PROCEDURE — 83880 ASSAY OF NATRIURETIC PEPTIDE: CPT | Performed by: PHYSICIAN ASSISTANT

## 2018-02-15 PROCEDURE — 93005 ELECTROCARDIOGRAM TRACING: CPT | Performed by: PHYSICIAN ASSISTANT

## 2018-02-15 PROCEDURE — 83605 ASSAY OF LACTIC ACID: CPT | Performed by: PHYSICIAN ASSISTANT

## 2018-02-15 PROCEDURE — 81001 URINALYSIS AUTO W/SCOPE: CPT | Performed by: PHYSICIAN ASSISTANT

## 2018-02-15 PROCEDURE — 85025 COMPLETE CBC W/AUTO DIFF WBC: CPT | Performed by: PHYSICIAN ASSISTANT

## 2018-02-15 PROCEDURE — 71046 X-RAY EXAM CHEST 2 VIEWS: CPT

## 2018-02-15 RX ORDER — OSELTAMIVIR PHOSPHATE 75 MG/1
75 CAPSULE ORAL 2 TIMES DAILY
COMMUNITY
End: 2018-03-02

## 2018-02-15 RX ORDER — AMOXICILLIN AND CLAVULANATE POTASSIUM 875; 125 MG/1; MG/1
1 TABLET, FILM COATED ORAL 2 TIMES DAILY
COMMUNITY
End: 2018-03-02

## 2018-02-15 RX ADMIN — SODIUM CHLORIDE 1000 ML: 9 INJECTION, SOLUTION INTRAVENOUS at 19:36

## 2018-02-15 NOTE — ED PROVIDER NOTES
Subjective   HPI Comments: Denzel Harding is a 57 y.o.male with a history of CHF, COPD, DM, GERD, HTN, EDD, pericardial effusion, stroke who presents to the ED with c/o Influenza and worsening flu symptoms. He states that he tested positive for Influenza and strep throat this week by his PCP who prescribed him Tamiflu and Augmentin. He c/o increasing weakness, myalgias, shortness of breath, and fatigue. He states that his cough has been minimal and that he developed right lower extremity edema several days ago. He also c/o fever, sore throat but denies any other symptoms at this time. He denies IV drug use, recent tattoos, puncture wounds or cuts. He reports that he controls his DM with insulin and he had a CABG with stent placements here at [facility] last year. He denies smoking, drug use or daily ETOH use.  He reports that he was septic in 2013 from bacteremia and was told by his PCP to go to the ED if he ever has a strep infection.       Patient is a 57 y.o. male presenting with influenza.   History provided by:  Patient  Influenza   Presenting symptoms: cough, fatigue, fever, myalgias and sore throat    Presenting symptoms: no diarrhea, no nausea, no rhinorrhea and no vomiting    Severity:  Moderate  Onset quality:  Gradual  Duration:  4 days  Progression:  Worsening  Chronicity:  New  Relieved by:  Nothing  Worsened by:  Nothing  Ineffective treatments:  Prescription medications  Associated symptoms: decreased physical activity    Risk factors: diabetes and heart disease        Review of Systems   Constitutional: Positive for fatigue and fever.   HENT: Positive for sore throat. Negative for rhinorrhea.    Respiratory: Positive for cough. Negative for chest tightness and wheezing.    Gastrointestinal: Negative for abdominal pain, diarrhea, nausea and vomiting.   Genitourinary: Negative for dysuria, frequency, hematuria and urgency.   Musculoskeletal: Positive for myalgias. Negative for back pain.   Neurological:  Negative for dizziness and weakness.   Psychiatric/Behavioral: Negative.    All other systems reviewed and are negative.      Past Medical History:   Diagnosis Date   • Anxiety    • Arthritis    • Asthma    • BiPAP (biphasic positive airway pressure) dependence    • Brachial neuritis 1/19/2017   • Chest pain    • CHF (congestive heart failure)    • COPD (chronic obstructive pulmonary disease)    • Coronary artery disease    • Depression    • Diabetes mellitus    • Dizziness    • Edema    • GERD (gastroesophageal reflux disease)    • H/O chest x-ray 07/04/2015    Mild Left base atelectasis   • H/O echocardiogram 07/05/2015    Normal LVSF. EF of 60-65%. Grade 1 diastolic dysfunction of the LV myocardium. No evidence of pericardial effusion   • H/O exercise stress test    • History of herniated intervertebral disc     History of left L5-S1 disc herniation   • Hypertension    • LOM (loss of memory) 1/19/2017   • Lower back pain     Right   • Measles    • Obstructive sleep apnea    • Palpitations    • Pericardial effusion    • Seizure disorder    • Shortness of breath 1/19/2017   • SOB (shortness of breath)    • Stroke    • Wears glasses        Allergies   Allergen Reactions   • Sulfa Antibiotics Anaphylaxis, Nausea And Vomiting and Delirium   • Codeine Nausea And Vomiting     Can only take with Phenergan   • Invokana [Canagliflozin]      DKA   • Morphine      Does not work. It causes pain       Past Surgical History:   Procedure Laterality Date   • BACK SURGERY     • CARDIAC CATHETERIZATION     • CARDIAC CATHETERIZATION N/A 8/11/2017    Procedure: Coronary angiography;  Surgeon: Dallas Kim MD;  Location: [facility] CATH INVASIVE LOCATION;  Service:    • CARDIAC CATHETERIZATION N/A 8/11/2017    Procedure: Left Heart Cath;  Surgeon: Dallas Kim MD;  Location: [facility] CATH INVASIVE LOCATION;  Service:    • CARDIAC CATHETERIZATION N/A 8/11/2017    Procedure: Left ventriculography;  Surgeon: Dallas Kim MD;   Location: [facility] CATH INVASIVE LOCATION;  Service:    • CARDIOVASCULAR STRESS TEST  07/03/2017    WITH DR HERNANDEZ AT [facility]   • CARPAL TUNNEL RELEASE Right    • CATARACT EXTRACTION W/ INTRAOCULAR LENS IMPLANT Right 6/12/2017    Procedure: CATARACT PHACO EXTRACTION WITH INTRAOCULAR LENS IMPLANT RIGHT ;  Surgeon: Natalie Cao MD;  Location:  [facility];  Service:    • CATARACT EXTRACTION W/ INTRAOCULAR LENS IMPLANT Left 7/10/2017    Procedure: CATARACT PHACO EXTRACTION WITH INTRAOCULAR LENS IMPLANT LEFT;  Surgeon: Natalie Cao MD;  Location: [facility] OR;  Service:    • CHOLECYSTECTOMY     • CHOLECYSTECTOMY     • COLONOSCOPY     • CORONARY ANGIOPLASTY WITH STENT PLACEMENT     • CORONARY ANGIOPLASTY WITH STENT PLACEMENT      X1-LAD   • CORONARY ARTERY BYPASS GRAFT N/A 8/15/2017    Procedure: CORONARY ARTERY BYPASS GRAFTING x 3 UTILIZING THE LEFT INTERNAL MAMMARY ARTERY WITH ENDOSCOPIC VEIN HARVESTING OF THE RIGHT GREATER SAPHENOUS VEIN, HAMLET, LAD ENDARECTOMY;  Surgeon: London Damon MD;  Location:  [facility] OR;  Service:    • ENDOSCOPY     • EYE SURGERY      cataract surgery both eyes   • KNEE ARTHROSCOPY Bilateral    • NEUROPLASTY / TRANSPOSITION ULNAR NERVE AT ELBOW     • OTHER SURGICAL HISTORY      Foraminotomy and discectomy   • PERICARDIAL WINDOW N/A 8/25/2017    Procedure: PERICARDIAL WINDOW;  Surgeon: Freeman Phillips MD;  Location:  [facility] OR;  Service:        Family History   Problem Relation Age of Onset   • Hypertension Mother    • Arthritis Mother    • Alcohol abuse Father    • Heart disease Other    • Stroke Other    • Hypertension Other    • Other Other      Neurologic disorder   • Parkinsonism Other    • Stroke Other    • Heart disease Other    • Hypertension Other    • Heart disease Other    • Stroke Other    • Hypertension Other        Social History     Social History   • Marital status:      Spouse name: N/A   • Number of children: 1   • Years of education: N/A     Occupational History   • [employer]  and [employer]      Disabled since 2002-Back Problems     Social History Main Topics   • Smoking status: Former Smoker     Packs/day: 1.00     Years: 10.00     Types: Cigarettes     Quit date: 1/1/1998   • Smokeless tobacco: Former User     Types: Snuff     Quit date: 5/24/2017   • Alcohol use Yes      Comment: 3 PER YEAR   • Drug use: No   • Sexual activity: Defer     Other Topics Concern   • None     Social History Narrative    Caffeine use: 0 serving daily.    Patient lives at home with wife.              Objective   Physical Exam   Constitutional: He is oriented to person, place, and time. He appears well-developed and well-nourished.   HENT:   Head: Normocephalic and atraumatic.   Right Ear: External ear normal.   Left Ear: External ear normal.   Nose: Nose normal.   Mouth/Throat: Oropharynx is clear and moist.   Patient has rhinorrhea.  There is no exudate uvula was midline   Eyes: Conjunctivae and EOM are normal. Pupils are equal, round, and reactive to light. No scleral icterus.   Neck: Normal range of motion. No thyromegaly present.   Cardiovascular: Normal rate, regular rhythm and normal heart sounds.    Pulmonary/Chest: Effort normal and breath sounds normal. No respiratory distress. He has no wheezes. He has no rales. He exhibits no tenderness.   Abdominal: Soft. Bowel sounds are normal. He exhibits no distension. There is no tenderness.   Musculoskeletal: Normal range of motion.   Lymphadenopathy:     He has no cervical adenopathy.   Neurological: He is alert and oriented to person, place, and time. He has normal reflexes. He displays normal reflexes. No cranial nerve deficit. Coordination normal.   Skin: Skin is warm and dry.   Psychiatric: He has a normal mood and affect. His behavior is normal. Judgment and thought content normal.   Nursing note and vitals reviewed.      Procedures         ED Course  ED Course     No results found for this or any previous visit (from the past 24 hour(s)).  Note: In  "addition to lab results from this visit, the labs listed above may include labs taken at another facility or during a different encounter within the last 24 hours. Please correlate lab times with ED admission and discharge times for further clarification of the services performed during this visit.    XR Chest 2 View   Final Result     Minimal scarring in the lingula. There are small pleural effusions.      THIS DOCUMENT HAS BEEN ELECTRONICALLY SIGNED BY ALISA HOLT MD        Vitals:    02/15/18 1700 02/15/18 1900 02/15/18 2113   BP: 167/78 137/75    BP Location: Left arm     Patient Position: Sitting     Pulse: 82     Resp: 18  16   Temp: 98 °F (36.7 °C)  97.4 °F (36.3 °C)   TempSrc: Oral  Oral   SpO2: 97%     Weight: 127 kg (280 lb)     Height: 167.6 cm (66\")       Medications   sodium chloride 0.9 % bolus 1,000 mL (0 mL Intravenous Stopped 2/15/18 2112)     ECG/EMG Results (last 24 hours)     Procedure Component Value Units Date/Time    ECG 12 Lead [[id number]] Collected:  02/15/18 1900     Updated:  02/15/18 1900            No results found for this or any previous visit (from the past 24 hour(s)).  Note: In addition to lab results from this visit, the labs listed above may include labs taken at another facility or during a different encounter within the last 24 hours. Please correlate lab times with ED admission and discharge times for further clarification of the services performed during this visit.    XR Chest 2 View   Final Result     Minimal scarring in the lingula. There are small pleural effusions.      THIS DOCUMENT HAS BEEN ELECTRONICALLY SIGNED BY ALISA HOLT MD        Vitals:    02/15/18 1700 02/15/18 1900 02/15/18 2113   BP: 167/78 137/75    BP Location: Left arm     Patient Position: Sitting     Pulse: 82     Resp: 18  16   Temp: 98 °F (36.7 °C)  97.4 °F (36.3 °C)   TempSrc: Oral  Oral   SpO2: 97%     Weight: 127 kg (280 lb)     Height: 167.6 cm (66\")       Medications   sodium chloride 0.9 % " bolus 1,000 mL (0 mL Intravenous Stopped 2/15/18 2112)     ECG/EMG Results (last 24 hours)     Procedure Component Value Units Date/Time    ECG 12 Lead [[id number]] Collected:  02/15/18 1900     Updated:  02/15/18 1900                    MDM  Number of Diagnoses or Management Options  Influenza: new and requires workup     Amount and/or Complexity of Data Reviewed  Clinical lab tests: reviewed and ordered  Tests in the radiology section of CPT®: reviewed and ordered  Tests in the medicine section of CPT®: ordered and reviewed  Discuss the patient with other providers: yes    Patient Progress  Patient progress: stable      Final diagnoses:   Influenza       Documentation assistance provided by wen Elder.  Information recorded by the scribe was done at my direction and has been verified and validated by me.     Cam Elder  02/15/18 1851       Cam Elder  02/15/18 1918       LARRY Springer  02/16/18 0947

## 2018-02-16 NOTE — DISCHARGE INSTRUCTIONS
Influenza, Adult  Influenza, more commonly known as “the flu,” is a viral infection that primarily affects the respiratory tract. The respiratory tract includes organs that help you breathe, such as the lungs, nose, and throat. The flu causes many common cold symptoms, as well as a high fever and body aches.  The flu spreads easily from person to person (is contagious). Getting a flu shot (influenza vaccination) every year is the best way to prevent influenza.  What are the causes?  Influenza is caused by a virus. You can catch the virus by:  · Breathing in droplets from an infected person's cough or sneeze.  · Touching something that was recently contaminated with the virus and then touching your mouth, nose, or eyes.  What increases the risk?  The following factors may make you more likely to get the flu:  · Not cleaning your hands frequently with soap and water or alcohol-based hand .  · Having close contact with many people during cold and flu season.  · Touching your mouth, eyes, or nose without washing or sanitizing your hands first.  · Not drinking enough fluids or not eating a healthy diet.  · Not getting enough sleep or exercise.  · Being under a high amount of stress.  · Not getting a yearly (annual) flu shot.  You may be at a higher risk of complications from the flu, such as a severe lung infection (pneumonia), if you:  · Are over the age of 65.  · Are pregnant.  · Have a weakened disease-fighting system (immune system). You may have a weakened immune system if you:  ¨ Have HIV or AIDS.  ¨ Are undergoing chemotherapy.  ¨ Are taking medicines that reduce the activity of (suppress) the immune system.  · Have a long-term (chronic) illness, such as heart disease, kidney disease, diabetes, or lung disease.  · Have a liver disorder.  · Are obese.  · Have anemia.  What are the signs or symptoms?  Symptoms of this condition typically last 4-10 days and may include:  · Fever.  · Chills.  · Headache, body  aches, or muscle aches.  · Sore throat.  · Cough.  · Runny or congested nose.  · Chest discomfort and cough.  · Poor appetite.  · Weakness or tiredness (fatigue).  · Dizziness.  · Nausea or vomiting.  How is this diagnosed?  This condition may be diagnosed based on your medical history and a physical exam. Your health care provider may do a nose or throat swab test to confirm the diagnosis.  How is this treated?  If influenza is detected early, you can be treated with antiviral medicine that can reduce the length of your illness and the severity of your symptoms. This medicine may be given by mouth (orally) or through an IV tube that is inserted in one of your veins.  The goal of treatment is to relieve symptoms by taking care of yourself at home. This may include taking over-the-counter medicines, drinking plenty of fluids, and adding humidity to the air in your home.  In some cases, influenza goes away on its own. Severe influenza or complications from influenza may be treated in a hospital.  Follow these instructions at home:  · Take over-the-counter and prescription medicines only as told by your health care provider.  · Use a cool mist humidifier to add humidity to the air in your home. This can make breathing easier.  · Rest as needed.  · Drink enough fluid to keep your urine clear or pale yellow.  · Cover your mouth and nose when you cough or sneeze.  · Wash your hands with soap and water often, especially after you cough or sneeze. If soap and water are not available, use hand .  · Stay home from work or school as told by your health care provider. Unless you are visiting your health care provider, try to avoid leaving home until your fever has been gone for 24 hours without the use of medicine.  · Keep all follow-up visits as told by your health care provider. This is important.  How is this prevented?  · Getting an annual flu shot is the best way to avoid getting the flu. You may get the flu shot  in late summer, fall, or winter. Ask your health care provider when you should get your flu shot.  · Wash your hands often or use hand  often.  · Avoid contact with people who are sick during cold and flu season.  · Eat a healthy diet, drink plenty of fluids, get enough sleep, and exercise regularly.  Contact a health care provider if:  · You develop new symptoms.  · You have:  ¨ Chest pain.  ¨ Diarrhea.  ¨ A fever.  · Your cough gets worse.  · You produce more mucus.  · You feel nauseous or you vomit.  Get help right away if:  · You develop shortness of breath or difficulty breathing.  · Your skin or nails turn a bluish color.  · You have severe pain or stiffness in your neck.  · You develop a sudden headache or sudden pain in your face or ear.  · You cannot stop vomiting.  This information is not intended to replace advice given to you by your health care provider. Make sure you discuss any questions you have with your health care provider.  Document Released: 12/15/2001 Document Revised: 05/25/2017 Document Reviewed: 10/11/2016  [employer] Interactive Patient Education © 2017 [employer]

## 2018-02-19 RX ORDER — GABAPENTIN 300 MG/1
CAPSULE ORAL
Qty: 60 CAPSULE | Refills: 0 | OUTPATIENT
Start: 2018-02-19 | End: 2018-08-19

## 2018-02-19 RX ORDER — MONTELUKAST SODIUM 10 MG/1
TABLET ORAL
Qty: 30 TABLET | Refills: 0 | OUTPATIENT
Start: 2018-02-19

## 2018-02-19 NOTE — TELEPHONE ENCOUNTER
Patient hasn't been seen since 5/17 but is scheduled to see Lynnette Gamble on 3/2. Would you like me to give him a 1 month supply till he sees her? Thanks!!

## 2018-03-02 ENCOUNTER — OFFICE VISIT (OUTPATIENT)
Dept: NEUROLOGY | Facility: CLINIC | Age: 58
End: 2018-03-02

## 2018-03-02 VITALS — HEIGHT: 66 IN | WEIGHT: 280 LBS | BODY MASS INDEX: 45 KG/M2

## 2018-03-02 DIAGNOSIS — R26.89 IMPAIRMENT OF BALANCE: Primary | ICD-10-CM

## 2018-03-02 DIAGNOSIS — G89.29 CHRONIC INTRACTABLE HEADACHE, UNSPECIFIED HEADACHE TYPE: ICD-10-CM

## 2018-03-02 DIAGNOSIS — R51.9 CHRONIC INTRACTABLE HEADACHE, UNSPECIFIED HEADACHE TYPE: ICD-10-CM

## 2018-03-02 PROCEDURE — 99215 OFFICE O/P EST HI 40 MIN: CPT | Performed by: PHYSICIAN ASSISTANT

## 2018-03-02 RX ORDER — AMLODIPINE BESYLATE 2.5 MG/1
TABLET ORAL
Refills: 0 | COMMUNITY
Start: 2018-02-21 | End: 2018-05-15 | Stop reason: HOSPADM

## 2018-03-02 RX ORDER — AMITRIPTYLINE HYDROCHLORIDE 25 MG/1
25 TABLET, FILM COATED ORAL NIGHTLY
Qty: 120 TABLET | Refills: 11 | Status: SHIPPED | OUTPATIENT
Start: 2018-03-02 | End: 2018-03-05 | Stop reason: SDUPTHER

## 2018-03-02 RX ORDER — IBUPROFEN 800 MG/1
TABLET ORAL
Refills: 0 | Status: ON HOLD | COMMUNITY
Start: 2018-02-13 | End: 2019-05-23

## 2018-03-02 NOTE — PROGRESS NOTES
"Subjective     Chief Complaint: headaches     History of Present Illness   Denzel Harding is a 57 y.o. male who comes to clinic today for evaluation of headaches. He was previously followed by Dr. Shoemaker. He has noted symptoms for several years marked by a constant stabbing right frontal headache. He notes associated light and sound sensitivity. He also notes associated blurry vision and visual halos. He denies any modifying factors.     He has been hospitalized at [facility] several times over the last year. He was most recently hospitalized in 1/18 for a headache as well as left sided numbness and weakness. He also noted associated dizziness. An MRI of the brain at this time was unremarkable. A CTA of the head and neck was notable for stenotic plaque in the MCA vessels bilaterally, though was otherwise essentially unremarkable. An echo was unremarkable. He was treated with indomethacin, which was somewhat beneficial.     He was also hospitalized at [facility] in 12/17 for left sided weakness, slurred speech, and word finding difficulties. He noted associated left facial weakness. He was treated with IV tPA. He was also treated with ASA 325mg and Lipitor 80mg daily. However, his Lipitor was recently decreased to 40mg daily per neurosurgery.    He is currently taking TPM 100mg nightly and GBP 300mg BID. He was unable to tolerate higher doses of both of these medications. He was also unable to tolerate Depakote.       I have reviewed and confirmed the past family, social and medical history as accurate on 3/2/18.     Review of Systems   Constitutional: Negative.    HENT: Negative.    Eyes: Negative.    Respiratory: Negative.    Cardiovascular: Negative.    Gastrointestinal: Negative.    Endocrine: Negative.    Genitourinary: Negative.    Musculoskeletal: Negative.    Skin: Negative.    Allergic/Immunologic: Negative.    Hematological: Negative.    Psychiatric/Behavioral: Negative.        Objective     Ht 167.6 cm (66\")  Wt 127 " kg (280 lb)  BMI 45.19 kg/m2    General appearance today is normal.       Physical Exam   Neurological: He has normal strength. He has a normal Finger-Nose-Finger Test.   Psychiatric: His speech is normal.        Neurologic Exam     Mental Status   Speech: speech is normal   Level of consciousness: alert  Normal comprehension.     Cranial Nerves   Cranial nerves II through XII intact.     Motor Exam   Muscle bulk: normal  Overall muscle tone: normal    Strength   Strength 5/5 throughout.     Sensory Exam   Light touch normal.     Gait, Coordination, and Reflexes     Gait  Gait: (unsteady)    Coordination   Finger to nose coordination: normal    Tremor   Resting tremor: absent          Assessment/Plan   Denzel was seen today for migraine.    Diagnoses and all orders for this visit:    Impairment of balance  -     Ambulatory Referral to Physical Therapy Evaluate and treat (balance impairment)    Chronic intractable headache, unspecified headache type          Discussion/Summary   Denzel Harding comes to clinic today for evaluation of headaches. The etiology is unclear, though I am somewhat concerning that his symptoms are potentially related to migraines. This was discussed at length with the patient and his family. After discussing potential treatment options, it was elected to continue on TPM and GBP unchanged and add amitriptyline.     As for his history of suspected stroke, his workup has been complete and appropriate. He will continue on ASA and Lipitor unchanged.     He will then follow up in 2 months, or sooner if needed.       I spent 25 minutes out of 45 minutes face to face with the patient and family and discussing diagnosis, prognosis, diagnostic testing, evaluation, current status, treatment options and management.    As part of this visit I reviewed radiology images, obtained additional history from the family which is incorporated in the HPI and reviewed records from prior hospitalizations which is  incorporated in the HPI.      Lynnette Gamble PA-C

## 2018-03-05 RX ORDER — AMITRIPTYLINE HYDROCHLORIDE 25 MG/1
25 TABLET, FILM COATED ORAL NIGHTLY
Qty: 120 TABLET | Refills: 11 | Status: SHIPPED | OUTPATIENT
Start: 2018-03-05 | End: 2018-05-09 | Stop reason: SDUPTHER

## 2018-03-21 RX ORDER — MONTELUKAST SODIUM 10 MG/1
TABLET ORAL
Qty: 30 TABLET | Refills: 0 | Status: SHIPPED | OUTPATIENT
Start: 2018-03-21 | End: 2019-02-19 | Stop reason: SDUPTHER

## 2018-03-29 RX ORDER — AMLODIPINE BESYLATE 5 MG/1
TABLET ORAL
Qty: 90 TABLET | Refills: 1 | Status: SHIPPED | OUTPATIENT
Start: 2018-03-29 | End: 2018-07-24 | Stop reason: SDUPTHER

## 2018-03-29 RX ORDER — METOPROLOL TARTRATE 50 MG/1
TABLET, FILM COATED ORAL
Qty: 180 TABLET | Refills: 0 | Status: SHIPPED | OUTPATIENT
Start: 2018-03-29 | End: 2018-05-23 | Stop reason: SDUPTHER

## 2018-04-18 ENCOUNTER — HOSPITAL ENCOUNTER (EMERGENCY)
Facility: HOSPITAL | Age: 58
Discharge: HOME | End: 2018-04-18
Attending: EMERGENCY MEDICINE | Admitting: EMERGENCY MEDICINE

## 2018-04-18 ENCOUNTER — APPOINTMENT (OUTPATIENT)
Dept: CT IMAGING | Facility: HOSPITAL | Age: 58
End: 2018-04-18

## 2018-04-18 VITALS
BODY MASS INDEX: 41.95 KG/M2 | DIASTOLIC BLOOD PRESSURE: 65 MMHG | SYSTOLIC BLOOD PRESSURE: 114 MMHG | OXYGEN SATURATION: 95 % | RESPIRATION RATE: 16 BRPM | HEIGHT: 70 IN | TEMPERATURE: 97.4 F | WEIGHT: 293 LBS | HEART RATE: 83 BPM

## 2018-04-18 DIAGNOSIS — R10.30 LOWER ABDOMINAL PAIN: Primary | ICD-10-CM

## 2018-04-18 LAB
ALBUMIN SERPL-MCNC: 4.2 G/DL (ref 3.5–5)
ALBUMIN/GLOB SERPL: 1.6 G/DL (ref 1–2)
ALP SERPL-CCNC: 83 U/L (ref 38–126)
ALT SERPL W P-5'-P-CCNC: 49 U/L (ref 13–69)
ANION GAP SERPL CALCULATED.3IONS-SCNC: 16.9 MMOL/L (ref 10–20)
AST SERPL-CCNC: 30 U/L (ref 15–46)
BASOPHILS # BLD AUTO: 0.05 10*3/MM3 (ref 0–0.2)
BASOPHILS NFR BLD AUTO: 0.6 % (ref 0–2.5)
BILIRUB SERPL-MCNC: 0.6 MG/DL (ref 0.2–1.3)
BILIRUB UR QL STRIP: NEGATIVE
BUN BLD-MCNC: 16 MG/DL (ref 7–20)
BUN/CREAT SERPL: 17.8 (ref 6.3–21.9)
CALCIUM SPEC-SCNC: 9.6 MG/DL (ref 8.4–10.2)
CHLORIDE SERPL-SCNC: 103 MMOL/L (ref 98–107)
CLARITY UR: CLEAR
CO2 SERPL-SCNC: 26 MMOL/L (ref 26–30)
COLOR UR: YELLOW
CREAT BLD-MCNC: 0.9 MG/DL (ref 0.6–1.3)
D-LACTATE SERPL-SCNC: 1.2 MMOL/L (ref 0.5–2)
DEPRECATED RDW RBC AUTO: 38.5 FL (ref 37–54)
EOSINOPHIL # BLD AUTO: 0.31 10*3/MM3 (ref 0–0.7)
EOSINOPHIL NFR BLD AUTO: 4 % (ref 0–7)
ERYTHROCYTE [DISTWIDTH] IN BLOOD BY AUTOMATED COUNT: 13.9 % (ref 11.5–14.5)
GFR SERPL CREATININE-BSD FRML MDRD: 87 ML/MIN/1.73
GLOBULIN UR ELPH-MCNC: 2.7 GM/DL
GLUCOSE BLD-MCNC: 227 MG/DL (ref 74–98)
GLUCOSE UR STRIP-MCNC: NEGATIVE MG/DL
HCT VFR BLD AUTO: 37.9 % (ref 42–52)
HGB BLD-MCNC: 12.5 G/DL (ref 14–18)
HGB UR QL STRIP.AUTO: NEGATIVE
IMM GRANULOCYTES # BLD: 0.08 10*3/MM3 (ref 0–0.06)
IMM GRANULOCYTES NFR BLD: 1 % (ref 0–0.6)
KETONES UR QL STRIP: NEGATIVE
LEUKOCYTE ESTERASE UR QL STRIP.AUTO: NEGATIVE
LIPASE SERPL-CCNC: 97 U/L (ref 23–300)
LYMPHOCYTES # BLD AUTO: 1.96 10*3/MM3 (ref 0.6–3.4)
LYMPHOCYTES NFR BLD AUTO: 25.3 % (ref 10–50)
MCH RBC QN AUTO: 25.9 PG (ref 27–31)
MCHC RBC AUTO-ENTMCNC: 33 G/DL (ref 30–37)
MCV RBC AUTO: 78.5 FL (ref 80–94)
MONOCYTES # BLD AUTO: 0.53 10*3/MM3 (ref 0–0.9)
MONOCYTES NFR BLD AUTO: 6.8 % (ref 0–12)
NEUTROPHILS # BLD AUTO: 4.83 10*3/MM3 (ref 2–6.9)
NEUTROPHILS NFR BLD AUTO: 62.3 % (ref 37–80)
NITRITE UR QL STRIP: NEGATIVE
NRBC BLD MANUAL-RTO: 0 /100 WBC (ref 0–0)
PH UR STRIP.AUTO: 6 [PH] (ref 5–8)
PLATELET # BLD AUTO: 182 10*3/MM3 (ref 130–400)
PMV BLD AUTO: 10.2 FL (ref 6–12)
POTASSIUM BLD-SCNC: 3.9 MMOL/L (ref 3.5–5.1)
PROT SERPL-MCNC: 6.9 G/DL (ref 6.3–8.2)
PROT UR QL STRIP: NEGATIVE
RBC # BLD AUTO: 4.83 10*6/MM3 (ref 4.7–6.1)
SODIUM BLD-SCNC: 142 MMOL/L (ref 137–145)
SP GR UR STRIP: 1.01 (ref 1–1.03)
UROBILINOGEN UR QL STRIP: NORMAL
WBC NRBC COR # BLD: 7.76 10*3/MM3 (ref 4.8–10.8)

## 2018-04-18 PROCEDURE — 96375 TX/PRO/DX INJ NEW DRUG ADDON: CPT

## 2018-04-18 PROCEDURE — 80053 COMPREHEN METABOLIC PANEL: CPT | Performed by: NURSE PRACTITIONER

## 2018-04-18 PROCEDURE — 25010000002 ONDANSETRON PER 1 MG: Performed by: NURSE PRACTITIONER

## 2018-04-18 PROCEDURE — 99284 EMERGENCY DEPT VISIT MOD MDM: CPT

## 2018-04-18 PROCEDURE — 85025 COMPLETE CBC W/AUTO DIFF WBC: CPT | Performed by: NURSE PRACTITIONER

## 2018-04-18 PROCEDURE — 74177 CT ABD & PELVIS W/CONTRAST: CPT

## 2018-04-18 PROCEDURE — 81003 URINALYSIS AUTO W/O SCOPE: CPT | Performed by: NURSE PRACTITIONER

## 2018-04-18 PROCEDURE — 83690 ASSAY OF LIPASE: CPT | Performed by: NURSE PRACTITIONER

## 2018-04-18 PROCEDURE — 25010000002 IOPAMIDOL 61 % SOLUTION: Performed by: EMERGENCY MEDICINE

## 2018-04-18 PROCEDURE — 25010000002 FENTANYL CITRATE (PF) 100 MCG/2ML SOLUTION: Performed by: NURSE PRACTITIONER

## 2018-04-18 PROCEDURE — 83605 ASSAY OF LACTIC ACID: CPT | Performed by: NURSE PRACTITIONER

## 2018-04-18 PROCEDURE — 96374 THER/PROPH/DIAG INJ IV PUSH: CPT

## 2018-04-18 RX ORDER — ONDANSETRON 2 MG/ML
4 INJECTION INTRAMUSCULAR; INTRAVENOUS ONCE
Status: COMPLETED | OUTPATIENT
Start: 2018-04-18 | End: 2018-04-18

## 2018-04-18 RX ORDER — ONDANSETRON 4 MG/1
4 TABLET, FILM COATED ORAL EVERY 6 HOURS PRN
Qty: 20 TABLET | Refills: 0 | Status: SHIPPED | OUTPATIENT
Start: 2018-04-18 | End: 2018-06-18

## 2018-04-18 RX ORDER — FENTANYL CITRATE 50 UG/ML
50 INJECTION, SOLUTION INTRAMUSCULAR; INTRAVENOUS ONCE
Status: COMPLETED | OUTPATIENT
Start: 2018-04-18 | End: 2018-04-18

## 2018-04-18 RX ADMIN — SODIUM CHLORIDE 1000 ML: 9 INJECTION, SOLUTION INTRAVENOUS at 13:04

## 2018-04-18 RX ADMIN — IOPAMIDOL 100 ML: 612 INJECTION, SOLUTION INTRAVENOUS at 14:00

## 2018-04-18 RX ADMIN — ONDANSETRON 4 MG: 2 INJECTION INTRAMUSCULAR; INTRAVENOUS at 13:01

## 2018-04-18 RX ADMIN — FENTANYL CITRATE 50 MCG: 50 INJECTION INTRAMUSCULAR; INTRAVENOUS at 13:02

## 2018-04-18 NOTE — ED PROVIDER NOTES
Subjective   History of Present Illness  58-year-old male presents with lower abdominal pain that started on Sunday.  He states that it started in the left lower region and then has spread across his entire abdomen since Sunday.  He's been nauseated today.  No fevers or chills.  He denies any difficulty moving his bowels or bladder.  The pain is now constant.  States that movement makes it worse.  He has not tried anything that has made it better.  He denies chest pain or shortness of breath.  He has multiple chronic comorbidities that are listed in his past medical history.  Review of Systems   All other systems reviewed and are negative.      Past Medical History:   Diagnosis Date   • Anxiety    • Arthritis    • Asthma    • BiPAP (biphasic positive airway pressure) dependence    • Brachial neuritis 1/19/2017   • Chest pain    • CHF (congestive heart failure)    • COPD (chronic obstructive pulmonary disease)    • Coronary artery disease    • Depression    • Diabetes mellitus    • Dizziness    • Edema    • GERD (gastroesophageal reflux disease)    • H/O chest x-ray 07/04/2015    Mild Left base atelectasis   • H/O echocardiogram 07/05/2015    Normal LVSF. EF of 60-65%. Grade 1 diastolic dysfunction of the LV myocardium. No evidence of pericardial effusion   • H/O exercise stress test    • History of herniated intervertebral disc     History of left L5-S1 disc herniation   • Hypertension    • LOM (loss of memory) 1/19/2017   • Lower back pain     Right   • Measles    • Obstructive sleep apnea    • Palpitations    • Pericardial effusion    • Seizure disorder    • Shortness of breath 1/19/2017   • SOB (shortness of breath)    • Stroke    • Wears glasses        Allergies   Allergen Reactions   • Sulfa Antibiotics Anaphylaxis, Nausea And Vomiting and Delirium   • Codeine Nausea And Vomiting     Can only take with Phenergan   • Invokana [Canagliflozin]      DKA   • Morphine      Does not work. It causes pain       Past  Surgical History:   Procedure Laterality Date   • BACK SURGERY     • CARDIAC CATHETERIZATION     • CARDIAC CATHETERIZATION N/A 8/11/2017    Procedure: Coronary angiography;  Surgeon: Dallas Hernandez MD;  Location: [facility] CATH INVASIVE LOCATION;  Service:    • CARDIAC CATHETERIZATION N/A 8/11/2017    Procedure: Left Heart Cath;  Surgeon: Dallas Hernandez MD;  Location: [facility] CATH INVASIVE LOCATION;  Service:    • CARDIAC CATHETERIZATION N/A 8/11/2017    Procedure: Left ventriculography;  Surgeon: Dallas Hernandez MD;  Location: [facility] CATH INVASIVE LOCATION;  Service:    • CARDIOVASCULAR STRESS TEST  07/03/2017    WITH DR HERNANDEZ AT [facility]   • CARPAL TUNNEL RELEASE Right    • CATARACT EXTRACTION W/ INTRAOCULAR LENS IMPLANT Right 6/12/2017    Procedure: CATARACT PHACO EXTRACTION WITH INTRAOCULAR LENS IMPLANT RIGHT ;  Surgeon: Natalie Cao MD;  Location: [facility] OR;  Service:    • CATARACT EXTRACTION W/ INTRAOCULAR LENS IMPLANT Left 7/10/2017    Procedure: CATARACT PHACO EXTRACTION WITH INTRAOCULAR LENS IMPLANT LEFT;  Surgeon: Natalie Cao MD;  Location: [facility] OR;  Service:    • CHOLECYSTECTOMY     • CHOLECYSTECTOMY     • COLONOSCOPY     • CORONARY ANGIOPLASTY WITH STENT PLACEMENT     • CORONARY ANGIOPLASTY WITH STENT PLACEMENT      X1-LAD   • CORONARY ARTERY BYPASS GRAFT N/A 8/15/2017    Procedure: CORONARY ARTERY BYPASS GRAFTING x 3 UTILIZING THE LEFT INTERNAL MAMMARY ARTERY WITH ENDOSCOPIC VEIN HARVESTING OF THE RIGHT GREATER SAPHENOUS VEIN, HAMLET, LAD ENDARECTOMY;  Surgeon: London Damon MD;  Location: [facility] OR;  Service:    • ENDOSCOPY     • EYE SURGERY      cataract surgery both eyes   • KNEE ARTHROSCOPY Bilateral    • NEUROPLASTY / TRANSPOSITION ULNAR NERVE AT ELBOW     • OTHER SURGICAL HISTORY      Foraminotomy and discectomy   • PERICARDIAL WINDOW N/A 8/25/2017    Procedure: PERICARDIAL WINDOW;  Surgeon: Freeman Phillips MD;  Location: [facility] OR;  Service:        Family History   Problem Relation Age of  Onset   • Hypertension Mother    • Arthritis Mother    • Alcohol abuse Father    • Heart disease Other    • Stroke Other    • Hypertension Other    • Other Other      Neurologic disorder   • Parkinsonism Other    • Stroke Other    • Heart disease Other    • Hypertension Other    • Heart disease Other    • Stroke Other    • Hypertension Other        Social History     Social History   • Marital status:    • Number of children: 1     Occupational History   • [employer]      Disabled since 2002-Back Problems     Social History Main Topics   • Smoking status: Former Smoker     Packs/day: 1.00     Years: 10.00     Types: Cigarettes     Quit date: 1/1/1998   • Smokeless tobacco: Former User     Types: Snuff     Quit date: 5/24/2017   • Alcohol use Yes      Comment: 3 PER YEAR   • Drug use: No   • Sexual activity: Defer     Other Topics Concern   • Not on file     Social History Narrative    Caffeine use: 0 serving daily.    Patient lives at home with wife.                Objective   Physical Exam   Constitutional: He is oriented to person, place, and time. He appears well-developed and well-nourished.   HENT:   Head: Normocephalic and atraumatic.   Mouth/Throat: Oropharynx is clear and moist.   Eyes: Conjunctivae and EOM are normal. Pupils are equal, round, and reactive to light.   Neck: Normal range of motion. Neck supple.   Cardiovascular: Normal rate, regular rhythm, normal heart sounds and intact distal pulses.    Pulmonary/Chest: Effort normal and breath sounds normal.   Abdominal: Soft. Bowel sounds are normal. There is tenderness.   Pain with palpation in the lower quadrants bilaterally. Mild guarding, no rebound.   Musculoskeletal: Normal range of motion. He exhibits no edema.   Neurological: He is alert and oriented to person, place, and time.   Skin: Skin is warm and dry. Capillary refill takes less than 2 seconds.   Psychiatric: He has a normal mood and affect. His behavior is normal.  Judgment and thought content normal.   Nursing note and vitals reviewed.      Procedures         ED Course  ED Course      Labs are normal.  Lactate is 1.2.  CT of the abdomen is negative for any acute abnormalities.  He had complete relief of his pain after fentanyl 50 mics IV and Zofran 4 mg IV.  He's had a liter of fluids.  He states that it is still somewhat tender at this time.  Explained to him that there is no acute abnormality that is found.  I recommended that he follow up with his primary care provider.  He states understanding.            [facility]    Final diagnoses:   Lower abdominal pain            Tiffanie Mayorga, APRN  04/18/18 1958

## 2018-04-20 ENCOUNTER — APPOINTMENT (OUTPATIENT)
Dept: CT IMAGING | Facility: HOSPITAL | Age: 58
End: 2018-04-20

## 2018-04-20 ENCOUNTER — HOSPITAL ENCOUNTER (EMERGENCY)
Facility: HOSPITAL | Age: 58
Discharge: HOME | End: 2018-04-20
Attending: EMERGENCY MEDICINE | Admitting: EMERGENCY MEDICINE

## 2018-04-20 VITALS
HEIGHT: 70 IN | OXYGEN SATURATION: 98 % | SYSTOLIC BLOOD PRESSURE: 109 MMHG | DIASTOLIC BLOOD PRESSURE: 73 MMHG | WEIGHT: 289 LBS | RESPIRATION RATE: 17 BRPM | HEART RATE: 75 BPM | BODY MASS INDEX: 41.37 KG/M2 | TEMPERATURE: 98.3 F

## 2018-04-20 DIAGNOSIS — R10.32 LEFT LOWER QUADRANT PAIN: Primary | ICD-10-CM

## 2018-04-20 LAB
ALBUMIN SERPL-MCNC: 4.1 G/DL (ref 3.5–5)
ALBUMIN/GLOB SERPL: 1.4 G/DL (ref 1–2)
ALP SERPL-CCNC: 83 U/L (ref 38–126)
ALT SERPL W P-5'-P-CCNC: 49 U/L (ref 13–69)
ANION GAP SERPL CALCULATED.3IONS-SCNC: 16.1 MMOL/L (ref 10–20)
AST SERPL-CCNC: 29 U/L (ref 15–46)
BASOPHILS # BLD AUTO: 0.05 10*3/MM3 (ref 0–0.2)
BASOPHILS NFR BLD AUTO: 0.6 % (ref 0–2.5)
BILIRUB SERPL-MCNC: 0.7 MG/DL (ref 0.2–1.3)
BILIRUB UR QL STRIP: NEGATIVE
BUN BLD-MCNC: 9 MG/DL (ref 7–20)
BUN/CREAT SERPL: 11.3 (ref 6.3–21.9)
CALCIUM SPEC-SCNC: 9.3 MG/DL (ref 8.4–10.2)
CHLORIDE SERPL-SCNC: 104 MMOL/L (ref 98–107)
CLARITY UR: CLEAR
CO2 SERPL-SCNC: 27 MMOL/L (ref 26–30)
COLOR UR: YELLOW
CREAT BLD-MCNC: 0.8 MG/DL (ref 0.6–1.3)
DEPRECATED RDW RBC AUTO: 38.1 FL (ref 37–54)
EOSINOPHIL # BLD AUTO: 0.3 10*3/MM3 (ref 0–0.7)
EOSINOPHIL NFR BLD AUTO: 3.8 % (ref 0–7)
ERYTHROCYTE [DISTWIDTH] IN BLOOD BY AUTOMATED COUNT: 13.8 % (ref 11.5–14.5)
GFR SERPL CREATININE-BSD FRML MDRD: 100 ML/MIN/1.73
GLOBULIN UR ELPH-MCNC: 2.9 GM/DL
GLUCOSE BLD-MCNC: 192 MG/DL (ref 74–98)
GLUCOSE UR STRIP-MCNC: NEGATIVE MG/DL
HCT VFR BLD AUTO: 40.2 % (ref 42–52)
HGB BLD-MCNC: 13.5 G/DL (ref 14–18)
HGB UR QL STRIP.AUTO: NEGATIVE
HOLD SPECIMEN: NORMAL
HOLD SPECIMEN: NORMAL
IMM GRANULOCYTES # BLD: 0.04 10*3/MM3 (ref 0–0.06)
IMM GRANULOCYTES NFR BLD: 0.5 % (ref 0–0.6)
KETONES UR QL STRIP: NEGATIVE
LEUKOCYTE ESTERASE UR QL STRIP.AUTO: NEGATIVE
LIPASE SERPL-CCNC: 51 U/L (ref 23–300)
LYMPHOCYTES # BLD AUTO: 2.2 10*3/MM3 (ref 0.6–3.4)
LYMPHOCYTES NFR BLD AUTO: 27.8 % (ref 10–50)
MCH RBC QN AUTO: 26 PG (ref 27–31)
MCHC RBC AUTO-ENTMCNC: 33.6 G/DL (ref 30–37)
MCV RBC AUTO: 77.3 FL (ref 80–94)
MONOCYTES # BLD AUTO: 0.53 10*3/MM3 (ref 0–0.9)
MONOCYTES NFR BLD AUTO: 6.7 % (ref 0–12)
NEUTROPHILS # BLD AUTO: 4.8 10*3/MM3 (ref 2–6.9)
NEUTROPHILS NFR BLD AUTO: 60.6 % (ref 37–80)
NITRITE UR QL STRIP: NEGATIVE
NRBC BLD MANUAL-RTO: 0 /100 WBC (ref 0–0)
PH UR STRIP.AUTO: 6 [PH] (ref 5–8)
PLATELET # BLD AUTO: 199 10*3/MM3 (ref 130–400)
PMV BLD AUTO: 10.1 FL (ref 6–12)
POTASSIUM BLD-SCNC: 4.1 MMOL/L (ref 3.5–5.1)
PROT SERPL-MCNC: 7 G/DL (ref 6.3–8.2)
PROT UR QL STRIP: NEGATIVE
RBC # BLD AUTO: 5.2 10*6/MM3 (ref 4.7–6.1)
SODIUM BLD-SCNC: 143 MMOL/L (ref 137–145)
SP GR UR STRIP: 1.01 (ref 1–1.03)
UROBILINOGEN UR QL STRIP: NORMAL
WBC NRBC COR # BLD: 7.92 10*3/MM3 (ref 4.8–10.8)
WHOLE BLOOD HOLD SPECIMEN: NORMAL
WHOLE BLOOD HOLD SPECIMEN: NORMAL

## 2018-04-20 PROCEDURE — 25010000002 HYDROMORPHONE PER 4 MG: Performed by: EMERGENCY MEDICINE

## 2018-04-20 PROCEDURE — 83690 ASSAY OF LIPASE: CPT | Performed by: PHYSICIAN ASSISTANT

## 2018-04-20 PROCEDURE — 25010000002 ONDANSETRON PER 1 MG: Performed by: PHYSICIAN ASSISTANT

## 2018-04-20 PROCEDURE — 96374 THER/PROPH/DIAG INJ IV PUSH: CPT

## 2018-04-20 PROCEDURE — 25010000002 IOPAMIDOL 61 % SOLUTION: Performed by: EMERGENCY MEDICINE

## 2018-04-20 PROCEDURE — 80053 COMPREHEN METABOLIC PANEL: CPT | Performed by: PHYSICIAN ASSISTANT

## 2018-04-20 PROCEDURE — 96375 TX/PRO/DX INJ NEW DRUG ADDON: CPT

## 2018-04-20 PROCEDURE — 74177 CT ABD & PELVIS W/CONTRAST: CPT

## 2018-04-20 PROCEDURE — 81003 URINALYSIS AUTO W/O SCOPE: CPT | Performed by: PHYSICIAN ASSISTANT

## 2018-04-20 PROCEDURE — 85025 COMPLETE CBC W/AUTO DIFF WBC: CPT | Performed by: PHYSICIAN ASSISTANT

## 2018-04-20 PROCEDURE — 99283 EMERGENCY DEPT VISIT LOW MDM: CPT

## 2018-04-20 RX ORDER — MORPHINE SULFATE 4 MG/ML
4 INJECTION, SOLUTION INTRAMUSCULAR; INTRAVENOUS ONCE
Status: DISCONTINUED | OUTPATIENT
Start: 2018-04-20 | End: 2018-04-20

## 2018-04-20 RX ORDER — SODIUM CHLORIDE 0.9 % (FLUSH) 0.9 %
10 SYRINGE (ML) INJECTION AS NEEDED
Status: DISCONTINUED | OUTPATIENT
Start: 2018-04-20 | End: 2018-04-20 | Stop reason: HOSPADM

## 2018-04-20 RX ORDER — ONDANSETRON 2 MG/ML
4 INJECTION INTRAMUSCULAR; INTRAVENOUS ONCE
Status: COMPLETED | OUTPATIENT
Start: 2018-04-20 | End: 2018-04-20

## 2018-04-20 RX ADMIN — HYDROMORPHONE HYDROCHLORIDE 1 MG: 1 INJECTION, SOLUTION INTRAMUSCULAR; INTRAVENOUS; SUBCUTANEOUS at 14:35

## 2018-04-20 RX ADMIN — IOPAMIDOL 100 ML: 612 INJECTION, SOLUTION INTRAVENOUS at 14:59

## 2018-04-20 RX ADMIN — SODIUM CHLORIDE 1000 ML: 9 INJECTION, SOLUTION INTRAVENOUS at 14:08

## 2018-04-20 RX ADMIN — ONDANSETRON 4 MG: 2 INJECTION INTRAMUSCULAR; INTRAVENOUS at 14:35

## 2018-04-20 NOTE — ED PROVIDER NOTES
Subjective   58 year-old male presents with left lower quadrant and lower abdominal pain for 5 days.  Seen in emergency department 2 days ago for similar symptoms had labs and a CT scan performed that were within normal limits with the exception of elevated white count.  He was told to come back to the emergency department if symptoms worsen, he states his pain is worse.  His appetite is decreased.  The pain radiates from his left lower quadrant across his abdomen.  It is tender to touch and with movement.  Normal bowel movements and no urinary symptoms.        History provided by:  Patient   used: No    Abdominal Pain   Pain location:  LLQ and suprapubic  Pain quality: aching    Pain radiates to:  Does not radiate  Pain severity:  Moderate  Onset quality:  Gradual  Duration:  5 days  Timing:  Constant  Progression:  Worsening  Chronicity:  New  Relieved by:  Nothing  Worsened by:  Movement and palpation  Ineffective treatments:  None tried  Associated symptoms: anorexia        Review of Systems   Gastrointestinal: Positive for abdominal pain and anorexia.   All other systems reviewed and are negative.      Past Medical History:   Diagnosis Date   • Anxiety    • Arthritis    • Asthma    • BiPAP (biphasic positive airway pressure) dependence    • Brachial neuritis 1/19/2017   • Chest pain    • CHF (congestive heart failure)    • COPD (chronic obstructive pulmonary disease)    • Coronary artery disease    • Depression    • Diabetes mellitus    • Dizziness    • Edema    • GERD (gastroesophageal reflux disease)    • H/O chest x-ray 07/04/2015    Mild Left base atelectasis   • H/O echocardiogram 07/05/2015    Normal LVSF. EF of 60-65%. Grade 1 diastolic dysfunction of the LV myocardium. No evidence of pericardial effusion   • H/O exercise stress test    • History of herniated intervertebral disc     History of left L5-S1 disc herniation   • Hypertension    • LOM (loss of memory) 1/19/2017   • Lower  back pain     Right   • Measles    • Obstructive sleep apnea    • Palpitations    • Pericardial effusion    • Seizure disorder    • Shortness of breath 1/19/2017   • SOB (shortness of breath)    • Stroke    • Wears glasses        Allergies   Allergen Reactions   • Sulfa Antibiotics Anaphylaxis, Nausea And Vomiting and Delirium   • Codeine Nausea And Vomiting     Can only take with Phenergan   • Invokana [Canagliflozin]      DKA   • Morphine      Does not work. It causes pain       Past Surgical History:   Procedure Laterality Date   • BACK SURGERY     • CARDIAC CATHETERIZATION     • CARDIAC CATHETERIZATION N/A 8/11/2017    Procedure: Coronary angiography;  Surgeon: Dallas Kim MD;  Location: [facility] CATH INVASIVE LOCATION;  Service:    • CARDIAC CATHETERIZATION N/A 8/11/2017    Procedure: Left Heart Cath;  Surgeon: Dallas Kim MD;  Location: [facility] CATH INVASIVE LOCATION;  Service:    • CARDIAC CATHETERIZATION N/A 8/11/2017    Procedure: Left ventriculography;  Surgeon: Dallas Kim MD;  Location: [facility] CATH INVASIVE LOCATION;  Service:    • CARDIOVASCULAR STRESS TEST  07/03/2017    WITH DR KIM AT [facility]   • CARPAL TUNNEL RELEASE Right    • CATARACT EXTRACTION W/ INTRAOCULAR LENS IMPLANT Right 6/12/2017    Procedure: CATARACT PHACO EXTRACTION WITH INTRAOCULAR LENS IMPLANT RIGHT ;  Surgeon: Natalie Cao MD;  Location: [facility];  Service:    • CATARACT EXTRACTION W/ INTRAOCULAR LENS IMPLANT Left 7/10/2017    Procedure: CATARACT PHACO EXTRACTION WITH INTRAOCULAR LENS IMPLANT LEFT;  Surgeon: Natalie Cao MD;  Location: [facility];  Service:    • CHOLECYSTECTOMY     • CHOLECYSTECTOMY     • COLONOSCOPY     • CORONARY ANGIOPLASTY WITH STENT PLACEMENT     • CORONARY ANGIOPLASTY WITH STENT PLACEMENT      X1-LAD   • CORONARY ARTERY BYPASS GRAFT N/A 8/15/2017    Procedure: CORONARY ARTERY BYPASS GRAFTING x 3 UTILIZING THE LEFT INTERNAL MAMMARY ARTERY WITH ENDOSCOPIC VEIN HARVESTING OF THE RIGHT GREATER  SAPHENOUS VEIN, HAMLET, LAD ENDARECTOMY;  Surgeon: London Damon MD;  Location:  [facility] OR;  Service:    • ENDOSCOPY     • EYE SURGERY      cataract surgery both eyes   • KNEE ARTHROSCOPY Bilateral    • NEUROPLASTY / TRANSPOSITION ULNAR NERVE AT ELBOW     • OTHER SURGICAL HISTORY      Foraminotomy and discectomy   • PERICARDIAL WINDOW N/A 8/25/2017    Procedure: PERICARDIAL WINDOW;  Surgeon: Freeman Phillips MD;  Location:  [facility] OR;  Service:        Family History   Problem Relation Age of Onset   • Hypertension Mother    • Arthritis Mother    • Alcohol abuse Father    • Heart disease Other    • Stroke Other    • Hypertension Other    • Other Other      Neurologic disorder   • Parkinsonism Other    • Stroke Other    • Heart disease Other    • Hypertension Other    • Heart disease Other    • Stroke Other    • Hypertension Other        Social History     Social History   • Marital status:    • Number of children: 1     Occupational History   • [employer]      Disabled since 2002-Back Problems     Social History Main Topics   • Smoking status: Former Smoker     Packs/day: 1.00     Years: 10.00     Types: Cigarettes     Quit date: 1/1/1998   • Smokeless tobacco: Former User     Types: Snuff     Quit date: 5/24/2017   • Alcohol use Yes      Comment: 3 PER YEAR   • Drug use: No   • Sexual activity: Defer     Other Topics Concern   • Not on file     Social History Narrative    Caffeine use: 0 serving daily.    Patient lives at home with wife.                Objective   Physical Exam   Constitutional: He is oriented to person, place, and time. He appears well-developed and well-nourished.   HENT:   Head: Normocephalic and atraumatic.   Eyes: EOM are normal. Pupils are equal, round, and reactive to light.   Neck: Normal range of motion.   Cardiovascular: Normal rate and regular rhythm.    Pulmonary/Chest: Effort normal and breath sounds normal.   Abdominal: Soft. Bowel sounds are normal.   Tender  palpation in the left mid left lower quadrant and suprapubic.  Mild guarding.  No distention.   Musculoskeletal: Normal range of motion.   Neurological: He is alert and oriented to person, place, and time.   Skin: Skin is warm and dry.   Psychiatric: He has a normal mood and affect. His behavior is normal. Judgment and thought content normal.   Vitals reviewed.      Procedures         ED Course  ED Course                  MDM    Final diagnoses:   Left lower quadrant pain            Rohit Kim Jr., PA-C  04/20/18 1547       Rohit Kim Jr., PA-C  04/20/18 1548

## 2018-04-22 ENCOUNTER — HOSPITAL ENCOUNTER (EMERGENCY)
Facility: HOSPITAL | Age: 58
Discharge: HOME | End: 2018-04-22
Attending: EMERGENCY MEDICINE | Admitting: EMERGENCY MEDICINE

## 2018-04-22 VITALS
TEMPERATURE: 98.5 F | OXYGEN SATURATION: 96 % | RESPIRATION RATE: 22 BRPM | HEIGHT: 70 IN | WEIGHT: 287 LBS | BODY MASS INDEX: 41.09 KG/M2 | DIASTOLIC BLOOD PRESSURE: 75 MMHG | HEART RATE: 76 BPM | SYSTOLIC BLOOD PRESSURE: 123 MMHG

## 2018-04-22 DIAGNOSIS — R10.83 ABDOMINAL COLIC: ICD-10-CM

## 2018-04-22 DIAGNOSIS — R10.9 ACUTE ABDOMINAL PAIN: Primary | ICD-10-CM

## 2018-04-22 LAB
ALBUMIN SERPL-MCNC: 4.2 G/DL (ref 3.2–4.8)
ALBUMIN/GLOB SERPL: 1.6 G/DL (ref 1.5–2.5)
ALP SERPL-CCNC: 95 U/L (ref 25–100)
ALT SERPL W P-5'-P-CCNC: 48 U/L (ref 7–40)
ANION GAP SERPL CALCULATED.3IONS-SCNC: 6 MMOL/L (ref 3–11)
AST SERPL-CCNC: 35 U/L (ref 0–33)
BACTERIA UR QL AUTO: NORMAL /HPF
BASOPHILS # BLD AUTO: 0.05 10*3/MM3 (ref 0–0.2)
BASOPHILS NFR BLD AUTO: 0.8 % (ref 0–1)
BILIRUB SERPL-MCNC: 0.8 MG/DL (ref 0.3–1.2)
BILIRUB UR QL STRIP: NEGATIVE
BUN BLD-MCNC: 10 MG/DL (ref 9–23)
BUN/CREAT SERPL: 10 (ref 7–25)
CALCIUM SPEC-SCNC: 9.1 MG/DL (ref 8.7–10.4)
CHLORIDE SERPL-SCNC: 103 MMOL/L (ref 99–109)
CLARITY UR: CLEAR
CO2 SERPL-SCNC: 26 MMOL/L (ref 20–31)
COLOR UR: ABNORMAL
CREAT BLD-MCNC: 1 MG/DL (ref 0.6–1.3)
DEPRECATED RDW RBC AUTO: 39.9 FL (ref 37–54)
EOSINOPHIL # BLD AUTO: 0.31 10*3/MM3 (ref 0–0.3)
EOSINOPHIL NFR BLD AUTO: 4.7 % (ref 0–3)
ERYTHROCYTE [DISTWIDTH] IN BLOOD BY AUTOMATED COUNT: 14.2 % (ref 11.3–14.5)
GFR SERPL CREATININE-BSD FRML MDRD: 77 ML/MIN/1.73
GLOBULIN UR ELPH-MCNC: 2.6 GM/DL
GLUCOSE BLD-MCNC: 186 MG/DL (ref 70–100)
GLUCOSE UR STRIP-MCNC: NEGATIVE MG/DL
HCT VFR BLD AUTO: 40.6 % (ref 38.9–50.9)
HGB BLD-MCNC: 13.4 G/DL (ref 13.1–17.5)
HGB UR QL STRIP.AUTO: NEGATIVE
HOLD SPECIMEN: NORMAL
HOLD SPECIMEN: NORMAL
HYALINE CASTS UR QL AUTO: NORMAL /LPF
IMM GRANULOCYTES # BLD: 0.03 10*3/MM3 (ref 0–0.03)
IMM GRANULOCYTES NFR BLD: 0.5 % (ref 0–0.6)
KETONES UR QL STRIP: NEGATIVE
LEUKOCYTE ESTERASE UR QL STRIP.AUTO: ABNORMAL
LIPASE SERPL-CCNC: 25 U/L (ref 6–51)
LYMPHOCYTES # BLD AUTO: 2.09 10*3/MM3 (ref 0.6–4.8)
LYMPHOCYTES NFR BLD AUTO: 31.6 % (ref 24–44)
MCH RBC QN AUTO: 25.5 PG (ref 27–31)
MCHC RBC AUTO-ENTMCNC: 33 G/DL (ref 32–36)
MCV RBC AUTO: 77.2 FL (ref 80–99)
MONOCYTES # BLD AUTO: 0.41 10*3/MM3 (ref 0–1)
MONOCYTES NFR BLD AUTO: 6.2 % (ref 0–12)
NEUTROPHILS # BLD AUTO: 3.76 10*3/MM3 (ref 1.5–8.3)
NEUTROPHILS NFR BLD AUTO: 56.7 % (ref 41–71)
NITRITE UR QL STRIP: NEGATIVE
PH UR STRIP.AUTO: 7 [PH] (ref 5–8)
PLATELET # BLD AUTO: 195 10*3/MM3 (ref 150–450)
PMV BLD AUTO: 10.6 FL (ref 6–12)
POTASSIUM BLD-SCNC: 3.7 MMOL/L (ref 3.5–5.5)
PROT SERPL-MCNC: 6.8 G/DL (ref 5.7–8.2)
PROT UR QL STRIP: NEGATIVE
RBC # BLD AUTO: 5.26 10*6/MM3 (ref 4.2–5.76)
RBC # UR: NORMAL /HPF
REF LAB TEST METHOD: NORMAL
SODIUM BLD-SCNC: 135 MMOL/L (ref 132–146)
SP GR UR STRIP: 1.02 (ref 1–1.03)
SQUAMOUS #/AREA URNS HPF: NORMAL /HPF
TROPONIN I SERPL-MCNC: <0.006 NG/ML
UROBILINOGEN UR QL STRIP: ABNORMAL
WBC NRBC COR # BLD: 6.62 10*3/MM3 (ref 3.5–10.8)
WBC UR QL AUTO: NORMAL /HPF
WHOLE BLOOD HOLD SPECIMEN: NORMAL
WHOLE BLOOD HOLD SPECIMEN: NORMAL

## 2018-04-22 PROCEDURE — 80053 COMPREHEN METABOLIC PANEL: CPT | Performed by: EMERGENCY MEDICINE

## 2018-04-22 PROCEDURE — 83690 ASSAY OF LIPASE: CPT | Performed by: EMERGENCY MEDICINE

## 2018-04-22 PROCEDURE — 81001 URINALYSIS AUTO W/SCOPE: CPT | Performed by: EMERGENCY MEDICINE

## 2018-04-22 PROCEDURE — 85025 COMPLETE CBC W/AUTO DIFF WBC: CPT

## 2018-04-22 PROCEDURE — 99283 EMERGENCY DEPT VISIT LOW MDM: CPT

## 2018-04-22 PROCEDURE — 93005 ELECTROCARDIOGRAM TRACING: CPT | Performed by: NURSE PRACTITIONER

## 2018-04-22 PROCEDURE — 84484 ASSAY OF TROPONIN QUANT: CPT | Performed by: NURSE PRACTITIONER

## 2018-04-22 RX ORDER — PROMETHAZINE HYDROCHLORIDE 25 MG/1
25 TABLET ORAL EVERY 8 HOURS PRN
Qty: 12 TABLET | Refills: 0 | Status: SHIPPED | OUTPATIENT
Start: 2018-04-22 | End: 2018-08-19

## 2018-04-22 RX ORDER — DICYCLOMINE HCL 20 MG
20 TABLET ORAL EVERY 8 HOURS PRN
Qty: 12 TABLET | Refills: 0 | Status: SHIPPED | OUTPATIENT
Start: 2018-04-22 | End: 2018-05-15 | Stop reason: HOSPADM

## 2018-04-22 RX ORDER — ALUMINA, MAGNESIA, AND SIMETHICONE 2400; 2400; 240 MG/30ML; MG/30ML; MG/30ML
15 SUSPENSION ORAL ONCE
Status: COMPLETED | OUTPATIENT
Start: 2018-04-22 | End: 2018-04-22

## 2018-04-22 RX ORDER — SODIUM CHLORIDE 0.9 % (FLUSH) 0.9 %
10 SYRINGE (ML) INJECTION AS NEEDED
Status: DISCONTINUED | OUTPATIENT
Start: 2018-04-22 | End: 2018-04-22 | Stop reason: HOSPADM

## 2018-04-22 RX ORDER — RANITIDINE 150 MG/1
150 TABLET ORAL 2 TIMES DAILY
Qty: 20 TABLET | Refills: 0 | Status: SHIPPED | OUTPATIENT
Start: 2018-04-22 | End: 2018-08-19

## 2018-04-22 RX ORDER — HYDROCODONE BITARTRATE AND ACETAMINOPHEN 10; 325 MG/1; MG/1
1 TABLET ORAL ONCE
Status: COMPLETED | OUTPATIENT
Start: 2018-04-22 | End: 2018-04-22

## 2018-04-22 RX ORDER — ONDANSETRON 4 MG/1
4 TABLET, ORALLY DISINTEGRATING ORAL ONCE
Status: COMPLETED | OUTPATIENT
Start: 2018-04-22 | End: 2018-04-22

## 2018-04-22 RX ADMIN — ONDANSETRON 4 MG: 4 TABLET, ORALLY DISINTEGRATING ORAL at 17:49

## 2018-04-22 RX ADMIN — LIDOCAINE HYDROCHLORIDE 15 ML: 20 SOLUTION ORAL; TOPICAL at 17:49

## 2018-04-22 RX ADMIN — ALUMINUM HYDROXIDE, MAGNESIUM HYDROXIDE, AND DIMETHICONE 15 ML: 400; 400; 40 SUSPENSION ORAL at 17:49

## 2018-04-22 RX ADMIN — HYDROCODONE BITARTRATE AND ACETAMINOPHEN 1 TABLET: 10; 325 TABLET ORAL at 17:50

## 2018-04-22 NOTE — ED PROVIDER NOTES
Subjective   Lower abd pain for several days. Has been to [facility] twice this month with two neg CTs.            Abdominal Pain   Pain location:  Generalized  Pain quality: aching and sharp    Pain radiates to:  Does not radiate  Pain severity:  Moderate  Onset quality:  Gradual  Duration:  2 weeks  Timing:  Constant  Progression:  Waxing and waning  Chronicity:  New  Context: eating    Relieved by:  Nothing  Worsened by:  Eating  Ineffective treatments:  None tried  Associated symptoms: nausea    Associated symptoms: no chest pain, no cough, no diarrhea, no fever, no shortness of breath, no sore throat and no vomiting        Review of Systems   Constitutional: Negative for diaphoresis and fever.   HENT: Negative for congestion and sore throat.    Respiratory: Negative for cough, choking, chest tightness, shortness of breath and wheezing.    Cardiovascular: Negative for chest pain and leg swelling.   Gastrointestinal: Positive for abdominal pain and nausea. Negative for abdominal distention, diarrhea and vomiting.   All other systems reviewed and are negative.      Past Medical History:   Diagnosis Date   • Anxiety    • Arthritis    • Asthma    • BiPAP (biphasic positive airway pressure) dependence    • Brachial neuritis 1/19/2017   • Chest pain    • CHF (congestive heart failure)    • COPD (chronic obstructive pulmonary disease)    • Coronary artery disease    • Depression    • Diabetes mellitus    • Dizziness    • Edema    • GERD (gastroesophageal reflux disease)    • H/O chest x-ray 07/04/2015    Mild Left base atelectasis   • H/O echocardiogram 07/05/2015    Normal LVSF. EF of 60-65%. Grade 1 diastolic dysfunction of the LV myocardium. No evidence of pericardial effusion   • H/O exercise stress test    • History of herniated intervertebral disc     History of left L5-S1 disc herniation   • Hypertension    • LOM (loss of memory) 1/19/2017   • Lower back pain     Right   • Measles    • Obstructive sleep apnea     • Palpitations    • Pericardial effusion    • Seizure disorder    • Shortness of breath 1/19/2017   • SOB (shortness of breath)    • Stroke    • Wears glasses        Allergies   Allergen Reactions   • Sulfa Antibiotics Anaphylaxis, Nausea And Vomiting and Delirium   • Codeine Nausea And Vomiting     Can only take with Phenergan   • Invokana [Canagliflozin]      DKA   • Morphine      Does not work. It causes pain       Past Surgical History:   Procedure Laterality Date   • BACK SURGERY     • CARDIAC CATHETERIZATION     • CARDIAC CATHETERIZATION N/A 8/11/2017    Procedure: Coronary angiography;  Surgeon: Dallas Kim MD;  Location: [facility] CATH INVASIVE LOCATION;  Service:    • CARDIAC CATHETERIZATION N/A 8/11/2017    Procedure: Left Heart Cath;  Surgeon: Dallas Kim MD;  Location: [facility] CATH INVASIVE LOCATION;  Service:    • CARDIAC CATHETERIZATION N/A 8/11/2017    Procedure: Left ventriculography;  Surgeon: Dallas Kim MD;  Location: [facility] CATH INVASIVE LOCATION;  Service:    • CARDIOVASCULAR STRESS TEST  07/03/2017    WITH DR KIM AT [facility]   • CARPAL TUNNEL RELEASE Right    • CATARACT EXTRACTION W/ INTRAOCULAR LENS IMPLANT Right 6/12/2017    Procedure: CATARACT PHACO EXTRACTION WITH INTRAOCULAR LENS IMPLANT RIGHT ;  Surgeon: Natalie Cao MD;  Location: [facility] OR;  Service:    • CATARACT EXTRACTION W/ INTRAOCULAR LENS IMPLANT Left 7/10/2017    Procedure: CATARACT PHACO EXTRACTION WITH INTRAOCULAR LENS IMPLANT LEFT;  Surgeon: Natalie Cao MD;  Location: [facility] OR;  Service:    • CHOLECYSTECTOMY     • CHOLECYSTECTOMY     • COLONOSCOPY     • CORONARY ANGIOPLASTY WITH STENT PLACEMENT     • CORONARY ANGIOPLASTY WITH STENT PLACEMENT      X1-LAD   • CORONARY ARTERY BYPASS GRAFT N/A 8/15/2017    Procedure: CORONARY ARTERY BYPASS GRAFTING x 3 UTILIZING THE LEFT INTERNAL MAMMARY ARTERY WITH ENDOSCOPIC VEIN HARVESTING OF THE RIGHT GREATER SAPHENOUS VEIN, HAMLET, LAD ENDARECTOMY;  Surgeon: London Damon,  MD;  Location:  [facility] OR;  Service:    • ENDOSCOPY     • EYE SURGERY      cataract surgery both eyes   • KNEE ARTHROSCOPY Bilateral    • NEUROPLASTY / TRANSPOSITION ULNAR NERVE AT ELBOW     • OTHER SURGICAL HISTORY      Foraminotomy and discectomy   • PERICARDIAL WINDOW N/A 8/25/2017    Procedure: PERICARDIAL WINDOW;  Surgeon: Freeman Phillips MD;  Location:  [facility] OR;  Service:        Family History   Problem Relation Age of Onset   • Hypertension Mother    • Arthritis Mother    • Alcohol abuse Father    • Heart disease Other    • Stroke Other    • Hypertension Other    • Other Other      Neurologic disorder   • Parkinsonism Other    • Stroke Other    • Heart disease Other    • Hypertension Other    • Heart disease Other    • Stroke Other    • Hypertension Other        Social History     Social History   • Marital status:    • Number of children: 1     Occupational History   • [employer]      Disabled since 2002-Back Problems     Social History Main Topics   • Smoking status: Former Smoker     Packs/day: 1.00     Years: 10.00     Types: Cigarettes     Quit date: 1/1/1998   • Smokeless tobacco: Former User     Types: Snuff     Quit date: 5/24/2017   • Alcohol use Yes      Comment: 3 PER YEAR   • Drug use: No   • Sexual activity: Defer     Other Topics Concern   • Not on file     Social History Narrative    Caffeine use: 0 serving daily.    Patient lives at home with wife.                Objective   Physical Exam   Constitutional: He is oriented to person, place, and time. He appears well-developed and well-nourished.   HENT:   Head: Normocephalic and atraumatic.   Right Ear: External ear normal.   Left Ear: External ear normal.   Nose: Nose normal.   Mouth/Throat: Oropharynx is clear and moist.   Eyes: Conjunctivae and EOM are normal. Pupils are equal, round, and reactive to light.   Neck: Normal range of motion. Neck supple.   Cardiovascular: Normal rate, regular rhythm, normal heart sounds  and intact distal pulses.    Pulmonary/Chest: Effort normal and breath sounds normal.   Abdominal: Soft. Bowel sounds are normal. There is tenderness.   Musculoskeletal: Normal range of motion.   Neurological: He is alert and oriented to person, place, and time.   Skin: Skin is warm and dry.   Psychiatric: He has a normal mood and affect. His behavior is normal. Judgment normal.       Procedures         ED Course  ED Course   Comment By Time   Labs reassuring and cw last two lab draws.    He has had two recent neg CTs.     He feels some better. Will give gi fu. Well aware of the ss of worsening condition. ANTONIO Patel 04/22 1948                  [facility]    Final diagnoses:   Acute abdominal pain   Abdominal colic            ANTONIO Patel  04/22/18 1952       ANTONIO Patel  04/22/18 1953

## 2018-05-01 RX ORDER — GABAPENTIN 300 MG/1
CAPSULE ORAL
Qty: 60 CAPSULE | Refills: 0 | OUTPATIENT
Start: 2018-05-01

## 2018-05-01 NOTE — TELEPHONE ENCOUNTER
Patient no longer under provider care. Patient was advised that he needed to contact [facility].

## 2018-05-09 ENCOUNTER — OFFICE VISIT (OUTPATIENT)
Dept: NEUROLOGY | Facility: CLINIC | Age: 58
End: 2018-05-09

## 2018-05-09 VITALS
HEIGHT: 70 IN | DIASTOLIC BLOOD PRESSURE: 74 MMHG | SYSTOLIC BLOOD PRESSURE: 126 MMHG | BODY MASS INDEX: 41.09 KG/M2 | WEIGHT: 287 LBS

## 2018-05-09 DIAGNOSIS — G89.29 CHRONIC INTRACTABLE HEADACHE, UNSPECIFIED HEADACHE TYPE: Primary | ICD-10-CM

## 2018-05-09 DIAGNOSIS — R51.9 CHRONIC INTRACTABLE HEADACHE, UNSPECIFIED HEADACHE TYPE: Primary | ICD-10-CM

## 2018-05-09 PROCEDURE — 99214 OFFICE O/P EST MOD 30 MIN: CPT | Performed by: PHYSICIAN ASSISTANT

## 2018-05-09 RX ORDER — TOPIRAMATE 100 MG/1
100 TABLET, FILM COATED ORAL DAILY
Qty: 30 TABLET | Refills: 11 | Status: ON HOLD | OUTPATIENT
Start: 2018-05-09 | End: 2019-05-22

## 2018-05-09 RX ORDER — AMITRIPTYLINE HYDROCHLORIDE 25 MG/1
75 TABLET, FILM COATED ORAL NIGHTLY
Qty: 90 TABLET | Refills: 11 | Status: SHIPPED | OUTPATIENT
Start: 2018-05-09 | End: 2019-03-18 | Stop reason: SDUPTHER

## 2018-05-09 RX ORDER — GLYBURIDE-METFORMIN HYDROCHLORIDE 5; 500 MG/1; MG/1
1 TABLET ORAL
COMMUNITY
End: 2018-08-19

## 2018-05-09 NOTE — PROGRESS NOTES
Subjective     Chief Complaint: headaches      History of Present Illness   Denzel Harding is a 58 y.o. male who returns to clinic today for evaluation of headaches. He was previously followed by Dr. Shoemaker. He has noted symptoms for several years marked by a constant stabbing right frontal headache. He notes associated light and sound sensitivity. He also notes associated blurry vision and visual halos. He denies any modifying factors.      He has been hospitalized at [facility] several times over the last year. He was most recently hospitalized in 1/18 for a headache as well as left sided numbness and weakness. He also noted associated dizziness. An MRI of the brain at this time was unremarkable. A CTA of the head and neck was notable for stenotic plaque in the MCA vessels bilaterally, though was otherwise essentially unremarkable. An echo was unremarkable. He was treated with indomethacin, which was somewhat beneficial.      He was also hospitalized at [facility] in 12/17 for left sided weakness, slurred speech, and word finding difficulties. He noted associated left facial weakness. He was treated with IV tPA. He was also treated with ASA 325mg and Lipitor 80mg daily. However, his Lipitor was recently decreased to 40mg daily per neurosurgery.     He is currently taking amitriptyline 75mg nightly, TPM 100mg nightly and GBP 300mg BID. He was unable to tolerate higher doses of both of these medications. He was also unable to tolerate Depakote.     Since his last visit in 3/18, he notes that his headaches have resolved.       I have reviewed and confirmed the past family, social and medical history as accurate on 5/9/18.     Review of Systems   Constitutional: Negative.    HENT: Negative.    Eyes: Negative.    Respiratory: Negative.    Cardiovascular: Negative.    Gastrointestinal: Negative.    Endocrine: Negative.    Genitourinary: Negative.    Musculoskeletal: Negative.    Skin: Negative.    Allergic/Immunologic: Negative.   "  Hematological: Negative.    Psychiatric/Behavioral: Negative.        Objective     /74 (BP Location: Right arm, Patient Position: Sitting, Cuff Size: Adult)   Ht 177.8 cm (70\")   Wt 130 kg (287 lb)   BMI 41.18 kg/m²     General appearance today is normal.       Physical Exam   Neurological: He has normal strength. He has a normal Finger-Nose-Finger Test.   Psychiatric: His speech is normal.        Neurologic Exam     Mental Status   Speech: speech is normal   Level of consciousness: alert  Normal comprehension.     Cranial Nerves   Cranial nerves II through XII intact.     Motor Exam   Muscle bulk: normal  Overall muscle tone: normal    Strength   Strength 5/5 throughout.     Sensory Exam   Light touch normal.     Gait, Coordination, and Reflexes     Coordination   Finger to nose coordination: normal    Tremor   Resting tremor: absent          Assessment/Plan   Denzel was seen today for headache.    Diagnoses and all orders for this visit:    Chronic intractable headache, unspecified headache type    Other orders  -     topiramate (TOPAMAX) 100 MG tablet; Take 1 tablet by mouth Daily.  -     amitriptyline (ELAVIL) 25 MG tablet; Take 3 tablets by mouth Every Night.          Discussion/Summary   Denzel Harding returns to clinic today for evaluation of headaches. The etiology is unclear, though I am somewhat concerning that his symptoms are potentially related to migraines. This was discussed in detail. I again reviewed his current status and treatment options. After discussing potential treatment options, it was elected to continue on amitriptyline, topiramate, and gabapentin unchanged. He will then follow up in 6 months, or sooner if needed.       I spent 15 minutes out of 25 minutes face to face with the patient and family and discussing diagnosis, evaluation, current status, treatment options and management.    As part of this visit I obtained additional history from the family which is incorporated in " the HPI.      Lynnette Gamble PA-C

## 2018-05-13 ENCOUNTER — APPOINTMENT (OUTPATIENT)
Dept: GENERAL RADIOLOGY | Facility: HOSPITAL | Age: 58
End: 2018-05-13

## 2018-05-13 ENCOUNTER — HOSPITAL ENCOUNTER (EMERGENCY)
Facility: HOSPITAL | Age: 58
Discharge: HOME | End: 2018-05-13
Attending: EMERGENCY MEDICINE | Admitting: EMERGENCY MEDICINE

## 2018-05-13 VITALS
HEIGHT: 70 IN | BODY MASS INDEX: 40.8 KG/M2 | OXYGEN SATURATION: 95 % | RESPIRATION RATE: 16 BRPM | WEIGHT: 285 LBS | TEMPERATURE: 97.9 F | DIASTOLIC BLOOD PRESSURE: 69 MMHG | HEART RATE: 82 BPM | SYSTOLIC BLOOD PRESSURE: 109 MMHG

## 2018-05-13 DIAGNOSIS — R10.84 GENERALIZED ABDOMINAL PAIN: Primary | ICD-10-CM

## 2018-05-13 DIAGNOSIS — R73.9 HYPERGLYCEMIA: ICD-10-CM

## 2018-05-13 LAB
ALBUMIN SERPL-MCNC: 4 G/DL (ref 3.2–4.8)
ALBUMIN/GLOB SERPL: 1.5 G/DL (ref 1.5–2.5)
ALP SERPL-CCNC: 89 U/L (ref 25–100)
ALT SERPL W P-5'-P-CCNC: 46 U/L (ref 7–40)
ANION GAP SERPL CALCULATED.3IONS-SCNC: 5 MMOL/L (ref 3–11)
AST SERPL-CCNC: 29 U/L (ref 0–33)
BASOPHILS # BLD AUTO: 0.03 10*3/MM3 (ref 0–0.2)
BASOPHILS NFR BLD AUTO: 0.4 % (ref 0–1)
BILIRUB SERPL-MCNC: 0.6 MG/DL (ref 0.3–1.2)
BILIRUB UR QL STRIP: NEGATIVE
BUN BLD-MCNC: 10 MG/DL (ref 9–23)
BUN/CREAT SERPL: 11.1 (ref 7–25)
CALCIUM SPEC-SCNC: 9.4 MG/DL (ref 8.7–10.4)
CHLORIDE SERPL-SCNC: 108 MMOL/L (ref 99–109)
CLARITY UR: CLEAR
CO2 SERPL-SCNC: 26 MMOL/L (ref 20–31)
COLOR UR: YELLOW
CREAT BLD-MCNC: 0.9 MG/DL (ref 0.6–1.3)
DEPRECATED RDW RBC AUTO: 41.7 FL (ref 37–54)
EOSINOPHIL # BLD AUTO: 0.22 10*3/MM3 (ref 0–0.3)
EOSINOPHIL NFR BLD AUTO: 3.1 % (ref 0–3)
ERYTHROCYTE [DISTWIDTH] IN BLOOD BY AUTOMATED COUNT: 14.7 % (ref 11.3–14.5)
GFR SERPL CREATININE-BSD FRML MDRD: 87 ML/MIN/1.73
GLOBULIN UR ELPH-MCNC: 2.7 GM/DL
GLUCOSE BLD-MCNC: 179 MG/DL (ref 70–100)
GLUCOSE UR STRIP-MCNC: NEGATIVE MG/DL
HCT VFR BLD AUTO: 39 % (ref 38.9–50.9)
HGB BLD-MCNC: 13.2 G/DL (ref 13.1–17.5)
HGB UR QL STRIP.AUTO: NEGATIVE
HOLD SPECIMEN: NORMAL
HOLD SPECIMEN: NORMAL
IMM GRANULOCYTES # BLD: 0.05 10*3/MM3 (ref 0–0.03)
IMM GRANULOCYTES NFR BLD: 0.7 % (ref 0–0.6)
KETONES UR QL STRIP: NEGATIVE
LEUKOCYTE ESTERASE UR QL STRIP.AUTO: NEGATIVE
LIPASE SERPL-CCNC: 28 U/L (ref 6–51)
LYMPHOCYTES # BLD AUTO: 1.75 10*3/MM3 (ref 0.6–4.8)
LYMPHOCYTES NFR BLD AUTO: 24.8 % (ref 24–44)
MCH RBC QN AUTO: 26.1 PG (ref 27–31)
MCHC RBC AUTO-ENTMCNC: 33.8 G/DL (ref 32–36)
MCV RBC AUTO: 77.2 FL (ref 80–99)
MONOCYTES # BLD AUTO: 0.41 10*3/MM3 (ref 0–1)
MONOCYTES NFR BLD AUTO: 5.8 % (ref 0–12)
NEUTROPHILS # BLD AUTO: 4.6 10*3/MM3 (ref 1.5–8.3)
NEUTROPHILS NFR BLD AUTO: 65.2 % (ref 41–71)
NITRITE UR QL STRIP: NEGATIVE
PH UR STRIP.AUTO: <=5 [PH] (ref 5–8)
PLATELET # BLD AUTO: 176 10*3/MM3 (ref 150–450)
PMV BLD AUTO: 9.9 FL (ref 6–12)
POTASSIUM BLD-SCNC: 3.9 MMOL/L (ref 3.5–5.5)
PROT SERPL-MCNC: 6.7 G/DL (ref 5.7–8.2)
PROT UR QL STRIP: NEGATIVE
RBC # BLD AUTO: 5.05 10*6/MM3 (ref 4.2–5.76)
SODIUM BLD-SCNC: 139 MMOL/L (ref 132–146)
SP GR UR STRIP: <=1.005 (ref 1–1.03)
UROBILINOGEN UR QL STRIP: NORMAL
WBC NRBC COR # BLD: 7.06 10*3/MM3 (ref 3.5–10.8)
WHOLE BLOOD HOLD SPECIMEN: NORMAL
WHOLE BLOOD HOLD SPECIMEN: NORMAL

## 2018-05-13 PROCEDURE — 25010000002 ONDANSETRON PER 1 MG: Performed by: PHYSICIAN ASSISTANT

## 2018-05-13 PROCEDURE — 25010000002 HYDROMORPHONE PER 4 MG: Performed by: EMERGENCY MEDICINE

## 2018-05-13 PROCEDURE — 96374 THER/PROPH/DIAG INJ IV PUSH: CPT

## 2018-05-13 PROCEDURE — 83690 ASSAY OF LIPASE: CPT | Performed by: EMERGENCY MEDICINE

## 2018-05-13 PROCEDURE — 96375 TX/PRO/DX INJ NEW DRUG ADDON: CPT

## 2018-05-13 PROCEDURE — 99284 EMERGENCY DEPT VISIT MOD MDM: CPT

## 2018-05-13 PROCEDURE — 80053 COMPREHEN METABOLIC PANEL: CPT | Performed by: EMERGENCY MEDICINE

## 2018-05-13 PROCEDURE — 74018 RADEX ABDOMEN 1 VIEW: CPT

## 2018-05-13 PROCEDURE — 85025 COMPLETE CBC W/AUTO DIFF WBC: CPT | Performed by: EMERGENCY MEDICINE

## 2018-05-13 PROCEDURE — 81003 URINALYSIS AUTO W/O SCOPE: CPT | Performed by: EMERGENCY MEDICINE

## 2018-05-13 RX ORDER — SODIUM CHLORIDE 0.9 % (FLUSH) 0.9 %
10 SYRINGE (ML) INJECTION AS NEEDED
Status: DISCONTINUED | OUTPATIENT
Start: 2018-05-13 | End: 2018-05-13 | Stop reason: HOSPADM

## 2018-05-13 RX ORDER — METOCLOPRAMIDE 5 MG/1
5 TABLET ORAL 3 TIMES DAILY PRN
Qty: 21 TABLET | Refills: 0 | Status: SHIPPED | OUTPATIENT
Start: 2018-05-13 | End: 2018-06-18

## 2018-05-13 RX ORDER — ONDANSETRON 2 MG/ML
4 INJECTION INTRAMUSCULAR; INTRAVENOUS ONCE
Status: COMPLETED | OUTPATIENT
Start: 2018-05-13 | End: 2018-05-13

## 2018-05-13 RX ORDER — HYDROMORPHONE HYDROCHLORIDE 1 MG/ML
0.5 INJECTION, SOLUTION INTRAMUSCULAR; INTRAVENOUS; SUBCUTANEOUS ONCE
Status: COMPLETED | OUTPATIENT
Start: 2018-05-13 | End: 2018-05-13

## 2018-05-13 RX ADMIN — Medication 10 ML: at 16:25

## 2018-05-13 RX ADMIN — Medication 10 ML: at 15:32

## 2018-05-13 RX ADMIN — ONDANSETRON 4 MG: 2 INJECTION INTRAMUSCULAR; INTRAVENOUS at 16:24

## 2018-05-13 RX ADMIN — HYDROMORPHONE HYDROCHLORIDE 0.5 MG: 1 INJECTION, SOLUTION INTRAMUSCULAR; INTRAVENOUS; SUBCUTANEOUS at 16:24

## 2018-05-13 NOTE — ED PROVIDER NOTES
Subjective   58-year-old male presents to emergency department with complaints of diffuse abdominal pain, nausea and diarrhea.  The diarrhea started last night.  The patient states that he has had intermittent abdominal pain off and on for the past month.  The patient was seen here about 3 weeks ago and had a CT that was unremarkable.  He was referred to gastroenterology and states that he has no appointment on June 21 with a gastroenterologist in [facility].  The patient also states that he has had some decreased urine output in the past 2 days.  Past medical history of hypertension, insulin-dependent diabetes type 2, CABG ×3 last year, history of DKA last year and cholecystectomy in 2009.  His PCP is Ottoniel Pink.  The patient is a nonsmoker.  He occasionally drinks a beer.  No drug use.        History provided by:  Patient  Abdominal Pain   Pain location:  Generalized  Pain quality: aching    Pain radiates to:  Does not radiate  Pain severity:  Moderate  Duration: onset of diarrhea at midnight last night.  Abdominal pain off/on for a month.  Timing:  Constant  Progression:  Waxing and waning  Chronicity:  New  Context: not alcohol use    Relieved by:  Nothing  Worsened by:  Nothing  Ineffective treatments:  None tried  Associated symptoms: diarrhea and nausea    Associated symptoms: no anorexia, no chest pain, no chills, no constipation, no cough, no dysuria, no fever, no hematuria, no melena, no shortness of breath, no sore throat and no vomiting        Review of Systems   Constitutional: Negative for chills and fever.   HENT: Negative for sore throat.    Eyes: Negative for visual disturbance.   Respiratory: Negative for cough and shortness of breath.    Cardiovascular: Negative for chest pain.   Gastrointestinal: Positive for abdominal pain, diarrhea and nausea. Negative for anorexia, constipation, melena and vomiting.   Endocrine: Negative for polydipsia, polyphagia and polyuria.   Genitourinary:  Positive for decreased urine volume. Negative for dysuria and hematuria.   Musculoskeletal: Negative for back pain.   Skin: Negative for rash.   Allergic/Immunologic: Negative for immunocompromised state.   Neurological: Negative for dizziness, weakness, light-headedness and headaches.   Hematological: Negative.    Psychiatric/Behavioral: Negative.        Past Medical History:   Diagnosis Date   • Anxiety    • Arthritis    • Asthma    • BiPAP (biphasic positive airway pressure) dependence    • Brachial neuritis 1/19/2017   • Chest pain    • CHF (congestive heart failure)    • COPD (chronic obstructive pulmonary disease)    • Coronary artery disease    • Depression    • Diabetes mellitus    • Dizziness    • Edema    • GERD (gastroesophageal reflux disease)    • H/O chest x-ray 07/04/2015    Mild Left base atelectasis   • H/O echocardiogram 07/05/2015    Normal LVSF. EF of 60-65%. Grade 1 diastolic dysfunction of the LV myocardium. No evidence of pericardial effusion   • H/O exercise stress test    • History of herniated intervertebral disc     History of left L5-S1 disc herniation   • Hypertension    • LOM (loss of memory) 1/19/2017   • Lower back pain     Right   • Measles    • Obstructive sleep apnea    • Palpitations    • Pericardial effusion    • Seizure disorder    • Shortness of breath 1/19/2017   • SOB (shortness of breath)    • Stroke    • Wears glasses        Allergies   Allergen Reactions   • Sulfa Antibiotics Anaphylaxis, Nausea And Vomiting and Delirium   • Codeine Nausea And Vomiting     Can only take with Phenergan   • Invokana [Canagliflozin]      DKA   • Morphine      Does not work. It causes pain       Past Surgical History:   Procedure Laterality Date   • BACK SURGERY     • CARDIAC CATHETERIZATION     • CARDIAC CATHETERIZATION N/A 8/11/2017    Procedure: Coronary angiography;  Surgeon: Dallas Kim MD;  Location: [facility] CATH INVASIVE LOCATION;  Service:    • CARDIAC CATHETERIZATION N/A  8/11/2017    Procedure: Left Heart Cath;  Surgeon: Dallas Hernandez MD;  Location: [facility] CATH INVASIVE LOCATION;  Service:    • CARDIAC CATHETERIZATION N/A 8/11/2017    Procedure: Left ventriculography;  Surgeon: Dallas Hernandez MD;  Location: [facility] CATH INVASIVE LOCATION;  Service:    • CARDIOVASCULAR STRESS TEST  07/03/2017    WITH DR HERNANDEZ AT [facility]   • CARPAL TUNNEL RELEASE Right    • CATARACT EXTRACTION W/ INTRAOCULAR LENS IMPLANT Right 6/12/2017    Procedure: CATARACT PHACO EXTRACTION WITH INTRAOCULAR LENS IMPLANT RIGHT ;  Surgeon: Natalie Cao MD;  Location: [facility] OR;  Service:    • CATARACT EXTRACTION W/ INTRAOCULAR LENS IMPLANT Left 7/10/2017    Procedure: CATARACT PHACO EXTRACTION WITH INTRAOCULAR LENS IMPLANT LEFT;  Surgeon: Natalie Cao MD;  Location: [facility] OR;  Service:    • CHOLECYSTECTOMY     • CHOLECYSTECTOMY     • COLONOSCOPY     • CORONARY ANGIOPLASTY WITH STENT PLACEMENT     • CORONARY ANGIOPLASTY WITH STENT PLACEMENT      X1-LAD   • CORONARY ARTERY BYPASS GRAFT N/A 8/15/2017    Procedure: CORONARY ARTERY BYPASS GRAFTING x 3 UTILIZING THE LEFT INTERNAL MAMMARY ARTERY WITH ENDOSCOPIC VEIN HARVESTING OF THE RIGHT GREATER SAPHENOUS VEIN, HAMLET, LAD ENDARECTOMY;  Surgeon: London Damon MD;  Location:  [facility] OR;  Service:    • ENDOSCOPY     • EYE SURGERY      cataract surgery both eyes   • KNEE ARTHROSCOPY Bilateral    • NEUROPLASTY / TRANSPOSITION ULNAR NERVE AT ELBOW     • OTHER SURGICAL HISTORY      Foraminotomy and discectomy   • PERICARDIAL WINDOW N/A 8/25/2017    Procedure: PERICARDIAL WINDOW;  Surgeon: Freeman Phillips MD;  Location:  [facility] OR;  Service:        Family History   Problem Relation Age of Onset   • Hypertension Mother    • Arthritis Mother    • Alcohol abuse Father    • Heart disease Other    • Stroke Other    • Hypertension Other    • Other Other      Neurologic disorder   • Parkinsonism Other    • Stroke Other    • Heart disease Other    • Hypertension Other    • Heart  disease Other    • Stroke Other    • Hypertension Other        Social History     Social History   • Marital status:    • Number of children: 1     Occupational History   • [employer]      Disabled since 2002-Back Problems     Social History Main Topics   • Smoking status: Former Smoker     Packs/day: 1.00     Years: 10.00     Types: Cigarettes     Quit date: 1/1/1998   • Smokeless tobacco: Former User     Types: Snuff     Quit date: 5/24/2017   • Alcohol use Yes      Comment: 3 PER YEAR   • Drug use: No   • Sexual activity: Defer     Other Topics Concern   • Not on file     Social History Narrative    Caffeine use: 0 serving daily.    Patient lives at home with wife.                Objective   Physical Exam   Constitutional: He appears well-developed and well-nourished. No distress.   HENT:   Nose: Nose normal.   Eyes: Conjunctivae are normal. Pupils are equal, round, and reactive to light.   Neck: Normal range of motion. Neck supple.   Cardiovascular: Normal rate, regular rhythm, normal heart sounds and intact distal pulses.    Pulmonary/Chest: Effort normal and breath sounds normal.   Abdominal: Soft. Bowel sounds are normal. There is tenderness (mild to moderate diffuse abdominal pain).   Musculoskeletal: Normal range of motion. He exhibits no tenderness.   Neurological: He is alert.   Skin: Skin is warm and dry.   Psychiatric: He has a normal mood and affect.       Procedures           ED Course  ED Course   5:38 PM  The pt is feeling better after Dilaudid.  No concerning labs.  Nml UA.  NSBGP on KUB with no obstruction or free air.  I reviewed his old records and he has had two recent CTs of the abd/pelvis that were unremarkable.  He states he also had CT in [place] that was negative.  I don't think another CT is warranted at this time.  The pt states he was referred to Dr. Mills previously but when he has called, he was told they would call back but never did?  This pain may be  related to diabetic gastroparesis.  Will try him on some Reglan and will refer to Dr. Francisco and advise him to call tomorrow.  Pt advised to return to ER if pain worsens or if he has vomiting or other concerns.  Recent Results (from the past 24 hour(s))   Comprehensive Metabolic Panel    Collection Time: 05/13/18  3:32 PM   Result Value Ref Range    Glucose 179 (H) 70 - 100 mg/dL    BUN 10 9 - 23 mg/dL    Creatinine 0.90 0.60 - 1.30 mg/dL    Sodium 139 132 - 146 mmol/L    Potassium 3.9 3.5 - 5.5 mmol/L    Chloride 108 99 - 109 mmol/L    CO2 26.0 20.0 - 31.0 mmol/L    Calcium 9.4 8.7 - 10.4 mg/dL    Total Protein 6.7 5.7 - 8.2 g/dL    Albumin 4.00 3.20 - 4.80 g/dL    ALT (SGPT) 46 (H) 7 - 40 U/L    AST (SGOT) 29 0 - 33 U/L    Alkaline Phosphatase 89 25 - 100 U/L    Total Bilirubin 0.6 0.3 - 1.2 mg/dL    eGFR Non African Amer 87 >60 mL/min/1.73    Globulin 2.7 gm/dL    A/G Ratio 1.5 1.5 - 2.5 g/dL    BUN/Creatinine Ratio 11.1 7.0 - 25.0    Anion Gap 5.0 3.0 - 11.0 mmol/L   Lipase    Collection Time: 05/13/18  3:32 PM   Result Value Ref Range    Lipase 28 6 - 51 U/L   Light Blue Top    Collection Time: 05/13/18  3:32 PM   Result Value Ref Range    Extra Tube hold for add-on    Green Top (Gel)    Collection Time: 05/13/18  3:32 PM   Result Value Ref Range    Extra Tube Hold for add-ons.    Lavender Top    Collection Time: 05/13/18  3:32 PM   Result Value Ref Range    Extra Tube hold for add-on    Gold Top - SST    Collection Time: 05/13/18  3:32 PM   Result Value Ref Range    Extra Tube Hold for add-ons.    CBC Auto Differential    Collection Time: 05/13/18  3:32 PM   Result Value Ref Range    WBC 7.06 3.50 - 10.80 10*3/mm3    RBC 5.05 4.20 - 5.76 10*6/mm3    Hemoglobin 13.2 13.1 - 17.5 g/dL    Hematocrit 39.0 38.9 - 50.9 %    MCV 77.2 (L) 80.0 - 99.0 fL    MCH 26.1 (L) 27.0 - 31.0 pg    MCHC 33.8 32.0 - 36.0 g/dL    RDW 14.7 (H) 11.3 - 14.5 %    RDW-SD 41.7 37.0 - 54.0 fl    MPV 9.9 6.0 - 12.0 fL    Platelets 176 150  "- 450 10*3/mm3    Neutrophil % 65.2 41.0 - 71.0 %    Lymphocyte % 24.8 24.0 - 44.0 %    Monocyte % 5.8 0.0 - 12.0 %    Eosinophil % 3.1 (H) 0.0 - 3.0 %    Basophil % 0.4 0.0 - 1.0 %    Immature Grans % 0.7 (H) 0.0 - 0.6 %    Neutrophils, Absolute 4.60 1.50 - 8.30 10*3/mm3    Lymphocytes, Absolute 1.75 0.60 - 4.80 10*3/mm3    Monocytes, Absolute 0.41 0.00 - 1.00 10*3/mm3    Eosinophils, Absolute 0.22 0.00 - 0.30 10*3/mm3    Basophils, Absolute 0.03 0.00 - 0.20 10*3/mm3    Immature Grans, Absolute 0.05 (H) 0.00 - 0.03 10*3/mm3   Urinalysis With / Culture If Indicated - Urine, Clean Catch    Collection Time: 05/13/18  4:25 PM   Result Value Ref Range    Color, UA Yellow Yellow, Straw    Appearance, UA Clear Clear    pH, UA <=5.0 5.0 - 8.0    Specific Gravity, UA <=1.005 1.001 - 1.030    Glucose, UA Negative Negative    Ketones, UA Negative Negative    Bilirubin, UA Negative Negative    Blood, UA Negative Negative    Protein, UA Negative Negative    Leuk Esterase, UA Negative Negative    Nitrite, UA Negative Negative    Urobilinogen, UA 1.0 E.U./dL 0.2 - 1.0 E.U./dL     Note: In addition to lab results from this visit, the labs listed above may include labs taken at another facility or during a different encounter within the last 24 hours. Please correlate lab times with ED admission and discharge times for further clarification of the services performed during this visit.    XR Abdomen KUB   Preliminary Result   Nonspecific nonobstructive bowel gas pattern without gross   intraperitoneal free air.       DICTATED:     05/13/2018   EDITED/ls :     05/13/2018             Vitals:    05/13/18 1432 05/13/18 1623   BP: 140/68 114/78   BP Location: Left arm    Patient Position: Sitting    Pulse: 87 85   Resp: 16 16   Temp: 97.9 °F (36.6 °C)    TempSrc: Oral    SpO2: 96% 94%   Weight: 129 kg (285 lb)    Height: 177.8 cm (70\")      Medications   sodium chloride 0.9 % flush 10 mL (10 mL Intravenous Given 5/13/18 3934) "   HYDROmorphone (DILAUDID) injection 0.5 mg (0.5 mg Intravenous Given 5/13/18 1624)   ondansetron (ZOFRAN) injection 4 mg (4 mg Intravenous Given 5/13/18 1624)     ECG/EMG Results (last 24 hours)     ** No results found for the last 24 hours. **                       [facility]      Final diagnoses:   Generalized abdominal pain   Hyperglycemia            LARRY Martinez  05/13/18 4439

## 2018-05-13 NOTE — DISCHARGE INSTRUCTIONS
Reglan as prescribed.     Please call ASAP to arrange outpatient follow up with one of the following gastroenterologists:    Shahriar Francisco MD or Mohamud Llanes MD  [address]  [place] [address] [phone]    Or    If unable to make a timely appointment with Dr. Francisco or Dr. Llanes, please follow up with one of these gastroenterologists:    Jesus Capellan MD or Archie Galindo MD  [address]  [id number]  [place] [address] [phone]    If follow up with these specialists cannot be arranged soon, please also follow up with a primary care provider, next available.    Return to ER if worse.

## 2018-05-15 ENCOUNTER — OFFICE VISIT (OUTPATIENT)
Dept: GASTROENTEROLOGY | Facility: CLINIC | Age: 58
End: 2018-05-15

## 2018-05-15 VITALS
BODY MASS INDEX: 42.63 KG/M2 | HEART RATE: 82 BPM | WEIGHT: 297.8 LBS | SYSTOLIC BLOOD PRESSURE: 112 MMHG | OXYGEN SATURATION: 98 % | HEIGHT: 70 IN | TEMPERATURE: 97.4 F | DIASTOLIC BLOOD PRESSURE: 78 MMHG

## 2018-05-15 DIAGNOSIS — E66.01 MORBID OBESITY WITH BMI OF 40.0-44.9, ADULT (HCC): ICD-10-CM

## 2018-05-15 DIAGNOSIS — R63.5 ABNORMAL WEIGHT GAIN: ICD-10-CM

## 2018-05-15 DIAGNOSIS — Z79.82 ASPIRIN LONG-TERM USE: ICD-10-CM

## 2018-05-15 DIAGNOSIS — K59.09 CHRONIC CONSTIPATION: Primary | ICD-10-CM

## 2018-05-15 PROCEDURE — 99214 OFFICE O/P EST MOD 30 MIN: CPT | Performed by: NURSE PRACTITIONER

## 2018-05-15 NOTE — PROGRESS NOTES
GASTROENTEROLOGY OUTPATIENT NEW PATIENT NOTE  Patient: DENZEL HASSAN : 1960  Date of Service: 05/15/2018  Dear Dr.David BERE Toledo MD   Thank you for your referral of this patient for evaluation of abd pain  CC: Abdominal Pain  Assessment/Plan                                             ASSESSMENT & PLANS     Chronic constipation with occasional paradoxical diarrhea.  Likely to be r/t to meds (gabapentin  -     Start linaclotide (LINZESS) 290 MCG capsule capsule; Take 1 capsule by mouth Every Morning. Administer at least 30 minutes before the first meal of the day on an empty stomach;samples of 8 pills given.  Patient is encouraged to call me and let me know if medication works for him and that I can send in a prescription.  · High-fiber diet, w/ no more than 2 servings of red meat per week.  Drink three to four 20-oz bottles of water per day. Written instructions given.  · Obtain outside records of most recent colonoscopy (Dr Danielle Park at [facility])    Abnormal weight gain Gained 50 lbs w/in 6 months.    Morbid obesity with BMI of 40.0-44.9, adult.  Chronic type 2 diabetes ×20 years  · Pt is encouraged to follow up with his endocrinologist as uncontrolled diabetes can worsen GI symptoms (such as gastroparesis, diabetic diarrhea, etc.)    Aspirin long-term use.  Pt is encouraged to observe for bright red blood or melena    Follow Up:Return in about 2 months (around 7/15/2018) for Follow Up w/ Sara.      DISCUSSION: The above plan was delineated in details with patient and all questions and concerns were answered.  Patient is also given contact information.  Patient is to return as scheduled or sooner if new problems arise  Subjective                                                     SUBJECTIVE   History of Present Illness  Mr. Denzel Hassan is a 58 y.o. male presents to the clinic today for an evaluation of Abdominal Pain    Onset 2018. Constant    Location Lower abd       Triggers  Worsens by Worsens w/ eating and stooping/bending    Severity/Quality/  Frequency Dull ache and sometimes stabbing    Relieving factors  Having a BM does not make pain better.      Associating Sx  + and - Nausea without vomiting.  Abdominal distention    BM once every day or once every 4 or 5 days.  Sometimes strains.  No hemorrhoids. Pt denies dark black stools or bright red blood in the stools, in the toilet bowl, or on the toilet tissue.   [place] 3 or 4 usually. Sometimes [place] 2.  Sometimes [place] 7     Prior Dx workup Went to ER [facility]  Went to ER here x 2    Most recent colonoscopy about 4 yrs ago and reportedly no polyps (Dr Danielle Park at [facility]), but hx of polyp prior to most recent colonoscopy     Prior Tx Reglan given recently by ER w/ some sx relief, but not much.  Linzess (not sure of dose) x 1 and able to have a BM response.      Pertinent Hx DM2 x 20 yrs in 1998 (A1C in 9-10 range)      Risk factors  Seldom NSAIDS. No narcotic  Gained 50 lbs w/in 6 months.    Pt  reports that he quit smoking about 20 years ago. His smoking use included Cigarettes. He has a 10.00 pack-year smoking history. He quit smokeless tobacco use about a year ago. Seldom ETOH  Body mass index is 42.73 kg/m².     ROS:Review of Systems   Constitutional: Positive for activity change, appetite change and unexpected weight change (gain). Negative for fatigue and fever.   HENT: Negative for hearing loss, trouble swallowing and voice change.    Eyes: Negative for visual disturbance.   Respiratory: Positive for cough and shortness of breath. Negative for choking and chest tightness.    Cardiovascular: Negative for chest pain.   Gastrointestinal: Positive for abdominal distention, abdominal pain, constipation, diarrhea and nausea. Negative for anal bleeding, blood in stool, rectal pain and vomiting.   Endocrine: Negative for polydipsia and polyphagia.   Genitourinary: Negative.    Musculoskeletal:  Positive for gait problem. Negative for joint swelling.   Skin: Negative for color change (pale) and rash.   Allergic/Immunologic: Negative for food allergies.   Neurological: Positive for dizziness. Negative for seizures and speech difficulty.   Hematological: Negative for adenopathy.   Psychiatric/Behavioral: Negative for confusion.     Subjective   PAST MED HX: Pt  has a past medical history of Anxiety; Arthritis; Asthma; BiPAP (biphasic positive airway pressure) dependence; Brachial neuritis (1/19/2017); Chest pain; CHF (congestive heart failure); COPD (chronic obstructive pulmonary disease); Coronary artery disease; Depression; Diabetes mellitus; Dizziness; Edema; GERD (gastroesophageal reflux disease); H/O chest x-ray (07/04/2015); H/O echocardiogram (07/05/2015); H/O exercise stress test; History of herniated intervertebral disc; Hypertension; LOM (loss of memory) (1/19/2017); Lower back pain; Measles; Obstructive sleep apnea; Palpitations; Pericardial effusion; Seizure disorder; Shortness of breath (1/19/2017); SOB (shortness of breath); Stroke; and Wears glasses.  PAST SURG HX: Pt  has a past surgical history that includes Coronary angioplasty with stent; Cholecystectomy; Knee Arthroscopy (Bilateral); Carpal tunnel release (Right); Neuroplasty / transposition ulnar nerve at elbow; Cardiac catheterization; Other surgical history; Coronary angioplasty with stent; Colonoscopy; Esophagogastroduodenoscopy; Back surgery; Cataract extraction w/ intraocular lens implant (Right, 6/12/2017); Cardiovascular stress test (07/03/2017); Cataract extraction w/ intraocular lens implant (Left, 7/10/2017); Eye surgery; Cardiac catheterization (N/A, 8/11/2017); Cardiac catheterization (N/A, 8/11/2017); Cardiac catheterization (N/A, 8/11/2017); Coronary artery bypass graft (N/A, 8/15/2017); Pericardial window (N/A, 8/25/2017); and Cholecystectomy.  FAM HX: family history includes Alcohol abuse in his father; Arthritis in his  mother; Heart disease in his other, other, and other; Hypertension in his mother, other, other, and other; Other in his other; Parkinsonism in his other; Stroke in his other, other, and other.  SOC HX: Pt  reports that he quit smoking about 20 years ago. His smoking use included Cigarettes. He has a 10.00 pack-year smoking history. He quit smokeless tobacco use about a year ago. His smokeless tobacco use included Snuff. He reports that he drinks alcohol. He reports that he does not use drugs.  Objective                                                           OBJECTIVE   Allergy: Pt is allergic to sulfa antibiotics; codeine; invokana [canagliflozin]; and morphine.  MEDS: •  albuterol (PROVENTIL HFA;VENTOLIN HFA) 108 (90 BASE) MCG/ACT inhaler, Inhale 2 puffs Every 4 (Four) Hours As Needed. ProAir  (90 Base) MCG/ACT Inhalation Aerosol Solution; Patient Sig: ProAir  (90 Base) MCG/ACT Inhalation Aerosol Solution ; 8; 0; 05-Oct-2015; Active, Disp: , Rfl:   •  amitriptyline (ELAVIL) 25 MG tablet, Take 3 tablets by mouth Every Night., Disp: 90 tablet, Rfl: 11  •  amLODIPine (NORVASC) 5 MG tablet, TAKE 1 TABLET BY MOUTH ONCE DAILY, Disp: 90 tablet, Rfl: 1  •  aspirin 325 MG tablet, Take 1 tablet by mouth Daily., Disp: , Rfl:   •  atorvastatin (LIPITOR) 80 MG tablet, Take 1 tablet by mouth Every Night., Disp: 30 tablet, Rfl: 0  •  Cetirizine HCl 10 MG capsule, Take 1 capsule by mouth daily., Disp: , Rfl:   •  coenzyme Q10 100 MG capsule, Take 100 mg by mouth Daily., Disp: , Rfl:   •  furosemide (LASIX) 40 MG tablet, Take 40 mg by mouth Daily., Disp: , Rfl:   •  gabapentin (NEURONTIN) 300 MG capsule, take 1 capsule by mouth twice a day, Disp: 60 capsule, Rfl: 0  •  glucose blood test strip, Use as instructed, Disp: 100 each, Rfl: 12  •  glyBURIDE-metFORMIN (GLUCOVANCE) 5-500 MG per tablet, Take 1 tablet by mouth Daily With Breakfast., Disp: , Rfl:   •  HUMALOG KWIKPEN 100 UNIT/ML solution pen-injector, Inject  10 Units under the skin 3 (Three) Times a Day With Meals. Follows SSI From PCP, Disp: , Rfl: 0  •  ibuprofen (ADVIL,MOTRIN) 800 MG tablet, TAKE 1 TABLET BY MOUTH 2-3 TIMES DAILY WITH FOOD, Disp: , Rfl: 0  •  lisinopril (PRINIVIL,ZESTRIL) 40 MG tablet, Take 1 tablet by mouth daily., Disp: , Rfl:   •  metFORMIN (GLUCOPHAGE) 500 MG tablet, Take 500 mg by mouth Daily With Breakfast., Disp: , Rfl:   •  metoclopramide (REGLAN) 5 MG tablet, Take 1 tablet by mouth 3 (Three) Times a Day As Needed (abdominal pain and nausea)., Disp: 21 tablet, Rfl: 0  •  metoprolol tartrate (LOPRESSOR) 50 MG tablet, take 1 tablet by mouth twice a day, Disp: 180 tablet, Rfl: 0  •  montelukast (SINGULAIR) 10 MG tablet, take 1 tablet by mouth at bedtime, Disp: 30 tablet, Rfl: 0  •  omeprazole (PriLOSEC) 20 MG capsule, Take 1 capsule by mouth daily., Disp: , Rfl:   •  ondansetron (ZOFRAN) 4 MG tablet, Take 1 tablet by mouth Every 6 (Six) Hours As Needed for Nausea or Vomiting., Disp: 20 tablet, Rfl: 0  •  promethazine (PHENERGAN) 25 MG tablet, Take 1 tablet by mouth Every 8 (Eight) Hours As Needed for Nausea or Vomiting., Disp: 12 tablet, Rfl: 0  •   •  ranolazine (RANEXA) 500 MG 12 hr tablet, Take 1 tablet by mouth 2 (Two) Times a Day., Disp: 60 tablet, Rfl: 11  •  SITagliptin (JANUVIA) 50 MG tablet, Take 50 mg by mouth Daily., Disp: , Rfl:   •  topiramate (TOPAMAX) 100 MG tablet, Take 1 tablet by mouth Daily., Disp: 30 tablet, Rfl: 11  •  TOUJEO SOLOSTAR 300 UNIT/ML solution pen-injector, Inject 60 Units as directed Daily., Disp: , Rfl: 0  •  vitamin C (ASCORBIC ACID) 500 MG tablet, Take 500 mg by mouth Daily., Disp: , Rfl:   •  Vortioxetine HBr (TRINTELLIX) 10 MG tablet, Take 10 mg by mouth Daily., Disp: , Rfl:   •  colchicine 0.6 MG tablet, Take 1 tablet by mouth Daily., Disp: 30 tablet, Rfl: 1  •  indomethacin (INDOCIN) 50 MG capsule, Take 1 capsule by mouth 3 (Three) Times a Day With Meals., Disp: 15 capsule, Rfl: 0    EXAMINATION: XR  ABDOMEN KUB - 05/13/2018     INDICATION: Generalized abdominal pain.      COMPARISON: None.     FINDINGS: Multiple views of the abdomen reveal nonspecific  nonobstructive bowel gas pattern with moderate colonic stool burden to  the level of the rectum. Surgical clips at the GE junction. No gross  intraperitoneal free air.     IMPRESSION:  Nonspecific nonobstructive bowel gas pattern without gross  intraperitoneal free air.    PROCEDURE: CT ABDOMEN PELVIS W CONTRAST-4/20/2018     HISTORY:  LLQ pain, suspect diverticulitis     COMPARISON: April 18, 2018.     TECHNIQUE: Multiple axial CT images were obtained from the lung bases  through the pubic symphysis following the administration of Isovue 300      contrast.      FINDINGS:      ABDOMEN: There is a small right pleural effusion and bibasilar  atelectasis. The heart is normal in size. The liver is normal. The  patient is status post cholecystectomy. The spleen is unremarkable. No  adrenal mass is present.  The pancreas is normal. The kidneys are  normal. The aorta is normal in caliber. There is no free fluid or  adenopathy. The abdominal portions of the GI tract are unremarkable with  no evidence of obstruction. There is a fat-containing periumbilical  hernia.     PELVIS: The appendix is normal. There are a few scattered colonic  diverticula without CT evidence of diverticulitis.  The urinary bladder  is unremarkable. There is no significant free fluid or adenopathy.         IMPRESSION:  No evidence of acute intra-abdominal process.      Lab Results   Component Value Date    WBC 7.06 05/13/2018    HGB 13.2 05/13/2018    HGB 13.4 04/22/2018    HGB 13.5 (L) 04/20/2018    HCT 39.0 05/13/2018    HCT 40.6 04/22/2018    HCT 40.2 (L) 04/20/2018    MCV 77.2 (L) 05/13/2018    MCHC 33.8 05/13/2018     05/13/2018     04/22/2018     04/20/2018     Lab Results   Component Value Date     05/13/2018    K 3.9 05/13/2018     05/13/2018    CO2 26.0  05/13/2018    BUN 10 05/13/2018    CREATININE 0.90 05/13/2018    EGFRIFNONA 87 05/13/2018    GLUCOSE 179 (H) 05/13/2018    CALCIUM 9.4 05/13/2018    ANIONGAP 5.0 05/13/2018     Lab Results   Component Value Date    AST 29 05/13/2018    AST 35 (H) 04/22/2018    AST 29 04/20/2018    ALT 46 (H) 05/13/2018    ALT 48 (H) 04/22/2018    ALT 49 04/20/2018    ALKPHOS 89 05/13/2018    ALKPHOS 95 04/22/2018    ALKPHOS 83 04/20/2018    BILITOT 0.6 05/13/2018    PROTEINTOT 6.7 05/13/2018    ALBUMIN 4.00 05/13/2018     Lab Results   Component Value Date    LIPASE 28 05/13/2018    LIPASE 25 04/22/2018    LIPASE 51 04/20/2018    LIPASE 97 04/18/2018    LIPASE 28 04/11/2017     Lab Results   Component Value Date    TSH 4.083 04/12/2017       Lab Results   Component Value Date    HGBA1C 9.60 (H) 01/29/2018    HGBA1C 10.20 (H) 12/18/2017    HGBA1C 8.90 (H) 08/11/2017    HGBA1C 9.20 (H) 04/12/2017    TSH 4.083 04/12/2017    TSH 1.771 04/11/2017    TSH 2.350 09/14/2014     Lab Results   Component Value Date    INR 0.97 01/28/2018    INR 1.1 01/28/2018    INR 1.1 12/17/2017     No results found for: HEPAIGM, HEPBSAG, HEPBIGMCORE, HEPCAB, HEPCVIRUSABY  Wt Readings from Last 5 Encounters:   05/15/18 135 kg (297 lb 12.8 oz)   05/13/18 129 kg (285 lb)   05/09/18 130 kg (287 lb)   04/22/18 130 kg (287 lb)   04/20/18 131 kg (289 lb)   body mass index is 42.73 kg/m².,Temp: 97.4 °F (36.3 °C),BP: 112/78,Heart Rate: 82   Physical Exam  General Well developed; well nourished;appears older than stated age no acute distress.   ENT Missing dentitions. Oral mucosa pink and moist without thrush or lesions.    Neck Neck supple; trachea midline. No thyromegaly   Resp CTA; no rhonchi, rales, or wheezes.  Respiration effort normal  CV RRR; normal S1, S2; no M/R/G. No lower extremity edema  GI Abd distended, mild/moderately firm, but no rebound or guarding. hypo active bowel sounds.  No abd hernia  Skin No rash; no lesions; no bruises.  Skin turgor  normal  Musc No clubbing; no cyanosis.  Gait steady  Psych Oriented to time, place, and person.  Appropriate affect  Thank you kindly for allowing me to be part of this patient’s care.    Pt care team: Sara ALVAREZ & [facility]  05/15/1811:23 AM  [facility]--Gastroenterology  [phone]    CC: Dr.David BERE Toledo MD  [address] / [facility] [address] FAX:[phone]

## 2018-05-23 RX ORDER — METOPROLOL TARTRATE 50 MG/1
TABLET, FILM COATED ORAL
Qty: 180 TABLET | Refills: 0 | Status: SHIPPED | OUTPATIENT
Start: 2018-05-23 | End: 2018-08-20 | Stop reason: SDUPTHER

## 2018-06-05 ENCOUNTER — TELEPHONE (OUTPATIENT)
Dept: GASTROENTEROLOGY | Facility: CLINIC | Age: 58
End: 2018-06-05

## 2018-06-05 NOTE — TELEPHONE ENCOUNTER
I saw that patient is scheduled on 6/21/18 to see Dr. Noriega and an appt w/ me in July as well.  Please clarify with patient as to which GI clinic he wants to f/u.

## 2018-06-09 ENCOUNTER — APPOINTMENT (OUTPATIENT)
Dept: GENERAL RADIOLOGY | Facility: HOSPITAL | Age: 58
End: 2018-06-09

## 2018-06-09 ENCOUNTER — APPOINTMENT (OUTPATIENT)
Dept: MRI IMAGING | Facility: HOSPITAL | Age: 58
End: 2018-06-09

## 2018-06-09 ENCOUNTER — HOSPITAL ENCOUNTER (EMERGENCY)
Facility: HOSPITAL | Age: 58
Discharge: HOME | End: 2018-06-09
Attending: EMERGENCY MEDICINE | Admitting: EMERGENCY MEDICINE

## 2018-06-09 VITALS
WEIGHT: 285 LBS | HEIGHT: 70 IN | SYSTOLIC BLOOD PRESSURE: 103 MMHG | RESPIRATION RATE: 18 BRPM | TEMPERATURE: 98.2 F | HEART RATE: 74 BPM | DIASTOLIC BLOOD PRESSURE: 65 MMHG | BODY MASS INDEX: 40.8 KG/M2 | OXYGEN SATURATION: 97 %

## 2018-06-09 DIAGNOSIS — R20.2 PARESTHESIA OF LEFT UPPER EXTREMITY: ICD-10-CM

## 2018-06-09 DIAGNOSIS — H81.399 VERTIGO, PERIPHERAL, UNSPECIFIED LATERALITY: Primary | ICD-10-CM

## 2018-06-09 LAB
ALBUMIN SERPL-MCNC: 4.27 G/DL (ref 3.2–4.8)
ALBUMIN/GLOB SERPL: 1.6 G/DL (ref 1.5–2.5)
ALP SERPL-CCNC: 89 U/L (ref 25–100)
ALT SERPL W P-5'-P-CCNC: 52 U/L (ref 7–40)
ANION GAP SERPL CALCULATED.3IONS-SCNC: 7 MMOL/L (ref 3–11)
AST SERPL-CCNC: 29 U/L (ref 0–33)
BASOPHILS # BLD AUTO: 0.05 10*3/MM3 (ref 0–0.2)
BASOPHILS NFR BLD AUTO: 0.7 % (ref 0–1)
BILIRUB SERPL-MCNC: 0.4 MG/DL (ref 0.3–1.2)
BNP SERPL-MCNC: 10 PG/ML (ref 0–100)
BUN BLD-MCNC: 13 MG/DL (ref 9–23)
BUN/CREAT SERPL: 13.4 (ref 7–25)
CALCIUM SPEC-SCNC: 9.1 MG/DL (ref 8.7–10.4)
CHLORIDE SERPL-SCNC: 104 MMOL/L (ref 99–109)
CO2 SERPL-SCNC: 29 MMOL/L (ref 20–31)
CREAT BLD-MCNC: 0.97 MG/DL (ref 0.6–1.3)
DEPRECATED RDW RBC AUTO: 42.3 FL (ref 37–54)
EOSINOPHIL # BLD AUTO: 0.3 10*3/MM3 (ref 0–0.3)
EOSINOPHIL NFR BLD AUTO: 4.4 % (ref 0–3)
ERYTHROCYTE [DISTWIDTH] IN BLOOD BY AUTOMATED COUNT: 15 % (ref 11.3–14.5)
GFR SERPL CREATININE-BSD FRML MDRD: 80 ML/MIN/1.73
GLOBULIN UR ELPH-MCNC: 2.7 GM/DL
GLUCOSE BLD-MCNC: 232 MG/DL (ref 70–100)
HCT VFR BLD AUTO: 43 % (ref 38.9–50.9)
HGB BLD-MCNC: 14.4 G/DL (ref 13.1–17.5)
HOLD SPECIMEN: NORMAL
HOLD SPECIMEN: NORMAL
IMM GRANULOCYTES # BLD: 0.05 10*3/MM3 (ref 0–0.03)
IMM GRANULOCYTES NFR BLD: 0.7 % (ref 0–0.6)
LYMPHOCYTES # BLD AUTO: 1.87 10*3/MM3 (ref 0.6–4.8)
LYMPHOCYTES NFR BLD AUTO: 27.3 % (ref 24–44)
MCH RBC QN AUTO: 26 PG (ref 27–31)
MCHC RBC AUTO-ENTMCNC: 33.5 G/DL (ref 32–36)
MCV RBC AUTO: 77.6 FL (ref 80–99)
MONOCYTES # BLD AUTO: 0.48 10*3/MM3 (ref 0–1)
MONOCYTES NFR BLD AUTO: 7 % (ref 0–12)
NEUTROPHILS # BLD AUTO: 4.16 10*3/MM3 (ref 1.5–8.3)
NEUTROPHILS NFR BLD AUTO: 60.6 % (ref 41–71)
PLATELET # BLD AUTO: 175 10*3/MM3 (ref 150–450)
PMV BLD AUTO: 10.3 FL (ref 6–12)
POTASSIUM BLD-SCNC: 3.8 MMOL/L (ref 3.5–5.5)
PROT SERPL-MCNC: 7 G/DL (ref 5.7–8.2)
RBC # BLD AUTO: 5.54 10*6/MM3 (ref 4.2–5.76)
SODIUM BLD-SCNC: 140 MMOL/L (ref 132–146)
TROPONIN I SERPL-MCNC: 0 NG/ML (ref 0–0.07)
WBC NRBC COR # BLD: 6.86 10*3/MM3 (ref 3.5–10.8)
WHOLE BLOOD HOLD SPECIMEN: NORMAL
WHOLE BLOOD HOLD SPECIMEN: NORMAL

## 2018-06-09 PROCEDURE — 70551 MRI BRAIN STEM W/O DYE: CPT

## 2018-06-09 PROCEDURE — 93005 ELECTROCARDIOGRAM TRACING: CPT | Performed by: EMERGENCY MEDICINE

## 2018-06-09 PROCEDURE — 83880 ASSAY OF NATRIURETIC PEPTIDE: CPT | Performed by: EMERGENCY MEDICINE

## 2018-06-09 PROCEDURE — 71045 X-RAY EXAM CHEST 1 VIEW: CPT

## 2018-06-09 PROCEDURE — 80053 COMPREHEN METABOLIC PANEL: CPT | Performed by: EMERGENCY MEDICINE

## 2018-06-09 PROCEDURE — 99284 EMERGENCY DEPT VISIT MOD MDM: CPT

## 2018-06-09 PROCEDURE — 85025 COMPLETE CBC W/AUTO DIFF WBC: CPT | Performed by: EMERGENCY MEDICINE

## 2018-06-09 PROCEDURE — 84484 ASSAY OF TROPONIN QUANT: CPT

## 2018-06-09 RX ORDER — MECLIZINE HYDROCHLORIDE 25 MG/1
25 TABLET ORAL ONCE
Status: COMPLETED | OUTPATIENT
Start: 2018-06-09 | End: 2018-06-09

## 2018-06-09 RX ORDER — SODIUM CHLORIDE 0.9 % (FLUSH) 0.9 %
10 SYRINGE (ML) INJECTION AS NEEDED
Status: DISCONTINUED | OUTPATIENT
Start: 2018-06-09 | End: 2018-06-09 | Stop reason: HOSPADM

## 2018-06-09 RX ORDER — MECLIZINE HYDROCHLORIDE 25 MG/1
25 TABLET ORAL EVERY 6 HOURS PRN
Qty: 20 TABLET | Refills: 0 | Status: SHIPPED | OUTPATIENT
Start: 2018-06-09 | End: 2018-06-14

## 2018-06-09 RX ADMIN — MECLIZINE 25 MG: 25 TABLET ORAL at 15:59

## 2018-06-09 NOTE — DISCHARGE INSTRUCTIONS
Patient is advised to take meclizine as needed for dizziness.     Advise follow-up with PCP for recheck in 2-3 days.

## 2018-06-18 ENCOUNTER — OFFICE VISIT (OUTPATIENT)
Dept: CARDIOLOGY | Facility: CLINIC | Age: 58
End: 2018-06-18

## 2018-06-18 VITALS
HEIGHT: 70 IN | BODY MASS INDEX: 42.66 KG/M2 | HEART RATE: 84 BPM | DIASTOLIC BLOOD PRESSURE: 58 MMHG | WEIGHT: 298 LBS | SYSTOLIC BLOOD PRESSURE: 106 MMHG

## 2018-06-18 DIAGNOSIS — I25.10 CORONARY ARTERIOSCLEROSIS IN NATIVE ARTERY: Primary | ICD-10-CM

## 2018-06-18 DIAGNOSIS — I10 ESSENTIAL HYPERTENSION: ICD-10-CM

## 2018-06-18 DIAGNOSIS — R00.2 PALPITATIONS: ICD-10-CM

## 2018-06-18 DIAGNOSIS — E78.00 HYPERCHOLESTEROLEMIA: ICD-10-CM

## 2018-06-18 PROCEDURE — 99214 OFFICE O/P EST MOD 30 MIN: CPT | Performed by: INTERNAL MEDICINE

## 2018-06-18 RX ORDER — POTASSIUM CHLORIDE 20 MEQ/1
20 TABLET, EXTENDED RELEASE ORAL DAILY
Refills: 0 | COMMUNITY
Start: 2018-04-21 | End: 2019-09-06 | Stop reason: SDUPTHER

## 2018-06-18 RX ORDER — FUROSEMIDE 40 MG/1
TABLET ORAL
Qty: 45 TABLET | Refills: 5 | Status: SHIPPED | OUTPATIENT
Start: 2018-06-18 | End: 2019-09-06

## 2018-06-18 RX ORDER — LIRAGLUTIDE 6 MG/ML
0.6 INJECTION SUBCUTANEOUS DAILY
Refills: 0 | COMMUNITY
Start: 2018-04-23 | End: 2020-05-06

## 2018-06-18 NOTE — PROGRESS NOTES
"[facility]  Office visit  Denzel Harding  1960  [phone]    VISIT DATE:  10/16/2017    PCP: Ottoniel Toledo MD  [address]  [facility] [address]    CC:  Chief Complaint   Patient presents with   • Coronary Artery Disease       PROBLEM LIST:  1. Post op pericarditis with effusion s/p left anterior thoracotomy and pericardial window 8/25/17  2. CABG x 3, LAD endarterectomy 8/15/17 Dr. Damon, Normal EF, DHF  3. HTN: Controlled, BB and Norvasc  4. CVA: Remote  5. DM  6. COPD/EDD  7. HLD    ASSESSMENT:   Diagnosis Plan   1. Coronary arteriosclerosis in native artery     2. Hypercholesterolemia     3. Palpitations     4. Essential hypertension         PLAN:  Coronary artery disease: Normal EF Stable, status post surgical revascularization.  Continue aspirin, high intensity statin therapy and afterload reduction    Postop pericarditis status post pericardial window: Resolved    Hypertension: goal less than 130/80 and now potentially with intermittent symptomatic hypotension, decreasing lisinopril to 20 mg by mouth daily.    Hyperlipidemia: Continue high intensity statin therapy, goal LDL less than 100    Congestive heart failure, chronic, diastolic: Increasing Lasix from 40 mg by mouth daily to alternating doses of 40 mg and 80 mg daily.    Subjective  Blood pressures running less than 110/70 mmHg.  Intermittent lightheadedness and dizziness with ambulation.  Rare episodes of brief chest discomfort at rest, no alleviating or exacerbating factors.  He is shortness of breath and a class II pattern.  Worse in the supine position.  Fluctuating lower extremity edema.  Not necessarily compliant with dietary restrictions, just came from a pizza buffet.  He does appear compliant with medical therapy.    PHYSICAL EXAMINATION:  Vitals:    06/18/18 1443   Weight: 135 kg (298 lb)   Height: 177.8 cm (70\")     General Appearance:    Alert, cooperative, no distress, appears stated age   Head:   "  Normocephalic, without obvious abnormality, atraumatic   Eyes:    conjunctiva/corneas clear   Nose:   Nares normal, septum midline, mucosa normal, no drainage   Throat:   Lips, teeth and gums normal   Neck:   Supple, symmetrical, trachea midline, no carotid    bruit or JVD   Lungs:     Clear to auscultation bilaterally, respirations unlabored   Chest Wall:    No tenderness or deformity    Heart:    Regular rate and rhythm, S1 and S2 normal, no murmur, rub   or gallop, normal carotid impulse bilaterally without bruit.   Abdomen:     Soft, non-tender   Extremities:   Extremities normal, atraumatic, no cyanosis or edema   Pulses:   2+ and symmetric all extremities   Skin:   Skin color, texture, turgor normal, no rashes or lesions       Diagnostic Data:  Procedures  Lab Results   Component Value Date    TRIG 110 01/29/2018    HDL 25 (L) 01/29/2018     Lab Results   Component Value Date    GLUCOSE 232 (H) 06/09/2018    BUN 13 06/09/2018    CREATININE 0.97 06/09/2018     06/09/2018    K 3.8 06/09/2018     06/09/2018    CO2 29.0 06/09/2018     Lab Results   Component Value Date    HGBA1C 9.60 (H) 01/29/2018     Lab Results   Component Value Date    WBC 6.86 06/09/2018    HGB 14.4 06/09/2018    HCT 43.0 06/09/2018     06/09/2018       Allergies  Allergies   Allergen Reactions   • Sulfa Antibiotics Anaphylaxis, Nausea And Vomiting and Delirium   • Codeine Nausea And Vomiting     Can only take with Phenergan   • Invokana [Canagliflozin]      DKA   • Morphine      Does not work. It causes pain       Current Medications    Current Outpatient Prescriptions:   •  albuterol (PROVENTIL HFA;VENTOLIN HFA) 108 (90 BASE) MCG/ACT inhaler, Inhale 2 puffs Every 4 (Four) Hours As Needed. ProAir  (90 Base) MCG/ACT Inhalation Aerosol Solution; Patient Sig: ProAir  (90 Base) MCG/ACT Inhalation Aerosol Solution ; 8; 0; 05-Oct-2015; Active, Disp: , Rfl:   •  amitriptyline (ELAVIL) 25 MG tablet, Take 3 tablets  by mouth Every Night., Disp: 90 tablet, Rfl: 11  •  amLODIPine (NORVASC) 5 MG tablet, TAKE 1 TABLET BY MOUTH ONCE DAILY, Disp: 90 tablet, Rfl: 1  •  aspirin 325 MG tablet, Take 1 tablet by mouth Daily., Disp: , Rfl:   •  atorvastatin (LIPITOR) 80 MG tablet, Take 1 tablet by mouth Every Night., Disp: 30 tablet, Rfl: 0  •  Cetirizine HCl 10 MG capsule, Take 1 capsule by mouth daily., Disp: , Rfl:   •  coenzyme Q10 100 MG capsule, Take 100 mg by mouth Daily., Disp: , Rfl:   •  furosemide (LASIX) 40 MG tablet, Take 40 mg by mouth Daily., Disp: , Rfl:   •  gabapentin (NEURONTIN) 300 MG capsule, take 1 capsule by mouth twice a day, Disp: 60 capsule, Rfl: 0  •  glucose blood test strip, Use as instructed, Disp: 100 each, Rfl: 12  •  glyBURIDE-metFORMIN (GLUCOVANCE) 5-500 MG per tablet, Take 1 tablet by mouth Daily With Breakfast., Disp: , Rfl:   •  HUMALOG KWIKPEN 100 UNIT/ML solution pen-injector, Inject 10 Units under the skin 3 (Three) Times a Day With Meals. Follows SSI From PCP (Patient taking differently: Inject 40 Units under the skin 3 (Three) Times a Day With Meals. Follows SSI From PCP), Disp: , Rfl: 0  •  ibuprofen (ADVIL,MOTRIN) 800 MG tablet, TAKE 1 TABLET BY MOUTH 2-3 TIMES DAILY WITH FOOD, Disp: , Rfl: 0  •  linaclotide (LINZESS) 290 MCG capsule capsule, Take 1 capsule by mouth Every Morning. Administer at least 30 minutes before the first meal of the day on an empty stomach;, Disp: 8 capsule, Rfl: 0  •  lisinopril (PRINIVIL,ZESTRIL) 40 MG tablet, Take 1 tablet by mouth daily., Disp: , Rfl:   •  metFORMIN (GLUCOPHAGE) 500 MG tablet, Take 500 mg by mouth Daily With Breakfast., Disp: , Rfl:   •  metoprolol tartrate (LOPRESSOR) 50 MG tablet, take 1 tablet by mouth twice a day, Disp: 180 tablet, Rfl: 0  •  montelukast (SINGULAIR) 10 MG tablet, take 1 tablet by mouth at bedtime, Disp: 30 tablet, Rfl: 0  •  omeprazole (PriLOSEC) 20 MG capsule, Take 1 capsule by mouth daily., Disp: , Rfl:   •  potassium chloride  (K-DUR,KLOR-CON) 20 MEQ CR tablet, Take 20 mEq by mouth Daily., Disp: , Rfl: 0  •  promethazine (PHENERGAN) 25 MG tablet, Take 1 tablet by mouth Every 8 (Eight) Hours As Needed for Nausea or Vomiting., Disp: 12 tablet, Rfl: 0  •  raNITIdine (ZANTAC) 150 MG tablet, Take 1 tablet by mouth 2 (Two) Times a Day., Disp: 20 tablet, Rfl: 0  •  ranolazine (RANEXA) 500 MG 12 hr tablet, Take 1 tablet by mouth 2 (Two) Times a Day., Disp: 60 tablet, Rfl: 11  •  SITagliptin (JANUVIA) 50 MG tablet, Take 50 mg by mouth Daily., Disp: , Rfl:   •  topiramate (TOPAMAX) 100 MG tablet, Take 1 tablet by mouth Daily., Disp: 30 tablet, Rfl: 11  •  TOUJEO SOLOSTAR 300 UNIT/ML solution pen-injector, Inject 60 Units as directed Daily. (Patient taking differently: Inject 100 Units as directed Daily.), Disp: , Rfl: 0  •  VICTOZA 18 MG/3ML solution pen-injector injection, Inject 1.2 mg under the skin Daily., Disp: , Rfl: 0  •  vitamin C (ASCORBIC ACID) 500 MG tablet, Take 500 mg by mouth Daily., Disp: , Rfl:   •  Vortioxetine HBr (TRINTELLIX) 10 MG tablet, Take 10 mg by mouth Daily., Disp: , Rfl:           ROS  Review of Systems   Cardiovascular: Positive for chest pain, dyspnea on exertion and leg swelling. Negative for irregular heartbeat and palpitations.   Respiratory: Positive for shortness of breath. Negative for snoring and sputum production.        SOCIAL HX  Social History     Social History   • Marital status:      Spouse name: N/A   • Number of children: 1   • Years of education: N/A     Occupational History   • [employer]      Disabled since 2002-Back Problems     Social History Main Topics   • Smoking status: Former Smoker     Packs/day: 1.00     Years: 10.00     Types: Cigarettes     Quit date: 1/1/1998   • Smokeless tobacco: Former User     Types: Snuff     Quit date: 5/24/2017   • Alcohol use Yes      Comment: 3 PER YEAR   • Drug use: No   • Sexual activity: Defer     Other Topics Concern   • Not on file      Social History Narrative    Caffeine use: 0 serving daily.    Patient lives at home with wife.            FAMILY HX  Family History   Problem Relation Age of Onset   • Hypertension Mother    • Arthritis Mother    • Alcohol abuse Father    • Heart disease Other    • Stroke Other    • Hypertension Other    • Other Other         Neurologic disorder   • Parkinsonism Other    • Stroke Other    • Heart disease Other    • Hypertension Other    • Heart disease Other    • Stroke Other    • Hypertension Other              London Jiménez III, MD, FACC

## 2018-07-25 RX ORDER — AMLODIPINE BESYLATE 5 MG/1
TABLET ORAL
Qty: 90 TABLET | Refills: 1 | Status: SHIPPED | OUTPATIENT
Start: 2018-07-25 | End: 2018-12-10

## 2018-08-19 ENCOUNTER — HOSPITAL ENCOUNTER (EMERGENCY)
Facility: HOSPITAL | Age: 58
Discharge: HOME | End: 2018-08-19
Attending: EMERGENCY MEDICINE | Admitting: EMERGENCY MEDICINE

## 2018-08-19 ENCOUNTER — APPOINTMENT (OUTPATIENT)
Dept: CT IMAGING | Facility: HOSPITAL | Age: 58
End: 2018-08-19

## 2018-08-19 VITALS
DIASTOLIC BLOOD PRESSURE: 72 MMHG | HEIGHT: 70 IN | WEIGHT: 290 LBS | OXYGEN SATURATION: 97 % | SYSTOLIC BLOOD PRESSURE: 128 MMHG | TEMPERATURE: 98.6 F | HEART RATE: 89 BPM | RESPIRATION RATE: 16 BRPM | BODY MASS INDEX: 41.52 KG/M2

## 2018-08-19 DIAGNOSIS — I88.0 MESENTERIC ADENITIS: ICD-10-CM

## 2018-08-19 DIAGNOSIS — R10.31 RIGHT LOWER QUADRANT PAIN: Primary | ICD-10-CM

## 2018-08-19 LAB
ALBUMIN SERPL-MCNC: 4.09 G/DL (ref 3.2–4.8)
ALBUMIN/GLOB SERPL: 1.6 G/DL (ref 1.5–2.5)
ALP SERPL-CCNC: 82 U/L (ref 25–100)
ALT SERPL W P-5'-P-CCNC: 59 U/L (ref 7–40)
ANION GAP SERPL CALCULATED.3IONS-SCNC: 7 MMOL/L (ref 3–11)
AST SERPL-CCNC: 42 U/L (ref 0–33)
BASOPHILS # BLD AUTO: 0.05 10*3/MM3 (ref 0–0.2)
BASOPHILS NFR BLD AUTO: 0.6 % (ref 0–1)
BILIRUB SERPL-MCNC: 0.7 MG/DL (ref 0.3–1.2)
BILIRUB UR QL STRIP: NEGATIVE
BUN BLD-MCNC: 13 MG/DL (ref 9–23)
BUN/CREAT SERPL: 12.3 (ref 7–25)
CALCIUM SPEC-SCNC: 9.2 MG/DL (ref 8.7–10.4)
CHLORIDE SERPL-SCNC: 101 MMOL/L (ref 99–109)
CLARITY UR: CLEAR
CO2 SERPL-SCNC: 28 MMOL/L (ref 20–31)
COLOR UR: YELLOW
CREAT BLD-MCNC: 1.06 MG/DL (ref 0.6–1.3)
DEPRECATED RDW RBC AUTO: 39.3 FL (ref 37–54)
EOSINOPHIL # BLD AUTO: 0.24 10*3/MM3 (ref 0–0.3)
EOSINOPHIL NFR BLD AUTO: 2.9 % (ref 0–3)
ERYTHROCYTE [DISTWIDTH] IN BLOOD BY AUTOMATED COUNT: 14 % (ref 11.3–14.5)
GFR SERPL CREATININE-BSD FRML MDRD: 72 ML/MIN/1.73
GLOBULIN UR ELPH-MCNC: 2.5 GM/DL
GLUCOSE BLD-MCNC: 362 MG/DL (ref 70–100)
GLUCOSE UR STRIP-MCNC: ABNORMAL MG/DL
HCT VFR BLD AUTO: 40.9 % (ref 38.9–50.9)
HGB BLD-MCNC: 13.6 G/DL (ref 13.1–17.5)
HGB UR QL STRIP.AUTO: NEGATIVE
HOLD SPECIMEN: NORMAL
HOLD SPECIMEN: NORMAL
IMM GRANULOCYTES # BLD: 0.07 10*3/MM3 (ref 0–0.03)
IMM GRANULOCYTES NFR BLD: 0.8 % (ref 0–0.6)
KETONES UR QL STRIP: NEGATIVE
LEUKOCYTE ESTERASE UR QL STRIP.AUTO: NEGATIVE
LIPASE SERPL-CCNC: 26 U/L (ref 6–51)
LYMPHOCYTES # BLD AUTO: 1.95 10*3/MM3 (ref 0.6–4.8)
LYMPHOCYTES NFR BLD AUTO: 23.2 % (ref 24–44)
MCH RBC QN AUTO: 26.3 PG (ref 27–31)
MCHC RBC AUTO-ENTMCNC: 33.3 G/DL (ref 32–36)
MCV RBC AUTO: 79 FL (ref 80–99)
MONOCYTES # BLD AUTO: 0.29 10*3/MM3 (ref 0–1)
MONOCYTES NFR BLD AUTO: 3.4 % (ref 0–12)
NEUTROPHILS # BLD AUTO: 5.88 10*3/MM3 (ref 1.5–8.3)
NEUTROPHILS NFR BLD AUTO: 69.9 % (ref 41–71)
NITRITE UR QL STRIP: NEGATIVE
PH UR STRIP.AUTO: 5.5 [PH] (ref 5–8)
PLATELET # BLD AUTO: 183 10*3/MM3 (ref 150–450)
PMV BLD AUTO: 10.6 FL (ref 6–12)
POTASSIUM BLD-SCNC: 4.2 MMOL/L (ref 3.5–5.5)
PROT SERPL-MCNC: 6.6 G/DL (ref 5.7–8.2)
PROT UR QL STRIP: NEGATIVE
RBC # BLD AUTO: 5.18 10*6/MM3 (ref 4.2–5.76)
SODIUM BLD-SCNC: 136 MMOL/L (ref 132–146)
SP GR UR STRIP: 1.02 (ref 1–1.03)
UROBILINOGEN UR QL STRIP: ABNORMAL
WBC NRBC COR # BLD: 8.41 10*3/MM3 (ref 3.5–10.8)
WHOLE BLOOD HOLD SPECIMEN: NORMAL
WHOLE BLOOD HOLD SPECIMEN: NORMAL

## 2018-08-19 PROCEDURE — 80053 COMPREHEN METABOLIC PANEL: CPT | Performed by: EMERGENCY MEDICINE

## 2018-08-19 PROCEDURE — 96375 TX/PRO/DX INJ NEW DRUG ADDON: CPT

## 2018-08-19 PROCEDURE — 85025 COMPLETE CBC W/AUTO DIFF WBC: CPT | Performed by: EMERGENCY MEDICINE

## 2018-08-19 PROCEDURE — 25010000002 ONDANSETRON PER 1 MG: Performed by: PHYSICIAN ASSISTANT

## 2018-08-19 PROCEDURE — 81003 URINALYSIS AUTO W/O SCOPE: CPT | Performed by: EMERGENCY MEDICINE

## 2018-08-19 PROCEDURE — 96374 THER/PROPH/DIAG INJ IV PUSH: CPT

## 2018-08-19 PROCEDURE — 74176 CT ABD & PELVIS W/O CONTRAST: CPT

## 2018-08-19 PROCEDURE — 25010000002 HYDROMORPHONE PER 4 MG: Performed by: EMERGENCY MEDICINE

## 2018-08-19 PROCEDURE — 83690 ASSAY OF LIPASE: CPT | Performed by: EMERGENCY MEDICINE

## 2018-08-19 PROCEDURE — 99283 EMERGENCY DEPT VISIT LOW MDM: CPT

## 2018-08-19 RX ORDER — HYDROCODONE BITARTRATE AND ACETAMINOPHEN 5; 325 MG/1; MG/1
1 TABLET ORAL EVERY 6 HOURS PRN
Qty: 12 TABLET | Refills: 0 | Status: SHIPPED | OUTPATIENT
Start: 2018-08-19 | End: 2020-03-10

## 2018-08-19 RX ORDER — PROMETHAZINE HYDROCHLORIDE 25 MG/1
25 TABLET ORAL EVERY 8 HOURS PRN
Qty: 12 TABLET | Refills: 0 | Status: ON HOLD | OUTPATIENT
Start: 2018-08-19 | End: 2019-05-22

## 2018-08-19 RX ORDER — HYDROMORPHONE HYDROCHLORIDE 1 MG/ML
0.5 INJECTION, SOLUTION INTRAMUSCULAR; INTRAVENOUS; SUBCUTANEOUS ONCE
Status: COMPLETED | OUTPATIENT
Start: 2018-08-19 | End: 2018-08-19

## 2018-08-19 RX ORDER — ONDANSETRON 2 MG/ML
4 INJECTION INTRAMUSCULAR; INTRAVENOUS ONCE
Status: COMPLETED | OUTPATIENT
Start: 2018-08-19 | End: 2018-08-19

## 2018-08-19 RX ORDER — SODIUM CHLORIDE 0.9 % (FLUSH) 0.9 %
10 SYRINGE (ML) INJECTION AS NEEDED
Status: DISCONTINUED | OUTPATIENT
Start: 2018-08-19 | End: 2018-08-19 | Stop reason: HOSPADM

## 2018-08-19 RX ADMIN — SODIUM CHLORIDE 1000 ML: 9 INJECTION, SOLUTION INTRAVENOUS at 15:02

## 2018-08-19 RX ADMIN — HYDROMORPHONE HYDROCHLORIDE 0.5 MG: 1 INJECTION, SOLUTION INTRAMUSCULAR; INTRAVENOUS; SUBCUTANEOUS at 19:01

## 2018-08-19 RX ADMIN — ONDANSETRON 4 MG: 2 INJECTION INTRAMUSCULAR; INTRAVENOUS at 15:03

## 2018-08-19 NOTE — ED PROVIDER NOTES
Subjective   Patient comes to the emergency Department today complaining of right lower quadrant abdominal pain.  Patient reports that this pain began earlier this morning.  It was a pulled muscle.  Patient reports throughout the day this is progressed and is painful now with movement.  Patient had no fevers nausea vomiting diarrhea melena or hematochezia.  Patient is specifically concerned about having appendicitis.  States that the pain is made worse with movement file still he has some pain but not near to the degree with movement.        History provided by:  Patient, spouse and relative   used: No    Abdominal Pain   Pain location:  RLQ  Pain quality: aching and dull    Pain radiates to:  Does not radiate  Pain severity:  Moderate  Onset quality:  Gradual  Duration:  6 hours  Timing:  Constant  Progression:  Waxing and waning  Chronicity:  New  Context: not alcohol use, not diet changes, not eating, not laxative use, not previous surgeries, not recent illness, not recent sexual activity, not recent travel, not sick contacts, not suspicious food intake and not trauma    Associated symptoms: nausea    Associated symptoms: no chest pain, no chills, no diarrhea, no dysuria, no fever, no shortness of breath and no vomiting        Review of Systems   Constitutional: Negative for chills and fever.   Respiratory: Negative for chest tightness, shortness of breath and wheezing.    Cardiovascular: Negative for chest pain and palpitations.   Gastrointestinal: Positive for abdominal pain and nausea. Negative for diarrhea and vomiting.   Genitourinary: Negative for dysuria, frequency and urgency.   Musculoskeletal: Negative for back pain and neck pain.   Skin: Negative for pallor and rash.   Neurological: Negative for dizziness and weakness.   Hematological: Negative for adenopathy.   Psychiatric/Behavioral: Negative.    All other systems reviewed and are negative.      Past Medical History:   Diagnosis  Date   • Anxiety    • Arthritis    • Asthma    • BiPAP (biphasic positive airway pressure) dependence    • Brachial neuritis 1/19/2017   • Chest pain    • CHF (congestive heart failure) (CMS/[facility])    • COPD (chronic obstructive pulmonary disease) (CMS/[facility])    • Coronary artery disease    • Depression    • Diabetes mellitus (CMS/[facility])    • Dizziness    • Edema    • GERD (gastroesophageal reflux disease)    • H/O chest x-ray 07/04/2015    Mild Left base atelectasis   • H/O echocardiogram 07/05/2015    Normal LVSF. EF of 60-65%. Grade 1 diastolic dysfunction of the LV myocardium. No evidence of pericardial effusion   • H/O exercise stress test    • History of herniated intervertebral disc     History of left L5-S1 disc herniation   • Hypertension    • LOM (loss of memory) 1/19/2017   • Lower back pain     Right   • Measles    • Obstructive sleep apnea    • Palpitations    • Pericardial effusion    • Seizure disorder (CMS/[facility])    • Seizures (CMS/[facility])     FOCAL    • Shortness of breath 1/19/2017   • SOB (shortness of breath)    • Stroke (CMS/[facility])    • Wears glasses        Allergies   Allergen Reactions   • Sulfa Antibiotics Anaphylaxis, Nausea And Vomiting and Delirium   • Codeine Nausea And Vomiting     Can only take with Phenergan   • Invokana [Canagliflozin]      DKA   • Morphine      Does not work. It causes pain       Past Surgical History:   Procedure Laterality Date   • BACK SURGERY     • CARDIAC CATHETERIZATION     • CARDIAC CATHETERIZATION N/A 8/11/2017    Procedure: Coronary angiography;  Surgeon: Dallas Kim MD;  Location: [facility] CATH INVASIVE LOCATION;  Service:    • CARDIAC CATHETERIZATION N/A 8/11/2017    Procedure: Left Heart Cath;  Surgeon: Dallas Kim MD;  Location: [facility] CATH INVASIVE LOCATION;  Service:    • CARDIAC CATHETERIZATION N/A 8/11/2017    Procedure: Left ventriculography;  Surgeon: Dallas Kim MD;  Location: [facility] CATH INVASIVE LOCATION;  Service:    • CARDIOVASCULAR STRESS  TEST  07/03/2017    WITH DR HERNANDEZ AT [facility]   • CARPAL TUNNEL RELEASE Right    • CATARACT EXTRACTION W/ INTRAOCULAR LENS IMPLANT Right 6/12/2017    Procedure: CATARACT PHACO EXTRACTION WITH INTRAOCULAR LENS IMPLANT RIGHT ;  Surgeon: Natalie Cao MD;  Location: [facility] OR;  Service:    • CATARACT EXTRACTION W/ INTRAOCULAR LENS IMPLANT Left 7/10/2017    Procedure: CATARACT PHACO EXTRACTION WITH INTRAOCULAR LENS IMPLANT LEFT;  Surgeon: Natalie Cao MD;  Location: [facility] OR;  Service:    • CHOLECYSTECTOMY     • CHOLECYSTECTOMY     • COLONOSCOPY     • CORONARY ANGIOPLASTY WITH STENT PLACEMENT     • CORONARY ANGIOPLASTY WITH STENT PLACEMENT      X1-LAD   • CORONARY ARTERY BYPASS GRAFT N/A 8/15/2017    Procedure: CORONARY ARTERY BYPASS GRAFTING x 3 UTILIZING THE LEFT INTERNAL MAMMARY ARTERY WITH ENDOSCOPIC VEIN HARVESTING OF THE RIGHT GREATER SAPHENOUS VEIN, HAMLET, LAD ENDARECTOMY;  Surgeon: London Damon MD;  Location:  [facility] OR;  Service:    • ENDOSCOPY     • EYE SURGERY      cataract surgery both eyes   • KNEE ARTHROSCOPY Bilateral    • NEUROPLASTY / TRANSPOSITION ULNAR NERVE AT ELBOW     • OTHER SURGICAL HISTORY      Foraminotomy and discectomy   • PERICARDIAL WINDOW N/A 8/25/2017    Procedure: PERICARDIAL WINDOW;  Surgeon: Freeman Phillips MD;  Location:  [facility] OR;  Service:        Family History   Problem Relation Age of Onset   • Hypertension Mother    • Arthritis Mother    • Alcohol abuse Father    • Heart disease Other    • Stroke Other    • Hypertension Other    • Other Other         Neurologic disorder   • Parkinsonism Other    • Stroke Other    • Heart disease Other    • Hypertension Other    • Heart disease Other    • Stroke Other    • Hypertension Other        Social History     Social History   • Marital status:    • Number of children: 1     Occupational History   • [employer]      Disabled since 2002-Back Problems     Social History Main Topics   • Smoking status: Former Smoker      Packs/day: 1.00     Years: 10.00     Types: Cigarettes     Quit date: 1/1/1998   • Smokeless tobacco: Former User     Types: Snuff     Quit date: 5/24/2017   • Alcohol use Yes      Comment: 3 PER YEAR   • Drug use: No   • Sexual activity: Defer     Other Topics Concern   • Not on file     Social History Narrative    Caffeine use: 0 serving daily.    Patient lives at home with wife.                Objective   Physical Exam   Constitutional: He is oriented to person, place, and time. He appears well-developed and well-nourished.   HENT:   Head: Normocephalic and atraumatic.   Nose: Nose normal.   Eyes: Pupils are equal, round, and reactive to light. Conjunctivae and EOM are normal. No scleral icterus.   Neck: Normal range of motion. No thyromegaly present.   Cardiovascular: Normal rate, regular rhythm and normal heart sounds.    Pulmonary/Chest: Effort normal and breath sounds normal. No respiratory distress. He has no wheezes. He has no rales. He exhibits no tenderness.   Abdominal: Soft. Bowel sounds are normal. He exhibits no distension. There is tenderness (TTP RLQ no guarding rebound or rigidity. ).   Musculoskeletal: Normal range of motion.   Lymphadenopathy:     He has no cervical adenopathy.   Neurological: He is alert and oriented to person, place, and time. He has normal reflexes. He displays normal reflexes. No cranial nerve deficit. Coordination normal.   Skin: Skin is warm and dry.   Psychiatric: He has a normal mood and affect. His behavior is normal. Judgment and thought content normal.   Nursing note and vitals reviewed.      Procedures           ED Course      Recent Results (from the past 24 hour(s))   Comprehensive Metabolic Panel    Collection Time: 08/19/18  3:12 PM   Result Value Ref Range    Glucose 362 (H) 70 - 100 mg/dL    BUN 13 9 - 23 mg/dL    Creatinine 1.06 0.60 - 1.30 mg/dL    Sodium 136 132 - 146 mmol/L    Potassium 4.2 3.5 - 5.5 mmol/L    Chloride 101 99 - 109 mmol/L    CO2 28.0 20.0 -  31.0 mmol/L    Calcium 9.2 8.7 - 10.4 mg/dL    Total Protein 6.6 5.7 - 8.2 g/dL    Albumin 4.09 3.20 - 4.80 g/dL    ALT (SGPT) 59 (H) 7 - 40 U/L    AST (SGOT) 42 (H) 0 - 33 U/L    Alkaline Phosphatase 82 25 - 100 U/L    Total Bilirubin 0.7 0.3 - 1.2 mg/dL    eGFR Non African Amer 72 >60 mL/min/1.73    Globulin 2.5 gm/dL    A/G Ratio 1.6 1.5 - 2.5 g/dL    BUN/Creatinine Ratio 12.3 7.0 - 25.0    Anion Gap 7.0 3.0 - 11.0 mmol/L   Lipase    Collection Time: 08/19/18  3:12 PM   Result Value Ref Range    Lipase 26 6 - 51 U/L   Light Blue Top    Collection Time: 08/19/18  3:12 PM   Result Value Ref Range    Extra Tube hold for add-on    Green Top (Gel)    Collection Time: 08/19/18  3:12 PM   Result Value Ref Range    Extra Tube Hold for add-ons.    Lavender Top    Collection Time: 08/19/18  3:12 PM   Result Value Ref Range    Extra Tube hold for add-on    Gold Top - SST    Collection Time: 08/19/18  3:12 PM   Result Value Ref Range    Extra Tube Hold for add-ons.    CBC Auto Differential    Collection Time: 08/19/18  3:12 PM   Result Value Ref Range    WBC 8.41 3.50 - 10.80 10*3/mm3    RBC 5.18 4.20 - 5.76 10*6/mm3    Hemoglobin 13.6 13.1 - 17.5 g/dL    Hematocrit 40.9 38.9 - 50.9 %    MCV 79.0 (L) 80.0 - 99.0 fL    MCH 26.3 (L) 27.0 - 31.0 pg    MCHC 33.3 32.0 - 36.0 g/dL    RDW 14.0 11.3 - 14.5 %    RDW-SD 39.3 37.0 - 54.0 fl    MPV 10.6 6.0 - 12.0 fL    Platelets 183 150 - 450 10*3/mm3    Neutrophil % 69.9 41.0 - 71.0 %    Lymphocyte % 23.2 (L) 24.0 - 44.0 %    Monocyte % 3.4 0.0 - 12.0 %    Eosinophil % 2.9 0.0 - 3.0 %    Basophil % 0.6 0.0 - 1.0 %    Immature Grans % 0.8 (H) 0.0 - 0.6 %    Neutrophils, Absolute 5.88 1.50 - 8.30 10*3/mm3    Lymphocytes, Absolute 1.95 0.60 - 4.80 10*3/mm3    Monocytes, Absolute 0.29 0.00 - 1.00 10*3/mm3    Eosinophils, Absolute 0.24 0.00 - 0.30 10*3/mm3    Basophils, Absolute 0.05 0.00 - 0.20 10*3/mm3    Immature Grans, Absolute 0.07 (H) 0.00 - 0.03 10*3/mm3   Urinalysis With  Microscopic If Indicated (No Culture) - Urine, Clean Catch    Collection Time: 08/19/18  5:26 PM   Result Value Ref Range    Color, UA Yellow Yellow, Straw    Appearance, UA Clear Clear    pH, UA 5.5 5.0 - 8.0    Specific Gravity, UA 1.024 1.001 - 1.030    Glucose, UA >=1000 mg/dL (3+) (A) Negative    Ketones, UA Negative Negative    Bilirubin, UA Negative Negative    Blood, UA Negative Negative    Protein, UA Negative Negative    Leuk Esterase, UA Negative Negative    Nitrite, UA Negative Negative    Urobilinogen, UA 1.0 E.U./dL 0.2 - 1.0 E.U./dL     Note: In addition to lab results from this visit, the labs listed above may include labs taken at another facility or during a different encounter within the last 24 hours. Please correlate lab times with ED admission and discharge times for further clarification of the services performed during this visit.    CT Abdomen Pelvis Without Contrast   Final Result     1. No appendicitis.     2. No urolithiasis or obstructive uropathy.     3. No definite explanation for acute right lower quadrant pain incident 5.     4. Unchanged pattern of mild upper central mesenteric edema around the root    of small bowel mesentery, with corresponding mild reactive/inflammatory lymph    node pattern. This is somewhat nonspecific, but could relate to chronic or    acute recurrent mild mesenteric panniculitis. Correlate with amylase and lipase    to exclude pancreatitis etiology. (Otherwise, there are no imaging features of    pancreatitis.)     5. The spleen size is borderline enlarged, probably normal for height. This    appears unchanged.     6. Small chronic appearing right pleural effusion with a very thin rind; the    effusion has decreased in size. Overlying indeterminate masslike density. This    could be round atelectasis, small consolidation/pneumonia, or malignancy. This    is not seen on the 10/28/2017 CT.      7. Colonic diverticulosis. No definite evidence for diverticulitis.  "    8. Ankylosing spondylitis of the thoracic spine.        Additional nonacute findings, as above.         THIS DOCUMENT HAS BEEN ELECTRONICALLY SIGNED BY ANALILIA DIAZ MD        Vitals:    08/19/18 1427 08/19/18 1530 08/19/18 1532   BP: 156/89 131/74    BP Location: Left arm     Patient Position: Sitting     Pulse: 96     Resp: 20     Temp: 98.9 °F (37.2 °C)     TempSrc: Oral     SpO2: 96%  95%   Weight: 132 kg (290 lb)     Height: 177.8 cm (70\")       Medications   sodium chloride 0.9 % flush 10 mL (not administered)   HYDROmorphone (DILAUDID) injection 0.5 mg (not administered)   sodium chloride 0.9 % bolus 1,000 mL (0 mL Intravenous Stopped 8/19/18 1632)   ondansetron (ZOFRAN) injection 4 mg (4 mg Intravenous Given 8/19/18 1503)     ECG/EMG Results (last 24 hours)     ** No results found for the last 24 hours. **                    MDM  Number of Diagnoses or Management Options  Mesenteric adenitis: new and requires workup  Right lower quadrant pain: new and requires workup     Amount and/or Complexity of Data Reviewed  Clinical lab tests: reviewed and ordered  Tests in the radiology section of CPT®: reviewed and ordered  Tests in the medicine section of CPT®: reviewed and ordered  Discuss the patient with other providers: yes    Patient Progress  Patient progress: stable        Final diagnoses:   Right lower quadrant pain   Mesenteric adenitis            London Alaniz PA  08/19/18 7006    "

## 2018-08-20 RX ORDER — RANOLAZINE 500 MG/1
TABLET, FILM COATED, EXTENDED RELEASE ORAL
Qty: 60 TABLET | Refills: 11 | OUTPATIENT
Start: 2018-08-20

## 2018-08-20 RX ORDER — METOPROLOL TARTRATE 50 MG/1
TABLET, FILM COATED ORAL
Qty: 180 TABLET | Refills: 0 | Status: SHIPPED | OUTPATIENT
Start: 2018-08-20 | End: 2018-11-17 | Stop reason: SDUPTHER

## 2018-08-21 ENCOUNTER — HOSPITAL ENCOUNTER (EMERGENCY)
Facility: HOSPITAL | Age: 58
Discharge: HOME | End: 2018-08-21
Attending: EMERGENCY MEDICINE | Admitting: EMERGENCY MEDICINE

## 2018-08-21 ENCOUNTER — APPOINTMENT (OUTPATIENT)
Dept: GENERAL RADIOLOGY | Facility: HOSPITAL | Age: 58
End: 2018-08-21

## 2018-08-21 VITALS
DIASTOLIC BLOOD PRESSURE: 79 MMHG | WEIGHT: 295 LBS | BODY MASS INDEX: 42.23 KG/M2 | HEIGHT: 70 IN | TEMPERATURE: 97.9 F | SYSTOLIC BLOOD PRESSURE: 140 MMHG | HEART RATE: 73 BPM | OXYGEN SATURATION: 96 % | RESPIRATION RATE: 16 BRPM

## 2018-08-21 DIAGNOSIS — R11.0 NAUSEA: ICD-10-CM

## 2018-08-21 DIAGNOSIS — R10.31 RIGHT LOWER QUADRANT ABDOMINAL PAIN: Primary | ICD-10-CM

## 2018-08-21 LAB
ALBUMIN SERPL-MCNC: 3.9 G/DL (ref 3.5–5)
ALBUMIN/GLOB SERPL: 1.6 G/DL (ref 1–2)
ALP SERPL-CCNC: 79 U/L (ref 38–126)
ALT SERPL W P-5'-P-CCNC: 62 U/L (ref 13–69)
ANION GAP SERPL CALCULATED.3IONS-SCNC: 12.4 MMOL/L (ref 10–20)
AST SERPL-CCNC: 43 U/L (ref 15–46)
BASOPHILS # BLD AUTO: 0.04 10*3/MM3 (ref 0–0.2)
BASOPHILS NFR BLD AUTO: 0.6 % (ref 0–2.5)
BILIRUB SERPL-MCNC: 0.7 MG/DL (ref 0.2–1.3)
BUN BLD-MCNC: 13 MG/DL (ref 7–20)
BUN/CREAT SERPL: 18.6 (ref 6.3–21.9)
CALCIUM SPEC-SCNC: 9.3 MG/DL (ref 8.4–10.2)
CHLORIDE SERPL-SCNC: 105 MMOL/L (ref 98–107)
CO2 SERPL-SCNC: 26 MMOL/L (ref 26–30)
CREAT BLD-MCNC: 0.7 MG/DL (ref 0.6–1.3)
D-LACTATE SERPL-SCNC: 1.4 MMOL/L (ref 0.5–2)
DEPRECATED RDW RBC AUTO: 39.6 FL (ref 37–54)
EOSINOPHIL # BLD AUTO: 0.18 10*3/MM3 (ref 0–0.7)
EOSINOPHIL NFR BLD AUTO: 2.7 % (ref 0–7)
ERYTHROCYTE [DISTWIDTH] IN BLOOD BY AUTOMATED COUNT: 13.4 % (ref 11.5–14.5)
GFR SERPL CREATININE-BSD FRML MDRD: 116 ML/MIN/1.73
GLOBULIN UR ELPH-MCNC: 2.5 GM/DL
GLUCOSE BLD-MCNC: 268 MG/DL (ref 74–98)
HCT VFR BLD AUTO: 41.2 % (ref 42–52)
HGB BLD-MCNC: 13.6 G/DL (ref 14–18)
HOLD SPECIMEN: NORMAL
HOLD SPECIMEN: NORMAL
IMM GRANULOCYTES # BLD: 0.06 10*3/MM3 (ref 0–0.06)
IMM GRANULOCYTES NFR BLD: 0.9 % (ref 0–0.6)
LIPASE SERPL-CCNC: 71 U/L (ref 23–300)
LYMPHOCYTES # BLD AUTO: 1.52 10*3/MM3 (ref 0.6–3.4)
LYMPHOCYTES NFR BLD AUTO: 22.9 % (ref 10–50)
MCH RBC QN AUTO: 26.5 PG (ref 27–31)
MCHC RBC AUTO-ENTMCNC: 33 G/DL (ref 30–37)
MCV RBC AUTO: 80.3 FL (ref 80–94)
MONOCYTES # BLD AUTO: 0.31 10*3/MM3 (ref 0–0.9)
MONOCYTES NFR BLD AUTO: 4.7 % (ref 0–12)
NEUTROPHILS # BLD AUTO: 4.52 10*3/MM3 (ref 2–6.9)
NEUTROPHILS NFR BLD AUTO: 68.2 % (ref 37–80)
NRBC BLD MANUAL-RTO: 0 /100 WBC (ref 0–0)
PLATELET # BLD AUTO: 162 10*3/MM3 (ref 130–400)
PMV BLD AUTO: 10 FL (ref 6–12)
POTASSIUM BLD-SCNC: 4.4 MMOL/L (ref 3.5–5.1)
PROT SERPL-MCNC: 6.4 G/DL (ref 6.3–8.2)
RBC # BLD AUTO: 5.13 10*6/MM3 (ref 4.7–6.1)
SODIUM BLD-SCNC: 139 MMOL/L (ref 137–145)
WBC NRBC COR # BLD: 6.63 10*3/MM3 (ref 4.8–10.8)
WHOLE BLOOD HOLD SPECIMEN: NORMAL
WHOLE BLOOD HOLD SPECIMEN: NORMAL

## 2018-08-21 PROCEDURE — 96361 HYDRATE IV INFUSION ADD-ON: CPT

## 2018-08-21 PROCEDURE — 99283 EMERGENCY DEPT VISIT LOW MDM: CPT

## 2018-08-21 PROCEDURE — 96374 THER/PROPH/DIAG INJ IV PUSH: CPT

## 2018-08-21 PROCEDURE — 83690 ASSAY OF LIPASE: CPT | Performed by: PHYSICIAN ASSISTANT

## 2018-08-21 PROCEDURE — 25010000002 DIPHENHYDRAMINE PER 50 MG: Performed by: PHYSICIAN ASSISTANT

## 2018-08-21 PROCEDURE — 80053 COMPREHEN METABOLIC PANEL: CPT | Performed by: PHYSICIAN ASSISTANT

## 2018-08-21 PROCEDURE — 25010000002 KETOROLAC TROMETHAMINE PER 15 MG: Performed by: PHYSICIAN ASSISTANT

## 2018-08-21 PROCEDURE — 83605 ASSAY OF LACTIC ACID: CPT | Performed by: PHYSICIAN ASSISTANT

## 2018-08-21 PROCEDURE — 74019 RADEX ABDOMEN 2 VIEWS: CPT

## 2018-08-21 PROCEDURE — 96375 TX/PRO/DX INJ NEW DRUG ADDON: CPT

## 2018-08-21 PROCEDURE — 85025 COMPLETE CBC W/AUTO DIFF WBC: CPT | Performed by: PHYSICIAN ASSISTANT

## 2018-08-21 PROCEDURE — 96376 TX/PRO/DX INJ SAME DRUG ADON: CPT

## 2018-08-21 PROCEDURE — 25010000002 PROCHLORPERAZINE EDISYLATE PER 10 MG: Performed by: PHYSICIAN ASSISTANT

## 2018-08-21 RX ORDER — SODIUM CHLORIDE 0.9 % (FLUSH) 0.9 %
10 SYRINGE (ML) INJECTION AS NEEDED
Status: DISCONTINUED | OUTPATIENT
Start: 2018-08-21 | End: 2018-08-21 | Stop reason: HOSPADM

## 2018-08-21 RX ORDER — KETOROLAC TROMETHAMINE 30 MG/ML
15 INJECTION, SOLUTION INTRAMUSCULAR; INTRAVENOUS ONCE
Status: COMPLETED | OUTPATIENT
Start: 2018-08-21 | End: 2018-08-21

## 2018-08-21 RX ORDER — DIPHENHYDRAMINE HYDROCHLORIDE 50 MG/ML
25 INJECTION INTRAMUSCULAR; INTRAVENOUS ONCE
Status: COMPLETED | OUTPATIENT
Start: 2018-08-21 | End: 2018-08-21

## 2018-08-21 RX ADMIN — KETOROLAC TROMETHAMINE 15 MG: 30 INJECTION, SOLUTION INTRAMUSCULAR at 11:52

## 2018-08-21 RX ADMIN — SODIUM CHLORIDE 1000 ML: 9 INJECTION, SOLUTION INTRAVENOUS at 11:49

## 2018-08-21 RX ADMIN — DIPHENHYDRAMINE HYDROCHLORIDE 25 MG: 50 INJECTION, SOLUTION INTRAMUSCULAR; INTRAVENOUS at 11:50

## 2018-08-21 RX ADMIN — PROCHLORPERAZINE EDISYLATE 10 MG: 5 INJECTION INTRAMUSCULAR; INTRAVENOUS at 11:53

## 2018-08-21 RX ADMIN — KETOROLAC TROMETHAMINE 15 MG: 30 INJECTION, SOLUTION INTRAMUSCULAR at 13:57

## 2018-08-21 NOTE — ED PROVIDER NOTES
"Subjective   58-year-old male seen in the ED today, [facility], complaining of right lower quadrant pain which began about 4 days ago.  The pain has never gone away.  He said initially it was an aching soreness that has been burning for the past 2 or 3 days.  The pain is nonradiating.  He said he is having significant nausea but no vomiting.  Also loss of appetite over the past few days.  He was seen in [facility] 2 days ago at [facility] emergency department and CT did show some chronic findings which were reviewed but no acute/surgical finding to explain his right lower quadrant pain.  Specifically no appendicitis and no ureteral stone.  The patient said he saw his primary care provider yesterday, Monday, and said no medicines were prescribed.  He denies any urinary symptoms.  No back pain.  No rash.  He says his bowel pattern is \"very erratic, and this is chronic.  He said he alternates between \"explosive diarrhea\" and constipation.  He said he saw a GI doctor once previously but denies ever having an EGD or colonoscopy.  He said this pain seems to worsen with direct palpation and movement.  It is not eased by anything specific.  Currently rates his pain is moderate to severe.  His wife said he's been belching quite a bit over the past few days and often times it smells like feces.  The patient goes on to tell me that he also thinks he is in DKA currently.  He is a diabetic.  No vomiting or diarrhea.  Says his sugar today was 252.  He has had numerous CT scans-MRIs of either the abdomen and pelvis or brain over the past year.  He has a remote history of CVA.  As noted above, most recent CT scan of the abdomen and pelvis was 2 days ago.  Past medical history is also pertinent for hypertension, coronary artery disease status post CABG, prior pericarditis status post pericardial window-about one year ago, diabetes mellitus, hyperlipidemia, obesity and history of past " smoking.            Review of Systems   All other systems reviewed and are negative.      Past Medical History:   Diagnosis Date   • Anxiety    • Arthritis    • Asthma    • BiPAP (biphasic positive airway pressure) dependence    • Brachial neuritis 1/19/2017   • Chest pain    • CHF (congestive heart failure) (CMS/[facility])    • COPD (chronic obstructive pulmonary disease) (CMS/[facility])    • Coronary artery disease    • Depression    • Diabetes mellitus (CMS/[facility])    • Dizziness    • Edema    • GERD (gastroesophageal reflux disease)    • H/O chest x-ray 07/04/2015    Mild Left base atelectasis   • H/O echocardiogram 07/05/2015    Normal LVSF. EF of 60-65%. Grade 1 diastolic dysfunction of the LV myocardium. No evidence of pericardial effusion   • H/O exercise stress test    • History of herniated intervertebral disc     History of left L5-S1 disc herniation   • Hypertension    • LOM (loss of memory) 1/19/2017   • Lower back pain     Right   • Measles    • Obstructive sleep apnea    • Palpitations    • Pericardial effusion    • Seizure disorder (CMS/[facility])    • Seizures (CMS/[facility])     FOCAL    • Shortness of breath 1/19/2017   • SOB (shortness of breath)    • Stroke (CMS/[facility])    • Wears glasses        Allergies   Allergen Reactions   • Sulfa Antibiotics Anaphylaxis, Nausea And Vomiting and Delirium   • Codeine Nausea And Vomiting     Can only take with Phenergan   • Invokana [Canagliflozin]      DKA   • Morphine      Does not work. It causes pain       Past Surgical History:   Procedure Laterality Date   • BACK SURGERY     • CARDIAC CATHETERIZATION     • CARDIAC CATHETERIZATION N/A 8/11/2017    Procedure: Coronary angiography;  Surgeon: Dallas Kim MD;  Location: [facility] CATH INVASIVE LOCATION;  Service:    • CARDIAC CATHETERIZATION N/A 8/11/2017    Procedure: Left Heart Cath;  Surgeon: Dallas Kim MD;  Location: [facility] CATH INVASIVE LOCATION;  Service:    • CARDIAC CATHETERIZATION N/A 8/11/2017    Procedure: Left  ventriculography;  Surgeon: Dallas Hernandez MD;  Location: [facility] CATH INVASIVE LOCATION;  Service:    • CARDIOVASCULAR STRESS TEST  07/03/2017    WITH DR HERNANDEZ AT [facility]   • CARPAL TUNNEL RELEASE Right    • CATARACT EXTRACTION W/ INTRAOCULAR LENS IMPLANT Right 6/12/2017    Procedure: CATARACT PHACO EXTRACTION WITH INTRAOCULAR LENS IMPLANT RIGHT ;  Surgeon: Natalie Cao MD;  Location: [facility] OR;  Service:    • CATARACT EXTRACTION W/ INTRAOCULAR LENS IMPLANT Left 7/10/2017    Procedure: CATARACT PHACO EXTRACTION WITH INTRAOCULAR LENS IMPLANT LEFT;  Surgeon: Natalie Cao MD;  Location: [facility] OR;  Service:    • CHOLECYSTECTOMY     • CHOLECYSTECTOMY     • COLONOSCOPY     • CORONARY ANGIOPLASTY WITH STENT PLACEMENT     • CORONARY ANGIOPLASTY WITH STENT PLACEMENT      X1-LAD   • CORONARY ARTERY BYPASS GRAFT N/A 8/15/2017    Procedure: CORONARY ARTERY BYPASS GRAFTING x 3 UTILIZING THE LEFT INTERNAL MAMMARY ARTERY WITH ENDOSCOPIC VEIN HARVESTING OF THE RIGHT GREATER SAPHENOUS VEIN, HAMLET, LAD ENDARECTOMY;  Surgeon: London Damon MD;  Location:  [facility] OR;  Service:    • ENDOSCOPY     • EYE SURGERY      cataract surgery both eyes   • KNEE ARTHROSCOPY Bilateral    • NEUROPLASTY / TRANSPOSITION ULNAR NERVE AT ELBOW     • OTHER SURGICAL HISTORY      Foraminotomy and discectomy   • PERICARDIAL WINDOW N/A 8/25/2017    Procedure: PERICARDIAL WINDOW;  Surgeon: Freeman Phillips MD;  Location:  [facility] OR;  Service:        Family History   Problem Relation Age of Onset   • Hypertension Mother    • Arthritis Mother    • Alcohol abuse Father    • Heart disease Other    • Stroke Other    • Hypertension Other    • Other Other         Neurologic disorder   • Parkinsonism Other    • Stroke Other    • Heart disease Other    • Hypertension Other    • Heart disease Other    • Stroke Other    • Hypertension Other        Social History     Social History   • Marital status:    • Number of children: 1     Occupational History   • [employer]      Disabled since 2002-Back Problems     Social History Main Topics   • Smoking status: Former Smoker     Packs/day: 1.00     Years: 10.00     Types: Cigarettes     Quit date: 1/1/1998   • Smokeless tobacco: Former User     Types: Snuff     Quit date: 5/24/2017   • Alcohol use Yes      Comment: 3 PER YEAR   • Drug use: No   • Sexual activity: Defer     Other Topics Concern   • Not on file     Social History Narrative    Caffeine use: 0 serving daily.    Patient lives at home with wife.                Objective   Physical Exam   Constitutional: He is oriented to person, place, and time. No distress.   Patient is overweight, he does not appear acutely ill or to be in any significant distress.  Skin tone is normal, nondiaphoretic.  He answers all questions appropriately and is very cooperative.  He voices pain that is moderate to severe   HENT:   Head: Normocephalic.   Nose: Nose normal.   Eyes: Conjunctivae and EOM are normal. Right eye exhibits no discharge. Left eye exhibits no discharge. No scleral icterus.   Neck: Normal range of motion.   Cardiovascular: Normal rate, regular rhythm, normal heart sounds and intact distal pulses.    No murmur heard.  Pulmonary/Chest: Effort normal and breath sounds normal. No respiratory distress. He has no wheezes. He has no rales.   Abdominal: Soft. Bowel sounds are normal. There is tenderness. There is no rebound and no guarding. No hernia.   Sounds are present throughout, abdomen is protuberant but nonrigid.  He has tenderness of the right lower quadrant.  No CVA tenderness.  No masses guarding or rebound.   Musculoskeletal: Normal range of motion. He exhibits no edema.   Neurological: He is alert and oriented to person, place, and time. No cranial nerve deficit or sensory deficit. He exhibits normal muscle tone. Coordination normal.   Skin: Skin is warm. Capillary refill takes less than 2 seconds. No rash noted. He is not diaphoretic. No pallor.    Psychiatric: He has a normal mood and affect. His behavior is normal. Thought content normal.   Vitals reviewed.      Procedures           ED Course  ED Course as of Aug 21 1408   Tue Aug 21, 2018   1335 Reexamined patient at 1320.  Reviewed all lab findings as well as the x-ray results.  His wife is in the room with him.  He said he is feeling better and has less pain although he requests  something additional.  I don't feel he needs repeat CT scanning given he had this 2 days ago and also based on his clinical exam, vital signs and current lab tests.  He does have moderate constipation on the plain film.  I have recommended MiraLAX, to continue using his promethazine that he already has at home.  Also, follow up with his PCP within 48 hours.  Return here in the interim if his symptoms change or worsen significantly.  [BW]      ED Course User Index  [BW] Rigoberto Uribe PA-C                  [facility]      Final diagnoses:   Right lower quadrant abdominal pain   Nausea            Rigoberto Uribe PA-C  08/21/18 7116

## 2018-08-21 NOTE — DISCHARGE INSTRUCTIONS
Please continue to take all of your current medicines as directed.  Try to follow-up again with your family physician within 48 hours.  Return to the ER anytime if your symptoms change or worsen significantly.  Try MiraLAX over-the-counter as directed.

## 2018-08-30 RX ORDER — RANOLAZINE 500 MG/1
TABLET, FILM COATED, EXTENDED RELEASE ORAL
Qty: 60 TABLET | Refills: 11 | OUTPATIENT
Start: 2018-08-30

## 2018-08-30 RX ORDER — RANOLAZINE 500 MG/1
TABLET, FILM COATED, EXTENDED RELEASE ORAL
Qty: 60 TABLET | Refills: 11 | Status: SHIPPED | OUTPATIENT
Start: 2018-08-30 | End: 2019-07-18 | Stop reason: SDUPTHER

## 2018-11-15 ENCOUNTER — OFFICE VISIT (OUTPATIENT)
Dept: PULMONOLOGY | Facility: CLINIC | Age: 58
End: 2018-11-15

## 2018-11-15 VITALS
SYSTOLIC BLOOD PRESSURE: 138 MMHG | OXYGEN SATURATION: 95 % | HEIGHT: 70 IN | WEIGHT: 285 LBS | BODY MASS INDEX: 40.8 KG/M2 | RESPIRATION RATE: 18 BRPM | DIASTOLIC BLOOD PRESSURE: 84 MMHG | HEART RATE: 81 BPM

## 2018-11-15 DIAGNOSIS — G47.33 OBSTRUCTIVE SLEEP APNEA: Primary | ICD-10-CM

## 2018-11-15 DIAGNOSIS — E66.01 MORBID OBESITY, UNSPECIFIED OBESITY TYPE (HCC): ICD-10-CM

## 2018-11-15 DIAGNOSIS — J45.40 MODERATE PERSISTENT ASTHMA WITHOUT COMPLICATION: ICD-10-CM

## 2018-11-15 PROCEDURE — 95012 NITRIC OXIDE EXP GAS DETER: CPT | Performed by: INTERNAL MEDICINE

## 2018-11-15 PROCEDURE — 99214 OFFICE O/P EST MOD 30 MIN: CPT | Performed by: INTERNAL MEDICINE

## 2018-11-15 RX ORDER — SITAGLIPTIN 100 MG/1
100 TABLET, FILM COATED ORAL DAILY
Refills: 0 | COMMUNITY
Start: 2018-11-09 | End: 2018-12-10

## 2018-11-15 RX ORDER — GABAPENTIN 600 MG/1
600 TABLET ORAL 3 TIMES DAILY
Refills: 0 | COMMUNITY
Start: 2018-09-04 | End: 2020-12-09 | Stop reason: SDUPTHER

## 2018-11-15 RX ORDER — FLUNISOLIDE 0.25 MG/ML
1 SOLUTION NASAL 2 TIMES DAILY
Refills: 0 | COMMUNITY
Start: 2018-10-17 | End: 2019-02-19

## 2018-11-15 RX ORDER — VORTIOXETINE 20 MG/1
20 TABLET, FILM COATED ORAL DAILY
Refills: 0 | COMMUNITY
Start: 2018-10-23 | End: 2018-12-10

## 2018-11-15 RX ORDER — LEVOCETIRIZINE DIHYDROCHLORIDE 5 MG/1
5 TABLET, FILM COATED ORAL EVERY EVENING
Refills: 0 | COMMUNITY
Start: 2018-11-05 | End: 2020-05-14 | Stop reason: SDUPTHER

## 2018-11-15 RX ORDER — BROMOCRIPTINE MESYLATE 0.8 MG/1
3.2 TABLET ORAL EVERY MORNING
Refills: 0 | COMMUNITY
Start: 2018-09-27 | End: 2020-12-09 | Stop reason: SDUPTHER

## 2018-11-15 NOTE — PROGRESS NOTES
"Chief Complaint   Patient presents with   • Follow-up   • Sleeping Problem         Subjective   Denzel Harding is a 58 y.o. male.     History of Present Illness   Patient comes today for follow up of shortness of breath, sleep apnea and asthma. Patient says that the symptoms have been worsening since the last clinic visit.  He actually has not come back for follow-up in more than 2 years.    He is using his rescue inhaler at least 1 night per week or more.  He also needs pro-air 3-4 times a week or more.    Patient does not report any emergency room visits or recent exacerbations.     Patient does report significant issues with his Pap device and mask.  He says that his BiPAP occasionally malfunctions but even if the device continues to function appropriately, he has started to notice no significant improvement in daytime sleepiness or tiredness.      Patient says that the compliance with the use of the equipment is good.       The following portions of the patient's history were reviewed and updated as appropriate: allergies, current medications, past family history, past medical history, past social history and past surgical history.    Review of Systems   Constitutional: Negative for chills, fatigue and fever.   HENT: Negative for sinus pressure, sneezing and sore throat.    Respiratory: Negative for cough and shortness of breath.    Psychiatric/Behavioral: Negative for sleep disturbance.       Objective   Visit Vitals  /84   Pulse 81   Resp 18   Ht 177.8 cm (70\")   Wt 129 kg (285 lb)   SpO2 95%   BMI 40.89 kg/m²       Physical Exam   Constitutional: He is oriented to person, place, and time. He appears well-developed and well-nourished.   HENT:   Head: Atraumatic.   Crowded oropharynx.   Missing teeth noted.    Neck:   Increased adipose tissue.    Cardiovascular: Normal rate and regular rhythm.   Pulmonary/Chest: Effort normal and breath sounds normal. No stridor. No respiratory distress. He has no " wheezes.   Musculoskeletal:   Gait was normal.   Neurological: He is alert and oriented to person, place, and time.   Psychiatric: He has a normal mood and affect.   Vitals reviewed.        Assessment/Plan   Denzel was seen today for follow-up and sleeping problem.    Diagnoses and all orders for this visit:    Obstructive sleep apnea  -     Polysomnography 4 or More Parameters; Future    Moderate persistent asthma without complication  -     Pulmonary Function Test; Future  -     Nitric Oxide    Morbid obesity, unspecified obesity type (CMS/HCC)    Other orders  -     Tiotropium Bromide Monohydrate (SPIRIVA RESPIMAT) 1.25 MCG/ACT aerosol solution inhaler; Inhale 2 puffs Daily.  -     Mometasone Furoate 100 MCG/ACT aerosol; Inhale 2 puffs 2 (Two) Times a Day. Rinse mouth with water after use.           Return in about 3 months (around 2/15/2019) for Recheck, Sleep study, Overbook, PFT on day of F/Up.    DISCUSSION (if any):  Patient seems to have symptoms of poorly controlled sleep apnea    Since the patient's previous device is more than 7-8 years ago when his last sleep study was more than 10 years ago, and the only realistic option would be to undergo a split night study.    It appears that the patient is in BiPAP and if the split-night is performed, BiPAP titration will be attempted.    As far as asthma is concerned, it is definitely poorly controlled.    Have started the patient on Spiriva and Asmanex.    PFTs will be obtained upon follow-up visit.    FeNO level was 31 today.    Patient was advised to call this office with any issues          Dictated utilizing Dragon dictation.    This document was electronically signed by Linda Barajas MD November 15, 2018  3:16 PM

## 2018-11-19 RX ORDER — METOPROLOL TARTRATE 50 MG/1
TABLET, FILM COATED ORAL
Qty: 180 TABLET | Refills: 0 | Status: SHIPPED | OUTPATIENT
Start: 2018-11-19 | End: 2019-04-15 | Stop reason: SDUPTHER

## 2018-11-23 ENCOUNTER — APPOINTMENT (OUTPATIENT)
Dept: GENERAL RADIOLOGY | Facility: HOSPITAL | Age: 58
End: 2018-11-23

## 2018-11-23 ENCOUNTER — APPOINTMENT (OUTPATIENT)
Dept: CT IMAGING | Facility: HOSPITAL | Age: 58
End: 2018-11-23

## 2018-11-23 ENCOUNTER — HOSPITAL ENCOUNTER (EMERGENCY)
Facility: HOSPITAL | Age: 58
Discharge: HOME | End: 2018-11-23
Attending: EMERGENCY MEDICINE | Admitting: EMERGENCY MEDICINE

## 2018-11-23 VITALS
TEMPERATURE: 98.4 F | OXYGEN SATURATION: 97 % | SYSTOLIC BLOOD PRESSURE: 153 MMHG | HEART RATE: 87 BPM | HEIGHT: 70 IN | DIASTOLIC BLOOD PRESSURE: 84 MMHG | RESPIRATION RATE: 16 BRPM | BODY MASS INDEX: 42.1 KG/M2 | WEIGHT: 294.1 LBS

## 2018-11-23 DIAGNOSIS — R07.9 CHEST PAIN, UNSPECIFIED TYPE: Primary | ICD-10-CM

## 2018-11-23 DIAGNOSIS — R19.7 DIARRHEA, UNSPECIFIED TYPE: ICD-10-CM

## 2018-11-23 DIAGNOSIS — L03.316 CELLULITIS, UMBILICAL: ICD-10-CM

## 2018-11-23 LAB
ALBUMIN SERPL-MCNC: 4.8 G/DL (ref 3.5–5)
ALBUMIN/GLOB SERPL: 1.5 G/DL (ref 1–2)
ALP SERPL-CCNC: 104 U/L (ref 38–126)
ALT SERPL W P-5'-P-CCNC: 69 U/L (ref 13–69)
ANION GAP SERPL CALCULATED.3IONS-SCNC: 17.2 MMOL/L (ref 10–20)
AST SERPL-CCNC: 52 U/L (ref 15–46)
BASOPHILS # BLD AUTO: 0.05 10*3/MM3 (ref 0–0.2)
BASOPHILS NFR BLD AUTO: 0.6 % (ref 0–2.5)
BILIRUB SERPL-MCNC: 1.2 MG/DL (ref 0.2–1.3)
BUN BLD-MCNC: 18 MG/DL (ref 7–20)
BUN/CREAT SERPL: 30 (ref 6.3–21.9)
CALCIUM SPEC-SCNC: 9.6 MG/DL (ref 8.4–10.2)
CHLORIDE SERPL-SCNC: 98 MMOL/L (ref 98–107)
CO2 SERPL-SCNC: 22 MMOL/L (ref 26–30)
CREAT BLD-MCNC: 0.6 MG/DL (ref 0.6–1.3)
DEPRECATED RDW RBC AUTO: 38.3 FL (ref 37–54)
EOSINOPHIL # BLD AUTO: 0.2 10*3/MM3 (ref 0–0.7)
EOSINOPHIL NFR BLD AUTO: 2.5 % (ref 0–7)
ERYTHROCYTE [DISTWIDTH] IN BLOOD BY AUTOMATED COUNT: 13.4 % (ref 11.5–14.5)
GFR SERPL CREATININE-BSD FRML MDRD: 139 ML/MIN/1.73
GLOBULIN UR ELPH-MCNC: 3.1 GM/DL
GLUCOSE BLD-MCNC: 393 MG/DL (ref 74–98)
HCT VFR BLD AUTO: 43.8 % (ref 42–52)
HGB BLD-MCNC: 14.6 G/DL (ref 14–18)
HOLD SPECIMEN: NORMAL
HOLD SPECIMEN: NORMAL
IMM GRANULOCYTES # BLD: 0.08 10*3/MM3 (ref 0–0.06)
IMM GRANULOCYTES NFR BLD: 1 % (ref 0–0.6)
LYMPHOCYTES # BLD AUTO: 1.95 10*3/MM3 (ref 0.6–3.4)
LYMPHOCYTES NFR BLD AUTO: 24.8 % (ref 10–50)
MCH RBC QN AUTO: 26.3 PG (ref 27–31)
MCHC RBC AUTO-ENTMCNC: 33.3 G/DL (ref 30–37)
MCV RBC AUTO: 78.8 FL (ref 80–94)
MONOCYTES # BLD AUTO: 0.43 10*3/MM3 (ref 0–0.9)
MONOCYTES NFR BLD AUTO: 5.5 % (ref 0–12)
NEUTROPHILS # BLD AUTO: 5.16 10*3/MM3 (ref 2–6.9)
NEUTROPHILS NFR BLD AUTO: 65.6 % (ref 37–80)
NRBC BLD MANUAL-RTO: 0 /100 WBC (ref 0–0)
PLATELET # BLD AUTO: 197 10*3/MM3 (ref 130–400)
PMV BLD AUTO: 9.8 FL (ref 6–12)
POTASSIUM BLD-SCNC: 4.2 MMOL/L (ref 3.5–5.1)
PROT SERPL-MCNC: 7.9 G/DL (ref 6.3–8.2)
RBC # BLD AUTO: 5.56 10*6/MM3 (ref 4.7–6.1)
SODIUM BLD-SCNC: 133 MMOL/L (ref 137–145)
TROPONIN I SERPL-MCNC: 0.01 NG/ML (ref 0–0.05)
TROPONIN I SERPL-MCNC: <0.012 NG/ML (ref 0–0.03)
TROPONIN I SERPL-MCNC: <0.012 NG/ML (ref 0–0.03)
WBC NRBC COR # BLD: 7.87 10*3/MM3 (ref 4.8–10.8)
WHOLE BLOOD HOLD SPECIMEN: NORMAL
WHOLE BLOOD HOLD SPECIMEN: NORMAL

## 2018-11-23 PROCEDURE — 71045 X-RAY EXAM CHEST 1 VIEW: CPT

## 2018-11-23 PROCEDURE — 99285 EMERGENCY DEPT VISIT HI MDM: CPT

## 2018-11-23 PROCEDURE — 93005 ELECTROCARDIOGRAM TRACING: CPT | Performed by: EMERGENCY MEDICINE

## 2018-11-23 PROCEDURE — 63710000001 INSULIN REGULAR HUMAN PER 5 UNITS: Performed by: NURSE PRACTITIONER

## 2018-11-23 PROCEDURE — 85025 COMPLETE CBC W/AUTO DIFF WBC: CPT | Performed by: EMERGENCY MEDICINE

## 2018-11-23 PROCEDURE — 74177 CT ABD & PELVIS W/CONTRAST: CPT

## 2018-11-23 PROCEDURE — 25010000002 KETOROLAC TROMETHAMINE PER 15 MG: Performed by: NURSE PRACTITIONER

## 2018-11-23 PROCEDURE — 96375 TX/PRO/DX INJ NEW DRUG ADDON: CPT

## 2018-11-23 PROCEDURE — 80053 COMPREHEN METABOLIC PANEL: CPT | Performed by: EMERGENCY MEDICINE

## 2018-11-23 PROCEDURE — 96374 THER/PROPH/DIAG INJ IV PUSH: CPT

## 2018-11-23 PROCEDURE — 84484 ASSAY OF TROPONIN QUANT: CPT | Performed by: EMERGENCY MEDICINE

## 2018-11-23 PROCEDURE — 25010000002 IOPAMIDOL 61 % SOLUTION: Performed by: EMERGENCY MEDICINE

## 2018-11-23 PROCEDURE — 25010000002 ONDANSETRON PER 1 MG: Performed by: NURSE PRACTITIONER

## 2018-11-23 PROCEDURE — 84484 ASSAY OF TROPONIN QUANT: CPT | Performed by: NURSE PRACTITIONER

## 2018-11-23 RX ORDER — CEPHALEXIN 500 MG/1
500 CAPSULE ORAL 2 TIMES DAILY
Qty: 14 CAPSULE | Refills: 0 | Status: SHIPPED | OUTPATIENT
Start: 2018-11-23 | End: 2018-11-23

## 2018-11-23 RX ORDER — SODIUM CHLORIDE 0.9 % (FLUSH) 0.9 %
10 SYRINGE (ML) INJECTION AS NEEDED
Status: DISCONTINUED | OUTPATIENT
Start: 2018-11-23 | End: 2018-11-23 | Stop reason: HOSPADM

## 2018-11-23 RX ORDER — ONDANSETRON 4 MG/1
4 TABLET, ORALLY DISINTEGRATING ORAL EVERY 6 HOURS PRN
Qty: 20 TABLET | Refills: 0 | Status: SHIPPED | OUTPATIENT
Start: 2018-11-23 | End: 2020-05-14

## 2018-11-23 RX ORDER — NITROGLYCERIN 0.4 MG/1
0.4 TABLET SUBLINGUAL
Status: COMPLETED | OUTPATIENT
Start: 2018-11-23 | End: 2018-11-23

## 2018-11-23 RX ORDER — ASPIRIN 325 MG
325 TABLET ORAL ONCE
Status: COMPLETED | OUTPATIENT
Start: 2018-11-23 | End: 2018-11-23

## 2018-11-23 RX ORDER — ONDANSETRON 2 MG/ML
4 INJECTION INTRAMUSCULAR; INTRAVENOUS ONCE
Status: COMPLETED | OUTPATIENT
Start: 2018-11-23 | End: 2018-11-23

## 2018-11-23 RX ORDER — KETOROLAC TROMETHAMINE 30 MG/ML
30 INJECTION, SOLUTION INTRAMUSCULAR; INTRAVENOUS ONCE
Status: COMPLETED | OUTPATIENT
Start: 2018-11-23 | End: 2018-11-23

## 2018-11-23 RX ADMIN — ONDANSETRON 4 MG: 2 INJECTION INTRAMUSCULAR; INTRAVENOUS at 17:44

## 2018-11-23 RX ADMIN — SODIUM CHLORIDE 1000 ML: 9 INJECTION, SOLUTION INTRAVENOUS at 17:44

## 2018-11-23 RX ADMIN — IOPAMIDOL 100 ML: 612 INJECTION, SOLUTION INTRAVENOUS at 18:42

## 2018-11-23 RX ADMIN — ASPIRIN 325 MG ORAL TABLET 325 MG: 325 PILL ORAL at 17:44

## 2018-11-23 RX ADMIN — NITROGLYCERIN 0.4 MG: 0.4 TABLET SUBLINGUAL at 19:00

## 2018-11-23 RX ADMIN — NITROGLYCERIN 0.4 MG: 0.4 TABLET SUBLINGUAL at 18:54

## 2018-11-23 RX ADMIN — NITROGLYCERIN 0.4 MG: 0.4 TABLET SUBLINGUAL at 18:11

## 2018-11-23 RX ADMIN — HUMAN INSULIN 8 UNITS: 100 INJECTION, SOLUTION SUBCUTANEOUS at 18:11

## 2018-11-23 RX ADMIN — KETOROLAC TROMETHAMINE 30 MG: 30 INJECTION, SOLUTION INTRAMUSCULAR at 18:11

## 2018-11-23 NOTE — ED PROVIDER NOTES
Subjective   History of Present Illness  This a 58-year-old gentleman who comes in today complaining of an episode of chest pain.  He reports that he was feeling bad when he got up this morning and went on to the local diner to get him a sausage egg and cheese sandwich.  He states while he was eating a salmon she continued to feel bad and then went on home.  After he got home he had an episode of loose stool.  Then after about an hour he started having severe cramping and had diarrhea stool where he did not make it to the bathroom.  After that time he went to clean himself up he started feeling a pinching sensation in the middle of his chest that lasted for about an hour.  He states that the symptoms progressed throughout the day and has not improved.  He denies any shortness of breath but states he does have pain when he takes a deep breath.  He does report nausea without vomiting.  He denies any fever or chills.  Review of Systems   Constitutional: Negative.    HENT: Negative.    Eyes: Negative.    Respiratory: Positive for chest tightness.    Cardiovascular: Positive for chest pain.   Gastrointestinal: Positive for abdominal pain, diarrhea and nausea.   Endocrine: Negative.    Musculoskeletal: Negative.    Skin: Negative.    Allergic/Immunologic: Negative.    Neurological: Negative.    Hematological: Negative.    Psychiatric/Behavioral: Negative.    All other systems reviewed and are negative.      Past Medical History:   Diagnosis Date   • Anxiety    • Arthritis    • Asthma    • BiPAP (biphasic positive airway pressure) dependence    • Brachial neuritis 1/19/2017   • Chest pain    • CHF (congestive heart failure) (CMS/[facility])    • COPD (chronic obstructive pulmonary disease) (CMS/[facility])    • Coronary artery disease    • Depression    • Diabetes mellitus (CMS/[facility])    • Dizziness    • Edema    • GERD (gastroesophageal reflux disease)    • H/O chest x-ray 07/04/2015    Mild Left base atelectasis   • H/O echocardiogram  07/05/2015    Normal LVSF. EF of 60-65%. Grade 1 diastolic dysfunction of the LV myocardium. No evidence of pericardial effusion   • H/O exercise stress test    • History of herniated intervertebral disc     History of left L5-S1 disc herniation   • Hypertension    • LOM (loss of memory) 1/19/2017   • Lower back pain     Right   • Measles    • Obstructive sleep apnea    • Palpitations    • Pericardial effusion    • Seizure disorder (CMS/HCC)    • Seizures (CMS/HCC)     FOCAL    • Shortness of breath 1/19/2017   • SOB (shortness of breath)    • Stroke (CMS/HCC)    • Wears glasses        Allergies   Allergen Reactions   • Sulfa Antibiotics Anaphylaxis, Nausea And Vomiting and Delirium   • Codeine Nausea And Vomiting     Can only take with Phenergan   • Invokana [Canagliflozin]      DKA   • Morphine      Does not work. It causes pain       Past Surgical History:   Procedure Laterality Date   • BACK SURGERY     • CARDIAC CATHETERIZATION     • CARDIOVASCULAR STRESS TEST  07/03/2017    WITH DR HERNANDEZ AT [facility]   • CARPAL TUNNEL RELEASE Right    • CHOLECYSTECTOMY     • CHOLECYSTECTOMY     • COLONOSCOPY     • CORONARY ANGIOPLASTY WITH STENT PLACEMENT     • CORONARY ANGIOPLASTY WITH STENT PLACEMENT      X1-LAD   • ENDOSCOPY     • EYE SURGERY      cataract surgery both eyes   • KNEE ARTHROSCOPY Bilateral    • NEUROPLASTY / TRANSPOSITION ULNAR NERVE AT [facility]     • OTHER SURGICAL HISTORY      Foraminotomy and discectomy       Family History   Problem Relation Age of Onset   • Hypertension Mother    • Arthritis Mother    • Alcohol abuse Father    • Heart disease Other    • Stroke Other    • Hypertension Other    • Other Other         Neurologic disorder   • Parkinsonism Other    • Stroke Other    • Heart disease Other    • Hypertension Other    • Heart disease Other    • Stroke Other    • Hypertension Other        Social History     Socioeconomic History   • Marital status:      Spouse name: Not on file   • Number of  children: 1   • Years of education: Not on file   • Highest education level: Not on file   Occupational History   • Occupation: [employer]     Comment: Disabled since -Back Problems   Tobacco Use   • Smoking status: Former Smoker     Packs/day: 1.00     Years: 10.00     Pack years: 10.00     Types: Cigarettes     Last attempt to quit: 1998     Years since quittin.9   • Smokeless tobacco: Former User     Types: Snuff     Quit date: 2017   Substance and Sexual Activity   • Alcohol use: Yes     Comment: 3 PER YEAR   • Drug use: No   • Sexual activity: Defer   Social History Narrative    Caffeine use: 0 serving daily.    Patient lives at home with wife.            Objective   Physical Exam   Constitutional: He appears well-developed and well-nourished.   Nursing note and vitals reviewed.  GEN: No acute distress  Head: Normocephalic, atraumatic  Eyes: Pupils equal round reactive to light  ENT: Posterior pharynx normal in appearance, oral mucosa is moist  Chest: Nontender to palpation  Cardiovascular: Regular rate  Lungs: Clear to auscultation bilaterally  Abdomen: Soft, tender LLQ , nondistended, no peritoneal signs  Extremities: No edema, normal appearance  Neuro: GCS 15  Psych: Mood and affect are appropriate      Procedures           ED Course  ED Course as of    1806 Complain of worse chest pain.   [TW]   1823 EKG interpreted by me reveals sinus rhythm with rate of 101 bpm.  There is some nonspecific ST-T wave changes.  Atypical appearing EKG.  [TB]   1823 PD EKG interpreted by me reveals sinus rhythm with a rate of 90 bpm.  There are again nonspecific ST-T wave changes similar to previous EKG.  Abnormal appearing EKG.  [TB]    Consulted with Dr. Kim, discussed labs, He advised for patient to keep appointment with Dr. Jiménez on the .   [TW]    Patient does not have any redness to suggest cellulitis. I will give him a script  of keflex for him to take if he develops any redness   [TW]      ED Course User Index  [TB] Rosa Henson MD  [TW] Anjali Dubose, APRN                  MDM  Number of Diagnoses or Management Options     Amount and/or Complexity of Data Reviewed  Clinical lab tests: ordered and reviewed  Tests in the radiology section of CPT®: ordered and reviewed  Review and summarize past medical records: yes  Discuss the patient with other providers: yes  Independent visualization of images, tracings, or specimens: yes    Risk of Complications, Morbidity, and/or Mortality  Presenting problems: moderate  Diagnostic procedures: moderate  Management options: moderate          Final diagnoses:   Chest pain, unspecified type   Diarrhea, unspecified type   Cellulitis, umbilical            Anjali Dubose APRN  11/23/18 2004       Anjali Dubose APRN  11/23/18 2006

## 2018-11-25 ENCOUNTER — HOSPITAL ENCOUNTER (EMERGENCY)
Facility: HOSPITAL | Age: 58
Discharge: HOME | End: 2018-11-25
Attending: EMERGENCY MEDICINE | Admitting: EMERGENCY MEDICINE

## 2018-11-25 VITALS
SYSTOLIC BLOOD PRESSURE: 146 MMHG | TEMPERATURE: 98.6 F | RESPIRATION RATE: 18 BRPM | DIASTOLIC BLOOD PRESSURE: 81 MMHG | HEIGHT: 70 IN | WEIGHT: 290 LBS | HEART RATE: 73 BPM | BODY MASS INDEX: 41.52 KG/M2 | OXYGEN SATURATION: 94 %

## 2018-11-25 DIAGNOSIS — M54.40 ACUTE RIGHT-SIDED LOW BACK PAIN WITH SCIATICA, SCIATICA LATERALITY UNSPECIFIED: Primary | ICD-10-CM

## 2018-11-25 DIAGNOSIS — M62.830 BACK SPASM: ICD-10-CM

## 2018-11-25 DIAGNOSIS — M54.5 ACUTE LOW BACK PAIN, UNSPECIFIED BACK PAIN LATERALITY, WITH SCIATICA PRESENCE UNSPECIFIED: ICD-10-CM

## 2018-11-25 LAB
ALBUMIN SERPL-MCNC: 4.2 G/DL (ref 3.2–4.8)
ALBUMIN/GLOB SERPL: 1.8 G/DL (ref 1.5–2.5)
ALP SERPL-CCNC: 88 U/L (ref 25–100)
ALT SERPL W P-5'-P-CCNC: 68 U/L (ref 7–40)
ANION GAP SERPL CALCULATED.3IONS-SCNC: 8 MMOL/L (ref 3–11)
AST SERPL-CCNC: 41 U/L (ref 0–33)
BASOPHILS # BLD AUTO: 0.04 10*3/MM3 (ref 0–0.2)
BASOPHILS NFR BLD AUTO: 0.7 % (ref 0–1)
BILIRUB SERPL-MCNC: 1 MG/DL (ref 0.3–1.2)
BILIRUB UR QL STRIP: NEGATIVE
BUN BLD-MCNC: 9 MG/DL (ref 9–23)
BUN/CREAT SERPL: 10.5 (ref 7–25)
CALCIUM SPEC-SCNC: 9 MG/DL (ref 8.7–10.4)
CHLORIDE SERPL-SCNC: 105 MMOL/L (ref 99–109)
CLARITY UR: CLEAR
CO2 SERPL-SCNC: 22 MMOL/L (ref 20–31)
COLOR UR: YELLOW
CREAT BLD-MCNC: 0.86 MG/DL (ref 0.6–1.3)
DEPRECATED RDW RBC AUTO: 39.2 FL (ref 37–54)
EOSINOPHIL # BLD AUTO: 0.19 10*3/MM3 (ref 0–0.3)
EOSINOPHIL NFR BLD AUTO: 3.1 % (ref 0–3)
ERYTHROCYTE [DISTWIDTH] IN BLOOD BY AUTOMATED COUNT: 13.9 % (ref 11.3–14.5)
GFR SERPL CREATININE-BSD FRML MDRD: 92 ML/MIN/1.73
GLOBULIN UR ELPH-MCNC: 2.3 GM/DL
GLUCOSE BLD-MCNC: 369 MG/DL (ref 70–100)
GLUCOSE UR STRIP-MCNC: ABNORMAL MG/DL
HCT VFR BLD AUTO: 44.4 % (ref 38.9–50.9)
HGB BLD-MCNC: 14.8 G/DL (ref 13.1–17.5)
HGB UR QL STRIP.AUTO: NEGATIVE
IMM GRANULOCYTES # BLD: 0.07 10*3/MM3 (ref 0–0.03)
IMM GRANULOCYTES NFR BLD: 1.1 % (ref 0–0.6)
KETONES UR QL STRIP: ABNORMAL
LEUKOCYTE ESTERASE UR QL STRIP.AUTO: NEGATIVE
LYMPHOCYTES # BLD AUTO: 1.35 10*3/MM3 (ref 0.6–4.8)
LYMPHOCYTES NFR BLD AUTO: 22.1 % (ref 24–44)
MCH RBC QN AUTO: 26.4 PG (ref 27–31)
MCHC RBC AUTO-ENTMCNC: 33.3 G/DL (ref 32–36)
MCV RBC AUTO: 79.3 FL (ref 80–99)
MONOCYTES # BLD AUTO: 0.29 10*3/MM3 (ref 0–1)
MONOCYTES NFR BLD AUTO: 4.7 % (ref 0–12)
NEUTROPHILS # BLD AUTO: 4.25 10*3/MM3 (ref 1.5–8.3)
NEUTROPHILS NFR BLD AUTO: 69.4 % (ref 41–71)
NITRITE UR QL STRIP: NEGATIVE
PH UR STRIP.AUTO: <=5 [PH] (ref 5–8)
PLATELET # BLD AUTO: 182 10*3/MM3 (ref 150–450)
PMV BLD AUTO: 10.3 FL (ref 6–12)
POTASSIUM BLD-SCNC: 4.2 MMOL/L (ref 3.5–5.5)
PROT SERPL-MCNC: 6.5 G/DL (ref 5.7–8.2)
PROT UR QL STRIP: NEGATIVE
RBC # BLD AUTO: 5.6 10*6/MM3 (ref 4.2–5.76)
SODIUM BLD-SCNC: 135 MMOL/L (ref 132–146)
SP GR UR STRIP: 1.02 (ref 1–1.03)
UROBILINOGEN UR QL STRIP: ABNORMAL
WBC NRBC COR # BLD: 6.12 10*3/MM3 (ref 3.5–10.8)

## 2018-11-25 PROCEDURE — 25010000002 KETOROLAC TROMETHAMINE PER 15 MG: Performed by: EMERGENCY MEDICINE

## 2018-11-25 PROCEDURE — 80053 COMPREHEN METABOLIC PANEL: CPT | Performed by: EMERGENCY MEDICINE

## 2018-11-25 PROCEDURE — 99283 EMERGENCY DEPT VISIT LOW MDM: CPT

## 2018-11-25 PROCEDURE — 25010000002 ONDANSETRON PER 1 MG: Performed by: EMERGENCY MEDICINE

## 2018-11-25 PROCEDURE — 96375 TX/PRO/DX INJ NEW DRUG ADDON: CPT

## 2018-11-25 PROCEDURE — 81003 URINALYSIS AUTO W/O SCOPE: CPT | Performed by: EMERGENCY MEDICINE

## 2018-11-25 PROCEDURE — 96374 THER/PROPH/DIAG INJ IV PUSH: CPT

## 2018-11-25 PROCEDURE — 85025 COMPLETE CBC W/AUTO DIFF WBC: CPT | Performed by: EMERGENCY MEDICINE

## 2018-11-25 PROCEDURE — 25010000002 HYDROMORPHONE PER 4 MG: Performed by: EMERGENCY MEDICINE

## 2018-11-25 RX ORDER — ONDANSETRON 2 MG/ML
4 INJECTION INTRAMUSCULAR; INTRAVENOUS ONCE
Status: COMPLETED | OUTPATIENT
Start: 2018-11-25 | End: 2018-11-25

## 2018-11-25 RX ORDER — CYCLOBENZAPRINE HCL 10 MG
10 TABLET ORAL 3 TIMES DAILY PRN
Qty: 15 TABLET | Refills: 0 | Status: SHIPPED | OUTPATIENT
Start: 2018-11-25 | End: 2020-05-14

## 2018-11-25 RX ORDER — HYDROCODONE BITARTRATE AND ACETAMINOPHEN 5; 325 MG/1; MG/1
1-2 TABLET ORAL EVERY 6 HOURS PRN
Qty: 12 TABLET | Refills: 0 | Status: ON HOLD | OUTPATIENT
Start: 2018-11-25 | End: 2019-05-22

## 2018-11-25 RX ORDER — HYDROMORPHONE HYDROCHLORIDE 1 MG/ML
0.5 INJECTION, SOLUTION INTRAMUSCULAR; INTRAVENOUS; SUBCUTANEOUS ONCE
Status: COMPLETED | OUTPATIENT
Start: 2018-11-25 | End: 2018-11-25

## 2018-11-25 RX ORDER — KETOROLAC TROMETHAMINE 15 MG/ML
15 INJECTION, SOLUTION INTRAMUSCULAR; INTRAVENOUS ONCE
Status: COMPLETED | OUTPATIENT
Start: 2018-11-25 | End: 2018-11-25

## 2018-11-25 RX ORDER — SODIUM CHLORIDE 0.9 % (FLUSH) 0.9 %
10 SYRINGE (ML) INJECTION AS NEEDED
Status: DISCONTINUED | OUTPATIENT
Start: 2018-11-25 | End: 2018-11-25 | Stop reason: HOSPADM

## 2018-11-25 RX ADMIN — KETOROLAC TROMETHAMINE 15 MG: 15 INJECTION, SOLUTION INTRAMUSCULAR; INTRAVENOUS at 11:43

## 2018-11-25 RX ADMIN — ONDANSETRON 4 MG: 2 INJECTION INTRAMUSCULAR; INTRAVENOUS at 11:43

## 2018-11-25 RX ADMIN — HYDROMORPHONE HYDROCHLORIDE 0.5 MG: 1 INJECTION, SOLUTION INTRAMUSCULAR; INTRAVENOUS; SUBCUTANEOUS at 11:44

## 2018-11-25 NOTE — ED PROVIDER NOTES
Subjective   Low back pain. Radiates down left leg. No loss of bb. Worse with movement.    No cp. No soa. No dizziness.        Back Pain   Location:  Lumbar spine  Quality:  Aching  Radiates to:  R thigh  Pain severity:  Moderate  Pain is:  Same all the time  Onset quality:  Sudden  Duration:  3 days  Timing:  Constant  Progression:  Waxing and waning  Chronicity:  New  Context: twisting    Relieved by:  Nothing  Worsened by:  Movement  Ineffective treatments:  None tried  Associated symptoms: leg pain    Associated symptoms: no abdominal pain, no abdominal swelling, no bladder incontinence, no bowel incontinence, no chest pain, no dysuria, no fever and no perianal numbness        Review of Systems   Constitutional: Negative for chills, diaphoresis and fever.   HENT: Negative for congestion and sore throat.    Respiratory: Negative for cough, choking, chest tightness, shortness of breath and wheezing.    Cardiovascular: Negative for chest pain and leg swelling.   Gastrointestinal: Negative for abdominal distention, abdominal pain, anal bleeding, blood in stool, bowel incontinence, constipation, diarrhea, nausea and vomiting.   Genitourinary: Negative for bladder incontinence, difficulty urinating, dysuria, flank pain, frequency, hematuria and urgency.   Musculoskeletal: Positive for back pain.   All other systems reviewed and are negative.      Past Medical History:   Diagnosis Date   • Anxiety    • Arthritis    • Asthma    • BiPAP (biphasic positive airway pressure) dependence    • Brachial neuritis 1/19/2017   • Chest pain    • CHF (congestive heart failure) (CMS/[facility])    • COPD (chronic obstructive pulmonary disease) (CMS/[facility])    • Coronary artery disease    • Depression    • Diabetes mellitus (CMS/[facility])    • Dizziness    • Edema    • GERD (gastroesophageal reflux disease)    • H/O chest x-ray 07/04/2015    Mild Left base atelectasis   • H/O echocardiogram 07/05/2015    Normal LVSF. EF of 60-65%. Grade 1 diastolic  dysfunction of the LV myocardium. No evidence of pericardial effusion   • H/O exercise stress test    • History of herniated intervertebral disc     History of left L5-S1 disc herniation   • Hypertension    • LOM (loss of memory) 1/19/2017   • Lower back pain     Right   • Measles    • Obstructive sleep apnea    • Palpitations    • Pericardial effusion    • Seizure disorder (CMS/HCC)    • Seizures (CMS/HCC)     FOCAL    • Shortness of breath 1/19/2017   • SOB (shortness of breath)    • Stroke (CMS/HCC)    • Wears glasses        Allergies   Allergen Reactions   • Sulfa Antibiotics Anaphylaxis, Nausea And Vomiting and Delirium   • Codeine Nausea And Vomiting     Can only take with Phenergan   • Invokana [Canagliflozin]      DKA   • Morphine      Does not work. It causes pain       Past Surgical History:   Procedure Laterality Date   • BACK SURGERY     • CARDIAC CATHETERIZATION     • CARDIOVASCULAR STRESS TEST  07/03/2017    WITH DR HERNANDEZ AT [facility]   • CARPAL TUNNEL RELEASE Right    • CHOLECYSTECTOMY     • CHOLECYSTECTOMY     • COLONOSCOPY     • CORONARY ANGIOPLASTY WITH STENT PLACEMENT     • CORONARY ANGIOPLASTY WITH STENT PLACEMENT      X1-LAD   • ENDOSCOPY     • EYE SURGERY      cataract surgery both eyes   • KNEE ARTHROSCOPY Bilateral    • NEUROPLASTY / TRANSPOSITION ULNAR NERVE AT [facility]     • OTHER SURGICAL HISTORY      Foraminotomy and discectomy       Family History   Problem Relation Age of Onset   • Hypertension Mother    • Arthritis Mother    • Alcohol abuse Father    • Heart disease Other    • Stroke Other    • Hypertension Other    • Other Other         Neurologic disorder   • Parkinsonism Other    • Stroke Other    • Heart disease Other    • Hypertension Other    • Heart disease Other    • Stroke Other    • Hypertension Other        Social History     Socioeconomic History   • Marital status:      Spouse name: Not on file   • Number of children: 1   • Years of education: Not on file   • Highest  education level: Not on file   Occupational History   • Occupation: [employer]     Comment: Disabled since -Back Problems   Tobacco Use   • Smoking status: Former Smoker     Packs/day: 1.00     Years: 10.00     Pack years: 10.00     Types: Cigarettes     Last attempt to quit: 1998     Years since quittin.9   • Smokeless tobacco: Former User     Types: Snuff     Quit date: 2017   Substance and Sexual Activity   • Alcohol use: Yes     Comment: 3 PER YEAR   • Drug use: No   • Sexual activity: Defer   Social History Narrative    Caffeine use: 0 serving daily.    Patient lives at home with wife.            Objective   Physical Exam   Constitutional: He is oriented to person, place, and time. He appears well-developed and well-nourished.   HENT:   Head: Normocephalic and atraumatic.   Right Ear: External ear normal.   Left Ear: External ear normal.   Nose: Nose normal.   Mouth/Throat: Oropharynx is clear and moist.   Eyes: Conjunctivae and EOM are normal. Pupils are equal, round, and reactive to light.   Neck: Normal range of motion. Neck supple.   Cardiovascular: Normal rate, regular rhythm, normal heart sounds and intact distal pulses.   Pulmonary/Chest: Effort normal and breath sounds normal.   Abdominal: Soft. Bowel sounds are normal.   Musculoskeletal: Normal range of motion.        Lumbar back: He exhibits tenderness, pain and spasm. He exhibits normal range of motion and normal pulse.   Neurological: He is alert and oriented to person, place, and time.   Skin: Skin is warm and dry.   Psychiatric: He has a normal mood and affect. His behavior is normal. Judgment normal.       Procedures           ED Course  ED Course as of  1331   Sun 2018   1329 I reviewed old CTs. No clear benefit to repeating. Cw lumbar MS pain.    Pt thankful and agreeable with tx poc. Well aware of the ss of worsening condition.    Mandatory pcp fu this week.  [JM]      ED Course User  Index  [JM] London Simmons APRN        No orders to display               MDM      Final diagnoses:   Acute right-sided low back pain with sciatica, sciatica laterality unspecified   Back spasm   Acute low back pain, unspecified back pain laterality, with sciatica presence unspecified            London Simmons APRN  11/25/18 1305       London Simmons APRN  11/25/18 1647

## 2018-12-10 ENCOUNTER — OFFICE VISIT (OUTPATIENT)
Dept: CARDIOLOGY | Facility: CLINIC | Age: 58
End: 2018-12-10

## 2018-12-10 VITALS
BODY MASS INDEX: 41.52 KG/M2 | HEART RATE: 89 BPM | HEIGHT: 70 IN | SYSTOLIC BLOOD PRESSURE: 90 MMHG | WEIGHT: 290 LBS | DIASTOLIC BLOOD PRESSURE: 54 MMHG

## 2018-12-10 DIAGNOSIS — I10 ESSENTIAL HYPERTENSION: ICD-10-CM

## 2018-12-10 DIAGNOSIS — E78.00 HYPERCHOLESTEROLEMIA: ICD-10-CM

## 2018-12-10 DIAGNOSIS — R07.2 PRECORDIAL PAIN: ICD-10-CM

## 2018-12-10 DIAGNOSIS — I25.10 CORONARY ARTERIOSCLEROSIS IN NATIVE ARTERY: Primary | ICD-10-CM

## 2018-12-10 DIAGNOSIS — R00.2 PALPITATIONS: ICD-10-CM

## 2018-12-10 PROCEDURE — 99214 OFFICE O/P EST MOD 30 MIN: CPT | Performed by: INTERNAL MEDICINE

## 2018-12-10 RX ORDER — AMLODIPINE BESYLATE 5 MG/1
2.5 TABLET ORAL DAILY
Qty: 90 TABLET | Refills: 1 | Status: SHIPPED | OUTPATIENT
Start: 2018-12-10 | End: 2019-08-20 | Stop reason: SDUPTHER

## 2018-12-10 NOTE — PROGRESS NOTES
[facility]  Office visit  Denzel Harding  1960  [phone]    VISIT DATE:  10/16/2017    PCP: Ottoniel Toledo MD  [address]  [facility] [address]    CC:  No chief complaint on file.      PROBLEM LIST:  1. Post op pericarditis with effusion s/p left anterior thoracotomy and pericardial window 8/25/17  2. CABG x 3, LAD endarterectomy 8/15/17 Dr. Damon, Normal EF, DHF  3. HTN: Controlled, BB and Norvasc  4. CVA: Remote  5. DM  6. COPD/EDD  7. HLD    ASSESSMENT:   Diagnosis Plan   1. Coronary arteriosclerosis in native artery     2. Hypercholesterolemia     3. Palpitations     4. Essential hypertension         PLAN:  Coronary artery disease: Normal EF Stable, status post surgical revascularization.  Currently with atypical angina, myocardial perfusion imaging pending.  Continue aspirin, high intensity statin therapy and afterload reduction    Postop pericarditis status post pericardial window    Hypertension: goal less than 130/80 and now with persistent intermittent symptomatic hypotension, decreasing amlodipine to 2.5 mg by mouth daily.  Hyperlipidemia: Continue high intensity statin therapy, goal LDL less than 100    Congestive heart failure, chronic, diastolic: Continue Lasix from 40 mg by mouth daily to alternating doses of 40 mg and 80 mg daily.    Hyperlipidemia: Goal LDL less than 70.  Continue atorvastatin 40 mg by mouth daily.  Recommended a heart healthy diet.    Subjective  Blood pressures running less than 130/80 mmHg. continues to have Intermittent lightheadedness and dizziness with ambulation.  He is having intermittent episodes of moderate to severe precordial chest discomfort.  Describes as a dull, sharp pain.  Nonradiating.  Lasting less than 5 minutes.  No obvious triggers.  No alleviating or exacerbating factors.  Has not been very compliant with a heart healthy diet.  His most recent episode of chest discomfort was triggered after eating a sausage, egg and  cheese sandwich.  Reviewed most recent ED evaluation which included an unremarkable chest x-ray.  Low risk twelve-lead EKG.  He does appear compliant with medical therapy.    PHYSICAL EXAMINATION:  There were no vitals filed for this visit.  General Appearance:    Alert, cooperative, no distress, appears stated age   Head:    Normocephalic, without obvious abnormality, atraumatic   Eyes:    conjunctiva/corneas clear   Nose:   Nares normal, septum midline, mucosa normal, no drainage   Throat:   Lips, teeth and gums normal   Neck:   Supple, symmetrical, trachea midline, no carotid    bruit or JVD   Lungs:     Clear to auscultation bilaterally, respirations unlabored   Chest Wall:    No tenderness or deformity    Heart:    Regular rate and rhythm, S1 and S2 normal, no murmur, rub   or gallop, normal carotid impulse bilaterally without bruit.   Abdomen:     Soft, non-tender   Extremities:   Extremities normal, atraumatic, no cyanosis or edema   Pulses:   2+ and symmetric all extremities   Skin:   Skin color, texture, turgor normal, no rashes or lesions       Diagnostic Data:  Procedures  Lab Results   Component Value Date    TRIG 110 01/29/2018    HDL 25 (L) 01/29/2018     Lab Results   Component Value Date    GLUCOSE 369 (H) 11/25/2018    BUN 9 11/25/2018    CREATININE 0.86 11/25/2018     11/25/2018    K 4.2 11/25/2018     11/25/2018    CO2 22.0 11/25/2018     Lab Results   Component Value Date    HGBA1C 9.60 (H) 01/29/2018     Lab Results   Component Value Date    WBC 6.12 11/25/2018    HGB 14.8 11/25/2018    HCT 44.4 11/25/2018     11/25/2018       Allergies  Allergies   Allergen Reactions   • Sulfa Antibiotics Anaphylaxis, Nausea And Vomiting and Delirium   • Codeine Nausea And Vomiting     Can only take with Phenergan   • Invokana [Canagliflozin]      DKA   • Morphine      Does not work. It causes pain       Current Medications    Current Outpatient Medications:   •  albuterol (PROVENTIL  HFA;VENTOLIN HFA) 108 (90 BASE) MCG/ACT inhaler, Inhale 2 puffs Every 4 (Four) Hours As Needed. ProAir  (90 Base) MCG/ACT Inhalation Aerosol Solution; Patient Sig: ProAir  (90 Base) MCG/ACT Inhalation Aerosol Solution ; 8; 0; 05-Oct-2015; Active, Disp: , Rfl:   •  amitriptyline (ELAVIL) 25 MG tablet, Take 3 tablets by mouth Every Night., Disp: 90 tablet, Rfl: 11  •  amLODIPine (NORVASC) 5 MG tablet, take 1 tablet by mouth once daily, Disp: 90 tablet, Rfl: 1  •  aspirin 325 MG tablet, Take 1 tablet by mouth Daily., Disp: , Rfl:   •  atorvastatin (LIPITOR) 80 MG tablet, Take 1 tablet by mouth Every Night., Disp: 30 tablet, Rfl: 0  •  Cetirizine HCl 10 MG capsule, Take 1 capsule by mouth daily., Disp: , Rfl:   •  coenzyme Q10 100 MG capsule, Take 100 mg by mouth Daily., Disp: , Rfl:   •  cyclobenzaprine (FLEXERIL) 10 MG tablet, Take 1 tablet by mouth 3 (Three) Times a Day As Needed for Muscle Spasms., Disp: 15 tablet, Rfl: 0  •  CYCLOSET 0.8 MG tablet, , Disp: , Rfl: 0  •  flunisolide (NASALIDE) 25 MCG/ACT (0.025%) solution nasal spray, 1 spray 2 (Two) Times a Day., Disp: , Rfl: 0  •  furosemide (LASIX) 40 MG tablet, Take alternating doses 40mg and 80 mg po daily, Disp: 45 tablet, Rfl: 5  •  gabapentin (NEURONTIN) 300 MG capsule, Take 300 mg by mouth 2 (Two) Times a Day., Disp: , Rfl: 0  •  glucose blood test strip, Use as instructed, Disp: 100 each, Rfl: 12  •  HUMALOG KWIKPEN 100 UNIT/ML solution pen-injector, Inject 10 Units under the skin 3 (Three) Times a Day With Meals. Follows SSI From PCP (Patient taking differently: Inject 40 Units under the skin 3 (Three) Times a Day With Meals. Follows SSI From PCP), Disp: , Rfl: 0  •  HYDROcodone-acetaminophen (NORCO) 5-325 MG per tablet, Take 1 tablet by mouth Every 6 (Six) Hours As Needed for Severe Pain ., Disp: 12 tablet, Rfl: 0  •  HYDROcodone-acetaminophen (NORCO) 5-325 MG per tablet, Take 1-2 tablets by mouth Every 6 (Six) Hours As Needed for Severe  Pain ., Disp: 12 tablet, Rfl: 0  •  ibuprofen (ADVIL,MOTRIN) 800 MG tablet, TAKE 1 TABLET BY MOUTH 2-3 TIMES DAILY WITH FOOD, Disp: , Rfl: 0  •  JANUVIA 100 MG tablet, , Disp: , Rfl: 0  •  levocetirizine (XYZAL) 5 MG tablet, , Disp: , Rfl: 0  •  lisinopril (PRINIVIL,ZESTRIL) 40 MG tablet, Take 20 tablets by mouth Daily., Disp: , Rfl:   •  metoprolol tartrate (LOPRESSOR) 50 MG tablet, take 1 tablet by mouth twice a day, Disp: 180 tablet, Rfl: 0  •  Mometasone Furoate 100 MCG/ACT aerosol, Inhale 2 puffs 2 (Two) Times a Day. Rinse mouth with water after use., Disp: 1 inhaler, Rfl: 5  •  montelukast (SINGULAIR) 10 MG tablet, take 1 tablet by mouth at bedtime, Disp: 30 tablet, Rfl: 0  •  omeprazole (PriLOSEC) 20 MG capsule, Take 1 capsule by mouth daily., Disp: , Rfl:   •  ondansetron ODT (ZOFRAN-ODT) 4 MG disintegrating tablet, Take 1 tablet by mouth Every 6 (Six) Hours As Needed for Nausea., Disp: 20 tablet, Rfl: 0  •  potassium chloride (K-DUR,KLOR-CON) 20 MEQ CR tablet, Take 20 mEq by mouth Daily., Disp: , Rfl: 0  •  promethazine (PHENERGAN) 25 MG tablet, Take 1 tablet by mouth Every 8 (Eight) Hours As Needed for Nausea or Vomiting., Disp: 12 tablet, Rfl: 0  •  RANEXA 500 MG 12 hr tablet,  TAKE 1 TABLET BY MOUTH 2 TIMES A DAY, Disp: 60 tablet, Rfl: 11  •  SITagliptin (JANUVIA) 50 MG tablet, Take 50 mg by mouth Daily., Disp: , Rfl:   •  Tiotropium Bromide Monohydrate (SPIRIVA RESPIMAT) 1.25 MCG/ACT aerosol solution inhaler, Inhale 2 puffs Daily., Disp: 1 inhaler, Rfl: 5  •  topiramate (TOPAMAX) 100 MG tablet, Take 1 tablet by mouth Daily., Disp: 30 tablet, Rfl: 11  •  TOUJEO SOLOSTAR 300 UNIT/ML solution pen-injector, Inject 60 Units as directed Daily. (Patient taking differently: Inject 100 Units as directed Daily.), Disp: , Rfl: 0  •  TRINTELLIX 20 MG tablet, , Disp: , Rfl: 0  •  VICTOZA 18 MG/3ML solution pen-injector injection, Inject 1.2 mg under the skin Daily., Disp: , Rfl: 0  •  vitamin C (ASCORBIC ACID) 500  MG tablet, Take 500 mg by mouth Daily., Disp: , Rfl:   •  Vortioxetine HBr (TRINTELLIX) 10 MG tablet, Take 10 mg by mouth Daily., Disp: , Rfl:           ROS  Review of Systems   Cardiovascular: Positive for chest pain, dyspnea on exertion, leg swelling and palpitations. Negative for irregular heartbeat.   Respiratory: Positive for shortness of breath and snoring. Negative for sputum production.        SOCIAL HX  Social History     Socioeconomic History   • Marital status:      Spouse name: Not on file   • Number of children: 1   • Years of education: Not on file   • Highest education level: Not on file   Social Needs   • Financial resource strain: Not on file   • Food insecurity - worry: Not on file   • Food insecurity - inability: Not on file   • Transportation needs - medical: Not on file   • Transportation needs - non-medical: Not on file   Occupational History   • Occupation: [employer]     Comment: Disabled since -Back Problems   Tobacco Use   • Smoking status: Former Smoker     Packs/day: 1.00     Years: 10.00     Pack years: 10.00     Types: Cigarettes     Last attempt to quit: 1998     Years since quittin.9   • Smokeless tobacco: Former User     Types: Snuff     Quit date: 2017   Substance and Sexual Activity   • Alcohol use: Yes     Comment: 3 PER YEAR   • Drug use: No   • Sexual activity: Defer   Other Topics Concern   • Not on file   Social History Narrative    Caffeine use: 0 serving daily.    Patient lives at home with wife.        FAMILY HX  Family History   Problem Relation Age of Onset   • Hypertension Mother    • Arthritis Mother    • Alcohol abuse Father    • Heart disease Other    • Stroke Other    • Hypertension Other    • Other Other         Neurologic disorder   • Parkinsonism Other    • Stroke Other    • Heart disease Other    • Hypertension Other    • Heart disease Other    • Stroke Other    • Hypertension Other              London Jiménez III, MD,  FACC

## 2018-12-13 ENCOUNTER — HOSPITAL ENCOUNTER (OUTPATIENT)
Dept: SLEEP MEDICINE | Facility: HOSPITAL | Age: 58
Setting detail: THERAPIES SERIES
End: 2018-12-13
Attending: INTERNAL MEDICINE

## 2018-12-13 DIAGNOSIS — G47.33 OBSTRUCTIVE SLEEP APNEA: ICD-10-CM

## 2018-12-13 PROCEDURE — 95811 POLYSOM 6/>YRS CPAP 4/> PARM: CPT | Performed by: INTERNAL MEDICINE

## 2018-12-13 PROCEDURE — 95811 POLYSOM 6/>YRS CPAP 4/> PARM: CPT

## 2018-12-17 ENCOUNTER — HOSPITAL ENCOUNTER (OUTPATIENT)
Dept: CARDIOLOGY | Facility: HOSPITAL | Age: 58
Discharge: HOME | End: 2018-12-17
Attending: INTERNAL MEDICINE

## 2018-12-17 VITALS — WEIGHT: 290 LBS | BODY MASS INDEX: 41.52 KG/M2 | HEIGHT: 70 IN

## 2018-12-17 DIAGNOSIS — R07.2 PRECORDIAL PAIN: ICD-10-CM

## 2018-12-17 PROCEDURE — 93018 CV STRESS TEST I&R ONLY: CPT | Performed by: INTERNAL MEDICINE

## 2018-12-17 PROCEDURE — 78452 HT MUSCLE IMAGE SPECT MULT: CPT | Performed by: INTERNAL MEDICINE

## 2018-12-17 PROCEDURE — 25010000002 REGADENOSON 0.4 MG/5ML SOLUTION: Performed by: INTERNAL MEDICINE

## 2018-12-17 PROCEDURE — 93017 CV STRESS TEST TRACING ONLY: CPT

## 2018-12-17 PROCEDURE — A9500 TC99M SESTAMIBI: HCPCS | Performed by: INTERNAL MEDICINE

## 2018-12-17 PROCEDURE — 78452 HT MUSCLE IMAGE SPECT MULT: CPT

## 2018-12-17 PROCEDURE — 0 TECHNETIUM SESTAMIBI: Performed by: INTERNAL MEDICINE

## 2018-12-17 RX ADMIN — TECHNETIUM TC 99M SESTAMIBI 1 DOSE: 1 INJECTION INTRAVENOUS at 08:55

## 2018-12-17 RX ADMIN — TECHNETIUM TC 99M SESTAMIBI 1 DOSE: 1 INJECTION INTRAVENOUS at 10:34

## 2018-12-17 RX ADMIN — REGADENOSON 0.4 MG: 0.08 INJECTION, SOLUTION INTRAVENOUS at 10:35

## 2018-12-18 LAB
BH CV NUCLEAR PRIOR STUDY: 1
BH CV STRESS BP STAGE 1: NORMAL
BH CV STRESS BP STAGE 2: NORMAL
BH CV STRESS COMMENTS STAGE 1: NORMAL
BH CV STRESS COMMENTS STAGE 2: NORMAL
BH CV STRESS DOSE REGADENOSON STAGE 1: 0.4
BH CV STRESS DURATION MIN STAGE 1: 4
BH CV STRESS DURATION MIN STAGE 2: 2
BH CV STRESS DURATION SEC STAGE 1: 0
BH CV STRESS DURATION SEC STAGE 2: 0
BH CV STRESS HR STAGE 1: 107
BH CV STRESS HR STAGE 2: 106
BH CV STRESS PROTOCOL 1: NORMAL
BH CV STRESS RECOVERY BP: NORMAL MMHG
BH CV STRESS RECOVERY HR: 106 BPM
BH CV STRESS STAGE 1: 1
BH CV STRESS STAGE 2: 2
LV EF NUC BP: 50 %
MAXIMAL PREDICTED HEART RATE: 163 BPM
PERCENT MAX PREDICTED HR: 69.33 %
STRESS BASELINE BP: NORMAL MMHG
STRESS BASELINE HR: 103 BPM
STRESS PERCENT HR: 82 %
STRESS POST PEAK BP: NORMAL MMHG
STRESS POST PEAK HR: 113 BPM
STRESS TARGET HR: 139 BPM

## 2018-12-19 ENCOUNTER — TELEPHONE (OUTPATIENT)
Dept: CARDIOLOGY | Facility: CLINIC | Age: 58
End: 2018-12-19

## 2018-12-19 NOTE — TELEPHONE ENCOUNTER
----- Message from London Jiménez III, MD sent at 12/19/2018  9:12 AM EST -----  No evidence of blockage is under stress test.

## 2018-12-21 DIAGNOSIS — G47.33 OSA (OBSTRUCTIVE SLEEP APNEA): Primary | ICD-10-CM

## 2019-02-14 NOTE — OP NOTE
PROCEDURE NOTE      Date of Procedure: 7/10/2017  Patient Name:  Denzel Harding  MRN: [id number]  YOB: 1960      PREOPERATIVE DIAGNOSIS:  Visually significant combined form of senile cataract of the left eye interfering with activities of daily living.    INDICATIONS FOR THE PROCEDURE:  Visually significant combined form of senile cataract of the left eye interfering with activities of daily living.    POSTOPERATIVE DIAGNOSIS:  Visually significant combined form of senile cataract of the left eye interfering with activities of daily living.    SURGEON:  Natalie Cao M.D.    NAME OF PROCEDURE:  left cataract extraction with posterior chamber intraocular lens implant. Lens used: +16.5D SA60WF  CDE: 5.97    ANESTHESIA:  Topical with MAC.    COMPLICATIONS:  None.    DETAILS OF THE PROCEDURE:  After receiving appropriate informed consent and confirming and marking the eye to be operated upon, the patient was wheeled into the operating room. After confirming adequate anesthesia, the patient was prepped and draped in a standard sterile ophthalmic fashion. A lid speculum was then placed in the eye.  A 0.8 mm metal blade was then used to make two limbal paracenteses and the anterior chamber was reformed with Viscoat.  A 2.4 mm metal keratome was then used to make a superior clear cornea tunneled incision.  A cystotome was used to puncture the anterior capsule and initiate a capsular flap.  Utrata forceps were used to complete a continuous curvilinear capsulorrhexis.  BSS on a Daly hydrodissection cannula was then used to hydrodissect and hydrodelineate the nucleus.  The nucleus was then phacoemulsified using a stop and chop technique.  CDE was 5.97%.  Remaining cortex was removed with bimanual I/A.  Posterior capsular polish was performed.  The capsular bag was then reformed with Healon-GV and a +16.5 D SA60WF lens implant, was then placed in the bag without event.  The Healon-GV was then removed with  Palliative Medicine Office Visit Palliative Medicine Nurse Check In 
[phone] Patient Name: Dianne Huynh YOB: 1959 Date of Office Visit: 2/14/2019 Patient states: \"  \" 
 
From Check In Sheet (scanned in Media): 
Has a medical provider talked with you about cardiopulmonary resuscitation (CPR)? [x] Yes   [] No   [] Unable to obtain Nurse reminder to complete or update ACP FlowSheet: 
 
Is ACP on the Problem List?    [x] Yes    [] No 
IF ACP Document is ON FILE; Nurse to place ACP on Problem List  
 
Is there an ACP Note in Chart Review/Note? [x] Yes    [] No  
If NO: ALERT PROVIDER Primary Decision [facility] Agent): 
   Primary Decision Maker: Dilcia Mercedes - Spouse - [phone] Secondary Decision Maker: Etienne Schwartz - Father - [phone] Relationship to patient: 
Phone number: 
[x] Named in a scanned document  
[] Legal Next of Kin 
[] Guardian ACP documents you current have include: 
[x] Advance Directive or Living Will 
[] Durable Do Not Resuscitate 
[] Physician Orders for Scope of Treatment (POST) [] Medical Power of  
[] Other Is there anything that we should know about you as a person in order to provide you the best care possible? Have you been to the ER, urgent care clinic since your last visit? [] Yes   [x] No   [] Unable to obtain Have you been hospitalized since your last visit? [] Yes   [x] No   [] Unable to obtain Have you seen or consulted any other health care providers outside of the [address] [place] [address] since your last visit? [] Yes   [x] No   [] Unable to obtain Functional status (describe):  
 
 
Last BM:2/14/2019  accessed (date): 2/14/2019 Bottle review (for opioid pain medication): 
Medication 1:  
Date filled:  
Directions:  
# filled: # left: # pills taking per day: 
Last dose taken: 
 
Medication 2:  
Date filled:  
Directions:  
# filled: # left: # pills taking per day: 
Last dose taken: 
 
Medication 3:  
Date filled:  
Directions:  
# filled: # left: # pills taking per day: 
Last dose taken: 
 
Medication 4:  
Date filled:  
Directions:  
# filled: # left: # pills taking per day: 
Last dose taken: bimanual irrigation and aspiration and the anterior chamber reformed with BSS.  The wounds were hydrated as necessary to maintain a water tight anterior chamber. Intracameral Moxifloxacin 1mg/0.1ml was administered and 5% povidone iodine solution was placed on the ocular surface. At the conclusion of the procedure, the lid speculum was removed and the patient undraped.  A drop of Vigamox and Pred Forte 1% and shield were placed over the eye.  The patient left the room in good condition having tolerated the procedure well.    Natalie Cao MD

## 2019-02-19 ENCOUNTER — OFFICE VISIT (OUTPATIENT)
Dept: PULMONOLOGY | Facility: CLINIC | Age: 59
End: 2019-02-19

## 2019-02-19 VITALS
DIASTOLIC BLOOD PRESSURE: 84 MMHG | SYSTOLIC BLOOD PRESSURE: 130 MMHG | RESPIRATION RATE: 16 BRPM | OXYGEN SATURATION: 97 % | HEIGHT: 70 IN | WEIGHT: 280 LBS | BODY MASS INDEX: 40.09 KG/M2 | HEART RATE: 91 BPM

## 2019-02-19 DIAGNOSIS — J45.40 MODERATE PERSISTENT ASTHMA WITHOUT COMPLICATION: ICD-10-CM

## 2019-02-19 DIAGNOSIS — E66.01 MORBID OBESITY, UNSPECIFIED OBESITY TYPE (HCC): ICD-10-CM

## 2019-02-19 DIAGNOSIS — G47.33 OBSTRUCTIVE SLEEP APNEA: Primary | ICD-10-CM

## 2019-02-19 DIAGNOSIS — J30.89 OTHER ALLERGIC RHINITIS: ICD-10-CM

## 2019-02-19 PROCEDURE — 94726 PLETHYSMOGRAPHY LUNG VOLUMES: CPT | Performed by: INTERNAL MEDICINE

## 2019-02-19 PROCEDURE — 94729 DIFFUSING CAPACITY: CPT | Performed by: INTERNAL MEDICINE

## 2019-02-19 PROCEDURE — 94060 EVALUATION OF WHEEZING: CPT | Performed by: INTERNAL MEDICINE

## 2019-02-19 PROCEDURE — 99214 OFFICE O/P EST MOD 30 MIN: CPT | Performed by: INTERNAL MEDICINE

## 2019-02-19 RX ORDER — AZELASTINE 1 MG/ML
1 SPRAY, METERED NASAL 2 TIMES DAILY PRN
Qty: 1 EACH | Refills: 5 | Status: SHIPPED | OUTPATIENT
Start: 2019-02-19 | End: 2019-08-27 | Stop reason: SDUPTHER

## 2019-02-19 RX ORDER — MONTELUKAST SODIUM 10 MG/1
10 TABLET ORAL
Qty: 30 TABLET | Refills: 5 | Status: SHIPPED | OUTPATIENT
Start: 2019-02-19 | End: 2019-06-25

## 2019-02-19 NOTE — PROGRESS NOTES
"Chief Complaint   Patient presents with   • Follow-up   • Shortness of Breath       Subjective   Denzel Harding is a 58 y.o. male.     History of Present Illness   Patient comes back today for follow up of Sleep apnea. Patient doesn't report any issues with the machine or mask.     Patient says that the compliance with the use of the equipment is good.     Patient's symptoms of sleep disturbance & daytime sleepiness have been helped greatly with the use of PAP device, as prescribed.     Patient comes in today for follow up of asthma and shortness of breath. Patient does not report any recent exacerbations requiring emergency room visits or hospitalizations.    Patient is using the rescue inhalers minimally. Is compliant with pulmonary medicines, as prescribed.    Patient says that he has been using his nasal sprays on a regular basis but is noticing nasal congestion as well as occasional runny nose.      The following portions of the patient's history were reviewed and updated as appropriate: allergies, current medications, past family history, past medical history, past social history and past surgical history.    Review of Systems   HENT: Positive for sneezing. Negative for sinus pressure and sore throat.    Respiratory: Positive for cough and wheezing. Negative for shortness of breath.        Objective   Visit Vitals  /84   Pulse 91   Resp 16   Ht 177.8 cm (70\")   Wt 127 kg (280 lb)   SpO2 97%   BMI 40.18 kg/m²       Physical Exam   Constitutional: He is oriented to person, place, and time. He appears well-developed and well-nourished.   Poor personal hygiene noted.   HENT:   Head: Atraumatic.   Crowded oropharynx.    Eyes: EOM are normal. Pupils are equal, round, and reactive to light.   Neck: Neck supple.   Increased adipose tissue.    Cardiovascular: Normal rate and regular rhythm.   Pulmonary/Chest: Effort normal and breath sounds normal. No respiratory distress. He has no wheezes. He has no rales. "   Musculoskeletal:   Gait was normal.   Neurological: He is alert and oriented to person, place, and time.   Psychiatric: He has a normal mood and affect.   Vitals reviewed.      Assessment/Plan   Denzel was seen today for follow-up and shortness of breath.    Diagnoses and all orders for this visit:    Obstructive sleep apnea    Moderate persistent asthma without complication    Morbid obesity, unspecified obesity type (CMS/HCC)    Other allergic rhinitis    Other orders  -     azelastine (ASTELIN) 0.1 % nasal spray; 1 spray into the nostril(s) as directed by provider 2 (Two) Times a Day As Needed for Rhinitis or Allergies. Use in each nostril as directed  -     montelukast (SINGULAIR) 10 MG tablet; Take 1 tablet by mouth every night at bedtime.  -     Tiotropium Bromide Monohydrate (SPIRIVA RESPIMAT) 1.25 MCG/ACT aerosol solution inhaler; Inhale 2 puffs Daily.  -     Mometasone Furoate 100 MCG/ACT aerosol; Inhale 2 puffs 2 (Two) Times a Day. Rinse mouth with water after use.           Return in about 6 months (around 8/19/2019) for Recheck.    DISCUSSION (if any):  Patient's symptoms seem to be under good control with the current regimen.    I have made no changes to the medications and discussed with the patient in great detail the need for compliance with medications.    Side effects of prescribed medications discussed with the patient    Asthma action plan was discussed with the patient.    The patient was asked to call this office if the symptoms worsen    Patient was advised to start using Astelin nasal spray since his symptoms are consistent with likely poorly controlled allergic rhinitis.    I reviewed the patient's last sleep study which confirmed severe sleep apnea with an AHI of 57/h.    Continue treatment with BiPAP at a pressure of 13/5, with a full-face mask.    Patient's compliance data was reviewed and the compliance is greater than 70%    Humidification setup, hose and mask care  discussed.    Asked to have DME service the equipment atleast once every 3-6 months or so.    Personal hygiene measures and weight loss was strongly suggested and advised.    Use every night for atleast 4 hours stressed.      Dictated utilizing Dragon dictation.    This document was electronically signed by Linda Barajas MD on February 19, 2019 at 3:11 PM

## 2019-03-03 ENCOUNTER — HOSPITAL ENCOUNTER (EMERGENCY)
Facility: HOSPITAL | Age: 59
Discharge: HOME | End: 2019-03-03
Attending: EMERGENCY MEDICINE | Admitting: EMERGENCY MEDICINE

## 2019-03-03 ENCOUNTER — APPOINTMENT (OUTPATIENT)
Dept: CT IMAGING | Facility: HOSPITAL | Age: 59
End: 2019-03-03

## 2019-03-03 VITALS
WEIGHT: 281.8 LBS | DIASTOLIC BLOOD PRESSURE: 86 MMHG | RESPIRATION RATE: 16 BRPM | BODY MASS INDEX: 40.34 KG/M2 | HEIGHT: 70 IN | TEMPERATURE: 98.1 F | SYSTOLIC BLOOD PRESSURE: 152 MMHG | HEART RATE: 100 BPM | OXYGEN SATURATION: 98 %

## 2019-03-03 DIAGNOSIS — S20.211A CONTUSION OF RIGHT CHEST WALL, INITIAL ENCOUNTER: Primary | ICD-10-CM
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
## 2019-03-03 LAB
ALBUMIN SERPL-MCNC: 3.6 G/DL (ref 3.5–5)
ALBUMIN/GLOB SERPL: 1.3 G/DL (ref 1–2)
ALP SERPL-CCNC: 80 U/L (ref 38–126)
ALT SERPL W P-5'-P-CCNC: 55 U/L (ref 13–69)
ANION GAP SERPL CALCULATED.3IONS-SCNC: 10.5 MMOL/L (ref 10–20)
AST SERPL-CCNC: 30 U/L (ref 15–46)
BASOPHILS # BLD AUTO: 0.05 10*3/MM3 (ref 0–0.2)
BASOPHILS NFR BLD AUTO: 0.7 % (ref 0–2.5)
BILIRUB SERPL-MCNC: 0.7 MG/DL (ref 0.2–1.3)
BUN BLD-MCNC: 9 MG/DL (ref 7–20)
BUN/CREAT SERPL: 18 (ref 6.3–21.9)
CALCIUM SPEC-SCNC: 9.1 MG/DL (ref 8.4–10.2)
CHLORIDE SERPL-SCNC: 100 MMOL/L (ref 98–107)
CO2 SERPL-SCNC: 30 MMOL/L (ref 26–30)
CREAT BLD-MCNC: 0.5 MG/DL (ref 0.6–1.3)
DEPRECATED RDW RBC AUTO: 36.9 FL (ref 37–54)
EOSINOPHIL # BLD AUTO: 0.2 10*3/MM3 (ref 0–0.7)
EOSINOPHIL NFR BLD AUTO: 2.9 % (ref 0–7)
ERYTHROCYTE [DISTWIDTH] IN BLOOD BY AUTOMATED COUNT: 12.8 % (ref 11.5–14.5)
GFR SERPL CREATININE-BSD FRML MDRD: >150 ML/MIN/1.73
GLOBULIN UR ELPH-MCNC: 2.7 GM/DL
GLUCOSE BLD-MCNC: 310 MG/DL (ref 74–98)
HCT VFR BLD AUTO: 43.3 % (ref 42–52)
HGB BLD-MCNC: 14.6 G/DL (ref 14–18)
IMM GRANULOCYTES # BLD AUTO: 0.07 10*3/MM3 (ref 0–0.06)
IMM GRANULOCYTES NFR BLD AUTO: 1 % (ref 0–0.6)
LYMPHOCYTES # BLD AUTO: 1.82 10*3/MM3 (ref 0.6–3.4)
LYMPHOCYTES NFR BLD AUTO: 26.4 % (ref 10–50)
MCH RBC QN AUTO: 27.3 PG (ref 27–31)
MCHC RBC AUTO-ENTMCNC: 33.7 G/DL (ref 30–37)
MCV RBC AUTO: 80.9 FL (ref 80–94)
MONOCYTES # BLD AUTO: 0.44 10*3/MM3 (ref 0–0.9)
MONOCYTES NFR BLD AUTO: 6.4 % (ref 0–12)
NEUTROPHILS # BLD AUTO: 4.31 10*3/MM3 (ref 2–6.9)
NEUTROPHILS NFR BLD AUTO: 62.6 % (ref 37–80)
NRBC BLD AUTO-RTO: 0 /100 WBC (ref 0–0)
PLATELET # BLD AUTO: 179 10*3/MM3 (ref 130–400)
PMV BLD AUTO: 10.3 FL (ref 6–12)
POTASSIUM BLD-SCNC: 4.5 MMOL/L (ref 3.5–5.1)
PROT SERPL-MCNC: 6.3 G/DL (ref 6.3–8.2)
RBC # BLD AUTO: 5.35 10*6/MM3 (ref 4.7–6.1)
SODIUM BLD-SCNC: 136 MMOL/L (ref 137–145)
TROPONIN I SERPL-MCNC: <0.012 NG/ML (ref 0–0.03)
WBC NRBC COR # BLD: 6.89 10*3/MM3 (ref 4.8–10.8)

## 2019-03-03 PROCEDURE — 85025 COMPLETE CBC W/AUTO DIFF WBC: CPT | Performed by: PHYSICIAN ASSISTANT

## 2019-03-03 PROCEDURE — 96374 THER/PROPH/DIAG INJ IV PUSH: CPT

## 2019-03-03 PROCEDURE — 25010000002 KETOROLAC TROMETHAMINE PER 15 MG: Performed by: PHYSICIAN ASSISTANT

## 2019-03-03 PROCEDURE — 63710000001 PROMETHAZINE PER 12.5 MG: Performed by: EMERGENCY MEDICINE

## 2019-03-03 PROCEDURE — 99283 EMERGENCY DEPT VISIT LOW MDM: CPT

## 2019-03-03 PROCEDURE — 93005 ELECTROCARDIOGRAM TRACING: CPT | Performed by: PHYSICIAN ASSISTANT

## 2019-03-03 PROCEDURE — 72131 CT LUMBAR SPINE W/O DYE: CPT

## 2019-03-03 PROCEDURE — 84484 ASSAY OF TROPONIN QUANT: CPT | Performed by: PHYSICIAN ASSISTANT

## 2019-03-03 PROCEDURE — 71250 CT THORAX DX C-: CPT

## 2019-03-03 PROCEDURE — 80053 COMPREHEN METABOLIC PANEL: CPT | Performed by: PHYSICIAN ASSISTANT

## 2019-03-03 RX ORDER — HYDROCODONE BITARTRATE AND ACETAMINOPHEN 5; 325 MG/1; MG/1
1 TABLET ORAL ONCE
Status: COMPLETED | OUTPATIENT
Start: 2019-03-03 | End: 2019-03-03

## 2019-03-03 RX ORDER — KETOROLAC TROMETHAMINE 30 MG/ML
30 INJECTION, SOLUTION INTRAMUSCULAR; INTRAVENOUS ONCE
Status: COMPLETED | OUTPATIENT
Start: 2019-03-03 | End: 2019-03-03

## 2019-03-03 RX ORDER — SODIUM CHLORIDE 0.9 % (FLUSH) 0.9 %
10 SYRINGE (ML) INJECTION AS NEEDED
Status: DISCONTINUED | OUTPATIENT
Start: 2019-03-03 | End: 2019-03-03 | Stop reason: HOSPADM

## 2019-03-03 RX ORDER — PROMETHAZINE HYDROCHLORIDE 12.5 MG/1
12.5 TABLET ORAL ONCE
Status: COMPLETED | OUTPATIENT
Start: 2019-03-03 | End: 2019-03-03

## 2019-03-03 RX ADMIN — KETOROLAC TROMETHAMINE 30 MG: 30 INJECTION, SOLUTION INTRAMUSCULAR; INTRAVENOUS at 16:28

## 2019-03-03 RX ADMIN — PROMETHAZINE HYDROCHLORIDE 12.5 MG: 12.5 TABLET ORAL at 17:32

## 2019-03-03 RX ADMIN — HYDROCODONE BITARTRATE AND ACETAMINOPHEN 1 TABLET: 5; 325 TABLET ORAL at 17:32

## 2019-03-03 NOTE — ED PROVIDER NOTES
Subjective   Patient is a 58-year-old male with history of anxiety, asthma, CHF, COPD, CAD, diabetes, dizziness, palpitations, seizure, and stroke that presents to the ED for evaluation of chest wall pain after a fall.  Patient states he was walking into his bedroom yesterday afternoon.  He states he threw a case of pop onto the ground, felt dizzy and proceeded to fall.  He states that his right anterior chest wall struck the bedpost and he fell onto the ground. He does not believe he struck his head, but is unsure whether or not he lost consciousness.  He states that since then he has had chest wall pain that is worse with breathing.  He also complains of low back pain.  He was able to ambulate after the fall. He denies any recent fever, chills, cough, hemoptysis, abdominal pain, nausea, emesis, headache, vision loss or changes, extremity pain or deficits.             Review of Systems   All other systems reviewed and are negative.      Past Medical History:   Diagnosis Date   • Anxiety    • Arthritis    • Asthma    • BiPAP (biphasic positive airway pressure) dependence    • Brachial neuritis 1/19/2017   • Chest pain    • CHF (congestive heart failure) (CMS/[facility])    • COPD (chronic obstructive pulmonary disease) (CMS/[facility])    • Coronary artery disease    • Depression    • Diabetes mellitus (CMS/[facility])    • Dizziness    • Edema    • GERD (gastroesophageal reflux disease)    • H/O chest x-ray 07/04/2015    Mild Left base atelectasis   • H/O echocardiogram 07/05/2015    Normal LVSF. EF of 60-65%. Grade 1 diastolic dysfunction of the LV myocardium. No evidence of pericardial effusion   • H/O exercise stress test    • History of herniated intervertebral disc     History of left L5-S1 disc herniation   • Hypertension    • LOM (loss of memory) 1/19/2017   • Lower back pain     Right   • Measles    • Obstructive sleep apnea    • Palpitations    • Pericardial effusion    • Seizure disorder (CMS/[facility])    • Seizures (CMS/[facility])      FOCAL    • Shortness of breath 1/19/2017   • SOB (shortness of breath)    • Stroke (CMS/HCC)    • Wears glasses        Allergies   Allergen Reactions   • Sulfa Antibiotics Anaphylaxis, Nausea And Vomiting and Delirium   • Codeine Nausea And Vomiting     Can only take with Phenergan   • Invokana [Canagliflozin]      DKA   • Morphine      Does not work. It causes pain       Past Surgical History:   Procedure Laterality Date   • BACK SURGERY     • CARDIAC CATHETERIZATION     • CARDIAC CATHETERIZATION N/A 8/11/2017    Procedure: Coronary angiography;  Surgeon: Dallas Kim MD;  Location: [facility] CATH INVASIVE LOCATION;  Service:    • CARDIAC CATHETERIZATION N/A 8/11/2017    Procedure: Left Heart Cath;  Surgeon: Dallas Kim MD;  Location: [facility] CATH INVASIVE LOCATION;  Service:    • CARDIAC CATHETERIZATION N/A 8/11/2017    Procedure: Left ventriculography;  Surgeon: Dallas Kim MD;  Location: [facility] CATH INVASIVE LOCATION;  Service:    • CARDIOVASCULAR STRESS TEST  07/03/2017    WITH DR KIM AT [facility]   • CARPAL TUNNEL RELEASE Right    • CATARACT EXTRACTION W/ INTRAOCULAR LENS IMPLANT Right 6/12/2017    Procedure: CATARACT PHACO EXTRACTION WITH INTRAOCULAR LENS IMPLANT RIGHT ;  Surgeon: Natalie Cao MD;  Location: [facility] OR;  Service:    • CATARACT EXTRACTION W/ INTRAOCULAR LENS IMPLANT Left 7/10/2017    Procedure: CATARACT PHACO EXTRACTION WITH INTRAOCULAR LENS IMPLANT LEFT;  Surgeon: Natalie Cao MD;  Location: [facility] OR;  Service:    • CHOLECYSTECTOMY     • CHOLECYSTECTOMY     • COLONOSCOPY     • CORONARY ANGIOPLASTY WITH STENT PLACEMENT     • CORONARY ANGIOPLASTY WITH STENT PLACEMENT      X1-LAD   • CORONARY ARTERY BYPASS GRAFT N/A 8/15/2017    Procedure: CORONARY ARTERY BYPASS GRAFTING x 3 UTILIZING THE LEFT INTERNAL MAMMARY ARTERY WITH ENDOSCOPIC VEIN HARVESTING OF THE RIGHT GREATER SAPHENOUS VEIN, HAMLET, LAD ENDARECTOMY;  Surgeon: London Damon MD;  Location: [facility] OR;  Service:    • ENDOSCOPY      • EYE SURGERY      cataract surgery both eyes   • KNEE ARTHROSCOPY Bilateral    • NEUROPLASTY / TRANSPOSITION ULNAR NERVE AT ELBOW     • OTHER SURGICAL HISTORY      Foraminotomy and discectomy   • PERICARDIAL WINDOW N/A 2017    Procedure: PERICARDIAL WINDOW;  Surgeon: Freeman Phillips MD;  Location: [facility];  Service:        Family History   Problem Relation Age of Onset   • Hypertension Mother    • Arthritis Mother    • Alcohol abuse Father    • Heart disease Other    • Stroke Other    • Hypertension Other    • Other Other         Neurologic disorder   • Parkinsonism Other    • Stroke Other    • Heart disease Other    • Hypertension Other    • Heart disease Other    • Stroke Other    • Hypertension Other        Social History     Socioeconomic History   • Marital status:      Spouse name: Not on file   • Number of children: 1   • Years of education: Not on file   • Highest education level: Not on file   Occupational History   • Occupation: [employer]     Comment: Disabled since -Back Problems   Tobacco Use   • Smoking status: Former Smoker     Packs/day: 1.00     Years: 10.00     Pack years: 10.00     Types: Cigarettes     Last attempt to quit: 1998     Years since quittin.1   • Smokeless tobacco: Former User     Types: Snuff     Quit date: 2017   Substance and Sexual Activity   • Alcohol use: Yes     Comment: 3 PER YEAR   • Drug use: No   • Sexual activity: Defer   Social History Narrative    Caffeine use: 0 serving daily.    Patient lives at home with wife.            Objective   Physical Exam   Nursing note and vitals reviewed.    GEN: No acute distress, sitting upright in the stretcher.  He is awake, alert, speaking full sentences.  Head: Normocephalic, atraumatic  Eyes: Pupils equal round reactive to light, EOM intact  ENT: Posterior pharynx normal in appearance, oral mucosa is moist, tongue midline  Chest: Moderately tender over right anterior chest wall  without obvious deformity or ecchymosis.  Cardiovascular: Regular rate and rhythm  Lungs: Poor air movement throughout. Clear to auscultation bilaterally without adventitious sounds  Abdomen: Large but nondistended.  Soft, bowel sounds present in all 4 quadrants  Extremities: No edema, normal appearance, full ROM without deficits.  Radial and posterior tibialis pulses are 2+ and equal  Back: Straight.  Nontender over cervical and thoracic vertebrae midline.  Moderately tender over midline lumbar vertebrae and coccyx.  Neuro: GCS 15  Psych: Mood and affect are appropriate    Procedures           ED Course  ED Course as of Mar 04 0038   Sun Mar 03, 2019   1542 EKG interpreted by me: Sinus rhythm, normal rate, normal axis/intervals, no acute ST/T changes, this is a normal EKG  [MP]   1808 Discussed all results with patient.  He likely has a chest contusion that is causing pain.  Will send home with incentive spirometer.  Discussed pain management and symptomatic treatment.  Discussed follow-up and strict return precautions.  [LA]      ED Course User Index  [LA] Viviana Martinez PA-C  [MP] Adonay Pabon MD                  MDM  Number of Diagnoses or Management Options  Contusion of right chest wall, initial encounter:   Fall, initial encounter:   Diagnosis management comments: On arrival, patient is afebrile, mildly hypertensive but nontoxic in appearance.  Differential includes contusion, rib fracture, lung contusion, fracture, musculoskeletal strain, cardiac arrhythmia, dehydration, hypoglycemia, and other concerns.  Lower concern for aortic dissection, intracranial hemorrhage, skull fracture.  Will obtain basic labs including troponin and EKG to evaluate for cardiac arrhythmias.  Will obtain CT chest and lumbar to evaluate for fracture.  Will give Toradol for pain.    Labs unremarkable.  EKG showed a sinus rhythm.  CT of the chest and lumbar spine show no acute fracture.  Patient still complaining of pain  was given Norco and phenergan.  Pain is likely from contusion.  We discussed symptomatic treatment.  We also discussed follow-up and strict return precautions.  Patient verbalized understanding and was agreeable this plan of care.  He was discharged home in stable condition.       Amount and/or Complexity of Data Reviewed  Clinical lab tests: reviewed and ordered  Tests in the radiology section of CPT®: reviewed and ordered    Risk of Complications, Morbidity, and/or Mortality  Presenting problems: moderate  Diagnostic procedures: moderate  Management options: low    Patient Progress  Patient progress: stable        Final diagnoses:   Contusion of right chest wall, initial encounter   Fall, initial encounter            Viviana Martinez PA-C  03/04/19 0038

## 2019-03-03 NOTE — DISCHARGE INSTRUCTIONS
Pain is likely due to contusion and muscle strain of the chest.  Take all of your home medications as prescribed.  Use incentive spirometer as directed.  Take over-the-counter ibuprofen and Tylenol to help with pain-400 mg ibuprofen and/or 500 mg Tylenol every 6 hours as needed.  Follow-up with your primary care provider in the next 1-2 days for reevaluation.  Return to the ER for any change, worsening symptoms, or any additional concerns including but not limited to difficulty breathing, chest pain, productive cough with fever greater than 100.4, hemoptysis.

## 2019-03-03 NOTE — ED NOTES
Viviana lopez notified regarding pts needing pain medications and nausea meds to keep from getting sick from the pain medication. Orders received and given     Tuyet Rivas RN  03/03/19 0000

## 2019-03-10 ENCOUNTER — APPOINTMENT (OUTPATIENT)
Dept: GENERAL RADIOLOGY | Facility: HOSPITAL | Age: 59
End: 2019-03-10

## 2019-03-10 ENCOUNTER — HOSPITAL ENCOUNTER (EMERGENCY)
Facility: HOSPITAL | Age: 59
Discharge: HOME | End: 2019-03-10
Attending: EMERGENCY MEDICINE | Admitting: EMERGENCY MEDICINE

## 2019-03-10 VITALS
TEMPERATURE: 98.8 F | RESPIRATION RATE: 17 BRPM | HEART RATE: 72 BPM | DIASTOLIC BLOOD PRESSURE: 81 MMHG | SYSTOLIC BLOOD PRESSURE: 147 MMHG | HEIGHT: 70 IN | OXYGEN SATURATION: 94 % | WEIGHT: 280 LBS | BODY MASS INDEX: 40.09 KG/M2

## 2019-03-10 DIAGNOSIS — R05.9 COUGH: ICD-10-CM

## 2019-03-10 DIAGNOSIS — R07.81 RIB PAIN: Primary | ICD-10-CM

## 2019-03-10 PROCEDURE — 99282 EMERGENCY DEPT VISIT SF MDM: CPT

## 2019-03-10 PROCEDURE — 71046 X-RAY EXAM CHEST 2 VIEWS: CPT

## 2019-03-10 NOTE — ED PROVIDER NOTES
Subjective   58-year-old male presents with right chest and rib pain, he states that he was seen in the emergency department last week after a fall and hitting the right side of his chest and ribs on a bedpost.  He initially improved but now is having pain on the right side and coughing up green sputum, he is concerned he may be developing pneumonia        History provided by:  Patient   used: No        Review of Systems   Musculoskeletal:        Right rib pain   All other systems reviewed and are negative.      Past Medical History:   Diagnosis Date   • Anxiety    • Arthritis    • Asthma    • BiPAP (biphasic positive airway pressure) dependence    • Brachial neuritis 1/19/2017   • Chest pain    • CHF (congestive heart failure) (CMS/[facility])    • COPD (chronic obstructive pulmonary disease) (CMS/[facility])    • Coronary artery disease    • Depression    • Diabetes mellitus (CMS/[facility])    • Dizziness    • Edema    • GERD (gastroesophageal reflux disease)    • H/O chest x-ray 07/04/2015    Mild Left base atelectasis   • H/O echocardiogram 07/05/2015    Normal LVSF. EF of 60-65%. Grade 1 diastolic dysfunction of the LV myocardium. No evidence of pericardial effusion   • H/O exercise stress test    • History of herniated intervertebral disc     History of left L5-S1 disc herniation   • Hypertension    • LOM (loss of memory) 1/19/2017   • Lower back pain     Right   • Measles    • Obstructive sleep apnea    • Palpitations    • Pericardial effusion    • Seizure disorder (CMS/[facility])    • Seizures (CMS/[facility])     FOCAL    • Shortness of breath 1/19/2017   • SOB (shortness of breath)    • Stroke (CMS/[facility])    • Wears glasses        Allergies   Allergen Reactions   • Sulfa Antibiotics Anaphylaxis, Nausea And Vomiting and Delirium   • Codeine Nausea And Vomiting     Can only take with Phenergan   • Invokana [Canagliflozin]      DKA   • Morphine      Does not work. It causes pain       Past Surgical History:   Procedure  Laterality Date   • BACK SURGERY     • CARDIAC CATHETERIZATION     • CARDIAC CATHETERIZATION N/A 8/11/2017    Procedure: Coronary angiography;  Surgeon: Dallas Hernandez MD;  Location: [facility] CATH INVASIVE LOCATION;  Service:    • CARDIAC CATHETERIZATION N/A 8/11/2017    Procedure: Left Heart Cath;  Surgeon: Dallas Hernandez MD;  Location: [facility] CATH INVASIVE LOCATION;  Service:    • CARDIAC CATHETERIZATION N/A 8/11/2017    Procedure: Left ventriculography;  Surgeon: Dallas Hernandez MD;  Location: [facility] CATH INVASIVE LOCATION;  Service:    • CARDIOVASCULAR STRESS TEST  07/03/2017    WITH DR HERNANDEZ AT [facility]   • CARPAL TUNNEL RELEASE Right    • CATARACT EXTRACTION W/ INTRAOCULAR LENS IMPLANT Right 6/12/2017    Procedure: CATARACT PHACO EXTRACTION WITH INTRAOCULAR LENS IMPLANT RIGHT ;  Surgeon: Natalie Cao MD;  Location: [facility] OR;  Service:    • CATARACT EXTRACTION W/ INTRAOCULAR LENS IMPLANT Left 7/10/2017    Procedure: CATARACT PHACO EXTRACTION WITH INTRAOCULAR LENS IMPLANT LEFT;  Surgeon: Natalie Cao MD;  Location: [facility] OR;  Service:    • CHOLECYSTECTOMY     • CHOLECYSTECTOMY     • COLONOSCOPY     • CORONARY ANGIOPLASTY WITH STENT PLACEMENT     • CORONARY ANGIOPLASTY WITH STENT PLACEMENT      X1-LAD   • CORONARY ARTERY BYPASS GRAFT N/A 8/15/2017    Procedure: CORONARY ARTERY BYPASS GRAFTING x 3 UTILIZING THE LEFT INTERNAL MAMMARY ARTERY WITH ENDOSCOPIC VEIN HARVESTING OF THE RIGHT GREATER SAPHENOUS VEIN, HAMLET, LAD ENDARECTOMY;  Surgeon: London Damon MD;  Location: [facility] OR;  Service:    • ENDOSCOPY     • EYE SURGERY      cataract surgery both eyes   • KNEE ARTHROSCOPY Bilateral    • NEUROPLASTY / TRANSPOSITION ULNAR NERVE AT ELBOW     • OTHER SURGICAL HISTORY      Foraminotomy and discectomy   • PERICARDIAL WINDOW N/A 8/25/2017    Procedure: PERICARDIAL WINDOW;  Surgeon: Freeman Phillips MD;  Location: [facility] OR;  Service:        Family History   Problem Relation Age of Onset   • Hypertension Mother     • Arthritis Mother    • Alcohol abuse Father    • Heart disease Other    • Stroke Other    • Hypertension Other    • Other Other         Neurologic disorder   • Parkinsonism Other    • Stroke Other    • Heart disease Other    • Hypertension Other    • Heart disease Other    • Stroke Other    • Hypertension Other        Social History     Socioeconomic History   • Marital status:      Spouse name: Not on file   • Number of children: 1   • Years of education: Not on file   • Highest education level: Not on file   Occupational History   • Occupation: [employer]     Comment: Disabled since -Back Problems   Tobacco Use   • Smoking status: Former Smoker     Packs/day: 1.00     Years: 10.00     Pack years: 10.00     Types: Cigarettes     Last attempt to quit: 1998     Years since quittin.2   • Smokeless tobacco: Former User     Types: Snuff     Quit date: 2017   Substance and Sexual Activity   • Alcohol use: Yes     Comment: 3 PER YEAR   • Drug use: No   • Sexual activity: Defer   Social History Narrative    Caffeine use: 0 serving daily.    Patient lives at home with wife.            Objective   Physical Exam   Constitutional: He is oriented to person, place, and time. He appears well-developed and well-nourished.   HENT:   Head: Normocephalic and atraumatic.   Eyes: EOM are normal. Pupils are equal, round, and reactive to light.   Neck: Normal range of motion.   Cardiovascular: Normal rate and regular rhythm.   Pulmonary/Chest: Effort normal and breath sounds normal. No stridor. No respiratory distress.   Abdominal: Soft. Bowel sounds are normal.   Musculoskeletal: Normal range of motion. He exhibits tenderness.   Tenderness to palpation right chest wall and rib area   Neurological: He is alert and oriented to person, place, and time.   Skin: Skin is warm and dry.   Psychiatric: He has a normal mood and affect. His behavior is normal. Judgment and thought content normal.    Nursing note and vitals reviewed.      Procedures           ED Course                  MDM  Number of Diagnoses or Management Options  Cough: new and requires workup  Rib pain: new and requires workup     Amount and/or Complexity of Data Reviewed  Tests in the radiology section of CPT®: reviewed  Independent visualization of images, tracings, or specimens: yes    Risk of Complications, Morbidity, and/or Mortality  Presenting problems: minimal  Diagnostic procedures: minimal  Management options: minimal    Patient Progress  Patient progress: stable        Final diagnoses:   Rib pain   Cough            Rohit Kim Jr., PA-C  03/10/19 5350

## 2019-03-18 RX ORDER — AMITRIPTYLINE HYDROCHLORIDE 25 MG/1
75 TABLET, FILM COATED ORAL NIGHTLY
Qty: 90 TABLET | Refills: 11 | Status: SHIPPED | OUTPATIENT
Start: 2019-03-18 | End: 2019-12-17 | Stop reason: SDUPTHER

## 2019-04-16 RX ORDER — METOPROLOL TARTRATE 50 MG/1
TABLET, FILM COATED ORAL
Qty: 180 TABLET | Refills: 3 | Status: SHIPPED | OUTPATIENT
Start: 2019-04-16 | End: 2020-06-08

## 2019-05-20 ENCOUNTER — OFFICE VISIT (OUTPATIENT)
Dept: PULMONOLOGY | Facility: CLINIC | Age: 59
End: 2019-05-20

## 2019-05-20 VITALS
RESPIRATION RATE: 16 BRPM | OXYGEN SATURATION: 95 % | BODY MASS INDEX: 43.95 KG/M2 | DIASTOLIC BLOOD PRESSURE: 72 MMHG | SYSTOLIC BLOOD PRESSURE: 128 MMHG | WEIGHT: 307 LBS | HEART RATE: 85 BPM | HEIGHT: 70 IN

## 2019-05-20 DIAGNOSIS — G47.33 OSA (OBSTRUCTIVE SLEEP APNEA): ICD-10-CM

## 2019-05-20 DIAGNOSIS — R06.02 SHORTNESS OF BREATH: ICD-10-CM

## 2019-05-20 DIAGNOSIS — J45.51 SEVERE PERSISTENT ASTHMA WITH ACUTE EXACERBATION: Primary | ICD-10-CM

## 2019-05-20 DIAGNOSIS — J30.89 OTHER ALLERGIC RHINITIS: ICD-10-CM

## 2019-05-20 DIAGNOSIS — E66.01 MORBID OBESITY, UNSPECIFIED OBESITY TYPE (HCC): ICD-10-CM

## 2019-05-20 PROCEDURE — 95012 NITRIC OXIDE EXP GAS DETER: CPT | Performed by: INTERNAL MEDICINE

## 2019-05-20 PROCEDURE — 96372 THER/PROPH/DIAG INJ SC/IM: CPT | Performed by: INTERNAL MEDICINE

## 2019-05-20 PROCEDURE — 99214 OFFICE O/P EST MOD 30 MIN: CPT | Performed by: INTERNAL MEDICINE

## 2019-05-20 RX ORDER — IPRATROPIUM BROMIDE AND ALBUTEROL SULFATE 2.5; .5 MG/3ML; MG/3ML
3 SOLUTION RESPIRATORY (INHALATION) 4 TIMES DAILY PRN
Qty: 360 ML | Refills: 5 | Status: SHIPPED | OUTPATIENT
Start: 2019-05-20 | End: 2020-03-02 | Stop reason: SDUPTHER

## 2019-05-20 RX ORDER — METHYLPREDNISOLONE ACETATE 80 MG/ML
80 INJECTION, SUSPENSION INTRA-ARTICULAR; INTRALESIONAL; INTRAMUSCULAR; SOFT TISSUE ONCE
Status: COMPLETED | OUTPATIENT
Start: 2019-05-20 | End: 2019-05-20

## 2019-05-20 RX ORDER — DEXAMETHASONE SODIUM PHOSPHATE 4 MG/ML
4 INJECTION, SOLUTION INTRA-ARTICULAR; INTRALESIONAL; INTRAMUSCULAR; INTRAVENOUS; SOFT TISSUE ONCE
Status: COMPLETED | OUTPATIENT
Start: 2019-05-20 | End: 2019-05-20

## 2019-05-20 RX ADMIN — METHYLPREDNISOLONE ACETATE 80 MG: 80 INJECTION, SUSPENSION INTRA-ARTICULAR; INTRALESIONAL; INTRAMUSCULAR; SOFT TISSUE at 15:43

## 2019-05-20 RX ADMIN — DEXAMETHASONE SODIUM PHOSPHATE 4 MG: 4 INJECTION, SOLUTION INTRA-ARTICULAR; INTRALESIONAL; INTRAMUSCULAR; INTRAVENOUS; SOFT TISSUE at 15:42

## 2019-05-20 NOTE — PROGRESS NOTES
"Chief Complaint   Patient presents with   • Follow-up   • Sleeping Problem       Subjective   Denzel Harding is a 59 y.o. male.     History of Present Illness   Patient comes in today complaining of shortness of breath, cough and phlegm production which has been worse for the past few days.    he is not complaining of subjective chills.     Patient is using rescue inhaler/nebulizer more than usual with some relief.    Patient says that he has been using his nasal sprays on a regular basis and describes no significant ongoing issues other than occasional congestion.     Patient doesn't report any issues with the device or mask.     Patient says that the compliance with the use of the equipment is good.     Patient says that his symptoms of sleep disturbance & daytime sleepiness have been helped greatly with the use of PAP, as prescribed.       The following portions of the patient's history were reviewed and updated as appropriate: allergies, current medications, past family history, past medical history, past social history and past surgical history.    Review of Systems   Constitutional: Negative for fever.   HENT: Negative for sinus pressure, sneezing and sore throat.    Respiratory: Positive for cough, chest tightness, shortness of breath and wheezing.        Objective   Visit Vitals  /72   Pulse 85   Resp 16   Ht 177.8 cm (70\")   Wt (!) 139 kg (307 lb)   SpO2 95%   BMI 44.05 kg/m²       Physical Exam   Constitutional: He is oriented to person, place, and time. He appears well-developed and well-nourished.   HENT:   Head: Atraumatic.   Crowded oropharynx.    Eyes: EOM are normal.   Neck: Normal range of motion. No JVD present. No thyromegaly present.   Cardiovascular: Normal rate.   Pulmonary/Chest: He is in respiratory distress. He has wheezes.   Musculoskeletal: Normal range of motion.   Used a cane.    Neurological: He is alert and oriented to person, place, and time.   Skin: Skin is warm and dry. "   Psychiatric: He has a normal mood and affect. His behavior is normal.   Vitals reviewed.      Assessment/Plan   Denzel was seen today for follow-up and sleeping problem.    Diagnoses and all orders for this visit:    Severe persistent asthma with acute exacerbation  -     Nitric Oxide  -     Peak Flow  -     methylPREDNISolone acetate (DEPO-medrol) injection 80 mg  -     dexamethasone (DECADRON) injection 4 mg  -     Home Nebulizer    Other allergic rhinitis    EDD (obstructive sleep apnea)    Morbid obesity, unspecified obesity type (CMS/HCC)    Shortness of breath  -     Home Nebulizer    Other orders  -     ipratropium-albuterol (DUO-NEB) 0.5-2.5 mg/3 ml nebulizer; Take 3 mL by nebulization 4 (Four) Times a Day As Needed for Wheezing or Shortness of Air.         Return in about 4 months (around 9/20/2019) for Recheck, For Matilde.    DISCUSSION (if any):  Laboratory workup was also reviewed which showed   Lab Results   Component Value Date    EOSABS 0.20 03/03/2019    EOSABS 0.19 11/25/2018    EOSABS 0.20 11/23/2018      Lab Results   Component Value Date     (A) 06/17/2016     FeNO level was 27 today.    Peak flow was 400 LPM today.    For the symptoms of acute exacerbation of asthma, he was prescribed intramuscular steroids.    He is on a sliding scale for insulin and I told him to follow it strictly, atleast for the next 4-5 days, since the steroids will increase his blood glucose levels.     Side effects have been discussed in detail.    Patient was asked to report to the emergency room if symptoms do not improve.    All other medications will remain the same.    Patient was advised to continue his nasal spray, especially given improvement in symptoms overall.    Continue treatment with BiPAP at a pressure of 13/5, with a nasal pillows.    Patient seems to be compliant with PAP device, based on the available data and his account of improved symptoms.     Weight loss advised.    The patient was once  again reminded to continue using the PAP device regularly, every night for atleast 4 hours.    Will consider repeating IgE/RAST panel and CBC upon follow up visit.       Dictated utilizing Dragon dictation.    This document was electronically signed by Linda Barajas MD on 05/20/19 at 1:35 PM

## 2019-05-22 ENCOUNTER — HOSPITAL ENCOUNTER (INPATIENT)
Facility: HOSPITAL | Age: 59
LOS: 14 days | Discharge: HOME | End: 2019-06-05
Attending: INTERNAL MEDICINE | Admitting: INTERNAL MEDICINE

## 2019-05-22 ENCOUNTER — APPOINTMENT (OUTPATIENT)
Dept: GENERAL RADIOLOGY | Facility: HOSPITAL | Age: 59
End: 2019-05-22

## 2019-05-22 ENCOUNTER — TELEPHONE (OUTPATIENT)
Dept: PULMONOLOGY | Facility: CLINIC | Age: 59
End: 2019-05-22

## 2019-05-22 DIAGNOSIS — Z78.9 IMPAIRED MOBILITY AND ADLS: Primary | ICD-10-CM

## 2019-05-22 DIAGNOSIS — Z74.09 IMPAIRED FUNCTIONAL MOBILITY, BALANCE, GAIT, AND ENDURANCE: ICD-10-CM

## 2019-05-22 DIAGNOSIS — Z74.09 IMPAIRED MOBILITY AND ADLS: Primary | ICD-10-CM

## 2019-05-22 PROBLEM — J45.901 ACUTE EXACERBATION OF ASTHMA WITH ALLERGIC RHINITIS: Status: ACTIVE | Noted: 2019-05-22

## 2019-05-22 PROBLEM — G47.33 OBSTRUCTIVE SLEEP APNEA SYNDROME: Status: ACTIVE | Noted: 2019-05-22

## 2019-05-22 LAB
A-A DO2: ABNORMAL MMHG
ALBUMIN SERPL-MCNC: 4 G/DL (ref 3.5–5)
ALBUMIN/GLOB SERPL: 1.5 G/DL (ref 1–2)
ALP SERPL-CCNC: 73 U/L (ref 38–126)
ALT SERPL W P-5'-P-CCNC: 62 U/L (ref 13–69)
ANION GAP SERPL CALCULATED.3IONS-SCNC: 14.5 MMOL/L (ref 10–20)
ARTERIAL PATENCY WRIST A: POSITIVE
AST SERPL-CCNC: 46 U/L (ref 15–46)
ATMOSPHERIC PRESS: 734 MMHG
BASE EXCESS BLDA CALC-SCNC: 3 MMOL/L (ref 0–2)
BASOPHILS # BLD AUTO: 0.05 10*3/MM3 (ref 0–0.2)
BASOPHILS NFR BLD AUTO: 0.7 % (ref 0–1.5)
BDY SITE: ABNORMAL
BILIRUB SERPL-MCNC: 0.8 MG/DL (ref 0.2–1.3)
BUN BLD-MCNC: 14 MG/DL (ref 7–20)
BUN/CREAT SERPL: 20 (ref 6.3–21.9)
CALCIUM SPEC-SCNC: 8.7 MG/DL (ref 8.4–10.2)
CHLORIDE SERPL-SCNC: 99 MMOL/L (ref 98–107)
CO2 SERPL-SCNC: 29 MMOL/L (ref 26–30)
COHGB MFR BLD: <0.7 % (ref 0–2)
CREAT BLD-MCNC: 0.7 MG/DL (ref 0.6–1.3)
D-LACTATE SERPL-SCNC: 1.8 MMOL/L (ref 0.5–2)
DEPRECATED RDW RBC AUTO: 40.7 FL (ref 37–54)
EOSINOPHIL # BLD AUTO: 0.21 10*3/MM3 (ref 0–0.4)
EOSINOPHIL NFR BLD AUTO: 3.1 % (ref 0.3–6.2)
ERYTHROCYTE [DISTWIDTH] IN BLOOD BY AUTOMATED COUNT: 13.9 % (ref 12.3–15.4)
GFR SERPL CREATININE-BSD FRML MDRD: 116 ML/MIN/1.73
GLOBULIN UR ELPH-MCNC: 2.7 GM/DL
GLUCOSE BLD-MCNC: 297 MG/DL (ref 74–98)
GLUCOSE BLDC GLUCOMTR-MCNC: 282 MG/DL (ref 70–130)
GLUCOSE BLDC GLUCOMTR-MCNC: 288 MG/DL (ref 70–130)
HCO3 BLDA-SCNC: 27.2 MMOL/L (ref 22–28)
HCT VFR BLD AUTO: 41.2 % (ref 37.5–51)
HCT VFR BLD CALC: 41.1 %
HGB BLD-MCNC: 13.8 G/DL (ref 13–17.7)
HGB BLDA-MCNC: 13.4 G/DL (ref 12–18)
HOROWITZ INDEX BLD+IHG-RTO: 21 %
IMM GRANULOCYTES # BLD AUTO: 0.14 10*3/MM3 (ref 0–0.05)
IMM GRANULOCYTES NFR BLD AUTO: 2.1 % (ref 0–0.5)
LYMPHOCYTES # BLD AUTO: 1.81 10*3/MM3 (ref 0.7–3.1)
LYMPHOCYTES NFR BLD AUTO: 26.5 % (ref 19.6–45.3)
MCH RBC QN AUTO: 27.1 PG (ref 26.6–33)
MCHC RBC AUTO-ENTMCNC: 33.5 G/DL (ref 31.5–35.7)
MCV RBC AUTO: 80.9 FL (ref 79–97)
METHGB BLD QL: 0.8 % (ref 0–1.5)
MODALITY: ABNORMAL
MONOCYTES # BLD AUTO: 0.51 10*3/MM3 (ref 0.1–0.9)
MONOCYTES NFR BLD AUTO: 7.5 % (ref 5–12)
NEUTROPHILS # BLD AUTO: 4.1 10*3/MM3 (ref 1.7–7)
NEUTROPHILS NFR BLD AUTO: 60.1 % (ref 42.7–76)
NOTE: ABNORMAL
NRBC BLD AUTO-RTO: 0 /100 WBC (ref 0–0.2)
NT-PROBNP SERPL-MCNC: 173 PG/ML (ref 0–125)
OXYHGB MFR BLDV: 94.4 % (ref 94–99)
PCO2 BLDA: 39.7 MM HG (ref 35–45)
PCO2 TEMP ADJ BLD: ABNORMAL MM HG (ref 35–48)
PH BLDA: 7.45 PH UNITS (ref 7.3–7.5)
PH, TEMP CORRECTED: ABNORMAL PH UNITS
PLATELET # BLD AUTO: 152 10*3/MM3 (ref 140–450)
PMV BLD AUTO: 9.9 FL (ref 6–12)
PO2 BLDA: 75.7 MM HG (ref 75–100)
PO2 TEMP ADJ BLD: ABNORMAL MM HG (ref 83–108)
POTASSIUM BLD-SCNC: 4.5 MMOL/L (ref 3.5–5.1)
PROCALCITONIN SERPL-MCNC: 0.06 NG/ML
PROT SERPL-MCNC: 6.7 G/DL (ref 6.3–8.2)
RBC # BLD AUTO: 5.09 10*6/MM3 (ref 4.14–5.8)
SAO2 % BLDCOA: 95.7 % (ref 94–100)
SODIUM BLD-SCNC: 138 MMOL/L (ref 137–145)
VENTILATOR MODE: ABNORMAL
WBC NRBC COR # BLD: 6.82 10*3/MM3 (ref 3.4–10.8)

## 2019-05-22 PROCEDURE — 82805 BLOOD GASES W/O2 SATURATION: CPT

## 2019-05-22 PROCEDURE — 94799 UNLISTED PULMONARY SVC/PX: CPT

## 2019-05-22 PROCEDURE — 99219 PR INITIAL OBSERVATION CARE/DAY 50 MINUTES: CPT | Performed by: INTERNAL MEDICINE

## 2019-05-22 PROCEDURE — G0378 HOSPITAL OBSERVATION PER HR: HCPCS

## 2019-05-22 PROCEDURE — 71045 X-RAY EXAM CHEST 1 VIEW: CPT

## 2019-05-22 PROCEDURE — 80053 COMPREHEN METABOLIC PANEL: CPT | Performed by: INTERNAL MEDICINE

## 2019-05-22 PROCEDURE — 25010000002 ENOXAPARIN PER 10 MG: Performed by: INTERNAL MEDICINE

## 2019-05-22 PROCEDURE — 36600 WITHDRAWAL OF ARTERIAL BLOOD: CPT

## 2019-05-22 PROCEDURE — 63710000001 INSULIN ASPART PER 5 UNITS: Performed by: INTERNAL MEDICINE

## 2019-05-22 PROCEDURE — 83036 HEMOGLOBIN GLYCOSYLATED A1C: CPT | Performed by: INTERNAL MEDICINE

## 2019-05-22 PROCEDURE — 82962 GLUCOSE BLOOD TEST: CPT

## 2019-05-22 PROCEDURE — 82375 ASSAY CARBOXYHB QUANT: CPT

## 2019-05-22 PROCEDURE — 84145 PROCALCITONIN (PCT): CPT | Performed by: INTERNAL MEDICINE

## 2019-05-22 PROCEDURE — 25010000002 METHYLPREDNISOLONE PER 40 MG: Performed by: INTERNAL MEDICINE

## 2019-05-22 PROCEDURE — 83605 ASSAY OF LACTIC ACID: CPT | Performed by: INTERNAL MEDICINE

## 2019-05-22 PROCEDURE — 85025 COMPLETE CBC W/AUTO DIFF WBC: CPT | Performed by: INTERNAL MEDICINE

## 2019-05-22 PROCEDURE — 94640 AIRWAY INHALATION TREATMENT: CPT

## 2019-05-22 PROCEDURE — 83050 HGB METHEMOGLOBIN QUAN: CPT

## 2019-05-22 PROCEDURE — 83880 ASSAY OF NATRIURETIC PEPTIDE: CPT | Performed by: INTERNAL MEDICINE

## 2019-05-22 RX ORDER — CHOLECALCIFEROL (VITAMIN D3) 125 MCG
5 CAPSULE ORAL NIGHTLY PRN
Status: DISCONTINUED | OUTPATIENT
Start: 2019-05-22 | End: 2019-06-05 | Stop reason: HOSPADM

## 2019-05-22 RX ORDER — POTASSIUM CHLORIDE 20 MEQ/1
20 TABLET, EXTENDED RELEASE ORAL DAILY
Status: DISCONTINUED | OUTPATIENT
Start: 2019-05-22 | End: 2019-05-22

## 2019-05-22 RX ORDER — IPRATROPIUM BROMIDE AND ALBUTEROL SULFATE 2.5; .5 MG/3ML; MG/3ML
3 SOLUTION RESPIRATORY (INHALATION) EVERY 4 HOURS PRN
Status: DISCONTINUED | OUTPATIENT
Start: 2019-05-22 | End: 2019-05-26 | Stop reason: SDUPTHER

## 2019-05-22 RX ORDER — SODIUM CHLORIDE 0.9 % (FLUSH) 0.9 %
3 SYRINGE (ML) INJECTION EVERY 12 HOURS SCHEDULED
Status: DISCONTINUED | OUTPATIENT
Start: 2019-05-22 | End: 2019-06-05 | Stop reason: HOSPADM

## 2019-05-22 RX ORDER — FUROSEMIDE 40 MG/1
40 TABLET ORAL DAILY
Status: DISCONTINUED | OUTPATIENT
Start: 2019-05-23 | End: 2019-05-24

## 2019-05-22 RX ORDER — ONDANSETRON 4 MG/1
4 TABLET, ORALLY DISINTEGRATING ORAL EVERY 6 HOURS PRN
Status: DISCONTINUED | OUTPATIENT
Start: 2019-05-22 | End: 2019-06-05 | Stop reason: HOSPADM

## 2019-05-22 RX ORDER — GABAPENTIN 300 MG/1
600 CAPSULE ORAL EVERY 12 HOURS SCHEDULED
Status: DISCONTINUED | OUTPATIENT
Start: 2019-05-22 | End: 2019-06-05 | Stop reason: HOSPADM

## 2019-05-22 RX ORDER — PANTOPRAZOLE SODIUM 40 MG/1
40 TABLET, DELAYED RELEASE ORAL EVERY MORNING
Status: DISCONTINUED | OUTPATIENT
Start: 2019-05-23 | End: 2019-06-05 | Stop reason: HOSPADM

## 2019-05-22 RX ORDER — ONDANSETRON 2 MG/ML
4 INJECTION INTRAMUSCULAR; INTRAVENOUS EVERY 6 HOURS PRN
Status: DISCONTINUED | OUTPATIENT
Start: 2019-05-22 | End: 2019-06-05 | Stop reason: HOSPADM

## 2019-05-22 RX ORDER — ALUMINA, MAGNESIA, AND SIMETHICONE 2400; 2400; 240 MG/30ML; MG/30ML; MG/30ML
15 SUSPENSION ORAL EVERY 6 HOURS PRN
Status: DISCONTINUED | OUTPATIENT
Start: 2019-05-22 | End: 2019-06-03

## 2019-05-22 RX ORDER — SODIUM CHLORIDE 0.9 % (FLUSH) 0.9 %
3-10 SYRINGE (ML) INJECTION AS NEEDED
Status: DISCONTINUED | OUTPATIENT
Start: 2019-05-22 | End: 2019-06-05 | Stop reason: HOSPADM

## 2019-05-22 RX ORDER — AMITRIPTYLINE HYDROCHLORIDE 25 MG/1
75 TABLET, FILM COATED ORAL NIGHTLY
Status: DISCONTINUED | OUTPATIENT
Start: 2019-05-22 | End: 2019-06-05 | Stop reason: HOSPADM

## 2019-05-22 RX ORDER — BUDESONIDE 0.5 MG/2ML
0.5 INHALANT ORAL
Status: DISCONTINUED | OUTPATIENT
Start: 2019-05-22 | End: 2019-05-23

## 2019-05-22 RX ORDER — METOPROLOL TARTRATE 50 MG/1
50 TABLET, FILM COATED ORAL 2 TIMES DAILY
Status: DISCONTINUED | OUTPATIENT
Start: 2019-05-22 | End: 2019-06-05 | Stop reason: HOSPADM

## 2019-05-22 RX ORDER — AMLODIPINE BESYLATE 5 MG/1
2.5 TABLET ORAL DAILY
Status: DISCONTINUED | OUTPATIENT
Start: 2019-05-23 | End: 2019-06-05 | Stop reason: HOSPADM

## 2019-05-22 RX ORDER — CETIRIZINE HYDROCHLORIDE 10 MG/1
10 TABLET ORAL NIGHTLY
Status: DISCONTINUED | OUTPATIENT
Start: 2019-05-22 | End: 2019-06-05 | Stop reason: HOSPADM

## 2019-05-22 RX ORDER — DEXTROSE MONOHYDRATE 25 G/50ML
25 INJECTION, SOLUTION INTRAVENOUS
Status: DISCONTINUED | OUTPATIENT
Start: 2019-05-22 | End: 2019-06-05 | Stop reason: HOSPADM

## 2019-05-22 RX ORDER — ACETAMINOPHEN 325 MG/1
650 TABLET ORAL EVERY 4 HOURS PRN
Status: DISCONTINUED | OUTPATIENT
Start: 2019-05-22 | End: 2019-06-05 | Stop reason: HOSPADM

## 2019-05-22 RX ORDER — ASPIRIN 325 MG
325 TABLET ORAL DAILY
Status: DISCONTINUED | OUTPATIENT
Start: 2019-05-22 | End: 2019-05-22

## 2019-05-22 RX ORDER — IPRATROPIUM BROMIDE AND ALBUTEROL SULFATE 2.5; .5 MG/3ML; MG/3ML
3 SOLUTION RESPIRATORY (INHALATION) 4 TIMES DAILY PRN
Status: DISCONTINUED | OUTPATIENT
Start: 2019-05-22 | End: 2019-06-05 | Stop reason: HOSPADM

## 2019-05-22 RX ORDER — FUROSEMIDE 40 MG/1
40 TABLET ORAL DAILY
Status: DISCONTINUED | OUTPATIENT
Start: 2019-05-22 | End: 2019-05-22

## 2019-05-22 RX ORDER — NICOTINE POLACRILEX 4 MG
1 LOZENGE BUCCAL
Status: DISCONTINUED | OUTPATIENT
Start: 2019-05-22 | End: 2019-06-05 | Stop reason: HOSPADM

## 2019-05-22 RX ORDER — ASCORBIC ACID 500 MG
500 TABLET ORAL DAILY
Status: DISCONTINUED | OUTPATIENT
Start: 2019-05-23 | End: 2019-06-05 | Stop reason: HOSPADM

## 2019-05-22 RX ORDER — IPRATROPIUM BROMIDE AND ALBUTEROL SULFATE 2.5; .5 MG/3ML; MG/3ML
3 SOLUTION RESPIRATORY (INHALATION)
Status: DISCONTINUED | OUTPATIENT
Start: 2019-05-22 | End: 2019-06-05 | Stop reason: HOSPADM

## 2019-05-22 RX ORDER — METHYLPREDNISOLONE SODIUM SUCCINATE 40 MG/ML
40 INJECTION, POWDER, LYOPHILIZED, FOR SOLUTION INTRAMUSCULAR; INTRAVENOUS EVERY 8 HOURS
Status: DISPENSED | OUTPATIENT
Start: 2019-05-22 | End: 2019-05-25

## 2019-05-22 RX ORDER — RANOLAZINE 500 MG/1
500 TABLET, EXTENDED RELEASE ORAL 2 TIMES DAILY
Status: DISCONTINUED | OUTPATIENT
Start: 2019-05-22 | End: 2019-06-05 | Stop reason: HOSPADM

## 2019-05-22 RX ORDER — ASCORBIC ACID 500 MG
500 TABLET ORAL DAILY
Status: DISCONTINUED | OUTPATIENT
Start: 2019-05-22 | End: 2019-05-22

## 2019-05-22 RX ORDER — LISINOPRIL 20 MG/1
20 TABLET ORAL DAILY
Status: DISCONTINUED | OUTPATIENT
Start: 2019-05-22 | End: 2019-05-22

## 2019-05-22 RX ORDER — ATORVASTATIN CALCIUM 80 MG/1
80 TABLET, FILM COATED ORAL NIGHTLY
Status: DISCONTINUED | OUTPATIENT
Start: 2019-05-22 | End: 2019-06-05 | Stop reason: HOSPADM

## 2019-05-22 RX ORDER — LISINOPRIL 20 MG/1
20 TABLET ORAL DAILY
Status: DISCONTINUED | OUTPATIENT
Start: 2019-05-23 | End: 2019-06-05 | Stop reason: HOSPADM

## 2019-05-22 RX ORDER — AMLODIPINE BESYLATE 5 MG/1
2.5 TABLET ORAL DAILY
Status: DISCONTINUED | OUTPATIENT
Start: 2019-05-22 | End: 2019-05-22 | Stop reason: SDUPTHER

## 2019-05-22 RX ORDER — ONDANSETRON 4 MG/1
4 TABLET, FILM COATED ORAL EVERY 6 HOURS PRN
Status: DISCONTINUED | OUTPATIENT
Start: 2019-05-22 | End: 2019-06-05 | Stop reason: HOSPADM

## 2019-05-22 RX ORDER — ASPIRIN 325 MG
325 TABLET ORAL DAILY
Status: DISCONTINUED | OUTPATIENT
Start: 2019-05-23 | End: 2019-06-05 | Stop reason: HOSPADM

## 2019-05-22 RX ORDER — MECLIZINE HCL 12.5 MG/1
25 TABLET ORAL 3 TIMES DAILY PRN
Status: DISCONTINUED | OUTPATIENT
Start: 2019-05-22 | End: 2019-06-05 | Stop reason: HOSPADM

## 2019-05-22 RX ORDER — POTASSIUM CHLORIDE 20 MEQ/1
20 TABLET, EXTENDED RELEASE ORAL DAILY
Status: DISCONTINUED | OUTPATIENT
Start: 2019-05-23 | End: 2019-05-22

## 2019-05-22 RX ORDER — HYDROCODONE BITARTRATE AND ACETAMINOPHEN 5; 325 MG/1; MG/1
1 TABLET ORAL EVERY 6 HOURS PRN
Status: DISCONTINUED | OUTPATIENT
Start: 2019-05-22 | End: 2019-06-05 | Stop reason: HOSPADM

## 2019-05-22 RX ORDER — MONTELUKAST SODIUM 10 MG/1
10 TABLET ORAL NIGHTLY
Status: DISCONTINUED | OUTPATIENT
Start: 2019-05-22 | End: 2019-06-05 | Stop reason: HOSPADM

## 2019-05-22 RX ORDER — AZELASTINE 1 MG/ML
1 SPRAY, METERED NASAL 2 TIMES DAILY
Status: DISCONTINUED | OUTPATIENT
Start: 2019-05-22 | End: 2019-06-05 | Stop reason: HOSPADM

## 2019-05-22 RX ADMIN — CETIRIZINE HYDROCHLORIDE 10 MG: 10 TABLET, FILM COATED ORAL at 22:13

## 2019-05-22 RX ADMIN — ATORVASTATIN CALCIUM 80 MG: 80 TABLET, FILM COATED ORAL at 22:11

## 2019-05-22 RX ADMIN — METHYLPREDNISOLONE SODIUM SUCCINATE 40 MG: 40 INJECTION, POWDER, FOR SOLUTION INTRAMUSCULAR; INTRAVENOUS at 17:00

## 2019-05-22 RX ADMIN — METOPROLOL TARTRATE 50 MG: 50 TABLET ORAL at 22:13

## 2019-05-22 RX ADMIN — IPRATROPIUM BROMIDE AND ALBUTEROL SULFATE 3 ML: .5; 3 SOLUTION RESPIRATORY (INHALATION) at 16:34

## 2019-05-22 RX ADMIN — MONTELUKAST 10 MG: 10 TABLET, FILM COATED ORAL at 22:11

## 2019-05-22 RX ADMIN — INSULIN ASPART 8 UNITS: 100 INJECTION, SOLUTION INTRAVENOUS; SUBCUTANEOUS at 17:01

## 2019-05-22 RX ADMIN — INSULIN ASPART 50 UNITS: 100 INJECTION, SOLUTION INTRAVENOUS; SUBCUTANEOUS at 17:01

## 2019-05-22 RX ADMIN — ENOXAPARIN SODIUM 40 MG: 40 INJECTION SUBCUTANEOUS at 17:00

## 2019-05-22 RX ADMIN — SODIUM CHLORIDE, PRESERVATIVE FREE 3 ML: 5 INJECTION INTRAVENOUS at 22:19

## 2019-05-22 RX ADMIN — BUDESONIDE 0.5 MG: 0.5 INHALANT RESPIRATORY (INHALATION) at 19:22

## 2019-05-22 RX ADMIN — INSULIN ASPART 8 UNITS: 100 INJECTION, SOLUTION INTRAVENOUS; SUBCUTANEOUS at 22:18

## 2019-05-22 RX ADMIN — AZELASTINE HYDROCHLORIDE 1 SPRAY: 137 SPRAY, METERED NASAL at 22:10

## 2019-05-22 RX ADMIN — RANOLAZINE 500 MG: 500 TABLET, FILM COATED, EXTENDED RELEASE ORAL at 22:13

## 2019-05-22 RX ADMIN — GABAPENTIN 600 MG: 300 CAPSULE ORAL at 22:11

## 2019-05-22 RX ADMIN — AMITRIPTYLINE HYDROCHLORIDE 75 MG: 25 TABLET, FILM COATED ORAL at 22:12

## 2019-05-22 NOTE — TELEPHONE ENCOUNTER
I received a call from Danelle the patient is being direct admitted and that his room # is 305 and he is to report to registration.     11:54 AM called patient and relayed the message to him, he was in understanding.

## 2019-05-23 LAB
GLUCOSE BLDC GLUCOMTR-MCNC: 335 MG/DL (ref 70–130)
GLUCOSE BLDC GLUCOMTR-MCNC: 376 MG/DL (ref 70–130)
GLUCOSE BLDC GLUCOMTR-MCNC: 427 MG/DL (ref 70–130)
GLUCOSE BLDC GLUCOMTR-MCNC: 441 MG/DL (ref 70–130)
HBA1C MFR BLD: 10.7 % (ref 3–6)

## 2019-05-23 PROCEDURE — 99232 SBSQ HOSP IP/OBS MODERATE 35: CPT | Performed by: INTERNAL MEDICINE

## 2019-05-23 PROCEDURE — 82962 GLUCOSE BLOOD TEST: CPT

## 2019-05-23 PROCEDURE — 97161 PT EVAL LOW COMPLEX 20 MIN: CPT

## 2019-05-23 PROCEDURE — 63710000001 INSULIN ASPART PER 5 UNITS: Performed by: INTERNAL MEDICINE

## 2019-05-23 PROCEDURE — 94799 UNLISTED PULMONARY SVC/PX: CPT

## 2019-05-23 PROCEDURE — 99214 OFFICE O/P EST MOD 30 MIN: CPT | Performed by: INTERNAL MEDICINE

## 2019-05-23 PROCEDURE — 25010000002 ENOXAPARIN PER 10 MG: Performed by: INTERNAL MEDICINE

## 2019-05-23 PROCEDURE — G0378 HOSPITAL OBSERVATION PER HR: HCPCS

## 2019-05-23 PROCEDURE — 25010000002 METHYLPREDNISOLONE PER 40 MG: Performed by: INTERNAL MEDICINE

## 2019-05-23 RX ORDER — BUDESONIDE 0.5 MG/2ML
1 INHALANT ORAL
Status: DISCONTINUED | OUTPATIENT
Start: 2019-05-23 | End: 2019-05-27

## 2019-05-23 RX ORDER — FLUTICASONE PROPIONATE 50 MCG
1 SPRAY, SUSPENSION (ML) NASAL 2 TIMES DAILY
Status: DISCONTINUED | OUTPATIENT
Start: 2019-05-23 | End: 2019-06-05 | Stop reason: HOSPADM

## 2019-05-23 RX ADMIN — ASPIRIN 325 MG ORAL TABLET 325 MG: 325 PILL ORAL at 08:51

## 2019-05-23 RX ADMIN — FLUTICASONE PROPIONATE 1 SPRAY: 50 SPRAY, METERED NASAL at 23:10

## 2019-05-23 RX ADMIN — ATORVASTATIN CALCIUM 80 MG: 80 TABLET, FILM COATED ORAL at 22:13

## 2019-05-23 RX ADMIN — IPRATROPIUM BROMIDE AND ALBUTEROL SULFATE 3 ML: .5; 3 SOLUTION RESPIRATORY (INHALATION) at 07:04

## 2019-05-23 RX ADMIN — BUDESONIDE 1 MG: 0.5 INHALANT RESPIRATORY (INHALATION) at 20:11

## 2019-05-23 RX ADMIN — INSULIN ASPART 14 UNITS: 100 INJECTION, SOLUTION INTRAVENOUS; SUBCUTANEOUS at 12:12

## 2019-05-23 RX ADMIN — CETIRIZINE HYDROCHLORIDE 10 MG: 10 TABLET, FILM COATED ORAL at 22:14

## 2019-05-23 RX ADMIN — AMITRIPTYLINE HYDROCHLORIDE 75 MG: 25 TABLET, FILM COATED ORAL at 22:13

## 2019-05-23 RX ADMIN — RANOLAZINE 500 MG: 500 TABLET, FILM COATED, EXTENDED RELEASE ORAL at 08:51

## 2019-05-23 RX ADMIN — SODIUM CHLORIDE, PRESERVATIVE FREE 3 ML: 5 INJECTION INTRAVENOUS at 08:53

## 2019-05-23 RX ADMIN — INSULIN ASPART 50 UNITS: 100 INJECTION, SOLUTION INTRAVENOUS; SUBCUTANEOUS at 17:08

## 2019-05-23 RX ADMIN — IPRATROPIUM BROMIDE AND ALBUTEROL SULFATE 3 ML: .5; 3 SOLUTION RESPIRATORY (INHALATION) at 12:57

## 2019-05-23 RX ADMIN — METHYLPREDNISOLONE SODIUM SUCCINATE 40 MG: 40 INJECTION, POWDER, FOR SOLUTION INTRAMUSCULAR; INTRAVENOUS at 08:50

## 2019-05-23 RX ADMIN — OXYCODONE HYDROCHLORIDE AND ACETAMINOPHEN 500 MG: 500 TABLET ORAL at 08:51

## 2019-05-23 RX ADMIN — ENOXAPARIN SODIUM 40 MG: 40 INJECTION SUBCUTANEOUS at 17:08

## 2019-05-23 RX ADMIN — AZELASTINE HYDROCHLORIDE 1 SPRAY: 137 SPRAY, METERED NASAL at 09:04

## 2019-05-23 RX ADMIN — METOPROLOL TARTRATE 50 MG: 50 TABLET ORAL at 08:51

## 2019-05-23 RX ADMIN — MONTELUKAST 10 MG: 10 TABLET, FILM COATED ORAL at 22:13

## 2019-05-23 RX ADMIN — PANTOPRAZOLE SODIUM 40 MG: 40 TABLET, DELAYED RELEASE ORAL at 06:53

## 2019-05-23 RX ADMIN — AZELASTINE HYDROCHLORIDE 1 SPRAY: 137 SPRAY, METERED NASAL at 22:12

## 2019-05-23 RX ADMIN — INSULIN ASPART 12 UNITS: 100 INJECTION, SOLUTION INTRAVENOUS; SUBCUTANEOUS at 22:14

## 2019-05-23 RX ADMIN — LISINOPRIL 20 MG: 20 TABLET ORAL at 08:51

## 2019-05-23 RX ADMIN — SODIUM CHLORIDE, PRESERVATIVE FREE 3 ML: 5 INJECTION INTRAVENOUS at 23:10

## 2019-05-23 RX ADMIN — METOPROLOL TARTRATE 50 MG: 50 TABLET ORAL at 22:13

## 2019-05-23 RX ADMIN — IPRATROPIUM BROMIDE AND ALBUTEROL SULFATE 3 ML: .5; 3 SOLUTION RESPIRATORY (INHALATION) at 00:43

## 2019-05-23 RX ADMIN — AMLODIPINE BESYLATE 2.5 MG: 5 TABLET ORAL at 08:51

## 2019-05-23 RX ADMIN — INSULIN ASPART 10 UNITS: 100 INJECTION, SOLUTION INTRAVENOUS; SUBCUTANEOUS at 06:53

## 2019-05-23 RX ADMIN — METHYLPREDNISOLONE SODIUM SUCCINATE 40 MG: 40 INJECTION, POWDER, FOR SOLUTION INTRAMUSCULAR; INTRAVENOUS at 00:54

## 2019-05-23 RX ADMIN — RANOLAZINE 500 MG: 500 TABLET, FILM COATED, EXTENDED RELEASE ORAL at 22:13

## 2019-05-23 RX ADMIN — INSULIN ASPART 50 UNITS: 100 INJECTION, SOLUTION INTRAVENOUS; SUBCUTANEOUS at 08:52

## 2019-05-23 RX ADMIN — IPRATROPIUM BROMIDE AND ALBUTEROL SULFATE 3 ML: .5; 3 SOLUTION RESPIRATORY (INHALATION) at 20:11

## 2019-05-23 RX ADMIN — FUROSEMIDE 40 MG: 40 TABLET ORAL at 08:51

## 2019-05-23 RX ADMIN — INSULIN ASPART 50 UNITS: 100 INJECTION, SOLUTION INTRAVENOUS; SUBCUTANEOUS at 12:12

## 2019-05-23 RX ADMIN — GABAPENTIN 600 MG: 300 CAPSULE ORAL at 08:51

## 2019-05-23 RX ADMIN — MELATONIN TAB 5 MG 5 MG: 5 TAB at 00:54

## 2019-05-23 RX ADMIN — GABAPENTIN 600 MG: 300 CAPSULE ORAL at 22:13

## 2019-05-23 RX ADMIN — BUDESONIDE 0.5 MG: 0.5 INHALANT RESPIRATORY (INHALATION) at 07:04

## 2019-05-24 PROBLEM — J45.909 ASTHMA: Status: ACTIVE | Noted: 2019-05-24

## 2019-05-24 LAB
GLUCOSE BLDC GLUCOMTR-MCNC: 301 MG/DL (ref 70–130)
GLUCOSE BLDC GLUCOMTR-MCNC: 372 MG/DL (ref 70–130)
GLUCOSE BLDC GLUCOMTR-MCNC: 401 MG/DL (ref 70–130)
GLUCOSE BLDC GLUCOMTR-MCNC: 420 MG/DL (ref 70–130)

## 2019-05-24 PROCEDURE — 94799 UNLISTED PULMONARY SVC/PX: CPT

## 2019-05-24 PROCEDURE — 25010000002 ENOXAPARIN PER 10 MG: Performed by: INTERNAL MEDICINE

## 2019-05-24 PROCEDURE — 63710000001 INSULIN ASPART PER 5 UNITS: Performed by: INTERNAL MEDICINE

## 2019-05-24 PROCEDURE — 82962 GLUCOSE BLOOD TEST: CPT

## 2019-05-24 PROCEDURE — 25010000002 FUROSEMIDE PER 20 MG: Performed by: INTERNAL MEDICINE

## 2019-05-24 PROCEDURE — 25010000002 MAGNESIUM SULFATE IN D5W 1G/100ML (PREMIX) 1-5 GM/100ML-% SOLUTION: Performed by: INTERNAL MEDICINE

## 2019-05-24 PROCEDURE — 25010000002 METHYLPREDNISOLONE PER 40 MG: Performed by: INTERNAL MEDICINE

## 2019-05-24 PROCEDURE — 99232 SBSQ HOSP IP/OBS MODERATE 35: CPT | Performed by: INTERNAL MEDICINE

## 2019-05-24 PROCEDURE — 25010000002 METHYLPREDNISOLONE PER 80 MG: Performed by: INTERNAL MEDICINE

## 2019-05-24 PROCEDURE — 99233 SBSQ HOSP IP/OBS HIGH 50: CPT | Performed by: INTERNAL MEDICINE

## 2019-05-24 RX ORDER — MAGNESIUM SULFATE 1 G/100ML
1 INJECTION INTRAVENOUS ONCE
Status: COMPLETED | OUTPATIENT
Start: 2019-05-24 | End: 2019-05-24

## 2019-05-24 RX ORDER — FUROSEMIDE 10 MG/ML
40 INJECTION INTRAMUSCULAR; INTRAVENOUS ONCE
Status: COMPLETED | OUTPATIENT
Start: 2019-05-24 | End: 2019-05-24

## 2019-05-24 RX ORDER — METHYLPREDNISOLONE ACETATE 80 MG/ML
80 INJECTION, SUSPENSION INTRA-ARTICULAR; INTRALESIONAL; INTRAMUSCULAR; SOFT TISSUE ONCE
Status: COMPLETED | OUTPATIENT
Start: 2019-05-24 | End: 2019-05-24

## 2019-05-24 RX ADMIN — SODIUM CHLORIDE, PRESERVATIVE FREE 3 ML: 5 INJECTION INTRAVENOUS at 20:40

## 2019-05-24 RX ADMIN — METOPROLOL TARTRATE 50 MG: 50 TABLET ORAL at 09:18

## 2019-05-24 RX ADMIN — MELATONIN TAB 5 MG 5 MG: 5 TAB at 00:22

## 2019-05-24 RX ADMIN — IPRATROPIUM BROMIDE AND ALBUTEROL SULFATE 3 ML: .5; 3 SOLUTION RESPIRATORY (INHALATION) at 13:15

## 2019-05-24 RX ADMIN — FUROSEMIDE 40 MG: 40 TABLET ORAL at 09:18

## 2019-05-24 RX ADMIN — INSULIN ASPART 50 UNITS: 100 INJECTION, SOLUTION INTRAVENOUS; SUBCUTANEOUS at 17:46

## 2019-05-24 RX ADMIN — INSULIN ASPART 24 UNITS: 100 INJECTION, SOLUTION INTRAVENOUS; SUBCUTANEOUS at 20:37

## 2019-05-24 RX ADMIN — METHYLPREDNISOLONE SODIUM SUCCINATE 40 MG: 40 INJECTION, POWDER, FOR SOLUTION INTRAMUSCULAR; INTRAVENOUS at 16:09

## 2019-05-24 RX ADMIN — MAGNESIUM SULFATE HEPTAHYDRATE 1 G: 1 INJECTION, SOLUTION INTRAVENOUS at 13:02

## 2019-05-24 RX ADMIN — IPRATROPIUM BROMIDE AND ALBUTEROL SULFATE 3 ML: .5; 3 SOLUTION RESPIRATORY (INHALATION) at 19:53

## 2019-05-24 RX ADMIN — AMITRIPTYLINE HYDROCHLORIDE 75 MG: 25 TABLET, FILM COATED ORAL at 20:38

## 2019-05-24 RX ADMIN — INSULIN ASPART 24 UNITS: 100 INJECTION, SOLUTION INTRAVENOUS; SUBCUTANEOUS at 17:45

## 2019-05-24 RX ADMIN — METOPROLOL TARTRATE 50 MG: 50 TABLET ORAL at 20:38

## 2019-05-24 RX ADMIN — METHYLPREDNISOLONE SODIUM SUCCINATE 40 MG: 40 INJECTION, POWDER, FOR SOLUTION INTRAMUSCULAR; INTRAVENOUS at 09:24

## 2019-05-24 RX ADMIN — BUDESONIDE 1 MG: 0.5 INHALANT RESPIRATORY (INHALATION) at 07:12

## 2019-05-24 RX ADMIN — SODIUM CHLORIDE, PRESERVATIVE FREE 10 ML: 5 INJECTION INTRAVENOUS at 11:56

## 2019-05-24 RX ADMIN — PANTOPRAZOLE SODIUM 40 MG: 40 TABLET, DELAYED RELEASE ORAL at 07:33

## 2019-05-24 RX ADMIN — LISINOPRIL 20 MG: 20 TABLET ORAL at 09:17

## 2019-05-24 RX ADMIN — SODIUM CHLORIDE, PRESERVATIVE FREE 3 ML: 5 INJECTION INTRAVENOUS at 09:24

## 2019-05-24 RX ADMIN — AZELASTINE HYDROCHLORIDE 1 SPRAY: 137 SPRAY, METERED NASAL at 20:40

## 2019-05-24 RX ADMIN — MONTELUKAST 10 MG: 10 TABLET, FILM COATED ORAL at 20:38

## 2019-05-24 RX ADMIN — SODIUM CHLORIDE, PRESERVATIVE FREE 10 ML: 5 INJECTION INTRAVENOUS at 16:10

## 2019-05-24 RX ADMIN — METHYLPREDNISOLONE SODIUM SUCCINATE 40 MG: 40 INJECTION, POWDER, FOR SOLUTION INTRAMUSCULAR; INTRAVENOUS at 00:22

## 2019-05-24 RX ADMIN — IPRATROPIUM BROMIDE AND ALBUTEROL SULFATE 3 ML: .5; 3 SOLUTION RESPIRATORY (INHALATION) at 01:36

## 2019-05-24 RX ADMIN — IPRATROPIUM BROMIDE AND ALBUTEROL SULFATE 3 ML: .5; 3 SOLUTION RESPIRATORY (INHALATION) at 07:11

## 2019-05-24 RX ADMIN — GABAPENTIN 600 MG: 300 CAPSULE ORAL at 09:18

## 2019-05-24 RX ADMIN — INSULIN ASPART 50 UNITS: 100 INJECTION, SOLUTION INTRAVENOUS; SUBCUTANEOUS at 09:23

## 2019-05-24 RX ADMIN — ASPIRIN 325 MG ORAL TABLET 325 MG: 325 PILL ORAL at 09:18

## 2019-05-24 RX ADMIN — GABAPENTIN 600 MG: 300 CAPSULE ORAL at 20:38

## 2019-05-24 RX ADMIN — ATORVASTATIN CALCIUM 80 MG: 80 TABLET, FILM COATED ORAL at 20:38

## 2019-05-24 RX ADMIN — METHYLPREDNISOLONE ACETATE 80 MG: 80 INJECTION, SUSPENSION INTRA-ARTICULAR; INTRALESIONAL; INTRAMUSCULAR; SOFT TISSUE at 11:53

## 2019-05-24 RX ADMIN — BUDESONIDE 1 MG: 0.5 INHALANT RESPIRATORY (INHALATION) at 19:53

## 2019-05-24 RX ADMIN — FLUTICASONE PROPIONATE 1 SPRAY: 50 SPRAY, METERED NASAL at 20:40

## 2019-05-24 RX ADMIN — AZELASTINE HYDROCHLORIDE 1 SPRAY: 137 SPRAY, METERED NASAL at 09:16

## 2019-05-24 RX ADMIN — CETIRIZINE HYDROCHLORIDE 10 MG: 10 TABLET, FILM COATED ORAL at 20:38

## 2019-05-24 RX ADMIN — FUROSEMIDE 40 MG: 10 INJECTION, SOLUTION INTRAMUSCULAR; INTRAVENOUS at 11:55

## 2019-05-24 RX ADMIN — INSULIN ASPART 50 UNITS: 100 INJECTION, SOLUTION INTRAVENOUS; SUBCUTANEOUS at 11:59

## 2019-05-24 RX ADMIN — RANOLAZINE 500 MG: 500 TABLET, FILM COATED, EXTENDED RELEASE ORAL at 20:37

## 2019-05-24 RX ADMIN — INSULIN ASPART 12 UNITS: 100 INJECTION, SOLUTION INTRAVENOUS; SUBCUTANEOUS at 11:58

## 2019-05-24 RX ADMIN — ENOXAPARIN SODIUM 40 MG: 40 INJECTION SUBCUTANEOUS at 17:46

## 2019-05-24 RX ADMIN — AMLODIPINE BESYLATE 2.5 MG: 5 TABLET ORAL at 09:18

## 2019-05-24 RX ADMIN — OXYCODONE HYDROCHLORIDE AND ACETAMINOPHEN 500 MG: 500 TABLET ORAL at 09:18

## 2019-05-24 RX ADMIN — INSULIN ASPART 10 UNITS: 100 INJECTION, SOLUTION INTRAVENOUS; SUBCUTANEOUS at 07:33

## 2019-05-24 RX ADMIN — RANOLAZINE 500 MG: 500 TABLET, FILM COATED, EXTENDED RELEASE ORAL at 09:18

## 2019-05-25 LAB
ANION GAP SERPL CALCULATED.3IONS-SCNC: 15.8 MMOL/L (ref 10–20)
BUN BLD-MCNC: 36 MG/DL (ref 7–20)
BUN/CREAT SERPL: 51.4 (ref 6.3–21.9)
CALCIUM SPEC-SCNC: 8.8 MG/DL (ref 8.4–10.2)
CHLORIDE SERPL-SCNC: 95 MMOL/L (ref 98–107)
CO2 SERPL-SCNC: 29 MMOL/L (ref 26–30)
CREAT BLD-MCNC: 0.7 MG/DL (ref 0.6–1.3)
DEPRECATED RDW RBC AUTO: 39.6 FL (ref 37–54)
ERYTHROCYTE [DISTWIDTH] IN BLOOD BY AUTOMATED COUNT: 14 % (ref 12.3–15.4)
GFR SERPL CREATININE-BSD FRML MDRD: 116 ML/MIN/1.73
GLUCOSE BLD-MCNC: 333 MG/DL (ref 74–98)
GLUCOSE BLDC GLUCOMTR-MCNC: 301 MG/DL (ref 70–130)
GLUCOSE BLDC GLUCOMTR-MCNC: 324 MG/DL (ref 70–130)
GLUCOSE BLDC GLUCOMTR-MCNC: 326 MG/DL (ref 70–130)
GLUCOSE BLDC GLUCOMTR-MCNC: 368 MG/DL (ref 70–130)
HCT VFR BLD AUTO: 40.1 % (ref 37.5–51)
HGB BLD-MCNC: 13.6 G/DL (ref 13–17.7)
MCH RBC QN AUTO: 26.9 PG (ref 26.6–33)
MCHC RBC AUTO-ENTMCNC: 33.9 G/DL (ref 31.5–35.7)
MCV RBC AUTO: 79.4 FL (ref 79–97)
PLATELET # BLD AUTO: 162 10*3/MM3 (ref 140–450)
PMV BLD AUTO: 10.9 FL (ref 6–12)
POTASSIUM BLD-SCNC: 4.8 MMOL/L (ref 3.5–5.1)
RBC # BLD AUTO: 5.05 10*6/MM3 (ref 4.14–5.8)
SODIUM BLD-SCNC: 135 MMOL/L (ref 137–145)
WBC NRBC COR # BLD: 13.42 10*3/MM3 (ref 3.4–10.8)

## 2019-05-25 PROCEDURE — 80048 BASIC METABOLIC PNL TOTAL CA: CPT | Performed by: INTERNAL MEDICINE

## 2019-05-25 PROCEDURE — 94799 UNLISTED PULMONARY SVC/PX: CPT

## 2019-05-25 PROCEDURE — 82962 GLUCOSE BLOOD TEST: CPT

## 2019-05-25 PROCEDURE — 25010000002 METHYLPREDNISOLONE PER 125 MG: Performed by: INTERNAL MEDICINE

## 2019-05-25 PROCEDURE — 63710000001 INSULIN ASPART PER 5 UNITS: Performed by: INTERNAL MEDICINE

## 2019-05-25 PROCEDURE — 25010000002 ENOXAPARIN PER 10 MG: Performed by: INTERNAL MEDICINE

## 2019-05-25 PROCEDURE — 85027 COMPLETE CBC AUTOMATED: CPT | Performed by: INTERNAL MEDICINE

## 2019-05-25 PROCEDURE — 99232 SBSQ HOSP IP/OBS MODERATE 35: CPT | Performed by: INTERNAL MEDICINE

## 2019-05-25 PROCEDURE — 25010000002 METHYLPREDNISOLONE PER 40 MG: Performed by: INTERNAL MEDICINE

## 2019-05-25 RX ORDER — GUAIFENESIN AND DEXTROMETHORPHAN HYDROBROMIDE 600; 30 MG/1; MG/1
1 TABLET, EXTENDED RELEASE ORAL EVERY 12 HOURS SCHEDULED
Status: DISCONTINUED | OUTPATIENT
Start: 2019-05-25 | End: 2019-06-05 | Stop reason: HOSPADM

## 2019-05-25 RX ORDER — ACETYLCYSTEINE 200 MG/ML
3 SOLUTION ORAL; RESPIRATORY (INHALATION)
Status: DISCONTINUED | OUTPATIENT
Start: 2019-05-25 | End: 2019-05-27

## 2019-05-25 RX ORDER — METHYLPREDNISOLONE SODIUM SUCCINATE 125 MG/2ML
60 INJECTION, POWDER, LYOPHILIZED, FOR SOLUTION INTRAMUSCULAR; INTRAVENOUS EVERY 12 HOURS
Status: DISCONTINUED | OUTPATIENT
Start: 2019-05-25 | End: 2019-05-26

## 2019-05-25 RX ADMIN — AMLODIPINE BESYLATE 2.5 MG: 5 TABLET ORAL at 09:05

## 2019-05-25 RX ADMIN — BUDESONIDE 1 MG: 0.5 INHALANT RESPIRATORY (INHALATION) at 18:51

## 2019-05-25 RX ADMIN — METOPROLOL TARTRATE 50 MG: 50 TABLET ORAL at 09:04

## 2019-05-25 RX ADMIN — IPRATROPIUM BROMIDE AND ALBUTEROL SULFATE 3 ML: .5; 3 SOLUTION RESPIRATORY (INHALATION) at 01:11

## 2019-05-25 RX ADMIN — MONTELUKAST 10 MG: 10 TABLET, FILM COATED ORAL at 22:00

## 2019-05-25 RX ADMIN — ASPIRIN 325 MG ORAL TABLET 325 MG: 325 PILL ORAL at 09:05

## 2019-05-25 RX ADMIN — IPRATROPIUM BROMIDE AND ALBUTEROL SULFATE 3 ML: .5; 3 SOLUTION RESPIRATORY (INHALATION) at 07:39

## 2019-05-25 RX ADMIN — ACETYLCYSTEINE 3 ML: 200 SOLUTION ORAL; RESPIRATORY (INHALATION) at 12:33

## 2019-05-25 RX ADMIN — INSULIN ASPART 20 UNITS: 100 INJECTION, SOLUTION INTRAVENOUS; SUBCUTANEOUS at 12:08

## 2019-05-25 RX ADMIN — ENOXAPARIN SODIUM 40 MG: 40 INJECTION SUBCUTANEOUS at 06:53

## 2019-05-25 RX ADMIN — IPRATROPIUM BROMIDE AND ALBUTEROL SULFATE 3 ML: .5; 3 SOLUTION RESPIRATORY (INHALATION) at 18:51

## 2019-05-25 RX ADMIN — AZELASTINE HYDROCHLORIDE 1 SPRAY: 137 SPRAY, METERED NASAL at 09:06

## 2019-05-25 RX ADMIN — INSULIN ASPART 16 UNITS: 100 INJECTION, SOLUTION INTRAVENOUS; SUBCUTANEOUS at 06:54

## 2019-05-25 RX ADMIN — OXYCODONE HYDROCHLORIDE AND ACETAMINOPHEN 500 MG: 500 TABLET ORAL at 09:05

## 2019-05-25 RX ADMIN — SODIUM CHLORIDE, PRESERVATIVE FREE 3 ML: 5 INJECTION INTRAVENOUS at 22:00

## 2019-05-25 RX ADMIN — BUDESONIDE 1 MG: 0.5 INHALANT RESPIRATORY (INHALATION) at 07:39

## 2019-05-25 RX ADMIN — INSULIN ASPART 16 UNITS: 100 INJECTION, SOLUTION INTRAVENOUS; SUBCUTANEOUS at 17:22

## 2019-05-25 RX ADMIN — INSULIN ASPART 50 UNITS: 100 INJECTION, SOLUTION INTRAVENOUS; SUBCUTANEOUS at 12:09

## 2019-05-25 RX ADMIN — CETIRIZINE HYDROCHLORIDE 10 MG: 10 TABLET, FILM COATED ORAL at 22:00

## 2019-05-25 RX ADMIN — INSULIN ASPART 16 UNITS: 100 INJECTION, SOLUTION INTRAVENOUS; SUBCUTANEOUS at 22:00

## 2019-05-25 RX ADMIN — ENOXAPARIN SODIUM 40 MG: 40 INJECTION SUBCUTANEOUS at 17:22

## 2019-05-25 RX ADMIN — GUAIFENESIN AND DEXTROMETHORPHAN HYDROBROMIDE 1 TABLET: 600; 30 TABLET, EXTENDED RELEASE ORAL at 12:08

## 2019-05-25 RX ADMIN — FLUTICASONE PROPIONATE 1 SPRAY: 50 SPRAY, METERED NASAL at 22:00

## 2019-05-25 RX ADMIN — METHYLPREDNISOLONE SODIUM SUCCINATE 60 MG: 125 INJECTION, POWDER, FOR SOLUTION INTRAMUSCULAR; INTRAVENOUS at 12:08

## 2019-05-25 RX ADMIN — IPRATROPIUM BROMIDE AND ALBUTEROL SULFATE 3 ML: .5; 3 SOLUTION RESPIRATORY (INHALATION) at 12:32

## 2019-05-25 RX ADMIN — GUAIFENESIN AND DEXTROMETHORPHAN HYDROBROMIDE 1 TABLET: 600; 30 TABLET, EXTENDED RELEASE ORAL at 22:00

## 2019-05-25 RX ADMIN — ATORVASTATIN CALCIUM 80 MG: 80 TABLET, FILM COATED ORAL at 22:00

## 2019-05-25 RX ADMIN — METHYLPREDNISOLONE SODIUM SUCCINATE 60 MG: 125 INJECTION, POWDER, FOR SOLUTION INTRAMUSCULAR; INTRAVENOUS at 23:32

## 2019-05-25 RX ADMIN — RANOLAZINE 500 MG: 500 TABLET, FILM COATED, EXTENDED RELEASE ORAL at 22:00

## 2019-05-25 RX ADMIN — INSULIN ASPART 50 UNITS: 100 INJECTION, SOLUTION INTRAVENOUS; SUBCUTANEOUS at 17:23

## 2019-05-25 RX ADMIN — METOPROLOL TARTRATE 50 MG: 50 TABLET ORAL at 22:00

## 2019-05-25 RX ADMIN — LISINOPRIL 20 MG: 20 TABLET ORAL at 09:05

## 2019-05-25 RX ADMIN — AZELASTINE HYDROCHLORIDE 1 SPRAY: 137 SPRAY, METERED NASAL at 22:00

## 2019-05-25 RX ADMIN — GABAPENTIN 600 MG: 300 CAPSULE ORAL at 09:05

## 2019-05-25 RX ADMIN — PANTOPRAZOLE SODIUM 40 MG: 40 TABLET, DELAYED RELEASE ORAL at 06:53

## 2019-05-25 RX ADMIN — GABAPENTIN 600 MG: 300 CAPSULE ORAL at 22:00

## 2019-05-25 RX ADMIN — AMITRIPTYLINE HYDROCHLORIDE 75 MG: 25 TABLET, FILM COATED ORAL at 22:00

## 2019-05-25 RX ADMIN — INSULIN ASPART 50 UNITS: 100 INJECTION, SOLUTION INTRAVENOUS; SUBCUTANEOUS at 09:06

## 2019-05-25 RX ADMIN — FUROSEMIDE 60 MG: 40 TABLET ORAL at 09:04

## 2019-05-25 RX ADMIN — RANOLAZINE 500 MG: 500 TABLET, FILM COATED, EXTENDED RELEASE ORAL at 09:05

## 2019-05-25 RX ADMIN — ACETYLCYSTEINE 1 ML: 200 SOLUTION ORAL; RESPIRATORY (INHALATION) at 18:51

## 2019-05-25 RX ADMIN — FLUTICASONE PROPIONATE 1 SPRAY: 50 SPRAY, METERED NASAL at 09:07

## 2019-05-25 RX ADMIN — METHYLPREDNISOLONE SODIUM SUCCINATE 40 MG: 40 INJECTION, POWDER, FOR SOLUTION INTRAMUSCULAR; INTRAVENOUS at 01:15

## 2019-05-26 LAB
ALBUMIN SERPL-MCNC: 3.5 G/DL (ref 3.5–5)
ALBUMIN/GLOB SERPL: 1.3 G/DL (ref 1–2)
ALP SERPL-CCNC: 72 U/L (ref 38–126)
ALT SERPL W P-5'-P-CCNC: 40 U/L (ref 13–69)
ANION GAP SERPL CALCULATED.3IONS-SCNC: 14.7 MMOL/L (ref 10–20)
AST SERPL-CCNC: 24 U/L (ref 15–46)
BASOPHILS # BLD AUTO: 0.03 10*3/MM3 (ref 0–0.2)
BASOPHILS NFR BLD AUTO: 0.2 % (ref 0–1.5)
BILIRUB SERPL-MCNC: 0.7 MG/DL (ref 0.2–1.3)
BUN BLD-MCNC: 38 MG/DL (ref 7–20)
BUN/CREAT SERPL: 63.3 (ref 6.3–21.9)
CALCIUM SPEC-SCNC: 8.3 MG/DL (ref 8.4–10.2)
CHLORIDE SERPL-SCNC: 98 MMOL/L (ref 98–107)
CO2 SERPL-SCNC: 28 MMOL/L (ref 26–30)
CREAT BLD-MCNC: 0.6 MG/DL (ref 0.6–1.3)
DEPRECATED RDW RBC AUTO: 39.8 FL (ref 37–54)
EOSINOPHIL # BLD AUTO: 0 10*3/MM3 (ref 0–0.4)
EOSINOPHIL NFR BLD AUTO: 0 % (ref 0.3–6.2)
ERYTHROCYTE [DISTWIDTH] IN BLOOD BY AUTOMATED COUNT: 14 % (ref 12.3–15.4)
GFR SERPL CREATININE-BSD FRML MDRD: 138 ML/MIN/1.73
GLOBULIN UR ELPH-MCNC: 2.7 GM/DL
GLUCOSE BLD-MCNC: 318 MG/DL (ref 74–98)
GLUCOSE BLDC GLUCOMTR-MCNC: 193 MG/DL (ref 70–130)
GLUCOSE BLDC GLUCOMTR-MCNC: 251 MG/DL (ref 70–130)
GLUCOSE BLDC GLUCOMTR-MCNC: 287 MG/DL (ref 70–130)
GLUCOSE BLDC GLUCOMTR-MCNC: 339 MG/DL (ref 70–130)
HCT VFR BLD AUTO: 41.3 % (ref 37.5–51)
HGB BLD-MCNC: 13.8 G/DL (ref 13–17.7)
IMM GRANULOCYTES # BLD AUTO: 0.27 10*3/MM3 (ref 0–0.05)
IMM GRANULOCYTES NFR BLD AUTO: 2.1 % (ref 0–0.5)
LYMPHOCYTES # BLD AUTO: 0.74 10*3/MM3 (ref 0.7–3.1)
LYMPHOCYTES NFR BLD AUTO: 5.9 % (ref 19.6–45.3)
MCH RBC QN AUTO: 26.6 PG (ref 26.6–33)
MCHC RBC AUTO-ENTMCNC: 33.4 G/DL (ref 31.5–35.7)
MCV RBC AUTO: 79.7 FL (ref 79–97)
MONOCYTES # BLD AUTO: 0.54 10*3/MM3 (ref 0.1–0.9)
MONOCYTES NFR BLD AUTO: 4.3 % (ref 5–12)
NEUTROPHILS # BLD AUTO: 11.06 10*3/MM3 (ref 1.7–7)
NEUTROPHILS NFR BLD AUTO: 87.5 % (ref 42.7–76)
NRBC BLD AUTO-RTO: 0 /100 WBC (ref 0–0.2)
PLATELET # BLD AUTO: 168 10*3/MM3 (ref 140–450)
PMV BLD AUTO: 10.8 FL (ref 6–12)
POTASSIUM BLD-SCNC: 4.7 MMOL/L (ref 3.5–5.1)
PROT SERPL-MCNC: 6.2 G/DL (ref 6.3–8.2)
RBC # BLD AUTO: 5.18 10*6/MM3 (ref 4.14–5.8)
SODIUM BLD-SCNC: 136 MMOL/L (ref 137–145)
WBC NRBC COR # BLD: 12.64 10*3/MM3 (ref 3.4–10.8)

## 2019-05-26 PROCEDURE — 25010000002 METHYLPREDNISOLONE PER 40 MG: Performed by: INTERNAL MEDICINE

## 2019-05-26 PROCEDURE — 94799 UNLISTED PULMONARY SVC/PX: CPT

## 2019-05-26 PROCEDURE — 99232 SBSQ HOSP IP/OBS MODERATE 35: CPT | Performed by: INTERNAL MEDICINE

## 2019-05-26 PROCEDURE — 85025 COMPLETE CBC W/AUTO DIFF WBC: CPT | Performed by: INTERNAL MEDICINE

## 2019-05-26 PROCEDURE — 82962 GLUCOSE BLOOD TEST: CPT

## 2019-05-26 PROCEDURE — 80053 COMPREHEN METABOLIC PANEL: CPT | Performed by: INTERNAL MEDICINE

## 2019-05-26 PROCEDURE — 63710000001 INSULIN ASPART PER 5 UNITS: Performed by: INTERNAL MEDICINE

## 2019-05-26 PROCEDURE — 25010000002 ENOXAPARIN PER 10 MG: Performed by: INTERNAL MEDICINE

## 2019-05-26 RX ORDER — METHYLPREDNISOLONE SODIUM SUCCINATE 40 MG/ML
40 INJECTION, POWDER, LYOPHILIZED, FOR SOLUTION INTRAMUSCULAR; INTRAVENOUS EVERY 12 HOURS
Status: DISCONTINUED | OUTPATIENT
Start: 2019-05-26 | End: 2019-05-27

## 2019-05-26 RX ADMIN — BUDESONIDE 1 MG: 0.5 INHALANT RESPIRATORY (INHALATION) at 20:13

## 2019-05-26 RX ADMIN — IPRATROPIUM BROMIDE AND ALBUTEROL SULFATE 3 ML: .5; 3 SOLUTION RESPIRATORY (INHALATION) at 12:40

## 2019-05-26 RX ADMIN — GUAIFENESIN AND DEXTROMETHORPHAN HYDROBROMIDE 1 TABLET: 600; 30 TABLET, EXTENDED RELEASE ORAL at 08:36

## 2019-05-26 RX ADMIN — CETIRIZINE HYDROCHLORIDE 10 MG: 10 TABLET, FILM COATED ORAL at 20:51

## 2019-05-26 RX ADMIN — ACETYLCYSTEINE 3 ML: 200 SOLUTION ORAL; RESPIRATORY (INHALATION) at 07:27

## 2019-05-26 RX ADMIN — METHYLPREDNISOLONE SODIUM SUCCINATE 40 MG: 40 INJECTION, POWDER, FOR SOLUTION INTRAMUSCULAR; INTRAVENOUS at 11:57

## 2019-05-26 RX ADMIN — AMITRIPTYLINE HYDROCHLORIDE 75 MG: 25 TABLET, FILM COATED ORAL at 20:51

## 2019-05-26 RX ADMIN — ACETYLCYSTEINE 3 ML: 200 SOLUTION ORAL; RESPIRATORY (INHALATION) at 00:25

## 2019-05-26 RX ADMIN — INSULIN ASPART 50 UNITS: 100 INJECTION, SOLUTION INTRAVENOUS; SUBCUTANEOUS at 08:37

## 2019-05-26 RX ADMIN — ENOXAPARIN SODIUM 40 MG: 40 INJECTION SUBCUTANEOUS at 06:49

## 2019-05-26 RX ADMIN — OXYCODONE HYDROCHLORIDE AND ACETAMINOPHEN 500 MG: 500 TABLET ORAL at 08:37

## 2019-05-26 RX ADMIN — ACETYLCYSTEINE 3 ML: 200 SOLUTION ORAL; RESPIRATORY (INHALATION) at 12:40

## 2019-05-26 RX ADMIN — HYDROCODONE POLISTIREX AND CHLORPHENIRAMINE POLISTIREX 5 ML: 10; 8 SUSPENSION, EXTENDED RELEASE ORAL at 23:56

## 2019-05-26 RX ADMIN — ENOXAPARIN SODIUM 40 MG: 40 INJECTION SUBCUTANEOUS at 17:24

## 2019-05-26 RX ADMIN — METOPROLOL TARTRATE 50 MG: 50 TABLET ORAL at 08:36

## 2019-05-26 RX ADMIN — SODIUM CHLORIDE, PRESERVATIVE FREE 3 ML: 5 INJECTION INTRAVENOUS at 08:38

## 2019-05-26 RX ADMIN — INSULIN ASPART 12 UNITS: 100 INJECTION, SOLUTION INTRAVENOUS; SUBCUTANEOUS at 06:49

## 2019-05-26 RX ADMIN — HYDROCODONE POLISTIREX AND CHLORPHENIRAMINE POLISTIREX 2.5 ML: 10; 8 SUSPENSION, EXTENDED RELEASE ORAL at 11:57

## 2019-05-26 RX ADMIN — METOPROLOL TARTRATE 50 MG: 50 TABLET ORAL at 20:51

## 2019-05-26 RX ADMIN — LISINOPRIL 20 MG: 20 TABLET ORAL at 08:36

## 2019-05-26 RX ADMIN — FLUTICASONE PROPIONATE 1 SPRAY: 50 SPRAY, METERED NASAL at 08:44

## 2019-05-26 RX ADMIN — INSULIN ASPART 4 UNITS: 100 INJECTION, SOLUTION INTRAVENOUS; SUBCUTANEOUS at 20:48

## 2019-05-26 RX ADMIN — RANOLAZINE 500 MG: 500 TABLET, FILM COATED, EXTENDED RELEASE ORAL at 08:36

## 2019-05-26 RX ADMIN — ACETYLCYSTEINE 3 ML: 200 SOLUTION ORAL; RESPIRATORY (INHALATION) at 20:13

## 2019-05-26 RX ADMIN — METHYLPREDNISOLONE SODIUM SUCCINATE 40 MG: 40 INJECTION, POWDER, FOR SOLUTION INTRAMUSCULAR; INTRAVENOUS at 23:56

## 2019-05-26 RX ADMIN — GABAPENTIN 600 MG: 300 CAPSULE ORAL at 20:51

## 2019-05-26 RX ADMIN — ATORVASTATIN CALCIUM 80 MG: 80 TABLET, FILM COATED ORAL at 20:51

## 2019-05-26 RX ADMIN — GABAPENTIN 600 MG: 300 CAPSULE ORAL at 08:36

## 2019-05-26 RX ADMIN — AZELASTINE HYDROCHLORIDE 1 SPRAY: 137 SPRAY, METERED NASAL at 08:44

## 2019-05-26 RX ADMIN — MONTELUKAST 10 MG: 10 TABLET, FILM COATED ORAL at 20:51

## 2019-05-26 RX ADMIN — IPRATROPIUM BROMIDE AND ALBUTEROL SULFATE 3 ML: .5; 3 SOLUTION RESPIRATORY (INHALATION) at 07:27

## 2019-05-26 RX ADMIN — INSULIN ASPART 12 UNITS: 100 INJECTION, SOLUTION INTRAVENOUS; SUBCUTANEOUS at 17:25

## 2019-05-26 RX ADMIN — PANTOPRAZOLE SODIUM 40 MG: 40 TABLET, DELAYED RELEASE ORAL at 06:49

## 2019-05-26 RX ADMIN — IPRATROPIUM BROMIDE AND ALBUTEROL SULFATE 3 ML: .5; 3 SOLUTION RESPIRATORY (INHALATION) at 20:14

## 2019-05-26 RX ADMIN — RANOLAZINE 500 MG: 500 TABLET, FILM COATED, EXTENDED RELEASE ORAL at 20:51

## 2019-05-26 RX ADMIN — AZELASTINE HYDROCHLORIDE 1 SPRAY: 137 SPRAY, METERED NASAL at 20:47

## 2019-05-26 RX ADMIN — SODIUM CHLORIDE, PRESERVATIVE FREE 3 ML: 5 INJECTION INTRAVENOUS at 20:52

## 2019-05-26 RX ADMIN — GUAIFENESIN AND DEXTROMETHORPHAN HYDROBROMIDE 1 TABLET: 600; 30 TABLET, EXTENDED RELEASE ORAL at 20:51

## 2019-05-26 RX ADMIN — BUDESONIDE 1 MG: 0.5 INHALANT RESPIRATORY (INHALATION) at 07:27

## 2019-05-26 RX ADMIN — INSULIN ASPART 50 UNITS: 100 INJECTION, SOLUTION INTRAVENOUS; SUBCUTANEOUS at 17:24

## 2019-05-26 RX ADMIN — INSULIN ASPART 50 UNITS: 100 INJECTION, SOLUTION INTRAVENOUS; SUBCUTANEOUS at 11:51

## 2019-05-26 RX ADMIN — FUROSEMIDE 60 MG: 40 TABLET ORAL at 08:36

## 2019-05-26 RX ADMIN — ASPIRIN 325 MG ORAL TABLET 325 MG: 325 PILL ORAL at 08:36

## 2019-05-26 RX ADMIN — AMLODIPINE BESYLATE 2.5 MG: 5 TABLET ORAL at 08:44

## 2019-05-26 RX ADMIN — FLUTICASONE PROPIONATE 1 SPRAY: 50 SPRAY, METERED NASAL at 20:47

## 2019-05-26 RX ADMIN — INSULIN ASPART 20 UNITS: 100 INJECTION, SOLUTION INTRAVENOUS; SUBCUTANEOUS at 11:50

## 2019-05-26 RX ADMIN — IPRATROPIUM BROMIDE AND ALBUTEROL SULFATE 3 ML: .5; 3 SOLUTION RESPIRATORY (INHALATION) at 00:23

## 2019-05-27 LAB
GLUCOSE BLDC GLUCOMTR-MCNC: 234 MG/DL (ref 70–130)
GLUCOSE BLDC GLUCOMTR-MCNC: 235 MG/DL (ref 70–130)
GLUCOSE BLDC GLUCOMTR-MCNC: 275 MG/DL (ref 70–130)
GLUCOSE BLDC GLUCOMTR-MCNC: 320 MG/DL (ref 70–130)

## 2019-05-27 PROCEDURE — 94799 UNLISTED PULMONARY SVC/PX: CPT

## 2019-05-27 PROCEDURE — 63710000001 INSULIN REGULAR HUMAN PER 5 UNITS: Performed by: INTERNAL MEDICINE

## 2019-05-27 PROCEDURE — 25010000002 METHYLPREDNISOLONE PER 125 MG: Performed by: INTERNAL MEDICINE

## 2019-05-27 PROCEDURE — 82962 GLUCOSE BLOOD TEST: CPT

## 2019-05-27 PROCEDURE — 99233 SBSQ HOSP IP/OBS HIGH 50: CPT | Performed by: INTERNAL MEDICINE

## 2019-05-27 PROCEDURE — 25010000002 MAGNESIUM SULFATE 2 GM/50ML SOLUTION: Performed by: INTERNAL MEDICINE

## 2019-05-27 PROCEDURE — 63710000001 INSULIN ASPART PER 5 UNITS: Performed by: INTERNAL MEDICINE

## 2019-05-27 PROCEDURE — 25010000002 METHYLPREDNISOLONE PER 40 MG: Performed by: INTERNAL MEDICINE

## 2019-05-27 PROCEDURE — 25010000002 ENOXAPARIN PER 10 MG: Performed by: INTERNAL MEDICINE

## 2019-05-27 PROCEDURE — 99232 SBSQ HOSP IP/OBS MODERATE 35: CPT | Performed by: INTERNAL MEDICINE

## 2019-05-27 RX ORDER — MAGNESIUM SULFATE HEPTAHYDRATE 40 MG/ML
2 INJECTION, SOLUTION INTRAVENOUS ONCE
Status: COMPLETED | OUTPATIENT
Start: 2019-05-27 | End: 2019-05-27

## 2019-05-27 RX ORDER — SODIUM CHLORIDE FOR INHALATION 3 %
4 VIAL, NEBULIZER (ML) INHALATION EVERY 8 HOURS
Status: DISPENSED | OUTPATIENT
Start: 2019-05-27 | End: 2019-05-31

## 2019-05-27 RX ORDER — METHYLPREDNISOLONE SODIUM SUCCINATE 125 MG/2ML
60 INJECTION, POWDER, LYOPHILIZED, FOR SOLUTION INTRAMUSCULAR; INTRAVENOUS EVERY 6 HOURS
Status: DISCONTINUED | OUTPATIENT
Start: 2019-05-27 | End: 2019-05-31

## 2019-05-27 RX ORDER — BUDESONIDE 0.5 MG/2ML
1 INHALANT ORAL EVERY 8 HOURS SCHEDULED
Status: DISCONTINUED | OUTPATIENT
Start: 2019-05-27 | End: 2019-06-02

## 2019-05-27 RX ADMIN — BUDESONIDE 1 MG: 0.5 INHALANT RESPIRATORY (INHALATION) at 07:09

## 2019-05-27 RX ADMIN — MONTELUKAST 10 MG: 10 TABLET, FILM COATED ORAL at 21:23

## 2019-05-27 RX ADMIN — RANOLAZINE 500 MG: 500 TABLET, FILM COATED, EXTENDED RELEASE ORAL at 21:23

## 2019-05-27 RX ADMIN — ACETYLCYSTEINE 3 ML: 200 SOLUTION ORAL; RESPIRATORY (INHALATION) at 07:10

## 2019-05-27 RX ADMIN — AZELASTINE HYDROCHLORIDE 1 SPRAY: 137 SPRAY, METERED NASAL at 21:24

## 2019-05-27 RX ADMIN — INSULIN ASPART 16 UNITS: 100 INJECTION, SOLUTION INTRAVENOUS; SUBCUTANEOUS at 21:24

## 2019-05-27 RX ADMIN — IPRATROPIUM BROMIDE AND ALBUTEROL SULFATE 3 ML: .5; 3 SOLUTION RESPIRATORY (INHALATION) at 01:12

## 2019-05-27 RX ADMIN — IPRATROPIUM BROMIDE AND ALBUTEROL SULFATE 3 ML: .5; 3 SOLUTION RESPIRATORY (INHALATION) at 19:36

## 2019-05-27 RX ADMIN — SODIUM CHLORIDE, PRESERVATIVE FREE 3 ML: 5 INJECTION INTRAVENOUS at 08:38

## 2019-05-27 RX ADMIN — METHYLPREDNISOLONE SODIUM SUCCINATE 60 MG: 125 INJECTION, POWDER, FOR SOLUTION INTRAMUSCULAR; INTRAVENOUS at 17:44

## 2019-05-27 RX ADMIN — INSULIN ASPART 50 UNITS: 100 INJECTION, SOLUTION INTRAVENOUS; SUBCUTANEOUS at 17:46

## 2019-05-27 RX ADMIN — METHYLPREDNISOLONE SODIUM SUCCINATE 40 MG: 40 INJECTION, POWDER, FOR SOLUTION INTRAMUSCULAR; INTRAVENOUS at 12:02

## 2019-05-27 RX ADMIN — FLUTICASONE PROPIONATE 1 SPRAY: 50 SPRAY, METERED NASAL at 08:36

## 2019-05-27 RX ADMIN — SODIUM CHLORIDE, PRESERVATIVE FREE 3 ML: 5 INJECTION INTRAVENOUS at 21:41

## 2019-05-27 RX ADMIN — GABAPENTIN 600 MG: 300 CAPSULE ORAL at 08:38

## 2019-05-27 RX ADMIN — METOPROLOL TARTRATE 50 MG: 50 TABLET ORAL at 08:38

## 2019-05-27 RX ADMIN — INSULIN ASPART 12 UNITS: 100 INJECTION, SOLUTION INTRAVENOUS; SUBCUTANEOUS at 17:45

## 2019-05-27 RX ADMIN — IPRATROPIUM BROMIDE AND ALBUTEROL SULFATE 3 ML: .5; 3 SOLUTION RESPIRATORY (INHALATION) at 07:10

## 2019-05-27 RX ADMIN — HUMAN INSULIN 10 UNITS: 100 INJECTION, SOLUTION SUBCUTANEOUS at 17:45

## 2019-05-27 RX ADMIN — GUAIFENESIN AND DEXTROMETHORPHAN HYDROBROMIDE 1 TABLET: 600; 30 TABLET, EXTENDED RELEASE ORAL at 08:37

## 2019-05-27 RX ADMIN — INSULIN ASPART 50 UNITS: 100 INJECTION, SOLUTION INTRAVENOUS; SUBCUTANEOUS at 12:02

## 2019-05-27 RX ADMIN — IPRATROPIUM BROMIDE AND ALBUTEROL SULFATE 3 ML: .5; 3 SOLUTION RESPIRATORY (INHALATION) at 13:08

## 2019-05-27 RX ADMIN — CETIRIZINE HYDROCHLORIDE 10 MG: 10 TABLET, FILM COATED ORAL at 21:23

## 2019-05-27 RX ADMIN — ATORVASTATIN CALCIUM 80 MG: 80 TABLET, FILM COATED ORAL at 21:23

## 2019-05-27 RX ADMIN — RANOLAZINE 500 MG: 500 TABLET, FILM COATED, EXTENDED RELEASE ORAL at 08:38

## 2019-05-27 RX ADMIN — GABAPENTIN 600 MG: 300 CAPSULE ORAL at 21:23

## 2019-05-27 RX ADMIN — FUROSEMIDE 60 MG: 40 TABLET ORAL at 08:37

## 2019-05-27 RX ADMIN — GUAIFENESIN AND DEXTROMETHORPHAN HYDROBROMIDE 1 TABLET: 600; 30 TABLET, EXTENDED RELEASE ORAL at 21:23

## 2019-05-27 RX ADMIN — PANTOPRAZOLE SODIUM 40 MG: 40 TABLET, DELAYED RELEASE ORAL at 06:55

## 2019-05-27 RX ADMIN — MAGNESIUM SULFATE HEPTAHYDRATE 2 G: 40 INJECTION, SOLUTION INTRAVENOUS at 15:17

## 2019-05-27 RX ADMIN — ENOXAPARIN SODIUM 40 MG: 40 INJECTION SUBCUTANEOUS at 17:48

## 2019-05-27 RX ADMIN — AMITRIPTYLINE HYDROCHLORIDE 75 MG: 25 TABLET, FILM COATED ORAL at 21:23

## 2019-05-27 RX ADMIN — FLUTICASONE PROPIONATE 1 SPRAY: 50 SPRAY, METERED NASAL at 21:24

## 2019-05-27 RX ADMIN — AMLODIPINE BESYLATE 2.5 MG: 5 TABLET ORAL at 08:38

## 2019-05-27 RX ADMIN — OXYCODONE HYDROCHLORIDE AND ACETAMINOPHEN 500 MG: 500 TABLET ORAL at 08:38

## 2019-05-27 RX ADMIN — LISINOPRIL 20 MG: 20 TABLET ORAL at 08:38

## 2019-05-27 RX ADMIN — INSULIN ASPART 8 UNITS: 100 INJECTION, SOLUTION INTRAVENOUS; SUBCUTANEOUS at 06:53

## 2019-05-27 RX ADMIN — ENOXAPARIN SODIUM 40 MG: 40 INJECTION SUBCUTANEOUS at 06:53

## 2019-05-27 RX ADMIN — METOPROLOL TARTRATE 50 MG: 50 TABLET ORAL at 21:23

## 2019-05-27 RX ADMIN — AZELASTINE HYDROCHLORIDE 1 SPRAY: 137 SPRAY, METERED NASAL at 08:36

## 2019-05-27 RX ADMIN — INSULIN ASPART 12 UNITS: 100 INJECTION, SOLUTION INTRAVENOUS; SUBCUTANEOUS at 12:02

## 2019-05-27 RX ADMIN — ASPIRIN 325 MG ORAL TABLET 325 MG: 325 PILL ORAL at 08:38

## 2019-05-27 RX ADMIN — ACETYLCYSTEINE 3 ML: 200 SOLUTION ORAL; RESPIRATORY (INHALATION) at 01:12

## 2019-05-27 RX ADMIN — INSULIN ASPART 50 UNITS: 100 INJECTION, SOLUTION INTRAVENOUS; SUBCUTANEOUS at 08:37

## 2019-05-28 ENCOUNTER — APPOINTMENT (OUTPATIENT)
Dept: GENERAL RADIOLOGY | Facility: HOSPITAL | Age: 59
End: 2019-05-28

## 2019-05-28 LAB
ANION GAP SERPL CALCULATED.3IONS-SCNC: 15.6 MMOL/L (ref 10–20)
BUN BLD-MCNC: 28 MG/DL (ref 7–20)
BUN/CREAT SERPL: 46.7 (ref 6.3–21.9)
CALCIUM SPEC-SCNC: 8.4 MG/DL (ref 8.4–10.2)
CHLORIDE SERPL-SCNC: 95 MMOL/L (ref 98–107)
CO2 SERPL-SCNC: 28 MMOL/L (ref 26–30)
CREAT BLD-MCNC: 0.6 MG/DL (ref 0.6–1.3)
DEPRECATED RDW RBC AUTO: 38.5 FL (ref 37–54)
ERYTHROCYTE [DISTWIDTH] IN BLOOD BY AUTOMATED COUNT: 13.3 % (ref 12.3–15.4)
GFR SERPL CREATININE-BSD FRML MDRD: 138 ML/MIN/1.73
GLUCOSE BLD-MCNC: 291 MG/DL (ref 74–98)
GLUCOSE BLDC GLUCOMTR-MCNC: 266 MG/DL (ref 70–130)
GLUCOSE BLDC GLUCOMTR-MCNC: 278 MG/DL (ref 70–130)
GLUCOSE BLDC GLUCOMTR-MCNC: 279 MG/DL (ref 70–130)
GLUCOSE BLDC GLUCOMTR-MCNC: 295 MG/DL (ref 70–130)
HCT VFR BLD AUTO: 44.8 % (ref 37.5–51)
HGB BLD-MCNC: 15.2 G/DL (ref 13–17.7)
MCH RBC QN AUTO: 27.2 PG (ref 26.6–33)
MCHC RBC AUTO-ENTMCNC: 33.9 G/DL (ref 31.5–35.7)
MCV RBC AUTO: 80.1 FL (ref 79–97)
PLATELET # BLD AUTO: 174 10*3/MM3 (ref 140–450)
PMV BLD AUTO: 9.7 FL (ref 6–12)
POTASSIUM BLD-SCNC: 4.6 MMOL/L (ref 3.5–5.1)
RBC # BLD AUTO: 5.59 10*6/MM3 (ref 4.14–5.8)
SODIUM BLD-SCNC: 134 MMOL/L (ref 137–145)
WBC NRBC COR # BLD: 14.04 10*3/MM3 (ref 3.4–10.8)

## 2019-05-28 PROCEDURE — 99232 SBSQ HOSP IP/OBS MODERATE 35: CPT | Performed by: NURSE PRACTITIONER

## 2019-05-28 PROCEDURE — 63710000001 INSULIN REGULAR HUMAN PER 5 UNITS: Performed by: INTERNAL MEDICINE

## 2019-05-28 PROCEDURE — 80048 BASIC METABOLIC PNL TOTAL CA: CPT | Performed by: INTERNAL MEDICINE

## 2019-05-28 PROCEDURE — 94799 UNLISTED PULMONARY SVC/PX: CPT

## 2019-05-28 PROCEDURE — 25010000002 ENOXAPARIN PER 10 MG: Performed by: INTERNAL MEDICINE

## 2019-05-28 PROCEDURE — 63710000001 INSULIN ASPART PER 5 UNITS: Performed by: INTERNAL MEDICINE

## 2019-05-28 PROCEDURE — 85027 COMPLETE CBC AUTOMATED: CPT | Performed by: INTERNAL MEDICINE

## 2019-05-28 PROCEDURE — 25010000002 METHYLPREDNISOLONE PER 125 MG: Performed by: INTERNAL MEDICINE

## 2019-05-28 PROCEDURE — 71046 X-RAY EXAM CHEST 2 VIEWS: CPT

## 2019-05-28 PROCEDURE — 99233 SBSQ HOSP IP/OBS HIGH 50: CPT | Performed by: INTERNAL MEDICINE

## 2019-05-28 PROCEDURE — 82962 GLUCOSE BLOOD TEST: CPT

## 2019-05-28 RX ADMIN — GABAPENTIN 600 MG: 300 CAPSULE ORAL at 08:51

## 2019-05-28 RX ADMIN — INSULIN ASPART 50 UNITS: 100 INJECTION, SOLUTION INTRAVENOUS; SUBCUTANEOUS at 12:54

## 2019-05-28 RX ADMIN — ATORVASTATIN CALCIUM 80 MG: 80 TABLET, FILM COATED ORAL at 21:49

## 2019-05-28 RX ADMIN — ENOXAPARIN SODIUM 40 MG: 40 INJECTION SUBCUTANEOUS at 06:04

## 2019-05-28 RX ADMIN — AMITRIPTYLINE HYDROCHLORIDE 75 MG: 25 TABLET, FILM COATED ORAL at 21:48

## 2019-05-28 RX ADMIN — BUDESONIDE 1 MG: 0.5 INHALANT RESPIRATORY (INHALATION) at 00:28

## 2019-05-28 RX ADMIN — IPRATROPIUM BROMIDE AND ALBUTEROL SULFATE 3 ML: .5; 3 SOLUTION RESPIRATORY (INHALATION) at 06:49

## 2019-05-28 RX ADMIN — INSULIN ASPART 12 UNITS: 100 INJECTION, SOLUTION INTRAVENOUS; SUBCUTANEOUS at 12:53

## 2019-05-28 RX ADMIN — IPRATROPIUM BROMIDE AND ALBUTEROL SULFATE 3 ML: .5; 3 SOLUTION RESPIRATORY (INHALATION) at 13:49

## 2019-05-28 RX ADMIN — FLUTICASONE PROPIONATE 1 SPRAY: 50 SPRAY, METERED NASAL at 21:45

## 2019-05-28 RX ADMIN — ASPIRIN 325 MG ORAL TABLET 325 MG: 325 PILL ORAL at 08:50

## 2019-05-28 RX ADMIN — METOPROLOL TARTRATE 50 MG: 50 TABLET ORAL at 08:51

## 2019-05-28 RX ADMIN — AZELASTINE HYDROCHLORIDE 1 SPRAY: 137 SPRAY, METERED NASAL at 09:03

## 2019-05-28 RX ADMIN — LISINOPRIL 20 MG: 20 TABLET ORAL at 08:50

## 2019-05-28 RX ADMIN — IPRATROPIUM BROMIDE AND ALBUTEROL SULFATE 3 ML: .5; 3 SOLUTION RESPIRATORY (INHALATION) at 19:39

## 2019-05-28 RX ADMIN — HUMAN INSULIN 10 UNITS: 100 INJECTION, SOLUTION SUBCUTANEOUS at 17:24

## 2019-05-28 RX ADMIN — HUMAN INSULIN 10 UNITS: 100 INJECTION, SOLUTION SUBCUTANEOUS at 00:01

## 2019-05-28 RX ADMIN — OXYCODONE HYDROCHLORIDE AND ACETAMINOPHEN 500 MG: 500 TABLET ORAL at 08:51

## 2019-05-28 RX ADMIN — SODIUM CHLORIDE SOLN NEBU 3% 4 ML: 3 NEBU SOLN at 06:50

## 2019-05-28 RX ADMIN — GUAIFENESIN AND DEXTROMETHORPHAN HYDROBROMIDE 1 TABLET: 600; 30 TABLET, EXTENDED RELEASE ORAL at 21:48

## 2019-05-28 RX ADMIN — METHYLPREDNISOLONE SODIUM SUCCINATE 60 MG: 125 INJECTION, POWDER, FOR SOLUTION INTRAMUSCULAR; INTRAVENOUS at 17:23

## 2019-05-28 RX ADMIN — CETIRIZINE HYDROCHLORIDE 10 MG: 10 TABLET, FILM COATED ORAL at 21:48

## 2019-05-28 RX ADMIN — HUMAN INSULIN 10 UNITS: 100 INJECTION, SOLUTION SUBCUTANEOUS at 12:54

## 2019-05-28 RX ADMIN — PANTOPRAZOLE SODIUM 40 MG: 40 TABLET, DELAYED RELEASE ORAL at 06:04

## 2019-05-28 RX ADMIN — INSULIN ASPART 12 UNITS: 100 INJECTION, SOLUTION INTRAVENOUS; SUBCUTANEOUS at 17:23

## 2019-05-28 RX ADMIN — AMLODIPINE BESYLATE 2.5 MG: 5 TABLET ORAL at 08:51

## 2019-05-28 RX ADMIN — IPRATROPIUM BROMIDE AND ALBUTEROL SULFATE 3 ML: .5; 3 SOLUTION RESPIRATORY (INHALATION) at 00:28

## 2019-05-28 RX ADMIN — SODIUM CHLORIDE, PRESERVATIVE FREE 3 ML: 5 INJECTION INTRAVENOUS at 21:49

## 2019-05-28 RX ADMIN — ENOXAPARIN SODIUM 40 MG: 40 INJECTION SUBCUTANEOUS at 17:26

## 2019-05-28 RX ADMIN — SODIUM CHLORIDE SOLN NEBU 3% 4 ML: 3 NEBU SOLN at 00:28

## 2019-05-28 RX ADMIN — GABAPENTIN 600 MG: 300 CAPSULE ORAL at 21:49

## 2019-05-28 RX ADMIN — AZELASTINE HYDROCHLORIDE 1 SPRAY: 137 SPRAY, METERED NASAL at 21:45

## 2019-05-28 RX ADMIN — HUMAN INSULIN 10 UNITS: 100 INJECTION, SOLUTION SUBCUTANEOUS at 06:05

## 2019-05-28 RX ADMIN — RANOLAZINE 500 MG: 500 TABLET, FILM COATED, EXTENDED RELEASE ORAL at 21:48

## 2019-05-28 RX ADMIN — METOPROLOL TARTRATE 50 MG: 50 TABLET ORAL at 21:48

## 2019-05-28 RX ADMIN — GUAIFENESIN AND DEXTROMETHORPHAN HYDROBROMIDE 1 TABLET: 600; 30 TABLET, EXTENDED RELEASE ORAL at 08:51

## 2019-05-28 RX ADMIN — METHYLPREDNISOLONE SODIUM SUCCINATE 60 MG: 125 INJECTION, POWDER, FOR SOLUTION INTRAMUSCULAR; INTRAVENOUS at 12:53

## 2019-05-28 RX ADMIN — RANOLAZINE 500 MG: 500 TABLET, FILM COATED, EXTENDED RELEASE ORAL at 08:50

## 2019-05-28 RX ADMIN — FLUTICASONE PROPIONATE 1 SPRAY: 50 SPRAY, METERED NASAL at 09:04

## 2019-05-28 RX ADMIN — BUDESONIDE 1 MG: 0.5 INHALANT RESPIRATORY (INHALATION) at 06:50

## 2019-05-28 RX ADMIN — INSULIN ASPART 50 UNITS: 100 INJECTION, SOLUTION INTRAVENOUS; SUBCUTANEOUS at 08:50

## 2019-05-28 RX ADMIN — SODIUM CHLORIDE, PRESERVATIVE FREE 3 ML: 5 INJECTION INTRAVENOUS at 08:50

## 2019-05-28 RX ADMIN — METHYLPREDNISOLONE SODIUM SUCCINATE 60 MG: 125 INJECTION, POWDER, FOR SOLUTION INTRAMUSCULAR; INTRAVENOUS at 00:01

## 2019-05-28 RX ADMIN — INSULIN ASPART 50 UNITS: 100 INJECTION, SOLUTION INTRAVENOUS; SUBCUTANEOUS at 17:24

## 2019-05-28 RX ADMIN — FUROSEMIDE 60 MG: 40 TABLET ORAL at 08:50

## 2019-05-28 RX ADMIN — INSULIN ASPART 12 UNITS: 100 INJECTION, SOLUTION INTRAVENOUS; SUBCUTANEOUS at 06:48

## 2019-05-28 RX ADMIN — IPRATROPIUM BROMIDE 1.5 MG: 0.5 SOLUTION RESPIRATORY (INHALATION) at 11:51

## 2019-05-28 RX ADMIN — INSULIN ASPART 12 UNITS: 100 INJECTION, SOLUTION INTRAVENOUS; SUBCUTANEOUS at 21:47

## 2019-05-28 RX ADMIN — MONTELUKAST 10 MG: 10 TABLET, FILM COATED ORAL at 21:48

## 2019-05-28 RX ADMIN — METHYLPREDNISOLONE SODIUM SUCCINATE 60 MG: 125 INJECTION, POWDER, FOR SOLUTION INTRAMUSCULAR; INTRAVENOUS at 06:04

## 2019-05-29 LAB
ANION GAP SERPL CALCULATED.3IONS-SCNC: 13.7 MMOL/L (ref 10–20)
BUN BLD-MCNC: 30 MG/DL (ref 7–20)
BUN/CREAT SERPL: 50 (ref 6.3–21.9)
CALCIUM SPEC-SCNC: 8.3 MG/DL (ref 8.4–10.2)
CHLORIDE SERPL-SCNC: 96 MMOL/L (ref 98–107)
CO2 SERPL-SCNC: 26 MMOL/L (ref 26–30)
CREAT BLD-MCNC: 0.6 MG/DL (ref 0.6–1.3)
DEPRECATED RDW RBC AUTO: 38.7 FL (ref 37–54)
ERYTHROCYTE [DISTWIDTH] IN BLOOD BY AUTOMATED COUNT: 13.7 % (ref 12.3–15.4)
GFR SERPL CREATININE-BSD FRML MDRD: 138 ML/MIN/1.73
GLUCOSE BLD-MCNC: 295 MG/DL (ref 74–98)
GLUCOSE BLDC GLUCOMTR-MCNC: 229 MG/DL (ref 70–130)
GLUCOSE BLDC GLUCOMTR-MCNC: 256 MG/DL (ref 70–130)
GLUCOSE BLDC GLUCOMTR-MCNC: 266 MG/DL (ref 70–130)
GLUCOSE BLDC GLUCOMTR-MCNC: 273 MG/DL (ref 70–130)
HCT VFR BLD AUTO: 41.8 % (ref 37.5–51)
HGB BLD-MCNC: 14.2 G/DL (ref 13–17.7)
MCH RBC QN AUTO: 26.5 PG (ref 26.6–33)
MCHC RBC AUTO-ENTMCNC: 34 G/DL (ref 31.5–35.7)
MCV RBC AUTO: 78 FL (ref 79–97)
PLATELET # BLD AUTO: 171 10*3/MM3 (ref 140–450)
PMV BLD AUTO: 10.2 FL (ref 6–12)
POTASSIUM BLD-SCNC: 4.7 MMOL/L (ref 3.5–5.1)
RBC # BLD AUTO: 5.36 10*6/MM3 (ref 4.14–5.8)
SODIUM BLD-SCNC: 131 MMOL/L (ref 137–145)
WBC NRBC COR # BLD: 15.52 10*3/MM3 (ref 3.4–10.8)

## 2019-05-29 PROCEDURE — 99232 SBSQ HOSP IP/OBS MODERATE 35: CPT | Performed by: NURSE PRACTITIONER

## 2019-05-29 PROCEDURE — 85027 COMPLETE CBC AUTOMATED: CPT | Performed by: NURSE PRACTITIONER

## 2019-05-29 PROCEDURE — 94799 UNLISTED PULMONARY SVC/PX: CPT

## 2019-05-29 PROCEDURE — 82962 GLUCOSE BLOOD TEST: CPT

## 2019-05-29 PROCEDURE — 99232 SBSQ HOSP IP/OBS MODERATE 35: CPT | Performed by: INTERNAL MEDICINE

## 2019-05-29 PROCEDURE — 63710000001 INSULIN ASPART PER 5 UNITS: Performed by: INTERNAL MEDICINE

## 2019-05-29 PROCEDURE — 25010000002 METHYLPREDNISOLONE PER 125 MG: Performed by: INTERNAL MEDICINE

## 2019-05-29 PROCEDURE — 97161 PT EVAL LOW COMPLEX 20 MIN: CPT

## 2019-05-29 PROCEDURE — 63710000001 INSULIN REGULAR HUMAN PER 5 UNITS: Performed by: INTERNAL MEDICINE

## 2019-05-29 PROCEDURE — 80048 BASIC METABOLIC PNL TOTAL CA: CPT | Performed by: NURSE PRACTITIONER

## 2019-05-29 PROCEDURE — 97165 OT EVAL LOW COMPLEX 30 MIN: CPT

## 2019-05-29 PROCEDURE — 25010000002 ENOXAPARIN PER 10 MG: Performed by: INTERNAL MEDICINE

## 2019-05-29 RX ADMIN — INSULIN ASPART 50 UNITS: 100 INJECTION, SOLUTION INTRAVENOUS; SUBCUTANEOUS at 17:37

## 2019-05-29 RX ADMIN — HUMAN INSULIN 15 UNITS: 100 INJECTION, SOLUTION SUBCUTANEOUS at 12:41

## 2019-05-29 RX ADMIN — GABAPENTIN 600 MG: 300 CAPSULE ORAL at 22:02

## 2019-05-29 RX ADMIN — METOPROLOL TARTRATE 50 MG: 50 TABLET ORAL at 08:38

## 2019-05-29 RX ADMIN — BUDESONIDE 1 MG: 0.5 INHALANT RESPIRATORY (INHALATION) at 07:05

## 2019-05-29 RX ADMIN — SODIUM CHLORIDE SOLN NEBU 3% 4 ML: 3 NEBU SOLN at 16:13

## 2019-05-29 RX ADMIN — AMITRIPTYLINE HYDROCHLORIDE 75 MG: 25 TABLET, FILM COATED ORAL at 22:01

## 2019-05-29 RX ADMIN — RANOLAZINE 500 MG: 500 TABLET, FILM COATED, EXTENDED RELEASE ORAL at 22:02

## 2019-05-29 RX ADMIN — GABAPENTIN 600 MG: 300 CAPSULE ORAL at 08:39

## 2019-05-29 RX ADMIN — INSULIN ASPART 12 UNITS: 100 INJECTION, SOLUTION INTRAVENOUS; SUBCUTANEOUS at 12:41

## 2019-05-29 RX ADMIN — IPRATROPIUM BROMIDE AND ALBUTEROL SULFATE 3 ML: .5; 3 SOLUTION RESPIRATORY (INHALATION) at 18:51

## 2019-05-29 RX ADMIN — INSULIN ASPART 8 UNITS: 100 INJECTION, SOLUTION INTRAVENOUS; SUBCUTANEOUS at 22:05

## 2019-05-29 RX ADMIN — GUAIFENESIN AND DEXTROMETHORPHAN HYDROBROMIDE 1 TABLET: 600; 30 TABLET, EXTENDED RELEASE ORAL at 08:39

## 2019-05-29 RX ADMIN — HYDROCODONE POLISTIREX AND CHLORPHENIRAMINE POLISTIREX 5 ML: 10; 8 SUSPENSION, EXTENDED RELEASE ORAL at 00:30

## 2019-05-29 RX ADMIN — SODIUM CHLORIDE, PRESERVATIVE FREE 3 ML: 5 INJECTION INTRAVENOUS at 08:40

## 2019-05-29 RX ADMIN — INSULIN ASPART 50 UNITS: 100 INJECTION, SOLUTION INTRAVENOUS; SUBCUTANEOUS at 12:42

## 2019-05-29 RX ADMIN — METHYLPREDNISOLONE SODIUM SUCCINATE 60 MG: 125 INJECTION, POWDER, FOR SOLUTION INTRAMUSCULAR; INTRAVENOUS at 12:40

## 2019-05-29 RX ADMIN — HUMAN INSULIN 10 UNITS: 100 INJECTION, SOLUTION SUBCUTANEOUS at 00:26

## 2019-05-29 RX ADMIN — IPRATROPIUM BROMIDE AND ALBUTEROL SULFATE 3 ML: .5; 3 SOLUTION RESPIRATORY (INHALATION) at 01:02

## 2019-05-29 RX ADMIN — MONTELUKAST 10 MG: 10 TABLET, FILM COATED ORAL at 22:01

## 2019-05-29 RX ADMIN — HUMAN INSULIN 10 UNITS: 100 INJECTION, SOLUTION SUBCUTANEOUS at 07:00

## 2019-05-29 RX ADMIN — IPRATROPIUM BROMIDE AND ALBUTEROL SULFATE 3 ML: .5; 3 SOLUTION RESPIRATORY (INHALATION) at 07:05

## 2019-05-29 RX ADMIN — RANOLAZINE 500 MG: 500 TABLET, FILM COATED, EXTENDED RELEASE ORAL at 08:39

## 2019-05-29 RX ADMIN — FLUTICASONE PROPIONATE 1 SPRAY: 50 SPRAY, METERED NASAL at 08:38

## 2019-05-29 RX ADMIN — METHYLPREDNISOLONE SODIUM SUCCINATE 60 MG: 125 INJECTION, POWDER, FOR SOLUTION INTRAMUSCULAR; INTRAVENOUS at 00:26

## 2019-05-29 RX ADMIN — PANTOPRAZOLE SODIUM 40 MG: 40 TABLET, DELAYED RELEASE ORAL at 07:07

## 2019-05-29 RX ADMIN — SODIUM CHLORIDE, PRESERVATIVE FREE 3 ML: 5 INJECTION INTRAVENOUS at 22:05

## 2019-05-29 RX ADMIN — SODIUM CHLORIDE SOLN NEBU 3% 4 ML: 3 NEBU SOLN at 01:03

## 2019-05-29 RX ADMIN — SODIUM CHLORIDE SOLN NEBU 3% 4 ML: 3 NEBU SOLN at 07:10

## 2019-05-29 RX ADMIN — FLUTICASONE PROPIONATE 1 SPRAY: 50 SPRAY, METERED NASAL at 22:05

## 2019-05-29 RX ADMIN — INSULIN ASPART 12 UNITS: 100 INJECTION, SOLUTION INTRAVENOUS; SUBCUTANEOUS at 07:29

## 2019-05-29 RX ADMIN — BUDESONIDE 1 MG: 0.5 INHALANT RESPIRATORY (INHALATION) at 01:03

## 2019-05-29 RX ADMIN — GUAIFENESIN AND DEXTROMETHORPHAN HYDROBROMIDE 1 TABLET: 600; 30 TABLET, EXTENDED RELEASE ORAL at 22:02

## 2019-05-29 RX ADMIN — IPRATROPIUM BROMIDE AND ALBUTEROL SULFATE 3 ML: .5; 3 SOLUTION RESPIRATORY (INHALATION) at 12:37

## 2019-05-29 RX ADMIN — AMLODIPINE BESYLATE 2.5 MG: 5 TABLET ORAL at 08:39

## 2019-05-29 RX ADMIN — ATORVASTATIN CALCIUM 80 MG: 80 TABLET, FILM COATED ORAL at 22:02

## 2019-05-29 RX ADMIN — OXYCODONE HYDROCHLORIDE AND ACETAMINOPHEN 500 MG: 500 TABLET ORAL at 08:39

## 2019-05-29 RX ADMIN — LISINOPRIL 20 MG: 20 TABLET ORAL at 08:39

## 2019-05-29 RX ADMIN — CETIRIZINE HYDROCHLORIDE 10 MG: 10 TABLET, FILM COATED ORAL at 22:01

## 2019-05-29 RX ADMIN — INSULIN ASPART 12 UNITS: 100 INJECTION, SOLUTION INTRAVENOUS; SUBCUTANEOUS at 17:36

## 2019-05-29 RX ADMIN — METHYLPREDNISOLONE SODIUM SUCCINATE 60 MG: 125 INJECTION, POWDER, FOR SOLUTION INTRAMUSCULAR; INTRAVENOUS at 07:07

## 2019-05-29 RX ADMIN — ENOXAPARIN SODIUM 40 MG: 40 INJECTION SUBCUTANEOUS at 17:36

## 2019-05-29 RX ADMIN — ENOXAPARIN SODIUM 40 MG: 40 INJECTION SUBCUTANEOUS at 07:07

## 2019-05-29 RX ADMIN — BUDESONIDE 1 MG: 0.5 INHALANT RESPIRATORY (INHALATION) at 16:13

## 2019-05-29 RX ADMIN — METHYLPREDNISOLONE SODIUM SUCCINATE 60 MG: 125 INJECTION, POWDER, FOR SOLUTION INTRAMUSCULAR; INTRAVENOUS at 17:36

## 2019-05-29 RX ADMIN — AZELASTINE HYDROCHLORIDE 1 SPRAY: 137 SPRAY, METERED NASAL at 22:06

## 2019-05-29 RX ADMIN — HUMAN INSULIN 15 UNITS: 100 INJECTION, SOLUTION SUBCUTANEOUS at 17:36

## 2019-05-29 RX ADMIN — FUROSEMIDE 60 MG: 40 TABLET ORAL at 08:38

## 2019-05-29 RX ADMIN — AZELASTINE HYDROCHLORIDE 1 SPRAY: 137 SPRAY, METERED NASAL at 08:38

## 2019-05-29 RX ADMIN — INSULIN ASPART 50 UNITS: 100 INJECTION, SOLUTION INTRAVENOUS; SUBCUTANEOUS at 08:39

## 2019-05-29 RX ADMIN — ASPIRIN 325 MG ORAL TABLET 325 MG: 325 PILL ORAL at 08:38

## 2019-05-29 RX ADMIN — METOPROLOL TARTRATE 50 MG: 50 TABLET ORAL at 22:02

## 2019-05-30 ENCOUNTER — APPOINTMENT (OUTPATIENT)
Dept: CT IMAGING | Facility: HOSPITAL | Age: 59
End: 2019-05-30

## 2019-05-30 LAB
ANION GAP SERPL CALCULATED.3IONS-SCNC: 13.3 MMOL/L (ref 10–20)
BUN BLD-MCNC: 31 MG/DL (ref 7–20)
BUN/CREAT SERPL: 44.3 (ref 6.3–21.9)
CALCIUM SPEC-SCNC: 8.4 MG/DL (ref 8.4–10.2)
CHLORIDE SERPL-SCNC: 96 MMOL/L (ref 98–107)
CO2 SERPL-SCNC: 29 MMOL/L (ref 26–30)
CREAT BLD-MCNC: 0.7 MG/DL (ref 0.6–1.3)
DEPRECATED RDW RBC AUTO: 38.9 FL (ref 37–54)
ERYTHROCYTE [DISTWIDTH] IN BLOOD BY AUTOMATED COUNT: 13.8 % (ref 12.3–15.4)
GFR SERPL CREATININE-BSD FRML MDRD: 116 ML/MIN/1.73
GLUCOSE BLD-MCNC: 134 MG/DL (ref 74–98)
GLUCOSE BLDC GLUCOMTR-MCNC: 126 MG/DL (ref 70–130)
GLUCOSE BLDC GLUCOMTR-MCNC: 248 MG/DL (ref 70–130)
GLUCOSE BLDC GLUCOMTR-MCNC: 286 MG/DL (ref 70–130)
GLUCOSE BLDC GLUCOMTR-MCNC: 367 MG/DL (ref 70–130)
HCT VFR BLD AUTO: 45 % (ref 37.5–51)
HGB BLD-MCNC: 14.9 G/DL (ref 13–17.7)
MCH RBC QN AUTO: 26.1 PG (ref 26.6–33)
MCHC RBC AUTO-ENTMCNC: 33.1 G/DL (ref 31.5–35.7)
MCV RBC AUTO: 78.8 FL (ref 79–97)
PLATELET # BLD AUTO: 185 10*3/MM3 (ref 140–450)
PMV BLD AUTO: 10.4 FL (ref 6–12)
POTASSIUM BLD-SCNC: 4.3 MMOL/L (ref 3.5–5.1)
RBC # BLD AUTO: 5.71 10*6/MM3 (ref 4.14–5.8)
SODIUM BLD-SCNC: 134 MMOL/L (ref 137–145)
WBC NRBC COR # BLD: 18.35 10*3/MM3 (ref 3.4–10.8)

## 2019-05-30 PROCEDURE — 25010000002 METHYLPREDNISOLONE PER 125 MG: Performed by: INTERNAL MEDICINE

## 2019-05-30 PROCEDURE — 85027 COMPLETE CBC AUTOMATED: CPT | Performed by: NURSE PRACTITIONER

## 2019-05-30 PROCEDURE — 80048 BASIC METABOLIC PNL TOTAL CA: CPT | Performed by: NURSE PRACTITIONER

## 2019-05-30 PROCEDURE — 94799 UNLISTED PULMONARY SVC/PX: CPT

## 2019-05-30 PROCEDURE — 99232 SBSQ HOSP IP/OBS MODERATE 35: CPT | Performed by: NURSE PRACTITIONER

## 2019-05-30 PROCEDURE — 63710000001 INSULIN ASPART PER 5 UNITS: Performed by: INTERNAL MEDICINE

## 2019-05-30 PROCEDURE — 63710000001 INSULIN REGULAR HUMAN PER 5 UNITS: Performed by: INTERNAL MEDICINE

## 2019-05-30 PROCEDURE — 25010000002 ENOXAPARIN PER 10 MG: Performed by: INTERNAL MEDICINE

## 2019-05-30 PROCEDURE — 71275 CT ANGIOGRAPHY CHEST: CPT

## 2019-05-30 PROCEDURE — 25010000002 IOPAMIDOL 61 % SOLUTION: Performed by: INTERNAL MEDICINE

## 2019-05-30 PROCEDURE — 82962 GLUCOSE BLOOD TEST: CPT

## 2019-05-30 RX ADMIN — INSULIN ASPART 50 UNITS: 100 INJECTION, SOLUTION INTRAVENOUS; SUBCUTANEOUS at 11:57

## 2019-05-30 RX ADMIN — METHYLPREDNISOLONE SODIUM SUCCINATE 60 MG: 125 INJECTION, POWDER, FOR SOLUTION INTRAMUSCULAR; INTRAVENOUS at 00:13

## 2019-05-30 RX ADMIN — GABAPENTIN 600 MG: 300 CAPSULE ORAL at 20:29

## 2019-05-30 RX ADMIN — METHYLPREDNISOLONE SODIUM SUCCINATE 60 MG: 125 INJECTION, POWDER, FOR SOLUTION INTRAMUSCULAR; INTRAVENOUS at 13:55

## 2019-05-30 RX ADMIN — AMITRIPTYLINE HYDROCHLORIDE 75 MG: 25 TABLET, FILM COATED ORAL at 20:28

## 2019-05-30 RX ADMIN — RANOLAZINE 500 MG: 500 TABLET, FILM COATED, EXTENDED RELEASE ORAL at 20:29

## 2019-05-30 RX ADMIN — GUAIFENESIN AND DEXTROMETHORPHAN HYDROBROMIDE 1 TABLET: 600; 30 TABLET, EXTENDED RELEASE ORAL at 20:28

## 2019-05-30 RX ADMIN — SODIUM CHLORIDE, PRESERVATIVE FREE 10 ML: 5 INJECTION INTRAVENOUS at 00:13

## 2019-05-30 RX ADMIN — ATORVASTATIN CALCIUM 80 MG: 80 TABLET, FILM COATED ORAL at 20:29

## 2019-05-30 RX ADMIN — HUMAN INSULIN 15 UNITS: 100 INJECTION, SOLUTION SUBCUTANEOUS at 06:31

## 2019-05-30 RX ADMIN — IPRATROPIUM BROMIDE AND ALBUTEROL SULFATE 3 ML: .5; 3 SOLUTION RESPIRATORY (INHALATION) at 00:45

## 2019-05-30 RX ADMIN — METHYLPREDNISOLONE SODIUM SUCCINATE 60 MG: 125 INJECTION, POWDER, FOR SOLUTION INTRAMUSCULAR; INTRAVENOUS at 18:38

## 2019-05-30 RX ADMIN — IPRATROPIUM BROMIDE AND ALBUTEROL SULFATE 3 ML: .5; 3 SOLUTION RESPIRATORY (INHALATION) at 07:21

## 2019-05-30 RX ADMIN — ENOXAPARIN SODIUM 40 MG: 40 INJECTION SUBCUTANEOUS at 18:38

## 2019-05-30 RX ADMIN — AZELASTINE HYDROCHLORIDE 1 SPRAY: 137 SPRAY, METERED NASAL at 20:29

## 2019-05-30 RX ADMIN — AMLODIPINE BESYLATE 2.5 MG: 5 TABLET ORAL at 08:48

## 2019-05-30 RX ADMIN — FLUTICASONE PROPIONATE 1 SPRAY: 50 SPRAY, METERED NASAL at 08:47

## 2019-05-30 RX ADMIN — HUMAN INSULIN 15 UNITS: 100 INJECTION, SOLUTION SUBCUTANEOUS at 18:38

## 2019-05-30 RX ADMIN — SODIUM CHLORIDE, PRESERVATIVE FREE 3 ML: 5 INJECTION INTRAVENOUS at 20:33

## 2019-05-30 RX ADMIN — BUDESONIDE 1 MG: 0.5 INHALANT RESPIRATORY (INHALATION) at 15:49

## 2019-05-30 RX ADMIN — INSULIN ASPART 50 UNITS: 100 INJECTION, SOLUTION INTRAVENOUS; SUBCUTANEOUS at 08:57

## 2019-05-30 RX ADMIN — INSULIN ASPART 12 UNITS: 100 INJECTION, SOLUTION INTRAVENOUS; SUBCUTANEOUS at 17:00

## 2019-05-30 RX ADMIN — GABAPENTIN 600 MG: 300 CAPSULE ORAL at 08:50

## 2019-05-30 RX ADMIN — IPRATROPIUM BROMIDE AND ALBUTEROL SULFATE 3 ML: .5; 3 SOLUTION RESPIRATORY (INHALATION) at 19:17

## 2019-05-30 RX ADMIN — METOPROLOL TARTRATE 50 MG: 50 TABLET ORAL at 20:29

## 2019-05-30 RX ADMIN — SODIUM CHLORIDE SOLN NEBU 3% 4 ML: 3 NEBU SOLN at 15:49

## 2019-05-30 RX ADMIN — LISINOPRIL 20 MG: 20 TABLET ORAL at 08:50

## 2019-05-30 RX ADMIN — METOPROLOL TARTRATE 50 MG: 50 TABLET ORAL at 08:50

## 2019-05-30 RX ADMIN — CETIRIZINE HYDROCHLORIDE 10 MG: 10 TABLET, FILM COATED ORAL at 20:29

## 2019-05-30 RX ADMIN — FLUTICASONE PROPIONATE 1 SPRAY: 50 SPRAY, METERED NASAL at 20:30

## 2019-05-30 RX ADMIN — OXYCODONE HYDROCHLORIDE AND ACETAMINOPHEN 500 MG: 500 TABLET ORAL at 08:50

## 2019-05-30 RX ADMIN — RANOLAZINE 500 MG: 500 TABLET, FILM COATED, EXTENDED RELEASE ORAL at 08:50

## 2019-05-30 RX ADMIN — HUMAN INSULIN 15 UNITS: 100 INJECTION, SOLUTION SUBCUTANEOUS at 13:58

## 2019-05-30 RX ADMIN — ASPIRIN 325 MG ORAL TABLET 325 MG: 325 PILL ORAL at 08:48

## 2019-05-30 RX ADMIN — HUMAN INSULIN 15 UNITS: 100 INJECTION, SOLUTION SUBCUTANEOUS at 00:13

## 2019-05-30 RX ADMIN — BUDESONIDE 1 MG: 0.5 INHALANT RESPIRATORY (INHALATION) at 07:21

## 2019-05-30 RX ADMIN — GUAIFENESIN AND DEXTROMETHORPHAN HYDROBROMIDE 1 TABLET: 600; 30 TABLET, EXTENDED RELEASE ORAL at 08:50

## 2019-05-30 RX ADMIN — INSULIN ASPART 20 UNITS: 100 INJECTION, SOLUTION INTRAVENOUS; SUBCUTANEOUS at 20:30

## 2019-05-30 RX ADMIN — FUROSEMIDE 60 MG: 40 TABLET ORAL at 08:53

## 2019-05-30 RX ADMIN — IPRATROPIUM BROMIDE AND ALBUTEROL SULFATE 3 ML: .5; 3 SOLUTION RESPIRATORY (INHALATION) at 12:58

## 2019-05-30 RX ADMIN — MONTELUKAST 10 MG: 10 TABLET, FILM COATED ORAL at 20:28

## 2019-05-30 RX ADMIN — METHYLPREDNISOLONE SODIUM SUCCINATE 60 MG: 125 INJECTION, POWDER, FOR SOLUTION INTRAMUSCULAR; INTRAVENOUS at 06:29

## 2019-05-30 RX ADMIN — IOPAMIDOL 100 ML: 612 INJECTION, SOLUTION INTRAVENOUS at 12:00

## 2019-05-30 RX ADMIN — SODIUM CHLORIDE, PRESERVATIVE FREE 3 ML: 5 INJECTION INTRAVENOUS at 08:51

## 2019-05-30 RX ADMIN — AZELASTINE HYDROCHLORIDE 1 SPRAY: 137 SPRAY, METERED NASAL at 08:47

## 2019-05-30 RX ADMIN — SODIUM CHLORIDE SOLN NEBU 3% 4 ML: 3 NEBU SOLN at 07:22

## 2019-05-30 RX ADMIN — SODIUM CHLORIDE SOLN NEBU 3% 4 ML: 3 NEBU SOLN at 00:45

## 2019-05-30 RX ADMIN — ENOXAPARIN SODIUM 40 MG: 40 INJECTION SUBCUTANEOUS at 06:29

## 2019-05-30 RX ADMIN — SODIUM CHLORIDE, PRESERVATIVE FREE 10 ML: 5 INJECTION INTRAVENOUS at 06:30

## 2019-05-30 RX ADMIN — BUDESONIDE 1 MG: 0.5 INHALANT RESPIRATORY (INHALATION) at 00:45

## 2019-05-30 RX ADMIN — PANTOPRAZOLE SODIUM 40 MG: 40 TABLET, DELAYED RELEASE ORAL at 06:30

## 2019-05-30 RX ADMIN — INSULIN ASPART 50 UNITS: 100 INJECTION, SOLUTION INTRAVENOUS; SUBCUTANEOUS at 17:01

## 2019-05-30 RX ADMIN — INSULIN ASPART 8 UNITS: 100 INJECTION, SOLUTION INTRAVENOUS; SUBCUTANEOUS at 11:58

## 2019-05-31 ENCOUNTER — APPOINTMENT (OUTPATIENT)
Dept: CARDIOLOGY | Facility: HOSPITAL | Age: 59
End: 2019-05-31

## 2019-05-31 LAB
ANION GAP SERPL CALCULATED.3IONS-SCNC: 13.2 MMOL/L (ref 10–20)
BASOPHILS # BLD AUTO: 0.1 10*3/MM3 (ref 0–0.2)
BASOPHILS NFR BLD AUTO: 0.4 % (ref 0–1.5)
BUN BLD-MCNC: 28 MG/DL (ref 7–20)
BUN/CREAT SERPL: 46.7 (ref 6.3–21.9)
CALCIUM SPEC-SCNC: 8.1 MG/DL (ref 8.4–10.2)
CHLORIDE SERPL-SCNC: 96 MMOL/L (ref 98–107)
CO2 SERPL-SCNC: 26 MMOL/L (ref 26–30)
CREAT BLD-MCNC: 0.6 MG/DL (ref 0.6–1.3)
DEPRECATED RDW RBC AUTO: 40 FL (ref 37–54)
EOSINOPHIL # BLD AUTO: 0 10*3/MM3 (ref 0–0.4)
EOSINOPHIL NFR BLD AUTO: 0 % (ref 0.3–6.2)
ERYTHROCYTE [DISTWIDTH] IN BLOOD BY AUTOMATED COUNT: 13.8 % (ref 12.3–15.4)
GFR SERPL CREATININE-BSD FRML MDRD: 138 ML/MIN/1.73
GLUCOSE BLD-MCNC: 203 MG/DL (ref 74–98)
GLUCOSE BLDC GLUCOMTR-MCNC: 102 MG/DL (ref 70–130)
GLUCOSE BLDC GLUCOMTR-MCNC: 168 MG/DL (ref 70–130)
GLUCOSE BLDC GLUCOMTR-MCNC: 177 MG/DL (ref 70–130)
GLUCOSE BLDC GLUCOMTR-MCNC: 208 MG/DL (ref 70–130)
GLUCOSE BLDC GLUCOMTR-MCNC: 289 MG/DL (ref 70–130)
HCT VFR BLD AUTO: 47.3 % (ref 37.5–51)
HGB BLD-MCNC: 15.5 G/DL (ref 13–17.7)
IMM GRANULOCYTES # BLD AUTO: 1.07 10*3/MM3 (ref 0–0.05)
IMM GRANULOCYTES NFR BLD AUTO: 4.5 % (ref 0–0.5)
LYMPHOCYTES # BLD AUTO: 0.68 10*3/MM3 (ref 0.7–3.1)
LYMPHOCYTES NFR BLD AUTO: 2.9 % (ref 19.6–45.3)
MCH RBC QN AUTO: 26.4 PG (ref 26.6–33)
MCHC RBC AUTO-ENTMCNC: 32.8 G/DL (ref 31.5–35.7)
MCV RBC AUTO: 80.4 FL (ref 79–97)
MONOCYTES # BLD AUTO: 1.02 10*3/MM3 (ref 0.1–0.9)
MONOCYTES NFR BLD AUTO: 4.3 % (ref 5–12)
NEUTROPHILS # BLD AUTO: 20.93 10*3/MM3 (ref 1.7–7)
NEUTROPHILS NFR BLD AUTO: 87.9 % (ref 42.7–76)
NRBC BLD AUTO-RTO: 0 /100 WBC (ref 0–0.2)
PLATELET # BLD AUTO: 184 10*3/MM3 (ref 140–450)
PMV BLD AUTO: 10.3 FL (ref 6–12)
POTASSIUM BLD-SCNC: 4.2 MMOL/L (ref 3.5–5.1)
RBC # BLD AUTO: 5.88 10*6/MM3 (ref 4.14–5.8)
SODIUM BLD-SCNC: 131 MMOL/L (ref 137–145)
TROPONIN I SERPL-MCNC: <0.012 NG/ML (ref 0–0.03)
WBC NRBC COR # BLD: 23.8 10*3/MM3 (ref 3.4–10.8)

## 2019-05-31 PROCEDURE — 94799 UNLISTED PULMONARY SVC/PX: CPT

## 2019-05-31 PROCEDURE — 80048 BASIC METABOLIC PNL TOTAL CA: CPT | Performed by: NURSE PRACTITIONER

## 2019-05-31 PROCEDURE — 85025 COMPLETE CBC W/AUTO DIFF WBC: CPT | Performed by: NURSE PRACTITIONER

## 2019-05-31 PROCEDURE — 63710000001 INSULIN ASPART PER 5 UNITS: Performed by: INTERNAL MEDICINE

## 2019-05-31 PROCEDURE — 99232 SBSQ HOSP IP/OBS MODERATE 35: CPT | Performed by: NURSE PRACTITIONER

## 2019-05-31 PROCEDURE — 63710000001 INSULIN REGULAR HUMAN PER 5 UNITS: Performed by: INTERNAL MEDICINE

## 2019-05-31 PROCEDURE — 25010000002 METHYLPREDNISOLONE PER 125 MG: Performed by: INTERNAL MEDICINE

## 2019-05-31 PROCEDURE — 25010000002 FUROSEMIDE PER 20 MG: Performed by: NURSE PRACTITIONER

## 2019-05-31 PROCEDURE — 94760 N-INVAS EAR/PLS OXIMETRY 1: CPT

## 2019-05-31 PROCEDURE — 93306 TTE W/DOPPLER COMPLETE: CPT

## 2019-05-31 PROCEDURE — 82962 GLUCOSE BLOOD TEST: CPT

## 2019-05-31 PROCEDURE — 25010000002 ENOXAPARIN PER 10 MG: Performed by: INTERNAL MEDICINE

## 2019-05-31 PROCEDURE — 84484 ASSAY OF TROPONIN QUANT: CPT | Performed by: NURSE PRACTITIONER

## 2019-05-31 PROCEDURE — 85060 BLOOD SMEAR INTERPRETATION: CPT | Performed by: NURSE PRACTITIONER

## 2019-05-31 PROCEDURE — 25010000002 METHYLPREDNISOLONE PER 125 MG: Performed by: NURSE PRACTITIONER

## 2019-05-31 PROCEDURE — 94660 CPAP INITIATION&MGMT: CPT

## 2019-05-31 RX ORDER — METHYLPREDNISOLONE SODIUM SUCCINATE 125 MG/2ML
60 INJECTION, POWDER, LYOPHILIZED, FOR SOLUTION INTRAMUSCULAR; INTRAVENOUS EVERY 8 HOURS
Status: DISCONTINUED | OUTPATIENT
Start: 2019-05-31 | End: 2019-06-02

## 2019-05-31 RX ORDER — FUROSEMIDE 10 MG/ML
20 INJECTION INTRAMUSCULAR; INTRAVENOUS ONCE
Status: COMPLETED | OUTPATIENT
Start: 2019-05-31 | End: 2019-05-31

## 2019-05-31 RX ADMIN — INSULIN ASPART 8 UNITS: 100 INJECTION, SOLUTION INTRAVENOUS; SUBCUTANEOUS at 06:59

## 2019-05-31 RX ADMIN — ATORVASTATIN CALCIUM 80 MG: 80 TABLET, FILM COATED ORAL at 20:42

## 2019-05-31 RX ADMIN — SODIUM CHLORIDE SOLN NEBU 3% 4 ML: 3 NEBU SOLN at 07:04

## 2019-05-31 RX ADMIN — GABAPENTIN 600 MG: 300 CAPSULE ORAL at 09:10

## 2019-05-31 RX ADMIN — PANTOPRAZOLE SODIUM 40 MG: 40 TABLET, DELAYED RELEASE ORAL at 06:58

## 2019-05-31 RX ADMIN — FLUTICASONE PROPIONATE 1 SPRAY: 50 SPRAY, METERED NASAL at 09:15

## 2019-05-31 RX ADMIN — FLUTICASONE PROPIONATE 1 SPRAY: 50 SPRAY, METERED NASAL at 20:45

## 2019-05-31 RX ADMIN — BUDESONIDE 1 MG: 0.5 INHALANT RESPIRATORY (INHALATION) at 07:03

## 2019-05-31 RX ADMIN — ENOXAPARIN SODIUM 40 MG: 40 INJECTION SUBCUTANEOUS at 06:59

## 2019-05-31 RX ADMIN — BUDESONIDE 1 MG: 0.5 INHALANT RESPIRATORY (INHALATION) at 16:10

## 2019-05-31 RX ADMIN — GUAIFENESIN AND DEXTROMETHORPHAN HYDROBROMIDE 1 TABLET: 600; 30 TABLET, EXTENDED RELEASE ORAL at 20:42

## 2019-05-31 RX ADMIN — ONDANSETRON 4 MG: 4 TABLET, ORALLY DISINTEGRATING ORAL at 23:51

## 2019-05-31 RX ADMIN — RANOLAZINE 500 MG: 500 TABLET, FILM COATED, EXTENDED RELEASE ORAL at 20:42

## 2019-05-31 RX ADMIN — ENOXAPARIN SODIUM 40 MG: 40 INJECTION SUBCUTANEOUS at 17:39

## 2019-05-31 RX ADMIN — INSULIN ASPART 4 UNITS: 100 INJECTION, SOLUTION INTRAVENOUS; SUBCUTANEOUS at 20:43

## 2019-05-31 RX ADMIN — IPRATROPIUM BROMIDE AND ALBUTEROL SULFATE 3 ML: .5; 3 SOLUTION RESPIRATORY (INHALATION) at 12:23

## 2019-05-31 RX ADMIN — LISINOPRIL 20 MG: 20 TABLET ORAL at 09:10

## 2019-05-31 RX ADMIN — HUMAN INSULIN 15 UNITS: 100 INJECTION, SOLUTION SUBCUTANEOUS at 01:09

## 2019-05-31 RX ADMIN — SODIUM CHLORIDE, PRESERVATIVE FREE 3 ML: 5 INJECTION INTRAVENOUS at 09:10

## 2019-05-31 RX ADMIN — SODIUM CHLORIDE, PRESERVATIVE FREE 3 ML: 5 INJECTION INTRAVENOUS at 20:46

## 2019-05-31 RX ADMIN — ASPIRIN 325 MG ORAL TABLET 325 MG: 325 PILL ORAL at 09:10

## 2019-05-31 RX ADMIN — METOPROLOL TARTRATE 50 MG: 50 TABLET ORAL at 09:10

## 2019-05-31 RX ADMIN — GABAPENTIN 600 MG: 300 CAPSULE ORAL at 20:42

## 2019-05-31 RX ADMIN — HUMAN INSULIN 15 UNITS: 100 INJECTION, SOLUTION SUBCUTANEOUS at 17:40

## 2019-05-31 RX ADMIN — METHYLPREDNISOLONE SODIUM SUCCINATE 60 MG: 125 INJECTION, POWDER, FOR SOLUTION INTRAMUSCULAR; INTRAVENOUS at 01:00

## 2019-05-31 RX ADMIN — INSULIN ASPART 12 UNITS: 100 INJECTION, SOLUTION INTRAVENOUS; SUBCUTANEOUS at 12:55

## 2019-05-31 RX ADMIN — GUAIFENESIN AND DEXTROMETHORPHAN HYDROBROMIDE 1 TABLET: 600; 30 TABLET, EXTENDED RELEASE ORAL at 09:10

## 2019-05-31 RX ADMIN — HUMAN INSULIN 15 UNITS: 100 INJECTION, SOLUTION SUBCUTANEOUS at 06:59

## 2019-05-31 RX ADMIN — INSULIN ASPART 50 UNITS: 100 INJECTION, SOLUTION INTRAVENOUS; SUBCUTANEOUS at 12:55

## 2019-05-31 RX ADMIN — INSULIN ASPART 50 UNITS: 100 INJECTION, SOLUTION INTRAVENOUS; SUBCUTANEOUS at 17:40

## 2019-05-31 RX ADMIN — OXYCODONE HYDROCHLORIDE AND ACETAMINOPHEN 500 MG: 500 TABLET ORAL at 09:10

## 2019-05-31 RX ADMIN — AMITRIPTYLINE HYDROCHLORIDE 75 MG: 25 TABLET, FILM COATED ORAL at 20:42

## 2019-05-31 RX ADMIN — HUMAN INSULIN 15 UNITS: 100 INJECTION, SOLUTION SUBCUTANEOUS at 13:02

## 2019-05-31 RX ADMIN — BUDESONIDE 1 MG: 0.5 INHALANT RESPIRATORY (INHALATION) at 00:00

## 2019-05-31 RX ADMIN — INSULIN ASPART 50 UNITS: 100 INJECTION, SOLUTION INTRAVENOUS; SUBCUTANEOUS at 08:29

## 2019-05-31 RX ADMIN — METHYLPREDNISOLONE SODIUM SUCCINATE 60 MG: 125 INJECTION, POWDER, FOR SOLUTION INTRAMUSCULAR; INTRAVENOUS at 23:52

## 2019-05-31 RX ADMIN — CETIRIZINE HYDROCHLORIDE 10 MG: 10 TABLET, FILM COATED ORAL at 20:42

## 2019-05-31 RX ADMIN — IPRATROPIUM BROMIDE AND ALBUTEROL SULFATE 3 ML: .5; 3 SOLUTION RESPIRATORY (INHALATION) at 00:00

## 2019-05-31 RX ADMIN — AMLODIPINE BESYLATE 2.5 MG: 5 TABLET ORAL at 09:10

## 2019-05-31 RX ADMIN — FUROSEMIDE 20 MG: 10 INJECTION, SOLUTION INTRAMUSCULAR; INTRAVENOUS at 12:55

## 2019-05-31 RX ADMIN — SODIUM CHLORIDE SOLN NEBU 3% 4 ML: 3 NEBU SOLN at 00:00

## 2019-05-31 RX ADMIN — AZELASTINE HYDROCHLORIDE 1 SPRAY: 137 SPRAY, METERED NASAL at 09:15

## 2019-05-31 RX ADMIN — METHYLPREDNISOLONE SODIUM SUCCINATE 60 MG: 125 INJECTION, POWDER, FOR SOLUTION INTRAMUSCULAR; INTRAVENOUS at 16:18

## 2019-05-31 RX ADMIN — IPRATROPIUM BROMIDE AND ALBUTEROL SULFATE 3 ML: .5; 3 SOLUTION RESPIRATORY (INHALATION) at 07:03

## 2019-05-31 RX ADMIN — MONTELUKAST 10 MG: 10 TABLET, FILM COATED ORAL at 20:42

## 2019-05-31 RX ADMIN — HYDROCODONE BITARTRATE AND ACETAMINOPHEN 1 TABLET: 5; 325 TABLET ORAL at 23:50

## 2019-05-31 RX ADMIN — METHYLPREDNISOLONE SODIUM SUCCINATE 60 MG: 125 INJECTION, POWDER, FOR SOLUTION INTRAMUSCULAR; INTRAVENOUS at 06:58

## 2019-05-31 RX ADMIN — FUROSEMIDE 60 MG: 40 TABLET ORAL at 09:09

## 2019-05-31 RX ADMIN — ACETAMINOPHEN 650 MG: 325 TABLET, FILM COATED ORAL at 17:45

## 2019-05-31 RX ADMIN — HUMAN INSULIN 15 UNITS: 100 INJECTION, SOLUTION SUBCUTANEOUS at 23:51

## 2019-05-31 RX ADMIN — INSULIN ASPART 4 UNITS: 100 INJECTION, SOLUTION INTRAVENOUS; SUBCUTANEOUS at 17:39

## 2019-05-31 RX ADMIN — METOPROLOL TARTRATE 50 MG: 50 TABLET ORAL at 20:43

## 2019-05-31 RX ADMIN — AZELASTINE HYDROCHLORIDE 1 SPRAY: 137 SPRAY, METERED NASAL at 20:45

## 2019-05-31 RX ADMIN — RANOLAZINE 500 MG: 500 TABLET, FILM COATED, EXTENDED RELEASE ORAL at 09:10

## 2019-06-01 ENCOUNTER — APPOINTMENT (OUTPATIENT)
Dept: GENERAL RADIOLOGY | Facility: HOSPITAL | Age: 59
End: 2019-06-01

## 2019-06-01 ENCOUNTER — APPOINTMENT (OUTPATIENT)
Dept: CT IMAGING | Facility: HOSPITAL | Age: 59
End: 2019-06-01

## 2019-06-01 LAB
ALBUMIN SERPL-MCNC: 3.1 G/DL (ref 3.5–5)
ALBUMIN/GLOB SERPL: 1.2 G/DL (ref 1–2)
ALP SERPL-CCNC: 68 U/L (ref 38–126)
ALT SERPL W P-5'-P-CCNC: 105 U/L (ref 13–69)
ANION GAP SERPL CALCULATED.3IONS-SCNC: 13.5 MMOL/L (ref 10–20)
AST SERPL-CCNC: 58 U/L (ref 15–46)
BASOPHILS # BLD AUTO: 0.08 10*3/MM3 (ref 0–0.2)
BASOPHILS NFR BLD AUTO: 0.3 % (ref 0–1.5)
BILIRUB SERPL-MCNC: 1 MG/DL (ref 0.2–1.3)
BUN BLD-MCNC: 31 MG/DL (ref 7–20)
BUN/CREAT SERPL: 51.7 (ref 6.3–21.9)
CALCIUM SPEC-SCNC: 7.8 MG/DL (ref 8.4–10.2)
CHLORIDE SERPL-SCNC: 95 MMOL/L (ref 98–107)
CHOLEST SERPL-MCNC: 137 MG/DL (ref 0–199)
CO2 SERPL-SCNC: 29 MMOL/L (ref 26–30)
CREAT BLD-MCNC: 0.6 MG/DL (ref 0.6–1.3)
DEPRECATED RDW RBC AUTO: 41 FL (ref 37–54)
EOSINOPHIL # BLD AUTO: 0.02 10*3/MM3 (ref 0–0.4)
EOSINOPHIL NFR BLD AUTO: 0.1 % (ref 0.3–6.2)
ERYTHROCYTE [DISTWIDTH] IN BLOOD BY AUTOMATED COUNT: 14.1 % (ref 12.3–15.4)
GFR SERPL CREATININE-BSD FRML MDRD: 138 ML/MIN/1.73
GLOBULIN UR ELPH-MCNC: 2.5 GM/DL
GLUCOSE BLD-MCNC: 221 MG/DL (ref 74–98)
GLUCOSE BLDC GLUCOMTR-MCNC: 130 MG/DL (ref 70–130)
GLUCOSE BLDC GLUCOMTR-MCNC: 217 MG/DL (ref 70–130)
GLUCOSE BLDC GLUCOMTR-MCNC: 90 MG/DL (ref 70–130)
GLUCOSE BLDC GLUCOMTR-MCNC: 92 MG/DL (ref 70–130)
HCT VFR BLD AUTO: 47.1 % (ref 37.5–51)
HDLC SERPL-MCNC: 62 MG/DL (ref 40–60)
HGB BLD-MCNC: 15.6 G/DL (ref 13–17.7)
IMM GRANULOCYTES # BLD AUTO: 0.64 10*3/MM3 (ref 0–0.05)
IMM GRANULOCYTES NFR BLD AUTO: 2.7 % (ref 0–0.5)
LDLC SERPL CALC-MCNC: 60 MG/DL (ref 0–99)
LDLC/HDLC SERPL: 0.96 {RATIO}
LIPASE SERPL-CCNC: 1297 U/L (ref 23–300)
LYMPHOCYTES # BLD AUTO: 0.5 10*3/MM3 (ref 0.7–3.1)
LYMPHOCYTES NFR BLD AUTO: 2.1 % (ref 19.6–45.3)
MCH RBC QN AUTO: 26.4 PG (ref 26.6–33)
MCHC RBC AUTO-ENTMCNC: 33.1 G/DL (ref 31.5–35.7)
MCV RBC AUTO: 79.6 FL (ref 79–97)
MONOCYTES # BLD AUTO: 0.89 10*3/MM3 (ref 0.1–0.9)
MONOCYTES NFR BLD AUTO: 3.7 % (ref 5–12)
NEUTROPHILS # BLD AUTO: 21.79 10*3/MM3 (ref 1.7–7)
NEUTROPHILS NFR BLD AUTO: 91.1 % (ref 42.7–76)
NRBC BLD AUTO-RTO: 0 /100 WBC (ref 0–0.2)
PLATELET # BLD AUTO: 180 10*3/MM3 (ref 140–450)
PMV BLD AUTO: 9.7 FL (ref 6–12)
POTASSIUM BLD-SCNC: 4.5 MMOL/L (ref 3.5–5.1)
PROT SERPL-MCNC: 5.6 G/DL (ref 6.3–8.2)
RBC # BLD AUTO: 5.92 10*6/MM3 (ref 4.14–5.8)
SODIUM BLD-SCNC: 133 MMOL/L (ref 137–145)
TRIGL SERPL-MCNC: 77 MG/DL
VLDLC SERPL-MCNC: 15.4 MG/DL
WBC NRBC COR # BLD: 23.92 10*3/MM3 (ref 3.4–10.8)

## 2019-06-01 PROCEDURE — 63710000001 INSULIN REGULAR HUMAN PER 5 UNITS: Performed by: INTERNAL MEDICINE

## 2019-06-01 PROCEDURE — 94799 UNLISTED PULMONARY SVC/PX: CPT

## 2019-06-01 PROCEDURE — 80053 COMPREHEN METABOLIC PANEL: CPT | Performed by: INTERNAL MEDICINE

## 2019-06-01 PROCEDURE — 71046 X-RAY EXAM CHEST 2 VIEWS: CPT

## 2019-06-01 PROCEDURE — 85025 COMPLETE CBC W/AUTO DIFF WBC: CPT | Performed by: INTERNAL MEDICINE

## 2019-06-01 PROCEDURE — 83690 ASSAY OF LIPASE: CPT | Performed by: INTERNAL MEDICINE

## 2019-06-01 PROCEDURE — 74176 CT ABD & PELVIS W/O CONTRAST: CPT

## 2019-06-01 PROCEDURE — 82962 GLUCOSE BLOOD TEST: CPT

## 2019-06-01 PROCEDURE — 25010000002 ENOXAPARIN PER 10 MG: Performed by: INTERNAL MEDICINE

## 2019-06-01 PROCEDURE — 63710000001 INSULIN ASPART PER 5 UNITS: Performed by: INTERNAL MEDICINE

## 2019-06-01 PROCEDURE — 99233 SBSQ HOSP IP/OBS HIGH 50: CPT | Performed by: INTERNAL MEDICINE

## 2019-06-01 PROCEDURE — 25010000002 METHYLPREDNISOLONE PER 125 MG: Performed by: NURSE PRACTITIONER

## 2019-06-01 PROCEDURE — 80061 LIPID PANEL: CPT | Performed by: INTERNAL MEDICINE

## 2019-06-01 RX ORDER — BISACODYL 10 MG
10 SUPPOSITORY, RECTAL RECTAL DAILY
Status: DISCONTINUED | OUTPATIENT
Start: 2019-06-01 | End: 2019-06-03

## 2019-06-01 RX ORDER — SODIUM CHLORIDE 9 MG/ML
75 INJECTION, SOLUTION INTRAVENOUS CONTINUOUS
Status: DISCONTINUED | OUTPATIENT
Start: 2019-06-01 | End: 2019-06-02

## 2019-06-01 RX ORDER — LACTULOSE 20 G/30ML
20 SOLUTION ORAL DAILY
Status: DISCONTINUED | OUTPATIENT
Start: 2019-06-01 | End: 2019-06-03

## 2019-06-01 RX ADMIN — FLUTICASONE PROPIONATE 1 SPRAY: 50 SPRAY, METERED NASAL at 10:00

## 2019-06-01 RX ADMIN — HUMAN INSULIN 15 UNITS: 100 INJECTION, SOLUTION SUBCUTANEOUS at 12:00

## 2019-06-01 RX ADMIN — HUMAN INSULIN 15 UNITS: 100 INJECTION, SOLUTION SUBCUTANEOUS at 06:59

## 2019-06-01 RX ADMIN — AZELASTINE HYDROCHLORIDE 1 SPRAY: 137 SPRAY, METERED NASAL at 21:49

## 2019-06-01 RX ADMIN — METHYLPREDNISOLONE SODIUM SUCCINATE 60 MG: 125 INJECTION, POWDER, FOR SOLUTION INTRAMUSCULAR; INTRAVENOUS at 22:02

## 2019-06-01 RX ADMIN — BUDESONIDE 1 MG: 0.5 INHALANT RESPIRATORY (INHALATION) at 00:28

## 2019-06-01 RX ADMIN — GABAPENTIN 600 MG: 300 CAPSULE ORAL at 21:48

## 2019-06-01 RX ADMIN — GUAIFENESIN AND DEXTROMETHORPHAN HYDROBROMIDE 1 TABLET: 600; 30 TABLET, EXTENDED RELEASE ORAL at 21:48

## 2019-06-01 RX ADMIN — IPRATROPIUM BROMIDE AND ALBUTEROL SULFATE 3 ML: .5; 3 SOLUTION RESPIRATORY (INHALATION) at 06:40

## 2019-06-01 RX ADMIN — ENOXAPARIN SODIUM 40 MG: 40 INJECTION SUBCUTANEOUS at 18:34

## 2019-06-01 RX ADMIN — METOPROLOL TARTRATE 50 MG: 50 TABLET ORAL at 08:58

## 2019-06-01 RX ADMIN — BISACODYL 10 MG: 10 SUPPOSITORY RECTAL at 10:00

## 2019-06-01 RX ADMIN — IPRATROPIUM BROMIDE AND ALBUTEROL SULFATE 3 ML: .5; 3 SOLUTION RESPIRATORY (INHALATION) at 12:37

## 2019-06-01 RX ADMIN — SODIUM CHLORIDE 75 ML/HR: 9 INJECTION, SOLUTION INTRAVENOUS at 15:56

## 2019-06-01 RX ADMIN — AMITRIPTYLINE HYDROCHLORIDE 75 MG: 25 TABLET, FILM COATED ORAL at 21:49

## 2019-06-01 RX ADMIN — PANTOPRAZOLE SODIUM 40 MG: 40 TABLET, DELAYED RELEASE ORAL at 07:00

## 2019-06-01 RX ADMIN — ENOXAPARIN SODIUM 40 MG: 40 INJECTION SUBCUTANEOUS at 06:59

## 2019-06-01 RX ADMIN — LISINOPRIL 20 MG: 20 TABLET ORAL at 08:58

## 2019-06-01 RX ADMIN — BUDESONIDE 1 MG: 0.5 INHALANT RESPIRATORY (INHALATION) at 06:40

## 2019-06-01 RX ADMIN — GABAPENTIN 600 MG: 300 CAPSULE ORAL at 08:58

## 2019-06-01 RX ADMIN — METHYLPREDNISOLONE SODIUM SUCCINATE 60 MG: 125 INJECTION, POWDER, FOR SOLUTION INTRAMUSCULAR; INTRAVENOUS at 15:56

## 2019-06-01 RX ADMIN — AZELASTINE HYDROCHLORIDE 1 SPRAY: 137 SPRAY, METERED NASAL at 09:00

## 2019-06-01 RX ADMIN — GUAIFENESIN AND DEXTROMETHORPHAN HYDROBROMIDE 1 TABLET: 600; 30 TABLET, EXTENDED RELEASE ORAL at 08:58

## 2019-06-01 RX ADMIN — OXYCODONE HYDROCHLORIDE AND ACETAMINOPHEN 500 MG: 500 TABLET ORAL at 08:58

## 2019-06-01 RX ADMIN — IPRATROPIUM BROMIDE AND ALBUTEROL SULFATE 3 ML: .5; 3 SOLUTION RESPIRATORY (INHALATION) at 00:28

## 2019-06-01 RX ADMIN — INSULIN ASPART 50 UNITS: 100 INJECTION, SOLUTION INTRAVENOUS; SUBCUTANEOUS at 08:00

## 2019-06-01 RX ADMIN — INSULIN ASPART 50 UNITS: 100 INJECTION, SOLUTION INTRAVENOUS; SUBCUTANEOUS at 12:00

## 2019-06-01 RX ADMIN — METOPROLOL TARTRATE 50 MG: 50 TABLET ORAL at 21:49

## 2019-06-01 RX ADMIN — ATORVASTATIN CALCIUM 80 MG: 80 TABLET, FILM COATED ORAL at 21:49

## 2019-06-01 RX ADMIN — LACTULOSE 20 G: 20 SOLUTION ORAL at 13:49

## 2019-06-01 RX ADMIN — INSULIN ASPART 4 UNITS: 100 INJECTION, SOLUTION INTRAVENOUS; SUBCUTANEOUS at 12:00

## 2019-06-01 RX ADMIN — AMLODIPINE BESYLATE 2.5 MG: 5 TABLET ORAL at 08:58

## 2019-06-01 RX ADMIN — CETIRIZINE HYDROCHLORIDE 10 MG: 10 TABLET, FILM COATED ORAL at 21:48

## 2019-06-01 RX ADMIN — SODIUM CHLORIDE, PRESERVATIVE FREE 3 ML: 5 INJECTION INTRAVENOUS at 22:00

## 2019-06-01 RX ADMIN — ALUMINUM HYDROXIDE, MAGNESIUM HYDROXIDE, AND DIMETHICONE 15 ML: 400; 400; 40 SUSPENSION ORAL at 08:57

## 2019-06-01 RX ADMIN — FUROSEMIDE 60 MG: 40 TABLET ORAL at 08:58

## 2019-06-01 RX ADMIN — FLUTICASONE PROPIONATE 1 SPRAY: 50 SPRAY, METERED NASAL at 21:49

## 2019-06-01 RX ADMIN — METHYLPREDNISOLONE SODIUM SUCCINATE 60 MG: 125 INJECTION, POWDER, FOR SOLUTION INTRAMUSCULAR; INTRAVENOUS at 06:58

## 2019-06-01 RX ADMIN — MONTELUKAST 10 MG: 10 TABLET, FILM COATED ORAL at 21:48

## 2019-06-01 RX ADMIN — ASPIRIN 325 MG ORAL TABLET 325 MG: 325 PILL ORAL at 08:58

## 2019-06-01 RX ADMIN — SODIUM CHLORIDE, PRESERVATIVE FREE 3 ML: 5 INJECTION INTRAVENOUS at 09:00

## 2019-06-01 RX ADMIN — RANOLAZINE 500 MG: 500 TABLET, FILM COATED, EXTENDED RELEASE ORAL at 08:58

## 2019-06-01 RX ADMIN — IPRATROPIUM BROMIDE AND ALBUTEROL SULFATE 3 ML: .5; 3 SOLUTION RESPIRATORY (INHALATION) at 18:57

## 2019-06-01 RX ADMIN — RANOLAZINE 500 MG: 500 TABLET, FILM COATED, EXTENDED RELEASE ORAL at 21:48

## 2019-06-01 RX ADMIN — BUDESONIDE 1 MG: 0.5 INHALANT RESPIRATORY (INHALATION) at 15:09

## 2019-06-01 NOTE — PLAN OF CARE
Problem: Patient Care Overview  Goal: Plan of Care Review  Outcome: Ongoing (interventions implemented as appropriate)   05/31/19 6151   Coping/Psychosocial   Plan of Care Reviewed With patient;family   Plan of Care Review   Progress no change     Goal: Individualization and Mutuality  Outcome: Ongoing (interventions implemented as appropriate)    Goal: Discharge Needs Assessment  Outcome: Ongoing (interventions implemented as appropriate)    Goal: Interprofessional Rounds/Family Conf  Outcome: Ongoing (interventions implemented as appropriate)      Problem: Asthma (Adult)  Goal: Signs and Symptoms of Listed Potential Problems Will be Absent, Minimized or Managed (Asthma)  Outcome: Ongoing (interventions implemented as appropriate)      Problem: Breathing Pattern Ineffective (Adult)  Goal: Effective Oxygenation/Ventilation  Outcome: Ongoing (interventions implemented as appropriate)    Goal: Anxiety/Fear Reduction  Outcome: Ongoing (interventions implemented as appropriate)      Problem: Pain, Chronic (Adult)  Goal: Acceptable Pain/Comfort Level and Functional Ability  Outcome: Ongoing (interventions implemented as appropriate)      Problem: Fall Risk (Adult)  Goal: Absence of Fall  Outcome: Ongoing (interventions implemented as appropriate)

## 2019-06-01 NOTE — PROGRESS NOTES
[facility]    [facility]    Name:  Denzel Harding   Age:  59 y.o.  Sex:  male  :  1960  MRN:  [id number]   Visit Number:  [id number]  Admission Date:  2019  Date Of Service:  19  Primary Care Physician:  Ottoniel Toledo MD     LOS: 8 days :  Patient Care Team:  Ottoniel Toledo MD as PCP - General (Family Medicine)  Ottoniel Toledo MD as Referring Physician (Family Medicine):    History taken from:     patient chart    Chief Complaint:      Abdominal distention    Subjective:    Interval History:     Patient seen and examined today.  Reviewed history and physical, lab work, x-rays, chart.    Patient is a 59-year-old male with diabetes mellitus type 2 uncontrolled, severe persistent asthma, obstructive sleep apnea on BiPAP who came in on 2019 with shortness of breath.  He had been following with Dr. Barajas at his office.  He has been getting IM steroids there.  Chest x-ray was negative on admission.    Has been slow to improve.  He has severe dyspnea on exertion.  CT PA protocol was done which showed no pulmonary embolism.  Showed bilateral lower lobe atelectasis.  He is on diuretics.  He has anasarca as well.  He is noted to have leukocytosis, this may be related to steroids.  This morning he is seen and has abdominal distention.  He claims he had a large bowel movement yesterday.  CT abdomen pelvis was done which showed large amount of retained stool representing constipation.  He also has anasarca noted.    He denies any chest pressure, nausea or vomiting.  He is eating okay.  Lipase was noted to be elevated above 1200.  He did claim some mild abdominal pain.  In regards to pain medications he is only receiving Tussionex for cough.    Review of Systems:     All systems were reviewed and negative except for:  Gastrointestinal: positive for  bloating / distention    Objective:    Vital Signs:    Temp:  [97.7 °F (36.5 °C)-99.2 °F (37.3 °C)] 97.8 °F (36.6  °C)  Heart Rate:  [] 72  Resp:  [16-20] 16  BP: (124-160)/(74-94) 124/74    Physical Exam:      General Appearance:    Alert, cooperative, in no acute distress obese   Head:    Normocephalic, without obvious abnormality, atraumatic   Eyes:            Lids and lashes normal, conjunctivae and sclerae normal, no   icterus, no pallor, corneas clear, PERRLA   Ears:    Ears appear intact with no abnormalities noted   Throat:   No oral lesions, no thrush, oral mucosa moist   Neck:   No adenopathy, supple, trachea midline, no thyromegaly, no   carotid bruit, no JVD   Back:     No kyphosis present, no scoliosis present, no skin lesions,      erythema or scars, no tenderness to percussion or                   palpation,   range of motion normal   Lungs:     Clear to auscultation,respirations regular, even and                  unlabored    Heart:    Regular rhythm and normal rate, normal S1 and S2, no            murmur, no gallop, no rub, no click   Chest Wall:    No abnormalities observed   Abdomen:    Decreased bowel sounds, no masses, no organomegaly, soft        mildly tender, significant distention, no guarding, no rebound   tenderness   Rectal:     Deferred   Extremities:   Moves all extremities well, no edema, no cyanosis, no             redness   Pulses:   Pulses palpable and equal bilaterally   Skin:   No bleeding, bruising or rash   Lymph nodes:   No palpable adenopathy   Neurologic:   Cranial nerves 2 - 12 grossly intact, sensation intact, DTR       present and equal bilaterally        Results Review:      I reviewed the patient's new clinical results.    Labs:    Lab Results (last 24 hours)     Procedure Component Value Units Date/Time    POC Glucose Once [[id number]]  (Abnormal) Collected:  06/01/19 1047    Specimen:  Blood Updated:  06/01/19 1056     Glucose 217 mg/dL      Comment: Serial Number: [id number]:  [id number]       Comprehensive Metabolic Panel [[id number]]  (Abnormal) Collected:  06/01/19 0948     Specimen:  Blood Updated:  06/01/19 1022     Glucose 221 mg/dL      Comment: Glucose >180, Hemoglobin A1C recommended.        BUN 31 mg/dL      Creatinine 0.60 mg/dL      Sodium 133 mmol/L      Potassium 4.5 mmol/L      Chloride 95 mmol/L      CO2 29.0 mmol/L      Calcium 7.8 mg/dL      Total Protein 5.6 g/dL      Albumin 3.10 g/dL      ALT (SGPT) 105 U/L      AST (SGOT) 58 U/L      Alkaline Phosphatase 68 U/L      Total Bilirubin 1.0 mg/dL      eGFR Non African Amer 138 mL/min/1.73      Globulin 2.5 gm/dL      A/G Ratio 1.2 g/dL      BUN/Creatinine Ratio 51.7     Anion Gap 13.5 mmol/L     Narrative:       GFR Normal >60  Chronic Kidney Disease <60  Kidney Failure <15    Lipase [[id number]]  (Abnormal) Collected:  06/01/19 0948    Specimen:  Blood Updated:  06/01/19 1005     Lipase 1,297 U/L     CBC & Differential [[id number]] Collected:  06/01/19 0948    Specimen:  Blood Updated:  06/01/19 0955    Narrative:       The following orders were created for panel order CBC & Differential.  Procedure                               Abnormality         Status                     ---------                               -----------         ------                     CBC Auto Differential[[id number]]        Abnormal            Final result                 Please view results for these tests on the individual orders.    CBC Auto Differential [[id number]]  (Abnormal) Collected:  06/01/19 0948    Specimen:  Blood Updated:  06/01/19 0955     WBC 23.92 10*3/mm3      RBC 5.92 10*6/mm3      Hemoglobin 15.6 g/dL      Hematocrit 47.1 %      MCV 79.6 fL      MCH 26.4 pg      MCHC 33.1 g/dL      RDW 14.1 %      RDW-SD 41.0 fl      MPV 9.7 fL      Platelets 180 10*3/mm3      Neutrophil % 91.1 %      Lymphocyte % 2.1 %      Monocyte % 3.7 %      Eosinophil % 0.1 %      Basophil % 0.3 %      Immature Grans % 2.7 %      Neutrophils, Absolute 21.79 10*3/mm3      Lymphocytes, Absolute 0.50 10*3/mm3      Monocytes, Absolute 0.89 10*3/mm3       Eosinophils, Absolute 0.02 10*3/mm3      Basophils, Absolute 0.08 10*3/mm3      Immature Grans, Absolute 0.64 10*3/mm3      nRBC 0.0 /100 WBC     POC Glucose Once [[id number]]  (Normal) Collected:  06/01/19 0615    Specimen:  Blood Updated:  06/01/19 0630     Glucose 130 mg/dL      Comment: Serial Number: [id number]:  [id number]       POC Glucose Once [[id number]]  (Normal) Collected:  05/31/19 2315    Specimen:  Blood Updated:  05/31/19 2320     Glucose 102 mg/dL      Comment: Serial Number: [id number]:  [id number]       POC Glucose Once [[id number]]  (Abnormal) Collected:  05/31/19 1959    Specimen:  Blood Updated:  05/31/19 2015     Glucose 168 mg/dL      Comment: Serial Number: [id number]:  [id number]       POC Glucose Once [[id number]]  (Abnormal) Collected:  05/31/19 1639    Specimen:  Blood Updated:  05/31/19 1645     Glucose 177 mg/dL      Comment: Serial Number: [id number]:  [id number]              Radiology:    Imaging Results (last 24 hours)     Procedure Component Value Units Date/Time    CT Abdomen Pelvis Without Contrast [[id number]] Collected:  06/01/19 1212     Updated:  06/01/19 1213    Narrative:       FINAL REPORT    CLINICAL HISTORY:  Abdominal pain, unspecified    FINDINGS:  Axial CT images of the abdomen and pelvis were obtained without  intravenous contrast. Coronal reformatted images were also  obtained.This study was performed with techniques to keep  radiation doses as low as reasonably achievable (ALARA).  Individualized dose reduction techniques using automated  exposure control or adjustment of mA and/or kV according to the  patient''s size were employed.  Abdomen: There is mild bibasilar  atelectasis.  There are small bilateral pleural effusions.  There is no evidence of renal stone or hydronephrosis.  Postoperative changes are seen from cholecystectomy.  Anasarca  is noted.  There are moderate vascular calcifications.  A large  amount of retained stool is present.  The  liver, spleen and  pancreas have an unremarkable, unenhanced appearance. No mass or  adenopathy is seen. No inflammatory process is identified.  Pelvis: Images of the pelvis reveal no evidence of ureteral  dilation or ureteral stone.No mass or abnormal fluid collection  is identified.  The appendix has an unremarkable appearance.      Impression:       No renal or ureteral stone, or hydronephrosis.  Anasarca.  Large  amount of retained stool may represent constipation.    Authenticated by Rohit Kerr III, MD on 06/01/2019  12:12:29 PM    XR Chest PA & Lateral [[id number]] Collected:  06/01/19 0843     Updated:  06/01/19 0847    Narrative:       PROCEDURE: XR CHEST PA AND LATERAL-     HISTORY: dypnea, pleural effusion; Z74.09-Other reduced mobility;  Z74.09-Other reduced mobility        COMPARISON: 05/28/2019.      FINDINGS: Cardiomegaly is noted. Post operative changes are seen from  median sternotomy. No acute pulmonary abnormality is identified. There  is a stable small left pleural effusion or pleural thickening. There is  no pneumothorax. The bony thorax is intact.       Impression:       Cardiomegaly and postoperative changes.     Small left effusion or pleural thickening, stable..           This report was finalized on 6/1/2019 8:45 AM by Rohit Kerr MD.          Medication Review:       amitriptyline 75 mg Oral Nightly   amLODIPine 2.5 mg Oral Daily   aspirin 325 mg Oral Daily   atorvastatin 80 mg Oral Nightly   azelastine 1 spray Each Nare BID   bisacodyl 10 mg Rectal Daily   Bromocriptine Mesylate 3.2 mg Oral QAM AC   budesonide 1 mg Nebulization Q8H   cetirizine 10 mg Oral Nightly   enoxaparin 40 mg Subcutaneous Q12H   fluticasone 1 spray Each Nare BID   furosemide 60 mg Oral Daily   gabapentin 600 mg Oral Q12H   guaifenesin-dextromethorphan 1 tablet Oral Q12H   insulin aspart 0-24 Units Subcutaneous 4x Daily AC & at Bedtime   insulin aspart 50 Units Subcutaneous TID With Meals   Insulin Glargine  160 Units Subcutaneous Nightly   insulin regular 15 Units Subcutaneous Q6H   ipratropium-albuterol 3 mL Nebulization Q6H - RT   lactulose 20 g Oral Daily   Liraglutide 0.6 mg Subcutaneous Nightly   lisinopril 20 mg Oral Daily   methylPREDNISolone sodium succinate 60 mg Intravenous Q8H   metoprolol tartrate 50 mg Oral BID   montelukast 10 mg Oral Nightly   pantoprazole 40 mg Oral QAM   ranolazine 500 mg Oral BID   sodium chloride 3 mL Intravenous Q12H   vitamin C 500 mg Oral Daily   Vortioxetine HBr 20 mg Oral Daily            Assessment:    Problem List Items Addressed This Visit     None      Visit Diagnoses     Impaired mobility and ADLs    -  Primary    Impaired functional mobility, balance, gait, and endurance              1.  Acute asthma exacerbation with allergic rhinitis present on admission-patient is on IV steroids, bronchodilators.      2.  Diabetes mellitus type 2-uncontrolled     3.  Constipation with large amount of retained stool     4.  Leukocytosis-  Likely steroid related however given that he does have lymphopenia, I have ordered a peripheral smear     5.  Lymphopenia-  Peripheral smear ordered.       6.  COPD with no evidence of exacerbation     7.  Obstructive sleep apnea on BiPAP     8.  Chronic essential hypertension-blood pressure is stable continue to monitor     9.  Chronic lower back pain with opioid use     10.  Seizure disorder-seizure precautions     11.  Coronary artery disease status post CABG in the past-medical management  12.  Likely obesity hypoventilation syndrome  13.  Elevated lipase, consistent with pancreatitis.        Plan:    Patient will be placed on IV fluids.  Did order CT abdomen pelvis that he was distended.  Shows large amount of stool.  Will give lactulose, enema, Dulcolax suppository.  Place him on clear liquids.  Check lab work in the a.m.  Recheck lipase in the a.m.  Continue with Pulmicort, Flonase, Lasix, DuoNeb, Solu-Medrol, Singulair.  Dr. Barajas is following.   Lovenox for DVT prophylaxis.  Further recommendations will depend on the clinical course.    Blane Mohan DO  06/01/19  12:46 PM

## 2019-06-02 ENCOUNTER — APPOINTMENT (OUTPATIENT)
Dept: GENERAL RADIOLOGY | Facility: HOSPITAL | Age: 59
End: 2019-06-02

## 2019-06-02 LAB
ALBUMIN SERPL-MCNC: 2.9 G/DL (ref 3.5–5)
ALBUMIN/GLOB SERPL: 1.2 G/DL (ref 1–2)
ALP SERPL-CCNC: 58 U/L (ref 38–126)
ALT SERPL W P-5'-P-CCNC: 114 U/L (ref 13–69)
ANION GAP SERPL CALCULATED.3IONS-SCNC: 10.3 MMOL/L (ref 10–20)
AST SERPL-CCNC: 50 U/L (ref 15–46)
BILIRUB SERPL-MCNC: 0.9 MG/DL (ref 0.2–1.3)
BUN BLD-MCNC: 26 MG/DL (ref 7–20)
BUN/CREAT SERPL: 43.3 (ref 6.3–21.9)
CALCIUM SPEC-SCNC: 7.6 MG/DL (ref 8.4–10.2)
CHLORIDE SERPL-SCNC: 97 MMOL/L (ref 98–107)
CO2 SERPL-SCNC: 30 MMOL/L (ref 26–30)
CREAT BLD-MCNC: 0.6 MG/DL (ref 0.6–1.3)
DEPRECATED RDW RBC AUTO: 41.4 FL (ref 37–54)
ERYTHROCYTE [DISTWIDTH] IN BLOOD BY AUTOMATED COUNT: 14.1 % (ref 12.3–15.4)
GFR SERPL CREATININE-BSD FRML MDRD: 138 ML/MIN/1.73
GLOBULIN UR ELPH-MCNC: 2.5 GM/DL
GLUCOSE BLD-MCNC: 79 MG/DL (ref 74–98)
GLUCOSE BLDC GLUCOMTR-MCNC: 170 MG/DL (ref 70–130)
GLUCOSE BLDC GLUCOMTR-MCNC: 225 MG/DL (ref 70–130)
GLUCOSE BLDC GLUCOMTR-MCNC: 232 MG/DL (ref 70–130)
GLUCOSE BLDC GLUCOMTR-MCNC: 92 MG/DL (ref 70–130)
HCT VFR BLD AUTO: 45.3 % (ref 37.5–51)
HGB BLD-MCNC: 14.9 G/DL (ref 13–17.7)
LIPASE SERPL-CCNC: 340 U/L (ref 23–300)
MAGNESIUM SERPL-MCNC: 2.9 MG/DL (ref 1.6–2.3)
MAXIMAL PREDICTED HEART RATE: 162 BPM
MCH RBC QN AUTO: 26.3 PG (ref 26.6–33)
MCHC RBC AUTO-ENTMCNC: 32.9 G/DL (ref 31.5–35.7)
MCV RBC AUTO: 80 FL (ref 79–97)
NT-PROBNP SERPL-MCNC: 89.4 PG/ML (ref 0–125)
PLATELET # BLD AUTO: 143 10*3/MM3 (ref 140–450)
PMV BLD AUTO: 9.6 FL (ref 6–12)
POTASSIUM BLD-SCNC: 4.3 MMOL/L (ref 3.5–5.1)
PROT SERPL-MCNC: 5.4 G/DL (ref 6.3–8.2)
RBC # BLD AUTO: 5.66 10*6/MM3 (ref 4.14–5.8)
SODIUM BLD-SCNC: 133 MMOL/L (ref 137–145)
STRESS TARGET HR: 138 BPM
WBC NRBC COR # BLD: 17.8 10*3/MM3 (ref 3.4–10.8)

## 2019-06-02 PROCEDURE — 83690 ASSAY OF LIPASE: CPT | Performed by: INTERNAL MEDICINE

## 2019-06-02 PROCEDURE — 94799 UNLISTED PULMONARY SVC/PX: CPT

## 2019-06-02 PROCEDURE — 25010000002 METHYLPREDNISOLONE PER 125 MG: Performed by: NURSE PRACTITIONER

## 2019-06-02 PROCEDURE — 63710000001 INSULIN REGULAR HUMAN PER 5 UNITS: Performed by: INTERNAL MEDICINE

## 2019-06-02 PROCEDURE — 83880 ASSAY OF NATRIURETIC PEPTIDE: CPT | Performed by: INTERNAL MEDICINE

## 2019-06-02 PROCEDURE — 99232 SBSQ HOSP IP/OBS MODERATE 35: CPT | Performed by: INTERNAL MEDICINE

## 2019-06-02 PROCEDURE — 82962 GLUCOSE BLOOD TEST: CPT

## 2019-06-02 PROCEDURE — 94660 CPAP INITIATION&MGMT: CPT

## 2019-06-02 PROCEDURE — 74018 RADEX ABDOMEN 1 VIEW: CPT

## 2019-06-02 PROCEDURE — 25010000002 METHYLPREDNISOLONE PER 40 MG: Performed by: INTERNAL MEDICINE

## 2019-06-02 PROCEDURE — 83735 ASSAY OF MAGNESIUM: CPT | Performed by: INTERNAL MEDICINE

## 2019-06-02 PROCEDURE — 80053 COMPREHEN METABOLIC PANEL: CPT | Performed by: INTERNAL MEDICINE

## 2019-06-02 PROCEDURE — 63710000001 INSULIN ASPART PER 5 UNITS: Performed by: INTERNAL MEDICINE

## 2019-06-02 PROCEDURE — 85027 COMPLETE CBC AUTOMATED: CPT | Performed by: INTERNAL MEDICINE

## 2019-06-02 PROCEDURE — 80074 ACUTE HEPATITIS PANEL: CPT | Performed by: INTERNAL MEDICINE

## 2019-06-02 PROCEDURE — 25010000002 ENOXAPARIN PER 10 MG: Performed by: INTERNAL MEDICINE

## 2019-06-02 RX ORDER — METHYLPREDNISOLONE SODIUM SUCCINATE 40 MG/ML
40 INJECTION, POWDER, LYOPHILIZED, FOR SOLUTION INTRAMUSCULAR; INTRAVENOUS EVERY 8 HOURS
Status: DISCONTINUED | OUTPATIENT
Start: 2019-06-02 | End: 2019-06-03

## 2019-06-02 RX ORDER — BUDESONIDE 0.5 MG/2ML
1 INHALANT ORAL
Status: DISCONTINUED | OUTPATIENT
Start: 2019-06-02 | End: 2019-06-05 | Stop reason: HOSPADM

## 2019-06-02 RX ORDER — SIMETHICONE 80 MG
80 TABLET,CHEWABLE ORAL 4 TIMES DAILY PRN
Status: DISCONTINUED | OUTPATIENT
Start: 2019-06-02 | End: 2019-06-05 | Stop reason: HOSPADM

## 2019-06-02 RX ADMIN — SODIUM CHLORIDE 75 ML/HR: 9 INJECTION, SOLUTION INTRAVENOUS at 04:56

## 2019-06-02 RX ADMIN — ENOXAPARIN SODIUM 40 MG: 40 INJECTION SUBCUTANEOUS at 06:35

## 2019-06-02 RX ADMIN — LACTULOSE 20 G: 20 SOLUTION ORAL at 09:13

## 2019-06-02 RX ADMIN — INSULIN ASPART 4 UNITS: 100 INJECTION, SOLUTION INTRAVENOUS; SUBCUTANEOUS at 12:15

## 2019-06-02 RX ADMIN — IPRATROPIUM BROMIDE AND ALBUTEROL SULFATE 3 ML: .5; 3 SOLUTION RESPIRATORY (INHALATION) at 00:02

## 2019-06-02 RX ADMIN — IPRATROPIUM BROMIDE AND ALBUTEROL SULFATE 3 ML: .5; 3 SOLUTION RESPIRATORY (INHALATION) at 19:19

## 2019-06-02 RX ADMIN — MONTELUKAST 10 MG: 10 TABLET, FILM COATED ORAL at 22:14

## 2019-06-02 RX ADMIN — IPRATROPIUM BROMIDE AND ALBUTEROL SULFATE 3 ML: .5; 3 SOLUTION RESPIRATORY (INHALATION) at 13:18

## 2019-06-02 RX ADMIN — FLUTICASONE PROPIONATE 1 SPRAY: 50 SPRAY, METERED NASAL at 22:12

## 2019-06-02 RX ADMIN — AZELASTINE HYDROCHLORIDE 1 SPRAY: 137 SPRAY, METERED NASAL at 22:12

## 2019-06-02 RX ADMIN — METHYLPREDNISOLONE SODIUM SUCCINATE 40 MG: 40 INJECTION, POWDER, FOR SOLUTION INTRAMUSCULAR; INTRAVENOUS at 22:15

## 2019-06-02 RX ADMIN — HUMAN INSULIN 10 UNITS: 100 INJECTION, SOLUTION SUBCUTANEOUS at 22:13

## 2019-06-02 RX ADMIN — BUDESONIDE 1 MG: 0.5 INHALANT RESPIRATORY (INHALATION) at 08:13

## 2019-06-02 RX ADMIN — ENOXAPARIN SODIUM 40 MG: 40 INJECTION SUBCUTANEOUS at 17:57

## 2019-06-02 RX ADMIN — GABAPENTIN 600 MG: 300 CAPSULE ORAL at 22:14

## 2019-06-02 RX ADMIN — BUDESONIDE 1 MG: 0.5 INHALANT RESPIRATORY (INHALATION) at 19:19

## 2019-06-02 RX ADMIN — ASPIRIN 325 MG ORAL TABLET 325 MG: 325 PILL ORAL at 09:14

## 2019-06-02 RX ADMIN — METOPROLOL TARTRATE 50 MG: 50 TABLET ORAL at 09:14

## 2019-06-02 RX ADMIN — LISINOPRIL 20 MG: 20 TABLET ORAL at 09:14

## 2019-06-02 RX ADMIN — METOPROLOL TARTRATE 50 MG: 50 TABLET ORAL at 22:14

## 2019-06-02 RX ADMIN — INSULIN ASPART 8 UNITS: 100 INJECTION, SOLUTION INTRAVENOUS; SUBCUTANEOUS at 17:57

## 2019-06-02 RX ADMIN — CETIRIZINE HYDROCHLORIDE 10 MG: 10 TABLET, FILM COATED ORAL at 22:15

## 2019-06-02 RX ADMIN — OXYCODONE HYDROCHLORIDE AND ACETAMINOPHEN 500 MG: 500 TABLET ORAL at 09:14

## 2019-06-02 RX ADMIN — ALUMINUM HYDROXIDE, MAGNESIUM HYDROXIDE, AND DIMETHICONE 15 ML: 400; 400; 40 SUSPENSION ORAL at 09:43

## 2019-06-02 RX ADMIN — GUAIFENESIN AND DEXTROMETHORPHAN HYDROBROMIDE 1 TABLET: 600; 30 TABLET, EXTENDED RELEASE ORAL at 09:15

## 2019-06-02 RX ADMIN — RANOLAZINE 500 MG: 500 TABLET, FILM COATED, EXTENDED RELEASE ORAL at 22:15

## 2019-06-02 RX ADMIN — GABAPENTIN 600 MG: 300 CAPSULE ORAL at 09:15

## 2019-06-02 RX ADMIN — PANTOPRAZOLE SODIUM 40 MG: 40 TABLET, DELAYED RELEASE ORAL at 06:35

## 2019-06-02 RX ADMIN — AZELASTINE HYDROCHLORIDE 1 SPRAY: 137 SPRAY, METERED NASAL at 09:12

## 2019-06-02 RX ADMIN — METHYLPREDNISOLONE SODIUM SUCCINATE 60 MG: 125 INJECTION, POWDER, FOR SOLUTION INTRAMUSCULAR; INTRAVENOUS at 06:35

## 2019-06-02 RX ADMIN — GUAIFENESIN AND DEXTROMETHORPHAN HYDROBROMIDE 1 TABLET: 600; 30 TABLET, EXTENDED RELEASE ORAL at 22:15

## 2019-06-02 RX ADMIN — ATORVASTATIN CALCIUM 80 MG: 80 TABLET, FILM COATED ORAL at 22:14

## 2019-06-02 RX ADMIN — AMITRIPTYLINE HYDROCHLORIDE 75 MG: 25 TABLET, FILM COATED ORAL at 22:15

## 2019-06-02 RX ADMIN — FUROSEMIDE 60 MG: 40 TABLET ORAL at 09:16

## 2019-06-02 RX ADMIN — RANOLAZINE 500 MG: 500 TABLET, FILM COATED, EXTENDED RELEASE ORAL at 09:15

## 2019-06-02 RX ADMIN — BUDESONIDE 1 MG: 0.5 INHALANT RESPIRATORY (INHALATION) at 00:02

## 2019-06-02 RX ADMIN — METHYLPREDNISOLONE SODIUM SUCCINATE 40 MG: 40 INJECTION, POWDER, FOR SOLUTION INTRAMUSCULAR; INTRAVENOUS at 14:15

## 2019-06-02 RX ADMIN — IPRATROPIUM BROMIDE AND ALBUTEROL SULFATE 3 ML: .5; 3 SOLUTION RESPIRATORY (INHALATION) at 08:13

## 2019-06-02 RX ADMIN — INSULIN ASPART 8 UNITS: 100 INJECTION, SOLUTION INTRAVENOUS; SUBCUTANEOUS at 22:13

## 2019-06-02 RX ADMIN — SIMETHICONE CHEW TAB 80 MG 80 MG: 80 TABLET ORAL at 12:14

## 2019-06-02 RX ADMIN — SIMETHICONE CHEW TAB 80 MG 80 MG: 80 TABLET ORAL at 22:12

## 2019-06-02 RX ADMIN — AMLODIPINE BESYLATE 2.5 MG: 5 TABLET ORAL at 09:15

## 2019-06-02 RX ADMIN — SODIUM CHLORIDE, PRESERVATIVE FREE 3 ML: 5 INJECTION INTRAVENOUS at 22:16

## 2019-06-02 NOTE — PROGRESS NOTES
[facility]    Name:  Denzel Harding   Age:  59 y.o.  Sex:  male  :  1960  MRN:  [id number]   Visit Number:  [id number]  Admission Date:  2019  Date Of Service:  19  Primary Care Physician:  Ottoniel Toledo MD     LOS: 9 days :  Patient Care Team:  Ottoniel Toledo MD as PCP - General (Family Medicine)  Ottoniel Toledo MD as Referring Physician (Family Medicine):    History taken from:     patient chart    Chief Complaint:      Abdominal distention    Subjective:    Interval History:     Patient seen and examined again today.    Patient is a 59-year-old male with diabetes mellitus type 2 uncontrolled, severe persistent asthma, obstructive sleep apnea on BiPAP who came in on 2019 with shortness of breath.  He had been following with Dr. Barajas at his office.  He has been getting IM steroids there.  Chest x-ray was negative on admission.    Has been slow to improve.  He has severe dyspnea on exertion.  CT PA protocol was done which showed no pulmonary embolism.  Showed bilateral lower lobe atelectasis.  He is on diuretics.  He has anasarca as well.  He is noted to have leukocytosis, this may be related to steroids.  This morning he is seen and has abdominal distention.  He claims he had a large bowel movement yesterday.  CT abdomen pelvis was done which showed large amount of retained stool representing constipation.  He also has anasarca noted.    Patient was noted to have large amount of retained stool on CT, he was given lactulose, enema, Dulcolax, he has had multiple bowel movements.  Repeat abdominal film still shows some stool in the colon.  Patient complains of abdominal pain from his xiphoid process down to his umbilicus.  He denies any nausea or vomiting.  On review of his lab work his WBC is improved and his lipase is improved.  Blood sugars have lowered as well.    Review of Systems:     All systems were reviewed and negative except for:   Gastrointestinal: positive for  bloating / distention    Objective:    Vital Signs:    Temp:  [97.3 °F (36.3 °C)-98 °F (36.7 °C)] 97.3 °F (36.3 °C)  Heart Rate:  [67-90] 88  Resp:  [14-18] 18  BP: (126-151)/(76-91) 132/83    Physical Exam:      General Appearance:    Alert, cooperative, in no acute distress obese   Head:    Normocephalic, without obvious abnormality, atraumatic   Eyes:            Lids and lashes normal, conjunctivae and sclerae normal, no   icterus, no pallor, corneas clear, PERRLA   Ears:    Ears appear intact with no abnormalities noted   Throat:   No oral lesions, no thrush, oral mucosa moist   Neck:   No adenopathy, supple, trachea midline, no thyromegaly, no   carotid bruit, no JVD   Back:     No kyphosis present, no scoliosis present, no skin lesions,      erythema or scars, no tenderness to percussion or                   palpation,   range of motion normal   Lungs:     Clear to auscultation,respirations regular, even and                  unlabored    Heart:    Regular rhythm and normal rate, normal S1 and S2, no            murmur, no gallop, no rub, no click   Chest Wall:    No abnormalities observed   Abdomen:    Decreased bowel sounds, no masses, no organomegaly, soft        mildly tender, mild distention, no guarding, no rebound   tenderness   Rectal:     Deferred   Extremities:   Moves all extremities well, no edema, no cyanosis, no             redness   Pulses:   Pulses palpable and equal bilaterally   Skin:   No bleeding, bruising or rash   Lymph nodes:   No palpable adenopathy   Neurologic:   Cranial nerves 2 - 12 grossly intact, sensation intact, DTR       present and equal bilaterally        Results Review:      I reviewed the patient's new clinical results.    Labs:    Lab Results (last 24 hours)     Procedure Component Value Units Date/Time    POC Glucose Once [[id number]]  (Abnormal) Collected:  06/02/19 1050    Specimen:  Blood Updated:  06/02/19 1126     Glucose 170 mg/dL       Comment: Serial Number: [id number]:  [id number]       POC Glucose Once [[id number]]  (Normal) Collected:  06/02/19 0633    Specimen:  Blood Updated:  06/02/19 0645     Glucose 92 mg/dL      Comment: Serial Number: [id number]:  [id number]       Comprehensive Metabolic Panel [[id number]]  (Abnormal) Collected:  06/02/19 0603    Specimen:  Blood Updated:  06/02/19 0630     Glucose 79 mg/dL      BUN 26 mg/dL      Creatinine 0.60 mg/dL      Sodium 133 mmol/L      Potassium 4.3 mmol/L      Chloride 97 mmol/L      CO2 30.0 mmol/L      Calcium 7.6 mg/dL      Total Protein 5.4 g/dL      Albumin 2.90 g/dL      ALT (SGPT) 114 U/L      AST (SGOT) 50 U/L      Alkaline Phosphatase 58 U/L      Total Bilirubin 0.9 mg/dL      eGFR Non African Amer 138 mL/min/1.73      Globulin 2.5 gm/dL      A/G Ratio 1.2 g/dL      BUN/Creatinine Ratio 43.3     Anion Gap 10.3 mmol/L     Narrative:       GFR Normal >60  Chronic Kidney Disease <60  Kidney Failure <15    Lipase [[id number]]  (Abnormal) Collected:  06/02/19 0603    Specimen:  Blood Updated:  06/02/19 0628     Lipase 340 U/L     Magnesium [[id number]]  (Abnormal) Collected:  06/02/19 0603    Specimen:  Blood Updated:  06/02/19 0628     Magnesium 2.9 mg/dL     CBC (No Diff) [[id number]]  (Abnormal) Collected:  06/02/19 0603    Specimen:  Blood Updated:  06/02/19 0616     WBC 17.80 10*3/mm3      RBC 5.66 10*6/mm3      Hemoglobin 14.9 g/dL      Hematocrit 45.3 %      MCV 80.0 fL      MCH 26.3 pg      MCHC 32.9 g/dL      RDW 14.1 %      RDW-SD 41.4 fl      MPV 9.6 fL      Platelets 143 10*3/mm3     POC Glucose Once [[id number]]  (Normal) Collected:  06/01/19 2011    Specimen:  Blood Updated:  06/01/19 2036     Glucose 92 mg/dL      Comment: Serial Number: [id number]:  [id number]       POC Glucose Once [[id number]]  (Normal) Collected:  06/01/19 1639    Specimen:  Blood Updated:  06/01/19 1645     Glucose 90 mg/dL      Comment: Serial Number: [id number]:  [id number]       Lipid Panel  [[id number]]  (Abnormal) Collected:  06/01/19 0948    Specimen:  Blood Updated:  06/01/19 1428     Total Cholesterol 137 mg/dL      Triglycerides 77 mg/dL      HDL Cholesterol 62 mg/dL      LDL Cholesterol  60 mg/dL      VLDL Cholesterol 15.4 mg/dL      LDL/HDL Ratio 0.96    Narrative:       Reference ranges for triglycerides, VLDL cholesterol, LDL cholesterol, and HDL cholesterol are not true population normal ranges but are threshold levels for increased risk of coronary artery disease established by ATP III guidelines from the National Cholesterol Education Program.           Radiology:    Imaging Results (last 24 hours)     Procedure Component Value Units Date/Time    XR Abdomen KUB [[id number]] Collected:  06/02/19 1009     Updated:  06/02/19 1012    Narrative:       PROCEDURE: XR ABDOMEN KUB-     HISTORY: abd pain; Z74.09-Other reduced mobility; Z74.09-Other reduced  mobility        FINDINGS: A single supine view of the abdomen was obtained. There is a  nonobstructive bowel gas pattern. No abnormal calcification is  identified. The bony structures are intact, with no acute abnormality  identified.        Impression:       No acute abdominal process identified.      This report was finalized on 6/2/2019 10:10 AM by Rohit Kerr MD.          Medication Review:       amitriptyline 75 mg Oral Nightly   amLODIPine 2.5 mg Oral Daily   aspirin 325 mg Oral Daily   atorvastatin 80 mg Oral Nightly   azelastine 1 spray Each Nare BID   bisacodyl 10 mg Rectal Daily   Bromocriptine Mesylate 3.2 mg Oral QAM AC   budesonide 1 mg Nebulization Q8H   cetirizine 10 mg Oral Nightly   enoxaparin 40 mg Subcutaneous Q12H   fluticasone 1 spray Each Nare BID   furosemide 60 mg Oral Daily   gabapentin 600 mg Oral Q12H   guaifenesin-dextromethorphan 1 tablet Oral Q12H   insulin aspart 0-24 Units Subcutaneous 4x Daily AC & at Bedtime   insulin aspart 50 Units Subcutaneous TID With Meals   Insulin Glargine 160 Units Subcutaneous Nightly    insulin regular 15 Units Subcutaneous Q6H   ipratropium-albuterol 3 mL Nebulization Q6H - RT   lactulose 20 g Oral Daily   Liraglutide 0.6 mg Subcutaneous Nightly   lisinopril 20 mg Oral Daily   methylPREDNISolone sodium succinate 60 mg Intravenous Q8H   metoprolol tartrate 50 mg Oral BID   montelukast 10 mg Oral Nightly   pantoprazole 40 mg Oral QAM   ranolazine 500 mg Oral BID   sodium chloride 3 mL Intravenous Q12H   vitamin C 500 mg Oral Daily   Vortioxetine HBr 20 mg Oral Daily         sodium chloride 75 mL/hr Last Rate: 75 mL/hr (06/02/19 1680)       Assessment:    Problem List Items Addressed This Visit     None      Visit Diagnoses     Impaired mobility and ADLs    -  Primary    Impaired functional mobility, balance, gait, and endurance              1.  Acute asthma exacerbation with allergic rhinitis present on admission-patient is on IV steroids, bronchodilators.      2.  Diabetes mellitus type 2-uncontrolled     3.  Constipation with large amount of retained stool     4.  Leukocytosis-  Likely steroid related however given that he does have lymphopenia, full smear has been ordered.  This appears to be improving.     5.  Lymphopenia-  Peripheral smear ordered.       6.  COPD with no evidence of exacerbation     7.  Obstructive sleep apnea on BiPAP     8.  Chronic essential hypertension-blood pressure is stable continue to monitor     9.  Chronic lower back pain with opioid use     10.  Seizure disorder-seizure precautions     11.  Coronary artery disease status post CABG in the past-medical management  12.  Likely obesity hypoventilation syndrome  13.  Elevated lipase, consistent with pancreatitis.  This is improved.  14.  Significant constipation        Plan:    Patient will be continued on IV fluids.  Repeat abdominal film reviewed by myself.  Shows continued moderate amount of stool.  Will continue lactulose and Dulcolax.  Continue clear liquids.  Check lab work in the a.m.  Recheck lipase in the a.m.   Continue with Pulmicort, Flonase, Lasix, DuoNeb, Solu-Medrol, Singulair.  Dr. Barajas is following.  Lovenox for DVT prophylaxis.  Further recommendations will depend on the clinical course.    Blane Mohan DO  06/02/19  12:42 PM

## 2019-06-02 NOTE — PLAN OF CARE
Problem: Patient Care Overview  Goal: Plan of Care Review  Outcome: Ongoing (interventions implemented as appropriate)   06/02/19 3732   Coping/Psychosocial   Plan of Care Reviewed With patient   Plan of Care Review   Progress improving   OTHER   Outcome Summary Abdominal pain continues. Given simethicone with relief. Told to mobilize in cox also to relieve gas pains. Patient not seen in hallway as of yet. No family in to see patient. Awaiting stool sample to send for occult blood.       Problem: Asthma (Adult)  Goal: Signs and Symptoms of Listed Potential Problems Will be Absent, Minimized or Managed (Asthma)  Outcome: Ongoing (interventions implemented as appropriate)      Problem: Breathing Pattern Ineffective (Adult)  Goal: Effective Oxygenation/Ventilation  Outcome: Ongoing (interventions implemented as appropriate)    Goal: Anxiety/Fear Reduction  Outcome: Ongoing (interventions implemented as appropriate)      Problem: Pain, Chronic (Adult)  Goal: Acceptable Pain/Comfort Level and Functional Ability  Outcome: Ongoing (interventions implemented as appropriate)      Problem: Fall Risk (Adult)  Goal: Absence of Fall  Outcome: Ongoing (interventions implemented as appropriate)

## 2019-06-02 NOTE — PLAN OF CARE
Problem: Patient Care Overview  Goal: Plan of Care Review  Outcome: Ongoing (interventions implemented as appropriate)   06/02/19 0410   Coping/Psychosocial   Plan of Care Reviewed With patient   Plan of Care Review   Progress no change       Problem: Asthma (Adult)  Goal: Signs and Symptoms of Listed Potential Problems Will be Absent, Minimized or Managed (Asthma)  Outcome: Ongoing (interventions implemented as appropriate)   06/02/19 0410   Goal/Outcome Evaluation   Problems Assessed (Asthma) all   Problems Present (Asthma) hypoxia/hypoxemia;situational response;infection       Problem: Breathing Pattern Ineffective (Adult)  Goal: Effective Oxygenation/Ventilation  Outcome: Ongoing (interventions implemented as appropriate)   06/02/19 0410   Breathing Pattern Ineffective (Adult)   Effective Oxygenation/Ventilation making progress toward outcome       Problem: Pain, Chronic (Adult)  Goal: Acceptable Pain/Comfort Level and Functional Ability  Outcome: Ongoing (interventions implemented as appropriate)   06/02/19 0410   Pain, Chronic (Adult)   Acceptable Pain/Comfort Level and Functional Ability making progress toward outcome       Problem: Fall Risk (Adult)  Goal: Absence of Fall  Outcome: Ongoing (interventions implemented as appropriate)   06/02/19 0410   Fall Risk (Adult)   Absence of Fall making progress toward outcome

## 2019-06-02 NOTE — PROGRESS NOTES
"  CC: Asthma exacerbation.    S: Continues to be short of breath with minimal exertion. Also continues to have mild cough with exertion.  Feels minimally better today.  Is having issues with abdominal distention.     ROS: Positive for cough, shortness of breath, chest tightness and mild headache. Denies diarrhea or fever.    O: /83 (BP Location: Left arm, Patient Position: Lying)   Pulse 88   Temp 97.3 °F (36.3 °C) (Oral)   Resp 18   Ht 177.8 cm (70\")   Wt (!) 144 kg (317 lb 12.8 oz)   SpO2 97%   BMI 45.60 kg/m²     General: Mild respiratory distress noted.  Neck: No JVD.   Cardiovascular: S1 + S2.  Regular.  CABG scar noted.  Respiratory: Decreased air entry bilaterally with wheezing heard.  Bibasilar crackles noted.  Abdomen/GI: Soft.  Obese.  Bowel sounds positive.  Extremities: 1+ edema noted.  Neurologic: AAOx3. Was able to follow commands.  Skin: No obvious rashes noted.    Labs: Reviewed.     Results from last 7 days   Lab Units 06/02/19  0603 06/01/19  0948 05/31/19  0530   SODIUM mmol/L 133* 133* 131*   POTASSIUM mmol/L 4.3 4.5 4.2   CHLORIDE mmol/L 97* 95* 96*   CO2 mmol/L 30.0 29.0 26.0   BUN mg/dL 26* 31* 28*   CREATININE mg/dL 0.60 0.60 0.60   CALCIUM mg/dL 7.6* 7.8* 8.1*   GLUCOSE mg/dL 79 221* 203*       Results from last 7 days   Lab Units 06/02/19  0603   MAGNESIUM mg/dL 2.9*       Results from last 7 days   Lab Units 06/02/19  0603 06/01/19  0948 05/31/19  0530   WBC 10*3/mm3 17.80* 23.92* 23.80*   NEUTROPHIL % %  --  91.1* 87.9*   HEMOGLOBIN g/dL 14.9 15.6 15.5   HEMATOCRIT % 45.3 47.1 47.3   PLATELETS 10*3/mm3 143 180 184             Lab Results   Component Value Date    PROCALCITO 0.06 05/22/2019    PROCALCITO <0.05 09/01/2017       Lab Results   Component Value Date    PROBNP 173.0 (C) 05/22/2019    PROBNP 35.8 08/10/2017       CXRay: Latest imaging study was reviewed personally.    Imaging Results (last 24 hours)     Procedure Component Value Units Date/Time    XR Abdomen KUB " [[id number]] Collected:  06/02/19 1009     Updated:  06/02/19 1012    Narrative:       PROCEDURE: XR ABDOMEN KUB-     HISTORY: abd pain; Z74.09-Other reduced mobility; Z74.09-Other reduced  mobility        FINDINGS: A single supine view of the abdomen was obtained. There is a  nonobstructive bowel gas pattern. No abnormal calcification is  identified. The bony structures are intact, with no acute abnormality  identified.        Impression:       No acute abdominal process identified.      This report was finalized on 6/2/2019 10:10 AM by Rohit Kerr MD.          Assessment & Recommendations/Plan:   1.  Acute asthma exacerbation  Failed outpatient therapy.  Continues to have significant symptoms, although slowly improving.  Will lower the dose of IV steroids.  Will also decrease the SQ insulin, to 10 units, with every dose of IV steroids.  CT reviewed personally. No PE noted.      2.  Obstructive sleep apnea  Is compliant with his BiPAP.  Recommended to continue using it.    3.  Allergic rhinitis  On nasal sprays.    4.  Morbid obesity  Weight loss was discussed.    5.  Status post CABG    6.   Atelectasis.  Continue IS.     7.  Constipation.  Being given multiple meds to help with this.      8.  HFPEF.   On Lasix.   Will obtain BNP levels.   If elevated, will consider increasing Lasix OR adding another Diuretic.     We have reviewed patient's current orders and changes, if any, have been suggested to primary care team. Plan was also discussed with nursing staff, as necessary.       This document was electronically signed by Linda Barajas MD on 06/02/19 at 1:02 PM      Dictated utilizing Dragon dictation.

## 2019-06-03 ENCOUNTER — APPOINTMENT (OUTPATIENT)
Dept: ULTRASOUND IMAGING | Facility: HOSPITAL | Age: 59
End: 2019-06-03

## 2019-06-03 ENCOUNTER — APPOINTMENT (OUTPATIENT)
Dept: MRI IMAGING | Facility: HOSPITAL | Age: 59
End: 2019-06-03

## 2019-06-03 LAB
ALBUMIN SERPL-MCNC: 3 G/DL (ref 3.5–5)
ALBUMIN/GLOB SERPL: 1.2 G/DL (ref 1–2)
ALP SERPL-CCNC: 72 U/L (ref 38–126)
ALT SERPL W P-5'-P-CCNC: 111 U/L (ref 13–69)
ANION GAP SERPL CALCULATED.3IONS-SCNC: 11.9 MMOL/L (ref 10–20)
AST SERPL-CCNC: 46 U/L (ref 15–46)
BASOPHILS # BLD AUTO: 0.06 10*3/MM3 (ref 0–0.2)
BASOPHILS NFR BLD AUTO: 0.3 % (ref 0–1.5)
BILIRUB SERPL-MCNC: 1 MG/DL (ref 0.2–1.3)
BUN BLD-MCNC: 27 MG/DL (ref 7–20)
BUN/CREAT SERPL: 45 (ref 6.3–21.9)
CALCIUM SPEC-SCNC: 7.7 MG/DL (ref 8.4–10.2)
CHLORIDE SERPL-SCNC: 94 MMOL/L (ref 98–107)
CO2 SERPL-SCNC: 31 MMOL/L (ref 26–30)
CREAT BLD-MCNC: 0.6 MG/DL (ref 0.6–1.3)
DEPRECATED RDW RBC AUTO: 42.2 FL (ref 37–54)
EOSINOPHIL # BLD AUTO: 0.01 10*3/MM3 (ref 0–0.4)
EOSINOPHIL NFR BLD AUTO: 0.1 % (ref 0.3–6.2)
ERYTHROCYTE [DISTWIDTH] IN BLOOD BY AUTOMATED COUNT: 14.4 % (ref 12.3–15.4)
GFR SERPL CREATININE-BSD FRML MDRD: 138 ML/MIN/1.73
GLOBULIN UR ELPH-MCNC: 2.6 GM/DL
GLUCOSE BLD-MCNC: 232 MG/DL (ref 74–98)
GLUCOSE BLDC GLUCOMTR-MCNC: 116 MG/DL (ref 70–130)
GLUCOSE BLDC GLUCOMTR-MCNC: 140 MG/DL (ref 70–130)
GLUCOSE BLDC GLUCOMTR-MCNC: 215 MG/DL (ref 70–130)
GLUCOSE BLDC GLUCOMTR-MCNC: 87 MG/DL (ref 70–130)
HCT VFR BLD AUTO: 47.2 % (ref 37.5–51)
HEMOCCULT STL QL: NEGATIVE
HGB BLD-MCNC: 15.6 G/DL (ref 13–17.7)
IMM GRANULOCYTES # BLD AUTO: 0.37 10*3/MM3 (ref 0–0.05)
IMM GRANULOCYTES NFR BLD AUTO: 2 % (ref 0–0.5)
LIPASE SERPL-CCNC: 607 U/L (ref 23–300)
LYMPHOCYTES # BLD AUTO: 0.48 10*3/MM3 (ref 0.7–3.1)
LYMPHOCYTES NFR BLD AUTO: 2.6 % (ref 19.6–45.3)
MAGNESIUM SERPL-MCNC: 3 MG/DL (ref 1.6–2.3)
MCH RBC QN AUTO: 26.7 PG (ref 26.6–33)
MCHC RBC AUTO-ENTMCNC: 33.1 G/DL (ref 31.5–35.7)
MCV RBC AUTO: 80.7 FL (ref 79–97)
MONOCYTES # BLD AUTO: 0.84 10*3/MM3 (ref 0.1–0.9)
MONOCYTES NFR BLD AUTO: 4.6 % (ref 5–12)
NEUTROPHILS # BLD AUTO: 16.49 10*3/MM3 (ref 1.7–7)
NEUTROPHILS NFR BLD AUTO: 90.4 % (ref 42.7–76)
NRBC BLD AUTO-RTO: 0 /100 WBC (ref 0–0.2)
NT-PROBNP SERPL-MCNC: 152 PG/ML (ref 0–125)
PLATELET # BLD AUTO: 163 10*3/MM3 (ref 140–450)
PMV BLD AUTO: 9.8 FL (ref 6–12)
POTASSIUM BLD-SCNC: 4.9 MMOL/L (ref 3.5–5.1)
PROT SERPL-MCNC: 5.6 G/DL (ref 6.3–8.2)
RBC # BLD AUTO: 5.85 10*6/MM3 (ref 4.14–5.8)
SODIUM BLD-SCNC: 132 MMOL/L (ref 137–145)
WBC NRBC COR # BLD: 18.25 10*3/MM3 (ref 3.4–10.8)

## 2019-06-03 PROCEDURE — 83880 ASSAY OF NATRIURETIC PEPTIDE: CPT | Performed by: INTERNAL MEDICINE

## 2019-06-03 PROCEDURE — 80053 COMPREHEN METABOLIC PANEL: CPT | Performed by: INTERNAL MEDICINE

## 2019-06-03 PROCEDURE — 63710000001 INSULIN REGULAR HUMAN PER 5 UNITS: Performed by: INTERNAL MEDICINE

## 2019-06-03 PROCEDURE — 99232 SBSQ HOSP IP/OBS MODERATE 35: CPT | Performed by: INTERNAL MEDICINE

## 2019-06-03 PROCEDURE — 76705 ECHO EXAM OF ABDOMEN: CPT

## 2019-06-03 PROCEDURE — 83735 ASSAY OF MAGNESIUM: CPT | Performed by: INTERNAL MEDICINE

## 2019-06-03 PROCEDURE — 25010000002 ENOXAPARIN PER 10 MG: Performed by: INTERNAL MEDICINE

## 2019-06-03 PROCEDURE — 85025 COMPLETE CBC W/AUTO DIFF WBC: CPT | Performed by: INTERNAL MEDICINE

## 2019-06-03 PROCEDURE — 82962 GLUCOSE BLOOD TEST: CPT

## 2019-06-03 PROCEDURE — 82272 OCCULT BLD FECES 1-3 TESTS: CPT | Performed by: INTERNAL MEDICINE

## 2019-06-03 PROCEDURE — 83690 ASSAY OF LIPASE: CPT | Performed by: INTERNAL MEDICINE

## 2019-06-03 PROCEDURE — 63710000001 INSULIN ASPART PER 5 UNITS: Performed by: INTERNAL MEDICINE

## 2019-06-03 PROCEDURE — 25010000002 METHYLPREDNISOLONE PER 40 MG: Performed by: INTERNAL MEDICINE

## 2019-06-03 PROCEDURE — 74181 MRI ABDOMEN W/O CONTRAST: CPT

## 2019-06-03 PROCEDURE — 94799 UNLISTED PULMONARY SVC/PX: CPT

## 2019-06-03 PROCEDURE — 99233 SBSQ HOSP IP/OBS HIGH 50: CPT | Performed by: INTERNAL MEDICINE

## 2019-06-03 RX ORDER — CALCIUM CARBONATE 200(500)MG
1 TABLET,CHEWABLE ORAL 3 TIMES DAILY PRN
Status: DISCONTINUED | OUTPATIENT
Start: 2019-06-03 | End: 2019-06-05 | Stop reason: HOSPADM

## 2019-06-03 RX ORDER — METHYLPREDNISOLONE SODIUM SUCCINATE 40 MG/ML
40 INJECTION, POWDER, LYOPHILIZED, FOR SOLUTION INTRAMUSCULAR; INTRAVENOUS EVERY 12 HOURS
Status: DISCONTINUED | OUTPATIENT
Start: 2019-06-03 | End: 2019-06-04

## 2019-06-03 RX ORDER — ALUMINA, MAGNESIA, AND SIMETHICONE 2400; 2400; 240 MG/30ML; MG/30ML; MG/30ML
30 SUSPENSION ORAL EVERY 6 HOURS PRN
Status: DISCONTINUED | OUTPATIENT
Start: 2019-06-03 | End: 2019-06-05 | Stop reason: HOSPADM

## 2019-06-03 RX ORDER — PREDNISONE 10 MG/1
TABLET ORAL
Qty: 70 TABLET | Refills: 0 | Status: SHIPPED | OUTPATIENT
Start: 2019-06-03 | End: 2019-07-03

## 2019-06-03 RX ADMIN — GUAIFENESIN AND DEXTROMETHORPHAN HYDROBROMIDE 1 TABLET: 600; 30 TABLET, EXTENDED RELEASE ORAL at 21:06

## 2019-06-03 RX ADMIN — METOPROLOL TARTRATE 50 MG: 50 TABLET ORAL at 09:35

## 2019-06-03 RX ADMIN — IPRATROPIUM BROMIDE AND ALBUTEROL SULFATE 3 ML: .5; 3 SOLUTION RESPIRATORY (INHALATION) at 20:32

## 2019-06-03 RX ADMIN — IPRATROPIUM BROMIDE AND ALBUTEROL SULFATE 3 ML: .5; 3 SOLUTION RESPIRATORY (INHALATION) at 00:36

## 2019-06-03 RX ADMIN — PANTOPRAZOLE SODIUM 40 MG: 40 TABLET, DELAYED RELEASE ORAL at 06:34

## 2019-06-03 RX ADMIN — RANOLAZINE 500 MG: 500 TABLET, FILM COATED, EXTENDED RELEASE ORAL at 09:32

## 2019-06-03 RX ADMIN — FUROSEMIDE 60 MG: 40 TABLET ORAL at 09:32

## 2019-06-03 RX ADMIN — INSULIN ASPART 50 UNITS: 100 INJECTION, SOLUTION INTRAVENOUS; SUBCUTANEOUS at 17:59

## 2019-06-03 RX ADMIN — MONTELUKAST 10 MG: 10 TABLET, FILM COATED ORAL at 21:06

## 2019-06-03 RX ADMIN — CETIRIZINE HYDROCHLORIDE 10 MG: 10 TABLET, FILM COATED ORAL at 21:06

## 2019-06-03 RX ADMIN — ENOXAPARIN SODIUM 40 MG: 40 INJECTION SUBCUTANEOUS at 06:33

## 2019-06-03 RX ADMIN — SODIUM CHLORIDE, PRESERVATIVE FREE 3 ML: 5 INJECTION INTRAVENOUS at 21:30

## 2019-06-03 RX ADMIN — IPRATROPIUM BROMIDE AND ALBUTEROL SULFATE 3 ML: .5; 3 SOLUTION RESPIRATORY (INHALATION) at 06:59

## 2019-06-03 RX ADMIN — INSULIN ASPART 8 UNITS: 100 INJECTION, SOLUTION INTRAVENOUS; SUBCUTANEOUS at 06:34

## 2019-06-03 RX ADMIN — GABAPENTIN 600 MG: 300 CAPSULE ORAL at 09:32

## 2019-06-03 RX ADMIN — AMITRIPTYLINE HYDROCHLORIDE 75 MG: 25 TABLET, FILM COATED ORAL at 21:06

## 2019-06-03 RX ADMIN — FLUTICASONE PROPIONATE 1 SPRAY: 50 SPRAY, METERED NASAL at 21:08

## 2019-06-03 RX ADMIN — OXYCODONE HYDROCHLORIDE AND ACETAMINOPHEN 500 MG: 500 TABLET ORAL at 09:32

## 2019-06-03 RX ADMIN — INSULIN ASPART 50 UNITS: 100 INJECTION, SOLUTION INTRAVENOUS; SUBCUTANEOUS at 09:34

## 2019-06-03 RX ADMIN — METHYLPREDNISOLONE SODIUM SUCCINATE 40 MG: 40 INJECTION, POWDER, FOR SOLUTION INTRAMUSCULAR; INTRAVENOUS at 17:58

## 2019-06-03 RX ADMIN — AZELASTINE HYDROCHLORIDE 1 SPRAY: 137 SPRAY, METERED NASAL at 09:32

## 2019-06-03 RX ADMIN — METOPROLOL TARTRATE 50 MG: 50 TABLET ORAL at 21:06

## 2019-06-03 RX ADMIN — CALCIUM CARBONATE (ANTACID) CHEW TAB 500 MG 1 TABLET: 500 CHEW TAB at 06:34

## 2019-06-03 RX ADMIN — GABAPENTIN 600 MG: 300 CAPSULE ORAL at 21:06

## 2019-06-03 RX ADMIN — ENOXAPARIN SODIUM 40 MG: 40 INJECTION SUBCUTANEOUS at 17:59

## 2019-06-03 RX ADMIN — IPRATROPIUM BROMIDE AND ALBUTEROL SULFATE 3 ML: .5; 3 SOLUTION RESPIRATORY (INHALATION) at 13:26

## 2019-06-03 RX ADMIN — FLUTICASONE PROPIONATE 1 SPRAY: 50 SPRAY, METERED NASAL at 09:32

## 2019-06-03 RX ADMIN — LISINOPRIL 20 MG: 20 TABLET ORAL at 09:32

## 2019-06-03 RX ADMIN — INSULIN ASPART 50 UNITS: 100 INJECTION, SOLUTION INTRAVENOUS; SUBCUTANEOUS at 14:21

## 2019-06-03 RX ADMIN — SODIUM CHLORIDE, PRESERVATIVE FREE 3 ML: 5 INJECTION INTRAVENOUS at 09:33

## 2019-06-03 RX ADMIN — AZELASTINE HYDROCHLORIDE 1 SPRAY: 137 SPRAY, METERED NASAL at 21:08

## 2019-06-03 RX ADMIN — ATORVASTATIN CALCIUM 80 MG: 80 TABLET, FILM COATED ORAL at 21:06

## 2019-06-03 RX ADMIN — RANOLAZINE 500 MG: 500 TABLET, FILM COATED, EXTENDED RELEASE ORAL at 21:06

## 2019-06-03 RX ADMIN — BUDESONIDE 1 MG: 0.5 INHALANT RESPIRATORY (INHALATION) at 06:58

## 2019-06-03 RX ADMIN — AMLODIPINE BESYLATE 2.5 MG: 5 TABLET ORAL at 09:32

## 2019-06-03 RX ADMIN — ASPIRIN 325 MG ORAL TABLET 325 MG: 325 PILL ORAL at 09:32

## 2019-06-03 RX ADMIN — GUAIFENESIN AND DEXTROMETHORPHAN HYDROBROMIDE 1 TABLET: 600; 30 TABLET, EXTENDED RELEASE ORAL at 09:32

## 2019-06-03 RX ADMIN — ALUMINUM HYDROXIDE, MAGNESIUM HYDROXIDE, AND DIMETHICONE 30 ML: 400; 400; 40 SUSPENSION ORAL at 21:20

## 2019-06-03 RX ADMIN — BUDESONIDE 1 MG: 0.5 INHALANT RESPIRATORY (INHALATION) at 20:32

## 2019-06-03 RX ADMIN — METHYLPREDNISOLONE SODIUM SUCCINATE 40 MG: 40 INJECTION, POWDER, FOR SOLUTION INTRAMUSCULAR; INTRAVENOUS at 06:34

## 2019-06-03 RX ADMIN — SIMETHICONE CHEW TAB 80 MG 80 MG: 80 TABLET ORAL at 09:32

## 2019-06-03 RX ADMIN — HUMAN INSULIN 10 UNITS: 100 INJECTION, SOLUTION SUBCUTANEOUS at 06:34

## 2019-06-03 NOTE — PROGRESS NOTES
"  CC: Asthma exacerbation.    S: Continues to be short of breath with exertion, although appears to have improved some walk.    ROS: Positive for cough, shortness of breath, chest tightness and mild bloating. Denies diarrhea or fever.    O: /78 (BP Location: Left arm, Patient Position: Lying)   Pulse 90   Temp 97.8 °F (36.6 °C) (Oral)   Resp 20   Ht 177.8 cm (70\")   Wt (!) 142 kg (313 lb)   SpO2 90%   BMI 44.91 kg/m²     General: No respiratory distress noted.  Neck: No JVD.   Cardiovascular: S1 + S2.  Regular.  CABG scar noted.  Respiratory: Decreased air entry bilaterally with wheezing heard.  Bibasilar crackles noted.  Abdomen/GI: Soft.  Obese.  Bowel sounds positive.  Extremities: Trace edema noted.  Neurologic: AAOx3. Was able to follow commands.  Skin: No obvious rashes noted.    Labs: Reviewed.     Results from last 7 days   Lab Units 06/03/19  0618 06/02/19  0603 06/01/19  0948   SODIUM mmol/L 132* 133* 133*   POTASSIUM mmol/L 4.9 4.3 4.5   CHLORIDE mmol/L 94* 97* 95*   CO2 mmol/L 31.0* 30.0 29.0   BUN mg/dL 27* 26* 31*   CREATININE mg/dL 0.60 0.60 0.60   CALCIUM mg/dL 7.7* 7.6* 7.8*   GLUCOSE mg/dL 232* 79 221*       Results from last 7 days   Lab Units 06/03/19  0618 06/02/19  0603   MAGNESIUM mg/dL 3.0* 2.9*       Results from last 7 days   Lab Units 06/03/19  0618 06/02/19  0603 06/01/19  0948 05/31/19  0530   WBC 10*3/mm3 18.25* 17.80* 23.92* 23.80*   NEUTROPHIL % % 90.4*  --  91.1* 87.9*   HEMOGLOBIN g/dL 15.6 14.9 15.6 15.5   HEMATOCRIT % 47.2 45.3 47.1 47.3   PLATELETS 10*3/mm3 163 143 180 184             Lab Results   Component Value Date    PROCALCITO 0.06 05/22/2019    PROCALCITO <0.05 09/01/2017       Lab Results   Component Value Date    PROBNP 152.0 (C) 06/03/2019    PROBNP 89.4 06/02/2019    PROBNP 173.0 (C) 05/22/2019       CXRay: Latest imaging study was reviewed personally.    Imaging Results (last 24 hours)     Procedure Component Value Units Date/Time    XR Abdomen KUB " [[id number]] Collected:  06/02/19 1009     Updated:  06/02/19 1012    Narrative:       PROCEDURE: XR ABDOMEN KUB-     HISTORY: abd pain; Z74.09-Other reduced mobility; Z74.09-Other reduced  mobility        FINDINGS: A single supine view of the abdomen was obtained. There is a  nonobstructive bowel gas pattern. No abnormal calcification is  identified. The bony structures are intact, with no acute abnormality  identified.        Impression:       No acute abdominal process identified.      This report was finalized on 6/2/2019 10:10 AM by Rohit Kerr MD.          Assessment & Recommendations/Plan:   1.  Acute asthma exacerbation  Failed outpatient therapy.  Slowly improving.  Will lower the dose of IV steroids further today.  Will continue SQ insulin, at 10 units, with every dose of IV steroids.  He will need to be discharged on long tapering dose of steroids and I have already sent order to the pharmacy.     2.  Obstructive sleep apnea  Is compliant with his BiPAP.  Recommended to continue using it.    3.  Allergic rhinitis  On nasal sprays.     4.  Morbid obesity  Weight loss was discussed.    5.  Status post CABG    6.   Atelectasis.  Continue IS.     7.  Constipation.  Resolved.  Will discontinue lactulose, as the patient refuses to take it anyways.    8.  HFPEF.   On Lasix.   BNP level was only minimally elevated    Ambulate in hallway, 3 times per day.     We have updated the admitting attending and nursing staff, as appropriate, on the patient's current status and plan. I will be going off shift tonight and will be unavailable.       This document was electronically signed by Linda Barajas MD on 06/03/19 at 9:39 AM      Dictated utilizing Dragon dictation.

## 2019-06-03 NOTE — PLAN OF CARE
Problem: Patient Care Overview  Goal: Plan of Care Review  Outcome: Ongoing (interventions implemented as appropriate)   06/02/19 1538 06/02/19 2000 06/03/19 0218   Coping/Psychosocial   Plan of Care Reviewed With --  patient --    Plan of Care Review   Progress improving --  --    OTHER   Outcome Summary --  --  Abdominal pain somewhat improved with simethicone. 3L NC. VSS. Resting in bed. Will continue to monitor.        Problem: Asthma (Adult)  Intervention: Support Effective Ventilation/Oxygenation   06/02/19 0800 06/02/19 1600 06/03/19 0200   Activity   Activity Management --  --  up ad faith   Positioning   Head of Bed (HOB) --  --  HOB elevated   Respiratory Interventions   Airway/Ventilation Management airway patency maintained --  --    Safety Management   Medication Review/Management --  medications reviewed --      Intervention: Support/Optimize Psychosocial Response to Condition   06/01/19 2000 06/02/19 2000   Coping/Psychosocial Interventions   Supportive Measures --  active listening utilized   Psychosocial Support   Family/Support System Care self-care encouraged;support provided --      Intervention: Prevent Recurrent Asthma Exacerbation   06/01/19 0800 06/02/19 1600   Cognitive Interventions   Sensory Stimulation Regulation --  care clustered   Respiratory Interventions   Breathing Techniques/Airway Clearance deep/controlled cough encouraged --        Goal: Signs and Symptoms of Listed Potential Problems Will be Absent, Minimized or Managed (Asthma)  Outcome: Ongoing (interventions implemented as appropriate)   06/02/19 1538 06/03/19 0218   Goal/Outcome Evaluation   Problems Assessed (Asthma) all --    Problems Present (Asthma) --  situational response       Problem: Breathing Pattern Ineffective (Adult)  Intervention: Optimize Oxygenation/Ventilation/Perfusion   06/01/19 0800 06/02/19 0800 06/03/19 0200   Positioning   Head of Bed (HOB) --  --  HOB elevated   Respiratory Interventions    Airway/Ventilation Management --  airway patency maintained --    Breathing Techniques/Airway Clearance deep/controlled cough encouraged --  --      Intervention: Minimize Oxygen Consumption/Demand   06/02/19 2000 06/03/19 0200 06/03/19 0218   Pain/Comfort/Sleep Interventions   Sleep/Rest Enhancement --  --  awakenings minimized   Activity   Activity Management --  up ad faith --    Coping/Psychosocial Interventions   Environmental Support calm environment promoted --  --      Intervention: Monitor/Manage Contributing Psychosocial Factors   06/01/19 2000 06/02/19 2000   Coping/Psychosocial Interventions   Supportive Measures --  active listening utilized   Psychosocial Support   Family/Support System Care self-care encouraged;support provided --        Goal: Effective Oxygenation/Ventilation  Outcome: Ongoing (interventions implemented as appropriate)   06/02/19 1538   Breathing Pattern Ineffective (Adult)   Effective Oxygenation/Ventilation making progress toward outcome     Goal: Anxiety/Fear Reduction  Outcome: Ongoing (interventions implemented as appropriate)   06/02/19 1538   Breathing Pattern Ineffective (Adult)   Anxiety/Fear Reduction making progress toward outcome       Problem: Pain, Chronic (Adult)  Intervention: Manage Persistent Pain Analgesia   06/01/19 2000 06/02/19 1600   Promote Effective Bowel Elimination   Bowel Intervention adequate fluid intake promoted;ambulation promoted;commode/bedpan at bedside --    Safety Management   Medication Review/Management --  medications reviewed     Intervention: Support Psychosocial Response to Persistent Pain   06/02/19 1600 06/02/19 2000   Coping/Psychosocial Interventions   Supportive Measures --  active listening utilized   Psychosocial Support   Diversional Activities television --      Intervention: Mutually Develop/Implement Persistent Pain Management Plan   06/02/19 1200 06/02/19 1600   Promote Oxygenation/Perfusion   Pain Management Interventions see  MAR --    Cognitive Interventions   Sensory Stimulation Regulation --  care clustered       Goal: Acceptable Pain/Comfort Level and Functional Ability  Outcome: Ongoing (interventions implemented as appropriate)   06/02/19 1538   Pain, Chronic (Adult)   Acceptable Pain/Comfort Level and Functional Ability making progress toward outcome       Problem: Fall Risk (Adult)  Intervention: Monitor/Assist with Self Care   06/03/19 0200 06/03/19 0218   Daily Care Interventions   Self-Care Promotion --  independence encouraged   Activity   Activity Assistance Provided independent --      Intervention: Reduce Risk/Promote Restraint Free Environment   06/02/19 2100 06/03/19 0200   Safety Management   Environmental Safety Modification assistive device/personal items within reach;clutter free environment maintained;lighting adjusted --    Safety Promotion/Fall Prevention --  activity supervised;safety round/check completed     Intervention: Review Medications/Identify Contributors to Fall Risk   06/02/19 1600   Safety Management   Medication Review/Management medications reviewed       Goal: Absence of Fall  Outcome: Ongoing (interventions implemented as appropriate)   06/02/19 1538   Fall Risk (Adult)   Absence of Fall making progress toward outcome

## 2019-06-03 NOTE — PROGRESS NOTES
"      [facility]    Name:  Denzel Harding   Age:  59 y.o.  Sex:  male  :  1960  MRN:  [id number]   Visit Number:  [id number]  Admission Date:  2019  Date Of Service:  19  Primary Care Physician:  Ottoniel Toledo MD     LOS: 10 days :  Patient Care Team:  Ottoniel Toledo MD as PCP - General (Family Medicine)  Ottoniel Toledo MD as Referring Physician (Family Medicine):    History taken from:     patient chart    Chief Complaint:      Abdominal distention    Subjective:    Interval History:     Patient seen and examined again today.    Patient is a 59-year-old male with diabetes mellitus type 2 uncontrolled, severe persistent asthma, obstructive sleep apnea on BiPAP who came in on 2019 with shortness of breath.  He had been following with Dr. Barajas at his office.  He has been getting IM steroids there.  Chest x-ray was negative on admission.    Has been slow to improve.  He has severe dyspnea on exertion.  CT PE protocol was done which showed no pulmonary embolism.  Showed bilateral lower lobe atelectasis.  He is on diuretics.  Dr. Barajas is following.  He feels as if he is improving slowly.    Patient was having abdominal pain on 2019.  CT abdomen was ordered.  Lipase was checked and was noted to be above 1200.  He was backed down to clear liquids.  He was also given some IV fluids.  Patient was noted to have large amount of retained stool on CT, he was given lactulose, enema, Dulcolax, he has had multiple bowel movements.  Repeat abdominal film still shows some stool in the colon.  Patient complains of abdominal pain from his xiphoid process down to his umbilicus.  He denies any nausea or vomiting.  After treatment he has had multiple bowel movements, and is now refusing any laxatives.  He is asking for medication for gas.  Lipase again went back up above 600 today.  Patient wants his diet advanced.  He continues to have what he calls \"gas " "pain\".    Ultrasound of liver was done given his elevated liver enzymes as well as pancreatitis.  Hepatitis testing has been sent.  This shows a dilated common bile duct.  Discussed this  with patient will order MRCP to assess this.  Patient will try diet, if he has pain he will back down to fluids.  We will also continue to monitor his lipase.    Review of Systems:     All systems were reviewed and negative except for:  Gastrointestinal: positive for  bloating / distention    Objective:    Vital Signs:    Temp:  [97.3 °F (36.3 °C)-98 °F (36.7 °C)] 97.8 °F (36.6 °C)  Heart Rate:  [68-90] 72  Resp:  [16-20] 20  BP: (132-145)/(78-90) 132/90    Physical Exam:      General Appearance:    Alert, cooperative, in no acute distress obese   Head:    Normocephalic, without obvious abnormality, atraumatic   Eyes:            Lids and lashes normal, conjunctivae and sclerae normal, no   icterus, no pallor, corneas clear, PERRLA   Ears:    Ears appear intact with no abnormalities noted   Throat:   No oral lesions, no thrush, oral mucosa moist   Neck:   No adenopathy, supple, trachea midline, no thyromegaly, no   carotid bruit, no JVD   Back:     No kyphosis present, no scoliosis present, no skin lesions,      erythema or scars, no tenderness to percussion or                   palpation,   range of motion normal   Lungs:     Clear to auscultation,respirations regular, even and                  unlabored    Heart:    Regular rhythm and normal rate, normal S1 and S2, no            murmur, no gallop, no rub, no click   Chest Wall:    No abnormalities observed   Abdomen:    Decreased bowel sounds, no masses, no organomegaly, soft ,mildly tender in the epigastric area, mild distention, no guarding, no rebound   tenderness   Rectal:     Deferred   Extremities:   Moves all extremities well, no edema, no cyanosis, no             redness   Pulses:   Pulses palpable and equal bilaterally   Skin:   No bleeding, bruising or rash   Lymph " nodes:   No palpable adenopathy   Neurologic:   Cranial nerves 2 - 12 grossly intact, sensation intact, DTR       present and equal bilaterally        Results Review:      I reviewed the patient's new clinical results.    Labs:    Lab Results (last 24 hours)     Procedure Component Value Units Date/Time    POC Glucose Once [[id number]]  (Abnormal) Collected:  06/03/19 1157    Specimen:  Blood Updated:  06/03/19 1217     Glucose 140 mg/dL      Comment: Serial Number: [id number]:  [id number]       Hepatitis Panel, Acute [[id number]] Collected:  06/02/19 0603    Specimen:  Blood Updated:  06/03/19 1204    BNP [[id number]]  (Abnormal) Collected:  06/03/19 0618    Specimen:  Blood Updated:  06/03/19 0718     proBNP 152.0 pg/mL     Comprehensive Metabolic Panel [[id number]]  (Abnormal) Collected:  06/03/19 0618    Specimen:  Blood Updated:  06/03/19 0717     Glucose 232 mg/dL      Comment: Glucose >180, Hemoglobin A1C recommended.        BUN 27 mg/dL      Creatinine 0.60 mg/dL      Sodium 132 mmol/L      Potassium 4.9 mmol/L      Chloride 94 mmol/L      CO2 31.0 mmol/L      Calcium 7.7 mg/dL      Total Protein 5.6 g/dL      Albumin 3.00 g/dL      ALT (SGPT) 111 U/L      AST (SGOT) 46 U/L      Alkaline Phosphatase 72 U/L      Total Bilirubin 1.0 mg/dL      eGFR Non African Amer 138 mL/min/1.73      Globulin 2.6 gm/dL      A/G Ratio 1.2 g/dL      BUN/Creatinine Ratio 45.0     Anion Gap 11.9 mmol/L     Narrative:       GFR Normal >60  Chronic Kidney Disease <60  Kidney Failure <15    Lipase [[id number]]  (Abnormal) Collected:  06/03/19 0618    Specimen:  Blood Updated:  06/03/19 0711     Lipase 607 U/L     Magnesium [[id number]]  (Abnormal) Collected:  06/03/19 0618    Specimen:  Blood Updated:  06/03/19 0711     Magnesium 3.0 mg/dL     CBC & Differential [[id number]] Collected:  06/03/19 0618    Specimen:  Blood Updated:  06/03/19 0645    Narrative:       The following orders were created for panel order CBC &  Differential.  Procedure                               Abnormality         Status                     ---------                               -----------         ------                     CBC Auto Differential[[id number]]        Abnormal            Final result                 Please view results for these tests on the individual orders.    CBC Auto Differential [[id number]]  (Abnormal) Collected:  06/03/19 0618    Specimen:  Blood Updated:  06/03/19 0645     WBC 18.25 10*3/mm3      RBC 5.85 10*6/mm3      Hemoglobin 15.6 g/dL      Hematocrit 47.2 %      MCV 80.7 fL      MCH 26.7 pg      MCHC 33.1 g/dL      RDW 14.4 %      RDW-SD 42.2 fl      MPV 9.8 fL      Platelets 163 10*3/mm3      Neutrophil % 90.4 %      Lymphocyte % 2.6 %      Monocyte % 4.6 %      Eosinophil % 0.1 %      Basophil % 0.3 %      Immature Grans % 2.0 %      Neutrophils, Absolute 16.49 10*3/mm3      Lymphocytes, Absolute 0.48 10*3/mm3      Monocytes, Absolute 0.84 10*3/mm3      Eosinophils, Absolute 0.01 10*3/mm3      Basophils, Absolute 0.06 10*3/mm3      Immature Grans, Absolute 0.37 10*3/mm3      nRBC 0.0 /100 WBC     POC Glucose Once [[id number]]  (Abnormal) Collected:  06/03/19 0623    Specimen:  Blood Updated:  06/03/19 0626     Glucose 215 mg/dL      Comment: Serial Number: [id number]:  [id number]       Occult Blood X 1, Stool - Stool, [[id number]]  (Normal) Collected:  06/03/19 0524    Specimen:  Stool Updated:  06/03/19 0559     Fecal Occult Blood Negative    POC Glucose Once [[id number]]  (Abnormal) Collected:  06/02/19 2047    Specimen:  Blood Updated:  06/02/19 2106     Glucose 232 mg/dL      Comment: Serial Number: [id number]:  [id number]       POC Glucose Once [[id number]]  (Abnormal) Collected:  06/02/19 1700    Specimen:  Blood Updated:  06/02/19 1711     Glucose 225 mg/dL      Comment: Serial Number: [id number]:  [id number]       BNP [[id number]]  (Normal) Collected:  06/02/19 0603    Specimen:  Blood Updated:  06/02/19 1324      proBNP 89.4 pg/mL            Radiology:    Imaging Results (last 24 hours)     Procedure Component Value Units Date/Time    MRI abdomen wo contrast mrcp [[id number]] Resulted:  06/03/19 1229     Updated:  06/03/19 1229    US Liver [[id number]] Collected:  06/03/19 1101     Updated:  06/03/19 1105    Narrative:       PROCEDURE: US LIVER-     HISTORY: pancreatitis, elevated lfts; Z74.09-Other reduced mobility;  Z74.09-Other reduced mobility     TECHNIQUE: Sonographic images of the liver were obtained in the  transverse and sagittal planes with color flow and pulse Doppler.     FINDINGS: The liver is echogenic consistent with fatty infiltration. No  focal liver lesions are identified. There is no evidence of a focal  liver mass. The hepatic vasculature is patent with normal directional  flow. The portal vein measures  11.70 mm . The patient is status post  cholecystectomy. The common duct measures 12 mm. Limited images of the  right kidney are unremarkable.       Impression:       1. Fatty infiltration of the liver.  2. Dilatation of the common bile duct for a postcholecystectomy patient  measuring 12 mm.        This report was finalized on 6/3/2019 11:03 AM by Aspen Decker M.D..          Medication Review:       amitriptyline 75 mg Oral Nightly   amLODIPine 2.5 mg Oral Daily   aspirin 325 mg Oral Daily   atorvastatin 80 mg Oral Nightly   azelastine 1 spray Each Nare BID   Bromocriptine Mesylate 3.2 mg Oral QAM AC   budesonide 1 mg Nebulization BID - RT   cetirizine 10 mg Oral Nightly   enoxaparin 40 mg Subcutaneous Q12H   fluticasone 1 spray Each Nare BID   furosemide 60 mg Oral Daily   gabapentin 600 mg Oral Q12H   guaifenesin-dextromethorphan 1 tablet Oral Q12H   insulin aspart 0-24 Units Subcutaneous 4x Daily AC & at Bedtime   insulin aspart 50 Units Subcutaneous TID With Meals   Insulin Glargine 160 Units Subcutaneous Nightly   insulin regular 10 Units Subcutaneous Q8H   ipratropium-albuterol 3 mL  Nebulization Q6H - RT   Liraglutide 0.6 mg Subcutaneous Nightly   lisinopril 20 mg Oral Daily   methylPREDNISolone sodium succinate 40 mg Intravenous Q12H   metoprolol tartrate 50 mg Oral BID   montelukast 10 mg Oral Nightly   pantoprazole 40 mg Oral QAM   ranolazine 500 mg Oral BID   sodium chloride 3 mL Intravenous Q12H   vitamin C 500 mg Oral Daily   Vortioxetine HBr 20 mg Oral Daily            Assessment:    Problem List Items Addressed This Visit     None      Visit Diagnoses     Impaired mobility and ADLs    -  Primary    Impaired functional mobility, balance, gait, and endurance              1.  Acute asthma exacerbation with allergic rhinitis present on admission-patient is on IV steroids, bronchodilators.      2.  Diabetes mellitus type 2-uncontrolled     3.  Constipation with large amount of retained stool     4.  Leukocytosis-  Likely steroid related however given that he does have lymphopenia, full smear has been ordered.  This appears to be improving.     5.  Lymphopenia-  Peripheral smear ordered.       6.  COPD with no evidence of exacerbation     7.  Obstructive sleep apnea on BiPAP     8.  Chronic essential hypertension-blood pressure is stable continue to monitor     9.  Chronic lower back pain with opioid use     10.  Seizure disorder-seizure precautions     11.  Coronary artery disease status post CABG in the past-medical management  12.  Likely obesity hypoventilation syndrome  13.  Elevated lipase, consistent with pancreatitis.  Mildly improved   14.  Significant constipation, improved  15.  Abdominal pain, with dilated common bile duct        Plan:    Patient will be continued on IV fluids.  Advance diet patient's request.  Will check MRCP.  Check lipase in the a.m.  Increase amount of Gaviscon.  Check lab work in the a.m.  Recheck lipase in the a.m.  Continue with Pulmicort, Flonase, Lasix, DuoNeb, Solu-Medrol, Singulair.  Dr. Barajas is following.  Lovenox for DVT prophylaxis.  Further  recommendations will depend on the clinical course.    Blane Mohan DO  06/03/19  12:34 PM

## 2019-06-04 LAB
ALBUMIN SERPL-MCNC: 2.9 G/DL (ref 3.5–5)
ALBUMIN/GLOB SERPL: 1.2 G/DL (ref 1–2)
ALP SERPL-CCNC: 70 U/L (ref 38–126)
ALT SERPL W P-5'-P-CCNC: 114 U/L (ref 13–69)
ANION GAP SERPL CALCULATED.3IONS-SCNC: 10.7 MMOL/L (ref 10–20)
AST SERPL-CCNC: 45 U/L (ref 15–46)
BASOPHILS # BLD AUTO: 0.05 10*3/MM3 (ref 0–0.2)
BASOPHILS NFR BLD AUTO: 0.3 % (ref 0–1.5)
BILIRUB SERPL-MCNC: 0.8 MG/DL (ref 0.2–1.3)
BUN BLD-MCNC: 23 MG/DL (ref 7–20)
BUN/CREAT SERPL: 38.3 (ref 6.3–21.9)
CALCIUM SPEC-SCNC: 8.1 MG/DL (ref 8.4–10.2)
CHLORIDE SERPL-SCNC: 97 MMOL/L (ref 98–107)
CO2 SERPL-SCNC: 29 MMOL/L (ref 26–30)
CREAT BLD-MCNC: 0.6 MG/DL (ref 0.6–1.3)
DEPRECATED RDW RBC AUTO: 42 FL (ref 37–54)
EOSINOPHIL # BLD AUTO: 0 10*3/MM3 (ref 0–0.4)
EOSINOPHIL NFR BLD AUTO: 0 % (ref 0.3–6.2)
ERYTHROCYTE [DISTWIDTH] IN BLOOD BY AUTOMATED COUNT: 14.5 % (ref 12.3–15.4)
GFR SERPL CREATININE-BSD FRML MDRD: 138 ML/MIN/1.73
GLOBULIN UR ELPH-MCNC: 2.5 GM/DL
GLUCOSE BLD-MCNC: 114 MG/DL (ref 74–98)
GLUCOSE BLDC GLUCOMTR-MCNC: 105 MG/DL (ref 70–130)
GLUCOSE BLDC GLUCOMTR-MCNC: 148 MG/DL (ref 70–130)
GLUCOSE BLDC GLUCOMTR-MCNC: 166 MG/DL (ref 70–130)
GLUCOSE BLDC GLUCOMTR-MCNC: 180 MG/DL (ref 70–130)
GLUCOSE BLDC GLUCOMTR-MCNC: 68 MG/DL (ref 70–130)
HAV IGM SERPL QL IA: NEGATIVE
HBV CORE IGM SERPL QL IA: NEGATIVE
HBV SURFACE AG SERPL QL IA: NEGATIVE
HCT VFR BLD AUTO: 46.8 % (ref 37.5–51)
HCV AB S/CO SERPL IA: <0.1 S/CO RATIO (ref 0–0.9)
HGB BLD-MCNC: 15.4 G/DL (ref 13–17.7)
IMM GRANULOCYTES # BLD AUTO: 0.32 10*3/MM3 (ref 0–0.05)
IMM GRANULOCYTES NFR BLD AUTO: 1.7 % (ref 0–0.5)
LIPASE SERPL-CCNC: 494 U/L (ref 23–300)
LYMPHOCYTES # BLD AUTO: 0.89 10*3/MM3 (ref 0.7–3.1)
LYMPHOCYTES NFR BLD AUTO: 4.8 % (ref 19.6–45.3)
MAGNESIUM SERPL-MCNC: 2.7 MG/DL (ref 1.6–2.3)
MCH RBC QN AUTO: 26.1 PG (ref 26.6–33)
MCHC RBC AUTO-ENTMCNC: 32.9 G/DL (ref 31.5–35.7)
MCV RBC AUTO: 79.5 FL (ref 79–97)
MONOCYTES # BLD AUTO: 0.94 10*3/MM3 (ref 0.1–0.9)
MONOCYTES NFR BLD AUTO: 5 % (ref 5–12)
NEUTROPHILS # BLD AUTO: 16.42 10*3/MM3 (ref 1.7–7)
NEUTROPHILS NFR BLD AUTO: 88.2 % (ref 42.7–76)
NRBC BLD AUTO-RTO: 0 /100 WBC (ref 0–0.2)
PLATELET # BLD AUTO: 169 10*3/MM3 (ref 140–450)
PMV BLD AUTO: 9.5 FL (ref 6–12)
POTASSIUM BLD-SCNC: 4.7 MMOL/L (ref 3.5–5.1)
PROT SERPL-MCNC: 5.4 G/DL (ref 6.3–8.2)
RBC # BLD AUTO: 5.89 10*6/MM3 (ref 4.14–5.8)
SODIUM BLD-SCNC: 132 MMOL/L (ref 137–145)
WBC NRBC COR # BLD: 18.62 10*3/MM3 (ref 3.4–10.8)

## 2019-06-04 PROCEDURE — 94799 UNLISTED PULMONARY SVC/PX: CPT

## 2019-06-04 PROCEDURE — 85025 COMPLETE CBC W/AUTO DIFF WBC: CPT | Performed by: INTERNAL MEDICINE

## 2019-06-04 PROCEDURE — 94618 PULMONARY STRESS TESTING: CPT

## 2019-06-04 PROCEDURE — 63710000001 INSULIN ASPART PER 5 UNITS: Performed by: INTERNAL MEDICINE

## 2019-06-04 PROCEDURE — 25010000002 ENOXAPARIN PER 10 MG: Performed by: INTERNAL MEDICINE

## 2019-06-04 PROCEDURE — 82962 GLUCOSE BLOOD TEST: CPT

## 2019-06-04 PROCEDURE — 99232 SBSQ HOSP IP/OBS MODERATE 35: CPT | Performed by: NURSE PRACTITIONER

## 2019-06-04 PROCEDURE — 63710000001 INSULIN REGULAR HUMAN PER 5 UNITS: Performed by: INTERNAL MEDICINE

## 2019-06-04 PROCEDURE — 80053 COMPREHEN METABOLIC PANEL: CPT | Performed by: INTERNAL MEDICINE

## 2019-06-04 PROCEDURE — 83690 ASSAY OF LIPASE: CPT | Performed by: NURSE PRACTITIONER

## 2019-06-04 PROCEDURE — 83735 ASSAY OF MAGNESIUM: CPT | Performed by: INTERNAL MEDICINE

## 2019-06-04 PROCEDURE — 25010000002 METHYLPREDNISOLONE PER 40 MG: Performed by: INTERNAL MEDICINE

## 2019-06-04 RX ORDER — FLUCONAZOLE 100 MG/1
100 TABLET ORAL
Status: COMPLETED | OUTPATIENT
Start: 2019-06-04 | End: 2019-06-05

## 2019-06-04 RX ORDER — FLUCONAZOLE 100 MG/1
100 TABLET ORAL EVERY 24 HOURS
Status: DISCONTINUED | OUTPATIENT
Start: 2019-06-04 | End: 2019-06-04

## 2019-06-04 RX ORDER — PREDNISONE 20 MG/1
40 TABLET ORAL
Status: DISCONTINUED | OUTPATIENT
Start: 2019-06-05 | End: 2019-06-05 | Stop reason: HOSPADM

## 2019-06-04 RX ORDER — TIZANIDINE 4 MG/1
4 TABLET ORAL EVERY 8 HOURS PRN
Status: DISCONTINUED | OUTPATIENT
Start: 2019-06-04 | End: 2019-06-05 | Stop reason: HOSPADM

## 2019-06-04 RX ADMIN — GABAPENTIN 600 MG: 300 CAPSULE ORAL at 08:55

## 2019-06-04 RX ADMIN — BUDESONIDE 1 MG: 0.5 INHALANT RESPIRATORY (INHALATION) at 06:51

## 2019-06-04 RX ADMIN — ATORVASTATIN CALCIUM 80 MG: 80 TABLET, FILM COATED ORAL at 20:43

## 2019-06-04 RX ADMIN — FUROSEMIDE 60 MG: 40 TABLET ORAL at 08:56

## 2019-06-04 RX ADMIN — RANOLAZINE 500 MG: 500 TABLET, FILM COATED, EXTENDED RELEASE ORAL at 20:44

## 2019-06-04 RX ADMIN — SODIUM CHLORIDE, PRESERVATIVE FREE 3 ML: 5 INJECTION INTRAVENOUS at 20:51

## 2019-06-04 RX ADMIN — METOPROLOL TARTRATE 50 MG: 50 TABLET ORAL at 08:56

## 2019-06-04 RX ADMIN — MONTELUKAST 10 MG: 10 TABLET, FILM COATED ORAL at 20:43

## 2019-06-04 RX ADMIN — TIZANIDINE 4 MG: 4 TABLET ORAL at 16:05

## 2019-06-04 RX ADMIN — FLUCONAZOLE 100 MG: 100 TABLET ORAL at 11:16

## 2019-06-04 RX ADMIN — IPRATROPIUM BROMIDE AND ALBUTEROL SULFATE 3 ML: .5; 3 SOLUTION RESPIRATORY (INHALATION) at 20:04

## 2019-06-04 RX ADMIN — OXYCODONE HYDROCHLORIDE AND ACETAMINOPHEN 500 MG: 500 TABLET ORAL at 08:56

## 2019-06-04 RX ADMIN — LISINOPRIL 20 MG: 20 TABLET ORAL at 08:56

## 2019-06-04 RX ADMIN — BUDESONIDE 1 MG: 0.5 INHALANT RESPIRATORY (INHALATION) at 20:03

## 2019-06-04 RX ADMIN — INSULIN ASPART 50 UNITS: 100 INJECTION, SOLUTION INTRAVENOUS; SUBCUTANEOUS at 08:55

## 2019-06-04 RX ADMIN — PANTOPRAZOLE SODIUM 40 MG: 40 TABLET, DELAYED RELEASE ORAL at 07:02

## 2019-06-04 RX ADMIN — METHYLPREDNISOLONE SODIUM SUCCINATE 40 MG: 40 INJECTION, POWDER, FOR SOLUTION INTRAMUSCULAR; INTRAVENOUS at 07:03

## 2019-06-04 RX ADMIN — SODIUM CHLORIDE, PRESERVATIVE FREE 10 ML: 5 INJECTION INTRAVENOUS at 07:03

## 2019-06-04 RX ADMIN — ASPIRIN 325 MG ORAL TABLET 325 MG: 325 PILL ORAL at 08:56

## 2019-06-04 RX ADMIN — INSULIN ASPART 4 UNITS: 100 INJECTION, SOLUTION INTRAVENOUS; SUBCUTANEOUS at 11:16

## 2019-06-04 RX ADMIN — AMLODIPINE BESYLATE 2.5 MG: 5 TABLET ORAL at 08:56

## 2019-06-04 RX ADMIN — ENOXAPARIN SODIUM 40 MG: 40 INJECTION SUBCUTANEOUS at 07:03

## 2019-06-04 RX ADMIN — RANOLAZINE 500 MG: 500 TABLET, FILM COATED, EXTENDED RELEASE ORAL at 08:55

## 2019-06-04 RX ADMIN — NYSTATIN 500000 UNITS: 500000 SUSPENSION ORAL at 20:43

## 2019-06-04 RX ADMIN — IPRATROPIUM BROMIDE AND ALBUTEROL SULFATE 3 ML: .5; 3 SOLUTION RESPIRATORY (INHALATION) at 06:51

## 2019-06-04 RX ADMIN — IPRATROPIUM BROMIDE AND ALBUTEROL SULFATE 3 ML: .5; 3 SOLUTION RESPIRATORY (INHALATION) at 01:17

## 2019-06-04 RX ADMIN — CETIRIZINE HYDROCHLORIDE 10 MG: 10 TABLET, FILM COATED ORAL at 20:44

## 2019-06-04 RX ADMIN — SODIUM CHLORIDE, PRESERVATIVE FREE 3 ML: 5 INJECTION INTRAVENOUS at 09:00

## 2019-06-04 RX ADMIN — HUMAN INSULIN 10 UNITS: 100 INJECTION, SOLUTION SUBCUTANEOUS at 07:03

## 2019-06-04 RX ADMIN — AZELASTINE HYDROCHLORIDE 1 SPRAY: 137 SPRAY, METERED NASAL at 20:43

## 2019-06-04 RX ADMIN — FLUTICASONE PROPIONATE 1 SPRAY: 50 SPRAY, METERED NASAL at 20:43

## 2019-06-04 RX ADMIN — AMITRIPTYLINE HYDROCHLORIDE 75 MG: 25 TABLET, FILM COATED ORAL at 20:43

## 2019-06-04 RX ADMIN — GABAPENTIN 600 MG: 300 CAPSULE ORAL at 20:43

## 2019-06-04 RX ADMIN — NYSTATIN 500000 UNITS: 500000 SUSPENSION ORAL at 11:16

## 2019-06-04 RX ADMIN — INSULIN ASPART 50 UNITS: 100 INJECTION, SOLUTION INTRAVENOUS; SUBCUTANEOUS at 11:17

## 2019-06-04 RX ADMIN — IPRATROPIUM BROMIDE AND ALBUTEROL SULFATE 3 ML: .5; 3 SOLUTION RESPIRATORY (INHALATION) at 12:24

## 2019-06-04 RX ADMIN — NYSTATIN 500000 UNITS: 500000 SUSPENSION ORAL at 18:03

## 2019-06-04 RX ADMIN — AZELASTINE HYDROCHLORIDE 1 SPRAY: 137 SPRAY, METERED NASAL at 08:57

## 2019-06-04 RX ADMIN — ENOXAPARIN SODIUM 40 MG: 40 INJECTION SUBCUTANEOUS at 18:02

## 2019-06-04 RX ADMIN — METOPROLOL TARTRATE 50 MG: 50 TABLET ORAL at 20:44

## 2019-06-04 RX ADMIN — GUAIFENESIN AND DEXTROMETHORPHAN HYDROBROMIDE 1 TABLET: 600; 30 TABLET, EXTENDED RELEASE ORAL at 08:55

## 2019-06-04 RX ADMIN — INSULIN ASPART 4 UNITS: 100 INJECTION, SOLUTION INTRAVENOUS; SUBCUTANEOUS at 18:31

## 2019-06-04 RX ADMIN — FLUTICASONE PROPIONATE 1 SPRAY: 50 SPRAY, METERED NASAL at 08:57

## 2019-06-04 RX ADMIN — GUAIFENESIN AND DEXTROMETHORPHAN HYDROBROMIDE 1 TABLET: 600; 30 TABLET, EXTENDED RELEASE ORAL at 20:43

## 2019-06-04 NOTE — PLAN OF CARE
Problem: Pain, Chronic (Adult)  Goal: Acceptable Pain/Comfort Level and Functional Ability  Outcome: Ongoing (interventions implemented as appropriate)   06/04/19 1224   Pain, Chronic (Adult)   Acceptable Pain/Comfort Level and Functional Ability making progress toward outcome

## 2019-06-04 NOTE — PROGRESS NOTES
Exercise Oximetry    Patient Name:Denzel Harding   MRN: [id number]   Date: 06/04/19             ROOM AIR BASELINE   SpO2%    94   Heart Rate 74   Blood Pressure      EXERCISE ON ROOM AIR SpO2% EXERCISE ON O2 @  LPM SpO2%   1 MINUTE 95 1 MINUTE    2 MINUTES 96 2 MINUTES    3 MINUTES 96 3 MINUTES    4 MINUTES 97 4 MINUTES    5 MINUTES 96 5 MINUTES    6 MINUTES 96 6 MINUTES               Distance Walked                             250 Distance Walked   Dyspnea (Loida Scale)                        6 Dyspnea (Loida Scale)   Fatigue (Loida Scale)                           6 Fatigue (Loida Scale)   SpO2% Post Exercise                       97 SpO2% Post Exercise   HR Post Exercise                               85 HR Post Exercise   Time to Recovery              2 mins Time to Recovery     Comments: pt walked at own slow pace with the assistance of a walker.   Pt does not require any O2

## 2019-06-04 NOTE — PROGRESS NOTES
PROGRESS NOTE        Date of Admission: 5/22/2019  Length of Stay: 11  Primary Care Physician: Ottoniel Toledo MD    Subjective   Chief Complaint: Follow up asthma exacerbation  HPI: This is a 59-year-old male with past medical history significant for diabetes mellitus type 2, severe persistent asthma, obstructive sleep apnea on BiPAP who came into the emergency room for shortness of breath and cough.  He was seen and evaluated by Dr. Barajas with pulmonology for unresolving asthma exacerbation.  He had been getting IM steroids and Dr. Barajas's office for 2 days prior to admission with little to no help.  Chest x-ray was done at the time of admission which did not show any acute cardiopulmonary process.  He did complain of some weakness and fatigue.  He denied any fevers chills or sick contacts.  Been wearing his BiPAP at home over the past 24 hours prior to his admission which did not help his symptomatology.  And due to the steroids his blood sugars had also been elevated.  Patient was admitted for some asthma exacerbation.      Upon admission to the hospitalist service pulmonology was consulted.  He has been seen and evaluated and followed by Dr. Barajas with pulmonary.  Patient has been slow to improve.  His steroids were increased this week by pulmonology since he continued to be tight and wheezing.  His wheezing has improved and he is moving air better however he remains quite short of breath.  He continues to get significantly short of breath with any minimal exertion.  A CT PE protocol was done  which was negative for pulmonary embolism and he was noted however to have mild bilateral lower lobe atelectasis and trace of bilateral pleural effusions.  He is on diuretics at home and has been continued during this admission.  Echocardiogram was done which showed a normal left ventricular systolic function with an EF greater than 55%.    He does have some leukocytosis however I do feel that this is steroid related.    He does have some lymphopenia for which I have ordered a peripheral smear which is pending.      He was also noted to have some abdominal distention for which a CT scan was done and he was noted to have large amount of stool.  He was placed on lactulose and given an enema.  This did appear to help with his abdomen.  He did have a large bowel movement.  Did have some anasarca noted as well.  He did have some complaints of abdominal pain for which a lipase was done and was noted to be elevated.  An MRCP was done which was unremarkable.  His lipase has been trending down.    I have seen and evaluated patient at bedside today.  He is overall feeling better but feels that he needs 1 more day in the hospital.  I have ordered a walking oximetry for which he does not require oxygen.  I have encouraged the patient to get up and ambulate in the hallway today.  I have explained to the patient at this point we can start to put him on oral steroids and start the weaning process.  He does have a nebulizer at home.  We will plan to discharge the patient home tomorrow as he does appear to be symptomatically improving.  I have tried to reassure the patient with his fears that his breathing may worsen upon discharge.  He is concerned since he will not have oxygen at home.  I have explained to the patient that during the walking oximetry he did not require oxygen at rest or with activity.        Review Of Systems: Systems reviewed on 6/4/2019 with the following findings:  Review of Systems   General ROS: Patient denies any fevers, chills or loss of consciousness.    Psychological ROS: Denies any hallucinations and delusions.  Ophthalmic ROS: Denies any diplopia or transient loss of vision.  ENT ROS: Denies sore throat, nasal congestion or ear pain.   Hematological and Lymphatic ROS: Denies neck swelling or easy bleeding.  Endocrine ROS: Denies any recent unintentional weight gain or loss.  Respiratory ROS:  cough and shortness of  breath.  Does not feel as well today as yesterday  Cardiovascular ROS: Denies chest pain or palpitations. No history of exertional chest pain.   Gastrointestinal ROS: Denies nausea and vomiting. Denies any abdominal pain. No diarrhea.  Genito-Urinary ROS: Denies dysuria or hematuria.  Musculoskeletal ROS: Denies back pain. No muscle pain. No calf pain.   Neurological ROS: Denies any focal weakness. No loss of consciousness. Denies any numbness. Denies neck pain.   Dermatological ROS: Denies any redness or pruritis.    Objective      Temp:  [97.7 °F (36.5 °C)-98 °F (36.7 °C)] 97.7 °F (36.5 °C)  Heart Rate:  [72-96] 73  Resp:  [16-21] 16  BP: (127-147)/(66-90) 139/80  Physical Exam   physical examination done on 6/4/2019 with the following findings:    General Appearance:  Alert and cooperative, not in any acute distress.   Head:  Atraumatic and normocephalic, without obvious abnormality.   Eyes:          PERRLA, conjunctivae and sclerae normal, no Icterus. No pallor. Extraocular movements are within normal limits.   Ears:  Ears appear intact with no abnormalities noted.   Throat: No oral lesions, no thrush, oral mucosa moist.   Neck: Supple, trachea midline, no thyromegaly, no carotid bruit.       Lungs:   Chest shape is normal. Breath sounds heard bilaterally equally.  Few crackles in RUL Bilateral wheezing has improved.  Moving air much better in bilateral upper lobes.  Still decreased in BLL. No Pleural rub or bronchial breathing.   Heart:  Normal S1 and S2, no murmur, no gallop, no rub. No JVD   Abdomen:   Normal bowel sounds, no masses, no organomegaly. Soft    non-tender, non-distended, no guarding, no rebound             Tenderness, obese   Extremities: Moves all extremities well, no edema, no cyanosis, no clubbing.   Pulses: Pulses palpable and equal bilaterally   Skin: No bleeding, bruising or rash   Lymph nodes: No palpable adenopathy   Neurologic:    Psychiatric/Behavior:     Cranial nerves 2 - 12 grossly  intact, sensation intact, Motor power is normal and equal bilaterally.  Mood normal, behavior normal       Results Review:    I have reviewed the labs, radiology results and diagnostic studies.    Results from last 7 days   Lab Units 06/04/19  0641   WBC 10*3/mm3 18.62*   HEMOGLOBIN g/dL 15.4   PLATELETS 10*3/mm3 169     Results from last 7 days   Lab Units 06/04/19  0641   SODIUM mmol/L 132*   POTASSIUM mmol/L 4.7   CO2 mmol/L 29.0   CREATININE mg/dL 0.60   GLUCOSE mg/dL 114*       Culture Data:    Radiology Data:   Cardiology Data:    I have reviewed the medications.    Assessment/Plan     Assessment and Plan:  1.  Acute asthma exacerbation with allergic rhinitis present on admission-patient is on IV steroids, bronchodilators.  His wheezing has improved I will start to decrease the IV steroids specially given that his white count continues to elevate.  CT PE protocol was done which was negative for PE no evidence of pneumonia.  He did have some atelectasis and a trace of bilateral lower lobe effusions.    Walking oximetry was done for which the patient does not require home oxygen.      2.  Diabetes mellitus type 2-we will check A1c in the morning, patient is on insulin protocol.  Continue to monitor blood sugars and titrate accordingly.  Hemoglobin A1c was 10.7.  His blood sugars are doing better with the taper of steroids.  I have discontinued the Novolin R.  We will continue with his home medication regimen.      3.  Exertional dyspnea-this could be multifactorial including his asthma, deconditioning.  I will go ahead and check a 2D echo of his heart just to ensure no valvular problems could be contributing to his shortness of breath and given that he does have a history of lower extremity edema on higher dose diuretics.  He did have a stress test in December 2018 which was negative.  His ejection fraction at that time was noted to be 50%.    4.  Leukocytosis-  Likely steroid related however given that he does  have lymphopenia, I have ordered a peripheral smear is still pending.    5.  Lymphopenia-  Peripheral smear ordered.  It does appear that he has had some mild lymphopenia in the past that at one point was 4.2.  However this does appear to be more significant during this hospitalization.  This could be secondary to his illness.  His lymphocytes upon admission were within normal limits.  Peripheral smear is pending.    6.  COPD with no evidence of exacerbation    7.  Obstructive sleep apnea on BiPAP    8.  Chronic essential hypertension-blood pressure is stable continue to monitor    9.  Chronic lower back pain with opioid use    10.  Seizure disorder-seizure precautions    11.  Coronary artery disease status post CABG in the past-medical management    12.  Acute pancreatitis-lipase is trending down.  Patient denies any nausea vomiting or abdominal pain.  MRCP was done which was unremarkable.    13.  Constipation-improved    14.  Suspected obesity hypoventilation syndrome-we will follow-up with Dr. Barajas as an outpatient.  Will need sleep evaluation.        DVT prophylaxis:  Lovenox  Discharge Planning: Hopefully within the next 48 hours  Ashley Waterman, ANTONIO 06/04/19 11:22 AM

## 2019-06-04 NOTE — PLAN OF CARE
Problem: Patient Care Overview  Goal: Plan of Care Review  Outcome: Ongoing (interventions implemented as appropriate)   06/04/19 0436   Coping/Psychosocial   Plan of Care Reviewed With patient   Plan of Care Review   Progress no change   OTHER   Outcome Summary No acute events overnight. Continue to monitor.      Goal: Discharge Needs Assessment  Outcome: Ongoing (interventions implemented as appropriate)      Problem: Asthma (Adult)  Goal: Signs and Symptoms of Listed Potential Problems Will be Absent, Minimized or Managed (Asthma)  Outcome: Ongoing (interventions implemented as appropriate)      Problem: Breathing Pattern Ineffective (Adult)  Goal: Effective Oxygenation/Ventilation  Outcome: Ongoing (interventions implemented as appropriate)    Goal: Anxiety/Fear Reduction  Outcome: Ongoing (interventions implemented as appropriate)      Problem: Pain, Chronic (Adult)  Goal: Acceptable Pain/Comfort Level and Functional Ability  Outcome: Ongoing (interventions implemented as appropriate)      Problem: Fall Risk (Adult)  Goal: Absence of Fall  Outcome: Ongoing (interventions implemented as appropriate)

## 2019-06-05 VITALS
DIASTOLIC BLOOD PRESSURE: 49 MMHG | OXYGEN SATURATION: 94 % | SYSTOLIC BLOOD PRESSURE: 88 MMHG | WEIGHT: 313.05 LBS | TEMPERATURE: 98.6 F | HEART RATE: 70 BPM | RESPIRATION RATE: 16 BRPM | HEIGHT: 70 IN | BODY MASS INDEX: 44.82 KG/M2

## 2019-06-05 PROBLEM — B37.0 THRUSH: Status: ACTIVE | Noted: 2019-06-05

## 2019-06-05 LAB
ALBUMIN SERPL-MCNC: 2.6 G/DL (ref 3.5–5)
ALBUMIN/GLOB SERPL: 1.1 G/DL (ref 1–2)
ALP SERPL-CCNC: 66 U/L (ref 38–126)
ALT SERPL W P-5'-P-CCNC: 96 U/L (ref 13–69)
ANION GAP SERPL CALCULATED.3IONS-SCNC: 9.6 MMOL/L (ref 10–20)
AST SERPL-CCNC: 33 U/L (ref 15–46)
BASOPHILS # BLD AUTO: 0.03 10*3/MM3 (ref 0–0.2)
BASOPHILS NFR BLD AUTO: 0.2 % (ref 0–1.5)
BILIRUB SERPL-MCNC: 0.7 MG/DL (ref 0.2–1.3)
BUN BLD-MCNC: 25 MG/DL (ref 7–20)
BUN/CREAT SERPL: 35.7 (ref 6.3–21.9)
CALCIUM SPEC-SCNC: 7.6 MG/DL (ref 8.4–10.2)
CHLORIDE SERPL-SCNC: 94 MMOL/L (ref 98–107)
CO2 SERPL-SCNC: 31 MMOL/L (ref 26–30)
CREAT BLD-MCNC: 0.7 MG/DL (ref 0.6–1.3)
DEPRECATED RDW RBC AUTO: 42.9 FL (ref 37–54)
EOSINOPHIL # BLD AUTO: 0.1 10*3/MM3 (ref 0–0.4)
EOSINOPHIL NFR BLD AUTO: 0.6 % (ref 0.3–6.2)
ERYTHROCYTE [DISTWIDTH] IN BLOOD BY AUTOMATED COUNT: 14.9 % (ref 12.3–15.4)
GFR SERPL CREATININE-BSD FRML MDRD: 116 ML/MIN/1.73
GLOBULIN UR ELPH-MCNC: 2.3 GM/DL
GLUCOSE BLD-MCNC: 183 MG/DL (ref 74–98)
GLUCOSE BLDC GLUCOMTR-MCNC: 124 MG/DL (ref 70–130)
GLUCOSE BLDC GLUCOMTR-MCNC: 248 MG/DL (ref 70–130)
HCT VFR BLD AUTO: 40.9 % (ref 37.5–51)
HGB BLD-MCNC: 13.4 G/DL (ref 13–17.7)
IMM GRANULOCYTES # BLD AUTO: 0.23 10*3/MM3 (ref 0–0.05)
IMM GRANULOCYTES NFR BLD AUTO: 1.5 % (ref 0–0.5)
LIPASE SERPL-CCNC: 525 U/L (ref 23–300)
LYMPHOCYTES # BLD AUTO: 1.39 10*3/MM3 (ref 0.7–3.1)
LYMPHOCYTES NFR BLD AUTO: 9 % (ref 19.6–45.3)
MCH RBC QN AUTO: 26.1 PG (ref 26.6–33)
MCHC RBC AUTO-ENTMCNC: 32.8 G/DL (ref 31.5–35.7)
MCV RBC AUTO: 79.6 FL (ref 79–97)
MONOCYTES # BLD AUTO: 0.9 10*3/MM3 (ref 0.1–0.9)
MONOCYTES NFR BLD AUTO: 5.8 % (ref 5–12)
NEUTROPHILS # BLD AUTO: 12.84 10*3/MM3 (ref 1.7–7)
NEUTROPHILS NFR BLD AUTO: 82.9 % (ref 42.7–76)
NRBC BLD AUTO-RTO: 0 /100 WBC (ref 0–0.2)
PLATELET # BLD AUTO: 135 10*3/MM3 (ref 140–450)
PMV BLD AUTO: 10.1 FL (ref 6–12)
POTASSIUM BLD-SCNC: 4.6 MMOL/L (ref 3.5–5.1)
PROT SERPL-MCNC: 4.9 G/DL (ref 6.3–8.2)
RBC # BLD AUTO: 5.14 10*6/MM3 (ref 4.14–5.8)
SODIUM BLD-SCNC: 130 MMOL/L (ref 137–145)
WBC NRBC COR # BLD: 15.49 10*3/MM3 (ref 3.4–10.8)

## 2019-06-05 PROCEDURE — 94799 UNLISTED PULMONARY SVC/PX: CPT

## 2019-06-05 PROCEDURE — 63710000001 INSULIN ASPART PER 5 UNITS: Performed by: INTERNAL MEDICINE

## 2019-06-05 PROCEDURE — 99239 HOSP IP/OBS DSCHRG MGMT >30: CPT | Performed by: NURSE PRACTITIONER

## 2019-06-05 PROCEDURE — 82962 GLUCOSE BLOOD TEST: CPT

## 2019-06-05 PROCEDURE — 25010000002 ENOXAPARIN PER 10 MG: Performed by: INTERNAL MEDICINE

## 2019-06-05 PROCEDURE — 85025 COMPLETE CBC W/AUTO DIFF WBC: CPT | Performed by: NURSE PRACTITIONER

## 2019-06-05 PROCEDURE — 63710000001 PREDNISONE PER 1 MG: Performed by: NURSE PRACTITIONER

## 2019-06-05 PROCEDURE — 83690 ASSAY OF LIPASE: CPT | Performed by: NURSE PRACTITIONER

## 2019-06-05 PROCEDURE — 80053 COMPREHEN METABOLIC PANEL: CPT | Performed by: NURSE PRACTITIONER

## 2019-06-05 RX ADMIN — INSULIN ASPART 50 UNITS: 100 INJECTION, SOLUTION INTRAVENOUS; SUBCUTANEOUS at 08:18

## 2019-06-05 RX ADMIN — MELATONIN TAB 5 MG 5 MG: 5 TAB at 01:34

## 2019-06-05 RX ADMIN — IPRATROPIUM BROMIDE AND ALBUTEROL SULFATE 3 ML: .5; 3 SOLUTION RESPIRATORY (INHALATION) at 01:07

## 2019-06-05 RX ADMIN — FLUTICASONE PROPIONATE 1 SPRAY: 50 SPRAY, METERED NASAL at 09:27

## 2019-06-05 RX ADMIN — AZELASTINE HYDROCHLORIDE 1 SPRAY: 137 SPRAY, METERED NASAL at 09:27

## 2019-06-05 RX ADMIN — GABAPENTIN 600 MG: 300 CAPSULE ORAL at 08:17

## 2019-06-05 RX ADMIN — INSULIN ASPART 8 UNITS: 100 INJECTION, SOLUTION INTRAVENOUS; SUBCUTANEOUS at 06:46

## 2019-06-05 RX ADMIN — INSULIN ASPART 50 UNITS: 100 INJECTION, SOLUTION INTRAVENOUS; SUBCUTANEOUS at 11:44

## 2019-06-05 RX ADMIN — OXYCODONE HYDROCHLORIDE AND ACETAMINOPHEN 500 MG: 500 TABLET ORAL at 08:17

## 2019-06-05 RX ADMIN — PREDNISONE 40 MG: 20 TABLET ORAL at 08:17

## 2019-06-05 RX ADMIN — PANTOPRAZOLE SODIUM 40 MG: 40 TABLET, DELAYED RELEASE ORAL at 06:46

## 2019-06-05 RX ADMIN — IPRATROPIUM BROMIDE AND ALBUTEROL SULFATE 3 ML: .5; 3 SOLUTION RESPIRATORY (INHALATION) at 06:58

## 2019-06-05 RX ADMIN — METOPROLOL TARTRATE 50 MG: 50 TABLET ORAL at 08:17

## 2019-06-05 RX ADMIN — FUROSEMIDE 60 MG: 40 TABLET ORAL at 08:17

## 2019-06-05 RX ADMIN — RANOLAZINE 500 MG: 500 TABLET, FILM COATED, EXTENDED RELEASE ORAL at 08:17

## 2019-06-05 RX ADMIN — NYSTATIN 500000 UNITS: 500000 SUSPENSION ORAL at 08:17

## 2019-06-05 RX ADMIN — NYSTATIN 500000 UNITS: 500000 SUSPENSION ORAL at 11:44

## 2019-06-05 RX ADMIN — TIZANIDINE 4 MG: 4 TABLET ORAL at 09:32

## 2019-06-05 RX ADMIN — TIZANIDINE 4 MG: 4 TABLET ORAL at 01:34

## 2019-06-05 RX ADMIN — ASPIRIN 325 MG ORAL TABLET 325 MG: 325 PILL ORAL at 08:17

## 2019-06-05 RX ADMIN — AMLODIPINE BESYLATE 2.5 MG: 5 TABLET ORAL at 08:17

## 2019-06-05 RX ADMIN — FLUCONAZOLE 100 MG: 100 TABLET ORAL at 08:17

## 2019-06-05 RX ADMIN — GUAIFENESIN AND DEXTROMETHORPHAN HYDROBROMIDE 1 TABLET: 600; 30 TABLET, EXTENDED RELEASE ORAL at 08:17

## 2019-06-05 RX ADMIN — LISINOPRIL 20 MG: 20 TABLET ORAL at 08:17

## 2019-06-05 RX ADMIN — ENOXAPARIN SODIUM 40 MG: 40 INJECTION SUBCUTANEOUS at 06:46

## 2019-06-05 RX ADMIN — BUDESONIDE 1 MG: 0.5 INHALANT RESPIRATORY (INHALATION) at 06:58

## 2019-06-05 NOTE — PROGRESS NOTES
Discharge Planning Assessment   [facility]     Patient Name: Denzel Harding  MRN: [id number]  Today's Date: 6/5/2019    Admit Date: 5/22/2019    Discharge Needs Assessment    No documentation.       Discharge Plan     Row Name 06/05/19 1319       Plan    Final Discharge Disposition Code  01 - home or self-care        Destination      No service coordination in this encounter.      Durable Medical Equipment      No service coordination in this encounter.      Dialysis/Infusion      No service coordination in this encounter.      Home Medical Care      No service coordination in this encounter.      Therapy      No service coordination in this encounter.      Community Resources      No service coordination in this encounter.        Expected Discharge Date and Time     Expected Discharge Date Expected Discharge Time    Jun 5, 2019         Demographic Summary    No documentation.       Functional Status    No documentation.       Psychosocial    No documentation.       Abuse/Neglect    No documentation.       Legal    No documentation.       Substance Abuse    No documentation.       Patient Forms    No documentation.           Viviana Jimenes

## 2019-06-05 NOTE — PLAN OF CARE
Problem: Patient Care Overview  Goal: Plan of Care Review  Outcome: Ongoing (interventions implemented as appropriate)   06/05/19 1119   Coping/Psychosocial   Plan of Care Reviewed With patient   Plan of Care Review   Progress improving   OTHER   Outcome Summary Patient to be discharged.

## 2019-06-05 NOTE — DISCHARGE INSTRUCTIONS
Will need repeat CBC and BMP when following up with PCP  I will follow up on peripheral smear and instructed patient should there be any concerns, I will notify him with further recommendation.

## 2019-06-05 NOTE — PLAN OF CARE
Problem: Patient Care Overview  Goal: Plan of Care Review  Outcome: Ongoing (interventions implemented as appropriate)   06/05/19 0431   Coping/Psychosocial   Plan of Care Reviewed With patient   Plan of Care Review   Progress no change   OTHER   Outcome Summary No acute events overnight. Continue to monitor.        Problem: Asthma (Adult)  Goal: Signs and Symptoms of Listed Potential Problems Will be Absent, Minimized or Managed (Asthma)  Outcome: Ongoing (interventions implemented as appropriate)      Problem: Breathing Pattern Ineffective (Adult)  Goal: Effective Oxygenation/Ventilation  Outcome: Ongoing (interventions implemented as appropriate)    Goal: Anxiety/Fear Reduction  Outcome: Ongoing (interventions implemented as appropriate)      Problem: Pain, Chronic (Adult)  Goal: Acceptable Pain/Comfort Level and Functional Ability  Outcome: Ongoing (interventions implemented as appropriate)      Problem: Fall Risk (Adult)  Goal: Absence of Fall  Outcome: Ongoing (interventions implemented as appropriate)

## 2019-06-05 NOTE — DISCHARGE SUMMARY
[facility]   DISCHARGE SUMMARY    Name:  Denzel Harding   Age:  59 y.o.  Sex:  male  :  1960  MRN:  [id number]   Visit Number:  [id number]  Primary Care Physician:  Ottoniel Toledo MD  Date of Discharge:  2019  Admission Date:  2019      Presenting Problem:    Acute exacerbation of asthma with allergic rhinitis [J45.901]  Asthma [J45.909]       Discharge Diagnosis:       Acute exacerbation of asthma with allergic rhinitis    Coronary arteriosclerosis in native artery    Seizure disorder (CMS/[facility])    Hypertension    Type 2 diabetes mellitus (CMS/[facility])    Asthma    Thrush        Past Medical History:  Past Medical History:   Diagnosis Date   • Anxiety    • Arthritis    • Asthma    • BiPAP (biphasic positive airway pressure) dependence    • Brachial neuritis 2017   • Chest pain    • CHF (congestive heart failure) (CMS/[facility])    • COPD (chronic obstructive pulmonary disease) (CMS/[facility])    • Coronary artery disease    • Depression    • Diabetes mellitus (CMS/[facility])    • Dizziness    • Edema    • GERD (gastroesophageal reflux disease)    • H/O chest x-ray 2015    Mild Left base atelectasis   • H/O echocardiogram 2015    Normal LVSF. EF of 60-65%. Grade 1 diastolic dysfunction of the LV myocardium. No evidence of pericardial effusion   • H/O exercise stress test    • History of herniated intervertebral disc     History of left L5-S1 disc herniation   • Hypertension    • LOM (loss of memory) 2017   • Lower back pain     Right   • Measles    • Obstructive sleep apnea    • Palpitations    • Pericardial effusion    • Seizure disorder (CMS/[facility])    • Seizures (CMS/[facility])     FOCAL    • Shortness of breath 2017   • SOB (shortness of breath)    • Stroke (CMS/[facility])    • Wears glasses          Consults:     Consults     Date and Time Order Name Status Description    2019 0728 Inpatient Pulmonology Consult Completed           Procedures Performed:  None              History of presenting illness/Hospital Course:    This is a 59-year-old male with past medical history significant for diabetes mellitus type 2, severe persistent asthma, obstructive sleep apnea on BiPAP who came into the emergency room for shortness of breath and cough.  He was seen and evaluated by Dr. Barajas with pulmonology for unresolving asthma exacerbation.  He had been getting IM steroids and Dr. Barajas's office for 2 days prior to admission with little to no help.  Chest x-ray was done at the time of admission which did not show any acute cardiopulmonary process.  He did complain of some weakness and fatigue.  He denied any fevers chills or sick contacts.  Been wearing his BiPAP at home over the past 24 hours prior to his admission which did not help his symptomatology.  And due to the steroids his blood sugars had also been elevated.  Patient was admitted for some asthma exacerbation.       Upon admission to the hospitalist service pulmonology was consulted.  He has been seen and evaluated and followed by Dr. Barajas with pulmonary.  Patient has been slow to improve.  His steroids were increased this week by pulmonology since he continued to be tight and wheezing.  His wheezing has improved and he is moving air better however he remains quite short of breath.  He continues to get significantly short of breath with any minimal exertion.  A CT PE protocol was done  which was negative for pulmonary embolism and he was noted however to have mild bilateral lower lobe atelectasis and trace of bilateral pleural effusions.  He is on diuretics at home and has been continued during this admission.  Echocardiogram was done which showed a normal left ventricular systolic function with an EF greater than 55%.     He does have some leukocytosis however I do feel that this is steroid related.   He does have some lymphopenia for which I have ordered a peripheral smear which is pending.  Since his steroids are in the  process of weaning, his leukocytosis is resolving as well.  His WBC today 15,000.       He was also noted to have some abdominal distention for which a CT scan was done and he was noted to have large amount of stool.  He was placed on lactulose and given an enema.  This did appear to help with his abdomen.  He did have a large bowel movement.  Did have some anasarca noted as well.  He did have some complaints of abdominal pain for which a lipase was done and was noted to be elevated.   There is no history of pancreatitis.  An MRCP was done which was unremarkable.  His lipase has been trending down.  He has no GI complaints.     I have seen and evaluated patient at bedside today.  Patient had a walking oximetry for which he does not require home oxygen.  He understands that he does not qualify for oxygen stating that he has not qualified in the past.  I did discuss findings of MRCP, US liver.   Pulmonology has ordered a steroid taper for discharge.  He has a nebulizer at home.  He is otherwise symptomatically improved from the hospitalist standpoint for discharge home.  Patient is doing much better.  I have discussed discharge plan for which patient is satisfied.    I do recommend repeat CBC and BMP in 1 week when following up with PCP.      Vital Signs:    Temp:  [97.1 °F (36.2 °C)-98.2 °F (36.8 °C)] 97.1 °F (36.2 °C)  Heart Rate:  [77-92] 92  Resp:  [18] 18  BP: (120-139)/(74-80) 139/80    Physical Exam:  General Appearance:    Alert and cooperative, not in any acute distress.   Head:    Atraumatic and normocephalic, without obvious abnormality.   Eyes:            PERRLA, conjunctivae and sclerae normal, no Icterus. No pallor. Extra-occular movements are within normal limits.   Ears:    Ears appear intact with no abnormalities noted.   Throat:   No oral lesions, no thrush, oral mucosa moist.   Neck:   Supple, trachea midline, no thyromegaly, no carotid bruit.   Back:     No kyphoscoliosis present. No tenderness to  palpation,   range of motion normal.   Lungs:     Chest shape is normal. Breath sounds heard bilaterally equally.  No crackles or wheezing. No Pleural rub or bronchial breathing.    Heart:    Normal S1 and S2, no murmur, no gallop, no rub. No JVD   Abdomen:     Normal bowel sounds, no masses, no organomegaly. Soft        non-tender, non-distended, no guarding, no rebound                Tenderness, obese   Extremities:   Moves all extremities well, no edema, no cyanosis, no             clubbing   Pulses:   Pulses palpable and equal bilaterally   Skin:   No bleeding, bruising or rash     Psychiatric/Behavior:        Normal mood, normal behavior   Neurologic:   Cranial nerves 2 - 12 grossly intact, sensation intact, Motor power is normal and equal bilaterally.           Pertinent Lab Results:     Results from last 7 days   Lab Units 06/05/19 0618 06/04/19 0641 06/03/19 0618   SODIUM mmol/L 130* 132* 132*   POTASSIUM mmol/L 4.6 4.7 4.9   CHLORIDE mmol/L 94* 97* 94*   CO2 mmol/L 31.0* 29.0 31.0*   BUN mg/dL 25* 23* 27*   CREATININE mg/dL 0.70 0.60 0.60   CALCIUM mg/dL 7.6* 8.1* 7.7*   BILIRUBIN mg/dL 0.7 0.8 1.0   ALK PHOS U/L 66 70 72   ALT (SGPT) U/L 96* 114* 111*   AST (SGOT) U/L 33 45 46   GLUCOSE mg/dL 183* 114* 232*     Results from last 7 days   Lab Units 06/05/19 0618 06/04/19 0641 06/03/19 0618   WBC 10*3/mm3 15.49* 18.62* 18.25*   HEMOGLOBIN g/dL 13.4 15.4 15.6   HEMATOCRIT % 40.9 46.8 47.2   PLATELETS 10*3/mm3 135* 169 163                Pertinent Radiology Results:    Imaging Results (all)     Procedure Component Value Units Date/Time    MRI abdomen wo contrast mrcp [[id number]] Collected:  06/03/19 1314     Updated:  06/03/19 1318    Narrative:       PROCEDURE: MRI ABDOMEN WO CONTRAST MRCP-     HISTORY: Abnormal liver function tests (LFTs); Z74.09-Other reduced  mobility; Z74.09-Other reduced mobility     PROCEDURE: Multiplanar multisequence imaging of the abdomen was  performed without the use of  intravenous contrast. 3-D MRCP images were  obtained.     COMPARISON: None.     FINDINGS: MRCP images demonstrate prior cholecystectomy. There is no  intra or extrahepatic biliary dilatation. The common duct measures 4 mm  and tapers normally to the ampulla. There are no filling defects. The  pancreatic duct is normal.     Imaging of the liver reveals signal dropout on the T1-weighted out of  phase images consistent with fatty infiltration. There are no focal  liver lesions. There are bilateral renal cysts. The adrenals, spleen and  pancreas are unremarkable. There is no ascites.       Impression:       Unremarkable MRCP.           This report was finalized on 6/3/2019 1:16 PM by Aspen Decker M.D..    US Liver [[id number]] Collected:  06/03/19 1101     Updated:  06/03/19 1105    Narrative:       PROCEDURE: US LIVER-     HISTORY: pancreatitis, elevated lfts; Z74.09-Other reduced mobility;  Z74.09-Other reduced mobility     TECHNIQUE: Sonographic images of the liver were obtained in the  transverse and sagittal planes with color flow and pulse Doppler.     FINDINGS: The liver is echogenic consistent with fatty infiltration. No  focal liver lesions are identified. There is no evidence of a focal  liver mass. The hepatic vasculature is patent with normal directional  flow. The portal vein measures  11.70 mm . The patient is status post  cholecystectomy. The common duct measures 12 mm. Limited images of the  right kidney are unremarkable.       Impression:       1. Fatty infiltration of the liver.  2. Dilatation of the common bile duct for a postcholecystectomy patient  measuring 12 mm.        This report was finalized on 6/3/2019 11:03 AM by Aspen Decker M.D..    XR Abdomen KUB [[id number]] Collected:  06/02/19 1009     Updated:  06/02/19 1012    Narrative:       PROCEDURE: XR ABDOMEN KUB-     HISTORY: abd pain; Z74.09-Other reduced mobility; Z74.09-Other reduced  mobility        FINDINGS: A single supine  view of the abdomen was obtained. There is a  nonobstructive bowel gas pattern. No abnormal calcification is  identified. The bony structures are intact, with no acute abnormality  identified.        Impression:       No acute abdominal process identified.      This report was finalized on 6/2/2019 10:10 AM by Rohit Kerr MD.    CT Abdomen Pelvis Without Contrast [[id number]] Collected:  06/01/19 1212     Updated:  06/01/19 1213    Narrative:       FINAL REPORT    CLINICAL HISTORY:  Abdominal pain, unspecified    FINDINGS:  Axial CT images of the abdomen and pelvis were obtained without  intravenous contrast. Coronal reformatted images were also  obtained.This study was performed with techniques to keep  radiation doses as low as reasonably achievable (ALARA).  Individualized dose reduction techniques using automated  exposure control or adjustment of mA and/or kV according to the  patient''s size were employed.  Abdomen: There is mild bibasilar  atelectasis.  There are small bilateral pleural effusions.  There is no evidence of renal stone or hydronephrosis.  Postoperative changes are seen from cholecystectomy.  Anasarca  is noted.  There are moderate vascular calcifications.  A large  amount of retained stool is present.  The liver, spleen and  pancreas have an unremarkable, unenhanced appearance. No mass or  adenopathy is seen. No inflammatory process is identified.  Pelvis: Images of the pelvis reveal no evidence of ureteral  dilation or ureteral stone.No mass or abnormal fluid collection  is identified.  The appendix has an unremarkable appearance.      Impression:       No renal or ureteral stone, or hydronephrosis.  Anasarca.  Large  amount of retained stool may represent constipation.    Authenticated by Rohit Kerr III, MD on 06/01/2019  12:12:29 PM    XR Chest PA & Lateral [[id number]] Collected:  06/01/19 0843     Updated:  06/01/19 0847    Narrative:       PROCEDURE: XR CHEST PA AND LATERAL-      HISTORY: dypnea, pleural effusion; Z74.09-Other reduced mobility;  Z74.09-Other reduced mobility        COMPARISON: 05/28/2019.      FINDINGS: Cardiomegaly is noted. Post operative changes are seen from  median sternotomy. No acute pulmonary abnormality is identified. There  is a stable small left pleural effusion or pleural thickening. There is  no pneumothorax. The bony thorax is intact.       Impression:       Cardiomegaly and postoperative changes.     Small left effusion or pleural thickening, stable..           This report was finalized on 6/1/2019 8:45 AM by Rohit Kerr MD.    CT Chest Pulmonary Embolism With Contrast [[id number]] Collected:  05/30/19 1122     Updated:  05/30/19 1125    Narrative:       PROCEDURE: CT CHEST PULMONARY EMBOLISM W CONTRAST-     HISTORY: Shortness of breath; Z74.09-Other reduced mobility;  Z74.09-Other reduced mobility     TECHNIQUE: Thin section axial CT with IV contrast supplemented with3D  reconstructed MIP images.     FINDINGS:     Pulmonary vessels enhance in a normal fashion without evidence of  embolic disease. Thoracic aorta is normal in caliber without evidence of  aneurysm or dissection.     Mild bilateral lower lobe atelectasis is present..  Trace bilateral  pleural effusions are noted.. No adenopathy or mass lesion is present.       Impression:       1. No evidence of pulmonary embolism.  2. Mild bilateral lower lobe atelectasis and trace bilateral pleural  effusions           This study was performed with techniques to keep radiation doses as low  as reasonably achievable (ALARA). Individualized dose reduction  techniques using automated exposure control or adjustment of vA and/or  kV according to the patient size were employed.      This report was finalized on 5/30/2019 11:23 AM by Phoenix Bella MD.    XR Chest 2 View [[id number]] Collected:  05/28/19 1550     Updated:  05/28/19 1558    Narrative:       PROCEDURE: XR CHEST 2 VW-     INDICATION: SOA      COMPARISON:  05/22/2019     FINDINGS: PA and lateral views of the chest were obtained.   Patient is  again noted to be status post median sternotomy. Cardiac and mediastinal  silhouettes are within normal limits.   There is minimal bilateral lower  lobe atelectasis.  A small left pleural effusion is present. There is no  pneumothorax.  No acute osseous abnormality is identified.       Impression:       Bilateral lower lobe atelectasis with a small left pleural  effusion.                 This report was finalized on 5/28/2019 3:52 PM by Rosanna Martinez MD.    XR Chest 1 View [[id number]] Collected:  05/22/19 1546     Updated:  05/22/19 1548    Narrative:       PROCEDURE: XR CHEST 1 VW-     HISTORY: soa     COMPARISON: 03/07/2019.     FINDINGS: The patient is status post median sternotomy and CABG  procedure. The heart is normal in size. The mediastinum is unremarkable.  The lungs are clear. There is no pneumothorax.  There are no acute  osseous abnormalities.       Impression:       No acute cardiopulmonary process.     Continued followup is recommended.     This report was finalized on 5/22/2019 3:46 PM by Aspen Decker M.D..          Condition on Discharge:    Stable        Discharge Disposition:    Home or Self Care    Discharge Medication:       Discharge Medications      New Medications      Instructions Start Date   nystatin 282065 UNIT/ML suspension  Commonly known as:  MYCOSTATIN   500,000 Units, Swish & Swallow, 4 Times Daily      predniSONE 10 MG tablet  Commonly known as:  DELTASONE   Take 4 tablets by mouth Daily for 7 days, THEN 3 tablets Daily for 7 days, THEN 2 tablets Daily for 7 days, THEN 1 tablet Daily for 7 days.   Start Date:  6/3/2019        Changes to Medications      Instructions Start Date   azelastine 0.1 % nasal spray  Commonly known as:  ASTELIN  What changed:    · when to take this  · additional instructions   1 spray, Nasal, 2 Times Daily PRN, Use in each nostril as directed       HUMALOG KWIKPEN 100 UNIT/ML solution pen-injector  Generic drug:  Insulin Lispro  What changed:    · how much to take  · additional instructions   10 Units, Subcutaneous, 3 Times Daily With Meals, Follows SSI From PCP      TOUJEO SOLOSTAR 300 UNIT/ML solution pen-injector  Generic drug:  Insulin Glargine  What changed:    · how much to take  · when to take this   60 Units, Injection, Daily         Continue These Medications      Instructions Start Date   albuterol sulfate  (90 Base) MCG/ACT inhaler  Commonly known as:  PROVENTIL HFA;VENTOLIN HFA;PROAIR HFA   2 puffs, Inhalation, Every 4 Hours PRN, ProAir  (90 Base) MCG/ACT Inhalation Aerosol Solution; Patient Sig: ProAir  (90 Base) MCG/ACT Inhalation Aerosol Solution ; 8; 0; 05-Oct-2015; Active      amitriptyline 25 MG tablet  Commonly known as:  ELAVIL   75 mg, Oral, Nightly      amLODIPine 5 MG tablet  Commonly known as:  NORVASC   2.5 mg, Oral, Daily      aspirin 325 MG tablet   325 mg, Oral, Daily      atorvastatin 80 MG tablet  Commonly known as:  LIPITOR   80 mg, Oral, Nightly      Cetirizine HCl 10 MG capsule   1 capsule, Oral, Nightly      coenzyme Q10 100 MG capsule   100 mg, Oral, Daily      cyclobenzaprine 10 MG tablet  Commonly known as:  FLEXERIL   10 mg, Oral, 3 Times Daily PRN      CYCLOSET 0.8 MG tablet  Generic drug:  Bromocriptine Mesylate   3.2 mg, Oral, Every Morning      furosemide 40 MG tablet  Commonly known as:  LASIX   Take alternating doses 40mg and 80 mg po daily      gabapentin 600 MG tablet  Commonly known as:  NEURONTIN   600 mg, Oral, 2 Times Daily      glucose blood test strip   Use as instructed      HYDROcodone-acetaminophen 5-325 MG per tablet  Commonly known as:  NORCO   1 tablet, Oral, Every 6 Hours PRN      ipratropium-albuterol 0.5-2.5 mg/3 ml nebulizer  Commonly known as:  DUO-NEB   3 mL, Nebulization, 4 Times Daily PRN      levocetirizine 5 MG tablet  Commonly known as:  XYZAL   5 mg, Oral, Every  Evening      lisinopril 20 MG tablet  Commonly known as:  PRINIVIL,ZESTRIL   20 mg, Oral, Daily      metoprolol tartrate 50 MG tablet  Commonly known as:  LOPRESSOR   take 1 tablet by mouth twice a day      Mometasone Furoate 100 MCG/ACT aerosol   2 puffs, Inhalation, 2 Times Daily, Rinse mouth with water after use.      montelukast 10 MG tablet  Commonly known as:  SINGULAIR   10 mg, Oral, Every Night at Bedtime      omeprazole 20 MG capsule  Commonly known as:  priLOSEC   1 capsule, Oral, Nightly      ondansetron ODT 4 MG disintegrating tablet  Commonly known as:  ZOFRAN-ODT   4 mg, Oral, Every 6 Hours PRN      potassium chloride 20 MEQ CR tablet  Commonly known as:  K-DUR,KLOR-CON   20 mEq, Oral, Daily      RANEXA 500 MG 12 hr tablet  Generic drug:  ranolazine    TAKE 1 TABLET BY MOUTH 2 TIMES A DAY      Tiotropium Bromide Monohydrate 1.25 MCG/ACT aerosol solution inhaler  Commonly known as:  SPIRIVA RESPIMAT   2 puffs, Inhalation, Daily      TRINTELLIX 20 MG tablet  Generic drug:  Vortioxetine HBr   20 mg, Oral, Daily      TURMERIC PO   Oral, Daily      VICTOZA 18 MG/3ML solution pen-injector injection  Generic drug:  Liraglutide   1.2 mg, Subcutaneous, Daily      vitamin C 500 MG tablet  Commonly known as:  ASCORBIC ACID   500 mg, Oral, Daily             Discharge Diet:     Diet Instructions     Diet: Consistent Carbohydrate; Thin      Discharge Diet:  Consistent Carbohydrate    Fluid Consistency:  Thin          Activity at Discharge:     Activity Instructions     Discharge Activity            Follow-up Appointments:    Future Appointments   Date Time Provider Department Center   6/17/2019 11:15 AM London Jiménez III, MD [facility] None   9/19/2019 10:00 AM Matilde Bains APRN [facility] None         Test Results Pending at Discharge:     Order Current Status    Peripheral Blood Smear In process        Discharge Instructions:  Follow up with Dr. Barajas in 2 weeks  Follow up with PCP in 1 week.   Will need repeat CBC and BMP when following up with PCP  I will follow up on peripheral smear and instructed patient should there be any concerns, I will notify him with further recommendations.         ANTONIO Franklin  06/05/19  10:54 AM    Time: 45 minutes were spent reviewing labs, history, evaluating patient and discharge planning.

## 2019-06-06 ENCOUNTER — READMISSION MANAGEMENT (OUTPATIENT)
Dept: CALL CENTER | Facility: HOSPITAL | Age: 59
End: 2019-06-06

## 2019-06-06 LAB
CYTOLOGIST CVX/VAG CYTO: NORMAL
PATH INTERP BLD-IMP: NORMAL

## 2019-06-06 NOTE — OUTREACH NOTE
Prep Survey      Responses   Facility patient discharged from?  [facility]   Is patient eligible?  Yes   Discharge diagnosis  Acute exacerbation of asthma with allergic rhinitis    Does the patient have one of the following disease processes/diagnoses(primary or secondary)?  Other   Does the patient have Home health ordered?  No   Is there a DME ordered?  No   Prep survey completed?  Yes          Edna Clarke RN

## 2019-06-07 ENCOUNTER — READMISSION MANAGEMENT (OUTPATIENT)
Dept: CALL CENTER | Facility: HOSPITAL | Age: 59
End: 2019-06-07

## 2019-06-07 NOTE — OUTREACH NOTE
Medical Week 1 Survey      Responses   Facility patient discharged from?  [facility]   Does the patient have one of the following disease processes/diagnoses(primary or secondary)?  Other   Is there a successful TCM telephone encounter documented?  No   Week 1 attempt successful?  No   Unsuccessful attempts  Attempt 1          Negra Lazcano RN

## 2019-06-10 ENCOUNTER — READMISSION MANAGEMENT (OUTPATIENT)
Dept: CALL CENTER | Facility: HOSPITAL | Age: 59
End: 2019-06-10

## 2019-06-10 NOTE — OUTREACH NOTE
Medical Week 1 Survey      Responses   Facility patient discharged from?  [facility]   Does the patient have one of the following disease processes/diagnoses(primary or secondary)?  Other   Is there a successful TCM telephone encounter documented?  No   Week 1 attempt successful?  No   Unsuccessful attempts  Attempt 2          Eli Montez RN

## 2019-06-12 ENCOUNTER — READMISSION MANAGEMENT (OUTPATIENT)
Dept: CALL CENTER | Facility: HOSPITAL | Age: 59
End: 2019-06-12

## 2019-06-12 NOTE — OUTREACH NOTE
Medical Week 2 Survey      Responses   Facility patient discharged from?  [facility]   Does the patient have one of the following disease processes/diagnoses(primary or secondary)?  Other   Week 2 attempt successful?  No   Unsuccessful attempts  Attempt 1          Emmy Lassiter RN

## 2019-06-13 ENCOUNTER — READMISSION MANAGEMENT (OUTPATIENT)
Dept: CALL CENTER | Facility: HOSPITAL | Age: 59
End: 2019-06-13

## 2019-06-13 NOTE — OUTREACH NOTE
Medical Week 2 Survey      Responses   Facility patient discharged from?  [facility]   Does the patient have one of the following disease processes/diagnoses(primary or secondary)?  Other   Week 2 attempt successful?  No   Unsuccessful attempts  Attempt 2          Luzmaria Arroyo RN

## 2019-06-19 ENCOUNTER — READMISSION MANAGEMENT (OUTPATIENT)
Dept: CALL CENTER | Facility: HOSPITAL | Age: 59
End: 2019-06-19

## 2019-06-19 NOTE — OUTREACH NOTE
Medical Week 3 Survey      Responses   Facility patient discharged from?  [facility]   Does the patient have one of the following disease processes/diagnoses(primary or secondary)?  Other   Week 3 attempt successful?  No   Unsuccessful attempts  Attempt 1          Ottoniel Kent RN

## 2019-06-20 ENCOUNTER — READMISSION MANAGEMENT (OUTPATIENT)
Dept: CALL CENTER | Facility: HOSPITAL | Age: 59
End: 2019-06-20

## 2019-06-20 NOTE — OUTREACH NOTE
Medical Week 3 Survey      Responses   Facility patient discharged from?  [facility]   Does the patient have one of the following disease processes/diagnoses(primary or secondary)?  Other   Week 3 attempt successful?  No   Revoke  Phone number issues [Phone #'s not accepting calls. ]          Danielle Lara RN

## 2019-06-22 ENCOUNTER — HOSPITAL ENCOUNTER (EMERGENCY)
Facility: HOSPITAL | Age: 59
Discharge: HOME | End: 2019-06-22
Attending: EMERGENCY MEDICINE | Admitting: EMERGENCY MEDICINE

## 2019-06-22 ENCOUNTER — APPOINTMENT (OUTPATIENT)
Dept: CT IMAGING | Facility: HOSPITAL | Age: 59
End: 2019-06-22

## 2019-06-22 ENCOUNTER — APPOINTMENT (OUTPATIENT)
Dept: GENERAL RADIOLOGY | Facility: HOSPITAL | Age: 59
End: 2019-06-22

## 2019-06-22 VITALS
DIASTOLIC BLOOD PRESSURE: 82 MMHG | WEIGHT: 300 LBS | OXYGEN SATURATION: 94 % | SYSTOLIC BLOOD PRESSURE: 142 MMHG | RESPIRATION RATE: 20 BRPM | BODY MASS INDEX: 42.95 KG/M2 | HEART RATE: 76 BPM | TEMPERATURE: 98.3 F | HEIGHT: 70 IN

## 2019-06-22 DIAGNOSIS — R53.83 OTHER FATIGUE: Primary | ICD-10-CM

## 2019-06-22 DIAGNOSIS — R41.0 CONFUSION: ICD-10-CM

## 2019-06-22 LAB
ALBUMIN SERPL-MCNC: 3.4 G/DL (ref 3.5–5)
ALBUMIN/GLOB SERPL: 1.2 G/DL (ref 1–2)
ALP SERPL-CCNC: 68 U/L (ref 38–126)
ALT SERPL W P-5'-P-CCNC: 78 U/L (ref 13–69)
ANION GAP SERPL CALCULATED.3IONS-SCNC: 10.6 MMOL/L (ref 10–20)
AST SERPL-CCNC: 47 U/L (ref 15–46)
ATMOSPHERIC PRESS: 735 MMHG
BASE EXCESS BLDV CALC-SCNC: 6.2 MMOL/L (ref 0–2)
BASOPHILS # BLD AUTO: 0.08 10*3/MM3 (ref 0–0.2)
BASOPHILS NFR BLD AUTO: 1 % (ref 0–1.5)
BDY SITE: ABNORMAL
BILIRUB SERPL-MCNC: 0.8 MG/DL (ref 0.2–1.3)
BILIRUB UR QL STRIP: NEGATIVE
BUN BLD-MCNC: 17 MG/DL (ref 7–20)
BUN/CREAT SERPL: 24.3 (ref 6.3–21.9)
CALCIUM SPEC-SCNC: 8.4 MG/DL (ref 8.4–10.2)
CHLORIDE SERPL-SCNC: 101 MMOL/L (ref 98–107)
CLARITY UR: CLEAR
CO2 SERPL-SCNC: 31 MMOL/L (ref 26–30)
COHGB MFR BLD: 1 % (ref 0–5)
COLOR UR: YELLOW
CREAT BLD-MCNC: 0.7 MG/DL (ref 0.6–1.3)
DEPRECATED RDW RBC AUTO: 48.1 FL (ref 37–54)
EOSINOPHIL # BLD AUTO: 0.11 10*3/MM3 (ref 0–0.4)
EOSINOPHIL NFR BLD AUTO: 1.3 % (ref 0.3–6.2)
ERYTHROCYTE [DISTWIDTH] IN BLOOD BY AUTOMATED COUNT: 16.1 % (ref 12.3–15.4)
GFR SERPL CREATININE-BSD FRML MDRD: 116 ML/MIN/1.73
GLOBULIN UR ELPH-MCNC: 2.8 GM/DL
GLUCOSE BLD-MCNC: 159 MG/DL (ref 74–98)
GLUCOSE BLDC GLUCOMTR-MCNC: 167 MG/DL (ref 70–130)
GLUCOSE BLDC GLUCOMTR-MCNC: 190 MG/DL (ref 70–130)
GLUCOSE UR STRIP-MCNC: NEGATIVE MG/DL
HCO3 BLDV-SCNC: 32.6 MMOL/L (ref 22–28)
HCT VFR BLD AUTO: 42.8 % (ref 37.5–51)
HGB BLD-MCNC: 13.5 G/DL (ref 13–17.7)
HGB BLDA-MCNC: 12.9 G/DL (ref 12–18)
HGB UR QL STRIP.AUTO: NEGATIVE
HOLD SPECIMEN: NORMAL
IMM GRANULOCYTES # BLD AUTO: 0.82 10*3/MM3 (ref 0–0.05)
IMM GRANULOCYTES NFR BLD AUTO: 9.9 % (ref 0–0.5)
KETONES UR QL STRIP: NEGATIVE
LEUKOCYTE ESTERASE UR QL STRIP.AUTO: NEGATIVE
LYMPHOCYTES # BLD AUTO: 0.76 10*3/MM3 (ref 0.7–3.1)
LYMPHOCYTES NFR BLD AUTO: 9.1 % (ref 19.6–45.3)
MAGNESIUM SERPL-MCNC: 2.2 MG/DL (ref 1.6–2.3)
MCH RBC QN AUTO: 25.8 PG (ref 26.6–33)
MCHC RBC AUTO-ENTMCNC: 31.5 G/DL (ref 31.5–35.7)
MCV RBC AUTO: 81.8 FL (ref 79–97)
METHGB BLD QL: 1 % (ref 0–3)
MODALITY: ABNORMAL
MONOCYTES # BLD AUTO: 0.38 10*3/MM3 (ref 0.1–0.9)
MONOCYTES NFR BLD AUTO: 4.6 % (ref 5–12)
NEUTROPHILS # BLD AUTO: 6.17 10*3/MM3 (ref 1.7–7)
NEUTROPHILS NFR BLD AUTO: 74.1 % (ref 42.7–76)
NITRITE UR QL STRIP: NEGATIVE
NOTE: ABNORMAL
NRBC BLD AUTO-RTO: 0 /100 WBC (ref 0–0.2)
OXYHGB MFR BLDV: 23.1 % (ref 40–70)
PCO2 BLDV: 54 MM HG (ref 40–50)
PEEP RESPIRATORY: 0 CM[H2O]
PH BLDV: 7.39 PH UNITS (ref 7.32–7.42)
PH UR STRIP.AUTO: 7 [PH] (ref 5–8)
PLATELET # BLD AUTO: 184 10*3/MM3 (ref 140–450)
PMV BLD AUTO: 8.7 FL (ref 6–12)
PO2 BLDV: 17.4 MM HG (ref 30–50)
POTASSIUM BLD-SCNC: 4.6 MMOL/L (ref 3.5–5.1)
PROT SERPL-MCNC: 6.2 G/DL (ref 6.3–8.2)
PROT UR QL STRIP: NEGATIVE
RBC # BLD AUTO: 5.23 10*6/MM3 (ref 4.14–5.8)
RBC MORPH BLD: NORMAL
SAO2 % BLDCOV: 23.6 % (ref 45–75)
SMALL PLATELETS BLD QL SMEAR: ADEQUATE
SODIUM BLD-SCNC: 138 MMOL/L (ref 137–145)
SP GR UR STRIP: 1.01 (ref 1–1.03)
TROPONIN I SERPL-MCNC: <0.012 NG/ML (ref 0–0.03)
UROBILINOGEN UR QL STRIP: NORMAL
VENTILATOR MODE: ABNORMAL
WBC MORPH BLD: NORMAL
WBC NRBC COR # BLD: 8.32 10*3/MM3 (ref 3.4–10.8)
WHOLE BLOOD HOLD SPECIMEN: NORMAL
WHOLE BLOOD HOLD SPECIMEN: NORMAL

## 2019-06-22 PROCEDURE — 82962 GLUCOSE BLOOD TEST: CPT

## 2019-06-22 PROCEDURE — 80053 COMPREHEN METABOLIC PANEL: CPT

## 2019-06-22 PROCEDURE — 71045 X-RAY EXAM CHEST 1 VIEW: CPT

## 2019-06-22 PROCEDURE — 96361 HYDRATE IV INFUSION ADD-ON: CPT

## 2019-06-22 PROCEDURE — 99285 EMERGENCY DEPT VISIT HI MDM: CPT

## 2019-06-22 PROCEDURE — 82820 HEMOGLOBIN-OXYGEN AFFINITY: CPT

## 2019-06-22 PROCEDURE — 93005 ELECTROCARDIOGRAM TRACING: CPT

## 2019-06-22 PROCEDURE — 96360 HYDRATION IV INFUSION INIT: CPT

## 2019-06-22 PROCEDURE — 85025 COMPLETE CBC W/AUTO DIFF WBC: CPT

## 2019-06-22 PROCEDURE — 81003 URINALYSIS AUTO W/O SCOPE: CPT

## 2019-06-22 PROCEDURE — 70450 CT HEAD/BRAIN W/O DYE: CPT

## 2019-06-22 PROCEDURE — 82805 BLOOD GASES W/O2 SATURATION: CPT

## 2019-06-22 PROCEDURE — 84484 ASSAY OF TROPONIN QUANT: CPT

## 2019-06-22 PROCEDURE — 85007 BL SMEAR W/DIFF WBC COUNT: CPT

## 2019-06-22 PROCEDURE — 36416 COLLJ CAPILLARY BLOOD SPEC: CPT

## 2019-06-22 PROCEDURE — 83735 ASSAY OF MAGNESIUM: CPT

## 2019-06-22 RX ORDER — SODIUM CHLORIDE 0.9 % (FLUSH) 0.9 %
10 SYRINGE (ML) INJECTION AS NEEDED
Status: DISCONTINUED | OUTPATIENT
Start: 2019-06-22 | End: 2019-06-22 | Stop reason: HOSPADM

## 2019-06-22 RX ORDER — MECLIZINE HYDROCHLORIDE 25 MG/1
25 TABLET ORAL 3 TIMES DAILY PRN
Qty: 10 TABLET | Refills: 0 | Status: SHIPPED | OUTPATIENT
Start: 2019-06-22 | End: 2022-01-01

## 2019-06-22 RX ORDER — MECLIZINE HYDROCHLORIDE 25 MG/1
25 TABLET ORAL ONCE
Status: COMPLETED | OUTPATIENT
Start: 2019-06-22 | End: 2019-06-22

## 2019-06-22 RX ADMIN — MECLIZINE HYDROCHLORIDE 25 MG: 25 TABLET ORAL at 16:52

## 2019-06-22 RX ADMIN — SODIUM CHLORIDE 500 ML: 9 INJECTION, SOLUTION INTRAVENOUS at 16:52

## 2019-06-22 RX ADMIN — SODIUM CHLORIDE 500 ML: 9 INJECTION, SOLUTION INTRAVENOUS at 14:53

## 2019-06-25 ENCOUNTER — OFFICE VISIT (OUTPATIENT)
Dept: PULMONOLOGY | Facility: CLINIC | Age: 59
End: 2019-06-25

## 2019-06-25 VITALS
DIASTOLIC BLOOD PRESSURE: 84 MMHG | RESPIRATION RATE: 18 BRPM | BODY MASS INDEX: 43.38 KG/M2 | SYSTOLIC BLOOD PRESSURE: 118 MMHG | OXYGEN SATURATION: 96 % | WEIGHT: 303 LBS | HEART RATE: 96 BPM | HEIGHT: 70 IN

## 2019-06-25 DIAGNOSIS — R06.02 SHORTNESS OF BREATH: Primary | ICD-10-CM

## 2019-06-25 DIAGNOSIS — G47.33 OSA (OBSTRUCTIVE SLEEP APNEA): ICD-10-CM

## 2019-06-25 DIAGNOSIS — E66.01 MORBID OBESITY, UNSPECIFIED OBESITY TYPE (HCC): ICD-10-CM

## 2019-06-25 DIAGNOSIS — J45.50 SEVERE PERSISTENT ASTHMA WITHOUT COMPLICATION: ICD-10-CM

## 2019-06-25 DIAGNOSIS — J30.89 OTHER ALLERGIC RHINITIS: ICD-10-CM

## 2019-06-25 PROCEDURE — 99215 OFFICE O/P EST HI 40 MIN: CPT | Performed by: INTERNAL MEDICINE

## 2019-06-25 PROCEDURE — 95012 NITRIC OXIDE EXP GAS DETER: CPT | Performed by: INTERNAL MEDICINE

## 2019-06-25 RX ORDER — ZAFIRLUKAST 20 MG/1
20 TABLET, FILM COATED ORAL 2 TIMES DAILY
Qty: 60 TABLET | Refills: 5 | Status: SHIPPED | OUTPATIENT
Start: 2019-06-25 | End: 2019-08-27 | Stop reason: SDUPTHER

## 2019-06-25 RX ORDER — FLUNISOLIDE 0.25 MG/ML
SOLUTION NASAL
Refills: 0 | COMMUNITY
Start: 2019-06-15 | End: 2019-08-27 | Stop reason: SDUPTHER

## 2019-06-25 RX ORDER — SITAGLIPTIN 100 MG/1
100 TABLET, FILM COATED ORAL DAILY
Refills: 0 | Status: ON HOLD | COMMUNITY
Start: 2019-06-10 | End: 2019-09-14

## 2019-06-25 RX ORDER — BUDESONIDE AND FORMOTEROL FUMARATE DIHYDRATE 80; 4.5 UG/1; UG/1
2 AEROSOL RESPIRATORY (INHALATION)
Qty: 1 INHALER | Refills: 0 | COMMUNITY
Start: 2019-06-25 | End: 2019-07-16 | Stop reason: SDUPTHER

## 2019-06-25 NOTE — PROGRESS NOTES
"Chief Complaint   Patient presents with   • Follow-up   • Sleeping Problem       Subjective   Denzel Harding is a 59 y.o. male.     History of Present Illness   Patient comes in today to follow-up on shortness of breath, severe asthma, allergic rhinitis and obstructive sleep apnea.    The patient says that although he is feeling somewhat better than before, he continues to be short of breath with minimal exertion.  He is also currently on steroid taper and has noticed slight worsening since the dose of steroid has been lowered.      He once again recently went back to the emergency room but this time it was for dizziness.    He had underwent ABG which was apparently unremarkable.    He continues to use his BiPAP on a regular basis and although he does continue to feel somewhat rested in the morning, he feels that the tiredness sets in around 12 noon and then he also starts noticing weakness and shortness of breath.      His symptoms of allergic rhinitis are under decent control although he does have occasional runny nose.  He continues to have 2 dogs at home but denies any mold      The following portions of the patient's history were reviewed and updated as appropriate: allergies, current medications, past family history, past medical history, past social history and past surgical history.    Review of Systems   HENT: Positive for sore throat. Negative for sinus pressure and sneezing.    Respiratory: Positive for cough, shortness of breath and wheezing.        Objective   Visit Vitals  /84   Pulse 96   Resp 18   Ht 177.8 cm (70\")   Wt (!) 137 kg (303 lb)   SpO2 96%   BMI 43.48 kg/m²       Physical Exam   Constitutional: He is oriented to person, place, and time. He appears well-developed and well-nourished.   HENT:   Head: Atraumatic.   Crowded oropharynx.    Eyes: EOM are normal.   Neck: Normal range of motion. No JVD present. No thyromegaly present.   Cardiovascular: Normal rate.   Pulmonary/Chest: No " respiratory distress. He has wheezes.   Musculoskeletal: Normal range of motion.   Used a cane.    Neurological: He is alert and oriented to person, place, and time.   Skin: Skin is warm and dry.   Psychiatric: He has a normal mood and affect. His behavior is normal.   Vitals reviewed.        Assessment/Plan   Denzel was seen today for follow-up and sleeping problem.    Diagnoses and all orders for this visit:    Shortness of breath  -     CBC Auto Differential; Future  -     Nitric Oxide  -     Peak Flow    Severe persistent asthma without complication  -     CBC Auto Differential; Future  -     Nitric Oxide  -     Peak Flow    Other allergic rhinitis  -     IgE; Future  -     Allergens, Zone 8; Future    EDD (obstructive sleep apnea)    Morbid obesity, unspecified obesity type (CMS/HCC)    Other orders  -     budesonide-formoterol (SYMBICORT) 80-4.5 MCG/ACT inhaler; Inhale 2 puffs 2 (Two) Times a Day. Rinse mouth with water after use.  -     zafirlukast (ACCOLATE) 20 MG tablet; Take 1 tablet by mouth 2 (Two) Times a Day.           Return in about 8 weeks (around 8/20/2019) for Recheck, Labs, For Matilde.    DISCUSSION (if any):  I have reviewed the patient's imaging studies and shared them with him.  Last CT scan was reviewed in great detail with the patient.  Results for orders placed during the hospital encounter of 05/22/19   CT Chest Pulmonary Embolism With Contrast    Narrative PROCEDURE: CT CHEST PULMONARY EMBOLISM W CONTRAST-     HISTORY: Shortness of breath; Z74.09-Other reduced mobility;  Z74.09-Other reduced mobility     TECHNIQUE: Thin section axial CT with IV contrast supplemented with3D  reconstructed MIP images.     FINDINGS:     Pulmonary vessels enhance in a normal fashion without evidence of  embolic disease. Thoracic aorta is normal in caliber without evidence of  aneurysm or dissection.     Mild bilateral lower lobe atelectasis is present..  Trace bilateral  pleural effusions are noted.. No  adenopathy or mass lesion is present.       Impression 1. No evidence of pulmonary embolism.  2. Mild bilateral lower lobe atelectasis and trace bilateral pleural  effusions           This study was performed with techniques to keep radiation doses as low  as reasonably achievable (ALARA). Individualized dose reduction  techniques using automated exposure control or adjustment of vA and/or  kV according to the patient size were employed.      This report was finalized on 5/30/2019 11:23 AM by Phoenix Bella MD.       Last chest x-ray was reviewed personally and the results were shared with the patient.   Results for orders placed during the hospital encounter of 06/22/19   XR Chest 1 View    Narrative PROCEDURE: XR CHEST 1 VW-     HISTORY: Weak/Dizzy/AMS triage protocol        COMPARISON: 06/01/2019.     FINDINGS:  Cardiomegaly is noted. Postoperative changes are seen from  median sternotomy.Worsening left lung base opacities are seen consistent  with worsening atelectasis or pneumonia. There is no pneumothorax. The  bony thorax in intact.       Impression Worsening left lung base atelectasis or pneumonia.           This report was finalized on 6/22/2019 2:22 PM by Rohit Kerr MD.       I have also reviewed his last discharge summary that mentions acute exacerbation of asthma with allergic rhinitis.  It also mentioned hypertension and non-insulin-dependent diabetes    I also reviewed his last echocardiogram and shared the results with him.   Results for orders placed during the hospital encounter of 05/22/19   Adult Transthoracic Echo Complete W/ Cont if Necessary Per Protocol    Narrative Technically very limited study   1) Mild LVH with normal LV systolic function ( EF is over 55%)  2) Mild left atrial enlargement   3) Trace MR and TR   4) Normal RV function        ===========================  ===========================    Laboratory workup was also reviewed which showed   Absolute Eosinophil count has ranged  "from 210 (5/22/19) - 900 (9/2/2017)     Lab Results   Component Value Date     (A) 06/17/2016     Laboratory workup also showed   Lab Results   Component Value Date    CO2 31.0 (H) 06/22/2019     FeNO level was 39 today.    Peak flow was 450 LPM today.    It appears that his symptoms of asthma are under poor control with the current regimen.    Patient's medications for underlying asthma were reviewed in great detail.    Any needed adjustments to his pulmonary medications, either for clinical or insurance coverage reasons, have been made and are reflected in the orders.    He can't use ICS due to \"rash in mouth\" but I have provided him with sample of Symbicort 80/4.5 mcg BID. Rinse mouth with water after use.     He CAN NOT use Breo, as it always causes rash and he also \"hates the powder\".     Compliance with medications stressed.     Side effects of prescribed medications discussed with the patient.    I have discussed asthma action plan with him.    The patient was asked to call this office if the symptoms worsen.    I will also decided to change him from Singulair to Accolate.     I have asked him to undergo testing with IgE/RAST and CBC, 1 week off steroids.     Due to symptoms suggestive of severe poorly controlled asthma, with elevated  absolute eosinophil levels, and documented exacerbations in the recent past, it is likely that he will benefit from benralizumab, mepolizumab or dupillumab.      It should be noted that his previous eosinophil counts have been as high as 900, on 2 September 2017.  His latest elevated neutrophil count was 210, in May 2019    Side effects will be discussed in great detail, after reviewing the laboratory work-up and deciding about which agent to try.     He was advised to continue BiPAP at the current setting of 13/5.    His latest ABG showed a PCO2 of 40 with a pH of 7.44, that confirms good response to his BiPAP.    Dictated utilizing Dragon dictation.    This document " was electronically signed by Linda Barajas MD on 06/25/19 at 12:21 PM

## 2019-07-15 ENCOUNTER — LAB (OUTPATIENT)
Dept: LAB | Facility: HOSPITAL | Age: 59
End: 2019-07-15

## 2019-07-15 DIAGNOSIS — J30.89 OTHER ALLERGIC RHINITIS: ICD-10-CM

## 2019-07-15 DIAGNOSIS — R06.02 SHORTNESS OF BREATH: ICD-10-CM

## 2019-07-15 DIAGNOSIS — J45.50 SEVERE PERSISTENT ASTHMA WITHOUT COMPLICATION: ICD-10-CM

## 2019-07-15 LAB
BASOPHILS # BLD AUTO: 0.08 10*3/MM3 (ref 0–0.2)
BASOPHILS NFR BLD AUTO: 1 % (ref 0–1.5)
DEPRECATED RDW RBC AUTO: 50.6 FL (ref 37–54)
EOSINOPHIL # BLD AUTO: 0.31 10*3/MM3 (ref 0–0.4)
EOSINOPHIL NFR BLD AUTO: 3.8 % (ref 0.3–6.2)
ERYTHROCYTE [DISTWIDTH] IN BLOOD BY AUTOMATED COUNT: 17.2 % (ref 12.3–15.4)
HCT VFR BLD AUTO: 42.9 % (ref 37.5–51)
HGB BLD-MCNC: 13.6 G/DL (ref 13–17.7)
IMM GRANULOCYTES # BLD AUTO: 0.2 10*3/MM3 (ref 0–0.05)
IMM GRANULOCYTES NFR BLD AUTO: 2.4 % (ref 0–0.5)
LYMPHOCYTES # BLD AUTO: 1.52 10*3/MM3 (ref 0.7–3.1)
LYMPHOCYTES NFR BLD AUTO: 18.4 % (ref 19.6–45.3)
MCH RBC QN AUTO: 26.2 PG (ref 26.6–33)
MCHC RBC AUTO-ENTMCNC: 31.7 G/DL (ref 31.5–35.7)
MCV RBC AUTO: 82.7 FL (ref 79–97)
MONOCYTES # BLD AUTO: 0.47 10*3/MM3 (ref 0.1–0.9)
MONOCYTES NFR BLD AUTO: 5.7 % (ref 5–12)
NEUTROPHILS # BLD AUTO: 5.67 10*3/MM3 (ref 1.7–7)
NEUTROPHILS NFR BLD AUTO: 68.7 % (ref 42.7–76)
NRBC BLD AUTO-RTO: 0 /100 WBC (ref 0–0.2)
PLATELET # BLD AUTO: 223 10*3/MM3 (ref 140–450)
PMV BLD AUTO: 10.3 FL (ref 6–12)
RBC # BLD AUTO: 5.19 10*6/MM3 (ref 4.14–5.8)
WBC NRBC COR # BLD: 8.25 10*3/MM3 (ref 3.4–10.8)

## 2019-07-15 PROCEDURE — 86003 ALLG SPEC IGE CRUDE XTRC EA: CPT

## 2019-07-15 PROCEDURE — 82785 ASSAY OF IGE: CPT

## 2019-07-15 PROCEDURE — 85025 COMPLETE CBC W/AUTO DIFF WBC: CPT

## 2019-07-15 PROCEDURE — 36415 COLL VENOUS BLD VENIPUNCTURE: CPT

## 2019-07-16 RX ORDER — BUDESONIDE AND FORMOTEROL FUMARATE DIHYDRATE 80; 4.5 UG/1; UG/1
2 AEROSOL RESPIRATORY (INHALATION)
Qty: 1 INHALER | Refills: 5 | COMMUNITY
Start: 2019-07-16 | End: 2019-07-25 | Stop reason: SDUPTHER

## 2019-07-18 LAB
A ALTERNATA IGE QN: 0.1 KU/L
A FUMIGATUS IGE QN: 0.33 KU/L
AMER ROACH IGE QN: <0.1 KU/L
BAHIA GRASS IGE QN: <0.1 KU/L
BERMUDA GRASS IGE QN: <0.1 KU/L
BOXELDER IGE QN: <0.1 KU/L
C HERBARUM IGE QN: 0.18 KU/L
CAT DANDER IGG QN: 2.77 KU/L
CMN PIGWEED IGE QN: <0.1 KU/L
COMMON RAGWEED IGE QN: <0.1 KU/L
CONV CLASS DESCRIPTION: ABNORMAL
D FARINAE IGE QN: 0.34 KU/L
D PTERONYSS IGE QN: 0.12 KU/L
DOG DANDER IGE QN: 3.65 KU/L
ENGL PLANTAIN IGE QN: 0.1 KU/L
HAZELNUT POLN IGE QN: <0.1 KU/L
JOHNSON GRASS IGE QN: <0.1 KU/L
KENT BLUE GRASS IGE QN: <0.1 KU/L
M RACEMOSUS IGE QN: 0.21 KU/L
MT JUNIPER IGE QN: 0.24 KU/L
MUGWORT IGE QN: <0.1 KU/L
NETTLE IGE QN: 0.13 KU/L
P NOTATUM IGE QN: 0.39 KU/L
S BOTRYOSUM IGE QN: 0.55 KU/L
SHEEP SORREL IGE QN: <0.1 KU/L
SWEET GUM IGE QN: 0.12 KU/L
T011-IGE MAPLE LEAF SYCAMORE: 0.13 KU/L
TOTAL IGE SMQN RAST: 867 IU/ML (ref 6–495)
WHITE ELM IGE QN: <0.1 KU/L
WHITE HICKORY IGE QN: 0.1 KU/L
WHITE MULBERRY IGE QN: 0.12 KU/L
WHITE OAK IGE QN: <0.1 KU/L

## 2019-07-18 RX ORDER — RANOLAZINE 500 MG/1
500 TABLET, EXTENDED RELEASE ORAL 2 TIMES DAILY
Qty: 60 TABLET | Refills: 1 | Status: SHIPPED | OUTPATIENT
Start: 2019-07-18 | End: 2019-09-12 | Stop reason: SDUPTHER

## 2019-07-23 ENCOUNTER — OFFICE VISIT (OUTPATIENT)
Dept: PULMONOLOGY | Facility: CLINIC | Age: 59
End: 2019-07-23

## 2019-07-23 VITALS
SYSTOLIC BLOOD PRESSURE: 120 MMHG | HEART RATE: 101 BPM | WEIGHT: 315 LBS | HEIGHT: 70 IN | DIASTOLIC BLOOD PRESSURE: 62 MMHG | OXYGEN SATURATION: 95 % | BODY MASS INDEX: 45.1 KG/M2 | RESPIRATION RATE: 18 BRPM

## 2019-07-23 DIAGNOSIS — J30.89 OTHER ALLERGIC RHINITIS: ICD-10-CM

## 2019-07-23 DIAGNOSIS — G47.33 OSA (OBSTRUCTIVE SLEEP APNEA): ICD-10-CM

## 2019-07-23 DIAGNOSIS — J45.50 SEVERE PERSISTENT ASTHMA WITHOUT COMPLICATION: ICD-10-CM

## 2019-07-23 DIAGNOSIS — J45.51 SEVERE PERSISTENT ASTHMA WITH ACUTE EXACERBATION: Primary | ICD-10-CM

## 2019-07-23 PROCEDURE — 99214 OFFICE O/P EST MOD 30 MIN: CPT | Performed by: NURSE PRACTITIONER

## 2019-07-23 RX ORDER — MOMETASONE FUROATE 100 UG/1
AEROSOL RESPIRATORY (INHALATION)
Refills: 0 | COMMUNITY
Start: 2019-07-12 | End: 2019-08-27 | Stop reason: SDUPTHER

## 2019-07-23 RX ORDER — FLUCONAZOLE 200 MG/1
TABLET ORAL
Refills: 0 | COMMUNITY
Start: 2019-07-08 | End: 2019-07-23 | Stop reason: SDUPTHER

## 2019-07-23 RX ORDER — CLOTRIMAZOLE AND BETAMETHASONE DIPROPIONATE 10; .64 MG/G; MG/G
CREAM TOPICAL
Refills: 0 | Status: ON HOLD | COMMUNITY
Start: 2019-07-08 | End: 2019-09-14

## 2019-07-23 NOTE — PROGRESS NOTES
"Chief Complaint   Patient presents with   • Follow-up   • Shortness of Breath         Subjective   Denzel Harding is a 59 y.o. male.     History of Present Illness   The patient comes in today for follow up asthma and allergic rhinitis.     He continues using Symbicort, Spiriva, Accolate and astelin as directed.     He uses BiPAP nightly.     He continues to have uncontrolled shortness of breath and has to use the rescue inhaler and nebulizer often 3-4 times a day.    The following portions of the patient's history were reviewed and updated as appropriate: allergies, current medications, past family history, past medical history, past social history and past surgical history.    Review of Systems   Constitutional: Negative for chills and fever.   HENT: Positive for sore throat. Negative for rhinorrhea and sinus pressure.    Respiratory: Positive for cough, shortness of breath and wheezing. Negative for chest tightness.    Psychiatric/Behavioral: Negative for sleep disturbance.       Objective   Visit Vitals  /62   Pulse 101   Resp 18   Ht 177.8 cm (70\")   Wt (!) 143 kg (316 lb)   SpO2 95%   BMI 45.34 kg/m²     Physical Exam   Constitutional: He is oriented to person, place, and time. He appears well-developed and well-nourished.   HENT:   Head: Normocephalic and atraumatic.   Crowded oropharynx.    Eyes: EOM are normal.   Neck: Neck supple.   Cardiovascular: Normal rate and regular rhythm.   Pulmonary/Chest: Effort normal. No respiratory distress.   Somewhat decreased A/E without wheezing noted.   Musculoskeletal: He exhibits no edema.   Gait with cane.    Neurological: He is alert and oriented to person, place, and time.   Skin: Skin is warm and dry.   Psychiatric: He has a normal mood and affect.   Vitals reviewed.          Assessment/Plan   Denzel was seen today for follow-up and shortness of breath.    Diagnoses and all orders for this visit:    Severe persistent asthma with acute exacerbation    Severe " persistent asthma without complication  -     Dupilumab solution prefilled syringe 300 mg    Other allergic rhinitis    EDD (obstructive sleep apnea)           Return keep f/u in august with me.    DISCUSSION (if any):  I have reviewed his lab work and discussed the results with him today. Absolute Eosinophils 310. IgE 867. RAST is positive for multiple allergens.    He continues to have uncontrolled asthma symptoms even with maximum inhaled therapy so we have discussed the addition of Dupixent and the patient is willing to try the medication.    He should continue to use the above medication as directed.     He has benefited from BiPAP and should continue to use it nightly.       Dictated utilizing Dragon dictation.    This document was electronically signed by ANTONIO Ramírez July 23, 2019  4:06 PM

## 2019-07-25 RX ORDER — BUDESONIDE AND FORMOTEROL FUMARATE DIHYDRATE 80; 4.5 UG/1; UG/1
2 AEROSOL RESPIRATORY (INHALATION)
Qty: 1 INHALER | Refills: 5 | Status: SHIPPED | OUTPATIENT
Start: 2019-07-25 | End: 2019-08-27 | Stop reason: SDUPTHER

## 2019-08-17 ENCOUNTER — APPOINTMENT (OUTPATIENT)
Dept: GENERAL RADIOLOGY | Facility: HOSPITAL | Age: 59
End: 2019-08-17

## 2019-08-17 ENCOUNTER — APPOINTMENT (OUTPATIENT)
Dept: CT IMAGING | Facility: HOSPITAL | Age: 59
End: 2019-08-17

## 2019-08-17 ENCOUNTER — HOSPITAL ENCOUNTER (EMERGENCY)
Facility: HOSPITAL | Age: 59
Discharge: HOME | End: 2019-08-17
Attending: EMERGENCY MEDICINE | Admitting: EMERGENCY MEDICINE

## 2019-08-17 VITALS
DIASTOLIC BLOOD PRESSURE: 71 MMHG | HEIGHT: 70 IN | BODY MASS INDEX: 45.1 KG/M2 | OXYGEN SATURATION: 93 % | RESPIRATION RATE: 20 BRPM | HEART RATE: 81 BPM | SYSTOLIC BLOOD PRESSURE: 131 MMHG | WEIGHT: 315 LBS | TEMPERATURE: 98.3 F

## 2019-08-17 DIAGNOSIS — R07.9 CHEST PAIN, UNSPECIFIED TYPE: ICD-10-CM

## 2019-08-17 DIAGNOSIS — R00.2 PALPITATIONS: Primary | ICD-10-CM

## 2019-08-17 LAB
ALBUMIN SERPL-MCNC: 3.8 G/DL (ref 3.5–5.2)
ALBUMIN/GLOB SERPL: 1.5 G/DL
ALP SERPL-CCNC: 69 U/L (ref 39–117)
ALT SERPL W P-5'-P-CCNC: 45 U/L (ref 1–41)
ANION GAP SERPL CALCULATED.3IONS-SCNC: 12.2 MMOL/L (ref 5–15)
AST SERPL-CCNC: 37 U/L (ref 1–40)
BASOPHILS # BLD AUTO: 0.05 10*3/MM3 (ref 0–0.2)
BASOPHILS NFR BLD AUTO: 0.8 % (ref 0–1.5)
BILIRUB SERPL-MCNC: 1 MG/DL (ref 0.2–1.2)
BUN BLD-MCNC: 12 MG/DL (ref 6–20)
BUN/CREAT SERPL: 14.3 (ref 7–25)
CALCIUM SPEC-SCNC: 8.9 MG/DL (ref 8.6–10.5)
CHLORIDE SERPL-SCNC: 101 MMOL/L (ref 98–107)
CO2 SERPL-SCNC: 25.8 MMOL/L (ref 22–29)
CREAT BLD-MCNC: 0.84 MG/DL (ref 0.76–1.27)
DEPRECATED RDW RBC AUTO: 47.6 FL (ref 37–54)
EOSINOPHIL # BLD AUTO: 0.34 10*3/MM3 (ref 0–0.4)
EOSINOPHIL NFR BLD AUTO: 5.3 % (ref 0.3–6.2)
ERYTHROCYTE [DISTWIDTH] IN BLOOD BY AUTOMATED COUNT: 15.6 % (ref 12.3–15.4)
GFR SERPL CREATININE-BSD FRML MDRD: 94 ML/MIN/1.73
GLOBULIN UR ELPH-MCNC: 2.6 GM/DL
GLUCOSE BLD-MCNC: 255 MG/DL (ref 65–99)
HCT VFR BLD AUTO: 39.5 % (ref 37.5–51)
HGB BLD-MCNC: 12.8 G/DL (ref 13–17.7)
IMM GRANULOCYTES # BLD AUTO: 0.09 10*3/MM3 (ref 0–0.05)
IMM GRANULOCYTES NFR BLD AUTO: 1.4 % (ref 0–0.5)
LYMPHOCYTES # BLD AUTO: 1.36 10*3/MM3 (ref 0.7–3.1)
LYMPHOCYTES NFR BLD AUTO: 21.2 % (ref 19.6–45.3)
MCH RBC QN AUTO: 26.9 PG (ref 26.6–33)
MCHC RBC AUTO-ENTMCNC: 32.4 G/DL (ref 31.5–35.7)
MCV RBC AUTO: 83 FL (ref 79–97)
MONOCYTES # BLD AUTO: 0.34 10*3/MM3 (ref 0.1–0.9)
MONOCYTES NFR BLD AUTO: 5.3 % (ref 5–12)
NEUTROPHILS # BLD AUTO: 4.25 10*3/MM3 (ref 1.7–7)
NEUTROPHILS NFR BLD AUTO: 66 % (ref 42.7–76)
NRBC BLD AUTO-RTO: 0 /100 WBC (ref 0–0.2)
PLATELET # BLD AUTO: 177 10*3/MM3 (ref 140–450)
PMV BLD AUTO: 9.8 FL (ref 6–12)
POTASSIUM BLD-SCNC: 4.4 MMOL/L (ref 3.5–5.2)
PROT SERPL-MCNC: 6.4 G/DL (ref 6–8.5)
RBC # BLD AUTO: 4.76 10*6/MM3 (ref 4.14–5.8)
SODIUM BLD-SCNC: 139 MMOL/L (ref 136–145)
TROPONIN T SERPL-MCNC: <0.01 NG/ML (ref 0–0.03)
TROPONIN T SERPL-MCNC: <0.01 NG/ML (ref 0–0.03)
WBC NRBC COR # BLD: 6.43 10*3/MM3 (ref 3.4–10.8)

## 2019-08-17 PROCEDURE — 93005 ELECTROCARDIOGRAM TRACING: CPT | Performed by: NURSE PRACTITIONER

## 2019-08-17 PROCEDURE — 71046 X-RAY EXAM CHEST 2 VIEWS: CPT

## 2019-08-17 PROCEDURE — 71275 CT ANGIOGRAPHY CHEST: CPT

## 2019-08-17 PROCEDURE — 84484 ASSAY OF TROPONIN QUANT: CPT | Performed by: NURSE PRACTITIONER

## 2019-08-17 PROCEDURE — 99284 EMERGENCY DEPT VISIT MOD MDM: CPT

## 2019-08-17 PROCEDURE — 25010000002 KETOROLAC TROMETHAMINE PER 15 MG: Performed by: NURSE PRACTITIONER

## 2019-08-17 PROCEDURE — 80053 COMPREHEN METABOLIC PANEL: CPT | Performed by: NURSE PRACTITIONER

## 2019-08-17 PROCEDURE — 96374 THER/PROPH/DIAG INJ IV PUSH: CPT

## 2019-08-17 PROCEDURE — 25010000002 IOPAMIDOL 61 % SOLUTION: Performed by: EMERGENCY MEDICINE

## 2019-08-17 PROCEDURE — 85025 COMPLETE CBC W/AUTO DIFF WBC: CPT | Performed by: NURSE PRACTITIONER

## 2019-08-17 RX ORDER — KETOROLAC TROMETHAMINE 30 MG/ML
30 INJECTION, SOLUTION INTRAMUSCULAR; INTRAVENOUS ONCE
Status: COMPLETED | OUTPATIENT
Start: 2019-08-17 | End: 2019-08-17

## 2019-08-17 RX ORDER — ASPIRIN 325 MG
325 TABLET ORAL ONCE
Status: COMPLETED | OUTPATIENT
Start: 2019-08-17 | End: 2019-08-17

## 2019-08-17 RX ADMIN — IOPAMIDOL 100 ML: 612 INJECTION, SOLUTION INTRAVENOUS at 17:15

## 2019-08-17 RX ADMIN — ASPIRIN 325 MG ORAL TABLET 325 MG: 325 PILL ORAL at 17:14

## 2019-08-17 RX ADMIN — KETOROLAC TROMETHAMINE 30 MG: 30 INJECTION, SOLUTION INTRAMUSCULAR at 17:43

## 2019-08-17 NOTE — ED PROVIDER NOTES
Subjective   History of Present Illness  This is a 59-year-old gentleman who has a history of coronary artery disease status post CABG in 2017.  He also has a history of COPD, congestive heart failure, diabetes mellitus who reports that for the last week or so he is been having palpitations.  He said they mainly happen when he is at rest.  It will feel like it takes his breath away.  He said sometimes he has an associated sharp stabbing pain with it.  He denies any chest pain pressure or palpitations when he is exerting.  He may also noticed some more at night.  At baseline he is somewhat short of breath and that really has not worsened a lot.  He denies fevers or chills.  He states that he has been coughing up some thick colored secretions.  He denies fevers or chills.  He denies nausea vomiting or diarrhea.  Review of Systems   All other systems reviewed and are negative.      Past Medical History:   Diagnosis Date   • Anxiety    • Arthritis    • Asthma    • BiPAP (biphasic positive airway pressure) dependence    • Brachial neuritis 1/19/2017   • Chest pain    • CHF (congestive heart failure) (CMS/[facility])    • COPD (chronic obstructive pulmonary disease) (CMS/[facility])    • Coronary artery disease    • Depression    • Diabetes mellitus (CMS/[facility])    • Dizziness    • Edema    • GERD (gastroesophageal reflux disease)    • H/O chest x-ray 07/04/2015    Mild Left base atelectasis   • H/O echocardiogram 07/05/2015    Normal LVSF. EF of 60-65%. Grade 1 diastolic dysfunction of the LV myocardium. No evidence of pericardial effusion   • H/O exercise stress test    • History of herniated intervertebral disc     History of left L5-S1 disc herniation   • Hypertension    • LOM (loss of memory) 1/19/2017   • Lower back pain     Right   • Measles    • Obstructive sleep apnea    • Palpitations    • Pericardial effusion    • Seizure disorder (CMS/HCC)    • Seizures (CMS/[facility])     FOCAL    • Shortness of breath 1/19/2017   • SOB (shortness  of breath)    • Stroke (CMS/HCC)    • Wears glasses        Allergies   Allergen Reactions   • Sulfa Antibiotics Anaphylaxis, Nausea And Vomiting and Delirium   • Codeine Nausea And Vomiting     Can only take with Phenergan   • Invokana [Canagliflozin]      DKA   • Morphine      Does not work. It causes pain       Past Surgical History:   Procedure Laterality Date   • BACK SURGERY     • CARDIAC CATHETERIZATION     • CARDIAC CATHETERIZATION N/A 8/11/2017    Procedure: Coronary angiography;  Surgeon: Dallas Kim MD;  Location: [facility] CATH INVASIVE LOCATION;  Service:    • CARDIAC CATHETERIZATION N/A 8/11/2017    Procedure: Left Heart Cath;  Surgeon: Dallas Kim MD;  Location: [facility] CATH INVASIVE LOCATION;  Service:    • CARDIAC CATHETERIZATION N/A 8/11/2017    Procedure: Left ventriculography;  Surgeon: Dallas Kim MD;  Location: [facility] CATH INVASIVE LOCATION;  Service:    • CARDIOVASCULAR STRESS TEST  07/03/2017    WITH DR KIM AT [facility]   • CARPAL TUNNEL RELEASE Right    • CATARACT EXTRACTION W/ INTRAOCULAR LENS IMPLANT Right 6/12/2017    Procedure: CATARACT PHACO EXTRACTION WITH INTRAOCULAR LENS IMPLANT RIGHT ;  Surgeon: Natalie Cao MD;  Location: [facility] OR;  Service:    • CATARACT EXTRACTION W/ INTRAOCULAR LENS IMPLANT Left 7/10/2017    Procedure: CATARACT PHACO EXTRACTION WITH INTRAOCULAR LENS IMPLANT LEFT;  Surgeon: Natalie Cao MD;  Location: [facility] OR;  Service:    • CHOLECYSTECTOMY     • CHOLECYSTECTOMY     • COLONOSCOPY     • CORONARY ANGIOPLASTY WITH STENT PLACEMENT     • CORONARY ANGIOPLASTY WITH STENT PLACEMENT      X1-LAD   • CORONARY ARTERY BYPASS GRAFT N/A 8/15/2017    Procedure: CORONARY ARTERY BYPASS GRAFTING x 3 UTILIZING THE LEFT INTERNAL MAMMARY ARTERY WITH ENDOSCOPIC VEIN HARVESTING OF THE RIGHT GREATER SAPHENOUS VEIN, HAMLET, LAD ENDARECTOMY;  Surgeon: London Damon MD;  Location: [facility] OR;  Service:    • ENDOSCOPY     • EYE SURGERY      cataract surgery both eyes   • KNEE  ARTHROSCOPY Bilateral    • NEUROPLASTY / TRANSPOSITION ULNAR NERVE AT ELBOW     • OTHER SURGICAL HISTORY      Foraminotomy and discectomy   • PERICARDIAL WINDOW N/A 2017    Procedure: PERICARDIAL WINDOW;  Surgeon: Freeman Phillips MD;  Location: [facility] OR;  Service:        Family History   Problem Relation Age of Onset   • Hypertension Mother    • Arthritis Mother    • Alcohol abuse Father    • Heart disease Other    • Stroke Other    • Hypertension Other    • Other Other         Neurologic disorder   • Parkinsonism Other    • Stroke Other    • Heart disease Other    • Hypertension Other    • Heart disease Other    • Stroke Other    • Hypertension Other        Social History     Socioeconomic History   • Marital status:      Spouse name: Not on file   • Number of children: 1   • Years of education: Not on file   • Highest education level: Not on file   Occupational History   • Occupation: [employer]     Comment: Disabled since -Back Problems   Tobacco Use   • Smoking status: Former Smoker     Packs/day: 1.00     Years: 10.00     Pack years: 10.00     Types: Cigarettes     Last attempt to quit: 1998     Years since quittin.6   • Smokeless tobacco: Former User     Types: Snuff     Quit date: 1997   Substance and Sexual Activity   • Alcohol use: Yes     Comment: 3 PER YEAR   • Drug use: No   • Sexual activity: Defer   Social History Narrative    Caffeine use: 0 serving daily.    Patient lives at home with wife.            Objective   Physical Exam   Constitutional: He is oriented to person, place, and time. He appears well-developed and well-nourished.   Cardiovascular: Normal rate, regular rhythm, normal heart sounds and intact distal pulses.   No murmur heard.  Pulmonary/Chest: Effort normal and breath sounds normal. He has no wheezes.   Slightly decreased in the bases bilaterally.   Abdominal: Soft. Bowel sounds are normal. There is no tenderness.   Musculoskeletal:  Normal range of motion. He exhibits no edema.   Neurological: He is alert and oriented to person, place, and time.   Skin: Skin is warm and dry. Capillary refill takes less than 2 seconds.   Psychiatric: He has a normal mood and affect. His behavior is normal. Judgment and thought content normal.   Nursing note and vitals reviewed.      Procedures           ED Course  ED Course as of Aug 18 1817   Sat Aug 17, 2019   1407 EKG interpreted by me: Sinus rhythm, normal rate, no acute ST/T changes, this is a normal EKG  [MP]   1536 Patient is indicating that he is having thumping pressure pain again.  I will get an EKG at this time.  His troponin was negative.  He is due for a second troponin.  [CM]   1544 Repeat EKG interpreted by me: Sinus rhythm, normal rate, no acute ST/T changes, this is a normal EKG  [MP]   1721 PROCEDURE: XR CHEST 2 VW-     INDICATION: Palpitations     COMPARISON:  06/22/2019.     FINDINGS: PA and lateral views of the chest were obtained.   Patient is  status post median sternotomy. Cardiac and mediastinal silhouettes are  within normal limits.   Linear opacity in the left lower lobe is likely  atelectasis or scar. The lungs are otherwise clear.  There is no  pneumothorax or pleural effusion.  No acute osseous abnormality is  identified.     IMPRESSION:  Linear atelectasis or scar in the left lower lobe.  Otherwise, no acute cardiac or pulmonary disease.     [CM]   1824 Patient states that the Toradol took his pain away.  He had one since of a palpitation while he was here and I went back through his telemetry and I do not see any arrhythmias.  I think that by his description he is probably having some PVCs.  He is somewhat tender when I palpate his chest wall.  I recommended that he limit his caffeine intake and follow-up with his cardiologist on Monday.  If the symptoms return or change in any way they can always return to the emergency department.  He and his wife stated understanding.  [CM]       ED Course User Index  [CM] Tiffanie Mayorga APRN  [MP] Adonay Pabon MD                  [facility]      Final diagnoses:   Palpitations   Chest pain, unspecified type            Tiffanie Mayorga APRN  08/18/19 1817

## 2019-08-20 ENCOUNTER — TELEPHONE (OUTPATIENT)
Dept: CARDIOLOGY | Facility: CLINIC | Age: 59
End: 2019-08-20

## 2019-08-20 DIAGNOSIS — R00.2 PALPITATIONS: Primary | ICD-10-CM

## 2019-08-20 RX ORDER — AMLODIPINE BESYLATE 5 MG/1
TABLET ORAL
Qty: 45 TABLET | Refills: 3 | Status: ON HOLD | OUTPATIENT
Start: 2019-08-20 | End: 2020-08-05

## 2019-08-20 NOTE — TELEPHONE ENCOUNTER
Pt called because he went to the [facility] ER on 08/17 with palpitations that feels like it takes his breath away and sometimes has a sharp stabbing pain with it. Baseline short of breath with his COPD. EKG and troponin both negative. They told him he is probably having some PVC's. Pt has a f/u apt with [facility] on 09/09. Would you like the pt to come in sooner or have a monitor placed before he comes in for f/u?

## 2019-08-27 ENCOUNTER — OFFICE VISIT (OUTPATIENT)
Dept: PULMONOLOGY | Facility: CLINIC | Age: 59
End: 2019-08-27

## 2019-08-27 VITALS
WEIGHT: 315 LBS | RESPIRATION RATE: 18 BRPM | SYSTOLIC BLOOD PRESSURE: 110 MMHG | HEIGHT: 70 IN | BODY MASS INDEX: 45.1 KG/M2 | OXYGEN SATURATION: 93 % | DIASTOLIC BLOOD PRESSURE: 62 MMHG | HEART RATE: 103 BPM

## 2019-08-27 DIAGNOSIS — R06.02 SOB (SHORTNESS OF BREATH): ICD-10-CM

## 2019-08-27 DIAGNOSIS — G47.33 OSA (OBSTRUCTIVE SLEEP APNEA): ICD-10-CM

## 2019-08-27 DIAGNOSIS — E66.01 MORBID OBESITY, UNSPECIFIED OBESITY TYPE (HCC): ICD-10-CM

## 2019-08-27 DIAGNOSIS — J30.89 OTHER ALLERGIC RHINITIS: ICD-10-CM

## 2019-08-27 DIAGNOSIS — J45.50 SEVERE PERSISTENT ASTHMA WITHOUT COMPLICATION: Primary | ICD-10-CM

## 2019-08-27 PROCEDURE — 99214 OFFICE O/P EST MOD 30 MIN: CPT | Performed by: NURSE PRACTITIONER

## 2019-08-27 PROCEDURE — 94618 PULMONARY STRESS TESTING: CPT | Performed by: NURSE PRACTITIONER

## 2019-08-27 RX ORDER — FLUNISOLIDE 0.25 MG/ML
2 SOLUTION NASAL EVERY 12 HOURS
Qty: 1 BOTTLE | Refills: 1 | Status: ON HOLD | OUTPATIENT
Start: 2019-08-27 | End: 2019-09-14

## 2019-08-27 RX ORDER — BUDESONIDE AND FORMOTEROL FUMARATE DIHYDRATE 80; 4.5 UG/1; UG/1
2 AEROSOL RESPIRATORY (INHALATION)
Qty: 1 INHALER | Refills: 5 | Status: SHIPPED | OUTPATIENT
Start: 2019-08-27 | End: 2020-03-02 | Stop reason: SDUPTHER

## 2019-08-27 RX ORDER — ZAFIRLUKAST 20 MG/1
20 TABLET, FILM COATED ORAL 2 TIMES DAILY
Qty: 60 TABLET | Refills: 5 | Status: SHIPPED | OUTPATIENT
Start: 2019-08-27 | End: 2020-03-02 | Stop reason: SDUPTHER

## 2019-08-27 RX ORDER — MONTELUKAST SODIUM 10 MG/1
TABLET ORAL
Refills: 0 | COMMUNITY
Start: 2019-08-19 | End: 2019-08-27

## 2019-08-27 RX ORDER — AZELASTINE 1 MG/ML
1 SPRAY, METERED NASAL 2 TIMES DAILY PRN
Qty: 1 EACH | Refills: 5 | Status: SHIPPED | OUTPATIENT
Start: 2019-08-27 | End: 2020-03-02 | Stop reason: SDUPTHER

## 2019-08-27 RX ORDER — ALBUTEROL SULFATE 90 UG/1
2 AEROSOL, METERED RESPIRATORY (INHALATION) EVERY 6 HOURS PRN
Qty: 1 INHALER | Refills: 4 | Status: SHIPPED | OUTPATIENT
Start: 2019-08-27 | End: 2019-12-17 | Stop reason: SDUPTHER

## 2019-08-27 RX ORDER — MOMETASONE FUROATE 100 UG/1
1 AEROSOL RESPIRATORY (INHALATION) 2 TIMES DAILY
Qty: 1 INHALER | Refills: 5 | Status: SHIPPED | OUTPATIENT
Start: 2019-08-27 | End: 2020-03-02 | Stop reason: SDUPTHER

## 2019-08-29 ENCOUNTER — APPOINTMENT (OUTPATIENT)
Dept: GENERAL RADIOLOGY | Facility: HOSPITAL | Age: 59
End: 2019-08-29

## 2019-08-29 ENCOUNTER — HOSPITAL ENCOUNTER (EMERGENCY)
Facility: HOSPITAL | Age: 59
Discharge: HOME | End: 2019-08-29
Attending: STUDENT IN AN ORGANIZED HEALTH CARE EDUCATION/TRAINING PROGRAM | Admitting: STUDENT IN AN ORGANIZED HEALTH CARE EDUCATION/TRAINING PROGRAM

## 2019-08-29 VITALS
RESPIRATION RATE: 20 BRPM | TEMPERATURE: 98.2 F | SYSTOLIC BLOOD PRESSURE: 147 MMHG | DIASTOLIC BLOOD PRESSURE: 80 MMHG | HEIGHT: 70 IN | BODY MASS INDEX: 45.1 KG/M2 | OXYGEN SATURATION: 95 % | WEIGHT: 315 LBS | HEART RATE: 81 BPM

## 2019-08-29 DIAGNOSIS — E86.0 DEHYDRATION: ICD-10-CM

## 2019-08-29 DIAGNOSIS — R06.00 DYSPNEA, UNSPECIFIED TYPE: Primary | ICD-10-CM

## 2019-08-29 LAB
A-A DO2: ABNORMAL
ALBUMIN SERPL-MCNC: 4.1 G/DL (ref 3.5–5.2)
ALBUMIN/GLOB SERPL: 1.5 G/DL
ALP SERPL-CCNC: 68 U/L (ref 39–117)
ALT SERPL W P-5'-P-CCNC: 51 U/L (ref 1–41)
ANION GAP SERPL CALCULATED.3IONS-SCNC: 12.6 MMOL/L (ref 5–15)
ARTERIAL PATENCY WRIST A: ABNORMAL
AST SERPL-CCNC: 45 U/L (ref 1–40)
ATMOSPHERIC PRESS: 737 MMHG
BASE EXCESS BLDA CALC-SCNC: 2 MMOL/L (ref 0–2)
BASOPHILS # BLD AUTO: 0.06 10*3/MM3 (ref 0–0.2)
BASOPHILS NFR BLD AUTO: 1.1 % (ref 0–1.5)
BDY SITE: ABNORMAL
BILIRUB SERPL-MCNC: 0.8 MG/DL (ref 0.2–1.2)
BILIRUB UR QL STRIP: NEGATIVE
BUN BLD-MCNC: 8 MG/DL (ref 6–20)
BUN/CREAT SERPL: 12.9 (ref 7–25)
CALCIUM SPEC-SCNC: 8.9 MG/DL (ref 8.6–10.5)
CHLORIDE SERPL-SCNC: 100 MMOL/L (ref 98–107)
CLARITY UR: CLEAR
CO2 SERPL-SCNC: 24.4 MMOL/L (ref 22–29)
COHGB MFR BLD: <0.6 % (ref 0–2)
COLOR UR: YELLOW
CREAT BLD-MCNC: 0.62 MG/DL (ref 0.76–1.27)
D-LACTATE SERPL-SCNC: 1.5 MMOL/L (ref 0.5–2)
D-LACTATE SERPL-SCNC: 2.7 MMOL/L (ref 0.5–2)
DEPRECATED RDW RBC AUTO: 43.8 FL (ref 37–54)
EOSINOPHIL # BLD AUTO: 0.24 10*3/MM3 (ref 0–0.4)
EOSINOPHIL NFR BLD AUTO: 4.4 % (ref 0.3–6.2)
ERYTHROCYTE [DISTWIDTH] IN BLOOD BY AUTOMATED COUNT: 14.6 % (ref 12.3–15.4)
GFR SERPL CREATININE-BSD FRML MDRD: 133 ML/MIN/1.73
GLOBULIN UR ELPH-MCNC: 2.8 GM/DL
GLUCOSE BLD-MCNC: 300 MG/DL (ref 65–99)
GLUCOSE UR STRIP-MCNC: ABNORMAL MG/DL
HCO3 BLDA-SCNC: 26.1 MMOL/L (ref 22–28)
HCT VFR BLD AUTO: 40.8 % (ref 37.5–51)
HCT VFR BLD CALC: 40.6 %
HGB BLD-MCNC: 13.4 G/DL (ref 13–17.7)
HGB BLDA-MCNC: 13.2 G/DL (ref 12–18)
HGB UR QL STRIP.AUTO: NEGATIVE
HOLD SPECIMEN: NORMAL
HOROWITZ INDEX BLD+IHG-RTO: 21 %
IMM GRANULOCYTES # BLD AUTO: 0.14 10*3/MM3 (ref 0–0.05)
IMM GRANULOCYTES NFR BLD AUTO: 2.6 % (ref 0–0.5)
KETONES UR QL STRIP: NEGATIVE
LEUKOCYTE ESTERASE UR QL STRIP.AUTO: NEGATIVE
LYMPHOCYTES # BLD AUTO: 1.1 10*3/MM3 (ref 0.7–3.1)
LYMPHOCYTES NFR BLD AUTO: 20.3 % (ref 19.6–45.3)
MCH RBC QN AUTO: 27.2 PG (ref 26.6–33)
MCHC RBC AUTO-ENTMCNC: 32.8 G/DL (ref 31.5–35.7)
MCV RBC AUTO: 82.9 FL (ref 79–97)
METHGB BLD QL: 0.8 % (ref 0–1.5)
MODALITY: ABNORMAL
MONOCYTES # BLD AUTO: 0.31 10*3/MM3 (ref 0.1–0.9)
MONOCYTES NFR BLD AUTO: 5.7 % (ref 5–12)
NEUTROPHILS # BLD AUTO: 3.57 10*3/MM3 (ref 1.7–7)
NEUTROPHILS NFR BLD AUTO: 65.9 % (ref 42.7–76)
NITRITE UR QL STRIP: NEGATIVE
NOTE: ABNORMAL
NRBC BLD AUTO-RTO: 0 /100 WBC (ref 0–0.2)
NT-PROBNP SERPL-MCNC: 59.6 PG/ML (ref 5–900)
OXYHGB MFR BLDV: 93.4 % (ref 94–99)
PCO2 BLDA: 38 MM HG (ref 35–45)
PCO2 TEMP ADJ BLD: ABNORMAL MM[HG]
PH BLDA: 7.45 PH UNITS (ref 7.3–7.5)
PH UR STRIP.AUTO: 7 [PH] (ref 5–8)
PH, TEMP CORRECTED: ABNORMAL
PLATELET # BLD AUTO: 188 10*3/MM3 (ref 140–450)
PMV BLD AUTO: 10.3 FL (ref 6–12)
PO2 BLDA: 76.4 MM HG (ref 75–100)
PO2 TEMP ADJ BLD: ABNORMAL MM[HG]
POTASSIUM BLD-SCNC: 4.4 MMOL/L (ref 3.5–5.2)
PROCALCITONIN SERPL-MCNC: 0.08 NG/ML (ref 0.1–0.25)
PROT SERPL-MCNC: 6.9 G/DL (ref 6–8.5)
PROT UR QL STRIP: NEGATIVE
RBC # BLD AUTO: 4.92 10*6/MM3 (ref 4.14–5.8)
SAO2 % BLDCOA: 94.4 % (ref 94–100)
SODIUM BLD-SCNC: 137 MMOL/L (ref 136–145)
SP GR UR STRIP: 1.03 (ref 1–1.03)
TROPONIN T SERPL-MCNC: <0.01 NG/ML (ref 0–0.03)
UROBILINOGEN UR QL STRIP: ABNORMAL
VENTILATOR MODE: ABNORMAL
WBC NRBC COR # BLD: 5.42 10*3/MM3 (ref 3.4–10.8)
WHOLE BLOOD HOLD SPECIMEN: NORMAL
WHOLE BLOOD HOLD SPECIMEN: NORMAL

## 2019-08-29 PROCEDURE — 84145 PROCALCITONIN (PCT): CPT | Performed by: NURSE PRACTITIONER

## 2019-08-29 PROCEDURE — 87040 BLOOD CULTURE FOR BACTERIA: CPT | Performed by: STUDENT IN AN ORGANIZED HEALTH CARE EDUCATION/TRAINING PROGRAM

## 2019-08-29 PROCEDURE — 99284 EMERGENCY DEPT VISIT MOD MDM: CPT

## 2019-08-29 PROCEDURE — 81003 URINALYSIS AUTO W/O SCOPE: CPT | Performed by: NURSE PRACTITIONER

## 2019-08-29 PROCEDURE — 82375 ASSAY CARBOXYHB QUANT: CPT

## 2019-08-29 PROCEDURE — 36600 WITHDRAWAL OF ARTERIAL BLOOD: CPT

## 2019-08-29 PROCEDURE — 83050 HGB METHEMOGLOBIN QUAN: CPT

## 2019-08-29 PROCEDURE — 82805 BLOOD GASES W/O2 SATURATION: CPT

## 2019-08-29 PROCEDURE — 80053 COMPREHEN METABOLIC PANEL: CPT | Performed by: STUDENT IN AN ORGANIZED HEALTH CARE EDUCATION/TRAINING PROGRAM

## 2019-08-29 PROCEDURE — 96360 HYDRATION IV INFUSION INIT: CPT

## 2019-08-29 PROCEDURE — 84484 ASSAY OF TROPONIN QUANT: CPT | Performed by: NURSE PRACTITIONER

## 2019-08-29 PROCEDURE — 83605 ASSAY OF LACTIC ACID: CPT | Performed by: STUDENT IN AN ORGANIZED HEALTH CARE EDUCATION/TRAINING PROGRAM

## 2019-08-29 PROCEDURE — 83880 ASSAY OF NATRIURETIC PEPTIDE: CPT | Performed by: NURSE PRACTITIONER

## 2019-08-29 PROCEDURE — 93005 ELECTROCARDIOGRAM TRACING: CPT | Performed by: STUDENT IN AN ORGANIZED HEALTH CARE EDUCATION/TRAINING PROGRAM

## 2019-08-29 PROCEDURE — 71046 X-RAY EXAM CHEST 2 VIEWS: CPT

## 2019-08-29 PROCEDURE — 85025 COMPLETE CBC W/AUTO DIFF WBC: CPT | Performed by: STUDENT IN AN ORGANIZED HEALTH CARE EDUCATION/TRAINING PROGRAM

## 2019-08-29 RX ORDER — SODIUM CHLORIDE 0.9 % (FLUSH) 0.9 %
10 SYRINGE (ML) INJECTION AS NEEDED
Status: DISCONTINUED | OUTPATIENT
Start: 2019-08-29 | End: 2019-08-29 | Stop reason: HOSPADM

## 2019-08-29 RX ADMIN — SODIUM CHLORIDE 2190 ML: 9 INJECTION, SOLUTION INTRAVENOUS at 15:13

## 2019-09-03 LAB
BACTERIA SPEC AEROBE CULT: NORMAL
BACTERIA SPEC AEROBE CULT: NORMAL

## 2019-09-05 ENCOUNTER — TELEPHONE (OUTPATIENT)
Dept: CARDIOLOGY | Facility: CLINIC | Age: 59
End: 2019-09-05

## 2019-09-05 NOTE — TELEPHONE ENCOUNTER
Patient called to report he has been having swelling in his ankle, leg, thigh and stomach, which has been increasing. When he voids, it is dark tea colored and he goes about 3 times daily. He says he has gained 50 lbs in the last 1 1/2 months. /70, HR 70.    He was in the ER last week with the same complaints but he reports they gave him 2L of fluid because he was dehydrated.     He has an appointment with you on Monday. Are there any changes/orders in the meantime?    Currently taking furosemide 40 mg and alternates QOD with 80 mg.   Echo 6/2/2019- EF 61-65%, Trace MR and TR.  Stress test 12/18/2018- No evidence of ischemia.  ZIO results are not back yet.

## 2019-09-06 ENCOUNTER — APPOINTMENT (OUTPATIENT)
Dept: GENERAL RADIOLOGY | Facility: HOSPITAL | Age: 59
End: 2019-09-06

## 2019-09-06 ENCOUNTER — HOSPITAL ENCOUNTER (EMERGENCY)
Facility: HOSPITAL | Age: 59
Discharge: HOME | End: 2019-09-06
Attending: EMERGENCY MEDICINE | Admitting: EMERGENCY MEDICINE

## 2019-09-06 VITALS
SYSTOLIC BLOOD PRESSURE: 124 MMHG | OXYGEN SATURATION: 97 % | DIASTOLIC BLOOD PRESSURE: 67 MMHG | WEIGHT: 315 LBS | RESPIRATION RATE: 22 BRPM | HEART RATE: 84 BPM | TEMPERATURE: 99.7 F | HEIGHT: 70 IN | BODY MASS INDEX: 45.1 KG/M2

## 2019-09-06 DIAGNOSIS — R07.9 CHEST PAIN, UNSPECIFIED TYPE: ICD-10-CM

## 2019-09-06 DIAGNOSIS — R07.81 CHEST PAIN, PLEURITIC: Primary | ICD-10-CM

## 2019-09-06 LAB
ALBUMIN SERPL-MCNC: 4 G/DL (ref 3.5–5.2)
ALBUMIN/GLOB SERPL: 1.3 G/DL
ALP SERPL-CCNC: 79 U/L (ref 39–117)
ALT SERPL W P-5'-P-CCNC: 52 U/L (ref 1–41)
ANION GAP SERPL CALCULATED.3IONS-SCNC: 16 MMOL/L (ref 5–15)
AST SERPL-CCNC: 41 U/L (ref 1–40)
BASOPHILS # BLD AUTO: 0.07 10*3/MM3 (ref 0–0.2)
BASOPHILS NFR BLD AUTO: 1.1 % (ref 0–1.5)
BILIRUB SERPL-MCNC: 0.8 MG/DL (ref 0.2–1.2)
BUN BLD-MCNC: 14 MG/DL (ref 6–20)
BUN/CREAT SERPL: 16.3 (ref 7–25)
CALCIUM SPEC-SCNC: 9.3 MG/DL (ref 8.6–10.5)
CHLORIDE SERPL-SCNC: 97 MMOL/L (ref 98–107)
CO2 SERPL-SCNC: 26 MMOL/L (ref 22–29)
CREAT BLD-MCNC: 0.86 MG/DL (ref 0.76–1.27)
D DIMER PPP FEU-MCNC: 0.46 MCGFEU/ML (ref 0–0.56)
DEPRECATED RDW RBC AUTO: 42.6 FL (ref 37–54)
EOSINOPHIL # BLD AUTO: 0.2 10*3/MM3 (ref 0–0.4)
EOSINOPHIL NFR BLD AUTO: 3.2 % (ref 0.3–6.2)
ERYTHROCYTE [DISTWIDTH] IN BLOOD BY AUTOMATED COUNT: 14.2 % (ref 12.3–15.4)
GFR SERPL CREATININE-BSD FRML MDRD: 91 ML/MIN/1.73
GLOBULIN UR ELPH-MCNC: 3 GM/DL
GLUCOSE BLD-MCNC: 375 MG/DL (ref 65–99)
HCT VFR BLD AUTO: 41.2 % (ref 37.5–51)
HGB BLD-MCNC: 13.3 G/DL (ref 13–17.7)
HOLD SPECIMEN: NORMAL
HOLD SPECIMEN: NORMAL
IMM GRANULOCYTES # BLD AUTO: 0.07 10*3/MM3 (ref 0–0.05)
IMM GRANULOCYTES NFR BLD AUTO: 1.1 % (ref 0–0.5)
LIPASE SERPL-CCNC: 18 U/L (ref 13–60)
LYMPHOCYTES # BLD AUTO: 1.09 10*3/MM3 (ref 0.7–3.1)
LYMPHOCYTES NFR BLD AUTO: 17.5 % (ref 19.6–45.3)
MCH RBC QN AUTO: 26.8 PG (ref 26.6–33)
MCHC RBC AUTO-ENTMCNC: 32.3 G/DL (ref 31.5–35.7)
MCV RBC AUTO: 83.1 FL (ref 79–97)
MONOCYTES # BLD AUTO: 0.38 10*3/MM3 (ref 0.1–0.9)
MONOCYTES NFR BLD AUTO: 6.1 % (ref 5–12)
NEUTROPHILS # BLD AUTO: 4.41 10*3/MM3 (ref 1.7–7)
NEUTROPHILS NFR BLD AUTO: 71 % (ref 42.7–76)
NRBC BLD AUTO-RTO: 0 /100 WBC (ref 0–0.2)
NT-PROBNP SERPL-MCNC: 50.3 PG/ML (ref 5–900)
PLATELET # BLD AUTO: 191 10*3/MM3 (ref 140–450)
PMV BLD AUTO: 10.4 FL (ref 6–12)
POTASSIUM BLD-SCNC: 4.9 MMOL/L (ref 3.5–5.2)
PROT SERPL-MCNC: 7 G/DL (ref 6–8.5)
RBC # BLD AUTO: 4.96 10*6/MM3 (ref 4.14–5.8)
SODIUM BLD-SCNC: 139 MMOL/L (ref 136–145)
TROPONIN T SERPL-MCNC: <0.01 NG/ML (ref 0–0.03)
TROPONIN T SERPL-MCNC: <0.01 NG/ML (ref 0–0.03)
WBC NRBC COR # BLD: 6.22 10*3/MM3 (ref 3.4–10.8)
WHOLE BLOOD HOLD SPECIMEN: NORMAL
WHOLE BLOOD HOLD SPECIMEN: NORMAL

## 2019-09-06 PROCEDURE — 99284 EMERGENCY DEPT VISIT MOD MDM: CPT

## 2019-09-06 PROCEDURE — 83690 ASSAY OF LIPASE: CPT | Performed by: EMERGENCY MEDICINE

## 2019-09-06 PROCEDURE — 71045 X-RAY EXAM CHEST 1 VIEW: CPT

## 2019-09-06 PROCEDURE — 85379 FIBRIN DEGRADATION QUANT: CPT | Performed by: PHYSICIAN ASSISTANT

## 2019-09-06 PROCEDURE — 25010000002 FUROSEMIDE PER 20 MG: Performed by: PHYSICIAN ASSISTANT

## 2019-09-06 PROCEDURE — 85025 COMPLETE CBC W/AUTO DIFF WBC: CPT | Performed by: EMERGENCY MEDICINE

## 2019-09-06 PROCEDURE — 80053 COMPREHEN METABOLIC PANEL: CPT | Performed by: EMERGENCY MEDICINE

## 2019-09-06 PROCEDURE — 96374 THER/PROPH/DIAG INJ IV PUSH: CPT

## 2019-09-06 PROCEDURE — 93005 ELECTROCARDIOGRAM TRACING: CPT | Performed by: EMERGENCY MEDICINE

## 2019-09-06 PROCEDURE — 84484 ASSAY OF TROPONIN QUANT: CPT | Performed by: EMERGENCY MEDICINE

## 2019-09-06 PROCEDURE — 83880 ASSAY OF NATRIURETIC PEPTIDE: CPT | Performed by: EMERGENCY MEDICINE

## 2019-09-06 RX ORDER — METOLAZONE 5 MG/1
5 TABLET ORAL EVERY OTHER DAY
Qty: 3 TABLET | Refills: 0 | Status: ON HOLD | OUTPATIENT
Start: 2019-09-06 | End: 2019-09-14

## 2019-09-06 RX ORDER — SODIUM CHLORIDE 0.9 % (FLUSH) 0.9 %
10 SYRINGE (ML) INJECTION AS NEEDED
Status: DISCONTINUED | OUTPATIENT
Start: 2019-09-06 | End: 2019-09-06 | Stop reason: HOSPADM

## 2019-09-06 RX ORDER — BUMETANIDE 2 MG/1
2 TABLET ORAL 2 TIMES DAILY
Qty: 8 TABLET | Refills: 0 | Status: SHIPPED | OUTPATIENT
Start: 2019-09-06 | End: 2019-09-09 | Stop reason: SDUPTHER

## 2019-09-06 RX ORDER — FUROSEMIDE 10 MG/ML
40 INJECTION INTRAMUSCULAR; INTRAVENOUS ONCE
Status: COMPLETED | OUTPATIENT
Start: 2019-09-06 | End: 2019-09-06

## 2019-09-06 RX ORDER — POTASSIUM CHLORIDE 20 MEQ/1
20 TABLET, EXTENDED RELEASE ORAL 2 TIMES DAILY
Qty: 8 TABLET | Refills: 0
Start: 2019-09-06 | End: 2019-10-08 | Stop reason: SDUPTHER

## 2019-09-06 RX ORDER — ASPIRIN 81 MG/1
324 TABLET, CHEWABLE ORAL ONCE
Status: COMPLETED | OUTPATIENT
Start: 2019-09-06 | End: 2019-09-06

## 2019-09-06 RX ORDER — HYDROCODONE BITARTRATE AND ACETAMINOPHEN 10; 325 MG/1; MG/1
1 TABLET ORAL ONCE
Status: COMPLETED | OUTPATIENT
Start: 2019-09-06 | End: 2019-09-06

## 2019-09-06 RX ADMIN — ASPIRIN 81 MG 324 MG: 81 TABLET ORAL at 15:36

## 2019-09-06 RX ADMIN — FUROSEMIDE 40 MG: 10 INJECTION, SOLUTION INTRAMUSCULAR; INTRAVENOUS at 18:13

## 2019-09-06 RX ADMIN — HYDROCODONE BITARTRATE AND ACETAMINOPHEN 1 TABLET: 10; 325 TABLET ORAL at 18:11

## 2019-09-06 NOTE — TELEPHONE ENCOUNTER
Sounds like he is in right sided HF and needs more aggressive diuresis. Would switch him from lasix to bumex 2mg po bid with metolazone 5mg po every other day for a total of 3 doses. Needs to double his potassium dose. Arrange for him to be seen in [facility] next week. If his symptoms worsen despite changes then he needs to come to [facility].

## 2019-09-06 NOTE — TELEPHONE ENCOUNTER
Spoke with the patient's wife and she verbalized understanding. New scripts sent. Has an appointment on Monday.

## 2019-09-06 NOTE — ED PROVIDER NOTES
Subjective   Denzel Harding is a 59 y.o.male who presents to the emergency department with complaints of chest pain. The patient's left sided chest pain is described as intermittent sharp stabbing pain which began 30 minutes ago today. His pain has no modifying factors. Associated symptoms include increased swelling in his lower extremities, shortness of breath, and numbness in his arms and legs. His shortness of breath worsens when he is lying down flat, and improves when he sits upright. He noticed an increase in his lower extremities yesterday after several visits over the past few months to [facility] where he was given IV fluids. This prompted him to call Dr. Jiménez, cardiologist, who prescribed a diuretic. The patient's medical history includes coronary artery disease, diabetes mellitus, and hypertension, but he denies history of myocardial infarction. He is taking Aspirin. Two years ago, the patient had a coronary artery bypass graft. His cardiologist is Dr. Jiménez. He received a cardiac stress test around 8 months ago which was normal. The patient does not smoke and he seldomly drinks alcohol. There are no other acute complaints at this time.        History provided by:  Patient  Chest Pain   Pain location:  L chest  Pain quality: sharp and stabbing    Pain radiates to:  Does not radiate  Pain severity:  Moderate  Onset quality:  Sudden  Duration:  30 minutes  Timing:  Intermittent  Progression:  Waxing and waning  Chronicity:  New  Ineffective treatments:  None tried  Associated symptoms: lower extremity edema, numbness and shortness of breath    Risk factors: coronary artery disease, diabetes mellitus and hypertension        Review of Systems   Respiratory: Positive for shortness of breath.    Cardiovascular: Positive for chest pain and leg swelling.   Neurological: Positive for numbness.   All other systems reviewed and are negative.      Past Medical History:   Diagnosis Date   •  Anxiety    • Arthritis    • Asthma    • BiPAP (biphasic positive airway pressure) dependence    • Brachial neuritis 1/19/2017   • Chest pain    • CHF (congestive heart failure) (CMS/[facility])    • COPD (chronic obstructive pulmonary disease) (CMS/[facility])    • Coronary artery disease    • Depression    • Diabetes mellitus (CMS/[facility])    • Dizziness    • Edema    • GERD (gastroesophageal reflux disease)    • H/O chest x-ray 07/04/2015    Mild Left base atelectasis   • H/O echocardiogram 07/05/2015    Normal LVSF. EF of 60-65%. Grade 1 diastolic dysfunction of the LV myocardium. No evidence of pericardial effusion   • H/O exercise stress test    • History of herniated intervertebral disc     History of left L5-S1 disc herniation   • Hypertension    • LOM (loss of memory) 1/19/2017   • Lower back pain     Right   • Measles    • Obstructive sleep apnea    • Palpitations    • Pericardial effusion    • Seizure disorder (CMS/[facility])    • Seizures (CMS/[facility])     FOCAL    • Shortness of breath 1/19/2017   • SOB (shortness of breath)    • Stroke (CMS/[facility])    • Wears glasses        Allergies   Allergen Reactions   • Sulfa Antibiotics Anaphylaxis, Nausea And Vomiting and Delirium   • Codeine Nausea And Vomiting     Can only take with Phenergan   • Invokana [Canagliflozin]      DKA   • Morphine      Does not work. It causes pain       Past Surgical History:   Procedure Laterality Date   • BACK SURGERY     • CARDIAC CATHETERIZATION     • CARDIAC CATHETERIZATION N/A 8/11/2017    Procedure: Coronary angiography;  Surgeon: Dallas Kim MD;  Location: [facility] CATH INVASIVE LOCATION;  Service:    • CARDIAC CATHETERIZATION N/A 8/11/2017    Procedure: Left Heart Cath;  Surgeon: Dallas Kim MD;  Location: [facility] CATH INVASIVE LOCATION;  Service:    • CARDIAC CATHETERIZATION N/A 8/11/2017    Procedure: Left ventriculography;  Surgeon: Dallas Kim MD;  Location: [facility] CATH INVASIVE LOCATION;  Service:    • CARDIOVASCULAR STRESS TEST   07/03/2017    WITH DR HERNANDEZ AT [facility]   • CARPAL TUNNEL RELEASE Right    • CATARACT EXTRACTION W/ INTRAOCULAR LENS IMPLANT Right 6/12/2017    Procedure: CATARACT PHACO EXTRACTION WITH INTRAOCULAR LENS IMPLANT RIGHT ;  Surgeon: Natalie Cao MD;  Location: [facility] OR;  Service:    • CATARACT EXTRACTION W/ INTRAOCULAR LENS IMPLANT Left 7/10/2017    Procedure: CATARACT PHACO EXTRACTION WITH INTRAOCULAR LENS IMPLANT LEFT;  Surgeon: Natalie Cao MD;  Location: [facility] OR;  Service:    • CHOLECYSTECTOMY     • CHOLECYSTECTOMY     • COLONOSCOPY     • CORONARY ANGIOPLASTY WITH STENT PLACEMENT     • CORONARY ANGIOPLASTY WITH STENT PLACEMENT      X1-LAD   • CORONARY ARTERY BYPASS GRAFT N/A 8/15/2017    Procedure: CORONARY ARTERY BYPASS GRAFTING x 3 UTILIZING THE LEFT INTERNAL MAMMARY ARTERY WITH ENDOSCOPIC VEIN HARVESTING OF THE RIGHT GREATER SAPHENOUS VEIN, HAMLET, LAD ENDARECTOMY;  Surgeon: London Damon MD;  Location: [facility] OR;  Service:    • ENDOSCOPY     • EYE SURGERY      cataract surgery both eyes   • KNEE ARTHROSCOPY Bilateral    • NEUROPLASTY / TRANSPOSITION ULNAR NERVE AT ELBOW     • OTHER SURGICAL HISTORY      Foraminotomy and discectomy   • PERICARDIAL WINDOW N/A 8/25/2017    Procedure: PERICARDIAL WINDOW;  Surgeon: Freeman Phillips MD;  Location: [facility] OR;  Service:        Family History   Problem Relation Age of Onset   • Hypertension Mother    • Arthritis Mother    • Alcohol abuse Father    • Heart disease Other    • Stroke Other    • Hypertension Other    • Other Other         Neurologic disorder   • Parkinsonism Other    • Stroke Other    • Heart disease Other    • Hypertension Other    • Heart disease Other    • Stroke Other    • Hypertension Other        Social History     Socioeconomic History   • Marital status:      Spouse name: Not on file   • Number of children: 1   • Years of education: Not on file   • Highest education level: Not on file   Occupational History   • Occupation: [employer] and  [employer]     Comment: Disabled since -Back Problems   Tobacco Use   • Smoking status: Former Smoker     Packs/day: 1.00     Years: 10.00     Pack years: 10.00     Types: Cigarettes     Last attempt to quit: 1998     Years since quittin.7   • Smokeless tobacco: Former User     Types: Snuff     Quit date:    Substance and Sexual Activity   • Alcohol use: Yes     Comment: 3 PER YEAR   • Drug use: No   • Sexual activity: Defer   Social History Narrative    Caffeine use: 0 serving daily.    Patient lives at home with wife.          Objective   Physical Exam   Constitutional: He is oriented to person, place, and time. He appears well-developed and well-nourished.   Somewhat unkept and centrally obese   HENT:   Head: Normocephalic and atraumatic.   He is missing front incisors and upper oropharynx membranes are dry   Eyes: Conjunctivae are normal. No scleral icterus.   Neck: Normal range of motion. Neck supple.   Cardiovascular: Normal rate, regular rhythm and normal heart sounds.   Pulmonary/Chest: Effort normal. He has decreased breath sounds (Bilaterally). He has no wheezes. He has no rhonchi. He has rales (Faint rales in lower lung fields) in the right lower field and the left lower field.   Abdominal: Soft. There is no tenderness.   Musculoskeletal: Normal range of motion. He exhibits edema.   3+ pitting edema in bilaterally lower extremities.No erythema, no drainage, no venous stasis changes.   Neurological: He is alert and oriented to person, place, and time.   Skin: Skin is warm and dry.   Psychiatric: He has a normal mood and affect. His behavior is normal.   Nursing note and vitals reviewed.      Procedures         ED Course      Recent Results (from the past 24 hour(s))   Magnesium    Collection Time: 19 11:54 AM   Result Value Ref Range    Magnesium 1.8 1.6 - 2.6 mg/dL   Basic Metabolic Panel    Collection Time: 19 11:54 AM   Result Value Ref Range    Glucose 297 (H) 65 - 99  mg/dL    BUN 23 (H) 6 - 20 mg/dL    Creatinine 1.58 (H) 0.76 - 1.27 mg/dL    Sodium 136 136 - 145 mmol/L    Potassium 3.8 3.5 - 5.2 mmol/L    Chloride 89 (L) 98 - 107 mmol/L    CO2 31.6 (H) 22.0 - 29.0 mmol/L    Calcium 9.6 8.6 - 10.5 mg/dL    eGFR Non African Amer 45 (L) >60 mL/min/1.73    BUN/Creatinine Ratio 14.6 7.0 - 25.0    Anion Gap 15.4 (H) 5.0 - 15.0 mmol/L     Note: In addition to lab results from this visit, the labs listed above may include labs taken at another facility or during a different encounter within the last 24 hours. Please correlate lab times with ED admission and discharge times for further clarification of the services performed during this visit.    XR Chest 1 View   Final Result   Status post median sternotomy with cardiac silhouette   unchanged and no evidence for overt edema or effusion.           D:  09/06/2019   E:  09/06/2019       This report was finalized on 9/6/2019 5:48 PM by Dr. Mendel Rosenberg.            Vitals:    09/06/19 1630 09/06/19 1700 09/06/19 1730 09/06/19 1813   BP: 131/68 127/70 133/69 124/67   BP Location:       Patient Position:       Pulse: 82 82 86 84   Resp:       Temp:       TempSrc:       SpO2: 94% 95% 97%    Weight:       Height:         Medications   aspirin chewable tablet 324 mg (324 mg Oral Given 9/6/19 1536)   furosemide (LASIX) injection 40 mg (40 mg Intravenous Given 9/6/19 1813)   HYDROcodone-acetaminophen (NORCO)  MG per tablet 1 tablet (1 tablet Oral Given 9/6/19 1811)     ECG/EMG Results (last 24 hours)     Procedure Component Value Units Date/Time    ECG 12 Lead [[id number]] Collected:  09/06/19 1438     Updated:  09/06/19 1450        ECG 12 Lead   Final Result   Test Reason : 2nd set   Blood Pressure : **/** mmHG   Vent. Rate : 084 BPM     Atrial Rate : 084 BPM      P-R Int : 148 ms          QRS Dur : 098 ms       QT Int : 408 ms       P-R-T Axes : 036 060 040 degrees      QTc Int : 482 ms      Normal sinus rhythm   Prolonged QT   Abnormal ECG    When compared with ECG of 06-SEP-2019 14:38, (Unconfirmed)   No significant change was found   Confirmed by JONNY GOLDSMITH MD ([id number]) on 9/6/2019 9:35:20 PM      Referred By:  edmd           Confirmed By:JONNY GOLDSMITH MD      ECG 12 Lead   Final Result   Test Reason : cp   Blood Pressure : **/** mmHG   Vent. Rate : 080 BPM     Atrial Rate : 080 BPM      P-R Int : 152 ms          QRS Dur : 100 ms       QT Int : 414 ms       P-R-T Axes : 020 067 076 degrees      QTc Int : 477 ms      Normal sinus rhythm   Prolonged QT   Abnormal ECG   When compared with ECG of 09-JUN-2018 12:37,   No significant change was found   Confirmed by JONNY GOLDSMITH MD ([id number]) on 9/6/2019 9:35:04 PM      Referred By:  edmd           Confirmed By:JONNY GOLDSMITH MD                            MDM  Number of Diagnoses or Management Options  Chest pain, pleuritic: new and requires workup  Chest pain, unspecified type: new and requires workup     Amount and/or Complexity of Data Reviewed  Clinical lab tests: ordered and reviewed  Tests in the radiology section of CPT®: reviewed and ordered  Tests in the medicine section of CPT®: reviewed and ordered  Decide to obtain previous medical records or to obtain history from someone other than the patient: yes  Discuss the patient with other providers: yes    Patient Progress  Patient progress: stable      Final diagnoses:   Chest pain, pleuritic   Chest pain, unspecified type       Documentation assistance provided by wen Miller.  Information recorded by the scribe was done at my direction and has been verified and validated by me.     Elenita Miller  09/06/19 1513       London Alaniz PA  09/09/19 4410

## 2019-09-09 ENCOUNTER — OFFICE VISIT (OUTPATIENT)
Dept: CARDIOLOGY | Facility: CLINIC | Age: 59
End: 2019-09-09

## 2019-09-09 ENCOUNTER — LAB (OUTPATIENT)
Dept: LAB | Facility: HOSPITAL | Age: 59
End: 2019-09-09

## 2019-09-09 VITALS
HEART RATE: 121 BPM | WEIGHT: 315 LBS | SYSTOLIC BLOOD PRESSURE: 128 MMHG | DIASTOLIC BLOOD PRESSURE: 68 MMHG | HEIGHT: 70 IN | BODY MASS INDEX: 45.1 KG/M2 | OXYGEN SATURATION: 91 %

## 2019-09-09 DIAGNOSIS — I50.31 ACUTE DIASTOLIC CHF (CONGESTIVE HEART FAILURE) (HCC): ICD-10-CM

## 2019-09-09 DIAGNOSIS — I10 ESSENTIAL HYPERTENSION: ICD-10-CM

## 2019-09-09 DIAGNOSIS — I25.10 CORONARY ARTERIOSCLEROSIS IN NATIVE ARTERY: Primary | ICD-10-CM

## 2019-09-09 LAB
ANION GAP SERPL CALCULATED.3IONS-SCNC: 15.4 MMOL/L (ref 5–15)
BUN BLD-MCNC: 23 MG/DL (ref 6–20)
BUN/CREAT SERPL: 14.6 (ref 7–25)
CALCIUM SPEC-SCNC: 9.6 MG/DL (ref 8.6–10.5)
CHLORIDE SERPL-SCNC: 89 MMOL/L (ref 98–107)
CO2 SERPL-SCNC: 31.6 MMOL/L (ref 22–29)
CREAT BLD-MCNC: 1.58 MG/DL (ref 0.76–1.27)
GFR SERPL CREATININE-BSD FRML MDRD: 45 ML/MIN/1.73
GLUCOSE BLD-MCNC: 297 MG/DL (ref 65–99)
MAGNESIUM SERPL-MCNC: 1.8 MG/DL (ref 1.6–2.6)
POTASSIUM BLD-SCNC: 3.8 MMOL/L (ref 3.5–5.2)
SODIUM BLD-SCNC: 136 MMOL/L (ref 136–145)

## 2019-09-09 PROCEDURE — 80048 BASIC METABOLIC PNL TOTAL CA: CPT

## 2019-09-09 PROCEDURE — 99214 OFFICE O/P EST MOD 30 MIN: CPT | Performed by: INTERNAL MEDICINE

## 2019-09-09 PROCEDURE — 83735 ASSAY OF MAGNESIUM: CPT | Performed by: INTERNAL MEDICINE

## 2019-09-09 PROCEDURE — 93000 ELECTROCARDIOGRAM COMPLETE: CPT | Performed by: INTERNAL MEDICINE

## 2019-09-09 PROCEDURE — 36415 COLL VENOUS BLD VENIPUNCTURE: CPT

## 2019-09-09 RX ORDER — BUMETANIDE 2 MG/1
2 TABLET ORAL 2 TIMES DAILY
Qty: 60 TABLET | Refills: 5 | Status: ON HOLD | OUTPATIENT
Start: 2019-09-09 | End: 2019-09-16 | Stop reason: SDUPTHER

## 2019-09-09 NOTE — PROGRESS NOTES
[facility] at [facility]  Office visit  Denzel Harding  1960  [phone]    VISIT DATE:  9/9/2019      PCP: Ottoniel Toledo MD  [address]  [facility] [address]    CC:  Chief Complaint   Patient presents with   • Coronary Artery Disease       PROBLEM LIST:  1. Post op pericarditis with effusion s/p left anterior thoracotomy and pericardial window 8/25/17  2. CABG x 3, LAD endarterectomy 8/15/17 Dr. Damon, Normal EF, DHF  3. HTN: Controlled, BB and Norvasc  4. CVA: Remote  5. DM  6. COPD/EDD  7. HLD    Previous cardiac studies and procedures:  August 2017 cardiac catheterization  · Severe three-vessel coronary artery disease  · Preserved global and regional left ventricular systolic function  · Elevated left ventricular filling pressures consistent with diastolic heart failure.  · No significant left-sided valvular abnormalities appreciated    December 2017  Echo  · Left ventricular systolic function is normal.  · Left ventricular diastolic dysfunction (grade I a) consistent with impaired relaxation.  · Left ventricular wall thickness is consistent with mild concentric hypertrophy.  Carotid duplex  · Proximal right internal carotid artery plaque without significant stenosis.  · Right internal carotid artery stenosis of 0-49%.  · Proximal left internal carotid artery plaque without significant stenosis.  · Left internal carotid artery stenosis of 0-49%.    December 2018 myocardial perfusion imaging  · Left ventricular ejection fraction is borderline normal (Calculated EF = 50%).  · Myocardial perfusion imaging indicates a normal myocardial perfusion study with no evidence of ischemia.    6/2019 echo  Technically very limited study   1) Mild LVH with normal LV systolic function ( EF is over 55%)  2) Mild left atrial enlargement   3) Trace MR and TR   4) Normal RV function       ASSESSMENT:   Diagnosis Plan   1. Coronary arteriosclerosis in native artery     2. Acute diastolic CHF (congestive  "heart failure) (CMS/[facility])     3. Essential hypertension         PLAN:  Coronary artery disease: Normal EF Stable, status post surgical revascularization.  Currently with atypical angina, myocardial perfusion imaging pending.  Continue aspirin, high intensity statin therapy and afterload reduction    Postop pericarditis status post pericardial window    Hypertension: goal less than 130/80 and now with persistent intermittent symptomatic hypotension, decreasing amlodipine to 2.5 mg by mouth daily.  Hyperlipidemia: Continue high intensity statin therapy, goal LDL less than 100    Congestive heart failure, acute on chronic, diastolic: Suspect recent exacerbation with associated acute on chronic cor pulmonale.  Responding well to Bumex.  BMP pending today.  Continue BiPAP.  Continue to limit sodium intake and trend daily weights.    Hyperlipidemia: Goal LDL less than 70.  Continue atorvastatin 80 mg by mouth daily and predominant plant-based diet.    Subjective  And gained approximately 30 pounds over the past several weeks with associated abdominal bloating and lower extremity edema.  Underwent recent evaluation at [facility], preserved LV systolic function, otherwise limited.  RV size and function was difficult to appreciate due to body habitus.  He had received 2 L IV fluid resuscitation during 1 ED evaluation due to suspected dehydration, which resulted in worsening swelling and dyspnea.  Over the past 3 days we have transitioned from Lasix over to Bumex and he has had an adequate response with approximately 15 pound weight loss.  His abdominal distention and lower extremity edema as well as dyspnea have improved.  No significant arrhythmias noted on recent 2-week ambulatory ECG monitor.    PHYSICAL EXAMINATION:  Vitals:    09/09/19 1100   BP: 128/68   BP Location: Left arm   Patient Position: Sitting   Pulse: (!) 121   SpO2: 91%   Weight: (!) 147 kg (323 lb)   Height: 177.8 cm (70\")     General " Appearance:    Alert, cooperative, no distress, appears stated age   Head:    Normocephalic, without obvious abnormality, atraumatic   Eyes:    conjunctiva/corneas clear   Nose:   Nares normal, septum midline, mucosa normal, no drainage   Throat:   Lips, teeth and gums normal   Neck:   Supple, symmetrical, trachea midline, no carotid    bruit or JVD   Lungs:     Clear to auscultation bilaterally, respirations unlabored   Chest Wall:    No tenderness or deformity    Heart:    Regular rate and rhythm, S1 and S2 normal, no murmur, rub   or gallop, normal carotid impulse bilaterally without bruit.   Abdomen:     Soft, non-tender   Extremities:   Extremities normal, atraumatic, no cyanosis, 2-3+ bilateral pretibial edema   Pulses:   2+ and symmetric all extremities   Skin:   Skin color, texture, turgor normal, no rashes or lesions       Diagnostic Data:    ECG 12 Lead  Date/Time: 9/9/2019 11:14 AM  Performed by: London Jiménez III, MD  Authorized by: London Jiménez III, MD   Comparison: compared with previous ECG from 9/6/2019  Comparison to previous ECG: Resting heart rate is increased  Rhythm: sinus tachycardia  Conduction: left posterior fascicular block    Clinical impression: abnormal EKG          Lab Results   Component Value Date    TRIG 77 06/01/2019    HDL 62 (H) 06/01/2019     Lab Results   Component Value Date    GLUCOSE 375 (H) 09/06/2019    BUN 14 09/06/2019    CREATININE 0.86 09/06/2019     09/06/2019    K 4.9 09/06/2019    CL 97 (L) 09/06/2019    CO2 26.0 09/06/2019     Lab Results   Component Value Date    HGBA1C 10.7 (H) 05/22/2019     Lab Results   Component Value Date    WBC 6.22 09/06/2019    HGB 13.3 09/06/2019    HCT 41.2 09/06/2019     09/06/2019       Allergies  Allergies   Allergen Reactions   • Sulfa Antibiotics Anaphylaxis, Nausea And Vomiting and Delirium   • Codeine Nausea And Vomiting     Can only take with Phenergan   • Invokana [Canagliflozin]      DKA   • Morphine      Does not  work. It causes pain       Current Medications    Current Outpatient Medications:   •  albuterol sulfate  (90 Base) MCG/ACT inhaler, Inhale 2 puffs Every 6 (Six) Hours As Needed for Wheezing or Shortness of Air., Disp: 1 inhaler, Rfl: 4  •  amitriptyline (ELAVIL) 25 MG tablet, TAKE 3 TABLETS BY MOUTH EVERY NIGHT, Disp: 90 tablet, Rfl: 11  •  amLODIPine (NORVASC) 5 MG tablet, TAKE 1/2 TABLET BY MOUTH DAILY, Disp: 45 tablet, Rfl: 3  •  ASMANEX  MCG/ACT aerosol, Inhale 1 puff 2 (Two) Times a Day., Disp: 1 inhaler, Rfl: 5  •  aspirin 325 MG tablet, Take 1 tablet by mouth Daily., Disp: , Rfl:   •  atorvastatin (LIPITOR) 80 MG tablet, Take 1 tablet by mouth Every Night., Disp: 30 tablet, Rfl: 0  •  azelastine (ASTELIN) 0.1 % nasal spray, 1 spray into the nostril(s) as directed by provider 2 (Two) Times a Day As Needed for Rhinitis or Allergies. Use in each nostril as directed, Disp: 1 each, Rfl: 5  •  budesonide-formoterol (SYMBICORT) 80-4.5 MCG/ACT inhaler, Inhale 2 puffs 2 (Two) Times a Day. Rinse mouth with water after use., Disp: 1 inhaler, Rfl: 5  •  bumetanide (BUMEX) 2 MG tablet, Take 1 tablet by mouth 2 (Two) Times a Day., Disp: 8 tablet, Rfl: 0  •  Cetirizine HCl 10 MG capsule, Take 1 capsule by mouth Every Night., Disp: , Rfl:   •  clotrimazole-betamethasone (LOTRISONE) 1-0.05 % cream, APPLY TO THE AFFECTED AREA TWICE DAILY FOR 2 WEEKS, Disp: , Rfl: 0  •  coenzyme Q10 100 MG capsule, Take 100 mg by mouth Daily., Disp: , Rfl:   •  cyclobenzaprine (FLEXERIL) 10 MG tablet, Take 1 tablet by mouth 3 (Three) Times a Day As Needed for Muscle Spasms., Disp: 15 tablet, Rfl: 0  •  CYCLOSET 0.8 MG tablet, Take 3.2 mg by mouth Every Morning., Disp: , Rfl: 0  •  flunisolide (NASALIDE) 25 MCG/ACT (0.025%) solution nasal spray, Inhale 2 sprays Every 12 (Twelve) Hours., Disp: 1 bottle, Rfl: 1  •  gabapentin (NEURONTIN) 600 MG tablet, Take 600 mg by mouth 2 (Two) Times a Day., Disp: , Rfl: 0  •  glucose blood test  strip, Use as instructed, Disp: 100 each, Rfl: 12  •  HUMALOG KWIKPEN 100 UNIT/ML solution pen-injector, Inject 10 Units under the skin 3 (Three) Times a Day With Meals. Follows SSI From PCP (Patient taking differently: Inject 50 Units under the skin into the appropriate area as directed 3 (Three) Times a Day With Meals. Follows SSI From PCP), Disp: , Rfl: 0  •  HYDROcodone-acetaminophen (NORCO) 5-325 MG per tablet, Take 1 tablet by mouth Every 6 (Six) Hours As Needed for Severe Pain ., Disp: 12 tablet, Rfl: 0  •  ipratropium-albuterol (DUO-NEB) 0.5-2.5 mg/3 ml nebulizer, Take 3 mL by nebulization 4 (Four) Times a Day As Needed for Wheezing or Shortness of Air., Disp: 360 mL, Rfl: 5  •  JANUVIA 100 MG tablet, Take 100 mg by mouth Daily., Disp: , Rfl: 0  •  levocetirizine (XYZAL) 5 MG tablet, Take 5 mg by mouth Every Evening., Disp: , Rfl: 0  •  lisinopril (PRINIVIL,ZESTRIL) 20 MG tablet, Take 20 mg by mouth Daily., Disp: , Rfl:   •  meclizine (ANTIVERT) 25 MG tablet, Take 1 tablet by mouth 3 (Three) Times a Day As Needed for dizziness., Disp: 10 tablet, Rfl: 0  •  metOLazone (ZAROXOLYN) 5 MG tablet, Take 1 tablet by mouth Every Other Day., Disp: 3 tablet, Rfl: 0  •  metoprolol tartrate (LOPRESSOR) 50 MG tablet, take 1 tablet by mouth twice a day, Disp: 180 tablet, Rfl: 3  •  mupirocin (BACTROBAN) 2 % ointment, apply to affected area twice a day, Disp: , Rfl: 0  •  omeprazole (PriLOSEC) 20 MG capsule, Take 1 capsule by mouth Every Night., Disp: , Rfl:   •  ondansetron ODT (ZOFRAN-ODT) 4 MG disintegrating tablet, Take 1 tablet by mouth Every 6 (Six) Hours As Needed for Nausea., Disp: 20 tablet, Rfl: 0  •  potassium chloride (K-DUR,KLOR-CON) 20 MEQ CR tablet, Take 1 tablet by mouth 2 (Two) Times a Day., Disp: 8 tablet, Rfl: 0  •  ranolazine (RANEXA) 500 MG 12 hr tablet, Take 1 tablet by mouth 2 (Two) Times a Day., Disp: 60 tablet, Rfl: 1  •  Tiotropium Bromide Monohydrate (SPIRIVA RESPIMAT) 1.25 MCG/ACT aerosol  solution inhaler, Inhale 2 puffs Daily., Disp: 1 inhaler, Rfl: 5  •  TOUJEO SOLOSTAR 300 UNIT/ML solution pen-injector, Inject 60 Units as directed Daily. (Patient taking differently: Inject 160 Units as directed Every Night.), Disp: , Rfl: 0  •  TURMERIC PO, Take  by mouth Daily., Disp: , Rfl:   •  VICTOZA 18 MG/3ML solution pen-injector injection, Inject 1.2 mg under the skin Daily., Disp: , Rfl: 0  •  vitamin C (ASCORBIC ACID) 500 MG tablet, Take 500 mg by mouth Daily., Disp: , Rfl:   •  Vortioxetine HBr (TRINTELLIX) 20 MG tablet, Take 20 mg by mouth Daily., Disp: , Rfl:   •  zafirlukast (ACCOLATE) 20 MG tablet, Take 1 tablet by mouth 2 (Two) Times a Day., Disp: 60 tablet, Rfl: 5    Current Facility-Administered Medications:   •  Dupilumab solution prefilled syringe 300 mg, 300 mg, Subcutaneous, Q14 Days, Matilde Bains APRN ROS  Review of Systems   Cardiovascular: Positive for chest pain, dyspnea on exertion, irregular heartbeat, leg swelling and palpitations.   Respiratory: Positive for cough, shortness of breath, snoring, sputum production and wheezing.    Neurological: Positive for dizziness.       SOCIAL HX  Social History     Socioeconomic History   • Marital status:      Spouse name: Not on file   • Number of children: 1   • Years of education: Not on file   • Highest education level: Not on file   Occupational History   • Occupation: [employer]     Comment: Disabled since -Back Problems   Tobacco Use   • Smoking status: Former Smoker     Packs/day: 1.00     Years: 10.00     Pack years: 10.00     Types: Cigarettes     Last attempt to quit: 1998     Years since quittin.7   • Smokeless tobacco: Former User     Types: Snuff     Quit date:    Substance and Sexual Activity   • Alcohol use: Yes     Comment: 3 PER YEAR   • Drug use: No   • Sexual activity: Defer   Social History Narrative    Caffeine use: 0 serving daily.    Patient lives at home  with wife.        FAMILY HX  Family History   Problem Relation Age of Onset   • Hypertension Mother    • Arthritis Mother    • Alcohol abuse Father    • Heart disease Other    • Stroke Other    • Hypertension Other    • Other Other         Neurologic disorder   • Parkinsonism Other    • Stroke Other    • Heart disease Other    • Hypertension Other    • Heart disease Other    • Stroke Other    • Hypertension Other              London Jiménez III, MD, FACC

## 2019-09-10 ENCOUNTER — TELEPHONE (OUTPATIENT)
Dept: CARDIOLOGY | Facility: CLINIC | Age: 59
End: 2019-09-10

## 2019-09-10 DIAGNOSIS — I10 ESSENTIAL HYPERTENSION: Primary | ICD-10-CM

## 2019-09-10 NOTE — TELEPHONE ENCOUNTER
LVM for pt to return call to schedule a f/u with [facility] and orders placed for a repeat BMP at that time as well. Pt advised via VM to have blood work completed. Will await return call.

## 2019-09-10 NOTE — TELEPHONE ENCOUNTER
----- Message from London Jiménez III, MD sent at 9/10/2019  8:18 AM EDT -----  Needs to f/u with either me or the [facility] with repeat BMP in 1-2 weeks

## 2019-09-12 RX ORDER — RANOLAZINE 500 MG/1
TABLET, EXTENDED RELEASE ORAL
Qty: 180 TABLET | Refills: 3 | Status: SHIPPED | OUTPATIENT
Start: 2019-09-12 | End: 2020-04-27

## 2019-09-14 ENCOUNTER — APPOINTMENT (OUTPATIENT)
Dept: CT IMAGING | Facility: HOSPITAL | Age: 59
End: 2019-09-14

## 2019-09-14 ENCOUNTER — APPOINTMENT (OUTPATIENT)
Dept: GENERAL RADIOLOGY | Facility: HOSPITAL | Age: 59
End: 2019-09-14

## 2019-09-14 ENCOUNTER — HOSPITAL ENCOUNTER (OUTPATIENT)
Facility: HOSPITAL | Age: 59
Setting detail: OBSERVATION
LOS: 1 days | Discharge: HOME | End: 2019-09-16
Attending: EMERGENCY MEDICINE | Admitting: EMERGENCY MEDICINE

## 2019-09-14 DIAGNOSIS — R07.9 CHEST PAIN, UNSPECIFIED TYPE: Primary | ICD-10-CM

## 2019-09-14 DIAGNOSIS — E11.65 TYPE 2 DIABETES MELLITUS WITH HYPERGLYCEMIA, UNSPECIFIED WHETHER LONG TERM INSULIN USE (HCC): ICD-10-CM

## 2019-09-14 DIAGNOSIS — I10 ESSENTIAL HYPERTENSION: ICD-10-CM

## 2019-09-14 DIAGNOSIS — Z86.79 HISTORY OF CORONARY ARTERY DISEASE: ICD-10-CM

## 2019-09-14 DIAGNOSIS — R77.8 ELEVATED TROPONIN: ICD-10-CM

## 2019-09-14 DIAGNOSIS — E11.42 DIABETIC POLYNEUROPATHY ASSOCIATED WITH TYPE 2 DIABETES MELLITUS (HCC): ICD-10-CM

## 2019-09-14 DIAGNOSIS — N17.9 AKI (ACUTE KIDNEY INJURY) (HCC): ICD-10-CM

## 2019-09-14 DIAGNOSIS — Z86.79 HISTORY OF CHF (CONGESTIVE HEART FAILURE): ICD-10-CM

## 2019-09-14 PROBLEM — I50.32 CHRONIC DIASTOLIC CHF (CONGESTIVE HEART FAILURE) (HCC): Status: ACTIVE | Noted: 2019-09-14

## 2019-09-14 PROBLEM — J01.90 ACUTE SINUSITIS: Status: ACTIVE | Noted: 2019-09-14

## 2019-09-14 PROBLEM — J44.9 COPD (CHRONIC OBSTRUCTIVE PULMONARY DISEASE): Status: ACTIVE | Noted: 2019-09-14

## 2019-09-14 PROBLEM — G93.41 ACUTE METABOLIC ENCEPHALOPATHY: Status: ACTIVE | Noted: 2019-09-14

## 2019-09-14 LAB
ALBUMIN SERPL-MCNC: 4.3 G/DL (ref 3.5–5.2)
ALBUMIN/GLOB SERPL: 1.7 G/DL
ALP SERPL-CCNC: 71 U/L (ref 39–117)
ALT SERPL W P-5'-P-CCNC: 55 U/L (ref 1–41)
AMPHET+METHAMPHET UR QL: NEGATIVE
AMPHETAMINES UR QL: NEGATIVE
ANION GAP SERPL CALCULATED.3IONS-SCNC: 12 MMOL/L (ref 5–15)
AST SERPL-CCNC: 57 U/L (ref 1–40)
BACTERIA UR QL AUTO: ABNORMAL /HPF
BARBITURATES UR QL SCN: NEGATIVE
BASOPHILS # BLD AUTO: 0.05 10*3/MM3 (ref 0–0.2)
BASOPHILS NFR BLD AUTO: 0.6 % (ref 0–1.5)
BENZODIAZ UR QL SCN: NEGATIVE
BILIRUB SERPL-MCNC: 0.7 MG/DL (ref 0.2–1.2)
BILIRUB UR QL STRIP: ABNORMAL
BUN BLD-MCNC: 26 MG/DL (ref 6–20)
BUN/CREAT SERPL: 17.8 (ref 7–25)
BUPRENORPHINE SERPL-MCNC: NEGATIVE NG/ML
CALCIUM SPEC-SCNC: 9.6 MG/DL (ref 8.6–10.5)
CANNABINOIDS SERPL QL: NEGATIVE
CHLORIDE SERPL-SCNC: 93 MMOL/L (ref 98–107)
CK SERPL-CCNC: 127 U/L (ref 20–200)
CLARITY UR: CLEAR
CO2 SERPL-SCNC: 34 MMOL/L (ref 22–29)
COCAINE UR QL: NEGATIVE
COLOR UR: ABNORMAL
CREAT BLD-MCNC: 1.46 MG/DL (ref 0.76–1.27)
D-LACTATE SERPL-SCNC: 2 MMOL/L (ref 0.5–2)
D-LACTATE SERPL-SCNC: 2.6 MMOL/L (ref 0.5–2)
DEPRECATED RDW RBC AUTO: 40.3 FL (ref 37–54)
EOSINOPHIL # BLD AUTO: 0.26 10*3/MM3 (ref 0–0.4)
EOSINOPHIL NFR BLD AUTO: 3.3 % (ref 0.3–6.2)
ERYTHROCYTE [DISTWIDTH] IN BLOOD BY AUTOMATED COUNT: 13.5 % (ref 12.3–15.4)
GFR SERPL CREATININE-BSD FRML MDRD: 50 ML/MIN/1.73
GLOBULIN UR ELPH-MCNC: 2.6 GM/DL
GLUCOSE BLD-MCNC: 227 MG/DL (ref 65–99)
GLUCOSE UR STRIP-MCNC: NEGATIVE MG/DL
HCT VFR BLD AUTO: 41.3 % (ref 37.5–51)
HGB BLD-MCNC: 13.5 G/DL (ref 13–17.7)
HGB UR QL STRIP.AUTO: NEGATIVE
HOLD SPECIMEN: NORMAL
HYALINE CASTS UR QL AUTO: ABNORMAL /LPF
IMM GRANULOCYTES # BLD AUTO: 0.05 10*3/MM3 (ref 0–0.05)
IMM GRANULOCYTES NFR BLD AUTO: 0.6 % (ref 0–0.5)
KETONES UR QL STRIP: ABNORMAL
LEUKOCYTE ESTERASE UR QL STRIP.AUTO: ABNORMAL
LYMPHOCYTES # BLD AUTO: 1.25 10*3/MM3 (ref 0.7–3.1)
LYMPHOCYTES NFR BLD AUTO: 16 % (ref 19.6–45.3)
MAGNESIUM SERPL-MCNC: 1.9 MG/DL (ref 1.6–2.6)
MCH RBC QN AUTO: 26.6 PG (ref 26.6–33)
MCHC RBC AUTO-ENTMCNC: 32.7 G/DL (ref 31.5–35.7)
MCV RBC AUTO: 81.5 FL (ref 79–97)
METHADONE UR QL SCN: NEGATIVE
MONOCYTES # BLD AUTO: 0.48 10*3/MM3 (ref 0.1–0.9)
MONOCYTES NFR BLD AUTO: 6.1 % (ref 5–12)
NEUTROPHILS # BLD AUTO: 5.72 10*3/MM3 (ref 1.7–7)
NEUTROPHILS NFR BLD AUTO: 73.4 % (ref 42.7–76)
NITRITE UR QL STRIP: NEGATIVE
NRBC BLD AUTO-RTO: 0 /100 WBC (ref 0–0.2)
NT-PROBNP SERPL-MCNC: 41 PG/ML (ref 5–900)
OPIATES UR QL: NEGATIVE
OXYCODONE UR QL SCN: NEGATIVE
PCP UR QL SCN: NEGATIVE
PH UR STRIP.AUTO: <=5 [PH] (ref 5–8)
PLATELET # BLD AUTO: 196 10*3/MM3 (ref 140–450)
PMV BLD AUTO: 10.7 FL (ref 6–12)
POTASSIUM BLD-SCNC: 3.6 MMOL/L (ref 3.5–5.2)
PROPOXYPH UR QL: NEGATIVE
PROT SERPL-MCNC: 6.9 G/DL (ref 6–8.5)
PROT UR QL STRIP: NEGATIVE
RBC # BLD AUTO: 5.07 10*6/MM3 (ref 4.14–5.8)
RBC # UR: ABNORMAL /HPF
REF LAB TEST METHOD: ABNORMAL
SODIUM BLD-SCNC: 139 MMOL/L (ref 136–145)
SP GR UR STRIP: 1.02 (ref 1–1.03)
SQUAMOUS #/AREA URNS HPF: ABNORMAL /HPF
TRICYCLICS UR QL SCN: POSITIVE
TROPONIN T SERPL-MCNC: 0.03 NG/ML (ref 0–0.03)
TROPONIN T SERPL-MCNC: 0.04 NG/ML (ref 0–0.03)
UROBILINOGEN UR QL STRIP: ABNORMAL
WBC NRBC COR # BLD: 7.81 10*3/MM3 (ref 3.4–10.8)
WBC UR QL AUTO: ABNORMAL /HPF
WHOLE BLOOD HOLD SPECIMEN: NORMAL
WHOLE BLOOD HOLD SPECIMEN: NORMAL

## 2019-09-14 PROCEDURE — 93005 ELECTROCARDIOGRAM TRACING: CPT | Performed by: EMERGENCY MEDICINE

## 2019-09-14 PROCEDURE — 83735 ASSAY OF MAGNESIUM: CPT | Performed by: NURSE PRACTITIONER

## 2019-09-14 PROCEDURE — G0378 HOSPITAL OBSERVATION PER HR: HCPCS

## 2019-09-14 PROCEDURE — 80053 COMPREHEN METABOLIC PANEL: CPT | Performed by: EMERGENCY MEDICINE

## 2019-09-14 PROCEDURE — 80306 DRUG TEST PRSMV INSTRMNT: CPT | Performed by: NURSE PRACTITIONER

## 2019-09-14 PROCEDURE — 99285 EMERGENCY DEPT VISIT HI MDM: CPT

## 2019-09-14 PROCEDURE — 99220 PR INITIAL OBSERVATION CARE/DAY 70 MINUTES: CPT | Performed by: HOSPITALIST

## 2019-09-14 PROCEDURE — 71045 X-RAY EXAM CHEST 1 VIEW: CPT

## 2019-09-14 PROCEDURE — 84484 ASSAY OF TROPONIN QUANT: CPT | Performed by: EMERGENCY MEDICINE

## 2019-09-14 PROCEDURE — 82550 ASSAY OF CK (CPK): CPT | Performed by: NURSE PRACTITIONER

## 2019-09-14 PROCEDURE — 70450 CT HEAD/BRAIN W/O DYE: CPT

## 2019-09-14 PROCEDURE — 85025 COMPLETE CBC W/AUTO DIFF WBC: CPT | Performed by: EMERGENCY MEDICINE

## 2019-09-14 PROCEDURE — 83605 ASSAY OF LACTIC ACID: CPT | Performed by: NURSE PRACTITIONER

## 2019-09-14 PROCEDURE — 81001 URINALYSIS AUTO W/SCOPE: CPT | Performed by: NURSE PRACTITIONER

## 2019-09-14 PROCEDURE — 83880 ASSAY OF NATRIURETIC PEPTIDE: CPT | Performed by: EMERGENCY MEDICINE

## 2019-09-14 RX ORDER — ASPIRIN 325 MG
325 TABLET ORAL DAILY
Status: DISCONTINUED | OUTPATIENT
Start: 2019-09-15 | End: 2019-09-16 | Stop reason: HOSPADM

## 2019-09-14 RX ORDER — SODIUM CHLORIDE 9 MG/ML
125 INJECTION, SOLUTION INTRAVENOUS CONTINUOUS
Status: DISCONTINUED | OUTPATIENT
Start: 2019-09-14 | End: 2019-09-14

## 2019-09-14 RX ORDER — PANTOPRAZOLE SODIUM 40 MG/1
40 TABLET, DELAYED RELEASE ORAL EVERY MORNING
Status: DISCONTINUED | OUTPATIENT
Start: 2019-09-15 | End: 2019-09-15

## 2019-09-14 RX ORDER — ALBUTEROL SULFATE 2.5 MG/3ML
2.5 SOLUTION RESPIRATORY (INHALATION) EVERY 6 HOURS PRN
Status: DISCONTINUED | OUTPATIENT
Start: 2019-09-14 | End: 2019-09-16 | Stop reason: HOSPADM

## 2019-09-14 RX ORDER — BUDESONIDE AND FORMOTEROL FUMARATE DIHYDRATE 80; 4.5 UG/1; UG/1
2 AEROSOL RESPIRATORY (INHALATION)
Status: DISCONTINUED | OUTPATIENT
Start: 2019-09-15 | End: 2019-09-16 | Stop reason: HOSPADM

## 2019-09-14 RX ORDER — MECLIZINE HYDROCHLORIDE 25 MG/1
25 TABLET ORAL 3 TIMES DAILY PRN
Status: DISCONTINUED | OUTPATIENT
Start: 2019-09-14 | End: 2019-09-16 | Stop reason: HOSPADM

## 2019-09-14 RX ORDER — SODIUM CHLORIDE 0.9 % (FLUSH) 0.9 %
10 SYRINGE (ML) INJECTION EVERY 12 HOURS SCHEDULED
Status: DISCONTINUED | OUTPATIENT
Start: 2019-09-15 | End: 2019-09-16 | Stop reason: HOSPADM

## 2019-09-14 RX ORDER — SODIUM CHLORIDE 9 MG/ML
50 INJECTION, SOLUTION INTRAVENOUS CONTINUOUS
Status: ACTIVE | OUTPATIENT
Start: 2019-09-15 | End: 2019-09-15

## 2019-09-14 RX ORDER — CHOLECALCIFEROL (VITAMIN D3) 125 MCG
5 CAPSULE ORAL NIGHTLY PRN
Status: DISCONTINUED | OUTPATIENT
Start: 2019-09-14 | End: 2019-09-16 | Stop reason: HOSPADM

## 2019-09-14 RX ORDER — GABAPENTIN 300 MG/1
300 CAPSULE ORAL EVERY 12 HOURS SCHEDULED
Status: DISCONTINUED | OUTPATIENT
Start: 2019-09-15 | End: 2019-09-16 | Stop reason: HOSPADM

## 2019-09-14 RX ORDER — SODIUM CHLORIDE 0.9 % (FLUSH) 0.9 %
10 SYRINGE (ML) INJECTION AS NEEDED
Status: DISCONTINUED | OUTPATIENT
Start: 2019-09-14 | End: 2019-09-16 | Stop reason: HOSPADM

## 2019-09-14 RX ORDER — ONDANSETRON 2 MG/ML
4 INJECTION INTRAMUSCULAR; INTRAVENOUS EVERY 6 HOURS PRN
Status: DISCONTINUED | OUTPATIENT
Start: 2019-09-14 | End: 2019-09-16 | Stop reason: HOSPADM

## 2019-09-14 RX ORDER — ACETAMINOPHEN 650 MG/1
650 SUPPOSITORY RECTAL EVERY 4 HOURS PRN
Status: DISCONTINUED | OUTPATIENT
Start: 2019-09-14 | End: 2019-09-16 | Stop reason: HOSPADM

## 2019-09-14 RX ORDER — RANOLAZINE 500 MG/1
500 TABLET, EXTENDED RELEASE ORAL 2 TIMES DAILY
Status: DISCONTINUED | OUTPATIENT
Start: 2019-09-15 | End: 2019-09-16 | Stop reason: HOSPADM

## 2019-09-14 RX ORDER — AMLODIPINE BESYLATE 2.5 MG/1
2.5 TABLET ORAL DAILY
Status: DISCONTINUED | OUTPATIENT
Start: 2019-09-15 | End: 2019-09-16 | Stop reason: HOSPADM

## 2019-09-14 RX ORDER — ATORVASTATIN CALCIUM 40 MG/1
80 TABLET, FILM COATED ORAL NIGHTLY
Status: DISCONTINUED | OUTPATIENT
Start: 2019-09-15 | End: 2019-09-16 | Stop reason: HOSPADM

## 2019-09-14 RX ORDER — ZAFIRLUKAST 20 MG/1
20 TABLET, FILM COATED ORAL 2 TIMES DAILY
Status: DISCONTINUED | OUTPATIENT
Start: 2019-09-15 | End: 2019-09-16 | Stop reason: HOSPADM

## 2019-09-14 RX ORDER — CETIRIZINE HYDROCHLORIDE 10 MG/1
10 TABLET ORAL DAILY
Status: DISCONTINUED | OUTPATIENT
Start: 2019-09-15 | End: 2019-09-16 | Stop reason: HOSPADM

## 2019-09-14 RX ORDER — ACETAMINOPHEN 160 MG/5ML
650 SOLUTION ORAL EVERY 4 HOURS PRN
Status: DISCONTINUED | OUTPATIENT
Start: 2019-09-14 | End: 2019-09-16 | Stop reason: HOSPADM

## 2019-09-14 RX ORDER — ACETAMINOPHEN 325 MG/1
650 TABLET ORAL EVERY 4 HOURS PRN
Status: DISCONTINUED | OUTPATIENT
Start: 2019-09-14 | End: 2019-09-16 | Stop reason: HOSPADM

## 2019-09-14 RX ORDER — HEPARIN SODIUM 5000 [USP'U]/ML
5000 INJECTION, SOLUTION INTRAVENOUS; SUBCUTANEOUS EVERY 12 HOURS SCHEDULED
Status: DISCONTINUED | OUTPATIENT
Start: 2019-09-15 | End: 2019-09-16 | Stop reason: HOSPADM

## 2019-09-14 RX ADMIN — SODIUM CHLORIDE 500 ML: 9 INJECTION, SOLUTION INTRAVENOUS at 19:12

## 2019-09-14 NOTE — ED PROVIDER NOTES
"Subjective   Patient presents to the emergency department in the company of his wife who speaks on his behalf together with the patient regarding increase in chest pain and shortness of breath today.  Further history reveals that Mr. Harding has been experiencing intermittent chest pain with shortness of breath and peripheral edema for approximately 2 weeks.  On September 5, he contacted his cardiologist Dr. Jiménez regarding the symptoms.  Dr. Jiménez responded by initiating Bumex and metolazone in place of his usual Lasix until seen in the office on September 9.  By the time the patient was seen in the office on September 9 patient had had some improvement in his edema.  Nevertheless, the patient states he is continued to have intermittent discomfort and dyspnea on exertion that was worse in the last few hours.      Patient's wife reports that he seemed confused in route to the hospital today.  She reports that he has lost approximately 20 pounds since last week after Dr. Jiménez initiated Bumex.  Patient has had a mild cough producing a thick white sputum.  Patient has not smoked in many years.  Has had no fever, vomiting, or abdominal pain.        History provided by:  Patient and spouse   used: No    Chest Pain   Pain location:  Substernal area  Pain quality: aching and crushing    Pain quality comment:  \"Feels like a small elephant on my chest\"  Pain radiates to:  Does not radiate  Pain severity:  Moderate  Onset quality:  Gradual  Timing:  Intermittent  Progression:  Unchanged  Chronicity:  Recurrent  Context: movement    Relieved by:  Oxygen  Worsened by:  Movement and exertion  Associated symptoms: altered mental status (wife states he was confused en route to the ED), cough, fatigue, lower extremity edema, nausea and shortness of breath    Associated symptoms: no abdominal pain, no diaphoresis, no fever and no vomiting    Risk factors: coronary artery disease, diabetes mellitus, high " cholesterol, hypertension, male sex and obesity        Review of Systems   Constitutional: Positive for fatigue. Negative for diaphoresis and fever.   Respiratory: Positive for cough and shortness of breath.    Cardiovascular: Positive for chest pain.   Gastrointestinal: Positive for nausea. Negative for abdominal pain and vomiting.   Endocrine:        Patient is a diabetic     Genitourinary: Negative.    Musculoskeletal: Negative.    Skin: Negative.  Negative for color change and pallor.   Allergic/Immunologic: Negative.    Neurological: Positive for light-headedness.   Hematological: Negative.    Psychiatric/Behavioral: Positive for confusion.   All other systems reviewed and are negative.      Past Medical History:   Diagnosis Date   • Anxiety    • Arthritis    • Asthma    • BiPAP (biphasic positive airway pressure) dependence    • Brachial neuritis 1/19/2017   • Chest pain    • CHF (congestive heart failure) (CMS/[facility])    • COPD (chronic obstructive pulmonary disease) (CMS/[facility])    • Coronary artery disease    • Depression    • Diabetes mellitus (CMS/[facility])    • Dizziness    • Edema    • GERD (gastroesophageal reflux disease)    • H/O chest x-ray 07/04/2015    Mild Left base atelectasis   • H/O echocardiogram 07/05/2015    Normal LVSF. EF of 60-65%. Grade 1 diastolic dysfunction of the LV myocardium. No evidence of pericardial effusion   • H/O exercise stress test    • History of herniated intervertebral disc     History of left L5-S1 disc herniation   • Hypertension    • LOM (loss of memory) 1/19/2017   • Lower back pain     Right   • Measles    • Obstructive sleep apnea    • Palpitations    • Pericardial effusion    • Seizure disorder (CMS/[facility])    • Seizures (CMS/[facility])     FOCAL    • Shortness of breath 1/19/2017   • SOB (shortness of breath)    • Stroke (CMS/[facility])    • Wears glasses        Allergies   Allergen Reactions   • Sulfa Antibiotics Anaphylaxis, Nausea And Vomiting and Delirium   • Codeine Nausea And  Vomiting     Can only take with Phenergan   • Invokana [Canagliflozin]      DKA   • Morphine      Does not work. It causes pain       Past Surgical History:   Procedure Laterality Date   • BACK SURGERY     • CARDIAC CATHETERIZATION     • CARDIAC CATHETERIZATION N/A 8/11/2017    Procedure: Coronary angiography;  Surgeon: Dallas Kim MD;  Location: [facility] CATH INVASIVE LOCATION;  Service:    • CARDIAC CATHETERIZATION N/A 8/11/2017    Procedure: Left Heart Cath;  Surgeon: Dallas Kim MD;  Location: [facility] CATH INVASIVE LOCATION;  Service:    • CARDIAC CATHETERIZATION N/A 8/11/2017    Procedure: Left ventriculography;  Surgeon: Dallas Kim MD;  Location: [facility] CATH INVASIVE LOCATION;  Service:    • CARDIOVASCULAR STRESS TEST  07/03/2017    WITH DR KIM AT [facility]   • CARPAL TUNNEL RELEASE Right    • CATARACT EXTRACTION W/ INTRAOCULAR LENS IMPLANT Right 6/12/2017    Procedure: CATARACT PHACO EXTRACTION WITH INTRAOCULAR LENS IMPLANT RIGHT ;  Surgeon: Natalie Cao MD;  Location: [facility] OR;  Service:    • CATARACT EXTRACTION W/ INTRAOCULAR LENS IMPLANT Left 7/10/2017    Procedure: CATARACT PHACO EXTRACTION WITH INTRAOCULAR LENS IMPLANT LEFT;  Surgeon: Natalie Cao MD;  Location: [facility] OR;  Service:    • CHOLECYSTECTOMY     • CHOLECYSTECTOMY     • COLONOSCOPY     • CORONARY ANGIOPLASTY WITH STENT PLACEMENT     • CORONARY ANGIOPLASTY WITH STENT PLACEMENT      X1-LAD   • CORONARY ARTERY BYPASS GRAFT N/A 8/15/2017    Procedure: CORONARY ARTERY BYPASS GRAFTING x 3 UTILIZING THE LEFT INTERNAL MAMMARY ARTERY WITH ENDOSCOPIC VEIN HARVESTING OF THE RIGHT GREATER SAPHENOUS VEIN, HAMLET, LAD ENDARECTOMY;  Surgeon: London Damon MD;  Location: [facility];  Service:    • ENDOSCOPY     • EYE SURGERY      cataract surgery both eyes   • KNEE ARTHROSCOPY Bilateral    • NEUROPLASTY / TRANSPOSITION ULNAR NERVE AT ELBOW     • OTHER SURGICAL HISTORY      Foraminotomy and discectomy   • PERICARDIAL WINDOW N/A 8/25/2017     Procedure: PERICARDIAL WINDOW;  Surgeon: Freeman Phillips MD;  [facility]: [facility];  Service:        Family History   Problem Relation Age of Onset   • Hypertension Mother    • Arthritis Mother    • Alcohol abuse Father    • Heart disease Other    • Stroke Other    • Hypertension Other    • Other Other         Neurologic disorder   • Parkinsonism Other    • Stroke Other    • Heart disease Other    • Hypertension Other    • Heart disease Other    • Stroke Other    • Hypertension Other        Social History     Socioeconomic History   • Marital status:      Spouse name: Not on file   • Number of children: 1   • Years of education: Not on file   • Highest education level: Not on file   Occupational History   • Occupation: [employer]     Comment: Disabled since -Back Problems   Tobacco Use   • Smoking status: Former Smoker     Packs/day: 1.00     Years: 10.00     Pack years: 10.00     Types: Cigarettes     Last attempt to quit: 1998     Years since quittin.7   • Smokeless tobacco: Former User     Types: Snuff     Quit date:    Substance and Sexual Activity   • Alcohol use: Yes     Comment: 3 PER YEAR   • Drug use: No   • Sexual activity: Defer   Social History Narrative    Caffeine use: 0 serving daily.    Patient lives at home with wife.            Objective   Physical Exam   Constitutional: He is oriented to person, place, and time. He appears well-developed and well-nourished.  Non-toxic appearance.   HENT:   Head: Normocephalic.   Eyes: EOM are normal. Pupils are equal, round, and reactive to light.   Neck: Normal range of motion. Neck supple.   Cardiovascular: Normal rate, regular rhythm and normal pulses. Exam reveals no S3 and no S4.   No murmur heard.  Pulmonary/Chest: Effort normal and breath sounds normal. He has no wheezes. He has no rales.   Abdominal: Soft. Bowel sounds are normal. He exhibits no distension. There is no rebound and no guarding.   Musculoskeletal:  Normal range of motion.        Right lower leg: He exhibits edema (scant ).        Left lower leg: He exhibits edema (scant).   Neurological: He is alert and oriented to person, place, and time. He is not disoriented.   Skin: Skin is warm and dry. Capillary refill takes less than 2 seconds. No pallor.   Psychiatric: His behavior is normal. His mood appears anxious.   Nursing note and vitals reviewed.      Procedures           ED Course  ED Course as of Sep 14 2210   Sat Sep 14, 2019   1837 Lactate: (!!) 2.6 [MS]   1837 Troponin T: (!!) 0.036 [MS]   1837 BUN: (!) 26 [MS]   1837 Creatinine: (!) 1.46 [MS]   2157 I have conferred with Dr. Rosas as well as hospitalist, Dr. Troy.  Patient's creatinine has doubled in approximately 1 week, likely due to his diuretic administration.  His chest pain relieved completely with administration of oxygen 2 L by nasal cannula.  Lactic acid is noted at 2.6.  Review of his medication shows that he is on Januvia and because I have found no obvious sources of infection I suspect his lactic acidosis may be due to metformin.  Dr. Troy concurs with hospitalization.  Patient and his wife understand and concur with this plan of care.  [MS]      ED Course User Index  [MS] Eloina Malhotra APRN      Recent Results (from the past 24 hour(s))   Comprehensive Metabolic Panel    Collection Time: 09/14/19  5:44 PM   Result Value Ref Range    Glucose 227 (H) 65 - 99 mg/dL    BUN 26 (H) 6 - 20 mg/dL    Creatinine 1.46 (H) 0.76 - 1.27 mg/dL    Sodium 139 136 - 145 mmol/L    Potassium 3.6 3.5 - 5.2 mmol/L    Chloride 93 (L) 98 - 107 mmol/L    CO2 34.0 (H) 22.0 - 29.0 mmol/L    Calcium 9.6 8.6 - 10.5 mg/dL    Total Protein 6.9 6.0 - 8.5 g/dL    Albumin 4.30 3.50 - 5.20 g/dL    ALT (SGPT) 55 (H) 1 - 41 U/L    AST (SGOT) 57 (H) 1 - 40 U/L    Alkaline Phosphatase 71 39 - 117 U/L    Total Bilirubin 0.7 0.2 - 1.2 mg/dL    eGFR Non African Amer 50 (L) >60 mL/min/1.73    Globulin 2.6 gm/dL    A/G  Ratio 1.7 g/dL    BUN/Creatinine Ratio 17.8 7.0 - 25.0    Anion Gap 12.0 5.0 - 15.0 mmol/L   BNP    Collection Time: 09/14/19  5:44 PM   Result Value Ref Range    proBNP 41.0 5.0 - 900.0 pg/mL   Troponin    Collection Time: 09/14/19  5:44 PM   Result Value Ref Range    Troponin T 0.036 (C) 0.000 - 0.030 ng/mL   Light Blue Top    Collection Time: 09/14/19  5:44 PM   Result Value Ref Range    Extra Tube hold for add-on    Green Top (Gel)    Collection Time: 09/14/19  5:44 PM   Result Value Ref Range    Extra Tube Hold for add-ons.    Lavender Top    Collection Time: 09/14/19  5:44 PM   Result Value Ref Range    Extra Tube hold for add-on    Gold Top - SST    Collection Time: 09/14/19  5:44 PM   Result Value Ref Range    Extra Tube Hold for add-ons.    CBC Auto Differential    Collection Time: 09/14/19  5:44 PM   Result Value Ref Range    WBC 7.81 3.40 - 10.80 10*3/mm3    RBC 5.07 4.14 - 5.80 10*6/mm3    Hemoglobin 13.5 13.0 - 17.7 g/dL    Hematocrit 41.3 37.5 - 51.0 %    MCV 81.5 79.0 - 97.0 fL    MCH 26.6 26.6 - 33.0 pg    MCHC 32.7 31.5 - 35.7 g/dL    RDW 13.5 12.3 - 15.4 %    RDW-SD 40.3 37.0 - 54.0 fl    MPV 10.7 6.0 - 12.0 fL    Platelets 196 140 - 450 10*3/mm3    Neutrophil % 73.4 42.7 - 76.0 %    Lymphocyte % 16.0 (L) 19.6 - 45.3 %    Monocyte % 6.1 5.0 - 12.0 %    Eosinophil % 3.3 0.3 - 6.2 %    Basophil % 0.6 0.0 - 1.5 %    Immature Grans % 0.6 (H) 0.0 - 0.5 %    Neutrophils, Absolute 5.72 1.70 - 7.00 10*3/mm3    Lymphocytes, Absolute 1.25 0.70 - 3.10 10*3/mm3    Monocytes, Absolute 0.48 0.10 - 0.90 10*3/mm3    Eosinophils, Absolute 0.26 0.00 - 0.40 10*3/mm3    Basophils, Absolute 0.05 0.00 - 0.20 10*3/mm3    Immature Grans, Absolute 0.05 0.00 - 0.05 10*3/mm3    nRBC 0.0 0.0 - 0.2 /100 WBC   Lactic Acid, Plasma    Collection Time: 09/14/19  5:45 PM   Result Value Ref Range    Lactate 2.6 (C) 0.5 - 2.0 mmol/L   CK    Collection Time: 09/14/19  5:45 PM   Result Value Ref Range    Creatine Kinase 127 20 - 200  U/L   Magnesium    Collection Time: 09/14/19  5:45 PM   Result Value Ref Range    Magnesium 1.9 1.6 - 2.6 mg/dL   Lactic Acid, Reflex Timer (This will reflex a repeat order 3-3:15 hours after ordered.)    Collection Time: 09/14/19  5:45 PM   Result Value Ref Range    Extra Tube Hold for add-ons.    Urinalysis With Microscopic If Indicated (No Culture) - Urine, Clean Catch    Collection Time: 09/14/19  7:20 PM   Result Value Ref Range    Color, UA Dark Yellow (A) Yellow, Straw    Appearance, UA Clear Clear    pH, UA <=5.0 5.0 - 8.0    Specific Gravity, UA 1.016 1.001 - 1.030    Glucose, UA Negative Negative    Ketones, UA Trace (A) Negative    Bilirubin, UA Small (1+) (A) Negative    Blood, UA Negative Negative    Protein, UA Negative Negative    Leuk Esterase, UA Trace (A) Negative    Nitrite, UA Negative Negative    Urobilinogen, UA 2.0 E.U./dL (A) 0.2 - 1.0 E.U./dL   Urine Drug Screen - Urine, Clean Catch    Collection Time: 09/14/19  7:20 PM   Result Value Ref Range    THC, Screen, Urine Negative Negative    Phencyclidine (PCP), Urine Negative Negative    Cocaine Screen, Urine Negative Negative    Methamphetamine, Ur Negative Negative    Opiate Screen Negative Negative    Amphetamine Screen, Urine Negative Negative    Benzodiazepine Screen, Urine Negative Negative    Tricyclic Antidepressants Screen Positive (A) Negative    Methadone Screen, Urine Negative Negative    Barbiturates Screen, Urine Negative Negative    Oxycodone Screen, Urine Negative Negative    Propoxyphene Screen Negative Negative    Buprenorphine, Screen, Urine Negative Negative   Urinalysis, Microscopic Only - Urine, Clean Catch    Collection Time: 09/14/19  7:20 PM   Result Value Ref Range    RBC, UA 0-2 None Seen, 0-2 /HPF    WBC, UA 3-5 (A) None Seen, 0-2 /HPF    Bacteria, UA None Seen None Seen, Trace /HPF    Squamous Epithelial Cells, UA 0-2 None Seen, 0-2 /HPF    Hyaline Casts, UA 0-6 0 - 6 /LPF    Methodology Manual Light Microscopy     Troponin    Collection Time: 09/14/19  7:55 PM   Result Value Ref Range    Troponin T 0.028 0.000 - 0.030 ng/mL     Note: In addition to lab results from this visit, the labs listed above may include labs taken at another facility or during a different encounter within the last 24 hours. Please correlate lab times with ED admission and discharge times for further clarification of the services performed during this visit.    CT Head Without Contrast   Final Result      1. No acute intracranial abnormality.   2. Mucosal thickening in the ethmoid air cells. Air-fluid level in the left frontal sinus characteristic of sinusitis..               Signer Name: Leonel Dia MD    Signed: 9/14/2019 7:42 PM    Workstation Name: [id number]     [facility]      XR Chest 1 View   Preliminary Result   Low lung volumes with hypoventilatory findings however no   overt edema or significant effusion with overall similar appearance to   recent prior comparison dated 09/06/2019.       DICTATED:   09/14/2019   EDITED/ls :   09/14/2019                 Vitals:    09/14/19 1743 09/14/19 1756 09/14/19 1912 09/14/19 2156   BP:       Pulse: 83 84 81 79   Resp:       Temp:       TempSrc:       SpO2: 92% 90% 96% 96%   Weight:       Height:         Medications   sodium chloride 0.9 % flush 10 mL (not administered)   sodium chloride 0.9 % infusion (not administered)   sodium chloride 0.9 % bolus 500 mL (500 mL Intravenous New Bag 9/14/19 1912)     ECG/EMG Results (last 24 hours)     Procedure Component Value Units Date/Time    ECG 12 Lead [[id number]] Collected:  09/14/19 2006     Updated:  09/14/19 2146    ECG 12 Lead [[id number]] Collected:  09/14/19 2017     Updated:  09/14/19 2147    ECG 12 Lead [[id number]] Collected:  09/14/19 1707     Updated:  09/14/19 2201        ECG 12 Lead         ECG 12 Lead         ECG 12 Lead                         MDM    Final diagnoses:   Chest pain, unspecified type   Elevated troponin    JUAN (acute kidney injury) (CMS/[facility])   History of CHF (congestive heart failure)   Essential hypertension   Type 2 diabetes mellitus with hyperglycemia, unspecified whether long term insulin use (CMS/[facility])   History of coronary artery disease              Eloina Malhotra, ANTONIO  09/14/19 2205       Eloina Malhotra, ANTONIO  09/14/19 2212

## 2019-09-15 LAB
ANION GAP SERPL CALCULATED.3IONS-SCNC: 13 MMOL/L (ref 5–15)
BILIRUB UR QL STRIP: NEGATIVE
BUN BLD-MCNC: 26 MG/DL (ref 6–20)
BUN/CREAT SERPL: 22 (ref 7–25)
CALCIUM SPEC-SCNC: 9.2 MG/DL (ref 8.6–10.5)
CHLORIDE SERPL-SCNC: 96 MMOL/L (ref 98–107)
CLARITY UR: CLEAR
CO2 SERPL-SCNC: 30 MMOL/L (ref 22–29)
COLOR UR: YELLOW
CREAT BLD-MCNC: 1.18 MG/DL (ref 0.76–1.27)
DEPRECATED RDW RBC AUTO: 42.1 FL (ref 37–54)
ERYTHROCYTE [DISTWIDTH] IN BLOOD BY AUTOMATED COUNT: 13.8 % (ref 12.3–15.4)
GFR SERPL CREATININE-BSD FRML MDRD: 63 ML/MIN/1.73
GLUCOSE BLD-MCNC: 128 MG/DL (ref 65–99)
GLUCOSE BLDC GLUCOMTR-MCNC: 117 MG/DL (ref 70–130)
GLUCOSE BLDC GLUCOMTR-MCNC: 208 MG/DL (ref 70–130)
GLUCOSE BLDC GLUCOMTR-MCNC: 302 MG/DL (ref 70–130)
GLUCOSE BLDC GLUCOMTR-MCNC: 303 MG/DL (ref 70–130)
GLUCOSE UR STRIP-MCNC: ABNORMAL MG/DL
HCT VFR BLD AUTO: 40.4 % (ref 37.5–51)
HGB BLD-MCNC: 12.9 G/DL (ref 13–17.7)
HGB UR QL STRIP.AUTO: NEGATIVE
KETONES UR QL STRIP: NEGATIVE
LEUKOCYTE ESTERASE UR QL STRIP.AUTO: NEGATIVE
MCH RBC QN AUTO: 26.8 PG (ref 26.6–33)
MCHC RBC AUTO-ENTMCNC: 31.9 G/DL (ref 31.5–35.7)
MCV RBC AUTO: 83.8 FL (ref 79–97)
NITRITE UR QL STRIP: NEGATIVE
PH UR STRIP.AUTO: <=5 [PH] (ref 5–8)
PLATELET # BLD AUTO: 174 10*3/MM3 (ref 140–450)
PMV BLD AUTO: 10.7 FL (ref 6–12)
POTASSIUM BLD-SCNC: 3.4 MMOL/L (ref 3.5–5.2)
PROT UR QL STRIP: NEGATIVE
RBC # BLD AUTO: 4.82 10*6/MM3 (ref 4.14–5.8)
SODIUM BLD-SCNC: 139 MMOL/L (ref 136–145)
SP GR UR STRIP: 1.02 (ref 1–1.03)
TROPONIN T SERPL-MCNC: <0.01 NG/ML (ref 0–0.03)
UROBILINOGEN UR QL STRIP: ABNORMAL
WBC NRBC COR # BLD: 6.8 10*3/MM3 (ref 3.4–10.8)

## 2019-09-15 PROCEDURE — G0378 HOSPITAL OBSERVATION PER HR: HCPCS

## 2019-09-15 PROCEDURE — 96372 THER/PROPH/DIAG INJ SC/IM: CPT

## 2019-09-15 PROCEDURE — 93005 ELECTROCARDIOGRAM TRACING: CPT | Performed by: PHYSICIAN ASSISTANT

## 2019-09-15 PROCEDURE — 99226 PR SBSQ OBSERVATION CARE/DAY 35 MINUTES: CPT | Performed by: FAMILY MEDICINE

## 2019-09-15 PROCEDURE — 85027 COMPLETE CBC AUTOMATED: CPT | Performed by: PHYSICIAN ASSISTANT

## 2019-09-15 PROCEDURE — 63710000001 INSULIN LISPRO (HUMAN) PER 5 UNITS: Performed by: PHYSICIAN ASSISTANT

## 2019-09-15 PROCEDURE — 82962 GLUCOSE BLOOD TEST: CPT

## 2019-09-15 PROCEDURE — 80048 BASIC METABOLIC PNL TOTAL CA: CPT | Performed by: PHYSICIAN ASSISTANT

## 2019-09-15 PROCEDURE — 94799 UNLISTED PULMONARY SVC/PX: CPT

## 2019-09-15 PROCEDURE — 81003 URINALYSIS AUTO W/O SCOPE: CPT | Performed by: FAMILY MEDICINE

## 2019-09-15 PROCEDURE — 25010000002 HEPARIN (PORCINE) PER 1000 UNITS: Performed by: PHYSICIAN ASSISTANT

## 2019-09-15 PROCEDURE — 94640 AIRWAY INHALATION TREATMENT: CPT

## 2019-09-15 PROCEDURE — 84484 ASSAY OF TROPONIN QUANT: CPT | Performed by: PHYSICIAN ASSISTANT

## 2019-09-15 PROCEDURE — 99222 1ST HOSP IP/OBS MODERATE 55: CPT | Performed by: INTERNAL MEDICINE

## 2019-09-15 RX ORDER — NICOTINE POLACRILEX 4 MG
15 LOZENGE BUCCAL
Status: DISCONTINUED | OUTPATIENT
Start: 2019-09-15 | End: 2019-09-16 | Stop reason: HOSPADM

## 2019-09-15 RX ORDER — POTASSIUM CHLORIDE 750 MG/1
40 CAPSULE, EXTENDED RELEASE ORAL AS NEEDED
Status: DISCONTINUED | OUTPATIENT
Start: 2019-09-15 | End: 2019-09-16 | Stop reason: HOSPADM

## 2019-09-15 RX ORDER — POTASSIUM CHLORIDE 1.5 G/1.77G
40 POWDER, FOR SOLUTION ORAL AS NEEDED
Status: DISCONTINUED | OUTPATIENT
Start: 2019-09-15 | End: 2019-09-16 | Stop reason: HOSPADM

## 2019-09-15 RX ORDER — PANTOPRAZOLE SODIUM 40 MG/1
40 TABLET, DELAYED RELEASE ORAL NIGHTLY
Status: DISCONTINUED | OUTPATIENT
Start: 2019-09-15 | End: 2019-09-16 | Stop reason: HOSPADM

## 2019-09-15 RX ORDER — DEXTROSE MONOHYDRATE 25 G/50ML
25 INJECTION, SOLUTION INTRAVENOUS
Status: DISCONTINUED | OUTPATIENT
Start: 2019-09-15 | End: 2019-09-16 | Stop reason: HOSPADM

## 2019-09-15 RX ORDER — NITROGLYCERIN 0.4 MG/1
0.4 TABLET SUBLINGUAL ONCE
Status: COMPLETED | OUTPATIENT
Start: 2019-09-15 | End: 2019-09-15

## 2019-09-15 RX ADMIN — RANOLAZINE 500 MG: 500 TABLET, FILM COATED, EXTENDED RELEASE ORAL at 08:07

## 2019-09-15 RX ADMIN — RANOLAZINE 500 MG: 500 TABLET, FILM COATED, EXTENDED RELEASE ORAL at 21:17

## 2019-09-15 RX ADMIN — POTASSIUM CHLORIDE 40 MEQ: 750 CAPSULE, EXTENDED RELEASE ORAL at 17:55

## 2019-09-15 RX ADMIN — ATORVASTATIN CALCIUM 80 MG: 40 TABLET, FILM COATED ORAL at 21:20

## 2019-09-15 RX ADMIN — BUDESONIDE AND FORMOTEROL FUMARATE DIHYDRATE 2 PUFF: 80; 4.5 AEROSOL RESPIRATORY (INHALATION) at 19:55

## 2019-09-15 RX ADMIN — SODIUM CHLORIDE, PRESERVATIVE FREE 10 ML: 5 INJECTION INTRAVENOUS at 00:30

## 2019-09-15 RX ADMIN — GABAPENTIN 300 MG: 300 CAPSULE ORAL at 21:17

## 2019-09-15 RX ADMIN — CETIRIZINE HYDROCHLORIDE 10 MG: 10 TABLET, FILM COATED ORAL at 08:08

## 2019-09-15 RX ADMIN — POTASSIUM CHLORIDE 40 MEQ: 750 CAPSULE, EXTENDED RELEASE ORAL at 12:20

## 2019-09-15 RX ADMIN — MELATONIN TAB 5 MG 5 MG: 5 TAB at 00:22

## 2019-09-15 RX ADMIN — HEPARIN SODIUM 5000 UNITS: 5000 INJECTION, SOLUTION INTRAVENOUS; SUBCUTANEOUS at 00:22

## 2019-09-15 RX ADMIN — PANTOPRAZOLE SODIUM 40 MG: 40 TABLET, DELAYED RELEASE ORAL at 21:17

## 2019-09-15 RX ADMIN — MELATONIN TAB 5 MG 5 MG: 5 TAB at 21:20

## 2019-09-15 RX ADMIN — HEPARIN SODIUM 5000 UNITS: 5000 INJECTION, SOLUTION INTRAVENOUS; SUBCUTANEOUS at 21:17

## 2019-09-15 RX ADMIN — ASPIRIN 325 MG ORAL TABLET 325 MG: 325 PILL ORAL at 08:11

## 2019-09-15 RX ADMIN — ZAFIRLUKAST 20 MG: 20 TABLET, COATED ORAL at 21:25

## 2019-09-15 RX ADMIN — SODIUM CHLORIDE 50 ML/HR: 9 INJECTION, SOLUTION INTRAVENOUS at 00:30

## 2019-09-15 RX ADMIN — HEPARIN SODIUM 5000 UNITS: 5000 INJECTION, SOLUTION INTRAVENOUS; SUBCUTANEOUS at 08:07

## 2019-09-15 RX ADMIN — RANOLAZINE 500 MG: 500 TABLET, FILM COATED, EXTENDED RELEASE ORAL at 00:22

## 2019-09-15 RX ADMIN — ZAFIRLUKAST 20 MG: 20 TABLET, COATED ORAL at 08:08

## 2019-09-15 RX ADMIN — BUDESONIDE AND FORMOTEROL FUMARATE DIHYDRATE 2 PUFF: 80; 4.5 AEROSOL RESPIRATORY (INHALATION) at 10:23

## 2019-09-15 RX ADMIN — AMITRIPTYLINE HYDROCHLORIDE 75 MG: 50 TABLET, FILM COATED ORAL at 00:22

## 2019-09-15 RX ADMIN — ATORVASTATIN CALCIUM 80 MG: 40 TABLET, FILM COATED ORAL at 00:22

## 2019-09-15 RX ADMIN — AMITRIPTYLINE HYDROCHLORIDE 75 MG: 50 TABLET, FILM COATED ORAL at 21:17

## 2019-09-15 RX ADMIN — SODIUM CHLORIDE, PRESERVATIVE FREE 10 ML: 5 INJECTION INTRAVENOUS at 21:20

## 2019-09-15 RX ADMIN — INSULIN LISPRO 5 UNITS: 100 INJECTION, SOLUTION INTRAVENOUS; SUBCUTANEOUS at 17:53

## 2019-09-15 RX ADMIN — GABAPENTIN 300 MG: 300 CAPSULE ORAL at 00:22

## 2019-09-15 RX ADMIN — ZAFIRLUKAST 20 MG: 20 TABLET, COATED ORAL at 00:24

## 2019-09-15 RX ADMIN — INSULIN LISPRO 3 UNITS: 100 INJECTION, SOLUTION INTRAVENOUS; SUBCUTANEOUS at 12:19

## 2019-09-15 RX ADMIN — GABAPENTIN 300 MG: 300 CAPSULE ORAL at 08:08

## 2019-09-15 RX ADMIN — INSULIN LISPRO 5 UNITS: 100 INJECTION, SOLUTION INTRAVENOUS; SUBCUTANEOUS at 21:21

## 2019-09-15 RX ADMIN — NITROGLYCERIN 0.4 MG: 0.4 TABLET SUBLINGUAL at 00:22

## 2019-09-15 RX ADMIN — AMLODIPINE BESYLATE 2.5 MG: 2.5 TABLET ORAL at 08:07

## 2019-09-15 NOTE — H&P
"    [facility] Medicine Services  HISTORY AND PHYSICAL    Patient Name: Denzel Harding  : 1960  MRN: [id number]  Primary Care Physician: Ottoniel Toledo MD  Date of admission: 2019      Subjective   Subjective     Chief Complaint:  Chest pain    HPI:  Denzel Harding is a 59 y.o. male with a past medical history significant for chronic diastolic heart failure, COPD, HTN, HLD, seizure disorder, DM2, GERD, and EDD on BiPAP nightly who presents with complaints of chest pain and AMS. Wife at bedside provides history. States today the awoke with substernal chest pain and SOB. Pain characterized as \" an elephant sitting on his chest\". There is radiation to back and diaphoresis. Symptoms have been intermittent throughout the day. No modifying factors. Wife at bedside however explains that for the past 2-3 days, patient has reported auditory and visual hallucinations, episodes of memory loss and confusion, mild productive cough, plus generalized weakness. She initially suspected a UTI as the patient has complaints of urgency, decreased urine output, and concentrated urine.    Wife volunteers that patient's cardiologist, Dr. Jiménez was contacted on  with complains of chest pain, lower extremity edema, and dyspnea on exertion. Despite being on Lasix, patient had apparently gained around 30 pounds over the summer. Recommendations were to start Bumex and metolazone in place on the lasix. He followed up in office on , but didn't start the Bumex and metolazone until 9/10. Since then, patient reports losing about 25 pounds over 4 days. Dyspnea has improved somewhat.     He is currently without complaints of fever, syncope, headache, or focal weakness/parathesias. He did wear an event monitor in August which was unremarkable and had a normal stress test in 2018. Echocardiogram on  demonstrated some mild valvular disease with EF 55%.    Emergency Department Evaluation; " hypotensive to 98/56. Vitals otherwise stable. Troponin negative. Glucose 227. Creatine 1.46. BUN 26. AST 57. ALT 55. UA, CT head negative but did note sinusitis. EKG without acute changes.    Review of Systems   Constitutional: Negative for chills and fever.   HENT: Negative for congestion and trouble swallowing.    Eyes: Negative for photophobia and visual disturbance.   Respiratory: Positive for cough and shortness of breath.    Cardiovascular: Positive for chest pain. Negative for leg swelling.   Gastrointestinal: Negative for abdominal pain, diarrhea, nausea and vomiting.   Endocrine: Negative for cold intolerance and heat intolerance.   Genitourinary: Positive for decreased urine volume, dysuria and frequency.   Musculoskeletal: Negative for back pain and joint swelling.   Skin: Negative for pallor and rash.   Allergic/Immunologic: Negative for immunocompromised state.   Neurological: Positive for weakness. Negative for dizziness and headaches.   Hematological: Negative for adenopathy.   Psychiatric/Behavioral: Positive for confusion. Negative for agitation.          All other systems reviewed and are negative.     Personal History     Past Medical History:   Diagnosis Date   • Anxiety    • Arthritis    • Asthma    • BiPAP (biphasic positive airway pressure) dependence    • Brachial neuritis 1/19/2017   • Chest pain    • CHF (congestive heart failure) (CMS/[facility])    • COPD (chronic obstructive pulmonary disease) (CMS/[facility])    • Coronary artery disease    • Depression    • Diabetes mellitus (CMS/[facility])    • Dizziness    • Edema    • GERD (gastroesophageal reflux disease)    • H/O chest x-ray 07/04/2015    Mild Left base atelectasis   • H/O echocardiogram 07/05/2015    Normal LVSF. EF of 60-65%. Grade 1 diastolic dysfunction of the LV myocardium. No evidence of pericardial effusion   • H/O exercise stress test    • History of herniated intervertebral disc     History of left L5-S1 disc herniation   • Hypertension     • LOM (loss of memory) 1/19/2017   • Lower back pain     Right   • Measles    • Obstructive sleep apnea    • Palpitations    • Pericardial effusion    • Seizure disorder (CMS/HCC)    • Seizures (CMS/HCC)     FOCAL    • Shortness of breath 1/19/2017   • SOB (shortness of breath)    • Stroke (CMS/HCC)    • Wears glasses        Past Surgical History:   Procedure Laterality Date   • BACK SURGERY     • CARDIAC CATHETERIZATION     • CARDIAC CATHETERIZATION N/A 8/11/2017    Procedure: Coronary angiography;  Surgeon: Dallas Kim MD;  Location: [facility] CATH INVASIVE LOCATION;  Service:    • CARDIAC CATHETERIZATION N/A 8/11/2017    Procedure: Left Heart Cath;  Surgeon: Dallas Kim MD;  Location: [facility] CATH INVASIVE LOCATION;  Service:    • CARDIAC CATHETERIZATION N/A 8/11/2017    Procedure: Left ventriculography;  Surgeon: Dallas Kim MD;  Location: [facility] CATH INVASIVE LOCATION;  Service:    • CARDIOVASCULAR STRESS TEST  07/03/2017    WITH DR KIM AT [facility]   • CARPAL TUNNEL RELEASE Right    • CATARACT EXTRACTION W/ INTRAOCULAR LENS IMPLANT Right 6/12/2017    Procedure: CATARACT PHACO EXTRACTION WITH INTRAOCULAR LENS IMPLANT RIGHT ;  Surgeon: Natalie Cao MD;  Location: [facility] OR;  Service:    • CATARACT EXTRACTION W/ INTRAOCULAR LENS IMPLANT Left 7/10/2017    Procedure: CATARACT PHACO EXTRACTION WITH INTRAOCULAR LENS IMPLANT LEFT;  Surgeon: Natalie Cao MD;  Location: [facility] OR;  Service:    • CHOLECYSTECTOMY     • CHOLECYSTECTOMY     • COLONOSCOPY     • CORONARY ANGIOPLASTY WITH STENT PLACEMENT     • CORONARY ANGIOPLASTY WITH STENT PLACEMENT      X1-LAD   • CORONARY ARTERY BYPASS GRAFT N/A 8/15/2017    Procedure: CORONARY ARTERY BYPASS GRAFTING x 3 UTILIZING THE LEFT INTERNAL MAMMARY ARTERY WITH ENDOSCOPIC VEIN HARVESTING OF THE RIGHT GREATER SAPHENOUS VEIN, HAMLET, LAD ENDARECTOMY;  Surgeon: London Damon MD;  Location: [facility] OR;  Service:    • ENDOSCOPY     • EYE SURGERY      cataract surgery both  eyes   • KNEE ARTHROSCOPY Bilateral    • NEUROPLASTY / TRANSPOSITION ULNAR NERVE AT ELBOW     • OTHER SURGICAL HISTORY      Foraminotomy and discectomy   • PERICARDIAL WINDOW N/A 8/25/2017    Procedure: PERICARDIAL WINDOW;  Surgeon: Freeman Phillips MD;  Location: [facility];  Service:        Family History: family history includes Alcohol abuse in his father; Arthritis in his mother; Heart disease in his other, other, and other; Hypertension in his mother, other, other, and other; Other in his other; Parkinsonism in his other; Stroke in his other, other, and other. Otherwise pertinent FHx was reviewed and unremarkable.     Social History:  reports that he quit smoking about 21 years ago. His smoking use included cigarettes. He has a 10.00 pack-year smoking history. He quit smokeless tobacco use about 2 years ago. His smokeless tobacco use included snuff. He reports that he drinks alcohol. He reports that he does not use drugs.  Social History     Social History Narrative    Caffeine use: 0 serving daily.    Patient lives at home with wife.        Medications:    Available home medication information reviewed.    (Not in a hospital admission)    Allergies   Allergen Reactions   • Sulfa Antibiotics Anaphylaxis, Nausea And Vomiting and Delirium   • Codeine Nausea And Vomiting     Can only take with Phenergan   • Invokana [Canagliflozin]      DKA   • Morphine      Does not work. It causes pain       Objective   Objective     Vital Signs:   Temp:  [98.4 °F (36.9 °C)] 98.4 °F (36.9 °C)  Heart Rate:  [78-86] 81  Resp:  [20] 20  BP: ()/(56-68) 120/68        Physical Exam   Constitutional: Awake, alert  Eyes: PERRLA, sclerae anicteric, no conjunctival injection  HENT: NCAT, mucous membranes moist  Neck: Supple, no thyromegaly, no lymphadenopathy, trachea midline  Respiratory: Clear to auscultation bilaterally, breath sounds overall diminished nonlabored respirations   Cardiovascular: RRR, no murmurs, rubs, or gallops,  palpable pedal pulses bilaterally  Gastrointestinal: Positive bowel sounds, soft, nontender, nondistended  Musculoskeletal: No bilateral ankle edema, no clubbing or cyanosis to extremities  Psychiatric: Appropriate affect, cooperative  Neurologic: Oriented x 3, strength symmetric in all extremities, Cranial Nerves grossly intact to confrontation, speech clear  Skin: No rashes      Results Reviewed:  I have personally reviewed current lab and radiology data.    Results from last 7 days   Lab Units 09/14/19  1744   WBC 10*3/mm3 7.81   HEMOGLOBIN g/dL 13.5   HEMATOCRIT % 41.3   PLATELETS 10*3/mm3 196     Results from last 7 days   Lab Units 09/14/19 2208 09/14/19 1955 09/14/19  1744   SODIUM mmol/L  --   --   --  139   POTASSIUM mmol/L  --   --   --  3.6   CHLORIDE mmol/L  --   --   --  93*   CO2 mmol/L  --   --   --  34.0*   BUN mg/dL  --   --   --  26*   CREATININE mg/dL  --   --   --  1.46*   GLUCOSE mg/dL  --   --   --  227*   CALCIUM mg/dL  --   --   --  9.6   ALT (SGPT) U/L  --   --   --  55*   AST (SGOT) U/L  --   --   --  57*   TROPONIN T ng/mL  --  0.028  --  0.036*   PROBNP pg/mL  --   --   --  41.0   LACTATE mmol/L 2.0  --    < >  --     < > = values in this interval not displayed.     Estimated Creatinine Clearance: 77.5 mL/min (A) (by C-G formula based on SCr of 1.46 mg/dL (H)).  Brief Urine Lab Results  (Last result in the past 365 days)      Color   Clarity   Blood   Leuk Est   Nitrite   Protein   CREAT   Urine HCG        09/14/19 1920 Dark Yellow Clear Negative Trace Negative Negative             Imaging Results (last 24 hours)     Procedure Component Value Units Date/Time    CT Head Without Contrast [[id number]] Collected:  09/14/19 1942     Updated:  09/14/19 1945    Narrative:       CT Head WO 9/14/2019    HISTORY:   Acute onset of confusion today, mental status change. History of stroke 2 years ago.    TECHNIQUE:   Axial unenhanced head CT. Radiation dose reduction techniques included automated  exposure control or exposure modulation based on body size. Count of known CT and cardiac nuc med studies performed in previous 12 months: 0.     Time of scan: 7:25 PM on 9/14/2019    COMPARISON:   1/28/2018    FINDINGS:   No intracranial hemorrhage, mass, or infarct. No hydrocephalus or extra-axial fluid collection. Brain parenchymal density is normal. The skull base, calvarium, and extracranial soft tissues are normal. There is mucosal thickening in the ethmoid air cells  and there is an air-fluid level in the left frontal sinus characteristic of sinusitis.      Impression:         1. No acute intracranial abnormality.  2. Mucosal thickening in the ethmoid air cells. Air-fluid level in the left frontal sinus characteristic of sinusitis..          Signer Name: Leonel Dia MD   Signed: 9/14/2019 7:42 PM   Workstation Name: [id number]    [facility]    XR Chest 1 View [[id number]] Collected:  09/14/19 1817     Updated:  09/14/19 1846    Narrative:          EXAMINATION: XR CHEST 1 VW - 09/14/2019     INDICATION: Shortness of air.     COMPARISON: None.     FINDINGS: Cardiac size within normal limits and unchanged from prior  status post median sternotomy. Low lung volumes with mild  hypoventilatory findings however no focal consolidation or overt edema.  No pneumothorax or pleural effusion.           Impression:       Low lung volumes with hypoventilatory findings however no  overt edema or significant effusion with overall similar appearance to  recent prior comparison dated 09/06/2019.     DICTATED:   09/14/2019  EDITED/ls :   09/14/2019               Results for orders placed during the hospital encounter of 05/22/19   Adult Transthoracic Echo Complete W/ Cont if Necessary Per Protocol    Narrative Technically very limited study   1) Mild LVH with normal LV systolic function ( EF is over 55%)  2) Mild left atrial enlargement   3) Trace MR and TR   4) Normal RV function        Assessment/Plan    Assessment / Plan     Active Hospital Problems    Diagnosis POA   • **Chest pain [R07.9] Yes     Priority: High   • JUAN (acute kidney injury) (CMS/[facility]) [N17.9] Yes     Priority: High   • Acute sinusitis [J01.90] Yes     Priority: High   • Acute metabolic encephalopathy [G93.41] Yes     Priority: High   • Chronic diastolic CHF (congestive heart failure) (CMS/[facility]) [I50.32] Yes     Priority: Medium   • COPD (chronic obstructive pulmonary disease) (CMS/[facility]) [J44.9] Yes     Priority: Medium   • Hypertension [I10] Yes     Priority: Medium   • Diabetic polyneuropathy associated with type 2 diabetes mellitus (CMS/[facility]) [E11.42] Yes     Priority: Medium   • Obstructive sleep apnea syndrome [G47.33] Yes     Priority: Low   • History of CVA (cerebrovascular accident) [Z86.73] Not Applicable     Priority: Low   • GERD (gastroesophageal reflux disease) [K21.9] Yes     Priority: Low   • Seizure disorder (CMS/[facility]) [G40.909] Yes     Priority: Low       59 year old male with a past medical history significant for COPD, CHF, HTN, HLD, DM2, and EDD with BiPAP admitted with chest pain, dyspnea, and AMS. Patient followed by Dr. Jiménez. Recently had Laisx switched to Bumex and metolazone. Has lost 25 pounds of fluid in 4 days. Creatinine has doubled over the past week, but improved since 9/9. Suspect overdiuresis. Will hold Bumex and other potentially nephrotoxic agents. Cardiology to see in am.    1. Chest pain/HUTCHINS/CHF  - currently chest pain free  - troponin negative X2. Continue to trend  - repeat EKG in am  - consider repeating echocardiogram  - consult to Dr. Jiménez per cardiology  - am labs    2. JUAN:  - 9/6 0.86, 9/9 1.58, today 1.46  - holding Bumex, ACEi, and decreasing gabapentin  - lactic acid elevated. Suspected dehydration secondary to overdiuresis  - administered 1L fluids in ED. Continue with gentle hydration overnight then reassess     3. Sinusitis:  - chronic. On flonase and cycloset    4. COPD:  - continue scheduled duo  "nebs and Symbicort  - history of tobacco abuse  - not on supplemental oxygen    5. DM2:  - hold oral hypoglycemics. Confirm insulin doses in am  - for now add SSI with scheduled accu checks    6. EDD:  - on BiPAP night. Continue as inpatient    DVT prophylaxis:  [facility]    CODE STATUS:  Full code  Code Status and Medical Interventions:   Ordered at: 09/14/19 5427     Level Of Support Discussed With:    Patient     Code Status:    CPR     Medical Interventions (Level of Support Prior to Arrest):    Full       Admission Status:  I believe this patient meets INPATIENT status due to chest pain/HUTCHINS with many associated chronic comorbidies concerning for ACS, which will require further inpatient work up and cardiology consultation. I feel patient’s risk for adverse outcomes and need for care warrant INPATIENT evaluation and I predict the patient’s care encounter to likely last beyond 2 midnights.        Electronically signed by Obed Vidal PA-C, 09/14/19, 11:14 PM.      I have seen and examined the patient, performing an independent face-to-face diagnostic evaluation with plan of care reviewed and developed with the advanced practice clinician (APC).        Brief Summary Statement:   Denzel Harding is a 59 y.o. male with past medical history significant for morbid obesity, obstructive sleep apnea, remote tobacco abuse entheses quit in 1999), non-oxygen dependent COPD, CAD- status post LAD stent x1 and prior CABG in August 2017 with postop course complicated by of pericarditis with effusion -status post left anterior thoracotomy and pericardial window on August 25, 2017, chronic diastolic heart failure-most recent echo was in May 2019 with an EF of greater than 55%, hypertension, hyperlipidemia, remote CVA, and controlled diabetes type 2, who presented to the emergency department [facility] for evaluation of \"chest discomfort/pain\" and worsening dyspnea on exertion.  Patient reported that since his CABG in August 2017, he " has been having intermittent chest discomfort that was tolerable, so he did not do anything about it.  Recently he began to have more discomfort/chest pain and actually visited the ED at [facility] where he was thought to be dehydrated and given 2 L of normal saline.  He was discharged home at that time and patient reported that they told him his heart was okay.  Soon after, patient noticed significant edema in his lower extremity which extended up to his abdomen with associated shortness of breath and worsening dyspnea on exertion.  He also reported weight gain of about 30 pounds at that time.  He called his cardiologist, Dr. Jiménez, last week who switch him from Lasix to Bumex and metolazone.  Patient reported that he diuresed well and lost about 20 pounds that he know of.  He had a follow-up visit with Dr. Jiménez on September 9 where his edema had significantly improved.  He continues to have inability to tolerate much physical activity due to dyspnea as well as ongoing intermittent chest pressure/discomfort, so he presented to the ED tonight for further evaluation.     In the ED, his initial troponin was mildly elevated, but has trended down some.  EKG did not show any acute ischemic changes.  His creatinine was also elevated at 1.46, baseline is normally around 0.6-0.84.  Chest x-ray showed no overt signs of congestions.  The lung is hypoventilated.  No other acute focal findings.     Remainder of detailed HPI is as noted above and has been reviewed and/or edited by me for completeness.        Attending Physical Exam:  General Assessment: No acute cardiopulmonary distress. Well developed and well nourished.     HEENT: NCAT, PERRL, MM dry     Neck: Supple, no carotid bruit bilat     CVS: RRR, S1S2 normal, no murmurs     Resp: CTAB, no adventitious sound     Abd: soft, NT, ND, normal BS, no guarding or peritoneal signs     Ext: No edema, both calves are symmetric and NTTP     Neuro: Nonfocal     Skin: W/D/I.  No rash.     Psych: Affect is appropriate     Brief Assessment/Plan :  See above for further detailed assessment and plan developed with APC which I have reviewed and/or edited for completeness.        Electronically signed by Ivis Hampton MD, 09/14/19, 11:26 PM.

## 2019-09-15 NOTE — H&P
"  Brief Attending Admission Attestation     I have seen and examined the patient, performing an independent face-to-face diagnostic evaluation with plan of care reviewed and developed with the advanced practice clinician (APC).      Brief Summary Statement:   Denzel Harding is a 59 y.o. male with past medical history significant for morbid obesity, obstructive sleep apnea, remote tobacco abuse entheses quit in 1999), non-oxygen dependent COPD, CAD– status post LAD stent x1 and prior CABG in August 2017 with postop course complicated by of pericarditis with effusion –status post left anterior thoracotomy and pericardial window on August 25, 2017, chronic diastolic heart failure–most recent echo was in May 2019 with an EF of greater than 55%, hypertension, hyperlipidemia, remote CVA, and controlled diabetes type 2, who presented to the emergency department [facility] for evaluation of \"chest discomfort/pain\" and worsening dyspnea on exertion.  Patient reported that since his CABG in August 2017, he has been having intermittent chest discomfort that was tolerable, so he did not do anything about it.  Recently he began to have more discomfort/chest pain and actually visited the ED at [facility] where he was thought to be dehydrated and given 2 L of normal saline.  He was discharged home at that time and patient reported that they told him his heart was okay.  Soon after, patient noticed significant edema in his lower extremity which extended up to his abdomen with associated shortness of breath and worsening dyspnea on exertion.  He also reported weight gain of about 30 pounds at that time.  He called his cardiologist, Dr. Jiménez, last week who switch him from Lasix to Bumex and metolazone.  Patient reported that he diuresed well and lost about 20 pounds that he know of.  He had a follow-up visit with Dr. Jiménez on September 9 where his edema had significantly improved.  He continues to have inability to tolerate much " physical activity due to dyspnea as well as ongoing intermittent chest pressure/discomfort, so he presented to the ED tonight for further evaluation.    In the ED, his initial troponin was mildly elevated, but has trended down some.  EKG did not show any acute ischemic changes.  His creatinine was also elevated at 1.46, baseline is normally around 0.6-0.84.  Chest x-ray showed no overt signs of congestions.  The lung is hypoventilated.  No other acute focal findings.    Remainder of detailed HPI is as noted above and has been reviewed and/or edited by me for completeness.      Attending Physical Exam:  General Assessment: No acute cardiopulmonary distress. Well developed and well nourished.    HEENT: NCAT, PERRL, MM dry    Neck: Supple, no carotid bruit bilat    CVS: RRR, S1S2 normal, no murmurs    Resp: CTAB, no adventitious sound    Abd: soft, NT, ND, normal BS, no guarding or peritoneal signs    Ext: No edema, both calves are symmetric and NTTP    Neuro: Nonfocal    Skin: W/D/I. No rash.    Psych: Affect is appropriate    Brief Assessment/Plan :  See above for further detailed assessment and plan developed with APC which I have reviewed and/or edited for completeness.      Electronically signed by Ivis Hampton MD, 09/14/19, 11:26 PM.

## 2019-09-15 NOTE — PROGRESS NOTES
Discharge Planning Assessment  [facility]     Patient Name: Denzel Harding  MRN: [id number]  Today's Date: 9/15/2019    Admit Date: 9/14/2019    Discharge Needs Assessment     Row Name 09/15/19 1159       Living Environment    Lives With  spouse    Name(s) of Who Lives With Patient  Carol Harding    Current Living Arrangements  home/apartment/condo    Primary Care Provided by  self    Provides Primary Care For  no one, unable/limited ability to care for self    Family Caregiver if Needed  spouse    Family Caregiver Names  Carol Harding    Quality of Family Relationships  unable to assess       Resource/Environmental Concerns    Resource/Environmental Concerns  none    Transportation Concerns  car, none       Transition Planning    Patient/Family Anticipates Transition to  home with family    Patient/Family Anticipated Services at Transition      Transportation Anticipated  family or friend will provide       Discharge Needs Assessment    Concerns to be Addressed  discharge planning    Equipment Currently Used at Home  bipap/cpap;cane, straight;nebulizer;rollator;glucometer    Equipment Needed After Discharge  none        Discharge Plan     Row Name 09/15/19 3488       Plan    Plan  Home with family    Patient/Family in Agreement with Plan  yes    Plan Comments  Spoke with pt. at bedside. Plans are to dc to home with spouse. Lives in [place] and was Ind. PTA. Has multiple DME: rollator, nebulizer, bi-pap and cane. Pt. wants O2 but current documented O2 sats do not qualify. Will cont. to monitor and update as needed.     Final Discharge Disposition Code  01 - home or self-care        Destination      No service coordination in this encounter.      Durable Medical Equipment      No service coordination in this encounter.      Dialysis/Infusion      No service coordination in this encounter.      Home Medical Care      No service coordination in this encounter.      Therapy      No service  coordination in this encounter.      Community Resources      No service coordination in this encounter.          Demographic Summary    No documentation.       Functional Status     Row Name 09/15/19 1152       Functional Status    Usual Activity Tolerance  fair       Functional Status, IADL    Medications  independent    Meal Preparation  independent    Housekeeping  independent;assistive person    Laundry  independent;assistive person    Shopping  independent;assistive person        Psychosocial    No documentation.       Abuse/Neglect    No documentation.       Legal    No documentation.       Substance Abuse    No documentation.       Patient Forms    No documentation.           Teresita Kyle RN

## 2019-09-15 NOTE — PROGRESS NOTES
[facility] Medicine Services  PROGRESS NOTE    Patient Name: Denzel Harding  : 1960  MRN: [id number]    Date of Admission: 2019  Primary Care Physician: Ottoniel Toledo MD    Subjective   Subjective     CC:  F/U Chest Pain    HPI:  Patient seen and examined.  Nursing notes reviewed.  No acute events overnight.  Patient admitted secondary to chest pain.  States he does not have pain but more of a heaviness in his chest.  States the pain worsens when he takes in a deep breath.  The pain does not change improve when he changes position.  Patient admits to shortness of breath but states this is chronic.    Review of Systems  Gen- No fevers, chills  CV- +chest pain, No palpitations  Resp- No cough, +dyspnea (at baseline)  GI- No N/V/D, abd pain    All other systems reviewed and negative except as mentioned per HPI.     Objective   Objective     Vital Signs:   Temp:  [97.1 °F (36.2 °C)-98.4 °F (36.9 °C)] 97.1 °F (36.2 °C)  Heart Rate:  [78-90] 90  Resp:  [18-20] 18  BP: ()/(56-77) 116/73        Physical Exam:  Constitutional: No acute distress, awake, alert  HENT: NCAT, mucous membranes moist  Respiratory: Clear to auscultation bilaterally, respiratory effort normal   Cardiovascular: RRR, no murmurs, rubs, or gallops, palpable pedal pulses bilaterally  Gastrointestinal: Positive bowel sounds, soft, nontender, nondistended  Musculoskeletal: B/L LE edema  Psychiatric: Appropriate affect, cooperative  Neurologic: Oriented x 3, strength symmetric in all extremities, Cranial Nerves grossly intact to confrontation, speech clear  Skin: No rashes    Results Reviewed:    Results from last 7 days   Lab Units 09/15/19  0521 19  1744   WBC 10*3/mm3 6.80 7.81   HEMOGLOBIN g/dL 12.9* 13.5   HEMATOCRIT % 40.4 41.3   PLATELETS 10*3/mm3 174 196     Results from last 7 days   Lab Units 09/15/19  0521 09/15/19  0048 19  1955 19  1744 19  1154   SODIUM mmol/L 139  --   --   139 136   POTASSIUM mmol/L 3.4*  --   --  3.6 3.8   CHLORIDE mmol/L 96*  --   --  93* 89*   CO2 mmol/L 30.0*  --   --  34.0* 31.6*   BUN mg/dL 26*  --   --  26* 23*   CREATININE mg/dL 1.18  --   --  1.46* 1.58*   GLUCOSE mg/dL 128*  --   --  227* 297*   CALCIUM mg/dL 9.2  --   --  9.6 9.6   ALT (SGPT) U/L  --   --   --  55*  --    AST (SGOT) U/L  --   --   --  57*  --    TROPONIN T ng/mL  --  <0.010 0.028 0.036*  --    PROBNP pg/mL  --   --   --  41.0  --      Estimated Creatinine Clearance: 98.4 mL/min (by C-G formula based on SCr of 1.18 mg/dL).    Microbiology Results Abnormal     None          Imaging Results (last 24 hours)     Procedure Component Value Units Date/Time    CT Head Without Contrast [[id number]] Collected:  09/14/19 1942     Updated:  09/14/19 1945    Narrative:       CT Head WO 9/14/2019    HISTORY:   Acute onset of confusion today, mental status change. History of stroke 2 years ago.    TECHNIQUE:   Axial unenhanced head CT. Radiation dose reduction techniques included automated exposure control or exposure modulation based on body size. Count of known CT and cardiac nuc med studies performed in previous 12 months: 0.     Time of scan: 7:25 PM on 9/14/2019    COMPARISON:   1/28/2018    FINDINGS:   No intracranial hemorrhage, mass, or infarct. No hydrocephalus or extra-axial fluid collection. Brain parenchymal density is normal. The skull base, calvarium, and extracranial soft tissues are normal. There is mucosal thickening in the ethmoid air cells  and there is an air-fluid level in the left frontal sinus characteristic of sinusitis.      Impression:         1. No acute intracranial abnormality.  2. Mucosal thickening in the ethmoid air cells. Air-fluid level in the left frontal sinus characteristic of sinusitis..          Signer Name: Leonel Dia MD   Signed: 9/14/2019 7:42 PM   Workstation Name: [id number]    [facility]    XR Chest 1 View [[id number]] Collected:   09/14/19 1817     Updated:  09/14/19 1846    Narrative:          EXAMINATION: XR CHEST 1 VW - 09/14/2019     INDICATION: Shortness of air.     COMPARISON: None.     FINDINGS: Cardiac size within normal limits and unchanged from prior  status post median sternotomy. Low lung volumes with mild  hypoventilatory findings however no focal consolidation or overt edema.  No pneumothorax or pleural effusion.           Impression:       Low lung volumes with hypoventilatory findings however no  overt edema or significant effusion with overall similar appearance to  recent prior comparison dated 09/06/2019.     DICTATED:   09/14/2019  EDITED/ls :   09/14/2019                 Results for orders placed during the hospital encounter of 05/22/19   Adult Transthoracic Echo Complete W/ Cont if Necessary Per Protocol    Narrative Technically very limited study   1) Mild LVH with normal LV systolic function ( EF is over 55%)  2) Mild left atrial enlargement   3) Trace MR and TR   4) Normal RV function        I have reviewed the medications:  Scheduled Meds:  amitriptyline 75 mg Oral Nightly   amLODIPine 2.5 mg Oral Daily   aspirin 325 mg Oral Daily   atorvastatin 80 mg Oral Nightly   budesonide-formoterol 2 puff Inhalation BID - RT   cetirizine 10 mg Oral Daily   gabapentin 300 mg Oral Q12H   heparin (porcine) 5,000 Units Subcutaneous Q12H   insulin lispro 0-7 Units Subcutaneous 4x Daily With Meals & Nightly   pantoprazole 40 mg Oral Nightly   ranolazine 500 mg Oral BID   sodium chloride 10 mL Intravenous Q12H   zafirlukast 20 mg Oral BID     Continuous Infusions:   PRN Meds:.•  acetaminophen **OR** acetaminophen **OR** acetaminophen  •  albuterol  •  dextrose  •  dextrose  •  glucagon (human recombinant)  •  meclizine  •  melatonin  •  ondansetron  •  sodium chloride  •  sodium chloride      Assessment/Plan   Assessment / Plan     Active Hospital Problems    Diagnosis  POA   • **Chest pain [R07.9]  Yes   • Chronic diastolic CHF  (congestive heart failure) (CMS/[facility]) [I50.32]  Yes   • COPD (chronic obstructive pulmonary disease) (CMS/[facility]) [J44.9]  Yes   • JUAN (acute kidney injury) (CMS/[facility]) [N17.9]  Yes   • Acute sinusitis [J01.90]  Yes   • Acute metabolic encephalopathy [G93.41]  Yes   • Obstructive sleep apnea syndrome [G47.33]  Yes   • History of CVA (cerebrovascular accident) [Z86.73]  Not Applicable   • Hypertension [I10]  Yes   • GERD (gastroesophageal reflux disease) [K21.9]  Yes   • Seizure disorder (CMS/[facility]) [G40.909]  Yes   • Diabetic polyneuropathy associated with type 2 diabetes mellitus (CMS/[facility]) [E11.42]  Yes      Resolved Hospital Problems   No resolved problems to display.        Brief Hospital Course to date:  Denzel Harding is a 59 y.o. male with a past medical history significant for COPD, CHF, HTN, HLD, DM2, and EDD with BiPAP admitted with chest pain, dyspnea, and AMS. Patient followed by Dr. Jiménez. Recently had Laisx switched to Bumex and metolazone. Has lost 25 pounds of fluid in 4 days. Creatinine has doubled over the past week, but improved since 9/9. Suspect overdiuresis.      1. Chest pain/HUTCHINS/CHF  -troponin's trended: initially negative, then mild elevation at 0.036 then trended down to <0.010  -No acute EKG changes  -Dr. Jiménez consulted and following, appreciate recommendations: Plan for Stress Test in AM  -Continue Telemetry      2. JUAN:  - Resolved  - Will continue to hold Bumex, ACEi  -Gabapentin dose decreased  - lactic acid initially elevated, now resolved s/p IVF  -IVF have been discontinued     3. Sinusitis per CT scan:  - chronic. On flonase and cycloset. Pt denies any sinus tenderness or congestion     4. COPD:  - continue scheduled duo nebs and Symbicort  - history of tobacco abuse  - not on supplemental oxygen     5. DM2:  - hold oral hypoglycemics  - scheduled accu checks  -LDSSI  -will check Hga1c     6. EDD:  - on BiPAP night. Continue as inpatient     7. Confusion on admission  -CT head  negative  -likely secondary to dehydration, ARF, polypharmacy  -gabapentin dose decreased  -UDS +TCA's   -will continue to monitor, confusion has resolved at this time    8. Hypokalemia  -replace per protocol      DVT prophylaxis:  SQH    Disposition: I expect the patient to be discharged home once medically stable    CODE STATUS:   Code Status and Medical Interventions:   Ordered at: 09/14/19 1447     Level Of Support Discussed With:    Patient     Code Status:    CPR     Medical Interventions (Level of Support Prior to Arrest):    Full         Electronically signed by Bella Cody DO, 09/15/19, 10:40 AM.

## 2019-09-15 NOTE — CONSULTS
[facility]  CARDIOLOGY CONSULTATION NOTE    Denzel Harding  : 1960  MRN:[id number]    Date of Admission:2019  Date of Consultation: 09/15/19    PCP: Ottoniel Toledo MD    IDENTIFICATION: A 59 y.o. male     Chief Complaint   Patient presents with   • Chest Pain       Problem List:   1. Post op pericarditis with effusion s/p left anterior thoracotomy and pericardial window 17  2. CABG x 3, LAD endarterectomy 8/15/17 Dr. Damon, Normal EF, DHF  3. Atypical Chest pain  3. HTN: Controlled, BB and Norvasc  4. CVA: Remote  5. DM  6. COPD/EDD  7. HLD     Previous cardiac studies and procedures:  2017 cardiac catheterization  · Severe three-vessel coronary artery disease  · Preserved global and regional left ventricular systolic function  · Elevated left ventricular filling pressures consistent with diastolic heart failure.  · No significant left-sided valvular abnormalities appreciated     2017  Echo  · Left ventricular systolic function is normal.  · Left ventricular diastolic dysfunction (grade I a) consistent with impaired relaxation.  · Left ventricular wall thickness is consistent with mild concentric hypertrophy.  Carotid duplex  · Proximal right internal carotid artery plaque without significant stenosis.  · Right internal carotid artery stenosis of 0-49%.  · Proximal left internal carotid artery plaque without significant stenosis.  · Left internal carotid artery stenosis of 0-49%.     2018 myocardial perfusion imaging  · Left ventricular ejection fraction is borderline normal (Calculated EF = 50%).  · Myocardial perfusion imaging indicates a normal myocardial perfusion study with no evidence of ischemia.     2019 echo  Technically very limited study   1) Mild LVH with normal LV systolic function ( EF is over 55%)  2) Mild left atrial enlargement   3) Trace MR and TR   4) Normal RV function        HPI:   59-year-old male admitted to  "[facility] with chest pain and acute mental status changes.  Patient has had in addition auditory and visual hallucinations memory loss confusion.  He has had a history of chronic chest pain the past describes this discomfort as a \"elephant sitting on chest\" radiation to the back and diaphoresis.  He has had no further chest pain since admission to the hospital.  In addition he has had volume overload and difficulty with diuresis.  He denies any current dizziness near syncope significance.    ROS: All systems have been reviewed and are negative with the exception of those mentioned in the HPI and problem list above.    Surgical History:   Past Surgical History:   Procedure Laterality Date   • BACK SURGERY     • CARDIAC CATHETERIZATION     • CARDIAC CATHETERIZATION N/A 8/11/2017    Procedure: Coronary angiography;  Surgeon: Dallas Kim MD;  Location: [facility] CATH INVASIVE LOCATION;  Service:    • CARDIAC CATHETERIZATION N/A 8/11/2017    Procedure: Left Heart Cath;  Surgeon: Dallas Kim MD;  Location: [facility] CATH INVASIVE LOCATION;  Service:    • CARDIAC CATHETERIZATION N/A 8/11/2017    Procedure: Left ventriculography;  Surgeon: Dallas Kim MD;  Location: [facility] CATH INVASIVE LOCATION;  Service:    • CARDIOVASCULAR STRESS TEST  07/03/2017    WITH DR KIM AT [facility]   • CARPAL TUNNEL RELEASE Right    • CATARACT EXTRACTION W/ INTRAOCULAR LENS IMPLANT Right 6/12/2017    Procedure: CATARACT PHACO EXTRACTION WITH INTRAOCULAR LENS IMPLANT RIGHT ;  Surgeon: Natalie Cao MD;  Location: [facility] OR;  Service:    • CATARACT EXTRACTION W/ INTRAOCULAR LENS IMPLANT Left 7/10/2017    Procedure: CATARACT PHACO EXTRACTION WITH INTRAOCULAR LENS IMPLANT LEFT;  Surgeon: Natalie Cao MD;  Location: [facility] OR;  Service:    • CHOLECYSTECTOMY     • CHOLECYSTECTOMY     • COLONOSCOPY     • CORONARY ANGIOPLASTY WITH STENT PLACEMENT     • CORONARY ANGIOPLASTY WITH STENT PLACEMENT      X1-LAD   • CORONARY ARTERY " BYPASS GRAFT N/A 8/15/2017    Procedure: CORONARY ARTERY BYPASS GRAFTING x 3 UTILIZING THE LEFT INTERNAL MAMMARY ARTERY WITH ENDOSCOPIC VEIN HARVESTING OF THE RIGHT GREATER SAPHENOUS VEIN, HAMLET, LAD ENDARECTOMY;  Surgeon: London Damon MD;  Location: [facility] OR;  Service:    • ENDOSCOPY     • EYE SURGERY      cataract surgery both eyes   • KNEE ARTHROSCOPY Bilateral    • NEUROPLASTY / TRANSPOSITION ULNAR NERVE AT ELBOW     • OTHER SURGICAL HISTORY      Foraminotomy and discectomy   • PERICARDIAL WINDOW N/A 2017    Procedure: PERICARDIAL WINDOW;  Surgeon: Freeman Phillips MD;  Location: [facility] OR;  Service:        Social History:   Social History     Socioeconomic History   • Marital status:      Spouse name: Not on file   • Number of children: 1   • Years of education: Not on file   • Highest education level: Not on file   Occupational History   • Occupation: [employer]     Comment: Disabled since -Back Problems   Tobacco Use   • Smoking status: Former Smoker     Packs/day: 1.00     Years: 10.00     Pack years: 10.00     Types: Cigarettes     Last attempt to quit: 1998     Years since quittin.7   • Smokeless tobacco: Former User     Types: Snuff     Quit date:    Substance and Sexual Activity   • Alcohol use: Yes     Comment: 3 PER YEAR   • Drug use: No   • Sexual activity: Defer   Social History Narrative    Caffeine use: 0 serving daily.    Patient lives at home with wife.        Family History:   Family History   Problem Relation Age of Onset   • Hypertension Mother    • Arthritis Mother    • Alcohol abuse Father    • Heart disease Other    • Stroke Other    • Hypertension Other    • Other Other         Neurologic disorder   • Parkinsonism Other    • Stroke Other    • Heart disease Other    • Hypertension Other    • Heart disease Other    • Stroke Other    • Hypertension Other        Objective     /73 (BP Location: Right arm, Patient Position: Lying)   Pulse 87   " Temp 97.1 °F (36.2 °C) (Oral)   Resp 18   Ht 177.8 cm (70\")   Wt (!) 146 kg (320 lb 12.8 oz)   SpO2 91%   BMI 46.03 kg/m²     Intake/Output Summary (Last 24 hours) at 9/15/2019 0844  Last data filed at 9/14/2019 2305  Gross per 24 hour   Intake 500 ml   Output --   Net 500 ml       PHYSICAL EXAM:  Constitutional:  Well-nourished, cooperative, in no acute distress.   Head:  Normocephalic, without obvious abnormality, atraumatic.Dental disease.    Neck: No adenopathy, supple, trachea midline, no thyromegaly, no    carotid bruit, no JVD.   Respiratory:   Clear to auscultation bilaterally; respirations regular, even and unlabored. No wheezes, rales or rhonchi.    Cardiovascular:  Regular rhythm and normal rate, normal S1 and S2, no            murmur, no gallop, no rub, no click.   Pulses: Peripheral pulses are present and equal bilaterally.   GI:   Soft, non-distended. Bowel sounds heard throughout. No organomegaly or masses. Non-tender to palpation, no guarding.   Extremities: + edema,  No clubbing or cyanosis.   Skin: Skin is warm and dry. No bleeding, bruising or rash.   Neurological: Alert, oriented to time, person and place. No focal deficits.     Labs/Diagnostic Data  Results from last 7 days   Lab Units 09/15/19  0521 09/14/19 1744 09/09/19  1154   SODIUM mmol/L 139 139 136   POTASSIUM mmol/L 3.4* 3.6 3.8   CHLORIDE mmol/L 96* 93* 89*   CO2 mmol/L 30.0* 34.0* 31.6*   BUN mg/dL 26* 26* 23*   CREATININE mg/dL 1.18 1.46* 1.58*   GLUCOSE mg/dL 128* 227* 297*   CALCIUM mg/dL 9.2 9.6 9.6     Results from last 7 days   Lab Units 09/15/19  0048 09/14/19 1955 09/14/19 1745 09/14/19 1744   CK TOTAL U/L  --   --  127  --    TROPONIN T ng/mL <0.010 0.028  --  0.036*     Results from last 7 days   Lab Units 09/14/19  1744   WBC 10*3/mm3 7.81   HEMOGLOBIN g/dL 13.5   HEMATOCRIT % 41.3   PLATELETS 10*3/mm3 196     Results from last 7 days   Lab Units 09/14/19  1745   MAGNESIUM mg/dL 1.9                 Results from " last 7 days   Lab Units 09/14/19  1744   PROBNP pg/mL 41.0           I personally reviewed the patient's EKG/Telemetry data    Radiology Data:     IMPRESSION: CT scan     1. No acute intracranial abnormality.  2. Mucosal thickening in the ethmoid air cells. Air-fluid level in the left frontal sinus characteristic of sinusitis..       IMPRESSION:CXR  Low lung volumes with hypoventilatory findings however no  overt edema or significant effusion with overall similar appearance to  recent prior comparison dated 09/06/2019.     DICTATED:   09/14/2019  EDITED/ls :   09/14/2019     Current Medications:      amitriptyline 75 mg Oral Nightly   amLODIPine 2.5 mg Oral Daily   aspirin 325 mg Oral Daily   atorvastatin 80 mg Oral Nightly   budesonide-formoterol 2 puff Inhalation BID - RT   cetirizine 10 mg Oral Daily   gabapentin 300 mg Oral Q12H   heparin (porcine) 5,000 Units Subcutaneous Q12H   insulin lispro 0-7 Units Subcutaneous 4x Daily With Meals & Nightly   pantoprazole 40 mg Oral Nightly   ranolazine 500 mg Oral BID   sodium chloride 10 mL Intravenous Q12H   zafirlukast 20 mg Oral BID          Assessment and Plan:     1. Chest pain syndrome: Negative MPS 12/18. Remote Post op pericarditis, chronic chest pain atypical for angina. Negative cardiac markers today,Elevated troponin yesterday in setting of JUAN. No Acute EKG changes  2. CAD:  CABG x 3 2017, Normal EF by recent Echo.   3. HTN: Controlled, BB, Norvasc  4. HLD: Statin  5. COPD/EDD  6. AMS: Per Hospitalist, reduced Neurontin. Polypharmacy.   7. Azotemia, Scr 1.46 on admit, Bumex, ACE on hold.       Electronically signed by LARRY Alcala, 09/15/19, 9:00 AM.  Scribyoni for Dr. Jiménez         Thank you for allowing me to participate in the care of Denzel Harding. Feel free to contact me directly with any further questions or concerns.    Pleuritic chest pain with no significant troponin elevation or dynamic EKG changes.  Acute renal insufficiency secondary to  recent diuresis.  Arranging for noninvasive ischemia evaluation.  Agree with holding diuretics and ACE inhibitor.  Renal function improving, discontinuing IV fluid.

## 2019-09-15 NOTE — PLAN OF CARE
Problem: Patient Care Overview  Goal: Plan of Care Review  Outcome: Ongoing (interventions implemented as appropriate)   09/15/19 0991   Coping/Psychosocial   Plan of Care Reviewed With patient   OTHER   Outcome Summary VSS on RA. Reports of chest pressure upon arrival to floor. Nitro given with reported relief. Cardiology consulted, to see in am. NPO at this time. Will continue to monitor.

## 2019-09-16 ENCOUNTER — APPOINTMENT (OUTPATIENT)
Dept: CARDIOLOGY | Facility: HOSPITAL | Age: 59
End: 2019-09-16

## 2019-09-16 VITALS
RESPIRATION RATE: 18 BRPM | OXYGEN SATURATION: 94 % | HEART RATE: 87 BPM | WEIGHT: 315 LBS | BODY MASS INDEX: 45.1 KG/M2 | SYSTOLIC BLOOD PRESSURE: 162 MMHG | HEIGHT: 70 IN | TEMPERATURE: 99 F | DIASTOLIC BLOOD PRESSURE: 84 MMHG

## 2019-09-16 PROBLEM — N17.9 AKI (ACUTE KIDNEY INJURY) (HCC): Status: RESOLVED | Noted: 2019-09-14 | Resolved: 2019-09-16

## 2019-09-16 PROBLEM — R07.9 CHEST PAIN: Status: RESOLVED | Noted: 2019-09-14 | Resolved: 2019-09-16

## 2019-09-16 PROBLEM — G93.41 ACUTE METABOLIC ENCEPHALOPATHY: Status: RESOLVED | Noted: 2019-09-14 | Resolved: 2019-09-16

## 2019-09-16 LAB
ANION GAP SERPL CALCULATED.3IONS-SCNC: 12 MMOL/L (ref 5–15)
BH CV STRESS BP STAGE 1: NORMAL
BH CV STRESS BP STAGE 4: NORMAL
BH CV STRESS COMMENTS STAGE 1: NORMAL
BH CV STRESS DOSE REGADENOSON STAGE 1: 0.4
BH CV STRESS DURATION MIN STAGE 1: 1
BH CV STRESS DURATION MIN STAGE 2: 1
BH CV STRESS DURATION MIN STAGE 3: 1
BH CV STRESS DURATION MIN STAGE 4: 1
BH CV STRESS DURATION SEC STAGE 1: 0
BH CV STRESS DURATION SEC STAGE 2: 0
BH CV STRESS DURATION SEC STAGE 3: 0
BH CV STRESS DURATION SEC STAGE 4: 0
BH CV STRESS HR STAGE 1: 97
BH CV STRESS HR STAGE 2: 101
BH CV STRESS HR STAGE 3: 100
BH CV STRESS HR STAGE 4: 99
BH CV STRESS O2 STAGE 1: 94
BH CV STRESS O2 STAGE 2: 97
BH CV STRESS O2 STAGE 3: 95
BH CV STRESS O2 STAGE 4: 93
BH CV STRESS PROTOCOL 1: NORMAL
BH CV STRESS RECOVERY BP: NORMAL MMHG
BH CV STRESS RECOVERY HR: 96 BPM
BH CV STRESS RECOVERY O2: 92 %
BH CV STRESS STAGE 1: 1
BH CV STRESS STAGE 2: 2
BH CV STRESS STAGE 3: 3
BH CV STRESS STAGE 4: 4
BUN BLD-MCNC: 18 MG/DL (ref 6–20)
BUN/CREAT SERPL: 20.7 (ref 7–25)
CALCIUM SPEC-SCNC: 9.3 MG/DL (ref 8.6–10.5)
CHLORIDE SERPL-SCNC: 98 MMOL/L (ref 98–107)
CO2 SERPL-SCNC: 27 MMOL/L (ref 22–29)
CREAT BLD-MCNC: 0.87 MG/DL (ref 0.76–1.27)
DEPRECATED RDW RBC AUTO: 40.3 FL (ref 37–54)
ERYTHROCYTE [DISTWIDTH] IN BLOOD BY AUTOMATED COUNT: 13.5 % (ref 12.3–15.4)
GFR SERPL CREATININE-BSD FRML MDRD: 90 ML/MIN/1.73
GLUCOSE BLD-MCNC: 295 MG/DL (ref 65–99)
GLUCOSE BLDC GLUCOMTR-MCNC: 191 MG/DL (ref 70–130)
GLUCOSE BLDC GLUCOMTR-MCNC: 197 MG/DL (ref 70–130)
GLUCOSE BLDC GLUCOMTR-MCNC: 271 MG/DL (ref 70–130)
HBA1C MFR BLD: 8.8 % (ref 4.8–5.6)
HCT VFR BLD AUTO: 40.7 % (ref 37.5–51)
HGB BLD-MCNC: 13.1 G/DL (ref 13–17.7)
LV EF NUC BP: 67 %
MAXIMAL PREDICTED HEART RATE: 162 BPM
MCH RBC QN AUTO: 26.7 PG (ref 26.6–33)
MCHC RBC AUTO-ENTMCNC: 32.2 G/DL (ref 31.5–35.7)
MCV RBC AUTO: 82.9 FL (ref 79–97)
PERCENT MAX PREDICTED HR: 62.35 %
PLATELET # BLD AUTO: 161 10*3/MM3 (ref 140–450)
PMV BLD AUTO: 10.5 FL (ref 6–12)
POTASSIUM BLD-SCNC: 4.4 MMOL/L (ref 3.5–5.2)
RBC # BLD AUTO: 4.91 10*6/MM3 (ref 4.14–5.8)
SODIUM BLD-SCNC: 137 MMOL/L (ref 136–145)
STRESS BASELINE BP: NORMAL MMHG
STRESS BASELINE HR: 91 BPM
STRESS O2 SAT REST: 93 %
STRESS PERCENT HR: 73 %
STRESS POST ESTIMATED WORKLOAD: 1 METS
STRESS POST EXERCISE DUR MIN: 4 MIN
STRESS POST EXERCISE DUR SEC: 0 SEC
STRESS POST O2 SAT PEAK: 97 %
STRESS POST PEAK BP: NORMAL MMHG
STRESS POST PEAK HR: 101 BPM
STRESS TARGET HR: 138 BPM
WBC NRBC COR # BLD: 5.35 10*3/MM3 (ref 3.4–10.8)

## 2019-09-16 PROCEDURE — 85027 COMPLETE CBC AUTOMATED: CPT | Performed by: FAMILY MEDICINE

## 2019-09-16 PROCEDURE — 96372 THER/PROPH/DIAG INJ SC/IM: CPT

## 2019-09-16 PROCEDURE — G0378 HOSPITAL OBSERVATION PER HR: HCPCS

## 2019-09-16 PROCEDURE — 25010000002 REGADENOSON 0.4 MG/5ML SOLUTION: Performed by: INTERNAL MEDICINE

## 2019-09-16 PROCEDURE — 63710000001 INSULIN LISPRO (HUMAN) PER 5 UNITS: Performed by: PHYSICIAN ASSISTANT

## 2019-09-16 PROCEDURE — 78492 MYOCRD IMG PET MLT RST&STRS: CPT

## 2019-09-16 PROCEDURE — 25010000002 HEPARIN (PORCINE) PER 1000 UNITS: Performed by: PHYSICIAN ASSISTANT

## 2019-09-16 PROCEDURE — 99217 PR OBSERVATION CARE DISCHARGE MANAGEMENT: CPT | Performed by: NURSE PRACTITIONER

## 2019-09-16 PROCEDURE — 80048 BASIC METABOLIC PNL TOTAL CA: CPT | Performed by: FAMILY MEDICINE

## 2019-09-16 PROCEDURE — 82962 GLUCOSE BLOOD TEST: CPT

## 2019-09-16 PROCEDURE — 93018 CV STRESS TEST I&R ONLY: CPT | Performed by: INTERNAL MEDICINE

## 2019-09-16 PROCEDURE — 63710000001 INSULIN LISPRO (HUMAN) PER 5 UNITS: Performed by: NURSE PRACTITIONER

## 2019-09-16 PROCEDURE — 93017 CV STRESS TEST TRACING ONLY: CPT

## 2019-09-16 PROCEDURE — 83036 HEMOGLOBIN GLYCOSYLATED A1C: CPT | Performed by: FAMILY MEDICINE

## 2019-09-16 PROCEDURE — 97166 OT EVAL MOD COMPLEX 45 MIN: CPT

## 2019-09-16 PROCEDURE — 99214 OFFICE O/P EST MOD 30 MIN: CPT | Performed by: INTERNAL MEDICINE

## 2019-09-16 PROCEDURE — 0 RUBIDIUM CHLORIDE: Performed by: INTERNAL MEDICINE

## 2019-09-16 PROCEDURE — 97161 PT EVAL LOW COMPLEX 20 MIN: CPT

## 2019-09-16 PROCEDURE — A9555 RB82 RUBIDIUM: HCPCS | Performed by: INTERNAL MEDICINE

## 2019-09-16 PROCEDURE — 78492 MYOCRD IMG PET MLT RST&STRS: CPT | Performed by: INTERNAL MEDICINE

## 2019-09-16 RX ORDER — ACETAMINOPHEN 325 MG/1
650 TABLET ORAL EVERY 4 HOURS PRN
Start: 2019-09-16 | End: 2019-12-29

## 2019-09-16 RX ORDER — BUMETANIDE 2 MG/1
2 TABLET ORAL DAILY
Start: 2019-09-16 | End: 2019-10-02 | Stop reason: SDUPTHER

## 2019-09-16 RX ORDER — PANTOPRAZOLE SODIUM 40 MG/1
40 TABLET, DELAYED RELEASE ORAL NIGHTLY
Qty: 30 TABLET | Refills: 0 | Status: SHIPPED | OUTPATIENT
Start: 2019-09-16 | End: 2019-10-15

## 2019-09-16 RX ADMIN — RUBIDIUM CHLORIDE RB-82 1 DOSE: 150 INJECTION, SOLUTION INTRAVENOUS at 09:10

## 2019-09-16 RX ADMIN — SODIUM CHLORIDE, PRESERVATIVE FREE 10 ML: 5 INJECTION INTRAVENOUS at 09:47

## 2019-09-16 RX ADMIN — INSULIN LISPRO 2 UNITS: 100 INJECTION, SOLUTION INTRAVENOUS; SUBCUTANEOUS at 17:37

## 2019-09-16 RX ADMIN — REGADENOSON 0.4 MG: 0.08 INJECTION, SOLUTION INTRAVENOUS at 09:09

## 2019-09-16 RX ADMIN — HEPARIN SODIUM 5000 UNITS: 5000 INJECTION, SOLUTION INTRAVENOUS; SUBCUTANEOUS at 09:46

## 2019-09-16 RX ADMIN — INSULIN LISPRO 2 UNITS: 100 INJECTION, SOLUTION INTRAVENOUS; SUBCUTANEOUS at 09:46

## 2019-09-16 RX ADMIN — CETIRIZINE HYDROCHLORIDE 10 MG: 10 TABLET, FILM COATED ORAL at 09:46

## 2019-09-16 RX ADMIN — ASPIRIN 325 MG ORAL TABLET 325 MG: 325 PILL ORAL at 09:46

## 2019-09-16 RX ADMIN — AMLODIPINE BESYLATE 2.5 MG: 2.5 TABLET ORAL at 09:46

## 2019-09-16 RX ADMIN — RANOLAZINE 500 MG: 500 TABLET, FILM COATED, EXTENDED RELEASE ORAL at 09:46

## 2019-09-16 RX ADMIN — INSULIN LISPRO 4 UNITS: 100 INJECTION, SOLUTION INTRAVENOUS; SUBCUTANEOUS at 17:37

## 2019-09-16 RX ADMIN — RUBIDIUM CHLORIDE RB-82 1 DOSE: 150 INJECTION, SOLUTION INTRAVENOUS at 08:55

## 2019-09-16 RX ADMIN — GABAPENTIN 300 MG: 300 CAPSULE ORAL at 09:46

## 2019-09-16 RX ADMIN — ZAFIRLUKAST 20 MG: 20 TABLET, COATED ORAL at 12:27

## 2019-09-16 RX ADMIN — INSULIN LISPRO 2 UNITS: 100 INJECTION, SOLUTION INTRAVENOUS; SUBCUTANEOUS at 12:27

## 2019-09-16 NOTE — THERAPY EVALUATION
Patient Name: Denzel Harding  : 1960    MRN: [id number]                              Today's Date: 2019       Admit Date: 2019    Visit Dx:     ICD-10-CM ICD-9-CM   1. Chest pain, unspecified type R07.9 786.50   2. Elevated troponin R74.8 790.6   3. JUAN (acute kidney injury) (CMS/HCC) N17.9 584.9   4. History of CHF (congestive heart failure) Z86.79 V12.59   5. Essential hypertension I10 401.9   6. Type 2 diabetes mellitus with hyperglycemia, unspecified whether long term insulin use (CMS/HCC) E11.65 250.00   7. History of coronary artery disease Z86.79 V12.59     Patient Active Problem List   Diagnosis   • Coronary arteriosclerosis in native artery   • Lumbar radiculopathy   • Hypercholesterolemia   • Diabetic polyneuropathy associated with type 2 diabetes mellitus (CMS/HCC)   • Migraine with aura and with status migrainosus   • Palpitations   • Seizure disorder (CMS/HCC)   • GERD (gastroesophageal reflux disease)   • Brachial neuritis   • Cervical radiculopathy   • LOM (loss of memory)   • Anxiety   • Diabetes mellitus (CMS/HCC)   • Lower back pain   • Hypertension   • History of CVA (cerebrovascular accident)   • Non morbid obesity due to excess calories   • Post-infarction pericarditis (CMS/HCC)   • HCAP (healthcare-associated pneumonia)   • CVA (S/P tPA)   • Acute bronchitis   • Type 2 diabetes mellitus (CMS/HCC)   • Headache syndrome, complicated   • Chronic intractable headache   • Obstructive sleep apnea syndrome   • Acute exacerbation of asthma with allergic rhinitis   • Asthma   • Thrush   • Acute diastolic CHF (congestive heart failure) (CMS/HCC)   • Chest pain   • Chronic diastolic CHF (congestive heart failure) (CMS/HCC)   • COPD (chronic obstructive pulmonary disease) (CMS/HCC)   • JUAN (acute kidney injury) (CMS/HCC)   • Acute sinusitis   • Acute metabolic encephalopathy     Past Medical History:   Diagnosis Date   • Anxiety    • Arthritis    • Asthma    • BiPAP (biphasic positive  airway pressure) dependence    • Brachial neuritis 1/19/2017   • Chest pain    • CHF (congestive heart failure) (CMS/[facility])    • COPD (chronic obstructive pulmonary disease) (CMS/[facility])    • Coronary artery disease    • Depression    • Diabetes mellitus (CMS/[facility])    • Dizziness    • Edema    • GERD (gastroesophageal reflux disease)    • H/O chest x-ray 07/04/2015    Mild Left base atelectasis   • H/O echocardiogram 07/05/2015    Normal LVSF. EF of 60-65%. Grade 1 diastolic dysfunction of the LV myocardium. No evidence of pericardial effusion   • H/O exercise stress test    • History of herniated intervertebral disc     History of left L5-S1 disc herniation   • Hypertension    • LOM (loss of memory) 1/19/2017   • Lower back pain     Right   • Measles    • Obstructive sleep apnea    • Palpitations    • Pericardial effusion    • Seizure disorder (CMS/[facility])    • Seizures (CMS/[facility])     FOCAL    • Shortness of breath 1/19/2017   • SOB (shortness of breath)    • Stroke (CMS/[facility])    • Wears glasses      Past Surgical History:   Procedure Laterality Date   • BACK SURGERY     • CARDIAC CATHETERIZATION     • CARDIAC CATHETERIZATION N/A 8/11/2017    Procedure: Coronary angiography;  Surgeon: Dallas Kim MD;  Location: [facility] CATH INVASIVE LOCATION;  Service:    • CARDIAC CATHETERIZATION N/A 8/11/2017    Procedure: Left Heart Cath;  Surgeon: Dallas Kim MD;  Location: [facility] CATH INVASIVE LOCATION;  Service:    • CARDIAC CATHETERIZATION N/A 8/11/2017    Procedure: Left ventriculography;  Surgeon: Dallas Kim MD;  Location: [facility] CATH INVASIVE LOCATION;  Service:    • CARDIOVASCULAR STRESS TEST  07/03/2017    WITH DR KIM AT [facility]   • CARPAL TUNNEL RELEASE Right    • CATARACT EXTRACTION W/ INTRAOCULAR LENS IMPLANT Right 6/12/2017    Procedure: CATARACT PHACO EXTRACTION WITH INTRAOCULAR LENS IMPLANT RIGHT ;  Surgeon: Natalie Cao MD;  Location: [facility];  Service:    • CATARACT EXTRACTION W/ INTRAOCULAR LENS  IMPLANT Left 7/10/2017    Procedure: CATARACT PHACO EXTRACTION WITH INTRAOCULAR LENS IMPLANT LEFT;  Surgeon: Natalie Cao MD;  Location:  [facility];  Service:    • CHOLECYSTECTOMY     • CHOLECYSTECTOMY     • COLONOSCOPY     • CORONARY ANGIOPLASTY WITH STENT PLACEMENT     • CORONARY ANGIOPLASTY WITH STENT PLACEMENT      X1-LAD   • CORONARY ARTERY BYPASS GRAFT N/A 8/15/2017    Procedure: CORONARY ARTERY BYPASS GRAFTING x 3 UTILIZING THE LEFT INTERNAL MAMMARY ARTERY WITH ENDOSCOPIC VEIN HARVESTING OF THE RIGHT GREATER SAPHENOUS VEIN, HAMLET, LAD ENDARECTOMY;  Surgeon: London Damon MD;  Location:  [facility];  Service:    • ENDOSCOPY     • EYE SURGERY      cataract surgery both eyes   • KNEE ARTHROSCOPY Bilateral    • NEUROPLASTY / TRANSPOSITION ULNAR NERVE AT ELBOW     • OTHER SURGICAL HISTORY      Foraminotomy and discectomy   • PERICARDIAL WINDOW N/A 8/25/2017    Procedure: PERICARDIAL WINDOW;  Surgeon: Freeman Phillips MD;  Location:  [facility];  Service:      General Information     Row Name 09/16/19 1531          PT Evaluation Time/Intention    Document Type  evaluation  -EJ     Mode of Treatment  physical therapy  -EJ     Row Name 09/16/19 1531          General Information    Patient Profile Reviewed?  yes  -EJ     Prior Level of Function  independent:;all household mobility;community mobility;ADL's  -EJ     Existing Precautions/Restrictions  -- SOB  -EJ     Barriers to Rehab  none identified  -EJ     Row Name 09/16/19 1531          Relationship/Environment    Lives With  spouse  -EJ     Row Name 09/16/19 1531          Resource/Environmental Concerns    Current Living Arrangements  home/apartment/condo  -EJ     Row Name 09/16/19 1531          Home Main Entrance    Number of Stairs, Main Entrance  other (see comments) has a ramp  -EJ     Row Name 09/16/19 1531          Cognitive Assessment/Intervention- PT/OT    Orientation Status (Cognition)  oriented x 4  -EJ     Row Name 09/16/19 1531          Safety Issues,  Functional Mobility    Safety Issues Affecting Function (Mobility)  insight into deficits/self awareness;safety precaution awareness;safety precautions follow-through/compliance  -EJ     Impairments Affecting Function (Mobility)  endurance/activity tolerance;shortness of breath  -EJ       User Key  (r) = Recorded By, (t) = Taken By, (c) = Cosigned By    Initials Name Provider Type    EJ Leeanna Almanza PT Physical Therapist        Mobility     Row Name 09/16/19 1533          Bed Mobility Assessment/Treatment    Bed Mobility Assessment/Treatment  supine-sit  -EJ     Supine-Sit [place] (Bed Mobility)  conditional independence  -EJ     Assistive Device (Bed Mobility)  head of bed elevated  -EJ     Row Name 09/16/19 1533          Sit-Stand Transfer    Sit-Stand [place] (Transfers)  supervision  -EJ     Assistive Device (Sit-Stand Transfers)  walker, 4-wheeled  -EJ     Row Name 09/16/19 1533          Gait/Stairs Assessment/Training    [place] Level (Gait)  contact guard  -EJ     Assistive Device (Gait)  walker, front-wheeled  -EJ     Distance in Feet (Gait)  350  -EJ     Comment (Gait/Stairs)  Pt ambulats with step through gait pattern using Rollator. Pt slowly slumped forward with fatigue and became SOB. Upon Return to room SpO2 at 96  -EJ       User Key  (r) = Recorded By, (t) = Taken By, (c) = Cosigned By    Initials Name Provider Type    Leeanna Sorensen PT Physical Therapist        Obj/Interventions     Row Name 09/16/19 1535          General ROM    GENERAL ROM COMMENTS  WNL BLE  -EJ     Row Name 09/16/19 1535          MMT (Manual Muscle Testing)    General MMT Comments  5/5  -EJ     Row Name 09/16/19 1535          Static Sitting Balance    Level of [place] (Unsupported Sitting, Static Balance)  independent  -EJ     Row Name 09/16/19 1535          Static Standing Balance    Level of [place] (Supported Standing, Static Balance)  standby assist  -EJ       User Key  (r) = Recorded By,  (t) = Taken By, (c) = Cosigned By    Initials Name Provider Type    Leeanna Sorensen PT Physical Therapist        Goals/Plan     [facility] Name 09/16/19 1539          Bed Mobility Goal 1 (PT)    Activity/Assistive Device (Bed Mobility Goal 1, PT)  bed mobility activities, all  -EJ     [place] Level/Cues Needed (Bed Mobility Goal 1, PT)  independent  -EJ     Time Frame (Bed Mobility Goal 1, PT)  3 days;short term goal (STG)  -EJ     Row Name 09/16/19 1539          Transfer Goal 1 (PT)    Activity/Assistive Device (Transfer Goal 1, PT)  sit-to-stand/stand-to-sit  -EJ     [place] Level/Cues Needed (Transfer Goal 1, PT)  independent  -EJ     Time Frame (Transfer Goal 1, PT)  3 days  -EJ     Row Name 09/16/19 1539          Gait Training Goal 1 (PT)    Activity/Assistive Device (Gait Training Goal 1, PT)  gait (walking locomotion)  -EJ     [place] Level (Gait Training Goal 1, PT)  conditional independence  -EJ     Distance (Gait Goal 1, PT)  350, SpO2 >90%, with good posture/ no rest breaks.  -EJ     Time Frame (Gait Training Goal 1, PT)  3 days  -EJ       User Key  (r) = Recorded By, (t) = Taken By, (c) = Cosigned By    Initials Name Provider Type    Leeanna Sorensen PT Physical Therapist        Clinical Impression     Row Name 09/16/19 1535          Pain Assessment    Additional Documentation  Pain Scale: FACES Pre/Post-Treatment (Group)  -EJ     Row Name 09/16/19 1535          Pain Scale: Numbers Pre/Post-Treatment    Pain Scale: Numbers, Pretreatment  3/10  -     Pain Scale: Numbers, Post-Treatment  3/10  -EJ     Pain Location  -- chest/back. Reported shoulder pain prior in day during stress test  -     Pain Intervention(s)  Repositioned;Ambulation/increased activity  -[facility] Name 09/16/19 1535          Plan of Care Review    Plan of Care Reviewed With  patient;spouse  -EJ     Row Name 09/16/19 1535          Physical Therapy Clinical Impression    Criteria for Skilled Interventions Met (PT  Clinical Impression)  yes;treatment indicated  -EJ     Rehab Potential (PT Clinical Summary)  good, to achieve stated therapy goals  -     Row Name 09/16/19 1535          Vital Signs    Pretreatment Heart Rate (beats/min)  93  -EJ     Intratreatment Heart Rate (beats/min)  105  -EJ     Posttreatment Heart Rate (beats/min)  97  -EJ     Pre SpO2 (%)  93  -EJ     O2 Delivery Pre Treatment  room air  -EJ     Intra SpO2 (%)  95  -EJ     O2 Delivery Intra Treatment  room air  -EJ     Post SpO2 (%)  96  -EJ     O2 Delivery Post Treatment  room air  -EJ     Pre Patient Position  Supine  -EJ     Intra Patient Position  Standing  -EJ     Post Patient Position  Sitting  -EJ     Row Name 09/16/19 1535          Positioning and Restraints    Pre-Treatment Position  in bed  -EJ     Post Treatment Position  bed  -EJ     In Bed  with OT;sitting EOB;with family/caregiver  -       User Key  (r) = Recorded By, (t) = Taken By, (c) = Cosigned By    Initials Name Provider Type     Leeanna Almanza PT Physical Therapist        Outcome Measures    No documentation.       Physical Therapy Education     Title: PT OT SLP Therapies (In Progress)     Topic: Physical Therapy (In Progress)     Point: Mobility training (Done)     Learning Progress Summary           Patient Acceptance, E,TB, VU,NR by  at 9/16/2019  3:41 PM    Comment:  PLB                   Point: Precautions (Done)     Learning Progress Summary           Patient Acceptance, E,TB, VU,NR by  at 9/16/2019  3:41 PM    Comment:  PLB                               User Key     Initials Effective Dates Name Provider Type [facility] 11/20/18 -  Leeanna Almanza PT Physical Therapist PT              PT Recommendation and Plan  Planned Therapy Interventions (PT Eval): balance training, bed mobility training, gait training, home exercise program, stair training, strengthening, stretching, postural re-education, transfer training  Outcome Summary/Treatment Plan  (PT)  Anticipated Discharge Disposition (PT): home with OP services(pulmonary focus or for general deconditioning)  Plan of Care Reviewed With: patient  Progress: improving  Outcome Summary: Pt ambulates with rollator, CGA, x 350 ft with worsening posture and dyspnea toward end of ambulation. PT to continue during stay to focus on endurance. D/C home with assist and OPPT with pulmonary focus.     Time Calculation:   PT Charges     Row Name 09/16/19 1544             Time Calculation    Start Time  1333  -EJ      PT Received On  09/16/19  -      PT Goal Re-Cert Due Date  09/26/19  -         Time Calculation- PT    Total Timed Code Minutes- PT  0 minute(s)  -EJ        User Key  (r) = Recorded By, (t) = Taken By, (c) = Cosigned By    Initials Name Provider Type    Leeanna Sorensen PT Physical Therapist        Therapy Charges for Today     Code Description Service Date Service Provider Modifiers Qty    15540278659 HC PT EVAL LOW COMPLEXITY 4 9/16/2019 Leeanna Almanza PT GP 1          PT G-Codes  Outcome Measure Options: AM-PAC 6 Clicks Daily Activity (OT)  AM-PAC 6 Clicks Score (PT): 24  AM-PAC 6 Clicks Score (OT): 20    Leeanna Stinson PT  9/16/2019

## 2019-09-16 NOTE — PROGRESS NOTES
"    [facility] Medicine Services  PROGRESS NOTE    Patient Name: Denzel Harding  : 1960  MRN: [id number]    Date of Admission: 2019  Primary Care Physician: Ottoniel Toledo MD    Subjective   Subjective     CC:  F/U Chest Pain    HPI:  Pt is seen resting back in bed in NAD.  Just back from cardiac stress/MPS.  Awaiting results. States he is still have bilateral neck and mid chest pain.  Rates neck pain constant 2/10, chest pain is \"band like\" around mid chest and rates at 4/10 constant.  States \"I don't feel very good today\".  Mild intermittent nausea.  No vomiting or diarrhea.        Review of Systems  Gen- No fevers, chills  CV- +chest pain, No palpitations  Resp- No cough, +dyspnea   GI- mild intermittent nausea No V/D, No abd pain    All other systems reviewed and negative except as mentioned per HPI.     Objective   Objective     Vital Signs:   Temp:  [97.8 °F (36.6 °C)-98.8 °F (37.1 °C)] 98.4 °F (36.9 °C)  Heart Rate:  [] 97  Resp:  [16-20] 20  BP: (131-148)/(70-84) 148/84        Physical Exam:  Constitutional: No acute distress, awake, alert.  Resting up in bed. No visitors at bs  HENT: NCAT, mucous membranes moist  Respiratory: Clear to auscultation bilaterally A/P but decreased at bases, respiratory effort normal on RA  Cardiovascular: RRR, no murmurs, rubs, or gallops, palpable pedal pulses bilaterally  Gastrointestinal: Positive bowel sounds, soft, nontender, nondistended. Obese  Musculoskeletal: Bilat LE trace edema. GUNN spont   Psychiatric: Appropriate affect, cooperative and calm  Neurologic: Oriented x 3, strength symmetric in all extremities, Cranial Nerves grossly intact to confrontation, speech clear and appropriate.  Follows commands   Skin: No rashes. No diaphoresis.    Results Reviewed:    Results from last 7 days   Lab Units 19  0356 09/15/19  0521 19  1744   WBC 10*3/mm3 5.35 6.80 7.81   HEMOGLOBIN g/dL 13.1 12.9* 13.5   HEMATOCRIT % 40.7 " 40.4 41.3   PLATELETS 10*3/mm3 161 174 196     Results from last 7 days   Lab Units 09/16/19  0356 09/15/19  0521 09/15/19  0048 09/14/19 1955 09/14/19  1744   SODIUM mmol/L 137 139  --   --  139   POTASSIUM mmol/L 4.4 3.4*  --   --  3.6   CHLORIDE mmol/L 98 96*  --   --  93*   CO2 mmol/L 27.0 30.0*  --   --  34.0*   BUN mg/dL 18 26*  --   --  26*   CREATININE mg/dL 0.87 1.18  --   --  1.46*   GLUCOSE mg/dL 295* 128*  --   --  227*   CALCIUM mg/dL 9.3 9.2  --   --  9.6   ALT (SGPT) U/L  --   --   --   --  55*   AST (SGOT) U/L  --   --   --   --  57*   TROPONIN T ng/mL  --   --  <0.010 0.028 0.036*   PROBNP pg/mL  --   --   --   --  41.0     Estimated Creatinine Clearance: 133.5 mL/min (by C-G formula based on SCr of 0.87 mg/dL).    Microbiology Results Abnormal     None          Imaging Results (last 24 hours)     ** No results found for the last 24 hours. **          Results for orders placed during the hospital encounter of 05/22/19   Adult Transthoracic Echo Complete W/ Cont if Necessary Per Protocol    Narrative Technically very limited study   1) Mild LVH with normal LV systolic function ( EF is over 55%)  2) Mild left atrial enlargement   3) Trace MR and TR   4) Normal RV function        I have reviewed the medications:  Scheduled Meds:    amitriptyline 75 mg Oral Nightly   amLODIPine 2.5 mg Oral Daily   aspirin 325 mg Oral Daily   atorvastatin 80 mg Oral Nightly   budesonide-formoterol 2 puff Inhalation BID - RT   cetirizine 10 mg Oral Daily   gabapentin 300 mg Oral Q12H   heparin (porcine) 5,000 Units Subcutaneous Q12H   insulin lispro 0-7 Units Subcutaneous 4x Daily With Meals & Nightly   pantoprazole 40 mg Oral Nightly   ranolazine 500 mg Oral BID   sodium chloride 10 mL Intravenous Q12H   zafirlukast 20 mg Oral BID     Continuous Infusions:   PRN Meds:.•  acetaminophen **OR** acetaminophen **OR** acetaminophen  •  albuterol  •  dextrose  •  dextrose  •  glucagon (human recombinant)  •  meclizine  •   melatonin  •  ondansetron  •  potassium chloride  •  potassium chloride  •  sodium chloride  •  sodium chloride      Assessment/Plan   Assessment / Plan     Active Hospital Problems    Diagnosis  POA   • **Chest pain [R07.9]  Yes   • Chronic diastolic CHF (congestive heart failure) (CMS/[facility]) [I50.32]  Yes   • COPD (chronic obstructive pulmonary disease) (CMS/[facility]) [J44.9]  Yes   • JUAN (acute kidney injury) (CMS/HCC) [N17.9]  Yes   • Acute sinusitis [J01.90]  Yes   • Acute metabolic encephalopathy [G93.41]  Yes   • Obstructive sleep apnea syndrome [G47.33]  Yes   • History of CVA (cerebrovascular accident) [Z86.73]  Not Applicable   • Hypertension [I10]  Yes   • GERD (gastroesophageal reflux disease) [K21.9]  Yes   • Seizure disorder (CMS/HCC) [G40.909]  Yes   • Diabetic polyneuropathy associated with type 2 diabetes mellitus (CMS/[facility]) [E11.42]  Yes      Resolved Hospital Problems   No resolved problems to display.        Brief Hospital Course to date:  Denzel Harding is a 59 y.o. male with a past medical history significant for COPD, CHF, HTN, HLD, DM2, and EDD with BiPAP admitted with chest pain, dyspnea, and AMS. Patient followed by Dr. Jiménez. Recently had Laisx switched to Bumex and metolazone. Has lost 25 pounds of fluid in 4 days. Creatinine has doubled over the past week, but improved since 9/9. Suspect overdiuresis.        Assessment/Plan:    1. Chest pain/HUTCHINS/CHF  --pt states still have mid chest band like pain/pressure at all times today, and pain in bilateral neck   -troponin's trended: initially negative, then mild elevation at 0.036 then trended down to <0.010  -No acute EKG changes  -Dr. Jiménez consulted and following, appreciate recommendations  --pt went for Stress Test/MPS this am.  Awaiting cards recs  -Continue Telemetry      2. JUAN:  - Resolved  - Will continue to hold Bumex, ACEi.  Defer to cards   -Gabapentin dose decreased  -lactic acid initially elevated, now resolved s/p IVF  -IVF have been  discontinued     3. Sinusitis per CT scan:  - chronic. On flonase and cycloset. Pt denies any sinus tenderness or congestion     4. COPD:  - continue scheduled duo nebs and Symbicort  - history of tobacco abuse  - not on supplemental oxygen     5. DM2: uncontrolled (A1c 8.8)  - hold oral hypoglycemics  - scheduled accu checks  -LDSSI  --will schedule low dose basal and low dose mealtime insulin today with hold parameters.  Taking PO diet for now per cards orders.      6. EDD:  - on BiPAP night. Continue as inpatient     7. Confusion on admission  -CT head negative  -likely secondary to dehydration, ARF, polypharmacy  -gabapentin dose decreased  -UDS +TCA's   -will continue to monitor, confusion has resolved at this time    8. Hypokalemia  -replace per protocol      DVT prophylaxis:  SQH    Disposition: I expect the patient to be discharged home once medically stable    CODE STATUS:   Code Status and Medical Interventions:   Ordered at: 09/14/19 8760     Level Of Support Discussed With:    Patient     Code Status:    CPR     Medical Interventions (Level of Support Prior to Arrest):    Full         Electronically signed by ANTONIO Traylor, 09/16/19, 1:55 PM.

## 2019-09-16 NOTE — PLAN OF CARE
Problem: Patient Care Overview  Goal: Plan of Care Review  Outcome: Ongoing (interventions implemented as appropriate)   09/16/19 0520   Coping/Psychosocial   Plan of Care Reviewed With patient   OTHER   Outcome Summary VSS. No acute issues noted overnight. No discomfort/pain reported. NSR on monitor. Plan for stress test today. NPO since 0000. Continuing to monitor.

## 2019-09-16 NOTE — PLAN OF CARE
Problem: Patient Care Overview  Goal: Plan of Care Review  Outcome: Ongoing (interventions implemented as appropriate)   09/16/19 8634   Coping/Psychosocial   Plan of Care Reviewed With patient   OTHER   Outcome Summary Pt ambulates with rollator, CGA, x 350 ft with worsening posture and dyspnea toward end of ambulation. PT to continue during stay to focus on endurance. D/C home with assist and OPPT with pulmonary focus.   Plan of Care Review   Progress improving

## 2019-09-16 NOTE — THERAPY EVALUATION
Acute Care - Occupational Therapy Initial Evaluation  [facility]     Patient Name: Denzel Harding  : 1960  MRN: [id number]  Today's Date: 2019  Onset of Illness/Injury or Date of Surgery: 19  Date of Referral to OT: 09/15/19       Admit Date: 2019       ICD-10-CM ICD-9-CM   1. Chest pain, unspecified type R07.9 786.50   2. Elevated troponin R74.8 790.6   3. JUAN (acute kidney injury) (CMS/[facility]) N17.9 584.9   4. History of CHF (congestive heart failure) Z86.79 V12.59   5. Essential hypertension I10 401.9   6. Type 2 diabetes mellitus with hyperglycemia, unspecified whether long term insulin use (CMS/HCC) E11.65 250.00   7. History of coronary artery disease Z86.79 V12.59     Patient Active Problem List   Diagnosis   • Coronary arteriosclerosis in native artery   • Lumbar radiculopathy   • Hypercholesterolemia   • Diabetic polyneuropathy associated with type 2 diabetes mellitus (CMS/HCC)   • Migraine with aura and with status migrainosus   • Palpitations   • Seizure disorder (CMS/HCC)   • GERD (gastroesophageal reflux disease)   • Brachial neuritis   • Cervical radiculopathy   • LOM (loss of memory)   • Anxiety   • Diabetes mellitus (CMS/HCC)   • Lower back pain   • Hypertension   • History of CVA (cerebrovascular accident)   • Non morbid obesity due to excess calories   • Post-infarction pericarditis (CMS/HCC)   • HCAP (healthcare-associated pneumonia)   • CVA (S/P tPA)   • Acute bronchitis   • Type 2 diabetes mellitus (CMS/HCC)   • Headache syndrome, complicated   • Chronic intractable headache   • Obstructive sleep apnea syndrome   • Acute exacerbation of asthma with allergic rhinitis   • Asthma   • Thrush   • Acute diastolic CHF (congestive heart failure) (CMS/HCC)   • Chest pain   • Chronic diastolic CHF (congestive heart failure) (CMS/HCC)   • COPD (chronic obstructive pulmonary disease) (CMS/HCC)   • JUAN (acute kidney injury) (CMS/HCC)   • Acute sinusitis   • Acute metabolic  encephalopathy     Past Medical History:   Diagnosis Date   • Anxiety    • Arthritis    • Asthma    • BiPAP (biphasic positive airway pressure) dependence    • Brachial neuritis 1/19/2017   • Chest pain    • CHF (congestive heart failure) (CMS/[facility])    • COPD (chronic obstructive pulmonary disease) (CMS/[facility])    • Coronary artery disease    • Depression    • Diabetes mellitus (CMS/[facility])    • Dizziness    • Edema    • GERD (gastroesophageal reflux disease)    • H/O chest x-ray 07/04/2015    Mild Left base atelectasis   • H/O echocardiogram 07/05/2015    Normal LVSF. EF of 60-65%. Grade 1 diastolic dysfunction of the LV myocardium. No evidence of pericardial effusion   • H/O exercise stress test    • History of herniated intervertebral disc     History of left L5-S1 disc herniation   • Hypertension    • LOM (loss of memory) 1/19/2017   • Lower back pain     Right   • Measles    • Obstructive sleep apnea    • Palpitations    • Pericardial effusion    • Seizure disorder (CMS/[facility])    • Seizures (CMS/[facility])     FOCAL    • Shortness of breath 1/19/2017   • SOB (shortness of breath)    • Stroke (CMS/[facility])    • Wears glasses      Past Surgical History:   Procedure Laterality Date   • BACK SURGERY     • CARDIAC CATHETERIZATION     • CARDIAC CATHETERIZATION N/A 8/11/2017    Procedure: Coronary angiography;  Surgeon: Dallas Kim MD;  Location: [facility] INVASIVE LOCATION;  Service:    • CARDIAC CATHETERIZATION N/A 8/11/2017    Procedure: Left Heart Cath;  Surgeon: Dallas Kim MD;  Location: [facility] INVASIVE LOCATION;  Service:    • CARDIAC CATHETERIZATION N/A 8/11/2017    Procedure: Left ventriculography;  Surgeon: Dallas Kim MD;  Location: [facility] INVASIVE LOCATION;  Service:    • CARDIOVASCULAR STRESS TEST  07/03/2017    WITH DR KIM AT [facility]   • CARPAL TUNNEL RELEASE Right    • CATARACT EXTRACTION W/ INTRAOCULAR LENS IMPLANT Right 6/12/2017    Procedure: CATARACT PHACO EXTRACTION WITH INTRAOCULAR  LENS IMPLANT RIGHT ;  Surgeon: Natalie Cao MD;  Location:  [facility];  Service:    • CATARACT EXTRACTION W/ INTRAOCULAR LENS IMPLANT Left 7/10/2017    Procedure: CATARACT PHACO EXTRACTION WITH INTRAOCULAR LENS IMPLANT LEFT;  Surgeon: Natalie Cao MD;  Location:  [facility];  Service:    • CHOLECYSTECTOMY     • CHOLECYSTECTOMY     • COLONOSCOPY     • CORONARY ANGIOPLASTY WITH STENT PLACEMENT     • CORONARY ANGIOPLASTY WITH STENT PLACEMENT      X1-LAD   • CORONARY ARTERY BYPASS GRAFT N/A 8/15/2017    Procedure: CORONARY ARTERY BYPASS GRAFTING x 3 UTILIZING THE LEFT INTERNAL MAMMARY ARTERY WITH ENDOSCOPIC VEIN HARVESTING OF THE RIGHT GREATER SAPHENOUS VEIN, HAMLET, LAD ENDARECTOMY;  Surgeon: London Damon MD;  Location:  [facility];  Service:    • ENDOSCOPY     • EYE SURGERY      cataract surgery both eyes   • KNEE ARTHROSCOPY Bilateral    • NEUROPLASTY / TRANSPOSITION ULNAR NERVE AT ELBOW     • OTHER SURGICAL HISTORY      Foraminotomy and discectomy   • PERICARDIAL WINDOW N/A 8/25/2017    Procedure: PERICARDIAL WINDOW;  Surgeon: Freeman Phillips MD;  Location:  [facility];  Service:           OT ASSESSMENT FLOWSHEET (last 12 hours)      Occupational Therapy Evaluation     Row Name 09/16/19 1200                   OT Evaluation Time/Intention    Subjective Information  complains of;weakness;dyspnea  -KF        Document Type  evaluation  -KF        Mode of Treatment  occupational therapy  -KF        Patient Effort  good  -KF        Symptoms Noted During/After Treatment  fatigue;shortness of breath  -KF           General Information    Patient Profile Reviewed?  yes  -KF        Onset of Illness/Injury or Date of Surgery  09/14/19  -KF        Patient Observations  alert;cooperative;agree to therapy  -KF        Patient/Family Observations  family present  -KF        General Observations of Patient  sitting EOB post funct mob with PT  -KF        Prior Level of Function  independent:;community mobility;transfer;all household  mobility;ADL's;bed mobility  -KF        Equipment Currently Used at Home  none  -KF        Pertinent History of Current Functional Problem  Pt 59 yo male hx of CHF, COPD, HTN presents with chest pain, AMS, and generalized weakness  -KF        Existing Precautions/Restrictions  fall  -KF        Risks Reviewed  patient and family:;LOB;nausea/vomiting;dizziness;increased discomfort;change in vital signs  -KF        Benefits Reviewed  patient and family:;improve function;increase independence;increase strength;increase knowledge  -KF        Barriers to Rehab  none identified  -KF           Relationship/Environment    Lives With  spouse  -KF        Family Caregiver if Needed  spouse  -KF           Resource/Environmental Concerns    Current Living Arrangements  home/apartment/condo  -KF           Cognitive Assessment/Interventions    Additional Documentation  Cognitive Assessment/Intervention (Group)  -KF           Cognitive Assessment/Intervention- PT/OT    Affect/Mental Status (Cognitive)  WFL  -KF        Orientation Status (Cognition)  oriented x 4  -KF        Follows Commands (Cognition)  WFL;follows one step commands;over 90% accuracy  -KF        Cognitive Function (Cognitive)  safety deficit  -KF        Safety Deficit (Cognitive)  mild deficit;judgment;insight into deficits/self awareness;awareness of need for assistance  -KF        Cognitive Interventions (Cognitive)  occupation/activity based interventions;process/task specific training  -KF           Safety Issues, Functional Mobility    Safety Issues Affecting Function (Mobility)  problem solving;judgment;insight into deficits/self awareness  -KF        Impairments Affecting Function (Mobility)  balance;endurance/activity tolerance  -KF           Bed Mobility Assessment/Treatment    Comment (Bed Mobility)  sitting EOB upon arrival   -KF           Functional Mobility    Functional Mobility- Comment  deferred to PT  -KF           Transfer Assessment/Treatment     Transfer Assessment/Treatment  sit-stand transfer;stand-sit transfer;bed-chair transfer  -KF        Comment (Transfers)  cues for rollator placement   -KF           Bed-Chair Transfer    Bed-Chair [place] (Transfers)  stand by assist;verbal cues  -KF        Assistive Device (Bed-Chair Transfers)  walker, 4-wheeled  -KF           Sit-Stand Transfer    Sit-Stand [place] (Transfers)  stand by assist  -KF        Assistive Device (Sit-Stand Transfers)  walker, 4-wheeled  -KF           Stand-Sit Transfer    Stand-Sit [place] (Transfers)  stand by assist;verbal cues  -KF        Assistive Device (Stand-Sit Transfers)  walker, 4-wheeled  -KF           ADL Assessment/Intervention    BADL Assessment/Intervention  lower body dressing  -KF           Lower Body Dressing Assessment/Training    Lower Body Dressing [place] Level  don;doff;socks;supervision  -KF        Lower Body Dressing Position  edge of bed sitting  -KF           BADL Safety/Performance    Impairments, BADL Safety/Performance  endurance/activity tolerance;shortness of breath  -KF        Skilled BADL Treatment/Intervention  BADL process/adaptation training;cognitive/safety deficit modifications  -KF           General ROM    GENERAL ROM COMMENTS  BUE WFL  -KF           MMT (Manual Muscle Testing)    General MMT Comments  BUE grossly 4+/5  -KF           Motor Assessment/Interventions    Additional Documentation  Balance (Group);Balance Interventions (Group);Gross Motor Coordination (Group)  -KF           Gross Motor Coordination    Gross Motor Impairments  AROM (active range of motion)  -KF        Gross Motor Skill, Impairments Detail  BUE WFL  -KF           Balance    Balance  static sitting balance;static standing balance;dynamic sitting balance;dynamic standing balance  -KF           Static Sitting Balance    Level of [place] (Unsupported Sitting, Static Balance)  independent  -KF        Sitting Position (Unsupported Sitting, Static  Balance)  sitting on edge of bed;sitting in chair  -KF           Dynamic Sitting Balance    Level of [place], Reaches Outside Midline (Sitting, Dynamic Balance)  supervision  -KF        Sitting Position, Reaches Outside Midline (Sitting, Dynamic Balance)  sitting on edge of bed  -KF           Static Standing Balance    Level of [place] (Supported Standing, Static Balance)  standby assist  -KF        Time Able to Maintain Position (Supported Standing, Static Balance)  1 to 2 minutes  -KF        Assistive Device Utilized (Supported Standing, Static Balance)  other (see comments) rollator  -KF           Dynamic Standing Balance    Level of [place], Reaches Outside Midline (Standing, Dynamic Balance)  contact guard assist  -KF        Time Able to Maintain Position, Reaches Outside Midline (Standing, Dynamic Balance)  2 to 3 minutes  -KF        Assistive Device Utilized (Supported Standing, Dynamic Balance)  other (see comments) rollator  -KF           Sensory Assessment/Intervention    Sensory General Assessment  no sensation deficits identified  -KF           Positioning and Restraints    Pre-Treatment Position  in bed  -KF        Post Treatment Position  chair  -KF        In Chair  notified nsg;reclined;sitting;call light within reach;encouraged to call for assist;with family/caregiver;legs elevated  -KF           Pain Assessment    Additional Documentation  Pain Scale: Numbers Pre/Post-Treatment (Group)  -KF           Pain Scale: Numbers Pre/Post-Treatment    Pain Scale: Numbers, Pretreatment  3/10  -KF        Pain Scale: Numbers, Post-Treatment  3/10  -KF        Pain Location - Orientation  generalized  -KF        Pain Location  chest  -KF        Pain Intervention(s)  Repositioned;Ambulation/increased activity  -KF           Plan of Care Review    Plan of Care Reviewed With  patient  -KF           Clinical Impression (OT)    Date of Referral to OT  09/15/19  -KF        OT Diagnosis  ADL decline  -KF         Patient/Family Goals Statement (OT Eval)  PLOF  -KF        Criteria for Skilled Therapeutic Interventions Met (OT Eval)  yes;treatment indicated  -KF        Rehab Potential (OT Eval)  good, to achieve stated therapy goals  -KF        Therapy Frequency (OT Eval)  daily  -KF        Care Plan Review (OT)  evaluation/treatment results reviewed;care plan/treatment goals reviewed;risks/benefits reviewed;current/potential barriers reviewed;patient/other agree to care plan  -KF        Care Plan Review, Other Participant (OT Eval)  family  -KF        Anticipated Discharge Disposition (OT)  home with assist  -KF           Vital Signs    Pre Systolic BP Rehab  178  -KF        Pre Treatment Diastolic BP  88 Rn notified   -KF        Pretreatment Heart Rate (beats/min)  105  -KF        Posttreatment Heart Rate (beats/min)  91  -KF        Pre SpO2 (%)  93  -KF        O2 Delivery Pre Treatment  room air  -KF        Post SpO2 (%)  95  -KF        O2 Delivery Post Treatment  room air  -KF        Pre Patient Position  Sitting  -KF        Intra Patient Position  Standing  -KF        Post Patient Position  Sitting  -KF        Rest Breaks   1  -KF           Planned OT Interventions    Planned Therapy Interventions (OT Eval)  activity tolerance training;adaptive equipment training;BADL retraining;cognitive/visual perception retraining;functional balance retraining;IADL retraining;neuromuscular control/coordination retraining;occupation/activity based interventions;patient/caregiver education/training;ROM/therapeutic exercise;strengthening exercise;transfer/mobility retraining  -KF           OT Goals    Transfer Goal Selection (OT)  transfer, OT goal 1  -KF        Dressing Goal Selection (OT)  dressing, OT goal 1  -KF        Activity Tolerance Goal Selection (OT)  activity tolerance, OT goal 1  -KF        Additional Documentation  Activity Tolerance Goal Selection (OT) (Row)  -KF           Transfer Goal 1 (OT)    Activity/Assistive  Device (Transfer Goal 1, OT)  bed-to-chair/chair-to-bed;commode;rollator  -KF        White Level/Cues Needed (Transfer Goal 1, OT)  supervision required;verbal cues required  -KF        Time Frame (Transfer Goal 1, OT)  long term goal (LTG);by discharge  -KF        Progress/Outcome (Transfer Goal 1, OT)  goal ongoing  -KF           Dressing Goal 1 (OT)    Activity/Assistive Device (Dressing Goal 1, OT)  lower body dressing undergarment/pants; shoes AE PRN  -KF        White/Cues Needed (Dressing Goal 1, OT)  supervision required;set-up required;verbal cues required  -KF        Time Frame (Dressing Goal 1, OT)  long term goal (LTG);by discharge  -KF        Progress/Outcome (Dressing Goal 1, OT)  goal ongoing  -KF            Activity Tolerance Goal 1 (OT)    Activity Tolerance Goal 1 (OT)  Tolerate standing ADL tasks and TA with 1 seated rest break to improve ADL completion  -KF        Activity Level (Endurance Goal 1, OT)  10 min activity;O2 sat >/ equal to 88%  -KF        Time Frame (Activity Tolerance Goal 1, OT)  long term goal (LTG);by discharge  -KF        Progress/Outcome (Activity Tolerance Goal 1, OT)  goal ongoing  -KF           Living Environment    Home Accessibility  other (see comments) ramp, [place] shower  -KF          User Key  (r) = Recorded By, (t) = Taken By, (c) = Cosigned By    Initials Name Effective Dates    Felicia Fontaine, OT 04/03/18 -          Occupational Therapy Education     Title: PT OT SLP Therapies (In Progress)     Topic: Occupational Therapy (In Progress)     Point: ADL training (Done)     Description: Instruct learner(s) on proper safety adaptation and remediation techniques during self care or transfers.   Instruct in proper use of assistive devices.    Learning Progress Summary           Patient Acceptance, E,TB,D, DU,VU by KF at 9/16/2019  1:30 PM    Comment:  Purpose of OT services                   Point: Precautions (Done)     Description: Instruct learner(s) on  prescribed precautions during self-care and functional transfers.    Learning Progress Summary           Patient Acceptance, E,TB,D, DU,VU by KF at 9/16/2019  1:30 PM    Comment:  Purpose of OT services                   Point: Body mechanics (Done)     Description: Instruct learner(s) on proper positioning and spine alignment during self-care, functional mobility activities and/or exercises.    Learning Progress Summary           Patient Acceptance, E,TB,D, DU,VU by KF at 9/16/2019  1:30 PM    Comment:  Purpose of OT services                               User Key     Initials Effective Dates Name Provider Type Discipline     04/03/18 -  Felicia Gill, OT Occupational Therapist OT                  OT Recommendation and Plan  Outcome Summary/Treatment Plan (OT)  Anticipated Discharge Disposition (OT): home with assist  Planned Therapy Interventions (OT Eval): activity tolerance training, adaptive equipment training, BADL retraining, cognitive/visual perception retraining, functional balance retraining, IADL retraining, neuromuscular control/coordination retraining, occupation/activity based interventions, patient/caregiver education/training, ROM/therapeutic exercise, strengthening exercise, transfer/mobility retraining  Therapy Frequency (OT Eval): daily  Plan of Care Review  Plan of Care Reviewed With: patient  Plan of Care Reviewed With: patient  Outcome Summary: OT eval completed Pt presents with deficits in activity tolerance, AROM, and strength for ADL completion, set up LBD socks and SBA transfer bed to chair at rollator; recom IPOT d/c home with assist     Outcome Measures     Row Name 09/16/19 5189             How much help from another is currently needed...    Putting on and taking off regular lower body clothing?  3  -KF      Bathing (including washing, rinsing, and drying)  3  -KF      Toileting (which includes using toilet bed pan or urinal)  3  -KF      Putting on and taking off regular upper  body clothing  4  -KF      Taking care of personal grooming (such as brushing teeth)  3  -KF      Eating meals  4  -KF      AM-PAC 6 Clicks Score (OT)  20  -KF         Functional Assessment    Outcome Measure Options  AM-PAC 6 Clicks Daily Activity (OT)  -KF        User Key  (r) = Recorded By, (t) = Taken By, (c) = Cosigned By    Initials Name Provider Type    Felicia Fontaine OT Occupational Therapist          Time Calculation:   Time Calculation- OT     Row Name 09/16/19 1330             Time Calculation- OT    OT Start Time  1330  -KF      Total Timed Code Minutes- OT  0 minute(s)  -KF      OT Received On  09/16/19  -KF      OT Goal Re-Cert Due Date  09/26/19  -KF        User Key  (r) = Recorded By, (t) = Taken By, (c) = Cosigned By    Initials Name Provider Type    Felicia Fontaine OT Occupational Therapist        Therapy Charges for Today     Code Description Service Date Service Provider Modifiers Qty    [id number]  OT EVAL MOD COMPLEXITY 3 9/16/2019 Felicia Gill OT GO 1               Felicia Gill OT  9/16/2019

## 2019-09-16 NOTE — PLAN OF CARE
Problem: Patient Care Overview  Goal: Plan of Care Review  Outcome: Ongoing (interventions implemented as appropriate)   09/16/19 1413   Coping/Psychosocial   Plan of Care Reviewed With patient   OTHER   Outcome Summary OT eval completed Pt presents with deficits in activity tolerance, AROM, and strength for ADL completion, set up LBD socks and SBA transfer bed to chair at rollator; recom IPOT d/c home with assist    Plan of Care Review   Progress improving

## 2019-09-16 NOTE — PROGRESS NOTES
"[facility] Progress Note     LOS: 1 day   Patient Care Team:  Ottoniel Toledo MD as PCP - General (Family Medicine)  Ottoniel Toledo MD as Referring Physician (Family Medicine)  PCP:  Ottoniel Toledo MD    Chief Complaint: Chest pain    SUBJECTIVE: Sitting up in bed eating dinner.  Was able to ambulate around the hallway today with only mild limiting dyspnea on exertion.  Reports intermittent mild precordial chest discomfort.  Reviewed myocardial perfusion imaging.      Review of Systems:   All systems have been reviewed and are negative with the exception of those mentioned above.      OBJECTIVE:    Vital Sign Min/Max for last 24 hours  Temp  Min: 97.8 °F (36.6 °C)  Max: 98.4 °F (36.9 °C)   BP  Min: 131/75  Max: 148/84   Pulse  Min: 90  Max: 100   Resp  Min: 16  Max: 20   SpO2  Min: 93 %  Max: 93 %   No Data Recorded   Weight  Min: 146 kg (321 lb 14 oz)  Max: 146 kg (321 lb 14 oz)     Flowsheet Rows      First Filed Value   Admission Height  172.7 cm (68\") Documented at 09/14/2019 1724   Admission Weight  146 kg (321 lb)  (Abnormal)  Documented at 09/14/2019 1705              Intake/Output Summary (Last 24 hours) at 9/16/2019 1638  Last data filed at 9/16/2019 1125  Gross per 24 hour   Intake 360 ml   Output 4100 ml   Net -3740 ml     Intake & Output (last 3 days)       09/13 0701 - 09/14 0700 09/14 0701 - 09/15 0700 09/15 0701 - 09/16 0700 09/16 0701 - 09/17 0700    P.O.   720     IV Piggyback  500      Total Intake(mL/kg)  500 (3.4) 720 (4.9)     Urine (mL/kg/hr)   4500 (1.3) 300 (0.2)    Total Output   4500 300    Net  +500 -3780 -300                   Physical Exam:    General Appearance:    Alert, cooperative, no distress, appears stated age   Neck:   Supple, symmetrical, trachea midline.   Lungs:     Clear to auscultation bilaterally, respirations unlabored   Chest Wall:    No tenderness or deformity    Heart:    Regular rate and rhythm, S1 and S2 normal, no murmur, rub   " or gallop, normal carotid impulse bilaterally without bruit.   Extremities:   Extremities normal, atraumatic, no cyanosis or edema   Pulses:   2+ and symmetric all extremities   Skin:   Skin color, texture, turgor normal, no rashes or lesions      LABS/DIAGNOSTIC DATA:  Results from last 7 days   Lab Units 09/16/19  0356 09/15/19  0521 09/14/19  1744   WBC 10*3/mm3 5.35 6.80 7.81   HEMOGLOBIN g/dL 13.1 12.9* 13.5   HEMATOCRIT % 40.7 40.4 41.3   PLATELETS 10*3/mm3 161 174 196     Lab Results   Lab Value Date/Time    TROPONINT <0.010 09/15/2019 0048    TROPONINT 0.028 09/14/2019 1955    TROPONINT 0.036 (C) 09/14/2019 1744    TROPONINT <0.010 09/06/2019 1657    TROPONINT <0.010 09/06/2019 1439    TROPONINT <0.010 08/29/2019 1320    TROPONINT <0.010 08/17/2019 1551    TROPONINT <0.010 08/17/2019 1406         Results from last 7 days   Lab Units 09/16/19  0356 09/15/19  0521 09/14/19  1744   SODIUM mmol/L 137 139 139   POTASSIUM mmol/L 4.4 3.4* 3.6   CHLORIDE mmol/L 98 96* 93*   CO2 mmol/L 27.0 30.0* 34.0*   BUN mg/dL 18 26* 26*   CREATININE mg/dL 0.87 1.18 1.46*   CALCIUM mg/dL 9.3 9.2 9.6   BILIRUBIN mg/dL  --   --  0.7   ALK PHOS U/L  --   --  71   ALT (SGPT) U/L  --   --  55*   AST (SGOT) U/L  --   --  57*   GLUCOSE mg/dL 295* 128* 227*     Results from last 7 days   Lab Units 09/16/19 0356   HEMOGLOBIN A1C % 8.80*                   Medication Review:     amitriptyline 75 mg Oral Nightly   amLODIPine 2.5 mg Oral Daily   aspirin 325 mg Oral Daily   atorvastatin 80 mg Oral Nightly   budesonide-formoterol 2 puff Inhalation BID - RT   cetirizine 10 mg Oral Daily   gabapentin 300 mg Oral Q12H   heparin (porcine) 5,000 Units Subcutaneous Q12H   insulin detemir 6 Units Subcutaneous Nightly   insulin lispro 0-7 Units Subcutaneous 4x Daily With Meals & Nightly   insulin lispro 2 Units Subcutaneous TID With Meals   pantoprazole 40 mg Oral Nightly   ranolazine 500 mg Oral BID   sodium chloride 10 mL Intravenous Q12H    zafirlukast 20 mg Oral BID            ASSESSMENT/PLAN:    Chest pain    Diabetic polyneuropathy associated with type 2 diabetes mellitus (CMS/HCC)    Seizure disorder (CMS/HCC)    GERD (gastroesophageal reflux disease)    Hypertension    History of CVA (cerebrovascular accident)    Obstructive sleep apnea syndrome    Chronic diastolic CHF (congestive heart failure) (CMS/HCC)    COPD (chronic obstructive pulmonary disease) (CMS/HCC)    JUAN (acute kidney injury) (CMS/[facility])    Acute sinusitis    Acute metabolic encephalopathy        Atypical chest pain: Status post low risk cardiac evaluation to include a normal myocardial perfusion imaging.  Currently no clinical suspicion for cardiac etiology.  No further cardiac evaluation recommend at this time.    Congestive heart failure, chronic, diastolic: Currently euvolemic and compensated.  Renal insufficiency resolved with mild volume resuscitation.    Stable for discharge from a cardiac standpoint.  Would decrease Bumex to once daily dosing, otherwise continue previous cardiac medical therapy.  Will need BMP in 1 week and follow-up with me in 2 weeks.      London Jiménez III, MD   09/16/19  4:38 PM

## 2019-09-16 NOTE — DISCHARGE SUMMARY
[facility] Medicine Services  DISCHARGE SUMMARY    Patient Name: Denzel Harding  : 1960  MRN: [id number]    Date of Admission: 2019  Date of Discharge:  2019  Primary Care Physician: Ottoniel Toledo MD    Consults     Date and Time Order Name Status Description    2019 2330 Inpatient Cardiology Consult Completed           Hospital Course     Presenting Problem:   Chest pain [R07.9]  Chest pain [R07.9]    Active Hospital Problems    Diagnosis  POA   • Chronic diastolic CHF (congestive heart failure) (CMS/[facility]) [I50.32]  Yes   • COPD (chronic obstructive pulmonary disease) (CMS/[facility]) [J44.9]  Yes   • Acute sinusitis [J01.90]  Yes   • Obstructive sleep apnea syndrome [G47.33]  Yes   • History of CVA (cerebrovascular accident) [Z86.73]  Not Applicable   • Hypertension [I10]  Yes   • GERD (gastroesophageal reflux disease) [K21.9]  Yes   • Seizure disorder (CMS/[facility]) [G40.909]  Yes   • Diabetic polyneuropathy associated with type 2 diabetes mellitus (CMS/[facility]) [E11.42]  Yes      Resolved Hospital Problems    Diagnosis Date Resolved POA   • **Chest pain [R07.9] 2019 Yes   • JUAN (acute kidney injury) (CMS/[facility]) [N17.9] 2019 Yes   • Acute metabolic encephalopathy [G93.41] 2019 Yes          Hospital Course:  Denzel Harding is a 59 y.o. male with a past medical history significant for COPD, CHF, HTN, HLD, DM2, and EDD with BiPAP admitted with chest pain, dyspnea, and AMS. Patient followed by Dr. Jiménez. Recently had Laisx switched to Bumex and metolazone. Has lost 25 pounds of fluid in 4 days. Creatinine has doubled over the past week, but improved since . Suspect overdiuresis. Was admitted to hospitalist service and Dr Jiménez with cardiology was consulted.         Assessment/Plan:     1. Chest pain/HUTCHINS/CHF  --pt states still have mid chest band like pain/pressure at all times today, and pain in bilateral neck   -troponin's trended: initially negative, then mild  elevation at 0.036 then trended down to <0.010  -No acute EKG changes  -Dr. Jiménez consulted and following, appreciate recommendations  --pt went for Stress Test/MPS this am. Seen by Dr Jiménez post procedure this afternoon.  Per Cards, testing revealed a low risk cardiac evaluation to include a normal myocardial perfusion imaging.  Currently no clinical suspicion for cardiac etiology.  No further cardiac evaluation recommend at this time.       2. JUAN:  - Resolved  - Resuming bumex at once daily dosing on dc per Dr Jiménez recs   -lactic acid initially elevated, now resolved s/p IVF  -IVF have been discontinued  --f/u labs with [facility] within the week.       3. Sinusitis per CT scan:  - chronic. On flonase and cycloset. Pt denies any sinus tenderness or congestion     4. COPD:  - continue scheduled duo nebs and Symbicort  - history of tobacco abuse  - not on supplemental oxygen     5. DM2: uncontrolled (A1c 8.8)  Held oral DM meds.  Treated with insulins.    Now will dc home.  Pt will resume his home insulin and oral regimen.  He was encouraged to keep glucose log to take to PCP f/u next week.  Ck glucose at least bid     6. EDD:  - on BiPAP night. Continue as inpatient      7. Confusion on admission  -CT head negative  -likely secondary to dehydration, ARF, polypharmacy  -UDS +TCA's   -will continue to monitor, confusion has resolved at this time  --resolved.  Pt to dc home today.    F/u PCP 1 week for further mgmt.  Neurontin dose was reduced while inpt but pt reports his normal dose for a long time. He will resume prior dose on dc but will need close f/u with PCP for further dose/mgmt      8. Hypokalemia  -replace per protocol   --resolved.     Pt is currently hemodynamically stable and afebrile.  He has been up walking in the halls this afternoon per nurse in NAD.  No further c/o cp/neck pain.  Pt was seen by Dr Jiménez this afternoon.  He has been cleared for dc home to f/u as noted.        Addendum 9/16/19 at 1730 pm  :    Pt was seen this am.  Rcvd call from Dr Jiménez that pts cardiac stress/MPS was low risk for cardiac etiology.  Okay for pt to dc home.  Pt is currently hemodynamically stable and afebrile.  Per nurse, pt has been up walking without cp/soa complaints.  Tolerating diet.  Okay for dc home.  Agrees.     Dr Jiménez recs:  Atypical chest pain: Status post low risk cardiac evaluation to include a normal myocardial perfusion imaging.  Currently no clinical suspicion for cardiac etiology.  No further cardiac evaluation recommend at this time.     Congestive heart failure, chronic, diastolic: Currently euvolemic and compensated.  Renal insufficiency resolved with mild volume resuscitation.     Stable for discharge from a cardiac standpoint.  Would decrease Bumex to once daily dosing, otherwise continue previous cardiac medical therapy.  Will need BMP in 1 week and follow-up with me in 2 weeks.      Discharge Follow Up Recommendations for labs/diagnostics:  F/u [facility] 1 week with BMP (per Dr Jiménez recs)  F/u PCP 1 week  F/u Dr Jiménez 2 weeks --dc meds per DR Jiménez recs.     Note to PCP---mild AMS on admit.  Poss multifactorial.  Gave reduced gabapentin dose here, but also hydrated for JUAN and resolved.  He plans to resume prior home dose on dc.  Will need close f/u outpt and further dose adjustments per PCP.    Day of Discharge     HPI:   Pt was initially seen at 0950 am today resting back in bed in NAD.  Was just back from cardiac stress/MPS.  Had mild cp/neck pain immediate post stress test.  Has since resolved.  Now up ambulating, eating and complaint free.  Okay to dc home per Dr Jiménez with cards.  Low risk MPS/stress test.  Will have pt f/u with [facility] next week and Dr Jiménez per his recs.       Review of Systems  Gen- No fevers, chills  CV- No chest pain, palpitations  Resp- No cough, dyspnea  GI- No N/V/D, abd pain    Otherwise ROS is negative except as mentioned in the HPI.    Vital Signs:   Temp:  [97.8 °F (36.6 °C)-99 °F  (37.2 °C)] 99 °F (37.2 °C)  Heart Rate:  [] 87  Resp:  [16-20] 18  BP: (131-162)/(70-84) 162/84     Physical Exam:  Constitutional: No acute distress, awake, alert.  Resting up in bed. No visitors at bs  HENT: NCAT, mucous membranes moist  Respiratory: Clear to auscultation bilaterally A/P but decreased at bases, respiratory effort normal on RA  Cardiovascular: RRR, no murmurs, rubs, or gallops, palpable pedal pulses bilaterally  Gastrointestinal: Positive bowel sounds, soft, nontender, nondistended. Obese  Musculoskeletal: Bilat LE trace edema. GUNN spont   Psychiatric: Appropriate affect, cooperative and calm  Neurologic: Oriented x 3, strength symmetric in all extremities, Cranial Nerves grossly intact to confrontation, speech clear and appropriate.  Follows commands   Skin: No rashes. No diaphoresis.  Seen at 0950 am today     Pertinent  and/or Most Recent Results     Results from last 7 days   Lab Units 09/16/19  0356 09/15/19  0521 09/14/19  1744   WBC 10*3/mm3 5.35 6.80 7.81   HEMOGLOBIN g/dL 13.1 12.9* 13.5   HEMATOCRIT % 40.7 40.4 41.3   PLATELETS 10*3/mm3 161 174 196   SODIUM mmol/L 137 139 139   POTASSIUM mmol/L 4.4 3.4* 3.6   CHLORIDE mmol/L 98 96* 93*   CO2 mmol/L 27.0 30.0* 34.0*   BUN mg/dL 18 26* 26*   CREATININE mg/dL 0.87 1.18 1.46*   GLUCOSE mg/dL 295* 128* 227*   CALCIUM mg/dL 9.3 9.2 9.6     Results from last 7 days   Lab Units 09/14/19  1744   BILIRUBIN mg/dL 0.7   ALK PHOS U/L 71   ALT (SGPT) U/L 55*   AST (SGOT) U/L 57*           Invalid input(s): TG, LDLCALC, LDLREALC  Results from last 7 days   Lab Units 09/16/19  0356 09/15/19  0048 09/14/19  2208 09/14/19  1955 09/14/19  1745 09/14/19  1744   HEMOGLOBIN A1C % 8.80*  --   --   --   --   --    PROBNP pg/mL  --   --   --   --   --  41.0   TROPONIN T ng/mL  --  <0.010  --  0.028  --  0.036*   LACTATE mmol/L  --   --  2.0  --  2.6*  --        Brief Urine Lab Results  (Last result in the past 365 days)      Color   Clarity   Blood   Leuk  Est   Nitrite   Protein   CREAT   Urine HCG        09/15/19 1439 Yellow Clear Negative Negative Negative Negative               Microbiology Results Abnormal     None          Imaging Results (all)     Procedure Component Value Units Date/Time    CT Head Without Contrast [[id number]] Collected:  09/14/19 1942     Updated:  09/14/19 1945    Narrative:       CT Head WO 9/14/2019    HISTORY:   Acute onset of confusion today, mental status change. History of stroke 2 years ago.    TECHNIQUE:   Axial unenhanced head CT. Radiation dose reduction techniques included automated exposure control or exposure modulation based on body size. Count of known CT and cardiac nuc med studies performed in previous 12 months: 0.     Time of scan: 7:25 PM on 9/14/2019    COMPARISON:   1/28/2018    FINDINGS:   No intracranial hemorrhage, mass, or infarct. No hydrocephalus or extra-axial fluid collection. Brain parenchymal density is normal. The skull base, calvarium, and extracranial soft tissues are normal. There is mucosal thickening in the ethmoid air cells  and there is an air-fluid level in the left frontal sinus characteristic of sinusitis.      Impression:         1. No acute intracranial abnormality.  2. Mucosal thickening in the ethmoid air cells. Air-fluid level in the left frontal sinus characteristic of sinusitis..          Signer Name: Leonel Dia MD   Signed: 9/14/2019 7:42 PM   Workstation Name: [id number]    [facility]    XR Chest 1 View [[id number]] Collected:  09/14/19 1817     Updated:  09/14/19 1846    Narrative:          EXAMINATION: XR CHEST 1 VW - 09/14/2019     INDICATION: Shortness of air.     COMPARISON: None.     FINDINGS: Cardiac size within normal limits and unchanged from prior  status post median sternotomy. Low lung volumes with mild  hypoventilatory findings however no focal consolidation or overt edema.  No pneumothorax or pleural effusion.           Impression:       Low lung  volumes with hypoventilatory findings however no  overt edema or significant effusion with overall similar appearance to  recent prior comparison dated 09/06/2019.     DICTATED:   09/14/2019  EDITED/ls :   09/14/2019                 Results for orders placed during the hospital encounter of 12/17/17   Bilateral Carotid Duplex    Narrative · Proximal right internal carotid artery plaque without significant   stenosis.  · Right internal carotid artery stenosis of 0-49%.  · Proximal left internal carotid artery plaque without significant   stenosis.  · Left internal carotid artery stenosis of 0-49%.          Results for orders placed during the hospital encounter of 12/17/17   Bilateral Carotid Duplex    Narrative · Proximal right internal carotid artery plaque without significant   stenosis.  · Right internal carotid artery stenosis of 0-49%.  · Proximal left internal carotid artery plaque without significant   stenosis.  · Left internal carotid artery stenosis of 0-49%.          Results for orders placed during the hospital encounter of 05/22/19   Adult Transthoracic Echo Complete W/ Cont if Necessary Per Protocol    Narrative Technically very limited study   1) Mild LVH with normal LV systolic function ( EF is over 55%)  2) Mild left atrial enlargement   3) Trace MR and TR   4) Normal RV function          Discharge Details        Discharge Medications      New Medications      Instructions Start Date   acetaminophen 325 MG tablet  Commonly known as:  TYLENOL   650 mg, Oral, Every 4 Hours PRN      pantoprazole 40 MG EC tablet  Commonly known as:  PROTONIX  Replaces:  omeprazole 20 MG capsule   40 mg, Oral, Nightly         Changes to Medications      Instructions Start Date   bumetanide 2 MG tablet  Commonly known as:  BUMEX  What changed:    · when to take this  · additional instructions   2 mg, Oral, Daily, No new rx, dose changed from bid to daily per Dr Jessy WESTON KWIKPEN 100 UNIT/ML solution  pen-injector  Generic drug:  Insulin Lispro  What changed:    · how much to take  · additional instructions   10 Units, Subcutaneous, 3 Times Daily With Meals, Follows SSI From PCP      TOUJEO SOLOSTAR 300 UNIT/ML solution pen-injector injection  Generic drug:  Insulin Glargine  What changed:    · how much to take  · when to take this   60 Units, Injection, Daily         Continue These Medications      Instructions Start Date   albuterol sulfate  (90 Base) MCG/ACT inhaler  Commonly known as:  PROVENTIL HFA;VENTOLIN HFA;PROAIR HFA   2 puffs, Inhalation, Every 6 Hours PRN      amitriptyline 25 MG tablet  Commonly known as:  ELAVIL   75 mg, Oral, Nightly      amLODIPine 5 MG tablet  Commonly known as:  NORVASC   TAKE 1/2 TABLET BY MOUTH DAILY      ASMANEX  MCG/ACT aerosol  Generic drug:  Mometasone Furoate   1 puff, Inhalation, 2 Times Daily      aspirin 325 MG tablet   325 mg, Oral, Daily      atorvastatin 80 MG tablet  Commonly known as:  LIPITOR   80 mg, Oral, Nightly      azelastine 0.1 % nasal spray  Commonly known as:  ASTELIN   1 spray, Nasal, 2 Times Daily PRN, Use in each nostril as directed      budesonide-formoterol 80-4.5 MCG/ACT inhaler  Commonly known as:  SYMBICORT   2 puffs, Inhalation, 2 Times Daily - RT, Rinse mouth with water after use.      coenzyme Q10 100 MG capsule   100 mg, Oral, Daily      cyclobenzaprine 10 MG tablet  Commonly known as:  FLEXERIL   10 mg, Oral, 3 Times Daily PRN      CYCLOSET 0.8 MG tablet  Generic drug:  Bromocriptine Mesylate   3.2 mg, Oral, Every Morning      gabapentin 600 MG tablet  Commonly known as:  NEURONTIN   600 mg, Oral, 2 Times Daily      glucose blood test strip   Use as instructed      HYDROcodone-acetaminophen 5-325 MG per tablet  Commonly known as:  NORCO   1 tablet, Oral, Every 6 Hours PRN      ipratropium-albuterol 0.5-2.5 mg/3 ml nebulizer  Commonly known as:  DUO-NEB   3 mL, Nebulization, 4 Times Daily PRN      levocetirizine 5 MG  tablet  Commonly known as:  XYZAL   5 mg, Oral, Every Evening      lisinopril 20 MG tablet  Commonly known as:  PRINIVIL,ZESTRIL   20 mg, Oral, Daily      meclizine 25 MG tablet  Commonly known as:  ANTIVERT   25 mg, Oral, 3 Times Daily PRN      metoprolol tartrate 50 MG tablet  Commonly known as:  LOPRESSOR   take 1 tablet by mouth twice a day      ondansetron ODT 4 MG disintegrating tablet  Commonly known as:  ZOFRAN-ODT   4 mg, Oral, Every 6 Hours PRN      potassium chloride 20 MEQ CR tablet  Commonly known as:  K-DUR,KLOR-CON   20 mEq, Oral, 2 Times Daily      ranolazine 500 MG 12 hr tablet  Commonly known as:  RANEXA   take 1 tablet by mouth twice a day      Tiotropium Bromide Monohydrate 1.25 MCG/ACT aerosol solution inhaler  Commonly known as:  SPIRIVA RESPIMAT   2 puffs, Inhalation, Daily      TRINTELLIX 20 MG tablet  Generic drug:  Vortioxetine HBr   20 mg, Oral, Daily      TURMERIC PO   500 mg, Oral, 2 Times Daily      VICTOZA 18 MG/3ML solution pen-injector injection  Generic drug:  Liraglutide   0.6 mg, Subcutaneous, Daily      vitamin C 500 MG tablet  Commonly known as:  ASCORBIC ACID   500 mg, Oral, Daily      zafirlukast 20 MG tablet  Commonly known as:  ACCOLATE   20 mg, Oral, 2 Times Daily         Stop These Medications    omeprazole 20 MG capsule  Commonly known as:  priLOSEC  Replaced by:  pantoprazole 40 MG EC tablet            Allergies   Allergen Reactions   • Sulfa Antibiotics Anaphylaxis, Nausea And Vomiting and Delirium   • Codeine Nausea And Vomiting     Can only take with Phenergan   • Invokana [Canagliflozin]      DKA   • Morphine      Does not work. It causes pain         Discharge Disposition:  Home or Self Care    Diet:  Hospital:  Diet Order   Procedures   • Diet Regular; Cardiac, Consistent Carbohydrate     Discharge:   Diet Instructions     Diet: Regular, Cardiac, Consistent Carbohydrate; Thin      Discharge Diet:   Regular  Cardiac  Consistent Carbohydrate       Fluid Consistency:   Thin          Discharge Activity:   Activity Instructions     Activity as Tolerated      Measure Blood Pressure      Keep BP log to take to [facility]/PCP f/u's next week    Measure Weight              CODE STATUS:    Code Status and Medical Interventions:   Ordered at: 09/14/19 2308     Level Of Support Discussed With:    Patient     Code Status:    CPR     Medical Interventions (Level of Support Prior to Arrest):    Full         Future Appointments   Date Time Provider Department Center   10/9/2019  3:00 PM Ollie Allen PA-C [facility] None   11/27/2019 11:00 AM Matilde Bains APRN [facility] None   12/16/2019  2:45 PM London Jiménez III, MD [facility] None   3/2/2020 11:00 AM Linda Barajas MD [facility] None       Additional Instructions for the Follow-ups that You Need to Schedule     Discharge Follow-up with PCP   As directed       Currently Documented PCP:    Ottoniel Toleod MD    PCP Phone Number:    [phone]     Follow Up Details:  PCP 1 week of dc (pt will need to call PCP office in am to schedule as it is after office hours at time of dc home)         Discharge Follow-up with Specialty: Dr Jiménez in 2 weeks (pt will need to call office in am to schedule as it is after office hours at time of dc home)   As directed      Specialty:  Dr Jiménez in 2 weeks (pt will need to call office in am to schedule as it is after office hours at time of dc home)         Discharge Follow-up with Specified Provider: [facility] in 1 week (pt will need to call office in am to schedule as it is after office hours at time of dc home)   As directed      To:  [facility] in 1 week (pt will need to call office in am to schedule as it is after office hours at time of dc home)    Follow Up Details:  Dr Jiménez recommneds lynn of BMP at 1 week f/u.  Pt will then f/u with Dr Jiménez in 2 weeks of dc from hospital               Time Spent on Discharge:  50 minutes    Electronically signed by  Jerri Ojeda, ANTONIO, 09/16/19, 5:32 PM.

## 2019-09-17 ENCOUNTER — READMISSION MANAGEMENT (OUTPATIENT)
Dept: CALL CENTER | Facility: HOSPITAL | Age: 59
End: 2019-09-17

## 2019-09-17 NOTE — OUTREACH NOTE
Prep Survey      Responses   Facility patient discharged from?  [facility]   Is patient eligible?  Yes   Discharge diagnosis  Chest pain/HUTCHINS/CHF   Does the patient have one of the following disease processes/diagnoses(primary or secondary)?  CHF   Does the patient have Home health ordered?  No   Is there a DME ordered?  No   Prep survey completed?  Yes          Zeinab Lloyd RN

## 2019-09-18 ENCOUNTER — READMISSION MANAGEMENT (OUTPATIENT)
Dept: CALL CENTER | Facility: HOSPITAL | Age: 59
End: 2019-09-18

## 2019-09-18 NOTE — OUTREACH NOTE
CHF Week 1 Survey      Responses   Facility patient discharged from?  [place]   Does the patient have one of the following disease processes/diagnoses(primary or secondary)?  CHF   Is there a successful TCM telephone encounter documented?  No   CHF Week 1 attempt successful?  No   Unsuccessful attempts  Attempt 1          Morelia Patiño RN

## 2019-09-19 ENCOUNTER — READMISSION MANAGEMENT (OUTPATIENT)
Dept: CALL CENTER | Facility: HOSPITAL | Age: 59
End: 2019-09-19

## 2019-09-19 NOTE — OUTREACH NOTE
CHF Week 1 Survey      Responses   Facility patient discharged from?  [facility]   Does the patient have one of the following disease processes/diagnoses(primary or secondary)?  CHF   Is there a successful TCM telephone encounter documented?  No   CHF Week 1 attempt successful?  Yes   Call start time  1055   Call end time  1102   Discharge diagnosis  Chest pain/[facility]/CHF   List who call center can speak with  Mrs. Harding, spouse   Person spoke with today (if not patient) and relationship  Mrs. Harding, spouse   Meds reviewed with patient/caregiver?  Yes   Is the patient having any side effects they believe may be caused by any medication additions or changes?  No   Does the patient have all medications ordered at discharge?  Yes   Is the patient taking all medications as directed (includes completed medication regime)?  Yes   Does the patient have a primary care provider?   Yes   Does the patient have an appointment with their PCP within 7 days of discharge?  No   Comments regarding PCP  Ottoniel Toledo MD    What is preventing the patient from scheduling follow up appointments within 7 days of discharge?  Haven't had time   Nursing Interventions  Educated patient on importance of making appointment, Advised patient to make appointment   Has the patient kept scheduled appointments due by today?  N/A   Comments  New Patient with Sara ANTONIO Walker , Monday Sep 23, 2019 1:45 PM   Has home health visited the patient within 72 hours of discharge?  N/A   Psychosocial issues?  No   Did the patient receive a copy of their discharge instructions?  Yes   Nursing interventions  Reviewed instructions with patient   What is the patient's perception of their health status since discharge?  Improving   Nursing interventions  Nurse provided patient education   Is the patient weighing daily?  No   Does the patient have scales?  No   Daily weight interventions  Education provided on importance of daily weight   Is the patient  able to teach back Heart Failure diet management?  Yes   Is the patient able to teach back Heart Failure Zones?  No   Is the patient able to teach back signs and symptoms of worsening condition? (i.e. weight gain, shortness of air, etc.)  Yes   Is the patient/caregiver able to teach back the hierarchy of who to call/visit for symptoms/problems? PCP, Specialist, Home health nurse, Urgent Care, ED, 911  Yes    CHF Week 1 call completed?  Yes          Danielle Lara RN

## 2019-09-20 NOTE — PROGRESS NOTES
"[facility]  [facility]      Encounter Date:09/23/2019     Denzel Harding  [address] [place] [address]  [unfilled]    1960    Ottoniel Toledo MD    Denzel Harding is a 59 y.o. male.      Subjective:     Chief Complaint:  Congestive Heart Failure and Follow-up       HPI   Patient is a 59-year-old male with past medical history significant for CAD s/p CABG, COPD, CHF, hypertension, hyperlipidemia, type 2 diabetes and EDD with BiPAP who presents to the [facility] as a hospital referral for acute on chronic diastolic heart failure.  Patient admitted 9/14/2019 with chest pain, dyspnea and altered mental status.  Patient recently had his Lasix was to Bumex and metolazone and had lost 25 pounds in 4 days.  His creatinine doubled over the past week.  Patient underwent stress test with MPI which was low risk for ischemia.  JUAN resolved Bumex was resumed at once daily dosing.  Patient did have confusion which was thought to be secondary to dehydration, polypharmacy, acute renal insufficiency.  CT head negative and symptoms resolved.    Patient presents today stating that he has \"blown back up\" since reducing his Bumex to 2 mg once daily.  He notes 15 pound weight gain since hospitalization.  He knows ongoing chest tightness, which he thinks is related to fluid overload, abdominal distention, orthopnea and swelling.  He has not tried taking any extra Bumex due to fear of side effects.  He reports he packed his bag today in preparation of possible hospitalization    Patient Active Problem List   Diagnosis   • Coronary arteriosclerosis in native artery   • Lumbar radiculopathy   • Hypercholesterolemia   • Diabetic polyneuropathy associated with type 2 diabetes mellitus (CMS/[facility])   • Migraine with aura and with status migrainosus   • Palpitations   • Seizure disorder (CMS/[facility])   • GERD (gastroesophageal reflux disease)   • Brachial neuritis   • Cervical radiculopathy   • " LOM (loss of memory)   • Anxiety   • Diabetes mellitus (CMS/[facility])   • Lower back pain   • Hypertension   • History of CVA (cerebrovascular accident)   • Non morbid obesity due to excess calories   • Post-infarction pericarditis (CMS/[facility])   • HCAP (healthcare-associated pneumonia)   • CVA (S/P tPA)   • Acute bronchitis   • Type 2 diabetes mellitus (CMS/[facility])   • Headache syndrome, complicated   • Chronic intractable headache   • Obstructive sleep apnea syndrome   • Acute exacerbation of asthma with allergic rhinitis   • Asthma   • Thrush   • Acute diastolic CHF (congestive heart failure) (CMS/HCC)   • Chronic diastolic CHF (congestive heart failure) (CMS/HCC)   • COPD (chronic obstructive pulmonary disease) (CMS/HCC)   • Acute sinusitis       Past Medical History:   Diagnosis Date   • Anxiety    • Arthritis    • Asthma    • BiPAP (biphasic positive airway pressure) dependence    • Brachial neuritis 1/19/2017   • Chest pain    • CHF (congestive heart failure) (CMS/[facility])    • COPD (chronic obstructive pulmonary disease) (CMS/[facility])    • Coronary artery disease    • Depression    • Diabetes mellitus (CMS/[facility])    • Dizziness    • Edema    • GERD (gastroesophageal reflux disease)    • H/O chest x-ray 07/04/2015    Mild Left base atelectasis   • H/O echocardiogram 07/05/2015    Normal LVSF. EF of 60-65%. Grade 1 diastolic dysfunction of the LV myocardium. No evidence of pericardial effusion   • H/O exercise stress test    • History of herniated intervertebral disc     History of left L5-S1 disc herniation   • Hypertension    • LOM (loss of memory) 1/19/2017   • Lower back pain     Right   • Measles    • Obstructive sleep apnea    • Palpitations    • Pericardial effusion    • Seizure disorder (CMS/[facility])    • Seizures (CMS/HCC)     FOCAL    • Shortness of breath 1/19/2017   • SOB (shortness of breath)    • Stroke (CMS/[facility])    • Wears glasses        Past Surgical History:   Procedure Laterality Date   • BACK SURGERY     • CARDIAC  CATHETERIZATION     • CARDIAC CATHETERIZATION N/A 8/11/2017    Procedure: Coronary angiography;  Surgeon: Dallas Hernandez MD;  Location: [facility] CATH INVASIVE LOCATION;  Service:    • CARDIAC CATHETERIZATION N/A 8/11/2017    Procedure: Left Heart Cath;  Surgeon: Dallas Hernandez MD;  Location: [facility] CATH INVASIVE LOCATION;  Service:    • CARDIAC CATHETERIZATION N/A 8/11/2017    Procedure: Left ventriculography;  Surgeon: Dallas Hernandez MD;  Location: [facility] CATH INVASIVE LOCATION;  Service:    • CARDIOVASCULAR STRESS TEST  07/03/2017    WITH DR HERNANDEZ AT [facility]   • CARPAL TUNNEL RELEASE Right    • CATARACT EXTRACTION W/ INTRAOCULAR LENS IMPLANT Right 6/12/2017    Procedure: CATARACT PHACO EXTRACTION WITH INTRAOCULAR LENS IMPLANT RIGHT ;  Surgeon: Natalie Cao MD;  Location: [facility] OR;  Service:    • CATARACT EXTRACTION W/ INTRAOCULAR LENS IMPLANT Left 7/10/2017    Procedure: CATARACT PHACO EXTRACTION WITH INTRAOCULAR LENS IMPLANT LEFT;  Surgeon: Natalie Cao MD;  Location: [facility] OR;  Service:    • CHOLECYSTECTOMY     • CHOLECYSTECTOMY     • COLONOSCOPY     • CORONARY ANGIOPLASTY WITH STENT PLACEMENT     • CORONARY ANGIOPLASTY WITH STENT PLACEMENT      X1-LAD   • CORONARY ARTERY BYPASS GRAFT N/A 8/15/2017    Procedure: CORONARY ARTERY BYPASS GRAFTING x 3 UTILIZING THE LEFT INTERNAL MAMMARY ARTERY WITH ENDOSCOPIC VEIN HARVESTING OF THE RIGHT GREATER SAPHENOUS VEIN, HAMLET, LAD ENDARECTOMY;  Surgeon: London Damon MD;  Location: [facility] OR;  Service:    • ENDOSCOPY     • EYE SURGERY      cataract surgery both eyes   • KNEE ARTHROSCOPY Bilateral    • NEUROPLASTY / TRANSPOSITION ULNAR NERVE AT ELBOW     • OTHER SURGICAL HISTORY      Foraminotomy and discectomy   • PERICARDIAL WINDOW N/A 8/25/2017    Procedure: PERICARDIAL WINDOW;  Surgeon: Freeman Phillips MD;  Location: [facility] OR;  Service:        Family History   Problem Relation Age of Onset   • Hypertension Mother    • Arthritis Mother    • Alcohol abuse Father     • Heart disease Other    • Stroke Other    • Hypertension Other    • Other Other         Neurologic disorder   • Parkinsonism Other    • Stroke Other    • Heart disease Other    • Hypertension Other    • Heart disease Other    • Stroke Other    • Hypertension Other        Social History     Socioeconomic History   • Marital status:      Spouse name: Not on file   • Number of children: 1   • Years of education: Not on file   • Highest education level: Not on file   Occupational History   • Occupation: [employer]     Comment: Disabled since -Back Problems   Tobacco Use   • Smoking status: Former Smoker     Packs/day: 1.00     Years: 10.00     Pack years: 10.00     Types: Cigarettes     Last attempt to quit: 1998     Years since quittin.7   • Smokeless tobacco: Former User     Types: Snuff     Quit date:    Substance and Sexual Activity   • Alcohol use: Yes     Frequency: Monthly or less     Drinks per session: 3 or 4     Comment: 3 PER YEAR   • Drug use: No   • Sexual activity: Defer   Social History Narrative    Caffeine use: 0-1 serving daily.    Patient lives at home with wife.        Allergies   Allergen Reactions   • Sulfa Antibiotics Anaphylaxis, Nausea And Vomiting and Delirium   • Codeine Nausea And Vomiting     Can only take with Phenergan   • Invokana [Canagliflozin]      DKA   • Morphine      Does not work. It causes pain         Current Outpatient Medications:   •  acetaminophen (TYLENOL) 325 MG tablet, Take 2 tablets by mouth Every 4 (Four) Hours As Needed for Mild Pain ., Disp: , Rfl:   •  albuterol sulfate  (90 Base) MCG/ACT inhaler, Inhale 2 puffs Every 6 (Six) Hours As Needed for Wheezing or Shortness of Air., Disp: 1 inhaler, Rfl: 4  •  amitriptyline (ELAVIL) 25 MG tablet, TAKE 3 TABLETS BY MOUTH EVERY NIGHT, Disp: 90 tablet, Rfl: 11  •  amLODIPine (NORVASC) 5 MG tablet, TAKE 1/2 TABLET BY MOUTH DAILY, Disp: 45 tablet, Rfl: 3  •  ASMANEX   MCG/ACT aerosol, Inhale 1 puff 2 (Two) Times a Day. (Patient taking differently: Inhale 2 puffs 2 (Two) Times a Day.), Disp: 1 inhaler, Rfl: 5  •  aspirin 325 MG tablet, Take 1 tablet by mouth Daily., Disp: , Rfl:   •  atorvastatin (LIPITOR) 80 MG tablet, Take 1 tablet by mouth Every Night., Disp: 30 tablet, Rfl: 0  •  azelastine (ASTELIN) 0.1 % nasal spray, 1 spray into the nostril(s) as directed by provider 2 (Two) Times a Day As Needed for Rhinitis or Allergies. Use in each nostril as directed, Disp: 1 each, Rfl: 5  •  budesonide-formoterol (SYMBICORT) 80-4.5 MCG/ACT inhaler, Inhale 2 puffs 2 (Two) Times a Day. Rinse mouth with water after use., Disp: 1 inhaler, Rfl: 5  •  bumetanide (BUMEX) 2 MG tablet, Take 1 tablet by mouth Daily. No new rx, dose changed from bid to daily per Dr Jiménez, Disp: , Rfl:   •  coenzyme Q10 100 MG capsule, Take 100 mg by mouth Daily., Disp: , Rfl:   •  cyclobenzaprine (FLEXERIL) 10 MG tablet, Take 1 tablet by mouth 3 (Three) Times a Day As Needed for Muscle Spasms., Disp: 15 tablet, Rfl: 0  •  CYCLOSET 0.8 MG tablet, Take 3.2 mg by mouth Every Morning., Disp: , Rfl: 0  •  gabapentin (NEURONTIN) 600 MG tablet, Take 600 mg by mouth 2 (Two) Times a Day., Disp: , Rfl: 0  •  glucose blood test strip, Use as instructed, Disp: 100 each, Rfl: 12  •  HUMALOG KWIKPEN 100 UNIT/ML solution pen-injector, Inject 10 Units under the skin 3 (Three) Times a Day With Meals. Follows SSI From PCP (Patient taking differently: Inject 50 Units under the skin into the appropriate area as directed 3 (Three) Times a Day With Meals. Follows SSI From PCP), Disp: , Rfl: 0  •  HYDROcodone-acetaminophen (NORCO) 5-325 MG per tablet, Take 1 tablet by mouth Every 6 (Six) Hours As Needed for Severe Pain ., Disp: 12 tablet, Rfl: 0  •  ipratropium-albuterol (DUO-NEB) 0.5-2.5 mg/3 ml nebulizer, Take 3 mL by nebulization 4 (Four) Times a Day As Needed for Wheezing or Shortness of Air., Disp: 360 mL, Rfl: 5  •   levocetirizine (XYZAL) 5 MG tablet, Take 5 mg by mouth Every Evening., Disp: , Rfl: 0  •  lisinopril (PRINIVIL,ZESTRIL) 20 MG tablet, Take 20 mg by mouth Daily., Disp: , Rfl:   •  meclizine (ANTIVERT) 25 MG tablet, Take 1 tablet by mouth 3 (Three) Times a Day As Needed for dizziness., Disp: 10 tablet, Rfl: 0  •  metoprolol tartrate (LOPRESSOR) 50 MG tablet, take 1 tablet by mouth twice a day, Disp: 180 tablet, Rfl: 3  •  ondansetron ODT (ZOFRAN-ODT) 4 MG disintegrating tablet, Take 1 tablet by mouth Every 6 (Six) Hours As Needed for Nausea., Disp: 20 tablet, Rfl: 0  •  pantoprazole (PROTONIX) 40 MG EC tablet, Take 1 tablet by mouth Every Night., Disp: 30 tablet, Rfl: 0  •  potassium chloride (K-DUR,KLOR-CON) 20 MEQ CR tablet, Take 1 tablet by mouth 2 (Two) Times a Day., Disp: 8 tablet, Rfl: 0  •  ranolazine (RANEXA) 500 MG 12 hr tablet, take 1 tablet by mouth twice a day, Disp: 180 tablet, Rfl: 3  •  Tiotropium Bromide Monohydrate (SPIRIVA RESPIMAT) 1.25 MCG/ACT aerosol solution inhaler, Inhale 2 puffs Daily., Disp: 1 inhaler, Rfl: 5  •  TOUJEO SOLOSTAR 300 UNIT/ML solution pen-injector, Inject 60 Units as directed Daily. (Patient taking differently: Inject 160 Units as directed Every Night.), Disp: , Rfl: 0  •  TURMERIC PO, Take 500 mg by mouth 2 (Two) Times a Day., Disp: , Rfl:   •  VICTOZA 18 MG/3ML solution pen-injector injection, Inject 0.6 mg under the skin into the appropriate area as directed Daily., Disp: , Rfl: 0  •  vitamin C (ASCORBIC ACID) 500 MG tablet, Take 500 mg by mouth Daily., Disp: , Rfl:   •  Vortioxetine HBr (TRINTELLIX) 20 MG tablet, Take 20 mg by mouth Daily., Disp: , Rfl:   •  zafirlukast (ACCOLATE) 20 MG tablet, Take 1 tablet by mouth 2 (Two) Times a Day., Disp: 60 tablet, Rfl: 5    Current Facility-Administered Medications:   •  Dupilumab solution prefilled syringe 300 mg, 300 mg, Subcutaneous, Q14 Days, Matilde Bains APRN    The following portions of the patient's history  "were reviewed today and updated as appropriate: allergies, current medications, past family history, past medical history, past social history, past surgical history and problem list     Review of Systems   Constitution: Positive for weakness, malaise/fatigue and weight gain. Negative for chills and fever.   HENT: Positive for congestion and hearing loss.    Eyes: Negative.    Cardiovascular: Positive for chest pain, dyspnea on exertion, leg swelling, orthopnea, palpitations and paroxysmal nocturnal dyspnea. Negative for claudication, cyanosis, irregular heartbeat, near-syncope and syncope.   Respiratory: Positive for cough, shortness of breath, snoring and sputum production.    Endocrine: Positive for cold intolerance, heat intolerance and polyphagia.   Hematologic/Lymphatic: Does not bruise/bleed easily.   Skin: Negative for poor wound healing.   Musculoskeletal: Negative.    Gastrointestinal: Positive for bloating, heartburn and nausea. Negative for abdominal pain, hematemesis, melena and vomiting.   Genitourinary: Positive for nocturia. Negative for hematuria.   Neurological: Positive for dizziness, headaches, light-headedness and loss of balance.   Psychiatric/Behavioral: Positive for altered mental status, depression and memory loss. The patient has insomnia and is nervous/anxious.    Allergic/Immunologic: Negative.        Objective:     Vitals:    09/23/19 1342 09/23/19 1350 09/23/19 1351   BP: 136/69 126/60 134/61   BP Location: Left arm Right arm Left arm   Patient Position: Sitting Sitting Standing   Cuff Size: Large Adult Adult Adult   Pulse: 86 73 73   Resp: 20     Temp: 97.2 °F (36.2 °C)     TempSrc: Temporal     SpO2: 96% 94% 93%   Weight: (!) 148 kg (326 lb 8 oz)     Height: 177.8 cm (70\")         Physical Exam   Constitutional: He is oriented to person, place, and time. He appears well-developed and well-nourished. No distress.   HENT:   Head: Normocephalic.   Eyes: Conjunctivae are normal. Pupils " are equal, round, and reactive to light.   Neck: Neck supple. No JVD present. No thyromegaly present.   Cardiovascular: Normal rate, regular rhythm, normal heart sounds and intact distal pulses. Exam reveals no gallop and no friction rub.   No murmur heard.  Pulmonary/Chest: Effort normal and breath sounds normal. No respiratory distress. He has no wheezes. He has no rales. He exhibits no tenderness.   Abdominal: Soft. Bowel sounds are normal. He exhibits distension.   Musculoskeletal: Normal range of motion. He exhibits edema (2+ BLE).   Neurological: He is alert and oriented to person, place, and time.   Skin: Skin is warm and dry.   Psychiatric: He has a normal mood and affect. His behavior is normal. Thought content normal.   Vitals reviewed.      Lab and Diagnostic Review:  Echo 6/2019  Technically very limited study   1) Mild LVH with normal LV systolic function ( EF is over 55%)  2) Mild left atrial enlargement   3) Trace MR and TR   4) Normal RV function     Stress test 9/16/19  · Rest EF = 635 Stress EF = 67%.  · Left ventricular ejection fraction is normal (Calculated EF = 67%).  · Myocardial perfusion imaging indicates a normal myocardial perfusion study with no evidence of ischemia.  · Impressions are consistent with a low risk study.       Lab Results   Component Value Date    GLUCOSE 295 (H) 09/16/2019    CALCIUM 9.3 09/16/2019     09/16/2019    K 4.4 09/16/2019    CO2 27.0 09/16/2019    CL 98 09/16/2019    BUN 18 09/16/2019    CREATININE 0.87 09/16/2019    EGFRIFNONA 90 09/16/2019    BCR 20.7 09/16/2019    ANIONGAP 12.0 09/16/2019         Assessment and Plan:   1. Acute on chronic diastolic congestive heart failure (CMS/HCC)  IV diuresis today in office. Patient received 80mg lasix today through a butterfly in the LAC over slow IV push. During IV diuresis, vitals were monitored and stable. Please see IV diuresis record for those vitals. Patient voided 300 ml in the office prior to discharge from  the office. Butterfly was d/c'd and area was free of erythema, ecchymosis, or drainage.  Patient will receive a follow up call from the HF center in 24 hours to evaluate urinary output and reassess signs and symptoms.   Heart failure education provided today including signs and symptoms, causes of heart failure, medications, daily weights, low sodium diet (less than 2000 mg per day), 1.5L fluid restriction and daily physical activity as tolerated. Reviewed HF Zones with patient and family.  Could consider adding spironolactone in the future if renal function remains stable  Continue bumex 2mg daily. Advised to take extra PRN 3lb wt gain, SOB for 1-3 days  - Basic Metabolic Panel    2. Coronary arteriosclerosis in native artery  Stable without angina  Recent MPS normal    3. Essential hypertension  Controlled    4. Acute renal insufficiency  Resolved in hospital  Patient was on bumex BID and metolazone  Repeat BMP    Keep upcoming fu with Dr. Jiménez  RV in 2 months    It has been a pleasure to participate in the care of this patient.  Patient was instructed to call the [facility] with any questions, concerns, or worsening symptoms.    *Please note that portions of this note were completed with a voice recognition program. Efforts were made to edit the dictations, but occasionally words are mistranscribed.

## 2019-09-23 ENCOUNTER — OFFICE VISIT (OUTPATIENT)
Dept: CARDIOLOGY | Facility: HOSPITAL | Age: 59
End: 2019-09-23

## 2019-09-23 VITALS
RESPIRATION RATE: 20 BRPM | TEMPERATURE: 97.2 F | SYSTOLIC BLOOD PRESSURE: 134 MMHG | WEIGHT: 315 LBS | BODY MASS INDEX: 45.1 KG/M2 | HEIGHT: 70 IN | OXYGEN SATURATION: 93 % | HEART RATE: 73 BPM | DIASTOLIC BLOOD PRESSURE: 61 MMHG

## 2019-09-23 DIAGNOSIS — I25.10 CORONARY ARTERIOSCLEROSIS IN NATIVE ARTERY: ICD-10-CM

## 2019-09-23 DIAGNOSIS — N28.9 ACUTE RENAL INSUFFICIENCY: ICD-10-CM

## 2019-09-23 DIAGNOSIS — I10 ESSENTIAL HYPERTENSION: ICD-10-CM

## 2019-09-23 DIAGNOSIS — I50.33 ACUTE ON CHRONIC DIASTOLIC CONGESTIVE HEART FAILURE (HCC): Primary | ICD-10-CM

## 2019-09-23 LAB
ANION GAP SERPL CALCULATED.3IONS-SCNC: 14.7 MMOL/L (ref 5–15)
BUN BLD-MCNC: 15 MG/DL (ref 6–20)
BUN/CREAT SERPL: 18.1 (ref 7–25)
CALCIUM SPEC-SCNC: 9 MG/DL (ref 8.6–10.5)
CHLORIDE SERPL-SCNC: 98 MMOL/L (ref 98–107)
CO2 SERPL-SCNC: 25.3 MMOL/L (ref 22–29)
CREAT BLD-MCNC: 0.83 MG/DL (ref 0.76–1.27)
GFR SERPL CREATININE-BSD FRML MDRD: 95 ML/MIN/1.73
GLUCOSE BLD-MCNC: 223 MG/DL (ref 65–99)
POTASSIUM BLD-SCNC: 4.2 MMOL/L (ref 3.5–5.2)
SODIUM BLD-SCNC: 138 MMOL/L (ref 136–145)

## 2019-09-23 PROCEDURE — 80048 BASIC METABOLIC PNL TOTAL CA: CPT | Performed by: NURSE PRACTITIONER

## 2019-09-23 PROCEDURE — 99214 OFFICE O/P EST MOD 30 MIN: CPT | Performed by: NURSE PRACTITIONER

## 2019-09-23 RX ORDER — FLUNISOLIDE 0.25 MG/ML
SOLUTION NASAL
Qty: 50 ML | Refills: 0 | OUTPATIENT
Start: 2019-09-23

## 2019-09-24 ENCOUNTER — TELEPHONE (OUTPATIENT)
Dept: CARDIOLOGY | Facility: HOSPITAL | Age: 59
End: 2019-09-24

## 2019-09-24 DIAGNOSIS — I50.33 ACUTE ON CHRONIC DIASTOLIC CONGESTIVE HEART FAILURE (HCC): Primary | ICD-10-CM

## 2019-09-24 NOTE — TELEPHONE ENCOUNTER
"Results for orders placed or performed in visit on 09/23/19   Basic Metabolic Panel   Result Value Ref Range    Glucose 223 (H) 65 - 99 mg/dL    BUN 15 6 - 20 mg/dL    Creatinine 0.83 0.76 - 1.27 mg/dL    Sodium 138 136 - 145 mmol/L    Potassium 4.2 3.5 - 5.2 mmol/L    Chloride 98 98 - 107 mmol/L    CO2 25.3 22.0 - 29.0 mmol/L    Calcium 9.0 8.6 - 10.5 mg/dL    eGFR Non African Amer 95 >60 mL/min/1.73    BUN/Creatinine Ratio 18.1 7.0 - 25.0    Anion Gap 14.7 5.0 - 15.0 mmol/L     Called patient with lab results. Patient says he thinks he is feeling better and urinated a \"half of gallon\" yesterday. Advised to continue bumex 2mg daily. Can cut in half and take 1mg in AM and 1mg in PM if he feels like it is wearing off later in the day. Will recheck BMP in one week. Consider adding spironolactone if still having ongoing swelling  "

## 2019-09-26 ENCOUNTER — TELEPHONE (OUTPATIENT)
Dept: CARDIOLOGY | Facility: HOSPITAL | Age: 59
End: 2019-09-26

## 2019-09-26 ENCOUNTER — HOSPITAL ENCOUNTER (EMERGENCY)
Facility: HOSPITAL | Age: 59
Discharge: HOME | End: 2019-09-27
Attending: EMERGENCY MEDICINE | Admitting: EMERGENCY MEDICINE

## 2019-09-26 ENCOUNTER — APPOINTMENT (OUTPATIENT)
Dept: GENERAL RADIOLOGY | Facility: HOSPITAL | Age: 59
End: 2019-09-26

## 2019-09-26 DIAGNOSIS — R60.0 BILATERAL LOWER EXTREMITY EDEMA: Primary | ICD-10-CM

## 2019-09-26 DIAGNOSIS — Z86.79 HISTORY OF CONGESTIVE HEART FAILURE: ICD-10-CM

## 2019-09-26 DIAGNOSIS — R73.9 HYPERGLYCEMIA: ICD-10-CM

## 2019-09-26 LAB
ALBUMIN SERPL-MCNC: 4.1 G/DL (ref 3.5–5.2)
ALBUMIN/GLOB SERPL: 1.3 G/DL
ALP SERPL-CCNC: 77 U/L (ref 39–117)
ALT SERPL W P-5'-P-CCNC: 62 U/L (ref 1–41)
ANION GAP SERPL CALCULATED.3IONS-SCNC: 13 MMOL/L (ref 5–15)
AST SERPL-CCNC: 59 U/L (ref 1–40)
BASOPHILS # BLD AUTO: 0.05 10*3/MM3 (ref 0–0.2)
BASOPHILS NFR BLD AUTO: 0.8 % (ref 0–1.5)
BILIRUB SERPL-MCNC: 0.7 MG/DL (ref 0.2–1.2)
BUN BLD-MCNC: 16 MG/DL (ref 6–20)
BUN/CREAT SERPL: 15.7 (ref 7–25)
CALCIUM SPEC-SCNC: 9.6 MG/DL (ref 8.6–10.5)
CHLORIDE SERPL-SCNC: 99 MMOL/L (ref 98–107)
CO2 SERPL-SCNC: 31 MMOL/L (ref 22–29)
CREAT BLD-MCNC: 1.02 MG/DL (ref 0.76–1.27)
DEPRECATED RDW RBC AUTO: 40 FL (ref 37–54)
EOSINOPHIL # BLD AUTO: 0.24 10*3/MM3 (ref 0–0.4)
EOSINOPHIL NFR BLD AUTO: 3.8 % (ref 0.3–6.2)
ERYTHROCYTE [DISTWIDTH] IN BLOOD BY AUTOMATED COUNT: 13.4 % (ref 12.3–15.4)
GFR SERPL CREATININE-BSD FRML MDRD: 75 ML/MIN/1.73
GLOBULIN UR ELPH-MCNC: 3.1 GM/DL
GLUCOSE BLD-MCNC: 199 MG/DL (ref 65–99)
HCT VFR BLD AUTO: 43.2 % (ref 37.5–51)
HGB BLD-MCNC: 14 G/DL (ref 13–17.7)
HOLD SPECIMEN: NORMAL
HOLD SPECIMEN: NORMAL
IMM GRANULOCYTES # BLD AUTO: 0.06 10*3/MM3 (ref 0–0.05)
IMM GRANULOCYTES NFR BLD AUTO: 0.9 % (ref 0–0.5)
LYMPHOCYTES # BLD AUTO: 1.5 10*3/MM3 (ref 0.7–3.1)
LYMPHOCYTES NFR BLD AUTO: 23.7 % (ref 19.6–45.3)
MCH RBC QN AUTO: 26.5 PG (ref 26.6–33)
MCHC RBC AUTO-ENTMCNC: 32.4 G/DL (ref 31.5–35.7)
MCV RBC AUTO: 81.8 FL (ref 79–97)
MONOCYTES # BLD AUTO: 0.39 10*3/MM3 (ref 0.1–0.9)
MONOCYTES NFR BLD AUTO: 6.2 % (ref 5–12)
NEUTROPHILS # BLD AUTO: 4.08 10*3/MM3 (ref 1.7–7)
NEUTROPHILS NFR BLD AUTO: 64.6 % (ref 42.7–76)
NRBC BLD AUTO-RTO: 0 /100 WBC (ref 0–0.2)
NT-PROBNP SERPL-MCNC: 39.6 PG/ML (ref 5–900)
PLATELET # BLD AUTO: 225 10*3/MM3 (ref 140–450)
PMV BLD AUTO: 10.6 FL (ref 6–12)
POTASSIUM BLD-SCNC: 3.9 MMOL/L (ref 3.5–5.2)
PROT SERPL-MCNC: 7.2 G/DL (ref 6–8.5)
RBC # BLD AUTO: 5.28 10*6/MM3 (ref 4.14–5.8)
SODIUM BLD-SCNC: 143 MMOL/L (ref 136–145)
TROPONIN T SERPL-MCNC: 0.02 NG/ML (ref 0–0.03)
TROPONIN T SERPL-MCNC: 0.03 NG/ML (ref 0–0.03)
WBC NRBC COR # BLD: 6.32 10*3/MM3 (ref 3.4–10.8)
WHOLE BLOOD HOLD SPECIMEN: NORMAL
WHOLE BLOOD HOLD SPECIMEN: NORMAL

## 2019-09-26 PROCEDURE — 36415 COLL VENOUS BLD VENIPUNCTURE: CPT

## 2019-09-26 PROCEDURE — 83880 ASSAY OF NATRIURETIC PEPTIDE: CPT

## 2019-09-26 PROCEDURE — 96375 TX/PRO/DX INJ NEW DRUG ADDON: CPT

## 2019-09-26 PROCEDURE — 84484 ASSAY OF TROPONIN QUANT: CPT

## 2019-09-26 PROCEDURE — 71045 X-RAY EXAM CHEST 1 VIEW: CPT

## 2019-09-26 PROCEDURE — 99285 EMERGENCY DEPT VISIT HI MDM: CPT

## 2019-09-26 PROCEDURE — 93005 ELECTROCARDIOGRAM TRACING: CPT | Performed by: EMERGENCY MEDICINE

## 2019-09-26 PROCEDURE — 25010000002 PROMETHAZINE PER 50 MG: Performed by: EMERGENCY MEDICINE

## 2019-09-26 PROCEDURE — 84484 ASSAY OF TROPONIN QUANT: CPT | Performed by: EMERGENCY MEDICINE

## 2019-09-26 PROCEDURE — 80053 COMPREHEN METABOLIC PANEL: CPT

## 2019-09-26 PROCEDURE — 85025 COMPLETE CBC W/AUTO DIFF WBC: CPT

## 2019-09-26 PROCEDURE — 93005 ELECTROCARDIOGRAM TRACING: CPT

## 2019-09-26 PROCEDURE — 96374 THER/PROPH/DIAG INJ IV PUSH: CPT

## 2019-09-26 RX ORDER — BUMETANIDE 0.25 MG/ML
1 INJECTION INTRAMUSCULAR; INTRAVENOUS ONCE
Status: COMPLETED | OUTPATIENT
Start: 2019-09-26 | End: 2019-09-26

## 2019-09-26 RX ORDER — NITROGLYCERIN 0.4 MG/1
0.4 TABLET SUBLINGUAL
Status: DISCONTINUED | OUTPATIENT
Start: 2019-09-26 | End: 2019-09-27 | Stop reason: HOSPADM

## 2019-09-26 RX ORDER — ASPIRIN 325 MG
325 TABLET ORAL ONCE
Status: COMPLETED | OUTPATIENT
Start: 2019-09-26 | End: 2019-09-26

## 2019-09-26 RX ORDER — PROMETHAZINE HYDROCHLORIDE 25 MG/ML
12.5 INJECTION, SOLUTION INTRAMUSCULAR; INTRAVENOUS ONCE
Status: COMPLETED | OUTPATIENT
Start: 2019-09-26 | End: 2019-09-26

## 2019-09-26 RX ORDER — SODIUM CHLORIDE 0.9 % (FLUSH) 0.9 %
10 SYRINGE (ML) INJECTION AS NEEDED
Status: DISCONTINUED | OUTPATIENT
Start: 2019-09-26 | End: 2019-09-27 | Stop reason: HOSPADM

## 2019-09-26 RX ADMIN — ASPIRIN 325 MG ORAL TABLET 325 MG: 325 PILL ORAL at 21:36

## 2019-09-26 RX ADMIN — NITROGLYCERIN 0.4 MG: 0.4 TABLET SUBLINGUAL at 21:30

## 2019-09-26 RX ADMIN — NITROGLYCERIN 0.4 MG: 0.4 TABLET SUBLINGUAL at 21:35

## 2019-09-26 RX ADMIN — BUMETANIDE 1 MG: 0.25 INJECTION INTRAMUSCULAR; INTRAVENOUS at 21:44

## 2019-09-26 RX ADMIN — PROMETHAZINE HYDROCHLORIDE 12.5 MG: 25 INJECTION INTRAMUSCULAR; INTRAVENOUS at 22:16

## 2019-09-26 NOTE — TELEPHONE ENCOUNTER
Patient's wife called and stated that patient had a 3lb weight gain since yesterday, 6 out 10 chest pain, sob and feeling like he was smothering, nauseated, numbness and tingling in upper limbs, decreased appetite. Spoke with provider NEYDA Mckeon and advise patient and wife to come to the ED.  Patient and wife verbalized understanding.  I also advised them to call EMS should symptom elevate, pt and wife verbalized understanding and had no further questions.

## 2019-09-27 ENCOUNTER — READMISSION MANAGEMENT (OUTPATIENT)
Dept: CALL CENTER | Facility: HOSPITAL | Age: 59
End: 2019-09-27

## 2019-09-27 VITALS
TEMPERATURE: 98.1 F | HEART RATE: 98 BPM | DIASTOLIC BLOOD PRESSURE: 98 MMHG | BODY MASS INDEX: 45.1 KG/M2 | WEIGHT: 315 LBS | SYSTOLIC BLOOD PRESSURE: 157 MMHG | HEIGHT: 70 IN | RESPIRATION RATE: 18 BRPM | OXYGEN SATURATION: 92 %

## 2019-09-27 LAB
BILIRUB UR QL STRIP: NEGATIVE
CLARITY UR: CLEAR
COLOR UR: YELLOW
GLUCOSE UR STRIP-MCNC: NEGATIVE MG/DL
HGB UR QL STRIP.AUTO: NEGATIVE
KETONES UR QL STRIP: NEGATIVE
LEUKOCYTE ESTERASE UR QL STRIP.AUTO: NEGATIVE
NITRITE UR QL STRIP: NEGATIVE
PH UR STRIP.AUTO: <=5 [PH] (ref 5–8)
PROT UR QL STRIP: NEGATIVE
SP GR UR STRIP: 1.01 (ref 1–1.03)
UROBILINOGEN UR QL STRIP: NORMAL

## 2019-09-27 PROCEDURE — 96376 TX/PRO/DX INJ SAME DRUG ADON: CPT

## 2019-09-27 PROCEDURE — 81003 URINALYSIS AUTO W/O SCOPE: CPT | Performed by: EMERGENCY MEDICINE

## 2019-09-27 RX ORDER — BUMETANIDE 0.25 MG/ML
1 INJECTION INTRAMUSCULAR; INTRAVENOUS ONCE
Status: COMPLETED | OUTPATIENT
Start: 2019-09-27 | End: 2019-09-27

## 2019-09-27 RX ADMIN — BUMETANIDE 1 MG: 0.25 INJECTION INTRAMUSCULAR; INTRAVENOUS at 01:00

## 2019-09-27 NOTE — OUTREACH NOTE
CHF Week 2 Survey      Responses   Facility patient discharged from?  [facility]   Does the patient have one of the following disease processes/diagnoses(primary or secondary)?  CHF   Week 2 attempt successful?  No   Unsuccessful attempts  Attempt 1          Myra Boyd RN

## 2019-10-01 ENCOUNTER — READMISSION MANAGEMENT (OUTPATIENT)
Dept: CALL CENTER | Facility: HOSPITAL | Age: 59
End: 2019-10-01

## 2019-10-01 NOTE — OUTREACH NOTE
CHF Week 2 Survey      Responses   Facility patient discharged from?  [facility]   Does the patient have one of the following disease processes/diagnoses(primary or secondary)?  CHF   Week 2 attempt successful?  No   Unsuccessful attempts  Attempt 2          Yen Castañeda RN

## 2019-10-02 ENCOUNTER — OFFICE VISIT (OUTPATIENT)
Dept: CARDIOLOGY | Facility: CLINIC | Age: 59
End: 2019-10-02

## 2019-10-02 ENCOUNTER — OFFICE VISIT (OUTPATIENT)
Dept: NEUROLOGY | Facility: CLINIC | Age: 59
End: 2019-10-02

## 2019-10-02 VITALS
HEIGHT: 70 IN | BODY MASS INDEX: 45.1 KG/M2 | OXYGEN SATURATION: 93 % | WEIGHT: 315 LBS | HEART RATE: 89 BPM | DIASTOLIC BLOOD PRESSURE: 64 MMHG | SYSTOLIC BLOOD PRESSURE: 134 MMHG

## 2019-10-02 VITALS — HEIGHT: 70 IN | WEIGHT: 315 LBS | BODY MASS INDEX: 45.1 KG/M2

## 2019-10-02 DIAGNOSIS — R42 DIZZINESS: Primary | ICD-10-CM

## 2019-10-02 DIAGNOSIS — G43.111 INTRACTABLE MIGRAINE WITH AURA WITH STATUS MIGRAINOSUS: ICD-10-CM

## 2019-10-02 DIAGNOSIS — E78.00 HYPERCHOLESTEROLEMIA: ICD-10-CM

## 2019-10-02 DIAGNOSIS — I50.32 CHRONIC DIASTOLIC CHF (CONGESTIVE HEART FAILURE) (HCC): ICD-10-CM

## 2019-10-02 DIAGNOSIS — I25.10 CORONARY ARTERIOSCLEROSIS IN NATIVE ARTERY: Primary | ICD-10-CM

## 2019-10-02 PROCEDURE — 99214 OFFICE O/P EST MOD 30 MIN: CPT | Performed by: INTERNAL MEDICINE

## 2019-10-02 PROCEDURE — 99214 OFFICE O/P EST MOD 30 MIN: CPT | Performed by: PHYSICIAN ASSISTANT

## 2019-10-02 RX ORDER — METOLAZONE 5 MG/1
5 TABLET ORAL AS NEEDED
Qty: 10 TABLET | Refills: 1 | Status: SHIPPED | OUTPATIENT
Start: 2019-10-02 | End: 2019-10-08

## 2019-10-02 RX ORDER — FLUNISOLIDE 0.25 MG/ML
2 SOLUTION NASAL 2 TIMES DAILY
Refills: 0 | COMMUNITY
Start: 2019-09-14 | End: 2020-03-02 | Stop reason: SDUPTHER

## 2019-10-02 RX ORDER — BUMETANIDE 2 MG/1
2 TABLET ORAL 2 TIMES DAILY
Qty: 60 TABLET | Refills: 5 | Status: SHIPPED | OUTPATIENT
Start: 2019-10-02 | End: 2019-10-08 | Stop reason: SDUPTHER

## 2019-10-02 RX ORDER — FUROSEMIDE 40 MG/1
40 TABLET ORAL DAILY
Refills: 0 | COMMUNITY
Start: 2019-09-20 | End: 2019-10-02

## 2019-10-02 NOTE — PROGRESS NOTES
Subjective     Chief Complaint: headaches, dizziness      History of Present Illness   Denzel Harding is a 59 y.o. male who returns to clinic today for evaluation of headaches. He was previously followed by Dr. Shoemaker. He has noted symptoms for several years marked by a constant stabbing right frontal headache. He notes associated light and sound sensitivity. He also notes associated blurry vision and visual halos. He denies any modifying factors.      He has been hospitalized at [facility] several times over the last year. He was most recently hospitalized in 1/18 for a headache as well as left sided numbness and weakness. He also noted associated dizziness. An MRI of the brain at this time was unremarkable. A CTA of the head and neck was notable for stenotic plaque in the MCA vessels bilaterally, though was otherwise essentially unremarkable. An echo was unremarkable. He was treated with indomethacin, which was somewhat beneficial.      He was also hospitalized at [facility] in 12/17 for left sided weakness, slurred speech, and word finding difficulties. He noted associated left facial weakness. He was treated with IV tPA. He was also treated with ASA 325mg and Lipitor 80mg daily. However, his Lipitor was recently decreased to 40mg daily per neurosurgery.     He is currently taking amitriptyline 75mg nightly, TPM 100mg nightly and GBP 300mg BID. He was unable to tolerate higher doses of both of these medications. He was also unable to tolerate Depakote.      Today: Since his last visit in 5/18, he notes that his headaches continue to be improved. However, he now notes significant dizziness, described as lightheadedness primarily when moving his head to each side. There may be an associated spinning sensation, though this is less clear. There is also associated neck pain. A head CT in 9/19 was unremarkable.       I have reviewed and confirmed the past family, social and medical history as accurate on 10/2/19.     Review of  "Systems   Constitutional: Negative.    HENT: Negative.    Eyes: Negative.    Respiratory: Negative.    Cardiovascular: Negative.    Gastrointestinal: Negative.    Endocrine: Negative.    Genitourinary: Negative.    Musculoskeletal: Negative.    Skin: Negative.    Allergic/Immunologic: Negative.    Neurological: Positive for dizziness.   Hematological: Negative.    Psychiatric/Behavioral: Negative.        Objective     Ht 177.8 cm (70\")   Wt (!) 148 kg (326 lb 4.5 oz)   BMI 46.82 kg/m²     General appearance today is normal.       Physical Exam   Neurological: He has normal strength. He has a normal Finger-Nose-Finger Test.   Psychiatric: His speech is normal.        Neurologic Exam     Mental Status   Speech: speech is normal   Level of consciousness: alert  Normal comprehension.     Cranial Nerves   Cranial nerves II through XII intact.     Motor Exam   Muscle bulk: normal  Overall muscle tone: normal    Strength   Strength 5/5 throughout.     Gait, Coordination, and Reflexes     Gait  Gait: (unsteady with walker )    Coordination   Finger to nose coordination: normal    Tremor   Resting tremor: absent          Assessment/Plan   Denzel was seen today for neck pain.    Diagnoses and all orders for this visit:    Dizziness  -     Ambulatory Referral to Physical Therapy Evaluate and treat (dizziness ), Vestibular  -     CT Angiogram Neck With & Without Contrast  -     CT cervical spine wo contrast    Intractable migraine with aura with status migrainosus          Discussion/Summary   Denzel Harding returns to clinic today for evaluation of migraines and dizziness. This was discussed in detail. It was elected to obtain a CTA of the neck as well as a CT of his cervical spine given his associated neck pain. After discussing potential treatment options, it was elected to continue on his current medications unchanged. I have also made a referral to PT for potential vestibular rehabilitation. He will then follow up in 6 " months, or sooner if needed.   I spent 25 minutes face to face with the patient and family with 20 minutes spent on discussing diagnosis, prognosis, diagnostic testing, evaluation, current status, treatment options and management as discussed above.       As part of this visit I reviewed prior lab results, reviewed radiology results, reviewed radiology images, obtained additional history from the family which is incorporated in the HPI and reviewed records from prior hospitalizations which is incorporated in the HPI.      Lynnette Gabmle PA-C

## 2019-10-02 NOTE — PROGRESS NOTES
[facility]  Office visit  Denzel Harding  1960  [phone]    VISIT DATE:  10/2/2019      PCP: Ottoniel Toledo MD  [address]  [facility] [address]    CC:  Chief Complaint   Patient presents with   • Coronary arteriosclerosis in native artery       PROBLEM LIST:  1. Post op pericarditis with effusion s/p left anterior thoracotomy and pericardial window 8/25/17  2. CABG x 3, LAD endarterectomy 8/15/17 Dr. Damon, Normal EF, DHF  3. HTN: Controlled, BB and Norvasc  4. CVA: Remote  5. DM  6. COPD/EDD  7. HLD    Previous cardiac studies and procedures:  August 2017 cardiac catheterization  · Severe three-vessel coronary artery disease  · Preserved global and regional left ventricular systolic function  · Elevated left ventricular filling pressures consistent with diastolic heart failure.  · No significant left-sided valvular abnormalities appreciated    December 2017  Echo  · Left ventricular systolic function is normal.  · Left ventricular diastolic dysfunction (grade I a) consistent with impaired relaxation.  · Left ventricular wall thickness is consistent with mild concentric hypertrophy.  Carotid duplex  · Proximal right internal carotid artery plaque without significant stenosis.  · Right internal carotid artery stenosis of 0-49%.  · Proximal left internal carotid artery plaque without significant stenosis.  · Left internal carotid artery stenosis of 0-49%.    December 2018 myocardial perfusion imaging  · Left ventricular ejection fraction is borderline normal (Calculated EF = 50%).  · Myocardial perfusion imaging indicates a normal myocardial perfusion study with no evidence of ischemia.    6/2019 echo  Technically very limited study   1) Mild LVH with normal LV systolic function ( EF is over 55%)  2) Mild left atrial enlargement   3) Trace MR and TR   4) Normal RV function       ASSESSMENT:   Diagnosis Plan   1. Coronary arteriosclerosis in native artery     2. Chronic  "diastolic CHF (congestive heart failure) (CMS/[facility])     3. Hypercholesterolemia         PLAN:  Coronary artery disease: Normal EF Stable, status post surgical revascularization.  Recent reassuring myocardial perfusion imaging.  Continue aspirin, high intensity statin therapy and afterload reduction    Postop pericarditis status post pericardial window    Hypertension: goal less than 130/80, currently well controlled.  Continue current medical therapy.    Hyperlipidemia: Continue high intensity statin therapy, goal LDL less than 100    Congestive heart failure, acute on chronic, diastolic: Increasing Bumex to 2 mg p.o. twice daily.  Was instructed to take metolazone 5 mg p.o. 30 minutes prior to his first Bumex dose in 48 hours if he was not trending downward with his daily weights.  We will arrange for follow-up with heart valve clinic next week.  Continue low-sodium diet.    Hyperlipidemia: Goal LDL less than 70.  Continue atorvastatin 80 mg by mouth daily and predominant plant-based diet.    Subjective  He has reported gaining 15 pounds over the previous week.  Increase shortness of breath, lower extremity edema.  Reports being compliant with his medical therapy and low-sodium diet.  Denies chest discomfort or palpitations.  Using a cane for ambulation.  Reviewed previous evaluations of the heart valve clinic and ED.    PHYSICAL EXAMINATION:  Vitals:    10/02/19 1326   BP: 134/64   BP Location: Left arm   Patient Position: Sitting   Pulse: 89   SpO2: 93%   Weight: (!) 150 kg (331 lb)   Height: 177.8 cm (70\")     General Appearance:    Alert, cooperative, no distress, appears stated age   Head:    Normocephalic, without obvious abnormality, atraumatic   Eyes:    conjunctiva/corneas clear   Nose:   Nares normal, septum midline, mucosa normal, no drainage   Throat:   Lips, teeth and gums normal   Neck:   Supple, symmetrical, trachea midline, no carotid    bruit or JVD   Lungs:    Diminished at the bases bilaterally, " respirations unlabored   Chest Wall:    No tenderness or deformity    Heart:    Regular rate and rhythm, S1 and S2 normal, no murmur, rub   or gallop, normal carotid impulse bilaterally without bruit.   Abdomen:     Soft, non-tender   Extremities:   Extremities normal, atraumatic, no cyanosis, 2+ bilateral pretibial edema   Pulses:   2+ and symmetric all extremities   Skin:   Skin color, texture, turgor normal, no rashes or lesions       Diagnostic Data:  Procedures  Lab Results   Component Value Date    TRIG 77 06/01/2019    HDL 62 (H) 06/01/2019     Lab Results   Component Value Date    GLUCOSE 199 (H) 09/26/2019    BUN 16 09/26/2019    CREATININE 1.02 09/26/2019     09/26/2019    K 3.9 09/26/2019    CL 99 09/26/2019    CO2 31.0 (H) 09/26/2019     Lab Results   Component Value Date    HGBA1C 8.80 (H) 09/16/2019     Lab Results   Component Value Date    WBC 6.32 09/26/2019    HGB 14.0 09/26/2019    HCT 43.2 09/26/2019     09/26/2019       Allergies  Allergies   Allergen Reactions   • Sulfa Antibiotics Anaphylaxis, Nausea And Vomiting and Delirium   • Codeine Nausea And Vomiting     Can only take with Phenergan   • Invokana [Canagliflozin]      DKA   • Morphine      Does not work. It causes pain       Current Medications    Current Outpatient Medications:   •  acetaminophen (TYLENOL) 325 MG tablet, Take 2 tablets by mouth Every 4 (Four) Hours As Needed for Mild Pain ., Disp: , Rfl:   •  albuterol sulfate  (90 Base) MCG/ACT inhaler, Inhale 2 puffs Every 6 (Six) Hours As Needed for Wheezing or Shortness of Air., Disp: 1 inhaler, Rfl: 4  •  amitriptyline (ELAVIL) 25 MG tablet, TAKE 3 TABLETS BY MOUTH EVERY NIGHT, Disp: 90 tablet, Rfl: 11  •  amLODIPine (NORVASC) 5 MG tablet, TAKE 1/2 TABLET BY MOUTH DAILY, Disp: 45 tablet, Rfl: 3  •  ASMANEX  MCG/ACT aerosol, Inhale 1 puff 2 (Two) Times a Day. (Patient taking differently: Inhale 2 puffs 2 (Two) Times a Day.), Disp: 1 inhaler, Rfl: 5  •   aspirin 325 MG tablet, Take 1 tablet by mouth Daily., Disp: , Rfl:   •  atorvastatin (LIPITOR) 80 MG tablet, Take 1 tablet by mouth Every Night., Disp: 30 tablet, Rfl: 0  •  azelastine (ASTELIN) 0.1 % nasal spray, 1 spray into the nostril(s) as directed by provider 2 (Two) Times a Day As Needed for Rhinitis or Allergies. Use in each nostril as directed, Disp: 1 each, Rfl: 5  •  budesonide-formoterol (SYMBICORT) 80-4.5 MCG/ACT inhaler, Inhale 2 puffs 2 (Two) Times a Day. Rinse mouth with water after use., Disp: 1 inhaler, Rfl: 5  •  bumetanide (BUMEX) 2 MG tablet, Take 1 tablet by mouth Daily. No new rx, dose changed from bid to daily per Dr Jiménez, Disp: , Rfl:   •  coenzyme Q10 100 MG capsule, Take 100 mg by mouth Daily., Disp: , Rfl:   •  cyclobenzaprine (FLEXERIL) 10 MG tablet, Take 1 tablet by mouth 3 (Three) Times a Day As Needed for Muscle Spasms., Disp: 15 tablet, Rfl: 0  •  CYCLOSET 0.8 MG tablet, Take 3.2 mg by mouth Every Morning., Disp: , Rfl: 0  •  flunisolide (NASALIDE) 25 MCG/ACT (0.025%) solution nasal spray, 2 sprays 2 (Two) Times a Day., Disp: , Rfl: 0  •  furosemide (LASIX) 40 MG tablet, Take 40 mg by mouth Daily., Disp: , Rfl: 0  •  gabapentin (NEURONTIN) 600 MG tablet, Take 600 mg by mouth 2 (Two) Times a Day., Disp: , Rfl: 0  •  glucose blood test strip, Use as instructed, Disp: 100 each, Rfl: 12  •  HUMALOG KWIKPEN 100 UNIT/ML solution pen-injector, Inject 10 Units under the skin 3 (Three) Times a Day With Meals. Follows SSI From PCP (Patient taking differently: Inject 50 Units under the skin into the appropriate area as directed 3 (Three) Times a Day With Meals. Follows SSI From PCP), Disp: , Rfl: 0  •  HYDROcodone-acetaminophen (NORCO) 5-325 MG per tablet, Take 1 tablet by mouth Every 6 (Six) Hours As Needed for Severe Pain ., Disp: 12 tablet, Rfl: 0  •  ipratropium-albuterol (DUO-NEB) 0.5-2.5 mg/3 ml nebulizer, Take 3 mL by nebulization 4 (Four) Times a Day As Needed for Wheezing or Shortness  of Air., Disp: 360 mL, Rfl: 5  •  levocetirizine (XYZAL) 5 MG tablet, Take 5 mg by mouth Every Evening., Disp: , Rfl: 0  •  lisinopril (PRINIVIL,ZESTRIL) 20 MG tablet, Take 20 mg by mouth Daily., Disp: , Rfl:   •  meclizine (ANTIVERT) 25 MG tablet, Take 1 tablet by mouth 3 (Three) Times a Day As Needed for dizziness., Disp: 10 tablet, Rfl: 0  •  metoprolol tartrate (LOPRESSOR) 50 MG tablet, take 1 tablet by mouth twice a day, Disp: 180 tablet, Rfl: 3  •  ondansetron ODT (ZOFRAN-ODT) 4 MG disintegrating tablet, Take 1 tablet by mouth Every 6 (Six) Hours As Needed for Nausea., Disp: 20 tablet, Rfl: 0  •  pantoprazole (PROTONIX) 40 MG EC tablet, Take 1 tablet by mouth Every Night., Disp: 30 tablet, Rfl: 0  •  potassium chloride (K-DUR,KLOR-CON) 20 MEQ CR tablet, Take 1 tablet by mouth 2 (Two) Times a Day., Disp: 8 tablet, Rfl: 0  •  ranolazine (RANEXA) 500 MG 12 hr tablet, take 1 tablet by mouth twice a day, Disp: 180 tablet, Rfl: 3  •  Tiotropium Bromide Monohydrate (SPIRIVA RESPIMAT) 1.25 MCG/ACT aerosol solution inhaler, Inhale 2 puffs Daily., Disp: 1 inhaler, Rfl: 5  •  TOUJEO SOLOSTAR 300 UNIT/ML solution pen-injector, Inject 60 Units as directed Daily. (Patient taking differently: Inject 160 Units as directed Every Night.), Disp: , Rfl: 0  •  TURMERIC PO, Take 500 mg by mouth 2 (Two) Times a Day., Disp: , Rfl:   •  VICTOZA 18 MG/3ML solution pen-injector injection, Inject 0.6 mg under the skin into the appropriate area as directed Daily., Disp: , Rfl: 0  •  vitamin C (ASCORBIC ACID) 500 MG tablet, Take 500 mg by mouth Daily., Disp: , Rfl:   •  Vortioxetine HBr (TRINTELLIX) 20 MG tablet, Take 20 mg by mouth Daily., Disp: , Rfl:   •  zafirlukast (ACCOLATE) 20 MG tablet, Take 1 tablet by mouth 2 (Two) Times a Day., Disp: 60 tablet, Rfl: 5    Current Facility-Administered Medications:   •  Dupilumab solution prefilled syringe 300 mg, 300 mg, Subcutaneous, Q14 Days, Matilde Bains, APRN           ROS  Review of Systems   Cardiovascular: Positive for dyspnea on exertion, irregular heartbeat and leg swelling. Negative for chest pain and palpitations.   Respiratory: Positive for shortness of breath, sleep disturbances due to breathing, snoring, sputum production and wheezing. Negative for cough.    Neurological: Positive for dizziness.       SOCIAL HX  Social History     Socioeconomic History   • Marital status:      Spouse name: Not on file   • Number of children: 1   • Years of education: Not on file   • Highest education level: Not on file   Occupational History   • Occupation: [employer]     Comment: Disabled since -Back Problems   Tobacco Use   • Smoking status: Former Smoker     Packs/day: 1.00     Years: 10.00     Pack years: 10.00     Types: Cigarettes     Last attempt to quit: 1998     Years since quittin.7   • Smokeless tobacco: Former User     Types: Snuff     Quit date:    Substance and Sexual Activity   • Alcohol use: Yes     Frequency: Monthly or less     Drinks per session: 3 or 4     Comment: 3 PER YEAR   • Drug use: No   • Sexual activity: Defer   Social History Narrative    Caffeine use: 0-1 serving daily.    Patient lives at home with wife.        FAMILY HX  Family History   Problem Relation Age of Onset   • Hypertension Mother    • Arthritis Mother    • Alcohol abuse Father    • Heart disease Other    • Stroke Other    • Hypertension Other    • Other Other         Neurologic disorder   • Parkinsonism Other    • Stroke Other    • Heart disease Other    • Hypertension Other    • Heart disease Other    • Stroke Other    • Hypertension Other              London Jiménez III, MD, FACC

## 2019-10-07 NOTE — PROGRESS NOTES
[facility]  [facility]      Encounter Date:09/23/2019     Denzel Harding  [address] [place] [address]  [unfilled]    1960    Ottoniel Toledo MD    Denzel Harding is a 59 y.o. male.      Subjective:     Chief Complaint:  Congestive Heart Failure and Follow-up       HPI   Patient is a 59-year-old male with past medical history significant for CAD s/p CABG, COPD, chronic diastolic heart failure, hypertension, hyperlipidemia, type 2 diabetes and EDD with BiPAP who presents to the [facility] to follow up on chronic diastolic heart failure. Patient has had ongoing fluid overload since discharge 9/16 with [facility]. He was IV diuresed in our office 9/23/19 with only slight improvement in symptoms. He went to the ED 9/26 with chest pain and ongoing SOB.  BNP and chest x-ray was normal, creatinine stable.  He saw Dr. Jiménez on 10/2/2019 was up another 15 pounds and his Bumex was increased to 2 mg twice daily and he was instructed to start metolazone in 48 hours if his weight is not trending downward. Patient reports that he took one dose of metolazone and says he started cramping and hallucinating. He went to ED in [place] and magnesium and potassium was low. He had similar symptoms after taking metolazone last month. His wife notes some baseline confusion. Dyspnea has improved. He thinks he is down 13lbs in 2 weeks.     Patient Active Problem List   Diagnosis   • Coronary arteriosclerosis in native artery   • Lumbar radiculopathy   • Hypercholesterolemia   • Diabetic polyneuropathy associated with type 2 diabetes mellitus (CMS/HCC)   • Migraine with aura and with status migrainosus   • Palpitations   • Seizure disorder (CMS/HCC)   • GERD (gastroesophageal reflux disease)   • Brachial neuritis   • Cervical radiculopathy   • LOM (loss of memory)   • Anxiety   • Diabetes mellitus (CMS/HCC)   • Lower back pain   • Hypertension   • History of CVA (cerebrovascular accident)   •  Non morbid obesity due to excess calories   • Post-infarction pericarditis (CMS/[facility])   • HCAP (healthcare-associated pneumonia)   • CVA (S/P tPA)   • Acute bronchitis   • Type 2 diabetes mellitus (CMS/[facility])   • Headache syndrome, complicated   • Chronic intractable headache   • Obstructive sleep apnea syndrome   • Acute exacerbation of asthma with allergic rhinitis   • Asthma   • Thrush   • Acute diastolic CHF (congestive heart failure) (CMS/[facility])   • Chronic diastolic CHF (congestive heart failure) (CMS/[facility])   • COPD (chronic obstructive pulmonary disease) (CMS/[facility])   • Acute sinusitis       Past Medical History:   Diagnosis Date   • Anxiety    • Arthritis    • Asthma    • BiPAP (biphasic positive airway pressure) dependence    • Brachial neuritis 1/19/2017   • Chest pain    • CHF (congestive heart failure) (CMS/[facility])    • COPD (chronic obstructive pulmonary disease) (CMS/[facility])    • Coronary artery disease    • Depression    • Diabetes mellitus (CMS/[facility])    • Dizziness    • Edema    • GERD (gastroesophageal reflux disease)    • H/O chest x-ray 07/04/2015    Mild Left base atelectasis   • H/O echocardiogram 07/05/2015    Normal LVSF. EF of 60-65%. Grade 1 diastolic dysfunction of the LV myocardium. No evidence of pericardial effusion   • H/O exercise stress test    • History of herniated intervertebral disc     History of left L5-S1 disc herniation   • Hypertension    • LOM (loss of memory) 1/19/2017   • Lower back pain     Right   • Measles    • Obstructive sleep apnea    • Palpitations    • Pericardial effusion    • Seizure disorder (CMS/[facility])    • Seizures (CMS/[facility])     FOCAL    • Shortness of breath 1/19/2017   • SOB (shortness of breath)    • Stroke (CMS/[facility])    • Wears glasses        Past Surgical History:   Procedure Laterality Date   • BACK SURGERY     • CARDIAC CATHETERIZATION     • CARDIAC CATHETERIZATION N/A 8/11/2017    Procedure: Coronary angiography;  Surgeon: Dallas Kim MD;  Location: [facility]  INVASIVE LOCATION;  Service:    • CARDIAC CATHETERIZATION N/A 8/11/2017    Procedure: Left Heart Cath;  Surgeon: Dallas Hernandez MD;  Location: [facility] CATH INVASIVE LOCATION;  Service:    • CARDIAC CATHETERIZATION N/A 8/11/2017    Procedure: Left ventriculography;  Surgeon: Dallas Hernandez MD;  Location: [facility] CATH INVASIVE LOCATION;  Service:    • CARDIOVASCULAR STRESS TEST  07/03/2017    WITH DR HERNANDEZ AT [facility]   • CARPAL TUNNEL RELEASE Right    • CATARACT EXTRACTION W/ INTRAOCULAR LENS IMPLANT Right 6/12/2017    Procedure: CATARACT PHACO EXTRACTION WITH INTRAOCULAR LENS IMPLANT RIGHT ;  Surgeon: Natalie Cao MD;  Location:  [facility];  Service:    • CATARACT EXTRACTION W/ INTRAOCULAR LENS IMPLANT Left 7/10/2017    Procedure: CATARACT PHACO EXTRACTION WITH INTRAOCULAR LENS IMPLANT LEFT;  Surgeon: Natalie Cao MD;  Location: [facility] OR;  Service:    • CHOLECYSTECTOMY     • CHOLECYSTECTOMY     • COLONOSCOPY     • CORONARY ANGIOPLASTY WITH STENT PLACEMENT     • CORONARY ANGIOPLASTY WITH STENT PLACEMENT      X1-LAD   • CORONARY ARTERY BYPASS GRAFT N/A 8/15/2017    Procedure: CORONARY ARTERY BYPASS GRAFTING x 3 UTILIZING THE LEFT INTERNAL MAMMARY ARTERY WITH ENDOSCOPIC VEIN HARVESTING OF THE RIGHT GREATER SAPHENOUS VEIN, HAMLET, LAD ENDARECTOMY;  Surgeon: London Damon MD;  Location:  [facility] OR;  Service:    • ENDOSCOPY     • EYE SURGERY      cataract surgery both eyes   • KNEE ARTHROSCOPY Bilateral    • NEUROPLASTY / TRANSPOSITION ULNAR NERVE AT ELBOW     • OTHER SURGICAL HISTORY      Foraminotomy and discectomy   • PERICARDIAL WINDOW N/A 8/25/2017    Procedure: PERICARDIAL WINDOW;  Surgeon: Freeman Phillips MD;  Location:  [facility] OR;  Service:        Family History   Problem Relation Age of Onset   • Hypertension Mother    • Arthritis Mother    • Alcohol abuse Father    • Heart disease Other    • Stroke Other    • Hypertension Other    • Other Other         Neurologic disorder   • Parkinsonism Other    • Stroke Other     • Heart disease Other    • Hypertension Other    • Heart disease Other    • Stroke Other    • Hypertension Other        Social History     Socioeconomic History   • Marital status:      Spouse name: Not on file   • Number of children: 1   • Years of education: Not on file   • Highest education level: Not on file   Occupational History   • Occupation: [employer]     Comment: Disabled since -Back Problems   Tobacco Use   • Smoking status: Former Smoker     Packs/day: 1.00     Years: 10.00     Pack years: 10.00     Types: Cigarettes     Last attempt to quit: 1998     Years since quittin.7   • Smokeless tobacco: Former User     Types: Snuff     Quit date:    Substance and Sexual Activity   • Alcohol use: Yes     Frequency: Monthly or less     Drinks per session: 3 or 4     Comment: 3 PER YEAR   • Drug use: No   • Sexual activity: Defer   Social History Narrative    Caffeine use: 0-1 serving daily.    Patient lives at home with wife.        Allergies   Allergen Reactions   • Sulfa Antibiotics Anaphylaxis, Nausea And Vomiting and Delirium   • Codeine Nausea And Vomiting     Can only take with Phenergan   • Invokana [Canagliflozin]      DKA   • Morphine      Does not work. It causes pain         Current Outpatient Medications:   •  acetaminophen (TYLENOL) 325 MG tablet, Take 2 tablets by mouth Every 4 (Four) Hours As Needed for Mild Pain ., Disp: , Rfl:   •  albuterol sulfate  (90 Base) MCG/ACT inhaler, Inhale 2 puffs Every 6 (Six) Hours As Needed for Wheezing or Shortness of Air., Disp: 1 inhaler, Rfl: 4  •  amitriptyline (ELAVIL) 25 MG tablet, TAKE 3 TABLETS BY MOUTH EVERY NIGHT, Disp: 90 tablet, Rfl: 11  •  amLODIPine (NORVASC) 5 MG tablet, TAKE 1/2 TABLET BY MOUTH DAILY, Disp: 45 tablet, Rfl: 3  •  ASMANEX  MCG/ACT aerosol, Inhale 1 puff 2 (Two) Times a Day. (Patient taking differently: Inhale 2 puffs 2 (Two) Times a Day.), Disp: 1 inhaler, Rfl: 5  •  aspirin  325 MG tablet, Take 1 tablet by mouth Daily., Disp: , Rfl:   •  atorvastatin (LIPITOR) 80 MG tablet, Take 1 tablet by mouth Every Night., Disp: 30 tablet, Rfl: 0  •  azelastine (ASTELIN) 0.1 % nasal spray, 1 spray into the nostril(s) as directed by provider 2 (Two) Times a Day As Needed for Rhinitis or Allergies. Use in each nostril as directed, Disp: 1 each, Rfl: 5  •  budesonide-formoterol (SYMBICORT) 80-4.5 MCG/ACT inhaler, Inhale 2 puffs 2 (Two) Times a Day. Rinse mouth with water after use., Disp: 1 inhaler, Rfl: 5  •  bumetanide (BUMEX) 2 MG tablet, Take 1 tablet by mouth 2 (Two) Times a Day. No new rx, dose changed from bid to daily per Dr Jiménez, Disp: 60 tablet, Rfl: 5  •  coenzyme Q10 100 MG capsule, Take 100 mg by mouth Daily., Disp: , Rfl:   •  cyclobenzaprine (FLEXERIL) 10 MG tablet, Take 1 tablet by mouth 3 (Three) Times a Day As Needed for Muscle Spasms., Disp: 15 tablet, Rfl: 0  •  CYCLOSET 0.8 MG tablet, Take 3.2 mg by mouth Every Morning., Disp: , Rfl: 0  •  flunisolide (NASALIDE) 25 MCG/ACT (0.025%) solution nasal spray, 2 sprays 2 (Two) Times a Day., Disp: , Rfl: 0  •  gabapentin (NEURONTIN) 600 MG tablet, Take 600 mg by mouth 2 (Two) Times a Day., Disp: , Rfl: 0  •  glucose blood test strip, Use as instructed, Disp: 100 each, Rfl: 12  •  HUMALOG KWIKPEN 100 UNIT/ML solution pen-injector, Inject 10 Units under the skin 3 (Three) Times a Day With Meals. Follows SSI From PCP (Patient taking differently: Inject 50 Units under the skin into the appropriate area as directed 3 (Three) Times a Day With Meals. Follows SSI From PCP), Disp: , Rfl: 0  •  HYDROcodone-acetaminophen (NORCO) 5-325 MG per tablet, Take 1 tablet by mouth Every 6 (Six) Hours As Needed for Severe Pain ., Disp: 12 tablet, Rfl: 0  •  ipratropium-albuterol (DUO-NEB) 0.5-2.5 mg/3 ml nebulizer, Take 3 mL by nebulization 4 (Four) Times a Day As Needed for Wheezing or Shortness of Air., Disp: 360 mL, Rfl: 5  •  levocetirizine (XYZAL) 5 MG  tablet, Take 5 mg by mouth Every Evening., Disp: , Rfl: 0  •  lisinopril (PRINIVIL,ZESTRIL) 20 MG tablet, Take 20 mg by mouth Daily., Disp: , Rfl:   •  meclizine (ANTIVERT) 25 MG tablet, Take 1 tablet by mouth 3 (Three) Times a Day As Needed for dizziness., Disp: 10 tablet, Rfl: 0  •  metOLazone (ZAROXOLYN) 5 MG tablet, Take 1 tablet by mouth As Needed (as needed every 3 days for weight gain and edema)., Disp: 10 tablet, Rfl: 1  •  metoprolol tartrate (LOPRESSOR) 50 MG tablet, take 1 tablet by mouth twice a day, Disp: 180 tablet, Rfl: 3  •  ondansetron ODT (ZOFRAN-ODT) 4 MG disintegrating tablet, Take 1 tablet by mouth Every 6 (Six) Hours As Needed for Nausea., Disp: 20 tablet, Rfl: 0  •  pantoprazole (PROTONIX) 40 MG EC tablet, Take 1 tablet by mouth Every Night., Disp: 30 tablet, Rfl: 0  •  potassium chloride (K-DUR,KLOR-CON) 20 MEQ CR tablet, Take 1 tablet by mouth 2 (Two) Times a Day., Disp: 8 tablet, Rfl: 0  •  ranolazine (RANEXA) 500 MG 12 hr tablet, take 1 tablet by mouth twice a day, Disp: 180 tablet, Rfl: 3  •  Tiotropium Bromide Monohydrate (SPIRIVA RESPIMAT) 1.25 MCG/ACT aerosol solution inhaler, Inhale 2 puffs Daily., Disp: 1 inhaler, Rfl: 5  •  TOUJEO SOLOSTAR 300 UNIT/ML solution pen-injector, Inject 60 Units as directed Daily. (Patient taking differently: Inject 160 Units as directed Every Night.), Disp: , Rfl: 0  •  TURMERIC PO, Take 500 mg by mouth 2 (Two) Times a Day., Disp: , Rfl:   •  VICTOZA 18 MG/3ML solution pen-injector injection, Inject 0.6 mg under the skin into the appropriate area as directed Daily., Disp: , Rfl: 0  •  vitamin C (ASCORBIC ACID) 500 MG tablet, Take 500 mg by mouth Daily., Disp: , Rfl:   •  Vortioxetine HBr (TRINTELLIX) 20 MG tablet, Take 20 mg by mouth Daily., Disp: , Rfl:   •  zafirlukast (ACCOLATE) 20 MG tablet, Take 1 tablet by mouth 2 (Two) Times a Day., Disp: 60 tablet, Rfl: 5    Current Facility-Administered Medications:   •  Dupilumab solution prefilled syringe 300  "mg, 300 mg, Subcutaneous, Q14 Days, Veras-Matilde Jauregui, ANTONIO    The following portions of the patient's history were reviewed today and updated as appropriate: allergies, current medications, past family history, past medical history, past social history, past surgical history and problem list     Review of Systems   Constitution: Positive for weight loss. Negative for chills, fever, weakness, malaise/fatigue and weight gain.   Eyes: Negative.    Cardiovascular: Positive for dyspnea on exertion, leg swelling and paroxysmal nocturnal dyspnea. Negative for chest pain, claudication, cyanosis, irregular heartbeat, near-syncope, orthopnea, palpitations and syncope.   Respiratory: Positive for cough and shortness of breath.    Hematologic/Lymphatic: Does not bruise/bleed easily.   Skin: Negative for poor wound healing.   Musculoskeletal: Negative.    Gastrointestinal: Positive for heartburn and nausea. Negative for bloating, abdominal pain, hematemesis, melena and vomiting.   Genitourinary: Positive for nocturia. Negative for hematuria.   Neurological: Positive for dizziness, headaches, light-headedness and loss of balance.   Psychiatric/Behavioral: Positive for altered mental status, depression and memory loss. The patient has insomnia and is nervous/anxious.    Allergic/Immunologic: Negative.        Objective:     Vitals:    10/08/19 1349 10/08/19 1354   BP: 173/81 149/82   BP Location: Left arm Left arm   Patient Position: Sitting Sitting   Cuff Size: Adult Adult   Pulse: 84 79   Resp: 18    Temp: 97.2 °F (36.2 °C)    TempSrc: Temporal    SpO2: 94%    Weight: (!) 144 kg (318 lb 6 oz)    Height: 177.8 cm (70\")        Physical Exam   Constitutional: He is oriented to person, place, and time. He appears well-developed and well-nourished. No distress.   HENT:   Head: Normocephalic.   Eyes: Conjunctivae are normal. Pupils are equal, round, and reactive to light.   Neck: Neck supple. No JVD present. No thyromegaly " present.   Cardiovascular: Normal rate, regular rhythm, normal heart sounds and intact distal pulses. Exam reveals no gallop and no friction rub.   No murmur heard.  Pulmonary/Chest: Effort normal and breath sounds normal. No respiratory distress. He has no wheezes. He has no rales. He exhibits no tenderness.   Abdominal: Soft. Bowel sounds are normal. He exhibits no distension.   Musculoskeletal: Normal range of motion. He exhibits edema (1+ RLE, trace LLE).   Neurological: He is alert and oriented to person, place, and time.   Skin: Skin is warm and dry.   Psychiatric: He has a normal mood and affect. His behavior is normal. Thought content normal.   Vitals reviewed.      Lab and Diagnostic Review:  Echo 6/2019  Technically very limited study   1) Mild LVH with normal LV systolic function ( EF is over 55%)  2) Mild left atrial enlargement   3) Trace MR and TR   4) Normal RV function     Stress test 9/16/19  · Rest EF = 635 Stress EF = 67%.  · Left ventricular ejection fraction is normal (Calculated EF = 67%).  · Myocardial perfusion imaging indicates a normal myocardial perfusion study with no evidence of ischemia.  · Impressions are consistent with a low risk study.       Lab Results   Component Value Date    GLUCOSE 199 (H) 09/26/2019    CALCIUM 9.6 09/26/2019     09/26/2019    K 3.9 09/26/2019    CO2 31.0 (H) 09/26/2019    CL 99 09/26/2019    BUN 16 09/26/2019    CREATININE 1.02 09/26/2019    EGFRIFNONA 75 09/26/2019    BCR 15.7 09/26/2019    ANIONGAP 13.0 09/26/2019         Assessment and Plan:   1. Chronic diastolic congestive heart failure (CMS/HCC)  Appears euvolemic. Reportedly his creatinine jumped up to 1.5 on recent ED visit. Will consider decreasing bumex and potassium to once a day and adding aldactone  Continue to stay off metolazone  Encouraged low sodium diet and daily weights    2. Coronary arteriosclerosis in native artery  Stable without angina  Recent MPS normal  Continue statin, ASA,  BB    3. Essential hypertension  Elevated today. Does not monitor at home. Provided him with free blood pressure monitor and encouraged to start monitoring. Call if consistently >140/90    RV in one week with repeat BMP    ADDENDUM:  Received labs from [facility] drawn on 10/6/2019 which show a sodium of 131, potassium 3.3, chloride 88, CO2 35, BUN 22, creatinine 1.4, ALT 65, AST 50, magnesium 1.6, proBNP 31, troponin less than 0.017, WBC 8.7, hemoglobin 14.8, hematocrit 42.5, platelet 232  Called patient and instructed to decrease bumex to 2mg once a day. Decrease potassium to 20meq once a day. Starting tomorrow start aldactone. Continue to not take metolazone    It has been a pleasure to participate in the care of this patient.  Patient was instructed to call the [facility] with any questions, concerns, or worsening symptoms.    *Please note that portions of this note were completed with a voice recognition program. Efforts were made to edit the dictations, but occasionally words are mistranscribed.

## 2019-10-08 ENCOUNTER — OFFICE VISIT (OUTPATIENT)
Dept: CARDIOLOGY | Facility: HOSPITAL | Age: 59
End: 2019-10-08

## 2019-10-08 VITALS
RESPIRATION RATE: 18 BRPM | DIASTOLIC BLOOD PRESSURE: 82 MMHG | OXYGEN SATURATION: 94 % | HEIGHT: 70 IN | TEMPERATURE: 97.2 F | HEART RATE: 79 BPM | WEIGHT: 315 LBS | SYSTOLIC BLOOD PRESSURE: 149 MMHG | BODY MASS INDEX: 45.1 KG/M2

## 2019-10-08 DIAGNOSIS — I50.32 CHRONIC DIASTOLIC CHF (CONGESTIVE HEART FAILURE) (HCC): Primary | ICD-10-CM

## 2019-10-08 DIAGNOSIS — I25.10 CORONARY ARTERIOSCLEROSIS IN NATIVE ARTERY: ICD-10-CM

## 2019-10-08 DIAGNOSIS — I10 ESSENTIAL HYPERTENSION: ICD-10-CM

## 2019-10-08 PROCEDURE — 99214 OFFICE O/P EST MOD 30 MIN: CPT | Performed by: NURSE PRACTITIONER

## 2019-10-08 RX ORDER — POTASSIUM CHLORIDE 20 MEQ/1
20 TABLET, EXTENDED RELEASE ORAL DAILY
Qty: 8 TABLET | Refills: 0 | Status: ON HOLD
Start: 2019-10-08 | End: 2020-08-05

## 2019-10-08 RX ORDER — BUMETANIDE 2 MG/1
2 TABLET ORAL DAILY
Qty: 60 TABLET | Refills: 5 | Status: SHIPPED | OUTPATIENT
Start: 2019-10-08 | End: 2020-08-19 | Stop reason: HOSPADM

## 2019-10-08 RX ORDER — SPIRONOLACTONE 25 MG/1
25 TABLET ORAL DAILY
Qty: 30 TABLET | Refills: 2 | Status: SHIPPED | OUTPATIENT
Start: 2019-10-08 | End: 2020-04-27 | Stop reason: SDUPTHER

## 2019-10-09 ENCOUNTER — OFFICE VISIT (OUTPATIENT)
Dept: ORTHOPEDIC SURGERY | Facility: CLINIC | Age: 59
End: 2019-10-09

## 2019-10-09 ENCOUNTER — HOSPITAL ENCOUNTER (OUTPATIENT)
Dept: CT IMAGING | Facility: HOSPITAL | Age: 59
Discharge: HOME | End: 2019-10-09
Admitting: PHYSICIAN ASSISTANT

## 2019-10-09 VITALS — HEIGHT: 70 IN | BODY MASS INDEX: 45.68 KG/M2

## 2019-10-09 DIAGNOSIS — M17.11 PRIMARY LOCALIZED OSTEOARTHRITIS OF RIGHT KNEE: ICD-10-CM

## 2019-10-09 DIAGNOSIS — M25.562 ARTHRALGIA OF BOTH KNEES: Primary | ICD-10-CM

## 2019-10-09 DIAGNOSIS — M25.561 ARTHRALGIA OF BOTH KNEES: Primary | ICD-10-CM

## 2019-10-09 DIAGNOSIS — M17.12 PRIMARY LOCALIZED OSTEOARTHRITIS OF LEFT KNEE: ICD-10-CM

## 2019-10-09 PROCEDURE — 70498 CT ANGIOGRAPHY NECK: CPT

## 2019-10-09 PROCEDURE — 0 IOPAMIDOL PER 1 ML: Performed by: PHYSICIAN ASSISTANT

## 2019-10-09 PROCEDURE — 99213 OFFICE O/P EST LOW 20 MIN: CPT | Performed by: PHYSICIAN ASSISTANT

## 2019-10-09 PROCEDURE — 72125 CT NECK SPINE W/O DYE: CPT

## 2019-10-09 PROCEDURE — 20610 DRAIN/INJ JOINT/BURSA W/O US: CPT | Performed by: PHYSICIAN ASSISTANT

## 2019-10-09 RX ORDER — LIDOCAINE HYDROCHLORIDE 10 MG/ML
2 INJECTION, SOLUTION INFILTRATION; PERINEURAL
Status: COMPLETED | OUTPATIENT
Start: 2019-10-09 | End: 2019-10-09

## 2019-10-09 RX ORDER — METHYLPREDNISOLONE ACETATE 40 MG/ML
40 INJECTION, SUSPENSION INTRA-ARTICULAR; INTRALESIONAL; INTRAMUSCULAR; SOFT TISSUE
Status: COMPLETED | OUTPATIENT
Start: 2019-10-09 | End: 2019-10-09

## 2019-10-09 RX ADMIN — METHYLPREDNISOLONE ACETATE 40 MG: 40 INJECTION, SUSPENSION INTRA-ARTICULAR; INTRALESIONAL; INTRAMUSCULAR; SOFT TISSUE at 15:37

## 2019-10-09 RX ADMIN — LIDOCAINE HYDROCHLORIDE 2 ML: 10 INJECTION, SOLUTION INFILTRATION; PERINEURAL at 15:37

## 2019-10-09 RX ADMIN — IOPAMIDOL 95 ML: 755 INJECTION, SOLUTION INTRAVENOUS at 11:15

## 2019-10-09 RX ADMIN — METHYLPREDNISOLONE ACETATE 40 MG: 40 INJECTION, SUSPENSION INTRA-ARTICULAR; INTRALESIONAL; INTRAMUSCULAR; SOFT TISSUE at 15:31

## 2019-10-09 RX ADMIN — LIDOCAINE HYDROCHLORIDE 2 ML: 10 INJECTION, SOLUTION INFILTRATION; PERINEURAL at 15:31

## 2019-10-11 ENCOUNTER — TELEPHONE (OUTPATIENT)
Dept: NEUROLOGY | Facility: CLINIC | Age: 59
End: 2019-10-11

## 2019-10-11 NOTE — TELEPHONE ENCOUNTER
Spoke with Denzel and his wife and they would like to move forward with the referral to a Vascular surgeon for a consult.     Lynnette,  He would like to remain within [facility] if we have a Vascular surgeon here?

## 2019-10-11 NOTE — TELEPHONE ENCOUNTER
Lynnette Gamble PA-C Dieruf, Stephanie S, MA             Would you care to let Mr. Harding know that I reviewed his CTA. It was notable for moderate stenosis (or narrowing). Therefore, I would recommend a referral to vascular surgery for evaluation. His other CT was unremarkable. Thanks

## 2019-10-14 ENCOUNTER — READMISSION MANAGEMENT (OUTPATIENT)
Dept: CALL CENTER | Facility: HOSPITAL | Age: 59
End: 2019-10-14

## 2019-10-14 DIAGNOSIS — I65.29 STENOSIS OF CAROTID ARTERY, UNSPECIFIED LATERALITY: Primary | ICD-10-CM

## 2019-10-14 NOTE — OUTREACH NOTE
CHF Week 3 Survey      Responses   Facility patient discharged from?  [facility]   Does the patient have one of the following disease processes/diagnoses(primary or secondary)?  CHF   Week 3 attempt successful?  Yes   Call start time  1339   Call end time  1343   Discharge diagnosis  Chest pain/HUTCHINS/CHF   Meds reviewed with patient/caregiver?  Yes   Is the patient having any side effects they believe may be caused by any medication additions or changes?  No   Does the patient have all medications ordered at discharge?  Yes   Prescription comments  Pt reports provider added spironalctone   Is the patient taking all medications as directed (includes completed medication regime)?  Yes   Does the patient have a primary care provider?   Yes   Does the patient have an appointment with their PCP within 7 days of discharge?  Yes   Has the patient kept scheduled appointments due by today?  Yes   Comments  Pt is aware of appts.    Psychosocial issues?  No   Did the patient receive a copy of their discharge instructions?  Yes   Nursing interventions  Reviewed instructions with patient   What is the patient's perception of their health status since discharge?  Improving   Is the patient weighing daily?  No [Pt reports when he thinks about weighing himself]   Does the patient have scales?  Yes   Is the patient able to teach back Heart Failure diet management?  Yes   Is the patient able to teach back Heart Failure Zones?  No   Is the patient able to teach back signs and symptoms of worsening condition? (i.e. weight gain, shortness of air, etc.)  Yes   Is the patient/caregiver able to teach back the hierarchy of who to call/visit for symptoms/problems? PCP, Specialist, Home health nurse, Urgent Care, ED, 911  Yes   CHF Week 3 call completed?  Yes   Wrap up additional comments  Pt denies any questions at this time.           Mercy Rondon RN

## 2019-10-15 ENCOUNTER — APPOINTMENT (OUTPATIENT)
Dept: CT IMAGING | Facility: HOSPITAL | Age: 59
End: 2019-10-15

## 2019-10-15 ENCOUNTER — OFFICE VISIT (OUTPATIENT)
Dept: CARDIOLOGY | Facility: HOSPITAL | Age: 59
End: 2019-10-15

## 2019-10-15 ENCOUNTER — APPOINTMENT (OUTPATIENT)
Dept: GENERAL RADIOLOGY | Facility: HOSPITAL | Age: 59
End: 2019-10-15

## 2019-10-15 ENCOUNTER — LAB (OUTPATIENT)
Dept: LAB | Facility: HOSPITAL | Age: 59
End: 2019-10-15

## 2019-10-15 ENCOUNTER — HOSPITAL ENCOUNTER (EMERGENCY)
Facility: HOSPITAL | Age: 59
Discharge: HOME | End: 2019-10-16
Attending: EMERGENCY MEDICINE | Admitting: EMERGENCY MEDICINE

## 2019-10-15 ENCOUNTER — APPOINTMENT (OUTPATIENT)
Dept: MRI IMAGING | Facility: HOSPITAL | Age: 59
End: 2019-10-15

## 2019-10-15 VITALS
OXYGEN SATURATION: 97 % | TEMPERATURE: 97.6 F | HEIGHT: 70 IN | HEART RATE: 87 BPM | BODY MASS INDEX: 45.1 KG/M2 | SYSTOLIC BLOOD PRESSURE: 136 MMHG | WEIGHT: 315 LBS | DIASTOLIC BLOOD PRESSURE: 75 MMHG | RESPIRATION RATE: 18 BRPM

## 2019-10-15 DIAGNOSIS — R42 DIZZINESS: ICD-10-CM

## 2019-10-15 DIAGNOSIS — I65.23 BILATERAL CAROTID ARTERY STENOSIS: ICD-10-CM

## 2019-10-15 DIAGNOSIS — I50.32 CHRONIC DIASTOLIC CHF (CONGESTIVE HEART FAILURE) (HCC): ICD-10-CM

## 2019-10-15 DIAGNOSIS — I10 ESSENTIAL HYPERTENSION: ICD-10-CM

## 2019-10-15 DIAGNOSIS — R73.9 HYPERGLYCEMIA: ICD-10-CM

## 2019-10-15 DIAGNOSIS — I50.32 CHRONIC DIASTOLIC CHF (CONGESTIVE HEART FAILURE) (HCC): Primary | ICD-10-CM

## 2019-10-15 DIAGNOSIS — R41.82 ALTERED MENTAL STATUS, UNSPECIFIED ALTERED MENTAL STATUS TYPE: Primary | ICD-10-CM

## 2019-10-15 LAB
ALBUMIN SERPL-MCNC: 4.8 G/DL (ref 3.5–5.2)
ALBUMIN/GLOB SERPL: 1.5 G/DL
ALP SERPL-CCNC: 100 U/L (ref 39–117)
ALT SERPL W P-5'-P-CCNC: 63 U/L (ref 1–41)
ALT SERPL W P-5'-P-CCNC: 63 U/L (ref 1–41)
AMPHET+METHAMPHET UR QL: NEGATIVE
AMPHETAMINES UR QL: NEGATIVE
ANION GAP SERPL CALCULATED.3IONS-SCNC: 12 MMOL/L (ref 5–15)
ANION GAP SERPL CALCULATED.3IONS-SCNC: 12 MMOL/L (ref 5–15)
APTT PPP: 29.8 SECONDS (ref 24–37)
AST SERPL-CCNC: 52 U/L (ref 1–40)
BARBITURATES UR QL SCN: NEGATIVE
BASOPHILS # BLD AUTO: 0.1 10*3/MM3 (ref 0–0.2)
BASOPHILS NFR BLD AUTO: 1.1 % (ref 0–1.5)
BENZODIAZ UR QL SCN: NEGATIVE
BILIRUB SERPL-MCNC: 0.7 MG/DL (ref 0.2–1.2)
BILIRUB UR QL STRIP: NEGATIVE
BUN BLD-MCNC: 13 MG/DL (ref 6–20)
BUN BLD-MCNC: 13 MG/DL (ref 6–20)
BUN/CREAT SERPL: 14.8 (ref 7–25)
BUN/CREAT SERPL: 15.9 (ref 7–25)
BUPRENORPHINE SERPL-MCNC: NEGATIVE NG/ML
CALCIUM SPEC-SCNC: 10.1 MG/DL (ref 8.6–10.5)
CALCIUM SPEC-SCNC: 9.8 MG/DL (ref 8.6–10.5)
CANNABINOIDS SERPL QL: NEGATIVE
CHLORIDE SERPL-SCNC: 94 MMOL/L (ref 98–107)
CHLORIDE SERPL-SCNC: 95 MMOL/L (ref 98–107)
CLARITY UR: CLEAR
CO2 SERPL-SCNC: 30 MMOL/L (ref 22–29)
CO2 SERPL-SCNC: 31 MMOL/L (ref 22–29)
COCAINE UR QL: NEGATIVE
COLOR UR: YELLOW
CREAT BLD-MCNC: 0.82 MG/DL (ref 0.76–1.27)
CREAT BLD-MCNC: 0.88 MG/DL (ref 0.76–1.27)
DEPRECATED RDW RBC AUTO: 38.4 FL (ref 37–54)
EOSINOPHIL # BLD AUTO: 0.28 10*3/MM3 (ref 0–0.4)
EOSINOPHIL NFR BLD AUTO: 3.1 % (ref 0.3–6.2)
ERYTHROCYTE [DISTWIDTH] IN BLOOD BY AUTOMATED COUNT: 13.3 % (ref 12.3–15.4)
GFR SERPL CREATININE-BSD FRML MDRD: 89 ML/MIN/1.73
GFR SERPL CREATININE-BSD FRML MDRD: 96 ML/MIN/1.73
GLOBULIN UR ELPH-MCNC: 3.3 GM/DL
GLUCOSE BLD-MCNC: 360 MG/DL (ref 65–99)
GLUCOSE BLD-MCNC: 459 MG/DL (ref 65–99)
GLUCOSE BLDC GLUCOMTR-MCNC: 176 MG/DL (ref 70–130)
GLUCOSE BLDC GLUCOMTR-MCNC: 317 MG/DL (ref 70–130)
GLUCOSE UR STRIP-MCNC: ABNORMAL MG/DL
HCT VFR BLD AUTO: 49.1 % (ref 37.5–51)
HGB BLD-MCNC: 16 G/DL (ref 13–17.7)
HGB UR QL STRIP.AUTO: NEGATIVE
HOLD SPECIMEN: NORMAL
HOLD SPECIMEN: NORMAL
IMM GRANULOCYTES # BLD AUTO: 0.23 10*3/MM3 (ref 0–0.05)
IMM GRANULOCYTES NFR BLD AUTO: 2.6 % (ref 0–0.5)
INR PPP: 1.01 (ref 0.85–1.16)
KETONES UR QL STRIP: NEGATIVE
LEUKOCYTE ESTERASE UR QL STRIP.AUTO: NEGATIVE
LYMPHOCYTES # BLD AUTO: 1.75 10*3/MM3 (ref 0.7–3.1)
LYMPHOCYTES NFR BLD AUTO: 19.5 % (ref 19.6–45.3)
MAGNESIUM SERPL-MCNC: 2 MG/DL (ref 1.6–2.6)
MCH RBC QN AUTO: 26.1 PG (ref 26.6–33)
MCHC RBC AUTO-ENTMCNC: 32.6 G/DL (ref 31.5–35.7)
MCV RBC AUTO: 80 FL (ref 79–97)
METHADONE UR QL SCN: NEGATIVE
MONOCYTES # BLD AUTO: 0.54 10*3/MM3 (ref 0.1–0.9)
MONOCYTES NFR BLD AUTO: 6 % (ref 5–12)
NEUTROPHILS # BLD AUTO: 6.08 10*3/MM3 (ref 1.7–7)
NEUTROPHILS NFR BLD AUTO: 67.7 % (ref 42.7–76)
NITRITE UR QL STRIP: NEGATIVE
NRBC BLD AUTO-RTO: 0 /100 WBC (ref 0–0.2)
OPIATES UR QL: NEGATIVE
OXYCODONE UR QL SCN: NEGATIVE
PCP UR QL SCN: NEGATIVE
PH UR STRIP.AUTO: 6 [PH] (ref 5–8)
PLATELET # BLD AUTO: 246 10*3/MM3 (ref 140–450)
PMV BLD AUTO: 10.9 FL (ref 6–12)
POTASSIUM BLD-SCNC: 4.8 MMOL/L (ref 3.5–5.2)
POTASSIUM BLD-SCNC: 4.9 MMOL/L (ref 3.5–5.2)
PROPOXYPH UR QL: NEGATIVE
PROT SERPL-MCNC: 8.1 G/DL (ref 6–8.5)
PROT UR QL STRIP: NEGATIVE
PROTHROMBIN TIME: 12.8 SECONDS (ref 11.2–14.3)
RBC # BLD AUTO: 6.14 10*6/MM3 (ref 4.14–5.8)
SODIUM BLD-SCNC: 137 MMOL/L (ref 136–145)
SODIUM BLD-SCNC: 137 MMOL/L (ref 136–145)
SP GR UR STRIP: 1.04 (ref 1–1.03)
TRICYCLICS UR QL SCN: POSITIVE
TROPONIN T SERPL-MCNC: 0.02 NG/ML (ref 0–0.03)
TROPONIN T SERPL-MCNC: 0.02 NG/ML (ref 0–0.03)
UROBILINOGEN UR QL STRIP: ABNORMAL
WBC NRBC COR # BLD: 8.98 10*3/MM3 (ref 3.4–10.8)
WHOLE BLOOD HOLD SPECIMEN: NORMAL
WHOLE BLOOD HOLD SPECIMEN: NORMAL

## 2019-10-15 PROCEDURE — 96360 HYDRATION IV INFUSION INIT: CPT

## 2019-10-15 PROCEDURE — 70498 CT ANGIOGRAPHY NECK: CPT

## 2019-10-15 PROCEDURE — 85730 THROMBOPLASTIN TIME PARTIAL: CPT | Performed by: EMERGENCY MEDICINE

## 2019-10-15 PROCEDURE — 85610 PROTHROMBIN TIME: CPT | Performed by: EMERGENCY MEDICINE

## 2019-10-15 PROCEDURE — 85025 COMPLETE CBC W/AUTO DIFF WBC: CPT | Performed by: EMERGENCY MEDICINE

## 2019-10-15 PROCEDURE — 82962 GLUCOSE BLOOD TEST: CPT

## 2019-10-15 PROCEDURE — 81003 URINALYSIS AUTO W/O SCOPE: CPT | Performed by: EMERGENCY MEDICINE

## 2019-10-15 PROCEDURE — 84460 ALANINE AMINO (ALT) (SGPT): CPT | Performed by: EMERGENCY MEDICINE

## 2019-10-15 PROCEDURE — 0 IOPAMIDOL PER 1 ML: Performed by: EMERGENCY MEDICINE

## 2019-10-15 PROCEDURE — 71045 X-RAY EXAM CHEST 1 VIEW: CPT

## 2019-10-15 PROCEDURE — 83735 ASSAY OF MAGNESIUM: CPT | Performed by: EMERGENCY MEDICINE

## 2019-10-15 PROCEDURE — 84484 ASSAY OF TROPONIN QUANT: CPT | Performed by: EMERGENCY MEDICINE

## 2019-10-15 PROCEDURE — 70551 MRI BRAIN STEM W/O DYE: CPT

## 2019-10-15 PROCEDURE — 80307 DRUG TEST PRSMV CHEM ANLYZR: CPT | Performed by: EMERGENCY MEDICINE

## 2019-10-15 PROCEDURE — 70496 CT ANGIOGRAPHY HEAD: CPT

## 2019-10-15 PROCEDURE — 0042T HC CT CEREBRAL PERFUSION W/WO CONTRAST: CPT

## 2019-10-15 PROCEDURE — 96361 HYDRATE IV INFUSION ADD-ON: CPT

## 2019-10-15 PROCEDURE — 70450 CT HEAD/BRAIN W/O DYE: CPT

## 2019-10-15 PROCEDURE — 36415 COLL VENOUS BLD VENIPUNCTURE: CPT

## 2019-10-15 PROCEDURE — 93005 ELECTROCARDIOGRAM TRACING: CPT | Performed by: EMERGENCY MEDICINE

## 2019-10-15 PROCEDURE — 80053 COMPREHEN METABOLIC PANEL: CPT

## 2019-10-15 PROCEDURE — 99285 EMERGENCY DEPT VISIT HI MDM: CPT

## 2019-10-15 PROCEDURE — 99214 OFFICE O/P EST MOD 30 MIN: CPT | Performed by: NURSE PRACTITIONER

## 2019-10-15 RX ORDER — SODIUM CHLORIDE 9 MG/ML
125 INJECTION, SOLUTION INTRAVENOUS CONTINUOUS
Status: DISCONTINUED | OUTPATIENT
Start: 2019-10-15 | End: 2019-10-16 | Stop reason: HOSPADM

## 2019-10-15 RX ORDER — CYCLOBENZAPRINE HCL 10 MG
10 TABLET ORAL ONCE
Status: COMPLETED | OUTPATIENT
Start: 2019-10-15 | End: 2019-10-15

## 2019-10-15 RX ORDER — SODIUM CHLORIDE 0.9 % (FLUSH) 0.9 %
10 SYRINGE (ML) INJECTION AS NEEDED
Status: DISCONTINUED | OUTPATIENT
Start: 2019-10-15 | End: 2019-10-16 | Stop reason: HOSPADM

## 2019-10-15 RX ORDER — OMEPRAZOLE 20 MG/1
20 CAPSULE, DELAYED RELEASE ORAL DAILY
Refills: 0 | COMMUNITY
Start: 2019-10-14 | End: 2020-04-27 | Stop reason: SDUPTHER

## 2019-10-15 RX ADMIN — SODIUM CHLORIDE 1000 ML: 9 INJECTION, SOLUTION INTRAVENOUS at 18:46

## 2019-10-15 RX ADMIN — CYCLOBENZAPRINE HYDROCHLORIDE 10 MG: 10 TABLET, FILM COATED ORAL at 23:48

## 2019-10-15 RX ADMIN — SODIUM CHLORIDE 500 ML: 9 INJECTION, SOLUTION INTRAVENOUS at 22:24

## 2019-10-15 RX ADMIN — IOPAMIDOL 115 ML: 755 INJECTION, SOLUTION INTRAVENOUS at 16:44

## 2019-10-15 RX ADMIN — SODIUM CHLORIDE 125 ML/HR: 9 INJECTION, SOLUTION INTRAVENOUS at 19:31

## 2019-10-15 NOTE — PROGRESS NOTES
"  [facility]      Encounter Date:09/23/2019     Denzel Harding  [address] [facility] [place] [id number]  [unfilled]    1960    Ottoniel Toledo MD    Denzel Harding is a 59 y.o. male.      Subjective:     Chief Complaint:  Follow up CHF     HPI   Patient is a 59-year-old male with past medical history significant for CAD s/p CABG, COPD, chronic diastolic heart failure, hypertension, hyperlipidemia, type 2 diabetes and EDD with BiPAP who [facility] to follow up on chronic diastolic heart failure.  At his last visit he was started on spironolactone and his Bumex was decreased to 2 mg daily due to mild renal insufficiency and labs concerning for hypovolemia. He notes a 4lb weight gain in about a week. Some mild worsening HUTCHINS and chest fullness. He notes SBP in the 160s at home. Overall he does feel better on aldactone. He stays he is \"peeing like a racehorse\" and no longer has confusion or muscle cramping          Patient Active Problem List   Diagnosis   • Coronary arteriosclerosis in native artery   • Lumbar radiculopathy   • Hypercholesterolemia   • Diabetic polyneuropathy associated with type 2 diabetes mellitus (CMS/HCC)   • Migraine with aura and with status migrainosus   • Palpitations   • Seizure disorder (CMS/HCC)   • GERD (gastroesophageal reflux disease)   • Brachial neuritis   • Cervical radiculopathy   • LOM (loss of memory)   • Anxiety   • Diabetes mellitus (CMS/HCC)   • Lower back pain   • Hypertension   • History of CVA (cerebrovascular accident)   • Non morbid obesity due to excess calories   • Post-infarction pericarditis (CMS/HCC)   • HCAP (healthcare-associated pneumonia)   • CVA (S/P tPA)   • Acute bronchitis   • Type 2 diabetes mellitus (CMS/HCC)   • Headache syndrome, complicated   • Chronic intractable headache   • Obstructive sleep apnea syndrome   • Acute exacerbation of asthma with allergic rhinitis   • Asthma   • Thrush "   • Acute diastolic CHF (congestive heart failure) (CMS/[facility])   • Chronic diastolic CHF (congestive heart failure) (CMS/[facility])   • COPD (chronic obstructive pulmonary disease) (CMS/[facility])   • Acute sinusitis       Past Medical History:   Diagnosis Date   • Anxiety    • Arthritis    • Asthma    • BiPAP (biphasic positive airway pressure) dependence    • Brachial neuritis 1/19/2017   • Chest pain    • CHF (congestive heart failure) (CMS/[facility])    • COPD (chronic obstructive pulmonary disease) (CMS/[facility])    • Coronary artery disease    • Depression    • Diabetes mellitus (CMS/[facility])    • Dizziness    • Edema    • GERD (gastroesophageal reflux disease)    • H/O chest x-ray 07/04/2015    Mild Left base atelectasis   • H/O echocardiogram 07/05/2015    Normal LVSF. EF of 60-65%. Grade 1 diastolic dysfunction of the LV myocardium. No evidence of pericardial effusion   • H/O exercise stress test    • History of herniated intervertebral disc     History of left L5-S1 disc herniation   • Hypertension    • LOM (loss of memory) 1/19/2017   • Lower back pain     Right   • Measles    • Obstructive sleep apnea    • Palpitations    • Pericardial effusion    • Renal disorder    • Seizure disorder (CMS/[facility])    • Seizures (CMS/[facility])     FOCAL    • Shortness of breath 1/19/2017   • SOB (shortness of breath)    • Stroke (CMS/[facility])    • Wears glasses        Past Surgical History:   Procedure Laterality Date   • BACK SURGERY     • CARDIAC CATHETERIZATION     • CARDIAC CATHETERIZATION N/A 8/11/2017    Procedure: Coronary angiography;  Surgeon: Dallas Kim MD;  Location: [facility] CATH INVASIVE LOCATION;  Service:    • CARDIAC CATHETERIZATION N/A 8/11/2017    Procedure: Left Heart Cath;  Surgeon: Dallas Kim MD;  Location: [facility] CATH INVASIVE LOCATION;  Service:    • CARDIAC CATHETERIZATION N/A 8/11/2017    Procedure: Left ventriculography;  Surgeon: Dallas Kim MD;  Location: [facility] CATH INVASIVE LOCATION;  Service:    • CARDIOVASCULAR  STRESS TEST  07/03/2017    WITH DR HERNANDEZ AT [facility]   • CARPAL TUNNEL RELEASE Right    • CATARACT EXTRACTION W/ INTRAOCULAR LENS IMPLANT Right 6/12/2017    Procedure: CATARACT PHACO EXTRACTION WITH INTRAOCULAR LENS IMPLANT RIGHT ;  Surgeon: Natalie Cao MD;  Location: [facility] OR;  Service:    • CATARACT EXTRACTION W/ INTRAOCULAR LENS IMPLANT Left 7/10/2017    Procedure: CATARACT PHACO EXTRACTION WITH INTRAOCULAR LENS IMPLANT LEFT;  Surgeon: Natalie Cao MD;  Location: [facility] OR;  Service:    • CHOLECYSTECTOMY     • CHOLECYSTECTOMY     • COLONOSCOPY     • CORONARY ANGIOPLASTY WITH STENT PLACEMENT     • CORONARY ANGIOPLASTY WITH STENT PLACEMENT      X1-LAD   • CORONARY ARTERY BYPASS GRAFT N/A 8/15/2017    Procedure: CORONARY ARTERY BYPASS GRAFTING x 3 UTILIZING THE LEFT INTERNAL MAMMARY ARTERY WITH ENDOSCOPIC VEIN HARVESTING OF THE RIGHT GREATER SAPHENOUS VEIN, HAMLET, LAD ENDARECTOMY;  Surgeon: London Damon MD;  Location: [facility] OR;  Service:    • ENDOSCOPY     • EYE SURGERY      cataract surgery both eyes   • KNEE ARTHROSCOPY Bilateral    • KNEE ARTHROSCOPY Right 2010    Dr Lewis   • KNEE ARTHROSCOPY Left 2008    Dr Jameson   • NEUROPLASTY / TRANSPOSITION ULNAR NERVE AT ELBOW     • OTHER SURGICAL HISTORY      Foraminotomy and discectomy   • PERICARDIAL WINDOW N/A 8/25/2017    Procedure: PERICARDIAL WINDOW;  Surgeon: Freeman Phillips MD;  Location: [facility] OR;  Service:        Family History   Problem Relation Age of Onset   • Hypertension Mother    • Arthritis Mother    • Alcohol abuse Father    • Heart disease Other    • Stroke Other    • Hypertension Other    • Other Other         Neurologic disorder   • Parkinsonism Other    • Stroke Other    • Heart disease Other    • Hypertension Other    • Heart disease Other    • Stroke Other    • Hypertension Other        Social History     Socioeconomic History   • Marital status:      Spouse name: Not on file   • Number of children: 1   • Years of education: Not on file    • Highest education level: Not on file   Occupational History   • Occupation: [employer]     Employer: DISABLED     Comment: Disabled since -Back Problems   Tobacco Use   • Smoking status: Former Smoker     Packs/day: 1.00     Years: 10.00     Pack years: 10.00     Types: Cigarettes     Last attempt to quit: 1998     Years since quittin.8   • Smokeless tobacco: Former User     Types: Snuff     Quit date:    Substance and Sexual Activity   • Alcohol use: Yes     Frequency: Monthly or less     Drinks per session: 3 or 4     Comment: 3 PER YEAR   • Drug use: No   • Sexual activity: Defer   Social History Narrative    Caffeine use: 0-1 serving daily.    Patient lives at home with wife.        Allergies   Allergen Reactions   • Sulfa Antibiotics Anaphylaxis, Nausea And Vomiting and Delirium   • Codeine Nausea And Vomiting     Can only take with Phenergan   • Invokana [Canagliflozin]      DKA   • Morphine      Does not work. It causes pain         Current Outpatient Medications:   •  acetaminophen (TYLENOL) 325 MG tablet, Take 2 tablets by mouth Every 4 (Four) Hours As Needed for Mild Pain ., Disp: , Rfl:   •  albuterol sulfate  (90 Base) MCG/ACT inhaler, Inhale 2 puffs Every 6 (Six) Hours As Needed for Wheezing or Shortness of Air., Disp: 1 inhaler, Rfl: 4  •  amitriptyline (ELAVIL) 25 MG tablet, TAKE 3 TABLETS BY MOUTH EVERY NIGHT, Disp: 90 tablet, Rfl: 11  •  amLODIPine (NORVASC) 5 MG tablet, TAKE 1/2 TABLET BY MOUTH DAILY, Disp: 45 tablet, Rfl: 3  •  ASMANEX  MCG/ACT aerosol, Inhale 1 puff 2 (Two) Times a Day. (Patient taking differently: Inhale 2 puffs 2 (Two) Times a Day.), Disp: 1 inhaler, Rfl: 5  •  aspirin 325 MG tablet, Take 1 tablet by mouth Daily., Disp: , Rfl:   •  atorvastatin (LIPITOR) 80 MG tablet, Take 1 tablet by mouth Every Night., Disp: 30 tablet, Rfl: 0  •  azelastine (ASTELIN) 0.1 % nasal spray, 1 spray into the nostril(s) as directed by provider 2  (Two) Times a Day As Needed for Rhinitis or Allergies. Use in each nostril as directed, Disp: 1 each, Rfl: 5  •  budesonide-formoterol (SYMBICORT) 80-4.5 MCG/ACT inhaler, Inhale 2 puffs 2 (Two) Times a Day. Rinse mouth with water after use., Disp: 1 inhaler, Rfl: 5  •  bumetanide (BUMEX) 2 MG tablet, Take 1 tablet by mouth Daily. Take extra 2mg as needed for 3lb wt gain in 24hrs, increased shortness of breath, Disp: 60 tablet, Rfl: 5  •  coenzyme Q10 100 MG capsule, Take 100 mg by mouth Daily., Disp: , Rfl:   •  cyclobenzaprine (FLEXERIL) 10 MG tablet, Take 1 tablet by mouth 3 (Three) Times a Day As Needed for Muscle Spasms., Disp: 15 tablet, Rfl: 0  •  CYCLOSET 0.8 MG tablet, Take 3.2 mg by mouth Every Morning., Disp: , Rfl: 0  •  flunisolide (NASALIDE) 25 MCG/ACT (0.025%) solution nasal spray, 2 sprays 2 (Two) Times a Day., Disp: , Rfl: 0  •  gabapentin (NEURONTIN) 600 MG tablet, Take 600 mg by mouth 2 (Two) Times a Day., Disp: , Rfl: 0  •  glucose blood test strip, Use as instructed, Disp: 100 each, Rfl: 12  •  HUMALOG KWIKPEN 100 UNIT/ML solution pen-injector, Inject 10 Units under the skin 3 (Three) Times a Day With Meals. Follows SSI From PCP (Patient taking differently: Inject 50 Units under the skin into the appropriate area as directed 3 (Three) Times a Day With Meals. Follows SSI From PCP), Disp: , Rfl: 0  •  HYDROcodone-acetaminophen (NORCO) 5-325 MG per tablet, Take 1 tablet by mouth Every 6 (Six) Hours As Needed for Severe Pain ., Disp: 12 tablet, Rfl: 0  •  ipratropium-albuterol (DUO-NEB) 0.5-2.5 mg/3 ml nebulizer, Take 3 mL by nebulization 4 (Four) Times a Day As Needed for Wheezing or Shortness of Air., Disp: 360 mL, Rfl: 5  •  levocetirizine (XYZAL) 5 MG tablet, Take 5 mg by mouth Every Evening., Disp: , Rfl: 0  •  lisinopril (PRINIVIL,ZESTRIL) 20 MG tablet, Take 20 mg by mouth Daily., Disp: , Rfl:   •  meclizine (ANTIVERT) 25 MG tablet, Take 1 tablet by mouth 3 (Three) Times a Day As Needed for  dizziness., Disp: 10 tablet, Rfl: 0  •  metoprolol tartrate (LOPRESSOR) 50 MG tablet, take 1 tablet by mouth twice a day, Disp: 180 tablet, Rfl: 3  •  omeprazole (priLOSEC) 20 MG capsule, Take 20 mg by mouth Daily., Disp: , Rfl: 0  •  ondansetron ODT (ZOFRAN-ODT) 4 MG disintegrating tablet, Take 1 tablet by mouth Every 6 (Six) Hours As Needed for Nausea., Disp: 20 tablet, Rfl: 0  •  potassium chloride (K-DUR,KLOR-CON) 20 MEQ CR tablet, Take 1 tablet by mouth Daily., Disp: 8 tablet, Rfl: 0  •  ranolazine (RANEXA) 500 MG 12 hr tablet, take 1 tablet by mouth twice a day, Disp: 180 tablet, Rfl: 3  •  spironolactone (ALDACTONE) 25 MG tablet, Take 1 tablet by mouth Daily., Disp: 30 tablet, Rfl: 2  •  Tiotropium Bromide Monohydrate (SPIRIVA RESPIMAT) 1.25 MCG/ACT aerosol solution inhaler, Inhale 2 puffs Daily., Disp: 1 inhaler, Rfl: 5  •  TOUJEO SOLOSTAR 300 UNIT/ML solution pen-injector, Inject 60 Units as directed Daily. (Patient taking differently: Inject 160 Units as directed Every Night.), Disp: , Rfl: 0  •  TURMERIC PO, Take 500 mg by mouth 2 (Two) Times a Day., Disp: , Rfl:   •  VICTOZA 18 MG/3ML solution pen-injector injection, Inject 0.6 mg under the skin into the appropriate area as directed Daily., Disp: , Rfl: 0  •  vitamin C (ASCORBIC ACID) 500 MG tablet, Take 500 mg by mouth Daily., Disp: , Rfl:   •  Vortioxetine HBr (TRINTELLIX) 20 MG tablet, Take 20 mg by mouth Daily., Disp: , Rfl:   •  zafirlukast (ACCOLATE) 20 MG tablet, Take 1 tablet by mouth 2 (Two) Times a Day., Disp: 60 tablet, Rfl: 5    Current Facility-Administered Medications:   •  Dupilumab solution prefilled syringe 300 mg, 300 mg, Subcutaneous, Q14 Days, Matilde Bains APRN    The following portions of the patient's history were reviewed today and updated as appropriate: allergies, current medications, past family history, past medical history, past social history, past surgical history and problem list     Review of Systems  "  Constitution: Positive for malaise/fatigue and weight loss. Negative for chills, fever, weakness and weight gain.   Eyes: Negative.    Cardiovascular: Positive for dyspnea on exertion, leg swelling and paroxysmal nocturnal dyspnea. Negative for chest pain, claudication, cyanosis, irregular heartbeat, near-syncope, orthopnea, palpitations and syncope.   Respiratory: Positive for cough and shortness of breath.    Hematologic/Lymphatic: Does not bruise/bleed easily.   Skin: Negative for poor wound healing.   Musculoskeletal: Negative.    Gastrointestinal: Positive for bloating, heartburn and nausea. Negative for abdominal pain, hematemesis, melena and vomiting.   Genitourinary: Positive for nocturia. Negative for hematuria.   Neurological: Positive for dizziness, headaches, light-headedness and loss of balance.   Psychiatric/Behavioral: Positive for altered mental status, depression and memory loss. The patient has insomnia and is nervous/anxious.    Allergic/Immunologic: Negative.        Objective:     Vitals:    10/15/19 1350   BP: 165/80   BP Location: Left arm   Patient Position: Sitting   Cuff Size: Adult   Pulse: 82   Resp: 18   Temp: 97.6 °F (36.4 °C)   TempSrc: Temporal   SpO2: 97%   Weight: (!) 146 kg (322 lb)   Height: 177.8 cm (70\")       Physical Exam   Constitutional: He is oriented to person, place, and time. He appears well-developed and well-nourished. No distress.   HENT:   Head: Normocephalic.   Eyes: Conjunctivae are normal. Pupils are equal, round, and reactive to light.   Neck: Neck supple. No JVD present. No thyromegaly present.   Cardiovascular: Normal rate, regular rhythm, normal heart sounds and intact distal pulses. Exam reveals no gallop and no friction rub.   No murmur heard.  Pulmonary/Chest: Effort normal and breath sounds normal. No respiratory distress. He has no wheezes. He has no rales. He exhibits no tenderness.   Abdominal: Soft. Bowel sounds are normal. He exhibits no distension. " "  Musculoskeletal: Normal range of motion. He exhibits edema (1+ RLE, trace LLE).   Neurological: He is alert and oriented to person, place, and time.   Skin: Skin is warm and dry.   Psychiatric: He has a normal mood and affect. His behavior is normal. Thought content normal.   Vitals reviewed.      Lab and Diagnostic Review:  Echo 6/2019  Technically very limited study   1) Mild LVH with normal LV systolic function ( EF is over 55%)  2) Mild left atrial enlargement   3) Trace MR and TR   4) Normal RV function     Stress test 9/16/19  · Rest EF = 65 Stress EF = 67%.  · Left ventricular ejection fraction is normal (Calculated EF = 67%).  · Myocardial perfusion imaging indicates a normal myocardial perfusion study with no evidence of ischemia.  · Impressions are consistent with a low risk study.       Labs from [facility] drawn on 10/6/2019 which show a sodium of 131, potassium 3.3, chloride 88, CO2 35, BUN 22, creatinine 1.4, ALT 65, AST 50, magnesium 1.6, proBNP 31, troponin less than 0.017, WBC 8.7, hemoglobin 14.8, hematocrit 42.5, platelet 232        Assessment and Plan:   1. Chronic diastolic congestive heart failure (CMS/HCC)  Mild symptoms of fluid overload. May take extra bumex until back to baseline weight of ~218  Continue aldactone  BMP pending  Continue to stay off metolazone  Encouraged low sodium diet and daily weights    2. Coronary arteriosclerosis in native artery  Stable without angina  Recent MPS normal  Continue statin, ASA, BB, ranexa    3. Essential hypertension  Uncontrolled. Will likely increase lisinopril to 40mg if labs stable. Will call once we get BMP results  Continue to monitor at home  Encouraged weight loss    Keep FU with Dr. Jiménez and myself in November      ADDENDUM: Wife brought patient back to our office with symptoms of confusion, speech changes and imbalance.  She reports they were leaving our facility and patient stated that his \"brain and muscles were not talking to each " "other\". He wasn't sure where he was. Currently he is alert and oriented x3 with no neurological deficits but he appears lethargic and his eyes are closed during entire exam.  He notes a brief pain behind his neck to his head but this has now resolved.  Glucose is 348.  Vitals stable.  Labs stable except for hyperglycemia.  He appears weak and speech is somewhat garbled. Transported to ED by wife. Report called to Lula TAYLOR    It has been a pleasure to participate in the care of this patient.  Patient was instructed to call the [facility] with any questions, concerns, or worsening symptoms.    *Please note that portions of this note were completed with a voice recognition program. Efforts were made to edit the dictations, but occasionally words are mistranscribed.        "

## 2019-10-16 VITALS
SYSTOLIC BLOOD PRESSURE: 138 MMHG | OXYGEN SATURATION: 98 % | HEART RATE: 85 BPM | TEMPERATURE: 98.6 F | HEIGHT: 70 IN | RESPIRATION RATE: 18 BRPM | WEIGHT: 315 LBS | DIASTOLIC BLOOD PRESSURE: 88 MMHG | BODY MASS INDEX: 45.1 KG/M2

## 2019-10-22 ENCOUNTER — READMISSION MANAGEMENT (OUTPATIENT)
Dept: CALL CENTER | Facility: HOSPITAL | Age: 59
End: 2019-10-22

## 2019-10-22 NOTE — OUTREACH NOTE
CHF Week 4 Survey      Responses   Facility patient discharged from?  [place]   Does the patient have one of the following disease processes/diagnoses(primary or secondary)?  CHF   Week 4 attempt successful?  Yes   Call start time  1056   Call end time  1059   Discharge diagnosis  Chest pain/HUTCHINS/CHF   Person spoke with today (if not patient) and relationship  Mrs. Harding, spouse   Meds reviewed with patient/caregiver?  Yes   Is the patient taking all medications as directed (includes completed medication regime)?  Yes   Has the patient kept scheduled appointments due by today?  Yes   Is the patient still receiving Home Health Services?  N/A   What is the patient's perception of their health status since discharge?  Improving   Is the patient weighing daily?  Yes   Is the patient able to teach back Heart Failure Zones?  Yes [Wife reports green zone]   Week 4 Call Completed?  Yes   Would the patient like one additional call?  No   Graduated  Yes   Did the patient feel the follow up calls were helpful during their recovery period?  Yes   Was the number of calls appropriate?  Yes   Wrap up additional comments  Aware of Nurse Call Center. Number given to wife.          Myra Boyd RN

## 2019-11-04 ENCOUNTER — HOSPITAL ENCOUNTER (EMERGENCY)
Facility: HOSPITAL | Age: 59
Discharge: HOME | End: 2019-11-04
Attending: EMERGENCY MEDICINE | Admitting: EMERGENCY MEDICINE

## 2019-11-04 ENCOUNTER — APPOINTMENT (OUTPATIENT)
Dept: GENERAL RADIOLOGY | Facility: HOSPITAL | Age: 59
End: 2019-11-04

## 2019-11-04 VITALS
WEIGHT: 260 LBS | SYSTOLIC BLOOD PRESSURE: 121 MMHG | TEMPERATURE: 98.5 F | DIASTOLIC BLOOD PRESSURE: 63 MMHG | OXYGEN SATURATION: 97 % | HEART RATE: 101 BPM | BODY MASS INDEX: 37.22 KG/M2 | RESPIRATION RATE: 20 BRPM | HEIGHT: 70 IN

## 2019-11-04 DIAGNOSIS — J45.901 MODERATE ASTHMA WITH EXACERBATION, UNSPECIFIED WHETHER PERSISTENT: Primary | ICD-10-CM

## 2019-11-04 LAB
A-A DO2: 38.2 MMHG
ALBUMIN SERPL-MCNC: 4.2 G/DL (ref 3.5–5.2)
ALBUMIN/GLOB SERPL: 1.3 G/DL
ALP SERPL-CCNC: 84 U/L (ref 39–117)
ALT SERPL W P-5'-P-CCNC: 65 U/L (ref 1–41)
ANION GAP SERPL CALCULATED.3IONS-SCNC: 12.1 MMOL/L (ref 5–15)
ARTERIAL PATENCY WRIST A: POSITIVE
AST SERPL-CCNC: 57 U/L (ref 1–40)
ATMOSPHERIC PRESS: 736 MMHG
BASE EXCESS BLDA CALC-SCNC: 2.9 MMOL/L (ref 0–2)
BASOPHILS # BLD AUTO: 0.07 10*3/MM3 (ref 0–0.2)
BASOPHILS NFR BLD AUTO: 0.8 % (ref 0–1.5)
BDY SITE: ABNORMAL
BILIRUB SERPL-MCNC: 0.9 MG/DL (ref 0.2–1.2)
BUN BLD-MCNC: 19 MG/DL (ref 6–20)
BUN/CREAT SERPL: 18.4 (ref 7–25)
CALCIUM SPEC-SCNC: 9.8 MG/DL (ref 8.6–10.5)
CHLORIDE SERPL-SCNC: 101 MMOL/L (ref 98–107)
CO2 SERPL-SCNC: 25.9 MMOL/L (ref 22–29)
COHGB MFR BLD: 0.9 % (ref 0–2)
CREAT BLD-MCNC: 1.03 MG/DL (ref 0.76–1.27)
DEPRECATED RDW RBC AUTO: 40.6 FL (ref 37–54)
EOSINOPHIL # BLD AUTO: 0.27 10*3/MM3 (ref 0–0.4)
EOSINOPHIL NFR BLD AUTO: 3.1 % (ref 0.3–6.2)
ERYTHROCYTE [DISTWIDTH] IN BLOOD BY AUTOMATED COUNT: 14.4 % (ref 12.3–15.4)
FLUAV AG NPH QL: NEGATIVE
FLUBV AG NPH QL IA: NEGATIVE
GFR SERPL CREATININE-BSD FRML MDRD: 74 ML/MIN/1.73
GLOBULIN UR ELPH-MCNC: 3.2 GM/DL
GLUCOSE BLD-MCNC: 136 MG/DL (ref 65–99)
HCO3 BLDA-SCNC: 27.2 MMOL/L (ref 22–28)
HCT VFR BLD AUTO: 42.2 % (ref 37.5–51)
HCT VFR BLD CALC: 44.4 %
HGB BLD-MCNC: 13.8 G/DL (ref 13–17.7)
HGB BLDA-MCNC: 14.5 G/DL (ref 12–18)
HOLD SPECIMEN: NORMAL
HOLD SPECIMEN: NORMAL
HOROWITZ INDEX BLD+IHG-RTO: 21 %
IMM GRANULOCYTES # BLD AUTO: 0.1 10*3/MM3 (ref 0–0.05)
IMM GRANULOCYTES NFR BLD AUTO: 1.1 % (ref 0–0.5)
LIPASE SERPL-CCNC: 10 U/L (ref 13–60)
LYMPHOCYTES # BLD AUTO: 1.39 10*3/MM3 (ref 0.7–3.1)
LYMPHOCYTES NFR BLD AUTO: 15.9 % (ref 19.6–45.3)
MCH RBC QN AUTO: 25.7 PG (ref 26.6–33)
MCHC RBC AUTO-ENTMCNC: 32.7 G/DL (ref 31.5–35.7)
MCV RBC AUTO: 78.7 FL (ref 79–97)
METHGB BLD QL: 0.7 % (ref 0–1.5)
MODALITY: ABNORMAL
MONOCYTES # BLD AUTO: 0.47 10*3/MM3 (ref 0.1–0.9)
MONOCYTES NFR BLD AUTO: 5.4 % (ref 5–12)
NEUTROPHILS # BLD AUTO: 6.45 10*3/MM3 (ref 1.7–7)
NEUTROPHILS NFR BLD AUTO: 73.7 % (ref 42.7–76)
NOTE: ABNORMAL
NRBC BLD AUTO-RTO: 0 /100 WBC (ref 0–0.2)
NT-PROBNP SERPL-MCNC: 60.6 PG/ML (ref 5–900)
OXYHGB MFR BLDV: 90 % (ref 94–99)
PCO2 BLDA: 39.6 MM HG (ref 35–45)
PCO2 TEMP ADJ BLD: ABNORMAL MM[HG]
PH BLDA: 7.44 PH UNITS (ref 7.3–7.5)
PH, TEMP CORRECTED: ABNORMAL
PLATELET # BLD AUTO: 199 10*3/MM3 (ref 140–450)
PMV BLD AUTO: 10 FL (ref 6–12)
PO2 BLDA: 61.7 MM HG (ref 75–100)
PO2 TEMP ADJ BLD: ABNORMAL MM[HG]
POTASSIUM BLD-SCNC: 4.1 MMOL/L (ref 3.5–5.2)
PROT SERPL-MCNC: 7.4 G/DL (ref 6–8.5)
RBC # BLD AUTO: 5.36 10*6/MM3 (ref 4.14–5.8)
SAO2 % BLDCOA: 91.5 % (ref 94–100)
SODIUM BLD-SCNC: 139 MMOL/L (ref 136–145)
TROPONIN T SERPL-MCNC: 0.01 NG/ML (ref 0–0.03)
VENTILATOR MODE: ABNORMAL
WBC NRBC COR # BLD: 8.75 10*3/MM3 (ref 3.4–10.8)
WHOLE BLOOD HOLD SPECIMEN: NORMAL
WHOLE BLOOD HOLD SPECIMEN: NORMAL

## 2019-11-04 PROCEDURE — 93005 ELECTROCARDIOGRAM TRACING: CPT | Performed by: EMERGENCY MEDICINE

## 2019-11-04 PROCEDURE — 25010000002 METHYLPREDNISOLONE PER 125 MG: Performed by: PHYSICIAN ASSISTANT

## 2019-11-04 PROCEDURE — 85025 COMPLETE CBC W/AUTO DIFF WBC: CPT | Performed by: PHYSICIAN ASSISTANT

## 2019-11-04 PROCEDURE — 96365 THER/PROPH/DIAG IV INF INIT: CPT

## 2019-11-04 PROCEDURE — 87804 INFLUENZA ASSAY W/OPTIC: CPT | Performed by: PHYSICIAN ASSISTANT

## 2019-11-04 PROCEDURE — 82805 BLOOD GASES W/O2 SATURATION: CPT

## 2019-11-04 PROCEDURE — 93005 ELECTROCARDIOGRAM TRACING: CPT | Performed by: PHYSICIAN ASSISTANT

## 2019-11-04 PROCEDURE — 25010000002 MAGNESIUM SULFATE 2 GM/50ML SOLUTION: Performed by: PHYSICIAN ASSISTANT

## 2019-11-04 PROCEDURE — 99284 EMERGENCY DEPT VISIT MOD MDM: CPT

## 2019-11-04 PROCEDURE — 80053 COMPREHEN METABOLIC PANEL: CPT | Performed by: PHYSICIAN ASSISTANT

## 2019-11-04 PROCEDURE — 83880 ASSAY OF NATRIURETIC PEPTIDE: CPT | Performed by: PHYSICIAN ASSISTANT

## 2019-11-04 PROCEDURE — 94799 UNLISTED PULMONARY SVC/PX: CPT

## 2019-11-04 PROCEDURE — 94640 AIRWAY INHALATION TREATMENT: CPT

## 2019-11-04 PROCEDURE — 96375 TX/PRO/DX INJ NEW DRUG ADDON: CPT

## 2019-11-04 PROCEDURE — 84484 ASSAY OF TROPONIN QUANT: CPT | Performed by: PHYSICIAN ASSISTANT

## 2019-11-04 PROCEDURE — 36600 WITHDRAWAL OF ARTERIAL BLOOD: CPT

## 2019-11-04 PROCEDURE — 83050 HGB METHEMOGLOBIN QUAN: CPT

## 2019-11-04 PROCEDURE — 71046 X-RAY EXAM CHEST 2 VIEWS: CPT

## 2019-11-04 PROCEDURE — 82375 ASSAY CARBOXYHB QUANT: CPT

## 2019-11-04 PROCEDURE — 83690 ASSAY OF LIPASE: CPT | Performed by: PHYSICIAN ASSISTANT

## 2019-11-04 RX ORDER — METHYLPREDNISOLONE SODIUM SUCCINATE 125 MG/2ML
125 INJECTION, POWDER, LYOPHILIZED, FOR SOLUTION INTRAMUSCULAR; INTRAVENOUS ONCE
Status: COMPLETED | OUTPATIENT
Start: 2019-11-04 | End: 2019-11-04

## 2019-11-04 RX ORDER — PREDNISONE 50 MG/1
50 TABLET ORAL DAILY
Qty: 5 TABLET | Refills: 0 | Status: SHIPPED | OUTPATIENT
Start: 2019-11-04 | End: 2019-11-09

## 2019-11-04 RX ORDER — SODIUM CHLORIDE 0.9 % (FLUSH) 0.9 %
10 SYRINGE (ML) INJECTION AS NEEDED
Status: DISCONTINUED | OUTPATIENT
Start: 2019-11-04 | End: 2019-11-04

## 2019-11-04 RX ORDER — ALBUTEROL SULFATE 2.5 MG/3ML
2.5 SOLUTION RESPIRATORY (INHALATION) ONCE
Status: COMPLETED | OUTPATIENT
Start: 2019-11-04 | End: 2019-11-04

## 2019-11-04 RX ORDER — MAGNESIUM SULFATE HEPTAHYDRATE 40 MG/ML
2 INJECTION, SOLUTION INTRAVENOUS ONCE
Status: COMPLETED | OUTPATIENT
Start: 2019-11-04 | End: 2019-11-04

## 2019-11-04 RX ORDER — IPRATROPIUM BROMIDE AND ALBUTEROL SULFATE 2.5; .5 MG/3ML; MG/3ML
6 SOLUTION RESPIRATORY (INHALATION) ONCE
Status: COMPLETED | OUTPATIENT
Start: 2019-11-04 | End: 2019-11-04

## 2019-11-04 RX ORDER — SODIUM CHLORIDE 0.9 % (FLUSH) 0.9 %
10 SYRINGE (ML) INJECTION AS NEEDED
Status: DISCONTINUED | OUTPATIENT
Start: 2019-11-04 | End: 2019-11-04 | Stop reason: HOSPADM

## 2019-11-04 RX ADMIN — MAGNESIUM SULFATE HEPTAHYDRATE 2 G: 40 INJECTION, SOLUTION INTRAVENOUS at 16:31

## 2019-11-04 RX ADMIN — IPRATROPIUM BROMIDE AND ALBUTEROL SULFATE 6 ML: .5; 3 SOLUTION RESPIRATORY (INHALATION) at 15:03

## 2019-11-04 RX ADMIN — METHYLPREDNISOLONE SODIUM SUCCINATE 125 MG: 125 INJECTION, POWDER, FOR SOLUTION INTRAMUSCULAR; INTRAVENOUS at 15:01

## 2019-11-04 RX ADMIN — ALBUTEROL SULFATE 2.5 MG: 2.5 SOLUTION RESPIRATORY (INHALATION) at 16:58

## 2019-11-04 NOTE — ED PROVIDER NOTES
"Subjective   Patient is a 59-year-old male with a history of arthritis, asthma, anxiety, CHF, COPD, CAD status post CABG, diabetes, pretension, obstructive sleep apnea, seizures, and stroke presenting to the ER for chief complaint of \"asthma\".  Patient states that last night he had an \"asthma attack\".  States that upon awakening around 9 AM he had a chest tightness that he describes as midsternal and nonradiating.  He rates it a 5 out of 10.  He states \"it's asthma, I know.\" he states that he has been using his inhalers without much relief.  He is followed by Dr. Barajas.  He states he has had a productive cough with \"chunks of stuff\" but states he does not look at this mucus.  He states he also feels short of breath, does not wear oxygen at home.  He states he last saw Dr. Barajas last month.  States he has had nausea this morning, denies any emesis. Denies any dizziness, headache, difficulty swallowing, fever, chills, abdominal pain,, diarrhea, change in bowel movements, new or worsening leg pain or swelling.             Review of Systems   Constitutional: Negative for chills and fatigue.   HENT: Negative.    Eyes: Negative.    Respiratory: Positive for cough, chest tightness and shortness of breath.    Cardiovascular: Positive for chest pain.   Gastrointestinal: Positive for nausea. Negative for abdominal pain and vomiting.   Genitourinary: Negative.    Musculoskeletal: Negative.    Skin: Negative.    Allergic/Immunologic: Negative for immunocompromised state.   Neurological: Negative.    Psychiatric/Behavioral: Negative.        Past Medical History:   Diagnosis Date   • Anxiety    • Arthritis    • Asthma    • BiPAP (biphasic positive airway pressure) dependence    • Brachial neuritis 1/19/2017   • Chest pain    • CHF (congestive heart failure) (CMS/[facility])    • COPD (chronic obstructive pulmonary disease) (CMS/[facility])    • Coronary artery disease    • Depression    • Diabetes mellitus (CMS/[facility])    • Dizziness    • Edema  "   • GERD (gastroesophageal reflux disease)    • H/O chest x-ray 07/04/2015    Mild Left base atelectasis   • H/O echocardiogram 07/05/2015    Normal LVSF. EF of 60-65%. Grade 1 diastolic dysfunction of the LV myocardium. No evidence of pericardial effusion   • H/O exercise stress test    • History of herniated intervertebral disc     History of left L5-S1 disc herniation   • Hypertension    • LOM (loss of memory) 1/19/2017   • Lower back pain     Right   • Measles    • Obstructive sleep apnea    • Palpitations    • Pericardial effusion    • Renal disorder    • Seizure disorder (CMS/HCC)    • Seizures (CMS/HCC)     FOCAL    • Shortness of breath 1/19/2017   • SOB (shortness of breath)    • Stroke (CMS/HCC)    • Wears glasses        Allergies   Allergen Reactions   • Sulfa Antibiotics Anaphylaxis, Nausea And Vomiting and Delirium   • Codeine Nausea And Vomiting     Can only take with Phenergan   • Invokana [Canagliflozin]      DKA   • Morphine      Does not work. It causes pain       Past Surgical History:   Procedure Laterality Date   • BACK SURGERY     • CARDIAC CATHETERIZATION     • CARDIAC CATHETERIZATION N/A 8/11/2017    Procedure: Coronary angiography;  Surgeon: Dallas Kim MD;  Location: [facility] INVASIVE LOCATION;  Service:    • CARDIAC CATHETERIZATION N/A 8/11/2017    Procedure: Left Heart Cath;  Surgeon: Dallas Kim MD;  Location: [facility] INVASIVE LOCATION;  Service:    • CARDIAC CATHETERIZATION N/A 8/11/2017    Procedure: Left ventriculography;  Surgeon: Dallas Kim MD;  Location: [facility] INVASIVE LOCATION;  Service:    • CARDIOVASCULAR STRESS TEST  07/03/2017    WITH DR KIM AT [facility]   • CARPAL TUNNEL RELEASE Right    • CATARACT EXTRACTION W/ INTRAOCULAR LENS IMPLANT Right 6/12/2017    Procedure: CATARACT PHACO EXTRACTION WITH INTRAOCULAR LENS IMPLANT RIGHT ;  Surgeon: Natalie Cao MD;  Location: [facility];  Service:    • CATARACT EXTRACTION W/ INTRAOCULAR LENS IMPLANT Left  7/10/2017    Procedure: CATARACT PHACO EXTRACTION WITH INTRAOCULAR LENS IMPLANT LEFT;  Surgeon: Natalie Cao MD;  Location:  [facility];  Service:    • CHOLECYSTECTOMY     • CHOLECYSTECTOMY     • COLONOSCOPY     • CORONARY ANGIOPLASTY WITH STENT PLACEMENT     • CORONARY ANGIOPLASTY WITH STENT PLACEMENT      X1-LAD   • CORONARY ARTERY BYPASS GRAFT N/A 8/15/2017    Procedure: CORONARY ARTERY BYPASS GRAFTING x 3 UTILIZING THE LEFT INTERNAL MAMMARY ARTERY WITH ENDOSCOPIC VEIN HARVESTING OF THE RIGHT GREATER SAPHENOUS VEIN, HAMLET, LAD ENDARECTOMY;  Surgeon: London Damon MD;  Location:  [facility];  Service:    • ENDOSCOPY     • EYE SURGERY      cataract surgery both eyes   • KNEE ARTHROSCOPY Bilateral    • KNEE ARTHROSCOPY Right 2010    Dr Lewis   • KNEE ARTHROSCOPY Left 2008    Dr Jameson   • NEUROPLASTY / TRANSPOSITION ULNAR NERVE AT ELBOW     • OTHER SURGICAL HISTORY      Foraminotomy and discectomy   • PERICARDIAL WINDOW N/A 8/25/2017    Procedure: PERICARDIAL WINDOW;  Surgeon: Freeman Phillips MD;  Location:  [facility];  Service:        Family History   Problem Relation Age of Onset   • Hypertension Mother    • Arthritis Mother    • Alcohol abuse Father    • Heart disease Other    • Stroke Other    • Hypertension Other    • Other Other         Neurologic disorder   • Parkinsonism Other    • Stroke Other    • Heart disease Other    • Hypertension Other    • Heart disease Other    • Stroke Other    • Hypertension Other        Social History     Socioeconomic History   • Marital status:      Spouse name: Not on file   • Number of children: 1   • Years of education: Not on file   • Highest education level: Not on file   Occupational History   • Occupation: [employer]     Employer: DISABLED     Comment: Disabled since 2002-Back Problems   Tobacco Use   • Smoking status: Former Smoker     Packs/day: 1.00     Years: 10.00     Pack years: 10.00     Types: Cigarettes     Last attempt to quit: 1/1/1998      Years since quittin.8   • Smokeless tobacco: Former User     Types: Snuff     Quit date:    Substance and Sexual Activity   • Alcohol use: Yes     Frequency: Monthly or less     Drinks per session: 3 or 4     Comment: 3 PER YEAR   • Drug use: No   • Sexual activity: Defer   Social History Narrative    Caffeine use: 0-1 serving daily.    Patient lives at home with wife.            Objective   Physical Exam   Nursing note and vitals reviewed.    GEN: Patient is obese, sitting upright in stretcher.  Awake and alert.  Answering questions appropriately.  No acute distress  Head: Normocephalic, atraumatic  Eyes: EOM intact  Chest: Nontender to palpation  Cardiovascular: Regular rate and rhythm   Lungs: Breathing is even and nonlabored.  There is poor air movement throughout all fields with inspiration, no obvious rales or wheezing appreciated  Abdomen: Very large, nondistended.  Soft, nontender to palpation  Extremities: She does have 1+ bilateral pitting edema, full range of motion without deficits.  Radial pulses are 2+.  Dorsalis pedis pulses are 1+ and equal  Neuro: GCS 15.   Psych: Mood and affect are appropriate    Procedures           ED Course  ED Course as of 36   Mon 2019   1534 EKG interpreted by me.  Sinus rhythm.  Rate of 73.  No ST segment or T wave abnormalities.  Normal EKG.  [CG]   1554 Glucose: (!) 136 [LA]   1554 Creatinine: 1.03 [LA]   1554 Sodium: 139 [LA]   1554 Potassium: 4.1 [LA]   1554 Chloride: 101 [LA]   1554 Albumin: 4.20 [LA]   1554 ALT (SGPT): (!) 65 [LA]   1554 AST (SGOT): (!) 57 [LA]   1554 Alkaline Phosphatase: 84 [LA]   1554 Troponin T: 0.012 [LA]   1554 Lipase: (!) 10 [LA]   1554 proBNP: 60.6 [LA]   1554 pH, Arterial: 7.444 [LA]   1554 pO2, Arterial: (!) 61.7 [LA]   1554 pCO2, Arterial: 39.6 [LA]   1612 Patient still has poor air movement on re-evaluation, denies chest pain  [LA]   1633 Reviewed lab work with Dr. Avitia. Will give additional albuterol  nebulizers.  [LA]   1742 Reviewed x-ray with Dr. Avitia.  No acute cardiopulmonary abnormality.  Patient has better air movement on auscultation.  He states this feels somewhat better and has less chest tightness.  Discussed admission versus outpatient treatment of what is likely an asthma exacerbation but patient states he would prefer to go home and take his medication, follow-up with his pulmonologist and return if things worsen.  He is in no respiratory distress, no hypoxia.  Dr. Avitia is in agreement with this plan as well.  [LA]      ED Course User Index  [CG] Deep Avitia,   [LA] Viviana Martinez PA-C                  MDM  Number of Diagnoses or Management Options  Moderate asthma with exacerbation, unspecified whether persistent:   Diagnosis management comments: On arrival, patient is afebrile, normotensive, no tachycardia or hypoxia.  He does not appear to be in respiratory distress.  Differential includes viral illness, pneumonia, cardiac arrhythmia, CHF exacerbation, ACS, pneumothorax, asthma exacerbation, and other concerns.  Low concern for aortic dissection. Low concern for PE; no tachycardia or hypoxia. Will obtain ABG, troponin, basic labs, lipase, EKG, chest x-ray.  We will give duo nebs and IV steroids.    EKG was interpreted by Dr. Avitia.  Patient had no leukocytosis or anemia on his CBC.  Glucose mildly elevated on his CMP, no other significant electrolyte abnormalities.  Troponin negative.  proBNP was only 60.  Influenza negative.  His blood gas was stable.  Review chest x-ray with Dr. Avitia, no acute cardiopulmonary abnormalities were appreciated.  Patient was still having chest tightness after the IV steroids and DuoNeb's.  I gave him 2 g of magnesium, albuterol inhaler.  His vital signs have remained stable here, no hypoxia.  He is in no respiratory distress.  Patient had better air movement after albuterol and magnesium.  He states he is feeling a bit better.  Reviewed the case  with Dr. Avitia and he is in agreement the patient can be discharged home with close follow-up. Discussed inpatient for nebs and steroids versus outpatient management and patient preferred to go home at this time.  I believe this is okay given that his labs are stable and his vital signs have been stable as well.  I will give him a short dose of prednisone.  He already has albuterol inhaler at home he can use.  I advised that he call Dr. Barajas's office first thing in the morning and also follow-up with his primary care provider.  We discussed very strict return precautions.       Amount and/or Complexity of Data Reviewed  Clinical lab tests: reviewed and ordered  Tests in the radiology section of CPT®: reviewed and ordered  Discussion of test results with the performing providers: yes  Decide to obtain previous medical records or to obtain history from someone other than the patient: yes  Review and summarize past medical records: yes  Discuss the patient with other providers: yes  Independent visualization of images, tracings, or specimens: yes    Risk of Complications, Morbidity, and/or Mortality  Presenting problems: moderate  Diagnostic procedures: moderate  Management options: moderate    Patient Progress  Patient progress: improved      Final diagnoses:   Moderate asthma with exacerbation, unspecified whether persistent              Viviana Martinez PA-C  11/05/19 0036

## 2019-11-04 NOTE — DISCHARGE INSTRUCTIONS
Your symptoms are consistent with an asthma exacerbation.  You should continue your home medications and nebulizers as directed by your pulmonologist.  You may use your albuterol inhaler 2 puffs every 4 hours for the next 1 to 2 days to help with chest tightness in addition to your other nebulizers.  May use over-the-counter expectorant such as Mucinex as directed on the box.  Drink plenty of water to help thin mucus. Take steroid burst as directed to help with inflammation but monitor your blood sugar closely.  You need to call Dr. Barajas first thing in the morning for earliest available appointment.  When he felt your primary care provider as directed.  You need to return to the ER for any change, worsening symptoms, or any additional concerns include not limited to severe worsening shortness of breath, chest pain, dizziness, syncope.

## 2019-11-11 RX ORDER — FLUNISOLIDE 0.25 MG/ML
SOLUTION NASAL
Qty: 50 ML | Refills: 0 | Status: SHIPPED | OUTPATIENT
Start: 2019-11-11 | End: 2019-12-11 | Stop reason: SDUPTHER

## 2019-11-25 ENCOUNTER — HOSPITAL ENCOUNTER (OUTPATIENT)
Facility: HOSPITAL | Age: 59
Setting detail: HOSPITAL OUTPATIENT SURGERY
Discharge: HOME | End: 2019-11-25
Attending: OPHTHALMOLOGY | Admitting: OPHTHALMOLOGY

## 2019-11-25 VITALS
DIASTOLIC BLOOD PRESSURE: 70 MMHG | RESPIRATION RATE: 18 BRPM | HEART RATE: 76 BPM | TEMPERATURE: 99.1 F | SYSTOLIC BLOOD PRESSURE: 127 MMHG | OXYGEN SATURATION: 94 %

## 2019-11-25 RX ORDER — BALANCED SALT SOLUTION 6.4; .75; .48; .3; 3.9; 1.7 MG/ML; MG/ML; MG/ML; MG/ML; MG/ML; MG/ML
SOLUTION OPHTHALMIC AS NEEDED
Status: DISCONTINUED | OUTPATIENT
Start: 2019-11-25 | End: 2019-11-25 | Stop reason: HOSPADM

## 2019-11-25 RX ORDER — TETRACAINE HYDROCHLORIDE 5 MG/ML
SOLUTION OPHTHALMIC AS NEEDED
Status: DISCONTINUED | OUTPATIENT
Start: 2019-11-25 | End: 2019-11-25 | Stop reason: HOSPADM

## 2019-11-25 RX ORDER — PREDNISOLONE ACETATE 10 MG/ML
1 SUSPENSION/ DROPS OPHTHALMIC SEE ADMIN INSTRUCTIONS
Status: DISCONTINUED | OUTPATIENT
Start: 2019-11-25 | End: 2019-11-25 | Stop reason: HOSPADM

## 2019-11-25 RX ORDER — PREDNISOLONE ACETATE 10 MG/ML
SUSPENSION/ DROPS OPHTHALMIC AS NEEDED
Status: DISCONTINUED | OUTPATIENT
Start: 2019-11-25 | End: 2019-11-25 | Stop reason: HOSPADM

## 2019-11-25 RX ORDER — TETRACAINE HYDROCHLORIDE 5 MG/ML
1 SOLUTION OPHTHALMIC ONCE
Status: DISCONTINUED | OUTPATIENT
Start: 2019-11-25 | End: 2019-11-25 | Stop reason: HOSPADM

## 2019-11-26 ENCOUNTER — NURSE TRIAGE (OUTPATIENT)
Dept: CALL CENTER | Facility: HOSPITAL | Age: 59
End: 2019-11-26

## 2019-11-26 NOTE — TELEPHONE ENCOUNTER
"He has hx of sepsis, and was exposed to Strep from a wound I have, his wife states, today hs been vomiting for several hours and has started having chest pain, has had bypass surgery in past. No fever, sent to ER    Reason for Disposition  • Patient sounds very sick or weak to the triager    Additional Information  • Negative: Severe difficulty breathing (e.g., struggling for each breath, speaks in single words)  • Negative: Difficult to awaken or acting confused (e.g., disoriented, slurred speech)  • Negative: Shock suspected (e.g., cold/pale/clammy skin, too weak to stand, low BP, rapid pulse)  • Negative: [1] Chest pain lasts > 5 minutes AND [2] history of heart disease  (i.e., heart attack, bypass surgery, angina, angioplasty, CHF; not just a heart murmur)  • Negative: [1] Chest pain lasts > 5 minutes AND [2] described as crushing, pressure-like, or heavy  • Negative: [1] Chest pain lasts > 5 minutes AND [2] age > 50  • Negative: [1] Chest pain lasts > 5 minutes AND [2] age > 30 AND [3] at least one cardiac risk factor (i.e., hypertension, diabetes, obesity, smoker or strong family history of heart disease)  • Negative: [1] Chest pain lasts > 5 minutes AND [2] not relieved with nitroglycerin  • Negative: Passed out (i.e., lost consciousness, collapsed and was not responding)  • Negative: Heart beating < 50 beats per minute OR > 140 beats per minute  • Negative: Visible sweat on face or sweat dripping down face  • Negative: Sounds like a life-threatening emergency to the triager  • Negative: Followed a chest injury  • Negative: SEVERE chest pain  • Negative: [1] Intermittent  chest pain or \"angina\" AND [2] increasing in severity or frequency  (Exception: pains lasting a few seconds)  • Negative: Pain also present in shoulder(s) or arm(s) or jaw  (Exception: pain is clearly made worse by movement)  • Negative: Difficulty breathing  • Negative: Dizziness or lightheadedness  • Negative: Coughing up blood  • " "Negative: Cocaine use within last 3 days  • Negative: History of prior \"blood clot\" in leg or lungs (i.e., deep vein thrombosis, pulmonary embolism)  • Negative: Recent illness requiring prolonged bedrest (i.e., immobilization)  • Negative: Hip or leg fracture in past 2 months (e.g., had cast on leg or ankle)  • Negative: Major surgery in the past month  • Negative: Recent long-distance travel with prolonged time in car, bus, plane, or train (i.e., within past 2 weeks; 6 or  more hours duration)  • Negative: Chest pain lasts > 5 minutes (Exceptions: chest pain occurring > 3 days ago and now asymptomatic; same as previously diagnosed heartburn and has accompanying sour taste in mouth)  • Negative: Taking a deep breath makes pain worse    Answer Assessment - Initial Assessment Questions  1. LOCATION: \"Where does it hurt?\"        Mid chest  2. RADIATION: \"Does the pain go anywhere else?\" (e.g., into neck, jaw, arms, back)      no  3. ONSET: \"When did the chest pain begin?\" (Minutes, hours or days)       Couple hours ago with vomiting  4. PATTERN \"Does the pain come and go, or has it been constant since it started?\"  \"Does it get worse with exertion?\"       Comes and goes  5. DURATION: \"How long does it last\" (e.g., seconds, minutes, hours)      On and off few minutes  6. SEVERITY: \"How bad is the pain?\"  (e.g., Scale 1-10; mild, moderate, or severe)     - MILD (1-3): doesn't interfere with normal activities      - MODERATE (4-7): interferes with normal activities or awakens from sleep     - SEVERE (8-10): excruciating pain, unable to do any normal activities        moderate  7. CARDIAC RISK FACTORS: \"Do you have any history of heart problems or risk factors for heart disease?\" (e.g., prior heart attack, angina; high blood pressure, diabetes, being overweight, high cholesterol, smoking, or strong family history of heart disease)      Has hx bypass in past  8. PULMONARY RISK FACTORS: \"Do you have any history of lung " "disease?\"  (e.g., blood clots in lung, asthma, emphysema, birth control pills)      no  9. CAUSE: \"What do you think is causing the chest pain?\"      unknown  10. OTHER SYMPTOMS: \"Do you have any other symptoms?\" (e.g., dizziness, nausea, vomiting, sweating, fever, difficulty breathing, cough)        Vomiting, sweating  11. PREGNANCY: \"Is there any chance you are pregnant?\" \"When was your last menstrual period?\"        no    Protocols used: CHEST PAIN-ADULT-AH      "

## 2019-11-27 ENCOUNTER — NURSE TRIAGE (OUTPATIENT)
Dept: CALL CENTER | Facility: HOSPITAL | Age: 59
End: 2019-11-27

## 2019-11-27 NOTE — TELEPHONE ENCOUNTER
"Caller states katarina's blood sugar is 457, has had diarrhea and was seen in the ED yesterday. He is thirsty and urine is dark    Reason for Disposition  • Patient sounds very sick or weak to the triager    Additional Information  • Negative: Unconscious or difficult to awaken  • Negative: Acting confused (e.g., disoriented, slurred speech)  • Negative: Very weak (e.g., can't stand)  • Negative: Sounds like a life-threatening emergency to the triager  • Negative: [1] Vomiting AND [2] signs of dehydration (e.g., very dry mouth, lightheaded, etc.)  • Negative: [1] Blood glucose > 240 mg/dl (13 mmol/l) AND [2] rapid breathing  • Negative: Blood glucose > 500 mg/dl (27.5 mmol/l)  • Negative: [1] Blood glucose > 240 mg/dl (13 mmol/l) AND [2] urine ketones moderate-large (or more than 1+)  • Negative: [1] Blood glucose > 240 mg/dl (13 mmol/l) AND [2] blood ketones > 1.5 mmol/l  • Negative: [1] Blood glucose > 240 mg/dl (13 mmol/l) AND [2] vomiting AND [3] unable to check for ketones (in blood or urine)  • Negative: [1] New onset Diabetes suspected (e.g., frequent urination, weak, weight loss) AND [2] vomiting or rapid breathing  • Negative: Vomiting lasts > 4 hours    Answer Assessment - Initial Assessment Questions  1. BLOOD GLUCOSE: \"What is your blood glucose level?\"       457  2. ONSET: \"When did you check the blood glucose?\"    today  3. USUAL RANGE: \"What is your glucose level usually?\" (e.g., usual fasting morning value, usual evening value)      [id number]. KETONES: \"Do you check for ketones (urine or blood test strips)?\" If yes, ask: \"What does the test show now?\"       no  5. TYPE 1 or 2:  \"Do you know what type of diabetes you have?\"  (e.g., Type 1, Type 2, Gestational; doesn't know)       ty  6. INSULIN: \"Do you take insulin?\" If yes, ask: \"Have you missed any shots recently?\"      no  7. DIABETES PILLS: \"Do you take any pills for your diabetes?\" If yes, ask: \"Have you missed taking any pills recently?\"      " "insuling   8. OTHER SYMPTOMS: \"Do you have any symptoms?\" (e.g., fever, frequent urination, difficulty breathing, dizziness, weakness, vomiting)      Diarrhea, thristy  9. PREGNANCY: \"Is there any chance you are pregnant?\" \"When was your last menstrual period?\"      na    Protocols used: DIABETES - HIGH BLOOD SUGAR-ADULT-AH      "

## 2019-11-30 ENCOUNTER — HOSPITAL ENCOUNTER (EMERGENCY)
Facility: HOSPITAL | Age: 59
Discharge: HOME | End: 2019-11-30
Attending: EMERGENCY MEDICINE | Admitting: EMERGENCY MEDICINE

## 2019-11-30 ENCOUNTER — APPOINTMENT (OUTPATIENT)
Dept: CT IMAGING | Facility: HOSPITAL | Age: 59
End: 2019-11-30

## 2019-11-30 ENCOUNTER — APPOINTMENT (OUTPATIENT)
Dept: GENERAL RADIOLOGY | Facility: HOSPITAL | Age: 59
End: 2019-11-30

## 2019-11-30 VITALS
OXYGEN SATURATION: 92 % | HEIGHT: 70 IN | BODY MASS INDEX: 44.38 KG/M2 | HEART RATE: 79 BPM | RESPIRATION RATE: 16 BRPM | DIASTOLIC BLOOD PRESSURE: 75 MMHG | WEIGHT: 310 LBS | SYSTOLIC BLOOD PRESSURE: 145 MMHG | TEMPERATURE: 99.2 F

## 2019-11-30 DIAGNOSIS — R10.9 ABDOMINAL PAIN, UNSPECIFIED ABDOMINAL LOCATION: ICD-10-CM

## 2019-11-30 DIAGNOSIS — E11.65 POORLY CONTROLLED DIABETES MELLITUS (HCC): Primary | ICD-10-CM

## 2019-11-30 DIAGNOSIS — R73.9 HYPERGLYCEMIA: ICD-10-CM

## 2019-11-30 LAB
ALBUMIN SERPL-MCNC: 3.7 G/DL (ref 3.5–5.2)
ALBUMIN/GLOB SERPL: 1.3 G/DL
ALP SERPL-CCNC: 74 U/L (ref 39–117)
ALT SERPL W P-5'-P-CCNC: 58 U/L (ref 1–41)
ANION GAP SERPL CALCULATED.3IONS-SCNC: 13 MMOL/L (ref 5–15)
AST SERPL-CCNC: 39 U/L (ref 1–40)
B-OH-BUTYR SERPL-SCNC: 0.13 MMOL/L (ref 0.02–0.27)
BACTERIA UR QL AUTO: NORMAL /HPF
BASOPHILS # BLD AUTO: 0.03 10*3/MM3 (ref 0–0.2)
BASOPHILS NFR BLD AUTO: 0.6 % (ref 0–1.5)
BILIRUB SERPL-MCNC: 0.7 MG/DL (ref 0.2–1.2)
BILIRUB UR QL STRIP: NEGATIVE
BUN BLD-MCNC: 6 MG/DL (ref 6–20)
BUN/CREAT SERPL: 6.9 (ref 7–25)
CALCIUM SPEC-SCNC: 9.3 MG/DL (ref 8.6–10.5)
CHLORIDE SERPL-SCNC: 96 MMOL/L (ref 98–107)
CLARITY UR: CLEAR
CO2 SERPL-SCNC: 27 MMOL/L (ref 22–29)
COLOR UR: YELLOW
CREAT BLD-MCNC: 0.87 MG/DL (ref 0.76–1.27)
D-LACTATE SERPL-SCNC: 1.9 MMOL/L (ref 0.5–2)
DEPRECATED RDW RBC AUTO: 43.4 FL (ref 37–54)
EOSINOPHIL # BLD AUTO: 0.26 10*3/MM3 (ref 0–0.4)
EOSINOPHIL NFR BLD AUTO: 5.2 % (ref 0.3–6.2)
ERYTHROCYTE [DISTWIDTH] IN BLOOD BY AUTOMATED COUNT: 15.2 % (ref 12.3–15.4)
GFR SERPL CREATININE-BSD FRML MDRD: 90 ML/MIN/1.73
GLOBULIN UR ELPH-MCNC: 2.8 GM/DL
GLUCOSE BLD-MCNC: 307 MG/DL (ref 65–99)
GLUCOSE BLDC GLUCOMTR-MCNC: 226 MG/DL (ref 70–130)
GLUCOSE UR STRIP-MCNC: ABNORMAL MG/DL
HBA1C MFR BLD: 11.8 % (ref 4.8–5.6)
HCT VFR BLD AUTO: 41.4 % (ref 37.5–51)
HGB BLD-MCNC: 13.6 G/DL (ref 13–17.7)
HGB UR QL STRIP.AUTO: NEGATIVE
HOLD SPECIMEN: NORMAL
HOLD SPECIMEN: NORMAL
HYALINE CASTS UR QL AUTO: NORMAL /LPF
IMM GRANULOCYTES # BLD AUTO: 0.06 10*3/MM3 (ref 0–0.05)
IMM GRANULOCYTES NFR BLD AUTO: 1.2 % (ref 0–0.5)
KETONES UR QL STRIP: NEGATIVE
LEUKOCYTE ESTERASE UR QL STRIP.AUTO: ABNORMAL
LIPASE SERPL-CCNC: 9 U/L (ref 13–60)
LYMPHOCYTES # BLD AUTO: 1.29 10*3/MM3 (ref 0.7–3.1)
LYMPHOCYTES NFR BLD AUTO: 25.9 % (ref 19.6–45.3)
MCH RBC QN AUTO: 26.1 PG (ref 26.6–33)
MCHC RBC AUTO-ENTMCNC: 32.9 G/DL (ref 31.5–35.7)
MCV RBC AUTO: 79.3 FL (ref 79–97)
MONOCYTES # BLD AUTO: 0.29 10*3/MM3 (ref 0.1–0.9)
MONOCYTES NFR BLD AUTO: 5.8 % (ref 5–12)
NEUTROPHILS # BLD AUTO: 3.06 10*3/MM3 (ref 1.7–7)
NEUTROPHILS NFR BLD AUTO: 61.3 % (ref 42.7–76)
NITRITE UR QL STRIP: NEGATIVE
NRBC BLD AUTO-RTO: 0 /100 WBC (ref 0–0.2)
PH UR STRIP.AUTO: <=5 [PH] (ref 5–8)
PLATELET # BLD AUTO: 175 10*3/MM3 (ref 140–450)
PMV BLD AUTO: 9.8 FL (ref 6–12)
POTASSIUM BLD-SCNC: 3.9 MMOL/L (ref 3.5–5.2)
PROT SERPL-MCNC: 6.5 G/DL (ref 6–8.5)
PROT UR QL STRIP: NEGATIVE
RBC # BLD AUTO: 5.22 10*6/MM3 (ref 4.14–5.8)
RBC # UR: NORMAL /HPF
REF LAB TEST METHOD: NORMAL
SODIUM BLD-SCNC: 136 MMOL/L (ref 136–145)
SP GR UR STRIP: 1.01 (ref 1–1.03)
SQUAMOUS #/AREA URNS HPF: NORMAL /HPF
UROBILINOGEN UR QL STRIP: ABNORMAL
WBC NRBC COR # BLD: 4.99 10*3/MM3 (ref 3.4–10.8)
WBC UR QL AUTO: NORMAL /HPF
WHOLE BLOOD HOLD SPECIMEN: NORMAL
WHOLE BLOOD HOLD SPECIMEN: NORMAL

## 2019-11-30 PROCEDURE — 80053 COMPREHEN METABOLIC PANEL: CPT | Performed by: EMERGENCY MEDICINE

## 2019-11-30 PROCEDURE — 83690 ASSAY OF LIPASE: CPT | Performed by: EMERGENCY MEDICINE

## 2019-11-30 PROCEDURE — 96375 TX/PRO/DX INJ NEW DRUG ADDON: CPT

## 2019-11-30 PROCEDURE — 25010000002 ONDANSETRON PER 1 MG: Performed by: PHYSICIAN ASSISTANT

## 2019-11-30 PROCEDURE — 25010000002 KETOROLAC TROMETHAMINE PER 15 MG: Performed by: PHYSICIAN ASSISTANT

## 2019-11-30 PROCEDURE — 74176 CT ABD & PELVIS W/O CONTRAST: CPT

## 2019-11-30 PROCEDURE — 85025 COMPLETE CBC W/AUTO DIFF WBC: CPT | Performed by: EMERGENCY MEDICINE

## 2019-11-30 PROCEDURE — 82010 KETONE BODYS QUAN: CPT | Performed by: PHYSICIAN ASSISTANT

## 2019-11-30 PROCEDURE — 71046 X-RAY EXAM CHEST 2 VIEWS: CPT

## 2019-11-30 PROCEDURE — 81001 URINALYSIS AUTO W/SCOPE: CPT | Performed by: EMERGENCY MEDICINE

## 2019-11-30 PROCEDURE — 83036 HEMOGLOBIN GLYCOSYLATED A1C: CPT | Performed by: PHYSICIAN ASSISTANT

## 2019-11-30 PROCEDURE — 82962 GLUCOSE BLOOD TEST: CPT

## 2019-11-30 PROCEDURE — 96374 THER/PROPH/DIAG INJ IV PUSH: CPT

## 2019-11-30 PROCEDURE — 99283 EMERGENCY DEPT VISIT LOW MDM: CPT

## 2019-11-30 PROCEDURE — 83605 ASSAY OF LACTIC ACID: CPT | Performed by: EMERGENCY MEDICINE

## 2019-11-30 RX ORDER — SODIUM CHLORIDE 0.9 % (FLUSH) 0.9 %
10 SYRINGE (ML) INJECTION AS NEEDED
Status: DISCONTINUED | OUTPATIENT
Start: 2019-11-30 | End: 2019-12-01 | Stop reason: HOSPADM

## 2019-11-30 RX ORDER — PROMETHAZINE HYDROCHLORIDE 25 MG/1
25 TABLET ORAL EVERY 6 HOURS PRN
Qty: 20 TABLET | Refills: 0 | Status: SHIPPED | OUTPATIENT
Start: 2019-11-30 | End: 2021-12-10 | Stop reason: SDUPTHER

## 2019-11-30 RX ORDER — ONDANSETRON 2 MG/ML
4 INJECTION INTRAMUSCULAR; INTRAVENOUS ONCE
Status: COMPLETED | OUTPATIENT
Start: 2019-11-30 | End: 2019-11-30

## 2019-11-30 RX ORDER — KETOROLAC TROMETHAMINE 15 MG/ML
10 INJECTION, SOLUTION INTRAMUSCULAR; INTRAVENOUS ONCE
Status: COMPLETED | OUTPATIENT
Start: 2019-11-30 | End: 2019-11-30

## 2019-11-30 RX ORDER — TRAMADOL HYDROCHLORIDE 50 MG/1
50 TABLET ORAL ONCE
Status: DISCONTINUED | OUTPATIENT
Start: 2019-11-30 | End: 2019-12-01 | Stop reason: HOSPADM

## 2019-11-30 RX ADMIN — KETOROLAC TROMETHAMINE 10 MG: 15 INJECTION, SOLUTION INTRAMUSCULAR; INTRAVENOUS at 21:02

## 2019-11-30 RX ADMIN — ONDANSETRON 4 MG: 2 INJECTION INTRAMUSCULAR; INTRAVENOUS at 21:01

## 2019-11-30 RX ADMIN — SODIUM CHLORIDE 1000 ML: 9 INJECTION, SOLUTION INTRAVENOUS at 19:43

## 2019-12-01 NOTE — DISCHARGE INSTRUCTIONS
[place] diet for the next few days, and increase clear fluid intake.  Watch your blood sugar closely.  Recheck in 2 days with your primary care provider.  Return to the emergency department immediately if any change or worsening of symptoms.

## 2019-12-09 ENCOUNTER — HOSPITAL ENCOUNTER (OUTPATIENT)
Facility: HOSPITAL | Age: 59
Setting detail: HOSPITAL OUTPATIENT SURGERY
Discharge: HOME | End: 2019-12-09
Attending: OPHTHALMOLOGY | Admitting: OPHTHALMOLOGY

## 2019-12-09 ENCOUNTER — OFFICE VISIT (OUTPATIENT)
Dept: PULMONOLOGY | Facility: CLINIC | Age: 59
End: 2019-12-09

## 2019-12-09 VITALS
RESPIRATION RATE: 18 BRPM | OXYGEN SATURATION: 94 % | BODY MASS INDEX: 44.38 KG/M2 | WEIGHT: 310 LBS | HEART RATE: 89 BPM | DIASTOLIC BLOOD PRESSURE: 76 MMHG | HEIGHT: 70 IN | SYSTOLIC BLOOD PRESSURE: 124 MMHG | TEMPERATURE: 97.7 F

## 2019-12-09 VITALS
SYSTOLIC BLOOD PRESSURE: 142 MMHG | RESPIRATION RATE: 18 BRPM | BODY MASS INDEX: 45.1 KG/M2 | HEIGHT: 70 IN | OXYGEN SATURATION: 95 % | HEART RATE: 92 BPM | DIASTOLIC BLOOD PRESSURE: 82 MMHG | WEIGHT: 315 LBS

## 2019-12-09 DIAGNOSIS — E66.01 MORBID OBESITY, UNSPECIFIED OBESITY TYPE (HCC): ICD-10-CM

## 2019-12-09 DIAGNOSIS — J45.50 SEVERE PERSISTENT ASTHMA WITHOUT COMPLICATION: Primary | ICD-10-CM

## 2019-12-09 DIAGNOSIS — J30.89 OTHER ALLERGIC RHINITIS: ICD-10-CM

## 2019-12-09 DIAGNOSIS — G47.33 OSA (OBSTRUCTIVE SLEEP APNEA): ICD-10-CM

## 2019-12-09 PROCEDURE — 99214 OFFICE O/P EST MOD 30 MIN: CPT | Performed by: NURSE PRACTITIONER

## 2019-12-09 RX ORDER — TETRACAINE HYDROCHLORIDE 5 MG/ML
SOLUTION OPHTHALMIC AS NEEDED
Status: DISCONTINUED | OUTPATIENT
Start: 2019-12-09 | End: 2019-12-09 | Stop reason: HOSPADM

## 2019-12-09 RX ORDER — BALANCED SALT SOLUTION 6.4; .75; .48; .3; 3.9; 1.7 MG/ML; MG/ML; MG/ML; MG/ML; MG/ML; MG/ML
SOLUTION OPHTHALMIC AS NEEDED
Status: DISCONTINUED | OUTPATIENT
Start: 2019-12-09 | End: 2019-12-09 | Stop reason: HOSPADM

## 2019-12-09 RX ORDER — PREDNISOLONE ACETATE 10 MG/ML
1 SUSPENSION/ DROPS OPHTHALMIC SEE ADMIN INSTRUCTIONS
Status: DISCONTINUED | OUTPATIENT
Start: 2019-12-09 | End: 2019-12-09 | Stop reason: HOSPADM

## 2019-12-09 RX ORDER — PREDNISOLONE ACETATE 10 MG/ML
SUSPENSION/ DROPS OPHTHALMIC AS NEEDED
Status: DISCONTINUED | OUTPATIENT
Start: 2019-12-09 | End: 2019-12-09 | Stop reason: HOSPADM

## 2019-12-09 RX ORDER — TETRACAINE HYDROCHLORIDE 5 MG/ML
1 SOLUTION OPHTHALMIC ONCE
Status: DISCONTINUED | OUTPATIENT
Start: 2019-12-09 | End: 2019-12-09 | Stop reason: HOSPADM

## 2019-12-09 NOTE — OP NOTE
Pre-op Diagnosis: Posterior Capsular Opacification, Left eye  Procedure: Nd: YAG Laser Capsulotomy, Left eye    Post-op Diagnosis: Posterior Capsular Opacification, Left eye    Indications:  Denzel Harding is a 59 y.o. male with Posterior Capsular Opacification. It was recommended that the next step in his management be a Nd:YAG laser capsulotomy in the left eye.      Procedure: The left eye was dilated prior to arrival in the laser suite.  A drop of topical Tetracaine was instilled in the left conjunctival cul-de-sac and the patient was then positioned in front of the Nd:YAG laser. The correct eye was again confirmed verbally by both the patient and the surgeon.  The laser contact lens was positioned on the left eye and the cruciate capsulotomy was carried out without difficulty using the following parameters:    Laser: Nd:YAG  Spot Size: Fixed  Burst Mode: I  Power Settin.1mJ/burst  Number of shots: 42  Total energy delivered: 92 mJ    Immediately following the procedure, the left eye was rinsed with BSS solution and a drop of prednisolone acetate 1% was applied.    The patient tolerated the procedure without difficulty. No complications were encountered.    The patient was discharged home with the appropriate instructions.    Natalie Cao MD   2019  11:22 AM

## 2019-12-09 NOTE — PROGRESS NOTES
"Chief Complaint   Patient presents with   • Follow-up   • Shortness of Breath         Subjective   Denzel Harding is a 59 y.o. male.     History of Present Illness   Patient is here today for follow up of asthma and shortness of breath. Patient says that since the last office visit he has had no recent exacerbations. he denies any emergency room visits or hospitalizations, due to his asthma.     He reports having the \"stomach bug\" and didn't feel well in general during this time. He also reports having a \"cold\" and has had some increased cough with \"sticky mucus\". The cough has decreased some.     He has now had 4 Dupixent injections total. He feels overall his breathing has improved some.     The patient says that he is compliant with pulmonary medicines, as prescribed. He uses Symbicort and Asmanex twice a day. He uses Spiriva daily. He takes Accolate twice a day. He uses the rescue inhaler 2-3 times a week.     He uses nasalide daily.     He uses the nebulizer 4 times a day on a \"bad day\" and there are days he does not need it at all.     He uses BiPAP 13/5 nightly and sometimes during the day as well if he is having a bad day.    The following portions of the patient's history were reviewed and updated as appropriate: allergies, current medications, past family history, past medical history, past social history and past surgical history.    Review of Systems   Constitutional: Negative for chills and fever.   HENT: Negative for rhinorrhea, sinus pressure and sore throat.    Respiratory: Positive for cough and wheezing. Negative for chest tightness and shortness of breath.    Psychiatric/Behavioral: Positive for sleep disturbance.       Objective   Visit Vitals  /82   Pulse 92   Resp 18   Ht 177.8 cm (70\")   Wt (!) 143 kg (315 lb)   SpO2 95%   BMI 45.20 kg/m²     Physical Exam   Constitutional: He is oriented to person, place, and time.   HENT:   Head: Atraumatic.   Crowded oropharynx.    Eyes: EOM are " normal.   Neck: Neck supple.   Cardiovascular: Normal rate and regular rhythm.   Pulmonary/Chest: Effort normal. No respiratory distress.   Somewhat decreased A/E without wheezing noted.   Abdominal:   Obese.   Musculoskeletal: He exhibits no edema.   Gait with cane.   Neurological: He is alert and oriented to person, place, and time.   Psychiatric: He has a normal mood and affect.   Vitals reviewed.          Assessment/Plan   Denzel was seen today for follow-up and shortness of breath.    Diagnoses and all orders for this visit:    Severe persistent asthma without complication    Other allergic rhinitis    EDD (obstructive sleep apnea)    Morbid obesity, unspecified obesity type (CMS/[facility])           Return for keep appt with Doc in March.    DISCUSSION (if any):    His symptoms of asthma are stable control at this time. He should continue Symbicort, Spiriva, asmanex, accolate, and the rescue medication as needed.     Patient's medications for underlying asthma were reviewed with him in great detail.    Any needed adjustments to his pulmonary medications, either for clinical or insurance coverage reasons, have been made and are reflected in the orders.    Side effects of prescribed medications discussed with the patient.    Asthma action plan with discussed with him.    The patient was asked to call this office if the symptoms worsen.    He will need to continue Dupixent injections as ordered. He takes these injections at home. He has had no issue with obtaining the medication.    He should continue using BiPAP 13/5 with nasal pillows. He has no issues obtaining supplies.     Dictated utilizing Dragon dictation.    This document was electronically signed by ANTONIO Ramírez December 9, 2019  12:14 PM

## 2019-12-09 NOTE — DISCHARGE INSTRUCTIONS
No pushing, pulling, tugging,  heavy lifting, or strenuous activity.  No major decision making, driving, or drinking alcoholic beverages for 24 hours. ( due to the medications you have  received)  Always use good hand hygiene/washing techniques.  NO driving while taking pain medications.      To assist you in voiding:  Drink plenty of fluids  Listen to running water while attempting to void.    If you are unable to urinate and you have an uncomfortable urge to void or it has been   6 hours since you were discharged, return to the Emergency Room      Post Capsulotomy      POST YAG LASER CAPSULOTOMY      1. ACTIVITY: No physical restrictions.  2. DIET: No dietary restrictions.  3. MEDICATIONS:      A. Resume all the previous medications.      B. One drop of Pred Forte 1%  to operative eye 4 times           a day for 5 days. Then twice a day for  5 days then stop.          Start day of procedure as soon as you get home.  4. Special Instructions:     A. Call the doctor if there is any sudden change in vision. It is normal to see         new floaters or spot floating in your vision after the laser however, if there          is any sudden changes in vision, please call the office during normal work          day [phone]     B. If unable to reach the doctor, come to the Emergency Room at the [facility].    If you have any concerning problems, call your doctor or   the [facility] Emergency Dept   at [phone].

## 2019-12-11 RX ORDER — FLUNISOLIDE 0.25 MG/ML
SOLUTION NASAL
Qty: 50 ML | Refills: 0 | Status: SHIPPED | OUTPATIENT
Start: 2019-12-11 | End: 2019-12-29 | Stop reason: SDUPTHER

## 2019-12-16 ENCOUNTER — OFFICE VISIT (OUTPATIENT)
Dept: NEUROSURGERY | Facility: CLINIC | Age: 59
End: 2019-12-16

## 2019-12-16 ENCOUNTER — PREP FOR SURGERY (OUTPATIENT)
Dept: OTHER | Facility: HOSPITAL | Age: 59
End: 2019-12-16

## 2019-12-16 VITALS
SYSTOLIC BLOOD PRESSURE: 138 MMHG | BODY MASS INDEX: 43.38 KG/M2 | WEIGHT: 303 LBS | TEMPERATURE: 97.7 F | HEIGHT: 70 IN | DIASTOLIC BLOOD PRESSURE: 92 MMHG

## 2019-12-16 DIAGNOSIS — I65.09 BOW HUNTER'S STROKE: Primary | ICD-10-CM

## 2019-12-16 DIAGNOSIS — G45.0 VERTEBROBASILAR INSUFFICIENCY: ICD-10-CM

## 2019-12-16 DIAGNOSIS — G45.0 VERTEBROBASILAR ARTERY INSUFFICIENCY: ICD-10-CM

## 2019-12-16 PROCEDURE — 99204 OFFICE O/P NEW MOD 45 MIN: CPT | Performed by: RADIOLOGY

## 2019-12-16 RX ORDER — SODIUM CHLORIDE 0.9 % (FLUSH) 0.9 %
3 SYRINGE (ML) INJECTION EVERY 12 HOURS SCHEDULED
Status: CANCELLED | OUTPATIENT
Start: 2019-12-16

## 2019-12-16 RX ORDER — SODIUM CHLORIDE 0.9 % (FLUSH) 0.9 %
1-10 SYRINGE (ML) INJECTION AS NEEDED
Status: CANCELLED | OUTPATIENT
Start: 2019-12-16

## 2019-12-16 RX ORDER — SODIUM CHLORIDE 9 MG/ML
100 INJECTION, SOLUTION INTRAVENOUS CONTINUOUS
Status: CANCELLED | OUTPATIENT
Start: 2019-12-16

## 2019-12-16 NOTE — PROGRESS NOTES
"NAME: MIKY HASSAN   DOS: 2019  : 1960  PCP: Ottoniel Toledo MD    Chief Complaint:    Chief Complaint   Patient presents with   • Loss of Consciousness     Dizziness/near syncope, elicited with turning of head.  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 male who has dizziness and near syncope when turning his head either to the left or the right.  He has had noninvasive imaging studies which demonstrate mild atherosclerotic plaque formation, but without hemodynamically significant stenosis/disease.  He does have comorbidities of poorly controlled diabetes (hemoglobin A1c previously greater than 13), dyslipidemia, coronary artery disease (prior CABG/stent) and prior stroke.  He is a non-smoker.  He presents for evaluation of his dizziness/near syncope, for suspected \"Delphos Massimo's\" syndrome and/or vertebrobasilar insufficiency.    Past Medical History:  Past Medical History:   Diagnosis Date   • Anxiety    • Arthritis    • Asthma    • Brachial neuritis 2017   • Chest pain    • CHF (congestive heart failure) (CMS/HCC)    • COPD (chronic obstructive pulmonary disease) (CMS/HCC)    • Coronary artery disease    • Depression    • Diabetes mellitus (CMS/HCC)    • Dizziness    • Edema    • GERD (gastroesophageal reflux disease)    • H/O chest x-ray 2015    Mild Left base atelectasis   • H/O echocardiogram 2015    Normal LVSF. EF of 60-65%. Grade 1 diastolic dysfunction of the LV myocardium. No evidence of pericardial effusion   • H/O exercise stress test    • History of herniated intervertebral disc     History of left L5-S1 disc herniation   • Hypertension    • LOM (loss of memory) 2017   • Lower back pain     Right   • Measles    • Obstructive sleep apnea    • Palpitations    • Pericardial effusion    • Renal disorder    • Seizure disorder (CMS/HCC)    • Seizures (CMS/HCC)     FOCAL    • Shortness of breath 2017   • SOB " (shortness of breath)    • Stroke (CMS/HCC)    • Wears glasses        Past Surgical History:  Past Surgical History:   Procedure Laterality Date   • BACK SURGERY     • CARDIAC CATHETERIZATION     • CARDIAC CATHETERIZATION N/A 8/11/2017    Procedure: Coronary angiography;  Surgeon: Dallas Kim MD;  Location: [facility] CATH INVASIVE LOCATION;  Service:    • CARDIAC CATHETERIZATION N/A 8/11/2017    Procedure: Left Heart Cath;  Surgeon: Dallas Kim MD;  Location: [facility] CATH INVASIVE LOCATION;  Service:    • CARDIAC CATHETERIZATION N/A 8/11/2017    Procedure: Left ventriculography;  Surgeon: Dallas Kim MD;  Location: [facility] CATH INVASIVE LOCATION;  Service:    • CARDIOVASCULAR STRESS TEST  07/03/2017    WITH DR KIM AT [facility]   • CARPAL TUNNEL RELEASE Right    • CATARACT EXTRACTION W/ INTRAOCULAR LENS IMPLANT Right 6/12/2017    Procedure: CATARACT PHACO EXTRACTION WITH INTRAOCULAR LENS IMPLANT RIGHT ;  Surgeon: Natalie Cao MD;  Location: [facility];  Service:    • CATARACT EXTRACTION W/ INTRAOCULAR LENS IMPLANT Left 7/10/2017    Procedure: CATARACT PHACO EXTRACTION WITH INTRAOCULAR LENS IMPLANT LEFT;  Surgeon: Natalie Cao MD;  Location: [facility] OR;  Service:    • CHOLECYSTECTOMY     • CHOLECYSTECTOMY     • COLONOSCOPY     • CORONARY ANGIOPLASTY WITH STENT PLACEMENT     • CORONARY ANGIOPLASTY WITH STENT PLACEMENT      X1-LAD   • CORONARY ARTERY BYPASS GRAFT N/A 8/15/2017    Procedure: CORONARY ARTERY BYPASS GRAFTING x 3 UTILIZING THE LEFT INTERNAL MAMMARY ARTERY WITH ENDOSCOPIC VEIN HARVESTING OF THE RIGHT GREATER SAPHENOUS VEIN, HAMLET, LAD ENDARECTOMY;  Surgeon: London Damon MD;  Location: [facility];  Service:    • ENDOSCOPY     • EYE CAPSULOTOMY WITH LASER Right 11/25/2019    Procedure: EYE CAPSULOTOMY WITH LASER RIGHT;  Surgeon: Natalie Cao MD;  Location: [facility];  Service: Ophthalmology   • EYE CAPSULOTOMY WITH LASER Left 12/9/2019    Procedure: EYE CAPSULOTOMY WITH LASER LEFT;  Surgeon:  Natalie Cao MD;  Location: [facility] OR;  Service: Ophthalmology   • EYE SURGERY      cataract surgery both eyes   • KNEE ARTHROSCOPY Bilateral    • KNEE ARTHROSCOPY Right 2010    Dr Lewis   • KNEE ARTHROSCOPY Left 2008    Dr Jameson   • NEUROPLASTY / TRANSPOSITION ULNAR NERVE AT ELBOW     • OTHER SURGICAL HISTORY      Foraminotomy and discectomy   • PERICARDIAL WINDOW N/A 8/25/2017    Procedure: PERICARDIAL WINDOW;  Surgeon: Freeman Phillips MD;  Location: [facility] OR;  Service:        Review of Systems:        Review of Systems   Constitutional: Positive for activity change, appetite change, diaphoresis and unexpected weight change. Negative for chills and fever.   HENT: Positive for congestion, dental problem, ear pain, hearing loss, rhinorrhea, sinus pressure, sneezing, tinnitus, trouble swallowing and voice change. Negative for drooling, ear discharge, facial swelling, mouth sores, nosebleeds, postnasal drip and sore throat.    Eyes: Positive for photophobia and itching. Negative for pain, discharge, redness and visual disturbance.   Respiratory: Positive for apnea, cough, choking, chest tightness and shortness of breath. Negative for wheezing and stridor.    Cardiovascular: Positive for chest pain and leg swelling. Negative for palpitations.   Gastrointestinal: Positive for abdominal distention, abdominal pain, diarrhea and nausea. Negative for anal bleeding, blood in stool, constipation, rectal pain and vomiting.   Endocrine: Positive for cold intolerance, heat intolerance, polydipsia and polyuria. Negative for polyphagia.   Genitourinary: Positive for frequency and urgency. Negative for decreased urine volume, difficulty urinating, dysuria, enuresis, flank pain, genital sores and hematuria.   Musculoskeletal: Positive for arthralgias, back pain, gait problem, joint swelling, myalgias, neck pain and neck stiffness.   Skin: Negative for color change, pallor, rash and wound.   Allergic/Immunologic: Negative for  environmental allergies, food allergies and immunocompromised state.   Neurological: Negative for dizziness, tremors, seizures, syncope, facial asymmetry, speech difficulty, weakness, light-headedness, numbness and headaches.   Hematological: Negative for adenopathy. Does not bruise/bleed easily.   Psychiatric/Behavioral: Negative for agitation, behavioral problems, confusion, decreased concentration, dysphoric mood, hallucinations, self-injury, sleep disturbance and suicidal ideas. The patient is not nervous/anxious and is not hyperactive.         Medications    Current Outpatient Medications:   •  acetaminophen (TYLENOL) 325 MG tablet, Take 2 tablets by mouth Every 4 (Four) Hours As Needed for Mild Pain ., Disp: , Rfl:   •  albuterol sulfate  (90 Base) MCG/ACT inhaler, Inhale 2 puffs Every 6 (Six) Hours As Needed for Wheezing or Shortness of Air., Disp: 1 inhaler, Rfl: 4  •  amitriptyline (ELAVIL) 25 MG tablet, TAKE 3 TABLETS BY MOUTH EVERY NIGHT, Disp: 90 tablet, Rfl: 11  •  amLODIPine (NORVASC) 5 MG tablet, TAKE 1/2 TABLET BY MOUTH DAILY, Disp: 45 tablet, Rfl: 3  •  ASMANEX  MCG/ACT aerosol, Inhale 1 puff 2 (Two) Times a Day. (Patient taking differently: Inhale 2 puffs 2 (Two) Times a Day.), Disp: 1 inhaler, Rfl: 5  •  aspirin 325 MG tablet, Take 1 tablet by mouth Daily., Disp: , Rfl:   •  atorvastatin (LIPITOR) 80 MG tablet, Take 1 tablet by mouth Every Night., Disp: 30 tablet, Rfl: 0  •  azelastine (ASTELIN) 0.1 % nasal spray, 1 spray into the nostril(s) as directed by provider 2 (Two) Times a Day As Needed for Rhinitis or Allergies. Use in each nostril as directed, Disp: 1 each, Rfl: 5  •  budesonide-formoterol (SYMBICORT) 80-4.5 MCG/ACT inhaler, Inhale 2 puffs 2 (Two) Times a Day. Rinse mouth with water after use., Disp: 1 inhaler, Rfl: 5  •  bumetanide (BUMEX) 2 MG tablet, Take 1 tablet by mouth Daily. Take extra 2mg as needed for 3lb wt gain in 24hrs, increased shortness of breath, Disp: 60  tablet, Rfl: 5  •  coenzyme Q10 100 MG capsule, Take 100 mg by mouth Daily., Disp: , Rfl:   •  cyclobenzaprine (FLEXERIL) 10 MG tablet, Take 1 tablet by mouth 3 (Three) Times a Day As Needed for Muscle Spasms., Disp: 15 tablet, Rfl: 0  •  CYCLOSET 0.8 MG tablet, Take 3.2 mg by mouth Every Morning., Disp: , Rfl: 0  •  flunisolide (NASALIDE) 25 MCG/ACT (0.025%) solution nasal spray, 2 sprays 2 (Two) Times a Day., Disp: , Rfl: 0  •  flunisolide (NASALIDE) 25 MCG/ACT (0.025%) solution nasal spray, instill 2 sprays into each nostril twice a day, Disp: 50 mL, Rfl: 0  •  gabapentin (NEURONTIN) 600 MG tablet, Take 600 mg by mouth 2 (Two) Times a Day., Disp: , Rfl: 0  •  glucose blood test strip, Use as instructed, Disp: 100 each, Rfl: 12  •  HUMALOG KWIKPEN 100 UNIT/ML solution pen-injector, Inject 10 Units under the skin 3 (Three) Times a Day With Meals. Follows SSI From PCP (Patient taking differently: Inject 50 Units under the skin into the appropriate area as directed 3 (Three) Times a Day With Meals. Follows SSI From PCP), Disp: , Rfl: 0  •  HYDROcodone-acetaminophen (NORCO) 5-325 MG per tablet, Take 1 tablet by mouth Every 6 (Six) Hours As Needed for Severe Pain ., Disp: 12 tablet, Rfl: 0  •  ipratropium-albuterol (DUO-NEB) 0.5-2.5 mg/3 ml nebulizer, Take 3 mL by nebulization 4 (Four) Times a Day As Needed for Wheezing or Shortness of Air., Disp: 360 mL, Rfl: 5  •  levocetirizine (XYZAL) 5 MG tablet, Take 5 mg by mouth Every Evening., Disp: , Rfl: 0  •  lisinopril (PRINIVIL,ZESTRIL) 20 MG tablet, Take 20 mg by mouth Daily., Disp: , Rfl:   •  meclizine (ANTIVERT) 25 MG tablet, Take 1 tablet by mouth 3 (Three) Times a Day As Needed for dizziness., Disp: 10 tablet, Rfl: 0  •  metoprolol tartrate (LOPRESSOR) 50 MG tablet, take 1 tablet by mouth twice a day, Disp: 180 tablet, Rfl: 3  •  omeprazole (priLOSEC) 20 MG capsule, Take 20 mg by mouth Daily., Disp: , Rfl: 0  •  ondansetron ODT (ZOFRAN-ODT) 4 MG disintegrating  tablet, Take 1 tablet by mouth Every 6 (Six) Hours As Needed for Nausea., Disp: 20 tablet, Rfl: 0  •  potassium chloride (K-DUR,KLOR-CON) 20 MEQ CR tablet, Take 1 tablet by mouth Daily., Disp: 8 tablet, Rfl: 0  •  promethazine (PHENERGAN) 25 MG tablet, Take 1 tablet by mouth Every 6 (Six) Hours As Needed for Nausea or Vomiting., Disp: 20 tablet, Rfl: 0  •  ranolazine (RANEXA) 500 MG 12 hr tablet, take 1 tablet by mouth twice a day, Disp: 180 tablet, Rfl: 3  •  spironolactone (ALDACTONE) 25 MG tablet, Take 1 tablet by mouth Daily., Disp: 30 tablet, Rfl: 2  •  Tiotropium Bromide Monohydrate (SPIRIVA RESPIMAT) 1.25 MCG/ACT aerosol solution inhaler, Inhale 2 puffs Daily., Disp: 1 inhaler, Rfl: 5  •  TOUJEO SOLOSTAR 300 UNIT/ML solution pen-injector, Inject 60 Units as directed Daily. (Patient taking differently: Inject 160 Units as directed Every Night.), Disp: , Rfl: 0  •  TURMERIC PO, Take 500 mg by mouth 2 (Two) Times a Day., Disp: , Rfl:   •  VICTOZA 18 MG/3ML solution pen-injector injection, Inject 0.6 mg under the skin into the appropriate area as directed Daily., Disp: , Rfl: 0  •  vitamin C (ASCORBIC ACID) 500 MG tablet, Take 500 mg by mouth Daily., Disp: , Rfl:   •  Vortioxetine HBr (TRINTELLIX) 20 MG tablet, Take 20 mg by mouth Daily., Disp: , Rfl:   •  zafirlukast (ACCOLATE) 20 MG tablet, Take 1 tablet by mouth 2 (Two) Times a Day., Disp: 60 tablet, Rfl: 5    Current Facility-Administered Medications:   •  Dupilumab solution prefilled syringe 300 mg, 300 mg, Subcutaneous, Q14 Days, Matilde Bains APRN    Allergies:  Allergies   Allergen Reactions   • Sulfa Antibiotics Anaphylaxis, Nausea And Vomiting and Delirium   • Codeine Nausea And Vomiting     Can only take with Phenergan   • Invokana [Canagliflozin]      DKA   • Morphine      Does not work. It causes pain       Social Hx:  Social History     Tobacco Use   • Smoking status: Former Smoker     Packs/day: 1.00     Years: 10.00     Pack years:  "10.     Types: Cigarettes     Last attempt to quit: 1998     Years since quittin.9   • Smokeless tobacco: Former User     Types: Snuff     Quit date:    Substance Use Topics   • Alcohol use: Yes     Frequency: Monthly or less     Drinks per session: 3 or 4     Comment: 3 PER YEAR   • Drug use: No       Family Hx:  Family History   Problem Relation Age of Onset   • Hypertension Mother    • Arthritis Mother    • Alcohol abuse Father    • Heart disease Other    • Stroke Other    • Hypertension Other    • Other Other         Neurologic disorder   • Parkinsonism Other    • Stroke Other    • Heart disease Other    • Hypertension Other    • Heart disease Other    • Stroke Other    • Hypertension Other        Review of Imaging:  CT angiogram of the head neck dated 10/15/2019 was reviewed along with its corresponding radiologic report.  This demonstrates mild calcific plaque at the carotid bifurcations in the left vertebral artery origin, but without evidence of hemodynamically significant disease.    Physical Examination:  Vitals:    19 1520   BP: 138/92   Temp: 97.7 °F (36.5 °C)        General Appearance:   Well developed, well nourished, well groomed, alert, and cooperative.  Cardiovascular: Regular rate and rhythm. No carotid bruits      Neurological examination:  Neurologic Exam     Mental Status   Oriented to person, place, and time.   Speech: speech is normal   Level of consciousness: alert    Cranial Nerves   Cranial nerves II through XII intact.     Motor Exam     Strength   Strength 5/5 throughout.     Sensory Exam   Light touch normal.     Gait, Coordination, and Reflexes     Gait  Gait: normalUses a cane for \"stability\".       Diagnoses/Plan:    Mr. Harding is a 59 y.o. male with reproducible episodes of dizziness and near syncope when turning his head either to the left or to the right.  He has had noninvasive imaging studies which failed to demonstrate any hemodynamically significant " "stenosis involving his cervical carotid or vertebral vasculature; however, these are performed in the \"neutral\" position.  His symptoms are fairly nonspecific, but certainly could be attributable to some sort of impingement of his vertebrobasilar system and \"[place] Massimo's\" syndrome/vertebrobasilar insufficiency.  I discussed this at length with Mr. Harding, informed him that the definitive way to diagnosis would be with a \"dynamic\" catheter angiogram, with detailed evaluation of his vertebral vasculature in both the neutral and head turned positions.  He is agreeable to this, and we will plan on performing this in the next couple of weeks or so (likely deferring until after the holidays), deferring any definitive intervention pending results of the diagnostic angiogram.                  "

## 2019-12-17 RX ORDER — AMITRIPTYLINE HYDROCHLORIDE 25 MG/1
TABLET, FILM COATED ORAL
Qty: 270 TABLET | Refills: 0 | Status: SHIPPED | OUTPATIENT
Start: 2019-12-17 | End: 2020-03-10

## 2019-12-18 RX ORDER — ALBUTEROL SULFATE 90 UG/1
AEROSOL, METERED RESPIRATORY (INHALATION)
Qty: 54 G | Refills: 0 | Status: SHIPPED | OUTPATIENT
Start: 2019-12-18 | End: 2020-03-02 | Stop reason: SDUPTHER

## 2019-12-29 ENCOUNTER — APPOINTMENT (OUTPATIENT)
Dept: PREADMISSION TESTING | Facility: HOSPITAL | Age: 59
End: 2019-12-29

## 2019-12-29 DIAGNOSIS — G45.0 VERTEBROBASILAR INSUFFICIENCY: ICD-10-CM

## 2019-12-29 DIAGNOSIS — I65.09 BOW HUNTER'S STROKE: ICD-10-CM

## 2019-12-29 LAB
ANION GAP SERPL CALCULATED.3IONS-SCNC: 12 MMOL/L (ref 5–15)
BUN BLD-MCNC: 10 MG/DL (ref 6–20)
BUN/CREAT SERPL: 12.5 (ref 7–25)
CALCIUM SPEC-SCNC: 9.6 MG/DL (ref 8.6–10.5)
CHLORIDE SERPL-SCNC: 97 MMOL/L (ref 98–107)
CO2 SERPL-SCNC: 28 MMOL/L (ref 22–29)
CREAT BLD-MCNC: 0.8 MG/DL (ref 0.76–1.27)
DEPRECATED RDW RBC AUTO: 43.2 FL (ref 37–54)
ERYTHROCYTE [DISTWIDTH] IN BLOOD BY AUTOMATED COUNT: 14.7 % (ref 12.3–15.4)
GFR SERPL CREATININE-BSD FRML MDRD: 99 ML/MIN/1.73
GLUCOSE BLD-MCNC: 351 MG/DL (ref 65–99)
HCT VFR BLD AUTO: 43.9 % (ref 37.5–51)
HGB BLD-MCNC: 14.1 G/DL (ref 13–17.7)
MCH RBC QN AUTO: 26.3 PG (ref 26.6–33)
MCHC RBC AUTO-ENTMCNC: 32.1 G/DL (ref 31.5–35.7)
MCV RBC AUTO: 81.8 FL (ref 79–97)
PLATELET # BLD AUTO: 220 10*3/MM3 (ref 140–450)
PMV BLD AUTO: 10.3 FL (ref 6–12)
POTASSIUM BLD-SCNC: 4.5 MMOL/L (ref 3.5–5.2)
RBC # BLD AUTO: 5.37 10*6/MM3 (ref 4.14–5.8)
SODIUM BLD-SCNC: 137 MMOL/L (ref 136–145)
WBC NRBC COR # BLD: 6.7 10*3/MM3 (ref 3.4–10.8)

## 2019-12-29 PROCEDURE — 36415 COLL VENOUS BLD VENIPUNCTURE: CPT

## 2019-12-29 PROCEDURE — 85027 COMPLETE CBC AUTOMATED: CPT | Performed by: RADIOLOGY

## 2019-12-29 PROCEDURE — 80048 BASIC METABOLIC PNL TOTAL CA: CPT | Performed by: RADIOLOGY

## 2019-12-29 RX ORDER — BUMETANIDE 2 MG/1
2 TABLET ORAL DAILY PRN
Status: ON HOLD | COMMUNITY
End: 2019-12-31

## 2019-12-29 RX ORDER — ACETAMINOPHEN 500 MG
1000 TABLET ORAL EVERY 6 HOURS PRN
COMMUNITY
End: 2020-05-14

## 2019-12-29 NOTE — DISCHARGE INSTRUCTIONS
"Dear Patient,    Do NOT eat, drink, or smoke after midnight the night before your procedure.   Take your medications as instructed by your doctor.    Glasses and jewelry may be worn, but dentures must be removed prior to your procedure.    Leave any items you consider valuable at home.      MORNING of your Procedure, please bring the following:     -Photo ID and insurance card(s)    -ALL medications in their ORIGINAL CONTAINERS    -Co-pay and/or deductible required by your insurance   -Copy of living will or power of  document (if not brought to    Pre-Admission Testing department)   -CPAP mask and tubing, not your machine (if applicable)    -Relaxation aids (music, books, magazines)   -Skin Prep Instruction Sheet (if applicable)   -Relaxation Aids    Check in on the 2nd floor in the [facility].  Your procedure will be performed in the cath lab or EP lab.  During your procedure, your family will wait in the cath lab waiting area where you checked in.      Need to make arrangements for transportation prior to discharge.    A handout regarding \"Heart Healthy Eating\" was provided today to encourage healthy eating habits.    Booklet published by [employer] was given in Pre-Admission testing.  This booklet is for informational purposes only.  If you have any questions about your procedure, please speak with your physician.      Please note:  If you are scheduled to have one of the following procedures: Pulmonary Vein Ablation, Lead Extraction, MitraClip, Cerebral Coilings or Embolization, please let your family know that after your procedure you will be going to recovery unit on the 2nd floor of the [facility].  When the physician is finished speaking with your family after your procedure is completed, your family will be directed or escorted to the surgery waiting area in the [facility].  This is where your family will wait until you are given a room assignment and then your family will be directed to the " appropriate unit.

## 2019-12-31 ENCOUNTER — HOSPITAL ENCOUNTER (OUTPATIENT)
Facility: HOSPITAL | Age: 59
Setting detail: HOSPITAL OUTPATIENT SURGERY
Discharge: HOME | End: 2019-12-31
Attending: RADIOLOGY | Admitting: RADIOLOGY

## 2019-12-31 VITALS
HEART RATE: 94 BPM | DIASTOLIC BLOOD PRESSURE: 81 MMHG | RESPIRATION RATE: 20 BRPM | TEMPERATURE: 99.1 F | BODY MASS INDEX: 44.67 KG/M2 | OXYGEN SATURATION: 93 % | WEIGHT: 312 LBS | HEIGHT: 70 IN | SYSTOLIC BLOOD PRESSURE: 138 MMHG

## 2019-12-31 DIAGNOSIS — I65.09 BOW HUNTER'S STROKE: ICD-10-CM

## 2019-12-31 DIAGNOSIS — G45.0 VERTEBROBASILAR INSUFFICIENCY: ICD-10-CM

## 2019-12-31 LAB
GLUCOSE BLDC GLUCOMTR-MCNC: 281 MG/DL (ref 70–130)
GLUCOSE BLDC GLUCOMTR-MCNC: 318 MG/DL (ref 70–130)

## 2019-12-31 PROCEDURE — 82962 GLUCOSE BLOOD TEST: CPT

## 2019-12-31 PROCEDURE — 25010000002 HEPARIN (PORCINE) PER 1000 UNITS: Performed by: RADIOLOGY

## 2019-12-31 PROCEDURE — C1769 GUIDE WIRE: HCPCS | Performed by: RADIOLOGY

## 2019-12-31 PROCEDURE — 36225 PLACE CATH SUBCLAVIAN ART: CPT | Performed by: RADIOLOGY

## 2019-12-31 PROCEDURE — 0 IODIXANOL PER 1 ML: Performed by: RADIOLOGY

## 2019-12-31 PROCEDURE — 36226 PLACE CATH VERTEBRAL ART: CPT | Performed by: RADIOLOGY

## 2019-12-31 PROCEDURE — 63710000001 INSULIN LISPRO (HUMAN) PER 5 UNITS: Performed by: RADIOLOGY

## 2019-12-31 PROCEDURE — C1894 INTRO/SHEATH, NON-LASER: HCPCS | Performed by: RADIOLOGY

## 2019-12-31 PROCEDURE — 25010000002 FENTANYL CITRATE (PF) 100 MCG/2ML SOLUTION: Performed by: RADIOLOGY

## 2019-12-31 PROCEDURE — 25010000002 MIDAZOLAM PER 1 MG: Performed by: RADIOLOGY

## 2019-12-31 PROCEDURE — 36223 PLACE CATH CAROTID/INOM ART: CPT | Performed by: RADIOLOGY

## 2019-12-31 PROCEDURE — 25010000002 PROMETHAZINE PER 50 MG: Performed by: RADIOLOGY

## 2019-12-31 RX ORDER — ACETAMINOPHEN 325 MG/1
650 TABLET ORAL EVERY 4 HOURS PRN
Status: DISCONTINUED | OUTPATIENT
Start: 2019-12-31 | End: 2019-12-31 | Stop reason: HOSPADM

## 2019-12-31 RX ORDER — IODIXANOL 320 MG/ML
INJECTION, SOLUTION INTRAVASCULAR AS NEEDED
Status: DISCONTINUED | OUTPATIENT
Start: 2019-12-31 | End: 2019-12-31 | Stop reason: HOSPADM

## 2019-12-31 RX ORDER — SODIUM CHLORIDE 9 MG/ML
100 INJECTION, SOLUTION INTRAVENOUS CONTINUOUS
Status: DISCONTINUED | OUTPATIENT
Start: 2019-12-31 | End: 2019-12-31 | Stop reason: HOSPADM

## 2019-12-31 RX ORDER — SODIUM CHLORIDE 0.9 % (FLUSH) 0.9 %
10 SYRINGE (ML) INJECTION AS NEEDED
Status: DISCONTINUED | OUTPATIENT
Start: 2019-12-31 | End: 2019-12-31 | Stop reason: HOSPADM

## 2019-12-31 RX ORDER — FENTANYL CITRATE 50 UG/ML
INJECTION, SOLUTION INTRAMUSCULAR; INTRAVENOUS AS NEEDED
Status: DISCONTINUED | OUTPATIENT
Start: 2019-12-31 | End: 2019-12-31 | Stop reason: HOSPADM

## 2019-12-31 RX ORDER — SODIUM CHLORIDE 0.9 % (FLUSH) 0.9 %
1-10 SYRINGE (ML) INJECTION AS NEEDED
Status: DISCONTINUED | OUTPATIENT
Start: 2019-12-31 | End: 2019-12-31 | Stop reason: HOSPADM

## 2019-12-31 RX ORDER — SODIUM CHLORIDE 0.9 % (FLUSH) 0.9 %
3 SYRINGE (ML) INJECTION EVERY 12 HOURS SCHEDULED
Status: DISCONTINUED | OUTPATIENT
Start: 2019-12-31 | End: 2019-12-31 | Stop reason: HOSPADM

## 2019-12-31 RX ORDER — SODIUM CHLORIDE 9 MG/ML
75 INJECTION, SOLUTION INTRAVENOUS CONTINUOUS
Status: ACTIVE | OUTPATIENT
Start: 2019-12-31 | End: 2019-12-31

## 2019-12-31 RX ORDER — MIDAZOLAM HYDROCHLORIDE 1 MG/ML
INJECTION INTRAMUSCULAR; INTRAVENOUS AS NEEDED
Status: DISCONTINUED | OUTPATIENT
Start: 2019-12-31 | End: 2019-12-31 | Stop reason: HOSPADM

## 2019-12-31 RX ORDER — PROMETHAZINE HYDROCHLORIDE 25 MG/ML
12.5 INJECTION, SOLUTION INTRAMUSCULAR; INTRAVENOUS EVERY 6 HOURS PRN
Status: DISCONTINUED | OUTPATIENT
Start: 2019-12-31 | End: 2019-12-31 | Stop reason: HOSPADM

## 2019-12-31 RX ORDER — LIDOCAINE HYDROCHLORIDE 10 MG/ML
INJECTION, SOLUTION EPIDURAL; INFILTRATION; INTRACAUDAL; PERINEURAL AS NEEDED
Status: DISCONTINUED | OUTPATIENT
Start: 2019-12-31 | End: 2019-12-31 | Stop reason: HOSPADM

## 2019-12-31 RX ADMIN — PROMETHAZINE HYDROCHLORIDE 12.5 MG: 25 INJECTION INTRAMUSCULAR; INTRAVENOUS at 08:19

## 2019-12-31 RX ADMIN — SODIUM CHLORIDE 100 ML/HR: 9 INJECTION, SOLUTION INTRAVENOUS at 07:44

## 2020-01-09 ENCOUNTER — OFFICE VISIT (OUTPATIENT)
Dept: ORTHOPEDIC SURGERY | Facility: CLINIC | Age: 60
End: 2020-01-09

## 2020-01-09 VITALS — WEIGHT: 314 LBS | BODY MASS INDEX: 44.95 KG/M2 | HEIGHT: 70 IN | RESPIRATION RATE: 18 BRPM

## 2020-01-09 DIAGNOSIS — M17.11 PRIMARY LOCALIZED OSTEOARTHRITIS OF RIGHT KNEE: Primary | ICD-10-CM

## 2020-01-09 DIAGNOSIS — M17.12 PRIMARY LOCALIZED OSTEOARTHRITIS OF LEFT KNEE: ICD-10-CM

## 2020-01-09 DIAGNOSIS — S86.911A KNEE STRAIN, RIGHT, INITIAL ENCOUNTER: ICD-10-CM

## 2020-01-09 PROCEDURE — 99212 OFFICE O/P EST SF 10 MIN: CPT | Performed by: PHYSICIAN ASSISTANT

## 2020-01-09 RX ORDER — METHYLPREDNISOLONE ACETATE 40 MG/ML
40 INJECTION, SUSPENSION INTRA-ARTICULAR; INTRALESIONAL; INTRAMUSCULAR; SOFT TISSUE
Status: DISCONTINUED | OUTPATIENT
Start: 2020-01-09 | End: 2020-01-09

## 2020-01-09 RX ORDER — DUPILUMAB 300 MG/2ML
INJECTION, SOLUTION SUBCUTANEOUS
COMMUNITY
Start: 2019-12-16 | End: 2020-05-18 | Stop reason: SDUPTHER

## 2020-01-09 RX ORDER — LIDOCAINE HYDROCHLORIDE 10 MG/ML
2 INJECTION, SOLUTION INFILTRATION; PERINEURAL
Status: DISCONTINUED | OUTPATIENT
Start: 2020-01-09 | End: 2020-01-09

## 2020-01-09 NOTE — PROGRESS NOTES
"Subjective   Denzel Harding is a 59 y.o. male here today re-evaluation    Chief Complaint   Patient presents with   • Left Knee - Follow-up   • Right Knee - Pain     Patient is following up in regards to bilateral knee pain.  He states the left knee is doing great after the cortisone injection.  However, he is still experiencing right posterior lateral knee pain the cortisone shot helped for approximately 1 week but then his pain returned.  He has had a knee arthroscopy to the right knee in the past and per the patient was told by the orthopedic surgeon \" the right knee would need to be replaced in the near future\"  He denies knee instability or locking sensation of the right knee.  He states when he is driving keeping his knee slightly bent he notices his pain.      Past Medical History:   Diagnosis Date   • Anxiety    • Arthritis    • Asthma    • Brachial neuritis 1/19/2017   • Cellulitis     left arm and right leg    • Chest pain    • CHF (congestive heart failure) (CMS/[facility])    • COPD (chronic obstructive pulmonary disease) (CMS/[facility])    • Coronary artery disease    • Depression    • Diabetes mellitus (CMS/[facility])     diagnosed in 1998, checks fsbg 3-4x/day   • Dizziness    • Edema    • GERD (gastroesophageal reflux disease)    • H/O chest x-ray 07/04/2015    Mild Left base atelectasis   • H/O echocardiogram 07/05/2015    Normal LVSF. EF of 60-65%. Grade 1 diastolic dysfunction of the LV myocardium. No evidence of pericardial effusion   • H/O exercise stress test    • History of herniated intervertebral disc     History of left L5-S1 disc herniation   • Hypertension    • LOM (loss of memory) 1/19/2017   • Lower back pain     Right   • Measles    • Neuropathy     feet    • EDD treated with BiPAP     setting 13 and 5   • Palpitations    • Pericardial effusion    • Renal disorder    • Seizure disorder (CMS/[facility])    • Seizures (CMS/[facility])     FOCAL last 2009   • Shortness of breath 1/19/2017   • SOB (shortness of breath)  "   • Staph infection     back after sugery at the incision site    • Stroke (CMS/HCC)     x2 most recent , slight weakness in hands occasionaly    • Wears glasses          Past Surgical History:   Procedure Laterality Date   • BACK SURGERY     • CARDIAC CATHETERIZATION     • CARDIAC CATHETERIZATION N/A 8/11/2017    Procedure: Coronary angiography;  Surgeon: Dallas Kim MD;  Location: [facility] CATH INVASIVE LOCATION;  Service:    • CARDIAC CATHETERIZATION N/A 8/11/2017    Procedure: Left Heart Cath;  Surgeon: Dallas Kim MD;  Location: [facility] CATH INVASIVE LOCATION;  Service:    • CARDIAC CATHETERIZATION N/A 8/11/2017    Procedure: Left ventriculography;  Surgeon: Dallas Kim MD;  Location: [facility] CATH INVASIVE LOCATION;  Service:    • CARDIOVASCULAR STRESS TEST  07/03/2017    WITH DR KIM AT [facility]   • CARPAL TUNNEL RELEASE Right    • CATARACT EXTRACTION W/ INTRAOCULAR LENS IMPLANT Right 6/12/2017    Procedure: CATARACT PHACO EXTRACTION WITH INTRAOCULAR LENS IMPLANT RIGHT ;  Surgeon: Natalie Cao MD;  Location: [facility] OR;  Service:    • CATARACT EXTRACTION W/ INTRAOCULAR LENS IMPLANT Left 7/10/2017    Procedure: CATARACT PHACO EXTRACTION WITH INTRAOCULAR LENS IMPLANT LEFT;  Surgeon: Natalie Cao MD;  Location: [facility] OR;  Service:    • CHOLECYSTECTOMY     • CHOLECYSTECTOMY     • COLONOSCOPY     • CORONARY ANGIOPLASTY WITH STENT PLACEMENT      X1-LAD   • CORONARY ARTERY BYPASS GRAFT N/A 8/15/2017    Procedure: CORONARY ARTERY BYPASS GRAFTING x 3 UTILIZING THE LEFT INTERNAL MAMMARY ARTERY WITH ENDOSCOPIC VEIN HARVESTING OF THE RIGHT GREATER SAPHENOUS VEIN, HAMLET, LAD ENDARECTOMY;  Surgeon: London Damon MD;  Location: [facility] OR;  Service:    • ENDOSCOPY     • EYE CAPSULOTOMY WITH LASER Right 11/25/2019    Procedure: EYE CAPSULOTOMY WITH LASER RIGHT;  Surgeon: Natalie Cao MD;  Location: [facility] OR;  Service: Ophthalmology   • EYE CAPSULOTOMY WITH LASER Left 12/9/2019    Procedure: EYE  "CAPSULOTOMY WITH LASER LEFT;  Surgeon: Natalie Cao MD;  Location:  [facility] OR;  Service: Ophthalmology   • EYE SURGERY      cataract surgery both eyes   • INTERVENTIONAL RADIOLOGY PROCEDURE Bilateral 12/31/2019    Procedure: Carotid Cerebral Angiogram;  Surgeon: Damian Dubois MD;  Location:  [facility] CATH INVASIVE LOCATION;  Service: Interventional Radiology   • KNEE ARTHROSCOPY Bilateral    • KNEE ARTHROSCOPY Right 2010    Dr Lewis   • KNEE ARTHROSCOPY Left 2008    Dr Jameson   • NEUROPLASTY / TRANSPOSITION ULNAR NERVE AT ELBOW     • OTHER SURGICAL HISTORY      Foraminotomy and discectomy   • PERICARDIAL WINDOW N/A 8/25/2017    Procedure: PERICARDIAL WINDOW;  Surgeon: Freeman Phillips MD;  Location:  [facility] OR;  Service:        Allergies   Allergen Reactions   • Sulfa Antibiotics Anaphylaxis, Nausea And Vomiting and Delirium   • Invokana [Canagliflozin] Other (See Comments)     DKA   • Codeine Nausea And Vomiting     Can only take with Phenergan   • Morphine Other (See Comments)     Does not work. It causes pain       Objective   Resp 18   Ht 177.8 cm (70\")   Wt (!) 142 kg (314 lb)   BMI 45.05 kg/m²    Physical Exam   Constitutional: He is oriented to person, place, and time. He appears well-developed.   Eyes: Conjunctivae are normal.   Pulmonary/Chest: Effort normal.   Musculoskeletal:        Right knee: He exhibits no swelling, no effusion and no ecchymosis. Tenderness found. Lateral joint line tenderness noted.        Legs:  Neurological: He is alert and oriented to person, place, and time.   Psychiatric: He has a normal mood and affect.   Nursing note and vitals reviewed.      Skin exam stable with no erythema, ecchymosis or rash.  No new swelling.  No motor or sensory changes.  Distal pulse intact.    Date/Time: 1/9/2020 1:40 PM    Date/Time: 1/9/2020 1:41 PM          Neg. Don's sign to BLE      Assessment/Plan      Diagnosis Plan   1. Primary localized osteoarthritis of right knee  Large Joint " Arthrocentesis    Ambulatory Referral to Physical Therapy Evaluate and treat, Ortho; Strengthening, ROM, Stretching   2. Primary localized osteoarthritis of left knee  Large Joint Arthrocentesis   3. Knee strain, right, initial encounter  Ambulatory Referral to Physical Therapy Evaluate and treat, Ortho; Strengthening, ROM, Stretching       Call or notify for any adverse effect from injection therapy  Ice, heat, and/or modalities as beneficial  Watch for signs and symptoms of infection  Guided on proper techniques for mobility, strength, agility and/or conditioning exercises    Recommendations:   May return to routine exercise and physical work as tolerated. and No strenuous activity.  Use warm heat to the posterior right knee.  Enroll in physical therapy.  I would like to order Visco supplementation injection for the right knee considering he failed cortisone therapy.  We will refrain from repeat intra-articular cortisone injection as he is having no left knee pain in the right knee did not respond to cortisone  Patient agreeable to call or return sooner for any concerns.

## 2020-01-28 ENCOUNTER — HOSPITAL ENCOUNTER (EMERGENCY)
Facility: HOSPITAL | Age: 60
Discharge: HOME | End: 2020-01-28
Attending: EMERGENCY MEDICINE | Admitting: EMERGENCY MEDICINE

## 2020-01-28 ENCOUNTER — APPOINTMENT (OUTPATIENT)
Dept: CT IMAGING | Facility: HOSPITAL | Age: 60
End: 2020-01-28

## 2020-01-28 VITALS
WEIGHT: 310 LBS | SYSTOLIC BLOOD PRESSURE: 144 MMHG | DIASTOLIC BLOOD PRESSURE: 80 MMHG | HEART RATE: 79 BPM | TEMPERATURE: 97.7 F | RESPIRATION RATE: 16 BRPM | HEIGHT: 70 IN | OXYGEN SATURATION: 95 % | BODY MASS INDEX: 44.38 KG/M2

## 2020-01-28 DIAGNOSIS — G89.29 CHRONIC BILATERAL LOW BACK PAIN, UNSPECIFIED WHETHER SCIATICA PRESENT: Primary | ICD-10-CM

## 2020-01-28 DIAGNOSIS — M54.50 CHRONIC BILATERAL LOW BACK PAIN, UNSPECIFIED WHETHER SCIATICA PRESENT: Primary | ICD-10-CM

## 2020-01-28 DIAGNOSIS — R51.9 NONINTRACTABLE HEADACHE, UNSPECIFIED CHRONICITY PATTERN, UNSPECIFIED HEADACHE TYPE: ICD-10-CM

## 2020-01-28 PROCEDURE — 70450 CT HEAD/BRAIN W/O DYE: CPT

## 2020-01-28 PROCEDURE — 99283 EMERGENCY DEPT VISIT LOW MDM: CPT

## 2020-01-28 RX ORDER — TIZANIDINE HYDROCHLORIDE 4 MG/1
4 CAPSULE, GELATIN COATED ORAL 3 TIMES DAILY
Qty: 20 CAPSULE | Refills: 0 | Status: SHIPPED | OUTPATIENT
Start: 2020-01-28 | End: 2020-01-28 | Stop reason: SDUPTHER

## 2020-01-28 RX ORDER — TIZANIDINE HYDROCHLORIDE 4 MG/1
4 CAPSULE, GELATIN COATED ORAL 3 TIMES DAILY
Qty: 20 CAPSULE | Refills: 0 | Status: SHIPPED | OUTPATIENT
Start: 2020-01-28 | End: 2020-06-08 | Stop reason: SDUPTHER

## 2020-01-28 RX ORDER — IBUPROFEN 600 MG/1
600 TABLET ORAL EVERY 6 HOURS PRN
Qty: 30 TABLET | Refills: 0 | Status: SHIPPED | OUTPATIENT
Start: 2020-01-28 | End: 2020-01-28 | Stop reason: SDUPTHER

## 2020-01-28 RX ORDER — CYCLOBENZAPRINE HCL 10 MG
10 TABLET ORAL ONCE
Status: COMPLETED | OUTPATIENT
Start: 2020-01-28 | End: 2020-01-28

## 2020-01-28 RX ORDER — KETOROLAC TROMETHAMINE 10 MG/1
10 TABLET, FILM COATED ORAL EVERY 6 HOURS PRN
Status: DISCONTINUED | OUTPATIENT
Start: 2020-01-28 | End: 2020-01-28 | Stop reason: HOSPADM

## 2020-01-28 RX ORDER — IBUPROFEN 600 MG/1
600 TABLET ORAL EVERY 6 HOURS PRN
Qty: 30 TABLET | Refills: 0 | Status: SHIPPED | OUTPATIENT
Start: 2020-01-28 | End: 2020-02-05

## 2020-01-28 RX ADMIN — CYCLOBENZAPRINE HYDROCHLORIDE 10 MG: 10 TABLET, FILM COATED ORAL at 14:05

## 2020-01-28 RX ADMIN — KETOROLAC TROMETHAMINE 10 MG: 10 TABLET, FILM COATED ORAL at 14:04

## 2020-01-28 NOTE — ED PROVIDER NOTES
Subjective   59-year-old male presents to the ED with a chief complaint of back pain.  Patient is complaining of pain from his sacrum up to L1.  States that it is been present for a long time and he has a history of chronic back pain but it seems to have been worse over the last month after he had a CT angiography of his vertebral arteries as they thought he had [place]'s syndrome/vertebrobasilar insufficiency. He describes it constant dull aching. Pain worse with movement. States that both of his legs feel numb from his hips down. States he can still feel them being touched. He denies loss of bowel or bladder control. No urinary retention of difficulty in urinating. Denies saddle anesthesia. Denies fever or chills. No IVDA. No cough sob or wheeze. No nausea vomiting diarrhea or abdominal pain. Also complains of headache. Describes posterior head achiness and fullness that has been bothering him for 1 month, since his prior CT. No trauma or injury. No change in vision or hearing. No other complaints at this time.           Review of Systems   Constitutional: Negative for fatigue and fever.   Gastrointestinal: Negative for abdominal pain, nausea and vomiting.   Musculoskeletal: Positive for back pain.   Neurological: Positive for numbness and headaches.   All other systems reviewed and are negative.      Past Medical History:   Diagnosis Date   • Anxiety    • Arthritis    • Asthma    • Brachial neuritis 1/19/2017   • Cellulitis     left arm and right leg    • Chest pain    • CHF (congestive heart failure) (CMS/[facility])    • COPD (chronic obstructive pulmonary disease) (CMS/[facility])    • Coronary artery disease    • Depression    • Diabetes mellitus (CMS/[facility])     diagnosed in 1998, checks fsbg 3-4x/day   • Dizziness    • Edema    • GERD (gastroesophageal reflux disease)    • H/O chest x-ray 07/04/2015    Mild Left base atelectasis   • H/O echocardiogram 07/05/2015    Normal LVSF. EF of 60-65%. Grade 1 diastolic dysfunction  of the LV myocardium. No evidence of pericardial effusion   • H/O exercise stress test    • History of herniated intervertebral disc     History of left L5-S1 disc herniation   • Hypertension    • LOM (loss of memory) 1/19/2017   • Lower back pain     Right   • Measles    • Neuropathy     feet    • EDD treated with BiPAP     setting 13 and 5   • Palpitations    • Pericardial effusion    • Renal disorder    • Seizure disorder (CMS/HCC)    • Seizures (CMS/HCC)     FOCAL last 2009   • Shortness of breath 1/19/2017   • SOB (shortness of breath)    • Staph infection     back after sugery at the incision site    • Stroke (CMS/HCC)     x2 most recent , slight weakness in hands occasionaly    • Wears glasses        Allergies   Allergen Reactions   • Sulfa Antibiotics Anaphylaxis, Nausea And Vomiting and Delirium   • Invokana [Canagliflozin] Other (See Comments)     DKA   • Codeine Nausea And Vomiting     Can only take with Phenergan   • Morphine Other (See Comments)     Does not work. It causes pain       Past Surgical History:   Procedure Laterality Date   • BACK SURGERY     • CARDIAC CATHETERIZATION     • CARDIAC CATHETERIZATION N/A 8/11/2017    Procedure: Coronary angiography;  Surgeon: Dallas Kim MD;  Location: [facility] CATH INVASIVE LOCATION;  Service:    • CARDIAC CATHETERIZATION N/A 8/11/2017    Procedure: Left Heart Cath;  Surgeon: Dallas Kim MD;  Location: [facility] CATH INVASIVE LOCATION;  Service:    • CARDIAC CATHETERIZATION N/A 8/11/2017    Procedure: Left ventriculography;  Surgeon: Dallas Kim MD;  Location: [facility] INVASIVE LOCATION;  Service:    • CARDIOVASCULAR STRESS TEST  07/03/2017    WITH DR KIM AT [facility]   • CARPAL TUNNEL RELEASE Right    • CATARACT EXTRACTION W/ INTRAOCULAR LENS IMPLANT Right 6/12/2017    Procedure: CATARACT PHACO EXTRACTION WITH INTRAOCULAR LENS IMPLANT RIGHT ;  Surgeon: Natalie Cao MD;  Location: [facility];  Service:    • CATARACT EXTRACTION W/  INTRAOCULAR LENS IMPLANT Left 7/10/2017    Procedure: CATARACT PHACO EXTRACTION WITH INTRAOCULAR LENS IMPLANT LEFT;  Surgeon: Natalie Cao MD;  Location:  [facility] OR;  Service:    • CHOLECYSTECTOMY     • CHOLECYSTECTOMY     • COLONOSCOPY     • CORONARY ANGIOPLASTY WITH STENT PLACEMENT      X1-LAD   • CORONARY ARTERY BYPASS GRAFT N/A 8/15/2017    Procedure: CORONARY ARTERY BYPASS GRAFTING x 3 UTILIZING THE LEFT INTERNAL MAMMARY ARTERY WITH ENDOSCOPIC VEIN HARVESTING OF THE RIGHT GREATER SAPHENOUS VEIN, HAMLET, LAD ENDARECTOMY;  Surgeon: London Damon MD;  Location:  [facility] OR;  Service:    • ENDOSCOPY     • EYE CAPSULOTOMY WITH LASER Right 11/25/2019    Procedure: EYE CAPSULOTOMY WITH LASER RIGHT;  Surgeon: Natalie Cao MD;  Location:  [facility] OR;  Service: Ophthalmology   • EYE CAPSULOTOMY WITH LASER Left 12/9/2019    Procedure: EYE CAPSULOTOMY WITH LASER LEFT;  Surgeon: Natalie Cao MD;  Location:  [facility] OR;  Service: Ophthalmology   • EYE SURGERY      cataract surgery both eyes   • INTERVENTIONAL RADIOLOGY PROCEDURE Bilateral 12/31/2019    Procedure: Carotid Cerebral Angiogram;  Surgeon: Damian Dubois MD;  Location: [facility] CATH INVASIVE LOCATION;  Service: Interventional Radiology   • KNEE ARTHROSCOPY Bilateral    • KNEE ARTHROSCOPY Right 2010    Dr Lewis   • KNEE ARTHROSCOPY Left 2008    Dr Jameson   • NEUROPLASTY / TRANSPOSITION ULNAR NERVE AT ELBOW     • OTHER SURGICAL HISTORY      Foraminotomy and discectomy   • PERICARDIAL WINDOW N/A 8/25/2017    Procedure: PERICARDIAL WINDOW;  Surgeon: Freeman Phillips MD;  Location:  [facility] OR;  Service:        Family History   Problem Relation Age of Onset   • Hypertension Mother    • Arthritis Mother    • Alcohol abuse Father    • Heart disease Other    • Stroke Other    • Hypertension Other    • Other Other         Neurologic disorder   • Parkinsonism Other    • Stroke Other    • Heart disease Other    • Hypertension Other    • Heart disease Other    • Stroke Other    •  Hypertension Other        Social History     Socioeconomic History   • Marital status:      Spouse name: Not on file   • Number of children: 1   • Years of education: Not on file   • Highest education level: Not on file   Occupational History   • Occupation: [employer]     Employer: DISABLED     Comment: Disabled since -Back Problems   Tobacco Use   • Smoking status: Former Smoker     Packs/day: 1.00     Years: 10.00     Pack years: 10.00     Types: Cigarettes     Last attempt to quit: 1998     Years since quittin.0   • Smokeless tobacco: Former User     Types: Snuff     Quit date:    Substance and Sexual Activity   • Alcohol use: Yes     Frequency: Monthly or less     Drinks per session: 3 or 4     Comment: 3 PER YEAR   • Drug use: No   • Sexual activity: Defer   Social History Narrative    Caffeine use: 0-1 serving daily.    Patient lives at home with wife.            Objective   Physical Exam   Constitutional: He is oriented to person, place, and time. No distress.   Obese   HENT:   Head: Normocephalic and atraumatic.   Nose: Nose normal.   Eyes: Pupils are equal, round, and reactive to light. Conjunctivae and EOM are normal.   Cardiovascular: Normal rate, regular rhythm and intact distal pulses.   Pulmonary/Chest: Effort normal and breath sounds normal. No respiratory distress.   Abdominal: Soft. He exhibits no distension. There is no tenderness.   Musculoskeletal: He exhibits no edema or deformity.   Bilateral lumbar paraspinal muscle tenderness to palpation.  Strength in the bilateral lower extremities is normal.  No hyperreflexia or hyporeflexia   Neurological: He is alert and oriented to person, place, and time. No cranial nerve deficit. Coordination normal.   Sensation is intact in the bilateral lower extremities.   Skin: He is not diaphoretic.   Nursing note and vitals reviewed.      Procedures           ED Course                                                MDM  59-year-old male with chronic back pain.  No red flags of back pain.  Post void residual here in the ED of 53.  He voided almost 500 cc of urine.  CT brain negative for acute process.  Patient is ambulating in the department without difficulty.  Will discharge with muscle relaxants and anti-inflammatories.  Strict return precautions given.  Follow-up with his surgeon.  Patient agreeable to this plan.        Final diagnoses:   Chronic bilateral low back pain, unspecified whether sciatica present   Nonintractable headache, unspecified chronicity pattern, unspecified headache type            Deep Avitia, DO  01/29/20 0007

## 2020-01-29 ENCOUNTER — OFFICE VISIT (OUTPATIENT)
Dept: ORTHOPEDIC SURGERY | Facility: CLINIC | Age: 60
End: 2020-01-29

## 2020-01-29 VITALS — WEIGHT: 310 LBS | BODY MASS INDEX: 44.38 KG/M2 | RESPIRATION RATE: 18 BRPM | HEIGHT: 70 IN

## 2020-01-29 DIAGNOSIS — M17.11 PRIMARY LOCALIZED OSTEOARTHRITIS OF RIGHT KNEE: Primary | ICD-10-CM

## 2020-01-29 DIAGNOSIS — M17.12 PRIMARY LOCALIZED OSTEOARTHRITIS OF LEFT KNEE: ICD-10-CM

## 2020-01-29 PROCEDURE — 20610 DRAIN/INJ JOINT/BURSA W/O US: CPT | Performed by: PHYSICIAN ASSISTANT

## 2020-01-29 NOTE — PROGRESS NOTES
Subjective   Denzel Harding is a 59 y.o. male here today for injection therapy.    Chief Complaint   Patient presents with   • Left Knee - Follow-up, Pain     Patient is here today for his 1st bilateral supartz injection.   • Right Knee - Follow-up, Pain   Patient presents for bilateral Visco supplementation injections.    Past Medical History:   Diagnosis Date   • Anxiety    • Arthritis    • Asthma    • Brachial neuritis 1/19/2017   • Cellulitis     left arm and right leg    • Chest pain    • CHF (congestive heart failure) (CMS/HCC)    • COPD (chronic obstructive pulmonary disease) (CMS/HCC)    • Coronary artery disease    • Depression    • Diabetes mellitus (CMS/HCC)     diagnosed in 1998, checks fsbg 3-4x/day   • Dizziness    • Edema    • GERD (gastroesophageal reflux disease)    • H/O chest x-ray 07/04/2015    Mild Left base atelectasis   • H/O echocardiogram 07/05/2015    Normal LVSF. EF of 60-65%. Grade 1 diastolic dysfunction of the LV myocardium. No evidence of pericardial effusion   • H/O exercise stress test    • History of herniated intervertebral disc     History of left L5-S1 disc herniation   • Hypertension    • LOM (loss of memory) 1/19/2017   • Lower back pain     Right   • Measles    • Neuropathy     feet    • EDD treated with BiPAP     setting 13 and 5   • Palpitations    • Pericardial effusion    • Renal disorder    • Seizure disorder (CMS/HCC)    • Seizures (CMS/HCC)     FOCAL last 2009   • Shortness of breath 1/19/2017   • SOB (shortness of breath)    • Staph infection     back after sugery at the incision site    • Stroke (CMS/HCC)     x2 most recent , slight weakness in hands occasionaly    • Wears glasses          Past Surgical History:   Procedure Laterality Date   • BACK SURGERY     • CARDIAC CATHETERIZATION     • CARDIAC CATHETERIZATION N/A 8/11/2017    Procedure: Coronary angiography;  Surgeon: Dallas Kim MD;  Location: [facility] CATH INVASIVE LOCATION;  Service:    • CARDIAC  CATHETERIZATION N/A 8/11/2017    Procedure: Left Heart Cath;  Surgeon: Dallas Hernandez MD;  Location: [facility] CATH INVASIVE LOCATION;  Service:    • CARDIAC CATHETERIZATION N/A 8/11/2017    Procedure: Left ventriculography;  Surgeon: Dallas Hernandez MD;  Location: [facility] CATH INVASIVE LOCATION;  Service:    • CARDIOVASCULAR STRESS TEST  07/03/2017    WITH DR HERNANDEZ AT [facility]   • CARPAL TUNNEL RELEASE Right    • CATARACT EXTRACTION W/ INTRAOCULAR LENS IMPLANT Right 6/12/2017    Procedure: CATARACT PHACO EXTRACTION WITH INTRAOCULAR LENS IMPLANT RIGHT ;  Surgeon: Natalie Cao MD;  Location: [facility] OR;  Service:    • CATARACT EXTRACTION W/ INTRAOCULAR LENS IMPLANT Left 7/10/2017    Procedure: CATARACT PHACO EXTRACTION WITH INTRAOCULAR LENS IMPLANT LEFT;  Surgeon: Natalie Cao MD;  Location: [facility] OR;  Service:    • CHOLECYSTECTOMY     • CHOLECYSTECTOMY     • COLONOSCOPY     • CORONARY ANGIOPLASTY WITH STENT PLACEMENT      X1-LAD   • CORONARY ARTERY BYPASS GRAFT N/A 8/15/2017    Procedure: CORONARY ARTERY BYPASS GRAFTING x 3 UTILIZING THE LEFT INTERNAL MAMMARY ARTERY WITH ENDOSCOPIC VEIN HARVESTING OF THE RIGHT GREATER SAPHENOUS VEIN, HAMLET, LAD ENDARECTOMY;  Surgeon: London Damon MD;  Location: [facility] OR;  Service:    • ENDOSCOPY     • EYE CAPSULOTOMY WITH LASER Right 11/25/2019    Procedure: EYE CAPSULOTOMY WITH LASER RIGHT;  Surgeon: Natalie Cao MD;  Location: [facility] OR;  Service: Ophthalmology   • EYE CAPSULOTOMY WITH LASER Left 12/9/2019    Procedure: EYE CAPSULOTOMY WITH LASER LEFT;  Surgeon: Natalie Cao MD;  Location: [facility] OR;  Service: Ophthalmology   • EYE SURGERY      cataract surgery both eyes   • INTERVENTIONAL RADIOLOGY PROCEDURE Bilateral 12/31/2019    Procedure: Carotid Cerebral Angiogram;  Surgeon: Damian Dubois MD;  Location: [facility] CATH INVASIVE LOCATION;  Service: Interventional Radiology   • KNEE ARTHROSCOPY Bilateral    • KNEE ARTHROSCOPY Right 2010    Dr Lewis   • KNEE ARTHROSCOPY  "Left 2008    Dr Jameson   • NEUROPLASTY / TRANSPOSITION ULNAR NERVE AT ELBOW     • OTHER SURGICAL HISTORY      Foraminotomy and discectomy   • PERICARDIAL WINDOW N/A 8/25/2017    Procedure: PERICARDIAL WINDOW;  Surgeon: Freeman Phillips MD;  Location: [facility] OR;  Service:        Allergies   Allergen Reactions   • Sulfa Antibiotics Anaphylaxis, Nausea And Vomiting and Delirium   • Invokana [Canagliflozin] Other (See Comments)     DKA   • Codeine Nausea And Vomiting     Can only take with Phenergan   • Morphine Other (See Comments)     Does not work. It causes pain       Objective   Resp 18   Ht 177.8 cm (70\")   Wt (!) 141 kg (310 lb)   BMI 44.48 kg/m²    Physical Exam     Skin exam stable with no erythema, ecchymosis or rash.  No new swelling.  No motor or sensory changes.  Distal pulse intact.    Large Joint Arthrocentesis: R knee  Date/Time: 1/29/2020 1:46 PM  Consent given by: patient  Site marked: site marked  Timeout: Immediately prior to procedure a time out was called to verify the correct patient, procedure, equipment, support staff and site/side marked as required   Supporting Documentation  Indications: pain   Procedure Details  Location: knee - R knee  Preparation: Patient was prepped and draped in the usual sterile fashion  Needle size: 22 G  Approach: anterolateral  Medications administered: 25 mg sodium hyaluronate 25 MG/2.5ML  Patient tolerance: patient tolerated the procedure well with no immediate complications    Large Joint Arthrocentesis: L knee  Date/Time: 1/29/2020 1:46 PM  Consent given by: patient  Site marked: site marked  Timeout: Immediately prior to procedure a time out was called to verify the correct patient, procedure, equipment, support staff and site/side marked as required   Supporting Documentation  Indications: pain   Procedure Details  Location: knee - L knee  Preparation: Patient was prepped and draped in the usual sterile fashion  Needle size: 22 G  Approach: " anterolateral  Medications administered: 25 mg sodium hyaluronate 25 MG/2.5ML  Patient tolerance: patient tolerated the procedure well with no immediate complications          Assessment/Plan      Diagnosis Plan   1. Primary localized osteoarthritis of right knee  Large Joint Arthrocentesis: R knee   2. Primary localized osteoarthritis of left knee  Large Joint Arthrocentesis: L knee       Discussion of orthopaedic goals and activities and patient and/or guardian expressed appreciation.  Guided on proper techniques for mobility, strength, agility and/or conditioning exercises  Ice, heat, and/or modalities as beneficial  Watch for signs and symptoms of infection  Call or notify for any adverse effect from injection therapy    Recommendations:  FU in 1 week    Patient agreeable to call or return sooner for any concerns.

## 2020-02-05 ENCOUNTER — OFFICE VISIT (OUTPATIENT)
Dept: ORTHOPEDIC SURGERY | Facility: CLINIC | Age: 60
End: 2020-02-05

## 2020-02-05 VITALS — RESPIRATION RATE: 18 BRPM | HEIGHT: 70 IN | WEIGHT: 310 LBS | BODY MASS INDEX: 44.38 KG/M2

## 2020-02-05 DIAGNOSIS — M17.12 PRIMARY LOCALIZED OSTEOARTHRITIS OF LEFT KNEE: ICD-10-CM

## 2020-02-05 DIAGNOSIS — M17.11 PRIMARY LOCALIZED OSTEOARTHROSIS OF THE KNEE, RIGHT: Primary | ICD-10-CM

## 2020-02-05 PROCEDURE — 20610 DRAIN/INJ JOINT/BURSA W/O US: CPT | Performed by: PHYSICIAN ASSISTANT

## 2020-02-05 RX ORDER — IBUPROFEN 800 MG/1
TABLET ORAL
COMMUNITY
Start: 2020-02-03 | End: 2020-04-27

## 2020-02-05 RX ORDER — METOCLOPRAMIDE 5 MG/1
TABLET ORAL
COMMUNITY
Start: 2020-02-03 | End: 2020-06-18

## 2020-02-05 RX ORDER — CLOPIDOGREL BISULFATE 75 MG/1
75 TABLET ORAL DAILY
COMMUNITY
Start: 2020-02-03 | End: 2020-12-03 | Stop reason: SDUPTHER

## 2020-02-05 NOTE — PROGRESS NOTES
Subjective   Denzel Harding is a 59 y.o. male here today for injection therapy.    Chief Complaint   Patient presents with   • Left Knee - Injections   • Right Knee - Injections     Supartz # 2   Patient presents for bilateral knee Supartz injection #2    Past Medical History:   Diagnosis Date   • Anxiety    • Arthritis    • Asthma    • Brachial neuritis 1/19/2017   • Cellulitis     left arm and right leg    • Chest pain    • CHF (congestive heart failure) (CMS/HCC)    • COPD (chronic obstructive pulmonary disease) (CMS/HCC)    • Coronary artery disease    • Depression    • Diabetes mellitus (CMS/HCC)     diagnosed in 1998, checks fsbg 3-4x/day   • Dizziness    • Edema    • GERD (gastroesophageal reflux disease)    • H/O chest x-ray 07/04/2015    Mild Left base atelectasis   • H/O echocardiogram 07/05/2015    Normal LVSF. EF of 60-65%. Grade 1 diastolic dysfunction of the LV myocardium. No evidence of pericardial effusion   • H/O exercise stress test    • History of herniated intervertebral disc     History of left L5-S1 disc herniation   • Hypertension    • LOM (loss of memory) 1/19/2017   • Lower back pain     Right   • Measles    • Neuropathy     feet    • EDD treated with BiPAP     setting 13 and 5   • Palpitations    • Pericardial effusion    • Renal disorder    • Seizure disorder (CMS/HCC)    • Seizures (CMS/HCC)     FOCAL last 2009   • Shortness of breath 1/19/2017   • SOB (shortness of breath)    • Staph infection     back after sugery at the incision site    • Stroke (CMS/HCC)     x2 most recent , slight weakness in hands occasionaly    • Wears glasses          Past Surgical History:   Procedure Laterality Date   • BACK SURGERY     • CARDIAC CATHETERIZATION     • CARDIAC CATHETERIZATION N/A 8/11/2017    Procedure: Coronary angiography;  Surgeon: Dallas Kim MD;  Location: [facility] CATH INVASIVE LOCATION;  Service:    • CARDIAC CATHETERIZATION N/A 8/11/2017    Procedure: Left Heart Cath;   Surgeon: Dallas Hernandez MD;  Location: [facility] CATH INVASIVE LOCATION;  Service:    • CARDIAC CATHETERIZATION N/A 8/11/2017    Procedure: Left ventriculography;  Surgeon: Dallas Hernandez MD;  Location: [facility] CATH INVASIVE LOCATION;  Service:    • CARDIOVASCULAR STRESS TEST  07/03/2017    WITH DR HERNANDEZ AT [facility]   • CARPAL TUNNEL RELEASE Right    • CATARACT EXTRACTION W/ INTRAOCULAR LENS IMPLANT Right 6/12/2017    Procedure: CATARACT PHACO EXTRACTION WITH INTRAOCULAR LENS IMPLANT RIGHT ;  Surgeon: Natalie Cao MD;  Location: [facility] OR;  Service:    • CATARACT EXTRACTION W/ INTRAOCULAR LENS IMPLANT Left 7/10/2017    Procedure: CATARACT PHACO EXTRACTION WITH INTRAOCULAR LENS IMPLANT LEFT;  Surgeon: Natalie Cao MD;  Location: [facility] OR;  Service:    • CHOLECYSTECTOMY     • CHOLECYSTECTOMY     • COLONOSCOPY     • CORONARY ANGIOPLASTY WITH STENT PLACEMENT      X1-LAD   • CORONARY ARTERY BYPASS GRAFT N/A 8/15/2017    Procedure: CORONARY ARTERY BYPASS GRAFTING x 3 UTILIZING THE LEFT INTERNAL MAMMARY ARTERY WITH ENDOSCOPIC VEIN HARVESTING OF THE RIGHT GREATER SAPHENOUS VEIN, HAMLET, LAD ENDARECTOMY;  Surgeon: London Damon MD;  Location:  [facility] OR;  Service:    • ENDOSCOPY     • EYE CAPSULOTOMY WITH LASER Right 11/25/2019    Procedure: EYE CAPSULOTOMY WITH LASER RIGHT;  Surgeon: Natalie Cao MD;  Location: [facility] OR;  Service: Ophthalmology   • EYE CAPSULOTOMY WITH LASER Left 12/9/2019    Procedure: EYE CAPSULOTOMY WITH LASER LEFT;  Surgeon: Natalie Cao MD;  Location: [facility] OR;  Service: Ophthalmology   • EYE SURGERY      cataract surgery both eyes   • INTERVENTIONAL RADIOLOGY PROCEDURE Bilateral 12/31/2019    Procedure: Carotid Cerebral Angiogram;  Surgeon: Damian Dubois MD;  Location: [facility] CATH INVASIVE LOCATION;  Service: Interventional Radiology   • KNEE ARTHROSCOPY Bilateral    • KNEE ARTHROSCOPY Right 2010    Dr Lewis   • KNEE ARTHROSCOPY Left 2008    Dr Jameson   • NEUROPLASTY / TRANSPOSITION ULNAR  "NERVE AT ELBOW     • OTHER SURGICAL HISTORY      Foraminotomy and discectomy   • PERICARDIAL WINDOW N/A 8/25/2017    Procedure: PERICARDIAL WINDOW;  Surgeon: Freeman Phillips MD;  Location: [facility] OR;  Service:        Allergies   Allergen Reactions   • Sulfa Antibiotics Anaphylaxis, Nausea And Vomiting and Delirium   • Invokana [Canagliflozin] Other (See Comments)     DKA   • Codeine Nausea And Vomiting     Can only take with Phenergan   • Morphine Other (See Comments)     Does not work. It causes pain       Objective   Resp 18   Ht 177.8 cm (70\")   Wt (!) 141 kg (310 lb)   BMI 44.48 kg/m²    Physical Exam     Skin exam stable with no erythema, ecchymosis or rash.  No new swelling.  No motor or sensory changes.  Distal pulse intact.    Large Joint Arthrocentesis: R knee  Date/Time: 2/5/2020 1:45 PM  Consent given by: patient  Site marked: site marked  Timeout: Immediately prior to procedure a time out was called to verify the correct patient, procedure, equipment, support staff and site/side marked as required   Supporting Documentation  Indications: pain   Procedure Details  Location: knee - R knee  Preparation: Patient was prepped and draped in the usual sterile fashion  Needle size: 22 G (21g)  Approach: anterolateral  Medications administered: 25 mg sodium hyaluronate 25 MG/2.5ML  Patient tolerance: patient tolerated the procedure well with no immediate complications    Large Joint Arthrocentesis: L knee  Date/Time: 2/5/2020 1:48 PM  Consent given by: patient  Site marked: site marked  Timeout: Immediately prior to procedure a time out was called to verify the correct patient, procedure, equipment, support staff and site/side marked as required   Supporting Documentation  Indications: pain   Procedure Details  Location: knee - L knee  Preparation: Patient was prepped and draped in the usual sterile fashion  Needle size: 22 G (21g)  Approach: anterolateral  Medications administered: 25 mg sodium hyaluronate 25 " MG/2.5ML  Patient tolerance: patient tolerated the procedure well with no immediate complications          Assessment/Plan      Diagnosis Plan   1. Primary localized osteoarthrosis of the knee, right  Large Joint Arthrocentesis: R knee   2. Primary localized osteoarthritis of left knee  Large Joint Arthrocentesis: L knee       Discussion of orthopaedic goals and activities and patient and/or guardian expressed appreciation.  Ice, heat, and/or modalities as beneficial  Watch for signs and symptoms of infection  Call or notify for any adverse effect from injection therapy    Recommendations:  Usual activities,  Follow up in 1 week    Patient agreeable to call or return sooner for any concerns.

## 2020-02-06 ENCOUNTER — TELEPHONE (OUTPATIENT)
Dept: NEUROSURGERY | Facility: CLINIC | Age: 60
End: 2020-02-06

## 2020-02-06 NOTE — TELEPHONE ENCOUNTER
Patient was called and there was no answer.     If possible, if someone could call patient and ask when this knot showed up, has not been present since the angiogram, if it has changed in any way, does not appear red or have discharge, or does not hurt to move his wrist.  Once we know that information we can give further advice

## 2020-02-06 NOTE — TELEPHONE ENCOUNTER
Provider: JOSE MIGUEL  Caller: WIFE  Time of call: 1314   Phone #: [phone]   Surgery: Carotid Cerebral Angiogram   Surgery Date: 12/31/2020  Last visit: 12/31/2020    Next visit: N/A     BOBBI:          Reason for call:     Patients wife left a voicemail on the clinical line.     She is calling to inform us that there is a knot on the patients arm in the same location that was used for the Angio access. She states that the knot is hard and is tender to the touch. They are concerned because this has not happened before, and would like a call back from the clinical staff.

## 2020-02-07 NOTE — TELEPHONE ENCOUNTER
"Spoke with pt, he states the knot showed up after the angiogram. Pt states he has had multiple heart caths in the past and the \"bump\" usually goes away pretty quick.     He c/o a little soreness with movement of the wrist. No redness, swelling or drainage. A little pink in color.       "

## 2020-02-07 NOTE — TELEPHONE ENCOUNTER
I called and spoke with patient.  He is not having any numbness, cool touch to his hand, no signs of fever or infection from knot.  He is just concerned due to the hard knot and how tender it is.  I gave him some suggestions on trying to decrease the tenderness of the knot.  I also told patient that I will call him on Monday when I talk with Dr. urbano to make sure that I am not missing anything else.

## 2020-02-12 DIAGNOSIS — Z98.62 S/P ANGIOPLASTY: Primary | ICD-10-CM

## 2020-02-12 DIAGNOSIS — I72.1 ANEURYSM OF ARTERY OF UPPER EXTREMITY (HCC): ICD-10-CM

## 2020-02-12 NOTE — TELEPHONE ENCOUNTER
Yes, I have just placed an order for a ultrasound of patients right wrist for evaluation for a pseudoanearsym due to angiogram. If you can schedule for patient, that would be greatly appreciated . Let me know if I can do anything

## 2020-02-17 ENCOUNTER — APPOINTMENT (OUTPATIENT)
Dept: MRI IMAGING | Facility: HOSPITAL | Age: 60
End: 2020-02-17

## 2020-02-17 ENCOUNTER — HOSPITAL ENCOUNTER (EMERGENCY)
Facility: HOSPITAL | Age: 60
Discharge: HOME | End: 2020-02-17
Attending: STUDENT IN AN ORGANIZED HEALTH CARE EDUCATION/TRAINING PROGRAM | Admitting: EMERGENCY MEDICINE

## 2020-02-17 ENCOUNTER — APPOINTMENT (OUTPATIENT)
Dept: CT IMAGING | Facility: HOSPITAL | Age: 60
End: 2020-02-17

## 2020-02-17 VITALS
HEIGHT: 70 IN | WEIGHT: 293.4 LBS | RESPIRATION RATE: 18 BRPM | SYSTOLIC BLOOD PRESSURE: 136 MMHG | BODY MASS INDEX: 42 KG/M2 | OXYGEN SATURATION: 94 % | TEMPERATURE: 98.3 F | DIASTOLIC BLOOD PRESSURE: 74 MMHG | HEART RATE: 105 BPM

## 2020-02-17 DIAGNOSIS — R55 SYNCOPE, UNSPECIFIED SYNCOPE TYPE: Primary | ICD-10-CM

## 2020-02-17 DIAGNOSIS — R73.9 HYPERGLYCEMIA: ICD-10-CM

## 2020-02-17 LAB
ALBUMIN SERPL-MCNC: 4.2 G/DL (ref 3.5–5.2)
ALBUMIN/GLOB SERPL: 1.4 G/DL
ALP SERPL-CCNC: 108 U/L (ref 39–117)
ALT SERPL W P-5'-P-CCNC: 93 U/L (ref 1–41)
ANION GAP SERPL CALCULATED.3IONS-SCNC: 12.1 MMOL/L (ref 5–15)
AST SERPL-CCNC: 101 U/L (ref 1–40)
BASOPHILS # BLD AUTO: 0.04 10*3/MM3 (ref 0–0.2)
BASOPHILS NFR BLD AUTO: 0.5 % (ref 0–1.5)
BILIRUB SERPL-MCNC: 1.1 MG/DL (ref 0.2–1.2)
BUN BLD-MCNC: 13 MG/DL (ref 6–20)
BUN/CREAT SERPL: 15.3 (ref 7–25)
CALCIUM SPEC-SCNC: 9.5 MG/DL (ref 8.6–10.5)
CHLORIDE SERPL-SCNC: 97 MMOL/L (ref 98–107)
CO2 SERPL-SCNC: 24.9 MMOL/L (ref 22–29)
CREAT BLD-MCNC: 0.85 MG/DL (ref 0.76–1.27)
DEPRECATED RDW RBC AUTO: 37.3 FL (ref 37–54)
EOSINOPHIL # BLD AUTO: 0.1 10*3/MM3 (ref 0–0.4)
EOSINOPHIL NFR BLD AUTO: 1.3 % (ref 0.3–6.2)
ERYTHROCYTE [DISTWIDTH] IN BLOOD BY AUTOMATED COUNT: 13.6 % (ref 12.3–15.4)
GFR SERPL CREATININE-BSD FRML MDRD: 93 ML/MIN/1.73
GLOBULIN UR ELPH-MCNC: 3 GM/DL
GLUCOSE BLD-MCNC: 350 MG/DL (ref 65–99)
HCT VFR BLD AUTO: 47 % (ref 37.5–51)
HGB BLD-MCNC: 15.6 G/DL (ref 13–17.7)
HOLD SPECIMEN: NORMAL
HOLD SPECIMEN: NORMAL
IMM GRANULOCYTES # BLD AUTO: 0.04 10*3/MM3 (ref 0–0.05)
IMM GRANULOCYTES NFR BLD AUTO: 0.5 % (ref 0–0.5)
LYMPHOCYTES # BLD AUTO: 1.51 10*3/MM3 (ref 0.7–3.1)
LYMPHOCYTES NFR BLD AUTO: 19.4 % (ref 19.6–45.3)
MCH RBC QN AUTO: 25.5 PG (ref 26.6–33)
MCHC RBC AUTO-ENTMCNC: 33.2 G/DL (ref 31.5–35.7)
MCV RBC AUTO: 76.8 FL (ref 79–97)
MONOCYTES # BLD AUTO: 0.39 10*3/MM3 (ref 0.1–0.9)
MONOCYTES NFR BLD AUTO: 5 % (ref 5–12)
NEUTROPHILS # BLD AUTO: 5.7 10*3/MM3 (ref 1.7–7)
NEUTROPHILS NFR BLD AUTO: 73.3 % (ref 42.7–76)
NRBC BLD AUTO-RTO: 0 /100 WBC (ref 0–0.2)
PLATELET # BLD AUTO: 205 10*3/MM3 (ref 140–450)
PMV BLD AUTO: 11 FL (ref 6–12)
POTASSIUM BLD-SCNC: 4 MMOL/L (ref 3.5–5.2)
PROT SERPL-MCNC: 7.2 G/DL (ref 6–8.5)
RBC # BLD AUTO: 6.12 10*6/MM3 (ref 4.14–5.8)
SODIUM BLD-SCNC: 134 MMOL/L (ref 136–145)
TROPONIN T SERPL-MCNC: 0.01 NG/ML (ref 0–0.03)
WBC NRBC COR # BLD: 7.78 10*3/MM3 (ref 3.4–10.8)
WHOLE BLOOD HOLD SPECIMEN: NORMAL
WHOLE BLOOD HOLD SPECIMEN: NORMAL

## 2020-02-17 PROCEDURE — 70450 CT HEAD/BRAIN W/O DYE: CPT

## 2020-02-17 PROCEDURE — 93005 ELECTROCARDIOGRAM TRACING: CPT

## 2020-02-17 PROCEDURE — 85025 COMPLETE CBC W/AUTO DIFF WBC: CPT | Performed by: PHYSICIAN ASSISTANT

## 2020-02-17 PROCEDURE — 84484 ASSAY OF TROPONIN QUANT: CPT | Performed by: PHYSICIAN ASSISTANT

## 2020-02-17 PROCEDURE — 80053 COMPREHEN METABOLIC PANEL: CPT | Performed by: PHYSICIAN ASSISTANT

## 2020-02-17 PROCEDURE — 99284 EMERGENCY DEPT VISIT MOD MDM: CPT

## 2020-02-17 PROCEDURE — 70551 MRI BRAIN STEM W/O DYE: CPT

## 2020-02-17 RX ORDER — SODIUM CHLORIDE 0.9 % (FLUSH) 0.9 %
10 SYRINGE (ML) INJECTION AS NEEDED
Status: DISCONTINUED | OUTPATIENT
Start: 2020-02-17 | End: 2020-02-17 | Stop reason: HOSPADM

## 2020-02-17 RX ORDER — HYDROCODONE BITARTRATE AND ACETAMINOPHEN 5; 325 MG/1; MG/1
1 TABLET ORAL ONCE
Status: COMPLETED | OUTPATIENT
Start: 2020-02-17 | End: 2020-02-17

## 2020-02-17 RX ADMIN — HYDROCODONE BITARTRATE AND ACETAMINOPHEN 1 TABLET: 5; 325 TABLET ORAL at 21:19

## 2020-02-17 NOTE — ED PROVIDER NOTES
Subjective   59-year-old male presents after an episode of passing out.  He states he was working on his car earlier today, he bought a henrik to lift up his car from a neighbor.  He states he remember his neighbor unloading the henrik, and the next thing he remembers is his wife coming home from running errands and finding him laying on the ground.  He does not know how long he was out, he states that he apparently had jacked the car up and positioned it, and was laying on his side at the back of the car.  His wife states that he does have a history of strokes, coronary disease, hypertension, hyperlipidemia, and a previous smoker.  He states he feels weak all over.      History provided by:  Patient   used: No        Review of Systems   Neurological: Positive for syncope, weakness and numbness.   All other systems reviewed and are negative.      Past Medical History:   Diagnosis Date   • Anxiety    • Arthritis    • Asthma    • Brachial neuritis 1/19/2017   • Cellulitis     left arm and right leg    • Chest pain    • CHF (congestive heart failure) (CMS/[facility])    • COPD (chronic obstructive pulmonary disease) (CMS/[facility])    • Coronary artery disease    • Depression    • Diabetes mellitus (CMS/[facility])     diagnosed in 1998, checks fsbg 3-4x/day   • Dizziness    • Edema    • GERD (gastroesophageal reflux disease)    • H/O chest x-ray 07/04/2015    Mild Left base atelectasis   • H/O echocardiogram 07/05/2015    Normal LVSF. EF of 60-65%. Grade 1 diastolic dysfunction of the LV myocardium. No evidence of pericardial effusion   • H/O exercise stress test    • History of herniated intervertebral disc     History of left L5-S1 disc herniation   • Hypertension    • LOM (loss of memory) 1/19/2017   • Lower back pain     Right   • Measles    • Neuropathy     feet    • EDD treated with BiPAP     setting 13 and 5   • Palpitations    • Pericardial effusion    • Renal disorder    • Seizure disorder (CMS/[facility])    • Seizures  (CMS/[facility])     FOCAL last 2009   • Shortness of breath 1/19/2017   • SOB (shortness of breath)    • Staph infection     back after sugery at the incision site    • Stroke (CMS/[facility])     x2 most recent , slight weakness in hands occasionaly    • Wears glasses        Allergies   Allergen Reactions   • Sulfa Antibiotics Anaphylaxis, Nausea And Vomiting and Delirium   • Invokana [Canagliflozin] Other (See Comments)     DKA   • Codeine Nausea And Vomiting     Can only take with Phenergan   • Morphine Other (See Comments)     Does not work. It causes pain       Past Surgical History:   Procedure Laterality Date   • BACK SURGERY     • CARDIAC CATHETERIZATION     • CARDIAC CATHETERIZATION N/A 8/11/2017    Procedure: Coronary angiography;  Surgeon: Dallas Kim MD;  Location: [facility] CATH INVASIVE LOCATION;  Service:    • CARDIAC CATHETERIZATION N/A 8/11/2017    Procedure: Left Heart Cath;  Surgeon: Dallas Kim MD;  Location: [facility] CATH INVASIVE LOCATION;  Service:    • CARDIAC CATHETERIZATION N/A 8/11/2017    Procedure: Left ventriculography;  Surgeon: Dallas Kim MD;  Location: [facility] CATH INVASIVE LOCATION;  Service:    • CARDIOVASCULAR STRESS TEST  07/03/2017    WITH DR KIM AT [facility]   • CARPAL TUNNEL RELEASE Right    • CATARACT EXTRACTION W/ INTRAOCULAR LENS IMPLANT Right 6/12/2017    Procedure: CATARACT PHACO EXTRACTION WITH INTRAOCULAR LENS IMPLANT RIGHT ;  Surgeon: Natalie Cao MD;  Location: [facility] OR;  Service:    • CATARACT EXTRACTION W/ INTRAOCULAR LENS IMPLANT Left 7/10/2017    Procedure: CATARACT PHACO EXTRACTION WITH INTRAOCULAR LENS IMPLANT LEFT;  Surgeon: Natalie Cao MD;  Location: [facility] OR;  Service:    • CHOLECYSTECTOMY     • CHOLECYSTECTOMY     • COLONOSCOPY     • CORONARY ANGIOPLASTY WITH STENT PLACEMENT      X1-LAD   • CORONARY ARTERY BYPASS GRAFT N/A 8/15/2017    Procedure: CORONARY ARTERY BYPASS GRAFTING x 3 UTILIZING THE LEFT INTERNAL MAMMARY ARTERY WITH ENDOSCOPIC VEIN  HARVESTING OF THE RIGHT GREATER SAPHENOUS VEIN, HAMLET, LAD ENDARECTOMY;  Surgeon: London Damon MD;  Location:  [facility] OR;  Service:    • ENDOSCOPY     • EYE CAPSULOTOMY WITH LASER Right 11/25/2019    Procedure: EYE CAPSULOTOMY WITH LASER RIGHT;  Surgeon: Natalie Cao MD;  Location:  [facility] OR;  Service: Ophthalmology   • EYE CAPSULOTOMY WITH LASER Left 12/9/2019    Procedure: EYE CAPSULOTOMY WITH LASER LEFT;  Surgeon: Natalie Cao MD;  Location:  [facility] OR;  Service: Ophthalmology   • EYE SURGERY      cataract surgery both eyes   • INTERVENTIONAL RADIOLOGY PROCEDURE Bilateral 12/31/2019    Procedure: Carotid Cerebral Angiogram;  Surgeon: Damian Dubois MD;  Location:  [facility] CATH INVASIVE LOCATION;  Service: Interventional Radiology   • KNEE ARTHROSCOPY Bilateral    • KNEE ARTHROSCOPY Right 2010    Dr Lewis   • KNEE ARTHROSCOPY Left 2008    Dr Jameson   • NEUROPLASTY / TRANSPOSITION ULNAR NERVE AT ELBOW     • OTHER SURGICAL HISTORY      Foraminotomy and discectomy   • PERICARDIAL WINDOW N/A 8/25/2017    Procedure: PERICARDIAL WINDOW;  Surgeon: Freeman Phillips MD;  Location:  [facility] OR;  Service:        Family History   Problem Relation Age of Onset   • Hypertension Mother    • Arthritis Mother    • Alcohol abuse Father    • Heart disease Other    • Stroke Other    • Hypertension Other    • Other Other         Neurologic disorder   • Parkinsonism Other    • Stroke Other    • Heart disease Other    • Hypertension Other    • Heart disease Other    • Stroke Other    • Hypertension Other        Social History     Socioeconomic History   • Marital status:      Spouse name: Not on file   • Number of children: 1   • Years of education: Not on file   • Highest education level: Not on file   Occupational History   • Occupation: [employer]     Employer: DISABLED     Comment: Disabled since 2002-Back Problems   Tobacco Use   • Smoking status: Former Smoker     Packs/day: 1.00     Years: 10.00     Pack  years: 10.00     Types: Cigarettes     Last attempt to quit: 1998     Years since quittin.1   • Smokeless tobacco: Former User     Types: Snuff     Quit date:    Substance and Sexual Activity   • Alcohol use: Yes     Frequency: Monthly or less     Drinks per session: 3 or 4     Comment: 3 PER YEAR   • Drug use: No   • Sexual activity: Defer   Social History Narrative    Caffeine use: 0-1 serving daily.    Patient lives at home with wife.            Objective   Physical Exam   Constitutional: He is oriented to person, place, and time. He appears well-developed and well-nourished.   HENT:   Head: Normocephalic and atraumatic.   Eyes: Pupils are equal, round, and reactive to light. EOM are normal.   Neck: Normal range of motion. Neck supple.   Cardiovascular: Normal rate and regular rhythm.   Pulmonary/Chest: Effort normal and breath sounds normal.   Abdominal: Soft. Bowel sounds are normal.   Musculoskeletal: He exhibits no tenderness or deformity.   Neurological: He is alert and oriented to person, place, and time. No cranial nerve deficit.   Decreased strength in left leg compared to right, unable to lift as high or hold as long.  Equal sensation bilaterally   Skin: Skin is warm and dry.   Psychiatric: He has a normal mood and affect. His behavior is normal.   Nursing note and vitals reviewed.      Procedures           ED Course  ED Course as of  131   Mon 2020   1820 EKG shows sinus tachycardia with a rate of 108.  Findings concerning for right ventricular hypertrophy.  Nonspecific T waves.  Normal EKG.  Interpreted by me.    [DT]    Called To the patient's room because he mentioned tailbone pain, he did not mention this in his history and physical.  I offered him x-ray but explained that even with a tailbone fracture its symptomatic treatment, he understood, offered him something for pain which he agreed upon discharge.    [CS]      ED Course User Index  [CS] Rohit Kim Jr.,  HAILEY  [DT] Ottoniel Willson MD                                           MDM  Number of Diagnoses or Management Options  Hyperglycemia: new and requires workup  Syncope, unspecified syncope type: new and requires workup     Amount and/or Complexity of Data Reviewed  Clinical lab tests: reviewed  Tests in the radiology section of CPT®: reviewed  Decide to obtain previous medical records or to obtain history from someone other than the patient: yes    Risk of Complications, Morbidity, and/or Mortality  Presenting problems: low  Diagnostic procedures: low  Management options: low    Patient Progress  Patient progress: stable      Final diagnoses:   Syncope, unspecified syncope type   Hyperglycemia            Rohit Kim Jr., HAILEY  02/17/20 8240       Rohit Kim Jr., PA-C  02/20/20 4534

## 2020-02-18 ENCOUNTER — OFFICE VISIT (OUTPATIENT)
Dept: ORTHOPEDIC SURGERY | Facility: CLINIC | Age: 60
End: 2020-02-18

## 2020-02-18 VITALS — RESPIRATION RATE: 18 BRPM | HEIGHT: 70 IN | WEIGHT: 293 LBS | BODY MASS INDEX: 41.95 KG/M2

## 2020-02-18 DIAGNOSIS — M25.561 ARTHRALGIA OF BOTH KNEES: Primary | ICD-10-CM

## 2020-02-18 DIAGNOSIS — M17.12 PRIMARY LOCALIZED OSTEOARTHRITIS OF LEFT KNEE: ICD-10-CM

## 2020-02-18 DIAGNOSIS — M25.562 ARTHRALGIA OF BOTH KNEES: Primary | ICD-10-CM

## 2020-02-18 DIAGNOSIS — M17.11 PRIMARY LOCALIZED OSTEOARTHROSIS OF THE KNEE, RIGHT: ICD-10-CM

## 2020-02-18 PROCEDURE — 20610 DRAIN/INJ JOINT/BURSA W/O US: CPT | Performed by: PHYSICIAN ASSISTANT

## 2020-02-18 NOTE — PROGRESS NOTES
Subjective   Denzel Harding is a 59 y.o. male here today for injection therapy.    Chief Complaint   Patient presents with   • Right Knee - Injections   • Left Knee - Injections     Patient here today for Supartz #3 bilateral knee     Patient presents for third Supartz into the bilateral knee.  Past Medical History:   Diagnosis Date   • Anxiety    • Arthritis    • Asthma    • Brachial neuritis 1/19/2017   • Cellulitis     left arm and right leg    • Chest pain    • CHF (congestive heart failure) (CMS/HCC)    • COPD (chronic obstructive pulmonary disease) (CMS/HCC)    • Coronary artery disease    • Depression    • Diabetes mellitus (CMS/HCC)     diagnosed in 1998, checks fsbg 3-4x/day   • Dizziness    • Edema    • GERD (gastroesophageal reflux disease)    • H/O chest x-ray 07/04/2015    Mild Left base atelectasis   • H/O echocardiogram 07/05/2015    Normal LVSF. EF of 60-65%. Grade 1 diastolic dysfunction of the LV myocardium. No evidence of pericardial effusion   • H/O exercise stress test    • History of herniated intervertebral disc     History of left L5-S1 disc herniation   • Hypertension    • LOM (loss of memory) 1/19/2017   • Lower back pain     Right   • Measles    • Neuropathy     feet    • EDD treated with BiPAP     setting 13 and 5   • Palpitations    • Pericardial effusion    • Renal disorder    • Seizure disorder (CMS/HCC)    • Seizures (CMS/HCC)     FOCAL last 2009   • Shortness of breath 1/19/2017   • SOB (shortness of breath)    • Staph infection     back after sugery at the incision site    • Stroke (CMS/HCC)     x2 most recent , slight weakness in hands occasionaly    • Wears glasses         Past Surgical History:   Procedure Laterality Date   • BACK SURGERY     • CARDIAC CATHETERIZATION     • CARDIAC CATHETERIZATION N/A 8/11/2017    Procedure: Coronary angiography;  Surgeon: Dallas Kim MD;  Location: [facility] CATH INVASIVE LOCATION;  Service:    • CARDIAC CATHETERIZATION N/A 8/11/2017     Procedure: Left Heart Cath;  Surgeon: Dallas Hernandze MD;  Location: [facility] CATH INVASIVE LOCATION;  Service:    • CARDIAC CATHETERIZATION N/A 8/11/2017    Procedure: Left ventriculography;  Surgeon: Dallas Hernandez MD;  Location: [facility] CATH INVASIVE LOCATION;  Service:    • CARDIOVASCULAR STRESS TEST  07/03/2017    WITH DR HERNANDEZ AT [facility]   • CARPAL TUNNEL RELEASE Right    • CATARACT EXTRACTION W/ INTRAOCULAR LENS IMPLANT Right 6/12/2017    Procedure: CATARACT PHACO EXTRACTION WITH INTRAOCULAR LENS IMPLANT RIGHT ;  Surgeon: Natalie Cao MD;  Location: [facility] OR;  Service:    • CATARACT EXTRACTION W/ INTRAOCULAR LENS IMPLANT Left 7/10/2017    Procedure: CATARACT PHACO EXTRACTION WITH INTRAOCULAR LENS IMPLANT LEFT;  Surgeon: Natalie Cao MD;  Location: [facility] OR;  Service:    • CHOLECYSTECTOMY     • CHOLECYSTECTOMY     • COLONOSCOPY     • CORONARY ANGIOPLASTY WITH STENT PLACEMENT      X1-LAD   • CORONARY ARTERY BYPASS GRAFT N/A 8/15/2017    Procedure: CORONARY ARTERY BYPASS GRAFTING x 3 UTILIZING THE LEFT INTERNAL MAMMARY ARTERY WITH ENDOSCOPIC VEIN HARVESTING OF THE RIGHT GREATER SAPHENOUS VEIN, HAMLET, LAD ENDARECTOMY;  Surgeon: London Damon MD;  Location: [facility] OR;  Service:    • ENDOSCOPY     • EYE CAPSULOTOMY WITH LASER Right 11/25/2019    Procedure: EYE CAPSULOTOMY WITH LASER RIGHT;  Surgeon: Natalie Cao MD;  Location: [facility] OR;  Service: Ophthalmology   • EYE CAPSULOTOMY WITH LASER Left 12/9/2019    Procedure: EYE CAPSULOTOMY WITH LASER LEFT;  Surgeon: Natalie Cao MD;  Location: [facility] OR;  Service: Ophthalmology   • EYE SURGERY      cataract surgery both eyes   • INTERVENTIONAL RADIOLOGY PROCEDURE Bilateral 12/31/2019    Procedure: Carotid Cerebral Angiogram;  Surgeon: Damian Dubois MD;  Location: [facility] CATH INVASIVE LOCATION;  Service: Interventional Radiology   • KNEE ARTHROSCOPY Bilateral    • KNEE ARTHROSCOPY Right 2010    Dr Lewis   • KNEE ARTHROSCOPY Left 2008    Dr Jameson   •  "NEUROPLASTY / TRANSPOSITION ULNAR NERVE AT ELBOW     • OTHER SURGICAL HISTORY      Foraminotomy and discectomy   • PERICARDIAL WINDOW N/A 8/25/2017    Procedure: PERICARDIAL WINDOW;  Surgeon: Freeman Phillips MD;  Location: [facility] OR;  Service:        Allergies   Allergen Reactions   • Sulfa Antibiotics Anaphylaxis, Nausea And Vomiting and Delirium   • Invokana [Canagliflozin] Other (See Comments)     DKA   • Codeine Nausea And Vomiting     Can only take with Phenergan   • Morphine Other (See Comments)     Does not work. It causes pain       Objective   Resp 18   Ht 177.8 cm (70\")   Wt 133 kg (293 lb)   BMI 42.04 kg/m²    Physical Exam    Skin exam stable with no erythema, ecchymosis or rash.  No new swelling.  No motor or sensory changes.  Distal pulse intact.    Large Joint Arthrocentesis: R knee  Date/Time: 2/18/2020 10:46 AM  Consent given by: patient  Site marked: site marked  Timeout: Immediately prior to procedure a time out was called to verify the correct patient, procedure, equipment, support staff and site/side marked as required   Supporting Documentation  Indications: pain   Procedure Details  Location: knee - R knee  Preparation: Patient was prepped and draped in the usual sterile fashion  Needle size: 22 G (21 G)  Approach: anterolateral  Medications administered: 25 mg sodium hyaluronate 25 MG/2.5ML  Patient tolerance: patient tolerated the procedure well with no immediate complications    Large Joint Arthrocentesis: L knee  Date/Time: 2/18/2020 10:46 AM  Consent given by: patient  Site marked: site marked  Timeout: Immediately prior to procedure a time out was called to verify the correct patient, procedure, equipment, support staff and site/side marked as required   Supporting Documentation  Indications: pain   Procedure Details  Location: knee - L knee  Preparation: Patient was prepped and draped in the usual sterile fashion  Needle size: 22 G (21 G)  Approach: anterolateral  Medications " administered: 25 mg sodium hyaluronate 25 MG/2.5ML  Patient tolerance: patient tolerated the procedure well with no immediate complications          Assessment/Plan      Diagnosis Plan   1. Arthralgia of both knees  Large Joint Arthrocentesis: R knee    Large Joint Arthrocentesis: L knee   2. Primary localized osteoarthrosis of the knee, right  Large Joint Arthrocentesis: R knee    Large Joint Arthrocentesis: L knee   3. Primary localized osteoarthritis of left knee  Large Joint Arthrocentesis: R knee    Large Joint Arthrocentesis: L knee       No complications of injection noted.    Discussion of orthopaedic goals and activities and patient and/or guardian expressed appreciation. Call or notify for any adverse effect from injection therapy. Ice, heat, and/or modalities as beneficial. Watch for signs and symptoms of infection.    Recommendations:  Work/Activity Status: May perform usual activities as tolerated. May return to routine exercise and physical work as tolerated. No strenuous activity.  Patient and/or guardian is encouraged to call or return for any issues or concerns.    Follow up in 1 week    Patient agreeable to call or return sooner for any concerns.

## 2020-02-19 ENCOUNTER — OFFICE VISIT (OUTPATIENT)
Dept: NEUROSURGERY | Facility: CLINIC | Age: 60
End: 2020-02-19

## 2020-02-19 ENCOUNTER — HOSPITAL ENCOUNTER (OUTPATIENT)
Dept: CARDIOLOGY | Facility: HOSPITAL | Age: 60
Discharge: HOME | End: 2020-02-19
Admitting: PHYSICIAN ASSISTANT

## 2020-02-19 VITALS — BODY MASS INDEX: 41.95 KG/M2 | WEIGHT: 293 LBS | HEIGHT: 70 IN

## 2020-02-19 VITALS
TEMPERATURE: 97.6 F | SYSTOLIC BLOOD PRESSURE: 124 MMHG | DIASTOLIC BLOOD PRESSURE: 70 MMHG | WEIGHT: 301 LBS | BODY MASS INDEX: 43.09 KG/M2 | HEIGHT: 70 IN

## 2020-02-19 DIAGNOSIS — Z98.62 S/P ANGIOPLASTY: ICD-10-CM

## 2020-02-19 DIAGNOSIS — Z98.62 S/P ANGIOPLASTY: Primary | ICD-10-CM

## 2020-02-19 DIAGNOSIS — I72.1 ANEURYSM OF ARTERY OF UPPER EXTREMITY (HCC): ICD-10-CM

## 2020-02-19 LAB
BH CV ECHO MEAS - BSA(HAYCOCK): 2.6 M^2
BH CV ECHO MEAS - BSA: 2.5 M^2
BH CV ECHO MEAS - BZI_BMI: 42 KILOGRAMS/M^2
BH CV ECHO MEAS - BZI_METRIC_HEIGHT: 177.8 CM
BH CV ECHO MEAS - BZI_METRIC_WEIGHT: 132.9 KG
BH CV UPPER DUPLEX RIGHT AXILLARY ARTERY PSV: 60 CM/S
BH CV UPPER DUPLEX RIGHT BRACHIAL ART PSV: 99 CM/S
BH CV UPPER DUPLEX RIGHT RADIAL ART PSV: 58 CM/S
BH CV UPPER DUPLEX RIGHT SUBCLAVIAN ART PSV: 72 CM/S
BH CV UPPER DUPLEX RIGHT ULNAR ART PSV: 146 CM/S
BH CV XLRA MEAS RIGHT PROX SCLA PSV: -75.5 CM/SEC

## 2020-02-19 PROCEDURE — 93931 UPPER EXTREMITY STUDY: CPT

## 2020-02-19 PROCEDURE — 93931 UPPER EXTREMITY STUDY: CPT | Performed by: INTERNAL MEDICINE

## 2020-02-19 PROCEDURE — 99214 OFFICE O/P EST MOD 30 MIN: CPT | Performed by: PHYSICIAN ASSISTANT

## 2020-02-19 NOTE — PROGRESS NOTES
"NAME: MIKY HASSAN   DOS: 2020  : 1960  PCP: Isaura Godoy MD    Chief Complaint:    Chief Complaint   Patient presents with   • Follow-up       History of Present Illness:  59 y.o. male who is known to the neuro interventional service due to undergoing evaluation due to dizziness and near syncope when turning his head either left or right in 2019 and was found to have no evidence of \"[place] Massimo's syndrome\" or vertebral insufficiency.  He does have a significant past medical history of diabetes, coronary artery disease, prior stroke, and COPD.   Patient is a non-smoker.     Today, patient is seen due to concern of his radial access incision.  He notes that it has been hard as a knot and tender to touch, but has not had any purulence, or decrease sensation or numbness into his hand.  He does note about 3 to 6 inches proximal from incision site he is tender to palpation.  But notes has no pain with any arm elbow or wrist movement.  He states in his previous cardiac catheterizations he has never had similar symptoms.  Denies any other signs of infection, including fever.  He just notes concern due to his previous experiences.    In addition, patient stated at this appointment that he had recently went to the emergency room 2 days prior due to passing out and experiencing left-sided weakness and numbness.  He notes that he is not sure on how long he was passed out.  Patient states that he is continuing to have residual symptoms.  His wife also notes that he has been having a \"thick tongue\" since the episode.  Patient has remained on aspirin 325 mg an day high dose statin.  Patient states that he has not taken Plavix for the past 3 years due to his cardiologist deseeding this medication.  Patient has an appointment set up with a neurologist in the upcoming month.      Patient had no other concerns at this appointment.      Past Medical History:  Past Medical History:   Diagnosis Date   • Anxiety "    • Arthritis    • Asthma    • Brachial neuritis 1/19/2017   • Cellulitis     left arm and right leg    • Chest pain    • CHF (congestive heart failure) (CMS/[facility])    • COPD (chronic obstructive pulmonary disease) (CMS/[facility])    • Coronary artery disease    • Depression    • Diabetes mellitus (CMS/[facility])     diagnosed in 1998, checks fsbg 3-4x/day   • Dizziness    • Edema    • GERD (gastroesophageal reflux disease)    • H/O chest x-ray 07/04/2015    Mild Left base atelectasis   • H/O echocardiogram 07/05/2015    Normal LVSF. EF of 60-65%. Grade 1 diastolic dysfunction of the LV myocardium. No evidence of pericardial effusion   • H/O exercise stress test    • History of herniated intervertebral disc     History of left L5-S1 disc herniation   • Hypertension    • LOM (loss of memory) 1/19/2017   • Lower back pain     Right   • Measles    • Neuropathy     feet    • EDD treated with BiPAP     setting 13 and 5   • Palpitations    • Pericardial effusion    • Renal disorder    • Seizure disorder (CMS/[facility])    • Seizures (CMS/[facility])     FOCAL last 2009   • Shortness of breath 1/19/2017   • SOB (shortness of breath)    • Staph infection     back after sugery at the incision site    • Stroke (CMS/[facility])     x2 most recent , slight weakness in hands occasionaly    • Wears glasses        Past Surgical History:  Past Surgical History:   Procedure Laterality Date   • BACK SURGERY     • CARDIAC CATHETERIZATION     • CARDIAC CATHETERIZATION N/A 8/11/2017    Procedure: Coronary angiography;  Surgeon: Dallas Kim MD;  Location: [facility] CATH INVASIVE LOCATION;  Service:    • CARDIAC CATHETERIZATION N/A 8/11/2017    Procedure: Left Heart Cath;  Surgeon: Dallas Kim MD;  Location: [facility] CATH INVASIVE LOCATION;  Service:    • CARDIAC CATHETERIZATION N/A 8/11/2017    Procedure: Left ventriculography;  Surgeon: Dallas Kim MD;  Location: [facility] CATH INVASIVE LOCATION;  Service:    • CARDIOVASCULAR STRESS TEST  07/03/2017     WITH DR HERNANDEZ AT [facility]   • CARPAL TUNNEL RELEASE Right    • CATARACT EXTRACTION W/ INTRAOCULAR LENS IMPLANT Right 6/12/2017    Procedure: CATARACT PHACO EXTRACTION WITH INTRAOCULAR LENS IMPLANT RIGHT ;  Surgeon: Natalie Cao MD;  Location: [facility] OR;  Service:    • CATARACT EXTRACTION W/ INTRAOCULAR LENS IMPLANT Left 7/10/2017    Procedure: CATARACT PHACO EXTRACTION WITH INTRAOCULAR LENS IMPLANT LEFT;  Surgeon: Natalie Cao MD;  Location: [facility] OR;  Service:    • CHOLECYSTECTOMY     • CHOLECYSTECTOMY     • COLONOSCOPY     • CORONARY ANGIOPLASTY WITH STENT PLACEMENT      X1-LAD   • CORONARY ARTERY BYPASS GRAFT N/A 8/15/2017    Procedure: CORONARY ARTERY BYPASS GRAFTING x 3 UTILIZING THE LEFT INTERNAL MAMMARY ARTERY WITH ENDOSCOPIC VEIN HARVESTING OF THE RIGHT GREATER SAPHENOUS VEIN, HAMLET, LAD ENDARECTOMY;  Surgeon: London Damon MD;  Location:  [facility] OR;  Service:    • ENDOSCOPY     • EYE CAPSULOTOMY WITH LASER Right 11/25/2019    Procedure: EYE CAPSULOTOMY WITH LASER RIGHT;  Surgeon: Natalie Cao MD;  Location: [facility] OR;  Service: Ophthalmology   • EYE CAPSULOTOMY WITH LASER Left 12/9/2019    Procedure: EYE CAPSULOTOMY WITH LASER LEFT;  Surgeon: Natalie Cao MD;  Location: [facility] OR;  Service: Ophthalmology   • EYE SURGERY      cataract surgery both eyes   • INTERVENTIONAL RADIOLOGY PROCEDURE Bilateral 12/31/2019    Procedure: Carotid Cerebral Angiogram;  Surgeon: Damian Dubois MD;  Location: [facility] CATH INVASIVE LOCATION;  Service: Interventional Radiology   • KNEE ARTHROSCOPY Bilateral    • KNEE ARTHROSCOPY Right 2010    Dr Lewis   • KNEE ARTHROSCOPY Left 2008    Dr Jameson   • NEUROPLASTY / TRANSPOSITION ULNAR NERVE AT ELBOW     • OTHER SURGICAL HISTORY      Foraminotomy and discectomy   • PERICARDIAL WINDOW N/A 8/25/2017    Procedure: PERICARDIAL WINDOW;  Surgeon: Freeman Phillips MD;  Location:  [facility] OR;  Service:        Review of Systems:        Review of Systems   Constitutional: Positive for  diaphoresis and fatigue.   HENT: Positive for ear pain and voice change.    Endocrine: Positive for cold intolerance.   Genitourinary: Positive for frequency.   Musculoskeletal: Positive for back pain, gait problem and myalgias.   Neurological: Positive for light-headedness.   All other systems reviewed and are negative.       Medications    Current Outpatient Medications:   •  acetaminophen (TYLENOL) 500 MG tablet, Take 1,000 mg by mouth Every 6 (Six) Hours As Needed for Mild Pain ., Disp: , Rfl:   •  amitriptyline (ELAVIL) 25 MG tablet, take 3 tablets by mouth at bedtime (Patient taking differently: Take 75 mg by mouth Every Night.), Disp: 270 tablet, Rfl: 0  •  amLODIPine (NORVASC) 5 MG tablet, TAKE 1/2 TABLET BY MOUTH DAILY (Patient taking differently: Take 5 mg by mouth Daily.), Disp: 45 tablet, Rfl: 3  •  ASMANEX  MCG/ACT aerosol, Inhale 1 puff 2 (Two) Times a Day. (Patient taking differently: Inhale 2 puffs 2 (Two) Times a Day.), Disp: 1 inhaler, Rfl: 5  •  aspirin 325 MG tablet, Take 1 tablet by mouth Daily., Disp: , Rfl:   •  atorvastatin (LIPITOR) 80 MG tablet, Take 1 tablet by mouth Every Night., Disp: 30 tablet, Rfl: 0  •  azelastine (ASTELIN) 0.1 % nasal spray, 1 spray into the nostril(s) as directed by provider 2 (Two) Times a Day As Needed for Rhinitis or Allergies. Use in each nostril as directed, Disp: 1 each, Rfl: 5  •  budesonide-formoterol (SYMBICORT) 80-4.5 MCG/ACT inhaler, Inhale 2 puffs 2 (Two) Times a Day. Rinse mouth with water after use., Disp: 1 inhaler, Rfl: 5  •  bumetanide (BUMEX) 2 MG tablet, Take 1 tablet by mouth Daily. Take extra 2mg as needed for 3lb wt gain in 24hrs, increased shortness of breath, Disp: 60 tablet, Rfl: 5  •  clopidogrel (PLAVIX) 75 MG tablet, , Disp: , Rfl:   •  coenzyme Q10 100 MG capsule, Take 100 mg by mouth Daily., Disp: , Rfl:   •  cyclobenzaprine (FLEXERIL) 10 MG tablet, Take 1 tablet by mouth 3 (Three) Times a Day As Needed for Muscle Spasms., Disp:  15 tablet, Rfl: 0  •  CYCLOSET 0.8 MG tablet, Take 3.2 mg by mouth Every Morning., Disp: , Rfl: 0  •  DUPIXENT 300 MG/2ML solution prefilled syringe, , Disp: , Rfl:   •  flunisolide (NASALIDE) 25 MCG/ACT (0.025%) solution nasal spray, 2 sprays 2 (Two) Times a Day., Disp: , Rfl: 0  •  gabapentin (NEURONTIN) 600 MG tablet, Take 600 mg by mouth 2 (Two) Times a Day., Disp: , Rfl: 0  •  HUMALOG KWIKPEN 100 UNIT/ML solution pen-injector, Inject 10 Units under the skin 3 (Three) Times a Day With Meals. Follows SSI From PCP (Patient taking differently: Inject 50-60 Units under the skin into the appropriate area as directed 3 (Three) Times a Day With Meals. Follows SSI From PCP), Disp: , Rfl: 0  •  HYDROcodone-acetaminophen (NORCO) 5-325 MG per tablet, Take 1 tablet by mouth Every 6 (Six) Hours As Needed for Severe Pain ., Disp: 12 tablet, Rfl: 0  •  ibuprofen (ADVIL,MOTRIN) 800 MG tablet, , Disp: , Rfl:   •  ipratropium-albuterol (DUO-NEB) 0.5-2.5 mg/3 ml nebulizer, Take 3 mL by nebulization 4 (Four) Times a Day As Needed for Wheezing or Shortness of Air., Disp: 360 mL, Rfl: 5  •  levocetirizine (XYZAL) 5 MG tablet, Take 5 mg by mouth Every Evening., Disp: , Rfl: 0  •  lisinopril (PRINIVIL,ZESTRIL) 20 MG tablet, Take 20 mg by mouth Daily., Disp: , Rfl:   •  meclizine (ANTIVERT) 25 MG tablet, Take 1 tablet by mouth 3 (Three) Times a Day As Needed for dizziness., Disp: 10 tablet, Rfl: 0  •  metoclopramide (REGLAN) 5 MG tablet, , Disp: , Rfl:   •  metoprolol tartrate (LOPRESSOR) 50 MG tablet, take 1 tablet by mouth twice a day (Patient taking differently: Take 50 mg by mouth 2 (Two) Times a Day.), Disp: 180 tablet, Rfl: 3  •  omeprazole (priLOSEC) 20 MG capsule, Take 20 mg by mouth Daily., Disp: , Rfl: 0  •  ondansetron ODT (ZOFRAN-ODT) 4 MG disintegrating tablet, Take 1 tablet by mouth Every 6 (Six) Hours As Needed for Nausea., Disp: 20 tablet, Rfl: 0  •  potassium chloride (K-DUR,KLOR-CON) 20 MEQ CR tablet, Take 1 tablet by  mouth Daily., Disp: 8 tablet, Rfl: 0  •  promethazine (PHENERGAN) 25 MG tablet, Take 1 tablet by mouth Every 6 (Six) Hours As Needed for Nausea or Vomiting., Disp: 20 tablet, Rfl: 0  •  ranolazine (RANEXA) 500 MG 12 hr tablet, take 1 tablet by mouth twice a day (Patient taking differently: Take 500 mg by mouth 2 (Two) Times a Day.), Disp: 180 tablet, Rfl: 3  •  spironolactone (ALDACTONE) 25 MG tablet, Take 1 tablet by mouth Daily., Disp: 30 tablet, Rfl: 2  •  Tiotropium Bromide Monohydrate (SPIRIVA RESPIMAT) 1.25 MCG/ACT aerosol solution inhaler, Inhale 2 puffs Daily., Disp: 1 inhaler, Rfl: 5  •  TiZANidine (ZANAFLEX) 4 MG capsule, Take 1 capsule by mouth 3 (Three) Times a Day., Disp: 20 capsule, Rfl: 0  •  TOUJEO SOLOSTAR 300 UNIT/ML solution pen-injector, Inject 60 Units as directed Daily. (Patient taking differently: Inject 160 Units as directed Every Night.), Disp: , Rfl: 0  •  TURMERIC PO, Take 500 mg by mouth 2 (Two) Times a Day., Disp: , Rfl:   •  VENTOLIN  (90 Base) MCG/ACT inhaler, inhale 2 puffs by mouth and INTO THE LUNGS every 6 hours if needed (Patient taking differently: Inhale 2 puffs Every 4 (Four) Hours As Needed for Wheezing or Shortness of Air.), Disp: 54 g, Rfl: 0  •  VICTOZA 18 MG/3ML solution pen-injector injection, Inject 0.6 mg under the skin into the appropriate area as directed Daily., Disp: , Rfl: 0  •  vitamin C (ASCORBIC ACID) 500 MG tablet, Take 500 mg by mouth Daily., Disp: , Rfl:   •  Vortioxetine HBr (TRINTELLIX) 20 MG tablet, Take 20 mg by mouth Daily., Disp: , Rfl:   •  zafirlukast (ACCOLATE) 20 MG tablet, Take 1 tablet by mouth 2 (Two) Times a Day., Disp: 60 tablet, Rfl: 5    Allergies:  Allergies   Allergen Reactions   • Sulfa Antibiotics Anaphylaxis, Nausea And Vomiting and Delirium   • Invokana [Canagliflozin] Other (See Comments)     DKA   • Codeine Nausea And Vomiting     Can only take with Phenergan   • Morphine Other (See Comments)     Does not work. It causes pain        Social Hx:  Social History     Tobacco Use   • Smoking status: Former Smoker     Packs/day: 1.00     Years: 10.00     Pack years: 10.00     Types: Cigarettes     Last attempt to quit: 1998     Years since quittin.1   • Smokeless tobacco: Former User     Types: Snuff     Quit date:    Substance Use Topics   • Alcohol use: Yes     Frequency: Monthly or less     Drinks per session: 3 or 4     Comment: 3 PER YEAR   • Drug use: No       Family Hx:  Family History   Problem Relation Age of Onset   • Hypertension Mother    • Arthritis Mother    • Alcohol abuse Father    • Heart disease Other    • Stroke Other    • Hypertension Other    • Other Other         Neurologic disorder   • Parkinsonism Other    • Stroke Other    • Heart disease Other    • Hypertension Other    • Heart disease Other    • Stroke Other    • Hypertension Other        Review of Imaging:  Duplex scan of the upper extremity that was performed on 2020 was reviewed independently along with the corresponding report.  Study shows normal right upper extremity arterial duplex, most notably evidence of triphasic flow in the right radial artery and no evidence of a pseudoaneurysm or hematoma.    MRI of Head that was performed on 2020 was reviewed with Dr. Dubois independently along with his corresponding report.  Studies show no evidence of an acute infarction or any other acute etiologies.    Physical Examination:    Vitals:    20 1510   BP: 124/70   Temp: 97.6 °F (36.4 °C)   BMI: 43.2    General Appearance:   Well developed, well nourished, well groomed, alert, and cooperative.  Cardiovascular: Regular rate and rhythm. No carotid bruits  Skin:     Right radial wrist incision was dry and intact without evidence of purulence, edema, or erythema.  Musculoskeletal:   Palpable tenderness at right lower arm       Full range of motion without pain at elbow and wrist  Neurological examination:  Neurologic Exam     Mental Status    Oriented to person, place, and time.     Cranial Nerves     CN II   Visual fields full to confrontation.     CN III, IV, VI   Extraocular motions are normal.     CN V   Right facial sensation deficit: none  Left facial sensation deficit: complete    CN VII   Facial expression full, symmetric.     CN VIII   Hearing: intact    CN IX, X   Palate: symmetric    CN XI   Right trapezius strength: normal  Left trapezius strength: normal    CN XII   Tongue: not atrophic  Fasciculations: absent  Tongue deviation: none    Motor Exam   Muscle bulk: normal  Overall muscle tone: normal  Right arm pronator drift: absent  Left arm pronator drift: present    Strength   Right biceps: 5/5  Left biceps: 4/5  Right triceps: 5/5  Left triceps: 4/5  Right iliopsoas: 5/5  Left iliopsoas: 3/5  Right quadriceps: 5/5  Left quadriceps: 3/5  Right hamstrin/5  Left hamstring: 3/5    Sensory Exam   Right arm light touch: normal  Left arm light touch: decreased from elbow  Right leg light touch: normal  Left leg light touch: decreased from knee    Gait, Coordination, and Reflexes     Gait  Gait: (Antalgic, uses cane)      Diagnoses/Plan:    Mr. Harding is a 59 y.o. male who is seen in follow-up from a right radial acess angiogram due to symptoms of reproducible dizziness and near syncope episodes when turning head either direction was performed in 2019 due to concern of possible pseudoaneurysm.  On 2020, patient had a duplex ultrasound completed that showed no evidence of a pseudoaneurysm or hematoma and appropriate flow within the right radial artery.  In addition, patient most pain is proximal to the incision site, therefore it was recommended for patient to follow-up with his primary care provider.      In regards to patient's recent left-sided weakness and numbness, patient was encouraged to keep follow-up appointment with neurology due to the symptoms.      He was instructed that he can call our office with any questions  or concerns.  Patient was understanding this plan and willing to proceed.    Leeanna Sloan PA-C

## 2020-02-27 ENCOUNTER — TELEPHONE (OUTPATIENT)
Dept: INTERNAL MEDICINE | Facility: CLINIC | Age: 60
End: 2020-02-27

## 2020-02-27 NOTE — TELEPHONE ENCOUNTER
PATIENT ADMITTED TO [facility] FOR HEART CATH. DATE OF ADMISSION 02-24-20 DC 02-27-20. PATIENT IS SCHEDULED WITH DR. CLARKE ON 03/05 @ 1:30 PM.

## 2020-02-28 ENCOUNTER — TRANSITIONAL CARE MANAGEMENT TELEPHONE ENCOUNTER (OUTPATIENT)
Dept: INTERNAL MEDICINE | Facility: CLINIC | Age: 60
End: 2020-02-28

## 2020-03-02 ENCOUNTER — OFFICE VISIT (OUTPATIENT)
Dept: INTERNAL MEDICINE | Facility: CLINIC | Age: 60
End: 2020-03-02

## 2020-03-02 VITALS
WEIGHT: 287 LBS | OXYGEN SATURATION: 96 % | TEMPERATURE: 97.6 F | DIASTOLIC BLOOD PRESSURE: 88 MMHG | BODY MASS INDEX: 41.09 KG/M2 | HEART RATE: 85 BPM | HEIGHT: 70 IN | RESPIRATION RATE: 16 BRPM | SYSTOLIC BLOOD PRESSURE: 121 MMHG

## 2020-03-02 DIAGNOSIS — Z12.11 COLON CANCER SCREENING: ICD-10-CM

## 2020-03-02 DIAGNOSIS — E78.2 MIXED HYPERLIPIDEMIA: ICD-10-CM

## 2020-03-02 DIAGNOSIS — I10 BENIGN ESSENTIAL HYPERTENSION: Primary | ICD-10-CM

## 2020-03-02 DIAGNOSIS — K21.00 GASTROESOPHAGEAL REFLUX DISEASE WITH ESOPHAGITIS: ICD-10-CM

## 2020-03-02 DIAGNOSIS — E11.65 UNCONTROLLED TYPE 2 DIABETES MELLITUS WITH HYPERGLYCEMIA (HCC): ICD-10-CM

## 2020-03-02 PROCEDURE — 99204 OFFICE O/P NEW MOD 45 MIN: CPT | Performed by: INTERNAL MEDICINE

## 2020-03-02 RX ORDER — ZAFIRLUKAST 20 MG/1
20 TABLET, FILM COATED ORAL 2 TIMES DAILY
Qty: 60 TABLET | Refills: 5 | Status: SHIPPED | OUTPATIENT
Start: 2020-03-02 | End: 2020-04-21 | Stop reason: SDUPTHER

## 2020-03-02 RX ORDER — BUDESONIDE AND FORMOTEROL FUMARATE DIHYDRATE 80; 4.5 UG/1; UG/1
2 AEROSOL RESPIRATORY (INHALATION)
Qty: 1 INHALER | Refills: 5 | Status: SHIPPED | OUTPATIENT
Start: 2020-03-02 | End: 2020-04-21 | Stop reason: SDUPTHER

## 2020-03-02 RX ORDER — ALBUTEROL SULFATE 90 UG/1
2 AEROSOL, METERED RESPIRATORY (INHALATION) EVERY 4 HOURS PRN
Qty: 1 INHALER | Refills: 4 | Status: SHIPPED | OUTPATIENT
Start: 2020-03-02 | End: 2020-04-21 | Stop reason: SDUPTHER

## 2020-03-02 RX ORDER — AZELASTINE 1 MG/ML
1 SPRAY, METERED NASAL 2 TIMES DAILY PRN
Qty: 1 EACH | Refills: 5 | Status: SHIPPED | OUTPATIENT
Start: 2020-03-02 | End: 2020-04-21 | Stop reason: SDUPTHER

## 2020-03-02 RX ORDER — IPRATROPIUM BROMIDE AND ALBUTEROL SULFATE 2.5; .5 MG/3ML; MG/3ML
3 SOLUTION RESPIRATORY (INHALATION) 4 TIMES DAILY PRN
Qty: 360 ML | Refills: 5 | Status: SHIPPED | OUTPATIENT
Start: 2020-03-02 | End: 2020-10-05 | Stop reason: SDUPTHER

## 2020-03-02 RX ORDER — FLUNISOLIDE 0.25 MG/ML
2 SOLUTION NASAL EVERY 12 HOURS
Qty: 25 ML | Refills: 2 | Status: SHIPPED | OUTPATIENT
Start: 2020-03-02 | End: 2020-08-31

## 2020-03-02 RX ORDER — ASPIRIN 81 MG/1
81 TABLET ORAL DAILY
COMMUNITY
End: 2021-03-02 | Stop reason: HOSPADM

## 2020-03-02 RX ORDER — MOMETASONE FUROATE 100 UG/1
1 AEROSOL RESPIRATORY (INHALATION) 2 TIMES DAILY
Qty: 1 INHALER | Refills: 5 | Status: SHIPPED | OUTPATIENT
Start: 2020-03-02 | End: 2020-04-21 | Stop reason: SDUPTHER

## 2020-03-02 NOTE — OUTREACH NOTE
Pt discharged from [facility] 2/27/20 and has an appointment with Dr Isaura Godoy 3/2/20, which is within 2 business days of discharge. No additional telephone outreach required. TCM is applicable.

## 2020-03-02 NOTE — PROGRESS NOTES
Subjective   Denzel Harding is a 59 y.o. male.     Chief Complaint   Patient presents with   • Establish Care   • Hypertension   • Hyperlipidemia   • Diabetes   • GI Problem       History of Present Illness   HPI: Patient is here to establish care, he has multiple medical problems, patient is also here to follow up on the blood pressure  The patient is taking the blood pressure medications as prescribed and has had no side effects. The patient is also here to follow up on the cholesterol and is trying to follow a diet. The patient is also here to follow on sugar and  due to get lab work done .  He sees endocrinology Dr. Grissom for his sugar and is on several medications from endocrinology for it, the patient also needs refills on medications .  Patient complains of reflux symptoms and is due for a colonoscopy.  His medical records have been reviewed with him today and his wife who accompanies him, history is from patient and his wife  Hypertension   Pertinent negatives include no chest pain, palpitations or shortness of breath.   Hyperlipidemia   Pertinent negatives include no chest pain or shortness of breath.   Diabetes   Pertinent negatives for hypoglycemia include no dizziness, nervousness/anxiousness or pallor. Pertinent negatives for diabetes include no chest pain, no shortness of breath  Patient is on acei/arb     Review of Systems   Constitutional: Negative for appetite change, fatigue and fever.   HENT: Negative for congestion, ear discharge, ear pain, sinus pressure and sore throat.    Eyes: Negative for pain and discharge.   Respiratory: Negative for cough, shortness of breath and wheezing.    Cardiovascular: Negative for chest pain, palpitations and leg swelling.   Gastrointestinal: Negative for abdominal pain, constipation, diarrhea, nausea and vomiting.        Gerd   Endocrine: Negative for cold intolerance and heat intolerance.   Genitourinary: Negative for dysuria and flank pain.   Musculoskeletal:  Negative for arthralgias and joint swelling.   Skin: Negative for pallor and rash.   Allergic/Immunologic: Negative for environmental allergies and food allergies.   Neurological: Negative for dizziness, weakness and numbness.   Hematological: Negative for adenopathy. Does not bruise/bleed easily.   Psychiatric/Behavioral: Negative for behavioral problems and dysphoric mood. The patient is not nervous/anxious.        Past Medical History:   Diagnosis Date   • Anxiety    • Arthritis    • Asthma    • Brachial neuritis 1/19/2017   • Cellulitis     left arm and right leg    • Chest pain    • CHF (congestive heart failure) (CMS/[facility])    • COPD (chronic obstructive pulmonary disease) (CMS/[facility])    • Coronary artery disease    • Depression    • Diabetes mellitus (CMS/[facility])     diagnosed in 1998, checks fsbg 3-4x/day   • Dizziness    • Edema    • GERD (gastroesophageal reflux disease)    • H/O chest x-ray 07/04/2015    Mild Left base atelectasis   • H/O echocardiogram 07/05/2015    Normal LVSF. EF of 60-65%. Grade 1 diastolic dysfunction of the LV myocardium. No evidence of pericardial effusion   • H/O exercise stress test    • History of herniated intervertebral disc     History of left L5-S1 disc herniation   • Hypertension    • LOM (loss of memory) 1/19/2017   • Lower back pain     Right   • Measles    • Neuropathy     feet    • EDD treated with BiPAP     setting 13 and 5   • Palpitations    • Pericardial effusion    • Renal disorder    • Seizure disorder (CMS/[facility])    • Seizures (CMS/[facility])     FOCAL last 2009   • Shortness of breath 1/19/2017   • SOB (shortness of breath)    • Staph infection     back after sugery at the incision site    • Stroke (CMS/[facility])     x2 most recent , slight weakness in hands occasionaly    • Wears glasses        Past Surgical History:   Procedure Laterality Date   • BACK SURGERY     • CARDIAC CATHETERIZATION     • CARDIAC CATHETERIZATION N/A 8/11/2017    Procedure: Coronary angiography;   Surgeon: Dallas Hernandez MD;  Location: [facility] CATH INVASIVE LOCATION;  Service:    • CARDIAC CATHETERIZATION N/A 8/11/2017    Procedure: Left Heart Cath;  Surgeon: Dallas Hernandez MD;  Location: [facility] CATH INVASIVE LOCATION;  Service:    • CARDIAC CATHETERIZATION N/A 8/11/2017    Procedure: Left ventriculography;  Surgeon: Dallas Hernandez MD;  Location: [facility] CATH INVASIVE LOCATION;  Service:    • CARDIOVASCULAR STRESS TEST  07/03/2017    WITH DR HERNANDEZ AT [facility]   • CARPAL TUNNEL RELEASE Right    • CATARACT EXTRACTION W/ INTRAOCULAR LENS IMPLANT Right 6/12/2017    Procedure: CATARACT PHACO EXTRACTION WITH INTRAOCULAR LENS IMPLANT RIGHT ;  Surgeon: Natalie Cao MD;  Location: [facility] OR;  Service:    • CATARACT EXTRACTION W/ INTRAOCULAR LENS IMPLANT Left 7/10/2017    Procedure: CATARACT PHACO EXTRACTION WITH INTRAOCULAR LENS IMPLANT LEFT;  Surgeon: Natalie Cao MD;  Location: [facility] OR;  Service:    • CHOLECYSTECTOMY     • CHOLECYSTECTOMY     • COLONOSCOPY     • CORONARY ANGIOPLASTY WITH STENT PLACEMENT      X1-LAD   • CORONARY ARTERY BYPASS GRAFT N/A 8/15/2017    Procedure: CORONARY ARTERY BYPASS GRAFTING x 3 UTILIZING THE LEFT INTERNAL MAMMARY ARTERY WITH ENDOSCOPIC VEIN HARVESTING OF THE RIGHT GREATER SAPHENOUS VEIN, HAMLET, LAD ENDARECTOMY;  Surgeon: London Damon MD;  Location: [facility] OR;  Service:    • ENDOSCOPY     • EYE CAPSULOTOMY WITH LASER Right 11/25/2019    Procedure: EYE CAPSULOTOMY WITH LASER RIGHT;  Surgeon: Natalie Cao MD;  Location: [facility] OR;  Service: Ophthalmology   • EYE CAPSULOTOMY WITH LASER Left 12/9/2019    Procedure: EYE CAPSULOTOMY WITH LASER LEFT;  Surgeon: Natalie Cao MD;  Location: [facility];  Service: Ophthalmology   • EYE SURGERY      cataract surgery both eyes   • INTERVENTIONAL RADIOLOGY PROCEDURE Bilateral 12/31/2019    Procedure: Carotid Cerebral Angiogram;  Surgeon: Damian Dubois MD;  Location: [facility] CATH INVASIVE LOCATION;  Service: Interventional Radiology   •  KNEE ARTHROSCOPY Bilateral    • KNEE ARTHROSCOPY Right 2010    Dr Lewis   • KNEE ARTHROSCOPY Left 2008    Dr Jameson   • NEUROPLASTY / TRANSPOSITION ULNAR NERVE AT ELBOW     • OTHER SURGICAL HISTORY      Foraminotomy and discectomy   • PERICARDIAL WINDOW N/A 8/25/2017    Procedure: PERICARDIAL WINDOW;  Surgeon: Freeman Phillips MD;  Location: [facility];  Service:        Family History   Problem Relation Age of Onset   • Hypertension Mother    • Arthritis Mother    • Alcohol abuse Father    • Heart disease Other    • Stroke Other    • Hypertension Other    • Other Other         Neurologic disorder   • Parkinsonism Other    • Stroke Other    • Heart disease Other    • Hypertension Other    • Heart disease Other    • Stroke Other    • Hypertension Other         reports that he quit smoking about 22 years ago. His smoking use included cigarettes. He has a 10.00 pack-year smoking history. He quit smokeless tobacco use about 3 years ago.  His smokeless tobacco use included snuff. He reports that he drinks alcohol. He reports that he does not use drugs.    Allergies   Allergen Reactions   • Sulfa Antibiotics Anaphylaxis, Nausea And Vomiting and Delirium   • Invokana [Canagliflozin] Other (See Comments)     DKA   • Codeine Nausea And Vomiting     Can only take with Phenergan   • Morphine Other (See Comments)     Does not work. It causes pain           Current Outpatient Medications:   •  acetaminophen (TYLENOL) 500 MG tablet, Take 1,000 mg by mouth Every 6 (Six) Hours As Needed for Mild Pain ., Disp: , Rfl:   •  albuterol sulfate HFA (VENTOLIN HFA) 108 (90 Base) MCG/ACT inhaler, Inhale 2 puffs Every 4 (Four) Hours As Needed for Wheezing or Shortness of Air., Disp: 1 inhaler, Rfl: 4  •  amitriptyline (ELAVIL) 25 MG tablet, take 3 tablets by mouth at bedtime (Patient taking differently: Take 75 mg by mouth Every Night.), Disp: 270 tablet, Rfl: 0  •  amLODIPine (NORVASC) 5 MG tablet, TAKE 1/2 TABLET BY MOUTH DAILY (Patient  taking differently: Take 5 mg by mouth Daily.), Disp: 45 tablet, Rfl: 3  •  ASMANEX  MCG/ACT aerosol, Inhale 1 puff 2 (Two) Times a Day., Disp: 1 inhaler, Rfl: 5  •  aspirin 81 MG EC tablet, Take 81 mg by mouth Daily., Disp: , Rfl:   •  atorvastatin (LIPITOR) 80 MG tablet, Take 1 tablet by mouth Every Night., Disp: 30 tablet, Rfl: 0  •  azelastine (ASTELIN) 0.1 % nasal spray, 1 spray into the nostril(s) as directed by provider 2 (Two) Times a Day As Needed for Rhinitis or Allergies. Use in each nostril as directed, Disp: 1 each, Rfl: 5  •  budesonide-formoterol (SYMBICORT) 80-4.5 MCG/ACT inhaler, Inhale 2 puffs 2 (Two) Times a Day. Rinse mouth with water after use., Disp: 1 inhaler, Rfl: 5  •  bumetanide (BUMEX) 2 MG tablet, Take 1 tablet by mouth Daily. Take extra 2mg as needed for 3lb wt gain in 24hrs, increased shortness of breath, Disp: 60 tablet, Rfl: 5  •  coenzyme Q10 100 MG capsule, Take 100 mg by mouth Daily., Disp: , Rfl:   •  CYCLOSET 0.8 MG tablet, Take 3.2 mg by mouth Every Morning., Disp: , Rfl: 0  •  DUPIXENT 300 MG/2ML solution prefilled syringe, , Disp: , Rfl:   •  flunisolide (NASALIDE) 25 MCG/ACT (0.025%) solution nasal spray, Inhale 2 sprays Every 12 (Twelve) Hours., Disp: 25 mL, Rfl: 2  •  gabapentin (NEURONTIN) 600 MG tablet, Take 600 mg by mouth 2 (Two) Times a Day., Disp: , Rfl: 0  •  HUMALOG KWIKPEN 100 UNIT/ML solution pen-injector, Inject 10 Units under the skin 3 (Three) Times a Day With Meals. Follows SSI From PCP (Patient taking differently: Inject 50-60 Units under the skin into the appropriate area as directed 3 (Three) Times a Day With Meals. Follows SSI From PCP), Disp: , Rfl: 0  •  HYDROcodone-acetaminophen (NORCO) 5-325 MG per tablet, Take 1 tablet by mouth Every 6 (Six) Hours As Needed for Severe Pain ., Disp: 12 tablet, Rfl: 0  •  ibuprofen (ADVIL,MOTRIN) 800 MG tablet, , Disp: , Rfl:   •  ipratropium-albuterol (DUO-NEB) 0.5-2.5 mg/3 ml nebulizer, Take 3 mL by  nebulization 4 (Four) Times a Day As Needed for Wheezing or Shortness of Air., Disp: 360 mL, Rfl: 5  •  levocetirizine (XYZAL) 5 MG tablet, Take 5 mg by mouth Every Evening., Disp: , Rfl: 0  •  lisinopril (PRINIVIL,ZESTRIL) 20 MG tablet, Take 20 mg by mouth Daily., Disp: , Rfl:   •  meclizine (ANTIVERT) 25 MG tablet, Take 1 tablet by mouth 3 (Three) Times a Day As Needed for dizziness., Disp: 10 tablet, Rfl: 0  •  metoclopramide (REGLAN) 5 MG tablet, , Disp: , Rfl:   •  metoprolol tartrate (LOPRESSOR) 50 MG tablet, take 1 tablet by mouth twice a day (Patient taking differently: Take 50 mg by mouth 2 (Two) Times a Day.), Disp: 180 tablet, Rfl: 3  •  omeprazole (priLOSEC) 20 MG capsule, Take 20 mg by mouth Daily., Disp: , Rfl: 0  •  ondansetron ODT (ZOFRAN-ODT) 4 MG disintegrating tablet, Take 1 tablet by mouth Every 6 (Six) Hours As Needed for Nausea., Disp: 20 tablet, Rfl: 0  •  potassium chloride (K-DUR,KLOR-CON) 20 MEQ CR tablet, Take 1 tablet by mouth Daily., Disp: 8 tablet, Rfl: 0  •  promethazine (PHENERGAN) 25 MG tablet, Take 1 tablet by mouth Every 6 (Six) Hours As Needed for Nausea or Vomiting., Disp: 20 tablet, Rfl: 0  •  ranolazine (RANEXA) 500 MG 12 hr tablet, take 1 tablet by mouth twice a day (Patient taking differently: Take 500 mg by mouth 2 (Two) Times a Day.), Disp: 180 tablet, Rfl: 3  •  spironolactone (ALDACTONE) 25 MG tablet, Take 1 tablet by mouth Daily., Disp: 30 tablet, Rfl: 2  •  Tiotropium Bromide Monohydrate (SPIRIVA RESPIMAT) 1.25 MCG/ACT aerosol solution inhaler, Inhale 2 puffs Daily., Disp: 1 inhaler, Rfl: 5  •  TiZANidine (ZANAFLEX) 4 MG capsule, Take 1 capsule by mouth 3 (Three) Times a Day., Disp: 20 capsule, Rfl: 0  •  TOUJEO SOLOSTAR 300 UNIT/ML solution pen-injector, Inject 60 Units as directed Daily. (Patient taking differently: Inject 160 Units as directed Every Night.), Disp: , Rfl: 0  •  TURMERIC PO, Take 500 mg by mouth 2 (Two) Times a Day., Disp: , Rfl:   •  VICTOZA 18 MG/3ML  "solution pen-injector injection, Inject 0.6 mg under the skin into the appropriate area as directed Daily., Disp: , Rfl: 0  •  vitamin C (ASCORBIC ACID) 500 MG tablet, Take 500 mg by mouth Daily., Disp: , Rfl:   •  Vortioxetine HBr (TRINTELLIX) 20 MG tablet, Take 20 mg by mouth Daily., Disp: , Rfl:   •  zafirlukast (ACCOLATE) 20 MG tablet, Take 1 tablet by mouth 2 (Two) Times a Day., Disp: 60 tablet, Rfl: 5  •  clopidogrel (PLAVIX) 75 MG tablet, , Disp: , Rfl:   •  cyclobenzaprine (FLEXERIL) 10 MG tablet, Take 1 tablet by mouth 3 (Three) Times a Day As Needed for Muscle Spasms., Disp: 15 tablet, Rfl: 0  No current facility-administered medications for this visit.       Objective   Blood pressure 121/88, pulse 85, temperature 97.6 °F (36.4 °C), resp. rate 16, height 177.8 cm (70\"), weight 130 kg (287 lb), SpO2 96 %.    Physical Exam   Constitutional: He is oriented to person, place, and time. He appears well-developed and well-nourished. No distress.   HENT:   Head: Normocephalic and atraumatic.   Right Ear: External ear normal.   Left Ear: External ear normal.   Nose: Nose normal.   Mouth/Throat: Oropharynx is clear and moist.   Eyes: Pupils are equal, round, and reactive to light. Conjunctivae and EOM are normal.   Neck: Normal range of motion. Neck supple. No thyromegaly present.   Cardiovascular: Normal rate, regular rhythm and normal heart sounds.   Pulmonary/Chest: Effort normal. No respiratory distress.   Abdominal: Soft. He exhibits no distension. There is no tenderness. There is no guarding.   Musculoskeletal: He exhibits deformity. He exhibits no edema.   Lymphadenopathy:     He has no cervical adenopathy.   Neurological: He is alert and oriented to person, place, and time.   No gross motor or sensory deficits   Skin: Skin is warm and dry. He is not diaphoretic. No erythema.   Psychiatric: He has a normal mood and affect.   Nursing note and vitals reviewed.      Patient's Body mass index is 41.18 kg/m². BMI " is above normal parameters. Recommendations include: educational material, exercise counseling and nutrition counseling.      Results for orders placed or performed during the hospital encounter of 02/19/20   Duplex Upper Extremity Art / Grafts - Right CAR   Result Value Ref Range    BSA 2.5 m^2    Prox SCLA PSV -75.5 cm/sec     CV ECHO KEELEY - BZI_BMI 42.0 kilograms/m^2    BH CV ECHO KEELEY - BSA([place]) 2.6 m^2    BH CV ECHO KEELEY - BZI_METRIC_WEIGHT 132.9 kg    BH CV ECHO KEELEY - BZI_METRIC_HEIGHT 177.8 cm    Rt. Subclavian Artery PSV 72 cm/s    Rt. Axillary Artery PSV 60 cm/s    Rt. Brachial Artery PSV 99 cm/s    Rt. Radial Artery PSV 58 cm/s    Rt. Ulnar Artery  cm/s         Assessment/Plan   Denzel was seen today for establish care, hypertension, hyperlipidemia, diabetes and gi problem.    Diagnoses and all orders for this visit:    Benign essential hypertension    Mixed hyperlipidemia  -     CBC & Differential  -     Comprehensive Metabolic Panel  -     Lipid Panel    Uncontrolled type 2 diabetes mellitus with hyperglycemia (CMS/HCC)    Colon cancer screening  -     Ambulatory Referral to Gastroenterology    Gastroesophageal reflux disease with esophagitis  -     Ambulatory Referral to Gastroenterology        Plan:  1.  Benign essential hypertension: Will continue current medication, low-sodium diet advised  2.mixed hyperlipidemia: will obtain   fasting CMP and lipid panel.  Diet and exercise counseled,  Will continue current medications  3. Diabetes  Mellitus  : will obtain   fasting CMP    , diet and exercise counseled , Will continue current medications- per endocrinology  4. Gerd:  On prilosec  5. Screening for colon cancer : refer to greg Godoy MD

## 2020-03-03 ENCOUNTER — OFFICE VISIT (OUTPATIENT)
Dept: ORTHOPEDIC SURGERY | Facility: CLINIC | Age: 60
End: 2020-03-03

## 2020-03-03 VITALS — WEIGHT: 287 LBS | RESPIRATION RATE: 18 BRPM | HEIGHT: 70 IN | BODY MASS INDEX: 41.09 KG/M2

## 2020-03-03 DIAGNOSIS — M17.11 PRIMARY LOCALIZED OSTEOARTHROSIS OF THE KNEE, RIGHT: ICD-10-CM

## 2020-03-03 DIAGNOSIS — M25.561 ARTHRALGIA OF BOTH KNEES: Primary | ICD-10-CM

## 2020-03-03 DIAGNOSIS — M25.562 ARTHRALGIA OF BOTH KNEES: Primary | ICD-10-CM

## 2020-03-03 DIAGNOSIS — M17.12 PRIMARY LOCALIZED OSTEOARTHRITIS OF LEFT KNEE: ICD-10-CM

## 2020-03-03 PROCEDURE — 20610 DRAIN/INJ JOINT/BURSA W/O US: CPT | Performed by: PHYSICIAN ASSISTANT

## 2020-03-03 NOTE — PROGRESS NOTES
Subjective   Denzel Harding is a 59 y.o. male here today for injection therapy.    Chief Complaint   Patient presents with   • Left Knee - Injections   • Right Knee - Injections     Supartz #4   patient presents for 4th supartz injection.     Past Medical History:   Diagnosis Date   • Anxiety    • Arthritis    • Asthma    • Brachial neuritis 1/19/2017   • Cellulitis     left arm and right leg    • Chest pain    • CHF (congestive heart failure) (CMS/HCC)    • COPD (chronic obstructive pulmonary disease) (CMS/HCC)    • Coronary artery disease    • Depression    • Diabetes mellitus (CMS/HCC)     diagnosed in 1998, checks fsbg 3-4x/day   • Dizziness    • Edema    • GERD (gastroesophageal reflux disease)    • H/O chest x-ray 07/04/2015    Mild Left base atelectasis   • H/O echocardiogram 07/05/2015    Normal LVSF. EF of 60-65%. Grade 1 diastolic dysfunction of the LV myocardium. No evidence of pericardial effusion   • H/O exercise stress test    • History of herniated intervertebral disc     History of left L5-S1 disc herniation   • Hypertension    • LOM (loss of memory) 1/19/2017   • Lower back pain     Right   • Measles    • Neuropathy     feet    • EDD treated with BiPAP     setting 13 and 5   • Palpitations    • Pericardial effusion    • Renal disorder    • Seizure disorder (CMS/HCC)    • Seizures (CMS/HCC)     FOCAL last 2009   • Shortness of breath 1/19/2017   • SOB (shortness of breath)    • Staph infection     back after sugery at the incision site    • Stroke (CMS/HCC)     x2 most recent , slight weakness in hands occasionaly    • Wears glasses         Past Surgical History:   Procedure Laterality Date   • BACK SURGERY     • CARDIAC CATHETERIZATION     • CARDIAC CATHETERIZATION N/A 8/11/2017    Procedure: Coronary angiography;  Surgeon: Dallas Kim MD;  Location: [facility] CATH INVASIVE LOCATION;  Service:    • CARDIAC CATHETERIZATION N/A 8/11/2017    Procedure: Left Heart Cath;  Surgeon: Dallas HOWE  MD Judy;  Location: [facility] CATH INVASIVE LOCATION;  Service:    • CARDIAC CATHETERIZATION N/A 8/11/2017    Procedure: Left ventriculography;  Surgeon: Dallas Hernandez MD;  Location: [facility] CATH INVASIVE LOCATION;  Service:    • CARDIOVASCULAR STRESS TEST  07/03/2017    WITH DR HERNANDEZ AT [facility]   • CARPAL TUNNEL RELEASE Right    • CATARACT EXTRACTION W/ INTRAOCULAR LENS IMPLANT Right 6/12/2017    Procedure: CATARACT PHACO EXTRACTION WITH INTRAOCULAR LENS IMPLANT RIGHT ;  Surgeon: Natalie Cao MD;  Location: [facility] OR;  Service:    • CATARACT EXTRACTION W/ INTRAOCULAR LENS IMPLANT Left 7/10/2017    Procedure: CATARACT PHACO EXTRACTION WITH INTRAOCULAR LENS IMPLANT LEFT;  Surgeon: Natalie Cao MD;  Location: [facility] OR;  Service:    • CHOLECYSTECTOMY     • CHOLECYSTECTOMY     • COLONOSCOPY     • CORONARY ANGIOPLASTY WITH STENT PLACEMENT      X1-LAD   • CORONARY ARTERY BYPASS GRAFT N/A 8/15/2017    Procedure: CORONARY ARTERY BYPASS GRAFTING x 3 UTILIZING THE LEFT INTERNAL MAMMARY ARTERY WITH ENDOSCOPIC VEIN HARVESTING OF THE RIGHT GREATER SAPHENOUS VEIN, HAMLET, LAD ENDARECTOMY;  Surgeon: London Damon MD;  Location: [facility] OR;  Service:    • ENDOSCOPY     • EYE CAPSULOTOMY WITH LASER Right 11/25/2019    Procedure: EYE CAPSULOTOMY WITH LASER RIGHT;  Surgeon: Natalie Cao MD;  Location: [facility] OR;  Service: Ophthalmology   • EYE CAPSULOTOMY WITH LASER Left 12/9/2019    Procedure: EYE CAPSULOTOMY WITH LASER LEFT;  Surgeon: Natalie Cao MD;  Location: [facility] OR;  Service: Ophthalmology   • EYE SURGERY      cataract surgery both eyes   • INTERVENTIONAL RADIOLOGY PROCEDURE Bilateral 12/31/2019    Procedure: Carotid Cerebral Angiogram;  Surgeon: Damian Dubois MD;  Location: [facility] CATH INVASIVE LOCATION;  Service: Interventional Radiology   • KNEE ARTHROSCOPY Bilateral    • KNEE ARTHROSCOPY Right 2010    Dr Lewis   • KNEE ARTHROSCOPY Left 2008    Dr Jameson   • NEUROPLASTY / TRANSPOSITION ULNAR NERVE AT ELBOW    "  • OTHER SURGICAL HISTORY      Foraminotomy and discectomy   • PERICARDIAL WINDOW N/A 8/25/2017    Procedure: PERICARDIAL WINDOW;  Surgeon: Freeman Phillips MD;  Location: [facility] OR;  Service:        Allergies   Allergen Reactions   • Sulfa Antibiotics Anaphylaxis, Nausea And Vomiting and Delirium   • Invokana [Canagliflozin] Other (See Comments)     DKA   • Codeine Nausea And Vomiting     Can only take with Phenergan   • Morphine Other (See Comments)     Does not work. It causes pain       Objective   Resp 18   Ht 177.8 cm (70\")   Wt 130 kg (287 lb)   BMI 41.18 kg/m²    Physical Exam    Skin exam stable with no erythema, ecchymosis or rash.  No new swelling.  No motor or sensory changes.  Distal pulse intact.    Large Joint Arthrocentesis: R knee  Date/Time: 3/3/2020 2:16 PM  Consent given by: patient  Site marked: site marked  Timeout: Immediately prior to procedure a time out was called to verify the correct patient, procedure, equipment, support staff and site/side marked as required   Supporting Documentation  Indications: pain   Procedure Details  Location: knee - R knee  Preparation: Patient was prepped and draped in the usual sterile fashion  Needle size: 22 G (21 G)  Approach: anterolateral  Medications administered: 25 mg sodium hyaluronate 25 MG/2.5ML  Patient tolerance: patient tolerated the procedure well with no immediate complications    Large Joint Arthrocentesis: L knee  Date/Time: 3/3/2020 2:17 PM  Consent given by: patient  Site marked: site marked  Timeout: Immediately prior to procedure a time out was called to verify the correct patient, procedure, equipment, support staff and site/side marked as required   Supporting Documentation  Indications: pain   Procedure Details  Location: knee - L knee  Preparation: Patient was prepped and draped in the usual sterile fashion  Needle size: 22 G (21 G)  Approach: anterolateral  Medications administered: 25 mg sodium hyaluronate 25 MG/2.5ML  Patient " tolerance: patient tolerated the procedure well with no immediate complications          Assessment/Plan      Diagnosis Plan   1. Arthralgia of both knees  Large Joint Arthrocentesis: R knee    Large Joint Arthrocentesis: L knee   2. Primary localized osteoarthrosis of the knee, right  Large Joint Arthrocentesis: R knee    Large Joint Arthrocentesis: L knee   3. Primary localized osteoarthritis of left knee  Large Joint Arthrocentesis: R knee    Large Joint Arthrocentesis: L knee       No complications of injection noted.    Discussion of orthopaedic goals and activities and patient and/or guardian expressed appreciation. Call or notify for any adverse effect from injection therapy. Ice, heat, and/or modalities as beneficial. Watch for signs and symptoms of infection.    Recommendations:  Work/Activity Status: May perform usual activities as tolerated. May return to routine exercise and physical work as tolerated. No strenuous activity.  Patient and/or guardian is encouraged to call or return for any issues or concerns.    Follow up in 6 months    Patient agreeable to call or return sooner for any concerns.

## 2020-03-10 ENCOUNTER — OFFICE VISIT (OUTPATIENT)
Dept: NEUROLOGY | Facility: CLINIC | Age: 60
End: 2020-03-10

## 2020-03-10 VITALS — BODY MASS INDEX: 41.09 KG/M2 | WEIGHT: 287 LBS | HEIGHT: 70 IN

## 2020-03-10 DIAGNOSIS — G43.111 INTRACTABLE MIGRAINE WITH AURA WITH STATUS MIGRAINOSUS: Primary | ICD-10-CM

## 2020-03-10 DIAGNOSIS — R42 DIZZINESS: ICD-10-CM

## 2020-03-10 PROCEDURE — 99214 OFFICE O/P EST MOD 30 MIN: CPT | Performed by: PHYSICIAN ASSISTANT

## 2020-03-10 RX ORDER — AMITRIPTYLINE HYDROCHLORIDE 25 MG/1
TABLET, FILM COATED ORAL
Qty: 270 TABLET | Refills: 0 | Status: SHIPPED | OUTPATIENT
Start: 2020-03-10 | End: 2020-04-21 | Stop reason: SDUPTHER

## 2020-03-10 NOTE — PROGRESS NOTES
Subjective     Chief Complaint: headaches      History of Present Illness   Denzel Harding is a 59 y.o. male who returns to clinic today for evaluation of headaches. He was previously followed by Dr. Shoemaker. He has noted symptoms for several years marked by a constant stabbing right frontal headache. He notes associated light and sound sensitivity. He also notes associated blurry vision and visual halos. He denies any modifying factors.      He has been hospitalized at [facility] several times over the last year. He was most recently hospitalized in 1/18 for a headache as well as left sided numbness and weakness. He also noted associated dizziness. An MRI of the brain at this time was unremarkable. A CTA of the head and neck was notable for stenotic plaque in the MCA vessels bilaterally, though was otherwise essentially unremarkable. An echo was unremarkable. He was treated with indomethacin, which was somewhat beneficial.      He was also hospitalized at [facility] in 12/17 for left sided weakness, slurred speech, and word finding difficulties. He noted associated left facial weakness. He was treated with IV tPA. He was also treated with ASA 325mg and Lipitor 80mg daily. However, his Lipitor was recently decreased to 40mg daily per neurosurgery.     He is currently taking amitriptyline 75mg nightly, TPM 100mg nightly and GBP 300mg BID. He was unable to tolerate higher doses of both of these medications. He was also unable to tolerate Depakote.    Today: Since his last visit in 10/19, he notes that his headaches continue to be improved. However, he continues to note intermittent dizziness as well as numbness or tingling (which is mostly bilaterally, though may be worse on the left). He notes associated hyperglycemia as well as hypertension. An MRI of the brain and CTA of the head and neck has been unremarkable.       I have reviewed and confirmed the past family, social and medical history as accurate on 3/10/2020.     Review  "of Systems   Constitutional: Negative.    Eyes: Negative.    Respiratory: Negative.    Cardiovascular: Negative.    Gastrointestinal: Negative.    Endocrine: Negative.    Genitourinary: Negative.    Musculoskeletal: Negative.    Skin: Negative.    Allergic/Immunologic: Negative.    Neurological: Positive for dizziness, numbness and headaches.   Hematological: Negative.    Psychiatric/Behavioral: Negative.        Objective     Ht 177.8 cm (70\")   Wt 130 kg (287 lb)   BMI 41.18 kg/m²     General appearance today is normal.       Physical Exam   Neurological: He has normal strength. He has a normal Finger-Nose-Finger Test. Gait normal.   Psychiatric: His speech is normal.        Neurologic Exam     Mental Status   Speech: speech is normal   Level of consciousness: alert  Normal comprehension.     Cranial Nerves   Cranial nerves II through XII intact.     Motor Exam   Muscle bulk: normal  Overall muscle tone: normal    Strength   Strength 5/5 throughout.     Sensory Exam   Light touch normal.     Gait, Coordination, and Reflexes     Gait  Gait: normal    Coordination   Finger to nose coordination: normal    Tremor   Resting tremor: absent            Assessment/Plan   Denzel was seen today for dizziness.    Diagnoses and all orders for this visit:    Intractable migraine with aura with status migrainosus    Dizziness          Discussion/Summary   Denzel Harding returns to clinic today with a history of migraines and intermittent dizziness. I am concerned that his dizziness could be related to his history of uncontrolled DM and HTN. This was again discussed in detail. After discussing potential treatment options, it was elected to continue on his current medications unchanged. I couseled him on the importance of tight glucose and blood pressure control. He is scheduled for an endocrinology consult this afternoon. He will then follow up in 3 months , or sooner if needed.   I spent 25 minutes face to face with the " patient and family with 20 minutes spent on discussing diagnosis, prognosis, diagnostic testing, evaluation, current status, treatment options and management as discussed above.       As part of this visit I reviewed prior lab results, reviewed radiology results, reviewed radiology images and obtained additional history from the family which is incorporated in the HPI.      Lynnette Gamble PA-C

## 2020-03-12 ENCOUNTER — TELEPHONE (OUTPATIENT)
Dept: SURGERY | Facility: CLINIC | Age: 60
End: 2020-03-12

## 2020-03-12 ENCOUNTER — OFFICE VISIT (OUTPATIENT)
Dept: ORTHOPEDIC SURGERY | Facility: CLINIC | Age: 60
End: 2020-03-12

## 2020-03-12 VITALS — HEIGHT: 70 IN | WEIGHT: 287 LBS | BODY MASS INDEX: 41.09 KG/M2 | RESPIRATION RATE: 18 BRPM

## 2020-03-12 DIAGNOSIS — M25.561 ARTHRALGIA OF BOTH KNEES: Primary | ICD-10-CM

## 2020-03-12 DIAGNOSIS — M17.11 PRIMARY LOCALIZED OSTEOARTHROSIS OF THE KNEE, RIGHT: ICD-10-CM

## 2020-03-12 DIAGNOSIS — M25.562 ARTHRALGIA OF BOTH KNEES: Primary | ICD-10-CM

## 2020-03-12 DIAGNOSIS — M17.12 PRIMARY LOCALIZED OSTEOARTHRITIS OF LEFT KNEE: ICD-10-CM

## 2020-03-12 PROCEDURE — 20610 DRAIN/INJ JOINT/BURSA W/O US: CPT | Performed by: PHYSICIAN ASSISTANT

## 2020-03-12 NOTE — TELEPHONE ENCOUNTER
Called patient to see if he wanted to reschedule his appointment with Dr Kim. No answer, left message for patient to call office.

## 2020-03-12 NOTE — PROGRESS NOTES
Subjective   Denzel Harding is a 59 y.o. male here today for injection therapy.    Chief Complaint   Patient presents with   • Left Knee - Injections     Supartz #5   • Right Knee - Injections     Supartz #5     Patient presents for repeat bilateral visco supplementations into bilateral knee.     Past Medical History:   Diagnosis Date   • Anxiety    • Arthritis    • Asthma    • Brachial neuritis 1/19/2017   • Cellulitis     left arm and right leg    • Chest pain    • CHF (congestive heart failure) (CMS/HCC)    • COPD (chronic obstructive pulmonary disease) (CMS/HCC)    • Coronary artery disease    • Depression    • Diabetes mellitus (CMS/HCC)     diagnosed in 1998, checks fsbg 3-4x/day   • Dizziness    • Edema    • GERD (gastroesophageal reflux disease)    • H/O chest x-ray 07/04/2015    Mild Left base atelectasis   • H/O echocardiogram 07/05/2015    Normal LVSF. EF of 60-65%. Grade 1 diastolic dysfunction of the LV myocardium. No evidence of pericardial effusion   • H/O exercise stress test    • History of herniated intervertebral disc     History of left L5-S1 disc herniation   • Hypertension    • LOM (loss of memory) 1/19/2017   • Lower back pain     Right   • Measles    • Neuropathy     feet    • EDD treated with BiPAP     setting 13 and 5   • Palpitations    • Pericardial effusion    • Renal disorder    • Seizure disorder (CMS/HCC)    • Seizures (CMS/HCC)     FOCAL last 2009   • Shortness of breath 1/19/2017   • SOB (shortness of breath)    • Staph infection     back after sugery at the incision site    • Stroke (CMS/HCC)     x2 most recent , slight weakness in hands occasionaly    • Wears glasses         Past Surgical History:   Procedure Laterality Date   • BACK SURGERY     • CARDIAC CATHETERIZATION     • CARDIAC CATHETERIZATION N/A 8/11/2017    Procedure: Coronary angiography;  Surgeon: Dallas Kim MD;  Location: [facility] CATH INVASIVE LOC[facility];  Service:    • CARDIAC CATHETERIZATION N/A  8/11/2017    Procedure: Left Heart Cath;  Surgeon: Dallas Hernandez MD;  Location: [facility] CATH INVASIVE LOCATION;  Service:    • CARDIAC CATHETERIZATION N/A 8/11/2017    Procedure: Left ventriculography;  Surgeon: Dallas Hernandez MD;  Location: [facility] CATH INVASIVE LOCATION;  Service:    • CARDIOVASCULAR STRESS TEST  07/03/2017    WITH DR HERNANDEZ AT [facility]   • CARPAL TUNNEL RELEASE Right    • CATARACT EXTRACTION W/ INTRAOCULAR LENS IMPLANT Right 6/12/2017    Procedure: CATARACT PHACO EXTRACTION WITH INTRAOCULAR LENS IMPLANT RIGHT ;  Surgeon: Natalie Cao MD;  Location: [facility] OR;  Service:    • CATARACT EXTRACTION W/ INTRAOCULAR LENS IMPLANT Left 7/10/2017    Procedure: CATARACT PHACO EXTRACTION WITH INTRAOCULAR LENS IMPLANT LEFT;  Surgeon: Natalie Cao MD;  Location: [facility] OR;  Service:    • CHOLECYSTECTOMY     • CHOLECYSTECTOMY     • COLONOSCOPY     • CORONARY ANGIOPLASTY WITH STENT PLACEMENT      X1-LAD   • CORONARY ARTERY BYPASS GRAFT N/A 8/15/2017    Procedure: CORONARY ARTERY BYPASS GRAFTING x 3 UTILIZING THE LEFT INTERNAL MAMMARY ARTERY WITH ENDOSCOPIC VEIN HARVESTING OF THE RIGHT GREATER SAPHENOUS VEIN, HAMLET, LAD ENDARECTOMY;  Surgeon: London Damon MD;  Location: [facility] OR;  Service:    • ENDOSCOPY     • EYE CAPSULOTOMY WITH LASER Right 11/25/2019    Procedure: EYE CAPSULOTOMY WITH LASER RIGHT;  Surgeon: Natalie Cao MD;  Location: [facility] OR;  Service: Ophthalmology   • EYE CAPSULOTOMY WITH LASER Left 12/9/2019    Procedure: EYE CAPSULOTOMY WITH LASER LEFT;  Surgeon: Natalie Cao MD;  Location: [facility] OR;  Service: Ophthalmology   • EYE SURGERY      cataract surgery both eyes   • INTERVENTIONAL RADIOLOGY PROCEDURE Bilateral 12/31/2019    Procedure: Carotid Cerebral Angiogram;  Surgeon: Damian Dubois MD;  Location: [facility] CATH INVASIVE LOCATION;  Service: Interventional Radiology   • KNEE ARTHROSCOPY Bilateral    • KNEE ARTHROSCOPY Right 2010    Dr Lewis   • KNEE ARTHROSCOPY Left 2008      "[place]   • NEUROPLASTY / TRANSPOSITION ULNAR NERVE AT ELBOW     • OTHER SURGICAL HISTORY      Foraminotomy and discectomy   • PERICARDIAL WINDOW N/A 8/25/2017    Procedure: PERICARDIAL WINDOW;  Surgeon: Freeman Phillips MD;  Location: [facility] OR;  Service:        Allergies   Allergen Reactions   • Sulfa Antibiotics Anaphylaxis, Nausea And Vomiting and Delirium   • Invokana [Canagliflozin] Other (See Comments)     DKA   • Codeine Nausea And Vomiting     Can only take with Phenergan   • Morphine Other (See Comments)     Does not work. It causes pain       Objective   Resp 18   Ht 177.8 cm (70\")   Wt 130 kg (287 lb)   BMI 41.18 kg/m²    Physical Exam    Skin exam stable with no erythema, ecchymosis or rash.  No new swelling.  No motor or sensory changes.  Distal pulse intact.    Large Joint Arthrocentesis: R knee  Date/Time: 3/12/2020 2:00 PM  Consent given by: patient  Site marked: site marked  Timeout: Immediately prior to procedure a time out was called to verify the correct patient, procedure, equipment, support staff and site/side marked as required   Supporting Documentation  Indications: pain   Procedure Details  Location: knee - R knee  Preparation: Patient was prepped and draped in the usual sterile fashion  Needle size: 22 G (21 G)  Approach: anterolateral  Medications administered: 25 mg sodium hyaluronate 25 MG/2.5ML  Patient tolerance: patient tolerated the procedure well with no immediate complications    Large Joint Arthrocentesis: L knee  Date/Time: 3/12/2020 2:00 PM  Consent given by: patient  Site marked: site marked  Timeout: Immediately prior to procedure a time out was called to verify the correct patient, procedure, equipment, support staff and site/side marked as required   Supporting Documentation  Indications: pain   Procedure Details  Location: knee - L knee  Preparation: Patient was prepped and draped in the usual sterile fashion  Needle size: 22 G (21 G)  Approach: anterolateral  Medications " administered: 25 mg sodium hyaluronate 25 MG/2.5ML  Patient tolerance: patient tolerated the procedure well with no immediate complications          Assessment/Plan      Diagnosis Plan   1. Arthralgia of both knees  Large Joint Arthrocentesis: R knee    Large Joint Arthrocentesis: L knee   2. Primary localized osteoarthrosis of the knee, right  Large Joint Arthrocentesis: R knee    Large Joint Arthrocentesis: L knee   3. Primary localized osteoarthritis of left knee  Large Joint Arthrocentesis: R knee    Large Joint Arthrocentesis: L knee       No complications of injection noted.    Discussion of orthopaedic goals and activities and patient and/or guardian expressed appreciation. Call or notify for any adverse effect from injection therapy. Ice, heat, and/or modalities as beneficial. Watch for signs and symptoms of infection.    Recommendations:  Work/Activity Status: May perform usual activities as tolerated. May return to routine exercise and physical work as tolerated. No strenuous activity.  Patient and/or guardian is encouraged to call or return for any issues or concerns.    Follow up in 3 months    Patient agreeable to call or return sooner for any concerns.

## 2020-04-21 ENCOUNTER — PATIENT MESSAGE (OUTPATIENT)
Dept: INTERNAL MEDICINE | Facility: CLINIC | Age: 60
End: 2020-04-21

## 2020-04-21 RX ORDER — ALBUTEROL SULFATE 90 UG/1
2 AEROSOL, METERED RESPIRATORY (INHALATION) EVERY 4 HOURS PRN
Qty: 1 INHALER | Refills: 4 | Status: SHIPPED | OUTPATIENT
Start: 2020-04-21 | End: 2021-07-26 | Stop reason: SDUPTHER

## 2020-04-21 RX ORDER — BUDESONIDE AND FORMOTEROL FUMARATE DIHYDRATE 80; 4.5 UG/1; UG/1
2 AEROSOL RESPIRATORY (INHALATION)
Qty: 1 INHALER | Refills: 5 | Status: SHIPPED | OUTPATIENT
Start: 2020-04-21 | End: 2020-10-05 | Stop reason: SDUPTHER

## 2020-04-21 RX ORDER — AZELASTINE 1 MG/ML
1 SPRAY, METERED NASAL 2 TIMES DAILY PRN
Qty: 1 EACH | Refills: 5 | Status: SHIPPED | OUTPATIENT
Start: 2020-04-21 | End: 2020-08-31

## 2020-04-21 RX ORDER — AMITRIPTYLINE HYDROCHLORIDE 25 MG/1
75 TABLET, FILM COATED ORAL
Qty: 270 TABLET | Refills: 0 | Status: SHIPPED | OUTPATIENT
Start: 2020-04-21 | End: 2020-11-06

## 2020-04-21 RX ORDER — MOMETASONE FUROATE 100 UG/1
1 AEROSOL RESPIRATORY (INHALATION) 2 TIMES DAILY
Qty: 1 INHALER | Refills: 5 | Status: SHIPPED | OUTPATIENT
Start: 2020-04-21 | End: 2020-10-05 | Stop reason: SDUPTHER

## 2020-04-21 RX ORDER — ZAFIRLUKAST 20 MG/1
20 TABLET, FILM COATED ORAL 2 TIMES DAILY
Qty: 60 TABLET | Refills: 5 | Status: SHIPPED | OUTPATIENT
Start: 2020-04-21 | End: 2020-10-05 | Stop reason: SDUPTHER

## 2020-04-24 NOTE — TELEPHONE ENCOUNTER
From: Denzel Harding  To: Isaura Godoy MD  Sent: 4/21/2020 11:58 AM EDT  Subject: Non-Urgent Medical Question    I need to get the following medication refills    Trintellix 20 mg 1 tablet daily  Atorvastatin 80 mg  Prilosec 20 mg  Spirolactone 25 mg    Please make these 90 day prescriptions

## 2020-04-27 ENCOUNTER — TELEMEDICINE (OUTPATIENT)
Dept: INTERNAL MEDICINE | Facility: CLINIC | Age: 60
End: 2020-04-27

## 2020-04-27 DIAGNOSIS — I10 BENIGN ESSENTIAL HYPERTENSION: Primary | ICD-10-CM

## 2020-04-27 DIAGNOSIS — F33.1 MODERATE EPISODE OF RECURRENT MAJOR DEPRESSIVE DISORDER (HCC): ICD-10-CM

## 2020-04-27 DIAGNOSIS — K21.00 GASTROESOPHAGEAL REFLUX DISEASE WITH ESOPHAGITIS: ICD-10-CM

## 2020-04-27 DIAGNOSIS — I25.10 CORONARY ARTERY DISEASE INVOLVING NATIVE CORONARY ARTERY OF NATIVE HEART WITHOUT ANGINA PECTORIS: ICD-10-CM

## 2020-04-27 DIAGNOSIS — E78.2 MIXED HYPERLIPIDEMIA: ICD-10-CM

## 2020-04-27 DIAGNOSIS — E11.65 UNCONTROLLED TYPE 2 DIABETES MELLITUS WITH HYPERGLYCEMIA (HCC): ICD-10-CM

## 2020-04-27 DIAGNOSIS — F41.9 ANXIETY: ICD-10-CM

## 2020-04-27 PROCEDURE — 99214 OFFICE O/P EST MOD 30 MIN: CPT | Performed by: INTERNAL MEDICINE

## 2020-04-27 RX ORDER — ATORVASTATIN CALCIUM 80 MG/1
80 TABLET, FILM COATED ORAL NIGHTLY
Qty: 30 TABLET | Refills: 5 | Status: SHIPPED | OUTPATIENT
Start: 2020-04-27 | End: 2020-12-03 | Stop reason: SDUPTHER

## 2020-04-27 RX ORDER — OMEPRAZOLE 20 MG/1
20 CAPSULE, DELAYED RELEASE ORAL DAILY
Qty: 30 CAPSULE | Refills: 5 | Status: SHIPPED | OUTPATIENT
Start: 2020-04-27 | End: 2020-12-03 | Stop reason: SDUPTHER

## 2020-04-27 RX ORDER — SPIRONOLACTONE 25 MG/1
25 TABLET ORAL DAILY
Qty: 30 TABLET | Refills: 5 | Status: SHIPPED | OUTPATIENT
Start: 2020-04-27 | End: 2020-08-19 | Stop reason: HOSPADM

## 2020-04-27 NOTE — PROGRESS NOTES
Subjective   Denzel Harding is a 60 y.o. male.     Chief Complaint   Patient presents with   • Hypertension   • Hyperlipidemia   • Diabetes   • Coronary Artery Disease   • Anxiety   • Depression   • Heartburn       History of Present Illness   You have chosen to receive care through a telehealth visit.  Do you consent to use a video/audio connection for your medical care today? YES      HPI: Patient is following up on the blood pressure  The patient is taking the blood pressure medications as prescribed and has had no side effects. The patient is following up on the cholesterol and is trying to follow a diet. The patient is also here to follow on sugar and  due to get lab work done . He sees dr steward endocrinology in June 2020 ,  The patient also needs refills on medications .  He was admitted to [facility]  March 2020 - was admitted for chest pain and had ptca - with stent and was on brillinta  , he sees his cardiologist dr de dios at [facility], he was also admitted at [facility] April 2020 and was checked for covid - negative and was changed back to asa and plavix  , Brillinta and ranexa were discontinued and he was started on  imdur 30 mg qd  and magnesium , he  Has seen cardiologist - dr estrada twice in [place] since his ptca, patient also complains of reflux symptoms, he also complains of anxiety and depression  Hypertension   Pertinent negatives include no chest pain, palpitations or shortness of breath.   Hyperlipidemia   Pertinent negatives include no chest pain or shortness of breath.   Diabetes   Pertinent negatives for hypoglycemia include no dizziness, nervousness/anxiousness or pallor. Pertinent negatives for diabetes include no chest pain, no shortness of breath  Patient is on acei/arb     During today's visit, I reviewed the documented allergies, medications, chief complaint, and pertinent vitals.  I have confirmed with the patient that there have been no changes since this information was  discussed with my clinical team member.    The following portions of the patient's history were reviewed and updated as appropriate: allergies, current medications, past family history, past medical history, past social history, past surgical history and problem list.    Review of Systems   Constitutional: Negative for appetite change, fatigue and fever.   HENT: Negative for congestion, ear discharge, ear pain, sinus pressure and sore throat.    Eyes: Negative for pain and discharge.   Respiratory: Negative for cough, shortness of breath and wheezing.    Cardiovascular: Negative for chest pain, palpitations and leg swelling.   Gastrointestinal: Negative for abdominal pain, constipation, diarrhea, nausea and vomiting.        Gerd   Endocrine: Negative for cold intolerance and heat intolerance.   Genitourinary: Negative for dysuria and flank pain.   Musculoskeletal: Negative for arthralgias and joint swelling.   Skin: Negative for pallor and rash.   Allergic/Immunologic: Negative for environmental allergies and food allergies.   Neurological: Negative for dizziness, weakness and numbness.   Hematological: Negative for adenopathy. Does not bruise/bleed easily.   Psychiatric/Behavioral: Positive for dysphoric mood. Negative for behavioral problems. The patient is nervous/anxious.          Current Outpatient Medications:   •  acetaminophen (TYLENOL) 500 MG tablet, Take 1,000 mg by mouth Every 6 (Six) Hours As Needed for Mild Pain ., Disp: , Rfl:   •  albuterol sulfate HFA (Ventolin HFA) 108 (90 Base) MCG/ACT inhaler, Inhale 2 puffs Every 4 (Four) Hours As Needed for Wheezing or Shortness of Air., Disp: 1 inhaler, Rfl: 4  •  amitriptyline (ELAVIL) 25 MG tablet, Take 3 tablets by mouth every night at bedtime., Disp: 270 tablet, Rfl: 0  •  amLODIPine (NORVASC) 5 MG tablet, TAKE 1/2 TABLET BY MOUTH DAILY (Patient taking differently: Take 5 mg by mouth Daily.), Disp: 45 tablet, Rfl: 3  •  ASMANEX  MCG/ACT aerosol,  Inhale 1 puff 2 (Two) Times a Day., Disp: 1 inhaler, Rfl: 5  •  aspirin 81 MG EC tablet, Take 81 mg by mouth Daily., Disp: , Rfl:   •  atorvastatin (LIPITOR) 80 MG tablet, Take 1 tablet by mouth Every Night., Disp: 30 tablet, Rfl: 5  •  azelastine (ASTELIN) 0.1 % nasal spray, 1 spray into the nostril(s) as directed by provider 2 (Two) Times a Day As Needed for Rhinitis or Allergies. Use in each nostril as directed, Disp: 1 each, Rfl: 5  •  budesonide-formoterol (Symbicort) 80-4.5 MCG/ACT inhaler, Inhale 2 puffs 2 (Two) Times a Day. Rinse mouth with water after use., Disp: 1 inhaler, Rfl: 5  •  bumetanide (BUMEX) 2 MG tablet, Take 1 tablet by mouth Daily. Take extra 2mg as needed for 3lb wt gain in 24hrs, increased shortness of breath, Disp: 60 tablet, Rfl: 5  •  clopidogrel (PLAVIX) 75 MG tablet, , Disp: , Rfl:   •  coenzyme Q10 100 MG capsule, Take 100 mg by mouth Daily., Disp: , Rfl:   •  cyclobenzaprine (FLEXERIL) 10 MG tablet, Take 1 tablet by mouth 3 (Three) Times a Day As Needed for Muscle Spasms., Disp: 15 tablet, Rfl: 0  •  CYCLOSET 0.8 MG tablet, Take 3.2 mg by mouth Every Morning., Disp: , Rfl: 0  •  DUPIXENT 300 MG/2ML solution prefilled syringe, , Disp: , Rfl:   •  flunisolide (NASALIDE) 25 MCG/ACT (0.025%) solution nasal spray, Inhale 2 sprays Every 12 (Twelve) Hours., Disp: 25 mL, Rfl: 2  •  gabapentin (NEURONTIN) 600 MG tablet, Take 600 mg by mouth 2 (Two) Times a Day., Disp: , Rfl: 0  •  HUMALOG KWIKPEN 100 UNIT/ML solution pen-injector, Inject 10 Units under the skin 3 (Three) Times a Day With Meals. Follows SSI From PCP (Patient taking differently: Inject 50-60 Units under the skin into the appropriate area as directed 3 (Three) Times a Day With Meals. Follows SSI From PCP), Disp: , Rfl: 0  •  ipratropium-albuterol (DUO-NEB) 0.5-2.5 mg/3 ml nebulizer, Take 3 mL by nebulization 4 (Four) Times a Day As Needed for Wheezing or Shortness of Air., Disp: 360 mL, Rfl: 5  •  levocetirizine (XYZAL) 5 MG  tablet, Take 5 mg by mouth Every Evening., Disp: , Rfl: 0  •  lisinopril (PRINIVIL,ZESTRIL) 20 MG tablet, Take 20 mg by mouth Daily., Disp: , Rfl:   •  meclizine (ANTIVERT) 25 MG tablet, Take 1 tablet by mouth 3 (Three) Times a Day As Needed for dizziness., Disp: 10 tablet, Rfl: 0  •  metoclopramide (REGLAN) 5 MG tablet, , Disp: , Rfl:   •  metoprolol tartrate (LOPRESSOR) 50 MG tablet, take 1 tablet by mouth twice a day (Patient taking differently: Take 50 mg by mouth 2 (Two) Times a Day.), Disp: 180 tablet, Rfl: 3  •  omeprazole (priLOSEC) 20 MG capsule, Take 1 capsule by mouth Daily., Disp: 30 capsule, Rfl: 5  •  ondansetron ODT (ZOFRAN-ODT) 4 MG disintegrating tablet, Take 1 tablet by mouth Every 6 (Six) Hours As Needed for Nausea., Disp: 20 tablet, Rfl: 0  •  potassium chloride (K-DUR,KLOR-CON) 20 MEQ CR tablet, Take 1 tablet by mouth Daily., Disp: 8 tablet, Rfl: 0  •  promethazine (PHENERGAN) 25 MG tablet, Take 1 tablet by mouth Every 6 (Six) Hours As Needed for Nausea or Vomiting., Disp: 20 tablet, Rfl: 0  •  spironolactone (ALDACTONE) 25 MG tablet, Take 1 tablet by mouth Daily., Disp: 30 tablet, Rfl: 5  •  Tiotropium Bromide Monohydrate (SPIRIVA RESPIMAT) 1.25 MCG/ACT aerosol solution inhaler, Inhale 2 puffs Daily., Disp: 1 inhaler, Rfl: 5  •  TiZANidine (ZANAFLEX) 4 MG capsule, Take 1 capsule by mouth 3 (Three) Times a Day., Disp: 20 capsule, Rfl: 0  •  TOUJEO SOLOSTAR 300 UNIT/ML solution pen-injector, Inject 60 Units as directed Daily. (Patient taking differently: Inject 160 Units as directed Every Night.), Disp: , Rfl: 0  •  TURMERIC PO, Take 500 mg by mouth 2 (Two) Times a Day., Disp: , Rfl:   •  VICTOZA 18 MG/3ML solution pen-injector injection, Inject 0.6 mg under the skin into the appropriate area as directed Daily., Disp: , Rfl: 0  •  vitamin C (ASCORBIC ACID) 500 MG tablet, Take 500 mg by mouth Daily., Disp: , Rfl:   •  Vortioxetine HBr (Trintellix) 20 MG tablet, Take 20 mg by mouth Daily., Disp: 30  tablet, Rfl: 5  •  zafirlukast (Accolate) 20 MG tablet, Take 1 tablet by mouth 2 (Two) Times a Day., Disp: 60 tablet, Rfl: 5    Objective   bp  132/73  Pulse 92    Physical Exam   All vitals recorded within this visit are reported by the patient.  General appearance: Normocephalic and nontraumatic  HEENT: Appears benign, hearing appears intact  Neck: Appears supple  Respiratory: Effort appears normal  Musculoskeletal: Moving all limbs  CNS: No gross motor or sensory deficits  Psychiatry: Alert and oriented x3  Memory appears intact  Mood and affect appears normal  Insight appears normal        Results for orders placed or performed during the hospital encounter of 02/19/20   Duplex Upper Extremity Art / Grafts - Right CAR   Result Value Ref Range    BSA 2.5 m^2    Prox SCLA PSV -75.5 cm/sec     CV ECHO KEELEY - BZI_BMI 42.0 kilograms/m^2     CV ECHO KEELEY - BSA(HAYCOCK) 2.6 m^2     CV ECHO KEELEY - BZI_METRIC_WEIGHT 132.9 kg     CV ECHO KEELEY - BZI_METRIC_HEIGHT 177.8 cm    Rt. Subclavian Artery PSV 72 cm/s    Rt. Axillary Artery PSV 60 cm/s    Rt. Brachial Artery PSV 99 cm/s    Rt. Radial Artery PSV 58 cm/s    Rt. Ulnar Artery  cm/s         Assessment/Plan   Denzel was seen today for hypertension, hyperlipidemia, diabetes, coronary artery disease, anxiety, depression and heartburn.    Diagnoses and all orders for this visit:    Benign essential hypertension    Mixed hyperlipidemia  -     CBC & Differential  -     Comprehensive Metabolic Panel  -     Lipid Panel    Uncontrolled type 2 diabetes mellitus with hyperglycemia (CMS/HCC)    Anxiety    Moderate episode of recurrent major depressive disorder (CMS/HCC)    Gastroesophageal reflux disease with esophagitis    Coronary artery disease involving native coronary artery of native heart without angina pectoris    Other orders  -     atorvastatin (LIPITOR) 80 MG tablet; Take 1 tablet by mouth Every Night.  -     omeprazole (priLOSEC) 20 MG capsule; Take 1  capsule by mouth Daily.  -     spironolactone (ALDACTONE) 25 MG tablet; Take 1 tablet by mouth Daily.  -     Vortioxetine HBr (Trintellix) 20 MG tablet; Take 20 mg by mouth Daily.        Plan:  1.  Benign essential hypertension: Will continue current medication, low-sodium diet advised, Counseled to regularly check BP at home with goal averaging <130/80.   2.mixed hyperlipidemia: will obtain   fasting CMP and lipid panel.  Diet and exercise counseled,  Will continue current medications-per endocrinology  3. Diabetes  Mellitus  : will obtain   fasting CMP  and hba1c  , diet and exercise counseled , Will continue current medications  4.  CAD  : Will continue current medications , advised to follow-up with cardiology  5.  GERD: Refilled omeprazole  6.  Anxiety disorder: Refill Trintellix  7.  Major depression: We will obtain labs and refill Trintellix    This was an audio and video enabled telemedicine encounter.           Isaura Godoy MD

## 2020-05-04 ENCOUNTER — HOSPITAL ENCOUNTER (EMERGENCY)
Facility: HOSPITAL | Age: 60
Discharge: HOME | End: 2020-05-04
Attending: EMERGENCY MEDICINE | Admitting: EMERGENCY MEDICINE

## 2020-05-04 VITALS
RESPIRATION RATE: 17 BRPM | DIASTOLIC BLOOD PRESSURE: 80 MMHG | OXYGEN SATURATION: 94 % | SYSTOLIC BLOOD PRESSURE: 149 MMHG | BODY MASS INDEX: 42.95 KG/M2 | TEMPERATURE: 98.9 F | HEIGHT: 70 IN | WEIGHT: 300 LBS | HEART RATE: 91 BPM

## 2020-05-04 DIAGNOSIS — R73.9 HYPERGLYCEMIA: Primary | ICD-10-CM

## 2020-05-04 LAB
ALBUMIN SERPL-MCNC: 3.8 G/DL (ref 3.5–5.2)
ALBUMIN/GLOB SERPL: 1.2 G/DL
ALP SERPL-CCNC: 119 U/L (ref 39–117)
ALT SERPL W P-5'-P-CCNC: 79 U/L (ref 1–41)
ANION GAP SERPL CALCULATED.3IONS-SCNC: 17.2 MMOL/L (ref 5–15)
AST SERPL-CCNC: 83 U/L (ref 1–40)
ATMOSPHERIC PRESS: 734 MMHG
BASE EXCESS BLDV CALC-SCNC: -0.5 MMOL/L (ref 0–2)
BASOPHILS # BLD AUTO: 0.07 10*3/MM3 (ref 0–0.2)
BASOPHILS NFR BLD AUTO: 1 % (ref 0–1.5)
BDY SITE: ABNORMAL
BILIRUB SERPL-MCNC: 1 MG/DL (ref 0.2–1.2)
BILIRUB UR QL STRIP: NEGATIVE
BUN BLD-MCNC: 8 MG/DL (ref 6–20)
BUN/CREAT SERPL: 9.3 (ref 7–25)
CALCIUM SPEC-SCNC: 9.6 MG/DL (ref 8.6–10.5)
CHLORIDE SERPL-SCNC: 94 MMOL/L (ref 98–107)
CLARITY UR: CLEAR
CO2 SERPL-SCNC: 21.8 MMOL/L (ref 22–29)
COLOR UR: YELLOW
CREAT BLD-MCNC: 0.86 MG/DL (ref 0.76–1.27)
DEPRECATED RDW RBC AUTO: 45 FL (ref 37–54)
EOSINOPHIL # BLD AUTO: 0.14 10*3/MM3 (ref 0–0.4)
EOSINOPHIL NFR BLD AUTO: 2.1 % (ref 0.3–6.2)
ERYTHROCYTE [DISTWIDTH] IN BLOOD BY AUTOMATED COUNT: 15.9 % (ref 12.3–15.4)
GFR SERPL CREATININE-BSD FRML MDRD: 91 ML/MIN/1.73
GLOBULIN UR ELPH-MCNC: 3.2 GM/DL
GLUCOSE BLD-MCNC: 545 MG/DL (ref 65–99)
GLUCOSE BLDC GLUCOMTR-MCNC: 351 MG/DL (ref 70–130)
GLUCOSE BLDC GLUCOMTR-MCNC: 518 MG/DL (ref 70–130)
GLUCOSE UR STRIP-MCNC: ABNORMAL MG/DL
HCO3 BLDV-SCNC: 24.4 MMOL/L (ref 22–28)
HCT VFR BLD AUTO: 45.9 % (ref 37.5–51)
HGB BLD-MCNC: 15 G/DL (ref 13–17.7)
HGB UR QL STRIP.AUTO: NEGATIVE
HOLD SPECIMEN: NORMAL
HOLD SPECIMEN: NORMAL
HOROWITZ INDEX BLD+IHG-RTO: 21 %
IMM GRANULOCYTES # BLD AUTO: 0.07 10*3/MM3 (ref 0–0.05)
IMM GRANULOCYTES NFR BLD AUTO: 1 % (ref 0–0.5)
KETONES UR QL STRIP: ABNORMAL
LEUKOCYTE ESTERASE UR QL STRIP.AUTO: NEGATIVE
LIPASE SERPL-CCNC: 14 U/L (ref 13–60)
LYMPHOCYTES # BLD AUTO: 1.28 10*3/MM3 (ref 0.7–3.1)
LYMPHOCYTES NFR BLD AUTO: 18.8 % (ref 19.6–45.3)
MCH RBC QN AUTO: 25.9 PG (ref 26.6–33)
MCHC RBC AUTO-ENTMCNC: 32.7 G/DL (ref 31.5–35.7)
MCV RBC AUTO: 79.3 FL (ref 79–97)
MODALITY: ABNORMAL
MONOCYTES # BLD AUTO: 0.44 10*3/MM3 (ref 0.1–0.9)
MONOCYTES NFR BLD AUTO: 6.5 % (ref 5–12)
NEUTROPHILS # BLD AUTO: 4.81 10*3/MM3 (ref 1.7–7)
NEUTROPHILS NFR BLD AUTO: 70.6 % (ref 42.7–76)
NITRITE UR QL STRIP: NEGATIVE
NRBC BLD AUTO-RTO: 0 /100 WBC (ref 0–0.2)
PCO2 BLDV: 40.1 MM HG (ref 40–50)
PH BLDV: 7.39 PH UNITS (ref 7.32–7.42)
PH UR STRIP.AUTO: 6 [PH] (ref 5–8)
PLATELET # BLD AUTO: 216 10*3/MM3 (ref 140–450)
PMV BLD AUTO: 10.1 FL (ref 6–12)
PO2 BLDV: 92.1 MM HG (ref 30–50)
POTASSIUM BLD-SCNC: 5.5 MMOL/L (ref 3.5–5.2)
PROT SERPL-MCNC: 7 G/DL (ref 6–8.5)
PROT UR QL STRIP: NEGATIVE
RBC # BLD AUTO: 5.79 10*6/MM3 (ref 4.14–5.8)
SAO2 % BLDCOV: 95.7 % (ref 45–75)
SODIUM BLD-SCNC: 133 MMOL/L (ref 136–145)
SP GR UR STRIP: >=1.03 (ref 1–1.03)
TROPONIN T SERPL-MCNC: 0.01 NG/ML (ref 0–0.03)
UROBILINOGEN UR QL STRIP: ABNORMAL
VENTILATOR MODE: ABNORMAL
WBC NRBC COR # BLD: 6.81 10*3/MM3 (ref 3.4–10.8)
WHOLE BLOOD HOLD SPECIMEN: NORMAL
WHOLE BLOOD HOLD SPECIMEN: NORMAL

## 2020-05-04 PROCEDURE — 82805 BLOOD GASES W/O2 SATURATION: CPT

## 2020-05-04 PROCEDURE — 80053 COMPREHEN METABOLIC PANEL: CPT | Performed by: EMERGENCY MEDICINE

## 2020-05-04 PROCEDURE — 99284 EMERGENCY DEPT VISIT MOD MDM: CPT

## 2020-05-04 PROCEDURE — 84484 ASSAY OF TROPONIN QUANT: CPT | Performed by: EMERGENCY MEDICINE

## 2020-05-04 PROCEDURE — 99283 EMERGENCY DEPT VISIT LOW MDM: CPT

## 2020-05-04 PROCEDURE — 93005 ELECTROCARDIOGRAM TRACING: CPT

## 2020-05-04 PROCEDURE — 81003 URINALYSIS AUTO W/O SCOPE: CPT | Performed by: EMERGENCY MEDICINE

## 2020-05-04 PROCEDURE — 83690 ASSAY OF LIPASE: CPT | Performed by: EMERGENCY MEDICINE

## 2020-05-04 PROCEDURE — 82962 GLUCOSE BLOOD TEST: CPT

## 2020-05-04 PROCEDURE — 85025 COMPLETE CBC W/AUTO DIFF WBC: CPT | Performed by: EMERGENCY MEDICINE

## 2020-05-04 RX ORDER — SODIUM CHLORIDE 0.9 % (FLUSH) 0.9 %
10 SYRINGE (ML) INJECTION AS NEEDED
Status: DISCONTINUED | OUTPATIENT
Start: 2020-05-04 | End: 2020-05-04 | Stop reason: HOSPADM

## 2020-05-04 RX ADMIN — SODIUM CHLORIDE 2000 ML: 9 INJECTION, SOLUTION INTRAVENOUS at 13:07

## 2020-05-04 NOTE — ED PROVIDER NOTES
"Subjective   60-year-old male presenting with hyperglycemia.  He states that for the last several days he has felt generally unwell.  Glucose has been running \"high\" at home.  Endocrinologist instructed him to take serial doses of his insulin to correct this, he states he is been taking the insulin as directed and the sugar has continued to increase.  His only complaint is some \"kidney pain\".  No dysuria or hematuria.  But he is concerned he has a urinary tract infection.  He denies any chest pain, new shortness of breath, nausea, vomiting, abdominal pain, fevers, chills, cough or other complaints.  He claims that he has had very little to eat over the last few days as well.          Review of Systems   Constitutional: Positive for fatigue.   HENT: Negative.    Eyes: Negative.    Respiratory: Negative.    Cardiovascular: Negative.    Gastrointestinal: Negative.    Genitourinary: Negative.    Musculoskeletal: Negative.    Skin: Negative.    Neurological: Negative.    Psychiatric/Behavioral: Negative.        Past Medical History:   Diagnosis Date   • Anxiety    • Arthritis    • Asthma    • Brachial neuritis 1/19/2017   • Cellulitis     left arm and right leg    • Chest pain    • CHF (congestive heart failure) (CMS/[facility])    • COPD (chronic obstructive pulmonary disease) (CMS/[facility])    • Coronary artery disease    • Depression    • Diabetes mellitus (CMS/[facility])     diagnosed in 1998, checks fsbg 3-4x/day   • Dizziness    • Edema    • GERD (gastroesophageal reflux disease)    • H/O chest x-ray 07/04/2015    Mild Left base atelectasis   • H/O echocardiogram 07/05/2015    Normal LVSF. EF of 60-65%. Grade 1 diastolic dysfunction of the LV myocardium. No evidence of pericardial effusion   • H/O exercise stress test    • History of herniated intervertebral disc     History of left L5-S1 disc herniation   • Hypertension    • LOM (loss of memory) 1/19/2017   • Lower back pain     Right   • Measles    • Neuropathy     feet    • EDD " treated with BiPAP     setting 13 and 5   • Palpitations    • Pericardial effusion    • Renal disorder    • Seizure disorder (CMS/HCC)    • Seizures (CMS/[facility])     FOCAL last 2009   • Shortness of breath 1/19/2017   • SOB (shortness of breath)    • Staph infection     back after sugery at the incision site    • Stroke (CMS/HCC)     x2 most recent , slight weakness in hands occasionaly    • Wears glasses        Allergies   Allergen Reactions   • Sulfa Antibiotics Anaphylaxis, Nausea And Vomiting and Delirium   • Invokana [Canagliflozin] Other (See Comments)     DKA   • Codeine Nausea And Vomiting     Can only take with Phenergan   • Morphine Other (See Comments)     Does not work. It causes pain       Past Surgical History:   Procedure Laterality Date   • BACK SURGERY     • CARDIAC CATHETERIZATION     • CARDIAC CATHETERIZATION N/A 8/11/2017    Procedure: Coronary angiography;  Surgeon: Dallas Kim MD;  Location: [facility] CATH INVASIVE LOCATION;  Service:    • CARDIAC CATHETERIZATION N/A 8/11/2017    Procedure: Left Heart Cath;  Surgeon: Dallas Kim MD;  Location: [facility] CATH INVASIVE LOCATION;  Service:    • CARDIAC CATHETERIZATION N/A 8/11/2017    Procedure: Left ventriculography;  Surgeon: Dallas Kim MD;  Location: [facility] CATH INVASIVE LOCATION;  Service:    • CARDIOVASCULAR STRESS TEST  07/03/2017    WITH DR KIM AT [facility]   • CARPAL TUNNEL RELEASE Right    • CATARACT EXTRACTION W/ INTRAOCULAR LENS IMPLANT Right 6/12/2017    Procedure: CATARACT PHACO EXTRACTION WITH INTRAOCULAR LENS IMPLANT RIGHT ;  Surgeon: Natalie Cao MD;  Location: [facility];  Service:    • CATARACT EXTRACTION W/ INTRAOCULAR LENS IMPLANT Left 7/10/2017    Procedure: CATARACT PHACO EXTRACTION WITH INTRAOCULAR LENS IMPLANT LEFT;  Surgeon: Natalie Cao MD;  Location: [facility];  Service:    • CHOLECYSTECTOMY     • CHOLECYSTECTOMY     • COLONOSCOPY     • CORONARY ANGIOPLASTY WITH STENT PLACEMENT      X1-LAD   • CORONARY  ARTERY BYPASS GRAFT N/A 8/15/2017    Procedure: CORONARY ARTERY BYPASS GRAFTING x 3 UTILIZING THE LEFT INTERNAL MAMMARY ARTERY WITH ENDOSCOPIC VEIN HARVESTING OF THE RIGHT GREATER SAPHENOUS VEIN, HAMLET, LAD ENDARECTOMY;  Surgeon: London Damon MD;  Location:  [facility];  Service:    • ENDOSCOPY     • EYE CAPSULOTOMY WITH LASER Right 11/25/2019    Procedure: EYE CAPSULOTOMY WITH LASER RIGHT;  Surgeon: Natalie Cao MD;  Location:  [facility];  Service: Ophthalmology   • EYE CAPSULOTOMY WITH LASER Left 12/9/2019    Procedure: EYE CAPSULOTOMY WITH LASER LEFT;  Surgeon: Natalie Cao MD;  Location:  [facility];  Service: Ophthalmology   • EYE SURGERY      cataract surgery both eyes   • INTERVENTIONAL RADIOLOGY PROCEDURE Bilateral 12/31/2019    Procedure: Carotid Cerebral Angiogram;  Surgeon: Damian Dubois MD;  Location: [facility] CATH INVASIVE LOCATION;  Service: Interventional Radiology   • KNEE ARTHROSCOPY Bilateral    • KNEE ARTHROSCOPY Right 2010    Dr Lewis   • KNEE ARTHROSCOPY Left 2008    Dr Jameson   • NEUROPLASTY / TRANSPOSITION ULNAR NERVE AT ELBOW     • OTHER SURGICAL HISTORY      Foraminotomy and discectomy   • PERICARDIAL WINDOW N/A 8/25/2017    Procedure: PERICARDIAL WINDOW;  Surgeon: Freeman Phillips MD;  Location:  [facility];  Service:        Family History   Problem Relation Age of Onset   • Hypertension Mother    • Arthritis Mother    • Alcohol abuse Father    • Heart disease Other    • Stroke Other    • Hypertension Other    • Other Other         Neurologic disorder   • Parkinsonism Other    • Stroke Other    • Heart disease Other    • Hypertension Other    • Heart disease Other    • Stroke Other    • Hypertension Other        Social History     Socioeconomic History   • Marital status:      Spouse name: Not on file   • Number of children: 1   • Years of education: Not on file   • Highest education level: Not on file   Occupational History   • Occupation: [employer]     Employer:  DISABLED     Comment: Disabled since -Back Problems   Tobacco Use   • Smoking status: Former Smoker     Packs/day: 1.00     Years: 10.00     Pack years: 10.00     Types: Cigarettes     Last attempt to quit: 1998     Years since quittin.3   • Smokeless tobacco: Former User     Types: Snuff     Quit date:    Substance and Sexual Activity   • Alcohol use: Yes     Frequency: Monthly or less     Drinks per session: 3 or 4     Comment: 3 PER YEAR   • Drug use: No   • Sexual activity: Defer   Social History Narrative    Caffeine use: 0-1 serving daily.    Patient lives at home with wife.            Objective   Physical Exam   Constitutional: He is oriented to person, place, and time. He appears well-developed and well-nourished. No distress.   HENT:   Head: Normocephalic and atraumatic.   Right Ear: External ear normal.   Left Ear: External ear normal.   Nose: Nose normal.   Mouth/Throat: Oropharynx is clear and moist.   Eyes: Pupils are equal, round, and reactive to light. Conjunctivae and EOM are normal.   Neck: Normal range of motion. Neck supple.   Cardiovascular: Normal rate, regular rhythm, normal heart sounds and intact distal pulses.   Pulmonary/Chest: Effort normal and breath sounds normal. No respiratory distress.   Abdominal: Soft. Bowel sounds are normal. He exhibits no distension. There is no tenderness. There is no rebound and no guarding.   Musculoskeletal: Normal range of motion. He exhibits no edema, tenderness or deformity.   Neurological: He is alert and oriented to person, place, and time.   Skin: Skin is warm and dry. No rash noted.   Psychiatric: He has a normal mood and affect. His behavior is normal.   Nursing note and vitals reviewed.      Procedures           ED Course                                           MDM  Number of Diagnoses or Management Options  Hyperglycemia:   Diagnosis management comments: 60-year-old male with hyperglycemia.  Well-developed, well-nourished obese  man in no distress with exam as above.  He has normal vital signs.  His exam is otherwise largely unremarkable.  Will obtain labs, EKG.  Will give IV fluids.  Disposition pending.    DDX: Hyperglycemia, DKA, dietary indiscretion, UTI    EKG interpreted by me: Sinus rhythm, normal rate, right axis, no acute ST/T changes, this is an abnormal EKG, this is similar to previous    Lab work notable for hyperglycemia, otherwise no acute or significant abnormalities.  Glucose has come down nicely after some IV fluids.  Encouraged him to follow-up closely with his endocrinologist.  Will discharge home.       Amount and/or Complexity of Data Reviewed  Clinical lab tests: reviewed  Decide to obtain previous medical records or to obtain history from someone other than the patient: yes        Final diagnoses:   Hyperglycemia            Adonay Pabon MD  05/04/20 [id number]

## 2020-05-05 ENCOUNTER — TELEMEDICINE (OUTPATIENT)
Dept: PULMONOLOGY | Facility: CLINIC | Age: 60
End: 2020-05-05

## 2020-05-05 DIAGNOSIS — J45.50 SEVERE PERSISTENT ASTHMA WITHOUT COMPLICATION: ICD-10-CM

## 2020-05-05 DIAGNOSIS — R06.02 SHORTNESS OF BREATH: Primary | ICD-10-CM

## 2020-05-05 DIAGNOSIS — J30.89 OTHER ALLERGIC RHINITIS: ICD-10-CM

## 2020-05-05 DIAGNOSIS — G47.33 OSA (OBSTRUCTIVE SLEEP APNEA): ICD-10-CM

## 2020-05-05 DIAGNOSIS — E66.01 MORBID OBESITY, UNSPECIFIED OBESITY TYPE (HCC): ICD-10-CM

## 2020-05-05 PROCEDURE — 99214 OFFICE O/P EST MOD 30 MIN: CPT | Performed by: INTERNAL MEDICINE

## 2020-05-05 NOTE — PROGRESS NOTES
"You have chosen to receive care through a telehealth visit.  Do you consent to use a video/audio connection for your medical care today? Yes     Subjective   Denzel Harding is a 60 y.o. male.     History of Present Illness   Patient was evaluated today for follow up of asthma, EDD, allergic rhinitis and shortness of breath. Patient does not report any recent exacerbations requiring emergency room visits or hospitalizations.    Patient is using the rescue inhalers minimally. he is compliant with pulmonary medicines, as prescribed.    He is not receiving Dupixent for the past 1 month, as his wife called the Dupixent company and his insurance stopped covering it.     Patient doesn't report any issues with the device. The patient describes no significant issues with his mask either. Patient says that the compliance with the use of the equipment is good.     Patient says that his symptoms of fatigue & daytime sleepiness have been helped greatly with the use of PAP, as prescribed.     Patient says that he has been using his nasal sprays on a regular basis and describes no significant ongoing issues other than occasional congestion.     He says that he is having significant issues with his fingerstick and his blood sugar has ranged in the mid 400s and mid 500s on a consistent basis over the past few days.  In fact he was in the emergency room yesterday as his glucometer could not read his sugar and his wife was worried that it may be \"too high\".      The following portions of the patient's history were reviewed and updated as appropriate: allergies, current medications, past family history, past medical history, past social history and past surgical history.    Review of Systems   Constitutional: Negative for activity change and appetite change.   HENT: Negative for congestion and postnasal drip.    Respiratory: Positive for shortness of breath (mild).        Objective     Physical Exam  This was a remote visit. Physical " exam not performed.       Assessment/Plan   Diagnoses and all orders for this visit:    Shortness of breath    Severe persistent asthma without complication    Other allergic rhinitis    EDD (obstructive sleep apnea)    Morbid obesity, unspecified obesity type (CMS/[facility])           Return in about 5 months (around 10/5/2020) for Recheck, For Matilde.    DISCUSSION (if any):  The patient was in the ER and I reviewed the records and it appears that the patient was evaluated in the ER and initial comprehensive metabolic panel suggested a blood glucose level of more than 500.  He also had a venous blood gas which did not show any significant acidosis.  The patient was discharged after his repeat sugar was around 350, obtained via fingerstick device.    It appears that his symptoms of asthma are under adequate control with the current regimen.    Patient's medications for underlying asthma were reviewed in great detail.    Any needed adjustments to his pulmonary medications, either for clinical or insurance coverage reasons, have been made and are reflected in the orders.    Compliance with medications stressed.     Side effects of prescribed medications discussed with the patient.    I have discussed asthma action plan with him.    The patient was asked to call this office if the symptoms worsen.    I had a long discussion with the patient and told him that I will contact his primary care provider and encourage the patient to follow-up with her.    Due to significant improvement of his underlying severe asthma while he was able to receive dupillumab, and the fact that his eosinophil count have consistently remain more than 240 over the past year, I will request his insurance to approve either benralizumab or mepolizumab.    Side effects were discussed in great detail.     Continue treatment with BiPAP at a pressure of 13/5, with a nasal pillows.    Patient seems to be compliant with PAP device, based on the available data  and his account of improved symptoms.     Weight loss advised.    The patient was once again reminded to continue using the PAP device regularly, every night for atleast 4 hours.      Dictated utilizing Dragon dictation.    This document was electronically signed by Linda Barajas MD on 05/05/20 at 12:45

## 2020-05-06 ENCOUNTER — TELEMEDICINE (OUTPATIENT)
Dept: INTERNAL MEDICINE | Facility: CLINIC | Age: 60
End: 2020-05-06

## 2020-05-06 DIAGNOSIS — E11.65 UNCONTROLLED TYPE 2 DIABETES MELLITUS WITH HYPERGLYCEMIA (HCC): ICD-10-CM

## 2020-05-06 DIAGNOSIS — I10 BENIGN ESSENTIAL HYPERTENSION: Primary | ICD-10-CM

## 2020-05-06 PROCEDURE — 99214 OFFICE O/P EST MOD 30 MIN: CPT | Performed by: INTERNAL MEDICINE

## 2020-05-06 NOTE — PROGRESS NOTES
Subjective   Denzel Harding is a 60 y.o. male.     Chief Complaint   Patient presents with   • Hypertension   • Diabetes       History of Present Illness   You have chosen to receive care through a telehealth visit.  Do you consent to use a video/audio connection for your medical care today? YES      HPI: Patient is following up on his blood pressure and he is taking the blood pressure medications as prescribed and has had no side effects. The patient is also following up on ER visit at [facility]  on May 4, 2020 for hyperglycemia,   Hospital er records ,  Lab reports  and Radiology reports have been reviewed  Today and medications reconciled and updated .  Patient states that sugar has been running high this past weekend and he contacted his endocrinologist Dr. Grissom on Sunday and was told to increase the Humalog to 30 units every 3 hours which she has been doing but his sugar stayed above 500 and then he decided to go to the ER, he states he is using Toujeo 100 units twice a day and Humalog 30 units every 3 hours per endocrinology, per patient, patient has not contacted endocrinology since his ER visit and have advised him to do so as his sugars get elevated    During today's visit, I reviewed the documented allergies, medications, chief complaint, and pertinent vitals.  I have confirmed with the patient that there have been no changes since this information was discussed with my clinical team member.    The following portions of the patient's history were reviewed and updated as appropriate: allergies, current medications, past family history, past medical history, past social history, past surgical history and problem list.    Review of Systems   Constitutional: Negative for appetite change, fatigue and fever.   HENT: Negative for congestion, ear discharge, ear pain, sinus pressure and sore throat.    Eyes: Negative for pain and discharge.   Respiratory: Negative for cough, shortness of breath and  wheezing.    Cardiovascular: Negative for chest pain, palpitations and leg swelling.   Gastrointestinal: Negative for abdominal pain, constipation, diarrhea, nausea and vomiting.   Endocrine: Negative for cold intolerance and heat intolerance.   Genitourinary: Negative for dysuria and flank pain.   Musculoskeletal: Negative for arthralgias and joint swelling.   Skin: Negative for pallor and rash.   Allergic/Immunologic: Negative for environmental allergies and food allergies.   Neurological: Negative for dizziness, weakness and numbness.   Hematological: Negative for adenopathy. Does not bruise/bleed easily.   Psychiatric/Behavioral: Negative for behavioral problems and dysphoric mood. The patient is not nervous/anxious.          Current Outpatient Medications:   •  acetaminophen (TYLENOL) 500 MG tablet, Take 1,000 mg by mouth Every 6 (Six) Hours As Needed for Mild Pain ., Disp: , Rfl:   •  albuterol sulfate HFA (Ventolin HFA) 108 (90 Base) MCG/ACT inhaler, Inhale 2 puffs Every 4 (Four) Hours As Needed for Wheezing or Shortness of Air., Disp: 1 inhaler, Rfl: 4  •  amitriptyline (ELAVIL) 25 MG tablet, Take 3 tablets by mouth every night at bedtime., Disp: 270 tablet, Rfl: 0  •  amLODIPine (NORVASC) 5 MG tablet, TAKE 1/2 TABLET BY MOUTH DAILY (Patient taking differently: Take 5 mg by mouth Daily.), Disp: 45 tablet, Rfl: 3  •  ASMANEX  MCG/ACT aerosol, Inhale 1 puff 2 (Two) Times a Day., Disp: 1 inhaler, Rfl: 5  •  aspirin 81 MG EC tablet, Take 81 mg by mouth Daily., Disp: , Rfl:   •  atorvastatin (LIPITOR) 80 MG tablet, Take 1 tablet by mouth Every Night., Disp: 30 tablet, Rfl: 5  •  azelastine (ASTELIN) 0.1 % nasal spray, 1 spray into the nostril(s) as directed by provider 2 (Two) Times a Day As Needed for Rhinitis or Allergies. Use in each nostril as directed, Disp: 1 each, Rfl: 5  •  budesonide-formoterol (Symbicort) 80-4.5 MCG/ACT inhaler, Inhale 2 puffs 2 (Two) Times a Day. Rinse mouth with water after  use., Disp: 1 inhaler, Rfl: 5  •  bumetanide (BUMEX) 2 MG tablet, Take 1 tablet by mouth Daily. Take extra 2mg as needed for 3lb wt gain in 24hrs, increased shortness of breath, Disp: 60 tablet, Rfl: 5  •  clopidogrel (PLAVIX) 75 MG tablet, , Disp: , Rfl:   •  coenzyme Q10 100 MG capsule, Take 100 mg by mouth Daily., Disp: , Rfl:   •  cyclobenzaprine (FLEXERIL) 10 MG tablet, Take 1 tablet by mouth 3 (Three) Times a Day As Needed for Muscle Spasms., Disp: 15 tablet, Rfl: 0  •  CYCLOSET 0.8 MG tablet, Take 3.2 mg by mouth Every Morning., Disp: , Rfl: 0  •  DUPIXENT 300 MG/2ML solution prefilled syringe, , Disp: , Rfl:   •  flunisolide (NASALIDE) 25 MCG/ACT (0.025%) solution nasal spray, Inhale 2 sprays Every 12 (Twelve) Hours., Disp: 25 mL, Rfl: 2  •  gabapentin (NEURONTIN) 600 MG tablet, Take 600 mg by mouth 2 (Two) Times a Day., Disp: , Rfl: 0  •  HUMALOG KWIKPEN 100 UNIT/ML solution pen-injector, Inject 10 Units under the skin 3 (Three) Times a Day With Meals. Follows SSI From PCP (Patient taking differently: Inject 50-60 Units under the skin into the appropriate area as directed 3 (Three) Times a Day With Meals. Follows SSI From PCP), Disp: , Rfl: 0  •  ipratropium-albuterol (DUO-NEB) 0.5-2.5 mg/3 ml nebulizer, Take 3 mL by nebulization 4 (Four) Times a Day As Needed for Wheezing or Shortness of Air., Disp: 360 mL, Rfl: 5  •  levocetirizine (XYZAL) 5 MG tablet, Take 5 mg by mouth Every Evening., Disp: , Rfl: 0  •  lisinopril (PRINIVIL,ZESTRIL) 20 MG tablet, Take 20 mg by mouth Daily., Disp: , Rfl:   •  meclizine (ANTIVERT) 25 MG tablet, Take 1 tablet by mouth 3 (Three) Times a Day As Needed for dizziness., Disp: 10 tablet, Rfl: 0  •  metoclopramide (REGLAN) 5 MG tablet, , Disp: , Rfl:   •  metoprolol tartrate (LOPRESSOR) 50 MG tablet, take 1 tablet by mouth twice a day (Patient taking differently: Take 50 mg by mouth 2 (Two) Times a Day.), Disp: 180 tablet, Rfl: 3  •  omeprazole (priLOSEC) 20 MG capsule, Take 1  capsule by mouth Daily., Disp: 30 capsule, Rfl: 5  •  ondansetron ODT (ZOFRAN-ODT) 4 MG disintegrating tablet, Take 1 tablet by mouth Every 6 (Six) Hours As Needed for Nausea., Disp: 20 tablet, Rfl: 0  •  potassium chloride (K-DUR,KLOR-CON) 20 MEQ CR tablet, Take 1 tablet by mouth Daily., Disp: 8 tablet, Rfl: 0  •  promethazine (PHENERGAN) 25 MG tablet, Take 1 tablet by mouth Every 6 (Six) Hours As Needed for Nausea or Vomiting., Disp: 20 tablet, Rfl: 0  •  spironolactone (ALDACTONE) 25 MG tablet, Take 1 tablet by mouth Daily., Disp: 30 tablet, Rfl: 5  •  Tiotropium Bromide Monohydrate (SPIRIVA RESPIMAT) 1.25 MCG/ACT aerosol solution inhaler, Inhale 2 puffs Daily., Disp: 1 inhaler, Rfl: 5  •  TiZANidine (ZANAFLEX) 4 MG capsule, Take 1 capsule by mouth 3 (Three) Times a Day., Disp: 20 capsule, Rfl: 0  •  TOUJEO SOLOSTAR 300 UNIT/ML solution pen-injector, Inject 60 Units as directed Daily. (Patient taking differently: Inject 160 Units as directed Every Night.), Disp: , Rfl: 0  •  TURMERIC PO, Take 500 mg by mouth 2 (Two) Times a Day., Disp: , Rfl:   •  vitamin C (ASCORBIC ACID) 500 MG tablet, Take 500 mg by mouth Daily., Disp: , Rfl:   •  Vortioxetine HBr (Trintellix) 20 MG tablet, Take 20 mg by mouth Daily., Disp: 30 tablet, Rfl: 5  •  zafirlukast (Accolate) 20 MG tablet, Take 1 tablet by mouth 2 (Two) Times a Day., Disp: 60 tablet, Rfl: 5    Objective   bsl > 400 at all times per patient     Physical Exam   All vitals recorded within this visit are reported by the patient.  General appearance: Normocephalic and nontraumatic  HEENT: Appears benign, hearing appears intact  Neck: Appears supple  Respiratory: Effort appears normal  Musculoskeletal: Moving all limbs  CNS: No gross motor or sensory deficits  Psychiatry: Alert and oriented x3  Memory appears intact  Mood and affect appears normal  Insight appears normal        Results for orders placed or performed during the hospital encounter of 05/04/20   Comprehensive  Metabolic Panel   Result Value Ref Range    Glucose 545 (C) 65 - 99 mg/dL    BUN 8 6 - 20 mg/dL    Creatinine 0.86 0.76 - 1.27 mg/dL    Sodium 133 (L) 136 - 145 mmol/L    Potassium 5.5 (H) 3.5 - 5.2 mmol/L    Chloride 94 (L) 98 - 107 mmol/L    CO2 21.8 (L) 22.0 - 29.0 mmol/L    Calcium 9.6 8.6 - 10.5 mg/dL    Total Protein 7.0 6.0 - 8.5 g/dL    Albumin 3.80 3.50 - 5.20 g/dL    ALT (SGPT) 79 (H) 1 - 41 U/L    AST (SGOT) 83 (H) 1 - 40 U/L    Alkaline Phosphatase 119 (H) 39 - 117 U/L    Total Bilirubin 1.0 0.2 - 1.2 mg/dL    eGFR Non African Amer 91 >60 mL/min/1.73    Globulin 3.2 gm/dL    A/G Ratio 1.2 g/dL    BUN/Creatinine Ratio 9.3 7.0 - 25.0    Anion Gap 17.2 (H) 5.0 - 15.0 mmol/L   Lipase   Result Value Ref Range    Lipase 14 13 - 60 U/L   Urinalysis With Microscopic If Indicated (No Culture) - Urine, Clean Catch   Result Value Ref Range    Color, UA Yellow Yellow, Straw    Appearance, UA Clear Clear    pH, UA 6.0 5.0 - 8.0    Specific Gravity, UA >=1.030 1.005 - 1.030    Glucose, UA >=1000 mg/dL (3+) (A) Negative    Ketones, UA Trace (A) Negative    Bilirubin, UA Negative Negative    Blood, UA Negative Negative    Protein, UA Negative Negative    Leuk Esterase, UA Negative Negative    Nitrite, UA Negative Negative    Urobilinogen, UA 1.0 E.U./dL 0.2 - 1.0 E.U./dL   Troponin   Result Value Ref Range    Troponin T 0.011 0.000 - 0.030 ng/mL   CBC Auto Differential   Result Value Ref Range    WBC 6.81 3.40 - 10.80 10*3/mm3    RBC 5.79 4.14 - 5.80 10*6/mm3    Hemoglobin 15.0 13.0 - 17.7 g/dL    Hematocrit 45.9 37.5 - 51.0 %    MCV 79.3 79.0 - 97.0 fL    MCH 25.9 (L) 26.6 - 33.0 pg    MCHC 32.7 31.5 - 35.7 g/dL    RDW 15.9 (H) 12.3 - 15.4 %    RDW-SD 45.0 37.0 - 54.0 fl    MPV 10.1 6.0 - 12.0 fL    Platelets 216 140 - 450 10*3/mm3    Neutrophil % 70.6 42.7 - 76.0 %    Lymphocyte % 18.8 (L) 19.6 - 45.3 %    Monocyte % 6.5 5.0 - 12.0 %    Eosinophil % 2.1 0.3 - 6.2 %    Basophil % 1.0 0.0 - 1.5 %    Immature Grans % 1.0  (H) 0.0 - 0.5 %    Neutrophils, Absolute 4.81 1.70 - 7.00 10*3/mm3    Lymphocytes, Absolute 1.28 0.70 - 3.10 10*3/mm3    Monocytes, Absolute 0.44 0.10 - 0.90 10*3/mm3    Eosinophils, Absolute 0.14 0.00 - 0.40 10*3/mm3    Basophils, Absolute 0.07 0.00 - 0.20 10*3/mm3    Immature Grans, Absolute 0.07 (H) 0.00 - 0.05 10*3/mm3    nRBC 0.0 0.0 - 0.2 /100 WBC   Blood Gas, Venous   Result Value Ref Range    Site OTHER     pH, Venous 7.393 7.320 - 7.420 pH Units    pCO2, Venous 40.1 40.0 - 50.0 mm Hg    pO2, Venous 92.1 (H) 30.0 - 50.0 mm Hg    HCO3, Venous 24.4 22.0 - 28.0 mmol/L    Base Excess, Venous -0.5 (L) 0.0 - 2.0 mmol/L    O2 Saturation, Venous 95.7 (H) 45.0 - 75.0 %    Barometric Pressure for Blood Gas 734 mmHg    Modality Room Air     FIO2 21 %    Ventilator Mode NA    POC Glucose Once   Result Value Ref Range    Glucose 518 (C) 70 - 130 mg/dL   POC Glucose Once   Result Value Ref Range    Glucose 351 (H) 70 - 130 mg/dL   Light Blue Top   Result Value Ref Range    Extra Tube hold for add-on    Green Top (Gel)   Result Value Ref Range    Extra Tube Hold for add-ons.    Lavender Top   Result Value Ref Range    Extra Tube hold for add-on    Gold Top - SST   Result Value Ref Range    Extra Tube Hold for add-ons.          Assessment/Plan   Denzel was seen today for hypertension and diabetes.    Diagnoses and all orders for this visit:    Benign essential hypertension    Uncontrolled type 2 diabetes mellitus with hyperglycemia (CMS/[facility])      Plan:  1.  Benign essential hypertension: Will continue current medication, low-sodium diet advised, Counseled to regularly check BP at home with goal averaging <130/80.   2.  Diabetes  Mellitus  : Reviewed fasting CMP  and hba1c  , diet and exercise counseled , Will continue current medications- toujeo 100 u bid and humalog  30u q3hrs, patient states he will contact endocrinology Dr. Grissom today for further recommendation on his elevated sugar levels     This was an audio and  video enabled telemedicine encounter.           Isaura Godoy MD

## 2020-05-14 ENCOUNTER — TELEMEDICINE (OUTPATIENT)
Dept: INTERNAL MEDICINE | Facility: CLINIC | Age: 60
End: 2020-05-14

## 2020-05-14 DIAGNOSIS — E11.65 UNCONTROLLED TYPE 2 DIABETES MELLITUS WITH HYPERGLYCEMIA (HCC): ICD-10-CM

## 2020-05-14 DIAGNOSIS — J30.1 SEASONAL ALLERGIC RHINITIS DUE TO POLLEN: ICD-10-CM

## 2020-05-14 DIAGNOSIS — I10 BENIGN ESSENTIAL HYPERTENSION: Primary | ICD-10-CM

## 2020-05-14 PROCEDURE — 99214 OFFICE O/P EST MOD 30 MIN: CPT | Performed by: INTERNAL MEDICINE

## 2020-05-14 RX ORDER — LISINOPRIL 5 MG/1
5 TABLET ORAL DAILY
COMMUNITY
Start: 2020-04-21 | End: 2020-08-19 | Stop reason: HOSPADM

## 2020-05-14 RX ORDER — LANCETS 33 GAUGE
EACH MISCELLANEOUS
Status: ON HOLD | COMMUNITY
Start: 2020-03-10 | End: 2020-08-05

## 2020-05-14 RX ORDER — ISOSORBIDE MONONITRATE 60 MG/1
60 TABLET, EXTENDED RELEASE ORAL DAILY
COMMUNITY
Start: 2020-04-16 | End: 2020-08-06 | Stop reason: HOSPADM

## 2020-05-14 RX ORDER — LEVOCETIRIZINE DIHYDROCHLORIDE 5 MG/1
5 TABLET, FILM COATED ORAL EVERY EVENING
Qty: 90 TABLET | Refills: 1 | Status: SHIPPED | OUTPATIENT
Start: 2020-05-14 | End: 2020-11-06 | Stop reason: SDUPTHER

## 2020-05-14 RX ORDER — TRAMADOL HYDROCHLORIDE 50 MG/1
50 TABLET ORAL EVERY 8 HOURS PRN
Status: ON HOLD | COMMUNITY
Start: 2020-05-12 | End: 2020-08-05

## 2020-05-14 RX ORDER — POTASSIUM CHLORIDE 750 MG/1
TABLET, FILM COATED, EXTENDED RELEASE ORAL
COMMUNITY
Start: 2020-03-20 | End: 2020-06-18 | Stop reason: SDUPTHER

## 2020-05-14 RX ORDER — HYDROCODONE BITARTRATE AND ACETAMINOPHEN 5; 325 MG/1; MG/1
1 TABLET ORAL EVERY 6 HOURS PRN
Status: ON HOLD | COMMUNITY
Start: 2020-04-29 | End: 2020-08-05

## 2020-05-14 RX ORDER — BUTALBITAL, ACETAMINOPHEN AND CAFFEINE 50; 325; 40 MG/1; MG/1; MG/1
1 TABLET ORAL EVERY 6 HOURS PRN
Status: ON HOLD | COMMUNITY
Start: 2020-04-10 | End: 2020-08-05

## 2020-05-14 RX ORDER — SEMAGLUTIDE 1.34 MG/ML
0.5 INJECTION, SOLUTION SUBCUTANEOUS WEEKLY
COMMUNITY
Start: 2020-05-06 | End: 2020-12-03 | Stop reason: SDUPTHER

## 2020-05-14 RX ORDER — BLOOD-GLUCOSE METER
EACH MISCELLANEOUS
Status: ON HOLD | COMMUNITY
Start: 2020-03-13 | End: 2020-08-05

## 2020-05-14 RX ORDER — TICAGRELOR 90 MG/1
TABLET ORAL
COMMUNITY
Start: 2020-03-20 | End: 2020-06-18

## 2020-05-14 NOTE — PROGRESS NOTES
..You have chosen to receive care through a telehealth visit.  Do you consent to use a video/audio connection for your medical care today? Yes    [id number]

## 2020-05-14 NOTE — PROGRESS NOTES
Subjective   Denzel Harding is a 60 y.o. male.     Chief Complaint   Patient presents with   • Follow-up     1 week   • Hypertension   • Diabetes   • Allergies       History of Present Illness   You have chosen to receive care through a telehealth visit.  Do you consent to use a video/audio connection for your medical care today? YES      HPI: Video visit with patient today   patient is following up on the blood pressure  The patient is taking the blood pressure medications as prescribed and has had no side effects.  He now sees cardiology Dr. Charles at [facility]. The patient is also here to follow on sugar and  due to get lab work done .  His sugars have been running very high and he had a recent ER visit for hyperglycemia in the ER, er visit for cough have been reviewed today, the patient also  Discussed his medications with endocrinology  And  His humalog was increased to 70 u tid and toujeo  120 u  Bid and ozempic to 50 /0.5 ml qweek , cycloset 3.2 mg qd  And sees endocrinology for labs in June 2020 and cardiology dr estrada  In august 2020, patient complains of allergies  Hypertension   Pertinent negatives include no chest pain, palpitations or shortness of breath.   Hyperlipidemia   Pertinent negatives include no chest pain or shortness of breath.   Diabetes   Pertinent negatives for hypoglycemia include no dizziness, nervousness/anxiousness or pallor. Pertinent negatives for diabetes include no chest pain, no shortness of breath  Patient is on acei/arb     During today's visit, I reviewed the documented allergies, medications, chief complaint, and pertinent vitals.  I have confirmed with the patient that there have been no changes since this information was discussed with my clinical team member.    The following portions of the patient's history were reviewed and updated as appropriate: allergies, current medications, past family history, past medical history, past social history, past surgical history  and problem list.    Review of Systems   Allergic/Immunologic: Positive for environmental allergies.   All other review of system negative per patient      Current Outpatient Medications:   •  albuterol sulfate HFA (Ventolin HFA) 108 (90 Base) MCG/ACT inhaler, Inhale 2 puffs Every 4 (Four) Hours As Needed for Wheezing or Shortness of Air., Disp: 1 inhaler, Rfl: 4  •  amitriptyline (ELAVIL) 25 MG tablet, Take 3 tablets by mouth every night at bedtime., Disp: 270 tablet, Rfl: 0  •  amLODIPine (NORVASC) 5 MG tablet, TAKE 1/2 TABLET BY MOUTH DAILY (Patient taking differently: Take 5 mg by mouth Daily.), Disp: 45 tablet, Rfl: 3  •  ASMANEX  MCG/ACT aerosol, Inhale 1 puff 2 (Two) Times a Day., Disp: 1 inhaler, Rfl: 5  •  aspirin 81 MG EC tablet, Take 81 mg by mouth Daily., Disp: , Rfl:   •  atorvastatin (LIPITOR) 80 MG tablet, Take 1 tablet by mouth Every Night., Disp: 30 tablet, Rfl: 5  •  azelastine (ASTELIN) 0.1 % nasal spray, 1 spray into the nostril(s) as directed by provider 2 (Two) Times a Day As Needed for Rhinitis or Allergies. Use in each nostril as directed, Disp: 1 each, Rfl: 5  •  Blood Glucose Monitoring Suppl (ONE TOUCH ULTRA 2) w/Device kit, , Disp: , Rfl:   •  budesonide-formoterol (Symbicort) 80-4.5 MCG/ACT inhaler, Inhale 2 puffs 2 (Two) Times a Day. Rinse mouth with water after use., Disp: 1 inhaler, Rfl: 5  •  bumetanide (BUMEX) 2 MG tablet, Take 1 tablet by mouth Daily. Take extra 2mg as needed for 3lb wt gain in 24hrs, increased shortness of breath, Disp: 60 tablet, Rfl: 5  •  clopidogrel (PLAVIX) 75 MG tablet, , Disp: , Rfl:   •  coenzyme Q10 100 MG capsule, Take 100 mg by mouth Daily., Disp: , Rfl:   •  CYCLOSET 0.8 MG tablet, Take 3.2 mg by mouth Every Morning., Disp: , Rfl: 0  •  DUPIXENT 300 MG/2ML solution prefilled syringe, , Disp: , Rfl:   •  gabapentin (NEURONTIN) 600 MG tablet, Take 600 mg by mouth 2 (Two) Times a Day., Disp: , Rfl: 0  •  HUMALOG KWIKPEN 100 UNIT/ML solution  pen-injector, Inject 10 Units under the skin 3 (Three) Times a Day With Meals. Follows SSI From PCP (Patient taking differently: Inject 70 Units under the skin into the appropriate area as directed 3 (Three) Times a Day With Meals. Follows SSI From PCP), Disp: , Rfl: 0  •  HYDROcodone-acetaminophen (NORCO) 5-325 MG per tablet, TK 1 T PO EVERY 6 HOURS AS NEEDED FOR PAIN, Disp: , Rfl:   •  ipratropium-albuterol (DUO-NEB) 0.5-2.5 mg/3 ml nebulizer, Take 3 mL by nebulization 4 (Four) Times a Day As Needed for Wheezing or Shortness of Air., Disp: 360 mL, Rfl: 5  •  isosorbide mononitrate (IMDUR) 30 MG 24 hr tablet, , Disp: , Rfl:   •  Lancets (ONETOUCH DELICA PLUS [id number]) misc, , Disp: , Rfl:   •  levocetirizine (XYZAL) 5 MG tablet, Take 1 tablet by mouth Every Evening., Disp: 90 tablet, Rfl: 1  •  lisinopril (PRINIVIL,ZESTRIL) 5 MG tablet, , Disp: , Rfl:   •  meclizine (ANTIVERT) 25 MG tablet, Take 1 tablet by mouth 3 (Three) Times a Day As Needed for dizziness., Disp: 10 tablet, Rfl: 0  •  metoprolol tartrate (LOPRESSOR) 50 MG tablet, take 1 tablet by mouth twice a day (Patient taking differently: Take 50 mg by mouth 2 (Two) Times a Day.), Disp: 180 tablet, Rfl: 3  •  omeprazole (priLOSEC) 20 MG capsule, Take 1 capsule by mouth Daily., Disp: 30 capsule, Rfl: 5  •  ONE TOUCH ULTRA TEST test strip, , Disp: , Rfl:   •  OZEMPIC, 0.25 OR 0.5 MG/DOSE, 2 MG/1.5ML solution pen-injector, 0.5 mg., Disp: , Rfl:   •  potassium chloride (K-DUR) 10 MEQ CR tablet, , Disp: , Rfl:   •  promethazine (PHENERGAN) 25 MG tablet, Take 1 tablet by mouth Every 6 (Six) Hours As Needed for Nausea or Vomiting., Disp: 20 tablet, Rfl: 0  •  spironolactone (ALDACTONE) 25 MG tablet, Take 1 tablet by mouth Daily. (Patient taking differently: Take 25 mg by mouth 2 (Two) Times a Day.), Disp: 30 tablet, Rfl: 5  •  Tiotropium Bromide Monohydrate (SPIRIVA RESPIMAT) 1.25 MCG/ACT aerosol solution inhaler, Inhale 2 puffs Daily., Disp: 1 inhaler, Rfl: 5  •   TiZANidine (ZANAFLEX) 4 MG capsule, Take 1 capsule by mouth 3 (Three) Times a Day., Disp: 20 capsule, Rfl: 0  •  TOUJEO SOLOSTAR 300 UNIT/ML solution pen-injector, Inject 60 Units as directed Daily. (Patient taking differently: Inject 120 Units as directed Every Night.), Disp: , Rfl: 0  •  traMADol (ULTRAM) 50 MG tablet, , Disp: , Rfl:   •  TURMERIC PO, Take 500 mg by mouth 2 (Two) Times a Day., Disp: , Rfl:   •  vitamin C (ASCORBIC ACID) 500 MG tablet, Take 500 mg by mouth Daily., Disp: , Rfl:   •  Vortioxetine HBr (Trintellix) 20 MG tablet, Take 20 mg by mouth Daily., Disp: 30 tablet, Rfl: 5  •  zafirlukast (Accolate) 20 MG tablet, Take 1 tablet by mouth 2 (Two) Times a Day., Disp: 60 tablet, Rfl: 5  •  BRILINTA 90 MG tablet tablet, , Disp: , Rfl:   •  butalbital-acetaminophen-caffeine (FIORICET, ESGIC) -40 MG per tablet, , Disp: , Rfl:   •  flunisolide (NASALIDE) 25 MCG/ACT (0.025%) solution nasal spray, Inhale 2 sprays Every 12 (Twelve) Hours., Disp: 25 mL, Rfl: 2  •  lisinopril (PRINIVIL,ZESTRIL) 20 MG tablet, Take 20 mg by mouth Daily., Disp: , Rfl:   •  metoclopramide (REGLAN) 5 MG tablet, , Disp: , Rfl:   •  potassium chloride (K-DUR,KLOR-CON) 20 MEQ CR tablet, Take 1 tablet by mouth Daily., Disp: 8 tablet, Rfl: 0    Objective      Physical Exam  All vitals recorded within this visit are reported by the patient.  General appearance: Normocephalic and nontraumatic  HEENT: Appears benign, hearing appears intact  Neck: Appears supple  Respiratory: Effort appears normal  Musculoskeletal: Moving all limbs  CNS: No gross motor or sensory deficits  Psychiatry: Alert and oriented x3  Memory appears intact  Mood and affect appears normal  Insight appears normal        Results for orders placed or performed during the hospital encounter of 05/04/20   Comprehensive Metabolic Panel   Result Value Ref Range    Glucose 545 (C) 65 - 99 mg/dL    BUN 8 6 - 20 mg/dL    Creatinine 0.86 0.76 - 1.27 mg/dL    Sodium 133 (L)  136 - 145 mmol/L    Potassium 5.5 (H) 3.5 - 5.2 mmol/L    Chloride 94 (L) 98 - 107 mmol/L    CO2 21.8 (L) 22.0 - 29.0 mmol/L    Calcium 9.6 8.6 - 10.5 mg/dL    Total Protein 7.0 6.0 - 8.5 g/dL    Albumin 3.80 3.50 - 5.20 g/dL    ALT (SGPT) 79 (H) 1 - 41 U/L    AST (SGOT) 83 (H) 1 - 40 U/L    Alkaline Phosphatase 119 (H) 39 - 117 U/L    Total Bilirubin 1.0 0.2 - 1.2 mg/dL    eGFR Non African Amer 91 >60 mL/min/1.73    Globulin 3.2 gm/dL    A/G Ratio 1.2 g/dL    BUN/Creatinine Ratio 9.3 7.0 - 25.0    Anion Gap 17.2 (H) 5.0 - 15.0 mmol/L   Lipase   Result Value Ref Range    Lipase 14 13 - 60 U/L   Urinalysis With Microscopic If Indicated (No Culture) - Urine, Clean Catch   Result Value Ref Range    Color, UA Yellow Yellow, Straw    Appearance, UA Clear Clear    pH, UA 6.0 5.0 - 8.0    Specific Gravity, UA >=1.030 1.005 - 1.030    Glucose, UA >=1000 mg/dL (3+) (A) Negative    Ketones, UA Trace (A) Negative    Bilirubin, UA Negative Negative    Blood, UA Negative Negative    Protein, UA Negative Negative    Leuk Esterase, UA Negative Negative    Nitrite, UA Negative Negative    Urobilinogen, UA 1.0 E.U./dL 0.2 - 1.0 E.U./dL   Troponin   Result Value Ref Range    Troponin T 0.011 0.000 - 0.030 ng/mL   CBC Auto Differential   Result Value Ref Range    WBC 6.81 3.40 - 10.80 10*3/mm3    RBC 5.79 4.14 - 5.80 10*6/mm3    Hemoglobin 15.0 13.0 - 17.7 g/dL    Hematocrit 45.9 37.5 - 51.0 %    MCV 79.3 79.0 - 97.0 fL    MCH 25.9 (L) 26.6 - 33.0 pg    MCHC 32.7 31.5 - 35.7 g/dL    RDW 15.9 (H) 12.3 - 15.4 %    RDW-SD 45.0 37.0 - 54.0 fl    MPV 10.1 6.0 - 12.0 fL    Platelets 216 140 - 450 10*3/mm3    Neutrophil % 70.6 42.7 - 76.0 %    Lymphocyte % 18.8 (L) 19.6 - 45.3 %    Monocyte % 6.5 5.0 - 12.0 %    Eosinophil % 2.1 0.3 - 6.2 %    Basophil % 1.0 0.0 - 1.5 %    Immature Grans % 1.0 (H) 0.0 - 0.5 %    Neutrophils, Absolute 4.81 1.70 - 7.00 10*3/mm3    Lymphocytes, Absolute 1.28 0.70 - 3.10 10*3/mm3    Monocytes, Absolute 0.44 0.10  - 0.90 10*3/mm3    Eosinophils, Absolute 0.14 0.00 - 0.40 10*3/mm3    Basophils, Absolute 0.07 0.00 - 0.20 10*3/mm3    Immature Grans, Absolute 0.07 (H) 0.00 - 0.05 10*3/mm3    nRBC 0.0 0.0 - 0.2 /100 WBC   Blood Gas, Venous   Result Value Ref Range    Site OTHER     pH, Venous 7.393 7.320 - 7.420 pH Units    pCO2, Venous 40.1 40.0 - 50.0 mm Hg    pO2, Venous 92.1 (H) 30.0 - 50.0 mm Hg    HCO3, Venous 24.4 22.0 - 28.0 mmol/L    Base Excess, Venous -0.5 (L) 0.0 - 2.0 mmol/L    O2 Saturation, Venous 95.7 (H) 45.0 - 75.0 %    Barometric Pressure for Blood Gas 734 mmHg    Modality Room Air     FIO2 21 %    Ventilator Mode NA    POC Glucose Once   Result Value Ref Range    Glucose 518 (C) 70 - 130 mg/dL   POC Glucose Once   Result Value Ref Range    Glucose 351 (H) 70 - 130 mg/dL   Light Blue Top   Result Value Ref Range    Extra Tube hold for add-on    Green Top (Gel)   Result Value Ref Range    Extra Tube Hold for add-ons.    Lavender Top   Result Value Ref Range    Extra Tube hold for add-on    Gold Top - SST   Result Value Ref Range    Extra Tube Hold for add-ons.          Assessment/Plan   Denzel was seen today for follow-up, hypertension, diabetes and allergies.    Diagnoses and all orders for this visit:    Benign essential hypertension    Uncontrolled type 2 diabetes mellitus with hyperglycemia (CMS/[facility])    Seasonal allergic rhinitis due to pollen    Other orders  -     levocetirizine (XYZAL) 5 MG tablet; Take 1 tablet by mouth Every Evening.      Plan:  1.  Benign essential hypertension: Will continue current medication, low-sodium diet advised, Counseled to regularly check BP at home with goal averaging <130/80.   2. Diabetes  Mellitus  : will obtain   fasting CMP  and hba1c  , diet and exercise counseled , Will continue current medications -per endocrinology  3.  Allergic rhinitis: Refilled Xyzal  This was an audio and video enabled telemedicine encounter.           Isaura Godoy MD

## 2020-05-18 DIAGNOSIS — R06.02 SHORTNESS OF BREATH: ICD-10-CM

## 2020-05-18 DIAGNOSIS — J45.50 SEVERE PERSISTENT ASTHMA WITHOUT COMPLICATION: Primary | ICD-10-CM

## 2020-05-18 RX ORDER — DUPILUMAB 300 MG/2ML
300 INJECTION, SOLUTION SUBCUTANEOUS
Qty: 2 SYRINGE | Refills: 11 | Status: SHIPPED | OUTPATIENT
Start: 2020-05-18 | End: 2020-10-22

## 2020-06-08 ENCOUNTER — OFFICE VISIT (OUTPATIENT)
Dept: INTERNAL MEDICINE | Facility: CLINIC | Age: 60
End: 2020-06-08

## 2020-06-08 VITALS
RESPIRATION RATE: 16 BRPM | DIASTOLIC BLOOD PRESSURE: 49 MMHG | TEMPERATURE: 97.3 F | HEART RATE: 67 BPM | OXYGEN SATURATION: 95 % | WEIGHT: 312 LBS | BODY MASS INDEX: 44.67 KG/M2 | SYSTOLIC BLOOD PRESSURE: 80 MMHG | HEIGHT: 70 IN

## 2020-06-08 DIAGNOSIS — F33.1 MODERATE EPISODE OF RECURRENT MAJOR DEPRESSIVE DISORDER (HCC): ICD-10-CM

## 2020-06-08 DIAGNOSIS — I50.9 CONGESTIVE HEART FAILURE, UNSPECIFIED HF CHRONICITY, UNSPECIFIED HEART FAILURE TYPE (HCC): ICD-10-CM

## 2020-06-08 DIAGNOSIS — J15.9 COMMUNITY ACQUIRED BACTERIAL PNEUMONIA: ICD-10-CM

## 2020-06-08 DIAGNOSIS — E11.65 UNCONTROLLED TYPE 2 DIABETES MELLITUS WITH HYPERGLYCEMIA (HCC): ICD-10-CM

## 2020-06-08 DIAGNOSIS — N18.9 CHRONIC KIDNEY DISEASE, UNSPECIFIED CKD STAGE: ICD-10-CM

## 2020-06-08 DIAGNOSIS — I95.9 TRANSIENT HYPOTENSION: Primary | ICD-10-CM

## 2020-06-08 PROCEDURE — 99214 OFFICE O/P EST MOD 30 MIN: CPT | Performed by: INTERNAL MEDICINE

## 2020-06-08 RX ORDER — TIZANIDINE HYDROCHLORIDE 4 MG/1
4 CAPSULE, GELATIN COATED ORAL NIGHTLY PRN
Qty: 30 CAPSULE | Refills: 5 | Status: SHIPPED | OUTPATIENT
Start: 2020-06-08 | End: 2020-10-13

## 2020-06-08 NOTE — PROGRESS NOTES
Transitional Care Follow Up Visit  Subjective     Denzel Harding is a 60 y.o. male who presents for a transitional care management visit.    Within 48 business hours after discharge our office contacted him via telephone to coordinate his care and needs.      I reviewed and discussed the details of that call along with the discharge summary, hospital problems, inpatient lab results, inpatient diagnostic studies, and consultation reports with Denzel.     Current outpatient and discharge medications have been reconciled for the patient.  Reviewed by: Isaura Godoy MD      Date of TCM Phone Call 3/2/2020   Hospital [facility]   Date of Admission 2/24/2020   Date of Discharge 2/27/2020   Discharge Disposition Home or Self Care     Chief Complaint   Patient presents with   • Follow-up     hospital admit [facility] 5/16 discharge 6/4   • Diabetes   • Leg Swelling   • Congestive Heart Failure   • Pneumonia   • Depression       Risk for Readmission (LACE) No data recorded    History of Present Illness   Course During Hospital Stay:  The patient is also here to follow up on ER visit and hospitalization  at  [facility]  Admitted  5-  And discharged 6-4-2020 ,   Hospital records ,  Lab reports  and Radiology reports  Are yet pending ,his medications have been updated . He was admitted for uncontrolled Diabetes and CHF ,  He sees dr steward   For his sugar -  endocrinology and currently he is on toujeo  180 u  Bid  , humalog 80 tid and RISS , ozempic 0.5 mg dose q week  and cycloset 0.8 mg x 4 pills daily per endocrinology, he also saw dr estrada  And dr ruiz for his cardiac issues at [facility], he also  Had pneumonia and sent home on keflex which she has completed, he saw a  Nephrologist for renal insufficiency while he was hospitalized , patient is also here to follow-up on his blood pressure which is currently running on the lower side today, he also complains of depression and needs refills  on his medications     The following portions of the patient's history were reviewed and updated as appropriate: allergies, current medications, past family history, past medical history, past social history, past surgical history and problem list.    Review of Systems   Constitutional: Negative for appetite change, fatigue and fever.   HENT: Negative for congestion, ear discharge, ear pain, sinus pressure and sore throat.    Eyes: Negative for pain and discharge.   Respiratory: Negative for cough, shortness of breath and wheezing.    Cardiovascular: Negative for chest pain, palpitations and leg swelling.   Gastrointestinal: Negative for abdominal pain, constipation, diarrhea, nausea and vomiting.   Endocrine: Negative for cold intolerance and heat intolerance.   Genitourinary: Negative for dysuria and flank pain.   Musculoskeletal: Negative for arthralgias and joint swelling.   Skin: Negative for pallor and rash.   Allergic/Immunologic: Negative for environmental allergies and food allergies.   Neurological: Negative for dizziness, weakness and numbness.   Hematological: Negative for adenopathy. Does not bruise/bleed easily.   Psychiatric/Behavioral: Positive for dysphoric mood. Negative for behavioral problems. The patient is not nervous/anxious.        Objective   Physical Exam   Constitutional: He is oriented to person, place, and time. He appears well-developed and well-nourished. No distress.   HENT:   Head: Normocephalic and atraumatic.   Right Ear: External ear normal.   Left Ear: External ear normal.   Nose: Nose normal.   Mouth/Throat: Oropharynx is clear and moist.   Eyes: Pupils are equal, round, and reactive to light. Conjunctivae and EOM are normal.   Neck: Normal range of motion. Neck supple. No thyromegaly present.   Cardiovascular: Normal rate, regular rhythm and normal heart sounds.   Pulmonary/Chest: Effort normal. No respiratory distress.   Abdominal: Soft. He exhibits no distension. There is no  tenderness. There is no guarding.   Musculoskeletal: Normal range of motion. He exhibits no edema.   Lymphadenopathy:     He has no cervical adenopathy.   Neurological: He is alert and oriented to person, place, and time.   No gross motor or sensory deficits   Skin: Skin is warm and dry. He is not diaphoretic. No erythema.   Psychiatric: He has a normal mood and affect.   Nursing note and vitals reviewed.      Assessment/Plan   Denzel was seen today for follow-up, diabetes, leg swelling, congestive heart failure, pneumonia and depression.    Diagnoses and all orders for this visit:    Transient hypotension    Uncontrolled type 2 diabetes mellitus with hyperglycemia (CMS/[facility])    Congestive heart failure, unspecified HF chronicity, unspecified heart failure type (CMS/[facility])    Community acquired bacterial pneumonia    Moderate episode of recurrent major depressive disorder (CMS/[facility])    Chronic kidney disease, unspecified CKD stage    Other orders  -     TiZANidine (Zanaflex) 4 MG capsule; Take 1 capsule by mouth At Night As Needed for Muscle Spasms.  -     Discontinue: Vortioxetine HBr (Trintellix) 20 MG tablet; Take 20 mg by mouth Daily.  -     Vortioxetine HBr (Trintellix) 20 MG tablet; Take 20 mg by mouth Daily.    Plan:  1.  Transient hypotension: Oral hydration advised Will continue current medication, patient advised to hold Norvasc given his low blood pressure, low-sodium diet advised, Counseled to regularly check BP at home    2. Diabetes  Mellitus uncontrolled : Reviewed fasting CMP  and hba1c  , diet and exercise counseled , Will continue current medications-per endocrinology, he is on several medications in high dose insulin  3.  CHF: To continue current medications per cardiology  4.  CRI: To continue per nephrology  5.  Community-acquired bacterial pneumonia: Completed antibiotics  6.  Major depression: Refill Trintellix

## 2020-06-17 ENCOUNTER — OFFICE VISIT (OUTPATIENT)
Dept: GASTROENTEROLOGY | Facility: CLINIC | Age: 60
End: 2020-06-17

## 2020-06-17 ENCOUNTER — LAB (OUTPATIENT)
Dept: LAB | Facility: HOSPITAL | Age: 60
End: 2020-06-17

## 2020-06-17 ENCOUNTER — TELEPHONE (OUTPATIENT)
Dept: GASTROENTEROLOGY | Facility: CLINIC | Age: 60
End: 2020-06-17

## 2020-06-17 VITALS
TEMPERATURE: 98 F | DIASTOLIC BLOOD PRESSURE: 70 MMHG | HEART RATE: 88 BPM | SYSTOLIC BLOOD PRESSURE: 110 MMHG | WEIGHT: 307 LBS | BODY MASS INDEX: 43.95 KG/M2 | HEIGHT: 70 IN | RESPIRATION RATE: 18 BRPM | OXYGEN SATURATION: 95 %

## 2020-06-17 DIAGNOSIS — K76.0 FATTY LIVER: ICD-10-CM

## 2020-06-17 DIAGNOSIS — E78.5 HYPERLIPIDEMIA, UNSPECIFIED HYPERLIPIDEMIA TYPE: ICD-10-CM

## 2020-06-17 DIAGNOSIS — Z11.59 ENCOUNTER FOR SCREENING FOR OTHER VIRAL DISEASES: ICD-10-CM

## 2020-06-17 DIAGNOSIS — Z86.010 PERSONAL HISTORY OF COLONIC POLYPS: ICD-10-CM

## 2020-06-17 DIAGNOSIS — E66.01 MORBIDLY OBESE (HCC): ICD-10-CM

## 2020-06-17 DIAGNOSIS — Z01.818 PREOP TESTING: Primary | ICD-10-CM

## 2020-06-17 DIAGNOSIS — K21.9 GASTROESOPHAGEAL REFLUX DISEASE WITHOUT ESOPHAGITIS: ICD-10-CM

## 2020-06-17 DIAGNOSIS — R79.89 ELEVATED LFTS: Primary | ICD-10-CM

## 2020-06-17 DIAGNOSIS — R79.89 ELEVATED LFTS: ICD-10-CM

## 2020-06-17 DIAGNOSIS — Z01.818 PREOP TESTING: ICD-10-CM

## 2020-06-17 LAB
FERRITIN SERPL-MCNC: 109 NG/ML (ref 30–400)
HBV SURFACE AB SER RIA-ACNC: NORMAL
INR PPP: 0.98 (ref 0.9–1.1)
IRON 24H UR-MRATE: 61 MCG/DL (ref 59–158)
IRON SATN MFR SERPL: 15 % (ref 20–50)
PROTHROMBIN TIME: 13.4 SECONDS (ref 12–15.1)
TIBC SERPL-MCNC: 401 MCG/DL (ref 298–536)
TRANSFERRIN SERPL-MCNC: 269 MG/DL (ref 200–360)

## 2020-06-17 PROCEDURE — 82977 ASSAY OF GGT: CPT

## 2020-06-17 PROCEDURE — 82947 ASSAY GLUCOSE BLOOD QUANT: CPT

## 2020-06-17 PROCEDURE — 84460 ALANINE AMINO (ALT) (SGPT): CPT

## 2020-06-17 PROCEDURE — 84466 ASSAY OF TRANSFERRIN: CPT

## 2020-06-17 PROCEDURE — 99204 OFFICE O/P NEW MOD 45 MIN: CPT | Performed by: INTERNAL MEDICINE

## 2020-06-17 PROCEDURE — 82728 ASSAY OF FERRITIN: CPT

## 2020-06-17 PROCEDURE — 84478 ASSAY OF TRIGLYCERIDES: CPT

## 2020-06-17 PROCEDURE — 86708 HEPATITIS A ANTIBODY: CPT

## 2020-06-17 PROCEDURE — 83010 ASSAY OF HAPTOGLOBIN QUANT: CPT

## 2020-06-17 PROCEDURE — 83516 IMMUNOASSAY NONANTIBODY: CPT

## 2020-06-17 PROCEDURE — 83540 ASSAY OF IRON: CPT

## 2020-06-17 PROCEDURE — 82465 ASSAY BLD/SERUM CHOLESTEROL: CPT

## 2020-06-17 PROCEDURE — 85610 PROTHROMBIN TIME: CPT

## 2020-06-17 PROCEDURE — 86706 HEP B SURFACE ANTIBODY: CPT

## 2020-06-17 PROCEDURE — 86038 ANTINUCLEAR ANTIBODIES: CPT

## 2020-06-17 PROCEDURE — 82390 ASSAY OF CERULOPLASMIN: CPT

## 2020-06-17 PROCEDURE — 83883 ASSAY NEPHELOMETRY NOT SPEC: CPT

## 2020-06-17 PROCEDURE — 82172 ASSAY OF APOLIPOPROTEIN: CPT

## 2020-06-17 PROCEDURE — 84450 TRANSFERASE (AST) (SGOT): CPT

## 2020-06-17 PROCEDURE — 82247 BILIRUBIN TOTAL: CPT

## 2020-06-17 PROCEDURE — 36415 COLL VENOUS BLD VENIPUNCTURE: CPT

## 2020-06-17 RX ORDER — SODIUM CHLORIDE 9 MG/ML
70 INJECTION, SOLUTION INTRAVENOUS CONTINUOUS PRN
Status: CANCELLED | OUTPATIENT
Start: 2020-06-17

## 2020-06-17 RX ORDER — BISACODYL 5 MG/1
20 TABLET, DELAYED RELEASE ORAL ONCE
Qty: 4 TABLET | Refills: 0 | Status: SHIPPED | OUTPATIENT
Start: 2020-06-17 | End: 2020-06-18

## 2020-06-17 NOTE — PROGRESS NOTES
New Patient Consult      Date: 2020   Patient Name: Denzel Harding  MRN: [id number]  : 1960     Referring Physician: Isaura Godoy MD    Chief Complaint   Patient presents with   • Colon Cancer Screening       History of Present Illness: Denzel Harding is a 60 y.o. male who is here today to establish care with Gastroenterology for  Colon cancer screening.     This patient deny any chronic abdominal pain except occasional RLQ  discomfort with occasional bloating. Deny any recent change in bowel habit, hematochezia or melena.  Normalyy will have BM once in 1-2 days, firm stool. Weight is stable. Pt denies nausea vomiting or odynophagia or dysphagia. There is a history of acid reflux, He is on ppi with good control.   There is no history of anemia. Prior history of EGD or colonoscopy in . Few polyps removed.  No family history of colon cancer or any GI malignancy. No history of any abdominal surgery except GB removed. Denies alcohol abuse or cigarette smoking.   He is on ASA and plavix. He had a CABG for CAD  He is on narco rarely.     Subjective      Past Medical History:   Past Medical History:   Diagnosis Date   • Anxiety    • Arthritis    • Asthma    • Brachial neuritis 2017   • Cellulitis     left arm and right leg    • Chest pain    • CHF (congestive heart failure) (CMS/HCC)    • COPD (chronic obstructive pulmonary disease) (CMS/HCC)    • Coronary artery disease    • Depression    • Diabetes mellitus (CMS/HCC)     diagnosed in , checks fsbg 3-4x/day   • Dizziness    • Edema    • GERD (gastroesophageal reflux disease)    • H/O chest x-ray 2015    Mild Left base atelectasis   • H/O echocardiogram 2015    Normal LVSF. EF of 60-65%. Grade 1 diastolic dysfunction of the LV myocardium. No evidence of pericardial effusion   • H/O exercise stress test    • History of herniated intervertebral disc     History of left L5-S1 disc herniation   • Hypertension    • LOM (loss of  memory) 1/19/2017   • Lower back pain     Right   • Measles    • Neuropathy     feet    • EDD treated with BiPAP     setting 13 and 5   • Palpitations    • Pericardial effusion    • Renal disorder    • Seizure disorder (CMS/HCC)    • Seizures (CMS/HCC)     FOCAL last 2009   • Shortness of breath 1/19/2017   • SOB (shortness of breath)    • Staph infection     back after sugery at the incision site    • Stroke (CMS/HCC)     x2 most recent , slight weakness in hands occasionaly    • Wears glasses        Past Surgical History:   Past Surgical History:   Procedure Laterality Date   • BACK SURGERY     • CARDIAC CATHETERIZATION     • CARDIAC CATHETERIZATION N/A 8/11/2017    Procedure: Coronary angiography;  Surgeon: Dallas Kim MD;  Location: [facility] CATH INVASIVE LOCATION;  Service:    • CARDIAC CATHETERIZATION N/A 8/11/2017    Procedure: Left Heart Cath;  Surgeon: Dallas Kim MD;  Location: [facility] CATH INVASIVE LOCATION;  Service:    • CARDIAC CATHETERIZATION N/A 8/11/2017    Procedure: Left ventriculography;  Surgeon: Dallas Kim MD;  Location: [facility] CATH INVASIVE LOCATION;  Service:    • CARDIOVASCULAR STRESS TEST  07/03/2017    WITH DR KIM AT [facility]   • CARPAL TUNNEL RELEASE Right    • CATARACT EXTRACTION W/ INTRAOCULAR LENS IMPLANT Right 6/12/2017    Procedure: CATARACT PHACO EXTRACTION WITH INTRAOCULAR LENS IMPLANT RIGHT ;  Surgeon: Natalie Cao MD;  Location: [facility];  Service:    • CATARACT EXTRACTION W/ INTRAOCULAR LENS IMPLANT Left 7/10/2017    Procedure: CATARACT PHACO EXTRACTION WITH INTRAOCULAR LENS IMPLANT LEFT;  Surgeon: Natalie Cao MD;  Location: [facility] OR;  Service:    • CHOLECYSTECTOMY     • CHOLECYSTECTOMY     • COLONOSCOPY     • CORONARY ANGIOPLASTY WITH STENT PLACEMENT      X1-LAD   • CORONARY ARTERY BYPASS GRAFT N/A 8/15/2017    Procedure: CORONARY ARTERY BYPASS GRAFTING x 3 UTILIZING THE LEFT INTERNAL MAMMARY ARTERY WITH ENDOSCOPIC VEIN HARVESTING OF THE RIGHT  GREATER SAPHENOUS VEIN, HAMLET, LAD ENDARECTOMY;  Surgeon: London Damon MD;  Location:  [facility] OR;  Service:    • ENDOSCOPY     • EYE CAPSULOTOMY WITH LASER Right 11/25/2019    Procedure: EYE CAPSULOTOMY WITH LASER RIGHT;  Surgeon: Natalie Cao MD;  Location:  [facility];  Service: Ophthalmology   • EYE CAPSULOTOMY WITH LASER Left 12/9/2019    Procedure: EYE CAPSULOTOMY WITH LASER LEFT;  Surgeon: Natalie Cao MD;  Location:  [facility];  Service: Ophthalmology   • EYE SURGERY      cataract surgery both eyes   • INTERVENTIONAL RADIOLOGY PROCEDURE Bilateral 12/31/2019    Procedure: Carotid Cerebral Angiogram;  Surgeon: Damian Dubois MD;  Location: [facility] CATH INVASIVE LOCATION;  Service: Interventional Radiology   • KNEE ARTHROSCOPY Bilateral    • KNEE ARTHROSCOPY Right 2010    Dr Lewis   • KNEE ARTHROSCOPY Left 2008    Dr Jameson   • NEUROPLASTY / TRANSPOSITION ULNAR NERVE AT ELBOW     • OTHER SURGICAL HISTORY      Foraminotomy and discectomy   • PERICARDIAL WINDOW N/A 8/25/2017    Procedure: PERICARDIAL WINDOW;  Surgeon: Freeman Phillips MD;  Location:  [facility] OR;  Service:        Family History:   Family History   Problem Relation Age of Onset   • Hypertension Mother    • Arthritis Mother    • Alcohol abuse Father    • Heart disease Other    • Stroke Other    • Hypertension Other    • Other Other         Neurologic disorder   • Parkinsonism Other    • Stroke Other    • Heart disease Other    • Hypertension Other    • Heart disease Other    • Stroke Other    • Hypertension Other        Social History:   Social History     Socioeconomic History   • Marital status:      Spouse name: Not on file   • Number of children: 1   • Years of education: Not on file   • Highest education level: Not on file   Occupational History   • Occupation: [employer]     Employer: DISABLED     Comment: Disabled since 2002-Back Problems   Tobacco Use   • Smoking status: Former Smoker     Packs/day: 1.00     Years:  10.00     Pack years: 10.00     Types: Cigarettes     Last attempt to quit: 1998     Years since quittin.4   • Smokeless tobacco: Former User     Types: Snuff     Quit date:    Substance and Sexual Activity   • Alcohol use: Yes     Frequency: Monthly or less     Drinks per session: 3 or 4     Comment: 3 PER YEAR   • Drug use: No   • Sexual activity: Defer   Social History Narrative    Caffeine use: 0-1 serving daily.    Patient lives at home with wife.          Current Outpatient Medications:   •  albuterol sulfate HFA (Ventolin HFA) 108 (90 Base) MCG/ACT inhaler, Inhale 2 puffs Every 4 (Four) Hours As Needed for Wheezing or Shortness of Air., Disp: 1 inhaler, Rfl: 4  •  amitriptyline (ELAVIL) 25 MG tablet, Take 3 tablets by mouth every night at bedtime., Disp: 270 tablet, Rfl: 0  •  amLODIPine (NORVASC) 5 MG tablet, TAKE 1/2 TABLET BY MOUTH DAILY (Patient taking differently: Take 5 mg by mouth Daily.), Disp: 45 tablet, Rfl: 3  •  ASMANEX  MCG/ACT aerosol, Inhale 1 puff 2 (Two) Times a Day., Disp: 1 inhaler, Rfl: 5  •  aspirin 81 MG EC tablet, Take 81 mg by mouth Daily., Disp: , Rfl:   •  atorvastatin (LIPITOR) 80 MG tablet, Take 1 tablet by mouth Every Night., Disp: 30 tablet, Rfl: 5  •  azelastine (ASTELIN) 0.1 % nasal spray, 1 spray into the nostril(s) as directed by provider 2 (Two) Times a Day As Needed for Rhinitis or Allergies. Use in each nostril as directed, Disp: 1 each, Rfl: 5  •  Blood Glucose Monitoring Suppl (ONE TOUCH ULTRA 2) w/Device kit, , Disp: , Rfl:   •  BRILINTA 90 MG tablet tablet, , Disp: , Rfl:   •  budesonide-formoterol (Symbicort) 80-4.5 MCG/ACT inhaler, Inhale 2 puffs 2 (Two) Times a Day. Rinse mouth with water after use., Disp: 1 inhaler, Rfl: 5  •  bumetanide (BUMEX) 2 MG tablet, Take 1 tablet by mouth Daily. Take extra 2mg as needed for 3lb wt gain in 24hrs, increased shortness of breath, Disp: 60 tablet, Rfl: 5  •  butalbital-acetaminophen-caffeine (FIORICET,  ESGIC) -40 MG per tablet, , Disp: , Rfl:   •  clopidogrel (PLAVIX) 75 MG tablet, , Disp: , Rfl:   •  coenzyme Q10 100 MG capsule, Take 100 mg by mouth Daily., Disp: , Rfl:   •  CYCLOSET 0.8 MG tablet, Take 3.2 mg by mouth Every Morning., Disp: , Rfl: 0  •  DUPIXENT 300 MG/2ML solution prefilled syringe, Inject 300 mg under the skin into the appropriate area as directed Every 14 (Fourteen) Days., Disp: 2 syringe, Rfl: 11  •  flunisolide (NASALIDE) 25 MCG/ACT (0.025%) solution nasal spray, Inhale 2 sprays Every 12 (Twelve) Hours., Disp: 25 mL, Rfl: 2  •  gabapentin (NEURONTIN) 600 MG tablet, Take 600 mg by mouth 2 (Two) Times a Day., Disp: , Rfl: 0  •  HUMALOG KWIKPEN 100 UNIT/ML solution pen-injector, Inject 10 Units under the skin 3 (Three) Times a Day With Meals. Follows SSI From PCP (Patient taking differently: Inject 70 Units under the skin into the appropriate area as directed 3 (Three) Times a Day With Meals. Follows SSI From PCP), Disp: , Rfl: 0  •  HYDROcodone-acetaminophen (NORCO) 5-325 MG per tablet, TK 1 T PO EVERY 6 HOURS AS NEEDED FOR PAIN, Disp: , Rfl:   •  ipratropium-albuterol (DUO-NEB) 0.5-2.5 mg/3 ml nebulizer, Take 3 mL by nebulization 4 (Four) Times a Day As Needed for Wheezing or Shortness of Air., Disp: 360 mL, Rfl: 5  •  isosorbide mononitrate (IMDUR) 30 MG 24 hr tablet, , Disp: , Rfl:   •  Lancets (ONETOUCH DELICA PLUS [id number]) misc, , Disp: , Rfl:   •  levocetirizine (XYZAL) 5 MG tablet, Take 1 tablet by mouth Every Evening., Disp: 90 tablet, Rfl: 1  •  lisinopril (PRINIVIL,ZESTRIL) 5 MG tablet, , Disp: , Rfl:   •  meclizine (ANTIVERT) 25 MG tablet, Take 1 tablet by mouth 3 (Three) Times a Day As Needed for dizziness., Disp: 10 tablet, Rfl: 0  •  metoclopramide (REGLAN) 5 MG tablet, , Disp: , Rfl:   •  omeprazole (priLOSEC) 20 MG capsule, Take 1 capsule by mouth Daily., Disp: 30 capsule, Rfl: 5  •  ONE TOUCH ULTRA TEST test strip, , Disp: , Rfl:   •  OZEMPIC, 0.25 OR 0.5 MG/DOSE, 2  MG/1.5ML solution pen-injector, 0.5 mg., Disp: , Rfl:   •  potassium chloride (K-DUR) 10 MEQ CR tablet, , Disp: , Rfl:   •  potassium chloride (K-DUR,KLOR-CON) 20 MEQ CR tablet, Take 1 tablet by mouth Daily., Disp: 8 tablet, Rfl: 0  •  promethazine (PHENERGAN) 25 MG tablet, Take 1 tablet by mouth Every 6 (Six) Hours As Needed for Nausea or Vomiting., Disp: 20 tablet, Rfl: 0  •  spironolactone (ALDACTONE) 25 MG tablet, Take 1 tablet by mouth Daily. (Patient taking differently: Take 25 mg by mouth 2 (Two) Times a Day.), Disp: 30 tablet, Rfl: 5  •  Tiotropium Bromide Monohydrate (SPIRIVA RESPIMAT) 1.25 MCG/ACT aerosol solution inhaler, Inhale 2 puffs Daily., Disp: 1 inhaler, Rfl: 5  •  TiZANidine (Zanaflex) 4 MG capsule, Take 1 capsule by mouth At Night As Needed for Muscle Spasms., Disp: 30 capsule, Rfl: 5  •  TOUJEO SOLOSTAR 300 UNIT/ML solution pen-injector, Inject 60 Units as directed Daily. (Patient taking differently: Inject 120 Units as directed Every Night.), Disp: , Rfl: 0  •  traMADol (ULTRAM) 50 MG tablet, , Disp: , Rfl:   •  TURMERIC PO, Take 500 mg by mouth 2 (Two) Times a Day., Disp: , Rfl:   •  vitamin C (ASCORBIC ACID) 500 MG tablet, Take 500 mg by mouth Daily., Disp: , Rfl:   •  Vortioxetine HBr (Trintellix) 20 MG tablet, Take 20 mg by mouth Daily., Disp: 30 tablet, Rfl: 5  •  zafirlukast (Accolate) 20 MG tablet, Take 1 tablet by mouth 2 (Two) Times a Day., Disp: 60 tablet, Rfl: 5  •  bisacodyl (DULCOLAX) 5 MG EC tablet, Take 4 tablets by mouth 1 (One) Time for 1 dose. Please see prep instructions from office., Disp: 4 tablet, Rfl: 0  •  polyethylene glycol (GoLYTELY) 236 g solution, Take 4,000 mL by mouth 1 (One) Time for 1 dose. Please see prep instructions from office., Disp: 4000 mL, Rfl: 0    Allergies   Allergen Reactions   • Sulfa Antibiotics Anaphylaxis, Nausea And Vomiting and Delirium   • Invokana [Canagliflozin] Unknown - Low Severity     DKA   • Codeine Nausea And Vomiting     Can only take  "with Phenergan   • Morphine Unknown - Low Severity     Does not work. It causes pain       Review of Systems:   Review of Systems   Constitutional: Negative for appetite change, fatigue, fever and unexpected weight loss.   Respiratory: Negative for cough, shortness of breath and wheezing.    Cardiovascular: Negative for chest pain, palpitations and leg swelling.   Gastrointestinal: Positive for abdominal pain, nausea, GERD and indigestion. Negative for abdominal distention, anal bleeding, blood in stool, constipation, diarrhea, rectal pain and vomiting.   Genitourinary: Negative for dysuria, frequency and hematuria.   Musculoskeletal: Negative for back pain and joint swelling.   Skin: Negative for color change, rash and skin lesions.   Neurological: Negative for dizziness, syncope, speech difficulty, weakness, headache and memory problem.   Hematological: Negative for adenopathy. Does not bruise/bleed easily.   Psychiatric/Behavioral: Negative for agitation, behavioral problems, suicidal ideas and depressed mood.       The following portions of the patient's history were reviewed and updated as appropriate: allergies, current medications, past family history, past medical history, past social history, past surgical history and problem list.    Objective     Physical Exam:  Vital Signs:   Vitals:    06/17/20 1335   BP: 110/70   Pulse: 88   Resp: 18   Temp: 98 °F (36.7 °C)   TempSrc: Temporal   SpO2: 95%   Weight: (!) 139 kg (307 lb)   Height: 177.8 cm (70\")       Physical Exam   Constitutional: He is oriented to person, place, and time. He appears well-developed and well-nourished.   Is morbidly obese   HENT:   Head: Normocephalic and atraumatic.   Right Ear: External ear normal.   Left Ear: External ear normal.   Mouth/Throat: Oropharynx is clear and moist.   Eyes: Pupils are equal, round, and reactive to light. Conjunctivae and EOM are normal.   Neck: Normal range of motion. No tracheal deviation present. No " thyromegaly present.   Cardiovascular: Normal rate and regular rhythm.   No murmur heard.  Pulmonary/Chest: Effort normal and breath sounds normal. No respiratory distress.   Prior CABG scar noted   Abdominal: Soft. Bowel sounds are normal. He exhibits no distension and no mass. There is no tenderness. No hernia.   Large fatty abdominal wall noted   Musculoskeletal: Normal range of motion. He exhibits no edema (Minimal extremity edema noted).   Neurological: He is alert and oriented to person, place, and time. No cranial nerve deficit or sensory deficit.   Skin: Skin is warm and dry.   Psychiatric: He has a normal mood and affect. His behavior is normal. Judgment and thought content normal.   Nursing note and vitals reviewed.      Results Review:   I have reviewed the patient's new clinical and imaging results and agree with the interpretation.     Admission on 05/04/2020, Discharged on 05/04/2020   Component Date Value Ref Range Status   • Extra Tube 05/04/2020 hold for add-on   Final    Auto resulted   • Extra Tube 05/04/2020 Hold for add-ons.   Final    Auto resulted.   • Extra Tube 05/04/2020 hold for add-on   Final    Auto resulted   • Extra Tube 05/04/2020 Hold for add-ons.   Final    Auto resulted.   • Glucose 05/04/2020 545* 65 - 99 mg/dL Final   • BUN 05/04/2020 8  6 - 20 mg/dL Final   • Creatinine 05/04/2020 0.86  0.76 - 1.27 mg/dL Final   • Sodium 05/04/2020 133* 136 - 145 mmol/L Final   • Potassium 05/04/2020 5.5* 3.5 - 5.2 mmol/L Final   • Chloride 05/04/2020 94* 98 - 107 mmol/L Final   • CO2 05/04/2020 21.8* 22.0 - 29.0 mmol/L Final   • Calcium 05/04/2020 9.6  8.6 - 10.5 mg/dL Final   • Total Protein 05/04/2020 7.0  6.0 - 8.5 g/dL Final   • Albumin 05/04/2020 3.80  3.50 - 5.20 g/dL Final   • ALT (SGPT) 05/04/2020 79* 1 - 41 U/L Final   • AST (SGOT) 05/04/2020 83* 1 - 40 U/L Final   • Alkaline Phosphatase 05/04/2020 119* 39 - 117 U/L Final   • Total Bilirubin 05/04/2020 1.0  0.2 - 1.2 mg/dL Final   •  eGFR Non African Amer 05/04/2020 91  >60 mL/min/1.73 Final   • Globulin 05/04/2020 3.2  gm/dL Final   • A/G Ratio 05/04/2020 1.2  g/dL Final   • BUN/Creatinine Ratio 05/04/2020 9.3  7.0 - 25.0 Final   • Anion Gap 05/04/2020 17.2* 5.0 - 15.0 mmol/L Final   • Lipase 05/04/2020 14  13 - 60 U/L Final   • Color, UA 05/04/2020 Yellow  Yellow, Straw Final   • Appearance, UA 05/04/2020 Clear  Clear Final   • pH, UA 05/04/2020 6.0  5.0 - 8.0 Final   • Specific Gravity, UA 05/04/2020 >=1.030  1.005 - 1.030 Final   • Glucose, UA 05/04/2020 >=1000 mg/dL (3+)* Negative Final   • Ketones, UA 05/04/2020 Trace* Negative Final   • Bilirubin, UA 05/04/2020 Negative  Negative Final   • Blood, UA 05/04/2020 Negative  Negative Final   • Protein, UA 05/04/2020 Negative  Negative Final   • Leuk Esterase, UA 05/04/2020 Negative  Negative Final   • Nitrite, UA 05/04/2020 Negative  Negative Final   • Urobilinogen, UA 05/04/2020 1.0 E.U./dL  0.2 - 1.0 E.U./dL Final   • Troponin T 05/04/2020 0.011  0.000 - 0.030 ng/mL Final   • WBC 05/04/2020 6.81  3.40 - 10.80 10*3/mm3 Final   • RBC 05/04/2020 5.79  4.14 - 5.80 10*6/mm3 Final   • Hemoglobin 05/04/2020 15.0  13.0 - 17.7 g/dL Final   • Hematocrit 05/04/2020 45.9  37.5 - 51.0 % Final   • MCV 05/04/2020 79.3  79.0 - 97.0 fL Final   • MCH 05/04/2020 25.9* 26.6 - 33.0 pg Final   • MCHC 05/04/2020 32.7  31.5 - 35.7 g/dL Final   • RDW 05/04/2020 15.9* 12.3 - 15.4 % Final   • RDW-SD 05/04/2020 45.0  37.0 - 54.0 fl Final   • MPV 05/04/2020 10.1  6.0 - 12.0 fL Final   • Platelets 05/04/2020 216  140 - 450 10*3/mm3 Final   • Neutrophil % 05/04/2020 70.6  42.7 - 76.0 % Final   • Lymphocyte % 05/04/2020 18.8* 19.6 - 45.3 % Final   • Monocyte % 05/04/2020 6.5  5.0 - 12.0 % Final   • Eosinophil % 05/04/2020 2.1  0.3 - 6.2 % Final   • Basophil % 05/04/2020 1.0  0.0 - 1.5 % Final   • Immature Grans % 05/04/2020 1.0* 0.0 - 0.5 % Final   • Neutrophils, Absolute 05/04/2020 4.81  1.70 - 7.00 10*3/mm3 Final   •  Lymphocytes, Absolute 05/04/2020 1.28  0.70 - 3.10 10*3/mm3 Final   • Monocytes, Absolute 05/04/2020 0.44  0.10 - 0.90 10*3/mm3 Final   • Eosinophils, Absolute 05/04/2020 0.14  0.00 - 0.40 10*3/mm3 Final   • Basophils, Absolute 05/04/2020 0.07  0.00 - 0.20 10*3/mm3 Final   • Immature Grans, Absolute 05/04/2020 0.07* 0.00 - 0.05 10*3/mm3 Final   • nRBC 05/04/2020 0.0  0.0 - 0.2 /100 WBC Final   • Glucose 05/04/2020 518* 70 - 130 mg/dL Final    Serial Number: [id number]:  [id number]   • Site 05/04/2020 OTHER   Final   • pH, Venous 05/04/2020 7.393  7.320 - 7.420 pH Units Final   • pCO2, Venous 05/04/2020 40.1  40.0 - 50.0 mm Hg Final    84 Value below reference range   • pO2, Venous 05/04/2020 92.1* 30.0 - 50.0 mm Hg Final    83 Value above reference range   • HCO3, Venous 05/04/2020 24.4  22.0 - 28.0 mmol/L Final   • Base Excess, Venous 05/04/2020 -0.5* 0.0 - 2.0 mmol/L Final    84 Value below reference range   • O2 Saturation, Venous 05/04/2020 95.7* 45.0 - 75.0 % Final    83 Value above reference range   • Barometric Pressure for Blood Gas 05/04/2020 734  mmHg Final   • Modality 05/04/2020 Room Air   Final   • FIO2 05/04/2020 21  % Final   • Ventilator Mode 05/04/2020 NA   Final    Meter: [id number]     :  [id number]   • Glucose 05/04/2020 351* 70 - 130 mg/dL Final    Serial Number: [id number]:  [id number]      No radiology results for the last 90 days.    Assessment / Plan      Assessment & Plan:  1. Elevated LFTs  Hepatocellular pattern of elevated liver enzymes.  I suspect he has a progressive Coon.  His last CT scan abdomen pelvis done revealed fatty liver disease without any liver lesions.   He is morbidly obese, he has metabolic syndrome with diabetes hypertension.  His last CORDELIA in 2017 was negative.  His acute hepatitis panel were negative in 2017.  We will get basic work-up for elevated liver enzyme  - CORDELIA; Future  - Anti-Smooth Muscle Antibody Titer; Future  - Ceruloplasmin; Future  -  Ferritin; Future  - Iron Profile; Future  - Hepatitis A Antibody, Total; Future  - Hepatitis B Surface Antibody; Future  - Protime-INR; Future  - MUSE Fibrosure; Future    2. Fatty liver  He has a nonalcoholic fatty liver disease and Muse supectated    3. Gastroesophageal reflux disease without esophagitis  Chronic history of reflux disease.  He is non-smoker  Heartburn symptoms well controlled with the low-dose PPI.  Last EGD 5 years ago done at [facility] showed only minimal gastric inflammation per patient  Reflux diet discussed and advised to continue the PPI for now    4. Morbidly obese (CMS/HCC)  His BMI is 44.  Most of his symptoms stem from his obesity  He seem to have a Muse now.  He has a significant joint issues and may not be a candidate for any regular exercise  Dietary change and low calorie intake discussed  If is a Muse fiber sure this showing any significant advanced liver disease we will discuss on bariatric surgery    5. Personal history of colonic polyps  Last colonoscopy was in 2015 by Dr. Miguel at [facility] as per patient polyps removed  He is due for colonoscopy now  The indications, technique, alternatives and potential risk and complications were discussed with the patient including but not limited to bleeding, bowel perforations, missing lesions and anesthetic complications. The patient understands and wishes to proceed with the procedure and has given their verbal consent. Written patient education information was given to the patient.   The patient will call if they have further questions before procedure.     - Case Request; Standing  - Implement Anesthesia Orders Day of Procedure; Standing  - Obtain Informed Consent; Standing  - Verify Bowel Prep Was Successful; Standing  - Oxygen Therapy- Nasal Cannula; 2 LPM; Titrate for SPO2: equal to or greater than, 90%; Standing  - sodium chloride 0.9 % infusion  - Case Request          Follow Up:   No follow-ups on file.    Petra  SCOTTY Crowell MD  [facility]  6/17/2020  14:24    Please note that portions of this note may have been completed with a voice recognition program. Efforts were made to edit the dictations, but occasionally words are mistranscribed.

## 2020-06-18 PROBLEM — Z86.010 PERSONAL HISTORY OF COLONIC POLYPS: Status: ACTIVE | Noted: 2020-06-18

## 2020-06-18 LAB
ACTIN IGG SERPL-ACNC: 3 UNITS (ref 0–19)
CERULOPLASMIN SERPL-MCNC: 25 MG/DL (ref 16–31)

## 2020-06-19 ENCOUNTER — TELEPHONE (OUTPATIENT)
Dept: GASTROENTEROLOGY | Facility: CLINIC | Age: 60
End: 2020-06-19

## 2020-06-19 LAB
ANA SER QL: NEGATIVE
HAV AB SER QL IA: NEGATIVE

## 2020-06-19 NOTE — TELEPHONE ENCOUNTER
PAT is requiring patient to have cardiac clearance prior to procedure. Have been unable to reach [facility]. I have left messages for return call. Patient is aware and we will reschedule his procedure once we obtain the clearance.

## 2020-06-20 LAB
A2 MACROGLOB SERPL-MCNC: 261 MG/DL (ref 110–276)
ALT SERPL W P-5'-P-CCNC: 104 IU/L (ref 0–55)
APO A-I SERPL-MCNC: 91 MG/DL (ref 101–178)
AST SERPL W P-5'-P-CCNC: 115 IU/L (ref 0–40)
BILIRUB SERPL-MCNC: 0.4 MG/DL (ref 0–1.2)
CHOLEST SERPL-MCNC: 104 MG/DL (ref 100–199)
FIBROSIS SCORING:: ABNORMAL
FIBROSIS STAGE SERPL QL: ABNORMAL
GGT SERPL-CCNC: 147 IU/L (ref 0–65)
GLUCOSE SERPL-MCNC: 215 MG/DL (ref 65–99)
HAPTOGLOB SERPL-MCNC: 191 MG/DL (ref 29–370)
INTERPRETATIONS: (REFERENCE): ABNORMAL
LABORATORY COMMENT REPORT: ABNORMAL
LIMITATIONS: (REFERENCE): ABNORMAL
LIVER FIBR SCORE SERPL CALC.FIBROSURE: 0.62 (ref 0–0.21)
NASH GRADE (REFERENCE): ABNORMAL
NASH SCORE (REFERENCE): 0.5
NASH SCORING (REFERENCE): ABNORMAL
STEATOSIS GRADE (REFERENCE): ABNORMAL
STEATOSIS GRADING (REFERENCE): ABNORMAL
STEATOSIS SCORE (REFERENCE): 0.91 (ref 0–0.3)
TRIGL SERPL-MCNC: 171 MG/DL (ref 0–149)
WEIGHT: (REFERENCE): 307 LBS

## 2020-06-23 ENCOUNTER — HOSPITAL ENCOUNTER (EMERGENCY)
Facility: HOSPITAL | Age: 60
Discharge: HOME | End: 2020-06-23
Attending: EMERGENCY MEDICINE | Admitting: EMERGENCY MEDICINE

## 2020-06-23 ENCOUNTER — APPOINTMENT (OUTPATIENT)
Dept: GENERAL RADIOLOGY | Facility: HOSPITAL | Age: 60
End: 2020-06-23

## 2020-06-23 ENCOUNTER — APPOINTMENT (OUTPATIENT)
Dept: CT IMAGING | Facility: HOSPITAL | Age: 60
End: 2020-06-23

## 2020-06-23 VITALS
WEIGHT: 300 LBS | DIASTOLIC BLOOD PRESSURE: 68 MMHG | BODY MASS INDEX: 42.95 KG/M2 | OXYGEN SATURATION: 96 % | HEART RATE: 82 BPM | HEIGHT: 70 IN | SYSTOLIC BLOOD PRESSURE: 122 MMHG | TEMPERATURE: 98.9 F | RESPIRATION RATE: 18 BRPM

## 2020-06-23 DIAGNOSIS — E86.0 DEHYDRATION: Primary | ICD-10-CM

## 2020-06-23 LAB
ALBUMIN SERPL-MCNC: 3.8 G/DL (ref 3.5–5.2)
ALBUMIN/GLOB SERPL: 1.4 G/DL
ALP SERPL-CCNC: 82 U/L (ref 39–117)
ALT SERPL W P-5'-P-CCNC: 74 U/L (ref 1–41)
ANION GAP SERPL CALCULATED.3IONS-SCNC: 11.3 MMOL/L (ref 5–15)
AST SERPL-CCNC: 84 U/L (ref 1–40)
BASOPHILS # BLD AUTO: 0.05 10*3/MM3 (ref 0–0.2)
BASOPHILS NFR BLD AUTO: 0.7 % (ref 0–1.5)
BILIRUB SERPL-MCNC: 0.8 MG/DL (ref 0.2–1.2)
BILIRUB UR QL STRIP: NEGATIVE
BUN BLD-MCNC: 22 MG/DL (ref 6–20)
BUN/CREAT SERPL: 15.4 (ref 7–25)
CALCIUM SPEC-SCNC: 9.4 MG/DL (ref 8.6–10.5)
CHLORIDE SERPL-SCNC: 94 MMOL/L (ref 98–107)
CLARITY UR: CLEAR
CO2 SERPL-SCNC: 29.7 MMOL/L (ref 22–29)
COLOR UR: YELLOW
CREAT BLD-MCNC: 1.43 MG/DL (ref 0.76–1.27)
DEPRECATED RDW RBC AUTO: 45.5 FL (ref 37–54)
EOSINOPHIL # BLD AUTO: 0.25 10*3/MM3 (ref 0–0.4)
EOSINOPHIL NFR BLD AUTO: 3.5 % (ref 0.3–6.2)
ERYTHROCYTE [DISTWIDTH] IN BLOOD BY AUTOMATED COUNT: 15.4 % (ref 12.3–15.4)
GFR SERPL CREATININE-BSD FRML MDRD: 51 ML/MIN/1.73
GLOBULIN UR ELPH-MCNC: 2.7 GM/DL
GLUCOSE BLD-MCNC: 292 MG/DL (ref 65–99)
GLUCOSE UR STRIP-MCNC: NEGATIVE MG/DL
HCT VFR BLD AUTO: 37.2 % (ref 37.5–51)
HGB BLD-MCNC: 12.4 G/DL (ref 13–17.7)
HGB UR QL STRIP.AUTO: NEGATIVE
HOLD SPECIMEN: NORMAL
HOLD SPECIMEN: NORMAL
IMM GRANULOCYTES # BLD AUTO: 0.04 10*3/MM3 (ref 0–0.05)
IMM GRANULOCYTES NFR BLD AUTO: 0.6 % (ref 0–0.5)
KETONES UR QL STRIP: NEGATIVE
LEUKOCYTE ESTERASE UR QL STRIP.AUTO: NEGATIVE
LYMPHOCYTES # BLD AUTO: 1.54 10*3/MM3 (ref 0.7–3.1)
LYMPHOCYTES NFR BLD AUTO: 21.6 % (ref 19.6–45.3)
MCH RBC QN AUTO: 27.1 PG (ref 26.6–33)
MCHC RBC AUTO-ENTMCNC: 33.3 G/DL (ref 31.5–35.7)
MCV RBC AUTO: 81.4 FL (ref 79–97)
MONOCYTES # BLD AUTO: 0.38 10*3/MM3 (ref 0.1–0.9)
MONOCYTES NFR BLD AUTO: 5.3 % (ref 5–12)
NEUTROPHILS # BLD AUTO: 4.88 10*3/MM3 (ref 1.7–7)
NEUTROPHILS NFR BLD AUTO: 68.3 % (ref 42.7–76)
NITRITE UR QL STRIP: NEGATIVE
NRBC BLD AUTO-RTO: 0 /100 WBC (ref 0–0.2)
NT-PROBNP SERPL-MCNC: 56.4 PG/ML (ref 5–900)
PH UR STRIP.AUTO: <=5 [PH] (ref 5–8)
PLATELET # BLD AUTO: 175 10*3/MM3 (ref 140–450)
PMV BLD AUTO: 10.4 FL (ref 6–12)
POTASSIUM BLD-SCNC: 4.5 MMOL/L (ref 3.5–5.2)
PROT SERPL-MCNC: 6.5 G/DL (ref 6–8.5)
PROT UR QL STRIP: NEGATIVE
RBC # BLD AUTO: 4.57 10*6/MM3 (ref 4.14–5.8)
SODIUM BLD-SCNC: 135 MMOL/L (ref 136–145)
SP GR UR STRIP: 1.01 (ref 1–1.03)
TROPONIN T SERPL-MCNC: 0.02 NG/ML (ref 0–0.03)
UROBILINOGEN UR QL STRIP: NORMAL
WBC NRBC COR # BLD: 7.14 10*3/MM3 (ref 3.4–10.8)
WHOLE BLOOD HOLD SPECIMEN: NORMAL
WHOLE BLOOD HOLD SPECIMEN: NORMAL

## 2020-06-23 PROCEDURE — 84484 ASSAY OF TROPONIN QUANT: CPT | Performed by: NURSE PRACTITIONER

## 2020-06-23 PROCEDURE — 93005 ELECTROCARDIOGRAM TRACING: CPT | Performed by: EMERGENCY MEDICINE

## 2020-06-23 PROCEDURE — 71046 X-RAY EXAM CHEST 2 VIEWS: CPT

## 2020-06-23 PROCEDURE — 83880 ASSAY OF NATRIURETIC PEPTIDE: CPT | Performed by: NURSE PRACTITIONER

## 2020-06-23 PROCEDURE — 85025 COMPLETE CBC W/AUTO DIFF WBC: CPT | Performed by: NURSE PRACTITIONER

## 2020-06-23 PROCEDURE — 99285 EMERGENCY DEPT VISIT HI MDM: CPT

## 2020-06-23 PROCEDURE — 70450 CT HEAD/BRAIN W/O DYE: CPT

## 2020-06-23 PROCEDURE — 80053 COMPREHEN METABOLIC PANEL: CPT | Performed by: NURSE PRACTITIONER

## 2020-06-23 PROCEDURE — 81003 URINALYSIS AUTO W/O SCOPE: CPT | Performed by: NURSE PRACTITIONER

## 2020-06-23 RX ORDER — SODIUM CHLORIDE 0.9 % (FLUSH) 0.9 %
10 SYRINGE (ML) INJECTION AS NEEDED
Status: DISCONTINUED | OUTPATIENT
Start: 2020-06-23 | End: 2020-06-23 | Stop reason: HOSPADM

## 2020-06-23 RX ADMIN — SODIUM CHLORIDE 1000 ML: 9 INJECTION, SOLUTION INTRAVENOUS at 19:08

## 2020-06-23 RX ADMIN — SODIUM CHLORIDE 1000 ML: 9 INJECTION, SOLUTION INTRAVENOUS at 20:19

## 2020-06-23 NOTE — ED PROVIDER NOTES
Subjective   History of Present Illness  This a 60-year-old gentleman who comes in today complaining of right arm and right leg numbness that occurred about 6 hours prior to arrival.  He states that he was standing in [employer] when he developed symptoms of numbness and felt as though he was going to pass out.  He went on into [employer] sit down and ate and beginning to feel a little better.  Once he finished eating he did check his glucose which was over 200.  He went on to go home and says he started feeling bad again and has felt bad intermittently since that time.  He reports the numbness kind of comes and goes and the weakness is continuous.  He reports in route to come to the hospital he started feeling some numbness to his left arm that has since resolved.  He denies any chest pain, shortness of breath, fever, chills, nausea or vomiting.  Review of Systems   HENT: Negative.    Eyes: Negative.    Respiratory: Negative.    Cardiovascular: Negative.    Gastrointestinal: Negative.    Genitourinary: Negative.    Skin: Negative.    Neurological: Positive for weakness and numbness.   Psychiatric/Behavioral: Negative.        Past Medical History:   Diagnosis Date   • Anxiety    • Arthritis    • Asthma    • Brachial neuritis 1/19/2017   • Cellulitis     left arm and right leg    • Chest pain    • CHF (congestive heart failure) (CMS/[facility])     Patient reported history May 2020    • COPD (chronic obstructive pulmonary disease) (CMS/[facility])    • Coronary artery disease     Patient reported CABG , 3 vessel   • Depression    • Diabetes mellitus (CMS/[facility])     diagnosed in 1998, checks fsbg 3-4x/day   • Dizziness    • Edema    • Elevated cholesterol    • GERD (gastroesophageal reflux disease)    • H/O chest x-ray 07/04/2015    Mild Left base atelectasis   • H/O echocardiogram 07/05/2015    Normal LVSF. EF of 60-65%. Grade 1 diastolic dysfunction of the LV myocardium. No evidence of pericardial effusion   • H/O  exercise stress test    • Hearing loss     No use of hearing aids   • History of fracture     Reported 2 bones in left foot and a finger   • History of herniated intervertebral disc     History of left L5-S1 disc herniation   • History of pneumonia    • History of pneumonia    • Hypertension    • LOM (loss of memory) 1/19/2017   • Lower back pain     Right   • Measles    • Neuropathy     feet    • EDD treated with BiPAP     BiPAP HS - instructed to bring mask/machine DOS (setting 13 and 5)   • Palpitations    • Pericardial effusion    • Poor dentition     Patient reported missing multiple teeth   • Renal disorder    • Seizure disorder (CMS/HCC)    • Seizures (CMS/HCC)     FOCAL last 2009   • Shortness of breath 1/19/2017   • SOB (shortness of breath)    • Staph infection     back after sugery at the incision site    • Stroke (CMS/HCC)     x2 most recent , slight weakness in hands occasionaly    • Wears glasses        Allergies   Allergen Reactions   • Sulfa Antibiotics Anaphylaxis, Nausea And Vomiting and Delirium   • Invokana [Canagliflozin] Unknown - Low Severity     DKA   • Codeine Nausea And Vomiting     Can only take with Phenergan   • Morphine Unknown - Low Severity     Does not work. It causes pain       Past Surgical History:   Procedure Laterality Date   • BACK SURGERY     • CARDIAC CATHETERIZATION     • CARDIAC CATHETERIZATION N/A 8/11/2017    Procedure: Coronary angiography;  Surgeon: Dallas Kim MD;  Location: [facility] CATH INVASIVE LOCATION;  Service:    • CARDIAC CATHETERIZATION N/A 8/11/2017    Procedure: Left Heart Cath;  Surgeon: Dallas Kim MD;  Location: [facility] CATH INVASIVE LOCATION;  Service:    • CARDIAC CATHETERIZATION N/A 8/11/2017    Procedure: Left ventriculography;  Surgeon: Dallas Kim MD;  Location: [facility] CATH INVASIVE LOCATION;  Service:    • CARDIAC CATHETERIZATION      2002 x1 stent,  x1 stent   • CARDIOVASCULAR STRESS TEST  07/03/2017    WITH   BREEDING AT [facility]   • CARPAL TUNNEL RELEASE Right    • CATARACT EXTRACTION W/ INTRAOCULAR LENS IMPLANT Right 6/12/2017    Procedure: CATARACT PHACO EXTRACTION WITH INTRAOCULAR LENS IMPLANT RIGHT ;  Surgeon: Natalie Cao MD;  Location: [facility] OR;  Service:    • CATARACT EXTRACTION W/ INTRAOCULAR LENS IMPLANT Left 7/10/2017    Procedure: CATARACT PHACO EXTRACTION WITH INTRAOCULAR LENS IMPLANT LEFT;  Surgeon: Natalie Cao MD;  Location: [facility] OR;  Service:    • CHOLECYSTECTOMY     • COLONOSCOPY     • CORONARY ANGIOPLASTY WITH STENT PLACEMENT      X1-LAD   • CORONARY ARTERY BYPASS GRAFT N/A 8/15/2017    Procedure: CORONARY ARTERY BYPASS GRAFTING x 3 UTILIZING THE LEFT INTERNAL MAMMARY ARTERY WITH ENDOSCOPIC VEIN HARVESTING OF THE RIGHT GREATER SAPHENOUS VEIN, HAMLET, LAD ENDARECTOMY;  Surgeon: [place] Damon MD;  Location:  [facility] OR;  Service:    • ENDOSCOPY     • EYE CAPSULOTOMY WITH LASER Right 11/25/2019    Procedure: EYE CAPSULOTOMY WITH LASER RIGHT;  Surgeon: Natalie Cao MD;  Location: [facility] OR;  Service: Ophthalmology   • EYE CAPSULOTOMY WITH LASER Left 12/9/2019    Procedure: EYE CAPSULOTOMY WITH LASER LEFT;  Surgeon: Natalie Cao MD;  Location: [facility] OR;  Service: Ophthalmology   • EYE SURGERY      cataract surgery both eyes   • INTERVENTIONAL RADIOLOGY PROCEDURE Bilateral 12/31/2019    Procedure: Carotid Cerebral Angiogram;  Surgeon: Damian Dubois MD;  Location: [facility] CATH INVASIVE LOCATION;  Service: Interventional Radiology   • KNEE ARTHROSCOPY Bilateral    • KNEE ARTHROSCOPY Right 2010    Dr Lewis   • KNEE ARTHROSCOPY Left 2008    Dr Jameson   • MOUTH SURGERY      Oral extractions   • NEUROPLASTY / TRANSPOSITION ULNAR NERVE AT ELBOW Left    • OTHER SURGICAL HISTORY      Foraminotomy and discectomy   • PERICARDIAL WINDOW N/A 8/25/2017    Procedure: PERICARDIAL WINDOW;  Surgeon: Freeman Phillips MD;  Location:  [facility] OR;  Service:        Family History   Problem Relation Age of Onset   • Hypertension  Mother    • Arthritis Mother    • Alcohol abuse Father    • Heart disease Other    • Stroke Other    • Hypertension Other    • Other Other         Neurologic disorder   • Parkinsonism Other    • Stroke Other    • Heart disease Other    • Hypertension Other    • Heart disease Other    • Stroke Other    • Hypertension Other        Social History     Socioeconomic History   • Marital status:      Spouse name: Not on file   • Number of children: 1   • Years of education: Not on file   • Highest education level: Not on file   Occupational History   • Occupation: [employer]     Employer: DISABLED     Comment: Disabled since -Back Problems   Tobacco Use   • Smoking status: Former Smoker     Packs/day: 1.00     Years: 10.00     Pack years: 10.00     Types: Cigarettes     Last attempt to quit: 1998     Years since quittin.4   • Smokeless tobacco: Former User     Types: Snuff     Quit date:    Substance and Sexual Activity   • Alcohol use: Yes     Frequency: Monthly or less     Drinks per session: 3 or 4     Comment: 3 PER YEAR   • Drug use: No   • Sexual activity: Defer   Social History Narrative    Caffeine use: 0-1 serving daily.    Patient lives at home with wife.            Objective   Physical Exam   Constitutional: He appears well-developed and well-nourished.   Nursing note and vitals reviewed.  GEN: No acute distress  Head: Normocephalic, atraumatic  Eyes: Pupils equal round reactive to light  ENT: Posterior pharynx normal in appearance, oral mucosa is moist  Chest: Nontender to palpation  Cardiovascular: Regular rate  Lungs: Clear to auscultation bilaterally  Abdomen: Soft, nontender, nondistended, no peritoneal signs  Extremities: No edema, normal appearance  Neuro: GCS 15  Psych: Mood and affect are appropriate  NIH 0    Procedures           ED Course  ED Course as of  130   Tue 2020   1933 Patient orthostatic will give which would explain the weakness and  numbness when he would stand.     [TW]   2007 EKG interpreted by me reveals sinus rhythm rate of 86.  No ectopy no ischemic changes normal EKG.    [PF]   2138 Patient feeling much better and his symptoms of all resolved.  Also, his blood pressure is normalized after 2 boluses.  I have advised the patient to follow-up with his primary care provider in the next 24 to 48 hours.  I have also advised him to hold his diuretic for the next 24 hours and see his primary prior to starting it back.  He is agreeable to this plan of care.    [TW]      ED Course User Index  [PF] Osito Ragland, DO  [TW] Anjali Dubose, APRN                                           MDM  Number of Diagnoses or Management Options     Amount and/or Complexity of Data Reviewed  Clinical lab tests: ordered and reviewed  Tests in the radiology section of CPT®: ordered and reviewed  Review and summarize past medical records: yes  Discuss the patient with other providers: yes  Independent visualization of images, tracings, or specimens: yes    Risk of Complications, Morbidity, and/or Mortality  Presenting problems: moderate  Diagnostic procedures: moderate  Management options: moderate        Final diagnoses:   Dehydration            Anjali Dubose, ANTONIO  06/24/20 2414

## 2020-06-29 ENCOUNTER — OFFICE VISIT (OUTPATIENT)
Dept: INTERNAL MEDICINE | Facility: CLINIC | Age: 60
End: 2020-06-29

## 2020-06-29 ENCOUNTER — LAB (OUTPATIENT)
Dept: LAB | Facility: HOSPITAL | Age: 60
End: 2020-06-29

## 2020-06-29 VITALS
BODY MASS INDEX: 43.09 KG/M2 | OXYGEN SATURATION: 98 % | TEMPERATURE: 97.3 F | HEIGHT: 70 IN | RESPIRATION RATE: 16 BRPM | SYSTOLIC BLOOD PRESSURE: 109 MMHG | DIASTOLIC BLOOD PRESSURE: 68 MMHG | HEART RATE: 85 BPM | WEIGHT: 301 LBS

## 2020-06-29 DIAGNOSIS — E11.65 UNCONTROLLED TYPE 2 DIABETES MELLITUS WITH HYPERGLYCEMIA (HCC): ICD-10-CM

## 2020-06-29 DIAGNOSIS — E78.2 MIXED HYPERLIPIDEMIA: ICD-10-CM

## 2020-06-29 DIAGNOSIS — D48.9 NEOPLASM OF UNCERTAIN BEHAVIOR: ICD-10-CM

## 2020-06-29 DIAGNOSIS — I10 BENIGN ESSENTIAL HYPERTENSION: Primary | ICD-10-CM

## 2020-06-29 PROCEDURE — 99214 OFFICE O/P EST MOD 30 MIN: CPT | Performed by: INTERNAL MEDICINE

## 2020-06-29 PROCEDURE — U0002 COVID-19 LAB TEST NON-CDC: HCPCS

## 2020-06-29 PROCEDURE — C9803 HOPD COVID-19 SPEC COLLECT: HCPCS

## 2020-06-29 PROCEDURE — U0004 COV-19 TEST NON-CDC HGH THRU: HCPCS

## 2020-06-30 LAB
REF LAB TEST METHOD: NORMAL
SARS-COV-2 RNA RESP QL NAA+PROBE: NOT DETECTED

## 2020-07-02 ENCOUNTER — ANESTHESIA EVENT (OUTPATIENT)
Dept: GASTROENTEROLOGY | Facility: HOSPITAL | Age: 60
End: 2020-07-02

## 2020-07-02 ENCOUNTER — ANESTHESIA (OUTPATIENT)
Dept: GASTROENTEROLOGY | Facility: HOSPITAL | Age: 60
End: 2020-07-02

## 2020-07-02 ENCOUNTER — HOSPITAL ENCOUNTER (OUTPATIENT)
Facility: HOSPITAL | Age: 60
Setting detail: HOSPITAL OUTPATIENT SURGERY
Discharge: HOME | End: 2020-07-02
Attending: INTERNAL MEDICINE | Admitting: INTERNAL MEDICINE

## 2020-07-02 VITALS
OXYGEN SATURATION: 100 % | TEMPERATURE: 98.6 F | BODY MASS INDEX: 43.81 KG/M2 | SYSTOLIC BLOOD PRESSURE: 108 MMHG | DIASTOLIC BLOOD PRESSURE: 57 MMHG | HEIGHT: 70 IN | WEIGHT: 306 LBS | RESPIRATION RATE: 16 BRPM | HEART RATE: 88 BPM

## 2020-07-02 DIAGNOSIS — Z86.010 PERSONAL HISTORY OF COLONIC POLYPS: ICD-10-CM

## 2020-07-02 LAB — GLUCOSE BLDC GLUCOMTR-MCNC: 148 MG/DL (ref 70–130)

## 2020-07-02 PROCEDURE — 25010000002 PROPOFOL 1000 MG/100ML EMULSION: Performed by: NURSE ANESTHETIST, CERTIFIED REGISTERED

## 2020-07-02 PROCEDURE — 82962 GLUCOSE BLOOD TEST: CPT

## 2020-07-02 PROCEDURE — 88305 TISSUE EXAM BY PATHOLOGIST: CPT | Performed by: INTERNAL MEDICINE

## 2020-07-02 PROCEDURE — 25010000002 FENTANYL CITRATE (PF) 100 MCG/2ML SOLUTION: Performed by: NURSE ANESTHETIST, CERTIFIED REGISTERED

## 2020-07-02 RX ORDER — MAGNESIUM HYDROXIDE 1200 MG/15ML
LIQUID ORAL AS NEEDED
Status: DISCONTINUED | OUTPATIENT
Start: 2020-07-02 | End: 2020-07-02 | Stop reason: HOSPADM

## 2020-07-02 RX ORDER — LIDOCAINE HYDROCHLORIDE 20 MG/ML
INJECTION, SOLUTION INTRAVENOUS AS NEEDED
Status: DISCONTINUED | OUTPATIENT
Start: 2020-07-02 | End: 2020-07-02 | Stop reason: SURG

## 2020-07-02 RX ORDER — SODIUM CHLORIDE 9 MG/ML
70 INJECTION, SOLUTION INTRAVENOUS CONTINUOUS PRN
Status: DISCONTINUED | OUTPATIENT
Start: 2020-07-02 | End: 2020-07-02 | Stop reason: HOSPADM

## 2020-07-02 RX ORDER — PROPOFOL 10 MG/ML
INJECTION, EMULSION INTRAVENOUS AS NEEDED
Status: DISCONTINUED | OUTPATIENT
Start: 2020-07-02 | End: 2020-07-02 | Stop reason: SURG

## 2020-07-02 RX ORDER — FENTANYL CITRATE 50 UG/ML
INJECTION, SOLUTION INTRAMUSCULAR; INTRAVENOUS AS NEEDED
Status: DISCONTINUED | OUTPATIENT
Start: 2020-07-02 | End: 2020-07-02 | Stop reason: SURG

## 2020-07-02 RX ADMIN — SODIUM CHLORIDE 70 ML/HR: 9 INJECTION, SOLUTION INTRAVENOUS at 10:55

## 2020-07-02 RX ADMIN — PROPOFOL 50 MG: 10 INJECTION, EMULSION INTRAVENOUS at 13:05

## 2020-07-02 RX ADMIN — PROPOFOL 50 MG: 10 INJECTION, EMULSION INTRAVENOUS at 13:12

## 2020-07-02 RX ADMIN — LIDOCAINE HYDROCHLORIDE 100 MG: 20 INJECTION, SOLUTION INTRAVENOUS at 12:54

## 2020-07-02 RX ADMIN — FENTANYL CITRATE 100 MCG: 50 INJECTION, SOLUTION INTRAMUSCULAR; INTRAVENOUS at 12:54

## 2020-07-02 RX ADMIN — PROPOFOL 50 MG: 10 INJECTION, EMULSION INTRAVENOUS at 12:54

## 2020-07-02 RX ADMIN — PROPOFOL 50 MG: 10 INJECTION, EMULSION INTRAVENOUS at 12:59

## 2020-07-02 RX ADMIN — PROPOFOL 50 MG: 10 INJECTION, EMULSION INTRAVENOUS at 13:20

## 2020-07-02 NOTE — ANESTHESIA PREPROCEDURE EVALUATION
Anesthesia Evaluation     Patient summary reviewed and Nursing notes reviewed   no history of anesthetic complications:  NPO Solid Status: > 8 hours  NPO Liquid Status: > 8 hours           Airway   Mallampati: I  TM distance: <3 FB  Neck ROM: full  no difficulty expected and Possible difficult intubation  Dental - normal exam     Pulmonary    (+) pneumonia resolved , COPD, asthma,shortness of breath, sleep apnea, decreased breath sounds,   Cardiovascular     Rhythm: regular    (+) hypertension, CAD, CABG, cardiac stents within the past 12 months CHF , pericardial effusion, hyperlipidemia,       Neuro/Psych  (+) seizures, CVA, headaches, dizziness/light headedness, numbness, psychiatric history Anxiety and Depression,     GI/Hepatic/Renal/Endo    (+) obesity, morbid obesity, GERD,  renal disease, diabetes mellitus type 2 poorly controlled using insulin,     Musculoskeletal     (+) arthralgias, back pain, chronic pain, myalgias,   Abdominal   (+) obese,     Bowel sounds: normal.   Substance History - negative use     OB/GYN negative ob/gyn ROS         Other   arthritis,      ROS/Med Hx Other: Cleared for procedure by [facility] cardiology, Herrick as moderate risk  fbs 148    Echo 2019 Mild LVH with normal LV systolic function ( EF is over 55%)  2) Mild left atrial enlargement   3) Trace MR and TR   4) Normal RV function   ekg sr nstw                    Anesthesia Plan    ASA 4     MAC   (Risks and benefits discussed including risk of aspiration, recall and dental damage. All patient questions answered.    Will continue with plan of care.ansley)  intravenous induction     Anesthetic plan, all risks, benefits, and alternatives have been provided, discussed and informed consent has been obtained with: patient.    Plan discussed with CRNA.

## 2020-07-02 NOTE — ANESTHESIA POSTPROCEDURE EVALUATION
Patient: Denzel Harding    Procedure Summary     Date:  07/02/20 Room / Location:  [facility] ENDOSCOPY 2 / [facility] ENDOSCOPY    Anesthesia Start:  1255 Anesthesia Stop:      Procedure:  COLONOSCOPY (N/A Anus) Diagnosis:       Personal history of colonic polyps      (Personal history of colonic polyps [Z86.010])    Surgeon:  Petra Crowell MD Provider:  Cam Ron CRNA    Anesthesia Type:  MAC ASA Status:  4          Anesthesia Type: MAC    Vitals  Vitals Value Taken Time   BP 90/54 7/2/2020  1:38 PM   Temp 98.6 °F (37 °C) 7/2/2020  1:38 PM   Pulse 86 7/2/2020  1:38 PM   Resp 16 7/2/2020  1:38 PM   SpO2 95 % 7/2/2020  1:38 PM           Post Anesthesia Care and Evaluation    Patient location during evaluation: bedside  Patient participation: complete - patient participated  Level of consciousness: awake  Pain score: 0  Pain management: adequate  Airway patency: patent  Anesthetic complications: No anesthetic complications  PONV Status: controlled  Cardiovascular status: acceptable and stable  Respiratory status: acceptable and room air  Hydration status: acceptable

## 2020-07-02 NOTE — DISCHARGE INSTRUCTIONS
Please follow all post op instructions and follow up appointment time from your physician's office included in your discharge packet.    Rest today    No pushing,pulling,tugging,heavy lifting, or strenuous activity   No major decision making,driving,or drinking alcoholic beverages for 24 hours due to the sedation you received  Always use good hand hygiene/washing technique  No driving on pain medication.    To assist you in voiding:  Drink plenty of fluids  Listen to running water while attempting to void.    If you are unable to urinate and you have an uncomfortable urge to void or it has been   6 hours since you were discharged, return to the Emergency Room.        Need repeat colon 2-3 years  OK to start ASA/plavix after 24 hours  High fiber diet  F/u my office 2-4 weeks

## 2020-07-02 NOTE — NURSING NOTE
1410 RN had SYDNI Nevarez CRNA evaluate patient due to low bp. Patient has had a total of 1000mls IVF. Patient wears c-pap at home.  SYDNI Nevarez CRNA states that patient can go home but instructed him to drink po fluid and wear c-pap today.  Patient denies syncope or s/s of hypotension. Patient v/u of instructions.

## 2020-07-08 ENCOUNTER — HOSPITAL ENCOUNTER (EMERGENCY)
Facility: HOSPITAL | Age: 60
Discharge: HOME | End: 2020-07-08
Attending: EMERGENCY MEDICINE | Admitting: EMERGENCY MEDICINE

## 2020-07-08 ENCOUNTER — APPOINTMENT (OUTPATIENT)
Dept: GENERAL RADIOLOGY | Facility: HOSPITAL | Age: 60
End: 2020-07-08

## 2020-07-08 VITALS
OXYGEN SATURATION: 97 % | HEIGHT: 70 IN | DIASTOLIC BLOOD PRESSURE: 81 MMHG | SYSTOLIC BLOOD PRESSURE: 123 MMHG | HEART RATE: 70 BPM | BODY MASS INDEX: 42.95 KG/M2 | RESPIRATION RATE: 16 BRPM | WEIGHT: 300 LBS | TEMPERATURE: 98.2 F

## 2020-07-08 DIAGNOSIS — E86.0 DEHYDRATION: Primary | ICD-10-CM

## 2020-07-08 LAB
ALBUMIN SERPL-MCNC: 3.7 G/DL (ref 3.5–5.2)
ALBUMIN/GLOB SERPL: 1.4 G/DL
ALP SERPL-CCNC: 89 U/L (ref 39–117)
ALT SERPL W P-5'-P-CCNC: 71 U/L (ref 1–41)
ANION GAP SERPL CALCULATED.3IONS-SCNC: 10.8 MMOL/L (ref 5–15)
AST SERPL-CCNC: 61 U/L (ref 1–40)
BACTERIA UR QL AUTO: ABNORMAL /HPF
BASOPHILS # BLD AUTO: 0.07 10*3/MM3 (ref 0–0.2)
BASOPHILS NFR BLD AUTO: 1.1 % (ref 0–1.5)
BILIRUB SERPL-MCNC: 0.8 MG/DL (ref 0–1.2)
BILIRUB UR QL STRIP: NEGATIVE
BUN SERPL-MCNC: 16 MG/DL (ref 6–20)
BUN/CREAT SERPL: 10.9 (ref 7–25)
CALCIUM SPEC-SCNC: 9.5 MG/DL (ref 8.6–10.5)
CHLORIDE SERPL-SCNC: 101 MMOL/L (ref 98–107)
CLARITY UR: CLEAR
CO2 SERPL-SCNC: 23.2 MMOL/L (ref 22–29)
COLOR UR: ABNORMAL
CREAT SERPL-MCNC: 1.47 MG/DL (ref 0.76–1.27)
D-LACTATE SERPL-SCNC: 1.9 MMOL/L (ref 0.5–2)
DEPRECATED RDW RBC AUTO: 44.7 FL (ref 37–54)
EOSINOPHIL # BLD AUTO: 0.15 10*3/MM3 (ref 0–0.4)
EOSINOPHIL NFR BLD AUTO: 2.4 % (ref 0.3–6.2)
ERYTHROCYTE [DISTWIDTH] IN BLOOD BY AUTOMATED COUNT: 15.1 % (ref 12.3–15.4)
GFR SERPL CREATININE-BSD FRML MDRD: 49 ML/MIN/1.73
GLOBULIN UR ELPH-MCNC: 2.6 GM/DL
GLUCOSE SERPL-MCNC: 215 MG/DL (ref 65–99)
GLUCOSE UR STRIP-MCNC: ABNORMAL MG/DL
HCT VFR BLD AUTO: 35.6 % (ref 37.5–51)
HGB BLD-MCNC: 11.8 G/DL (ref 13–17.7)
HGB UR QL STRIP.AUTO: NEGATIVE
HOLD SPECIMEN: NORMAL
HOLD SPECIMEN: NORMAL
HYALINE CASTS UR QL AUTO: ABNORMAL /LPF
IMM GRANULOCYTES # BLD AUTO: 0.14 10*3/MM3 (ref 0–0.05)
IMM GRANULOCYTES NFR BLD AUTO: 2.2 % (ref 0–0.5)
KETONES UR QL STRIP: NEGATIVE
LEUKOCYTE ESTERASE UR QL STRIP.AUTO: ABNORMAL
LYMPHOCYTES # BLD AUTO: 0.92 10*3/MM3 (ref 0.7–3.1)
LYMPHOCYTES NFR BLD AUTO: 14.8 % (ref 19.6–45.3)
MCH RBC QN AUTO: 26.8 PG (ref 26.6–33)
MCHC RBC AUTO-ENTMCNC: 33.1 G/DL (ref 31.5–35.7)
MCV RBC AUTO: 80.9 FL (ref 79–97)
MONOCYTES # BLD AUTO: 0.53 10*3/MM3 (ref 0.1–0.9)
MONOCYTES NFR BLD AUTO: 8.5 % (ref 5–12)
NEUTROPHILS NFR BLD AUTO: 4.42 10*3/MM3 (ref 1.7–7)
NEUTROPHILS NFR BLD AUTO: 71 % (ref 42.7–76)
NITRITE UR QL STRIP: NEGATIVE
NRBC BLD AUTO-RTO: 0 /100 WBC (ref 0–0.2)
PH UR STRIP.AUTO: <=5 [PH] (ref 5–8)
PLATELET # BLD AUTO: 179 10*3/MM3 (ref 140–450)
PMV BLD AUTO: 9.9 FL (ref 6–12)
POTASSIUM SERPL-SCNC: 4.9 MMOL/L (ref 3.5–5.2)
PROT SERPL-MCNC: 6.3 G/DL (ref 6–8.5)
PROT UR QL STRIP: NEGATIVE
RBC # BLD AUTO: 4.4 10*6/MM3 (ref 4.14–5.8)
RBC # UR: ABNORMAL /HPF
REF LAB TEST METHOD: ABNORMAL
SARS-COV-2 RNA PNL SPEC NAA+PROBE: NOT DETECTED
SODIUM SERPL-SCNC: 135 MMOL/L (ref 136–145)
SP GR UR STRIP: 1.02 (ref 1–1.03)
SQUAMOUS #/AREA URNS HPF: ABNORMAL /HPF
TROPONIN T SERPL-MCNC: 0.03 NG/ML (ref 0–0.03)
UROBILINOGEN UR QL STRIP: ABNORMAL
WBC # BLD AUTO: 6.23 10*3/MM3 (ref 3.4–10.8)
WBC UR QL AUTO: ABNORMAL /HPF
WHOLE BLOOD HOLD SPECIMEN: NORMAL
WHOLE BLOOD HOLD SPECIMEN: NORMAL

## 2020-07-08 PROCEDURE — 81001 URINALYSIS AUTO W/SCOPE: CPT | Performed by: EMERGENCY MEDICINE

## 2020-07-08 PROCEDURE — 84484 ASSAY OF TROPONIN QUANT: CPT | Performed by: EMERGENCY MEDICINE

## 2020-07-08 PROCEDURE — 87635 SARS-COV-2 COVID-19 AMP PRB: CPT | Performed by: EMERGENCY MEDICINE

## 2020-07-08 PROCEDURE — 85025 COMPLETE CBC W/AUTO DIFF WBC: CPT | Performed by: EMERGENCY MEDICINE

## 2020-07-08 PROCEDURE — 80053 COMPREHEN METABOLIC PANEL: CPT | Performed by: EMERGENCY MEDICINE

## 2020-07-08 PROCEDURE — 71045 X-RAY EXAM CHEST 1 VIEW: CPT

## 2020-07-08 PROCEDURE — 96360 HYDRATION IV INFUSION INIT: CPT

## 2020-07-08 PROCEDURE — 83605 ASSAY OF LACTIC ACID: CPT | Performed by: EMERGENCY MEDICINE

## 2020-07-08 PROCEDURE — 99284 EMERGENCY DEPT VISIT MOD MDM: CPT

## 2020-07-08 PROCEDURE — 93005 ELECTROCARDIOGRAM TRACING: CPT

## 2020-07-08 RX ORDER — SODIUM CHLORIDE 0.9 % (FLUSH) 0.9 %
10 SYRINGE (ML) INJECTION AS NEEDED
Status: DISCONTINUED | OUTPATIENT
Start: 2020-07-08 | End: 2020-07-08 | Stop reason: HOSPADM

## 2020-07-08 RX ORDER — ONDANSETRON 2 MG/ML
4 INJECTION INTRAMUSCULAR; INTRAVENOUS ONCE
Status: DISCONTINUED | OUTPATIENT
Start: 2020-07-08 | End: 2020-07-08

## 2020-07-08 RX ADMIN — SODIUM CHLORIDE 1000 ML: 9 INJECTION, SOLUTION INTRAVENOUS at 14:44

## 2020-07-08 NOTE — ED PROVIDER NOTES
Subjective   History of Present Illness    Chief Complaint: Generalized weakness hypertension nausea  History of Present Illness: 60-year-old male presents with above complaint since 11 this morning.  No chest pain no shortness of breath no palpitations.  History of CHF and hypertension.  States that he was seen 2 weeks ago for similar and had IV fluids at that time.  Patient states his family doctor discontinued 1 of his blood pressure medication  Onset: This morning  Duration: Persist  Exacerbating / Alleviating factors: None  Associated symptoms: None      Nurses Notes reviewed and agree, including vitals, allergies, social history and prior medical history.     REVIEW OF SYSTEMS: All systems reviewed and not pertinent unless noted.    Positive for: Generalized weakness hypertension nausea    Negative for: Chest pain palpitations cough sick contacts travel  Review of Systems    Past Medical History:   Diagnosis Date   • Anxiety    • Arthritis    • Asthma    • Brachial neuritis 1/19/2017   • Cellulitis     left arm and right leg    • Chest pain    • CHF (congestive heart failure) (CMS/[facility])     Patient reported history May 2020    • COPD (chronic obstructive pulmonary disease) (CMS/[facility])    • Coronary artery disease     Patient reported CABG , 3 vessel   • Depression    • Diabetes mellitus (CMS/[facility])     diagnosed in 1998, checks fsbg 3-4x/day   • Dizziness    • Edema    • Elevated cholesterol    • GERD (gastroesophageal reflux disease)    • H/O chest x-ray 07/04/2015    Mild Left base atelectasis   • H/O echocardiogram 07/05/2015    Normal LVSF. EF of 60-65%. Grade 1 diastolic dysfunction of the LV myocardium. No evidence of pericardial effusion   • H/O exercise stress test    • Hearing loss     No use of hearing aids   • History of fracture     Reported 2 bones in left foot and a finger   • History of herniated intervertebral disc     History of left L5-S1 disc herniation   • History of pneumonia    •  History of pneumonia    • Hypertension    • LOM (loss of memory) 1/19/2017   • Lower back pain     Right   • Measles    • Neuropathy     feet    • EDD treated with BiPAP     BiPAP HS - instructed to bring mask/machine DOS (setting 13 and 5)   • Palpitations    • Pericardial effusion    • Poor dentition     Patient reported missing multiple teeth   • Renal disorder    • Seizure disorder (CMS/HCC)    • Seizures (CMS/HCC)     FOCAL last 2009   • Shortness of breath 1/19/2017   • SOB (shortness of breath)    • Staph infection     back after sugery at the incision site    • Stroke (CMS/HCC)     x2 most recent , slight weakness in hands occasionaly    • Wears glasses        Allergies   Allergen Reactions   • Sulfa Antibiotics Anaphylaxis, Nausea And Vomiting and Delirium   • Invokana [Canagliflozin] Unknown - Low Severity     DKA   • Codeine Nausea And Vomiting     Can only take with Phenergan   • Morphine Unknown - Low Severity     Does not work. It causes pain       Past Surgical History:   Procedure Laterality Date   • BACK SURGERY     • CARDIAC CATHETERIZATION     • CARDIAC CATHETERIZATION N/A 8/11/2017    Procedure: Coronary angiography;  Surgeon: Dallas Kim MD;  Location: [facility] CATH INVASIVE LOCATION;  Service:    • CARDIAC CATHETERIZATION N/A 8/11/2017    Procedure: Left Heart Cath;  Surgeon: Dallas Kim MD;  Location: [facility] CATH INVASIVE LOCATION;  Service:    • CARDIAC CATHETERIZATION N/A 8/11/2017    Procedure: Left ventriculography;  Surgeon: Dallas Kim MD;  Location: [facility] INVASIVE LOCATION;  Service:    • CARDIAC CATHETERIZATION      2002 x1 stent,  x1 stent   • CARDIOVASCULAR STRESS TEST  07/03/2017    WITH DR KIM AT [facility]   • CARPAL TUNNEL RELEASE Right    • CATARACT EXTRACTION W/ INTRAOCULAR LENS IMPLANT Right 6/12/2017    Procedure: CATARACT PHACO EXTRACTION WITH INTRAOCULAR LENS IMPLANT RIGHT ;  Surgeon: Natalie Cao MD;  Location: [facility];  Service:    •  CATARACT EXTRACTION W/ INTRAOCULAR LENS IMPLANT Left 7/10/2017    Procedure: CATARACT PHACO EXTRACTION WITH INTRAOCULAR LENS IMPLANT LEFT;  Surgeon: Natalie Cao MD;  Location: [facility] OR;  Service:    • CHOLECYSTECTOMY     • COLONOSCOPY     • COLONOSCOPY N/A 7/2/2020    Procedure: COLONOSCOPY;  Surgeon: Petra Crowell MD;  Location: [facility] ENDOSCOPY;  Service: Gastroenterology;  Laterality: N/A;  poor prep   • CORONARY ANGIOPLASTY WITH STENT PLACEMENT      X1-LAD   • CORONARY ARTERY BYPASS GRAFT N/A 8/15/2017    Procedure: CORONARY ARTERY BYPASS GRAFTING x 3 UTILIZING THE LEFT INTERNAL MAMMARY ARTERY WITH ENDOSCOPIC VEIN HARVESTING OF THE RIGHT GREATER SAPHENOUS VEIN, HAMLET, LAD ENDARECTOMY;  Surgeon: London Damon MD;  Location:  [facility] OR;  Service:    • ENDOSCOPY     • EYE CAPSULOTOMY WITH LASER Right 11/25/2019    Procedure: EYE CAPSULOTOMY WITH LASER RIGHT;  Surgeon: Natalie Cao MD;  Location: [facility] OR;  Service: Ophthalmology   • EYE CAPSULOTOMY WITH LASER Left 12/9/2019    Procedure: EYE CAPSULOTOMY WITH LASER LEFT;  Surgeon: Natalie Cao MD;  Location: [facility] OR;  Service: Ophthalmology   • EYE SURGERY      cataract surgery both eyes   • INTERVENTIONAL RADIOLOGY PROCEDURE Bilateral 12/31/2019    Procedure: Carotid Cerebral Angiogram;  Surgeon: Damian Dubois MD;  Location: [facility] CATH INVASIVE LOCATION;  Service: Interventional Radiology   • KNEE ARTHROSCOPY Bilateral    • KNEE ARTHROSCOPY Right 2010    Dr Lewis   • KNEE ARTHROSCOPY Left 2008    Dr Jameson   • MOUTH SURGERY      Oral extractions   • NEUROPLASTY / TRANSPOSITION ULNAR NERVE AT ELBOW Left    • OTHER SURGICAL HISTORY      Foraminotomy and discectomy   • PERICARDIAL WINDOW N/A 8/25/2017    Procedure: PERICARDIAL WINDOW;  Surgeon: Freeman Phillips MD;  Location:  [facility] OR;  Service:        Family History   Problem Relation Age of Onset   • Hypertension Mother    • Arthritis Mother    • Alcohol abuse Father    • Heart disease Other     • Stroke Other    • Hypertension Other    • Other Other         Neurologic disorder   • Parkinsonism Other    • Stroke Other    • Heart disease Other    • Hypertension Other    • Heart disease Other    • Stroke Other    • Hypertension Other        Social History     Socioeconomic History   • Marital status:      Spouse name: Not on file   • Number of children: 1   • Years of education: Not on file   • Highest education level: Not on file   Occupational History   • Occupation: [employer]     Employer: DISABLED     Comment: Disabled since -Back Problems   Tobacco Use   • Smoking status: Former Smoker     Packs/day: 1.00     Years: 10.00     Pack years: 10.00     Types: Cigarettes     Last attempt to quit: 1998     Years since quittin.5   • Smokeless tobacco: Former User     Types: Snuff     Quit date:    Substance and Sexual Activity   • Alcohol use: Yes     Frequency: Monthly or less     Drinks per session: 3 or 4     Comment: 3 PER YEAR   • Drug use: No   • Sexual activity: Defer   Social History Narrative    Caffeine use: 0-1 serving daily.    Patient lives at home with wife.            Objective   Physical Exam      GENERAL APPEARANCE: Well developed, well nourished, obese 60-year-old white male in no acute distress.  VITAL SIGNS: per nursing, reviewed and noted  SKIN: no rashes, ulcerations or petechiae.  Head: Normocephalic, atraumatic.   EYES: perrla. EOMI.  ENT:  TM clear, normal voice.  Patient maintained wearing mask patient encounter due to coronavirus pandemic  LUNGS:  normal breath sounds. No retractions.   CARDIOVASCULAR:  regular rate and rhythm, no murmurs.  Good Peripheral pulses.  ABDOMEN: Soft, nontender, normal bowel sounds. No hernia. No ascites.  MUSCULOSKELETAL:  No tenderness. Full ROM. Strength and tone normal.  NEUROLOGIC: Alert, oriented x 3. No gross deficits.   NECK: Supple, symmetric. No tenderness, no masses. Full ROM  Back: full rom, no  paraspinal spasm. No CVA tenderness.   PSYCH: appropriate affect,  : no bladder tenderness or distention, no CVA tenderness      Procedures     No attending provider procedures were performed      ED Course  ED Course as of Jul 08 1651 Wed Jul 08, 2020   1358 EKG interpreted by me reveals sinus rhythm rate 78.    [PF]   1446    COMPARISON: 06/23/2020.     FINDINGS: The patient is status post median sternotomy and CABG  procedure. The heart is normal in size. The mediastinum is unremarkable.  The lungs are clear. There is no pneumothorax.  There are no acute  osseous abnormalities.     IMPRESSION:  No acute cardiopulmonary process.     Continued followup is recommended.     This report was finalized on 7/8/2020 2:34 PM by Aspen Decker M.D..          [PF]   1446 EKG interpreted by me reveals sinus rhythm rate 78.  long QT    [PF]   1547 Troponin T: 0.028 [PF]   1547 COVID19: Not Detected [PF]   1547 Glucose(!): 215 [PF]   1547 BUN: 16 [PF]   1547 Creatinine(!): 1.47 [PF]   1547 Sodium(!): 135 [PF]   1547 Potassium: 4.9 [PF]   1547 Chloride: 101 [PF]   1547 CO2: 23.2 [PF]   1547 Calcium: 9.5 [PF]   1547 Total Protein: 6.3 [PF]   1547 Albumin: 3.70 [PF]   1547 ALT (SGPT)(!): 71 [PF]   1547 AST (SGOT)(!): 61 [PF]   1547 Alkaline Phosphatase: 89 [PF]   1547 Total Bilirubin: 0.8 [PF]   1547 WBC: 6.23 [PF]   1547 Lactate: 1.9 [PF]   1547 Hemoglobin(!): 11.8 [PF]   1547 Hematocrit(!): 35.6 [PF]   1547 Platelets: 179 [PF]   1648 Glucose(!): 500 mg/dL (2+) [PF]   1648 RBC, UA(!): 0-2 [PF]   1648 WBC, UA(!): 3-5 [PF]   1648 Bacteria, UA(!): Trace [PF]   1648 Squamous Epithelial Cells, UA(!): 3-6 [PF]   1648 COVID19: Not Detected [PF]   1648 Troponin T: 0.028 [PF]   1648 Glucose(!): 215 [PF]   1648 BUN: 16 [PF]   1648 Creatinine(!): 1.47 [PF]   1648 Sodium(!): 135 [PF]   1648 Potassium: 4.9 [PF]   1649 CO2: 23.2 [PF]   1649 Calcium: 9.5 [PF]   1649 Total Protein: 6.3 [PF]   1649 Albumin: 3.70 [PF]   1649 ALT  (SGPT)(!): 71 [PF]   1649 AST (SGOT)(!): 61 [PF]   1649 Alkaline Phosphatase: 89 [PF]   1649 Total Bilirubin: 0.8 [PF]   1649 Lactate: 1.9 [PF]      ED Course User Index  [PF] Osito Ragland, DO                                           MDM  60-year-old male presents with generalized weakness nausea and hypertension.  Suspect overdiuresis with the patient's Bumex and spironolactone.  Patient feeling better after IV fluids with improved blood pressure.  Advised to start taking his diuretics every other day instead of daily and monitor his weight for potential volume overload exacerbation.  Advised outpatient follow-up with his family doctor.  Reassuring labs with only mild transaminitis.  Normal electrolytes.  Stable mild renal insufficiency.  Return precautions were discussed.  Final diagnoses:   Dehydration            Osito Ragland DO  07/08/20 [id number]

## 2020-07-08 NOTE — DISCHARGE INSTRUCTIONS
Consider taking your diuretics every other day and weigh yourself daily.  If you find that you are gaining water weight, have lower extremity swelling or shortness of breath then take your diuretics daily for the next 2 days afterwards and then resume the every other day dosing.  Follow-up with your family doctor

## 2020-07-09 ENCOUNTER — PATIENT OUTREACH (OUTPATIENT)
Dept: CASE MANAGEMENT | Facility: OTHER | Age: 60
End: 2020-07-09

## 2020-07-09 ENCOUNTER — EPISODE CHANGES (OUTPATIENT)
Dept: CASE MANAGEMENT | Facility: OTHER | Age: 60
End: 2020-07-09

## 2020-07-09 LAB
LAB AP CASE REPORT: NORMAL
PATH REPORT.FINAL DX SPEC: NORMAL

## 2020-07-09 NOTE — OUTREACH NOTE
"ED Potential Covid Discharge Follow-up    Called patient in follow up to ED visit 7-8-20 with generalized weakness, nausea, hypotension/ Dehydration.  Patient was tested for Covid-19 virus; stated he was notified of Negative results in the ED.  Patient reports:  He is feeling good, except he has a headache.  Reported his BP this AM was 128/73.  Voiced understanding of ED recommendations to monitor BP every day; to monitor weight every day; to take fluid pill QOD;  if increase fluid weight, LE swelling, or SOA, to take fluid pill Q Day x 2 days, then return to QOD.  Patient said he fills a pill box weekly and is compliant with daily medications as ordered.  He voiced clear understanding of which pill was his \"fluid pill\" and what it looks like in the pill box.  Discussed with patient, usually fluid weight is a gain of 2-3 lbs over night, or 5-7 lbs/week, but to check with his PCP for her recommendation.    Patient lives with his wife.  Has assistance from wife and family if needed.  Reports no barriers to getting prescriptions filled and taking medications as ordered.  Reports no food or transportation insecurities.    Reviewed with patient: Quarantine precautions (voiced understanding and compliance); ED discharge recommendations, AVS from ED; 24/7 Nurse Triage Line, [phone]; Covid-19 Hotline#, [phone].  Discussed importance of close PCP f/u, telehealth and video appts. Patient said he did not feel he needed to see PCP at this time; he understands he has an appt with PCP 8-25-20; he said he would call or message PCP in [facility] if symptoms increased and he needed to see PCP sooner.  No questions or concerns voiced at this time.  This note will be routed to PCP.    Radha Lord RN  Ambulatory     7/9/2020, 11:14      "

## 2020-08-03 ENCOUNTER — APPOINTMENT (OUTPATIENT)
Dept: CT IMAGING | Facility: HOSPITAL | Age: 60
End: 2020-08-03

## 2020-08-03 ENCOUNTER — HOSPITAL ENCOUNTER (INPATIENT)
Facility: HOSPITAL | Age: 60
LOS: 3 days | Discharge: HOME | End: 2020-08-06
Attending: EMERGENCY MEDICINE | Admitting: INTERNAL MEDICINE

## 2020-08-03 ENCOUNTER — APPOINTMENT (OUTPATIENT)
Dept: GENERAL RADIOLOGY | Facility: HOSPITAL | Age: 60
End: 2020-08-03

## 2020-08-03 DIAGNOSIS — Z74.09 IMPAIRED MOBILITY AND ADLS: ICD-10-CM

## 2020-08-03 DIAGNOSIS — R55 NEAR SYNCOPE: ICD-10-CM

## 2020-08-03 DIAGNOSIS — R42 LIGHTHEADEDNESS: ICD-10-CM

## 2020-08-03 DIAGNOSIS — R56.9 SEIZURE-LIKE ACTIVITY (HCC): ICD-10-CM

## 2020-08-03 DIAGNOSIS — I95.9 HYPOTENSION, UNSPECIFIED HYPOTENSION TYPE: Primary | ICD-10-CM

## 2020-08-03 DIAGNOSIS — Z78.9 IMPAIRED MOBILITY AND ADLS: ICD-10-CM

## 2020-08-03 PROBLEM — E87.20 LACTIC ACIDOSIS: Status: ACTIVE | Noted: 2020-08-03

## 2020-08-03 LAB
ALBUMIN SERPL-MCNC: 4.2 G/DL (ref 3.5–5.2)
ALBUMIN/GLOB SERPL: 1.4 G/DL
ALP SERPL-CCNC: 101 U/L (ref 39–117)
ALT SERPL W P-5'-P-CCNC: 74 U/L (ref 1–41)
ANION GAP SERPL CALCULATED.3IONS-SCNC: 11 MMOL/L (ref 5–15)
AST SERPL-CCNC: 76 U/L (ref 1–40)
B PARAPERT DNA SPEC QL NAA+PROBE: NOT DETECTED
B PERT DNA SPEC QL NAA+PROBE: NOT DETECTED
BASOPHILS # BLD AUTO: 0.05 10*3/MM3 (ref 0–0.2)
BASOPHILS NFR BLD AUTO: 0.6 % (ref 0–1.5)
BILIRUB SERPL-MCNC: 1 MG/DL (ref 0–1.2)
BILIRUB UR QL STRIP: NEGATIVE
BUN SERPL-MCNC: 13 MG/DL (ref 6–20)
BUN/CREAT SERPL: 7.7 (ref 7–25)
C PNEUM DNA NPH QL NAA+NON-PROBE: NOT DETECTED
CALCIUM SPEC-SCNC: 9.5 MG/DL (ref 8.6–10.5)
CHLORIDE SERPL-SCNC: 96 MMOL/L (ref 98–107)
CLARITY UR: CLEAR
CO2 SERPL-SCNC: 27 MMOL/L (ref 22–29)
COLOR UR: YELLOW
CREAT SERPL-MCNC: 1.69 MG/DL (ref 0.76–1.27)
D-LACTATE SERPL-SCNC: 2 MMOL/L (ref 0.5–2)
D-LACTATE SERPL-SCNC: 3 MMOL/L (ref 0.5–2)
DEPRECATED RDW RBC AUTO: 42.4 FL (ref 37–54)
EOSINOPHIL # BLD AUTO: 0.2 10*3/MM3 (ref 0–0.4)
EOSINOPHIL NFR BLD AUTO: 2.3 % (ref 0.3–6.2)
ERYTHROCYTE [DISTWIDTH] IN BLOOD BY AUTOMATED COUNT: 14.6 % (ref 12.3–15.4)
FLUAV H1 2009 PAND RNA NPH QL NAA+PROBE: NOT DETECTED
FLUAV H1 HA GENE NPH QL NAA+PROBE: NOT DETECTED
FLUAV H3 RNA NPH QL NAA+PROBE: NOT DETECTED
FLUAV SUBTYP SPEC NAA+PROBE: NOT DETECTED
FLUBV RNA ISLT QL NAA+PROBE: NOT DETECTED
GFR SERPL CREATININE-BSD FRML MDRD: 42 ML/MIN/1.73
GLOBULIN UR ELPH-MCNC: 3.1 GM/DL
GLUCOSE BLDC GLUCOMTR-MCNC: 306 MG/DL (ref 70–130)
GLUCOSE SERPL-MCNC: 153 MG/DL (ref 65–99)
GLUCOSE UR STRIP-MCNC: NEGATIVE MG/DL
HADV DNA SPEC NAA+PROBE: NOT DETECTED
HCOV 229E RNA SPEC QL NAA+PROBE: NOT DETECTED
HCOV HKU1 RNA SPEC QL NAA+PROBE: NOT DETECTED
HCOV NL63 RNA SPEC QL NAA+PROBE: NOT DETECTED
HCOV OC43 RNA SPEC QL NAA+PROBE: NOT DETECTED
HCT VFR BLD AUTO: 43.1 % (ref 37.5–51)
HGB BLD-MCNC: 14.1 G/DL (ref 13–17.7)
HGB UR QL STRIP.AUTO: NEGATIVE
HMPV RNA NPH QL NAA+NON-PROBE: NOT DETECTED
HOLD SPECIMEN: NORMAL
HOLD SPECIMEN: NORMAL
HPIV1 RNA SPEC QL NAA+PROBE: NOT DETECTED
HPIV2 RNA SPEC QL NAA+PROBE: NOT DETECTED
HPIV3 RNA NPH QL NAA+PROBE: NOT DETECTED
HPIV4 P GENE NPH QL NAA+PROBE: NOT DETECTED
IMM GRANULOCYTES # BLD AUTO: 0.16 10*3/MM3 (ref 0–0.05)
IMM GRANULOCYTES NFR BLD AUTO: 1.8 % (ref 0–0.5)
KETONES UR QL STRIP: NEGATIVE
LACTATE HOLD SPECIMEN: NORMAL
LEUKOCYTE ESTERASE UR QL STRIP.AUTO: NEGATIVE
LYMPHOCYTES # BLD AUTO: 1.06 10*3/MM3 (ref 0.7–3.1)
LYMPHOCYTES NFR BLD AUTO: 12.1 % (ref 19.6–45.3)
M PNEUMO IGG SER IA-ACNC: NOT DETECTED
MCH RBC QN AUTO: 26.1 PG (ref 26.6–33)
MCHC RBC AUTO-ENTMCNC: 32.7 G/DL (ref 31.5–35.7)
MCV RBC AUTO: 79.8 FL (ref 79–97)
MONOCYTES # BLD AUTO: 0.39 10*3/MM3 (ref 0.1–0.9)
MONOCYTES NFR BLD AUTO: 4.5 % (ref 5–12)
NEUTROPHILS NFR BLD AUTO: 6.88 10*3/MM3 (ref 1.7–7)
NEUTROPHILS NFR BLD AUTO: 78.7 % (ref 42.7–76)
NITRITE UR QL STRIP: NEGATIVE
NRBC BLD AUTO-RTO: 0 /100 WBC (ref 0–0.2)
NT-PROBNP SERPL-MCNC: 42.8 PG/ML (ref 0–900)
PH UR STRIP.AUTO: <=5 [PH] (ref 5–8)
PLATELET # BLD AUTO: 254 10*3/MM3 (ref 140–450)
PMV BLD AUTO: 9.9 FL (ref 6–12)
POTASSIUM SERPL-SCNC: 4 MMOL/L (ref 3.5–5.2)
PROCALCITONIN SERPL-MCNC: 0.18 NG/ML (ref 0–0.25)
PROT SERPL-MCNC: 7.3 G/DL (ref 6–8.5)
PROT UR QL STRIP: NEGATIVE
RBC # BLD AUTO: 5.4 10*6/MM3 (ref 4.14–5.8)
RHINOVIRUS RNA SPEC NAA+PROBE: NOT DETECTED
RSV RNA NPH QL NAA+NON-PROBE: NOT DETECTED
SARS-COV-2 RNA NPH QL NAA+NON-PROBE: NOT DETECTED
SODIUM SERPL-SCNC: 134 MMOL/L (ref 136–145)
SP GR UR STRIP: 1.01 (ref 1–1.03)
TROPONIN T SERPL-MCNC: 0.02 NG/ML (ref 0–0.03)
TSH SERPL DL<=0.05 MIU/L-ACNC: 3.12 UIU/ML (ref 0.27–4.2)
UROBILINOGEN UR QL STRIP: NORMAL
WBC # BLD AUTO: 8.74 10*3/MM3 (ref 3.4–10.8)
WHOLE BLOOD HOLD SPECIMEN: NORMAL
WHOLE BLOOD HOLD SPECIMEN: NORMAL

## 2020-08-03 PROCEDURE — 71250 CT THORAX DX C-: CPT

## 2020-08-03 PROCEDURE — 71045 X-RAY EXAM CHEST 1 VIEW: CPT

## 2020-08-03 PROCEDURE — 82962 GLUCOSE BLOOD TEST: CPT

## 2020-08-03 PROCEDURE — 99285 EMERGENCY DEPT VISIT HI MDM: CPT

## 2020-08-03 PROCEDURE — 63710000001 INSULIN LISPRO (HUMAN) PER 5 UNITS: Performed by: INTERNAL MEDICINE

## 2020-08-03 PROCEDURE — 25010000002 HEPARIN (PORCINE) PER 1000 UNITS: Performed by: INTERNAL MEDICINE

## 2020-08-03 PROCEDURE — 84443 ASSAY THYROID STIM HORMONE: CPT | Performed by: EMERGENCY MEDICINE

## 2020-08-03 PROCEDURE — 81003 URINALYSIS AUTO W/O SCOPE: CPT | Performed by: EMERGENCY MEDICINE

## 2020-08-03 PROCEDURE — 87040 BLOOD CULTURE FOR BACTERIA: CPT | Performed by: EMERGENCY MEDICINE

## 2020-08-03 PROCEDURE — 70450 CT HEAD/BRAIN W/O DYE: CPT

## 2020-08-03 PROCEDURE — 80053 COMPREHEN METABOLIC PANEL: CPT | Performed by: EMERGENCY MEDICINE

## 2020-08-03 PROCEDURE — 94640 AIRWAY INHALATION TREATMENT: CPT

## 2020-08-03 PROCEDURE — 85025 COMPLETE CBC W/AUTO DIFF WBC: CPT | Performed by: EMERGENCY MEDICINE

## 2020-08-03 PROCEDURE — 83605 ASSAY OF LACTIC ACID: CPT | Performed by: EMERGENCY MEDICINE

## 2020-08-03 PROCEDURE — 99223 1ST HOSP IP/OBS HIGH 75: CPT | Performed by: INTERNAL MEDICINE

## 2020-08-03 PROCEDURE — 83880 ASSAY OF NATRIURETIC PEPTIDE: CPT | Performed by: EMERGENCY MEDICINE

## 2020-08-03 PROCEDURE — 70498 CT ANGIOGRAPHY NECK: CPT

## 2020-08-03 PROCEDURE — 84484 ASSAY OF TROPONIN QUANT: CPT | Performed by: EMERGENCY MEDICINE

## 2020-08-03 PROCEDURE — 70496 CT ANGIOGRAPHY HEAD: CPT

## 2020-08-03 PROCEDURE — 0042T HC CT CEREBRAL PERFUSION W/WO CONTRAST: CPT

## 2020-08-03 PROCEDURE — 93005 ELECTROCARDIOGRAM TRACING: CPT | Performed by: EMERGENCY MEDICINE

## 2020-08-03 PROCEDURE — 63710000001 INSULIN DETEMIR PER 5 UNITS: Performed by: INTERNAL MEDICINE

## 2020-08-03 PROCEDURE — 36415 COLL VENOUS BLD VENIPUNCTURE: CPT

## 2020-08-03 PROCEDURE — 93005 ELECTROCARDIOGRAM TRACING: CPT

## 2020-08-03 PROCEDURE — 0202U NFCT DS 22 TRGT SARS-COV-2: CPT | Performed by: INTERNAL MEDICINE

## 2020-08-03 PROCEDURE — 0 IOPAMIDOL PER 1 ML: Performed by: EMERGENCY MEDICINE

## 2020-08-03 PROCEDURE — 84145 PROCALCITONIN (PCT): CPT | Performed by: EMERGENCY MEDICINE

## 2020-08-03 RX ORDER — SODIUM CHLORIDE 0.9 % (FLUSH) 0.9 %
10 SYRINGE (ML) INJECTION AS NEEDED
Status: DISCONTINUED | OUTPATIENT
Start: 2020-08-03 | End: 2020-08-06 | Stop reason: HOSPADM

## 2020-08-03 RX ORDER — PANTOPRAZOLE SODIUM 40 MG/1
40 TABLET, DELAYED RELEASE ORAL EVERY MORNING
Status: DISCONTINUED | OUTPATIENT
Start: 2020-08-04 | End: 2020-08-06 | Stop reason: HOSPADM

## 2020-08-03 RX ORDER — CALCIUM CARBONATE 750 MG/1
750 TABLET, CHEWABLE ORAL 2 TIMES DAILY PRN
Status: DISCONTINUED | OUTPATIENT
Start: 2020-08-03 | End: 2020-08-06 | Stop reason: HOSPADM

## 2020-08-03 RX ORDER — FLUTICASONE PROPIONATE 50 MCG
1 SPRAY, SUSPENSION (ML) NASAL DAILY
Status: DISCONTINUED | OUTPATIENT
Start: 2020-08-03 | End: 2020-08-06 | Stop reason: HOSPADM

## 2020-08-03 RX ORDER — ZAFIRLUKAST 20 MG/1
20 TABLET, FILM COATED ORAL 2 TIMES DAILY
Status: DISCONTINUED | OUTPATIENT
Start: 2020-08-03 | End: 2020-08-06 | Stop reason: HOSPADM

## 2020-08-03 RX ORDER — HEPARIN SODIUM 5000 [USP'U]/ML
5000 INJECTION, SOLUTION INTRAVENOUS; SUBCUTANEOUS EVERY 8 HOURS SCHEDULED
Status: DISCONTINUED | OUTPATIENT
Start: 2020-08-03 | End: 2020-08-06 | Stop reason: HOSPADM

## 2020-08-03 RX ORDER — ATORVASTATIN CALCIUM 40 MG/1
80 TABLET, FILM COATED ORAL NIGHTLY
Status: DISCONTINUED | OUTPATIENT
Start: 2020-08-03 | End: 2020-08-06 | Stop reason: HOSPADM

## 2020-08-03 RX ORDER — BUDESONIDE AND FORMOTEROL FUMARATE DIHYDRATE 80; 4.5 UG/1; UG/1
2 AEROSOL RESPIRATORY (INHALATION)
Status: DISCONTINUED | OUTPATIENT
Start: 2020-08-03 | End: 2020-08-06 | Stop reason: HOSPADM

## 2020-08-03 RX ORDER — BUTALBITAL, ACETAMINOPHEN AND CAFFEINE 50; 325; 40 MG/1; MG/1; MG/1
1 TABLET ORAL EVERY 6 HOURS PRN
Status: DISCONTINUED | OUTPATIENT
Start: 2020-08-03 | End: 2020-08-06 | Stop reason: HOSPADM

## 2020-08-03 RX ORDER — CETIRIZINE HYDROCHLORIDE 10 MG/1
10 TABLET ORAL DAILY
Status: DISCONTINUED | OUTPATIENT
Start: 2020-08-03 | End: 2020-08-06 | Stop reason: HOSPADM

## 2020-08-03 RX ORDER — NICOTINE POLACRILEX 4 MG
15 LOZENGE BUCCAL
Status: DISCONTINUED | OUTPATIENT
Start: 2020-08-03 | End: 2020-08-06 | Stop reason: HOSPADM

## 2020-08-03 RX ORDER — ACETAMINOPHEN 325 MG/1
650 TABLET ORAL EVERY 4 HOURS PRN
Status: DISCONTINUED | OUTPATIENT
Start: 2020-08-03 | End: 2020-08-06 | Stop reason: HOSPADM

## 2020-08-03 RX ORDER — DOCUSATE SODIUM 100 MG/1
100 CAPSULE, LIQUID FILLED ORAL 2 TIMES DAILY PRN
Status: DISCONTINUED | OUTPATIENT
Start: 2020-08-03 | End: 2020-08-06 | Stop reason: HOSPADM

## 2020-08-03 RX ORDER — GABAPENTIN 300 MG/1
600 CAPSULE ORAL EVERY 12 HOURS SCHEDULED
Status: DISCONTINUED | OUTPATIENT
Start: 2020-08-03 | End: 2020-08-06 | Stop reason: HOSPADM

## 2020-08-03 RX ORDER — CLOPIDOGREL BISULFATE 75 MG/1
75 TABLET ORAL DAILY
Status: DISCONTINUED | OUTPATIENT
Start: 2020-08-03 | End: 2020-08-06 | Stop reason: HOSPADM

## 2020-08-03 RX ORDER — SODIUM CHLORIDE 0.9 % (FLUSH) 0.9 %
10 SYRINGE (ML) INJECTION EVERY 12 HOURS SCHEDULED
Status: DISCONTINUED | OUTPATIENT
Start: 2020-08-03 | End: 2020-08-06 | Stop reason: HOSPADM

## 2020-08-03 RX ORDER — ACETAMINOPHEN 160 MG/5ML
650 SOLUTION ORAL EVERY 4 HOURS PRN
Status: DISCONTINUED | OUTPATIENT
Start: 2020-08-03 | End: 2020-08-06 | Stop reason: HOSPADM

## 2020-08-03 RX ORDER — DEXTROSE MONOHYDRATE 25 G/50ML
25 INJECTION, SOLUTION INTRAVENOUS
Status: DISCONTINUED | OUTPATIENT
Start: 2020-08-03 | End: 2020-08-06 | Stop reason: HOSPADM

## 2020-08-03 RX ORDER — ACETAMINOPHEN 650 MG/1
650 SUPPOSITORY RECTAL EVERY 4 HOURS PRN
Status: DISCONTINUED | OUTPATIENT
Start: 2020-08-03 | End: 2020-08-06 | Stop reason: HOSPADM

## 2020-08-03 RX ORDER — ALBUTEROL SULFATE 90 UG/1
2 AEROSOL, METERED RESPIRATORY (INHALATION)
Status: DISCONTINUED | OUTPATIENT
Start: 2020-08-03 | End: 2020-08-06 | Stop reason: HOSPADM

## 2020-08-03 RX ORDER — ASPIRIN 81 MG/1
81 TABLET ORAL DAILY
Status: DISCONTINUED | OUTPATIENT
Start: 2020-08-03 | End: 2020-08-06 | Stop reason: HOSPADM

## 2020-08-03 RX ORDER — AZELASTINE 1 MG/ML
1 SPRAY, METERED NASAL 2 TIMES DAILY PRN
Status: DISCONTINUED | OUTPATIENT
Start: 2020-08-03 | End: 2020-08-06 | Stop reason: HOSPADM

## 2020-08-03 RX ORDER — ALBUTEROL SULFATE 90 UG/1
2 AEROSOL, METERED RESPIRATORY (INHALATION) EVERY 4 HOURS PRN
Status: DISCONTINUED | OUTPATIENT
Start: 2020-08-03 | End: 2020-08-06 | Stop reason: HOSPADM

## 2020-08-03 RX ORDER — HYDROCODONE BITARTRATE AND ACETAMINOPHEN 5; 325 MG/1; MG/1
1 TABLET ORAL EVERY 4 HOURS PRN
Status: DISCONTINUED | OUTPATIENT
Start: 2020-08-03 | End: 2020-08-06 | Stop reason: HOSPADM

## 2020-08-03 RX ADMIN — HEPARIN SODIUM 5000 UNITS: 5000 INJECTION, SOLUTION INTRAVENOUS; SUBCUTANEOUS at 21:35

## 2020-08-03 RX ADMIN — CLOPIDOGREL BISULFATE 75 MG: 75 TABLET ORAL at 21:34

## 2020-08-03 RX ADMIN — BROMOCRIPTINE MESYLATE 3.2 MG: 0.8 TABLET ORAL at 21:44

## 2020-08-03 RX ADMIN — VORTIOXETINE 20 MG: 20 TABLET, FILM COATED ORAL at 21:44

## 2020-08-03 RX ADMIN — INSULIN LISPRO 7 UNITS: 100 INJECTION, SOLUTION INTRAVENOUS; SUBCUTANEOUS at 21:55

## 2020-08-03 RX ADMIN — CETIRIZINE HYDROCHLORIDE 10 MG: 10 TABLET, FILM COATED ORAL at 21:34

## 2020-08-03 RX ADMIN — BUDESONIDE AND FORMOTEROL FUMARATE DIHYDRATE 2 PUFF: 80; 4.5 AEROSOL RESPIRATORY (INHALATION) at 20:21

## 2020-08-03 RX ADMIN — IOPAMIDOL 120 ML: 755 INJECTION, SOLUTION INTRAVENOUS at 14:35

## 2020-08-03 RX ADMIN — INSULIN DETEMIR 10 UNITS: 100 INJECTION, SOLUTION SUBCUTANEOUS at 21:39

## 2020-08-03 RX ADMIN — ASPIRIN 81 MG: 81 TABLET, COATED ORAL at 21:34

## 2020-08-03 RX ADMIN — SODIUM CHLORIDE, PRESERVATIVE FREE 10 ML: 5 INJECTION INTRAVENOUS at 21:35

## 2020-08-03 RX ADMIN — SODIUM CHLORIDE 1000 ML: 9 INJECTION, SOLUTION INTRAVENOUS at 13:53

## 2020-08-03 RX ADMIN — SODIUM CHLORIDE 1000 ML: 9 INJECTION, SOLUTION INTRAVENOUS at 14:06

## 2020-08-03 RX ADMIN — ALBUTEROL SULFATE 2 PUFF: 108 AEROSOL, METERED RESPIRATORY (INHALATION) at 20:23

## 2020-08-03 RX ADMIN — GABAPENTIN 600 MG: 300 CAPSULE ORAL at 21:33

## 2020-08-03 RX ADMIN — ATORVASTATIN CALCIUM 80 MG: 40 TABLET, FILM COATED ORAL at 21:32

## 2020-08-03 RX ADMIN — FLUTICASONE PROPIONATE 1 SPRAY: 50 SPRAY, METERED NASAL at 21:55

## 2020-08-03 RX ADMIN — AMITRIPTYLINE HYDROCHLORIDE 75 MG: 50 TABLET, FILM COATED ORAL at 21:32

## 2020-08-03 RX ADMIN — ZAFIRLUKAST 20 MG: 20 TABLET, COATED ORAL at 21:37

## 2020-08-03 NOTE — H&P
[facility] Medicine Services  HISTORY AND PHYSICAL    Patient Name: Denzel Harding  : 1960  MRN: [id number]  Primary Care Physician: Isaura Godoy MD  Date of admission: 8/3/2020      Subjective   Subjective     Chief Complaint:  Pre-syncope     HPI:  Denzel Harding is a 60 y.o. male with history of DM, CHF, CKD 3, depression, h/o CVA, HTN, asthma, CAD s/p CABG who presents today with an episode of pre-syncope. Wife helps provide history because he does not remember details of the episode. States he was sitting down eating breakfast and felt like he was going to pass out. Remembers his wife asking him the president and year. Wife states his left arm was shaking, he had a weak smile bilaterally, nauseated with then an episode of vomiting. Wife took him to the ED where BP was noted to be low. CVA workup with CT perfusion negative. Patient states that he did take his BP mediations prior to episode. Denies recent illness or change in medications.     SOA with his asthma and with anxiety during episode today, Denies exposure to COVID. Denies new or change in cough or change in taste/smell.     Review of Systems   Gen- No fevers, chills  CV- No chest pain, palpitations  Resp- No cough, + dyspnea  GI- No N/V/D, abd pain    All other systems reviewed and are negative.     Personal History     Past Medical History:   Diagnosis Date   • Anxiety    • Arthritis    • Asthma    • Brachial neuritis 2017   • Cellulitis     left arm and right leg    • Chest pain    • CHF (congestive heart failure) (CMS/[facility])     Patient reported history May 2020    • COPD (chronic obstructive pulmonary disease) (CMS/[facility])    • Coronary artery disease     Patient reported CABG , 3 vessel   • Depression    • Diabetes mellitus (CMS/[facility])     diagnosed in , checks fsbg 3-4x/day   • Dizziness    • Edema    • Elevated cholesterol    • GERD (gastroesophageal reflux disease)    • H/O chest x-ray 2015     Mild Left base atelectasis   • H/O echocardiogram 07/05/2015    Normal LVSF. EF of 60-65%. Grade 1 diastolic dysfunction of the LV myocardium. No evidence of pericardial effusion   • H/O exercise stress test    • Hearing loss     No use of hearing aids   • History of fracture     Reported 2 bones in left foot and a finger   • History of herniated intervertebral disc     History of left L5-S1 disc herniation   • History of pneumonia    • History of pneumonia    • Hypertension    • LOM (loss of memory) 1/19/2017   • Lower back pain     Right   • Measles    • Neuropathy     feet    • EDD treated with BiPAP     BiPAP HS - instructed to bring mask/machine DOS (setting 13 and 5)   • Palpitations    • Pericardial effusion    • Poor dentition     Patient reported missing multiple teeth   • Renal disorder    • Seizure disorder (CMS/HCC)    • Seizures (CMS/HCC)     FOCAL last 2009   • Shortness of breath 1/19/2017   • SOB (shortness of breath)    • Staph infection     back after sugery at the incision site    • Stroke (CMS/HCC)     x2 most recent , slight weakness in hands occasionaly    • Wears glasses        Past Surgical History:   Procedure Laterality Date   • BACK SURGERY     • CARDIAC CATHETERIZATION     • CARDIAC CATHETERIZATION N/A 8/11/2017    Procedure: Coronary angiography;  Surgeon: Dallas Kim MD;  Location: [facility] CATH INVASIVE LOCATION;  Service:    • CARDIAC CATHETERIZATION N/A 8/11/2017    Procedure: Left Heart Cath;  Surgeon: Dallas Kim MD;  Location: [facility] CATH INVASIVE LOCATION;  Service:    • CARDIAC CATHETERIZATION N/A 8/11/2017    Procedure: Left ventriculography;  Surgeon: Dallas Kim MD;  Location: [facility] INVASIVE LOCATION;  Service:    • CARDIAC CATHETERIZATION      2002 x1 stent,  x1 stent   • CARDIOVASCULAR STRESS TEST  07/03/2017    WITH DR KIM AT [facility]   • CARPAL TUNNEL RELEASE Right    • CATARACT EXTRACTION W/ INTRAOCULAR LENS IMPLANT Right 6/12/2017     Procedure: CATARACT PHACO EXTRACTION WITH INTRAOCULAR LENS IMPLANT RIGHT ;  Surgeon: Natalie Cao MD;  Location: [facility] OR;  Service:    • CATARACT EXTRACTION W/ INTRAOCULAR LENS IMPLANT Left 7/10/2017    Procedure: CATARACT PHACO EXTRACTION WITH INTRAOCULAR LENS IMPLANT LEFT;  Surgeon: Natalie Cao MD;  Location: [facility] OR;  Service:    • CHOLECYSTECTOMY     • COLONOSCOPY     • COLONOSCOPY N/A 7/2/2020    Procedure: COLONOSCOPY;  Surgeon: Petra Crowell MD;  Location: [facility] ENDOSCOPY;  Service: Gastroenterology;  Laterality: N/A;  poor prep   • CORONARY ANGIOPLASTY WITH STENT PLACEMENT      X1-LAD   • CORONARY ARTERY BYPASS GRAFT N/A 8/15/2017    Procedure: CORONARY ARTERY BYPASS GRAFTING x 3 UTILIZING THE LEFT INTERNAL MAMMARY ARTERY WITH ENDOSCOPIC VEIN HARVESTING OF THE RIGHT GREATER SAPHENOUS VEIN, HAMLET, LAD ENDARECTOMY;  Surgeon: London Damon MD;  Location: [facility] OR;  Service:    • ENDOSCOPY     • EYE CAPSULOTOMY WITH LASER Right 11/25/2019    Procedure: EYE CAPSULOTOMY WITH LASER RIGHT;  Surgeon: Natalie Cao MD;  Location: [facility] OR;  Service: Ophthalmology   • EYE CAPSULOTOMY WITH LASER Left 12/9/2019    Procedure: EYE CAPSULOTOMY WITH LASER LEFT;  Surgeon: Natalie Cao MD;  Location: [facility] OR;  Service: Ophthalmology   • EYE SURGERY      cataract surgery both eyes   • INTERVENTIONAL RADIOLOGY PROCEDURE Bilateral 12/31/2019    Procedure: Carotid Cerebral Angiogram;  Surgeon: Damian Dubois MD;  Location: [facility] CATH INVASIVE LOCATION;  Service: Interventional Radiology   • KNEE ARTHROSCOPY Bilateral    • KNEE ARTHROSCOPY Right 2010    Dr Lewis   • KNEE ARTHROSCOPY Left 2008    Dr Jameson   • MOUTH SURGERY      Oral extractions   • NEUROPLASTY / TRANSPOSITION ULNAR NERVE AT ELBOW Left    • OTHER SURGICAL HISTORY      Foraminotomy and discectomy   • PERICARDIAL WINDOW N/A 8/25/2017    Procedure: PERICARDIAL WINDOW;  Surgeon: Freeman Phillips MD;  Location:  [facility] OR;  Service:         Family History: family history includes Alcohol abuse in his father; Arthritis in his mother; Heart disease in some other family members; Hypertension in his mother and other family members; Other in an other family member; Parkinsonism in an other family member; Stroke in some other family members. Otherwise pertinent FHx was reviewed and unremarkable.     Social History:  reports that he quit smoking about 22 years ago. His smoking use included cigarettes. He has a 10.00 pack-year smoking history. He quit smokeless tobacco use about 3 years ago.  His smokeless tobacco use included snuff. He reports that he drinks alcohol. He reports that he does not use drugs.  Social History     Social History Narrative    Caffeine use: 0-1 serving daily.    Patient lives at home with wife.        Medications:  Available home medication information reviewed.    (Not in a hospital admission)    Allergies   Allergen Reactions   • Sulfa Antibiotics Anaphylaxis, Nausea And Vomiting and Delirium   • Invokana [Canagliflozin] Unknown - Low Severity     DKA   • Codeine Nausea And Vomiting     Can only take with Phenergan   • Morphine Unknown - Low Severity     Does not work. It causes pain       Objective   Objective     Vital Signs:   Temp:  [97.8 °F (36.6 °C)] 97.8 °F (36.6 °C)  Heart Rate:  [80-90] 85  Resp:  [16-22] 16  BP: ()/(48-78) 113/68   Total (NIH Stroke Scale): 2    Physical Exam   Constitutional: Awake, alert  Eyes: PERRLA, sclerae anicteric, no conjunctival injection  HENT: NCAT, mucous membranes moist  Neck: Supple, no thyromegaly, no lymphadenopathy, trachea midline  Respiratory: Clear to auscultation bilaterally, nonlabored respirations - no wheeze/rales   Cardiovascular: RRR, no murmurs, rubs, or gallops, palpable pedal pulses bilaterally  Gastrointestinal: Positive bowel sounds, soft, nontender, nondistended  Musculoskeletal: trace bilateral ankle edema, no clubbing or cyanosis to extremities  Psychiatric:  Appropriate affect, cooperative  Neurologic: Oriented x 3, strength symmetric in all extremities, Cranial Nerves grossly intact to confrontation, speech clear  Skin: No rashes      Results Reviewed:  I have personally reviewed current lab and radiology data.    Results from last 7 days   Lab Units 08/03/20  1337   WBC 10*3/mm3 8.74   HEMOGLOBIN g/dL 14.1   HEMATOCRIT % 43.1   PLATELETS 10*3/mm3 254     Results from last 7 days   Lab Units 08/03/20  1337   SODIUM mmol/L 134*   POTASSIUM mmol/L 4.0   CHLORIDE mmol/L 96*   CO2 mmol/L 27.0   BUN mg/dL 13   CREATININE mg/dL 1.69*   GLUCOSE mg/dL 153*   CALCIUM mg/dL 9.5   ALT (SGPT) U/L 74*   AST (SGOT) U/L 76*   TROPONIN T ng/mL 0.021   PROBNP pg/mL 42.8   LACTATE mmol/L 3.0*   PROCALCITONIN ng/mL 0.18     Estimated Creatinine Clearance: 65.4 mL/min (A) (by C-G formula based on SCr of 1.69 mg/dL (H)).  Brief Urine Lab Results  (Last result in the past 365 days)      Color   Clarity   Blood   Leuk Est   Nitrite   Protein   CREAT   Urine HCG        07/08/20 1541 Dark Yellow Clear Negative Trace Negative Negative             Imaging Results (Last 24 Hours)     Procedure Component Value Units Date/Time    CT Angiogram Neck [[id number]] Collected:  08/03/20 1456     Updated:  08/03/20 1730    Narrative:       EXAMINATION: CT ANGIOGRAM NECK-, CT ANGIOGRAM HEAD- 08/03/2020     INDICATION: dizzy, right lateral visual field deficit     TECHNIQUE: Axial arterial phase postcontrast CT of the head and neck  with 3-D reconstructions.     The radiation dose reduction device was turned on for each scan per the  ALARA (As Low as Reasonably Achievable) protocol.     COMPARISON: NONE     FINDINGS: The imaged lung apices are clear. The aortic arch is mildly  atherosclerotic with great vessel origins patent. Bilateral carotid  bifurcation/internal carotid artery origins are atherosclerotic with  less than 25%, mild associated narrowing. Bilateral vertebral arteries  are normal in course and  caliber without evidence of significant  stenosis, occlusion or aneurysmal dilatation.     Bilateral proximal intracranial internal carotid arteries are  atherosclerotic with mild associated narrowing. Otherwise, bilateral  anterior, middle and posterior cerebral arteries demonstrate minimal  atherosclerotic change without significant associated narrowing.  Bilateral proximal intracranial vertebral arteries are atherosclerotic  without significant associated narrowing. The dural venous sinuses are  relatively well-opacified and appear patent.       Impression:       Minimal cervical carotid and intracranial atherosclerotic  disease without significant associated narrowing. No large vessel  occlusion, high-grade stenosis or aneurysmal dilatation.     D:  08/03/2020  E:  08/03/2020     This report was finalized on 8/3/2020 5:26 PM by Amaury Fuentes.       CT Angiogram Head [[id number]] Collected:  08/03/20 1456     Updated:  08/03/20 1729    Narrative:       EXAMINATION: CT ANGIOGRAM NECK-, CT ANGIOGRAM HEAD- 08/03/2020     INDICATION: dizzy, right lateral visual field deficit     TECHNIQUE: Axial arterial phase postcontrast CT of the head and neck  with 3-D reconstructions.     The radiation dose reduction device was turned on for each scan per the  ALARA (As Low as Reasonably Achievable) protocol.     COMPARISON: NONE     FINDINGS: The imaged lung apices are clear. The aortic arch is mildly  atherosclerotic with great vessel origins patent. Bilateral carotid  bifurcation/internal carotid artery origins are atherosclerotic with  less than 25%, mild associated narrowing. Bilateral vertebral arteries  are normal in course and caliber without evidence of significant  stenosis, occlusion or aneurysmal dilatation.     Bilateral proximal intracranial internal carotid arteries are  atherosclerotic with mild associated narrowing. Otherwise, bilateral  anterior, middle and posterior cerebral arteries demonstrate  minimal  atherosclerotic change without significant associated narrowing.  Bilateral proximal intracranial vertebral arteries are atherosclerotic  without significant associated narrowing. The dural venous sinuses are  relatively well-opacified and appear patent.       Impression:       Minimal cervical carotid and intracranial atherosclerotic  disease without significant associated narrowing. No large vessel  occlusion, high-grade stenosis or aneurysmal dilatation.     D:  08/03/2020  E:  08/03/2020     This report was finalized on 8/3/2020 5:26 PM by Amaury Fuentes.       XR Chest 1 View [[id number]] Collected:  08/03/20 1508     Updated:  08/03/20 1727    Narrative:       EXAMINATION: XR CHEST 1 VW- 08/03/2020     INDICATION: SOA triage protocol      COMPARISON: November 30, 2019     FINDINGS: Stable degree of cardiac enlargement. Lung volumes remain  diminished and there are vascular markings without overt edema. Probable  trace left pleural effusion. No pneumothorax. No new focal  consolidation.           Impression:       Stable exam with low lung volumes, cardiomegaly, trace left  pleural effusion and mild pulmonary vascular congestion without overt  edema.     D:  08/03/2020  E:  08/03/2020     This report was finalized on 8/3/2020 5:24 PM by Amaury Fuentes.       CT Chest Without Contrast [[id number]] Collected:  08/03/20 1710     Updated:  08/03/20 1717    Narrative:       EXAMINATION: CT CHEST WO CONTRAST-      INDICATION: hypotension, mild soa, abnormal CXR     TECHNIQUE: Axial noncontrast CT of the chest with multiplanar reformats     The radiation dose reduction device was turned on for each scan per the  ALARA (As Low as Reasonably Achievable) protocol.     COMPARISON: October 28, 2017     FINDINGS: Multiple calcified thyroid nodules noted, partially imaged.  Normal course and caliber of the thoracic esophagus. Atherosclerotic  nonaneurysmal thoracic aorta. No bulky mediastinal or hilar  adenopathy.  Three-vessel calcified coronary atherosclerosis. Imaged upper abdomen is  unremarkable. No aggressive soft tissue body wall or suspicious osseous  findings. Lung windows demonstrate no evidence of active lung  parenchymal infectious/inflammatory process or suspicious nodularity. No  definite pleural effusion. No pneumothorax.       Impression:       No acute process in the chest to account for dyspnea.     Coronary and aortic atherosclerosis as above.     Several small calcified thyroid nodules. These can be followed with  nonemergent ultrasound as indicated if not already performed.        This report was finalized on 8/3/2020 5:14 PM by Amaury Fuentes.       CT Cerebral Perfusion With & Without Contrast [[id number]] Collected:  08/03/20 1519     Updated:  08/03/20 1521    Narrative:       EXAMINATION: CT CEREBRAL PERFUSION W WO CONTRAST- 08/03/2020     INDICATION: ataxia, vision loss     TECHNIQUE: Cerebral perfusion analysis was performed using computed  tomography with contrast administration, including post processing of  parametric maps with determination of cerebral blood flow, cerebral  blood volume, and mean transit time.     The radiation dose reduction device was turned on for each scan per the  ALARA (As Low as Reasonably Achievable) protocol.     COMPARISON: NONE.     FINDINGS: Perfusion maps demonstrate symmetric blood volume, relative  flow and mean transit time without evidence of core infarct or tissue at  risk.       Impression:       Perfusion maps demonstrate symmetric blood volume, relative  flow and mean transit time without evidence of core infarct or tissue at  risk.          CT Head Without Contrast [[id number]] Collected:  08/03/20 1453     Updated:  08/03/20 1501    Narrative:       EXAMINATION: CT HEAD WO CONTRAST-      INDICATION: dizzy/lightheaded.  Right lateral visual field defect     TECHNIQUE: Axial noncontrast CT of the head with coronal reformats.     The radiation  dose reduction device was turned on for each scan per the  ALARA (As Low as Reasonably Achievable) protocol.     COMPARISON: October 15, 2019     FINDINGS: No acute hemorrhage, mass or mass effect. Gray-white  differentiation appears grossly maintained without evidence of  territorial ischemia on noncontrast CT head. Mild periventricular white  matter changes of chronic small vessel ischemia and age appropriate  volume loss. Normal ventricular size and configuration. Normal orbits.  The paranasal sinuses are grossly clear. No acute calvarial  abnormalities.       Impression:       No acute intracranial abnormality.        This report was finalized on 8/3/2020 2:56 PM by Amaury Fuentes.           Results for orders placed during the hospital encounter of 05/22/19   Adult Transthoracic Echo Complete W/ Cont if Necessary Per Protocol    Narrative Technically very limited study   1) Mild LVH with normal LV systolic function ( EF is over 55%)  2) Mild left atrial enlargement   3) Trace MR and TR   4) Normal RV function        Assessment/Plan   Assessment & Plan     Active Hospital Problems    Diagnosis POA   • Pre-syncope [R55] Yes   • Lactic acidosis [E87.2] Unknown   • Chronic diastolic CHF (congestive heart failure) (CMS/[facility]) [I50.32] Yes   • COPD (chronic obstructive pulmonary disease) (CMS/[facility]) [J44.9] Yes   • JUAN (acute kidney injury) (CMS/[facility]) [N17.9] Unknown   • Asthma [J45.909] Yes   • Obstructive sleep apnea syndrome [G47.33] Yes   • Chronic intractable headache [R51] Yes   • CVA (S/P tPA) [I63.9] Yes   • History of CVA (cerebrovascular accident) [Z86.73] Not Applicable   • Hypertension [I10] Yes   • Diabetes mellitus (CMS/[facility]) [E11.9] Yes   • Diabetic polyneuropathy associated with type 2 diabetes mellitus (CMS/[facility]) [E11.42] Yes       Denzel Harding is a 60 y.o. male with history of DM, CHF, CKD 3, depression, h/o CVA, HTN, asthma, CAD s/p CABG who presents today with an episode of  pre-syncope.    Pre-syncope   AMS   - Likely related to medications/hypotension, however history of CVA and TIA and questionable seizures   - CT perfusion with no LVO   - Obtain MRI brain  - Obtain AM EEG   - Infectious workup pending   - Hold BP medications  - COVID rule out   - PT/OT  - Consider neuro pending workup     Hypotension  H/O hypertension   - low BP on arrival s/p 2L fluids   - Holding home meds - lisinopril, amlodipine, aldactone, imdur     JUAN   - Cr 1.69; elevated in June 1.4 but 5/20 with normal baseline   - Holding lisinopril, bumex and aldactone   - s/p fluids; monitor     Lactic acidosis   - reflex pending; s/p fluids     CAD s/p CABG   - continue ASA, statin, plavix     Asthma   COPD  - continue symbicort; currently holding home nebulizer - resume once ruled out   - COVID rule out     DM   - holding home meds  - AM A1C   - levemir 10 units; SSI - adjust PRN     Mood  - Continue home trintellix, elavil       DVT prophylaxis:  Heparin     CODE STATUS:  Discussed with patient and wife - full code   Code Status and Medical Interventions:   Ordered at: 08/03/20 1612     Level Of Support Discussed With:    Patient     Code Status:    CPR     Medical Interventions (Level of Support Prior to Arrest):    Full       Admission Status:  I believe this patient meets INPATIENT status due to hypotension, lactic acidosis, need for fluid and covid rule out .  I feel patient’s risk for adverse outcomes and need for care warrant INPATIENT evaluation and I predict the patient’s care encounter to likely last beyond 2 midnights.      Electronically signed by Melisa Briceno DO, 08/03/20, 5:40 PM.

## 2020-08-03 NOTE — NURSING NOTE
"Stroke Navigator CODE STROKE    TIME NOTIFIED OF PATIENTS ARRIVAL 1341, TIME OF PATIENT EVALUATION 1344    HPI    Denzel Harding is a 60 y.o.  male on whom I am evaluating as a Code Stroke for a possible acute stroke.     Mr. 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  The patient states that he awoke this morning in his normal state of health (0800) and was partially into eating his breakfast when his symptoms began.  He states he had a follow-up appointment this afternoon with Dr. Jiménez so he and his wife decided to go ahead and come to [place].  When his symptoms did not improve, he proceeded to come to the ED for further evaluation.  Initial BP was 80/52.      After Dr. Diaz evaluated the patient he ordered a CT head, CTA head/neck, and CTP for further evaluation given the patient's prior stroke history.  He asked that the stroke team be contacted to evaluate the patient.    Upon my assessment the patient continues to be hypotensive, 72/48, however he is alert, oriented, and follows commands.  Pupils are equal and round.  EOM are intact.  He has visual loss in his right eye (LLQ) which he states is new for him.  It is unclear if this is the patient's baseline and he has learned to compensate or if this is a new finding.  Vision in all other quadrants is intact.  He denies double, blurry, or double vision.  Face is symmetrical.  Speech is clear and fluent with no evidence of expressive aphasia.  He moves all extremities equally bilaterally with 5/5 strength.  He does complain of mild fine motor difficulties in his " "right hand however this has been present since his last stroke.  Finger-to-nose is normal in bilateral upper extremities.  Heel-to-shin is abnormal in his right lower extremity.  The patient states that he ambulates with a cane and his balance has been \"off\" since his last stroke.   Sensation is currently intact.  He denies pain at this time.    Code Stroke location: ED    · Last known well: 1000    · GCS: 15  · Baseline level of function known: yes. Modified Scottie: 1-2   · Current symptoms include; ataxic and visual field cut, affecting primarily the right lower extremity and right eye (LLQ). Symptoms are currently unchanged.   · Patient is a candidate for thrombolytic therapy.  Please see Dr. Diaz's documentation regarding decision not to administer IV alteplase.  · Patient is not a candidate for Neuro Intervention due to no LVO (large vessel occlusion) or flow-limiting stenosis present on CTA head/neck or CTP.  This was reviewed by Dr. Dubois.    Stroke risk factors: diabetes mellitus, hyperlipidemia, hypertension, physical inactivity and/or obesity, prior stroke and TIA's.     Prior stroke history: yes    Antiplatelet therapy: Plavix 75mg, Aspirin 81mg  Anticoagulation: none     · Imaging performed: CT head, CTA head, CTA neck and CT perfusion      NIHSS    Interval: baseline  1a. Level of Consciousness: 0-->Alert, keenly responsive  1b. LOC Questions: 0-->Answers both questions correctly  1c. LOC Commands: 0-->Performs both tasks correctly  2. Best Gaze: 0-->Normal  3. Visual: 1-->Partial hemianopia(right eye, left lower quad)  4. Facial Palsy: 0-->Normal symmetrical movements  5a. Motor Arm, Left: 0-->No drift, limb holds 90 (or 45) degrees for full 10 secs  5b. Motor Arm, Right: 0-->No drift, limb holds 90 (or 45) degrees for full 10 secs  6a. Motor Leg, Left: 0-->No drift, leg holds 30 degree position for full 5 secs  6b. Motor Leg, Right: 0-->No drift, leg holds 30 degree position for full 5 secs  7. Limb " "Ataxia: 1-->Present in one limb(right leg)  8. Sensory: 0-->Normal, no sensory loss  9. Best Language: 0-->No aphasia, normal  10. Dysarthria: 0-->Normal  11. Extinction and Inattention (formerly Neglect): 0-->No abnormality    Total (NIH Stroke Scale): 2       PLAN    There is no evidence of a large vessel occlusion ischemic stroke or flow-limiting stenosis present on the CTP or CTA head/neck.  As such, there is no role for acute neuro intervention.      Will defer to Dr. Diaz for further diagnostic imaging and disposition.      Imaging results discussed with the patient and his wife who is currently at the bedside.    Addendum: I have spoke with the patient's wife and updated her on the imaging results.  She states that the patient was in his normal state of health this morning and was joking and laughing.  When he went to go eat his breakfast he suddenly became unresponsive and was staring off into space.  She then states after he came around he went to place his right hand on the table, this is when she noticed it \"tremoring\".  She did not notice any abnormal movements in any other part of his body.  The patient does not recall any of this.      The wife confirms that the patient has not had a seizure in over 4 years.  He was previously followed by Dr. Shoemaker and was taken off his medications approximately 2 years ago (gabapentin, valproic acid, and topiramate).      Hansa Mathew RN EXTENDER/STROKE NAVIGATOR    "

## 2020-08-03 NOTE — ED PROVIDER NOTES
EMERGENCY DEPARTMENT ENCOUNTER    Room Number:  N642/1  Date of encounter:  8/4/2020  PCP: Isaura Godoy MD  Historian: Patient      HPI:  Chief Complaint: Lightheadedness, near syncope    A complete HPI/ROS/PMH/PSH/SH/FH are unobtainable due to: N/A    Context: Denzel Harding is a 60 y.o. male who presents to the ED c/o lightheadedness and near syncope.  He states that he felt normal yesterday and also felt normal this morning.  Approximately 10 AM he was sitting at the kitchen table and suddenly felt very lightheaded and felt like he was going to pass out.  He states that his whole body felt numb, bilaterally with no unilateral distinction.  Patient has a history of TIAs and hypotension.  His hypotensive episodes are unclear etiology states that they have not found a cause for them in the past.  He denies any fever, chills, chest pain, or shortness of breath.  He states that when he was having the pronounced episode at home and felt very lightheaded he did have some anxiety associated with that and that was likely more the cause of his mild, vague, shortness of breath, he states that he never had significant shortness of breath this morning.  He denies abdominal pain, nausea, or vomiting.  He states that he has been hydrating normally and has not missed any doses of his medication.  He has had no recent medication changes.      PAST MEDICAL HISTORY  Active Ambulatory Problems     Diagnosis Date Noted   • Coronary arteriosclerosis in native artery 06/07/2016   • Lumbar radiculopathy 11/02/2016   • Hypercholesterolemia 11/02/2016   • Diabetic polyneuropathy associated with type 2 diabetes mellitus (CMS/[facility]) 11/11/2016   • Migraine with aura and with status migrainosus 11/11/2016   • Palpitations 01/19/2017   • Seizure disorder (CMS/[facility]) 01/19/2017   • GERD (gastroesophageal reflux disease) 01/19/2017   • Brachial neuritis 01/19/2017   • Cervical radiculopathy 01/19/2017   • LOM (loss of memory) 01/19/2017   •  Anxiety 04/11/2017   • Diabetes mellitus (CMS/HCC) 04/11/2017   • Lower back pain 04/11/2017   • Hypertension 06/12/2017   • History of CVA (cerebrovascular accident) 08/15/2017   • Non morbid obesity due to excess calories 08/15/2017   • Post-infarction pericarditis (CMS/HCC) 08/30/2017   • HCAP (healthcare-associated pneumonia) 09/01/2017   • CVA (S/P tPA) 12/17/2017   • Acute bronchitis 12/20/2017   • Type 2 diabetes mellitus (CMS/HCC) 01/28/2018   • Headache syndrome, complicated 01/28/2018   • Chronic intractable headache 05/09/2018   • Obstructive sleep apnea syndrome 05/22/2019   • Acute exacerbation of asthma with allergic rhinitis 05/22/2019   • Asthma 05/24/2019   • Thrush 06/05/2019   • Acute diastolic CHF (congestive heart failure) (CMS/HCC) 09/09/2019   • Chronic diastolic CHF (congestive heart failure) (CMS/HCC) 09/14/2019   • COPD (chronic obstructive pulmonary disease) (CMS/HCC) 09/14/2019   • JUAN (acute kidney injury) (CMS/HCC) 09/14/2019   • Acute sinusitis 09/14/2019   • [place] ronnie's stroke 12/16/2019   • Vertebrobasilar insufficiency 12/16/2019   • Personal history of colonic polyps 06/18/2020     Resolved Ambulatory Problems     Diagnosis Date Noted   • Left-sided weakness 12/17/2017   • Chest pain 09/14/2019   • Acute metabolic encephalopathy 09/14/2019     Past Medical History:   Diagnosis Date   • Arthritis    • Cellulitis    • CHF (congestive heart failure) (CMS/HCC)    • Coronary artery disease    • Depression    • Dizziness    • Edema    • Elevated cholesterol    • H/O chest x-ray 07/04/2015   • H/O echocardiogram 07/05/2015   • H/O exercise stress test    • Hearing loss    • History of fracture    • History of herniated intervertebral disc    • History of pneumonia    • History of pneumonia    • Measles    • Neuropathy    • EDD treated with BiPAP    • Pericardial effusion    • Poor dentition    • Renal disorder    • Seizures (CMS/HCC)    • Shortness of breath 1/19/2017   • SOB (shortness  of breath)    • Staph infection    • Stroke (CMS/HCC)    • Wears glasses          PAST SURGICAL HISTORY  Past Surgical History:   Procedure Laterality Date   • BACK SURGERY     • CARDIAC CATHETERIZATION     • CARDIAC CATHETERIZATION N/A 8/11/2017    Procedure: Coronary angiography;  Surgeon: Dallas Hernandez MD;  Location: [facility] CATH INVASIVE LOCATION;  Service:    • CARDIAC CATHETERIZATION N/A 8/11/2017    Procedure: Left Heart Cath;  Surgeon: Dallas Hernandez MD;  Location: [facility] CATH INVASIVE LOCATION;  Service:    • CARDIAC CATHETERIZATION N/A 8/11/2017    Procedure: Left ventriculography;  Surgeon: Dallas Hernandez MD;  Location: [facility] CATH INVASIVE LOCATION;  Service:    • CARDIAC CATHETERIZATION      2002 x1 stent,  x1 stent   • CARDIOVASCULAR STRESS TEST  07/03/2017    WITH DR HERNANDEZ AT [facility]   • CARPAL TUNNEL RELEASE Right    • CATARACT EXTRACTION W/ INTRAOCULAR LENS IMPLANT Right 6/12/2017    Procedure: CATARACT PHACO EXTRACTION WITH INTRAOCULAR LENS IMPLANT RIGHT ;  Surgeon: Natalie Cao MD;  Location: [facility] OR;  Service:    • CATARACT EXTRACTION W/ INTRAOCULAR LENS IMPLANT Left 7/10/2017    Procedure: CATARACT PHACO EXTRACTION WITH INTRAOCULAR LENS IMPLANT LEFT;  Surgeon: Natalie Cao MD;  Location: [facility] OR;  Service:    • CHOLECYSTECTOMY     • COLONOSCOPY     • COLONOSCOPY N/A 7/2/2020    Procedure: COLONOSCOPY;  Surgeon: Petra Crowell MD;  Location: [facility] ENDOSCOPY;  Service: Gastroenterology;  Laterality: N/A;  poor prep   • CORONARY ANGIOPLASTY WITH STENT PLACEMENT      X1-LAD   • CORONARY ARTERY BYPASS GRAFT N/A 8/15/2017    Procedure: CORONARY ARTERY BYPASS GRAFTING x 3 UTILIZING THE LEFT INTERNAL MAMMARY ARTERY WITH ENDOSCOPIC VEIN HARVESTING OF THE RIGHT GREATER SAPHENOUS VEIN, HAMLET, LAD ENDARECTOMY;  Surgeon: London Damon MD;  Location: [facility] OR;  Service:    • ENDOSCOPY     • EYE CAPSULOTOMY WITH LASER Right 11/25/2019    Procedure: EYE CAPSULOTOMY WITH LASER  RIGHT;  Surgeon: Natalie Cao MD;  Location:  [facility] OR;  Service: Ophthalmology   • EYE CAPSULOTOMY WITH LASER Left 2019    Procedure: EYE CAPSULOTOMY WITH LASER LEFT;  Surgeon: Natalie Cao MD;  Location:  [facility] OR;  Service: Ophthalmology   • EYE SURGERY      cataract surgery both eyes   • INTERVENTIONAL RADIOLOGY PROCEDURE Bilateral 2019    Procedure: Carotid Cerebral Angiogram;  Surgeon: Damian Dubois MD;  Location:  [facility] CATH INVASIVE LOCATION;  Service: Interventional Radiology   • KNEE ARTHROSCOPY Bilateral    • KNEE ARTHROSCOPY Right     Dr Lewis   • KNEE ARTHROSCOPY Left     Dr Jameson   • MOUTH SURGERY      Oral extractions   • NEUROPLASTY / TRANSPOSITION ULNAR NERVE AT ELBOW Left    • OTHER SURGICAL HISTORY      Foraminotomy and discectomy   • PERICARDIAL WINDOW N/A 2017    Procedure: PERICARDIAL WINDOW;  Surgeon: Freeman Phillips MD;  Location:  [facility] OR;  Service:          FAMILY HISTORY  Family History   Problem Relation Age of Onset   • Hypertension Mother    • Arthritis Mother    • Alcohol abuse Father    • Heart disease Other    • Stroke Other    • Hypertension Other    • Other Other         Neurologic disorder   • Parkinsonism Other    • Stroke Other    • Heart disease Other    • Hypertension Other    • Heart disease Other    • Stroke Other    • Hypertension Other          SOCIAL HISTORY  Social History     Socioeconomic History   • Marital status:      Spouse name: Not on file   • Number of children: 1   • Years of education: Not on file   • Highest education level: Not on file   Occupational History   • Occupation: [employer]     Employer: DISABLED     Comment: Disabled since -Back Problems   Tobacco Use   • Smoking status: Former Smoker     Packs/day: 1.00     Years: 10.00     Pack years: 10.00     Types: Cigarettes     Last attempt to quit: 1998     Years since quittin.6   • Smokeless tobacco: Former User     Types: Snuff      Quit date: 2017   Substance and Sexual Activity   • Alcohol use: Yes     Frequency: Monthly or less     Drinks per session: 3 or 4     Comment: 3 PER YEAR   • Drug use: No   • Sexual activity: Defer   Social History Narrative    Caffeine use: 0-1 serving daily.    Patient lives at home with wife.          ALLERGIES  Sulfa antibiotics; Invokana [canagliflozin]; Codeine; and Morphine        REVIEW OF SYSTEMS  Review of Systems   Per HPI  All systems reviewed and negative except for those discussed in HPI.       PHYSICAL EXAM    I have reviewed the triage vital signs and nursing notes.    ED Triage Vitals [08/03/20 1226]   Temp Heart Rate Resp BP SpO2   97.8 °F (36.6 °C) 81 22 (!) 80/52 97 %      Temp src Heart Rate Source Patient Position BP Location FiO2 (%)   Infrared Monitor Sitting Right arm --       Physical Exam  GENERAL: Resting in bed.  Alert.  Can fully conversant.  Well developed.  Appears in no acute distress.  HENT: Nares patent  EYES: No scleral icterus.  Extraocular motion is intact.  Patient does have a right lateral visual field deficit.  CV: Regular rhythm, regular rate  RESPIRATORY: Normal effort.  No audible wheezes, rales or rhonchi  ABDOMEN: Soft, nontender  MUSCULOSKELETAL: No deformities.   NEURO: Alert, moves all extremities, follows commands  SKIN: Warm, dry, no rash visualized        LAB RESULTS  Recent Results (from the past 24 hour(s))   Comprehensive Metabolic Panel    Collection Time: 08/03/20  1:37 PM   Result Value Ref Range    Glucose 153 (H) 65 - 99 mg/dL    BUN 13 6 - 20 mg/dL    Creatinine 1.69 (H) 0.76 - 1.27 mg/dL    Sodium 134 (L) 136 - 145 mmol/L    Potassium 4.0 3.5 - 5.2 mmol/L    Chloride 96 (L) 98 - 107 mmol/L    CO2 27.0 22.0 - 29.0 mmol/L    Calcium 9.5 8.6 - 10.5 mg/dL    Total Protein 7.3 6.0 - 8.5 g/dL    Albumin 4.20 3.50 - 5.20 g/dL    ALT (SGPT) 74 (H) 1 - 41 U/L    AST (SGOT) 76 (H) 1 - 40 U/L    Alkaline Phosphatase 101 39 - 117 U/L    Total Bilirubin 1.0 0.0 - 1.2  mg/dL    eGFR Non African Amer 42 (L) >60 mL/min/1.73    Globulin 3.1 gm/dL    A/G Ratio 1.4 g/dL    BUN/Creatinine Ratio 7.7 7.0 - 25.0    Anion Gap 11.0 5.0 - 15.0 mmol/L   BNP    Collection Time: 08/03/20  1:37 PM   Result Value Ref Range    proBNP 42.8 0.0 - 900.0 pg/mL   Troponin    Collection Time: 08/03/20  1:37 PM   Result Value Ref Range    Troponin T 0.021 0.000 - 0.030 ng/mL   Light Blue Top    Collection Time: 08/03/20  1:37 PM   Result Value Ref Range    Extra Tube hold for add-on    Green Top (Gel)    Collection Time: 08/03/20  1:37 PM   Result Value Ref Range    Extra Tube Hold for add-ons.    Lavender Top    Collection Time: 08/03/20  1:37 PM   Result Value Ref Range    Extra Tube hold for add-on    Gold Top - SST    Collection Time: 08/03/20  1:37 PM   Result Value Ref Range    Extra Tube Hold for add-ons.    CBC Auto Differential    Collection Time: 08/03/20  1:37 PM   Result Value Ref Range    WBC 8.74 3.40 - 10.80 10*3/mm3    RBC 5.40 4.14 - 5.80 10*6/mm3    Hemoglobin 14.1 13.0 - 17.7 g/dL    Hematocrit 43.1 37.5 - 51.0 %    MCV 79.8 79.0 - 97.0 fL    MCH 26.1 (L) 26.6 - 33.0 pg    MCHC 32.7 31.5 - 35.7 g/dL    RDW 14.6 12.3 - 15.4 %    RDW-SD 42.4 37.0 - 54.0 fl    MPV 9.9 6.0 - 12.0 fL    Platelets 254 140 - 450 10*3/mm3    Neutrophil % 78.7 (H) 42.7 - 76.0 %    Lymphocyte % 12.1 (L) 19.6 - 45.3 %    Monocyte % 4.5 (L) 5.0 - 12.0 %    Eosinophil % 2.3 0.3 - 6.2 %    Basophil % 0.6 0.0 - 1.5 %    Immature Grans % 1.8 (H) 0.0 - 0.5 %    Neutrophils, Absolute 6.88 1.70 - 7.00 10*3/mm3    Lymphocytes, Absolute 1.06 0.70 - 3.10 10*3/mm3    Monocytes, Absolute 0.39 0.10 - 0.90 10*3/mm3    Eosinophils, Absolute 0.20 0.00 - 0.40 10*3/mm3    Basophils, Absolute 0.05 0.00 - 0.20 10*3/mm3    Immature Grans, Absolute 0.16 (H) 0.00 - 0.05 10*3/mm3    nRBC 0.0 0.0 - 0.2 /100 WBC   TSH    Collection Time: 08/03/20  1:37 PM   Result Value Ref Range    TSH 3.120 0.270 - 4.200 uIU/mL   Procalcitonin     Collection Time: 08/03/20  1:37 PM   Result Value Ref Range    Procalcitonin 0.18 0.00 - 0.25 ng/mL   Lactic Acid, Plasma    Collection Time: 08/03/20  1:37 PM   Result Value Ref Range    Lactate 3.0 (C) 0.5 - 2.0 mmol/L   Lactic Acid, Reflex Timer (This will reflex a repeat order 3-3:15 hours after ordered.)    Collection Time: 08/03/20  1:37 PM   Result Value Ref Range    Hold Tube Hold for add-ons.    Respiratory Panel PCR w/COVID-19(SARS-CoV-2) [facility] In-House, NP Swab in [facility], 8-12 Hr TAT - Swab, Nasopharynx    Collection Time: 08/03/20  5:37 PM   Result Value Ref Range    ADENOVIRUS, PCR Not Detected Not Detected    Coronavirus 229E Not Detected Not Detected    Coronavirus HKU1 Not Detected Not Detected    Coronavirus NL63 Not Detected Not Detected    Coronavirus OC43 Not Detected Not Detected    COVID19 Not Detected Not Detected - Ref. Range    Human Metapneumovirus Not Detected Not Detected    Human Rhinovirus/Enterovirus Not Detected Not Detected    Influenza A PCR Not Detected Not Detected    Influenza A H1 Not Detected Not Detected    Influenza A H1 2009 PCR Not Detected Not Detected    Influenza A H3 Not Detected Not Detected    Influenza B PCR Not Detected Not Detected    Parainfluenza Virus 1 Not Detected Not Detected    Parainfluenza Virus 2 Not Detected Not Detected    Parainfluenza Virus 3 Not Detected Not Detected    Parainfluenza Virus 4 Not Detected Not Detected    RSV, PCR Not Detected Not Detected    Bordetella pertussis pcr Not Detected Not Detected    Bordetella parapertussis PCR Not Detected Not Detected    Chlamydophila pneumoniae PCR Not Detected Not Detected    Mycoplasma pneumo by PCR Not Detected Not Detected   Urinalysis With Culture If Indicated - Urine, Clean Catch    Collection Time: 08/03/20  5:42 PM   Result Value Ref Range    Color, UA Yellow Yellow, Straw    Appearance, UA Clear Clear    pH, UA <=5.0 5.0 - 8.0    Specific Gravity, UA 1.015 1.001 - 1.030    Glucose, UA  Negative Negative    Ketones, UA Negative Negative    Bilirubin, UA Negative Negative    Blood, UA Negative Negative    Protein, UA Negative Negative    Leuk Esterase, UA Negative Negative    Nitrite, UA Negative Negative    Urobilinogen, UA 1.0 E.U./dL 0.2 - 1.0 E.U./dL   Lactic Acid, Reflex    Collection Time: 08/03/20  9:08 PM   Result Value Ref Range    Lactate 2.0 0.5 - 2.0 mmol/L   POC Glucose Once    Collection Time: 08/03/20  9:29 PM   Result Value Ref Range    Glucose 306 (H) 70 - 130 mg/dL       Ordered the above labs and independently reviewed the results.        RADIOLOGY  Ct Angiogram Head    Result Date: 8/3/2020  EXAMINATION: CT ANGIOGRAM NECK-, CT ANGIOGRAM HEAD- 08/03/2020  INDICATION: dizzy, right lateral visual field deficit  TECHNIQUE: Axial arterial phase postcontrast CT of the head and neck with 3-D reconstructions.  The radiation dose reduction device was turned on for each scan per the ALARA (As Low as Reasonably Achievable) protocol.  COMPARISON: NONE  FINDINGS: The imaged lung apices are clear. The aortic arch is mildly atherosclerotic with great vessel origins patent. Bilateral carotid bifurcation/internal carotid artery origins are atherosclerotic with less than 25%, mild associated narrowing. Bilateral vertebral arteries are normal in course and caliber without evidence of significant stenosis, occlusion or aneurysmal dilatation.  Bilateral proximal intracranial internal carotid arteries are atherosclerotic with mild associated narrowing. Otherwise, bilateral anterior, middle and posterior cerebral arteries demonstrate minimal atherosclerotic change without significant associated narrowing. Bilateral proximal intracranial vertebral arteries are atherosclerotic without significant associated narrowing. The dural venous sinuses are relatively well-opacified and appear patent.      Minimal cervical carotid and intracranial atherosclerotic disease without significant associated narrowing. No  large vessel occlusion, high-grade stenosis or aneurysmal dilatation.  D:  08/03/2020 E:  08/03/2020  This report was finalized on 8/3/2020 5:26 PM by Amaury Fuentes.      Ct Head Without Contrast    Result Date: 8/3/2020  EXAMINATION: CT HEAD WO CONTRAST-  INDICATION: dizzy/lightheaded.  Right lateral visual field defect  TECHNIQUE: Axial noncontrast CT of the head with coronal reformats.  The radiation dose reduction device was turned on for each scan per the ALARA (As Low as Reasonably Achievable) protocol.  COMPARISON: October 15, 2019  FINDINGS: No acute hemorrhage, mass or mass effect. Gray-white differentiation appears grossly maintained without evidence of territorial ischemia on noncontrast CT head. Mild periventricular white matter changes of chronic small vessel ischemia and age appropriate volume loss. Normal ventricular size and configuration. Normal orbits. The paranasal sinuses are grossly clear. No acute calvarial abnormalities.      No acute intracranial abnormality.   This report was finalized on 8/3/2020 2:56 PM by Amaury Fuentes.      Ct Angiogram Neck    Result Date: 8/3/2020  EXAMINATION: CT ANGIOGRAM NECK-, CT ANGIOGRAM HEAD- 08/03/2020  INDICATION: dizzy, right lateral visual field deficit  TECHNIQUE: Axial arterial phase postcontrast CT of the head and neck with 3-D reconstructions.  The radiation dose reduction device was turned on for each scan per the ALARA (As Low as Reasonably Achievable) protocol.  COMPARISON: NONE  FINDINGS: The imaged lung apices are clear. The aortic arch is mildly atherosclerotic with great vessel origins patent. Bilateral carotid bifurcation/internal carotid artery origins are atherosclerotic with less than 25%, mild associated narrowing. Bilateral vertebral arteries are normal in course and caliber without evidence of significant stenosis, occlusion or aneurysmal dilatation.  Bilateral proximal intracranial internal carotid arteries are atherosclerotic with  mild associated narrowing. Otherwise, bilateral anterior, middle and posterior cerebral arteries demonstrate minimal atherosclerotic change without significant associated narrowing. Bilateral proximal intracranial vertebral arteries are atherosclerotic without significant associated narrowing. The dural venous sinuses are relatively well-opacified and appear patent.      Minimal cervical carotid and intracranial atherosclerotic disease without significant associated narrowing. No large vessel occlusion, high-grade stenosis or aneurysmal dilatation.  D:  08/03/2020 E:  08/03/2020  This report was finalized on 8/3/2020 5:26 PM by Amaury Fuentes.      Ct Chest Without Contrast    Result Date: 8/3/2020  EXAMINATION: CT CHEST WO CONTRAST-  INDICATION: hypotension, mild soa, abnormal CXR  TECHNIQUE: Axial noncontrast CT of the chest with multiplanar reformats  The radiation dose reduction device was turned on for each scan per the ALARA (As Low as Reasonably Achievable) protocol.  COMPARISON: October 28, 2017  FINDINGS: Multiple calcified thyroid nodules noted, partially imaged. Normal course and caliber of the thoracic esophagus. Atherosclerotic nonaneurysmal thoracic aorta. No bulky mediastinal or hilar adenopathy. Three-vessel calcified coronary atherosclerosis. Imaged upper abdomen is unremarkable. No aggressive soft tissue body wall or suspicious osseous findings. Lung windows demonstrate no evidence of active lung parenchymal infectious/inflammatory process or suspicious nodularity. No definite pleural effusion. No pneumothorax.      No acute process in the chest to account for dyspnea.  Coronary and aortic atherosclerosis as above.  Several small calcified thyroid nodules. These can be followed with nonemergent ultrasound as indicated if not already performed.   This report was finalized on 8/3/2020 5:14 PM by Amaury Fuentes.      Xr Chest 1 View    Result Date: 8/3/2020  EXAMINATION: XR CHEST 1 VW- 08/03/2020   INDICATION: SOA triage protocol  COMPARISON: November 30, 2019  FINDINGS: Stable degree of cardiac enlargement. Lung volumes remain diminished and there are vascular markings without overt edema. Probable trace left pleural effusion. No pneumothorax. No new focal consolidation.         Stable exam with low lung volumes, cardiomegaly, trace left pleural effusion and mild pulmonary vascular congestion without overt edema.  D:  08/03/2020 E:  08/03/2020  This report was finalized on 8/3/2020 5:24 PM by Amaury Fuentes.      Ct Cerebral Perfusion With & Without Contrast    Result Date: 8/3/2020  EXAMINATION: CT CEREBRAL PERFUSION W WO CONTRAST- 08/03/2020  INDICATION: ataxia, vision loss  TECHNIQUE: Cerebral perfusion analysis was performed using computed tomography with contrast administration, including post processing of parametric maps with determination of cerebral blood flow, cerebral blood volume, and mean transit time.  The radiation dose reduction device was turned on for each scan per the ALARA (As Low as Reasonably Achievable) protocol.  COMPARISON: NONE.  FINDINGS: Perfusion maps demonstrate symmetric blood volume, relative flow and mean transit time without evidence of core infarct or tissue at risk.      Perfusion maps demonstrate symmetric blood volume, relative flow and mean transit time without evidence of core infarct or tissue at risk.         I ordered and reviewed the above noted radiographic studies.    I viewed images of CT Head which showed No acute abnormality per my independent interpretation.  See radiologist's dictation for official interpretation.        PROCEDURES    Critical Care  Performed by: Braden Diaz DO  Authorized by: Braden Diaz DO     Critical care provider statement:     Critical care time (minutes):  35    Critical care time was exclusive of:  Separately billable procedures and treating other patients    Critical care was necessary to treat or prevent imminent or  life-threatening deterioration of the following conditions:  Circulatory failure and shock    Critical care was time spent personally by me on the following activities:  Development of treatment plan with patient or surrogate, evaluation of patient's response to treatment, examination of patient, obtaining history from patient or surrogate, ordering and review of laboratory studies, ordering and review of radiographic studies, ordering and performing treatments and interventions, pulse oximetry, re-evaluation of patient's condition and review of old charts    I assumed direction of critical care for this patient from another provider in my specialty: no            MEDICATIONS GIVEN IN ER    Medications   sodium chloride 0.9 % flush 10 mL (has no administration in time range)   albuterol sulfate HFA (PROVENTIL HFA;VENTOLIN HFA;PROAIR HFA) inhaler 2 puff (has no administration in time range)   amitriptyline (ELAVIL) tablet 75 mg (75 mg Oral Given 8/3/20 2132)   budesonide-formoterol (SYMBICORT) 80-4.5 MCG/ACT inhaler 2 puff ( Inhalation Canceled Entry 8/3/20 2130)   atorvastatin (LIPITOR) tablet 80 mg (80 mg Oral Given 8/3/20 2132)   aspirin EC tablet 81 mg (81 mg Oral Given 8/3/20 2134)   clopidogrel (PLAVIX) tablet 75 mg (75 mg Oral Given 8/3/20 2134)   fluticasone (FLONASE) 50 MCG/ACT nasal spray 1 spray (1 spray Each Nare Given 8/3/20 2155)   gabapentin (NEURONTIN) capsule 600 mg (600 mg Oral Given 8/3/20 2133)   cetirizine (zyrTEC) tablet 10 mg (10 mg Oral Given 8/3/20 2134)   pantoprazole (PROTONIX) EC tablet 40 mg (has no administration in time range)   zafirlukast (ACCOLATE) tablet 20 mg (20 mg Oral Given 8/3/20 2137)   Vortioxetine HBr tablet 20 mg(Trintellix) (PATIENT SUPPLIED) (20 mg Oral Given by Other 8/3/20 2144)   azelastine (ASTELIN) nasal spray 1 spray (has no administration in time range)   albuterol sulfate HFA (PROVENTIL HFA;VENTOLIN HFA;PROAIR HFA) inhaler 2 puff (2 puffs Inhalation Given 8/3/20  2023)   butalbital-acetaminophen-caffeine (FIORICET, ESGIC) -40 MG per tablet 1 tablet (has no administration in time range)   sodium chloride 0.9 % flush 10 mL (10 mL Intravenous Given 8/3/20 2135)   sodium chloride 0.9 % flush 10 mL (has no administration in time range)   heparin (porcine) 5000 UNIT/ML injection 5,000 Units (5,000 Units Subcutaneous Given 8/3/20 2135)   acetaminophen (TYLENOL) tablet 650 mg (has no administration in time range)     Or   acetaminophen (TYLENOL) 160 MG/5ML solution 650 mg (has no administration in time range)     Or   acetaminophen (TYLENOL) suppository 650 mg (has no administration in time range)   HYDROcodone-acetaminophen (NORCO) 5-325 MG per tablet 1 tablet (has no administration in time range)   calcium carbonate EX (TUMS EX) chewable tablet 750 mg (has no administration in time range)   docusate sodium (COLACE) capsule 100 mg (has no administration in time range)   dextrose (GLUTOSE) oral gel 15 g (has no administration in time range)   dextrose (D50W) 25 g/ 50mL Intravenous Solution 25 g (has no administration in time range)   glucagon (human recombinant) (GLUCAGEN DIAGNOSTIC) injection 1 mg (has no administration in time range)   insulin detemir (LEVEMIR) injection 10 Units (10 Units Subcutaneous Given 8/3/20 2139)   Bromocriptine Mesylate tablet 3.2 mg (Cycloset) (PATIENT SUPPLIED) (3.2 mg Oral Given by Other 8/3/20 2144)   insulin lispro (humaLOG) injection 0-9 Units (7 Units Subcutaneous Given 8/3/20 2155)   sodium chloride 0.9 % bolus 1,000 mL (0 mL Intravenous Stopped 8/3/20 1418)   sodium chloride 0.9 % bolus 1,000 mL (0 mL Intravenous Stopped 8/3/20 1600)   iopamidol (ISOVUE-370) 76 % injection 150 mL (120 mL Intravenous Given 8/3/20 1435)         PROGRESS, DATA ANALYSIS, CONSULTS, AND MEDICAL DECISION MAKING    All labs have been independently reviewed by me.  All radiology studies have been reviewed by me and the radiologist dictating the report.   EKG's have  been independently viewed and interpreted by me.                 AS OF 00:06 VITALS:    BP - 109/51  HR - 98  TEMP - 97.2 °F (36.2 °C) (Oral)  O2 SATS - 97%        DIAGNOSIS  Final diagnoses:   Hypotension, unspecified hypotension type   Near syncope   Lightheadedness   Seizure-like activity (CMS/HCC)         DISPOSITION  Admit           Braden Diaz DO  08/04/20 0009

## 2020-08-03 NOTE — PAYOR COMM NOTE
"Denzel Harding (60 y.o. Male)     Date of Birth Social Security Number Address Home Phone MRN    1960  [address] [phone] [id number]    Sabianism Marital Status          Baptism        Admission Date Admission Type Admitting Provider Attending Provider Department, Room/Bed    8/3/20 Emergency Melisa Briceno DO Roesch, Lyndsey, DO [facility] 6B, N642/1    Discharge Date Discharge Disposition Discharge Destination                       Attending Provider:  Melisa Briceno DO    Allergies:  Sulfa Antibiotics, Invokana [Canagliflozin], Codeine, Morphine    Isolation:  Contact Air   Infection:  COVID (rule out) (20)   Code Status:  CPR    Ht:  177.8 cm (70\")   Wt:  136 kg (300 lb)    Admission Cmt:  None   Principal Problem:  None                Active Insurance as of 8/3/2020     Primary Coverage     Payor Plan Insurance Group Employer/Plan Group    ANTH MEDICARE REPLACEMENT [facility] MEDICARE ADVANTAGE [id number]     Payor Plan Address Payor Plan Phone Number Payor Plan Fax Number Effective Dates    [address] [phone]  2016 - None Entered    [facility] [id number]       Subscriber Name Subscriber Birth Date Member ID       DENZEL HARDING 1960 [id number]                 Emergency Contacts      (Rel.) Home Phone Work Phone Mobile Phone    Carol Harding (Spouse) -- -- [phone]            Emergency Contact Information     Name Relation Home Work Mobile    Carol Harding Spouse   [phone]             History & Physical      Melisa Briceno DO at 20 1740              [facility] Medicine Services  HISTORY AND PHYSICAL    Patient Name: Denzel Harding  : 1960  MRN: [id number]  Primary Care Physician: Isaura Godoy MD  Date of admission: 8/3/2020      Subjective   Subjective     Chief Complaint:  Pre-syncope     HPI:  Denzel Harding is a 60 y.o. male with history " of DM, CHF, CKD 3, depression, h/o CVA, HTN, asthma, CAD s/p CABG who presents today with an episode of pre-syncope. Wife helps provide history because he does not remember details of the episode. States he was sitting down eating breakfast and felt like he was going to pass out. Remembers his wife asking him the president and year. Wife states his left arm was shaking, he had a weak smile bilaterally, nauseated with then an episode of vomiting. Wife took him to the ED where BP was noted to be low. CVA workup with CT perfusion negative. Patient states that he did take his BP mediations prior to episode. Denies recent illness or change in medications.     SOA with his asthma and with anxiety during episode today, Denies exposure to COVID. Denies new or change in cough or change in taste/smell.     Review of Systems   Gen- No fevers, chills  CV- No chest pain, palpitations  Resp- No cough, + dyspnea  GI- No N/V/D, abd pain    All other systems reviewed and are negative.     Personal History     Past Medical History:   Diagnosis Date   • Anxiety    • Arthritis    • Asthma    • Brachial neuritis 1/19/2017   • Cellulitis     left arm and right leg    • Chest pain    • CHF (congestive heart failure) (CMS/[facility])     Patient reported history May 2020    • COPD (chronic obstructive pulmonary disease) (CMS/[facility])    • Coronary artery disease     Patient reported CABG , 3 vessel   • Depression    • Diabetes mellitus (CMS/[facility])     diagnosed in 1998, checks fsbg 3-4x/day   • Dizziness    • Edema    • Elevated cholesterol    • GERD (gastroesophageal reflux disease)    • H/O chest x-ray 07/04/2015    Mild Left base atelectasis   • H/O echocardiogram 07/05/2015    Normal LVSF. EF of 60-65%. Grade 1 diastolic dysfunction of the LV myocardium. No evidence of pericardial effusion   • H/O exercise stress test    • Hearing loss     No use of hearing aids   • History of fracture     Reported 2 bones in left foot and a finger   •  History of herniated intervertebral disc     History of left L5-S1 disc herniation   • History of pneumonia    • History of pneumonia    • Hypertension    • LOM (loss of memory) 1/19/2017   • Lower back pain     Right   • Measles    • Neuropathy     feet    • EDD treated with BiPAP     BiPAP HS - instructed to bring mask/machine DOS (setting 13 and 5)   • Palpitations    • Pericardial effusion    • Poor dentition     Patient reported missing multiple teeth   • Renal disorder    • Seizure disorder (CMS/HCC)    • Seizures (CMS/HCC)     FOCAL last 2009   • Shortness of breath 1/19/2017   • SOB (shortness of breath)    • Staph infection     back after sugery at the incision site    • Stroke (CMS/HCC)     x2 most recent , slight weakness in hands occasionaly    • Wears glasses        Past Surgical History:   Procedure Laterality Date   • BACK SURGERY     • CARDIAC CATHETERIZATION     • CARDIAC CATHETERIZATION N/A 8/11/2017    Procedure: Coronary angiography;  Surgeon: Dallas Kim MD;  Location: [facility] CATH INVASIVE LOCATION;  Service:    • CARDIAC CATHETERIZATION N/A 8/11/2017    Procedure: Left Heart Cath;  Surgeon: Dallas Kim MD;  Location: [facility] CATH INVASIVE LOCATION;  Service:    • CARDIAC CATHETERIZATION N/A 8/11/2017    Procedure: Left ventriculography;  Surgeon: Dallas Kim MD;  Location: [facility] CATH INVASIVE LOCATION;  Service:    • CARDIAC CATHETERIZATION      2002 x1 stent,  x1 stent   • CARDIOVASCULAR STRESS TEST  07/03/2017    WITH DR KIM AT [facility]   • CARPAL TUNNEL RELEASE Right    • CATARACT EXTRACTION W/ INTRAOCULAR LENS IMPLANT Right 6/12/2017    Procedure: CATARACT PHACO EXTRACTION WITH INTRAOCULAR LENS IMPLANT RIGHT ;  Surgeon: Natalie Cao MD;  Location: [facility];  Service:    • CATARACT EXTRACTION W/ INTRAOCULAR LENS IMPLANT Left 7/10/2017    Procedure: CATARACT PHACO EXTRACTION WITH INTRAOCULAR LENS IMPLANT LEFT;  Surgeon: Natalie Cao MD;  Location: [facility];   Service:    • CHOLECYSTECTOMY     • COLONOSCOPY     • COLONOSCOPY N/A 7/2/2020    Procedure: COLONOSCOPY;  Surgeon: Petra Crowell MD;  Location: [facility] ENDOSCOPY;  Service: Gastroenterology;  Laterality: N/A;  poor prep   • CORONARY ANGIOPLASTY WITH STENT PLACEMENT      X1-LAD   • CORONARY ARTERY BYPASS GRAFT N/A 8/15/2017    Procedure: CORONARY ARTERY BYPASS GRAFTING x 3 UTILIZING THE LEFT INTERNAL MAMMARY ARTERY WITH ENDOSCOPIC VEIN HARVESTING OF THE RIGHT GREATER SAPHENOUS VEIN, HAMLET, LAD ENDARECTOMY;  Surgeon: London Damon MD;  Location: [facility] OR;  Service:    • ENDOSCOPY     • EYE CAPSULOTOMY WITH LASER Right 11/25/2019    Procedure: EYE CAPSULOTOMY WITH LASER RIGHT;  Surgeon: Natalie Cao MD;  Location: [facility] OR;  Service: Ophthalmology   • EYE CAPSULOTOMY WITH LASER Left 12/9/2019    Procedure: EYE CAPSULOTOMY WITH LASER LEFT;  Surgeon: Natalie Cao MD;  Location: [facility] OR;  Service: Ophthalmology   • EYE SURGERY      cataract surgery both eyes   • INTERVENTIONAL RADIOLOGY PROCEDURE Bilateral 12/31/2019    Procedure: Carotid Cerebral Angiogram;  Surgeon: Damian Dubois MD;  Location: [facility] CATH INVASIVE LOCATION;  Service: Interventional Radiology   • KNEE ARTHROSCOPY Bilateral    • KNEE ARTHROSCOPY Right 2010    Dr Lewis   • KNEE ARTHROSCOPY Left 2008    Dr Jameson   • MOUTH SURGERY      Oral extractions   • NEUROPLASTY / TRANSPOSITION ULNAR NERVE AT ELBOW Left    • OTHER SURGICAL HISTORY      Foraminotomy and discectomy   • PERICARDIAL WINDOW N/A 8/25/2017    Procedure: PERICARDIAL WINDOW;  Surgeon: Freeman Phillips MD;  Location: [facility] OR;  Service:        Family History: family history includes Alcohol abuse in his father; Arthritis in his mother; Heart disease in some other family members; Hypertension in his mother and other family members; Other in an other family member; Parkinsonism in an other family member; Stroke in some other family members. Otherwise pertinent FHx was  reviewed and unremarkable.     Social History:  reports that he quit smoking about 22 years ago. His smoking use included cigarettes. He has a 10.00 pack-year smoking history. He quit smokeless tobacco use about 3 years ago.  His smokeless tobacco use included snuff. He reports that he drinks alcohol. He reports that he does not use drugs.  Social History     Social History Narrative    Caffeine use: 0-1 serving daily.    Patient lives at home with wife.        Medications:  Available home medication information reviewed.    (Not in a hospital admission)    Allergies   Allergen Reactions   • Sulfa Antibiotics Anaphylaxis, Nausea And Vomiting and Delirium   • Invokana [Canagliflozin] Unknown - Low Severity     DKA   • Codeine Nausea And Vomiting     Can only take with Phenergan   • Morphine Unknown - Low Severity     Does not work. It causes pain       Objective   Objective     Vital Signs:   Temp:  [97.8 °F (36.6 °C)] 97.8 °F (36.6 °C)  Heart Rate:  [80-90] 85  Resp:  [16-22] 16  BP: ()/(48-78) 113/68   Total (NIH Stroke Scale): 2    Physical Exam   Constitutional: Awake, alert  Eyes: PERRLA, sclerae anicteric, no conjunctival injection  HENT: NCAT, mucous membranes moist  Neck: Supple, no thyromegaly, no lymphadenopathy, trachea midline  Respiratory: Clear to auscultation bilaterally, nonlabored respirations - no wheeze/rales   Cardiovascular: RRR, no murmurs, rubs, or gallops, palpable pedal pulses bilaterally  Gastrointestinal: Positive bowel sounds, soft, nontender, nondistended  Musculoskeletal: trace bilateral ankle edema, no clubbing or cyanosis to extremities  Psychiatric: Appropriate affect, cooperative  Neurologic: Oriented x 3, strength symmetric in all extremities, Cranial Nerves grossly intact to confrontation, speech clear  Skin: No rashes      Results Reviewed:  I have personally reviewed current lab and radiology data.    Results from last 7 days   Lab Units 08/03/20  1337   WBC 10*3/mm3 8.74      HEMOGLOBIN g/dL 14.1   HEMATOCRIT % 43.1   PLATELETS 10*3/mm3 254     Results from last 7 days   Lab Units 08/03/20  1337   SODIUM mmol/L 134*   POTASSIUM mmol/L 4.0   CHLORIDE mmol/L 96*   CO2 mmol/L 27.0   BUN mg/dL 13   CREATININE mg/dL 1.69*   GLUCOSE mg/dL 153*   CALCIUM mg/dL 9.5   ALT (SGPT) U/L 74*   AST (SGOT) U/L 76*   TROPONIN T ng/mL 0.021   PROBNP pg/mL 42.8   LACTATE mmol/L 3.0*   PROCALCITONIN ng/mL 0.18     Estimated Creatinine Clearance: 65.4 mL/min (A) (by C-G formula based on SCr of 1.69 mg/dL (H)).  Brief Urine Lab Results  (Last result in the past 365 days)      Color   Clarity   Blood   Leuk Est   Nitrite   Protein   CREAT   Urine HCG        07/08/20 1541 Dark Yellow Clear Negative Trace Negative Negative             Imaging Results (Last 24 Hours)     Procedure Component Value Units Date/Time    CT Angiogram Neck [[id number]] Collected:  08/03/20 1456     Updated:  08/03/20 1730    Narrative:       EXAMINATION: CT ANGIOGRAM NECK-, CT ANGIOGRAM HEAD- 08/03/2020     INDICATION: dizzy, right lateral visual field deficit     TECHNIQUE: Axial arterial phase postcontrast CT of the head and neck  with 3-D reconstructions.     The radiation dose reduction device was turned on for each scan per the  ALARA (As Low as Reasonably Achievable) protocol.     COMPARISON: NONE     FINDINGS: The imaged lung apices are clear. The aortic arch is mildly  atherosclerotic with great vessel origins patent. Bilateral carotid  bifurcation/internal carotid artery origins are atherosclerotic with  less than 25%, mild associated narrowing. Bilateral vertebral arteries  are normal in course and caliber without evidence of significant  stenosis, occlusion or aneurysmal dilatation.     Bilateral proximal intracranial internal carotid arteries are  atherosclerotic with mild associated narrowing. Otherwise, bilateral  anterior, middle and posterior cerebral arteries demonstrate minimal  atherosclerotic change without  significant associated narrowing.  Bilateral proximal intracranial vertebral arteries are atherosclerotic  without significant associated narrowing. The dural venous sinuses are  relatively well-opacified and appear patent.       Impression:       Minimal cervical carotid and intracranial atherosclerotic  disease without significant associated narrowing. No large vessel  occlusion, high-grade stenosis or aneurysmal dilatation.     D:  08/03/2020  E:  08/03/2020     This report was finalized on 8/3/2020 5:26 PM by Amaury Fuentes.       CT Angiogram Head [[id number]] Collected:  08/03/20 1456     Updated:  08/03/20 1729    Narrative:       EXAMINATION: CT ANGIOGRAM NECK-, CT ANGIOGRAM HEAD- 08/03/2020     INDICATION: dizzy, right lateral visual field deficit     TECHNIQUE: Axial arterial phase postcontrast CT of the head and neck  with 3-D reconstructions.     The radiation dose reduction device was turned on for each scan per the  ALARA (As Low as Reasonably Achievable) protocol.     COMPARISON: NONE     FINDINGS: The imaged lung apices are clear. The aortic arch is mildly  atherosclerotic with great vessel origins patent. Bilateral carotid  bifurcation/internal carotid artery origins are atherosclerotic with  less than 25%, mild associated narrowing. Bilateral vertebral arteries  are normal in course and caliber without evidence of significant  stenosis, occlusion or aneurysmal dilatation.     Bilateral proximal intracranial internal carotid arteries are  atherosclerotic with mild associated narrowing. Otherwise, bilateral  anterior, middle and posterior cerebral arteries demonstrate minimal  atherosclerotic change without significant associated narrowing.  Bilateral proximal intracranial vertebral arteries are atherosclerotic  without significant associated narrowing. The dural venous sinuses are  relatively well-opacified and appear patent.       Impression:       Minimal cervical carotid and intracranial  atherosclerotic  disease without significant associated narrowing. No large vessel  occlusion, high-grade stenosis or aneurysmal dilatation.     D:  08/03/2020  E:  08/03/2020     This report was finalized on 8/3/2020 5:26 PM by Amaury Fuentes.       XR Chest 1 View [[id number]] Collected:  08/03/20 1508     Updated:  08/03/20 1727    Narrative:       EXAMINATION: XR CHEST 1 VW- 08/03/2020     INDICATION: SOA triage protocol      COMPARISON: November 30, 2019     FINDINGS: Stable degree of cardiac enlargement. Lung volumes remain  diminished and there are vascular markings without overt edema. Probable  trace left pleural effusion. No pneumothorax. No new focal  consolidation.           Impression:       Stable exam with low lung volumes, cardiomegaly, trace left  pleural effusion and mild pulmonary vascular congestion without overt  edema.     D:  08/03/2020  E:  08/03/2020     This report was finalized on 8/3/2020 5:24 PM by Amaury Fuentes.       CT Chest Without Contrast [[id number]] Collected:  08/03/20 1710     Updated:  08/03/20 1717    Narrative:       EXAMINATION: CT CHEST WO CONTRAST-      INDICATION: hypotension, mild soa, abnormal CXR     TECHNIQUE: Axial noncontrast CT of the chest with multiplanar reformats     The radiation dose reduction device was turned on for each scan per the  ALARA (As Low as Reasonably Achievable) protocol.     COMPARISON: October 28, 2017     FINDINGS: Multiple calcified thyroid nodules noted, partially imaged.  Normal course and caliber of the thoracic esophagus. Atherosclerotic  nonaneurysmal thoracic aorta. No bulky mediastinal or hilar adenopathy.  Three-vessel calcified coronary atherosclerosis. Imaged upper abdomen is  unremarkable. No aggressive soft tissue body wall or suspicious osseous  findings. Lung windows demonstrate no evidence of active lung  parenchymal infectious/inflammatory process or suspicious nodularity. No  definite pleural effusion. No pneumothorax.        Impression:       No acute process in the chest to account for dyspnea.     Coronary and aortic atherosclerosis as above.     Several small calcified thyroid nodules. These can be followed with  nonemergent ultrasound as indicated if not already performed.        This report was finalized on 8/3/2020 5:14 PM by Amaury Fuentes.       CT Cerebral Perfusion With & Without Contrast [[id number]] Collected:  08/03/20 1519     Updated:  08/03/20 1521    Narrative:       EXAMINATION: CT CEREBRAL PERFUSION W WO CONTRAST- 08/03/2020     INDICATION: ataxia, vision loss     TECHNIQUE: Cerebral perfusion analysis was performed using computed  tomography with contrast administration, including post processing of  parametric maps with determination of cerebral blood flow, cerebral  blood volume, and mean transit time.     The radiation dose reduction device was turned on for each scan per the  ALARA (As Low as Reasonably Achievable) protocol.     COMPARISON: NONE.     FINDINGS: Perfusion maps demonstrate symmetric blood volume, relative  flow and mean transit time without evidence of core infarct or tissue at  risk.       Impression:       Perfusion maps demonstrate symmetric blood volume, relative  flow and mean transit time without evidence of core infarct or tissue at  risk.          CT Head Without Contrast [[id number]] Collected:  08/03/20 1453     Updated:  08/03/20 1501    Narrative:       EXAMINATION: CT HEAD WO CONTRAST-      INDICATION: dizzy/lightheaded.  Right lateral visual field defect     TECHNIQUE: Axial noncontrast CT of the head with coronal reformats.     The radiation dose reduction device was turned on for each scan per the  ALARA (As Low as Reasonably Achievable) protocol.     COMPARISON: October 15, 2019     FINDINGS: No acute hemorrhage, mass or mass effect. Gray-white  differentiation appears grossly maintained without evidence of  territorial ischemia on noncontrast CT head. Mild periventricular  white  matter changes of chronic small vessel ischemia and age appropriate  volume loss. Normal ventricular size and configuration. Normal orbits.  The paranasal sinuses are grossly clear. No acute calvarial  abnormalities.       Impression:       No acute intracranial abnormality.        This report was finalized on 8/3/2020 2:56 PM by Amaury Fuentes.           Results for orders placed during the hospital encounter of 05/22/19   Adult Transthoracic Echo Complete W/ Cont if Necessary Per Protocol    Narrative Technically very limited study   1) Mild LVH with normal LV systolic function ( EF is over 55%)  2) Mild left atrial enlargement   3) Trace MR and TR   4) Normal RV function        Assessment/Plan   Assessment & Plan     Active Hospital Problems    Diagnosis POA   • Pre-syncope [R55] Yes   • Lactic acidosis [E87.2] Unknown   • Chronic diastolic CHF (congestive heart failure) (CMS/[facility]) [I50.32] Yes   • COPD (chronic obstructive pulmonary disease) (CMS/[facility]) [J44.9] Yes   • JUAN (acute kidney injury) (CMS/HCC) [N17.9] Unknown   • Asthma [J45.909] Yes   • Obstructive sleep apnea syndrome [G47.33] Yes   • Chronic intractable headache [R51] Yes   • CVA (S/P tPA) [I63.9] Yes   • History of CVA (cerebrovascular accident) [Z86.73] Not Applicable   • Hypertension [I10] Yes   • Diabetes mellitus (CMS/[facility]) [E11.9] Yes   • Diabetic polyneuropathy associated with type 2 diabetes mellitus (CMS/[facility]) [E11.42] Yes       Denzel Harding is a 60 y.o. male with history of DM, CHF, CKD 3, depression, h/o CVA, HTN, asthma, CAD s/p CABG who presents today with an episode of pre-syncope.    Pre-syncope   AMS   - Likely related to medications/hypotension, however history of CVA and TIA and questionable seizures   - CT perfusion with no LVO   - Obtain MRI brain  - Obtain AM EEG   - Infectious workup pending   - Hold BP medications  - COVID rule out   - PT/OT  - Consider neuro pending workup     Hypotension  H/O hypertension   - low BP  on arrival s/p 2L fluids   - Holding home meds - lisinopril, amlodipine, aldactone, imdur     JUAN   - Cr 1.69; elevated in June 1.4 but 5/20 with normal baseline   - Holding lisinopril, bumex and aldactone   - s/p fluids; monitor     Lactic acidosis   - reflex pending; s/p fluids     CAD s/p CABG   - continue ASA, statin, plavix     Asthma   COPD  - continue symbicort; currently holding home nebulizer - resume once ruled out   - COVID rule out     DM   - holding home meds  - AM A1C   - levemir 10 units; SSI - adjust PRN     Mood  - Continue home trintellix, elavil       DVT prophylaxis:  Heparin     CODE STATUS:  Discussed with patient and wife - full code   Code Status and Medical Interventions:   Ordered at: 08/03/20 1612     Level Of Support Discussed With:    Patient     Code Status:    CPR     Medical Interventions (Level of Support Prior to Arrest):    Full       Admission Status:  I believe this patient meets INPATIENT status due to hypotension, lactic acidosis, need for fluid and covid rule out .  I feel patient’s risk for adverse outcomes and need for care warrant INPATIENT evaluation and I predict the patient’s care encounter to likely last beyond 2 midnights.      Electronically signed by Melisa Briceno DO, 08/03/20, 5:40 PM.      Electronically signed by Melisa Briceno DO at 08/03/20 1856       Emergency Department Notes    No notes of this type exist for this encounter.           Facility-Administered Medications as of 8/3/2020   Medication Dose Route Frequency Provider Last Rate Last Dose   • acetaminophen (TYLENOL) tablet 650 mg  650 mg Oral Q4H PRN Melisa Briceno DO        Or   • acetaminophen (TYLENOL) 160 MG/5ML solution 650 mg  650 mg Oral Q4H PRN Melisa Briceno DO        Or   • acetaminophen (TYLENOL) suppository 650 mg  650 mg Rectal Q4H PRN Melisa Briceno DO       • albuterol sulfate HFA (PROVENTIL HFA;VENTOLIN HFA;PROAIR HFA) inhaler 2 puff  2 puff Inhalation Q4H PRN Melisa Briceno,  DO       • albuterol sulfate HFA (PROVENTIL HFA;VENTOLIN HFA;PROAIR HFA) inhaler 2 puff  2 puff Inhalation 4x Daily - RT Melisa Briceno, DO       • amitriptyline (ELAVIL) tablet 75 mg  75 mg Oral Nightly Melisa Briceno, DO       • aspirin EC tablet 81 mg  81 mg Oral Daily Melisa Briceno, DO       • atorvastatin (LIPITOR) tablet 80 mg  80 mg Oral Nightly Melisa Briceno, DO       • azelastine (ASTELIN) nasal spray 1 spray  1 spray Each Nare BID PRN Melisa Briceno, DO       • Bromocriptine Mesylate tablet 3.2 mg (Cycloset) (PATIENT SUPPLIED)  3.2 mg Oral Daily Melisa Briceno, DO       • budesonide-formoterol (SYMBICORT) 80-4.5 MCG/ACT inhaler 2 puff  2 puff Inhalation BID - RT Melisa Briceno, DO       • butalbital-acetaminophen-caffeine (FIORICET, ESGIC) -40 MG per tablet 1 tablet  1 tablet Oral Q6H PRN Melisa Briceno, DO       • calcium carbonate EX (TUMS EX) chewable tablet 750 mg  750 mg Oral BID PRN Melisa Briceno, DO       • cetirizine (zyrTEC) tablet 10 mg  10 mg Oral Daily Melisa Briceno, DO       • clopidogrel (PLAVIX) tablet 75 mg  75 mg Oral Daily Melisa Briceno, DO       • dextrose (D50W) 25 g/ 50mL Intravenous Solution 25 g  25 g Intravenous Q15 Min PRN Melisa Briceno, DO       • dextrose (GLUTOSE) oral gel 15 g  15 g Oral Q15 Min PRN Melisa Briceno, DO       • docusate sodium (COLACE) capsule 100 mg  100 mg Oral BID PRN Melisa Briceno, DO       • fluticasone (FLONASE) 50 MCG/ACT nasal spray 1 spray  1 spray Each Nare Daily Melisa Briceno, DO       • gabapentin (NEURONTIN) capsule 600 mg  600 mg Oral Q12H Melisa Briceno, DO       • glucagon (human recombinant) (GLUCAGEN DIAGNOSTIC) injection 1 mg  1 mg Subcutaneous Q15 Min PRN Melisa Briceno, DO       • heparin (porcine) 5000 UNIT/ML injection 5,000 Units  5,000 Units Subcutaneous Q8H Kaity, Melisa, DO       • HYDROcodone-acetaminophen (NORCO) 5-325 MG per tablet 1 tablet  1 tablet Oral Q4H PRN Kaity, Melisa, DO       • insulin  detemir (LEVEMIR) injection 10 Units  10 Units Subcutaneous Nightly Melisa Briceno DO       • insulin lispro (humaLOG) injection 0-9 Units  0-9 Units Subcutaneous TID AC Melisa Briceno DO       • [COMPLETED] iopamidol (ISOVUE-370) 76 % injection 150 mL  150 mL Intravenous Once in imaging [place]leet, Braden, DO   120 mL at 08/03/20 1435   • [START ON 8/4/2020] pantoprazole (PROTONIX) EC tablet 40 mg  40 mg Oral QAM Melisa Briceno DO       • [COMPLETED] sodium chloride 0.9 % bolus 1,000 mL  1,000 mL Intravenous Once [place]et, Braden, DO   Stopped at 08/03/20 1418   • [COMPLETED] sodium chloride 0.9 % bolus 1,000 mL  1,000 mL Intravenous Once [place]et, Braden, DO   Stopped at 08/03/20 1600   • sodium chloride 0.9 % flush 10 mL  10 mL Intravenous PRN Melisa Briceno DO       • sodium chloride 0.9 % flush 10 mL  10 mL Intravenous Q12H Melisa Briceno DO       • sodium chloride 0.9 % flush 10 mL  10 mL Intravenous PRN Melisa Briceno DO       • Vortioxetine HBr tablet 20 mg(Trintellix) (PATIENT SUPPLIED)  20 mg Oral Daily Melisa Briceno DO       • zafirlukast (ACCOLATE) tablet 20 mg  20 mg Oral BID Melisa Briceno DO         Orders (last 24 hrs)      Start     Ordered    08/04/20 2000  insulin lispro (humaLOG) injection 0-9 Units  3 Times Daily Before Meals,   Status:  Discontinued      08/03/20 1857 08/04/20 0700  pantoprazole (PROTONIX) EC tablet 40 mg  Every Morning      08/03/20 1830 08/04/20 0600  Comprehensive Metabolic Panel  Morning Draw      08/03/20 1830 08/04/20 0600  CBC & Differential  Morning Draw      08/03/20 1830 08/04/20 0600  Magnesium  Morning Draw      08/03/20 1830 08/04/20 0600  EEG  Once      08/03/20 1901 08/04/20 0600  Hemoglobin A1c  Morning Draw      08/03/20 1901 08/04/20 0600  EEG  Once      08/03/20 1852 08/04/20 0600  Hemoglobin A1c  Morning Draw      08/03/20 1852 08/03/20 2200  heparin (porcine) 5000 UNIT/ML injection 5,000 Units  Every 8 Hours Scheduled       08/03/20 1830 08/03/20 2200  POC Glucose 4x Daily AC & at Bedtime  4 Times Daily Before Meals & at Bedtime      08/03/20 1830 08/03/20 2130  budesonide-formoterol (SYMBICORT) 80-4.5 MCG/ACT inhaler 2 puff  2 Times Daily - RT      08/03/20 1830 08/03/20 2100  amitriptyline (ELAVIL) tablet 75 mg  Nightly      08/03/20 1830 08/03/20 2100  atorvastatin (LIPITOR) tablet 80 mg  Nightly      08/03/20 1830 08/03/20 2100  gabapentin (NEURONTIN) capsule 600 mg  Every 12 Hours Scheduled      08/03/20 1830 08/03/20 2100  zafirlukast (ACCOLATE) tablet 20 mg  2 Times Daily      08/03/20 1830 08/03/20 2100  sodium chloride 0.9 % flush 10 mL  Every 12 Hours Scheduled      08/03/20 1830 08/03/20 2100  insulin detemir (LEVEMIR) injection 10 Units  Nightly      08/03/20 1830 08/03/20 2030  albuterol sulfate HFA (PROVENTIL HFA;VENTOLIN HFA;PROAIR HFA) inhaler 2 puff  4 Times Daily - RT      08/03/20 1830 08/03/20 2000  Bromocriptine Mesylate tablet 3.2 mg (PATIENT SUPPLIED)  Daily,   Status:  Discontinued      08/03/20 1830 08/03/20 2000  Vortioxetine HBr tablet 20 mg(Trintellix) (PATIENT SUPPLIED)  Daily      08/03/20 1830 08/03/20 2000  Vital Signs  Every 4 Hours      08/03/20 1830 08/03/20 2000  Bromocriptine Mesylate tablet 3.2 mg (Cycloset) (PATIENT SUPPLIED)  Daily      08/03/20 1840 08/03/20 2000  insulin lispro (humaLOG) injection 0-9 Units  3 Times Daily Before Meals      08/03/20 1902 08/03/20 1859  Diet Regular; Consistent Carbohydrate  Diet Effective Now      08/03/20 1858 08/03/20 1832  aspirin EC tablet 81 mg  Daily      08/03/20 1830 08/03/20 1832  clopidogrel (PLAVIX) tablet 75 mg  Daily      08/03/20 1830 08/03/20 1832  fluticasone (FLONASE) 50 MCG/ACT nasal spray 1 spray  Daily      08/03/20 1830 08/03/20 1832  cetirizine (zyrTEC) tablet 10 mg  Daily      08/03/20 1830 08/03/20 1832  insulin lispro (humaLOG) injection 0-7 Units  3 Times Daily Before Meals,    Status:  Discontinued      08/03/20 1830 08/03/20 1831  Intake & Output  Every Shift      08/03/20 1830 08/03/20 1831  Weigh Patient  Once      08/03/20 1830 08/03/20 1831  Oxygen Therapy- Nasal Cannula; Titrate for SPO2: 90% - 95%  Continuous      08/03/20 1830 08/03/20 1831  Insert Peripheral IV  Once      08/03/20 1830 08/03/20 1831  Saline Lock & Maintain IV Access  Continuous      08/03/20 1830 08/03/20 1831  PT Consult: Eval & Treat Discharge Placement Assessment  Once      08/03/20 1830 08/03/20 1831  OT Consult: Eval & Treat  Once      08/03/20 1830 08/03/20 1831  MRI Brain Without Contrast  1 Time Imaging      08/03/20 1830 08/03/20 1831  Do NOT Hold Basal or Correction Scale Insulin When Patient is NPO, Hold Scheduled Mealtime (Bolus) Insulin if NPO  Continuous      08/03/20 1830 08/03/20 1831  Follow [facility] Hypoglycemia Standing Orders For Blood Glucose Less Than 70 mg/dL  Until Discontinued     Comments:  ALERT PATIENT - NOT NPO & CAN SAFELY SWALLOW  Administer 4 oz Fruit Juice OR 4 oz Regular Soda OR 8 oz Milk OR 15-30 grams (1 tube) of Glucose Gel.  Recheck Blood Glucose Approximately 15 Minutes After Ingestion, Repeat Treatment & Continue to Recheck Blood Sugar Approximately Every 15 Minutes Until Blood Glucose is 70 or Higher.  Once Blood Glucose is 70 or Higher & if It Will Be More Than 60 Minutes Until Next Meal, Provide Appropriate Snack (Including Carbohydrate Food) Based on Meal Plan Order. Give Meal Tray As Soon As Possible.    PATIENT HAS IV ACCESS - UNRESPONSIVE, NPO OR UNABLE TO SAFELY SWALLOW  Administer 25g (50ml) D50W IV Push.  Recheck Blood Glucose Approximately 15 Minutes After Administration, if Blood Glucose Remains Less Than 70, Repeat Treatment   Recheck Blood Glucose Approximately 15 Minutes After 2nd Administration, if Blood Glucose Remains Less Than 70 After 2nd Dose of D50W, Contact Provider for Further Treatment Orders & Consider Adding IVF With D5W  for Maintenance    PATIENT WITHOUT IV ACCESS - UNRESPONSIVE, NPO OR UNABLE TO SAFELY SWALLOW  Administer 1mg Glucagon SQ & Establish IV Access.  Turn Patient on Side - Nausea / Vomiting May Occur.  Recheck Blood Glucose Approximately 15 Minutes After Administration.  If Blood Glucose Remains Less Than 70, Administer 25g D50W IV Push (50ml).  Recheck Blood Glucose Approximately 15 Minutes After Administration of D50W, if Blood Glucose Remains Less Than 70, Contact Provider for Further Treatment Orders & Consider Adding IVF With D5 for Maintenance    Document Event & Patient Response to Interventions in EMR, Document Medications on MAR  Notify Provider if Hypoglycemia Treatment Needed    08/03/20 1830    08/03/20 1830  acetaminophen (TYLENOL) tablet 650 mg  Every 4 Hours PRN      08/03/20 1830    08/03/20 1830  acetaminophen (TYLENOL) 160 MG/5ML solution 650 mg  Every 4 Hours PRN      08/03/20 1830    08/03/20 1830  acetaminophen (TYLENOL) suppository 650 mg  Every 4 Hours PRN      08/03/20 1830    08/03/20 1830  HYDROcodone-acetaminophen (NORCO) 5-325 MG per tablet 1 tablet  Every 4 Hours PRN      08/03/20 1830    08/03/20 1830  calcium carbonate EX (TUMS EX) chewable tablet 750 mg  2 Times Daily PRN      08/03/20 1830    08/03/20 1830  docusate sodium (COLACE) capsule 100 mg  2 Times Daily PRN      08/03/20 1830    08/03/20 1830  dextrose (GLUTOSE) oral gel 15 g  Every 15 Minutes PRN      08/03/20 1830    08/03/20 1830  dextrose (D50W) 25 g/ 50mL Intravenous Solution 25 g  Every 15 Minutes PRN      08/03/20 1830    08/03/20 1830  glucagon (human recombinant) (GLUCAGEN DIAGNOSTIC) injection 1 mg  Every 15 Minutes PRN      08/03/20 1830    08/03/20 1830  albuterol sulfate HFA (PROVENTIL HFA;VENTOLIN HFA;PROAIR HFA) inhaler 2 puff  Every 4 Hours PRN      08/03/20 1830    08/03/20 1830  azelastine (ASTELIN) nasal spray 1 spray  2 Times Daily PRN      08/03/20 1830    08/03/20 1830  butalbital-acetaminophen-caffeine  (FIORICET, ESGIC) -40 MG per tablet 1 tablet  Every 6 Hours PRN      08/03/20 1830    08/03/20 1830  sodium chloride 0.9 % flush 10 mL  As Needed      08/03/20 1830    08/03/20 1731  Lactic Acid, Reflex  STAT      08/03/20 1730    08/03/20 1610  Code Status and Medical Interventions:  Continuous      08/03/20 1612    08/03/20 1552  Respiratory Panel PCR w/COVID-19(SARS-CoV-2) ART/[facility] In-House, NP Swab in [facility], 8-12 Hr TAT - Swab, Nasopharynx  Once      08/03/20 1552    08/03/20 1552  Inpatient Admission  Once      08/03/20 1552    08/03/20 1550  CT Chest Without Contrast  1 Time Imaging      08/03/20 1550    08/03/20 1550  ED Bed Request  Once      08/03/20 1552    08/03/20 1444  iopamidol (ISOVUE-370) 76 % injection 150 mL  Once in Imaging      08/03/20 1442    08/03/20 1422  Lactic Acid, Reflex Timer (This will reflex a repeat order 3-3:15 hours after ordered.)  Once      08/03/20 1421    08/03/20 1337  CT Angiogram Head  1 Time Imaging      08/03/20 1336    08/03/20 1337  CT Angiogram Neck  1 Time Imaging      08/03/20 1336    08/03/20 1336  CT Cerebral Perfusion With & Without Contrast  1 Time Imaging      08/03/20 1336    08/03/20 1324  Lactic Acid, Plasma  Once      08/03/20 1323    08/03/20 1323  CT Head Without Contrast  1 Time Imaging      08/03/20 1322    08/03/20 1323  Blood Culture - Blood,  Once      08/03/20 1323    08/03/20 1323  Blood Culture - Blood,  Once      08/03/20 1323    08/03/20 1319  sodium chloride 0.9 % bolus 1,000 mL  Once      08/03/20 1317    08/03/20 1318  TSH  STAT      08/03/20 1317    08/03/20 1318  Procalcitonin  STAT      08/03/20 1317    08/03/20 1317  sodium chloride 0.9 % bolus 1,000 mL  Once      08/03/20 1315    08/03/20 1317  Urinalysis With Culture If Indicated - Urine, Clean Catch  STAT      08/03/20 1317    08/03/20 1230  NPO Diet  Diet Effective Now,   Status:  Canceled      08/03/20 1229    08/03/20 1230  Undress and Gown  Once      08/03/20 1229     08/03/20 1230  Cardiac monitoring  Per Hospital Policy      08/03/20 1229    08/03/20 1230  Continuous Pulse Oximetry  Continuous      08/03/20 1229    08/03/20 1230  Vital Signs  Per Hospital Policy      08/03/20 1229    08/03/20 1230  ECG 12 Lead  Once      08/03/20 1229    08/03/20 1230  XR Chest 1 View  1 Time Imaging      08/03/20 1229    08/03/20 1230  Insert peripheral IV  Once      08/03/20 1229    08/03/20 1230  Hooks Draw  Once      08/03/20 1229    08/03/20 1230  CBC & Differential  Once      08/03/20 1229    08/03/20 1230  Comprehensive Metabolic Panel  Once      08/03/20 1229    08/03/20 1230  BNP  Once      08/03/20 1229    08/03/20 1230  Troponin  Once      08/03/20 1229    08/03/20 1230  Light Blue Top  PROCEDURE ONCE      08/03/20 1229    08/03/20 1230  Green Top (Gel)  PROCEDURE ONCE      08/03/20 1229    08/03/20 1230  Lavender Top  PROCEDURE ONCE      08/03/20 1229    08/03/20 1230  Gold Top - SST  PROCEDURE ONCE      08/03/20 1229    08/03/20 1230  CBC Auto Differential  PROCEDURE ONCE      08/03/20 1229    08/03/20 1229  Oxygen Therapy- Nasal Cannula; 2 LPM; Titrate for SPO2: equal to or greater than, 92%  Continuous PRN,   Status:  Canceled      08/03/20 1229    08/03/20 1229  sodium chloride 0.9 % flush 10 mL  As Needed      08/03/20 1229

## 2020-08-04 ENCOUNTER — APPOINTMENT (OUTPATIENT)
Dept: MRI IMAGING | Facility: HOSPITAL | Age: 60
End: 2020-08-04

## 2020-08-04 ENCOUNTER — EPISODE CHANGES (OUTPATIENT)
Dept: CASE MANAGEMENT | Facility: OTHER | Age: 60
End: 2020-08-04

## 2020-08-04 ENCOUNTER — APPOINTMENT (OUTPATIENT)
Dept: NEUROLOGY | Facility: HOSPITAL | Age: 60
End: 2020-08-04

## 2020-08-04 LAB
ALBUMIN SERPL-MCNC: 3.7 G/DL (ref 3.5–5.2)
ALBUMIN/GLOB SERPL: 1.4 G/DL
ALP SERPL-CCNC: 91 U/L (ref 39–117)
ALT SERPL W P-5'-P-CCNC: 56 U/L (ref 1–41)
ANION GAP SERPL CALCULATED.3IONS-SCNC: 10 MMOL/L (ref 5–15)
AST SERPL-CCNC: 55 U/L (ref 1–40)
BASOPHILS # BLD MANUAL: 0.08 10*3/MM3 (ref 0–0.2)
BASOPHILS NFR BLD AUTO: 1 % (ref 0–1.5)
BILIRUB SERPL-MCNC: 0.9 MG/DL (ref 0–1.2)
BUN SERPL-MCNC: 14 MG/DL (ref 6–20)
BUN/CREAT SERPL: 15.4 (ref 7–25)
CALCIUM SPEC-SCNC: 8.9 MG/DL (ref 8.6–10.5)
CHLORIDE SERPL-SCNC: 99 MMOL/L (ref 98–107)
CO2 SERPL-SCNC: 25 MMOL/L (ref 22–29)
CREAT SERPL-MCNC: 0.91 MG/DL (ref 0.76–1.27)
DEPRECATED RDW RBC AUTO: 43.4 FL (ref 37–54)
EOSINOPHIL # BLD MANUAL: 0.31 10*3/MM3 (ref 0–0.4)
EOSINOPHIL NFR BLD MANUAL: 4 % (ref 0.3–6.2)
ERYTHROCYTE [DISTWIDTH] IN BLOOD BY AUTOMATED COUNT: 14.9 % (ref 12.3–15.4)
GFR SERPL CREATININE-BSD FRML MDRD: 85 ML/MIN/1.73
GLOBULIN UR ELPH-MCNC: 2.6 GM/DL
GLUCOSE BLDC GLUCOMTR-MCNC: 252 MG/DL (ref 70–130)
GLUCOSE BLDC GLUCOMTR-MCNC: 342 MG/DL (ref 70–130)
GLUCOSE BLDC GLUCOMTR-MCNC: 363 MG/DL (ref 70–130)
GLUCOSE BLDC GLUCOMTR-MCNC: 385 MG/DL (ref 70–130)
GLUCOSE BLDC GLUCOMTR-MCNC: 401 MG/DL (ref 70–130)
GLUCOSE SERPL-MCNC: 257 MG/DL (ref 65–99)
HBA1C MFR BLD: 9.7 % (ref 4.8–5.6)
HCT VFR BLD AUTO: 37.5 % (ref 37.5–51)
HGB BLD-MCNC: 12.2 G/DL (ref 13–17.7)
LYMPHOCYTES # BLD MANUAL: 1.46 10*3/MM3 (ref 0.7–3.1)
LYMPHOCYTES NFR BLD MANUAL: 19 % (ref 19.6–45.3)
LYMPHOCYTES NFR BLD MANUAL: 4 % (ref 5–12)
MAGNESIUM SERPL-MCNC: 1.8 MG/DL (ref 1.6–2.6)
MCH RBC QN AUTO: 26.1 PG (ref 26.6–33)
MCHC RBC AUTO-ENTMCNC: 32.5 G/DL (ref 31.5–35.7)
MCV RBC AUTO: 80.1 FL (ref 79–97)
METAMYELOCYTES NFR BLD MANUAL: 1 % (ref 0–0)
MONOCYTES # BLD AUTO: 0.31 10*3/MM3 (ref 0.1–0.9)
MYELOCYTES NFR BLD MANUAL: 1 % (ref 0–0)
NEUTROPHILS # BLD AUTO: 5.31 10*3/MM3 (ref 1.7–7)
NEUTROPHILS NFR BLD MANUAL: 68 % (ref 42.7–76)
NEUTS BAND NFR BLD MANUAL: 1 % (ref 0–5)
PLAT MORPH BLD: NORMAL
PLATELET # BLD AUTO: 205 10*3/MM3 (ref 140–450)
PMV BLD AUTO: 10.2 FL (ref 6–12)
POTASSIUM SERPL-SCNC: 4.1 MMOL/L (ref 3.5–5.2)
PROT SERPL-MCNC: 6.3 G/DL (ref 6–8.5)
RBC # BLD AUTO: 4.68 10*6/MM3 (ref 4.14–5.8)
RBC MORPH BLD: NORMAL
SODIUM SERPL-SCNC: 134 MMOL/L (ref 136–145)
VARIANT LYMPHS NFR BLD MANUAL: 1 % (ref 0–5)
WBC # BLD AUTO: 7.7 10*3/MM3 (ref 3.4–10.8)
WBC MORPH BLD: NORMAL

## 2020-08-04 PROCEDURE — 99232 SBSQ HOSP IP/OBS MODERATE 35: CPT | Performed by: INTERNAL MEDICINE

## 2020-08-04 PROCEDURE — 97161 PT EVAL LOW COMPLEX 20 MIN: CPT

## 2020-08-04 PROCEDURE — 83036 HEMOGLOBIN GLYCOSYLATED A1C: CPT | Performed by: INTERNAL MEDICINE

## 2020-08-04 PROCEDURE — 95819 EEG AWAKE AND ASLEEP: CPT

## 2020-08-04 PROCEDURE — 85007 BL SMEAR W/DIFF WBC COUNT: CPT | Performed by: INTERNAL MEDICINE

## 2020-08-04 PROCEDURE — 94799 UNLISTED PULMONARY SVC/PX: CPT

## 2020-08-04 PROCEDURE — 97116 GAIT TRAINING THERAPY: CPT

## 2020-08-04 PROCEDURE — 80053 COMPREHEN METABOLIC PANEL: CPT | Performed by: INTERNAL MEDICINE

## 2020-08-04 PROCEDURE — 83735 ASSAY OF MAGNESIUM: CPT | Performed by: INTERNAL MEDICINE

## 2020-08-04 PROCEDURE — 25010000002 HEPARIN (PORCINE) PER 1000 UNITS: Performed by: INTERNAL MEDICINE

## 2020-08-04 PROCEDURE — 85027 COMPLETE CBC AUTOMATED: CPT | Performed by: INTERNAL MEDICINE

## 2020-08-04 PROCEDURE — 82962 GLUCOSE BLOOD TEST: CPT

## 2020-08-04 PROCEDURE — 70551 MRI BRAIN STEM W/O DYE: CPT

## 2020-08-04 PROCEDURE — 63710000001 INSULIN DETEMIR PER 5 UNITS: Performed by: INTERNAL MEDICINE

## 2020-08-04 PROCEDURE — 25010000002 LORAZEPAM PER 2 MG: Performed by: INTERNAL MEDICINE

## 2020-08-04 PROCEDURE — 63710000001 INSULIN LISPRO (HUMAN) PER 5 UNITS: Performed by: INTERNAL MEDICINE

## 2020-08-04 PROCEDURE — 97165 OT EVAL LOW COMPLEX 30 MIN: CPT

## 2020-08-04 RX ORDER — LORAZEPAM 2 MG/ML
0.5 INJECTION INTRAMUSCULAR ONCE
Status: COMPLETED | OUTPATIENT
Start: 2020-08-04 | End: 2020-08-04

## 2020-08-04 RX ORDER — IPRATROPIUM BROMIDE AND ALBUTEROL SULFATE 2.5; .5 MG/3ML; MG/3ML
3 SOLUTION RESPIRATORY (INHALATION)
Status: DISCONTINUED | OUTPATIENT
Start: 2020-08-04 | End: 2020-08-06 | Stop reason: HOSPADM

## 2020-08-04 RX ORDER — LISINOPRIL 5 MG/1
5 TABLET ORAL
Status: DISCONTINUED | OUTPATIENT
Start: 2020-08-04 | End: 2020-08-06 | Stop reason: HOSPADM

## 2020-08-04 RX ADMIN — SODIUM CHLORIDE, PRESERVATIVE FREE 10 ML: 5 INJECTION INTRAVENOUS at 21:21

## 2020-08-04 RX ADMIN — CLOPIDOGREL BISULFATE 75 MG: 75 TABLET ORAL at 09:02

## 2020-08-04 RX ADMIN — LORAZEPAM 0.5 MG: 2 INJECTION INTRAMUSCULAR; INTRAVENOUS at 05:05

## 2020-08-04 RX ADMIN — BUDESONIDE AND FORMOTEROL FUMARATE DIHYDRATE 2 PUFF: 80; 4.5 AEROSOL RESPIRATORY (INHALATION) at 20:14

## 2020-08-04 RX ADMIN — SODIUM CHLORIDE, PRESERVATIVE FREE 10 ML: 5 INJECTION INTRAVENOUS at 09:03

## 2020-08-04 RX ADMIN — BUDESONIDE AND FORMOTEROL FUMARATE DIHYDRATE 2 PUFF: 80; 4.5 AEROSOL RESPIRATORY (INHALATION) at 08:38

## 2020-08-04 RX ADMIN — CETIRIZINE HYDROCHLORIDE 10 MG: 10 TABLET, FILM COATED ORAL at 09:02

## 2020-08-04 RX ADMIN — ALBUTEROL SULFATE 2 PUFF: 108 AEROSOL, METERED RESPIRATORY (INHALATION) at 08:38

## 2020-08-04 RX ADMIN — HEPARIN SODIUM 5000 UNITS: 5000 INJECTION, SOLUTION INTRAVENOUS; SUBCUTANEOUS at 15:36

## 2020-08-04 RX ADMIN — INSULIN DETEMIR 10 UNITS: 100 INJECTION, SOLUTION SUBCUTANEOUS at 21:23

## 2020-08-04 RX ADMIN — ASPIRIN 81 MG: 81 TABLET, COATED ORAL at 09:02

## 2020-08-04 RX ADMIN — GABAPENTIN 600 MG: 300 CAPSULE ORAL at 21:21

## 2020-08-04 RX ADMIN — ATORVASTATIN CALCIUM 80 MG: 40 TABLET, FILM COATED ORAL at 21:21

## 2020-08-04 RX ADMIN — PANTOPRAZOLE SODIUM 40 MG: 40 TABLET, DELAYED RELEASE ORAL at 07:02

## 2020-08-04 RX ADMIN — INSULIN LISPRO 6 UNITS: 100 INJECTION, SOLUTION INTRAVENOUS; SUBCUTANEOUS at 09:10

## 2020-08-04 RX ADMIN — ZAFIRLUKAST 20 MG: 20 TABLET, COATED ORAL at 11:06

## 2020-08-04 RX ADMIN — INSULIN LISPRO 8 UNITS: 100 INJECTION, SOLUTION INTRAVENOUS; SUBCUTANEOUS at 17:43

## 2020-08-04 RX ADMIN — LISINOPRIL 5 MG: 5 TABLET ORAL at 12:35

## 2020-08-04 RX ADMIN — INSULIN LISPRO 6 UNITS: 100 INJECTION, SOLUTION INTRAVENOUS; SUBCUTANEOUS at 12:35

## 2020-08-04 RX ADMIN — ALBUTEROL SULFATE 2 PUFF: 108 AEROSOL, METERED RESPIRATORY (INHALATION) at 20:14

## 2020-08-04 RX ADMIN — ALBUTEROL SULFATE 2 PUFF: 108 AEROSOL, METERED RESPIRATORY (INHALATION) at 12:06

## 2020-08-04 RX ADMIN — ALBUTEROL SULFATE 2 PUFF: 108 AEROSOL, METERED RESPIRATORY (INHALATION) at 15:55

## 2020-08-04 RX ADMIN — HEPARIN SODIUM 5000 UNITS: 5000 INJECTION, SOLUTION INTRAVENOUS; SUBCUTANEOUS at 21:22

## 2020-08-04 RX ADMIN — GABAPENTIN 600 MG: 300 CAPSULE ORAL at 09:02

## 2020-08-04 RX ADMIN — HEPARIN SODIUM 5000 UNITS: 5000 INJECTION, SOLUTION INTRAVENOUS; SUBCUTANEOUS at 07:02

## 2020-08-04 RX ADMIN — ZAFIRLUKAST 20 MG: 20 TABLET, COATED ORAL at 21:20

## 2020-08-04 RX ADMIN — AMITRIPTYLINE HYDROCHLORIDE 75 MG: 50 TABLET, FILM COATED ORAL at 21:20

## 2020-08-04 NOTE — THERAPY DISCHARGE NOTE
Acute Care - Occupational Therapy Initial Eval/Discharge  [facility]     Patient Name: Denzel Harding  : 1960  MRN: [id number]  Today's Date: 2020               Admit Date: 8/3/2020       ICD-10-CM ICD-9-CM   1. Hypotension, unspecified hypotension type I95.9 458.9   2. Near syncope R55 780.2   3. Lightheadedness R42 780.4   4. Seizure-like activity (CMS/[facility]) R56.9 780.39   5. Impaired mobility and ADLs Z74.09 V49.89    Z78.9      Patient Active Problem List   Diagnosis   • Coronary arteriosclerosis in native artery   • Lumbar radiculopathy   • Hypercholesterolemia   • Diabetic polyneuropathy associated with type 2 diabetes mellitus (CMS/[facility])   • Migraine with aura and with status migrainosus   • Palpitations   • Seizure disorder (CMS/[facility])   • GERD (gastroesophageal reflux disease)   • Brachial neuritis   • Cervical radiculopathy   • LOM (loss of memory)   • Anxiety   • Diabetes mellitus (CMS/HCC)   • Lower back pain   • Hypertension   • History of CVA (cerebrovascular accident)   • Non morbid obesity due to excess calories   • Post-infarction pericarditis (CMS/HCC)   • HCAP (healthcare-associated pneumonia)   • CVA (S/P tPA)   • Acute bronchitis   • Type 2 diabetes mellitus (CMS/[facility])   • Headache syndrome, complicated   • Chronic intractable headache   • Obstructive sleep apnea syndrome   • Acute exacerbation of asthma with allergic rhinitis   • Asthma   • Thrush   • Acute diastolic CHF (congestive heart failure) (CMS/HCC)   • Chronic diastolic CHF (congestive heart failure) (CMS/HCC)   • COPD (chronic obstructive pulmonary disease) (CMS/HCC)   • JUAN (acute kidney injury) (CMS/HCC)   • Acute sinusitis   • [place]lock ronnie's stroke   • Vertebrobasilar insufficiency   • Personal history of colonic polyps   • Pre-syncope   • Lactic acidosis     Past Medical History:   Diagnosis Date   • Anxiety    • Arthritis    • Asthma    • Brachial neuritis 2017   • Cellulitis     left arm and right leg    • Chest  pain    • CHF (congestive heart failure) (CMS/[facility])     Patient reported history May 2020    • COPD (chronic obstructive pulmonary disease) (CMS/[facility])    • Coronary artery disease     Patient reported CABG , 3 vessel   • Depression    • Diabetes mellitus (CMS/[facility])     diagnosed in 1998, checks fsbg 3-4x/day   • Dizziness    • Edema    • Elevated cholesterol    • GERD (gastroesophageal reflux disease)    • H/O chest x-ray 07/04/2015    Mild Left base atelectasis   • H/O echocardiogram 07/05/2015    Normal LVSF. EF of 60-65%. Grade 1 diastolic dysfunction of the LV myocardium. No evidence of pericardial effusion   • H/O exercise stress test    • Hearing loss     No use of hearing aids   • History of fracture     Reported 2 bones in left foot and a finger   • History of herniated intervertebral disc     History of left L5-S1 disc herniation   • History of pneumonia    • History of pneumonia    • Hypertension    • LOM (loss of memory) 1/19/2017   • Lower back pain     Right   • Measles    • Neuropathy     feet    • EDD treated with BiPAP     BiPAP HS - instructed to bring mask/machine DOS (setting 13 and 5)   • Palpitations    • Pericardial effusion    • Poor dentition     Patient reported missing multiple teeth   • Renal disorder    • Seizure disorder (CMS/[facility])    • Seizures (CMS/[facility])     FOCAL last 2009   • Shortness of breath 1/19/2017   • SOB (shortness of breath)    • Staph infection     back after sugery at the incision site    • Stroke (CMS/[facility])     x2 most recent , slight weakness in hands occasionaly    • Wears glasses      Past Surgical History:   Procedure Laterality Date   • BACK SURGERY     • CARDIAC CATHETERIZATION     • CARDIAC CATHETERIZATION N/A 8/11/2017    Procedure: Coronary angiography;  Surgeon: Dallas Kim MD;  Location:  [facility] C[facility]VASIVE [facility];  Service:    • CARDIAC CATHETERIZATION N/A 8/11/2017    Procedure: Left Heart Cath;  Surgeon: Dallas Kim MD;  Location:   [facility];  Service:    • CARDIAC CATHETERIZATION N/A 8/11/2017    Procedure: Left ventriculography;  Surgeon: Dallas Hernandez MD;  Location: [facility] CATH INVASIVE LOCATION;  Service:    • CARDIAC CATHETERIZATION      2002 x1 stent,  x1 stent   • CARDIOVASCULAR STRESS TEST  07/03/2017    WITH DR HERNANDEZ AT [facility]   • CARPAL TUNNEL RELEASE Right    • CATARACT EXTRACTION W/ INTRAOCULAR LENS IMPLANT Right 6/12/2017    Procedure: CATARACT PHACO EXTRACTION WITH INTRAOCULAR LENS IMPLANT RIGHT ;  Surgeon: Natalie Cao MD;  Location: [facility] OR;  Service:    • CATARACT EXTRACTION W/ INTRAOCULAR LENS IMPLANT Left 7/10/2017    Procedure: CATARACT PHACO EXTRACTION WITH INTRAOCULAR LENS IMPLANT LEFT;  Surgeon: Natalie Cao MD;  Location: [facility] OR;  Service:    • CHOLECYSTECTOMY     • COLONOSCOPY     • COLONOSCOPY N/A 7/2/2020    Procedure: COLONOSCOPY;  Surgeon: Petra Crowell MD;  Location: [facility] ENDOSCOPY;  Service: Gastroenterology;  Laterality: N/A;  poor prep   • CORONARY ANGIOPLASTY WITH STENT PLACEMENT      X1-LAD   • CORONARY ARTERY BYPASS GRAFT N/A 8/15/2017    Procedure: CORONARY ARTERY BYPASS GRAFTING x 3 UTILIZING THE LEFT INTERNAL MAMMARY ARTERY WITH ENDOSCOPIC VEIN HARVESTING OF THE RIGHT GREATER SAPHENOUS VEIN, HAMLET, LAD ENDARECTOMY;  Surgeon: London Damon MD;  Location: [facility] OR;  Service:    • ENDOSCOPY     • EYE CAPSULOTOMY WITH LASER Right 11/25/2019    Procedure: EYE CAPSULOTOMY WITH LASER RIGHT;  Surgeon: Natalie Cao MD;  Location: [facility] OR;  Service: Ophthalmology   • EYE CAPSULOTOMY WITH LASER Left 12/9/2019    Procedure: EYE CAPSULOTOMY WITH LASER LEFT;  Surgeon: Natalie Cao MD;  Location: [facility] OR;  Service: Ophthalmology   • EYE SURGERY      cataract surgery both eyes   • INTERVENTIONAL RADIOLOGY PROCEDURE Bilateral 12/31/2019    Procedure: Carotid Cerebral Angiogram;  Surgeon: Damian Dubois MD;  Location:  [facility] CATH INVASIVE LOCATION;  Service:  Interventional Radiology   • KNEE ARTHROSCOPY Bilateral    • KNEE ARTHROSCOPY Right 2010    Dr Lewis   • KNEE ARTHROSCOPY Left 2008    Dr Jameson   • MOUTH SURGERY      Oral extractions   • NEUROPLASTY / TRANSPOSITION ULNAR NERVE AT ELBOW Left    • OTHER SURGICAL HISTORY      Foraminotomy and discectomy   • PERICARDIAL WINDOW N/A 8/25/2017    Procedure: PERICARDIAL WINDOW;  Surgeon: Freeman Phillips MD;  Location: [facility] OR;  Service:           OT ASSESSMENT FLOWSHEET (last 12 hours)      Occupational Therapy Evaluation     Row Name 08/04/20 1071                   OT Evaluation Time/Intention    Subjective Information  complains of;pain  -EVER        Document Type  evaluation;discharge evaluation/summary  -EVER        Mode of Treatment  individual therapy;occupational therapy  -EVER        Patient Effort  good  -EVER        Symptoms Noted During/After Treatment  none  -EVER           General Information    Patient Profile Reviewed?  yes  -EVER        Patient Observations  cooperative;agree to therapy asleep, but aroused well  -EVER        Patient/Family Observations  no family present.  -EVER        Prior Level of Function  independent:;all household mobility;community mobility;ADL's;driving;shopping;max assist:;home management wife/dtr. do most HM, per pt. fishes and hunts  -EVER        Equipment Currently Used at Home  cane, straight;rollator mostly uses cane  -EVER        Existing Precautions/Restrictions  fall  -EVER        Risks Reviewed  patient:;LOB;increased discomfort;change in vital signs  -EVER        Benefits Reviewed  patient:;increase knowledge  -EVER        Barriers to Rehab  previous functional deficit  -EVER           Relationship/Environment    Primary Source of Support/Comfort  spouse  -EVER        Lives With  spouse;child(yenni), adult 24/7 availability if needed, adult dtr also at home  -EVER        Primary Roles/Responsibilities  disabled  -EVER           Resource/Environmental Concerns    Current Living Arrangements   home/apartment/condo  -EVER           Home Main Entrance    Number of Stairs, Main Entrance  none ramp  -EVER           Stairs Within Home, Primary    Number of Stairs, Within Home, Primary  none  -EVER           Cognitive Assessment/Intervention- PT/OT    Orientation Status (Cognition)  oriented x 4  -EVER        Follows Commands (Cognition)  WFL  -EVER           Safety Issues, Functional Mobility    Impairments Affecting Function (Mobility)  balance;endurance/activity tolerance  -EVER           Bed Mobility Assessment/Treatment    Comment (Bed Mobility)  UIC on arrival  -EVER           Functional Mobility    Functional Mobility- Ind. Level  contact guard assist  -EVER        Functional Mobility- Device  straight cane  -EVER        Functional Mobility-Distance (Feet)  200  -EVER        Functional Mobility- Comment  pt. noted to be less steady as fatigued, but no LOB  -EVER           Transfer Assessment/Treatment    Transfer Assessment/Treatment  sit-stand transfer;stand-sit transfer;toilet transfer  -EVER        Comment (Transfers)  discussed with pt. ways to make BR safer with bars, high tiolet and bar in shower /tub  -EVER           Sit-Stand Transfer    Sit-Stand [place] (Transfers)  conditional independence  -EVER        Assistive Device (Sit-Stand Transfers)  cane, straight  -EVER           Stand-Sit Transfer    Stand-Sit [place] (Transfers)  conditional independence  -EVER        Assistive Device (Stand-Sit Transfers)  cane, straight  -EVER           Toilet Transfer    Type (Toilet Transfer)  stand-sit;sit-stand  -EVER        [place] Level (Toilet Transfer)  conditional independence  -EVER        Assistive Device (Toilet Transfer)  grab bars/safety frame  -EVER           ADL Assessment/Intervention    BADL Assessment/Intervention  lower body dressing  -EVER           Lower Body Dressing Assessment/Training    Lower Body Dressing [place] Level  don;doff;socks;shoes/slippers;independent  -EVER        Lower Body Dressing Position   supported sitting  -EVER        Comment (Lower Body Dressing)  good flexibility in hips  -EVER           BADL Safety/Performance    Impairments, BADL Safety/Performance  endurance/activity tolerance;balance  -EVER        Skilled BADL Treatment/Intervention  -- home safety AD for bathroom  -EVER        Progress in BADL Status  -- education  -EVER           General ROM    GENERAL ROM COMMENTS  WFL AROM BUE's  -EVER           MMT (Manual Muscle Testing)    General MMT Comments  BUE grossly 4+ to 5/5  -EVER           Motor Assessment/Interventions    Additional Documentation  Balance (Group);Balance Interventions (Group);Therapeutic Exercise (Group);Therapeutic Exercise Interventions (Group);Gross Motor Coordination (Group)  -EVER           Gross Motor Coordination    Gross Motor Impairments  finger to nose;rapid alternating  -EVER        Gross Motor Skill, Impairments Detail  intact BUE  -EVER           Balance    Balance  dynamic sitting balance  -EVER           Dynamic Sitting Balance    Level of [place], Reaches Outside Midline (Sitting, Dynamic Balance)  independent  -EVER        Sitting Position, Reaches Outside Midline (Sitting, Dynamic Balance)  sitting in chair with dressing  -EVER           Sensory Assessment/Intervention    Sensory General Assessment  no sensation deficits identified BUe  -EVER           Positioning and Restraints    Pre-Treatment Position  sitting in chair/recliner  -EVER        Post Treatment Position  chair  -EVER        In Chair  reclined;call light within reach;encouraged to call for assist;exit alarm on  -EVER           Pain Assessment    Additional Documentation  Pain Scale: Numbers Pre/Post-Treatment (Group)  -EVER           Pain Scale: Numbers Pre/Post-Treatment    Pain Scale: Numbers, Pretreatment  4/10  -EVER        Pain Scale: Numbers, Post-Treatment  4/10  -EVER        Pain Location - Side  Bilateral  -EVER        Pain Location - Orientation  lower  -EVER        Pain Location  back  -EVER        Pain Intervention(s)   Repositioned per pt. pain meds give in hospital not work and declined  -EVER           Clinical Impression (OT)    OT Diagnosis  potential for impaired ADL independence  -EVER        Patient/Family Goals Statement (OT Eval)  return home  -EVER        Criteria for Skilled Therapeutic Interventions Met (OT Eval)  no;treatment indicated pt. appears baseline for ADL task completion  -EVER        Therapy Frequency (OT Eval)  evaluation only  -EVER        Care Plan Review (OT)  evaluation/treatment results reviewed  -EVER        Anticipated Discharge Disposition (OT)  home with assist  -EVER           Vital Signs    Pre Systolic BP Rehab  155  -EVER        Pre Treatment Diastolic BP  73  -EVER        Post Systolic BP Rehab  157  -EVER        Post Treatment Diastolic BP  82  -EVER        Posttreatment Heart Rate (beats/min)  96  -EVER        Post SpO2 (%)  98  -EVER        O2 Delivery Post Treatment  room air  -EVER        Pre Patient Position  Sitting  -EVER        Intra Patient Position  Standing  -EVER        Post Patient Position  Sitting  -EVER           Living Environment    Home Accessibility  tub/shower is not walk in also has a wx in shower, low toilet  -EVER          User Key  (r) = Recorded By, (t) = Taken By, (c) = Cosigned By    Initials Name Effective Dates    Yasmin Oneill, OT 06/08/18 -           Occupational Therapy Education                 Title: PT OT SLP Therapies (Not Started)     Topic: Occupational Therapy (In Progress)     Point: ADL training (Done)     Description:   Instruct learner(s) on proper safety adaptation and remediation techniques during self care or transfers.   Instruct in proper use of assistive devices.              Learning Progress Summary           Patient Acceptance, E, VU by EVER at 8/4/2020 1108    Comment:  reason for consult, evaluation results, recommended bathroom safety AD                   Point: Home exercise program (Not Started)     Description:   Instruct learner(s) on appropriate technique for  monitoring, assisting and/or progressing therapeutic exercises/activities.              Learner Progress:   Not documented in this visit.          Point: Precautions (Not Started)     Description:   Instruct learner(s) on prescribed precautions during self-care and functional transfers.              Learner Progress:   Not documented in this visit.          Point: Body mechanics (Not Started)     Description:   Instruct learner(s) on proper positioning and spine alignment during self-care, functional mobility activities and/or exercises.              Learner Progress:   Not documented in this visit.                      User Key     Initials Effective Dates Name Provider Type Discipline     06/08/18 -  Yasmin Herbert, OT Occupational Therapist OT                OT Recommendation and Plan  Outcome Summary/Treatment Plan (OT)  Anticipated Discharge Disposition (OT): home with assist  Therapy Frequency (OT Eval): evaluation only  Plan of Care Review  Plan of Care Reviewed With: patient  Plan of Care Reviewed With: patient  Outcome Summary: OT evaluation completed.  Pt. ambulated 200+ feet.  CGA given for evaluation purposes, but no assist needed.  Pt. noted less steady as fatigued.  Pt. demonstrated I donning and doffing socks and CI with transfers.  Bathroom safety AD discussed and recommendations for home.  No skilled OT services needed.  Recommend home with family assist.     Rehab Goal Summary     Row Name 08/04/20 1152             Bed Mobility Goal 1 (PT)    Activity/Assistive Device (Bed Mobility Goal 1, PT)  bed mobility activities, all  -VG      [place] Level/Cues Needed (Bed Mobility Goal 1, PT)  independent  -VG      Time Frame (Bed Mobility Goal 1, PT)  long term goal (LTG);2 weeks  -VG         Transfer Goal 1 (PT)    Activity/Assistive Device (Transfer Goal 1, PT)  sit-to-stand/stand-to-sit;cane, straight  -VG      [place] Level/Cues Needed (Transfer Goal 1, PT)  conditional independence  -VG       Time Frame (Transfer Goal 1, PT)  long term goal (LTG);2 weeks  -VG         Gait Training Goal 1 (PT)    Activity/Assistive Device (Gait Training Goal 1, PT)  gait (walking locomotion);cane, straight  -VG      [place] Level (Gait Training Goal 1, PT)  conditional independence  -VG      Distance (Gait Goal 1, PT)  300  -VG         Patient Education Goal (PT)    Activity (Patient Education Goal, PT)  HEP  -VG      [place]/Cues/Accuracy (Memory Goal 2, PT)  independent  -VG      Time Frame (Patient Education Goal, PT)  long term goal (LTG);2 weeks  -VG        User Key  (r) = Recorded By, (t) = Taken By, (c) = Cosigned By    Initials Name Provider Type Discipline    VG Vianca York, PT Physical Therapist PT          Outcome Measures     Row Name 08/04/20 1351             How much help from another is currently needed...    Putting on and taking off regular lower body clothing?  4  -EVER      Bathing (including washing, rinsing, and drying)  4  -EVER      Toileting (which includes using toilet bed pan or urinal)  4  -EVER      Putting on and taking off regular upper body clothing  4  -EVER      Taking care of personal grooming (such as brushing teeth)  4  -EVER      Eating meals  4  -EVER      AM-PAC 6 Clicks Score (OT)  24  -EVER         Functional Assessment    Outcome Measure Options  AM-PAC 6 Clicks Daily Activity (OT)  -EVER        User Key  (r) = Recorded By, (t) = Taken By, (c) = Cosigned By    Initials Name Provider Type    Yasmin Oneill OT Occupational Therapist          Time Calculation:   Time Calculation- OT     Row Name 08/04/20 1437 08/04/20 1055          Time Calculation- OT    OT Start Time  1351  -EVER  --     OT Received On  08/04/20  -EVER  --     OT Goal Re-Cert Due Date  08/14/20  -EVER  --        Timed Charges    82290 - Gait Training Minutes   --  15  -VG       User Key  (r) = Recorded By, (t) = Taken By, (c) = Cosigned By    Initials Name Provider Type    Yasmin Oneill, ALIS Occupational  Therapist    Vianca Cabrales, PT Physical Therapist        Therapy Suggested Charges     Code   Minutes Charges    None           Therapy Charges for Today     Code Description Service Date Service Provider Modifiers Qty    74464208973 HC OT EVAL LOW COMPLEXITY 4 8/4/2020 Yasmin Herbert OT GO 1               OT Discharge Summary  Anticipated Discharge Disposition (OT): home with assist    Yasmin Herbert OT  8/4/2020

## 2020-08-04 NOTE — PROGRESS NOTES
Discharge Planning Assessment  [facility]     Patient Name: Denzel Harding  MRN: [id number]  Today's Date: 8/4/2020    Admit Date: 8/3/2020    Discharge Needs Assessment     Row Name 08/04/20 1515       Living Environment    Lives With  spouse    Name(s) of Who Lives With Patient  Carol Harding    Current Living Arrangements  home/apartment/condo    Primary Care Provided by  self    Provides Primary Care For  no one    Family Caregiver if Needed  spouse    Family Caregiver Names  Carol Harding    Quality of Family Relationships  unable to assess    Able to Return to Prior Arrangements  no       Resource/Environmental Concerns    Resource/Environmental Concerns  none       Transition Planning    Patient/Family Anticipates Transition to  home with family    Patient/Family Anticipated Services at Transition  none    Transportation Anticipated  family or friend will provide       Discharge Needs Assessment    Readmission Within the Last 30 Days  no previous admission in last 30 days    Concerns to be Addressed  no discharge needs identified;denies needs/concerns at this time    Equipment Currently Used at Home  cane, straight;rollator    Anticipated Changes Related to Illness  none    Equipment Needed After Discharge  none    Provided Post Acute Provider List?  N/A    N/A Provider List Comment  denies need    Discharge Coordination/Progress  Home        Discharge Plan     Row Name 08/04/20 1516       Plan    Plan  Home    Patient/Family in Agreement with Plan  yes    Plan Comments  Spoke with patient at bedside regarding discharge planning.  Patient denies use or need of HH and reports he has a Cane that he uses most of the time but occasionallly when he has a really bad day he uses the Rollator as well as a BIPAP and Nebulizer.  He indicates that he has prescription coverage and his medications are affordable.  He lives with his wife in a single level house with ramp access and denies concerns regarding home  safety.  No discharge needs noted.  CM following.  Patient plan is to discharge home via car with family to transport.      Final Discharge Disposition Code  01 - home or self-care        Destination      Coordination has not been started for this encounter.      Durable Medical Equipment      Coordination has not been started for this encounter.      Dialysis/Infusion      Coordination has not been started for this encounter.      Home Medical Care      Coordination has not been started for this encounter.      Therapy      Coordination has not been started for this encounter.      Community Resources      Coordination has not been started for this encounter.        Expected Discharge Date and Time     Expected Discharge Date Expected Discharge Time    Aug 5, 2020         Demographic Summary     Row Name 08/04/20 1510       General Information    Arrived From  home    Referral Source  admission list    Reason for Consult  discharge planning    Preferred Language  English     Used During This Interaction  no    General Information Comments  Isaura Godoy MD       Contact Information    Permission Granted to Share Info With      Contact Information Obtained for      Contact Information Comments  Carol Harding, spouse  [phone]        Functional Status     Row Name 08/04/20 1514       Functional Status    Usual Activity Tolerance  good    Current Activity Tolerance  good       Functional Status, IADL    Medications  independent    Meal Preparation  assistive equipment    Housekeeping  assistive equipment    Laundry  assistive equipment    Shopping  assistive equipment       Employment/    Employment/ Comments  [employer]        Psychosocial    No documentation.       Abuse/Neglect    No documentation.       Legal    No documentation.       Substance Abuse    No documentation.       Patient Forms    No documentation.           June Velásquez,  RN

## 2020-08-04 NOTE — THERAPY EVALUATION
Patient Name: Denzel Harding  : 1960    MRN: [id number]                              Today's Date: 2020       Admit Date: 8/3/2020    Visit Dx:     ICD-10-CM ICD-9-CM   1. Hypotension, unspecified hypotension type I95.9 458.9   2. Near syncope R55 780.2   3. Lightheadedness R42 780.4   4. Seizure-like activity (CMS/[facility]) R56.9 780.39     Patient Active Problem List   Diagnosis   • Coronary arteriosclerosis in native artery   • Lumbar radiculopathy   • Hypercholesterolemia   • Diabetic polyneuropathy associated with type 2 diabetes mellitus (CMS/[facility])   • Migraine with aura and with status migrainosus   • Palpitations   • Seizure disorder (CMS/[facility])   • GERD (gastroesophageal reflux disease)   • Brachial neuritis   • Cervical radiculopathy   • LOM (loss of memory)   • Anxiety   • Diabetes mellitus (CMS/[facility])   • Lower back pain   • Hypertension   • History of CVA (cerebrovascular accident)   • Non morbid obesity due to excess calories   • Post-infarction pericarditis (CMS/[facility])   • HCAP (healthcare-associated pneumonia)   • CVA (S/P tPA)   • Acute bronchitis   • Type 2 diabetes mellitus (CMS/[facility])   • Headache syndrome, complicated   • Chronic intractable headache   • Obstructive sleep apnea syndrome   • Acute exacerbation of asthma with allergic rhinitis   • Asthma   • Thrush   • Acute diastolic CHF (congestive heart failure) (CMS/[facility])   • Chronic diastolic CHF (congestive heart failure) (CMS/[facility])   • COPD (chronic obstructive pulmonary disease) (CMS/[facility])   • JUAN (acute kidney injury) (CMS/HCC)   • Acute sinusitis   • [place] ronnie's stroke   • Vertebrobasilar insufficiency   • Personal history of colonic polyps   • Pre-syncope   • Lactic acidosis     Past Medical History:   Diagnosis Date   • Anxiety    • Arthritis    • Asthma    • Brachial neuritis 2017   • Cellulitis     left arm and right leg    • Chest pain    • CHF (congestive heart failure) (CMS/[facility])     Patient reported history May 2020    • COPD  (chronic obstructive pulmonary disease) (CMS/HCC)    • Coronary artery disease     Patient reported CABG , 3 vessel   • Depression    • Diabetes mellitus (CMS/HCC)     diagnosed in 1998, checks fsbg 3-4x/day   • Dizziness    • Edema    • Elevated cholesterol    • GERD (gastroesophageal reflux disease)    • H/O chest x-ray 07/04/2015    Mild Left base atelectasis   • H/O echocardiogram 07/05/2015    Normal LVSF. EF of 60-65%. Grade 1 diastolic dysfunction of the LV myocardium. No evidence of pericardial effusion   • H/O exercise stress test    • Hearing loss     No use of hearing aids   • History of fracture     Reported 2 bones in left foot and a finger   • History of herniated intervertebral disc     History of left L5-S1 disc herniation   • History of pneumonia    • History of pneumonia    • Hypertension    • LOM (loss of memory) 1/19/2017   • Lower back pain     Right   • Measles    • Neuropathy     feet    • EDD treated with BiPAP     BiPAP HS - instructed to bring mask/machine DOS (setting 13 and 5)   • Palpitations    • Pericardial effusion    • Poor dentition     Patient reported missing multiple teeth   • Renal disorder    • Seizure disorder (CMS/HCC)    • Seizures (CMS/HCC)     FOCAL last 2009   • Shortness of breath 1/19/2017   • SOB (shortness of breath)    • Staph infection     back after sugery at the incision site    • Stroke (CMS/HCC)     x2 most recent , slight weakness in hands occasionaly    • Wears glasses      Past Surgical History:   Procedure Laterality Date   • BACK SURGERY     • CARDIAC CATHETERIZATION     • CARDIAC CATHETERIZATION N/A 8/11/2017    Procedure: Coronary angiography;  Surgeon: Dallas Kim MD;  Location: [facility] CATH INVASIVE LOCATION;  Service:    • CARDIAC CATHETERIZATION N/A 8/11/2017    Procedure: Left Heart Cath;  Surgeon: Dallas Kim MD;  Location: [facility] CATH INVASIVE LOCATION;  Service:    • CARDIAC CATHETERIZATION N/A 8/11/2017    Procedure: Left  ventriculography;  Surgeon: Dallas Hernandez MD;  Location: [facility] CATH INVASIVE LOCATION;  Service:    • CARDIAC CATHETERIZATION      2002 x1 stent,  x1 stent   • CARDIOVASCULAR STRESS TEST  07/03/2017    WITH DR HERNANDEZ AT [facility]   • CARPAL TUNNEL RELEASE Right    • CATARACT EXTRACTION W/ INTRAOCULAR LENS IMPLANT Right 6/12/2017    Procedure: CATARACT PHACO EXTRACTION WITH INTRAOCULAR LENS IMPLANT RIGHT ;  Surgeon: Natalie Cao MD;  Location: [facility] OR;  Service:    • CATARACT EXTRACTION W/ INTRAOCULAR LENS IMPLANT Left 7/10/2017    Procedure: CATARACT PHACO EXTRACTION WITH INTRAOCULAR LENS IMPLANT LEFT;  Surgeon: Natalie Cao MD;  Location: [facility] OR;  Service:    • CHOLECYSTECTOMY     • COLONOSCOPY     • COLONOSCOPY N/A 7/2/2020    Procedure: COLONOSCOPY;  Surgeon: Petra Crowell MD;  Location: [facility] ENDOSCOPY;  Service: Gastroenterology;  Laterality: N/A;  poor prep   • CORONARY ANGIOPLASTY WITH STENT PLACEMENT      X1-LAD   • CORONARY ARTERY BYPASS GRAFT N/A 8/15/2017    Procedure: CORONARY ARTERY BYPASS GRAFTING x 3 UTILIZING THE LEFT INTERNAL MAMMARY ARTERY WITH ENDOSCOPIC VEIN HARVESTING OF THE RIGHT GREATER SAPHENOUS VEIN, HAMLET, LAD ENDARECTOMY;  Surgeon: London Damon MD;  Location: [facility] OR;  Service:    • ENDOSCOPY     • EYE CAPSULOTOMY WITH LASER Right 11/25/2019    Procedure: EYE CAPSULOTOMY WITH LASER RIGHT;  Surgeon: Natalie Cao MD;  Location: [facility] OR;  Service: Ophthalmology   • EYE CAPSULOTOMY WITH LASER Left 12/9/2019    Procedure: EYE CAPSULOTOMY WITH LASER LEFT;  Surgeon: Natalie Cao MD;  Location: [facility] OR;  Service: Ophthalmology   • EYE SURGERY      cataract surgery both eyes   • INTERVENTIONAL RADIOLOGY PROCEDURE Bilateral 12/31/2019    Procedure: Carotid Cerebral Angiogram;  Surgeon: Damian Dubois MD;  Location: [facility] CATH INVASIVE LOCATION;  Service: Interventional Radiology   • KNEE ARTHROSCOPY Bilateral    • KNEE ARTHROSCOPY Right 2010    Dr Lewis   •  KNEE ARTHROSCOPY Left 2008    Dr Jameson   • MOUTH SURGERY      Oral extractions   • NEUROPLASTY / TRANSPOSITION ULNAR NERVE AT ELBOW Left    • OTHER SURGICAL HISTORY      Foraminotomy and discectomy   • PERICARDIAL WINDOW N/A 8/25/2017    Procedure: PERICARDIAL WINDOW;  Surgeon: Freeman Phillips MD;  Location: [facility] OR;  Service:      General Information     Row Name 08/04/20 1132          PT Evaluation Time/Intention    Document Type  evaluation  -VG     Mode of Treatment  individual therapy;physical therapy  -VG     Row Name 08/04/20 1132          General Information    Patient Profile Reviewed?  yes  -VG     Prior Level of Function  independent:;all household mobility;ADL's;home management;community mobility Limited at times by lower back pain. 0 falls in last 6 months.  -VG     Existing Precautions/Restrictions  fall  -VG     Barriers to Rehab  previous functional deficit;medically complex  -VG     Row Name 08/04/20 1132          Relationship/Environment    Lives With  significant other Pt's wife available for 24/7 spv/assist.  -VG     Row Name 08/04/20 1132          Resource/Environmental Concerns    Current Living Arrangements  home/apartment/condo  -VG     Row Name 08/04/20 1132          Home Main Entrance    Number of Stairs, Main Entrance  none Ramp to enter.  -VG     Row Name 08/04/20 1132          Stairs Within Home, Primary    Number of Stairs, Within Home, Primary  none  -VG     Row Name 08/04/20 1132          Cognitive Assessment/Intervention- PT/OT    Orientation Status (Cognition)  oriented to;person;place;situation;time  -VG     Row Name 08/04/20 1132          Safety Issues, Functional Mobility    Safety Issues Affecting Function (Mobility)  insight into deficits/self awareness  -VG     Impairments Affecting Function (Mobility)  balance;endurance/activity tolerance;coordination;strength  -VG       User Key  (r) = Recorded By, (t) = Taken By, (c) = Cosigned By    Initials Name Provider Type    VG  Vianca York, PT Physical Therapist        Mobility     Row Name 08/04/20 1140          Bed Mobility Assessment/Treatment    Bed Mobility Assessment/Treatment  supine-sit  -VG     Supine-Sit [place] (Bed Mobility)  supervision;verbal cues  -VG     Assistive Device (Bed Mobility)  bed rails;head of bed elevated  -VG     Comment (Bed Mobility)  Cues for hand placement and sequencing.   -VG     Row Name 08/04/20 1140          Transfer Assessment/Treatment    Comment (Transfers)  Cues for hand placement and sequencing.  -VG     Row Name 08/04/20 1140          Sit-Stand Transfer    Sit-Stand [place] (Transfers)  contact guard;verbal cues  -VG     Assistive Device (Sit-Stand Transfers)  cane, straight  -VG     Row Name 08/04/20 1140          Gait/Stairs Assessment/Training    [place] Level (Gait)  contact guard;verbal cues;minimum assist (75% patient effort)  -VG     Assistive Device (Gait)  cane, straight  -VG     Distance in Feet (Gait)  150  -VG     Deviations/Abnormal Patterns (Gait)  han decreased;festinating/shuffling;gait speed decreased;ataxic  -VG     Bilateral Gait Deviations  forward flexed posture  -VG     Comment (Gait/Stairs)  Distance limited by fatigue, weakness. Slightly ataxic gait, 1 minor LOB requiring BERNIE for recovery. Pt states this is baseline gait, but 0 falls in last 6 months. Demonstrates slightly impaired endurance, balance, strength, coordination. Cues for upright posture and pacing.  -VG       User Key  (r) = Recorded By, (t) = Taken By, (c) = Cosigned By    Initials Name Provider Type    VG Vianca York, PT Physical Therapist        Obj/Interventions     Row Name 08/04/20 1144          General ROM    GENERAL ROM COMMENTS  BLEs WFL. Heel to shin symmetric bilaterally, no ataxia but slightly slowed processing RLE.  -VG     Row Name 08/04/20 1144          MMT (Manual Muscle Testing)    General MMT Comments  BLEs 4+/5 grossly  -VG     Row Name 08/04/20 1140           Static Sitting Balance    Level of [place] (Unsupported Sitting, Static Balance)  supervision  -VG     Sitting Position (Unsupported Sitting, Static Balance)  sitting on edge of bed  -VG     Row Name 08/04/20 1144          Static Standing Balance    Level of [place] (Supported Standing, Static Balance)  contact guard assist  -VG     Assistive Device Utilized (Supported Standing, Static Balance)  cane, straight  -VG     Row Name 08/04/20 1144          Light Touch Sensation Assessment    Additional Details: Light Touch Sensation Assessment  Pt reports numbness/tingling in B feet d/t peripheral neuropathy at baseline.  -VG       User Key  (r) = Recorded By, (t) = Taken By, (c) = Cosigned By    Initials Name Provider Type    VG Vianca York, PT Physical Therapist        Goals/Plan     Row Name 08/04/20 1152          Bed Mobility Goal 1 (PT)    Activity/Assistive Device (Bed Mobility Goal 1, PT)  bed mobility activities, all  -VG     [place] Level/Cues Needed (Bed Mobility Goal 1, PT)  independent  -VG     Time Frame (Bed Mobility Goal 1, PT)  long term goal (LTG);2 weeks  -VG     Row Name 08/04/20 1152          Transfer Goal 1 (PT)    Activity/Assistive Device (Transfer Goal 1, PT)  sit-to-stand/stand-to-sit;cane, straight  -VG     [place] Level/Cues Needed (Transfer Goal 1, PT)  conditional independence  -VG     Time Frame (Transfer Goal 1, PT)  long term goal (LTG);2 weeks  -VG     Row Name 08/04/20 1152          Gait Training Goal 1 (PT)    Activity/Assistive Device (Gait Training Goal 1, PT)  gait (walking locomotion);cane, straight  -VG     [place] Level (Gait Training Goal 1, PT)  conditional independence  -VG     Distance (Gait Goal 1, PT)  300  -VG     Row Name 08/04/20 1152          Patient Education Goal (PT)    Activity (Patient Education Goal, PT)  HEP  -VG     [place]/Cues/Accuracy (Memory Goal 2, PT)  independent  -VG     Time Frame (Patient Education Goal, PT)  long term  goal (LTG);2 weeks  -VG       User Key  (r) = Recorded By, (t) = Taken By, (c) = Cosigned By    Initials Name Provider Type    VG Vianca York, PT Physical Therapist        Clinical Impression     Row Name 08/04/20 1147          Pain Assessment    Additional Documentation  Pain Scale: Numbers Pre/Post-Treatment (Group)  -VG     Row Name 08/04/20 1147          Pain Scale: Numbers Pre/Post-Treatment    Pain Scale: Numbers, Pretreatment  0/10 - no pain  -VG     Pain Scale: Numbers, Post-Treatment  0/10 - no pain  -VG     [facility] Name 08/04/20 1147          Plan of Care Review    Plan of Care Reviewed With  patient  -VG     Outcome Summary  IP PT eval completed. Pt ambulated 150 ft with CGA-BERNIE and SPC. Distance limited by fatigue, weakness. 1 minor LOB requiring BERNIE for recovery. Pt states balance impairment is baseline gait. Demonstrates slightly impaired endurance, balance, strength, and coordination. Recommend appropriate for home with assist upon d/c. Will continue to progress pt as able per POC.  -VG     Row Name 08/04/20 1147          Physical Therapy Clinical Impression    Criteria for Skilled Interventions Met (PT Clinical Impression)  yes;treatment indicated  -VG     Rehab Potential (PT Clinical Summary)  good, to achieve stated therapy goals  -VG     Row Name 08/04/20 1147          Vital Signs    Pre Systolic BP Rehab  118  -VG     Pre Treatment Diastolic BP  71  -VG     Post Systolic BP Rehab  155  -VG     Post Treatment Diastolic BP  73  -VG     Pretreatment Heart Rate (beats/min)  104  -VG     Posttreatment Heart Rate (beats/min)  111  -VG     Pre SpO2 (%)  95  -VG     O2 Delivery Pre Treatment  room air  -VG     Post SpO2 (%)  97  -VG     O2 Delivery Post Treatment  room air  -VG     Pre Patient Position  Supine  -VG     Post Patient Position  Sitting  -VG     Row Name 08/04/20 1148          Positioning and Restraints    Pre-Treatment Position  in bed  -VG     Post Treatment Position  chair  -VG     In  Chair  reclined;call light within reach;encouraged to call for assist;exit alarm on;waffle cushion;legs elevated;heels elevated  -VG       User Key  (r) = Recorded By, (t) = Taken By, (c) = Cosigned By    Initials Name Provider Type    Vianca Cabrales PT Physical Therapist        Outcome Measures     Row Name 08/04/20 1153          How much help from another person do you currently need...    Turning from your back to your side while in flat bed without using bedrails?  4  -VG     Moving from lying on back to sitting on the side of a flat bed without bedrails?  4  -VG     Moving to and from a bed to a chair (including a wheelchair)?  3  -VG     Standing up from a chair using your arms (e.g., wheelchair, bedside chair)?  3  -VG     Climbing 3-5 steps with a railing?  3  -VG     To walk in hospital room?  3  -VG     AM-PAC 6 Clicks Score (PT)  20  -VG     Row Name 08/04/20 1153          Functional Assessment    Outcome Measure Options  AM-PAC 6 Clicks Basic Mobility (PT)  -VG       User Key  (r) = Recorded By, (t) = Taken By, (c) = Cosigned By    Initials Name Provider Type    Vianca Cabrales PT Physical Therapist        Physical Therapy Education                 Title: PT OT SLP Therapies (Not Started)     Topic: Physical Therapy (In Progress)     Point: Mobility training (Done)     Description:   Instruct learner(s) on safety and technique for assisting patient out of bed, chair or wheelchair.  Instruct in the proper use of assistive devices, such as walker, crutches, cane or brace.              Patient Friendly Description:   It's important to get you on your feet again, but we need to do so in a way that is safe for you. Falling has serious consequences, and your personal safety is the most important thing of all.        When it's time to get out of bed, one of us or a family member will sit next to you on the bed to give you support.     If your doctor or nurse tells you to use a walker, crutches, a  cane, or a brace, be sure you use it every time you get out of bed, even if you think you don't need it.    Learning Progress Summary           Patient Acceptance, E, VU by  at 8/4/2020 1154                   Point: Home exercise program (Not Started)     Description:   Instruct learner(s) on appropriate technique for monitoring, assisting and/or progressing patient with therapeutic exercises and activities.              Learner Progress:   Not documented in this visit.          Point: Body mechanics (Done)     Description:   Instruct learner(s) on proper positioning and spine alignment for patient and/or caregiver during mobility tasks and/or exercises.              Learning Progress Summary           Patient Acceptance, E, VU by  at 8/4/2020 1154                   Point: Precautions (Done)     Description:   Instruct learner(s) on prescribed precautions during mobility and gait tasks              Learning Progress Summary           Patient Acceptance, E, VU by  at 8/4/2020 1154                               User Key     Initials Effective Dates Name Provider Type Discipline     05/29/18 -  Vianca York, PT Physical Therapist PT              PT Recommendation and Plan  Planned Therapy Interventions (PT Eval): balance training, bed mobility training, gait training, home exercise program, motor coordination training, strengthening, transfer training  Outcome Summary/Treatment Plan (PT)  Anticipated Discharge Disposition (PT): home with assist  Plan of Care Reviewed With: patient  Outcome Summary: IP PT eval completed. Pt ambulated 150 ft with CGA-BERNIE and SPC. Distance limited by fatigue, weakness. 1 minor LOB requiring BERNIE for recovery. Pt states balance impairment is baseline gait. Demonstrates slightly impaired endurance, balance, strength, and coordination. Recommend appropriate for home with assist upon d/c. Will continue to progress pt as able per POC.     Time Calculation:   PT Charges     Row  Name 08/04/20 1055             Time Calculation    Start Time  1055  -VG      PT Received On  08/04/20  -VG      PT Goal Re-Cert Due Date  08/14/20  -VG         Time Calculation- PT    Total Timed Code Minutes- PT  15 minute(s)  -VG         Timed Charges    01466 - Gait Training Minutes   15  -VG        User Key  (r) = Recorded By, (t) = Taken By, (c) = Cosigned By    Initials Name Provider Type    VG Vianca York, PT Physical Therapist        Therapy Charges for Today     Code Description Service Date Service Provider Modifiers Qty    31240479380 HC GAIT TRAINING EA 15 MIN 8/4/2020 Vianca York, PT GP 1    71530145689 HC PT EVAL LOW COMPLEXITY 4 8/4/2020 Vianca York, PT GP 1          PT G-Codes  Outcome Measure Options: AM-PAC 6 Clicks Basic Mobility (PT)  AM-PAC 6 Clicks Score (PT): 20    Juana York PT  8/4/2020

## 2020-08-04 NOTE — PLAN OF CARE
Problem: Patient Care Overview  Goal: Plan of Care Review  Flowsheets (Taken 8/4/2020 1760)  Plan of Care Reviewed With: patient  Outcome Summary: IP PT eval completed. Pt ambulated 150 ft with CGA-BERNIE and SPC. Distance limited by fatigue, weakness. 1 minor LOB requiring BERNIE for recovery. Pt states balance impairment is baseline gait. Demonstrates slightly impaired endurance, balance, strength, and coordination. Recommend appropriate for home with assist upon d/c. Will continue to progress pt as able per POC.

## 2020-08-04 NOTE — PLAN OF CARE
Problem: Patient Care Overview  Goal: Plan of Care Review  Outcome: Ongoing (interventions implemented as appropriate)  Flowsheets (Taken 8/4/2020 9774)  Progress: improving  Plan of Care Reviewed With: patient  Outcome Summary: OT evaluation completed.  Pt. ambulated 200+ feet.  CGA given for evaluation purposes, but no assist needed.  Pt. noted less steady as fatigued.  Pt. demonstrated I donning and doffing socks and CI with transfers.  Bathroom safety AD discussed and recommendations for home.  No skilled OT services needed.  Recommend home with family assist.

## 2020-08-04 NOTE — PLAN OF CARE
Problem: Patient Care Overview  Goal: Plan of Care Review  Outcome: Ongoing (interventions implemented as appropriate)  Flowsheets (Taken 8/4/2020 0610)  Progress: improving  Plan of Care Reviewed With: patient  Outcome Summary: Pt A/Ox4. No syncopal episode noted during shift. Monitor shows SR/ST; No CP or SOA; No acute distress noted. VSS; Will cont with plan of care

## 2020-08-04 NOTE — PROGRESS NOTES
[facility] Medicine Services  PROGRESS NOTE    Patient Name: Denzel Harding  : 1960  MRN: [id number]    Date of Admission: 8/3/2020  Primary Care Physician: Isaura Godoy MD    Subjective   Subjective     CC:  F/u pre-syncope    HPI:  Patient resting in bed. He feels much better today. No further episodes like yesterday    Review of Systems  Gen- No fevers, chills  CV- No chest pain, palpitations  Resp- No cough, dyspnea  GI- No N/V/D, abd pain    Objective   Objective     Vital Signs:   Temp:  [97.2 °F (36.2 °C)-98.2 °F (36.8 °C)] 98.1 °F (36.7 °C)  Heart Rate:  [] 104  Resp:  [16-22] 18  BP: ()/(48-78) 118/71  Total (NIH Stroke Scale): 2     Physical Exam:  Constitutional: No acute distress, awake, alert, morbidly obese male  HENT: NCAT, mucous membranes moist  Respiratory: Clear to auscultation bilaterally, respiratory effort normal   Cardiovascular: RRR, no murmurs, rubs, or gallops, palpable pedal pulses bilaterally  Gastrointestinal: Positive bowel sounds, soft, nontender, nondistended  Musculoskeletal: No bilateral ankle edema  Psychiatric: Appropriate affect, cooperative  Neurologic: Oriented x 3, strength symmetric in all extremities, Cranial Nerves grossly intact to confrontation, speech clear  Skin: No rashes      Results Reviewed:  Results from last 7 days   Lab Units 20  0733 20  1337   WBC 10*3/mm3 7.70 8.74   HEMOGLOBIN g/dL 12.2* 14.1   HEMATOCRIT % 37.5 43.1   PLATELETS 10*3/mm3 205 254   PROCALCITONIN ng/mL  --  0.18     Results from last 7 days   Lab Units 20  0733 20  1337   SODIUM mmol/L 134* 134*   POTASSIUM mmol/L 4.1 4.0   CHLORIDE mmol/L 99 96*   CO2 mmol/L 25.0 27.0   BUN mg/dL 14 13   CREATININE mg/dL 0.91 1.69*   GLUCOSE mg/dL 257* 153*   CALCIUM mg/dL 8.9 9.5   ALT (SGPT) U/L 56* 74*   AST (SGOT) U/L 55* 76*   TROPONIN T ng/mL  --  0.021   PROBNP pg/mL  --  42.8     Estimated Creatinine Clearance: 121.4 mL/min (by  C-G formula based on SCr of 0.91 mg/dL).    Microbiology Results Abnormal     Procedure Component Value - Date/Time    Respiratory Panel PCR w/COVID-19(SARS-CoV-2) ART/GEOFF/SOFY In-House, NP Swab in UTM/VTP, 8-12 Hr TAT - Swab, Nasopharynx [[id number]]  (Normal) Collected:  08/03/20 1737    Lab Status:  Final result Specimen:  Swab from Nasopharynx Updated:  08/03/20 1902     ADENOVIRUS, PCR Not Detected     Coronavirus 229E Not Detected     Coronavirus HKU1 Not Detected     Coronavirus NL63 Not Detected     Coronavirus OC43 Not Detected     COVID19 Not Detected     Human Metapneumovirus Not Detected     Human Rhinovirus/Enterovirus Not Detected     Influenza A PCR Not Detected     Influenza A H1 Not Detected     Influenza A H1 2009 PCR Not Detected     Influenza A H3 Not Detected     Influenza B PCR Not Detected     Parainfluenza Virus 1 Not Detected     Parainfluenza Virus 2 Not Detected     Parainfluenza Virus 3 Not Detected     Parainfluenza Virus 4 Not Detected     RSV, PCR Not Detected     Bordetella pertussis pcr Not Detected     Bordetella parapertussis PCR Not Detected     Chlamydophila pneumoniae PCR Not Detected     Mycoplasma pneumo by PCR Not Detected    Narrative:       Fact sheet for providers: https://docs.[employer]/wp-content/uploads/JEU0109-4312-VU9.1-EUA-Provider-Fact-Sheet-3.pdf    Fact sheet for patients: https://docs.[employer]/wp-content/uploads/JPQ0272-5333-NU8.1-EUA-Patient-Fact-Sheet-1.pdf          Imaging Results (Last 24 Hours)     Procedure Component Value Units Date/Time    CT Cerebral Perfusion With & Without Contrast [[id number]] Collected:  08/03/20 1519     Updated:  08/04/20 1036    Narrative:       EXAMINATION: CT CEREBRAL PERFUSION W WO CONTRAST- 08/03/2020     INDICATION: ataxia, vision loss     TECHNIQUE: Cerebral perfusion analysis was performed using computed  tomography with contrast administration, including post processing of  parametric maps with determination of  cerebral blood flow, cerebral  blood volume, and mean transit time.     The radiation dose reduction device was turned on for each scan per the  ALARA (As Low as Reasonably Achievable) protocol.     COMPARISON: NONE.     FINDINGS: Perfusion maps demonstrate symmetric blood volume, relative  flow and mean transit time without evidence of core infarct or tissue at  risk.       Impression:       Perfusion maps demonstrate symmetric blood volume, relative  flow and mean transit time without evidence of core infarct or tissue at  risk.     D:  08/03/2020  E:  08/04/2020       MRI Brain Without Contrast [[id number]] Collected:  08/04/20 0756     Updated:  08/04/20 0805    Narrative:          EXAMINATION: MRI BRAIN WO CONTRAST-      INDICATION: Syncope/fainting; I95.9-Hypotension, unspecified;  R55-Syncope and collapse; R42-Dizziness and giddiness; R56.9-Unspecified  convulsions      Technique: Multiplanar multisequence MRI of the brain without contrast     COMPARISON: October 15, 2019     FINDINGS: No acute infarct on diffusion. No acute hemorrhage, mass or  mass effect. Mild typical periventricular white matter changes of  chronic small vessel ischemia. Status post bilateral lens replacements.  Orbits otherwise normal. Mild left frontal sinus mucosal thickening.  Paranasal sinuses are otherwise grossly clear.       Impression:       No evidence of acute ischemia, hemorrhage, mass or mass  effect.     Left frontal sinus mucosal disease can be correlated with any clinical  signs sinusitis.     This report was finalized on 8/4/2020 8:02 AM by Amaury Fuentes.       CT Angiogram Neck [[id number]] Collected:  08/03/20 1456     Updated:  08/03/20 1730    Narrative:       EXAMINATION: CT ANGIOGRAM NECK-, CT ANGIOGRAM HEAD- 08/03/2020     INDICATION: dizzy, right lateral visual field deficit     TECHNIQUE: Axial arterial phase postcontrast CT of the head and neck  with 3-D reconstructions.     The radiation dose reduction device  was turned on for each scan per the  ALARA (As Low as Reasonably Achievable) protocol.     COMPARISON: NONE     FINDINGS: The imaged lung apices are clear. The aortic arch is mildly  atherosclerotic with great vessel origins patent. Bilateral carotid  bifurcation/internal carotid artery origins are atherosclerotic with  less than 25%, mild associated narrowing. Bilateral vertebral arteries  are normal in course and caliber without evidence of significant  stenosis, occlusion or aneurysmal dilatation.     Bilateral proximal intracranial internal carotid arteries are  atherosclerotic with mild associated narrowing. Otherwise, bilateral  anterior, middle and posterior cerebral arteries demonstrate minimal  atherosclerotic change without significant associated narrowing.  Bilateral proximal intracranial vertebral arteries are atherosclerotic  without significant associated narrowing. The dural venous sinuses are  relatively well-opacified and appear patent.       Impression:       Minimal cervical carotid and intracranial atherosclerotic  disease without significant associated narrowing. No large vessel  occlusion, high-grade stenosis or aneurysmal dilatation.     D:  08/03/2020  E:  08/03/2020     This report was finalized on 8/3/2020 5:26 PM by Amaury Fuentes.       CT Angiogram Head [[id number]] Collected:  08/03/20 1456     Updated:  08/03/20 1729    Narrative:       EXAMINATION: CT ANGIOGRAM NECK-, CT ANGIOGRAM HEAD- 08/03/2020     INDICATION: dizzy, right lateral visual field deficit     TECHNIQUE: Axial arterial phase postcontrast CT of the head and neck  with 3-D reconstructions.     The radiation dose reduction device was turned on for each scan per the  ALARA (As Low as Reasonably Achievable) protocol.     COMPARISON: NONE     FINDINGS: The imaged lung apices are clear. The aortic arch is mildly  atherosclerotic with great vessel origins patent. Bilateral carotid  bifurcation/internal carotid artery origins  are atherosclerotic with  less than 25%, mild associated narrowing. Bilateral vertebral arteries  are normal in course and caliber without evidence of significant  stenosis, occlusion or aneurysmal dilatation.     Bilateral proximal intracranial internal carotid arteries are  atherosclerotic with mild associated narrowing. Otherwise, bilateral  anterior, middle and posterior cerebral arteries demonstrate minimal  atherosclerotic change without significant associated narrowing.  Bilateral proximal intracranial vertebral arteries are atherosclerotic  without significant associated narrowing. The dural venous sinuses are  relatively well-opacified and appear patent.       Impression:       Minimal cervical carotid and intracranial atherosclerotic  disease without significant associated narrowing. No large vessel  occlusion, high-grade stenosis or aneurysmal dilatation.     D:  08/03/2020  E:  08/03/2020     This report was finalized on 8/3/2020 5:26 PM by Amaury Fuentes.       XR Chest 1 View [[id number]] Collected:  08/03/20 1508     Updated:  08/03/20 1727    Narrative:       EXAMINATION: XR CHEST 1 VW- 08/03/2020     INDICATION: SOA triage protocol      COMPARISON: November 30, 2019     FINDINGS: Stable degree of cardiac enlargement. Lung volumes remain  diminished and there are vascular markings without overt edema. Probable  trace left pleural effusion. No pneumothorax. No new focal  consolidation.           Impression:       Stable exam with low lung volumes, cardiomegaly, trace left  pleural effusion and mild pulmonary vascular congestion without overt  edema.     D:  08/03/2020  E:  08/03/2020     This report was finalized on 8/3/2020 5:24 PM by Amaury Fuentes.       CT Chest Without Contrast [[id number]] Collected:  08/03/20 1710     Updated:  08/03/20 1717    Narrative:       EXAMINATION: CT CHEST WO CONTRAST-      INDICATION: hypotension, mild soa, abnormal CXR     TECHNIQUE: Axial noncontrast CT of the  chest with multiplanar reformats     The radiation dose reduction device was turned on for each scan per the  ALARA (As Low as Reasonably Achievable) protocol.     COMPARISON: October 28, 2017     FINDINGS: Multiple calcified thyroid nodules noted, partially imaged.  Normal course and caliber of the thoracic esophagus. Atherosclerotic  nonaneurysmal thoracic aorta. No bulky mediastinal or hilar adenopathy.  Three-vessel calcified coronary atherosclerosis. Imaged upper abdomen is  unremarkable. No aggressive soft tissue body wall or suspicious osseous  findings. Lung windows demonstrate no evidence of active lung  parenchymal infectious/inflammatory process or suspicious nodularity. No  definite pleural effusion. No pneumothorax.       Impression:       No acute process in the chest to account for dyspnea.     Coronary and aortic atherosclerosis as above.     Several small calcified thyroid nodules. These can be followed with  nonemergent ultrasound as indicated if not already performed.        This report was finalized on 8/3/2020 5:14 PM by Amaury Fuentes.       CT Head Without Contrast [[id number]] Collected:  08/03/20 1453     Updated:  08/03/20 1501    Narrative:       EXAMINATION: CT HEAD WO CONTRAST-      INDICATION: dizzy/lightheaded.  Right lateral visual field defect     TECHNIQUE: Axial noncontrast CT of the head with coronal reformats.     The radiation dose reduction device was turned on for each scan per the  ALARA (As Low as Reasonably Achievable) protocol.     COMPARISON: October 15, 2019     FINDINGS: No acute hemorrhage, mass or mass effect. Gray-white  differentiation appears grossly maintained without evidence of  territorial ischemia on noncontrast CT head. Mild periventricular white  matter changes of chronic small vessel ischemia and age appropriate  volume loss. Normal ventricular size and configuration. Normal orbits.  The paranasal sinuses are grossly clear. No acute  calvarial  abnormalities.       Impression:       No acute intracranial abnormality.        This report was finalized on 8/3/2020 2:56 PM by Amaury Fuentes.             Results for orders placed during the hospital encounter of 05/22/19   Adult Transthoracic Echo Complete W/ Cont if Necessary Per Protocol    Narrative Technically very limited study   1) Mild LVH with normal LV systolic function ( EF is over 55%)  2) Mild left atrial enlargement   3) Trace MR and TR   4) Normal RV function        I have reviewed the medications:  Scheduled Meds:  albuterol sulfate HFA 2 puff Inhalation 4x Daily - RT   amitriptyline 75 mg Oral Nightly   aspirin 81 mg Oral Daily   atorvastatin 80 mg Oral Nightly   Bromocriptine Mesylate 3.2 mg Oral Daily   budesonide-formoterol 2 puff Inhalation BID - RT   cetirizine 10 mg Oral Daily   clopidogrel 75 mg Oral Daily   fluticasone 1 spray Each Nare Daily   gabapentin 600 mg Oral Q12H   heparin (porcine) 5,000 Units Subcutaneous Q8H   insulin detemir 10 Units Subcutaneous Nightly   insulin lispro 0-9 Units Subcutaneous TID AC   ipratropium-albuterol 3 mL Nebulization 4x Daily - RT   pantoprazole 40 mg Oral QAM   sodium chloride 10 mL Intravenous Q12H   Vortioxetine HBr 20 mg Oral Daily   zafirlukast 20 mg Oral BID     Continuous Infusions:   PRN Meds:.•  acetaminophen **OR** acetaminophen **OR** acetaminophen  •  albuterol sulfate HFA  •  azelastine  •  butalbital-acetaminophen-caffeine  •  calcium carbonate EX  •  dextrose  •  dextrose  •  docusate sodium  •  glucagon (human recombinant)  •  HYDROcodone-acetaminophen  •  sodium chloride  •  sodium chloride    Assessment/Plan   Assessment & Plan     Active Hospital Problems    Diagnosis  POA   • Pre-syncope [R55]  Yes   • Lactic acidosis [E87.2]  Unknown   • Chronic diastolic CHF (congestive heart failure) (CMS/[facility]) [I50.32]  Yes   • COPD (chronic obstructive pulmonary disease) (CMS/[facility]) [J44.9]  Yes   • JUAN (acute kidney injury)  (CMS/[facility]) [N17.9]  Unknown   • Asthma [J45.909]  Yes   • Obstructive sleep apnea syndrome [G47.33]  Yes   • Chronic intractable headache [R51]  Yes   • CVA (S/P tPA) [I63.9]  Yes   • History of CVA (cerebrovascular accident) [Z86.73]  Not Applicable   • Hypertension [I10]  Yes   • Diabetes mellitus (CMS/[facility]) [E11.9]  Yes   • Diabetic polyneuropathy associated with type 2 diabetes mellitus (CMS/[facility]) [E11.42]  Yes      Resolved Hospital Problems   No resolved problems to display.        Brief Hospital Course to date:  Denzel Harding is a 60 y.o. male with history of DM, CHF, CKD 3, depression, h/o CVA, HTN, asthma, CAD s/p CABG who presented with an episode of pre-syncope.       Pre-syncope   AMS   - Likely related to excessive blood pressure medications causing hypotension, however patient gustafson shave history of CVA and TIA and questionable seizures   - CT perfusion with no LVO, MRI brain negative for ischemia  - EEG with evidence of seizure activity  - Hold BP medications, will resume gradually and monitor blood pressure  - COVID negative, okay to move off the floor  - PT/OT     Hypotension  H/O hypertension   - low BP on arrival s/p 2L fluids   - Holding home meds for now add back gradually with hod parameters - on lisinopril, amlodipine, aldactone,bumex, imdur at home     JUAN   - now normalized  - Holding lisinopril, bumex and aldactone   - s/p fluids; monitor      Lactic acidosis   - resolved     CAD s/p CABG   - continue ASA, statin, plavix      Asthma   COPD  - continue symbicort;   - COVID negative     DM   - holding home meds  - A1c 9.7  - increase to levemir 15 units; SSI      Mood  - Continue home trintellix, elavil      DVT prophylaxis:  Heparin     Disposition: I expect the patient to be discharged possibly tomorrow      CODE STATUS:   Code Status and Medical Interventions:   Ordered at: 08/03/20 1612     Level Of Support Discussed With:    Patient     Code Status:    CPR     Medical Interventions (Level  of Support Prior to Arrest):    Full         Electronically signed by Payton Ren DO, 08/04/20, 11:57.

## 2020-08-05 LAB
ANION GAP SERPL CALCULATED.3IONS-SCNC: 11 MMOL/L (ref 5–15)
BASOPHILS # BLD AUTO: 0.05 10*3/MM3 (ref 0–0.2)
BASOPHILS NFR BLD AUTO: 1.1 % (ref 0–1.5)
BUN SERPL-MCNC: 13 MG/DL (ref 6–20)
BUN/CREAT SERPL: 14.3 (ref 7–25)
CALCIUM SPEC-SCNC: 9.2 MG/DL (ref 8.6–10.5)
CHLORIDE SERPL-SCNC: 99 MMOL/L (ref 98–107)
CO2 SERPL-SCNC: 25 MMOL/L (ref 22–29)
CREAT SERPL-MCNC: 0.91 MG/DL (ref 0.76–1.27)
DEPRECATED RDW RBC AUTO: 43.5 FL (ref 37–54)
EOSINOPHIL # BLD AUTO: 0.2 10*3/MM3 (ref 0–0.4)
EOSINOPHIL NFR BLD AUTO: 4.3 % (ref 0.3–6.2)
ERYTHROCYTE [DISTWIDTH] IN BLOOD BY AUTOMATED COUNT: 14.9 % (ref 12.3–15.4)
GFR SERPL CREATININE-BSD FRML MDRD: 85 ML/MIN/1.73
GLUCOSE BLDC GLUCOMTR-MCNC: 334 MG/DL (ref 70–130)
GLUCOSE BLDC GLUCOMTR-MCNC: 349 MG/DL (ref 70–130)
GLUCOSE BLDC GLUCOMTR-MCNC: 355 MG/DL (ref 70–130)
GLUCOSE BLDC GLUCOMTR-MCNC: 392 MG/DL (ref 70–130)
GLUCOSE SERPL-MCNC: 375 MG/DL (ref 65–99)
HCT VFR BLD AUTO: 36.8 % (ref 37.5–51)
HGB BLD-MCNC: 11.8 G/DL (ref 13–17.7)
IMM GRANULOCYTES # BLD AUTO: 0.08 10*3/MM3 (ref 0–0.05)
IMM GRANULOCYTES NFR BLD AUTO: 1.7 % (ref 0–0.5)
LYMPHOCYTES # BLD AUTO: 1.17 10*3/MM3 (ref 0.7–3.1)
LYMPHOCYTES NFR BLD AUTO: 25.1 % (ref 19.6–45.3)
MCH RBC QN AUTO: 25.9 PG (ref 26.6–33)
MCHC RBC AUTO-ENTMCNC: 32.1 G/DL (ref 31.5–35.7)
MCV RBC AUTO: 80.9 FL (ref 79–97)
MONOCYTES # BLD AUTO: 0.25 10*3/MM3 (ref 0.1–0.9)
MONOCYTES NFR BLD AUTO: 5.4 % (ref 5–12)
NEUTROPHILS NFR BLD AUTO: 2.91 10*3/MM3 (ref 1.7–7)
NEUTROPHILS NFR BLD AUTO: 62.4 % (ref 42.7–76)
NRBC BLD AUTO-RTO: 0 /100 WBC (ref 0–0.2)
PLATELET # BLD AUTO: 182 10*3/MM3 (ref 140–450)
PMV BLD AUTO: 10.5 FL (ref 6–12)
POTASSIUM SERPL-SCNC: 4.7 MMOL/L (ref 3.5–5.2)
RBC # BLD AUTO: 4.55 10*6/MM3 (ref 4.14–5.8)
SODIUM SERPL-SCNC: 135 MMOL/L (ref 136–145)
WBC # BLD AUTO: 4.66 10*3/MM3 (ref 3.4–10.8)

## 2020-08-05 PROCEDURE — 99232 SBSQ HOSP IP/OBS MODERATE 35: CPT | Performed by: INTERNAL MEDICINE

## 2020-08-05 PROCEDURE — 94799 UNLISTED PULMONARY SVC/PX: CPT

## 2020-08-05 PROCEDURE — 25010000002 HEPARIN (PORCINE) PER 1000 UNITS: Performed by: INTERNAL MEDICINE

## 2020-08-05 PROCEDURE — 80048 BASIC METABOLIC PNL TOTAL CA: CPT | Performed by: INTERNAL MEDICINE

## 2020-08-05 PROCEDURE — 82962 GLUCOSE BLOOD TEST: CPT

## 2020-08-05 PROCEDURE — 85025 COMPLETE CBC W/AUTO DIFF WBC: CPT | Performed by: INTERNAL MEDICINE

## 2020-08-05 PROCEDURE — 63710000001 INSULIN LISPRO (HUMAN) PER 5 UNITS: Performed by: INTERNAL MEDICINE

## 2020-08-05 PROCEDURE — 63710000001 INSULIN DETEMIR PER 5 UNITS: Performed by: INTERNAL MEDICINE

## 2020-08-05 RX ORDER — BUMETANIDE 1 MG/1
2 TABLET ORAL DAILY
Status: DISCONTINUED | OUTPATIENT
Start: 2020-08-05 | End: 2020-08-06 | Stop reason: HOSPADM

## 2020-08-05 RX ORDER — SPIRONOLACTONE 25 MG/1
25 TABLET ORAL
Status: DISCONTINUED | OUTPATIENT
Start: 2020-08-05 | End: 2020-08-06 | Stop reason: HOSPADM

## 2020-08-05 RX ORDER — POTASSIUM CHLORIDE 750 MG/1
10 TABLET, EXTENDED RELEASE ORAL DAILY
COMMUNITY
End: 2020-08-19 | Stop reason: HOSPADM

## 2020-08-05 RX ORDER — METOPROLOL TARTRATE 50 MG/1
50 TABLET, FILM COATED ORAL EVERY 12 HOURS SCHEDULED
Status: DISCONTINUED | OUTPATIENT
Start: 2020-08-05 | End: 2020-08-05

## 2020-08-05 RX ORDER — METOPROLOL TARTRATE 50 MG/1
50 TABLET, FILM COATED ORAL 2 TIMES DAILY
Status: ON HOLD | COMMUNITY
End: 2020-08-06 | Stop reason: SDUPTHER

## 2020-08-05 RX ADMIN — METOPROLOL TARTRATE 50 MG: 50 TABLET, FILM COATED ORAL at 09:32

## 2020-08-05 RX ADMIN — HEPARIN SODIUM 5000 UNITS: 5000 INJECTION, SOLUTION INTRAVENOUS; SUBCUTANEOUS at 13:53

## 2020-08-05 RX ADMIN — CETIRIZINE HYDROCHLORIDE 10 MG: 10 TABLET, FILM COATED ORAL at 09:32

## 2020-08-05 RX ADMIN — SODIUM CHLORIDE, PRESERVATIVE FREE 10 ML: 5 INJECTION INTRAVENOUS at 09:34

## 2020-08-05 RX ADMIN — BUMETANIDE 2 MG: 1 TABLET ORAL at 09:32

## 2020-08-05 RX ADMIN — ALBUTEROL SULFATE 2 PUFF: 108 AEROSOL, METERED RESPIRATORY (INHALATION) at 12:21

## 2020-08-05 RX ADMIN — INSULIN LISPRO 7 UNITS: 100 INJECTION, SOLUTION INTRAVENOUS; SUBCUTANEOUS at 09:33

## 2020-08-05 RX ADMIN — SODIUM CHLORIDE, PRESERVATIVE FREE 10 ML: 5 INJECTION INTRAVENOUS at 21:16

## 2020-08-05 RX ADMIN — INSULIN DETEMIR 10 UNITS: 100 INJECTION, SOLUTION SUBCUTANEOUS at 21:16

## 2020-08-05 RX ADMIN — BROMOCRIPTINE MESYLATE 3.2 MG: 0.8 TABLET ORAL at 09:35

## 2020-08-05 RX ADMIN — GABAPENTIN 600 MG: 300 CAPSULE ORAL at 21:15

## 2020-08-05 RX ADMIN — METOPROLOL TARTRATE 25 MG: 25 TABLET, FILM COATED ORAL at 21:15

## 2020-08-05 RX ADMIN — SPIRONOLACTONE 25 MG: 25 TABLET ORAL at 09:32

## 2020-08-05 RX ADMIN — ASPIRIN 81 MG: 81 TABLET, COATED ORAL at 09:32

## 2020-08-05 RX ADMIN — PANTOPRAZOLE SODIUM 40 MG: 40 TABLET, DELAYED RELEASE ORAL at 05:52

## 2020-08-05 RX ADMIN — VORTIOXETINE 20 MG: 20 TABLET, FILM COATED ORAL at 09:35

## 2020-08-05 RX ADMIN — GABAPENTIN 600 MG: 300 CAPSULE ORAL at 09:32

## 2020-08-05 RX ADMIN — CLOPIDOGREL BISULFATE 75 MG: 75 TABLET ORAL at 09:33

## 2020-08-05 RX ADMIN — INSULIN LISPRO 8 UNITS: 100 INJECTION, SOLUTION INTRAVENOUS; SUBCUTANEOUS at 13:53

## 2020-08-05 RX ADMIN — ATORVASTATIN CALCIUM 80 MG: 40 TABLET, FILM COATED ORAL at 21:15

## 2020-08-05 RX ADMIN — ZAFIRLUKAST 20 MG: 20 TABLET, COATED ORAL at 09:32

## 2020-08-05 RX ADMIN — HEPARIN SODIUM 5000 UNITS: 5000 INJECTION, SOLUTION INTRAVENOUS; SUBCUTANEOUS at 21:21

## 2020-08-05 RX ADMIN — ALBUTEROL SULFATE 2 PUFF: 108 AEROSOL, METERED RESPIRATORY (INHALATION) at 16:14

## 2020-08-05 RX ADMIN — HEPARIN SODIUM 5000 UNITS: 5000 INJECTION, SOLUTION INTRAVENOUS; SUBCUTANEOUS at 05:52

## 2020-08-05 RX ADMIN — INSULIN LISPRO 8 UNITS: 100 INJECTION, SOLUTION INTRAVENOUS; SUBCUTANEOUS at 17:33

## 2020-08-05 RX ADMIN — AMITRIPTYLINE HYDROCHLORIDE 75 MG: 50 TABLET, FILM COATED ORAL at 21:15

## 2020-08-05 RX ADMIN — ZAFIRLUKAST 20 MG: 20 TABLET, COATED ORAL at 21:15

## 2020-08-05 RX ADMIN — LISINOPRIL 5 MG: 5 TABLET ORAL at 09:32

## 2020-08-05 RX ADMIN — ALBUTEROL SULFATE 2 PUFF: 108 AEROSOL, METERED RESPIRATORY (INHALATION) at 08:03

## 2020-08-05 RX ADMIN — SPIRONOLACTONE 25 MG: 25 TABLET ORAL at 17:39

## 2020-08-05 RX ADMIN — BUDESONIDE AND FORMOTEROL FUMARATE DIHYDRATE 2 PUFF: 80; 4.5 AEROSOL RESPIRATORY (INHALATION) at 08:03

## 2020-08-05 RX ADMIN — FLUTICASONE PROPIONATE 1 SPRAY: 50 SPRAY, METERED NASAL at 09:33

## 2020-08-05 RX ADMIN — BUDESONIDE AND FORMOTEROL FUMARATE DIHYDRATE 2 PUFF: 80; 4.5 AEROSOL RESPIRATORY (INHALATION) at 20:01

## 2020-08-05 RX ADMIN — ALBUTEROL SULFATE 2 PUFF: 108 AEROSOL, METERED RESPIRATORY (INHALATION) at 20:01

## 2020-08-05 NOTE — PROGRESS NOTES
Case Management Discharge Note      Final Note: Patient now has discharge orders placed.  No needs verbalized.  Patient plan is to discharge home today via car with family to transport.      Provided Post Acute Provider List?: N/A  N/A Provider List Comment: denies need    Destination      No service has been selected for the patient.      Durable Medical Equipment      No service has been selected for the patient.      Dialysis/Infusion      No service has been selected for the patient.      Home Medical Care      No service has been selected for the patient.      Therapy      No service has been selected for the patient.      Community Resources      No service has been selected for the patient.             Final Discharge Disposition Code: 01 - home or self-care

## 2020-08-05 NOTE — PROGRESS NOTES
Continued Stay Note  [facility]     Patient Name: Denzel Harding  MRN: [id number]  Today's Date: 8/5/2020    Admit Date: 8/3/2020    Discharge Plan     Row Name 08/05/20 1316       Plan    Plan  update    Patient/Family in Agreement with Plan  yes    Plan Comments  Per MD rounds, patient likely to discharge today.  Spoke with patient at bedside regarding discharge plan and discussed that he may be going home, he reports he wife will come to transport him.  No needs verbalized.  CM following.  Patient plan is to discharge home via car with family to transport.      Final Discharge Disposition Code  01 - home or self-care        Discharge Codes    No documentation.       Expected Discharge Date and Time     Expected Discharge Date Expected Discharge Time    Aug 5, 2020             June Velásquez RN

## 2020-08-05 NOTE — PLAN OF CARE
The patient remained calm and cooperative throughout the shift. The patient remained on room air. His blood pressure taken around 0700 was 173/77, the patient remained asymptomatic at this time. New medication orders were placed in the morning and given to the patient.  was notified of the high blood pressure in rounds. His blood pressure at 1132 was 149/75. Orthostatic blood pressures were done in the early afternoon, the patient reported felling lightheaded after they were taken and that he did not feel ready to be discharged.  was notified of this and his discharge orders were cancelled. No issues have arose since the orthostatic blood pressures. Will continue POC.

## 2020-08-05 NOTE — PLAN OF CARE
Problem: Patient Care Overview  Goal: Plan of Care Review  Outcome: Ongoing (interventions implemented as appropriate)  Flowsheets  Taken 8/5/2020 0343 by Aletha Cagle RN  Progress: improving  Taken 8/4/2020 1434 by Yasmin Herbert, ALIS  Plan of Care Reviewed With: patient  Note:   Pt VSS, no complaints of pain. Seems to be resting well throughout the shift. Utilizing call bell appropriately. Will cont to monitor.

## 2020-08-05 NOTE — PROGRESS NOTES
"Patient felt increasingly dizzy after his orthostatic blood pressure check. Felt like he was going to pass out \"my legs got all wobbly\". Did not feel comfortable discharging today SBP dropped from 140/82 to 110/39. Will decrease dose of Metoprolol to 25mg BID. Stopped Imdur. Continue aldactone,bumex, lisinopril. Repeat orthostatics in the AM. Plan for discharge tomorrow.  "

## 2020-08-05 NOTE — DISCHARGE SUMMARY
[facility] Medicine Services  DISCHARGE SUMMARY    Patient Name: Denzel Harding  : 1960  MRN: [id number]    Date of Admission: 8/3/2020 12:39 PM  Date of Discharge:  2020  Primary Care Physician: Isaura Godoy MD    Consults     No orders found from 2020 to 2020.          Hospital Course     Presenting Problem:   Pre-syncope [R55]  Pre-syncope [R55]    Active Hospital Problems    Diagnosis  POA   • Pre-syncope [R55]  Yes   • Lactic acidosis [E87.2]  Unknown   • Chronic diastolic CHF (congestive heart failure) (CMS/[facility]) [I50.32]  Yes   • COPD (chronic obstructive pulmonary disease) (CMS/[facility]) [J44.9]  Yes   • JUAN (acute kidney injury) (CMS/[facility]) [N17.9]  Unknown   • Asthma [J45.909]  Yes   • Obstructive sleep apnea syndrome [G47.33]  Yes   • Chronic intractable headache [R51]  Yes   • CVA (S/P tPA) [I63.9]  Yes   • History of CVA (cerebrovascular accident) [Z86.73]  Not Applicable   • Hypertension [I10]  Yes   • Diabetes mellitus (CMS/[facility]) [E11.9]  Yes   • Diabetic polyneuropathy associated with type 2 diabetes mellitus (CMS/[facility]) [E11.42]  Yes      Resolved Hospital Problems   No resolved problems to display.          Hospital Course:  Denzel Harding is a 60 y.o. male with history of DM, CHF, CKD 3, depression, h/o CVA, HTN, asthma, CAD s/p CABG who presented with an episode of pre-syncope. His COVID testing is negative.     Pre-syncope   AMS   - Likely related to excessive blood pressure medications causing hypotension as BP 70/40's on admission. BP medications were added back gradually  - CT perfusion with no Large vessel occlusion, MRI brain negative for ischemia, EEG without evidence of seizure activity  - His AMS has resolved and he has been stable since admission  - PT/OT signed off, patient doing well and safe for discharge home     Hypotension  H/O hypertension   - low BP on arrival (readings of 80/52 and 72/48) s/p 2L fluids with improvement. All blood pressure  medications and diuretics were held including lisinopril, aldactone, bumex, imdur and metoprolol. Patient reported he was no longer on norvasc due to low BPs  - His blood pressure medications were gradually re-added and orthostatics were check prior to discharge. He will continue bumex, lisinopril and aldactone at discharge. Imdur was held due to hypotension and positive orthostatic BP's. His metoprolol dose was decreased from 50mg BID to 25mg BID. He should resume these if clinically indicated, will plan for him to f/u with PCP at discharge. He will check his blood pressure every day and keep a log to take to his appointment.    JUAN   - now normalized with IVF and holding bumex, ACE, aldactone     Lactic acidosis   - resolved with IVF      Discharge Follow Up Recommendations for outpatient labs/diagnostics:   - f/u with PCP in one week for blood pressure check    Day of Discharge     HPI:   Patient resting in bed. No further episodes of confusion or dizziness or pre-syncope. Discussed possibility of dehydration in setting of multiple blood pressure medications that may have caused his symptoms. He has no chest pain, no SOA. No other complaints this morning.    Review of Systems  Gen- No fevers, chills  CV- No chest pain, palpitations  Resp- No cough, dyspnea  GI- No N/V/D, abd pain    Vital Signs:   Temp:  [97.9 °F (36.6 °C)-98.3 °F (36.8 °C)] 98.3 °F (36.8 °C)  Heart Rate:  [] 92  Resp:  [18-20] 18  BP: (110-184)/(39-97) 136/78     Physical Exam:  Constitutional: No acute distress, awake, alert, morbidly obese  HENT: NCAT, mucous membranes moist  Respiratory: Clear to auscultation bilaterally, respiratory effort normal   Cardiovascular: RRR, no murmurs, rubs, or gallops, palpable pedal pulses bilaterally  Gastrointestinal: Positive bowel sounds, soft, nontender, nondistended  Musculoskeletal: No bilateral ankle edema  Psychiatric: Appropriate affect, cooperative  Neurologic: Oriented x 3, strength symmetric  in all extremities, Cranial Nerves grossly intact to confrontation, speech clear  Skin: No rashes       Pertinent  and/or Most Recent Results     Results from last 7 days   Lab Units 08/05/20  0526 08/04/20  0733 08/03/20  1337   WBC 10*3/mm3 4.66 7.70 8.74   HEMOGLOBIN g/dL 11.8* 12.2* 14.1   HEMATOCRIT % 36.8* 37.5 43.1   PLATELETS 10*3/mm3 182 205 254   SODIUM mmol/L 135* 134* 134*   POTASSIUM mmol/L 4.7 4.1 4.0   CHLORIDE mmol/L 99 99 96*   CO2 mmol/L 25.0 25.0 27.0   BUN mg/dL 13 14 13   CREATININE mg/dL 0.91 0.91 1.69*   GLUCOSE mg/dL 375* 257* 153*   CALCIUM mg/dL 9.2 8.9 9.5     Results from last 7 days   Lab Units 08/04/20  0733 08/03/20  1337   BILIRUBIN mg/dL 0.9 1.0   ALK PHOS U/L 91 101   ALT (SGPT) U/L 56* 74*   AST (SGOT) U/L 55* 76*           Invalid input(s): TG, LDLCALC, LDLREALC  Results from last 7 days   Lab Units 08/04/20  0733 08/03/20  2108 08/03/20  1337   TSH uIU/mL  --   --  3.120   HEMOGLOBIN A1C % 9.70*  --   --    PROBNP pg/mL  --   --  42.8   TROPONIN T ng/mL  --   --  0.021   PROCALCITONIN ng/mL  --   --  0.18   LACTATE mmol/L  --  2.0 3.0*       Brief Urine Lab Results  (Last result in the past 365 days)      Color   Clarity   Blood   Leuk Est   Nitrite   Protein   CREAT   Urine HCG        08/03/20 1742 Yellow Clear Negative Negative Negative Negative               Microbiology Results Abnormal     Procedure Component Value - Date/Time    Blood Culture - Blood, Arm, Right [[id number]] Collected:  08/03/20 1345    Lab Status:  Preliminary result Specimen:  Blood from Arm, Right Updated:  08/04/20 1615     Blood Culture No growth at 24 hours    Blood Culture - Blood, Arm, Left [[id number]] Collected:  08/03/20 1335    Lab Status:  Preliminary result Specimen:  Blood from Arm, Left Updated:  08/04/20 5939     Blood Culture No growth at 24 hours    Respiratory Panel PCR w/COVID-19(SARS-CoV-2) ART/GEOFF/SOFY In-House, NP Swab in UTM/VTP, 8-12 Hr TAT - Swab, Nasopharynx [[id number]]  (Normal)  Collected:  08/03/20 1737    Lab Status:  Final result Specimen:  Swab from Nasopharynx Updated:  08/03/20 1902     ADENOVIRUS, PCR Not Detected     Coronavirus 229E Not Detected     Coronavirus HKU1 Not Detected     Coronavirus NL63 Not Detected     Coronavirus OC43 Not Detected     COVID19 Not Detected     Human Metapneumovirus Not Detected     Human Rhinovirus/Enterovirus Not Detected     Influenza A PCR Not Detected     Influenza A H1 Not Detected     Influenza A H1 2009 PCR Not Detected     Influenza A H3 Not Detected     Influenza B PCR Not Detected     Parainfluenza Virus 1 Not Detected     Parainfluenza Virus 2 Not Detected     Parainfluenza Virus 3 Not Detected     Parainfluenza Virus 4 Not Detected     RSV, PCR Not Detected     Bordetella pertussis pcr Not Detected     Bordetella parapertussis PCR Not Detected     Chlamydophila pneumoniae PCR Not Detected     Mycoplasma pneumo by PCR Not Detected    Narrative:       Fact sheet for providers: https://docs.[employer]/wp-content/uploads/EMM4088-5503-IW4.1-EUA-Provider-Fact-Sheet-3.pdf    Fact sheet for patients: https://docs.[employer]/wp-content/uploads/YRG1813-6911-OL9.1-EUA-Patient-Fact-Sheet-1.pdf          Imaging Results (All)     Procedure Component Value Units Date/Time    CT Cerebral Perfusion With & Without Contrast [[id number]] Collected:  08/03/20 1519     Updated:  08/04/20 1645    Narrative:       EXAMINATION: CT CEREBRAL PERFUSION W WO CONTRAST- 08/03/2020     INDICATION: ataxia, vision loss     TECHNIQUE: Cerebral perfusion analysis was performed using computed  tomography with contrast administration, including post processing of  parametric maps with determination of cerebral blood flow, cerebral  blood volume, and mean transit time.     The radiation dose reduction device was turned on for each scan per the  ALARA (As Low as Reasonably Achievable) protocol.     COMPARISON: NONE.     FINDINGS: Perfusion maps demonstrate symmetric blood  volume, relative  flow and mean transit time without evidence of core infarct or tissue at  risk.       Impression:       Perfusion maps demonstrate symmetric blood volume, relative  flow and mean transit time without evidence of core infarct or tissue at  risk.     D:  08/03/2020  E:  08/04/2020     This report was finalized on 8/4/2020 4:42 PM by Amaury Fuentes.       MRI Brain Without Contrast [[id number]] Collected:  08/04/20 0756     Updated:  08/04/20 0805    Narrative:          EXAMINATION: MRI BRAIN WO CONTRAST-      INDICATION: Syncope/fainting; I95.9-Hypotension, unspecified;  R55-Syncope and collapse; R42-Dizziness and giddiness; R56.9-Unspecified  convulsions      Technique: Multiplanar multisequence MRI of the brain without contrast     COMPARISON: October 15, 2019     FINDINGS: No acute infarct on diffusion. No acute hemorrhage, mass or  mass effect. Mild typical periventricular white matter changes of  chronic small vessel ischemia. Status post bilateral lens replacements.  Orbits otherwise normal. Mild left frontal sinus mucosal thickening.  Paranasal sinuses are otherwise grossly clear.       Impression:       No evidence of acute ischemia, hemorrhage, mass or mass  effect.     Left frontal sinus mucosal disease can be correlated with any clinical  signs sinusitis.     This report was finalized on 8/4/2020 8:02 AM by Amaury Fuentes.       CT Angiogram Neck [[id number]] Collected:  08/03/20 1456     Updated:  08/03/20 1730    Narrative:       EXAMINATION: CT ANGIOGRAM NECK-, CT ANGIOGRAM HEAD- 08/03/2020     INDICATION: dizzy, right lateral visual field deficit     TECHNIQUE: Axial arterial phase postcontrast CT of the head and neck  with 3-D reconstructions.     The radiation dose reduction device was turned on for each scan per the  ALARA (As Low as Reasonably Achievable) protocol.     COMPARISON: NONE     FINDINGS: The imaged lung apices are clear. The aortic arch is mildly  atherosclerotic with  great vessel origins patent. Bilateral carotid  bifurcation/internal carotid artery origins are atherosclerotic with  less than 25%, mild associated narrowing. Bilateral vertebral arteries  are normal in course and caliber without evidence of significant  stenosis, occlusion or aneurysmal dilatation.     Bilateral proximal intracranial internal carotid arteries are  atherosclerotic with mild associated narrowing. Otherwise, bilateral  anterior, middle and posterior cerebral arteries demonstrate minimal  atherosclerotic change without significant associated narrowing.  Bilateral proximal intracranial vertebral arteries are atherosclerotic  without significant associated narrowing. The dural venous sinuses are  relatively well-opacified and appear patent.       Impression:       Minimal cervical carotid and intracranial atherosclerotic  disease without significant associated narrowing. No large vessel  occlusion, high-grade stenosis or aneurysmal dilatation.     D:  08/03/2020  E:  08/03/2020     This report was finalized on 8/3/2020 5:26 PM by Amaury Fuentes.       CT Angiogram Head [[id number]] Collected:  08/03/20 1456     Updated:  08/03/20 1729    Narrative:       EXAMINATION: CT ANGIOGRAM NECK-, CT ANGIOGRAM HEAD- 08/03/2020     INDICATION: dizzy, right lateral visual field deficit     TECHNIQUE: Axial arterial phase postcontrast CT of the head and neck  with 3-D reconstructions.     The radiation dose reduction device was turned on for each scan per the  ALARA (As Low as Reasonably Achievable) protocol.     COMPARISON: NONE     FINDINGS: The imaged lung apices are clear. The aortic arch is mildly  atherosclerotic with great vessel origins patent. Bilateral carotid  bifurcation/internal carotid artery origins are atherosclerotic with  less than 25%, mild associated narrowing. Bilateral vertebral arteries  are normal in course and caliber without evidence of significant  stenosis, occlusion or aneurysmal  dilatation.     Bilateral proximal intracranial internal carotid arteries are  atherosclerotic with mild associated narrowing. Otherwise, bilateral  anterior, middle and posterior cerebral arteries demonstrate minimal  atherosclerotic change without significant associated narrowing.  Bilateral proximal intracranial vertebral arteries are atherosclerotic  without significant associated narrowing. The dural venous sinuses are  relatively well-opacified and appear patent.       Impression:       Minimal cervical carotid and intracranial atherosclerotic  disease without significant associated narrowing. No large vessel  occlusion, high-grade stenosis or aneurysmal dilatation.     D:  08/03/2020  E:  08/03/2020     This report was finalized on 8/3/2020 5:26 PM by Amaury Fuentes.       XR Chest 1 View [[id number]] Collected:  08/03/20 1508     Updated:  08/03/20 1727    Narrative:       EXAMINATION: XR CHEST 1 VW- 08/03/2020     INDICATION: SOA triage protocol      COMPARISON: November 30, 2019     FINDINGS: Stable degree of cardiac enlargement. Lung volumes remain  diminished and there are vascular markings without overt edema. Probable  trace left pleural effusion. No pneumothorax. No new focal  consolidation.           Impression:       Stable exam with low lung volumes, cardiomegaly, trace left  pleural effusion and mild pulmonary vascular congestion without overt  edema.     D:  08/03/2020  E:  08/03/2020     This report was finalized on 8/3/2020 5:24 PM by Amaury Fuentes.       CT Chest Without Contrast [[id number]] Collected:  08/03/20 1710     Updated:  08/03/20 1717    Narrative:       EXAMINATION: CT CHEST WO CONTRAST-      INDICATION: hypotension, mild soa, abnormal CXR     TECHNIQUE: Axial noncontrast CT of the chest with multiplanar reformats     The radiation dose reduction device was turned on for each scan per the  ALARA (As Low as Reasonably Achievable) protocol.     COMPARISON: October 28, 2017      FINDINGS: Multiple calcified thyroid nodules noted, partially imaged.  Normal course and caliber of the thoracic esophagus. Atherosclerotic  nonaneurysmal thoracic aorta. No bulky mediastinal or hilar adenopathy.  Three-vessel calcified coronary atherosclerosis. Imaged upper abdomen is  unremarkable. No aggressive soft tissue body wall or suspicious osseous  findings. Lung windows demonstrate no evidence of active lung  parenchymal infectious/inflammatory process or suspicious nodularity. No  definite pleural effusion. No pneumothorax.       Impression:       No acute process in the chest to account for dyspnea.     Coronary and aortic atherosclerosis as above.     Several small calcified thyroid nodules. These can be followed with  nonemergent ultrasound as indicated if not already performed.        This report was finalized on 8/3/2020 5:14 PM by Amaury Fuentes.       CT Head Without Contrast [[id number]] Collected:  08/03/20 1453     Updated:  08/03/20 1501    Narrative:       EXAMINATION: CT HEAD WO CONTRAST-      INDICATION: dizzy/lightheaded.  Right lateral visual field defect     TECHNIQUE: Axial noncontrast CT of the head with coronal reformats.     The radiation dose reduction device was turned on for each scan per the  ALARA (As Low as Reasonably Achievable) protocol.     COMPARISON: October 15, 2019     FINDINGS: No acute hemorrhage, mass or mass effect. Gray-white  differentiation appears grossly maintained without evidence of  territorial ischemia on noncontrast CT head. Mild periventricular white  matter changes of chronic small vessel ischemia and age appropriate  volume loss. Normal ventricular size and configuration. Normal orbits.  The paranasal sinuses are grossly clear. No acute calvarial  abnormalities.       Impression:       No acute intracranial abnormality.        This report was finalized on 8/3/2020 2:56 PM by Amaury Fuentes.             Results for orders placed during the hospital  encounter of 02/19/20   Duplex Upper Extremity Art / Grafts - Right CAR    Narrative · Normal right upper extremity arterial duplex, specifically there is   normal triphasic flow in the right radial artery with no evidence of   pseudoaneurysm or hematoma.          Results for orders placed during the hospital encounter of 02/19/20   Duplex Upper Extremity Art / Grafts - Right CAR    Narrative · Normal right upper extremity arterial duplex, specifically there is   normal triphasic flow in the right radial artery with no evidence of   pseudoaneurysm or hematoma.          Results for orders placed during the hospital encounter of 05/22/19   Adult Transthoracic Echo Complete W/ Cont if Necessary Per Protocol    Narrative Technically very limited study   1) Mild LVH with normal LV systolic function ( EF is over 55%)  2) Mild left atrial enlargement   3) Trace MR and TR   4) Normal RV function        Plan for Follow-up of Pending Labs/Results:    Order Current Status    Blood Culture - Blood, Arm, Left Preliminary result    Blood Culture - Blood, Arm, Right Preliminary result        Discharge Details        Discharge Medications      Changes to Medications      Instructions Start Date   flunisolide 25 MCG/ACT (0.025%) solution nasal spray  Commonly known as:  NASALIDE  What changed:    when to take this  reasons to take this   2 sprays, Inhalation, Every 12 Hours      HumaLOG KwikPen 100 UNIT/ML solution pen-injector  Generic drug:  Insulin Lispro (1 Unit Dial)  What changed:  how much to take   10 Units, Subcutaneous, 3 Times Daily With Meals, Follows SSI From PCP      metoprolol tartrate 50 MG tablet  Commonly known as:  LOPRESSOR  What changed:    how much to take  when to take this   25 mg, Oral, 2 Times Daily      Toujeo SoloStar 300 UNIT/ML solution pen-injector injection  Generic drug:  Insulin Glargine (1 Unit Dial)  What changed:    how much to take  when to take this   60 Units, Injection, Daily         Continue  These Medications      Instructions Start Date   albuterol sulfate  (90 Base) MCG/ACT inhaler  Commonly known as:  Ventolin HFA   2 puffs, Inhalation, Every 4 Hours PRN      amitriptyline 25 MG tablet  Commonly known as:  ELAVIL   75 mg, Oral, Every Night at Bedtime      Asmanex  MCG/ACT aerosol  Generic drug:  Mometasone Furoate   1 puff, Inhalation, 2 Times Daily      aspirin 81 MG EC tablet   81 mg, Oral, Daily      atorvastatin 80 MG tablet  Commonly known as:  LIPITOR   80 mg, Oral, Nightly      azelastine 0.1 % nasal spray  Commonly known as:  ASTELIN   1 spray, Nasal, 2 Times Daily PRN, Use in each nostril as directed      budesonide-formoterol 80-4.5 MCG/ACT inhaler  Commonly known as:  Symbicort   2 puffs, Inhalation, 2 Times Daily - RT, Rinse mouth with water after use.      bumetanide 2 MG tablet  Commonly known as:  BUMEX   2 mg, Oral, Daily, Take extra 2mg as needed for 3lb wt gain in 24hrs, increased shortness of breath      clopidogrel 75 MG tablet  Commonly known as:  PLAVIX   75 mg, Oral, Daily      coenzyme Q10 100 MG capsule   100 mg, Oral, Daily      Cycloset 0.8 MG tablet  Generic drug:  Bromocriptine Mesylate   3.2 mg, Oral, Every Morning      Dupixent 300 MG/2ML solution prefilled syringe  Generic drug:  Dupilumab   300 mg, Subcutaneous, Every 14 Days      gabapentin 600 MG tablet  Commonly known as:  NEURONTIN   600 mg, Oral, 3 Times Daily      ipratropium-albuterol 0.5-2.5 mg/3 ml nebulizer  Commonly known as:  DUO-NEB   3 mL, Nebulization, 4 Times Daily PRN      levocetirizine 5 MG tablet  Commonly known as:  XYZAL   5 mg, Oral, Every Evening      lisinopril 5 MG tablet  Commonly known as:  PRINIVIL,ZESTRIL   5 mg, Oral, Daily      meclizine 25 MG tablet  Commonly known as:  ANTIVERT   25 mg, Oral, 3 Times Daily PRN      omeprazole 20 MG capsule  Commonly known as:  priLOSEC   20 mg, Oral, Daily      Ozempic (0.25 or 0.5 MG/DOSE) 2 MG/1.5ML solution pen-injector  Generic drug:   Semaglutide(0.25 or 0.5MG/DOS)   0.5 mg, Subcutaneous, Weekly      potassium chloride 10 MEQ CR tablet  Commonly known as:  K-DUR,KLOR-CON   10 mEq, Oral, Daily      promethazine 25 MG tablet  Commonly known as:  PHENERGAN   25 mg, Oral, Every 6 Hours PRN      spironolactone 25 MG tablet  Commonly known as:  ALDACTONE   25 mg, Oral, Daily      Tiotropium Bromide Monohydrate 1.25 MCG/ACT aerosol solution inhaler  Commonly known as:  Spiriva Respimat   2 puffs, Inhalation, Daily      TiZANidine 4 MG capsule  Commonly known as:  Zanaflex   4 mg, Oral, Nightly PRN      TURMERIC PO   500 mg, Oral, 2 Times Daily      vitamin C 500 MG tablet  Commonly known as:  ASCORBIC ACID   500 mg, Oral, Daily      Vortioxetine HBr 20 MG tablet  Commonly known as:  Trintellix   20 mg, Oral, Daily      zafirlukast 20 MG tablet  Commonly known as:  Accolate   20 mg, Oral, 2 Times Daily         Stop These Medications    isosorbide mononitrate 60 MG 24 hr tablet  Commonly known as:  IMDUR            Allergies   Allergen Reactions   • Sulfa Antibiotics Anaphylaxis, Nausea And Vomiting and Delirium   • Invokana [Canagliflozin] Unknown - Low Severity     DKA   • Codeine Nausea And Vomiting     Can only take with Phenergan   • Morphine Unknown - Low Severity     Does not work. It causes pain         Discharge Disposition:      Diet:  Hospital:  Diet Order   Procedures   • Diet Regular; Consistent Carbohydrate       Activity:  - as tolerated    Restrictions or Other Recommendations:  - none       CODE STATUS:    Code Status and Medical Interventions:   Ordered at: 08/03/20 1612     Level Of Support Discussed With:    Patient     Code Status:    CPR     Medical Interventions (Level of Support Prior to Arrest):    Full       Future Appointments   Date Time Provider Department Center   8/12/2020 10:45 AM Petra Crowell MD [facility] None   8/25/2020  2:15 PM Isaura Godoy MD [facility] None   10/5/2020  9:30 AM Herson  ANTONIO Clayton [facility] None   10/6/2020  2:00 PM Isaura Godoy MD [facility] None                 Electronically signed by Payton Ren DO, 08/05/20, 1:25 PM.      Time Spent on Discharge:  I spent  35  minutes on this discharge activity which included: face-to-face encounter with the patient, reviewing the data in the system, coordination of the care with the nursing staff as well as consultants, documentation, and entering orders.

## 2020-08-06 ENCOUNTER — READMISSION MANAGEMENT (OUTPATIENT)
Dept: CALL CENTER | Facility: HOSPITAL | Age: 60
End: 2020-08-06

## 2020-08-06 VITALS
SYSTOLIC BLOOD PRESSURE: 136 MMHG | RESPIRATION RATE: 18 BRPM | HEIGHT: 70 IN | DIASTOLIC BLOOD PRESSURE: 78 MMHG | WEIGHT: 300 LBS | BODY MASS INDEX: 42.95 KG/M2 | OXYGEN SATURATION: 92 % | HEART RATE: 97 BPM | TEMPERATURE: 98.3 F

## 2020-08-06 LAB — GLUCOSE BLDC GLUCOMTR-MCNC: 336 MG/DL (ref 70–130)

## 2020-08-06 PROCEDURE — 94799 UNLISTED PULMONARY SVC/PX: CPT

## 2020-08-06 PROCEDURE — 99239 HOSP IP/OBS DSCHRG MGMT >30: CPT | Performed by: INTERNAL MEDICINE

## 2020-08-06 PROCEDURE — 63710000001 INSULIN LISPRO (HUMAN) PER 5 UNITS: Performed by: INTERNAL MEDICINE

## 2020-08-06 PROCEDURE — 25010000002 HEPARIN (PORCINE) PER 1000 UNITS: Performed by: INTERNAL MEDICINE

## 2020-08-06 PROCEDURE — 82962 GLUCOSE BLOOD TEST: CPT

## 2020-08-06 RX ORDER — METOPROLOL TARTRATE 50 MG/1
25 TABLET, FILM COATED ORAL 2 TIMES DAILY
Qty: 30 TABLET | Refills: 0 | Status: SHIPPED | OUTPATIENT
Start: 2020-08-06 | End: 2020-08-19 | Stop reason: HOSPADM

## 2020-08-06 RX ADMIN — GABAPENTIN 600 MG: 300 CAPSULE ORAL at 08:25

## 2020-08-06 RX ADMIN — INSULIN LISPRO 7 UNITS: 100 INJECTION, SOLUTION INTRAVENOUS; SUBCUTANEOUS at 08:34

## 2020-08-06 RX ADMIN — BROMOCRIPTINE MESYLATE 3.2 MG: 0.8 TABLET ORAL at 08:31

## 2020-08-06 RX ADMIN — BUMETANIDE 2 MG: 1 TABLET ORAL at 08:23

## 2020-08-06 RX ADMIN — SODIUM CHLORIDE, PRESERVATIVE FREE 10 ML: 5 INJECTION INTRAVENOUS at 08:25

## 2020-08-06 RX ADMIN — CETIRIZINE HYDROCHLORIDE 10 MG: 10 TABLET, FILM COATED ORAL at 08:23

## 2020-08-06 RX ADMIN — CLOPIDOGREL BISULFATE 75 MG: 75 TABLET ORAL at 08:24

## 2020-08-06 RX ADMIN — ASPIRIN 81 MG: 81 TABLET, COATED ORAL at 08:25

## 2020-08-06 RX ADMIN — ZAFIRLUKAST 20 MG: 20 TABLET, COATED ORAL at 08:23

## 2020-08-06 RX ADMIN — METOPROLOL TARTRATE 25 MG: 25 TABLET, FILM COATED ORAL at 08:26

## 2020-08-06 RX ADMIN — BUDESONIDE AND FORMOTEROL FUMARATE DIHYDRATE 2 PUFF: 80; 4.5 AEROSOL RESPIRATORY (INHALATION) at 07:24

## 2020-08-06 RX ADMIN — SPIRONOLACTONE 25 MG: 25 TABLET ORAL at 08:25

## 2020-08-06 RX ADMIN — PANTOPRAZOLE SODIUM 40 MG: 40 TABLET, DELAYED RELEASE ORAL at 05:39

## 2020-08-06 RX ADMIN — HEPARIN SODIUM 5000 UNITS: 5000 INJECTION, SOLUTION INTRAVENOUS; SUBCUTANEOUS at 05:39

## 2020-08-06 RX ADMIN — LISINOPRIL 5 MG: 5 TABLET ORAL at 08:25

## 2020-08-06 RX ADMIN — VORTIOXETINE 20 MG: 20 TABLET, FILM COATED ORAL at 08:31

## 2020-08-06 RX ADMIN — ALBUTEROL SULFATE 2 PUFF: 108 AEROSOL, METERED RESPIRATORY (INHALATION) at 07:25

## 2020-08-06 NOTE — PROGRESS NOTES
Case Management Discharge Note      Final Note: Spoke with patient at bedside regarding discharge plan who reports he is going home today and his wife is downstairs.  No discharge needs verbalized.  Patient plan is to discharge home today via car with family to transport.      Provided Post Acute Provider List?: N/A  N/A Provider List Comment: denies need    Destination      No service has been selected for the patient.      Durable Medical Equipment      No service has been selected for the patient.      Dialysis/Infusion      No service has been selected for the patient.      Home Medical Care      No service has been selected for the patient.      Therapy      No service has been selected for the patient.      Community Resources      No service has been selected for the patient.             Final Discharge Disposition Code: 01 - home or self-care

## 2020-08-06 NOTE — PROGRESS NOTES
[facility] Medicine Services  PROGRESS NOTE    Patient Name: Denzel Harding  : 1960  MRN: [id number]    Date of Admission: 8/3/2020  Primary Care Physician: Isaura Godoy MD    Subjective   Subjective     CC:  F/u pre-syncope    HPI:  Patient resting in bed.He has no complaints. Feels well. No further dizziness with orthostatic blood pressures.    Review of Systems  Gen- No fevers, chills  CV- No chest pain, palpitations  Resp- No cough, dyspnea  GI- No N/V/D, abd pain    Objective   Objective     Vital Signs:   Temp:  [97.9 °F (36.6 °C)-98.3 °F (36.8 °C)] 98.3 °F (36.8 °C)  Heart Rate:  [] 92  Resp:  [18-20] 18  BP: (110-184)/(39-97) 136/78  Total (NIH Stroke Scale): 2     Physical Exam:  Constitutional: No acute distress, awake, alert, morbidly obese male  HENT: NCAT, mucous membranes moist  Respiratory: Clear to auscultation bilaterally, respiratory effort normal   Cardiovascular: RRR, no murmurs, rubs, or gallops, palpable pedal pulses bilaterally  Gastrointestinal: Positive bowel sounds, soft, nontender, nondistended  Musculoskeletal: No bilateral ankle edema  Psychiatric: Appropriate affect, cooperative  Neurologic: Oriented x 3, strength symmetric in all extremities, Cranial Nerves grossly intact to confrontation, speech clear  Skin: No rashes      Results Reviewed:  Results from last 7 days   Lab Units 20  1337   WBC 10*3/mm3 4.66 7.70 8.74   HEMOGLOBIN g/dL 11.8* 12.2* 14.1   HEMATOCRIT % 36.8* 37.5 43.1   PLATELETS 10*3/mm3 182 205 254   PROCALCITONIN ng/mL  --   --  0.18     Results from last 7 days   Lab Units 20  0733 20  1337   SODIUM mmol/L 135* 134* 134*   POTASSIUM mmol/L 4.7 4.1 4.0   CHLORIDE mmol/L 99 99 96*   CO2 mmol/L 25.0 25.0 27.0   BUN mg/dL 13 14 13   CREATININE mg/dL 0.91 0.91 1.69*   GLUCOSE mg/dL 375* 257* 153*   CALCIUM mg/dL 9.2 8.9 9.5   ALT (SGPT) U/L  --  56* 74*   AST (SGOT)  U/L  --  55* 76*   TROPONIN T ng/mL  --   --  0.021   PROBNP pg/mL  --   --  42.8     Estimated Creatinine Clearance: 121.4 mL/min (by C-G formula based on SCr of 0.91 mg/dL).    Microbiology Results Abnormal     Procedure Component Value - Date/Time    Blood Culture - Blood, Arm, Right [[id number]] Collected:  08/03/20 1345    Lab Status:  Preliminary result Specimen:  Blood from Arm, Right Updated:  08/05/20 1615     Blood Culture No growth at 2 days    Blood Culture - Blood, Arm, Left [[id number]] Collected:  08/03/20 1335    Lab Status:  Preliminary result Specimen:  Blood from Arm, Left Updated:  08/05/20 1615     Blood Culture No growth at 2 days    Respiratory Panel PCR w/COVID-19(SARS-CoV-2) ART/GEOFF/SOFY In-House, NP Swab in UTM/VTP, 8-12 Hr TAT - Swab, Nasopharynx [[id number]]  (Normal) Collected:  08/03/20 1737    Lab Status:  Final result Specimen:  Swab from Nasopharynx Updated:  08/03/20 1902     ADENOVIRUS, PCR Not Detected     Coronavirus 229E Not Detected     Coronavirus HKU1 Not Detected     Coronavirus NL63 Not Detected     Coronavirus OC43 Not Detected     COVID19 Not Detected     Human Metapneumovirus Not Detected     Human Rhinovirus/Enterovirus Not Detected     Influenza A PCR Not Detected     Influenza A H1 Not Detected     Influenza A H1 2009 PCR Not Detected     Influenza A H3 Not Detected     Influenza B PCR Not Detected     Parainfluenza Virus 1 Not Detected     Parainfluenza Virus 2 Not Detected     Parainfluenza Virus 3 Not Detected     Parainfluenza Virus 4 Not Detected     RSV, PCR Not Detected     Bordetella pertussis pcr Not Detected     Bordetella parapertussis PCR Not Detected     Chlamydophila pneumoniae PCR Not Detected     Mycoplasma pneumo by PCR Not Detected    Narrative:       Fact sheet for providers: https://docs.[employer]/wp-content/uploads/GCH3410-3439-ZZ7.1-EUA-Provider-Fact-Sheet-3.pdf    Fact sheet for patients:  https://docs.[employer]/wp-content/uploads/NWZ3507-5610-YT3.1-EUA-Patient-Fact-Sheet-1.pdf          Imaging Results (Last 24 Hours)     ** No results found for the last 24 hours. **          Results for orders placed during the hospital encounter of 05/22/19   Adult Transthoracic Echo Complete W/ Cont if Necessary Per Protocol    Narrative Technically very limited study   1) Mild LVH with normal LV systolic function ( EF is over 55%)  2) Mild left atrial enlargement   3) Trace MR and TR   4) Normal RV function        I have reviewed the medications:  Scheduled Meds:    albuterol sulfate HFA 2 puff Inhalation 4x Daily - RT   amitriptyline 75 mg Oral Nightly   aspirin 81 mg Oral Daily   atorvastatin 80 mg Oral Nightly   Bromocriptine Mesylate 3.2 mg Oral Daily   budesonide-formoterol 2 puff Inhalation BID - RT   bumetanide 2 mg Oral Daily   cetirizine 10 mg Oral Daily   clopidogrel 75 mg Oral Daily   fluticasone 1 spray Each Nare Daily   gabapentin 600 mg Oral Q12H   heparin (porcine) 5,000 Units Subcutaneous Q8H   insulin detemir 10 Units Subcutaneous Nightly   insulin lispro 0-9 Units Subcutaneous TID AC   ipratropium-albuterol 3 mL Nebulization 4x Daily - RT   lisinopril 5 mg Oral Q24H   metoprolol tartrate 25 mg Oral Q12H   pantoprazole 40 mg Oral QAM   sodium chloride 10 mL Intravenous Q12H   spironolactone 25 mg Oral BID   Vortioxetine HBr 20 mg Oral Daily   zafirlukast 20 mg Oral BID     Continuous Infusions:   PRN Meds:.•  acetaminophen **OR** acetaminophen **OR** acetaminophen  •  albuterol sulfate HFA  •  azelastine  •  butalbital-acetaminophen-caffeine  •  calcium carbonate EX  •  dextrose  •  dextrose  •  docusate sodium  •  glucagon (human recombinant)  •  HYDROcodone-acetaminophen  •  sodium chloride  •  sodium chloride    Assessment/Plan   Assessment & Plan     Active Hospital Problems    Diagnosis  POA   • Pre-syncope [R55]  Yes   • Lactic acidosis [E87.2]  Unknown   • Chronic diastolic CHF  (congestive heart failure) (CMS/[facility]) [I50.32]  Yes   • COPD (chronic obstructive pulmonary disease) (CMS/[facility]) [J44.9]  Yes   • JUAN (acute kidney injury) (CMS/HCC) [N17.9]  Unknown   • Asthma [J45.909]  Yes   • Obstructive sleep apnea syndrome [G47.33]  Yes   • Chronic intractable headache [R51]  Yes   • CVA (S/P tPA) [I63.9]  Yes   • History of CVA (cerebrovascular accident) [Z86.73]  Not Applicable   • Hypertension [I10]  Yes   • Diabetes mellitus (CMS/[facility]) [E11.9]  Yes   • Diabetic polyneuropathy associated with type 2 diabetes mellitus (CMS/HCC) [E11.42]  Yes      Resolved Hospital Problems   No resolved problems to display.        Brief Hospital Course to date:  Denzel Harding is a 60 y.o. male with history of DM, CHF, CKD 3, depression, h/o CVA, HTN, asthma, CAD s/p CABG who presented with an episode of pre-syncope.       Pre-syncope   AMS   - Likely related to excessive blood pressure medications causing hypotension  - EEG with evidence of seizure activity  - BP medications reintroduced     Hypotension  H/O hypertension   - low BP on arrival s/p 2L fluids   - BP medications restarted with discontinuation of Imdur and decrease in Metoprolol to 25mg BID     JUAN   - now normalized  - s/p fluids; monitor      Lactic acidosis   - resolved     CAD s/p CABG   - continue ASA, statin, plavix      Asthma   COPD  - continue symbicort;   - COVID negative     DM   - holding home meds  - A1c 9.7  - increase to levemir 15 units; SSI      Mood  - Continue home trintellix, elavil      DVT prophylaxis:  Heparin     Disposition: I expect the patient to be discharged today      CODE STATUS:   Code Status and Medical Interventions:   Ordered at: 08/03/20 1612     Level Of Support Discussed With:    Patient     Code Status:    CPR     Medical Interventions (Level of Support Prior to Arrest):    Full         Electronically signed by Payton Ren DO, 08/06/20, 09:16.

## 2020-08-06 NOTE — PLAN OF CARE
Problem: Patient Care Overview  Goal: Plan of Care Review  Outcome: Ongoing (interventions implemented as appropriate)  Note:   VS remained stable throughout the night and patient remained on room air. No other changes noted throughout the night. Will continue to monitor and provide care.

## 2020-08-07 ENCOUNTER — TRANSITIONAL CARE MANAGEMENT TELEPHONE ENCOUNTER (OUTPATIENT)
Dept: CALL CENTER | Facility: HOSPITAL | Age: 60
End: 2020-08-07

## 2020-08-07 NOTE — OUTREACH NOTE
Prep Survey      Responses   [facility] patient discharged from?  [place]   Is LACE score < 7 ?  No   Eligibility  [facility]   Date of Admission  08/03/20   Date of Discharge  08/06/20   Discharge Disposition  Home or Self Care   Discharge diagnosis  AMS, pre-syncope, Hypotension   COVID-19 Test Status  Negative   Does the patient have one of the following disease processes/diagnoses(primary or secondary)?  Other   Does the patient have Home health ordered?  No   Is there a DME ordered?  No   Prep survey completed?  Yes          Glory Michele RN

## 2020-08-07 NOTE — OUTREACH NOTE
Call Center TCM Note      Responses   [facility] patient discharged from?  [place]   COVID-19 Test Status  Negative   Does the patient have one of the following disease processes/diagnoses(primary or secondary)?  Other   TCM attempt successful?  Yes   Call start time  1250   Call end time  1253   Discharge diagnosis  AMS, pre-syncope, Hypotension   Meds reviewed with patient/caregiver?  Yes   Is the patient having any side effects they believe may be caused by any medication additions or changes?  No   Does the patient have all medications ordered at discharge?  Yes   Is the patient taking all medications as directed (includes completed medication regime)?  Yes   Does the patient have a primary care provider?   Yes   Does the patient have an appointment with their PCP within 7 days of discharge?  Yes   Comments regarding PCP  f/u with Dr. Godoy on 8/13   Has the patient kept scheduled appointments due by today?  N/A   Psychosocial issues?  No   Did the patient receive a copy of their discharge instructions?  Yes   Nursing interventions  Reviewed instructions with patient   What is the patient's perception of their health status since discharge?  Improving   Is the patient/caregiver able to teach back the hierarchy of who to call/visit for symptoms/problems? PCP, Specialist, Home health nurse, Urgent Care, ED, 911  Yes   TCM call completed?  Yes   Wrap up additional comments  Patient says he is doing well, no questions or concerns at this time, confirmed appt with Dr. Godoy ofr 8/13.          Joana Gomez RN    8/7/2020, 12:53

## 2020-08-08 LAB
BACTERIA SPEC AEROBE CULT: NORMAL
BACTERIA SPEC AEROBE CULT: NORMAL

## 2020-08-09 ENCOUNTER — EPISODE CHANGES (OUTPATIENT)
Dept: CASE MANAGEMENT | Facility: OTHER | Age: 60
End: 2020-08-09

## 2020-08-11 NOTE — PROGRESS NOTES
Follow Up Note     Date: 2020   Patient Name: Denzel Harding  MRN: [id number]  : 1960     Referring Physician: Isaura Godoy MD    Chief Complaint:    Chief Complaint   Patient presents with   • Follow-up   • Elevated Hepatic Enzymes       Interval History:   2020  Denzel Harding is a 60 y.o. male who is here today for follow up after his recent colonoscopy.  He denies any abdominal pain no nausea vomiting.  Bowel movements regular now.   He is here to discuss the recent lab work done for his liver.  He is here also to discuss his pathology reports after polyps removed with a colonoscopy recently.    2020  Denzel Harding is a 60 y.o. male who is here today to establish care with Gastroenterology for  Colon cancer screening.   This patient deny any chronic abdominal pain except occasional RLQ  discomfort with occasional bloating. Deny any recent change in bowel habit, hematochezia or melena.  Normalyy will have BM once in 1-2 days, firm stool. Weight is stable. Pt denies nausea vomiting or odynophagia or dysphagia. There is a history of acid reflux, He is on ppi with good control.   There is no history of anemia. Prior history of EGD or colonoscopy in . Few polyps removed.  No family history of colon cancer or any GI malignancy. No history of any abdominal surgery except GB removed. Denies alcohol abuse or cigarette smoking.   He is on ASA and plavix. He had a CABG for CAD  He is on narco rarely.       Subjective      Past Medical History:   Past Medical History:   Diagnosis Date   • Anxiety    • Arthritis    • Asthma    • Brachial neuritis 2017   • Cellulitis     left arm and right leg    • Chest pain    • CHF (congestive heart failure) (CMS/HCC)     Patient reported history May 2020    • COPD (chronic obstructive pulmonary disease) (CMS/HCC)    • Coronary artery disease     Patient reported CABG , 3 vessel   • Depression    • Diabetes mellitus (CMS/HCC)     diagnosed  in 1998, checks fsbg 3-4x/day   • Dizziness    • Edema    • Elevated cholesterol    • GERD (gastroesophageal reflux disease)    • H/O chest x-ray 07/04/2015    Mild Left base atelectasis   • H/O echocardiogram 07/05/2015    Normal LVSF. EF of 60-65%. Grade 1 diastolic dysfunction of the LV myocardium. No evidence of pericardial effusion   • H/O exercise stress test    • Hearing loss     No use of hearing aids   • History of fracture     Reported 2 bones in left foot and a finger   • History of herniated intervertebral disc     History of left L5-S1 disc herniation   • History of pneumonia    • History of pneumonia    • Hypertension    • LOM (loss of memory) 1/19/2017   • Lower back pain     Right   • Measles    • Neuropathy     feet    • EDD treated with BiPAP     BiPAP HS - instructed to bring mask/machine DOS (setting 13 and 5)   • Palpitations    • Pericardial effusion    • Poor dentition     Patient reported missing multiple teeth   • Renal disorder    • Seizure disorder (CMS/HCC)    • Seizures (CMS/HCC)     FOCAL last 2009   • Shortness of breath 1/19/2017   • SOB (shortness of breath)    • Staph infection     back after sugery at the incision site    • Stroke (CMS/HCC)     x2 most recent , slight weakness in hands occasionaly    • Wears glasses      Past Surgical History:   Past Surgical History:   Procedure Laterality Date   • BACK SURGERY     • CARDIAC CATHETERIZATION     • CARDIAC CATHETERIZATION N/A 8/11/2017    Procedure: Coronary angiography;  Surgeon: Dallas Kim MD;  Location: [facility] CATH INVASIVE LOCATION;  Service:    • CARDIAC CATHETERIZATION N/A 8/11/2017    Procedure: Left Heart Cath;  Surgeon: Dallas Kim MD;  Location: [facility] CATH INVASIVE LOCATION;  Service:    • CARDIAC CATHETERIZATION N/A 8/11/2017    Procedure: Left ventriculography;  Surgeon: Dallas Kim MD;  Location: [facility] CATH INVASIVE LOCATION;  Service:    • CARDIAC CATHETERIZATION      2002 x1 stent,   x1 stent   • CARDIOVASCULAR STRESS TEST  07/03/2017    WITH DR HERNANDEZ AT [facility]   • CARPAL TUNNEL RELEASE Right    • CATARACT EXTRACTION W/ INTRAOCULAR LENS IMPLANT Right 6/12/2017    Procedure: CATARACT PHACO EXTRACTION WITH INTRAOCULAR LENS IMPLANT RIGHT ;  Surgeon: Natalie Cao MD;  Location: [facility] OR;  Service:    • CATARACT EXTRACTION W/ INTRAOCULAR LENS IMPLANT Left 7/10/2017    Procedure: CATARACT PHACO EXTRACTION WITH INTRAOCULAR LENS IMPLANT LEFT;  Surgeon: Natalie Cao MD;  Location: [facility] OR;  Service:    • CHOLECYSTECTOMY     • COLONOSCOPY     • COLONOSCOPY N/A 7/2/2020    Procedure: COLONOSCOPY;  Surgeon: Petra Crowell MD;  Location: [facility] ENDOSCOPY;  Service: Gastroenterology;  Laterality: N/A;  poor prep   • CORONARY ANGIOPLASTY WITH STENT PLACEMENT      X1-LAD   • CORONARY ARTERY BYPASS GRAFT N/A 8/15/2017    Procedure: CORONARY ARTERY BYPASS GRAFTING x 3 UTILIZING THE LEFT INTERNAL MAMMARY ARTERY WITH ENDOSCOPIC VEIN HARVESTING OF THE RIGHT GREATER SAPHENOUS VEIN, HAMLET, LAD ENDARECTOMY;  Surgeon: London Damon MD;  Location: [facility] OR;  Service:    • ENDOSCOPY     • EYE CAPSULOTOMY WITH LASER Right 11/25/2019    Procedure: EYE CAPSULOTOMY WITH LASER RIGHT;  Surgeon: Natalie Cao MD;  Location: [facility] OR;  Service: Ophthalmology   • EYE CAPSULOTOMY WITH LASER Left 12/9/2019    Procedure: EYE CAPSULOTOMY WITH LASER LEFT;  Surgeon: Natalie Cao MD;  Location: [facility] OR;  Service: Ophthalmology   • EYE SURGERY      cataract surgery both eyes   • INTERVENTIONAL RADIOLOGY PROCEDURE Bilateral 12/31/2019    Procedure: Carotid Cerebral Angiogram;  Surgeon: Damian Dubois MD;  Location: [facility] INVASIVE LOCATION;  Service: Interventional Radiology   • KNEE ARTHROSCOPY Bilateral    • KNEE ARTHROSCOPY Right 2010    Dr Lewis   • KNEE ARTHROSCOPY Left 2008    Dr Jameson   • MOUTH SURGERY      Oral extractions   • NEUROPLASTY / TRANSPOSITION ULNAR NERVE AT ELBOW Left    • OTHER SURGICAL  HISTORY      Foraminotomy and discectomy   • PERICARDIAL WINDOW N/A 2017    Procedure: PERICARDIAL WINDOW;  Surgeon: Freeman Phillips MD;  Location: [facility];  Service:        Family History:   Family History   Problem Relation Age of Onset   • Hypertension Mother    • Arthritis Mother    • Alcohol abuse Father    • Heart disease Other    • Stroke Other    • Hypertension Other    • Other Other         Neurologic disorder   • Parkinsonism Other    • Stroke Other    • Heart disease Other    • Hypertension Other    • Heart disease Other    • Stroke Other    • Hypertension Other        Social History:   Social History     Socioeconomic History   • Marital status:      Spouse name: Not on file   • Number of children: 1   • Years of education: Not on file   • Highest education level: Not on file   Occupational History   • Occupation: [employer]     Employer: DISABLED     Comment: Disabled since -Back Problems   Tobacco Use   • Smoking status: Former Smoker     Packs/day: 1.00     Years: 10.00     Pack years: 10.00     Types: Cigarettes     Last attempt to quit: 1998     Years since quittin.6   • Smokeless tobacco: Former User     Types: Snuff     Quit date:    Substance and Sexual Activity   • Alcohol use: Yes     Frequency: Monthly or less     Drinks per session: 3 or 4     Comment: 3 PER YEAR   • Drug use: No   • Sexual activity: Defer   Social History Narrative    Caffeine use: 0-1 serving daily.    Patient lives at home with wife.        Medications:     Current Outpatient Medications:   •  albuterol sulfate HFA (Ventolin HFA) 108 (90 Base) MCG/ACT inhaler, Inhale 2 puffs Every 4 (Four) Hours As Needed for Wheezing or Shortness of Air., Disp: 1 inhaler, Rfl: 4  •  amitriptyline (ELAVIL) 25 MG tablet, Take 3 tablets by mouth every night at bedtime., Disp: 270 tablet, Rfl: 0  •  ASMANEX  MCG/ACT aerosol, Inhale 1 puff 2 (Two) Times a Day., Disp: 1 inhaler, Rfl: 5  •   aspirin 81 MG EC tablet, Take 81 mg by mouth Daily., Disp: , Rfl:   •  atorvastatin (LIPITOR) 80 MG tablet, Take 1 tablet by mouth Every Night., Disp: 30 tablet, Rfl: 5  •  azelastine (ASTELIN) 0.1 % nasal spray, 1 spray into the nostril(s) as directed by provider 2 (Two) Times a Day As Needed for Rhinitis or Allergies. Use in each nostril as directed, Disp: 1 each, Rfl: 5  •  budesonide-formoterol (Symbicort) 80-4.5 MCG/ACT inhaler, Inhale 2 puffs 2 (Two) Times a Day. Rinse mouth with water after use., Disp: 1 inhaler, Rfl: 5  •  bumetanide (BUMEX) 2 MG tablet, Take 1 tablet by mouth Daily. Take extra 2mg as needed for 3lb wt gain in 24hrs, increased shortness of breath, Disp: 60 tablet, Rfl: 5  •  clopidogrel (PLAVIX) 75 MG tablet, Take 75 mg by mouth Daily., Disp: , Rfl:   •  coenzyme Q10 100 MG capsule, Take 100 mg by mouth Daily., Disp: , Rfl:   •  CYCLOSET 0.8 MG tablet, Take 3.2 mg by mouth Every Morning., Disp: , Rfl: 0  •  DUPIXENT 300 MG/2ML solution prefilled syringe, Inject 300 mg under the skin into the appropriate area as directed Every 14 (Fourteen) Days., Disp: 2 syringe, Rfl: 11  •  flunisolide (NASALIDE) 25 MCG/ACT (0.025%) solution nasal spray, Inhale 2 sprays Every 12 (Twelve) Hours. (Patient taking differently: Inhale 2 sprays 2 (Two) Times a Day As Needed.), Disp: 25 mL, Rfl: 2  •  gabapentin (NEURONTIN) 600 MG tablet, Take 600 mg by mouth 3 (Three) Times a Day., Disp: , Rfl: 0  •  HUMALOG KWIKPEN 100 UNIT/ML solution pen-injector, Inject 10 Units under the skin 3 (Three) Times a Day With Meals. Follows SSI From PCP (Patient taking differently: Inject 50 Units under the skin into the appropriate area as directed 3 (Three) Times a Day With Meals. Follows SSI From PCP), Disp: , Rfl: 0  •  ipratropium-albuterol (DUO-NEB) 0.5-2.5 mg/3 ml nebulizer, Take 3 mL by nebulization 4 (Four) Times a Day As Needed for Wheezing or Shortness of Air., Disp: 360 mL, Rfl: 5  •  levocetirizine (XYZAL) 5 MG tablet,  Take 1 tablet by mouth Every Evening., Disp: 90 tablet, Rfl: 1  •  lisinopril (PRINIVIL,ZESTRIL) 5 MG tablet, Take 5 mg by mouth Daily., Disp: , Rfl:   •  meclizine (ANTIVERT) 25 MG tablet, Take 1 tablet by mouth 3 (Three) Times a Day As Needed for dizziness., Disp: 10 tablet, Rfl: 0  •  metoprolol tartrate (LOPRESSOR) 50 MG tablet, Take 0.5 tablets by mouth 2 (Two) Times a Day., Disp: 30 tablet, Rfl: 0  •  omeprazole (priLOSEC) 20 MG capsule, Take 1 capsule by mouth Daily., Disp: 30 capsule, Rfl: 5  •  OZEMPIC, 0.25 OR 0.5 MG/DOSE, 2 MG/1.5ML solution pen-injector, Inject 0.5 mg under the skin into the appropriate area as directed 1 (One) Time Per Week., Disp: , Rfl:   •  potassium chloride (K-DUR,KLOR-CON) 10 MEQ CR tablet, Take 10 mEq by mouth Daily., Disp: , Rfl:   •  promethazine (PHENERGAN) 25 MG tablet, Take 1 tablet by mouth Every 6 (Six) Hours As Needed for Nausea or Vomiting., Disp: 20 tablet, Rfl: 0  •  spironolactone (ALDACTONE) 25 MG tablet, Take 1 tablet by mouth Daily., Disp: 30 tablet, Rfl: 5  •  Tiotropium Bromide Monohydrate (SPIRIVA RESPIMAT) 1.25 MCG/ACT aerosol solution inhaler, Inhale 2 puffs Daily., Disp: 1 inhaler, Rfl: 5  •  TiZANidine (Zanaflex) 4 MG capsule, Take 1 capsule by mouth At Night As Needed for Muscle Spasms., Disp: 30 capsule, Rfl: 5  •  TOUJEO SOLOSTAR 300 UNIT/ML solution pen-injector, Inject 60 Units as directed Daily. (Patient taking differently: Inject 120 Units as directed 2 (two) times a day.), Disp: , Rfl: 0  •  TURMERIC PO, Take 500 mg by mouth 2 (Two) Times a Day., Disp: , Rfl:   •  vitamin C (ASCORBIC ACID) 500 MG tablet, Take 500 mg by mouth Daily., Disp: , Rfl:   •  Vortioxetine HBr (Trintellix) 20 MG tablet, Take 20 mg by mouth Daily., Disp: 30 tablet, Rfl: 5  •  zafirlukast (Accolate) 20 MG tablet, Take 1 tablet by mouth 2 (Two) Times a Day., Disp: 60 tablet, Rfl: 5    Allergies:   Allergies   Allergen Reactions   • Sulfa Antibiotics Anaphylaxis, Nausea And  "Vomiting and Delirium   • Invokana [Canagliflozin] Unknown - Low Severity     DKA   • Codeine Nausea And Vomiting     Can only take with Phenergan   • Morphine Unknown - Low Severity     Does not work. It causes pain       Review of Systems:   Review of Systems   Constitutional: Negative for appetite change, fatigue and unexpected weight loss.   Gastrointestinal: Negative for abdominal distention, abdominal pain, anal bleeding, blood in stool, constipation, diarrhea, nausea, rectal pain, vomiting, GERD and indigestion.       The following portions of the patient's history were reviewed and updated as appropriate: allergies, current medications, past family history, past medical history, past social history, past surgical history and problem list.    Objective     Physical Exam:  Vital Signs:   Vitals:    08/12/20 1052   BP: 118/78   Pulse: 78   Resp: 18   Temp: 97.3 °F (36.3 °C)   TempSrc: Temporal   SpO2: 98%   Weight: 134 kg (296 lb)   Height: 177.8 cm (70\")       Physical Exam   Constitutional: He is oriented to person, place, and time. Vital signs are normal. He appears well-developed and well-nourished.   He is obese   HENT:   Head: Normocephalic and atraumatic.   Right Ear: External ear normal.   Left Ear: External ear normal.   Nose: Nose normal.   Mouth/Throat: Oropharynx is clear and moist.   Eyes: Conjunctivae are normal.   Neck: Normal range of motion. Neck supple.   Cardiovascular: Normal rate, regular rhythm and normal heart sounds.   Pulmonary/Chest: Effort normal and breath sounds normal.   Abdominal: Soft. Bowel sounds are normal. He exhibits no distension, no ascites and no mass. There is no tenderness.   Musculoskeletal: Normal range of motion.   Neurological: He is alert and oriented to person, place, and time.   Skin: Skin is warm and dry.   Psychiatric: He has a normal mood and affect. His behavior is normal. Judgment normal.   Nursing note and vitals reviewed.      Results Review:   I reviewed " the patient's new clinical results.    Admission on 08/03/2020, Discharged on 08/06/2020   Component Date Value Ref Range Status   • Glucose 08/03/2020 153* 65 - 99 mg/dL Final   • BUN 08/03/2020 13  6 - 20 mg/dL Final   • Creatinine 08/03/2020 1.69* 0.76 - 1.27 mg/dL Final   • Sodium 08/03/2020 134* 136 - 145 mmol/L Final   • Potassium 08/03/2020 4.0  3.5 - 5.2 mmol/L Final   • Chloride 08/03/2020 96* 98 - 107 mmol/L Final   • CO2 08/03/2020 27.0  22.0 - 29.0 mmol/L Final   • Calcium 08/03/2020 9.5  8.6 - 10.5 mg/dL Final   • Total Protein 08/03/2020 7.3  6.0 - 8.5 g/dL Final   • Albumin 08/03/2020 4.20  3.50 - 5.20 g/dL Final   • ALT (SGPT) 08/03/2020 74* 1 - 41 U/L Final   • AST (SGOT) 08/03/2020 76* 1 - 40 U/L Final   • Alkaline Phosphatase 08/03/2020 101  39 - 117 U/L Final   • Total Bilirubin 08/03/2020 1.0  0.0 - 1.2 mg/dL Final   • eGFR Non African Amer 08/03/2020 42* >60 mL/min/1.73 Final   • Globulin 08/03/2020 3.1  gm/dL Final   • A/G Ratio 08/03/2020 1.4  g/dL Final   • BUN/Creatinine Ratio 08/03/2020 7.7  7.0 - 25.0 Final   • Anion Gap 08/03/2020 11.0  5.0 - 15.0 mmol/L Final   • proBNP 08/03/2020 42.8  0.0 - 900.0 pg/mL Final   • Troponin T 08/03/2020 0.021  0.000 - 0.030 ng/mL Final   • Extra Tube 08/03/2020 hold for add-on   Final    Auto resulted   • Extra Tube 08/03/2020 Hold for add-ons.   Final    Auto resulted.   • Extra Tube 08/03/2020 hold for add-on   Final    Auto resulted   • Extra Tube 08/03/2020 Hold for add-ons.   Final    Auto resulted.   • WBC 08/03/2020 8.74  3.40 - 10.80 10*3/mm3 Final   • RBC 08/03/2020 5.40  4.14 - 5.80 10*6/mm3 Final   • Hemoglobin 08/03/2020 14.1  13.0 - 17.7 g/dL Final   • Hematocrit 08/03/2020 43.1  37.5 - 51.0 % Final   • MCV 08/03/2020 79.8  79.0 - 97.0 fL Final   • MCH 08/03/2020 26.1* 26.6 - 33.0 pg Final   • MCHC 08/03/2020 32.7  31.5 - 35.7 g/dL Final   • RDW 08/03/2020 14.6  12.3 - 15.4 % Final   • RDW-SD 08/03/2020 42.4  37.0 - 54.0 fl Final   • MPV  08/03/2020 9.9  6.0 - 12.0 fL Final   • Platelets 08/03/2020 254  140 - 450 10*3/mm3 Final   • Neutrophil % 08/03/2020 78.7* 42.7 - 76.0 % Final   • Lymphocyte % 08/03/2020 12.1* 19.6 - 45.3 % Final   • Monocyte % 08/03/2020 4.5* 5.0 - 12.0 % Final   • Eosinophil % 08/03/2020 2.3  0.3 - 6.2 % Final   • Basophil % 08/03/2020 0.6  0.0 - 1.5 % Final   • Immature Grans % 08/03/2020 1.8* 0.0 - 0.5 % Final   • Neutrophils, Absolute 08/03/2020 6.88  1.70 - 7.00 10*3/mm3 Final   • Lymphocytes, Absolute 08/03/2020 1.06  0.70 - 3.10 10*3/mm3 Final   • Monocytes, Absolute 08/03/2020 0.39  0.10 - 0.90 10*3/mm3 Final   • Eosinophils, Absolute 08/03/2020 0.20  0.00 - 0.40 10*3/mm3 Final   • Basophils, Absolute 08/03/2020 0.05  0.00 - 0.20 10*3/mm3 Final   • Immature Grans, Absolute 08/03/2020 0.16* 0.00 - 0.05 10*3/mm3 Final   • nRBC 08/03/2020 0.0  0.0 - 0.2 /100 WBC Final   • Color, UA 08/03/2020 Yellow  Yellow, Straw Final   • Appearance, UA 08/03/2020 Clear  Clear Final   • pH, UA 08/03/2020 <=5.0  5.0 - 8.0 Final   • Specific Gravity, UA 08/03/2020 1.015  1.001 - 1.030 Final   • Glucose, UA 08/03/2020 Negative  Negative Final   • Ketones, UA 08/03/2020 Negative  Negative Final   • Bilirubin, UA 08/03/2020 Negative  Negative Final   • Blood, UA 08/03/2020 Negative  Negative Final   • Protein, UA 08/03/2020 Negative  Negative Final   • Leuk Esterase, UA 08/03/2020 Negative  Negative Final   • Nitrite, UA 08/03/2020 Negative  Negative Final   • Urobilinogen, UA 08/03/2020 1.0 E.U./dL  0.2 - 1.0 E.U./dL Final   • TSH 08/03/2020 3.120  0.270 - 4.200 uIU/mL Final   • Procalcitonin 08/03/2020 0.18  0.00 - 0.25 ng/mL Final   • Blood Culture 08/03/2020 No growth at 5 days   Final   • Blood Culture 08/03/2020 No growth at 5 days   Final   • Lactate 08/03/2020 3.0* 0.5 - 2.0 mmol/L Final    Falsely depressed results may occur on samples drawn from patients receiving N-Acetylcysteine (NAC) or Metamizole.   • Hold Tube 08/03/2020 Hold  for add-ons.   Final    Auto resulted.   • ADENOVIRUS, PCR 08/03/2020 Not Detected  Not Detected Final   • Coronavirus 229E 08/03/2020 Not Detected  Not Detected Final   • Coronavirus HKU1 08/03/2020 Not Detected  Not Detected Final   • Coronavirus NL63 08/03/2020 Not Detected  Not Detected Final   • Coronavirus OC43 08/03/2020 Not Detected  Not Detected Final   • COVID19 08/03/2020 Not Detected  Not Detected - Ref. Range Final   • Human Metapneumovirus 08/03/2020 Not Detected  Not Detected Final   • Human Rhinovirus/Enterovirus 08/03/2020 Not Detected  Not Detected Final   • Influenza A PCR 08/03/2020 Not Detected  Not Detected Final   • Influenza A H1 08/03/2020 Not Detected  Not Detected Final   • Influenza A H1 2009 PCR 08/03/2020 Not Detected  Not Detected Final   • Influenza A H3 08/03/2020 Not Detected  Not Detected Final   • Influenza B PCR 08/03/2020 Not Detected  Not Detected Final   • Parainfluenza Virus 1 08/03/2020 Not Detected  Not Detected Final   • Parainfluenza Virus 2 08/03/2020 Not Detected  Not Detected Final   • Parainfluenza Virus 3 08/03/2020 Not Detected  Not Detected Final   • Parainfluenza Virus 4 08/03/2020 Not Detected  Not Detected Final   • RSV, PCR 08/03/2020 Not Detected  Not Detected Final   • Bordetella pertussis pcr 08/03/2020 Not Detected  Not Detected Final   • Bordetella parapertussis PCR 08/03/2020 Not Detected  Not Detected Final   • Chlamydophila pneumoniae PCR 08/03/2020 Not Detected  Not Detected Final   • Mycoplasma pneumo by PCR 08/03/2020 Not Detected  Not Detected Final   • Lactate 08/03/2020 2.0  0.5 - 2.0 mmol/L Final    Falsely depressed results may occur on samples drawn from patients receiving N-Acetylcysteine (NAC) or Metamizole.   • Glucose 08/03/2020 306* 70 - 130 mg/dL Final   • Glucose 08/04/2020 257* 65 - 99 mg/dL Final   • BUN 08/04/2020 14  6 - 20 mg/dL Final   • Creatinine 08/04/2020 0.91  0.76 - 1.27 mg/dL Final   • Sodium 08/04/2020 134* 136 - 145 mmol/L  Final   • Potassium 08/04/2020 4.1  3.5 - 5.2 mmol/L Final   • Chloride 08/04/2020 99  98 - 107 mmol/L Final   • CO2 08/04/2020 25.0  22.0 - 29.0 mmol/L Final   • Calcium 08/04/2020 8.9  8.6 - 10.5 mg/dL Final   • Total Protein 08/04/2020 6.3  6.0 - 8.5 g/dL Final   • Albumin 08/04/2020 3.70  3.50 - 5.20 g/dL Final   • ALT (SGPT) 08/04/2020 56* 1 - 41 U/L Final   • AST (SGOT) 08/04/2020 55* 1 - 40 U/L Final   • Alkaline Phosphatase 08/04/2020 91  39 - 117 U/L Final   • Total Bilirubin 08/04/2020 0.9  0.0 - 1.2 mg/dL Final   • eGFR Non African Amer 08/04/2020 85  >60 mL/min/1.73 Final   • Globulin 08/04/2020 2.6  gm/dL Final   • A/G Ratio 08/04/2020 1.4  g/dL Final   • BUN/Creatinine Ratio 08/04/2020 15.4  7.0 - 25.0 Final   • Anion Gap 08/04/2020 10.0  5.0 - 15.0 mmol/L Final   • Magnesium 08/04/2020 1.8  1.6 - 2.6 mg/dL Final   • Hemoglobin A1C 08/04/2020 9.70* 4.80 - 5.60 % Final   • Neutrophil % 08/04/2020 68.0  42.7 - 76.0 % Final   • Lymphocyte % 08/04/2020 19.0* 19.6 - 45.3 % Final   • Monocyte % 08/04/2020 4.0* 5.0 - 12.0 % Final   • Eosinophil % 08/04/2020 4.0  0.3 - 6.2 % Final   • Basophil % 08/04/2020 1.0  0.0 - 1.5 % Final   • Bands %  08/04/2020 1.0  0.0 - 5.0 % Final   • Metamyelocyte % 08/04/2020 1.0* 0.0 - 0.0 % Final   • Myelocyte % 08/04/2020 1.0* 0.0 - 0.0 % Final   • Atypical Lymphocyte % 08/04/2020 1.0  0.0 - 5.0 % Final   • Neutrophils Absolute 08/04/2020 5.31  1.70 - 7.00 10*3/mm3 Final   • Lymphocytes Absolute 08/04/2020 1.46  0.70 - 3.10 10*3/mm3 Final   • Monocytes Absolute 08/04/2020 0.31  0.10 - 0.90 10*3/mm3 Final   • Eosinophils Absolute 08/04/2020 0.31  0.00 - 0.40 10*3/mm3 Final   • Basophils Absolute 08/04/2020 0.08  0.00 - 0.20 10*3/mm3 Final   • RBC Morphology 08/04/2020 Normal  Normal Final   • WBC Morphology 08/04/2020 Normal  Normal Final   • Platelet Morphology 08/04/2020 Normal  Normal Final   • WBC 08/04/2020 7.70  3.40 - 10.80 10*3/mm3 Final   • RBC 08/04/2020 4.68  4.14 -  5.80 10*6/mm3 Final   • Hemoglobin 08/04/2020 12.2* 13.0 - 17.7 g/dL Final   • Hematocrit 08/04/2020 37.5  37.5 - 51.0 % Final   • MCV 08/04/2020 80.1  79.0 - 97.0 fL Final   • MCH 08/04/2020 26.1* 26.6 - 33.0 pg Final   • MCHC 08/04/2020 32.5  31.5 - 35.7 g/dL Final   • RDW 08/04/2020 14.9  12.3 - 15.4 % Final   • RDW-SD 08/04/2020 43.4  37.0 - 54.0 fl Final   • MPV 08/04/2020 10.2  6.0 - 12.0 fL Final   • Platelets 08/04/2020 205  140 - 450 10*3/mm3 Final   • Glucose 08/04/2020 252* 70 - 130 mg/dL Final   • Glucose 08/04/2020 401* 70 - 130 mg/dL Final   • Glucose 08/04/2020 385* 70 - 130 mg/dL Final   • Glucose 08/04/2020 363* 70 - 130 mg/dL Final   • Glucose 08/04/2020 342* 70 - 130 mg/dL Final   • Glucose 08/05/2020 375* 65 - 99 mg/dL Final   • BUN 08/05/2020 13  6 - 20 mg/dL Final   • Creatinine 08/05/2020 0.91  0.76 - 1.27 mg/dL Final   • Sodium 08/05/2020 135* 136 - 145 mmol/L Final   • Potassium 08/05/2020 4.7  3.5 - 5.2 mmol/L Final   • Chloride 08/05/2020 99  98 - 107 mmol/L Final   • CO2 08/05/2020 25.0  22.0 - 29.0 mmol/L Final   • Calcium 08/05/2020 9.2  8.6 - 10.5 mg/dL Final   • eGFR Non African Amer 08/05/2020 85  >60 mL/min/1.73 Final   • BUN/Creatinine Ratio 08/05/2020 14.3  7.0 - 25.0 Final   • Anion Gap 08/05/2020 11.0  5.0 - 15.0 mmol/L Final   • WBC 08/05/2020 4.66  3.40 - 10.80 10*3/mm3 Final   • RBC 08/05/2020 4.55  4.14 - 5.80 10*6/mm3 Final   • Hemoglobin 08/05/2020 11.8* 13.0 - 17.7 g/dL Final   • Hematocrit 08/05/2020 36.8* 37.5 - 51.0 % Final   • MCV 08/05/2020 80.9  79.0 - 97.0 fL Final   • MCH 08/05/2020 25.9* 26.6 - 33.0 pg Final   • MCHC 08/05/2020 32.1  31.5 - 35.7 g/dL Final   • RDW 08/05/2020 14.9  12.3 - 15.4 % Final   • RDW-SD 08/05/2020 43.5  37.0 - 54.0 fl Final   • MPV 08/05/2020 10.5  6.0 - 12.0 fL Final   • Platelets 08/05/2020 182  140 - 450 10*3/mm3 Final   • Neutrophil % 08/05/2020 62.4  42.7 - 76.0 % Final   • Lymphocyte % 08/05/2020 25.1  19.6 - 45.3 % Final   •  Monocyte % 08/05/2020 5.4  5.0 - 12.0 % Final   • Eosinophil % 08/05/2020 4.3  0.3 - 6.2 % Final   • Basophil % 08/05/2020 1.1  0.0 - 1.5 % Final   • Immature Grans % 08/05/2020 1.7* 0.0 - 0.5 % Final   • Neutrophils, Absolute 08/05/2020 2.91  1.70 - 7.00 10*3/mm3 Final   • Lymphocytes, Absolute 08/05/2020 1.17  0.70 - 3.10 10*3/mm3 Final   • Monocytes, Absolute 08/05/2020 0.25  0.10 - 0.90 10*3/mm3 Final   • Eosinophils, Absolute 08/05/2020 0.20  0.00 - 0.40 10*3/mm3 Final   • Basophils, Absolute 08/05/2020 0.05  0.00 - 0.20 10*3/mm3 Final   • Immature Grans, Absolute 08/05/2020 0.08* 0.00 - 0.05 10*3/mm3 Final   • nRBC 08/05/2020 0.0  0.0 - 0.2 /100 WBC Final   • Glucose 08/05/2020 334* 70 - 130 mg/dL Final   • Glucose 08/05/2020 392* 70 - 130 mg/dL Final   • Glucose 08/05/2020 355* 70 - 130 mg/dL Final   • Glucose 08/05/2020 349* 70 - 130 mg/dL Final   • Glucose 08/06/2020 336* 70 - 130 mg/dL Final   Admission on 07/08/2020, Discharged on 07/08/2020   Component Date Value Ref Range Status   • Extra Tube 07/08/2020 hold for add-on   Final    Auto resulted   • Extra Tube 07/08/2020 Hold for add-ons.   Final    Auto resulted.   • Extra Tube 07/08/2020 hold for add-on   Final    Auto resulted   • Extra Tube 07/08/2020 Hold for add-ons.   Final    Auto resulted.   • Glucose 07/08/2020 215* 65 - 99 mg/dL Final    Glucose >180, Hemoglobin A1C recommended.   • BUN 07/08/2020 16  6 - 20 mg/dL Final   • Creatinine 07/08/2020 1.47* 0.76 - 1.27 mg/dL Final   • Sodium 07/08/2020 135* 136 - 145 mmol/L Final   • Potassium 07/08/2020 4.9  3.5 - 5.2 mmol/L Final   • Chloride 07/08/2020 101  98 - 107 mmol/L Final   • CO2 07/08/2020 23.2  22.0 - 29.0 mmol/L Final   • Calcium 07/08/2020 9.5  8.6 - 10.5 mg/dL Final   • Total Protein 07/08/2020 6.3  6.0 - 8.5 g/dL Final   • Albumin 07/08/2020 3.70  3.50 - 5.20 g/dL Final   • ALT (SGPT) 07/08/2020 71* 1 - 41 U/L Final   • AST (SGOT) 07/08/2020 61* 1 - 40 U/L Final   • Alkaline  Phosphatase 07/08/2020 89  39 - 117 U/L Final   • Total Bilirubin 07/08/2020 0.8  0.0 - 1.2 mg/dL Final   • eGFR Non African Amer 07/08/2020 49* >60 mL/min/1.73 Final   • Globulin 07/08/2020 2.6  gm/dL Final   • A/G Ratio 07/08/2020 1.4  g/dL Final   • BUN/Creatinine Ratio 07/08/2020 10.9  7.0 - 25.0 Final   • Anion Gap 07/08/2020 10.8  5.0 - 15.0 mmol/L Final   • Troponin T 07/08/2020 0.028  0.000 - 0.030 ng/mL Final   • WBC 07/08/2020 6.23  3.40 - 10.80 10*3/mm3 Final   • RBC 07/08/2020 4.40  4.14 - 5.80 10*6/mm3 Final   • Hemoglobin 07/08/2020 11.8* 13.0 - 17.7 g/dL Final   • Hematocrit 07/08/2020 35.6* 37.5 - 51.0 % Final   • MCV 07/08/2020 80.9  79.0 - 97.0 fL Final   • MCH 07/08/2020 26.8  26.6 - 33.0 pg Final   • MCHC 07/08/2020 33.1  31.5 - 35.7 g/dL Final   • RDW 07/08/2020 15.1  12.3 - 15.4 % Final   • RDW-SD 07/08/2020 44.7  37.0 - 54.0 fl Final   • MPV 07/08/2020 9.9  6.0 - 12.0 fL Final   • Platelets 07/08/2020 179  140 - 450 10*3/mm3 Final   • Neutrophil % 07/08/2020 71.0  42.7 - 76.0 % Final   • Lymphocyte % 07/08/2020 14.8* 19.6 - 45.3 % Final   • Monocyte % 07/08/2020 8.5  5.0 - 12.0 % Final   • Eosinophil % 07/08/2020 2.4  0.3 - 6.2 % Final   • Basophil % 07/08/2020 1.1  0.0 - 1.5 % Final   • Immature Grans % 07/08/2020 2.2* 0.0 - 0.5 % Final   • Neutrophils, Absolute 07/08/2020 4.42  1.70 - 7.00 10*3/mm3 Final   • Lymphocytes, Absolute 07/08/2020 0.92  0.70 - 3.10 10*3/mm3 Final   • Monocytes, Absolute 07/08/2020 0.53  0.10 - 0.90 10*3/mm3 Final   • Eosinophils, Absolute 07/08/2020 0.15  0.00 - 0.40 10*3/mm3 Final   • Basophils, Absolute 07/08/2020 0.07  0.00 - 0.20 10*3/mm3 Final   • Immature Grans, Absolute 07/08/2020 0.14* 0.00 - 0.05 10*3/mm3 Final   • nRBC 07/08/2020 0.0  0.0 - 0.2 /100 WBC Final   • Color,  07/08/2020 Dark Yellow* Yellow, Straw Final   • Appearance,  07/08/2020 Clear  Clear Final   • pH,  07/08/2020 <=5.0  5.0 - 8.0 Final   • Specific Gravity,  07/08/2020 1.019   1.005 - 1.030 Final   • Glucose, UA 07/08/2020 500 mg/dL (2+)* Negative Final   • Ketones, UA 07/08/2020 Negative  Negative Final   • Bilirubin, UA 07/08/2020 Negative  Negative Final   • Blood, UA 07/08/2020 Negative  Negative Final   • Protein, UA 07/08/2020 Negative  Negative Final   • Leuk Esterase, UA 07/08/2020 Trace* Negative Final   • Nitrite, UA 07/08/2020 Negative  Negative Final   • Urobilinogen, UA 07/08/2020 1.0 E.U./dL  0.2 - 1.0 E.U./dL Final   • RBC, UA 07/08/2020 0-2* None Seen /HPF Final   • WBC, UA 07/08/2020 3-5* None Seen /HPF Final   • Bacteria, UA 07/08/2020 Trace* None Seen /HPF Final   • Squamous Epithelial Cells, UA 07/08/2020 3-6* None Seen, 0-2 /HPF Final   • Hyaline Casts, UA 07/08/2020 0-2  None Seen /LPF Final   • Methodology 07/08/2020 Manual Light Microscopy   Final   • Lactate 07/08/2020 1.9  0.5 - 2.0 mmol/L Final   • COVID19 07/08/2020 Not Detected  Not Detected - Ref. Range Final   Admission on 07/02/2020, Discharged on 07/02/2020   Component Date Value Ref Range Status   • Glucose 07/02/2020 148* 70 - 130 mg/dL Final    Serial Number: [id number]:  [id number]   • Case Report 07/02/2020    Final                    Value:Surgical Pathology Report                         Case: [id number]                                  Authorizing Provider:  Petra Crowell MD  Collected:           07/02/2020 01:17 PM          Ordering Location:     [facility]    Received:            07/06/2020 09:38 AM                                 [facility]                                                                    Pathologist:           Adalberto Panda MD                                                            Specimens:   1) - Large Intestine, Left / Descending Colon, descending colon polyp                               2) - Large Intestine, Sigmoid Colon, sigmoid colon polyp                                            3) - Large Intestine, Rectum, rectal polyp                                                 • Final Diagnosis 07/02/2020    Final                    Value:This result contains rich text formatting which cannot be displayed here.   Admission on 06/23/2020, Discharged on 06/23/2020   Component Date Value Ref Range Status   • Extra Tube 06/23/2020 hold for add-on   Final    Auto resulted   • Extra Tube 06/23/2020 Hold for add-ons.   Final    Auto resulted.   • Extra Tube 06/23/2020 hold for add-on   Final    Auto resulted   • Extra Tube 06/23/2020 Hold for add-ons.   Final    Auto resulted.   • Glucose 06/23/2020 292* 65 - 99 mg/dL Final    Glucose >180, Hemoglobin A1C recommended.   • BUN 06/23/2020 22* 6 - 20 mg/dL Final   • Creatinine 06/23/2020 1.43* 0.76 - 1.27 mg/dL Final   • Sodium 06/23/2020 135* 136 - 145 mmol/L Final   • Potassium 06/23/2020 4.5  3.5 - 5.2 mmol/L Final   • Chloride 06/23/2020 94* 98 - 107 mmol/L Final   • CO2 06/23/2020 29.7* 22.0 - 29.0 mmol/L Final   • Calcium 06/23/2020 9.4  8.6 - 10.5 mg/dL Final   • Total Protein 06/23/2020 6.5  6.0 - 8.5 g/dL Final   • Albumin 06/23/2020 3.80  3.50 - 5.20 g/dL Final   • ALT (SGPT) 06/23/2020 74* 1 - 41 U/L Final   • AST (SGOT) 06/23/2020 84* 1 - 40 U/L Final   • Alkaline Phosphatase 06/23/2020 82  39 - 117 U/L Final   • Total Bilirubin 06/23/2020 0.8  0.2 - 1.2 mg/dL Final   • eGFR Non African Amer 06/23/2020 51* >60 mL/min/1.73 Final   • Globulin 06/23/2020 2.7  gm/dL Final   • A/G Ratio 06/23/2020 1.4  g/dL Final   • BUN/Creatinine Ratio 06/23/2020 15.4  7.0 - 25.0 Final   • Anion Gap 06/23/2020 11.3  5.0 - 15.0 mmol/L Final   • Color, UA 06/23/2020 Yellow  Yellow, Straw Final   • Appearance, UA 06/23/2020 Clear  Clear Final   • pH, UA 06/23/2020 <=5.0  5.0 - 8.0 Final   • Specific Gravity, UA 06/23/2020 1.014  1.005 - 1.030 Final   • Glucose, UA 06/23/2020 Negative  Negative Final   • Ketones, UA 06/23/2020 Negative  Negative Final   • Bilirubin, UA 06/23/2020 Negative  Negative Final   • Blood, UA 06/23/2020  Negative  Negative Final   • Protein, UA 06/23/2020 Negative  Negative Final   • Leuk Esterase, UA 06/23/2020 Negative  Negative Final   • Nitrite, UA 06/23/2020 Negative  Negative Final   • Urobilinogen, UA 06/23/2020 1.0 E.U./dL  0.2 - 1.0 E.U./dL Final   • proBNP 06/23/2020 56.4  5.0 - 900.0 pg/mL Final   • Troponin T 06/23/2020 0.019  0.000 - 0.030 ng/mL Final   • WBC 06/23/2020 7.14  3.40 - 10.80 10*3/mm3 Final   • RBC 06/23/2020 4.57  4.14 - 5.80 10*6/mm3 Final   • Hemoglobin 06/23/2020 12.4* 13.0 - 17.7 g/dL Final   • Hematocrit 06/23/2020 37.2* 37.5 - 51.0 % Final   • MCV 06/23/2020 81.4  79.0 - 97.0 fL Final   • MCH 06/23/2020 27.1  26.6 - 33.0 pg Final   • MCHC 06/23/2020 33.3  31.5 - 35.7 g/dL Final   • RDW 06/23/2020 15.4  12.3 - 15.4 % Final   • RDW-SD 06/23/2020 45.5  37.0 - 54.0 fl Final   • MPV 06/23/2020 10.4  6.0 - 12.0 fL Final   • Platelets 06/23/2020 175  140 - 450 10*3/mm3 Final   • Neutrophil % 06/23/2020 68.3  42.7 - 76.0 % Final   • Lymphocyte % 06/23/2020 21.6  19.6 - 45.3 % Final   • Monocyte % 06/23/2020 5.3  5.0 - 12.0 % Final   • Eosinophil % 06/23/2020 3.5  0.3 - 6.2 % Final   • Basophil % 06/23/2020 0.7  0.0 - 1.5 % Final   • Immature Grans % 06/23/2020 0.6* 0.0 - 0.5 % Final   • Neutrophils, Absolute 06/23/2020 4.88  1.70 - 7.00 10*3/mm3 Final   • Lymphocytes, Absolute 06/23/2020 1.54  0.70 - 3.10 10*3/mm3 Final   • Monocytes, Absolute 06/23/2020 0.38  0.10 - 0.90 10*3/mm3 Final   • Eosinophils, Absolute 06/23/2020 0.25  0.00 - 0.40 10*3/mm3 Final   • Basophils, Absolute 06/23/2020 0.05  0.00 - 0.20 10*3/mm3 Final   • Immature Grans, Absolute 06/23/2020 0.04  0.00 - 0.05 10*3/mm3 Final   • nRBC 06/23/2020 0.0  0.0 - 0.2 /100 WBC Final   Lab on 06/17/2020   Component Date Value Ref Range Status   • CORDELIA Direct 06/17/2020 Negative  Negative Final   • Smooth Muscle Ab 06/17/2020 3  0 - 19 Units Final                     Negative                     0 - 19                   Weak positive                20 - 30                   Moderate to strong positive     >30   Actin Antibodies are found in 52-85% of patients with   autoimmune hepatitis or chronic active hepatitis and   in 22% of patients with primary biliary cirrhosis.   • Ceruloplasmin 06/17/2020 25  16 - 31 mg/dL Final   • Ferritin 06/17/2020 109.00  30.00 - 400.00 ng/mL Final   • Iron 06/17/2020 61  59 - 158 mcg/dL Final   • Iron Saturation 06/17/2020 15* 20 - 50 % Final   • Transferrin 06/17/2020 269  200 - 360 mg/dL Final   • TIBC 06/17/2020 401  298 - 536 mcg/dL Final   • Hep A Total Ab 06/17/2020 Negative  Negative Final   • Hep B S Ab 06/17/2020 Non-Reactive  Non-Reactive Final   • Fibrosis Score (References) 06/17/2020 0.62* 0.00 - 0.21 Final   • Fibrosis Stage (Reference) 06/17/2020 Comment   Final     F3-Bridging fibrosis with many septa   • Steatosis Score (Reference) 06/17/2020 0.91* 0.00 - 0.30 Final   • Steatosis Grade (Reference) 06/17/2020 Comment   Final                S3 - Marked or Severe Steatosis   • MUSE Score (Reference) 06/17/2020 0.50* 0.25 Final   • Muse Grade (Reference) 06/17/2020 Comment   Final                N1 - Borderline or probable MUSE   • Height: (Reference) 06/17/2020 70  in Final   • Weight: (Reference) 06/17/2020 307  LBS Final   • Alpha 2-Macroglobulins, Qn 06/17/2020 261  110 - 276 mg/dL Final   • Haptoglobin 06/17/2020 191  29 - 370 mg/dL Final   • Apolipoprotein A-1 06/17/2020 91* 101 - 178 mg/dL Final   • Total Bilirubin 06/17/2020 0.4  0.0 - 1.2 mg/dL Final   • GGT 06/17/2020 147* 0 - 65 IU/L Final   • ALT (SGPT) 06/17/2020 104* 0 - 55 IU/L Final   • AST (SGOT) P5P (Reference) 06/17/2020 115* 0 - 40 IU/L Final   • Cholesterol, Total (Reference) 06/17/2020 104  100 - 199 mg/dL Final   • Glucose, Serum (Reference) 06/17/2020 215* 65 - 99 mg/dL Final   • Triglycerides 06/17/2020 171* 0 - 149 mg/dL Final   • Interpretations: (Reference) 06/17/2020 Comment   Final    Comment: Quantitative results of 10  biochemicals in combination with  age, gender, height, and weight, are analyzed using a  computational algorithm to provide a quantitative surrogate  marker (0.0-1.0) of liver fibrosis (Metavir F0-F4), hepatic  steatosis (0.0-1.0, S0-S3), and Non-Alcoholic Steato-  Hepatitis (MUSE) (0.0-0.75, N0-N2). The absence of steatosis  (S<0.38) precludes the diagnosis of MUSE.  Fibrosis marker:  In a study of 171 Non-Alcoholic Fatty  Liver Disease (NAFLD) patients where 23% had significant  NAFLD fibrosis (Metavir F2-F4) and 11% had cirrhosis by  liver biopsy, a fibrosis result of >0.3 yielded a  sensitivity of 83% and a specificity of 78% for the  detection of significant fibrosis(1).  Steatosis Marker:  In a population of 744 patients (583 HCV,  18 HBV, 69 NAFLD, and 74 alcoholic disease patients), where  36% had significant steatosis (>5%) on a liver biopsy, a  steatosis score >0.5 had a sensitivity of 71% and a  specificity of 72% for identification of                            significant  steatosis(2).  MUSE marker:  In a population of 257 NAFLD patients, where  62% had at least some MUSE by liver biopsy, a prediction of  MUSE had a sensitivity of 88% for identifying MUSE and a  specificity of 50%(3).   • Fibrosis Scorin2020 Comment   Final          <0.21 = Stage F0 - No fibrosis  0.21 - 0.27 = Stage F0 - F1  0.27 - 0.31 = Stage F1 - Portal fibrosis  0.31 - 0.48 = Stage F1 - F2  0.48 - 0.58 = Stage F2 - Bridging fibrosis with few septa  0.58 - 0.72 = Stage F3 - Bridging fibrosis with many septa  0.72 - 0.74 = Stage F3 - F4        >0.74 = Stage F4 - Cirrhosis   • Steatosis Grading (Reference) 2020 Comment   Final          < 0.30 = S0 - No Steatosis  0.30 to 0.38 = S0 - S1  0.38 to 0.48 = S1 - Minimal Steatosis  0.48 to 0.57 = S1 - S2  0.57 to 0.67 = S2 - Moderate Steatosis  0.67 to 0.69 = S2 - S3        > 0.69 = S3 - Marked or Severe Steatosis   • Muse Scoring (Reference) 2020 Comment   Final     0.25 = N0 - Not MUSE  0.50 = N1 - Borderline or probable MUSE  0.75 = N2 - MUSE   • Limitations: (Reference) 06/17/2020 Comment   Final    MUSE FibroSure is recommended for patients with suspected non-  alcoholic fatty liver disease.  It is not recommended for patients  with other liver diseases.  It is also not recommended in patients  with Gilbert Disease, acute hemolysis, acute viral hepatitis, drug  induced hepatitis, genetic liver disease, autoimmune hepatitis and/or  extra-hepatic cholestasis.  Any of these clinical situations may lead  to inaccurate quantitative predictions of fibrosis.   • Comment (Reference) 06/17/2020 Comment   Final    This test was developed and its performance characteristics determined  by [employer].  It has not been cleared or approved by the Food and Drug  Administration.  The FDA has determined that such clearance or  approval is not necessary.  For questions regarding this report please contact  customer service at [phone].  References:  1. Vahid GÓMEZ et al. Diagnostic Value of Biochemical Markers     (FibroTest) for the prediction of Liver Fibrosis in     patients with Non-Alcoholic Fatty Liver Disease. [facility]     Gastroenterology 2006; 6:6.  2. CARLEY Freeman. et al. The Diagnostic Value of Biomarkers     (Steato Test) for the Prediction of Liver Steatosis.     Comparative Hepatol. 2005; 4:10.  3. CARLEY Freeman, Renee Redding, et al. Diagnostic value     of biochemical markers (MUSE TEST) for the prediction of     non alcohol steato hepatitis in patients with non-     alcoholic fatty liver disease. BMC Gastroenterology 2006;     6:34 doi:10.1186/6171-741Q-0-34.   • Reference Lab Report 06/29/2020 See Scanned Report   Final   • COVID19 06/29/2020 Not Detected  Not Detected - Ref. Range Final   • Protime 06/17/2020 13.4  12.0 - 15.1 Seconds Final   • INR 06/17/2020 0.98  0.90 - 1.10 Final      Ct Angiogram Head    Result Date: 8/3/2020  Minimal cervical carotid and intracranial  atherosclerotic disease without significant associated narrowing. No large vessel occlusion, high-grade stenosis or aneurysmal dilatation.  D:  08/03/2020 E:  08/03/2020  This report was finalized on 8/3/2020 5:26 PM by Amaury Fuentes.      Xr Chest 2 View    Result Date: 6/24/2020  Similar chronic appearing findings.  This report was finalized on 6/24/2020 1:09 PM by Jonathan Johnson MD.    Ct Head Without Contrast    Result Date: 8/3/2020  No acute intracranial abnormality.   This report was finalized on 8/3/2020 2:56 PM by Amaury Fuentes.      Ct Head Without Contrast    Result Date: 6/23/2020  No acute intracranial abnormality. Authenticated by Isaiah Cardoza M.D. on 06/23/2020 08:12:23 PM    Ct Angiogram Neck    Result Date: 8/3/2020  Minimal cervical carotid and intracranial atherosclerotic disease without significant associated narrowing. No large vessel occlusion, high-grade stenosis or aneurysmal dilatation.  D:  08/03/2020 E:  08/03/2020  This report was finalized on 8/3/2020 5:26 PM by Amaury Fuentes.      Ct Chest Without Contrast    Result Date: 8/3/2020  No acute process in the chest to account for dyspnea.  Coronary and aortic atherosclerosis as above.  Several small calcified thyroid nodules. These can be followed with nonemergent ultrasound as indicated if not already performed.   This report was finalized on 8/3/2020 5:14 PM by Amaury Fuentes.      Mri Brain Without Contrast    Result Date: 8/4/2020  No evidence of acute ischemia, hemorrhage, mass or mass effect.  Left frontal sinus mucosal disease can be correlated with any clinical signs sinusitis.  This report was finalized on 8/4/2020 8:02 AM by Amaury Fuentes.      Xr Chest 1 View    Result Date: 8/3/2020  Stable exam with low lung volumes, cardiomegaly, trace left pleural effusion and mild pulmonary vascular congestion without overt edema.  D:  08/03/2020 E:  08/03/2020  This report was finalized on 8/3/2020 5:24 PM by Amaury Fuentes.       Xr Chest 1 View    Result Date: 7/8/2020  No acute cardiopulmonary process.  Continued followup is recommended.  This report was finalized on 7/8/2020 2:34 PM by Aspen Decker M.D..    Ct Cerebral Perfusion With & Without Contrast    Result Date: 8/4/2020  Perfusion maps demonstrate symmetric blood volume, relative flow and mean transit time without evidence of core infarct or tissue at risk.  D:  08/03/2020 E:  08/04/2020  This report was finalized on 8/4/2020 4:42 PM by Amaury Fuentes.        Assessment / Plan        1.  Adenomatous colon polyp  He had a colonoscopy done on 7/2/2020 .  Prep was suboptimal with a solid stool in the colon.  He had 3 polyps removed all less than 1 cm descending, sigmoid, rectum.  2 of the the polyps came out as a tubular adenoma.  I have discussed the pathology report today.    Given his poor prep will repeat the colonoscopy in 3 years time in 2023    2.  Nonalcoholic steatohepatitis pre-cirrhotic  MUSE fibro-sure; F3 fibrosis,N3 steatosis,, N1 steatohepatitis  Iron studies reveal a normal iron and iron saturation ruling out hemochromatosis.  Hep C antibody negative.  He is not immune to hep A and hep B.  Ceruloplasmin level normal.  Anti-smooth muscle antibody antimitochondrial antibody antinuclear antibody were negative.  No pulmonary issues.   Needs a hep A hep B vaccine at PCPs office or health department  Low-salt diet discussed.  Also discussed the Mediterranean diet.   He is intolerant to metformin, he cannot take Actos due to his cardiac issues.  We can consider vitamin  E optional if bariatric surgery is declined  We will get an ultrasound liver  He needs a repeat EGD to rule out esophageal varices, will schedule EGD next visit after ultrasound abdomen    3. Morbidly obese (CMS/HCC)  Patient states that he cannot exercise.  He is also states that he tried dietary change which did not help.  His BMI is 44.  Most of his symptoms stem from his obesity  We will get  bariatric surgery referral for possible gastric bypass or gastric sleeve surgery.  This would probably prevent the progression of his liver disease and progression to cirrhosis    Prior history  4. Gastroesophageal reflux disease without esophagitis  Chronic history of reflux disease.  He is non-smoker  Heartburn symptoms well controlled with the low-dose PPI.  Last EGD 5 years ago done at [facility] showed only minimal gastric inflammation per patient  Reflux diet discussed and advised to continue the PPI for now            Follow Up:   No follow-ups on file.    Petra Crowell MD  [facility] [place]  8/12/2020  10:57     Please note that portions of this note may have been completed with a voice recognition program.

## 2020-08-12 ENCOUNTER — OFFICE VISIT (OUTPATIENT)
Dept: GASTROENTEROLOGY | Facility: CLINIC | Age: 60
End: 2020-08-12

## 2020-08-12 VITALS
BODY MASS INDEX: 42.37 KG/M2 | OXYGEN SATURATION: 98 % | HEART RATE: 78 BPM | WEIGHT: 296 LBS | TEMPERATURE: 97.3 F | SYSTOLIC BLOOD PRESSURE: 118 MMHG | RESPIRATION RATE: 18 BRPM | DIASTOLIC BLOOD PRESSURE: 78 MMHG | HEIGHT: 70 IN

## 2020-08-12 DIAGNOSIS — E66.01 MORBIDLY OBESE (HCC): ICD-10-CM

## 2020-08-12 DIAGNOSIS — K76.0 FATTY LIVER: ICD-10-CM

## 2020-08-12 DIAGNOSIS — D12.6 ADENOMATOUS POLYP OF COLON, UNSPECIFIED PART OF COLON: Primary | ICD-10-CM

## 2020-08-12 DIAGNOSIS — R79.89 ELEVATED LFTS: ICD-10-CM

## 2020-08-12 PROCEDURE — 99214 OFFICE O/P EST MOD 30 MIN: CPT | Performed by: INTERNAL MEDICINE

## 2020-08-13 ENCOUNTER — READMISSION MANAGEMENT (OUTPATIENT)
Dept: CALL CENTER | Facility: HOSPITAL | Age: 60
End: 2020-08-13

## 2020-08-13 NOTE — OUTREACH NOTE
Medical Week 2 Survey      Responses   [facility] patient discharged from?  [place]   COVID-19 Test Status  Negative   Does the patient have one of the following disease processes/diagnoses(primary or secondary)?  Other   Week 2 attempt successful?  Yes   Call start time  1246   Discharge diagnosis  AMS, pre-syncope, Hypotension   Call end time  1249   Meds reviewed with patient/caregiver?  Yes   Is the patient having any side effects they believe may be caused by any medication additions or changes?  No   Does the patient have all medications ordered at discharge?  Yes   Is the patient taking all medications as directed (includes completed medication regime)?  Yes   Does the patient have an appointment with their PCP within 7 days of discharge?  Yes   Has the patient kept scheduled appointments due by today?  Yes   Psychosocial issues?  No   Did the patient receive a copy of their discharge instructions?  Yes   Nursing interventions  Reviewed instructions with patient   What is the patient's perception of their health status since discharge?  Same   Is the patient/caregiver able to teach back signs and symptoms related to disease process for when to call PCP?  Yes   Is the patient/caregiver able to teach back signs and symptoms related to disease process for when to call 911?  Yes   Is the patient/caregiver able to teach back the hierarchy of who to call/visit for symptoms/problems? PCP, Specialist, Home health nurse, Urgent Care, ED, 911  Yes   Week 2 Call Completed?  Yes   Wrap up additional comments  had to cancel appt today just founf out his mom has stage 4 stomach cancer.          Edna Clarke RN

## 2020-08-16 ENCOUNTER — HOSPITAL ENCOUNTER (OUTPATIENT)
Facility: HOSPITAL | Age: 60
Setting detail: OBSERVATION
Discharge: HOME | End: 2020-08-19
Attending: EMERGENCY MEDICINE | Admitting: FAMILY MEDICINE

## 2020-08-16 DIAGNOSIS — N17.9 ACUTE RENAL FAILURE, UNSPECIFIED ACUTE RENAL FAILURE TYPE (HCC): ICD-10-CM

## 2020-08-16 DIAGNOSIS — R42 LIGHTHEADEDNESS: Primary | ICD-10-CM

## 2020-08-16 DIAGNOSIS — E87.20 LACTIC ACIDOSIS: ICD-10-CM

## 2020-08-16 DIAGNOSIS — Z74.09 IMPAIRED MOBILITY AND ADLS: ICD-10-CM

## 2020-08-16 DIAGNOSIS — I95.9 HYPOTENSION, UNSPECIFIED HYPOTENSION TYPE: ICD-10-CM

## 2020-08-16 DIAGNOSIS — Z78.9 IMPAIRED MOBILITY AND ADLS: ICD-10-CM

## 2020-08-16 PROCEDURE — 99284 EMERGENCY DEPT VISIT MOD MDM: CPT

## 2020-08-16 RX ORDER — SODIUM CHLORIDE 0.9 % (FLUSH) 0.9 %
10 SYRINGE (ML) INJECTION AS NEEDED
Status: DISCONTINUED | OUTPATIENT
Start: 2020-08-16 | End: 2020-08-19 | Stop reason: HOSPADM

## 2020-08-17 ENCOUNTER — APPOINTMENT (OUTPATIENT)
Dept: CARDIOLOGY | Facility: HOSPITAL | Age: 60
End: 2020-08-17

## 2020-08-17 ENCOUNTER — APPOINTMENT (OUTPATIENT)
Dept: GENERAL RADIOLOGY | Facility: HOSPITAL | Age: 60
End: 2020-08-17

## 2020-08-17 ENCOUNTER — READMISSION MANAGEMENT (OUTPATIENT)
Dept: CALL CENTER | Facility: HOSPITAL | Age: 60
End: 2020-08-17

## 2020-08-17 PROBLEM — I95.1 ORTHOSTATIC HYPOTENSION: Status: ACTIVE | Noted: 2020-08-17

## 2020-08-17 LAB
ALBUMIN SERPL-MCNC: 3.9 G/DL (ref 3.5–5.2)
ALBUMIN/GLOB SERPL: 1.4 G/DL
ALP SERPL-CCNC: 92 U/L (ref 39–117)
ALT SERPL W P-5'-P-CCNC: 86 U/L (ref 1–41)
ANION GAP SERPL CALCULATED.3IONS-SCNC: 10.1 MMOL/L (ref 5–15)
ANION GAP SERPL CALCULATED.3IONS-SCNC: 10.8 MMOL/L (ref 5–15)
AST SERPL-CCNC: 65 U/L (ref 1–40)
BACTERIA UR QL AUTO: ABNORMAL /HPF
BASOPHILS # BLD AUTO: 0.07 10*3/MM3 (ref 0–0.2)
BASOPHILS NFR BLD AUTO: 0.7 % (ref 0–1.5)
BILIRUB SERPL-MCNC: 0.7 MG/DL (ref 0–1.2)
BILIRUB UR QL STRIP: NEGATIVE
BUN SERPL-MCNC: 17 MG/DL (ref 6–20)
BUN SERPL-MCNC: 17 MG/DL (ref 6–20)
BUN/CREAT SERPL: 13.1 (ref 7–25)
BUN/CREAT SERPL: 9.4 (ref 7–25)
CALCIUM SPEC-SCNC: 9.1 MG/DL (ref 8.6–10.5)
CALCIUM SPEC-SCNC: 9.5 MG/DL (ref 8.6–10.5)
CHLORIDE SERPL-SCNC: 96 MMOL/L (ref 98–107)
CHLORIDE SERPL-SCNC: 99 MMOL/L (ref 98–107)
CLARITY UR: CLEAR
CO2 SERPL-SCNC: 25.2 MMOL/L (ref 22–29)
CO2 SERPL-SCNC: 27.9 MMOL/L (ref 22–29)
COLOR UR: YELLOW
CREAT SERPL-MCNC: 1.3 MG/DL (ref 0.76–1.27)
CREAT SERPL-MCNC: 1.8 MG/DL (ref 0.76–1.27)
D-LACTATE SERPL-SCNC: 1.6 MMOL/L (ref 0.5–2)
D-LACTATE SERPL-SCNC: 2.8 MMOL/L (ref 0.5–2)
DEPRECATED RDW RBC AUTO: 41.9 FL (ref 37–54)
EOSINOPHIL # BLD AUTO: 0.18 10*3/MM3 (ref 0–0.4)
EOSINOPHIL NFR BLD AUTO: 1.8 % (ref 0.3–6.2)
ERYTHROCYTE [DISTWIDTH] IN BLOOD BY AUTOMATED COUNT: 14.6 % (ref 12.3–15.4)
GFR SERPL CREATININE-BSD FRML MDRD: 39 ML/MIN/1.73
GFR SERPL CREATININE-BSD FRML MDRD: 57 ML/MIN/1.73
GLOBULIN UR ELPH-MCNC: 2.7 GM/DL
GLUCOSE BLDC GLUCOMTR-MCNC: 217 MG/DL (ref 70–130)
GLUCOSE BLDC GLUCOMTR-MCNC: 238 MG/DL (ref 70–130)
GLUCOSE BLDC GLUCOMTR-MCNC: 241 MG/DL (ref 70–130)
GLUCOSE BLDC GLUCOMTR-MCNC: 300 MG/DL (ref 70–130)
GLUCOSE BLDC GLUCOMTR-MCNC: 357 MG/DL (ref 70–130)
GLUCOSE SERPL-MCNC: 213 MG/DL (ref 65–99)
GLUCOSE SERPL-MCNC: 248 MG/DL (ref 65–99)
GLUCOSE UR STRIP-MCNC: NEGATIVE MG/DL
HCT VFR BLD AUTO: 37.7 % (ref 37.5–51)
HGB BLD-MCNC: 12.9 G/DL (ref 13–17.7)
HGB UR QL STRIP.AUTO: NEGATIVE
HOLD SPECIMEN: NORMAL
HOLD SPECIMEN: NORMAL
HYALINE CASTS UR QL AUTO: ABNORMAL /LPF
IMM GRANULOCYTES # BLD AUTO: 0.08 10*3/MM3 (ref 0–0.05)
IMM GRANULOCYTES NFR BLD AUTO: 0.8 % (ref 0–0.5)
KETONES UR QL STRIP: NEGATIVE
LACTATE HOLD SPECIMEN: NORMAL
LEUKOCYTE ESTERASE UR QL STRIP.AUTO: ABNORMAL
LYMPHOCYTES # BLD AUTO: 1.55 10*3/MM3 (ref 0.7–3.1)
LYMPHOCYTES NFR BLD AUTO: 15.6 % (ref 19.6–45.3)
MAGNESIUM SERPL-MCNC: 1.8 MG/DL (ref 1.6–2.6)
MAXIMAL PREDICTED HEART RATE: 161 BPM
MCH RBC QN AUTO: 27 PG (ref 26.6–33)
MCHC RBC AUTO-ENTMCNC: 34.2 G/DL (ref 31.5–35.7)
MCV RBC AUTO: 79 FL (ref 79–97)
MONOCYTES # BLD AUTO: 0.4 10*3/MM3 (ref 0.1–0.9)
MONOCYTES NFR BLD AUTO: 4 % (ref 5–12)
NEUTROPHILS NFR BLD AUTO: 7.64 10*3/MM3 (ref 1.7–7)
NEUTROPHILS NFR BLD AUTO: 77.1 % (ref 42.7–76)
NITRITE UR QL STRIP: NEGATIVE
NRBC BLD AUTO-RTO: 0 /100 WBC (ref 0–0.2)
PH UR STRIP.AUTO: <=5 [PH] (ref 5–8)
PLATELET # BLD AUTO: 218 10*3/MM3 (ref 140–450)
PMV BLD AUTO: 10.7 FL (ref 6–12)
POTASSIUM SERPL-SCNC: 4.5 MMOL/L (ref 3.5–5.2)
POTASSIUM SERPL-SCNC: 4.7 MMOL/L (ref 3.5–5.2)
PROT SERPL-MCNC: 6.6 G/DL (ref 6–8.5)
PROT UR QL STRIP: NEGATIVE
RBC # BLD AUTO: 4.77 10*6/MM3 (ref 4.14–5.8)
RBC # UR: ABNORMAL /HPF
REF LAB TEST METHOD: ABNORMAL
SODIUM SERPL-SCNC: 134 MMOL/L (ref 136–145)
SODIUM SERPL-SCNC: 135 MMOL/L (ref 136–145)
SP GR UR STRIP: 1.01 (ref 1–1.03)
SQUAMOUS #/AREA URNS HPF: ABNORMAL /HPF
STRESS TARGET HR: 137 BPM
TROPONIN T SERPL-MCNC: 0.03 NG/ML (ref 0–0.03)
UROBILINOGEN UR QL STRIP: ABNORMAL
WBC # BLD AUTO: 9.92 10*3/MM3 (ref 3.4–10.8)
WBC UR QL AUTO: ABNORMAL /HPF
WHOLE BLOOD HOLD SPECIMEN: NORMAL
WHOLE BLOOD HOLD SPECIMEN: NORMAL

## 2020-08-17 PROCEDURE — 84484 ASSAY OF TROPONIN QUANT: CPT | Performed by: EMERGENCY MEDICINE

## 2020-08-17 PROCEDURE — G0378 HOSPITAL OBSERVATION PER HR: HCPCS

## 2020-08-17 PROCEDURE — 82962 GLUCOSE BLOOD TEST: CPT

## 2020-08-17 PROCEDURE — 97161 PT EVAL LOW COMPLEX 20 MIN: CPT

## 2020-08-17 PROCEDURE — 94640 AIRWAY INHALATION TREATMENT: CPT

## 2020-08-17 PROCEDURE — 83605 ASSAY OF LACTIC ACID: CPT | Performed by: EMERGENCY MEDICINE

## 2020-08-17 PROCEDURE — 63710000001 INSULIN DETEMIR PER 5 UNITS: Performed by: NURSE PRACTITIONER

## 2020-08-17 PROCEDURE — 80053 COMPREHEN METABOLIC PANEL: CPT | Performed by: EMERGENCY MEDICINE

## 2020-08-17 PROCEDURE — 94799 UNLISTED PULMONARY SVC/PX: CPT

## 2020-08-17 PROCEDURE — 97165 OT EVAL LOW COMPLEX 30 MIN: CPT

## 2020-08-17 PROCEDURE — 85025 COMPLETE CBC W/AUTO DIFF WBC: CPT | Performed by: EMERGENCY MEDICINE

## 2020-08-17 PROCEDURE — 93306 TTE W/DOPPLER COMPLETE: CPT | Performed by: INTERNAL MEDICINE

## 2020-08-17 PROCEDURE — 63710000001 INSULIN REGULAR HUMAN PER 5 UNITS: Performed by: INTERNAL MEDICINE

## 2020-08-17 PROCEDURE — 99219 PR INITIAL OBSERVATION CARE/DAY 50 MINUTES: CPT | Performed by: INTERNAL MEDICINE

## 2020-08-17 PROCEDURE — 96360 HYDRATION IV INFUSION INIT: CPT

## 2020-08-17 PROCEDURE — 71045 X-RAY EXAM CHEST 1 VIEW: CPT

## 2020-08-17 PROCEDURE — 81001 URINALYSIS AUTO W/SCOPE: CPT | Performed by: INTERNAL MEDICINE

## 2020-08-17 PROCEDURE — 83735 ASSAY OF MAGNESIUM: CPT | Performed by: EMERGENCY MEDICINE

## 2020-08-17 PROCEDURE — 63710000001 INSULIN ASPART PER 5 UNITS: Performed by: NURSE PRACTITIONER

## 2020-08-17 PROCEDURE — 96361 HYDRATE IV INFUSION ADD-ON: CPT

## 2020-08-17 PROCEDURE — 93306 TTE W/DOPPLER COMPLETE: CPT

## 2020-08-17 PROCEDURE — 93005 ELECTROCARDIOGRAM TRACING: CPT | Performed by: EMERGENCY MEDICINE

## 2020-08-17 PROCEDURE — 63710000001 INSULIN ASPART PER 5 UNITS: Performed by: INTERNAL MEDICINE

## 2020-08-17 RX ORDER — DEXTROSE MONOHYDRATE 25 G/50ML
25 INJECTION, SOLUTION INTRAVENOUS
Status: DISCONTINUED | OUTPATIENT
Start: 2020-08-17 | End: 2020-08-19 | Stop reason: HOSPADM

## 2020-08-17 RX ORDER — ASPIRIN 81 MG/1
81 TABLET ORAL DAILY
Status: DISCONTINUED | OUTPATIENT
Start: 2020-08-17 | End: 2020-08-19 | Stop reason: HOSPADM

## 2020-08-17 RX ORDER — SODIUM CHLORIDE 0.9 % (FLUSH) 0.9 %
10 SYRINGE (ML) INJECTION AS NEEDED
Status: DISCONTINUED | OUTPATIENT
Start: 2020-08-17 | End: 2020-08-19 | Stop reason: HOSPADM

## 2020-08-17 RX ORDER — ALUMINA, MAGNESIA, AND SIMETHICONE 2400; 2400; 240 MG/30ML; MG/30ML; MG/30ML
15 SUSPENSION ORAL EVERY 6 HOURS PRN
Status: DISCONTINUED | OUTPATIENT
Start: 2020-08-17 | End: 2020-08-19 | Stop reason: HOSPADM

## 2020-08-17 RX ORDER — BISACODYL 5 MG/1
5 TABLET, DELAYED RELEASE ORAL DAILY PRN
Status: DISCONTINUED | OUTPATIENT
Start: 2020-08-17 | End: 2020-08-19 | Stop reason: HOSPADM

## 2020-08-17 RX ORDER — BUDESONIDE 0.5 MG/2ML
0.25 INHALANT ORAL
Status: DISCONTINUED | OUTPATIENT
Start: 2020-08-17 | End: 2020-08-19 | Stop reason: HOSPADM

## 2020-08-17 RX ORDER — ONDANSETRON 4 MG/1
4 TABLET, FILM COATED ORAL EVERY 6 HOURS PRN
Status: DISCONTINUED | OUTPATIENT
Start: 2020-08-17 | End: 2020-08-19 | Stop reason: HOSPADM

## 2020-08-17 RX ORDER — AMITRIPTYLINE HYDROCHLORIDE 25 MG/1
25 TABLET, FILM COATED ORAL NIGHTLY
Status: DISCONTINUED | OUTPATIENT
Start: 2020-08-17 | End: 2020-08-19 | Stop reason: HOSPADM

## 2020-08-17 RX ORDER — CHOLECALCIFEROL (VITAMIN D3) 125 MCG
5 CAPSULE ORAL NIGHTLY PRN
Status: DISCONTINUED | OUTPATIENT
Start: 2020-08-17 | End: 2020-08-19 | Stop reason: HOSPADM

## 2020-08-17 RX ORDER — ONDANSETRON 2 MG/ML
4 INJECTION INTRAMUSCULAR; INTRAVENOUS EVERY 6 HOURS PRN
Status: DISCONTINUED | OUTPATIENT
Start: 2020-08-17 | End: 2020-08-19 | Stop reason: HOSPADM

## 2020-08-17 RX ORDER — PANTOPRAZOLE SODIUM 40 MG/1
40 TABLET, DELAYED RELEASE ORAL EVERY MORNING
Status: DISCONTINUED | OUTPATIENT
Start: 2020-08-17 | End: 2020-08-19 | Stop reason: HOSPADM

## 2020-08-17 RX ORDER — MONTELUKAST SODIUM 10 MG/1
10 TABLET ORAL NIGHTLY
Status: DISCONTINUED | OUTPATIENT
Start: 2020-08-17 | End: 2020-08-19 | Stop reason: HOSPADM

## 2020-08-17 RX ORDER — FLUTICASONE PROPIONATE 50 MCG
1 SPRAY, SUSPENSION (ML) NASAL DAILY
Status: DISCONTINUED | OUTPATIENT
Start: 2020-08-17 | End: 2020-08-19 | Stop reason: HOSPADM

## 2020-08-17 RX ORDER — NICOTINE POLACRILEX 4 MG
1 LOZENGE BUCCAL
Status: DISCONTINUED | OUTPATIENT
Start: 2020-08-17 | End: 2020-08-19 | Stop reason: HOSPADM

## 2020-08-17 RX ORDER — TIZANIDINE 4 MG/1
4 TABLET ORAL EVERY 6 HOURS PRN
Status: DISCONTINUED | OUTPATIENT
Start: 2020-08-17 | End: 2020-08-19 | Stop reason: HOSPADM

## 2020-08-17 RX ORDER — CLOPIDOGREL BISULFATE 75 MG/1
75 TABLET ORAL DAILY
Status: DISCONTINUED | OUTPATIENT
Start: 2020-08-17 | End: 2020-08-19 | Stop reason: HOSPADM

## 2020-08-17 RX ORDER — SODIUM CHLORIDE 0.9 % (FLUSH) 0.9 %
10 SYRINGE (ML) INJECTION EVERY 12 HOURS SCHEDULED
Status: DISCONTINUED | OUTPATIENT
Start: 2020-08-17 | End: 2020-08-19 | Stop reason: HOSPADM

## 2020-08-17 RX ORDER — ACETAMINOPHEN 325 MG/1
650 TABLET ORAL EVERY 4 HOURS PRN
Status: DISCONTINUED | OUTPATIENT
Start: 2020-08-17 | End: 2020-08-19 | Stop reason: HOSPADM

## 2020-08-17 RX ORDER — GABAPENTIN 300 MG/1
600 CAPSULE ORAL EVERY 8 HOURS SCHEDULED
Status: DISCONTINUED | OUTPATIENT
Start: 2020-08-17 | End: 2020-08-19 | Stop reason: HOSPADM

## 2020-08-17 RX ORDER — CETIRIZINE HYDROCHLORIDE 10 MG/1
10 TABLET ORAL DAILY
Status: DISCONTINUED | OUTPATIENT
Start: 2020-08-17 | End: 2020-08-19 | Stop reason: HOSPADM

## 2020-08-17 RX ORDER — MECLIZINE HCL 12.5 MG/1
25 TABLET ORAL 3 TIMES DAILY PRN
Status: DISCONTINUED | OUTPATIENT
Start: 2020-08-17 | End: 2020-08-19 | Stop reason: HOSPADM

## 2020-08-17 RX ORDER — IPRATROPIUM BROMIDE AND ALBUTEROL SULFATE 2.5; .5 MG/3ML; MG/3ML
3 SOLUTION RESPIRATORY (INHALATION)
Status: DISCONTINUED | OUTPATIENT
Start: 2020-08-17 | End: 2020-08-19 | Stop reason: HOSPADM

## 2020-08-17 RX ORDER — SODIUM CHLORIDE 9 MG/ML
75 INJECTION, SOLUTION INTRAVENOUS CONTINUOUS
Status: DISCONTINUED | OUTPATIENT
Start: 2020-08-17 | End: 2020-08-18

## 2020-08-17 RX ORDER — ATORVASTATIN CALCIUM 80 MG/1
80 TABLET, FILM COATED ORAL NIGHTLY
Status: DISCONTINUED | OUTPATIENT
Start: 2020-08-17 | End: 2020-08-19 | Stop reason: HOSPADM

## 2020-08-17 RX ADMIN — CETIRIZINE HYDROCHLORIDE 10 MG: 10 TABLET, FILM COATED ORAL at 08:44

## 2020-08-17 RX ADMIN — CLOPIDOGREL BISULFATE 75 MG: 75 TABLET ORAL at 08:44

## 2020-08-17 RX ADMIN — SODIUM CHLORIDE 1000 ML: 9 INJECTION, SOLUTION INTRAVENOUS at 00:14

## 2020-08-17 RX ADMIN — HUMAN INSULIN 4 UNITS: 100 INJECTION, SOLUTION SUBCUTANEOUS at 12:15

## 2020-08-17 RX ADMIN — IPRATROPIUM BROMIDE AND ALBUTEROL SULFATE 3 ML: .5; 3 SOLUTION RESPIRATORY (INHALATION) at 15:38

## 2020-08-17 RX ADMIN — SODIUM CHLORIDE 100 ML/HR: 9 INJECTION, SOLUTION INTRAVENOUS at 04:04

## 2020-08-17 RX ADMIN — INSULIN ASPART 5 UNITS: 100 INJECTION, SOLUTION INTRAVENOUS; SUBCUTANEOUS at 04:47

## 2020-08-17 RX ADMIN — GABAPENTIN 600 MG: 300 CAPSULE ORAL at 06:50

## 2020-08-17 RX ADMIN — MONTELUKAST 10 MG: 10 TABLET, FILM COATED ORAL at 21:20

## 2020-08-17 RX ADMIN — INSULIN ASPART 8 UNITS: 100 INJECTION, SOLUTION INTRAVENOUS; SUBCUTANEOUS at 17:52

## 2020-08-17 RX ADMIN — SODIUM CHLORIDE 1000 ML: 9 INJECTION, SOLUTION INTRAVENOUS at 01:39

## 2020-08-17 RX ADMIN — GABAPENTIN 600 MG: 300 CAPSULE ORAL at 21:20

## 2020-08-17 RX ADMIN — INSULIN DETEMIR 60 UNITS: 100 INJECTION, SOLUTION SUBCUTANEOUS at 17:53

## 2020-08-17 RX ADMIN — HUMAN INSULIN 4 UNITS: 100 INJECTION, SOLUTION SUBCUTANEOUS at 06:50

## 2020-08-17 RX ADMIN — INSULIN ASPART 10 UNITS: 100 INJECTION, SOLUTION INTRAVENOUS; SUBCUTANEOUS at 17:51

## 2020-08-17 RX ADMIN — SODIUM CHLORIDE, PRESERVATIVE FREE 10 ML: 5 INJECTION INTRAVENOUS at 21:21

## 2020-08-17 RX ADMIN — AMITRIPTYLINE HYDROCHLORIDE 25 MG: 25 TABLET, FILM COATED ORAL at 21:20

## 2020-08-17 RX ADMIN — VORTIOXETINE 20 MG: 20 TABLET, FILM COATED ORAL at 15:45

## 2020-08-17 RX ADMIN — IPRATROPIUM BROMIDE AND ALBUTEROL SULFATE 3 ML: .5; 3 SOLUTION RESPIRATORY (INHALATION) at 18:45

## 2020-08-17 RX ADMIN — SODIUM CHLORIDE 100 ML/HR: 9 INJECTION, SOLUTION INTRAVENOUS at 15:42

## 2020-08-17 RX ADMIN — ASPIRIN 81 MG: 81 TABLET, COATED ORAL at 08:44

## 2020-08-17 RX ADMIN — GABAPENTIN 600 MG: 300 CAPSULE ORAL at 15:42

## 2020-08-17 RX ADMIN — IPRATROPIUM BROMIDE AND ALBUTEROL SULFATE 3 ML: .5; 3 SOLUTION RESPIRATORY (INHALATION) at 06:40

## 2020-08-17 RX ADMIN — PANTOPRAZOLE SODIUM 40 MG: 40 TABLET, DELAYED RELEASE ORAL at 06:50

## 2020-08-17 RX ADMIN — TIZANIDINE 4 MG: 4 TABLET ORAL at 09:34

## 2020-08-17 RX ADMIN — BUDESONIDE 0.25 MG: 0.5 INHALANT RESPIRATORY (INHALATION) at 18:45

## 2020-08-17 RX ADMIN — ATORVASTATIN CALCIUM 80 MG: 80 TABLET, FILM COATED ORAL at 21:20

## 2020-08-17 NOTE — OUTREACH NOTE
Medical Week 3 Survey      Responses   [facility] patient discharged from?  [place]   COVID-19 Test Status  Negative   Does the patient have one of the following disease processes/diagnoses(primary or secondary)?  Other   Week 3 attempt successful?  No   Revoke  Readmitted          Alyssa Yung RN

## 2020-08-17 NOTE — PROGRESS NOTES
Discharge Planning Assessment  [facility]     Patient Name: Denzel Harding  MRN: [id number]  Today's Date: 8/17/2020    Admit Date: 8/16/2020    Discharge Needs Assessment     Row Name 08/17/20 1407       Discharge Needs Assessment    Current Discharge Risk  chronically ill    Row Name 08/17/20 1400       Living Environment    Name(s) of Who Lives With Patient  Wife, Imandra and daughter     Primary Care Provided by  self    Provides Primary Care For  no one    Family Caregiver if Needed  none    Quality of Family Relationships  unable to assess    Able to Return to Prior Arrangements  yes    Living Arrangement Comments  Lives in a one story house, has steps but also has a ramp       Discharge Needs Assessment    Readmission Within the Last 30 Days  no previous admission in last 30 days    Concerns to be Addressed  no discharge needs identified    Concerns Comments  Interested in a Living Will.  Written information given and will contact SW when ready to sign    Equipment Currently Used at Home  bipap/cpap    Anticipated Changes Related to Illness  none    Equipment Needed After Discharge  none    Provided Post Acute Provider List?  N/A    N/A Provider List Comment  No DME, HH or Skilled nursing needs        Discharge Plan     Row Name 08/17/20 1406       Plan    Plan Spoke to pt in room.  Has no POA or Living Will, but would like information about it.  Lives with  wife and daughter In a 1 story house. Address and phone no verified. Has steps but also has a ramp.  No HH, but does have a Bipap , cane , and a nebulizer.  States the bipap was supplied by [facility] in [place]. Called to [facility] at   Called to [place] and verified did set up bipap in OCT 2019, pressure is 13/5 with 70% humidification.  Plans to go home at discharge and wife will transport.  Written information given to pt on Living Will.  Will contact Cm/Tiffanie if needs signature notarized.  Cm will continue to follow.                  Expected Discharge Date and Time     Expected Discharge Date Expected Discharge Time    Aug 18, 2020         Demographic Summary     Row Name 08/17/20 1356       General Information    Admission Type  observation    Arrived From  observation    Required Notices Provided  Observation Status Notice    Expected Length of Stay (LOS)  less then 2 midnights     Referral Source  admission list    Reason for Consult  discharge planning     Used During This Interaction  no        Functional Status     Row Name 08/17/20 1356       Functional Status    Functional Status Comments  Independent       Functional Status, IADL    Medications  independent    Meal Preparation  assistive person    Housekeeping  assistive person    Laundry  assistive person    Shopping  assistive person    IADL Comments  Self       Mental Status    General Appearance WDL  WDL       Mental Status Summary    Recent Changes in Mental Status/Cognitive Functioning  no changes        Psychosocial    No documentation.       Abuse/Neglect    No documentation.       Legal    No documentation.       Substance Abuse    No documentation.       Patient Forms    No documentation.           Jayne Michele RN

## 2020-08-17 NOTE — H&P
[facility]   HISTORY AND PHYSICAL      Name:  Denzel Harding   Age:  60 y.o.  Sex:  male  :  1960  MRN:  [id number]   Visit Number:  [id number]  Admission Date:  2020  Date Of Service:  20  Primary Care Physician:  Isaura Godoy MD    Chief Complaint:     Extreme dizziness    History Of Presenting Illness:    60-year-old male with complaints of dizziness, orthostasis, starting this morning.  Similar complaints and admission earlier this month.  Blood pressure systolic was 84 in ER.  Last admission his amlodipine was discontinued, metoprolol was decreased to 25 twice daily.  But patient is on Bumex 2 mg a day.  Creatinine is up to 1.8.  EKG, chest ray, troponin unrevealing.  Was given 2 L of IV fluids in ER with systolic blood pressure of 126, still orthostatic.  Denies shortness of breath, chest pain or chest pressure, denies syncope, denies lateralizing weakness upper or lower extremities.  Denies nausea, vomiting, diarrhea, denies cough or productive sputum.  Claims of    Review Of Systems:  General: Patient denies any fevers, chills or loss of consciousness.   Psychological: Denies any hallucinations and delusions, no homicidal or suicidal ideations.  Ophthalmic: Denies any diplopia or transient loss of vision.  ENT: Denies sore throat, nasal congestion or ear pain.   Allergy and Immunology: Denies rash or itching.  Hematological and Lymphatic: Denies neck swelling or easy bleeding.  Endocrine: Denies any recent unintentional weight gain or loss.  Respiratory: Denies cough, shortness of breath, hemoptysis, or pleurisy.  Cardiovascular: Denies chest pain or palpitations. No history of exertional chest pain.   Gastrointestinal: Denies nausea and vomiting. Denies any abdominal pain. No diarrhea.  Genito-Urinary: Denies dysuria or hematuria.  Neurological: Denies any focal weakness. No loss of consciousness. Denies any numbness. Denies neck pain. Denies visual  changes.  Positive for dizziness especially upon standing   Dermatological: Denies any redness or pruritis, or rash.    ROS otherwise reviewed in detail and felt to be noncontributing.     Past Medical History:    Past Medical History:   Diagnosis Date   • Anxiety    • Arthritis    • Asthma    • Brachial neuritis 1/19/2017   • Cellulitis     left arm and right leg    • Chest pain    • CHF (congestive heart failure) (CMS/[facility])     Patient reported history May 2020    • COPD (chronic obstructive pulmonary disease) (CMS/[facility])    • Coronary artery disease     Patient reported CABG , 3 vessel   • Depression    • Diabetes mellitus (CMS/[facility])     diagnosed in 1998, checks fsbg 3-4x/day   • Dizziness    • Edema    • Elevated cholesterol    • GERD (gastroesophageal reflux disease)    • H/O chest x-ray 07/04/2015    Mild Left base atelectasis   • H/O echocardiogram 07/05/2015    Normal LVSF. EF of 60-65%. Grade 1 diastolic dysfunction of the LV myocardium. No evidence of pericardial effusion   • H/O exercise stress test    • Hearing loss     No use of hearing aids   • History of fracture     Reported 2 bones in left foot and a finger   • History of herniated intervertebral disc     History of left L5-S1 disc herniation   • History of pneumonia    • History of pneumonia    • Hypertension    • LOM (loss of memory) 1/19/2017   • Lower back pain     Right   • Measles    • Neuropathy     feet    • EDD treated with BiPAP     BiPAP HS - instructed to bring mask/machine DOS (setting 13 and 5)   • Palpitations    • Pericardial effusion    • Poor dentition     Patient reported missing multiple teeth   • Renal disorder    • Seizure disorder (CMS/[facility])    • Seizures (CMS/[facility])     FOCAL last 2009   • Shortness of breath 1/19/2017   • SOB (shortness of breath)    • Staph infection     back after sugery at the incision site    • Stroke (CMS/[facility])     x2 most recent , slight weakness in hands occasionaly    • Wears glasses         Past Surgical history:    Past Surgical History:   Procedure Laterality Date   • BACK SURGERY     • CARDIAC CATHETERIZATION     • CARDIAC CATHETERIZATION N/A 8/11/2017    Procedure: Coronary angiography;  Surgeon: Dallas Hernandez MD;  Location: [facility] CATH INVASIVE LOCATION;  Service:    • CARDIAC CATHETERIZATION N/A 8/11/2017    Procedure: Left Heart Cath;  Surgeon: Dallas Hernandez MD;  Location: [facility] CATH INVASIVE LOCATION;  Service:    • CARDIAC CATHETERIZATION N/A 8/11/2017    Procedure: Left ventriculography;  Surgeon: Dallas Hernandez MD;  Location: [facility] CATH INVASIVE LOCATION;  Service:    • CARDIAC CATHETERIZATION      2002 x1 stent,  x1 stent   • CARDIOVASCULAR STRESS TEST  07/03/2017    WITH DR HERNANDEZ AT [facility]   • CARPAL TUNNEL RELEASE Right    • CATARACT EXTRACTION W/ INTRAOCULAR LENS IMPLANT Right 6/12/2017    Procedure: CATARACT PHACO EXTRACTION WITH INTRAOCULAR LENS IMPLANT RIGHT ;  Surgeon: Natalie Cao MD;  Location: [facility] OR;  Service:    • CATARACT EXTRACTION W/ INTRAOCULAR LENS IMPLANT Left 7/10/2017    Procedure: CATARACT PHACO EXTRACTION WITH INTRAOCULAR LENS IMPLANT LEFT;  Surgeon: Natalie Cao MD;  Location: [facility] OR;  Service:    • CHOLECYSTECTOMY     • COLONOSCOPY     • COLONOSCOPY N/A 7/2/2020    Procedure: COLONOSCOPY;  Surgeon: Petra Crowell MD;  Location: [facility] ENDOSCOPY;  Service: Gastroenterology;  Laterality: N/A;  poor prep   • CORONARY ANGIOPLASTY WITH STENT PLACEMENT      X1-LAD   • CORONARY ARTERY BYPASS GRAFT N/A 8/15/2017    Procedure: CORONARY ARTERY BYPASS GRAFTING x 3 UTILIZING THE LEFT INTERNAL MAMMARY ARTERY WITH ENDOSCOPIC VEIN HARVESTING OF THE RIGHT GREATER SAPHENOUS VEIN, HAMLET, LAD ENDARECTOMY;  Surgeon: London Damon MD;  Location: [facility] OR;  Service:    • ENDOSCOPY     • EYE CAPSULOTOMY WITH LASER Right 11/25/2019    Procedure: EYE CAPSULOTOMY WITH LASER RIGHT;  Surgeon: Natalie Cao MD;  Location: [facility] OR;  Service:  Ophthalmology   • EYE CAPSULOTOMY WITH LASER Left 2019    Procedure: EYE CAPSULOTOMY WITH LASER LEFT;  Surgeon: Natalie Cao MD;  Location: [facility] OR;  Service: Ophthalmology   • EYE SURGERY      cataract surgery both eyes   • INTERVENTIONAL RADIOLOGY PROCEDURE Bilateral 2019    Procedure: Carotid Cerebral Angiogram;  Surgeon: Damian Dubois MD;  Location:  [facility] CATH INVASIVE LOCATION;  Service: Interventional Radiology   • KNEE ARTHROSCOPY Bilateral    • KNEE ARTHROSCOPY Right     Dr Lewis   • KNEE ARTHROSCOPY Left     Dr Jameson   • MOUTH SURGERY      Oral extractions   • NEUROPLASTY / TRANSPOSITION ULNAR NERVE AT ELBOW Left    • OTHER SURGICAL HISTORY      Foraminotomy and discectomy   • PERICARDIAL WINDOW N/A 2017    Procedure: PERICARDIAL WINDOW;  Surgeon: Freeman Phillips MD;  Location:  [facility] OR;  Service:        Social History:    Social History     Socioeconomic History   • Marital status:      Spouse name: Not on file   • Number of children: 1   • Years of education: Not on file   • Highest education level: Not on file   Occupational History   • Occupation: [employer]     Employer: DISABLED     Comment: Disabled since -Back Problems   Tobacco Use   • Smoking status: Former Smoker     Packs/day: 1.00     Years: 10.00     Pack years: 10.00     Types: Cigarettes     Last attempt to quit: 1998     Years since quittin.6   • Smokeless tobacco: Former User     Types: Snuff     Quit date:    Substance and Sexual Activity   • Alcohol use: Yes     Frequency: Monthly or less     Drinks per session: 3 or 4     Comment: 3 PER YEAR   • Drug use: No   • Sexual activity: Defer   Social History Narrative    Caffeine use: 0-1 serving daily.    Patient lives at home with wife.        Family History:    Family History   Problem Relation Age of Onset   • Hypertension Mother    • Arthritis Mother    • Alcohol abuse Father    • Heart disease Other    • Stroke  Other    • Hypertension Other    • Other Other         Neurologic disorder   • Parkinsonism Other    • Stroke Other    • Heart disease Other    • Hypertension Other    • Heart disease Other    • Stroke Other    • Hypertension Other        Allergies:      Sulfa antibiotics; Invokana [canagliflozin]; Codeine; and Morphine    Home Medications:    Prior to Admission Medications     Prescriptions Last Dose Informant Patient Reported? Taking?    albuterol sulfate HFA (Ventolin HFA) 108 (90 Base) MCG/ACT inhaler  Self No No    Inhale 2 puffs Every 4 (Four) Hours As Needed for Wheezing or Shortness of Air.    amitriptyline (ELAVIL) 25 MG tablet  Self No No    Take 3 tablets by mouth every night at bedtime.    ASMANEX  MCG/ACT aerosol  Self No No    Inhale 1 puff 2 (Two) Times a Day.    aspirin 81 MG EC tablet  Self Yes No    Take 81 mg by mouth Daily.    atorvastatin (LIPITOR) 80 MG tablet  Self No No    Take 1 tablet by mouth Every Night.    azelastine (ASTELIN) 0.1 % nasal spray  Self No No    1 spray into the nostril(s) as directed by provider 2 (Two) Times a Day As Needed for Rhinitis or Allergies. Use in each nostril as directed    budesonide-formoterol (Symbicort) 80-4.5 MCG/ACT inhaler  Self No No    Inhale 2 puffs 2 (Two) Times a Day. Rinse mouth with water after use.    bumetanide (BUMEX) 2 MG tablet  Self No No    Take 1 tablet by mouth Daily. Take extra 2mg as needed for 3lb wt gain in 24hrs, increased shortness of breath    clopidogrel (PLAVIX) 75 MG tablet  Self Yes No    Take 75 mg by mouth Daily.    coenzyme Q10 100 MG capsule  Self Yes No    Take 100 mg by mouth Daily.    CYCLOSET 0.8 MG tablet  Self Yes No    Take 3.2 mg by mouth Every Morning.    DUPIXENT 300 MG/2ML solution prefilled syringe  Self No No    Inject 300 mg under the skin into the appropriate area as directed Every 14 (Fourteen) Days.    flunisolide (NASALIDE) 25 MCG/ACT (0.025%) solution nasal spray  Self No No    Inhale 2 sprays  Every 12 (Twelve) Hours.    Patient taking differently:  Inhale 2 sprays 2 (Two) Times a Day As Needed.    gabapentin (NEURONTIN) 600 MG tablet  Self Yes No    Take 600 mg by mouth 3 (Three) Times a Day.    HUMALOG KWIKPEN 100 UNIT/ML solution pen-injector  Self No No    Inject 10 Units under the skin 3 (Three) Times a Day With Meals. Follows SSI From PCP    Patient taking differently:  Inject 50 Units under the skin into the appropriate area as directed 3 (Three) Times a Day With Meals. Follows SSI From PCP    ipratropium-albuterol (DUO-NEB) 0.5-2.5 mg/3 ml nebulizer  Self No No    Take 3 mL by nebulization 4 (Four) Times a Day As Needed for Wheezing or Shortness of Air.    levocetirizine (XYZAL) 5 MG tablet  Self No No    Take 1 tablet by mouth Every Evening.    lisinopril (PRINIVIL,ZESTRIL) 5 MG tablet  Self Yes No    Take 5 mg by mouth Daily.    meclizine (ANTIVERT) 25 MG tablet  Self No No    Take 1 tablet by mouth 3 (Three) Times a Day As Needed for dizziness.    metoprolol tartrate (LOPRESSOR) 50 MG tablet   No No    Take 0.5 tablets by mouth 2 (Two) Times a Day.    omeprazole (priLOSEC) 20 MG capsule  Self No No    Take 1 capsule by mouth Daily.    OZEMPIC, 0.25 OR 0.5 MG/DOSE, 2 MG/1.5ML solution pen-injector  Self Yes No    Inject 0.5 mg under the skin into the appropriate area as directed 1 (One) Time Per Week.    potassium chloride (K-DUR,KLOR-CON) 10 MEQ CR tablet   Yes No    Take 10 mEq by mouth Daily.    promethazine (PHENERGAN) 25 MG tablet  Self No No    Take 1 tablet by mouth Every 6 (Six) Hours As Needed for Nausea or Vomiting.    spironolactone (ALDACTONE) 25 MG tablet  Self No No    Take 1 tablet by mouth Daily.    Tiotropium Bromide Monohydrate (SPIRIVA RESPIMAT) 1.25 MCG/ACT aerosol solution inhaler  Self No No    Inhale 2 puffs Daily.    TiZANidine (Zanaflex) 4 MG capsule  Self No No    Take 1 capsule by mouth At Night As Needed for Muscle Spasms.    TOUJEO SOLOSTAR 300 UNIT/ML solution  pen-injector  Self No No    Inject 60 Units as directed Daily.    Patient taking differently:  Inject 120 Units as directed 2 (two) times a day.    TURMERIC PO  Self Yes No    Take 500 mg by mouth 2 (Two) Times a Day.    vitamin C (ASCORBIC ACID) 500 MG tablet  Self Yes No    Take 500 mg by mouth Daily.    Vortioxetine HBr (Trintellix) 20 MG tablet  Self No No    Take 20 mg by mouth Daily.    zafirlukast (Accolate) 20 MG tablet   No No    Take 1 tablet by mouth 2 (Two) Times a Day.             ED Medications:    Medications   sodium chloride 0.9 % flush 10 mL (has no administration in time range)   sodium chloride 0.9 % bolus 1,000 mL (0 mL Intravenous Stopped 8/17/20 0137)   sodium chloride 0.9 % bolus 1,000 mL (1,000 mL Intravenous New Bag 8/17/20 0139)       Vital Signs:    Temp:  [98.7 °F (37.1 °C)] 98.7 °F (37.1 °C)  Heart Rate:  [76-89] 79  Resp:  [18] 18  BP: ()/(47-71) 126/71        08/16/20  2346   Weight: 134 kg (296 lb)       Body mass index is 42.47 kg/m².    Physical Exam:    General Appearance:  Alert and cooperative, not in any acute distress.   Head:  Atraumatic and normocephalic, without obvious abnormality.   Eyes:          PERRLA, conjunctivae and sclerae normal, no Icterus. No pallor. Extra-occular movements are within normal limits.   Throat: No oral lesions, no thrush, oral mucosa moist.   Neck: Supple, trachea midline, no thyromegaly, no carotid bruit. No JVD.   Back:   No kyphoscoliosis present. No tenderness to palpation,   range of motion normal.   Lungs:   Chest shape is normal. Breath sounds heard bilaterally equally.  No crackles or wheezing. No Pleural rub or bronchial breathing. Otherwise lungs are clear.   Heart:  Normal S1 and S2, no murmur, no gallop, no rub. No JVD. Regular rate and rhythm.   Abdomen:   Normal bowel sounds, no masses, no organomegaly. Soft non-tender, non-distended, no guarding, no rebound tenderness.   Extremities: Moves all extremities well, no edema, no  cyanosis, no clubbing.   Pulses: Pulses palpable and equal bilaterally.   Skin: No bleeding, bruising or rash.   Neurologic: Alert and oriented x 3. Moves all four limbs equally. No tremors. No facial asymetry. Cranial nerves 2-12 intact. Musculosensory exam intact.   Psychiatric: Mood and affect normal. Denies homicidal or suicidal ideations.     Laboratory data:    I have reviewed the labs done in the emergency room.    Results from last 7 days   Lab Units 08/17/20  0012   SODIUM mmol/L 134*   POTASSIUM mmol/L 4.7   CHLORIDE mmol/L 96*   CO2 mmol/L 27.9   BUN mg/dL 17   CREATININE mg/dL 1.80*   CALCIUM mg/dL 9.5   BILIRUBIN mg/dL 0.7   ALK PHOS U/L 92   ALT (SGPT) U/L 86*   AST (SGOT) U/L 65*   GLUCOSE mg/dL 213*     Results from last 7 days   Lab Units 08/17/20  0012   WBC 10*3/mm3 9.92   HEMOGLOBIN g/dL 12.9*   HEMATOCRIT % 37.7   PLATELETS 10*3/mm3 218         Results from last 7 days   Lab Units 08/17/20  0012   TROPONIN T ng/mL 0.026                           Invalid input(s): USDES,  BLOODU, NITRITITE, BACT, EP  Pain Management Panel     Pain Management Panel Latest Ref Rng & Units 10/15/2019 9/14/2019    AMPHETAMINES SCREEN, URINE Negative Negative Negative    BARBITURATES SCREEN Negative Negative Negative    BENZODIAZEPINE SCREEN, URINE Negative Negative Negative    BUPRENORPHINEUR Negative Negative Negative    COCAINE SCREEN, URINE Negative Negative Negative    METHADONE SCREEN, URINE Negative Negative Negative    METHAMPHETAMINEUR Negative Negative Negative              EKG:      No acute changes    Radiology:    Imaging Results (Last 72 Hours)     Procedure Component Value Units Date/Time    XR Chest 1 View [[id number]] Resulted:  08/17/20 0028     Updated:  08/17/20 0028          Assessment:  1.  Hypotension secondary to most likely diuretics  2.  Dehydration and acute kidney injury secondary to diuretics  3.  History of coronary disease/diabetes mellitus/GERD/anxiety/CVA/EDD/COPD    Plan:   Continue IV  fluid hydration 100 cc/h for another 24 hours.  Check BMP and CBC in a.m. and chest ray.  Patient designates himself a full code.  Blood sugar checks every 6 hours with insulin supplementation.  Suspect he may benefit from decreasing his diuretics or discontinuing them upon discharge, will leave this up to the hospitalist team at discharge.  Continue appropriate home meds.      Saurabh Abad MD  08/17/20  02:34    Portions of this note may have been completed with Dragon, a voice recognition program. Not all errors in transcription may have been detected prior to signing.

## 2020-08-17 NOTE — PLAN OF CARE
Problem: Patient Care Overview  Goal: Plan of Care Review  Outcome: Ongoing (interventions implemented as appropriate)  Flowsheets (Taken 8/17/2020 1227)  Outcome Summary: PT eval completed.   Patient able to perform all mobility with conditional independence/supervision. Ambulation limited this date d/t orthostasis but patient does not require skilled intervention at this time. Recommend he ambulate in his room and in the hallways with PRN assistance for safety to remain free of complications of immobility.

## 2020-08-17 NOTE — PROGRESS NOTES
Adult Nutrition  Assessment/PES    Patient Name:  Denzel Harding  YOB: 1960  MRN: [id number]  Admit Date:  8/16/2020    Assessment Date:  8/17/2020    Comments:  Rec. #1: Continue with diet as ordered. Pt. Consuming ~100% of meals on average per nursing. RD to follow pt. Consult RD PRN.     Reason for Assessment     Row Name 08/17/20 1424          Reason for Assessment    Reason For Assessment  diagnosis/disease state     Diagnosis  cardiac disease;pulmonary disease;neurologic conditions;fluid status CHF, COPD, DM Type 2, Memory Loss, Orthostasis, dehydration, JUAN             Labs/Tests/Procedures/Meds     Row Name 08/17/20 1425          Labs/Procedures/Meds    Lab Results Reviewed  reviewed, pertinent     Lab Results Comments  High: Gluc, Cr  Low: Na        Medications    Pertinent Medications Reviewed  reviewed, pertinent           Estimated/Assessed Needs     Row Name 08/17/20 1426          Calculation Measurements    Weight Used For Calculations  76.5 kg (168 lb 9.7 oz)        Estimated/Assessed Needs    Additional Documentation  [place]. Jeor Equation (Group);Calorie Requirements (Group);Fluid Requirements (Group);Protein Requirements (Group)        Calorie Requirements    Estimated Calorie Requirement Comment  2061-2261        [place]-St. Jeor Equation    RMR ([place]-St. Jeor Equation)  1586.05        Protein Requirements    Weight Used For Protein Calculations  76.5 kg (168 lb 9.7 oz)     Est Protein Requirement Amount (gms/kg)  1.5 gm protein  gm/day     Estimated Protein Requirements (gms/day)  114.72        Fluid Requirements    Estimated Fluid Requirement Method  [place]-Segar Formula     [place] Method (over 20 kg)  3029.6         Nutrition Prescription Ordered     Row Name 08/17/20 1427          Nutrition Prescription PO    Current PO Diet  Regular     Common Modifiers  Cardiac;Consistent Carbohydrate         Evaluation of Received Nutrient/Fluid Intake     Row Name  08/17/20 1427          PO Evaluation    Number of Days PO Intake Evaluated  1 day     Number of Meals  2     % PO Intake  100               Problem/Interventions:  Problem 1     Row Name 08/17/20 1427          Nutrition Diagnoses Problem 1    Problem 1  Increased Nutrient Needs     Macronutrient  Kcal;Protein     Etiology (related to)  Medical Diagnosis     Pulmonary/Critical Care  COPD     Signs/Symptoms (evidenced by)  Other (comment) pulmonary dysfunction         Problem 2     Row Name 08/17/20 1428          Nutrition Diagnoses Problem 2    Problem 2  Impaired Nutrient Utilization     Etiology (related to)  Medical Diagnosis     Endocrine  DM Type 2     Signs/Symptoms (evidenced by)  Biochemical     Specific Labs Noted  Glucose             Intervention Goal     Row Name 08/17/20 1428          Intervention Goal    General  Meet nutritional needs for age/condition     PO  PO intake (%)     PO Intake %  -- 50-75     Weight  No significant weight loss         Nutrition Intervention     Row Name 08/17/20 1428          Nutrition Intervention    RD/Tech Action  Follow Tx progress         Nutrition Prescription     Row Name 08/17/20 1428          Nutrition Prescription PO    PO Prescription  -- continue with diet as ordered         Education/Evaluation     Row Name 08/17/20 1428          Education    Education  Education not appropriate at this time     Please explain  Other (comment) Pt. with memory problems noted        Monitor/Evaluation    Monitor  Per protocol;PO intake;Pertinent labs;Weight;Skin status           Electronically signed by:  Martha Lee RD  08/17/20 14:30

## 2020-08-17 NOTE — PLAN OF CARE
Problem: Patient Care Overview  Goal: Plan of Care Review  Outcome: Ongoing (interventions implemented as appropriate)  Flowsheets (Taken 8/17/2020 5269)  Progress: no change  Plan of Care Reviewed With: patient  Note:   New admit.  VSS.  No change to pt condition to report during shift.  Will continue to monitor.

## 2020-08-17 NOTE — PLAN OF CARE
Problem: Patient Care Overview  Goal: Plan of Care Review  Outcome: Ongoing (interventions implemented as appropriate)  Flowsheets (Taken 8/17/2020 9337)  Progress: improving  Plan of Care Reviewed With: patient     Problem: Patient Care Overview  Goal: Individualization and Mutuality  Outcome: Ongoing (interventions implemented as appropriate)     Problem: Pain, Chronic (Adult)  Goal: Identify Related Risk Factors and Signs and Symptoms  Outcome: Ongoing (interventions implemented as appropriate)     Problem: Pain, Chronic (Adult)  Goal: Acceptable Pain/Comfort Level and Functional Ability  Outcome: Ongoing (interventions implemented as appropriate)     Problem: Fall Risk (Adult)  Goal: Identify Related Risk Factors and Signs and Symptoms  Outcome: Ongoing (interventions implemented as appropriate)     Problem: Fall Risk (Adult)  Goal: Absence of Fall  Outcome: Ongoing (interventions implemented as appropriate)

## 2020-08-17 NOTE — THERAPY DISCHARGE NOTE
Acute Care - Occupational Therapy Initial Eval/Discharge   Don     Patient Name: Denzel Harding  : 1960  MRN: [id number]  Today's Date: 2020  Onset of Illness/Injury or Date of Surgery: 20  Date of Referral to OT: 20  Referring Physician: ANTONIO Franklin      Admit Date: 2020       ICD-10-CM ICD-9-CM   1. Lightheadedness R42 780.4   2. Hypotension, unspecified hypotension type I95.9 458.9   3. Acute renal failure, unspecified acute renal failure type (CMS/[facility]) N17.9 584.9   4. Lactic acidosis E87.2 276.2   5. Impaired mobility and ADLs Z74.09 V49.89    Z78.9      Patient Active Problem List   Diagnosis   • Coronary arteriosclerosis in native artery   • Lumbar radiculopathy   • Hypercholesterolemia   • Diabetic polyneuropathy associated with type 2 diabetes mellitus (CMS/[facility])   • Migraine with aura and with status migrainosus   • Palpitations   • Seizure disorder (CMS/[facility])   • GERD (gastroesophageal reflux disease)   • Brachial neuritis   • Cervical radiculopathy   • LOM (loss of memory)   • Anxiety   • Diabetes mellitus (CMS/[facility])   • Lower back pain   • Hypertension   • History of CVA (cerebrovascular accident)   • Non morbid obesity due to excess calories   • Post-infarction pericarditis (CMS/[facility])   • HCAP (healthcare-associated pneumonia)   • CVA (S/P tPA)   • Acute bronchitis   • Type 2 diabetes mellitus (CMS/[facility])   • Headache syndrome, complicated   • Chronic intractable headache   • Obstructive sleep apnea syndrome   • Acute exacerbation of asthma with allergic rhinitis   • Asthma   • Thrush   • Acute diastolic CHF (congestive heart failure) (CMS/[facility])   • Chronic diastolic CHF (congestive heart failure) (CMS/[facility])   • COPD (chronic obstructive pulmonary disease) (CMS/[facility])   • JUAN (acute kidney injury) (CMS/[facility])   • Acute sinusitis   • [place] ronnie's stroke   • Vertebrobasilar insufficiency   • Personal history of colonic polyps   • Pre-syncope   • Lactic acidosis    • Orthostatic hypotension     Past Medical History:   Diagnosis Date   • Anxiety    • Arthritis    • Asthma    • Brachial neuritis 1/19/2017   • Cellulitis     left arm and right leg    • Chest pain    • CHF (congestive heart failure) (CMS/[facility])     Patient reported history May 2020    • COPD (chronic obstructive pulmonary disease) (CMS/[facility])    • Coronary artery disease     Patient reported CABG , 3 vessel   • Depression    • Diabetes mellitus (CMS/[facility])     diagnosed in 1998, checks fsbg 3-4x/day   • Dizziness    • Edema    • Elevated cholesterol    • GERD (gastroesophageal reflux disease)    • H/O chest x-ray 07/04/2015    Mild Left base atelectasis   • H/O echocardiogram 07/05/2015    Normal LVSF. EF of 60-65%. Grade 1 diastolic dysfunction of the LV myocardium. No evidence of pericardial effusion   • H/O exercise stress test    • Hearing loss     No use of hearing aids   • History of fracture     Reported 2 bones in left foot and a finger   • History of herniated intervertebral disc     History of left L5-S1 disc herniation   • History of pneumonia    • History of pneumonia    • Hypertension    • Impaired functional mobility, balance, gait, and endurance    • LOM (loss of memory) 1/19/2017   • Lower back pain     Right   • Measles    • Neuropathy     feet    • EDD treated with BiPAP     BiPAP HS - instructed to bring mask/machine DOS (setting 13 and 5)   • Palpitations    • Pericardial effusion    • Poor dentition     Patient reported missing multiple teeth   • Renal disorder    • Seizure disorder (CMS/[facility])    • Seizures (CMS/[facility])     FOCAL last 2009   • Shortness of breath 1/19/2017   • SOB (shortness of breath)    • Staph infection     back after sugery at the incision site    • Stroke (CMS/[facility])     x2 most recent , slight weakness in hands occasionaly    • Wears glasses      Past Surgical History:   Procedure Laterality Date   • BACK SURGERY     • CARDIAC CATHETERIZATION     • CARDIAC  CATHETERIZATION N/A 8/11/2017    Procedure: Coronary angiography;  Surgeon: Dallas Hernandez MD;  Location: [facility] CATH INVASIVE LOCATION;  Service:    • CARDIAC CATHETERIZATION N/A 8/11/2017    Procedure: Left Heart Cath;  Surgeon: Dallas Hernandez MD;  Location: [facility] CATH INVASIVE LOCATION;  Service:    • CARDIAC CATHETERIZATION N/A 8/11/2017    Procedure: Left ventriculography;  Surgeon: Dallas Hernandez MD;  Location: [facility] CATH INVASIVE LOCATION;  Service:    • CARDIAC CATHETERIZATION      2002 x1 stent,  x1 stent   • CARDIOVASCULAR STRESS TEST  07/03/2017    WITH DR HERNANDEZ AT [facility]   • CARPAL TUNNEL RELEASE Right    • CATARACT EXTRACTION W/ INTRAOCULAR LENS IMPLANT Right 6/12/2017    Procedure: CATARACT PHACO EXTRACTION WITH INTRAOCULAR LENS IMPLANT RIGHT ;  Surgeon: Natalie Cao MD;  Location: [facility] OR;  Service:    • CATARACT EXTRACTION W/ INTRAOCULAR LENS IMPLANT Left 7/10/2017    Procedure: CATARACT PHACO EXTRACTION WITH INTRAOCULAR LENS IMPLANT LEFT;  Surgeon: Natalie Cao MD;  Location: [facility] OR;  Service:    • CHOLECYSTECTOMY     • COLONOSCOPY     • COLONOSCOPY N/A 7/2/2020    Procedure: COLONOSCOPY;  Surgeon: Petra Crowell MD;  Location: [facility] ENDOSCOPY;  Service: Gastroenterology;  Laterality: N/A;  poor prep   • CORONARY ANGIOPLASTY WITH STENT PLACEMENT      X1-LAD   • CORONARY ARTERY BYPASS GRAFT N/A 8/15/2017    Procedure: CORONARY ARTERY BYPASS GRAFTING x 3 UTILIZING THE LEFT INTERNAL MAMMARY ARTERY WITH ENDOSCOPIC VEIN HARVESTING OF THE RIGHT GREATER SAPHENOUS VEIN, HAMELT, LAD ENDARECTOMY;  Surgeon: London Damon MD;  Location: [facility] OR;  Service:    • ENDOSCOPY     • EYE CAPSULOTOMY WITH LASER Right 11/25/2019    Procedure: EYE CAPSULOTOMY WITH LASER RIGHT;  Surgeon: Natalie Cao MD;  Location: [facility] OR;  Service: Ophthalmology   • EYE CAPSULOTOMY WITH LASER Left 12/9/2019    Procedure: EYE CAPSULOTOMY WITH LASER LEFT;  Surgeon: Natalie Cao MD;  Location: [facility]  OR;  Service: Ophthalmology   • EYE SURGERY      cataract surgery both eyes   • INTERVENTIONAL RADIOLOGY PROCEDURE Bilateral 12/31/2019    Procedure: Carotid Cerebral Angiogram;  Surgeon: Damian Dubois MD;  Location: [facility] INVASIVE LOCATION;  Service: Interventional Radiology   • KNEE ARTHROSCOPY Bilateral    • KNEE ARTHROSCOPY Right 2010    Dr Lewis   • KNEE ARTHROSCOPY Left 2008    Dr Jameson   • MOUTH SURGERY      Oral extractions   • NEUROPLASTY / TRANSPOSITION ULNAR NERVE AT ELBOW Left    • OTHER SURGICAL HISTORY      Foraminotomy and discectomy   • PERICARDIAL WINDOW N/A 8/25/2017    Procedure: PERICARDIAL WINDOW;  Surgeon: Freeman Phillips MD;  Location: [facility] OR;  Service:           OT ASSESSMENT FLOWSHEET (last 12 hours)      Occupational Therapy Evaluation     Row Name 08/17/20 1228                   OT Evaluation Time/Intention    Subjective Information  no complaints  -        Document Type  discharge evaluation/summary  -        Mode of Treatment  occupational therapy  -        Patient Effort  good  -        Symptoms Noted During/After Treatment  dizziness  -           General Information    Patient Profile Reviewed?  yes  -        Onset of Illness/Injury or Date of Surgery  08/16/20  -        Referring Physician  ANTONIO Franklin  -        Patient Observations  alert;cooperative;agree to therapy  -        Patient/Family Observations  Pt alone  -        General Observations of Patient  Pt received supine in bed   -        Prior Level of Function  independent:;community mobility;ADL's  -        Equipment Currently Used at Home  cane, straight;rollator;bipap/ cpap  -        Existing Precautions/Restrictions  fall  -        Risks Reviewed  patient:;increased discomfort  -        Benefits Reviewed  patient:;improve function;increase independence;increase strength  -           Relationship/Environment    Lives With  spouse  -            Resource/Environmental Concerns    Current Living Arrangements  home/apartment/condo  -           Cognitive Assessment/Intervention- PT/OT    Orientation Status (Cognition)  oriented x 4  -        Follows Commands (Cognition)  WFL  -           Safety Issues, Functional Mobility    Safety Issues Affecting Function (Mobility)  safety precaution awareness;safety precautions follow-through/compliance  -           Bed Mobility Assessment/Treatment    Bed Mobility Assessment/Treatment  supine-sit  -        Supine-Sit [place] (Bed Mobility)  conditional independence  -        Assistive Device (Bed Mobility)  bed rails;head of bed elevated  -           Functional Mobility    Functional Mobility- Ind. Level  standby assist  -        Functional Mobility- Device  straight cane  -        Functional Mobility-Distance (Feet)  12  -        Functional Mobility- Comment  Pt walked 12' to bathroom and 20' to his chair.  Pt limited d/t c/o light-headedness  -           Transfer Assessment/Treatment    Transfer Assessment/Treatment  sit-stand transfer;stand-sit transfer;toilet transfer  -           Sit-Stand Transfer    Sit-Stand [place] (Transfers)  supervision  -        Assistive Device (Sit-Stand Transfers)  cane, straight  -           Stand-Sit Transfer    Stand-Sit [place] (Transfers)  supervision  -        Assistive Device (Stand-Sit Transfers)  cane, straight  -           Toilet Transfer    Type (Toilet Transfer)  sit-stand;stand-sit  -        [place] Level (Toilet Transfer)  independent  -        Assistive Device (Toilet Transfer)  commode  -           ADL Assessment/Intervention    BADL Assessment/Intervention  bathing;upper body dressing;lower body dressing;grooming;feeding;toileting  -           Bathing Assessment/Intervention    Bathing [place] Level  set up  -           Upper Body Dressing Assessment/Training    Upper Body Dressing [place] Level   independent  -           Lower Body Dressing Assessment/Training    Lower Body Dressing [place] Level  independent  -           Grooming Assessment/Training    [place] Level (Grooming)  independent  -           Self-Feeding Assessment/Training    [place] Level (Feeding)  independent  -           Toileting Assessment/Training    [place] Level (Toileting)  independent  -           General ROM    GENERAL ROM COMMENTS  BUE WFL  -           MMT (Manual Muscle Testing)    General MMT Comments  BUE WFL  -           Positioning and Restraints    Pre-Treatment Position  in bed  -        Post Treatment Position  chair  -        In Chair  sitting;call light within reach;encouraged to call for assist  -           Pain Scale: Numbers Pre/Post-Treatment    Pain Scale: Numbers, Pretreatment  0/10 - no pain  -        Pain Scale: Numbers, Post-Treatment  0/10 - no pain  -           Coping    Observed Emotional State  accepting;cooperative  -        Verbalized Emotional State  acceptance  -           Plan of Care Review    Plan of Care Reviewed With  patient  -        Progress  no change  -        Outcome Summary  Pt seen for OT evaluation today.  Pt able to complete self care and functional mobility tasks.  Pt has no skilled OT needs at this time.   -           Clinical Impression (OT)    Date of Referral to OT  08/17/20  -        Patient/Family Goals Statement (OT Eval)  Pt wants to d/c home  -        Criteria for Skilled Therapeutic Interventions Met (OT Eval)  no problems identified which require skilled intervention  -        Therapy Frequency (OT Eval)  evaluation only  -        Care Plan Review (OT)  evaluation/treatment results reviewed;patient/other agree to care plan  -        Anticipated Discharge Disposition (OT)  home  -           Living Environment    Home Accessibility  wheelchair accessible  -          User Key  (r) = Recorded By, (t) = Taken By, (c)  = Cosigned By    Initials Name Effective Dates    Emmy Scott 03/07/18 -           Occupational Therapy Education                 Title: PT OT SLP Therapies (Not Started)     Topic: Occupational Therapy (In Progress)     Point: ADL training (Done)     Description:   Instruct learner(s) on proper safety adaptation and remediation techniques during self care or transfers.   Instruct in proper use of assistive devices.              Learning Progress Summary           Patient Acceptance, E,TB, VU by  at 8/17/2020 3632    Comment:  purpose of OT                   Point: Home exercise program (Not Started)     Description:   Instruct learner(s) on appropriate technique for monitoring, assisting and/or progressing therapeutic exercises/activities.              Learner Progress:   Not documented in this visit.          Point: Precautions (Not Started)     Description:   Instruct learner(s) on prescribed precautions during self-care and functional transfers.              Learner Progress:   Not documented in this visit.          Point: Body mechanics (Not Started)     Description:   Instruct learner(s) on proper positioning and spine alignment during self-care, functional mobility activities and/or exercises.              Learner Progress:   Not documented in this visit.                      User Key     Initials Effective Dates Name Provider Type Discipline     03/07/18 -  Emmy Reagan Occupational Therapist OT                OT Recommendation and Plan  Outcome Summary/Treatment Plan (OT)  Anticipated Discharge Disposition (OT): home  Therapy Frequency (OT Eval): evaluation only  Plan of Care Review  Plan of Care Reviewed With: patient  Plan of Care Reviewed With: patient  Outcome Summary: Pt seen for OT evaluation today.  Pt able to complete self care and functional mobility tasks.  Pt has no skilled OT needs at this time.          Outcome Measures     Row Name 08/17/20 9378             How much help from  another is currently needed...    Putting on and taking off regular lower body clothing?  4  -AH      Bathing (including washing, rinsing, and drying)  4  -AH      Toileting (which includes using toilet bed pan or urinal)  4  -AH      Putting on and taking off regular upper body clothing  4  -AH      Taking care of personal grooming (such as brushing teeth)  4  -AH      Eating meals  4  -AH      AM-PAC 6 Clicks Score (OT)  24  -         Functional Assessment    Outcome Measure Options  AM-PAC 6 Clicks Daily Activity (OT)  -        User Key  (r) = Recorded By, (t) = Taken By, (c) = Cosigned By    Initials Name Provider Type    Emmy Scott Occupational Therapist          Time Calculation:   Time Calculation- OT     Row Name 08/17/20 1339             Time Calculation- OT    OT Start Time  1228  -      OT Received On  08/17/20  -        User Key  (r) = Recorded By, (t) = Taken By, (c) = Cosigned By    Initials Name Provider Type    Emmy Scott Occupational Therapist        Therapy Suggested Charges     Code   Minutes Charges    None           Therapy Charges for Today     Code Description Service Date Service Provider Modifiers Qty    [id number]  OT EVAL LOW COMPLEXITY 3 8/17/2020 Emmy Reagan GO 1               OT Discharge Summary  Anticipated Discharge Disposition (OT): home    Emmy Reagan  8/17/2020

## 2020-08-17 NOTE — THERAPY DISCHARGE NOTE
Patient Name: Denzel Harding  : 1960    MRN: [id number]                              Today's Date: 2020       Admit Date: 2020    Visit Dx:     ICD-10-CM ICD-9-CM   1. Lightheadedness R42 780.4   2. Hypotension, unspecified hypotension type I95.9 458.9   3. Acute renal failure, unspecified acute renal failure type (CMS/[facility]) N17.9 584.9   4. Lactic acidosis E87.2 276.2     Patient Active Problem List   Diagnosis   • Coronary arteriosclerosis in native artery   • Lumbar radiculopathy   • Hypercholesterolemia   • Diabetic polyneuropathy associated with type 2 diabetes mellitus (CMS/[facility])   • Migraine with aura and with status migrainosus   • Palpitations   • Seizure disorder (CMS/[facility])   • GERD (gastroesophageal reflux disease)   • Brachial neuritis   • Cervical radiculopathy   • LOM (loss of memory)   • Anxiety   • Diabetes mellitus (CMS/HCC)   • Lower back pain   • Hypertension   • History of CVA (cerebrovascular accident)   • Non morbid obesity due to excess calories   • Post-infarction pericarditis (CMS/HCC)   • HCAP (healthcare-associated pneumonia)   • CVA (S/P tPA)   • Acute bronchitis   • Type 2 diabetes mellitus (CMS/[facility])   • Headache syndrome, complicated   • Chronic intractable headache   • Obstructive sleep apnea syndrome   • Acute exacerbation of asthma with allergic rhinitis   • Asthma   • Thrush   • Acute diastolic CHF (congestive heart failure) (CMS/HCC)   • Chronic diastolic CHF (congestive heart failure) (CMS/HCC)   • COPD (chronic obstructive pulmonary disease) (CMS/HCC)   • JUAN (acute kidney injury) (CMS/HCC)   • Acute sinusitis   • Chappell ronnie's stroke   • Vertebrobasilar insufficiency   • Personal history of colonic polyps   • Pre-syncope   • Lactic acidosis   • Orthostatic hypotension     Past Medical History:   Diagnosis Date   • Anxiety    • Arthritis    • Asthma    • Brachial neuritis 2017   • Cellulitis     left arm and right leg    • Chest pain    • CHF (congestive heart  failure) (CMS/[facility])     Patient reported history May 2020    • COPD (chronic obstructive pulmonary disease) (CMS/[facility])    • Coronary artery disease     Patient reported CABG , 3 vessel   • Depression    • Diabetes mellitus (CMS/[facility])     diagnosed in 1998, checks fsbg 3-4x/day   • Dizziness    • Edema    • Elevated cholesterol    • GERD (gastroesophageal reflux disease)    • H/O chest x-ray 07/04/2015    Mild Left base atelectasis   • H/O echocardiogram 07/05/2015    Normal LVSF. EF of 60-65%. Grade 1 diastolic dysfunction of the LV myocardium. No evidence of pericardial effusion   • H/O exercise stress test    • Hearing loss     No use of hearing aids   • History of fracture     Reported 2 bones in left foot and a finger   • History of herniated intervertebral disc     History of left L5-S1 disc herniation   • History of pneumonia    • History of pneumonia    • Hypertension    • Impaired functional mobility, balance, gait, and endurance    • LOM (loss of memory) 1/19/2017   • Lower back pain     Right   • Measles    • Neuropathy     feet    • EDD treated with BiPAP     BiPAP HS - instructed to bring mask/machine DOS (setting 13 and 5)   • Palpitations    • Pericardial effusion    • Poor dentition     Patient reported missing multiple teeth   • Renal disorder    • Seizure disorder (CMS/[facility])    • Seizures (CMS/[facility])     FOCAL last 2009   • Shortness of breath 1/19/2017   • SOB (shortness of breath)    • Staph infection     back after sugery at the incision site    • Stroke (CMS/[facility])     x2 most recent , slight weakness in hands occasionaly    • Wears glasses      Past Surgical History:   Procedure Laterality Date   • BACK SURGERY     • CARDIAC CATHETERIZATION     • CARDIAC CATHETERIZATION N/A 8/11/2017    Procedure: Coronary angiography;  Surgeon: Dallas Kim MD;  Location: [facility] CATH INVASIVE LOCATION;  Service:    • CARDIAC CATHETERIZATION N/A 8/11/2017    Procedure: Left Heart Cath;  Surgeon:  Dallas Hernandez MD;  Location: [facility] CATH INVASIVE LOCATION;  Service:    • CARDIAC CATHETERIZATION N/A 8/11/2017    Procedure: Left ventriculography;  Surgeon: Dallas Hernandez MD;  Location: [facility] CATH INVASIVE LOCATION;  Service:    • CARDIAC CATHETERIZATION      2002 x1 stent,  x1 stent   • CARDIOVASCULAR STRESS TEST  07/03/2017    WITH DR HERNANDEZ AT [facility]   • CARPAL TUNNEL RELEASE Right    • CATARACT EXTRACTION W/ INTRAOCULAR LENS IMPLANT Right 6/12/2017    Procedure: CATARACT PHACO EXTRACTION WITH INTRAOCULAR LENS IMPLANT RIGHT ;  Surgeon: Natalie Cao MD;  Location: [facility] OR;  Service:    • CATARACT EXTRACTION W/ INTRAOCULAR LENS IMPLANT Left 7/10/2017    Procedure: CATARACT PHACO EXTRACTION WITH INTRAOCULAR LENS IMPLANT LEFT;  Surgeon: Natalie Cao MD;  Location: [facility] OR;  Service:    • CHOLECYSTECTOMY     • COLONOSCOPY     • COLONOSCOPY N/A 7/2/2020    Procedure: COLONOSCOPY;  Surgeon: Petra Crowell MD;  Location: [facility] ENDOSCOPY;  Service: Gastroenterology;  Laterality: N/A;  poor prep   • CORONARY ANGIOPLASTY WITH STENT PLACEMENT      X1-LAD   • CORONARY ARTERY BYPASS GRAFT N/A 8/15/2017    Procedure: CORONARY ARTERY BYPASS GRAFTING x 3 UTILIZING THE LEFT INTERNAL MAMMARY ARTERY WITH ENDOSCOPIC VEIN HARVESTING OF THE RIGHT GREATER SAPHENOUS VEIN, HAMLET, LAD ENDARECTOMY;  Surgeon: London Damon MD;  Location: [facility];  Service:    • ENDOSCOPY     • EYE CAPSULOTOMY WITH LASER Right 11/25/2019    Procedure: EYE CAPSULOTOMY WITH LASER RIGHT;  Surgeon: Natalie Cao MD;  Location: [facility] OR;  Service: Ophthalmology   • EYE CAPSULOTOMY WITH LASER Left 12/9/2019    Procedure: EYE CAPSULOTOMY WITH LASER LEFT;  Surgeon: Natalie Cao MD;  Location: [facility] OR;  Service: Ophthalmology   • EYE SURGERY      cataract surgery both eyes   • INTERVENTIONAL RADIOLOGY PROCEDURE Bilateral 12/31/2019    Procedure: Carotid Cerebral Angiogram;  Surgeon: Damian Dubois MD;  Location: [facility] CATH  INVASIVE LOCATION;  Service: Interventional Radiology   • KNEE ARTHROSCOPY Bilateral    • KNEE ARTHROSCOPY Right 2010    Dr Lewis   • KNEE ARTHROSCOPY Left 2008    Dr Jameson   • MOUTH SURGERY      Oral extractions   • NEUROPLASTY / TRANSPOSITION ULNAR NERVE AT ELBOW Left    • OTHER SURGICAL HISTORY      Foraminotomy and discectomy   • PERICARDIAL WINDOW N/A 8/25/2017    Procedure: PERICARDIAL WINDOW;  Surgeon: Freeman Phillips MD;  Location: [facility] OR;  Service:      General Information     Row Name 08/17/20 1254 08/17/20 1227       PT Evaluation Time/Intention    Document Type  discharge evaluation/summary  -LM  evaluation  -LM    Mode of Treatment  --  physical therapy  -LM    Row Name 08/17/20 1254 08/17/20 1227       General Information    Patient Profile Reviewed?  --  yes  -LM    Prior Level of Function  community mobility  -LM  independent:;all household mobility  -LM    Existing Precautions/Restrictions  --  fall  -LM    Barriers to Rehab  none identified  -LM  --    Row Name 08/17/20 1227          Relationship/Environment    Lives With  spouse  -LM     Row Name 08/17/20 1227          Resource/Environmental Concerns    Current Living Arrangements  home/apartment/condo  -LM     Row Name 08/17/20 1227          Home Main Entrance    Number of Stairs, Main Entrance  other (see comments) Ramp to enter home.  -LM     Row Name 08/17/20 1227          Stairs Within Home, Primary    Number of Stairs, Within Home, Primary  none  -LM     Row Name 08/17/20 1227          Cognitive Assessment/Intervention- PT/OT    Orientation Status (Cognition)  oriented x 4  -LM     Row Name 08/17/20 1254 08/17/20 1227       Safety Issues, Functional Mobility    Safety Issues Affecting Function (Mobility)  --  safety precautions follow-through/compliance  -LM    Impairments Affecting Function (Mobility)  --  endurance/activity tolerance;balance  -LM    Comment, Safety Issues/Impairments (Mobility)  Pt currently becomes orthostatic with  ambulation d/t dehydration.  -LM  --      User Key  (r) = Recorded By, (t) = Taken By, (c) = Cosigned By    Initials Name Provider Type    Sophia Lewis, NARAYAN Physical Therapist        Mobility     Row Name 08/17/20 1227          Bed Mobility Assessment/Treatment    Bed Mobility Assessment/Treatment  supine-sit  -LM     Supine-Sit [place] (Bed Mobility)  conditional independence  -LM     Assistive Device (Bed Mobility)  bed rails;head of bed elevated  -LM     Row Name 08/17/20 1227          Transfer Assessment/Treatment    Comment (Transfers)  I commode transfer.  -LM     Row Name 08/17/20 1227          Sit-Stand Transfer    Sit-Stand [place] (Transfers)  supervision  -LM     Assistive Device (Sit-Stand Transfers)  cane, straight  -LM     Row Name 08/17/20 1255 08/17/20 1227       Gait/Stairs Assessment/Training    Gait/Stairs Assessment/Training  --  gait/ambulation assistive device  -LM    Humble Level (Gait)  --  supervision  -LM    Assistive Device (Gait)  --  cane, straight  -LM    Distance in Feet (Gait)  12'x1 and 20'x1; Ambulation limited d/t dizziness/orthostasis.  -LM  --    Pattern (Gait)  step-through  -LM  --      User Key  (r) = Recorded By, (t) = Taken By, (c) = Cosigned By    Initials Name Provider Type    LM Sophia Oliver PT Physical Therapist        Obj/Interventions     Row Name 08/17/20 1227          General ROM    GENERAL ROM COMMENTS  WFL  -LM     Row Name 08/17/20 1227          MMT (Manual Muscle Testing)    General MMT Comments  WFL  -LM     Row Name 08/17/20 1227          Static Sitting Balance    Level of [place] (Unsupported Sitting, Static Balance)  independent  -LM     Sitting Position (Unsupported Sitting, Static Balance)  sitting on edge of bed  -LM     Row Name 08/17/20 1227          Dynamic Sitting Balance    Level of [place], Reaches Outside Midline (Sitting, Dynamic Balance)  independent  -LM     Sitting Position, Reaches Outside Midline (Sitting,  Dynamic Balance)  sitting on edge of bed  -LM     Row Name 08/17/20 1227          Static Standing Balance    Level of [place] (Supported Standing, Static Balance)  supervision  -     Assistive Device Utilized (Supported Standing, Static Balance)  cane, straight  -LM     Row Name 08/17/20 1227          Dynamic Standing Balance    Level of [place], Reaches Outside Midline (Standing, Dynamic Balance)  supervision  -LM     Assistive Device Utilized (Supported Standing, Dynamic Balance)  cane, straight  -LM       User Key  (r) = Recorded By, (t) = Taken By, (c) = Cosigned By    Initials Name Provider Type    LM Sophia Oliver, PT Physical Therapist        Goals/Plan    No documentation.       Clinical Impression     [facility] Name 08/17/20 1227          Pain Assessment    Additional Documentation  Pain Scale: Numbers Pre/Post-Treatment (Group)  -[facility] Name 08/17/20 [facility]7          Pain Scale: Numbers Pre/Post-Treatment    Pain Scale: Numbers, Pretreatment  0/10 - no pain  -LM     Pain Scale: Numbers, Post-Treatment  0/10 - no pain  -LM     [facility] Name 08/17/20 1227          Plan of Care Review    Plan of Care Reviewed With  patient  -LM     Progress  no change  -[facility] Name 08/17/20 [facility]          Physical Therapy Clinical Impression    Patient/Family Goals Statement (PT Clinical Impression)  Return home.  -LM     Criteria for Skilled Interventions Met (PT Clinical Impression)  no;no problems identified which require skilled intervention  -LM     Row Name 08/17/20 1227          Vital Signs    O2 Delivery Pre Treatment  room air  -LM     O2 Delivery Intra Treatment  room air  -LM     O2 Delivery Post Treatment  room air  -LM     [facility] Name 08/17/20 1227          Positioning and Restraints    Pre-Treatment Position  in bed  -LM     Post Treatment Position  chair  -LM     In Chair  sitting;call light within reach;encouraged to call for assist;notified nsg  -       User Key  (r) = Recorded By, (t) = Taken By, (c) =  Cosigned By    Initials Name Provider Type    Sophia Lewis, PT Physical Therapist        Outcome Measures     Row Name 08/17/20 1227          How much help from another person do you currently need...    Turning from your back to your side while in flat bed without using bedrails?  4  -LM     Moving from lying on back to sitting on the side of a flat bed without bedrails?  4  -LM     Moving to and from a bed to a chair (including a wheelchair)?  4  -LM     Standing up from a chair using your arms (e.g., wheelchair, bedside chair)?  4  -LM     Climbing 3-5 steps with a railing?  3  -LM     To walk in hospital room?  4  -LM     AM-PAC 6 Clicks Score (PT)  23  -LM     Row Name 08/17/20 1227          Functional Assessment    Outcome Measure Options  AM-PAC 6 Clicks Basic Mobility (PT)  -LM       User Key  (r) = Recorded By, (t) = Taken By, (c) = Cosigned By    Initials Name Provider Type    Sophia Lewis PT Physical Therapist        Physical Therapy Education                 Title: PT OT SLP Therapies (Not Started)     Topic: Physical Therapy (In Progress)     Point: Mobility training (Done)     Description:   Instruct learner(s) on safety and technique for assisting patient out of bed, chair or wheelchair.  Instruct in the proper use of assistive devices, such as walker, crutches, cane or brace.              Patient Friendly Description:   It's important to get you on your feet again, but we need to do so in a way that is safe for you. Falling has serious consequences, and your personal safety is the most important thing of all.        When it's time to get out of bed, one of us or a family member will sit next to you on the bed to give you support.     If your doctor or nurse tells you to use a walker, crutches, a cane, or a brace, be sure you use it every time you get out of bed, even if you think you don't need it.    Learning Progress Summary           Patient Acceptance, E,TB, VU by JANEY at 8/17/2020 9830     Comment:  Purpose of PT.                   Point: Home exercise program (Done)     Description:   Instruct learner(s) on appropriate technique for monitoring, assisting and/or progressing patient with therapeutic exercises and activities.              Learning Progress Summary           Patient Acceptance, E,TB, VU by  at 8/17/2020 1302    Comment:  Purpose of PT.                   Point: Body mechanics (Not Started)     Description:   Instruct learner(s) on proper positioning and spine alignment for patient and/or caregiver during mobility tasks and/or exercises.              Learner Progress:   Not documented in this visit.          Point: Precautions (Done)     Description:   Instruct learner(s) on prescribed precautions during mobility and gait tasks              Learning Progress Summary           Patient Acceptance, E,TB, VU by  at 8/17/2020 1302    Comment:  Purpose of PT.                               User Key     Initials Effective Dates Name Provider Type [facility] 04/03/18 -  Sophia Oliver, PT Physical Therapist PT              PT Recommendation and Plan     Outcome Summary/Treatment Plan (PT)  Anticipated Equipment Needs at Discharge (PT): shower chair  Anticipated Discharge Disposition (PT): home with assist  Plan of Care Reviewed With: patient  Progress: no change  Outcome Summary: PT eval completed.   Patient able to perform all mobility with conditional independence/supervision. Ambulation limited this date d/t orthostasis but patient does not require skilled intervention at this time. Recommend he ambulate in his room and in the hallways with PRN assistance for safety to remain free of complications of immobility.     Time Calculation:   PT Charges     Row Name 08/17/20 1304             Time Calculation    Start Time  1227  -LM      PT Received On  08/17/20  -        User Key  (r) = Recorded By, (t) = Taken By, (c) = Cosigned By    Initials Name Provider Type    Sophia Lewis, PT  Physical Therapist        Therapy Charges for Today     Code Description Service Date Service Provider Modifiers Qty    [id number] HC PT EVAL LOW COMPLEXITY 4 8/17/2020 Sophia Oliver, PT GP 1          PT G-Codes  Outcome Measure Options: AM-PAC 6 Clicks Basic Mobility (PT)  AM-PAC 6 Clicks Score (PT): 23    PT Discharge Summary  Anticipated Discharge Disposition (PT): home with assist    Sophia Oliver, PT  8/17/2020

## 2020-08-17 NOTE — PLAN OF CARE
Problem: Patient Care Overview  Goal: Plan of Care Review  Outcome: Ongoing (interventions implemented as appropriate)  Flowsheets (Taken 8/17/2020 1228)  Progress: no change  Plan of Care Reviewed With: patient  Outcome Summary: Pt seen for OT evaluation today.  Pt able to complete self care and functional mobility tasks.  Pt has no skilled OT needs at this time.

## 2020-08-17 NOTE — ED NOTES
Contacted House Supervisor regarding admission.  She assigned room 406.     Peter, April Lise  08/17/20 1121

## 2020-08-17 NOTE — ED PROVIDER NOTES
Subjective   60-year-old male presenting with dizziness.  He states that all day today he has felt lightheaded.  His blood pressure was low so came in for evaluation.  Patient notes about a week ago was at the hospital at [facility], was having issues with low blood pressure, had extensive work-up, had 1 of his blood pressure medications have another stopped completely.  Had been doing okay until this morning.  He denies any chest pain, abdominal pain, nausea, vomiting, fevers, chills or other complaints.          Review of Systems   Constitutional: Negative.    HENT: Negative.    Eyes: Negative.    Respiratory: Negative.    Cardiovascular: Negative.    Gastrointestinal: Negative.    Genitourinary: Negative.    Musculoskeletal: Negative.    Skin: Negative.    Neurological: Positive for light-headedness. Negative for dizziness, seizures, syncope, facial asymmetry, speech difficulty, weakness, numbness and headaches.   Psychiatric/Behavioral: Negative.        Past Medical History:   Diagnosis Date   • Anxiety    • Arthritis    • Asthma    • Brachial neuritis 1/19/2017   • Cellulitis     left arm and right leg    • Chest pain    • CHF (congestive heart failure) (CMS/[facility])     Patient reported history May 2020    • COPD (chronic obstructive pulmonary disease) (CMS/[facility])    • Coronary artery disease     Patient reported CABG , 3 vessel   • Depression    • Diabetes mellitus (CMS/[facility])     diagnosed in 1998, checks fsbg 3-4x/day   • Dizziness    • Edema    • Elevated cholesterol    • GERD (gastroesophageal reflux disease)    • H/O chest x-ray 07/04/2015    Mild Left base atelectasis   • H/O echocardiogram 07/05/2015    Normal LVSF. EF of 60-65%. Grade 1 diastolic dysfunction of the LV myocardium. No evidence of pericardial effusion   • H/O exercise stress test    • Hearing loss     No use of hearing aids   • History of fracture     Reported 2 bones in left foot and a finger   • History of herniated  intervertebral disc     History of left L5-S1 disc herniation   • History of pneumonia    • History of pneumonia    • Hypertension    • LOM (loss of memory) 1/19/2017   • Lower back pain     Right   • Measles    • Neuropathy     feet    • EDD treated with BiPAP     BiPAP HS - instructed to bring mask/machine DOS (setting 13 and 5)   • Palpitations    • Pericardial effusion    • Poor dentition     Patient reported missing multiple teeth   • Renal disorder    • Seizure disorder (CMS/HCC)    • Seizures (CMS/HCC)     FOCAL last 2009   • Shortness of breath 1/19/2017   • SOB (shortness of breath)    • Staph infection     back after sugery at the incision site    • Stroke (CMS/HCC)     x2 most recent , slight weakness in hands occasionaly    • Wears glasses        Allergies   Allergen Reactions   • Sulfa Antibiotics Anaphylaxis, Nausea And Vomiting and Delirium   • Invokana [Canagliflozin] Unknown - Low Severity     DKA   • Codeine Nausea And Vomiting     Can only take with Phenergan   • Morphine Unknown - Low Severity     Does not work. It causes pain       Past Surgical History:   Procedure Laterality Date   • BACK SURGERY     • CARDIAC CATHETERIZATION     • CARDIAC CATHETERIZATION N/A 8/11/2017    Procedure: Coronary angiography;  Surgeon: Dallas Kim MD;  Location: [facility] INVASIVE LOCATION;  Service:    • CARDIAC CATHETERIZATION N/A 8/11/2017    Procedure: Left Heart Cath;  Surgeon: Dallas Kim MD;  Location: [facility] INVASIVE LOCATION;  Service:    • CARDIAC CATHETERIZATION N/A 8/11/2017    Procedure: Left ventriculography;  Surgeon: Dallas Kim MD;  Location: [facility] INVASIVE LOCATION;  Service:    • CARDIAC CATHETERIZATION      2002 x1 stent,  x1 stent   • CARDIOVASCULAR STRESS TEST  07/03/2017    WITH DR KIM AT [facility]   • CARPAL TUNNEL RELEASE Right    • CATARACT EXTRACTION W/ INTRAOCULAR LENS IMPLANT Right 6/12/2017    Procedure: CATARACT PHACO EXTRACTION WITH  INTRAOCULAR LENS IMPLANT RIGHT ;  Surgeon: Natalie Cao MD;  Location: [facility] OR;  Service:    • CATARACT EXTRACTION W/ INTRAOCULAR LENS IMPLANT Left 7/10/2017    Procedure: CATARACT PHACO EXTRACTION WITH INTRAOCULAR LENS IMPLANT LEFT;  Surgeon: Natalie Cao MD;  Location: [facility] OR;  Service:    • CHOLECYSTECTOMY     • COLONOSCOPY     • COLONOSCOPY N/A 7/2/2020    Procedure: COLONOSCOPY;  Surgeon: Petra Crowell MD;  Location: [facility] ENDOSCOPY;  Service: Gastroenterology;  Laterality: N/A;  poor prep   • CORONARY ANGIOPLASTY WITH STENT PLACEMENT      X1-LAD   • CORONARY ARTERY BYPASS GRAFT N/A 8/15/2017    Procedure: CORONARY ARTERY BYPASS GRAFTING x 3 UTILIZING THE LEFT INTERNAL MAMMARY ARTERY WITH ENDOSCOPIC VEIN HARVESTING OF THE RIGHT GREATER SAPHENOUS VEIN, HAMLET, LAD ENDARECTOMY;  Surgeon: London Damon MD;  Location:  [facility] OR;  Service:    • ENDOSCOPY     • EYE CAPSULOTOMY WITH LASER Right 11/25/2019    Procedure: EYE CAPSULOTOMY WITH LASER RIGHT;  Surgeon: Natalie Cao MD;  Location: [facility] OR;  Service: Ophthalmology   • EYE CAPSULOTOMY WITH LASER Left 12/9/2019    Procedure: EYE CAPSULOTOMY WITH LASER LEFT;  Surgeon: Natalie Cao MD;  Location: [facility] OR;  Service: Ophthalmology   • EYE SURGERY      cataract surgery both eyes   • INTERVENTIONAL RADIOLOGY PROCEDURE Bilateral 12/31/2019    Procedure: Carotid Cerebral Angiogram;  Surgeon: Damian Dubois MD;  Location: [facility] CATH INVASIVE LOCATION;  Service: Interventional Radiology   • KNEE ARTHROSCOPY Bilateral    • KNEE ARTHROSCOPY Right 2010    Dr Lewis   • KNEE ARTHROSCOPY Left 2008    Dr Jameson   • MOUTH SURGERY      Oral extractions   • NEUROPLASTY / TRANSPOSITION ULNAR NERVE AT ELBOW Left    • OTHER SURGICAL HISTORY      Foraminotomy and discectomy   • PERICARDIAL WINDOW N/A 8/25/2017    Procedure: PERICARDIAL WINDOW;  Surgeon: Freeman Phillips MD;  Location:  [facility] OR;  Service:        Family History   Problem Relation Age of Onset    • Hypertension Mother    • Arthritis Mother    • Alcohol abuse Father    • Heart disease Other    • Stroke Other    • Hypertension Other    • Other Other         Neurologic disorder   • Parkinsonism Other    • Stroke Other    • Heart disease Other    • Hypertension Other    • Heart disease Other    • Stroke Other    • Hypertension Other        Social History     Socioeconomic History   • Marital status:      Spouse name: Not on file   • Number of children: 1   • Years of education: Not on file   • Highest education level: Not on file   Occupational History   • Occupation: [employer]     Employer: DISABLED     Comment: Disabled since -Back Problems   Tobacco Use   • Smoking status: Former Smoker     Packs/day: 1.00     Years: 10.00     Pack years: 10.00     Types: Cigarettes     Last attempt to quit: 1998     Years since quittin.6   • Smokeless tobacco: Former User     Types: Snuff     Quit date:    Substance and Sexual Activity   • Alcohol use: Yes     Frequency: Monthly or less     Drinks per session: 3 or 4     Comment: 3 PER YEAR   • Drug use: No   • Sexual activity: Defer   Social History Narrative    Caffeine use: 0-1 serving daily.    Patient lives at home with wife.            Objective   Physical Exam   Constitutional: He is oriented to person, place, and time. He appears well-developed and well-nourished. No distress.   HENT:   Head: Normocephalic and atraumatic.   Right Ear: External ear normal.   Left Ear: External ear normal.   Nose: Nose normal.   Mouth/Throat: Oropharynx is clear and moist.   Eyes: Pupils are equal, round, and reactive to light. Conjunctivae and EOM are normal.   Neck: Normal range of motion. Neck supple.   Cardiovascular: Normal rate, regular rhythm, normal heart sounds and intact distal pulses.   Pulmonary/Chest: Effort normal and breath sounds normal. No respiratory distress.   Abdominal: Soft. Bowel sounds are normal. He exhibits no  distension. There is no tenderness. There is no rebound and no guarding.   Musculoskeletal: Normal range of motion. He exhibits no edema, tenderness or deformity.   Neurological: He is alert and oriented to person, place, and time.   Skin: Skin is warm and dry. No rash noted.   Psychiatric: He has a normal mood and affect. His behavior is normal.   Nursing note and vitals reviewed.      Procedures           ED Course                                           MDM  Number of Diagnoses or Management Options  Acute renal failure, unspecified acute renal failure type (CMS/HCC):   Hypotension, unspecified hypotension type:   Lactic acidosis:   Lightheadedness:   Diagnosis management comments: 60-year-old male with lightheadedness, hypotension.  Well-developed, well-nourished obese man in no distress with exam as above.  His blood pressure is on the low side, his heart rate is normal though.  Will obtain basic labs, EKG.  Will give IV fluids.  Disposition pending.    DDX: hypotension, dehydration, electrolyte abnormality, medication reaction    EKG interpreted by me: Sinus rhythm, normal rate, right axis, no acute ST/T changes, this is a borderline EKG    Lab work notable for mild lactic acidosis, acute renal failure.  Blood pressure has improved after IV fluids.  Discussed with Dr. Abad for admission.       Amount and/or Complexity of Data Reviewed  Decide to obtain previous medical records or to obtain history from someone other than the patient: yes        Final diagnoses:   Lightheadedness   Hypotension, unspecified hypotension type   Acute renal failure, unspecified acute renal failure type (CMS/HCC)   Lactic acidosis            Adonay Pabon MD  08/17/20 6412

## 2020-08-18 ENCOUNTER — APPOINTMENT (OUTPATIENT)
Dept: GENERAL RADIOLOGY | Facility: HOSPITAL | Age: 60
End: 2020-08-18

## 2020-08-18 ENCOUNTER — APPOINTMENT (OUTPATIENT)
Dept: ULTRASOUND IMAGING | Facility: HOSPITAL | Age: 60
End: 2020-08-18

## 2020-08-18 LAB
ANION GAP SERPL CALCULATED.3IONS-SCNC: 10.9 MMOL/L (ref 5–15)
BASOPHILS # BLD AUTO: 0.04 10*3/MM3 (ref 0–0.2)
BASOPHILS NFR BLD AUTO: 0.7 % (ref 0–1.5)
BUN SERPL-MCNC: 13 MG/DL (ref 6–20)
BUN/CREAT SERPL: 12.1 (ref 7–25)
CALCIUM SPEC-SCNC: 9.7 MG/DL (ref 8.6–10.5)
CHLORIDE SERPL-SCNC: 101 MMOL/L (ref 98–107)
CO2 SERPL-SCNC: 26.1 MMOL/L (ref 22–29)
CREAT SERPL-MCNC: 1.07 MG/DL (ref 0.76–1.27)
DEPRECATED RDW RBC AUTO: 43.8 FL (ref 37–54)
EOSINOPHIL # BLD AUTO: 0.18 10*3/MM3 (ref 0–0.4)
EOSINOPHIL NFR BLD AUTO: 3.3 % (ref 0.3–6.2)
ERYTHROCYTE [DISTWIDTH] IN BLOOD BY AUTOMATED COUNT: 15 % (ref 12.3–15.4)
GFR SERPL CREATININE-BSD FRML MDRD: 71 ML/MIN/1.73
GLUCOSE BLDC GLUCOMTR-MCNC: 211 MG/DL (ref 70–130)
GLUCOSE BLDC GLUCOMTR-MCNC: 308 MG/DL (ref 70–130)
GLUCOSE BLDC GLUCOMTR-MCNC: 314 MG/DL (ref 70–130)
GLUCOSE SERPL-MCNC: 246 MG/DL (ref 65–99)
HCT VFR BLD AUTO: 34.7 % (ref 37.5–51)
HGB BLD-MCNC: 11.3 G/DL (ref 13–17.7)
IMM GRANULOCYTES # BLD AUTO: 0.06 10*3/MM3 (ref 0–0.05)
IMM GRANULOCYTES NFR BLD AUTO: 1.1 % (ref 0–0.5)
LYMPHOCYTES # BLD AUTO: 1.27 10*3/MM3 (ref 0.7–3.1)
LYMPHOCYTES NFR BLD AUTO: 23.4 % (ref 19.6–45.3)
MCH RBC QN AUTO: 26.1 PG (ref 26.6–33)
MCHC RBC AUTO-ENTMCNC: 32.6 G/DL (ref 31.5–35.7)
MCV RBC AUTO: 80.1 FL (ref 79–97)
MONOCYTES # BLD AUTO: 0.24 10*3/MM3 (ref 0.1–0.9)
MONOCYTES NFR BLD AUTO: 4.4 % (ref 5–12)
NEUTROPHILS NFR BLD AUTO: 3.64 10*3/MM3 (ref 1.7–7)
NEUTROPHILS NFR BLD AUTO: 67.1 % (ref 42.7–76)
NRBC BLD AUTO-RTO: 0 /100 WBC (ref 0–0.2)
PLATELET # BLD AUTO: 166 10*3/MM3 (ref 140–450)
PMV BLD AUTO: 10.6 FL (ref 6–12)
POTASSIUM SERPL-SCNC: 4.7 MMOL/L (ref 3.5–5.2)
RBC # BLD AUTO: 4.33 10*6/MM3 (ref 4.14–5.8)
SODIUM SERPL-SCNC: 138 MMOL/L (ref 136–145)
WBC # BLD AUTO: 5.43 10*3/MM3 (ref 3.4–10.8)

## 2020-08-18 PROCEDURE — 94799 UNLISTED PULMONARY SVC/PX: CPT

## 2020-08-18 PROCEDURE — 63710000001 INSULIN DETEMIR PER 5 UNITS: Performed by: NURSE PRACTITIONER

## 2020-08-18 PROCEDURE — 82962 GLUCOSE BLOOD TEST: CPT

## 2020-08-18 PROCEDURE — 96361 HYDRATE IV INFUSION ADD-ON: CPT

## 2020-08-18 PROCEDURE — 63710000001 INSULIN ASPART PER 5 UNITS: Performed by: NURSE PRACTITIONER

## 2020-08-18 PROCEDURE — 99226 PR SBSQ OBSERVATION CARE/DAY 35 MINUTES: CPT | Performed by: NURSE PRACTITIONER

## 2020-08-18 PROCEDURE — 85025 COMPLETE CBC W/AUTO DIFF WBC: CPT | Performed by: INTERNAL MEDICINE

## 2020-08-18 PROCEDURE — 80048 BASIC METABOLIC PNL TOTAL CA: CPT | Performed by: INTERNAL MEDICINE

## 2020-08-18 PROCEDURE — 25010000002 ENOXAPARIN PER 10 MG: Performed by: NURSE PRACTITIONER

## 2020-08-18 PROCEDURE — 71045 X-RAY EXAM CHEST 1 VIEW: CPT

## 2020-08-18 PROCEDURE — G0378 HOSPITAL OBSERVATION PER HR: HCPCS

## 2020-08-18 RX ADMIN — BUDESONIDE 0.25 MG: 0.5 INHALANT RESPIRATORY (INHALATION) at 18:40

## 2020-08-18 RX ADMIN — GABAPENTIN 600 MG: 300 CAPSULE ORAL at 15:33

## 2020-08-18 RX ADMIN — ATORVASTATIN CALCIUM 80 MG: 80 TABLET, FILM COATED ORAL at 21:14

## 2020-08-18 RX ADMIN — IPRATROPIUM BROMIDE AND ALBUTEROL SULFATE 3 ML: .5; 3 SOLUTION RESPIRATORY (INHALATION) at 10:49

## 2020-08-18 RX ADMIN — METOPROLOL TARTRATE 25 MG: 25 TABLET, FILM COATED ORAL at 21:14

## 2020-08-18 RX ADMIN — ENOXAPARIN SODIUM 40 MG: 40 INJECTION SUBCUTANEOUS at 17:36

## 2020-08-18 RX ADMIN — MONTELUKAST 10 MG: 10 TABLET, FILM COATED ORAL at 21:14

## 2020-08-18 RX ADMIN — PANTOPRAZOLE SODIUM 40 MG: 40 TABLET, DELAYED RELEASE ORAL at 06:49

## 2020-08-18 RX ADMIN — INSULIN ASPART 10 UNITS: 100 INJECTION, SOLUTION INTRAVENOUS; SUBCUTANEOUS at 12:02

## 2020-08-18 RX ADMIN — INSULIN DETEMIR 60 UNITS: 100 INJECTION, SOLUTION SUBCUTANEOUS at 14:54

## 2020-08-18 RX ADMIN — AMITRIPTYLINE HYDROCHLORIDE 25 MG: 25 TABLET, FILM COATED ORAL at 21:14

## 2020-08-18 RX ADMIN — BUDESONIDE 0.25 MG: 0.5 INHALANT RESPIRATORY (INHALATION) at 07:11

## 2020-08-18 RX ADMIN — VORTIOXETINE 20 MG: 20 TABLET, FILM COATED ORAL at 08:24

## 2020-08-18 RX ADMIN — INSULIN ASPART 10 UNITS: 100 INJECTION, SOLUTION INTRAVENOUS; SUBCUTANEOUS at 08:25

## 2020-08-18 RX ADMIN — SODIUM CHLORIDE, PRESERVATIVE FREE 10 ML: 5 INJECTION INTRAVENOUS at 08:26

## 2020-08-18 RX ADMIN — IPRATROPIUM BROMIDE AND ALBUTEROL SULFATE 3 ML: .5; 3 SOLUTION RESPIRATORY (INHALATION) at 15:24

## 2020-08-18 RX ADMIN — METOPROLOL TARTRATE 25 MG: 25 TABLET, FILM COATED ORAL at 12:02

## 2020-08-18 RX ADMIN — CLOPIDOGREL BISULFATE 75 MG: 75 TABLET ORAL at 08:25

## 2020-08-18 RX ADMIN — IPRATROPIUM BROMIDE AND ALBUTEROL SULFATE 3 ML: .5; 3 SOLUTION RESPIRATORY (INHALATION) at 07:05

## 2020-08-18 RX ADMIN — INSULIN ASPART 7 UNITS: 100 INJECTION, SOLUTION INTRAVENOUS; SUBCUTANEOUS at 17:32

## 2020-08-18 RX ADMIN — INSULIN ASPART 10 UNITS: 100 INJECTION, SOLUTION INTRAVENOUS; SUBCUTANEOUS at 17:33

## 2020-08-18 RX ADMIN — IPRATROPIUM BROMIDE AND ALBUTEROL SULFATE 3 ML: .5; 3 SOLUTION RESPIRATORY (INHALATION) at 18:41

## 2020-08-18 RX ADMIN — SODIUM CHLORIDE, PRESERVATIVE FREE 10 ML: 5 INJECTION INTRAVENOUS at 21:15

## 2020-08-18 RX ADMIN — GABAPENTIN 600 MG: 300 CAPSULE ORAL at 06:49

## 2020-08-18 RX ADMIN — GABAPENTIN 600 MG: 300 CAPSULE ORAL at 21:14

## 2020-08-18 RX ADMIN — ASPIRIN 81 MG: 81 TABLET, COATED ORAL at 08:25

## 2020-08-18 RX ADMIN — INSULIN ASPART 4 UNITS: 100 INJECTION, SOLUTION INTRAVENOUS; SUBCUTANEOUS at 06:49

## 2020-08-18 RX ADMIN — CETIRIZINE HYDROCHLORIDE 10 MG: 10 TABLET, FILM COATED ORAL at 08:25

## 2020-08-18 RX ADMIN — INSULIN ASPART 7 UNITS: 100 INJECTION, SOLUTION INTRAVENOUS; SUBCUTANEOUS at 12:01

## 2020-08-18 NOTE — PLAN OF CARE
Problem: Patient Care Overview  Goal: Plan of Care Review  Outcome: Ongoing (interventions implemented as appropriate)  Flowsheets (Taken 8/18/2020 1721)  Progress: improving  Plan of Care Reviewed With: patient     Problem: Patient Care Overview  Goal: Individualization and Mutuality  Outcome: Ongoing (interventions implemented as appropriate)     Problem: Fall Risk (Adult)  Goal: Identify Related Risk Factors and Signs and Symptoms  Outcome: Ongoing (interventions implemented as appropriate)     Problem: Fall Risk (Adult)  Goal: Absence of Fall  Outcome: Ongoing (interventions implemented as appropriate)

## 2020-08-18 NOTE — PROGRESS NOTES
PROGRESS NOTE        Date of Admission: 8/16/2020  Length of Stay: 0  Primary Care Physician: Isaura Godoy MD    Subjective   Chief Complaint: Follow up dizziness and orthostasis  HPI: This is a 60-year-old male with past medical history significant for multiple medical problems who was admitted from the emergency room on 8/17/2020 as he presented for dizziness and orthostasis.  Patient had recently been discharged from [facility] on 8/5/2020 where at that time he was admitted for presyncope and hypotension.  They felt that his presyncope was likely secondary to excessive blood pressure medications causing hypotension.  CT perfusion study showed no large vessel occlusion, MRI of the brain was negative for ischemia, EEG had no evidence of seizure activity.  His symptoms did resolve during that admission and he was discharged home.  During his previous admission at [facility] in [place] had his blood pressure medicines and diuretics were held.  They were gradually re-added and orthostasis were checked prior to discharge.  He was continued on Bumex lisinopril and Aldactone at discharge but his Imdur was held due to hypotension and orthostatic blood pressures.  His metoprolol was decreased from 50 mg twice daily to 25 mg twice daily.    Upon further evaluation through his previous records he has been evaluated by several neurologist for similar complaints of dizziness, orthostasis and presyncope.  Upon admission he did have some acute renal failure which has resolved with IV fluid resuscitation.  His diuretics have been held.  He was doing better this morning however later in the morning he started having some dizziness when he got up to go to the restroom.  He did become mildly tachycardic but that did resolve relatively quickly once he was sitting down.  His metoprolol has been restarted.  I have asked cardiology to evaluate.  He does have a history of prior ischemic stroke as well as a prior history  of migraines.           Review Of Systems:   Review of Systems   General ROS: Patient denies any fevers, chills or loss of consciousness.    Respiratory ROS: Denies cough or shortness of breath.   Cardiovascular ROS: Denies chest pain or palpitations. No history of exertional chest pain.   Gastrointestinal ROS: Denies nausea and vomiting. Denies any abdominal pain. No diarrhea.  Genitourinary ROS: Denies dysuria or hematuria.  Musculoskeletal ROS: Denies back pain. No muscle pain. No calf pain.   Neurological ROS: Denies any focal weakness. No loss of consciousness. Denies any numbness. Denies neck pain.  Dizziness  Dermatological ROS: Denies any redness or pruritis.    Objective      Temp:  [97.6 °F (36.4 °C)-98.6 °F (37 °C)] 98.1 °F (36.7 °C)  Heart Rate:  [] 89  Resp:  [16-20] 20  BP: (135-172)/(48-77) 165/76  Physical Exam    General Appearance:  Alert and cooperative, not in any acute distress.   Head:  Atraumatic and normocephalic, without obvious abnormality.   Eyes:          PERRLA, conjunctivae and sclerae normal, no Icterus. No pallor. Extraocular movements are within normal limits.   Ears:  Ears appear intact with no abnormalities noted.   Throat: No oral lesions, no thrush, oral mucosa moist.   Neck: Supple, trachea midline, no thyromegaly, no carotid bruit.       Lungs:   Chest shape is normal. Breath sounds heard bilaterally equally.  No crackles or wheezing. No pleural rub or bronchial breathing.   Heart:  Normal S1 and S2, no murmur, no gallop, no rub. No JVD   Abdomen:   Normal bowel sounds, no masses, no organomegaly. Soft    nontender, nondistended, no guarding, no rebound             Tenderness, obese   Extremities: Moves all extremities well, no edema, no cyanosis, no clubbing.   Pulses: Pulses palpable and equal bilaterally   Skin: No bleeding, bruising or rash       Neurologic:    Psychiatric/Behavior:     Cranial nerves 2 - 12 grossly intact, sensation intact, Motor power is normal and  equal bilaterally.  Mood normal, behavior normal       Results Review:    I have reviewed the labs, radiology results and diagnostic studies.    Results from last 7 days   Lab Units 08/18/20  0612   WBC 10*3/mm3 5.43   HEMOGLOBIN g/dL 11.3*   PLATELETS 10*3/mm3 166     Results from last 7 days   Lab Units 08/18/20  0612   SODIUM mmol/L 138   POTASSIUM mmol/L 4.7   CO2 mmol/L 26.1   CREATININE mg/dL 1.07   GLUCOSE mg/dL 246*       Culture Data: No results found for: BLOODCX, URINECX, WOUNDCX, MRSACX, RESPCX, STOOLCX  Radiology Data:   Cardiology Data:    I have reviewed the medications.    Assessment/Plan     Assessment/Plan:  1.  Orthostasis with pre syncope-this is likely secondary to his medications.  The diuretics as well as ACE inhibitor have been held.  He has had neurologic work-up including MRI and EEG which were negative.  CT perfusion study done on 8/3/2020 showed symmetric blood volume, relative flow and mean transit time with no evidence of infarct or tissue.  CT angios of the head/neck showed minimal cervical carotid and intracranial atherosclerotic disease and no significant narrowing no large vessel occlusion or high-grade stenosis or aneurysmal dilation.  EEG showed no evidence of seizure and MRI of the head was negative.  I did review MRI radiology report and he did have some left frontal sinus mucosal disease that they felt could be correlated with any clinical signs of sinusitis.  He had no other evidence it appears of a sinusitis.  He does use nasal steroids.\    I spoke with cardiology on call at [facility] and discussed case.  He has seen numerous neurologist in past and it is recommended that he follow up as an outpatient but neurology feels that this is likely BP medication related    I have asked cardiology to evaluate.  I have reviewed tele and do not see any significant arrhythmia.  He did get tachycardic with exertion but this was off his beta blocker and HR did improve once he was at rest.    2.   Acute renal [id number] secondary to diuretics.  We will continue to hold nephrotoxic medications    3.  Diabetes mellitus type 2-patient is on insulin protocol.  He has been placed on Levemir as well as scheduled NovoLog before meals.  We will continue to monitor blood sugars and titrate accordingly    4.  CVA/TIAs- Medical management.    5.  COPD/asthma with no evidence of exacerbation    6.  Coronary artery disease status post CABG three-vessel-medical management    7.  Migraines         DVT prophylaxis:  lovenox  Discharge Planning:   Ashley Waterman, ANTONIO 08/18/20 16:24

## 2020-08-18 NOTE — PLAN OF CARE
Problem: Patient Care Overview  Goal: Plan of Care Review  Outcome: Ongoing (interventions implemented as appropriate)  Flowsheets (Taken 8/18/2020 8628)  Progress: improving  Plan of Care Reviewed With: patient  Note:   VSS.  Pt rested well during shift on home BIPAP.  No changes to pt condition to report during shift.  Will continue to monitor.

## 2020-08-19 ENCOUNTER — READMISSION MANAGEMENT (OUTPATIENT)
Dept: CALL CENTER | Facility: HOSPITAL | Age: 60
End: 2020-08-19

## 2020-08-19 ENCOUNTER — HOSPITAL ENCOUNTER (OUTPATIENT)
Dept: ULTRASOUND IMAGING | Facility: HOSPITAL | Age: 60
End: 2020-08-19

## 2020-08-19 VITALS
HEIGHT: 70 IN | DIASTOLIC BLOOD PRESSURE: 69 MMHG | RESPIRATION RATE: 19 BRPM | TEMPERATURE: 98.6 F | SYSTOLIC BLOOD PRESSURE: 172 MMHG | HEART RATE: 104 BPM | OXYGEN SATURATION: 97 % | BODY MASS INDEX: 43.24 KG/M2 | WEIGHT: 302.03 LBS

## 2020-08-19 LAB
ANION GAP SERPL CALCULATED.3IONS-SCNC: 13.2 MMOL/L (ref 5–15)
BASOPHILS # BLD AUTO: 0.05 10*3/MM3 (ref 0–0.2)
BASOPHILS NFR BLD AUTO: 1 % (ref 0–1.5)
BUN SERPL-MCNC: 12 MG/DL (ref 6–20)
BUN/CREAT SERPL: 12 (ref 7–25)
CALCIUM SPEC-SCNC: 9.7 MG/DL (ref 8.6–10.5)
CHLORIDE SERPL-SCNC: 98 MMOL/L (ref 98–107)
CO2 SERPL-SCNC: 27.8 MMOL/L (ref 22–29)
CREAT SERPL-MCNC: 1 MG/DL (ref 0.76–1.27)
DEPRECATED RDW RBC AUTO: 45.2 FL (ref 37–54)
EOSINOPHIL # BLD AUTO: 0.16 10*3/MM3 (ref 0–0.4)
EOSINOPHIL NFR BLD AUTO: 3.1 % (ref 0.3–6.2)
ERYTHROCYTE [DISTWIDTH] IN BLOOD BY AUTOMATED COUNT: 15.1 % (ref 12.3–15.4)
GFR SERPL CREATININE-BSD FRML MDRD: 76 ML/MIN/1.73
GLUCOSE BLDC GLUCOMTR-MCNC: 272 MG/DL (ref 70–130)
GLUCOSE BLDC GLUCOMTR-MCNC: 321 MG/DL (ref 70–130)
GLUCOSE BLDC GLUCOMTR-MCNC: 359 MG/DL (ref 70–130)
GLUCOSE SERPL-MCNC: 317 MG/DL (ref 65–99)
HCT VFR BLD AUTO: 35.2 % (ref 37.5–51)
HGB BLD-MCNC: 11.6 G/DL (ref 13–17.7)
IMM GRANULOCYTES # BLD AUTO: 0.07 10*3/MM3 (ref 0–0.05)
IMM GRANULOCYTES NFR BLD AUTO: 1.4 % (ref 0–0.5)
LYMPHOCYTES # BLD AUTO: 1.26 10*3/MM3 (ref 0.7–3.1)
LYMPHOCYTES NFR BLD AUTO: 24.3 % (ref 19.6–45.3)
MCH RBC QN AUTO: 27 PG (ref 26.6–33)
MCHC RBC AUTO-ENTMCNC: 33 G/DL (ref 31.5–35.7)
MCV RBC AUTO: 82.1 FL (ref 79–97)
MONOCYTES # BLD AUTO: 0.28 10*3/MM3 (ref 0.1–0.9)
MONOCYTES NFR BLD AUTO: 5.4 % (ref 5–12)
NEUTROPHILS NFR BLD AUTO: 3.36 10*3/MM3 (ref 1.7–7)
NEUTROPHILS NFR BLD AUTO: 64.8 % (ref 42.7–76)
NRBC BLD AUTO-RTO: 0 /100 WBC (ref 0–0.2)
PLATELET # BLD AUTO: 172 10*3/MM3 (ref 140–450)
PMV BLD AUTO: 10.3 FL (ref 6–12)
POTASSIUM SERPL-SCNC: 4.5 MMOL/L (ref 3.5–5.2)
RBC # BLD AUTO: 4.29 10*6/MM3 (ref 4.14–5.8)
SODIUM SERPL-SCNC: 139 MMOL/L (ref 136–145)
WBC # BLD AUTO: 5.18 10*3/MM3 (ref 3.4–10.8)

## 2020-08-19 PROCEDURE — 85025 COMPLETE CBC W/AUTO DIFF WBC: CPT | Performed by: INTERNAL MEDICINE

## 2020-08-19 PROCEDURE — 94799 UNLISTED PULMONARY SVC/PX: CPT

## 2020-08-19 PROCEDURE — 80048 BASIC METABOLIC PNL TOTAL CA: CPT | Performed by: INTERNAL MEDICINE

## 2020-08-19 PROCEDURE — 63710000001 INSULIN ASPART PER 5 UNITS: Performed by: NURSE PRACTITIONER

## 2020-08-19 PROCEDURE — 63710000001 INSULIN DETEMIR PER 5 UNITS: Performed by: NURSE PRACTITIONER

## 2020-08-19 PROCEDURE — 99217 PR OBSERVATION CARE DISCHARGE MANAGEMENT: CPT | Performed by: NURSE PRACTITIONER

## 2020-08-19 PROCEDURE — G0378 HOSPITAL OBSERVATION PER HR: HCPCS

## 2020-08-19 PROCEDURE — 82962 GLUCOSE BLOOD TEST: CPT

## 2020-08-19 PROCEDURE — 99214 OFFICE O/P EST MOD 30 MIN: CPT | Performed by: INTERNAL MEDICINE

## 2020-08-19 PROCEDURE — 25010000002 ENOXAPARIN PER 10 MG: Performed by: NURSE PRACTITIONER

## 2020-08-19 RX ORDER — CLONIDINE HYDROCHLORIDE 0.1 MG/1
0.1 TABLET ORAL TAKE AS DIRECTED
Qty: 120 TABLET | Refills: 0 | Status: SHIPPED | OUTPATIENT
Start: 2020-08-19 | End: 2021-06-14

## 2020-08-19 RX ORDER — AMLODIPINE BESYLATE 5 MG/1
5 TABLET ORAL
Status: DISCONTINUED | OUTPATIENT
Start: 2020-08-19 | End: 2020-08-19

## 2020-08-19 RX ADMIN — INSULIN ASPART 8 UNITS: 100 INJECTION, SOLUTION INTRAVENOUS; SUBCUTANEOUS at 11:34

## 2020-08-19 RX ADMIN — CETIRIZINE HYDROCHLORIDE 10 MG: 10 TABLET, FILM COATED ORAL at 08:41

## 2020-08-19 RX ADMIN — ASPIRIN 81 MG: 81 TABLET, COATED ORAL at 08:40

## 2020-08-19 RX ADMIN — IPRATROPIUM BROMIDE AND ALBUTEROL SULFATE 3 ML: .5; 3 SOLUTION RESPIRATORY (INHALATION) at 10:43

## 2020-08-19 RX ADMIN — VORTIOXETINE 20 MG: 20 TABLET, FILM COATED ORAL at 08:43

## 2020-08-19 RX ADMIN — METOPROLOL TARTRATE 25 MG: 25 TABLET, FILM COATED ORAL at 08:40

## 2020-08-19 RX ADMIN — AMLODIPINE BESYLATE 5 MG: 5 TABLET ORAL at 13:49

## 2020-08-19 RX ADMIN — ENOXAPARIN SODIUM 40 MG: 40 INJECTION SUBCUTANEOUS at 06:34

## 2020-08-19 RX ADMIN — GABAPENTIN 600 MG: 300 CAPSULE ORAL at 13:49

## 2020-08-19 RX ADMIN — INSULIN ASPART 10 UNITS: 100 INJECTION, SOLUTION INTRAVENOUS; SUBCUTANEOUS at 08:41

## 2020-08-19 RX ADMIN — INSULIN ASPART 10 UNITS: 100 INJECTION, SOLUTION INTRAVENOUS; SUBCUTANEOUS at 11:37

## 2020-08-19 RX ADMIN — BUDESONIDE 0.25 MG: 0.5 INHALANT RESPIRATORY (INHALATION) at 06:26

## 2020-08-19 RX ADMIN — CLOPIDOGREL BISULFATE 75 MG: 75 TABLET ORAL at 08:40

## 2020-08-19 RX ADMIN — INSULIN DETEMIR 40 UNITS: 100 INJECTION, SOLUTION SUBCUTANEOUS at 08:40

## 2020-08-19 RX ADMIN — GABAPENTIN 600 MG: 300 CAPSULE ORAL at 06:34

## 2020-08-19 RX ADMIN — PANTOPRAZOLE SODIUM 40 MG: 40 TABLET, DELAYED RELEASE ORAL at 06:34

## 2020-08-19 RX ADMIN — IPRATROPIUM BROMIDE AND ALBUTEROL SULFATE 3 ML: .5; 3 SOLUTION RESPIRATORY (INHALATION) at 06:26

## 2020-08-19 RX ADMIN — SODIUM CHLORIDE, PRESERVATIVE FREE 10 ML: 5 INJECTION INTRAVENOUS at 08:41

## 2020-08-19 RX ADMIN — INSULIN ASPART 6 UNITS: 100 INJECTION, SOLUTION INTRAVENOUS; SUBCUTANEOUS at 06:34

## 2020-08-19 NOTE — DISCHARGE INSTRUCTIONS
Cardiology recommendations:    1.  Stop Spironolactone, Bumex and Lisinopril.  2.  Taper Metoprolol as follows:  3.  Metoprolol 25 mg -  Take one-half (1/2) tab twice daily X 1 week then take one-half (1/2) tablet daily X 1 week        4.  Start Clonidine 0.1 mg every 6 hours as needed for systolic blood pressure > 170 (top number) or diastolic BP (bottom number) > 100.  5.  Jobst stockings he will have to have them custom fitted at the medical supply warehouse here in [place].    6.  Increase his fluid intake by gauging his urine output and color.    7.  Urinate every  minutes.  If he goes longer than 2 hours without urination he will need to drink a full 32 ounces of water.    8.  supplement his water intake with an electrolyte solution such as Gatorade, Powerade, smart water, etc.  He is instructed to drink his water to electrolyte solution and a 4:1 ratio.    9.  Avoid beverages that have diuretic properties such as alcoholic beverages and caffeine-containing beverages.    10.  Diet exercise and weight management.  The patient has been educated that compression stockings is the most effective therapy for treating his condition.    11. Increase his dietary sodium intake.  He has been counseled that this will result in increasing blood pressure and swelling of his feet and ankles as well as his hands and face.    12.  No driving or operating motor vehicles.    13.  Avoid changing posture rapidly.  He has been counseled and educated regarding self protection measures.  Specifically he has been advised that if he develops severe dizziness with presyncope on changing posture to immediately lie back down and elevate his legs.    14.  Contact Dr. Kim's office before starting any Blood pressure medications that may be initiated by primary care providers or other physicians who were unaware of the profound degree of his orthostatic hypotension.

## 2020-08-19 NOTE — DISCHARGE SUMMARY
[facility] HOSPITALIST   DISCHARGE SUMMARY    Name:  Denzel Harding   Age:  60 y.o.  Sex:  male  :  1960  MRN:  [id number]   Visit Number:  [id number]  Primary Care Physician:  Isaura Godoy MD  Date of Discharge:  2020  Admission Date:  2020        Important issues/recommendations to note:  Cardiology recommendations:    1.  Stop Spironolactone and Bumex   2.  Stop Lisinopril.  3.  Taper Metoprolol as follows:       Metoprolol 25 mg -  Take one-half (1/2) tab twice daily X 1 week then take one-half (1/2) tablet daily X 1 week        4.  Start Clonidine 0.1 mg every 6 hours as needed for systolic blood pressure > 170 (top number) or diastolic BP (bottom number) > 100.  5.  Jobst stockings he will have to have them custom fitted at the medical supply warehouse here in [place].    6.  Increase his fluid intake by gauging his urine output and color.    7.  Urinate every  minutes.  If he goes longer than 2 hours without urination he will need to drink a full 32 ounces of water.    8.  supplement his water intake with an electrolyte solution such as Gatorade, Powerade, smart water, etc.  He is instructed to drink his water to electrolyte solution and a 4:1 ratio.    9.  Avoid beverages that have diuretic properties such as alcoholic beverages and caffeine-containing beverages.    10.  Diet exercise and weight management.  The patient has been educated that compression stockings is the most effective therapy for treating his condition.    11. Increase his dietary sodium intake.  He has been counseled that this will result in increasing blood pressure and swelling of his feet and ankles as well as his hands and face.    12.  No driving or operating motor vehicles.    13.  Avoid changing posture rapidly.  He has been counseled and educated regarding self protection measures.  Specifically he has been advised that if he develops severe dizziness with presyncope on changing posture  to immediately lie back down and elevate his legs.    14.  Contact Dr. Kim's office before starting any Blood pressure medications that may be initiated by primary care providers or other physicians who were unaware of the profound degree of his orthostatic hypotension.    Follow ups needed:    Lynnette Gamble PA-C   Physician Assistant  Neurology [phone] [address]  [address]  [facility] [address]     Next Steps: Follow up in 2 week(s)      Instructions: Dizziness pre syncope      Dallas Kim MD   Cardiology  Interventional Radiology [phone] [address]  [address]  [facility] [address]     Next Steps: Follow up in 2 week(s)      Isaura Godoy MD  PCP - General Internal Medicine  Geriatric Medicine [phone] [address]  [address]  [facility] [address]     Next Steps: Follow up in 1 week(s)                          Presenting Problem:    Orthostatic hypotension [I95.1]       Discharge Diagnosis:       Orthostatic hypotension    Coronary arteriosclerosis in native artery    Diabetic polyneuropathy associated with type 2 diabetes mellitus (CMS/[facility])    Seizure disorder (CMS/[facility])    History of CVA (cerebrovascular accident)    Obstructive sleep apnea syndrome    Chronic diastolic CHF (congestive heart failure) (CMS/[facility])    COPD (chronic obstructive pulmonary disease) (CMS/[facility])    JUAN (acute kidney injury) (CMS/[facility])        Past Medical History:  Past Medical History:   Diagnosis Date   • Anxiety    • Arthritis    • Asthma    • Brachial neuritis 1/19/2017   • Cellulitis     left arm and right leg    • Chest pain    • CHF (congestive heart failure) (CMS/[facility])     Patient reported history May 2020    • COPD (chronic obstructive pulmonary disease) (CMS/[facility])    • Coronary artery disease     Patient reported CABG , 3 vessel   • Depression    • Diabetes mellitus (CMS/[facility])     diagnosed in 1998, checks fsbg 3-4x/day   • Dizziness    •  Edema    • Elevated cholesterol    • GERD (gastroesophageal reflux disease)    • H/O chest x-ray 07/04/2015    Mild Left base atelectasis   • H/O echocardiogram 07/05/2015    Normal LVSF. EF of 60-65%. Grade 1 diastolic dysfunction of the LV myocardium. No evidence of pericardial effusion   • H/O exercise stress test    • Hearing loss     No use of hearing aids   • History of fracture     Reported 2 bones in left foot and a finger   • History of herniated intervertebral disc     History of left L5-S1 disc herniation   • History of pneumonia    • History of pneumonia    • Hypertension    • Impaired functional mobility, balance, gait, and endurance    • LOM (loss of memory) 1/19/2017   • Lower back pain     Right   • Measles    • Neuropathy     feet    • EDD treated with BiPAP     BiPAP HS - instructed to bring mask/machine DOS (setting 13 and 5)   • Palpitations    • Pericardial effusion    • Poor dentition     Patient reported missing multiple teeth   • Renal disorder    • Seizure disorder (CMS/HCC)    • Seizures (CMS/HCC)     FOCAL last 2009   • Shortness of breath 1/19/2017   • SOB (shortness of breath)    • Staph infection     back after sugery at the incision site    • Stroke (CMS/HCC)     x2 most recent , slight weakness in hands occasionaly    • Wears glasses          Consults:     Consults     Date and Time Order Name Status Description    8/18/2020 1631 Inpatient Cardiology Consult Completed           Procedures Performed:  None             History of presenting illness/Hospital Course:    This is a 60-year-old male with past medical history significant for multiple medical problems including diabetes mellitus, coronary artery disease status post CABG, congestive heart failure, diastolic, neuropathy, obstructive sleep apnea, depression, hypertension, COPD, asthma who was admitted from the emergency room on 8/17/2020 as he presented for dizziness and orthostasis.  Patient had recently been discharged  from [facility] on 8/5/2020 where at that time he was admitted for presyncope and hypotension.  They felt that his presyncope was likely secondary to excessive blood pressure medications causing hypotension.  CT perfusion study showed no large vessel occlusion, MRI of the brain was negative for ischemia, EEG had no evidence of seizure activity, CTA of head and neck showed minimal cervical carotid and intracranial atherosclerotic disease without significant associated narrowing.  There was no large vessel occlusion, high-grade stenosis or aneurysmal dilatation.  His symptoms did resolve during that admission and he was discharged home.  During his previous admission at [facility] in [place] had his blood pressure medicines and diuretics were held.  They were gradually re-added and orthostasis were checked prior to discharge.  He was continued on Bumex lisinopril and Aldactone at discharge but his Imdur and Norvasc was held due to hypotension and orthostatic blood pressures.  His metoprolol was decreased from 50 mg twice daily to 25 mg twice daily.  Upon further evaluation through his previous records he has been evaluated by several neurologist for similar complaints of dizziness, orthostasis and presyncope.  He has been followed by neurology as well.    Upon admission he did have some acute renal failure and appeared to be dehydrated which has resolved with IV fluid resuscitation.  His diuretics have been discontinued.  His blood pressure has did start to improve so his Metoprolol wad added.  Given his degree of orthostasis a cardiology consult was ordered.  Patient does have a history of noncompliance with his dietary restrictions for his diabetes mellitus and he is poorly controlled.  He has had no complete loss of consciousness and no history of arrhythmia.  Again he has had extensive neurologic evaluation for his dizziness and presyncope.  He was taken off lisinopril as well as Norvasc but was  continued on diuretic therapy and beta-blocker.  His resting blood pressures ranged from 150-175 systolically.      Cardiology Dr. Akins did see and evaluate patient and feels that his severe symptomatic orthostatic hypotension is likely secondary to autonomic failure due to his diabetic neuropathy.  He also feels that unfortunately no single medication will control his blood pressure without resulting in worsening orthostasis.  He has recommended tapering him off his beta-blocker completely over the next 2 weeks and discontinuation of all antihypertensive medications.  He recommends only using clonidine 0.1 mg orally every 6 hours as needed for systolic blood pressure greater than 170 mmHg or diastolic blood pressure greater than 100 mmHg.  He is also recommended Jobst stockings with 20 to 30 mmHg compression above the knee.  He did have a lengthy discussion with the patient regarding the need to wear the stockings.  Cardiology feels that should his symptoms worsen and not respond to the conservative approach that he is recommended then at that point possibly pharmacologic therapy such as Florinef, Northera or midodrine could be considered adding.  He does want to follow the patient back in his office in 2 to 3 weeks.  He is recommended no further inpatient testing at this time and he is cleared him for discharge home from the cardiac standpoint.  He did advise the patient that should he develop severe dizziness with presyncope on change in position that he immediately lie back down and elevate his legs.  He was also counseled extensively on counterpressure maneuvers to help with the orthostasis.  He is also recommended that any provider contact his office prior to initiating any antihypertensive therapy due to his profound degree of orthostatic hypotension.    I have had a long discussion with the patient at bedside and he has no questions at this time.  He is understood the instructions from cardiology.  We  will plan to discharge him home with the appropriate follow-ups.    Vital Signs:    Temp:  [98 °F (36.7 °C)-99.1 °F (37.3 °C)] 98.6 °F (37 °C)  Heart Rate:  [] 104  Resp:  [18-20] 19  BP: (143-172)/(58-74) 172/69    Physical Exam:  General Appearance:    Alert and cooperative, not in any acute distress.   Head:    Atraumatic and normocephalic, without obvious abnormality.   Eyes:            PERRLA, conjunctivae and sclerae normal, no Icterus. No pallor. Extra-occular movements are within normal limits.   Ears:    Ears appear intact with no abnormalities noted.   Throat:   No oral lesions, no thrush, oral mucosa moist.   Neck:   Supple, trachea midline, no thyromegaly, no carotid bruit.       Lungs:     Chest shape is normal. Breath sounds heard bilaterally equally.  No crackles or wheezing. No pleural rub or bronchial breathing.    Heart:    Normal S1 and S2, no murmur, no gallop, no rub. No JVD   Abdomen:     Normal bowel sounds, no masses, no organomegaly. Soft        nontender, nodistended, no guarding, no rebound                Tenderness, obese   Extremities:   Moves all extremities well, trace edema, no cyanosis, no             clubbing   Pulses:   Pulses palpable and equal bilaterally   Skin:   No bleeding, bruising or rash   Lymph nodes:  Psychiatric/Behavior:    No palpable adenopathy    Normal mood, normal behavior   Neurologic:   Cranial nerves 2 - 12 grossly intact, sensation intact, Motor power is normal and equal bilaterally.           Pertinent Lab Results:     I have reviewed the labs done in the emergency room.  Results from last 7 days   Lab Units 08/19/20  0634 08/18/20  0612 08/17/20  1159 08/17/20  0012   SODIUM mmol/L 139 138 135* 134*   POTASSIUM mmol/L 4.5 4.7 4.5 4.7   CHLORIDE mmol/L 98 101 99 96*   CO2 mmol/L 27.8 26.1 25.2 27.9   BUN mg/dL 12 13 17 17   CREATININE mg/dL 1.00 1.07 1.30* 1.80*   CALCIUM mg/dL 9.7 9.7 9.1 9.5   BILIRUBIN mg/dL  --   --   --  0.7   ALK PHOS U/L  --   --    --  92   ALT (SGPT) U/L  --   --   --  86*   AST (SGOT) U/L  --   --   --  65*   GLUCOSE mg/dL 317* 246* 248* 213*     Results from last 7 days   Lab Units 08/19/20  0634 08/18/20  0612 08/17/20  0012   WBC 10*3/mm3 5.18 5.43 9.92   HEMOGLOBIN g/dL 11.6* 11.3* 12.9*   HEMATOCRIT % 35.2* 34.7* 37.7   PLATELETS 10*3/mm3 172 166 218         Results from last 7 days   Lab Units 08/17/20  0012   TROPONIN T ng/mL 0.026                     Results from last 7 days   Lab Units 08/17/20  0420   COLOR UA  Yellow   GLUCOSE UA  Negative   KETONES UA  Negative   LEUKOCYTES UA  Trace*   PH, URINE  <=5.0   BILIRUBIN UA  Negative   UROBILINOGEN UA  1.0 E.U./dL     Pain Management Panel     Pain Management Panel Latest Ref Rng & Units 10/15/2019 9/14/2019    AMPHETAMINES SCREEN, URINE Negative Negative Negative    BARBITURATES SCREEN Negative Negative Negative    BENZODIAZEPINE SCREEN, URINE Negative Negative Negative    BUPRENORPHINEUR Negative Negative Negative    COCAINE SCREEN, URINE Negative Negative Negative    METHADONE SCREEN, URINE Negative Negative Negative    METHAMPHETAMINEUR Negative Negative Negative                Pertinent Radiology Results:    Imaging Results (All)     Procedure Component Value Units Date/Time    XR Chest 1 View [[id number]] Collected:  08/18/20 0908     Updated:  08/18/20 0910    Narrative:       PROCEDURE: XR CHEST 1 VW-     HISTORY: follow up; R42-Dizziness and giddiness; I95.9-Hypotension,  unspecified; N17.9-Acute kidney failure, unspecified; E87.2-Acidosis;  Z74.09-Other reduced mobility; Z78.9-Other specified health status     COMPARISON: 08/17/2020.     FINDINGS: The patient is status post median sternotomy and CABG  procedure. The heart is normal in size. The mediastinum is unremarkable.  There are low lung volumes with left basilar atelectasis. There is no  pneumothorax.  There are no acute osseous abnormalities.       Impression:       Low lung volumes with left basilar atelectasis.      Continued followup is recommended.     This report was finalized on 8/18/2020 9:08 AM by Aspen Decker M.D..    XR Chest 1 View [[id number]] Collected:  08/17/20 0810     Updated:  08/17/20 0812    Narrative:       PROCEDURE: XR CHEST 1 VW-     HISTORY: Weak/Dizzy/AMS triage protocol     COMPARISON: 07/08/2020.     FINDINGS: The patient is status post median sternotomy and CABG  procedure. The heart is normal in size. The mediastinum is unremarkable.  There are chronic interstitial lung changes. There are no focal  opacities were effusions. There is no pneumothorax.  There are no acute  osseous abnormalities.       Impression:       No acute cardiopulmonary process.     Continued followup is recommended.     This report was finalized on 8/17/2020 8:10 AM by Aspen Decker M.D..          Echo:    Results for orders placed during the hospital encounter of 08/16/20   Adult Transthoracic Echo Complete W/ Cont if Necessary Per Protocol    Narrative 1.  Technically difficult study with limited windows.  2.  Grossly normal left ventricular systolic function, LVEF approximately   55-60%.  3.  Unable to evaluate regional wall motion.  4.  Normal right ventricular systolic function by TAPSE.  5.  Moderate left atrial dilation.  6.  No significant valvular abnormalities appreciated.       Condition on Discharge:    Stable        Discharge Disposition:        Discharge Medication:       Discharge Medications      New Medications      Instructions Start Date   cloNIDine 0.1 MG tablet  Commonly known as:  Catapres   0.1 mg, Oral, Take As Directed, 1 tablet every 6 hours as needed for SBP > 170 or DBP > 100         Changes to Medications      Instructions Start Date   flunisolide 25 MCG/ACT (0.025%) solution nasal spray  Commonly known as:  NASALIDE  What changed:    · when to take this  · reasons to take this   2 sprays, Inhalation, Every 12 Hours      HumaLOG KwikPen 100 UNIT/ML solution pen-injector  Generic drug:   Insulin Lispro (1 Unit Dial)  What changed:  how much to take   10 Units, Subcutaneous, 3 Times Daily With Meals, Follows SSI From PCP      metoprolol tartrate 25 MG tablet  Commonly known as:  LOPRESSOR  What changed:    · medication strength  · when to take this  · additional instructions   25 mg, Oral, Take As Directed, 1/2 tablet twice daily X 7 days then 1/2 tablet daily X 7 days then stop      Toujeo SoloStar 300 UNIT/ML solution pen-injector injection  Generic drug:  Insulin Glargine (1 Unit Dial)  What changed:    · how much to take  · when to take this   60 Units, Injection, Daily         Continue These Medications      Instructions Start Date   albuterol sulfate  (90 Base) MCG/ACT inhaler  Commonly known as:  Ventolin HFA   2 puffs, Inhalation, Every 4 Hours PRN      amitriptyline 25 MG tablet  Commonly known as:  ELAVIL   75 mg, Oral, Every Night at Bedtime      Asmanex  MCG/ACT aerosol  Generic drug:  Mometasone Furoate   1 puff, Inhalation, 2 Times Daily      aspirin 81 MG EC tablet   81 mg, Oral, Daily      atorvastatin 80 MG tablet  Commonly known as:  LIPITOR   80 mg, Oral, Nightly      azelastine 0.1 % nasal spray  Commonly known as:  ASTELIN   1 spray, Nasal, 2 Times Daily PRN, Use in each nostril as directed      budesonide-formoterol 80-4.5 MCG/ACT inhaler  Commonly known as:  Symbicort   2 puffs, Inhalation, 2 Times Daily - RT, Rinse mouth with water after use.      clopidogrel 75 MG tablet  Commonly known as:  PLAVIX   75 mg, Oral, Daily      coenzyme Q10 100 MG capsule   100 mg, Oral, Daily      Cycloset 0.8 MG tablet  Generic drug:  Bromocriptine Mesylate   3.2 mg, Oral, Every Morning      Dupixent 300 MG/2ML solution prefilled syringe  Generic drug:  Dupilumab   300 mg, Subcutaneous, Every 14 Days      gabapentin 600 MG tablet  Commonly known as:  NEURONTIN   600 mg, Oral, 3 Times Daily      ipratropium-albuterol 0.5-2.5 mg/3 ml nebulizer  Commonly known as:  DUO-NEB   3 mL,  Nebulization, 4 Times Daily PRN      levocetirizine 5 MG tablet  Commonly known as:  XYZAL   5 mg, Oral, Every Evening      meclizine 25 MG tablet  Commonly known as:  ANTIVERT   25 mg, Oral, 3 Times Daily PRN      omeprazole 20 MG capsule  Commonly known as:  priLOSEC   20 mg, Oral, Daily      Ozempic (0.25 or 0.5 MG/DOSE) 2 MG/1.5ML solution pen-injector  Generic drug:  Semaglutide(0.25 or 0.5MG/DOS)   0.5 mg, Subcutaneous, Weekly      promethazine 25 MG tablet  Commonly known as:  PHENERGAN   25 mg, Oral, Every 6 Hours PRN      Tiotropium Bromide Monohydrate 1.25 MCG/ACT aerosol solution inhaler  Commonly known as:  Spiriva Respimat   2 puffs, Inhalation, Daily      TiZANidine 4 MG capsule  Commonly known as:  Zanaflex   4 mg, Oral, Nightly PRN      TURMERIC PO   500 mg, Oral, 2 Times Daily      vitamin C 500 MG tablet  Commonly known as:  ASCORBIC ACID   500 mg, Oral, Daily      Vortioxetine HBr 20 MG tablet  Commonly known as:  Trintellix   20 mg, Oral, Daily      zafirlukast 20 MG tablet  Commonly known as:  Accolate   20 mg, Oral, 2 Times Daily         Stop These Medications    bumetanide 2 MG tablet  Commonly known as:  BUMEX     lisinopril 5 MG tablet  Commonly known as:  PRINIVIL,ZESTRIL     potassium chloride 10 MEQ CR tablet  Commonly known as:  K-DUR,KLOR-CON     spironolactone 25 MG tablet  Commonly known as:  ALDACTONE            Discharge Diet:     Diet Instructions     Diet: Consistent Carbohydrate, Cardiac      Discharge Diet:   Consistent Carbohydrate  Cardiac             Activity at Discharge:     Activity Instructions     Discharge Activity      No Driving or operating heavy equipment          Follow-up Appointments:    Future Appointments   Date Time Provider Department Center   8/25/2020  2:15 PM Isaura Godoy MD [facility] None   10/5/2020  9:30 AM Matilde Bains APRN [facility] None   10/6/2020  2:00 PM Isaura Godoy MD [facility] None   10/12/2020  3:00 PM Lynnette Gamble  [facility]   2/15/2021  2:15 PM Petra Crowell MD [facility] None         Test Results Pending at Discharge:     Order Current Status    Green Top (No Gel) In process    [place] Draw In process             ANTONIO Franklin  08/19/20  15:31    Time:   45 minutes were spent reviewing labs, history, evaluating patient and discharge planning.

## 2020-08-19 NOTE — OUTREACH NOTE
Prep Survey      Responses   Congregational facility patient discharged from?  [place]   Is LACE score < 7 ?  No   Eligibility  [facility]   Date of Admission  08/16/20   Date of Discharge  08/19/20   Discharge diagnosis  Orthostatic hypotension   COVID-19 Test Status  Negative   Does the patient have one of the following disease processes/diagnoses(primary or secondary)?  Other   Does the patient have Home health ordered?  No   Is there a DME ordered?  No   Prep survey completed?  Yes          Mercy Salazar RN

## 2020-08-19 NOTE — PROGRESS NOTES
Continued Stay Note   [place]     Patient Name: Denzel Harding  MRN: [id number]  Today's Date: 8/19/2020    Admit Date: 8/16/2020    Discharge Plan     Row Name 08/19/20 0836       Plan    Plan  Spoke to pt in room.  Denies DC needs.          Discharge Codes    No documentation.       Expected Discharge Date and Time     Expected Discharge Date Expected Discharge Time    Aug 19, 2020             Jayne Michele RN

## 2020-08-19 NOTE — PLAN OF CARE
Problem: Patient Care Overview  Goal: Plan of Care Review  Outcome: Ongoing (interventions implemented as appropriate)  Flowsheets (Taken 8/19/2020 8180)  Progress: no change  Plan of Care Reviewed With: patient  Outcome Summary: No acute events overnight. Patient rested well. VSS, NO events over telemetry. Continue to monitor.

## 2020-08-19 NOTE — CONSULTS
[facility] Consult Note    Referring Provider: Denise  Reason for Consultation: Dizziness    Patient Care Team:  Isaura Godoy MD as PCP - General (Internal Medicine)  London Jiménez III, MD as Cardiologist (Cardiology)  Blaine Waters MD as Consulting Physician (Endocrinology)  Torres Kim MD as Consulting Physician (General Surgery)  Radha Lord, CLAUDIA as Ambulatory  ([facility])    Chief complaint : Dizziness    Subjective:    History of present illness: This is a 60-year-old male patient who is been experiencing severe orthostatic hypotension and presyncope with severe dizziness on assuming upright posture over the last several weeks.  The patient has a history of coronary artery disease and has undergone coronary artery bypass surgery.  The patient has a history of poorly controlled type 2 diabetes mellitus.  He is noncompliant with his dietary carbohydrate restrictions.  He does not drink alcoholic beverages.  The patient reports severe dizziness and presyncope when rising from a seated position for standing from a supine position.  He has not lost consciousness.  He has no history of arrhythmia.  The patient has had an extensive neurologic evaluation recently for the same symptoms.  No abnormalities were discovered.  He has had no recurrent chest discomfort at rest or with activity.  There is no shortness of breath.  There is no orthopnea PND or lower extremity edema.  He has had no palpitations.  The patient indicates that he is ACE inhibitor therapy was titrated down from 40 mg a day to 5 mg a day without improvement in symptoms.  His lisinopril was subsequently discontinued.  The patient was taken off of amlodipine without improvement in his symptoms.  He is on no diuretic therapy.  His beta-blocker dose has been decreased from Lopressor 100 mg twice a day to 25 mg orally twice per day without improvement in symptoms.  His resting supine blood pressures have ranged from  150-175 mmHg systolic.    Review of Systems   Review of Systems   Constitution: Negative for chills, diaphoresis, fever, malaise/fatigue, weight gain and weight loss.   HENT: Negative for ear discharge, hearing loss, hoarse voice and nosebleeds.    Eyes: Negative for discharge, double vision, pain and photophobia.   Cardiovascular: Positive for near-syncope. Negative for chest pain, claudication, cyanosis, dyspnea on exertion, irregular heartbeat, leg swelling, orthopnea, palpitations, paroxysmal nocturnal dyspnea and syncope.   Respiratory: Negative for cough, hemoptysis, sputum production and wheezing.    Endocrine: Negative for cold intolerance, heat intolerance, polydipsia, polyphagia and polyuria.   Hematologic/Lymphatic: Negative for adenopathy and bleeding problem. Does not bruise/bleed easily.   Skin: Negative for color change, flushing, itching and rash.   Musculoskeletal: Negative for muscle cramps, muscle weakness, myalgias and stiffness.   Gastrointestinal: Negative for abdominal pain, diarrhea, hematemesis, hematochezia, nausea and vomiting.   Genitourinary: Negative for dysuria, frequency and nocturia.   Neurological: Positive for dizziness and light-headedness. Negative for focal weakness, loss of balance, numbness, paresthesias and seizures.   Psychiatric/Behavioral: Negative for altered mental status, hallucinations and suicidal ideas.   Allergic/Immunologic: Negative for HIV exposure, hives and persistent infections.       History  Past Medical History:   Diagnosis Date   • Anxiety    • Arthritis    • Asthma    • Brachial neuritis 1/19/2017   • Cellulitis     left arm and right leg    • Chest pain    • CHF (congestive heart failure) (CMS/[facility])     Patient reported history May 2020    • COPD (chronic obstructive pulmonary disease) (CMS/[facility])    • Coronary artery disease     Patient reported CABG , 3 vessel   • Depression    • Diabetes mellitus (CMS/[facility])     diagnosed in 1998, checks fsbg  3-4x/day   • Dizziness    • Edema    • Elevated cholesterol    • GERD (gastroesophageal reflux disease)    • H/O chest x-ray 07/04/2015    Mild Left base atelectasis   • H/O echocardiogram 07/05/2015    Normal LVSF. EF of 60-65%. Grade 1 diastolic dysfunction of the LV myocardium. No evidence of pericardial effusion   • H/O exercise stress test    • Hearing loss     No use of hearing aids   • History of fracture     Reported 2 bones in left foot and a finger   • History of herniated intervertebral disc     History of left L5-S1 disc herniation   • History of pneumonia    • History of pneumonia    • Hypertension    • Impaired functional mobility, balance, gait, and endurance    • LOM (loss of memory) 1/19/2017   • Lower back pain     Right   • Measles    • Neuropathy     feet    • EDD treated with BiPAP     BiPAP HS - instructed to bring mask/machine DOS (setting 13 and 5)   • Palpitations    • Pericardial effusion    • Poor dentition     Patient reported missing multiple teeth   • Renal disorder    • Seizure disorder (CMS/HCC)    • Seizures (CMS/HCC)     FOCAL last 2009   • Shortness of breath 1/19/2017   • SOB (shortness of breath)    • Staph infection     back after sugery at the incision site    • Stroke (CMS/HCC)     x2 most recent , slight weakness in hands occasionaly    • Wears glasses    ,   Past Surgical History:   Procedure Laterality Date   • BACK SURGERY     • CARDIAC CATHETERIZATION     • CARDIAC CATHETERIZATION N/A 8/11/2017    Procedure: Coronary angiography;  Surgeon: Dallas Kim MD;  Location: [facility] CATH INVASIVE LOCATION;  Service:    • CARDIAC CATHETERIZATION N/A 8/11/2017    Procedure: Left Heart Cath;  Surgeon: Dallas Kim MD;  Location: [facility] CATH INVASIVE LOCATION;  Service:    • CARDIAC CATHETERIZATION N/A 8/11/2017    Procedure: Left ventriculography;  Surgeon: Dallas Kim MD;  Location: [facility] CATH INVASIVE LOCATION;  Service:    • CARDIAC CATHETERIZATION       2002 x1 stent,  x1 stent   • CARDIOVASCULAR STRESS TEST  07/03/2017    WITH DR HERNANDEZ AT [facility]   • CARPAL TUNNEL RELEASE Right    • CATARACT EXTRACTION W/ INTRAOCULAR LENS IMPLANT Right 6/12/2017    Procedure: CATARACT PHACO EXTRACTION WITH INTRAOCULAR LENS IMPLANT RIGHT ;  Surgeon: Natalie Cao MD;  Location: [facility] OR;  Service:    • CATARACT EXTRACTION W/ INTRAOCULAR LENS IMPLANT Left 7/10/2017    Procedure: CATARACT PHACO EXTRACTION WITH INTRAOCULAR LENS IMPLANT LEFT;  Surgeon: Natalie Cao MD;  Location: [facility] OR;  Service:    • CHOLECYSTECTOMY     • COLONOSCOPY     • COLONOSCOPY N/A 7/2/2020    Procedure: COLONOSCOPY;  Surgeon: Petra Crowell MD;  Location: [facility] ENDOSCOPY;  Service: Gastroenterology;  Laterality: N/A;  poor prep   • CORONARY ANGIOPLASTY WITH STENT PLACEMENT      X1-LAD   • CORONARY ARTERY BYPASS GRAFT N/A 8/15/2017    Procedure: CORONARY ARTERY BYPASS GRAFTING x 3 UTILIZING THE LEFT INTERNAL MAMMARY ARTERY WITH ENDOSCOPIC VEIN HARVESTING OF THE RIGHT GREATER SAPHENOUS VEIN, HAMLET, LAD ENDARECTOMY;  Surgeon: London Damon MD;  Location: [facility] OR;  Service:    • ENDOSCOPY     • EYE CAPSULOTOMY WITH LASER Right 11/25/2019    Procedure: EYE CAPSULOTOMY WITH LASER RIGHT;  Surgeon: Natalie Cao MD;  Location: [facility] OR;  Service: Ophthalmology   • EYE CAPSULOTOMY WITH LASER Left 12/9/2019    Procedure: EYE CAPSULOTOMY WITH LASER LEFT;  Surgeon: Natalie Cao MD;  Location: [facility] OR;  Service: Ophthalmology   • EYE SURGERY      cataract surgery both eyes   • INTERVENTIONAL RADIOLOGY PROCEDURE Bilateral 12/31/2019    Procedure: Carotid Cerebral Angiogram;  Surgeon: Damian Dubois MD;  Location: [facility] CATH INVASIVE LOCATION;  Service: Interventional Radiology   • KNEE ARTHROSCOPY Bilateral    • KNEE ARTHROSCOPY Right 2010    Dr Lewis   • KNEE ARTHROSCOPY Left 2008    Dr Jameson   • MOUTH SURGERY      Oral extractions   • NEUROPLASTY / TRANSPOSITION ULNAR NERVE AT ELBOW Left     • OTHER SURGICAL HISTORY      Foraminotomy and discectomy   • PERICARDIAL WINDOW N/A 2017    Procedure: PERICARDIAL WINDOW;  Surgeon: Freeman Phillips MD;  Location: [facility];  Service:    ,   Family History   Problem Relation Age of Onset   • Hypertension Mother    • Arthritis Mother    • Alcohol abuse Father    • Heart disease Other    • Stroke Other    • Hypertension Other    • Other Other         Neurologic disorder   • Parkinsonism Other    • Stroke Other    • Heart disease Other    • Hypertension Other    • Heart disease Other    • Stroke Other    • Hypertension Other    ,   Social History     Tobacco Use   • Smoking status: Former Smoker     Packs/day: 1.00     Years: 10.00     Pack years: 10.00     Types: Cigarettes     Last attempt to quit: 1998     Years since quittin.6   • Smokeless tobacco: Former User     Types: Snuff     Quit date:    Substance Use Topics   • Alcohol use: Yes     Frequency: Monthly or less     Drinks per session: 3 or 4     Comment: 3 PER YEAR   • Drug use: No   ,   Medications Prior to Admission   Medication Sig Dispense Refill Last Dose   • flunisolide (NASALIDE) 25 MCG/ACT (0.025%) solution nasal spray Inhale 2 sprays Every 12 (Twelve) Hours. (Patient taking differently: Inhale 2 sprays 2 (Two) Times a Day As Needed.) 25 mL 2 Past Month at Unknown time   • HUMALOG KWIKPEN 100 UNIT/ML solution pen-injector Inject 10 Units under the skin 3 (Three) Times a Day With Meals. Follows [facility]SI From PCP (Patient taking differently: Inject 50 Units under the skin into the appropriate area as directed 3 (Three) Times a Day With Meals. Follows [facility]SI From PCP)  0 2020 at Unknown time   • zafirlukast (Accolate) 20 MG tablet Take 1 tablet by mouth 2 (Two) Times a Day. 60 tablet 5 Past Month at Unknown time   • albuterol sulfate HFA (Ventolin HFA) 108 (90 Base) MCG/ACT inhaler Inhale 2 puffs Every 4 (Four) Hours As Needed for Wheezing or Shortness of Air. 1 inhaler 4 2020 at  0800   • amitriptyline (ELAVIL) 25 MG tablet Take 3 tablets by mouth every night at bedtime. 270 tablet 0 8/15/2020   • ASMANEX  MCG/ACT aerosol Inhale 1 puff 2 (Two) Times a Day. 1 inhaler 5 8/16/2020 at 0800   • aspirin 81 MG EC tablet Take 81 mg by mouth Daily.   8/16/2020   • atorvastatin (LIPITOR) 80 MG tablet Take 1 tablet by mouth Every Night. 30 tablet 5 8/15/2020   • azelastine (ASTELIN) 0.1 % nasal spray 1 spray into the nostril(s) as directed by provider 2 (Two) Times a Day As Needed for Rhinitis or Allergies. Use in each nostril as directed 1 each 5 More than a month at Unknown time   • budesonide-formoterol (Symbicort) 80-4.5 MCG/ACT inhaler Inhale 2 puffs 2 (Two) Times a Day. Rinse mouth with water after use. 1 inhaler 5 8/16/2020 at 0800   • bumetanide (BUMEX) 2 MG tablet Take 1 tablet by mouth Daily. Take extra 2mg as needed for 3lb wt gain in 24hrs, increased shortness of breath 60 tablet 5 8/16/2020   • clopidogrel (PLAVIX) 75 MG tablet Take 75 mg by mouth Daily.   8/16/2020   • coenzyme Q10 100 MG capsule Take 100 mg by mouth Daily.   8/15/2020   • CYCLOSET 0.8 MG tablet Take 3.2 mg by mouth Every Morning.  0 8/16/2020 at 0800   • DUPIXENT 300 MG/2ML solution prefilled syringe Inject 300 mg under the skin into the appropriate area as directed Every 14 (Fourteen) Days. 2 syringe 11 8/9/2020   • gabapentin (NEURONTIN) 600 MG tablet Take 600 mg by mouth 3 (Three) Times a Day.  0 8/16/2020 at 0800   • ipratropium-albuterol (DUO-NEB) 0.5-2.5 mg/3 ml nebulizer Take 3 mL by nebulization 4 (Four) Times a Day As Needed for Wheezing or Shortness of Air. 360 mL 5 8/16/2020 at 0800   • levocetirizine (XYZAL) 5 MG tablet Take 1 tablet by mouth Every Evening. 90 tablet 1 8/16/2020   • lisinopril (PRINIVIL,ZESTRIL) 5 MG tablet Take 5 mg by mouth Daily.   8/16/2020   • meclizine (ANTIVERT) 25 MG tablet Take 1 tablet by mouth 3 (Three) Times a Day As Needed for dizziness. 10 tablet 0 8/15/2020   •  "metoprolol tartrate (LOPRESSOR) 50 MG tablet Take 0.5 tablets by mouth 2 (Two) Times a Day. 30 tablet 0 8/16/2020   • omeprazole (priLOSEC) 20 MG capsule Take 1 capsule by mouth Daily. 30 capsule 5 8/15/2020   • OZEMPIC, 0.25 OR 0.5 MG/DOSE, 2 MG/1.5ML solution pen-injector Inject 0.5 mg under the skin into the appropriate area as directed 1 (One) Time Per Week.   8/16/2020   • potassium chloride (K-DUR,KLOR-CON) 10 MEQ CR tablet Take 10 mEq by mouth Daily.   8/15/2020   • promethazine (PHENERGAN) 25 MG tablet Take 1 tablet by mouth Every 6 (Six) Hours As Needed for Nausea or Vomiting. 20 tablet 0 8/15/2020   • spironolactone (ALDACTONE) 25 MG tablet Take 1 tablet by mouth Daily. 30 tablet 5 8/16/2020   • Tiotropium Bromide Monohydrate (SPIRIVA RESPIMAT) 1.25 MCG/ACT aerosol solution inhaler Inhale 2 puffs Daily. 1 inhaler 5 8/16/2020 at 0800   • TiZANidine (Zanaflex) 4 MG capsule Take 1 capsule by mouth At Night As Needed for Muscle Spasms. 30 capsule 5 8/15/2020   • TOUJEO SOLOSTAR 300 UNIT/ML solution pen-injector Inject 60 Units as directed Daily. (Patient taking differently: Inject 120 Units as directed 2 (two) times a day.)  0 8/16/2020   • TURMERIC PO Take 500 mg by mouth 2 (Two) Times a Day.   8/16/2020   • vitamin C (ASCORBIC ACID) 500 MG tablet Take 500 mg by mouth Daily.   8/15/2020   • Vortioxetine HBr (Trintellix) 20 MG tablet Take 20 mg by mouth Daily. 30 tablet 5 8/16/2020 at 0800    and Allergies:  Sulfa antibiotics; Invokana [canagliflozin]; Codeine; and Morphine    Objective:    Vital Sign Min/Max for last 24 hours  Temp  Min: 98 °F (36.7 °C)  Max: 99.1 °F (37.3 °C)   BP  Min: 143/58  Max: 172/69   Pulse  Min: 82  Max: 107   Resp  Min: 18  Max: 20   SpO2  Min: 96 %  Max: 100 %   No data recorded   Weight  Min: 137 kg (302 lb 0.5 oz)  Max: 137 kg (302 lb 0.5 oz)     Flowsheet Rows      First Filed Value   Admission Height  177.8 cm (70\") Documented at 08/16/2020 2346   Admission Weight  134 kg (296 " lb) Documented at 08/16/2020 2346             Ejection Fraction  Lab Results   Component Value Date    EF 67 09/16/2019       Echo EF Estimated  Lab Results   Component Value Date    ECHOEFEST 60 06/19/2017       Physical Exam:   Physical Exam   Constitutional: He is oriented to person, place, and time. He appears well-developed and well-nourished. No distress.   HENT:   Head: Normocephalic and atraumatic.   Mouth/Throat: Oropharynx is clear and moist.   Eyes: Pupils are equal, round, and reactive to light. Conjunctivae and EOM are normal. No scleral icterus.   Neck: Normal range of motion. Neck supple. No JVD present. No tracheal deviation present. No thyromegaly present.   Cardiovascular: Normal rate, regular rhythm, S1 normal, S2 normal, normal heart sounds, intact distal pulses and normal pulses. PMI is not displaced. Exam reveals no gallop and no friction rub.   No murmur heard.  Pulmonary/Chest: Effort normal and breath sounds normal. No stridor. No respiratory distress. He has no wheezes. He has no rales.   Abdominal: Soft. Bowel sounds are normal. He exhibits no distension and no mass. There is no tenderness. There is no rebound and no guarding.   Musculoskeletal: Normal range of motion. He exhibits no edema or deformity.   Neurological: He is alert and oriented to person, place, and time. He displays normal reflexes. No cranial nerve deficit. Coordination normal.   Skin: Skin is warm and dry. No rash noted. He is not diaphoretic. No erythema.   Psychiatric: He has a normal mood and affect. His behavior is normal. Thought content normal.       Results Review:   I reviewed the patient's new clinical results.  Results from last 7 days   Lab Units 08/19/20  0634 08/18/20  0612 08/17/20  0012   WBC 10*3/mm3 5.18 5.43 9.92   HEMOGLOBIN g/dL 11.6* 11.3* 12.9*   HEMATOCRIT % 35.2* 34.7* 37.7   PLATELETS 10*3/mm3 172 166 218     Results from last 7 days   Lab Units 08/19/20  0634 08/18/20  0612 08/17/20  4269    SODIUM mmol/L 139 138 135*   POTASSIUM mmol/L 4.5 4.7 4.5   CHLORIDE mmol/L 98 101 99   CO2 mmol/L 27.8 26.1 25.2   BUN mg/dL 12 13 17   CREATININE mg/dL 1.00 1.07 1.30*   GLUCOSE mg/dL 317* 246* 248*   CALCIUM mg/dL 9.7 9.7 9.1     Lab Results   Lab Value Date/Time    TROPONINT 0.026 08/17/2020 0012    TROPONINT 0.021 08/03/2020 1337    TROPONINT 0.028 07/08/2020 1443    TROPONINT 0.019 06/23/2020 1852    TROPONINT 0.011 05/04/2020 1235    TROPONINT 0.012 02/17/2020 1845    TROPONINT 0.012 11/04/2019 1457    TROPONINT 0.018 10/15/2019 2225    TROPONINT 0.022 10/15/2019 1618    TROPONINT 0.019 09/26/2019 2219    TROPONINT 0.029 09/26/2019 1821    TROPONINT <0.010 09/15/2019 0048    TROPONINT 0.028 09/14/2019 1955    TROPONINT 0.036 (C) 09/14/2019 1744    TROPONINT <0.010 09/06/2019 1657    TROPONINT <0.010 09/06/2019 1439    TROPONINT <0.010 08/29/2019 1320    TROPONINT <0.010 08/17/2019 1551    TROPONINT <0.010 08/17/2019 1406             Assessment/Plan:    Orthostatic hypotension.  Severe symptomatic orthostatic hypotension in the context of resting supine hypertension.  This represents profound autonomic failure due to diabetic neuropathy.  There is no 1 effective medication which will control his blood pressure without resulting in worsening orthostasis.  There is obviously a trade-off between excepting supine resting hypertension which is permissible and maintaining a degree of blood pressure control that does not resulting in symptomatic orthostatic hypotension.  The #1 priority is to prevent loss of consciousness from severe postural hypotension.  Coronary artery disease.  Native heart.  Native vessels.  Angina free.  Prior coronary artery bypass surgery.  Poorly controlled type 2 diabetes mellitus.  Typical obesity related insulin resistance.      I would recommend tapering his beta-blocker off completely over the next 2 weeks.  I would recommend discontinuation of all antihypertensive therapy.  I would  recommend using clonidine 0.1 mg orally every 6 hours as needed for systolic blood pressure greater than 170 mmHg and/or diastolic blood pressure greater than 100 mmHg.  I would recommend Jobst stockings: 20-30 mmHg compression; above the knee model.  It has been explained to the patient that these will require a prescription and he will have to have them custom fitted at the medical supply warehouse here in [place].  The patient has been advised to increase his fluid intake by gauging his urine output and color.  He has been advised that he should urinate every  minutes.  If he goes longer than 2 hours without urination he will need to drink a full 32 ounces of water.  The patient has been counseled that if he sees any yellow discoloration to his urine that he should immediately drink 32 ounces of water as quickly as possible.  He has been instructed to supplement his water intake with an electrolyte solution such as Gatorade, Powerade, smart water, etc.  He is instructed to drink his water to electrolyte solution and a 4: 1 ratio.  The patient has been counseled to avoid beverages that have diuretic properties such as alcoholic beverages and caffeine-containing beverages.  The patient has been counseled regarding the importance of diet exercise and weight management.  The patient has been educated that compression stockings is the most effective therapy for treating his condition.  Patient has been advised that these stockings are not off-the-shelf over-the-counter compression socks/stockings.  The patient has been forewarned that they are extremely difficult to get off and on and are uncomfortable to wear but they must be worn every day throughout the course of the day until he is ready to go to bed at night.  If the patient's symptoms worsen he may be a candidate for pharmacologic therapy such as Florinef, Nothera or Mididrine.  These therapies will only be considered if he fails the conservative approach  outlined above.  There is considerable's concern that using these medications could result and severe hypertension.  The patient can follow-up in our outpatient clinic in 2-3 weeks for further evaluation.  No additional inpatient testing is indicated at this time.  The patient has been advised to increase his dietary sodium intake.  He has been counseled that this will result in increasing blood pressure and swelling of his feet and ankles as well as his hands and face.  The patient has been advised that he should not drive or operate motor vehicles.  The patient has been counseled to avoid changing posture rapidly.  He has been counseled and educated regarding self protection measures.  Specifically he has been advised that if he develops severe dizziness with presyncope on changing posture to immediately lie back down and elevate his legs.  The patient has been counseled regarding the counter pressure maneuvers which have been demonstrated to the patient as well.  The patient has been advised to contact our office before starting any antihypertensive therapy that may be initiated by primary care providers or other physicians who were unaware of the profound degree of his orthostatic hypotension.    I discussed the patients findings and my recommendations with patient and consulting provider    Dallas Kim MD  08/19/20  13:46

## 2020-08-20 ENCOUNTER — EPISODE CHANGES (OUTPATIENT)
Dept: CASE MANAGEMENT | Facility: OTHER | Age: 60
End: 2020-08-20

## 2020-08-20 ENCOUNTER — TRANSITIONAL CARE MANAGEMENT TELEPHONE ENCOUNTER (OUTPATIENT)
Dept: CALL CENTER | Facility: HOSPITAL | Age: 60
End: 2020-08-20

## 2020-08-20 NOTE — OUTREACH NOTE
Call Center TCM Note      Responses   [facility] patient discharged from?  [facility]   COVID-19 Test Status  Negative   Does the patient have one of the following disease processes/diagnoses(primary or secondary)?  Other   TCM attempt successful?  Yes   Call start time  1246   Call end time  1247   Discharge diagnosis  Orthostatic hypotension   Is patient permission given to speak with other caregiver?  Yes   Person spoke with today (if not patient) and relationship  Mrs Harding. sp    Meds reviewed with patient/caregiver?  Yes   Is the patient having any side effects they believe may be caused by any medication additions or changes?  No   Does the patient have all medications ordered at discharge?  Yes   Is the patient taking all medications as directed (includes completed medication regime)?  Yes   Does the patient have a primary care provider?   Yes   Does the patient have an appointment with their PCP within 7 days of discharge?  Yes   Comments regarding PCP  f/u with Dr. Godoy has appt  on 8/25/2020   Has the patient kept scheduled appointments due by today?  N/A   Has home health visited the patient within 72 hours of discharge?  N/A   Psychosocial issues?  No   Comments  Encouraged wife to check B/P    Did the patient receive a copy of their discharge instructions?  Yes   Nursing interventions  Reviewed instructions with patient   What is the patient's perception of their health status since discharge?  Improving   Is the patient/caregiver able to teach back signs and symptoms related to disease process for when to call PCP?  Yes   Is the patient/caregiver able to teach back signs and symptoms related to disease process for when to call 911?  Yes   Is the patient/caregiver able to teach back the hierarchy of who to call/visit for symptoms/problems? PCP, Specialist, Home health nurse, Urgent Care, ED, 911  Yes   TCM call completed?  Yes          Zander Kapadia RN    8/20/2020, 12:48

## 2020-08-20 NOTE — PROGRESS NOTES
Case Management Discharge Note           Provided Post Acute Provider List?: N/A  N/A Provider List Comment: No DME, HH or Skilled nursing needs            Transportation Services  Private: Car    8/20/20 Received DME order for Jobst Stockings.  Called to [facility] spoke to Lillian.  Will have to be measured and ordered.  Insurance will usually not cover them.  Called to pt's phone and spoke to his wife Carol, states pt is not available.  Ask if he had the Rx for for Jobst stocking and she stated yes.  Informed could take the Rx to [facility] and would need to get them ordered.  Wife replied they could do it.  Phone no of [facility] given  and they are to call and make an appt.  Order faxed to them at [phone]           Final Discharge Disposition Code: 01 - home or self-care

## 2020-08-27 ENCOUNTER — READMISSION MANAGEMENT (OUTPATIENT)
Dept: CALL CENTER | Facility: HOSPITAL | Age: 60
End: 2020-08-27

## 2020-08-27 NOTE — OUTREACH NOTE
Medical Week 2 Survey      Responses   [facility] patient discharged from?  [facility]   COVID-19 Test Status  Negative   Does the patient have one of the following disease processes/diagnoses(primary or secondary)?  Other   Week 2 attempt successful?  Yes   Call start time  0810   Discharge diagnosis  Orthostatic hypotension   Call end time  0814   Person spoke with today (if not patient) and relationship  Carol, wife   Meds reviewed with patient/caregiver?  Yes   Is the patient taking all medications as directed (includes completed medication regime)?  Yes   Has the patient kept scheduled appointments due by today?  No   Nursing Interventions  Advised patient to keep appointment   Comments  Rescheduled appt, his mother was sick   Pulse Ox monitoring  None   What is the patient's perception of their health status since discharge?  Improving   Week 2 Call Completed?  Yes   Wrap up additional comments  Rescheduled appt.  BP more stable.  He has started drinking more water, 7up and not doing marizol and tea.            Jina Lindo RN

## 2020-08-31 ENCOUNTER — OFFICE VISIT (OUTPATIENT)
Dept: ORTHOPEDIC SURGERY | Facility: CLINIC | Age: 60
End: 2020-08-31

## 2020-08-31 ENCOUNTER — OFFICE VISIT (OUTPATIENT)
Dept: CARDIOLOGY | Facility: CLINIC | Age: 60
End: 2020-08-31

## 2020-08-31 VITALS
WEIGHT: 301 LBS | HEIGHT: 70 IN | DIASTOLIC BLOOD PRESSURE: 66 MMHG | SYSTOLIC BLOOD PRESSURE: 132 MMHG | HEART RATE: 72 BPM | BODY MASS INDEX: 43.09 KG/M2 | OXYGEN SATURATION: 96 %

## 2020-08-31 VITALS — WEIGHT: 301 LBS | BODY MASS INDEX: 43.09 KG/M2 | RESPIRATION RATE: 18 BRPM | HEIGHT: 70 IN

## 2020-08-31 DIAGNOSIS — I10 ESSENTIAL HYPERTENSION: ICD-10-CM

## 2020-08-31 DIAGNOSIS — M17.11 PRIMARY LOCALIZED OSTEOARTHROSIS OF THE KNEE, RIGHT: ICD-10-CM

## 2020-08-31 DIAGNOSIS — M25.562 ARTHRALGIA OF BOTH KNEES: Primary | ICD-10-CM

## 2020-08-31 DIAGNOSIS — J43.2 CENTRILOBULAR EMPHYSEMA (HCC): ICD-10-CM

## 2020-08-31 DIAGNOSIS — M17.12 PRIMARY LOCALIZED OSTEOARTHRITIS OF LEFT KNEE: ICD-10-CM

## 2020-08-31 DIAGNOSIS — E66.01 MORBID OBESITY (HCC): ICD-10-CM

## 2020-08-31 DIAGNOSIS — I50.32 CHRONIC DIASTOLIC CHF (CONGESTIVE HEART FAILURE) (HCC): ICD-10-CM

## 2020-08-31 DIAGNOSIS — M25.561 ARTHRALGIA OF BOTH KNEES: Primary | ICD-10-CM

## 2020-08-31 DIAGNOSIS — I25.10 CORONARY ARTERIOSCLEROSIS IN NATIVE ARTERY: Primary | ICD-10-CM

## 2020-08-31 PROBLEM — G40.909 SEIZURE DISORDER (HCC): Status: RESOLVED | Noted: 2017-01-19 | Resolved: 2020-08-31

## 2020-08-31 PROCEDURE — 99214 OFFICE O/P EST MOD 30 MIN: CPT | Performed by: INTERNAL MEDICINE

## 2020-08-31 PROCEDURE — 99213 OFFICE O/P EST LOW 20 MIN: CPT | Performed by: PHYSICIAN ASSISTANT

## 2020-08-31 NOTE — PROGRESS NOTES
Subjective   Patient ID: Denzel Harding is a 60 y.o. right hand dominant male  Pain of the Right Knee (R>L, left only mild pain) and Pain of the Left Knee (States he feels as though he got significant relief from the Supartz series completed in March 2020)         History of Present Illness    Patient presents for a scheduled follow-up visit regarding bilateral knee pain.  He states after receiving the Visco supplementation injections March 2020 the left knee responded well to the injections.  He reports constant right anterior medial aching knee pain.  Denies knee instability but does note occasional locking of the right knee.  He has to utilize a cane to ambulate.  He does take over-the-counter analgesics without improvement      Past Medical History:   Diagnosis Date   • Anxiety    • Arthritis    • Asthma    • Brachial neuritis 1/19/2017   • Cellulitis     left arm and right leg    • Chest pain    • CHF (congestive heart failure) (CMS/Formerly [facility])     Patient reported history May 2020    • COPD (chronic obstructive pulmonary disease) (CMS/Formerly [facility])    • Coronary artery disease     Patient reported CABG , 3 vessel   • Depression    • Diabetes mellitus (CMS/Formerly [facility])     diagnosed in 1998, checks fsbg 3-4x/day   • Dizziness    • Edema    • Elevated cholesterol    • GERD (gastroesophageal reflux disease)    • H/O chest x-ray 07/04/2015    Mild Left base atelectasis   • H/O echocardiogram 07/05/2015    Normal LVSF. EF of 60-65%. Grade 1 diastolic dysfunction of the LV myocardium. No evidence of pericardial effusion   • H/O exercise stress test    • Hearing loss     No use of hearing aids   • History of fracture     Reported 2 bones in left foot and a finger   • History of herniated intervertebral disc     History of left L5-S1 disc herniation   • History of pneumonia    • History of pneumonia    • Hypertension    • Impaired functional mobility, balance, gait, and endurance    • LOM (loss of memory) 1/19/2017   • Lower back  pain     Right   • Measles    • Neuropathy     feet    • EDD treated with BiPAP     BiPAP HS - instructed to bring mask/machine DOS (setting 13 and 5)   • Palpitations    • Pericardial effusion    • Poor dentition     Patient reported missing multiple teeth   • Renal disorder    • Seizure disorder (CMS/HCC)    • Seizures (CMS/HCC)     FOCAL last 2009   • Shortness of breath 1/19/2017   • SOB (shortness of breath)    • Staph infection     back after sugery at the incision site    • Stroke (CMS/HCC)     x2 most recent , slight weakness in hands occasionaly    • Wears glasses         Past Surgical History:   Procedure Laterality Date   • BACK SURGERY     • CARDIAC CATHETERIZATION     • CARDIAC CATHETERIZATION N/A 8/11/2017    Procedure: Coronary angiography;  Surgeon: Dallas Kim MD;  Location: [facility] CATH INVASIVE LOCATION;  Service:    • CARDIAC CATHETERIZATION N/A 8/11/2017    Procedure: Left Heart Cath;  Surgeon: Dallas Kim MD;  Location: [facility] CATH INVASIVE LOCATION;  Service:    • CARDIAC CATHETERIZATION N/A 8/11/2017    Procedure: Left ventriculography;  Surgeon: Dallas Kim MD;  Location: [facility] CATH INVASIVE LOCATION;  Service:    • CARDIAC CATHETERIZATION      2002 x1 stent,  x1 stent   • CARDIOVASCULAR STRESS TEST  07/03/2017    WITH DR KIM AT [facility]   • CARPAL TUNNEL RELEASE Right    • CATARACT EXTRACTION W/ INTRAOCULAR LENS IMPLANT Right 6/12/2017    Procedure: CATARACT PHACO EXTRACTION WITH INTRAOCULAR LENS IMPLANT RIGHT ;  Surgeon: Natalie Cao MD;  Location: [facility] OR;  Service:    • CATARACT EXTRACTION W/ INTRAOCULAR LENS IMPLANT Left 7/10/2017    Procedure: CATARACT PHACO EXTRACTION WITH INTRAOCULAR LENS IMPLANT LEFT;  Surgeon: Natalie Cao MD;  Location: [facility] OR;  Service:    • CHOLECYSTECTOMY     • COLONOSCOPY     • COLONOSCOPY N/A 7/2/2020    Procedure: COLONOSCOPY;  Surgeon: Petra Crowell MD;  Location: [facility] ENDOSCOPY;  Service: Gastroenterology;   Laterality: N/A;  poor prep   • CORONARY ANGIOPLASTY WITH STENT PLACEMENT      X1-LAD   • CORONARY ARTERY BYPASS GRAFT N/A 8/15/2017    Procedure: CORONARY ARTERY BYPASS GRAFTING x 3 UTILIZING THE LEFT INTERNAL MAMMARY ARTERY WITH ENDOSCOPIC VEIN HARVESTING OF THE RIGHT GREATER SAPHENOUS VEIN, HAMLET, LAD ENDARECTOMY;  Surgeon: London Damon MD;  Location:  [facility] OR;  Service:    • ENDOSCOPY     • EYE CAPSULOTOMY WITH LASER Right 11/25/2019    Procedure: EYE CAPSULOTOMY WITH LASER RIGHT;  Surgeon: Nataile Cao MD;  Location:  [facility];  Service: Ophthalmology   • EYE CAPSULOTOMY WITH LASER Left 12/9/2019    Procedure: EYE CAPSULOTOMY WITH LASER LEFT;  Surgeon: Natalie Cao MD;  Location:  [facility];  Service: Ophthalmology   • EYE SURGERY      cataract surgery both eyes   • INTERVENTIONAL RADIOLOGY PROCEDURE Bilateral 12/31/2019    Procedure: Carotid Cerebral Angiogram;  Surgeon: Damian Dubois MD;  Location:  [facility] CATH INVASIVE LOCATION;  Service: Interventional Radiology   • KNEE ARTHROSCOPY Bilateral    • KNEE ARTHROSCOPY Right 2010    Dr Lewis   • KNEE ARTHROSCOPY Left 2008    Dr Jameson   • MOUTH SURGERY      Oral extractions   • NEUROPLASTY / TRANSPOSITION ULNAR NERVE AT ELBOW Left    • OTHER SURGICAL HISTORY      Foraminotomy and discectomy   • PERICARDIAL WINDOW N/A 8/25/2017    Procedure: PERICARDIAL WINDOW;  Surgeon: Freeman Phillips MD;  Location:  [facility] OR;  Service:        Family History   Problem Relation Age of Onset   • Hypertension Mother    • Arthritis Mother    • Alcohol abuse Father    • Heart disease Other    • Stroke Other    • Hypertension Other    • Other Other         Neurologic disorder   • Parkinsonism Other    • Stroke Other    • Heart disease Other    • Hypertension Other    • Heart disease Other    • Stroke Other    • Hypertension Other        Social History     Socioeconomic History   • Marital status:      Spouse name: Not on file   • Number of children: 1   • Years of  education: Not on file   • Highest education level: Not on file   Occupational History   • Occupation: [employer]     Employer: DISABLED     Comment: Disabled since -Back Problems   Tobacco Use   • Smoking status: Former Smoker     Packs/day: 1.00     Years: 10.00     Pack years: 10.00     Types: Cigarettes     Last attempt to quit: 1998     Years since quittin.6   • Smokeless tobacco: Former User     Types: Snuff     Quit date:    Substance and Sexual Activity   • Alcohol use: Yes     Frequency: Monthly or less     Drinks per session: 3 or 4     Comment: 3 PER YEAR   • Drug use: No   • Sexual activity: Defer   Social History Narrative    Caffeine use: 0-1 serving daily.    Patient lives at home with wife.          Current Outpatient Medications:   •  albuterol sulfate HFA (Ventolin HFA) 108 (90 Base) MCG/ACT inhaler, Inhale 2 puffs Every 4 (Four) Hours As Needed for Wheezing or Shortness of Air., Disp: 1 inhaler, Rfl: 4  •  amitriptyline (ELAVIL) 25 MG tablet, Take 3 tablets by mouth every night at bedtime., Disp: 270 tablet, Rfl: 0  •  ASMANEX  MCG/ACT aerosol, Inhale 1 puff 2 (Two) Times a Day., Disp: 1 inhaler, Rfl: 5  •  aspirin 81 MG EC tablet, Take 81 mg by mouth Daily., Disp: , Rfl:   •  atorvastatin (LIPITOR) 80 MG tablet, Take 1 tablet by mouth Every Night., Disp: 30 tablet, Rfl: 5  •  budesonide-formoterol (Symbicort) 80-4.5 MCG/ACT inhaler, Inhale 2 puffs 2 (Two) Times a Day. Rinse mouth with water after use., Disp: 1 inhaler, Rfl: 5  •  cloNIDine (Catapres) 0.1 MG tablet, Take 1 tablet by mouth every 6 hours as needed for SBP > 170 or DBP > 100, Disp: 120 tablet, Rfl: 0  •  clopidogrel (PLAVIX) 75 MG tablet, Take 75 mg by mouth Daily., Disp: , Rfl:   •  coenzyme Q10 100 MG capsule, Take 100 mg by mouth Daily., Disp: , Rfl:   •  CYCLOSET 0.8 MG tablet, Take 3.2 mg by mouth Every Morning., Disp: , Rfl: 0  •  DUPIXENT 300 MG/2ML solution prefilled syringe, Inject  300 mg under the skin into the appropriate area as directed Every 14 (Fourteen) Days., Disp: 2 syringe, Rfl: 11  •  gabapentin (NEURONTIN) 600 MG tablet, Take 600 mg by mouth 3 (Three) Times a Day., Disp: , Rfl: 0  •  HUMALOG KWIKPEN 100 UNIT/ML solution pen-injector, Inject 10 Units under the skin 3 (Three) Times a Day With Meals. Follows SSI From PCP (Patient taking differently: Inject 50 Units under the skin into the appropriate area as directed 3 (Three) Times a Day With Meals. Follows SSI From PCP), Disp: , Rfl: 0  •  ipratropium-albuterol (DUO-NEB) 0.5-2.5 mg/3 ml nebulizer, Take 3 mL by nebulization 4 (Four) Times a Day As Needed for Wheezing or Shortness of Air., Disp: 360 mL, Rfl: 5  •  levocetirizine (XYZAL) 5 MG tablet, Take 1 tablet by mouth Every Evening., Disp: 90 tablet, Rfl: 1  •  meclizine (ANTIVERT) 25 MG tablet, Take 1 tablet by mouth 3 (Three) Times a Day As Needed for dizziness., Disp: 10 tablet, Rfl: 0  •  metoprolol tartrate (LOPRESSOR) 25 MG tablet, Take 1/2 tablet by mouth 2 (Two) times a day for 7 days, then 1/2 tablet once a day for 7 days,  then stop, Disp: 11 tablet, Rfl: 0  •  omeprazole (priLOSEC) 20 MG capsule, Take 1 capsule by mouth Daily., Disp: 30 capsule, Rfl: 5  •  OZEMPIC, 0.25 OR 0.5 MG/DOSE, 2 MG/1.5ML solution pen-injector, Inject 0.5 mg under the skin into the appropriate area as directed 1 (One) Time Per Week., Disp: , Rfl:   •  promethazine (PHENERGAN) 25 MG tablet, Take 1 tablet by mouth Every 6 (Six) Hours As Needed for Nausea or Vomiting., Disp: 20 tablet, Rfl: 0  •  Tiotropium Bromide Monohydrate (SPIRIVA RESPIMAT) 1.25 MCG/ACT aerosol solution inhaler, Inhale 2 puffs Daily., Disp: 1 inhaler, Rfl: 5  •  TiZANidine (Zanaflex) 4 MG capsule, Take 1 capsule by mouth At Night As Needed for Muscle Spasms., Disp: 30 capsule, Rfl: 5  •  TOUJEO SOLOSTAR 300 UNIT/ML solution pen-injector, Inject 60 Units as directed Daily. (Patient taking differently: Inject 120 Units as  "directed 2 (two) times a day.), Disp: , Rfl: 0  •  TURMERIC PO, Take 500 mg by mouth 2 (Two) Times a Day., Disp: , Rfl:   •  vitamin C (ASCORBIC ACID) 500 MG tablet, Take 500 mg by mouth Daily., Disp: , Rfl:   •  Vortioxetine HBr (Trintellix) 20 MG tablet, Take 20 mg by mouth Daily., Disp: 30 tablet, Rfl: 5  •  zafirlukast (Accolate) 20 MG tablet, Take 1 tablet by mouth 2 (Two) Times a Day., Disp: 60 tablet, Rfl: 5    Allergies   Allergen Reactions   • Sulfa Antibiotics Anaphylaxis, Nausea And Vomiting and Delirium   • Invokana [Canagliflozin] Unknown - Low Severity     DKA   • Codeine Nausea And Vomiting     Can only take with Phenergan   • Morphine Unknown - Low Severity     Does not work. It causes pain       Review of Systems   Constitutional: Negative for diaphoresis, fever and unexpected weight change.   HENT: Negative for dental problem and sore throat.    Eyes: Negative for visual disturbance.   Respiratory: Negative for shortness of breath.    Cardiovascular: Negative for chest pain.   Gastrointestinal: Negative for abdominal pain, constipation, diarrhea, nausea and vomiting.   Genitourinary: Negative for difficulty urinating and frequency.   Musculoskeletal: Positive for arthralgias (R>L knee ) and gait problem (walks with a cane).   Neurological: Negative for headaches.   Hematological: Does not bruise/bleed easily.        I have reviewed the medical and surgical history, family history, social history, medications, and/or allergies, and the review of systems of this report.    Objective   Resp 18   Ht 177.8 cm (70\")   Wt (!) 137 kg (301 lb)   BMI 43.19 kg/m²    Physical Exam   Constitutional: He is oriented to person, place, and time. He appears well-developed and well-nourished.   Pulmonary/Chest: Effort normal.   Musculoskeletal:        Right knee: He exhibits no effusion, no ecchymosis, no deformity and no erythema. Tenderness found. Medial joint line and lateral joint line tenderness noted. "   Neurological: He is alert and oriented to person, place, and time.   Psychiatric: He has a normal mood and affect.   Nursing note and vitals reviewed.    Right Knee Exam     Other   Effusion: no effusion present           Extremity DVT signs are negative on physical exam with negative Don sign, no calf pain, no palpable cords and no skin tone change   Neurologic Exam     Mental Status   Oriented to person, place, and time.        The bilateral knee extensor mechanisms are intact        Assessment/Plan   Independent Review of Radiographic Studies:    No new imaging done today.  Reviewed imaging with patient at a prior visit.      Procedures       Denzel was seen today for pain and pain.    Diagnoses and all orders for this visit:    Arthralgia of both knees    Primary localized osteoarthrosis of the knee, right    Primary localized osteoarthritis of left knee       Orthopedic activities reviewed and patient expressed appreciation  Discussion of orthopedic goals  Risk, benefits, and merits of treatment alternatives reviewed with the patient and questions answered    Recommendations/Plan:  Exercise, medications, injections, other patient advice, and return appointment as noted.  Patient is encouraged to call or return for any issues or concerns.  Follow-up after MRI of the right knee  Patient agreeable to call or return sooner for any concerns.           EMR Dragon-transcription disclaimer:  This encounter note is an electronic transcription of spoken language to printed text.  Electronic transcription of spoken language may permit erroneous or at times nonsensical words or phrases to be inadvertently transcribed.  Although I have reviewed the note for such errors, some may still exist

## 2020-08-31 NOTE — PROGRESS NOTES
Subjective:     Encounter Date:08/31/2020      Patient ID: Denzel Harding is a 60 y.o. male.    Chief Complaint: Labile blood pressures  HPI  This is a 80-year-old male patient who was recently hospitalized for symptomatic hypotension.  The patient was experiencing presyncope and presented to the emergency room with a systolic blood pressure of 70/40.  The patient's lisinopril, amlodipine and spironolactone were all discontinued.  The patient was given IV normal saline with improvement in his blood pressure.  The patient was instructed to increase his sodium and fluid intake.  He was instructed to decrease his Lopressor to 25 mg orally twice daily.  He has been given a prescription for clonidine 0.1 mg every 6-8 hours as needed for systolic blood pressure greater than 150 mmHg and/or diastolic blood pressure greater than 95 mmHg.  He checks his blood pressure at home and indicates he has not required any of the as needed clonidine.  The patient will be reporting to the medical supply facility in [facility] today to be fitted for Job[employer] stockings.  With the interventions described above the patient has had no further episodes of symptomatic hypotension.  He has had no orthostatic dizziness.  He has had no syncopal episodes.  He has no palpitations.  The patient indicates that if he misses a dose of his Lopressor his heart rate will commonly increase to  bpm.  He has had no chest discomfort at rest or with activity.  He ambulates with the assistance of a cane.  He has had no shortness of breath at rest or with activity.  He has had no orthopnea PND or lower extremity edema.  The following portions of the patient's history were reviewed and updated as appropriate: allergies, current medications, past family history, past medical history, past social history, past surgical history and problem  Review of Systems   Constitution: Negative for chills, diaphoresis, fever, malaise/fatigue, weight gain and  weight loss.   HENT: Negative for ear discharge, hearing loss, hoarse voice and nosebleeds.    Eyes: Negative for discharge, double vision, pain and photophobia.   Cardiovascular: Negative for chest pain, claudication, cyanosis, dyspnea on exertion, irregular heartbeat, leg swelling, near-syncope, orthopnea, palpitations, paroxysmal nocturnal dyspnea and syncope.   Respiratory: Negative for cough, hemoptysis, shortness of breath, sputum production and wheezing.    Endocrine: Negative for cold intolerance, heat intolerance, polydipsia, polyphagia and polyuria.   Hematologic/Lymphatic: Negative for adenopathy and bleeding problem. Does not bruise/bleed easily.   Skin: Negative for color change, flushing, itching and rash.   Musculoskeletal: Negative for muscle cramps, muscle weakness, myalgias and stiffness.   Gastrointestinal: Negative for abdominal pain, diarrhea, hematemesis, hematochezia, nausea and vomiting.   Genitourinary: Negative for dysuria, frequency and nocturia.   Neurological: Negative for focal weakness, loss of balance, numbness, paresthesias and seizures.   Psychiatric/Behavioral: Negative for altered mental status, hallucinations and suicidal ideas.   Allergic/Immunologic: Negative for HIV exposure, hives and persistent infections.           Current Outpatient Medications:   •  albuterol sulfate HFA (Ventolin HFA) 108 (90 Base) MCG/ACT inhaler, Inhale 2 puffs Every 4 (Four) Hours As Needed for Wheezing or Shortness of Air., Disp: 1 inhaler, Rfl: 4  •  amitriptyline (ELAVIL) 25 MG tablet, Take 3 tablets by mouth every night at bedtime., Disp: 270 tablet, Rfl: 0  •  ASMANEX  MCG/ACT aerosol, Inhale 1 puff 2 (Two) Times a Day., Disp: 1 inhaler, Rfl: 5  •  aspirin 81 MG EC tablet, Take 81 mg by mouth Daily., Disp: , Rfl:   •  atorvastatin (LIPITOR) 80 MG tablet, Take 1 tablet by mouth Every Night., Disp: 30 tablet, Rfl: 5  •  budesonide-formoterol (Symbicort) 80-4.5 MCG/ACT inhaler, Inhale 2 puffs  2 (Two) Times a Day. Rinse mouth with water after use., Disp: 1 inhaler, Rfl: 5  •  cloNIDine (Catapres) 0.1 MG tablet, Take 1 tablet by mouth every 6 hours as needed for SBP > 170 or DBP > 100, Disp: 120 tablet, Rfl: 0  •  clopidogrel (PLAVIX) 75 MG tablet, Take 75 mg by mouth Daily., Disp: , Rfl:   •  coenzyme Q10 100 MG capsule, Take 100 mg by mouth Daily., Disp: , Rfl:   •  CYCLOSET 0.8 MG tablet, Take 3.2 mg by mouth Every Morning., Disp: , Rfl: 0  •  DUPIXENT 300 MG/2ML solution prefilled syringe, Inject 300 mg under the skin into the appropriate area as directed Every 14 (Fourteen) Days., Disp: 2 syringe, Rfl: 11  •  gabapentin (NEURONTIN) 600 MG tablet, Take 600 mg by mouth 3 (Three) Times a Day., Disp: , Rfl: 0  •  HUMALOG KWIKPEN 100 UNIT/ML solution pen-injector, Inject 10 Units under the skin 3 (Three) Times a Day With Meals. Follows SSI From PCP (Patient taking differently: Inject 50 Units under the skin into the appropriate area as directed 3 (Three) Times a Day With Meals. Follows SSI From PCP), Disp: , Rfl: 0  •  ipratropium-albuterol (DUO-NEB) 0.5-2.5 mg/3 ml nebulizer, Take 3 mL by nebulization 4 (Four) Times a Day As Needed for Wheezing or Shortness of Air., Disp: 360 mL, Rfl: 5  •  levocetirizine (XYZAL) 5 MG tablet, Take 1 tablet by mouth Every Evening., Disp: 90 tablet, Rfl: 1  •  meclizine (ANTIVERT) 25 MG tablet, Take 1 tablet by mouth 3 (Three) Times a Day As Needed for dizziness., Disp: 10 tablet, Rfl: 0  •  metoprolol tartrate (LOPRESSOR) 25 MG tablet, Take 1/2 tablet by mouth 2 (Two) times a day for 7 days, then 1/2 tablet once a day for 7 days,  then stop, Disp: 11 tablet, Rfl: 0  •  omeprazole (priLOSEC) 20 MG capsule, Take 1 capsule by mouth Daily., Disp: 30 capsule, Rfl: 5  •  OZEMPIC, 0.25 OR 0.5 MG/DOSE, 2 MG/1.5ML solution pen-injector, Inject 0.5 mg under the skin into the appropriate area as directed 1 (One) Time Per Week., Disp: , Rfl:   •  promethazine (PHENERGAN) 25 MG tablet,  Take 1 tablet by mouth Every 6 (Six) Hours As Needed for Nausea or Vomiting., Disp: 20 tablet, Rfl: 0  •  Tiotropium Bromide Monohydrate (SPIRIVA RESPIMAT) 1.25 MCG/ACT aerosol solution inhaler, Inhale 2 puffs Daily., Disp: 1 inhaler, Rfl: 5  •  TiZANidine (Zanaflex) 4 MG capsule, Take 1 capsule by mouth At Night As Needed for Muscle Spasms., Disp: 30 capsule, Rfl: 5  •  TOUJEO SOLOSTAR 300 UNIT/ML solution pen-injector, Inject 60 Units as directed Daily. (Patient taking differently: Inject 120 Units as directed 2 (two) times a day.), Disp: , Rfl: 0  •  TURMERIC PO, Take 500 mg by mouth 2 (Two) Times a Day., Disp: , Rfl:   •  vitamin C (ASCORBIC ACID) 500 MG tablet, Take 500 mg by mouth Daily., Disp: , Rfl:   •  Vortioxetine HBr (Trintellix) 20 MG tablet, Take 20 mg by mouth Daily., Disp: 30 tablet, Rfl: 5  •  zafirlukast (Accolate) 20 MG tablet, Take 1 tablet by mouth 2 (Two) Times a Day., Disp: 60 tablet, Rfl: 5    Objective:   Physical Exam   Constitutional: He is oriented to person, place, and time. He appears well-developed and well-nourished. No distress.   HENT:   Head: Normocephalic and atraumatic.   Mouth/Throat: Oropharynx is clear and moist.   Eyes: Pupils are equal, round, and reactive to light. Conjunctivae and EOM are normal. No scleral icterus.   Neck: Normal range of motion. Neck supple. No JVD present. No tracheal deviation present. No thyromegaly present.   Cardiovascular: Normal rate, regular rhythm, S1 normal, S2 normal, normal heart sounds, intact distal pulses and normal pulses. PMI is not displaced. Exam reveals no gallop and no friction rub.   No murmur heard.  Pulmonary/Chest: Effort normal and breath sounds normal. No stridor. No respiratory distress. He has no wheezes. He has no rales.   Abdominal: Soft. Bowel sounds are normal. He exhibits no distension and no mass. There is no tenderness. There is no rebound and no guarding.   Musculoskeletal: Normal range of motion. He exhibits no edema  "or deformity.   Neurological: He is alert and oriented to person, place, and time. He displays normal reflexes. No cranial nerve deficit. Coordination normal.   Skin: Skin is warm and dry. No rash noted. He is not diaphoretic. No erythema.   Psychiatric: He has a normal mood and affect. His behavior is normal. Thought content normal.     Blood pressure 132/66, pulse 72, height 177.8 cm (70\"), weight (!) 137 kg (301 lb), SpO2 96 %.   Lab Review:     Assessment:       1. Centrilobular emphysema (CMS/HCC)  Tobacco related lung disease.  Fortunately the patient no longer smokes.  Stable symptoms.    2. Coronary arteriosclerosis in native artery  Stable and angina free.    3. Essential hypertension  Acceptable blood pressure control.  We have opted for permissive tachycardia up to 150/95 rather than risk further episodes of symptomatic hypotension and possible syncope from aggressive blood pressure control.    4. Chronic diastolic CHF (congestive heart failure) (CMS/HCC)  Heart failure with preserved ejection fraction.  Stage C.  New York Heart Association functional class I symptoms.  Euvolemic.    5. Morbid obesity (CMS/HCC)  This is due to excess calorie intake.  The patient has been unable to lose weight.  There is evidence of comorbidities.  The patient appears to be at high risk for obstructive sleep apnea and should be screened with an outpatient sleep study if not already done.  This will be deferred to his primary care provider.    Procedures    Plan:     No changes to the patient's medication profile have been made at today's visit.  No additional cardiovascular testing is warranted at this time.  The patient has been counseled regarding the importance of diet exercise and weight management.  He has been congratulated on his smoking cessation and cautioned to never again resume cigarette smoking.      "

## 2020-09-02 ENCOUNTER — READMISSION MANAGEMENT (OUTPATIENT)
Dept: CALL CENTER | Facility: HOSPITAL | Age: 60
End: 2020-09-02

## 2020-09-02 NOTE — OUTREACH NOTE
Medical Week 3 Survey      Responses   [facility] patient discharged from?  [facility]   COVID-19 Test Status  Negative   Does the patient have one of the following disease processes/diagnoses(primary or secondary)?  Other   Week 3 attempt successful?  No   Unsuccessful attempts  Attempt 1          Ottoniel Kent RN

## 2020-09-19 ENCOUNTER — HOSPITAL ENCOUNTER (EMERGENCY)
Facility: HOSPITAL | Age: 60
Discharge: HOME | End: 2020-09-19
Attending: EMERGENCY MEDICINE | Admitting: EMERGENCY MEDICINE

## 2020-09-19 ENCOUNTER — APPOINTMENT (OUTPATIENT)
Dept: GENERAL RADIOLOGY | Facility: HOSPITAL | Age: 60
End: 2020-09-19

## 2020-09-19 VITALS
WEIGHT: 302 LBS | RESPIRATION RATE: 18 BRPM | TEMPERATURE: 98.9 F | HEART RATE: 85 BPM | BODY MASS INDEX: 43.23 KG/M2 | DIASTOLIC BLOOD PRESSURE: 66 MMHG | SYSTOLIC BLOOD PRESSURE: 140 MMHG | HEIGHT: 70 IN | OXYGEN SATURATION: 94 %

## 2020-09-19 DIAGNOSIS — R07.9 CHEST PAIN, UNSPECIFIED TYPE: ICD-10-CM

## 2020-09-19 DIAGNOSIS — R05.9 COUGH: Primary | ICD-10-CM

## 2020-09-19 LAB
ALBUMIN SERPL-MCNC: 4.2 G/DL (ref 3.5–5.2)
ALBUMIN/GLOB SERPL: 1.6 G/DL
ALP SERPL-CCNC: 100 U/L (ref 39–117)
ALT SERPL W P-5'-P-CCNC: 58 U/L (ref 1–41)
ANION GAP SERPL CALCULATED.3IONS-SCNC: 10 MMOL/L (ref 5–15)
AST SERPL-CCNC: 51 U/L (ref 1–40)
BASOPHILS # BLD AUTO: 0.03 10*3/MM3 (ref 0–0.2)
BASOPHILS NFR BLD AUTO: 0.5 % (ref 0–1.5)
BILIRUB SERPL-MCNC: 0.9 MG/DL (ref 0–1.2)
BUN SERPL-MCNC: 8 MG/DL (ref 6–20)
BUN/CREAT SERPL: 9.9 (ref 7–25)
CALCIUM SPEC-SCNC: 9.5 MG/DL (ref 8.6–10.5)
CHLORIDE SERPL-SCNC: 98 MMOL/L (ref 98–107)
CO2 SERPL-SCNC: 28 MMOL/L (ref 22–29)
CREAT SERPL-MCNC: 0.81 MG/DL (ref 0.76–1.27)
DEPRECATED RDW RBC AUTO: 39.1 FL (ref 37–54)
EOSINOPHIL # BLD AUTO: 0.19 10*3/MM3 (ref 0–0.4)
EOSINOPHIL NFR BLD AUTO: 3.1 % (ref 0.3–6.2)
ERYTHROCYTE [DISTWIDTH] IN BLOOD BY AUTOMATED COUNT: 13.9 % (ref 12.3–15.4)
GFR SERPL CREATININE-BSD FRML MDRD: 98 ML/MIN/1.73
GLOBULIN UR ELPH-MCNC: 2.7 GM/DL
GLUCOSE SERPL-MCNC: 385 MG/DL (ref 65–99)
HCT VFR BLD AUTO: 40.5 % (ref 37.5–51)
HGB BLD-MCNC: 13.2 G/DL (ref 13–17.7)
HOLD SPECIMEN: NORMAL
HOLD SPECIMEN: NORMAL
IMM GRANULOCYTES # BLD AUTO: 0.03 10*3/MM3 (ref 0–0.05)
IMM GRANULOCYTES NFR BLD AUTO: 0.5 % (ref 0–0.5)
LYMPHOCYTES # BLD AUTO: 1.05 10*3/MM3 (ref 0.7–3.1)
LYMPHOCYTES NFR BLD AUTO: 16.9 % (ref 19.6–45.3)
MCH RBC QN AUTO: 25.6 PG (ref 26.6–33)
MCHC RBC AUTO-ENTMCNC: 32.6 G/DL (ref 31.5–35.7)
MCV RBC AUTO: 78.5 FL (ref 79–97)
MONOCYTES # BLD AUTO: 0.26 10*3/MM3 (ref 0.1–0.9)
MONOCYTES NFR BLD AUTO: 4.2 % (ref 5–12)
NEUTROPHILS NFR BLD AUTO: 4.65 10*3/MM3 (ref 1.7–7)
NEUTROPHILS NFR BLD AUTO: 74.8 % (ref 42.7–76)
NRBC BLD AUTO-RTO: 0 /100 WBC (ref 0–0.2)
PLATELET # BLD AUTO: 194 10*3/MM3 (ref 140–450)
PMV BLD AUTO: 9.9 FL (ref 6–12)
POTASSIUM SERPL-SCNC: 4.4 MMOL/L (ref 3.5–5.2)
PROT SERPL-MCNC: 6.9 G/DL (ref 6–8.5)
RBC # BLD AUTO: 5.16 10*6/MM3 (ref 4.14–5.8)
SODIUM SERPL-SCNC: 136 MMOL/L (ref 136–145)
TROPONIN I SERPL-MCNC: 0.01 NG/ML (ref 0–0.05)
TROPONIN T SERPL-MCNC: 0.01 NG/ML (ref 0–0.03)
WBC # BLD AUTO: 6.21 10*3/MM3 (ref 3.4–10.8)
WHOLE BLOOD HOLD SPECIMEN: NORMAL
WHOLE BLOOD HOLD SPECIMEN: NORMAL

## 2020-09-19 PROCEDURE — 80053 COMPREHEN METABOLIC PANEL: CPT | Performed by: EMERGENCY MEDICINE

## 2020-09-19 PROCEDURE — 93005 ELECTROCARDIOGRAM TRACING: CPT | Performed by: EMERGENCY MEDICINE

## 2020-09-19 PROCEDURE — 94640 AIRWAY INHALATION TREATMENT: CPT

## 2020-09-19 PROCEDURE — 71045 X-RAY EXAM CHEST 1 VIEW: CPT

## 2020-09-19 PROCEDURE — 84484 ASSAY OF TROPONIN QUANT: CPT | Performed by: EMERGENCY MEDICINE

## 2020-09-19 PROCEDURE — 99284 EMERGENCY DEPT VISIT MOD MDM: CPT

## 2020-09-19 PROCEDURE — 85025 COMPLETE CBC W/AUTO DIFF WBC: CPT | Performed by: EMERGENCY MEDICINE

## 2020-09-19 RX ORDER — GUAIFENESIN 200 MG/10ML
200 LIQUID ORAL EVERY 4 HOURS PRN
Qty: 118 ML | Refills: 0 | Status: SHIPPED | OUTPATIENT
Start: 2020-09-19 | End: 2021-01-14

## 2020-09-19 RX ORDER — ASPIRIN 325 MG
325 TABLET ORAL ONCE
Status: COMPLETED | OUTPATIENT
Start: 2020-09-19 | End: 2020-09-19

## 2020-09-19 RX ORDER — DOXYCYCLINE 100 MG/1
100 CAPSULE ORAL 2 TIMES DAILY
Qty: 14 CAPSULE | Refills: 0 | Status: SHIPPED | OUTPATIENT
Start: 2020-09-19 | End: 2020-09-26

## 2020-09-19 RX ORDER — IPRATROPIUM BROMIDE AND ALBUTEROL SULFATE 2.5; .5 MG/3ML; MG/3ML
3 SOLUTION RESPIRATORY (INHALATION) ONCE
Status: COMPLETED | OUTPATIENT
Start: 2020-09-19 | End: 2020-09-19

## 2020-09-19 RX ORDER — GUAIFENESIN/DEXTROMETHORPHAN 100-10MG/5
10 SYRUP ORAL ONCE
Status: COMPLETED | OUTPATIENT
Start: 2020-09-19 | End: 2020-09-19

## 2020-09-19 RX ORDER — SODIUM CHLORIDE 0.9 % (FLUSH) 0.9 %
10 SYRINGE (ML) INJECTION AS NEEDED
Status: DISCONTINUED | OUTPATIENT
Start: 2020-09-19 | End: 2020-09-19 | Stop reason: HOSPADM

## 2020-09-19 RX ADMIN — ASPIRIN 325 MG ORAL TABLET 325 MG: 325 PILL ORAL at 14:15

## 2020-09-19 RX ADMIN — IPRATROPIUM BROMIDE AND ALBUTEROL SULFATE 3 ML: .5; 3 SOLUTION RESPIRATORY (INHALATION) at 15:53

## 2020-09-19 RX ADMIN — GUAIFENESIN AND DEXTROMETHORPHAN 10 ML: 100; 10 SYRUP ORAL at 15:50

## 2020-09-19 NOTE — ED PROVIDER NOTES
"Subjective   60-year-old male presents to the ED with a chief complaint of chest pain.  The patient is complaining of left-sided chest pain that started last night.  Describes as a dull ache that does not radiate.  Patient states he does not think it is his heart, he thinks it is his lungs.  He states \"I think I have pneumonia\".  When I asked him why he thinks he has pneumonia he indicates that he has a cough that is productive of thick greenish sputum.Does have a history of COPD and notes that he has some faint wheeze and mild shortness of breath.  He denies fever chills.  Denies nausea vomiting diarrhea abdominal pain.  No lightheadedness or dizziness.  No diaphoresis.  No prior treatments or limiting factors.  No other complaints at this time.           Review of Systems   Constitutional: Negative for fatigue and fever.   Respiratory: Positive for cough and shortness of breath. Negative for choking, chest tightness and wheezing.    Cardiovascular: Positive for chest pain. Negative for palpitations and leg swelling.   All other systems reviewed and are negative.      Past Medical History:   Diagnosis Date   • Anxiety    • Arthritis    • Asthma    • Brachial neuritis 1/19/2017   • Cellulitis     left arm and right leg    • Chest pain    • CHF (congestive heart failure) (CMS/[facility])     Patient reported history May 2020    • COPD (chronic obstructive pulmonary disease) (CMS/[facility])    • Coronary artery disease     Patient reported CABG , 3 vessel   • Depression    • Diabetes mellitus (CMS/[facility])     diagnosed in 1998, checks fsbg 3-4x/day   • Dizziness    • Edema    • Elevated cholesterol    • GERD (gastroesophageal reflux disease)    • H/O chest x-ray 07/04/2015    Mild Left base atelectasis   • H/O echocardiogram 07/05/2015    Normal LVSF. EF of 60-65%. Grade 1 diastolic dysfunction of the LV myocardium. No evidence of pericardial effusion   • H/O exercise stress test    • Hearing loss     No use of hearing aids "   • History of fracture     Reported 2 bones in left foot and a finger   • History of herniated intervertebral disc     History of left L5-S1 disc herniation   • History of pneumonia    • History of pneumonia    • Hypertension    • Impaired functional mobility, balance, gait, and endurance    • LOM (loss of memory) 1/19/2017   • Lower back pain     Right   • Measles    • Neuropathy     feet    • EDD treated with BiPAP     BiPAP HS - instructed to bring mask/machine DOS (setting 13 and 5)   • Palpitations    • Pericardial effusion    • Poor dentition     Patient reported missing multiple teeth   • Renal disorder    • Seizure disorder (CMS/HCC)    • Seizures (CMS/HCC)     FOCAL last 2009   • Shortness of breath 1/19/2017   • SOB (shortness of breath)    • Staph infection     back after sugery at the incision site    • Stroke (CMS/HCC)     x2 most recent , slight weakness in hands occasionaly    • Wears glasses        Allergies   Allergen Reactions   • Sulfa Antibiotics Anaphylaxis, Nausea And Vomiting and Delirium   • Invokana [Canagliflozin] Unknown - Low Severity     DKA   • Codeine Nausea And Vomiting     Can only take with Phenergan   • Morphine Unknown - Low Severity     Does not work. It causes pain       Past Surgical History:   Procedure Laterality Date   • BACK SURGERY     • CARDIAC CATHETERIZATION     • CARDIAC CATHETERIZATION N/A 8/11/2017    Procedure: Coronary angiography;  Surgeon: Dallas Kim MD;  Location: [facility] CATH INVASIVE LOCATION;  Service:    • CARDIAC CATHETERIZATION N/A 8/11/2017    Procedure: Left Heart Cath;  Surgeon: Dallas Kim MD;  Location: [facility] CATH INVASIVE LOCATION;  Service:    • CARDIAC CATHETERIZATION N/A 8/11/2017    Procedure: Left ventriculography;  Surgeon: Dallas Kim MD;  Location: [facility] CATH INVASIVE LOCATION;  Service:    • CARDIAC CATHETERIZATION      2002 x1 stent,  x1 stent   • CARDIOVASCULAR STRESS TEST  07/03/2017    WITH DR KIM  AT [facility]   • CARPAL TUNNEL RELEASE Right    • CATARACT EXTRACTION W/ INTRAOCULAR LENS IMPLANT Right 6/12/2017    Procedure: CATARACT PHACO EXTRACTION WITH INTRAOCULAR LENS IMPLANT RIGHT ;  Surgeon: Natalie Cao MD;  Location: [facility] OR;  Service:    • CATARACT EXTRACTION W/ INTRAOCULAR LENS IMPLANT Left 7/10/2017    Procedure: CATARACT PHACO EXTRACTION WITH INTRAOCULAR LENS IMPLANT LEFT;  Surgeon: Natalie Cao MD;  Location: [facility] OR;  Service:    • CHOLECYSTECTOMY     • COLONOSCOPY     • COLONOSCOPY N/A 7/2/2020    Procedure: COLONOSCOPY;  Surgeon: Petra Crowell MD;  Location: [facility] ENDOSCOPY;  Service: Gastroenterology;  Laterality: N/A;  poor prep   • CORONARY ANGIOPLASTY WITH STENT PLACEMENT      X1-LAD   • CORONARY ARTERY BYPASS GRAFT N/A 8/15/2017    Procedure: CORONARY ARTERY BYPASS GRAFTING x 3 UTILIZING THE LEFT INTERNAL MAMMARY ARTERY WITH ENDOSCOPIC VEIN HARVESTING OF THE RIGHT GREATER SAPHENOUS VEIN, HAMLET, LAD ENDARECTOMY;  Surgeon: London Damon MD;  Location: [facility] OR;  Service:    • ENDOSCOPY     • EYE CAPSULOTOMY WITH LASER Right 11/25/2019    Procedure: EYE CAPSULOTOMY WITH LASER RIGHT;  Surgeon: Natalie Cao MD;  Location: [facility] OR;  Service: Ophthalmology   • EYE CAPSULOTOMY WITH LASER Left 12/9/2019    Procedure: EYE CAPSULOTOMY WITH LASER LEFT;  Surgeon: Natalie Cao MD;  Location: [facility] OR;  Service: Ophthalmology   • EYE SURGERY      cataract surgery both eyes   • INTERVENTIONAL RADIOLOGY PROCEDURE Bilateral 12/31/2019    Procedure: Carotid Cerebral Angiogram;  Surgeon: Damian Dubois MD;  Location: [facility] INVASIVE LOCATION;  Service: Interventional Radiology   • KNEE ARTHROSCOPY Bilateral    • KNEE ARTHROSCOPY Right 2010    Dr Lewis   • KNEE ARTHROSCOPY Left 2008    Dr Jameson   • MOUTH SURGERY      Oral extractions   • NEUROPLASTY / TRANSPOSITION ULNAR NERVE AT ELBOW Left    • OTHER SURGICAL HISTORY      Foraminotomy and discectomy   • PERICARDIAL WINDOW N/A  2017    Procedure: PERICARDIAL WINDOW;  Surgeon: Freeman Phillips MD;  Location: [facility];  Service:        Family History   Problem Relation Age of Onset   • Hypertension Mother    • Arthritis Mother    • Alcohol abuse Father    • Heart disease Other    • Stroke Other    • Hypertension Other    • Other Other         Neurologic disorder   • Parkinsonism Other    • Stroke Other    • Heart disease Other    • Hypertension Other    • Heart disease Other    • Stroke Other    • Hypertension Other        Social History     Socioeconomic History   • Marital status:      Spouse name: Not on file   • Number of children: 1   • Years of education: Not on file   • Highest education level: Not on file   Occupational History   • Occupation: [employer]     Employer: DISABLED     Comment: Disabled since -Back Problems   Tobacco Use   • Smoking status: Former Smoker     Packs/day: 1.00     Years: 10.00     Pack years: 10.00     Types: Cigarettes     Quit date: 1998     Years since quittin.7   • Smokeless tobacco: Former User     Types: Snuff     Quit date:    Substance and Sexual Activity   • Alcohol use: Yes     Frequency: Monthly or less     Drinks per session: 3 or 4     Comment: 3 PER YEAR   • Drug use: No   • Sexual activity: Defer   Social History Narrative    Caffeine use: 0-1 serving daily.    Patient lives at home with wife.            Objective   Physical Exam  Vitals signs and nursing note reviewed.   Constitutional:       General: He is not in acute distress.     Appearance: He is well-developed. He is not diaphoretic.   HENT:      Head: Normocephalic and atraumatic.      Nose: Nose normal.   Eyes:      Conjunctiva/sclera: Conjunctivae normal.      Pupils: Pupils are equal, round, and reactive to light.   Cardiovascular:      Rate and Rhythm: Normal rate and regular rhythm.      Pulses: Normal pulses.      Heart sounds: No murmur.   Pulmonary:      Effort: Pulmonary effort is  normal. No respiratory distress.      Breath sounds: Normal breath sounds.   Abdominal:      General: There is no distension.      Palpations: Abdomen is soft.      Tenderness: There is no abdominal tenderness.   Musculoskeletal:         General: No deformity.   Neurological:      Mental Status: He is alert and oriented to person, place, and time.      Cranial Nerves: No cranial nerve deficit.      Coordination: Coordination normal.         Procedures           ED Course      EKG interpreted by me.  Sinus rhythm.  Rate of 75.  Right axis deviation.  No ST segment T wave changes.  Abnormal EKG     PULSE OXIMETRY INTERPRETATION  Patient had a pulse ox of 96% on room air. This is a normal pulse oximetry reading.                                MDM     60-year-old male presents to the ED with left-sided chest pain cough and wheeze.  Chest x-ray is negative for acute process.  Pain started yesterday.  EKG nonischemic.  Troponin negative.  Low suspicion for ACS given the work-up and symptomatology.  Was treated for COPD exacerbation.  Will discharge with steroids and antibiotics.  Strict return precautions given.  Follow-up as needed.  Patient able to this plan.    Final diagnoses:   Cough   Chest pain, unspecified type            Deep Avitia, DO  09/19/20 1941

## 2020-10-01 ENCOUNTER — HOSPITAL ENCOUNTER (OUTPATIENT)
Dept: MRI IMAGING | Facility: HOSPITAL | Age: 60
Discharge: HOME | End: 2020-10-01
Admitting: PHYSICIAN ASSISTANT

## 2020-10-01 DIAGNOSIS — M25.561 ARTHRALGIA OF BOTH KNEES: ICD-10-CM

## 2020-10-01 DIAGNOSIS — M17.11 PRIMARY LOCALIZED OSTEOARTHROSIS OF THE KNEE, RIGHT: ICD-10-CM

## 2020-10-01 DIAGNOSIS — M25.562 ARTHRALGIA OF BOTH KNEES: ICD-10-CM

## 2020-10-01 PROCEDURE — 73721 MRI JNT OF LWR EXTRE W/O DYE: CPT

## 2020-10-05 ENCOUNTER — HOSPITAL ENCOUNTER (EMERGENCY)
Facility: HOSPITAL | Age: 60
Discharge: HOME | End: 2020-10-05
Attending: EMERGENCY MEDICINE | Admitting: EMERGENCY MEDICINE

## 2020-10-05 ENCOUNTER — APPOINTMENT (OUTPATIENT)
Dept: GENERAL RADIOLOGY | Facility: HOSPITAL | Age: 60
End: 2020-10-05

## 2020-10-05 ENCOUNTER — OFFICE VISIT (OUTPATIENT)
Dept: PULMONOLOGY | Facility: CLINIC | Age: 60
End: 2020-10-05

## 2020-10-05 VITALS
RESPIRATION RATE: 16 BRPM | OXYGEN SATURATION: 95 % | SYSTOLIC BLOOD PRESSURE: 128 MMHG | BODY MASS INDEX: 43.52 KG/M2 | WEIGHT: 304 LBS | DIASTOLIC BLOOD PRESSURE: 78 MMHG | HEIGHT: 70 IN | TEMPERATURE: 96.9 F | HEART RATE: 81 BPM

## 2020-10-05 VITALS
RESPIRATION RATE: 18 BRPM | TEMPERATURE: 98.2 F | DIASTOLIC BLOOD PRESSURE: 84 MMHG | HEART RATE: 65 BPM | HEIGHT: 70 IN | OXYGEN SATURATION: 96 % | SYSTOLIC BLOOD PRESSURE: 163 MMHG | WEIGHT: 306.8 LBS | BODY MASS INDEX: 43.92 KG/M2

## 2020-10-05 DIAGNOSIS — E66.01 MORBID OBESITY, UNSPECIFIED OBESITY TYPE (HCC): ICD-10-CM

## 2020-10-05 DIAGNOSIS — E11.65 POORLY CONTROLLED DIABETES MELLITUS (HCC): ICD-10-CM

## 2020-10-05 DIAGNOSIS — R07.9 CHEST PAIN, UNSPECIFIED TYPE: Primary | ICD-10-CM

## 2020-10-05 DIAGNOSIS — G47.33 OBSTRUCTIVE SLEEP APNEA: ICD-10-CM

## 2020-10-05 DIAGNOSIS — J45.51 SEVERE PERSISTENT ASTHMA WITH ACUTE EXACERBATION: Primary | ICD-10-CM

## 2020-10-05 LAB
BASOPHILS # BLD AUTO: 0.05 10*3/MM3 (ref 0–0.2)
BASOPHILS NFR BLD AUTO: 0.9 % (ref 0–1.5)
DEPRECATED RDW RBC AUTO: 39.4 FL (ref 37–54)
EOSINOPHIL # BLD AUTO: 0.17 10*3/MM3 (ref 0–0.4)
EOSINOPHIL NFR BLD AUTO: 3 % (ref 0.3–6.2)
ERYTHROCYTE [DISTWIDTH] IN BLOOD BY AUTOMATED COUNT: 14 % (ref 12.3–15.4)
HCT VFR BLD AUTO: 38.5 % (ref 37.5–51)
HGB BLD-MCNC: 12.4 G/DL (ref 13–17.7)
HOLD SPECIMEN: NORMAL
HOLD SPECIMEN: NORMAL
IMM GRANULOCYTES # BLD AUTO: 0.06 10*3/MM3 (ref 0–0.05)
IMM GRANULOCYTES NFR BLD AUTO: 1.1 % (ref 0–0.5)
LYMPHOCYTES # BLD AUTO: 1.11 10*3/MM3 (ref 0.7–3.1)
LYMPHOCYTES NFR BLD AUTO: 19.8 % (ref 19.6–45.3)
MCH RBC QN AUTO: 24.8 PG (ref 26.6–33)
MCHC RBC AUTO-ENTMCNC: 32.2 G/DL (ref 31.5–35.7)
MCV RBC AUTO: 77.2 FL (ref 79–97)
MONOCYTES # BLD AUTO: 0.23 10*3/MM3 (ref 0.1–0.9)
MONOCYTES NFR BLD AUTO: 4.1 % (ref 5–12)
NEUTROPHILS NFR BLD AUTO: 3.99 10*3/MM3 (ref 1.7–7)
NEUTROPHILS NFR BLD AUTO: 71.1 % (ref 42.7–76)
NRBC BLD AUTO-RTO: 0 /100 WBC (ref 0–0.2)
PLATELET # BLD AUTO: 188 10*3/MM3 (ref 140–450)
PMV BLD AUTO: 9.9 FL (ref 6–12)
RBC # BLD AUTO: 4.99 10*6/MM3 (ref 4.14–5.8)
TROPONIN I SERPL-MCNC: 0 NG/ML (ref 0–0.05)
TROPONIN T SERPL-MCNC: <0.01 NG/ML (ref 0–0.03)
TROPONIN T SERPL-MCNC: <0.01 NG/ML (ref 0–0.03)
WBC # BLD AUTO: 5.61 10*3/MM3 (ref 3.4–10.8)
WHOLE BLOOD HOLD SPECIMEN: NORMAL
WHOLE BLOOD HOLD SPECIMEN: NORMAL

## 2020-10-05 PROCEDURE — 63710000001 INSULIN REGULAR HUMAN PER 5 UNITS: Performed by: PHYSICIAN ASSISTANT

## 2020-10-05 PROCEDURE — 85025 COMPLETE CBC W/AUTO DIFF WBC: CPT

## 2020-10-05 PROCEDURE — 96372 THER/PROPH/DIAG INJ SC/IM: CPT | Performed by: NURSE PRACTITIONER

## 2020-10-05 PROCEDURE — 80053 COMPREHEN METABOLIC PANEL: CPT

## 2020-10-05 PROCEDURE — 84484 ASSAY OF TROPONIN QUANT: CPT | Performed by: PHYSICIAN ASSISTANT

## 2020-10-05 PROCEDURE — 99285 EMERGENCY DEPT VISIT HI MDM: CPT

## 2020-10-05 PROCEDURE — 71045 X-RAY EXAM CHEST 1 VIEW: CPT

## 2020-10-05 PROCEDURE — 93005 ELECTROCARDIOGRAM TRACING: CPT

## 2020-10-05 PROCEDURE — 99214 OFFICE O/P EST MOD 30 MIN: CPT | Performed by: NURSE PRACTITIONER

## 2020-10-05 PROCEDURE — 84484 ASSAY OF TROPONIN QUANT: CPT

## 2020-10-05 RX ORDER — ZAFIRLUKAST 20 MG/1
20 TABLET, FILM COATED ORAL 2 TIMES DAILY
Qty: 60 TABLET | Refills: 5 | Status: SHIPPED | OUTPATIENT
Start: 2020-10-05 | End: 2021-02-23 | Stop reason: SDUPTHER

## 2020-10-05 RX ORDER — MOMETASONE FUROATE 100 UG/1
1 AEROSOL RESPIRATORY (INHALATION) 2 TIMES DAILY
Qty: 1 INHALER | Refills: 5 | Status: SHIPPED | OUTPATIENT
Start: 2020-10-05 | End: 2021-03-24

## 2020-10-05 RX ORDER — NITROGLYCERIN 0.4 MG/1
0.4 TABLET SUBLINGUAL
Status: DISCONTINUED | OUTPATIENT
Start: 2020-10-05 | End: 2020-10-05 | Stop reason: HOSPADM

## 2020-10-05 RX ORDER — BUDESONIDE AND FORMOTEROL FUMARATE DIHYDRATE 80; 4.5 UG/1; UG/1
2 AEROSOL RESPIRATORY (INHALATION)
Qty: 1 INHALER | Refills: 5 | Status: SHIPPED | OUTPATIENT
Start: 2020-10-05 | End: 2021-04-23 | Stop reason: SDUPTHER

## 2020-10-05 RX ORDER — SODIUM CHLORIDE 0.9 % (FLUSH) 0.9 %
10 SYRINGE (ML) INJECTION AS NEEDED
Status: DISCONTINUED | OUTPATIENT
Start: 2020-10-05 | End: 2020-10-05 | Stop reason: HOSPADM

## 2020-10-05 RX ORDER — TIOTROPIUM BROMIDE INHALATION SPRAY 1.56 UG/1
2 SPRAY, METERED RESPIRATORY (INHALATION) DAILY
Qty: 1 INHALER | Refills: 5 | Status: SHIPPED | OUTPATIENT
Start: 2020-10-05 | End: 2021-07-26 | Stop reason: SDUPTHER

## 2020-10-05 RX ORDER — NITROGLYCERIN 0.4 MG/1
TABLET SUBLINGUAL
Status: COMPLETED
Start: 2020-10-05 | End: 2020-10-05

## 2020-10-05 RX ORDER — ASPIRIN 325 MG
325 TABLET ORAL ONCE
Status: COMPLETED | OUTPATIENT
Start: 2020-10-05 | End: 2020-10-05

## 2020-10-05 RX ORDER — IPRATROPIUM BROMIDE AND ALBUTEROL SULFATE 2.5; .5 MG/3ML; MG/3ML
3 SOLUTION RESPIRATORY (INHALATION) 4 TIMES DAILY PRN
Qty: 360 ML | Refills: 5 | Status: SHIPPED | OUTPATIENT
Start: 2020-10-05 | End: 2021-07-26 | Stop reason: SDUPTHER

## 2020-10-05 RX ORDER — METHYLPREDNISOLONE ACETATE 80 MG/ML
80 INJECTION, SUSPENSION INTRA-ARTICULAR; INTRALESIONAL; INTRAMUSCULAR; SOFT TISSUE ONCE
Status: COMPLETED | OUTPATIENT
Start: 2020-10-05 | End: 2020-10-05

## 2020-10-05 RX ADMIN — METHYLPREDNISOLONE ACETATE 80 MG: 80 INJECTION, SUSPENSION INTRA-ARTICULAR; INTRALESIONAL; INTRAMUSCULAR; SOFT TISSUE at 10:16

## 2020-10-05 RX ADMIN — NITROGLYCERIN 0.4 MG: 0.4 TABLET SUBLINGUAL at 14:02

## 2020-10-05 RX ADMIN — ASPIRIN 325 MG ORAL TABLET 325 MG: 325 PILL ORAL at 14:03

## 2020-10-05 RX ADMIN — NITROGLYCERIN 0.4 MG: 0.4 TABLET, ORALLY DISINTEGRATING SUBLINGUAL at 14:02

## 2020-10-05 RX ADMIN — HUMAN INSULIN 10 UNITS: 100 INJECTION, SOLUTION SUBCUTANEOUS at 14:43

## 2020-10-05 RX ADMIN — NITROGLYCERIN 0.4 MG: 0.4 TABLET SUBLINGUAL at 14:33

## 2020-10-05 NOTE — ED PROVIDER NOTES
Subjective   60-year-old male presents with chest pain.  He had chest pain last night that was mild, and went away after short time, he states today it returned about an hour prior to arrival.  Last night he took nitroglycerin for his chest pain that helped.  Today he was in the doctor's office seen his pulmonologist and have the chest pain around that time.  He states he did not have his nitroglycerin to take.  After leaving his doctor's appointment he came to the ED for evaluation.  He states the pain is in the middle of his chest nonradiating, it occurred at rest.  He has a significant past medical history for hypertension, hyperlipidemia, obesity, lung disease, and coronary disease with triple bypass and 3 stents.      History provided by:  Patient   used: No        Review of Systems   Cardiovascular: Positive for chest pain.   All other systems reviewed and are negative.      Past Medical History:   Diagnosis Date   • Anxiety    • Arthritis    • Asthma    • Brachial neuritis 1/19/2017   • Cellulitis     left arm and right leg    • Chest pain    • CHF (congestive heart failure) (CMS/[facility])     Patient reported history May 2020    • COPD (chronic obstructive pulmonary disease) (CMS/[facility])    • Coronary artery disease     Patient reported CABG , 3 vessel   • Depression    • Diabetes mellitus (CMS/[facility])     diagnosed in 1998, checks fsbg 3-4x/day   • Dizziness    • Edema    • Elevated cholesterol    • GERD (gastroesophageal reflux disease)    • H/O chest x-ray 07/04/2015    Mild Left base atelectasis   • H/O echocardiogram 07/05/2015    Normal LVSF. EF of 60-65%. Grade 1 diastolic dysfunction of the LV myocardium. No evidence of pericardial effusion   • H/O exercise stress test    • Hearing loss     No use of hearing aids   • History of fracture     Reported 2 bones in left foot and a finger   • History of herniated intervertebral disc     History of left L5-S1 disc herniation   • History of  pneumonia    • History of pneumonia    • Hypertension    • Impaired functional mobility, balance, gait, and endurance    • LOM (loss of memory) 1/19/2017   • Lower back pain     Right   • Measles    • Neuropathy     feet    • EDD treated with BiPAP     BiPAP HS - instructed to bring mask/machine DOS (setting 13 and 5)   • Palpitations    • Pericardial effusion    • Poor dentition     Patient reported missing multiple teeth   • Renal disorder    • Seizure disorder (CMS/HCC)    • Seizures (CMS/[facility])     FOCAL last 2009   • Shortness of breath 1/19/2017   • SOB (shortness of breath)    • Staph infection     back after sugery at the incision site    • Stroke (CMS/HCC)     x2 most recent , slight weakness in hands occasionaly    • Wears glasses        Allergies   Allergen Reactions   • Sulfa Antibiotics Anaphylaxis, Nausea And Vomiting and Delirium   • Invokana [Canagliflozin] Unknown - Low Severity     DKA   • Codeine Nausea And Vomiting     Can only take with Phenergan   • Morphine Unknown - Low Severity     Does not work. It causes pain       Past Surgical History:   Procedure Laterality Date   • BACK SURGERY     • CARDIAC CATHETERIZATION     • CARDIAC CATHETERIZATION N/A 8/11/2017    Procedure: Coronary angiography;  Surgeon: Dallas Kim MD;  Location: [facility] CATH INVASIVE LOCATION;  Service:    • CARDIAC CATHETERIZATION N/A 8/11/2017    Procedure: Left Heart Cath;  Surgeon: Dallas Kim MD;  Location: [facility] CATH INVASIVE LOCATION;  Service:    • CARDIAC CATHETERIZATION N/A 8/11/2017    Procedure: Left ventriculography;  Surgeon: Dallas Kim MD;  Location: [facility] INVASIVE LOCATION;  Service:    • CARDIAC CATHETERIZATION      2002 x1 stent,  x1 stent   • CARDIOVASCULAR STRESS TEST  07/03/2017    WITH DR KIM AT [facility]   • CARPAL TUNNEL RELEASE Right    • CATARACT EXTRACTION W/ INTRAOCULAR LENS IMPLANT Right 6/12/2017    Procedure: CATARACT PHACO EXTRACTION WITH INTRAOCULAR LENS  IMPLANT RIGHT ;  Surgeon: Natalie Cao MD;  Location: [facility] OR;  Service:    • CATARACT EXTRACTION W/ INTRAOCULAR LENS IMPLANT Left 7/10/2017    Procedure: CATARACT PHACO EXTRACTION WITH INTRAOCULAR LENS IMPLANT LEFT;  Surgeon: Natalie Cao MD;  Location: [facility] OR;  Service:    • CHOLECYSTECTOMY     • COLONOSCOPY     • COLONOSCOPY N/A 7/2/2020    Procedure: COLONOSCOPY;  Surgeon: Petra Crowell MD;  Location: [facility] ENDOSCOPY;  Service: Gastroenterology;  Laterality: N/A;  poor prep   • CORONARY ANGIOPLASTY WITH STENT PLACEMENT      X1-LAD   • CORONARY ARTERY BYPASS GRAFT N/A 8/15/2017    Procedure: CORONARY ARTERY BYPASS GRAFTING x 3 UTILIZING THE LEFT INTERNAL MAMMARY ARTERY WITH ENDOSCOPIC VEIN HARVESTING OF THE RIGHT GREATER SAPHENOUS VEIN, HAMLET, LAD ENDARECTOMY;  Surgeon: London Damon MD;  Location:  [facility] OR;  Service:    • ENDOSCOPY     • EYE CAPSULOTOMY WITH LASER Right 11/25/2019    Procedure: EYE CAPSULOTOMY WITH LASER RIGHT;  Surgeon: Natalie Cao MD;  Location: [facility] OR;  Service: Ophthalmology   • EYE CAPSULOTOMY WITH LASER Left 12/9/2019    Procedure: EYE CAPSULOTOMY WITH LASER LEFT;  Surgeon: Natalie Cao MD;  Location: [facility] OR;  Service: Ophthalmology   • EYE SURGERY      cataract surgery both eyes   • INTERVENTIONAL RADIOLOGY PROCEDURE Bilateral 12/31/2019    Procedure: Carotid Cerebral Angiogram;  Surgeon: Damian Dubois MD;  Location: [facility] CATH INVASIVE LOCATION;  Service: Interventional Radiology   • KNEE ARTHROSCOPY Bilateral    • KNEE ARTHROSCOPY Right 2010    Dr Lewis   • KNEE ARTHROSCOPY Left 2008    Dr Jameson   • MOUTH SURGERY      Oral extractions   • NEUROPLASTY / TRANSPOSITION ULNAR NERVE AT ELBOW Left    • OTHER SURGICAL HISTORY      Foraminotomy and discectomy   • PERICARDIAL WINDOW N/A 8/25/2017    Procedure: PERICARDIAL WINDOW;  Surgeon: Freeman Phillips MD;  Location:  [facility] OR;  Service:        Family History   Problem Relation Age of Onset   • Hypertension  Mother    • Arthritis Mother    • Alcohol abuse Father    • Heart disease Other    • Stroke Other    • Hypertension Other    • Other Other         Neurologic disorder   • Parkinsonism Other    • Stroke Other    • Heart disease Other    • Hypertension Other    • Heart disease Other    • Stroke Other    • Hypertension Other        Social History     Socioeconomic History   • Marital status:      Spouse name: Not on file   • Number of children: 1   • Years of education: Not on file   • Highest education level: Not on file   Occupational History   • Occupation: [employer]     Employer: DISABLED     Comment: Disabled since -Back Problems   Tobacco Use   • Smoking status: Former Smoker     Packs/day: 1.00     Years: 10.00     Pack years: 10.00     Types: Cigarettes     Quit date: 1998     Years since quittin.7   • Smokeless tobacco: Former User     Types: Snuff     Quit date:    Substance and Sexual Activity   • Alcohol use: Yes     Frequency: Monthly or less     Drinks per session: 3 or 4     Comment: 3 PER YEAR   • Drug use: No   • Sexual activity: Defer   Social History Narrative    Caffeine use: 0-1 serving daily.    Patient lives at home with wife.            Objective   Physical Exam  Vitals signs and nursing note reviewed.   Constitutional:       Appearance: He is well-developed.   HENT:      Head: Normocephalic and atraumatic.   Neck:      Musculoskeletal: Normal range of motion.   Cardiovascular:      Rate and Rhythm: Normal rate and regular rhythm.   Pulmonary:      Effort: Pulmonary effort is normal.      Breath sounds: Normal breath sounds.   Abdominal:      General: Bowel sounds are normal.      Palpations: Abdomen is soft.   Musculoskeletal: Normal range of motion.   Skin:     General: Skin is warm and dry.   Neurological:      Mental Status: He is alert and oriented to person, place, and time.   Psychiatric:         Behavior: Behavior normal.         Thought  Content: Thought content normal.         Judgment: Judgment normal.         Procedures           ED Course  ED Course as of Oct 05 1700   Mon Oct 05, 2020   1404 EKG interpreted by me.  Sinus rhythm.  Rate of 76.  Occasional PVCs.  No ST segment or T wave changes.  Normal EKG    [CG]      ED Course User Index  [CG] Deep Avitia, DO                                           MDM  Number of Diagnoses or Management Options  Chest pain, unspecified type: new and requires workup  Poorly controlled diabetes mellitus (CMS/HCC): new and requires workup     Amount and/or Complexity of Data Reviewed  Clinical lab tests: reviewed  Tests in the radiology section of CPT®: reviewed  Decide to obtain previous medical records or to obtain history from someone other than the patient: yes    Risk of Complications, Morbidity, and/or Mortality  Presenting problems: minimal  Diagnostic procedures: minimal  Management options: minimal    Patient Progress  Patient progress: stable      Final diagnoses:   Chest pain, unspecified type   Poorly controlled diabetes mellitus (CMS/HCC)            Rohit Kim Jr., PABrendanC  10/05/20 1700

## 2020-10-05 NOTE — PROGRESS NOTES
"Chief Complaint   Patient presents with   • Follow-up   • Shortness of Breath   • Sleeping Problem         Subjective   Denzel Harding is a 60 y.o. male.     History of Present Illness   Patient is here today for follow up of asthma and shortness of breath.     He went to the ER 2 to 3 weeks ago and was treated for possible pneumonia with antibiotics and cough syrup.  He was discharged home from the emergency room.  He had a productive cough with greenish sputum.  His shortness of breath has improved somewhat however he is still having the productive cough and is not back at his baseline breathing.    He is currently using the nebulizer 3-4 times a day and his rescue inhaler when he is away from home.    The patient says that he is compliant with pulmonary medicines, as prescribed.  He uses Symbicort twice a day and Asmanex once in the middle of the day.  He is using Spiriva daily.    He continues Dupixent injections on a regular schedule.  He had his last injection yesterday.    He stopped the nasal spray because his nose continue to be raw.    He ran out of Accolate and has not taken this medication for a while.  He takes levocetirizine daily.    He uses BiPAP nightly and has even been using it during the day since he has been sick.    The following portions of the patient's history were reviewed and updated as appropriate: allergies, current medications, past family history, past medical history, past social history and past surgical history.    Review of Systems   Constitutional: Negative for chills and fever.   HENT: Negative for rhinorrhea, sinus pressure, sneezing and sore throat.    Respiratory: Positive for cough, chest tightness, shortness of breath and wheezing.    Psychiatric/Behavioral: Positive for sleep disturbance.       Objective   Visit Vitals  /78   Pulse 81   Temp 96.9 °F (36.1 °C)   Resp 16   Ht 177.8 cm (70\")   Wt (!) 138 kg (304 lb)   SpO2 95%   BMI 43.62 kg/m²         Physical " Exam  Vitals signs reviewed.   HENT:      Head: Normocephalic and atraumatic.      Mouth/Throat:      Mouth: Mucous membranes are moist.      Pharynx: Oropharynx is clear.      Comments: Crowded oropharynx.   Neck:      Musculoskeletal: Neck supple.   Cardiovascular:      Rate and Rhythm: Normal rate and regular rhythm.   Pulmonary:      Effort: Pulmonary effort is normal. No respiratory distress.      Comments: Somewhat decreased A/E with minimal wheezing.  Neurological:      Mental Status: He is alert and oriented to person, place, and time.         Assessment/Plan   Denzel was seen today for follow-up, shortness of breath and sleeping problem.    Diagnoses and all orders for this visit:    Severe persistent asthma with acute exacerbation  -     methylPREDNISolone acetate (DEPO-medrol) injection 80 mg    Obstructive sleep apnea    Morbid obesity, unspecified obesity type (CMS/HCC)    Other orders  -     zafirlukast (Accolate) 20 MG tablet; Take 1 tablet by mouth 2 (Two) Times a Day.  -     Tiotropium Bromide Monohydrate (Spiriva Respimat) 1.25 MCG/ACT aerosol solution inhaler; Inhale 2 puffs Daily.  -     ipratropium-albuterol (DUO-NEB) 0.5-2.5 mg/3 ml nebulizer; Take 3 mL by nebulization 4 (Four) Times a Day As Needed for Wheezing or Shortness of Air.  -     budesonide-formoterol (Symbicort) 80-4.5 MCG/ACT inhaler; Inhale 2 puffs 2 (Two) Times a Day. Rinse mouth with water after use.  -     Asmanex  MCG/ACT aerosol; Inhale 1 puff 2 (Two) Times a Day.           Return in about 5 months (around 3/5/2021) for Recheck, For Dr. Barajas.    DISCUSSION (if any):  His symptoms of asthma are under poor control at this time.  I have prescribed him a Depo-Medrol injection today due to what seems to be an acute exacerbation of asthma.  He has already been treated with antibiotics so I will not prescribe those unless he begins running a fever.    He should continue Symbicort, Spiriva, Asmanex, the rescue medication, and  Accolate, and Dupixent all as directed.    Patient's medications for underlying asthma were reviewed with him in great detail.    Any needed adjustments to his pulmonary medications, either for clinical or insurance coverage reasons, have been made and are reflected in the orders.    Side effects of prescribed medications discussed with the patient.    Asthma action plan with discussed with him.    The patient was asked to call this office if the symptoms worsen.    I was able to review his ER visit as well as radiology notes.  I reviewed the chest x-ray images and there is no pneumonia noted.    The patient does need an influenza vaccination but I am very hesitant to give it to him today since he seems to be in an asthma exacerbation.  I have asked him to get a flu vaccine from his PCP or pharmacy as soon as he is feeling better.    I have asked him to call the office if he is not feeling better by the end of the week.    He will also need to watch his blood sugar levels since he has been given a steroid injection today.    He will need to definitely continue using BiPAP on a nightly basis with a full facemask at a pressure of 13/5.    He is 100% compliant with BiPAP use.    Dictated utilizing Dragon dictation.    This document was electronically signed by ANTONIO Ramírez October 5, 2020  09:45 EDT

## 2020-10-06 ENCOUNTER — OFFICE VISIT (OUTPATIENT)
Dept: ORTHOPEDIC SURGERY | Facility: CLINIC | Age: 60
End: 2020-10-06

## 2020-10-06 DIAGNOSIS — S83.241A TEAR OF MEDIAL MENISCUS OF RIGHT KNEE, CURRENT, UNSPECIFIED TEAR TYPE, INITIAL ENCOUNTER: ICD-10-CM

## 2020-10-06 DIAGNOSIS — M25.562 ARTHRALGIA OF BOTH KNEES: Primary | ICD-10-CM

## 2020-10-06 DIAGNOSIS — M25.561 ARTHRALGIA OF BOTH KNEES: Primary | ICD-10-CM

## 2020-10-06 DIAGNOSIS — M17.11 PRIMARY LOCALIZED OSTEOARTHROSIS OF THE KNEE, RIGHT: ICD-10-CM

## 2020-10-06 NOTE — PROGRESS NOTES
Subjective   Denzel Harding is a 60 y.o. male presents via telephone a visit to review MRI results of the right knee    Chief Complaint   Patient presents with   • Right Knee - Follow-up, Pain     MRI results   Patient states certain positions will cause his right knee to throb and ache.  He has tried cortisone injections as well as Visco supplementation injections without improvement.      Past Medical History:   Diagnosis Date   • Anxiety    • Arthritis    • Asthma    • Brachial neuritis 1/19/2017   • Cellulitis     left arm and right leg    • Chest pain    • CHF (congestive heart failure) (CMS/[facility])     Patient reported history May 2020    • COPD (chronic obstructive pulmonary disease) (CMS/[facility])    • Coronary artery disease     Patient reported CABG , 3 vessel   • Depression    • Diabetes mellitus (CMS/[facility])     diagnosed in 1998, checks fsbg 3-4x/day   • Dizziness    • Edema    • Elevated cholesterol    • GERD (gastroesophageal reflux disease)    • H/O chest x-ray 07/04/2015    Mild Left base atelectasis   • H/O echocardiogram 07/05/2015    Normal LVSF. EF of 60-65%. Grade 1 diastolic dysfunction of the LV myocardium. No evidence of pericardial effusion   • H/O exercise stress test    • Hearing loss     No use of hearing aids   • History of fracture     Reported 2 bones in left foot and a finger   • History of herniated intervertebral disc     History of left L5-S1 disc herniation   • History of pneumonia    • History of pneumonia    • Hypertension    • Impaired functional mobility, balance, gait, and endurance    • LOM (loss of memory) 1/19/2017   • Lower back pain     Right   • Measles    • Neuropathy     feet    • EDD treated with BiPAP     BiPAP HS - instructed to bring mask/machine DOS (setting 13 and 5)   • Palpitations    • Pericardial effusion    • Poor dentition     Patient reported missing multiple teeth   • Renal disorder    • Seizure disorder (CMS/[facility])    • Seizures (CMS/[facility])     FOCAL last  2009   • Shortness of breath 1/19/2017   • SOB (shortness of breath)    • Staph infection     back after sugery at the incision site    • Stroke (CMS/HCC)     x2 most recent , slight weakness in hands occasionaly    • Wears glasses          Past Surgical History:   Procedure Laterality Date   • BACK SURGERY     • CARDIAC CATHETERIZATION     • CARDIAC CATHETERIZATION N/A 8/11/2017    Procedure: Coronary angiography;  Surgeon: Dallas Hernandez MD;  Location: [facility] CATH INVASIVE LOCATION;  Service:    • CARDIAC CATHETERIZATION N/A 8/11/2017    Procedure: Left Heart Cath;  Surgeon: Dallas Hernandez MD;  Location: [facility] CATH INVASIVE LOCATION;  Service:    • CARDIAC CATHETERIZATION N/A 8/11/2017    Procedure: Left ventriculography;  Surgeon: Dallas Hernandez MD;  Location: [facility] CATH INVASIVE LOCATION;  Service:    • CARDIAC CATHETERIZATION      2002 x1 stent,  x1 stent   • CARDIOVASCULAR STRESS TEST  07/03/2017    WITH DR HERNANDEZ AT [facility]   • CARPAL TUNNEL RELEASE Right    • CATARACT EXTRACTION W/ INTRAOCULAR LENS IMPLANT Right 6/12/2017    Procedure: CATARACT PHACO EXTRACTION WITH INTRAOCULAR LENS IMPLANT RIGHT ;  Surgeon: Natalie Cao MD;  Location: [facility] OR;  Service:    • CATARACT EXTRACTION W/ INTRAOCULAR LENS IMPLANT Left 7/10/2017    Procedure: CATARACT PHACO EXTRACTION WITH INTRAOCULAR LENS IMPLANT LEFT;  Surgeon: Natalie Cao MD;  Location: [facility] OR;  Service:    • CHOLECYSTECTOMY     • COLONOSCOPY     • COLONOSCOPY N/A 7/2/2020    Procedure: COLONOSCOPY;  Surgeon: Petra Crowell MD;  Location: [facility] ENDOSCOPY;  Service: Gastroenterology;  Laterality: N/A;  poor prep   • CORONARY ANGIOPLASTY WITH STENT PLACEMENT      X1-LAD   • CORONARY ARTERY BYPASS GRAFT N/A 8/15/2017    Procedure: CORONARY ARTERY BYPASS GRAFTING x 3 UTILIZING THE LEFT INTERNAL MAMMARY ARTERY WITH ENDOSCOPIC VEIN HARVESTING OF THE RIGHT GREATER SAPHENOUS VEIN, HAMLET, LAD ENDARECTOMY;  Surgeon: London Damon MD;   Location:  [facility] OR;  Service:    • ENDOSCOPY     • EYE CAPSULOTOMY WITH LASER Right 11/25/2019    Procedure: EYE CAPSULOTOMY WITH LASER RIGHT;  Surgeon: Natalie Cao MD;  Location:  [facility] OR;  Service: Ophthalmology   • EYE CAPSULOTOMY WITH LASER Left 12/9/2019    Procedure: EYE CAPSULOTOMY WITH LASER LEFT;  Surgeon: Natalie Cao MD;  Location:  [facility] OR;  Service: Ophthalmology   • EYE SURGERY      cataract surgery both eyes   • INTERVENTIONAL RADIOLOGY PROCEDURE Bilateral 12/31/2019    Procedure: Carotid Cerebral Angiogram;  Surgeon: Damian Dubois MD;  Location:  [facility] CATH INVASIVE LOCATION;  Service: Interventional Radiology   • KNEE ARTHROSCOPY Bilateral    • KNEE ARTHROSCOPY Right 2010    Dr Lewis   • KNEE ARTHROSCOPY Left 2008    Dr Jameson   • MOUTH SURGERY      Oral extractions   • NEUROPLASTY / TRANSPOSITION ULNAR NERVE AT ELBOW Left    • OTHER SURGICAL HISTORY      Foraminotomy and discectomy   • PERICARDIAL WINDOW N/A 8/25/2017    Procedure: PERICARDIAL WINDOW;  Surgeon: Freeman Phillips MD;  Location:  [facility] OR;  Service:        Allergies   Allergen Reactions   • Sulfa Antibiotics Anaphylaxis, Nausea And Vomiting and Delirium   • Invokana [Canagliflozin] Unknown - Low Severity     DKA   • Codeine Nausea And Vomiting     Can only take with Phenergan   • Morphine Unknown - Low Severity     Does not work. It causes pain       Objective   There were no vitals taken for this visit.   Physical Exam      Procedures  PROCEDURE: MRI KNEE RIGHT  WO CONTRAST-        HISTORY: Osteoarthritis of knee; M25.561-Pain in right knee;  M25.562-Pain in left knee; M17.11-Unilateral primary osteoarthritis,  right knee     COMPARISON: None.     TECHNIQUE: Multiplanar multisequence imaging of the knee was performed  without the use of intravenous contrast.     FINDINGS: The ACL, PCL, MCL and LCL are intact. There is diffuse  abnormal signal within the lateral meniscus most pronounced in the  posterior horn felt to reflect  degenerative type signal. There is an  obliquely oriented tear of the posterior horn of the medial meniscus  which contacts the inferior articular surface. The extensor mechanism is  intact. The popliteus tendon is intact. There is mild thinning of the  articular cartilage with no areas of high-grade cartilage loss  identified. There is a trace joint effusion. Bone marrow signal is  homogeneous.     IMPRESSION:  1. Obliquely oriented tear of the posterior horn of the medial meniscus.  2. Diffuse abnormal signal within the lateral meniscus felt to reflect  degenerative change.     This report was finalized on 10/2/2020 7:35 AM by Aspen Decker M.D..  Assessment/Plan      Diagnosis Plan   1. Arthralgia of both knees     2. Primary localized osteoarthrosis of the knee, right     3. Tear of medial meniscus of right knee, current, unspecified tear type, initial encounter            Recommendations:  Because the patient has exhausted several initial conservative measures, I do feel a knee arthroscopy would be the next best option for the patient.  Patient agrees to make a follow-up appointment with his cardiologist to obtain clearance for the proposed right knee arthroscopy with partial medial meniscectomy    Patient agreeable to call or return sooner for any concerns.    This visit has been rescheduled as a phone visit to comply with patient safety concerns in accordance with CDC recommendations. Total time of discussion was 5 minutes.

## 2020-10-07 ENCOUNTER — PREP FOR SURGERY (OUTPATIENT)
Dept: OTHER | Facility: HOSPITAL | Age: 60
End: 2020-10-07

## 2020-10-07 ENCOUNTER — TELEPHONE (OUTPATIENT)
Dept: CARDIOLOGY | Facility: CLINIC | Age: 60
End: 2020-10-07

## 2020-10-07 DIAGNOSIS — M17.11 PRIMARY LOCALIZED OSTEOARTHROSIS OF THE KNEE, RIGHT: Primary | ICD-10-CM

## 2020-10-07 DIAGNOSIS — S83.241A TEAR OF MEDIAL MENISCUS OF RIGHT KNEE, CURRENT, UNSPECIFIED TEAR TYPE, INITIAL ENCOUNTER: ICD-10-CM

## 2020-10-07 RX ORDER — CEFAZOLIN SODIUM 2 G/50ML
2 SOLUTION INTRAVENOUS
Status: CANCELLED | OUTPATIENT
Start: 2020-10-08 | End: 2020-10-09

## 2020-10-13 ENCOUNTER — HOSPITAL ENCOUNTER (OUTPATIENT)
Dept: GENERAL RADIOLOGY | Facility: HOSPITAL | Age: 60
Discharge: HOME | End: 2020-10-13

## 2020-10-13 ENCOUNTER — OFFICE VISIT (OUTPATIENT)
Dept: ORTHOPEDIC SURGERY | Facility: CLINIC | Age: 60
End: 2020-10-13

## 2020-10-13 ENCOUNTER — APPOINTMENT (OUTPATIENT)
Dept: PREADMISSION TESTING | Facility: HOSPITAL | Age: 60
End: 2020-10-13

## 2020-10-13 VITALS
HEART RATE: 92 BPM | WEIGHT: 296 LBS | OXYGEN SATURATION: 95 % | DIASTOLIC BLOOD PRESSURE: 78 MMHG | HEIGHT: 70 IN | SYSTOLIC BLOOD PRESSURE: 145 MMHG | BODY MASS INDEX: 42.37 KG/M2

## 2020-10-13 VITALS — HEIGHT: 70 IN | RESPIRATION RATE: 18 BRPM | BODY MASS INDEX: 44.02 KG/M2

## 2020-10-13 DIAGNOSIS — S83.241A TEAR OF MEDIAL MENISCUS OF RIGHT KNEE, CURRENT, UNSPECIFIED TEAR TYPE, INITIAL ENCOUNTER: ICD-10-CM

## 2020-10-13 DIAGNOSIS — M17.11 PRIMARY LOCALIZED OSTEOARTHROSIS OF THE KNEE, RIGHT: Primary | ICD-10-CM

## 2020-10-13 DIAGNOSIS — M17.11 PRIMARY LOCALIZED OSTEOARTHROSIS OF THE KNEE, RIGHT: ICD-10-CM

## 2020-10-13 LAB
ALBUMIN SERPL-MCNC: 4.1 G/DL (ref 3.5–5.2)
ALBUMIN/GLOB SERPL: 1.4 G/DL
ALP SERPL-CCNC: 105 U/L (ref 39–117)
ALT SERPL W P-5'-P-CCNC: 64 U/L (ref 1–41)
ANION GAP SERPL CALCULATED.3IONS-SCNC: 10.3 MMOL/L (ref 5–15)
AST SERPL-CCNC: 67 U/L (ref 1–40)
BASOPHILS # BLD AUTO: 0.07 10*3/MM3 (ref 0–0.2)
BASOPHILS NFR BLD AUTO: 0.8 % (ref 0–1.5)
BILIRUB SERPL-MCNC: 1.2 MG/DL (ref 0–1.2)
BILIRUB UR QL STRIP: NEGATIVE
BUN SERPL-MCNC: 11 MG/DL (ref 6–20)
BUN/CREAT SERPL: 13.8 (ref 7–25)
CALCIUM SPEC-SCNC: 9.2 MG/DL (ref 8.6–10.5)
CHLORIDE SERPL-SCNC: 99 MMOL/L (ref 98–107)
CLARITY UR: CLEAR
CO2 SERPL-SCNC: 26.7 MMOL/L (ref 22–29)
COLOR UR: YELLOW
CREAT SERPL-MCNC: 0.8 MG/DL (ref 0.76–1.27)
DEPRECATED RDW RBC AUTO: 40.2 FL (ref 37–54)
EOSINOPHIL # BLD AUTO: 0.25 10*3/MM3 (ref 0–0.4)
EOSINOPHIL NFR BLD AUTO: 2.9 % (ref 0.3–6.2)
ERYTHROCYTE [DISTWIDTH] IN BLOOD BY AUTOMATED COUNT: 14.6 % (ref 12.3–15.4)
GFR SERPL CREATININE-BSD FRML MDRD: 99 ML/MIN/1.73
GLOBULIN UR ELPH-MCNC: 3 GM/DL
GLUCOSE SERPL-MCNC: 292 MG/DL (ref 65–99)
GLUCOSE UR STRIP-MCNC: ABNORMAL MG/DL
HCT VFR BLD AUTO: 42.2 % (ref 37.5–51)
HGB BLD-MCNC: 13.4 G/DL (ref 13–17.7)
HGB UR QL STRIP.AUTO: NEGATIVE
IMM GRANULOCYTES # BLD AUTO: 0.06 10*3/MM3 (ref 0–0.05)
IMM GRANULOCYTES NFR BLD AUTO: 0.7 % (ref 0–0.5)
KETONES UR QL STRIP: NEGATIVE
LEUKOCYTE ESTERASE UR QL STRIP.AUTO: NEGATIVE
LYMPHOCYTES # BLD AUTO: 1.44 10*3/MM3 (ref 0.7–3.1)
LYMPHOCYTES NFR BLD AUTO: 16.6 % (ref 19.6–45.3)
MCH RBC QN AUTO: 24.3 PG (ref 26.6–33)
MCHC RBC AUTO-ENTMCNC: 31.8 G/DL (ref 31.5–35.7)
MCV RBC AUTO: 76.6 FL (ref 79–97)
MONOCYTES # BLD AUTO: 0.45 10*3/MM3 (ref 0.1–0.9)
MONOCYTES NFR BLD AUTO: 5.2 % (ref 5–12)
NEUTROPHILS NFR BLD AUTO: 6.38 10*3/MM3 (ref 1.7–7)
NEUTROPHILS NFR BLD AUTO: 73.8 % (ref 42.7–76)
NITRITE UR QL STRIP: NEGATIVE
NRBC BLD AUTO-RTO: 0 /100 WBC (ref 0–0.2)
PH UR STRIP.AUTO: 5.5 [PH] (ref 5–8)
PLATELET # BLD AUTO: 271 10*3/MM3 (ref 140–450)
PMV BLD AUTO: 10.2 FL (ref 6–12)
POTASSIUM SERPL-SCNC: 4.2 MMOL/L (ref 3.5–5.2)
PROT SERPL-MCNC: 7.1 G/DL (ref 6–8.5)
PROT UR QL STRIP: ABNORMAL
RBC # BLD AUTO: 5.51 10*6/MM3 (ref 4.14–5.8)
SODIUM SERPL-SCNC: 136 MMOL/L (ref 136–145)
SP GR UR STRIP: 1.03 (ref 1–1.03)
UROBILINOGEN UR QL STRIP: ABNORMAL
WBC # BLD AUTO: 8.65 10*3/MM3 (ref 3.4–10.8)

## 2020-10-13 PROCEDURE — 85025 COMPLETE CBC W/AUTO DIFF WBC: CPT | Performed by: PHYSICIAN ASSISTANT

## 2020-10-13 PROCEDURE — U0004 COV-19 TEST NON-CDC HGH THRU: HCPCS | Performed by: PHYSICIAN ASSISTANT

## 2020-10-13 PROCEDURE — C9803 HOPD COVID-19 SPEC COLLECT: HCPCS

## 2020-10-13 PROCEDURE — 71046 X-RAY EXAM CHEST 2 VIEWS: CPT

## 2020-10-13 PROCEDURE — 80053 COMPREHEN METABOLIC PANEL: CPT | Performed by: PHYSICIAN ASSISTANT

## 2020-10-13 PROCEDURE — 87081 CULTURE SCREEN ONLY: CPT | Performed by: PHYSICIAN ASSISTANT

## 2020-10-13 PROCEDURE — 93005 ELECTROCARDIOGRAM TRACING: CPT

## 2020-10-13 PROCEDURE — 81003 URINALYSIS AUTO W/O SCOPE: CPT | Performed by: PHYSICIAN ASSISTANT

## 2020-10-13 PROCEDURE — S0260 H&P FOR SURGERY: HCPCS | Performed by: PHYSICIAN ASSISTANT

## 2020-10-13 PROCEDURE — 36415 COLL VENOUS BLD VENIPUNCTURE: CPT

## 2020-10-13 RX ORDER — TIZANIDINE 4 MG/1
4 TABLET ORAL EVERY 6 HOURS PRN
COMMUNITY
Start: 2020-10-06 | End: 2020-12-04

## 2020-10-13 NOTE — PROGRESS NOTES
"Denzel Harding is a 60 y.o. right hand dominant male   Pre-op Exam of the Right Knee (ATS, PMM scheduled 10/15/2020)          CHIEF COMPLAINT:    \" right knee pain\"    History of Present Illness      Patient presents for a planned right knee arthroscopy due to chronic right knee pain that has failed cortisone injection, knee bracing, home exercises, Visco supplementation injections and resting the knee.  Patient denies numbness or tingling to the lower extremity.  He states the knee pain does lock up on occasion.  He requires a cane to assist with ambulation.      PAST MEDICAL HISTORY:    Past Medical History:   Diagnosis Date   • Anxiety    • Arthritis    • Asthma    • Brachial neuritis 1/19/2017   • Cellulitis     left arm and right leg    • Chest pain    • CHF (congestive heart failure) (CMS/[facility])     Patient reported history May 2020    • COPD (chronic obstructive pulmonary disease) (CMS/[facility])    • Coronary artery disease     Patient reported CABG , 3 vessel   • Depression    • Diabetes mellitus (CMS/[facility])     diagnosed in 1998, checks fsbg 3-4x/day   • Dizziness    • Edema    • Elevated cholesterol    • GERD (gastroesophageal reflux disease)    • H/O chest x-ray 07/04/2015    Mild Left base atelectasis   • H/O echocardiogram 07/05/2015    Normal LVSF. EF of 60-65%. Grade 1 diastolic dysfunction of the LV myocardium. No evidence of pericardial effusion   • H/O exercise stress test    • Hearing loss     No use of hearing aids   • History of fracture     Reported 2 bones in left foot and a finger   • History of herniated intervertebral disc     History of left L5-S1 disc herniation   • History of pneumonia    • History of pneumonia    • Hypertension    • Impaired functional mobility, balance, gait, and endurance    • LOM (loss of memory) 1/19/2017   • Lower back pain     Right   • Measles    • Neuropathy     feet    • EDD treated with BiPAP     BiPAP HS - instructed to bring mask/machine DOS (setting 13 and " 5)   • Palpitations    • Pericardial effusion    • Poor dentition     Patient reported missing multiple teeth   • Renal disorder    • Seizure disorder (CMS/HCC)    • Seizures (CMS/HCC)     FOCAL last 2009   • Shortness of breath 1/19/2017   • SOB (shortness of breath)    • Staph infection     back after sugery at the incision site    • Stroke (CMS/HCC)     x2 most recent , slight weakness in hands occasionaly    • Wears glasses          Current Outpatient Medications:   •  albuterol sulfate HFA (Ventolin HFA) 108 (90 Base) MCG/ACT inhaler, Inhale 2 puffs Every 4 (Four) Hours As Needed for Wheezing or Shortness of Air., Disp: 1 inhaler, Rfl: 4  •  amitriptyline (ELAVIL) 25 MG tablet, Take 3 tablets by mouth every night at bedtime., Disp: 270 tablet, Rfl: 0  •  Asmanex  MCG/ACT aerosol, Inhale 1 puff 2 (Two) Times a Day., Disp: 1 inhaler, Rfl: 5  •  aspirin 81 MG EC tablet, Take 81 mg by mouth Daily., Disp: , Rfl:   •  atorvastatin (LIPITOR) 80 MG tablet, Take 1 tablet by mouth Every Night., Disp: 30 tablet, Rfl: 5  •  budesonide-formoterol (Symbicort) 80-4.5 MCG/ACT inhaler, Inhale 2 puffs 2 (Two) Times a Day. Rinse mouth with water after use., Disp: 1 inhaler, Rfl: 5  •  cloNIDine (Catapres) 0.1 MG tablet, Take 1 tablet by mouth every 6 hours as needed for SBP > 170 or DBP > 100, Disp: 120 tablet, Rfl: 0  •  clopidogrel (PLAVIX) 75 MG tablet, Take 75 mg by mouth Daily., Disp: , Rfl:   •  coenzyme Q10 100 MG capsule, Take 100 mg by mouth Daily., Disp: , Rfl:   •  CYCLOSET 0.8 MG tablet, Take 3.2 mg by mouth Every Morning., Disp: , Rfl: 0  •  DUPIXENT 300 MG/2ML solution prefilled syringe, Inject 300 mg under the skin into the appropriate area as directed Every 14 (Fourteen) Days., Disp: 2 syringe, Rfl: 11  •  gabapentin (NEURONTIN) 600 MG tablet, Take 600 mg by mouth 3 (Three) Times a Day., Disp: , Rfl: 0  •  guaifenesin (ROBITUSSIN) 100 MG/5ML liquid, Take 10 mL by mouth Every 4 (Four) Hours As Needed  for Cough., Disp: 118 mL, Rfl: 0  •  HUMALOG KWIKPEN 100 UNIT/ML solution pen-injector, Inject 10 Units under the skin 3 (Three) Times a Day With Meals. Follows SSI From PCP (Patient taking differently: Inject 50 Units under the skin into the appropriate area as directed 3 (Three) Times a Day With Meals. Follows SSI From PCP), Disp: , Rfl: 0  •  ipratropium-albuterol (DUO-NEB) 0.5-2.5 mg/3 ml nebulizer, Take 3 mL by nebulization 4 (Four) Times a Day As Needed for Wheezing or Shortness of Air., Disp: 360 mL, Rfl: 5  •  levocetirizine (XYZAL) 5 MG tablet, Take 1 tablet by mouth Every Evening., Disp: 90 tablet, Rfl: 1  •  meclizine (ANTIVERT) 25 MG tablet, Take 1 tablet by mouth 3 (Three) Times a Day As Needed for dizziness., Disp: 10 tablet, Rfl: 0  •  metoprolol tartrate (LOPRESSOR) 25 MG tablet, Take 1/2 tablet by mouth 2 (Two) times a day for 7 days, then 1/2 tablet once a day for 7 days,  then stop, Disp: 11 tablet, Rfl: 0  •  omeprazole (priLOSEC) 20 MG capsule, Take 1 capsule by mouth Daily., Disp: 30 capsule, Rfl: 5  •  OZEMPIC, 0.25 OR 0.5 MG/DOSE, 2 MG/1.5ML solution pen-injector, Inject 0.5 mg under the skin into the appropriate area as directed 1 (One) Time Per Week., Disp: , Rfl:   •  promethazine (PHENERGAN) 25 MG tablet, Take 1 tablet by mouth Every 6 (Six) Hours As Needed for Nausea or Vomiting., Disp: 20 tablet, Rfl: 0  •  Tiotropium Bromide Monohydrate (Spiriva Respimat) 1.25 MCG/ACT aerosol solution inhaler, Inhale 2 puffs Daily., Disp: 1 inhaler, Rfl: 5  •  tiZANidine (ZANAFLEX) 4 MG tablet, TK 1 C PO ATN PRF MSP, Disp: , Rfl:   •  TOUJEO SOLOSTAR 300 UNIT/ML solution pen-injector, Inject 60 Units as directed Daily. (Patient taking differently: Inject 120 Units as directed 2 (two) times a day.), Disp: , Rfl: 0  •  TURMERIC PO, Take 500 mg by mouth 2 (Two) Times a Day., Disp: , Rfl:   •  vitamin C (ASCORBIC ACID) 500 MG tablet, Take 500 mg by mouth Daily., Disp: , Rfl:   •  Vortioxetine HBr  (Trintellix) 20 MG tablet, Take 20 mg by mouth Daily., Disp: 30 tablet, Rfl: 5  •  zafirlukast (Accolate) 20 MG tablet, Take 1 tablet by mouth 2 (Two) Times a Day., Disp: 60 tablet, Rfl: 5    Past Surgical History:   Procedure Laterality Date   • BACK SURGERY     • CARDIAC CATHETERIZATION     • CARDIAC CATHETERIZATION N/A 8/11/2017    Procedure: Coronary angiography;  Surgeon: Dallas Hernandez MD;  Location: [facility] CATH INVASIVE LOCATION;  Service:    • CARDIAC CATHETERIZATION N/A 8/11/2017    Procedure: Left Heart Cath;  Surgeon: Dallas Hernandez MD;  Location: [facility] CATH INVASIVE LOCATION;  Service:    • CARDIAC CATHETERIZATION N/A 8/11/2017    Procedure: Left ventriculography;  Surgeon: Dallas Hernandez MD;  Location: [facility] CATH INVASIVE LOCATION;  Service:    • CARDIAC CATHETERIZATION      2002 x1 stent,  x1 stent   • CARDIOVASCULAR STRESS TEST  07/03/2017    WITH DR HERNANDEZ AT [facility]   • CARPAL TUNNEL RELEASE Right    • CATARACT EXTRACTION W/ INTRAOCULAR LENS IMPLANT Right 6/12/2017    Procedure: CATARACT PHACO EXTRACTION WITH INTRAOCULAR LENS IMPLANT RIGHT ;  Surgeon: Natalie Cao MD;  Location: [facility] OR;  Service:    • CATARACT EXTRACTION W/ INTRAOCULAR LENS IMPLANT Left 7/10/2017    Procedure: CATARACT PHACO EXTRACTION WITH INTRAOCULAR LENS IMPLANT LEFT;  Surgeon: Natalie Cao MD;  Location: [facility] OR;  Service:    • CHOLECYSTECTOMY     • COLONOSCOPY     • COLONOSCOPY N/A 7/2/2020    Procedure: COLONOSCOPY;  Surgeon: Petra Crowell MD;  Location: [facility] ENDOSCOPY;  Service: Gastroenterology;  Laterality: N/A;  poor prep   • CORONARY ANGIOPLASTY WITH STENT PLACEMENT      X1-LAD   • CORONARY ARTERY BYPASS GRAFT N/A 8/15/2017    Procedure: CORONARY ARTERY BYPASS GRAFTING x 3 UTILIZING THE LEFT INTERNAL MAMMARY ARTERY WITH ENDOSCOPIC VEIN HARVESTING OF THE RIGHT GREATER SAPHENOUS VEIN, HAMLET, LAD ENDARECTOMY;  Surgeon: London Damon MD;  Location: [facility] OR;  Service:    • ENDOSCOPY     • EYE  CAPSULOTOMY WITH LASER Right 2019    Procedure: EYE CAPSULOTOMY WITH LASER RIGHT;  Surgeon: Natalie Cao MD;  Location:  [facility] OR;  Service: Ophthalmology   • EYE CAPSULOTOMY WITH LASER Left 2019    Procedure: EYE CAPSULOTOMY WITH LASER LEFT;  Surgeon: Natalie Cao MD;  Location:  [facility] OR;  Service: Ophthalmology   • EYE SURGERY      cataract surgery both eyes   • INTERVENTIONAL RADIOLOGY PROCEDURE Bilateral 2019    Procedure: Carotid Cerebral Angiogram;  Surgeon: Damian Dubois MD;  Location:  [facility] CATH INVASIVE LOCATION;  Service: Interventional Radiology   • KNEE ARTHROSCOPY Bilateral    • KNEE ARTHROSCOPY Right     Dr Lewis   • KNEE ARTHROSCOPY Left     Dr Jameson   • MOUTH SURGERY      Oral extractions   • NEUROPLASTY / TRANSPOSITION ULNAR NERVE AT ELBOW Left    • OTHER SURGICAL HISTORY      Foraminotomy and discectomy   • PERICARDIAL WINDOW N/A 2017    Procedure: PERICARDIAL WINDOW;  Surgeon: Freeman Phillips MD;  Location:  [facility] OR;  Service:        Family History   Problem Relation Age of Onset   • Hypertension Mother    • Arthritis Mother    • Alcohol abuse Father    • Heart disease Other    • Stroke Other    • Hypertension Other    • Other Other         Neurologic disorder   • Parkinsonism Other    • Stroke Other    • Heart disease Other    • Hypertension Other    • Heart disease Other    • Stroke Other    • Hypertension Other        Social History     Socioeconomic History   • Marital status:      Spouse name: Not on file   • Number of children: 1   • Years of education: Not on file   • Highest education level: Not on file   Occupational History   • Occupation: [employer]     Employer: DISABLED     Comment: Disabled since -Back Problems   Tobacco Use   • Smoking status: Former Smoker     Packs/day: 1.00     Years: 10.00     Pack years: 10.00     Types: Cigarettes     Quit date: 1998     Years since quittin.7   • Smokeless tobacco:  "Former User     Types: Snuff     Quit date: 2017   Substance and Sexual Activity   • Alcohol use: Yes     Frequency: Monthly or less     Drinks per session: 3 or 4     Comment: 3 PER YEAR   • Drug use: No   • Sexual activity: Defer   Social History Narrative    Caffeine use: 0-1 serving daily.    Patient lives at home with wife.         Allergies   Allergen Reactions   • Sulfa Antibiotics Anaphylaxis, Nausea And Vomiting and Delirium   • Invokana [Canagliflozin] Unknown - Low Severity     DKA   • Codeine Nausea And Vomiting     Can only take with Phenergan   • Morphine Unknown - Low Severity     Does not work. It causes pain       Review of Systems   Constitutional: Negative for diaphoresis, fever and unexpected weight change.   HENT: Negative for dental problem and sore throat.    Eyes: Negative for visual disturbance.   Respiratory: Negative for shortness of breath.    Cardiovascular: Negative for chest pain.   Gastrointestinal: Negative for abdominal pain, constipation, diarrhea, nausea and vomiting.   Genitourinary: Negative for difficulty urinating and frequency.   Musculoskeletal: Positive for arthralgias (right knee) and gait problem (walks with a cane).   Neurological: Negative for headaches.   Hematological: Does not bruise/bleed easily.       I have reviewed the medical and surgical history, family history, social history, medications, and/or allergies, and the review of systems of this report.    PHYSICAL EXAMINATION:       Resp 18   Ht 177.8 cm (70\")   BMI 44.02 kg/m²     GENERAL [x] Well developed  []Ill appearing [x] No acute distress    HEENT [x]No acute changes [x] Normocephalic, atraumatic    NECK [x]Supple  [] No midline tenderness    LUNGS [x]Clear bilaterally [x]No wheezes []Rhonchi [] Rales    HEART [x] Regular rate  [x] Regular rhythm [] Irregular    ABDOMEN [x] Soft [x] Not tender [x] Not distended [x] Normal sounds    VAS/EXT [x] Normal Pulses []Edema []Cyanosis             SKIN [x] Warm " [x]Dry []Pink []Ecchymosis []Cool    NEURO [x] Sensation Intact [x] Motor Intact [x] Pulse intact  [] Dysesthesias:_________  []Weakness:__________             Physical Exam  Vitals signs and nursing note reviewed.   Skin:     Capillary Refill: Capillary refill takes less than 2 seconds.   Neurological:      General: No focal deficit present.      Mental Status: He is alert.       Right Knee Exam     Range of Motion   Right knee extension: 5.   Flexion: 110     Tests   Jovi:  Medial - positive Lateral - negative  Varus: negative Valgus: negative  Drawer:  Anterior - negative      Patellar apprehension: trace    Other   Erythema: absent  Sensation: normal  Pulse: present          Extremity DVT signs are negative on physical exam with negative Don sign, no calf pain, no palpable cords and no skin tone change   Neurologic Exam                 Independent Review of Radiographic Studies:    PROCEDURE: MRI KNEE RIGHT  WO CONTRAST-        HISTORY: Osteoarthritis of knee; M25.561-Pain in right knee;  M25.562-Pain in left knee; M17.11-Unilateral primary osteoarthritis,  right knee     COMPARISON: None.     TECHNIQUE: Multiplanar multisequence imaging of the knee was performed  without the use of intravenous contrast.     FINDINGS: The ACL, PCL, MCL and LCL are intact. There is diffuse  abnormal signal within the lateral meniscus most pronounced in the  posterior horn felt to reflect degenerative type signal. There is an  obliquely oriented tear of the posterior horn of the medial meniscus  which contacts the inferior articular surface. The extensor mechanism is  intact. The popliteus tendon is intact. There is mild thinning of the  articular cartilage with no areas of high-grade cartilage loss  identified. There is a trace joint effusion. Bone marrow signal is  homogeneous.     IMPRESSION:  1. Obliquely oriented tear of the posterior horn of the medial meniscus.  2. Diffuse abnormal signal within the lateral meniscus  felt to reflect  degenerative change.     This report was finalized on 10/2/2020 7:35 AM by Aspen Decker M.D..          Diagnoses and all orders for this visit:    1. Primary localized osteoarthrosis of the knee, right (Primary)    2. Tear of medial meniscus of right knee, current, unspecified tear type, initial encounter               Risks, benefits, and alternative treatments discussed with the patient: [x] Yes [] No    Risk benefits and merits of the proposed surgery were discussed and the patient's questions were answered risks discussed including and not limited to:  Anesthesia reactions  Blood loss and possible transfusion  Infection  Deep venous thrombosis and pulmonary embolus  Nerve, vascular or tendon injury  Fracture  Deformity  Stiffness  Weakness  Prosthesis and implant issues such as loosening, material wear or dislocation  Skin necrosis  Revision surgery or further treatment  Recurrence of problem and condition     Informed consent: [] Signed  [x] To be obtained at hospital  [] Both    Recommendations/Plan:  Guided on proper techniques for mobility, strength, agility and/or conditioning exercises  The nature of the proposed surgery reviewed with the patient including risks, benefits, rehabilitation, recovery timeframe, and outcome expectations  The nature of the proposed surgery reviewed with the patient including risks, benefits, rehabilitation, recovery timeframe, and outcome expectations  Take prescribed medications as instructed only as tolerated      I explained to the patient that a knee arthroscopy would be the next best approach to treat his right knee pain.  In the future he may benefit from a right total knee replacement once his BMI is below 40      PLANNED SURGICAL PROCEDURE: Right knee arthroscopy with partial medial meniscectomy and related procedures      Patient is encouraged and agreeable to call or return sooner for any issues or concerns.

## 2020-10-13 NOTE — DISCHARGE INSTRUCTIONS
PAT PASS GIVEN/REVIEWED WITH PT.  VERBALIZED UNDERSTANDING OF THE FOLLOWING:  DO NOT EAT, DRINK, SMOKE, USE SMOKELESS TOBACCO OR CHEW GUM AFTER MIDNIGHT THE NIGHT BEFORE SURGERY.  THIS ALSO INCLUDES HARD CANDIES AND MINTS.    DO NOT SHAVE THE AREA TO BE OPERATED ON AT LEAST 48 HOURS PRIOR TO THE PROCEDURE.  DO NOT WEAR MAKE UP OR NAIL POLISH.  DO NOT LEAVE IN ANY PIERCING OR WEAR JEWELRY THE DAY OF SURGERY.      DO NOT USE ADHESIVES IF YOU WEAR DENTURES.    DO NOT WEAR EYE CONTACTS; BRING IN YOUR GLASSES.    ONLY TAKE MEDICATION THE MORNING OF YOUR PROCEDURE IF INSTRUCTED BY YOUR SURGEON WITH ENOUGH WATER TO SWALLOW THE MEDICATION.  IF YOUR SURGEON DID NOT SPECIFY WHICH MEDICATIONS TO TAKE, YOU WILL NEED TO CALL THEIR OFFICE FOR FURTHER INSTRUCTIONS AND DO AS THEY INSTRUCT.    LEAVE ANYTHING YOU CONSIDER VALUABLE AT HOME.    YOU WILL NEED TO ARRANGE FOR SOMEONE TO DRIVE YOU HOME AFTER SURGERY.  IT IS RECOMMENDED THAT YOU DO NOT DRIVE, WORK, DRINK ALCOHOL OR MAKE MAJOR DECISIONS FOR AT LEAST 24 HOURS AFTER YOUR PROCEDURE IS COMPLETE.      THE DAY OF YOUR PROCEDURE, BRING IN THE FOLLOWING IF APPLICABLE:   PICTURE ID AND INSURANCE/MEDICARE OR MEDICAID CARDS/ANY CO-PAY THAT MAY BE DUE   COPY OF ADVANCED DIRECTIVE/LIVING WILL/POWER OR    CPAP/BIPAP/INHALERS   SKIN PREP SHEET   YOUR PREADMISSION TESTING PASS (IF NOT A PHONE HISTORY)        PAT patient education COVID testing pre-op instructions reviewed with pt.  Verbalized understanding.      Chlorhexidine wipes along with instruction/verification sheet given to pt.  Instructed pt to apply stickers from chlorhexidine wipes to verification sheet once skin prep has been  completed, and to [facility] the day of the procedure.  Pt. Verbalizes understanding.

## 2020-10-13 NOTE — H&P (VIEW-ONLY)
"Denzel Harding is a 60 y.o. right hand dominant male   Pre-op Exam of the Right Knee (ATS, PMM scheduled 10/15/2020)          CHIEF COMPLAINT:    \" right knee pain\"    History of Present Illness      Patient presents for a planned right knee arthroscopy due to chronic right knee pain that has failed cortisone injection, knee bracing, home exercises, Visco supplementation injections and resting the knee.  Patient denies numbness or tingling to the lower extremity.  He states the knee pain does lock up on occasion.  He requires a cane to assist with ambulation.      PAST MEDICAL HISTORY:    Past Medical History:   Diagnosis Date   • Anxiety    • Arthritis    • Asthma    • Brachial neuritis 1/19/2017   • Cellulitis     left arm and right leg    • Chest pain    • CHF (congestive heart failure) (CMS/[facility])     Patient reported history May 2020    • COPD (chronic obstructive pulmonary disease) (CMS/[facility])    • Coronary artery disease     Patient reported CABG , 3 vessel   • Depression    • Diabetes mellitus (CMS/[facility])     diagnosed in 1998, checks fsbg 3-4x/day   • Dizziness    • Edema    • Elevated cholesterol    • GERD (gastroesophageal reflux disease)    • H/O chest x-ray 07/04/2015    Mild Left base atelectasis   • H/O echocardiogram 07/05/2015    Normal LVSF. EF of 60-65%. Grade 1 diastolic dysfunction of the LV myocardium. No evidence of pericardial effusion   • H/O exercise stress test    • Hearing loss     No use of hearing aids   • History of fracture     Reported 2 bones in left foot and a finger   • History of herniated intervertebral disc     History of left L5-S1 disc herniation   • History of pneumonia    • History of pneumonia    • Hypertension    • Impaired functional mobility, balance, gait, and endurance    • LOM (loss of memory) 1/19/2017   • Lower back pain     Right   • Measles    • Neuropathy     feet    • EDD treated with BiPAP     BiPAP HS - instructed to bring mask/machine DOS (setting 13 and " 5)   • Palpitations    • Pericardial effusion    • Poor dentition     Patient reported missing multiple teeth   • Renal disorder    • Seizure disorder (CMS/HCC)    • Seizures (CMS/HCC)     FOCAL last 2009   • Shortness of breath 1/19/2017   • SOB (shortness of breath)    • Staph infection     back after sugery at the incision site    • Stroke (CMS/HCC)     x2 most recent , slight weakness in hands occasionaly    • Wears glasses          Current Outpatient Medications:   •  albuterol sulfate HFA (Ventolin HFA) 108 (90 Base) MCG/ACT inhaler, Inhale 2 puffs Every 4 (Four) Hours As Needed for Wheezing or Shortness of Air., Disp: 1 inhaler, Rfl: 4  •  amitriptyline (ELAVIL) 25 MG tablet, Take 3 tablets by mouth every night at bedtime., Disp: 270 tablet, Rfl: 0  •  Asmanex  MCG/ACT aerosol, Inhale 1 puff 2 (Two) Times a Day., Disp: 1 inhaler, Rfl: 5  •  aspirin 81 MG EC tablet, Take 81 mg by mouth Daily., Disp: , Rfl:   •  atorvastatin (LIPITOR) 80 MG tablet, Take 1 tablet by mouth Every Night., Disp: 30 tablet, Rfl: 5  •  budesonide-formoterol (Symbicort) 80-4.5 MCG/ACT inhaler, Inhale 2 puffs 2 (Two) Times a Day. Rinse mouth with water after use., Disp: 1 inhaler, Rfl: 5  •  cloNIDine (Catapres) 0.1 MG tablet, Take 1 tablet by mouth every 6 hours as needed for SBP > 170 or DBP > 100, Disp: 120 tablet, Rfl: 0  •  clopidogrel (PLAVIX) 75 MG tablet, Take 75 mg by mouth Daily., Disp: , Rfl:   •  coenzyme Q10 100 MG capsule, Take 100 mg by mouth Daily., Disp: , Rfl:   •  CYCLOSET 0.8 MG tablet, Take 3.2 mg by mouth Every Morning., Disp: , Rfl: 0  •  DUPIXENT 300 MG/2ML solution prefilled syringe, Inject 300 mg under the skin into the appropriate area as directed Every 14 (Fourteen) Days., Disp: 2 syringe, Rfl: 11  •  gabapentin (NEURONTIN) 600 MG tablet, Take 600 mg by mouth 3 (Three) Times a Day., Disp: , Rfl: 0  •  guaifenesin (ROBITUSSIN) 100 MG/5ML liquid, Take 10 mL by mouth Every 4 (Four) Hours As Needed  for Cough., Disp: 118 mL, Rfl: 0  •  HUMALOG KWIKPEN 100 UNIT/ML solution pen-injector, Inject 10 Units under the skin 3 (Three) Times a Day With Meals. Follows SSI From PCP (Patient taking differently: Inject 50 Units under the skin into the appropriate area as directed 3 (Three) Times a Day With Meals. Follows SSI From PCP), Disp: , Rfl: 0  •  ipratropium-albuterol (DUO-NEB) 0.5-2.5 mg/3 ml nebulizer, Take 3 mL by nebulization 4 (Four) Times a Day As Needed for Wheezing or Shortness of Air., Disp: 360 mL, Rfl: 5  •  levocetirizine (XYZAL) 5 MG tablet, Take 1 tablet by mouth Every Evening., Disp: 90 tablet, Rfl: 1  •  meclizine (ANTIVERT) 25 MG tablet, Take 1 tablet by mouth 3 (Three) Times a Day As Needed for dizziness., Disp: 10 tablet, Rfl: 0  •  metoprolol tartrate (LOPRESSOR) 25 MG tablet, Take 1/2 tablet by mouth 2 (Two) times a day for 7 days, then 1/2 tablet once a day for 7 days,  then stop, Disp: 11 tablet, Rfl: 0  •  omeprazole (priLOSEC) 20 MG capsule, Take 1 capsule by mouth Daily., Disp: 30 capsule, Rfl: 5  •  OZEMPIC, 0.25 OR 0.5 MG/DOSE, 2 MG/1.5ML solution pen-injector, Inject 0.5 mg under the skin into the appropriate area as directed 1 (One) Time Per Week., Disp: , Rfl:   •  promethazine (PHENERGAN) 25 MG tablet, Take 1 tablet by mouth Every 6 (Six) Hours As Needed for Nausea or Vomiting., Disp: 20 tablet, Rfl: 0  •  Tiotropium Bromide Monohydrate (Spiriva Respimat) 1.25 MCG/ACT aerosol solution inhaler, Inhale 2 puffs Daily., Disp: 1 inhaler, Rfl: 5  •  tiZANidine (ZANAFLEX) 4 MG tablet, TK 1 C PO ATN PRF MSP, Disp: , Rfl:   •  TOUJEO SOLOSTAR 300 UNIT/ML solution pen-injector, Inject 60 Units as directed Daily. (Patient taking differently: Inject 120 Units as directed 2 (two) times a day.), Disp: , Rfl: 0  •  TURMERIC PO, Take 500 mg by mouth 2 (Two) Times a Day., Disp: , Rfl:   •  vitamin C (ASCORBIC ACID) 500 MG tablet, Take 500 mg by mouth Daily., Disp: , Rfl:   •  Vortioxetine HBr  (Trintellix) 20 MG tablet, Take 20 mg by mouth Daily., Disp: 30 tablet, Rfl: 5  •  zafirlukast (Accolate) 20 MG tablet, Take 1 tablet by mouth 2 (Two) Times a Day., Disp: 60 tablet, Rfl: 5    Past Surgical History:   Procedure Laterality Date   • BACK SURGERY     • CARDIAC CATHETERIZATION     • CARDIAC CATHETERIZATION N/A 8/11/2017    Procedure: Coronary angiography;  Surgeon: Dallas Hernandez MD;  Location: [facility] CATH INVASIVE LOCATION;  Service:    • CARDIAC CATHETERIZATION N/A 8/11/2017    Procedure: Left Heart Cath;  Surgeon: Dallas Hernandez MD;  Location: [facility] CATH INVASIVE LOCATION;  Service:    • CARDIAC CATHETERIZATION N/A 8/11/2017    Procedure: Left ventriculography;  Surgeon: Dallas Hernandez MD;  Location: [facility] CATH INVASIVE LOCATION;  Service:    • CARDIAC CATHETERIZATION      2002 x1 stent,  x1 stent   • CARDIOVASCULAR STRESS TEST  07/03/2017    WITH DR HERNANDEZ AT [facility]   • CARPAL TUNNEL RELEASE Right    • CATARACT EXTRACTION W/ INTRAOCULAR LENS IMPLANT Right 6/12/2017    Procedure: CATARACT PHACO EXTRACTION WITH INTRAOCULAR LENS IMPLANT RIGHT ;  Surgeon: Natalie Cao MD;  Location: [facility] OR;  Service:    • CATARACT EXTRACTION W/ INTRAOCULAR LENS IMPLANT Left 7/10/2017    Procedure: CATARACT PHACO EXTRACTION WITH INTRAOCULAR LENS IMPLANT LEFT;  Surgeon: Natalie Cao MD;  Location: [facility] OR;  Service:    • CHOLECYSTECTOMY     • COLONOSCOPY     • COLONOSCOPY N/A 7/2/2020    Procedure: COLONOSCOPY;  Surgeon: Petra Crowell MD;  Location: [facility] ENDOSCOPY;  Service: Gastroenterology;  Laterality: N/A;  poor prep   • CORONARY ANGIOPLASTY WITH STENT PLACEMENT      X1-LAD   • CORONARY ARTERY BYPASS GRAFT N/A 8/15/2017    Procedure: CORONARY ARTERY BYPASS GRAFTING x 3 UTILIZING THE LEFT INTERNAL MAMMARY ARTERY WITH ENDOSCOPIC VEIN HARVESTING OF THE RIGHT GREATER SAPHENOUS VEIN, HAMLET, LAD ENDARECTOMY;  Surgeon: London Damon MD;  Location: [facility] OR;  Service:    • ENDOSCOPY     • EYE  CAPSULOTOMY WITH LASER Right 2019    Procedure: EYE CAPSULOTOMY WITH LASER RIGHT;  Surgeon: Natalie Cao MD;  Location:  [facility] OR;  Service: Ophthalmology   • EYE CAPSULOTOMY WITH LASER Left 2019    Procedure: EYE CAPSULOTOMY WITH LASER LEFT;  Surgeon: Natalie Cao MD;  Location:  [facility] OR;  Service: Ophthalmology   • EYE SURGERY      cataract surgery both eyes   • INTERVENTIONAL RADIOLOGY PROCEDURE Bilateral 2019    Procedure: Carotid Cerebral Angiogram;  Surgeon: Damian Dubois MD;  Location:  [facility] CATH INVASIVE LOCATION;  Service: Interventional Radiology   • KNEE ARTHROSCOPY Bilateral    • KNEE ARTHROSCOPY Right     Dr Lewis   • KNEE ARTHROSCOPY Left     Dr Jameson   • MOUTH SURGERY      Oral extractions   • NEUROPLASTY / TRANSPOSITION ULNAR NERVE AT ELBOW Left    • OTHER SURGICAL HISTORY      Foraminotomy and discectomy   • PERICARDIAL WINDOW N/A 2017    Procedure: PERICARDIAL WINDOW;  Surgeon: Freeman Phillips MD;  Location:  [facility] OR;  Service:        Family History   Problem Relation Age of Onset   • Hypertension Mother    • Arthritis Mother    • Alcohol abuse Father    • Heart disease Other    • Stroke Other    • Hypertension Other    • Other Other         Neurologic disorder   • Parkinsonism Other    • Stroke Other    • Heart disease Other    • Hypertension Other    • Heart disease Other    • Stroke Other    • Hypertension Other        Social History     Socioeconomic History   • Marital status:      Spouse name: Not on file   • Number of children: 1   • Years of education: Not on file   • Highest education level: Not on file   Occupational History   • Occupation: [employer]     Employer: DISABLED     Comment: Disabled since -Back Problems   Tobacco Use   • Smoking status: Former Smoker     Packs/day: 1.00     Years: 10.00     Pack years: 10.00     Types: Cigarettes     Quit date: 1998     Years since quittin.7   • Smokeless tobacco:  "Former User     Types: Snuff     Quit date: 2017   Substance and Sexual Activity   • Alcohol use: Yes     Frequency: Monthly or less     Drinks per session: 3 or 4     Comment: 3 PER YEAR   • Drug use: No   • Sexual activity: Defer   Social History Narrative    Caffeine use: 0-1 serving daily.    Patient lives at home with wife.         Allergies   Allergen Reactions   • Sulfa Antibiotics Anaphylaxis, Nausea And Vomiting and Delirium   • Invokana [Canagliflozin] Unknown - Low Severity     DKA   • Codeine Nausea And Vomiting     Can only take with Phenergan   • Morphine Unknown - Low Severity     Does not work. It causes pain       Review of Systems   Constitutional: Negative for diaphoresis, fever and unexpected weight change.   HENT: Negative for dental problem and sore throat.    Eyes: Negative for visual disturbance.   Respiratory: Negative for shortness of breath.    Cardiovascular: Negative for chest pain.   Gastrointestinal: Negative for abdominal pain, constipation, diarrhea, nausea and vomiting.   Genitourinary: Negative for difficulty urinating and frequency.   Musculoskeletal: Positive for arthralgias (right knee) and gait problem (walks with a cane).   Neurological: Negative for headaches.   Hematological: Does not bruise/bleed easily.       I have reviewed the medical and surgical history, family history, social history, medications, and/or allergies, and the review of systems of this report.    PHYSICAL EXAMINATION:       Resp 18   Ht 177.8 cm (70\")   BMI 44.02 kg/m²     GENERAL [x] Well developed  []Ill appearing [x] No acute distress    HEENT [x]No acute changes [x] Normocephalic, atraumatic    NECK [x]Supple  [] No midline tenderness    LUNGS [x]Clear bilaterally [x]No wheezes []Rhonchi [] Rales    HEART [x] Regular rate  [x] Regular rhythm [] Irregular    ABDOMEN [x] Soft [x] Not tender [x] Not distended [x] Normal sounds    VAS/EXT [x] Normal Pulses []Edema []Cyanosis             SKIN [x] Warm " [x]Dry []Pink []Ecchymosis []Cool    NEURO [x] Sensation Intact [x] Motor Intact [x] Pulse intact  [] Dysesthesias:_________  []Weakness:__________             Physical Exam  Vitals signs and nursing note reviewed.   Skin:     Capillary Refill: Capillary refill takes less than 2 seconds.   Neurological:      General: No focal deficit present.      Mental Status: He is alert.       Right Knee Exam     Range of Motion   Right knee extension: 5.   Flexion: 110     Tests   Jovi:  Medial - positive Lateral - negative  Varus: negative Valgus: negative  Drawer:  Anterior - negative      Patellar apprehension: trace    Other   Erythema: absent  Sensation: normal  Pulse: present          Extremity DVT signs are negative on physical exam with negative Don sign, no calf pain, no palpable cords and no skin tone change   Neurologic Exam                 Independent Review of Radiographic Studies:    PROCEDURE: MRI KNEE RIGHT  WO CONTRAST-        HISTORY: Osteoarthritis of knee; M25.561-Pain in right knee;  M25.562-Pain in left knee; M17.11-Unilateral primary osteoarthritis,  right knee     COMPARISON: None.     TECHNIQUE: Multiplanar multisequence imaging of the knee was performed  without the use of intravenous contrast.     FINDINGS: The ACL, PCL, MCL and LCL are intact. There is diffuse  abnormal signal within the lateral meniscus most pronounced in the  posterior horn felt to reflect degenerative type signal. There is an  obliquely oriented tear of the posterior horn of the medial meniscus  which contacts the inferior articular surface. The extensor mechanism is  intact. The popliteus tendon is intact. There is mild thinning of the  articular cartilage with no areas of high-grade cartilage loss  identified. There is a trace joint effusion. Bone marrow signal is  homogeneous.     IMPRESSION:  1. Obliquely oriented tear of the posterior horn of the medial meniscus.  2. Diffuse abnormal signal within the lateral meniscus  felt to reflect  degenerative change.     This report was finalized on 10/2/2020 7:35 AM by Aspen Decker M.D..          Diagnoses and all orders for this visit:    1. Primary localized osteoarthrosis of the knee, right (Primary)    2. Tear of medial meniscus of right knee, current, unspecified tear type, initial encounter               Risks, benefits, and alternative treatments discussed with the patient: [x] Yes [] No    Risk benefits and merits of the proposed surgery were discussed and the patient's questions were answered risks discussed including and not limited to:  Anesthesia reactions  Blood loss and possible transfusion  Infection  Deep venous thrombosis and pulmonary embolus  Nerve, vascular or tendon injury  Fracture  Deformity  Stiffness  Weakness  Prosthesis and implant issues such as loosening, material wear or dislocation  Skin necrosis  Revision surgery or further treatment  Recurrence of problem and condition     Informed consent: [] Signed  [x] To be obtained at hospital  [] Both    Recommendations/Plan:  Guided on proper techniques for mobility, strength, agility and/or conditioning exercises  The nature of the proposed surgery reviewed with the patient including risks, benefits, rehabilitation, recovery timeframe, and outcome expectations  The nature of the proposed surgery reviewed with the patient including risks, benefits, rehabilitation, recovery timeframe, and outcome expectations  Take prescribed medications as instructed only as tolerated      I explained to the patient that a knee arthroscopy would be the next best approach to treat his right knee pain.  In the future he may benefit from a right total knee replacement once his BMI is below 40      PLANNED SURGICAL PROCEDURE: Right knee arthroscopy with partial medial meniscectomy and related procedures      Patient is encouraged and agreeable to call or return sooner for any issues or concerns.

## 2020-10-14 ENCOUNTER — ANESTHESIA EVENT (OUTPATIENT)
Dept: PERIOP | Facility: HOSPITAL | Age: 60
End: 2020-10-14

## 2020-10-14 DIAGNOSIS — S83.241A TEAR OF MEDIAL MENISCUS OF RIGHT KNEE, CURRENT, UNSPECIFIED TEAR TYPE, INITIAL ENCOUNTER: ICD-10-CM

## 2020-10-14 DIAGNOSIS — M17.11 PRIMARY LOCALIZED OSTEOARTHROSIS OF THE KNEE, RIGHT: Primary | ICD-10-CM

## 2020-10-14 LAB
MRSA SPEC QL CULT: NORMAL
SARS-COV-2 RNA RESP QL NAA+PROBE: NOT DETECTED

## 2020-10-14 RX ORDER — HYDROCODONE BITARTRATE AND ACETAMINOPHEN 7.5; 325 MG/1; MG/1
1 TABLET ORAL EVERY 8 HOURS PRN
Qty: 21 TABLET | Refills: 0 | Status: SHIPPED | OUTPATIENT
Start: 2020-10-14 | End: 2021-01-06

## 2020-10-15 ENCOUNTER — ANESTHESIA (OUTPATIENT)
Dept: PERIOP | Facility: HOSPITAL | Age: 60
End: 2020-10-15

## 2020-10-15 ENCOUNTER — HOSPITAL ENCOUNTER (OUTPATIENT)
Facility: HOSPITAL | Age: 60
Setting detail: HOSPITAL OUTPATIENT SURGERY
Discharge: HOME | End: 2020-10-15
Attending: ORTHOPAEDIC SURGERY | Admitting: ORTHOPAEDIC SURGERY

## 2020-10-15 VITALS
DIASTOLIC BLOOD PRESSURE: 69 MMHG | OXYGEN SATURATION: 94 % | SYSTOLIC BLOOD PRESSURE: 161 MMHG | RESPIRATION RATE: 17 BRPM | TEMPERATURE: 98 F | HEART RATE: 97 BPM

## 2020-10-15 DIAGNOSIS — M17.11 PRIMARY LOCALIZED OSTEOARTHROSIS OF THE KNEE, RIGHT: ICD-10-CM

## 2020-10-15 DIAGNOSIS — S83.241A TEAR OF MEDIAL MENISCUS OF RIGHT KNEE, CURRENT, UNSPECIFIED TEAR TYPE, INITIAL ENCOUNTER: ICD-10-CM

## 2020-10-15 LAB
GLUCOSE BLDC GLUCOMTR-MCNC: 271 MG/DL (ref 70–130)
GLUCOSE BLDC GLUCOMTR-MCNC: 297 MG/DL (ref 70–130)

## 2020-10-15 PROCEDURE — 82962 GLUCOSE BLOOD TEST: CPT

## 2020-10-15 PROCEDURE — 25010000002 PROPOFOL 10 MG/ML EMULSION: Performed by: NURSE ANESTHETIST, CERTIFIED REGISTERED

## 2020-10-15 PROCEDURE — 25010000002 ONDANSETRON PER 1 MG: Performed by: NURSE ANESTHETIST, CERTIFIED REGISTERED

## 2020-10-15 PROCEDURE — 94799 UNLISTED PULMONARY SVC/PX: CPT

## 2020-10-15 PROCEDURE — 29880 ARTHRS KNE SRG MNISECTMY M&L: CPT | Performed by: ORTHOPAEDIC SURGERY

## 2020-10-15 PROCEDURE — 25010000002 EPINEPHRINE PER 0.1 MG: Performed by: ORTHOPAEDIC SURGERY

## 2020-10-15 PROCEDURE — 25010000002 DEXAMETHASONE PER 1 MG: Performed by: NURSE ANESTHETIST, CERTIFIED REGISTERED

## 2020-10-15 PROCEDURE — 25010000002 FENTANYL CITRATE (PF) 100 MCG/2ML SOLUTION: Performed by: NURSE ANESTHETIST, CERTIFIED REGISTERED

## 2020-10-15 PROCEDURE — 94640 AIRWAY INHALATION TREATMENT: CPT

## 2020-10-15 PROCEDURE — 25010000002 ROPIVACAINE 0.75 % SOLUTION: Performed by: ORTHOPAEDIC SURGERY

## 2020-10-15 PROCEDURE — 25010000003 CEFAZOLIN SODIUM-DEXTROSE 2-3 GM-%(50ML) RECONSTITUTED SOLUTION: Performed by: PHYSICIAN ASSISTANT

## 2020-10-15 PROCEDURE — 25010000002 MIDAZOLAM PER 1MG: Performed by: NURSE ANESTHETIST, CERTIFIED REGISTERED

## 2020-10-15 RX ORDER — IPRATROPIUM BROMIDE AND ALBUTEROL SULFATE 2.5; .5 MG/3ML; MG/3ML
SOLUTION RESPIRATORY (INHALATION)
Status: DISCONTINUED
Start: 2020-10-15 | End: 2020-10-15 | Stop reason: WASHOUT

## 2020-10-15 RX ORDER — MEPERIDINE HYDROCHLORIDE 25 MG/ML
50 INJECTION INTRAMUSCULAR; INTRAVENOUS; SUBCUTANEOUS ONCE AS NEEDED
Status: CANCELLED | OUTPATIENT
Start: 2020-10-15 | End: 2020-10-15

## 2020-10-15 RX ORDER — ROPIVACAINE HYDROCHLORIDE 7.5 MG/ML
INJECTION, SOLUTION EPIDURAL; PERINEURAL AS NEEDED
Status: DISCONTINUED | OUTPATIENT
Start: 2020-10-15 | End: 2020-10-15 | Stop reason: HOSPADM

## 2020-10-15 RX ORDER — ONDANSETRON 2 MG/ML
INJECTION INTRAMUSCULAR; INTRAVENOUS AS NEEDED
Status: DISCONTINUED | OUTPATIENT
Start: 2020-10-15 | End: 2020-10-15 | Stop reason: SURG

## 2020-10-15 RX ORDER — ONDANSETRON 2 MG/ML
4 INJECTION INTRAMUSCULAR; INTRAVENOUS ONCE AS NEEDED
Status: CANCELLED | OUTPATIENT
Start: 2020-10-15

## 2020-10-15 RX ORDER — DEXAMETHASONE SODIUM PHOSPHATE 4 MG/ML
INJECTION, SOLUTION INTRA-ARTICULAR; INTRALESIONAL; INTRAMUSCULAR; INTRAVENOUS; SOFT TISSUE AS NEEDED
Status: DISCONTINUED | OUTPATIENT
Start: 2020-10-15 | End: 2020-10-15 | Stop reason: SURG

## 2020-10-15 RX ORDER — SODIUM CHLORIDE, SODIUM LACTATE, POTASSIUM CHLORIDE, CALCIUM CHLORIDE 600; 310; 30; 20 MG/100ML; MG/100ML; MG/100ML; MG/100ML
1000 INJECTION, SOLUTION INTRAVENOUS CONTINUOUS
Status: DISCONTINUED | OUTPATIENT
Start: 2020-10-15 | End: 2020-10-15 | Stop reason: HOSPADM

## 2020-10-15 RX ORDER — MIDAZOLAM HYDROCHLORIDE 2 MG/2ML
INJECTION, SOLUTION INTRAMUSCULAR; INTRAVENOUS AS NEEDED
Status: DISCONTINUED | OUTPATIENT
Start: 2020-10-15 | End: 2020-10-15 | Stop reason: SURG

## 2020-10-15 RX ORDER — MAGNESIUM HYDROXIDE 1200 MG/15ML
LIQUID ORAL AS NEEDED
Status: DISCONTINUED | OUTPATIENT
Start: 2020-10-15 | End: 2020-10-15 | Stop reason: HOSPADM

## 2020-10-15 RX ORDER — KETAMINE HCL IN NACL, ISO-OSM 100MG/10ML
SYRINGE (ML) INJECTION AS NEEDED
Status: DISCONTINUED | OUTPATIENT
Start: 2020-10-15 | End: 2020-10-15 | Stop reason: SURG

## 2020-10-15 RX ORDER — CEFAZOLIN SODIUM 2 G/50ML
2 SOLUTION INTRAVENOUS ONCE
Status: COMPLETED | OUTPATIENT
Start: 2020-10-15 | End: 2020-10-15

## 2020-10-15 RX ORDER — LIDOCAINE HYDROCHLORIDE AND EPINEPHRINE 20; 5 MG/ML; UG/ML
INJECTION, SOLUTION EPIDURAL; INFILTRATION; INTRACAUDAL; PERINEURAL AS NEEDED
Status: DISCONTINUED | OUTPATIENT
Start: 2020-10-15 | End: 2020-10-15 | Stop reason: SURG

## 2020-10-15 RX ORDER — FENTANYL CITRATE 50 UG/ML
INJECTION, SOLUTION INTRAMUSCULAR; INTRAVENOUS AS NEEDED
Status: DISCONTINUED | OUTPATIENT
Start: 2020-10-15 | End: 2020-10-15 | Stop reason: SURG

## 2020-10-15 RX ORDER — IPRATROPIUM BROMIDE AND ALBUTEROL SULFATE 2.5; .5 MG/3ML; MG/3ML
3 SOLUTION RESPIRATORY (INHALATION) ONCE
Status: COMPLETED | OUTPATIENT
Start: 2020-10-15 | End: 2020-10-15

## 2020-10-15 RX ORDER — IPRATROPIUM BROMIDE AND ALBUTEROL SULFATE 2.5; .5 MG/3ML; MG/3ML
3 SOLUTION RESPIRATORY (INHALATION) ONCE
Status: CANCELLED | OUTPATIENT
Start: 2020-10-15 | End: 2020-10-15

## 2020-10-15 RX ORDER — SODIUM CHLORIDE 0.9 % (FLUSH) 0.9 %
10 SYRINGE (ML) INJECTION AS NEEDED
Status: DISCONTINUED | OUTPATIENT
Start: 2020-10-15 | End: 2020-10-15 | Stop reason: HOSPADM

## 2020-10-15 RX ORDER — BUPIVACAINE HYDROCHLORIDE AND EPINEPHRINE 5; 5 MG/ML; UG/ML
INJECTION, SOLUTION EPIDURAL; INTRACAUDAL; PERINEURAL AS NEEDED
Status: DISCONTINUED | OUTPATIENT
Start: 2020-10-15 | End: 2020-10-15 | Stop reason: SURG

## 2020-10-15 RX ORDER — EPINEPHRINE 1 MG/ML
INJECTION, SOLUTION, CONCENTRATE INTRAVENOUS AS NEEDED
Status: DISCONTINUED | OUTPATIENT
Start: 2020-10-15 | End: 2020-10-15 | Stop reason: HOSPADM

## 2020-10-15 RX ORDER — LIDOCAINE HYDROCHLORIDE AND EPINEPHRINE 10; 10 MG/ML; UG/ML
INJECTION, SOLUTION INFILTRATION; PERINEURAL AS NEEDED
Status: DISCONTINUED | OUTPATIENT
Start: 2020-10-15 | End: 2020-10-15 | Stop reason: HOSPADM

## 2020-10-15 RX ADMIN — PROPOFOL 100 MCG/KG/MIN: 10 INJECTION, EMULSION INTRAVENOUS at 08:08

## 2020-10-15 RX ADMIN — SODIUM CHLORIDE, POTASSIUM CHLORIDE, SODIUM LACTATE AND CALCIUM CHLORIDE 1000 ML: 600; 310; 30; 20 INJECTION, SOLUTION INTRAVENOUS at 07:28

## 2020-10-15 RX ADMIN — BUPIVACAINE HYDROCHLORIDE AND EPINEPHRINE BITARTRATE 30 ML: 5; .0091 INJECTION, SOLUTION EPIDURAL; INTRACAUDAL; PERINEURAL at 08:12

## 2020-10-15 RX ADMIN — Medication 10 MG: at 08:09

## 2020-10-15 RX ADMIN — FAMOTIDINE 20 MG: 10 INJECTION, SOLUTION INTRAVENOUS at 07:28

## 2020-10-15 RX ADMIN — FENTANYL CITRATE 50 MCG: 50 INJECTION INTRAMUSCULAR; INTRAVENOUS at 08:09

## 2020-10-15 RX ADMIN — Medication 10 MG: at 08:38

## 2020-10-15 RX ADMIN — DEXAMETHASONE SODIUM PHOSPHATE 4 MG: 4 INJECTION, SOLUTION INTRAMUSCULAR; INTRAVENOUS at 09:09

## 2020-10-15 RX ADMIN — ONDANSETRON 4 MG: 2 INJECTION INTRAMUSCULAR; INTRAVENOUS at 09:09

## 2020-10-15 RX ADMIN — MIDAZOLAM HYDROCHLORIDE 2 MG: 1 INJECTION, SOLUTION INTRAMUSCULAR; INTRAVENOUS at 08:07

## 2020-10-15 RX ADMIN — MIDAZOLAM HYDROCHLORIDE 2 MG: 1 INJECTION, SOLUTION INTRAMUSCULAR; INTRAVENOUS at 08:16

## 2020-10-15 RX ADMIN — FENTANYL CITRATE 50 MCG: 50 INJECTION INTRAMUSCULAR; INTRAVENOUS at 08:14

## 2020-10-15 RX ADMIN — LIDOCAINE HYDROCHLORIDE,EPINEPHRINE BITARTRATE 30 ML: 20; .005 INJECTION, SOLUTION EPIDURAL; INFILTRATION; INTRACAUDAL; PERINEURAL at 08:12

## 2020-10-15 RX ADMIN — Medication 10 MG: at 08:16

## 2020-10-15 RX ADMIN — Medication 10 MG: at 08:12

## 2020-10-15 RX ADMIN — IPRATROPIUM BROMIDE AND ALBUTEROL SULFATE 3 ML: .5; 3 SOLUTION RESPIRATORY (INHALATION) at 07:50

## 2020-10-15 RX ADMIN — CEFAZOLIN SODIUM 2 G: 2 SOLUTION INTRAVENOUS at 08:06

## 2020-10-15 NOTE — NURSING NOTE
Respiratory came and give breathing treatment, but they did not let me know. This did delay the case a few minutes.

## 2020-10-15 NOTE — INTERVAL H&P NOTE
H&P reviewed. The patient was examined and there are no changes to the H&P, inclusive of the physical exam heart, lungs and procedure site specific exam as noted on the current (within 30 days) history and physical full detailed report.    Vitals:    10/15/20 0750   Pulse: 92   Resp: 16     [place] Lewis MD  10/15/2020  07:53 EDT

## 2020-10-15 NOTE — ANESTHESIA PREPROCEDURE EVALUATION
Anesthesia Evaluation     Patient summary reviewed and Nursing notes reviewed   no history of anesthetic complications:  NPO Solid Status: > 8 hours  NPO Liquid Status: > 8 hours           Airway   Mallampati: I  TM distance: <3 FB  Neck ROM: full  no difficulty expected and Possible difficult intubation  Dental - normal exam     Pulmonary    (+) pneumonia resolved , a smoker Former, COPD, asthma,shortness of breath, sleep apnea, decreased breath sounds,   Cardiovascular     Rhythm: regular    (+) hypertension, CAD, CABG ( x3 2017) >6 Months, cardiac stents within the past 12 months dysrhythmias, CHF , pericardial effusion, HUTCHINS, PVD, hyperlipidemia,       Neuro/Psych  (+) seizures, CVA, headaches, dizziness/light headedness, numbness, psychiatric history Anxiety and Depression,     GI/Hepatic/Renal/Endo    (+) obesity, morbid obesity, GERD,  hepatitis, liver disease, renal disease, diabetes mellitus type 2 poorly controlled using insulin,     Musculoskeletal     (+) arthralgias, back pain, chronic pain, myalgias,   Abdominal   (+) obese,     Bowel sounds: normal.   Substance History - negative use     OB/GYN negative ob/gyn ROS         Other   arthritis,      ROS/Med Hx Other: [facility]  fbs 148  ekg sr nstw prolonged qt  Ef 67% 2019 stress tests    Echo 2019 Mild LVH with normal LV systolic function ( EF is over 55%)  2) Mild left atrial enlargement   3) Trace MR and TR   4) Normal RV function   ekg sr nstw                  Anesthesia Plan    ASA 4     MAC   (Risks and benefits discussed including risk of aspiration, recall and dental damage. All patient questions answered.    Will continue with plan of care.ansley)  intravenous induction     Anesthetic plan, all risks, benefits, and alternatives have been provided, discussed and informed consent has been obtained with: patient.    Plan discussed with CRNA.

## 2020-10-15 NOTE — OP NOTE
[address],  [address]  [place] [address]  [phone]       OPERATIVE REPORT          PATIENT NAME:  Denzel Harding                            YOB: 1960        PREOP DIAGNOSIS:   Right knee medial and lateral meniscal tears and moderate to advanced osteoarthritis.     POSTOP DIAGNOSIS:   Same.     PROCEDURE:   Right knee diagnostic arthroscopy, partial medial and lateral menisectomy and three compartment chondroplasty.     SURGEON:       Gabriel Lewis MD    OPERATIVE TEAM:  Circulator: Rhonda Epstein RN  Scrub Person: Bhavani Christie    ANESTHETIST:  CRNA: Amaury Cavanaugh CRNA    ANESTHESIA:   General plus local.    ESTIM BLOOD LOSS:   5 ml     FINDINGS:   Patella ridge and trochlear groove diffuse undersurface grade 3 chondral wear, displaced radial and cleavage horizontal posterior horn medial meniscal tear, larger complex pattern radial pattern fragmented displaced posterior and anterior horn lateral meniscal tear, diffuse grade 3 fibrillation and flap delamination chondral wear primarily of the lateral femoral condyle and diffuse moderate to advanced osteoarthritis.     SPECIMENS:     None.     IMPLANTS:       None.     COMPLICATIONS:     None.     DISPOSITION:     Stable to recovery.     INDICATIONS:     Persistent knee pain, swelling, stiffness, mechanical catching and dysfunction with clinical exam and imaging consistent with knee effusion, complex posterior horn medial meniscal tear and osteoarthritis.     NARRATIVE:     The patient presents with continued knee pain and activity limitations. Treatment efforts thus far have provided limited relief. The option of elective knee arthroscopy evaluation and treatment discussed and the patient would like to proceed. Risks, benefits and alternatives discussed and informed consent for surgical procedure obtained. Risks discussed including but not limited to anesthesia, infection and persistent or progressive  knee joint symptoms. Goals include the potential for pain relief, decreased swelling, improved mobility and function with the knee condition. The patient presents for elective knee diagnostic arthroscopic evaluation and treatment.     Antibiotic prophylaxis was given. Surgeon site marking and a time out were performed prior to the procedure. Anesthesia was effective and well tolerated. The knee and leg was prepped and draped in the usual sterile fashion. A tourniquet was not used and there was good hemostasis throughout the procedure. Anesthesia placed an intra-articular local anesthetic injection to complement the other anesthesia.  Also, at the start of the procedure I placed one percent lidocaine with epinephrine was injected at the portal sites and at the end of the procedure a ropivicaine intra-articular injection was given for post-operative pain control. After sterile prep and drape a knee arthroscopy was performed.     Anteromedial and anterolateral portals were made.  Diagnostic joint findings are as noted above.  Also, there was central patella tracking. There was a large displaced fragmented complex pattern posterior horn medial meniscus tear.  In the lateral compartment, the popliteus tendon was intact. In the intercondylar notch, the ACL and PCL were probed, intact and stable. Using various biters and a full radius shaver, a partial medial and lateral menisectomy to a smooth stable margin and a three compartment chondroplasty of the involved areas were performed.     Representative arthroscopic photos were saved. The knee joint was irrigated and suctioned clear. The portal sites were closed with staples and a sterile xeroform, gauze and Covaderm dressing was applied. Anesthesia was effective and well tolerated. There were no complications of the procedure. The patient was transferred in stable condition to recovery.

## 2020-10-15 NOTE — DISCHARGE INSTRUCTIONS
No pushing, pulling, tugging,  heavy lifting, or strenuous activity.  No major decision making, driving, or drinking alcoholic beverages for 24 hours. ( due to the medications you have  received)  Always use good hand hygiene/washing techniques.  NO driving while taking pain medications.    * if you have an incision:  Check your incision area every day for signs of infection.   Check for:  * more redness, swelling, or pain  *more fluid or blood  *warmth  *pus or bad smell.    To assist you in voiding:  Drink plenty of fluids  Listen to running water while attempting to void.    If you are unable to urinate and you have an uncomfortable urge to void or it has been   6 hours since you were discharged, return to the Emergency Room.    Keep right knee elevated and apply ice to incision as directed per physician, remove, and reapply as directed.  Do not use ice continuously.

## 2020-10-15 NOTE — ANESTHESIA POSTPROCEDURE EVALUATION
Patient: Denzel Harding    Procedure Summary     Date: 10/15/20 Room / Location: [facility] OR  /  [facility]    Anesthesia Start: 0806 Anesthesia Stop:     Procedure: Right knee arthroscopy with partial medial and lateral meniscectomy and three compartment chondroplasty. (Right Knee) Diagnosis:       Primary localized osteoarthrosis of the knee, right      Tear of medial meniscus of right knee, current, unspecified tear type, initial encounter      (Primary localized osteoarthrosis of the knee, right [M17.11])      (Tear of medial meniscus of right knee, current, unspecified tear type, initial encounter [S83.241A])    Surgeon: South Lewis MD Provider: Amaury Cavanaugh CRNA    Anesthesia Type: MAC ASA Status: 4          Anesthesia Type: MAC    Vitals  No vitals data found for the desired time range.          Post Anesthesia Care and Evaluation    Patient location during evaluation: PHASE II  Patient participation: complete - patient participated  Level of consciousness: awake  Pain score: 0  Pain management: adequate  Airway patency: patent  Anesthetic complications: No anesthetic complications  PONV Status: none  Cardiovascular status: acceptable  Respiratory status: acceptable and face mask  Hydration status: acceptable    Comments: vsss resp spont, reflexes intact, responsive, report given to pacu nurse

## 2020-10-15 NOTE — ANESTHESIA PROCEDURE NOTES
Peripheral Block      Patient reassessed immediately prior to procedure    Patient location during procedure: OR  Reason for block: procedure for pain and at surgeon's request  Performed by  CRNA: Amaury Cavanaugh CRNA  Preanesthetic Checklist  Completed: patient identified, site marked, surgical consent, pre-op evaluation, timeout performed, IV checked, risks and benefits discussed and monitors and equipment checked  Prep:  Pt Position: supine  Sterile barriers:cap, gloves, mask and sterile barriers  Prep: ChloraPrep and air dry 3 min per cclock  Patient monitoring: blood pressure monitoring, continuous pulse oximetry and EKG  Procedure  Sedation:yes  Performed under: MACImages:still images not obtained    Laterality:right  Anesthesia block type: r knee intrarticular block.  Injection Technique:single-shot  Needle Type:Quincke  Needle Gauge:20 G  Resistance on Injection: none          Medications  Comment:Marcaine 0.5% with epi 30 ml and lidocaine 2% with epi 30 ml     Post Assessment  Injection Assessment: negative aspiration for heme, no paresthesia on injection and incremental injection  Patient Tolerance:comfortable throughout block  Complications:no

## 2020-10-15 NOTE — NURSING NOTE
Patient's blood sugar glucose 271. RN informed ABDELRAHMAN Cavanaugh CRNA, no additional orders; per CRNA patient appropriate for discharge.

## 2020-10-22 DIAGNOSIS — J45.50 SEVERE PERSISTENT ASTHMA WITHOUT COMPLICATION: ICD-10-CM

## 2020-10-22 DIAGNOSIS — R06.02 SHORTNESS OF BREATH: ICD-10-CM

## 2020-10-22 RX ORDER — DUPILUMAB 300 MG/2ML
INJECTION, SOLUTION SUBCUTANEOUS
Qty: 4 SYRINGE | Refills: 4 | Status: SHIPPED | OUTPATIENT
Start: 2020-10-22 | End: 2021-05-25

## 2020-10-29 ENCOUNTER — OFFICE VISIT (OUTPATIENT)
Dept: ORTHOPEDIC SURGERY | Facility: CLINIC | Age: 60
End: 2020-10-29

## 2020-10-29 VITALS — WEIGHT: 296 LBS | RESPIRATION RATE: 18 BRPM | BODY MASS INDEX: 42.37 KG/M2 | HEIGHT: 70 IN

## 2020-10-29 DIAGNOSIS — Z98.890 S/P RIGHT KNEE ARTHROSCOPY: Primary | ICD-10-CM

## 2020-10-29 PROCEDURE — 99024 POSTOP FOLLOW-UP VISIT: CPT | Performed by: PHYSICIAN ASSISTANT

## 2020-10-29 NOTE — PROGRESS NOTES
Subjective   Patient ID: Denzel Harding is a 60 y.o.  male  Post-op of the Right Knee (S/P right knee arthroscopy on 10/15/20. Patient states he is doing well. )         History of Present Illness    Patient presents for his initial postop regarding right knee arthroscopy.  Date of surgery 10/15/2020.  Patient states he does have some soreness to the knee, but overall it feels better.  Denies chest pain or shortness of breath.    Past Medical History:   Diagnosis Date   • Anxiety    • Arthritis    • Asthma    • Brachial neuritis 1/19/2017   • Cellulitis     left arm and right leg    • Chest pain    • CHF (congestive heart failure) (CMS/[facility])     Patient reported history May 2020    • COPD (chronic obstructive pulmonary disease) (CMS/[facility])    • Coronary artery disease     Patient reported CABG , 3 vessel   • Depression    • Diabetes mellitus (CMS/[facility])     diagnosed in 1998, checks fsbg 3-4x/day   • Dizziness    • Edema    • Elevated cholesterol    • GERD (gastroesophageal reflux disease)    • H/O chest x-ray 07/04/2015    Mild Left base atelectasis   • H/O echocardiogram 07/05/2015    Normal LVSF. EF of 60-65%. Grade 1 diastolic dysfunction of the LV myocardium. No evidence of pericardial effusion   • H/O exercise stress test    • Hearing loss     No use of hearing aids   • History of fracture     Reported 2 bones in left foot and a finger   • History of herniated intervertebral disc     History of left L5-S1 disc herniation   • History of pneumonia    • History of pneumonia    • Hyperlipidemia    • Hypertension    • Impaired functional mobility, balance, gait, and endurance    • LOM (loss of memory) 1/19/2017   • Lower back pain     Right   • Measles    • MUSE (nonalcoholic steatohepatitis)    • Neuropathy     feet    • EDD treated with BiPAP     BiPAP HS - instructed to bring mask/machine DOS (setting 13 and 5)   • Palpitations    • Pericardial effusion    • Poor dentition     Patient reported missing  multiple teeth   • Renal disorder    • Seizure disorder (CMS/HCC)     focal seizures   • SOB (shortness of breath)    • Staph infection     back after sugery at the incision site    • Stroke (CMS/HCC)     x2 most recent , slight weakness in hands occasionaly    • Wears glasses         Past Surgical History:   Procedure Laterality Date   • BACK SURGERY     • CARDIAC CATHETERIZATION     • CARDIAC CATHETERIZATION N/A 8/11/2017    Procedure: Coronary angiography;  Surgeon: Dallas Kim MD;  Location: [facility] CATH INVASIVE LOCATION;  Service:    • CARDIAC CATHETERIZATION N/A 8/11/2017    Procedure: Left Heart Cath;  Surgeon: Dallas Kim MD;  Location: [facility] CATH INVASIVE LOCATION;  Service:    • CARDIAC CATHETERIZATION N/A 8/11/2017    Procedure: Left ventriculography;  Surgeon: Dallas Kim MD;  Location: [facility] CATH INVASIVE LOCATION;  Service:    • CARDIAC CATHETERIZATION      2002 x1 stent,  x1 stent   • CARDIOVASCULAR STRESS TEST  07/03/2017    WITH DR KIM AT [facility]   • CARPAL TUNNEL RELEASE Right    • CATARACT EXTRACTION W/ INTRAOCULAR LENS IMPLANT Right 6/12/2017    Procedure: CATARACT PHACO EXTRACTION WITH INTRAOCULAR LENS IMPLANT RIGHT ;  Surgeon: Natalie Cao MD;  Location: [facility] OR;  Service:    • CATARACT EXTRACTION W/ INTRAOCULAR LENS IMPLANT Left 7/10/2017    Procedure: CATARACT PHACO EXTRACTION WITH INTRAOCULAR LENS IMPLANT LEFT;  Surgeon: Natalie Cao MD;  Location: [facility] OR;  Service:    • CHOLECYSTECTOMY     • COLONOSCOPY     • COLONOSCOPY N/A 7/2/2020    Procedure: COLONOSCOPY;  Surgeon: Petra Crowell MD;  Location: [facility] ENDOSCOPY;  Service: Gastroenterology;  Laterality: N/A;  poor prep   • CORONARY ANGIOPLASTY WITH STENT PLACEMENT      X1-LAD   • CORONARY ARTERY BYPASS GRAFT N/A 8/15/2017    Procedure: CORONARY ARTERY BYPASS GRAFTING x 3 UTILIZING THE LEFT INTERNAL MAMMARY ARTERY WITH ENDOSCOPIC VEIN HARVESTING OF THE RIGHT GREATER SAPHENOUS VEIN, HAMLET,  LAD ENDARECTOMY;  Surgeon: London Damon MD;  Location:  [facility] OR;  Service:    • ENDOSCOPY     • EYE CAPSULOTOMY WITH LASER Right 11/25/2019    Procedure: EYE CAPSULOTOMY WITH LASER RIGHT;  Surgeon: Natalie Cao MD;  Location:  [facility];  Service: Ophthalmology   • EYE CAPSULOTOMY WITH LASER Left 12/9/2019    Procedure: EYE CAPSULOTOMY WITH LASER LEFT;  Surgeon: Natalie Cao MD;  Location:  [facility];  Service: Ophthalmology   • EYE SURGERY      cataract surgery both eyes   • INTERVENTIONAL RADIOLOGY PROCEDURE Bilateral 12/31/2019    Procedure: Carotid Cerebral Angiogram;  Surgeon: Damian Dubios MD;  Location:  [facility] CATH INVASIVE LOCATION;  Service: Interventional Radiology   • KNEE ARTHROSCOPY Bilateral    • KNEE ARTHROSCOPY Right 2010    Dr Lewis   • KNEE ARTHROSCOPY Left 2008    Dr Jameson   • KNEE MENISCECTOMY Right 10/15/2020    Procedure: Right knee arthroscopy with partial medial and lateral meniscectomy and three compartment chondroplasty.;  Surgeon: South Lewis MD;  Location:  [facility];  Service: Orthopedics;  Laterality: Right;   • MOUTH SURGERY      Oral extractions   • NEUROPLASTY / TRANSPOSITION ULNAR NERVE AT ELBOW Left    • OTHER SURGICAL HISTORY      Foraminotomy and discectomy   • PERICARDIAL WINDOW N/A 8/25/2017    Procedure: PERICARDIAL WINDOW;  Surgeon: Freeman Phillips MD;  Location:  [facility] OR;  Service:        Family History   Problem Relation Age of Onset   • Hypertension Mother    • Arthritis Mother    • Alcohol abuse Father    • Heart disease Other    • Stroke Other    • Hypertension Other    • Other Other         Neurologic disorder   • Parkinsonism Other    • Stroke Other    • Heart disease Other    • Hypertension Other    • Heart disease Other    • Stroke Other    • Hypertension Other        Social History     Socioeconomic History   • Marital status:      Spouse name: Not on file   • Number of children: 1   • Years of education: Not on file   • Highest education  level: Not on file   Occupational History   • Occupation: [employer]     Employer: DISABLED     Comment: Disabled since -Back Problems   Tobacco Use   • Smoking status: Former Smoker     Packs/day: 1.00     Years: 10.00     Pack years: 10.00     Types: Cigarettes     Quit date: 1998     Years since quittin.8   • Smokeless tobacco: Former User     Types: Snuff     Quit date:    Substance and Sexual Activity   • Alcohol use: Yes     Frequency: Monthly or less     Drinks per session: 3 or 4     Comment: 3 drinks a year   • Drug use: No   • Sexual activity: Yes     Partners: Female   Social History Narrative    Caffeine use: 0-1 serving daily.    Patient lives at home with wife.          Current Outpatient Medications:   •  albuterol sulfate HFA (Ventolin HFA) 108 (90 Base) MCG/ACT inhaler, Inhale 2 puffs Every 4 (Four) Hours As Needed for Wheezing or Shortness of Air., Disp: 1 inhaler, Rfl: 4  •  amitriptyline (ELAVIL) 25 MG tablet, Take 3 tablets by mouth every night at bedtime., Disp: 270 tablet, Rfl: 0  •  Asmanex  MCG/ACT aerosol, Inhale 1 puff 2 (Two) Times a Day., Disp: 1 inhaler, Rfl: 5  •  aspirin 81 MG EC tablet, Take 81 mg by mouth Daily., Disp: , Rfl:   •  atorvastatin (LIPITOR) 80 MG tablet, Take 1 tablet by mouth Every Night., Disp: 30 tablet, Rfl: 5  •  budesonide-formoterol (Symbicort) 80-4.5 MCG/ACT inhaler, Inhale 2 puffs 2 (Two) Times a Day. Rinse mouth with water after use., Disp: 1 inhaler, Rfl: 5  •  cloNIDine (Catapres) 0.1 MG tablet, Take 1 tablet by mouth every 6 hours as needed for SBP > 170 or DBP > 100, Disp: 120 tablet, Rfl: 0  •  clopidogrel (PLAVIX) 75 MG tablet, Take 75 mg by mouth Daily., Disp: , Rfl:   •  coenzyme Q10 100 MG capsule, Take 100 mg by mouth Daily., Disp: , Rfl:   •  CYCLOSET 0.8 MG tablet, Take 3.2 mg by mouth Every Morning., Disp: , Rfl: 0  •  Dupixent 300 MG/2ML solution prefilled syringe, INJECT 300 MG SUBCUTANEOUSLY EVERY  OTHER WEEK, Disp: 4 syringe, Rfl: 4  •  gabapentin (NEURONTIN) 600 MG tablet, Take 600 mg by mouth 3 (Three) Times a Day., Disp: , Rfl: 0  •  guaifenesin (ROBITUSSIN) 100 MG/5ML liquid, Take 10 mL by mouth Every 4 (Four) Hours As Needed for Cough., Disp: 118 mL, Rfl: 0  •  HUMALOG KWIKPEN 100 UNIT/ML solution pen-injector, Inject 10 Units under the skin 3 (Three) Times a Day With Meals. Follows SSI From PCP (Patient taking differently: Inject 25 Units under the skin into the appropriate area as directed 3 (Three) Times a Day With Meals. Follows SSI From PCP), Disp: , Rfl: 0  •  HYDROcodone-acetaminophen (NORCO) 7.5-325 MG per tablet, Take 1 tablet by mouth Every 8 (Eight) Hours As Needed for pain., Disp: 21 tablet, Rfl: 0  •  ipratropium-albuterol (DUO-NEB) 0.5-2.5 mg/3 ml nebulizer, Take 3 mL by nebulization 4 (Four) Times a Day As Needed for Wheezing or Shortness of Air., Disp: 360 mL, Rfl: 5  •  levocetirizine (XYZAL) 5 MG tablet, Take 1 tablet by mouth Every Evening., Disp: 90 tablet, Rfl: 1  •  meclizine (ANTIVERT) 25 MG tablet, Take 1 tablet by mouth 3 (Three) Times a Day As Needed for dizziness., Disp: 10 tablet, Rfl: 0  •  metoprolol tartrate (LOPRESSOR) 25 MG tablet, Take 1/2 tablet by mouth 2 (Two) times a day for 7 days, then 1/2 tablet once a day for 7 days,  then stop (Patient taking differently: Take 25 mg by mouth 2 (Two) Times a Day.), Disp: 11 tablet, Rfl: 0  •  omeprazole (priLOSEC) 20 MG capsule, Take 1 capsule by mouth Daily., Disp: 30 capsule, Rfl: 5  •  OZEMPIC, 0.25 OR 0.5 MG/DOSE, 2 MG/1.5ML solution pen-injector, Inject 0.5 mg under the skin into the appropriate area as directed 1 (One) Time Per Week., Disp: , Rfl:   •  promethazine (PHENERGAN) 25 MG tablet, Take 1 tablet by mouth Every 6 (Six) Hours As Needed for Nausea or Vomiting., Disp: 20 tablet, Rfl: 0  •  Tiotropium Bromide Monohydrate (Spiriva Respimat) 1.25 MCG/ACT aerosol solution inhaler, Inhale 2 puffs Daily., Disp: 1 inhaler,  "Rfl: 5  •  tiZANidine (ZANAFLEX) 4 MG tablet, Take 4 mg by mouth Every 6 (Six) Hours As Needed for Muscle Spasms., Disp: , Rfl:   •  TOUJEO SOLOSTAR 300 UNIT/ML solution pen-injector, Inject 60 Units as directed Daily. (Patient taking differently: Inject 120 Units as directed 2 (two) times a day.), Disp: , Rfl: 0  •  TURMERIC PO, Take 500 mg by mouth 2 (Two) Times a Day., Disp: , Rfl:   •  vitamin C (ASCORBIC ACID) 500 MG tablet, Take 500 mg by mouth Daily., Disp: , Rfl:   •  Vortioxetine HBr (Trintellix) 20 MG tablet, Take 20 mg by mouth Daily., Disp: 30 tablet, Rfl: 5  •  zafirlukast (Accolate) 20 MG tablet, Take 1 tablet by mouth 2 (Two) Times a Day., Disp: 60 tablet, Rfl: 5    Allergies   Allergen Reactions   • Sulfa Antibiotics Anaphylaxis, Nausea And Vomiting and Delirium   • Invokana [Canagliflozin] Unknown - Low Severity     DKA   • Codeine Nausea And Vomiting     Can only take with Phenergan   • Morphine Unknown - Low Severity     Does not work. It causes pain       Review of Systems   Constitutional: Negative for fever.   HENT: Negative for dental problem and voice change.    Eyes: Negative for visual disturbance.   Respiratory: Negative for shortness of breath.    Cardiovascular: Negative for chest pain.   Gastrointestinal: Negative for abdominal pain.   Genitourinary: Negative for dysuria.   Musculoskeletal: Positive for arthralgias. Negative for gait problem and joint swelling.   Skin: Negative for rash.   Neurological: Negative for speech difficulty.   Hematological: Does not bruise/bleed easily.   Psychiatric/Behavioral: Negative for confusion.       I have reviewed the medical and surgical history, family history, social history, medications, and/or allergies, and the review of systems of this report.    Objective   Resp 18   Ht 177.8 cm (70\")   Wt 134 kg (296 lb)   BMI 42.47 kg/m²    Physical Exam  Ortho Exam    Extremity DVT signs are negative on physical exam with negative Don sign, no calf " pain, no palpable cords and no skin tone change   Neurologic Exam     The staples were removed.  Steri-Strips were reapplied   no signs of infection to the knee incisions    Assessment/Plan   Independent Review of Radiographic Studies:    No new imaging done today.      Procedures       Diagnoses and all orders for this visit:    1. S/P right knee arthroscopy (Primary)  -     Ambulatory Referral to Physical Therapy Evaluate and treat, POST OP, Ortho       Orthopedic activities reviewed and patient expressed appreciation  Discussion of orthopedic goals  Risk, benefits, and merits of treatment alternatives reviewed with the patient and questions answered    Recommendations/Plan:  Exercise, medications, injections, other patient advice, and return appointment as noted.  Referral: Physical and Occupational Therapy referral.  Patient is encouraged to call or return for any issues or concerns.    Follow up in 2 - 3 months      Patient agreeable to call or return sooner for any concerns.               EMR Dragon-transcription disclaimer:  This encounter note is an electronic transcription of spoken language to printed text.  Electronic transcription of spoken language may permit erroneous or at times nonsensical words or phrases to be inadvertently transcribed.  Although I have reviewed the note for such errors, some may still exist

## 2020-11-01 ENCOUNTER — EPISODE CHANGES (OUTPATIENT)
Dept: CASE MANAGEMENT | Facility: OTHER | Age: 60
End: 2020-11-01

## 2020-11-06 RX ORDER — AMITRIPTYLINE HYDROCHLORIDE 25 MG/1
75 TABLET, FILM COATED ORAL
Qty: 270 TABLET | Refills: 0 | Status: SHIPPED | OUTPATIENT
Start: 2020-11-06 | End: 2021-02-01

## 2020-11-06 NOTE — TELEPHONE ENCOUNTER
Caller: [id number] - [place], [place] - [address] AT [address] & [address] [facility] [address] [facility] - [phone] [facility] [phone] FX    Relationship: Pharmacy    Best call back number: [phone]    Medication needed:   Requested Prescriptions     Pending Prescriptions Disp Refills   • levocetirizine (XYZAL) 5 MG tablet 90 tablet 1     Sig: Take 1 tablet by mouth Every Evening.       When do you need the refill by: 11/06/2020    What details did the patient provide when requesting the medication: Pharmacy sates that the patient is running low    Does the patient have less than a 3 day supply:  [x] Yes  [] No    What is the patient's preferred pharmacy: [id number] - [place], [place] - [address] [place] [address] AT [facility] & [facility] - [phone] [facility] [phone] FX

## 2020-11-09 RX ORDER — LEVOCETIRIZINE DIHYDROCHLORIDE 5 MG/1
5 TABLET, FILM COATED ORAL EVERY EVENING
Qty: 90 TABLET | Refills: 3 | Status: SHIPPED | OUTPATIENT
Start: 2020-11-09 | End: 2021-07-01 | Stop reason: SDUPTHER

## 2020-11-12 LAB
ALBUMIN SERPL-MCNC: 3.8 G/DL (ref 3.5–5.2)
ALBUMIN/GLOB SERPL: 1.4 G/DL
ALP SERPL-CCNC: 95 U/L (ref 39–117)
ALT SERPL W P-5'-P-CCNC: 51 U/L (ref 1–41)
ANION GAP SERPL CALCULATED.3IONS-SCNC: 13.2 MMOL/L (ref 5–15)
AST SERPL-CCNC: 45 U/L (ref 1–40)
BILIRUB SERPL-MCNC: 0.6 MG/DL (ref 0–1.2)
BUN SERPL-MCNC: 8 MG/DL (ref 6–20)
BUN/CREAT SERPL: 11 (ref 7–25)
CALCIUM SPEC-SCNC: 9.1 MG/DL (ref 8.6–10.5)
CHLORIDE SERPL-SCNC: 97 MMOL/L (ref 98–107)
CO2 SERPL-SCNC: 24.8 MMOL/L (ref 22–29)
CREAT SERPL-MCNC: 0.73 MG/DL (ref 0.76–1.27)
GFR SERPL CREATININE-BSD FRML MDRD: 110 ML/MIN/1.73
GLOBULIN UR ELPH-MCNC: 2.8 GM/DL
GLUCOSE SERPL-MCNC: 439 MG/DL (ref 65–99)
POTASSIUM SERPL-SCNC: 4.2 MMOL/L (ref 3.5–5.2)
PROT SERPL-MCNC: 6.6 G/DL (ref 6–8.5)
SODIUM SERPL-SCNC: 135 MMOL/L (ref 136–145)

## 2020-11-15 ENCOUNTER — HOSPITAL ENCOUNTER (EMERGENCY)
Facility: HOSPITAL | Age: 60
Discharge: HOME | End: 2020-11-15
Attending: STUDENT IN AN ORGANIZED HEALTH CARE EDUCATION/TRAINING PROGRAM | Admitting: STUDENT IN AN ORGANIZED HEALTH CARE EDUCATION/TRAINING PROGRAM

## 2020-11-15 ENCOUNTER — APPOINTMENT (OUTPATIENT)
Dept: GENERAL RADIOLOGY | Facility: HOSPITAL | Age: 60
End: 2020-11-15

## 2020-11-15 VITALS
DIASTOLIC BLOOD PRESSURE: 85 MMHG | HEART RATE: 85 BPM | BODY MASS INDEX: 42.23 KG/M2 | SYSTOLIC BLOOD PRESSURE: 156 MMHG | OXYGEN SATURATION: 96 % | HEIGHT: 70 IN | WEIGHT: 295 LBS | RESPIRATION RATE: 18 BRPM | TEMPERATURE: 98.3 F

## 2020-11-15 DIAGNOSIS — J44.1 COPD EXACERBATION (HCC): Primary | ICD-10-CM

## 2020-11-15 LAB
ALBUMIN SERPL-MCNC: 4.1 G/DL (ref 3.5–5.2)
ALBUMIN/GLOB SERPL: 1.3 G/DL
ALP SERPL-CCNC: 115 U/L (ref 39–117)
ALT SERPL W P-5'-P-CCNC: 76 U/L (ref 1–41)
ANION GAP SERPL CALCULATED.3IONS-SCNC: 10.6 MMOL/L (ref 5–15)
AST SERPL-CCNC: 54 U/L (ref 1–40)
BASOPHILS # BLD AUTO: 0.06 10*3/MM3 (ref 0–0.2)
BASOPHILS NFR BLD AUTO: 0.8 % (ref 0–1.5)
BILIRUB SERPL-MCNC: 0.9 MG/DL (ref 0–1.2)
BUN SERPL-MCNC: 9 MG/DL (ref 6–20)
BUN/CREAT SERPL: 12.2 (ref 7–25)
CALCIUM SPEC-SCNC: 9.5 MG/DL (ref 8.6–10.5)
CHLORIDE SERPL-SCNC: 99 MMOL/L (ref 98–107)
CO2 SERPL-SCNC: 26.4 MMOL/L (ref 22–29)
CREAT SERPL-MCNC: 0.74 MG/DL (ref 0.76–1.27)
DEPRECATED RDW RBC AUTO: 41 FL (ref 37–54)
EOSINOPHIL # BLD AUTO: 0.4 10*3/MM3 (ref 0–0.4)
EOSINOPHIL NFR BLD AUTO: 5.3 % (ref 0.3–6.2)
ERYTHROCYTE [DISTWIDTH] IN BLOOD BY AUTOMATED COUNT: 15.5 % (ref 12.3–15.4)
GFR SERPL CREATININE-BSD FRML MDRD: 108 ML/MIN/1.73
GLOBULIN UR ELPH-MCNC: 3.1 GM/DL
GLUCOSE SERPL-MCNC: 305 MG/DL (ref 65–99)
HCT VFR BLD AUTO: 42.6 % (ref 37.5–51)
HGB BLD-MCNC: 13.3 G/DL (ref 13–17.7)
HOLD SPECIMEN: NORMAL
HOLD SPECIMEN: NORMAL
IMM GRANULOCYTES # BLD AUTO: 0.05 10*3/MM3 (ref 0–0.05)
IMM GRANULOCYTES NFR BLD AUTO: 0.7 % (ref 0–0.5)
LYMPHOCYTES # BLD AUTO: 1.55 10*3/MM3 (ref 0.7–3.1)
LYMPHOCYTES NFR BLD AUTO: 20.6 % (ref 19.6–45.3)
MCH RBC QN AUTO: 23 PG (ref 26.6–33)
MCHC RBC AUTO-ENTMCNC: 31.2 G/DL (ref 31.5–35.7)
MCV RBC AUTO: 73.7 FL (ref 79–97)
MICROCYTES BLD QL: NORMAL
MONOCYTES # BLD AUTO: 0.47 10*3/MM3 (ref 0.1–0.9)
MONOCYTES NFR BLD AUTO: 6.2 % (ref 5–12)
NEUTROPHILS NFR BLD AUTO: 5 10*3/MM3 (ref 1.7–7)
NEUTROPHILS NFR BLD AUTO: 66.4 % (ref 42.7–76)
NRBC BLD AUTO-RTO: 0 /100 WBC (ref 0–0.2)
NT-PROBNP SERPL-MCNC: 49.2 PG/ML (ref 0–900)
PLATELET # BLD AUTO: 256 10*3/MM3 (ref 140–450)
PMV BLD AUTO: 10.2 FL (ref 6–12)
POTASSIUM SERPL-SCNC: 3.8 MMOL/L (ref 3.5–5.2)
PROCALCITONIN SERPL-MCNC: 0.09 NG/ML (ref 0–0.25)
PROT SERPL-MCNC: 7.2 G/DL (ref 6–8.5)
RBC # BLD AUTO: 5.78 10*6/MM3 (ref 4.14–5.8)
SARS-COV-2 RNA PNL SPEC NAA+PROBE: NOT DETECTED
SMALL PLATELETS BLD QL SMEAR: ADEQUATE
SODIUM SERPL-SCNC: 136 MMOL/L (ref 136–145)
TROPONIN T SERPL-MCNC: <0.01 NG/ML (ref 0–0.03)
WBC # BLD AUTO: 7.53 10*3/MM3 (ref 3.4–10.8)
WBC MORPH BLD: NORMAL
WHOLE BLOOD HOLD SPECIMEN: NORMAL
WHOLE BLOOD HOLD SPECIMEN: NORMAL

## 2020-11-15 PROCEDURE — 71045 X-RAY EXAM CHEST 1 VIEW: CPT

## 2020-11-15 PROCEDURE — 87635 SARS-COV-2 COVID-19 AMP PRB: CPT | Performed by: STUDENT IN AN ORGANIZED HEALTH CARE EDUCATION/TRAINING PROGRAM

## 2020-11-15 PROCEDURE — 84145 PROCALCITONIN (PCT): CPT | Performed by: STUDENT IN AN ORGANIZED HEALTH CARE EDUCATION/TRAINING PROGRAM

## 2020-11-15 PROCEDURE — 80053 COMPREHEN METABOLIC PANEL: CPT | Performed by: STUDENT IN AN ORGANIZED HEALTH CARE EDUCATION/TRAINING PROGRAM

## 2020-11-15 PROCEDURE — 94640 AIRWAY INHALATION TREATMENT: CPT

## 2020-11-15 PROCEDURE — 93005 ELECTROCARDIOGRAM TRACING: CPT | Performed by: STUDENT IN AN ORGANIZED HEALTH CARE EDUCATION/TRAINING PROGRAM

## 2020-11-15 PROCEDURE — 84484 ASSAY OF TROPONIN QUANT: CPT | Performed by: STUDENT IN AN ORGANIZED HEALTH CARE EDUCATION/TRAINING PROGRAM

## 2020-11-15 PROCEDURE — 99283 EMERGENCY DEPT VISIT LOW MDM: CPT

## 2020-11-15 PROCEDURE — 83880 ASSAY OF NATRIURETIC PEPTIDE: CPT | Performed by: STUDENT IN AN ORGANIZED HEALTH CARE EDUCATION/TRAINING PROGRAM

## 2020-11-15 PROCEDURE — 25010000002 METHYLPREDNISOLONE PER 125 MG: Performed by: STUDENT IN AN ORGANIZED HEALTH CARE EDUCATION/TRAINING PROGRAM

## 2020-11-15 PROCEDURE — 85007 BL SMEAR W/DIFF WBC COUNT: CPT | Performed by: STUDENT IN AN ORGANIZED HEALTH CARE EDUCATION/TRAINING PROGRAM

## 2020-11-15 PROCEDURE — 96375 TX/PRO/DX INJ NEW DRUG ADDON: CPT

## 2020-11-15 PROCEDURE — 96365 THER/PROPH/DIAG IV INF INIT: CPT

## 2020-11-15 PROCEDURE — 25010000002 MAGNESIUM SULFATE 2 GM/50ML SOLUTION: Performed by: STUDENT IN AN ORGANIZED HEALTH CARE EDUCATION/TRAINING PROGRAM

## 2020-11-15 PROCEDURE — 85025 COMPLETE CBC W/AUTO DIFF WBC: CPT | Performed by: STUDENT IN AN ORGANIZED HEALTH CARE EDUCATION/TRAINING PROGRAM

## 2020-11-15 RX ORDER — SODIUM CHLORIDE 0.9 % (FLUSH) 0.9 %
10 SYRINGE (ML) INJECTION AS NEEDED
Status: DISCONTINUED | OUTPATIENT
Start: 2020-11-15 | End: 2020-11-15 | Stop reason: HOSPADM

## 2020-11-15 RX ORDER — METHYLPREDNISOLONE SODIUM SUCCINATE 125 MG/2ML
125 INJECTION, POWDER, LYOPHILIZED, FOR SOLUTION INTRAMUSCULAR; INTRAVENOUS ONCE
Status: COMPLETED | OUTPATIENT
Start: 2020-11-15 | End: 2020-11-15

## 2020-11-15 RX ORDER — MAGNESIUM SULFATE HEPTAHYDRATE 40 MG/ML
2 INJECTION, SOLUTION INTRAVENOUS ONCE
Status: COMPLETED | OUTPATIENT
Start: 2020-11-15 | End: 2020-11-15

## 2020-11-15 RX ORDER — PREDNISONE 20 MG/1
60 TABLET ORAL DAILY
Qty: 15 TABLET | Refills: 0 | Status: SHIPPED | OUTPATIENT
Start: 2020-11-15 | End: 2021-01-14

## 2020-11-15 RX ORDER — ALBUTEROL SULFATE 90 UG/1
8 AEROSOL, METERED RESPIRATORY (INHALATION) ONCE
Status: COMPLETED | OUTPATIENT
Start: 2020-11-15 | End: 2020-11-15

## 2020-11-15 RX ADMIN — MAGNESIUM SULFATE IN WATER 2 G: 40 INJECTION, SOLUTION INTRAVENOUS at 15:46

## 2020-11-15 RX ADMIN — METHYLPREDNISOLONE SODIUM SUCCINATE 125 MG: 125 INJECTION, POWDER, FOR SOLUTION INTRAMUSCULAR; INTRAVENOUS at 15:46

## 2020-11-15 RX ADMIN — ALBUTEROL SULFATE 8 PUFF: 90 AEROSOL, METERED RESPIRATORY (INHALATION) at 15:46

## 2020-11-15 NOTE — ED PROVIDER NOTES
"Subjective   60-year-old male who presents with progressively worsening shortness of breath of the past 3 days.  Patient states he has a productive cough with a thick sputum.  States that sputum is so sticky that you could \"patch a roof with it\".  Patient denies fever, chills or sweats.  States he has some chest pain when he coughs but he does not have any chest pain at rest.  Patient does have a history of COPD as well as congestive heart failure.  States he has been Covid tested 7 times due to his comorbidities and has been negative every time.  Right now symptoms are worse with exertion and nothing makes better.  States he has tried all of his medications at home and since it was not helping he thought he would come in to be sure he does not have pneumonia.          Review of Systems   All other systems reviewed and are negative.      Past Medical History:   Diagnosis Date   • Anxiety    • Arthritis    • Asthma    • Brachial neuritis 1/19/2017   • Cellulitis     left arm and right leg    • Chest pain    • CHF (congestive heart failure) (CMS/[facility])     Patient reported history May 2020    • COPD (chronic obstructive pulmonary disease) (CMS/[facility])    • Coronary artery disease     Patient reported CABG , 3 vessel   • Depression    • Diabetes mellitus (CMS/[facility])     diagnosed in 1998, checks fsbg 3-4x/day   • Dizziness    • Edema    • Elevated cholesterol    • GERD (gastroesophageal reflux disease)    • H/O chest x-ray 07/04/2015    Mild Left base atelectasis   • H/O echocardiogram 07/05/2015    Normal LVSF. EF of 60-65%. Grade 1 diastolic dysfunction of the LV myocardium. No evidence of pericardial effusion   • H/O exercise stress test    • Hearing loss     No use of hearing aids   • History of fracture     Reported 2 bones in left foot and a finger   • History of herniated intervertebral disc     History of left L5-S1 disc herniation   • History of pneumonia    • History of pneumonia    • Hyperlipidemia    • " Hypertension    • Impaired functional mobility, balance, gait, and endurance    • LOM (loss of memory) 1/19/2017   • Lower back pain     Right   • Measles    • MUSE (nonalcoholic steatohepatitis)    • Neuropathy     feet    • EDD treated with BiPAP     BiPAP HS - instructed to bring mask/machine DOS (setting 13 and 5)   • Palpitations    • Pericardial effusion    • Poor dentition     Patient reported missing multiple teeth   • Renal disorder    • Seizure disorder (CMS/HCC)     focal seizures   • SOB (shortness of breath)    • Staph infection     back after sugery at the incision site    • Stroke (CMS/HCC)     x2 most recent , slight weakness in hands occasionaly    • Wears glasses        Allergies   Allergen Reactions   • Sulfa Antibiotics Anaphylaxis, Nausea And Vomiting and Delirium   • Invokana [Canagliflozin] Unknown - Low Severity     DKA   • Codeine Nausea And Vomiting     Can only take with Phenergan   • Morphine Unknown - Low Severity     Does not work. It causes pain       Past Surgical History:   Procedure Laterality Date   • BACK SURGERY     • CARDIAC CATHETERIZATION     • CARDIAC CATHETERIZATION N/A 8/11/2017    Procedure: Coronary angiography;  Surgeon: Dallas Kim MD;  Location: [facility] INVASIVE LOCATION;  Service:    • CARDIAC CATHETERIZATION N/A 8/11/2017    Procedure: Left Heart Cath;  Surgeon: Dallas Kim MD;  Location: [facility] INVASIVE LOCATION;  Service:    • CARDIAC CATHETERIZATION N/A 8/11/2017    Procedure: Left ventriculography;  Surgeon: Dallas Kim MD;  Location: [facility] INVASIVE LOCATION;  Service:    • CARDIAC CATHETERIZATION      2002 x1 stent,  x1 stent   • CARDIOVASCULAR STRESS TEST  07/03/2017    WITH DR KIM AT [facility]   • CARPAL TUNNEL RELEASE Right    • CATARACT EXTRACTION W/ INTRAOCULAR LENS IMPLANT Right 6/12/2017    Procedure: CATARACT PHACO EXTRACTION WITH INTRAOCULAR LENS IMPLANT RIGHT ;  Surgeon: Natalie Cao MD;  Location: [facility]  OR;  Service:    • CATARACT EXTRACTION W/ INTRAOCULAR LENS IMPLANT Left 7/10/2017    Procedure: CATARACT PHACO EXTRACTION WITH INTRAOCULAR LENS IMPLANT LEFT;  Surgeon: Natalie Cao MD;  Location: [facility] OR;  Service:    • CHOLECYSTECTOMY     • COLONOSCOPY     • COLONOSCOPY N/A 7/2/2020    Procedure: COLONOSCOPY;  Surgeon: Petra Crowell MD;  Location: [facility] ENDOSCOPY;  Service: Gastroenterology;  Laterality: N/A;  poor prep   • CORONARY ANGIOPLASTY WITH STENT PLACEMENT      X1-LAD   • CORONARY ARTERY BYPASS GRAFT N/A 8/15/2017    Procedure: CORONARY ARTERY BYPASS GRAFTING x 3 UTILIZING THE LEFT INTERNAL MAMMARY ARTERY WITH ENDOSCOPIC VEIN HARVESTING OF THE RIGHT GREATER SAPHENOUS VEIN, HAMLET, LAD ENDARECTOMY;  Surgeon: London Damon MD;  [facility];  Service:    • ENDOSCOPY     • EYE CAPSULOTOMY WITH LASER Right 11/25/2019    Procedure: EYE CAPSULOTOMY WITH LASER RIGHT;  Surgeon: Natalie Cao MD;  Location: [facility] OR;  Service: Ophthalmology   • EYE CAPSULOTOMY WITH LASER Left 12/9/2019    Procedure: EYE CAPSULOTOMY WITH LASER LEFT;  Surgeon: Natalie Cao MD;  Location: [facility] OR;  Service: Ophthalmology   • EYE SURGERY      cataract surgery both eyes   • INTERVENTIONAL RADIOLOGY PROCEDURE Bilateral 12/31/2019    Procedure: Carotid Cerebral Angiogram;  Surgeon: Damian Dubois MD;  [facility] CATH INVASIVE LOCATION;  Service: Interventional Radiology   • KNEE ARTHROSCOPY Bilateral    • KNEE ARTHROSCOPY Right 2010    Dr Lewis   • KNEE ARTHROSCOPY Left 2008    Dr Jameson   • KNEE MENISCECTOMY Right 10/15/2020    Procedure: Right knee arthroscopy with partial medial and lateral meniscectomy and three compartment chondroplasty.;  Surgeon: South Lewis MD;  Location: [facility] OR;  Service: Orthopedics;  Laterality: Right;   • MOUTH SURGERY      Oral extractions   • NEUROPLASTY / TRANSPOSITION ULNAR NERVE AT ELBOW Left    • OTHER SURGICAL HISTORY      Foraminotomy and discectomy   •  PERICARDIAL WINDOW N/A 2017    Procedure: PERICARDIAL WINDOW;  Surgeon: Freeman Phillips MD;  Location: [facility];  Service:        Family History   Problem Relation Age of Onset   • Hypertension Mother    • Arthritis Mother    • Alcohol abuse Father    • Heart disease Other    • Stroke Other    • Hypertension Other    • Other Other         Neurologic disorder   • Parkinsonism Other    • Stroke Other    • Heart disease Other    • Hypertension Other    • Heart disease Other    • Stroke Other    • Hypertension Other        Social History     Socioeconomic History   • Marital status:      Spouse name: Not on file   • Number of children: 1   • Years of education: Not on file   • Highest education level: Not on file   Occupational History   • Occupation: [employer]     Employer: DISABLED     Comment: Disabled since -Back Problems   Tobacco Use   • Smoking status: Former Smoker     Packs/day: 1.00     Years: 10.00     Pack years: 10.00     Types: Cigarettes     Quit date: 1998     Years since quittin.8   • Smokeless tobacco: Former User     Types: Snuff     Quit date:    Substance and Sexual Activity   • Alcohol use: Yes     Frequency: Monthly or less     Drinks per session: 3 or 4     Comment: 3 drinks a year   • Drug use: No   • Sexual activity: Yes     Partners: Female   Social History Narrative    Caffeine use: 0-1 serving daily.    Patient lives at home with wife.            Objective   Physical Exam  Vitals signs and nursing note reviewed.     GEN: No acute distress  Head: Normocephalic, atraumatic  Eyes: Pupils equal round reactive to light  ENT: Posterior pharynx normal in appearance, oral mucosa is moist  Chest: Nontender to palpation, well-healed sternotomy scar  Cardiovascular: Regular rate  Lungs: Coarse breath sounds with faint wheezes bilaterally, respirations 18/minute, no respiratory distress  Abdomen: Soft, nontender, nondistended, no peritoneal signs  Neuro:  GCS 15  Psych: Mood and affect are appropriate    Procedures           ED Course  ED Course as of Nov 15 1811   Sun Nov 15, 2020   1548 EKG shows sinus rhythm with a rate of 86.  Nonspecific T waves in leads I and aVL.  Abnormal EKG.  Interpreted by me.  This EKG is similar to EKG from October 13.    [DT]   1600 Procalcitonin: 0.09 [DT]   1600 WBC: 7.53 [DT]   1600 proBNP: 49.2 [DT]   1601 Troponin T: <0.010 [DT]   1810 XR Chest 1 View  Order: [id number]  Status:  Final result   Visible to patient:  No (not released) Next appt:  01/06/2021 at 01:15 PM in Orthopedic Surgery (Ollie Allen PA-C)  Details      Reading Physician Reading Date Result Priority  Lalito Grissom DO  [phone] 11/15/2020     Narrative & Impression    PORTABLE CHEST          HISTORY: Shortness of breath.     COMPARISON: 10/13/2020.     FINDINGS: Cardiomegaly. Median sternotomy changes. Grossly clear lungs.  No effusion or pneumothorax.         IMPRESSION:  No acute cardiopulmonary process .     This report was finalized on 11/15/2020 4:13 PM by Dr Lalito Grissom DO.            [DT]      ED Course User Index  [DT] Ottoniel Willson MD                                           MDM  Number of Diagnoses or Management Options  COPD exacerbation (CMS/HCC):   Diagnosis management comments: Differential diagnosis for this patient would include COPD, asthma, bronchitis, pneumonia, congestive heart failure, acute coronary syndrome, anxiety, or other concerns.       Amount and/or Complexity of Data Reviewed  Clinical lab tests: ordered and reviewed  Tests in the radiology section of CPT®: reviewed  Decide to obtain previous medical records or to obtain history from someone other than the patient: yes  Obtain history from someone other than the patient: yes  Review and summarize past medical records: yes  Independent visualization of images, tracings, or specimens: yes        Final diagnoses:   COPD exacerbation (CMS/HCC)            Demetris  Ottoniel WILKS MD  11/15/20 [id number]

## 2020-11-24 RX ORDER — VORTIOXETINE 20 MG/1
TABLET, FILM COATED ORAL
Qty: 30 TABLET | Refills: 5 | OUTPATIENT
Start: 2020-11-24

## 2020-12-01 ENCOUNTER — HOSPITAL ENCOUNTER (OUTPATIENT)
Dept: ULTRASOUND IMAGING | Facility: HOSPITAL | Age: 60
Discharge: HOME | End: 2020-12-01
Admitting: INTERNAL MEDICINE

## 2020-12-01 DIAGNOSIS — R79.89 ELEVATED LFTS: ICD-10-CM

## 2020-12-01 DIAGNOSIS — K76.0 FATTY LIVER: ICD-10-CM

## 2020-12-01 PROCEDURE — 76700 US EXAM ABDOM COMPLETE: CPT

## 2020-12-03 ENCOUNTER — OFFICE VISIT (OUTPATIENT)
Dept: INTERNAL MEDICINE | Facility: CLINIC | Age: 60
End: 2020-12-03

## 2020-12-03 VITALS
OXYGEN SATURATION: 99 % | WEIGHT: 281 LBS | RESPIRATION RATE: 16 BRPM | BODY MASS INDEX: 40.23 KG/M2 | HEIGHT: 70 IN | TEMPERATURE: 97.8 F | HEART RATE: 90 BPM | SYSTOLIC BLOOD PRESSURE: 115 MMHG | DIASTOLIC BLOOD PRESSURE: 71 MMHG

## 2020-12-03 DIAGNOSIS — E11.65 UNCONTROLLED TYPE 2 DIABETES MELLITUS WITH HYPERGLYCEMIA (HCC): ICD-10-CM

## 2020-12-03 DIAGNOSIS — E78.2 MIXED HYPERLIPIDEMIA: ICD-10-CM

## 2020-12-03 DIAGNOSIS — I10 BENIGN ESSENTIAL HYPERTENSION: Primary | ICD-10-CM

## 2020-12-03 DIAGNOSIS — K21.00 GASTROESOPHAGEAL REFLUX DISEASE WITH ESOPHAGITIS WITHOUT HEMORRHAGE: ICD-10-CM

## 2020-12-03 DIAGNOSIS — K59.00 CONSTIPATION, UNSPECIFIED CONSTIPATION TYPE: ICD-10-CM

## 2020-12-03 PROBLEM — E78.5 HYPERLIPIDEMIA: Status: ACTIVE | Noted: 2020-12-03

## 2020-12-03 PROBLEM — R06.02 SHORTNESS OF BREATH: Status: ACTIVE | Noted: 2020-12-03

## 2020-12-03 PROBLEM — R60.9 EDEMA: Status: ACTIVE | Noted: 2020-12-03

## 2020-12-03 PROBLEM — R07.9 CHEST PAIN: Status: ACTIVE | Noted: 2020-12-03

## 2020-12-03 PROCEDURE — 99214 OFFICE O/P EST MOD 30 MIN: CPT | Performed by: NURSE PRACTITIONER

## 2020-12-03 RX ORDER — ATORVASTATIN CALCIUM 80 MG/1
80 TABLET, FILM COATED ORAL NIGHTLY
Qty: 30 TABLET | Refills: 5 | Status: SHIPPED | OUTPATIENT
Start: 2020-12-03 | End: 2021-01-18 | Stop reason: SDUPTHER

## 2020-12-03 RX ORDER — LISINOPRIL 5 MG/1
5 TABLET ORAL DAILY
COMMUNITY
Start: 2020-11-09 | End: 2020-12-03 | Stop reason: SDUPTHER

## 2020-12-03 RX ORDER — INSULIN LISPRO 100 [IU]/ML
50 INJECTION, SOLUTION INTRAVENOUS; SUBCUTANEOUS
Refills: 0
Start: 2020-12-03 | End: 2020-12-09 | Stop reason: SDUPTHER

## 2020-12-03 RX ORDER — OMEPRAZOLE 20 MG/1
20 CAPSULE, DELAYED RELEASE ORAL DAILY
Qty: 30 CAPSULE | Refills: 5 | Status: SHIPPED | OUTPATIENT
Start: 2020-12-03 | End: 2021-01-18 | Stop reason: SDUPTHER

## 2020-12-03 RX ORDER — SEMAGLUTIDE 1.34 MG/ML
0.5 INJECTION, SOLUTION SUBCUTANEOUS WEEKLY
Qty: 15 ML | Refills: 11 | Status: SHIPPED | OUTPATIENT
Start: 2020-12-03 | End: 2020-12-09 | Stop reason: DRUGHIGH

## 2020-12-03 RX ORDER — INSULIN GLARGINE 300 U/ML
125 INJECTION, SOLUTION SUBCUTANEOUS 2 TIMES DAILY
Qty: 1.5 ML | Refills: 11
Start: 2020-12-03 | End: 2020-12-09 | Stop reason: SDUPTHER

## 2020-12-03 RX ORDER — CLOPIDOGREL BISULFATE 75 MG/1
75 TABLET ORAL DAILY
Qty: 90 TABLET | Refills: 3 | Status: SHIPPED | OUTPATIENT
Start: 2020-12-03 | End: 2021-02-23 | Stop reason: SDUPTHER

## 2020-12-03 RX ORDER — DOCUSATE SODIUM 100 MG/1
100 CAPSULE, LIQUID FILLED ORAL 2 TIMES DAILY PRN
Qty: 60 CAPSULE | Refills: 3 | Status: ON HOLD | OUTPATIENT
Start: 2020-12-03 | End: 2022-01-01

## 2020-12-03 RX ORDER — SPIRONOLACTONE 25 MG/1
25 TABLET ORAL DAILY
COMMUNITY
Start: 2020-11-07 | End: 2020-12-21 | Stop reason: SDUPTHER

## 2020-12-03 RX ORDER — LISINOPRIL 5 MG/1
5 TABLET ORAL DAILY
Qty: 90 TABLET | Refills: 3 | Status: SHIPPED | OUTPATIENT
Start: 2020-12-03 | End: 2021-02-23 | Stop reason: SDUPTHER

## 2020-12-03 NOTE — PROGRESS NOTES
Chief Complaint / Reason:      Chief Complaint   Patient presents with   • Diabetes   • Hypertension       Subjective     HPI  Patient presents today for follow-up regarding diabetes and hypertension.  Patient is a uncontrolled diabetic but states he has tried to do better with medication compliance and dietary.  Patient's last hemoglobin A1c was 9.7 checked on 8/4/2020.  Patient normally sees endocrinology but states that he could not get in and is requesting refills for insulin.  He and I had a long discussion regarding compliance to medication to improve glycemic control as blood sugar has not been control for quite some time.  Vital signs are stable.  He denies chest pain shortness of breath or heart palpitations.  Patient states that he has had some constipation issues but denies any blood in stool or urine.  History taken from: patient    PMH/FH/Social History were reviewed and updated appropriately in the electronic medical record.   Past Medical History:   Diagnosis Date   • Anxiety    • Arthritis    • Asthma    • Brachial neuritis 1/19/2017   • Cellulitis     left arm and right leg    • Chest pain    • CHF (congestive heart failure) (CMS/[facility])     Patient reported history May 2020    • COPD (chronic obstructive pulmonary disease) (CMS/[facility])    • Coronary artery disease     Patient reported CABG , 3 vessel   • Depression    • Diabetes mellitus (CMS/[facility])     diagnosed in 1998, checks fsbg 3-4x/day   • Dizziness    • Edema    • Elevated cholesterol    • GERD (gastroesophageal reflux disease)    • H/O chest x-ray 07/04/2015    Mild Left base atelectasis   • H/O echocardiogram 07/05/2015    Normal LVSF. EF of 60-65%. Grade 1 diastolic dysfunction of the LV myocardium. No evidence of pericardial effusion   • H/O exercise stress test    • Hearing loss     No use of hearing aids   • History of fracture     Reported 2 bones in left foot and a finger   • History of herniated intervertebral disc     History of  left L5-S1 disc herniation   • History of pneumonia    • History of pneumonia    • Hyperlipidemia    • Hypertension    • Impaired functional mobility, balance, gait, and endurance    • LOM (loss of memory) 1/19/2017   • Lower back pain     Right   • Measles    • MUSE (nonalcoholic steatohepatitis)    • Neuropathy     feet    • EDD treated with BiPAP     BiPAP HS - instructed to bring mask/machine DOS (setting 13 and 5)   • Palpitations    • Pericardial effusion    • Poor dentition     Patient reported missing multiple teeth   • Renal disorder    • Seizure disorder (CMS/HCC)     focal seizures   • SOB (shortness of breath)    • Staph infection     back after sugery at the incision site    • Stroke (CMS/HCC)     x2 most recent , slight weakness in hands occasionaly    • Wears glasses      Past Surgical History:   Procedure Laterality Date   • BACK SURGERY     • CARDIAC CATHETERIZATION     • CARDIAC CATHETERIZATION N/A 8/11/2017    Procedure: Coronary angiography;  Surgeon: Dallas Kim MD;  Location: [facility] CATH INVASIVE LOCATION;  Service:    • CARDIAC CATHETERIZATION N/A 8/11/2017    Procedure: Left Heart Cath;  Surgeon: Dallas Kim MD;  Location: [facility] CATH INVASIVE LOCATION;  Service:    • CARDIAC CATHETERIZATION N/A 8/11/2017    Procedure: Left ventriculography;  Surgeon: Dallas Kim MD;  Location: [facility] CATH INVASIVE LOCATION;  Service:    • CARDIAC CATHETERIZATION      2002 x1 stent,  x1 stent   • CARDIOVASCULAR STRESS TEST  07/03/2017    WITH DR KIM AT [facility]   • CARPAL TUNNEL RELEASE Right    • CATARACT EXTRACTION W/ INTRAOCULAR LENS IMPLANT Right 6/12/2017    Procedure: CATARACT PHACO EXTRACTION WITH INTRAOCULAR LENS IMPLANT RIGHT ;  Surgeon: Natalie Cao MD;  Location: [facility] OR;  Service:    • CATARACT EXTRACTION W/ INTRAOCULAR LENS IMPLANT Left 7/10/2017    Procedure: CATARACT PHACO EXTRACTION WITH INTRAOCULAR LENS IMPLANT LEFT;  Surgeon: Natalie Cao MD;  Location:   [facility] OR;  Service:    • CHOLECYSTECTOMY     • COLONOSCOPY     • COLONOSCOPY N/A 7/2/2020    Procedure: COLONOSCOPY;  Surgeon: Petra Crowell MD;  Location: [facility] ENDOSCOPY;  Service: Gastroenterology;  Laterality: N/A;  poor prep   • CORONARY ANGIOPLASTY WITH STENT PLACEMENT      X1-LAD   • CORONARY ARTERY BYPASS GRAFT N/A 8/15/2017    Procedure: CORONARY ARTERY BYPASS GRAFTING x 3 UTILIZING THE LEFT INTERNAL MAMMARY ARTERY WITH ENDOSCOPIC VEIN HARVESTING OF THE RIGHT GREATER SAPHENOUS VEIN, HAMLET, LAD ENDARECTOMY;  Surgeon: London Damon MD;  Location:  [facility] OR;  Service:    • ENDOSCOPY     • EYE CAPSULOTOMY WITH LASER Right 11/25/2019    Procedure: EYE CAPSULOTOMY WITH LASER RIGHT;  Surgeon: Natalie Cao MD;  Location:  [facility];  Service: Ophthalmology   • EYE CAPSULOTOMY WITH LASER Left 12/9/2019    Procedure: EYE CAPSULOTOMY WITH LASER LEFT;  Surgeon: Natalie Cao MD;  Location:  [facility];  Service: Ophthalmology   • EYE SURGERY      cataract surgery both eyes   • INTERVENTIONAL RADIOLOGY PROCEDURE Bilateral 12/31/2019    Procedure: Carotid Cerebral Angiogram;  Surgeon: Damian Dubois MD;  Location:  [facility] CATH INVASIVE LOCATION;  Service: Interventional Radiology   • KNEE ARTHROSCOPY Bilateral    • KNEE ARTHROSCOPY Right 2010    Dr Lewis   • KNEE ARTHROSCOPY Left 2008    Dr Jameson   • KNEE MENISCECTOMY Right 10/15/2020    Procedure: Right knee arthroscopy with partial medial and lateral meniscectomy and three compartment chondroplasty.;  Surgeon: South Lewis MD;  Location:  [facility];  Service: Orthopedics;  Laterality: Right;   • MOUTH SURGERY      Oral extractions   • NEUROPLASTY / TRANSPOSITION ULNAR NERVE AT ELBOW Left    • OTHER SURGICAL HISTORY      Foraminotomy and discectomy   • PERICARDIAL WINDOW N/A 8/25/2017    Procedure: PERICARDIAL WINDOW;  Surgeon: Freeman Phillips MD;  Location:  [facility] OR;  Service:      Social History     Socioeconomic History   • Marital status:       Spouse name: Not on file   • Number of children: 1   • Years of education: Not on file   • Highest education level: Not on file   Occupational History   • Occupation: [employer]     Employer: DISABLED     Comment: Disabled since -Back Problems   Tobacco Use   • Smoking status: Former Smoker     Packs/day: 1.00     Years: 10.00     Pack years: 10.00     Types: Cigarettes     Quit date: 1998     Years since quittin.9   • Smokeless tobacco: Former User     Types: Snuff     Quit date:    Substance and Sexual Activity   • Alcohol use: Yes     Frequency: Monthly or less     Drinks per session: 3 or 4     Comment: 3 drinks a year   • Drug use: No   • Sexual activity: Yes     Partners: Female   Social History Narrative    Caffeine use: 0-1 serving daily.    Patient lives at home with wife.      Family History   Problem Relation Age of Onset   • Hypertension Mother    • Arthritis Mother    • Alcohol abuse Father    • Heart disease Other    • Stroke Other    • Hypertension Other    • Other Other         Neurologic disorder   • Parkinsonism Other    • Stroke Other    • Heart disease Other    • Hypertension Other    • Heart disease Other    • Stroke Other    • Hypertension Other        Review of Systems:   Review of Systems   Constitutional: Positive for fatigue.   Respiratory: Negative.    Cardiovascular: Negative.    Gastrointestinal: Positive for constipation.   Genitourinary: Positive for nocturia.   Musculoskeletal: Positive for arthralgias, gait problem and myalgias.   Neurological: Positive for dizziness and numbness.   Psychiatric/Behavioral: Positive for sleep disturbance.         All other systems were reviewed and are negative.  Exceptions are noted in the subjective or above.      Objective     Vital Signs  Vitals:    20 1136   BP: 115/71   Pulse: 90   Resp: 16   Temp: 97.8 °F (36.6 °C)   SpO2: 99%       Body mass index is 40.32 kg/m².    Physical Exam  Vitals signs and  nursing note reviewed.   Constitutional:       General: He is not in acute distress.     Appearance: He is well-developed. He is not diaphoretic.   HENT:      Head: Normocephalic and atraumatic.      Mouth/Throat:      Mouth: Mucous membranes are moist.      Pharynx: Oropharynx is clear.      Comments: Crowded oropharynx.   Neck:      Musculoskeletal: Neck supple.   Cardiovascular:      Rate and Rhythm: Normal rate and regular rhythm.      Pulses: Normal pulses.      Heart sounds: Normal heart sounds. No murmur.   Pulmonary:      Effort: Pulmonary effort is normal. No respiratory distress.      Breath sounds: Wheezing present.   Chest:      Chest wall: No tenderness.   Abdominal:      General: Bowel sounds are normal.      Palpations: Abdomen is soft.      Tenderness: There is abdominal tenderness.   Musculoskeletal:         General: Tenderness and deformity present.   Skin:     General: Skin is warm and dry.      Capillary Refill: Capillary refill takes less than 2 seconds.   Neurological:      Mental Status: He is alert and oriented to person, place, and time.      Sensory: Sensory deficit present.   Psychiatric:         Behavior: Behavior normal.         Thought Content: Thought content normal.         Judgment: Judgment normal.              Results Review:    I reviewed the patient's new clinical results.   Lab Results   Component Value Date    HGBA1C 12.5 12/09/2020    HGBA1C 9.70 (H) 08/04/2020    HGBA1C 11.80 (H) 11/30/2019         Medication Review:     Current Outpatient Medications:   •  albuterol sulfate HFA (Ventolin HFA) 108 (90 Base) MCG/ACT inhaler, Inhale 2 puffs Every 4 (Four) Hours As Needed for Wheezing or Shortness of Air., Disp: 1 inhaler, Rfl: 4  •  amitriptyline (ELAVIL) 25 MG tablet, TAKE 3 TABLETS BY MOUTH EVERY NIGHT AT BEDTIME, Disp: 270 tablet, Rfl: 0  •  Asmanex  MCG/ACT aerosol, Inhale 1 puff 2 (Two) Times a Day., Disp: 1 inhaler, Rfl: 5  •  aspirin 81 MG EC tablet, Take 81 mg by  mouth Daily., Disp: , Rfl:   •  atorvastatin (LIPITOR) 80 MG tablet, Take 1 tablet by mouth Every Night., Disp: 30 tablet, Rfl: 5  •  budesonide-formoterol (Symbicort) 80-4.5 MCG/ACT inhaler, Inhale 2 puffs 2 (Two) Times a Day. Rinse mouth with water after use., Disp: 1 inhaler, Rfl: 5  •  cloNIDine (Catapres) 0.1 MG tablet, Take 1 tablet by mouth every 6 hours as needed for SBP > 170 or DBP > 100, Disp: 120 tablet, Rfl: 0  •  clopidogrel (PLAVIX) 75 MG tablet, Take 1 tablet by mouth Daily., Disp: 90 tablet, Rfl: 3  •  coenzyme Q10 100 MG capsule, Take 100 mg by mouth Daily., Disp: , Rfl:   •  CYCLOSET 0.8 MG tablet, Take 3.2 mg by mouth Every Morning., Disp: , Rfl: 0  •  Dupixent 300 MG/2ML solution prefilled syringe, INJECT 300 MG SUBCUTANEOUSLY EVERY OTHER WEEK, Disp: 4 syringe, Rfl: 4  •  gabapentin (NEURONTIN) 600 MG tablet, Take 600 mg by mouth 3 (Three) Times a Day., Disp: , Rfl: 0  •  guaifenesin (ROBITUSSIN) 100 MG/5ML liquid, Take 10 mL by mouth Every 4 (Four) Hours As Needed for Cough., Disp: 118 mL, Rfl: 0  •  HumaLOG KwikPen 100 UNIT/ML solution pen-injector, Inject 50 Units under the skin into the appropriate area as directed 3 (Three) Times a Day With Meals. Follows SSI From PCP, Disp: , Rfl: 0  •  HYDROcodone-acetaminophen (NORCO) 7.5-325 MG per tablet, Take 1 tablet by mouth Every 8 (Eight) Hours As Needed for pain., Disp: 21 tablet, Rfl: 0  •  ipratropium-albuterol (DUO-NEB) 0.5-2.5 mg/3 ml nebulizer, Take 3 mL by nebulization 4 (Four) Times a Day As Needed for Wheezing or Shortness of Air., Disp: 360 mL, Rfl: 5  •  levocetirizine (XYZAL) 5 MG tablet, Take 1 tablet by mouth Every Evening., Disp: 90 tablet, Rfl: 3  •  meclizine (ANTIVERT) 25 MG tablet, Take 1 tablet by mouth 3 (Three) Times a Day As Needed for dizziness., Disp: 10 tablet, Rfl: 0  •  metoprolol tartrate (LOPRESSOR) 25 MG tablet, Take 1/2 tablet by mouth 2 (Two) times a day for 7 days, then 1/2 tablet once a day for 7 days,  then stop  (Patient taking differently: Take 25 mg by mouth 2 (Two) Times a Day.), Disp: 11 tablet, Rfl: 0  •  omeprazole (priLOSEC) 20 MG capsule, Take 1 capsule by mouth Daily., Disp: 30 capsule, Rfl: 5  •  Ozempic, 0.25 or 0.5 MG/DOSE, 2 MG/1.5ML solution pen-injector, Inject 0.5 mg under the skin into the appropriate area as directed 1 (One) Time Per Week., Disp: 15 mL, Rfl: 11  •  predniSONE (DELTASONE) 20 MG tablet, Take 3 tablets by mouth Daily., Disp: 15 tablet, Rfl: 0  •  promethazine (PHENERGAN) 25 MG tablet, Take 1 tablet by mouth Every 6 (Six) Hours As Needed for Nausea or Vomiting., Disp: 20 tablet, Rfl: 0  •  spironolactone (ALDACTONE) 25 MG tablet, Take 25 mg by mouth Daily., Disp: , Rfl:   •  Tiotropium Bromide Monohydrate (Spiriva Respimat) 1.25 MCG/ACT aerosol solution inhaler, Inhale 2 puffs Daily., Disp: 1 inhaler, Rfl: 5  •  tiZANidine (ZANAFLEX) 4 MG tablet, Take 4 mg by mouth Every 6 (Six) Hours As Needed for Muscle Spasms., Disp: , Rfl:   •  Toujeo SoloStar 300 UNIT/ML solution pen-injector injection, Inject 125 Units under the skin into the appropriate area as directed 2 (two) times a day. One month supply, Disp: 1.5 mL, Rfl: 11  •  TURMERIC PO, Take 500 mg by mouth 2 (Two) Times a Day., Disp: , Rfl:   •  vitamin C (ASCORBIC ACID) 500 MG tablet, Take 500 mg by mouth Daily., Disp: , Rfl:   •  Vortioxetine HBr (Trintellix) 20 MG tablet, Take 20 mg by mouth Daily., Disp: 30 tablet, Rfl: 5  •  zafirlukast (Accolate) 20 MG tablet, Take 1 tablet by mouth 2 (Two) Times a Day., Disp: 60 tablet, Rfl: 5  •  docusate sodium (Colace) 100 MG capsule, Take 1 capsule by mouth 2 (Two) Times a Day As Needed for Constipation., Disp: 60 capsule, Rfl: 3  •  lisinopril (PRINIVIL,ZESTRIL) 5 MG tablet, Take 1 tablet by mouth Daily., Disp: 90 tablet, Rfl: 3    Diagnoses and all orders for this visit:    Benign essential hypertension  -     spironolactone (ALDACTONE) 25 MG tablet; Take 25 mg by mouth Daily.  -     clopidogrel  "(PLAVIX) 75 MG tablet; Take 1 tablet by mouth Daily.  -     lisinopril (PRINIVIL,ZESTRIL) 5 MG tablet; Take 1 tablet by mouth Daily.  -     Discontinue: Ozempic, 0.25 or 0.5 MG/DOSE, 2 MG/1.5ML solution pen-injector; Inject 0.5 mg under the skin into the appropriate area as directed 1 (One) Time Per Week.    Mixed hyperlipidemia  -     atorvastatin (LIPITOR) 80 MG tablet; Take 1 tablet by mouth Every Night.  Recommend dietary modifications and exercise to decrease LDL which is \"bad\" cholesterol and improve HDL which is \"good\" cholesterol.      Uncontrolled type 2 diabetes mellitus with hyperglycemia (CMS/[facility])  -    : HumaLOG KwikPen 100 UNIT/ML solution pen-injector; Inject 50 Units under the skin into the appropriate area as directed 3 (Three) Times a Day With Meals. Follows SSI From PCP  -     Nawaf Handley 300 UNIT/ML solution pen-injector injection; Inject 125 Units under the skin into the appropriate area as directed 2 (two) times a day. One month supply  Follow diabetic diet  Monitor blood sugars as discussed  See eye doctor annually or as discussed  Wear protective foot wear/no bare feet  Check feet regularly for calluses or ulcers  Discussed risk of poorly controlled diabetes and long-term complications  Exercise as tolerated up to 30 minutes 5 days a week  Take all medications as prescribed    Gastroesophageal reflux disease with esophagitis without hemorrhage  -     omeprazole (priLOSEC) 20 MG capsule; Take 1 capsule by mouth Daily.  Avoid eating spicy foods  Avoid caffeinated beverages  Avoid carbonated beverages  Do not eat or drink within 2-3 hours of going to bed in the evening  Elevate head of bed on 4-6 inch blocks  Eat smaller portions of food throughout the day    Constipation, unspecified constipation type  -     docusate sodium (Colace) 100 MG capsule; Take 1 capsule by mouth 2 (Two) Times a Day As Needed for Constipation.  Discussed dietary modifications along with maintaining hydration and " glycemic control  Other orders  -     Discontinue: lisinopril (PRINIVIL,ZESTRIL) 5 MG tablet; Take 5 mg by mouth Daily.          Return in about 3 months (around 3/3/2021), or if symptoms worsen or fail to improve.    Leeanna Willett, APRN  12/03/2020

## 2020-12-04 RX ORDER — TIZANIDINE 4 MG/1
TABLET ORAL
Qty: 30 TABLET | Refills: 1 | Status: SHIPPED | OUTPATIENT
Start: 2020-12-04 | End: 2021-06-14

## 2020-12-09 ENCOUNTER — OFFICE VISIT (OUTPATIENT)
Dept: ENDOCRINOLOGY | Facility: CLINIC | Age: 60
End: 2020-12-09

## 2020-12-09 VITALS
SYSTOLIC BLOOD PRESSURE: 136 MMHG | HEIGHT: 70 IN | WEIGHT: 285.5 LBS | BODY MASS INDEX: 40.87 KG/M2 | TEMPERATURE: 97.7 F | DIASTOLIC BLOOD PRESSURE: 74 MMHG | HEART RATE: 104 BPM | OXYGEN SATURATION: 97 % | RESPIRATION RATE: 18 BRPM

## 2020-12-09 DIAGNOSIS — I10 ESSENTIAL HYPERTENSION: ICD-10-CM

## 2020-12-09 DIAGNOSIS — I25.10 CORONARY ARTERIOSCLEROSIS IN NATIVE ARTERY: ICD-10-CM

## 2020-12-09 DIAGNOSIS — E11.319 TYPE 2 DIABETES MELLITUS WITH RETINOPATHY, WITH LONG-TERM CURRENT USE OF INSULIN, MACULAR EDEMA PRESENCE UNSPECIFIED, UNSPECIFIED LATERALITY, UNSPECIFIED RETINOPATHY SEVERITY (HCC): ICD-10-CM

## 2020-12-09 DIAGNOSIS — E11.42 DIABETIC POLYNEUROPATHY ASSOCIATED WITH TYPE 2 DIABETES MELLITUS (HCC): ICD-10-CM

## 2020-12-09 DIAGNOSIS — E11.65 UNCONTROLLED TYPE 2 DIABETES MELLITUS WITH HYPERGLYCEMIA (HCC): Primary | ICD-10-CM

## 2020-12-09 DIAGNOSIS — Z79.4 TYPE 2 DIABETES MELLITUS WITH RETINOPATHY, WITH LONG-TERM CURRENT USE OF INSULIN, MACULAR EDEMA PRESENCE UNSPECIFIED, UNSPECIFIED LATERALITY, UNSPECIFIED RETINOPATHY SEVERITY (HCC): ICD-10-CM

## 2020-12-09 DIAGNOSIS — E78.2 MIXED HYPERLIPIDEMIA: ICD-10-CM

## 2020-12-09 LAB
EXPIRATION DATE: NORMAL
HBA1C MFR BLD: 12.5 %
Lab: NORMAL

## 2020-12-09 PROCEDURE — 99214 OFFICE O/P EST MOD 30 MIN: CPT | Performed by: INTERNAL MEDICINE

## 2020-12-09 PROCEDURE — 83036 HEMOGLOBIN GLYCOSYLATED A1C: CPT | Performed by: INTERNAL MEDICINE

## 2020-12-09 RX ORDER — INSULIN LISPRO 100 [IU]/ML
50 INJECTION, SOLUTION INTRAVENOUS; SUBCUTANEOUS
Qty: 135 ML | Refills: 3
Start: 2020-12-09 | End: 2020-12-10 | Stop reason: SDUPTHER

## 2020-12-09 RX ORDER — INSULIN GLARGINE 300 U/ML
125 INJECTION, SOLUTION SUBCUTANEOUS 2 TIMES DAILY
Qty: 235 ML | Refills: 3 | Status: SHIPPED | OUTPATIENT
Start: 2020-12-09 | End: 2021-06-14

## 2020-12-09 RX ORDER — SEMAGLUTIDE 1.34 MG/ML
1 INJECTION, SOLUTION SUBCUTANEOUS WEEKLY
Qty: 9 ML | Refills: 3 | Status: SHIPPED | OUTPATIENT
Start: 2020-12-09 | End: 2022-01-01

## 2020-12-09 RX ORDER — BROMOCRIPTINE MESYLATE 0.8 MG/1
3.2 TABLET ORAL EVERY MORNING
Qty: 120 TABLET | Refills: 5 | Status: SHIPPED | OUTPATIENT
Start: 2020-12-09 | End: 2021-08-16 | Stop reason: HOSPADM

## 2020-12-09 RX ORDER — GABAPENTIN 600 MG/1
600 TABLET ORAL 3 TIMES DAILY
Qty: 270 TABLET | Refills: 3 | Status: SHIPPED | OUTPATIENT
Start: 2020-12-09 | End: 2021-02-23 | Stop reason: SDUPTHER

## 2020-12-09 NOTE — PROGRESS NOTES
"     Office Note      Date: 2020  Patient Name: Denzel Harding  MRN: [id number]  : 1960    Chief Complaint   Patient presents with   • Diabetes     Follow up, med refills        History of Present Illness:   Denzel Harding is a 60 y.o. male who presents for Diabetes type 2. Diagnosed in: . Treated in past with oral agents. Current treatments: ozempic, cycloset and basal bolus insuline. Number of insulin shots per day: 4. Checks blood sugar 3 times a day. Has low blood sugar: no. Aspirin use: Yes. Statin use: Yes. ACE-I/ARB use: Yes. Changes in health since last visit: teeth extractions. Last eye exam .    He is on gabapentin which helps with the neuropathy.    Subjective      Diabetic Complications:  Eyes: Yes - retinopathy  Kidneys: No  Feet: Yes - painful foot neuropathy  Heart: Yes - stent and CABG    Diet and Exercise:  Meals per day: 3  Minutes of exercise per week: 0 mins.    Review of Systems:   Review of Systems   Constitutional: Negative.    Cardiovascular: Negative.    Gastrointestinal: Negative.    Endocrine: Negative.        The following portions of the patient's history were reviewed and updated as appropriate: allergies, current medications, past family history, past medical history, past social history, past surgical history and problem list.    Objective       Visit Vitals  /74   Pulse 104   Temp 97.7 °F (36.5 °C) (Temporal)   Resp 18   Ht 177.8 cm (70\")   Wt 130 kg (285 lb 8 oz)   SpO2 97%   BMI 40.96 kg/m²       Physical Exam:  Physical Exam  Constitutional:       Appearance: Normal appearance.   Neurological:      Mental Status: He is alert.         Labs:    HbA1c  Lab Results   Component Value Date    HGBA1C 12.5 2020       CMP  Lab Results   Component Value Date    GLUCOSE 305 (H) 11/15/2020    BUN 9 11/15/2020    CREATININE 0.74 (L) 11/15/2020    EGFRIFNONA 108 11/15/2020    BCR 12.2 11/15/2020    K 3.8 11/15/2020    CO2 26.4 11/15/2020    CALCIUM 9.5 " 11/15/2020    LABIL2 1.5 08/14/2015    AST 54 (H) 11/15/2020    ALT 76 (H) 11/15/2020        Lipid Panel  Lab Results   Component Value Date    HDL 62 (H) 06/01/2019    LDL 60 06/01/2019    TRIG 171 (H) 06/17/2020        TSH  Lab Results   Component Value Date    TSH 3.120 08/03/2020    FREET4 0.99 04/11/2017        Hemoglobin A1C  Lab Results   Component Value Date    HGBA1C 12.5 12/09/2020        Microalbumin/Creatinine  No results found for: MALBCRERATIO, CREATINIURIN, MICROALBUR        Assessment / Plan      Assessment & Plan:  Problem List Items Addressed This Visit        Cardiovascular and Mediastinum    Coronary arteriosclerosis in native artery    Overview     1. [facility] 8-11-17:  · Severe three-vessel coronary artery disease  · Preserved global and regional left ventricular systolic function  · Elevated left ventricular filling pressures consistent with diastolic heart failure.  2. CABG x 3 (8-15-17):         Current Assessment & Plan     Continue cardiology follow up.         Relevant Medications    metoprolol tartrate (LOPRESSOR) 25 MG tablet    clopidogrel (PLAVIX) 75 MG tablet    Essential hypertension    Current Assessment & Plan     Hypertension is unchanged.  Continue current treatment regimen.  Blood pressure will be reassessed in 3 months.         Relevant Medications    metoprolol tartrate (LOPRESSOR) 25 MG tablet    cloNIDine (Catapres) 0.1 MG tablet    spironolactone (ALDACTONE) 25 MG tablet    lisinopril (PRINIVIL,ZESTRIL) 5 MG tablet    Hyperlipidemia    Relevant Medications    atorvastatin (LIPITOR) 80 MG tablet       Endocrine    Diabetic polyneuropathy associated with type 2 diabetes mellitus (CMS/[facility])    Current Assessment & Plan     Refill gabapentin.         Relevant Medications    Semaglutide, 1 MG/DOSE, (Ozempic, 1 MG/DOSE,) 2 MG/1.5ML solution pen-injector    Cycloset 0.8 MG tablet    HumaLOG KwikPen 100 UNIT/ML solution pen-injector    Toujeo SoloStar 300 UNIT/ML solution pen-injector  injection    gabapentin (NEURONTIN) 600 MG tablet    Type 2 diabetes mellitus (CMS/[facility]) - Primary    Current Assessment & Plan     Diabetes is worsening.   Continue current treatment regimen.  Diabetes will be reassessed in 3 months.    A1c well above goal.  He has been on steroids he reports.  Increase ozempic.  Increase insulin.         Relevant Medications    Semaglutide, 1 MG/DOSE, (Ozempic, 1 MG/DOSE,) 2 MG/1.5ML solution pen-injector    Cycloset 0.8 MG tablet    HumaLOG KwikPen 100 UNIT/ML solution pen-injector    Toujeo SoloStar 300 UNIT/ML solution pen-injector injection    Type 2 diabetes mellitus with retinopathy, with long-term current use of insulin (CMS/[facility])    Current Assessment & Plan     Continue ophthalmology follow up.         Relevant Medications    Semaglutide, 1 MG/DOSE, (Ozempic, 1 MG/DOSE,) 2 MG/1.5ML solution pen-injector    Cycloset 0.8 MG tablet    HumaLOG KwikPen 100 UNIT/ML solution pen-injector    Toujeo SoloStar 300 UNIT/ML solution pen-injector injection           Return in about 3 months (around 3/9/2021) for Recheck with A1c, CMP.    Blaine Waters MD   12/09/2020

## 2020-12-09 NOTE — ASSESSMENT & PLAN NOTE
Diabetes is worsening.   Continue current treatment regimen.  Diabetes will be reassessed in 3 months.    A1c well above goal.  He has been on steroids he reports.  Increase ozempic.  Increase insulin.

## 2020-12-10 DIAGNOSIS — E11.65 UNCONTROLLED TYPE 2 DIABETES MELLITUS WITH HYPERGLYCEMIA (HCC): ICD-10-CM

## 2020-12-10 RX ORDER — INSULIN LISPRO 100 [IU]/ML
INJECTION, SOLUTION INTRAVENOUS; SUBCUTANEOUS
Qty: 135 ML | Refills: 1
Start: 2020-12-10 | End: 2020-12-11 | Stop reason: SDUPTHER

## 2020-12-10 NOTE — TELEPHONE ENCOUNTER
Pharmacy called for the patient. Says they received the prescriptions ordered yesterday, but that the humalog wasn't sent and that the patient is completely out. Please resend the humalog order when you get a chance. Thank you.

## 2020-12-11 DIAGNOSIS — E11.65 UNCONTROLLED TYPE 2 DIABETES MELLITUS WITH HYPERGLYCEMIA (HCC): ICD-10-CM

## 2020-12-11 RX ORDER — INSULIN LISPRO 100 [IU]/ML
INJECTION, SOLUTION INTRAVENOUS; SUBCUTANEOUS
Qty: 135 ML | Refills: 1
Start: 2020-12-11 | End: 2020-12-14 | Stop reason: SDUPTHER

## 2020-12-11 NOTE — TELEPHONE ENCOUNTER
Refill humalog insulin quick pen; hes waiting in the [facility] pharmacy please send

## 2020-12-14 ENCOUNTER — TELEPHONE (OUTPATIENT)
Dept: ENDOCRINOLOGY | Facility: CLINIC | Age: 60
End: 2020-12-14

## 2020-12-14 DIAGNOSIS — E11.65 UNCONTROLLED TYPE 2 DIABETES MELLITUS WITH HYPERGLYCEMIA (HCC): ICD-10-CM

## 2020-12-14 RX ORDER — INSULIN LISPRO 100 [IU]/ML
INJECTION, SOLUTION INTRAVENOUS; SUBCUTANEOUS
Qty: 135 ML | Refills: 1
Start: 2020-12-14 | End: 2021-02-23 | Stop reason: SDUPTHER

## 2020-12-14 NOTE — TELEPHONE ENCOUNTER
Approvedtoday  PA Case: [id number], Status: Approved, Coverage Starts on: 9/14/2020 12:00:00 AM, Coverage Ends on: 12/14/2021 12:00:00 AM.  Drug  Cycloset 0.8MG tablets

## 2020-12-21 DIAGNOSIS — I10 BENIGN ESSENTIAL HYPERTENSION: ICD-10-CM

## 2020-12-21 RX ORDER — SPIRONOLACTONE 25 MG/1
25 TABLET ORAL DAILY
Qty: 30 TABLET | Refills: 0 | Status: ON HOLD | OUTPATIENT
Start: 2020-12-21 | End: 2021-01-27

## 2020-12-21 NOTE — TELEPHONE ENCOUNTER
Needs fasting appt in  January for further refills and change his march appt to medicare if it is due

## 2021-01-06 ENCOUNTER — OFFICE VISIT (OUTPATIENT)
Dept: ORTHOPEDIC SURGERY | Facility: CLINIC | Age: 61
End: 2021-01-06

## 2021-01-06 VITALS — HEIGHT: 70 IN | BODY MASS INDEX: 40.77 KG/M2 | RESPIRATION RATE: 18 BRPM | WEIGHT: 284.8 LBS

## 2021-01-06 DIAGNOSIS — Z98.890 S/P RIGHT KNEE ARTHROSCOPY: Primary | ICD-10-CM

## 2021-01-06 DIAGNOSIS — M17.11 PRIMARY LOCALIZED OSTEOARTHRITIS OF RIGHT KNEE: ICD-10-CM

## 2021-01-06 PROCEDURE — 99024 POSTOP FOLLOW-UP VISIT: CPT | Performed by: PHYSICIAN ASSISTANT

## 2021-01-06 NOTE — PROGRESS NOTES
Subjective   Patient ID: Denzel Harding is a 60 y.o. male  Follow-up of the Right Knee (Follow up s/p right knee arthroscopy on 10/15/20. Patient states he is doing well, is doing HEP. )         History of Present Illness    Patient is following up for his scheduled appointment regarding right knee arthroscopy for 10 15,020.  Patient states he is doing great.  He denies any pain or instability.      Past Medical History:   Diagnosis Date   • Anxiety    • Arthritis    • Asthma    • Brachial neuritis 1/19/2017   • Cellulitis     left arm and right leg    • Chest pain    • CHF (congestive heart failure) (CMS/[facility])     Patient reported history May 2020    • COPD (chronic obstructive pulmonary disease) (CMS/[facility])    • Coronary artery disease     Patient reported CABG , 3 vessel   • Depression    • Diabetes mellitus (CMS/[facility])     diagnosed in 1998, checks fsbg 3-4x/day   • Dizziness    • Edema    • Elevated cholesterol    • GERD (gastroesophageal reflux disease)    • H/O chest x-ray 07/04/2015    Mild Left base atelectasis   • H/O echocardiogram 07/05/2015    Normal LVSF. EF of 60-65%. Grade 1 diastolic dysfunction of the LV myocardium. No evidence of pericardial effusion   • H/O exercise stress test    • Hearing loss     No use of hearing aids   • History of fracture     Reported 2 bones in left foot and a finger   • History of herniated intervertebral disc     History of left L5-S1 disc herniation   • History of pneumonia    • History of pneumonia    • Hyperlipidemia    • Hypertension    • Impaired functional mobility, balance, gait, and endurance    • LOM (loss of memory) 1/19/2017   • Lower back pain     Right   • Measles    • MUSE (nonalcoholic steatohepatitis)    • Neuropathy     feet    • EDD treated with BiPAP     BiPAP HS - instructed to bring mask/machine DOS (setting 13 and 5)   • Palpitations    • Pericardial effusion    • Poor dentition     Patient reported missing multiple teeth   • Renal disorder    •  Seizure disorder (CMS/HCC)     focal seizures   • SOB (shortness of breath)    • Staph infection     back after sugery at the incision site    • Stroke (CMS/HCC)     x2 most recent , slight weakness in hands occasionaly    • Wears glasses         Past Surgical History:   Procedure Laterality Date   • BACK SURGERY     • CARDIAC CATHETERIZATION     • CARDIAC CATHETERIZATION N/A 8/11/2017    Procedure: Coronary angiography;  Surgeon: Dallas Hernandez MD;  Location: [facility] CATH INVASIVE LOCATION;  Service:    • CARDIAC CATHETERIZATION N/A 8/11/2017    Procedure: Left Heart Cath;  Surgeon: Dallas Hernandez MD;  Location: [facility] CATH INVASIVE LOCATION;  Service:    • CARDIAC CATHETERIZATION N/A 8/11/2017    Procedure: Left ventriculography;  Surgeon: Dallas Hernandez MD;  Location: [facility] CATH INVASIVE LOCATION;  Service:    • CARDIAC CATHETERIZATION      2002 x1 stent,  x1 stent   • CARDIOVASCULAR STRESS TEST  07/03/2017    WITH DR HERNANDEZ AT [facility]   • CARPAL TUNNEL RELEASE Right    • CATARACT EXTRACTION W/ INTRAOCULAR LENS IMPLANT Right 6/12/2017    Procedure: CATARACT PHACO EXTRACTION WITH INTRAOCULAR LENS IMPLANT RIGHT ;  Surgeon: Natalie Cao MD;  Location: [facility] OR;  Service:    • CATARACT EXTRACTION W/ INTRAOCULAR LENS IMPLANT Left 7/10/2017    Procedure: CATARACT PHACO EXTRACTION WITH INTRAOCULAR LENS IMPLANT LEFT;  Surgeon: Natalie Cao MD;  Location: [facility] OR;  Service:    • CHOLECYSTECTOMY     • COLONOSCOPY     • COLONOSCOPY N/A 7/2/2020    Procedure: COLONOSCOPY;  Surgeon: Petra Crowell MD;  Location: [facility] ENDOSCOPY;  Service: Gastroenterology;  Laterality: N/A;  poor prep   • CORONARY ANGIOPLASTY WITH STENT PLACEMENT      X1-LAD   • CORONARY ARTERY BYPASS GRAFT N/A 8/15/2017    Procedure: CORONARY ARTERY BYPASS GRAFTING x 3 UTILIZING THE LEFT INTERNAL MAMMARY ARTERY WITH ENDOSCOPIC VEIN HARVESTING OF THE RIGHT GREATER SAPHENOUS VEIN, HAMLET, LAD ENDARECTOMY;  Surgeon: London EDWARDS  MD Marisol;  Location:  [facility] OR;  Service:    • ENDOSCOPY     • EYE CAPSULOTOMY WITH LASER Right 11/25/2019    Procedure: EYE CAPSULOTOMY WITH LASER RIGHT;  Surgeon: Natalie Cao MD;  Location:  [facility] OR;  Service: Ophthalmology   • EYE CAPSULOTOMY WITH LASER Left 12/9/2019    Procedure: EYE CAPSULOTOMY WITH LASER LEFT;  Surgeon: Natalie Cao MD;  Location:  [facility] OR;  Service: Ophthalmology   • EYE SURGERY      cataract surgery both eyes   • INTERVENTIONAL RADIOLOGY PROCEDURE Bilateral 12/31/2019    Procedure: Carotid Cerebral Angiogram;  Surgeon: Damian Dubois MD;  Location:  [facility] CATH INVASIVE LOCATION;  Service: Interventional Radiology   • KNEE ARTHROSCOPY Bilateral    • KNEE ARTHROSCOPY Right 2010    Dr Lewis   • KNEE ARTHROSCOPY Left 2008    Dr Jameson   • KNEE MENISCECTOMY Right 10/15/2020    Procedure: Right knee arthroscopy with partial medial and lateral meniscectomy and three compartment chondroplasty.;  Surgeon: South Lewis MD;  Location:  [facility] OR;  Service: Orthopedics;  Laterality: Right;   • MOUTH SURGERY      Oral extractions   • NEUROPLASTY / TRANSPOSITION ULNAR NERVE AT ELBOW Left    • OTHER SURGICAL HISTORY      Foraminotomy and discectomy   • PERICARDIAL WINDOW N/A 8/25/2017    Procedure: PERICARDIAL WINDOW;  Surgeon: Freeman Phillips MD;  Location:  [facility] OR;  Service:        Family History   Problem Relation Age of Onset   • Hypertension Mother    • Arthritis Mother    • Alcohol abuse Father    • Heart disease Other    • Stroke Other    • Hypertension Other    • Other Other         Neurologic disorder   • Parkinsonism Other    • Stroke Other    • Heart disease Other    • Hypertension Other    • Heart disease Other    • Stroke Other    • Hypertension Other        Social History     Socioeconomic History   • Marital status:      Spouse name: Not on file   • Number of children: 1   • Years of education: Not on file   • Highest education level: Not on file   Occupational  History   • Occupation: [employer]     Employer: DISABLED     Comment: Disabled since -Back Problems   Tobacco Use   • Smoking status: Former Smoker     Packs/day: 1.00     Years: 10.00     Pack years: 10.00     Types: Cigarettes     Quit date: 1998     Years since quittin.0   • Smokeless tobacco: Former User     Types: Snuff     Quit date:    Substance and Sexual Activity   • Alcohol use: Yes     Frequency: Monthly or less     Drinks per session: 3 or 4     Comment: 3 drinks a year   • Drug use: No   • Sexual activity: Yes     Partners: Female   Social History Narrative    Caffeine use: 0-1 serving daily.    Patient lives at home with wife.          Current Outpatient Medications:   •  albuterol sulfate HFA (Ventolin HFA) 108 (90 Base) MCG/ACT inhaler, Inhale 2 puffs Every 4 (Four) Hours As Needed for Wheezing or Shortness of Air., Disp: 1 inhaler, Rfl: 4  •  amitriptyline (ELAVIL) 25 MG tablet, TAKE 3 TABLETS BY MOUTH EVERY NIGHT AT BEDTIME, Disp: 270 tablet, Rfl: 0  •  Asmanex  MCG/ACT aerosol, Inhale 1 puff 2 (Two) Times a Day., Disp: 1 inhaler, Rfl: 5  •  aspirin 81 MG EC tablet, Take 81 mg by mouth Daily., Disp: , Rfl:   •  atorvastatin (LIPITOR) 80 MG tablet, Take 1 tablet by mouth Every Night., Disp: 30 tablet, Rfl: 5  •  budesonide-formoterol (Symbicort) 80-4.5 MCG/ACT inhaler, Inhale 2 puffs 2 (Two) Times a Day. Rinse mouth with water after use., Disp: 1 inhaler, Rfl: 5  •  cloNIDine (Catapres) 0.1 MG tablet, Take 1 tablet by mouth every 6 hours as needed for SBP > 170 or DBP > 100, Disp: 120 tablet, Rfl: 0  •  clopidogrel (PLAVIX) 75 MG tablet, Take 1 tablet by mouth Daily., Disp: 90 tablet, Rfl: 3  •  coenzyme Q10 100 MG capsule, Take 100 mg by mouth Daily., Disp: , Rfl:   •  Cycloset 0.8 MG tablet, Take 3.2 mg by mouth Every Morning., Disp: 120 tablet, Rfl: 5  •  docusate sodium (Colace) 100 MG capsule, Take 1 capsule by mouth 2 (Two) Times a Day As Needed for  Constipation., Disp: 60 capsule, Rfl: 3  •  Dupixent 300 MG/2ML solution prefilled syringe, INJECT 300 MG SUBCUTANEOUSLY EVERY OTHER WEEK, Disp: 4 syringe, Rfl: 4  •  gabapentin (NEURONTIN) 600 MG tablet, Take 1 tablet by mouth 3 (Three) Times a Day., Disp: 270 tablet, Rfl: 3  •  guaifenesin (ROBITUSSIN) 100 MG/5ML liquid, Take 10 mL by mouth Every 4 (Four) Hours As Needed for Cough., Disp: 118 mL, Rfl: 0  •  HumaLOG KwikPen 100 UNIT/ML solution pen-injector, Inject 50 units tid before meals, Disp: 135 mL, Rfl: 1  •  ipratropium-albuterol (DUO-NEB) 0.5-2.5 mg/3 ml nebulizer, Take 3 mL by nebulization 4 (Four) Times a Day As Needed for Wheezing or Shortness of Air., Disp: 360 mL, Rfl: 5  •  levocetirizine (XYZAL) 5 MG tablet, Take 1 tablet by mouth Every Evening., Disp: 90 tablet, Rfl: 3  •  lisinopril (PRINIVIL,ZESTRIL) 5 MG tablet, Take 1 tablet by mouth Daily., Disp: 90 tablet, Rfl: 3  •  meclizine (ANTIVERT) 25 MG tablet, Take 1 tablet by mouth 3 (Three) Times a Day As Needed for dizziness., Disp: 10 tablet, Rfl: 0  •  metoprolol tartrate (LOPRESSOR) 25 MG tablet, Take 1/2 tablet by mouth 2 (Two) times a day for 7 days, then 1/2 tablet once a day for 7 days,  then stop (Patient taking differently: Take 25 mg by mouth 2 (Two) Times a Day.), Disp: 11 tablet, Rfl: 0  •  omeprazole (priLOSEC) 20 MG capsule, Take 1 capsule by mouth Daily., Disp: 30 capsule, Rfl: 5  •  predniSONE (DELTASONE) 20 MG tablet, Take 3 tablets by mouth Daily., Disp: 15 tablet, Rfl: 0  •  promethazine (PHENERGAN) 25 MG tablet, Take 1 tablet by mouth Every 6 (Six) Hours As Needed for Nausea or Vomiting., Disp: 20 tablet, Rfl: 0  •  Semaglutide, 1 MG/DOSE, (Ozempic, 1 MG/DOSE,) 2 MG/1.5ML solution pen-injector, Inject 1 mg under the skin into the appropriate area as directed 1 (One) Time Per Week., Disp: 9 mL, Rfl: 3  •  spironolactone (ALDACTONE) 25 MG tablet, Take 1 tablet by mouth Daily., Disp: 30 tablet, Rfl: 0  •  Tiotropium Bromide  "Monohydrate (Spiriva Respimat) 1.25 MCG/ACT aerosol solution inhaler, Inhale 2 puffs Daily., Disp: 1 inhaler, Rfl: 5  •  tiZANidine (ZANAFLEX) 4 MG tablet, TAKE 1 CAPSULE BY MOUTH AT NIGHT AS NEEDED FOR MUSCLE SPASMS, Disp: 30 tablet, Rfl: 1  •  Toujeo SoloStar 300 UNIT/ML solution pen-injector injection, Inject 125 Units under the skin into the appropriate area as directed 2 (two) times a day. One month supply, Disp: 235 mL, Rfl: 3  •  TURMERIC PO, Take 500 mg by mouth 2 (Two) Times a Day., Disp: , Rfl:   •  vitamin C (ASCORBIC ACID) 500 MG tablet, Take 500 mg by mouth Daily., Disp: , Rfl:   •  Vortioxetine HBr (Trintellix) 20 MG tablet, Take 20 mg by mouth Daily., Disp: 30 tablet, Rfl: 5  •  zafirlukast (Accolate) 20 MG tablet, Take 1 tablet by mouth 2 (Two) Times a Day., Disp: 60 tablet, Rfl: 5    Allergies   Allergen Reactions   • Sulfa Antibiotics Anaphylaxis, Nausea And Vomiting and Delirium   • Invokana [Canagliflozin] Unknown - Low Severity     DKA   • Codeine Nausea And Vomiting     Can only take with Phenergan   • Morphine Unknown - Low Severity     Does not work. It causes pain       Review of Systems   Constitutional: Negative for fever.   HENT: Negative for dental problem and voice change.    Eyes: Negative for visual disturbance.   Respiratory: Negative for shortness of breath.    Cardiovascular: Negative for chest pain.   Gastrointestinal: Negative for abdominal pain.   Genitourinary: Negative for dysuria.   Musculoskeletal: Negative for arthralgias, gait problem and joint swelling.   Skin: Negative for rash.   Neurological: Negative for speech difficulty.   Hematological: Does not bruise/bleed easily.   Psychiatric/Behavioral: Negative for confusion.       I have reviewed the medical and surgical history, family history, social history, medications, and/or allergies, and the review of systems of this report.    Objective   Resp 18   Ht 177.8 cm (70\")   Wt 129 kg (284 lb 12.8 oz)   BMI 40.86 kg/m²  "   Physical Exam  Vitals signs and nursing note reviewed.   Constitutional:       Appearance: Normal appearance.   Pulmonary:      Effort: Pulmonary effort is normal. No respiratory distress.   Musculoskeletal:      Right knee: He exhibits normal range of motion, no swelling, no effusion, no ecchymosis, no deformity and no erythema. No tenderness found. No medial joint line and no lateral joint line tenderness noted.   Neurological:      Mental Status: He is alert and oriented to person, place, and time.       Right Knee Exam     Range of Motion   Extension: 0   Flexion: 120     Other   Sensation: normal  Pulse: present  Effusion: no effusion present           Extremity DVT signs are negative on physical exam with negative Don sign   Neurologic Exam     Mental Status   Oriented to person, place, and time.              Assessment/Plan   Independent Review of Radiographic Studies:    No new imaging done today.      Procedures       Diagnoses and all orders for this visit:    1. S/P right knee arthroscopy (Primary)    2. Primary localized osteoarthritis of right knee       Orthopedic activities reviewed and patient expressed appreciation  Discussion of orthopedic goals  Risk, benefits, and merits of treatment alternatives reviewed with the patient and questions answered    Recommendations/Plan:  Exercise, medications, injections, other patient advice, and return appointment as noted.  Patient is encouraged to call or return for any issues or concerns.    Follow up as needed    Patient agreeable to call or return sooner for any concerns.             EMR Dragon-transcription disclaimer:  This encounter note is an electronic transcription of spoken language to printed text.  Electronic transcription of spoken language may permit erroneous or at times nonsensical words or phrases to be inadvertently transcribed.  Although I have reviewed the note for such errors, some may still exist

## 2021-01-14 ENCOUNTER — OFFICE VISIT (OUTPATIENT)
Dept: GASTROENTEROLOGY | Facility: CLINIC | Age: 61
End: 2021-01-14

## 2021-01-14 VITALS
BODY MASS INDEX: 41.09 KG/M2 | WEIGHT: 287 LBS | HEIGHT: 70 IN | TEMPERATURE: 96.2 F | RESPIRATION RATE: 20 BRPM | DIASTOLIC BLOOD PRESSURE: 75 MMHG | SYSTOLIC BLOOD PRESSURE: 150 MMHG | HEART RATE: 87 BPM

## 2021-01-14 DIAGNOSIS — K75.81 NASH (NONALCOHOLIC STEATOHEPATITIS): ICD-10-CM

## 2021-01-14 DIAGNOSIS — R10.31 RIGHT LOWER QUADRANT ABDOMINAL PAIN: ICD-10-CM

## 2021-01-14 DIAGNOSIS — K76.0 FATTY LIVER: Primary | ICD-10-CM

## 2021-01-14 DIAGNOSIS — R79.89 ELEVATED LFTS: ICD-10-CM

## 2021-01-14 PROCEDURE — 99214 OFFICE O/P EST MOD 30 MIN: CPT | Performed by: INTERNAL MEDICINE

## 2021-01-14 RX ORDER — VITAMIN E 268 MG
400 CAPSULE ORAL 2 TIMES DAILY
Qty: 60 CAPSULE | Refills: 5 | Status: SHIPPED | OUTPATIENT
Start: 2021-01-14 | End: 2021-02-23 | Stop reason: SDUPTHER

## 2021-01-14 RX ORDER — DICYCLOMINE HYDROCHLORIDE 10 MG/5ML
20 SOLUTION ORAL 3 TIMES DAILY PRN
Qty: 473 ML | Refills: 1 | Status: ON HOLD | OUTPATIENT
Start: 2021-01-14 | End: 2021-08-09

## 2021-01-14 NOTE — PROGRESS NOTES
Follow Up Note     Date: 2021   Patient Name: Denzel Harding  MRN: [id number]  : 1960     Referring Physician: Isaura Godoy MD    Chief Complaint:    Chief Complaint   Patient presents with   • Liver Follow-up   • Elevated Hepatic Enzymes   • Colon Polyps       Interval History:   2021  Denzel Harding is a 60 y.o. male who is here today for follow up for elevated lfts. He has been noticing lot of pain RLQ. It was mild but recently it is little more intense and frequent. He has been constipated but will have BM daily with stool softer.  He has not had any hepatitis vaccine    2020  Denzel Harding is a 60 y.o. male who is here today for follow up after his recent colonoscopy.  He denies any abdominal pain no nausea vomiting.  Bowel movements regular now.   He is here to discuss the recent lab work done for his liver.  He is here also to discuss his pathology reports after polyps removed with a colonoscopy recently.     2020  Denzel Harding is a 60 y.o. male who is here today to establish care with Gastroenterology for  Colon cancer screening.   This patient deny any chronic abdominal pain except occasional RLQ  discomfort with occasional bloating. Deny any recent change in bowel habit, hematochezia or melena.  Normalyy will have BM once in 1-2 days, firm stool. Weight is stable. Pt denies nausea vomiting or odynophagia or dysphagia. There is a history of acid reflux, He is on ppi with good control.   There is no history of anemia. Prior history of EGD or colonoscopy in . Few polyps removed.  No family history of colon cancer or any GI malignancy. No history of any abdominal surgery except GB removed. Denies alcohol abuse or cigarette smoking.   He is on ASA and plavix. He had a CABG for CAD  He is on narco rarely.   Subjective      Past Medical History:   Past Medical History:   Diagnosis Date   • Anxiety    • Arthritis    • Asthma    • Brachial neuritis 2017   •  Cellulitis     left arm and right leg    • Chest pain    • CHF (congestive heart failure) (CMS/HCC)     Patient reported history May 2020    • COPD (chronic obstructive pulmonary disease) (CMS/[facility])    • Coronary artery disease     Patient reported CABG , 3 vessel   • Depression    • Diabetes mellitus (CMS/[facility])     diagnosed in 1998, checks fsbg 3-4x/day   • Dizziness    • Edema    • Elevated cholesterol    • GERD (gastroesophageal reflux disease)    • H/O chest x-ray 07/04/2015    Mild Left base atelectasis   • H/O echocardiogram 07/05/2015    Normal LVSF. EF of 60-65%. Grade 1 diastolic dysfunction of the LV myocardium. No evidence of pericardial effusion   • H/O exercise stress test    • Hearing loss     No use of hearing aids   • History of fracture     Reported 2 bones in left foot and a finger   • History of herniated intervertebral disc     History of left L5-S1 disc herniation   • History of pneumonia    • History of pneumonia    • Hyperlipidemia    • Hypertension    • Impaired functional mobility, balance, gait, and endurance    • LOM (loss of memory) 1/19/2017   • Lower back pain     Right   • Measles    • MUSE (nonalcoholic steatohepatitis)    • Neuropathy     feet    • EDD treated with BiPAP     BiPAP HS - instructed to bring mask/machine DOS (setting 13 and 5)   • Palpitations    • Pericardial effusion    • Poor dentition     Patient reported missing multiple teeth   • Renal disorder    • Seizure disorder (CMS/[facility])     focal seizures   • SOB (shortness of breath)    • Staph infection     back after sugery at the incision site    • Stroke (CMS/[facility])     x2 most recent , slight weakness in hands occasionaly    • Wears glasses      Past Surgical History:   Past Surgical History:   Procedure Laterality Date   • BACK SURGERY     • CARDIAC CATHETERIZATION     • CARDIAC CATHETERIZATION N/A 8/11/2017    Procedure: Coronary angiography;  Surgeon: Dallas Kim MD;  Location: [facility] CATH INVASIVE  LOCATION;  Service:    • CARDIAC CATHETERIZATION N/A 8/11/2017    Procedure: Left Heart Cath;  Surgeon: Dallas Hernandez MD;  Location: [facility] CATH INVASIVE LOCATION;  Service:    • CARDIAC CATHETERIZATION N/A 8/11/2017    Procedure: Left ventriculography;  Surgeon: Dallas Hernandez MD;  Location: [facility] CATH INVASIVE LOCATION;  Service:    • CARDIAC CATHETERIZATION      2002 x1 stent,  x1 stent   • CARDIOVASCULAR STRESS TEST  07/03/2017    WITH DR HERNANDEZ AT [facility]   • CARPAL TUNNEL RELEASE Right    • CATARACT EXTRACTION W/ INTRAOCULAR LENS IMPLANT Right 6/12/2017    Procedure: CATARACT PHACO EXTRACTION WITH INTRAOCULAR LENS IMPLANT RIGHT ;  Surgeon: Natalie Cao MD;  Location: [facility] OR;  Service:    • CATARACT EXTRACTION W/ INTRAOCULAR LENS IMPLANT Left 7/10/2017    Procedure: CATARACT PHACO EXTRACTION WITH INTRAOCULAR LENS IMPLANT LEFT;  Surgeon: Natalie Cao MD;  Location: [facility] OR;  Service:    • CHOLECYSTECTOMY     • COLONOSCOPY     • COLONOSCOPY N/A 7/2/2020    Procedure: COLONOSCOPY;  Surgeon: Petra Crowell MD;  Location: [facility] ENDOSCOPY;  Service: Gastroenterology;  Laterality: N/A;  poor prep   • CORONARY ANGIOPLASTY WITH STENT PLACEMENT      X1-LAD   • CORONARY ARTERY BYPASS GRAFT N/A 8/15/2017    Procedure: CORONARY ARTERY BYPASS GRAFTING x 3 UTILIZING THE LEFT INTERNAL MAMMARY ARTERY WITH ENDOSCOPIC VEIN HARVESTING OF THE RIGHT GREATER SAPHENOUS VEIN, HAMLET, LAD ENDARECTOMY;  Surgeon: London Damon MD;  Location: [facility] OR;  Service:    • ENDOSCOPY     • EYE CAPSULOTOMY WITH LASER Right 11/25/2019    Procedure: EYE CAPSULOTOMY WITH LASER RIGHT;  Surgeon: Natalie Cao MD;  Location: [facility] OR;  Service: Ophthalmology   • EYE CAPSULOTOMY WITH LASER Left 12/9/2019    Procedure: EYE CAPSULOTOMY WITH LASER LEFT;  Surgeon: Natalie Cao MD;  Location: [facility] OR;  Service: Ophthalmology   • EYE SURGERY      cataract surgery both eyes   • INTERVENTIONAL RADIOLOGY PROCEDURE Bilateral  2019    Procedure: Carotid Cerebral Angiogram;  Surgeon: Damian Dubois MD;  Location:  [facility] CATH INVASIVE LOCATION;  Service: Interventional Radiology   • KNEE ARTHROSCOPY Bilateral    • KNEE ARTHROSCOPY Right     Dr Lewis   • KNEE ARTHROSCOPY Left     Dr Jameson   • KNEE MENISCECTOMY Right 10/15/2020    Procedure: Right knee arthroscopy with partial medial and lateral meniscectomy and three compartment chondroplasty.;  Surgeon: South Lewis MD;  Location: [facility] OR;  Service: Orthopedics;  Laterality: Right;   • MOUTH SURGERY      Oral extractions   • NEUROPLASTY / TRANSPOSITION ULNAR NERVE AT ELBOW Left    • OTHER SURGICAL HISTORY      Foraminotomy and discectomy   • PERICARDIAL WINDOW N/A 2017    Procedure: PERICARDIAL WINDOW;  Surgeon: Freeman Phillips MD;  Location:  [facility] OR;  Service:        Family History:   Family History   Problem Relation Age of Onset   • Hypertension Mother    • Arthritis Mother    • Alcohol abuse Father    • Heart disease Other    • Stroke Other    • Hypertension Other    • Other Other         Neurologic disorder   • Parkinsonism Other    • Stroke Other    • Heart disease Other    • Hypertension Other    • Heart disease Other    • Stroke Other    • Hypertension Other    • Colon cancer Neg Hx        Social History:   Social History     Socioeconomic History   • Marital status:      Spouse name: Not on file   • Number of children: 1   • Years of education: Not on file   • Highest education level: Not on file   Occupational History   • Occupation: [employer]     Employer: DISABLED     Comment: Disabled since -Back Problems   Tobacco Use   • Smoking status: Former Smoker     Packs/day: 1.00     Years: 10.00     Pack years: 10.00     Types: Cigarettes     Quit date: 1998     Years since quittin.0   • Smokeless tobacco: Former User     Types: Snuff     Quit date:    Substance and Sexual Activity   • Alcohol use: Yes      Frequency: Monthly or less     Drinks per session: 3 or 4     Comment: 3 drinks a year   • Drug use: No   • Sexual activity: Defer   Social History Narrative    Caffeine use: 0-1 serving daily.    Patient lives at home with wife.        Medications:     Current Outpatient Medications:   •  albuterol sulfate HFA (Ventolin HFA) 108 (90 Base) MCG/ACT inhaler, Inhale 2 puffs Every 4 (Four) Hours As Needed for Wheezing or Shortness of Air., Disp: 1 inhaler, Rfl: 4  •  amitriptyline (ELAVIL) 25 MG tablet, TAKE 3 TABLETS BY MOUTH EVERY NIGHT AT BEDTIME, Disp: 270 tablet, Rfl: 0  •  Asmanex  MCG/ACT aerosol, Inhale 1 puff 2 (Two) Times a Day., Disp: 1 inhaler, Rfl: 5  •  aspirin 81 MG EC tablet, Take 81 mg by mouth Daily., Disp: , Rfl:   •  atorvastatin (LIPITOR) 80 MG tablet, Take 1 tablet by mouth Every Night., Disp: 30 tablet, Rfl: 5  •  budesonide-formoterol (Symbicort) 80-4.5 MCG/ACT inhaler, Inhale 2 puffs 2 (Two) Times a Day. Rinse mouth with water after use., Disp: 1 inhaler, Rfl: 5  •  cloNIDine (Catapres) 0.1 MG tablet, Take 1 tablet by mouth every 6 hours as needed for SBP > 170 or DBP > 100, Disp: 120 tablet, Rfl: 0  •  clopidogrel (PLAVIX) 75 MG tablet, Take 1 tablet by mouth Daily., Disp: 90 tablet, Rfl: 3  •  coenzyme Q10 100 MG capsule, Take 100 mg by mouth Daily., Disp: , Rfl:   •  Cycloset 0.8 MG tablet, Take 3.2 mg by mouth Every Morning., Disp: 120 tablet, Rfl: 5  •  docusate sodium (Colace) 100 MG capsule, Take 1 capsule by mouth 2 (Two) Times a Day As Needed for Constipation., Disp: 60 capsule, Rfl: 3  •  Dupixent 300 MG/2ML solution prefilled syringe, INJECT 300 MG SUBCUTANEOUSLY EVERY OTHER WEEK, Disp: 4 syringe, Rfl: 4  •  gabapentin (NEURONTIN) 600 MG tablet, Take 1 tablet by mouth 3 (Three) Times a Day., Disp: 270 tablet, Rfl: 3  •  HumaLOG KwikPen 100 UNIT/ML solution pen-injector, Inject 50 units tid before meals, Disp: 135 mL, Rfl: 1  •  ipratropium-albuterol (DUO-NEB) 0.5-2.5 mg/3 ml  nebulizer, Take 3 mL by nebulization 4 (Four) Times a Day As Needed for Wheezing or Shortness of Air., Disp: 360 mL, Rfl: 5  •  levocetirizine (XYZAL) 5 MG tablet, Take 1 tablet by mouth Every Evening., Disp: 90 tablet, Rfl: 3  •  lisinopril (PRINIVIL,ZESTRIL) 5 MG tablet, Take 1 tablet by mouth Daily., Disp: 90 tablet, Rfl: 3  •  meclizine (ANTIVERT) 25 MG tablet, Take 1 tablet by mouth 3 (Three) Times a Day As Needed for dizziness., Disp: 10 tablet, Rfl: 0  •  metoprolol tartrate (LOPRESSOR) 25 MG tablet, Take 1/2 tablet by mouth 2 (Two) times a day for 7 days, then 1/2 tablet once a day for 7 days,  then stop (Patient taking differently: Take 25 mg by mouth 2 (Two) Times a Day.), Disp: 11 tablet, Rfl: 0  •  omeprazole (priLOSEC) 20 MG capsule, Take 1 capsule by mouth Daily., Disp: 30 capsule, Rfl: 5  •  promethazine (PHENERGAN) 25 MG tablet, Take 1 tablet by mouth Every 6 (Six) Hours As Needed for Nausea or Vomiting., Disp: 20 tablet, Rfl: 0  •  Semaglutide, 1 MG/DOSE, (Ozempic, 1 MG/DOSE,) 2 MG/1.5ML solution pen-injector, Inject 1 mg under the skin into the appropriate area as directed 1 (One) Time Per Week., Disp: 9 mL, Rfl: 3  •  spironolactone (ALDACTONE) 25 MG tablet, Take 1 tablet by mouth Daily., Disp: 30 tablet, Rfl: 0  •  Tiotropium Bromide Monohydrate (Spiriva Respimat) 1.25 MCG/ACT aerosol solution inhaler, Inhale 2 puffs Daily., Disp: 1 inhaler, Rfl: 5  •  tiZANidine (ZANAFLEX) 4 MG tablet, TAKE 1 CAPSULE BY MOUTH AT NIGHT AS NEEDED FOR MUSCLE SPASMS, Disp: 30 tablet, Rfl: 1  •  Toujeo SoloStar 300 UNIT/ML solution pen-injector injection, Inject 125 Units under the skin into the appropriate area as directed 2 (two) times a day. One month supply, Disp: 235 mL, Rfl: 3  •  TURMERIC PO, Take 500 mg by mouth 2 (Two) Times a Day., Disp: , Rfl:   •  vitamin C (ASCORBIC ACID) 500 MG tablet, Take 500 mg by mouth Daily., Disp: , Rfl:   •  Vortioxetine HBr (Trintellix) 20 MG tablet, Take 20 mg by mouth Daily.,  "Disp: 30 tablet, Rfl: 5  •  zafirlukast (Accolate) 20 MG tablet, Take 1 tablet by mouth 2 (Two) Times a Day., Disp: 60 tablet, Rfl: 5    Allergies:   Allergies   Allergen Reactions   • Sulfa Antibiotics Anaphylaxis, Nausea And Vomiting and Delirium   • Invokana [Canagliflozin] Unknown - Low Severity     DKA   • Codeine Nausea And Vomiting     Can only take with Phenergan   • Morphine Unknown - Low Severity     Does not work. It causes pain       Review of Systems:   Review of Systems   Constitutional: Positive for unexpected weight loss. Negative for appetite change and fatigue.   HENT: Negative for trouble swallowing.    Gastrointestinal: Positive for abdominal distention, abdominal pain and constipation. Negative for anal bleeding, blood in stool, diarrhea, nausea, rectal pain, vomiting, GERD and indigestion.       The following portions of the patient's history were reviewed and updated as appropriate: allergies, current medications, past family history, past medical history, past social history, past surgical history and problem list.    Objective     Physical Exam:  Vital Signs:   Vitals:    01/14/21 1556   BP: 150/75   Pulse: 87   Resp: 20   Temp: 96.2 °F (35.7 °C)   Weight: 130 kg (287 lb)   Height: 177.8 cm (70\")       Physical Exam  Vitals signs and nursing note reviewed.   Constitutional:       Appearance: He is well-developed. He is obese.   HENT:      Head: Normocephalic and atraumatic.      Right Ear: External ear normal.      Left Ear: External ear normal.   Eyes:      Conjunctiva/sclera: Conjunctivae normal.   Neck:      Musculoskeletal: Normal range of motion and neck supple.      Thyroid: No thyromegaly.      Trachea: No tracheal deviation.   Cardiovascular:      Rate and Rhythm: Normal rate and regular rhythm.      Heart sounds: No murmur.   Pulmonary:      Effort: Pulmonary effort is normal. No respiratory distress.      Breath sounds: Normal breath sounds.   Abdominal:      General: Bowel sounds " are normal. There is no distension.      Palpations: Abdomen is soft. There is no mass.      Tenderness: There is no abdominal tenderness (Discomfort lower to lower quadrant on the palpation).      Hernia: No hernia is present.   Musculoskeletal: Normal range of motion.   Skin:     General: Skin is warm and dry.   Neurological:      Mental Status: He is alert and oriented to person, place, and time.      Cranial Nerves: No cranial nerve deficit.      Sensory: No sensory deficit.   Psychiatric:         Mood and Affect: Mood normal.         Behavior: Behavior normal.         Thought Content: Thought content normal.         Judgment: Judgment normal.         Results Review:   I reviewed the patient's new clinical results.    Office Visit on 12/09/2020   Component Date Value Ref Range Status   • Hemoglobin A1C 12/09/2020 12.5  % Final   • Lot Number 12/09/2020 [id number]   Final   • Expiration Date 12/09/2020 09/01/2022   Final   Admission on 11/15/2020, Discharged on 11/15/2020   Component Date Value Ref Range Status   • Glucose 11/15/2020 305* 65 - 99 mg/dL Final    Glucose >180, Hemoglobin A1C recommended.   • BUN 11/15/2020 9  6 - 20 mg/dL Final   • Creatinine 11/15/2020 0.74* 0.76 - 1.27 mg/dL Final   • Sodium 11/15/2020 136  136 - 145 mmol/L Final   • Potassium 11/15/2020 3.8  3.5 - 5.2 mmol/L Final   • Chloride 11/15/2020 99  98 - 107 mmol/L Final   • CO2 11/15/2020 26.4  22.0 - 29.0 mmol/L Final   • Calcium 11/15/2020 9.5  8.6 - 10.5 mg/dL Final   • Total Protein 11/15/2020 7.2  6.0 - 8.5 g/dL Final   • Albumin 11/15/2020 4.10  3.50 - 5.20 g/dL Final   • ALT (SGPT) 11/15/2020 76* 1 - 41 U/L Final   • AST (SGOT) 11/15/2020 54* 1 - 40 U/L Final   • Alkaline Phosphatase 11/15/2020 115  39 - 117 U/L Final   • Total Bilirubin 11/15/2020 0.9  0.0 - 1.2 mg/dL Final   • eGFR Non African Amer 11/15/2020 108  >60 mL/min/1.73 Final   • Globulin 11/15/2020 3.1  gm/dL Final   • A/G Ratio 11/15/2020 1.3  g/dL Final   •  BUN/Creatinine Ratio 11/15/2020 12.2  7.0 - 25.0 Final   • Anion Gap 11/15/2020 10.6  5.0 - 15.0 mmol/L Final   • Troponin T 11/15/2020 <0.010  0.000 - 0.030 ng/mL Final   • COVID19 11/15/2020 Not Detected  Not Detected - Ref. Range Final   • proBNP 11/15/2020 49.2  0.0 - 900.0 pg/mL Final   • Extra Tube 11/15/2020 hold for add-on   Final    Auto resulted   • Extra Tube 11/15/2020 Hold for add-ons.   Final    Auto resulted.   • Extra Tube 11/15/2020 hold for add-on   Final    Auto resulted   • Extra Tube 11/15/2020 Hold for add-ons.   Final    Auto resulted.   • WBC 11/15/2020 7.53  3.40 - 10.80 10*3/mm3 Final   • RBC 11/15/2020 5.78  4.14 - 5.80 10*6/mm3 Final   • Hemoglobin 11/15/2020 13.3  13.0 - 17.7 g/dL Final   • Hematocrit 11/15/2020 42.6  37.5 - 51.0 % Final   • MCV 11/15/2020 73.7* 79.0 - 97.0 fL Final   • MCH 11/15/2020 23.0* 26.6 - 33.0 pg Final   • MCHC 11/15/2020 31.2* 31.5 - 35.7 g/dL Final   • RDW 11/15/2020 15.5* 12.3 - 15.4 % Final   • RDW-SD 11/15/2020 41.0  37.0 - 54.0 fl Final   • MPV 11/15/2020 10.2  6.0 - 12.0 fL Final   • Platelets 11/15/2020 256  140 - 450 10*3/mm3 Final   • Neutrophil % 11/15/2020 66.4  42.7 - 76.0 % Final   • Lymphocyte % 11/15/2020 20.6  19.6 - 45.3 % Final   • Monocyte % 11/15/2020 6.2  5.0 - 12.0 % Final   • Eosinophil % 11/15/2020 5.3  0.3 - 6.2 % Final   • Basophil % 11/15/2020 0.8  0.0 - 1.5 % Final   • Immature Grans % 11/15/2020 0.7* 0.0 - 0.5 % Final   • Neutrophils, Absolute 11/15/2020 5.00  1.70 - 7.00 10*3/mm3 Final   • Lymphocytes, Absolute 11/15/2020 1.55  0.70 - 3.10 10*3/mm3 Final   • Monocytes, Absolute 11/15/2020 0.47  0.10 - 0.90 10*3/mm3 Final   • Eosinophils, Absolute 11/15/2020 0.40  0.00 - 0.40 10*3/mm3 Final   • Basophils, Absolute 11/15/2020 0.06  0.00 - 0.20 10*3/mm3 Final   • Immature Grans, Absolute 11/15/2020 0.05  0.00 - 0.05 10*3/mm3 Final   • nRBC 11/15/2020 0.0  0.0 - 0.2 /100 WBC Final   • Procalcitonin 11/15/2020 0.09  0.00 - 0.25 ng/mL  Final   • Microcytes 11/15/2020 Slight/1+  None Seen Final   • WBC Morphology 11/15/2020 Normal  Normal Final   • Platelet Estimate 11/15/2020 Adequate  Normal Final      Us Abdomen Complete    Result Date: 12/1/2020  Fatty infiltration of the liver and splenomegaly  This report was finalized on 12/1/2020 11:12 AM by Aspen Decker M.D..    Xr Chest 1 View    Result Date: 11/15/2020  No acute cardiopulmonary process .  This report was finalized on 11/15/2020 4:13 PM by Dr Lalito Grissom DO.      Assessment / Plan        1.  Nonalcoholic steatohepatitis pre-cirrhotic  MSUE fibro-sure; F3 fibrosis,N3 steatosis,, N1 steatohepatitis  1/14/2021  Compared to last year he lost about 20 pounds gained 7 pounds recently.  His ultrasound abdomen done in August 2020 revealed fatty liver disease with splenomegaly suggesting some portal hypertension.  Given his F3 fibrosis we will start him on vitamin E therapy.   He still working on his visit to bariatric surgery for possible sleeve surgery  We will prescribe hepatitis B and a combined vaccine with a booster  Vitamin E4 100 units p.o. twice daily  He needs a repeat EGD to rule out esophageal varices, we will schedule that next visit as per patient request  CBC CMP PT/INR in 6 months time    8/12/2020   iron studies reveal a normal iron and iron saturation ruling out hemochromatosis.  Hep C antibody negative.  He is not immune to hep A and hep B.  Ceruloplasmin level normal.  Anti-smooth muscle antibody antimitochondrial antibody antinuclear antibody were negative.  No pulmonary issues.   Needs a hep A hep B vaccine at PCPs office or health department  Low-salt diet discussed.  Also discussed the Mediterranean diet.   He is intolerant to metformin, he cannot take Actos due to his cardiac issues.  We can consider vitamin  E optional if bariatric surgery is declined  We will get an ultrasound liver  He needs a repeat EGD to rule out esophageal varices, will schedule EGD next visit  after ultrasound abdomen     2.  Lower abdominal pain  Patient has been having intermittent lower abdominal pain mainly in the right lower abdomen for the past few years recently got worse.   He has a associated constipation.  This could be constipation related versus irritable bowel syndrome.  His recent ultrasound and CT scan abdomen pelvis done in 2019 did not reveal any significant intra-abdominal pathology except fatty liver disease and splenomegaly  We will continue Colace as he is having regular by BM with the Colace  I have started him on a low-dose of dicyclomine for pain      3.  Adenomatous colon polyp  He had a colonoscopy done on 7/2/2020 .  Prep was suboptimal with a solid stool in the colon.  He had 3 polyps removed all less than 1 cm descending, sigmoid, rectum.  2 of the the polyps came out as a tubular adenoma.  I have discussed the pathology report today.    Given his poor prep will repeat the colonoscopy in 3 years time in 2023     4. Morbidly obese (CMS/HCC)  Patient states that he cannot exercise.  He is also states that he tried dietary change which did not help.  His BMI is 44.  Most of his symptoms stem from his obesity  We will get bariatric surgery referral for possible gastric bypass or gastric sleeve surgery.  This would probably prevent the progression of his liver disease and progression to cirrhosis    5. Gastroesophageal reflux disease without esophagitis  Chronic history of reflux disease.  He is non-smoker  Heartburn symptoms well controlled with the low-dose PPI.  Last EGD 5 years ago done at [facility] showed only minimal gastric inflammation per patient  Reflux diet discussed and advised to continue the PPI for now           Follow Up:   No follow-ups on file.    Petra Crowell MD  [facility]  1/14/2021  15:58 EST     Please note that portions of this note may have been completed with a voice recognition program.

## 2021-01-18 DIAGNOSIS — E78.2 MIXED HYPERLIPIDEMIA: ICD-10-CM

## 2021-01-18 DIAGNOSIS — K21.00 GASTROESOPHAGEAL REFLUX DISEASE WITH ESOPHAGITIS WITHOUT HEMORRHAGE: ICD-10-CM

## 2021-01-19 RX ORDER — ATORVASTATIN CALCIUM 80 MG/1
80 TABLET, FILM COATED ORAL NIGHTLY
Qty: 30 TABLET | Refills: 0 | Status: SHIPPED | OUTPATIENT
Start: 2021-01-19 | End: 2021-02-23 | Stop reason: SDUPTHER

## 2021-01-19 RX ORDER — OMEPRAZOLE 20 MG/1
20 CAPSULE, DELAYED RELEASE ORAL DAILY
Qty: 30 CAPSULE | Refills: 0 | Status: SHIPPED | OUTPATIENT
Start: 2021-01-19 | End: 2021-02-23 | Stop reason: SDUPTHER

## 2021-01-26 ENCOUNTER — HOSPITAL ENCOUNTER (OUTPATIENT)
Facility: HOSPITAL | Age: 61
Setting detail: OBSERVATION
Discharge: HOME | End: 2021-01-29
Attending: EMERGENCY MEDICINE | Admitting: INTERNAL MEDICINE

## 2021-01-26 ENCOUNTER — APPOINTMENT (OUTPATIENT)
Dept: GENERAL RADIOLOGY | Facility: HOSPITAL | Age: 61
End: 2021-01-26

## 2021-01-26 DIAGNOSIS — I10 BENIGN ESSENTIAL HYPERTENSION: ICD-10-CM

## 2021-01-26 DIAGNOSIS — R07.9 CHEST PAIN, UNSPECIFIED TYPE: Primary | ICD-10-CM

## 2021-01-26 LAB
ALBUMIN SERPL-MCNC: 4.3 G/DL (ref 3.5–5.2)
ALBUMIN/GLOB SERPL: 1.7 G/DL
ALP SERPL-CCNC: 100 U/L (ref 39–117)
ALT SERPL W P-5'-P-CCNC: 61 U/L (ref 1–41)
ANION GAP SERPL CALCULATED.3IONS-SCNC: 11 MMOL/L (ref 5–15)
AST SERPL-CCNC: 48 U/L (ref 1–40)
BASOPHILS # BLD AUTO: 0.07 10*3/MM3 (ref 0–0.2)
BASOPHILS NFR BLD AUTO: 0.8 % (ref 0–1.5)
BILIRUB SERPL-MCNC: 0.7 MG/DL (ref 0–1.2)
BUN SERPL-MCNC: 11 MG/DL (ref 6–20)
BUN/CREAT SERPL: 11.7 (ref 7–25)
CALCIUM SPEC-SCNC: 9.4 MG/DL (ref 8.6–10.5)
CHLORIDE SERPL-SCNC: 96 MMOL/L (ref 98–107)
CHOLEST SERPL-MCNC: 102 MG/DL (ref 0–200)
CO2 SERPL-SCNC: 29 MMOL/L (ref 22–29)
CREAT SERPL-MCNC: 0.94 MG/DL (ref 0.76–1.27)
DEPRECATED RDW RBC AUTO: 45.2 FL (ref 37–54)
EOSINOPHIL # BLD AUTO: 0.21 10*3/MM3 (ref 0–0.4)
EOSINOPHIL NFR BLD AUTO: 2.5 % (ref 0.3–6.2)
ERYTHROCYTE [DISTWIDTH] IN BLOOD BY AUTOMATED COUNT: 18.2 % (ref 12.3–15.4)
GFR SERPL CREATININE-BSD FRML MDRD: 82 ML/MIN/1.73
GLOBULIN UR ELPH-MCNC: 2.5 GM/DL
GLUCOSE BLDC GLUCOMTR-MCNC: 291 MG/DL (ref 70–130)
GLUCOSE SERPL-MCNC: 328 MG/DL (ref 65–99)
HBA1C MFR BLD: 12.3 % (ref 4.8–5.6)
HCT VFR BLD AUTO: 45.9 % (ref 37.5–51)
HDLC SERPL-MCNC: 24 MG/DL (ref 40–60)
HGB BLD-MCNC: 14.9 G/DL (ref 13–17.7)
HOLD SPECIMEN: NORMAL
HOLD SPECIMEN: NORMAL
IMM GRANULOCYTES # BLD AUTO: 0.07 10*3/MM3 (ref 0–0.05)
IMM GRANULOCYTES NFR BLD AUTO: 0.8 % (ref 0–0.5)
LDLC SERPL CALC-MCNC: 53 MG/DL (ref 0–100)
LDLC/HDLC SERPL: 2.09 {RATIO}
LYMPHOCYTES # BLD AUTO: 1.73 10*3/MM3 (ref 0.7–3.1)
LYMPHOCYTES NFR BLD AUTO: 20.9 % (ref 19.6–45.3)
MCH RBC QN AUTO: 24 PG (ref 26.6–33)
MCHC RBC AUTO-ENTMCNC: 32.5 G/DL (ref 31.5–35.7)
MCV RBC AUTO: 73.9 FL (ref 79–97)
MICROCYTES BLD QL: NORMAL
MONOCYTES # BLD AUTO: 0.53 10*3/MM3 (ref 0.1–0.9)
MONOCYTES NFR BLD AUTO: 6.4 % (ref 5–12)
NEUTROPHILS NFR BLD AUTO: 5.65 10*3/MM3 (ref 1.7–7)
NEUTROPHILS NFR BLD AUTO: 68.6 % (ref 42.7–76)
NRBC BLD AUTO-RTO: 0 /100 WBC (ref 0–0.2)
NT-PROBNP SERPL-MCNC: 24.1 PG/ML (ref 0–900)
PLATELET # BLD AUTO: 240 10*3/MM3 (ref 140–450)
PMV BLD AUTO: 10.6 FL (ref 6–12)
POTASSIUM SERPL-SCNC: 4.1 MMOL/L (ref 3.5–5.2)
PROT SERPL-MCNC: 6.8 G/DL (ref 6–8.5)
RBC # BLD AUTO: 6.21 10*6/MM3 (ref 4.14–5.8)
SMALL PLATELETS BLD QL SMEAR: ADEQUATE
SODIUM SERPL-SCNC: 136 MMOL/L (ref 136–145)
TRIGL SERPL-MCNC: 139 MG/DL (ref 0–150)
TROPONIN T SERPL-MCNC: <0.01 NG/ML (ref 0–0.03)
TROPONIN T SERPL-MCNC: <0.01 NG/ML (ref 0–0.03)
TSH SERPL DL<=0.05 MIU/L-ACNC: 0.88 UIU/ML (ref 0.27–4.2)
VLDLC SERPL-MCNC: 25 MG/DL (ref 5–40)
WBC # BLD AUTO: 8.26 10*3/MM3 (ref 3.4–10.8)
WBC MORPH BLD: NORMAL
WHOLE BLOOD HOLD SPECIMEN: NORMAL

## 2021-01-26 PROCEDURE — 83880 ASSAY OF NATRIURETIC PEPTIDE: CPT | Performed by: EMERGENCY MEDICINE

## 2021-01-26 PROCEDURE — 85007 BL SMEAR W/DIFF WBC COUNT: CPT | Performed by: EMERGENCY MEDICINE

## 2021-01-26 PROCEDURE — 85025 COMPLETE CBC W/AUTO DIFF WBC: CPT | Performed by: EMERGENCY MEDICINE

## 2021-01-26 PROCEDURE — 80053 COMPREHEN METABOLIC PANEL: CPT | Performed by: EMERGENCY MEDICINE

## 2021-01-26 PROCEDURE — G0378 HOSPITAL OBSERVATION PER HR: HCPCS

## 2021-01-26 PROCEDURE — 25010000002 FENTANYL CITRATE (PF) 100 MCG/2ML SOLUTION: Performed by: EMERGENCY MEDICINE

## 2021-01-26 PROCEDURE — 82962 GLUCOSE BLOOD TEST: CPT

## 2021-01-26 PROCEDURE — 80061 LIPID PANEL: CPT | Performed by: EMERGENCY MEDICINE

## 2021-01-26 PROCEDURE — 84484 ASSAY OF TROPONIN QUANT: CPT | Performed by: EMERGENCY MEDICINE

## 2021-01-26 PROCEDURE — 96374 THER/PROPH/DIAG INJ IV PUSH: CPT

## 2021-01-26 PROCEDURE — 99220 PR INITIAL OBSERVATION CARE/DAY 70 MINUTES: CPT | Performed by: EMERGENCY MEDICINE

## 2021-01-26 PROCEDURE — 96375 TX/PRO/DX INJ NEW DRUG ADDON: CPT

## 2021-01-26 PROCEDURE — 25010000002 ENOXAPARIN PER 10 MG: Performed by: EMERGENCY MEDICINE

## 2021-01-26 PROCEDURE — 63710000001 INSULIN DETEMIR PER 5 UNITS: Performed by: EMERGENCY MEDICINE

## 2021-01-26 PROCEDURE — 71045 X-RAY EXAM CHEST 1 VIEW: CPT

## 2021-01-26 PROCEDURE — 83036 HEMOGLOBIN GLYCOSYLATED A1C: CPT | Performed by: EMERGENCY MEDICINE

## 2021-01-26 PROCEDURE — 25010000002 ONDANSETRON PER 1 MG: Performed by: EMERGENCY MEDICINE

## 2021-01-26 PROCEDURE — 96372 THER/PROPH/DIAG INJ SC/IM: CPT

## 2021-01-26 PROCEDURE — 93005 ELECTROCARDIOGRAM TRACING: CPT | Performed by: EMERGENCY MEDICINE

## 2021-01-26 PROCEDURE — 99284 EMERGENCY DEPT VISIT MOD MDM: CPT

## 2021-01-26 PROCEDURE — 84443 ASSAY THYROID STIM HORMONE: CPT | Performed by: EMERGENCY MEDICINE

## 2021-01-26 RX ORDER — NICOTINE POLACRILEX 4 MG
1 LOZENGE BUCCAL
Status: DISCONTINUED | OUTPATIENT
Start: 2021-01-26 | End: 2021-01-29 | Stop reason: HOSPADM

## 2021-01-26 RX ORDER — GABAPENTIN 300 MG/1
300 CAPSULE ORAL EVERY 8 HOURS SCHEDULED
Status: DISCONTINUED | OUTPATIENT
Start: 2021-01-26 | End: 2021-01-27

## 2021-01-26 RX ORDER — PANTOPRAZOLE SODIUM 40 MG/1
40 TABLET, DELAYED RELEASE ORAL EVERY MORNING
Status: DISCONTINUED | OUTPATIENT
Start: 2021-01-27 | End: 2021-01-29 | Stop reason: HOSPADM

## 2021-01-26 RX ORDER — FENTANYL CITRATE 50 UG/ML
100 INJECTION, SOLUTION INTRAMUSCULAR; INTRAVENOUS ONCE
Status: COMPLETED | OUTPATIENT
Start: 2021-01-26 | End: 2021-01-26

## 2021-01-26 RX ORDER — AMITRIPTYLINE HYDROCHLORIDE 50 MG/1
75 TABLET, FILM COATED ORAL NIGHTLY
Status: DISCONTINUED | OUTPATIENT
Start: 2021-01-26 | End: 2021-01-29 | Stop reason: HOSPADM

## 2021-01-26 RX ORDER — ASPIRIN 81 MG/1
81 TABLET ORAL DAILY
Status: DISCONTINUED | OUTPATIENT
Start: 2021-01-27 | End: 2021-01-29 | Stop reason: HOSPADM

## 2021-01-26 RX ORDER — ACETAMINOPHEN 160 MG/5ML
650 SOLUTION ORAL EVERY 4 HOURS PRN
Status: DISCONTINUED | OUTPATIENT
Start: 2021-01-26 | End: 2021-01-29 | Stop reason: HOSPADM

## 2021-01-26 RX ORDER — CETIRIZINE HYDROCHLORIDE 10 MG/1
10 TABLET ORAL DAILY
Status: DISCONTINUED | OUTPATIENT
Start: 2021-01-27 | End: 2021-01-29 | Stop reason: HOSPADM

## 2021-01-26 RX ORDER — BUDESONIDE AND FORMOTEROL FUMARATE DIHYDRATE 80; 4.5 UG/1; UG/1
2 AEROSOL RESPIRATORY (INHALATION)
Status: DISCONTINUED | OUTPATIENT
Start: 2021-01-26 | End: 2021-01-29 | Stop reason: HOSPADM

## 2021-01-26 RX ORDER — ACETAMINOPHEN 650 MG/1
650 SUPPOSITORY RECTAL EVERY 4 HOURS PRN
Status: DISCONTINUED | OUTPATIENT
Start: 2021-01-26 | End: 2021-01-29 | Stop reason: HOSPADM

## 2021-01-26 RX ORDER — ONDANSETRON 2 MG/ML
4 INJECTION INTRAMUSCULAR; INTRAVENOUS EVERY 6 HOURS PRN
Status: DISCONTINUED | OUTPATIENT
Start: 2021-01-26 | End: 2021-01-29 | Stop reason: HOSPADM

## 2021-01-26 RX ORDER — MORPHINE SULFATE 2 MG/ML
6 INJECTION, SOLUTION INTRAMUSCULAR; INTRAVENOUS ONCE
Status: DISCONTINUED | OUTPATIENT
Start: 2021-01-26 | End: 2021-01-26

## 2021-01-26 RX ORDER — SODIUM CHLORIDE 0.9 % (FLUSH) 0.9 %
10 SYRINGE (ML) INJECTION AS NEEDED
Status: DISCONTINUED | OUTPATIENT
Start: 2021-01-26 | End: 2021-01-29 | Stop reason: HOSPADM

## 2021-01-26 RX ORDER — CLONIDINE HYDROCHLORIDE 0.1 MG/1
0.1 TABLET ORAL TAKE AS DIRECTED
Status: DISCONTINUED | OUTPATIENT
Start: 2021-01-26 | End: 2021-01-29 | Stop reason: HOSPADM

## 2021-01-26 RX ORDER — ACETAMINOPHEN 325 MG/1
650 TABLET ORAL EVERY 4 HOURS PRN
Status: DISCONTINUED | OUTPATIENT
Start: 2021-01-26 | End: 2021-01-29 | Stop reason: HOSPADM

## 2021-01-26 RX ORDER — ATORVASTATIN CALCIUM 80 MG/1
80 TABLET, FILM COATED ORAL NIGHTLY
Status: DISCONTINUED | OUTPATIENT
Start: 2021-01-26 | End: 2021-01-29 | Stop reason: HOSPADM

## 2021-01-26 RX ORDER — TIZANIDINE 4 MG/1
4 TABLET ORAL EVERY 6 HOURS PRN
Status: DISCONTINUED | OUTPATIENT
Start: 2021-01-26 | End: 2021-01-29 | Stop reason: HOSPADM

## 2021-01-26 RX ORDER — NITROGLYCERIN 0.4 MG/1
0.4 TABLET SUBLINGUAL
Status: DISCONTINUED | OUTPATIENT
Start: 2021-01-26 | End: 2021-01-29 | Stop reason: HOSPADM

## 2021-01-26 RX ORDER — SODIUM CHLORIDE 0.9 % (FLUSH) 0.9 %
10 SYRINGE (ML) INJECTION EVERY 12 HOURS SCHEDULED
Status: DISCONTINUED | OUTPATIENT
Start: 2021-01-26 | End: 2021-01-29 | Stop reason: HOSPADM

## 2021-01-26 RX ORDER — IPRATROPIUM BROMIDE AND ALBUTEROL SULFATE 2.5; .5 MG/3ML; MG/3ML
3 SOLUTION RESPIRATORY (INHALATION) EVERY 4 HOURS PRN
Status: DISCONTINUED | OUTPATIENT
Start: 2021-01-26 | End: 2021-01-29 | Stop reason: HOSPADM

## 2021-01-26 RX ORDER — ONDANSETRON 2 MG/ML
4 INJECTION INTRAMUSCULAR; INTRAVENOUS ONCE
Status: COMPLETED | OUTPATIENT
Start: 2021-01-26 | End: 2021-01-26

## 2021-01-26 RX ORDER — DEXTROSE MONOHYDRATE 25 G/50ML
25 INJECTION, SOLUTION INTRAVENOUS
Status: DISCONTINUED | OUTPATIENT
Start: 2021-01-26 | End: 2021-01-29 | Stop reason: HOSPADM

## 2021-01-26 RX ORDER — CLOPIDOGREL BISULFATE 75 MG/1
75 TABLET ORAL DAILY
Status: DISCONTINUED | OUTPATIENT
Start: 2021-01-27 | End: 2021-01-29 | Stop reason: HOSPADM

## 2021-01-26 RX ADMIN — GABAPENTIN 300 MG: 300 CAPSULE ORAL at 22:38

## 2021-01-26 RX ADMIN — ONDANSETRON 4 MG: 2 INJECTION INTRAMUSCULAR; INTRAVENOUS at 16:48

## 2021-01-26 RX ADMIN — FENTANYL CITRATE 100 MCG: 50 INJECTION, SOLUTION INTRAMUSCULAR; INTRAVENOUS at 18:51

## 2021-01-26 RX ADMIN — ENOXAPARIN SODIUM 40 MG: 40 INJECTION SUBCUTANEOUS at 22:37

## 2021-01-26 RX ADMIN — SODIUM CHLORIDE, PRESERVATIVE FREE 10 ML: 5 INJECTION INTRAVENOUS at 22:38

## 2021-01-26 RX ADMIN — NITROGLYCERIN 0.4 MG: 0.4 TABLET SUBLINGUAL at 20:15

## 2021-01-26 RX ADMIN — METOPROLOL TARTRATE 25 MG: 25 TABLET, FILM COATED ORAL at 22:37

## 2021-01-26 RX ADMIN — INSULIN DETEMIR 15 UNITS: 100 INJECTION, SOLUTION SUBCUTANEOUS at 22:38

## 2021-01-26 RX ADMIN — ATORVASTATIN CALCIUM 80 MG: 80 TABLET, FILM COATED ORAL at 22:38

## 2021-01-26 RX ADMIN — AMITRIPTYLINE HYDROCHLORIDE 75 MG: 50 TABLET, FILM COATED ORAL at 22:38

## 2021-01-27 ENCOUNTER — APPOINTMENT (OUTPATIENT)
Dept: CARDIOLOGY | Facility: HOSPITAL | Age: 61
End: 2021-01-27

## 2021-01-27 LAB
BH CV ECHO MEAS - % IVS THICK: 3.9 %
BH CV ECHO MEAS - % LVPW THICK: 29.9 %
BH CV ECHO MEAS - AO MAX PG (FULL): 1.1 MMHG
BH CV ECHO MEAS - AO MAX PG: 4 MMHG
BH CV ECHO MEAS - AO MEAN PG (FULL): 0 MMHG
BH CV ECHO MEAS - AO MEAN PG: 2 MMHG
BH CV ECHO MEAS - AO ROOT AREA (BSA CORRECTED): 1.4
BH CV ECHO MEAS - AO ROOT AREA: 9.3 CM^2
BH CV ECHO MEAS - AO ROOT DIAM: 3.5 CM
BH CV ECHO MEAS - AO V2 MAX: 95.9 CM/SEC
BH CV ECHO MEAS - AO V2 MEAN: 66.1 CM/SEC
BH CV ECHO MEAS - AO V2 VTI: 16.7 CM
BH CV ECHO MEAS - AVA(I,A): 4 CM^2
BH CV ECHO MEAS - AVA(I,D): 4 CM^2
BH CV ECHO MEAS - AVA(V,A): 3.4 CM^2
BH CV ECHO MEAS - AVA(V,D): 3.4 CM^2
BH CV ECHO MEAS - BSA(HAYCOCK): 2.6 M^2
BH CV ECHO MEAS - BSA: 2.4 M^2
BH CV ECHO MEAS - BZI_BMI: 41 KILOGRAMS/M^2
BH CV ECHO MEAS - BZI_METRIC_HEIGHT: 177.8 CM
BH CV ECHO MEAS - BZI_METRIC_WEIGHT: 129.7 KG
BH CV ECHO MEAS - EDV(CUBED): 3.1 ML
BH CV ECHO MEAS - EDV(MOD-SP4): 112 ML
BH CV ECHO MEAS - EDV(TEICH): 5.6 ML
BH CV ECHO MEAS - EF(CUBED): 24.4 %
BH CV ECHO MEAS - EF(MOD-SP4): 53.8 %
BH CV ECHO MEAS - EF(TEICH): 21.8 %
BH CV ECHO MEAS - ESV(CUBED): 2.4 ML
BH CV ECHO MEAS - ESV(MOD-SP4): 51.7 ML
BH CV ECHO MEAS - ESV(TEICH): 4.4 ML
BH CV ECHO MEAS - FS: 8.9 %
BH CV ECHO MEAS - IVS/LVPW: 1
BH CV ECHO MEAS - IVSD: 2.2 CM
BH CV ECHO MEAS - IVSS: 2.2 CM
BH CV ECHO MEAS - LA DIMENSION: 5.7 CM
BH CV ECHO MEAS - LA/AO: 1.6
BH CV ECHO MEAS - LAD MAJOR: 6.2 CM
BH CV ECHO MEAS - LAT PEAK E' VEL: 9.1 CM/SEC
BH CV ECHO MEAS - LATERAL E/E' RATIO: 8.4
BH CV ECHO MEAS - LV DIASTOLIC VOL/BSA (35-75): 46.1 ML/M^2
BH CV ECHO MEAS - LV MASS(C)D: 157 GRAMS
BH CV ECHO MEAS - LV MASS(C)DI: 64.6 GRAMS/M^2
BH CV ECHO MEAS - LV MASS(C)S: 212.2 GRAMS
BH CV ECHO MEAS - LV MASS(C)SI: 87.3 GRAMS/M^2
BH CV ECHO MEAS - LV MAX PG: 2.9 MMHG
BH CV ECHO MEAS - LV MEAN PG: 2 MMHG
BH CV ECHO MEAS - LV SYSTOLIC VOL/BSA (12-30): 21.3 ML/M^2
BH CV ECHO MEAS - LV V1 MAX: 84.7 CM/SEC
BH CV ECHO MEAS - LV V1 MEAN: 58.2 CM/SEC
BH CV ECHO MEAS - LV V1 VTI: 17.2 CM
BH CV ECHO MEAS - LVIDD: 1.5 CM
BH CV ECHO MEAS - LVIDS: 1.3 CM
BH CV ECHO MEAS - LVLD AP4: 9.2 CM
BH CV ECHO MEAS - LVLS AP4: 7.4 CM
BH CV ECHO MEAS - LVOT AREA (M): 3.8 CM^2
BH CV ECHO MEAS - LVOT AREA: 3.8 CM^2
BH CV ECHO MEAS - LVOT DIAM: 2.2 CM
BH CV ECHO MEAS - LVPWD: 2.1 CM
BH CV ECHO MEAS - LVPWS: 2.8 CM
BH CV ECHO MEAS - MED PEAK E' VEL: 6.1 CM/SEC
BH CV ECHO MEAS - MEDIAL E/E' RATIO: 12.6
BH CV ECHO MEAS - MV A MAX VEL: 99.9 CM/SEC
BH CV ECHO MEAS - MV DEC TIME: 0.18 SEC
BH CV ECHO MEAS - MV E MAX VEL: 76.5 CM/SEC
BH CV ECHO MEAS - MV E/A: 0.77
BH CV ECHO MEAS - MV MAX PG: 4.2 MMHG
BH CV ECHO MEAS - MV MEAN PG: 2 MMHG
BH CV ECHO MEAS - MV V2 MAX: 103 CM/SEC
BH CV ECHO MEAS - MV V2 MEAN: 72.1 CM/SEC
BH CV ECHO MEAS - MV V2 VTI: 18.7 CM
BH CV ECHO MEAS - MVA(VTI): 3.5 CM^2
BH CV ECHO MEAS - PA ACC TIME: 0.06 SEC
BH CV ECHO MEAS - PA MAX PG (FULL): 4.4 MMHG
BH CV ECHO MEAS - PA MAX PG: 6.7 MMHG
BH CV ECHO MEAS - PA MEAN PG (FULL): 3 MMHG
BH CV ECHO MEAS - PA MEAN PG: 4 MMHG
BH CV ECHO MEAS - PA PR(ACCEL): 52 MMHG
BH CV ECHO MEAS - PA V2 MAX: 129 CM/SEC
BH CV ECHO MEAS - PA V2 MEAN: 88.7 CM/SEC
BH CV ECHO MEAS - PA V2 VTI: 19.8 CM
BH CV ECHO MEAS - RV MAX PG: 2.3 MMHG
BH CV ECHO MEAS - RV MEAN PG: 1 MMHG
BH CV ECHO MEAS - RV V1 MAX: 75.4 CM/SEC
BH CV ECHO MEAS - RV V1 MEAN: 52.7 CM/SEC
BH CV ECHO MEAS - RV V1 VTI: 13.7 CM
BH CV ECHO MEAS - SI(AO): 64.2 ML/M^2
BH CV ECHO MEAS - SI(CUBED): 0.31 ML/M^2
BH CV ECHO MEAS - SI(LVOT): 27.2 ML/M^2
BH CV ECHO MEAS - SI(MOD-SP4): 24.8 ML/M^2
BH CV ECHO MEAS - SI(TEICH): 0.51 ML/M^2
BH CV ECHO MEAS - SV(AO): 156.1 ML
BH CV ECHO MEAS - SV(CUBED): 0.76 ML
BH CV ECHO MEAS - SV(LVOT): 66 ML
BH CV ECHO MEAS - SV(MOD-SP4): 60.3 ML
BH CV ECHO MEAS - SV(TEICH): 1.2 ML
BH CV ECHO MEAS - TAPSE (>1.6): 1.6 CM
BH CV ECHO MEASUREMENTS AVERAGE E/E' RATIO: 10.07
BH CV XLRA - TDI S': 10.4 CM/SEC
GLUCOSE BLDC GLUCOMTR-MCNC: 224 MG/DL (ref 70–130)
GLUCOSE BLDC GLUCOMTR-MCNC: 341 MG/DL (ref 70–130)
GLUCOSE BLDC GLUCOMTR-MCNC: 368 MG/DL (ref 70–130)
GLUCOSE BLDC GLUCOMTR-MCNC: 391 MG/DL (ref 70–130)
LEFT ATRIUM VOLUME INDEX: 23 ML/M^2
LEFT ATRIUM VOLUME: 56 ML
MAXIMAL PREDICTED HEART RATE: 161 BPM
STRESS TARGET HR: 137 BPM
TROPONIN T SERPL-MCNC: <0.01 NG/ML (ref 0–0.03)

## 2021-01-27 PROCEDURE — 82962 GLUCOSE BLOOD TEST: CPT

## 2021-01-27 PROCEDURE — 94640 AIRWAY INHALATION TREATMENT: CPT

## 2021-01-27 PROCEDURE — 99214 OFFICE O/P EST MOD 30 MIN: CPT | Performed by: INTERNAL MEDICINE

## 2021-01-27 PROCEDURE — 99225 PR SBSQ OBSERVATION CARE/DAY 25 MINUTES: CPT | Performed by: INTERNAL MEDICINE

## 2021-01-27 PROCEDURE — 93306 TTE W/DOPPLER COMPLETE: CPT

## 2021-01-27 PROCEDURE — 93306 TTE W/DOPPLER COMPLETE: CPT | Performed by: INTERNAL MEDICINE

## 2021-01-27 PROCEDURE — 63710000001 INSULIN DETEMIR PER 5 UNITS: Performed by: INTERNAL MEDICINE

## 2021-01-27 PROCEDURE — 96372 THER/PROPH/DIAG INJ SC/IM: CPT

## 2021-01-27 PROCEDURE — 63710000001 INSULIN ASPART PER 5 UNITS: Performed by: EMERGENCY MEDICINE

## 2021-01-27 PROCEDURE — 25010000002 ENOXAPARIN PER 10 MG: Performed by: EMERGENCY MEDICINE

## 2021-01-27 PROCEDURE — 94799 UNLISTED PULMONARY SVC/PX: CPT

## 2021-01-27 PROCEDURE — 84484 ASSAY OF TROPONIN QUANT: CPT | Performed by: EMERGENCY MEDICINE

## 2021-01-27 PROCEDURE — 94664 DEMO&/EVAL PT USE INHALER: CPT

## 2021-01-27 PROCEDURE — G0378 HOSPITAL OBSERVATION PER HR: HCPCS

## 2021-01-27 RX ORDER — SPIRONOLACTONE 25 MG/1
25 TABLET ORAL DAILY
Qty: 30 TABLET | Refills: 0 | Status: SHIPPED | OUTPATIENT
Start: 2021-01-27 | End: 2021-02-23 | Stop reason: SDUPTHER

## 2021-01-27 RX ORDER — GABAPENTIN 300 MG/1
600 CAPSULE ORAL EVERY 8 HOURS SCHEDULED
Status: DISCONTINUED | OUTPATIENT
Start: 2021-01-27 | End: 2021-01-29 | Stop reason: HOSPADM

## 2021-01-27 RX ORDER — METOPROLOL TARTRATE 50 MG/1
50 TABLET, FILM COATED ORAL 2 TIMES DAILY
Status: DISCONTINUED | OUTPATIENT
Start: 2021-01-27 | End: 2021-01-28

## 2021-01-27 RX ORDER — GABAPENTIN 300 MG/1
300 CAPSULE ORAL ONCE
Status: COMPLETED | OUTPATIENT
Start: 2021-01-27 | End: 2021-01-27

## 2021-01-27 RX ORDER — ISOSORBIDE MONONITRATE 30 MG/1
30 TABLET, EXTENDED RELEASE ORAL
Status: DISCONTINUED | OUTPATIENT
Start: 2021-01-27 | End: 2021-01-29 | Stop reason: HOSPADM

## 2021-01-27 RX ADMIN — INSULIN ASPART 10 UNITS: 100 INJECTION, SOLUTION INTRAVENOUS; SUBCUTANEOUS at 11:43

## 2021-01-27 RX ADMIN — ENOXAPARIN SODIUM 40 MG: 40 INJECTION SUBCUTANEOUS at 21:18

## 2021-01-27 RX ADMIN — ATORVASTATIN CALCIUM 80 MG: 80 TABLET, FILM COATED ORAL at 21:18

## 2021-01-27 RX ADMIN — ENOXAPARIN SODIUM 40 MG: 40 INJECTION SUBCUTANEOUS at 11:43

## 2021-01-27 RX ADMIN — GABAPENTIN 300 MG: 300 CAPSULE ORAL at 15:41

## 2021-01-27 RX ADMIN — METOPROLOL TARTRATE 50 MG: 50 TABLET, FILM COATED ORAL at 08:07

## 2021-01-27 RX ADMIN — GABAPENTIN 300 MG: 300 CAPSULE ORAL at 17:26

## 2021-01-27 RX ADMIN — SODIUM CHLORIDE, PRESERVATIVE FREE 10 ML: 5 INJECTION INTRAVENOUS at 08:08

## 2021-01-27 RX ADMIN — INSULIN DETEMIR 40 UNITS: 100 INJECTION, SOLUTION SUBCUTANEOUS at 21:18

## 2021-01-27 RX ADMIN — CETIRIZINE HYDROCHLORIDE 10 MG: 10 TABLET, FILM COATED ORAL at 08:07

## 2021-01-27 RX ADMIN — BUDESONIDE AND FORMOTEROL FUMARATE DIHYDRATE 2 PUFF: 80; 4.5 AEROSOL RESPIRATORY (INHALATION) at 19:44

## 2021-01-27 RX ADMIN — INSULIN ASPART 5 UNITS: 100 INJECTION, SOLUTION INTRAVENOUS; SUBCUTANEOUS at 06:34

## 2021-01-27 RX ADMIN — ASPIRIN 81 MG: 81 TABLET, COATED ORAL at 08:07

## 2021-01-27 RX ADMIN — GABAPENTIN 600 MG: 300 CAPSULE ORAL at 21:18

## 2021-01-27 RX ADMIN — ISOSORBIDE MONONITRATE 30 MG: 30 TABLET, EXTENDED RELEASE ORAL at 08:07

## 2021-01-27 RX ADMIN — INSULIN ASPART 10 UNITS: 100 INJECTION, SOLUTION INTRAVENOUS; SUBCUTANEOUS at 17:26

## 2021-01-27 RX ADMIN — CLOPIDOGREL BISULFATE 75 MG: 75 TABLET ORAL at 08:07

## 2021-01-27 RX ADMIN — METOPROLOL TARTRATE 50 MG: 50 TABLET, FILM COATED ORAL at 21:18

## 2021-01-27 RX ADMIN — INSULIN ASPART 12 UNITS: 100 INJECTION, SOLUTION INTRAVENOUS; SUBCUTANEOUS at 17:26

## 2021-01-27 RX ADMIN — INSULIN ASPART 10 UNITS: 100 INJECTION, SOLUTION INTRAVENOUS; SUBCUTANEOUS at 08:07

## 2021-01-27 RX ADMIN — AMITRIPTYLINE HYDROCHLORIDE 75 MG: 50 TABLET, FILM COATED ORAL at 21:17

## 2021-01-27 RX ADMIN — GABAPENTIN 300 MG: 300 CAPSULE ORAL at 06:21

## 2021-01-27 RX ADMIN — PANTOPRAZOLE SODIUM 40 MG: 40 TABLET, DELAYED RELEASE ORAL at 06:21

## 2021-01-27 RX ADMIN — BUDESONIDE AND FORMOTEROL FUMARATE DIHYDRATE 2 PUFF: 80; 4.5 AEROSOL RESPIRATORY (INHALATION) at 07:19

## 2021-01-28 ENCOUNTER — APPOINTMENT (OUTPATIENT)
Dept: NUCLEAR MEDICINE | Facility: HOSPITAL | Age: 61
End: 2021-01-28

## 2021-01-28 ENCOUNTER — APPOINTMENT (OUTPATIENT)
Dept: CT IMAGING | Facility: HOSPITAL | Age: 61
End: 2021-01-28

## 2021-01-28 LAB
ALBUMIN SERPL-MCNC: 3.7 G/DL (ref 3.5–5.2)
ALBUMIN/GLOB SERPL: 1.7 G/DL
ALP SERPL-CCNC: 86 U/L (ref 39–117)
ALT SERPL W P-5'-P-CCNC: 58 U/L (ref 1–41)
ANION GAP SERPL CALCULATED.3IONS-SCNC: 9.3 MMOL/L (ref 5–15)
AST SERPL-CCNC: 52 U/L (ref 1–40)
BASOPHILS # BLD AUTO: 0.06 10*3/MM3 (ref 0–0.2)
BASOPHILS NFR BLD AUTO: 1.1 % (ref 0–1.5)
BH CV STRESS COMMENTS STAGE 1: NORMAL
BH CV STRESS DOSE REGADENOSON STAGE 1: 0.4
BH CV STRESS DURATION MIN STAGE 1: 0
BH CV STRESS DURATION SEC STAGE 1: 10
BH CV STRESS PROTOCOL 1: NORMAL
BH CV STRESS RECOVERY BP: NORMAL MMHG
BH CV STRESS RECOVERY HR: 90 BPM
BH CV STRESS STAGE 1: 1
BILIRUB SERPL-MCNC: 0.9 MG/DL (ref 0–1.2)
BUN SERPL-MCNC: 10 MG/DL (ref 6–20)
BUN/CREAT SERPL: 14.7 (ref 7–25)
CALCIUM SPEC-SCNC: 9.1 MG/DL (ref 8.6–10.5)
CHLORIDE SERPL-SCNC: 99 MMOL/L (ref 98–107)
CO2 SERPL-SCNC: 27.7 MMOL/L (ref 22–29)
CREAT SERPL-MCNC: 0.68 MG/DL (ref 0.76–1.27)
DEPRECATED RDW RBC AUTO: 46 FL (ref 37–54)
EOSINOPHIL # BLD AUTO: 0.19 10*3/MM3 (ref 0–0.4)
EOSINOPHIL NFR BLD AUTO: 3.4 % (ref 0.3–6.2)
ERYTHROCYTE [DISTWIDTH] IN BLOOD BY AUTOMATED COUNT: 17.2 % (ref 12.3–15.4)
GFR SERPL CREATININE-BSD FRML MDRD: 119 ML/MIN/1.73
GLOBULIN UR ELPH-MCNC: 2.2 GM/DL
GLUCOSE BLDC GLUCOMTR-MCNC: 264 MG/DL (ref 70–130)
GLUCOSE BLDC GLUCOMTR-MCNC: 361 MG/DL (ref 70–130)
GLUCOSE BLDC GLUCOMTR-MCNC: 388 MG/DL (ref 70–130)
GLUCOSE BLDC GLUCOMTR-MCNC: 402 MG/DL (ref 70–130)
GLUCOSE SERPL-MCNC: 283 MG/DL (ref 65–99)
HCT VFR BLD AUTO: 39.5 % (ref 37.5–51)
HGB BLD-MCNC: 12.8 G/DL (ref 13–17.7)
IMM GRANULOCYTES # BLD AUTO: 0.06 10*3/MM3 (ref 0–0.05)
IMM GRANULOCYTES NFR BLD AUTO: 1.1 % (ref 0–0.5)
LV EF NUC BP: 57 %
LYMPHOCYTES # BLD AUTO: 1.34 10*3/MM3 (ref 0.7–3.1)
LYMPHOCYTES NFR BLD AUTO: 24.3 % (ref 19.6–45.3)
MAXIMAL PREDICTED HEART RATE: 161 BPM
MCH RBC QN AUTO: 24.2 PG (ref 26.6–33)
MCHC RBC AUTO-ENTMCNC: 32.4 G/DL (ref 31.5–35.7)
MCV RBC AUTO: 74.5 FL (ref 79–97)
MICROCYTES BLD QL: NORMAL
MONOCYTES # BLD AUTO: 0.38 10*3/MM3 (ref 0.1–0.9)
MONOCYTES NFR BLD AUTO: 6.9 % (ref 5–12)
NEUTROPHILS NFR BLD AUTO: 3.49 10*3/MM3 (ref 1.7–7)
NEUTROPHILS NFR BLD AUTO: 63.2 % (ref 42.7–76)
NRBC BLD AUTO-RTO: 0 /100 WBC (ref 0–0.2)
PERCENT MAX PREDICTED HR: 59.63 %
PLATELET # BLD AUTO: 163 10*3/MM3 (ref 140–450)
PMV BLD AUTO: 10.2 FL (ref 6–12)
POTASSIUM SERPL-SCNC: 4.1 MMOL/L (ref 3.5–5.2)
PROT SERPL-MCNC: 5.9 G/DL (ref 6–8.5)
RBC # BLD AUTO: 5.3 10*6/MM3 (ref 4.14–5.8)
SMALL PLATELETS BLD QL SMEAR: ADEQUATE
SODIUM SERPL-SCNC: 136 MMOL/L (ref 136–145)
STRESS BASELINE BP: NORMAL MMHG
STRESS BASELINE HR: 82 BPM
STRESS PERCENT HR: 70 %
STRESS POST PEAK BP: NORMAL MMHG
STRESS POST PEAK HR: 96 BPM
STRESS TARGET HR: 137 BPM
WBC # BLD AUTO: 5.52 10*3/MM3 (ref 3.4–10.8)
WBC MORPH BLD: NORMAL

## 2021-01-28 PROCEDURE — 63710000001 INSULIN ASPART PER 5 UNITS: Performed by: EMERGENCY MEDICINE

## 2021-01-28 PROCEDURE — 71275 CT ANGIOGRAPHY CHEST: CPT

## 2021-01-28 PROCEDURE — 85025 COMPLETE CBC W/AUTO DIFF WBC: CPT | Performed by: INTERNAL MEDICINE

## 2021-01-28 PROCEDURE — G0378 HOSPITAL OBSERVATION PER HR: HCPCS

## 2021-01-28 PROCEDURE — 0 TECHNETIUM SESTAMIBI: Performed by: INTERNAL MEDICINE

## 2021-01-28 PROCEDURE — 63710000001 INSULIN ASPART PER 5 UNITS: Performed by: INTERNAL MEDICINE

## 2021-01-28 PROCEDURE — 78452 HT MUSCLE IMAGE SPECT MULT: CPT

## 2021-01-28 PROCEDURE — 96372 THER/PROPH/DIAG INJ SC/IM: CPT

## 2021-01-28 PROCEDURE — A9500 TC99M SESTAMIBI: HCPCS | Performed by: INTERNAL MEDICINE

## 2021-01-28 PROCEDURE — 80053 COMPREHEN METABOLIC PANEL: CPT | Performed by: INTERNAL MEDICINE

## 2021-01-28 PROCEDURE — 78452 HT MUSCLE IMAGE SPECT MULT: CPT | Performed by: INTERNAL MEDICINE

## 2021-01-28 PROCEDURE — 99213 OFFICE O/P EST LOW 20 MIN: CPT | Performed by: INTERNAL MEDICINE

## 2021-01-28 PROCEDURE — 25010000002 IOPAMIDOL 61 % SOLUTION: Performed by: INTERNAL MEDICINE

## 2021-01-28 PROCEDURE — 93017 CV STRESS TEST TRACING ONLY: CPT

## 2021-01-28 PROCEDURE — 25010000002 ENOXAPARIN PER 10 MG: Performed by: EMERGENCY MEDICINE

## 2021-01-28 PROCEDURE — 99217 PR OBSERVATION CARE DISCHARGE MANAGEMENT: CPT | Performed by: INTERNAL MEDICINE

## 2021-01-28 PROCEDURE — 85007 BL SMEAR W/DIFF WBC COUNT: CPT | Performed by: INTERNAL MEDICINE

## 2021-01-28 PROCEDURE — 82962 GLUCOSE BLOOD TEST: CPT

## 2021-01-28 PROCEDURE — 94799 UNLISTED PULMONARY SVC/PX: CPT

## 2021-01-28 PROCEDURE — 25010000002 REGADENOSON 0.4 MG/5ML SOLUTION: Performed by: INTERNAL MEDICINE

## 2021-01-28 PROCEDURE — 63710000001 INSULIN DETEMIR PER 5 UNITS: Performed by: INTERNAL MEDICINE

## 2021-01-28 PROCEDURE — 93018 CV STRESS TEST I&R ONLY: CPT | Performed by: INTERNAL MEDICINE

## 2021-01-28 RX ORDER — AMLODIPINE BESYLATE 5 MG/1
5 TABLET ORAL
Status: DISCONTINUED | OUTPATIENT
Start: 2021-01-28 | End: 2021-01-29 | Stop reason: HOSPADM

## 2021-01-28 RX ORDER — CHLORTHALIDONE 25 MG/1
12.5 TABLET ORAL DAILY
Status: DISCONTINUED | OUTPATIENT
Start: 2021-01-28 | End: 2021-01-29 | Stop reason: HOSPADM

## 2021-01-28 RX ORDER — METOPROLOL SUCCINATE 100 MG/1
100 TABLET, EXTENDED RELEASE ORAL
Status: DISCONTINUED | OUTPATIENT
Start: 2021-01-28 | End: 2021-01-28

## 2021-01-28 RX ORDER — DEXTROSE MONOHYDRATE 25 G/50ML
25 INJECTION, SOLUTION INTRAVENOUS
Status: DISCONTINUED | OUTPATIENT
Start: 2021-01-28 | End: 2021-01-28 | Stop reason: SDUPTHER

## 2021-01-28 RX ORDER — METOPROLOL SUCCINATE 200 MG/1
200 TABLET, EXTENDED RELEASE ORAL EVERY 24 HOURS
Qty: 30 TABLET | Refills: 0 | Status: SHIPPED | OUTPATIENT
Start: 2021-01-28 | End: 2021-03-22 | Stop reason: SDUPTHER

## 2021-01-28 RX ORDER — AMLODIPINE BESYLATE 5 MG/1
5 TABLET ORAL
Qty: 30 TABLET | Refills: 0 | Status: ON HOLD | OUTPATIENT
Start: 2021-01-28 | End: 2021-03-02

## 2021-01-28 RX ORDER — CHLORTHALIDONE 25 MG/1
12.5 TABLET ORAL DAILY
Qty: 15 TABLET | Refills: 0 | Status: ON HOLD | OUTPATIENT
Start: 2021-01-28 | End: 2021-03-02

## 2021-01-28 RX ORDER — NICOTINE POLACRILEX 4 MG
1 LOZENGE BUCCAL
Status: DISCONTINUED | OUTPATIENT
Start: 2021-01-28 | End: 2021-01-28 | Stop reason: SDUPTHER

## 2021-01-28 RX ORDER — METOPROLOL SUCCINATE 100 MG/1
200 TABLET, EXTENDED RELEASE ORAL EVERY 24 HOURS
Status: DISCONTINUED | OUTPATIENT
Start: 2021-01-28 | End: 2021-01-29 | Stop reason: HOSPADM

## 2021-01-28 RX ADMIN — INSULIN ASPART 10 UNITS: 100 INJECTION, SOLUTION INTRAVENOUS; SUBCUTANEOUS at 17:58

## 2021-01-28 RX ADMIN — TECHNETIUM TC 99M SESTAMIBI 1 DOSE: 1 INJECTION INTRAVENOUS at 08:23

## 2021-01-28 RX ADMIN — GABAPENTIN 600 MG: 300 CAPSULE ORAL at 21:09

## 2021-01-28 RX ADMIN — TECHNETIUM TC 99M SESTAMIBI 1 DOSE: 1 INJECTION INTRAVENOUS at 11:03

## 2021-01-28 RX ADMIN — ENOXAPARIN SODIUM 40 MG: 40 INJECTION SUBCUTANEOUS at 21:09

## 2021-01-28 RX ADMIN — PANTOPRAZOLE SODIUM 40 MG: 40 TABLET, DELAYED RELEASE ORAL at 06:34

## 2021-01-28 RX ADMIN — IOPAMIDOL 100 ML: 612 INJECTION, SOLUTION INTRAVENOUS at 18:15

## 2021-01-28 RX ADMIN — GABAPENTIN 600 MG: 300 CAPSULE ORAL at 13:48

## 2021-01-28 RX ADMIN — ENOXAPARIN SODIUM 40 MG: 40 INJECTION SUBCUTANEOUS at 12:54

## 2021-01-28 RX ADMIN — INSULIN ASPART 20 UNITS: 100 INJECTION, SOLUTION INTRAVENOUS; SUBCUTANEOUS at 17:58

## 2021-01-28 RX ADMIN — ASPIRIN 81 MG: 81 TABLET, COATED ORAL at 12:54

## 2021-01-28 RX ADMIN — METOPROLOL SUCCINATE 200 MG: 100 TABLET, EXTENDED RELEASE ORAL at 21:09

## 2021-01-28 RX ADMIN — REGADENOSON 0.4 MG: 0.08 INJECTION, SOLUTION INTRAVENOUS at 11:03

## 2021-01-28 RX ADMIN — CHLORTHALIDONE 12.5 MG: 25 TABLET ORAL at 16:00

## 2021-01-28 RX ADMIN — CLOPIDOGREL BISULFATE 75 MG: 75 TABLET ORAL at 12:53

## 2021-01-28 RX ADMIN — AMITRIPTYLINE HYDROCHLORIDE 75 MG: 50 TABLET, FILM COATED ORAL at 21:09

## 2021-01-28 RX ADMIN — SODIUM CHLORIDE, PRESERVATIVE FREE 10 ML: 5 INJECTION INTRAVENOUS at 12:55

## 2021-01-28 RX ADMIN — BUDESONIDE AND FORMOTEROL FUMARATE DIHYDRATE 2 PUFF: 80; 4.5 AEROSOL RESPIRATORY (INHALATION) at 19:47

## 2021-01-28 RX ADMIN — METOPROLOL TARTRATE 50 MG: 50 TABLET, FILM COATED ORAL at 12:54

## 2021-01-28 RX ADMIN — SODIUM CHLORIDE, PRESERVATIVE FREE 10 ML: 5 INJECTION INTRAVENOUS at 21:10

## 2021-01-28 RX ADMIN — GABAPENTIN 600 MG: 300 CAPSULE ORAL at 06:34

## 2021-01-28 RX ADMIN — ATORVASTATIN CALCIUM 80 MG: 80 TABLET, FILM COATED ORAL at 21:09

## 2021-01-28 RX ADMIN — BUDESONIDE AND FORMOTEROL FUMARATE DIHYDRATE 2 PUFF: 80; 4.5 AEROSOL RESPIRATORY (INHALATION) at 07:06

## 2021-01-28 RX ADMIN — INSULIN ASPART 8 UNITS: 100 INJECTION, SOLUTION INTRAVENOUS; SUBCUTANEOUS at 06:34

## 2021-01-28 RX ADMIN — CETIRIZINE HYDROCHLORIDE 10 MG: 10 TABLET, FILM COATED ORAL at 12:54

## 2021-01-28 RX ADMIN — INSULIN ASPART 10 UNITS: 100 INJECTION, SOLUTION INTRAVENOUS; SUBCUTANEOUS at 13:48

## 2021-01-28 RX ADMIN — INSULIN ASPART 12 UNITS: 100 INJECTION, SOLUTION INTRAVENOUS; SUBCUTANEOUS at 13:49

## 2021-01-28 RX ADMIN — AMLODIPINE BESYLATE 5 MG: 5 TABLET ORAL at 16:00

## 2021-01-28 RX ADMIN — INSULIN DETEMIR 40 UNITS: 100 INJECTION, SOLUTION SUBCUTANEOUS at 21:11

## 2021-01-29 ENCOUNTER — READMISSION MANAGEMENT (OUTPATIENT)
Dept: CALL CENTER | Facility: HOSPITAL | Age: 61
End: 2021-01-29

## 2021-01-29 VITALS
WEIGHT: 285.72 LBS | SYSTOLIC BLOOD PRESSURE: 157 MMHG | OXYGEN SATURATION: 94 % | HEIGHT: 70 IN | RESPIRATION RATE: 18 BRPM | TEMPERATURE: 98.5 F | DIASTOLIC BLOOD PRESSURE: 80 MMHG | HEART RATE: 78 BPM | BODY MASS INDEX: 40.9 KG/M2

## 2021-01-29 LAB
ANION GAP SERPL CALCULATED.3IONS-SCNC: 8.6 MMOL/L (ref 5–15)
BASOPHILS # BLD AUTO: 0.05 10*3/MM3 (ref 0–0.2)
BASOPHILS NFR BLD AUTO: 0.9 % (ref 0–1.5)
BUN SERPL-MCNC: 11 MG/DL (ref 6–20)
BUN/CREAT SERPL: 14.9 (ref 7–25)
CALCIUM SPEC-SCNC: 9.6 MG/DL (ref 8.6–10.5)
CHLORIDE SERPL-SCNC: 98 MMOL/L (ref 98–107)
CO2 SERPL-SCNC: 27.4 MMOL/L (ref 22–29)
CREAT SERPL-MCNC: 0.74 MG/DL (ref 0.76–1.27)
DEPRECATED RDW RBC AUTO: 46.4 FL (ref 37–54)
EOSINOPHIL # BLD AUTO: 0.21 10*3/MM3 (ref 0–0.4)
EOSINOPHIL NFR BLD AUTO: 3.7 % (ref 0.3–6.2)
ERYTHROCYTE [DISTWIDTH] IN BLOOD BY AUTOMATED COUNT: 17.3 % (ref 12.3–15.4)
GFR SERPL CREATININE-BSD FRML MDRD: 108 ML/MIN/1.73
GLUCOSE BLDC GLUCOMTR-MCNC: 391 MG/DL (ref 70–130)
GLUCOSE BLDC GLUCOMTR-MCNC: 395 MG/DL (ref 70–130)
GLUCOSE SERPL-MCNC: 393 MG/DL (ref 65–99)
HCT VFR BLD AUTO: 40 % (ref 37.5–51)
HGB BLD-MCNC: 12.9 G/DL (ref 13–17.7)
IMM GRANULOCYTES # BLD AUTO: 0.07 10*3/MM3 (ref 0–0.05)
IMM GRANULOCYTES NFR BLD AUTO: 1.2 % (ref 0–0.5)
LYMPHOCYTES # BLD AUTO: 1.43 10*3/MM3 (ref 0.7–3.1)
LYMPHOCYTES NFR BLD AUTO: 25.5 % (ref 19.6–45.3)
MCH RBC QN AUTO: 24.3 PG (ref 26.6–33)
MCHC RBC AUTO-ENTMCNC: 32.3 G/DL (ref 31.5–35.7)
MCV RBC AUTO: 75.5 FL (ref 79–97)
MONOCYTES # BLD AUTO: 0.35 10*3/MM3 (ref 0.1–0.9)
MONOCYTES NFR BLD AUTO: 6.2 % (ref 5–12)
NEUTROPHILS NFR BLD AUTO: 3.5 10*3/MM3 (ref 1.7–7)
NEUTROPHILS NFR BLD AUTO: 62.5 % (ref 42.7–76)
NRBC BLD AUTO-RTO: 0 /100 WBC (ref 0–0.2)
PLATELET # BLD AUTO: 179 10*3/MM3 (ref 140–450)
PMV BLD AUTO: 10.3 FL (ref 6–12)
POTASSIUM SERPL-SCNC: 4.2 MMOL/L (ref 3.5–5.2)
RBC # BLD AUTO: 5.3 10*6/MM3 (ref 4.14–5.8)
SODIUM SERPL-SCNC: 134 MMOL/L (ref 136–145)
WBC # BLD AUTO: 5.61 10*3/MM3 (ref 3.4–10.8)

## 2021-01-29 PROCEDURE — 94799 UNLISTED PULMONARY SVC/PX: CPT

## 2021-01-29 PROCEDURE — 63710000001 INSULIN ASPART PER 5 UNITS: Performed by: INTERNAL MEDICINE

## 2021-01-29 PROCEDURE — 94660 CPAP INITIATION&MGMT: CPT

## 2021-01-29 PROCEDURE — 94618 PULMONARY STRESS TESTING: CPT

## 2021-01-29 PROCEDURE — 25010000002 ENOXAPARIN PER 10 MG: Performed by: EMERGENCY MEDICINE

## 2021-01-29 PROCEDURE — 82962 GLUCOSE BLOOD TEST: CPT

## 2021-01-29 PROCEDURE — G0378 HOSPITAL OBSERVATION PER HR: HCPCS

## 2021-01-29 PROCEDURE — 63710000001 INSULIN ASPART PER 5 UNITS: Performed by: EMERGENCY MEDICINE

## 2021-01-29 PROCEDURE — 80048 BASIC METABOLIC PNL TOTAL CA: CPT | Performed by: INTERNAL MEDICINE

## 2021-01-29 PROCEDURE — 85025 COMPLETE CBC W/AUTO DIFF WBC: CPT | Performed by: INTERNAL MEDICINE

## 2021-01-29 PROCEDURE — 96372 THER/PROPH/DIAG INJ SC/IM: CPT

## 2021-01-29 RX ADMIN — GABAPENTIN 600 MG: 300 CAPSULE ORAL at 06:42

## 2021-01-29 RX ADMIN — SODIUM CHLORIDE, PRESERVATIVE FREE 10 ML: 5 INJECTION INTRAVENOUS at 09:07

## 2021-01-29 RX ADMIN — ASPIRIN 81 MG: 81 TABLET, COATED ORAL at 09:04

## 2021-01-29 RX ADMIN — AMLODIPINE BESYLATE 5 MG: 5 TABLET ORAL at 09:04

## 2021-01-29 RX ADMIN — CHLORTHALIDONE 12.5 MG: 25 TABLET ORAL at 09:03

## 2021-01-29 RX ADMIN — CLOPIDOGREL BISULFATE 75 MG: 75 TABLET ORAL at 09:04

## 2021-01-29 RX ADMIN — ISOSORBIDE MONONITRATE 30 MG: 30 TABLET, EXTENDED RELEASE ORAL at 09:04

## 2021-01-29 RX ADMIN — PANTOPRAZOLE SODIUM 40 MG: 40 TABLET, DELAYED RELEASE ORAL at 06:42

## 2021-01-29 RX ADMIN — INSULIN ASPART 20 UNITS: 100 INJECTION, SOLUTION INTRAVENOUS; SUBCUTANEOUS at 06:42

## 2021-01-29 RX ADMIN — BUDESONIDE AND FORMOTEROL FUMARATE DIHYDRATE 2 PUFF: 80; 4.5 AEROSOL RESPIRATORY (INHALATION) at 06:57

## 2021-01-29 RX ADMIN — CETIRIZINE HYDROCHLORIDE 10 MG: 10 TABLET, FILM COATED ORAL at 09:04

## 2021-01-29 RX ADMIN — ENOXAPARIN SODIUM 40 MG: 40 INJECTION SUBCUTANEOUS at 09:18

## 2021-01-29 RX ADMIN — INSULIN ASPART 10 UNITS: 100 INJECTION, SOLUTION INTRAVENOUS; SUBCUTANEOUS at 09:00

## 2021-01-30 NOTE — OUTREACH NOTE
Prep Survey      Responses   [facility] [place] patient discharged from?  [place]   Is LACE score < 7 ?  No   Emergency Room discharge w/ pulse ox?  No   [facility]ibility  [facility]   Date of Admission  01/26/21   Date of Discharge  01/29/21   Discharge Disposition  Home or Self Care   Discharge diagnosis   Chest pain   Does the patient have one of the following disease processes/diagnoses(primary or secondary)?  Other   Does the patient have Home health ordered?  No   Is there a DME ordered?  No   Prep survey completed?  Yes          Edna Clarke RN

## 2021-01-31 ENCOUNTER — TRANSITIONAL CARE MANAGEMENT TELEPHONE ENCOUNTER (OUTPATIENT)
Dept: CALL CENTER | Facility: HOSPITAL | Age: 61
End: 2021-01-31

## 2021-01-31 NOTE — OUTREACH NOTE
"Call Center TCM Note      Responses   [facility] patient discharged from?  [facility]   Does the patient have one of the following disease processes/diagnoses(primary or secondary)?  Other   TCM attempt successful?  Yes   Call start time  1354   Call end time  1358   Discharge diagnosis  Chest pain   Person spoke with today (if not patient) and relationship  Imandra-wife    Meds reviewed with patient/caregiver?  Yes   Is the patient having any side effects they believe may be caused by any medication additions or changes?  No   Does the patient have all medications ordered at discharge?  Yes   Is the patient taking all medications as directed (includes completed medication regime)?  Yes   Does the patient have a primary care provider?   Yes   Does the patient have an appointment with their PCP within 7 days of discharge?  Yes   Comments regarding PCP  Hospital d/c f/u appt is on 2/1/21 at 9:45 am    Has the patient kept scheduled appointments due by today?  N/A   Psychosocial issues?  No   Did the patient receive a copy of their discharge instructions?  Yes   Nursing interventions  Reviewed instructions with patient   What is the patient's perception of their health status since discharge?  Same [Wife reports, \"it depends on the time of day. Sometimes he's better sometimes he's worn out.\"]   Is the patient/caregiver able to teach back signs and symptoms related to disease process for when to call PCP?  Yes   Is the patient/caregiver able to teach back signs and symptoms related to disease process for when to call 911?  Yes   Is the patient/caregiver able to teach back the hierarchy of who to call/visit for symptoms/problems? PCP, Specialist, Home health nurse, Urgent Care, ED, 911  Yes   If the patient is a current smoker, are they able to teach back resources for cessation?  Not a smoker   TCM call completed?  Yes          Gracie Robin RN    1/31/2021, 13:58 EST      "

## 2021-02-01 ENCOUNTER — OFFICE VISIT (OUTPATIENT)
Dept: INTERNAL MEDICINE | Facility: CLINIC | Age: 61
End: 2021-02-01

## 2021-02-01 VITALS
BODY MASS INDEX: 39.37 KG/M2 | DIASTOLIC BLOOD PRESSURE: 75 MMHG | HEIGHT: 70 IN | SYSTOLIC BLOOD PRESSURE: 116 MMHG | WEIGHT: 275 LBS | RESPIRATION RATE: 16 BRPM | OXYGEN SATURATION: 95 % | HEART RATE: 88 BPM | TEMPERATURE: 98.2 F

## 2021-02-01 DIAGNOSIS — R07.89 CHEST PRESSURE: ICD-10-CM

## 2021-02-01 DIAGNOSIS — E78.2 MIXED HYPERLIPIDEMIA: ICD-10-CM

## 2021-02-01 DIAGNOSIS — I10 BENIGN ESSENTIAL HYPERTENSION: Primary | ICD-10-CM

## 2021-02-01 DIAGNOSIS — E11.65 UNCONTROLLED TYPE 2 DIABETES MELLITUS WITH HYPERGLYCEMIA (HCC): ICD-10-CM

## 2021-02-01 PROCEDURE — 99496 TRANSJ CARE MGMT HIGH F2F 7D: CPT | Performed by: INTERNAL MEDICINE

## 2021-02-01 PROCEDURE — 1111F DSCHRG MED/CURRENT MED MERGE: CPT | Performed by: INTERNAL MEDICINE

## 2021-02-01 RX ORDER — AMITRIPTYLINE HYDROCHLORIDE 25 MG/1
75 TABLET, FILM COATED ORAL
Qty: 270 TABLET | Refills: 0 | Status: SHIPPED | OUTPATIENT
Start: 2021-02-01 | End: 2022-01-26

## 2021-02-01 NOTE — PROGRESS NOTES
Transitional Care Follow Up Visit  Subjective     Denzel Harding is a 60 y.o. male who presents for a transitional care management visit.    Within 48 business hours after discharge our office contacted him via telephone to coordinate his care and needs.      I reviewed and discussed the details of that call along with the discharge summary, hospital problems, inpatient lab results, inpatient diagnostic studies, and consultation reports with Denzel.     Current outpatient and discharge medications have been reconciled for the patient.  Reviewed by: Isaura Godoy MD      Date of TCM Phone Call 1/29/2021   [facility]   Date of Admission 1/26/2021   Date of Discharge 1/29/2021   Discharge Disposition Home or Self Care     Risk for Readmission (LACE) Score: 13 (1/29/2021  6:00 AM)    Chief Complaint   Patient presents with   • Follow-up     [facility] admit 1/26/2021 discharge 1/28/2021   • Hypertension   • Hyperlipidemia   • Diabetes   • Chest Pain         History of Present Illness   Course During Hospital Stay:  The patient is also here to follow up on  Hospitalization at [facility]  Admitted   On  1/26/2021  And discharged on 1/29/2021 , his   Hospital er records ,  Lab reports  and Radiology reports have been reviewed  Today and medications reconciled and updated .   Patient is here to follow up on the blood pressure  The patient is taking the blood pressure medications as prescribed and has had no side effects. The patient is also here to follow up on the cholesterol and is trying to follow a diet. The patient is also here to follow on sugar and   lab work done .  He follows up with endocrinology for his sugar, the patient also needs refills on medications .  He continues to have chest pressure in the precordial area, he had a stress test in the hospital  Diabetes   Pertinent negatives for hypoglycemia include no dizziness, nervousness/anxiousness or pallor.  Patient is on acei/arb         The following portions of the patient's history were reviewed and updated as appropriate: allergies, current medications, past family history, past medical history, past social history, past surgical history and problem list.    Review of Systems   Constitutional: Negative for appetite change, fatigue and fever.   HENT: Negative for congestion, ear discharge, ear pain, sinus pressure and sore throat.    Eyes: Negative for pain and discharge.   Respiratory: Negative for cough, shortness of breath and wheezing.    Cardiovascular: Positive for chest pain. Negative for palpitations and leg swelling.   Gastrointestinal: Negative for abdominal pain, constipation, diarrhea, nausea and vomiting.   Endocrine: Negative for cold intolerance and heat intolerance.   Genitourinary: Negative for dysuria and flank pain.   Musculoskeletal: Negative for arthralgias and joint swelling.   Skin: Negative for pallor and rash.   Allergic/Immunologic: Negative for environmental allergies and food allergies.   Neurological: Negative for dizziness, weakness and numbness.   Hematological: Negative for adenopathy. Does not bruise/bleed easily.   Psychiatric/Behavioral: Negative for behavioral problems and dysphoric mood. The patient is not nervous/anxious.        Objective   Physical Exam  Vitals signs and nursing note reviewed.   Constitutional:       General: He is not in acute distress.     Appearance: Normal appearance. He is not diaphoretic.   HENT:      Head: Normocephalic and atraumatic.      Right Ear: External ear normal.      Left Ear: External ear normal.      Nose: Nose normal.   Eyes:      Extraocular Movements: Extraocular movements intact.      Conjunctiva/sclera: Conjunctivae normal.   Neck:      Musculoskeletal: Neck supple.      Trachea: Trachea normal.   Cardiovascular:      Rate and Rhythm: Normal rate and regular rhythm.      Heart sounds: Normal heart sounds.   Pulmonary:      Effort: Pulmonary effort is normal. No  respiratory distress.   Abdominal:      General: Abdomen is flat.   Musculoskeletal:      Comments: Moves all limbs   Skin:     General: Skin is warm and dry.      Findings: No erythema.   Neurological:      Mental Status: He is alert and oriented to person, place, and time.      Comments: No gross motor or sensory deficits         Assessment/Plan   Diagnoses and all orders for this visit:    1. Benign essential hypertension (Primary)    2. Mixed hyperlipidemia  -     CBC & Differential  -     Comprehensive Metabolic Panel  -     Lipid Panel    3. Uncontrolled type 2 diabetes mellitus with hyperglycemia (CMS/[facility])    4. Chest pressure  -     Ambulatory Referral to Cardiology        Plan:  1.  Benign essential hypertension: Will continue current medication, low-sodium diet advised, Counseled to regularly check BP at home with goal averaging <130/80.   2.mixed hyperlipidemia:  Reviewed    fasting CMP and lipid panel.  Diet and exercise counseled,  Will continue current medications  3. Diabetes  Mellitus  : Reviewed   fasting CMP  and hba1c 12.3   , diet and exercise counseled , Will continue current medications - per endocrinology   4. Chest pressure : will refer to cardiology

## 2021-02-01 NOTE — PATIENT INSTRUCTIONS
MyPlate from USDA    MyPlate is an outline of a general healthy diet based on the 2010 Dietary Guidelines for Americans, from the U.S. Department of Agriculture (USDA). It sets guidelines for how much food you should eat from each food group based on your age, sex, and level of physical activity.  What are tips for following MyPlate?  To follow MyPlate recommendations:  · Eat a wide variety of fruits and vegetables, grains, and protein foods.  · Serve smaller portions and eat less food throughout the day.  · Limit portion sizes to avoid overeating.  · Enjoy your food.  · Get at least 150 minutes of exercise every week. This is about 30 minutes each day, 5 or more days per week.  It can be difficult to have every meal look like MyPlate. Think about MyPlate as eating guidelines for an entire day, rather than each individual meal.  Fruits and vegetables  · Make half of your plate fruits and vegetables.  · Eat many different colors of fruits and vegetables each day.  · For a 2,000 calorie daily food plan, eat:  ? 2½ cups of vegetables every day.  ? 2 cups of fruit every day.  · 1 cup is equal to:  ? 1 cup raw or cooked vegetables.  ? 1 cup raw fruit.  ? 1 medium-sized orange, apple, or banana.  ? 1 cup 100% fruit or vegetable juice.  ? 2 cups raw leafy greens, such as lettuce, spinach, or kale.  ? ½ cup dried fruit.  Grains  · One fourth of your plate should be grains.  · Make at least half of the grains you eat each day whole grains.  · For a 2,000 calorie daily food plan, eat 6 oz of grains every day.  · 1 oz is equal to:  ? 1 slice bread.  ? 1 cup cereal.  ? ½ cup cooked rice, cereal, or pasta.  Protein  · One fourth of your plate should be protein.  · Eat a wide variety of protein foods, including meat, poultry, fish, eggs, beans, nuts, and tofu.  · For a 2,000 calorie daily food plan, eat 5½ oz of protein every day.  · 1 oz is equal to:  ? 1 oz meat, poultry, or fish.  ? ¼ cup cooked beans.  ? 1 egg.  ? ½ oz nuts  or seeds.  ? 1 Tbsp peanut butter.  Dairy  · Drink fat-free or low-fat (1%) milk.  · Eat or drink dairy as a side to meals.  · For a 2,000 calorie daily food plan, eat or drink 3 cups of dairy every day.  · 1 cup is equal to:  ? 1 cup milk, yogurt, cottage cheese, or soy milk (soy beverage).  ? 2 oz processed cheese.  ? 1½ oz natural cheese.  Fats, oils, salt, and sugars  · Only small amounts of oils are recommended.  · Avoid foods that are high in calories and low in nutritional value (empty calories), like foods high in fat or added sugars.  · Choose foods that are low in salt (sodium). Choose foods that have less than 140 milligrams (mg) of sodium per serving.  · Drink water instead of sugary drinks. Drink enough water each day to keep your urine pale yellow.  Where to find support  · Work with your health care provider or a nutrition specialist (dietitian) to develop a customized eating plan that is right for you.  · Download an priyank (mobile application) to help you track your daily food intake.  Where to find more information  · Go to ChooseMyPlate.gov for more information.  Summary  · MyPlate is a general guideline for healthy eating from the USDA. It is based on the 2010 Dietary Guidelines for Americans.  · In general, fruits and vegetables should take up ½ of your plate, grains should take up ¼ of your plate, and protein should take up ¼ of your plate.  This information is not intended to replace advice given to you by your health care provider. Make sure you discuss any questions you have with your health care provider.  Document Revised: 05/21/2020 Document Reviewed: 03/19/2018  Elsevier Patient Education © 2020 Elsevier Inc.

## 2021-02-05 ENCOUNTER — READMISSION MANAGEMENT (OUTPATIENT)
Dept: CALL CENTER | Facility: HOSPITAL | Age: 61
End: 2021-02-05

## 2021-02-05 NOTE — OUTREACH NOTE
Medical Week 2 Survey      Responses   [facility] patient discharged from?  [facility]   Does the patient have one of the following disease processes/diagnoses(primary or secondary)?  Other   Week 2 attempt successful?  No   Unsuccessful attempts  Attempt 1          Niecy Lane RN

## 2021-02-08 ENCOUNTER — READMISSION MANAGEMENT (OUTPATIENT)
Dept: CALL CENTER | Facility: HOSPITAL | Age: 61
End: 2021-02-08

## 2021-02-08 NOTE — OUTREACH NOTE
Medical Week 2 Survey      Responses   [facility] patient discharged from?  [facility]   Does the patient have one of the following disease processes/diagnoses(primary or secondary)?  Other   Week 2 attempt successful?  Yes   Call start time  1535   Revoke  Readmitted [Spouse(Carol) states pt. was readmitted to hospital on Saturday. States pt. was having weakness on left side, possible stroke. ]   Call end time  1536          Kaley Morrow RN

## 2021-02-08 NOTE — DISCHARGE SUMMARY
[facility]   DISCHARGE SUMMARY        Name:  Denzel Harding         Age:  60 y.o.  Sex:  male  :  1960  MRN:  [id number]   Visit Number:  [id number]     Admission Date:  2021  Date of Discharge:  2021  Primary Care Physician:  Isaura Godoy MD        Discharge Diagnoses:        Chest pain    Coronary arteriosclerosis in native artery    Diabetic polyneuropathy associated with type 2 diabetes mellitus (CMS/HCC)    Obstructive sleep apnea syndrome    Chronic diastolic CHF (congestive heart failure) (CMS/[facility])    Morbid obesity (CMS/[facility])        Presenting Problem:     Chest pain, unspecified type [R07.9]       Consults:              Consults      Date and Time Order Name Status Description     2021 1043 Cardiology (on-call MD unless specified)         2021 0828 Cardiology (on-call MD unless specified) Completed                   Consulting Physician(s)      Provider Relationship Specialty     Breeding, Dallas HOWE MD Consulting Physician Cardiology             Procedures Performed:              Hospital Course:     60 M history of coronary artery disease s/p CABG , PCI ('nearly 1 year ago')who presented to the ED complaining of chest pain onset earlier this afternoon.  Patient was shopping at [employer] around 3 PM this afternoon, when he experienced midsternal heaviness rated at 6 out of 10, radiating to the bilateral upper extremities, associated with shortness of breath, nausea, and dyspnea.  He denied diaphoresis.  He took some nitroglycerin and made his way to the ER with his wife.  Nitroglycerin helped relieve his symptomatology.  Troponins were negative, however given his high risk, cardiology was contacted, and he was admitted for observation.  Stress test performed  indicated an EF of 57%, normal myocardial perfusion study with no evidence of ischemia.  2D echocardiogram also revealed normal LV diastolic filling pattern.   Recommendations per   Breeding include discontinuation of Lopressor.  Initiation of Toprol 200 mg daily.  We have also initiated chlorthalidone and amlodipine daily for better blood pressure control.  He is already scheduled to see cardiology in their office.  Patient encouraged to keep appointment.  Arrange for him to also follow-up with his primary physician.  Encouraged better adherence to diabetic diet.  CT with PE protocol was negative for pulmonary embolism.  Patient also underwent 6-minute walking oximetry test.  He did not require supplemental oxygen at rest or with activity.     Vital Signs:     Temp:  [98.4 °F (36.9 °C)-98.9 °F (37.2 °C)] 98.6 °F (37 °C)  Heart Rate:  [] 101  Resp:  [18] 18  BP: (141-170)/(80-90) 170/90     Physical Exam:     General Appearance:  Alert and cooperative, not in any acute distress.   Head:  Atraumatic and normocephalic, without obvious abnormality.   Eyes:          PERRLA, conjunctivae and sclerae normal, no icterus. No pallor. Extraocular movements are within normal limits.   Ears:  Ears appear intact with no abnormalities noted.   Throat: No oral lesions, no thrush, oral mucosa moist.               Lungs:   Chest shape is normal. Breath sounds heard bilaterally equally.  No crackles or wheezing. No pleural rub or bronchial breathing.   Heart:  Normal S1 and S2, no murmur, No JVD.   Abdomen:   Normal bowel sounds, no masses, no organomegaly. Soft, nontender, nondistended, no guarding, no rebound tenderness.   Extremities: Moves all extremities well, no edema, no cyanosis, no clubbing.   Pulses: Pulses palpable and equal bilaterally.   Skin: No bleeding, bruising or rash.         Neurologic: Alert and oriented x 3.       Pertinent Lab Results:            Results from last 7 days   Lab Units 01/28/21  0922 01/26/21  1615   SODIUM mmol/L 136 136   POTASSIUM mmol/L 4.1 4.1   CHLORIDE mmol/L 99 96*   CO2 mmol/L 27.7 29.0   BUN mg/dL 10 11   CREATININE mg/dL 0.68* 0.94   CALCIUM mg/dL 9.1 9.4    BILIRUBIN mg/dL 0.9 0.7   ALK PHOS U/L 86 100   ALT (SGPT) U/L 58* 61*   AST (SGOT) U/L 52* 48*   GLUCOSE mg/dL 283* 328*            Results from last 7 days   Lab Units 01/28/21  0922 01/26/21  1615   WBC 10*3/mm3 5.52 8.26   HEMOGLOBIN g/dL 12.8* 14.9   HEMATOCRIT % 39.5 45.9   PLATELETS 10*3/mm3 163 240                 Results from last 7 days   Lab Units 01/27/21  0218 01/26/21  1912 01/26/21  1615   TROPONIN T ng/mL <0.010 <0.010 <0.010           Results from last 7 days   Lab Units 01/26/21  1615   PROBNP pg/mL 24.1                         Invalid input(s): USDES,  BLOODU, NITRITITE, BACT, EP          Pain Management Panel         Pain Management Panel Latest Ref Rng & Units 10/15/2019 9/14/2019     AMPHETAMINES SCREEN, URINE Negative Negative Negative     BARBITURATES SCREEN Negative Negative Negative     BENZODIAZEPINE SCREEN, URINE Negative Negative Negative     BUPRENORPHINEUR Negative Negative Negative     COCAINE SCREEN, URINE Negative Negative Negative     METHADONE SCREEN, URINE Negative Negative Negative     METHAMPHETAMINEUR Negative Negative Negative                   Pertinent Radiology Results:              Imaging Results (All)      Procedure Component Value Units Date/Time     XR Chest 1 View [[id number]] Collected: 01/27/21 0751       Updated: 01/27/21 0754     Narrative:       PROCEDURE: XR CHEST 1 VW-     HISTORY: cp, sbo     COMPARISON: 11/15/2020     FINDINGS: No acute pulmonary opacity is present. There is no evidence of  effusion or pneumothorax.        There is evidence of prior median sternotomy, presumably from CABG.   Otherwise, mediastinum is unremarkable.        Heart size is normal.        Impression:       Unremarkable chest, status post CABG.           This report was finalized on 1/27/2021 7:52 AM by Phoenix Bella MD.             Echo:          Results for orders placed during the hospital encounter of 01/26/21   Adult Transthoracic Echo Complete W/ Cont if Necessary Per  Protocol     Narrative 1.  Technically difficult study.  2.  Normal left ventricular size and systolic function, LVEF grossly   55-60%.  3.  Normal LV diastolic filling pattern.  4.  Normal right ventricular size and systolic function.  5.  Severely increased left atrial volume index.  6.  Trace mitral regurgitation.         Condition on Discharge:       Stable.     Code status during the hospital stay:         Code Status and Medical Interventions:   Ordered at: 01/26/21 2038     Code Status:     CPR     Medical Interventions (Level of Support Prior to Arrest):     Full         Discharge Disposition:     Home or Self Care     Discharge Medications:              Discharge Medications            New Medications      Instructions Start Date   amLODIPine 5 MG tablet  Commonly known as: NORVASC    5 mg, Oral, Every 24 Hours Scheduled        chlorthalidone 25 MG tablet  Commonly known as: HYGROTON    12.5 mg, Oral, Daily        metoprolol succinate  MG 24 hr tablet  Commonly known as: TOPROL-XL    200 mg, Oral, Every 24 Hours                 Continue These Medications      Instructions Start Date   albuterol sulfate  (90 Base) MCG/ACT inhaler  Commonly known as: Ventolin HFA    2 puffs, Inhalation, Every 4 Hours PRN        amitriptyline 25 MG tablet  Commonly known as: ELAVIL    75 mg, Oral, Every Night at Bedtime        Asmanex  MCG/ACT aerosol  Generic drug: Mometasone Furoate    1 puff, Inhalation, 2 Times Daily        aspirin 81 MG EC tablet    81 mg, Oral, Daily        atorvastatin 80 MG tablet  Commonly known as: LIPITOR    80 mg, Oral, Nightly        budesonide-formoterol 80-4.5 MCG/ACT inhaler  Commonly known as: Symbicort    2 puffs, Inhalation, 2 Times Daily - RT, Rinse mouth with water after use.        cloNIDine 0.1 MG tablet  Commonly known as: Catapres    Take 1 tablet by mouth every 6 hours as needed for SBP > 170 or DBP > 100        clopidogrel 75 MG tablet  Commonly known as:  PLAVIX    75 mg, Oral, Daily        coenzyme Q10 100 MG capsule    100 mg, Oral, Daily        Cycloset 0.8 MG tablet  Generic drug: Bromocriptine Mesylate    3.2 mg, Oral, Every Morning        dicyclomine 10 MG/5ML syrup  Commonly known as: BENTYL    20 mg, Oral, 3 Times Daily PRN        docusate sodium 100 MG capsule  Commonly known as: Colace    100 mg, Oral, 2 Times Daily PRN        Dupixent 300 MG/2ML solution prefilled syringe  Generic drug: Dupilumab    INJECT 300 MG SUBCUTANEOUSLY EVERY OTHER WEEK        gabapentin 600 MG tablet  Commonly known as: NEURONTIN    600 mg, Oral, 3 Times Daily        HumaLOG KwikPen 100 UNIT/ML solution pen-injector  Generic drug: Insulin Lispro (1 Unit Dial)    Inject 50 units tid before meals        ipratropium-albuterol 0.5-2.5 mg/3 ml nebulizer  Commonly known as: DUO-NEB    3 mL, Nebulization, 4 Times Daily PRN        levocetirizine 5 MG tablet  Commonly known as: XYZAL    5 mg, Oral, Every Evening        lisinopril 5 MG tablet  Commonly known as: PRINIVIL,ZESTRIL    5 mg, Oral, Daily        meclizine 25 MG tablet  Commonly known as: ANTIVERT    25 mg, Oral, 3 Times Daily PRN        omeprazole 20 MG capsule  Commonly known as: priLOSEC    20 mg, Oral, Daily        Ozempic (1 MG/DOSE) 2 MG/1.5ML solution pen-injector  Generic drug: Semaglutide (1 MG/DOSE)    1 mg, Subcutaneous, Weekly        promethazine 25 MG tablet  Commonly known as: PHENERGAN    25 mg, Oral, Every 6 Hours PRN        Spiriva Respimat 1.25 MCG/ACT aerosol solution inhaler  Generic drug: Tiotropium Bromide Monohydrate    2 puffs, Inhalation, Daily        spironolactone 25 MG tablet  Commonly known as: ALDACTONE    25 mg, Oral, Daily        tiZANidine 4 MG tablet  Commonly known as: ZANAFLEX    TAKE 1 CAPSULE BY MOUTH AT NIGHT AS NEEDED FOR MUSCLE SPASMS        Toujeo SoloStar 300 UNIT/ML solution pen-injector injection  Generic drug: Insulin Glargine (1 Unit Dial)    125 Units, Subcutaneous, 2 times daily,  One month supply        TURMERIC PO    500 mg, Oral, 2 Times Daily        vitamin C 500 MG tablet  Commonly known as: ASCORBIC ACID    500 mg, Oral, Daily        vitamin E 400 UNIT capsule    400 Units, Oral, 2 times daily        Vortioxetine HBr 20 MG tablet  Commonly known as: Trintellix    20 mg, Oral, Daily        zafirlukast 20 MG tablet  Commonly known as: Accolate    20 mg, Oral, 2 Times Daily            Stop These Medications    metoprolol tartrate 25 MG tablet  Commonly known as: LOPRESSOR                Discharge Diet:         Activity at Discharge:         Follow-up Appointments:          Additional Instructions for the Follow-ups that You Need to Schedule      Discharge Follow-up with PCP   As directed         Currently Documented PCP:    Isaura Godoy MD    PCP Phone Number:    [phone]      Follow Up Details: 1-2 weeks                     Follow-up Information      Isaura Godoy MD .    Specialties: Internal Medicine, Geriatric Medicine  Why: 1-2 weeks  Contact information:  [address]  [facility] [address]  [phone]                                Future Appointments   Date Time Provider Department Center   2/1/2021 10:00 AM Isaura Godoy MD [facility]   3/4/2021  2:00 PM Isaura Godoy MD [facility]   3/24/2021 11:00 AM Linda Barajas MD [facility] None   3/25/2021 11:00 AM Blaine Waters MD [facility] None   7/29/2021  1:45 PM Petra Crowell MD [facility] None              Additional Instructions for the Follow-ups that You Need to Schedule      Discharge Follow-up with PCP   As directed         Currently Documented PCP:    Isaura Godoy MD    PCP Phone Number:    [phone]      Follow Up Details: 1-2 weeks                   Test Results Pending at Discharge:          Pending Labs      Order Current Status     Green Top (No Gel) In process     [place] Draw In process                Surinder Miguel DO  01/28/21  15:17 EST     Time  spent: Time: I spent  >30  minutes on this discharge activity which included: face-to-face encounter with the patient, reviewing the data in the system, coordination of the care with the nursing staff as well as consultants, documentation, and entering orders.

## 2021-02-23 DIAGNOSIS — I10 BENIGN ESSENTIAL HYPERTENSION: ICD-10-CM

## 2021-02-23 DIAGNOSIS — E11.42 DIABETIC POLYNEUROPATHY ASSOCIATED WITH TYPE 2 DIABETES MELLITUS (HCC): ICD-10-CM

## 2021-02-23 DIAGNOSIS — E11.65 UNCONTROLLED TYPE 2 DIABETES MELLITUS WITH HYPERGLYCEMIA (HCC): ICD-10-CM

## 2021-02-23 DIAGNOSIS — K21.00 GASTROESOPHAGEAL REFLUX DISEASE WITH ESOPHAGITIS WITHOUT HEMORRHAGE: ICD-10-CM

## 2021-02-23 DIAGNOSIS — E78.2 MIXED HYPERLIPIDEMIA: ICD-10-CM

## 2021-02-23 RX ORDER — GABAPENTIN 600 MG/1
600 TABLET ORAL 3 TIMES DAILY
Qty: 270 TABLET | Refills: 3 | Status: SHIPPED | OUTPATIENT
Start: 2021-02-23

## 2021-02-23 RX ORDER — ZAFIRLUKAST 20 MG/1
20 TABLET, FILM COATED ORAL 2 TIMES DAILY
Qty: 60 TABLET | Refills: 5 | Status: SHIPPED | OUTPATIENT
Start: 2021-02-23 | End: 2021-07-26 | Stop reason: SDUPTHER

## 2021-02-23 RX ORDER — VITAMIN E 268 MG
400 CAPSULE ORAL 2 TIMES DAILY
Qty: 60 CAPSULE | Refills: 5 | Status: SHIPPED | OUTPATIENT
Start: 2021-02-23

## 2021-02-23 RX ORDER — INSULIN LISPRO 100 [IU]/ML
INJECTION, SOLUTION INTRAVENOUS; SUBCUTANEOUS
Qty: 135 ML | Refills: 1
Start: 2021-02-23 | End: 2021-06-14

## 2021-02-24 RX ORDER — CLOPIDOGREL BISULFATE 75 MG/1
75 TABLET ORAL DAILY
Qty: 90 TABLET | Refills: 0 | Status: SHIPPED | OUTPATIENT
Start: 2021-02-24 | End: 2021-06-14 | Stop reason: SDUPTHER

## 2021-02-24 RX ORDER — LISINOPRIL 5 MG/1
5 TABLET ORAL DAILY
Qty: 90 TABLET | Refills: 0 | Status: SHIPPED | OUTPATIENT
Start: 2021-02-24 | End: 2021-06-14 | Stop reason: SDUPTHER

## 2021-02-25 RX ORDER — OMEPRAZOLE 20 MG/1
20 CAPSULE, DELAYED RELEASE ORAL DAILY
Qty: 90 CAPSULE | Refills: 0 | Status: SHIPPED | OUTPATIENT
Start: 2021-02-25 | End: 2021-06-14 | Stop reason: SDUPTHER

## 2021-02-25 RX ORDER — ATORVASTATIN CALCIUM 80 MG/1
80 TABLET, FILM COATED ORAL NIGHTLY
Qty: 90 TABLET | Refills: 0 | Status: SHIPPED | OUTPATIENT
Start: 2021-02-25 | End: 2021-06-14 | Stop reason: SDUPTHER

## 2021-02-25 RX ORDER — SPIRONOLACTONE 25 MG/1
25 TABLET ORAL DAILY
Qty: 90 TABLET | Refills: 0 | Status: ON HOLD | OUTPATIENT
Start: 2021-02-25 | End: 2021-03-01

## 2021-02-25 RX ORDER — VORTIOXETINE 20 MG/1
20 TABLET, FILM COATED ORAL DAILY
Qty: 90 TABLET | Refills: 0 | Status: SHIPPED | OUTPATIENT
Start: 2021-02-25 | End: 2022-01-01

## 2021-02-26 DIAGNOSIS — I10 BENIGN ESSENTIAL HYPERTENSION: ICD-10-CM

## 2021-02-27 ENCOUNTER — APPOINTMENT (OUTPATIENT)
Dept: CT IMAGING | Facility: HOSPITAL | Age: 61
End: 2021-02-27

## 2021-02-27 ENCOUNTER — HOSPITAL ENCOUNTER (OUTPATIENT)
Facility: HOSPITAL | Age: 61
Setting detail: OBSERVATION
Discharge: HOME | End: 2021-03-02
Attending: INTERNAL MEDICINE | Admitting: INTERNAL MEDICINE

## 2021-02-27 DIAGNOSIS — R47.81 SLURRED SPEECH: ICD-10-CM

## 2021-02-27 DIAGNOSIS — I63.9 CEREBROVASCULAR ACCIDENT (CVA), UNSPECIFIED MECHANISM (HCC): ICD-10-CM

## 2021-02-27 DIAGNOSIS — R20.0 RIGHT SIDED NUMBNESS: Primary | ICD-10-CM

## 2021-02-27 PROBLEM — Z79.899 POLYPHARMACY: Status: ACTIVE | Noted: 2021-02-27

## 2021-02-27 LAB
ALBUMIN SERPL-MCNC: 4.1 G/DL (ref 3.5–5.2)
ALBUMIN/GLOB SERPL: 1.6 G/DL
ALP SERPL-CCNC: 100 U/L (ref 39–117)
ALT SERPL W P-5'-P-CCNC: 48 U/L (ref 1–41)
ANION GAP SERPL CALCULATED.3IONS-SCNC: 9.8 MMOL/L (ref 5–15)
APTT PPP: 32.7 SECONDS (ref 24.5–37.2)
AST SERPL-CCNC: 29 U/L (ref 1–40)
BASOPHILS # BLD AUTO: 0.06 10*3/MM3 (ref 0–0.2)
BASOPHILS NFR BLD AUTO: 0.7 % (ref 0–1.5)
BILIRUB SERPL-MCNC: 0.7 MG/DL (ref 0–1.2)
BUN SERPL-MCNC: 26 MG/DL (ref 6–20)
BUN/CREAT SERPL: 24.1 (ref 7–25)
CALCIUM SPEC-SCNC: 9.5 MG/DL (ref 8.6–10.5)
CHLORIDE SERPL-SCNC: 94 MMOL/L (ref 98–107)
CHOLEST SERPL-MCNC: 107 MG/DL (ref 0–200)
CO2 SERPL-SCNC: 27.2 MMOL/L (ref 22–29)
CREAT SERPL-MCNC: 1.08 MG/DL (ref 0.76–1.27)
DEPRECATED RDW RBC AUTO: 44.1 FL (ref 37–54)
EOSINOPHIL # BLD AUTO: 0.36 10*3/MM3 (ref 0–0.4)
EOSINOPHIL NFR BLD AUTO: 4.3 % (ref 0.3–6.2)
ERYTHROCYTE [DISTWIDTH] IN BLOOD BY AUTOMATED COUNT: 16.5 % (ref 12.3–15.4)
GFR SERPL CREATININE-BSD FRML MDRD: 70 ML/MIN/1.73
GLOBULIN UR ELPH-MCNC: 2.6 GM/DL
GLUCOSE BLDC GLUCOMTR-MCNC: 395 MG/DL (ref 70–130)
GLUCOSE SERPL-MCNC: 423 MG/DL (ref 65–99)
HCT VFR BLD AUTO: 40 % (ref 37.5–51)
HDLC SERPL-MCNC: 27 MG/DL (ref 40–60)
HGB BLD-MCNC: 13.4 G/DL (ref 13–17.7)
HOLD SPECIMEN: NORMAL
IMM GRANULOCYTES # BLD AUTO: 0.05 10*3/MM3 (ref 0–0.05)
IMM GRANULOCYTES NFR BLD AUTO: 0.6 % (ref 0–0.5)
INR PPP: 1.05 (ref 0.9–1.1)
LDLC SERPL CALC-MCNC: 50 MG/DL (ref 0–100)
LDLC/HDLC SERPL: 1.61 {RATIO}
LYMPHOCYTES # BLD AUTO: 1.97 10*3/MM3 (ref 0.7–3.1)
LYMPHOCYTES NFR BLD AUTO: 23.5 % (ref 19.6–45.3)
MCH RBC QN AUTO: 25.3 PG (ref 26.6–33)
MCHC RBC AUTO-ENTMCNC: 33.5 G/DL (ref 31.5–35.7)
MCV RBC AUTO: 75.5 FL (ref 79–97)
MONOCYTES # BLD AUTO: 0.42 10*3/MM3 (ref 0.1–0.9)
MONOCYTES NFR BLD AUTO: 5 % (ref 5–12)
NEUTROPHILS NFR BLD AUTO: 5.52 10*3/MM3 (ref 1.7–7)
NEUTROPHILS NFR BLD AUTO: 65.9 % (ref 42.7–76)
NRBC BLD AUTO-RTO: 0 /100 WBC (ref 0–0.2)
PLATELET # BLD AUTO: 182 10*3/MM3 (ref 140–450)
PMV BLD AUTO: 10.2 FL (ref 6–12)
POTASSIUM SERPL-SCNC: 4.3 MMOL/L (ref 3.5–5.2)
PROT SERPL-MCNC: 6.7 G/DL (ref 6–8.5)
PROTHROMBIN TIME: 14.1 SECONDS (ref 12–15.1)
RBC # BLD AUTO: 5.3 10*6/MM3 (ref 4.14–5.8)
SARS-COV-2 RNA PNL SPEC NAA+PROBE: NOT DETECTED
SODIUM SERPL-SCNC: 131 MMOL/L (ref 136–145)
TRIGL SERPL-MCNC: 182 MG/DL (ref 0–150)
TROPONIN T SERPL-MCNC: 0.01 NG/ML (ref 0–0.03)
VLDLC SERPL-MCNC: 30 MG/DL (ref 5–40)
WBC # BLD AUTO: 8.38 10*3/MM3 (ref 3.4–10.8)
WHOLE BLOOD HOLD SPECIMEN: NORMAL
WHOLE BLOOD HOLD SPECIMEN: NORMAL

## 2021-02-27 PROCEDURE — 99220 PR INITIAL OBSERVATION CARE/DAY 70 MINUTES: CPT | Performed by: EMERGENCY MEDICINE

## 2021-02-27 PROCEDURE — G0378 HOSPITAL OBSERVATION PER HR: HCPCS

## 2021-02-27 PROCEDURE — 70498 CT ANGIOGRAPHY NECK: CPT

## 2021-02-27 PROCEDURE — 82962 GLUCOSE BLOOD TEST: CPT

## 2021-02-27 PROCEDURE — 80061 LIPID PANEL: CPT | Performed by: EMERGENCY MEDICINE

## 2021-02-27 PROCEDURE — 70450 CT HEAD/BRAIN W/O DYE: CPT

## 2021-02-27 PROCEDURE — C9803 HOPD COVID-19 SPEC COLLECT: HCPCS

## 2021-02-27 PROCEDURE — 85025 COMPLETE CBC W/AUTO DIFF WBC: CPT | Performed by: EMERGENCY MEDICINE

## 2021-02-27 PROCEDURE — 99215 OFFICE O/P EST HI 40 MIN: CPT | Performed by: PSYCHIATRY & NEUROLOGY

## 2021-02-27 PROCEDURE — 93005 ELECTROCARDIOGRAM TRACING: CPT | Performed by: EMERGENCY MEDICINE

## 2021-02-27 PROCEDURE — 25010000002 IOPAMIDOL 61 % SOLUTION: Performed by: EMERGENCY MEDICINE

## 2021-02-27 PROCEDURE — 85610 PROTHROMBIN TIME: CPT | Performed by: EMERGENCY MEDICINE

## 2021-02-27 PROCEDURE — 87635 SARS-COV-2 COVID-19 AMP PRB: CPT | Performed by: EMERGENCY MEDICINE

## 2021-02-27 PROCEDURE — 80053 COMPREHEN METABOLIC PANEL: CPT | Performed by: EMERGENCY MEDICINE

## 2021-02-27 PROCEDURE — 84484 ASSAY OF TROPONIN QUANT: CPT | Performed by: EMERGENCY MEDICINE

## 2021-02-27 PROCEDURE — 85730 THROMBOPLASTIN TIME PARTIAL: CPT | Performed by: EMERGENCY MEDICINE

## 2021-02-27 PROCEDURE — 70496 CT ANGIOGRAPHY HEAD: CPT

## 2021-02-27 PROCEDURE — 96372 THER/PROPH/DIAG INJ SC/IM: CPT

## 2021-02-27 PROCEDURE — 63710000001 INSULIN DETEMIR PER 5 UNITS: Performed by: EMERGENCY MEDICINE

## 2021-02-27 PROCEDURE — 25010000002 ENOXAPARIN PER 10 MG: Performed by: EMERGENCY MEDICINE

## 2021-02-27 PROCEDURE — 63710000001 INSULIN ASPART PER 5 UNITS: Performed by: EMERGENCY MEDICINE

## 2021-02-27 PROCEDURE — 99284 EMERGENCY DEPT VISIT MOD MDM: CPT

## 2021-02-27 RX ORDER — TIZANIDINE 4 MG/1
4 TABLET ORAL EVERY 12 HOURS PRN
Status: DISCONTINUED | OUTPATIENT
Start: 2021-02-27 | End: 2021-03-02 | Stop reason: HOSPADM

## 2021-02-27 RX ORDER — SODIUM CHLORIDE 0.9 % (FLUSH) 0.9 %
10 SYRINGE (ML) INJECTION AS NEEDED
Status: DISCONTINUED | OUTPATIENT
Start: 2021-02-27 | End: 2021-03-02 | Stop reason: HOSPADM

## 2021-02-27 RX ORDER — AMLODIPINE BESYLATE 5 MG/1
5 TABLET ORAL
Status: COMPLETED | OUTPATIENT
Start: 2021-02-28 | End: 2021-02-28

## 2021-02-27 RX ORDER — CHLORTHALIDONE 25 MG/1
12.5 TABLET ORAL DAILY
Status: DISCONTINUED | OUTPATIENT
Start: 2021-02-28 | End: 2021-03-02 | Stop reason: HOSPADM

## 2021-02-27 RX ORDER — BUDESONIDE AND FORMOTEROL FUMARATE DIHYDRATE 80; 4.5 UG/1; UG/1
2 AEROSOL RESPIRATORY (INHALATION)
Status: DISCONTINUED | OUTPATIENT
Start: 2021-02-27 | End: 2021-03-02 | Stop reason: HOSPADM

## 2021-02-27 RX ORDER — SODIUM CHLORIDE 0.9 % (FLUSH) 0.9 %
10 SYRINGE (ML) INJECTION EVERY 12 HOURS SCHEDULED
Status: DISCONTINUED | OUTPATIENT
Start: 2021-02-27 | End: 2021-03-02 | Stop reason: HOSPADM

## 2021-02-27 RX ORDER — METOPROLOL SUCCINATE 100 MG/1
200 TABLET, EXTENDED RELEASE ORAL EVERY 24 HOURS
Status: DISCONTINUED | OUTPATIENT
Start: 2021-02-27 | End: 2021-03-02 | Stop reason: HOSPADM

## 2021-02-27 RX ORDER — DEXTROSE MONOHYDRATE 25 G/50ML
25 INJECTION, SOLUTION INTRAVENOUS
Status: DISCONTINUED | OUTPATIENT
Start: 2021-02-27 | End: 2021-03-02 | Stop reason: HOSPADM

## 2021-02-27 RX ORDER — ASPIRIN 325 MG
325 TABLET, DELAYED RELEASE (ENTERIC COATED) ORAL DAILY
Status: DISCONTINUED | OUTPATIENT
Start: 2021-02-28 | End: 2021-03-02 | Stop reason: HOSPADM

## 2021-02-27 RX ORDER — IPRATROPIUM BROMIDE AND ALBUTEROL SULFATE 2.5; .5 MG/3ML; MG/3ML
3 SOLUTION RESPIRATORY (INHALATION) EVERY 4 HOURS PRN
Status: DISCONTINUED | OUTPATIENT
Start: 2021-02-27 | End: 2021-03-02 | Stop reason: HOSPADM

## 2021-02-27 RX ORDER — LISINOPRIL 5 MG/1
5 TABLET ORAL DAILY
Status: DISCONTINUED | OUTPATIENT
Start: 2021-02-28 | End: 2021-03-02 | Stop reason: HOSPADM

## 2021-02-27 RX ORDER — NICOTINE POLACRILEX 4 MG
1 LOZENGE BUCCAL
Status: DISCONTINUED | OUTPATIENT
Start: 2021-02-27 | End: 2021-03-02 | Stop reason: HOSPADM

## 2021-02-27 RX ORDER — GABAPENTIN 300 MG/1
300 CAPSULE ORAL EVERY 8 HOURS SCHEDULED
Status: DISCONTINUED | OUTPATIENT
Start: 2021-02-27 | End: 2021-03-02 | Stop reason: HOSPADM

## 2021-02-27 RX ORDER — DICYCLOMINE HYDROCHLORIDE 10 MG/1
10 CAPSULE ORAL 3 TIMES DAILY PRN
Status: DISCONTINUED | OUTPATIENT
Start: 2021-02-27 | End: 2021-03-02 | Stop reason: HOSPADM

## 2021-02-27 RX ORDER — SPIRONOLACTONE 25 MG/1
25 TABLET ORAL DAILY
Status: DISCONTINUED | OUTPATIENT
Start: 2021-02-28 | End: 2021-03-02 | Stop reason: HOSPADM

## 2021-02-27 RX ORDER — ATORVASTATIN CALCIUM 80 MG/1
80 TABLET, FILM COATED ORAL NIGHTLY
Status: DISCONTINUED | OUTPATIENT
Start: 2021-02-27 | End: 2021-03-02 | Stop reason: HOSPADM

## 2021-02-27 RX ORDER — PANTOPRAZOLE SODIUM 40 MG/1
40 TABLET, DELAYED RELEASE ORAL EVERY MORNING
Status: DISCONTINUED | OUTPATIENT
Start: 2021-02-28 | End: 2021-03-02 | Stop reason: HOSPADM

## 2021-02-27 RX ORDER — CLOPIDOGREL BISULFATE 75 MG/1
75 TABLET ORAL DAILY
Status: DISCONTINUED | OUTPATIENT
Start: 2021-02-28 | End: 2021-03-02 | Stop reason: HOSPADM

## 2021-02-27 RX ADMIN — METOPROLOL SUCCINATE 200 MG: 100 TABLET, EXTENDED RELEASE ORAL at 23:37

## 2021-02-27 RX ADMIN — INSULIN ASPART 10 UNITS: 100 INJECTION, SOLUTION INTRAVENOUS; SUBCUTANEOUS at 23:38

## 2021-02-27 RX ADMIN — IOPAMIDOL 100 ML: 612 INJECTION, SOLUTION INTRAVENOUS at 17:53

## 2021-02-27 RX ADMIN — ENOXAPARIN SODIUM 40 MG: 40 INJECTION SUBCUTANEOUS at 23:37

## 2021-02-27 RX ADMIN — GABAPENTIN 300 MG: 300 CAPSULE ORAL at 23:37

## 2021-02-27 RX ADMIN — SODIUM CHLORIDE, PRESERVATIVE FREE 10 ML: 5 INJECTION INTRAVENOUS at 23:00

## 2021-02-27 RX ADMIN — INSULIN DETEMIR 15 UNITS: 100 INJECTION, SOLUTION SUBCUTANEOUS at 23:38

## 2021-02-27 RX ADMIN — ATORVASTATIN CALCIUM 80 MG: 80 TABLET, FILM COATED ORAL at 23:37

## 2021-02-28 LAB
GLUCOSE BLDC GLUCOMTR-MCNC: 305 MG/DL (ref 70–130)
GLUCOSE BLDC GLUCOMTR-MCNC: 308 MG/DL (ref 70–130)
GLUCOSE BLDC GLUCOMTR-MCNC: 341 MG/DL (ref 70–130)
GLUCOSE BLDC GLUCOMTR-MCNC: 349 MG/DL (ref 70–130)

## 2021-02-28 PROCEDURE — 94799 UNLISTED PULMONARY SVC/PX: CPT

## 2021-02-28 PROCEDURE — 82962 GLUCOSE BLOOD TEST: CPT

## 2021-02-28 PROCEDURE — 63710000001 INSULIN ASPART PER 5 UNITS: Performed by: EMERGENCY MEDICINE

## 2021-02-28 PROCEDURE — 96372 THER/PROPH/DIAG INJ SC/IM: CPT

## 2021-02-28 PROCEDURE — 97162 PT EVAL MOD COMPLEX 30 MIN: CPT

## 2021-02-28 PROCEDURE — 25010000002 ENOXAPARIN PER 10 MG: Performed by: EMERGENCY MEDICINE

## 2021-02-28 PROCEDURE — G0378 HOSPITAL OBSERVATION PER HR: HCPCS

## 2021-02-28 PROCEDURE — 99225 PR SBSQ OBSERVATION CARE/DAY 25 MINUTES: CPT | Performed by: INTERNAL MEDICINE

## 2021-02-28 PROCEDURE — 63710000001 INSULIN DETEMIR PER 5 UNITS: Performed by: INTERNAL MEDICINE

## 2021-02-28 PROCEDURE — 94640 AIRWAY INHALATION TREATMENT: CPT

## 2021-02-28 RX ORDER — OXYCODONE HYDROCHLORIDE AND ACETAMINOPHEN 5; 325 MG/1; MG/1
1 TABLET ORAL EVERY 6 HOURS PRN
Status: DISCONTINUED | OUTPATIENT
Start: 2021-02-28 | End: 2021-03-02 | Stop reason: HOSPADM

## 2021-02-28 RX ADMIN — BUDESONIDE AND FORMOTEROL FUMARATE DIHYDRATE 2 PUFF: 80; 4.5 AEROSOL RESPIRATORY (INHALATION) at 19:58

## 2021-02-28 RX ADMIN — ASPIRIN 325 MG: 325 TABLET, COATED ORAL at 08:45

## 2021-02-28 RX ADMIN — INSULIN ASPART 10 UNITS: 100 INJECTION, SOLUTION INTRAVENOUS; SUBCUTANEOUS at 16:46

## 2021-02-28 RX ADMIN — GABAPENTIN 300 MG: 300 CAPSULE ORAL at 21:01

## 2021-02-28 RX ADMIN — LISINOPRIL 5 MG: 5 TABLET ORAL at 08:46

## 2021-02-28 RX ADMIN — BUDESONIDE AND FORMOTEROL FUMARATE DIHYDRATE 2 PUFF: 80; 4.5 AEROSOL RESPIRATORY (INHALATION) at 07:14

## 2021-02-28 RX ADMIN — OXYCODONE HYDROCHLORIDE AND ACETAMINOPHEN 1 TABLET: 5; 325 TABLET ORAL at 12:27

## 2021-02-28 RX ADMIN — METOPROLOL SUCCINATE 200 MG: 100 TABLET, EXTENDED RELEASE ORAL at 22:09

## 2021-02-28 RX ADMIN — ATORVASTATIN CALCIUM 80 MG: 80 TABLET, FILM COATED ORAL at 21:01

## 2021-02-28 RX ADMIN — SODIUM CHLORIDE, PRESERVATIVE FREE 10 ML: 5 INJECTION INTRAVENOUS at 08:46

## 2021-02-28 RX ADMIN — CLOPIDOGREL BISULFATE 75 MG: 75 TABLET ORAL at 08:45

## 2021-02-28 RX ADMIN — AMITRIPTYLINE HYDROCHLORIDE 75 MG: 50 TABLET, FILM COATED ORAL at 21:01

## 2021-02-28 RX ADMIN — PANTOPRAZOLE SODIUM 40 MG: 40 TABLET, DELAYED RELEASE ORAL at 06:21

## 2021-02-28 RX ADMIN — ENOXAPARIN SODIUM 60 MG: 60 INJECTION SUBCUTANEOUS at 22:09

## 2021-02-28 RX ADMIN — INSULIN ASPART 5 UNITS: 100 INJECTION, SOLUTION INTRAVENOUS; SUBCUTANEOUS at 11:48

## 2021-02-28 RX ADMIN — AMLODIPINE BESYLATE 5 MG: 5 TABLET ORAL at 08:46

## 2021-02-28 RX ADMIN — INSULIN DETEMIR 25 UNITS: 100 INJECTION, SOLUTION SUBCUTANEOUS at 08:46

## 2021-02-28 RX ADMIN — GABAPENTIN 300 MG: 300 CAPSULE ORAL at 06:24

## 2021-02-28 RX ADMIN — CHLORTHALIDONE 12.5 MG: 25 TABLET ORAL at 08:45

## 2021-02-28 RX ADMIN — OXYCODONE HYDROCHLORIDE AND ACETAMINOPHEN 1 TABLET: 5; 325 TABLET ORAL at 21:04

## 2021-02-28 RX ADMIN — SPIRONOLACTONE 25 MG: 25 TABLET, FILM COATED ORAL at 08:45

## 2021-02-28 RX ADMIN — INSULIN ASPART 10 UNITS: 100 INJECTION, SOLUTION INTRAVENOUS; SUBCUTANEOUS at 06:30

## 2021-02-28 RX ADMIN — INSULIN ASPART 5 UNITS: 100 INJECTION, SOLUTION INTRAVENOUS; SUBCUTANEOUS at 08:46

## 2021-02-28 RX ADMIN — SODIUM CHLORIDE, PRESERVATIVE FREE 10 ML: 5 INJECTION INTRAVENOUS at 21:06

## 2021-02-28 RX ADMIN — INSULIN DETEMIR 25 UNITS: 100 INJECTION, SOLUTION SUBCUTANEOUS at 21:07

## 2021-02-28 RX ADMIN — INSULIN ASPART 5 UNITS: 100 INJECTION, SOLUTION INTRAVENOUS; SUBCUTANEOUS at 16:46

## 2021-02-28 RX ADMIN — GABAPENTIN 300 MG: 300 CAPSULE ORAL at 14:36

## 2021-02-28 RX ADMIN — INSULIN ASPART 10 UNITS: 100 INJECTION, SOLUTION INTRAVENOUS; SUBCUTANEOUS at 11:48

## 2021-03-01 ENCOUNTER — APPOINTMENT (OUTPATIENT)
Dept: CARDIOLOGY | Facility: HOSPITAL | Age: 61
End: 2021-03-01

## 2021-03-01 LAB
ANION GAP SERPL CALCULATED.3IONS-SCNC: 8.1 MMOL/L (ref 5–15)
BUN SERPL-MCNC: 15 MG/DL (ref 6–20)
BUN/CREAT SERPL: 19 (ref 7–25)
CALCIUM SPEC-SCNC: 9.3 MG/DL (ref 8.6–10.5)
CHLORIDE SERPL-SCNC: 98 MMOL/L (ref 98–107)
CO2 SERPL-SCNC: 27.9 MMOL/L (ref 22–29)
CREAT SERPL-MCNC: 0.79 MG/DL (ref 0.76–1.27)
GFR SERPL CREATININE-BSD FRML MDRD: 100 ML/MIN/1.73
GLUCOSE BLDC GLUCOMTR-MCNC: 304 MG/DL (ref 70–130)
GLUCOSE BLDC GLUCOMTR-MCNC: 321 MG/DL (ref 70–130)
GLUCOSE BLDC GLUCOMTR-MCNC: 361 MG/DL (ref 70–130)
GLUCOSE BLDC GLUCOMTR-MCNC: 451 MG/DL (ref 70–130)
GLUCOSE SERPL-MCNC: 342 MG/DL (ref 65–99)
POTASSIUM SERPL-SCNC: 4.2 MMOL/L (ref 3.5–5.2)
SODIUM SERPL-SCNC: 134 MMOL/L (ref 136–145)

## 2021-03-01 PROCEDURE — G0378 HOSPITAL OBSERVATION PER HR: HCPCS

## 2021-03-01 PROCEDURE — 97530 THERAPEUTIC ACTIVITIES: CPT

## 2021-03-01 PROCEDURE — 92610 EVALUATE SWALLOWING FUNCTION: CPT

## 2021-03-01 PROCEDURE — 63710000001 INSULIN ASPART PER 5 UNITS: Performed by: INTERNAL MEDICINE

## 2021-03-01 PROCEDURE — 99225 PR SBSQ OBSERVATION CARE/DAY 25 MINUTES: CPT | Performed by: INTERNAL MEDICINE

## 2021-03-01 PROCEDURE — 63710000001 INSULIN ASPART PER 5 UNITS: Performed by: EMERGENCY MEDICINE

## 2021-03-01 PROCEDURE — 94799 UNLISTED PULMONARY SVC/PX: CPT

## 2021-03-01 PROCEDURE — 97116 GAIT TRAINING THERAPY: CPT

## 2021-03-01 PROCEDURE — 25010000002 ENOXAPARIN PER 10 MG: Performed by: EMERGENCY MEDICINE

## 2021-03-01 PROCEDURE — 93306 TTE W/DOPPLER COMPLETE: CPT

## 2021-03-01 PROCEDURE — 63710000001 INSULIN DETEMIR PER 5 UNITS: Performed by: INTERNAL MEDICINE

## 2021-03-01 PROCEDURE — 96372 THER/PROPH/DIAG INJ SC/IM: CPT

## 2021-03-01 PROCEDURE — 97165 OT EVAL LOW COMPLEX 30 MIN: CPT

## 2021-03-01 PROCEDURE — 99214 OFFICE O/P EST MOD 30 MIN: CPT | Performed by: PSYCHIATRY & NEUROLOGY

## 2021-03-01 PROCEDURE — 80048 BASIC METABOLIC PNL TOTAL CA: CPT | Performed by: INTERNAL MEDICINE

## 2021-03-01 PROCEDURE — 82962 GLUCOSE BLOOD TEST: CPT

## 2021-03-01 PROCEDURE — 93306 TTE W/DOPPLER COMPLETE: CPT | Performed by: INTERNAL MEDICINE

## 2021-03-01 RX ORDER — SPIRONOLACTONE 25 MG/1
25 TABLET ORAL DAILY
Qty: 30 TABLET | Refills: 0 | Status: SHIPPED | OUTPATIENT
Start: 2021-03-01 | End: 2021-06-14 | Stop reason: SDUPTHER

## 2021-03-01 RX ADMIN — BUDESONIDE AND FORMOTEROL FUMARATE DIHYDRATE 2 PUFF: 80; 4.5 AEROSOL RESPIRATORY (INHALATION) at 19:25

## 2021-03-01 RX ADMIN — INSULIN ASPART 5 UNITS: 100 INJECTION, SOLUTION INTRAVENOUS; SUBCUTANEOUS at 17:47

## 2021-03-01 RX ADMIN — SODIUM CHLORIDE, PRESERVATIVE FREE 10 ML: 5 INJECTION INTRAVENOUS at 08:56

## 2021-03-01 RX ADMIN — METOPROLOL SUCCINATE 200 MG: 100 TABLET, EXTENDED RELEASE ORAL at 23:20

## 2021-03-01 RX ADMIN — GABAPENTIN 300 MG: 300 CAPSULE ORAL at 14:09

## 2021-03-01 RX ADMIN — INSULIN ASPART 5 UNITS: 100 INJECTION, SOLUTION INTRAVENOUS; SUBCUTANEOUS at 08:55

## 2021-03-01 RX ADMIN — GABAPENTIN 300 MG: 300 CAPSULE ORAL at 06:36

## 2021-03-01 RX ADMIN — INSULIN ASPART 20 UNITS: 100 INJECTION, SOLUTION INTRAVENOUS; SUBCUTANEOUS at 11:10

## 2021-03-01 RX ADMIN — SPIRONOLACTONE 25 MG: 25 TABLET, FILM COATED ORAL at 08:55

## 2021-03-01 RX ADMIN — CHLORTHALIDONE 12.5 MG: 25 TABLET ORAL at 08:54

## 2021-03-01 RX ADMIN — INSULIN ASPART 10 UNITS: 100 INJECTION, SOLUTION INTRAVENOUS; SUBCUTANEOUS at 17:46

## 2021-03-01 RX ADMIN — BUDESONIDE AND FORMOTEROL FUMARATE DIHYDRATE 2 PUFF: 80; 4.5 AEROSOL RESPIRATORY (INHALATION) at 07:40

## 2021-03-01 RX ADMIN — CLOPIDOGREL BISULFATE 75 MG: 75 TABLET ORAL at 08:55

## 2021-03-01 RX ADMIN — SODIUM CHLORIDE, PRESERVATIVE FREE 10 ML: 5 INJECTION INTRAVENOUS at 21:49

## 2021-03-01 RX ADMIN — INSULIN ASPART 5 UNITS: 100 INJECTION, SOLUTION INTRAVENOUS; SUBCUTANEOUS at 11:11

## 2021-03-01 RX ADMIN — LISINOPRIL 5 MG: 5 TABLET ORAL at 08:54

## 2021-03-01 RX ADMIN — INSULIN DETEMIR 35 UNITS: 100 INJECTION, SOLUTION SUBCUTANEOUS at 21:44

## 2021-03-01 RX ADMIN — PANTOPRAZOLE SODIUM 40 MG: 40 TABLET, DELAYED RELEASE ORAL at 06:36

## 2021-03-01 RX ADMIN — ASPIRIN 325 MG: 325 TABLET, COATED ORAL at 08:55

## 2021-03-01 RX ADMIN — GABAPENTIN 300 MG: 300 CAPSULE ORAL at 21:44

## 2021-03-01 RX ADMIN — AMITRIPTYLINE HYDROCHLORIDE 75 MG: 50 TABLET, FILM COATED ORAL at 21:49

## 2021-03-01 RX ADMIN — ATORVASTATIN CALCIUM 80 MG: 80 TABLET, FILM COATED ORAL at 21:43

## 2021-03-01 RX ADMIN — INSULIN DETEMIR 35 UNITS: 100 INJECTION, SOLUTION SUBCUTANEOUS at 08:56

## 2021-03-01 RX ADMIN — INSULIN ASPART 12 UNITS: 100 INJECTION, SOLUTION INTRAVENOUS; SUBCUTANEOUS at 06:38

## 2021-03-01 RX ADMIN — ENOXAPARIN SODIUM 60 MG: 60 INJECTION SUBCUTANEOUS at 23:20

## 2021-03-01 NOTE — THERAPY EVALUATION
Acute Care - Speech Language Pathology   Swallow Initial Evaluation  Don     Patient Name: Denzel Harding  : 1960  MRN: [id number]  Today's Date: 3/1/2021               Admit Date: 2021    Visit Dx:     ICD-10-CM ICD-9-CM   1. Right sided numbness  R20.0 782.0   2. Slurred speech  R47.81 784.59   3. Cerebrovascular accident (CVA), unspecified mechanism (CMS/[facility])  I63.9 434.91     Patient Active Problem List   Diagnosis   • Coronary arteriosclerosis in native artery   • Lumbar radiculopathy   • Hypercholesterolemia   • Diabetic polyneuropathy associated with type 2 diabetes mellitus (CMS/[facility])   • Migraine with aura and with status migrainosus   • Palpitations   • GERD (gastroesophageal reflux disease)   • Brachial neuritis   • Cervical radiculopathy   • LOM (loss of memory)   • Anxiety   • Diabetes mellitus (CMS/[facility])   • Lower back pain   • Essential hypertension   • History of CVA (cerebrovascular accident)   • Non morbid obesity due to excess calories   • Post-infarction pericarditis (CMS/[facility])   • HCAP (healthcare-associated pneumonia)   • Acute CVA (cerebrovascular accident) (CMS/[facility])   • Acute bronchitis   • Type 2 diabetes mellitus (CMS/[facility])   • Headache syndrome, complicated   • Chronic intractable headache   • Obstructive sleep apnea syndrome   • Acute exacerbation of asthma with allergic rhinitis   • Asthma   • Thrush   • Acute diastolic CHF (congestive heart failure) (CMS/[facility])   • Chronic diastolic CHF (congestive heart failure) (CMS/[facility])   • COPD (chronic obstructive pulmonary disease) (CMS/[facility])   • JUAN (acute kidney injury) (CMS/[facility])   • Acute sinusitis   • Chelsea ronnie's stroke   • Vertebrobasilar insufficiency   • Personal history of colonic polyps   • Pre-syncope   • Lactic acidosis   • Orthostatic hypotension   • Morbid obesity (CMS/[facility])   • Primary localized osteoarthrosis of the knee, right   • Tear of medial meniscus of right knee, current   • Edema   • Shortness of breath   •  Chest pain   • Hyperlipidemia   • Type 2 diabetes mellitus with retinopathy, with long-term current use of insulin (CMS/[facility])   • Insulin long-term use (CMS/[facility])   • Right sided numbness   • Polypharmacy     Past Medical History:   Diagnosis Date   • Anxiety    • Arthritis    • Asthma    • Brachial neuritis 1/19/2017   • Cellulitis     left arm and right leg    • Chest pain    • CHF (congestive heart failure) (CMS/[facility])     Patient reported history May 2020    • COPD (chronic obstructive pulmonary disease) (CMS/[facility])    • Coronary artery disease     Patient reported CABG , 3 vessel   • Depression    • Diabetes mellitus (CMS/[facility])     diagnosed in 1998, checks fsbg 3-4x/day   • Dizziness    • Edema    • Elevated cholesterol    • GERD (gastroesophageal reflux disease)    • H/O chest x-ray 07/04/2015    Mild Left base atelectasis   • H/O echocardiogram 07/05/2015    Normal LVSF. EF of 60-65%. Grade 1 diastolic dysfunction of the LV myocardium. No evidence of pericardial effusion   • H/O exercise stress test    • Hearing loss     No use of hearing aids   • History of fracture     Reported 2 bones in left foot and a finger   • History of herniated intervertebral disc     History of left L5-S1 disc herniation   • History of pneumonia    • History of pneumonia    • Hyperlipidemia    • Hypertension    • Impaired functional mobility, balance, gait, and endurance    • LOM (loss of memory) 1/19/2017   • Lower back pain     Right   • Measles    • MUSE (nonalcoholic steatohepatitis)    • Neuropathy     feet    • EDD treated with BiPAP     BiPAP HS - instructed to bring mask/machine DOS (setting 13 and 5)   • Palpitations    • Pericardial effusion    • Poor dentition     Patient reported missing multiple teeth   • Renal disorder    • Seizure disorder (CMS/[facility])     focal seizures   • SOB (shortness of breath)    • Staph infection     back after sugery at the incision site    • Stroke (CMS/[facility])     x2 most recent , slight  weakness in hands occasionaly    • Wears glasses      Past Surgical History:   Procedure Laterality Date   • BACK SURGERY     • CARDIAC CATHETERIZATION     • CARDIAC CATHETERIZATION N/A 8/11/2017    Procedure: Coronary angiography;  Surgeon: Dallas Hernandez MD;  Location: [facility] CATH INVASIVE LOCATION;  Service:    • CARDIAC CATHETERIZATION N/A 8/11/2017    Procedure: Left Heart Cath;  Surgeon: Dallas Hernandez MD;  Location: [facility] CATH INVASIVE LOCATION;  Service:    • CARDIAC CATHETERIZATION N/A 8/11/2017    Procedure: Left ventriculography;  Surgeon: Dallas Hernandez MD;  Location: [facility] CATH INVASIVE LOCATION;  Service:    • CARDIAC CATHETERIZATION      2002 x1 stent,  x1 stent   • CARDIOVASCULAR STRESS TEST  07/03/2017    WITH DR HERNANDEZ AT [facility]   • CARPAL TUNNEL RELEASE Right    • CATARACT EXTRACTION W/ INTRAOCULAR LENS IMPLANT Right 6/12/2017    Procedure: CATARACT PHACO EXTRACTION WITH INTRAOCULAR LENS IMPLANT RIGHT ;  Surgeon: Natalie Cao MD;  Location: [facility] OR;  Service:    • CATARACT EXTRACTION W/ INTRAOCULAR LENS IMPLANT Left 7/10/2017    Procedure: CATARACT PHACO EXTRACTION WITH INTRAOCULAR LENS IMPLANT LEFT;  Surgeon: Natalie Cao MD;  Location: [facility] OR;  Service:    • CHOLECYSTECTOMY     • COLONOSCOPY     • COLONOSCOPY N/A 7/2/2020    Procedure: COLONOSCOPY;  Surgeon: Petra Crowell MD;  Location: [facility] ENDOSCOPY;  Service: Gastroenterology;  Laterality: N/A;  poor prep   • CORONARY ANGIOPLASTY WITH STENT PLACEMENT      X1-LAD   • CORONARY ARTERY BYPASS GRAFT N/A 8/15/2017    Procedure: CORONARY ARTERY BYPASS GRAFTING x 3 UTILIZING THE LEFT INTERNAL MAMMARY ARTERY WITH ENDOSCOPIC VEIN HARVESTING OF THE RIGHT GREATER SAPHENOUS VEIN, HAMLET, LAD ENDARECTOMY;  Surgeon: London Damon MD;  Location: [facility] OR;  Service:    • ENDOSCOPY     • EYE CAPSULOTOMY WITH LASER Right 11/25/2019    Procedure: EYE CAPSULOTOMY WITH LASER RIGHT;  Surgeon: Natalie Cao MD;  Location: [facility]  OR;  Service: Ophthalmology   • EYE CAPSULOTOMY WITH LASER Left 12/9/2019    Procedure: EYE CAPSULOTOMY WITH LASER LEFT;  Surgeon: Natalie Cao MD;  Location: [facility] OR;  Service: Ophthalmology   • EYE SURGERY      cataract surgery both eyes   • INTERVENTIONAL RADIOLOGY PROCEDURE Bilateral 12/31/2019    Procedure: Carotid Cerebral Angiogram;  Surgeon: Damian Dubois MD;  Location: [facility] CATH INVASIVE LOCATION;  Service: Interventional Radiology   • KNEE ARTHROSCOPY Bilateral    • KNEE ARTHROSCOPY Right 2010    Dr Lewis   • KNEE ARTHROSCOPY Left 2008    Dr Jameson   • KNEE MENISCECTOMY Right 10/15/2020    Procedure: Right knee arthroscopy with partial medial and lateral meniscectomy and three compartment chondroplasty.;  Surgeon: South Lewis MD;  Location: [facility] OR;  Service: Orthopedics;  Laterality: Right;   • MOUTH SURGERY      Oral extractions   • NEUROPLASTY / TRANSPOSITION ULNAR NERVE AT ELBOW Left    • OTHER SURGICAL HISTORY      Foraminotomy and discectomy   • PERICARDIAL WINDOW N/A 8/25/2017    Procedure: PERICARDIAL WINDOW;  Surgeon: Freeman Phillips MD;  Location: [facility] OR;  Service:         SWALLOW EVALUATION (last 72 hours)      SLP Adult Swallow Evaluation     Row Name 03/01/21 0935                   Rehab Evaluation    Document Type  evaluation  -TM        Subjective Information  no complaints  -TM        Patient Observations  alert;cooperative  -TM        Patient/Family/Caregiver Comments/Observations  no family present  -TM        Patient Effort  good  -TM           General Information    Patient Profile Reviewed  yes  -TM        Pertinent History Of Current Problem  CVA, GERD, cardiac, COPD, seizures  -TM        Current Method of Nutrition  regular textures;thin liquids  -TM        Precautions/Limitations, Vision  WFL  -TM        Precautions/Limitations, Hearing  WFL  -TM        Prior Level of Function-Communication  WFL  -TM        Prior Level of Function-Swallowing  no diet  consistency restrictions  -TM        Plans/Goals Discussed with  patient;other (see comments) MD & RN  -TM        Barriers to Rehab  none identified  -TM        Patient's Goals for Discharge  patient did not state  -TM           Pain    Additional Documentation  Pain Scale: Numbers Pre/Post-Treatment (Group)  -TM           Pain Scale: Numbers Pre/Post-Treatment    Pretreatment Pain Rating  4/10  -TM        Posttreatment Pain Rating  4/10  -TM        Pain Location  back;other (see comments)  & legs  -TM           Oral Motor Structure and Function    Oral Lesions or Structural Abnormalities and/or variants  none identified  -TM        Dentition Assessment  missing teeth;other (see comments) few teeth  -TM        Secretion Management  WNL/WFL  -TM        Mucosal Quality  moist, healthy  -TM        Volitional Cough  WFL  -TM           Oral Musculature and Cranial Nerve Assessment    Oral Motor General Assessment  WFL  -TM           General Eating/Swallowing Observations    Respiratory Support Currently in Use  room air  -TM        Eating/Swallowing Skills  fed by SLP  -TM        Positioning During Eating  upright in bed  -TM        Utensils Used  spoon;straw  -TM        Consistencies Trialed  regular textures;pureed;thin liquids  -TM           Respiratory    Respiratory Status  WFL  -TM           Clinical Swallow Eval    Oral Prep Phase  WFL  -TM        Oral Transit  WFL  -TM        Oral Residue  WFL  -TM        Pharyngeal Phase  no overt signs/symptoms of pharyngeal impairment  -TM        Clinical Swallow Evaluation Summary  Order received to evaluate patient's communication and swallow status.  Communication regarding receptive and expressive language skills was WFL with no deficits in memory or conversational speech.  His articulation was WFL, fluency was WFL, and no cognitive deficits identified.  Bedside eval of swallow completed with pt. seated upright in bed.  Pt. was given po trials of regular solid, puree, and  thin liquid.  Oral phase was remarkable for extended prep time with regular solid due to amount of missing teeth, but adequate and pt. stated he wished to continue regular solids avoiding any specific foods that were too tough.  No overt s/s aspiration or any other pharyngeal phase dysphagia with any trial.  No hx of oropharyngeal dysphagia and pt. denied oropharyngeal difficulty. He does have GERD and occasional belching post various po trials.  Recommend:  1. continue regular diet with thin liq as clementina,  2. meds whole with thin liq as clementina,  3. aspiration precautions, 4. reflux precautions.    -TM           Clinical Impression    SLP Swallowing Diagnosis  swallow WFL;esophageal dysphagia  -TM        Functional Impact  no impact on function  -TM        Swallow Criteria for Skilled Therapeutic Interventions Met  no problems identified which require skilled intervention  -TM           Recommendations    Therapy Frequency (Swallow)  evaluation only  -TM        SLP Diet Recommendation  regular textures;thin liquids  -TM        Recommended Diagnostics  No further SLP services recommended  -TM        Recommended Precautions and Strategies  upright posture during/after eating;general aspiration precautions;reflux precautions  -TM        Oral Care Recommendations  Oral Care BID/PRN  -TM        SLP Rec. for Method of Medication Administration  meds whole;with thin liquids;as tolerated  -TM        Monitor for Signs of Aspiration  yes;notify SLP if any concerns;gurgly voice;throat clearing;right lower lobe infiltrates  -TM        Anticipated Discharge Disposition (SLP)  unknown  -TM          User Key  (r) = Recorded By, (t) = Taken By, (c) = Cosigned By    Initials Name Effective Dates    Ivania Cason 03/07/18 -           EDUCATION  The patient has been educated in the following areas:   Dysphagia (Swallowing Impairment).    SLP Recommendation and Plan  SLP Swallowing Diagnosis: swallow WFL, esophageal dysphagia  SLP  Diet Recommendation: regular textures, thin liquids  Recommended Precautions and Strategies: upright posture during/after eating, general aspiration precautions, reflux precautions  SLP Rec. for Method of Medication Administration: meds whole, with thin liquids, as tolerated     Monitor for Signs of Aspiration: yes, notify SLP if any concerns, gurgly voice, throat clearing, right lower lobe infiltrates  Recommended Diagnostics: No further SLP services recommended  Swallow Criteria for Skilled Therapeutic Interventions Met: no problems identified which require skilled intervention  Anticipated Discharge Disposition (SLP): unknown     Therapy Frequency (Swallow): evaluation only                            Plan of Care Reviewed With: patient  Outcome Summary: Order received to evaluate patient's communication and swallow status.  Communication regarding receptive and expressive language skills was WFL with no deficits in memory or conversational speech.  His articulation was WFL, fluency was WFL, and no cognitive deficits identified.  Bedside eval of swallow completed with pt. seated upright in bed.  Pt. was given po trials of regular solid, puree, and thin liquid.  Oral phase was remarkable for extended prep time with regular solid due to amount of missing teeth, but adequate and pt. stated he wished to continue regular solids avoiding any specific foods that were too tough.  No overt s/s aspiration or any other pharyngeal phase dysphagia with any trial.  No hx of oropharyngeal dysphagia and pt. denied oropharyngeal difficulty. He does have GERD and occasional belching post various po trials.  Recommend:  1. continue regular diet with thin liq as clementina,  2. meds whole with thin liq as clementina,  3. aspiration precautions, 4. reflux precautions.           Time Calculation:   Time Calculation- SLP     Row Name 03/01/21 1046             Time Calculation- SLP    SLP Start Time  0935  -TM      SLP Stop Time  0943  -TM      SLP  Time Calculation (min)  8 min  -TM      SLP Received On  03/01/21  -        User Key  (r) = Recorded By, (t) = Taken By, (c) = Cosigned By    Initials Name Provider Type    Ivania Cason Speech and Language Pathologist          Therapy Charges for Today     Code Description Service Date Service Provider Modifiers Qty    [id number] HC ST EVAL ORAL PHARYNG SWALLOW 4 3/1/2021 Ivania Driscoll GN 1               Ivania Driscoll  3/1/2021

## 2021-03-01 NOTE — PLAN OF CARE
Goal Outcome Evaluation:  Plan of Care Reviewed With: patient  Progress: improving  Outcome Summary: VSS. Blood glucose monitoring. NIH score improving

## 2021-03-01 NOTE — PLAN OF CARE
Goal Outcome Evaluation:  Plan of Care Reviewed With: patient     Outcome Summary: Order received to evaluate patient's communication and swallow status.  Communication regarding receptive and expressive language skills was WFL with no deficits in memory or conversational speech.  His articulation was WFL, fluency was WFL, and no cognitive deficits identified.  Bedside eval of swallow completed with pt. seated upright in bed.  Pt. was given po trials of regular solid, puree, and thin liquid.  Oral phase was remarkable for extended prep time with regular solid due to amount of missing teeth, but adequate and pt. stated he wished to continue regular solids avoiding any specific foods that were too tough.  No overt s/s aspiration or any other pharyngeal phase dysphagia with any trial.  No hx of oropharyngeal dysphagia and pt. denied oropharyngeal difficulty. He does have GERD and occasional belching post various po trials.  Recommend:  1. continue regular diet with thin liq as clementina,  2. meds whole with thin liq as clementina,  3. aspiration precautions, 4. reflux precautions.

## 2021-03-01 NOTE — PLAN OF CARE
Goal Outcome Evaluation:  Plan of Care Reviewed With: patient  Progress: improving  Outcome Summary: Patient is able to perform mobility tasks with no more than CGA.  He becomes lightheaded during gait.  His vital signs were taken and found to be withing normal limits.  He feels fine once he is seated for a few minutes.  He is expected to benefit from additional PT servcies.

## 2021-03-01 NOTE — PROGRESS NOTES
[facility]    Name:  Denzel Harding   Age:  60 y.o.  Sex:  male  :  1960  MRN:  [id number]   Visit Number:  [id number]  Admission Date:  2021  Date Of Service:  21  Primary Care Physician:  Isaura Godoy MD     LOS: 1 day :  Patient Care Team:  Isaura Godoy MD as PCP - General (Internal Medicine)  London Jiménez III, MD as Cardiologist (Cardiology)  Blaine Waters MD as Consulting Physician (Endocrinology)  Torres Kim MD as Consulting Physician (General Surgery):    Chief Complaint:      Right-sided numbness.    Subjective / Interval History:     Mr. Harding was seen and examined this morning.  He is currently lying down on the bed and is comfortable at rest.  He states that his right-sided numbness is slightly better especially on his face.  Denies any new complaints.  No significant overnight events.  He has been tolerating oral diet without any difficulty.  He was seen by speech therapy today and they have recommended regular diet with thin liquids as tolerated.    He was admitted from the emergency room on 2021 with symptoms of right-sided numbness and blurry vision.  He was seen by telemetry neurology who recommended increasing his aspirin dose to 325 mg.  CT of the head, CT angiogram of the head and neck were unremarkable.  Patient has had recent episodes of strokelike symptoms and is currently on dual antiplatelet agents with aspirin and Plavix.  Unfortunately, he has uncontrolled diabetes.      Review of Systems:     General ROS: Patient denies any fevers or chills.  Generalized weakness.  Respiratory ROS: Denies cough or shortness of breath.  Cardiovascular ROS: Denies chest pain or palpitations.  Gastrointestinal ROS: Denies nausea and vomiting. Denies any abdominal pain.  Neurological ROS: Denies any focal weakness but gives history of right-sided numbness.   Dermatological ROS: Denies any redness or  pruritis.    Vital Signs:    Temp:  [97.9 °F (36.6 °C)-99.3 °F (37.4 °C)] 98.1 °F (36.7 °C)  Heart Rate:  [75-80] 75  Resp:  [16-19] 18  BP: (111-151)/(52-73) 151/73    Intake and output:    I/O last 3 completed shifts:  In: 1080 [P.O.:1080]  Out: -   I/O this shift:  In: 480 [P.O.:480]  Out: -     Physical Examination:    General Appearance:  Alert and cooperative, not in any acute distress.   Head:  Atraumatic and normocephalic, without obvious abnormality.   Eyes:          Conjunctivae and sclerae normal, no Icterus. No pallor. Extraocular movements are within normal limits.   Neck: Supple, trachea midline, no thyromegaly, no carotid bruit.   Lungs:   Chest shape is normal. Breath sounds heard bilaterally equally.  No crackles or wheezing. No pleural rub or bronchial breathing.   Heart:  Normal S1 and S2, no murmur, no gallop, no rub. No JVD.  CABG scar noted.   Abdomen:   Normal bowel sounds, no masses, no organomegaly. Soft, nontender, obese, no guarding, no rebound tenderness.   Extremities: Supple, 1+ edema, no cyanosis, no clubbing.   Skin: No bleeding or rash.   Neurologic: Awake, alert and oriented times 3. Moves all 4 extremities equally.  Hand  is strong bilaterally.  No tremors.  Mild decrease in gross sensation noted on the right side.     Laboratory results:    Results from last 7 days   Lab Units 03/01/21  0640 02/27/21  1730   SODIUM mmol/L 134* 131*   POTASSIUM mmol/L 4.2 4.3   CHLORIDE mmol/L 98 94*   CO2 mmol/L 27.9 27.2   BUN mg/dL 15 26*   CREATININE mg/dL 0.79 1.08   CALCIUM mg/dL 9.3 9.5   BILIRUBIN mg/dL  --  0.7   ALK PHOS U/L  --  100   ALT (SGPT) U/L  --  48*   AST (SGOT) U/L  --  29   GLUCOSE mg/dL 342* 423*     Results from last 7 days   Lab Units 02/27/21  1730   WBC 10*3/mm3 8.38   HEMOGLOBIN g/dL 13.4   HEMATOCRIT % 40.0   PLATELETS 10*3/mm3 182     Results from last 7 days   Lab Units 02/27/21  1730   INR  1.05     Results from last 7 days   Lab Units 02/27/21  1730   TROPONIN T  ng/mL 0.010     I have reviewed the patient's laboratory results.    Radiology results:    Imaging Results (Last 24 Hours)     ** No results found for the last 24 hours. **        I have reviewed the patient's radiology reports.    Medication Review:     I have reviewed the patient's active and prn medications.     Problem List:      Acute CVA (cerebrovascular accident) (CMS/HCC)    Coronary arteriosclerosis in native artery    Diabetic polyneuropathy associated with type 2 diabetes mellitus (CMS/HCC)    Obstructive sleep apnea syndrome    Morbid obesity (CMS/HCC)    Right sided numbness    Polypharmacy    Assessment:    1.  Right-sided numbness possibly related to left thalamic lacunar stroke, POA.  2.  Diabetes mellitus type 2 with hyperglycemia, POA.  3.  Coronary artery disease status post CABG.  4.  Essential hypertension.  5.  Morbid obesity with a BMI of 40.    Plan:    Mr. Harding is currently hemodynamically stable and is slightly better compared to yesterday with regards to his right-sided numbness.  He is currently on full dose aspirin and Plavix.  He was seen by neurology this morning and they have recommended transesophageal echocardiogram.  I have discussed the patient's condition with Dr. Tubbs from cardiology and the patient will be scheduled for HAMLET tomorrow.  We will keep him n.p.o. after midnight.    Patient will be continued on physical and occupational therapy.  He was seen by speech therapy and they recommended in liquids as tolerated.  No evidence of atrial fibrillation on telemetry.  He recently had a 2D echocardiogram on 1/27/2021 and it showed normal left ventricular systolic and diastolic functions but showed severely increased left atrial volume index.    Patient does have uncontrolled diabetes with a hemoglobin A1c level of 12.  We will increase his Levemir dose 35 units twice daily and this will be further increased depending upon his blood sugars.  I have strongly advised him to be  compliant with diabetic diet.    He is on Lovenox for DVT prophylaxis.  His home medications have been continued.  Hopefully, he may be able to go home tomorrow after the HAMLET.  Discussed with nursing staff and multidisciplinary team.    Ray Walker MD  03/01/21  15:51 EST    Dictated utilizing Dragon dictation.

## 2021-03-01 NOTE — THERAPY DISCHARGE NOTE
Acute Care - Occupational Therapy Discharge  [facility]    Patient Name: Denzel Harding  : 1960    MRN: [id number]                              Today's Date: 3/1/2021       Admit Date: 2021    Visit Dx:     ICD-10-CM ICD-9-CM   1. Right sided numbness  R20.0 782.0   2. Slurred speech  R47.81 784.59   3. Cerebrovascular accident (CVA), unspecified mechanism (CMS/Formerly [facility])  I63.9 434.91     Patient Active Problem List   Diagnosis   • Coronary arteriosclerosis in native artery   • Lumbar radiculopathy   • Hypercholesterolemia   • Diabetic polyneuropathy associated with type 2 diabetes mellitus (CMS/[facility])   • Migraine with aura and with status migrainosus   • Palpitations   • GERD (gastroesophageal reflux disease)   • Brachial neuritis   • Cervical radiculopathy   • LOM (loss of memory)   • Anxiety   • Diabetes mellitus (CMS/[facility])   • Lower back pain   • Essential hypertension   • History of CVA (cerebrovascular accident)   • Non morbid obesity due to excess calories   • Post-infarction pericarditis (CMS/[facility])   • HCAP (healthcare-associated pneumonia)   • Acute CVA (cerebrovascular accident) (CMS/[facility])   • Acute bronchitis   • Type 2 diabetes mellitus (CMS/[facility])   • Headache syndrome, complicated   • Chronic intractable headache   • Obstructive sleep apnea syndrome   • Acute exacerbation of asthma with allergic rhinitis   • Asthma   • Thrush   • Acute diastolic CHF (congestive heart failure) (CMS/[facility])   • Chronic diastolic CHF (congestive heart failure) (CMS/[facility])   • COPD (chronic obstructive pulmonary disease) (CMS/[facility])   • JUAN (acute kidney injury) (CMS/[facility])   • Acute sinusitis   • Dixon ronnie's stroke   • Vertebrobasilar insufficiency   • Personal history of colonic polyps   • Pre-syncope   • Lactic acidosis   • Orthostatic hypotension   • Morbid obesity (CMS/[facility])   • Primary localized osteoarthrosis of the knee, right   • Tear of medial meniscus of right knee, current   • Edema   • Shortness of breath   • Chest  pain   • Hyperlipidemia   • Type 2 diabetes mellitus with retinopathy, with long-term current use of insulin (CMS/[facility])   • Insulin long-term use (CMS/[facility])   • Right sided numbness   • Polypharmacy     Past Medical History:   Diagnosis Date   • Anxiety    • Arthritis    • Asthma    • Brachial neuritis 1/19/2017   • Cellulitis     left arm and right leg    • Chest pain    • CHF (congestive heart failure) (CMS/[facility])     Patient reported history May 2020    • COPD (chronic obstructive pulmonary disease) (CMS/[facility])    • Coronary artery disease     Patient reported CABG , 3 vessel   • Depression    • Diabetes mellitus (CMS/[facility])     diagnosed in 1998, checks fsbg 3-4x/day   • Dizziness    • Edema    • Elevated cholesterol    • GERD (gastroesophageal reflux disease)    • H/O chest x-ray 07/04/2015    Mild Left base atelectasis   • H/O echocardiogram 07/05/2015    Normal LVSF. EF of 60-65%. Grade 1 diastolic dysfunction of the LV myocardium. No evidence of pericardial effusion   • H/O exercise stress test    • Hearing loss     No use of hearing aids   • History of fracture     Reported 2 bones in left foot and a finger   • History of herniated intervertebral disc     History of left L5-S1 disc herniation   • History of pneumonia    • History of pneumonia    • Hyperlipidemia    • Hypertension    • Impaired functional mobility, balance, gait, and endurance    • LOM (loss of memory) 1/19/2017   • Lower back pain     Right   • Measles    • MUSE (nonalcoholic steatohepatitis)    • Neuropathy     feet    • EDD treated with BiPAP     BiPAP HS - instructed to bring mask/machine DOS (setting 13 and 5)   • Palpitations    • Pericardial effusion    • Poor dentition     Patient reported missing multiple teeth   • Renal disorder    • Seizure disorder (CMS/[facility])     focal seizures   • SOB (shortness of breath)    • Staph infection     back after sugery at the incision site    • Stroke (CMS/[facility])     x2 most recent , slight  weakness in hands occasionaly    • Wears glasses      Past Surgical History:   Procedure Laterality Date   • BACK SURGERY     • CARDIAC CATHETERIZATION     • CARDIAC CATHETERIZATION N/A 8/11/2017    Procedure: Coronary angiography;  Surgeon: Dallas Hernandez MD;  Location: [facility] CATH INVASIVE LOCATION;  Service:    • CARDIAC CATHETERIZATION N/A 8/11/2017    Procedure: Left Heart Cath;  Surgeon: Dallas Hernandez MD;  Location: [facility] CATH INVASIVE LOCATION;  Service:    • CARDIAC CATHETERIZATION N/A 8/11/2017    Procedure: Left ventriculography;  Surgeon: Dallas Hernandez MD;  Location: [facility] CATH INVASIVE LOCATION;  Service:    • CARDIAC CATHETERIZATION      2002 x1 stent,  x1 stent   • CARDIOVASCULAR STRESS TEST  07/03/2017    WITH DR HERNANDEZ AT [facility]   • CARPAL TUNNEL RELEASE Right    • CATARACT EXTRACTION W/ INTRAOCULAR LENS IMPLANT Right 6/12/2017    Procedure: CATARACT PHACO EXTRACTION WITH INTRAOCULAR LENS IMPLANT RIGHT ;  Surgeon: Natalie Cao MD;  Location: [facility] OR;  Service:    • CATARACT EXTRACTION W/ INTRAOCULAR LENS IMPLANT Left 7/10/2017    Procedure: CATARACT PHACO EXTRACTION WITH INTRAOCULAR LENS IMPLANT LEFT;  Surgeon: Natalie Cao MD;  Location: [facility] OR;  Service:    • CHOLECYSTECTOMY     • COLONOSCOPY     • COLONOSCOPY N/A 7/2/2020    Procedure: COLONOSCOPY;  Surgeon: Petra Crowell MD;  Location: [facility] ENDOSCOPY;  Service: Gastroenterology;  Laterality: N/A;  poor prep   • CORONARY ANGIOPLASTY WITH STENT PLACEMENT      X1-LAD   • CORONARY ARTERY BYPASS GRAFT N/A 8/15/2017    Procedure: CORONARY ARTERY BYPASS GRAFTING x 3 UTILIZING THE LEFT INTERNAL MAMMARY ARTERY WITH ENDOSCOPIC VEIN HARVESTING OF THE RIGHT GREATER SAPHENOUS VEIN, HAMLET, LAD ENDARECTOMY;  Surgeon: London Damon MD;  Location: [facility] OR;  Service:    • ENDOSCOPY     • EYE CAPSULOTOMY WITH LASER Right 11/25/2019    Procedure: EYE CAPSULOTOMY WITH LASER RIGHT;  Surgeon: Natalie Cao MD;  Location: [facility]  OR;  Service: Ophthalmology   • EYE CAPSULOTOMY WITH LASER Left 12/9/2019    Procedure: EYE CAPSULOTOMY WITH LASER LEFT;  Surgeon: Natalie Cao MD;  Location: [facility] OR;  Service: Ophthalmology   • EYE SURGERY      cataract surgery both eyes   • INTERVENTIONAL RADIOLOGY PROCEDURE Bilateral 12/31/2019    Procedure: Carotid Cerebral Angiogram;  Surgeon: Damian Dubois MD;  Location:  [facility] CATH INVASIVE LOCATION;  Service: Interventional Radiology   • KNEE ARTHROSCOPY Bilateral    • KNEE ARTHROSCOPY Right 2010    Dr Lewis   • KNEE ARTHROSCOPY Left 2008    Dr Jameson   • KNEE MENISCECTOMY Right 10/15/2020    Procedure: Right knee arthroscopy with partial medial and lateral meniscectomy and three compartment chondroplasty.;  Surgeon: South Lewis MD;  Location: [facility] OR;  Service: Orthopedics;  Laterality: Right;   • MOUTH SURGERY      Oral extractions   • NEUROPLASTY / TRANSPOSITION ULNAR NERVE AT ELBOW Left    • OTHER SURGICAL HISTORY      Foraminotomy and discectomy   • PERICARDIAL WINDOW N/A 8/25/2017    Procedure: PERICARDIAL WINDOW;  Surgeon: Freeman Phillips MD;  Location: [facility] OR;  Service:      General Information     Row Name 03/01/21 1224          OT Time and Intention    Document Type  discharge evaluation/summary  -     Mode of Treatment  occupational therapy  -     Row Name 03/01/21 1224          General Information    Patient Profile Reviewed  yes  -     Prior Level of Function  independent:;community mobility;ADL's  -     Existing Precautions/Restrictions  fall  -     Barriers to Rehab  medically complex  -     Row Name 03/01/21 1224          Occupational Profile    Reason for Services/Referral (Occupational Profile)  ADL decline  -     Row Name 03/01/21 1224          Living Environment    Lives With  spouse;child(yenni), adult  -     Row Name 03/01/21 1224          Home Main Entrance    Number of Stairs, Main Entrance  none ramp to enter  -     Row Name 03/01/21 1224           Cognition    Orientation Status (Cognition)  oriented x 4  -[facility] Name 03/01/21 1224          Safety Issues, Functional Mobility    Safety Issues Affecting Function (Mobility)  safety precaution awareness;safety precautions follow-through/compliance  -     Impairments Affecting Function (Mobility)  endurance/activity tolerance  -       User Key  (r) = Recorded By, (t) = Taken By, (c) = Cosigned By    Initials Name Provider Type     Emmy Reagan Occupational Therapist        Mobility/ADL's     Row Name 03/01/21 1227          Bed Mobility    Supine-Sit [place] (Bed Mobility)  modified independence  -     Assistive Device (Bed Mobility)  head of bed elevated  -     Row Name 03/01/21 1227          Transfers    Transfers  sit-stand transfer  -     Sit-Stand [place] (Transfers)  supervision  -[facility] Name 03/01/21 1227          Sit-Stand Transfer    Assistive Device (Sit-Stand Transfers)  cane, straight  -[facility] Name 03/01/21 1227          Functional Mobility    Functional Mobility- Ind. Level  contact guard assist  -     Functional Mobility- Device  straight cane  -     Functional Mobility-Distance (Feet)  144  -     Functional Mobility- Comment  Pt c/o light headedness when walking.  -     Row Name 03/01/21 1227          Activities of Daily Living    BADL Assessment/Intervention  bathing;upper body dressing;lower body dressing;grooming;feeding;toileting  -[facility] Name 03/01/21 1227          Bathing Assessment/Intervention    [place] Level (Bathing)  set up  -[facility] Name 03/01/21 1227          Upper Body Dressing Assessment/Training    [place] Level (Upper Body Dressing)  independent  -[facility] Name 03/01/21 1227          Lower Body Dressing Assessment/Training    [place] Level (Lower Body Dressing)  independent  -[facility] Name 03/01/21 1227          Grooming Assessment/Training    [place] Level (Grooming)  independent  -[facility] Name 03/01/21  1227          Self-Feeding Assessment/Training    [place] Level (Feeding)  independent  -[facility] Name 03/01/21 1227          Toileting Assessment/Training    [place] Level (Toileting)  independent  -       User Key  (r) = Recorded By, (t) = Taken By, (c) = Cosigned By    Initials Name Provider Type    Emmy Scott Occupational Therapist        Obj/Interventions     [facility] Name 03/01/21 1230          Sensory Assessment (Somatosensory)    Sensory Assessment (Somatosensory)  sensation intact  -AH     Row Name 03/01/21 1230          Vision Assessment/Intervention    Visual Impairment/Limitations  blurry vision pt reports B blurred vision  -AH     Row Name 03/01/21 1230          Range of Motion Comprehensive    General Range of Motion  bilateral upper extremity ROM WFL  -AH     Row Name 03/01/21 1230          Strength Comprehensive (MMT)    Comment, General Manual Muscle Testing (MMT) Assessment  BUE WFL, minimal decrease R  strength noted  -       User Key  (r) = Recorded By, (t) = Taken By, (c) = Cosigned By    Initials Name Provider Type     Emmy Reagan Occupational Therapist        Goals/Plan    No documentation.       Clinical Impression     [facility] Name 03/01/21 1232          Pain Assessment    Additional Documentation  Pain Scale: Numbers Pre/Post-Treatment (Group)  -AH     Row Name 03/01/21 1232          Pain Scale: Numbers Pre/Post-Treatment    Pretreatment Pain Rating  4/10  -     Posttreatment Pain Rating  4/10  -     Pain Location - Orientation  lower  -     Pain Location  back  -     Pain Intervention(s)  Repositioned;Ambulation/increased activity  -AH     Row Name 03/01/21 1232          Plan of Care Review    Plan of Care Reviewed With  patient  -     Progress  no change  -     Outcome Summary  Pt seen for OT evaluation today.  Pt able to complete his self care tasks without difficulty.  Pt walked 144' using st. cane and cga.  Pt did c/o feeling light headed while  walking.  pt returned to his room and was left sitting in his chair with call light within reach.  Pt has no skilled OT needs at this time.  -[facility] Name 03/01/21 1232          Therapy Assessment/Plan (OT)    Patient/Family Therapy Goal Statement (OT)  d/c home  -     Criteria for Skilled Therapeutic Interventions Met (OT)  no problems identified which require skilled intervention  -     Therapy Frequency (OT)  evaluation only  -     Row Name 03/01/21 1232          Therapy Plan Review/Discharge Plan (OT)    Anticipated Discharge Disposition (OT)  home  -     Row Name 03/01/21 1232          Vital Signs    Pre Systolic BP Rehab  136  -AH     Pre Treatment Diastolic BP  62  -AH     Intra Systolic BP Rehab  130  -AH     Intra Treatment Diastolic BP  75  -AH     Post Systolic BP Rehab  120  -AH     Post Treatment Diastolic BP  68  -AH     [facility] Name 03/01/21 1232          Positioning and Restraints    Pre-Treatment Position  in bed  -AH     Post Treatment Position  chair  -     In Chair  reclined;call light within reach;encouraged to call for assist  -       User Key  (r) = Recorded By, (t) = Taken By, (c) = Cosigned By    Initials Name Provider Type    Emmy Scott Occupational Therapist        Outcome Measures     Row Name 03/01/21 1249          How much help from another is currently needed...    Putting on and taking off regular lower body clothing?  4  -AH     Bathing (including washing, rinsing, and drying)  3  -AH     Toileting (which includes using toilet bed pan or urinal)  4  -AH     Putting on and taking off regular upper body clothing  4  -AH     Taking care of personal grooming (such as brushing teeth)  4  -AH     Eating meals  4  -AH     AM-PAC 6 Clicks Score (OT)  23  -[facility] Name 03/01/21 1249          Functional Assessment    Outcome Measure Options  AM-PAC 6 Clicks Daily Activity (OT)  -       User Key  (r) = Recorded By, (t) = Taken By, (c) = Cosigned By    Initials Name  Provider Type    Emmy Scott Occupational Therapist        Occupational Therapy Education                 Title: PT OT SLP Therapies (In Progress)     Topic: Occupational Therapy (Done)     Point: ADL training (Done)     Description:   Instruct learner(s) on proper safety adaptation and remediation techniques during self care or transfers.   Instruct in proper use of assistive devices.              Learning Progress Summary           Patient Acceptance, E,TB, VU by  at 3/1/2021 1250    Comment: Role of OT                               User Key     Initials Effective Dates Name Provider Type [facility] 03/07/18 -  Emmy Reagan Occupational Therapist OT              OT Recommendation and Plan  Retired Outcome Summary/Treatment Plan (OT)  Anticipated Discharge Disposition (OT): home  Therapy Frequency (OT): evaluation only  Plan of Care Review  Plan of Care Reviewed With: patient  Progress: no change  Outcome Summary: Pt seen for OT evaluation today.  Pt able to complete his self care tasks without difficulty.  Pt walked 144' using st. cane and cga.  Pt did c/o feeling light headed while walking.  pt returned to his room and was left sitting in his chair with call light within reach.  Pt has no skilled OT needs at this time.  Plan of Care Reviewed With: patient  Outcome Summary: Pt seen for OT evaluation today.  Pt able to complete his self care tasks without difficulty.  Pt walked 144' using st. cane and cga.  Pt did c/o feeling light headed while walking.  pt returned to his room and was left sitting in his chair with call light within reach.  Pt has no skilled OT needs at this time.     Time Calculation:   Time Calculation- OT     Row Name 03/01/21 1253             Time Calculation- OT    OT Start Time  1114  -      OT Received On  03/01/21  -        User Key  (r) = Recorded By, (t) = Taken By, (c) = Cosigned By    Initials Name Provider Type    Emmy Scott Occupational Therapist         Therapy Charges for Today     Code Description Service Date Service Provider Modifiers Qty    69906383296 HC OT EVAL LOW COMPLEXITY 3 3/1/2021 Emmy Reagan 1               Emmy Reagan  3/1/2021

## 2021-03-01 NOTE — PROGRESS NOTES
"Adult Nutrition  Assessment/PES    Patient Name:  Denzel Harding  YOB: 1960  MRN: [id number]  Admit Date:  2/27/2021    Assessment Date:  3/1/2021    Comments:    Recommend:  1. Continue current diet order as medically appropriate and tolerated.  2. Continue to encourage PO intake. Current intake 95% x 5 meals.   3. Consider a multivitamin with minerals daily.  4. Continue to monitor and replace electrolytes PRN     RD to follow pt and available PRN.        Reason for Assessment     Row Name 03/01/21 1019          Reason for Assessment    Reason For Assessment  identified at risk by screening criteria;diagnosis/disease state     Diagnosis  diabetes diagnosis/complications;neurologic conditions Stroke, DM 2, ride sided numbness     Identified At Risk by Screening Criteria  BMI           Anthropometrics     Row Name 03/01/21 1022          Anthropometrics    Height  177.8 cm (70\")        Ideal Body Weight (IBW)    Ideal Body Weight (IBW) (kg)  76.48         Labs/Tests/Procedures/Meds     Row Name 03/01/21 1021          Labs/Procedures/Meds    Lab Results Reviewed  reviewed, pertinent     Lab Results Comments  High: glucose, triglycerides, ALT Low: Na        Medications    Pertinent Medications Reviewed  reviewed, pertinent     Pertinent Medications Comments  Lipitor, Novolog, Levemir, Protonix         Physical Findings     Row Name 03/01/21 1022          Physical Findings    Overall Physical Appearance  obese         Estimated/Assessed Needs     Row Name 03/01/21 1022          Calculation Measurements    Weight Used For Calculations  88 kg (194 lb 0.1 oz) adjusted BW     Height  177.8 cm (70\")        Estimated/Assessed Needs    Additional Documentation  Calorie Requirements (Group);Protein Requirements (Group);Ade-St. Ferrer Equation (Group);Fluid Requirements (Group)        Calorie Requirements    Estimated Calorie Need Method  Piero Ferrer     Estimated Calorie Requirement Comment  [id number]     "    Lincroft-St. Jeor Equation    RMR (Lincroft-St. Jeor Equation)  1701.25        Protein Requirements    Weight Used For Protein Calculations  88 kg (194 lb 0.1 oz) adjusted BW     Est Protein Requirement Amount (gms/kg)  1.0 gm protein  grams     Estimated Protein Requirements (gms/day)  88        Fluid Requirements    Estimated Fluid Requirement Method  Maryan-Segar Formula     Maryan-Segar Method (over 20 kg)  3260         Nutrition Prescription Ordered     Row Name 03/01/21 1026          Nutrition Prescription PO    Current PO Diet  Regular     Common Modifiers  Consistent Carbohydrate         Evaluation of Received Nutrient/Fluid Intake     Row Name 03/01/21 1027          PO Evaluation    Number of Days PO Intake Evaluated  2 days     Number of Meals  5     % PO Intake  95               Problem/Interventions:  Problem 1     Row Name 03/01/21 1028          Nutrition Diagnoses Problem 1    Problem 1  Overweight/Obesity     Etiology (related to)  Factors Affecting Nutrition     Food Habit/Preferences  Large Meals     Signs/Symptoms (evidenced by)  BMI     BMI  35 - 39.9         Problem 2     Row Name 03/01/21 1028          Nutrition Diagnoses Problem 2    Problem 2  Altered Nutrition Related to Labs     Etiology (related to)  Medical Diagnosis     Endocrine  DM Type 2     Signs/Symptoms (evidenced by)  Biochemical     Specific Labs Noted  Glucose;Triglyceride;Hgb & Hct             Intervention Goal     Row Name 03/01/21 1029          Intervention Goal    General  Improved nutrition related lab(s);Meet nutritional needs for age/condition     PO  Maintain intake;Meet estimated needs     Weight  No significant weight loss         Nutrition Intervention     Row Name 03/01/21 1029          Nutrition Intervention    RD/Tech Action  Follow Tx progress;Encourage intake         Nutrition Prescription     Row Name 03/01/21 1029          Nutrition Prescription PO    PO Prescription  Other (comment) Contiue current  diet order as medically appropriate and tolerated     New PO Prescription Ordered?  No, recommended        Other Orders    Obtain Weight  Daily     Obtain Weight Ordered?  No, recommended     Supplement  Vitamin mineral supplement     Supplement Ordered?  No, recommended     Other  Continue to monitor and replace electrolytes PRN         Education/Evaluation     Row Name 03/01/21 1031          Education    Education  Will Instruct as appropriate        Monitor/Evaluation    Monitor  Per protocol;I&O;PO intake;Pertinent labs;Weight;Skin status           Electronically signed by:  Janny Clinton RD  03/01/21 10:31 EST

## 2021-03-01 NOTE — PROGRESS NOTES
SUBJECTIVE:  patient c/o R LE paresthetic numbness worsened by ambulation, no effective of gabapentin;  nursing otherwise without note;  patient ambulatory and eating    OBJECTIVE:  AF VSS  Awake, alert, attentive, NAD;  comprehends following Vox3, produces normally and appropriately, no dysarthria  EOMI, no N, gaze conjugate and crossing midline  Face symmetric, tongue midline, shrug 5/5  Strength 5/5 throughout except R LE 4/5  Sensation reduced to touch R F-A-L  No jerking, tremor, dyscoordination  Stance stable, ambulates slow with R limp using cane    LABS:  CTA head nonsignificant atherosclerosis    ASSESSMENT:  61 yo M with multiple ischemic strokes    RECOMMENDATIONS  1) Continue present management  2) Check orthostatics  3) Obtain outside hospital medical records, specifically hypercoaguability study testing  4) Outpatient  5) Continue gabapentin 600mg tid  6) Continue amitryptiline, plan for weekly dose increases of 10mg  7) Check transesophageal echocardiogram  8) Plan for follow-up 3/2

## 2021-03-01 NOTE — PLAN OF CARE
Goal Outcome Evaluation:  Plan of Care Reviewed With: patient  Progress: no change  Outcome Summary: Pt seen for OT evaluation today.  Pt able to complete his self care tasks without difficulty.  Pt walked 144' using st. cane and cga.  Pt did c/o feeling light headed while walking.  pt returned to his room and was left sitting in his chair with call light within reach.  Pt has no skilled OT needs at this time.

## 2021-03-01 NOTE — THERAPY TREATMENT NOTE
Patient Name: Denzel Harding  : 1960    MRN: [id number]                              Today's Date: 3/1/2021       Admit Date: 2021    Visit Dx:     ICD-10-CM ICD-9-CM   1. Right sided numbness  R20.0 782.0   2. Slurred speech  R47.81 784.59   3. Cerebrovascular accident (CVA), unspecified mechanism (CMS/[facility])  I63.9 434.91     Patient Active Problem List   Diagnosis   • Coronary arteriosclerosis in native artery   • Lumbar radiculopathy   • Hypercholesterolemia   • Diabetic polyneuropathy associated with type 2 diabetes mellitus (CMS/[facility])   • Migraine with aura and with status migrainosus   • Palpitations   • GERD (gastroesophageal reflux disease)   • Brachial neuritis   • Cervical radiculopathy   • LOM (loss of memory)   • Anxiety   • Diabetes mellitus (CMS/[facility])   • Lower back pain   • Essential hypertension   • History of CVA (cerebrovascular accident)   • Non morbid obesity due to excess calories   • Post-infarction pericarditis (CMS/[facility])   • HCAP (healthcare-associated pneumonia)   • Acute CVA (cerebrovascular accident) (CMS/[facility])   • Acute bronchitis   • Type 2 diabetes mellitus (CMS/[facility])   • Headache syndrome, complicated   • Chronic intractable headache   • Obstructive sleep apnea syndrome   • Acute exacerbation of asthma with allergic rhinitis   • Asthma   • Thrush   • Acute diastolic CHF (congestive heart failure) (CMS/[facility])   • Chronic diastolic CHF (congestive heart failure) (CMS/[facility])   • COPD (chronic obstructive pulmonary disease) (CMS/[facility])   • JUAN (acute kidney injury) (CMS/[facility])   • Acute sinusitis   • [place] ronnie's stroke   • Vertebrobasilar insufficiency   • Personal history of colonic polyps   • Pre-syncope   • Lactic acidosis   • Orthostatic hypotension   • Morbid obesity (CMS/[facility])   • Primary localized osteoarthrosis of the knee, right   • Tear of medial meniscus of right knee, current   • Edema   • Shortness of breath   • Chest pain   • Hyperlipidemia   • Type 2 diabetes mellitus with  retinopathy, with long-term current use of insulin (CMS/[facility])   • Insulin long-term use (CMS/[facility])   • Right sided numbness   • Polypharmacy     Past Medical History:   Diagnosis Date   • Anxiety    • Arthritis    • Asthma    • Brachial neuritis 1/19/2017   • Cellulitis     left arm and right leg    • Chest pain    • CHF (congestive heart failure) (CMS/[facility])     Patient reported history May 2020    • COPD (chronic obstructive pulmonary disease) (CMS/[facility])    • Coronary artery disease     Patient reported CABG , 3 vessel   • Depression    • Diabetes mellitus (CMS/[facility])     diagnosed in 1998, checks fsbg 3-4x/day   • Dizziness    • Edema    • Elevated cholesterol    • GERD (gastroesophageal reflux disease)    • H/O chest x-ray 07/04/2015    Mild Left base atelectasis   • H/O echocardiogram 07/05/2015    Normal LVSF. EF of 60-65%. Grade 1 diastolic dysfunction of the LV myocardium. No evidence of pericardial effusion   • H/O exercise stress test    • Hearing loss     No use of hearing aids   • History of fracture     Reported 2 bones in left foot and a finger   • History of herniated intervertebral disc     History of left L5-S1 disc herniation   • History of pneumonia    • History of pneumonia    • Hyperlipidemia    • Hypertension    • Impaired functional mobility, balance, gait, and endurance    • LOM (loss of memory) 1/19/2017   • Lower back pain     Right   • Measles    • MUSE (nonalcoholic steatohepatitis)    • Neuropathy     feet    • EDD treated with BiPAP     BiPAP HS - instructed to bring mask/machine DOS (setting 13 and 5)   • Palpitations    • Pericardial effusion    • Poor dentition     Patient reported missing multiple teeth   • Renal disorder    • Seizure disorder (CMS/[facility])     focal seizures   • SOB (shortness of breath)    • Staph infection     back after sugery at the incision site    • Stroke (CMS/[facility])     x2 most recent , slight weakness in hands occasionaly    • Wears glasses      Past  Surgical History:   Procedure Laterality Date   • BACK SURGERY     • CARDIAC CATHETERIZATION     • CARDIAC CATHETERIZATION N/A 8/11/2017    Procedure: Coronary angiography;  Surgeon: Dallas Hernandez MD;  Location: [facility] CATH INVASIVE LOCATION;  Service:    • CARDIAC CATHETERIZATION N/A 8/11/2017    Procedure: Left Heart Cath;  Surgeon: Dallas Hernandez MD;  Location: [facility] CATH INVASIVE LOCATION;  Service:    • CARDIAC CATHETERIZATION N/A 8/11/2017    Procedure: Left ventriculography;  Surgeon: Dallas Hernandez MD;  Location: [facility] CATH INVASIVE LOCATION;  Service:    • CARDIAC CATHETERIZATION      2002 x1 stent,  x1 stent   • CARDIOVASCULAR STRESS TEST  07/03/2017    WITH DR HERNANDEZ AT [facility]   • CARPAL TUNNEL RELEASE Right    • CATARACT EXTRACTION W/ INTRAOCULAR LENS IMPLANT Right 6/12/2017    Procedure: CATARACT PHACO EXTRACTION WITH INTRAOCULAR LENS IMPLANT RIGHT ;  Surgeon: Natalie Cao MD;  Location: [facility] OR;  Service:    • CATARACT EXTRACTION W/ INTRAOCULAR LENS IMPLANT Left 7/10/2017    Procedure: CATARACT PHACO EXTRACTION WITH INTRAOCULAR LENS IMPLANT LEFT;  Surgeon: Natalie Cao MD;  Location: [facility] OR;  Service:    • CHOLECYSTECTOMY     • COLONOSCOPY     • COLONOSCOPY N/A 7/2/2020    Procedure: COLONOSCOPY;  Surgeon: Petra Crowell MD;  Location: [facility] ENDOSCOPY;  Service: Gastroenterology;  Laterality: N/A;  poor prep   • CORONARY ANGIOPLASTY WITH STENT PLACEMENT      X1-LAD   • CORONARY ARTERY BYPASS GRAFT N/A 8/15/2017    Procedure: CORONARY ARTERY BYPASS GRAFTING x 3 UTILIZING THE LEFT INTERNAL MAMMARY ARTERY WITH ENDOSCOPIC VEIN HARVESTING OF THE RIGHT GREATER SAPHENOUS VEIN, HAMLET, LAD ENDARECTOMY;  Surgeon: London Damon MD;  Location: [facility] OR;  Service:    • ENDOSCOPY     • EYE CAPSULOTOMY WITH LASER Right 11/25/2019    Procedure: EYE CAPSULOTOMY WITH LASER RIGHT;  Surgeon: Natalie Cao MD;  Location: [facility] OR;  Service: Ophthalmology   • EYE CAPSULOTOMY WITH LASER  Left 12/9/2019    Procedure: EYE CAPSULOTOMY WITH LASER LEFT;  Surgeon: Natalie Cao MD;  Location:  [facility] OR;  Service: Ophthalmology   • EYE SURGERY      cataract surgery both eyes   • INTERVENTIONAL RADIOLOGY PROCEDURE Bilateral 12/31/2019    Procedure: Carotid Cerebral Angiogram;  Surgeon: Damian Dubois MD;  Location:  [facility] CATH INVASIVE LOCATION;  Service: Interventional Radiology   • KNEE ARTHROSCOPY Bilateral    • KNEE ARTHROSCOPY Right 2010    Dr Lewis   • KNEE ARTHROSCOPY Left 2008    Dr Jameson   • KNEE MENISCECTOMY Right 10/15/2020    Procedure: Right knee arthroscopy with partial medial and lateral meniscectomy and three compartment chondroplasty.;  Surgeon: South Lewis MD;  Location:  [facility] OR;  Service: Orthopedics;  Laterality: Right;   • MOUTH SURGERY      Oral extractions   • NEUROPLASTY / TRANSPOSITION ULNAR NERVE AT ELBOW Left    • OTHER SURGICAL HISTORY      Foraminotomy and discectomy   • PERICARDIAL WINDOW N/A 8/25/2017    Procedure: PERICARDIAL WINDOW;  Surgeon: Freeman Phillips MD;  Location:  [facility] OR;  Service:      General Information     Row Name 03/01/21 1121          Physical Therapy Time and Intention    Document Type  therapy note (daily note)  -     Mode of Treatment  physical therapy  -     Row Name 03/01/21 1121          General Information    Patient Profile Reviewed  yes  -       User Key  (r) = Recorded By, (t) = Taken By, (c) = Cosigned By    Initials Name Provider Type    JR Zeinab Angulo, PT Physical Therapist        Mobility     Row Name 03/01/21 1121          Bed Mobility    Bed Mobility  supine-sit;sit-supine  -JR     Supine-Sit [place] (Bed Mobility)  modified independence  -     Sit-Supine [place] (Bed Mobility)  modified independence  -     Assistive Device (Bed Mobility)  head of bed elevated  -     Row Name 03/01/21 1121          Sit-Stand Transfer    Sit-Stand [place] (Transfers)  supervision  -     Assistive Device (Sit-Stand  Transfers)  cane, straight  -JR     Row Name 03/01/21 1121          Gait/Stairs (Locomotion)    [place] Level (Gait)  contact guard  -JR     Assistive Device (Gait)  cane, straight  -JR     Distance in Feet (Gait)  220  -JR     Deviations/Abnormal Patterns (Gait)  base of support, wide;stride length decreased;festinating/shuffling  -JR     Bilateral Gait Deviations  forward flexed posture;heel strike decreased  -JR       User Key  (r) = Recorded By, (t) = Taken By, (c) = Cosigned By    Initials Name Provider Type    Zeinab Stein PT Physical Therapist        Obj/Interventions     Row Name 03/01/21 1121          Balance    Static Sitting Balance  WFL  -JR     Dynamic Sitting Balance  WFL  -JR     Static Standing Balance  WFL  -JR     Dynamic Standing Balance  WFL;mild impairment became lightheaded  -JR     Balance Interventions  sit to stand;standing;supported;static;dynamic;minimal challenge;weight shifting activity;single limb stance  -JR       User Key  (r) = Recorded By, (t) = Taken By, (c) = Cosigned By    Initials Name Provider Type    Zeinab Stein PT Physical Therapist        Goals/Plan    No documentation.       Clinical Impression     Row Name 03/01/21 1121          Pain    Additional Documentation  Pain Scale: Numbers Pre/Post-Treatment (Group)  -     Row Name 03/01/21 1121          Pain Scale: Numbers Pre/Post-Treatment    Pretreatment Pain Rating  4/10  -JR     Posttreatment Pain Rating  4/10  -JR     Pain Location - Orientation  upper  -     Pain Location  back  -     Pain Intervention(s)  Ambulation/increased activity;Repositioned  -     Row Name 03/01/21 1121          Plan of Care Review    Plan of Care Reviewed With  patient  -JR     Progress  improving  -     Outcome Summary  Patient is able to perform mobility tasks with no more than CGA.  He becomes lightheaded during gait.  His vital signs were taken and found to be withing normal limits.  He feels fine once he is seated for  a few minutes.  He is expected to benefit from additional PT servcies.  -JR     Row Name 03/01/21 1121          Positioning and Restraints    Pre-Treatment Position  in bed  -JR     Post Treatment Position  chair  -JR     In Chair  sitting;call light within reach;encouraged to call for assist  -JR       User Key  (r) = Recorded By, (t) = Taken By, (c) = Cosigned By    Initials Name Provider Type    Zeinab Stein PT Physical Therapist        Outcome Measures     Row Name 03/01/21 1121          How much help from another person do you currently need...    Turning from your back to your side while in flat bed without using bedrails?  4  -JR     Moving from lying on back to sitting on the side of a flat bed without bedrails?  3  -JR     Moving to and from a bed to a chair (including a wheelchair)?  3  -JR     Standing up from a chair using your arms (e.g., wheelchair, bedside chair)?  3  -JR     Climbing 3-5 steps with a railing?  2  -JR     To walk in hospital room?  3  -JR     AM-PAC 6 Clicks Score (PT)  18  -JR     Row Name 03/01/21 1121          Functional Assessment    Outcome Measure Options  AM-PAC 6 Clicks Basic Mobility (PT)  -JR       User Key  (r) = Recorded By, (t) = Taken By, (c) = Cosigned By    Initials Name Provider Type    Zeinab Stein, NARAYAN Physical Therapist        Physical Therapy Education                 Title: PT OT SLP Therapies (In Progress)     Topic: Physical Therapy (In Progress)     Point: Mobility training (Done)     Learning Progress Summary           Patient Acceptance, E,TB, VU by  at 2/28/2021 1349    Comment: Role of PT and POC                   Point: Home exercise program (Not Started)     Learner Progress:  Not documented in this visit.          Point: Body mechanics (Done)     Learning Progress Summary           Patient Acceptance, E,TB, VU by  at 3/1/2021 1742    Comment: Posture during walking                   Point: Precautions (Not Started)     Learner Progress:  Not  documented in this visit.                      User Key     Initials Effective Dates Name Provider Type Discipline     04/03/18 -  Zeinab Angulo, PT Physical Therapist PT              PT Recommendation and Plan  Planned Therapy Interventions (PT): strengthening, balance training, bed mobility training, transfer training, gait training, patient/family education  Plan of Care Reviewed With: patient  Progress: improving  Outcome Summary: Patient is able to perform mobility tasks with no more than CGA.  He becomes lightheaded during gait.  His vital signs were taken and found to be withing normal limits.  He feels fine once he is seated for a few minutes.  He is expected to benefit from additional PT servcies.     Time Calculation:   PT Charges     Row Name 03/01/21 1757             Time Calculation    Start Time  1121  -      Stop Time  1200  -      Time Calculation (min)  39 min  -      PT Received On  03/01/21  -      PT Goal Re-Cert Due Date  03/10/21  -        User Key  (r) = Recorded By, (t) = Taken By, (c) = Cosigned By    Initials Name Provider Type     Zeinab Angulo, PT Physical Therapist        Therapy Charges for Today     Code Description Service Date Service Provider Modifiers Qty    96780673731 HC PT EVAL MOD COMPLEXITY 4 2/28/2021 Zeinab Angulo, PT GP 1    07123678544 HC GAIT TRAINING EA 15 MIN 3/1/2021 Zeinab Angulo, PT GP 2    13482372195 HC PT THERAPEUTIC ACT EA 15 MIN 3/1/2021 Zeinab Angulo, PT GP 1          PT G-Codes  Outcome Measure Options: AM-PAC 6 Clicks Daily Activity (OT)  AM-PAC 6 Clicks Score (PT): 18  AM-PAC 6 Clicks Score (OT): 23    Zeinab Angulo PT  3/1/2021

## 2021-03-01 NOTE — PLAN OF CARE
Goal Outcome Evaluation:        Outcome Summary: Pt resting well this shift.  Wears home cpap while sleeping.  NIH stroke score 1 this shift.  FSBS with insulin coverage per orders.  VSS.

## 2021-03-02 ENCOUNTER — READMISSION MANAGEMENT (OUTPATIENT)
Dept: CALL CENTER | Facility: HOSPITAL | Age: 61
End: 2021-03-02

## 2021-03-02 ENCOUNTER — TELEPHONE (OUTPATIENT)
Dept: NEUROLOGY | Facility: CLINIC | Age: 61
End: 2021-03-02

## 2021-03-02 ENCOUNTER — APPOINTMENT (OUTPATIENT)
Dept: MRI IMAGING | Facility: HOSPITAL | Age: 61
End: 2021-03-02

## 2021-03-02 ENCOUNTER — APPOINTMENT (OUTPATIENT)
Dept: CARDIOLOGY | Facility: HOSPITAL | Age: 61
End: 2021-03-02

## 2021-03-02 VITALS
DIASTOLIC BLOOD PRESSURE: 65 MMHG | BODY MASS INDEX: 39.8 KG/M2 | SYSTOLIC BLOOD PRESSURE: 127 MMHG | HEIGHT: 70 IN | HEART RATE: 87 BPM | OXYGEN SATURATION: 97 % | WEIGHT: 278 LBS | TEMPERATURE: 97.9 F | RESPIRATION RATE: 20 BRPM

## 2021-03-02 LAB
BH CV ECHO MEAS - % IVS THICK: 2.5 %
BH CV ECHO MEAS - % LVPW THICK: 2.7 %
BH CV ECHO MEAS - AO MAX PG (FULL): 2.7 MMHG
BH CV ECHO MEAS - AO MAX PG: 5 MMHG
BH CV ECHO MEAS - AO MEAN PG (FULL): 2 MMHG
BH CV ECHO MEAS - AO MEAN PG: 3 MMHG
BH CV ECHO MEAS - AO ROOT AREA (BSA CORRECTED): 1.5
BH CV ECHO MEAS - AO ROOT AREA: 10.1 CM^2
BH CV ECHO MEAS - AO ROOT DIAM: 3.6 CM
BH CV ECHO MEAS - AO V2 MAX: 111 CM/SEC
BH CV ECHO MEAS - AO V2 MEAN: 78.9 CM/SEC
BH CV ECHO MEAS - AO V2 VTI: 22.4 CM
BH CV ECHO MEAS - AVA(I,A): 3.7 CM^2
BH CV ECHO MEAS - AVA(I,D): 3.7 CM^2
BH CV ECHO MEAS - AVA(V,A): 3.4 CM^2
BH CV ECHO MEAS - AVA(V,D): 3.4 CM^2
BH CV ECHO MEAS - BSA(HAYCOCK): 2.6 M^2
BH CV ECHO MEAS - BSA(HAYCOCK): 2.6 M^2
BH CV ECHO MEAS - BSA: 2.4 M^2
BH CV ECHO MEAS - BSA: 2.4 M^2
BH CV ECHO MEAS - BZI_BMI: 39.9 KILOGRAMS/M^2
BH CV ECHO MEAS - BZI_BMI: 39.9 KILOGRAMS/M^2
BH CV ECHO MEAS - BZI_METRIC_HEIGHT: 177.8 CM
BH CV ECHO MEAS - BZI_METRIC_HEIGHT: 177.8 CM
BH CV ECHO MEAS - BZI_METRIC_WEIGHT: 126.1 KG
BH CV ECHO MEAS - BZI_METRIC_WEIGHT: 126.1 KG
BH CV ECHO MEAS - EDV(CUBED): 75.2 ML
BH CV ECHO MEAS - EDV(MOD-SP2): 105 ML
BH CV ECHO MEAS - EDV(MOD-SP4): 87.6 ML
BH CV ECHO MEAS - EDV(TEICH): 79.5 ML
BH CV ECHO MEAS - EF(CUBED): 62.6 %
BH CV ECHO MEAS - EF(MOD-BP): 53.4 %
BH CV ECHO MEAS - EF(MOD-SP2): 63.4 %
BH CV ECHO MEAS - EF(MOD-SP4): 44.1 %
BH CV ECHO MEAS - EF(TEICH): 54.5 %
BH CV ECHO MEAS - ESV(CUBED): 28.1 ML
BH CV ECHO MEAS - ESV(MOD-SP2): 38.4 ML
BH CV ECHO MEAS - ESV(MOD-SP4): 49 ML
BH CV ECHO MEAS - ESV(TEICH): 36.2 ML
BH CV ECHO MEAS - FS: 28 %
BH CV ECHO MEAS - IVS/LVPW: 1.1
BH CV ECHO MEAS - IVSD: 1.6 CM
BH CV ECHO MEAS - IVSS: 1.6 CM
BH CV ECHO MEAS - LA DIMENSION: 4.7 CM
BH CV ECHO MEAS - LA/AO: 1.3
BH CV ECHO MEAS - LAD MAJOR: 6.9 CM
BH CV ECHO MEAS - LAT PEAK E' VEL: 5.8 CM/SEC
BH CV ECHO MEAS - LATERAL E/E' RATIO: 12
BH CV ECHO MEAS - LV DIASTOLIC VOL/BSA (35-75): 36.5 ML/M^2
BH CV ECHO MEAS - LV MASS(C)D: 264.4 GRAMS
BH CV ECHO MEAS - LV MASS(C)DI: 110.1 GRAMS/M^2
BH CV ECHO MEAS - LV MASS(C)S: 177.4 GRAMS
BH CV ECHO MEAS - LV MASS(C)SI: 73.9 GRAMS/M^2
BH CV ECHO MEAS - LV MAX PG: 2.3 MMHG
BH CV ECHO MEAS - LV MEAN PG: 1 MMHG
BH CV ECHO MEAS - LV SYSTOLIC VOL/BSA (12-30): 20.4 ML/M^2
BH CV ECHO MEAS - LV V1 MAX: 75.5 CM/SEC
BH CV ECHO MEAS - LV V1 MEAN: 47.5 CM/SEC
BH CV ECHO MEAS - LV V1 VTI: 16.9 CM
BH CV ECHO MEAS - LVIDD: 4.2 CM
BH CV ECHO MEAS - LVIDS: 3 CM
BH CV ECHO MEAS - LVLD AP2: 7.7 CM
BH CV ECHO MEAS - LVLD AP4: 7.4 CM
BH CV ECHO MEAS - LVLS AP2: 6.3 CM
BH CV ECHO MEAS - LVLS AP4: 7 CM
BH CV ECHO MEAS - LVOT AREA (M): 5 CM^2
BH CV ECHO MEAS - LVOT AREA: 4.9 CM^2
BH CV ECHO MEAS - LVOT DIAM: 2.5 CM
BH CV ECHO MEAS - LVPWD: 1.5 CM
BH CV ECHO MEAS - LVPWS: 1.5 CM
BH CV ECHO MEAS - MED PEAK E' VEL: 6 CM/SEC
BH CV ECHO MEAS - MEDIAL E/E' RATIO: 11.6
BH CV ECHO MEAS - MV A MAX VEL: 86.9 CM/SEC
BH CV ECHO MEAS - MV DEC TIME: 0.19 SEC
BH CV ECHO MEAS - MV E MAX VEL: 69.2 CM/SEC
BH CV ECHO MEAS - MV E/A: 0.8
BH CV ECHO MEAS - MV MAX PG: 4 MMHG
BH CV ECHO MEAS - MV MEAN PG: 2 MMHG
BH CV ECHO MEAS - MV V2 MAX: 100 CM/SEC
BH CV ECHO MEAS - MV V2 MEAN: 62 CM/SEC
BH CV ECHO MEAS - MV V2 VTI: 23 CM
BH CV ECHO MEAS - MVA(VTI): 3.6 CM^2
BH CV ECHO MEAS - PA ACC TIME: 0.13 SEC
BH CV ECHO MEAS - PA MAX PG (FULL): 3.3 MMHG
BH CV ECHO MEAS - PA MAX PG: 4.8 MMHG
BH CV ECHO MEAS - PA MEAN PG (FULL): 1 MMHG
BH CV ECHO MEAS - PA MEAN PG: 2 MMHG
BH CV ECHO MEAS - PA PR(ACCEL): 21.9 MMHG
BH CV ECHO MEAS - PA V2 MAX: 110 CM/SEC
BH CV ECHO MEAS - PA V2 MEAN: 65.4 CM/SEC
BH CV ECHO MEAS - PA V2 VTI: 18.1 CM
BH CV ECHO MEAS - RV MAX PG: 1.6 MMHG
BH CV ECHO MEAS - RV MEAN PG: 1 MMHG
BH CV ECHO MEAS - RV V1 MAX: 62.8 CM/SEC
BH CV ECHO MEAS - RV V1 MEAN: 41.5 CM/SEC
BH CV ECHO MEAS - RV V1 VTI: 15.2 CM
BH CV ECHO MEAS - SI(AO): 93.9 ML/M^2
BH CV ECHO MEAS - SI(CUBED): 19.6 ML/M^2
BH CV ECHO MEAS - SI(LVOT): 34.8 ML/M^2
BH CV ECHO MEAS - SI(MOD-SP2): 27.7 ML/M^2
BH CV ECHO MEAS - SI(MOD-SP4): 16.1 ML/M^2
BH CV ECHO MEAS - SI(TEICH): 18 ML/M^2
BH CV ECHO MEAS - SV(AO): 225.5 ML
BH CV ECHO MEAS - SV(CUBED): 47.1 ML
BH CV ECHO MEAS - SV(LVOT): 83.6 ML
BH CV ECHO MEAS - SV(MOD-SP2): 66.6 ML
BH CV ECHO MEAS - SV(MOD-SP4): 38.6 ML
BH CV ECHO MEAS - SV(TEICH): 43.3 ML
BH CV ECHO MEAS - TAPSE (>1.6): 1.7 CM
BH CV ECHO MEASUREMENTS AVERAGE E/E' RATIO: 11.73
BH CV XLRA - RV BASE: 5.1 CM
BH CV XLRA - RV LENGTH: 7.7 CM
BH CV XLRA - RV MID: 5 CM
BH CV XLRA - TDI S': 12.9 CM/SEC
GLUCOSE BLDC GLUCOMTR-MCNC: 353 MG/DL (ref 70–130)
GLUCOSE BLDC GLUCOMTR-MCNC: 476 MG/DL (ref 70–130)
LV EF 2D ECHO EST: 60 %

## 2021-03-02 PROCEDURE — 25010000002 FENTANYL CITRATE (PF) 100 MCG/2ML SOLUTION: Performed by: INTERNAL MEDICINE

## 2021-03-02 PROCEDURE — 93312 ECHO TRANSESOPHAGEAL: CPT | Performed by: INTERNAL MEDICINE

## 2021-03-02 PROCEDURE — 99217 PR OBSERVATION CARE DISCHARGE MANAGEMENT: CPT | Performed by: INTERNAL MEDICINE

## 2021-03-02 PROCEDURE — 93312 ECHO TRANSESOPHAGEAL: CPT

## 2021-03-02 PROCEDURE — 70551 MRI BRAIN STEM W/O DYE: CPT

## 2021-03-02 PROCEDURE — 63710000001 INSULIN DETEMIR PER 5 UNITS: Performed by: INTERNAL MEDICINE

## 2021-03-02 PROCEDURE — G0378 HOSPITAL OBSERVATION PER HR: HCPCS

## 2021-03-02 PROCEDURE — 25010000002 MIDAZOLAM PER 1 MG: Performed by: INTERNAL MEDICINE

## 2021-03-02 PROCEDURE — 93320 DOPPLER ECHO COMPLETE: CPT

## 2021-03-02 PROCEDURE — 63710000001 INSULIN ASPART PER 5 UNITS: Performed by: EMERGENCY MEDICINE

## 2021-03-02 PROCEDURE — 82962 GLUCOSE BLOOD TEST: CPT

## 2021-03-02 PROCEDURE — 63710000001 INSULIN ASPART PER 5 UNITS: Performed by: INTERNAL MEDICINE

## 2021-03-02 PROCEDURE — 93321 DOPPLER ECHO F-UP/LMTD STD: CPT

## 2021-03-02 PROCEDURE — 94799 UNLISTED PULMONARY SVC/PX: CPT

## 2021-03-02 PROCEDURE — 93325 DOPPLER ECHO COLOR FLOW MAPG: CPT

## 2021-03-02 PROCEDURE — 99214 OFFICE O/P EST MOD 30 MIN: CPT | Performed by: PSYCHIATRY & NEUROLOGY

## 2021-03-02 PROCEDURE — 93325 DOPPLER ECHO COLOR FLOW MAPG: CPT | Performed by: INTERNAL MEDICINE

## 2021-03-02 PROCEDURE — 93321 DOPPLER ECHO F-UP/LMTD STD: CPT | Performed by: INTERNAL MEDICINE

## 2021-03-02 RX ORDER — AMLODIPINE BESYLATE 5 MG/1
5 TABLET ORAL
Qty: 30 TABLET | Refills: 0
Start: 2021-03-02 | End: 2021-04-01

## 2021-03-02 RX ORDER — FENTANYL CITRATE 50 UG/ML
INJECTION, SOLUTION INTRAMUSCULAR; INTRAVENOUS
Status: COMPLETED | OUTPATIENT
Start: 2021-03-02 | End: 2021-03-02

## 2021-03-02 RX ORDER — CHLORTHALIDONE 25 MG/1
12.5 TABLET ORAL DAILY
Qty: 30 TABLET | Refills: 0 | Status: SHIPPED | OUTPATIENT
Start: 2021-03-02 | End: 2021-06-14

## 2021-03-02 RX ORDER — ASPIRIN 325 MG
325 TABLET, DELAYED RELEASE (ENTERIC COATED) ORAL DAILY
Qty: 30 TABLET | Refills: 0 | Status: ON HOLD | OUTPATIENT
Start: 2021-03-03 | End: 2022-01-01

## 2021-03-02 RX ORDER — MIDAZOLAM HYDROCHLORIDE 1 MG/ML
INJECTION INTRAMUSCULAR; INTRAVENOUS
Status: COMPLETED | OUTPATIENT
Start: 2021-03-02 | End: 2021-03-02

## 2021-03-02 RX ADMIN — CLOPIDOGREL BISULFATE 75 MG: 75 TABLET ORAL at 11:19

## 2021-03-02 RX ADMIN — FENTANYL CITRATE 25 MCG: 50 INJECTION INTRAMUSCULAR; INTRAVENOUS at 08:38

## 2021-03-02 RX ADMIN — BUDESONIDE AND FORMOTEROL FUMARATE DIHYDRATE 2 PUFF: 80; 4.5 AEROSOL RESPIRATORY (INHALATION) at 07:13

## 2021-03-02 RX ADMIN — INSULIN ASPART 20 UNITS: 100 INJECTION, SOLUTION INTRAVENOUS; SUBCUTANEOUS at 12:16

## 2021-03-02 RX ADMIN — LISINOPRIL 5 MG: 5 TABLET ORAL at 11:18

## 2021-03-02 RX ADMIN — MIDAZOLAM HYDROCHLORIDE 2 MG: 1 INJECTION, SOLUTION INTRAMUSCULAR; INTRAVENOUS at 08:38

## 2021-03-02 RX ADMIN — CHLORTHALIDONE 12.5 MG: 25 TABLET ORAL at 11:18

## 2021-03-02 RX ADMIN — SPIRONOLACTONE 25 MG: 25 TABLET, FILM COATED ORAL at 11:19

## 2021-03-02 RX ADMIN — ASPIRIN 325 MG: 325 TABLET, COATED ORAL at 11:19

## 2021-03-02 RX ADMIN — GABAPENTIN 300 MG: 300 CAPSULE ORAL at 13:14

## 2021-03-02 RX ADMIN — INSULIN DETEMIR 35 UNITS: 100 INJECTION, SOLUTION SUBCUTANEOUS at 11:20

## 2021-03-02 RX ADMIN — SODIUM CHLORIDE, PRESERVATIVE FREE 10 ML: 5 INJECTION INTRAVENOUS at 09:30

## 2021-03-02 RX ADMIN — INSULIN ASPART 5 UNITS: 100 INJECTION, SOLUTION INTRAVENOUS; SUBCUTANEOUS at 11:20

## 2021-03-02 NOTE — OUTREACH NOTE
Prep Survey      Responses   [facility] patient discharged from?  [place]   Is LACE score < 7 ?  No   Emergency Room discharge w/ pulse ox?  No   Eligibility  [facility] [place]   Date of Admission  02/27/21   Date of Discharge  03/02/21   Discharge Disposition  Home or Self Care   Discharge diagnosis  Acute CVA    Does the patient have one of the following disease processes/diagnoses(primary or secondary)?  Stroke (TIA)   Does the patient have Home health ordered?  No   Is there a DME ordered?  No   Prep survey completed?  Yes          Zeinab Lloyd RN

## 2021-03-02 NOTE — TELEPHONE ENCOUNTER
Caller: LAZARUS    Relationship to patient: NURSE    Best call back number: [phone]    New or established patient?  [x] New  [x] Established    Date of discharge: 3/2    Facility discharged from: [facility]    Diagnosis/Symptoms: STROKE    Length of stay (If applicable): 3 DAYS     Specialty Only: Did you see a [facility] health provider?    [x] Yes  [] No  If so, who? DR DARIAN WEIR    THE PT IS A PT OF [facility]. THEY WANT THE PT TO FOLLOW UP WITH DR TEE IN 3 WEEKS. THE NURSE ALSO STATED IT WOULD BE ALRIGHT FOR HIM TO FOLLOW UP WITH [facility] AS WELL. CAN THE PATIENT BE SEEN IN 3 WEEKS?

## 2021-03-02 NOTE — PROGRESS NOTES
Stroke Progress Note       Chief Complaint: Right-sided numbness    Subjective    Subjective     Subjective:  No acute issues overnight, patient is feeling better.  Has mild right leg numbness, otherwise symptoms are much better.    Review of Systems   Constitutional: Negative  Eyes: Negative.    Respiratory: Negative.    Cardiovascular: Negative.    Gastrointestinal: Negative.    Endocrine: Negative.    Genitourinary: Negative.    Musculoskeletal: Negative  Allergic/Immunologic: Negative.    Neurological: Positive for numbness  in right leg, much improved  Hematological: Negative.    Psychiatric/Behavioral: Negative.          Objective    Objective      Temp:  [98.1 °F (36.7 °C)-99.1 °F (37.3 °C)] 98.1 °F (36.7 °C)  Heart Rate:  [] 86  Resp:  [16-18] 18  BP: (122-169)/(57-88) 131/67        Neurological Exam  Mental Status  Awake, alert and oriented to person, place and time.Alert. Speech is normal. Language is fluent with no aphasia. Attention and concentration are normal. Fund of knowledge is appropriate for level of education.     Cranial Nerves  CN II: Visual fields full to confrontation.  CN III, IV, VI: Extraocular movements intact bilaterally. Pupils equal round and reactive to light bilaterally.  CN V:  Right:  Facial sensation is normal on the right  Left: Facial sensation is normal on the left.  CN VII: Full and symmetric facial movement.  CN VIII: Equal hearing bilaterally.  CN IX, X: Palate elevates symmetrically  CN XI: Shoulder shrug strength is normal.  CN XII: Tongue midline without atrophy or fasciculations.     Motor  Normal muscle bulk throughout. No fasciculations present.  No focal weakness appreciated in today's exam.   is equal on both sides.     Sensory  Light touch abnormality: Decreased to light touch in right lower extremity, but normal in upper extremities.    Reflexes  Not assessed.     Coordination  No dysmetria.     Gait  Not assessed.        Physical Exam  Vitals signs and  nursing note reviewed.   Constitutional:       Appearance: Normal appearance. He is obese.   HENT:      Head: Normocephalic and atraumatic.      Mouth/Throat:      Mouth: Mucous membranes are moist.      Pharynx: Oropharynx is clear.   Eyes:      Extraocular Movements: Extraocular movements intact.      Conjunctiva/sclera: Conjunctivae normal.      Pupils: Pupils are equal, round, and reactive to light.   Neck:      Musculoskeletal: Normal range of motion and neck supple.   Cardiovascular:      Rate and Rhythm: Normal rate and regular rhythm.   Pulmonary:      Effort: Pulmonary effort is normal. No respiratory distress.   Neurological:      Mental Status: He is alert.      Cranial Nerves: No cranial nerve deficit.      Sensory: Sensory deficit present.      Motor: No weakness.   Psychiatric:         Mood and Affect: Mood normal.         Speech: Speech normal.         Behavior: Behavior normal.     Results Review:    I reviewed the patient's new clinical results.  His glucose is running in 300s to 400s.  HAMLET was done this morning, which was reviewed.  No other new labs or imaging.    Results for orders placed during the hospital encounter of 02/27/21   Adult Transesophageal Echo (HAMLET) W/ Cont if Necessary Per Protocol    Narrative · Estimated left ventricular EF = 60% Left ventricular ejection fraction   appears to be 56 - 60%. Left ventricular systolic function is normal.  · Saline test results are negative.  · The right atrial cavity is small in size.  · There is moderate calcification of the aortic valve mainly affecting the   non-coronary cusp(s).                Assessment/Plan     Assessment/Plan:  60-year-old right-handed white male with known diagnosis of hypertension, uncontrolled diabetes, coronary artery disease status post CABG and stent, strokes x3, with residual penmanship problems, last stroke about 3 weeks ago when he had left-sided numbness, comes in with acute onset of right-sided numbness.  Not a  TPA candidate secondary to recent stroke.        Right-sided numbness.  Possible left thalamic stroke.  Patient has been started on aspirin 325 mg, Plavix 75 mg and Lipitor 80 mg daily for secondary stroke prevention.  Somehow MRI was not ordered, will get one today.  He got HAMLET, which does not show any left atrial size abnormality, normal EF.  Patient stroke symptoms are likely lacunar, from his uncontrolled diabetes.  Essential hypertension.  Normal blood pressure goals.  Continue his blood pressure medications.  Diabetes mellitus type 2 uncontrolled with hyperglycemia.  His last A1c last month was 12.3 in January.  We will need close follow-up for diabetes, and medication adjusted.  May benefit with endocrinology follow-up.  Sliding scale insulin for now.  Morbid obesity.  He is morbidly obese, with BMI of 40.  Encouraged him to lose weight, and exercise regularly.  Coronary artery disease status post stent and CABG.  He does have significant vascular disease, which increases his risk for having strokes.  Continuing dual antiplatelets and full dose statins.  PT/OT has cleared him.  Healthy heart/diabetic diet.        Case was discussed with patient, his wife, nursing, and will call the hospitalist.  All the risk factors were discussed with the patient and appropriate education was given.  Thank you for the consult.                     Brennan Loving MD  03/02/21  11:22 EST    This was an audio and video enabled telemedicine encounter.

## 2021-03-02 NOTE — PLAN OF CARE
Goal Outcome Evaluation:         Discharge education completed with patient and spouse. All questions answered to patient satisfaction. Discharge home with spouse.

## 2021-03-02 NOTE — PLAN OF CARE
Goal Outcome Evaluation:  Plan of Care Reviewed With: patient  Progress: improving  Outcome Summary: No acute events during the night. Vital Signs Stable. No complaints of pain. Patient rested well. Patient NPO for HAMLET. Will continue to monitor and provide care per MD order.

## 2021-03-02 NOTE — DISCHARGE SUMMARY
[facility]   DISCHARGE SUMMARY      Name:  Denzel Harding   Age:  60 y.o.  Sex:  male  :  1960  MRN:  [id number]   Visit Number:  [id number]    Admission Date:  2021  Date of Discharge:  3/2/2021  Primary Care Physician:  Isaura Godoy MD    Important issues to note:    1.  No change has been made to his home medication regimen other than increasing aspirin dose to 325 mg daily.   2.  Patient does have uncontrolled diabetes and would benefit from follow-up with his endocrinologist.  3.  Follow-up with Dr. Guevara (neurology) in 3 to 4 weeks.    Discharge Diagnoses:     1.  Right-sided numbness possibly related to left thalamic lacunar stroke, POA (MRI however was negative).  2.  Diabetes mellitus type 2 with hyperglycemia, POA.  3.  Coronary artery disease status post CABG.  4.  Essential hypertension.  5.  Morbid obesity with a BMI of 40.    Problem List:       Acute CVA (cerebrovascular accident) (CMS/HCC)    Coronary arteriosclerosis in native artery    Diabetic polyneuropathy associated with type 2 diabetes mellitus (CMS/[facility])    Obstructive sleep apnea syndrome    Morbid obesity (CMS/HCC)    Right sided numbness    Polypharmacy    Presenting Problem:    Chief Complaint   Patient presents with   • Numbness      Consults:     Consulting Physician(s)     Provider Relationship Specialty    Ernesto Garduno MD Consulting Physician Neurology        Procedures Performed:    None.    History of presenting illness/Hospital Course:    Mr. Harding is a pleasant 60-year-old male with history of uncontrolled diabetes mellitus type 2, prior history of CVA, coronary artery disease status post CABG and PCI was admitted from the emergency room on 2021 with complaints of right-sided numbness, blurry vision and right-sided weakness.  Patient was evaluated by telemetry neurology.  Since the patient had a recent stroke 3 weeks ago, he was not a candidate for TPA.  CT of the head did not  reveal any acute abnormality.  CT angiogram of the carotids showed significant calcification and atherosclerotic disease without any stenosis.  Neurology recommended to increase his aspirin dose to 325 mg and continue his Plavix.    Patient was admitted to the medical floor with telemetry.  He was seen by physical and occupational therapy and was able to walk without any significant difficulty.  He was seen by speech therapy and they recommended regular diet with thin liquids as tolerated.  He was continued on his home medications.  He does have uncontrolled diabetes and his hemoglobin A1c was 12.  He follows up with endocrinologist in [place] and is also on Ozempic and Toujeo at home.  He is advised to follow-up with his endocrinologist as scheduled.    Due to the patient's recurrent strokes, it was felt that the patient would benefit from transesophageal echocardiogram.  Transthoracic echocardiogram done earlier had shown left ventricular enlargement.  Transesophageal echocardiogram done on 3/1/2021 did not show any evidence of left ventricular thrombus.  Bubble study was negative.  He did have an MRI of the head without contrast on 3/2/2021 which did not show any evidence of acute stroke but showed chronic microvascular disease.  He is currently feeling better and is being discharged home.  He lives with his wife and is independent in daily activities.  He declined home health services.  He already has a rolling walker at home.  He has been advised to follow-up with outpatient neurology in 3 to 4 weeks.  He already has an appointment with his primary care provider in 2 days.    Vital Signs:    Temp:  [98.1 °F (36.7 °C)-99.1 °F (37.3 °C)] 98.4 °F (36.9 °C)  Heart Rate:  [] 87  Resp:  [16-19] 19  BP: (122-169)/(57-88) 147/63    Physical Exam:    General Appearance:  Alert and cooperative, not in any acute distress.   Head:  Atraumatic and normocephalic, without obvious abnormality.   Eyes:           Conjunctivae and sclerae normal, no icterus. No pallor. Extraocular movements are within normal limits.   Ears:  Ears appear intact with no abnormalities noted.   Throat: No oral lesions, no thrush, oral mucosa moist.   Neck: Supple, trachea midline, no thyromegaly, no carotid bruit.   Back:   No kyphoscoliosis present. No tenderness to palpation,   range of motion normal.   Lungs:   Chest shape is normal. Breath sounds heard bilaterally equally.  No crackles or wheezing. No Pleural rub or bronchial breathing.   Heart:  Normal S1 and S2, no murmur, no gallop, no rub. No JVD.  Midline CABG scar noted.   Abdomen:   Normal bowel sounds, no masses, no organomegaly. Soft, nontender, nondistended, no guarding, no rebound tenderness.   Extremities: Supple, 1+ edema, no cyanosis, no clubbing.   Pulses: Pulses palpable and equal bilaterally.   Skin: No bleeding or rash.   Neurologic: Alert and oriented x 3. Moves all four limbs equally.  Hand  is strong bilaterally.  Mild decrease in tactile sensation on the right lower extremity.  No tremors. No facial asymmetry.     Pertinent Lab Results:     Results from last 7 days   Lab Units 03/01/21  0640 02/27/21  1730   SODIUM mmol/L 134* 131*   POTASSIUM mmol/L 4.2 4.3   CHLORIDE mmol/L 98 94*   CO2 mmol/L 27.9 27.2   BUN mg/dL 15 26*   CREATININE mg/dL 0.79 1.08   CALCIUM mg/dL 9.3 9.5   BILIRUBIN mg/dL  --  0.7   ALK PHOS U/L  --  100   ALT (SGPT) U/L  --  48*   AST (SGOT) U/L  --  29   GLUCOSE mg/dL 342* 423*     Results from last 7 days   Lab Units 02/27/21  1730   WBC 10*3/mm3 8.38   HEMOGLOBIN g/dL 13.4   HEMATOCRIT % 40.0   PLATELETS 10*3/mm3 182     Results from last 7 days   Lab Units 02/27/21  1730   INR  1.05     Results from last 7 days   Lab Units 02/27/21  1730   TROPONIN T ng/mL 0.010     Pertinent Radiology Results:    Imaging Results (All)     Procedure Component Value Units Date/Time    MRI Brain Without Contrast [[id number]] Collected: 03/02/21 0114      Updated: 03/02/21 1430    Narrative:      PROCEDURE: MRI BRAIN WO CONTRAST-     HISTORY: CVA; R20.0-Anesthesia of skin; R47.81-Slurred speech;  I63.9-Cerebral infarction, unspecified, right-sided weakness     TECHNIQUE: Multiplanar imaging was performed of the brain without  gadolinium infusion.     Diffusion sequences show no signal abnormalities to indicate acute  infarct or other process.     Periventricular white matter signal changes are present. These are  likely due to moderate chronic microvascular change. Mild atrophy is  noted. There is no evidence of mass or hemorrhage. No extra-axial  abnormal findings are identified. The ventricles and cisterns appear  normal.        Impression:      1. No acute infarct  2. Atrophy and chronic microvascular white matter changes     This report was finalized on 3/2/2021 2:28 PM by Phoenix Bella MD.    CT Angiogram Neck [[id number]] Collected: 02/27/21 1924     Updated: 02/27/21 1925    Narrative:      FINAL REPORT    TECHNIQUE:  Axial images through the neck were obtained following IV  contrast administration.    CLINICAL HISTORY:  stroke right sided numbness    FINDINGS:  The aortic arch and origin of the great vessels are  unremarkable.  Both carotid arteries show significant  calcification at the aortic bifurcation but there is no  significant stenosis.      Impression:      Atherosclerotic calcification at the bifurcations bilaterally  without significant stenosis.    Authenticated by Isaiah Cardoza M.D. on 02/27/2021 07:24:09 PM    CT Head Without Contrast Stroke Protocol [[id number]] Collected: 02/27/21 1910     Updated: 02/27/21 1911    Narrative:      FINAL REPORT    TECHNIQUE:  Routine axial images through the head were obtained without  contrast. This study was performed with techniques to keep  radiation doses as low as reasonably achievable (ALARA).  Individualized dose reduction techniques using automated  exposure control or adjustment of mA and/or kV according to  the  patient's size were employed.    CLINICAL HISTORY:  . stroke protocol    FINDINGS:  The ventricles are mildly dilated, proportionate to the degree  of generalized mild atrophy.  Mild periventricular and deep  white matter low-attenuation areas are consistent with chronic  small vessel ischemia.  There is no mass or shift in midline  structures.  There is no intracranial hemorrhage.  There is no  acute sinus or osseous abnormality.      Impression:      No acute intracranial abnormality.    Authenticated by Isaiah Cardoza M.D. on 02/27/2021 07:10:03 PM    CT Angiogram Head [[id number]] Collected: 02/27/21 1904     Updated: 02/27/21 1905    Narrative:      FINAL REPORT    CLINICAL HISTORY:  stroke, right side numbness    FINDINGS:  The noncontrast CT of the head is unremarkable. The intracranial  portions of the internal carotid arteries are unremarkable. The  anterior and middle cerebral arteries are unremarkable. The  vertebral and basilar arteries are within normal limits.      Impression:      No acute arterial abnormality.    Authenticated by Isaiah Cardoza M.D. on 02/27/2021 07:04:55 PM        Echo:    Results for orders placed during the hospital encounter of 02/27/21   Adult Transesophageal Echo (HAMLET) W/ Cont if Necessary Per Protocol    Narrative · Estimated left ventricular EF = 60% Left ventricular ejection fraction   appears to be 56 - 60%. Left ventricular systolic function is normal.  · Saline test results are negative.  · The right atrial cavity is small in size.  · There is moderate calcification of the aortic valve mainly affecting the   non-coronary cusp(s).      Adult Transthoracic Echo Complete W/ Cont if Necessary Per Protocol (With Agitated Saline)    Narrative 1.  Technically difficult study.   2.  Normal left ventricular size and systolic function, LVEF 55-60%.  3.  Indeterminate LV diastolic filling pattern.  4.  Normal right ventricular size and systolic function.  5.  Moderately increased left  atrial volume index.  6.  Mild calcification of the aortic valve without significant stenosis.  7.  Mild concentric LVH.     Condition on Discharge:      Stable.    Code status during the hospital stay:    Code Status and Medical Interventions:   Ordered at: 02/27/21 2042     Code Status:    CPR     Medical Interventions (Level of Support Prior to Arrest):    Full     Discharge Disposition:    Home or Self Care    Discharge Medications:       Discharge Medications      Changes to Medications      Instructions Start Date   aspirin  MG tablet  What changed:   · medication strength  · how much to take   325 mg, Oral, Daily   Start Date: March 3, 2021        Continue These Medications      Instructions Start Date   albuterol sulfate  (90 Base) MCG/ACT inhaler  Commonly known as: Ventolin HFA   2 puffs, Inhalation, Every 4 Hours PRN      amitriptyline 25 MG tablet  Commonly known as: ELAVIL   75 mg, Oral, Every Night at Bedtime      amLODIPine 5 MG tablet  Commonly known as: NORVASC   Take 1 tablet by mouth Daily      Asmanex  MCG/ACT aerosol  Generic drug: Mometasone Furoate   1 puff, Inhalation, 2 Times Daily      atorvastatin 80 MG tablet  Commonly known as: LIPITOR   80 mg, Oral, Nightly      budesonide-formoterol 80-4.5 MCG/ACT inhaler  Commonly known as: Symbicort   2 puffs, Inhalation, 2 Times Daily - RT, Rinse mouth with water after use.      chlorthalidone 25 MG tablet  Commonly known as: HYGROTON   Take 1/2  tablet by mouth Daily      cloNIDine 0.1 MG tablet  Commonly known as: Catapres   Take 1 tablet by mouth every 6 hours as needed for SBP > 170 or DBP > 100      clopidogrel 75 MG tablet  Commonly known as: PLAVIX   75 mg, Oral, Daily      coenzyme Q10 100 MG capsule   100 mg, Oral, Daily      Cycloset 0.8 MG tablet  Generic drug: Bromocriptine Mesylate   3.2 mg, Oral, Every Morning      dicyclomine 10 MG/5ML syrup  Commonly known as: BENTYL   20 mg, Oral, 3 Times Daily PRN      docusate  sodium 100 MG capsule  Commonly known as: Colace   100 mg, Oral, 2 Times Daily PRN      Dupixent 300 MG/2ML solution prefilled syringe  Generic drug: Dupilumab   INJECT 300 MG SUBCUTANEOUSLY EVERY OTHER WEEK      gabapentin 600 MG tablet  Commonly known as: NEURONTIN   600 mg, Oral, 3 Times Daily      HumaLOG KwikPen 100 UNIT/ML solution pen-injector  Generic drug: Insulin Lispro (1 Unit Dial)   Inject 50 units tid before meals      ipratropium-albuterol 0.5-2.5 mg/3 ml nebulizer  Commonly known as: DUO-NEB   3 mL, Nebulization, 4 Times Daily PRN      levocetirizine 5 MG tablet  Commonly known as: XYZAL   5 mg, Oral, Every Evening      lisinopril 5 MG tablet  Commonly known as: PRINIVIL,ZESTRIL   5 mg, Oral, Daily      meclizine 25 MG tablet  Commonly known as: ANTIVERT   25 mg, Oral, 3 Times Daily PRN      metoprolol succinate  MG 24 hr tablet  Commonly known as: TOPROL-XL   Take 1 tablet by mouth Daily      omeprazole 20 MG capsule  Commonly known as: priLOSEC   20 mg, Oral, Daily      Ozempic (1 MG/DOSE) 2 MG/1.5ML solution pen-injector  Generic drug: Semaglutide (1 MG/DOSE)   1 mg, Subcutaneous, Weekly      promethazine 25 MG tablet  Commonly known as: PHENERGAN   25 mg, Oral, Every 6 Hours PRN      Spiriva Respimat 1.25 MCG/ACT aerosol solution inhaler  Generic drug: Tiotropium Bromide Monohydrate   2 puffs, Inhalation, Daily      spironolactone 25 MG tablet  Commonly known as: ALDACTONE   25 mg, Oral, Daily      tiZANidine 4 MG tablet  Commonly known as: ZANAFLEX   TAKE 1 CAPSULE BY MOUTH AT NIGHT AS NEEDED FOR MUSCLE SPASMS      Toujeo SoloStar 300 UNIT/ML solution pen-injector injection  Generic drug: Insulin Glargine (1 Unit Dial)   125 Units, Subcutaneous, 2 times daily, One month supply      Trintellix 20 MG tablet  Generic drug: Vortioxetine HBr   20 mg, Oral, Daily      TURMERIC PO   500 mg, Oral, 2 Times Daily      vitamin C 500 MG tablet  Commonly known as: ASCORBIC ACID   500 mg, Oral, Daily       vitamin E 400 UNIT capsule   400 Units, Oral, 2 times daily      zafirlukast 20 MG tablet  Commonly known as: Accolate   20 mg, Oral, 2 Times Daily           Discharge Diet:     Diet Instructions     Diet: Cardiac, Consistent Carbohydrate; Thin      Discharge Diet:  Cardiac  Consistent Carbohydrate       Fluid Consistency: Thin        Activity at Discharge:     Activity Instructions     Activity as Tolerated          Follow-up Appointments:    Follow-up Information     Gustavo Guevara MD Follow up in 3 week(s).    Specialty: Neurology  Contact information:  [address]  [address]  [place] [address] [phone]             Isaura Godoy MD .    Specialties: Internal Medicine, Geriatric Medicine  Contact information:  [address]  [address]  [facility] [address] [phone]                 Future Appointments   Date Time Provider Department Center   3/4/2021  2:00 PM Isaura Godoy MD [facility] PC RI MR [facility]   3/10/2021  9:15 AM Dallas Kim MD [facility] CD BG R None   3/24/2021 11:00 AM Linda Barajas MD [facility] PCC JACKELINE None   3/25/2021 11:00 AM Blaine Waters MD [facility] END BM None   6/24/2021 11:30 AM Lynnette Gamble PA-C [facility] N CT GEOFF [facility]   7/29/2021  1:45 PM Petra Crowell MD [facility] GE RICH None     Test Results Pending at Discharge:    None.       Ray Walker MD  03/02/21  14:55 EST    Time: I spent 25 minutes on this discharge activity which included: face-to-face encounter with the patient, reviewing the data in the system, coordination of the care with the nursing staff as well as consultants, documentation, and entering orders.     Dictated utilizing Dragon dictation.

## 2021-03-02 NOTE — TELEPHONE ENCOUNTER
Patient has an appointment with you in June, would you like to still see him for stroke that day or get another provider to see him?

## 2021-03-02 NOTE — PROGRESS NOTES
Continued Stay Note   [facility]     Patient Name: Denzel Harding  MRN: [id number]  Today's Date: 3/2/2021    Admit Date: 2/27/2021    Discharge Plan     Row Name 03/02/21 1400       Plan    Plan  Spoke with Dr Walker, plans to discharge today.  No DC Needs.        Discharge Codes    No documentation.       Expected Discharge Date and Time     Expected Discharge Date Expected Discharge Time    Mar 2, 2021             Jayne Michele RN

## 2021-03-02 NOTE — TELEPHONE ENCOUNTER
If Dr. Guevara is okay with seeing him, I am okay with that. If he is doing well and is comfortable, it may be reasonable for him to be see in June and added to my cancellation list. Whichever he would prefer. Thanks!

## 2021-03-03 ENCOUNTER — TRANSITIONAL CARE MANAGEMENT TELEPHONE ENCOUNTER (OUTPATIENT)
Dept: CALL CENTER | Facility: HOSPITAL | Age: 61
End: 2021-03-03

## 2021-03-03 NOTE — PROGRESS NOTES
Case Management Discharge Note           Provided Post Acute Provider List?: N/A  N/A Provider List Comment: no needs  Provided Post Acute Provider Quality & Resource List?: N/A  N/A Quality & Resource List Comment: no needs         Transportation Services  Private: Car    Final Discharge Disposition Code: 01 - home or self-care

## 2021-03-03 NOTE — OUTREACH NOTE
Call Center TCM Note      Responses   [facility] [place] patient discharged from?  [facility]   Does the patient have one of the following disease processes/diagnoses(primary or secondary)?  Stroke (TIA)   TCM attempt successful?  No   Unsuccessful attempts  Attempt 2          Joana Gomez RN    3/3/2021, 15:00 EST

## 2021-03-03 NOTE — OUTREACH NOTE
Call Center TCM Note      Responses   [facility] patient discharged from?  [facility]   Does the patient have one of the following disease processes/diagnoses(primary or secondary)?  Stroke (TIA)   TCM attempt successful?  No   Unsuccessful attempts  Attempt 1          Joana Gomez RN    3/3/2021, 13:58 EST

## 2021-03-04 ENCOUNTER — APPOINTMENT (OUTPATIENT)
Dept: CT IMAGING | Facility: HOSPITAL | Age: 61
End: 2021-03-04

## 2021-03-04 ENCOUNTER — HOSPITAL ENCOUNTER (EMERGENCY)
Facility: HOSPITAL | Age: 61
Discharge: HOME | End: 2021-03-04
Attending: EMERGENCY MEDICINE | Admitting: EMERGENCY MEDICINE

## 2021-03-04 ENCOUNTER — TRANSITIONAL CARE MANAGEMENT TELEPHONE ENCOUNTER (OUTPATIENT)
Dept: CALL CENTER | Facility: HOSPITAL | Age: 61
End: 2021-03-04

## 2021-03-04 VITALS
RESPIRATION RATE: 16 BRPM | BODY MASS INDEX: 39.37 KG/M2 | TEMPERATURE: 99 F | SYSTOLIC BLOOD PRESSURE: 119 MMHG | WEIGHT: 275 LBS | HEART RATE: 96 BPM | OXYGEN SATURATION: 94 % | DIASTOLIC BLOOD PRESSURE: 78 MMHG | HEIGHT: 70 IN

## 2021-03-04 DIAGNOSIS — R53.1 GENERAL WEAKNESS: Primary | ICD-10-CM

## 2021-03-04 DIAGNOSIS — J32.1 FRONTAL SINUSITIS, UNSPECIFIED CHRONICITY: ICD-10-CM

## 2021-03-04 DIAGNOSIS — R73.9 HYPERGLYCEMIA: ICD-10-CM

## 2021-03-04 LAB
ALBUMIN SERPL-MCNC: 4.2 G/DL (ref 3.5–5.2)
ALBUMIN/GLOB SERPL: 1.6 G/DL
ALP SERPL-CCNC: 114 U/L (ref 39–117)
ALT SERPL W P-5'-P-CCNC: 48 U/L (ref 1–41)
ANION GAP SERPL CALCULATED.3IONS-SCNC: 10.1 MMOL/L (ref 5–15)
AST SERPL-CCNC: 28 U/L (ref 1–40)
BASOPHILS # BLD AUTO: 0.09 10*3/MM3 (ref 0–0.2)
BASOPHILS NFR BLD AUTO: 1 % (ref 0–1.5)
BILIRUB SERPL-MCNC: 1.1 MG/DL (ref 0–1.2)
BUN SERPL-MCNC: 22 MG/DL (ref 6–20)
BUN/CREAT SERPL: 19.6 (ref 7–25)
CALCIUM SPEC-SCNC: 9.6 MG/DL (ref 8.6–10.5)
CHLORIDE SERPL-SCNC: 92 MMOL/L (ref 98–107)
CO2 SERPL-SCNC: 27.9 MMOL/L (ref 22–29)
CREAT SERPL-MCNC: 1.12 MG/DL (ref 0.76–1.27)
D-LACTATE SERPL-SCNC: 2 MMOL/L (ref 0.5–2)
DEPRECATED RDW RBC AUTO: 43.8 FL (ref 37–54)
EOSINOPHIL # BLD AUTO: 0.32 10*3/MM3 (ref 0–0.4)
EOSINOPHIL NFR BLD AUTO: 3.7 % (ref 0.3–6.2)
ERYTHROCYTE [DISTWIDTH] IN BLOOD BY AUTOMATED COUNT: 16.4 % (ref 12.3–15.4)
GFR SERPL CREATININE-BSD FRML MDRD: 67 ML/MIN/1.73
GLOBULIN UR ELPH-MCNC: 2.6 GM/DL
GLUCOSE SERPL-MCNC: 405 MG/DL (ref 65–99)
HCT VFR BLD AUTO: 44.3 % (ref 37.5–51)
HGB BLD-MCNC: 14.9 G/DL (ref 13–17.7)
IMM GRANULOCYTES # BLD AUTO: 0.07 10*3/MM3 (ref 0–0.05)
IMM GRANULOCYTES NFR BLD AUTO: 0.8 % (ref 0–0.5)
LYMPHOCYTES # BLD AUTO: 1.71 10*3/MM3 (ref 0.7–3.1)
LYMPHOCYTES NFR BLD AUTO: 19.8 % (ref 19.6–45.3)
MCH RBC QN AUTO: 25.3 PG (ref 26.6–33)
MCHC RBC AUTO-ENTMCNC: 33.6 G/DL (ref 31.5–35.7)
MCV RBC AUTO: 75.2 FL (ref 79–97)
MONOCYTES # BLD AUTO: 0.44 10*3/MM3 (ref 0.1–0.9)
MONOCYTES NFR BLD AUTO: 5.1 % (ref 5–12)
NEUTROPHILS NFR BLD AUTO: 6.01 10*3/MM3 (ref 1.7–7)
NEUTROPHILS NFR BLD AUTO: 69.6 % (ref 42.7–76)
NRBC BLD AUTO-RTO: 0 /100 WBC (ref 0–0.2)
PLATELET # BLD AUTO: 212 10*3/MM3 (ref 140–450)
PMV BLD AUTO: 9.6 FL (ref 6–12)
POTASSIUM SERPL-SCNC: 4.4 MMOL/L (ref 3.5–5.2)
PROT SERPL-MCNC: 6.8 G/DL (ref 6–8.5)
RBC # BLD AUTO: 5.89 10*6/MM3 (ref 4.14–5.8)
SODIUM SERPL-SCNC: 130 MMOL/L (ref 136–145)
TROPONIN T SERPL-MCNC: 0.01 NG/ML (ref 0–0.03)
WBC # BLD AUTO: 8.64 10*3/MM3 (ref 3.4–10.8)

## 2021-03-04 PROCEDURE — 85025 COMPLETE CBC W/AUTO DIFF WBC: CPT | Performed by: EMERGENCY MEDICINE

## 2021-03-04 PROCEDURE — 70450 CT HEAD/BRAIN W/O DYE: CPT

## 2021-03-04 PROCEDURE — 93005 ELECTROCARDIOGRAM TRACING: CPT | Performed by: EMERGENCY MEDICINE

## 2021-03-04 PROCEDURE — 84484 ASSAY OF TROPONIN QUANT: CPT | Performed by: EMERGENCY MEDICINE

## 2021-03-04 PROCEDURE — 83605 ASSAY OF LACTIC ACID: CPT | Performed by: EMERGENCY MEDICINE

## 2021-03-04 PROCEDURE — 99283 EMERGENCY DEPT VISIT LOW MDM: CPT

## 2021-03-04 PROCEDURE — 80053 COMPREHEN METABOLIC PANEL: CPT | Performed by: EMERGENCY MEDICINE

## 2021-03-04 RX ORDER — AMOXICILLIN AND CLAVULANATE POTASSIUM 875; 125 MG/1; MG/1
1 TABLET, FILM COATED ORAL 2 TIMES DAILY
Qty: 14 TABLET | Refills: 0 | Status: SHIPPED | OUTPATIENT
Start: 2021-03-04 | End: 2021-03-11

## 2021-03-04 RX ADMIN — SODIUM CHLORIDE 1000 ML: 9 INJECTION, SOLUTION INTRAVENOUS at 12:23

## 2021-03-04 NOTE — ED PROVIDER NOTES
"Subjective   60-year-old male presenting with weakness.  He states that for the last 30 minutes he has had generalized weakness \"from my hair to my toenails\".  He denies any numbness, focal weakness, vision changes, speech difficulty. He denies any nausea, vomiting, fevers, chills, cough, shortness of breath, chest pain.  He does note his head \"feels funny\" but he cannot further describe that sensation.  Patient was just in the hospital less than a week ago for right-sided numbness.  It looks like he had a full neurologic evaluation at that time, it was determined that he may have had a lacunar infarct from his uncontrolled diabetes.  He did have his aspirin increased to a full-strength aspirin, this is in addition to his Plavix.  He claims that he has been taking all of his medications as prescribed.          Review of Systems   HENT: Negative.    Eyes: Negative.    Respiratory: Negative.    Cardiovascular: Negative.    Gastrointestinal: Negative.    Genitourinary: Negative.    Musculoskeletal: Negative.    Skin: Negative.    Neurological: Positive for weakness. Negative for seizures, speech difficulty, numbness and headaches.   Psychiatric/Behavioral: Negative.        Past Medical History:   Diagnosis Date   • Anxiety    • Arthritis    • Asthma    • Brachial neuritis 1/19/2017   • Cellulitis     left arm and right leg    • Chest pain    • CHF (congestive heart failure) (CMS/[facility])     Patient reported history May 2020    • COPD (chronic obstructive pulmonary disease) (CMS/[facility])    • Coronary artery disease     Patient reported CABG , 3 vessel   • Depression    • Diabetes mellitus (CMS/[facility])     diagnosed in 1998, checks fsbg 3-4x/day   • Dizziness    • Edema    • Elevated cholesterol    • GERD (gastroesophageal reflux disease)    • H/O chest x-ray 07/04/2015    Mild Left base atelectasis   • H/O echocardiogram 07/05/2015    Normal LVSF. EF of 60-65%. Grade 1 diastolic dysfunction of the LV myocardium. No " evidence of pericardial effusion   • H/O exercise stress test    • Hearing loss     No use of hearing aids   • History of fracture     Reported 2 bones in left foot and a finger   • History of herniated intervertebral disc     History of left L5-S1 disc herniation   • History of pneumonia    • History of pneumonia    • Hyperlipidemia    • Hypertension    • Impaired functional mobility, balance, gait, and endurance    • LOM (loss of memory) 1/19/2017   • Lower back pain     Right   • Measles    • MUSE (nonalcoholic steatohepatitis)    • Neuropathy     feet    • EDD treated with BiPAP     BiPAP HS - instructed to bring mask/machine DOS (setting 13 and 5)   • Palpitations    • Pericardial effusion    • Poor dentition     Patient reported missing multiple teeth   • Renal disorder    • Seizure disorder (CMS/HCC)     focal seizures   • SOB (shortness of breath)    • Staph infection     back after sugery at the incision site    • Stroke (CMS/HCC)     x2 most recent , slight weakness in hands occasionaly    • Wears glasses        Allergies   Allergen Reactions   • Sulfa Antibiotics Anaphylaxis, Nausea And Vomiting and Delirium   • Invokana [Canagliflozin] Unknown - Low Severity     DKA   • Codeine Nausea And Vomiting     Can only take with Phenergan   • Morphine Unknown - Low Severity     Does not work. It causes pain       Past Surgical History:   Procedure Laterality Date   • BACK SURGERY     • CARDIAC CATHETERIZATION     • CARDIAC CATHETERIZATION N/A 8/11/2017    Procedure: Coronary angiography;  Surgeon: Dallas Kim MD;  Location: [facility] INVASIVE LOCATION;  Service:    • CARDIAC CATHETERIZATION N/A 8/11/2017    Procedure: Left Heart Cath;  Surgeon: Dallas Kim MD;  Location: [facility] CATH INVASIVE LOCATION;  Service:    • CARDIAC CATHETERIZATION N/A 8/11/2017    Procedure: Left ventriculography;  Surgeon: Dallas Kim MD;  Location: [facility] INVASIVE LOCATION;  Service:    • CARDIAC  CATHETERIZATION      2002 x1 stent,  x1 stent   • CARDIOVASCULAR STRESS TEST  07/03/2017    WITH DR HERNANDEZ AT [facility]   • CARPAL TUNNEL RELEASE Right    • CATARACT EXTRACTION W/ INTRAOCULAR LENS IMPLANT Right 6/12/2017    Procedure: CATARACT PHACO EXTRACTION WITH INTRAOCULAR LENS IMPLANT RIGHT ;  Surgeon: Natalie Cao MD;  Location: [facility] OR;  Service:    • CATARACT EXTRACTION W/ INTRAOCULAR LENS IMPLANT Left 7/10/2017    Procedure: CATARACT PHACO EXTRACTION WITH INTRAOCULAR LENS IMPLANT LEFT;  Surgeon: Natalie Cao MD;  Location: [facility] OR;  Service:    • CHOLECYSTECTOMY     • COLONOSCOPY     • COLONOSCOPY N/A 7/2/2020    Procedure: COLONOSCOPY;  Surgeon: Petra Crowell MD;  Location: [facility] ENDOSCOPY;  Service: Gastroenterology;  Laterality: N/A;  poor prep   • CORONARY ANGIOPLASTY WITH STENT PLACEMENT      X1-LAD   • CORONARY ARTERY BYPASS GRAFT N/A 8/15/2017    Procedure: CORONARY ARTERY BYPASS GRAFTING x 3 UTILIZING THE LEFT INTERNAL MAMMARY ARTERY WITH ENDOSCOPIC VEIN HARVESTING OF THE RIGHT GREATER SAPHENOUS VEIN, HAMLET, LAD ENDARECTOMY;  Surgeon: London Damon MD;  Location: [facility] OR;  Service:    • ENDOSCOPY     • EYE CAPSULOTOMY WITH LASER Right 11/25/2019    Procedure: EYE CAPSULOTOMY WITH LASER RIGHT;  Surgeon: Natalie Cao MD;  Location: [facility] OR;  Service: Ophthalmology   • EYE CAPSULOTOMY WITH LASER Left 12/9/2019    Procedure: EYE CAPSULOTOMY WITH LASER LEFT;  Surgeon: Natalie Cao MD;  Location: [facility] OR;  Service: Ophthalmology   • EYE SURGERY      cataract surgery both eyes   • INTERVENTIONAL RADIOLOGY PROCEDURE Bilateral 12/31/2019    Procedure: Carotid Cerebral Angiogram;  Surgeon: Damian Dubois MD;  Location: [facility] CATH INVASIVE LOCATION;  Service: Interventional Radiology   • KNEE ARTHROSCOPY Bilateral    • KNEE ARTHROSCOPY Right 2010    Dr Lewis   • KNEE ARTHROSCOPY Left 2008    Dr Jameson   • KNEE MENISCECTOMY Right 10/15/2020    Procedure: Right knee arthroscopy  with partial medial and lateral meniscectomy and three compartment chondroplasty.;  Surgeon: South Lewis MD;  Location: [facility] OR;  Service: Orthopedics;  Laterality: Right;   • MOUTH SURGERY      Oral extractions   • NEUROPLASTY / TRANSPOSITION ULNAR NERVE AT ELBOW Left    • OTHER SURGICAL HISTORY      Foraminotomy and discectomy   • PERICARDIAL WINDOW N/A 2017    Procedure: PERICARDIAL WINDOW;  Surgeon: Freeman Phillips MD;  Location: [facility] OR;  Service:        Family History   Problem Relation Age of Onset   • Hypertension Mother    • Arthritis Mother    • Alcohol abuse Father    • Heart disease Other    • Stroke Other    • Hypertension Other    • Other Other         Neurologic disorder   • Parkinsonism Other    • Stroke Other    • Heart disease Other    • Hypertension Other    • Heart disease Other    • Stroke Other    • Hypertension Other    • Colon cancer Neg Hx        Social History     Socioeconomic History   • Marital status:      Spouse name: Not on file   • Number of children: 1   • Years of education: Not on file   • Highest education level: Not on file   Occupational History   • Occupation: [employer]     Employer: DISABLED     Comment: Disabled since -Back Problems   Tobacco Use   • Smoking status: Former Smoker     Packs/day: 1.00     Years: 10.00     Pack years: 10.00     Types: Cigarettes     Quit date: 1998     Years since quittin.1   • Smokeless tobacco: Former User     Types: Snuff     Quit date:    Substance and Sexual Activity   • Alcohol use: Yes     Frequency: Monthly or less     Drinks per session: 3 or 4     Comment: 3 drinks a year   • Drug use: No   • Sexual activity: Defer   Social History Narrative    Caffeine use: 0-1 serving daily.    Patient lives at home with wife.            Objective   Physical Exam  Vitals signs reviewed.   Constitutional:       General: He is not in acute distress.     Appearance: Normal appearance. He is  not ill-appearing, toxic-appearing or diaphoretic.   HENT:      Head: Normocephalic and atraumatic.      Right Ear: External ear normal.      Left Ear: External ear normal.      Nose: Nose normal.      Mouth/Throat:      Mouth: Mucous membranes are moist.      Pharynx: Oropharynx is clear.   Eyes:      Extraocular Movements: Extraocular movements intact.      Conjunctiva/sclera: Conjunctivae normal.      Pupils: Pupils are equal, round, and reactive to light.   Neck:      Musculoskeletal: Normal range of motion and neck supple.   Cardiovascular:      Rate and Rhythm: Regular rhythm.      Pulses: Normal pulses.      Heart sounds: Normal heart sounds.      Comments: Mild tachycardia  Pulmonary:      Effort: Pulmonary effort is normal. No respiratory distress.      Breath sounds: Normal breath sounds.   Abdominal:      General: Bowel sounds are normal. There is no distension.      Tenderness: There is no abdominal tenderness.   Musculoskeletal: Normal range of motion.         General: No swelling, tenderness or deformity.   Skin:     General: Skin is warm and dry.      Capillary Refill: Capillary refill takes less than 2 seconds.      Findings: No rash.   Neurological:      General: No focal deficit present.      Mental Status: He is alert and oriented to person, place, and time.      Comments: Mild drift of the right side, sensation intact, cranial nerves intact   Psychiatric:         Mood and Affect: Mood normal.         Behavior: Behavior normal.         Procedures           ED Course                                           MDM  Number of Diagnoses or Management Options  Frontal sinusitis, unspecified chronicity:   General weakness:   Hyperglycemia:   Diagnosis management comments: 60-year-old male with general weakness.  Well-developed, well-nourished man in no distress with exam as above.  He does have some right-sided drift on exam.  Of note this is nearly identical to the exam that I documented at his last  admission.  Does not seem to have any acute findings today.  Will obtain labs, EKG and head CT.  His blood pressure is low normal so we will give some IV fluids.  Disposition pending.    DDx: CVA, anxiety, diabetes, electrolyte abnormality    EKG interpreted by me: Sinus tachycardia, right axis, no acute ST/T changes, this is an abnormal EKG, this appears similar to previous    Work-up here notable for some mild hyperglycemia.  Head CT per radiology reveals some findings consistent with sinusitis.  I do not think his symptoms today represent a stroke has he has weakness per his report from his head to his toes.  His exam is essentially the same as it was at his previous admission.  He is already on full-strength aspirin and Plavix.  I do not feel there is any indication for admission at this time.  Will discharge home with outpatient follow-up and course of antibiotics for the sinusitis.      Final diagnoses:   General weakness   Frontal sinusitis, unspecified chronicity   Hyperglycemia            Adonay Pabon MD  03/04/21 [id number]

## 2021-03-04 NOTE — OUTREACH NOTE
Call Center TCM Note      Responses   [facility] [place] patient discharged from?  [facility]   Does the patient have one of the following disease processes/diagnoses(primary or secondary)?  Stroke (TIA)   TCM attempt successful?  Yes   Call start time  1111   Call end time  1113   Discharge diagnosis  Acute CVA    Does the patient have all medications ordered at discharge?  Yes   Is the patient taking all medications as directed (includes completed medication regime)?  Yes   Does the patient have a primary care provider?   Yes   Does the patient have an appointment with their PCP within 7 days of discharge?  Yes   Comments regarding PCP  f/u with Dr. Godoy today (3/4/21)   Has the patient kept scheduled appointments due by today?  N/A   Psychosocial issues?  No   Did the patient receive a copy of their discharge instructions?  Yes   Nursing interventions  Reviewed instructions with patient   What is the patient's perception of their health status since discharge?  Worsening   Nursing interventions  Nurse provided patient education   Is the patient able to teach back FAST for Stroke?  Yes   Is the patient/caregiver able to teach back signs and symptoms related to disease process for when to call PCP?  Yes   Is the patient/caregiver able to teach back signs and symptoms related to disease process for when to call 911?  Yes   Is the patient/caregiver able to teach back the hierarchy of who to call/visit for symptoms/problems? PCP, Specialist, Home health nurse, Urgent Care, ED, 911  Yes   TCM call completed?  Yes   Wrap up additional comments  States he is not feeling good today, dizzy and feeling a new numbness to his other side, advised him to go to the ER to get checked out, he verbalized understanding.          Joana Gomez RN    3/4/2021, 11:13 EST

## 2021-03-10 ENCOUNTER — READMISSION MANAGEMENT (OUTPATIENT)
Dept: CALL CENTER | Facility: HOSPITAL | Age: 61
End: 2021-03-10

## 2021-03-10 NOTE — OUTREACH NOTE
Stroke Week 2 Survey      Responses   [facility] patient discharged from?  [facility]   Does the patient have one of the following disease processes/diagnoses(primary or secondary)?  Stroke (TIA)   Week 2 attempt successful?  Yes   Call start time  1232   Call end time  1242   Discharge diagnosis  Acute CVA    Is patient permission given to speak with other caregiver?  Yes   List who call center can speak with  Mrs. Harding, spouse   Person spoke with today (if not patient) and relationship  patient   Meds reviewed with patient/caregiver?  Yes   Is the patient having any side effects they believe may be caused by any medication additions or changes?  No   Does the patient have all medications ordered at discharge?  Yes   Is the patient taking all medications as directed (includes completed medication regime)?  Yes   Does the patient have a primary care provider?   Yes   Does the patient have an appointment with their PCP within 7 days of discharge?  Yes   Has the patient kept scheduled appointments due by today?  No   What is preventing the patient from keeping their appointments?  -- [patient reports that he rescheduled cardiology appt because he was out of town. ]   Nursing Interventions  Educated on importance of keeping appointment   Has home health visited the patient within 72 hours of discharge?  N/A   Psychosocial issues?  No   Does the patient require any assistance with activities of daily living such as eating, bathing, dressing, walking, etc.?  No   ADL comments  patient reports ambulatory with a cane.    Does the patient have any residual symptoms from stroke/TIA?  Yes   Residual symptoms comments  Patient reports still with some vision blurriness and right hand tingly/numbness and right foot.    Does the patient understand the diet ordered at discharge?  Yes   Did the patient receive a copy of their discharge instructions?  Yes   Nursing interventions  Reviewed instructions with patient   What is  the patient's perception of their health status since discharge?  Improving [Patient with ER visit since last call and treated for sinus infection. He reports that he is better. ]   Nursing interventions  Nurse provided patient education   Is the patient able to teach back FAST for Stroke?  Yes   Is the patient/caregiver able to teach back the risk factors for a stroke?  Diabetes, High blood pressure-goal below 120/80, High Cholesterol   Is the patient/caregiver able to teach back signs and symptoms related to disease process for when to call PCP?  Yes   Is the patient/caregiver able to teach back signs and symptoms related to disease process for when to call 911?  Yes   If the patient is a current smoker, are they able to teach back resources for cessation?  Not a smoker   Is the patient/caregiver able to teach back the hierarchy of who to call/visit for symptoms/problems? PCP, Specialist, Home health nurse, Urgent Care, ED, 911  Yes   Week 2 call completed?  Yes          Danielle Lara RN

## 2021-03-17 ENCOUNTER — READMISSION MANAGEMENT (OUTPATIENT)
Dept: CALL CENTER | Facility: HOSPITAL | Age: 61
End: 2021-03-17

## 2021-03-17 NOTE — OUTREACH NOTE
Stroke Week 3 Survey      Responses   [facility] patient discharged from?  [facility]   Does the patient have one of the following disease processes/diagnoses(primary or secondary)?  Stroke (TIA)   Week 3 attempt successful?  Yes   Call start time  0728   Revoke  Readmitted [patient is at  at this time, patient  TPA on Saturday]   Call end time  0729          Marie Dia RN

## 2021-03-22 ENCOUNTER — OFFICE VISIT (OUTPATIENT)
Dept: INTERNAL MEDICINE | Facility: CLINIC | Age: 61
End: 2021-03-22

## 2021-03-22 VITALS
OXYGEN SATURATION: 95 % | WEIGHT: 288 LBS | HEART RATE: 93 BPM | SYSTOLIC BLOOD PRESSURE: 146 MMHG | DIASTOLIC BLOOD PRESSURE: 83 MMHG | TEMPERATURE: 98 F | RESPIRATION RATE: 14 BRPM | BODY MASS INDEX: 41.23 KG/M2 | HEIGHT: 70 IN

## 2021-03-22 DIAGNOSIS — R56.9 SEIZURES (HCC): ICD-10-CM

## 2021-03-22 DIAGNOSIS — I10 BENIGN ESSENTIAL HYPERTENSION: Primary | ICD-10-CM

## 2021-03-22 DIAGNOSIS — Z79.4 TYPE 2 DIABETES MELLITUS WITH HYPERGLYCEMIA, WITH LONG-TERM CURRENT USE OF INSULIN (HCC): ICD-10-CM

## 2021-03-22 DIAGNOSIS — E11.65 TYPE 2 DIABETES MELLITUS WITH HYPERGLYCEMIA, WITH LONG-TERM CURRENT USE OF INSULIN (HCC): ICD-10-CM

## 2021-03-22 PROCEDURE — 99496 TRANSJ CARE MGMT HIGH F2F 7D: CPT | Performed by: INTERNAL MEDICINE

## 2021-03-22 RX ORDER — METOPROLOL SUCCINATE 200 MG/1
200 TABLET, EXTENDED RELEASE ORAL EVERY 24 HOURS
Qty: 90 TABLET | Refills: 2 | Status: SHIPPED | OUTPATIENT
Start: 2021-03-22 | End: 2021-06-14 | Stop reason: SDUPTHER

## 2021-03-22 NOTE — PROGRESS NOTES
Transitional Care Follow Up Visit  Subjective     Denzel Harding is a 61 y.o. male who presents for a transitional care management visit.    Within 48 business hours after discharge our office contacted him via telephone to coordinate his care and needs.      I reviewed and discussed the details of that call along with the discharge summary, hospital problems, inpatient lab results, inpatient diagnostic studies, and consultation reports with Denzel.     Current outpatient and discharge medications have been reconciled for the patient.  Reviewed by: Isaura Godoy MD      Date of TCM Phone Call 3/2/2021   [facility]   Date of Admission 2/27/2021   Date of Discharge 3/2/2021   Discharge Disposition Home or Self Care     Risk for Readmission (LACE) No data recorded    Chief Complaint   Patient presents with   • Follow-up     [facility]    • Hypertension   • Diabetes       History of Present Illness   Course During Hospital Stay:    Patient  Is here to follow up on hospitalization at [facility] , admitted on 3- from [facility] where he received TPA for ? Stroke  And discharged on 3/20/2021 and sent on lamictal for possible seizures with follow up  At [facility] Neurology clinic ,  He states he has been doing well at home and being able to ambulate , he is also here to follow up on blood pressure and he is taking his medications ,he sees endocrinology  This week , he has been a diabetic on insulin since 1998      The following portions of the patient's history were reviewed and updated as appropriate: allergies, current medications, past family history, past medical history, past social history, past surgical history and problem list.    Review of Systems   Constitutional: Negative for appetite change, fatigue and fever.   HENT: Negative for congestion, ear discharge, ear pain, sinus pressure and sore throat.    Eyes: Negative for  pain and discharge.   Respiratory: Negative for cough, shortness of breath and wheezing.    Cardiovascular: Negative for chest pain, palpitations and leg swelling.   Gastrointestinal: Negative for abdominal pain, constipation, diarrhea, nausea and vomiting.   Endocrine: Negative for cold intolerance and heat intolerance.   Genitourinary: Negative for dysuria and flank pain.   Musculoskeletal: Negative for arthralgias and joint swelling.   Skin: Negative for pallor and rash.   Allergic/Immunologic: Negative for environmental allergies and food allergies.   Neurological: Negative for dizziness and weakness.   Hematological: Negative for adenopathy. Does not bruise/bleed easily.   Psychiatric/Behavioral: Negative for behavioral problems and dysphoric mood. The patient is not nervous/anxious.        Objective   Physical Exam  Vitals and nursing note reviewed.   Constitutional:       General: He is not in acute distress.     Appearance: Normal appearance. He is not diaphoretic.   HENT:      Head: Normocephalic and atraumatic.      Right Ear: External ear normal.      Left Ear: External ear normal.      Nose: Nose normal.   Eyes:      Extraocular Movements: Extraocular movements intact.      Conjunctiva/sclera: Conjunctivae normal.   Neck:      Trachea: Trachea normal.   Cardiovascular:      Rate and Rhythm: Normal rate and regular rhythm.      Heart sounds: Normal heart sounds.   Pulmonary:      Effort: Pulmonary effort is normal. No respiratory distress.   Abdominal:      General: Abdomen is flat.   Musculoskeletal:      Cervical back: Neck supple.      Comments: Moves all limbs  Walks with cane   Skin:     General: Skin is warm and dry.      Findings: No erythema.   Neurological:      Mental Status: He is alert and oriented to person, place, and time.      Comments: No gross motor or sensory deficits       Data reviewed :  DISCHARGE SUMMARY - SCAN - DISCHARGE [facility], [facility], 03/20/2021  (03/20/2021)    Assessment/Plan   Diagnoses and all orders for this visit:    1. Benign essential hypertension (Primary)  -     metoprolol succinate XL (TOPROL-XL) 200 MG 24 hr tablet; Take 1 tablet by mouth Daily  Dispense: 90 tablet; Refill: 2    2. Type 2 diabetes mellitus with hyperglycemia, with long-term current use of insulin (CMS/[facility])    3. Seizures (CMS/[facility])      Plan:  1.  Benign essential hypertension: Will continue current medication, low-sodium diet advised, Counseled to regularly check BP at home with goal averaging <130/80.   2  Diabetes  Mellitus  :   Reviewed    fasting CMP  and hba1c  , diet and exercise counseled , Will continue current medications - per endocrinology  3. Seizure : on lamictal per [facility] neurology , advised to keep follow-up appointment with neurology, I have informed the patient that his discharge summary does state that he has an appointment to see neurosurgery at [facility] for an aneurysm, I do not have the MRI results, patient is unaware of this but he states he will look at his paperwork to follow-up with the neurosurgery you can see, he is advised to keep all his appointments with a specialist

## 2021-03-24 ENCOUNTER — OFFICE VISIT (OUTPATIENT)
Dept: PULMONOLOGY | Facility: CLINIC | Age: 61
End: 2021-03-24

## 2021-03-24 VITALS
WEIGHT: 285 LBS | HEART RATE: 77 BPM | SYSTOLIC BLOOD PRESSURE: 122 MMHG | OXYGEN SATURATION: 96 % | DIASTOLIC BLOOD PRESSURE: 82 MMHG | HEIGHT: 70 IN | RESPIRATION RATE: 16 BRPM | BODY MASS INDEX: 40.8 KG/M2

## 2021-03-24 DIAGNOSIS — J45.50 SEVERE PERSISTENT ASTHMA WITHOUT COMPLICATION: ICD-10-CM

## 2021-03-24 DIAGNOSIS — E66.01 MORBID OBESITY, UNSPECIFIED OBESITY TYPE (HCC): ICD-10-CM

## 2021-03-24 DIAGNOSIS — G47.33 OBSTRUCTIVE SLEEP APNEA: ICD-10-CM

## 2021-03-24 DIAGNOSIS — J30.89 OTHER ALLERGIC RHINITIS: ICD-10-CM

## 2021-03-24 DIAGNOSIS — R06.02 SHORTNESS OF BREATH: Primary | ICD-10-CM

## 2021-03-24 PROCEDURE — 99214 OFFICE O/P EST MOD 30 MIN: CPT | Performed by: INTERNAL MEDICINE

## 2021-03-24 RX ORDER — LAMOTRIGINE 100 MG/1
100 TABLET ORAL EVERY MORNING
COMMUNITY
Start: 2021-03-20 | End: 2022-01-01

## 2021-03-24 RX ORDER — AZELASTINE 1 MG/ML
1 SPRAY, METERED NASAL 2 TIMES DAILY PRN
Qty: 1 EACH | Refills: 5 | Status: SHIPPED | OUTPATIENT
Start: 2021-03-24 | End: 2021-07-26 | Stop reason: SDUPTHER

## 2021-03-24 NOTE — PROGRESS NOTES
"  Chief Complaint   Patient presents with   • Follow-up   • Shortness of Breath       Subjective   Denzel Harding is a 61 y.o. male.     History of Present Illness   Patient was evaluated today for follow up of asthma, EDD and shortness of breath. Patient does not report any recent exacerbations requiring emergency room visits or hospitalizations.    Patient is using the rescue inhalers minimally. he is compliant with pulmonary medicines, as prescribed.    He is on Dupixent and is on it regularly.    Patient doesn't report any issues with the PAP device. The patient describes no significant issues with his mask either.     Patient says that the compliance with the use of the equipment is good.     Patient says that his symptoms of fatigue & daytime sleepiness have been helped greatly with the use of PAP, as prescribed.     Patient says that he has not been using his nasal sprays on a regular basis and describes ongoing issues with occasional congestion.       The following portions of the patient's history were reviewed and updated as appropriate: allergies, current medications, past family history, past medical history, past social history and past surgical history.    Review of Systems   Constitutional: Negative for chills and fever.   HENT: Positive for rhinorrhea, sinus pressure and sneezing. Negative for sore throat.    Respiratory: Positive for shortness of breath. Negative for cough and wheezing.    Psychiatric/Behavioral: Negative for sleep disturbance.       Objective   Visit Vitals  /82   Pulse 77   Resp 16   Ht 177.8 cm (70\")   Wt 129 kg (285 lb)   SpO2 96%   BMI 40.89 kg/m²       Physical Exam  Vitals reviewed.   Constitutional:       Appearance: He is well-developed.   HENT:      Head: Atraumatic.      Mouth/Throat:      Comments: Oropharynx was crowded.  Neck:      Comments: Increased adipose tissue noted.  Cardiovascular:      Rate and Rhythm: Normal rate and regular rhythm.      Heart sounds: " Normal heart sounds. No murmur heard.     Pulmonary:      Effort: Pulmonary effort is normal.      Comments: Somewhat hyperresonant to percussion.  Somewhat decreased air entry.  No wheezing noted.   Musculoskeletal:      Comments: Used a cane.    Neurological:      Mental Status: He is alert and oriented to person, place, and time.         Assessment/Plan   Diagnoses and all orders for this visit:    1. Shortness of breath (Primary)  -     Pulmonary Function Test; Future    2. Severe persistent asthma without complication  -     Pulmonary Function Test; Future    3. Obstructive sleep apnea    4. Morbid obesity, unspecified obesity type (CMS/[facility])    5. Other allergic rhinitis    Other orders  -     azelastine (ASTELIN) 0.1 % nasal spray; 1 spray into the nostril(s) as directed by provider 2 (Two) Times a Day As Needed for Rhinitis or Allergies. Use in each nostril as directed  Dispense: 1 each; Refill: 5         Return in about 4 months (around 7/24/2021) for Recheck, PFT F/U, For Matilde, ....Also 10 mths w/ Dr. Barajas.    DISCUSSION (if any):  PFTs from feb 2019 showed mild obstruction    Will repeat PFTs upon follow up.     It appears that his symptoms of asthma are under adequate control with the current regimen.    Patient's medications for underlying asthma were reviewed in great detail.    We will stop Asmanex as he is doing fair and has not had any exacerbation lately.    He will need to continue Dupixent as it seems to be helping his asthma.     Compliance with medications stressed.     Side effects of prescribed medications discussed with the patient.    The need to continue to be aware of triggers that may cause asthma exacerbation versus progression of disease, was also discussed.    I have discussed asthma action plan with him.    The patient was asked to call this office if the symptoms worsen.    Patient was advised to start using nasal spray on a regular basis, especially since his symptoms are  consistent with likely poorly controlled allergic rhinitis.    Continue treatment with BiPAP at a pressure of 13/5, with nasal pillows.    Patient seems to be compliant with PAP device, based on the available data and his account of improved symptoms.     Compliance data was obtained from the patient's device/DME company and it was reviewed in detail with the patient.    Sleep hygiene measures were discussed in great detail including need to follow a strict bedtime and to avoid electronic devices in bed and close to bedtime.    We also discussed the need to avoid unscheduled naps as that can have adverse effects on sleep efficiency at night.    he was also asked to avoid caffeinated or alcoholic beverages within 4 to 6 hours of bedtime.    Weight loss advised.    The patient was once again reminded to continue using the PAP device regularly, every night for atleast 4 hours.      Dictated utilizing Dragon dictation.    This document was electronically signed by Linda Barajas MD on 03/24/21 at 11:47 EDT

## 2021-03-30 DIAGNOSIS — I10 BENIGN ESSENTIAL HYPERTENSION: Primary | ICD-10-CM

## 2021-04-22 ENCOUNTER — HOSPITAL ENCOUNTER (EMERGENCY)
Facility: HOSPITAL | Age: 61
Discharge: HOME | End: 2021-04-22
Attending: EMERGENCY MEDICINE | Admitting: EMERGENCY MEDICINE

## 2021-04-22 ENCOUNTER — APPOINTMENT (OUTPATIENT)
Dept: CT IMAGING | Facility: HOSPITAL | Age: 61
End: 2021-04-22

## 2021-04-22 ENCOUNTER — TELEPHONE (OUTPATIENT)
Dept: INTERNAL MEDICINE | Facility: CLINIC | Age: 61
End: 2021-04-22

## 2021-04-22 VITALS
WEIGHT: 282 LBS | RESPIRATION RATE: 20 BRPM | HEART RATE: 70 BPM | HEIGHT: 70 IN | DIASTOLIC BLOOD PRESSURE: 62 MMHG | SYSTOLIC BLOOD PRESSURE: 104 MMHG | OXYGEN SATURATION: 97 % | BODY MASS INDEX: 40.37 KG/M2 | TEMPERATURE: 98.5 F

## 2021-04-22 DIAGNOSIS — R51.9 ACUTE NONINTRACTABLE HEADACHE, UNSPECIFIED HEADACHE TYPE: Primary | ICD-10-CM

## 2021-04-22 DIAGNOSIS — J32.1 CHRONIC FRONTAL SINUSITIS: ICD-10-CM

## 2021-04-22 LAB — MAGNESIUM SERPL-MCNC: 1.8 MG/DL (ref 1.6–2.4)

## 2021-04-22 PROCEDURE — 25010000002 HYDROMORPHONE 1 MG/ML SOLUTION: Performed by: EMERGENCY MEDICINE

## 2021-04-22 PROCEDURE — 25010000002 KETOROLAC TROMETHAMINE PER 15 MG: Performed by: EMERGENCY MEDICINE

## 2021-04-22 PROCEDURE — 93005 ELECTROCARDIOGRAM TRACING: CPT | Performed by: EMERGENCY MEDICINE

## 2021-04-22 PROCEDURE — 83735 ASSAY OF MAGNESIUM: CPT | Performed by: EMERGENCY MEDICINE

## 2021-04-22 PROCEDURE — 99284 EMERGENCY DEPT VISIT MOD MDM: CPT

## 2021-04-22 PROCEDURE — 70450 CT HEAD/BRAIN W/O DYE: CPT

## 2021-04-22 PROCEDURE — 96374 THER/PROPH/DIAG INJ IV PUSH: CPT

## 2021-04-22 PROCEDURE — 96375 TX/PRO/DX INJ NEW DRUG ADDON: CPT

## 2021-04-22 RX ORDER — AMOXICILLIN 875 MG/1
875 TABLET, COATED ORAL 2 TIMES DAILY
Qty: 14 TABLET | Refills: 0 | OUTPATIENT
Start: 2021-04-22 | End: 2021-04-29

## 2021-04-22 RX ORDER — BUTALBITAL, ACETAMINOPHEN AND CAFFEINE 50; 325; 40 MG/1; MG/1; MG/1
1 TABLET ORAL ONCE
Status: COMPLETED | OUTPATIENT
Start: 2021-04-22 | End: 2021-04-22

## 2021-04-22 RX ORDER — BUTALBITAL, ACETAMINOPHEN AND CAFFEINE 50; 325; 40 MG/1; MG/1; MG/1
2 TABLET ORAL EVERY 6 HOURS PRN
Qty: 10 TABLET | Refills: 0 | Status: SHIPPED | OUTPATIENT
Start: 2021-04-22 | End: 2021-06-14

## 2021-04-22 RX ORDER — KETOROLAC TROMETHAMINE 30 MG/ML
30 INJECTION, SOLUTION INTRAMUSCULAR; INTRAVENOUS EVERY 6 HOURS PRN
Status: DISCONTINUED | OUTPATIENT
Start: 2021-04-22 | End: 2021-04-22 | Stop reason: HOSPADM

## 2021-04-22 RX ORDER — SODIUM CHLORIDE 0.9 % (FLUSH) 0.9 %
10 SYRINGE (ML) INJECTION AS NEEDED
Status: DISCONTINUED | OUTPATIENT
Start: 2021-04-22 | End: 2021-04-22 | Stop reason: HOSPADM

## 2021-04-22 RX ORDER — MORPHINE SULFATE 4 MG/ML
4 INJECTION, SOLUTION INTRAMUSCULAR; INTRAVENOUS ONCE
Status: DISCONTINUED | OUTPATIENT
Start: 2021-04-22 | End: 2021-04-22 | Stop reason: HOSPADM

## 2021-04-22 RX ADMIN — HYDROMORPHONE HYDROCHLORIDE 0.5 MG: 1 INJECTION, SOLUTION INTRAMUSCULAR; INTRAVENOUS; SUBCUTANEOUS at 15:30

## 2021-04-22 RX ADMIN — SODIUM CHLORIDE 500 ML: 9 INJECTION, SOLUTION INTRAVENOUS at 16:20

## 2021-04-22 RX ADMIN — BUTALBITAL, ACETAMINOPHEN AND CAFFEINE 1 TABLET: 50; 325; 40 TABLET ORAL at 14:28

## 2021-04-22 RX ADMIN — KETOROLAC TROMETHAMINE 30 MG: 30 INJECTION, SOLUTION INTRAMUSCULAR; INTRAVENOUS at 14:29

## 2021-04-22 RX ADMIN — SODIUM CHLORIDE 500 ML: 9 INJECTION, SOLUTION INTRAVENOUS at 13:20

## 2021-04-22 NOTE — ED PROVIDER NOTES
Subjective   History of Present Illness    Chief Complaint: Headache  History of Present Illness: 61-year-old male presents with headache x5 days.  Reported blurred vision nausea.  Aspirin and Plavix for prior strokes.  Recent sinus infection.  Onset: 5 days  Duration: Persist  Exacerbating / Alleviating factors: None  Associated symptoms: No nausea      Nurses Notes reviewed and agree, including vitals, allergies, social history and prior medical history.     REVIEW OF SYSTEMS: All systems reviewed and not pertinent unless noted.    Positive for: Headache Blurry Vision Nausea, Sinus Congestion    Negative for: Fever cough hemoptysis syncope chest pain palpitations abdominal pain back pain urinary symptoms focal weakness gait abnormality  Review of Systems    Past Medical History:   Diagnosis Date   • Anxiety    • Arthritis    • Asthma    • Brachial neuritis 1/19/2017   • Cellulitis     left arm and right leg    • Chest pain    • CHF (congestive heart failure) (CMS/[facility])     Patient reported history May 2020    • COPD (chronic obstructive pulmonary disease) (CMS/[facility])    • Coronary artery disease     Patient reported CABG , 3 vessel   • Depression    • Diabetes mellitus (CMS/[facility])     diagnosed in 1998, checks fsbg 3-4x/day   • Dizziness    • Edema    • Elevated cholesterol    • GERD (gastroesophageal reflux disease)    • H/O chest x-ray 07/04/2015    Mild Left base atelectasis   • H/O echocardiogram 07/05/2015    Normal LVSF. EF of 60-65%. Grade 1 diastolic dysfunction of the LV myocardium. No evidence of pericardial effusion   • H/O exercise stress test    • Hearing loss     No use of hearing aids   • History of fracture     Reported 2 bones in left foot and a finger   • History of herniated intervertebral disc     History of left L5-S1 disc herniation   • History of pneumonia    • History of pneumonia    • Hyperlipidemia    • Hypertension    • Impaired functional mobility, balance, gait, and endurance    •  LOM (loss of memory) 1/19/2017   • Lower back pain     Right   • Measles    • MUSE (nonalcoholic steatohepatitis)    • Neuropathy     feet    • EDD treated with BiPAP     BiPAP HS - instructed to bring mask/machine DOS (setting 13 and 5)   • Palpitations    • Pericardial effusion    • Poor dentition     Patient reported missing multiple teeth   • Renal disorder    • Seizure disorder (CMS/HCC)     focal seizures   • SOB (shortness of breath)    • Staph infection     back after sugery at the incision site    • Stroke (CMS/HCC)     x2 most recent , slight weakness in hands occasionaly    • Wears glasses        Allergies   Allergen Reactions   • Sulfa Antibiotics Anaphylaxis, Nausea And Vomiting and Delirium   • Invokana [Canagliflozin] Unknown - Low Severity     DKA   • Codeine Nausea And Vomiting     Can only take with Phenergan   • Morphine Unknown - Low Severity     Does not work. It causes pain       Past Surgical History:   Procedure Laterality Date   • BACK SURGERY     • CARDIAC CATHETERIZATION     • CARDIAC CATHETERIZATION N/A 8/11/2017    Procedure: Coronary angiography;  Surgeon: Dallas Kim MD;  Location: [facility] CATH INVASIVE LOCATION;  Service:    • CARDIAC CATHETERIZATION N/A 8/11/2017    Procedure: Left Heart Cath;  Surgeon: Dallas Kim MD;  Location: [facility] CATH INVASIVE LOCATION;  Service:    • CARDIAC CATHETERIZATION N/A 8/11/2017    Procedure: Left ventriculography;  Surgeon: Dallas Kim MD;  Location: [facility] CATH INVASIVE LOCATION;  Service:    • CARDIAC CATHETERIZATION      2002 x1 stent,  x1 stent   • CARDIOVASCULAR STRESS TEST  07/03/2017    WITH DR KIM AT [facility]   • CARPAL TUNNEL RELEASE Right    • CATARACT EXTRACTION W/ INTRAOCULAR LENS IMPLANT Right 6/12/2017    Procedure: CATARACT PHACO EXTRACTION WITH INTRAOCULAR LENS IMPLANT RIGHT ;  Surgeon: Natalie Cao MD;  Location: [facility];  Service:    • CATARACT EXTRACTION W/ INTRAOCULAR LENS IMPLANT Left 7/10/2017     Procedure: CATARACT PHACO EXTRACTION WITH INTRAOCULAR LENS IMPLANT LEFT;  Surgeon: Natalie Cao MD;  Location: [facility] OR;  Service:    • CHOLECYSTECTOMY     • COLONOSCOPY     • COLONOSCOPY N/A 7/2/2020    Procedure: COLONOSCOPY;  Surgeon: Petra Crowell MD;  Location: [facility] ENDOSCOPY;  Service: Gastroenterology;  Laterality: N/A;  poor prep   • CORONARY ANGIOPLASTY WITH STENT PLACEMENT      X1-LAD   • CORONARY ARTERY BYPASS GRAFT N/A 8/15/2017    Procedure: CORONARY ARTERY BYPASS GRAFTING x 3 UTILIZING THE LEFT INTERNAL MAMMARY ARTERY WITH ENDOSCOPIC VEIN HARVESTING OF THE RIGHT GREATER SAPHENOUS VEIN, HAMLET, LAD ENDARECTOMY;  Surgeon: London Damon MD;  Location:  [facility] OR;  Service:    • ENDOSCOPY     • EYE CAPSULOTOMY WITH LASER Right 11/25/2019    Procedure: EYE CAPSULOTOMY WITH LASER RIGHT;  Surgeon: Natalie Cao MD;  Location: [facility] OR;  Service: Ophthalmology   • EYE CAPSULOTOMY WITH LASER Left 12/9/2019    Procedure: EYE CAPSULOTOMY WITH LASER LEFT;  Surgeon: Natalie Cao MD;  Location: [facility] OR;  Service: Ophthalmology   • EYE SURGERY      cataract surgery both eyes   • INTERVENTIONAL RADIOLOGY PROCEDURE Bilateral 12/31/2019    Procedure: Carotid Cerebral Angiogram;  Surgeon: Damian Dubois MD;  Location: [facility] CATH INVASIVE LOCATION;  Service: Interventional Radiology   • KNEE ARTHROSCOPY Bilateral    • KNEE ARTHROSCOPY Right 2010    Dr Lewis   • KNEE ARTHROSCOPY Left 2008    Dr Jameson   • KNEE MENISCECTOMY Right 10/15/2020    Procedure: Right knee arthroscopy with partial medial and lateral meniscectomy and three compartment chondroplasty.;  Surgeon: South Lewis MD;  Location: [facility] OR;  Service: Orthopedics;  Laterality: Right;   • MOUTH SURGERY      Oral extractions   • NEUROPLASTY / TRANSPOSITION ULNAR NERVE AT ELBOW Left    • OTHER SURGICAL HISTORY      Foraminotomy and discectomy   • PERICARDIAL WINDOW N/A 8/25/2017    Procedure: PERICARDIAL WINDOW;  Surgeon: Freeman  MD Jacqueline;  Location: [facility];  Service:        Family History   Problem Relation Age of Onset   • Hypertension Mother    • Arthritis Mother    • Alcohol abuse Father    • Heart disease Other    • Stroke Other    • Hypertension Other    • Other Other         Neurologic disorder   • Parkinsonism Other    • Stroke Other    • Heart disease Other    • Hypertension Other    • Heart disease Other    • Stroke Other    • Hypertension Other    • Colon cancer Neg Hx        Social History     Socioeconomic History   • Marital status:      Spouse name: Not on file   • Number of children: 1   • Years of education: Not on file   • Highest education level: Not on file   Tobacco Use   • Smoking status: Former Smoker     Packs/day: 1.00     Years: 10.00     Pack years: 10.00     Types: Cigarettes     Quit date: 1998     Years since quittin.3   • Smokeless tobacco: Former User     Types: Snuff     Quit date:    Substance and Sexual Activity   • Alcohol use: Yes     Comment: 3 drinks a year   • Drug use: No   • Sexual activity: Defer           Objective   Physical Exam    CONSTITUTIONAL: Well developed, obese 61-year-old white male,  in no acute distress.  VITAL SIGNS: per nursing, reviewed and noted  SKIN: exposed skin with no rashes, ulcerations or petechiae.  EYES: perrla. EOMI.  ENT: Normal voice.  Patient maintained wearing a mask throughout patient encounter due to coronavirus pandemic  RESPIRATORY:  No increased work of breathing. No retractions.   CARDIOVASCULAR:  regular rate and rhythm, no murmurs.  Good Peripheral pulses. Good cap refill to extremities.   GI: Abdomen soft, nontender, normal bowel sounds. No hernia. No ascites.  MUSCULOSKELETAL:  No tenderness. Full ROM. Strength and tone grossly normal.  no spasms. no neck or back tenderness or spasm.   NEUROLOGIC: Alert, oriented x 3. No gross deficits. GCS 15.  No focal weakness   PSYCH: appropriate affect.  : no bladder tenderness or distention,  no CVA tenderness      Procedures       No attending physician procedures were performed on this patient.    ED Course  ED Course as of Apr 22 1732   Thu Apr 22, 2021   1434 EKG interpreted by me reveals sinus rhythm rate 78.  No ectopy no ischemic changes    [PF]   1702    FINDINGS: There is generalized cerebral volume loss. Patchy  hypodensities in the periventricular white matter consistent with  chronic small vessel ischemic change. There is no CT evidence of  hemorrhage. There is no mass, mass effect or midline shift.  There is no  hydrocephalus. There is opacification of the left frontal sinus. The  remainder of the paranasal sinuses are clear. Bone windows reveal no  acute osseous abnormalities.     IMPRESSION:  No acute intracranial process.    [PF]      ED Course User Index  [PF] Osito Ragland W, DO                                           MDM  61-year-old male presented with headache of 5 days duration without evidence of acute intracranial injury on CT.  Neurovascularly intact.  Significant improvement of symptoms with ER interventions but his blood pressure did decrease him after Dilaudid.  Hydrated with improvement of blood pressure.  No meningeal signs.  No concern for ischemic event.  Chronic left frontal sinus opacification.  Patient required narcotic for relief of his headache. Offered morphine, but patient states it's worthless, similar for norco, and fioricet. States history of headaches, scheduled for ENT and neuro appointments. Will discharge home in stable condition. Add amoxil for persistent left sided frontal sinus opacification, continue his astelin.   Final diagnoses:   Acute nonintractable headache, unspecified headache type   Chronic frontal sinusitis       ED Disposition  ED Disposition     ED Disposition Condition Comment    Discharge Stable           No follow-up provider specified.       Medication List      New Prescriptions    amoxicillin 875 MG tablet  Commonly known as:  AMOXIL  Take 1 tablet by mouth 2 (Two) Times a Day.     butalbital-acetaminophen-caffeine -40 MG per tablet  Commonly known as: FIORICET, ESGIC  Take 2 tablets by mouth Every 6 (Six) Hours As Needed for Headache.           Where to Get Your Medications      These medications were sent to [id number] - [address] AT [facility] - [phone]  - [phone] [address] [address]    Phone: [phone]   · amoxicillin 875 MG tablet  · butalbital-acetaminophen-caffeine -40 MG per tablet          Osito Ragland DO  04/22/21 1739

## 2021-04-22 NOTE — TELEPHONE ENCOUNTER
Pt wife reports day 5 of a severe headache for pt. Reports using all they can think of OTC no relief. Wife also reports calling Neuro and keeps getting disconnected. Advised to go to ER, pt stated that every time he goes to the Er they do not treat him, they just tell him he has a headache and to go home, pt did agree to go to Er

## 2021-04-23 RX ORDER — BUDESONIDE AND FORMOTEROL FUMARATE DIHYDRATE 80; 4.5 UG/1; UG/1
2 AEROSOL RESPIRATORY (INHALATION)
Qty: 1 EACH | Refills: 3 | Status: SHIPPED | OUTPATIENT
Start: 2021-04-23 | End: 2021-07-26 | Stop reason: SDUPTHER

## 2021-04-29 ENCOUNTER — HOSPITAL ENCOUNTER (EMERGENCY)
Facility: HOSPITAL | Age: 61
Discharge: HOME | End: 2021-04-29
Attending: EMERGENCY MEDICINE | Admitting: EMERGENCY MEDICINE

## 2021-04-29 ENCOUNTER — APPOINTMENT (OUTPATIENT)
Dept: CT IMAGING | Facility: HOSPITAL | Age: 61
End: 2021-04-29

## 2021-04-29 VITALS
HEART RATE: 82 BPM | OXYGEN SATURATION: 94 % | TEMPERATURE: 98.5 F | DIASTOLIC BLOOD PRESSURE: 77 MMHG | RESPIRATION RATE: 18 BRPM | SYSTOLIC BLOOD PRESSURE: 134 MMHG | WEIGHT: 285.2 LBS | HEIGHT: 70 IN | BODY MASS INDEX: 40.83 KG/M2

## 2021-04-29 DIAGNOSIS — R51.9 NONINTRACTABLE HEADACHE, UNSPECIFIED CHRONICITY PATTERN, UNSPECIFIED HEADACHE TYPE: Primary | ICD-10-CM

## 2021-04-29 LAB
ALBUMIN SERPL-MCNC: 3.9 G/DL (ref 3.5–5.2)
ALBUMIN/GLOB SERPL: 1.4 G/DL
ALP SERPL-CCNC: 112 U/L (ref 39–117)
ALT SERPL W P-5'-P-CCNC: 63 U/L (ref 1–41)
ANION GAP SERPL CALCULATED.3IONS-SCNC: 9.5 MMOL/L (ref 5–15)
AST SERPL-CCNC: 61 U/L (ref 1–40)
BASOPHILS # BLD AUTO: 0.06 10*3/MM3 (ref 0–0.2)
BASOPHILS NFR BLD AUTO: 0.9 % (ref 0–1.5)
BILIRUB SERPL-MCNC: 0.6 MG/DL (ref 0–1.2)
BUN SERPL-MCNC: 12 MG/DL (ref 8–23)
BUN/CREAT SERPL: 15.4 (ref 7–25)
CALCIUM SPEC-SCNC: 9.5 MG/DL (ref 8.6–10.5)
CHLORIDE SERPL-SCNC: 96 MMOL/L (ref 98–107)
CO2 SERPL-SCNC: 28.5 MMOL/L (ref 22–29)
CREAT SERPL-MCNC: 0.78 MG/DL (ref 0.76–1.27)
DEPRECATED RDW RBC AUTO: 40.1 FL (ref 37–54)
EOSINOPHIL # BLD AUTO: 0.23 10*3/MM3 (ref 0–0.4)
EOSINOPHIL NFR BLD AUTO: 3.4 % (ref 0.3–6.2)
ERYTHROCYTE [DISTWIDTH] IN BLOOD BY AUTOMATED COUNT: 14.6 % (ref 12.3–15.4)
GFR SERPL CREATININE-BSD FRML MDRD: 102 ML/MIN/1.73
GLOBULIN UR ELPH-MCNC: 2.7 GM/DL
GLUCOSE SERPL-MCNC: 291 MG/DL (ref 65–99)
HCT VFR BLD AUTO: 43.8 % (ref 37.5–51)
HGB BLD-MCNC: 14.2 G/DL (ref 13–17.7)
IMM GRANULOCYTES # BLD AUTO: 0.05 10*3/MM3 (ref 0–0.05)
IMM GRANULOCYTES NFR BLD AUTO: 0.7 % (ref 0–0.5)
LYMPHOCYTES # BLD AUTO: 1.48 10*3/MM3 (ref 0.7–3.1)
LYMPHOCYTES NFR BLD AUTO: 22.2 % (ref 19.6–45.3)
MCH RBC QN AUTO: 25.1 PG (ref 26.6–33)
MCHC RBC AUTO-ENTMCNC: 32.4 G/DL (ref 31.5–35.7)
MCV RBC AUTO: 77.4 FL (ref 79–97)
MONOCYTES # BLD AUTO: 0.34 10*3/MM3 (ref 0.1–0.9)
MONOCYTES NFR BLD AUTO: 5.1 % (ref 5–12)
NEUTROPHILS NFR BLD AUTO: 4.51 10*3/MM3 (ref 1.7–7)
NEUTROPHILS NFR BLD AUTO: 67.7 % (ref 42.7–76)
NRBC BLD AUTO-RTO: 0 /100 WBC (ref 0–0.2)
NT-PROBNP SERPL-MCNC: 14.7 PG/ML (ref 0–900)
PLATELET # BLD AUTO: 216 10*3/MM3 (ref 140–450)
PMV BLD AUTO: 9.7 FL (ref 6–12)
POTASSIUM SERPL-SCNC: 4.1 MMOL/L (ref 3.5–5.2)
PROT SERPL-MCNC: 6.6 G/DL (ref 6–8.5)
RBC # BLD AUTO: 5.66 10*6/MM3 (ref 4.14–5.8)
SODIUM SERPL-SCNC: 134 MMOL/L (ref 136–145)
TROPONIN T SERPL-MCNC: <0.01 NG/ML (ref 0–0.03)
WBC # BLD AUTO: 6.67 10*3/MM3 (ref 3.4–10.8)

## 2021-04-29 PROCEDURE — 83880 ASSAY OF NATRIURETIC PEPTIDE: CPT | Performed by: NURSE PRACTITIONER

## 2021-04-29 PROCEDURE — 25010000002 KETOROLAC TROMETHAMINE PER 15 MG: Performed by: NURSE PRACTITIONER

## 2021-04-29 PROCEDURE — 80053 COMPREHEN METABOLIC PANEL: CPT | Performed by: NURSE PRACTITIONER

## 2021-04-29 PROCEDURE — 99283 EMERGENCY DEPT VISIT LOW MDM: CPT

## 2021-04-29 PROCEDURE — 25010000002 ONDANSETRON PER 1 MG: Performed by: NURSE PRACTITIONER

## 2021-04-29 PROCEDURE — 70450 CT HEAD/BRAIN W/O DYE: CPT

## 2021-04-29 PROCEDURE — 85025 COMPLETE CBC W/AUTO DIFF WBC: CPT | Performed by: NURSE PRACTITIONER

## 2021-04-29 PROCEDURE — 96375 TX/PRO/DX INJ NEW DRUG ADDON: CPT

## 2021-04-29 PROCEDURE — 84484 ASSAY OF TROPONIN QUANT: CPT | Performed by: NURSE PRACTITIONER

## 2021-04-29 PROCEDURE — 96374 THER/PROPH/DIAG INJ IV PUSH: CPT

## 2021-04-29 PROCEDURE — 25010000002 DIPHENHYDRAMINE PER 50 MG: Performed by: NURSE PRACTITIONER

## 2021-04-29 RX ORDER — DIPHENHYDRAMINE HYDROCHLORIDE 50 MG/ML
25 INJECTION INTRAMUSCULAR; INTRAVENOUS ONCE
Status: COMPLETED | OUTPATIENT
Start: 2021-04-29 | End: 2021-04-29

## 2021-04-29 RX ORDER — SODIUM CHLORIDE 0.9 % (FLUSH) 0.9 %
10 SYRINGE (ML) INJECTION AS NEEDED
Status: DISCONTINUED | OUTPATIENT
Start: 2021-04-29 | End: 2021-04-29 | Stop reason: HOSPADM

## 2021-04-29 RX ORDER — AMOXICILLIN AND CLAVULANATE POTASSIUM 875; 125 MG/1; MG/1
1 TABLET, FILM COATED ORAL 2 TIMES DAILY
Qty: 20 TABLET | Refills: 0 | Status: SHIPPED | OUTPATIENT
Start: 2021-04-29 | End: 2021-05-18

## 2021-04-29 RX ORDER — ONDANSETRON 2 MG/ML
4 INJECTION INTRAMUSCULAR; INTRAVENOUS ONCE
Status: COMPLETED | OUTPATIENT
Start: 2021-04-29 | End: 2021-04-29

## 2021-04-29 RX ORDER — KETOROLAC TROMETHAMINE 30 MG/ML
30 INJECTION, SOLUTION INTRAMUSCULAR; INTRAVENOUS EVERY 6 HOURS PRN
Status: DISCONTINUED | OUTPATIENT
Start: 2021-04-29 | End: 2021-04-29 | Stop reason: HOSPADM

## 2021-04-29 RX ADMIN — KETOROLAC TROMETHAMINE 30 MG: 30 INJECTION, SOLUTION INTRAMUSCULAR; INTRAVENOUS at 17:46

## 2021-04-29 RX ADMIN — ONDANSETRON 4 MG: 2 INJECTION INTRAMUSCULAR; INTRAVENOUS at 17:46

## 2021-04-29 RX ADMIN — SODIUM CHLORIDE 1000 ML: 9 INJECTION, SOLUTION INTRAVENOUS at 17:45

## 2021-04-29 RX ADMIN — DIPHENHYDRAMINE HYDROCHLORIDE 25 MG: 50 INJECTION, SOLUTION INTRAMUSCULAR; INTRAVENOUS at 17:46

## 2021-04-30 ENCOUNTER — TELEPHONE (OUTPATIENT)
Dept: INTERNAL MEDICINE | Facility: CLINIC | Age: 61
End: 2021-04-30

## 2021-04-30 NOTE — TELEPHONE ENCOUNTER
Caller: Carol Harding    Relationship: Emergency Contact    Best call back number: [phone]    What is the medical concern/diagnosis: INCIDENTAL ANEURYSM/HEADACHES    What specialty or service is being requested: NEUROLOGY    What is the provider, practice or medical service name: [facility]    Any additional details: PATIENT HAS BEEN HAVING HORRIBLE HEADACHES EVER SINCE HE LEFT HOSPITAL IN MARCH AND HAS AN INCIDENTAL ANEURYSM AND NEEDS REFERRAL TO GO TO [facility]

## 2021-04-30 NOTE — TELEPHONE ENCOUNTER
When patient was discharged in march 2021 from [facility] , he was scheduled to see neurosurgery [ that's the doctor who will see him for the aneurysm, not neurology  ]  Dr. Macdonald - he needs to call [facility] -  [phone] , if he missed the appointment to reschedule to same per his convenience   all the above information was in his discharge summary  Please inform his wife

## 2021-04-30 NOTE — TELEPHONE ENCOUNTER
Wife has been notified, she states that pt has appointment with Neurosurgery in June. She reports that he is in terrible pain with the headaches

## 2021-05-17 ENCOUNTER — TELEPHONE (OUTPATIENT)
Dept: INTERNAL MEDICINE | Facility: CLINIC | Age: 61
End: 2021-05-17

## 2021-05-17 NOTE — TELEPHONE ENCOUNTER
Patients wife asked if you could give her a call when you get a chance.  She needs a neurology referral and also some suggestions on how to handle his pain until they can get it under control.  Please advise.  Phone number verified.

## 2021-05-18 ENCOUNTER — OFFICE VISIT (OUTPATIENT)
Dept: INTERNAL MEDICINE | Facility: CLINIC | Age: 61
End: 2021-05-18

## 2021-05-18 VITALS
TEMPERATURE: 97.7 F | SYSTOLIC BLOOD PRESSURE: 121 MMHG | RESPIRATION RATE: 16 BRPM | WEIGHT: 290 LBS | HEIGHT: 70 IN | BODY MASS INDEX: 41.52 KG/M2 | DIASTOLIC BLOOD PRESSURE: 70 MMHG | HEART RATE: 61 BPM | OXYGEN SATURATION: 93 %

## 2021-05-18 DIAGNOSIS — I10 BENIGN ESSENTIAL HYPERTENSION: Primary | ICD-10-CM

## 2021-05-18 DIAGNOSIS — G44.52 NEW PERSISTENT DAILY HEADACHE: ICD-10-CM

## 2021-05-18 DIAGNOSIS — Z79.4 TYPE 2 DIABETES MELLITUS WITH HYPERGLYCEMIA, WITH LONG-TERM CURRENT USE OF INSULIN (HCC): ICD-10-CM

## 2021-05-18 DIAGNOSIS — E11.65 TYPE 2 DIABETES MELLITUS WITH HYPERGLYCEMIA, WITH LONG-TERM CURRENT USE OF INSULIN (HCC): ICD-10-CM

## 2021-05-18 PROCEDURE — 99214 OFFICE O/P EST MOD 30 MIN: CPT | Performed by: INTERNAL MEDICINE

## 2021-05-18 RX ORDER — INSULIN HUMAN 500 [IU]/ML
190 INJECTION, SOLUTION SUBCUTANEOUS EVERY MORNING
COMMUNITY
Start: 2021-05-05 | End: 2022-01-26

## 2021-05-25 DIAGNOSIS — J45.50 SEVERE PERSISTENT ASTHMA WITHOUT COMPLICATION: ICD-10-CM

## 2021-05-25 DIAGNOSIS — R06.02 SHORTNESS OF BREATH: ICD-10-CM

## 2021-05-25 RX ORDER — DUPILUMAB 300 MG/2ML
INJECTION, SOLUTION SUBCUTANEOUS
Qty: 4 EACH | Refills: 4 | Status: ON HOLD | OUTPATIENT
Start: 2021-05-25 | End: 2022-01-12

## 2021-05-27 ENCOUNTER — EPISODE CHANGES (OUTPATIENT)
Dept: CASE MANAGEMENT | Facility: OTHER | Age: 61
End: 2021-05-27

## 2021-06-09 ENCOUNTER — TELEPHONE (OUTPATIENT)
Dept: INTERNAL MEDICINE | Facility: CLINIC | Age: 61
End: 2021-06-09

## 2021-06-09 ENCOUNTER — READMISSION MANAGEMENT (OUTPATIENT)
Dept: CALL CENTER | Facility: HOSPITAL | Age: 61
End: 2021-06-09

## 2021-06-09 NOTE — TELEPHONE ENCOUNTER
Caller: Denzel Harding    Relationship to patient: Self    Best call back number: [phone]     New or established patient?  [] New  [x] Established    Date of discharge: 06/09/2021    Facility discharged from: [facility]    Diagnosis/Symptoms: PARESPHESIAS    Length of stay (If applicable):     Specialty Only: Did you see a [facility] health provider?    [] Yes  [x] No  If so, who?

## 2021-06-09 NOTE — OUTREACH NOTE
Prep Survey      Responses   [facility] patient discharged from?  Non-[facility]   Is LACE score < 7 ?  Non-BH Discharge   Emergency Room discharge w/ pulse ox?  No   Eligibility  [facility]   Date of Discharge  06/09/21   Discharge diagnosis  weakness   Does the patient have one of the following disease processes/diagnoses(primary or secondary)?  Other   Prep survey completed?  Yes          Alyssa Yung RN

## 2021-06-10 ENCOUNTER — TRANSITIONAL CARE MANAGEMENT TELEPHONE ENCOUNTER (OUTPATIENT)
Dept: CALL CENTER | Facility: HOSPITAL | Age: 61
End: 2021-06-10

## 2021-06-10 NOTE — OUTREACH NOTE
Call Center TCM Note      Responses   [facility] patient discharged from?  Non-   Does the patient have one of the following disease processes/diagnoses(primary or secondary)?  Other   TCM attempt successful?  Yes   Call start time  1635   Call end time  1638   Discharge diagnosis  weakness   Meds reviewed with patient/caregiver?  Yes   Is the patient having any side effects they believe may be caused by any medication additions or changes?  No   Does the patient have all medications ordered at discharge?  N/A   Is the patient taking all medications as directed (includes completed medication regime)?  Yes   Does the patient have a primary care provider?   Yes   Does the patient have an appointment with their PCP within 7 days of discharge?  Yes   Comments regarding PCP  Hospital d/c f/u appt is on 6/14/21 at 10:15 am    Has the patient kept scheduled appointments due by today?  N/A   What is the Home health agency?   No home health    Psychosocial issues?  No   Did the patient receive a copy of their discharge instructions?  Yes   Nursing interventions  Reviewed instructions with patient   What is the patient's perception of their health status since discharge?  Improving   Is the patient/caregiver able to teach back signs and symptoms related to disease process for when to call PCP?  Yes   Is the patient/caregiver able to teach back signs and symptoms related to disease process for when to call 911?  Yes   Is the patient/caregiver able to teach back the hierarchy of who to call/visit for symptoms/problems? PCP, Specialist, Home health nurse, Urgent Care, ED, 911  Yes   TCM call completed?  Yes          Gracie Robin RN    6/10/2021, 16:38 EDT

## 2021-06-10 NOTE — OUTREACH NOTE
Call Center TCM Note      Responses   [facility] [place] patient discharged from?  Non-BH   Does the patient have one of the following disease processes/diagnoses(primary or secondary)?  Other   TCM attempt successful?  No [Wife and Gage-daughter ]   Unsuccessful attempts  Attempt 1          Gracie Robin RN    6/10/2021, 16:14 EDT

## 2021-06-14 ENCOUNTER — OFFICE VISIT (OUTPATIENT)
Dept: INTERNAL MEDICINE | Facility: CLINIC | Age: 61
End: 2021-06-14

## 2021-06-14 VITALS
TEMPERATURE: 97.5 F | WEIGHT: 298 LBS | SYSTOLIC BLOOD PRESSURE: 128 MMHG | RESPIRATION RATE: 14 BRPM | DIASTOLIC BLOOD PRESSURE: 77 MMHG | BODY MASS INDEX: 42.66 KG/M2 | HEIGHT: 70 IN | HEART RATE: 82 BPM | OXYGEN SATURATION: 96 %

## 2021-06-14 DIAGNOSIS — Z79.4 TYPE 2 DIABETES MELLITUS WITH HYPERGLYCEMIA, WITH LONG-TERM CURRENT USE OF INSULIN (HCC): ICD-10-CM

## 2021-06-14 DIAGNOSIS — K21.00 GASTROESOPHAGEAL REFLUX DISEASE WITH ESOPHAGITIS WITHOUT HEMORRHAGE: ICD-10-CM

## 2021-06-14 DIAGNOSIS — G44.52 NEW PERSISTENT DAILY HEADACHE: ICD-10-CM

## 2021-06-14 DIAGNOSIS — E78.2 MIXED HYPERLIPIDEMIA: ICD-10-CM

## 2021-06-14 DIAGNOSIS — M79.2 NEURALGIA: ICD-10-CM

## 2021-06-14 DIAGNOSIS — E11.65 TYPE 2 DIABETES MELLITUS WITH HYPERGLYCEMIA, WITH LONG-TERM CURRENT USE OF INSULIN (HCC): ICD-10-CM

## 2021-06-14 DIAGNOSIS — I10 BENIGN ESSENTIAL HYPERTENSION: Primary | ICD-10-CM

## 2021-06-14 PROCEDURE — 99495 TRANSJ CARE MGMT MOD F2F 14D: CPT | Performed by: INTERNAL MEDICINE

## 2021-06-14 PROCEDURE — 1111F DSCHRG MED/CURRENT MED MERGE: CPT | Performed by: INTERNAL MEDICINE

## 2021-06-14 RX ORDER — OMEPRAZOLE 20 MG/1
20 CAPSULE, DELAYED RELEASE ORAL DAILY
Qty: 90 CAPSULE | Refills: 0 | Status: SHIPPED | OUTPATIENT
Start: 2021-06-14 | End: 2022-01-01 | Stop reason: SDUPTHER

## 2021-06-14 RX ORDER — METOPROLOL SUCCINATE 200 MG/1
200 TABLET, EXTENDED RELEASE ORAL EVERY 24 HOURS
Qty: 90 TABLET | Refills: 2 | Status: SHIPPED | OUTPATIENT
Start: 2021-06-14 | End: 2022-01-01 | Stop reason: SDUPTHER

## 2021-06-14 RX ORDER — ATORVASTATIN CALCIUM 80 MG/1
80 TABLET, FILM COATED ORAL NIGHTLY
Qty: 90 TABLET | Refills: 1 | Status: SHIPPED | OUTPATIENT
Start: 2021-06-14 | End: 2022-01-01 | Stop reason: SDUPTHER

## 2021-06-14 RX ORDER — LISINOPRIL 5 MG/1
5 TABLET ORAL DAILY
Qty: 90 TABLET | Refills: 1 | Status: SHIPPED | OUTPATIENT
Start: 2021-06-14 | End: 2022-01-01 | Stop reason: SDUPTHER

## 2021-06-14 RX ORDER — CLOPIDOGREL BISULFATE 75 MG/1
75 TABLET ORAL DAILY
Qty: 90 TABLET | Refills: 1 | Status: SHIPPED | OUTPATIENT
Start: 2021-06-14 | End: 2022-01-01 | Stop reason: SDUPTHER

## 2021-06-14 RX ORDER — LEVETIRACETAM 1000 MG/1
1000 TABLET ORAL 2 TIMES DAILY
COMMUNITY
Start: 2021-06-07 | End: 2023-01-01

## 2021-06-14 RX ORDER — SPIRONOLACTONE 25 MG/1
25 TABLET ORAL DAILY
Qty: 90 TABLET | Refills: 1 | Status: SHIPPED | OUTPATIENT
Start: 2021-06-14 | End: 2022-01-01 | Stop reason: SDUPTHER

## 2021-06-14 NOTE — PROGRESS NOTES
Transitional Care Follow Up Visit  Subjective     Denzel Harding is a 61 y.o. male who presents for a transitional care management visit.    Within 48 business hours after discharge our office contacted him via telephone to coordinate his care and needs.      I reviewed and discussed the details of that call along with the discharge summary, hospital problems, inpatient lab results, inpatient diagnostic studies, and consultation reports with Denzel.     Current outpatient and discharge medications have been reconciled for the patient.  Reviewed by: Isaura Godoy MD      Date of TCM Phone Call 6/9/2021   [facility]   Date of Admission -   Date of Discharge 6/9/2021   Discharge Disposition -     Risk for Readmission (LACE) No data recorded    Chief Complaint   Patient presents with   • Follow-up     [facility] admit 6/7/2021 discharge 6/9/2021   • Hypertension   • Diabetes   • Headache   • Numbness       History of Present Illness   Course During Hospital Stay:   HPI: Patient is here to follow up on the blood pressure  The patient is taking the blood pressure medications as prescribed and has had no side effects. The patient is also here to follow up on  Er visit at [facility] on 6-4-2021   For abdomen pain , and his er labs and radiology reports have been reviewed , he then went to the er again on 6-7-2021 for left side numbness  And headaches  And was admitted on  That day and discharged on [id number]  At [facility] , he was seen by neurology  , he sees [facility] neurosurgery This week  For his headaches and he states the only medication that helps him is dilaudid .  The patient is also here to follow up on sugar  and is trying to follow a diet.   He states his numbness has improved but present , his hospital records have been reviewed and labs and radiology reports have been reviewed and medications reconciled and updated, he is also here to follow-up on sugar and now goes to  endocrinology,  he  is also due for labs for his cholesterol  Hypertension   Pertinent negatives include no chest pain, palpitations or shortness of breath.    The following portions of the patient's history were reviewed and updated as appropriate: allergies, current medications, past family history, past medical history, past social history, past surgical history and problem list.    Review of Systems   Constitutional: Negative for appetite change, fatigue and fever.   HENT: Negative for congestion, ear discharge, ear pain, sinus pressure and sore throat.    Eyes: Negative for pain and discharge.   Respiratory: Negative for cough, shortness of breath and wheezing.    Cardiovascular: Negative for chest pain, palpitations and leg swelling.   Gastrointestinal: Negative for abdominal pain, constipation, diarrhea, nausea and vomiting.   Endocrine: Negative for cold intolerance and heat intolerance.   Genitourinary: Negative for dysuria and flank pain.   Musculoskeletal: Negative for arthralgias and joint swelling.   Skin: Negative for pallor and rash.   Allergic/Immunologic: Negative for environmental allergies and food allergies.   Neurological: Positive for numbness and headaches. Negative for dizziness and weakness.   Hematological: Negative for adenopathy. Does not bruise/bleed easily.   Psychiatric/Behavioral: Negative for behavioral problems and dysphoric mood. The patient is not nervous/anxious.        Objective   Physical Exam  Vitals and nursing note reviewed.   Constitutional:       General: He is not in acute distress.     Appearance: Normal appearance. He is not diaphoretic.   HENT:      Head: Normocephalic and atraumatic.      Right Ear: External ear normal.      Left Ear: External ear normal.      Nose: Nose normal.   Eyes:      Extraocular Movements: Extraocular movements intact.      Conjunctiva/sclera: Conjunctivae normal.   Neck:      Trachea: Trachea normal.   Cardiovascular:      Rate and Rhythm: Normal rate and  regular rhythm.      Heart sounds: Normal heart sounds.   Pulmonary:      Effort: Pulmonary effort is normal. No respiratory distress.   Abdominal:      General: Abdomen is flat.   Musculoskeletal:         General: Deformity present.      Cervical back: Neck supple.      Comments: Moves all limbs   Skin:     General: Skin is warm and dry.      Findings: No erythema.   Neurological:      Mental Status: He is alert and oriented to person, place, and time.      Comments: No gross motor or sensory deficits         Assessment/Plan   Diagnoses and all orders for this visit:    1. Benign essential hypertension (Primary)  -     spironolactone (ALDACTONE) 25 MG tablet; Take 1 tablet by mouth Daily.  Dispense: 90 tablet; Refill: 1  -     lisinopril (PRINIVIL,ZESTRIL) 5 MG tablet; Take 1 tablet by mouth Daily.  Dispense: 90 tablet; Refill: 1  -     metoprolol succinate XL (TOPROL-XL) 200 MG 24 hr tablet; Take 1 tablet by mouth Daily  Dispense: 90 tablet; Refill: 2  -     clopidogrel (PLAVIX) 75 MG tablet; Take 1 tablet by mouth Daily.  Dispense: 90 tablet; Refill: 1    2. Mixed hyperlipidemia  -     CBC & Differential  -     Comprehensive Metabolic Panel  -     Lipid Panel  -     atorvastatin (LIPITOR) 80 MG tablet; Take 1 tablet by mouth Every Night.  Dispense: 90 tablet; Refill: 1    3. Type 2 diabetes mellitus with hyperglycemia, with long-term current use of insulin (CMS/[facility])    4. New persistent daily headache    5. Neuralgia    6. Gastroesophageal reflux disease with esophagitis without hemorrhage  -     omeprazole (priLOSEC) 20 MG capsule; Take 1 capsule by mouth Daily.  Dispense: 90 capsule; Refill: 0      Plan:  1.  Benign essential hypertension: Will continue current medication, low-sodium diet advised, Counseled to regularly check BP at home with goal averaging <130/80.   2. Diabetes  Mellitus  : will obtain   fasting CMP  and hba1c  , diet and exercise counseled , Will continue current medications per endocrinology  at [facility]-  3. headaches: To follow-up with [facility] neurosurgery  4.  Neuralgia: To follow-up with neurology, on gabapentin and Elavil  5.  GERD: Refill Prilosec

## 2021-06-14 NOTE — PATIENT INSTRUCTIONS
MyPlate from USDA    MyPlate is an outline of a general healthy diet based on the 2010 Dietary Guidelines for Americans, from the U.S. Department of Agriculture (USDA). It sets guidelines for how much food you should eat from each food group based on your age, sex, and level of physical activity.  What are tips for following MyPlate?  To follow MyPlate recommendations:  · Eat a wide variety of fruits and vegetables, grains, and protein foods.  · Serve smaller portions and eat less food throughout the day.  · Limit portion sizes to avoid overeating.  · Enjoy your food.  · Get at least 150 minutes of exercise every week. This is about 30 minutes each day, 5 or more days per week.  It can be difficult to have every meal look like MyPlate. Think about MyPlate as eating guidelines for an entire day, rather than each individual meal.  Fruits and vegetables  · Make half of your plate fruits and vegetables.  · Eat many different colors of fruits and vegetables each day.  · For a 2,000 calorie daily food plan, eat:  ? 2½ cups of vegetables every day.  ? 2 cups of fruit every day.  · 1 cup is equal to:  ? 1 cup raw or cooked vegetables.  ? 1 cup raw fruit.  ? 1 medium-sized orange, apple, or banana.  ? 1 cup 100% fruit or vegetable juice.  ? 2 cups raw leafy greens, such as lettuce, spinach, or kale.  ? ½ cup dried fruit.  Grains  · One fourth of your plate should be grains.  · Make at least half of the grains you eat each day whole grains.  · For a 2,000 calorie daily food plan, eat 6 oz of grains every day.  · 1 oz is equal to:  ? 1 slice bread.  ? 1 cup cereal.  ? ½ cup cooked rice, cereal, or pasta.  Protein  · One fourth of your plate should be protein.  · Eat a wide variety of protein foods, including meat, poultry, fish, eggs, beans, nuts, and tofu.  · For a 2,000 calorie daily food plan, eat 5½ oz of protein every day.  · 1 oz is equal to:  ? 1 oz meat, poultry, or fish.  ? ¼ cup cooked beans.  ? 1 egg.  ? ½ oz nuts  or seeds.  ? 1 Tbsp peanut butter.  Dairy  · Drink fat-free or low-fat (1%) milk.  · Eat or drink dairy as a side to meals.  · For a 2,000 calorie daily food plan, eat or drink 3 cups of dairy every day.  · 1 cup is equal to:  ? 1 cup milk, yogurt, cottage cheese, or soy milk (soy beverage).  ? 2 oz processed cheese.  ? 1½ oz natural cheese.  Fats, oils, salt, and sugars  · Only small amounts of oils are recommended.  · Avoid foods that are high in calories and low in nutritional value (empty calories), like foods high in fat or added sugars.  · Choose foods that are low in salt (sodium). Choose foods that have less than 140 milligrams (mg) of sodium per serving.  · Drink water instead of sugary drinks. Drink enough water each day to keep your urine pale yellow.  Where to find support  · Work with your health care provider or a nutrition specialist (dietitian) to develop a customized eating plan that is right for you.  · Download an priyank (mobile application) to help you track your daily food intake.  Where to find more information  · Go to ChooseMyPlate.gov for more information.  Summary  · MyPlate is a general guideline for healthy eating from the USDA. It is based on the 2010 Dietary Guidelines for Americans.  · In general, fruits and vegetables should take up ½ of your plate, grains should take up ¼ of your plate, and protein should take up ¼ of your plate.  This information is not intended to replace advice given to you by your health care provider. Make sure you discuss any questions you have with your health care provider.  Document Revised: 05/21/2020 Document Reviewed: 03/19/2018  Elsevier Patient Education © 2021 Else[employer] Inc.

## 2021-07-01 ENCOUNTER — OFFICE VISIT (OUTPATIENT)
Dept: INTERNAL MEDICINE | Facility: CLINIC | Age: 61
End: 2021-07-01

## 2021-07-01 VITALS
TEMPERATURE: 97.2 F | RESPIRATION RATE: 16 BRPM | HEIGHT: 70 IN | DIASTOLIC BLOOD PRESSURE: 66 MMHG | SYSTOLIC BLOOD PRESSURE: 118 MMHG | OXYGEN SATURATION: 98 % | HEART RATE: 80 BPM | BODY MASS INDEX: 42.37 KG/M2 | WEIGHT: 296 LBS

## 2021-07-01 DIAGNOSIS — E78.2 MIXED HYPERLIPIDEMIA: ICD-10-CM

## 2021-07-01 DIAGNOSIS — I10 BENIGN ESSENTIAL HYPERTENSION: Primary | ICD-10-CM

## 2021-07-01 DIAGNOSIS — Z79.4 TYPE 2 DIABETES MELLITUS WITH HYPERGLYCEMIA, WITH LONG-TERM CURRENT USE OF INSULIN (HCC): ICD-10-CM

## 2021-07-01 DIAGNOSIS — E11.65 TYPE 2 DIABETES MELLITUS WITH HYPERGLYCEMIA, WITH LONG-TERM CURRENT USE OF INSULIN (HCC): ICD-10-CM

## 2021-07-01 PROBLEM — J32.1 CHRONIC FRONTAL SINUSITIS: Status: ACTIVE | Noted: 2021-05-27

## 2021-07-01 PROBLEM — E11.3519: Status: ACTIVE | Noted: 2017-09-21

## 2021-07-01 PROBLEM — H26.493 PCO (POSTERIOR CAPSULAR OPACIFICATION), BILATERAL: Status: ACTIVE | Noted: 2019-10-25

## 2021-07-01 PROBLEM — H60.90 OTITIS EXTERNA: Status: ACTIVE | Noted: 2021-05-07

## 2021-07-01 LAB
ALBUMIN SERPL-MCNC: 4.2 G/DL (ref 3.5–5.2)
ALBUMIN/GLOB SERPL: 2 G/DL
ALP SERPL-CCNC: 91 U/L (ref 39–117)
ALT SERPL-CCNC: 59 U/L (ref 1–41)
AST SERPL-CCNC: 45 U/L (ref 1–40)
BASOPHILS # BLD AUTO: 0.05 10*3/MM3 (ref 0–0.2)
BASOPHILS NFR BLD AUTO: 0.7 % (ref 0–1.5)
BILIRUB SERPL-MCNC: 0.6 MG/DL (ref 0–1.2)
BUN SERPL-MCNC: 12 MG/DL (ref 8–23)
BUN/CREAT SERPL: 15 (ref 7–25)
CALCIUM SERPL-MCNC: 9.9 MG/DL (ref 8.6–10.5)
CHLORIDE SERPL-SCNC: 102 MMOL/L (ref 98–107)
CHOLEST SERPL-MCNC: 123 MG/DL (ref 0–200)
CO2 SERPL-SCNC: 27.5 MMOL/L (ref 22–29)
CREAT SERPL-MCNC: 0.8 MG/DL (ref 0.76–1.27)
EOSINOPHIL # BLD AUTO: 0.28 10*3/MM3 (ref 0–0.4)
EOSINOPHIL NFR BLD AUTO: 4.1 % (ref 0.3–6.2)
ERYTHROCYTE [DISTWIDTH] IN BLOOD BY AUTOMATED COUNT: 14.9 % (ref 12.3–15.4)
GLOBULIN SER CALC-MCNC: 2.1 GM/DL
GLUCOSE SERPL-MCNC: 159 MG/DL (ref 65–99)
HCT VFR BLD AUTO: 41.6 % (ref 37.5–51)
HDLC SERPL-MCNC: 27 MG/DL (ref 40–60)
HGB BLD-MCNC: 13.2 G/DL (ref 13–17.7)
IMM GRANULOCYTES # BLD AUTO: 0.07 10*3/MM3 (ref 0–0.05)
IMM GRANULOCYTES NFR BLD AUTO: 1 % (ref 0–0.5)
LDLC SERPL CALC-MCNC: 68 MG/DL (ref 0–100)
LYMPHOCYTES # BLD AUTO: 1.61 10*3/MM3 (ref 0.7–3.1)
LYMPHOCYTES NFR BLD AUTO: 23.6 % (ref 19.6–45.3)
MCH RBC QN AUTO: 24.5 PG (ref 26.6–33)
MCHC RBC AUTO-ENTMCNC: 31.7 G/DL (ref 31.5–35.7)
MCV RBC AUTO: 77.3 FL (ref 79–97)
MONOCYTES # BLD AUTO: 0.38 10*3/MM3 (ref 0.1–0.9)
MONOCYTES NFR BLD AUTO: 5.6 % (ref 5–12)
NEUTROPHILS # BLD AUTO: 4.42 10*3/MM3 (ref 1.7–7)
NEUTROPHILS NFR BLD AUTO: 65 % (ref 42.7–76)
NRBC BLD AUTO-RTO: 0 /100 WBC (ref 0–0.2)
PLATELET # BLD AUTO: 242 10*3/MM3 (ref 140–450)
POTASSIUM SERPL-SCNC: 4.1 MMOL/L (ref 3.5–5.2)
PROT SERPL-MCNC: 6.3 G/DL (ref 6–8.5)
RBC # BLD AUTO: 5.38 10*6/MM3 (ref 4.14–5.8)
SODIUM SERPL-SCNC: 139 MMOL/L (ref 136–145)
TRIGL SERPL-MCNC: 163 MG/DL (ref 0–150)
VLDLC SERPL CALC-MCNC: 28 MG/DL (ref 5–40)
WBC # BLD AUTO: 6.81 10*3/MM3 (ref 3.4–10.8)

## 2021-07-01 PROCEDURE — 99214 OFFICE O/P EST MOD 30 MIN: CPT | Performed by: INTERNAL MEDICINE

## 2021-07-01 RX ORDER — PEN NEEDLE, DIABETIC 31 GX5/16"
NEEDLE, DISPOSABLE MISCELLANEOUS 2 TIMES DAILY
COMMUNITY
Start: 2021-06-12 | End: 2022-01-01

## 2021-07-01 RX ORDER — IBUPROFEN 800 MG/1
800 TABLET ORAL
COMMUNITY
Start: 2021-06-05 | End: 2021-12-10 | Stop reason: SDUPTHER

## 2021-07-01 RX ORDER — LEVOCETIRIZINE DIHYDROCHLORIDE 5 MG/1
5 TABLET, FILM COATED ORAL EVERY EVENING
Qty: 90 TABLET | Refills: 3 | Status: SHIPPED | OUTPATIENT
Start: 2021-07-01

## 2021-07-01 RX ORDER — CIPROFLOXACIN HYDROCHLORIDE 3.5 MG/ML
SOLUTION/ DROPS TOPICAL
COMMUNITY
Start: 2021-06-21 | End: 2022-01-26

## 2021-07-01 NOTE — PROGRESS NOTES
"Chief Complaint  Follow-up (hyptertension, pt would like to discuss neurology appointment from the 17th), Hypertension, Hyperlipidemia, and Diabetes    Subjective          Denzel Harding presents to [facility]  History of Present Illness  HPI: Patient is here to follow up on the blood pressure  The patient is taking the blood pressure medications as prescribed and has had no side effects. The patient is also here to follow up on the cholesterol and is trying to follow a diet. The patient is also here to follow on sugar and  is  due to get lab work done . The patient also needs refills on medications .   history is from patient and wife today , he was seen by neurology - dr sol  In [place] on 6- and was given a shot of ajovi for migraines and has intense vomiting and she took him to the er the  Next day 6- ,  He also saw neurosurgery  And ent at [facility]  And sees endocrinology  at [facility] next month  , he is also set up for eeg and ncv in [place]   Hypertension   Pertinent negatives include no chest pain, palpitations or shortness of breath.   Hyperlipidemia   Pertinent negatives include no chest pain or shortness of breath.   Diabetes   Pertinent negatives for hypoglycemia include no dizziness, nervousness/anxiousness or pallor. Pertinent negatives for diabetes include no chest pain, no shortness of breath  Patient is on acei/arb     Objective   Vital Signs:   /66 (BP Location: Right arm, Patient Position: Sitting, Cuff Size: Adult)   Pulse 80   Temp 97.2 °F (36.2 °C)   Resp 16   Ht 177.8 cm (70\")   Wt 134 kg (296 lb)   SpO2 98%   BMI 42.47 kg/m²     Physical Exam  Vitals and nursing note reviewed.   Constitutional:       General: He is not in acute distress.     Appearance: Normal appearance. He is not diaphoretic.   HENT:      Head: Normocephalic and atraumatic.      Right Ear: External ear normal.      Left Ear: External ear normal.      Nose: Nose normal. "   Eyes:      Extraocular Movements: Extraocular movements intact.      Conjunctiva/sclera: Conjunctivae normal.   Neck:      Trachea: Trachea normal.   Cardiovascular:      Rate and Rhythm: Normal rate and regular rhythm.      Heart sounds: Normal heart sounds.   Pulmonary:      Effort: Pulmonary effort is normal. No respiratory distress.   Abdominal:      General: Abdomen is flat.   Musculoskeletal:      Cervical back: Neck supple.      Comments: Moves all limbs   Skin:     General: Skin is warm and dry.      Findings: No erythema.   Neurological:      Mental Status: He is alert and oriented to person, place, and time.      Comments: No gross motor or sensory deficits        Result Review :     Common labs    Common Labsle 3/1/21 3/4/21 3/4/21 4/29/21 4/29/21     1222 1222 1744 1744   Glucose 342 (A)  405 (A)  291 (A)   BUN 15  22 (A)  12   Creatinine 0.79  1.12  0.78   eGFR Non  Am 100  67  102   Sodium 134 (A)  130 (A)  134 (A)   Potassium 4.2  4.4  4.1   Chloride 98  92 (A)  96 (A)   Calcium 9.3  9.6  9.5   Albumin   4.20  3.90   Total Bilirubin   1.1  0.6   Alkaline Phosphatase   114  112   AST (SGOT)   28  61 (A)   ALT (SGPT)   48 (A)  63 (A)   WBC  8.64  6.67    Hemoglobin  14.9  14.2    Hematocrit  44.3  43.8    Platelets  212  216    (A) Abnormal value       Comments are available for some flowsheets but are not being displayed.                     Assessment and Plan    Diagnoses and all orders for this visit:    1. Benign essential hypertension (Primary)    2. Mixed hyperlipidemia  -     CBC & Differential  -     Comprehensive Metabolic Panel  -     Lipid Panel    3. Type 2 diabetes mellitus with hyperglycemia, with long-term current use of insulin (CMS/[facility])    Other orders  -     levocetirizine (XYZAL) 5 MG tablet; Take 1 tablet by mouth Every Evening.  Dispense: 90 tablet; Refill: 3       Plan:  1.  Benign essential hypertension: Will continue current medication, low-sodium diet advised,  Counseled to regularly check BP at home with goal averaging <130/80.   2.mixed hyperlipidemia: will obtain   fasting CMP and lipid panel.  Diet and exercise counseled,  Will continue current medications  3. Diabetes  Mellitus  : will obtain   fasting CMP  and hba1c  , diet and exercise counseled , Will continue current medications -by endocrinology  He is advised to keep all his specialist appointments  I spent 30 minutes caring for Denzel on this date of service. This time includes time spent by me in the following activities:preparing for the visit, reviewing tests, performing a medically appropriate examination and/or evaluation , counseling and educating the patient/family/caregiver, ordering medications, tests, or procedures and documenting information in the medical record  Follow Up   Return in about 14 weeks (around 10/7/2021).  Patient was given instructions and counseling regarding his condition or for health maintenance advice. Please see specific information pulled into the AVS if appropriate.

## 2021-07-26 ENCOUNTER — HOSPITAL ENCOUNTER (OUTPATIENT)
Dept: GENERAL RADIOLOGY | Facility: HOSPITAL | Age: 61
Discharge: HOME | End: 2021-07-26
Admitting: NURSE PRACTITIONER

## 2021-07-26 ENCOUNTER — OFFICE VISIT (OUTPATIENT)
Dept: PULMONOLOGY | Facility: CLINIC | Age: 61
End: 2021-07-26

## 2021-07-26 VITALS
HEIGHT: 70 IN | SYSTOLIC BLOOD PRESSURE: 118 MMHG | RESPIRATION RATE: 18 BRPM | BODY MASS INDEX: 42.95 KG/M2 | HEART RATE: 89 BPM | DIASTOLIC BLOOD PRESSURE: 74 MMHG | WEIGHT: 300 LBS | OXYGEN SATURATION: 95 %

## 2021-07-26 DIAGNOSIS — J30.89 OTHER ALLERGIC RHINITIS: ICD-10-CM

## 2021-07-26 DIAGNOSIS — R06.02 SHORTNESS OF BREATH: ICD-10-CM

## 2021-07-26 DIAGNOSIS — J45.50 SEVERE PERSISTENT ASTHMA WITHOUT COMPLICATION: ICD-10-CM

## 2021-07-26 DIAGNOSIS — G47.33 OBSTRUCTIVE SLEEP APNEA: ICD-10-CM

## 2021-07-26 DIAGNOSIS — J45.51 SEVERE PERSISTENT ASTHMA WITH ACUTE EXACERBATION: Primary | ICD-10-CM

## 2021-07-26 DIAGNOSIS — E66.01 MORBID OBESITY, UNSPECIFIED OBESITY TYPE (HCC): ICD-10-CM

## 2021-07-26 PROCEDURE — 94726 PLETHYSMOGRAPHY LUNG VOLUMES: CPT | Performed by: INTERNAL MEDICINE

## 2021-07-26 PROCEDURE — 94060 EVALUATION OF WHEEZING: CPT | Performed by: INTERNAL MEDICINE

## 2021-07-26 PROCEDURE — 71046 X-RAY EXAM CHEST 2 VIEWS: CPT

## 2021-07-26 PROCEDURE — 99214 OFFICE O/P EST MOD 30 MIN: CPT | Performed by: NURSE PRACTITIONER

## 2021-07-26 PROCEDURE — 94729 DIFFUSING CAPACITY: CPT | Performed by: INTERNAL MEDICINE

## 2021-07-26 PROCEDURE — 96372 THER/PROPH/DIAG INJ SC/IM: CPT | Performed by: NURSE PRACTITIONER

## 2021-07-26 RX ORDER — TIOTROPIUM BROMIDE INHALATION SPRAY 1.56 UG/1
2 SPRAY, METERED RESPIRATORY (INHALATION) DAILY
Qty: 4 G | Refills: 5 | Status: SHIPPED | OUTPATIENT
Start: 2021-07-26 | End: 2022-01-24 | Stop reason: SDUPTHER

## 2021-07-26 RX ORDER — METHYLPREDNISOLONE ACETATE 80 MG/ML
80 INJECTION, SUSPENSION INTRA-ARTICULAR; INTRALESIONAL; INTRAMUSCULAR; SOFT TISSUE ONCE
Status: COMPLETED | OUTPATIENT
Start: 2021-07-26 | End: 2021-07-26

## 2021-07-26 RX ORDER — ZAFIRLUKAST 20 MG/1
20 TABLET, FILM COATED ORAL 2 TIMES DAILY
Qty: 60 TABLET | Refills: 5 | Status: ON HOLD | OUTPATIENT
Start: 2021-07-26 | End: 2022-01-01

## 2021-07-26 RX ORDER — ALBUTEROL SULFATE 90 UG/1
2 AEROSOL, METERED RESPIRATORY (INHALATION) EVERY 4 HOURS PRN
Qty: 8 G | Refills: 5 | Status: SHIPPED | OUTPATIENT
Start: 2021-07-26 | End: 2022-01-01 | Stop reason: SDUPTHER

## 2021-07-26 RX ORDER — IPRATROPIUM BROMIDE AND ALBUTEROL SULFATE 2.5; .5 MG/3ML; MG/3ML
3 SOLUTION RESPIRATORY (INHALATION) 4 TIMES DAILY PRN
Qty: 360 ML | Refills: 5 | Status: ON HOLD | OUTPATIENT
Start: 2021-07-26 | End: 2022-01-01

## 2021-07-26 RX ORDER — PREDNISONE 20 MG/1
TABLET ORAL
Qty: 10 TABLET | Refills: 0 | Status: ON HOLD | OUTPATIENT
Start: 2021-07-26 | End: 2021-08-09

## 2021-07-26 RX ORDER — BUDESONIDE AND FORMOTEROL FUMARATE DIHYDRATE 80; 4.5 UG/1; UG/1
2 AEROSOL RESPIRATORY (INHALATION)
Qty: 1 EACH | Refills: 3 | Status: SHIPPED | OUTPATIENT
Start: 2021-07-26 | End: 2022-01-24 | Stop reason: SDUPTHER

## 2021-07-26 RX ORDER — AZELASTINE 1 MG/ML
1 SPRAY, METERED NASAL 2 TIMES DAILY PRN
Qty: 1 EACH | Refills: 5 | Status: SHIPPED | OUTPATIENT
Start: 2021-07-26 | End: 2022-01-26

## 2021-07-26 RX ADMIN — METHYLPREDNISOLONE ACETATE 80 MG: 80 INJECTION, SUSPENSION INTRA-ARTICULAR; INTRALESIONAL; INTRAMUSCULAR; SOFT TISSUE at 16:11

## 2021-08-04 ENCOUNTER — OFFICE VISIT (OUTPATIENT)
Dept: PULMONOLOGY | Facility: CLINIC | Age: 61
End: 2021-08-04

## 2021-08-04 VITALS
HEART RATE: 89 BPM | WEIGHT: 303 LBS | DIASTOLIC BLOOD PRESSURE: 80 MMHG | RESPIRATION RATE: 16 BRPM | SYSTOLIC BLOOD PRESSURE: 140 MMHG | OXYGEN SATURATION: 98 % | BODY MASS INDEX: 43.38 KG/M2 | HEIGHT: 70 IN

## 2021-08-04 DIAGNOSIS — E66.01 MORBID OBESITY, UNSPECIFIED OBESITY TYPE (HCC): ICD-10-CM

## 2021-08-04 DIAGNOSIS — G47.33 OSA (OBSTRUCTIVE SLEEP APNEA): ICD-10-CM

## 2021-08-04 DIAGNOSIS — R06.02 SHORTNESS OF BREATH: ICD-10-CM

## 2021-08-04 DIAGNOSIS — J30.89 OTHER ALLERGIC RHINITIS: ICD-10-CM

## 2021-08-04 DIAGNOSIS — J45.51 SEVERE PERSISTENT ASTHMA WITH ACUTE EXACERBATION: Primary | ICD-10-CM

## 2021-08-04 PROCEDURE — 99214 OFFICE O/P EST MOD 30 MIN: CPT | Performed by: INTERNAL MEDICINE

## 2021-08-04 PROCEDURE — 96372 THER/PROPH/DIAG INJ SC/IM: CPT | Performed by: INTERNAL MEDICINE

## 2021-08-04 RX ORDER — SOFT LENS DISINFECTANT
1 SOLUTION, NON-ORAL MISCELLANEOUS TAKE AS DIRECTED
Qty: 1 EACH | Refills: 0 | Status: SHIPPED | OUTPATIENT
Start: 2021-08-04 | End: 2022-01-01

## 2021-08-04 RX ORDER — METHYLPREDNISOLONE ACETATE 80 MG/ML
80 INJECTION, SUSPENSION INTRA-ARTICULAR; INTRALESIONAL; INTRAMUSCULAR; SOFT TISSUE ONCE
Status: COMPLETED | OUTPATIENT
Start: 2021-08-04 | End: 2021-08-04

## 2021-08-04 RX ORDER — PREDNISONE 10 MG/1
TABLET ORAL
Qty: 31 TABLET | Refills: 0 | Status: ON HOLD | OUTPATIENT
Start: 2021-08-04 | End: 2021-08-16 | Stop reason: SDUPTHER

## 2021-08-04 RX ORDER — AMOXICILLIN AND CLAVULANATE POTASSIUM 875; 125 MG/1; MG/1
1 TABLET, FILM COATED ORAL 2 TIMES DAILY
Qty: 10 TABLET | Refills: 0 | Status: ON HOLD | OUTPATIENT
Start: 2021-08-04 | End: 2021-08-09

## 2021-08-04 RX ADMIN — METHYLPREDNISOLONE ACETATE 80 MG: 80 INJECTION, SUSPENSION INTRA-ARTICULAR; INTRALESIONAL; INTRAMUSCULAR; SOFT TISSUE at 14:32

## 2021-08-04 NOTE — PROGRESS NOTES
"  Chief Complaint   Patient presents with   • Follow-up   • Shortness of Breath       Subjective   Denzel Harding is a 61 y.o. male.     History of Present Illness   Patient was evaluated today complaining of shortness of breath, cough and phlegm production which has been worse for the past few days.    he is not complaining of subjective chills.  The patient also denies fever.    The patient says that his sputum is mostly productive of thick brownish to greenish sputum.     Patient is using rescue inhaler/nebulizer more than usual with some relief.    He is back on Dupixent, after a delay of 1 month. He took the dose 8-10 days ago.    He is using his BiPAP almost 16-18 hours a day, due to shortness of breath and chest tightness.    He is aware of the recall but also knows that he has severe sleep apnea and \"can't live without BiPAP\".      The following portions of the patient's history were reviewed and updated as appropriate: allergies, current medications, past family history, past medical history, past social history and past surgical history.    Review of Systems   Constitutional: Negative for chills and fever.   HENT: Negative for rhinorrhea, sinus pressure, sneezing and sore throat.    Respiratory: Positive for cough, shortness of breath and wheezing.    Psychiatric/Behavioral: Positive for sleep disturbance.       Objective   Visit Vitals  /80   Pulse 89   Resp 16   Ht 177.8 cm (70\")   Wt (!) 137 kg (303 lb)   SpO2 98%   BMI 43.48 kg/m²       Physical Exam  Vitals reviewed.   Constitutional:       Appearance: He is well-developed.   Neck:      Thyroid: No thyromegaly.      Vascular: No JVD.   Cardiovascular:      Rate and Rhythm: Normal rate.      Comments: CABG scar noted.   Pulmonary:      Effort: Respiratory distress present.      Breath sounds: Wheezing present.   Musculoskeletal:         General: Normal range of motion.      Cervical back: Normal range of motion.      Comments: Used a cane.  "   Skin:     General: Skin is warm and dry.   Neurological:      Mental Status: He is alert and oriented to person, place, and time.   Psychiatric:         Behavior: Behavior normal.         Assessment/Plan   Diagnoses and all orders for this visit:    1. Severe persistent asthma with acute exacerbation (Primary)  -     methylPREDNISolone acetate (DEPO-medrol) injection 80 mg    2. Shortness of breath    3. Other allergic rhinitis    4. Morbid obesity, unspecified obesity type (CMS/HCC)    5. EDD (obstructive sleep apnea)    Other orders  -     amoxicillin-clavulanate (AUGMENTIN) 875-125 MG per tablet; Take 1 tablet by mouth 2 (Two) Times a Day for 5 days.  Dispense: 10 tablet; Refill: 0  -     predniSONE (DELTASONE) 10 MG tablet; Take 4 tabs daily x 4 days, then take 3 daily x 3 days, then take 2 daily for 2 days, then take 1 daily x 2 days  Dispense: 31 tablet; Refill: 0  -     Nebulizer device; 1 Device Take As Directed.  Dispense: 1 each; Refill: 0           Return in about 10 weeks (around 10/13/2021) for Recheck, For [facility] Keep appt already in system w/ Dr. Barajas.    DISCUSSION (if any):  Last chest x-ray was reviewed personally and the results were shared with the patient.  Images reviewed personally.   Results for orders placed during the hospital encounter of 07/26/21    XR Chest PA & Lateral    Narrative  PROCEDURE: XR CHEST PA AND LATERAL-    HISTORY: shortness of breath; R06.02-Shortness of breath    COMPARISON: 1/26/2021    FINDINGS: No acute pulmonary opacity is present. Mild scarring is seen  left lateral lung base. There is no evidence of effusion or  pneumothorax.    There is evidence of prior median sternotomy, presumably from CABG.  Otherwise, mediastinum is unremarkable.      Heart size is normal.    Impression  Unremarkable chest, status post CABG.      This report was finalized on 7/26/2021 4:03 PM by Phoenix Bella MD.      For the symptoms of acute exacerbation of asthma, he was prescribed  intramuscular steroids.    He is aware of the need to check his FS more regularly after the Depomedrol.    Will also give him longer steroid taper.     Patient will be prescribed Augmentin for 5 days, given purulent sputum and persistent symptoms despite being on steroids.    The patient was asked to start using his rescue medications on a more regular basis for the next few days.    Side effects have been discussed in detail.    Patient was asked to report to the emergency room if symptoms do not improve. If he does come back to ER, he will benefit from being admitted to hospital for IV steroids.    All other medications will remain the same.    Continue BiPAP for now.     He is aware of the recall.     He has severe EDD.        Dictated utilizing Dragon dictation.    This document was electronically signed by Linda Barajas MD on 08/04/21 at 14:21 EDT

## 2021-08-09 ENCOUNTER — HOSPITAL ENCOUNTER (INPATIENT)
Facility: HOSPITAL | Age: 61
LOS: 7 days | Discharge: HOME | End: 2021-08-16
Attending: EMERGENCY MEDICINE | Admitting: INTERNAL MEDICINE

## 2021-08-09 ENCOUNTER — APPOINTMENT (OUTPATIENT)
Dept: GENERAL RADIOLOGY | Facility: HOSPITAL | Age: 61
End: 2021-08-09

## 2021-08-09 DIAGNOSIS — I50.32 CHRONIC DIASTOLIC CHF (CONGESTIVE HEART FAILURE) (HCC): ICD-10-CM

## 2021-08-09 DIAGNOSIS — J45.21 EXACERBATION OF INTERMITTENT ASTHMA, UNSPECIFIED ASTHMA SEVERITY: Primary | ICD-10-CM

## 2021-08-09 DIAGNOSIS — J45.901 ACUTE EXACERBATION OF ASTHMA WITH ALLERGIC RHINITIS: ICD-10-CM

## 2021-08-09 DIAGNOSIS — J44.9 CHRONIC OBSTRUCTIVE PULMONARY DISEASE, UNSPECIFIED COPD TYPE (HCC): ICD-10-CM

## 2021-08-09 PROBLEM — E66.01 MORBID OBESITY WITH BMI OF 40.0-44.9, ADULT: Status: ACTIVE | Noted: 2021-08-09

## 2021-08-09 PROBLEM — E88.81 INSULIN RESISTANCE: Status: ACTIVE | Noted: 2021-08-09

## 2021-08-09 PROBLEM — J44.1 COPD WITH ACUTE EXACERBATION (HCC): Status: ACTIVE | Noted: 2021-08-09

## 2021-08-09 PROBLEM — J96.21 ACUTE ON CHRONIC RESPIRATORY FAILURE WITH HYPOXIA (HCC): Status: ACTIVE | Noted: 2021-08-09

## 2021-08-09 PROBLEM — J45.51 SEVERE PERSISTENT ASTHMA WITH ACUTE EXACERBATION: Status: ACTIVE | Noted: 2021-08-09

## 2021-08-09 PROBLEM — R10.9 ABDOMINAL DISCOMFORT: Status: ACTIVE | Noted: 2021-08-09

## 2021-08-09 PROBLEM — E88.819 INSULIN RESISTANCE: Status: ACTIVE | Noted: 2021-08-09

## 2021-08-09 LAB
A-A DO2: 23.2 MMHG
ALBUMIN SERPL-MCNC: 3.7 G/DL (ref 3.5–5.2)
ALBUMIN/GLOB SERPL: 1.4 G/DL
ALP SERPL-CCNC: 93 U/L (ref 39–117)
ALT SERPL W P-5'-P-CCNC: 90 U/L (ref 1–41)
ANION GAP SERPL CALCULATED.3IONS-SCNC: 10.1 MMOL/L (ref 5–15)
ARTERIAL PATENCY WRIST A: ABNORMAL
AST SERPL-CCNC: 63 U/L (ref 1–40)
ATMOSPHERIC PRESS: 735 MMHG
BASE EXCESS BLDA CALC-SCNC: 2.9 MMOL/L (ref 0–2)
BASOPHILS # BLD AUTO: 0.08 10*3/MM3 (ref 0–0.2)
BASOPHILS NFR BLD AUTO: 0.8 % (ref 0–1.5)
BDY SITE: ABNORMAL
BILIRUB SERPL-MCNC: 0.4 MG/DL (ref 0–1.2)
BUN SERPL-MCNC: 17 MG/DL (ref 8–23)
BUN/CREAT SERPL: 20.5 (ref 7–25)
CALCIUM SPEC-SCNC: 9.2 MG/DL (ref 8.6–10.5)
CHLORIDE SERPL-SCNC: 100 MMOL/L (ref 98–107)
CO2 SERPL-SCNC: 26.9 MMOL/L (ref 22–29)
COHGB MFR BLD: <0.7 % (ref 0–2)
CREAT SERPL-MCNC: 0.83 MG/DL (ref 0.76–1.27)
D-LACTATE SERPL-SCNC: 2 MMOL/L (ref 0.5–2)
DEPRECATED RDW RBC AUTO: 45.6 FL (ref 37–54)
EOSINOPHIL # BLD AUTO: 0.2 10*3/MM3 (ref 0–0.4)
EOSINOPHIL NFR BLD AUTO: 2 % (ref 0.3–6.2)
ERYTHROCYTE [DISTWIDTH] IN BLOOD BY AUTOMATED COUNT: 16.6 % (ref 12.3–15.4)
GFR SERPL CREATININE-BSD FRML MDRD: 95 ML/MIN/1.73
GLOBULIN UR ELPH-MCNC: 2.7 GM/DL
GLUCOSE BLDC GLUCOMTR-MCNC: 272 MG/DL (ref 70–130)
GLUCOSE SERPL-MCNC: 223 MG/DL (ref 65–99)
HCO3 BLDA-SCNC: 27.6 MMOL/L (ref 22–28)
HCT VFR BLD AUTO: 40.4 % (ref 37.5–51)
HCT VFR BLD CALC: 41.8 %
HGB BLD-MCNC: 13.2 G/DL (ref 13–17.7)
IMM GRANULOCYTES # BLD AUTO: 0.18 10*3/MM3 (ref 0–0.05)
IMM GRANULOCYTES NFR BLD AUTO: 1.8 % (ref 0–0.5)
INHALED O2 CONCENTRATION: 21 %
LYMPHOCYTES # BLD AUTO: 2.46 10*3/MM3 (ref 0.7–3.1)
LYMPHOCYTES NFR BLD AUTO: 24 % (ref 19.6–45.3)
Lab: ABNORMAL
MCH RBC QN AUTO: 24.9 PG (ref 26.6–33)
MCHC RBC AUTO-ENTMCNC: 32.7 G/DL (ref 31.5–35.7)
MCV RBC AUTO: 76.2 FL (ref 79–97)
METHGB BLD QL: 0.3 % (ref 0–1.5)
MODALITY: ABNORMAL
MONOCYTES # BLD AUTO: 0.68 10*3/MM3 (ref 0.1–0.9)
MONOCYTES NFR BLD AUTO: 6.6 % (ref 5–12)
NEUTROPHILS NFR BLD AUTO: 6.63 10*3/MM3 (ref 1.7–7)
NEUTROPHILS NFR BLD AUTO: 64.8 % (ref 42.7–76)
NOTE: ABNORMAL
NRBC BLD AUTO-RTO: 0 /100 WBC (ref 0–0.2)
NT-PROBNP SERPL-MCNC: 103.8 PG/ML (ref 0–900)
OXYHGB MFR BLDV: 93.1 % (ref 94–99)
PCO2 BLDA: 42 MM HG (ref 35–45)
PCO2 TEMP ADJ BLD: ABNORMAL MM[HG]
PH BLDA: 7.43 PH UNITS (ref 7.35–7.45)
PH, TEMP CORRECTED: ABNORMAL
PLATELET # BLD AUTO: 191 10*3/MM3 (ref 140–450)
PMV BLD AUTO: 9.9 FL (ref 6–12)
PO2 BLDA: 73.8 MM HG (ref 75–100)
PO2 TEMP ADJ BLD: ABNORMAL MM[HG]
POTASSIUM SERPL-SCNC: 4.3 MMOL/L (ref 3.5–5.2)
PROCALCITONIN SERPL-MCNC: 0.1 NG/ML (ref 0–0.25)
PROT SERPL-MCNC: 6.4 G/DL (ref 6–8.5)
RBC # BLD AUTO: 5.3 10*6/MM3 (ref 4.14–5.8)
SAO2 % BLDCOA: 93.9 % (ref 94–100)
SARS-COV-2 RNA PNL SPEC NAA+PROBE: NOT DETECTED
SODIUM SERPL-SCNC: 137 MMOL/L (ref 136–145)
TROPONIN T SERPL-MCNC: <0.01 NG/ML (ref 0–0.03)
VENTILATOR MODE: ABNORMAL
WBC # BLD AUTO: 10.23 10*3/MM3 (ref 3.4–10.8)

## 2021-08-09 PROCEDURE — 87040 BLOOD CULTURE FOR BACTERIA: CPT | Performed by: FAMILY MEDICINE

## 2021-08-09 PROCEDURE — 82375 ASSAY CARBOXYHB QUANT: CPT

## 2021-08-09 PROCEDURE — 94640 AIRWAY INHALATION TREATMENT: CPT

## 2021-08-09 PROCEDURE — 87635 SARS-COV-2 COVID-19 AMP PRB: CPT | Performed by: PHYSICIAN ASSISTANT

## 2021-08-09 PROCEDURE — 93005 ELECTROCARDIOGRAM TRACING: CPT | Performed by: FAMILY MEDICINE

## 2021-08-09 PROCEDURE — 25010000002 METHYLPREDNISOLONE PER 125 MG: Performed by: PHYSICIAN ASSISTANT

## 2021-08-09 PROCEDURE — 84484 ASSAY OF TROPONIN QUANT: CPT | Performed by: FAMILY MEDICINE

## 2021-08-09 PROCEDURE — 82805 BLOOD GASES W/O2 SATURATION: CPT

## 2021-08-09 PROCEDURE — 93005 ELECTROCARDIOGRAM TRACING: CPT | Performed by: PHYSICIAN ASSISTANT

## 2021-08-09 PROCEDURE — 84145 PROCALCITONIN (PCT): CPT | Performed by: FAMILY MEDICINE

## 2021-08-09 PROCEDURE — 94799 UNLISTED PULMONARY SVC/PX: CPT

## 2021-08-09 PROCEDURE — 83880 ASSAY OF NATRIURETIC PEPTIDE: CPT | Performed by: PHYSICIAN ASSISTANT

## 2021-08-09 PROCEDURE — 83050 HGB METHEMOGLOBIN QUAN: CPT

## 2021-08-09 PROCEDURE — 36600 WITHDRAWAL OF ARTERIAL BLOOD: CPT

## 2021-08-09 PROCEDURE — 99284 EMERGENCY DEPT VISIT MOD MDM: CPT

## 2021-08-09 PROCEDURE — 85025 COMPLETE CBC W/AUTO DIFF WBC: CPT | Performed by: PHYSICIAN ASSISTANT

## 2021-08-09 PROCEDURE — 83605 ASSAY OF LACTIC ACID: CPT | Performed by: FAMILY MEDICINE

## 2021-08-09 PROCEDURE — 84484 ASSAY OF TROPONIN QUANT: CPT | Performed by: PHYSICIAN ASSISTANT

## 2021-08-09 PROCEDURE — 71045 X-RAY EXAM CHEST 1 VIEW: CPT

## 2021-08-09 PROCEDURE — 82962 GLUCOSE BLOOD TEST: CPT

## 2021-08-09 PROCEDURE — 99222 1ST HOSP IP/OBS MODERATE 55: CPT | Performed by: FAMILY MEDICINE

## 2021-08-09 PROCEDURE — 80053 COMPREHEN METABOLIC PANEL: CPT | Performed by: PHYSICIAN ASSISTANT

## 2021-08-09 RX ORDER — CETIRIZINE HYDROCHLORIDE 10 MG/1
10 TABLET ORAL NIGHTLY
Status: DISCONTINUED | OUTPATIENT
Start: 2021-08-09 | End: 2021-08-16 | Stop reason: HOSPADM

## 2021-08-09 RX ORDER — METOPROLOL SUCCINATE 100 MG/1
200 TABLET, EXTENDED RELEASE ORAL EVERY 24 HOURS
Status: DISCONTINUED | OUTPATIENT
Start: 2021-08-09 | End: 2021-08-16 | Stop reason: HOSPADM

## 2021-08-09 RX ORDER — DOCUSATE SODIUM 100 MG/1
100 CAPSULE, LIQUID FILLED ORAL 2 TIMES DAILY PRN
Status: DISCONTINUED | OUTPATIENT
Start: 2021-08-09 | End: 2021-08-16 | Stop reason: HOSPADM

## 2021-08-09 RX ORDER — SODIUM CHLORIDE 0.9 % (FLUSH) 0.9 %
10 SYRINGE (ML) INJECTION EVERY 12 HOURS SCHEDULED
Status: DISCONTINUED | OUTPATIENT
Start: 2021-08-10 | End: 2021-08-16 | Stop reason: HOSPADM

## 2021-08-09 RX ORDER — DEXTROSE MONOHYDRATE 25 G/50ML
25 INJECTION, SOLUTION INTRAVENOUS
Status: DISCONTINUED | OUTPATIENT
Start: 2021-08-09 | End: 2021-08-16 | Stop reason: HOSPADM

## 2021-08-09 RX ORDER — LAMOTRIGINE 100 MG/1
100 TABLET ORAL 2 TIMES DAILY
Status: DISCONTINUED | OUTPATIENT
Start: 2021-08-09 | End: 2021-08-16 | Stop reason: HOSPADM

## 2021-08-09 RX ORDER — ASPIRIN 325 MG
325 TABLET, DELAYED RELEASE (ENTERIC COATED) ORAL DAILY
Status: DISCONTINUED | OUTPATIENT
Start: 2021-08-10 | End: 2021-08-16 | Stop reason: HOSPADM

## 2021-08-09 RX ORDER — LISINOPRIL 5 MG/1
5 TABLET ORAL DAILY
Status: DISCONTINUED | OUTPATIENT
Start: 2021-08-10 | End: 2021-08-16 | Stop reason: HOSPADM

## 2021-08-09 RX ORDER — ZAFIRLUKAST 20 MG/1
20 TABLET, FILM COATED ORAL 2 TIMES DAILY
Status: DISCONTINUED | OUTPATIENT
Start: 2021-08-09 | End: 2021-08-16 | Stop reason: HOSPADM

## 2021-08-09 RX ORDER — IPRATROPIUM BROMIDE AND ALBUTEROL SULFATE 2.5; .5 MG/3ML; MG/3ML
3 SOLUTION RESPIRATORY (INHALATION) ONCE
Status: COMPLETED | OUTPATIENT
Start: 2021-08-09 | End: 2021-08-09

## 2021-08-09 RX ORDER — VITAMIN E 268 MG
400 CAPSULE ORAL DAILY
Status: DISCONTINUED | OUTPATIENT
Start: 2021-08-10 | End: 2021-08-16 | Stop reason: HOSPADM

## 2021-08-09 RX ORDER — LEVETIRACETAM 500 MG/1
1000 TABLET ORAL DAILY
Status: DISCONTINUED | OUTPATIENT
Start: 2021-08-10 | End: 2021-08-16 | Stop reason: HOSPADM

## 2021-08-09 RX ORDER — METHYLPREDNISOLONE SODIUM SUCCINATE 125 MG/2ML
125 INJECTION, POWDER, LYOPHILIZED, FOR SOLUTION INTRAMUSCULAR; INTRAVENOUS ONCE
Status: COMPLETED | OUTPATIENT
Start: 2021-08-09 | End: 2021-08-09

## 2021-08-09 RX ORDER — ASCORBIC ACID 500 MG
500 TABLET ORAL DAILY
Status: DISCONTINUED | OUTPATIENT
Start: 2021-08-10 | End: 2021-08-16 | Stop reason: HOSPADM

## 2021-08-09 RX ORDER — IPRATROPIUM BROMIDE AND ALBUTEROL SULFATE 2.5; .5 MG/3ML; MG/3ML
3 SOLUTION RESPIRATORY (INHALATION) 4 TIMES DAILY PRN
Status: DISCONTINUED | OUTPATIENT
Start: 2021-08-09 | End: 2021-08-10

## 2021-08-09 RX ORDER — METHYLPREDNISOLONE SODIUM SUCCINATE 125 MG/2ML
60 INJECTION, POWDER, LYOPHILIZED, FOR SOLUTION INTRAMUSCULAR; INTRAVENOUS EVERY 8 HOURS
Status: DISCONTINUED | OUTPATIENT
Start: 2021-08-10 | End: 2021-08-11

## 2021-08-09 RX ORDER — INSULIN HUMAN 500 [IU]/ML
190 INJECTION, SOLUTION SUBCUTANEOUS EVERY MORNING
COMMUNITY
End: 2022-01-01

## 2021-08-09 RX ORDER — GABAPENTIN 300 MG/1
600 CAPSULE ORAL EVERY 8 HOURS SCHEDULED
Status: DISCONTINUED | OUTPATIENT
Start: 2021-08-09 | End: 2021-08-16 | Stop reason: HOSPADM

## 2021-08-09 RX ORDER — METHYLPREDNISOLONE SODIUM SUCCINATE 125 MG/2ML
80 INJECTION, POWDER, LYOPHILIZED, FOR SOLUTION INTRAMUSCULAR; INTRAVENOUS EVERY 8 HOURS
Status: COMPLETED | OUTPATIENT
Start: 2021-08-10 | End: 2021-08-10

## 2021-08-09 RX ORDER — CETIRIZINE HYDROCHLORIDE 10 MG/1
10 TABLET ORAL NIGHTLY
Status: DISCONTINUED | OUTPATIENT
Start: 2021-08-09 | End: 2021-08-09

## 2021-08-09 RX ORDER — MECLIZINE HYDROCHLORIDE 25 MG/1
25 TABLET ORAL 3 TIMES DAILY PRN
Status: DISCONTINUED | OUTPATIENT
Start: 2021-08-09 | End: 2021-08-16 | Stop reason: HOSPADM

## 2021-08-09 RX ORDER — CLOPIDOGREL BISULFATE 75 MG/1
75 TABLET ORAL DAILY
Status: DISCONTINUED | OUTPATIENT
Start: 2021-08-10 | End: 2021-08-16 | Stop reason: HOSPADM

## 2021-08-09 RX ORDER — INSULIN HUMAN 500 [IU]/ML
180 INJECTION, SOLUTION SUBCUTANEOUS 2 TIMES DAILY
COMMUNITY
End: 2022-01-26

## 2021-08-09 RX ORDER — PANTOPRAZOLE SODIUM 40 MG/1
40 TABLET, DELAYED RELEASE ORAL NIGHTLY
Status: DISCONTINUED | OUTPATIENT
Start: 2021-08-10 | End: 2021-08-16 | Stop reason: HOSPADM

## 2021-08-09 RX ORDER — SODIUM CHLORIDE 0.9 % (FLUSH) 0.9 %
10 SYRINGE (ML) INJECTION AS NEEDED
Status: DISCONTINUED | OUTPATIENT
Start: 2021-08-09 | End: 2021-08-16 | Stop reason: HOSPADM

## 2021-08-09 RX ORDER — PANTOPRAZOLE SODIUM 40 MG/1
40 TABLET, DELAYED RELEASE ORAL EVERY MORNING
Refills: 0 | Status: DISCONTINUED | OUTPATIENT
Start: 2021-08-10 | End: 2021-08-09

## 2021-08-09 RX ORDER — SPIRONOLACTONE 25 MG/1
25 TABLET ORAL DAILY
Status: DISCONTINUED | OUTPATIENT
Start: 2021-08-10 | End: 2021-08-16 | Stop reason: HOSPADM

## 2021-08-09 RX ORDER — ATORVASTATIN CALCIUM 80 MG/1
80 TABLET, FILM COATED ORAL NIGHTLY
Status: DISCONTINUED | OUTPATIENT
Start: 2021-08-09 | End: 2021-08-16 | Stop reason: HOSPADM

## 2021-08-09 RX ORDER — NICOTINE POLACRILEX 4 MG
1 LOZENGE BUCCAL
Status: DISCONTINUED | OUTPATIENT
Start: 2021-08-09 | End: 2021-08-16 | Stop reason: HOSPADM

## 2021-08-09 RX ORDER — BUDESONIDE AND FORMOTEROL FUMARATE DIHYDRATE 160; 4.5 UG/1; UG/1
2 AEROSOL RESPIRATORY (INHALATION)
Status: DISCONTINUED | OUTPATIENT
Start: 2021-08-09 | End: 2021-08-16 | Stop reason: HOSPADM

## 2021-08-09 RX ADMIN — CETIRIZINE HYDROCHLORIDE 10 MG: 10 TABLET, FILM COATED ORAL at 23:29

## 2021-08-09 RX ADMIN — GABAPENTIN 600 MG: 300 CAPSULE ORAL at 23:29

## 2021-08-09 RX ADMIN — METHYLPREDNISOLONE SODIUM SUCCINATE 80 MG: 125 INJECTION, POWDER, FOR SOLUTION INTRAMUSCULAR; INTRAVENOUS at 23:56

## 2021-08-09 RX ADMIN — SODIUM CHLORIDE, PRESERVATIVE FREE 10 ML: 5 INJECTION INTRAVENOUS at 23:56

## 2021-08-09 RX ADMIN — ENOXAPARIN SODIUM 40 MG: 40 INJECTION SUBCUTANEOUS at 23:56

## 2021-08-09 RX ADMIN — PANTOPRAZOLE SODIUM 40 MG: 40 TABLET, DELAYED RELEASE ORAL at 23:56

## 2021-08-09 RX ADMIN — IPRATROPIUM BROMIDE AND ALBUTEROL SULFATE 3 ML: .5; 3 SOLUTION RESPIRATORY (INHALATION) at 20:43

## 2021-08-09 RX ADMIN — DOXYCYCLINE 100 MG: 100 INJECTION, POWDER, LYOPHILIZED, FOR SOLUTION INTRAVENOUS at 23:55

## 2021-08-09 RX ADMIN — ATORVASTATIN CALCIUM 80 MG: 80 TABLET, FILM COATED ORAL at 23:29

## 2021-08-09 RX ADMIN — METHYLPREDNISOLONE SODIUM SUCCINATE 125 MG: 125 INJECTION, POWDER, FOR SOLUTION INTRAMUSCULAR; INTRAVENOUS at 16:30

## 2021-08-09 RX ADMIN — LAMOTRIGINE 100 MG: 100 TABLET ORAL at 23:29

## 2021-08-09 NOTE — H&P
[facility]  HISTORY AND PHYSICAL    Patient Name: Denzel Harding  : 1960  MRN: [id number]  Primary Care Physician: Isaura Godoy MD  Date of admission: 2021      Subjective   Subjective     Chief Complaint:  Shortness of breath     HPI:  Denzel Harding is a 61 y.o. male with PMHx of asthma, COPD, HEpEF, CAD, lumbar radiculopathy, T2DM, GERD, hx of CVA, hx of pericarditis, HTN, MUSE, severe EDD on BiPAP, HLD, focal Seizures, who presented to [facility] ED with worsening shortness and breath for the past 2 weeks.  He was seen by his pulmonologist Dr. Barajas on 2021.  Due to worsening shortness of breath and productive cough at that time patient was placed on a long steroid taper and prescribed Augmentin 875 for 5 days.  He was also given IM steroids at that visit.  Patient was advised if his symptoms did not worsen that he would need to come to the ED for further evaluation and admission for IV steroids.  Patient symptoms have not improved since that time despite completing Augmentin and steroid taper.  Therefore he presented to [facility] ED for further evaluation.  Upon arrival to the ED patient complained of coughing and wheezing and shortness of breath.  He reports being compliant with all his medications.  He does complain of a productive cough with greenish to brown expectorant.  Complains of a burning sensation in his chest when he has takes a deep breath.  He reports dyspnea with exertion and some mild pedal edema. Therefor the decision was made to admit patient to hospitalist service for further evaluation and management.      COVID Details:    Symptoms:    [] NONE [] Fever [x]  Cough [x] Shortness of breath [] Change in taste/smell      The patient has a COVID HM Topic on their chart, and they are fully vaccinated.      Review of Systems   Constitutional: Positive for fatigue. Negative for chills and fever.   HENT: Negative for congestion, ear pain, rhinorrhea  and sore throat.    Respiratory: Positive for cough (productive, green and brown expectorant ), shortness of breath and wheezing.    Cardiovascular: Positive for leg swelling. Negative for chest pain and palpitations.   Gastrointestinal: Negative for abdominal pain, blood in stool, constipation, diarrhea, nausea and vomiting.   Genitourinary: Negative for dysuria and hematuria.   Musculoskeletal: Positive for back pain (mid thoracic region and radiates down his back ) and gait problem (since cva, balance difficulty).   Skin: Negative.    Neurological: Positive for headaches (since CVA in March ). Negative for dizziness and light-headedness.   Psychiatric/Behavioral: Positive for behavioral problems and dysphoric mood. Negative for sleep disturbance. The patient is not nervous/anxious.         All other systems reviewed and are negative.     Personal History     Past Medical History:   Diagnosis Date   • Anxiety    • Arthritis    • Asthma    • Brachial neuritis 1/19/2017   • Cellulitis     left arm and right leg    • Chest pain    • CHF (congestive heart failure) (CMS/[facility])     Patient reported history May 2020    • COPD (chronic obstructive pulmonary disease) (CMS/[facility])    • Coronary artery disease     Patient reported CABG , 3 vessel   • Depression    • Diabetes mellitus (CMS/[facility])     diagnosed in 1998, checks fsbg 3-4x/day   • Dizziness    • Edema    • Elevated cholesterol    • GERD (gastroesophageal reflux disease)    • H/O chest x-ray 07/04/2015    Mild Left base atelectasis   • H/O echocardiogram 07/05/2015    Normal LVSF. EF of 60-65%. Grade 1 diastolic dysfunction of the LV myocardium. No evidence of pericardial effusion   • H/O exercise stress test    • Hearing loss     No use of hearing aids   • History of fracture     Reported 2 bones in left foot and a finger   • History of herniated intervertebral disc     History of left L5-S1 disc herniation   • History of pneumonia    • History of pneumonia    •  Hyperlipidemia    • Hypertension    • Impaired functional mobility, balance, gait, and endurance    • LOM (loss of memory) 1/19/2017   • Lower back pain     Right   • Measles    • MUSE (nonalcoholic steatohepatitis)    • Neuropathy     feet    • EDD treated with BiPAP     BiPAP HS - instructed to bring mask/machine DOS (setting 13 and 5)   • Palpitations    • Pericardial effusion    • Poor dentition     Patient reported missing multiple teeth   • Renal disorder    • Seizure disorder (CMS/HCC)     focal seizures   • SOB (shortness of breath)    • Staph infection     back after sugery at the incision site    • Stroke (CMS/HCC)     x2 most recent , slight weakness in hands occasionaly    • Wears glasses        Past Surgical History:   Procedure Laterality Date   • BACK SURGERY     • CARDIAC CATHETERIZATION     • CARDIAC CATHETERIZATION N/A 8/11/2017    Procedure: Coronary angiography;  Surgeon: Dallas Kim MD;  Location: [facility] CATH INVASIVE LOCATION;  Service:    • CARDIAC CATHETERIZATION N/A 8/11/2017    Procedure: Left Heart Cath;  Surgeon: Dallas Kim MD;  Location: [facility] CATH INVASIVE LOCATION;  Service:    • CARDIAC CATHETERIZATION N/A 8/11/2017    Procedure: Left ventriculography;  Surgeon: Dallas Kim MD;  Location: [facility] CATH INVASIVE LOCATION;  Service:    • CARDIAC CATHETERIZATION      2002 x1 stent,  x1 stent   • CARDIOVASCULAR STRESS TEST  07/03/2017    WITH DR KIM AT [facility]   • CARPAL TUNNEL RELEASE Right    • CATARACT EXTRACTION W/ INTRAOCULAR LENS IMPLANT Right 6/12/2017    Procedure: CATARACT PHACO EXTRACTION WITH INTRAOCULAR LENS IMPLANT RIGHT ;  Surgeon: Natalie Cao MD;  Location: [facility] OR;  Service:    • CATARACT EXTRACTION W/ INTRAOCULAR LENS IMPLANT Left 7/10/2017    Procedure: CATARACT PHACO EXTRACTION WITH INTRAOCULAR LENS IMPLANT LEFT;  Surgeon: Natalie Coa MD;  Location: [facility] OR;  Service:    • CHOLECYSTECTOMY     • COLONOSCOPY     • COLONOSCOPY N/A  7/2/2020    Procedure: COLONOSCOPY;  Surgeon: Petra Crowell MD;  Location: [facility] ENDOSCOPY;  Service: Gastroenterology;  Laterality: N/A;  poor prep   • CORONARY ANGIOPLASTY WITH STENT PLACEMENT      X1-LAD   • CORONARY ARTERY BYPASS GRAFT N/A 8/15/2017    Procedure: CORONARY ARTERY BYPASS GRAFTING x 3 UTILIZING THE LEFT INTERNAL MAMMARY ARTERY WITH ENDOSCOPIC VEIN HARVESTING OF THE RIGHT GREATER SAPHENOUS VEIN, HAMLET, LAD ENDARECTOMY;  Surgeon: London Damon MD;  Location:  [facility] OR;  Service:    • ENDOSCOPY     • EYE CAPSULOTOMY WITH LASER Right 11/25/2019    Procedure: EYE CAPSULOTOMY WITH LASER RIGHT;  Surgeon: Natalie Cao MD;  Location: [facility] OR;  Service: Ophthalmology   • EYE CAPSULOTOMY WITH LASER Left 12/9/2019    Procedure: EYE CAPSULOTOMY WITH LASER LEFT;  Surgeon: Natalie Cao MD;  Location:  [facility] OR;  Service: Ophthalmology   • EYE SURGERY      cataract surgery both eyes   • INTERVENTIONAL RADIOLOGY PROCEDURE Bilateral 12/31/2019    Procedure: Carotid Cerebral Angiogram;  Surgeon: Damian Dubois MD;  Location: [facility] CATH INVASIVE LOCATION;  Service: Interventional Radiology   • KNEE ARTHROSCOPY Bilateral    • KNEE ARTHROSCOPY Right 2010    Dr Lewis   • KNEE ARTHROSCOPY Left 2008    Dr Jameson   • KNEE MENISCECTOMY Right 10/15/2020    Procedure: Right knee arthroscopy with partial medial and lateral meniscectomy and three compartment chondroplasty.;  Surgeon: South Lewis MD;  Location:  [facility] OR;  Service: Orthopedics;  Laterality: Right;   • MOUTH SURGERY      Oral extractions   • NEUROPLASTY / TRANSPOSITION ULNAR NERVE AT ELBOW Left    • OTHER SURGICAL HISTORY      Foraminotomy and discectomy   • PERICARDIAL WINDOW N/A 8/25/2017    Procedure: PERICARDIAL WINDOW;  Surgeon: Freeman Phillips MD;  Location:  [facility] OR;  Service:        Family History:    family history includes Alcohol abuse in his father; Arthritis in his mother; Heart disease in some other family members;  Hypertension in his mother and other family members; Other in an other family member; Parkinsonism in an other family member; Stroke in some other family members. Otherwise pertinent FHx was reviewed and unremarkable.     Social History:  reports that he quit smoking about 23 years ago. His smoking use included cigarettes. He has a 10.00 pack-year smoking history. He quit smokeless tobacco use about 4 years ago.  His smokeless tobacco use included snuff. He reports current alcohol use. He reports that he does not use drugs.  Social History     Social History Narrative    Caffeine use: 0-1 serving daily.    Patient lives at home with wife.        Medications:  Available home medication information reviewed.  No current facility-administered medications on file prior to encounter.     Current Outpatient Medications on File Prior to Encounter   Medication Sig Dispense Refill   • albuterol sulfate HFA (Ventolin HFA) 108 (90 Base) MCG/ACT inhaler Inhale 2 puffs Every 4 (Four) Hours As Needed for Wheezing or Shortness of Air. 8 g 5   • amitriptyline (ELAVIL) 25 MG tablet TAKE 3 TABLETS BY MOUTH EVERY NIGHT AT BEDTIME 270 tablet 0   • amoxicillin-clavulanate (AUGMENTIN) 875-125 MG per tablet Take 1 tablet by mouth 2 (Two) Times a Day for 5 days. 10 tablet 0   • aspirin  MG tablet Take 1 tablet by mouth Daily. 30 tablet 0   • atorvastatin (LIPITOR) 80 MG tablet Take 1 tablet by mouth Every Night. 90 tablet 1   • azelastine (ASTELIN) 0.1 % nasal spray 1 spray into the nostril(s) as directed by provider 2 (Two) Times a Day As Needed for Rhinitis or Allergies. Use in each nostril as directed 1 each 5   • B-D ULTRAFINE III SHORT PEN 31G X 8 MM misc 2 (Two) Times a Day.     • budesonide-formoterol (Symbicort) 80-4.5 MCG/ACT inhaler Inhale 2 puffs 2 (Two) Times a Day. Rinse mouth with water after use. 1 each 3   • ciprofloxacin (CILOXAN) 0.3 % ophthalmic solution INSTILL 5 DROPS INTO LEFT EAR TWICE DAILY FOR 7 DAYS     •  clopidogrel (PLAVIX) 75 MG tablet Take 1 tablet by mouth Daily. 90 tablet 1   • coenzyme Q10 100 MG capsule Take 100 mg by mouth Daily.     • Cycloset 0.8 MG tablet Take 3.2 mg by mouth Every Morning. 120 tablet 5   • dicyclomine (BENTYL) 10 MG/5ML syrup Take 10 mL by mouth 3 (Three) Times a Day As Needed (abdominal pain.). 473 mL 1   • docusate sodium (Colace) 100 MG capsule Take 1 capsule by mouth 2 (Two) Times a Day As Needed for Constipation. 60 capsule 3   • Dupixent 300 MG/2ML solution prefilled syringe INJECT 300 MG SUBCUTANEOUSLY EVERY OTHER WEEK 4 each 4   • gabapentin (NEURONTIN) 600 MG tablet Take 1 tablet by mouth 3 (Three) Times a Day. 270 tablet 3   • HumuLIN R U-500 KwikPen 500 UNIT/ML solution pen-injector CONCENTRATED injection Inject 235 Units under the skin into the appropriate area as directed 2 (two) times a day.     • ibuprofen (ADVIL,MOTRIN) 800 MG tablet Take 800 mg by mouth.     • ipratropium-albuterol (DUO-NEB) 0.5-2.5 mg/3 ml nebulizer Take 3 mL by nebulization 4 (Four) Times a Day As Needed for Wheezing or Shortness of Air. 360 mL 5   • lamoTRIgine (LaMICtal) 100 MG tablet Take 100 mg by mouth 2 (Two) Times a Day.     • levETIRAcetam (KEPPRA) 1000 MG tablet Take 1,000 mg by mouth Daily.     • levocetirizine (XYZAL) 5 MG tablet Take 1 tablet by mouth Every Evening. 90 tablet 3   • lisinopril (PRINIVIL,ZESTRIL) 5 MG tablet Take 1 tablet by mouth Daily. 90 tablet 1   • meclizine (ANTIVERT) 25 MG tablet Take 1 tablet by mouth 3 (Three) Times a Day As Needed for dizziness. 10 tablet 0   • metoprolol succinate XL (TOPROL-XL) 200 MG 24 hr tablet Take 1 tablet by mouth Daily 90 tablet 2   • Nebulizer device 1 Device Take As Directed. 1 each 0   • omeprazole (priLOSEC) 20 MG capsule Take 1 capsule by mouth Daily. 90 capsule 0   • predniSONE (DELTASONE) 10 MG tablet Take 4 tabs daily x 4 days, then take 3 daily x 3 days, then take 2 daily for 2 days, then take 1 daily x 2 days 31 tablet 0   •  predniSONE (DELTASONE) 20 MG tablet Take 2 tablets daily for 5 days. 10 tablet 0   • promethazine (PHENERGAN) 25 MG tablet Take 1 tablet by mouth Every 6 (Six) Hours As Needed for Nausea or Vomiting. 20 tablet 0   • Semaglutide, 1 MG/DOSE, (Ozempic, 1 MG/DOSE,) 2 MG/1.5ML solution pen-injector Inject 1 mg under the skin into the appropriate area as directed 1 (One) Time Per Week. 9 mL 3   • spironolactone (ALDACTONE) 25 MG tablet Take 1 tablet by mouth Daily. 90 tablet 1   • Tiotropium Bromide Monohydrate (Spiriva Respimat) 1.25 MCG/ACT aerosol solution inhaler Inhale 2 puffs Daily. 4 g 5   • TURMERIC PO Take 500 mg by mouth 2 (Two) Times a Day.     • vitamin C (ASCORBIC ACID) 500 MG tablet Take 500 mg by mouth Daily.     • vitamin E 400 UNIT capsule Take 1 capsule by mouth 2 (two) times a day. 60 capsule 5   • Vortioxetine HBr (Trintellix) 20 MG tablet Take 20 mg by mouth Daily. 90 tablet 0   • zafirlukast (Accolate) 20 MG tablet Take 1 tablet by mouth 2 (Two) Times a Day. 60 tablet 5         Allergies   Allergen Reactions   • Ajovy [Fremanezumab-Vfrm] Other (See Comments)     Caused tightness of chest, vomiting, pain in both arms.   • Sulfa Antibiotics Anaphylaxis, Nausea And Vomiting and Delirium   • Invokana [Canagliflozin] Unknown - Low Severity     DKA   • Codeine Nausea And Vomiting     Can only take with Phenergan   • Morphine Unknown - Low Severity     Does not work. It causes pain       Objective   Objective     Vital Signs:   Temp:  [98.7 °F (37.1 °C)-99.1 °F (37.3 °C)] 99 °F (37.2 °C)  Heart Rate:  [77-91] 84  Resp:  [18-20] 20  BP: (137-143)/(69-83) 143/74       Physical Exam  Vitals and nursing note reviewed.   Constitutional:       General: He is in acute distress (some conversational dyspnea ).      Appearance: He is obese. He is not diaphoretic.      Comments: Pleasant    HENT:      Head: Atraumatic.      Right Ear: External ear normal.      Left Ear: External ear normal.   Eyes:      General:          Right eye: No discharge.         Left eye: No discharge.      Conjunctiva/sclera: Conjunctivae normal.   Cardiovascular:      Rate and Rhythm: Normal rate and regular rhythm.      Heart sounds: No murmur heard.     Pulmonary:      Effort: Pulmonary effort is normal.      Breath sounds: Decreased breath sounds (Decreased breath sounds in the apex bilaterally) and wheezing (in bases bilateral ) present. No rales.   Abdominal:      General: Bowel sounds are normal. There is no distension.      Palpations: Abdomen is soft.      Tenderness: There is abdominal tenderness (mild tenderness in midabd to the right of midline with possible hernia vs lipoma palpated ).   Musculoskeletal:      Right lower leg: Edema present.      Left lower leg: Edema present.      Comments: Trace edema    Skin:     Findings: No rash.   Neurological:      Mental Status: He is alert and oriented to person, place, and time.   Psychiatric:         Mood and Affect: Mood normal.         Thought Content: Thought content normal.            Result Review:  I have personally reviewed the results from the time of this admission to 8/9/2021 21:18 EDT and agree with these findings:  [x]  Laboratory  [x]  Microbiology  [x]  Radiology  [x]  EKG/Telemetry   []  Cardiology/Vascular   []  Pathology  []  Old records  []  Other:  Most notable findings include: COVID-19 -negative     LAB RESULTS:      Lab 08/09/21  1815 08/09/21  1629   WBC  --  10.23   HEMOGLOBIN  --  13.2   HEMATOCRIT  --  40.4   PLATELETS  --  191   NEUTROS ABS  --  6.63   IMMATURE GRANS (ABS)  --  0.18*   LYMPHS ABS  --  2.46   MONOS ABS  --  0.68   EOS ABS  --  0.20   MCV  --  76.2*   PROCALCITONIN 0.10  --    LACTATE 2.0  --          Lab 08/09/21  1629   SODIUM 137   POTASSIUM 4.3   CHLORIDE 100   CO2 26.9   ANION GAP 10.1   BUN 17   CREATININE 0.83   GLUCOSE 223*   CALCIUM 9.2         Lab 08/09/21  1629   TOTAL PROTEIN 6.4   ALBUMIN 3.70   GLOBULIN 2.7   ALT (SGPT) 90*   AST (SGOT) 63*    BILIRUBIN 0.4   ALK PHOS 93         Lab 08/09/21  1629   PROBNP 103.8   TROPONIN T <0.010                 Lab 08/09/21  2031   PH, ARTERIAL 7.427   PCO2, ARTERIAL 42.0   PO2 ART 73.8*   O2 SATURATION ART 93.9*   FIO2 21   HCO3 ART 27.6   BASE EXCESS ART 2.9*   CARBOXYHEMOGLOBIN <0.7     UA    Urinalysis 8/17/20 8/17/20 10/13/20    0420 0420    Squamous Epithelial Cells, UA  0-2    Specific Gravity, UA 1.008  1.028   Ketones, UA Negative  Negative   Blood, UA Negative  Negative   Leukocytes, UA Trace (A)  Negative   Nitrite, UA Negative  Negative   RBC, UA  None Seen    WBC, UA  3-5 (A)    Bacteria, UA  Trace (A)    (A) Abnormal value              Microbiology Results (last 10 days)     Procedure Component Value - Date/Time    COVID-19,[facility] IN-HOUSE,Nasal Swab No Transport Media 3-4 HR TAT - Swab, Nasal Cavity [[id number]]  (Normal) Collected: 08/09/21 1629    Lab Status: Final result Specimen: Swab from Nasal Cavity Updated: 08/09/21 1706     COVID19 Not Detected    Narrative:      Fact sheet for providers: https://www.fda.gov/media/974314/download     Fact sheet for patients: https://www.fda.gov/media/223730/download    Test performed by PCR.    Consider negative results in combination with clinical observations, patient history, and epidemiological information.          No radiology results from the last 24 hrs    Results for orders placed during the hospital encounter of 02/27/21    Adult Transesophageal Echo (HAMLET) W/ Cont if Necessary Per Protocol    Interpretation Summary  · Estimated left ventricular EF = 60% Left ventricular ejection fraction appears to be 56 - 60%. Left ventricular systolic function is normal.  · Saline test results are negative.  · The right atrial cavity is small in size.  · There is moderate calcification of the aortic valve mainly affecting the non-coronary cusp(s).      Assessment/Plan   Assessment & Plan     Active Hospital Problems    Diagnosis  POA   • Acute on chronic  respiratory failure with hypoxia (CMS/[facility]) [J96.21]  Yes   • Morbid obesity with BMI of 40.0-44.9, adult (CMS/[facility]) [E66.01, Z68.41]  Not Applicable   • Severe persistent asthma with acute exacerbation [J45.51]  Unknown   • COPD with acute exacerbation (CMS/[facility]) [J44.1]  Unknown   • Insulin resistance [E88.81]  Unknown   • Type 2 diabetes mellitus with retinopathy, with long-term current use of insulin (CMS/HCC) [E11.319, Z79.4]  Not Applicable   • Chronic diastolic CHF (congestive heart failure) (CMS/[facility]) [I50.32]  Yes   • EDD treated with BiPAP [G47.33]  Unknown       PLAN:   -Acute on chronic respiratory failure/COPD exac  -Asthma exacerbation with failed outpt treatment   -Admit pt to telemetry.  Patient was loaded with 125 Solu-Medrol in the ED.   We will give 80 mg of Solu-Medrol for the next 2 doses and slowly  taper to 60  mg every 8 hours. Increase Symbicort dose for now and continue Duonebs  and Spiriva. Will check sputum cultures and resp panel. Pt was negative for  COVID-19 and has been fully vaccinated.     -Hypoxia-   ABG noted PO2 of 73.8.     -Type 2 DM with significant insulin resistance   -We will continue patient's current dose of Humulin R U-500, 190 units in the  am and 180 units before lunch and dinner. We will also give additional Sliding  scale as needed.     -Obstructive sleep apnea   -Patient will continue use of home BiPAP    -Mild superficial abdominal discomfort in the right mid to lower abdomen   -Patient with possible lipoma versus hernia.  Recommended patient have this  further worked up as an outpatient.    DVT prophylaxis:  Lovenox and SCDs       CODE STATUS:    Code Status and Medical Interventions:   Ordered at: 08/09/21 0629     Level Of Support Discussed With:    Patient     Code Status:    CPR     Medical Interventions (Level of Support Prior to Arrest):    Full       Admission Status:  I believe this patient meets INPATIENT status due to acute resp failure with failed outpt  treatment.  I feel patient’s risk for adverse outcomes and need for care warrant INPATIENT evaluation and I predict the patient’s care encounter to likely last beyond 2 midnights.      Carin Yang,   08/09/21

## 2021-08-09 NOTE — ED PROVIDER NOTES
Subjective   51-year-old male presents emergency department chief complaint shortness of breath.  Patient does have known history of asthma, COPD, CHF, coronary artery disease, diabetes.  Patient is being followed by pulmonologist Dr. Barajas.  Patient has had increased shortness of breath over the last 2 weeks despite steroid therapy.  Patient was instructed to come to emergency department to be admitted to this facility.  Patient denies any fever, chills, body aches, chest pain, dizziness.      History provided by:  Patient   used: No    Shortness of Breath  Severity:  Moderate  Onset quality:  Gradual  Timing:  Intermittent  Progression:  Worsening  Chronicity:  New  Context: not activity, not animal exposure, not occupational exposure, not pollens, not smoke exposure, not strong odors, not URI and not weather changes    Relieved by:  Nothing  Worsened by:  Nothing  Ineffective treatments:  None tried  Associated symptoms: cough    Associated symptoms: no abdominal pain, no chest pain, no diaphoresis, no headaches, no neck pain, no PND, no rash, no sputum production, no syncope, no swollen glands and no vomiting    Risk factors: no recent alcohol use, no family hx of DVT, no hx of cancer, no hx of PE/DVT, no obesity, no oral contraceptive use, no prolonged immobilization, no recent surgery and no tobacco use        Review of Systems   Constitutional: Negative for appetite change, chills, diaphoresis and fatigue.   HENT: Negative.  Negative for dental problem, drooling, facial swelling, hearing loss and postnasal drip.    Eyes: Negative.  Negative for photophobia, redness and itching.   Respiratory: Positive for cough, chest tightness and shortness of breath. Negative for sputum production.    Cardiovascular: Negative.  Negative for chest pain, leg swelling, syncope and PND.   Gastrointestinal: Negative.  Negative for abdominal pain, anal bleeding, blood in stool, diarrhea and vomiting.    Endocrine: Negative.  Negative for cold intolerance, heat intolerance, polydipsia and polyphagia.   Genitourinary: Negative.  Negative for dysuria, enuresis, flank pain, frequency and hematuria.   Musculoskeletal: Negative.  Negative for arthralgias, back pain, gait problem and neck pain.   Skin: Negative.  Negative for color change, pallor and rash.   Neurological: Negative for headaches.   All other systems reviewed and are negative.      Past Medical History:   Diagnosis Date   • Anxiety    • Arthritis    • Asthma    • Brachial neuritis 1/19/2017   • Cellulitis     left arm and right leg    • Chest pain    • CHF (congestive heart failure) (CMS/[facility])     Patient reported history May 2020    • COPD (chronic obstructive pulmonary disease) (CMS/[facility])    • Coronary artery disease     Patient reported CABG , 3 vessel   • Depression    • Diabetes mellitus (CMS/[facility])     diagnosed in 1998, checks fsbg 3-4x/day   • Dizziness    • Edema    • Elevated cholesterol    • GERD (gastroesophageal reflux disease)    • H/O chest x-ray 07/04/2015    Mild Left base atelectasis   • H/O echocardiogram 07/05/2015    Normal LVSF. EF of 60-65%. Grade 1 diastolic dysfunction of the LV myocardium. No evidence of pericardial effusion   • H/O exercise stress test    • Hearing loss     No use of hearing aids   • History of fracture     Reported 2 bones in left foot and a finger   • History of herniated intervertebral disc     History of left L5-S1 disc herniation   • History of pneumonia    • History of pneumonia    • Hyperlipidemia    • Hypertension    • Impaired functional mobility, balance, gait, and endurance    • LOM (loss of memory) 1/19/2017   • Lower back pain     Right   • Measles    • MUSE (nonalcoholic steatohepatitis)    • Neuropathy     feet    • EDD treated with BiPAP     BiPAP HS - instructed to bring mask/machine DOS (setting 13 and 5)   • Palpitations    • Pericardial effusion    • Poor dentition     Patient reported  missing multiple teeth   • Renal disorder    • Seizure disorder (CMS/HCC)     focal seizures   • SOB (shortness of breath)    • Staph infection     back after sugery at the incision site    • Stroke (CMS/HCC)     x2 most recent , slight weakness in hands occasionaly    • Wears glasses        Allergies   Allergen Reactions   • Ajovy [Fremanezumab-Vfrm] Other (See Comments)     Caused tightness of chest, vomiting, pain in both arms.   • Sulfa Antibiotics Anaphylaxis, Nausea And Vomiting and Delirium   • Invokana [Canagliflozin] Unknown - Low Severity     DKA   • Codeine Nausea And Vomiting     Can only take with Phenergan   • Morphine Unknown - Low Severity     Does not work. It causes pain       Past Surgical History:   Procedure Laterality Date   • BACK SURGERY     • CARDIAC CATHETERIZATION     • CARDIAC CATHETERIZATION N/A 8/11/2017    Procedure: Coronary angiography;  Surgeon: Dallas Kim MD;  Location: [facility] CATH INVASIVE LOCATION;  Service:    • CARDIAC CATHETERIZATION N/A 8/11/2017    Procedure: Left Heart Cath;  Surgeon: Dallas Kim MD;  Location: [facility] CATH INVASIVE LOCATION;  Service:    • CARDIAC CATHETERIZATION N/A 8/11/2017    Procedure: Left ventriculography;  Surgeon: Dallas Kim MD;  Location: [facility] CATH INVASIVE LOCATION;  Service:    • CARDIAC CATHETERIZATION      2002 x1 stent,  x1 stent   • CARDIOVASCULAR STRESS TEST  07/03/2017    WITH DR KIM AT [facility]   • CARPAL TUNNEL RELEASE Right    • CATARACT EXTRACTION W/ INTRAOCULAR LENS IMPLANT Right 6/12/2017    Procedure: CATARACT PHACO EXTRACTION WITH INTRAOCULAR LENS IMPLANT RIGHT ;  Surgeon: Natalie Cao MD;  Location: [facility] OR;  Service:    • CATARACT EXTRACTION W/ INTRAOCULAR LENS IMPLANT Left 7/10/2017    Procedure: CATARACT PHACO EXTRACTION WITH INTRAOCULAR LENS IMPLANT LEFT;  Surgeon: Natalie Cao MD;  Location: [facility] OR;  Service:    • CHOLECYSTECTOMY     • COLONOSCOPY     • COLONOSCOPY N/A 7/2/2020     Procedure: COLONOSCOPY;  Surgeon: Petra Crowell MD;  Location: [facility] ENDOSCOPY;  Service: Gastroenterology;  Laterality: N/A;  poor prep   • CORONARY ANGIOPLASTY WITH STENT PLACEMENT      X1-LAD   • CORONARY ARTERY BYPASS GRAFT N/A 8/15/2017    Procedure: CORONARY ARTERY BYPASS GRAFTING x 3 UTILIZING THE LEFT INTERNAL MAMMARY ARTERY WITH ENDOSCOPIC VEIN HARVESTING OF THE RIGHT GREATER SAPHENOUS VEIN, HAMLET, LAD ENDARECTOMY;  Surgeon: London Damon MD;  Location:  [facility] OR;  Service:    • ENDOSCOPY     • EYE CAPSULOTOMY WITH LASER Right 11/25/2019    Procedure: EYE CAPSULOTOMY WITH LASER RIGHT;  Surgeon: Natalie Cao MD;  Location: [facility] OR;  Service: Ophthalmology   • EYE CAPSULOTOMY WITH LASER Left 12/9/2019    Procedure: EYE CAPSULOTOMY WITH LASER LEFT;  Surgeon: Natalie Cao MD;  Location: [facility] OR;  Service: Ophthalmology   • EYE SURGERY      cataract surgery both eyes   • INTERVENTIONAL RADIOLOGY PROCEDURE Bilateral 12/31/2019    Procedure: Carotid Cerebral Angiogram;  Surgeon: Damian Dubois MD;  Location: [facility] CATH INVASIVE LOCATION;  Service: Interventional Radiology   • KNEE ARTHROSCOPY Bilateral    • KNEE ARTHROSCOPY Right 2010    Dr Lewis   • KNEE ARTHROSCOPY Left 2008    Dr Jameson   • KNEE MENISCECTOMY Right 10/15/2020    Procedure: Right knee arthroscopy with partial medial and lateral meniscectomy and three compartment chondroplasty.;  Surgeon: South Lewis MD;  Location:  [facility];  Service: Orthopedics;  Laterality: Right;   • MOUTH SURGERY      Oral extractions   • NEUROPLASTY / TRANSPOSITION ULNAR NERVE AT ELBOW Left    • OTHER SURGICAL HISTORY      Foraminotomy and discectomy   • PERICARDIAL WINDOW N/A 8/25/2017    Procedure: PERICARDIAL WINDOW;  Surgeon: Freeman Phillips MD;  Location:  [facility] OR;  Service:        Family History   Problem Relation Age of Onset   • Hypertension Mother    • Arthritis Mother    • Alcohol abuse Father    • Heart disease Other    • Stroke Other     • Hypertension Other    • Other Other         Neurologic disorder   • Parkinsonism Other    • Stroke Other    • Heart disease Other    • Hypertension Other    • Heart disease Other    • Stroke Other    • Hypertension Other    • Colon cancer Neg Hx        Social History     Socioeconomic History   • Marital status:      Spouse name: Not on file   • Number of children: 1   • Years of education: Not on file   • Highest education level: Not on file   Tobacco Use   • Smoking status: Former Smoker     Packs/day: 1.00     Years: 10.00     Pack years: 10.00     Types: Cigarettes     Quit date: 1998     Years since quittin.6   • Smokeless tobacco: Former User     Types: Snuff     Quit date:    Substance and Sexual Activity   • Alcohol use: Yes     Comment: 3 drinks a year   • Drug use: No   • Sexual activity: Defer           Objective   Physical Exam  Vitals and nursing note reviewed.   Constitutional:       General: He is not in acute distress.     Appearance: He is well-developed and normal weight. He is not ill-appearing or toxic-appearing.   HENT:      Head: Normocephalic and atraumatic.      Mouth/Throat:      Mouth: Mucous membranes are moist.      Pharynx: Oropharynx is clear. No pharyngeal swelling or oropharyngeal exudate.   Eyes:      Extraocular Movements: Extraocular movements intact.      Pupils: Pupils are equal, round, and reactive to light.   Neck:      Thyroid: No thyromegaly.      Vascular: No hepatojugular reflux or JVD.      Trachea: No tracheal deviation.   Cardiovascular:      Rate and Rhythm: Normal rate and regular rhythm.  No extrasystoles are present.     Pulses: Normal pulses. No decreased pulses.           Carotid pulses are on the right side with bruit and on the left side with bruit.     Heart sounds: No murmur heard.   No friction rub. No gallop.    Pulmonary:      Effort: Pulmonary effort is normal. No tachypnea, bradypnea, accessory muscle usage or respiratory distress.       Breath sounds: Examination of the right-upper field reveals decreased breath sounds and wheezing. Examination of the left-upper field reveals decreased breath sounds and wheezing. Examination of the right-middle field reveals decreased breath sounds and wheezing. Examination of the left-middle field reveals decreased breath sounds and wheezing. Examination of the right-lower field reveals wheezing. Examination of the left-lower field reveals wheezing. Decreased breath sounds and wheezing present.   Chest:      Chest wall: No mass, deformity, tenderness or crepitus.   Abdominal:      Palpations: Abdomen is soft. There is no hepatomegaly, splenomegaly or mass.      Tenderness: There is no abdominal tenderness.   Musculoskeletal:         General: Normal range of motion.      Cervical back: Normal range of motion.      Right lower leg: No tenderness. No edema.      Left lower leg: No tenderness. No edema.   Lymphadenopathy:      Cervical: No cervical adenopathy.   Skin:     General: Skin is warm and dry.      Coloration: Skin is not cyanotic or pale.      Findings: No ecchymosis, erythema or rash.   Neurological:      General: No focal deficit present.      Mental Status: He is alert and oriented to person, place, and time.      Cranial Nerves: No cranial nerve deficit.      Motor: No weakness.   Psychiatric:         Mood and Affect: Mood normal. Mood is not anxious.         Behavior: Behavior normal. Behavior is not agitated.         Procedures           ED Course  ED Course as of Aug 09 1743   Mon Aug 09, 2021   1553 Discussed care with Dr. Barajas, states patient has been on intermittent steroids over the last 2 weeks.  Has failed to improve.    []   1616 EKG interpreted by me.  Sinus rhythm.  Rate of 80.  No ST segment or T wave changes.  Normal EKG    []   1740 Discussed care with Dr. Yang. Patient will be admitted. Patient is stable at this time.     []      ED Course User Index  [] Oz Calloway  HAILEY Chadwick  [CG] Deep Avitia, DO                                           MDM    Final diagnoses:   Exacerbation of intermittent asthma, unspecified asthma severity       ED Disposition  ED Disposition     ED Disposition Condition Comment    Decision to Admit  Level of Care: Telemetry [5]   Diagnosis: Exacerbation of intermittent asthma, unspecified asthma severity [[id number]]   Admitting Physician: JUDI GARNER [[id number]]   Certification: I Certify That Inpatient Hospital Services Are Medically Necessary For Greater Than 2 Midnights            No follow-up provider specified.       Medication List      No changes were made to your prescriptions during this visit.          Oz Calloway PA-C  08/09/21 1743       Oz Calloway PA-C  08/09/21 1748

## 2021-08-10 LAB
ALBUMIN SERPL-MCNC: 3.7 G/DL (ref 3.5–5.2)
ALBUMIN/GLOB SERPL: 1.3 G/DL
ALP SERPL-CCNC: 87 U/L (ref 39–117)
ALT SERPL W P-5'-P-CCNC: 92 U/L (ref 1–41)
ANION GAP SERPL CALCULATED.3IONS-SCNC: 10.4 MMOL/L (ref 5–15)
AST SERPL-CCNC: 64 U/L (ref 1–40)
B PARAPERT DNA SPEC QL NAA+PROBE: NOT DETECTED
B PERT DNA SPEC QL NAA+PROBE: NOT DETECTED
BACTERIA SPEC RESP CULT: NORMAL
BASOPHILS # BLD AUTO: 0.03 10*3/MM3 (ref 0–0.2)
BASOPHILS NFR BLD AUTO: 0.2 % (ref 0–1.5)
BILIRUB SERPL-MCNC: 0.5 MG/DL (ref 0–1.2)
BUN SERPL-MCNC: 18 MG/DL (ref 8–23)
BUN/CREAT SERPL: 28.1 (ref 7–25)
C PNEUM DNA NPH QL NAA+NON-PROBE: NOT DETECTED
CALCIUM SPEC-SCNC: 9.2 MG/DL (ref 8.6–10.5)
CHLORIDE SERPL-SCNC: 99 MMOL/L (ref 98–107)
CO2 SERPL-SCNC: 23.6 MMOL/L (ref 22–29)
CREAT SERPL-MCNC: 0.64 MG/DL (ref 0.76–1.27)
DEPRECATED RDW RBC AUTO: 46.2 FL (ref 37–54)
EOSINOPHIL # BLD AUTO: 0 10*3/MM3 (ref 0–0.4)
EOSINOPHIL NFR BLD AUTO: 0 % (ref 0.3–6.2)
ERYTHROCYTE [DISTWIDTH] IN BLOOD BY AUTOMATED COUNT: 16.5 % (ref 12.3–15.4)
FERRITIN SERPL-MCNC: 36.49 NG/ML (ref 30–400)
FLUAV SUBTYP SPEC NAA+PROBE: NOT DETECTED
FLUBV RNA ISLT QL NAA+PROBE: NOT DETECTED
GFR SERPL CREATININE-BSD FRML MDRD: 128 ML/MIN/1.73
GLOBULIN UR ELPH-MCNC: 2.8 GM/DL
GLUCOSE BLDC GLUCOMTR-MCNC: 231 MG/DL (ref 70–130)
GLUCOSE BLDC GLUCOMTR-MCNC: 260 MG/DL (ref 70–130)
GLUCOSE BLDC GLUCOMTR-MCNC: 278 MG/DL (ref 70–130)
GLUCOSE BLDC GLUCOMTR-MCNC: 282 MG/DL (ref 70–130)
GLUCOSE SERPL-MCNC: 231 MG/DL (ref 65–99)
HADV DNA SPEC NAA+PROBE: NOT DETECTED
HBA1C MFR BLD: 9.9 % (ref 4.8–5.6)
HCOV 229E RNA SPEC QL NAA+PROBE: NOT DETECTED
HCOV HKU1 RNA SPEC QL NAA+PROBE: NOT DETECTED
HCOV NL63 RNA SPEC QL NAA+PROBE: NOT DETECTED
HCOV OC43 RNA SPEC QL NAA+PROBE: NOT DETECTED
HCT VFR BLD AUTO: 42.4 % (ref 37.5–51)
HGB BLD-MCNC: 13.5 G/DL (ref 13–17.7)
HMPV RNA NPH QL NAA+NON-PROBE: NOT DETECTED
HPIV1 RNA SPEC QL NAA+PROBE: NOT DETECTED
HPIV2 RNA SPEC QL NAA+PROBE: NOT DETECTED
HPIV3 RNA NPH QL NAA+PROBE: NOT DETECTED
HPIV4 P GENE NPH QL NAA+PROBE: NOT DETECTED
IMM GRANULOCYTES # BLD AUTO: 0.32 10*3/MM3 (ref 0–0.05)
IMM GRANULOCYTES NFR BLD AUTO: 2.5 % (ref 0–0.5)
IRON 24H UR-MRATE: 28 MCG/DL (ref 59–158)
IRON SATN MFR SERPL: 7 % (ref 20–50)
LYMPHOCYTES # BLD AUTO: 0.84 10*3/MM3 (ref 0.7–3.1)
LYMPHOCYTES NFR BLD AUTO: 6.6 % (ref 19.6–45.3)
M PNEUMO IGG SER IA-ACNC: NOT DETECTED
MAGNESIUM SERPL-MCNC: 1.9 MG/DL (ref 1.6–2.4)
MCH RBC QN AUTO: 24.6 PG (ref 26.6–33)
MCHC RBC AUTO-ENTMCNC: 31.8 G/DL (ref 31.5–35.7)
MCV RBC AUTO: 77.2 FL (ref 79–97)
MONOCYTES # BLD AUTO: 0.24 10*3/MM3 (ref 0.1–0.9)
MONOCYTES NFR BLD AUTO: 1.9 % (ref 5–12)
NEUTROPHILS NFR BLD AUTO: 11.39 10*3/MM3 (ref 1.7–7)
NEUTROPHILS NFR BLD AUTO: 88.8 % (ref 42.7–76)
NRBC BLD AUTO-RTO: 0 /100 WBC (ref 0–0.2)
PLATELET # BLD AUTO: 202 10*3/MM3 (ref 140–450)
PMV BLD AUTO: 9.9 FL (ref 6–12)
POTASSIUM SERPL-SCNC: 4.5 MMOL/L (ref 3.5–5.2)
PROT SERPL-MCNC: 6.5 G/DL (ref 6–8.5)
QT INTERVAL: 414 MS
QTC INTERVAL: 511 MS
RBC # BLD AUTO: 5.49 10*6/MM3 (ref 4.14–5.8)
RHINOVIRUS RNA SPEC NAA+PROBE: DETECTED
RSV RNA NPH QL NAA+NON-PROBE: NOT DETECTED
SODIUM SERPL-SCNC: 133 MMOL/L (ref 136–145)
TIBC SERPL-MCNC: 378 MCG/DL (ref 298–536)
TRANSFERRIN SERPL-MCNC: 254 MG/DL (ref 200–360)
TROPONIN T SERPL-MCNC: <0.01 NG/ML (ref 0–0.03)
TSH SERPL DL<=0.05 MIU/L-ACNC: 0.54 UIU/ML (ref 0.27–4.2)
WBC # BLD AUTO: 12.82 10*3/MM3 (ref 3.4–10.8)

## 2021-08-10 PROCEDURE — 93005 ELECTROCARDIOGRAM TRACING: CPT | Performed by: INTERNAL MEDICINE

## 2021-08-10 PROCEDURE — 94799 UNLISTED PULMONARY SVC/PX: CPT

## 2021-08-10 PROCEDURE — 99222 1ST HOSP IP/OBS MODERATE 55: CPT | Performed by: INTERNAL MEDICINE

## 2021-08-10 PROCEDURE — 84443 ASSAY THYROID STIM HORMONE: CPT | Performed by: FAMILY MEDICINE

## 2021-08-10 PROCEDURE — 82728 ASSAY OF FERRITIN: CPT | Performed by: FAMILY MEDICINE

## 2021-08-10 PROCEDURE — 99232 SBSQ HOSP IP/OBS MODERATE 35: CPT | Performed by: INTERNAL MEDICINE

## 2021-08-10 PROCEDURE — 80053 COMPREHEN METABOLIC PANEL: CPT | Performed by: FAMILY MEDICINE

## 2021-08-10 PROCEDURE — 83735 ASSAY OF MAGNESIUM: CPT | Performed by: FAMILY MEDICINE

## 2021-08-10 PROCEDURE — 93010 ELECTROCARDIOGRAM REPORT: CPT | Performed by: INTERNAL MEDICINE

## 2021-08-10 PROCEDURE — 97161 PT EVAL LOW COMPLEX 20 MIN: CPT

## 2021-08-10 PROCEDURE — 94640 AIRWAY INHALATION TREATMENT: CPT

## 2021-08-10 PROCEDURE — 84484 ASSAY OF TROPONIN QUANT: CPT | Performed by: FAMILY MEDICINE

## 2021-08-10 PROCEDURE — 83540 ASSAY OF IRON: CPT | Performed by: FAMILY MEDICINE

## 2021-08-10 PROCEDURE — 83036 HEMOGLOBIN GLYCOSYLATED A1C: CPT | Performed by: FAMILY MEDICINE

## 2021-08-10 PROCEDURE — 84466 ASSAY OF TRANSFERRIN: CPT | Performed by: FAMILY MEDICINE

## 2021-08-10 PROCEDURE — 63710000001 INSULIN REGULAR HUMAN (CONC) 500 UNIT/ML SOLUTION PEN-INJECTOR 3 ML PEN: Performed by: FAMILY MEDICINE

## 2021-08-10 PROCEDURE — 63710000001 INSULIN ASPART PER 5 UNITS: Performed by: FAMILY MEDICINE

## 2021-08-10 PROCEDURE — 25010000002 ENOXAPARIN PER 10 MG: Performed by: FAMILY MEDICINE

## 2021-08-10 PROCEDURE — 84484 ASSAY OF TROPONIN QUANT: CPT | Performed by: INTERNAL MEDICINE

## 2021-08-10 PROCEDURE — 85025 COMPLETE CBC W/AUTO DIFF WBC: CPT | Performed by: FAMILY MEDICINE

## 2021-08-10 PROCEDURE — 25010000002 METHYLPREDNISOLONE PER 125 MG: Performed by: FAMILY MEDICINE

## 2021-08-10 PROCEDURE — 87633 RESP VIRUS 12-25 TARGETS: CPT | Performed by: FAMILY MEDICINE

## 2021-08-10 PROCEDURE — 82962 GLUCOSE BLOOD TEST: CPT

## 2021-08-10 RX ORDER — IPRATROPIUM BROMIDE AND ALBUTEROL SULFATE 2.5; .5 MG/3ML; MG/3ML
3 SOLUTION RESPIRATORY (INHALATION)
Status: DISCONTINUED | OUTPATIENT
Start: 2021-08-10 | End: 2021-08-16 | Stop reason: HOSPADM

## 2021-08-10 RX ADMIN — SODIUM CHLORIDE, PRESERVATIVE FREE 10 ML: 5 INJECTION INTRAVENOUS at 20:45

## 2021-08-10 RX ADMIN — SPIRONOLACTONE 25 MG: 25 TABLET, FILM COATED ORAL at 08:38

## 2021-08-10 RX ADMIN — DOXYCYCLINE 100 MG: 100 INJECTION, POWDER, LYOPHILIZED, FOR SOLUTION INTRAVENOUS at 12:26

## 2021-08-10 RX ADMIN — INSULIN HUMAN 190 UNITS: 500 INJECTION, SOLUTION SUBCUTANEOUS at 06:38

## 2021-08-10 RX ADMIN — ENOXAPARIN SODIUM 40 MG: 40 INJECTION SUBCUTANEOUS at 16:01

## 2021-08-10 RX ADMIN — METHYLPREDNISOLONE SODIUM SUCCINATE 80 MG: 125 INJECTION, POWDER, FOR SOLUTION INTRAMUSCULAR; INTRAVENOUS at 08:52

## 2021-08-10 RX ADMIN — IPRATROPIUM BROMIDE AND ALBUTEROL SULFATE 3 ML: .5; 3 SOLUTION RESPIRATORY (INHALATION) at 19:35

## 2021-08-10 RX ADMIN — BUDESONIDE AND FORMOTEROL FUMARATE DIHYDRATE 2 PUFF: 160; 4.5 AEROSOL RESPIRATORY (INHALATION) at 00:24

## 2021-08-10 RX ADMIN — METHYLPREDNISOLONE SODIUM SUCCINATE 60 MG: 125 INJECTION, POWDER, FOR SOLUTION INTRAMUSCULAR; INTRAVENOUS at 23:59

## 2021-08-10 RX ADMIN — LISINOPRIL 5 MG: 5 TABLET ORAL at 08:39

## 2021-08-10 RX ADMIN — CLOPIDOGREL BISULFATE 75 MG: 75 TABLET ORAL at 08:38

## 2021-08-10 RX ADMIN — INSULIN HUMAN 180 UNITS: 500 INJECTION, SOLUTION SUBCUTANEOUS at 16:11

## 2021-08-10 RX ADMIN — LEVETIRACETAM 1000 MG: 500 TABLET ORAL at 08:38

## 2021-08-10 RX ADMIN — DOXYCYCLINE 100 MG: 100 INJECTION, POWDER, LYOPHILIZED, FOR SOLUTION INTRAVENOUS at 23:59

## 2021-08-10 RX ADMIN — GABAPENTIN 600 MG: 300 CAPSULE ORAL at 06:38

## 2021-08-10 RX ADMIN — PANTOPRAZOLE SODIUM 40 MG: 40 TABLET, DELAYED RELEASE ORAL at 20:44

## 2021-08-10 RX ADMIN — INSULIN ASPART 4 UNITS: 100 INJECTION, SOLUTION INTRAVENOUS; SUBCUTANEOUS at 18:40

## 2021-08-10 RX ADMIN — METOPROLOL SUCCINATE 200 MG: 100 TABLET, EXTENDED RELEASE ORAL at 23:59

## 2021-08-10 RX ADMIN — TIOTROPIUM BROMIDE INHALATION SPRAY 2 PUFF: 3.12 SPRAY, METERED RESPIRATORY (INHALATION) at 06:58

## 2021-08-10 RX ADMIN — GABAPENTIN 600 MG: 300 CAPSULE ORAL at 20:44

## 2021-08-10 RX ADMIN — INSULIN ASPART 4 UNITS: 100 INJECTION, SOLUTION INTRAVENOUS; SUBCUTANEOUS at 12:26

## 2021-08-10 RX ADMIN — INSULIN HUMAN 180 UNITS: 500 INJECTION, SOLUTION SUBCUTANEOUS at 20:45

## 2021-08-10 RX ADMIN — BUDESONIDE AND FORMOTEROL FUMARATE DIHYDRATE 2 PUFF: 160; 4.5 AEROSOL RESPIRATORY (INHALATION) at 06:55

## 2021-08-10 RX ADMIN — SODIUM CHLORIDE, PRESERVATIVE FREE 10 ML: 5 INJECTION INTRAVENOUS at 08:54

## 2021-08-10 RX ADMIN — METHYLPREDNISOLONE SODIUM SUCCINATE 60 MG: 125 INJECTION, POWDER, FOR SOLUTION INTRAMUSCULAR; INTRAVENOUS at 16:01

## 2021-08-10 RX ADMIN — CETIRIZINE HYDROCHLORIDE 10 MG: 10 TABLET, FILM COATED ORAL at 20:44

## 2021-08-10 RX ADMIN — LAMOTRIGINE 100 MG: 100 TABLET ORAL at 08:39

## 2021-08-10 RX ADMIN — OXYCODONE HYDROCHLORIDE AND ACETAMINOPHEN 500 MG: 500 TABLET ORAL at 08:39

## 2021-08-10 RX ADMIN — Medication 400 UNITS: at 08:38

## 2021-08-10 RX ADMIN — LAMOTRIGINE 100 MG: 100 TABLET ORAL at 20:44

## 2021-08-10 RX ADMIN — ASPIRIN 325 MG: 325 TABLET, COATED ORAL at 08:39

## 2021-08-10 RX ADMIN — ATORVASTATIN CALCIUM 80 MG: 80 TABLET, FILM COATED ORAL at 20:44

## 2021-08-10 RX ADMIN — VORTIOXETINE 20 MG: 20 TABLET, FILM COATED ORAL at 08:39

## 2021-08-10 RX ADMIN — GABAPENTIN 600 MG: 300 CAPSULE ORAL at 16:01

## 2021-08-10 RX ADMIN — BUDESONIDE AND FORMOTEROL FUMARATE DIHYDRATE 2 PUFF: 160; 4.5 AEROSOL RESPIRATORY (INHALATION) at 19:49

## 2021-08-10 NOTE — CASE MANAGEMENT/SOCIAL WORK
"Discharge Planning Assessment  [facility]     Patient Name: Denzel Harding  MRN: [id number]  Today's Date: 8/10/2021    Admit Date: 8/9/2021    Discharge Needs Assessment     Row Name 08/10/21 1928       Living Environment    Lives With  child(yenni), adult;spouse    Name(s) of Who Lives With Patient  Wife and 27 year old daughter.    Current Living Arrangements  home/apartment/condo    Primary Care Provided by  self    Provides Primary Care For  no one, unable/limited ability to care for self    Family Caregiver if Needed  child(yenni), adult;spouse    Quality of Family Relationships  supportive    Able to Return to Prior Arrangements  yes       Resource/Environmental Concerns    Resource/Environmental Concerns  none    Transportation Concerns  car, none       Transition Planning    Patient/Family Anticipates Transition to  home with family    Transportation Anticipated  family or friend will provide       Discharge Needs Assessment    Readmission Within the Last 30 Days  no previous admission in last 30 days    Equipment Currently Used at Home  cane, quad;bipap;nebulizer;rollator;walker, rolling    Concerns to be Addressed  no discharge needs identified;denies needs/concerns at this time    Anticipated Changes Related to Illness  none    Equipment Needed After Discharge  none        Discharge Plan    Met with patient in room. No POA/Living will.  He said he had filled out the documents but lost them before they were notarized. Danielle Antunez gave him a new copy of \"Conversations that Matter\".  Demographics verified. No home health services. Does have a bipap managed by [facility] [employer] [place]-[phone]-  They are not in the Epic directory, so I can't add them as DME provider. Plan is to go home at [place] with family to transport. CM card given to patient,  password declined, enrolled in meds to beds. Encouraged to call with any discharge needs.        Continued Care and Services - Admitted Since 8/9/2021  "   Coordination has not been started for this encounter.         Demographic Summary     Row Name 08/10/21 1927       General Information    Admission Type  inpatient    Arrived From  emergency department    Referral Source  admission list    Reason for Consult  discharge planning    Preferred Language  English     Used During This Interaction  no        Functional Status     Row Name 08/10/21 1927       Functional Status    Usual Activity Tolerance  moderate    Current Activity Tolerance  moderate       Functional Status, IADL    Medications  independent    Meal Preparation  independent    Housekeeping  assistive person    Laundry  assistive person    Shopping  assistive person       Mental Status    General Appearance WDL  WDL       Mental Status Summary    Recent Changes in Mental Status/Cognitive Functioning  no changes       Employment/    Employment Status  disabled        Psychosocial     Row Name 08/10/21 1928       Values/Beliefs    Spiritual, Cultural Beliefs, Bahai Practices, Values that Affect Care  no       Behavior WDL    Behavior WDL  WDL       Emotion Mood WDL    Emotion/Mood/Affect WDL  WDL       Speech WDL    Speech WDL  WDL       Perceptual State WDL    Perceptual State WDL  WDL       Thought Process WDL    Thought Process WDL  WDL       Intellectual Performance WDL    Intellectual Performance WDL  WDL       Coping/Stress    Major Change/Loss/Stressor  illness    Patient Personal Strengths  humor    Sources of Support  adult child(yenni);spouse    Reaction to Health Status  accepting    Understanding of Condition and Treatment  adequate understanding of medical condition;adequate understanding of treatment       C-SSRS (Recent)    Q1 Wished to be Dead (Past Month)  no    Q2 Suicidal Thoughts (Past Month)  no    Q6 Suicide Behavior (Lifetime)  no       Violence Risk    Feels Like Hurting Others  no    Previous Attempt to Harm Others  no        Abuse/Neglect     Row Name 08/10/21  1928       Personal Safety    Feels Unsafe at Home or Work/School  no    Feels Threatened by Someone  no    Does Anyone Try to Keep You From Having Contact with Others or Doing Things Outside Your Home?  no    Physical Signs of Abuse Present  no        Legal    No documentation.       Substance Abuse    No documentation.       Patient Forms    No documentation.           Tiffanie Mandujano, RN

## 2021-08-10 NOTE — PLAN OF CARE
Goal Outcome Evaluation:      Patient has been short of breath this shift, O2 applied at 2LNC. Solumedrol dose changed. Patient had episode of chest pain. MD aware. VSS.      Progress: no change

## 2021-08-10 NOTE — NURSING NOTE
Referral received for Phase II Cardiac Rehab.  Medical chart reviewed, met with Pt and at this time Pt does not have a qualifying diagnosis for Phase 2 Cardiac Rehab.

## 2021-08-10 NOTE — CONSULTS
Date of consultation:   August 10, 2021    Requested by:   Hospitalist Service.     PCP: Isaura Godoy MD    Reason:  Shortness of breath.  Acute respiratory distress.  Asthma exacerbation.    History of Present Illness:  61 y.o. male  with past medical history significant for severe asthma who started complaining of shortness of breath, cough and phlegm production for the past few days. Patient was not complaining of subjective chills.  The patient says that his symptoms started somewhat slowly. he says that the shortness of breath is worse than baseline.  Shortness of breath seems to be worse with exertion but started worsening even at rest.    he denies any sick contacts.     he was using his medications regularly at home.  The patient says that he also started using rescue medication more frequently without any significant improvement.    Patient was recently treated by our office with steroids, with only mild improvement.    The patient's symptoms continued to worsen and he was admitted to the hospital and pulmonary Consultation was requested for further recommendations.       Review of System: All other review of systems negative except indicated in HPI.    Past Medical History:  Past Medical History:   Diagnosis Date   • Anxiety    • Arthritis    • Asthma    • Brachial neuritis 1/19/2017   • Cellulitis     left arm and right leg    • Chest pain    • CHF (congestive heart failure) (CMS/Formerly [facility])     Patient reported history May 2020    • COPD (chronic obstructive pulmonary disease) (CMS/Formerly [facility])    • Coronary artery disease     Patient reported CABG , 3 vessel   • Depression    • Diabetes mellitus (CMS/Formerly [facility])     diagnosed in 1998, checks fsbg 3-4x/day   • Dizziness    • Edema    • Elevated cholesterol    • GERD (gastroesophageal reflux disease)    • H/O chest x-ray 07/04/2015    Mild Left base atelectasis   • H/O echocardiogram 07/05/2015    Normal LVSF. EF of 60-65%. Grade 1 diastolic dysfunction of the LV  myocardium. No evidence of pericardial effusion   • H/O exercise stress test    • Hearing loss     No use of hearing aids   • History of fracture     Reported 2 bones in left foot and a finger   • History of herniated intervertebral disc     History of left L5-S1 disc herniation   • History of pneumonia    • History of pneumonia    • Hyperlipidemia    • Hypertension    • Impaired functional mobility, balance, gait, and endurance    • LOM (loss of memory) 1/19/2017   • Lower back pain     Right   • Measles    • MUSE (nonalcoholic steatohepatitis)    • Neuropathy     feet    • EDD treated with BiPAP     BiPAP HS - instructed to bring mask/machine DOS (setting 13 and 5)   • Palpitations    • Pericardial effusion    • Poor dentition     Patient reported missing multiple teeth   • Renal disorder    • Seizure disorder (CMS/HCC)     focal seizures   • SOB (shortness of breath)    • Staph infection     back after sugery at the incision site    • Stroke (CMS/HCC)     x2 most recent , slight weakness in hands occasionaly    • Wears glasses          Past Surgical History:  Past Surgical History:   Procedure Laterality Date   • BACK SURGERY     • CARDIAC CATHETERIZATION     • CARDIAC CATHETERIZATION N/A 8/11/2017    Procedure: Coronary angiography;  Surgeon: Dallas Kim MD;  Location: [facility] INVASIVE LOCATION;  Service:    • CARDIAC CATHETERIZATION N/A 8/11/2017    Procedure: Left Heart Cath;  Surgeon: Dallas Kim MD;  Location: [facility] CATH INVASIVE LOCATION;  Service:    • CARDIAC CATHETERIZATION N/A 8/11/2017    Procedure: Left ventriculography;  Surgeon: Dallas Kim MD;  Location: [facility] INVASIVE LOCATION;  Service:    • CARDIAC CATHETERIZATION      2002 x1 stent,  x1 stent   • CARDIOVASCULAR STRESS TEST  07/03/2017    WITH DR KIM AT [facility], [place]   • CARPAL TUNNEL RELEASE Right    • CATARACT EXTRACTION W/ INTRAOCULAR LENS IMPLANT Right 6/12/2017    Procedure: CATARACT PHACO EXTRACTION  WITH INTRAOCULAR LENS IMPLANT RIGHT ;  Surgeon: Natalie Cao MD;  Location: [facility] OR;  Service:    • CATARACT EXTRACTION W/ INTRAOCULAR LENS IMPLANT Left 7/10/2017    Procedure: CATARACT PHACO EXTRACTION WITH INTRAOCULAR LENS IMPLANT LEFT;  Surgeon: Natalie Cao MD;  Location: [facility] OR;  Service:    • CHOLECYSTECTOMY     • COLONOSCOPY     • COLONOSCOPY N/A 7/2/2020    Procedure: COLONOSCOPY;  Surgeon: Petra Crowell MD;  Location: [facility] ENDOSCOPY;  Service: Gastroenterology;  Laterality: N/A;  poor prep   • CORONARY ANGIOPLASTY WITH STENT PLACEMENT      X1-LAD   • CORONARY ARTERY BYPASS GRAFT N/A 8/15/2017    Procedure: CORONARY ARTERY BYPASS GRAFTING x 3 UTILIZING THE LEFT INTERNAL MAMMARY ARTERY WITH ENDOSCOPIC VEIN HARVESTING OF THE RIGHT GREATER SAPHENOUS VEIN, HAMLET, LAD ENDARECTOMY;  Surgeon: London Damon MD;  Location: [facility] OR;  Service:    • ENDOSCOPY     • EYE CAPSULOTOMY WITH LASER Right 11/25/2019    Procedure: EYE CAPSULOTOMY WITH LASER RIGHT;  Surgeon: Natalie Cao MD;  Location: [facility] OR;  Service: Ophthalmology   • EYE CAPSULOTOMY WITH LASER Left 12/9/2019    Procedure: EYE CAPSULOTOMY WITH LASER LEFT;  Surgeon: Natalie Cao MD;  Location: [facility] OR;  Service: Ophthalmology   • EYE SURGERY      cataract surgery both eyes   • INTERVENTIONAL RADIOLOGY PROCEDURE Bilateral 12/31/2019    Procedure: Carotid Cerebral Angiogram;  Surgeon: Damian Dubois MD;  Location: [facility] CATH INVASIVE LOCATION;  Service: Interventional Radiology   • KNEE ARTHROSCOPY Bilateral    • KNEE ARTHROSCOPY Right 2010    Dr Lewis   • KNEE ARTHROSCOPY Left 2008    Dr Jameson   • KNEE MENISCECTOMY Right 10/15/2020    Procedure: Right knee arthroscopy with partial medial and lateral meniscectomy and three compartment chondroplasty.;  Surgeon: South Lewis MD;  Location: [facility] OR;  Service: Orthopedics;  Laterality: Right;   • MOUTH SURGERY      Oral extractions   • NEUROPLASTY / TRANSPOSITION ULNAR NERVE  "AT ELBOW Left    • OTHER SURGICAL HISTORY      Foraminotomy and discectomy   • PERICARDIAL WINDOW N/A 2017    Procedure: PERICARDIAL WINDOW;  Surgeon: Freeman Phillips MD;  Location: [facility];  Service:          Family History:  Family History   Problem Relation Age of Onset   • Hypertension Mother    • Arthritis Mother    • Stomach cancer Mother    • Alcohol abuse Father    • Heart disease Other    • Hypertension Other    • Other Other         Neurologic disorder   • Heart attack Other    • Parkinsonism Other    • Stroke Other    • Heart disease Other    • Hypertension Other    • Heart disease Other    • Stroke Other    • Hypertension Other    • Colon cancer Neg Hx          Social History:  Social History     Socioeconomic History   • Marital status:      Spouse name: Not on file   • Number of children: 1   • Years of education: Not on file   • Highest education level: Not on file   Tobacco Use   • Smoking status: Former Smoker     Packs/day: 1.00     Years: 10.00     Pack years: 10.00     Types: Cigarettes     Quit date: 1998     Years since quittin.6   • Smokeless tobacco: Former User     Types: Snuff     Quit date:    Substance and Sexual Activity   • Alcohol use: Yes     Comment: 3 drinks a year   • Drug use: No   • Sexual activity: Defer         Physical Exam:  /79 (BP Location: Left arm, Patient Position: Sitting)   Pulse 89   Temp 97.7 °F (36.5 °C) (Oral)   Resp 20   Ht 177.8 cm (70\")   Wt (!) 138 kg (304 lb 11.2 oz)   SpO2 95%   BMI 43.72 kg/m²     Constitutional:            Vital signs reviewed                     General: Mild respiratory distress noted.    Eyes:            Extraocular movement was intact.            Pupils appeared equal            Conjunctiva: Pink    ENT:             Hearing was intact              No nasal erythema noted.              Oropharynx was crowded.  No lesions noted.     Neck:             Supple. No JVD noted.              Thyroid gland " did not seem to be enlarged    Cardiovascular:              S1 + S2. Regular.  CABG scar noted.    Lungs/Respiratory:            Respiratory effort was minimally labored.            Decreased Air Entry Bilaterally with mild to moderate wheezing.    Abdomen/GI:            Obese but soft.  Bowel sounds were positive.  No obvious organomegaly.    Musculoskeletal/Extremities:            Trace edema noted.            No cyanosis noted            No clubbing noted            Gait could not be assessed at this time.    Neurologic:             Awake, alert and oriented x 3.             Able to follow simple commands.    Psychiatric:             Affect appeared fair.             Awake, alert and oriented x 3.    Skin:             No obvious rash noted.             Warm and dry        Labs: Reviewed. Pertinent labs were noted.     Results from last 7 days   Lab Units 08/10/21  0556 08/09/21  1629   WBC 10*3/mm3 12.82* 10.23   HEMOGLOBIN g/dL 13.5 13.2   HEMATOCRIT % 42.4 40.4   PLATELETS 10*3/mm3 202 191   NEUTROPHIL % % 88.8* 64.8   NEUTROS ABS 10*3/mm3 11.39* 6.63   EOSINOPHIL % % 0.0* 2.0   EOS ABS 10*3/mm3 0.00 0.20   LYMPHOCYTE % % 6.6* 24.0   LYMPHS ABS 10*3/mm3 0.84 2.46       Results from last 7 days   Lab Units 08/10/21  0556 08/09/21  1629   SODIUM mmol/L 133* 137   POTASSIUM mmol/L 4.5 4.3   CHLORIDE mmol/L 99 100   CO2 mmol/L 23.6 26.9   BUN mg/dL 18 17   CREATININE mg/dL 0.64* 0.83   CALCIUM mg/dL 9.2 9.2   EGFR IF NONAFRICN AM mL/min/1.73 128 95   ANION GAP mmol/L 10.4 10.1   BILIRUBIN mg/dL 0.5 0.4   ALK PHOS U/L 87 93   ALT (SGPT) U/L 92* 90*   AST (SGOT) U/L 64* 63*   GLUCOSE mg/dL 231* 223*   TOTAL PROTEIN g/dL 6.5 6.4   ALBUMIN g/dL 3.70 3.70       Results from last 7 days   Lab Units 08/10/21  0556   MAGNESIUM mg/dL 1.9       Lab Results   Component Value Date    PROBNP 103.8 08/09/2021    PROBNP 14.7 04/29/2021    PROBNP 24.1 01/26/2021       Lab Results   Component Value Date    TSH 0.535 08/10/2021     TSH 0.878 01/26/2021    TSH 3.120 08/03/2020       Lab Results   Component Value Date    FREET4 0.99 04/11/2017       Lab Results   Component Value Date    INR 1.05 02/27/2021    INR 0.98 06/17/2020    INR 1.01 10/15/2019       Lab Results   Component Value Date    PROCALCITO 0.10 08/09/2021    PROCALCITO 0.09 11/15/2020    PROCALCITO 0.18 08/03/2020       Lab Results   Component Value Date    CRP 0.43 04/11/2017    CRP 7.3 12/04/2015    CRP 7.8 11/27/2015       Lab Results   Component Value Date    SEDRATE 14 02/01/2018    SEDRATE 2 04/12/2017    SEDRATE 4 04/11/2017       ABG: Reviewed  Lab Results   Component Value Date    PHART 7.427 08/09/2021    DXM2GTK 42.0 08/09/2021    PO2ART 73.8 (L) 08/09/2021    HGBBG 14.5 11/04/2019    Q9CYTOHZ 93.9 (L) 08/09/2021    FCOHB 0.3 (L) 08/14/2015    CARBOXYHGB <0.7 08/09/2021    FMETHB 0.6 08/14/2015         Micro: As of August 10, 2021   Lab Results   Component Value Date    RESPCX Light growth (2+) Normal Respiratory Ana M 12/19/2017    RESPCX Light growth (2+) Klebsiella pneumoniae (A) 08/31/2017    RESPCX Light growth (2+) Normal Respiratory Ana M 08/31/2017     Lab Results   Component Value Date    BLOODCX No growth at less than 24 hours 08/09/2021    BLOODCX No growth at less than 24 hours 08/09/2021    BLOODCX No growth at 5 days 08/03/2020     Lab Results   Component Value Date    URINECX No growth at 2 days 08/15/2017     Lab Results   Component Value Date    MRSACX  10/13/2020     No Methicillin Resistant Staphylococcus aureus isolated     No results found for: MRSAPCR  No results found for: URCX  No components found for: LOWRESPCF  No results found for: THROATCX  No results found for: CULTURES  No components found for: STREPBCX  No results found for: STREPPNEUAG  No results found for: LEGIONELLA  No results found for: MYCOPLASCX  No results found for: GCCX  Lab Results   Component Value Date    WOUNDCX Scant growth (1+) Staphylococcus epidermidis (A) 09/24/2017      Lab Results   Component Value Date    BODYFLDCX No growth at 4 days 08/25/2017       Lab Results   Component Value Date    FLU Negative 11/04/2019    FLU Negative 11/04/2019       Lab Results   Component Value Date    ADENOVIRUS Not Detected 08/10/2021     Lab Results   Component Value Date    PE648M Not Detected 08/10/2021     Lab Results   Component Value Date    CVHKU1 Not Detected 08/10/2021     Lab Results   Component Value Date    CVNL63 Not Detected 08/10/2021     Lab Results   Component Value Date    CVOC43 Not Detected 08/10/2021     Lab Results   Component Value Date    HUMETPNEVS Not Detected 08/10/2021     Lab Results   Component Value Date    HURVEV Detected (A) 08/10/2021     Lab Results   Component Value Date    FLUBPCR Not Detected 08/10/2021     Lab Results   Component Value Date    PARAINFLUE Not Detected 08/10/2021     Lab Results   Component Value Date    PARAFLUV2 Not Detected 08/10/2021     Lab Results   Component Value Date    PARAFLUV3 Not Detected 08/10/2021     Lab Results   Component Value Date    PARAFLUV4 Not Detected 08/10/2021     Lab Results   Component Value Date    BPERTPCR Not Detected 08/10/2021     Lab Results   Component Value Date    [id number] Not Detected 08/03/2020     Lab Results   Component Value Date    CPNEUPCR Not Detected 08/10/2021     Lab Results   Component Value Date    MPNEUMO Not Detected 08/10/2021     Lab Results   Component Value Date    FLUAPCR Not Detected 08/10/2021     Lab Results   Component Value Date    FLUAH3 Not Detected 08/03/2020     Lab Results   Component Value Date    FLUAH1 Not Detected 08/03/2020     Lab Results   Component Value Date    RSV Not Detected 08/10/2021     Lab Results   Component Value Date    BPARAPCR Not Detected 08/10/2021       COVID 19:  Lab Results   Component Value Date    COVID19 Not Detected 08/09/2021           Lab Results   Component Value Date    THCURSCR Negative 10/15/2019     Lab Results   Component Value Date     PCPUR Negative 10/15/2019     Lab Results   Component Value Date    COCAINEUR Negative 10/15/2019     Lab Results   Component Value Date    METAMPSCNUR Negative 10/15/2019     Lab Results   Component Value Date    LABOPIASCN Negative 10/15/2019     Lab Results   Component Value Date    AMPHETSCREEN Negative 10/15/2019     Lab Results   Component Value Date    LABBENZSCN Negative 10/15/2019     Lab Results   Component Value Date    TRICYCLICSCN Positive (A) 10/15/2019     Lab Results   Component Value Date    LABMETHSCN Negative 10/15/2019     Lab Results   Component Value Date    BARBITSCNUR Negative 10/15/2019     Lab Results   Component Value Date    OXYCODONESCN Negative 10/15/2019     Lab Results   Component Value Date    PROPOXSCN Negative 10/15/2019     Lab Results   Component Value Date    BUPRENORSCNU Negative 10/15/2019     No results found for: ETHANOLMGDL  No results found for: ETOHPCT           Imaging Study: Latest imaging studies was reviewed personally.   Imaging Results (Last 72 Hours)     Procedure Component Value Units Date/Time    XR Chest 1 View [[id number]] Collected: 08/10/21 0728     Updated: 08/10/21 0731    Narrative:      PROCEDURE: XR CHEST 1 VW-     HISTORY: SOA     COMPARISON: 7/26/2021.     FINDINGS: The patient is status post median sternotomy and CABG  procedure. The heart is normal in size. The mediastinum is unremarkable.  There are low lung volumes with bibasilar opacities likely atelectasis.  No effusions are evident. There is no pneumothorax.  There are no acute  osseous abnormalities.       Impression:      Low lung volumes with bibasilar opacities felt to reflect  atelectasis.     Continued followup is recommended.     This report was finalized on 8/10/2021 7:29 AM by sApen Decker M.D..            ECHO:  Results for orders placed during the hospital encounter of 02/27/21    Adult Transesophageal Echo (HAMLET) W/ Cont if Necessary Per Protocol    Interpretation Summary  ·  Estimated left ventricular EF = 60% Left ventricular ejection fraction appears to be 56 - 60%. Left ventricular systolic function is normal.  · Saline test results are negative.  · The right atrial cavity is small in size.  · There is moderate calcification of the aortic valve mainly affecting the non-coronary cusp(s).      Adult Transthoracic Echo Complete W/ Cont if Necessary Per Protocol (With Agitated Saline)    Interpretation Summary  1.  Technically difficult study.  2.  Normal left ventricular size and systolic function, LVEF 55-60%.  3.  Indeterminate LV diastolic filling pattern.  4.  Normal right ventricular size and systolic function.  5.  Moderately increased left atrial volume index.  6.  Mild calcification of the aortic valve without significant stenosis.  7.  Mild concentric LVH.      Results for orders placed during the hospital encounter of 01/26/21    Adult Transthoracic Echo Complete W/ Cont if Necessary Per Protocol    Interpretation Summary  1.  Technically difficult study.  2.  Normal left ventricular size and systolic function, LVEF grossly 55-60%.  3.  Normal LV diastolic filling pattern.  4.  Normal right ventricular size and systolic function.  5.  Severely increased left atrial volume index.  6.  Trace mitral regurgitation.        Assessment:  1.  Acute Exacerbation of asthma.  2.  Severe underlying asthma  3.  Obstructive sleep apnea  4.  Morbid obesity  5.  Allergic rhinitis  6.  Coronary disease, status post CABG.  7.  Atelectasis    Discussion/Recommendations:   I have adjusted the nebulized treatments as appropriate.     I have also adjusted the dose of steroids, as appropriate.    From his prior history, he does require high-dose steroids for at least 2-3 days before he can be safely tapered off the steroids.    Given the fact that he has failed to have a sustained response to outpatient high-dose steroids, in the form of Depo-Medrol, he would need to be considered for long tapering  dose of prednisone, even upon discharge.    ABG will be performed if clinically indicated.    Chest x-ray will be repeated, if clinically indicated.    Atelectasis is likely secondary to cough and respiratory distress causing decreased lung volumes.    He does have underlying severe asthma and has been on Dupixent with a recent issue with the medication delivery although he restarted receiving it a few days ago.    He will need to continue using his BiPAP.      As far as his vague chest pain is concerned, I have asked the nursing staff to inform the hospitalist if he mentions again that he may benefit from evaluation for angina.    He does mention relief with nitro spray at home, which does raise the possibility of angina especially in the context of coronary artery disease and status post CABG.    Recommendations were also discussed with the referring provider.     I would like to thank you for the opportunity to participate in the care of this patient.  We will communicate changes and recommendations, if and when necessary.      This document was electronically signed by Linda Barajas MD on 08/10/21 at 08:37 EDT      Dictated utilizing Dragon dictation.

## 2021-08-10 NOTE — PLAN OF CARE
Goal Outcome Evaluation:      Spoke with pt at his bedside.  Patient openly shared meaningful stories about his life experiences and oscar in [place].  I provided the pt with a copy of the Conversations That Matter booklet and offered to assist him and his wife in completing the form.  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  We prayed together at his invitation, and I will continue to follow while he is hospitalized.
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
## 2021-08-10 NOTE — NURSING NOTE
Patient called out with chest pain 8/10 in center of chest. VSS. EKG complete at bedside. Dr. Walker at bedside to evaluate patient. Labs reviewed. New order for STAT troponin. Will continue to monitor for changes.

## 2021-08-10 NOTE — THERAPY DISCHARGE NOTE
Patient Name: Denzel Harding  : 1960    MRN: [id number]                              Today's Date: 8/10/2021       Admit Date: 2021    Visit Dx:     ICD-10-CM ICD-9-CM   1. Exacerbation of intermittent asthma, unspecified asthma severity  J45.21 493.92     Patient Active Problem List   Diagnosis   • Coronary arteriosclerosis in native artery   • Lumbar radiculopathy   • Hypercholesterolemia   • Diabetic polyneuropathy associated with type 2 diabetes mellitus (CMS/[facility])   • Migraine with aura and with status migrainosus   • Palpitations   • GERD (gastroesophageal reflux disease)   • Brachial neuritis   • Cervical radiculopathy   • LOM (loss of memory)   • Anxiety   • Diabetes mellitus (CMS/[facility])   • Lower back pain   • Essential hypertension   • History of CVA (cerebrovascular accident)   • Non morbid obesity due to excess calories   • Post-infarction pericarditis (CMS/[facility])   • HCAP (healthcare-associated pneumonia)   • Acute CVA (cerebrovascular accident) (CMS/[facility])   • Acute bronchitis   • Type 2 diabetes mellitus (CMS/[facility])   • Headache syndrome, complicated   • Chronic intractable headache   • EDD treated with BiPAP   • Acute exacerbation of asthma with allergic rhinitis   • Asthma   • Thrush   • Acute diastolic CHF (congestive heart failure) (CMS/[facility])   • Chronic diastolic CHF (congestive heart failure) (CMS/[facility])   • COPD (chronic obstructive pulmonary disease) (CMS/[facility])   • JUAN (acute kidney injury) (CMS/[facility])   • Acute sinusitis   • Felicity ronnie's stroke   • Vertebrobasilar insufficiency   • Personal history of colonic polyps   • Pre-syncope   • Lactic acidosis   • Orthostatic hypotension   • Morbid obesity (CMS/[facility])   • Primary localized osteoarthrosis of the knee, right   • Tear of medial meniscus of right knee, current   • Edema   • Shortness of breath   • Chest pain   • Hyperlipidemia   • Type 2 diabetes mellitus with retinopathy, with long-term current use of insulin (CMS/[facility])   • Insulin long-term  use (CMS/HCC)   • Right sided numbness   • Polypharmacy   • Chronic frontal sinusitis   • 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 type 2 diabetes mellitus (CMS/[facility])   • Otitis externa   • PCO (posterior capsular opacification), bilateral   • Acute on chronic respiratory failure with hypoxia (CMS/[facility])   • Morbid obesity with BMI of 40.0-44.9, adult (CMS/[facility])   • Severe persistent asthma with acute exacerbation   • COPD with acute exacerbation (CMS/HCC)   • Insulin resistance   • Abdominal discomfort     Past Medical History:   Diagnosis Date   • Anxiety    • Arthritis    • Asthma    • Brachial neuritis 1/19/2017   • Cellulitis     left arm and right leg    • Chest pain    • CHF (congestive heart failure) (CMS/HCC)     Patient reported history May 2020    • COPD (chronic obstructive pulmonary disease) (CMS/HCC)    • Coronary artery disease     Patient reported CABG , 3 vessel   • Depression    • Diabetes mellitus (CMS/HCC)     diagnosed in 1998, checks fsbg 3-4x/day   • Dizziness    • Edema    • Elevated cholesterol    • GERD (gastroesophageal reflux disease)    • H/O chest x-ray 07/04/2015    Mild Left base atelectasis   • H/O echocardiogram 07/05/2015    Normal LVSF. EF of 60-65%. Grade 1 diastolic dysfunction of the LV myocardium. No evidence of pericardial effusion   • H/O exercise stress test    • Hearing loss     No use of hearing aids   • History of fracture     Reported 2 bones in left foot and a finger   • History of herniated intervertebral disc     History of left L5-S1 disc herniation   • History of pneumonia    • History of pneumonia    • Hyperlipidemia    • Hypertension    • Impaired functional mobility, balance, gait, and endurance    • LOM (loss of memory) 1/19/2017   • Lower back pain     Right   • Measles    • MUSE (nonalcoholic steatohepatitis)    • Neuropathy     feet    • EDD treated with BiPAP     BiPAP HS - instructed to bring  mask/machine DOS (setting 13 and 5)   • Palpitations    • Pericardial effusion    • Poor dentition     Patient reported missing multiple teeth   • Renal disorder    • Seizure disorder (CMS/HCC)     focal seizures   • SOB (shortness of breath)    • Staph infection     back after sugery at the incision site    • Stroke (CMS/HCC)     x2 most recent , slight weakness in hands occasionaly    • Wears glasses      Past Surgical History:   Procedure Laterality Date   • BACK SURGERY     • CARDIAC CATHETERIZATION     • CARDIAC CATHETERIZATION N/A 8/11/2017    Procedure: Coronary angiography;  Surgeon: Dallas Kim MD;  Location: [facility] CATH INVASIVE LOCATION;  Service:    • CARDIAC CATHETERIZATION N/A 8/11/2017    Procedure: Left Heart Cath;  Surgeon: Dallas Kim MD;  Location: [facility] CATH INVASIVE LOCATION;  Service:    • CARDIAC CATHETERIZATION N/A 8/11/2017    Procedure: Left ventriculography;  Surgeon: Dallas Kim MD;  Location: [facility] CATH INVASIVE LOCATION;  Service:    • CARDIAC CATHETERIZATION      2002 x1 stent,  x1 stent   • CARDIOVASCULAR STRESS TEST  07/03/2017    WITH DR KIM AT [facility]   • CARPAL TUNNEL RELEASE Right    • CATARACT EXTRACTION W/ INTRAOCULAR LENS IMPLANT Right 6/12/2017    Procedure: CATARACT PHACO EXTRACTION WITH INTRAOCULAR LENS IMPLANT RIGHT ;  Surgeon: Natalie Cao MD;  Location: [facility] OR;  Service:    • CATARACT EXTRACTION W/ INTRAOCULAR LENS IMPLANT Left 7/10/2017    Procedure: CATARACT PHACO EXTRACTION WITH INTRAOCULAR LENS IMPLANT LEFT;  Surgeon: Natalie Cao MD;  Location: [facility] OR;  Service:    • CHOLECYSTECTOMY     • COLONOSCOPY     • COLONOSCOPY N/A 7/2/2020    Procedure: COLONOSCOPY;  Surgeon: Petra Crowell MD;  Location: [facility] ENDOSCOPY;  Service: Gastroenterology;  Laterality: N/A;  poor prep   • CORONARY ANGIOPLASTY WITH STENT PLACEMENT      X1-LAD   • CORONARY ARTERY BYPASS GRAFT N/A 8/15/2017    Procedure: CORONARY ARTERY BYPASS  GRAFTING x 3 UTILIZING THE LEFT INTERNAL MAMMARY ARTERY WITH ENDOSCOPIC VEIN HARVESTING OF THE RIGHT GREATER SAPHENOUS VEIN, HAMLET, LAD ENDARECTOMY;  Surgeon: London Damon MD;  Location:  [facility] OR;  Service:    • ENDOSCOPY     • EYE CAPSULOTOMY WITH LASER Right 11/25/2019    Procedure: EYE CAPSULOTOMY WITH LASER RIGHT;  Surgeon: Natalie Cao MD;  Location:  [facility] OR;  Service: Ophthalmology   • EYE CAPSULOTOMY WITH LASER Left 12/9/2019    Procedure: EYE CAPSULOTOMY WITH LASER LEFT;  Surgeon: Natalie Cao MD;  Location:  [facility] OR;  Service: Ophthalmology   • EYE SURGERY      cataract surgery both eyes   • INTERVENTIONAL RADIOLOGY PROCEDURE Bilateral 12/31/2019    Procedure: Carotid Cerebral Angiogram;  Surgeon: Damian Dubois MD;  Location: [facility] CATH INVASIVE LOCATION;  Service: Interventional Radiology   • KNEE ARTHROSCOPY Bilateral    • KNEE ARTHROSCOPY Right 2010    Dr Lewis   • KNEE ARTHROSCOPY Left 2008    Dr Jameson   • KNEE MENISCECTOMY Right 10/15/2020    Procedure: Right knee arthroscopy with partial medial and lateral meniscectomy and three compartment chondroplasty.;  Surgeon: South Lewis MD;  Location: [facility] OR;  Service: Orthopedics;  Laterality: Right;   • MOUTH SURGERY      Oral extractions   • NEUROPLASTY / TRANSPOSITION ULNAR NERVE AT ELBOW Left    • OTHER SURGICAL HISTORY      Foraminotomy and discectomy   • PERICARDIAL WINDOW N/A 8/25/2017    Procedure: PERICARDIAL WINDOW;  Surgeon: Freeman Phillips MD;  Location:  [facility] OR;  Service:      General Information     Row Name 08/10/21 1235          Physical Therapy Time and Intention    Document Type  discharge evaluation/summary  -MS     Mode of Treatment  physical therapy  -MS     Row Name 08/10/21 1235          General Information    Patient Profile Reviewed  yes  -MS     Prior Level of Function  independent:;all household mobility  -MS     Existing Precautions/Restrictions  fall  -MS     Barriers to Rehab  none identified  -MS      Row Name 08/10/21 1235          Living Environment    Lives With  spouse;child(yenni), adult  -MS     Row Name 08/10/21 1235          Home Main Entrance    Number of Stairs, Main Entrance  none;other (see comments) ramp  -MS     Row Name 08/10/21 1235          Cognition    Orientation Status (Cognition)  oriented x 4  -MS     Row Name 08/10/21 1235          Safety Issues, Functional Mobility    Safety Issues Affecting Function (Mobility)  insight into deficits/self-awareness  -MS       User Key  (r) = Recorded By, (t) = Taken By, (c) = Cosigned By    Initials Name Provider Type    MS Loco Miller, PT Physical Therapist        Mobility     Row Name 08/10/21 1236          Bed Mobility    Bed Mobility  bed mobility (all) activities  -MS     All Activities, [place] (Bed Mobility)  modified independence  -MS     Assistive Device (Bed Mobility)  bed rails;head of bed elevated  -MS       User Key  (r) = Recorded By, (t) = Taken By, (c) = Cosigned By    Initials Name Provider Type    MS Loco Miller, PT Physical Therapist        Obj/Interventions     Row Name 08/10/21 1237          Range of Motion Comprehensive    General Range of Motion  bilateral lower extremity ROM WFL  -MS     Row Name 08/10/21 1237          Strength Comprehensive (MMT)    General Manual Muscle Testing (MMT) Assessment  lower extremity strength deficits identified  -MS     Comment, General Manual Muscle Testing (MMT) Assessment  4+/5 R LE and 3-/5 on the L LE from previous stroke  -MS       User Key  (r) = Recorded By, (t) = Taken By, (c) = Cosigned By    Initials Name Provider Type    MS Loco Miller, PT Physical Therapist        Goals/Plan    No documentation.       Clinical Impression     Row Name 08/10/21 1237          Pain    Additional Documentation  Pain Scale: Numbers Pre/Post-Treatment (Group)  -MS     Row Name 08/10/21 1237          Pain Scale: Numbers Pre/Post-Treatment    Pretreatment Pain Rating  7/10  -MS     Posttreatment  Pain Rating  7/10  -MS     Pain Location - Side  Bilateral  -MS     Pain Location  back  -MS     Pain Intervention(s)  Repositioned;Ambulation/increased activity  -MS     Row Name 08/10/21 1237          Plan of Care Review    Plan of Care Reviewed With  patient  -MS     Progress  no change  -MS     Outcome Summary  Pt seen for PT evaluation this date. Pt was able  to transfer to B with MI. Pt states that he does not feel like he has any new weakness, but states that he just needs to get his breathing better. Pt states that he doesn't feel he needs PTx at this time. Pt has been getting up to the restroom independently. No skilled PT needs identified. Will defer PTx, please reorder if status changes.  -MS     Row Name 08/10/21 1237          Therapy Assessment/Plan (PT)    Criteria for Skilled Interventions Met (PT)  no;no problems identified which require skilled intervention  -MS     Row Name 08/10/21 1237          Vital Signs    O2 Delivery Pre Treatment  room air  -MS     O2 Delivery Intra Treatment  room air  -MS     O2 Delivery Post Treatment  room air  -MS     Pre Patient Position  Supine  -MS     Intra Patient Position  Sitting  -MS     Post Patient Position  Supine  -MS     Row Name 08/10/21 1237          Positioning and Restraints    Pre-Treatment Position  in bed  -MS     Post Treatment Position  bed  -MS     In Bed  fowlers;encouraged to call for assist;call light within reach  -MS       User Key  (r) = Recorded By, (t) = Taken By, (c) = Cosigned By    Initials Name Provider Type    Loco Muller, PT Physical Therapist        Outcome Measures     Row Name 08/10/21 1240          How much help from another person do you currently need...    Turning from your back to your side while in flat bed without using bedrails?  4  -MS     Moving from lying on back to sitting on the side of a flat bed without bedrails?  4  -MS     Moving to and from a bed to a chair (including a wheelchair)?  4  -MS     Standing  up from a chair using your arms (e.g., wheelchair, bedside chair)?  4  -MS     Climbing 3-5 steps with a railing?  4  -MS     To walk in hospital room?  4  -MS     AM-PAC 6 Clicks Score (PT)  24  -MS     Row Name 08/10/21 1240          Functional Assessment    Outcome Measure Options  AM-PAC 6 Clicks Basic Mobility (PT)  -MS       User Key  (r) = Recorded By, (t) = Taken By, (c) = Cosigned By    Initials Name Provider Type    MS Loco Miller PT Physical Therapist        Physical Therapy Education                 Title: PT OT SLP Therapies (Not Started)     Topic: Physical Therapy (In Progress)     Point: Mobility training (Done)     Learning Progress Summary           Patient Acceptance, E, VU by MS at 8/10/2021 1241    Comment: importance of continued mobility                   Point: Home exercise program (Not Started)     Learner Progress:  Not documented in this visit.          Point: Body mechanics (Not Started)     Learner Progress:  Not documented in this visit.          Point: Precautions (Not Started)     Learner Progress:  Not documented in this visit.                      User Key     Initials Effective Dates Name Provider Type Discipline    MS 06/16/21 -  Loco Miller PT Physical Therapist PT              PT Recommendation and Plan     Plan of Care Reviewed With: patient  Progress: no change  Outcome Summary: Pt seen for PT evaluation this date. Pt was able  to transfer to E[facility] with MI. Pt states that he does not feel like he has any new weakness, but states that he just needs to get his breathing better. Pt states that he doesn't feel he needs PTx at this time. Pt has been getting up to the restroom independently. No skilled PT needs identified. Will defer PTx, please reorder if status changes.     Time Calculation:   PT Charges     Row Name 08/10/21 1241             Time Calculation    Start Time  1120  -MS      PT Received On  08/10/21  -MS         Untimed Charges    PT Eval/Re-eval Minutes   38  -MS         Total Minutes    Untimed Charges Total Minutes  38  -MS       Total Minutes  38  -MS        User Key  (r) = Recorded By, (t) = Taken By, (c) = Cosigned By    Initials Name Provider Type    Loco Muller PT Physical Therapist        Therapy Charges for Today     Code Description Service Date Service Provider Modifiers Qty    [id number] HC PT EVAL LOW COMPLEXITY 3 8/10/2021 Loco Miller PT GP 1          PT G-Codes  Outcome Measure Options: AM-PAC 6 Clicks Basic Mobility (PT)  AM-PAC 6 Clicks Score (PT): 24         Loco Miller PT  8/10/2021

## 2021-08-10 NOTE — CONSULTS
"Adult Nutrition  Assessment/PES    Patient Name:  Denzel Harding  YOB: 1960  MRN: [id number]  Admit Date:  8/9/2021    Assessment Date:  8/10/2021    Comments:    Recommend:  1. Consider adding cardiac modification to current diet order as medically appropriate and tolerated.  2. Establish and encourage PO intake.  3. Consider a multivitamin with minerals daily.  4. Continue to monitor and replace electrolytes PRN.    RD to follow pt and available PRN.      Reason for Assessment     Row Name 08/10/21 1101          Reason for Assessment    Reason For Assessment  diagnosis/disease state;identified at risk by screening criteria     Diagnosis  diabetes diagnosis/complications;cardiac disease;liver disease;pulmonary disease;gastrointestinal disease A/C respiratory failure, DM 2, HTN, MUSE, COPD, Abdominal discomfort     Identified At Risk by Screening Criteria  BMI;difficulty chewing/swallowing           Anthropometrics     Row Name 08/10/21 1103 08/10/21 0500       Anthropometrics    Height  177.8 cm (70\")  --    Weight  --  (!) 138 kg (304 lb 11.2 oz)       Ideal Body Weight (IBW)    Ideal Body Weight (IBW) (kg)  76.48  --        Labs/Tests/Procedures/Meds     Row Name 08/10/21 1102          Labs/Procedures/Meds    Lab Results Reviewed  reviewed, pertinent     Lab Results Comments  Low: Na+, Cr High: Gluc, HgbA1c, ALT        Medications    Pertinent Medications Reviewed  reviewed, pertinent     Pertinent Medications Comments  Vitamin C, Lipitor, Vitamin E, NaCl, Protonix         Physical Findings     Row Name 08/10/21 1103          Physical Findings    Overall Physical Appearance  obese         Estimated/Assessed Needs     Row Name 08/10/21 1103          Calculation Measurements    Weight Used For Calculations  138 kg (304 lb 3.8 oz) Actual BW     Height  177.8 cm (70\")        Estimated/Assessed Needs    Additional Documentation  Calorie Requirements (Group);Protein Requirements (Group);[place] " [facility] Equation (Group);Fluid Requirements (Group)        Calorie Requirements    Estimated Calorie Need Method  [facility]     Estimated Calorie Requirement Comment  2853 - 7616        [facility]    RMR ([facility] Equation)  2196.25     [facility] Activity Factors  -- AF 1.3        Protein Requirements    Weight Used For Protein Calculations  138 kg (304 lb 3.8 oz) Actual BW     Est Protein Requirement Amount (gms/kg)  1.0 gm protein 111 - 138 gm     Estimated Protein Requirements (gms/day)  138        Fluid Requirements    Fluid Requirements (mL/day)  2855     Estimated Fluid Requirement Method  other (see comments) 1 mL/kcal     RDA Method (mL)  2855         Nutrition Prescription Ordered     Row Name 08/10/21 1104          Nutrition Prescription PO    Current PO Diet  Regular     Common Modifiers  Consistent Carbohydrate         Evaluation of Received Nutrient/Fluid Intake     Row Name 08/10/21 1105          PO Evaluation    Number of Days PO Intake Evaluated  Insufficient Data               Problem/Interventions:  Problem 1     Row Name 08/10/21 1105          Nutrition Diagnoses Problem 1    Problem 1  Impaired Nutrient Utilization     Etiology (related to)  Medical Diagnosis     Endocrine  DM2     Signs/Symptoms (evidenced by)  Biochemical     Specific Labs Noted  Glucose;HgbA1C         Problem 2     Row Name 08/10/21 1105          Nutrition Diagnoses Problem 2    Problem 2  Overweight/Obesity     Etiology (related to)  Factors Affecting Nutrition     Food Habit/Preferences  Large Meals     Signs/Symptoms (evidenced by)  BMI     BMI  Greater than 40             Intervention Goal     Row Name 08/10/21 1105          Intervention Goal    General  Meet nutritional needs for age/condition;Improved nutrition related lab(s)     PO  Meet estimated needs;Establish PO;PO intake (%)     PO Intake %  50 %     Weight  No significant weight loss         Nutrition Intervention     Row Name  08/10/21 1106          Nutrition Intervention    RD/Tech Action  Follow Tx progress;Encourage intake;Recommend/ordered     Recommended/Ordered  Diet         Nutrition Prescription     Row Name 08/10/21 1106          Nutrition Prescription PO    PO Prescription  Begin/change diet     Begin/Change Diet to  Regular     Common Modifiers  Cardiac;Consistent Carbohydrate     New PO Prescription Ordered?  No, recommended        Other Orders    Obtain Weight  Daily     Obtain Weight Ordered?  No, recommended     Supplement  Vitamin mineral supplement     Supplement Ordered?  No, recommended     Other  Continue to monitor and replace electrolytes PRN         Education/Evaluation     Row Name 08/10/21 1107          Education    Education  Will Instruct as appropriate        Monitor/Evaluation    Monitor  Per protocol;I&O;PO intake;Pertinent labs;Weight;Skin status           Electronically signed by:  Sandy Cleaning RD  08/10/21 11:07 EDT

## 2021-08-10 NOTE — PLAN OF CARE
Goal Outcome Evaluation:  Plan of Care Reviewed With: patient        Progress: no change  Outcome Summary: New Admission

## 2021-08-10 NOTE — PLAN OF CARE
Goal Outcome Evaluation:  Plan of Care Reviewed With: patient        Progress: no change  Outcome Summary: Pt seen for PT evaluation this date. Pt was able  to transfer to EOB with MI. Pt states that he does not feel like he has any new weakness, but states that he just needs to get his breathing better. Pt states that he doesn't feel he needs PTx at this time. Pt has been getting up to the restroom independently. No skilled PT needs identified. Will defer PTx, please reorder if status changes.

## 2021-08-10 NOTE — ACP (ADVANCE CARE PLANNING)
A copy of the Conversations that Matter booklet was provided to the patient.  I will follow up to assist with completion of the Living Will form.

## 2021-08-11 LAB
GLUCOSE BLDC GLUCOMTR-MCNC: 276 MG/DL (ref 70–130)
GLUCOSE BLDC GLUCOMTR-MCNC: 290 MG/DL (ref 70–130)
GLUCOSE BLDC GLUCOMTR-MCNC: 325 MG/DL (ref 70–130)
GLUCOSE BLDC GLUCOMTR-MCNC: 354 MG/DL (ref 70–130)

## 2021-08-11 PROCEDURE — 63710000001 INSULIN ASPART PER 5 UNITS: Performed by: FAMILY MEDICINE

## 2021-08-11 PROCEDURE — 94799 UNLISTED PULMONARY SVC/PX: CPT

## 2021-08-11 PROCEDURE — 25010000002 METHYLPREDNISOLONE PER 40 MG: Performed by: INTERNAL MEDICINE

## 2021-08-11 PROCEDURE — 25010000002 METHYLPREDNISOLONE PER 125 MG: Performed by: FAMILY MEDICINE

## 2021-08-11 PROCEDURE — 25010000002 ENOXAPARIN PER 10 MG: Performed by: FAMILY MEDICINE

## 2021-08-11 PROCEDURE — 99232 SBSQ HOSP IP/OBS MODERATE 35: CPT | Performed by: INTERNAL MEDICINE

## 2021-08-11 PROCEDURE — 82962 GLUCOSE BLOOD TEST: CPT

## 2021-08-11 RX ORDER — BUDESONIDE 0.5 MG/2ML
0.5 INHALANT ORAL
Status: DISCONTINUED | OUTPATIENT
Start: 2021-08-11 | End: 2021-08-11

## 2021-08-11 RX ORDER — METHYLPREDNISOLONE SODIUM SUCCINATE 40 MG/ML
40 INJECTION, POWDER, LYOPHILIZED, FOR SOLUTION INTRAMUSCULAR; INTRAVENOUS EVERY 6 HOURS
Status: DISCONTINUED | OUTPATIENT
Start: 2021-08-11 | End: 2021-08-12

## 2021-08-11 RX ADMIN — GABAPENTIN 600 MG: 300 CAPSULE ORAL at 15:07

## 2021-08-11 RX ADMIN — INSULIN HUMAN 180 UNITS: 500 INJECTION, SOLUTION SUBCUTANEOUS at 15:07

## 2021-08-11 RX ADMIN — LAMOTRIGINE 100 MG: 100 TABLET ORAL at 21:30

## 2021-08-11 RX ADMIN — IPRATROPIUM BROMIDE AND ALBUTEROL SULFATE 3 ML: .5; 3 SOLUTION RESPIRATORY (INHALATION) at 13:27

## 2021-08-11 RX ADMIN — GABAPENTIN 600 MG: 300 CAPSULE ORAL at 06:35

## 2021-08-11 RX ADMIN — LEVETIRACETAM 1000 MG: 500 TABLET ORAL at 08:15

## 2021-08-11 RX ADMIN — INSULIN ASPART 4 UNITS: 100 INJECTION, SOLUTION INTRAVENOUS; SUBCUTANEOUS at 11:46

## 2021-08-11 RX ADMIN — METOPROLOL SUCCINATE 200 MG: 100 TABLET, EXTENDED RELEASE ORAL at 23:12

## 2021-08-11 RX ADMIN — VORTIOXETINE 20 MG: 20 TABLET, FILM COATED ORAL at 08:15

## 2021-08-11 RX ADMIN — TIOTROPIUM BROMIDE INHALATION SPRAY 2 PUFF: 3.12 SPRAY, METERED RESPIRATORY (INHALATION) at 07:14

## 2021-08-11 RX ADMIN — METHYLPREDNISOLONE SODIUM SUCCINATE 60 MG: 125 INJECTION, POWDER, FOR SOLUTION INTRAMUSCULAR; INTRAVENOUS at 08:15

## 2021-08-11 RX ADMIN — METHYLPREDNISOLONE SODIUM SUCCINATE 40 MG: 40 INJECTION, POWDER, FOR SOLUTION INTRAMUSCULAR; INTRAVENOUS at 21:29

## 2021-08-11 RX ADMIN — ENOXAPARIN SODIUM 40 MG: 40 INJECTION SUBCUTANEOUS at 15:07

## 2021-08-11 RX ADMIN — ASPIRIN 325 MG: 325 TABLET, COATED ORAL at 08:14

## 2021-08-11 RX ADMIN — DOXYCYCLINE 100 MG: 100 INJECTION, POWDER, LYOPHILIZED, FOR SOLUTION INTRAVENOUS at 11:46

## 2021-08-11 RX ADMIN — BUDESONIDE AND FORMOTEROL FUMARATE DIHYDRATE 2 PUFF: 160; 4.5 AEROSOL RESPIRATORY (INHALATION) at 07:15

## 2021-08-11 RX ADMIN — Medication 400 UNITS: at 08:14

## 2021-08-11 RX ADMIN — OXYCODONE HYDROCHLORIDE AND ACETAMINOPHEN 500 MG: 500 TABLET ORAL at 08:14

## 2021-08-11 RX ADMIN — BUDESONIDE 0.5 MG: 0.5 INHALANT RESPIRATORY (INHALATION) at 13:27

## 2021-08-11 RX ADMIN — INSULIN HUMAN 180 UNITS: 500 INJECTION, SOLUTION SUBCUTANEOUS at 21:00

## 2021-08-11 RX ADMIN — CLOPIDOGREL BISULFATE 75 MG: 75 TABLET ORAL at 08:15

## 2021-08-11 RX ADMIN — LAMOTRIGINE 100 MG: 100 TABLET ORAL at 08:15

## 2021-08-11 RX ADMIN — IPRATROPIUM BROMIDE AND ALBUTEROL SULFATE 3 ML: .5; 3 SOLUTION RESPIRATORY (INHALATION) at 20:46

## 2021-08-11 RX ADMIN — INSULIN ASPART 6 UNITS: 100 INJECTION, SOLUTION INTRAVENOUS; SUBCUTANEOUS at 17:14

## 2021-08-11 RX ADMIN — ENOXAPARIN SODIUM 40 MG: 40 INJECTION SUBCUTANEOUS at 04:23

## 2021-08-11 RX ADMIN — IPRATROPIUM BROMIDE AND ALBUTEROL SULFATE 3 ML: .5; 3 SOLUTION RESPIRATORY (INHALATION) at 07:13

## 2021-08-11 RX ADMIN — ATORVASTATIN CALCIUM 80 MG: 80 TABLET, FILM COATED ORAL at 21:29

## 2021-08-11 RX ADMIN — LISINOPRIL 5 MG: 5 TABLET ORAL at 08:14

## 2021-08-11 RX ADMIN — GABAPENTIN 600 MG: 300 CAPSULE ORAL at 21:30

## 2021-08-11 RX ADMIN — INSULIN ASPART 4 UNITS: 100 INJECTION, SOLUTION INTRAVENOUS; SUBCUTANEOUS at 06:36

## 2021-08-11 RX ADMIN — METHYLPREDNISOLONE SODIUM SUCCINATE 40 MG: 40 INJECTION, POWDER, FOR SOLUTION INTRAMUSCULAR; INTRAVENOUS at 15:07

## 2021-08-11 RX ADMIN — ZAFIRLUKAST 20 MG: 20 TABLET, COATED ORAL at 21:30

## 2021-08-11 RX ADMIN — BUDESONIDE AND FORMOTEROL FUMARATE DIHYDRATE 2 PUFF: 160; 4.5 AEROSOL RESPIRATORY (INHALATION) at 20:45

## 2021-08-11 RX ADMIN — INSULIN HUMAN 190 UNITS: 500 INJECTION, SOLUTION SUBCUTANEOUS at 06:43

## 2021-08-11 RX ADMIN — DOXYCYCLINE 100 MG: 100 INJECTION, POWDER, LYOPHILIZED, FOR SOLUTION INTRAVENOUS at 23:12

## 2021-08-11 RX ADMIN — CETIRIZINE HYDROCHLORIDE 10 MG: 10 TABLET, FILM COATED ORAL at 21:29

## 2021-08-11 RX ADMIN — SPIRONOLACTONE 25 MG: 25 TABLET, FILM COATED ORAL at 08:14

## 2021-08-11 RX ADMIN — SODIUM CHLORIDE, PRESERVATIVE FREE 10 ML: 5 INJECTION INTRAVENOUS at 08:15

## 2021-08-11 RX ADMIN — PANTOPRAZOLE SODIUM 40 MG: 40 TABLET, DELAYED RELEASE ORAL at 21:30

## 2021-08-11 NOTE — PLAN OF CARE
Goal Outcome Evaluation:      Spoke with pt and his wife at his bedside.  We discussed the decisions that were to be made and completed the Living Will form.  The form was copied for scanning in the pt's medical record and original was returned to the pt. I also enjoyed the conversation with pt and his wife as we talked about their family and their dogs.  I will continue to follow until pt is discharged.
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
## 2021-08-11 NOTE — PROGRESS NOTES
"  CC: COPD/asthma exacerbation.  Failed outpatient therapy.    S: Feels somewhat better although continues to have chest tightness and shortness of breath with minimal exertion.  Also continues to have some cough although feels that it is less productive than it was before.    ROS: Positive for mild cough, exertional shortness of breath. Denies ongoing chest pain, diarrhea or fever.    O: /79 (BP Location: Right arm, Patient Position: Lying)   Pulse 86   Temp 98.4 °F (36.9 °C) (Oral)   Resp 20   Ht 177.8 cm (70\")   Wt (!) 138 kg (304 lb 10.8 oz)   SpO2 98%   BMI 43.72 kg/m²     Vital signs reviewed.  General/Constitutional: Minimal respiratory distress noted.  Neck: No obvious JVD.  Difficult to appreciate due to body habitus.  Cardiovascular: S1 + S2.  Regular.  Sternotomy scar noted.  Lungs/Respiratory: Somewhat decreased air entry bilaterally with bilateral scattered wheezing heard.  Minimal basal crackles noted  Musculoskeletal/Extremities: Trace edema noted.  Neurologic: AAOx3. Was able to follow commands     Labs: Reviewed.   Results from last 7 days   Lab Units 08/10/21  0556 08/09/21  1629   WBC 10*3/mm3 12.82* 10.23   HEMOGLOBIN g/dL 13.5 13.2   HEMATOCRIT % 42.4 40.4   PLATELETS 10*3/mm3 202 191       Results from last 7 days   Lab Units 08/10/21  0556 08/09/21  1629   SODIUM mmol/L 133* 137   POTASSIUM mmol/L 4.5 4.3   CHLORIDE mmol/L 99 100   CO2 mmol/L 23.6 26.9   BUN mg/dL 18 17   CREATININE mg/dL 0.64* 0.83   CALCIUM mg/dL 9.2 9.2   BILIRUBIN mg/dL 0.5 0.4   ALK PHOS U/L 87 93   ALT (SGPT) U/L 92* 90*   AST (SGOT) U/L 64* 63*   GLUCOSE mg/dL 231* 223*   TOTAL PROTEIN g/dL 6.5 6.4   ALBUMIN g/dL 3.70 3.70       Results from last 7 days   Lab Units 08/10/21  0556   MAGNESIUM mg/dL 1.9             Lab Results   Component Value Date    PROCALCITO 0.10 08/09/2021    PROCALCITO 0.09 11/15/2020    PROCALCITO 0.18 08/03/2020       Lab Results   Component Value Date    PROBNP 103.8 08/09/2021    " PROBNP 14.7 04/29/2021    PROBNP 24.1 01/26/2021       Lab Results   Component Value Date    CRP 0.43 04/11/2017    CRP 7.3 12/04/2015    CRP 7.8 11/27/2015       Lab Results   Component Value Date    SEDRATE 14 02/01/2018    SEDRATE 2 04/12/2017    SEDRATE 4 04/11/2017         Micro: As of August 11, 2021   Lab Results   Component Value Date    RESPCX Rejected 08/10/2021    RESPCX Light growth (2+) Normal Respiratory Ana M 12/19/2017    RESPCX Light growth (2+) Klebsiella pneumoniae (A) 08/31/2017    RESPCX Light growth (2+) Normal Respiratory Ana M 08/31/2017     Lab Results   Component Value Date    BLOODCX No growth at 24 hours 08/09/2021    BLOODCX No growth at 24 hours 08/09/2021    BLOODCX No growth at 5 days 08/03/2020     Lab Results   Component Value Date    URINECX No growth at 2 days 08/15/2017     Lab Results   Component Value Date    MRSACX  10/13/2020     No Methicillin Resistant Staphylococcus aureus isolated     No results found for: MRSAPCR  No results found for: URCX  No components found for: LOWRESPCF  No results found for: THROATCX  No results found for: CULTURES  No components found for: STREPBCX  No results found for: STREPPNEUAG  No results found for: LEGIONELLA  No results found for: MYCOPLASCX  No results found for: GCCX  Lab Results   Component Value Date    WOUNDCX Scant growth (1+) Staphylococcus epidermidis (A) 09/24/2017     Lab Results   Component Value Date    BODYFLDCX No growth at 4 days 08/25/2017       Lab Results   Component Value Date    FLU Negative 11/04/2019    FLU Negative 11/04/2019       Lab Results   Component Value Date    ADENOVIRUS Not Detected 08/10/2021     Lab Results   Component Value Date    LC977X Not Detected 08/10/2021     Lab Results   Component Value Date    CVHKU1 Not Detected 08/10/2021     Lab Results   Component Value Date    CVNL63 Not Detected 08/10/2021     Lab Results   Component Value Date    CVOC43 Not Detected 08/10/2021     Lab Results    Component Value Date    HUMETPNEVS Not Detected 08/10/2021     Lab Results   Component Value Date    HURVEV Detected (A) 08/10/2021     Lab Results   Component Value Date    FLUBPCR Not Detected 08/10/2021     Lab Results   Component Value Date    PARAINFLUE Not Detected 08/10/2021     Lab Results   Component Value Date    PARAFLUV2 Not Detected 08/10/2021     Lab Results   Component Value Date    PARAFLUV3 Not Detected 08/10/2021     Lab Results   Component Value Date    PARAFLUV4 Not Detected 08/10/2021     Lab Results   Component Value Date    BPERTPCR Not Detected 08/10/2021     Lab Results   Component Value Date    QGHFZ21683 Not Detected 08/03/2020     Lab Results   Component Value Date    CPNEUPCR Not Detected 08/10/2021     Lab Results   Component Value Date    MPNEUMO Not Detected 08/10/2021     Lab Results   Component Value Date    FLUAPCR Not Detected 08/10/2021     Lab Results   Component Value Date    FLUAH3 Not Detected 08/03/2020     Lab Results   Component Value Date    FLUAH1 Not Detected 08/03/2020     Lab Results   Component Value Date    RSV Not Detected 08/10/2021     Lab Results   Component Value Date    BPARAPCR Not Detected 08/10/2021       COVID 19:  Lab Results   Component Value Date    COVID19 Not Detected 08/09/2021         ABG: Reviewed  Lab Results   Component Value Date    PHART 7.427 08/09/2021    VAZ7UBN 42.0 08/09/2021    PO2ART 73.8 (L) 08/09/2021    HGBBG 14.5 11/04/2019    P5NWDLKT 93.9 (L) 08/09/2021    FCOHB 0.3 (L) 08/14/2015    CARBOXYHGB <0.7 08/09/2021    FMETHB 0.6 08/14/2015         CXRay: Latest imaging study was reviewed personally.   Imaging Results (Last 24 Hours)     ** No results found for the last 24 hours. **          Assessment & Recommendations/Plan:   1.  Acute asthma exacerbation  2.  Obstructive sleep apnea  3.  Allergic rhinitis  4.  Morbid obesity  5.  Chest pain/coronary artery disease.    Failed outpatient treatment of asthma although multiple rounds of  steroids were attempted.  Knowing this patient's history and need for slow tapering of steroids over a few days rather than 1 to 2 days, I would suggest continuing Solu-Medrol at a dose of 40 mg every 6 hourly for another 24 hours.  I would suggest decreasing Solu-Medrol tomorrow, if the patient continues to feel better, to 40 mg every 8 hourly.  If p.o. prednisone started, he will need long tapering dose of prednisone.  Nebulized treatments have been adjusted, as clinically indicated.  Patient is to continue using his CPAP device.  Chest pain could be secondary to coronary spasm?  Although his troponins and EKG were unremarkable at the time of chest pain.  He does have coronary artery disease and is status post CABG.    We have reviewed patient's current orders and changes, if any, have been suggested to primary care team. Plan was also discussed with nursing staff, as necessary.       This document was electronically signed by Linda Barajas MD on 08/11/21 at 12:33 EDT      Dictated utilizing Dragon dictation.

## 2021-08-11 NOTE — ACP (ADVANCE CARE PLANNING)
I spoke with pt and his wife about the decisions to be made to complete the Living Will form.  The form was completed and copies made for scanning into pt's medical record.  Original copies were returned to the pt.  Pt's wife Carol Harding was designated as his decision maker.

## 2021-08-12 LAB
ANION GAP SERPL CALCULATED.3IONS-SCNC: 12.2 MMOL/L (ref 5–15)
BUN SERPL-MCNC: 21 MG/DL (ref 8–23)
BUN/CREAT SERPL: 26.3 (ref 7–25)
CALCIUM SPEC-SCNC: 9.3 MG/DL (ref 8.6–10.5)
CHLORIDE SERPL-SCNC: 98 MMOL/L (ref 98–107)
CO2 SERPL-SCNC: 23.8 MMOL/L (ref 22–29)
CREAT SERPL-MCNC: 0.8 MG/DL (ref 0.76–1.27)
DEPRECATED RDW RBC AUTO: 48.5 FL (ref 37–54)
ERYTHROCYTE [DISTWIDTH] IN BLOOD BY AUTOMATED COUNT: 17.1 % (ref 12.3–15.4)
GFR SERPL CREATININE-BSD FRML MDRD: 99 ML/MIN/1.73
GLUCOSE BLDC GLUCOMTR-MCNC: 276 MG/DL (ref 70–130)
GLUCOSE BLDC GLUCOMTR-MCNC: 294 MG/DL (ref 70–130)
GLUCOSE BLDC GLUCOMTR-MCNC: 346 MG/DL (ref 70–130)
GLUCOSE BLDC GLUCOMTR-MCNC: 385 MG/DL (ref 70–130)
GLUCOSE SERPL-MCNC: 285 MG/DL (ref 65–99)
HCT VFR BLD AUTO: 36.3 % (ref 37.5–51)
HGB BLD-MCNC: 11.5 G/DL (ref 13–17.7)
MCH RBC QN AUTO: 24.7 PG (ref 26.6–33)
MCHC RBC AUTO-ENTMCNC: 31.7 G/DL (ref 31.5–35.7)
MCV RBC AUTO: 78.1 FL (ref 79–97)
PLATELET # BLD AUTO: 183 10*3/MM3 (ref 140–450)
PMV BLD AUTO: 9.9 FL (ref 6–12)
POTASSIUM SERPL-SCNC: 5 MMOL/L (ref 3.5–5.2)
PROCALCITONIN SERPL-MCNC: 0.08 NG/ML (ref 0–0.25)
QT INTERVAL: 390 MS
QTC INTERVAL: 500 MS
RBC # BLD AUTO: 4.65 10*6/MM3 (ref 4.14–5.8)
SODIUM SERPL-SCNC: 134 MMOL/L (ref 136–145)
WBC # BLD AUTO: 15.11 10*3/MM3 (ref 3.4–10.8)

## 2021-08-12 PROCEDURE — 94799 UNLISTED PULMONARY SVC/PX: CPT

## 2021-08-12 PROCEDURE — 63710000001 PREDNISONE PER 1 MG: Performed by: INTERNAL MEDICINE

## 2021-08-12 PROCEDURE — 63710000001 INSULIN ASPART PER 5 UNITS: Performed by: FAMILY MEDICINE

## 2021-08-12 PROCEDURE — 25010000002 METHYLPREDNISOLONE PER 40 MG: Performed by: INTERNAL MEDICINE

## 2021-08-12 PROCEDURE — 85027 COMPLETE CBC AUTOMATED: CPT | Performed by: INTERNAL MEDICINE

## 2021-08-12 PROCEDURE — 99232 SBSQ HOSP IP/OBS MODERATE 35: CPT | Performed by: INTERNAL MEDICINE

## 2021-08-12 PROCEDURE — 25010000002 ENOXAPARIN PER 10 MG: Performed by: FAMILY MEDICINE

## 2021-08-12 PROCEDURE — 94660 CPAP INITIATION&MGMT: CPT

## 2021-08-12 PROCEDURE — 80048 BASIC METABOLIC PNL TOTAL CA: CPT | Performed by: INTERNAL MEDICINE

## 2021-08-12 PROCEDURE — 84145 PROCALCITONIN (PCT): CPT | Performed by: INTERNAL MEDICINE

## 2021-08-12 PROCEDURE — 82962 GLUCOSE BLOOD TEST: CPT

## 2021-08-12 RX ORDER — ISOSORBIDE MONONITRATE 60 MG/1
60 TABLET, EXTENDED RELEASE ORAL
Status: DISCONTINUED | OUTPATIENT
Start: 2021-08-12 | End: 2021-08-16 | Stop reason: HOSPADM

## 2021-08-12 RX ORDER — L.ACID,PARA/B.BIFIDUM/S.THERM 8B CELL
1 CAPSULE ORAL 2 TIMES DAILY
Status: DISCONTINUED | OUTPATIENT
Start: 2021-08-12 | End: 2021-08-16 | Stop reason: HOSPADM

## 2021-08-12 RX ORDER — DOXYCYCLINE 100 MG/1
100 CAPSULE ORAL EVERY 12 HOURS SCHEDULED
Status: COMPLETED | OUTPATIENT
Start: 2021-08-12 | End: 2021-08-15

## 2021-08-12 RX ORDER — PREDNISONE 20 MG/1
40 TABLET ORAL
Status: DISCONTINUED | OUTPATIENT
Start: 2021-08-12 | End: 2021-08-13

## 2021-08-12 RX ADMIN — INSULIN ASPART 6 UNITS: 100 INJECTION, SOLUTION INTRAVENOUS; SUBCUTANEOUS at 12:25

## 2021-08-12 RX ADMIN — GABAPENTIN 600 MG: 300 CAPSULE ORAL at 16:09

## 2021-08-12 RX ADMIN — INSULIN HUMAN 180 UNITS: 500 INJECTION, SOLUTION SUBCUTANEOUS at 16:11

## 2021-08-12 RX ADMIN — GABAPENTIN 600 MG: 300 CAPSULE ORAL at 06:35

## 2021-08-12 RX ADMIN — ASPIRIN 325 MG: 325 TABLET, COATED ORAL at 10:22

## 2021-08-12 RX ADMIN — INSULIN HUMAN 190 UNITS: 500 INJECTION, SOLUTION SUBCUTANEOUS at 06:36

## 2021-08-12 RX ADMIN — DOXYCYCLINE 100 MG: 100 CAPSULE ORAL at 21:25

## 2021-08-12 RX ADMIN — VORTIOXETINE 20 MG: 20 TABLET, FILM COATED ORAL at 10:20

## 2021-08-12 RX ADMIN — IPRATROPIUM BROMIDE AND ALBUTEROL SULFATE 3 ML: .5; 3 SOLUTION RESPIRATORY (INHALATION) at 01:26

## 2021-08-12 RX ADMIN — INSULIN ASPART 4 UNITS: 100 INJECTION, SOLUTION INTRAVENOUS; SUBCUTANEOUS at 06:35

## 2021-08-12 RX ADMIN — LAMOTRIGINE 100 MG: 100 TABLET ORAL at 21:25

## 2021-08-12 RX ADMIN — OXYCODONE HYDROCHLORIDE AND ACETAMINOPHEN 500 MG: 500 TABLET ORAL at 10:22

## 2021-08-12 RX ADMIN — DOXYCYCLINE 100 MG: 100 INJECTION, POWDER, LYOPHILIZED, FOR SOLUTION INTRAVENOUS at 12:25

## 2021-08-12 RX ADMIN — METHYLPREDNISOLONE SODIUM SUCCINATE 40 MG: 40 INJECTION, POWDER, FOR SOLUTION INTRAMUSCULAR; INTRAVENOUS at 10:21

## 2021-08-12 RX ADMIN — ISOSORBIDE MONONITRATE 60 MG: 60 TABLET, EXTENDED RELEASE ORAL at 10:21

## 2021-08-12 RX ADMIN — ZAFIRLUKAST 20 MG: 20 TABLET, COATED ORAL at 21:25

## 2021-08-12 RX ADMIN — CLOPIDOGREL BISULFATE 75 MG: 75 TABLET ORAL at 10:21

## 2021-08-12 RX ADMIN — BUDESONIDE AND FORMOTEROL FUMARATE DIHYDRATE 2 PUFF: 160; 4.5 AEROSOL RESPIRATORY (INHALATION) at 21:33

## 2021-08-12 RX ADMIN — IPRATROPIUM BROMIDE AND ALBUTEROL SULFATE 3 ML: .5; 3 SOLUTION RESPIRATORY (INHALATION) at 13:00

## 2021-08-12 RX ADMIN — IPRATROPIUM BROMIDE AND ALBUTEROL SULFATE 3 ML: .5; 3 SOLUTION RESPIRATORY (INHALATION) at 19:15

## 2021-08-12 RX ADMIN — METHYLPREDNISOLONE SODIUM SUCCINATE 40 MG: 40 INJECTION, POWDER, FOR SOLUTION INTRAMUSCULAR; INTRAVENOUS at 02:28

## 2021-08-12 RX ADMIN — IPRATROPIUM BROMIDE AND ALBUTEROL SULFATE 3 ML: .5; 3 SOLUTION RESPIRATORY (INHALATION) at 07:05

## 2021-08-12 RX ADMIN — SPIRONOLACTONE 25 MG: 25 TABLET, FILM COATED ORAL at 10:21

## 2021-08-12 RX ADMIN — ZAFIRLUKAST 20 MG: 20 TABLET, COATED ORAL at 10:20

## 2021-08-12 RX ADMIN — PANTOPRAZOLE SODIUM 40 MG: 40 TABLET, DELAYED RELEASE ORAL at 21:25

## 2021-08-12 RX ADMIN — BUDESONIDE AND FORMOTEROL FUMARATE DIHYDRATE 2 PUFF: 160; 4.5 AEROSOL RESPIRATORY (INHALATION) at 07:07

## 2021-08-12 RX ADMIN — LISINOPRIL 5 MG: 5 TABLET ORAL at 10:21

## 2021-08-12 RX ADMIN — INSULIN ASPART 4 UNITS: 100 INJECTION, SOLUTION INTRAVENOUS; SUBCUTANEOUS at 18:40

## 2021-08-12 RX ADMIN — ATORVASTATIN CALCIUM 80 MG: 80 TABLET, FILM COATED ORAL at 21:25

## 2021-08-12 RX ADMIN — PREDNISONE 40 MG: 20 TABLET ORAL at 16:09

## 2021-08-12 RX ADMIN — Medication 400 UNITS: at 10:21

## 2021-08-12 RX ADMIN — LEVETIRACETAM 1000 MG: 500 TABLET ORAL at 10:21

## 2021-08-12 RX ADMIN — CETIRIZINE HYDROCHLORIDE 10 MG: 10 TABLET, FILM COATED ORAL at 21:25

## 2021-08-12 RX ADMIN — INSULIN HUMAN 180 UNITS: 500 INJECTION, SOLUTION SUBCUTANEOUS at 20:56

## 2021-08-12 RX ADMIN — Medication 1 CAPSULE: at 21:25

## 2021-08-12 RX ADMIN — SODIUM CHLORIDE, PRESERVATIVE FREE 10 ML: 5 INJECTION INTRAVENOUS at 10:22

## 2021-08-12 RX ADMIN — TIOTROPIUM BROMIDE INHALATION SPRAY 2 PUFF: 3.12 SPRAY, METERED RESPIRATORY (INHALATION) at 07:06

## 2021-08-12 RX ADMIN — ENOXAPARIN SODIUM 40 MG: 40 INJECTION SUBCUTANEOUS at 02:30

## 2021-08-12 RX ADMIN — ENOXAPARIN SODIUM 40 MG: 40 INJECTION SUBCUTANEOUS at 16:09

## 2021-08-12 RX ADMIN — LAMOTRIGINE 100 MG: 100 TABLET ORAL at 10:22

## 2021-08-12 RX ADMIN — GABAPENTIN 600 MG: 300 CAPSULE ORAL at 21:25

## 2021-08-12 NOTE — CASE MANAGEMENT/SOCIAL WORK
Continued Stay Note   Don     Patient Name: Denzel Harding  MRN: [id number]  Today's Date: 8/12/2021    Admit Date: 8/9/2021    Discharge Plan      IMM completed.  Patient said he thinks he will need two more nights in the hospital.    Expected Discharge Date and Time     Expected Discharge Date Expected Discharge Time    Aug 13-14, 2021             Tiffanie Mandujano RN
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
## 2021-08-12 NOTE — PROGRESS NOTES
"8/12/2021    CC: COPD/asthma exacerbation.  Failed outpatient therapy.    S: Patient feels as though his shortness of breath is getting a bit better, but still had difficulty walking to his bathroom yesterday and desaturated into the 70s.  However, noticed that he was able to recover.  States his appetite has returned with use of prednisone.  Denies any significant sleeping difficulty secondary to prednisone.    ROS: Continues to have mild cough that is mostly nonproductive, denies fevers/chills/hemoptysis or chest pain.    O: /82 (BP Location: Left arm, Patient Position: Lying)   Pulse 84   Temp 98.3 °F (36.8 °C) (Oral)   Resp 21   Ht 177.8 cm (70\")   Wt (!) 140 kg (309 lb 1.4 oz)   SpO2 98%   BMI 44.35 kg/m²     Vital signs reviewed.  General/Constitutional: Able to speak in complete sentences, obese   Heent: Extraocular muscles are intact, no eye discharge   neck: No obvious JVD, but difficult to tell given body habitus   cardiovascular: S1 + S2.  Regular rate, well-healed sternotomy scar   lungs/Respiratory: Decreased breath sounds throughout with some scattered wheezing, no crackles appreciated   musculoskeletal/Extremities: Trace edema noted.  Some deconditioning noted  Neurologic: AAOx3.  Following commands  Skin: No rashes appreciated on exposed skin    Labs: Reviewed.   Results from last 7 days   Lab Units 08/12/21  0600 08/10/21  0556 08/09/21  1629   WBC 10*3/mm3 15.11* 12.82* 10.23   HEMOGLOBIN g/dL 11.5* 13.5 13.2   HEMATOCRIT % 36.3* 42.4 40.4   PLATELETS 10*3/mm3 183 202 191       Results from last 7 days   Lab Units 08/12/21  0601 08/10/21  0556 08/09/21  1629   SODIUM mmol/L 134* 133* 137   POTASSIUM mmol/L 5.0 4.5 4.3   CHLORIDE mmol/L 98 99 100   CO2 mmol/L 23.8 23.6 26.9   BUN mg/dL 21 18 17   CREATININE mg/dL 0.80 0.64* 0.83   CALCIUM mg/dL 9.3 9.2 9.2   BILIRUBIN mg/dL  --  0.5 0.4   ALK PHOS U/L  --  87 93   ALT (SGPT) U/L  --  92* 90*   AST (SGOT) U/L  --  64* 63*   GLUCOSE mg/dL " 285* 231* 223*   TOTAL PROTEIN g/dL  --  6.5 6.4   ALBUMIN g/dL  --  3.70 3.70       Results from last 7 days   Lab Units 08/10/21  0556   MAGNESIUM mg/dL 1.9             Lab Results   Component Value Date    PROCALCITO 0.08 08/12/2021    PROCALCITO 0.10 08/09/2021    PROCALCITO 0.09 11/15/2020       Lab Results   Component Value Date    PROBNP 103.8 08/09/2021    PROBNP 14.7 04/29/2021    PROBNP 24.1 01/26/2021       Lab Results   Component Value Date    CRP 0.43 04/11/2017    CRP 7.3 12/04/2015    CRP 7.8 11/27/2015       Lab Results   Component Value Date    SEDRATE 14 02/01/2018    SEDRATE 2 04/12/2017    SEDRATE 4 04/11/2017         Micro: As of August 12, 2021   Lab Results   Component Value Date    RESPCX Rejected 08/10/2021    RESPCX Light growth (2+) Normal Respiratory Ana M 12/19/2017    RESPCX Light growth (2+) Klebsiella pneumoniae (A) 08/31/2017    RESPCX Light growth (2+) Normal Respiratory Ana M 08/31/2017     Lab Results   Component Value Date    BLOODCX No growth at 2 days 08/09/2021    BLOODCX No growth at 2 days 08/09/2021    BLOODCX No growth at 5 days 08/03/2020     Lab Results   Component Value Date    URINECX No growth at 2 days 08/15/2017     Lab Results   Component Value Date    MRSACX  10/13/2020     No Methicillin Resistant Staphylococcus aureus isolated     No results found for: MRSAPCR  No results found for: URCX  No components found for: LOWRESPCF  No results found for: THROATCX  No results found for: CULTURES  No components found for: STREPBCX  No results found for: STREPPNEUAG  No results found for: LEGIONELLA  No results found for: MYCOPLASCX  No results found for: GCCX  Lab Results   Component Value Date    WOUNDCX Scant growth (1+) Staphylococcus epidermidis (A) 09/24/2017     Lab Results   Component Value Date    BODYFLDCX No growth at 4 days 08/25/2017       Lab Results   Component Value Date    FLU Negative 11/04/2019    FLU Negative 11/04/2019       Lab Results   Component  Value Date    ADENOVIRUS Not Detected 08/10/2021     Lab Results   Component Value Date    JI302S Not Detected 08/10/2021     Lab Results   Component Value Date    CVHKU1 Not Detected 08/10/2021     Lab Results   Component Value Date    CVNL63 Not Detected 08/10/2021     Lab Results   Component Value Date    CVOC43 Not Detected 08/10/2021     Lab Results   Component Value Date    HUMETPNEVS Not Detected 08/10/2021     Lab Results   Component Value Date    HURVEV Detected (A) 08/10/2021     Lab Results   Component Value Date    FLUBPCR Not Detected 08/10/2021     Lab Results   Component Value Date    PARAINFLUE Not Detected 08/10/2021     Lab Results   Component Value Date    PARAFLUV2 Not Detected 08/10/2021     Lab Results   Component Value Date    PARAFLUV3 Not Detected 08/10/2021     Lab Results   Component Value Date    PARAFLUV4 Not Detected 08/10/2021     Lab Results   Component Value Date    BPERTPCR Not Detected 08/10/2021     Lab Results   Component Value Date    [id number] Not Detected 08/03/2020     Lab Results   Component Value Date    CPNEUPCR Not Detected 08/10/2021     Lab Results   Component Value Date    MPNEUMO Not Detected 08/10/2021     Lab Results   Component Value Date    FLUAPCR Not Detected 08/10/2021     Lab Results   Component Value Date    FLUAH3 Not Detected 08/03/2020     Lab Results   Component Value Date    FLUAH1 Not Detected 08/03/2020     Lab Results   Component Value Date    RSV Not Detected 08/10/2021     Lab Results   Component Value Date    BPARAPCR Not Detected 08/10/2021       COVID 19:  Lab Results   Component Value Date    COVID19 Not Detected 08/09/2021         ABG: Reviewed  Lab Results   Component Value Date    PHART 7.427 08/09/2021    HSZ1RKI 42.0 08/09/2021    PO2ART 73.8 (L) 08/09/2021    HGBBG 14.5 11/04/2019    L3NNRMSB 93.9 (L) 08/09/2021    FCOHB 0.3 (L) 08/14/2015    CARBOXYHGB <0.7 08/09/2021    FMETHB 0.6 08/14/2015         CXRay: Latest imaging study was  reviewed personally.   Imaging Results (Last 24 Hours)     ** No results found for the last 24 hours. **          Assessment & Recommendations/Plan:   1.  Acute asthma exacerbation  2.  Obstructive sleep apnea  3.  Allergic rhinitis  4.  Morbid obesity    Patient clinically improving and transition to oral steroids today with prednisone 40 mg daily.  He will most likely require prolonged taper and has follow-up appointment October 18 in pulmonary clinic.    Patient advised to continue to ambulate around his room.    Patient advised to take his next Dupixent dose as regularly scheduled next week.    Continue with Symbicort, Spiriva and duo nebs during hospitalization and will resume home inhaler regimen at discharge.  Continue with Zyrtec and Accolate    We have reviewed patient's current orders and changes, if any, have been suggested to primary care team. Plan was also discussed with nursing staff, as necessary.       This document was electronically signed by Carole Arechiga MD on 08/12/21 at 15:36 EDT      Dictated utilizing Dragon dictation.
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
## 2021-08-12 NOTE — PLAN OF CARE
Goal Outcome Evaluation:      Returned to visit at pt's request when he knew his wife and certified emotional support dog would be visiting.  When working with them to complete his Living Will, they had talked about their dog, so this was my opportunity to meet Trent.  I provided them with a copy of the poem by Belkys Chacko, [employer] and [employer] and we enjoyed a conversation about the pt's condition and expected discharge date.  Pt expects to be discharged over the weekend.

## 2021-08-13 LAB
GLUCOSE BLDC GLUCOMTR-MCNC: 204 MG/DL (ref 70–130)
GLUCOSE BLDC GLUCOMTR-MCNC: 249 MG/DL (ref 70–130)
GLUCOSE BLDC GLUCOMTR-MCNC: 274 MG/DL (ref 70–130)
GLUCOSE BLDC GLUCOMTR-MCNC: 286 MG/DL (ref 70–130)

## 2021-08-13 PROCEDURE — 94618 PULMONARY STRESS TESTING: CPT

## 2021-08-13 PROCEDURE — 94799 UNLISTED PULMONARY SVC/PX: CPT

## 2021-08-13 PROCEDURE — 94660 CPAP INITIATION&MGMT: CPT

## 2021-08-13 PROCEDURE — 63710000001 INSULIN ASPART PER 5 UNITS: Performed by: FAMILY MEDICINE

## 2021-08-13 PROCEDURE — 99232 SBSQ HOSP IP/OBS MODERATE 35: CPT | Performed by: INTERNAL MEDICINE

## 2021-08-13 PROCEDURE — 63710000001 PREDNISONE PER 1 MG: Performed by: INTERNAL MEDICINE

## 2021-08-13 PROCEDURE — 25010000002 ENOXAPARIN PER 10 MG: Performed by: FAMILY MEDICINE

## 2021-08-13 PROCEDURE — 82962 GLUCOSE BLOOD TEST: CPT

## 2021-08-13 RX ORDER — PREDNISONE 20 MG/1
40 TABLET ORAL EVERY 12 HOURS SCHEDULED
Status: DISCONTINUED | OUTPATIENT
Start: 2021-08-13 | End: 2021-08-16 | Stop reason: HOSPADM

## 2021-08-13 RX ADMIN — ZAFIRLUKAST 20 MG: 20 TABLET, COATED ORAL at 08:29

## 2021-08-13 RX ADMIN — CETIRIZINE HYDROCHLORIDE 10 MG: 10 TABLET, FILM COATED ORAL at 21:33

## 2021-08-13 RX ADMIN — DOXYCYCLINE 100 MG: 100 CAPSULE ORAL at 21:33

## 2021-08-13 RX ADMIN — IPRATROPIUM BROMIDE AND ALBUTEROL SULFATE 3 ML: .5; 3 SOLUTION RESPIRATORY (INHALATION) at 15:34

## 2021-08-13 RX ADMIN — IPRATROPIUM BROMIDE AND ALBUTEROL SULFATE 3 ML: .5; 3 SOLUTION RESPIRATORY (INHALATION) at 07:01

## 2021-08-13 RX ADMIN — Medication 1 CAPSULE: at 08:28

## 2021-08-13 RX ADMIN — ATORVASTATIN CALCIUM 80 MG: 80 TABLET, FILM COATED ORAL at 21:33

## 2021-08-13 RX ADMIN — ZAFIRLUKAST 20 MG: 20 TABLET, COATED ORAL at 21:32

## 2021-08-13 RX ADMIN — ENOXAPARIN SODIUM 40 MG: 40 INJECTION SUBCUTANEOUS at 02:31

## 2021-08-13 RX ADMIN — GABAPENTIN 600 MG: 300 CAPSULE ORAL at 21:32

## 2021-08-13 RX ADMIN — INSULIN ASPART 3 UNITS: 100 INJECTION, SOLUTION INTRAVENOUS; SUBCUTANEOUS at 12:14

## 2021-08-13 RX ADMIN — IPRATROPIUM BROMIDE AND ALBUTEROL SULFATE 3 ML: .5; 3 SOLUTION RESPIRATORY (INHALATION) at 00:52

## 2021-08-13 RX ADMIN — PREDNISONE 40 MG: 20 TABLET ORAL at 21:32

## 2021-08-13 RX ADMIN — LAMOTRIGINE 100 MG: 100 TABLET ORAL at 21:33

## 2021-08-13 RX ADMIN — IPRATROPIUM BROMIDE AND ALBUTEROL SULFATE 3 ML: .5; 3 SOLUTION RESPIRATORY (INHALATION) at 19:28

## 2021-08-13 RX ADMIN — METOPROLOL SUCCINATE 200 MG: 100 TABLET, EXTENDED RELEASE ORAL at 00:21

## 2021-08-13 RX ADMIN — GABAPENTIN 600 MG: 300 CAPSULE ORAL at 14:49

## 2021-08-13 RX ADMIN — GABAPENTIN 600 MG: 300 CAPSULE ORAL at 06:36

## 2021-08-13 RX ADMIN — DOXYCYCLINE 100 MG: 100 CAPSULE ORAL at 08:28

## 2021-08-13 RX ADMIN — LISINOPRIL 5 MG: 5 TABLET ORAL at 08:28

## 2021-08-13 RX ADMIN — BUDESONIDE AND FORMOTEROL FUMARATE DIHYDRATE 2 PUFF: 160; 4.5 AEROSOL RESPIRATORY (INHALATION) at 19:27

## 2021-08-13 RX ADMIN — SODIUM CHLORIDE, PRESERVATIVE FREE 10 ML: 5 INJECTION INTRAVENOUS at 08:29

## 2021-08-13 RX ADMIN — TIOTROPIUM BROMIDE INHALATION SPRAY 2 PUFF: 3.12 SPRAY, METERED RESPIRATORY (INHALATION) at 07:02

## 2021-08-13 RX ADMIN — INSULIN HUMAN 180 UNITS: 500 INJECTION, SOLUTION SUBCUTANEOUS at 21:13

## 2021-08-13 RX ADMIN — ASPIRIN 325 MG: 325 TABLET, COATED ORAL at 08:28

## 2021-08-13 RX ADMIN — LAMOTRIGINE 100 MG: 100 TABLET ORAL at 08:29

## 2021-08-13 RX ADMIN — Medication 400 UNITS: at 08:28

## 2021-08-13 RX ADMIN — ISOSORBIDE MONONITRATE 60 MG: 60 TABLET, EXTENDED RELEASE ORAL at 08:28

## 2021-08-13 RX ADMIN — Medication 1 CAPSULE: at 21:32

## 2021-08-13 RX ADMIN — INSULIN ASPART 4 UNITS: 100 INJECTION, SOLUTION INTRAVENOUS; SUBCUTANEOUS at 18:19

## 2021-08-13 RX ADMIN — INSULIN HUMAN 180 UNITS: 500 INJECTION, SOLUTION SUBCUTANEOUS at 14:50

## 2021-08-13 RX ADMIN — BUDESONIDE AND FORMOTEROL FUMARATE DIHYDRATE 2 PUFF: 160; 4.5 AEROSOL RESPIRATORY (INHALATION) at 07:02

## 2021-08-13 RX ADMIN — LEVETIRACETAM 1000 MG: 500 TABLET ORAL at 08:28

## 2021-08-13 RX ADMIN — PANTOPRAZOLE SODIUM 40 MG: 40 TABLET, DELAYED RELEASE ORAL at 21:32

## 2021-08-13 RX ADMIN — SPIRONOLACTONE 25 MG: 25 TABLET, FILM COATED ORAL at 08:28

## 2021-08-13 RX ADMIN — INSULIN ASPART 3 UNITS: 100 INJECTION, SOLUTION INTRAVENOUS; SUBCUTANEOUS at 06:36

## 2021-08-13 RX ADMIN — INSULIN HUMAN 190 UNITS: 500 INJECTION, SOLUTION SUBCUTANEOUS at 07:00

## 2021-08-13 RX ADMIN — PREDNISONE 40 MG: 20 TABLET ORAL at 08:27

## 2021-08-13 RX ADMIN — VORTIOXETINE 20 MG: 20 TABLET, FILM COATED ORAL at 08:29

## 2021-08-13 RX ADMIN — METOPROLOL SUCCINATE 200 MG: 100 TABLET, EXTENDED RELEASE ORAL at 21:33

## 2021-08-13 RX ADMIN — SODIUM CHLORIDE, PRESERVATIVE FREE 10 ML: 5 INJECTION INTRAVENOUS at 21:33

## 2021-08-13 RX ADMIN — OXYCODONE HYDROCHLORIDE AND ACETAMINOPHEN 500 MG: 500 TABLET ORAL at 08:28

## 2021-08-13 RX ADMIN — CLOPIDOGREL BISULFATE 75 MG: 75 TABLET ORAL at 08:28

## 2021-08-13 RX ADMIN — ENOXAPARIN SODIUM 40 MG: 40 INJECTION SUBCUTANEOUS at 14:49
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
## 2021-08-13 NOTE — PLAN OF CARE
Goal Outcome Evaluation:  Plan of Care Reviewed With: patient         Juan Jose is pleasant, A+O x 4. He denies pain. He is on 2L NC and reports SOA with activity. He is compliant with plan of care.

## 2021-08-13 NOTE — PROGRESS NOTES
Exercise Oximetry    Patient Name:Denzel Harding   MRN: [id number]   Date: 08/13/21             ROOM AIR BASELINE   SpO2%                  87   Heart Rate            104   Blood Pressure      EXERCISE ON ROOM AIR SpO2% EXERCISE ON O2 @  LPM SpO2%   1 MINUTE  1 MINUTE                    2 90   2 MINUTES  2 MINUTES 91   3 MINUTES  3 MINUTES 90   4 MINUTES  4 MINUTES 92   5 MINUTES  5 MINUTES 90   6 MINUTES  6 MINUTES 92              Distance Walked   Distance Walked                   150   Dyspnea (Loida Scale)   Dyspnea (Loida Scale)           3   Fatigue (Loida Scale)   Fatigue (Loida Scale)            5   SpO2% Post Exercise   SpO2% Post Exercise              94   HR Post Exercise   HR Post Exercise           108   Time to Recovery   Time to Recovery     Comments: pt required 2lpm at rest and while ambulating.

## 2021-08-13 NOTE — PROGRESS NOTES
"  CC: COPD/asthma exacerbation.  Failed outpatient therapy.    S: Feels somewhat better.  Stated that he was finally able to walk to the bathroom although did feel short of breath afterwards.  He also goes on to tell me that he forgot to put his oxygen before and the nurses told him that his oxygen levels went down to low 80s.  He continues to have some cough although feels that it is less productive than it was before. Is now able to walk to the bathroom.     The patient says that his Wife found MOLD in the bedroom ceiling.     ROS: Positive for mild cough, exertional shortness of breath. Denies ongoing chest pain, diarrhea or fever.    O: /77 (BP Location: Left arm, Patient Position: Sitting)   Pulse 101   Temp 98.6 °F (37 °C) (Oral)   Resp 18   Ht 177.8 cm (70\")   Wt (!) 140 kg (309 lb 1.4 oz)   SpO2 97%   BMI 44.35 kg/m²     Vital signs reviewed.  General/Constitutional: Minimal respiratory distress noted.  Neck: No obvious JVD.  Difficult to appreciate due to body habitus.  Cardiovascular: S1 + S2.  Regular.  Sternotomy scar noted.  Lungs/Respiratory: Somewhat decreased air entry bilaterally with no obvious wheezing heard.  Minimal basal crackles noted  Musculoskeletal/Extremities: Trace edema noted.  Neurologic: AAOx3. Was able to follow commands     Labs: Reviewed.   Results from last 7 days   Lab Units 08/12/21  0600 08/10/21  0556 08/09/21  1629   WBC 10*3/mm3 15.11* 12.82* 10.23   HEMOGLOBIN g/dL 11.5* 13.5 13.2   HEMATOCRIT % 36.3* 42.4 40.4   PLATELETS 10*3/mm3 183 202 191       Results from last 7 days   Lab Units 08/12/21  0601 08/10/21  0556 08/09/21  1629   SODIUM mmol/L 134* 133* 137   POTASSIUM mmol/L 5.0 4.5 4.3   CHLORIDE mmol/L 98 99 100   CO2 mmol/L 23.8 23.6 26.9   BUN mg/dL 21 18 17   CREATININE mg/dL 0.80 0.64* 0.83   CALCIUM mg/dL 9.3 9.2 9.2   BILIRUBIN mg/dL  --  0.5 0.4   ALK PHOS U/L  --  87 93   ALT (SGPT) U/L  --  92* 90*   AST (SGOT) U/L  --  64* 63*   GLUCOSE mg/dL 285* " 231* 223*   TOTAL PROTEIN g/dL  --  6.5 6.4   ALBUMIN g/dL  --  3.70 3.70       Results from last 7 days   Lab Units 08/10/21  0556   MAGNESIUM mg/dL 1.9             Lab Results   Component Value Date    PROCALCITO 0.08 08/12/2021    PROCALCITO 0.10 08/09/2021    PROCALCITO 0.09 11/15/2020       Lab Results   Component Value Date    PROBNP 103.8 08/09/2021    PROBNP 14.7 04/29/2021    PROBNP 24.1 01/26/2021       Lab Results   Component Value Date    CRP 0.43 04/11/2017    CRP 7.3 12/04/2015    CRP 7.8 11/27/2015       Lab Results   Component Value Date    SEDRATE 14 02/01/2018    SEDRATE 2 04/12/2017    SEDRATE 4 04/11/2017         Micro: As of August 13, 2021   Lab Results   Component Value Date    RESPCX Rejected 08/10/2021    RESPCX Light growth (2+) Normal Respiratory Ana M 12/19/2017    RESPCX Light growth (2+) Klebsiella pneumoniae (A) 08/31/2017    RESPCX Light growth (2+) Normal Respiratory Ana M 08/31/2017     Lab Results   Component Value Date    BLOODCX No growth at 3 days 08/09/2021    BLOODCX No growth at 3 days 08/09/2021    BLOODCX No growth at 5 days 08/03/2020     Lab Results   Component Value Date    URINECX No growth at 2 days 08/15/2017     Lab Results   Component Value Date    MRSACX  10/13/2020     No Methicillin Resistant Staphylococcus aureus isolated     No results found for: MRSAPCR  No results found for: URCX  No components found for: LOWRESPCF  No results found for: THROATCX  No results found for: CULTURES  No components found for: STREPBCX  No results found for: STREPPNEUAG  No results found for: LEGIONELLA  No results found for: MYCOPLASCX  No results found for: GCCX  Lab Results   Component Value Date    WOUNDCX Scant growth (1+) Staphylococcus epidermidis (A) 09/24/2017     Lab Results   Component Value Date    BODYFLDCX No growth at 4 days 08/25/2017       Lab Results   Component Value Date    FLU Negative 11/04/2019    FLU Negative 11/04/2019       Lab Results   Component Value  Date    ADENOVIRUS Not Detected 08/10/2021     Lab Results   Component Value Date    RU809U Not Detected 08/10/2021     Lab Results   Component Value Date    CVHKU1 Not Detected 08/10/2021     Lab Results   Component Value Date    CVNL63 Not Detected 08/10/2021     Lab Results   Component Value Date    CVOC43 Not Detected 08/10/2021     Lab Results   Component Value Date    HUMETPNEVS Not Detected 08/10/2021     Lab Results   Component Value Date    HURVEV Detected (A) 08/10/2021     Lab Results   Component Value Date    FLUBPCR Not Detected 08/10/2021     Lab Results   Component Value Date    PARAINFLUE Not Detected 08/10/2021     Lab Results   Component Value Date    PARAFLUV2 Not Detected 08/10/2021     Lab Results   Component Value Date    PARAFLUV3 Not Detected 08/10/2021     Lab Results   Component Value Date    PARAFLUV4 Not Detected 08/10/2021     Lab Results   Component Value Date    BPERTPCR Not Detected 08/10/2021     Lab Results   Component Value Date    KYFNU14269 Not Detected 08/03/2020     Lab Results   Component Value Date    CPNEUPCR Not Detected 08/10/2021     Lab Results   Component Value Date    MPNEUMO Not Detected 08/10/2021     Lab Results   Component Value Date    FLUAPCR Not Detected 08/10/2021     Lab Results   Component Value Date    FLUAH3 Not Detected 08/03/2020     Lab Results   Component Value Date    FLUAH1 Not Detected 08/03/2020     Lab Results   Component Value Date    RSV Not Detected 08/10/2021     Lab Results   Component Value Date    BPARAPCR Not Detected 08/10/2021       COVID 19:  Lab Results   Component Value Date    COVID19 Not Detected 08/09/2021         ABG: Reviewed  Lab Results   Component Value Date    PHART 7.427 08/09/2021    MIW1AVZ 42.0 08/09/2021    PO2ART 73.8 (L) 08/09/2021    HGBBG 14.5 11/04/2019    I1JWLNLV 93.9 (L) 08/09/2021    FCOHB 0.3 (L) 08/14/2015    CARBOXYHGB <0.7 08/09/2021    FMETHB 0.6 08/14/2015         CXRay: Latest imaging study was reviewed  personally.   Imaging Results (Last 24 Hours)     ** No results found for the last 24 hours. **          Assessment & Recommendations/Plan:   1.  Acute asthma exacerbation  2.  Obstructive sleep apnea  3.  Allergic rhinitis  4.  Morbid obesity  5.  Chest pain/coronary artery disease.    Failed outpatient treatment of asthma although multiple rounds of steroids were attempted.  ?  Possible hypersensitive pneumonitis from exposure to mold?  Will change Prednisone to 40 mg BID for 5 days and he will need to have this tapered slowly.  After 5 days of 40 mg twice a day have been completed, I would suggest 30 mg twice a day for 5 days, 40 mg once a day for 5 days, 30 mg once a day for 5 days, 20 mg once a day for 5 days and then 10 mg once a day for 10 days.  Continue nebulized treatments   Patient is to continue using his BiPAP device.  Chest pain could be secondary to coronary spasm?  Although his troponins and EKG were unremarkable at the time of chest pain.  He does have coronary artery disease and is status post CABG.    We have reviewed patient's current orders and changes, if any, have been suggested to primary care team. Plan was also discussed with nursing staff, as necessary.     We have updated the admitting attending and nursing staff, as appropriate, on the patient's current status and plan. I will be going off shift tonight and will be unavailable. Please contact oncall pulmonologist, if available.      This document was electronically signed by Linda Barajas MD on 08/13/21 at 13:17 EDT      Dictated utilizing Dragon dictation.

## 2021-08-13 NOTE — CASE MANAGEMENT/SOCIAL WORK
Continued Stay Note   [facility]     Patient Name: Denzel Harding  MRN: [id number]  Today's Date: 8/13/2021    Admit Date: 8/9/2021    Discharge Plan    IMM completed with patient again today, as he told me he is a probable discharge on Sunday.  Walking oximetry done today shows patient requires oxygen at 2L cont. I sent an order for oxygen to [facility]-  His DME supplier for his Bipap, he wanted them to provide his oxygen as well. They are taking portable oxygen to the patients wife so she can bring it when she comes to get him from the hospital. They said they will set up home oxygen sometime this weekend.  No other CM needs identified.       Expected Discharge Date and Time     Expected Discharge Date Expected Discharge Time    Aug 15, 2021             Tiffanie Mandujano RN

## 2021-08-14 LAB
ANION GAP SERPL CALCULATED.3IONS-SCNC: 10.6 MMOL/L (ref 5–15)
BACTERIA SPEC AEROBE CULT: NORMAL
BACTERIA SPEC AEROBE CULT: NORMAL
BUN SERPL-MCNC: 16 MG/DL (ref 8–23)
BUN/CREAT SERPL: 25.4 (ref 7–25)
CALCIUM SPEC-SCNC: 9.2 MG/DL (ref 8.6–10.5)
CHLORIDE SERPL-SCNC: 102 MMOL/L (ref 98–107)
CO2 SERPL-SCNC: 25.4 MMOL/L (ref 22–29)
CREAT SERPL-MCNC: 0.63 MG/DL (ref 0.76–1.27)
DEPRECATED RDW RBC AUTO: 47.6 FL (ref 37–54)
ERYTHROCYTE [DISTWIDTH] IN BLOOD BY AUTOMATED COUNT: 17 % (ref 12.3–15.4)
GFR SERPL CREATININE-BSD FRML MDRD: 130 ML/MIN/1.73
GLUCOSE BLDC GLUCOMTR-MCNC: 102 MG/DL (ref 70–130)
GLUCOSE BLDC GLUCOMTR-MCNC: 165 MG/DL (ref 70–130)
GLUCOSE BLDC GLUCOMTR-MCNC: 199 MG/DL (ref 70–130)
GLUCOSE BLDC GLUCOMTR-MCNC: 213 MG/DL (ref 70–130)
GLUCOSE SERPL-MCNC: 179 MG/DL (ref 65–99)
HCT VFR BLD AUTO: 39.1 % (ref 37.5–51)
HGB BLD-MCNC: 12.3 G/DL (ref 13–17.7)
MCH RBC QN AUTO: 24.4 PG (ref 26.6–33)
MCHC RBC AUTO-ENTMCNC: 31.5 G/DL (ref 31.5–35.7)
MCV RBC AUTO: 77.6 FL (ref 79–97)
PLATELET # BLD AUTO: 164 10*3/MM3 (ref 140–450)
PMV BLD AUTO: 10 FL (ref 6–12)
POTASSIUM SERPL-SCNC: 4.2 MMOL/L (ref 3.5–5.2)
RBC # BLD AUTO: 5.04 10*6/MM3 (ref 4.14–5.8)
SODIUM SERPL-SCNC: 138 MMOL/L (ref 136–145)
WBC # BLD AUTO: 9.5 10*3/MM3 (ref 3.4–10.8)

## 2021-08-14 PROCEDURE — 94760 N-INVAS EAR/PLS OXIMETRY 1: CPT

## 2021-08-14 PROCEDURE — 94799 UNLISTED PULMONARY SVC/PX: CPT

## 2021-08-14 PROCEDURE — 82962 GLUCOSE BLOOD TEST: CPT

## 2021-08-14 PROCEDURE — 25010000002 ENOXAPARIN PER 10 MG: Performed by: FAMILY MEDICINE

## 2021-08-14 PROCEDURE — 85027 COMPLETE CBC AUTOMATED: CPT | Performed by: INTERNAL MEDICINE

## 2021-08-14 PROCEDURE — 63710000001 PREDNISONE PER 1 MG: Performed by: INTERNAL MEDICINE

## 2021-08-14 PROCEDURE — 99233 SBSQ HOSP IP/OBS HIGH 50: CPT | Performed by: INTERNAL MEDICINE

## 2021-08-14 PROCEDURE — 80048 BASIC METABOLIC PNL TOTAL CA: CPT | Performed by: INTERNAL MEDICINE

## 2021-08-14 PROCEDURE — 63710000001 INSULIN ASPART PER 5 UNITS: Performed by: FAMILY MEDICINE

## 2021-08-14 RX ADMIN — LISINOPRIL 5 MG: 5 TABLET ORAL at 09:49

## 2021-08-14 RX ADMIN — Medication 1 CAPSULE: at 21:40

## 2021-08-14 RX ADMIN — ZAFIRLUKAST 20 MG: 20 TABLET, COATED ORAL at 09:50

## 2021-08-14 RX ADMIN — BUDESONIDE AND FORMOTEROL FUMARATE DIHYDRATE 2 PUFF: 160; 4.5 AEROSOL RESPIRATORY (INHALATION) at 07:06

## 2021-08-14 RX ADMIN — PREDNISONE 40 MG: 20 TABLET ORAL at 21:39

## 2021-08-14 RX ADMIN — SPIRONOLACTONE 25 MG: 25 TABLET, FILM COATED ORAL at 09:49

## 2021-08-14 RX ADMIN — GABAPENTIN 600 MG: 300 CAPSULE ORAL at 15:22

## 2021-08-14 RX ADMIN — INSULIN HUMAN 180 UNITS: 500 INJECTION, SOLUTION SUBCUTANEOUS at 15:24

## 2021-08-14 RX ADMIN — IPRATROPIUM BROMIDE AND ALBUTEROL SULFATE 3 ML: .5; 3 SOLUTION RESPIRATORY (INHALATION) at 07:06

## 2021-08-14 RX ADMIN — CETIRIZINE HYDROCHLORIDE 10 MG: 10 TABLET, FILM COATED ORAL at 21:40

## 2021-08-14 RX ADMIN — BUDESONIDE AND FORMOTEROL FUMARATE DIHYDRATE 2 PUFF: 160; 4.5 AEROSOL RESPIRATORY (INHALATION) at 19:05

## 2021-08-14 RX ADMIN — IPRATROPIUM BROMIDE AND ALBUTEROL SULFATE 3 ML: .5; 3 SOLUTION RESPIRATORY (INHALATION) at 13:13

## 2021-08-14 RX ADMIN — SODIUM CHLORIDE, PRESERVATIVE FREE 10 ML: 5 INJECTION INTRAVENOUS at 09:50

## 2021-08-14 RX ADMIN — CLOPIDOGREL BISULFATE 75 MG: 75 TABLET ORAL at 09:54

## 2021-08-14 RX ADMIN — ATORVASTATIN CALCIUM 80 MG: 80 TABLET, FILM COATED ORAL at 21:39

## 2021-08-14 RX ADMIN — IPRATROPIUM BROMIDE AND ALBUTEROL SULFATE 3 ML: .5; 3 SOLUTION RESPIRATORY (INHALATION) at 01:11

## 2021-08-14 RX ADMIN — SODIUM CHLORIDE, PRESERVATIVE FREE 10 ML: 5 INJECTION INTRAVENOUS at 21:40

## 2021-08-14 RX ADMIN — INSULIN ASPART 2 UNITS: 100 INJECTION, SOLUTION INTRAVENOUS; SUBCUTANEOUS at 06:32

## 2021-08-14 RX ADMIN — PREDNISONE 40 MG: 20 TABLET ORAL at 09:49

## 2021-08-14 RX ADMIN — Medication 1 CAPSULE: at 09:49

## 2021-08-14 RX ADMIN — ENOXAPARIN SODIUM 40 MG: 40 INJECTION SUBCUTANEOUS at 15:23

## 2021-08-14 RX ADMIN — OXYCODONE HYDROCHLORIDE AND ACETAMINOPHEN 500 MG: 500 TABLET ORAL at 09:49

## 2021-08-14 RX ADMIN — Medication 400 UNITS: at 09:49

## 2021-08-14 RX ADMIN — INSULIN HUMAN 180 UNITS: 500 INJECTION, SOLUTION SUBCUTANEOUS at 21:00

## 2021-08-14 RX ADMIN — TIOTROPIUM BROMIDE INHALATION SPRAY 2 PUFF: 3.12 SPRAY, METERED RESPIRATORY (INHALATION) at 07:06

## 2021-08-14 RX ADMIN — ZAFIRLUKAST 20 MG: 20 TABLET, COATED ORAL at 21:40

## 2021-08-14 RX ADMIN — IPRATROPIUM BROMIDE AND ALBUTEROL SULFATE 3 ML: .5; 3 SOLUTION RESPIRATORY (INHALATION) at 19:05

## 2021-08-14 RX ADMIN — ISOSORBIDE MONONITRATE 60 MG: 60 TABLET, EXTENDED RELEASE ORAL at 09:49

## 2021-08-14 RX ADMIN — ASPIRIN 325 MG: 325 TABLET, COATED ORAL at 09:49

## 2021-08-14 RX ADMIN — LAMOTRIGINE 100 MG: 100 TABLET ORAL at 21:39

## 2021-08-14 RX ADMIN — LEVETIRACETAM 1000 MG: 500 TABLET ORAL at 09:49

## 2021-08-14 RX ADMIN — ENOXAPARIN SODIUM 40 MG: 40 INJECTION SUBCUTANEOUS at 03:52

## 2021-08-14 RX ADMIN — GABAPENTIN 600 MG: 300 CAPSULE ORAL at 23:06

## 2021-08-14 RX ADMIN — METOPROLOL SUCCINATE 200 MG: 100 TABLET, EXTENDED RELEASE ORAL at 23:06

## 2021-08-14 RX ADMIN — INSULIN ASPART 3 UNITS: 100 INJECTION, SOLUTION INTRAVENOUS; SUBCUTANEOUS at 16:44

## 2021-08-14 RX ADMIN — DOXYCYCLINE 100 MG: 100 CAPSULE ORAL at 21:40

## 2021-08-14 RX ADMIN — LAMOTRIGINE 100 MG: 100 TABLET ORAL at 09:49

## 2021-08-14 RX ADMIN — GABAPENTIN 600 MG: 300 CAPSULE ORAL at 06:29

## 2021-08-14 RX ADMIN — INSULIN HUMAN 190 UNITS: 500 INJECTION, SOLUTION SUBCUTANEOUS at 06:31

## 2021-08-14 RX ADMIN — DOXYCYCLINE 100 MG: 100 CAPSULE ORAL at 09:49

## 2021-08-14 RX ADMIN — VORTIOXETINE 20 MG: 20 TABLET, FILM COATED ORAL at 09:50

## 2021-08-14 NOTE — PLAN OF CARE
"Goal Outcome Evaluation:      Patient states he still feels \"smothered\" at times, but is maintaining O2 saturation between 94-97 percent on 2L NC. States he is feeling much better at this time.            "

## 2021-08-14 NOTE — PLAN OF CARE
"Goal Outcome Evaluation:  Plan of Care Reviewed With: patient        Progress: no change  Outcome Summary: VSS, maintaining o2 sats >90% on 2L NC, pt verbalized breathing \"better\", no acute events this shift  "

## 2021-08-14 NOTE — PROGRESS NOTES
[facility]    Name:  Denzel Harding   Age:  61 y.o.  Sex:  male  :  1960  MRN:  [id number]   Visit Number:  [id number]  Admission Date:  2021  Date Of Service:  21  Primary Care Physician:  Isaura Godoy MD     LOS: 5 days :  Patient Care Team:  Isaura Godoy MD as PCP - General (Internal Medicine)  London Jiménez III, MD as Cardiologist (Cardiology)  Blaine Waters MD as Consulting Physician (Endocrinology)  Torres Kim MD as Consulting Physician (General Surgery):    Chief Complaint:      Cough and shortness of breath    Subjective / Interval History:     Slow gradual improvement with his cough and shortness of breath is on a steroid taper on oxygen supplement maintaining a good O2 sat at home.  He denies chest pain or shortness of breath has been ambulating without much difficulty    Review of Systems:   Review of Systems   Constitutional: Positive for fatigue. Negative for activity change, appetite change and fever.   HENT: Negative for congestion, ear discharge, ear pain and trouble swallowing.    Eyes: Negative for photophobia and visual disturbance.   Respiratory: Positive for cough and shortness of breath.    Cardiovascular: Negative for chest pain and palpitations.   Gastrointestinal: Negative for abdominal distention, abdominal pain, constipation, diarrhea, nausea and vomiting.   Endocrine: Negative.    Genitourinary: Negative for dysuria, hematuria and urgency.   Musculoskeletal: Positive for arthralgias. Negative for back pain, joint swelling and myalgias.   Skin: Negative for color change and rash.   Allergic/Immunologic: Negative.    Neurological: Negative for dizziness, weakness, light-headedness and headaches.   Hematological: Negative for adenopathy. Does not bruise/bleed easily.   Psychiatric/Behavioral: Positive for sleep disturbance. Negative for agitation, confusion and dysphoric mood. The patient is not  nervous/anxious.          Vital Signs:    Temp:  [98.4 °F (36.9 °C)-98.8 °F (37.1 °C)] 98.4 °F (36.9 °C)  Heart Rate:  [] 86  Resp:  [17-18] 17  BP: (140-166)/(70-77) 155/75    Intake and output:      Intake/Output Summary (Last 24 hours) at 8/14/2021 0929  Last data filed at 8/13/2021 2300  Gross per 24 hour   Intake 840 ml   Output --   Net 840 ml     No intake/output data recorded.    Physical Examination:  Physical Exam  Constitutional:       General: He is not in acute distress.     Appearance: He is well-developed.   HENT:      Nose: Nose normal.   Eyes:      General: No scleral icterus.     Conjunctiva/sclera: Conjunctivae normal.   Neck:      Thyroid: No thyromegaly.      Trachea: No tracheal deviation.   Cardiovascular:      Rate and Rhythm: Normal rate and regular rhythm.      Heart sounds: No murmur heard.   No friction rub.   Pulmonary:      Effort: No respiratory distress.      Breath sounds: No wheezing or rales.   Abdominal:      General: There is no distension.      Palpations: Abdomen is soft. There is no mass.      Tenderness: There is no abdominal tenderness. There is no guarding.   Musculoskeletal:         General: Deformity present. Normal range of motion.      Left lower leg: Edema present.   Lymphadenopathy:      Cervical: No cervical adenopathy.   Skin:     General: Skin is warm and dry.      Findings: No erythema or rash.   Neurological:      Mental Status: He is alert and oriented to person, place, and time.      Cranial Nerves: No cranial nerve deficit.      Coordination: Coordination normal.      Deep Tendon Reflexes: Reflexes are normal and symmetric.   Psychiatric:         Behavior: Behavior normal.         Thought Content: Thought content normal.         Judgment: Judgment normal.           Laboratory results:  Results from last 7 days   Lab Units 08/14/21  0612 08/12/21  0601 08/10/21  0556 08/09/21  1629 08/09/21  1629   SODIUM mmol/L 138 134* 133*   < > 137   POTASSIUM mmol/L  4.2 5.0 4.5   < > 4.3   CHLORIDE mmol/L 102 98 99   < > 100   CO2 mmol/L 25.4 23.8 23.6   < > 26.9   BUN mg/dL 16 21 18   < > 17   CREATININE mg/dL 0.63* 0.80 0.64*   < > 0.83   CALCIUM mg/dL 9.2 9.3 9.2   < > 9.2   ALBUMIN g/dL  --   --  3.70  --  3.70   BILIRUBIN mg/dL  --   --  0.5  --  0.4   ALK PHOS U/L  --   --  87  --  93   ALT (SGPT) U/L  --   --  92*  --  90*   AST (SGOT) U/L  --   --  64*  --  63*   GLUCOSE mg/dL 179* 285* 231*   < > 223*    < > = values in this interval not displayed.     Estimated Creatinine Clearance: 176 mL/min (A) (by C-G formula based on SCr of 0.63 mg/dL (L)).  Results from last 7 days   Lab Units 08/10/21  0556   MAGNESIUM mg/dL 1.9         Results from last 7 days   Lab Units 08/14/21  0612 08/12/21  0600 08/10/21  0556 08/09/21  1629   WBC 10*3/mm3 9.50 15.11* 12.82* 10.23   HEMOGLOBIN g/dL 12.3* 11.5* 13.5 13.2   PLATELETS 10*3/mm3 164 183 202 191         Brief Urine Lab Results  (Last result in the past 365 days)      Color   Clarity   Blood   Leuk Est   Nitrite   Protein   CREAT   Urine HCG        10/13/20 1324 Yellow Clear Negative Negative Negative Trace                 I have reviewed the patient's laboratory results.    Radiology results:    Imaging Results (Last 24 Hours)     ** No results found for the last 24 hours. **          I have reviewed the patient's radiology reports.    Medication Review:     I have reviewed the patients active and prn medications.       Assessment & Plan:    Severe persistent asthma with acute exacerbation stable is on Dupixent at home.  Continue with neb treatments oxygen supplement.  Is on a prednisone taper    EDD treated with BiPAP compliant with CPAP use    Chronic diastolic CHF (congestive heart failure) (CMS/[facility]) stable without evidence of fluid overload    Type 2 diabetes mellitus with retinopathy, with long-term current use of insulin (CMS/[facility]) has poor control with an A1c of 9.9 continue with Accu-Cheks and a sliding scale    Acute  on chronic respiratory failure with hypoxia (CMS/[facility])    Morbid obesity with BMI of 40.0-44.9, adult (CMS/[facility]) has had counseling about dietary restrictions and weight loss    COPD with acute exacerbation (CMS/[facility])    Insulin resistance    Abdominal discomfort          Blu Edmonds MD  08/14/21  09:29 EDT

## 2021-08-15 LAB
GLUCOSE BLDC GLUCOMTR-MCNC: 123 MG/DL (ref 70–130)
GLUCOSE BLDC GLUCOMTR-MCNC: 191 MG/DL (ref 70–130)
GLUCOSE BLDC GLUCOMTR-MCNC: 220 MG/DL (ref 70–130)
GLUCOSE BLDC GLUCOMTR-MCNC: 254 MG/DL (ref 70–130)
GLUCOSE BLDC GLUCOMTR-MCNC: 63 MG/DL (ref 70–130)
GLUCOSE BLDC GLUCOMTR-MCNC: 69 MG/DL (ref 70–130)

## 2021-08-15 PROCEDURE — 99232 SBSQ HOSP IP/OBS MODERATE 35: CPT | Performed by: INTERNAL MEDICINE

## 2021-08-15 PROCEDURE — 94799 UNLISTED PULMONARY SVC/PX: CPT

## 2021-08-15 PROCEDURE — 63710000001 PREDNISONE PER 1 MG: Performed by: INTERNAL MEDICINE

## 2021-08-15 PROCEDURE — 82962 GLUCOSE BLOOD TEST: CPT

## 2021-08-15 PROCEDURE — 25010000002 ENOXAPARIN PER 10 MG: Performed by: FAMILY MEDICINE

## 2021-08-15 PROCEDURE — 94760 N-INVAS EAR/PLS OXIMETRY 1: CPT

## 2021-08-15 PROCEDURE — 63710000001 INSULIN ASPART PER 5 UNITS: Performed by: FAMILY MEDICINE

## 2021-08-15 RX ADMIN — IPRATROPIUM BROMIDE AND ALBUTEROL SULFATE 3 ML: .5; 3 SOLUTION RESPIRATORY (INHALATION) at 13:16

## 2021-08-15 RX ADMIN — ISOSORBIDE MONONITRATE 60 MG: 60 TABLET, EXTENDED RELEASE ORAL at 08:18

## 2021-08-15 RX ADMIN — PANTOPRAZOLE SODIUM 40 MG: 40 TABLET, DELAYED RELEASE ORAL at 00:03

## 2021-08-15 RX ADMIN — IPRATROPIUM BROMIDE AND ALBUTEROL SULFATE 3 ML: .5; 3 SOLUTION RESPIRATORY (INHALATION) at 06:53

## 2021-08-15 RX ADMIN — DOXYCYCLINE 100 MG: 100 CAPSULE ORAL at 08:18

## 2021-08-15 RX ADMIN — IPRATROPIUM BROMIDE AND ALBUTEROL SULFATE 3 ML: .5; 3 SOLUTION RESPIRATORY (INHALATION) at 01:18

## 2021-08-15 RX ADMIN — IPRATROPIUM BROMIDE AND ALBUTEROL SULFATE 3 ML: .5; 3 SOLUTION RESPIRATORY (INHALATION) at 19:16

## 2021-08-15 RX ADMIN — NYSTATIN 500000 UNITS: 100000 SUSPENSION ORAL at 18:19

## 2021-08-15 RX ADMIN — INSULIN HUMAN 180 UNITS: 500 INJECTION, SOLUTION SUBCUTANEOUS at 15:08

## 2021-08-15 RX ADMIN — ATORVASTATIN CALCIUM 80 MG: 80 TABLET, FILM COATED ORAL at 20:41

## 2021-08-15 RX ADMIN — GABAPENTIN 600 MG: 300 CAPSULE ORAL at 06:48

## 2021-08-15 RX ADMIN — PREDNISONE 40 MG: 20 TABLET ORAL at 08:18

## 2021-08-15 RX ADMIN — ASPIRIN 325 MG: 325 TABLET, COATED ORAL at 08:18

## 2021-08-15 RX ADMIN — BUDESONIDE AND FORMOTEROL FUMARATE DIHYDRATE 2 PUFF: 160; 4.5 AEROSOL RESPIRATORY (INHALATION) at 06:53

## 2021-08-15 RX ADMIN — CETIRIZINE HYDROCHLORIDE 10 MG: 10 TABLET, FILM COATED ORAL at 20:41

## 2021-08-15 RX ADMIN — SPIRONOLACTONE 25 MG: 25 TABLET, FILM COATED ORAL at 08:18

## 2021-08-15 RX ADMIN — LEVETIRACETAM 1000 MG: 500 TABLET ORAL at 08:18

## 2021-08-15 RX ADMIN — ENOXAPARIN SODIUM 40 MG: 40 INJECTION SUBCUTANEOUS at 14:40

## 2021-08-15 RX ADMIN — SODIUM CHLORIDE, PRESERVATIVE FREE 10 ML: 5 INJECTION INTRAVENOUS at 08:18

## 2021-08-15 RX ADMIN — ZAFIRLUKAST 20 MG: 20 TABLET, COATED ORAL at 08:23

## 2021-08-15 RX ADMIN — BUDESONIDE AND FORMOTEROL FUMARATE DIHYDRATE 2 PUFF: 160; 4.5 AEROSOL RESPIRATORY (INHALATION) at 19:16

## 2021-08-15 RX ADMIN — NYSTATIN 500000 UNITS: 100000 SUSPENSION ORAL at 20:41

## 2021-08-15 RX ADMIN — PANTOPRAZOLE SODIUM 40 MG: 40 TABLET, DELAYED RELEASE ORAL at 20:55

## 2021-08-15 RX ADMIN — SODIUM CHLORIDE, PRESERVATIVE FREE 10 ML: 5 INJECTION INTRAVENOUS at 20:55

## 2021-08-15 RX ADMIN — VORTIOXETINE 20 MG: 20 TABLET, FILM COATED ORAL at 08:23

## 2021-08-15 RX ADMIN — Medication 400 UNITS: at 08:18

## 2021-08-15 RX ADMIN — Medication 1 CAPSULE: at 08:18

## 2021-08-15 RX ADMIN — METOPROLOL SUCCINATE 200 MG: 100 TABLET, EXTENDED RELEASE ORAL at 22:32

## 2021-08-15 RX ADMIN — LISINOPRIL 5 MG: 5 TABLET ORAL at 08:18

## 2021-08-15 RX ADMIN — OXYCODONE HYDROCHLORIDE AND ACETAMINOPHEN 500 MG: 500 TABLET ORAL at 08:18

## 2021-08-15 RX ADMIN — ZAFIRLUKAST 20 MG: 20 TABLET, COATED ORAL at 20:41

## 2021-08-15 RX ADMIN — GABAPENTIN 600 MG: 300 CAPSULE ORAL at 14:40

## 2021-08-15 RX ADMIN — INSULIN HUMAN 190 UNITS: 500 INJECTION, SOLUTION SUBCUTANEOUS at 06:49

## 2021-08-15 RX ADMIN — ENOXAPARIN SODIUM 40 MG: 40 INJECTION SUBCUTANEOUS at 04:19

## 2021-08-15 RX ADMIN — NYSTATIN 500000 UNITS: 100000 SUSPENSION ORAL at 14:42

## 2021-08-15 RX ADMIN — GABAPENTIN 600 MG: 300 CAPSULE ORAL at 22:32

## 2021-08-15 RX ADMIN — INSULIN HUMAN 180 UNITS: 500 INJECTION, SOLUTION SUBCUTANEOUS at 20:43

## 2021-08-15 RX ADMIN — Medication 1 CAPSULE: at 20:41

## 2021-08-15 RX ADMIN — TIOTROPIUM BROMIDE INHALATION SPRAY 2 PUFF: 3.12 SPRAY, METERED RESPIRATORY (INHALATION) at 06:54

## 2021-08-15 RX ADMIN — CLOPIDOGREL BISULFATE 75 MG: 75 TABLET ORAL at 08:18

## 2021-08-15 RX ADMIN — PREDNISONE 40 MG: 20 TABLET ORAL at 20:41

## 2021-08-15 RX ADMIN — LAMOTRIGINE 100 MG: 100 TABLET ORAL at 08:18

## 2021-08-15 RX ADMIN — INSULIN ASPART 4 UNITS: 100 INJECTION, SOLUTION INTRAVENOUS; SUBCUTANEOUS at 11:32

## 2021-08-15 RX ADMIN — LAMOTRIGINE 100 MG: 100 TABLET ORAL at 20:41
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
## 2021-08-15 NOTE — PLAN OF CARE
Goal Outcome Evaluation:      Patient complaint of soreness in oral mucosa. Some wheezing in bilateral lungs upon ascultation. Possible D/C tomorrow.

## 2021-08-16 ENCOUNTER — READMISSION MANAGEMENT (OUTPATIENT)
Dept: CALL CENTER | Facility: HOSPITAL | Age: 61
End: 2021-08-16

## 2021-08-16 VITALS
DIASTOLIC BLOOD PRESSURE: 72 MMHG | BODY MASS INDEX: 44.25 KG/M2 | HEART RATE: 80 BPM | WEIGHT: 309.08 LBS | SYSTOLIC BLOOD PRESSURE: 164 MMHG | HEIGHT: 70 IN | OXYGEN SATURATION: 99 % | TEMPERATURE: 98.4 F | RESPIRATION RATE: 20 BRPM

## 2021-08-16 LAB
GLUCOSE BLDC GLUCOMTR-MCNC: 202 MG/DL (ref 70–130)
GLUCOSE BLDC GLUCOMTR-MCNC: 231 MG/DL (ref 70–130)

## 2021-08-16 PROCEDURE — 63710000001 INSULIN ASPART PER 5 UNITS: Performed by: FAMILY MEDICINE

## 2021-08-16 PROCEDURE — 25010000002 ENOXAPARIN PER 10 MG: Performed by: FAMILY MEDICINE

## 2021-08-16 PROCEDURE — 94618 PULMONARY STRESS TESTING: CPT

## 2021-08-16 PROCEDURE — 94799 UNLISTED PULMONARY SVC/PX: CPT

## 2021-08-16 PROCEDURE — 99239 HOSP IP/OBS DSCHRG MGMT >30: CPT | Performed by: INTERNAL MEDICINE

## 2021-08-16 PROCEDURE — 63710000001 PREDNISONE PER 1 MG: Performed by: INTERNAL MEDICINE

## 2021-08-16 PROCEDURE — 82962 GLUCOSE BLOOD TEST: CPT

## 2021-08-16 RX ORDER — PREDNISONE 10 MG/1
TABLET ORAL
Qty: 31 TABLET | Refills: 0 | Status: SHIPPED | OUTPATIENT
Start: 2021-08-16 | End: 2021-12-13

## 2021-08-16 RX ORDER — ISOSORBIDE MONONITRATE 60 MG/1
60 TABLET, EXTENDED RELEASE ORAL
Qty: 30 TABLET | Refills: 0 | Status: SHIPPED | OUTPATIENT
Start: 2021-08-17 | End: 2021-11-01 | Stop reason: SDUPTHER

## 2021-08-16 RX ADMIN — ISOSORBIDE MONONITRATE 60 MG: 60 TABLET, EXTENDED RELEASE ORAL at 08:57

## 2021-08-16 RX ADMIN — LAMOTRIGINE 100 MG: 100 TABLET ORAL at 08:57

## 2021-08-16 RX ADMIN — IPRATROPIUM BROMIDE AND ALBUTEROL SULFATE 3 ML: .5; 3 SOLUTION RESPIRATORY (INHALATION) at 06:56

## 2021-08-16 RX ADMIN — ENOXAPARIN SODIUM 40 MG: 40 INJECTION SUBCUTANEOUS at 02:36

## 2021-08-16 RX ADMIN — LEVETIRACETAM 1000 MG: 500 TABLET ORAL at 08:57

## 2021-08-16 RX ADMIN — TIOTROPIUM BROMIDE INHALATION SPRAY 2 PUFF: 3.12 SPRAY, METERED RESPIRATORY (INHALATION) at 06:57

## 2021-08-16 RX ADMIN — INSULIN ASPART 3 UNITS: 100 INJECTION, SOLUTION INTRAVENOUS; SUBCUTANEOUS at 06:38

## 2021-08-16 RX ADMIN — PREDNISONE 40 MG: 20 TABLET ORAL at 08:57

## 2021-08-16 RX ADMIN — BUDESONIDE AND FORMOTEROL FUMARATE DIHYDRATE 2 PUFF: 160; 4.5 AEROSOL RESPIRATORY (INHALATION) at 06:57

## 2021-08-16 RX ADMIN — INSULIN HUMAN 190 UNITS: 500 INJECTION, SOLUTION SUBCUTANEOUS at 06:39

## 2021-08-16 RX ADMIN — SODIUM CHLORIDE, PRESERVATIVE FREE 10 ML: 5 INJECTION INTRAVENOUS at 08:59

## 2021-08-16 RX ADMIN — GABAPENTIN 600 MG: 300 CAPSULE ORAL at 06:53

## 2021-08-16 RX ADMIN — CLOPIDOGREL BISULFATE 75 MG: 75 TABLET ORAL at 08:57

## 2021-08-16 RX ADMIN — OXYCODONE HYDROCHLORIDE AND ACETAMINOPHEN 500 MG: 500 TABLET ORAL at 08:57

## 2021-08-16 RX ADMIN — VORTIOXETINE 20 MG: 20 TABLET, FILM COATED ORAL at 08:58

## 2021-08-16 RX ADMIN — SPIRONOLACTONE 25 MG: 25 TABLET, FILM COATED ORAL at 08:57

## 2021-08-16 RX ADMIN — LISINOPRIL 5 MG: 5 TABLET ORAL at 09:00

## 2021-08-16 RX ADMIN — ZAFIRLUKAST 20 MG: 20 TABLET, COATED ORAL at 08:58

## 2021-08-16 RX ADMIN — ASPIRIN 325 MG: 325 TABLET, COATED ORAL at 08:57

## 2021-08-16 RX ADMIN — NYSTATIN 500000 UNITS: 100000 SUSPENSION ORAL at 08:56

## 2021-08-16 RX ADMIN — Medication 400 UNITS: at 08:57

## 2021-08-16 RX ADMIN — Medication 1 CAPSULE: at 08:57

## 2021-08-16 NOTE — PLAN OF CARE
Goal Outcome Evaluation:  Plan of Care Reviewed With: patient        Progress: improving  Outcome Summary: Pt reports feeling like he is breathing better tonight and that he feels like he maybe more ready to go home.  VSS.  Maintaining oxygen saturation on 2lnc.

## 2021-08-16 NOTE — CASE MANAGEMENT/SOCIAL WORK
Case Management Discharge Note       [employer] AND OXYGEN [employer].       [facility] [place] Phone # : [phone]                                                    Fax        : [phone]    Selected Continued Care - Discharged on 8/16/2021 Admission date: 8/9/2021 - Discharge disposition: Home or Self Care     Transportation Services  Private: Car    Final Discharge Disposition Code: 01 - home or self-care

## 2021-08-16 NOTE — PROGRESS NOTES
Exercise Oximetry    Patient Name:Denzel Harding   MRN: [id number]   Date: 08/16/21             ROOM AIR BASELINE   SpO2% 94   Heart Rate 84   Blood Pressure      EXERCISE ON ROOM AIR SpO2% EXERCISE ON O2 @ 2 LPM SpO2%   1 MINUTE 91 1 MINUTE    2 MINUTES 87 2 MINUTES    3 MINUTES  3 MINUTES          2 lpm  92   4 MINUTES  4 MINUTES 94   5 MINUTES  5 MINUTES 94   6 MINUTES  6 MINUTES 94              Distance Walked  100 Distance Walked   Dyspnea (Loida Scale)  4 Dyspnea (Loida Scale)   Fatigue (Loida Scale)   Fatigue (Loida Scale)   SpO2% Post Exercise  95 SpO2% Post Exercise   HR Post Exercise  88 HR Post Exercise   Time to Recovery   Time to Recovery     Comments: Patient walked in hallway for about 100 ft with assistance from RT and cane.  02 dropped to 87% on room air, 02 @ 2 lpm NC added to patient to manitain Sp02 94%.  Patient will require 02 @ 2lpm when ambulating.

## 2021-08-17 ENCOUNTER — TRANSITIONAL CARE MANAGEMENT TELEPHONE ENCOUNTER (OUTPATIENT)
Dept: CALL CENTER | Facility: HOSPITAL | Age: 61
End: 2021-08-17
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
## 2021-08-17 NOTE — OUTREACH NOTE
Call Center TCM Note      Responses   [facility] patient discharged from?  [facility]   Does the patient have one of the following disease processes/diagnoses(primary or secondary)?  COPD/Pneumonia   Was the primary reason for admission:  COPD exacerbation   TCM attempt successful?  No   Unsuccessful attempts  Attempt 2          Danielle Harris MA    8/17/2021, 15:50 EDT

## 2021-08-17 NOTE — PAYOR COMM NOTE
"Denzel Harding (61 y.o. Male)     Date of Birth Social Security Number Address Home Phone MRN    1960  [address] [address] [phone] [id number]    Gnosticist Marital Status          Caodaism        Admission Date Admission Type Admitting Provider Attending Provider Department, Room/Bed    8/9/21 Emergency Surinder Torres DO  [facility] MED SURG  4, 409/1    Discharge Date Discharge Disposition Discharge Destination        8/16/2021 Home or Self Care              Attending Provider: (none)   Allergies: Ajovy [Fremanezumab-vfrm], Sulfa Antibiotics, Invokana [Canagliflozin], Codeine, Morphine    Isolation: None   Infection: Rhinovirus  (08/10/21)   Code Status: Prior    Ht: 177.8 cm (70\")   Wt: 140 kg (309 lb 1.4 oz)    Admission Cmt: None   Principal Problem: Severe persistent asthma with acute exacerbation [J45.51]                 Active Insurance as of 8/9/2021     Primary Coverage     Payor Plan Insurance Group Employer/Plan Group    ANTHEM MEDICARE REPLACEMENT ANTHEM MEDICARE ADVANTAGE [id number]     Payor Plan Address Payor Plan Phone Number Payor Plan Fax Number Effective Dates    [address] [phone]  1/1/2016 - None Entered    [facility] [address]       Subscriber Name Subscriber Birth Date Member ID       DENZEL HARDING 1960 [id number]           Secondary Coverage     Payor Plan Insurance Group Employer/Plan Group    [id number]      Payor Plan Address Payor Plan Phone Number Payor Plan Fax Number Effective Dates    [address]   7/1/2021 - None Entered    [facility] [id number]       Subscriber Name Subscriber Birth Date Member ID       DENZEL HARDING 1960 [id number]                 Emergency Contacts      (Rel.) Home Phone Work Phone Mobile Phone    Carol Harding (Spouse) -- -- [phone]            Discharge Summary    No notes of this type exist for this encounter.         "

## 2021-08-17 NOTE — OUTREACH NOTE
Call Center TCM Note      Responses   [facility] [place] patient discharged from?  [facility]   Does the patient have one of the following disease processes/diagnoses(primary or secondary)?  COPD/Pneumonia   Was the primary reason for admission:  COPD exacerbation   TCM attempt successful?  No   Unsuccessful attempts  Attempt 1          Danielle Harris MA    8/17/2021, 10:31 EDT

## 2021-08-17 NOTE — PAYOR COMM NOTE
"Denzel Harding (61 y.o. Male)     Date of Birth Social Security Number Address Home Phone MRN    1960  [address] [facility] [phone] [id number]    Sabianist Marital Status          Orthodoxy        Admission Date Admission Type Admitting Provider Attending Provider Department, Room/Bed    8/9/21 Emergency Surinder Torres DO  [facility] MED SURG  4, 409/1    Discharge Date Discharge Disposition Discharge Destination        8/16/2021 Home or Self Care              Attending Provider: (none)   Allergies: Ajovy [Fremanezumab-vfrm], Sulfa Antibiotics, Invokana [Canagliflozin], Codeine, Morphine    Isolation: None   Infection: Rhinovirus  (08/10/21)   Code Status: Prior    Ht: 177.8 cm (70\")   Wt: 140 kg (309 lb 1.4 oz)    Admission Cmt: None   Principal Problem: Severe persistent asthma with acute exacerbation [J45.51]                 Active Insurance as of 8/9/2021     Primary Coverage     Payor Plan Insurance Group Employer/Plan Group    ANTHEM MEDICARE REPLACEMENT ANTHEM MEDICARE ADVANTAGE [id number]     Payor Plan Address Payor Plan Phone Number Payor Plan Fax Number Effective Dates    [address] [phone]  1/1/2016 - None Entered    [facility] [id number]       Subscriber Name Subscriber Birth Date Member ID       DENZLE HARDING 1960 [id number]           Secondary Coverage     Payor Plan Insurance Group Employer/Plan Group    [id number]      Payor Plan Address Payor Plan Phone Number Payor Plan Fax Number Effective Dates    [address]   7/1/2021 - None Entered    [facility] [address]       Subscriber Name Subscriber Birth Date Member ID       DENZEL HARDING 1960 [id number]                 Emergency Contacts      (Rel.) Home Phone Work Phone Mobile Phone    MehdiCarol (Spouse) -- -- [phone]               History & Physical      Carin Yang DO at 08/09/21 1803              Orthodoxy " [facility]  HISTORY AND PHYSICAL    Patient Name: Denzel Harding  : 1960  MRN: [id number]  Primary Care Physician: Isaura Godoy MD  Date of admission: 2021      Subjective   Subjective     Chief Complaint:  Shortness of breath     HPI:  Denzel Harding is a 61 y.o. male with PMHx of asthma, COPD, HEpEF, CAD, lumbar radiculopathy, T2DM, GERD, hx of CVA, hx of pericarditis, HTN, MUSE, severe EDD on BiPAP, HLD, focal Seizures, who presented to [facility] ED with worsening shortness and breath for the past 2 weeks.  He was seen by his pulmonologist Dr. Barajas on 2021.  Due to worsening shortness of breath and productive cough at that time patient was placed on a long steroid taper and prescribed Augmentin 875 for 5 days.  He was also given IM steroids at that visit.  Patient was advised if his symptoms did not worsen that he would need to come to the ED for further evaluation and admission for IV steroids.  Patient symptoms have not improved since that time despite completing Augmentin and steroid taper.  Therefore he presented to [facility] ED for further evaluation.  Upon arrival to the ED patient complained of coughing and wheezing and shortness of breath.  He reports being compliant with all his medications.  He does complain of a productive cough with greenish to brown expectorant.  Complains of a burning sensation in his chest when he has takes a deep breath.  He reports dyspnea with exertion and some mild pedal edema. Therefor the decision was made to admit patient to hospitalist service for further evaluation and management.      COVID Details:    Symptoms:    [] NONE [] Fever [x]  Cough [x] Shortness of breath [] Change in taste/smell      The patient has a COVID HM Topic on their chart, and they are fully vaccinated.      Review of Systems   Constitutional: Positive for fatigue. Negative for chills and fever.   HENT: Negative for congestion, ear pain, rhinorrhea and sore  throat.    Respiratory: Positive for cough (productive, green and brown expectorant ), shortness of breath and wheezing.    Cardiovascular: Positive for leg swelling. Negative for chest pain and palpitations.   Gastrointestinal: Negative for abdominal pain, blood in stool, constipation, diarrhea, nausea and vomiting.   Genitourinary: Negative for dysuria and hematuria.   Musculoskeletal: Positive for back pain (mid thoracic region and radiates down his back ) and gait problem (since cva, balance difficulty).   Skin: Negative.    Neurological: Positive for headaches (since CVA in March ). Negative for dizziness and light-headedness.   Psychiatric/Behavioral: Positive for behavioral problems and dysphoric mood. Negative for sleep disturbance. The patient is not nervous/anxious.         All other systems reviewed and are negative.     Personal History     Past Medical History:   Diagnosis Date   • Anxiety    • Arthritis    • Asthma    • Brachial neuritis 1/19/2017   • Cellulitis     left arm and right leg    • Chest pain    • CHF (congestive heart failure) (CMS/[facility])     Patient reported history May 2020    • COPD (chronic obstructive pulmonary disease) (CMS/[facility])    • Coronary artery disease     Patient reported CABG , 3 vessel   • Depression    • Diabetes mellitus (CMS/[facility])     diagnosed in 1998, checks fsbg 3-4x/day   • Dizziness    • Edema    • Elevated cholesterol    • GERD (gastroesophageal reflux disease)    • H/O chest x-ray 07/04/2015    Mild Left base atelectasis   • H/O echocardiogram 07/05/2015    Normal LVSF. EF of 60-65%. Grade 1 diastolic dysfunction of the LV myocardium. No evidence of pericardial effusion   • H/O exercise stress test    • Hearing loss     No use of hearing aids   • History of fracture     Reported 2 bones in left foot and a finger   • History of herniated intervertebral disc     History of left L5-S1 disc herniation   • History of pneumonia    • History of pneumonia    •  Hyperlipidemia    • Hypertension    • Impaired functional mobility, balance, gait, and endurance    • LOM (loss of memory) 1/19/2017   • Lower back pain     Right   • Measles    • MUSE (nonalcoholic steatohepatitis)    • Neuropathy     feet    • EDD treated with BiPAP     BiPAP HS - instructed to bring mask/machine DOS (setting 13 and 5)   • Palpitations    • Pericardial effusion    • Poor dentition     Patient reported missing multiple teeth   • Renal disorder    • Seizure disorder (CMS/HCC)     focal seizures   • SOB (shortness of breath)    • Staph infection     back after sugery at the incision site    • Stroke (CMS/HCC)     x2 most recent , slight weakness in hands occasionaly    • Wears glasses        Past Surgical History:   Procedure Laterality Date   • BACK SURGERY     • CARDIAC CATHETERIZATION     • CARDIAC CATHETERIZATION N/A 8/11/2017    Procedure: Coronary angiography;  Surgeon: Dallas Kim MD;  Location: [facility] CATH INVASIVE LOCATION;  Service:    • CARDIAC CATHETERIZATION N/A 8/11/2017    Procedure: Left Heart Cath;  Surgeon: Dallas Kim MD;  Location: [facility] CATH INVASIVE LOCATION;  Service:    • CARDIAC CATHETERIZATION N/A 8/11/2017    Procedure: Left ventriculography;  Surgeon: Dallas Kim MD;  Location: [facility] CATH INVASIVE LOCATION;  Service:    • CARDIAC CATHETERIZATION      2002 x1 stent,  x1 stent   • CARDIOVASCULAR STRESS TEST  07/03/2017    WITH DR KIM AT [facility]   • CARPAL TUNNEL RELEASE Right    • CATARACT EXTRACTION W/ INTRAOCULAR LENS IMPLANT Right 6/12/2017    Procedure: CATARACT PHACO EXTRACTION WITH INTRAOCULAR LENS IMPLANT RIGHT ;  Surgeon: Natalie Cao MD;  Location: [facility] OR;  Service:    • CATARACT EXTRACTION W/ INTRAOCULAR LENS IMPLANT Left 7/10/2017    Procedure: CATARACT PHACO EXTRACTION WITH INTRAOCULAR LENS IMPLANT LEFT;  Surgeon: Natalie Cao MD;  Location: [facility] OR;  Service:    • CHOLECYSTECTOMY     • COLONOSCOPY     • COLONOSCOPY N/A  7/2/2020    Procedure: COLONOSCOPY;  Surgeon: Petra Crowell MD;  Location: [facility] ENDOSCOPY;  Service: Gastroenterology;  Laterality: N/A;  poor prep   • CORONARY ANGIOPLASTY WITH STENT PLACEMENT      X1-LAD   • CORONARY ARTERY BYPASS GRAFT N/A 8/15/2017    Procedure: CORONARY ARTERY BYPASS GRAFTING x 3 UTILIZING THE LEFT INTERNAL MAMMARY ARTERY WITH ENDOSCOPIC VEIN HARVESTING OF THE RIGHT GREATER SAPHENOUS VEIN, HAMLET, LAD ENDARECTOMY;  Surgeon: London Damon MD;  Location:  [facility] OR;  Service:    • ENDOSCOPY     • EYE CAPSULOTOMY WITH LASER Right 11/25/2019    Procedure: EYE CAPSULOTOMY WITH LASER RIGHT;  Surgeon: Natalie Cao MD;  Location: [facility] OR;  Service: Ophthalmology   • EYE CAPSULOTOMY WITH LASER Left 12/9/2019    Procedure: EYE CAPSULOTOMY WITH LASER LEFT;  Surgeon: Natalie Cao MD;  Location:  [facility] OR;  Service: Ophthalmology   • EYE SURGERY      cataract surgery both eyes   • INTERVENTIONAL RADIOLOGY PROCEDURE Bilateral 12/31/2019    Procedure: Carotid Cerebral Angiogram;  Surgeon: Damian Dubois MD;  [facility];  Service: Interventional Radiology   • KNEE ARTHROSCOPY Bilateral    • KNEE ARTHROSCOPY Right 2010    Dr Lewis   • KNEE ARTHROSCOPY Left 2008    Dr Jameson   • KNEE MENISCECTOMY Right 10/15/2020    Procedure: Right knee arthroscopy with partial medial and lateral meniscectomy and three compartment chondroplasty.;  Surgeon: South Lewis MD;  Location:  [facility] OR;  Service: Orthopedics;  Laterality: Right;   • MOUTH SURGERY      Oral extractions   • NEUROPLASTY / TRANSPOSITION ULNAR NERVE AT ELBOW Left    • OTHER SURGICAL HISTORY      Foraminotomy and discectomy   • PERICARDIAL WINDOW N/A 8/25/2017    Procedure: PERICARDIAL WINDOW;  Surgeon: Freeman Phillips MD;  Location:  [facility] OR;  Service:        Family History:    family history includes Alcohol abuse in his father; Arthritis in his mother; Heart disease in some other family members;  Hypertension in his mother and other family members; Other in an other family member; Parkinsonism in an other family member; Stroke in some other family members. Otherwise pertinent FHx was reviewed and unremarkable.     Social History:  reports that he quit smoking about 23 years ago. His smoking use included cigarettes. He has a 10.00 pack-year smoking history. He quit smokeless tobacco use about 4 years ago.  His smokeless tobacco use included snuff. He reports current alcohol use. He reports that he does not use drugs.  Social History     Social History Narrative    Caffeine use: 0-1 serving daily.    Patient lives at home with wife.        Medications:  Available home medication information reviewed.  No current facility-administered medications on file prior to encounter.     Current Outpatient Medications on File Prior to Encounter   Medication Sig Dispense Refill   • albuterol sulfate HFA (Ventolin HFA) 108 (90 Base) MCG/ACT inhaler Inhale 2 puffs Every 4 (Four) Hours As Needed for Wheezing or Shortness of Air. 8 g 5   • amitriptyline (ELAVIL) 25 MG tablet TAKE 3 TABLETS BY MOUTH EVERY NIGHT AT BEDTIME 270 tablet 0   • amoxicillin-clavulanate (AUGMENTIN) 875-125 MG per tablet Take 1 tablet by mouth 2 (Two) Times a Day for 5 days. 10 tablet 0   • aspirin  MG tablet Take 1 tablet by mouth Daily. 30 tablet 0   • atorvastatin (LIPITOR) 80 MG tablet Take 1 tablet by mouth Every Night. 90 tablet 1   • azelastine (ASTELIN) 0.1 % nasal spray 1 spray into the nostril(s) as directed by provider 2 (Two) Times a Day As Needed for Rhinitis or Allergies. Use in each nostril as directed 1 each 5   • B-D ULTRAFINE III SHORT PEN 31G X 8 MM misc 2 (Two) Times a Day.     • budesonide-formoterol (Symbicort) 80-4.5 MCG/ACT inhaler Inhale 2 puffs 2 (Two) Times a Day. Rinse mouth with water after use. 1 each 3   • ciprofloxacin (CILOXAN) 0.3 % ophthalmic solution INSTILL 5 DROPS INTO LEFT EAR TWICE DAILY FOR 7 DAYS     •  clopidogrel (PLAVIX) 75 MG tablet Take 1 tablet by mouth Daily. 90 tablet 1   • coenzyme Q10 100 MG capsule Take 100 mg by mouth Daily.     • Cycloset 0.8 MG tablet Take 3.2 mg by mouth Every Morning. 120 tablet 5   • dicyclomine (BENTYL) 10 MG/5ML syrup Take 10 mL by mouth 3 (Three) Times a Day As Needed (abdominal pain.). 473 mL 1   • docusate sodium (Colace) 100 MG capsule Take 1 capsule by mouth 2 (Two) Times a Day As Needed for Constipation. 60 capsule 3   • Dupixent 300 MG/2ML solution prefilled syringe INJECT 300 MG SUBCUTANEOUSLY EVERY OTHER WEEK 4 each 4   • gabapentin (NEURONTIN) 600 MG tablet Take 1 tablet by mouth 3 (Three) Times a Day. 270 tablet 3   • HumuLIN R U-500 KwikPen 500 UNIT/ML solution pen-injector CONCENTRATED injection Inject 235 Units under the skin into the appropriate area as directed 2 (two) times a day.     • ibuprofen (ADVIL,MOTRIN) 800 MG tablet Take 800 mg by mouth.     • ipratropium-albuterol (DUO-NEB) 0.5-2.5 mg/3 ml nebulizer Take 3 mL by nebulization 4 (Four) Times a Day As Needed for Wheezing or Shortness of Air. 360 mL 5   • lamoTRIgine (LaMICtal) 100 MG tablet Take 100 mg by mouth 2 (Two) Times a Day.     • levETIRAcetam (KEPPRA) 1000 MG tablet Take 1,000 mg by mouth Daily.     • levocetirizine (XYZAL) 5 MG tablet Take 1 tablet by mouth Every Evening. 90 tablet 3   • lisinopril (PRINIVIL,ZESTRIL) 5 MG tablet Take 1 tablet by mouth Daily. 90 tablet 1   • meclizine (ANTIVERT) 25 MG tablet Take 1 tablet by mouth 3 (Three) Times a Day As Needed for dizziness. 10 tablet 0   • metoprolol succinate XL (TOPROL-XL) 200 MG 24 hr tablet Take 1 tablet by mouth Daily 90 tablet 2   • Nebulizer device 1 Device Take As Directed. 1 each 0   • omeprazole (priLOSEC) 20 MG capsule Take 1 capsule by mouth Daily. 90 capsule 0   • predniSONE (DELTASONE) 10 MG tablet Take 4 tabs daily x 4 days, then take 3 daily x 3 days, then take 2 daily for 2 days, then take 1 daily x 2 days 31 tablet 0   •  predniSONE (DELTASONE) 20 MG tablet Take 2 tablets daily for 5 days. 10 tablet 0   • promethazine (PHENERGAN) 25 MG tablet Take 1 tablet by mouth Every 6 (Six) Hours As Needed for Nausea or Vomiting. 20 tablet 0   • Semaglutide, 1 MG/DOSE, (Ozempic, 1 MG/DOSE,) 2 MG/1.5ML solution pen-injector Inject 1 mg under the skin into the appropriate area as directed 1 (One) Time Per Week. 9 mL 3   • spironolactone (ALDACTONE) 25 MG tablet Take 1 tablet by mouth Daily. 90 tablet 1   • Tiotropium Bromide Monohydrate (Spiriva Respimat) 1.25 MCG/ACT aerosol solution inhaler Inhale 2 puffs Daily. 4 g 5   • TURMERIC PO Take 500 mg by mouth 2 (Two) Times a Day.     • vitamin C (ASCORBIC ACID) 500 MG tablet Take 500 mg by mouth Daily.     • vitamin E 400 UNIT capsule Take 1 capsule by mouth 2 (two) times a day. 60 capsule 5   • Vortioxetine HBr (Trintellix) 20 MG tablet Take 20 mg by mouth Daily. 90 tablet 0   • zafirlukast (Accolate) 20 MG tablet Take 1 tablet by mouth 2 (Two) Times a Day. 60 tablet 5         Allergies   Allergen Reactions   • Ajovy [Fremanezumab-Vfrm] Other (See Comments)     Caused tightness of chest, vomiting, pain in both arms.   • Sulfa Antibiotics Anaphylaxis, Nausea And Vomiting and Delirium   • Invokana [Canagliflozin] Unknown - Low Severity     DKA   • Codeine Nausea And Vomiting     Can only take with Phenergan   • Morphine Unknown - Low Severity     Does not work. It causes pain       Objective   Objective     Vital Signs:   Temp:  [98.7 °F (37.1 °C)-99.1 °F (37.3 °C)] 99 °F (37.2 °C)  Heart Rate:  [77-91] 84  Resp:  [18-20] 20  BP: (137-143)/(69-83) 143/74       Physical Exam  Vitals and nursing note reviewed.   Constitutional:       General: He is in acute distress (some conversational dyspnea ).      Appearance: He is obese. He is not diaphoretic.      Comments: Pleasant    HENT:      Head: Atraumatic.      Right Ear: External ear normal.      Left Ear: External ear normal.   Eyes:      General:          Right eye: No discharge.         Left eye: No discharge.      Conjunctiva/sclera: Conjunctivae normal.   Cardiovascular:      Rate and Rhythm: Normal rate and regular rhythm.      Heart sounds: No murmur heard.     Pulmonary:      Effort: Pulmonary effort is normal.      Breath sounds: Decreased breath sounds (Decreased breath sounds in the apex bilaterally) and wheezing (in bases bilateral ) present. No rales.   Abdominal:      General: Bowel sounds are normal. There is no distension.      Palpations: Abdomen is soft.      Tenderness: There is abdominal tenderness (mild tenderness in midabd to the right of midline with possible hernia vs lipoma palpated ).   Musculoskeletal:      Right lower leg: Edema present.      Left lower leg: Edema present.      Comments: Trace edema    Skin:     Findings: No rash.   Neurological:      Mental Status: He is alert and oriented to person, place, and time.   Psychiatric:         Mood and Affect: Mood normal.         Thought Content: Thought content normal.            Result Review:  I have personally reviewed the results from the time of this admission to 8/9/2021 21:18 EDT and agree with these findings:  [x]  Laboratory  [x]  Microbiology  [x]  Radiology  [x]  EKG/Telemetry   []  Cardiology/Vascular   []  Pathology  []  Old records  []  Other:  Most notable findings include: COVID-19 -negative     LAB RESULTS:      Lab 08/09/21  1815 08/09/21  1629   WBC  --  10.23   HEMOGLOBIN  --  13.2   HEMATOCRIT  --  40.4   PLATELETS  --  191   NEUTROS ABS  --  6.63   IMMATURE GRANS (ABS)  --  0.18*   LYMPHS ABS  --  2.46   MONOS ABS  --  0.68   EOS ABS  --  0.20   MCV  --  76.2*   PROCALCITONIN 0.10  --    LACTATE 2.0  --          Lab 08/09/21  1629   SODIUM 137   POTASSIUM 4.3   CHLORIDE 100   CO2 26.9   ANION GAP 10.1   BUN 17   CREATININE 0.83   GLUCOSE 223*   CALCIUM 9.2         Lab 08/09/21  1629   TOTAL PROTEIN 6.4   ALBUMIN 3.70   GLOBULIN 2.7   ALT (SGPT) 90*   AST (SGOT) 63*    BILIRUBIN 0.4   ALK PHOS 93         Lab 08/09/21  1629   PROBNP 103.8   TROPONIN T <0.010                 Lab 08/09/21  2031   PH, ARTERIAL 7.427   PCO2, ARTERIAL 42.0   PO2 ART 73.8*   O2 SATURATION ART 93.9*   FIO2 21   HCO3 ART 27.6   BASE EXCESS ART 2.9*   CARBOXYHEMOGLOBIN <0.7     UA    Urinalysis 8/17/20 8/17/20 10/13/20    0420 0420    Squamous Epithelial Cells, UA  0-2    Specific Gravity, UA 1.008  1.028   Ketones, UA Negative  Negative   Blood, UA Negative  Negative   Leukocytes, UA Trace (A)  Negative   Nitrite, UA Negative  Negative   RBC, UA  None Seen    WBC, UA  3-5 (A)    Bacteria, UA  Trace (A)    (A) Abnormal value              Microbiology Results (last 10 days)     Procedure Component Value - Date/Time    COVID-19,[facility] IN-HOUSE,Nasal Swab No Transport Media 3-4 HR TAT - Swab, Nasal Cavity [[id number]]  (Normal) Collected: 08/09/21 1629    Lab Status: Final result Specimen: Swab from Nasal Cavity Updated: 08/09/21 1706     COVID19 Not Detected    Narrative:      Fact sheet for providers: https://www.fda.gov/media/864582/download     Fact sheet for patients: https://www.fda.gov/media/540675/download    Test performed by PCR.    Consider negative results in combination with clinical observations, patient history, and epidemiological information.          No radiology results from the last 24 hrs    Results for orders placed during the hospital encounter of 02/27/21    Adult Transesophageal Echo (HAMLET) W/ Cont if Necessary Per Protocol    Interpretation Summary  · Estimated left ventricular EF = 60% Left ventricular ejection fraction appears to be 56 - 60%. Left ventricular systolic function is normal.  · Saline test results are negative.  · The right atrial cavity is small in size.  · There is moderate calcification of the aortic valve mainly affecting the non-coronary cusp(s).      Assessment/Plan   Assessment & Plan     Active Hospital Problems    Diagnosis  POA   • Acute on chronic  respiratory failure with hypoxia (CMS/[facility]) [J96.21]  Yes   • Morbid obesity with BMI of 40.0-44.9, adult (CMS/[facility]) [E66.01, Z68.41]  Not Applicable   • Severe persistent asthma with acute exacerbation [J45.51]  Unknown   • COPD with acute exacerbation (CMS/[facility]) [J44.1]  Unknown   • Insulin resistance [E88.81]  Unknown   • Type 2 diabetes mellitus with retinopathy, with long-term current use of insulin (CMS/HCC) [E11.319, Z79.4]  Not Applicable   • Chronic diastolic CHF (congestive heart failure) (CMS/[facility]) [I50.32]  Yes   • EDD treated with BiPAP [G47.33]  Unknown       PLAN:   -Acute on chronic respiratory failure/COPD exac  -Asthma exacerbation with failed outpt treatment   -Admit pt to telemetry.  Patient was loaded with 125 Solu-Medrol in the ED.   We will give 80 mg of Solu-Medrol for the next 2 doses and slowly  taper to 60  mg every 8 hours. Increase Symbicort dose for now and continue Duonebs  and Spiriva. Will check sputum cultures and resp panel. Pt was negative for  COVID-19 and has been fully vaccinated.     -Hypoxia-   ABG noted PO2 of 73.8.     -Type 2 DM with significant insulin resistance   -We will continue patient's current dose of Humulin R U-500, 190 units in the  am and 180 units before lunch and dinner. We will also give additional Sliding  scale as needed.     -Obstructive sleep apnea   -Patient will continue use of home BiPAP    -Mild superficial abdominal discomfort in the right mid to lower abdomen   -Patient with possible lipoma versus hernia.  Recommended patient have this  further worked up as an outpatient.    DVT prophylaxis:  Lovenox and SCDs       CODE STATUS:    Code Status and Medical Interventions:   Ordered at: 08/09/21 9016     Level Of Support Discussed With:    Patient     Code Status:    CPR     Medical Interventions (Level of Support Prior to Arrest):    Full       Admission Status:  I believe this patient meets INPATIENT status due to acute resp failure with failed outpt  treatment.  I feel patient’s risk for adverse outcomes and need for care warrant INPATIENT evaluation and I predict the patient’s care encounter to likely last beyond 2 midnights.      Carin Yang DO  08/09/21      Electronically signed by Carin Yang DO at 08/09/21 4891       Discharge Summary    No notes of this type exist for this encounter.

## 2021-08-18 ENCOUNTER — TRANSITIONAL CARE MANAGEMENT TELEPHONE ENCOUNTER (OUTPATIENT)
Dept: CALL CENTER | Facility: HOSPITAL | Age: 61
End: 2021-08-18

## 2021-08-18 DIAGNOSIS — R06.02 SHORTNESS OF BREATH: ICD-10-CM

## 2021-08-18 DIAGNOSIS — J45.51 SEVERE PERSISTENT ASTHMA WITH ACUTE EXACERBATION: Primary | ICD-10-CM

## 2021-08-18 NOTE — OUTREACH NOTE
Call Center TCM Note      Responses   [facility] patient discharged from?  [facility]   Does the patient have one of the following disease processes/diagnoses(primary or secondary)?  COPD/Pneumonia   Was the primary reason for admission:  COPD exacerbation   TCM attempt successful?  No [Verbal release for wife]   Unsuccessful attempts  Attempt 3 [Attempted both patient and wife per verbal release]          Sophia Ceron RN    8/18/2021, 11:47 EDT

## 2021-08-19 DIAGNOSIS — G47.33 OSA (OBSTRUCTIVE SLEEP APNEA): Primary | ICD-10-CM

## 2021-08-25 ENCOUNTER — OFFICE VISIT (OUTPATIENT)
Dept: INTERNAL MEDICINE | Facility: CLINIC | Age: 61
End: 2021-08-25

## 2021-08-25 ENCOUNTER — READMISSION MANAGEMENT (OUTPATIENT)
Dept: CALL CENTER | Facility: HOSPITAL | Age: 61
End: 2021-08-25

## 2021-08-25 VITALS
BODY MASS INDEX: 44.24 KG/M2 | DIASTOLIC BLOOD PRESSURE: 91 MMHG | TEMPERATURE: 98 F | OXYGEN SATURATION: 95 % | WEIGHT: 309 LBS | RESPIRATION RATE: 16 BRPM | SYSTOLIC BLOOD PRESSURE: 156 MMHG | HEART RATE: 94 BPM | HEIGHT: 70 IN

## 2021-08-25 DIAGNOSIS — E11.65 TYPE 2 DIABETES MELLITUS WITH HYPERGLYCEMIA, WITH LONG-TERM CURRENT USE OF INSULIN (HCC): ICD-10-CM

## 2021-08-25 DIAGNOSIS — I25.10 ATHEROSCLEROSIS OF NATIVE CORONARY ARTERY OF NATIVE HEART WITHOUT ANGINA PECTORIS: ICD-10-CM
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
## 2021-08-25 DIAGNOSIS — K43.9 HERNIA OF ANTERIOR ABDOMINAL WALL: ICD-10-CM

## 2021-08-25 DIAGNOSIS — I10 BENIGN ESSENTIAL HYPERTENSION: Primary | ICD-10-CM

## 2021-08-25 DIAGNOSIS — R06.02 SHORTNESS OF BREATH: ICD-10-CM

## 2021-08-25 DIAGNOSIS — Z79.4 TYPE 2 DIABETES MELLITUS WITH HYPERGLYCEMIA, WITH LONG-TERM CURRENT USE OF INSULIN (HCC): ICD-10-CM

## 2021-08-25 PROCEDURE — 99496 TRANSJ CARE MGMT HIGH F2F 7D: CPT | Performed by: INTERNAL MEDICINE

## 2021-08-25 PROCEDURE — 1111F DSCHRG MED/CURRENT MED MERGE: CPT | Performed by: INTERNAL MEDICINE

## 2021-08-25 NOTE — PROGRESS NOTES
Transitional Care Follow Up Visit  Subjective     Denzel Harding is a 61 y.o. male who presents for a transitional care management visit.    Within 48 business hours after discharge our office contacted him via telephone to coordinate his care and needs.      I reviewed and discussed the details of that call along with the discharge summary, hospital problems, inpatient lab results, inpatient diagnostic studies, and consultation reports with Denzel.     Current outpatient and discharge medications have been reconciled for the patient.  Reviewed by: Isaura Godoy MD      Date of TCM Phone Call 8/16/2021   [facility]   Date of Admission 8/9/2021   Date of Discharge 8/16/2021   Discharge Disposition Home or Self Care     Risk for Readmission (LACE) Score: 18 (8/16/2021  6:01 AM)    Chief Complaint   Patient presents with   • Follow-up     [facility] [place] admit 8/9/2021 discharge 8/16/2021   • Hypertension   • Diabetes   • Shortness of Breath   • Fall   • COPD         History of Present Illness   Course During Hospital Stay: The patient is also here to follow up on ER visit at [facility]  And hospitalization , he was  Admitted  On 8/9/2021 and discharged home on 8- ,     Hospital er records ,  Lab reports  and Radiology reports have been reviewed  Today and medications reconciled and updated .   Patient is here to follow up on the blood pressure  The patient is taking the blood pressure medications as prescribed and has had no side effects. The patient is also here to follow up on sugar and is trying to follow a diet.  He follows up with endocrinology at [facility], the patient is  also here to follow up on   Shortness of breath and hypoxia and is following up with pulmonary and is on oxygen  , he states he does need a referral to see his cardiologist, patient is wife who accompanies him today states he also fell at home, he does follow-up with neurosurgery and neurology at [facility]  [place]     The following portions of the patient's history were reviewed and updated as appropriate: allergies, current medications, past family history, past medical history, past social history, past surgical history and problem list.    Review of Systems   Constitutional: Negative for chills, fatigue and fever.   HENT: Negative for congestion, ear pain, rhinorrhea and sore throat.    Respiratory: Positive for shortness of breath. Negative for cough and wheezing.    Cardiovascular: Negative for chest pain, palpitations and leg swelling.   Gastrointestinal: Negative for abdominal pain, blood in stool, constipation, diarrhea, nausea and vomiting.   Genitourinary: Negative for dysuria and hematuria.   Musculoskeletal: Negative for back pain.   Skin: Negative.    Neurological: Negative for dizziness, light-headedness and headaches.   Psychiatric/Behavioral: Negative for dysphoric mood and sleep disturbance. The patient is not nervous/anxious.        Objective   Physical Exam  Vitals and nursing note reviewed.   Constitutional:       General: He is not in acute distress.     Appearance: Normal appearance. He is not diaphoretic.   HENT:      Head: Normocephalic and atraumatic.      Right Ear: External ear normal.      Left Ear: External ear normal.      Nose: Nose normal.   Eyes:      Extraocular Movements: Extraocular movements intact.      Conjunctiva/sclera: Conjunctivae normal.   Neck:      Trachea: Trachea normal.   Cardiovascular:      Rate and Rhythm: Normal rate and regular rhythm.      Heart sounds: Normal heart sounds.   Pulmonary:      Effort: Pulmonary effort is normal. No respiratory distress.   Abdominal:      General: Abdomen is flat.      Hernia: A hernia is present.      Comments:  Right side  Anterior wall   Musculoskeletal:         General: Deformity present.      Cervical back: Neck supple.      Comments: Moves all limbs   in wheelchair   Skin:     General: Skin is warm and dry.      Findings: No  erythema.   Neurological:      Mental Status: He is alert and oriented to person, place, and time.      Comments: No gross motor or sensory deficits         Assessment/Plan   Diagnoses and all orders for this visit:    1. Benign essential hypertension (Primary)  -     Ambulatory Referral to Cardiology    2. Type 2 diabetes mellitus with hyperglycemia, with long-term current use of insulin (CMS/[facility])    3. Shortness of breath  -     Ambulatory Referral to Cardiology    4. Atherosclerosis of native coronary artery of native heart without angina pectoris  -     Ambulatory Referral to Cardiology    5. Hernia of anterior abdominal wall  -     Ambulatory Referral to General Surgery    6. Fall, initial encounter      Data reviewed:  Discharge Summary by Surinder Torres DO (08/16/2021 14:28)  H&P by Carin Yang DO (08/09/2021 18:03)      Plan:  1.  Benign essential hypertension: Will continue current medication, low-sodium diet advised, Counseled to regularly check BP at home with goal averaging <130/80.   2. Diabetes  Mellitus  :  Reviewed  fasting CMP  and hba1c  9.9  , diet and exercise counseled , Will continue current medications , keep endocrinology appointment at [facility]   3.  Shortness of breath: Advised to follow-up with pulmonary, will refer patient to cardiology  4.  CAD: We will refer patient to cardiology and continue current medications  5.  Anterior abdominal wall hernia ?:  We will refer patient to surgery  6.  Fall: Fall precautions advised

## 2021-08-25 NOTE — OUTREACH NOTE
COPD/PN Week 2 Survey      Responses   [facility] [place] patient discharged from?  [facility]   Does the patient have one of the following disease processes/diagnoses(primary or secondary)?  COPD/Pneumonia   Was the primary reason for admission:  COPD exacerbation   Week 2 attempt successful?  Yes   Call start time  1145   Rescheduled  Rescheduled-pt requested [Wife states at Dr kavita at time of call. Requested callback. ]   Call end time  1145   Person spoke with today (if not patient) and relationship  Spouse          Danielle Lara RN

## 2021-09-09 NOTE — PROGRESS NOTES
Patient: Denzel Harding    YOB: 1960    Date: 09/14/2021    Primary Care Provider: Isaura Godoy MD    Chief Complaint   Patient presents with   • Hernia     UMBILICAL       SUBJECTIVE:    History of present illness:  I saw the patient in the office today as a consultation for evaluation and treatment of an umbilical hernia.    Apparently has had some sharp discomfort to the right side of the umbilicus for several months associated with a palpable mass, seems to be worse with movement, not relieved, nonradiating in nature.    The patient does have a longstanding history of previous lung damage due to a work-related issue, he has been on oxygen for many years and is in a wheelchair because of extreme shortness of breath.    The following portions of the patient's history were reviewed and updated as appropriate: allergies, current medications, past family history, past medical history, past social history, past surgical history and problem list.    Review of Systems   Constitutional: Negative for chills, fever and unexpected weight change.   HENT: Negative for trouble swallowing and voice change.    Eyes: Negative for visual disturbance.   Respiratory: Negative for apnea, cough, chest tightness, shortness of breath and wheezing.    Cardiovascular: Negative for chest pain, palpitations and leg swelling.   Gastrointestinal: Negative for abdominal distention, abdominal pain, anal bleeding, blood in stool, constipation, diarrhea, nausea, rectal pain and vomiting.   Endocrine: Negative for cold intolerance and heat intolerance.   Genitourinary: Negative for difficulty urinating, dysuria, flank pain, scrotal swelling and testicular pain.   Musculoskeletal: Negative for back pain, gait problem and joint swelling.   Skin: Negative for color change, rash and wound.   Neurological: Negative for dizziness, syncope, speech difficulty, weakness, numbness and headaches.   Hematological: Negative for adenopathy.  Does not bruise/bleed easily.   Psychiatric/Behavioral: Negative for confusion. The patient is not nervous/anxious.        History:  Past Medical History:   Diagnosis Date   • Anxiety    • Arthritis    • Asthma    • Brachial neuritis 1/19/2017   • Cellulitis     left arm and right leg    • Chest pain    • CHF (congestive heart failure) (CMS/[facility])     Patient reported history May 2020    • COPD (chronic obstructive pulmonary disease) (CMS/[facility])    • Coronary artery disease     Patient reported CABG , 3 vessel   • Depression    • Diabetes mellitus (CMS/[facility])     diagnosed in 1998, checks fsbg 3-4x/day   • Dizziness    • Edema    • Elevated cholesterol    • GERD (gastroesophageal reflux disease)    • H/O chest x-ray 07/04/2015    Mild Left base atelectasis   • H/O echocardiogram 07/05/2015    Normal LVSF. EF of 60-65%. Grade 1 diastolic dysfunction of the LV myocardium. No evidence of pericardial effusion   • H/O exercise stress test    • Hearing loss     No use of hearing aids   • History of fracture     Reported 2 bones in left foot and a finger   • History of herniated intervertebral disc     History of left L5-S1 disc herniation   • History of pneumonia    • History of pneumonia    • Hyperlipidemia    • Hypertension    • Impaired functional mobility, balance, gait, and endurance    • LOM (loss of memory) 1/19/2017   • Lower back pain     Right   • Measles    • MUSE (nonalcoholic steatohepatitis)    • Neuropathy     feet    • EDD treated with BiPAP     BiPAP HS - instructed to bring mask/machine DOS (setting 13 and 5)   • Palpitations    • Pericardial effusion    • Poor dentition     Patient reported missing multiple teeth   • Renal disorder    • Seizure disorder (CMS/[facility])     focal seizures   • SOB (shortness of breath)    • Staph infection     back after sugery at the incision site    • Stroke (CMS/[facility])     x2 most recent , slight weakness in hands occasionaly    • Wears glasses        Past Surgical  History:   Procedure Laterality Date   • BACK SURGERY     • CARDIAC CATHETERIZATION     • CARDIAC CATHETERIZATION N/A 8/11/2017    Procedure: Coronary angiography;  Surgeon: Dallas Hernandez MD;  Location: [facility] CATH INVASIVE LOCATION;  Service:    • CARDIAC CATHETERIZATION N/A 8/11/2017    Procedure: Left Heart Cath;  Surgeon: Dallas Hernandez MD;  Location: [facility] CATH INVASIVE LOCATION;  Service:    • CARDIAC CATHETERIZATION N/A 8/11/2017    Procedure: Left ventriculography;  Surgeon: Dallas Hernandez MD;  Location: [facility] CATH INVASIVE LOCATION;  Service:    • CARDIAC CATHETERIZATION      2002 x1 stent,  x1 stent   • CARDIOVASCULAR STRESS TEST  07/03/2017    WITH DR HERNANDEZ AT [facility]   • CARPAL TUNNEL RELEASE Right    • CATARACT EXTRACTION W/ INTRAOCULAR LENS IMPLANT Right 6/12/2017    Procedure: CATARACT PHACO EXTRACTION WITH INTRAOCULAR LENS IMPLANT RIGHT ;  Surgeon: Natalie Cao MD;  Location: [facility] OR;  Service:    • CATARACT EXTRACTION W/ INTRAOCULAR LENS IMPLANT Left 7/10/2017    Procedure: CATARACT PHACO EXTRACTION WITH INTRAOCULAR LENS IMPLANT LEFT;  Surgeon: Natalie Cao MD;  Location: [facility] OR;  Service:    • CHOLECYSTECTOMY     • COLONOSCOPY     • COLONOSCOPY N/A 7/2/2020    Procedure: COLONOSCOPY;  Surgeon: Petra Crowell MD;  Location: [facility] ENDOSCOPY;  Service: Gastroenterology;  Laterality: N/A;  poor prep   • CORONARY ANGIOPLASTY WITH STENT PLACEMENT      X1-LAD   • CORONARY ARTERY BYPASS GRAFT N/A 8/15/2017    Procedure: CORONARY ARTERY BYPASS GRAFTING x 3 UTILIZING THE LEFT INTERNAL MAMMARY ARTERY WITH ENDOSCOPIC VEIN HARVESTING OF THE RIGHT GREATER SAPHENOUS VEIN, HAMLET, LAD ENDARECTOMY;  Surgeon: London Damon MD;  Location: [facility] OR;  Service:    • ENDOSCOPY     • EYE CAPSULOTOMY WITH LASER Right 11/25/2019    Procedure: EYE CAPSULOTOMY WITH LASER RIGHT;  Surgeon: Natalie Cao MD;  Location: [facility] OR;  Service: Ophthalmology   • EYE CAPSULOTOMY WITH LASER Left  2019    Procedure: EYE CAPSULOTOMY WITH LASER LEFT;  Surgeon: Natalie Cao MD;  Location:  [facility] OR;  Service: Ophthalmology   • EYE SURGERY      cataract surgery both eyes   • INTERVENTIONAL RADIOLOGY PROCEDURE Bilateral 2019    Procedure: Carotid Cerebral Angiogram;  Surgeon: Damian Dubois MD;  Location:  [facility] CATH INVASIVE LOCATION;  Service: Interventional Radiology   • KNEE ARTHROSCOPY Bilateral    • KNEE ARTHROSCOPY Right     Dr Lewis   • KNEE ARTHROSCOPY Left     Dr Jameson   • KNEE MENISCECTOMY Right 10/15/2020    Procedure: Right knee arthroscopy with partial medial and lateral meniscectomy and three compartment chondroplasty.;  Surgeon: South Lewis MD;  Location:  [facility] OR;  Service: Orthopedics;  Laterality: Right;   • MOUTH SURGERY      Oral extractions   • NEUROPLASTY / TRANSPOSITION ULNAR NERVE AT ELBOW Left    • OTHER SURGICAL HISTORY      Foraminotomy and discectomy   • PERICARDIAL WINDOW N/A 2017    Procedure: PERICARDIAL WINDOW;  Surgeon: Freeman Phillips MD;  Location:  [facility] OR;  Service:        Family History   Problem Relation Age of Onset   • Hypertension Mother    • Arthritis Mother    • Stomach cancer Mother    • Alcohol abuse Father    • Heart disease Other    • Hypertension Other    • Other Other         Neurologic disorder   • Heart attack Other    • Parkinsonism Other    • Stroke Other    • Heart disease Other    • Hypertension Other    • Heart disease Other    • Stroke Other    • Hypertension Other    • Colon cancer Neg Hx        Social History     Tobacco Use   • Smoking status: Former Smoker     Packs/day: 1.00     Years: 10.00     Pack years: 10.00     Types: Cigarettes     Quit date: 1998     Years since quittin.7   • Smokeless tobacco: Former User     Types: Snuff     Quit date:    Substance Use Topics   • Alcohol use: Yes     Comment: 3 drinks a year   • Drug use: No       Allergies:  Allergies   Allergen Reactions   • Ajovy  [Fremanezumab-Vfrm] Other (See Comments)     Caused tightness of chest, vomiting, pain in both arms.   • Sulfa Antibiotics Anaphylaxis, Nausea And Vomiting and Delirium   • Invokana [Canagliflozin] Unknown - Low Severity     DKA   • Codeine Nausea And Vomiting     Can only take with Phenergan   • Morphine Unknown - Low Severity     Does not work. It causes pain       Medications:    Current Outpatient Medications:   •  albuterol sulfate HFA (Ventolin HFA) 108 (90 Base) MCG/ACT inhaler, Inhale 2 puffs Every 4 (Four) Hours As Needed for Wheezing or Shortness of Air., Disp: 8 g, Rfl: 5  •  amitriptyline (ELAVIL) 25 MG tablet, TAKE 3 TABLETS BY MOUTH EVERY NIGHT AT BEDTIME, Disp: 270 tablet, Rfl: 0  •  aspirin  MG tablet, Take 1 tablet by mouth Daily., Disp: 30 tablet, Rfl: 0  •  atorvastatin (LIPITOR) 80 MG tablet, Take 1 tablet by mouth Every Night., Disp: 90 tablet, Rfl: 1  •  azelastine (ASTELIN) 0.1 % nasal spray, 1 spray into the nostril(s) as directed by provider 2 (Two) Times a Day As Needed for Rhinitis or Allergies. Use in each nostril as directed, Disp: 1 each, Rfl: 5  •  B-D ULTRAFINE III SHORT PEN 31G X 8 MM misc, 2 (Two) Times a Day., Disp: , Rfl:   •  budesonide-formoterol (Symbicort) 80-4.5 MCG/ACT inhaler, Inhale 2 puffs 2 (Two) Times a Day. Rinse mouth with water after use., Disp: 1 each, Rfl: 3  •  ciprofloxacin (CILOXAN) 0.3 % ophthalmic solution, INSTILL 5 DROPS INTO LEFT EAR TWICE DAILY FOR 7 DAYS, Disp: , Rfl:   •  clopidogrel (PLAVIX) 75 MG tablet, Take 1 tablet by mouth Daily., Disp: 90 tablet, Rfl: 1  •  coenzyme Q10 100 MG capsule, Take 100 mg by mouth Daily., Disp: , Rfl:   •  docusate sodium (Colace) 100 MG capsule, Take 1 capsule by mouth 2 (Two) Times a Day As Needed for Constipation., Disp: 60 capsule, Rfl: 3  •  Dupixent 300 MG/2ML solution prefilled syringe, INJECT 300 MG SUBCUTANEOUSLY EVERY OTHER WEEK, Disp: 4 each, Rfl: 4  •  gabapentin (NEURONTIN) 600 MG tablet, Take 1 tablet  by mouth 3 (Three) Times a Day., Disp: 270 tablet, Rfl: 3  •  HumuLIN R U-500 KwikPen 500 UNIT/ML solution pen-injector CONCENTRATED injection, Inject 190 Units under the skin into the appropriate area as directed Every Morning. Takes around 9AM, Disp: , Rfl:   •  ibuprofen (ADVIL,MOTRIN) 800 MG tablet, Take 800 mg by mouth., Disp: , Rfl:   •  Insulin Regular Human, Conc, (HumuLIN R U-500 KwikPen) 500 UNIT/ML solution pen-injector CONCENTRATED injection, Inject 180 Units under the skin into the appropriate area as directed Every Evening. Takes at 3PM, Disp: , Rfl:   •  Insulin Regular Human, Conc, (HumuLIN R U-500 KwikPen) 500 UNIT/ML solution pen-injector CONCENTRATED injection, Inject 180 Units under the skin into the appropriate area as directed Every Night. Patient takes at 8PM, Disp: , Rfl:   •  ipratropium-albuterol (DUO-NEB) 0.5-2.5 mg/3 ml nebulizer, Take 3 mL by nebulization 4 (Four) Times a Day As Needed for Wheezing or Shortness of Air., Disp: 360 mL, Rfl: 5  •  isosorbide mononitrate (IMDUR) 60 MG 24 hr tablet, Take 1 tablet by mouth Daily, Disp: 30 tablet, Rfl: 0  •  lamoTRIgine (LaMICtal) 100 MG tablet, Take 100 mg by mouth 2 (Two) Times a Day., Disp: , Rfl:   •  levETIRAcetam (KEPPRA) 1000 MG tablet, Take 1,000 mg by mouth 2 (Two) Times a Day., Disp: , Rfl:   •  levocetirizine (XYZAL) 5 MG tablet, Take 1 tablet by mouth Every Evening., Disp: 90 tablet, Rfl: 3  •  lisinopril (PRINIVIL,ZESTRIL) 5 MG tablet, Take 1 tablet by mouth Daily., Disp: 90 tablet, Rfl: 1  •  meclizine (ANTIVERT) 25 MG tablet, Take 1 tablet by mouth 3 (Three) Times a Day As Needed for dizziness., Disp: 10 tablet, Rfl: 0  •  Nebulizer device, 1 Device Take As Directed., Disp: 1 each, Rfl: 0  •  omeprazole (priLOSEC) 20 MG capsule, Take 1 capsule by mouth Daily. (Patient taking differently: Take 20 mg by mouth Every Night.), Disp: 90 capsule, Rfl: 0  •  predniSONE (DELTASONE) 10 MG tablet, Take 4 tablets by mouth daily for 4 days,  "then take 3 tablets daily for 3 days, then take 2 tablets daily for 2 days, then take 1 tablet daily for 2 days, Disp: 31 tablet, Rfl: 0  •  promethazine (PHENERGAN) 25 MG tablet, Take 1 tablet by mouth Every 6 (Six) Hours As Needed for Nausea or Vomiting., Disp: 20 tablet, Rfl: 0  •  Semaglutide, 1 MG/DOSE, (Ozempic, 1 MG/DOSE,) 2 MG/1.5ML solution pen-injector, Inject 1 mg under the skin into the appropriate area as directed 1 (One) Time Per Week., Disp: 9 mL, Rfl: 3  •  spironolactone (ALDACTONE) 25 MG tablet, Take 1 tablet by mouth Daily., Disp: 90 tablet, Rfl: 1  •  Tiotropium Bromide Monohydrate (Spiriva Respimat) 1.25 MCG/ACT aerosol solution inhaler, Inhale 2 puffs Daily., Disp: 4 g, Rfl: 5  •  TURMERIC PO, Take 500 mg by mouth 2 (Two) Times a Day., Disp: , Rfl:   •  vitamin C (ASCORBIC ACID) 500 MG tablet, Take 500 mg by mouth Daily., Disp: , Rfl:   •  vitamin D3 125 MCG (5000 UT) capsule capsule, Take 5,000 Units by mouth Daily., Disp: , Rfl:   •  vitamin E 400 UNIT capsule, Take 1 capsule by mouth 2 (two) times a day., Disp: 60 capsule, Rfl: 5  •  Vortioxetine HBr (Trintellix) 20 MG tablet, Take 20 mg by mouth Daily., Disp: 90 tablet, Rfl: 0  •  zafirlukast (Accolate) 20 MG tablet, Take 1 tablet by mouth 2 (Two) Times a Day., Disp: 60 tablet, Rfl: 5  •  amoxicillin (AMOXIL) 500 MG capsule, TAKE 1 CAPSULE BY MOUTH THREE TIMES DAILY UNTIL GONE, Disp: , Rfl:   •  metoprolol succinate XL (TOPROL-XL) 200 MG 24 hr tablet, Take 1 tablet by mouth Daily, Disp: 90 tablet, Rfl: 2    OBJECTIVE:    Vital Signs:   Vitals:    09/14/21 1552   BP: 120/72   Pulse: 91   Temp: 97.7 °F (36.5 °C)   SpO2: 98%   Weight: (!) 140 kg (309 lb)   Height: 177.8 cm (70\")       Physical Exam:   General Appearance:    Alert, cooperative, in no acute distress   Head:    Normocephalic, without obvious abnormality, atraumatic   Eyes:            Lids and lashes normal, conjunctivae and sclerae normal, no   icterus, no pallor, corneas clear, " PERRLA   Ears:    Ears appear intact with no abnormalities noted   Throat:   No oral lesions, no thrush, oral mucosa moist   Neck:   No adenopathy, supple, trachea midline, no thyromegaly, no   carotid bruit, no JVD   Lungs:     Clear to auscultation,respirations regular, even and                  unlabored    Heart:    Regular rhythm and normal rate, normal S1 and S2, no            murmur   Abdomen:     no masses, no organomegaly, soft non-tender, non-distended, no guarding, there is evidence of a palpable mass to the right side of the umbilicus   Extremities:   Moves all extremities well, no edema, no cyanosis, no             redness   Pulses:   Pulses palpable and equal bilaterally   Skin:   No bleeding, bruising or rash   Lymph nodes:   No palpable adenopathy   Neurologic:   Cranial nerves 2 - 12 grossly intact, sensation intact        Results Review:   I reviewed the patient's new clinical results.  I reviewed the patient's new imaging results and agree with the interpretation.  I reviewed the patient's other test results and agree with the interpretation    Review of Systems was reviewed and confirmed as accurate as documented by the MA.    ASSESSMENT/PLAN:    1. Umbilical hernia without obstruction and without gangrene        I had a detailed and extensive discussion with the patient and his wife in the office today and ultrasound did suggest evidence of a ventral hernia to the right side of the umbilicus.  He needs to undergo CT scan of the abdomen and pelvis and then I will see the patient back in the office in follow-up.I discussed the patients findings and my recommendations with patient.    Electronically signed by Torres Kim MD  09/14/21

## 2021-09-14 ENCOUNTER — OFFICE VISIT (OUTPATIENT)
Dept: SURGERY | Facility: CLINIC | Age: 61
End: 2021-09-14

## 2021-09-14 VITALS
HEART RATE: 91 BPM | SYSTOLIC BLOOD PRESSURE: 120 MMHG | OXYGEN SATURATION: 98 % | BODY MASS INDEX: 44.24 KG/M2 | WEIGHT: 309 LBS | TEMPERATURE: 97.7 F | DIASTOLIC BLOOD PRESSURE: 72 MMHG | HEIGHT: 70 IN

## 2021-09-14 DIAGNOSIS — K42.9 UMBILICAL HERNIA WITHOUT OBSTRUCTION AND WITHOUT GANGRENE: Primary | ICD-10-CM

## 2021-09-14 PROCEDURE — 99203 OFFICE O/P NEW LOW 30 MIN: CPT | Performed by: SURGERY

## 2021-09-14 RX ORDER — AMOXICILLIN 500 MG/1
CAPSULE ORAL
COMMUNITY
Start: 2021-09-07 | End: 2021-11-09

## 2021-09-16 ENCOUNTER — OFFICE VISIT (OUTPATIENT)
Dept: PULMONOLOGY | Facility: CLINIC | Age: 61
End: 2021-09-16

## 2021-09-16 VITALS
HEIGHT: 70 IN | RESPIRATION RATE: 16 BRPM | WEIGHT: 272 LBS | HEART RATE: 65 BPM | BODY MASS INDEX: 38.94 KG/M2 | DIASTOLIC BLOOD PRESSURE: 60 MMHG | OXYGEN SATURATION: 90 % | SYSTOLIC BLOOD PRESSURE: 108 MMHG

## 2021-09-16 DIAGNOSIS — J45.50 SEVERE PERSISTENT ASTHMA WITHOUT COMPLICATION: ICD-10-CM

## 2021-09-16 DIAGNOSIS — J30.89 OTHER ALLERGIC RHINITIS: ICD-10-CM

## 2021-09-16 DIAGNOSIS — G47.33 OSA (OBSTRUCTIVE SLEEP APNEA): Primary | ICD-10-CM

## 2021-09-16 PROCEDURE — 99214 OFFICE O/P EST MOD 30 MIN: CPT | Performed by: NURSE PRACTITIONER

## 2021-09-16 NOTE — PROGRESS NOTES
"Chief Complaint   Patient presents with   • Follow-up   • Shortness of Breath         Subjective   Denzel Harding is a 61 y.o. male.     History of Present Illness   Patient is here today for follow up of asthma and shortness of breath.     He was in the hospital in August due to severe asthma exacerbation.    He is breathing better since he was discharged home.  He states he walked to the bathroom without oxygen and his oxygen saturation dropped to 62%.  He reports his cough has improved but he does have an intermittent productive cough.    He has finished prednisone.    The patient says that he is compliant with pulmonary medicines, as prescribed.  He is using Symbicort twice a day and Spiriva daily.  He uses the rescue inhaler maybe twice a week but uses rescue medication in the nebulizer 3 times a day.    He takes Accolate twice a day.    He takes Dupixent injections on a regular basis and is due for his next one on Saturday.    He is using BiPAP at a pressure of 13/5 with a nasal mask.      The following portions of the patient's history were reviewed and updated as appropriate: allergies, current medications, past family history, past medical history, past social history and past surgical history.    Review of Systems   Constitutional: Negative for chills and fever.   HENT: Negative for rhinorrhea, sinus pressure, sneezing and sore throat.    Respiratory: Positive for wheezing. Negative for cough and shortness of breath.    Psychiatric/Behavioral: Negative for sleep disturbance.       Objective   Visit Vitals  /60   Pulse 65   Resp 16   Ht 177.8 cm (70\")   Wt 123 kg (272 lb) Comment: patient reported   SpO2 90% Comment: resting on room air   BMI 39.03 kg/m²       Physical Exam  Vitals reviewed.   HENT:      Head: Atraumatic.      Mouth/Throat:      Mouth: Mucous membranes are moist.      Comments: Crowded oropharynx. Edentulous.  Eyes:      Extraocular Movements: Extraocular movements intact. "   Cardiovascular:      Rate and Rhythm: Normal rate and regular rhythm.   Pulmonary:      Effort: Pulmonary effort is normal. No respiratory distress.      Comments: Somewhat decreased A/E without wheezing.  Abdominal:      Comments: Obese abdomen.   Musculoskeletal:      Cervical back: Neck supple.      Comments: In wheelchair. Has cane with him.   Skin:     General: Skin is warm.   Neurological:      Mental Status: He is alert and oriented to person, place, and time.             Assessment/Plan   Diagnoses and all orders for this visit:    1. EDD (obstructive sleep apnea) (Primary)    2. Other allergic rhinitis    3. Severe persistent asthma without complication           Return for CANCEL APPT IN OCTOBER, keep appt in January.    DISCUSSION (if any):  I reviewed his hospital records including discharge summary and pertinent labs and radiology.    Discharge summary mentions acute asthma exacerbation and chronic respiratory failure with hypoxia.    Chest x-ray from August 9, 2021 shows bibasilar opacities felt to reflect atelectasis.  PROCEDURE: XR CHEST 1 VW-     HISTORY: SOA     COMPARISON: 7/26/2021.     FINDINGS: The patient is status post median sternotomy and CABG  procedure. The heart is normal in size. The mediastinum is unremarkable.  There are low lung volumes with bibasilar opacities likely atelectasis.  No effusions are evident. There is no pneumothorax.  There are no acute  osseous abnormalities.     IMPRESSION:  Low lung volumes with bibasilar opacities felt to reflect  atelectasis.     Continued followup is recommended.     This report was finalized on 8/10/2021 7:29 AM by Aspen Decker M.D..    Results for MIKY HASSAN (MRN [id number]) as of 9/17/2021 10:59   Ref. Range 8/9/2021 20:31   pH, Arterial Latest Ref Range: 7.350 - 7.450 pH units 7.427   pCO2, Arterial Latest Ref Range: 35.0 - 45.0 mm Hg 42.0   pO2, Arterial Latest Ref Range: 75.0 - 100.0 mm Hg 73.8 (L)   HCO3, Arterial Latest  Ref Range: 22.0 - 28.0 mmol/L 27.6   Base Excess Latest Ref Range: 0.0 - 2.0 mmol/L 2.9 (H)   O2 Saturation, Arterial Latest Ref Range: 94.0 - 100.0 % 93.9 (L)   A-a Gradiant Latest Units: mmHg 23.2   Carboxyhemoglobin Latest Ref Range: 0 - 2 % <0.7   Methemoglobin Latest Ref Range: 0.00 - 1.50 % 0.30   Oxyhemoglobin Latest Ref Range: 94 - 99 % 93.1 (L)   Hematocrit, Blood Gas Latest Units: % 41.8   Site Unknown Right Brachial   Martin's Test Unknown N/A   Modality Unknown Room Air   FIO2 Latest Units: % 21   Ventilator Mode Unknown NA   Barometric Pressure for Blood Gas Latest Units: mmHg 735       He had been treated outpatient for an acute asthma exacerbation at the end of July prior to admission.    His symptoms of asthma are under adequate control at this time.  He should continue using Symbicort, Spiriva, Dupixent, Accolate, and the rescue medication all as directed.    You need to continue using BiPAP at the current pressure of 13/5 with a nasal mask.    He is compliant with BiPAP use in greater than 70%.    Patient's medications for underlying asthma were reviewed with him in great detail.    Any needed adjustments to his pulmonary medications, either for clinical or insurance coverage reasons, have been made and are reflected in the orders.    Side effects of prescribed medications discussed with the patient.    Asthma action plan with discussed with him.    The patient was asked to call this office if the symptoms worsen.    He is up-to-date on Covid vaccination.  I reminded him to get the flu vaccine when it is available.    Dictated utilizing Dragon dictation.    This document was electronically signed by ANTONIO Ramírez September 16, 2021  10:42 EDT

## 2021-09-17 ENCOUNTER — PATIENT ROUNDING (BHMG ONLY) (OUTPATIENT)
Dept: SURGERY | Facility: CLINIC | Age: 61
End: 2021-09-17

## 2021-09-17 NOTE — PROGRESS NOTES
September 17, 2021    Hello, may I speak with Denzel Harding?    My name is Gunjan Oneal      I am  with [facility]  [address]  [facility] [address] [phone].    Before we get started may I verify your date of birth? 1960    I am calling to officially welcome you to our practice and ask about your recent visit. Is this a good time to talk? yes    Tell me about your visit with us. What things went well? Patient unavailable spoke with Amelia his wife. She states the visit was delightful and thorough.       We're always looking for ways to make our patients' experiences even better. Do you have recommendations on ways we may improve?  no    Overall were you satisfied with your first visit to our practice? yes       I appreciate you taking the time to speak with me today. Is there anything else I can do for you? no      Thank you, and have a great day.

## 2021-09-22 ENCOUNTER — HOSPITAL ENCOUNTER (OUTPATIENT)
Dept: CT IMAGING | Facility: HOSPITAL | Age: 61
Discharge: HOME | End: 2021-09-22
Admitting: SURGERY

## 2021-09-22 DIAGNOSIS — K42.9 UMBILICAL HERNIA WITHOUT OBSTRUCTION AND WITHOUT GANGRENE: ICD-10-CM

## 2021-09-22 PROCEDURE — 74176 CT ABD & PELVIS W/O CONTRAST: CPT

## 2021-09-24 ENCOUNTER — TELEPHONE (OUTPATIENT)
Dept: SURGERY | Facility: CLINIC | Age: 61
End: 2021-09-24

## 2021-09-24 DIAGNOSIS — R91.8 GROUND GLASS OPACITY PRESENT ON IMAGING OF LUNG: Primary | ICD-10-CM

## 2021-09-25 ENCOUNTER — HOSPITAL ENCOUNTER (EMERGENCY)
Facility: HOSPITAL | Age: 61
Discharge: HOME | End: 2021-09-25
Attending: EMERGENCY MEDICINE | Admitting: EMERGENCY MEDICINE

## 2021-09-25 ENCOUNTER — APPOINTMENT (OUTPATIENT)
Dept: GENERAL RADIOLOGY | Facility: HOSPITAL | Age: 61
End: 2021-09-25

## 2021-09-25 VITALS
HEART RATE: 86 BPM | BODY MASS INDEX: 38.94 KG/M2 | RESPIRATION RATE: 20 BRPM | SYSTOLIC BLOOD PRESSURE: 131 MMHG | DIASTOLIC BLOOD PRESSURE: 79 MMHG | TEMPERATURE: 99.4 F | OXYGEN SATURATION: 99 % | WEIGHT: 272 LBS | HEIGHT: 70 IN

## 2021-09-25 DIAGNOSIS — B34.9 VIRAL SYNDROME: Primary | ICD-10-CM

## 2021-09-25 LAB
ALBUMIN SERPL-MCNC: 3.5 G/DL (ref 3.5–5.2)
ALBUMIN/GLOB SERPL: 1.3 G/DL
ALP SERPL-CCNC: 94 U/L (ref 39–117)
ALT SERPL W P-5'-P-CCNC: 32 U/L (ref 1–41)
ANION GAP SERPL CALCULATED.3IONS-SCNC: 9.4 MMOL/L (ref 5–15)
AST SERPL-CCNC: 30 U/L (ref 1–40)
BASOPHILS # BLD AUTO: 0.05 10*3/MM3 (ref 0–0.2)
BASOPHILS NFR BLD AUTO: 0.8 % (ref 0–1.5)
BILIRUB SERPL-MCNC: 1 MG/DL (ref 0–1.2)
BUN SERPL-MCNC: 8 MG/DL (ref 8–23)
BUN/CREAT SERPL: 11.9 (ref 7–25)
CALCIUM SPEC-SCNC: 8.9 MG/DL (ref 8.6–10.5)
CHLORIDE SERPL-SCNC: 101 MMOL/L (ref 98–107)
CO2 SERPL-SCNC: 27.6 MMOL/L (ref 22–29)
CREAT SERPL-MCNC: 0.67 MG/DL (ref 0.76–1.27)
DEPRECATED RDW RBC AUTO: 49.3 FL (ref 37–54)
EOSINOPHIL # BLD AUTO: 0.25 10*3/MM3 (ref 0–0.4)
EOSINOPHIL NFR BLD AUTO: 3.8 % (ref 0.3–6.2)
ERYTHROCYTE [DISTWIDTH] IN BLOOD BY AUTOMATED COUNT: 17.6 % (ref 12.3–15.4)
GFR SERPL CREATININE-BSD FRML MDRD: 121 ML/MIN/1.73
GLOBULIN UR ELPH-MCNC: 2.8 GM/DL
GLUCOSE SERPL-MCNC: 261 MG/DL (ref 65–99)
HCT VFR BLD AUTO: 39.1 % (ref 37.5–51)
HGB BLD-MCNC: 12.6 G/DL (ref 13–17.7)
HOLD SPECIMEN: NORMAL
HOLD SPECIMEN: NORMAL
IMM GRANULOCYTES # BLD AUTO: 0.04 10*3/MM3 (ref 0–0.05)
IMM GRANULOCYTES NFR BLD AUTO: 0.6 % (ref 0–0.5)
LYMPHOCYTES # BLD AUTO: 1.56 10*3/MM3 (ref 0.7–3.1)
LYMPHOCYTES NFR BLD AUTO: 23.5 % (ref 19.6–45.3)
MAGNESIUM SERPL-MCNC: 1.8 MG/DL (ref 1.6–2.4)
MCH RBC QN AUTO: 25.1 PG (ref 26.6–33)
MCHC RBC AUTO-ENTMCNC: 32.2 G/DL (ref 31.5–35.7)
MCV RBC AUTO: 77.9 FL (ref 79–97)
MONOCYTES # BLD AUTO: 0.41 10*3/MM3 (ref 0.1–0.9)
MONOCYTES NFR BLD AUTO: 6.2 % (ref 5–12)
NEUTROPHILS NFR BLD AUTO: 4.32 10*3/MM3 (ref 1.7–7)
NEUTROPHILS NFR BLD AUTO: 65.1 % (ref 42.7–76)
NRBC BLD AUTO-RTO: 0 /100 WBC (ref 0–0.2)
PLATELET # BLD AUTO: 169 10*3/MM3 (ref 140–450)
PMV BLD AUTO: 9.7 FL (ref 6–12)
POTASSIUM SERPL-SCNC: 4.4 MMOL/L (ref 3.5–5.2)
PROT SERPL-MCNC: 6.3 G/DL (ref 6–8.5)
RBC # BLD AUTO: 5.02 10*6/MM3 (ref 4.14–5.8)
SARS-COV-2 RNA PNL SPEC NAA+PROBE: NOT DETECTED
SODIUM SERPL-SCNC: 138 MMOL/L (ref 136–145)
TROPONIN T SERPL-MCNC: <0.01 NG/ML (ref 0–0.03)
WBC # BLD AUTO: 6.63 10*3/MM3 (ref 3.4–10.8)
WHOLE BLOOD HOLD SPECIMEN: NORMAL
WHOLE BLOOD HOLD SPECIMEN: NORMAL

## 2021-09-25 PROCEDURE — 84484 ASSAY OF TROPONIN QUANT: CPT | Performed by: EMERGENCY MEDICINE

## 2021-09-25 PROCEDURE — 87635 SARS-COV-2 COVID-19 AMP PRB: CPT | Performed by: PHYSICIAN ASSISTANT

## 2021-09-25 PROCEDURE — 85025 COMPLETE CBC W/AUTO DIFF WBC: CPT | Performed by: EMERGENCY MEDICINE

## 2021-09-25 PROCEDURE — 99283 EMERGENCY DEPT VISIT LOW MDM: CPT

## 2021-09-25 PROCEDURE — 80053 COMPREHEN METABOLIC PANEL: CPT | Performed by: EMERGENCY MEDICINE

## 2021-09-25 PROCEDURE — 83735 ASSAY OF MAGNESIUM: CPT | Performed by: EMERGENCY MEDICINE

## 2021-09-25 PROCEDURE — 93005 ELECTROCARDIOGRAM TRACING: CPT | Performed by: EMERGENCY MEDICINE

## 2021-09-25 PROCEDURE — 71045 X-RAY EXAM CHEST 1 VIEW: CPT

## 2021-09-25 RX ORDER — SODIUM CHLORIDE 0.9 % (FLUSH) 0.9 %
10 SYRINGE (ML) INJECTION AS NEEDED
Status: DISCONTINUED | OUTPATIENT
Start: 2021-09-25 | End: 2021-09-25 | Stop reason: HOSPADM

## 2021-09-25 NOTE — ED PROVIDER NOTES
Subjective   Patient is a 61-year-old male with extensive past medical history including COPD on nasal cannula at baseline, diabetes, CHF, and coronary artery disease who presents to the emergency department at the request of his PCP for Covid testing. He had a CT of his abdomen done for hernia work-up 11 days ago that caught the bottom of his lungs which showed groundglass opacities. He was asymptomatic at that time. 3 days ago, this was apparently discovered by his PCP who called him and instructed him to be Covid tested. However that day and yesterday, he felt completely fine. This morning, however he woke up and felt completely drained, subjective fever and chills, and this prompted him to present to the emergency department. He denies any chest pain, vomiting, diarrhea, focal abdominal pain, or any other complaints at this time. He has not had to go up on his oxygen use.          Review of Systems   Constitutional: Positive for activity change, fatigue and fever.   HENT: Negative.    Eyes: Negative.    Respiratory: Negative.    Cardiovascular: Negative.    Gastrointestinal: Negative.    Endocrine: Negative.    Genitourinary: Negative.    Musculoskeletal: Negative.    Skin: Negative.    Allergic/Immunologic: Negative.    Neurological: Negative.    Hematological: Negative.    Psychiatric/Behavioral: Negative.        Past Medical History:   Diagnosis Date   • Anxiety    • Arthritis    • Asthma    • Brachial neuritis 1/19/2017   • Cellulitis     left arm and right leg    • Chest pain    • CHF (congestive heart failure) (CMS/[facility], located within [facility])     Patient reported history May 2020    • COPD (chronic obstructive pulmonary disease) (CMS/[facility], located [facility])    • Coronary artery disease     Patient reported CABG , 3 vessel   • Depression    • Diabetes mellitus (CMS/[facility], located [facility])     diagnosed in 1998, checks fsbg 3-4x/day   • Dizziness    • Edema    • Elevated cholesterol    • GERD (gastroesophageal reflux disease)    • H/O chest x-ray 07/04/2015     Mild Left base atelectasis   • H/O echocardiogram 07/05/2015    Normal LVSF. EF of 60-65%. Grade 1 diastolic dysfunction of the LV myocardium. No evidence of pericardial effusion   • H/O exercise stress test    • Hearing loss     No use of hearing aids   • History of fracture     Reported 2 bones in left foot and a finger   • History of herniated intervertebral disc     History of left L5-S1 disc herniation   • History of pneumonia    • History of pneumonia    • Hyperlipidemia    • Hypertension    • Impaired functional mobility, balance, gait, and endurance    • LOM (loss of memory) 1/19/2017   • Lower back pain     Right   • Measles    • MUSE (nonalcoholic steatohepatitis)    • Neuropathy     feet    • EDD treated with BiPAP     BiPAP HS - instructed to bring mask/machine DOS (setting 13 and 5)   • Palpitations    • Pericardial effusion    • Poor dentition     Patient reported missing multiple teeth   • Renal disorder    • Seizure disorder (CMS/HCC)     focal seizures   • SOB (shortness of breath)    • Staph infection     back after sugery at the incision site    • Stroke (CMS/HCC)     x2 most recent , slight weakness in hands occasionaly    • Wears glasses        Allergies   Allergen Reactions   • Ajovy [Fremanezumab-Vfrm] Other (See Comments)     Caused tightness of chest, vomiting, pain in both arms.   • Sulfa Antibiotics Anaphylaxis, Nausea And Vomiting and Delirium   • Invokana [Canagliflozin] Unknown - Low Severity     DKA   • Codeine Nausea And Vomiting     Can only take with Phenergan   • Morphine Unknown - Low Severity     Does not work. It causes pain       Past Surgical History:   Procedure Laterality Date   • BACK SURGERY     • CARDIAC CATHETERIZATION     • CARDIAC CATHETERIZATION N/A 8/11/2017    Procedure: Coronary angiography;  Surgeon: Dallas Kim MD;  Location: [facility] CATH INVASIVE LOCATION;  Service:    • CARDIAC CATHETERIZATION N/A 8/11/2017    Procedure: Left Heart Cath;  Surgeon:  Dallas Hernandez MD;  Location: [facility] CATH INVASIVE LOCATION;  Service:    • CARDIAC CATHETERIZATION N/A 8/11/2017    Procedure: Left ventriculography;  Surgeon: Dallas Hernandez MD;  Location: [facility] CATH INVASIVE LOCATION;  Service:    • CARDIAC CATHETERIZATION      2002 x1 stent,  x1 stent   • CARDIOVASCULAR STRESS TEST  07/03/2017    WITH DR HERNANDEZ AT [facility]   • CARPAL TUNNEL RELEASE Right    • CATARACT EXTRACTION W/ INTRAOCULAR LENS IMPLANT Right 6/12/2017    Procedure: CATARACT PHACO EXTRACTION WITH INTRAOCULAR LENS IMPLANT RIGHT ;  Surgeon: Natalie Cao MD;  Location: [facility] OR;  Service:    • CATARACT EXTRACTION W/ INTRAOCULAR LENS IMPLANT Left 7/10/2017    Procedure: CATARACT PHACO EXTRACTION WITH INTRAOCULAR LENS IMPLANT LEFT;  Surgeon: Natalie Cao MD;  Location: [facility] OR;  Service:    • CHOLECYSTECTOMY     • COLONOSCOPY     • COLONOSCOPY N/A 7/2/2020    Procedure: COLONOSCOPY;  Surgeon: Petra Crowell MD;  Location: [facility] ENDOSCOPY;  Service: Gastroenterology;  Laterality: N/A;  poor prep   • CORONARY ANGIOPLASTY WITH STENT PLACEMENT      X1-LAD   • CORONARY ARTERY BYPASS GRAFT N/A 8/15/2017    Procedure: CORONARY ARTERY BYPASS GRAFTING x 3 UTILIZING THE LEFT INTERNAL MAMMARY ARTERY WITH ENDOSCOPIC VEIN HARVESTING OF THE RIGHT GREATER SAPHENOUS VEIN, HAMLET, LAD ENDARECTOMY;  Surgeon: London Damon MD;  [facility];  Service:    • ENDOSCOPY     • EYE CAPSULOTOMY WITH LASER Right 11/25/2019    Procedure: EYE CAPSULOTOMY WITH LASER RIGHT;  Surgeon: Natalie Cao MD;  Location: [facility] OR;  Service: Ophthalmology   • EYE CAPSULOTOMY WITH LASER Left 12/9/2019    Procedure: EYE CAPSULOTOMY WITH LASER LEFT;  Surgeon: Natalie Cao MD;  Location: [facility] OR;  Service: Ophthalmology   • EYE SURGERY      cataract surgery both eyes   • INTERVENTIONAL RADIOLOGY PROCEDURE Bilateral 12/31/2019    Procedure: Carotid Cerebral Angiogram;  Surgeon: Damian Dubois MD;  Location: [facility] CATH  INVASIVE LOCATION;  Service: Interventional Radiology   • KNEE ARTHROSCOPY Bilateral    • KNEE ARTHROSCOPY Right     Dr Lewis   • KNEE ARTHROSCOPY Left     Dr Jameson   • KNEE MENISCECTOMY Right 10/15/2020    Procedure: Right knee arthroscopy with partial medial and lateral meniscectomy and three compartment chondroplasty.;  Surgeon: South Lewis MD;  Location: [facility];  Service: Orthopedics;  Laterality: Right;   • MOUTH SURGERY      Oral extractions   • NEUROPLASTY / TRANSPOSITION ULNAR NERVE AT ELBOW Left    • OTHER SURGICAL HISTORY      Foraminotomy and discectomy   • PERICARDIAL WINDOW N/A 2017    Procedure: PERICARDIAL WINDOW;  Surgeon: Freeman Phillips MD;  Location: [facility] OR;  Service:        Family History   Problem Relation Age of Onset   • Hypertension Mother    • Arthritis Mother    • Stomach cancer Mother    • Alcohol abuse Father    • Heart disease Other    • Hypertension Other    • Other Other         Neurologic disorder   • Heart attack Other    • Parkinsonism Other    • Stroke Other    • Heart disease Other    • Hypertension Other    • Heart disease Other    • Stroke Other    • Hypertension Other    • Colon cancer Neg Hx        Social History     Socioeconomic History   • Marital status:      Spouse name: Not on file   • Number of children: 1   • Years of education: Not on file   • Highest education level: Not on file   Tobacco Use   • Smoking status: Former Smoker     Packs/day: 1.00     Years: 10.00     Pack years: 10.00     Types: Cigarettes     Quit date: 1998     Years since quittin.7   • Smokeless tobacco: Former User     Types: Snuff     Quit date:    Substance and Sexual Activity   • Alcohol use: Yes     Comment: 3 drinks a year   • Drug use: No   • Sexual activity: Defer           Objective   Physical Exam  Constitutional:       General: He is not in acute distress.     Appearance: Normal appearance.   HENT:      Head: Normocephalic.      Right Ear:  External ear normal.      Left Ear: External ear normal.      Nose: Nose normal.      Mouth/Throat:      Mouth: Mucous membranes are moist.   Eyes:      Extraocular Movements: Extraocular movements intact.   Cardiovascular:      Rate and Rhythm: Normal rate.   Pulmonary:      Effort: Pulmonary effort is normal.      Breath sounds: Wheezing and rhonchi present.   Abdominal:      General: Abdomen is flat.   Musculoskeletal:         General: Normal range of motion.      Cervical back: Normal range of motion.   Skin:     General: Skin is dry.   Neurological:      General: No focal deficit present.      Mental Status: He is alert and oriented to person, place, and time.   Psychiatric:         Mood and Affect: Mood normal.         Behavior: Behavior normal.         Procedures           ED Course  ED Course as of Sep 25 1753   Sat Sep 25, 2021   1709 EKG interpreted by me.  Sinus rhythm.  Rate of 84.  Incomplete right bundle branch block.  No ST segment or T wave changes.  Abnormal EKG    [CG]      ED Course User Index  [CG] Deep Avitia, DO                                           MDM  Number of Diagnoses or Management Options  Viral syndrome  Diagnosis management comments: Patient is a 61-year-old male on nasal cannula at baseline for COPD who presents emergency department concern for COVID-19. He had a CT scan performed 11 days ago that incidentally noted groundglass opacities in his lung bases. He has been symptomatic for 1 day. On arrival, his vital signs are normal. Differential considered included viral URI, including COVID-19. Work-up included basic labs and COVID-19 testing.  Patient remained stable in the emergency department.  Labs and imaging independently reviewed and were within normal limits are nonactionable.  COVID-19 test is negative.  Chest x-ray reveals no focal consolidation.  Likely viral syndrome, possibly false negative on COVID-19 testing.  Will instruct to get outpatient testing through the  drive-through early next week if he remains symptomatic.  He was given return precautions and verbalized understanding.      Final diagnoses:   Viral syndrome       ED Disposition  ED Disposition     ED Disposition Condition Comment    Discharge Stable           Isaura Godoy MD  [address] [place] [id number] [phone]               Medication List      Changed    omeprazole 20 MG capsule  Commonly known as: priLOSEC  Take 1 capsule by mouth Daily.  What changed: when to take this             Gem Blandon PA  09/25/21 1005

## 2021-10-11 NOTE — PROGRESS NOTES
Patient: Denzel Harding    YOB: 1960    Date: 10/12/2021    Primary Care Provider: Isaura Godoy MD    Chief Complaint   Patient presents with   • Follow-up     CT       SUBJECTIVE:    History of present illness:  I saw the patient in the office today as a follow-up consultation for evaluation and treatment of a ventral hernia.  Patient was seen in the office previously at which time he complained of a palpable mass on his right abdominal wall adjacent to his umbilicus.  Patient had a CT scan of the abdomen and pelvis performed 09/22/2021 which did show a fat-containing periumbilical hernia.    He is on oxygen 24 hours a day for lung injury suffered at work.  He has had a previous lung diagnosis is is under the care of Dr. Barajas.    The following portions of the patient's history were reviewed and updated as appropriate: allergies, current medications, past family history, past medical history, past social history, past surgical history and problem list.    Review of Systems   Constitutional: Negative for chills, fever and unexpected weight change.   HENT: Negative for trouble swallowing and voice change.    Eyes: Negative for visual disturbance.   Respiratory: Negative for apnea, cough, chest tightness, shortness of breath and wheezing.    Cardiovascular: Negative for chest pain, palpitations and leg swelling.   Gastrointestinal: Positive for abdominal pain. Negative for abdominal distention, anal bleeding, blood in stool, constipation, diarrhea, nausea, rectal pain and vomiting.   Endocrine: Negative for cold intolerance and heat intolerance.   Genitourinary: Negative for difficulty urinating, dysuria, flank pain, scrotal swelling and testicular pain.   Musculoskeletal: Negative for back pain, gait problem and joint swelling.   Skin: Negative for color change, rash and wound.   Neurological: Negative for dizziness, syncope, speech difficulty, weakness, numbness and headaches.   Hematological:  Negative for adenopathy. Does not bruise/bleed easily.   Psychiatric/Behavioral: Negative for confusion. The patient is not nervous/anxious.        History:  Past Medical History:   Diagnosis Date   • Anxiety    • Arthritis    • Asthma    • Brachial neuritis 1/19/2017   • Cellulitis     left arm and right leg    • Chest pain    • CHF (congestive heart failure) ([facility])     Patient reported history May 2020    • COPD (chronic obstructive pulmonary disease) ([facility])    • Coronary artery disease     Patient reported CABG , 3 vessel   • Depression    • Diabetes mellitus ([facility])     diagnosed in 1998, checks fsbg 3-4x/day   • Dizziness    • Edema    • Elevated cholesterol    • GERD (gastroesophageal reflux disease)    • H/O chest x-ray 07/04/2015    Mild Left base atelectasis   • H/O echocardiogram 07/05/2015    Normal LVSF. EF of 60-65%. Grade 1 diastolic dysfunction of the LV myocardium. No evidence of pericardial effusion   • H/O exercise stress test    • Hearing loss     No use of hearing aids   • History of fracture     Reported 2 bones in left foot and a finger   • History of herniated intervertebral disc     History of left L5-S1 disc herniation   • History of pneumonia    • History of pneumonia    • Hyperlipidemia    • Hypertension    • Impaired functional mobility, balance, gait, and endurance    • LOM (loss of memory) 1/19/2017   • Lower back pain     Right   • Measles    • MUSE (nonalcoholic steatohepatitis)    • Neuropathy     feet    • EDD treated with BiPAP     BiPAP HS - instructed to bring mask/machine DOS (setting 13 and 5)   • Palpitations    • Pericardial effusion    • Poor dentition     Patient reported missing multiple teeth   • Renal disorder    • Seizure disorder ([facility])     focal seizures   • SOB (shortness of breath)    • Staph infection     back after sugery at the incision site    • Stroke ([facility])     x2 most recent , slight weakness in hands occasionaly    • Wears glasses        Past  Surgical History:   Procedure Laterality Date   • BACK SURGERY     • CARDIAC CATHETERIZATION     • CARDIAC CATHETERIZATION N/A 8/11/2017    Procedure: Coronary angiography;  Surgeon: Dallas Hernandez MD;  Location: [facility] CATH INVASIVE LOCATION;  Service:    • CARDIAC CATHETERIZATION N/A 8/11/2017    Procedure: Left Heart Cath;  Surgeon: Dallas Hernandez MD;  Location: [facility] CATH INVASIVE LOCATION;  Service:    • CARDIAC CATHETERIZATION N/A 8/11/2017    Procedure: Left ventriculography;  Surgeon: Dallas Hernandez MD;  Location: [facility] CATH INVASIVE LOCATION;  Service:    • CARDIAC CATHETERIZATION      2002 x1 stent,  x1 stent   • CARDIOVASCULAR STRESS TEST  07/03/2017    WITH DR HERNANDEZ AT [facility]   • CARPAL TUNNEL RELEASE Right    • CATARACT EXTRACTION W/ INTRAOCULAR LENS IMPLANT Right 6/12/2017    Procedure: CATARACT PHACO EXTRACTION WITH INTRAOCULAR LENS IMPLANT RIGHT ;  Surgeon: Natalie Cao MD;  Location: [facility] OR;  Service:    • CATARACT EXTRACTION W/ INTRAOCULAR LENS IMPLANT Left 7/10/2017    Procedure: CATARACT PHACO EXTRACTION WITH INTRAOCULAR LENS IMPLANT LEFT;  Surgeon: Ntaalie Cao MD;  Location: [facility] OR;  Service:    • CHOLECYSTECTOMY     • COLONOSCOPY     • COLONOSCOPY N/A 7/2/2020    Procedure: COLONOSCOPY;  Surgeon: Petra Crowell MD;  Location: [facility] ENDOSCOPY;  Service: Gastroenterology;  Laterality: N/A;  poor prep   • CORONARY ANGIOPLASTY WITH STENT PLACEMENT      X1-LAD   • CORONARY ARTERY BYPASS GRAFT N/A 8/15/2017    Procedure: CORONARY ARTERY BYPASS GRAFTING x 3 UTILIZING THE LEFT INTERNAL MAMMARY ARTERY WITH ENDOSCOPIC VEIN HARVESTING OF THE RIGHT GREATER SAPHENOUS VEIN, HAMLET, LAD ENDARECTOMY;  Surgeon: London Damon MD;  Location: [facility] OR;  Service:    • ENDOSCOPY     • EYE CAPSULOTOMY WITH LASER Right 11/25/2019    Procedure: EYE CAPSULOTOMY WITH LASER RIGHT;  Surgeon: Natalie Cao MD;  Location: [facility] OR;  Service: Ophthalmology   • EYE CAPSULOTOMY WITH LASER  Left 2019    Procedure: EYE CAPSULOTOMY WITH LASER LEFT;  Surgeon: Natalie Cao MD;  Location:  [facility] OR;  Service: Ophthalmology   • EYE SURGERY      cataract surgery both eyes   • INTERVENTIONAL RADIOLOGY PROCEDURE Bilateral 2019    Procedure: Carotid Cerebral Angiogram;  Surgeon: Damian Dubois MD;  Location:  [facility] CATH INVASIVE LOCATION;  Service: Interventional Radiology   • KNEE ARTHROSCOPY Bilateral    • KNEE ARTHROSCOPY Right     Dr Lewis   • KNEE ARTHROSCOPY Left     Dr Jameson   • KNEE MENISCECTOMY Right 10/15/2020    Procedure: Right knee arthroscopy with partial medial and lateral meniscectomy and three compartment chondroplasty.;  Surgeon: South Lewis MD;  Location:  [facility] OR;  Service: Orthopedics;  Laterality: Right;   • MOUTH SURGERY      Oral extractions   • NEUROPLASTY / TRANSPOSITION ULNAR NERVE AT ELBOW Left    • OTHER SURGICAL HISTORY      Foraminotomy and discectomy   • PERICARDIAL WINDOW N/A 2017    Procedure: PERICARDIAL WINDOW;  Surgeon: Freeman Phillips MD;  Location:  [facility] OR;  Service:        Family History   Problem Relation Age of Onset   • Hypertension Mother    • Arthritis Mother    • Stomach cancer Mother    • Alcohol abuse Father    • Heart disease Other    • Hypertension Other    • Other Other         Neurologic disorder   • Heart attack Other    • Parkinsonism Other    • Stroke Other    • Heart disease Other    • Hypertension Other    • Heart disease Other    • Stroke Other    • Hypertension Other    • Colon cancer Neg Hx        Social History     Tobacco Use   • Smoking status: Former Smoker     Packs/day: 1.00     Years: 10.00     Pack years: 10.00     Types: Cigarettes     Quit date: 1998     Years since quittin.7   • Smokeless tobacco: Former User     Types: Snuff     Quit date:    Substance Use Topics   • Alcohol use: Yes     Comment: 3 drinks a year   • Drug use: No       Allergies:  Allergies   Allergen Reactions   • Ajovy  [Fremanezumab-Vfrm] Other (See Comments)     Caused tightness of chest, vomiting, pain in both arms.   • Sulfa Antibiotics Anaphylaxis, Nausea And Vomiting and Delirium   • Invokana [Canagliflozin] Unknown - Low Severity     DKA   • Codeine Nausea And Vomiting     Can only take with Phenergan   • Morphine Unknown - Low Severity     Does not work. It causes pain       Medications:    Current Outpatient Medications:   •  albuterol sulfate HFA (Ventolin HFA) 108 (90 Base) MCG/ACT inhaler, Inhale 2 puffs Every 4 (Four) Hours As Needed for Wheezing or Shortness of Air., Disp: 8 g, Rfl: 5  •  amitriptyline (ELAVIL) 25 MG tablet, TAKE 3 TABLETS BY MOUTH EVERY NIGHT AT BEDTIME, Disp: 270 tablet, Rfl: 0  •  amoxicillin (AMOXIL) 500 MG capsule, TAKE 1 CAPSULE BY MOUTH THREE TIMES DAILY UNTIL GONE, Disp: , Rfl:   •  aspirin  MG tablet, Take 1 tablet by mouth Daily., Disp: 30 tablet, Rfl: 0  •  atorvastatin (LIPITOR) 80 MG tablet, Take 1 tablet by mouth Every Night., Disp: 90 tablet, Rfl: 1  •  azelastine (ASTELIN) 0.1 % nasal spray, 1 spray into the nostril(s) as directed by provider 2 (Two) Times a Day As Needed for Rhinitis or Allergies. Use in each nostril as directed, Disp: 1 each, Rfl: 5  •  B-D ULTRAFINE III SHORT PEN 31G X 8 MM misc, 2 (Two) Times a Day., Disp: , Rfl:   •  budesonide-formoterol (Symbicort) 80-4.5 MCG/ACT inhaler, Inhale 2 puffs 2 (Two) Times a Day. Rinse mouth with water after use., Disp: 1 each, Rfl: 3  •  ciprofloxacin (CILOXAN) 0.3 % ophthalmic solution, INSTILL 5 DROPS INTO LEFT EAR TWICE DAILY FOR 7 DAYS, Disp: , Rfl:   •  clopidogrel (PLAVIX) 75 MG tablet, Take 1 tablet by mouth Daily., Disp: 90 tablet, Rfl: 1  •  coenzyme Q10 100 MG capsule, Take 100 mg by mouth Daily., Disp: , Rfl:   •  docusate sodium (Colace) 100 MG capsule, Take 1 capsule by mouth 2 (Two) Times a Day As Needed for Constipation., Disp: 60 capsule, Rfl: 3  •  Dupixent 300 MG/2ML solution prefilled syringe, INJECT 300 MG  SUBCUTANEOUSLY EVERY OTHER WEEK, Disp: 4 each, Rfl: 4  •  gabapentin (NEURONTIN) 600 MG tablet, Take 1 tablet by mouth 3 (Three) Times a Day., Disp: 270 tablet, Rfl: 3  •  HumuLIN R U-500 KwikPen 500 UNIT/ML solution pen-injector CONCENTRATED injection, Inject 190 Units under the skin into the appropriate area as directed Every Morning. Takes around 9AM, Disp: , Rfl:   •  ibuprofen (ADVIL,MOTRIN) 800 MG tablet, Take 800 mg by mouth., Disp: , Rfl:   •  Insulin Regular Human, Conc, (HumuLIN R U-500 KwikPen) 500 UNIT/ML solution pen-injector CONCENTRATED injection, Inject 180 Units under the skin into the appropriate area as directed Every Evening. Takes at 3PM, Disp: , Rfl:   •  Insulin Regular Human, Conc, (HumuLIN R U-500 KwikPen) 500 UNIT/ML solution pen-injector CONCENTRATED injection, Inject 180 Units under the skin into the appropriate area as directed Every Night. Patient takes at 8PM, Disp: , Rfl:   •  ipratropium-albuterol (DUO-NEB) 0.5-2.5 mg/3 ml nebulizer, Take 3 mL by nebulization 4 (Four) Times a Day As Needed for Wheezing or Shortness of Air., Disp: 360 mL, Rfl: 5  •  lamoTRIgine (LaMICtal) 100 MG tablet, Take 100 mg by mouth 2 (Two) Times a Day., Disp: , Rfl:   •  Lancets (OneTouch Delica Plus [id number]) misc, , Disp: , Rfl:   •  levETIRAcetam (KEPPRA) 1000 MG tablet, Take 1,000 mg by mouth 2 (Two) Times a Day., Disp: , Rfl:   •  levocetirizine (XYZAL) 5 MG tablet, Take 1 tablet by mouth Every Evening., Disp: 90 tablet, Rfl: 3  •  lisinopril (PRINIVIL,ZESTRIL) 5 MG tablet, Take 1 tablet by mouth Daily., Disp: 90 tablet, Rfl: 1  •  meclizine (ANTIVERT) 25 MG tablet, Take 1 tablet by mouth 3 (Three) Times a Day As Needed for dizziness., Disp: 10 tablet, Rfl: 0  •  Nebulizer device, 1 Device Take As Directed., Disp: 1 each, Rfl: 0  •  omeprazole (priLOSEC) 20 MG capsule, Take 1 capsule by mouth Daily. (Patient taking differently: Take 20 mg by mouth Every Night.), Disp: 90 capsule, Rfl: 0  •  OneTouch  "Verio test strip, , Disp: , Rfl:   •  predniSONE (DELTASONE) 10 MG tablet, Take 4 tablets by mouth daily for 4 days, then take 3 tablets daily for 3 days, then take 2 tablets daily for 2 days, then take 1 tablet daily for 2 days, Disp: 31 tablet, Rfl: 0  •  promethazine (PHENERGAN) 25 MG tablet, Take 1 tablet by mouth Every 6 (Six) Hours As Needed for Nausea or Vomiting., Disp: 20 tablet, Rfl: 0  •  Semaglutide, 1 MG/DOSE, (Ozempic, 1 MG/DOSE,) 2 MG/1.5ML solution pen-injector, Inject 1 mg under the skin into the appropriate area as directed 1 (One) Time Per Week., Disp: 9 mL, Rfl: 3  •  spironolactone (ALDACTONE) 25 MG tablet, Take 1 tablet by mouth Daily., Disp: 90 tablet, Rfl: 1  •  Tiotropium Bromide Monohydrate (Spiriva Respimat) 1.25 MCG/ACT aerosol solution inhaler, Inhale 2 puffs Daily., Disp: 4 g, Rfl: 5  •  TURMERIC PO, Take 500 mg by mouth 2 (Two) Times a Day., Disp: , Rfl:   •  vitamin C (ASCORBIC ACID) 500 MG tablet, Take 500 mg by mouth Daily., Disp: , Rfl:   •  vitamin D3 125 MCG (5000 UT) capsule capsule, Take 5,000 Units by mouth Daily., Disp: , Rfl:   •  vitamin E 400 UNIT capsule, Take 1 capsule by mouth 2 (two) times a day., Disp: 60 capsule, Rfl: 5  •  Vortioxetine HBr (Trintellix) 20 MG tablet, Take 20 mg by mouth Daily., Disp: 90 tablet, Rfl: 0  •  zafirlukast (Accolate) 20 MG tablet, Take 1 tablet by mouth 2 (Two) Times a Day., Disp: 60 tablet, Rfl: 5  •  isosorbide mononitrate (IMDUR) 60 MG 24 hr tablet, Take 1 tablet by mouth Daily, Disp: 30 tablet, Rfl: 0  •  metoprolol succinate XL (TOPROL-XL) 200 MG 24 hr tablet, Take 1 tablet by mouth Daily, Disp: 90 tablet, Rfl: 2    OBJECTIVE:    Vital Signs:   Vitals:    10/12/21 1412   BP: 118/64   Pulse: 87   Resp: 18   Temp: 98.6 °F (37 °C)   TempSrc: Temporal   SpO2: 98%   Weight: 123 kg (272 lb)   Height: 177.8 cm (70\")       Physical Exam:   General Appearance:    Alert, cooperative, in no acute distress   Head:    Normocephalic, without " obvious abnormality, atraumatic   Eyes:            Lids and lashes normal, conjunctivae and sclerae normal, no   icterus, no pallor, corneas clear, PERRLA   Ears:    Ears appear intact with no abnormalities noted   Throat:   No oral lesions, no thrush, oral mucosa moist   Neck:   No adenopathy, supple, trachea midline, no thyromegaly, no   carotid bruit, no JVD   Lungs:     Clear to auscultation,respirations regular, even and                  unlabored    Heart:    Regular rhythm and normal rate, normal S1 and S2, no            murmur   Abdomen:     no masses, no organomegaly, soft non-tender, non-distended, no guarding, there is evidence of a tenderness at the umbilicus but no evidence of erythema   Extremities:   Moves all extremities well, no edema, no cyanosis, no             redness   Pulses:   Pulses palpable and equal bilaterally   Skin:   No bleeding, bruising or rash   Lymph nodes:   No palpable adenopathy   Neurologic:   Cranial nerves 2 - 12 grossly intact, sensation intact        Results Review:   I reviewed the patient's new clinical results.  I reviewed the patient's new imaging results and agree with the interpretation.  I reviewed the patient's other test results and agree with the interpretation    Review of Systems was reviewed and confirmed as accurate as documented by the MA.    ASSESSMENT/PLAN:    1. Umbilical hernia with obstruction, without gangrene        I had a detailed and extensive discussion with the patient and his wife in the office today and I do believe he has a small umbilical hernia but because of his extensive pulmonary disease I do not think surgical intervention is warranted at this time.  Certainly if this gets worse he needs to see me back, if he ever needs surgical intervention he will need clearance from the pulmonary service first.  I discussed the patients findings and my recommendations with patient.    Electronically signed by Torres Kim MD  10/13/21

## 2021-10-12 ENCOUNTER — OFFICE VISIT (OUTPATIENT)
Dept: SURGERY | Facility: CLINIC | Age: 61
End: 2021-10-12

## 2021-10-12 VITALS
TEMPERATURE: 98.6 F | HEART RATE: 87 BPM | RESPIRATION RATE: 18 BRPM | WEIGHT: 272 LBS | DIASTOLIC BLOOD PRESSURE: 64 MMHG | BODY MASS INDEX: 38.94 KG/M2 | OXYGEN SATURATION: 98 % | SYSTOLIC BLOOD PRESSURE: 118 MMHG | HEIGHT: 70 IN

## 2021-10-12 DIAGNOSIS — K42.0 UMBILICAL HERNIA WITH OBSTRUCTION, WITHOUT GANGRENE: Primary | ICD-10-CM

## 2021-10-12 PROCEDURE — 99213 OFFICE O/P EST LOW 20 MIN: CPT | Performed by: SURGERY

## 2021-10-12 RX ORDER — BLOOD SUGAR DIAGNOSTIC
STRIP MISCELLANEOUS
COMMUNITY
Start: 2021-10-07 | End: 2022-01-26

## 2021-10-12 RX ORDER — LANCETS 33 GAUGE
EACH MISCELLANEOUS
COMMUNITY
Start: 2021-10-06 | End: 2022-01-26

## 2021-11-01 ENCOUNTER — OFFICE VISIT (OUTPATIENT)
Dept: CARDIOLOGY | Facility: CLINIC | Age: 61
End: 2021-11-01

## 2021-11-01 VITALS
SYSTOLIC BLOOD PRESSURE: 104 MMHG | DIASTOLIC BLOOD PRESSURE: 66 MMHG | OXYGEN SATURATION: 98 % | HEART RATE: 87 BPM | HEIGHT: 70 IN | BODY MASS INDEX: 38.94 KG/M2 | WEIGHT: 272 LBS

## 2021-11-01 DIAGNOSIS — I10 ESSENTIAL HYPERTENSION: ICD-10-CM

## 2021-11-01 DIAGNOSIS — I25.10 CORONARY ARTERIOSCLEROSIS IN NATIVE ARTERY: ICD-10-CM

## 2021-11-01 DIAGNOSIS — I50.32 CHRONIC DIASTOLIC CHF (CONGESTIVE HEART FAILURE) (HCC): Primary | ICD-10-CM

## 2021-11-01 DIAGNOSIS — E78.00 HYPERCHOLESTEROLEMIA: ICD-10-CM

## 2021-11-01 PROCEDURE — 99214 OFFICE O/P EST MOD 30 MIN: CPT | Performed by: INTERNAL MEDICINE

## 2021-11-01 RX ORDER — ISOSORBIDE MONONITRATE 60 MG/1
60 TABLET, EXTENDED RELEASE ORAL
Qty: 90 TABLET | Refills: 3 | Status: ON HOLD | OUTPATIENT
Start: 2021-11-01 | End: 2022-01-01

## 2021-11-01 NOTE — PROGRESS NOTES
[facility]  Office visit  Denzel Harding  1960  [phone]    VISIT DATE:  11/1/2021      PCP: Isaura Godoy MD  [address] [place] [id number]    CC:  Chief Complaint   Patient presents with   • Coronary arteriosclerosis in native artery       PROBLEM LIST:  1. Post op pericarditis with effusion s/p left anterior thoracotomy and pericardial window 8/25/17  2. CABG x 3, LAD endarterectomy 8/15/17 Dr. Damon, Normal EF, DHF  3. HTN: Controlled, BB and Norvasc  4. CVA: Remote  5. DM  6. COPD/EDD  7. HLD    Previous cardiac studies and procedures:  August 2017 cardiac catheterization  · Severe three-vessel coronary artery disease  · Preserved global and regional left ventricular systolic function  · Elevated left ventricular filling pressures consistent with diastolic heart failure.  · No significant left-sided valvular abnormalities appreciated    December 2017  Echo  · Left ventricular systolic function is normal.  · Left ventricular diastolic dysfunction (grade I a) consistent with impaired relaxation.  · Left ventricular wall thickness is consistent with mild concentric hypertrophy.  Carotid duplex  · Proximal right internal carotid artery plaque without significant stenosis.  · Right internal carotid artery stenosis of 0-49%.  · Proximal left internal carotid artery plaque without significant stenosis.  · Left internal carotid artery stenosis of 0-49%.    December 2018 myocardial perfusion imaging  · Left ventricular ejection fraction is borderline normal (Calculated EF = 50%).  · Myocardial perfusion imaging indicates a normal myocardial perfusion study with no evidence of ischemia.    6/2019 echo  Technically very limited study   1) Mild LVH with normal LV systolic function ( EF is over 55%)  2) Mild left atrial enlargement   3) Trace MR and TR   4) Normal RV function       ASSESSMENT:   Diagnosis Plan   1. Chronic diastolic CHF (congestive heart failure) (HCC)   "   2. Coronary arteriosclerosis in native artery     3. Essential hypertension     4. Hypercholesterolemia         PLAN:  Coronary artery disease: Normal EF Stable, status post surgical revascularization.  Continue aspirin, high intensity statin therapy and afterload reduction    History of postop pericarditis status post pericardial window    Hypertension: goal less than 130/80.  Potential intermittent symptomatic relative hypotension.  Instructed move his lisinopril to bedtime dosing, otherwise continue current medical therapy.    Hyperlipidemia: Continue high intensity statin therapy, goal LDL less than 100    Congestive heart failure, chronic, diastolic: Currently euvolemic and compensated.  Continue current medical therapy.    Hyperlipidemia: Goal LDL less than 70.  Continue atorvastatin 80 mg by mouth daily and predominant plant-based diet.    Subjective  In a wheelchair today.  On supplemental oxygen.  Reporting increased episodes of chest discomfort after he ran out of his prescription of Imdur.  Blood pressures running less than 120/80 mmHg.    PHYSICAL EXAMINATION:  Vitals:    11/01/21 1123   BP: 104/66   BP Location: Left arm   Patient Position: Sitting   Pulse: 87   SpO2: 98%   Weight: 123 kg (272 lb)   Height: 177.8 cm (70\")     General Appearance:    Alert, cooperative, no distress, appears stated age   Head:    Normocephalic, without obvious abnormality, atraumatic   Eyes:    conjunctiva/corneas clear   Nose:   Nares normal, septum midline, mucosa normal, no drainage   Throat:   Lips, teeth and gums normal   Neck:   Supple, symmetrical, trachea midline, no carotid    bruit or JVD   Lungs:    Diminished at the bases bilaterally, respirations unlabored   Chest Wall:    No tenderness or deformity    Heart:    Regular rate and rhythm, S1 and S2 normal, no murmur, rub   or gallop, normal carotid impulse bilaterally without bruit.   Abdomen:     Soft, non-tender   Extremities:   Extremities normal, " atraumatic, no cyanosis, trivial bilateral pretibial edema   Pulses:   2+ and symmetric all extremities   Skin:   Skin color, texture, turgor normal, no rashes or lesions       Diagnostic Data:  Procedures  Lab Results   Component Value Date    CHLPL 123 07/01/2021    TRIG 163 (H) 07/01/2021    HDL 27 (L) 07/01/2021     Lab Results   Component Value Date    GLUCOSE 261 (H) 09/25/2021    BUN 8 09/25/2021    CREATININE 0.67 (L) 09/25/2021     09/25/2021    K 4.4 09/25/2021     09/25/2021    CO2 27.6 09/25/2021     Lab Results   Component Value Date    HGBA1C 9.90 (H) 08/10/2021     Lab Results   Component Value Date    WBC 6.63 09/25/2021    HGB 12.6 (L) 09/25/2021    HCT 39.1 09/25/2021     09/25/2021       Allergies  Allergies   Allergen Reactions   • Ajovy [Fremanezumab-Vfrm] Other (See Comments)     Caused tightness of chest, vomiting, pain in both arms.   • Sulfa Antibiotics Anaphylaxis, Nausea And Vomiting and Delirium   • Invokana [Canagliflozin] Unknown - Low Severity     DKA   • Codeine Nausea And Vomiting     Can only take with Phenergan   • Morphine Unknown - Low Severity     Does not work. It causes pain       Current Medications    Current Outpatient Medications:   •  albuterol sulfate HFA (Ventolin HFA) 108 (90 Base) MCG/ACT inhaler, Inhale 2 puffs Every 4 (Four) Hours As Needed for Wheezing or Shortness of Air., Disp: 8 g, Rfl: 5  •  amitriptyline (ELAVIL) 25 MG tablet, TAKE 3 TABLETS BY MOUTH EVERY NIGHT AT BEDTIME, Disp: 270 tablet, Rfl: 0  •  amoxicillin (AMOXIL) 500 MG capsule, TAKE 1 CAPSULE BY MOUTH THREE TIMES DAILY UNTIL GONE, Disp: , Rfl:   •  aspirin  MG tablet, Take 1 tablet by mouth Daily., Disp: 30 tablet, Rfl: 0  •  atorvastatin (LIPITOR) 80 MG tablet, Take 1 tablet by mouth Every Night., Disp: 90 tablet, Rfl: 1  •  azelastine (ASTELIN) 0.1 % nasal spray, 1 spray into the nostril(s) as directed by provider 2 (Two) Times a Day As Needed for Rhinitis or Allergies.  Use in each nostril as directed, Disp: 1 each, Rfl: 5  •  B-D ULTRAFINE III SHORT PEN 31G X 8 MM misc, 2 (Two) Times a Day., Disp: , Rfl:   •  budesonide-formoterol (Symbicort) 80-4.5 MCG/ACT inhaler, Inhale 2 puffs 2 (Two) Times a Day. Rinse mouth with water after use., Disp: 1 each, Rfl: 3  •  ciprofloxacin (CILOXAN) 0.3 % ophthalmic solution, INSTILL 5 DROPS INTO LEFT EAR TWICE DAILY FOR 7 DAYS, Disp: , Rfl:   •  clopidogrel (PLAVIX) 75 MG tablet, Take 1 tablet by mouth Daily., Disp: 90 tablet, Rfl: 1  •  coenzyme Q10 100 MG capsule, Take 100 mg by mouth Daily., Disp: , Rfl:   •  docusate sodium (Colace) 100 MG capsule, Take 1 capsule by mouth 2 (Two) Times a Day As Needed for Constipation., Disp: 60 capsule, Rfl: 3  •  Dupixent 300 MG/2ML solution prefilled syringe, INJECT 300 MG SUBCUTANEOUSLY EVERY OTHER WEEK, Disp: 4 each, Rfl: 4  •  gabapentin (NEURONTIN) 600 MG tablet, Take 1 tablet by mouth 3 (Three) Times a Day., Disp: 270 tablet, Rfl: 3  •  HumuLIN R U-500 KwikPen 500 UNIT/ML solution pen-injector CONCENTRATED injection, Inject 190 Units under the skin into the appropriate area as directed Every Morning. Takes around 9AM, Disp: , Rfl:   •  ibuprofen (ADVIL,MOTRIN) 800 MG tablet, Take 800 mg by mouth., Disp: , Rfl:   •  Insulin Regular Human, Conc, (HumuLIN R U-500 KwikPen) 500 UNIT/ML solution pen-injector CONCENTRATED injection, Inject 180 Units under the skin into the appropriate area as directed Every Evening. Takes at 3PM, Disp: , Rfl:   •  Insulin Regular Human, Conc, (HumuLIN R U-500 KwikPen) 500 UNIT/ML solution pen-injector CONCENTRATED injection, Inject 180 Units under the skin into the appropriate area as directed Every Night. Patient takes at 8PM, Disp: , Rfl:   •  ipratropium-albuterol (DUO-NEB) 0.5-2.5 mg/3 ml nebulizer, Take 3 mL by nebulization 4 (Four) Times a Day As Needed for Wheezing or Shortness of Air., Disp: 360 mL, Rfl: 5  •  isosorbide mononitrate (IMDUR) 60 MG 24 hr tablet, Take  1 tablet by mouth Daily, Disp: 30 tablet, Rfl: 0  •  lamoTRIgine (LaMICtal) 100 MG tablet, Take 100 mg by mouth 2 (Two) Times a Day., Disp: , Rfl:   •  Lancets (OneTouch Delica Plus [id number]) misc, , Disp: , Rfl:   •  levETIRAcetam (KEPPRA) 1000 MG tablet, Take 1,000 mg by mouth 2 (Two) Times a Day., Disp: , Rfl:   •  levocetirizine (XYZAL) 5 MG tablet, Take 1 tablet by mouth Every Evening., Disp: 90 tablet, Rfl: 3  •  lisinopril (PRINIVIL,ZESTRIL) 5 MG tablet, Take 1 tablet by mouth Daily., Disp: 90 tablet, Rfl: 1  •  meclizine (ANTIVERT) 25 MG tablet, Take 1 tablet by mouth 3 (Three) Times a Day As Needed for dizziness., Disp: 10 tablet, Rfl: 0  •  metoprolol succinate XL (TOPROL-XL) 200 MG 24 hr tablet, Take 1 tablet by mouth Daily, Disp: 90 tablet, Rfl: 2  •  Nebulizer device, 1 Device Take As Directed., Disp: 1 each, Rfl: 0  •  omeprazole (priLOSEC) 20 MG capsule, Take 1 capsule by mouth Daily. (Patient taking differently: Take 20 mg by mouth Every Night.), Disp: 90 capsule, Rfl: 0  •  OneTouch Verio test strip, , Disp: , Rfl:   •  predniSONE (DELTASONE) 10 MG tablet, Take 4 tablets by mouth daily for 4 days, then take 3 tablets daily for 3 days, then take 2 tablets daily for 2 days, then take 1 tablet daily for 2 days, Disp: 31 tablet, Rfl: 0  •  promethazine (PHENERGAN) 25 MG tablet, Take 1 tablet by mouth Every 6 (Six) Hours As Needed for Nausea or Vomiting., Disp: 20 tablet, Rfl: 0  •  Semaglutide, 1 MG/DOSE, (Ozempic, 1 MG/DOSE,) 2 MG/1.5ML solution pen-injector, Inject 1 mg under the skin into the appropriate area as directed 1 (One) Time Per Week., Disp: 9 mL, Rfl: 3  •  spironolactone (ALDACTONE) 25 MG tablet, Take 1 tablet by mouth Daily., Disp: 90 tablet, Rfl: 1  •  Tiotropium Bromide Monohydrate (Spiriva Respimat) 1.25 MCG/ACT aerosol solution inhaler, Inhale 2 puffs Daily., Disp: 4 g, Rfl: 5  •  TURMERIC PO, Take 500 mg by mouth 2 (Two) Times a Day., Disp: , Rfl:   •  vitamin C (ASCORBIC ACID) 500  MG tablet, Take 500 mg by mouth Daily., Disp: , Rfl:   •  vitamin D3 125 MCG (5000 UT) capsule capsule, Take 5,000 Units by mouth Daily., Disp: , Rfl:   •  vitamin E 400 UNIT capsule, Take 1 capsule by mouth 2 (two) times a day., Disp: 60 capsule, Rfl: 5  •  Vortioxetine HBr (Trintellix) 20 MG tablet, Take 20 mg by mouth Daily., Disp: 90 tablet, Rfl: 0  •  zafirlukast (Accolate) 20 MG tablet, Take 1 tablet by mouth 2 (Two) Times a Day., Disp: 60 tablet, Rfl: 5          ROS  Review of Systems   Cardiovascular: Positive for dyspnea on exertion, irregular heartbeat and leg swelling. Negative for chest pain and palpitations.   Respiratory: Positive for shortness of breath, sleep disturbances due to breathing, snoring, sputum production and wheezing. Negative for cough.    Neurological: Positive for dizziness.       SOCIAL HX  Social History     Socioeconomic History   • Marital status:    • Number of children: 1   Tobacco Use   • Smoking status: Former Smoker     Packs/day: 1.00     Years: 10.00     Pack years: 10.00     Types: Cigarettes     Quit date: 1998     Years since quittin.8   • Smokeless tobacco: Former User     Types: Snuff     Quit date:    Substance and Sexual Activity   • Alcohol use: Yes     Comment: 3 drinks a year   • Drug use: No   • Sexual activity: Defer       FAMILY HX  Family History   Problem Relation Age of Onset   • Hypertension Mother    • Arthritis Mother    • Stomach cancer Mother    • Alcohol abuse Father    • Heart disease Other    • Hypertension Other    • Other Other         Neurologic disorder   • Heart attack Other    • Parkinsonism Other    • Stroke Other    • Heart disease Other    • Hypertension Other    • Heart disease Other    • Stroke Other    • Hypertension Other    • Colon cancer Neg Hx              London Jiménez III, MD, [facility]

## 2021-11-04 ENCOUNTER — READMISSION MANAGEMENT (OUTPATIENT)
Dept: CALL CENTER | Facility: HOSPITAL | Age: 61
End: 2021-11-04

## 2021-11-04 NOTE — OUTREACH NOTE
Prep Survey      Responses   [facility] facility patient discharged from? Non-BH   Is LACE score < 7 ? Non-BH Discharge   Emergency Room discharge w/ pulse ox? No   Eligibility [facility]   Date of Discharge 11/04/21   Discharge Disposition [facility]   Discharge diagnosis Unavailable   Does the patient have one of the following disease processes/diagnoses(primary or secondary)? Other   Prep survey completed? Yes          Alyssa Yung RN

## 2021-11-05 ENCOUNTER — TRANSITIONAL CARE MANAGEMENT TELEPHONE ENCOUNTER (OUTPATIENT)
Dept: CALL CENTER | Facility: HOSPITAL | Age: 61
End: 2021-11-05

## 2021-11-05 NOTE — OUTREACH NOTE
Call Center TCM Note      Responses   [facility] patient discharged from? Non-   Does the patient have one of the following disease processes/diagnoses(primary or secondary)? Other   TCM attempt successful? Yes   Call start time 0905   Call end time 0924   Discharge diagnosis Fallen 3X   Person spoke with today (if not patient) and relationship Spouse   Meds reviewed with patient/caregiver? Yes   Is the patient having any side effects they believe may be caused by any medication additions or changes? No   Does the patient have all medications ordered at discharge? N/A   Is the patient taking all medications as directed (includes completed medication regime)? Yes   Does the patient have a primary care provider?  Yes   Does the patient have an appointment with their PCP within 7 days of discharge? Yes   Comments regarding PCP hospital fu appt on 11/9/21 at 9:45 AM   Has the patient kept scheduled appointments due by today? N/A   What DME was ordered? RW   Has all DME been delivered? Yes   Psychosocial issues? No   Did the patient receive a copy of their discharge instructions? Yes   Nursing interventions Reviewed instructions with patient   What is the patient's perception of their health status since discharge? Improving   Is the patient/caregiver able to teach back signs and symptoms related to disease process for when to call PCP? Yes   Is the patient/caregiver able to teach back signs and symptoms related to disease process for when to call 911? Yes   Is the patient/caregiver able to teach back the hierarchy of who to call/visit for symptoms/problems? PCP, Specialist, Home health nurse, Urgent Care, ED, 911 Yes   If the patient is a current smoker, are they able to teach back resources for cessation? Not a smoker   TCM call completed? Yes   Wrap up additional comments Spouse states pt is about the same,  reports pt's experience was not good at [facility]. Spouse shares MD at [facility] was going  to order PT for pt and that did not happen. Spouse feels pt needs PT as he has a hard time walking, and mobility is compromised r/t weakness. Spouse advised to consult PCP about HH PT then transition to OP PT since pt had fell 3X r/t leg weakness. Spouse verified PCP hospital fu appt on 11/9/21. Questions/concerns addressed.          Teresita Baez RN    11/5/2021, 09:28 EDT

## 2021-11-09 ENCOUNTER — OFFICE VISIT (OUTPATIENT)
Dept: INTERNAL MEDICINE | Facility: CLINIC | Age: 61
End: 2021-11-09

## 2021-11-09 VITALS
TEMPERATURE: 98 F | OXYGEN SATURATION: 98 % | HEART RATE: 81 BPM | RESPIRATION RATE: 16 BRPM | BODY MASS INDEX: 39.37 KG/M2 | SYSTOLIC BLOOD PRESSURE: 106 MMHG | DIASTOLIC BLOOD PRESSURE: 69 MMHG | WEIGHT: 275 LBS | HEIGHT: 70 IN

## 2021-11-09 DIAGNOSIS — Z23 NEED FOR COVID-19 VACCINE: ICD-10-CM

## 2021-11-09 DIAGNOSIS — Z79.4 TYPE 2 DIABETES MELLITUS WITH HYPERGLYCEMIA, WITH LONG-TERM CURRENT USE OF INSULIN (HCC): ICD-10-CM

## 2021-11-09 DIAGNOSIS — I10 BENIGN ESSENTIAL HYPERTENSION: Primary | ICD-10-CM

## 2021-11-09 DIAGNOSIS — E78.2 MIXED HYPERLIPIDEMIA: ICD-10-CM

## 2021-11-09 DIAGNOSIS — S80.811A LEG ABRASION, RIGHT, INITIAL ENCOUNTER: ICD-10-CM

## 2021-11-09 DIAGNOSIS — E11.65 TYPE 2 DIABETES MELLITUS WITH HYPERGLYCEMIA, WITH LONG-TERM CURRENT USE OF INSULIN (HCC): ICD-10-CM

## 2021-11-09 PROCEDURE — 99496 TRANSJ CARE MGMT HIGH F2F 7D: CPT | Performed by: INTERNAL MEDICINE

## 2021-11-09 PROCEDURE — 91300 COVID-19 (PFIZER): CPT | Performed by: INTERNAL MEDICINE

## 2021-11-09 PROCEDURE — 1111F DSCHRG MED/CURRENT MED MERGE: CPT | Performed by: INTERNAL MEDICINE

## 2021-11-09 PROCEDURE — 0003A COVID-19 (PFIZER): CPT | Performed by: INTERNAL MEDICINE

## 2021-11-09 RX ORDER — MUPIROCIN CALCIUM 20 MG/G
1 CREAM TOPICAL 3 TIMES DAILY
Qty: 15 G | Refills: 2 | Status: SHIPPED | OUTPATIENT
Start: 2021-11-09 | End: 2022-01-01

## 2021-11-09 NOTE — PROGRESS NOTES
Transitional Care Follow Up Visit  Subjective     Denzel Harding is a 61 y.o. male who presents for a transitional care management visit.    Within 48 business hours after discharge our office contacted him via telephone to coordinate his care and needs.      I reviewed and discussed the details of that call along with the discharge summary, hospital problems, inpatient lab results, inpatient diagnostic studies, and consultation reports with [facility].     Current outpatient and discharge medications have been reconciled for the patient.  Reviewed by: Isaura Godoy MD      Date of TCM Phone Call 11/4/2021   [facility]   Date of Admission -   Date of Discharge 11/4/2021   Discharge Disposition [facility]-Health [facility]     Risk for Readmission (LACE) No data recorded    Chief Complaint   Patient presents with   • Follow-up     [facility] admit 11/1/2021 discharge 11/4/2021   • Hypertension   • Hyperlipidemia   • Diabetes       History of Present Illness   Course During Hospital Stay:   The patient is also here to follow up on hospitalization at [facility] . Admitted on 11/1/2021 and discharged on 11-4-2021 after falling 3 times on the day of admission  ,his   medications reconciled and updated .  Patient is here to follow up on the blood pressure  The patient is taking the blood pressure medications as prescribed and has had no side effects. The patient is also here to follow up on the cholesterol and is trying to follow a diet. The patient is  also here to follow up on  Sugar and had lab work done .  The patient also needs refills on medications .  He needs the covid booster , he was seen at [facility] endocrinology and cardiology office on the day of admission .  He complains of Right leg- resolving abrasion  Hyperlipidemia   Pertinent negatives include no chest pain or shortness of breath.   Hypertension   Pertinent negatives include no chest pain, palpitations or shortness of breath.       The following  portions of the patient's history were reviewed and updated as appropriate: allergies, current medications, past family history, past medical history, past social history, past surgical history and problem list.    Review of Systems   Constitutional: Negative for appetite change, fatigue and fever.   HENT: Negative for congestion, ear discharge, ear pain, sinus pressure and sore throat.    Eyes: Negative for pain and discharge.   Respiratory: Negative for cough, shortness of breath and wheezing.    Cardiovascular: Negative for chest pain, palpitations and leg swelling.   Gastrointestinal: Negative for abdominal pain, constipation, diarrhea, nausea and vomiting.   Endocrine: Negative for cold intolerance and heat intolerance.   Genitourinary: Negative for dysuria and flank pain.   Musculoskeletal: Negative for arthralgias and joint swelling.   Skin: Negative for pallor and rash.        Resolving right leg abrasion   Allergic/Immunologic: Negative for environmental allergies and food allergies.   Neurological: Negative for dizziness, weakness and numbness.   Hematological: Negative for adenopathy. Does not bruise/bleed easily.   Psychiatric/Behavioral: Negative for behavioral problems and dysphoric mood. The patient is not nervous/anxious.        Objective   Physical Exam  Vitals and nursing note reviewed.   Constitutional:       General: He is not in acute distress.     Appearance: Normal appearance. He is not diaphoretic.   HENT:      Head: Normocephalic and atraumatic.      Right Ear: External ear normal.      Left Ear: External ear normal.      Nose: Nose normal.   Eyes:      Extraocular Movements: Extraocular movements intact.      Conjunctiva/sclera: Conjunctivae normal.   Neck:      Trachea: Trachea normal.   Cardiovascular:      Rate and Rhythm: Normal rate and regular rhythm.      Heart sounds: Normal heart sounds.   Pulmonary:      Effort: Pulmonary effort is normal. No respiratory distress.   Abdominal:       General: Abdomen is flat.   Musculoskeletal:         General: Deformity present.      Cervical back: Neck supple.      Comments: In wheelchair   Skin:     General: Skin is warm and dry.      Findings: No erythema.      Comments: Right leg resolving abrasion   Neurological:      Mental Status: He is alert and oriented to person, place, and time.      Comments: No gross motor or sensory deficits         Assessment/Plan   Diagnoses and all orders for this visit:    1. Benign essential hypertension (Primary)    2. Mixed hyperlipidemia    3. Type 2 diabetes mellitus with hyperglycemia, with long-term current use of insulin (HCC)    4. Leg abrasion, right, initial encounter  -     mupirocin (Bactroban) 2 % cream; Apply 1 application topically to the appropriate area as directed 3 (Three) Times a Day.  Dispense: 15 g; Refill: 2    5. Need for COVID-19 vaccine  -     COVID-19 Vaccine (Pfizer)      Plan:  1.  Benign essential hypertension: Will continue current medication, low-sodium diet advised, Counseled to regularly check BP at home with goal averaging <130/80.   2.mixed hyperlipidemia: will obtain   fasting CMP and lipid panel.  Diet and exercise counseled,  Will continue current medications  3. Diabetes  Mellitus  : will obtain   fasting CMP  and hba1c  , diet and exercise counseled , Will continue current medications  4. Right leg resolving abrasion : topical bactroban   5. Need for Covid  vaccine booster: After obtaining verbal and written informed consent patient was given Pfizer vaccine COVID booster in the clinic today and monitored 15 minutes post procedure, which was uneventful and patient tolerated the procedure well.

## 2021-11-12 ENCOUNTER — TELEPHONE (OUTPATIENT)
Dept: PULMONOLOGY | Facility: CLINIC | Age: 61
End: 2021-11-12

## 2021-11-12 NOTE — TELEPHONE ENCOUNTER
Patient called and states he is having a lot of shortness of breath even with his oxygen on and he is wheezing. I spoke with Matilde about this and she advised me to let him know he should go to the ER. I called and informed his wife of this information and she verbalized understanding.

## 2021-11-20 NOTE — PROGRESS NOTES
Continued Stay Note  AMANDA Ochoa     Patient Name: Denzel Harding  MRN: [id number]  Today's Date: 6/3/2019    Admit Date: 5/22/2019    Discharge Plan     Row Name 06/03/19 1156       Plan    Plan Comments  Spoke to pt regarding discharge plans He is planning to retrun home comfirmed his preference for oxygen if needed is [employer]         Discharge Codes    No documentation.       Expected Discharge Date and Time     Expected Discharge Date Expected Discharge Time    Jun 4, 2019             Betsy Lea     normal (ped)...

## 2021-11-25 ENCOUNTER — HOSPITAL ENCOUNTER (EMERGENCY)
Facility: HOSPITAL | Age: 61
Discharge: HOME | End: 2021-11-25
Attending: EMERGENCY MEDICINE | Admitting: EMERGENCY MEDICINE

## 2021-11-25 ENCOUNTER — APPOINTMENT (OUTPATIENT)
Dept: GENERAL RADIOLOGY | Facility: HOSPITAL | Age: 61
End: 2021-11-25

## 2021-11-25 VITALS
HEART RATE: 77 BPM | SYSTOLIC BLOOD PRESSURE: 105 MMHG | TEMPERATURE: 98.6 F | BODY MASS INDEX: 38.94 KG/M2 | DIASTOLIC BLOOD PRESSURE: 52 MMHG | WEIGHT: 272 LBS | RESPIRATION RATE: 16 BRPM | HEIGHT: 70 IN | OXYGEN SATURATION: 99 %

## 2021-11-25 DIAGNOSIS — I20.9 ANGINA PECTORIS (HCC): Primary | ICD-10-CM

## 2021-11-25 LAB
ALBUMIN SERPL-MCNC: 4 G/DL (ref 3.5–5.2)
ALBUMIN/GLOB SERPL: 1.4 G/DL
ALP SERPL-CCNC: 85 U/L (ref 39–117)
ALT SERPL W P-5'-P-CCNC: 48 U/L (ref 1–41)
ANION GAP SERPL CALCULATED.3IONS-SCNC: 7.5 MMOL/L (ref 5–15)
AST SERPL-CCNC: 38 U/L (ref 1–40)
BASOPHILS # BLD AUTO: 0.06 10*3/MM3 (ref 0–0.2)
BASOPHILS NFR BLD AUTO: 0.9 % (ref 0–1.5)
BILIRUB SERPL-MCNC: 0.6 MG/DL (ref 0–1.2)
BUN SERPL-MCNC: 7 MG/DL (ref 8–23)
BUN/CREAT SERPL: 10.6 (ref 7–25)
CALCIUM SPEC-SCNC: 9.3 MG/DL (ref 8.6–10.5)
CHLORIDE SERPL-SCNC: 101 MMOL/L (ref 98–107)
CO2 SERPL-SCNC: 31.5 MMOL/L (ref 22–29)
CREAT SERPL-MCNC: 0.66 MG/DL (ref 0.76–1.27)
DEPRECATED RDW RBC AUTO: 41.4 FL (ref 37–54)
EOSINOPHIL # BLD AUTO: 0.59 10*3/MM3 (ref 0–0.4)
EOSINOPHIL NFR BLD AUTO: 8.7 % (ref 0.3–6.2)
ERYTHROCYTE [DISTWIDTH] IN BLOOD BY AUTOMATED COUNT: 14.4 % (ref 12.3–15.4)
GFR SERPL CREATININE-BSD FRML MDRD: 123 ML/MIN/1.73
GLOBULIN UR ELPH-MCNC: 2.8 GM/DL
GLUCOSE SERPL-MCNC: 185 MG/DL (ref 65–99)
HCT VFR BLD AUTO: 40.7 % (ref 37.5–51)
HGB BLD-MCNC: 13.3 G/DL (ref 13–17.7)
HOLD SPECIMEN: NORMAL
HOLD SPECIMEN: NORMAL
IMM GRANULOCYTES # BLD AUTO: 0.08 10*3/MM3 (ref 0–0.05)
IMM GRANULOCYTES NFR BLD AUTO: 1.2 % (ref 0–0.5)
LYMPHOCYTES # BLD AUTO: 1.58 10*3/MM3 (ref 0.7–3.1)
LYMPHOCYTES NFR BLD AUTO: 23.3 % (ref 19.6–45.3)
MCH RBC QN AUTO: 26 PG (ref 26.6–33)
MCHC RBC AUTO-ENTMCNC: 32.7 G/DL (ref 31.5–35.7)
MCV RBC AUTO: 79.6 FL (ref 79–97)
MONOCYTES # BLD AUTO: 0.44 10*3/MM3 (ref 0.1–0.9)
MONOCYTES NFR BLD AUTO: 6.5 % (ref 5–12)
NEUTROPHILS NFR BLD AUTO: 4.03 10*3/MM3 (ref 1.7–7)
NEUTROPHILS NFR BLD AUTO: 59.4 % (ref 42.7–76)
NRBC BLD AUTO-RTO: 0 /100 WBC (ref 0–0.2)
PLATELET # BLD AUTO: 157 10*3/MM3 (ref 140–450)
PMV BLD AUTO: 9.4 FL (ref 6–12)
POTASSIUM SERPL-SCNC: 4 MMOL/L (ref 3.5–5.2)
PROT SERPL-MCNC: 6.8 G/DL (ref 6–8.5)
RBC # BLD AUTO: 5.11 10*6/MM3 (ref 4.14–5.8)
SODIUM SERPL-SCNC: 140 MMOL/L (ref 136–145)
TROPONIN T SERPL-MCNC: <0.01 NG/ML (ref 0–0.03)
TROPONIN T SERPL-MCNC: <0.01 NG/ML (ref 0–0.03)
WBC NRBC COR # BLD: 6.78 10*3/MM3 (ref 3.4–10.8)
WHOLE BLOOD HOLD SPECIMEN: NORMAL
WHOLE BLOOD HOLD SPECIMEN: NORMAL

## 2021-11-25 PROCEDURE — 93005 ELECTROCARDIOGRAM TRACING: CPT | Performed by: EMERGENCY MEDICINE

## 2021-11-25 PROCEDURE — 71045 X-RAY EXAM CHEST 1 VIEW: CPT

## 2021-11-25 PROCEDURE — 36415 COLL VENOUS BLD VENIPUNCTURE: CPT

## 2021-11-25 PROCEDURE — 85025 COMPLETE CBC W/AUTO DIFF WBC: CPT | Performed by: EMERGENCY MEDICINE

## 2021-11-25 PROCEDURE — 96374 THER/PROPH/DIAG INJ IV PUSH: CPT

## 2021-11-25 PROCEDURE — 80053 COMPREHEN METABOLIC PANEL: CPT | Performed by: EMERGENCY MEDICINE

## 2021-11-25 PROCEDURE — 84484 ASSAY OF TROPONIN QUANT: CPT | Performed by: EMERGENCY MEDICINE

## 2021-11-25 PROCEDURE — 84484 ASSAY OF TROPONIN QUANT: CPT | Performed by: PHYSICIAN ASSISTANT

## 2021-11-25 PROCEDURE — 96375 TX/PRO/DX INJ NEW DRUG ADDON: CPT

## 2021-11-25 PROCEDURE — 25010000002 ONDANSETRON PER 1 MG: Performed by: PHYSICIAN ASSISTANT

## 2021-11-25 PROCEDURE — 25010000002 KETOROLAC TROMETHAMINE PER 15 MG: Performed by: PHYSICIAN ASSISTANT

## 2021-11-25 PROCEDURE — 99284 EMERGENCY DEPT VISIT MOD MDM: CPT

## 2021-11-25 RX ORDER — ASPIRIN 325 MG
325 TABLET ORAL ONCE
Status: DISCONTINUED | OUTPATIENT
Start: 2021-11-25 | End: 2021-11-25

## 2021-11-25 RX ORDER — SODIUM CHLORIDE 0.9 % (FLUSH) 0.9 %
10 SYRINGE (ML) INJECTION AS NEEDED
Status: DISCONTINUED | OUTPATIENT
Start: 2021-11-25 | End: 2021-11-25 | Stop reason: HOSPADM

## 2021-11-25 RX ORDER — KETOROLAC TROMETHAMINE 30 MG/ML
15 INJECTION, SOLUTION INTRAMUSCULAR; INTRAVENOUS ONCE
Status: COMPLETED | OUTPATIENT
Start: 2021-11-25 | End: 2021-11-25

## 2021-11-25 RX ORDER — ONDANSETRON 2 MG/ML
4 INJECTION INTRAMUSCULAR; INTRAVENOUS ONCE
Status: COMPLETED | OUTPATIENT
Start: 2021-11-25 | End: 2021-11-25

## 2021-11-25 RX ORDER — FAMOTIDINE 10 MG/ML
20 INJECTION, SOLUTION INTRAVENOUS ONCE
Status: COMPLETED | OUTPATIENT
Start: 2021-11-25 | End: 2021-11-25

## 2021-11-25 RX ORDER — NITROGLYCERIN 400 UG/1
1 SPRAY ORAL
Qty: 4.9 G | Refills: 0 | Status: ON HOLD | OUTPATIENT
Start: 2021-11-25 | End: 2022-01-01

## 2021-11-25 RX ADMIN — KETOROLAC TROMETHAMINE 15 MG: 30 INJECTION, SOLUTION INTRAMUSCULAR; INTRAVENOUS at 16:56

## 2021-11-25 RX ADMIN — LIDOCAINE HYDROCHLORIDE: 20 SOLUTION ORAL; TOPICAL at 15:13

## 2021-11-25 RX ADMIN — FAMOTIDINE 20 MG: 10 INJECTION INTRAVENOUS at 15:53

## 2021-11-25 RX ADMIN — ONDANSETRON 4 MG: 2 INJECTION INTRAMUSCULAR; INTRAVENOUS at 15:13

## 2021-11-25 NOTE — ED PROVIDER NOTES
Subjective   History of Present Illness   Patient is a 61-year-old male with history of CAD status post CABG 2017 performed by Dr. Damon in [place] as well as multiple other comorbidities presenting to the ER with complaints of chest pain that started last night.  He states that it is sharp intermittent pains with stabbing pains at times.  He states that it feels like somebody took a hot knife and stabbed it into his chest.  Patient reports radiation to left jaw and shoulder.  He states that he took nitro spray prior to arrival and his daughter looked at it afterwards and told him it was out of date.  He states that he takes his Plavix at night so has not had that yet today.  He did state that he took his aspirin today.  He denies additional anticoagulants.  He states he has had associated nausea.  He states he is still currently having chest pain.  He also reports headache but states that this is chronic and he has had headaches for the last several months.  He denies additional symptoms or complaints at this time.    Review of Systems   Cardiovascular: Positive for chest pain.   Gastrointestinal: Positive for nausea.   Neurological: Positive for headaches.   All other systems reviewed and are negative.      Past Medical History:   Diagnosis Date   • Anxiety    • Arthritis    • Asthma    • Brachial neuritis 1/19/2017   • Cellulitis     left arm and right leg    • Chest pain    • CHF (congestive heart failure) ([facility])     Patient reported history May 2020    • COPD (chronic obstructive pulmonary disease) ([facility])    • Coronary artery disease     Patient reported CABG , 3 vessel   • Depression    • Diabetes mellitus ([facility])     diagnosed in 1998, checks fsbg 3-4x/day   • Dizziness    • Edema    • Elevated cholesterol    • GERD (gastroesophageal reflux disease)    • H/O chest x-ray 07/04/2015    Mild Left base atelectasis   • H/O echocardiogram 07/05/2015    Normal LVSF. EF of 60-65%. Grade 1 diastolic dysfunction  of the LV myocardium. No evidence of pericardial effusion   • H/O exercise stress test    • Hearing loss     No use of hearing aids   • History of fracture     Reported 2 bones in left foot and a finger   • History of herniated intervertebral disc     History of left L5-S1 disc herniation   • History of pneumonia    • History of pneumonia    • Hyperlipidemia    • Hypertension    • Impaired functional mobility, balance, gait, and endurance    • LOM (loss of memory) 1/19/2017   • Lower back pain     Right   • Measles    • MUSE (nonalcoholic steatohepatitis)    • Neuropathy     feet    • EDD treated with BiPAP     BiPAP HS - instructed to bring mask/machine DOS (setting 13 and 5)   • Palpitations    • Pericardial effusion    • Poor dentition     Patient reported missing multiple teeth   • Renal disorder    • Seizure disorder (HCC)     focal seizures   • SOB (shortness of breath)    • Staph infection     back after sugery at the incision site    • Stroke (HCC)     x2 most recent , slight weakness in hands occasionaly    • Wears glasses        Allergies   Allergen Reactions   • Ajovy [Fremanezumab-Vfrm] Other (See Comments)     Caused tightness of chest, vomiting, pain in both arms.   • Sulfa Antibiotics Anaphylaxis, Nausea And Vomiting and Delirium   • Invokana [Canagliflozin] Unknown - Low Severity     DKA   • Codeine Nausea And Vomiting     Can only take with Phenergan   • Morphine Unknown - Low Severity     Does not work. It causes pain       Past Surgical History:   Procedure Laterality Date   • BACK SURGERY     • CARDIAC CATHETERIZATION     • CARDIAC CATHETERIZATION N/A 8/11/2017    Procedure: Coronary angiography;  Surgeon: Dallas Kim MD;  Location: [facility] CATH INVASIVE LOCATION;  Service:    • CARDIAC CATHETERIZATION N/A 8/11/2017    Procedure: Left Heart Cath;  Surgeon: Dallas Kim MD;  Location: [facility] CATH INVASIVE LOCATION;  Service:    • CARDIAC CATHETERIZATION N/A 8/11/2017    Procedure:  Left ventriculography;  Surgeon: Dallas Hernandez MD;  Location: [facility] CATH INVASIVE LOCATION;  Service:    • CARDIAC CATHETERIZATION      2002 x1 stent,  x1 stent   • CARDIOVASCULAR STRESS TEST  07/03/2017    WITH DR HERNANDEZ AT [facility]   • CARPAL TUNNEL RELEASE Right    • CATARACT EXTRACTION W/ INTRAOCULAR LENS IMPLANT Right 6/12/2017    Procedure: CATARACT PHACO EXTRACTION WITH INTRAOCULAR LENS IMPLANT RIGHT ;  Surgeon: Natalie Cao MD;  Location: [facility] OR;  Service:    • CATARACT EXTRACTION W/ INTRAOCULAR LENS IMPLANT Left 7/10/2017    Procedure: CATARACT PHACO EXTRACTION WITH INTRAOCULAR LENS IMPLANT LEFT;  Surgeon: Natalie Cao MD;  Location: [facility] OR;  Service:    • CHOLECYSTECTOMY     • COLONOSCOPY     • COLONOSCOPY N/A 7/2/2020    Procedure: COLONOSCOPY;  Surgeon: Petra Crowell MD;  Location: [facility] ENDOSCOPY;  Service: Gastroenterology;  Laterality: N/A;  poor prep   • CORONARY ANGIOPLASTY WITH STENT PLACEMENT      X1-LAD   • CORONARY ARTERY BYPASS GRAFT N/A 8/15/2017    Procedure: CORONARY ARTERY BYPASS GRAFTING x 3 UTILIZING THE LEFT INTERNAL MAMMARY ARTERY WITH ENDOSCOPIC VEIN HARVESTING OF THE RIGHT GREATER SAPHENOUS VEIN, HAMLET, LAD ENDARECTOMY;  Surgeon: London Damon MD;  Location: [facility] OR;  Service:    • ENDOSCOPY     • EYE CAPSULOTOMY WITH LASER Right 11/25/2019    Procedure: EYE CAPSULOTOMY WITH LASER RIGHT;  Surgeon: Natalie Cao MD;  Location: [facility] OR;  Service: Ophthalmology   • EYE CAPSULOTOMY WITH LASER Left 12/9/2019    Procedure: EYE CAPSULOTOMY WITH LASER LEFT;  Surgeon: Natalie Cao MD;  Location: [facility] OR;  Service: Ophthalmology   • EYE SURGERY      cataract surgery both eyes   • INTERVENTIONAL RADIOLOGY PROCEDURE Bilateral 12/31/2019    Procedure: Carotid Cerebral Angiogram;  Surgeon: Damian Dubois MD;  Location: [facility] CATH INVASIVE LOCATION;  Service: Interventional Radiology   • KNEE ARTHROSCOPY Bilateral    • KNEE ARTHROSCOPY Right 2010    Dr Lewis    • KNEE ARTHROSCOPY Left     Dr Jameson   • KNEE MENISCECTOMY Right 10/15/2020    Procedure: Right knee arthroscopy with partial medial and lateral meniscectomy and three compartment chondroplasty.;  Surgeon: South Lewis MD;  Location: [facility];  Service: Orthopedics;  Laterality: Right;   • MOUTH SURGERY      Oral extractions   • NEUROPLASTY / TRANSPOSITION ULNAR NERVE AT ELBOW Left    • OTHER SURGICAL HISTORY      Foraminotomy and discectomy   • PERICARDIAL WINDOW N/A 2017    Procedure: PERICARDIAL WINDOW;  Surgeon: Freeman Phillips MD;  Location: [facility];  Service:        Family History   Problem Relation Age of Onset   • Hypertension Mother    • Arthritis Mother    • Stomach cancer Mother    • Alcohol abuse Father    • Heart disease Other    • Hypertension Other    • Other Other         Neurologic disorder   • Heart attack Other    • Parkinsonism Other    • Stroke Other    • Heart disease Other    • Hypertension Other    • Heart disease Other    • Stroke Other    • Hypertension Other    • Colon cancer Neg Hx        Social History     Socioeconomic History   • Marital status:    • Number of children: 1   Tobacco Use   • Smoking status: Former Smoker     Packs/day: 1.00     Years: 10.00     Pack years: 10.00     Types: Cigarettes     Quit date: 1998     Years since quittin.9   • Smokeless tobacco: Former User     Types: Snuff     Quit date:    Substance and Sexual Activity   • Alcohol use: Yes     Comment: 3 drinks a year   • Drug use: No   • Sexual activity: Defer           Objective   Physical Exam  Vitals and nursing note reviewed.   Constitutional:       General: He is not in acute distress.     Appearance: He is not toxic-appearing.   HENT:      Head: Normocephalic and atraumatic.   Eyes:      Extraocular Movements: Extraocular movements intact.   Cardiovascular:      Rate and Rhythm: Normal rate.      Heart sounds: Normal heart sounds.   Pulmonary:      Effort: Pulmonary  effort is normal. No tachypnea or respiratory distress.      Breath sounds: Normal breath sounds.   Abdominal:      Palpations: Abdomen is soft.      Tenderness: There is no abdominal tenderness.   Musculoskeletal:         General: Normal range of motion.      Cervical back: Normal range of motion and neck supple.   Skin:     General: Skin is warm and dry.   Neurological:      General: No focal deficit present.      Mental Status: He is alert and oriented to person, place, and time.   Psychiatric:         Mood and Affect: Mood normal.         Behavior: Behavior normal.         Procedures           ED Course  ED Course as of 11/25/21 1733   Thu Nov 25, 2021   1502 EKG interpreted by me.  Sinus rhythm.  Rate of 82.  Occasional PVCs.  Prolonged QT interval.  No obvious ST or T wave changes.  Abnormal EKG [CG]   1626 Troponin T: <0.010 [AP]   1626 Glucose(!): 185 [AP]   1626 BUN(!): 7 [AP]   1626 Creatinine(!): 0.66 [AP]   1626 Sodium: 140 [AP]   1626 Potassium: 4.0 [AP]   1626 Chloride: 101 [AP]   1626 CO2(!): 31.5 [AP]   1626 Calcium: 9.3 [AP]   1626 Total Protein: 6.8 [AP]   1626 Albumin: 4.00 [AP]   1626 ALT (SGPT)(!): 48 [AP]   1626 AST (SGOT): 38 [AP]   1626 Alkaline Phosphatase: 85 [AP]   1626 Total Bilirubin: 0.6 [AP]   1626 eGFR Non  Am: 123 [AP]   1626 BUN/Creatinine Ratio: 10.6 [AP]   1626 Anion Gap: 7.5 [AP]   1626 WBC: 6.78 [AP]   1626 RBC: 5.11 [AP]   1626 Hemoglobin: 13.3 [AP]   1626 Hematocrit: 40.7 [AP]   1626 MCV: 79.6 [AP]   1626 Platelets: 157 [AP]   1627 Narrative & Impression  PROCEDURE: XR CHEST 1 VW-     INDICATION:  Chest Pain Triage Protocol     FINDINGS:  A portable view of the chest was obtained.  Comparison is  made to a prior exam dated 09/25/2021.   Patient is again noted to be  status post median sternotomy. Heart size is normal. Lung volumes are  low. The lungs are otherwise clear. There is no pleural effusion or  pneumothorax.     IMPRESSION:  Low lung volumes. Otherwise, no  acute process.     This report was finalized on 11/25/2021 2:29 PM by Rosanna Martinez MD.          Specimen Collected: 11/25/21 14:29         [AP]   1651 Troponin T: <0.010 [AP]      ED Course User Index  [AP] Maine Wilkinson, HAILEY  [CG] Deep Avitia, DO                                           MDM   Patient was evaluated in the ER for intermittent chest pain that began last night.  Patient's vitals are within normal limits upon arrival.  He is stable throughout ER visit.  Labs are unremarkable.  2 negative troponins.  Chest x-ray unremarkable.  EKG with no obvious acute ischemic changes.  HEART score 6.  Given the patient's history, Dr. Kim with Cardiology was contacted.  He performed the patient's heart cath in 2017.  He states that he will see the patient in his clinic.  He does not see any need for further work-up acutely. Patient was given NTG prescription per request. Patient was advised to follow-up with his PCP and cardiology.  Precautions were given for return to the ER for any new or worsening symptoms.    Final diagnoses:   Angina pectoris (HCC)       ED Disposition  ED Disposition     ED Disposition Condition Comment    Discharge Stable           Isaura Godoy MD  [address]  [address]  [facility] [id number] [phone]    Schedule an appointment as soon as possible for a visit       [facility] Emergency Department  [address] [address] [phone]  Go to   As needed, If symptoms worsen    Dallas Kim MD  [address]  [address]  [facility] [address] [phone]    Schedule an appointment as soon as possible for a visit   for re-evaluation of chest pain         Medication List      New Prescriptions    nitroglycerin 0.4 MG/SPRAY spray  Commonly known as: NITROLINGUAL  Place 1 spray under the tongue Every 5 (Five) Minutes As Needed for Chest Pain.        Changed    omeprazole 20 MG capsule  Commonly known as: priLOSEC  Take 1  capsule by mouth Daily.  What changed: when to take this           Where to Get Your Medications      These medications were sent to [id number] - [place], [place] - [address] - [phone]  - [phone] FX  [address] [place] [address]    Phone: [phone]   · nitroglycerin 0.4 MG/SPRAY spray          Maine Wilkinson PA-C  11/25/21 1726       Maine Wilkinson PA-C  11/25/21 1732

## 2021-11-25 NOTE — ED NOTES
Called Dr. Kim at this time per [place]. Call transferred at this time.     Divina North, RN  11/25/21 9905

## 2021-12-07 ENCOUNTER — TELEPHONE (OUTPATIENT)
Dept: SURGERY | Facility: CLINIC | Age: 61
End: 2021-12-07

## 2021-12-07 NOTE — TELEPHONE ENCOUNTER
Caller:  Carol Harding    Relationship: WIFE    Best call back number: [phone]    What is your medical concern? PT STATES HERNIA PAIN HAS WORSEN SINCE HE WAS SEEN IN OCT. 2021 BY DR. NAJERA. WOULD LIKE TO KNOW OPTIONS ON SURGERY. PLEASE CALL PT WIFE TO DISCUSS.     How long has this issue been going on? SINCE OCT. 2021    Is your provider already aware of this issue? AWARE OF HERNIA    Have you been treated for this issue?

## 2021-12-08 ENCOUNTER — TELEPHONE (OUTPATIENT)
Dept: SURGERY | Facility: CLINIC | Age: 61
End: 2021-12-08

## 2021-12-08 ENCOUNTER — OFFICE VISIT (OUTPATIENT)
Dept: SURGERY | Facility: CLINIC | Age: 61
End: 2021-12-08

## 2021-12-08 VITALS
OXYGEN SATURATION: 97 % | HEIGHT: 70 IN | DIASTOLIC BLOOD PRESSURE: 64 MMHG | WEIGHT: 272 LBS | SYSTOLIC BLOOD PRESSURE: 108 MMHG | BODY MASS INDEX: 38.94 KG/M2 | HEART RATE: 78 BPM | TEMPERATURE: 98.1 F

## 2021-12-08 DIAGNOSIS — K42.0 UMBILICAL HERNIA WITH OBSTRUCTION, WITHOUT GANGRENE: Primary | ICD-10-CM

## 2021-12-08 PROCEDURE — 99213 OFFICE O/P EST LOW 20 MIN: CPT | Performed by: SURGERY

## 2021-12-08 NOTE — TELEPHONE ENCOUNTER
Provider: DR. RENETTA NAJERA   Caller: CHARLES HASSAN   Relationship to Patient: SPOUSE     Phone Number: [phone]  Reason for Call: PATIENT CALLED DR. MYCHAL IBRAHIM FROM PULMONARY TO GET CLEARANCE FOR SURGERY BUT DR. IBRAHIM NEEDS THE NAME OF THE SURGERY.     DR. IBRAHIM'S # [phone]  FAX # [phone]

## 2021-12-08 NOTE — PROGRESS NOTES
Patient: Denzel Harding  YOB: 1960    Date: 12/08/2021    Primary Care Provider: Isaura Godoy MD    Chief Complaint   Patient presents with   • Abdominal Pain       History: Patient has a history significant for CAD, CHF and COPD.  Patient is on oxygen 24 hours a day and he is s/p CABG which was done in the remote past.  Patient is here for follow-up umbilical hernia.  Patient was seen in the office 10/12/2021 at which time he complained of a palpable mass at his right abdominal wall adjacent to his umbilicus.  Abdominal wall (soft tissue) ultrasound was performed which showed an umbilical hernia that did contain omentum.  Patient complains of increased pain at the site of his hernia.      I have seen the patient in October of this year for the same problem, he is under the care of Dr. Barajas for his pulmonary issues.  He continues to be on oxygen 24 hours a day.  He describes discomfort in the periumbilical region and to the right of the midline that sharp in nature, associated with a palpable mass, worse with prolonged standing and heavy lifting, present over many months, does not seem to be getting any better, nonradiating.    The following portions of the patient's history were reviewed and updated as appropriate: allergies, current medications, past family history, past medical history, past social history, past surgical history and problem list.    Review of Systems   Constitutional: Negative for chills, fever and unexpected weight change.   HENT: Negative for trouble swallowing and voice change.    Eyes: Negative for visual disturbance.   Respiratory: Negative for apnea, cough, chest tightness, shortness of breath and wheezing.    Cardiovascular: Negative for chest pain, palpitations and leg swelling.   Gastrointestinal: Positive for abdominal pain. Negative for abdominal distention, anal bleeding, blood in stool, constipation, diarrhea, nausea, rectal pain and vomiting.   Endocrine: Negative  "for cold intolerance and heat intolerance.   Genitourinary: Negative for difficulty urinating, dysuria, flank pain, scrotal swelling and testicular pain.   Musculoskeletal: Negative for back pain, gait problem and joint swelling.   Skin: Negative for color change, rash and wound.   Neurological: Negative for dizziness, syncope, speech difficulty, weakness, numbness and headaches.   Hematological: Negative for adenopathy. Does not bruise/bleed easily.   Psychiatric/Behavioral: Negative for confusion. The patient is not nervous/anxious.        Vital Signs  Vitals:    12/08/21 1417   BP: 108/64   Pulse: 78   Temp: 98.1 °F (36.7 °C)   SpO2: 97%   Weight: 123 kg (272 lb)   Height: 177.8 cm (70\")       Allergies:  Allergies   Allergen Reactions   • Ajovy [Fremanezumab-Vfrm] Other (See Comments)     Caused tightness of chest, vomiting, pain in both arms.   • Sulfa Antibiotics Anaphylaxis, Nausea And Vomiting and Delirium   • Invokana [Canagliflozin] Unknown - Low Severity     DKA   • Codeine Nausea And Vomiting     Can only take with Phenergan   • Morphine Unknown - Low Severity     Does not work. It causes pain       Medications:    Current Outpatient Medications:   •  albuterol sulfate HFA (Ventolin HFA) 108 (90 Base) MCG/ACT inhaler, Inhale 2 puffs Every 4 (Four) Hours As Needed for Wheezing or Shortness of Air., Disp: 8 g, Rfl: 5  •  amitriptyline (ELAVIL) 25 MG tablet, TAKE 3 TABLETS BY MOUTH EVERY NIGHT AT BEDTIME, Disp: 270 tablet, Rfl: 0  •  aspirin  MG tablet, Take 1 tablet by mouth Daily., Disp: 30 tablet, Rfl: 0  •  atorvastatin (LIPITOR) 80 MG tablet, Take 1 tablet by mouth Every Night., Disp: 90 tablet, Rfl: 1  •  azelastine (ASTELIN) 0.1 % nasal spray, 1 spray into the nostril(s) as directed by provider 2 (Two) Times a Day As Needed for Rhinitis or Allergies. Use in each nostril as directed, Disp: 1 each, Rfl: 5  •  B-D ULTRAFINE III SHORT PEN 31G X 8 MM misc, 2 (Two) Times a Day., Disp: , Rfl:   •  " budesonide-formoterol (Symbicort) 80-4.5 MCG/ACT inhaler, Inhale 2 puffs 2 (Two) Times a Day. Rinse mouth with water after use., Disp: 1 each, Rfl: 3  •  ciprofloxacin (CILOXAN) 0.3 % ophthalmic solution, INSTILL 5 DROPS INTO LEFT EAR TWICE DAILY FOR 7 DAYS, Disp: , Rfl:   •  clopidogrel (PLAVIX) 75 MG tablet, Take 1 tablet by mouth Daily., Disp: 90 tablet, Rfl: 1  •  coenzyme Q10 100 MG capsule, Take 100 mg by mouth Daily., Disp: , Rfl:   •  docusate sodium (Colace) 100 MG capsule, Take 1 capsule by mouth 2 (Two) Times a Day As Needed for Constipation., Disp: 60 capsule, Rfl: 3  •  Dupixent 300 MG/2ML solution prefilled syringe, INJECT 300 MG SUBCUTANEOUSLY EVERY OTHER WEEK, Disp: 4 each, Rfl: 4  •  gabapentin (NEURONTIN) 600 MG tablet, Take 1 tablet by mouth 3 (Three) Times a Day., Disp: 270 tablet, Rfl: 3  •  HumuLIN R U-500 KwikPen 500 UNIT/ML solution pen-injector CONCENTRATED injection, Inject 190 Units under the skin into the appropriate area as directed Every Morning. Takes around 9AM, Disp: , Rfl:   •  ibuprofen (ADVIL,MOTRIN) 800 MG tablet, Take 800 mg by mouth., Disp: , Rfl:   •  Insulin Regular Human, Conc, (HumuLIN R U-500 KwikPen) 500 UNIT/ML solution pen-injector CONCENTRATED injection, Inject 180 Units under the skin into the appropriate area as directed Every Evening. Takes at 3PM, Disp: , Rfl:   •  Insulin Regular Human, Conc, (HumuLIN R U-500 KwikPen) 500 UNIT/ML solution pen-injector CONCENTRATED injection, Inject 180 Units under the skin into the appropriate area as directed Every Night. Patient takes at 8PM, Disp: , Rfl:   •  ipratropium-albuterol (DUO-NEB) 0.5-2.5 mg/3 ml nebulizer, Take 3 mL by nebulization 4 (Four) Times a Day As Needed for Wheezing or Shortness of Air., Disp: 360 mL, Rfl: 5  •  lamoTRIgine (LaMICtal) 100 MG tablet, Take 100 mg by mouth 2 (Two) Times a Day., Disp: , Rfl:   •  Lancets (OneTouch Delica Plus [id number]) misc, , Disp: , Rfl:   •  levETIRAcetam (KEPPRA) 1000 MG  tablet, Take 1,000 mg by mouth 2 (Two) Times a Day., Disp: , Rfl:   •  levocetirizine (XYZAL) 5 MG tablet, Take 1 tablet by mouth Every Evening., Disp: 90 tablet, Rfl: 3  •  lisinopril (PRINIVIL,ZESTRIL) 5 MG tablet, Take 1 tablet by mouth Daily., Disp: 90 tablet, Rfl: 1  •  meclizine (ANTIVERT) 25 MG tablet, Take 1 tablet by mouth 3 (Three) Times a Day As Needed for dizziness., Disp: 10 tablet, Rfl: 0  •  metoprolol succinate XL (TOPROL-XL) 200 MG 24 hr tablet, Take 1 tablet by mouth Daily, Disp: 90 tablet, Rfl: 2  •  mupirocin (Bactroban) 2 % cream, Apply 1 application topically to the appropriate area as directed 3 (Three) Times a Day., Disp: 15 g, Rfl: 2  •  Nebulizer device, 1 Device Take As Directed., Disp: 1 each, Rfl: 0  •  nitroglycerin (NITROLINGUAL) 0.4 MG/SPRAY spray, Place 1 spray under the tongue Every 5 (Five) Minutes As Needed for Chest Pain., Disp: 4.9 g, Rfl: 0  •  omeprazole (priLOSEC) 20 MG capsule, Take 1 capsule by mouth Daily. (Patient taking differently: Take 20 mg by mouth Every Night.), Disp: 90 capsule, Rfl: 0  •  OneTouch Verio test strip, , Disp: , Rfl:   •  predniSONE (DELTASONE) 10 MG tablet, Take 4 tablets by mouth daily for 4 days, then take 3 tablets daily for 3 days, then take 2 tablets daily for 2 days, then take 1 tablet daily for 2 days, Disp: 31 tablet, Rfl: 0  •  promethazine (PHENERGAN) 25 MG tablet, Take 1 tablet by mouth Every 6 (Six) Hours As Needed for Nausea or Vomiting., Disp: 20 tablet, Rfl: 0  •  Semaglutide, 1 MG/DOSE, (Ozempic, 1 MG/DOSE,) 2 MG/1.5ML solution pen-injector, Inject 1 mg under the skin into the appropriate area as directed 1 (One) Time Per Week., Disp: 9 mL, Rfl: 3  •  spironolactone (ALDACTONE) 25 MG tablet, Take 1 tablet by mouth Daily., Disp: 90 tablet, Rfl: 1  •  Tiotropium Bromide Monohydrate (Spiriva Respimat) 1.25 MCG/ACT aerosol solution inhaler, Inhale 2 puffs Daily., Disp: 4 g, Rfl: 5  •  TURMERIC PO, Take 500 mg by mouth 2 (Two) Times a  Day., Disp: , Rfl:   •  vitamin C (ASCORBIC ACID) 500 MG tablet, Take 500 mg by mouth Daily., Disp: , Rfl:   •  vitamin D3 125 MCG (5000 UT) capsule capsule, Take 5,000 Units by mouth Daily., Disp: , Rfl:   •  vitamin E 400 UNIT capsule, Take 1 capsule by mouth 2 (two) times a day., Disp: 60 capsule, Rfl: 5  •  Vortioxetine HBr (Trintellix) 20 MG tablet, Take 20 mg by mouth Daily., Disp: 90 tablet, Rfl: 0  •  zafirlukast (Accolate) 20 MG tablet, Take 1 tablet by mouth 2 (Two) Times a Day., Disp: 60 tablet, Rfl: 5  •  isosorbide mononitrate (IMDUR) 60 MG 24 hr tablet, Take 1 tablet by mouth Daily, Disp: 90 tablet, Rfl: 3    Physical Exam:   General Appearance:    Alert, cooperative, in no acute distress   Head:    Normocephalic, without obvious abnormality, atraumatic   Lungs:     Clear to auscultation,respirations regular, even and                  unlabored    Heart:    Regular rhythm and normal rate, normal S1 and S2, no            murmur, no gallop, no rub, no click   Abdomen:     Normal bowel sounds, no masses, no organomegaly, soft        non-tender, non-distended, no guarding, no rebound                tenderness, there is evidence of an umbilical hernia present partially reducible   Extremities:   Moves all extremities well, no edema, no cyanosis, no             redness   Pulses:   Pulses palpable and equal bilaterally   Skin:   No bleeding, bruising or rash     Results Review:   I reviewed the patient's new clinical results.  I personally viewed and interpreted the patient's EKG/Telemetry data     Review of Systems was reviewed and confirmed as accurate as documented by the MA.    ASSESSMENT/PLAN:    1. Umbilical hernia with obstruction, without gangrene       I did have a clear detailed and extensive discussion with the patient and his wife in the office today and certainly he has multiple medical problems that we need to address in terms of preoperative pulmonary and cardiac clearance prior to any surgical  intervention.  It sounds like his symptomatology is getting worse, ultimately I think is going to require surgical intervention, I will see him back after he sees Dr. Barajas from the pulmonary service.    Electronically signed by Torres Kim MD  12/08/21

## 2021-12-09 ENCOUNTER — TRANSCRIBE ORDERS (OUTPATIENT)
Dept: LAB | Facility: HOSPITAL | Age: 61
End: 2021-12-09

## 2021-12-09 DIAGNOSIS — Z01.818 PREOPERATIVE CLEARANCE: Primary | ICD-10-CM

## 2021-12-09 NOTE — TELEPHONE ENCOUNTER
I talked with pt's wife, I told her that I called Dr. Craig office and left a message on voice mail asking for a return call.

## 2021-12-09 NOTE — TELEPHONE ENCOUNTER
"I spoke with Naomi Barajas's office in regards to pre-operative pulmonary clearance, she stated \"we will take care of it.\"  "

## 2021-12-10 ENCOUNTER — TELEPHONE (OUTPATIENT)
Dept: SURGERY | Facility: CLINIC | Age: 61
End: 2021-12-10

## 2021-12-10 ENCOUNTER — HOSPITAL ENCOUNTER (EMERGENCY)
Facility: HOSPITAL | Age: 61
Discharge: HOME | End: 2021-12-10
Attending: EMERGENCY MEDICINE | Admitting: EMERGENCY MEDICINE

## 2021-12-10 ENCOUNTER — APPOINTMENT (OUTPATIENT)
Dept: CT IMAGING | Facility: HOSPITAL | Age: 61
End: 2021-12-10

## 2021-12-10 VITALS
RESPIRATION RATE: 17 BRPM | HEIGHT: 70 IN | HEART RATE: 80 BPM | DIASTOLIC BLOOD PRESSURE: 75 MMHG | TEMPERATURE: 98.6 F | SYSTOLIC BLOOD PRESSURE: 133 MMHG | BODY MASS INDEX: 38.94 KG/M2 | WEIGHT: 272 LBS | OXYGEN SATURATION: 97 %

## 2021-12-10 DIAGNOSIS — N30.00 ACUTE CYSTITIS WITHOUT HEMATURIA: ICD-10-CM

## 2021-12-10 DIAGNOSIS — K42.9 UMBILICAL HERNIA WITHOUT OBSTRUCTION AND WITHOUT GANGRENE: Primary | ICD-10-CM

## 2021-12-10 LAB
ALBUMIN SERPL-MCNC: 4.2 G/DL (ref 3.5–5.2)
ALBUMIN/GLOB SERPL: 1.6 G/DL
ALP SERPL-CCNC: 76 U/L (ref 39–117)
ALT SERPL W P-5'-P-CCNC: 44 U/L (ref 1–41)
ANION GAP SERPL CALCULATED.3IONS-SCNC: 9.8 MMOL/L (ref 5–15)
AST SERPL-CCNC: 41 U/L (ref 1–40)
BACTERIA UR QL AUTO: ABNORMAL /HPF
BASOPHILS # BLD AUTO: 0.07 10*3/MM3 (ref 0–0.2)
BASOPHILS NFR BLD AUTO: 1 % (ref 0–1.5)
BILIRUB SERPL-MCNC: 0.6 MG/DL (ref 0–1.2)
BILIRUB UR QL STRIP: NEGATIVE
BUN SERPL-MCNC: 8 MG/DL (ref 8–23)
BUN/CREAT SERPL: 12.3 (ref 7–25)
CALCIUM SPEC-SCNC: 9.4 MG/DL (ref 8.6–10.5)
CHLORIDE SERPL-SCNC: 102 MMOL/L (ref 98–107)
CLARITY UR: ABNORMAL
CO2 SERPL-SCNC: 27.2 MMOL/L (ref 22–29)
COLOR UR: YELLOW
CREAT SERPL-MCNC: 0.65 MG/DL (ref 0.76–1.27)
D-LACTATE SERPL-SCNC: 1.9 MMOL/L (ref 0.5–2)
DEPRECATED RDW RBC AUTO: 41.7 FL (ref 37–54)
EOSINOPHIL # BLD AUTO: 0.37 10*3/MM3 (ref 0–0.4)
EOSINOPHIL NFR BLD AUTO: 5.1 % (ref 0.3–6.2)
ERYTHROCYTE [DISTWIDTH] IN BLOOD BY AUTOMATED COUNT: 14.3 % (ref 12.3–15.4)
GFR SERPL CREATININE-BSD FRML MDRD: 125 ML/MIN/1.73
GLOBULIN UR ELPH-MCNC: 2.6 GM/DL
GLUCOSE SERPL-MCNC: 125 MG/DL (ref 65–99)
GLUCOSE UR STRIP-MCNC: NEGATIVE MG/DL
HCT VFR BLD AUTO: 41.3 % (ref 37.5–51)
HGB BLD-MCNC: 13.5 G/DL (ref 13–17.7)
HGB UR QL STRIP.AUTO: NEGATIVE
HOLD SPECIMEN: NORMAL
HOLD SPECIMEN: NORMAL
HYALINE CASTS UR QL AUTO: ABNORMAL /LPF
IMM GRANULOCYTES # BLD AUTO: 0.07 10*3/MM3 (ref 0–0.05)
IMM GRANULOCYTES NFR BLD AUTO: 1 % (ref 0–0.5)
KETONES UR QL STRIP: NEGATIVE
LEUKOCYTE ESTERASE UR QL STRIP.AUTO: ABNORMAL
LIPASE SERPL-CCNC: 20 U/L (ref 13–60)
LYMPHOCYTES # BLD AUTO: 1.69 10*3/MM3 (ref 0.7–3.1)
LYMPHOCYTES NFR BLD AUTO: 23.2 % (ref 19.6–45.3)
MCH RBC QN AUTO: 26.2 PG (ref 26.6–33)
MCHC RBC AUTO-ENTMCNC: 32.7 G/DL (ref 31.5–35.7)
MCV RBC AUTO: 80 FL (ref 79–97)
MONOCYTES # BLD AUTO: 0.62 10*3/MM3 (ref 0.1–0.9)
MONOCYTES NFR BLD AUTO: 8.5 % (ref 5–12)
NEUTROPHILS NFR BLD AUTO: 4.45 10*3/MM3 (ref 1.7–7)
NEUTROPHILS NFR BLD AUTO: 61.2 % (ref 42.7–76)
NITRITE UR QL STRIP: NEGATIVE
NRBC BLD AUTO-RTO: 0 /100 WBC (ref 0–0.2)
PH UR STRIP.AUTO: 5.5 [PH] (ref 5–8)
PLATELET # BLD AUTO: 194 10*3/MM3 (ref 140–450)
PMV BLD AUTO: 9.5 FL (ref 6–12)
POTASSIUM SERPL-SCNC: 4.1 MMOL/L (ref 3.5–5.2)
PROT SERPL-MCNC: 6.8 G/DL (ref 6–8.5)
PROT UR QL STRIP: NEGATIVE
RBC # BLD AUTO: 5.16 10*6/MM3 (ref 4.14–5.8)
RBC # UR STRIP: ABNORMAL /HPF
REF LAB TEST METHOD: ABNORMAL
SODIUM SERPL-SCNC: 139 MMOL/L (ref 136–145)
SP GR UR STRIP: 1.01 (ref 1–1.03)
SQUAMOUS #/AREA URNS HPF: ABNORMAL /HPF
UROBILINOGEN UR QL STRIP: ABNORMAL
WBC # UR STRIP: ABNORMAL /HPF
WBC NRBC COR # BLD: 7.27 10*3/MM3 (ref 3.4–10.8)
WHOLE BLOOD HOLD SPECIMEN: NORMAL
WHOLE BLOOD HOLD SPECIMEN: NORMAL

## 2021-12-10 PROCEDURE — 25010000002 ONDANSETRON PER 1 MG: Performed by: PHYSICIAN ASSISTANT

## 2021-12-10 PROCEDURE — 25010000002 HYDROMORPHONE 1 MG/ML SOLUTION: Performed by: EMERGENCY MEDICINE

## 2021-12-10 PROCEDURE — 96374 THER/PROPH/DIAG INJ IV PUSH: CPT

## 2021-12-10 PROCEDURE — 25010000002 IOPAMIDOL 61 % SOLUTION: Performed by: EMERGENCY MEDICINE

## 2021-12-10 PROCEDURE — 25010000002 KETOROLAC TROMETHAMINE PER 15 MG: Performed by: PHYSICIAN ASSISTANT

## 2021-12-10 PROCEDURE — 96375 TX/PRO/DX INJ NEW DRUG ADDON: CPT

## 2021-12-10 PROCEDURE — 99283 EMERGENCY DEPT VISIT LOW MDM: CPT

## 2021-12-10 PROCEDURE — 85025 COMPLETE CBC W/AUTO DIFF WBC: CPT | Performed by: PHYSICIAN ASSISTANT

## 2021-12-10 PROCEDURE — 83605 ASSAY OF LACTIC ACID: CPT | Performed by: PHYSICIAN ASSISTANT

## 2021-12-10 PROCEDURE — 74177 CT ABD & PELVIS W/CONTRAST: CPT

## 2021-12-10 PROCEDURE — 81001 URINALYSIS AUTO W/SCOPE: CPT | Performed by: PHYSICIAN ASSISTANT

## 2021-12-10 PROCEDURE — 83690 ASSAY OF LIPASE: CPT | Performed by: PHYSICIAN ASSISTANT

## 2021-12-10 PROCEDURE — 80053 COMPREHEN METABOLIC PANEL: CPT | Performed by: PHYSICIAN ASSISTANT

## 2021-12-10 RX ORDER — ONDANSETRON 2 MG/ML
4 INJECTION INTRAMUSCULAR; INTRAVENOUS ONCE
Status: COMPLETED | OUTPATIENT
Start: 2021-12-10 | End: 2021-12-10

## 2021-12-10 RX ORDER — SODIUM CHLORIDE 0.9 % (FLUSH) 0.9 %
10 SYRINGE (ML) INJECTION AS NEEDED
Status: DISCONTINUED | OUTPATIENT
Start: 2021-12-10 | End: 2021-12-10 | Stop reason: HOSPADM

## 2021-12-10 RX ORDER — KETOROLAC TROMETHAMINE 30 MG/ML
30 INJECTION, SOLUTION INTRAMUSCULAR; INTRAVENOUS EVERY 6 HOURS PRN
Status: DISCONTINUED | OUTPATIENT
Start: 2021-12-10 | End: 2021-12-10 | Stop reason: HOSPADM

## 2021-12-10 RX ORDER — CEFDINIR 300 MG/1
300 CAPSULE ORAL 2 TIMES DAILY
Qty: 14 CAPSULE | Refills: 0 | Status: SHIPPED | OUTPATIENT
Start: 2021-12-10 | End: 2021-12-17

## 2021-12-10 RX ORDER — PROMETHAZINE HYDROCHLORIDE 12.5 MG/1
12.5 TABLET ORAL EVERY 6 HOURS PRN
Qty: 12 TABLET | Refills: 0 | Status: SHIPPED | OUTPATIENT
Start: 2021-12-10 | End: 2022-01-26

## 2021-12-10 RX ORDER — KETOROLAC TROMETHAMINE 10 MG/1
10 TABLET, FILM COATED ORAL EVERY 6 HOURS PRN
Qty: 12 TABLET | Refills: 0 | Status: SHIPPED | OUTPATIENT
Start: 2021-12-10 | End: 2022-01-26

## 2021-12-10 RX ADMIN — ONDANSETRON 4 MG: 2 INJECTION INTRAMUSCULAR; INTRAVENOUS at 14:31

## 2021-12-10 RX ADMIN — KETOROLAC TROMETHAMINE 30 MG: 30 INJECTION, SOLUTION INTRAMUSCULAR; INTRAVENOUS at 14:19

## 2021-12-10 RX ADMIN — HYDROMORPHONE HYDROCHLORIDE 0.5 MG: 1 INJECTION, SOLUTION INTRAMUSCULAR; INTRAVENOUS; SUBCUTANEOUS at 15:49

## 2021-12-10 RX ADMIN — IOPAMIDOL 100 ML: 612 INJECTION, SOLUTION INTRAVENOUS at 15:08

## 2021-12-10 NOTE — ED NOTES
PT received discharge instructions and verbalized understanding; Breathing even and non labored with no signs of distress; AOx4; GCS 15; Pt escorted off unit via wheelchair by this RN to meet wife/Tiara Lockett, RN  12/10/21 1576

## 2021-12-10 NOTE — TELEPHONE ENCOUNTER
"Pt's wife called the office, she stated  \"Denzel is having severe pain in his abdomen from his belly button to where his appendix is and I think we are going to take him to the emergency room.\"  I informed Dr. Del Cid (on call surgeon.)  "

## 2021-12-10 NOTE — ED PROVIDER NOTES
Subjective   History of Present Illness  Chief Complaint: Abdominal pain  History of Present Illness: 61-year-old male history umbilical hernia presents with right lower quadrant abdominal pain for a month, but worsened last night.  Sees Dr. Mcgowan for his umbilical hernia, was told to come to the ER for further evaluation by his office.  Mild nausea no vomiting, no diarrhea, no fevers.  Pain is localized to the right lower quadrant, without radiation, sharp in nature.  Onset: 1 month, worsened last night  Duration: Continuous  Exacerbating / Alleviating factors: Worsened with palpation, alleviated by nothing  Associated symptoms: None    Nurses Notes reviewed and agree, including vitals, allergies, social history and prior medical history.    REVIEW OF SYSTEMS: All systems reviewed and not pertinent unless noted.    Positive for: Abdominal pain, nausea    Negative for: Other systems reviewed and negative unless specified  Review of Systems    Past Medical History:   Diagnosis Date   • Anxiety    • Arthritis    • Asthma    • Brachial neuritis 1/19/2017   • Cellulitis     left arm and right leg    • Chest pain    • CHF (congestive heart failure) (Formerly [facility])     Patient reported history May 2020    • COPD (chronic obstructive pulmonary disease) (Formerly [facility])    • Coronary artery disease     Patient reported CABG , 3 vessel   • Depression    • Diabetes mellitus (Formerly [facility])     diagnosed in 1998, checks fsbg 3-4x/day   • Dizziness    • Edema    • Elevated cholesterol    • GERD (gastroesophageal reflux disease)    • H/O chest x-ray 07/04/2015    Mild Left base atelectasis   • H/O echocardiogram 07/05/2015    Normal LVSF. EF of 60-65%. Grade 1 diastolic dysfunction of the LV myocardium. No evidence of pericardial effusion   • H/O exercise stress test    • Hearing loss     No use of hearing aids   • History of fracture     Reported 2 bones in left foot and a finger   • History of herniated intervertebral disc     History of left L5-S1  disc herniation   • History of pneumonia    • History of pneumonia    • Hyperlipidemia    • Hypertension    • Impaired functional mobility, balance, gait, and endurance    • LOM (loss of memory) 1/19/2017   • Lower back pain     Right   • Measles    • MUSE (nonalcoholic steatohepatitis)    • Neuropathy     feet    • EDD treated with BiPAP     BiPAP HS - instructed to bring mask/machine DOS (setting 13 and 5)   • Palpitations    • Pericardial effusion    • Poor dentition     Patient reported missing multiple teeth   • Renal disorder    • Seizure disorder (HCC)     focal seizures   • SOB (shortness of breath)    • Staph infection     back after sugery at the incision site    • Stroke (HCC)     x2 most recent , slight weakness in hands occasionaly    • Wears glasses        Allergies   Allergen Reactions   • Ajovy [Fremanezumab-Vfrm] Other (See Comments)     Caused tightness of chest, vomiting, pain in both arms.   • Sulfa Antibiotics Anaphylaxis, Nausea And Vomiting and Delirium   • Invokana [Canagliflozin] Unknown - Low Severity     DKA   • Codeine Nausea And Vomiting     Can only take with Phenergan   • Morphine Unknown - Low Severity     Does not work. It causes pain       Past Surgical History:   Procedure Laterality Date   • BACK SURGERY     • CARDIAC CATHETERIZATION     • CARDIAC CATHETERIZATION N/A 8/11/2017    Procedure: Coronary angiography;  Surgeon: Dallas Kim MD;  Location: [facility] CATH INVASIVE LOCATION;  Service:    • CARDIAC CATHETERIZATION N/A 8/11/2017    Procedure: Left Heart Cath;  Surgeon: Dallas Kim MD;  Location: [facility] CATH INVASIVE LOCATION;  Service:    • CARDIAC CATHETERIZATION N/A 8/11/2017    Procedure: Left ventriculography;  Surgeon: Dallas Kim MD;  Location: [facility] INVASIVE LOCATION;  Service:    • CARDIAC CATHETERIZATION      2002 x1 stent,  x1 stent   • CARDIOVASCULAR STRESS TEST  07/03/2017    WITH DR KIM AT [facility]   • CARPAL TUNNEL RELEASE  Right    • CATARACT EXTRACTION W/ INTRAOCULAR LENS IMPLANT Right 6/12/2017    Procedure: CATARACT PHACO EXTRACTION WITH INTRAOCULAR LENS IMPLANT RIGHT ;  Surgeon: Natalie Cao MD;  Location: [facility] OR;  Service:    • CATARACT EXTRACTION W/ INTRAOCULAR LENS IMPLANT Left 7/10/2017    Procedure: CATARACT PHACO EXTRACTION WITH INTRAOCULAR LENS IMPLANT LEFT;  Surgeon: Natalie Cao MD;  Location: [facility] OR;  Service:    • CHOLECYSTECTOMY     • COLONOSCOPY     • COLONOSCOPY N/A 7/2/2020    Procedure: COLONOSCOPY;  Surgeon: Petra Crowell MD;  Location: [facility] ENDOSCOPY;  Service: Gastroenterology;  Laterality: N/A;  poor prep   • CORONARY ANGIOPLASTY WITH STENT PLACEMENT      X1-LAD   • CORONARY ARTERY BYPASS GRAFT N/A 8/15/2017    Procedure: CORONARY ARTERY BYPASS GRAFTING x 3 UTILIZING THE LEFT INTERNAL MAMMARY ARTERY WITH ENDOSCOPIC VEIN HARVESTING OF THE RIGHT GREATER SAPHENOUS VEIN, HAMLET, LAD ENDARECTOMY;  Surgeon: London Damon MD;  Location: [facility] OR;  Service:    • ENDOSCOPY     • EYE CAPSULOTOMY WITH LASER Right 11/25/2019    Procedure: EYE CAPSULOTOMY WITH LASER RIGHT;  Surgeon: Natalie Cao MD;  Location: [facility] OR;  Service: Ophthalmology   • EYE CAPSULOTOMY WITH LASER Left 12/9/2019    Procedure: EYE CAPSULOTOMY WITH LASER LEFT;  Surgeon: Natalie Cao MD;  Location: [facility] OR;  Service: Ophthalmology   • EYE SURGERY      cataract surgery both eyes   • INTERVENTIONAL RADIOLOGY PROCEDURE Bilateral 12/31/2019    Procedure: Carotid Cerebral Angiogram;  Surgeon: Damian Dubois MD;  Location: [facility] CATH INVASIVE LOCATION;  Service: Interventional Radiology   • KNEE ARTHROSCOPY Bilateral    • KNEE ARTHROSCOPY Right 2010    Dr Lewis   • KNEE ARTHROSCOPY Left 2008    Dr Jameson   • KNEE MENISCECTOMY Right 10/15/2020    Procedure: Right knee arthroscopy with partial medial and lateral meniscectomy and three compartment chondroplasty.;  Surgeon: South Lewis MD;  Location: [facility] OR;  Service:  Orthopedics;  Laterality: Right;   • MOUTH SURGERY      Oral extractions   • NEUROPLASTY / TRANSPOSITION ULNAR NERVE AT ELBOW Left    • OTHER SURGICAL HISTORY      Foraminotomy and discectomy   • PERICARDIAL WINDOW N/A 2017    Procedure: PERICARDIAL WINDOW;  Surgeon: Freeman Phillips MD;  Location: [facility];  Service:        Family History   Problem Relation Age of Onset   • Hypertension Mother    • Arthritis Mother    • Stomach cancer Mother    • Alcohol abuse Father    • Heart disease Other    • Hypertension Other    • Other Other         Neurologic disorder   • Heart attack Other    • Parkinsonism Other    • Stroke Other    • Heart disease Other    • Hypertension Other    • Heart disease Other    • Stroke Other    • Hypertension Other    • Colon cancer Neg Hx        Social History     Socioeconomic History   • Marital status:    • Number of children: 1   Tobacco Use   • Smoking status: Former Smoker     Packs/day: 1.00     Years: 10.00     Pack years: 10.00     Types: Cigarettes     Quit date: 1998     Years since quittin.9   • Smokeless tobacco: Former User     Types: Snuff     Quit date:    Substance and Sexual Activity   • Alcohol use: Yes     Comment: 3 drinks a year   • Drug use: No   • Sexual activity: Defer           Objective   Physical Exam  Constitutional:       General: He is not in acute distress.     Appearance: He is obese. He is not ill-appearing, toxic-appearing or diaphoretic.   HENT:      Head: Normocephalic and atraumatic.   Eyes:      Extraocular Movements: Extraocular movements intact.      Pupils: Pupils are equal, round, and reactive to light.   Cardiovascular:      Rate and Rhythm: Normal rate and regular rhythm.      Heart sounds: Normal heart sounds.   Pulmonary:      Effort: Pulmonary effort is normal.      Breath sounds: Normal breath sounds.   Abdominal:      General: Bowel sounds are normal. There is distension.      Palpations: Abdomen is soft.       Tenderness: There is abdominal tenderness in the right lower quadrant, suprapubic area and left lower quadrant.      Hernia: A hernia is present. Hernia is present in the umbilical area.      Comments: Mild diffuse distention   Skin:     General: Skin is warm and dry.   Neurological:      General: No focal deficit present.      Mental Status: He is alert and oriented to person, place, and time.   Psychiatric:         Mood and Affect: Mood normal.         Behavior: Behavior normal.         Procedures           ED Course  ED Course as of 12/10/21 1526   Fri Dec 10, 2021   1428 WBC: 7.27 [CC]   1428 RBC: 5.16 [CC]   1428 Hemoglobin: 13.5 [CC]   1428 Hematocrit: 41.3 [CC]   1428 Platelets: 194 [CC]   1452 Bacteria, UA(!): Trace [CC]   1452 WBC, UA(!): 0-2 [CC]   1452 Leukocytes, UA(!): Trace [CC]   1452 Lactate: 1.9 [CC]   1452 Lipase: 20 [CC]      ED Course User Index  [CC] Louis Blackwell PA                                                 MDM  Number of Diagnoses or Management Options  Diagnosis management comments: 61-year-old male stable condition with complaints of right lower quadrant abdominal pain and umbilical hernia.  Labs are unremarkable, urine does show a small urinary tract infection still discharged with cefdinir.  CT scan shows a small umbilical hernia but no evidence of strangulation or gangrene, that is able to be reduced on physical exam.  CT otherwise shows no acute abnormalities.  Will discharge with pain medicine, nausea medicine and cefdinir and advised follow-up with a general surgeon for follow-up on umbilical hernia.  He is made aware of return precautions.       Amount and/or Complexity of Data Reviewed  Clinical lab tests: reviewed  Tests in the radiology section of CPT®: reviewed    Risk of Complications, Morbidity, and/or Mortality  Presenting problems: moderate  Diagnostic procedures: low  Management options: moderate        Final diagnoses:   Umbilical hernia without obstruction and  without gangrene   Acute cystitis without hematuria       ED Disposition  ED Disposition     ED Disposition Condition Comment    Discharge Stable           Isauar Godoy MD  [address]  [address]  [facility] [address]  [phone]    Go in 3 days  As needed, If symptoms worsen    [facility] Emergency Department  [address] [facility] [id number] [phone]  Go to   As needed, If symptoms worsen    Demarcus Del Cid MD  [address] [facility]  [address]  [facility] [address]  [phone]    Schedule an appointment as soon as possible for a visit            Medication List      New Prescriptions    cefdinir 300 MG capsule  Commonly known as: OMNICEF  Take 1 capsule by mouth 2 (Two) Times a Day for 7 days.     ketorolac 10 MG tablet  Commonly known as: TORADOL  Take 1 tablet by mouth Every 6 (Six) Hours As Needed for Moderate Pain .     promethazine 12.5 MG tablet  Commonly known as: PHENERGAN  Take 1 tablet by mouth Every 6 (Six) Hours As Needed for Nausea or Vomiting.        Changed    omeprazole 20 MG capsule  Commonly known as: priLOSEC  Take 1 capsule by mouth Daily.  What changed: when to take this           Where to Get Your Medications      These medications were sent to [facility] [address] [place] [address] - [address] [place] [address] - [phone]  - [phone]   [address] [facility] [address]    Phone: [phone]   · cefdinir 300 MG capsule  · ketorolac 10 MG tablet  · promethazine 12.5 MG tablet          Louis Blackwell PA  12/10/21 1527

## 2021-12-13 ENCOUNTER — LAB (OUTPATIENT)
Dept: LAB | Facility: HOSPITAL | Age: 61
End: 2021-12-13

## 2021-12-13 ENCOUNTER — DOCUMENTATION (OUTPATIENT)
Dept: PULMONOLOGY | Facility: CLINIC | Age: 61
End: 2021-12-13

## 2021-12-13 DIAGNOSIS — R91.8 GROUND GLASS OPACITY PRESENT ON IMAGING OF LUNG: ICD-10-CM

## 2021-12-13 DIAGNOSIS — Z01.818 PREOPERATIVE CLEARANCE: ICD-10-CM

## 2021-12-13 PROCEDURE — U0004 COV-19 TEST NON-CDC HGH THRU: HCPCS

## 2021-12-13 PROCEDURE — C9803 HOPD COVID-19 SPEC COLLECT: HCPCS

## 2021-12-13 RX ORDER — PREDNISONE 20 MG/1
TABLET ORAL
Qty: 10 TABLET | Refills: 0 | Status: SHIPPED | OUTPATIENT
Start: 2021-12-13 | End: 2022-01-26

## 2021-12-13 NOTE — PROGRESS NOTES
The patient was last seen in our clinic in September 16, 2021.  At that time, his asthma symptoms were under adequate control on maximum therapy.  His oxygen saturation was 90% on room air however he does have oxygen ordered continuously.    He is on BiPAP at a pressure of 13/5 with oxygen bled into the machine at night.  He has been compliant with BiPAP in the past at greater than 70%.    He is seeking preoperative risk stratification for umbilical hernia surgery.    Patient is medium to high risk for the proposed procedure from Pulmonary stand point.    Post Operative recommendations:            * Out of bed to chair, as soon as feasible.            * Albuterol & Atrovent nebulizers Q4hr PRN            * Incentive spirometry every hour.            * Minimize the use of NG tube.            * Keep O2sat at 88% or more, with minimum supplemental O2.            * Minimize anesthesia time, as much as possible            * Inform anesthesiologist of the diagnosis of EDD.             * She may benefit from ABG, pre and/or post Op, if clinically needed.     The patient should be started on prednisone 2 days prior to surgery and should continue the medication the day of surgery in 2 days after the surgery.    He should bring his BiPAP machine with him on the day of surgery.    If the patient has any changes in his respiratory status prior to the surgery date, he should call for respiratory reevaluation prior to surgery.

## 2021-12-14 LAB — SARS-COV-2 RNA NOSE QL NAA+PROBE: NOT DETECTED

## 2021-12-14 NOTE — PROGRESS NOTES
Patient: Denzel Harding    YOB: 1960    Date: 12/15/2021    Primary Care Provider: Isaura Godoy MD    Chief Complaint   Patient presents with   • Follow-up     hernia umbilical       SUBJECTIVE:    History of present illness:  Patient has a history significant for CAD, CHF and COPD, he is on oxygen 24 hours per day and he is s/p CABG.  I saw the patient in the office today as a follow-up pulmonary clearance which was requested prior to umbilical hernia repair.  Patient was seen in the office recently at which time he complained of a painful, palpable mass at his umbilicus.  Ultrasound was performed which showed a 1.1 cm umbilical defect containing omentum.    He does have a longstanding history of the above-mentioned hernia.  He also has pulmonary issues and has been cleared from pulmonary standpoint although he is at higher risk.    The following portions of the patient's history were reviewed and updated as appropriate: allergies, current medications, past family history, past medical history, past social history, past surgical history and problem list.    Review of Systems   Constitutional: Negative for chills, fever and unexpected weight change.   HENT: Negative for trouble swallowing and voice change.    Eyes: Negative for visual disturbance.   Respiratory: Negative for apnea, cough, chest tightness, shortness of breath and wheezing.    Cardiovascular: Negative for chest pain, palpitations and leg swelling.   Gastrointestinal: Negative for abdominal distention, abdominal pain, anal bleeding, blood in stool, constipation, diarrhea, nausea, rectal pain and vomiting.   Endocrine: Negative for cold intolerance and heat intolerance.   Genitourinary: Negative for difficulty urinating, dysuria, flank pain, scrotal swelling and testicular pain.   Musculoskeletal: Negative for back pain, gait problem and joint swelling.   Skin: Negative for color change, rash and wound.   Neurological: Negative for  dizziness, syncope, speech difficulty, weakness, numbness and headaches.   Hematological: Negative for adenopathy. Does not bruise/bleed easily.   Psychiatric/Behavioral: Negative for confusion. The patient is not nervous/anxious.        History:  Past Medical History:   Diagnosis Date   • Anxiety    • Arthritis    • Asthma    • Brachial neuritis 1/19/2017   • Cellulitis     left arm and right leg    • Chest pain    • CHF (congestive heart failure) ([facility])     Patient reported history May 2020    • COPD (chronic obstructive pulmonary disease) ([facility])    • Coronary artery disease     Patient reported CABG , 3 vessel   • Depression    • Diabetes mellitus ([facility])     diagnosed in 1998, checks fsbg 3-4x/day   • Dizziness    • Edema    • Elevated cholesterol    • GERD (gastroesophageal reflux disease)    • H/O chest x-ray 07/04/2015    Mild Left base atelectasis   • H/O echocardiogram 07/05/2015    Normal LVSF. EF of 60-65%. Grade 1 diastolic dysfunction of the LV myocardium. No evidence of pericardial effusion   • H/O exercise stress test    • Hearing loss     No use of hearing aids   • History of fracture     Reported 2 bones in left foot and a finger   • History of herniated intervertebral disc     History of left L5-S1 disc herniation   • History of pneumonia    • History of pneumonia    • Hyperlipidemia    • Hypertension    • Impaired functional mobility, balance, gait, and endurance    • LOM (loss of memory) 1/19/2017   • Lower back pain     Right   • Measles    • MUSE (nonalcoholic steatohepatitis)    • Neuropathy     feet    • EDD treated with BiPAP     BiPAP HS - instructed to bring mask/machine DOS (setting 13 and 5)   • Palpitations    • Pericardial effusion    • Poor dentition     Patient reported missing multiple teeth   • Renal disorder    • Seizure disorder ([facility])     focal seizures   • SOB (shortness of breath)    • Staph infection     back after sugery at the incision site    • Stroke ([facility])     x2 most  recent , slight weakness in hands occasionaly    • Wears glasses        Past Surgical History:   Procedure Laterality Date   • BACK SURGERY     • CARDIAC CATHETERIZATION     • CARDIAC CATHETERIZATION N/A 8/11/2017    Procedure: Coronary angiography;  Surgeon: Dallas Hernandez MD;  Location: [facility] CATH INVASIVE LOCATION;  Service:    • CARDIAC CATHETERIZATION N/A 8/11/2017    Procedure: Left Heart Cath;  Surgeon: Dallas Hernandez MD;  Location: [facility] CATH INVASIVE LOCATION;  Service:    • CARDIAC CATHETERIZATION N/A 8/11/2017    Procedure: Left ventriculography;  Surgeon: Dallas Hernandez MD;  Location: [facility] CATH INVASIVE LOCATION;  Service:    • CARDIAC CATHETERIZATION      2002 x1 stent,  x1 stent   • CARDIOVASCULAR STRESS TEST  07/03/2017    WITH DR HERNANDEZ AT [facility]   • CARPAL TUNNEL RELEASE Right    • CATARACT EXTRACTION W/ INTRAOCULAR LENS IMPLANT Right 6/12/2017    Procedure: CATARACT PHACO EXTRACTION WITH INTRAOCULAR LENS IMPLANT RIGHT ;  Surgeon: Natalie Cao MD;  Location: [facility] OR;  Service:    • CATARACT EXTRACTION W/ INTRAOCULAR LENS IMPLANT Left 7/10/2017    Procedure: CATARACT PHACO EXTRACTION WITH INTRAOCULAR LENS IMPLANT LEFT;  Surgeon: Natalie Cao MD;  Location: [facility] OR;  Service:    • CHOLECYSTECTOMY     • COLONOSCOPY     • COLONOSCOPY N/A 7/2/2020    Procedure: COLONOSCOPY;  Surgeon: Petra Crowell MD;  Location: [facility] ENDOSCOPY;  Service: Gastroenterology;  Laterality: N/A;  poor prep   • CORONARY ANGIOPLASTY WITH STENT PLACEMENT      X1-LAD   • CORONARY ARTERY BYPASS GRAFT N/A 8/15/2017    Procedure: CORONARY ARTERY BYPASS GRAFTING x 3 UTILIZING THE LEFT INTERNAL MAMMARY ARTERY WITH ENDOSCOPIC VEIN HARVESTING OF THE RIGHT GREATER SAPHENOUS VEIN, HAMLET, LAD ENDARECTOMY;  Surgeon: [place] Damon MD;  Location: [facility] OR;  Service:    • ENDOSCOPY     • EYE CAPSULOTOMY WITH LASER Right 11/25/2019    Procedure: EYE CAPSULOTOMY WITH LASER RIGHT;  Surgeon: Natalie Cao  MD;  Location:  [facility] OR;  Service: Ophthalmology   • EYE CAPSULOTOMY WITH LASER Left 2019    Procedure: EYE CAPSULOTOMY WITH LASER LEFT;  Surgeon: Natalie Cao MD;  Location:  [facility] OR;  Service: Ophthalmology   • EYE SURGERY      cataract surgery both eyes   • INTERVENTIONAL RADIOLOGY PROCEDURE Bilateral 2019    Procedure: Carotid Cerebral Angiogram;  Surgeon: Damian Dubois MD;  Location:  [facility] CATH INVASIVE LOCATION;  Service: Interventional Radiology   • KNEE ARTHROSCOPY Bilateral    • KNEE ARTHROSCOPY Right     Dr Lewis   • KNEE ARTHROSCOPY Left     Dr Jameson   • KNEE MENISCECTOMY Right 10/15/2020    Procedure: Right knee arthroscopy with partial medial and lateral meniscectomy and three compartment chondroplasty.;  Surgeon: [place] Lewis MD;  Location: [facility] OR;  Service: Orthopedics;  Laterality: Right;   • MOUTH SURGERY      Oral extractions   • NEUROPLASTY / TRANSPOSITION ULNAR NERVE AT ELBOW Left    • OTHER SURGICAL HISTORY      Foraminotomy and discectomy   • PERICARDIAL WINDOW N/A 2017    Procedure: PERICARDIAL WINDOW;  Surgeon: Freeman Phillips MD;  Location:  [facility] OR;  Service:        Family History   Problem Relation Age of Onset   • Hypertension Mother    • Arthritis Mother    • Stomach cancer Mother    • Alcohol abuse Father    • Heart disease Other    • Hypertension Other    • Other Other         Neurologic disorder   • Heart attack Other    • Parkinsonism Other    • Stroke Other    • Heart disease Other    • Hypertension Other    • Heart disease Other    • Stroke Other    • Hypertension Other    • Colon cancer Neg Hx        Social History     Tobacco Use   • Smoking status: Former Smoker     Packs/day: 1.00     Years: 10.00     Pack years: 10.00     Types: Cigarettes     Quit date: 1998     Years since quittin.9   • Smokeless tobacco: Former User     Types: Snuff     Quit date:    Substance Use Topics   • Alcohol use: Yes     Comment: 3 drinks a  year   • Drug use: No       Allergies:  Allergies   Allergen Reactions   • Ajovy [Fremanezumab-Vfrm] Other (See Comments)     Caused tightness of chest, vomiting, pain in both arms.   • Sulfa Antibiotics Anaphylaxis, Nausea And Vomiting and Delirium   • Invokana [Canagliflozin] Unknown - Low Severity     DKA   • Codeine Nausea And Vomiting     Can only take with Phenergan   • Morphine Unknown - Low Severity     Does not work. It causes pain       Medications:    Current Outpatient Medications:   •  albuterol sulfate HFA (Ventolin HFA) 108 (90 Base) MCG/ACT inhaler, Inhale 2 puffs Every 4 (Four) Hours As Needed for Wheezing or Shortness of Air., Disp: 8 g, Rfl: 5  •  amitriptyline (ELAVIL) 25 MG tablet, TAKE 3 TABLETS BY MOUTH EVERY NIGHT AT BEDTIME, Disp: 270 tablet, Rfl: 0  •  aspirin  MG tablet, Take 1 tablet by mouth Daily., Disp: 30 tablet, Rfl: 0  •  atorvastatin (LIPITOR) 80 MG tablet, Take 1 tablet by mouth Every Night., Disp: 90 tablet, Rfl: 1  •  azelastine (ASTELIN) 0.1 % nasal spray, 1 spray into the nostril(s) as directed by provider 2 (Two) Times a Day As Needed for Rhinitis or Allergies. Use in each nostril as directed, Disp: 1 each, Rfl: 5  •  B-D ULTRAFINE III SHORT PEN 31G X 8 MM misc, 2 (Two) Times a Day., Disp: , Rfl:   •  budesonide-formoterol (Symbicort) 80-4.5 MCG/ACT inhaler, Inhale 2 puffs 2 (Two) Times a Day. Rinse mouth with water after use., Disp: 1 each, Rfl: 3  •  cefdinir (OMNICEF) 300 MG capsule, Take 1 capsule by mouth 2 (Two) Times a Day for 7 days., Disp: 14 capsule, Rfl: 0  •  ciprofloxacin (CILOXAN) 0.3 % ophthalmic solution, INSTILL 5 DROPS INTO LEFT EAR TWICE DAILY FOR 7 DAYS, Disp: , Rfl:   •  clopidogrel (PLAVIX) 75 MG tablet, Take 1 tablet by mouth Daily., Disp: 90 tablet, Rfl: 1  •  coenzyme Q10 100 MG capsule, Take 100 mg by mouth Daily., Disp: , Rfl:   •  docusate sodium (Colace) 100 MG capsule, Take 1 capsule by mouth 2 (Two) Times a Day As Needed for Constipation.,  Disp: 60 capsule, Rfl: 3  •  Dupixent 300 MG/2ML solution prefilled syringe, INJECT 300 MG SUBCUTANEOUSLY EVERY OTHER WEEK, Disp: 4 each, Rfl: 4  •  gabapentin (NEURONTIN) 600 MG tablet, Take 1 tablet by mouth 3 (Three) Times a Day., Disp: 270 tablet, Rfl: 3  •  HumuLIN R U-500 KwikPen 500 UNIT/ML solution pen-injector CONCENTRATED injection, Inject 190 Units under the skin into the appropriate area as directed Every Morning. Takes around 9AM, Disp: , Rfl:   •  Insulin Regular Human, Conc, (HumuLIN R U-500 KwikPen) 500 UNIT/ML solution pen-injector CONCENTRATED injection, Inject 180 Units under the skin into the appropriate area as directed Every Evening. Takes at 3PM, Disp: , Rfl:   •  Insulin Regular Human, Conc, (HumuLIN R U-500 KwikPen) 500 UNIT/ML solution pen-injector CONCENTRATED injection, Inject 180 Units under the skin into the appropriate area as directed Every Night. Patient takes at 8PM, Disp: , Rfl:   •  ipratropium-albuterol (DUO-NEB) 0.5-2.5 mg/3 ml nebulizer, Take 3 mL by nebulization 4 (Four) Times a Day As Needed for Wheezing or Shortness of Air., Disp: 360 mL, Rfl: 5  •  ketorolac (TORADOL) 10 MG tablet, Take 1 tablet by mouth Every 6 (Six) Hours As Needed for Moderate Pain ., Disp: 12 tablet, Rfl: 0  •  lamoTRIgine (LaMICtal) 100 MG tablet, Take 100 mg by mouth 2 (Two) Times a Day., Disp: , Rfl:   •  Lancets (OneTouch Delica Plus [id number]) misc, , Disp: , Rfl:   •  levETIRAcetam (KEPPRA) 1000 MG tablet, Take 1,000 mg by mouth 2 (Two) Times a Day., Disp: , Rfl:   •  levocetirizine (XYZAL) 5 MG tablet, Take 1 tablet by mouth Every Evening., Disp: 90 tablet, Rfl: 3  •  lisinopril (PRINIVIL,ZESTRIL) 5 MG tablet, Take 1 tablet by mouth Daily., Disp: 90 tablet, Rfl: 1  •  meclizine (ANTIVERT) 25 MG tablet, Take 1 tablet by mouth 3 (Three) Times a Day As Needed for dizziness., Disp: 10 tablet, Rfl: 0  •  metoprolol succinate XL (TOPROL-XL) 200 MG 24 hr tablet, Take 1 tablet by mouth Daily, Disp: 90  tablet, Rfl: 2  •  mupirocin (Bactroban) 2 % cream, Apply 1 application topically to the appropriate area as directed 3 (Three) Times a Day., Disp: 15 g, Rfl: 2  •  Nebulizer device, 1 Device Take As Directed., Disp: 1 each, Rfl: 0  •  nitroglycerin (NITROLINGUAL) 0.4 MG/SPRAY spray, Place 1 spray under the tongue Every 5 (Five) Minutes As Needed for Chest Pain., Disp: 4.9 g, Rfl: 0  •  nortriptyline (PAMELOR) 25 MG capsule, Take 25 mg by mouth., Disp: , Rfl:   •  nortriptyline (PAMELOR) 50 MG capsule, Take 50 mg by mouth., Disp: , Rfl:   •  omeprazole (priLOSEC) 20 MG capsule, Take 1 capsule by mouth Daily. (Patient taking differently: Take 20 mg by mouth Every Night.), Disp: 90 capsule, Rfl: 0  •  OneTouch Verio test strip, , Disp: , Rfl:   •  predniSONE (DELTASONE) 20 MG tablet, Take 2 tablets daily for 5 days. This should be started 2 days prior to umbilical hernia surgery and continued 5 days in a row., Disp: 10 tablet, Rfl: 0  •  promethazine (PHENERGAN) 12.5 MG tablet, Take 1 tablet by mouth Every 6 (Six) Hours As Needed for Nausea or Vomiting., Disp: 12 tablet, Rfl: 0  •  Semaglutide, 1 MG/DOSE, (Ozempic, 1 MG/DOSE,) 2 MG/1.5ML solution pen-injector, Inject 1 mg under the skin into the appropriate area as directed 1 (One) Time Per Week., Disp: 9 mL, Rfl: 3  •  spironolactone (ALDACTONE) 25 MG tablet, Take 1 tablet by mouth Daily., Disp: 90 tablet, Rfl: 1  •  Tiotropium Bromide Monohydrate (Spiriva Respimat) 1.25 MCG/ACT aerosol solution inhaler, Inhale 2 puffs Daily., Disp: 4 g, Rfl: 5  •  TURMERIC PO, Take 500 mg by mouth 2 (Two) Times a Day., Disp: , Rfl:   •  ubrogepant (UBRELVY) 50 MG tablet, Take 50 mg by mouth., Disp: , Rfl:   •  vitamin C (ASCORBIC ACID) 500 MG tablet, Take 500 mg by mouth Daily., Disp: , Rfl:   •  vitamin D3 125 MCG (5000 UT) capsule capsule, Take 5,000 Units by mouth Daily., Disp: , Rfl:   •  vitamin E 400 UNIT capsule, Take 1 capsule by mouth 2 (two) times a day., Disp: 60 capsule,  "Rfl: 5  •  Vortioxetine HBr (Trintellix) 20 MG tablet, Take 20 mg by mouth Daily., Disp: 90 tablet, Rfl: 0  •  zafirlukast (Accolate) 20 MG tablet, Take 1 tablet by mouth 2 (Two) Times a Day., Disp: 60 tablet, Rfl: 5  •  isosorbide mononitrate (IMDUR) 60 MG 24 hr tablet, Take 1 tablet by mouth Daily, Disp: 90 tablet, Rfl: 3    OBJECTIVE:    Vital Signs:   Vitals:    12/15/21 1553   BP: 144/80   Pulse: 89   Resp: 16   Temp: 97.5 °F (36.4 °C)   TempSrc: Temporal   SpO2: 96%   Weight: 123 kg (272 lb)   Height: 177.8 cm (70\")       Physical Exam:   General Appearance:    Alert, cooperative, in no acute distress   Head:    Normocephalic, without obvious abnormality, atraumatic   Eyes:            Lids and lashes normal, conjunctivae and sclerae normal, no   icterus, no pallor, corneas clear, PERRLA   Ears:    Ears appear intact with no abnormalities noted   Throat:   No oral lesions, no thrush, oral mucosa moist   Neck:   No adenopathy, supple, trachea midline, no thyromegaly, no   carotid bruit, no JVD   Lungs:     Clear to auscultation,respirations regular, even and                  unlabored    Heart:    Regular rhythm and normal rate, normal S1 and S2, no            murmur   Abdomen:     no masses, no organomegaly, soft non-tender, non-distended, no guarding, there is evidence of a incarcerated incisional hernia located at the umbilicus   Extremities:   Moves all extremities well, no edema, no cyanosis, no             redness   Pulses:   Pulses palpable and equal bilaterally   Skin:   No bleeding, bruising or rash   Lymph nodes:   No palpable adenopathy   Neurologic:   Cranial nerves 2 - 12 grossly intact, sensation intact        Results Review:   I reviewed the patient's new clinical results.  I reviewed the patient's new imaging results and agree with the interpretation.  I reviewed the patient's other test results and agree with the interpretation    Review of Systems was reviewed and confirmed as accurate as " documented by the MA.    ASSESSMENT/PLAN:    1. Incisional hernia with obstruction but no gangrene        I had a detailed and extensive discussion with the patient in the office and they understand that they need to undergo hernia repair with mesh.  Full risks and benefits of operative versus nonoperative intervention were discussed with the patient and these included things such as nonresolution of symptoms and possible worsening of symptoms without surgical intervention versus infection, bleeding, possible recurrent hernia, possible postoperative neuralgia from nerve damage or involvement with scar tissue, etc.  The patient understands, agrees, and had no questions for me at the end of the office visit.     I discussed the patients findings and my recommendations with patient and the wife in the office today.  They both understand the patient's risk for surgical intervention and the need for the patient to take prednisone before and after surgical intervention as prescribed by the pulmonologist.    Electronically signed by Torres Kim MD  12/15/21

## 2021-12-15 ENCOUNTER — OFFICE VISIT (OUTPATIENT)
Dept: SURGERY | Facility: CLINIC | Age: 61
End: 2021-12-15

## 2021-12-15 VITALS
RESPIRATION RATE: 16 BRPM | HEART RATE: 89 BPM | TEMPERATURE: 97.5 F | SYSTOLIC BLOOD PRESSURE: 144 MMHG | DIASTOLIC BLOOD PRESSURE: 80 MMHG | WEIGHT: 272 LBS | BODY MASS INDEX: 38.94 KG/M2 | OXYGEN SATURATION: 96 % | HEIGHT: 70 IN

## 2021-12-15 DIAGNOSIS — K43.0 INCISIONAL HERNIA WITH OBSTRUCTION BUT NO GANGRENE: Primary | ICD-10-CM

## 2021-12-15 DIAGNOSIS — Z01.818 PRE-OP TESTING: Primary | ICD-10-CM

## 2021-12-15 PROCEDURE — 99213 OFFICE O/P EST LOW 20 MIN: CPT | Performed by: SURGERY

## 2021-12-15 RX ORDER — TRAMADOL HYDROCHLORIDE 50 MG/1
50 TABLET ORAL 2 TIMES DAILY PRN
Qty: 20 TABLET | Refills: 0 | Status: SHIPPED | OUTPATIENT
Start: 2021-12-15 | End: 2022-01-26

## 2021-12-15 RX ORDER — NORTRIPTYLINE HYDROCHLORIDE 50 MG/1
50 CAPSULE ORAL NIGHTLY
Status: ON HOLD | COMMUNITY
Start: 2021-11-30 | End: 2022-01-01

## 2021-12-15 RX ORDER — NORTRIPTYLINE HYDROCHLORIDE 25 MG/1
25 CAPSULE ORAL
Status: ON HOLD | COMMUNITY
Start: 2021-12-07 | End: 2022-01-01

## 2021-12-16 PROBLEM — K43.0 INCISIONAL HERNIA WITH OBSTRUCTION BUT NO GANGRENE: Status: ACTIVE | Noted: 2021-12-16

## 2021-12-22 ENCOUNTER — TELEPHONE (OUTPATIENT)
Dept: SURGERY | Facility: CLINIC | Age: 61
End: 2021-12-22

## 2021-12-22 NOTE — TELEPHONE ENCOUNTER
Called to confirm ventral hernia rep , 12/29/21, Dr Kim, @ [facility] had to leave voice tim

## 2021-12-27 ENCOUNTER — PRE-ADMISSION TESTING (OUTPATIENT)
Dept: PREADMISSION TESTING | Facility: HOSPITAL | Age: 61
End: 2021-12-27

## 2021-12-27 ENCOUNTER — TELEPHONE (OUTPATIENT)
Dept: SURGERY | Facility: CLINIC | Age: 61
End: 2021-12-27

## 2021-12-27 VITALS — WEIGHT: 308 LBS | BODY MASS INDEX: 44.09 KG/M2 | HEIGHT: 70 IN

## 2021-12-27 DIAGNOSIS — Z01.818 PRE-OP TESTING: Primary | ICD-10-CM

## 2021-12-27 PROCEDURE — C9803 HOPD COVID-19 SPEC COLLECT: HCPCS

## 2021-12-27 PROCEDURE — U0004 COV-19 TEST NON-CDC HGH THRU: HCPCS

## 2021-12-27 NOTE — DISCHARGE INSTRUCTIONS
PAT PASS GIVEN/REVIEWED WITH PT.  VERBALIZED UNDERSTANDING OF THE FOLLOWING:  DO NOT EAT, DRINK, SMOKE, USE SMOKELESS TOBACCO OR CHEW GUM AFTER MIDNIGHT THE NIGHT BEFORE SURGERY.  THIS ALSO INCLUDES HARD CANDIES AND MINTS.    DO NOT SHAVE THE AREA TO BE OPERATED ON AT LEAST 48 HOURS PRIOR TO THE PROCEDURE.  DO NOT WEAR MAKE UP OR NAIL POLISH.  DO NOT LEAVE IN ANY PIERCING OR WEAR JEWELRY THE DAY OF SURGERY.      DO NOT USE ADHESIVES IF YOU WEAR DENTURES.    DO NOT WEAR EYE CONTACTS; BRING IN YOUR GLASSES.    ONLY TAKE MEDICATION THE MORNING OF YOUR PROCEDURE IF INSTRUCTED BY YOUR SURGEON WITH ENOUGH WATER TO SWALLOW THE MEDICATION.  IF YOUR SURGEON DID NOT SPECIFY WHICH MEDICATIONS TO TAKE, YOU WILL NEED TO CALL THEIR OFFICE FOR FURTHER INSTRUCTIONS AND DO AS THEY INSTRUCT.    LEAVE ANYTHING YOU CONSIDER VALUABLE AT HOME.    YOU WILL NEED TO ARRANGE FOR SOMEONE TO DRIVE YOU HOME AFTER SURGERY.  IT IS RECOMMENDED THAT YOU DO NOT DRIVE, WORK, DRINK ALCOHOL OR MAKE MAJOR DECISIONS FOR AT LEAST 24 HOURS AFTER YOUR PROCEDURE IS COMPLETE.      THE DAY OF YOUR PROCEDURE, BRING IN THE FOLLOWING IF APPLICABLE:   PICTURE ID AND INSURANCE/MEDICARE OR MEDICAID CARDS/ANY CO-PAY THAT MAY BE DUE   COPY OF ADVANCED DIRECTIVE/LIVING WILL/POWER OR    CPAP/BIPAP/INHALERS   SKIN PREP SHEET   YOUR PREADMISSION TESTING PASS (IF NOT A PHONE HISTORY)        Chlorhexidine wipes along with instruction/verification sheet given to pt.  Instructed pt to date, time, and initial the verification sheet once skin prep has been  completed, and to return to [facility] the day of the procedure.  Pt. Verbalizes understanding.      COVID self-quarantine instructions reviewed with the pt.  Verbalized understanding.

## 2021-12-27 NOTE — PAT
"PT here for PAT appt for scheduled procedure on 12/29 for Dr. Kim. Seen in ER for Chest pain on 11/25/2021. Post ER visit pt to follow up with cardiology which scheduled 02/21/2022. Arben Grace CRNA notified of prior ER visit along with pt denial of CP or SOA post ER visit, along with pulmonary consult with \"moderate to high risk\" and pulmonary recommendation of prednisone start prior to procedure, as well as pt heart and lung history.  ASHLEE Grace request pt see cardiology prior to surgery. PT notified reagarding anesthesia recommendation and follow up of Dr. Kim office with patient.     Call Dr. Kim office to notify. Message left with Patti Peres to return call.   "

## 2021-12-28 ENCOUNTER — TELEPHONE (OUTPATIENT)
Dept: CARDIOLOGY | Facility: CLINIC | Age: 61
End: 2021-12-28

## 2021-12-28 ENCOUNTER — TELEPHONE (OUTPATIENT)
Dept: SURGERY | Facility: CLINIC | Age: 61
End: 2021-12-28

## 2021-12-28 LAB — SARS-COV-2 RNA PNL SPEC NAA+PROBE: NOT DETECTED

## 2021-12-28 NOTE — TELEPHONE ENCOUNTER
Pt is having hernia repair by Dr. Kim tomorrow 12/29/2021 and needs cardiac clearance. Pt recently seen  11/1/2021,. He Is currently taken a full Aspirin and Plavix.     Please advise if Pt is ok to be cleared for surgery.

## 2021-12-28 NOTE — TELEPHONE ENCOUNTER
"I talked with Brittni, she stated \"Dr. Jiménez is out of the office, I forwarded this to another Dr in our office.\"  In the meantime, Rhonda with [facility] PAT called, she stated \"anesthesia says that Mr. Harding must be seen by cardiologist, he cannot simply get a cardiac clearance over the phone because he stated that he was having chest pain.\"  I called Brittni again and asked her to return my call.  "

## 2021-12-29 ENCOUNTER — TELEPHONE (OUTPATIENT)
Dept: CARDIOLOGY | Facility: CLINIC | Age: 61
End: 2021-12-29

## 2021-12-29 ENCOUNTER — TELEPHONE (OUTPATIENT)
Dept: SURGERY | Facility: CLINIC | Age: 61
End: 2021-12-29

## 2021-12-29 NOTE — TELEPHONE ENCOUNTER
Pt's wife called about Pt's surgery clearance. I informed her that we sent a clearance letter through [employer] to Dr. Kim's office yesterday stating the Pt was cleared for surgery.

## 2021-12-29 NOTE — TELEPHONE ENCOUNTER
Cardiac clearance granted by Dr. Ponce, per Dr. Kim (and anesthesia) it is ok to reschedule hernia repair surgery.

## 2021-12-29 NOTE — TELEPHONE ENCOUNTER
office has called regarding recent letter of cardiac clearance placed in epic per  since  is unavailable.  General surgery office and anesthesia need cardiac clearance letter to specify that patient does not need to be seen in person at office for surgical clearance and that cardiology (MD) is aware of patient recent ER visit on 11/25/21 for chest pain after patient was seen in office by  on 11/01/21. RN who submitted clearance letter under  made General Surgery office aware to receive an answer no earlier than 01/03/22. General surgery office Jeff verbalized understanding.

## 2022-01-01 ENCOUNTER — READMISSION MANAGEMENT (OUTPATIENT)
Dept: CALL CENTER | Facility: HOSPITAL | Age: 62
End: 2022-01-01

## 2022-01-01 ENCOUNTER — TRANSCRIBE ORDERS (OUTPATIENT)
Dept: ADMINISTRATIVE | Facility: HOSPITAL | Age: 62
End: 2022-01-01

## 2022-01-01 ENCOUNTER — HOSPITAL ENCOUNTER (OUTPATIENT)
Facility: HOSPITAL | Age: 62
End: 2022-01-01

## 2022-01-01 ENCOUNTER — TRANSCRIBE ORDERS (OUTPATIENT)
Dept: LAB | Facility: HOSPITAL | Age: 62
End: 2022-01-01

## 2022-01-01 ENCOUNTER — APPOINTMENT (OUTPATIENT)
Dept: GENERAL RADIOLOGY | Facility: HOSPITAL | Age: 62
End: 2022-01-01

## 2022-01-01 ENCOUNTER — APPOINTMENT (OUTPATIENT)
Dept: NUCLEAR MEDICINE | Facility: HOSPITAL | Age: 62
End: 2022-01-01

## 2022-01-01 ENCOUNTER — OFFICE VISIT (OUTPATIENT)
Dept: ORTHOPEDIC SURGERY | Facility: CLINIC | Age: 62
End: 2022-01-01

## 2022-01-01 ENCOUNTER — DOCUMENTATION (OUTPATIENT)
Dept: CARDIAC REHAB | Facility: HOSPITAL | Age: 62
End: 2022-01-01

## 2022-01-01 ENCOUNTER — TRANSITIONAL CARE MANAGEMENT TELEPHONE ENCOUNTER (OUTPATIENT)
Dept: CALL CENTER | Facility: HOSPITAL | Age: 62
End: 2022-01-01

## 2022-01-01 ENCOUNTER — OUTSIDE FACILITY SERVICE (OUTPATIENT)
Dept: INTERNAL MEDICINE | Facility: CLINIC | Age: 62
End: 2022-01-01

## 2022-01-01 ENCOUNTER — OFFICE VISIT (OUTPATIENT)
Dept: INTERNAL MEDICINE | Facility: CLINIC | Age: 62
End: 2022-01-01

## 2022-01-01 ENCOUNTER — APPOINTMENT (OUTPATIENT)
Dept: CARDIOLOGY | Facility: HOSPITAL | Age: 62
End: 2022-01-01

## 2022-01-01 ENCOUNTER — OUTSIDE FACILITY SERVICE (OUTPATIENT)
Dept: CARDIOLOGY | Facility: CLINIC | Age: 62
End: 2022-01-01

## 2022-01-01 ENCOUNTER — TELEPHONE (OUTPATIENT)
Dept: CARDIOLOGY | Facility: CLINIC | Age: 62
End: 2022-01-01

## 2022-01-01 ENCOUNTER — HOSPITAL ENCOUNTER (EMERGENCY)
Facility: HOSPITAL | Age: 62
Discharge: LEFT BEFORE DISCHARGE | End: 2022-09-20

## 2022-01-01 ENCOUNTER — OFFICE VISIT (OUTPATIENT)
Dept: PULMONOLOGY | Facility: CLINIC | Age: 62
End: 2022-01-01

## 2022-01-01 ENCOUNTER — HOSPITAL ENCOUNTER (EMERGENCY)
Facility: HOSPITAL | Age: 62
Discharge: HOME | End: 2022-03-19
Attending: EMERGENCY MEDICINE | Admitting: EMERGENCY MEDICINE

## 2022-01-01 ENCOUNTER — OFFICE VISIT (OUTPATIENT)
Dept: CARDIOLOGY | Facility: CLINIC | Age: 62
End: 2022-01-01

## 2022-01-01 ENCOUNTER — HOSPITAL ENCOUNTER (OUTPATIENT)
Dept: GENERAL RADIOLOGY | Facility: HOSPITAL | Age: 62
Discharge: HOME | End: 2022-09-15
Admitting: PHYSICIAN ASSISTANT

## 2022-01-01 ENCOUNTER — APPOINTMENT (OUTPATIENT)
Dept: CT IMAGING | Facility: HOSPITAL | Age: 62
End: 2022-01-01

## 2022-01-01 ENCOUNTER — HOSPITAL ENCOUNTER (INPATIENT)
Facility: HOSPITAL | Age: 62
LOS: 8 days | Discharge: HOME | End: 2022-06-15
Attending: STUDENT IN AN ORGANIZED HEALTH CARE EDUCATION/TRAINING PROGRAM | Admitting: INTERNAL MEDICINE

## 2022-01-01 ENCOUNTER — TELEPHONE (OUTPATIENT)
Dept: INTERNAL MEDICINE | Facility: CLINIC | Age: 62
End: 2022-01-01

## 2022-01-01 ENCOUNTER — LAB (OUTPATIENT)
Dept: LAB | Facility: HOSPITAL | Age: 62
End: 2022-01-01

## 2022-01-01 VITALS
DIASTOLIC BLOOD PRESSURE: 80 MMHG | HEART RATE: 112 BPM | SYSTOLIC BLOOD PRESSURE: 121 MMHG | HEIGHT: 70 IN | RESPIRATION RATE: 16 BRPM | BODY MASS INDEX: 43.52 KG/M2 | WEIGHT: 304 LBS | TEMPERATURE: 98.2 F | OXYGEN SATURATION: 98 %

## 2022-01-01 VITALS
BODY MASS INDEX: 44.21 KG/M2 | WEIGHT: 308.8 LBS | DIASTOLIC BLOOD PRESSURE: 54 MMHG | RESPIRATION RATE: 12 BRPM | OXYGEN SATURATION: 100 % | HEART RATE: 75 BPM | HEIGHT: 70 IN | TEMPERATURE: 98.7 F | SYSTOLIC BLOOD PRESSURE: 143 MMHG

## 2022-01-01 VITALS
WEIGHT: 312 LBS | SYSTOLIC BLOOD PRESSURE: 118 MMHG | HEIGHT: 72 IN | DIASTOLIC BLOOD PRESSURE: 78 MMHG | RESPIRATION RATE: 18 BRPM | OXYGEN SATURATION: 96 % | HEART RATE: 85 BPM | BODY MASS INDEX: 42.26 KG/M2

## 2022-01-01 VITALS
BODY MASS INDEX: 44.81 KG/M2 | HEART RATE: 87 BPM | OXYGEN SATURATION: 95 % | DIASTOLIC BLOOD PRESSURE: 74 MMHG | WEIGHT: 313 LBS | SYSTOLIC BLOOD PRESSURE: 140 MMHG | HEIGHT: 70 IN

## 2022-01-01 VITALS
HEIGHT: 70 IN | BODY MASS INDEX: 45.1 KG/M2 | WEIGHT: 315 LBS | OXYGEN SATURATION: 91 % | HEART RATE: 86 BPM | SYSTOLIC BLOOD PRESSURE: 102 MMHG | DIASTOLIC BLOOD PRESSURE: 52 MMHG

## 2022-01-01 VITALS
DIASTOLIC BLOOD PRESSURE: 69 MMHG | OXYGEN SATURATION: 100 % | HEART RATE: 88 BPM | SYSTOLIC BLOOD PRESSURE: 128 MMHG | WEIGHT: 290 LBS | TEMPERATURE: 99.4 F | HEIGHT: 70 IN | BODY MASS INDEX: 41.52 KG/M2 | RESPIRATION RATE: 18 BRPM

## 2022-01-01 VITALS — BODY MASS INDEX: 44.95 KG/M2 | TEMPERATURE: 96.9 F | WEIGHT: 314 LBS | HEIGHT: 70 IN

## 2022-01-01 VITALS — BODY MASS INDEX: 44.95 KG/M2 | HEIGHT: 70 IN | WEIGHT: 314 LBS | TEMPERATURE: 97 F

## 2022-01-01 VITALS
RESPIRATION RATE: 20 BRPM | WEIGHT: 313 LBS | DIASTOLIC BLOOD PRESSURE: 83 MMHG | BODY MASS INDEX: 44.81 KG/M2 | SYSTOLIC BLOOD PRESSURE: 157 MMHG | OXYGEN SATURATION: 98 % | TEMPERATURE: 99.1 F | HEIGHT: 70 IN | HEART RATE: 75 BPM

## 2022-01-01 VITALS
TEMPERATURE: 98.2 F | WEIGHT: 306 LBS | RESPIRATION RATE: 16 BRPM | DIASTOLIC BLOOD PRESSURE: 74 MMHG | HEART RATE: 79 BPM | OXYGEN SATURATION: 93 % | HEIGHT: 72 IN | SYSTOLIC BLOOD PRESSURE: 125 MMHG | BODY MASS INDEX: 41.45 KG/M2

## 2022-01-01 VITALS
DIASTOLIC BLOOD PRESSURE: 69 MMHG | RESPIRATION RATE: 18 BRPM | HEART RATE: 68 BPM | BODY MASS INDEX: 45.1 KG/M2 | OXYGEN SATURATION: 95 % | WEIGHT: 315 LBS | SYSTOLIC BLOOD PRESSURE: 120 MMHG | HEIGHT: 70 IN

## 2022-01-01 VITALS — TEMPERATURE: 96.4 F | HEIGHT: 70 IN | WEIGHT: 314 LBS | BODY MASS INDEX: 44.95 KG/M2

## 2022-01-01 VITALS — BODY MASS INDEX: 44.95 KG/M2 | TEMPERATURE: 98.1 F | HEIGHT: 70 IN | WEIGHT: 314 LBS

## 2022-01-01 DIAGNOSIS — I25.10 ATHEROSCLEROSIS OF NATIVE CORONARY ARTERY OF NATIVE HEART WITHOUT ANGINA PECTORIS: ICD-10-CM

## 2022-01-01 DIAGNOSIS — I10 BENIGN ESSENTIAL HYPERTENSION: Primary | ICD-10-CM

## 2022-01-01 DIAGNOSIS — J45.50 SEVERE PERSISTENT ASTHMA WITHOUT COMPLICATION: Primary | ICD-10-CM

## 2022-01-01 DIAGNOSIS — J30.89 OTHER ALLERGIC RHINITIS: ICD-10-CM

## 2022-01-01 DIAGNOSIS — R06.09 DYSPNEA ON EXERTION: ICD-10-CM

## 2022-01-01 DIAGNOSIS — E66.01 OBESITY, MORBID, BMI 40.0-49.9: ICD-10-CM

## 2022-01-01 DIAGNOSIS — I10 ESSENTIAL HYPERTENSION: ICD-10-CM

## 2022-01-01 DIAGNOSIS — I25.10 CORONARY ARTERIOSCLEROSIS IN NATIVE ARTERY: ICD-10-CM

## 2022-01-01 DIAGNOSIS — M17.0 PRIMARY OSTEOARTHRITIS OF BOTH KNEES: ICD-10-CM

## 2022-01-01 DIAGNOSIS — R06.02 SOB (SHORTNESS OF BREATH): ICD-10-CM

## 2022-01-01 DIAGNOSIS — M25.562 ARTHRALGIA OF BOTH KNEES: Primary | ICD-10-CM

## 2022-01-01 DIAGNOSIS — M17.0 PRIMARY OSTEOARTHRITIS OF BOTH KNEES: Primary | ICD-10-CM

## 2022-01-01 DIAGNOSIS — I10 BENIGN ESSENTIAL HYPERTENSION: ICD-10-CM

## 2022-01-01 DIAGNOSIS — I25.10 CORONARY ARTERIOSCLEROSIS IN NATIVE ARTERY: Primary | ICD-10-CM

## 2022-01-01 DIAGNOSIS — J18.9 PNEUMONIA OF BOTH LUNGS DUE TO INFECTIOUS ORGANISM, UNSPECIFIED PART OF LUNG: Primary | ICD-10-CM

## 2022-01-01 DIAGNOSIS — G47.33 OSA (OBSTRUCTIVE SLEEP APNEA): ICD-10-CM

## 2022-01-01 DIAGNOSIS — E11.65 TYPE 2 DIABETES MELLITUS WITH HYPERGLYCEMIA, WITH LONG-TERM CURRENT USE OF INSULIN: ICD-10-CM

## 2022-01-01 DIAGNOSIS — I50.32 CHRONIC DIASTOLIC CHF (CONGESTIVE HEART FAILURE): Primary | ICD-10-CM

## 2022-01-01 DIAGNOSIS — G89.4 CHRONIC PAIN SYNDROME: ICD-10-CM

## 2022-01-01 DIAGNOSIS — I20.0 UNSTABLE ANGINA: ICD-10-CM

## 2022-01-01 DIAGNOSIS — Z79.4 TYPE 2 DIABETES MELLITUS WITH HYPERGLYCEMIA, WITH LONG-TERM CURRENT USE OF INSULIN: ICD-10-CM

## 2022-01-01 DIAGNOSIS — Z01.818 PREOP TESTING: ICD-10-CM

## 2022-01-01 DIAGNOSIS — E78.00 HYPERCHOLESTEROLEMIA: ICD-10-CM

## 2022-01-01 DIAGNOSIS — K21.00 GASTROESOPHAGEAL REFLUX DISEASE WITH ESOPHAGITIS WITHOUT HEMORRHAGE: ICD-10-CM

## 2022-01-01 DIAGNOSIS — R07.9 CHEST PAIN, UNSPECIFIED TYPE: Primary | ICD-10-CM

## 2022-01-01 DIAGNOSIS — E11.42 DIABETIC POLYNEUROPATHY ASSOCIATED WITH TYPE 2 DIABETES MELLITUS: ICD-10-CM

## 2022-01-01 DIAGNOSIS — M25.561 ARTHRALGIA OF BOTH KNEES: Primary | ICD-10-CM

## 2022-01-01 DIAGNOSIS — M25.551 RIGHT HIP PAIN: ICD-10-CM

## 2022-01-01 DIAGNOSIS — M25.562 ARTHRALGIA OF BOTH KNEES: ICD-10-CM

## 2022-01-01 DIAGNOSIS — E66.01 MORBID OBESITY, UNSPECIFIED OBESITY TYPE: ICD-10-CM

## 2022-01-01 DIAGNOSIS — I50.32 CHRONIC DIASTOLIC CHF (CONGESTIVE HEART FAILURE): ICD-10-CM

## 2022-01-01 DIAGNOSIS — Z01.818 PREOP TESTING: Primary | ICD-10-CM

## 2022-01-01 DIAGNOSIS — I25.10 CORONARY ARTERY DISEASE INVOLVING NATIVE CORONARY ARTERY OF NATIVE HEART WITHOUT ANGINA PECTORIS: ICD-10-CM

## 2022-01-01 DIAGNOSIS — J42 CHRONIC BRONCHITIS, UNSPECIFIED CHRONIC BRONCHITIS TYPE: ICD-10-CM

## 2022-01-01 DIAGNOSIS — R93.89 ABNORMAL CT OF THE CHEST: ICD-10-CM

## 2022-01-01 DIAGNOSIS — M62.830 MUSCLE SPASM OF BACK: ICD-10-CM

## 2022-01-01 DIAGNOSIS — M25.551 RIGHT HIP PAIN: Primary | ICD-10-CM

## 2022-01-01 DIAGNOSIS — M25.561 ARTHRALGIA OF BOTH KNEES: ICD-10-CM

## 2022-01-01 DIAGNOSIS — R94.39 ABNORMAL NUCLEAR STRESS TEST: ICD-10-CM

## 2022-01-01 DIAGNOSIS — E78.2 MIXED HYPERLIPIDEMIA: ICD-10-CM

## 2022-01-01 LAB
ACT BLD: 323 SECONDS (ref 82–152)
ALBUMIN SERPL-MCNC: 3.04 G/DL (ref 3.5–5.2)
ALBUMIN SERPL-MCNC: 3.37 G/DL (ref 3.5–5.2)
ALBUMIN SERPL-MCNC: 3.42 G/DL (ref 3.5–5.2)
ALBUMIN SERPL-MCNC: 3.84 G/DL (ref 3.5–5.2)
ALBUMIN SERPL-MCNC: 4 G/DL (ref 3.5–5.2)
ALBUMIN SERPL-MCNC: 4.2 G/DL (ref 3.5–5.2)
ALBUMIN/GLOB SERPL: 1.3 G/DL
ALBUMIN/GLOB SERPL: 1.4 G/DL
ALBUMIN/GLOB SERPL: 1.4 G/DL
ALBUMIN/GLOB SERPL: 1.8 G/DL
ALP SERPL-CCNC: 104 U/L (ref 39–117)
ALP SERPL-CCNC: 110 U/L (ref 39–117)
ALP SERPL-CCNC: 112 U/L (ref 39–117)
ALP SERPL-CCNC: 84 U/L (ref 39–117)
ALP SERPL-CCNC: 89 U/L (ref 39–117)
ALP SERPL-CCNC: 93 U/L (ref 39–117)
ALT SERPL W P-5'-P-CCNC: 29 U/L (ref 1–41)
ALT SERPL W P-5'-P-CCNC: 33 U/L (ref 1–41)
ALT SERPL W P-5'-P-CCNC: 37 U/L (ref 1–41)
ALT SERPL W P-5'-P-CCNC: 37 U/L (ref 1–41)
ALT SERPL W P-5'-P-CCNC: 40 U/L (ref 1–41)
ALT SERPL W P-5'-P-CCNC: 48 U/L (ref 1–41)
ANION GAP SERPL CALCULATED.3IONS-SCNC: 10.1 MMOL/L (ref 5–15)
ANION GAP SERPL CALCULATED.3IONS-SCNC: 10.5 MMOL/L (ref 5–15)
ANION GAP SERPL CALCULATED.3IONS-SCNC: 11.3 MMOL/L (ref 5–15)
ANION GAP SERPL CALCULATED.3IONS-SCNC: 11.7 MMOL/L (ref 5–15)
ANION GAP SERPL CALCULATED.3IONS-SCNC: 11.7 MMOL/L (ref 5–15)
ANION GAP SERPL CALCULATED.3IONS-SCNC: 6.5 MMOL/L (ref 5–15)
ANION GAP SERPL CALCULATED.3IONS-SCNC: 7.7 MMOL/L (ref 5–15)
ANION GAP SERPL CALCULATED.3IONS-SCNC: 9.2 MMOL/L (ref 5–15)
ANION GAP SERPL CALCULATED.3IONS-SCNC: 9.3 MMOL/L (ref 5–15)
AST SERPL-CCNC: 20 U/L (ref 1–40)
AST SERPL-CCNC: 26 U/L (ref 1–40)
AST SERPL-CCNC: 30 U/L (ref 1–40)
AST SERPL-CCNC: 36 U/L (ref 1–40)
AST SERPL-CCNC: 37 U/L (ref 1–40)
AST SERPL-CCNC: 45 U/L (ref 1–40)
BACTERIA SPEC AEROBE CULT: NORMAL
BACTERIA SPEC AEROBE CULT: NORMAL
BACTERIA UR QL AUTO: ABNORMAL /HPF
BASOPHILS # BLD AUTO: 0.02 10*3/MM3 (ref 0–0.2)
BASOPHILS # BLD AUTO: 0.03 10*3/MM3 (ref 0–0.2)
BASOPHILS # BLD AUTO: 0.04 10*3/MM3 (ref 0–0.2)
BASOPHILS # BLD AUTO: 0.05 10*3/MM3 (ref 0–0.2)
BASOPHILS # BLD AUTO: 0.06 10*3/MM3 (ref 0–0.2)
BASOPHILS # BLD AUTO: 0.07 10*3/MM3 (ref 0–0.2)
BASOPHILS NFR BLD AUTO: 0.2 % (ref 0–1.5)
BASOPHILS NFR BLD AUTO: 0.4 % (ref 0–1.5)
BASOPHILS NFR BLD AUTO: 0.5 % (ref 0–1.5)
BASOPHILS NFR BLD AUTO: 0.6 % (ref 0–1.5)
BASOPHILS NFR BLD AUTO: 0.7 % (ref 0–1.5)
BASOPHILS NFR BLD AUTO: 0.8 % (ref 0–1.5)
BH CV ECHO MEAS - AO ROOT DIAM: 3.1 CM
BH CV ECHO MEAS - LA DIMENSION: 5.8 CM
BH CV ECHO MEAS - LAT PEAK E' VEL: 7.3 CM/SEC
BH CV ECHO MEAS - LVOT AREA: 3.5 CM2
BH CV ECHO MEAS - LVOT DIAM: 2.1 CM
BH CV ECHO MEAS - MED PEAK E' VEL: 6.1 CM/SEC
BH CV ECHO MEAS - MV A MAX VEL: 79.5 CM/SEC
BH CV ECHO MEAS - MV E MAX VEL: 110 CM/SEC
BH CV ECHO MEAS - MV E/A: 1.38
BH CV ECHO MEAS - PA ACC TIME: 0.1 SEC
BH CV ECHO MEAS - PA PR(ACCEL): 33.1 MMHG
BH CV ECHO MEASUREMENTS AVERAGE E/E' RATIO: 16.42
BH CV NUCLEAR PRIOR STUDY: 3
BH CV REST NUCLEAR ISOTOPE DOSE: 10.8 MCI
BH CV STRESS BP STAGE 1: NORMAL
BH CV STRESS BP STAGE 2: NORMAL
BH CV STRESS COMMENTS STAGE 1: NORMAL
BH CV STRESS COMMENTS STAGE 2: NORMAL
BH CV STRESS DOSE REGADENOSON STAGE 1: 0.4
BH CV STRESS DURATION MIN STAGE 1: 0
BH CV STRESS DURATION MIN STAGE 2: 4
BH CV STRESS DURATION SEC STAGE 1: 10
BH CV STRESS DURATION SEC STAGE 2: 0
BH CV STRESS HR STAGE 1: 90
BH CV STRESS HR STAGE 2: 84
BH CV STRESS NUCLEAR ISOTOPE DOSE: 30.1 MCI
BH CV STRESS PROTOCOL 1: NORMAL
BH CV STRESS RECOVERY BP: NORMAL MMHG
BH CV STRESS RECOVERY HR: 84 BPM
BH CV STRESS STAGE 1: 1
BH CV STRESS STAGE 2: 2
BILIRUB SERPL-MCNC: 0.4 MG/DL (ref 0–1.2)
BILIRUB SERPL-MCNC: 0.4 MG/DL (ref 0–1.2)
BILIRUB SERPL-MCNC: 0.5 MG/DL (ref 0–1.2)
BILIRUB SERPL-MCNC: 0.5 MG/DL (ref 0–1.2)
BILIRUB SERPL-MCNC: 0.6 MG/DL (ref 0–1.2)
BILIRUB SERPL-MCNC: 0.6 MG/DL (ref 0–1.2)
BILIRUB UR QL STRIP: NEGATIVE
BUN SERPL-MCNC: 10 MG/DL (ref 8–23)
BUN SERPL-MCNC: 11 MG/DL (ref 8–23)
BUN SERPL-MCNC: 15 MG/DL (ref 8–23)
BUN SERPL-MCNC: 16 MG/DL (ref 8–23)
BUN SERPL-MCNC: 8 MG/DL (ref 8–23)
BUN SERPL-MCNC: 8 MG/DL (ref 8–23)
BUN SERPL-MCNC: 9 MG/DL (ref 8–23)
BUN/CREAT SERPL: 10.1 (ref 7–25)
BUN/CREAT SERPL: 11.4 (ref 7–25)
BUN/CREAT SERPL: 12.3 (ref 7–25)
BUN/CREAT SERPL: 12.4 (ref 7–25)
BUN/CREAT SERPL: 13.3 (ref 7–25)
BUN/CREAT SERPL: 13.3 (ref 7–25)
BUN/CREAT SERPL: 16.7 (ref 7–25)
BUN/CREAT SERPL: 18.5 (ref 7–25)
BUN/CREAT SERPL: 18.8 (ref 7–25)
CALCIUM SPEC-SCNC: 8.6 MG/DL (ref 8.6–10.5)
CALCIUM SPEC-SCNC: 8.9 MG/DL (ref 8.6–10.5)
CALCIUM SPEC-SCNC: 9.1 MG/DL (ref 8.6–10.5)
CALCIUM SPEC-SCNC: 9.1 MG/DL (ref 8.6–10.5)
CALCIUM SPEC-SCNC: 9.2 MG/DL (ref 8.6–10.5)
CALCIUM SPEC-SCNC: 9.3 MG/DL (ref 8.6–10.5)
CALCIUM SPEC-SCNC: 9.4 MG/DL (ref 8.6–10.5)
CALCIUM SPEC-SCNC: 9.5 MG/DL (ref 8.6–10.5)
CALCIUM SPEC-SCNC: 9.6 MG/DL (ref 8.6–10.5)
CHLORIDE SERPL-SCNC: 100 MMOL/L (ref 98–107)
CHLORIDE SERPL-SCNC: 101 MMOL/L (ref 98–107)
CHLORIDE SERPL-SCNC: 101 MMOL/L (ref 98–107)
CHLORIDE SERPL-SCNC: 103 MMOL/L (ref 98–107)
CHLORIDE SERPL-SCNC: 103 MMOL/L (ref 98–107)
CHLORIDE SERPL-SCNC: 104 MMOL/L (ref 98–107)
CHLORIDE SERPL-SCNC: 104 MMOL/L (ref 98–107)
CHLORIDE SERPL-SCNC: 97 MMOL/L (ref 98–107)
CHLORIDE SERPL-SCNC: 97 MMOL/L (ref 98–107)
CHOLEST SERPL-MCNC: 109 MG/DL (ref 0–200)
CHOLEST SERPL-MCNC: 97 MG/DL (ref 0–200)
CHOLEST SERPL-MCNC: 99 MG/DL (ref 0–200)
CLARITY UR: CLEAR
CLOSURE TME COLL+ADP BLD: 105 SECONDS (ref 56–102)
CLOSURE TME COLL+EPINEP BLD: 109 SECONDS (ref 80–184)
CO2 SERPL-SCNC: 22.3 MMOL/L (ref 22–29)
CO2 SERPL-SCNC: 23.9 MMOL/L (ref 22–29)
CO2 SERPL-SCNC: 24.8 MMOL/L (ref 22–29)
CO2 SERPL-SCNC: 25.3 MMOL/L (ref 22–29)
CO2 SERPL-SCNC: 27.3 MMOL/L (ref 22–29)
CO2 SERPL-SCNC: 27.5 MMOL/L (ref 22–29)
CO2 SERPL-SCNC: 27.7 MMOL/L (ref 22–29)
CO2 SERPL-SCNC: 27.7 MMOL/L (ref 22–29)
CO2 SERPL-SCNC: 31.5 MMOL/L (ref 22–29)
COLOR UR: YELLOW
CREAT SERPL-MCNC: 0.66 MG/DL (ref 0.76–1.27)
CREAT SERPL-MCNC: 0.7 MG/DL (ref 0.76–1.27)
CREAT SERPL-MCNC: 0.73 MG/DL (ref 0.76–1.27)
CREAT SERPL-MCNC: 0.75 MG/DL (ref 0.76–1.27)
CREAT SERPL-MCNC: 0.79 MG/DL (ref 0.76–1.27)
CREAT SERPL-MCNC: 0.81 MG/DL (ref 0.76–1.27)
CREAT SERPL-MCNC: 0.83 MG/DL (ref 0.76–1.27)
CREAT SERPL-MCNC: 0.85 MG/DL (ref 0.76–1.27)
CREAT SERPL-MCNC: 0.89 MG/DL (ref 0.76–1.27)
CRP SERPL-MCNC: 0.75 MG/DL (ref 0–0.5)
D DIMER PPP FEU-MCNC: 0.4 MCGFEU/ML (ref 0–0.5)
D-LACTATE SERPL-SCNC: 1.9 MMOL/L (ref 0.5–2)
DEPRECATED RDW RBC AUTO: 39.6 FL (ref 37–54)
DEPRECATED RDW RBC AUTO: 39.9 FL (ref 37–54)
DEPRECATED RDW RBC AUTO: 40.6 FL (ref 37–54)
DEPRECATED RDW RBC AUTO: 41.3 FL (ref 37–54)
DEPRECATED RDW RBC AUTO: 41.5 FL (ref 37–54)
DEPRECATED RDW RBC AUTO: 41.8 FL (ref 37–54)
DEPRECATED RDW RBC AUTO: 41.8 FL (ref 37–54)
DEPRECATED RDW RBC AUTO: 42.8 FL (ref 37–54)
DEPRECATED RDW RBC AUTO: 43 FL (ref 37–54)
DEPRECATED RDW RBC AUTO: 43.9 FL (ref 37–54)
EGFRCR SERPLBLD CKD-EPI 2021: 100.3 ML/MIN/1.73
EGFRCR SERPLBLD CKD-EPI 2021: 101.1 ML/MIN/1.73
EGFRCR SERPLBLD CKD-EPI 2021: 102.7 ML/MIN/1.73
EGFRCR SERPLBLD CKD-EPI 2021: 103.5 ML/MIN/1.73
EGFRCR SERPLBLD CKD-EPI 2021: 104.8 ML/MIN/1.73
EGFRCR SERPLBLD CKD-EPI 2021: 106.7 ML/MIN/1.73
EGFRCR SERPLBLD CKD-EPI 2021: 98.1 ML/MIN/1.73
EGFRCR SERPLBLD CKD-EPI 2021: 98.9 ML/MIN/1.73
EGFRCR SERPLBLD CKD-EPI 2021: 99.6 ML/MIN/1.73
EOSINOPHIL # BLD AUTO: 0 10*3/MM3 (ref 0–0.4)
EOSINOPHIL # BLD AUTO: 0.18 10*3/MM3 (ref 0–0.4)
EOSINOPHIL # BLD AUTO: 0.23 10*3/MM3 (ref 0–0.4)
EOSINOPHIL # BLD AUTO: 0.24 10*3/MM3 (ref 0–0.4)
EOSINOPHIL # BLD AUTO: 0.25 10*3/MM3 (ref 0–0.4)
EOSINOPHIL # BLD AUTO: 0.25 10*3/MM3 (ref 0–0.4)
EOSINOPHIL # BLD AUTO: 0.29 10*3/MM3 (ref 0–0.4)
EOSINOPHIL # BLD AUTO: 0.31 10*3/MM3 (ref 0–0.4)
EOSINOPHIL # BLD AUTO: 0.31 10*3/MM3 (ref 0–0.4)
EOSINOPHIL # BLD AUTO: 0.32 10*3/MM3 (ref 0–0.4)
EOSINOPHIL NFR BLD AUTO: 0 % (ref 0.3–6.2)
EOSINOPHIL NFR BLD AUTO: 1.9 % (ref 0.3–6.2)
EOSINOPHIL NFR BLD AUTO: 2.5 % (ref 0.3–6.2)
EOSINOPHIL NFR BLD AUTO: 2.6 % (ref 0.3–6.2)
EOSINOPHIL NFR BLD AUTO: 2.7 % (ref 0.3–6.2)
EOSINOPHIL NFR BLD AUTO: 3 % (ref 0.3–6.2)
EOSINOPHIL NFR BLD AUTO: 3.6 % (ref 0.3–6.2)
EOSINOPHIL NFR BLD AUTO: 3.7 % (ref 0.3–6.2)
EOSINOPHIL NFR BLD AUTO: 3.7 % (ref 0.3–6.2)
EOSINOPHIL NFR BLD AUTO: 4.1 % (ref 0.3–6.2)
ERYTHROCYTE [DISTWIDTH] IN BLOOD BY AUTOMATED COUNT: 13.9 % (ref 12.3–15.4)
ERYTHROCYTE [DISTWIDTH] IN BLOOD BY AUTOMATED COUNT: 14 % (ref 12.3–15.4)
ERYTHROCYTE [DISTWIDTH] IN BLOOD BY AUTOMATED COUNT: 14.1 % (ref 12.3–15.4)
ERYTHROCYTE [DISTWIDTH] IN BLOOD BY AUTOMATED COUNT: 14.3 % (ref 12.3–15.4)
ERYTHROCYTE [DISTWIDTH] IN BLOOD BY AUTOMATED COUNT: 14.4 % (ref 12.3–15.4)
ERYTHROCYTE [DISTWIDTH] IN BLOOD BY AUTOMATED COUNT: 14.4 % (ref 12.3–15.4)
ERYTHROCYTE [DISTWIDTH] IN BLOOD BY AUTOMATED COUNT: 14.5 % (ref 12.3–15.4)
ERYTHROCYTE [DISTWIDTH] IN BLOOD BY AUTOMATED COUNT: 14.5 % (ref 12.3–15.4)
ERYTHROCYTE [DISTWIDTH] IN BLOOD BY AUTOMATED COUNT: 14.6 % (ref 12.3–15.4)
ERYTHROCYTE [DISTWIDTH] IN BLOOD BY AUTOMATED COUNT: 14.6 % (ref 12.3–15.4)
ERYTHROCYTE [SEDIMENTATION RATE] IN BLOOD: 19 MM/HR (ref 0–15)
FLUAV RNA RESP QL NAA+PROBE: NOT DETECTED
FLUBV RNA RESP QL NAA+PROBE: NOT DETECTED
GLOBULIN UR ELPH-MCNC: 2.4 GM/DL
GLOBULIN UR ELPH-MCNC: 2.4 GM/DL
GLOBULIN UR ELPH-MCNC: 2.6 GM/DL
GLOBULIN UR ELPH-MCNC: 2.7 GM/DL
GLOBULIN UR ELPH-MCNC: 2.8 GM/DL
GLOBULIN UR ELPH-MCNC: 2.9 GM/DL
GLUCOSE BLDC GLUCOMTR-MCNC: 105 MG/DL (ref 70–130)
GLUCOSE BLDC GLUCOMTR-MCNC: 107 MG/DL (ref 70–130)
GLUCOSE BLDC GLUCOMTR-MCNC: 108 MG/DL (ref 70–130)
GLUCOSE BLDC GLUCOMTR-MCNC: 112 MG/DL (ref 70–130)
GLUCOSE BLDC GLUCOMTR-MCNC: 113 MG/DL (ref 70–130)
GLUCOSE BLDC GLUCOMTR-MCNC: 118 MG/DL (ref 70–130)
GLUCOSE BLDC GLUCOMTR-MCNC: 145 MG/DL (ref 70–130)
GLUCOSE BLDC GLUCOMTR-MCNC: 150 MG/DL (ref 70–130)
GLUCOSE BLDC GLUCOMTR-MCNC: 152 MG/DL (ref 70–130)
GLUCOSE BLDC GLUCOMTR-MCNC: 153 MG/DL (ref 70–130)
GLUCOSE BLDC GLUCOMTR-MCNC: 160 MG/DL (ref 70–130)
GLUCOSE BLDC GLUCOMTR-MCNC: 162 MG/DL (ref 70–130)
GLUCOSE BLDC GLUCOMTR-MCNC: 170 MG/DL (ref 70–130)
GLUCOSE BLDC GLUCOMTR-MCNC: 178 MG/DL (ref 70–130)
GLUCOSE BLDC GLUCOMTR-MCNC: 179 MG/DL (ref 70–130)
GLUCOSE BLDC GLUCOMTR-MCNC: 196 MG/DL (ref 70–130)
GLUCOSE BLDC GLUCOMTR-MCNC: 203 MG/DL (ref 70–130)
GLUCOSE BLDC GLUCOMTR-MCNC: 207 MG/DL (ref 70–130)
GLUCOSE BLDC GLUCOMTR-MCNC: 233 MG/DL (ref 70–130)
GLUCOSE BLDC GLUCOMTR-MCNC: 251 MG/DL (ref 70–130)
GLUCOSE BLDC GLUCOMTR-MCNC: 264 MG/DL (ref 70–130)
GLUCOSE BLDC GLUCOMTR-MCNC: 268 MG/DL (ref 70–130)
GLUCOSE BLDC GLUCOMTR-MCNC: 322 MG/DL (ref 70–130)
GLUCOSE BLDC GLUCOMTR-MCNC: 65 MG/DL (ref 70–130)
GLUCOSE BLDC GLUCOMTR-MCNC: 68 MG/DL (ref 70–130)
GLUCOSE BLDC GLUCOMTR-MCNC: 75 MG/DL (ref 70–130)
GLUCOSE BLDC GLUCOMTR-MCNC: 76 MG/DL (ref 70–130)
GLUCOSE BLDC GLUCOMTR-MCNC: 77 MG/DL (ref 70–130)
GLUCOSE BLDC GLUCOMTR-MCNC: 84 MG/DL (ref 70–130)
GLUCOSE BLDC GLUCOMTR-MCNC: 88 MG/DL (ref 70–130)
GLUCOSE BLDC GLUCOMTR-MCNC: 89 MG/DL (ref 70–130)
GLUCOSE BLDC GLUCOMTR-MCNC: 89 MG/DL (ref 70–130)
GLUCOSE BLDC GLUCOMTR-MCNC: 91 MG/DL (ref 70–130)
GLUCOSE SERPL-MCNC: 103 MG/DL (ref 65–99)
GLUCOSE SERPL-MCNC: 118 MG/DL (ref 65–99)
GLUCOSE SERPL-MCNC: 120 MG/DL (ref 65–99)
GLUCOSE SERPL-MCNC: 123 MG/DL (ref 65–99)
GLUCOSE SERPL-MCNC: 129 MG/DL (ref 65–99)
GLUCOSE SERPL-MCNC: 141 MG/DL (ref 65–99)
GLUCOSE SERPL-MCNC: 222 MG/DL (ref 65–99)
GLUCOSE SERPL-MCNC: 242 MG/DL (ref 65–99)
GLUCOSE SERPL-MCNC: 297 MG/DL (ref 65–99)
GLUCOSE UR STRIP-MCNC: NEGATIVE MG/DL
HBA1C MFR BLD: 6.8 % (ref 4.8–5.6)
HCT VFR BLD AUTO: 34.4 % (ref 37.5–51)
HCT VFR BLD AUTO: 35.6 % (ref 37.5–51)
HCT VFR BLD AUTO: 36.5 % (ref 37.5–51)
HCT VFR BLD AUTO: 37.4 % (ref 37.5–51)
HCT VFR BLD AUTO: 37.4 % (ref 37.5–51)
HCT VFR BLD AUTO: 37.5 % (ref 37.5–51)
HCT VFR BLD AUTO: 38.2 % (ref 37.5–51)
HCT VFR BLD AUTO: 40.5 % (ref 37.5–51)
HCT VFR BLD AUTO: 40.9 % (ref 37.5–51)
HCT VFR BLD AUTO: 42.4 % (ref 37.5–51)
HDLC SERPL-MCNC: 29 MG/DL (ref 40–60)
HDLC SERPL-MCNC: 30 MG/DL (ref 40–60)
HDLC SERPL-MCNC: 31 MG/DL (ref 40–60)
HGB BLD-MCNC: 10.9 G/DL (ref 13–17.7)
HGB BLD-MCNC: 11.4 G/DL (ref 13–17.7)
HGB BLD-MCNC: 11.5 G/DL (ref 13–17.7)
HGB BLD-MCNC: 11.6 G/DL (ref 13–17.7)
HGB BLD-MCNC: 11.7 G/DL (ref 13–17.7)
HGB BLD-MCNC: 12.3 G/DL (ref 13–17.7)
HGB BLD-MCNC: 12.4 G/DL (ref 13–17.7)
HGB BLD-MCNC: 13.1 G/DL (ref 13–17.7)
HGB BLD-MCNC: 13.1 G/DL (ref 13–17.7)
HGB BLD-MCNC: 14 G/DL (ref 13–17.7)
HGB UR QL STRIP.AUTO: NEGATIVE
HOLD SPECIMEN: NORMAL
HYALINE CASTS UR QL AUTO: ABNORMAL /LPF
HYPOCHROMIA BLD QL: NORMAL
IMM GRANULOCYTES # BLD AUTO: 0.04 10*3/MM3 (ref 0–0.05)
IMM GRANULOCYTES # BLD AUTO: 0.04 10*3/MM3 (ref 0–0.05)
IMM GRANULOCYTES # BLD AUTO: 0.05 10*3/MM3 (ref 0–0.05)
IMM GRANULOCYTES # BLD AUTO: 0.06 10*3/MM3 (ref 0–0.05)
IMM GRANULOCYTES # BLD AUTO: 0.06 10*3/MM3 (ref 0–0.05)
IMM GRANULOCYTES # BLD AUTO: 0.08 10*3/MM3 (ref 0–0.05)
IMM GRANULOCYTES # BLD AUTO: 0.1 10*3/MM3 (ref 0–0.05)
IMM GRANULOCYTES # BLD AUTO: 0.1 10*3/MM3 (ref 0–0.05)
IMM GRANULOCYTES # BLD AUTO: 0.13 10*3/MM3 (ref 0–0.05)
IMM GRANULOCYTES # BLD AUTO: 0.14 10*3/MM3 (ref 0–0.05)
IMM GRANULOCYTES NFR BLD AUTO: 0.4 % (ref 0–0.5)
IMM GRANULOCYTES NFR BLD AUTO: 0.6 % (ref 0–0.5)
IMM GRANULOCYTES NFR BLD AUTO: 0.7 % (ref 0–0.5)
IMM GRANULOCYTES NFR BLD AUTO: 0.9 % (ref 0–0.5)
IMM GRANULOCYTES NFR BLD AUTO: 1 % (ref 0–0.5)
IMM GRANULOCYTES NFR BLD AUTO: 1.1 % (ref 0–0.5)
IMM GRANULOCYTES NFR BLD AUTO: 1.3 % (ref 0–0.5)
IMM GRANULOCYTES NFR BLD AUTO: 1.5 % (ref 0–0.5)
KETONES UR QL STRIP: ABNORMAL
LDLC SERPL CALC-MCNC: 49 MG/DL (ref 0–100)
LDLC SERPL CALC-MCNC: 50 MG/DL (ref 0–100)
LDLC SERPL CALC-MCNC: 58 MG/DL (ref 0–100)
LDLC/HDLC SERPL: 1.59 {RATIO}
LDLC/HDLC SERPL: 1.66 {RATIO}
LDLC/HDLC SERPL: 1.89 {RATIO}
LEUKOCYTE ESTERASE UR QL STRIP.AUTO: ABNORMAL
LIPASE SERPL-CCNC: 21 U/L (ref 13–60)
LV EF NUC BP: 50 %
LYMPHOCYTES # BLD AUTO: 1.08 10*3/MM3 (ref 0.7–3.1)
LYMPHOCYTES # BLD AUTO: 1.22 10*3/MM3 (ref 0.7–3.1)
LYMPHOCYTES # BLD AUTO: 1.45 10*3/MM3 (ref 0.7–3.1)
LYMPHOCYTES # BLD AUTO: 1.5 10*3/MM3 (ref 0.7–3.1)
LYMPHOCYTES # BLD AUTO: 1.51 10*3/MM3 (ref 0.7–3.1)
LYMPHOCYTES # BLD AUTO: 1.67 10*3/MM3 (ref 0.7–3.1)
LYMPHOCYTES # BLD AUTO: 1.68 10*3/MM3 (ref 0.7–3.1)
LYMPHOCYTES # BLD AUTO: 1.69 10*3/MM3 (ref 0.7–3.1)
LYMPHOCYTES # BLD AUTO: 1.86 10*3/MM3 (ref 0.7–3.1)
LYMPHOCYTES # BLD AUTO: 2.1 10*3/MM3 (ref 0.7–3.1)
LYMPHOCYTES NFR BLD AUTO: 10.7 % (ref 19.6–45.3)
LYMPHOCYTES NFR BLD AUTO: 14 % (ref 19.6–45.3)
LYMPHOCYTES NFR BLD AUTO: 14.1 % (ref 19.6–45.3)
LYMPHOCYTES NFR BLD AUTO: 17.4 % (ref 19.6–45.3)
LYMPHOCYTES NFR BLD AUTO: 18.4 % (ref 19.6–45.3)
LYMPHOCYTES NFR BLD AUTO: 18.8 % (ref 19.6–45.3)
LYMPHOCYTES NFR BLD AUTO: 19.8 % (ref 19.6–45.3)
LYMPHOCYTES NFR BLD AUTO: 21.8 % (ref 19.6–45.3)
LYMPHOCYTES NFR BLD AUTO: 22.2 % (ref 19.6–45.3)
LYMPHOCYTES NFR BLD AUTO: 23.8 % (ref 19.6–45.3)
MAGNESIUM SERPL-MCNC: 1.8 MG/DL (ref 1.6–2.4)
MAGNESIUM SERPL-MCNC: 1.9 MG/DL (ref 1.6–2.4)
MAGNESIUM SERPL-MCNC: 2.1 MG/DL (ref 1.6–2.4)
MAXIMAL PREDICTED HEART RATE: 159 BPM
MAXIMAL PREDICTED HEART RATE: 159 BPM
MCH RBC QN AUTO: 25.6 PG (ref 26.6–33)
MCH RBC QN AUTO: 25.7 PG (ref 26.6–33)
MCH RBC QN AUTO: 25.8 PG (ref 26.6–33)
MCH RBC QN AUTO: 25.8 PG (ref 26.6–33)
MCH RBC QN AUTO: 25.9 PG (ref 26.6–33)
MCH RBC QN AUTO: 25.9 PG (ref 26.6–33)
MCH RBC QN AUTO: 26 PG (ref 26.6–33)
MCHC RBC AUTO-ENTMCNC: 30.7 G/DL (ref 31.5–35.7)
MCHC RBC AUTO-ENTMCNC: 30.9 G/DL (ref 31.5–35.7)
MCHC RBC AUTO-ENTMCNC: 31.7 G/DL (ref 31.5–35.7)
MCHC RBC AUTO-ENTMCNC: 32 G/DL (ref 31.5–35.7)
MCHC RBC AUTO-ENTMCNC: 32 G/DL (ref 31.5–35.7)
MCHC RBC AUTO-ENTMCNC: 32.1 G/DL (ref 31.5–35.7)
MCHC RBC AUTO-ENTMCNC: 32.2 G/DL (ref 31.5–35.7)
MCHC RBC AUTO-ENTMCNC: 32.3 G/DL (ref 31.5–35.7)
MCHC RBC AUTO-ENTMCNC: 33 G/DL (ref 31.5–35.7)
MCHC RBC AUTO-ENTMCNC: 33.2 G/DL (ref 31.5–35.7)
MCV RBC AUTO: 78.2 FL (ref 79–97)
MCV RBC AUTO: 78.8 FL (ref 79–97)
MCV RBC AUTO: 79.6 FL (ref 79–97)
MCV RBC AUTO: 80.1 FL (ref 79–97)
MCV RBC AUTO: 80.2 FL (ref 79–97)
MCV RBC AUTO: 80.2 FL (ref 79–97)
MCV RBC AUTO: 81 FL (ref 79–97)
MCV RBC AUTO: 81.5 FL (ref 79–97)
MCV RBC AUTO: 82.8 FL (ref 79–97)
MCV RBC AUTO: 83.7 FL (ref 79–97)
MONOCYTES # BLD AUTO: 0.48 10*3/MM3 (ref 0.1–0.9)
MONOCYTES # BLD AUTO: 0.5 10*3/MM3 (ref 0.1–0.9)
MONOCYTES # BLD AUTO: 0.53 10*3/MM3 (ref 0.1–0.9)
MONOCYTES # BLD AUTO: 0.53 10*3/MM3 (ref 0.1–0.9)
MONOCYTES # BLD AUTO: 0.55 10*3/MM3 (ref 0.1–0.9)
MONOCYTES # BLD AUTO: 0.56 10*3/MM3 (ref 0.1–0.9)
MONOCYTES # BLD AUTO: 0.57 10*3/MM3 (ref 0.1–0.9)
MONOCYTES # BLD AUTO: 0.58 10*3/MM3 (ref 0.1–0.9)
MONOCYTES # BLD AUTO: 0.59 10*3/MM3 (ref 0.1–0.9)
MONOCYTES # BLD AUTO: 0.67 10*3/MM3 (ref 0.1–0.9)
MONOCYTES NFR BLD AUTO: 5.3 % (ref 5–12)
MONOCYTES NFR BLD AUTO: 5.5 % (ref 5–12)
MONOCYTES NFR BLD AUTO: 5.8 % (ref 5–12)
MONOCYTES NFR BLD AUTO: 6.1 % (ref 5–12)
MONOCYTES NFR BLD AUTO: 6.3 % (ref 5–12)
MONOCYTES NFR BLD AUTO: 6.5 % (ref 5–12)
MONOCYTES NFR BLD AUTO: 6.5 % (ref 5–12)
MONOCYTES NFR BLD AUTO: 7 % (ref 5–12)
MONOCYTES NFR BLD AUTO: 7.1 % (ref 5–12)
MONOCYTES NFR BLD AUTO: 7.1 % (ref 5–12)
NEUTROPHILS NFR BLD AUTO: 4.44 10*3/MM3 (ref 1.7–7)
NEUTROPHILS NFR BLD AUTO: 4.49 10*3/MM3 (ref 1.7–7)
NEUTROPHILS NFR BLD AUTO: 5.85 10*3/MM3 (ref 1.7–7)
NEUTROPHILS NFR BLD AUTO: 6.19 10*3/MM3 (ref 1.7–7)
NEUTROPHILS NFR BLD AUTO: 6.3 10*3/MM3 (ref 1.7–7)
NEUTROPHILS NFR BLD AUTO: 6.48 10*3/MM3 (ref 1.7–7)
NEUTROPHILS NFR BLD AUTO: 6.59 10*3/MM3 (ref 1.7–7)
NEUTROPHILS NFR BLD AUTO: 63.7 % (ref 42.7–76)
NEUTROPHILS NFR BLD AUTO: 65.9 % (ref 42.7–76)
NEUTROPHILS NFR BLD AUTO: 67.3 % (ref 42.7–76)
NEUTROPHILS NFR BLD AUTO: 68.5 % (ref 42.7–76)
NEUTROPHILS NFR BLD AUTO: 7.24 10*3/MM3 (ref 1.7–7)
NEUTROPHILS NFR BLD AUTO: 7.86 10*3/MM3 (ref 1.7–7)
NEUTROPHILS NFR BLD AUTO: 70.7 % (ref 42.7–76)
NEUTROPHILS NFR BLD AUTO: 71.1 % (ref 42.7–76)
NEUTROPHILS NFR BLD AUTO: 71.5 % (ref 42.7–76)
NEUTROPHILS NFR BLD AUTO: 75.8 % (ref 42.7–76)
NEUTROPHILS NFR BLD AUTO: 76.4 % (ref 42.7–76)
NEUTROPHILS NFR BLD AUTO: 8.35 10*3/MM3 (ref 1.7–7)
NEUTROPHILS NFR BLD AUTO: 82.8 % (ref 42.7–76)
NITRITE UR QL STRIP: NEGATIVE
NRBC BLD AUTO-RTO: 0 /100 WBC (ref 0–0.2)
NT-PROBNP SERPL-MCNC: 35.1 PG/ML (ref 0–900)
PERCENT MAX PREDICTED HR: 56.6 %
PH UR STRIP.AUTO: 5.5 [PH] (ref 5–8)
PLAT MORPH BLD: NORMAL
PLATELET # BLD AUTO: 142 10*3/MM3 (ref 140–450)
PLATELET # BLD AUTO: 156 10*3/MM3 (ref 140–450)
PLATELET # BLD AUTO: 175 10*3/MM3 (ref 140–450)
PLATELET # BLD AUTO: 176 10*3/MM3 (ref 140–450)
PLATELET # BLD AUTO: 179 10*3/MM3 (ref 140–450)
PLATELET # BLD AUTO: 181 10*3/MM3 (ref 140–450)
PLATELET # BLD AUTO: 183 10*3/MM3 (ref 140–450)
PLATELET # BLD AUTO: 219 10*3/MM3 (ref 140–450)
PLATELET # BLD AUTO: 219 10*3/MM3 (ref 140–450)
PLATELET # BLD AUTO: 222 10*3/MM3 (ref 140–450)
PLATELET FUNCTION: ABNORMAL
PMV BLD AUTO: 10.1 FL (ref 6–12)
PMV BLD AUTO: 10.2 FL (ref 6–12)
PMV BLD AUTO: 10.5 FL (ref 6–12)
PMV BLD AUTO: 10.9 FL (ref 6–12)
PMV BLD AUTO: 9.7 FL (ref 6–12)
PMV BLD AUTO: 9.8 FL (ref 6–12)
PMV BLD AUTO: 9.9 FL (ref 6–12)
POTASSIUM SERPL-SCNC: 3.6 MMOL/L (ref 3.5–5.2)
POTASSIUM SERPL-SCNC: 3.8 MMOL/L (ref 3.5–5.2)
POTASSIUM SERPL-SCNC: 4.1 MMOL/L (ref 3.5–5.2)
POTASSIUM SERPL-SCNC: 4.2 MMOL/L (ref 3.5–5.2)
POTASSIUM SERPL-SCNC: 4.3 MMOL/L (ref 3.5–5.2)
POTASSIUM SERPL-SCNC: 4.5 MMOL/L (ref 3.5–5.2)
POTASSIUM SERPL-SCNC: 4.8 MMOL/L (ref 3.5–5.2)
PROCALCITONIN SERPL-MCNC: 0.09 NG/ML (ref 0–0.25)
PROT SERPL-MCNC: 5.4 G/DL (ref 6–8.5)
PROT SERPL-MCNC: 6 G/DL (ref 6–8.5)
PROT SERPL-MCNC: 6.1 G/DL (ref 6–8.5)
PROT SERPL-MCNC: 6.6 G/DL (ref 6–8.5)
PROT SERPL-MCNC: 6.6 G/DL (ref 6–8.5)
PROT SERPL-MCNC: 6.9 G/DL (ref 6–8.5)
PROT UR QL STRIP: NEGATIVE
QT INTERVAL: 408 MS
QT INTERVAL: 414 MS
QT INTERVAL: 430 MS
QT INTERVAL: 436 MS
QT INTERVAL: 454 MS
QTC INTERVAL: 480 MS
QTC INTERVAL: 486 MS
QTC INTERVAL: 495 MS
QTC INTERVAL: 504 MS
QTC INTERVAL: 506 MS
RBC # BLD AUTO: 4.22 10*6/MM3 (ref 4.14–5.8)
RBC # BLD AUTO: 4.44 10*6/MM3 (ref 4.14–5.8)
RBC # BLD AUTO: 4.47 10*6/MM3 (ref 4.14–5.8)
RBC # BLD AUTO: 4.53 10*6/MM3 (ref 4.14–5.8)
RBC # BLD AUTO: 4.55 10*6/MM3 (ref 4.14–5.8)
RBC # BLD AUTO: 4.77 10*6/MM3 (ref 4.14–5.8)
RBC # BLD AUTO: 4.78 10*6/MM3 (ref 4.14–5.8)
RBC # BLD AUTO: 5.05 10*6/MM3 (ref 4.14–5.8)
RBC # BLD AUTO: 5.09 10*6/MM3 (ref 4.14–5.8)
RBC # BLD AUTO: 5.38 10*6/MM3 (ref 4.14–5.8)
RBC # UR STRIP: ABNORMAL /HPF
REF LAB TEST METHOD: ABNORMAL
SARS-COV-2 RNA PNL SPEC NAA+PROBE: NOT DETECTED
SARS-COV-2 RNA PNL SPEC NAA+PROBE: NOT DETECTED
SARS-COV-2 RNA RESP QL NAA+PROBE: NOT DETECTED
SARS-COV-2 RNA RESP QL NAA+PROBE: NOT DETECTED
SODIUM SERPL-SCNC: 135 MMOL/L (ref 136–145)
SODIUM SERPL-SCNC: 137 MMOL/L (ref 136–145)
SODIUM SERPL-SCNC: 138 MMOL/L (ref 136–145)
SODIUM SERPL-SCNC: 139 MMOL/L (ref 136–145)
SODIUM SERPL-SCNC: 141 MMOL/L (ref 136–145)
SP GR UR STRIP: 1.02 (ref 1–1.03)
SQUAMOUS #/AREA URNS HPF: ABNORMAL /HPF
STRESS BASELINE BP: NORMAL MMHG
STRESS BASELINE HR: 80 BPM
STRESS PERCENT HR: 67 %
STRESS POST PEAK BP: NORMAL MMHG
STRESS POST PEAK HR: 90 BPM
STRESS TARGET HR: 135 BPM
STRESS TARGET HR: 135 BPM
TRIGL SERPL-MCNC: 100 MG/DL (ref 0–150)
TRIGL SERPL-MCNC: 111 MG/DL (ref 0–150)
TRIGL SERPL-MCNC: 94 MG/DL (ref 0–150)
TROPONIN T SERPL-MCNC: 0.01 NG/ML (ref 0–0.03)
TROPONIN T SERPL-MCNC: <0.01 NG/ML (ref 0–0.03)
TSH SERPL DL<=0.05 MIU/L-ACNC: 0.66 UIU/ML (ref 0.27–4.2)
UROBILINOGEN UR QL STRIP: ABNORMAL
VLDLC SERPL-MCNC: 18 MG/DL (ref 5–40)
VLDLC SERPL-MCNC: 19 MG/DL (ref 5–40)
VLDLC SERPL-MCNC: 21 MG/DL (ref 5–40)
WBC # UR STRIP: ABNORMAL /HPF
WBC NRBC COR # BLD: 10.08 10*3/MM3 (ref 3.4–10.8)
WBC NRBC COR # BLD: 10.12 10*3/MM3 (ref 3.4–10.8)
WBC NRBC COR # BLD: 10.29 10*3/MM3 (ref 3.4–10.8)
WBC NRBC COR # BLD: 6.75 10*3/MM3 (ref 3.4–10.8)
WBC NRBC COR # BLD: 7.05 10*3/MM3 (ref 3.4–10.8)
WBC NRBC COR # BLD: 8.55 10*3/MM3 (ref 3.4–10.8)
WBC NRBC COR # BLD: 8.7 10*3/MM3 (ref 3.4–10.8)
WBC NRBC COR # BLD: 8.7 10*3/MM3 (ref 3.4–10.8)
WBC NRBC COR # BLD: 8.9 10*3/MM3 (ref 3.4–10.8)
WBC NRBC COR # BLD: 9.62 10*3/MM3 (ref 3.4–10.8)
WHOLE BLOOD HOLD COAG: NORMAL
WHOLE BLOOD HOLD SPECIMEN: NORMAL

## 2022-01-01 PROCEDURE — 94799 UNLISTED PULMONARY SVC/PX: CPT

## 2022-01-01 PROCEDURE — 027034Z DILATION OF CORONARY ARTERY, ONE ARTERY WITH DRUG-ELUTING INTRALUMINAL DEVICE, PERCUTANEOUS APPROACH: ICD-10-PCS | Performed by: INTERNAL MEDICINE

## 2022-01-01 PROCEDURE — 83605 ASSAY OF LACTIC ACID: CPT | Performed by: NURSE PRACTITIONER

## 2022-01-01 PROCEDURE — 85025 COMPLETE CBC W/AUTO DIFF WBC: CPT | Performed by: EMERGENCY MEDICINE

## 2022-01-01 PROCEDURE — G0180 MD CERTIFICATION HHA PATIENT: HCPCS | Performed by: INTERNAL MEDICINE

## 2022-01-01 PROCEDURE — U0004 COV-19 TEST NON-CDC HGH THRU: HCPCS

## 2022-01-01 PROCEDURE — 87636 SARSCOV2 & INF A&B AMP PRB: CPT | Performed by: STUDENT IN AN ORGANIZED HEALTH CARE EDUCATION/TRAINING PROGRAM

## 2022-01-01 PROCEDURE — 99214 OFFICE O/P EST MOD 30 MIN: CPT | Performed by: NURSE PRACTITIONER

## 2022-01-01 PROCEDURE — 99231 SBSQ HOSP IP/OBS SF/LOW 25: CPT | Performed by: INTERNAL MEDICINE

## 2022-01-01 PROCEDURE — 36415 COLL VENOUS BLD VENIPUNCTURE: CPT | Performed by: INTERNAL MEDICINE

## 2022-01-01 PROCEDURE — 82962 GLUCOSE BLOOD TEST: CPT

## 2022-01-01 PROCEDURE — 99214 OFFICE O/P EST MOD 30 MIN: CPT | Performed by: INTERNAL MEDICINE

## 2022-01-01 PROCEDURE — 99211 OFF/OP EST MAY X REQ PHY/QHP: CPT

## 2022-01-01 PROCEDURE — 93571 IV DOP VEL&/PRESS C FLO 1ST: CPT | Performed by: INTERNAL MEDICINE

## 2022-01-01 PROCEDURE — 71045 X-RAY EXAM CHEST 1 VIEW: CPT

## 2022-01-01 PROCEDURE — 84484 ASSAY OF TROPONIN QUANT: CPT | Performed by: INTERNAL MEDICINE

## 2022-01-01 PROCEDURE — 85025 COMPLETE CBC W/AUTO DIFF WBC: CPT | Performed by: INTERNAL MEDICINE

## 2022-01-01 PROCEDURE — 85007 BL SMEAR W/DIFF WBC COUNT: CPT | Performed by: INTERNAL MEDICINE

## 2022-01-01 PROCEDURE — 93005 ELECTROCARDIOGRAM TRACING: CPT | Performed by: STUDENT IN AN ORGANIZED HEALTH CARE EDUCATION/TRAINING PROGRAM

## 2022-01-01 PROCEDURE — 92928 PRQ TCAT PLMT NTRAC ST 1 LES: CPT | Performed by: INTERNAL MEDICINE

## 2022-01-01 PROCEDURE — 84145 PROCALCITONIN (PCT): CPT | Performed by: NURSE PRACTITIONER

## 2022-01-01 PROCEDURE — 94660 CPAP INITIATION&MGMT: CPT

## 2022-01-01 PROCEDURE — 85576 BLOOD PLATELET AGGREGATION: CPT | Performed by: PHYSICIAN ASSISTANT

## 2022-01-01 PROCEDURE — 83036 HEMOGLOBIN GLYCOSYLATED A1C: CPT | Performed by: INTERNAL MEDICINE

## 2022-01-01 PROCEDURE — 25010000002 ENOXAPARIN PER 10 MG: Performed by: INTERNAL MEDICINE

## 2022-01-01 PROCEDURE — 96367 TX/PROPH/DG ADDL SEQ IV INF: CPT

## 2022-01-01 PROCEDURE — 80053 COMPREHEN METABOLIC PANEL: CPT | Performed by: INTERNAL MEDICINE

## 2022-01-01 PROCEDURE — 94761 N-INVAS EAR/PLS OXIMETRY MLT: CPT

## 2022-01-01 PROCEDURE — C1874 STENT, COATED/COV W/DEL SYS: HCPCS | Performed by: INTERNAL MEDICINE

## 2022-01-01 PROCEDURE — 93306 TTE W/DOPPLER COMPLETE: CPT | Performed by: SPECIALIST

## 2022-01-01 PROCEDURE — 99285 EMERGENCY DEPT VISIT HI MDM: CPT

## 2022-01-01 PROCEDURE — 99152 MOD SED SAME PHYS/QHP 5/>YRS: CPT | Performed by: INTERNAL MEDICINE

## 2022-01-01 PROCEDURE — 25010000002 HYDROMORPHONE PER 4 MG: Performed by: INTERNAL MEDICINE

## 2022-01-01 PROCEDURE — 84484 ASSAY OF TROPONIN QUANT: CPT | Performed by: STUDENT IN AN ORGANIZED HEALTH CARE EDUCATION/TRAINING PROGRAM

## 2022-01-01 PROCEDURE — 4A023N7 MEASUREMENT OF CARDIAC SAMPLING AND PRESSURE, LEFT HEART, PERCUTANEOUS APPROACH: ICD-10-PCS | Performed by: INTERNAL MEDICINE

## 2022-01-01 PROCEDURE — 93459 L HRT ART/GRFT ANGIO: CPT | Performed by: INTERNAL MEDICINE

## 2022-01-01 PROCEDURE — 25010000002 MIDAZOLAM PER 1 MG: Performed by: INTERNAL MEDICINE

## 2022-01-01 PROCEDURE — C1894 INTRO/SHEATH, NON-LASER: HCPCS | Performed by: INTERNAL MEDICINE

## 2022-01-01 PROCEDURE — 99239 HOSP IP/OBS DSCHRG MGMT >30: CPT | Performed by: INTERNAL MEDICINE

## 2022-01-01 PROCEDURE — 85379 FIBRIN DEGRADATION QUANT: CPT | Performed by: INTERNAL MEDICINE

## 2022-01-01 PROCEDURE — 25010000002 FENTANYL CITRATE (PF) 50 MCG/ML SOLUTION: Performed by: INTERNAL MEDICINE

## 2022-01-01 PROCEDURE — 93308 TTE F-UP OR LMTD: CPT | Performed by: INTERNAL MEDICINE

## 2022-01-01 PROCEDURE — C9600 PERC DRUG-EL COR STENT SING: HCPCS | Performed by: INTERNAL MEDICINE

## 2022-01-01 PROCEDURE — 25010000002 DIPHENHYDRAMINE PER 50 MG: Performed by: STUDENT IN AN ORGANIZED HEALTH CARE EDUCATION/TRAINING PROGRAM

## 2022-01-01 PROCEDURE — 63710000001 INSULIN REGULAR HUMAN (CONC) 500 UNIT/ML SOLUTION PEN-INJECTOR 3 ML PEN: Performed by: INTERNAL MEDICINE

## 2022-01-01 PROCEDURE — 99232 SBSQ HOSP IP/OBS MODERATE 35: CPT | Performed by: INTERNAL MEDICINE

## 2022-01-01 PROCEDURE — 93005 ELECTROCARDIOGRAM TRACING: CPT | Performed by: INTERNAL MEDICINE

## 2022-01-01 PROCEDURE — 80061 LIPID PANEL: CPT | Performed by: INTERNAL MEDICINE

## 2022-01-01 PROCEDURE — 85025 COMPLETE CBC W/AUTO DIFF WBC: CPT | Performed by: STUDENT IN AN ORGANIZED HEALTH CARE EDUCATION/TRAINING PROGRAM

## 2022-01-01 PROCEDURE — C1769 GUIDE WIRE: HCPCS | Performed by: INTERNAL MEDICINE

## 2022-01-01 PROCEDURE — 93005 ELECTROCARDIOGRAM TRACING: CPT | Performed by: NURSE PRACTITIONER

## 2022-01-01 PROCEDURE — 0 TECHNETIUM SESTAMIBI: Performed by: INTERNAL MEDICINE

## 2022-01-01 PROCEDURE — 83735 ASSAY OF MAGNESIUM: CPT | Performed by: PHYSICIAN ASSISTANT

## 2022-01-01 PROCEDURE — 84443 ASSAY THYROID STIM HORMONE: CPT | Performed by: INTERNAL MEDICINE

## 2022-01-01 PROCEDURE — 20610 DRAIN/INJ JOINT/BURSA W/O US: CPT | Performed by: PHYSICIAN ASSISTANT

## 2022-01-01 PROCEDURE — 86140 C-REACTIVE PROTEIN: CPT | Performed by: NURSE PRACTITIONER

## 2022-01-01 PROCEDURE — 25010000002 REGADENOSON 0.4 MG/5ML SOLUTION: Performed by: INTERNAL MEDICINE

## 2022-01-01 PROCEDURE — 83880 ASSAY OF NATRIURETIC PEPTIDE: CPT | Performed by: EMERGENCY MEDICINE

## 2022-01-01 PROCEDURE — 94618 PULMONARY STRESS TESTING: CPT | Performed by: NURSE PRACTITIONER

## 2022-01-01 PROCEDURE — 87635 SARS-COV-2 COVID-19 AMP PRB: CPT | Performed by: NURSE PRACTITIONER

## 2022-01-01 PROCEDURE — 94640 AIRWAY INHALATION TREATMENT: CPT

## 2022-01-01 PROCEDURE — 36415 COLL VENOUS BLD VENIPUNCTURE: CPT

## 2022-01-01 PROCEDURE — 0 IOPAMIDOL PER 1 ML: Performed by: INTERNAL MEDICINE

## 2022-01-01 PROCEDURE — 99223 1ST HOSP IP/OBS HIGH 75: CPT | Performed by: INTERNAL MEDICINE

## 2022-01-01 PROCEDURE — 63710000001 INSULIN REGULAR HUMAN (CONC) 500 UNIT/ML SOLUTION PEN-INJECTOR: Performed by: INTERNAL MEDICINE

## 2022-01-01 PROCEDURE — 96375 TX/PRO/DX INJ NEW DRUG ADDON: CPT

## 2022-01-01 PROCEDURE — 25010000002 PROCHLORPERAZINE 10 MG/2ML SOLUTION: Performed by: INTERNAL MEDICINE

## 2022-01-01 PROCEDURE — 99222 1ST HOSP IP/OBS MODERATE 55: CPT | Performed by: SPECIALIST

## 2022-01-01 PROCEDURE — C9803 HOPD COVID-19 SPEC COLLECT: HCPCS

## 2022-01-01 PROCEDURE — 25010000002 DEXAMETHASONE SODIUM PHOSPHATE 10 MG/ML SOLUTION: Performed by: NURSE PRACTITIONER

## 2022-01-01 PROCEDURE — 99284 EMERGENCY DEPT VISIT MOD MDM: CPT

## 2022-01-01 PROCEDURE — 93017 CV STRESS TEST TRACING ONLY: CPT

## 2022-01-01 PROCEDURE — 93306 TTE W/DOPPLER COMPLETE: CPT

## 2022-01-01 PROCEDURE — 96365 THER/PROPH/DIAG IV INF INIT: CPT

## 2022-01-01 PROCEDURE — 78452 HT MUSCLE IMAGE SPECT MULT: CPT | Performed by: SPECIALIST

## 2022-01-01 PROCEDURE — 99232 SBSQ HOSP IP/OBS MODERATE 35: CPT | Performed by: SPECIALIST

## 2022-01-01 PROCEDURE — 99233 SBSQ HOSP IP/OBS HIGH 50: CPT | Performed by: INTERNAL MEDICINE

## 2022-01-01 PROCEDURE — 84484 ASSAY OF TROPONIN QUANT: CPT | Performed by: EMERGENCY MEDICINE

## 2022-01-01 PROCEDURE — 73502 X-RAY EXAM HIP UNI 2-3 VIEWS: CPT | Performed by: RADIOLOGY

## 2022-01-01 PROCEDURE — 80048 BASIC METABOLIC PNL TOTAL CA: CPT | Performed by: INTERNAL MEDICINE

## 2022-01-01 PROCEDURE — 94760 N-INVAS EAR/PLS OXIMETRY 1: CPT

## 2022-01-01 PROCEDURE — 25010000002 HEPARIN (PORCINE) PER 1000 UNITS: Performed by: INTERNAL MEDICINE

## 2022-01-01 PROCEDURE — 99213 OFFICE O/P EST LOW 20 MIN: CPT | Performed by: PHYSICIAN ASSISTANT

## 2022-01-01 PROCEDURE — 93018 CV STRESS TEST I&R ONLY: CPT | Performed by: SPECIALIST

## 2022-01-01 PROCEDURE — 81001 URINALYSIS AUTO W/SCOPE: CPT | Performed by: NURSE PRACTITIONER

## 2022-01-01 PROCEDURE — 99283 EMERGENCY DEPT VISIT LOW MDM: CPT

## 2022-01-01 PROCEDURE — 71275 CT ANGIOGRAPHY CHEST: CPT

## 2022-01-01 PROCEDURE — 83690 ASSAY OF LIPASE: CPT | Performed by: NURSE PRACTITIONER

## 2022-01-01 PROCEDURE — 25010000002 DEXAMETHASONE PER 1 MG: Performed by: STUDENT IN AN ORGANIZED HEALTH CARE EDUCATION/TRAINING PROGRAM

## 2022-01-01 PROCEDURE — 93005 ELECTROCARDIOGRAM TRACING: CPT

## 2022-01-01 PROCEDURE — 87635 SARS-COV-2 COVID-19 AMP PRB: CPT | Performed by: INTERNAL MEDICINE

## 2022-01-01 PROCEDURE — C1887 CATHETER, GUIDING: HCPCS | Performed by: INTERNAL MEDICINE

## 2022-01-01 PROCEDURE — A9500 TC99M SESTAMIBI: HCPCS | Performed by: INTERNAL MEDICINE

## 2022-01-01 PROCEDURE — 80053 COMPREHEN METABOLIC PANEL: CPT | Performed by: EMERGENCY MEDICINE

## 2022-01-01 PROCEDURE — 25010000002 IOPAMIDOL 61 % SOLUTION: Performed by: EMERGENCY MEDICINE

## 2022-01-01 PROCEDURE — 87040 BLOOD CULTURE FOR BACTERIA: CPT | Performed by: NURSE PRACTITIONER

## 2022-01-01 PROCEDURE — B2111ZZ FLUOROSCOPY OF MULTIPLE CORONARY ARTERIES USING LOW OSMOLAR CONTRAST: ICD-10-PCS | Performed by: INTERNAL MEDICINE

## 2022-01-01 PROCEDURE — 83735 ASSAY OF MAGNESIUM: CPT | Performed by: INTERNAL MEDICINE

## 2022-01-01 PROCEDURE — 25010000002 MAGNESIUM SULFATE IN D5W 1G/100ML (PREMIX) 1-5 GM/100ML-% SOLUTION: Performed by: NURSE PRACTITIONER

## 2022-01-01 PROCEDURE — 25010000002 MAGNESIUM SULFATE IN D5W 1G/100ML (PREMIX) 1-5 GM/100ML-% SOLUTION: Performed by: INTERNAL MEDICINE

## 2022-01-01 PROCEDURE — 73502 X-RAY EXAM HIP UNI 2-3 VIEWS: CPT

## 2022-01-01 PROCEDURE — 25010000002 AZITHROMYCIN PER 500 MG: Performed by: NURSE PRACTITIONER

## 2022-01-01 PROCEDURE — 83735 ASSAY OF MAGNESIUM: CPT | Performed by: NURSE PRACTITIONER

## 2022-01-01 PROCEDURE — 99153 MOD SED SAME PHYS/QHP EA: CPT | Performed by: INTERNAL MEDICINE

## 2022-01-01 PROCEDURE — 71045 X-RAY EXAM CHEST 1 VIEW: CPT | Performed by: RADIOLOGY

## 2022-01-01 PROCEDURE — 80053 COMPREHEN METABOLIC PANEL: CPT | Performed by: STUDENT IN AN ORGANIZED HEALTH CARE EDUCATION/TRAINING PROGRAM

## 2022-01-01 PROCEDURE — 93005 ELECTROCARDIOGRAM TRACING: CPT | Performed by: EMERGENCY MEDICINE

## 2022-01-01 PROCEDURE — 85651 RBC SED RATE NONAUTOMATED: CPT | Performed by: NURSE PRACTITIONER

## 2022-01-01 PROCEDURE — C1760 CLOSURE DEV, VASC: HCPCS | Performed by: INTERNAL MEDICINE

## 2022-01-01 PROCEDURE — C1725 CATH, TRANSLUMIN NON-LASER: HCPCS | Performed by: INTERNAL MEDICINE

## 2022-01-01 PROCEDURE — 78452 HT MUSCLE IMAGE SPECT MULT: CPT

## 2022-01-01 PROCEDURE — 85347 COAGULATION TIME ACTIVATED: CPT

## 2022-01-01 DEVICE — XIENCE SKYPOINT™ EVEROLIMUS ELUTING CORONARY STENT SYSTEM 3.25 MM X 23 MM / RAPID-EXCHANGE
Type: IMPLANTABLE DEVICE | Status: FUNCTIONAL
Brand: XIENCE SKYPOINT™

## 2022-01-01 RX ORDER — PROCHLORPERAZINE EDISYLATE 5 MG/ML
5 INJECTION INTRAMUSCULAR; INTRAVENOUS ONCE
Status: COMPLETED | OUTPATIENT
Start: 2022-01-01 | End: 2022-01-01

## 2022-01-01 RX ORDER — DUPILUMAB 300 MG/2ML
300 INJECTION, SOLUTION SUBCUTANEOUS
COMMUNITY
End: 2022-01-01

## 2022-01-01 RX ORDER — ACETAMINOPHEN 325 MG/1
650 TABLET ORAL EVERY 4 HOURS PRN
Status: DISCONTINUED | OUTPATIENT
Start: 2022-01-01 | End: 2022-01-01 | Stop reason: HOSPADM

## 2022-01-01 RX ORDER — HYDROMORPHONE HYDROCHLORIDE 2 MG/1
4 TABLET ORAL DAILY PRN
Status: CANCELLED | OUTPATIENT
Start: 2022-01-01

## 2022-01-01 RX ORDER — MIDAZOLAM HYDROCHLORIDE 1 MG/ML
INJECTION INTRAMUSCULAR; INTRAVENOUS AS NEEDED
Status: DISCONTINUED | OUTPATIENT
Start: 2022-01-01 | End: 2022-01-01 | Stop reason: HOSPADM

## 2022-01-01 RX ORDER — ALUMINA, MAGNESIA, AND SIMETHICONE 2400; 2400; 240 MG/30ML; MG/30ML; MG/30ML
15 SUSPENSION ORAL ONCE
Status: COMPLETED | OUTPATIENT
Start: 2022-01-01 | End: 2022-01-01

## 2022-01-01 RX ORDER — SEMAGLUTIDE 1.34 MG/ML
INJECTION, SOLUTION SUBCUTANEOUS
COMMUNITY
Start: 2022-01-01

## 2022-01-01 RX ORDER — ERYTHROMYCIN 5 MG/G
OINTMENT OPHTHALMIC
COMMUNITY
Start: 2022-01-01

## 2022-01-01 RX ORDER — LISINOPRIL 2.5 MG/1
5 TABLET ORAL NIGHTLY
Status: DISCONTINUED | OUTPATIENT
Start: 2022-01-01 | End: 2022-01-01

## 2022-01-01 RX ORDER — LIDOCAINE HYDROCHLORIDE 10 MG/ML
2 INJECTION, SOLUTION INFILTRATION; PERINEURAL
Status: COMPLETED | OUTPATIENT
Start: 2022-01-01 | End: 2022-01-01

## 2022-01-01 RX ORDER — IPRATROPIUM BROMIDE AND ALBUTEROL SULFATE 2.5; .5 MG/3ML; MG/3ML
SOLUTION RESPIRATORY (INHALATION)
Qty: 360 ML | Refills: 1 | OUTPATIENT
Start: 2022-01-01

## 2022-01-01 RX ORDER — DUPILUMAB 300 MG/2ML
INJECTION, SOLUTION SUBCUTANEOUS
COMMUNITY
Start: 2022-01-01 | End: 2022-01-01

## 2022-01-01 RX ORDER — FUROSEMIDE 40 MG/1
40 TABLET ORAL DAILY PRN
Qty: 30 TABLET | Refills: 5 | Status: SHIPPED | OUTPATIENT
Start: 2022-01-01 | End: 2022-01-01

## 2022-01-01 RX ORDER — BUDESONIDE AND FORMOTEROL FUMARATE DIHYDRATE 80; 4.5 UG/1; UG/1
2 AEROSOL RESPIRATORY (INHALATION)
Status: CANCELLED | OUTPATIENT
Start: 2022-01-01

## 2022-01-01 RX ORDER — HYDROXYZINE HYDROCHLORIDE 25 MG/1
25 TABLET, FILM COATED ORAL 2 TIMES DAILY PRN
COMMUNITY
Start: 2022-01-01

## 2022-01-01 RX ORDER — SPIRONOLACTONE 25 MG/1
25 TABLET ORAL DAILY
Qty: 90 TABLET | Refills: 1 | Status: SHIPPED | OUTPATIENT
Start: 2022-01-01 | End: 2022-01-01

## 2022-01-01 RX ORDER — ASPIRIN 81 MG/1
324 TABLET, CHEWABLE ORAL ONCE
Status: COMPLETED | OUTPATIENT
Start: 2022-01-01 | End: 2022-01-01

## 2022-01-01 RX ORDER — METHYLPREDNISOLONE ACETATE 40 MG/ML
40 INJECTION, SUSPENSION INTRA-ARTICULAR; INTRALESIONAL; INTRAMUSCULAR; SOFT TISSUE
Status: COMPLETED | OUTPATIENT
Start: 2022-01-01 | End: 2022-01-01

## 2022-01-01 RX ORDER — PANTOPRAZOLE SODIUM 40 MG/1
40 TABLET, DELAYED RELEASE ORAL EVERY MORNING
Status: DISCONTINUED | OUTPATIENT
Start: 2022-01-01 | End: 2022-01-01 | Stop reason: HOSPADM

## 2022-01-01 RX ORDER — VORTIOXETINE 20 MG/1
TABLET, FILM COATED ORAL
Qty: 30 TABLET | Refills: 3 | Status: ON HOLD | OUTPATIENT
Start: 2022-01-01 | End: 2022-01-01

## 2022-01-01 RX ORDER — CETIRIZINE HYDROCHLORIDE 10 MG/1
10 TABLET ORAL DAILY
Status: CANCELLED | OUTPATIENT
Start: 2022-01-01

## 2022-01-01 RX ORDER — LISINOPRIL 2.5 MG/1
5 TABLET ORAL DAILY
Status: CANCELLED | OUTPATIENT
Start: 2022-01-01

## 2022-01-01 RX ORDER — LEVETIRACETAM 500 MG/1
1000 TABLET ORAL 2 TIMES DAILY
Status: DISCONTINUED | OUTPATIENT
Start: 2022-01-01 | End: 2022-01-01 | Stop reason: HOSPADM

## 2022-01-01 RX ORDER — LABETALOL HYDROCHLORIDE 5 MG/ML
10 INJECTION, SOLUTION INTRAVENOUS ONCE
Status: DISCONTINUED | OUTPATIENT
Start: 2022-01-01 | End: 2022-01-01

## 2022-01-01 RX ORDER — FENTANYL CITRATE 50 UG/ML
INJECTION, SOLUTION INTRAMUSCULAR; INTRAVENOUS AS NEEDED
Status: DISCONTINUED | OUTPATIENT
Start: 2022-01-01 | End: 2022-01-01 | Stop reason: HOSPADM

## 2022-01-01 RX ORDER — CETIRIZINE HYDROCHLORIDE 10 MG/1
10 TABLET ORAL NIGHTLY
Status: DISCONTINUED | OUTPATIENT
Start: 2022-01-01 | End: 2022-01-01 | Stop reason: HOSPADM

## 2022-01-01 RX ORDER — OMEPRAZOLE 20 MG/1
20 CAPSULE, DELAYED RELEASE ORAL DAILY
COMMUNITY
Start: 2022-01-01

## 2022-01-01 RX ORDER — SUMATRIPTAN 50 MG/1
50 TABLET, FILM COATED ORAL
COMMUNITY
End: 2022-01-01

## 2022-01-01 RX ORDER — ARIPIPRAZOLE 5 MG/1
5 TABLET ORAL
COMMUNITY
Start: 2022-01-01

## 2022-01-01 RX ORDER — IPRATROPIUM BROMIDE AND ALBUTEROL SULFATE 2.5; .5 MG/3ML; MG/3ML
3 SOLUTION RESPIRATORY (INHALATION) 4 TIMES DAILY PRN
Qty: 360 ML | Refills: 1 | Status: SHIPPED | OUTPATIENT
Start: 2022-01-01

## 2022-01-01 RX ORDER — LIDOCAINE HYDROCHLORIDE 20 MG/ML
15 SOLUTION OROPHARYNGEAL ONCE
Status: COMPLETED | OUTPATIENT
Start: 2022-01-01 | End: 2022-01-01

## 2022-01-01 RX ORDER — OMEPRAZOLE 20 MG/1
20 CAPSULE, DELAYED RELEASE ORAL DAILY
Qty: 90 CAPSULE | Refills: 1 | Status: SHIPPED | OUTPATIENT
Start: 2022-01-01 | End: 2022-01-01

## 2022-01-01 RX ORDER — SODIUM CHLORIDE 9 MG/ML
100 INJECTION, SOLUTION INTRAVENOUS ONCE
Status: COMPLETED | OUTPATIENT
Start: 2022-01-01 | End: 2022-01-01

## 2022-01-01 RX ORDER — TIZANIDINE 4 MG/1
8 TABLET ORAL NIGHTLY PRN
Status: CANCELLED | OUTPATIENT
Start: 2022-01-01

## 2022-01-01 RX ORDER — BUMETANIDE 2 MG/1
1 TABLET ORAL 2 TIMES DAILY
COMMUNITY
Start: 2022-01-01 | End: 2022-01-01

## 2022-01-01 RX ORDER — SODIUM CHLORIDE 9 MG/ML
INJECTION, SOLUTION INTRAVENOUS CONTINUOUS PRN
Status: COMPLETED | OUTPATIENT
Start: 2022-01-01 | End: 2022-01-01

## 2022-01-01 RX ORDER — RANOLAZINE 500 MG/1
1000 TABLET, EXTENDED RELEASE ORAL EVERY 12 HOURS SCHEDULED
Status: DISCONTINUED | OUTPATIENT
Start: 2022-01-01 | End: 2022-01-01

## 2022-01-01 RX ORDER — VITAMIN E 268 MG
400 CAPSULE ORAL DAILY
Status: DISCONTINUED | OUTPATIENT
Start: 2022-01-01 | End: 2022-01-01 | Stop reason: HOSPADM

## 2022-01-01 RX ORDER — ALBUTEROL SULFATE 90 UG/1
AEROSOL, METERED RESPIRATORY (INHALATION)
Qty: 8.5 G | Refills: 2 | Status: SHIPPED | OUTPATIENT
Start: 2022-01-01 | End: 2022-01-01 | Stop reason: SDUPTHER

## 2022-01-01 RX ORDER — RANOLAZINE 500 MG/1
500 TABLET, EXTENDED RELEASE ORAL 2 TIMES DAILY
Qty: 60 TABLET | Refills: 0 | Status: SHIPPED | OUTPATIENT
Start: 2022-01-01 | End: 2022-01-01 | Stop reason: SDUPTHER

## 2022-01-01 RX ORDER — ASPIRIN 81 MG/1
81 TABLET ORAL DAILY
Status: DISCONTINUED | OUTPATIENT
Start: 2022-01-01 | End: 2022-01-01 | Stop reason: HOSPADM

## 2022-01-01 RX ORDER — ENOXAPARIN SODIUM 100 MG/ML
40 INJECTION SUBCUTANEOUS EVERY 24 HOURS
Status: DISCONTINUED | OUTPATIENT
Start: 2022-01-01 | End: 2022-01-01

## 2022-01-01 RX ORDER — SODIUM CHLORIDE 9 MG/ML
250 INJECTION, SOLUTION INTRAVENOUS ONCE AS NEEDED
Status: DISCONTINUED | OUTPATIENT
Start: 2022-01-01 | End: 2022-01-01 | Stop reason: HOSPADM

## 2022-01-01 RX ORDER — METOPROLOL SUCCINATE 50 MG/1
200 TABLET, EXTENDED RELEASE ORAL
Status: DISCONTINUED | OUTPATIENT
Start: 2022-01-01 | End: 2022-01-01 | Stop reason: HOSPADM

## 2022-01-01 RX ORDER — CLOPIDOGREL BISULFATE 75 MG/1
TABLET ORAL AS NEEDED
Status: DISCONTINUED | OUTPATIENT
Start: 2022-01-01 | End: 2022-01-01 | Stop reason: HOSPADM

## 2022-01-01 RX ORDER — CLOPIDOGREL BISULFATE 75 MG/1
75 TABLET ORAL DAILY
Status: DISCONTINUED | OUTPATIENT
Start: 2022-01-01 | End: 2022-01-01 | Stop reason: HOSPADM

## 2022-01-01 RX ORDER — CEPHALEXIN 500 MG/1
CAPSULE ORAL
COMMUNITY
Start: 2022-01-01

## 2022-01-01 RX ORDER — FUROSEMIDE 40 MG/1
40 TABLET ORAL DAILY PRN
Status: CANCELLED | OUTPATIENT
Start: 2022-01-01

## 2022-01-01 RX ORDER — DUPILUMAB 300 MG/2ML
INJECTION, SOLUTION SUBCUTANEOUS
Qty: 4 EACH | Refills: 4 | Status: SHIPPED | OUTPATIENT
Start: 2022-01-01 | End: 2022-01-01 | Stop reason: SDUPTHER

## 2022-01-01 RX ORDER — NITROGLYCERIN 400 UG/1
1 SPRAY ORAL
Qty: 4.9 G | Refills: 1 | Status: SHIPPED | OUTPATIENT
Start: 2022-01-01 | End: 2022-01-01

## 2022-01-01 RX ORDER — ALBUTEROL SULFATE 90 UG/1
2 AEROSOL, METERED RESPIRATORY (INHALATION) EVERY 4 HOURS PRN
Qty: 8 G | Refills: 5 | Status: SHIPPED | OUTPATIENT
Start: 2022-01-01 | End: 2022-01-01

## 2022-01-01 RX ORDER — RANOLAZINE 500 MG/1
1000 TABLET, EXTENDED RELEASE ORAL EVERY 12 HOURS SCHEDULED
Status: DISCONTINUED | OUTPATIENT
Start: 2022-01-01 | End: 2022-01-01 | Stop reason: HOSPADM

## 2022-01-01 RX ORDER — ATORVASTATIN CALCIUM 40 MG/1
80 TABLET, FILM COATED ORAL NIGHTLY
Status: CANCELLED | OUTPATIENT
Start: 2022-01-01

## 2022-01-01 RX ORDER — HYDROMORPHONE HYDROCHLORIDE 2 MG/1
2 TABLET ORAL DAILY PRN
COMMUNITY
Start: 2022-01-01 | End: 2022-01-01

## 2022-01-01 RX ORDER — MAGNESIUM SULFATE 1 G/100ML
1 INJECTION INTRAVENOUS ONCE
Status: COMPLETED | OUTPATIENT
Start: 2022-01-01 | End: 2022-01-01

## 2022-01-01 RX ORDER — SPIRONOLACTONE 25 MG/1
25 TABLET ORAL DAILY
Status: CANCELLED | OUTPATIENT
Start: 2022-01-01

## 2022-01-01 RX ORDER — DUPILUMAB 300 MG/2ML
INJECTION, SOLUTION SUBCUTANEOUS
COMMUNITY
Start: 2022-01-01

## 2022-01-01 RX ORDER — HYDROMORPHONE HYDROCHLORIDE 1 MG/ML
0.5 INJECTION, SOLUTION INTRAMUSCULAR; INTRAVENOUS; SUBCUTANEOUS ONCE
Status: COMPLETED | OUTPATIENT
Start: 2022-01-01 | End: 2022-01-01

## 2022-01-01 RX ORDER — HYDROMORPHONE HYDROCHLORIDE 2 MG/1
4 TABLET ORAL DAILY PRN
Status: DISCONTINUED | OUTPATIENT
Start: 2022-01-01 | End: 2022-01-01 | Stop reason: HOSPADM

## 2022-01-01 RX ORDER — TIZANIDINE 4 MG/1
8 TABLET ORAL NIGHTLY PRN
Status: DISCONTINUED | OUTPATIENT
Start: 2022-01-01 | End: 2022-01-01 | Stop reason: HOSPADM

## 2022-01-01 RX ORDER — DIPHENHYDRAMINE HYDROCHLORIDE 50 MG/ML
12.5 INJECTION INTRAMUSCULAR; INTRAVENOUS ONCE
Status: COMPLETED | OUTPATIENT
Start: 2022-01-01 | End: 2022-01-01

## 2022-01-01 RX ORDER — BUDESONIDE AND FORMOTEROL FUMARATE DIHYDRATE 80; 4.5 UG/1; UG/1
2 AEROSOL RESPIRATORY (INHALATION)
Qty: 30.6 G | Refills: 1 | Status: SHIPPED | OUTPATIENT
Start: 2022-01-01

## 2022-01-01 RX ORDER — HEPARIN SODIUM 1000 [USP'U]/ML
INJECTION, SOLUTION INTRAVENOUS; SUBCUTANEOUS AS NEEDED
Status: DISCONTINUED | OUTPATIENT
Start: 2022-01-01 | End: 2022-01-01 | Stop reason: HOSPADM

## 2022-01-01 RX ORDER — LISINOPRIL 10 MG/1
20 TABLET ORAL NIGHTLY
Status: DISCONTINUED | OUTPATIENT
Start: 2022-01-01 | End: 2022-01-01

## 2022-01-01 RX ORDER — ZAFIRLUKAST 20 MG/1
20 TABLET, FILM COATED ORAL 2 TIMES DAILY
COMMUNITY
End: 2022-01-01 | Stop reason: SDUPTHER

## 2022-01-01 RX ORDER — ZAFIRLUKAST 20 MG/1
TABLET, FILM COATED ORAL
Qty: 180 TABLET | Refills: 1 | Status: SHIPPED | OUTPATIENT
Start: 2022-01-01

## 2022-01-01 RX ORDER — IPRATROPIUM BROMIDE AND ALBUTEROL SULFATE 2.5; .5 MG/3ML; MG/3ML
3 SOLUTION RESPIRATORY (INHALATION) 4 TIMES DAILY PRN
Qty: 360 ML | Refills: 1 | Status: SHIPPED | OUTPATIENT
Start: 2022-01-01 | End: 2022-01-01 | Stop reason: SDUPTHER

## 2022-01-01 RX ORDER — NITROGLYCERIN 0.4 MG/1
0.4 TABLET SUBLINGUAL
Status: COMPLETED | OUTPATIENT
Start: 2022-01-01 | End: 2022-01-01

## 2022-01-01 RX ORDER — LAMOTRIGINE 100 MG/1
50 TABLET ORAL EVERY EVENING
Status: CANCELLED | OUTPATIENT
Start: 2022-01-01

## 2022-01-01 RX ORDER — ZAFIRLUKAST 20 MG/1
20 TABLET, FILM COATED ORAL 2 TIMES DAILY
Qty: 180 TABLET | Refills: 1 | Status: SHIPPED | OUTPATIENT
Start: 2022-01-01 | End: 2022-01-01

## 2022-01-01 RX ORDER — SODIUM CHLORIDE 0.9 % (FLUSH) 0.9 %
10 SYRINGE (ML) INJECTION AS NEEDED
Status: DISCONTINUED | OUTPATIENT
Start: 2022-01-01 | End: 2022-01-01 | Stop reason: HOSPADM

## 2022-01-01 RX ORDER — PANTOPRAZOLE SODIUM 40 MG/1
40 TABLET, DELAYED RELEASE ORAL EVERY MORNING
Qty: 30 TABLET | Refills: 0 | Status: SHIPPED | OUTPATIENT
Start: 2022-01-01 | End: 2022-01-01

## 2022-01-01 RX ORDER — METOPROLOL SUCCINATE 50 MG/1
200 TABLET, EXTENDED RELEASE ORAL EVERY 24 HOURS
Status: CANCELLED | OUTPATIENT
Start: 2022-01-01

## 2022-01-01 RX ORDER — SPIRONOLACTONE 25 MG/1
TABLET ORAL
Qty: 90 TABLET | Refills: 1 | OUTPATIENT
Start: 2022-01-01

## 2022-01-01 RX ORDER — ISOSORBIDE MONONITRATE 60 MG/1
60 TABLET, EXTENDED RELEASE ORAL
Status: DISCONTINUED | OUTPATIENT
Start: 2022-01-01 | End: 2022-01-01

## 2022-01-01 RX ORDER — NITROGLYCERIN 0.4 MG/1
0.4 TABLET SUBLINGUAL
Status: DISCONTINUED | OUTPATIENT
Start: 2022-01-01 | End: 2022-01-01

## 2022-01-01 RX ORDER — CYANOCOBALAMIN 1000 UG/ML
INJECTION, SOLUTION INTRAMUSCULAR; SUBCUTANEOUS
COMMUNITY
Start: 2022-01-01

## 2022-01-01 RX ORDER — RANOLAZINE 500 MG/1
500 TABLET, EXTENDED RELEASE ORAL 2 TIMES DAILY
Qty: 60 TABLET | Refills: 1 | Status: SHIPPED | OUTPATIENT
Start: 2022-01-01 | End: 2022-01-01

## 2022-01-01 RX ORDER — ALBUTEROL SULFATE 2.5 MG/3ML
2.5 SOLUTION RESPIRATORY (INHALATION) EVERY 4 HOURS PRN
Status: CANCELLED | OUTPATIENT
Start: 2022-01-01

## 2022-01-01 RX ORDER — VORTIOXETINE 20 MG/1
20 TABLET, FILM COATED ORAL DAILY
COMMUNITY

## 2022-01-01 RX ORDER — NITROGLYCERIN 0.4 MG/1
0.4 TABLET SUBLINGUAL
Status: DISCONTINUED | OUTPATIENT
Start: 2022-01-01 | End: 2022-01-01 | Stop reason: HOSPADM

## 2022-01-01 RX ORDER — BUDESONIDE AND FORMOTEROL FUMARATE DIHYDRATE 80; 4.5 UG/1; UG/1
2 AEROSOL RESPIRATORY (INHALATION)
Status: DISCONTINUED | OUTPATIENT
Start: 2022-01-01 | End: 2022-01-01 | Stop reason: HOSPADM

## 2022-01-01 RX ORDER — SPIRONOLACTONE 25 MG/1
TABLET ORAL
Qty: 90 TABLET | Refills: 1 | Status: SHIPPED | OUTPATIENT
Start: 2022-01-01

## 2022-01-01 RX ORDER — SODIUM CHLORIDE 0.9 % (FLUSH) 0.9 %
10 SYRINGE (ML) INJECTION EVERY 12 HOURS SCHEDULED
Status: DISCONTINUED | OUTPATIENT
Start: 2022-01-01 | End: 2022-01-01 | Stop reason: HOSPADM

## 2022-01-01 RX ORDER — PREDNISONE 20 MG/1
20 TABLET ORAL 2 TIMES DAILY
Qty: 10 TABLET | Refills: 0 | Status: ON HOLD | OUTPATIENT
Start: 2022-01-01 | End: 2022-01-01

## 2022-01-01 RX ORDER — METOPROLOL SUCCINATE 200 MG/1
200 TABLET, EXTENDED RELEASE ORAL EVERY 24 HOURS
Qty: 90 TABLET | Refills: 1 | Status: SHIPPED | OUTPATIENT
Start: 2022-01-01 | End: 2022-01-01 | Stop reason: SDUPTHER

## 2022-01-01 RX ORDER — LIDOCAINE 50 MG/G
1 PATCH TOPICAL
Status: DISCONTINUED | OUTPATIENT
Start: 2022-01-01 | End: 2022-01-01 | Stop reason: HOSPADM

## 2022-01-01 RX ORDER — METOPROLOL SUCCINATE 200 MG/1
200 TABLET, EXTENDED RELEASE ORAL EVERY 24 HOURS
Qty: 90 TABLET | Refills: 1 | Status: SHIPPED | OUTPATIENT
Start: 2022-01-01

## 2022-01-01 RX ORDER — SPIRONOLACTONE 25 MG/1
25 TABLET ORAL DAILY
Status: DISCONTINUED | OUTPATIENT
Start: 2022-01-01 | End: 2022-01-01 | Stop reason: HOSPADM

## 2022-01-01 RX ORDER — LAMOTRIGINE 100 MG/1
100 TABLET ORAL EVERY MORNING
Status: CANCELLED | OUTPATIENT
Start: 2022-01-01

## 2022-01-01 RX ORDER — CLOPIDOGREL BISULFATE 75 MG/1
75 TABLET ORAL DAILY
Qty: 90 TABLET | Refills: 1 | Status: SHIPPED | OUTPATIENT
Start: 2022-01-01

## 2022-01-01 RX ORDER — HYDROMORPHONE HYDROCHLORIDE 4 MG/1
4 TABLET ORAL DAILY PRN
COMMUNITY
Start: 2022-01-01

## 2022-01-01 RX ORDER — GABAPENTIN 300 MG/1
600 CAPSULE ORAL EVERY 8 HOURS SCHEDULED
Status: DISCONTINUED | OUTPATIENT
Start: 2022-01-01 | End: 2022-01-01 | Stop reason: HOSPADM

## 2022-01-01 RX ORDER — FUROSEMIDE 40 MG/1
TABLET ORAL
Qty: 30 TABLET | Refills: 3 | Status: SHIPPED | OUTPATIENT
Start: 2022-01-01 | End: 2022-01-01

## 2022-01-01 RX ORDER — PANTOPRAZOLE SODIUM 40 MG/1
40 TABLET, DELAYED RELEASE ORAL EVERY MORNING
Status: CANCELLED | OUTPATIENT
Start: 2022-01-01

## 2022-01-01 RX ORDER — ISOSORBIDE MONONITRATE 60 MG/1
60 TABLET, EXTENDED RELEASE ORAL DAILY
Status: CANCELLED | OUTPATIENT
Start: 2022-01-01

## 2022-01-01 RX ORDER — LISINOPRIL 2.5 MG/1
5 TABLET ORAL NIGHTLY
Status: CANCELLED | OUTPATIENT
Start: 2022-01-01

## 2022-01-01 RX ORDER — LAMOTRIGINE 100 MG/1
50 TABLET ORAL EVERY EVENING
Status: DISCONTINUED | OUTPATIENT
Start: 2022-01-01 | End: 2022-01-01 | Stop reason: HOSPADM

## 2022-01-01 RX ORDER — RANOLAZINE 500 MG/1
500 TABLET, EXTENDED RELEASE ORAL EVERY 12 HOURS SCHEDULED
Status: DISCONTINUED | OUTPATIENT
Start: 2022-01-01 | End: 2022-01-01

## 2022-01-01 RX ORDER — POTASSIUM CHLORIDE 20 MEQ/1
20 TABLET, EXTENDED RELEASE ORAL DAILY PRN
Qty: 30 TABLET | Refills: 5 | Status: SHIPPED | OUTPATIENT
Start: 2022-01-01

## 2022-01-01 RX ORDER — LEVOFLOXACIN 500 MG/1
500 TABLET, FILM COATED ORAL DAILY
Qty: 7 TABLET | Refills: 0 | Status: ON HOLD | OUTPATIENT
Start: 2022-01-01 | End: 2022-01-01

## 2022-01-01 RX ORDER — ATORVASTATIN CALCIUM 80 MG/1
80 TABLET, FILM COATED ORAL NIGHTLY
Qty: 90 TABLET | Refills: 1 | Status: SHIPPED | OUTPATIENT
Start: 2022-01-01

## 2022-01-01 RX ORDER — PREDNISONE 20 MG/1
2 TABLET ORAL DAILY
COMMUNITY
Start: 2022-01-01 | End: 2023-01-01

## 2022-01-01 RX ORDER — SUMATRIPTAN 50 MG/1
50 TABLET, FILM COATED ORAL
Status: CANCELLED | OUTPATIENT
Start: 2022-01-01

## 2022-01-01 RX ORDER — ISOSORBIDE MONONITRATE 60 MG/1
60 TABLET, EXTENDED RELEASE ORAL DAILY
COMMUNITY
End: 2022-01-01 | Stop reason: HOSPADM

## 2022-01-01 RX ORDER — TIZANIDINE 4 MG/1
4 TABLET ORAL NIGHTLY PRN
Qty: 30 TABLET | Refills: 3 | Status: ON HOLD | OUTPATIENT
Start: 2022-01-01 | End: 2022-01-01

## 2022-01-01 RX ORDER — SODIUM CHLORIDE 9 MG/ML
100 INJECTION, SOLUTION INTRAVENOUS CONTINUOUS
Status: DISCONTINUED | OUTPATIENT
Start: 2022-01-01 | End: 2022-01-01 | Stop reason: HOSPADM

## 2022-01-01 RX ORDER — LEVETIRACETAM 500 MG/1
1000 TABLET ORAL 2 TIMES DAILY
Status: CANCELLED | OUTPATIENT
Start: 2022-01-01

## 2022-01-01 RX ORDER — LIDOCAINE HYDROCHLORIDE 20 MG/ML
INJECTION, SOLUTION INFILTRATION; PERINEURAL AS NEEDED
Status: DISCONTINUED | OUTPATIENT
Start: 2022-01-01 | End: 2022-01-01 | Stop reason: HOSPADM

## 2022-01-01 RX ORDER — CLOPIDOGREL BISULFATE 75 MG/1
75 TABLET ORAL DAILY
Status: CANCELLED | OUTPATIENT
Start: 2022-01-01

## 2022-01-01 RX ORDER — ISOSORBIDE MONONITRATE 30 MG/1
30 TABLET, EXTENDED RELEASE ORAL
Status: DISCONTINUED | OUTPATIENT
Start: 2022-01-01 | End: 2022-01-01

## 2022-01-01 RX ORDER — NITROGLYCERIN 400 UG/1
SPRAY ORAL
Qty: 4.9 G | Refills: 1 | Status: SHIPPED | OUTPATIENT
Start: 2022-01-01

## 2022-01-01 RX ORDER — LISINOPRIL 5 MG/1
5 TABLET ORAL
COMMUNITY

## 2022-01-01 RX ORDER — ISOSORBIDE MONONITRATE 120 MG/1
120 TABLET, EXTENDED RELEASE ORAL
Qty: 90 TABLET | Refills: 3 | Status: SHIPPED | OUTPATIENT
Start: 2022-01-01 | End: 2023-01-01

## 2022-01-01 RX ORDER — TIOTROPIUM BROMIDE INHALATION SPRAY 1.56 UG/1
2 SPRAY, METERED RESPIRATORY (INHALATION) DAILY
Qty: 12 G | Refills: 1 | Status: SHIPPED | OUTPATIENT
Start: 2022-01-01

## 2022-01-01 RX ORDER — ALBUTEROL SULFATE 90 UG/1
2 AEROSOL, METERED RESPIRATORY (INHALATION) ONCE
Status: COMPLETED | OUTPATIENT
Start: 2022-01-01 | End: 2022-01-01

## 2022-01-01 RX ORDER — ASCORBIC ACID 500 MG
500 TABLET ORAL DAILY
Status: DISCONTINUED | OUTPATIENT
Start: 2022-01-01 | End: 2022-01-01 | Stop reason: HOSPADM

## 2022-01-01 RX ORDER — HYDROMORPHONE HYDROCHLORIDE 2 MG/1
2 TABLET ORAL DAILY PRN
Status: CANCELLED | OUTPATIENT
Start: 2022-01-01

## 2022-01-01 RX ORDER — VITAMIN E 268 MG
400 CAPSULE ORAL 2 TIMES DAILY
Status: CANCELLED | OUTPATIENT
Start: 2022-01-01

## 2022-01-01 RX ORDER — GABAPENTIN 300 MG/1
600 CAPSULE ORAL EVERY 8 HOURS SCHEDULED
Status: CANCELLED | OUTPATIENT
Start: 2022-01-01

## 2022-01-01 RX ORDER — RANOLAZINE 500 MG/1
TABLET, EXTENDED RELEASE ORAL
Qty: 60 TABLET | Refills: 11 | Status: SHIPPED | OUTPATIENT
Start: 2022-01-01

## 2022-01-01 RX ORDER — CLOPIDOGREL BISULFATE 75 MG/1
75 TABLET ORAL DAILY
Qty: 90 TABLET | Refills: 1 | Status: SHIPPED | OUTPATIENT
Start: 2022-01-01 | End: 2022-01-01 | Stop reason: SDUPTHER

## 2022-01-01 RX ORDER — TIZANIDINE 4 MG/1
8 TABLET ORAL NIGHTLY PRN
COMMUNITY

## 2022-01-01 RX ORDER — ATORVASTATIN CALCIUM 40 MG/1
80 TABLET, FILM COATED ORAL DAILY
Status: DISCONTINUED | OUTPATIENT
Start: 2022-01-01 | End: 2022-01-01 | Stop reason: HOSPADM

## 2022-01-01 RX ORDER — PHENOBARBITAL, HYOSCYAMINE SULFATE, ATROPINE SULFATE AND SCOPOLAMINE HYDROBROMIDE .0194; .1037; 16.2; .0065 MG/1; MG/1; MG/1; MG/1
2 TABLET ORAL ONCE
Status: COMPLETED | OUTPATIENT
Start: 2022-01-01 | End: 2022-01-01

## 2022-01-01 RX ORDER — PROMETHAZINE HYDROCHLORIDE 25 MG/1
25 TABLET ORAL NIGHTLY PRN
Qty: 10 TABLET | Refills: 0 | Status: ON HOLD | OUTPATIENT
Start: 2022-01-01 | End: 2022-01-01

## 2022-01-01 RX ORDER — LISINOPRIL 5 MG/1
5 TABLET ORAL DAILY
Qty: 90 TABLET | Refills: 1 | Status: ON HOLD | OUTPATIENT
Start: 2022-01-01 | End: 2022-01-01

## 2022-01-01 RX ORDER — ASPIRIN 81 MG/1
81 TABLET ORAL DAILY
Status: CANCELLED | OUTPATIENT
Start: 2022-01-01

## 2022-01-01 RX ORDER — VORTIOXETINE 20 MG/1
TABLET, FILM COATED ORAL
Qty: 30 TABLET | OUTPATIENT
Start: 2022-01-01

## 2022-01-01 RX ORDER — ISOSORBIDE MONONITRATE 60 MG/1
120 TABLET, EXTENDED RELEASE ORAL
Status: DISCONTINUED | OUTPATIENT
Start: 2022-01-01 | End: 2022-01-01 | Stop reason: HOSPADM

## 2022-01-01 RX ORDER — ALBUTEROL SULFATE 90 UG/1
2 AEROSOL, METERED RESPIRATORY (INHALATION)
Qty: 8.5 G | Refills: 2 | Status: SHIPPED | OUTPATIENT
Start: 2022-01-01

## 2022-01-01 RX ORDER — BUMETANIDE 1 MG/1
1 TABLET ORAL 2 TIMES DAILY
Qty: 60 TABLET | Refills: 5 | Status: SHIPPED | OUTPATIENT
Start: 2022-01-01 | End: 2023-01-01

## 2022-01-01 RX ORDER — POTASSIUM CHLORIDE 20 MEQ/1
20 TABLET, EXTENDED RELEASE ORAL DAILY PRN
Status: CANCELLED | OUTPATIENT
Start: 2022-01-01

## 2022-01-01 RX ORDER — LAMOTRIGINE 100 MG/1
50 TABLET ORAL EVERY EVENING
COMMUNITY

## 2022-01-01 RX ORDER — GABAPENTIN 600 MG/1
TABLET ORAL
Qty: 270 TABLET | OUTPATIENT
Start: 2022-01-01

## 2022-01-01 RX ORDER — ISOSORBIDE MONONITRATE 120 MG/1
120 TABLET, EXTENDED RELEASE ORAL
Qty: 30 TABLET | Refills: 0 | Status: SHIPPED | OUTPATIENT
Start: 2022-01-01 | End: 2022-01-01 | Stop reason: SDUPTHER

## 2022-01-01 RX ORDER — LAMOTRIGINE 100 MG/1
100 TABLET ORAL EVERY MORNING
Status: DISCONTINUED | OUTPATIENT
Start: 2022-01-01 | End: 2022-01-01 | Stop reason: HOSPADM

## 2022-01-01 RX ORDER — RANOLAZINE 1000 MG/1
1000 TABLET, EXTENDED RELEASE ORAL EVERY 12 HOURS SCHEDULED
Qty: 60 TABLET | Refills: 0 | Status: SHIPPED | OUTPATIENT
Start: 2022-01-01 | End: 2022-01-01

## 2022-01-01 RX ORDER — DEXAMETHASONE SODIUM PHOSPHATE 10 MG/ML
10 INJECTION, SOLUTION INTRAMUSCULAR; INTRAVENOUS ONCE
Status: COMPLETED | OUTPATIENT
Start: 2022-01-01 | End: 2022-01-01

## 2022-01-01 RX ORDER — LISINOPRIL 2.5 MG/1
5 TABLET ORAL NIGHTLY
Status: DISCONTINUED | OUTPATIENT
Start: 2022-01-01 | End: 2022-01-01 | Stop reason: HOSPADM

## 2022-01-01 RX ORDER — ASPIRIN 81 MG/1
81 TABLET ORAL DAILY
COMMUNITY

## 2022-01-01 RX ORDER — ALBUTEROL SULFATE 90 UG/1
2 AEROSOL, METERED RESPIRATORY (INHALATION)
Qty: 8.5 G | Refills: 2 | Status: SHIPPED | OUTPATIENT
Start: 2022-01-01 | End: 2022-01-01 | Stop reason: SDUPTHER

## 2022-01-01 RX ORDER — ATORVASTATIN CALCIUM 80 MG/1
80 TABLET, FILM COATED ORAL NIGHTLY
Qty: 90 TABLET | Refills: 1 | Status: SHIPPED | OUTPATIENT
Start: 2022-01-01 | End: 2022-01-01 | Stop reason: SDUPTHER

## 2022-01-01 RX ORDER — ASCORBIC ACID 500 MG
500 TABLET ORAL DAILY
Status: CANCELLED | OUTPATIENT
Start: 2022-01-01

## 2022-01-01 RX ORDER — ALBUTEROL SULFATE 2.5 MG/3ML
2.5 SOLUTION RESPIRATORY (INHALATION) EVERY 4 HOURS PRN
Status: DISCONTINUED | OUTPATIENT
Start: 2022-01-01 | End: 2022-01-01 | Stop reason: HOSPADM

## 2022-01-01 RX ORDER — DEXAMETHASONE SODIUM PHOSPHATE 4 MG/ML
8 INJECTION, SOLUTION INTRA-ARTICULAR; INTRALESIONAL; INTRAMUSCULAR; INTRAVENOUS; SOFT TISSUE ONCE
Status: COMPLETED | OUTPATIENT
Start: 2022-01-01 | End: 2022-01-01

## 2022-01-01 RX ORDER — HYDROMORPHONE HYDROCHLORIDE 2 MG/1
1 TABLET ORAL AS NEEDED
COMMUNITY
Start: 2022-01-01

## 2022-01-01 RX ADMIN — BUDESONIDE AND FORMOTEROL FUMARATE DIHYDRATE 2 PUFF: 80; 4.5 AEROSOL RESPIRATORY (INHALATION) at 18:51

## 2022-01-01 RX ADMIN — ASPIRIN 81 MG: 81 TABLET, COATED ORAL at 08:20

## 2022-01-01 RX ADMIN — GABAPENTIN 600 MG: 300 CAPSULE ORAL at 06:13

## 2022-01-01 RX ADMIN — GABAPENTIN 600 MG: 300 CAPSULE ORAL at 06:32

## 2022-01-01 RX ADMIN — CLOPIDOGREL 75 MG: 75 TABLET, FILM COATED ORAL at 08:41

## 2022-01-01 RX ADMIN — CETIRIZINE HYDROCHLORIDE 10 MG: 10 TABLET, FILM COATED ORAL at 21:40

## 2022-01-01 RX ADMIN — ASPIRIN 81 MG: 81 TABLET, COATED ORAL at 15:27

## 2022-01-01 RX ADMIN — BUDESONIDE AND FORMOTEROL FUMARATE DIHYDRATE 2 PUFF: 80; 4.5 AEROSOL RESPIRATORY (INHALATION) at 06:28

## 2022-01-01 RX ADMIN — LAMOTRIGINE 100 MG: 100 TABLET ORAL at 08:36

## 2022-01-01 RX ADMIN — ENOXAPARIN SODIUM 40 MG: 40 INJECTION SUBCUTANEOUS at 14:45

## 2022-01-01 RX ADMIN — CETIRIZINE HYDROCHLORIDE 10 MG: 10 TABLET, FILM COATED ORAL at 21:09

## 2022-01-01 RX ADMIN — CETIRIZINE HYDROCHLORIDE 10 MG: 10 TABLET, FILM COATED ORAL at 20:58

## 2022-01-01 RX ADMIN — ATORVASTATIN CALCIUM 80 MG: 40 TABLET, FILM COATED ORAL at 08:13

## 2022-01-01 RX ADMIN — RANOLAZINE 1000 MG: 500 TABLET, FILM COATED, EXTENDED RELEASE ORAL at 08:03

## 2022-01-01 RX ADMIN — GABAPENTIN 600 MG: 300 CAPSULE ORAL at 16:49

## 2022-01-01 RX ADMIN — BUDESONIDE AND FORMOTEROL FUMARATE DIHYDRATE 2 PUFF: 80; 4.5 AEROSOL RESPIRATORY (INHALATION) at 06:40

## 2022-01-01 RX ADMIN — RANOLAZINE 1000 MG: 500 TABLET, FILM COATED, EXTENDED RELEASE ORAL at 21:08

## 2022-01-01 RX ADMIN — NITROGLYCERIN 0.4 MG: 0.4 TABLET, ORALLY DISINTEGRATING SUBLINGUAL at 16:26

## 2022-01-01 RX ADMIN — LAMOTRIGINE 100 MG: 100 TABLET ORAL at 08:16

## 2022-01-01 RX ADMIN — LEVETIRACETAM 1000 MG: 500 TABLET, FILM COATED ORAL at 08:31

## 2022-01-01 RX ADMIN — Medication 10 ML: at 08:04

## 2022-01-01 RX ADMIN — LEVETIRACETAM 1000 MG: 500 TABLET, FILM COATED ORAL at 08:41

## 2022-01-01 RX ADMIN — GABAPENTIN 600 MG: 300 CAPSULE ORAL at 21:11

## 2022-01-01 RX ADMIN — LAMOTRIGINE 100 MG: 100 TABLET ORAL at 08:44

## 2022-01-01 RX ADMIN — NITROGLYCERIN 0.5 INCH: 20 OINTMENT TOPICAL at 10:44

## 2022-01-01 RX ADMIN — Medication 10 ML: at 21:10

## 2022-01-01 RX ADMIN — ACETAMINOPHEN 650 MG: 325 TABLET ORAL at 16:49

## 2022-01-01 RX ADMIN — ASPIRIN 324 MG: 81 TABLET, CHEWABLE ORAL at 09:18

## 2022-01-01 RX ADMIN — Medication 10 ML: at 08:42

## 2022-01-01 RX ADMIN — GABAPENTIN 600 MG: 300 CAPSULE ORAL at 06:06

## 2022-01-01 RX ADMIN — BUDESONIDE AND FORMOTEROL FUMARATE DIHYDRATE 2 PUFF: 80; 4.5 AEROSOL RESPIRATORY (INHALATION) at 06:24

## 2022-01-01 RX ADMIN — LIDOCAINE 1 PATCH: 50 PATCH TOPICAL at 08:04

## 2022-01-01 RX ADMIN — LAMOTRIGINE 50 MG: 100 TABLET ORAL at 21:11

## 2022-01-01 RX ADMIN — CETIRIZINE HYDROCHLORIDE 10 MG: 10 TABLET, FILM COATED ORAL at 20:17

## 2022-01-01 RX ADMIN — METOPROLOL SUCCINATE 200 MG: 50 TABLET, EXTENDED RELEASE ORAL at 08:20

## 2022-01-01 RX ADMIN — VORTIOXETINE 20 MG: 20 TABLET, FILM COATED ORAL at 08:04

## 2022-01-01 RX ADMIN — LEVETIRACETAM 1000 MG: 500 TABLET, FILM COATED ORAL at 21:09

## 2022-01-01 RX ADMIN — Medication 400 UNITS: at 08:38

## 2022-01-01 RX ADMIN — GABAPENTIN 600 MG: 300 CAPSULE ORAL at 21:40

## 2022-01-01 RX ADMIN — METHYLPREDNISOLONE ACETATE 40 MG: 40 INJECTION, SUSPENSION INTRA-ARTICULAR; INTRALESIONAL; INTRAMUSCULAR; SOFT TISSUE at 11:39

## 2022-01-01 RX ADMIN — ISOSORBIDE MONONITRATE 120 MG: 60 TABLET ORAL at 08:12

## 2022-01-01 RX ADMIN — ENOXAPARIN SODIUM 40 MG: 40 INJECTION SUBCUTANEOUS at 15:27

## 2022-01-01 RX ADMIN — Medication 5000 UNITS: at 08:41

## 2022-01-01 RX ADMIN — VORTIOXETINE 20 MG: 20 TABLET, FILM COATED ORAL at 08:15

## 2022-01-01 RX ADMIN — SPIRONOLACTONE 25 MG: 25 TABLET ORAL at 08:09

## 2022-01-01 RX ADMIN — IOPAMIDOL 100 ML: 612 INJECTION, SOLUTION INTRAVENOUS at 17:54

## 2022-01-01 RX ADMIN — Medication 10 ML: at 21:09

## 2022-01-01 RX ADMIN — CETIRIZINE HYDROCHLORIDE 10 MG: 10 TABLET, FILM COATED ORAL at 21:11

## 2022-01-01 RX ADMIN — Medication 10 ML: at 08:21

## 2022-01-01 RX ADMIN — LEVETIRACETAM 1000 MG: 500 TABLET, FILM COATED ORAL at 20:18

## 2022-01-01 RX ADMIN — ATORVASTATIN CALCIUM 80 MG: 40 TABLET, FILM COATED ORAL at 08:15

## 2022-01-01 RX ADMIN — LIDOCAINE HYDROCHLORIDE 15 ML: 20 SOLUTION ORAL; TOPICAL at 06:47

## 2022-01-01 RX ADMIN — INSULIN HUMAN 150 UNITS: 500 INJECTION, SOLUTION SUBCUTANEOUS at 16:36

## 2022-01-01 RX ADMIN — LEVETIRACETAM 1000 MG: 500 TABLET, FILM COATED ORAL at 08:15

## 2022-01-01 RX ADMIN — MAGNESIUM SULFATE HEPTAHYDRATE 1 G: 1 INJECTION, SOLUTION INTRAVENOUS at 18:37

## 2022-01-01 RX ADMIN — Medication 10 ML: at 21:40

## 2022-01-01 RX ADMIN — SODIUM CHLORIDE 500 MG: 900 INJECTION, SOLUTION INTRAVENOUS at 19:29

## 2022-01-01 RX ADMIN — METHYLPREDNISOLONE ACETATE 40 MG: 40 INJECTION, SUSPENSION INTRA-ARTICULAR; INTRALESIONAL; INTRAMUSCULAR; SOFT TISSUE at 11:40

## 2022-01-01 RX ADMIN — GABAPENTIN 600 MG: 300 CAPSULE ORAL at 06:15

## 2022-01-01 RX ADMIN — DEXAMETHASONE SODIUM PHOSPHATE 8 MG: 4 INJECTION, SOLUTION INTRA-ARTICULAR; INTRALESIONAL; INTRAMUSCULAR; INTRAVENOUS; SOFT TISSUE at 09:57

## 2022-01-01 RX ADMIN — Medication 400 UNITS: at 08:20

## 2022-01-01 RX ADMIN — SPIRONOLACTONE 25 MG: 25 TABLET ORAL at 08:31

## 2022-01-01 RX ADMIN — BUDESONIDE AND FORMOTEROL FUMARATE DIHYDRATE 2 PUFF: 80; 4.5 AEROSOL RESPIRATORY (INHALATION) at 19:14

## 2022-01-01 RX ADMIN — LEVETIRACETAM 1000 MG: 500 TABLET, FILM COATED ORAL at 08:36

## 2022-01-01 RX ADMIN — Medication 5000 UNITS: at 08:31

## 2022-01-01 RX ADMIN — HYDROMORPHONE HYDROCHLORIDE 4 MG: 2 TABLET ORAL at 16:28

## 2022-01-01 RX ADMIN — INSULIN HUMAN 150 UNITS: 500 INJECTION, SOLUTION SUBCUTANEOUS at 10:43

## 2022-01-01 RX ADMIN — Medication 400 UNITS: at 08:43

## 2022-01-01 RX ADMIN — LEVETIRACETAM 1000 MG: 500 TABLET, FILM COATED ORAL at 08:09

## 2022-01-01 RX ADMIN — NITROGLYCERIN 0.4 MG: 0.4 TABLET, ORALLY DISINTEGRATING SUBLINGUAL at 05:20

## 2022-01-01 RX ADMIN — GABAPENTIN 600 MG: 300 CAPSULE ORAL at 21:10

## 2022-01-01 RX ADMIN — INSULIN HUMAN 150 UNITS: 500 INJECTION, SOLUTION SUBCUTANEOUS at 17:21

## 2022-01-01 RX ADMIN — OXYCODONE HYDROCHLORIDE AND ACETAMINOPHEN 500 MG: 500 TABLET ORAL at 08:16

## 2022-01-01 RX ADMIN — SPIRONOLACTONE 25 MG: 25 TABLET ORAL at 08:41

## 2022-01-01 RX ADMIN — Medication 10 ML: at 08:37

## 2022-01-01 RX ADMIN — Medication 10 ML: at 08:11

## 2022-01-01 RX ADMIN — Medication 5000 UNITS: at 08:03

## 2022-01-01 RX ADMIN — GABAPENTIN 600 MG: 300 CAPSULE ORAL at 21:09

## 2022-01-01 RX ADMIN — RANOLAZINE 1000 MG: 500 TABLET, FILM COATED, EXTENDED RELEASE ORAL at 21:45

## 2022-01-01 RX ADMIN — CLOPIDOGREL 75 MG: 75 TABLET, FILM COATED ORAL at 08:03

## 2022-01-01 RX ADMIN — LAMOTRIGINE 50 MG: 100 TABLET ORAL at 20:32

## 2022-01-01 RX ADMIN — LAMOTRIGINE 50 MG: 100 TABLET ORAL at 21:40

## 2022-01-01 RX ADMIN — PANTOPRAZOLE SODIUM 40 MG: 40 TABLET, DELAYED RELEASE ORAL at 06:15

## 2022-01-01 RX ADMIN — BUDESONIDE AND FORMOTEROL FUMARATE DIHYDRATE 2 PUFF: 80; 4.5 AEROSOL RESPIRATORY (INHALATION) at 06:13

## 2022-01-01 RX ADMIN — TIZANIDINE 8 MG: 4 TABLET ORAL at 05:08

## 2022-01-01 RX ADMIN — LEVETIRACETAM 1000 MG: 500 TABLET, FILM COATED ORAL at 21:17

## 2022-01-01 RX ADMIN — CLOPIDOGREL 75 MG: 75 TABLET, FILM COATED ORAL at 08:36

## 2022-01-01 RX ADMIN — RANOLAZINE 500 MG: 500 TABLET, FILM COATED, EXTENDED RELEASE ORAL at 15:33

## 2022-01-01 RX ADMIN — GABAPENTIN 600 MG: 300 CAPSULE ORAL at 15:00

## 2022-01-01 RX ADMIN — PANTOPRAZOLE SODIUM 40 MG: 40 TABLET, DELAYED RELEASE ORAL at 06:13

## 2022-01-01 RX ADMIN — REGADENOSON 0.4 MG: 0.08 INJECTION, SOLUTION INTRAVENOUS at 12:03

## 2022-01-01 RX ADMIN — VORTIOXETINE 20 MG: 20 TABLET, FILM COATED ORAL at 08:16

## 2022-01-01 RX ADMIN — RANOLAZINE 1000 MG: 500 TABLET, FILM COATED, EXTENDED RELEASE ORAL at 21:11

## 2022-01-01 RX ADMIN — ATORVASTATIN CALCIUM 80 MG: 40 TABLET, FILM COATED ORAL at 08:10

## 2022-01-01 RX ADMIN — OXYCODONE HYDROCHLORIDE AND ACETAMINOPHEN 500 MG: 500 TABLET ORAL at 08:15

## 2022-01-01 RX ADMIN — VORTIOXETINE 20 MG: 20 TABLET, FILM COATED ORAL at 08:11

## 2022-01-01 RX ADMIN — NITROGLYCERIN 1 INCH: 20 OINTMENT TOPICAL at 18:36

## 2022-01-01 RX ADMIN — INSULIN HUMAN 150 UNITS: 500 INJECTION, SOLUTION SUBCUTANEOUS at 17:19

## 2022-01-01 RX ADMIN — BUDESONIDE AND FORMOTEROL FUMARATE DIHYDRATE 2 PUFF: 80; 4.5 AEROSOL RESPIRATORY (INHALATION) at 18:47

## 2022-01-01 RX ADMIN — CLOPIDOGREL 75 MG: 75 TABLET, FILM COATED ORAL at 08:15

## 2022-01-01 RX ADMIN — Medication 10 ML: at 20:18

## 2022-01-01 RX ADMIN — GABAPENTIN 600 MG: 300 CAPSULE ORAL at 21:45

## 2022-01-01 RX ADMIN — SODIUM CHLORIDE 100 ML/HR: 9 INJECTION, SOLUTION INTRAVENOUS at 12:51

## 2022-01-01 RX ADMIN — LAMOTRIGINE 100 MG: 100 TABLET ORAL at 08:21

## 2022-01-01 RX ADMIN — ISOSORBIDE MONONITRATE 120 MG: 60 TABLET ORAL at 08:09

## 2022-01-01 RX ADMIN — LEVETIRACETAM 1000 MG: 500 TABLET, FILM COATED ORAL at 08:03

## 2022-01-01 RX ADMIN — LAMOTRIGINE 100 MG: 100 TABLET ORAL at 08:31

## 2022-01-01 RX ADMIN — METOPROLOL SUCCINATE 200 MG: 50 TABLET, EXTENDED RELEASE ORAL at 08:09

## 2022-01-01 RX ADMIN — ISOSORBIDE MONONITRATE 30 MG: 30 TABLET, EXTENDED RELEASE ORAL at 08:21

## 2022-01-01 RX ADMIN — Medication 10 ML: at 08:33

## 2022-01-01 RX ADMIN — NITROGLYCERIN 1 INCH: 20 OINTMENT TOPICAL at 06:32

## 2022-01-01 RX ADMIN — METOPROLOL SUCCINATE 200 MG: 50 TABLET, EXTENDED RELEASE ORAL at 08:36

## 2022-01-01 RX ADMIN — DEXAMETHASONE SODIUM PHOSPHATE 10 MG: 10 INJECTION, SOLUTION INTRAMUSCULAR; INTRAVENOUS at 17:21

## 2022-01-01 RX ADMIN — PANTOPRAZOLE SODIUM 40 MG: 40 TABLET, DELAYED RELEASE ORAL at 06:14

## 2022-01-01 RX ADMIN — PANTOPRAZOLE SODIUM 40 MG: 40 TABLET, DELAYED RELEASE ORAL at 06:32

## 2022-01-01 RX ADMIN — GABAPENTIN 600 MG: 300 CAPSULE ORAL at 13:56

## 2022-01-01 RX ADMIN — ENOXAPARIN SODIUM 40 MG: 40 INJECTION SUBCUTANEOUS at 15:08

## 2022-01-01 RX ADMIN — OXYCODONE HYDROCHLORIDE AND ACETAMINOPHEN 500 MG: 500 TABLET ORAL at 08:36

## 2022-01-01 RX ADMIN — SPIRONOLACTONE 25 MG: 25 TABLET ORAL at 08:03

## 2022-01-01 RX ADMIN — OXYCODONE HYDROCHLORIDE AND ACETAMINOPHEN 500 MG: 500 TABLET ORAL at 08:31

## 2022-01-01 RX ADMIN — GABAPENTIN 600 MG: 300 CAPSULE ORAL at 06:03

## 2022-01-01 RX ADMIN — GABAPENTIN 600 MG: 300 CAPSULE ORAL at 20:33

## 2022-01-01 RX ADMIN — INSULIN HUMAN 150 UNITS: 500 INJECTION, SOLUTION SUBCUTANEOUS at 08:22

## 2022-01-01 RX ADMIN — Medication 400 UNITS: at 08:03

## 2022-01-01 RX ADMIN — SPIRONOLACTONE 25 MG: 25 TABLET ORAL at 08:20

## 2022-01-01 RX ADMIN — LISINOPRIL 20 MG: 10 TABLET ORAL at 21:10

## 2022-01-01 RX ADMIN — PANTOPRAZOLE SODIUM 40 MG: 40 TABLET, DELAYED RELEASE ORAL at 06:10

## 2022-01-01 RX ADMIN — LEVETIRACETAM 1000 MG: 500 TABLET, FILM COATED ORAL at 21:11

## 2022-01-01 RX ADMIN — SPIRONOLACTONE 25 MG: 25 TABLET ORAL at 08:36

## 2022-01-01 RX ADMIN — DIPHENHYDRAMINE HYDROCHLORIDE 12.5 MG: 50 INJECTION INTRAMUSCULAR; INTRAVENOUS at 09:58

## 2022-01-01 RX ADMIN — ASPIRIN 81 MG: 81 TABLET, COATED ORAL at 08:42

## 2022-01-01 RX ADMIN — INSULIN HUMAN 150 UNITS: 500 INJECTION, SOLUTION SUBCUTANEOUS at 17:35

## 2022-01-01 RX ADMIN — LAMOTRIGINE 50 MG: 100 TABLET ORAL at 21:45

## 2022-01-01 RX ADMIN — INSULIN HUMAN 150 UNITS: 500 INJECTION, SOLUTION SUBCUTANEOUS at 17:10

## 2022-01-01 RX ADMIN — GABAPENTIN 600 MG: 300 CAPSULE ORAL at 13:15

## 2022-01-01 RX ADMIN — Medication 400 UNITS: at 08:14

## 2022-01-01 RX ADMIN — Medication 400 UNITS: at 08:31

## 2022-01-01 RX ADMIN — GABAPENTIN 600 MG: 300 CAPSULE ORAL at 21:00

## 2022-01-01 RX ADMIN — LEVETIRACETAM 1000 MG: 500 TABLET, FILM COATED ORAL at 21:45

## 2022-01-01 RX ADMIN — Medication 10 ML: at 08:16

## 2022-01-01 RX ADMIN — ISOSORBIDE MONONITRATE 30 MG: 30 TABLET, EXTENDED RELEASE ORAL at 21:10

## 2022-01-01 RX ADMIN — LIDOCAINE HYDROCHLORIDE 2 ML: 10 INJECTION, SOLUTION INFILTRATION; PERINEURAL at 11:40

## 2022-01-01 RX ADMIN — TECHNETIUM TC 99M SESTAMIBI 1 DOSE: 1 INJECTION INTRAVENOUS at 12:03

## 2022-01-01 RX ADMIN — GABAPENTIN 600 MG: 300 CAPSULE ORAL at 06:14

## 2022-01-01 RX ADMIN — OXYCODONE HYDROCHLORIDE AND ACETAMINOPHEN 500 MG: 500 TABLET ORAL at 08:03

## 2022-01-01 RX ADMIN — INSULIN HUMAN 150 UNITS: 500 INJECTION, SOLUTION SUBCUTANEOUS at 08:39

## 2022-01-01 RX ADMIN — TECHNETIUM TC 99M SESTAMIBI 1 DOSE: 1 INJECTION INTRAVENOUS at 10:10

## 2022-01-01 RX ADMIN — INSULIN HUMAN 150 UNITS: 500 INJECTION, SOLUTION SUBCUTANEOUS at 10:56

## 2022-01-01 RX ADMIN — LEVETIRACETAM 1000 MG: 500 TABLET, FILM COATED ORAL at 08:20

## 2022-01-01 RX ADMIN — INSULIN HUMAN 150 UNITS: 500 INJECTION, SOLUTION SUBCUTANEOUS at 08:33

## 2022-01-01 RX ADMIN — LIDOCAINE 1 PATCH: 50 PATCH TOPICAL at 16:48

## 2022-01-01 RX ADMIN — BUDESONIDE AND FORMOTEROL FUMARATE DIHYDRATE 2 PUFF: 80; 4.5 AEROSOL RESPIRATORY (INHALATION) at 18:39

## 2022-01-01 RX ADMIN — GABAPENTIN 600 MG: 300 CAPSULE ORAL at 06:10

## 2022-01-01 RX ADMIN — SODIUM CHLORIDE 2190 ML: 9 INJECTION, SOLUTION INTRAVENOUS at 17:20

## 2022-01-01 RX ADMIN — GABAPENTIN 600 MG: 300 CAPSULE ORAL at 21:32

## 2022-01-01 RX ADMIN — METOPROLOL SUCCINATE 200 MG: 50 TABLET, EXTENDED RELEASE ORAL at 08:31

## 2022-01-01 RX ADMIN — BUDESONIDE AND FORMOTEROL FUMARATE DIHYDRATE 2 PUFF: 80; 4.5 AEROSOL RESPIRATORY (INHALATION) at 06:18

## 2022-01-01 RX ADMIN — SODIUM CHLORIDE 100 ML/HR: 9 INJECTION, SOLUTION INTRAVENOUS at 16:26

## 2022-01-01 RX ADMIN — INSULIN HUMAN 150 UNITS: 500 INJECTION, SOLUTION SUBCUTANEOUS at 16:55

## 2022-01-01 RX ADMIN — ENOXAPARIN SODIUM 40 MG: 40 INJECTION SUBCUTANEOUS at 16:26

## 2022-01-01 RX ADMIN — HYDROMORPHONE HYDROCHLORIDE 4 MG: 2 TABLET ORAL at 15:00

## 2022-01-01 RX ADMIN — BUDESONIDE AND FORMOTEROL FUMARATE DIHYDRATE 2 PUFF: 80; 4.5 AEROSOL RESPIRATORY (INHALATION) at 06:53

## 2022-01-01 RX ADMIN — LIDOCAINE HYDROCHLORIDE 2 ML: 10 INJECTION, SOLUTION INFILTRATION; PERINEURAL at 11:39

## 2022-01-01 RX ADMIN — ATORVASTATIN CALCIUM 80 MG: 40 TABLET, FILM COATED ORAL at 08:42

## 2022-01-01 RX ADMIN — LEVETIRACETAM 1000 MG: 500 TABLET, FILM COATED ORAL at 20:58

## 2022-01-01 RX ADMIN — BUDESONIDE AND FORMOTEROL FUMARATE DIHYDRATE 2 PUFF: 80; 4.5 AEROSOL RESPIRATORY (INHALATION) at 06:30

## 2022-01-01 RX ADMIN — ALUMINUM HYDROXIDE, MAGNESIUM HYDROXIDE, AND DIMETHICONE 15 ML: 400; 400; 40 SUSPENSION ORAL at 06:47

## 2022-01-01 RX ADMIN — RANOLAZINE 500 MG: 500 TABLET, FILM COATED, EXTENDED RELEASE ORAL at 08:35

## 2022-01-01 RX ADMIN — LAMOTRIGINE 50 MG: 100 TABLET ORAL at 21:08

## 2022-01-01 RX ADMIN — ASPIRIN 81 MG: 81 TABLET, COATED ORAL at 08:15

## 2022-01-01 RX ADMIN — PHENOBARBITAL, HYOSCYAMINE SULFATE, ATROPINE SULFATE, SCOPOLAMINE HYDROBROMIDE 32.4 MG: 16.2; .1037; .0194; .0065 TABLET ORAL at 06:47

## 2022-01-01 RX ADMIN — ATORVASTATIN CALCIUM 80 MG: 40 TABLET, FILM COATED ORAL at 08:36

## 2022-01-01 RX ADMIN — Medication 5000 UNITS: at 08:15

## 2022-01-01 RX ADMIN — METOPROLOL SUCCINATE 200 MG: 50 TABLET, EXTENDED RELEASE ORAL at 08:41

## 2022-01-01 RX ADMIN — RANOLAZINE 1000 MG: 500 TABLET, FILM COATED, EXTENDED RELEASE ORAL at 08:11

## 2022-01-01 RX ADMIN — SPIRONOLACTONE 25 MG: 25 TABLET ORAL at 08:15

## 2022-01-01 RX ADMIN — ASPIRIN 81 MG: 81 TABLET, COATED ORAL at 08:13

## 2022-01-01 RX ADMIN — PANTOPRAZOLE SODIUM 40 MG: 40 TABLET, DELAYED RELEASE ORAL at 06:03

## 2022-01-01 RX ADMIN — OXYCODONE HYDROCHLORIDE AND ACETAMINOPHEN 500 MG: 500 TABLET ORAL at 08:09

## 2022-01-01 RX ADMIN — ASPIRIN 81 MG: 81 TABLET, COATED ORAL at 08:02

## 2022-01-01 RX ADMIN — ENOXAPARIN SODIUM 40 MG: 40 INJECTION SUBCUTANEOUS at 16:28

## 2022-01-01 RX ADMIN — LISINOPRIL 5 MG: 2.5 TABLET ORAL at 20:32

## 2022-01-01 RX ADMIN — INSULIN HUMAN 150 UNITS: 500 INJECTION, SOLUTION SUBCUTANEOUS at 13:39

## 2022-01-01 RX ADMIN — LISINOPRIL 5 MG: 2.5 TABLET ORAL at 21:11

## 2022-01-01 RX ADMIN — GABAPENTIN 600 MG: 300 CAPSULE ORAL at 14:45

## 2022-01-01 RX ADMIN — Medication 400 UNITS: at 08:11

## 2022-01-01 RX ADMIN — BUDESONIDE AND FORMOTEROL FUMARATE DIHYDRATE 2 PUFF: 80; 4.5 AEROSOL RESPIRATORY (INHALATION) at 19:04

## 2022-01-01 RX ADMIN — GABAPENTIN 600 MG: 300 CAPSULE ORAL at 13:43

## 2022-01-01 RX ADMIN — NITROGLYCERIN 0.4 MG: 0.4 TABLET, ORALLY DISINTEGRATING SUBLINGUAL at 05:41

## 2022-01-01 RX ADMIN — ATORVASTATIN CALCIUM 80 MG: 40 TABLET, FILM COATED ORAL at 08:21

## 2022-01-01 RX ADMIN — SPIRONOLACTONE 25 MG: 25 TABLET ORAL at 08:12

## 2022-01-01 RX ADMIN — ATORVASTATIN CALCIUM 80 MG: 40 TABLET, FILM COATED ORAL at 08:02

## 2022-01-01 RX ADMIN — HYDROMORPHONE HYDROCHLORIDE 0.5 MG: 1 INJECTION, SOLUTION INTRAMUSCULAR; INTRAVENOUS; SUBCUTANEOUS at 12:49

## 2022-01-01 RX ADMIN — NITROGLYCERIN 1 INCH: 20 OINTMENT TOPICAL at 18:40

## 2022-01-01 RX ADMIN — NITROGLYCERIN 1 INCH: 20 OINTMENT TOPICAL at 13:08

## 2022-01-01 RX ADMIN — CETIRIZINE HYDROCHLORIDE 10 MG: 10 TABLET, FILM COATED ORAL at 21:45

## 2022-01-01 RX ADMIN — OXYCODONE HYDROCHLORIDE AND ACETAMINOPHEN 500 MG: 500 TABLET ORAL at 08:41

## 2022-01-01 RX ADMIN — ASPIRIN 81 MG: 81 TABLET, COATED ORAL at 08:36

## 2022-01-01 RX ADMIN — ASPIRIN 81 MG: 81 TABLET, COATED ORAL at 08:10

## 2022-01-01 RX ADMIN — INSULIN HUMAN 150 UNITS: 500 INJECTION, SOLUTION SUBCUTANEOUS at 11:42

## 2022-01-01 RX ADMIN — GABAPENTIN 600 MG: 300 CAPSULE ORAL at 15:33

## 2022-01-01 RX ADMIN — LAMOTRIGINE 100 MG: 100 TABLET ORAL at 08:03

## 2022-01-01 RX ADMIN — Medication 10 ML: at 21:45

## 2022-01-01 RX ADMIN — OXYCODONE HYDROCHLORIDE AND ACETAMINOPHEN 500 MG: 500 TABLET ORAL at 08:20

## 2022-01-01 RX ADMIN — Medication 10 ML: at 20:58

## 2022-01-01 RX ADMIN — Medication 10 ML: at 15:27

## 2022-01-01 RX ADMIN — INSULIN HUMAN 150 UNITS: 500 INJECTION, SOLUTION SUBCUTANEOUS at 07:53

## 2022-01-01 RX ADMIN — LAMOTRIGINE 100 MG: 100 TABLET ORAL at 08:10

## 2022-01-01 RX ADMIN — CLOPIDOGREL 75 MG: 75 TABLET, FILM COATED ORAL at 08:12

## 2022-01-01 RX ADMIN — RANOLAZINE 500 MG: 500 TABLET, FILM COATED, EXTENDED RELEASE ORAL at 20:18

## 2022-01-01 RX ADMIN — TIZANIDINE 8 MG: 4 TABLET ORAL at 23:22

## 2022-01-01 RX ADMIN — Medication 5000 UNITS: at 08:12

## 2022-01-01 RX ADMIN — PROCHLORPERAZINE EDISYLATE 5 MG: 5 INJECTION INTRAMUSCULAR; INTRAVENOUS at 16:48

## 2022-01-01 RX ADMIN — Medication 10 ML: at 20:32

## 2022-01-01 RX ADMIN — LEVETIRACETAM 1000 MG: 500 TABLET, FILM COATED ORAL at 08:12

## 2022-01-01 RX ADMIN — LAMOTRIGINE 100 MG: 100 TABLET ORAL at 08:15

## 2022-01-01 RX ADMIN — PANTOPRAZOLE SODIUM 40 MG: 40 TABLET, DELAYED RELEASE ORAL at 05:44

## 2022-01-01 RX ADMIN — ASPIRIN 81 MG: 81 TABLET, COATED ORAL at 08:31

## 2022-01-01 RX ADMIN — BUDESONIDE AND FORMOTEROL FUMARATE DIHYDRATE 2 PUFF: 80; 4.5 AEROSOL RESPIRATORY (INHALATION) at 19:32

## 2022-01-01 RX ADMIN — Medication 10 ML: at 08:32

## 2022-01-01 RX ADMIN — INSULIN HUMAN 150 UNITS: 500 INJECTION, SOLUTION SUBCUTANEOUS at 19:48

## 2022-01-01 RX ADMIN — INSULIN HUMAN 150 UNITS: 500 INJECTION, SOLUTION SUBCUTANEOUS at 10:54

## 2022-01-01 RX ADMIN — LAMOTRIGINE 50 MG: 100 TABLET ORAL at 20:58

## 2022-01-01 RX ADMIN — Medication 10 ML: at 21:14

## 2022-01-01 RX ADMIN — NITROGLYCERIN 1 INCH: 20 OINTMENT TOPICAL at 15:27

## 2022-01-01 RX ADMIN — INSULIN HUMAN 150 UNITS: 500 INJECTION, SOLUTION SUBCUTANEOUS at 18:28

## 2022-01-01 RX ADMIN — CLOPIDOGREL 75 MG: 75 TABLET, FILM COATED ORAL at 08:09

## 2022-01-01 RX ADMIN — ENOXAPARIN SODIUM 40 MG: 40 INJECTION SUBCUTANEOUS at 15:33

## 2022-01-01 RX ADMIN — LISINOPRIL 20 MG: 10 TABLET ORAL at 21:45

## 2022-01-01 RX ADMIN — GABAPENTIN 600 MG: 300 CAPSULE ORAL at 05:44

## 2022-01-01 RX ADMIN — Medication 5000 UNITS: at 08:20

## 2022-01-01 RX ADMIN — Medication 400 UNITS: at 08:16

## 2022-01-01 RX ADMIN — LEVETIRACETAM 1000 MG: 500 TABLET, FILM COATED ORAL at 21:40

## 2022-01-01 RX ADMIN — CLOPIDOGREL 75 MG: 75 TABLET, FILM COATED ORAL at 08:21

## 2022-01-01 RX ADMIN — LAMOTRIGINE 50 MG: 100 TABLET ORAL at 20:18

## 2022-01-01 RX ADMIN — NITROGLYCERIN 1 INCH: 20 OINTMENT TOPICAL at 13:43

## 2022-01-01 RX ADMIN — RANOLAZINE 1000 MG: 500 TABLET, FILM COATED, EXTENDED RELEASE ORAL at 08:09

## 2022-01-01 RX ADMIN — Medication 5000 UNITS: at 08:35

## 2022-01-01 RX ADMIN — METOPROLOL SUCCINATE 200 MG: 50 TABLET, EXTENDED RELEASE ORAL at 08:03

## 2022-01-01 RX ADMIN — LISINOPRIL 20 MG: 10 TABLET ORAL at 21:40

## 2022-01-01 RX ADMIN — INSULIN HUMAN 150 UNITS: 500 INJECTION, SOLUTION SUBCUTANEOUS at 08:27

## 2022-01-01 RX ADMIN — PANTOPRAZOLE SODIUM 40 MG: 40 TABLET, DELAYED RELEASE ORAL at 06:07

## 2022-01-01 RX ADMIN — ISOSORBIDE MONONITRATE 120 MG: 60 TABLET ORAL at 08:02

## 2022-01-01 RX ADMIN — MAGNESIUM SULFATE HEPTAHYDRATE 1 G: 1 INJECTION, SOLUTION INTRAVENOUS at 18:03

## 2022-01-01 RX ADMIN — CETIRIZINE HYDROCHLORIDE 10 MG: 10 TABLET, FILM COATED ORAL at 20:32

## 2022-01-01 RX ADMIN — ALBUTEROL SULFATE 2 PUFF: 90 AEROSOL, METERED RESPIRATORY (INHALATION) at 17:21

## 2022-01-01 RX ADMIN — LISINOPRIL 20 MG: 10 TABLET ORAL at 20:58

## 2022-01-01 RX ADMIN — BUDESONIDE AND FORMOTEROL FUMARATE DIHYDRATE 2 PUFF: 80; 4.5 AEROSOL RESPIRATORY (INHALATION) at 18:45

## 2022-01-01 RX ADMIN — Medication 5000 UNITS: at 08:09

## 2022-01-01 RX ADMIN — METOPROLOL SUCCINATE 200 MG: 50 TABLET, EXTENDED RELEASE ORAL at 08:13

## 2022-01-01 RX ADMIN — ATORVASTATIN CALCIUM 80 MG: 40 TABLET, FILM COATED ORAL at 08:31

## 2022-01-01 RX ADMIN — NITROGLYCERIN 0.4 MG: 0.4 TABLET, ORALLY DISINTEGRATING SUBLINGUAL at 05:30

## 2022-01-01 RX ADMIN — LAMOTRIGINE 50 MG: 100 TABLET ORAL at 21:10

## 2022-01-01 RX ADMIN — LISINOPRIL 20 MG: 10 TABLET ORAL at 20:18

## 2022-01-01 RX ADMIN — BUDESONIDE AND FORMOTEROL FUMARATE DIHYDRATE 2 PUFF: 80; 4.5 AEROSOL RESPIRATORY (INHALATION) at 18:32

## 2022-01-01 RX ADMIN — Medication 10 ML: at 21:13

## 2022-01-01 RX ADMIN — RANOLAZINE 1000 MG: 500 TABLET, FILM COATED, EXTENDED RELEASE ORAL at 21:40

## 2022-01-01 RX ADMIN — ISOSORBIDE MONONITRATE 60 MG: 60 TABLET ORAL at 08:35

## 2022-01-01 RX ADMIN — LEVETIRACETAM 1000 MG: 500 TABLET, FILM COATED ORAL at 20:32

## 2022-01-01 RX ADMIN — NITROGLYCERIN 1 INCH: 20 OINTMENT TOPICAL at 06:15

## 2022-01-01 RX ADMIN — LISINOPRIL 5 MG: 2.5 TABLET ORAL at 21:08

## 2022-01-03 ENCOUNTER — TELEPHONE (OUTPATIENT)
Dept: SURGERY | Facility: CLINIC | Age: 62
End: 2022-01-03

## 2022-01-03 NOTE — TELEPHONE ENCOUNTER
Please give patient wife a call has question if patient  Needs appointment with Dr Kim after seeing cardiologist.

## 2022-01-04 NOTE — PROGRESS NOTES
Patient: Denzel Harding    YOB: 1960    Date: 01/06/2022    Primary Care Provider: Isaura Godoy MD    Chief complaint supraumbilical pain    SUBJECTIVE:    History of present illness:  I saw the patient in the office today as a follow-up consultation for pre operative cardiac clearance prior to umbilical hernia repair.  Patient was seen recently in the office at which time he complained of a painful, palpable mass located at his umbilicus.  Ultrasound was performed which did show a 1.1 cm umbilical defect containing omentum.  Patient was asked to obtain pulmonary and cardiac clearances prior to surgical repair.    He has had an incisional hernia at this region just above the umbilicus.    The following portions of the patient's history were reviewed and updated as appropriate: allergies, current medications, past family history, past medical history, past social history, past surgical history and problem list.    Review of Systems   Constitutional: Negative for chills, fever and unexpected weight change.   HENT: Negative for trouble swallowing and voice change.    Eyes: Negative for visual disturbance.   Respiratory: Negative for apnea, cough, chest tightness, shortness of breath and wheezing.    Cardiovascular: Negative for chest pain, palpitations and leg swelling.   Gastrointestinal: Negative for abdominal distention, abdominal pain, anal bleeding, blood in stool, constipation, diarrhea, nausea, rectal pain and vomiting.   Endocrine: Negative for cold intolerance and heat intolerance.   Genitourinary: Negative for difficulty urinating, dysuria, flank pain, scrotal swelling and testicular pain.   Musculoskeletal: Negative for back pain, gait problem and joint swelling.   Skin: Negative for color change, rash and wound.   Neurological: Negative for dizziness, syncope, speech difficulty, weakness, numbness and headaches.   Hematological: Negative for adenopathy. Does not bruise/bleed easily.    Psychiatric/Behavioral: Negative for confusion. The patient is not nervous/anxious.        History:  Past Medical History:   Diagnosis Date   • Anxiety    • Arthritis    • Asthma    • Brachial neuritis 01/19/2017   • Cellulitis     left arm and right leg    • Chest pain last 11/25/21    pt denies any since date of 11/25/21   • CHF (congestive heart failure) ([facility])     Patient reported history May 2020    • COPD (chronic obstructive pulmonary disease) ([facility])    • Coronary artery disease     Patient reported CABG , 3 vessel   • COVID-19 vaccine series completed     Pfizer, plus booster   • Depression    • Diabetes mellitus ([facility])     diagnosed in 1998, checks fsbg 3-4x/day   • Dizziness    • Edema    • GERD (gastroesophageal reflux disease)    • H/O chest x-ray 07/04/2015    Mild Left base atelectasis   • H/O echocardiogram 07/05/2015    Normal LVSF. EF of 60-65%. Grade 1 diastolic dysfunction of the LV myocardium. No evidence of pericardial effusion   • H/O exercise stress test    • Hearing loss     No use of hearing aids   • History of fracture     Reported 2 bones in left foot and a finger   • History of herniated intervertebral disc     History of left L5-S1 disc herniation   • History of nuclear stress test 2021    x2   • History of pneumonia    • History of pneumonia    • Hyperlipidemia    • Hypertension    • Impaired functional mobility, balance, gait, and endurance    • LOM (loss of memory) 1/19/2017   • Lower back pain     Right   • Measles    • MUSE (nonalcoholic steatohepatitis)    • Neuropathy     feet    • EDD treated with BiPAP     BiPAP HS - instructed to bring mask/machine DOS (setting 13 and 5)   • Oxygen dependent     2L NC   • Palpitations    • Pericardial effusion    • Poor dentition     Patient reported missing multiple teeth   • Renal disorder    • Seasonal allergies    • Seizure disorder ([facility])     focal seizures   • SOB (shortness of breath)     no report of any new onset or worsening of  symptoms   • Staph infection     back after sugery at the incision site, on Rside face    • Stroke ([facility])     x5 most recent 03/2021, slight weakness in hands occasionaly    • Wears glasses     reading glasses       Past Surgical History:   Procedure Laterality Date   • BACK SURGERY     • CARDIAC CATHETERIZATION     • CARDIAC CATHETERIZATION N/A 8/11/2017    Procedure: Coronary angiography;  Surgeon: Dallas Hernandez MD;  Location: [facility] CATH INVASIVE LOCATION;  Service:    • CARDIAC CATHETERIZATION N/A 8/11/2017    Procedure: Left Heart Cath;  Surgeon: Dallas Hernandez MD;  Location: [facility] CATH INVASIVE LOCATION;  Service:    • CARDIAC CATHETERIZATION N/A 8/11/2017    Procedure: Left ventriculography;  Surgeon: Dallas Hernandez MD;  Location: [facility] CATH INVASIVE LOCATION;  Service:    • CARDIAC CATHETERIZATION      2002 x1 stent,  x1 stent   • CARDIOVASCULAR STRESS TEST  07/03/2017    WITH DR HERNANDEZ AT [facility]   • CARPAL TUNNEL RELEASE Right    • CATARACT EXTRACTION W/ INTRAOCULAR LENS IMPLANT Right 6/12/2017    Procedure: CATARACT PHACO EXTRACTION WITH INTRAOCULAR LENS IMPLANT RIGHT ;  Surgeon: Natalie Cao MD;  Location: [facility] OR;  Service:    • CATARACT EXTRACTION W/ INTRAOCULAR LENS IMPLANT Left 7/10/2017    Procedure: CATARACT PHACO EXTRACTION WITH INTRAOCULAR LENS IMPLANT LEFT;  Surgeon: Natalie Cao MD;  Location: [facility] OR;  Service:    • CHOLECYSTECTOMY     • COLONOSCOPY     • COLONOSCOPY N/A 7/2/2020    Procedure: COLONOSCOPY;  Surgeon: Petra Crowell MD;  Location: [facility] ENDOSCOPY;  Service: Gastroenterology;  Laterality: N/A;  poor prep   • CORONARY ANGIOPLASTY WITH STENT PLACEMENT      X1-LAD 2002, Coronary 2019 (x2 total)   • CORONARY ARTERY BYPASS GRAFT N/A 8/15/2017    Procedure: CORONARY ARTERY BYPASS GRAFTING x 3 UTILIZING THE LEFT INTERNAL MAMMARY ARTERY WITH ENDOSCOPIC VEIN HARVESTING OF THE RIGHT GREATER SAPHENOUS VEIN, HAMLET, LAD ENDARECTOMY;  Surgeon: London Damon MD;   Location:  [facility] OR;  Service:    • ENDOSCOPY     • EYE CAPSULOTOMY WITH LASER Right 11/25/2019    Procedure: EYE CAPSULOTOMY WITH LASER RIGHT;  Surgeon: Natalie Cao MD;  Location:  [facility] OR;  Service: Ophthalmology   • EYE CAPSULOTOMY WITH LASER Left 12/9/2019    Procedure: EYE CAPSULOTOMY WITH LASER LEFT;  Surgeon: Natalie Cao MD;  Location:  [facility] OR;  Service: Ophthalmology   • EYE SURGERY      cataract surgery both eyes   • INTERVENTIONAL RADIOLOGY PROCEDURE Bilateral 12/31/2019    Procedure: Carotid Cerebral Angiogram;  Surgeon: Damian Dubois MD;  Location:  [facility] CATH INVASIVE LOCATION;  Service: Interventional Radiology   • KNEE ARTHROSCOPY Bilateral    • KNEE ARTHROSCOPY Right 2010    Dr Lewis   • KNEE ARTHROSCOPY Left 2008    Dr Jameson   • KNEE MENISCECTOMY Right 10/15/2020    Procedure: Right knee arthroscopy with partial medial and lateral meniscectomy and three compartment chondroplasty.;  Surgeon: South Lewis MD;  Location:  [facility] OR;  Service: Orthopedics;  Laterality: Right;   • MOUTH SURGERY      complete extraction    • NEUROPLASTY / TRANSPOSITION ULNAR NERVE AT ELBOW Left    • OTHER SURGICAL HISTORY      Foraminotomy and discectomy   • PERICARDIAL WINDOW N/A 8/25/2017    Procedure: PERICARDIAL WINDOW;  Surgeon: Freeman Phillips MD;  Location:  [facility] OR;  Service:    • VENTRAL/INCISIONAL HERNIA REPAIR N/A 1/12/2022    Procedure: INCISIONAL HERNIA REPAIR  WITH MESH;  Surgeon: Torres Kim MD;  Location:  [facility] OR;  Service: General;  Laterality: N/A;       Family History   Problem Relation Age of Onset   • Hypertension Mother    • Arthritis Mother    • Stomach cancer Mother    • Alcohol abuse Father    • Heart disease Other    • Hypertension Other    • Other Other         Neurologic disorder   • Heart attack Other    • Parkinsonism Other    • Stroke Other    • Heart disease Other    • Hypertension Other    • Heart disease Other    • Stroke Other    • Hypertension Other    • Colon  cancer Neg Hx        Social History     Tobacco Use   • Smoking status: Former Smoker     Packs/day: 1.00     Years: 10.00     Pack years: 10.00     Types: Cigarettes     Quit date: 1998     Years since quittin.0   • Smokeless tobacco: Former User     Types: Snuff     Quit date:    Vaping Use   • Vaping Use: Never used   Substance Use Topics   • Alcohol use: Yes     Comment: 3 drinks a year   • Drug use: No       Allergies:  Allergies   Allergen Reactions   • Ajovy [Fremanezumab-Vfrm] Other (See Comments)     Caused tightness of chest, vomiting, pain in both arms.   • Sulfa Antibiotics Anaphylaxis, Nausea And Vomiting and Delirium   • Invokana [Canagliflozin] Unknown - Low Severity     DKA   • Codeine Nausea And Vomiting     Can only take with Phenergan   • Morphine Unknown - Low Severity     Does not work. It causes pain       Medications:    Current Outpatient Medications:   •  albuterol sulfate HFA (Ventolin HFA) 108 (90 Base) MCG/ACT inhaler, Inhale 2 puffs Every 4 (Four) Hours As Needed for Wheezing or Shortness of Air., Disp: 8 g, Rfl: 5  •  amitriptyline (ELAVIL) 25 MG tablet, TAKE 3 TABLETS BY MOUTH EVERY NIGHT AT BEDTIME, Disp: 270 tablet, Rfl: 0  •  aspirin  MG tablet, Take 1 tablet by mouth Daily. (Patient taking differently: Take 325 mg by mouth Daily. PT not taking due to surgery.), Disp: 30 tablet, Rfl: 0  •  atorvastatin (LIPITOR) 80 MG tablet, Take 1 tablet by mouth Every Night., Disp: 90 tablet, Rfl: 1  •  azelastine (ASTELIN) 0.1 % nasal spray, 1 spray into the nostril(s) as directed by provider 2 (Two) Times a Day As Needed for Rhinitis or Allergies. Use in each nostril as directed, Disp: 1 each, Rfl: 5  •  B-D ULTRAFINE III SHORT PEN 31G X 8 MM misc, 2 (Two) Times a Day., Disp: , Rfl:   •  budesonide-formoterol (Symbicort) 80-4.5 MCG/ACT inhaler, Inhale 2 puffs 2 (Two) Times a Day. Rinse mouth with water after use., Disp: 1 each, Rfl: 3  •  ciprofloxacin (CILOXAN) 0.3 %  ophthalmic solution, INSTILL 5 DROPS INTO LEFT EAR TWICE DAILY FOR 7 DAYS (Patient not taking: Reported on 1/11/2022), Disp: , Rfl:   •  clopidogrel (PLAVIX) 75 MG tablet, Take 1 tablet by mouth Daily. (Patient taking differently: Take 75 mg by mouth Daily. PT not taking due to surgery.), Disp: 90 tablet, Rfl: 1  •  coenzyme Q10 100 MG capsule, Take 100 mg by mouth Daily., Disp: , Rfl:   •  Continuous Blood Gluc Sensor (FreeStyle Radha 2 Sensor) misc, , Disp: , Rfl:   •  docusate sodium (Colace) 100 MG capsule, Take 1 capsule by mouth 2 (Two) Times a Day As Needed for Constipation., Disp: 60 capsule, Rfl: 3  •  Dupixent 300 MG/2ML solution prefilled syringe, INJECT 300 MG SUBCUTANEOUSLY EVERY OTHER WEEK, Disp: 4 each, Rfl: 4  •  furosemide (LASIX) 40 MG tablet, Take 1 tablet by mouth Daily As Needed (edema)., Disp: 30 tablet, Rfl: 5  •  gabapentin (NEURONTIN) 600 MG tablet, Take 1 tablet by mouth 3 (Three) Times a Day., Disp: 270 tablet, Rfl: 3  •  HumuLIN R U-500 KwikPen 500 UNIT/ML solution pen-injector CONCENTRATED injection, Inject 190 Units under the skin into the appropriate area as directed Every Morning. Takes around 9AM, Disp: , Rfl:   •  HYDROcodone-acetaminophen (NORCO) 7.5-325 MG per tablet, Take 1 tablet by mouth Every 6 (Six) Hours As Needed for Moderate Pain., Disp: 15 tablet, Rfl: 0  •  Insulin Regular Human, Conc, (HumuLIN R U-500 KwikPen) 500 UNIT/ML solution pen-injector CONCENTRATED injection, Inject  under the skin into the appropriate area as directed 3 (Three) Times a Day Before Meals. Take 180 units in am, 180 units in afternoon and evening., Disp: , Rfl:   •  Insulin Regular Human, Conc, (HumuLIN R U-500 KwikPen) 500 UNIT/ML solution pen-injector CONCENTRATED injection, Inject 180 Units under the skin into the appropriate area as directed 2 (Two) Times a Day. At lunch and dinner, Disp: , Rfl:   •  ipratropium-albuterol (DUO-NEB) 0.5-2.5 mg/3 ml nebulizer, Take 3 mL by nebulization 4 (Four)  Times a Day As Needed for Wheezing or Shortness of Air., Disp: 360 mL, Rfl: 5  •  isosorbide mononitrate (IMDUR) 60 MG 24 hr tablet, Take 1 tablet by mouth Daily, Disp: 90 tablet, Rfl: 3  •  ketorolac (TORADOL) 10 MG tablet, Take 1 tablet by mouth Every 6 (Six) Hours As Needed for Moderate Pain ., Disp: 12 tablet, Rfl: 0  •  lamoTRIgine (LaMICtal) 100 MG tablet, Take 100 mg by mouth 2 (Two) Times a Day., Disp: , Rfl:   •  Lancets (OneTouch Delica Plus [id number]) misc, , Disp: , Rfl:   •  levETIRAcetam (KEPPRA) 1000 MG tablet, Take 1,000 mg by mouth 2 (Two) Times a Day., Disp: , Rfl:   •  levocetirizine (XYZAL) 5 MG tablet, Take 1 tablet by mouth Every Evening., Disp: 90 tablet, Rfl: 3  •  lisinopril (PRINIVIL,ZESTRIL) 5 MG tablet, Take 1 tablet by mouth Daily., Disp: 90 tablet, Rfl: 1  •  meclizine (ANTIVERT) 25 MG tablet, Take 1 tablet by mouth 3 (Three) Times a Day As Needed for dizziness., Disp: 10 tablet, Rfl: 0  •  metoprolol succinate XL (TOPROL-XL) 200 MG 24 hr tablet, Take 1 tablet by mouth Daily, Disp: 90 tablet, Rfl: 2  •  mupirocin (Bactroban) 2 % cream, Apply 1 application topically to the appropriate area as directed 3 (Three) Times a Day., Disp: 15 g, Rfl: 2  •  Nebulizer device, 1 Device Take As Directed., Disp: 1 each, Rfl: 0  •  nitroglycerin (NITROLINGUAL) 0.4 MG/SPRAY spray, Place 1 spray under the tongue Every 5 (Five) Minutes As Needed for Chest Pain., Disp: 4.9 g, Rfl: 0  •  nortriptyline (PAMELOR) 25 MG capsule, Take 25 mg by mouth., Disp: , Rfl:   •  nortriptyline (PAMELOR) 50 MG capsule, Take 50 mg by mouth Every Night., Disp: , Rfl:   •  omeprazole (priLOSEC) 20 MG capsule, Take 1 capsule by mouth Daily. (Patient taking differently: Take 20 mg by mouth Every Night.), Disp: 90 capsule, Rfl: 0  •  OneTouch Verio test strip, , Disp: , Rfl:   •  potassium chloride (K-DUR,KLOR-CON) 20 MEQ CR tablet, Take 1 tablet by mouth Daily As Needed (take with lasix)., Disp: 30 tablet, Rfl: 5  •  predniSONE  (DELTASONE) 20 MG tablet, Take 2 tablets daily for 5 days. This should be started 2 days prior to umbilical hernia surgery and continued 5 days in a row. (Patient taking differently: Take 20 mg by mouth 2 (Two) Times a Day. Take 2 tablets daily for 5 days. This should be started 2 days prior to umbilical hernia surgery and continued 5 days in a row.), Disp: 10 tablet, Rfl: 0  •  promethazine (PHENERGAN) 12.5 MG tablet, Take 1 tablet by mouth Every 6 (Six) Hours As Needed for Nausea or Vomiting. (Patient taking differently: Take 25 mg by mouth Every 6 (Six) Hours As Needed for Nausea or Vomiting.), Disp: 12 tablet, Rfl: 0  •  Semaglutide, 1 MG/DOSE, (Ozempic, 1 MG/DOSE,) 2 MG/1.5ML solution pen-injector, Inject 1 mg under the skin into the appropriate area as directed 1 (One) Time Per Week., Disp: 9 mL, Rfl: 3  •  spironolactone (ALDACTONE) 25 MG tablet, Take 1 tablet by mouth Daily., Disp: 90 tablet, Rfl: 1  •  Tiotropium Bromide Monohydrate (Spiriva Respimat) 1.25 MCG/ACT aerosol solution inhaler, Inhale 2 puffs Daily., Disp: 4 g, Rfl: 5  •  traMADol (Ultram) 50 MG tablet, Take 1 tablet by mouth 2 (Two) Times a Day As Needed for Moderate Pain ., Disp: 20 tablet, Rfl: 0  •  TURMERIC PO, Take 500 mg by mouth 2 (Two) Times a Day., Disp: , Rfl:   •  vitamin C (ASCORBIC ACID) 500 MG tablet, Take 500 mg by mouth Daily., Disp: , Rfl:   •  vitamin D3 125 MCG (5000 UT) capsule capsule, Take 5,000 Units by mouth Daily., Disp: , Rfl:   •  vitamin E 400 UNIT capsule, Take 1 capsule by mouth 2 (two) times a day., Disp: 60 capsule, Rfl: 5  •  Vortioxetine HBr (Trintellix) 20 MG tablet, Take 20 mg by mouth Daily., Disp: 90 tablet, Rfl: 0  •  zafirlukast (Accolate) 20 MG tablet, Take 1 tablet by mouth 2 (Two) Times a Day., Disp: 60 tablet, Rfl: 5    OBJECTIVE:    Vital Signs:   There were no vitals filed for this visit.    Physical Exam:   General Appearance:    Alert, cooperative, in no acute distress   Head:    Normocephalic,  without obvious abnormality, atraumatic   Eyes:            Lids and lashes normal, conjunctivae and sclerae normal, no   icterus, no pallor, corneas clear, PERRLA   Ears:    Ears appear intact with no abnormalities noted   Throat:   No oral lesions, no thrush, oral mucosa moist   Neck:   No adenopathy, supple, trachea midline, no thyromegaly, no   carotid bruit, no JVD   Lungs:     Clear to auscultation,respirations regular, even and                  unlabored    Heart:    Regular rhythm and normal rate, normal S1 and S2, no            murmur   Abdomen:     no masses, no organomegaly, soft non-tender, non-distended, no guarding, there is evidence of a partially reducible incisional hernia superior to the umbilicus   Extremities:   Moves all extremities well, no edema, no cyanosis, no             redness   Pulses:   Pulses palpable and equal bilaterally   Skin:   No bleeding, bruising or rash   Lymph nodes:   No palpable adenopathy   Neurologic:   Cranial nerves 2 - 12 grossly intact, sensation intact        Results Review:   I reviewed the patient's new clinical results.  I reviewed the patient's new imaging results and agree with the interpretation.  I reviewed the patient's other test results and agree with the interpretation    Review of Systems was reviewed and confirmed as accurate as documented by the MA.    ASSESSMENT/PLAN:    1. Incisional hernia with obstruction but no gangrene        I had a detailed and extensive discussion with the patient in the office and they understand that they need to undergo hernia repair with mesh.  Full risks and benefits of operative versus nonoperative intervention were discussed with the patient and these included things such as nonresolution of symptoms and possible worsening of symptoms without surgical intervention versus infection, bleeding, possible recurrent hernia, possible postoperative neuralgia from nerve damage or involvement with scar tissue, etc.  The patient  understands, agrees, and had no questions for me at the end of the office visit.     I discussed the patients findings and my recommendations with patient.    Electronically signed by Torres Kim MD  01/18/22

## 2022-01-05 ENCOUNTER — OFFICE VISIT (OUTPATIENT)
Dept: CARDIOLOGY | Facility: CLINIC | Age: 62
End: 2022-01-05

## 2022-01-05 VITALS
DIASTOLIC BLOOD PRESSURE: 60 MMHG | HEART RATE: 79 BPM | HEIGHT: 70 IN | BODY MASS INDEX: 43.95 KG/M2 | SYSTOLIC BLOOD PRESSURE: 132 MMHG | WEIGHT: 307 LBS | OXYGEN SATURATION: 97 %

## 2022-01-05 DIAGNOSIS — I10 ESSENTIAL HYPERTENSION: ICD-10-CM

## 2022-01-05 DIAGNOSIS — E78.00 HYPERCHOLESTEROLEMIA: ICD-10-CM

## 2022-01-05 DIAGNOSIS — I25.10 CORONARY ARTERIOSCLEROSIS IN NATIVE ARTERY: ICD-10-CM

## 2022-01-05 DIAGNOSIS — I50.32 CHRONIC DIASTOLIC CHF (CONGESTIVE HEART FAILURE): Primary | ICD-10-CM

## 2022-01-05 PROBLEM — G62.9 NEUROPATHY: Status: ACTIVE | Noted: 2021-07-29

## 2022-01-05 PROBLEM — H35.00 RETINOPATHY: Status: ACTIVE | Noted: 2021-07-29

## 2022-01-05 PROCEDURE — 99214 OFFICE O/P EST MOD 30 MIN: CPT | Performed by: INTERNAL MEDICINE

## 2022-01-05 PROCEDURE — 93000 ELECTROCARDIOGRAM COMPLETE: CPT | Performed by: INTERNAL MEDICINE

## 2022-01-05 RX ORDER — POTASSIUM CHLORIDE 20 MEQ/1
20 TABLET, EXTENDED RELEASE ORAL DAILY PRN
Qty: 30 TABLET | Refills: 5 | Status: SHIPPED | OUTPATIENT
Start: 2022-01-05 | End: 2022-01-01 | Stop reason: SDUPTHER

## 2022-01-05 RX ORDER — FUROSEMIDE 40 MG/1
40 TABLET ORAL DAILY
COMMUNITY
Start: 2021-12-17 | End: 2022-01-05 | Stop reason: SDUPTHER

## 2022-01-05 RX ORDER — FUROSEMIDE 40 MG/1
40 TABLET ORAL DAILY PRN
Qty: 30 TABLET | Refills: 5 | Status: SHIPPED | OUTPATIENT
Start: 2022-01-05 | End: 2022-01-01 | Stop reason: SDUPTHER

## 2022-01-05 NOTE — PROGRESS NOTES
[facility] at [facility]  Office visit  Denzel Harding  1960  [phone]    VISIT DATE:  1/5/2022      PCP: Isaura Godoy MD  [address] [place] [id number]    CC:  Chief Complaint   Patient presents with   • surgery clearance       PROBLEM LIST:  1. Post op pericarditis with effusion s/p left anterior thoracotomy and pericardial window 8/25/17  2. CABG x 3, LAD endarterectomy 8/15/17 Dr. Damon, Normal EF, DHF  3. HTN: Controlled, BB and Norvasc  4. CVA: Remote  5. DM  6. COPD/EDD  7. HLD    Previous cardiac studies and procedures:  August 2017 cardiac catheterization  · Severe three-vessel coronary artery disease  · Preserved global and regional left ventricular systolic function  · Elevated left ventricular filling pressures consistent with diastolic heart failure.  · No significant left-sided valvular abnormalities appreciated    December 2017  Echo  · Left ventricular systolic function is normal.  · Left ventricular diastolic dysfunction (grade I a) consistent with impaired relaxation.  · Left ventricular wall thickness is consistent with mild concentric hypertrophy.  Carotid duplex  · Proximal right internal carotid artery plaque without significant stenosis.  · Right internal carotid artery stenosis of 0-49%.  · Proximal left internal carotid artery plaque without significant stenosis.  · Left internal carotid artery stenosis of 0-49%.    December 2018 myocardial perfusion imaging  · Left ventricular ejection fraction is borderline normal (Calculated EF = 50%).  · Myocardial perfusion imaging indicates a normal myocardial perfusion study with no evidence of ischemia.    6/2019 echo  Technically very limited study   1) Mild LVH with normal LV systolic function ( EF is over 55%)  2) Mild left atrial enlargement   3) Trace MR and TR   4) Normal RV function     January 2021  Transthoracic echo  1.  Technically difficult study.  2.  Normal left ventricular size and systolic  function, LVEF grossly 55-60%.  3.  Normal LV diastolic filling pattern.  4.  Normal right ventricular size and systolic function.  5.  Severely increased left atrial volume index.  6.  Trace mitral regurgitation.  Kayy scan myocardial perfusion imaging  · Left ventricular ejection fraction is normal. (Calculated EF = 57%).  · Myocardial perfusion imaging indicates a normal myocardial perfusion study with no evidence of ischemia.    March 2021  TTE  1.  Technically difficult study.   2.  Normal left ventricular size and systolic function, LVEF 55-60%.  3.  Indeterminate LV diastolic filling pattern.  4.  Normal right ventricular size and systolic function.  5.  Moderately increased left atrial volume index.  6.  Mild calcification of the aortic valve without significant stenosis.  7.  Mild concentric LVH.  HAMLET  · Estimated left ventricular EF = 60% Left ventricular ejection fraction appears to be 56 - 60%. Left ventricular systolic function is normal.  · Saline test results are negative.  · The right atrial cavity is small in size.  · There is moderate calcification of the aortic valve mainly affecting the non-coronary cusp(s).      ASSESSMENT:   Diagnosis Plan   1. Chronic diastolic CHF (congestive heart failure) (HCC)     2. Coronary arteriosclerosis in native artery     3. Essential hypertension     4. Hypercholesterolemia         PLAN:  Perioperative cardiovascular risk assessment: Scheduled for intermediate risk procedure, umbilical hernia repair.  Currently stable and compensated from a cardiac standpoint.  Acceptable risk for major perioperative cardiovascular complications.  No further cardiac testing or medication titration indicated prior to procedure.  May hold aspirin 7 days prior to procedure, may hold clopidogrel 5 days prior to procedure.    Coronary artery disease: Stable and asymptomatic.  Continue current medical therapy.    History of postop pericarditis status post pericardial  "window    Hypertension: goal less than 130/80.  Potential intermittent symptomatic relative hypotension.  Instructed move his lisinopril to bedtime dosing, otherwise continue current medical therapy.    Hyperlipidemia: Continue high intensity statin therapy, goal LDL less than 70.    Congestive heart failure, chronic, diastolic: Currently euvolemic and compensated.  Continue current medical therapy.  Starting Lasix 40 mg as needed for weight gain 3 pounds above baseline.      Subjective  Using wheeled walker for ambulation.  Reports stable shortness of breath in a class II pattern which is chronic in nature.  Denies PND orthopnea.  Intermittently requiring Lasix as needed for edema and weight gain.  As long as he is taking his Imdur consistently is not experiencing any chest discomfort.  No sustained palpitations.  Blood pressures running less than 130/80 mmHg.  Currently compliant with medical therapy.    PHYSICAL EXAMINATION:  Vitals:    01/05/22 1534   BP: 132/60   BP Location: Right arm   Patient Position: Sitting   Pulse: 79   SpO2: 97%   Weight: (!) 139 kg (307 lb)   Height: 177.8 cm (70\")     General Appearance:    Alert, cooperative, no distress, appears stated age   Head:    Normocephalic, without obvious abnormality, atraumatic   Eyes:    conjunctiva/corneas clear   Nose:   Nares normal, septum midline, mucosa normal, no drainage   Throat:   Lips, teeth and gums normal   Neck:   Supple, symmetrical, trachea midline, no carotid    bruit or JVD   Lungs:    Diminished at the bases bilaterally, respirations unlabored   Chest Wall:    No tenderness or deformity    Heart:    Regular rate and rhythm, S1 and S2 normal, no murmur, rub   or gallop, normal carotid impulse bilaterally without bruit.   Abdomen:     Soft, non-tender   Extremities:   Extremities normal, atraumatic, no cyanosis, trivial bilateral pretibial edema   Pulses:   2+ and symmetric all extremities   Skin:   Skin color, texture, turgor normal, no " rashes or lesions       Diagnostic Data:    ECG 12 Lead    Date/Time: 1/5/2022 3:48 PM  Performed by: London Jiménez III, MD  Authorized by: London Jiménez III, MD   Comparison: compared with previous ECG from 11/25/2021  Similar to previous ECG  Rhythm: sinus rhythm    Clinical impression: normal ECG          Lab Results   Component Value Date    CHLPL 123 07/01/2021    TRIG 163 (H) 07/01/2021    HDL 27 (L) 07/01/2021     Lab Results   Component Value Date    GLUCOSE 125 (H) 12/10/2021    BUN 8 12/10/2021    CREATININE 0.65 (L) 12/10/2021     12/10/2021    K 4.1 12/10/2021     12/10/2021    CO2 27.2 12/10/2021     Lab Results   Component Value Date    HGBA1C 9.90 (H) 08/10/2021     Lab Results   Component Value Date    WBC 7.27 12/10/2021    HGB 13.5 12/10/2021    HCT 41.3 12/10/2021     12/10/2021       Allergies  Allergies   Allergen Reactions   • Ajovy [Fremanezumab-Vfrm] Other (See Comments)     Caused tightness of chest, vomiting, pain in both arms.   • Sulfa Antibiotics Anaphylaxis, Nausea And Vomiting and Delirium   • Invokana [Canagliflozin] Unknown - Low Severity     DKA   • Codeine Nausea And Vomiting     Can only take with Phenergan   • Morphine Unknown - Low Severity     Does not work. It causes pain       Current Medications    Current Outpatient Medications:   •  albuterol sulfate HFA (Ventolin HFA) 108 (90 Base) MCG/ACT inhaler, Inhale 2 puffs Every 4 (Four) Hours As Needed for Wheezing or Shortness of Air., Disp: 8 g, Rfl: 5  •  amitriptyline (ELAVIL) 25 MG tablet, TAKE 3 TABLETS BY MOUTH EVERY NIGHT AT BEDTIME, Disp: 270 tablet, Rfl: 0  •  aspirin  MG tablet, Take 1 tablet by mouth Daily. (Patient taking differently: Take 325 mg by mouth Daily. PT not taking due to surgery.), Disp: 30 tablet, Rfl: 0  •  atorvastatin (LIPITOR) 80 MG tablet, Take 1 tablet by mouth Every Night., Disp: 90 tablet, Rfl: 1  •  azelastine (ASTELIN) 0.1 % nasal spray, 1 spray into the nostril(s) as  directed by provider 2 (Two) Times a Day As Needed for Rhinitis or Allergies. Use in each nostril as directed, Disp: 1 each, Rfl: 5  •  B-D ULTRAFINE III SHORT PEN 31G X 8 MM misc, 2 (Two) Times a Day., Disp: , Rfl:   •  budesonide-formoterol (Symbicort) 80-4.5 MCG/ACT inhaler, Inhale 2 puffs 2 (Two) Times a Day. Rinse mouth with water after use., Disp: 1 each, Rfl: 3  •  ciprofloxacin (CILOXAN) 0.3 % ophthalmic solution, INSTILL 5 DROPS INTO LEFT EAR TWICE DAILY FOR 7 DAYS, Disp: , Rfl:   •  clopidogrel (PLAVIX) 75 MG tablet, Take 1 tablet by mouth Daily. (Patient taking differently: Take 75 mg by mouth Daily. PT not taking due to surgery.), Disp: 90 tablet, Rfl: 1  •  coenzyme Q10 100 MG capsule, Take 100 mg by mouth Daily., Disp: , Rfl:   •  Continuous Blood Gluc Sensor (FreeStyle Radha 2 Sensor) misc, , Disp: , Rfl:   •  docusate sodium (Colace) 100 MG capsule, Take 1 capsule by mouth 2 (Two) Times a Day As Needed for Constipation., Disp: 60 capsule, Rfl: 3  •  Dupixent 300 MG/2ML solution prefilled syringe, INJECT 300 MG SUBCUTANEOUSLY EVERY OTHER WEEK (Patient taking differently: Inject 300 mg under the skin into the appropriate area as directed Take As Directed. Every other week), Disp: 4 each, Rfl: 4  •  furosemide (LASIX) 40 MG tablet, Take 40 mg by mouth Daily., Disp: , Rfl:   •  gabapentin (NEURONTIN) 600 MG tablet, Take 1 tablet by mouth 3 (Three) Times a Day., Disp: 270 tablet, Rfl: 3  •  HumuLIN R U-500 KwikPen 500 UNIT/ML solution pen-injector CONCENTRATED injection, Inject 190 Units under the skin into the appropriate area as directed Every Morning. Takes around 9AM, Disp: , Rfl:   •  Insulin Regular Human, Conc, (HumuLIN R U-500 KwikPen) 500 UNIT/ML solution pen-injector CONCENTRATED injection, Inject  under the skin into the appropriate area as directed 3 (Three) Times a Day Before Meals. Take 180 units in am, 180 units in afternoon and evening., Disp: , Rfl:   •  Insulin Regular Human, Conc,  (HumuLIN R U-500 KwikPen) 500 UNIT/ML solution pen-injector CONCENTRATED injection, Inject 180 Units under the skin into the appropriate area as directed Every Night. Patient takes at 8PM, Disp: , Rfl:   •  ipratropium-albuterol (DUO-NEB) 0.5-2.5 mg/3 ml nebulizer, Take 3 mL by nebulization 4 (Four) Times a Day As Needed for Wheezing or Shortness of Air., Disp: 360 mL, Rfl: 5  •  ketorolac (TORADOL) 10 MG tablet, Take 1 tablet by mouth Every 6 (Six) Hours As Needed for Moderate Pain ., Disp: 12 tablet, Rfl: 0  •  lamoTRIgine (LaMICtal) 100 MG tablet, Take 100 mg by mouth 2 (Two) Times a Day., Disp: , Rfl:   •  Lancets (OneTouch Delica Plus [id number]) misc, , Disp: , Rfl:   •  levETIRAcetam (KEPPRA) 1000 MG tablet, Take 1,000 mg by mouth 2 (Two) Times a Day., Disp: , Rfl:   •  levocetirizine (XYZAL) 5 MG tablet, Take 1 tablet by mouth Every Evening., Disp: 90 tablet, Rfl: 3  •  lisinopril (PRINIVIL,ZESTRIL) 5 MG tablet, Take 1 tablet by mouth Daily., Disp: 90 tablet, Rfl: 1  •  meclizine (ANTIVERT) 25 MG tablet, Take 1 tablet by mouth 3 (Three) Times a Day As Needed for dizziness., Disp: 10 tablet, Rfl: 0  •  metoprolol succinate XL (TOPROL-XL) 200 MG 24 hr tablet, Take 1 tablet by mouth Daily, Disp: 90 tablet, Rfl: 2  •  mupirocin (Bactroban) 2 % cream, Apply 1 application topically to the appropriate area as directed 3 (Three) Times a Day., Disp: 15 g, Rfl: 2  •  Nebulizer device, 1 Device Take As Directed., Disp: 1 each, Rfl: 0  •  nitroglycerin (NITROLINGUAL) 0.4 MG/SPRAY spray, Place 1 spray under the tongue Every 5 (Five) Minutes As Needed for Chest Pain., Disp: 4.9 g, Rfl: 0  •  nortriptyline (PAMELOR) 25 MG capsule, Take 25 mg by mouth., Disp: , Rfl:   •  nortriptyline (PAMELOR) 50 MG capsule, Take 50 mg by mouth Every Night., Disp: , Rfl:   •  omeprazole (priLOSEC) 20 MG capsule, Take 1 capsule by mouth Daily. (Patient taking differently: Take 20 mg by mouth Every Night.), Disp: 90 capsule, Rfl: 0  •   OneTouch Verio test strip, , Disp: , Rfl:   •  predniSONE (DELTASONE) 20 MG tablet, Take 2 tablets daily for 5 days. This should be started 2 days prior to umbilical hernia surgery and continued 5 days in a row. (Patient taking differently: Take 20 mg by mouth 2 (Two) Times a Day. Take 2 tablets daily for 5 days. This should be started 2 days prior to umbilical hernia surgery and continued 5 days in a row.), Disp: 10 tablet, Rfl: 0  •  promethazine (PHENERGAN) 12.5 MG tablet, Take 1 tablet by mouth Every 6 (Six) Hours As Needed for Nausea or Vomiting. (Patient taking differently: Take 25 mg by mouth Every 6 (Six) Hours As Needed for Nausea or Vomiting.), Disp: 12 tablet, Rfl: 0  •  Semaglutide, 1 MG/DOSE, (Ozempic, 1 MG/DOSE,) 2 MG/1.5ML solution pen-injector, Inject 1 mg under the skin into the appropriate area as directed 1 (One) Time Per Week., Disp: 9 mL, Rfl: 3  •  spironolactone (ALDACTONE) 25 MG tablet, Take 1 tablet by mouth Daily., Disp: 90 tablet, Rfl: 1  •  Tiotropium Bromide Monohydrate (Spiriva Respimat) 1.25 MCG/ACT aerosol solution inhaler, Inhale 2 puffs Daily., Disp: 4 g, Rfl: 5  •  traMADol (Ultram) 50 MG tablet, Take 1 tablet by mouth 2 (Two) Times a Day As Needed for Moderate Pain ., Disp: 20 tablet, Rfl: 0  •  TURMERIC PO, Take 500 mg by mouth 2 (Two) Times a Day., Disp: , Rfl:   •  vitamin C (ASCORBIC ACID) 500 MG tablet, Take 500 mg by mouth Daily., Disp: , Rfl:   •  vitamin D3 125 MCG (5000 UT) capsule capsule, Take 5,000 Units by mouth Daily., Disp: , Rfl:   •  vitamin E 400 UNIT capsule, Take 1 capsule by mouth 2 (two) times a day., Disp: 60 capsule, Rfl: 5  •  Vortioxetine HBr (Trintellix) 20 MG tablet, Take 20 mg by mouth Daily., Disp: 90 tablet, Rfl: 0  •  zafirlukast (Accolate) 20 MG tablet, Take 1 tablet by mouth 2 (Two) Times a Day., Disp: 60 tablet, Rfl: 5  •  isosorbide mononitrate (IMDUR) 60 MG 24 hr tablet, Take 1 tablet by mouth Daily, Disp: 90 tablet, Rfl: 3          ROS  Review  of Systems   Cardiovascular: Positive for dyspnea on exertion, irregular heartbeat and leg swelling. Negative for chest pain and palpitations.   Respiratory: Positive for shortness of breath, sleep disturbances due to breathing, snoring, sputum production and wheezing. Negative for cough.    Neurological: Positive for dizziness.       SOCIAL HX  Social History     Socioeconomic History   • Marital status:    • Number of children: 1   Tobacco Use   • Smoking status: Former Smoker     Packs/day: 1.00     Years: 10.00     Pack years: 10.00     Types: Cigarettes     Quit date: 1998     Years since quittin.0   • Smokeless tobacco: Former User     Types: Snuff     Quit date:    Vaping Use   • Vaping Use: Never used   Substance and Sexual Activity   • Alcohol use: Yes     Comment: 3 drinks a year   • Drug use: No   • Sexual activity: Defer       FAMILY HX  Family History   Problem Relation Age of Onset   • Hypertension Mother    • Arthritis Mother    • Stomach cancer Mother    • Alcohol abuse Father    • Heart disease Other    • Hypertension Other    • Other Other         Neurologic disorder   • Heart attack Other    • Parkinsonism Other    • Stroke Other    • Heart disease Other    • Hypertension Other    • Heart disease Other    • Stroke Other    • Hypertension Other    • Colon cancer Neg Hx              London Jiménez III, MD, FACC

## 2022-01-06 ENCOUNTER — OFFICE VISIT (OUTPATIENT)
Dept: SURGERY | Facility: CLINIC | Age: 62
End: 2022-01-06

## 2022-01-06 DIAGNOSIS — K43.0 INCISIONAL HERNIA WITH OBSTRUCTION BUT NO GANGRENE: Primary | ICD-10-CM

## 2022-01-06 PROCEDURE — 99213 OFFICE O/P EST LOW 20 MIN: CPT | Performed by: SURGERY

## 2022-01-10 ENCOUNTER — TELEPHONE (OUTPATIENT)
Dept: SURGERY | Facility: CLINIC | Age: 62
End: 2022-01-10

## 2022-01-10 NOTE — TELEPHONE ENCOUNTER
Per Dr. Kim I called pt to schedule umbilical hernia repair @  01/12/2022 vs 01/13/2022.  I left message on voice mail asking pt to return my call.

## 2022-01-11 ENCOUNTER — LAB (OUTPATIENT)
Dept: LAB | Facility: HOSPITAL | Age: 62
End: 2022-01-11

## 2022-01-11 DIAGNOSIS — Z01.818 PRE-OP TESTING: Primary | ICD-10-CM

## 2022-01-11 LAB — SARS-COV-2 RNA PNL SPEC NAA+PROBE: NOT DETECTED

## 2022-01-11 PROCEDURE — 87635 SARS-COV-2 COVID-19 AMP PRB: CPT | Performed by: SURGERY

## 2022-01-11 PROCEDURE — C9803 HOPD COVID-19 SPEC COLLECT: HCPCS | Performed by: SURGERY

## 2022-01-11 NOTE — PRE-PROCEDURE INSTRUCTIONS
PAT phone history completed with pt for upcoming procedure on 1/12/22 with Dr. Kim.     PAT PASS GIVEN/REVIEWED WITH PT.  VERBALIZED UNDERSTANDING OF THE FOLLOWING:  DO NOT EAT, DRINK, SMOKE, USE SMOKELESS TOBACCO OR CHEW GUM AFTER MIDNIGHT THE NIGHT BEFORE SURGERY.  THIS ALSO INCLUDES HARD CANDIES AND MINTS.    DO NOT SHAVE THE AREA TO BE OPERATED ON AT LEAST 48 HOURS PRIOR TO THE PROCEDURE.  DO NOT WEAR MAKE UP OR NAIL POLISH.  DO NOT LEAVE IN ANY PIERCING OR WEAR JEWELRY THE DAY OF SURGERY.      DO NOT USE ADHESIVES IF YOU WEAR DENTURES.    DO NOT WEAR EYE CONTACTS; BRING IN YOUR GLASSES.    ONLY TAKE MEDICATION THE MORNING OF YOUR PROCEDURE IF INSTRUCTED BY YOUR SURGEON WITH ENOUGH WATER TO SWALLOW THE MEDICATION.  IF YOUR SURGEON DID NOT SPECIFY WHICH MEDICATIONS TO TAKE, YOU WILL NEED TO CALL THEIR OFFICE FOR FURTHER INSTRUCTIONS AND DO AS THEY INSTRUCT.    LEAVE ANYTHING YOU CONSIDER VALUABLE AT HOME.    YOU WILL NEED TO ARRANGE FOR SOMEONE TO DRIVE YOU HOME AFTER SURGERY.  IT IS RECOMMENDED THAT YOU DO NOT DRIVE, WORK, DRINK ALCOHOL OR MAKE MAJOR DECISIONS FOR AT LEAST 24 HOURS AFTER YOUR PROCEDURE IS COMPLETE.      THE DAY OF YOUR PROCEDURE, BRING IN THE FOLLOWING IF APPLICABLE:   PICTURE ID AND INSURANCE/MEDICARE OR MEDICAID CARDS/ANY CO-PAY THAT MAY BE DUE   COPY OF ADVANCED DIRECTIVE/LIVING WILL/POWER OR    CPAP/BIPAP/INHALERS   SKIN PREP SHEET   YOUR PREADMISSION TESTING PASS (IF NOT A PHONE HISTORY)           COVID self-quarantine instructions reviewed with the pt.  Verbalized understanding.

## 2022-01-12 ENCOUNTER — HOSPITAL ENCOUNTER (OUTPATIENT)
Facility: HOSPITAL | Age: 62
Setting detail: HOSPITAL OUTPATIENT SURGERY
Discharge: HOME | End: 2022-01-12
Attending: SURGERY | Admitting: SURGERY

## 2022-01-12 ENCOUNTER — ANESTHESIA EVENT (OUTPATIENT)
Dept: PERIOP | Facility: HOSPITAL | Age: 62
End: 2022-01-12

## 2022-01-12 ENCOUNTER — ANESTHESIA (OUTPATIENT)
Dept: PERIOP | Facility: HOSPITAL | Age: 62
End: 2022-01-12

## 2022-01-12 VITALS
RESPIRATION RATE: 22 BRPM | HEIGHT: 70 IN | WEIGHT: 309 LBS | HEART RATE: 90 BPM | TEMPERATURE: 97.8 F | SYSTOLIC BLOOD PRESSURE: 114 MMHG | DIASTOLIC BLOOD PRESSURE: 88 MMHG | OXYGEN SATURATION: 94 % | BODY MASS INDEX: 44.24 KG/M2

## 2022-01-12 DIAGNOSIS — E66.01 MORBID OBESITY WITH BMI OF 40.0-44.9, ADULT: ICD-10-CM

## 2022-01-12 DIAGNOSIS — K43.0 INCISIONAL HERNIA WITH OBSTRUCTION BUT NO GANGRENE: ICD-10-CM

## 2022-01-12 DIAGNOSIS — J45.50 SEVERE PERSISTENT ASTHMA WITHOUT COMPLICATION: ICD-10-CM

## 2022-01-12 DIAGNOSIS — R10.9 ABDOMINAL DISCOMFORT: Primary | ICD-10-CM

## 2022-01-12 DIAGNOSIS — R06.02 SHORTNESS OF BREATH: ICD-10-CM

## 2022-01-12 LAB — GLUCOSE BLDC GLUCOMTR-MCNC: 76 MG/DL (ref 70–130)

## 2022-01-12 PROCEDURE — 25010000002 SUCCINYLCHOLINE PER 20 MG: Performed by: NURSE ANESTHETIST, CERTIFIED REGISTERED

## 2022-01-12 PROCEDURE — 25010000002 DEXAMETHASONE PER 1 MG: Performed by: NURSE ANESTHETIST, CERTIFIED REGISTERED

## 2022-01-12 PROCEDURE — 25010000002 METHYLPREDNISOLONE PER 125 MG: Performed by: NURSE ANESTHETIST, CERTIFIED REGISTERED

## 2022-01-12 PROCEDURE — 25010000002 ONDANSETRON PER 1 MG: Performed by: NURSE ANESTHETIST, CERTIFIED REGISTERED

## 2022-01-12 PROCEDURE — C1781 MESH (IMPLANTABLE): HCPCS | Performed by: SURGERY

## 2022-01-12 PROCEDURE — 25010000002 KETOROLAC TROMETHAMINE PER 15 MG: Performed by: NURSE ANESTHETIST, CERTIFIED REGISTERED

## 2022-01-12 PROCEDURE — 49568 PR IMPLANT MESH HERNIA REPAIR/DEBRIDEMENT CLOSURE: CPT | Performed by: SURGERY

## 2022-01-12 PROCEDURE — 49561 PR REPAIR INCISIONAL HERNIA,STRANG: CPT | Performed by: SURGERY

## 2022-01-12 PROCEDURE — 25010000002 HYDROMORPHONE PER 4 MG: Performed by: NURSE ANESTHETIST, CERTIFIED REGISTERED

## 2022-01-12 PROCEDURE — 94640 AIRWAY INHALATION TREATMENT: CPT

## 2022-01-12 PROCEDURE — 94799 UNLISTED PULMONARY SVC/PX: CPT

## 2022-01-12 PROCEDURE — 82962 GLUCOSE BLOOD TEST: CPT

## 2022-01-12 PROCEDURE — 88302 TISSUE EXAM BY PATHOLOGIST: CPT | Performed by: SURGERY

## 2022-01-12 PROCEDURE — 25010000002 PROPOFOL 200 MG/20ML EMULSION: Performed by: NURSE ANESTHETIST, CERTIFIED REGISTERED

## 2022-01-12 DEVICE — VENTRALEX ST HERNIA PATCH
Type: IMPLANTABLE DEVICE | Site: ABDOMEN | Status: FUNCTIONAL
Brand: VENTRALEX ST HERNIA PATCH

## 2022-01-12 RX ORDER — IBUPROFEN 600 MG/1
600 TABLET ORAL EVERY 6 HOURS PRN
Status: DISCONTINUED | OUTPATIENT
Start: 2022-01-12 | End: 2022-01-12 | Stop reason: HOSPADM

## 2022-01-12 RX ORDER — ONDANSETRON 2 MG/ML
4 INJECTION INTRAMUSCULAR; INTRAVENOUS ONCE AS NEEDED
Status: DISCONTINUED | OUTPATIENT
Start: 2022-01-12 | End: 2022-01-12 | Stop reason: HOSPADM

## 2022-01-12 RX ORDER — DEXAMETHASONE SODIUM PHOSPHATE 4 MG/ML
INJECTION, SOLUTION INTRA-ARTICULAR; INTRALESIONAL; INTRAMUSCULAR; INTRAVENOUS; SOFT TISSUE AS NEEDED
Status: DISCONTINUED | OUTPATIENT
Start: 2022-01-12 | End: 2022-01-12 | Stop reason: SURG

## 2022-01-12 RX ORDER — PROPOFOL 10 MG/ML
INJECTION, EMULSION INTRAVENOUS AS NEEDED
Status: DISCONTINUED | OUTPATIENT
Start: 2022-01-12 | End: 2022-01-12 | Stop reason: SURG

## 2022-01-12 RX ORDER — ONDANSETRON 4 MG/1
4 TABLET, FILM COATED ORAL ONCE AS NEEDED
Status: DISCONTINUED | OUTPATIENT
Start: 2022-01-12 | End: 2022-01-12 | Stop reason: HOSPADM

## 2022-01-12 RX ORDER — BUPIVACAINE HYDROCHLORIDE 5 MG/ML
INJECTION, SOLUTION EPIDURAL; INTRACAUDAL AS NEEDED
Status: DISCONTINUED | OUTPATIENT
Start: 2022-01-12 | End: 2022-01-12 | Stop reason: HOSPADM

## 2022-01-12 RX ORDER — LIDOCAINE HYDROCHLORIDE 20 MG/ML
INJECTION, SOLUTION INTRAVENOUS AS NEEDED
Status: DISCONTINUED | OUTPATIENT
Start: 2022-01-12 | End: 2022-01-12 | Stop reason: SURG

## 2022-01-12 RX ORDER — HYDROCODONE BITARTRATE AND ACETAMINOPHEN 7.5; 325 MG/1; MG/1
1 TABLET ORAL EVERY 6 HOURS PRN
Qty: 15 TABLET | Refills: 0 | Status: SHIPPED | OUTPATIENT
Start: 2022-01-12 | End: 2022-01-26

## 2022-01-12 RX ORDER — IPRATROPIUM BROMIDE AND ALBUTEROL SULFATE 2.5; .5 MG/3ML; MG/3ML
3 SOLUTION RESPIRATORY (INHALATION) ONCE
Status: COMPLETED | OUTPATIENT
Start: 2022-01-12 | End: 2022-01-12

## 2022-01-12 RX ORDER — SODIUM CHLORIDE 0.9 % (FLUSH) 0.9 %
10 SYRINGE (ML) INJECTION AS NEEDED
Status: DISCONTINUED | OUTPATIENT
Start: 2022-01-12 | End: 2022-01-12 | Stop reason: HOSPADM

## 2022-01-12 RX ORDER — ONDANSETRON 2 MG/ML
INJECTION INTRAMUSCULAR; INTRAVENOUS AS NEEDED
Status: DISCONTINUED | OUTPATIENT
Start: 2022-01-12 | End: 2022-01-12 | Stop reason: SURG

## 2022-01-12 RX ORDER — DUPILUMAB 300 MG/2ML
INJECTION, SOLUTION SUBCUTANEOUS
Qty: 4 EACH | Refills: 4 | Status: ON HOLD | OUTPATIENT
Start: 2022-01-12 | End: 2022-01-01

## 2022-01-12 RX ORDER — METHYLPREDNISOLONE SODIUM SUCCINATE 125 MG/2ML
125 INJECTION, POWDER, LYOPHILIZED, FOR SOLUTION INTRAMUSCULAR; INTRAVENOUS ONCE
Status: COMPLETED | OUTPATIENT
Start: 2022-01-12 | End: 2022-01-12

## 2022-01-12 RX ORDER — MORPHINE SULFATE 2 MG/ML
2 INJECTION, SOLUTION INTRAMUSCULAR; INTRAVENOUS
Status: DISCONTINUED | OUTPATIENT
Start: 2022-01-12 | End: 2022-01-12 | Stop reason: HOSPADM

## 2022-01-12 RX ORDER — MAGNESIUM HYDROXIDE 1200 MG/15ML
LIQUID ORAL AS NEEDED
Status: DISCONTINUED | OUTPATIENT
Start: 2022-01-12 | End: 2022-01-12 | Stop reason: HOSPADM

## 2022-01-12 RX ORDER — NEOSTIGMINE METHYLSULFATE 5 MG/5 ML
SYRINGE (ML) INTRAVENOUS AS NEEDED
Status: DISCONTINUED | OUTPATIENT
Start: 2022-01-12 | End: 2022-01-12 | Stop reason: SURG

## 2022-01-12 RX ORDER — CLINDAMYCIN PHOSPHATE 900 MG/50ML
900 INJECTION, SOLUTION INTRAVENOUS ONCE
Status: COMPLETED | OUTPATIENT
Start: 2022-01-12 | End: 2022-01-12

## 2022-01-12 RX ORDER — MEPERIDINE HYDROCHLORIDE 25 MG/ML
12.5 INJECTION INTRAMUSCULAR; INTRAVENOUS; SUBCUTANEOUS
Status: DISCONTINUED | OUTPATIENT
Start: 2022-01-12 | End: 2022-01-12 | Stop reason: HOSPADM

## 2022-01-12 RX ORDER — KETOROLAC TROMETHAMINE 30 MG/ML
15 INJECTION, SOLUTION INTRAMUSCULAR; INTRAVENOUS ONCE
Status: COMPLETED | OUTPATIENT
Start: 2022-01-12 | End: 2022-01-12

## 2022-01-12 RX ORDER — SUCCINYLCHOLINE CHLORIDE 20 MG/ML
INJECTION INTRAMUSCULAR; INTRAVENOUS AS NEEDED
Status: DISCONTINUED | OUTPATIENT
Start: 2022-01-12 | End: 2022-01-12 | Stop reason: SURG

## 2022-01-12 RX ORDER — SODIUM CHLORIDE, SODIUM LACTATE, POTASSIUM CHLORIDE, CALCIUM CHLORIDE 600; 310; 30; 20 MG/100ML; MG/100ML; MG/100ML; MG/100ML
1000 INJECTION, SOLUTION INTRAVENOUS CONTINUOUS
Status: DISCONTINUED | OUTPATIENT
Start: 2022-01-12 | End: 2022-01-12 | Stop reason: HOSPADM

## 2022-01-12 RX ORDER — HYDROMORPHONE HCL 110MG/55ML
PATIENT CONTROLLED ANALGESIA SYRINGE INTRAVENOUS AS NEEDED
Status: DISCONTINUED | OUTPATIENT
Start: 2022-01-12 | End: 2022-01-12 | Stop reason: SURG

## 2022-01-12 RX ORDER — PROMETHAZINE HYDROCHLORIDE 25 MG/1
25 TABLET ORAL EVERY 6 HOURS PRN
Qty: 20 TABLET | Refills: 0 | Status: SHIPPED | OUTPATIENT
Start: 2022-01-12 | End: 2022-01-17

## 2022-01-12 RX ORDER — IPRATROPIUM BROMIDE AND ALBUTEROL SULFATE 2.5; .5 MG/3ML; MG/3ML
3 SOLUTION RESPIRATORY (INHALATION) ONCE
Status: CANCELLED | OUTPATIENT
Start: 2022-01-12

## 2022-01-12 RX ORDER — IPRATROPIUM BROMIDE AND ALBUTEROL SULFATE 2.5; .5 MG/3ML; MG/3ML
3 SOLUTION RESPIRATORY (INHALATION) ONCE AS NEEDED
Status: COMPLETED | OUTPATIENT
Start: 2022-01-12 | End: 2022-01-12

## 2022-01-12 RX ORDER — MEPERIDINE HYDROCHLORIDE 25 MG/ML
25 INJECTION INTRAMUSCULAR; INTRAVENOUS; SUBCUTANEOUS ONCE AS NEEDED
Status: DISCONTINUED | OUTPATIENT
Start: 2022-01-12 | End: 2022-01-12 | Stop reason: HOSPADM

## 2022-01-12 RX ADMIN — IPRATROPIUM BROMIDE AND ALBUTEROL SULFATE 3 ML: .5; 3 SOLUTION RESPIRATORY (INHALATION) at 13:35

## 2022-01-12 RX ADMIN — IPRATROPIUM BROMIDE AND ALBUTEROL SULFATE 3 ML: .5; 3 SOLUTION RESPIRATORY (INHALATION) at 09:33

## 2022-01-12 RX ADMIN — DEXAMETHASONE SODIUM PHOSPHATE 4 MG: 4 INJECTION, SOLUTION INTRAMUSCULAR; INTRAVENOUS at 08:19

## 2022-01-12 RX ADMIN — SODIUM CHLORIDE, POTASSIUM CHLORIDE, SODIUM LACTATE AND CALCIUM CHLORIDE 1000 ML: 600; 310; 30; 20 INJECTION, SOLUTION INTRAVENOUS at 07:25

## 2022-01-12 RX ADMIN — LIDOCAINE HYDROCHLORIDE 60 MG: 20 INJECTION, SOLUTION INTRAVENOUS at 08:19

## 2022-01-12 RX ADMIN — PROPOFOL 200 MG: 10 INJECTION, EMULSION INTRAVENOUS at 08:19

## 2022-01-12 RX ADMIN — Medication 5 MG: at 08:40

## 2022-01-12 RX ADMIN — CLINDAMYCIN PHOSPHATE 900 MG: 900 INJECTION, SOLUTION INTRAVENOUS at 08:19

## 2022-01-12 RX ADMIN — IPRATROPIUM BROMIDE AND ALBUTEROL SULFATE 3 ML: .5; 2.5 SOLUTION RESPIRATORY (INHALATION) at 10:58

## 2022-01-12 RX ADMIN — ONDANSETRON 4 MG: 2 INJECTION INTRAMUSCULAR; INTRAVENOUS at 08:19

## 2022-01-12 RX ADMIN — SUCCINYLCHOLINE CHLORIDE 100 MG: 20 INJECTION, SOLUTION INTRAMUSCULAR; INTRAVENOUS at 08:19

## 2022-01-12 RX ADMIN — METHYLPREDNISOLONE SODIUM SUCCINATE 125 MG: 125 INJECTION, POWDER, FOR SOLUTION INTRAMUSCULAR; INTRAVENOUS at 10:08

## 2022-01-12 RX ADMIN — HYDROMORPHONE HYDROCHLORIDE 2 MG: 2 INJECTION, SOLUTION INTRAMUSCULAR; INTRAVENOUS; SUBCUTANEOUS at 08:19

## 2022-01-12 RX ADMIN — SUGAMMADEX 200 MG: 100 INJECTION, SOLUTION INTRAVENOUS at 08:45

## 2022-01-12 RX ADMIN — KETOROLAC TROMETHAMINE 15 MG: 30 INJECTION, SOLUTION INTRAMUSCULAR; INTRAVENOUS at 09:33

## 2022-01-12 RX ADMIN — GLYCOPYRROLATE 0.4 MG: 0.2 INJECTION, SOLUTION INTRAMUSCULAR; INTRAVENOUS at 08:40

## 2022-01-12 NOTE — DISCHARGE INSTRUCTIONS
Please follow all post op instructions and follow up appointment time from your physician's office included in your discharge packet.    Rest today    No pushing, pulling, tugging,  heavy lifting, or strenuous activity.  No major decision making, driving, or drinking alcoholic beverages for 24 hours. ( due to the medications you have  received)  Always use good hand hygiene/washing techniques.  NO driving while taking pain medications.    * if you have an incision:  Check your incision area every day for signs of infection.   Check for:  * more redness, swelling, or pain  *more fluid or blood  *warmth  *pus or bad smell    To assist you in voiding:  Drink plenty of fluids  Listen to running water while attempting to void.    If you are unable to urinate and you have an uncomfortable urge to void or it has been   6 hours since you were discharged, return to the Emergency Room.    Use your ice pack as instructed, do not use continuously.    Follow your physicians instructions as previously directed.

## 2022-01-12 NOTE — ANESTHESIA PROCEDURE NOTES
Airway  Urgency: elective    Date/Time: 1/12/2022 8:19 AM  Airway not difficult    General Information and Staff    Patient location during procedure: OR  CRNA: Tl Nevarez CRNA    Indications and Patient Condition  Indications for airway management: airway protection    Preoxygenated: yes  Mask difficulty assessment: 1 - vent by mask    Final Airway Details  Final airway type: endotracheal airway      Successful airway: ETT  Cuffed: yes   Successful intubation technique: direct laryngoscopy  Endotracheal tube insertion site: oral  Blade: Gi  Blade size: 4  ETT size (mm): 7.5  Cormack-Lehane Classification: grade I - full view of glottis  Placement verified by: chest auscultation and capnometry   Measured from: lips  ETT/EBT  to lips (cm): 21  Number of attempts at approach: 1  Assessment: lips, teeth, and gum same as pre-op and atraumatic intubation

## 2022-01-12 NOTE — PROGRESS NOTES
Pt complains about difficulty breathing. Duoneb X 2 ordered without improvement, IV steroids also ordered and Toradol for pain control. Dr. Barajas notified and examined patient at bedside. Another duoneb ordered and patient to stay in SDS until 3:00 on CPAP. Will assess prior to discharge. Pt oxygen saturation stable throughout, lungs sounds are course that clear with cough. No sputum noted or changes on EKG.

## 2022-01-12 NOTE — ANESTHESIA PREPROCEDURE EVALUATION
Anesthesia Evaluation     Patient summary reviewed and Nursing notes reviewed   no history of anesthetic complications:  NPO Solid Status: > 8 hours  NPO Liquid Status: > 8 hours           Airway   Mallampati: II  TM distance: >3 FB  Neck ROM: full  no difficulty expected  Dental - normal exam     Pulmonary - normal exam   (+) pneumonia resolved , COPD, asthma,shortness of breath, sleep apnea,   Cardiovascular - normal exam    (+) hypertension, CAD, CABG >6 Months, cardiac stents angina, CHF , pericardial effusion, HUTCHINS, hyperlipidemia,       Neuro/Psych  (+) seizures, CVA, headaches, dizziness/light headedness, numbness, psychiatric history,     GI/Hepatic/Renal/Endo    (+) obesity, morbid obesity, GERD,  hepatitis, liver disease, renal disease, diabetes mellitus,     Musculoskeletal     Abdominal    Substance History - negative use     OB/GYN negative ob/gyn ROS         Other   arthritis,                      Anesthesia Plan    ASA 4     general     intravenous induction     Anesthetic plan, all risks, benefits, and alternatives have been provided, discussed and informed consent has been obtained with: patient.

## 2022-01-12 NOTE — OP NOTE
PATIENT:    Denzel Harding    DATE OF SURGERY:   1/12/2022    PHYSICIAN:    Torres Kim MD    REFERRING PHYSICIAN:  Isaura Godoy MD    YOB: 1960    PREOPERATIVE DIAGNOSIS:  Incarcerated incisional hernia from previous cholecystectomy    POSTOPERATIVE DIAGNOSIS:  Incarcerated incisional hernia from previous cholecystectomy    PROCEDURE:  Incisional herniorraphy with reduction of incarceration       Placement of large round dual sided Ventrio Bard mesh    HISTORY:  The patient presents to me for evaluation and treatment of a history significant for a history significant for incisional hernia.  They are here now today for elective repair.    ANESTHESIA:  General endotracheal.    OPERATIVE PROCEDURE:  The patient was taken to the operating room, placed in the supine position, and given general endotracheal anesthesia per the Anesthesia service.  The patient was prepped and draped in the normal sterile fashion and the patient was given preoperative IV antibiotics.  An appropriate timeout per the nursing staff was performed prior to the incision.  I did meet with the patient preoperatively and marked them accordingly.    An incision was made on the anterior abdominal wall over the area of marking superior to the umbilicus, this was sharply dissected down to the hernia sac itself which was bluntly dissected free.  This was located superior to the umbilicus in the area of previous laparoscopic cholecystectomy incision.  We were able to enter the hernia sac without difficulty, there was evidence of incarceration noted and this was reduced without difficulty after I had to resect incarcerated omentum with Bovie electrocautery for hemostasis. I was able to bluntly dissect some chronic attachments to the anterior abdominal wall around the hernia sac and felt I had excellent exposure performed.    I then placed a large round piece of Ventrio dual sided Marlex mesh and sutured it in place accordingly and a  multi-quadrant fashion with 0 Prolene suture. I felt I had excellent hernia repair and the defect was clearly covered.    Closure was then attended to with a 3-0 Vicryl suture on the wound and then the skin was closed with interrupted 4-0 nylon vertical mattress sutures.  Pressure type dressing was applied. Hemostasis was previously intact.    The patient was stable at this point in time and subsequently transferred back to the recovery room in stable condition.    Torres Kim MD

## 2022-01-12 NOTE — ANESTHESIA POSTPROCEDURE EVALUATION
Patient: Denzel Harding    Procedure Summary     Date: 01/12/22 Room / Location: [facility] OR  /  [facility]    Anesthesia Start: 0811 Anesthesia Stop: 0856    Procedure: INCISIONAL HERNIA REPAIR  WITH MESH (N/A Abdomen) Diagnosis:       Incisional hernia with obstruction but no gangrene      (Incisional hernia with obstruction but no gangrene [K43.0])    Surgeons: Torres Kim MD Provider: Tl Nevarez CRNA    Anesthesia Type: general ASA Status: 4          Anesthesia Type: general    Vitals  Vitals Value Taken Time   BP 95/56 01/12/22 1109   Temp 97.5 °F (36.4 °C) 01/12/22 1100   Pulse 81 01/12/22 1116   Resp 16 01/12/22 1100   SpO2 99 % 01/12/22 1116   Vitals shown include unvalidated device data.        Post Anesthesia Care and Evaluation    Patient location during evaluation: PACU  Patient participation: complete - patient participated  Level of consciousness: awake  Pain score: 3  Pain management: adequate  Airway patency: patent  Anesthetic complications: No anesthetic complications  PONV Status: controlled  Cardiovascular status: acceptable and stable  Respiratory status: acceptable and face mask  Hydration status: acceptable

## 2022-01-15 LAB
LAB AP CASE REPORT: NORMAL
PATH REPORT.FINAL DX SPEC: NORMAL

## 2022-01-24 ENCOUNTER — OFFICE VISIT (OUTPATIENT)
Dept: PULMONOLOGY | Facility: CLINIC | Age: 62
End: 2022-01-24

## 2022-01-24 VITALS
HEART RATE: 82 BPM | SYSTOLIC BLOOD PRESSURE: 128 MMHG | HEIGHT: 70 IN | BODY MASS INDEX: 43.09 KG/M2 | RESPIRATION RATE: 18 BRPM | WEIGHT: 301 LBS | OXYGEN SATURATION: 97 % | DIASTOLIC BLOOD PRESSURE: 64 MMHG

## 2022-01-24 DIAGNOSIS — J45.50 SEVERE PERSISTENT ASTHMA WITHOUT COMPLICATION: ICD-10-CM

## 2022-01-24 DIAGNOSIS — J30.89 OTHER ALLERGIC RHINITIS: ICD-10-CM

## 2022-01-24 DIAGNOSIS — G47.33 OSA (OBSTRUCTIVE SLEEP APNEA): ICD-10-CM

## 2022-01-24 DIAGNOSIS — E66.01 MORBID OBESITY, UNSPECIFIED OBESITY TYPE: ICD-10-CM

## 2022-01-24 DIAGNOSIS — R06.02 SHORTNESS OF BREATH: Primary | ICD-10-CM

## 2022-01-24 PROCEDURE — 99214 OFFICE O/P EST MOD 30 MIN: CPT | Performed by: INTERNAL MEDICINE

## 2022-01-24 RX ORDER — UBROGEPANT 50 MG/1
TABLET ORAL
COMMUNITY
Start: 2022-01-23 | End: 2022-01-01

## 2022-01-24 RX ORDER — BUDESONIDE AND FORMOTEROL FUMARATE DIHYDRATE 80; 4.5 UG/1; UG/1
2 AEROSOL RESPIRATORY (INHALATION)
Qty: 1 EACH | Refills: 11 | Status: SHIPPED | OUTPATIENT
Start: 2022-01-24 | End: 2022-01-01 | Stop reason: SDUPTHER

## 2022-01-24 RX ORDER — TIOTROPIUM BROMIDE INHALATION SPRAY 1.56 UG/1
2 SPRAY, METERED RESPIRATORY (INHALATION) DAILY
Qty: 4 G | Refills: 11 | Status: SHIPPED | OUTPATIENT
Start: 2022-01-24 | End: 2022-01-01 | Stop reason: SDUPTHER

## 2022-01-24 NOTE — PROGRESS NOTES
"  Chief Complaint   Patient presents with   • Follow-up   • Shortness of Breath       Subjective   Denzel Harding is a 61 y.o. male.     History of Present Illness   Patient was evaluated today for follow up of asthma, EDD and shortness of breath. Patient does not report any recent exacerbations requiring emergency room visits or hospitalizations.    Patient is compliant with pulmonary medicines, as prescribed. he is currently on Symbicort and Spiriva. he is using the rescue inhalers minimally.     He is doing Dupixent every 2 weeks and actually feels that it does work.     He actually had ventral hernia repair 2 weeks ago or so.     Patient says that he has been using his nasal sprays on a regular basis and describes no significant ongoing issues other than occasional congestion.     Patient doesn't report any issues with the PAP device. The patient describes no significant issues with his mask either.     Patient says that the compliance with the use of the equipment is good.     Patient says that his symptoms of fatigue & daytime sleepiness have been helped greatly with the use of PAP, as prescribed.       The following portions of the patient's history were reviewed and updated as appropriate: allergies, current medications, past family history, past medical history, past social history and past surgical history.    Review of Systems   HENT: Negative for sinus pain.    Respiratory: Positive for cough and shortness of breath.        Objective   Visit Vitals  /64   Pulse 82   Resp 18   Ht 177.8 cm (70\")   Wt (!) 137 kg (301 lb)   SpO2 97% Comment: on room air   BMI 43.19 kg/m²       Physical Exam  Vitals reviewed.   Constitutional:       Appearance: He is well-developed.   Neck:      Thyroid: No thyromegaly.      Vascular: No JVD.   Cardiovascular:      Rate and Rhythm: Normal rate.      Comments: CABG scar noted.   Pulmonary:      Effort: No respiratory distress.      Breath sounds: No wheezing. "   Musculoskeletal:         General: Normal range of motion.      Cervical back: Normal range of motion.      Comments: Used a wheelchair.    Skin:     General: Skin is warm and dry.   Neurological:      Mental Status: He is alert and oriented to person, place, and time.   Psychiatric:         Behavior: Behavior normal.         Assessment/Plan   Diagnoses and all orders for this visit:    1. Shortness of breath (Primary)    2. Severe persistent asthma without complication    3. EDD (obstructive sleep apnea)    4. Morbid obesity, unspecified obesity type (HCC)    5. Other allergic rhinitis    Other orders  -     budesonide-formoterol (Symbicort) 80-4.5 MCG/ACT inhaler; Inhale 2 puffs 2 (Two) Times a Day. Rinse mouth with water after use.  Dispense: 1 each; Refill: 11  -     Tiotropium Bromide Monohydrate (Spiriva Respimat) 1.25 MCG/ACT aerosol solution inhaler; Inhale 2 puffs Daily.  Dispense: 4 g; Refill: 11           Return in about 5 months (around 6/24/2022) for Recheck, For Matilde Flower), ....Also 11 mths w/ Dr. Barajas.    DISCUSSION (if any):  It appears that his symptoms of asthma are under adequate control with the current regimen.    Patient's medications for underlying asthma were reviewed in great detail.    He will need to continue Dupixent every 2 weeks.     Compliance with medications stressed.     Side effects of prescribed medications discussed with the patient.    Patient was advised to continue his nasal spray, especially given improvement in symptoms overall.    Continue treatment with BiPAP at a pressure of 13/5, with a nasal mask.    Patient seems to be compliant with PAP device, based on the available data and his account of improved symptoms.     Compliance data was obtained from the [employer] device/DME company.    Sleep hygiene measures were discussed in great detail including need to follow a strict bedtime and to avoid electronic devices in bed and close to bedtime.    he was also asked to avoid  caffeinated or alcoholic beverages within 4 to 6 hours of bedtime.    Weight loss advised.    The patient was once again reminded to continue using the PAP device regularly, every night for atleast 4 hours.    The patient was asked to call this office if the symptoms worsen.    Patient is aware of the recall and he has registered the device with [employer].    Patient can look into adding inline filter and to consider changing it on a regular basis.        Dictated utilizing Dragon dictation.    This document was electronically signed by Linda Barajas MD on 01/24/22 at 11:50 EST

## 2022-01-26 ENCOUNTER — OFFICE VISIT (OUTPATIENT)
Dept: SURGERY | Facility: CLINIC | Age: 62
End: 2022-01-26

## 2022-01-26 VITALS
DIASTOLIC BLOOD PRESSURE: 58 MMHG | RESPIRATION RATE: 18 BRPM | TEMPERATURE: 98.1 F | OXYGEN SATURATION: 98 % | HEIGHT: 70 IN | HEART RATE: 82 BPM | BODY MASS INDEX: 43.09 KG/M2 | SYSTOLIC BLOOD PRESSURE: 126 MMHG | WEIGHT: 301 LBS

## 2022-01-26 DIAGNOSIS — Z48.89 POSTOPERATIVE VISIT: Primary | ICD-10-CM

## 2022-01-26 PROCEDURE — 99024 POSTOP FOLLOW-UP VISIT: CPT | Performed by: SURGERY

## 2022-02-10 NOTE — PROGRESS NOTES
"Chief Complaint  Hypertension, Hyperlipidemia, and Diabetes    Subjective          Denzel Harding presents to [facility]  History of Present Illness  HPI: Patient is here to follow up on the blood pressure  The patient is taking the blood pressure medications as prescribed and has had no side effects. The patient is also here to follow up on the cholesterol and is trying to follow a diet. The patient is also here to follow on sugar  and is  due to get lab work done .  He follows up with [facility], for his sugar, he also states he is undergoing EGD with GI at , the patient also needs refills on medications .  He complains of muscle spasm and states he needs refills on Zanaflex that he was taking in the past, he complains of chronic pain and would like to see pain clinic  Hypertension   Pertinent negatives include no chest pain, palpitations or shortness of breath.   Hyperlipidemia   Pertinent negatives include no chest pain or shortness of breath.   Diabetes   Pertinent negatives for hypoglycemia include no dizziness, nervousness/anxiousness or pallor. Pertinent negatives for diabetes include no chest pain, no shortness of breath  Patient is on acei/arb     Objective   Vital Signs:   /80   Pulse 112   Temp 98.2 °F (36.8 °C)   Resp 16   Ht 177.8 cm (70\")   Wt (!) 138 kg (304 lb)   SpO2 98%   BMI 43.62 kg/m²     Physical Exam  Vitals and nursing note reviewed.   Constitutional:       General: He is not in acute distress.     Appearance: Normal appearance. He is not diaphoretic.   HENT:      Head: Normocephalic and atraumatic.      Right Ear: External ear normal.      Left Ear: External ear normal.      Nose: Nose normal.   Eyes:      Extraocular Movements: Extraocular movements intact.      Conjunctiva/sclera: Conjunctivae normal.   Neck:      Trachea: Trachea normal.   Cardiovascular:      Rate and Rhythm: Normal rate and regular rhythm.      Heart sounds: Normal " heart sounds.   Pulmonary:      Effort: Pulmonary effort is normal. No respiratory distress.   Abdominal:      General: Abdomen is flat.   Musculoskeletal:      Cervical back: Neck supple.      Comments: Moves all limbs  In wheelchair   Skin:     General: Skin is warm and dry.      Findings: No erythema.   Neurological:      Mental Status: He is alert and oriented to person, place, and time.      Comments: No gross motor or sensory deficits        Result Review :                   Assessment and Plan    Diagnoses and all orders for this visit:    1. Benign essential hypertension (Primary)  -     clopidogrel (PLAVIX) 75 MG tablet; Take 1 tablet by mouth Daily.  Dispense: 90 tablet; Refill: 1  -     spironolactone (ALDACTONE) 25 MG tablet; Take 1 tablet by mouth Daily.  Dispense: 90 tablet; Refill: 1  -     metoprolol succinate XL (TOPROL-XL) 200 MG 24 hr tablet; Take 1 tablet by mouth Daily  Dispense: 90 tablet; Refill: 1  -     lisinopril (PRINIVIL,ZESTRIL) 5 MG tablet; Take 1 tablet by mouth Daily.  Dispense: 90 tablet; Refill: 1    2. Mixed hyperlipidemia  -     CBC & Differential  -     Comprehensive Metabolic Panel  -     Lipid Panel  -     atorvastatin (LIPITOR) 80 MG tablet; Take 1 tablet by mouth Every Night.  Dispense: 90 tablet; Refill: 1    3. Type 2 diabetes mellitus with hyperglycemia, with long-term current use of insulin ([facility])    4. Chronic pain syndrome  -     Ambulatory Referral to Pain Management    5. Gastroesophageal reflux disease with esophagitis without hemorrhage  -     omeprazole (priLOSEC) 20 MG capsule; Take 1 capsule by mouth Daily.  Dispense: 90 capsule; Refill: 1    6. Muscle spasm of back  -     tiZANidine (ZANAFLEX) 4 MG tablet; Take 1 tablet by mouth At Night As Needed for Muscle Spasms.  Dispense: 30 tablet; Refill: 3    Other orders  -     promethazine (PHENERGAN) 25 MG tablet; Take 1 tablet by mouth At Night As Needed for Nausea or Vomiting.  Dispense: 10 tablet; Refill:  0    Plan:  1.  Benign essential hypertension: Will continue current medication, low-sodium diet advised, Counseled to regularly check BP at home with goal averaging <130/80.   2.mixed hyperlipidemia: will obtain   fasting CMP and lipid panel.  Diet and exercise counseled,  Will continue current medications  3. Diabetes  Mellitus  : will obtain   fasting CMP  and hba1c  , diet and exercise counseled , Will continue current medications  4. Chronic pain syndrome : refer to pain clinic  5.  GERD: Refill omeprazole, to continue per GI, scheduled for EGD  6.  Muscle spasm: As needed muscle relaxant  I spent 30 minutes caring for Denzel on this date of service. This time includes time spent by me in the following activities:preparing for the visit, reviewing tests, performing a medically appropriate examination and/or evaluation , counseling and educating the patient/family/caregiver, ordering medications, tests, or procedures and documenting information in the medical record  Follow Up   Return in about 4 months (around 6/8/2022).  Patient was given instructions and counseling regarding his condition or for health maintenance advice. Please see specific information pulled into the AVS if appropriate.

## 2022-02-28 NOTE — TELEPHONE ENCOUNTER
Caller: DAYO    Relationship: HOME HEALTH    Best call back number: [phone]    What orders are you requesting (i.e. lab or imaging): HOME HEALTH    In what timeframe would the patient need to come in: AS SOON AS POSSIBLE     Where will you receive your lab/imaging services: AT HOME     Additional notes: HOME HEALTH NEEDS PROVIDER TO SIGN FOR HOME HEALTH ORDERS     PLEASE CALL IF WILLING TO DO SO

## 2022-04-13 NOTE — PROGRESS NOTES
Multidisciplinary Rounds    Time: 20min  Patient Name: Denzel Harding  Date of Encounter: 12/19/17 3:03 PM  MRN: [id number]  Admission date: 12/17/2017      Reason for visit: MDR.     Additional information obtained during MDR:  Pt ready for transfer to floor; reports some swallowing difficulties; dysphagia re-assessment ordered.    Current diet: Diet Regular; Thin; Cardiac, Consistent Carbohydrate      Intervention:  Follow treatment plan  Care plan reviewed    Follow up:   RD to follow per protocol      Barbie Thorpe RD  3:03 PM        Hydroxychloroquine Counseling:  I discussed with the patient that a baseline ophthalmologic exam is needed at the start of therapy and every year thereafter while on therapy. A CBC may also be warranted for monitoring.  The side effects of this medication were discussed with the patient, including but not limited to agranulocytosis, aplastic anemia, seizures, rashes, retinopathy, and liver toxicity. Patient instructed to call the office should any adverse effect occur.  The patient verbalized understanding of the proper use and possible adverse effects of Plaquenil.  All the patient's questions and concerns were addressed.

## 2022-05-10 NOTE — TELEPHONE ENCOUNTER
Rx Refill Note  Requested Prescriptions     Pending Prescriptions Disp Refills   • Trintellix 20 MG tablet [Pharmacy Med Name: TRINTELLIX 20MG TB (WAS BRINTELLIX)] 30 tablet      Sig: TAKE 1 TABLET BY MOUTH DAILY      Last office visit with prescribing clinician: 2/10/2022      Next office visit with prescribing clinician: 6/13/2022            Corrina Acevedo LPN  05/10/22, 13:23 EDT

## 2022-05-13 NOTE — OUTREACH NOTE
Prep Survey    Flowsheet Row Responses   [facility] facility patient discharged from? Non-[facility]   Is LACE score < 7 ? Non-BH Discharge   Emergency Room discharge w/ pulse ox? No   Eligibility T[facility]   Date of Admission 05/11/22   Date of Discharge 05/13/22   Discharge diagnosis unavailable   Does the patient have one of the following disease processes/diagnoses(primary or secondary)? Other   Prep survey completed? Yes          ANT CANSE[facility] - Registered Nurse

## 2022-05-16 NOTE — OUTREACH NOTE
Call Center TCM Note    Flowsheet Row Responses   [facility] patient discharged from? Non-BH   Does the patient have one of the following disease processes/diagnoses(primary or secondary)? Other   TCM attempt successful? No   Unsuccessful attempts Attempt 1          Lolly Castañeda RN    5/16/2022, 14:30 EDT

## 2022-05-16 NOTE — OUTREACH NOTE
Call Center TCM Note    Flowsheet Row Responses   [facility] facility patient discharged from? Non-BH   Does the patient have one of the following disease processes/diagnoses(primary or secondary)? Other   TCM attempt successful? No   Unsuccessful attempts Attempt 2          Lolly Castañeda RN    5/16/2022, 15:14 EDT

## 2022-05-17 NOTE — OUTREACH NOTE
Call Center TCM Note    Flowsheet Row Responses   [facility] patient discharged from? Non-   Does the patient have one of the following disease processes/diagnoses(primary or secondary)? Other   TCM attempt successful? No   Unsuccessful attempts Attempt 3          Zander Kapadia RN    5/17/2022, 15:11 EDT

## 2022-06-07 PROBLEM — E11.9 TYPE 2 DIABETES MELLITUS (HCC): Status: RESOLVED | Noted: 2018-01-28 | Resolved: 2022-01-01

## 2022-06-07 PROBLEM — B37.0 THRUSH: Status: RESOLVED | Noted: 2019-06-05 | Resolved: 2022-01-01

## 2022-06-07 PROBLEM — I50.31 ACUTE DIASTOLIC CHF (CONGESTIVE HEART FAILURE) (HCC): Status: RESOLVED | Noted: 2019-09-09 | Resolved: 2022-01-01

## 2022-06-07 PROBLEM — R07.9 CHEST PAIN, UNSPECIFIED TYPE: Status: ACTIVE | Noted: 2022-01-01

## 2022-06-07 PROBLEM — E66.09 NON MORBID OBESITY DUE TO EXCESS CALORIES: Status: RESOLVED | Noted: 2017-08-15 | Resolved: 2022-01-01

## 2022-06-07 PROBLEM — J20.9 ACUTE BRONCHITIS: Status: RESOLVED | Noted: 2017-12-20 | Resolved: 2022-01-01

## 2022-06-07 PROBLEM — E66.01 MORBID OBESITY (HCC): Status: RESOLVED | Noted: 2020-08-31 | Resolved: 2022-01-01

## 2022-06-07 PROBLEM — R55 PRE-SYNCOPE: Status: RESOLVED | Noted: 2020-08-03 | Resolved: 2022-01-01

## 2022-06-07 PROBLEM — E87.20 LACTIC ACIDOSIS: Status: RESOLVED | Noted: 2020-08-03 | Resolved: 2022-01-01

## 2022-06-07 PROBLEM — N17.9 AKI (ACUTE KIDNEY INJURY) (HCC): Status: RESOLVED | Noted: 2019-09-14 | Resolved: 2022-01-01

## 2022-06-07 PROBLEM — H60.90 OTITIS EXTERNA: Status: RESOLVED | Noted: 2021-05-07 | Resolved: 2022-01-01

## 2022-06-07 PROBLEM — R06.02 SHORTNESS OF BREATH: Status: RESOLVED | Noted: 2020-12-03 | Resolved: 2022-01-01

## 2022-06-07 PROBLEM — R10.9 ABDOMINAL DISCOMFORT: Status: RESOLVED | Noted: 2021-08-09 | Resolved: 2022-01-01

## 2022-06-07 PROBLEM — J45.901 ACUTE EXACERBATION OF ASTHMA WITH ALLERGIC RHINITIS: Status: RESOLVED | Noted: 2019-05-22 | Resolved: 2022-01-01

## 2022-06-07 PROBLEM — J01.90 ACUTE SINUSITIS: Status: RESOLVED | Noted: 2019-09-14 | Resolved: 2022-01-01

## 2022-06-07 PROBLEM — G44.59 HEADACHE SYNDROME, COMPLICATED: Status: RESOLVED | Noted: 2018-01-28 | Resolved: 2022-01-01

## 2022-06-07 PROBLEM — R00.2 PALPITATIONS: Status: RESOLVED | Noted: 2017-01-19 | Resolved: 2022-01-01

## 2022-06-07 NOTE — H&P
[facility] HOSPITALIST HISTORY AND PHYSICAL    Patient Identification:  Name:  Denzel Harding  Age:  62 y.o.  Sex:  male  :  1960  MRN:  [id number]   Visit Number:  [id number]  Admit Date: 2022   Primary Care Physician:  Isaura Godoy MD     2022  8:58 AM    Subjective     Chief complaint:   Chief Complaint   Patient presents with   • Chest Pain   • Shortness of Breath     History of presenting illness:   Denzel Harding is a 62 y.o. male with past medical history significant for CAD s/p remote stenting x2 in  followed by CABG x 3V in 2017.  It appears he developed postop pericarditis and required a pericardial window. He believes he has had 1 stent since his CABG surgery in 2017, possibly about 2 years after. Other past medical history is significant for COPD, CHF, CVA, past history of tobacco abuse, morbid obesity per BMI, HTN, HLD, EDD, insulin dependent type II DM and anxiety/depression.     He presented to the emergency department of [facility] on 2022 with complaints of chest pain and shortness of breath.  He reports that approximately 3 AM this morning he woke up with significant nausea.  He did not experience any vomiting, but felt like he might.  He stayed up most of the rest of the night.  At approximately 8:30 AM while riding in the car, he developed some centralized chest pressure associated with shortness of breath.  The pressure was midsternal and eventually did radiate into both arms and up into the left neck and shoulder.  He labels it as a 9 out of 10 on a pain scale.  He had associated diaphoresis.  He came to the ED for further evaluation.  He denies any other recent chest pains or new exertional dyspnea.  He uses 2 LNC continuously on BiPAP nightly.  He follows with a Dr. London Jiménez at [facility] in [place] for his cardiac care.  He has a past history tobacco abuse, but quit in 2017.    Upon arrival to the ED, vitals were /98,  respirations 20, heart rate 87, SPO2 98% on 2L NC.  Afebrile.  Troponin negative x2.  WBC count is within normal limits.  Admission testing for COVID-19 is negative.  CXR shows no radiographic evidence of acute cardiac or pulmonary disease.  EKG shows a sinus rhythm at 92 bpm.  QTc 504 ms.    In the ED, he received treatment with aspirin 324 mg x 1, dexamethasone 8 mg x 1, diphenhydramine 12.5 mg IV x1, nitroglycerin paste 0.5 inches applied to the chest.  He reports some improvement in his chest discomfort with the nitroglycerin paste, but continues to have some chest pain which is currently rated at a 5/10.  Patient has been admitted to the progressive care unit of [facility] for further evaluation and therapy.   ---------------------------------------------------------------------------------------------------------------------   Review of Systems   Constitutional: Negative for chills and fever.   HENT: Negative for congestion, mouth sores, nosebleeds and postnasal drip.    Respiratory: Positive for shortness of breath. Negative for cough and wheezing.    Cardiovascular: Positive for chest pain. Negative for leg swelling.   Gastrointestinal: Positive for nausea. Negative for abdominal pain, constipation, diarrhea and vomiting.   Genitourinary: Negative for difficulty urinating and dysuria.   Musculoskeletal: Positive for arthralgias, back pain, gait problem and neck pain.        Chronic back/neck pain with history of OA and DDD.    Skin: Negative for color change, pallor, rash and wound.   Neurological: Negative for dizziness, syncope, speech difficulty and weakness.   Psychiatric/Behavioral: Negative for agitation, behavioral problems and confusion.      ---------------------------------------------------------------------------------------------------------------------   Past Medical History:   Diagnosis Date   • Anxiety    • Arthritis    • Asthma    • Brachial neuritis 01/19/2017   • Cellulitis      left arm and right leg    • Chest pain last 11/25/21    pt denies any since date of 11/25/21   • CHF (congestive heart failure) ([facility])     Patient reported history May 2020    • COPD (chronic obstructive pulmonary disease) ([facility])    • Coronary artery disease     Patient reported CABG , 3 vessel   • COVID-19 vaccine series completed     Pfizer, plus booster   • Depression    • Diabetes mellitus ([facility])     diagnosed in 1998, checks fsbg 3-4x/day   • Dizziness    • Edema    • GERD (gastroesophageal reflux disease)    • H/O chest x-ray 07/04/2015    Mild Left base atelectasis   • H/O echocardiogram 07/05/2015    Normal LVSF. EF of 60-65%. Grade 1 diastolic dysfunction of the LV myocardium. No evidence of pericardial effusion   • H/O exercise stress test    • Hearing loss     No use of hearing aids   • History of fracture     Reported 2 bones in left foot and a finger   • History of herniated intervertebral disc     History of left L5-S1 disc herniation   • History of nuclear stress test 2021    x2   • History of pneumonia    • History of pneumonia    • Hyperlipidemia    • Hypertension    • Impaired functional mobility, balance, gait, and endurance    • LOM (loss of memory) 1/19/2017   • Lower back pain     Right   • Measles    • MUSE (nonalcoholic steatohepatitis)    • Neuropathy     feet    • EDD treated with BiPAP     BiPAP HS - instructed to bring mask/machine DOS (setting 13 and 5)   • Oxygen dependent     2L NC   • Palpitations    • Pericardial effusion    • Poor dentition     Patient reported missing multiple teeth   • Renal disorder    • Seasonal allergies    • Seizure disorder ([facility])     focal seizures   • SOB (shortness of breath)     no report of any new onset or worsening of symptoms   • Staph infection     back after sugery at the incision site, on Rside face    • Stroke ([facility])     x5 most recent 03/2021, slight weakness in hands occasionaly    • Wears glasses     reading glasses     Past Surgical History:    Procedure Laterality Date   • BACK SURGERY     • CARDIAC CATHETERIZATION     • CARDIAC CATHETERIZATION N/A 8/11/2017    Procedure: Coronary angiography;  Surgeon: Dallas Hernandez MD;  Location: [facility] CATH INVASIVE LOCATION;  Service:    • CARDIAC CATHETERIZATION N/A 8/11/2017    Procedure: Left Heart Cath;  Surgeon: Dallas Hernandez MD;  Location: [facility] CATH INVASIVE LOCATION;  Service:    • CARDIAC CATHETERIZATION N/A 8/11/2017    Procedure: Left ventriculography;  Surgeon: Dallas Hernandez MD;  Location: [facility] CATH INVASIVE LOCATION;  Service:    • CARDIAC CATHETERIZATION      2002 x1 stent,  x1 stent   • CARDIOVASCULAR STRESS TEST  07/03/2017    WITH DR HERNANDEZ AT [facility]   • CARPAL TUNNEL RELEASE Right    • CATARACT EXTRACTION W/ INTRAOCULAR LENS IMPLANT Right 6/12/2017    Procedure: CATARACT PHACO EXTRACTION WITH INTRAOCULAR LENS IMPLANT RIGHT ;  Surgeon: Natalie Cao MD;  Location: [facility] OR;  Service:    • CATARACT EXTRACTION W/ INTRAOCULAR LENS IMPLANT Left 7/10/2017    Procedure: CATARACT PHACO EXTRACTION WITH INTRAOCULAR LENS IMPLANT LEFT;  Surgeon: Natalie Cao MD;  Location: [facility] OR;  Service:    • CHOLECYSTECTOMY     • COLONOSCOPY     • COLONOSCOPY N/A 7/2/2020    Procedure: COLONOSCOPY;  Surgeon: Petra Crowell MD;  Location: [facility] ENDOSCOPY;  Service: Gastroenterology;  Laterality: N/A;  poor prep   • CORONARY ANGIOPLASTY WITH STENT PLACEMENT      X1-LAD 2002, Coronary 2019 (x2 total)   • CORONARY ARTERY BYPASS GRAFT N/A 8/15/2017    Procedure: CORONARY ARTERY BYPASS GRAFTING x 3 UTILIZING THE LEFT INTERNAL MAMMARY ARTERY WITH ENDOSCOPIC VEIN HARVESTING OF THE RIGHT GREATER SAPHENOUS VEIN, HAMLET, LAD ENDARECTOMY;  Surgeon: London Damon MD;  Location: [facility] OR;  Service:    • ENDOSCOPY     • EYE CAPSULOTOMY WITH LASER Right 11/25/2019    Procedure: EYE CAPSULOTOMY WITH LASER RIGHT;  Surgeon: Natalie Cao MD;  Location: [facility] OR;  Service: Ophthalmology   • EYE CAPSULOTOMY  WITH LASER Left 12/9/2019    Procedure: EYE CAPSULOTOMY WITH LASER LEFT;  Surgeon: Natalie Cao MD;  Location:  [facility] OR;  Service: Ophthalmology   • EYE SURGERY      cataract surgery both eyes   • INTERVENTIONAL RADIOLOGY PROCEDURE Bilateral 12/31/2019    Procedure: Carotid Cerebral Angiogram;  Surgeon: Damian Dubois MD;  Location:  [facility] CATH INVASIVE LOCATION;  Service: Interventional Radiology   • KNEE ARTHROSCOPY Bilateral    • KNEE ARTHROSCOPY Right 2010    Dr Lewis   • KNEE ARTHROSCOPY Left 2008    Dr Jameson   • KNEE MENISCECTOMY Right 10/15/2020    Procedure: Right knee arthroscopy with partial medial and lateral meniscectomy and three compartment chondroplasty.;  Surgeon: South Lewis MD;  Location:  [facility] OR;  Service: Orthopedics;  Laterality: Right;   • MOUTH SURGERY      complete extraction    • NEUROPLASTY / TRANSPOSITION ULNAR NERVE AT ELBOW Left    • OTHER SURGICAL HISTORY      Foraminotomy and discectomy   • PERICARDIAL WINDOW N/A 8/25/2017    Procedure: PERICARDIAL WINDOW;  Surgeon: Freeman Phillips MD;  Location:  [facility] OR;  Service:    • VENTRAL/INCISIONAL HERNIA REPAIR N/A 1/12/2022    Procedure: INCISIONAL HERNIA REPAIR  WITH MESH;  Surgeon: Torres Kim MD;  Location:  [facility] OR;  Service: General;  Laterality: N/A;     Family History   Problem Relation Age of Onset   • Hypertension Mother    • Arthritis Mother    • Stomach cancer Mother    • Alcohol abuse Father    • Heart disease Other    • Hypertension Other    • Other Other         Neurologic disorder   • Heart attack Other    • Parkinsonism Other    • Stroke Other    • Heart disease Other    • Hypertension Other    • Heart disease Other    • Stroke Other    • Hypertension Other    • Colon cancer Neg Hx      Social History     Socioeconomic History   • Marital status:    • Number of children: 1   Tobacco Use   • Smoking status: Former Smoker     Packs/day: 1.00     Years: 10.00     Pack years: 10.00     Types:  Cigarettes     Quit date: 1998     Years since quittin.4   • Smokeless tobacco: Former User     Types: Snuff     Quit date:    Vaping Use   • Vaping Use: Never used   Substance and Sexual Activity   • Alcohol use: Yes     Comment: 3 drinks a year   • Drug use: No   • Sexual activity: Defer     ---------------------------------------------------------------------------------------------------------------------   Allergies:  Ajovy [fremanezumab-vfrm], Sulfa antibiotics, Invokana [canagliflozin], Codeine, and Morphine  ---------------------------------------------------------------------------------------------------------------------   Prior to Admission Medications     Prescriptions Last Dose Informant Patient Reported? Taking?    albuterol sulfate HFA (Ventolin HFA) 108 (90 Base) MCG/ACT inhaler  Self No No    Inhale 2 puffs Every 4 (Four) Hours As Needed for Wheezing or Shortness of Air.    aspirin  MG tablet  Self No No    Take 1 tablet by mouth Daily.    Patient taking differently:  Take 325 mg by mouth Daily. PT not taking due to surgery.    atorvastatin (LIPITOR) 80 MG tablet   No No    Take 1 tablet by mouth Every Night.    B-D ULTRAFINE III SHORT PEN 31G X 8 MM misc  Self Yes No    2 (Two) Times a Day.    budesonide-formoterol (Symbicort) 80-4.5 MCG/ACT inhaler   No No    Inhale 2 puffs 2 (Two) Times a Day. Rinse mouth with water after use.    clopidogrel (PLAVIX) 75 MG tablet   No No    Take 1 tablet by mouth Daily.    coenzyme Q10 100 MG capsule  Self Yes No    Take 100 mg by mouth Daily.    Continuous Blood Gluc Sensor (FreeStyle Radha 2 Sensor) misc   Yes No    docusate sodium (Colace) 100 MG capsule  Self No No    Take 1 capsule by mouth 2 (Two) Times a Day As Needed for Constipation.    Dupixent 300 MG/2ML solution prefilled syringe   No No    INJECT 300 MG SUBCUTANEOUSLY EVERY OTHER WEEK    furosemide (LASIX) 40 MG tablet   No No    Take 1 tablet by mouth Daily As Needed (edema).     gabapentin (NEURONTIN) 600 MG tablet  Self No No    Take 1 tablet by mouth 3 (Three) Times a Day.    Insulin Regular Human, Conc, (HumuLIN R U-500 KwikPen) 500 UNIT/ML solution pen-injector CONCENTRATED injection  Self Yes No    Inject  under the skin into the appropriate area as directed 3 (Three) Times a Day Before Meals. Take 180 units in am, 180 units in afternoon and evening.    ipratropium-albuterol (DUO-NEB) 0.5-2.5 mg/3 ml nebulizer  Self No No    Take 3 mL by nebulization 4 (Four) Times a Day As Needed for Wheezing or Shortness of Air.    isosorbide mononitrate (IMDUR) 60 MG 24 hr tablet  Self No No    Take 1 tablet by mouth Daily    lamoTRIgine (LaMICtal) 100 MG tablet  Self Yes No    Take 100 mg by mouth 2 (Two) Times a Day.    levETIRAcetam (KEPPRA) 1000 MG tablet  Self Yes No    Take 1,000 mg by mouth 2 (Two) Times a Day.    levocetirizine (XYZAL) 5 MG tablet  Self No No    Take 1 tablet by mouth Every Evening.    levoFLOXacin (LEVAQUIN) 500 MG tablet   No No    Take 1 tablet by mouth Daily.    lisinopril (PRINIVIL,ZESTRIL) 5 MG tablet   No No    Take 1 tablet by mouth Daily.    metoprolol succinate XL (TOPROL-XL) 200 MG 24 hr tablet   No No    Take 1 tablet by mouth Daily    mupirocin (Bactroban) 2 % cream   No No    Apply 1 application topically to the appropriate area as directed 3 (Three) Times a Day.    Nebulizer device  Self No No    1 Device Take As Directed.    nitroglycerin (NITROLINGUAL) 0.4 MG/SPRAY spray  Self No No    Place 1 spray under the tongue Every 5 (Five) Minutes As Needed for Chest Pain.    nortriptyline (PAMELOR) 25 MG capsule   Yes No    Take 25 mg by mouth.    nortriptyline (PAMELOR) 50 MG capsule  Self Yes No    Take 50 mg by mouth Every Night.    omeprazole (priLOSEC) 20 MG capsule   No No    Take 1 capsule by mouth Daily.    potassium chloride (K-DUR,KLOR-CON) 20 MEQ CR tablet   No No    Take 1 tablet by mouth Daily As Needed (take with lasix).    predniSONE (DELTASONE) 20 MG  tablet   No No    Take 1 tablet by mouth 2 (Two) Times a Day.    promethazine (PHENERGAN) 25 MG tablet   No No    Take 1 tablet by mouth At Night As Needed for Nausea or Vomiting.    Semaglutide, 1 MG/DOSE, (Ozempic, 1 MG/DOSE,) 2 MG/1.5ML solution pen-injector  Self No No    Inject 1 mg under the skin into the appropriate area as directed 1 (One) Time Per Week.    spironolactone (ALDACTONE) 25 MG tablet   No No    Take 1 tablet by mouth Daily.    Tiotropium Bromide Monohydrate (Spiriva Respimat) 1.25 MCG/ACT aerosol solution inhaler   No No    Inhale 2 puffs Daily.    tiZANidine (ZANAFLEX) 4 MG tablet   No No    Take 1 tablet by mouth At Night As Needed for Muscle Spasms.    Trintellix 20 MG tablet   No No    TAKE 1 TABLET BY MOUTH DAILY    TURMERIC PO  Self Yes No    Take 500 mg by mouth 2 (Two) Times a Day.    ubrogepant tablet   Yes No    vitamin C (ASCORBIC ACID) 500 MG tablet  Self Yes No    Take 500 mg by mouth Daily.    vitamin D3 125 MCG (5000 UT) capsule capsule  Self Yes No    Take 5,000 Units by mouth Daily.    vitamin E 400 UNIT capsule  Self No No    Take 1 capsule by mouth 2 (two) times a day.    zafirlukast (Accolate) 20 MG tablet  Self No No    Take 1 tablet by mouth 2 (Two) Times a Day.        ---------------------------------------------------------------------------------------------------------------------  Objective   Hospital Scheduled Meds:  labetalol, 10 mg, Intravenous, Once         ---------------------------------------------------------------------------------------------------------------------   Vital Signs:  Temp:  [98.5 °F (36.9 °C)] 98.5 °F (36.9 °C)  Heart Rate:  [82-87] 82  Resp:  [18-20] 18  BP: (143-189)/(78-98) 143/78  Mean Arterial Pressure (Non-Invasive) for the past 24 hrs (Last 3 readings):   Noninvasive MAP (mmHg)   06/07/22 1219 96   06/07/22 1202 100   06/07/22 1148 101     SpO2 Percentage    06/07/22 1148 06/07/22 1202 06/07/22 1219   SpO2: 99% 96% 99%     SpO2:  [96  %-100 %] 99 %  on  Flow (L/min):  [2] 2;   Device (Oxygen Therapy): nasal cannula    Body mass index is 41.61 kg/m².  Wt Readings from Last 3 Encounters:   06/07/22 132 kg (290 lb)   03/19/22 132 kg (290 lb)   02/10/22 (Abnormal) 138 kg (304 lb)     ---------------------------------------------------------------------------------------------------------------------   Physical Exam:  Constitutional:  Well-developed and well-nourished.  No respiratory distress on baseline of 2LNC.     HENT:  Head: Normocephalic and atraumatic.  Mouth:  Moist mucous membranes.    Eyes:  Conjunctivae and EOM are normal. No scleral icterus. No erythema or drainage.   Neck:  Neck supple.  Cardiovascular:  Normal rate, regular rhythm and normal heart sounds with no murmur.  Pulmonary/Chest:  No respiratory distress, no wheezes, no crackles, with normal breath sounds and good air movement. Large mid-sternal scar present.   Abdominal:  Soft.  Bowel sounds are present x4.  No distension and no tenderness.   Musculoskeletal:  No edema, no tenderness, and no deformity.  No red or swollen joints anywhere.   Neurological:  Alert and oriented to person, place, and time. No tongue deviation.  No facial droop.  No slurred speech.   Skin:  Skin is warm and dry.  No rash noted.  No pallor.   Psychiatric:  Normal mood and affect.  Behavior is normal.  Judgment and thought content normal.   Peripheral vascular:  No edema and 1-2+ pedal pulses bilaterally.   Genitourinary: No fraser catheter in place.  ---------------------------------------------------------------------------------------------------------------------  EKG:  Pending cardiology interpretation. Per my read, reveals  sinus rhythm at 92 bpm.  QTc 504 ms.    I have personally reviewed the EKG.  ---------------------------------------------------------------------------------------------------------------------   Results from last 7 days   Lab Units 06/07/22  1118 06/07/22  0919   TROPONIN T  ng/mL <0.010 <0.010           Results from last 7 days   Lab Units 06/07/22  0919   WBC 10*3/mm3 8.90   HEMOGLOBIN g/dL 13.1   HEMATOCRIT % 40.5   MCV fL 79.6   MCHC g/dL 32.3   PLATELETS 10*3/mm3 183     Results from last 7 days   Lab Units 06/07/22  0919   SODIUM mmol/L 139   POTASSIUM mmol/L 3.6   CHLORIDE mmol/L 100   CO2 mmol/L 27.7   BUN mg/dL 8   CREATININE mg/dL 0.79   CALCIUM mg/dL 9.4   GLUCOSE mg/dL 118*   ALBUMIN g/dL 3.84   BILIRUBIN mg/dL 0.5   ALK PHOS U/L 112   AST (SGOT) U/L 45*   ALT (SGPT) U/L 40   Estimated Creatinine Clearance: 134.2 mL/min (by C-G formula based on SCr of 0.79 mg/dL).  No results found for: AMMONIA    No results found for: HGBA1C, POCGLU  Lab Results   Component Value Date    HGBA1C 9.90 (H) 08/10/2021     Lab Results   Component Value Date    TSH 0.535 08/10/2021    FREET4 0.99 04/11/2017     No results found for: BLOODCX  No results found for: URINECX  No results found for: WOUNDCX  No results found for: STOOLCX  No results found for: RESPCX    Pain Management Panel     Pain Management Panel Latest Ref Rng & Units 10/15/2019 9/14/2019    AMPHETAMINES SCREEN, URINE Negative Negative Negative    BARBITURATES SCREEN Negative Negative Negative    BENZODIAZEPINE SCREEN, URINE Negative Negative Negative    BUPRENORPHINEUR Negative Negative Negative    COCAINE SCREEN, URINE Negative Negative Negative    METHADONE SCREEN, URINE Negative Negative Negative    METHAMPHETAMINEUR Negative Negative Negative        I have personally reviewed the above laboratory results.   ---------------------------------------------------------------------------------------------------------------------  Imaging Results (Last 7 Days)     Procedure Component Value Units Date/Time    XR Chest 1 View [[id number]] Collected: 06/07/22 0933     Updated: 06/07/22 0937    Narrative:      XR CHEST 1 VW-     CLINICAL INDICATION: Chest pain protocol        COMPARISON: None available      TECHNIQUE: Single frontal view  of the chest.     FINDINGS:      LUNGS: Lungs are adequately aerated.      HEART AND MEDIASTINUM: Heart and mediastinal contours are unremarkable        SKELETON: Bony and soft tissue structures are unremarkable.             Impression:      No radiographic evidence of acute cardiac or pulmonary disease.     This report was finalized on 6/7/2022 9:34 AM by Dr. Abdifatah Shaffer MD.             ---------------------------------------------------------------------------------------------------------------------  Assessment & Plan      Acute Hospital Problems:    -Chest pains with typical features for unstable angina   -CAD s/p remote stenting x2 2002, CABG 3V 2017, and reported stenting x1 since his CABG  -Hx of postop pericarditis requiring pericardial window 2017  -Dyslipidemia  · Initial EKG without any acute changes concerning for ischemia or infarct.  · Serial troponins negative x2, follow-up on serial troponins and EKG.  · Received aspirin 324 mg x 1 in the ED, continue with daily aspirin 81 mg from tomorrow.  · Continue home atorvastatin 80 mg daily and check fasting lipid panel in AM.  · Check hemoglobin A1c level.  · Continue home Toprol-XL.   · Continue Nitropaste and increase to 1 inch every 6 hours.  Hold after midnight or for low blood pressure.  · Previous HAMLET report from 3/2021 showed EF of 56 to 60% with moderate calcification of the aortic valve.  Repeat TTE.  · Consult cardiology, appreciate their input.  · We will keep n.p.o. after midnight tonight in case of need for further evaluation in AM.    -Uncontrolled hypertension  · Patient reports that his blood pressure generally runs between 110 through 120 systolic at home.  BP 180s over 90s on presentation.  Could be secondary to chest pains.  · BP improved with application of nitroglycerin paste.  · Resume home antihypertensive regimen as appropriate with holding parameters once confirmed by pharmacy.   · Monitor serial checks and adjust home medication  regimen if needed.    -Prolonged QTc, 504ms  • Check magnesium level and replace if needed.   • Avoid QT prolonging agents.   • Monitor on telemetry.     -Type II IDDM   HA1c level 9.9% in 2021, repeat pending.   Place on hypoglycemia protocol.  Patient on U500 180 units BID. Confirm with pharmacy.   Glucose 118.   Monitor accuchecks AC/HS.     -Chronic hypoxic respiratory failure  -Obstructive sleep apnea, uses BiPAP nightly.  -COPD without acute exacerbation  -Asthma without acute exacerbation  -Past history tobacco abuse, quit in 2017  · Currently maintaining O2 saturations on his baseline of 2 LNC.  · Continue BiPAP nightly.  · CXR unremarkable.    -DVT Prophylaxis  • Lovenox.    -Admission COVID-19 testing  • Negative       Chronic Medical Problems:    · Chronic HF PEF: Compensated.  Repeat echocardiogram pending.  · Past history of tobacco abuse, quit in 2017.  · History of CVA in 2021  · Morbid obesity per BMI, 41.61    The patient is considered to be a high risk patient due to: Chest pain with known CAD and multiple cardiac risk factors.    Code Status: FULL CODE.     I have discussed the patient's assessment and plan with the patient and admitting physician, Dr. Johns.     Disposition: Plans to return home on discharge.    Expected length of stay: INPATIENT status due to the need for care which can only be reasonably provided in an hospital setting, such as aggressive/expedited ancillary services and/or consultation services, the necessity for IV medications, close physician monitoring and/or the possible need for procedures.  In such, I feel the patient’s risk for adverse outcomes and need for care warrant INPATIENT evaluation and predict the patient’s care encounter to likely last beyond 2 midnights.    Carole Jhaveri PA-C  06/07/22  12:29 EDT  ---------------------------------------------------------------------------------------------------------------------

## 2022-06-07 NOTE — PLAN OF CARE
Problem: Adult Inpatient Plan of Care  Goal: Plan of Care Review  Outcome: Ongoing, Progressing  Flowsheets (Taken 6/7/2022 1915)  Progress: no change  Plan of Care Reviewed With: patient  Goal: Patient-Specific Goal (Individualized)  Outcome: Ongoing, Progressing  Goal: Absence of Hospital-Acquired Illness or Injury  Outcome: Ongoing, Progressing  Intervention: Identify and Manage Fall Risk  Recent Flowsheet Documentation  Taken 6/7/2022 1700 by Jeni Brumfield RN  Safety Promotion/Fall Prevention: safety round/check completed  Taken 6/7/2022 1500 by Jeni Brumfield RN  Safety Promotion/Fall Prevention: safety round/check completed  Intervention: Prevent Infection  Recent Flowsheet Documentation  Taken 6/7/2022 1700 by Jeni Brumfield RN  Infection Prevention: rest/sleep promoted  Taken 6/7/2022 1500 by Jeni Brumfield RN  Infection Prevention: rest/sleep promoted  Goal: Optimal Comfort and Wellbeing  Outcome: Ongoing, Progressing  Goal: Readiness for Transition of Care  Outcome: Ongoing, Progressing     Problem: Fall Injury Risk  Goal: Absence of Fall and Fall-Related Injury  Outcome: Ongoing, Progressing  Intervention: Identify and Manage Contributors  Recent Flowsheet Documentation  Taken 6/7/2022 1700 by Jeni Brumfield RN  Medication Review/Management: medications reviewed  Taken 6/7/2022 1500 by Jeni Brumfield RN  Medication Review/Management: medications reviewed  Intervention: Promote Injury-Free Environment  Recent Flowsheet Documentation  Taken 6/7/2022 1700 by Jeni Brumfield RN  Safety Promotion/Fall Prevention: safety round/check completed  Taken 6/7/2022 1500 by Jeni Brumfield RN  Safety Promotion/Fall Prevention: safety round/check completed     Problem: Adult Inpatient Plan of Care  Goal: Plan of Care Review  6/7/2022 1916 by Jeni Brumfield RN  Outcome: Ongoing, Progressing  Flowsheets  Taken 6/7/2022 1916  Progress: improving  Taken 6/7/2022 1915  Plan of Care Reviewed With:  patient  6/7/2022 1915 by Jeni Brumfield RN  Outcome: Ongoing, Progressing  Flowsheets (Taken 6/7/2022 1915)  Progress: no change  Plan of Care Reviewed With: patient  Goal: Patient-Specific Goal (Individualized)  Outcome: Ongoing, Progressing  Goal: Absence of Hospital-Acquired Illness or Injury  Outcome: Ongoing, Progressing  Intervention: Identify and Manage Fall Risk  Recent Flowsheet Documentation  Taken 6/7/2022 1700 by Jeni Brumfield RN  Safety Promotion/Fall Prevention: safety round/check completed  Taken 6/7/2022 1500 by Jeni Brumfield RN  Safety Promotion/Fall Prevention: safety round/check completed  Intervention: Prevent Infection  Recent Flowsheet Documentation  Taken 6/7/2022 1700 by Jeni Brumfield RN  Infection Prevention: rest/sleep promoted  Taken 6/7/2022 1500 by Jeni Brumfield RN  Infection Prevention: rest/sleep promoted  Goal: Optimal Comfort and Wellbeing  Outcome: Ongoing, Progressing  Goal: Readiness for Transition of Care  Outcome: Ongoing, Progressing     Problem: Fall Injury Risk  Goal: Absence of Fall and Fall-Related Injury  Outcome: Ongoing, Progressing  Intervention: Identify and Manage Contributors  Recent Flowsheet Documentation  Taken 6/7/2022 1700 by Jeni Brumfield RN  Medication Review/Management: medications reviewed  Taken 6/7/2022 1500 by Jeni Brumfield RN  Medication Review/Management: medications reviewed  Intervention: Promote Injury-Free Environment  Recent Flowsheet Documentation  Taken 6/7/2022 1700 by Jeni Brumfield RN  Safety Promotion/Fall Prevention: safety round/check completed  Taken 6/7/2022 1500 by Jeni Brumfield RN  Safety Promotion/Fall Prevention: safety round/check completed

## 2022-06-07 NOTE — ED PROVIDER NOTES
Subjective   62-year-old male with past medical history of hypertension, hyperlipidemia, COPD, coronary artery disease requiring CABG, and congestive heart failure presents to the ER with primary complaint of retrosternal crushing chest pain with radiation into both shoulders.  Patient confirmed associated shortness of breath.  Denied obvious aggravating factors.  Denied obvious alleviating factors.  Patient has symptoms been present for the last 2 to 3 hours.  Patient noted episodes occur and lasts a few minutes.  Vitals stable          Review of Systems   Respiratory: Positive for shortness of breath.    Cardiovascular: Positive for chest pain.   All other systems reviewed and are negative.      Past Medical History:   Diagnosis Date   • Anxiety    • Arthritis    • Asthma    • Brachial neuritis 01/19/2017   • Cellulitis     left arm and right leg    • Chest pain last 11/25/21    pt denies any since date of 11/25/21   • CHF (congestive heart failure) ([facility])     Patient reported history May 2020    • COPD (chronic obstructive pulmonary disease) ([facility])    • Coronary artery disease     Patient reported CABG , 3 vessel   • COVID-19 vaccine series completed     Pfizer, plus booster   • Depression    • Diabetes mellitus (HCC)     diagnosed in 1998, checks fsbg 3-4x/day   • Dizziness    • Edema    • GERD (gastroesophageal reflux disease)    • H/O chest x-ray 07/04/2015    Mild Left base atelectasis   • H/O echocardiogram 07/05/2015    Normal LVSF. EF of 60-65%. Grade 1 diastolic dysfunction of the LV myocardium. No evidence of pericardial effusion   • H/O exercise stress test    • Hearing loss     No use of hearing aids   • History of fracture     Reported 2 bones in left foot and a finger   • History of herniated intervertebral disc     History of left L5-S1 disc herniation   • History of nuclear stress test 2021    x2   • History of pneumonia    • History of pneumonia    • Hyperlipidemia    • Hypertension    •  Impaired functional mobility, balance, gait, and endurance    • LOM (loss of memory) 1/19/2017   • Lower back pain     Right   • Measles    • MUSE (nonalcoholic steatohepatitis)    • Neuropathy     feet    • EDD treated with BiPAP     BiPAP HS - instructed to bring mask/machine DOS (setting 13 and 5)   • Oxygen dependent     2L NC   • Palpitations    • Pericardial effusion    • Poor dentition     Patient reported missing multiple teeth   • Renal disorder    • Seasonal allergies    • Seizure disorder (HCC)     focal seizures   • SOB (shortness of breath)     no report of any new onset or worsening of symptoms   • Staph infection     back after sugery at the incision site, on Rside face    • Stroke (HCC)     x5 most recent 03/2021, slight weakness in hands occasionaly    • Wears glasses     reading glasses       Allergies   Allergen Reactions   • Ajovy [Fremanezumab-Vfrm] Other (See Comments)     Caused tightness of chest, vomiting, pain in both arms.   • Sulfa Antibiotics Anaphylaxis, Nausea And Vomiting and Delirium   • Invokana [Canagliflozin] Unknown - Low Severity     DKA   • Codeine Nausea And Vomiting     Can only take with Phenergan   • Morphine Unknown - Low Severity     Does not work. It causes pain       Past Surgical History:   Procedure Laterality Date   • BACK SURGERY     • CARDIAC CATHETERIZATION     • CARDIAC CATHETERIZATION N/A 8/11/2017    Procedure: Coronary angiography;  Surgeon: Dallas Kim MD;  Location: [facility] CATH INVASIVE LOCATION;  Service:    • CARDIAC CATHETERIZATION N/A 8/11/2017    Procedure: Left Heart Cath;  Surgeon: Dallas Kim MD;  Location: [facility] CATH INVASIVE LOCATION;  Service:    • CARDIAC CATHETERIZATION N/A 8/11/2017    Procedure: Left ventriculography;  Surgeon: Dallas Kim MD;  Location: [facility] CATH INVASIVE LOCATION;  Service:    • CARDIAC CATHETERIZATION      2002 x1 stent,  x1 stent   • CARDIOVASCULAR STRESS TEST  07/03/2017    WITH DR KIM AT  [facility]   • CARPAL TUNNEL RELEASE Right    • CATARACT EXTRACTION W/ INTRAOCULAR LENS IMPLANT Right 6/12/2017    Procedure: CATARACT PHACO EXTRACTION WITH INTRAOCULAR LENS IMPLANT RIGHT ;  Surgeon: Natalie Cao MD;  Location: [facility] OR;  Service:    • CATARACT EXTRACTION W/ INTRAOCULAR LENS IMPLANT Left 7/10/2017    Procedure: CATARACT PHACO EXTRACTION WITH INTRAOCULAR LENS IMPLANT LEFT;  Surgeon: Natalie Cao MD;  Location: [facility] OR;  Service:    • CHOLECYSTECTOMY     • COLONOSCOPY     • COLONOSCOPY N/A 7/2/2020    Procedure: COLONOSCOPY;  Surgeon: Petra Crowell MD;  Location: [facility] ENDOSCOPY;  Service: Gastroenterology;  Laterality: N/A;  poor prep   • CORONARY ANGIOPLASTY WITH STENT PLACEMENT      X1-LAD 2002, Coronary 2019 (x2 total)   • CORONARY ARTERY BYPASS GRAFT N/A 8/15/2017    Procedure: CORONARY ARTERY BYPASS GRAFTING x 3 UTILIZING THE LEFT INTERNAL MAMMARY ARTERY WITH ENDOSCOPIC VEIN HARVESTING OF THE RIGHT GREATER SAPHENOUS VEIN, HAMLET, LAD ENDARECTOMY;  Surgeon: London Damon MD;  Location: [facility] OR;  Service:    • ENDOSCOPY     • EYE CAPSULOTOMY WITH LASER Right 11/25/2019    Procedure: EYE CAPSULOTOMY WITH LASER RIGHT;  Surgeon: Natalie Cao MD;  Location: [facility] OR;  Service: Ophthalmology   • EYE CAPSULOTOMY WITH LASER Left 12/9/2019    Procedure: EYE CAPSULOTOMY WITH LASER LEFT;  Surgeon: Natalie Cao MD;  Location: [facility] OR;  Service: Ophthalmology   • EYE SURGERY      cataract surgery both eyes   • INTERVENTIONAL RADIOLOGY PROCEDURE Bilateral 12/31/2019    Procedure: Carotid Cerebral Angiogram;  Surgeon: Damian Dubois MD;  Location: [facility] INVASIVE LOCATION;  Service: Interventional Radiology   • KNEE ARTHROSCOPY Bilateral    • KNEE ARTHROSCOPY Right 2010    Dr Lewis   • KNEE ARTHROSCOPY Left 2008    Dr Jameson   • KNEE MENISCECTOMY Right 10/15/2020    Procedure: Right knee arthroscopy with partial medial and lateral meniscectomy and three compartment chondroplasty.;   Surgeon: South Lewis MD;  Location: [facility] OR;  Service: Orthopedics;  Laterality: Right;   • MOUTH SURGERY      complete extraction    • NEUROPLASTY / TRANSPOSITION ULNAR NERVE AT ELBOW Left    • OTHER SURGICAL HISTORY      Foraminotomy and discectomy   • PERICARDIAL WINDOW N/A 2017    Procedure: PERICARDIAL WINDOW;  Surgeon: Freeman Phillips MD;  [facility];  Service:    • VENTRAL/INCISIONAL HERNIA REPAIR N/A 2022    Procedure: INCISIONAL HERNIA REPAIR  WITH MESH;  Surgeon: Torres Kim MD;  Location: [facility] OR;  Service: General;  Laterality: N/A;       Family History   Problem Relation Age of Onset   • Hypertension Mother    • Arthritis Mother    • Stomach cancer Mother    • Alcohol abuse Father    • Heart disease Other    • Hypertension Other    • Other Other         Neurologic disorder   • Heart attack Other    • Parkinsonism Other    • Stroke Other    • Heart disease Other    • Hypertension Other    • Heart disease Other    • Stroke Other    • Hypertension Other    • Colon cancer Neg Hx        Social History     Socioeconomic History   • Marital status:    • Number of children: 1   Tobacco Use   • Smoking status: Former Smoker     Packs/day: 1.00     Years: 10.00     Pack years: 10.00     Types: Cigarettes     Quit date: 1998     Years since quittin.4   • Smokeless tobacco: Former User     Types: Snuff     Quit date:    Vaping Use   • Vaping Use: Never used   Substance and Sexual Activity   • Alcohol use: Yes     Comment: 3 drinks a year   • Drug use: No   • Sexual activity: Defer           Objective   Physical Exam  Constitutional:       General: He is not in acute distress.     Appearance: He is well-developed. He is not ill-appearing.   HENT:      Head: Normocephalic and atraumatic.   Eyes:      Extraocular Movements: Extraocular movements intact.      Pupils: Pupils are equal, round, and reactive to light.   Neck:      Vascular: No JVD.   Cardiovascular:       Rate and Rhythm: Normal rate and regular rhythm.      Heart sounds: Normal heart sounds. No murmur heard.  Pulmonary:      Effort: No tachypnea, accessory muscle usage or respiratory distress.      Breath sounds: No stridor. Examination of the right-middle field reveals wheezing. Examination of the left-middle field reveals wheezing. Examination of the right-lower field reveals decreased breath sounds. Examination of the left-lower field reveals decreased breath sounds. Decreased breath sounds and wheezing present. No rhonchi or rales.   Chest:      Chest wall: No deformity, tenderness or crepitus.   Abdominal:      General: Bowel sounds are normal.      Palpations: Abdomen is soft.      Tenderness: There is no abdominal tenderness. There is no guarding or rebound.   Musculoskeletal:         General: Normal range of motion.      Cervical back: Normal range of motion and neck supple.      Right lower leg: No tenderness. No edema.      Left lower leg: No tenderness. No edema.   Lymphadenopathy:      Cervical: No cervical adenopathy.   Skin:     General: Skin is warm and dry.      Coloration: Skin is not cyanotic.      Findings: No ecchymosis or erythema.   Neurological:      General: No focal deficit present.      Mental Status: He is alert and oriented to person, place, and time.      Cranial Nerves: No cranial nerve deficit.      Motor: No weakness.   Psychiatric:         Mood and Affect: Mood normal. Mood is not anxious.         Behavior: Behavior normal. Behavior is not agitated.         Procedures           ED Course  ED Course as of 06/07/22 1153   Tue Jun 07, 2022   0906 EKG noted sinus rhythm.  92 bpm.  .  QRS of 102.  QTc 514.  Prolonged QT.  No acute ST elevation [SF]   1148 CBC and CCP unremarkable.  Troponin trend x2 within normal limits.  EKG unremarkable.  COVID test negative.  Chest x-ray unremarkable.  Patient does use chronic supplemental oxygen at home.  Concerns for necessity for further ACS  rule out.  Recommend admission for further work up and treatment.  Hospitalist team consulted and made aware of the patient.  Consults and orders placed per hospitalist request.  Patient was agreeable to admission plan.  Vitals stable on admission. [SF]      ED Course User Index  [SF] Denzel Padilla DO                      HEART Score: 5                            MDM    Final diagnoses:   Chest pain, unspecified type       ED Disposition  ED Disposition     ED Disposition   Decision to Admit    Condition   --    Comment   --             No follow-up provider specified.       Medication List      No changes were made to your prescriptions during this visit.          Denzel Padilla DO  06/07/22 1153

## 2022-06-07 NOTE — CONSULTS
Date of Admit: 6/7/2022  Date of Consult: 06/07/22  Provider, No Known        Chest pain, unspecified type      Assessment      1. Chest pain with negative troponins  2. ASCVD s/p CABG times 3/2017  3. Postoperative bradycardia.  Status status post pericardial window  4. Essential hypertension  5. Diabetes mellitus type 2  6. History of CVA  7. Obstructive sleep apnea on BiPAP  8. Hyperlipidemia  9. Morbid obesity      Recommendations     1. No further chest pain with no acute ECG changes and troponin is negative possibly the chest pain is related to severe hypertension however because of his history we will proceed with stress testing for assessment of ischemia  2. We will adjust medications for better blood pressure control  3. Continue the BiPAP therapy  4. Continue with the statin therapy  5. Patient needs to lose weight        Reason for consultation: Chest pain and history of coronary artery disease    Subjective       Subjective     History of Present Illness     Denzel Harding 62-year-old male with a past medical history significant for coronary artery disease status post CABG x3 with LAD endarterectomy, postop pericarditis with effusion status post left anterior thoracotomy and pericardial window in 2017, essential hypertension, diabetes mellitus type 2, history of CVA, COPD, obstructive sleep apnea and hyperlipidemia.  Patient states that he is on his way to a doctor's appointment this a.m. when he developed sudden onset chest pain in the middle of his chest.  Describes it as a pressure with a dull sensation to the middle of his chest.  He also had associated nausea and shortness of breath.  In the ED he was noted to be hypertensive with a systolic blood pressure in the 180s.  Troponin negative.  HAMLET in March 2021 showed normal LV function.  He does have a history of coronary artery disease status post three-vessel CABG in 2017 for which she had postop pericarditis status post pericardial window placement  in 2017. He follows with Dr. Jiménez at [facility] cardiology.     Cardiac risk factors:arteriosclerotic heart disease, diabetes mellitus, hypertension, Sedentary life style and Obesity    Last Echo: 3/2/2021  · Estimated left ventricular EF = 60% Left ventricular ejection fraction appears to be 56 - 60%. Left ventricular systolic function is normal.  · Saline test results are negative.  · The right atrial cavity is small in size.  · There is moderate calcification of the aortic valve mainly affecting the non-coronary cusp(s).    Last Stress: 1/28/2021  · Left ventricular ejection fraction is normal. (Calculated EF = 57%).  · Myocardial perfusion imaging indicates a normal myocardial perfusion study with no evidence of ischemia.  · Impressions are consistent with a low risk study.  · Findings consistent with a normal ECG stress test    Last Cath: 8/11/2017  IMPRESSION:  · Severe three-vessel coronary artery disease  · Preserved global and regional left ventricular systolic function  · Elevated left ventricular filling pressures consistent with diastolic heart failure.  · No significant left-sided valvular abnormalities appreciated     RECOMMENDATIONS:  · Coronary artery bypass surgery     ----------------------------------------------------------------------------------------------------------------------     Clinical History: This is a 57-year-old male patient with known coronary artery disease who is been having progressively worsening angina pectoris.  The patient underwent a vasodilator nuclear stress test which was high-risk positive for multivessel ischemia.  The patient's medical antianginal therapy was escalated and he is currently taking 3 antianginals including a beta blocker, a dihydropyridine calcium channel blocker and Ranexa.  The patient was intolerant to long-acting oral nitroglycerin due to severe headaches.  The patient continued to have progressively worsening class 3-4 angina despite  maximal antianginal therapy.  He was also beginning to have heart failure symptoms as well.  He was therefore referred for coronary angiography.     Access:  5\6 Tuvaluan slender arterial hemostasis sheath via a left radial approach; arterial hemostasis sheath removed and cardiac catheterization laboratory with application of transient radial band for patent hemostasis.     Procedure narrative:  The procedure was explained in detail to the patient including risks benefits and alternatives.  The patient was brought to the cardiac catheterization laboratory where his left wrist was prepped and draped in usual sterile fashion.  Access was obtained from the left radial artery and a arterial hemostasis sheath was placed via modified Seldinger technique.  The standard antispasm cocktail as well as 5000 units of unfractionated heparin was administered.  Retrograde catheterization was performed using 6 [place]uan JL4 and 6 Tu[place]uan JL 3.5 diagnostic catheters.  Hand injected angiograms was performed.  A pigtail catheter was placed in the mid cavity of the left ventricle.  Contrast ventriculogram was performed by power injection.  Pullback pressure across the aortic valve disclosed no gradient.           Angiographic Findings:  · Coronary circulation is right dominant  · Left main:  The left main coronary artery divides into the left anterior descending and circumflex coronary arteries.  The left main coronary artery has no significant disease.  · LAD: The left anterior descending is heavily calcified and gives rise to 3 diagonal branches.  The proximal left anterior descending has a 75% stenosis.  The distal left anterior descending has a long 80-90% stenosis.  The ostial and proximal portion of the first diagonal branch has a 75-80% stenosis.  The second and third diagonal branches have no significant disease.  · LCX: The circumflex coronary artery is nondominant for the posterior circulation.  The ostial portion of the second  obtuse marginal branch has a 7580% stenosis.  · RCA: The right coronary artery has an ostial greater than 75% stenosis.     Left ventricle: LVEF : > 60% ; no regional wall motion abnormalities.     Mitral regurgitation: None     Hemodynamic Findings:  Ao pressure: 130/75 mmHg  LVEDP: 20-25 mmHg     No aortic valve gradient to suggest aortic stenosis.        Past Medical History:   Diagnosis Date   • Anxiety    • Arthritis    • Asthma    • Brachial neuritis 01/19/2017   • Cellulitis     left arm and right leg    • Chest pain last 11/25/21    pt denies any since date of 11/25/21   • CHF (congestive heart failure) ([facility])     Patient reported history May 2020    • COPD (chronic obstructive pulmonary disease) ([facility])    • Coronary artery disease     Patient reported CABG , 3 vessel   • COVID-19 vaccine series completed     Pfizer, plus booster   • Depression    • Diabetes mellitus ([facility])     diagnosed in 1998, checks fsbg 3-4x/day   • Dizziness    • Edema    • GERD (gastroesophageal reflux disease)    • H/O chest x-ray 07/04/2015    Mild Left base atelectasis   • H/O echocardiogram 07/05/2015    Normal LVSF. EF of 60-65%. Grade 1 diastolic dysfunction of the LV myocardium. No evidence of pericardial effusion   • H/O exercise stress test    • Hearing loss     No use of hearing aids   • History of fracture     Reported 2 bones in left foot and a finger   • History of herniated intervertebral disc     History of left L5-S1 disc herniation   • History of nuclear stress test 2021    x2   • History of pneumonia    • History of pneumonia    • Hyperlipidemia    • Hypertension    • Impaired functional mobility, balance, gait, and endurance    • LOM (loss of memory) 1/19/2017   • Lower back pain     Right   • Measles    • MUSE (nonalcoholic steatohepatitis)    • Neuropathy     feet    • EDD treated with BiPAP     BiPAP HS - instructed to bring mask/machine DOS (setting 13 and 5)   • Oxygen dependent     2L NC   • Palpitations     • Pericardial effusion    • Poor dentition     Patient reported missing multiple teeth   • Renal disorder    • Seasonal allergies    • Seizure disorder (HCC)     focal seizures   • SOB (shortness of breath)     no report of any new onset or worsening of symptoms   • Staph infection     back after sugery at the incision site, on Rside face    • Stroke (HCC)     x5 most recent 03/2021, slight weakness in hands occasionaly    • Wears glasses     reading glasses     Past Surgical History:   Procedure Laterality Date   • BACK SURGERY     • CARDIAC CATHETERIZATION     • CARDIAC CATHETERIZATION N/A 8/11/2017    Procedure: Coronary angiography;  Surgeon: Dallas Kim MD;  Location: [facility] CATH INVASIVE LOCATION;  Service:    • CARDIAC CATHETERIZATION N/A 8/11/2017    Procedure: Left Heart Cath;  Surgeon: Dallas Kim MD;  Location: [facility] CATH INVASIVE LOCATION;  Service:    • CARDIAC CATHETERIZATION N/A 8/11/2017    Procedure: Left ventriculography;  Surgeon: Dallas Kim MD;  Location: [facility] CATH INVASIVE LOCATION;  Service:    • CARDIAC CATHETERIZATION      2002 x1 stent,  x1 stent   • CARDIOVASCULAR STRESS TEST  07/03/2017    WITH DR KIM AT [facility]   • CARPAL TUNNEL RELEASE Right    • CATARACT EXTRACTION W/ INTRAOCULAR LENS IMPLANT Right 6/12/2017    Procedure: CATARACT PHACO EXTRACTION WITH INTRAOCULAR LENS IMPLANT RIGHT ;  Surgeon: Natalie Cao MD;  Location: [facility] OR;  Service:    • CATARACT EXTRACTION W/ INTRAOCULAR LENS IMPLANT Left 7/10/2017    Procedure: CATARACT PHACO EXTRACTION WITH INTRAOCULAR LENS IMPLANT LEFT;  Surgeon: Natalie Cao MD;  Location: [facility] OR;  Service:    • CHOLECYSTECTOMY     • COLONOSCOPY     • COLONOSCOPY N/A 7/2/2020    Procedure: COLONOSCOPY;  Surgeon: Petra Crowell MD;  Location: [facility] ENDOSCOPY;  Service: Gastroenterology;  Laterality: N/A;  poor prep   • CORONARY ANGIOPLASTY WITH STENT PLACEMENT      X1-LAD 2002, Coronary 2019 (x2 total)   •  CORONARY ARTERY BYPASS GRAFT N/A 8/15/2017    Procedure: CORONARY ARTERY BYPASS GRAFTING x 3 UTILIZING THE LEFT INTERNAL MAMMARY ARTERY WITH ENDOSCOPIC VEIN HARVESTING OF THE RIGHT GREATER SAPHENOUS VEIN, HAMLET, LAD ENDARECTOMY;  Surgeon: London Damon MD;  Location:  [facility] OR;  Service:    • ENDOSCOPY     • EYE CAPSULOTOMY WITH LASER Right 11/25/2019    Procedure: EYE CAPSULOTOMY WITH LASER RIGHT;  Surgeon: Natalie Cao MD;  Location:  [facility] OR;  Service: Ophthalmology   • EYE CAPSULOTOMY WITH LASER Left 12/9/2019    Procedure: EYE CAPSULOTOMY WITH LASER LEFT;  Surgeon: Natalie Cao MD;  Location:  [facility] OR;  Service: Ophthalmology   • EYE SURGERY      cataract surgery both eyes   • INTERVENTIONAL RADIOLOGY PROCEDURE Bilateral 12/31/2019    Procedure: Carotid Cerebral Angiogram;  Surgeon: Damian Dubois MD;  Location:  [facility] CATH INVASIVE LOCATION;  Service: Interventional Radiology   • KNEE ARTHROSCOPY Bilateral    • KNEE ARTHROSCOPY Right 2010    Dr Lewis   • KNEE ARTHROSCOPY Left 2008    Dr Jameson   • KNEE MENISCECTOMY Right 10/15/2020    Procedure: Right knee arthroscopy with partial medial and lateral meniscectomy and three compartment chondroplasty.;  Surgeon: Suoth Lewis MD;  Location:  [facility] OR;  Service: Orthopedics;  Laterality: Right;   • MOUTH SURGERY      complete extraction    • NEUROPLASTY / TRANSPOSITION ULNAR NERVE AT ELBOW Left    • OTHER SURGICAL HISTORY      Foraminotomy and discectomy   • PERICARDIAL WINDOW N/A 8/25/2017    Procedure: PERICARDIAL WINDOW;  Surgeon: Freeman Phillips MD;  Location:  [facility] OR;  Service:    • VENTRAL/INCISIONAL HERNIA REPAIR N/A 1/12/2022    Procedure: INCISIONAL HERNIA REPAIR  WITH MESH;  Surgeon: Torres Kim MD;  Location:  [facility] OR;  Service: General;  Laterality: N/A;     Family History   Problem Relation Age of Onset   • Hypertension Mother    • Arthritis Mother    • Stomach cancer Mother    • Alcohol abuse Father    • Heart disease Other    •  Hypertension Other    • Other Other         Neurologic disorder   • Heart attack Other    • Parkinsonism Other    • Stroke Other    • Heart disease Other    • Hypertension Other    • Heart disease Other    • Stroke Other    • Hypertension Other    • Colon cancer Neg Hx      Social History     Tobacco Use   • Smoking status: Former Smoker     Packs/day: 1.00     Years: 10.00     Pack years: 10.00     Types: Cigarettes     Quit date: 1998     Years since quittin.4   • Smokeless tobacco: Former User     Types: Snuff     Quit date:    Vaping Use   • Vaping Use: Never used   Substance Use Topics   • Alcohol use: Yes     Comment: 3 drinks a year   • Drug use: No     Medications Prior to Admission   Medication Sig Dispense Refill Last Dose   • aspirin 81 MG EC tablet Take 81 mg by mouth Daily.   2022 at Unknown time   • atorvastatin (LIPITOR) 80 MG tablet Take 1 tablet by mouth Every Night. 90 tablet 1 2022 at Unknown time   • budesonide-formoterol (Symbicort) 80-4.5 MCG/ACT inhaler Inhale 2 puffs 2 (Two) Times a Day. Rinse mouth with water after use. 1 each 11 2022 at Unknown time   • clopidogrel (PLAVIX) 75 MG tablet Take 1 tablet by mouth Daily. 90 tablet 1 2022 at Unknown time   • coenzyme Q10 100 MG capsule Take 100 mg by mouth Daily.   2022 at Unknown time   • furosemide (LASIX) 40 MG tablet Take 1 tablet by mouth Daily As Needed (edema). 30 tablet 5 2022 at Unknown time   • gabapentin (NEURONTIN) 600 MG tablet Take 1 tablet by mouth 3 (Three) Times a Day. 270 tablet 3 2022 at Unknown time   • HYDROmorphone (DILAUDID) 2 MG tablet Take 2 mg by mouth Daily As Needed (Migraine).   Past Month at Unknown time   • HYDROmorphone (DILAUDID) 4 MG tablet Take 4 mg by mouth Daily As Needed (migraine).   Past Month at Unknown time   • Insulin Regular Human, Conc, (HumuLIN R U-500 KwikPen) 500 UNIT/ML solution pen-injector CONCENTRATED injection Inject 190 Units under the skin into the  appropriate area as directed Every Morning. Take 190 units in am, 180 units in afternoon and 180 units in the evening.   6/7/2022 at am   • Insulin Regular Human, Conc, (HumuLIN R) 500 UNIT/ML solution pen-injector CONCENTRATED injection Inject 180 Units under the skin into the appropriate area as directed Daily With Lunch. Take 180 units in am, 180 units in afternoon and 180 units in the evening.   6/6/2022 at Unknown time   • Insulin Regular Human, Conc, (HumuLIN R) 500 UNIT/ML solution pen-injector CONCENTRATED injection Inject 180 Units under the skin into the appropriate area as directed Daily With Dinner. Take 180 units in am, 180 units in afternoon and 180 units in the evening.   6/6/2022 at Unknown time   • isosorbide mononitrate (IMDUR) 60 MG 24 hr tablet Take 60 mg by mouth Daily.   6/7/2022 at Unknown time   • lamoTRIgine (LaMICtal) 100 MG tablet Take 100 mg by mouth Every Morning.   6/7/2022 at Unknown time   • lamoTRIgine (LaMICtal) 100 MG tablet Take 50 mg by mouth Every Evening.   6/6/2022 at Unknown time   • levETIRAcetam (KEPPRA) 1000 MG tablet Take 1,000 mg by mouth 2 (Two) Times a Day.   6/7/2022 at Unknown time   • levocetirizine (XYZAL) 5 MG tablet Take 1 tablet by mouth Every Evening. 90 tablet 3 6/6/2022 at Unknown time   • lisinopril (PRINIVIL,ZESTRIL) 5 MG tablet Take 5 mg by mouth every night at bedtime.   6/6/2022 at Unknown time   • metoprolol succinate XL (TOPROL-XL) 200 MG 24 hr tablet Take 1 tablet by mouth Daily 90 tablet 1 6/7/2022 at Unknown time   • omeprazole (priLOSEC) 20 MG capsule Take 1 capsule by mouth Daily. 90 capsule 1 6/7/2022 at Unknown time   • potassium chloride (K-DUR,KLOR-CON) 20 MEQ CR tablet Take 1 tablet by mouth Daily As Needed (take with lasix). 30 tablet 5 6/7/2022 at Unknown time   • spironolactone (ALDACTONE) 25 MG tablet Take 1 tablet by mouth Daily. 90 tablet 1 6/7/2022 at Unknown time   • Tiotropium Bromide Monohydrate (Spiriva Respimat) 1.25 MCG/ACT  aerosol solution inhaler Inhale 2 puffs Daily. 4 g 11 6/7/2022 at Unknown time   • tiZANidine (ZANAFLEX) 4 MG tablet Take 8 mg by mouth At Night As Needed for Muscle Spasms.   6/6/2022 at Unknown time   • vitamin C (ASCORBIC ACID) 500 MG tablet Take 500 mg by mouth Daily.   6/7/2022 at Unknown time   • vitamin D3 125 MCG (5000 UT) capsule capsule Take 5,000 Units by mouth Daily.   6/7/2022 at Unknown time   • vitamin E 400 UNIT capsule Take 1 capsule by mouth 2 (two) times a day. 60 capsule 5 6/7/2022 at Unknown time   • Vortioxetine HBr (Trintellix) 20 MG tablet Take 20 mg by mouth Daily.   6/7/2022 at Unknown time   • albuterol sulfate HFA (Ventolin HFA) 108 (90 Base) MCG/ACT inhaler Inhale 2 puffs Every 4 (Four) Hours As Needed for Wheezing or Shortness of Air. 8 g 5 Unknown at Unknown time   • Dupilumab (Dupixent) 300 MG/2ML solution pen-injector Inject 300 mg under the skin into the appropriate area as directed Every 14 (Fourteen) Days.   6/5/2022   • Semaglutide, 1 MG/DOSE, (Ozempic, 1 MG/DOSE,) 2 MG/1.5ML solution pen-injector Inject 1 mg under the skin into the appropriate area as directed 1 (One) Time Per Week. 9 mL 3 6/4/2022   • SUMAtriptan (IMITREX) 50 MG tablet Take 50 mg by mouth Every 2 (Two) Hours As Needed for Migraine. Take one tablet at onset of headache. May repeat dose one time in 2 hours if headache not relieved.   Unknown at Unknown time     Allergies:  Ajovy [fremanezumab-vfrm], Sulfa antibiotics, Invokana [canagliflozin], Codeine, and Morphine    Review of Systems   Constitutional: Negative for chills, diaphoresis, fatigue and fever.   HENT: Negative for congestion and trouble swallowing.    Eyes: Negative for photophobia and visual disturbance.   Respiratory: Positive for chest tightness and shortness of breath.    Cardiovascular: Positive for chest pain. Negative for palpitations.   Gastrointestinal: Positive for nausea. Negative for abdominal pain and vomiting.   Endocrine: Negative for  polyphagia and polyuria.   Genitourinary: Negative for dysuria and hematuria.   Musculoskeletal: Negative for back pain and neck pain.   Skin: Negative for rash and wound.   Allergic/Immunologic: Negative for food allergies and immunocompromised state.   Neurological: Negative for dizziness and weakness.   Hematological: Negative for adenopathy. Does not bruise/bleed easily.   Psychiatric/Behavioral: Negative for confusion and suicidal ideas.       Objective       Objective      Vital Signs  Temp:  [98.4 °F (36.9 °C)-98.5 °F (36.9 °C)] 98.4 °F (36.9 °C)  Heart Rate:  [82-87] 82  Resp:  [16-24] 24  BP: (134-189)/(72-98) 142/81  Vital Signs (last 72 hrs)       06/04 0700  06/05 0659 06/05 0700  06/06 0659 06/06 0700  06/07 0659 06/07 0700  06/07 1206   Most Recent      Temp (°F)         98.5     98.5 (36.9) 06/07 0906    Heart Rate       83 -  87     83 06/07 1148    Resp       18 -  20     18 06/07 1148    BP       150/82 -  189/91     154/84 06/07 1148    SpO2 (%)       98 -  100     99 06/07 1148        Body mass index is 44.19 kg/m².  Documented weights    06/07/22 0906 06/07/22 1358   Weight: 132 kg (290 lb) (!) 140 kg (308 lb)          No intake or output data in the 24 hours ending 06/07/22 1501  Physical Exam  Constitutional:       General: He is not in acute distress.     Appearance: Normal appearance. He is well-developed.   HENT:      Head: Normocephalic and atraumatic.      Mouth/Throat:      Mouth: Mucous membranes are moist.   Eyes:      Extraocular Movements: Extraocular movements intact.      Pupils: Pupils are equal, round, and reactive to light.   Neck:      Vascular: No JVD.   Cardiovascular:      Rate and Rhythm: Normal rate and regular rhythm.      Heart sounds: Normal heart sounds. No murmur heard.    No S3 or S4 sounds.   Pulmonary:      Effort: Pulmonary effort is normal. No respiratory distress.      Breath sounds: Normal breath sounds. No wheezing.   Abdominal:      General: Bowel sounds are  normal. There is no distension.      Palpations: Abdomen is soft. There is no hepatomegaly.      Tenderness: There is no abdominal tenderness.   Musculoskeletal:         General: Normal range of motion.      Cervical back: Normal range of motion and neck supple.   Skin:     General: Skin is warm and dry.      Coloration: Skin is not jaundiced or pale.   Neurological:      General: No focal deficit present.      Mental Status: He is alert and oriented to person, place, and time. Mental status is at baseline.   Psychiatric:         Mood and Affect: Mood normal.         Behavior: Behavior normal.         Thought Content: Thought content normal.         Judgment: Judgment normal.         Results review     Results Review:    I reviewed the patient's new clinical results.  Results from last 7 days   Lab Units 06/07/22  1118 06/07/22  0919   TROPONIN T ng/mL <0.010 <0.010     Results from last 7 days   Lab Units 06/07/22  0919   WBC 10*3/mm3 8.90   HEMOGLOBIN g/dL 13.1   PLATELETS 10*3/mm3 183     Results from last 7 days   Lab Units 06/07/22  0919   SODIUM mmol/L 139   POTASSIUM mmol/L 3.6   CHLORIDE mmol/L 100   CO2 mmol/L 27.7   BUN mg/dL 8   CREATININE mg/dL 0.79   CALCIUM mg/dL 9.4   GLUCOSE mg/dL 118*   ALT (SGPT) U/L 40   AST (SGOT) U/L 45*     Lab Results   Component Value Date    INR 1.05 02/27/2021    INR 0.98 06/17/2020    INR 1.01 10/15/2019    INR 0.97 01/28/2018    INR 1.1 01/28/2018    INR 1.1 12/17/2017    INR 0.97 08/16/2017     Lab Results   Component Value Date    MG 1.9 06/07/2022    MG 1.8 03/19/2022    MG 1.8 09/25/2021     Lab Results   Component Value Date    TSH 0.535 08/10/2021    CHLPL 123 07/01/2021    TRIG 94 03/22/2022    HDL 31 (L) 03/22/2022    LDL 50 03/22/2022      Lab Results   Component Value Date    PROBNP 35.1 03/19/2022    PROBNP 103.8 08/09/2021    PROBNP 14.7 04/29/2021       ECG         ECG/EMG Results (last 24 hours)     Procedure Component Value Units Date/Time    ECG 12 Lead  [[id number]] Collected: 06/07/22 0903     Updated: 06/07/22 0904          Imaging Results (Last 72 Hours)     Procedure Component Value Units Date/Time    XR Chest 1 View [[id number]] Collected: 06/07/22 0933     Updated: 06/07/22 0937    Narrative:      XR CHEST 1 VW-     CLINICAL INDICATION: Chest pain protocol        COMPARISON: None available      TECHNIQUE: Single frontal view of the chest.     FINDINGS:      LUNGS: Lungs are adequately aerated.      HEART AND MEDIASTINUM: Heart and mediastinal contours are unremarkable        SKELETON: Bony and soft tissue structures are unremarkable.             Impression:      No radiographic evidence of acute cardiac or pulmonary disease.     This report was finalized on 6/7/2022 9:34 AM by Dr. Abdifatah Shaffer MD.             I have discussed my impression and recommendations with the patient and family.    Thank you very much for asking us to be involved in this patient's care.  We will follow along with you.      Electronically signed by ANTONIO Coleman, 06/07/22, 12:07 PM EDT.  Electronically signed by Gurinder Walden MD, 06/07/22, 3:49 PM EDT.    Please note that portions of this note were completed with a voice recognition program.

## 2022-06-08 NOTE — PLAN OF CARE
Goal Outcome Evaluation:   Pt alert and oriented x 4. VSS and afebrile during this shift. Pt remains on 2L NC. No other changes during this shift. Pt denies any chest pain at this time. Safety maintained.

## 2022-06-08 NOTE — PROGRESS NOTES
LOS: 1 day     Name: Denzel Harding  Age/Sex: 62 y.o. male  :  1960        PCP: Isaura Godoy MD  REF: No Known Provider    Principal Problem:    Chest pain, unspecified type      Reason for follow-up: Chest pain and history of coronary artery disease    Subjective     Subjective     Denzel Harding 62-year-old male with a past medical history significant for coronary artery disease status post CABG x3 with LAD endarterectomy, postop pericarditis with effusion status post left anterior thoracotomy and pericardial window in 2017, essential hypertension, diabetes mellitus type 2, history of CVA, COPD, obstructive sleep apnea and hyperlipidemia.  Patient states that he is on his way to a doctor's appointment this a.m. when he developed sudden onset chest pain in the middle of his chest.      Interval History: Patient scheduled for nuclear stress test today. Denies any chest pain. Blood pressure improved but still hypertensive.     Vital Signs  Temp:  [97.9 °F (36.6 °C)-98.6 °F (37 °C)] 97.9 °F (36.6 °C)  Heart Rate:  [82-99] 90  Resp:  [13-24] 20  BP: (110-178)/(65-92) 146/76     Vital Signs (last 72 hrs)       06/05 0700  06/06 0659 06/06 0700  / 0659 / 0700  /08 0659 /08 0700  / 0923   Most Recent      Temp (°F)     98.4 -  98.6      97.9     97.9 (36.6) /08 0800    Heart Rate     82 -  99       90 0628    Resp     13 -  24       20 / 0628    BP     110/65 -  189/91       146/76 /08 0600    SpO2 (%)     96 -  100       99 / 0628        Documented weights    22 0906 22 1358 22 0400   Weight: 132 kg (290 lb) (!) 140 kg (308 lb) (!) 139 kg (306 lb 11.2 oz)      Body mass index is 44.01 kg/m².    Intake/Output Summary (Last 24 hours) at 2022 09  Last data filed at 2022 0900  Gross per 24 hour   Intake --   Output 2200 ml   Net -2200 ml     Objective    Objective       Physical Exam:     General Appearance:    Alert, cooperative, in no acute  distress   Head:    Normocephalic, without obvious abnormality, atraumatic   Eyes:            Conjunctivae and sclerae normal, no   icterus, no pallor, corneas clear.   Neck:   No adenopathy, supple, trachea midline, no thyromegaly, no   carotid bruit, no JVD   Lungs:     Clear to auscultation,respirations regular, even and                  unlabored    Heart:    Regular rhythm and normal rate, normal S1 and S2, no            murmur, no gallop, no rub, no click   Chest Wall:    No abnormalities observed   Abdomen:     Normal bowel sounds, no masses, no organomegaly, soft        nontender, nondistended, no guarding, no rebound                tenderness   Extremities:   Moves all extremities well, no edema, no cyanosis, no             redness   Pulses:   Pulses palpable and equal bilaterally   Skin:   No bleeding, bruising or rash       Neurologic:   Alert and oriented      Results review       Results Review:   Results from last 7 days   Lab Units 06/08/22  0203 06/07/22  0919   WBC 10*3/mm3 10.08 8.90   HEMOGLOBIN g/dL 12.4* 13.1   PLATELETS 10*3/mm3 175 183     Results from last 7 days   Lab Units 06/08/22  0203 06/07/22  0919   SODIUM mmol/L 137 139   POTASSIUM mmol/L 4.2 3.6   CHLORIDE mmol/L 103 100   CO2 mmol/L 24.8 27.7   BUN mg/dL 11 8   CREATININE mg/dL 0.66* 0.79   CALCIUM mg/dL 9.1 9.4   GLUCOSE mg/dL 242* 118*   ALT (SGPT) U/L 37 40   AST (SGOT) U/L 30 45*     Results from last 7 days   Lab Units 06/08/22  0203 06/07/22 2019 06/07/22  1420 06/07/22  1118 06/07/22  0919   TROPONIN T ng/mL <0.010 <0.010 <0.010 <0.010 <0.010     Lab Results   Component Value Date    INR 1.05 02/27/2021    INR 0.98 06/17/2020    INR 1.01 10/15/2019    INR 0.97 01/28/2018    INR 1.1 01/28/2018    INR 1.1 12/17/2017    INR 0.97 08/16/2017     Lab Results   Component Value Date    MG 2.1 06/08/2022    MG 1.9 06/07/2022    MG 1.8 03/19/2022     Lab Results   Component Value Date    TSH 0.656 06/08/2022    CHLPL 123 07/01/2021     TRIG 100 06/08/2022    HDL 29 (L) 06/08/2022    LDL 49 06/08/2022      Imaging Results (Last 48 Hours)     Procedure Component Value Units Date/Time    XR Chest 1 View [[id number]] Collected: 06/07/22 0933     Updated: 06/07/22 0937    Narrative:      XR CHEST 1 VW-     CLINICAL INDICATION: Chest pain protocol        COMPARISON: None available      TECHNIQUE: Single frontal view of the chest.     FINDINGS:      LUNGS: Lungs are adequately aerated.      HEART AND MEDIASTINUM: Heart and mediastinal contours are unremarkable        SKELETON: Bony and soft tissue structures are unremarkable.             Impression:      No radiographic evidence of acute cardiac or pulmonary disease.     This report was finalized on 6/7/2022 9:34 AM by Dr. Abdifatah Shaffer MD.           Lab Results   Component Value Date    BNP 10.0 06/09/2018     Echo   Results for orders placed during the hospital encounter of 02/27/21    Adult Transesophageal Echo (HAMLET) W/ Cont if Necessary Per Protocol    Interpretation Summary  · Estimated left ventricular EF = 60% Left ventricular ejection fraction appears to be 56 - 60%. Left ventricular systolic function is normal.  · Saline test results are negative.  · The right atrial cavity is small in size.  · There is moderate calcification of the aortic valve mainly affecting the non-coronary cusp(s).     I reviewed the patient's new clinical results.    Telemetry: NSR 80-90 bpm      Medication Review:   vitamin C, 500 mg, Oral, Daily  aspirin, 81 mg, Oral, Daily  atorvastatin, 80 mg, Oral, Daily  budesonide-formoterol, 2 puff, Inhalation, BID - RT  cetirizine, 10 mg, Oral, Nightly  clopidogrel, 75 mg, Oral, Daily  enoxaparin, 40 mg, Subcutaneous, Q24H  gabapentin, 600 mg, Oral, Q8H  Insulin Regular Human (Conc), 150 Units, Subcutaneous, QAM AC  Insulin Regular Human (Conc), 150 Units, Subcutaneous, Daily Before Lunch  Insulin Regular Human (Conc), 150 Units, Subcutaneous, Daily Before Supper  labetalol, 10  mg, Intravenous, Once  lamoTRIgine, 100 mg, Oral, QAM  lamoTRIgine, 50 mg, Oral, Q PM  levETIRAcetam, 1,000 mg, Oral, BID  lisinopril, 5 mg, Oral, Nightly  metoprolol succinate XL, 200 mg, Oral, Q24H  nitroglycerin, 1 inch, Topical, Q6H  pantoprazole, 40 mg, Oral, QAM  sodium chloride, 10 mL, Intravenous, Q12H  spironolactone, 25 mg, Oral, Daily  vitamin D3, 5,000 Units, Oral, Daily  vitamin E, 400 Units, Oral, Daily  Vortioxetine HBr, 20 mg, Oral, Daily             Assessment      Assessment:    Chest pain with negative troponins  ASCVD s/p CABG times 3/2017  Postoperative bradycardia.  Status status post pericardial window  Essential hypertension  Diabetes mellitus type 2  History of CVA  Obstructive sleep apnea on BiPAP  Hyperlipidemia  Morbid obesity          Plan     Recommendations:  Patient has no further chest pain we will proceed with stress testing today for assessment of ischemia  Blood pressure still not well controlled but is better, will increase dose of lisinopril to 20 mg/day    I discussed the patient's findings and my recommendations with patient and family      Electronically signed by ANTONIO Coleman, 06/08/22, 9:23 AM EDT.  Electronically signed by Gurinder Walden MD, 06/08/22, 11:01 AM EDT.    Please note that portions of this note were completed with a voice recognition program.

## 2022-06-08 NOTE — PAYOR COMM NOTE
"CONTACT:  MARY MURPHY MSN, APRN  UTILIZATION MANAGEMENT DEPT.  [facility]  [address]  [place], [address]  PHONE:  [phone]  FAX: [phone]    CLINICAL FOR INPATIENT AUTHORIZATION REQUEST    REFERENCE # [id number]    Denzel Hassan (62 y.o. Male)            Date of Birth  1960     Social Security Number       Address  [address] [facility] [address]     Home Phone  [phone]     MRN  [id number]       [facility]     Marital Status                              Admission Date  6/7/22     Admission Type  Emergency     Admitting Provider  Denys Johns MD     Attending Provider  Denys Johns MD     Department, Room/Bed  [facility] PROGRESS CARE, P207/1P       Discharge Date       Discharge Disposition       Discharge Destination                             Attending Provider: Denys Johns MD   Allergies: Ajovy [Fremanezumab-vfrm], Sulfa Antibiotics, Invokana [Canagliflozin], Codeine, Morphine     Isolation: None  Infection: None  Code Status: CPR  Advance Care Planning Activity     Ht: 177.8 cm (70\")  Wt: 139 kg (306 lb 11.2 oz)     Admission Cmt: None  Principal Problem: Chest pain, unspecified type [R07.9]             Active Insurance as of 6/7/2022      Primary Coverage    Payor Plan Insurance Group Employer/Plan Group   ANTHEM MEDICARE REPLACEMENT ANTHEM MEDICARE ADVANTAGE [id number]    Payor Plan Address Payor Plan Phone Number Payor Plan Fax Number Effective Dates   [address] [phone]  1/1/2016 - None Entered   [facility] [id number]      Subscriber Name Subscriber Birth Date Member ID      DENZEL HASSAN 1960 [id number]          Secondary Coverage    Payor Plan Insurance Group Employer/Plan Group   [id number]     Payor Plan Address Payor Plan Phone Number Payor Plan Fax Number Effective Dates   [address]   7/1/2021 - None Entered   [facility] [id number]      Subscriber Name Subscriber Birth " Date Member ID      MIKY HASSAN 1960 [id number]             Emergency Contacts     (Rel.) Home Phone Work Phone Mobile Phone   Carol Hassan (Spouse) [phone] -- [phone]   MANN HASSAN (Daughter) [phone] -- --            History & Physical    Carole Jhaveri PA at 06/07/22 1228      Attestation signed by Denys Johns MD at 06/07/22 2730 (Updated)   I have reviewed this documentation and agree.  Patient still states he is having some anterior chest discomfort but troponins have been repeatedly negative and EKG to my reviewing does not show any active ischemia.  I discussed with cardiology  and plans for stress test tomorrow.  Blood pressure was quite elevated on admission and some of this pain may have been related to this.  Pain is very typical for myocardial ischemia started as nausea then went into pain in his chest arms and neck.  He does think there was some improvement with nitroglycerin although did not completely resolve his pain.  He states he does have reflux and had some solid food dysphagia at times but he had an EGD done recently at [facility] that he was told it was normal.  I confirmed he takes U5 100 insulin 3 times a day.  He does see endocrine at , he states he takes 190 units in the morning with 180 with lunch and supper, I am awaiting home medication reconciliation.  He does have a freestyle emily on his arm, current glucose 159.  Continue Lipitor aspirin nitroglycerin paste, on Lovenox for DVT prophylaxis.  Awaiting home medication list.  I have added D-dimer, consider CT scan depending on results.  Patient is status post CVA in the past that required tPA and he went to the emergency of [place], his glucose was out of control at that time and that is where they as an endocrine group put him on this high dose U 500.                      [facility] HOSPITALIST HISTORY AND PHYSICAL    Patient Identification:  Name:   Denzel Harding  Age:  62 y.o.  Sex:  male  :  1960  MRN:  [id number]   Visit Number:  [id number]  Admit Date: 2022   Primary Care Physician:  Isaura Godoy MD     2022  8:58 AM    Subjective    Chief complaint:   Chief Complaint  Patient presents with   Chest Pain   Shortness of Breath    History of presenting illness:   Denzel Harding is a 62 y.o. male with past medical history significant for CAD s/p remote stenting x2 in  followed by CABG x 3V in 2017.  It appears he developed postop pericarditis and required a pericardial window. He believes he has had 1 stent since his CABG surgery in 2017, possibly about 2 years after. Other past medical history is significant for COPD, CHF, CVA, past history of tobacco abuse, morbid obesity per BMI, HTN, HLD, EDD, insulin dependent type II DM and anxiety/depression.     He presented to the emergency department of [facility] on 2022 with complaints of chest pain and shortness of breath.  He reports that approximately 3 AM this morning he woke up with significant nausea.  He did not experience any vomiting, but felt like he might.  He stayed up most of the rest of the night.  At approximately 8:30 AM while riding in the car, he developed some centralized chest pressure associated with shortness of breath.  The pressure was midsternal and eventually did radiate into both arms and up into the left neck and shoulder.  He labels it as a 9 out of 10 on a pain scale.  He had associated diaphoresis.  He came to the ED for further evaluation.  He denies any other recent chest pains or new exertional dyspnea.  He uses 2 LNC continuously on BiPAP nightly.  He follows with a Dr. London Jiménez at [facility] in [place] for his cardiac care.  He has a past history tobacco abuse, but quit in 2017.    Upon arrival to the ED, vitals were /98, respirations 20, heart rate 87, SPO2 98% on 2L NC.  Afebrile.  Troponin negative x2.  WBC count is  within normal limits.  Admission testing for COVID-19 is negative.  CXR shows no radiographic evidence of acute cardiac or pulmonary disease.  EKG shows a sinus rhythm at 92 bpm.  QTc 504 ms.    In the ED, he received treatment with aspirin 324 mg x 1, dexamethasone 8 mg x 1, diphenhydramine 12.5 mg IV x1, nitroglycerin paste 0.5 inches applied to the chest.  He reports some improvement in his chest discomfort with the nitroglycerin paste, but continues to have some chest pain which is currently rated at a 5/10.  Patient has been admitted to the progressive care unit of [facility] for further evaluation and therapy.   ---------------------------------------------------------------------------------------------------------------------   Review of Systems   Constitutional: Negative for chills and fever.   HENT: Negative for congestion, mouth sores, nosebleeds and postnasal drip.    Respiratory: Positive for shortness of breath. Negative for cough and wheezing.    Cardiovascular: Positive for chest pain. Negative for leg swelling.   Gastrointestinal: Positive for nausea. Negative for abdominal pain, constipation, diarrhea and vomiting.   Genitourinary: Negative for difficulty urinating and dysuria.   Musculoskeletal: Positive for arthralgias, back pain, gait problem and neck pain.        Chronic back/neck pain with history of OA and DDD.    Skin: Negative for color change, pallor, rash and wound.   Neurological: Negative for dizziness, syncope, speech difficulty and weakness.   Psychiatric/Behavioral: Negative for agitation, behavioral problems and confusion.      ---------------------------------------------------------------------------------------------------------------------   Past Medical History:  Diagnosis Date   Anxiety    Arthritis    Asthma    Brachial neuritis 01/19/2017   Cellulitis    left arm and right leg    Chest pain last 11/25/21   pt denies any since date of 11/25/21   CHF (congestive  heart failure) ([facility])    Patient reported history May 2020    COPD (chronic obstructive pulmonary disease) ([facility])    Coronary artery disease    Patient reported CABG , 3 vessel   COVID-19 vaccine series completed    Pfizer, plus booster   Depression    Diabetes mellitus ([facility])    diagnosed in 1998, checks fsbg 3-4x/day   Dizziness    Edema    GERD (gastroesophageal reflux disease)    H/O chest x-ray 07/04/2015   Mild Left base atelectasis   H/O echocardiogram 07/05/2015   Normal LVSF. EF of 60-65%. Grade 1 diastolic dysfunction of the LV myocardium. No evidence of pericardial effusion   H/O exercise stress test    Hearing loss    No use of hearing aids   History of fracture    Reported 2 bones in left foot and a finger   History of herniated intervertebral disc    History of left L5-S1 disc herniation   History of nuclear stress test 2021   x2   History of pneumonia    History of pneumonia    Hyperlipidemia    Hypertension    Impaired functional mobility, balance, gait, and endurance    LOM (loss of memory) 1/19/2017   Lower back pain    Right   Measles    MUSE (nonalcoholic steatohepatitis)    Neuropathy    feet    EDD treated with BiPAP    BiPAP HS - instructed to bring mask/machine DOS (setting 13 and 5)   Oxygen dependent    2L NC   Palpitations    Pericardial effusion    Poor dentition    Patient reported missing multiple teeth   Renal disorder    Seasonal allergies    Seizure disorder ([facility])    focal seizures   SOB (shortness of breath)    no report of any new onset or worsening of symptoms   Staph infection    back after sugery at the incision site, on Rside face    Stroke ([facility])    x5 most recent 03/2021, slight weakness in hands occasionaly    Wears glasses    reading glasses    Past Surgical History:  Procedure Laterality Date   BACK SURGERY     CARDIAC CATHETERIZATION     CARDIAC CATHETERIZATION N/A 8/11/2017   Procedure: Coronary angiography;  Surgeon: Dallas Kim MD;  Location: [facility] CATH  INVASIVE LOCATION;  Service:    CARDIAC CATHETERIZATION N/A 8/11/2017   Procedure: Left Heart Cath;  Surgeon: Dallas Hernandez MD;  Location: [facility] CATH INVASIVE LOCATION;  Service:    CARDIAC CATHETERIZATION N/A 8/11/2017   Procedure: Left ventriculography;  Surgeon: Dallas Hernandez MD;  Location: [facility] CATH INVASIVE LOCATION;  Service:    CARDIAC CATHETERIZATION     2002 x1 stent,  x1 stent   CARDIOVASCULAR STRESS TEST  07/03/2017   WITH DR HERNANDEZ AT [facility]   CARPAL TUNNEL RELEASE Right    CATARACT EXTRACTION W/ INTRAOCULAR LENS IMPLANT Right 6/12/2017   Procedure: CATARACT PHACO EXTRACTION WITH INTRAOCULAR LENS IMPLANT RIGHT ;  Surgeon: Natalie Cao MD;  Location: [facility] OR;  Service:    CATARACT EXTRACTION W/ INTRAOCULAR LENS IMPLANT Left 7/10/2017   Procedure: CATARACT PHACO EXTRACTION WITH INTRAOCULAR LENS IMPLANT LEFT;  Surgeon: Natalie Cao MD;  Location: [facility] OR;  Service:    CHOLECYSTECTOMY     COLONOSCOPY     COLONOSCOPY N/A 7/2/2020   Procedure: COLONOSCOPY;  Surgeon: Petra Crowell MD;  Location: [facility] ENDOSCOPY;  Service: Gastroenterology;  Laterality: N/A;  poor prep   CORONARY ANGIOPLASTY WITH STENT PLACEMENT     X1-LAD 2002, Coronary 2019 (x2 total)   CORONARY ARTERY BYPASS GRAFT N/A 8/15/2017   Procedure: CORONARY ARTERY BYPASS GRAFTING x 3 UTILIZING THE LEFT INTERNAL MAMMARY ARTERY WITH ENDOSCOPIC VEIN HARVESTING OF THE RIGHT GREATER SAPHENOUS VEIN, HAMLET, LAD ENDARECTOMY;  Surgeon: London Damon MD;  Location: [facility] OR;  Service:    ENDOSCOPY     EYE CAPSULOTOMY WITH LASER Right 11/25/2019   Procedure: EYE CAPSULOTOMY WITH LASER RIGHT;  Surgeon: Natalie Cao MD;  Location: [facility] OR;  Service: Ophthalmology   EYE CAPSULOTOMY WITH LASER Left 12/9/2019   Procedure: EYE CAPSULOTOMY WITH LASER LEFT;  Surgeon: Natalie Cao MD;  Location: [facility] OR;  Service: Ophthalmology   EYE SURGERY     cataract surgery both eyes   INTERVENTIONAL RADIOLOGY  PROCEDURE Bilateral 2019   Procedure: Carotid Cerebral Angiogram;  Surgeon: Damian Dubois MD;  Location:  [facility] CATH INVASIVE LOCATION;  Service: Interventional Radiology   KNEE ARTHROSCOPY Bilateral    KNEE ARTHROSCOPY Right    Dr Lewis   KNEE ARTHROSCOPY Left    Dr Jameson   KNEE MENISCECTOMY Right 10/15/2020   Procedure: Right knee arthroscopy with partial medial and lateral meniscectomy and three compartment chondroplasty.;  Surgeon: South Lewis MD;  Location: [facility] OR;  Service: Orthopedics;  Laterality: Right;   MOUTH SURGERY     complete extraction    NEUROPLASTY / TRANSPOSITION ULNAR NERVE AT ELBOW Left    OTHER SURGICAL HISTORY     Foraminotomy and discectomy   PERICARDIAL WINDOW N/A 2017   Procedure: PERICARDIAL WINDOW;  Surgeon: Fereman Phillips MD;  Location:  [facility] OR;  Service:    VENTRAL/INCISIONAL HERNIA REPAIR N/A 2022   Procedure: INCISIONAL HERNIA REPAIR  WITH MESH;  Surgeon: Torres Kim MD;  Location:  [facility] OR;  Service: General;  Laterality: N/A;    Family History  Problem Relation Age of Onset   Hypertension Mother    Arthritis Mother    Stomach cancer Mother    Alcohol abuse Father    Heart disease Other    Hypertension Other    Other Other        Neurologic disorder   Heart attack Other    Parkinsonism Other    Stroke Other    Heart disease Other    Hypertension Other    Heart disease Other    Stroke Other    Hypertension Other    Colon cancer Neg Hx     Social History    Socioeconomic History   Marital status:    Number of children: 1  Tobacco Use   Smoking status: Former Smoker    Packs/day: 1.00    Years: 10.00    Pack years: 10.00    Types: Cigarettes    Quit date: 1998    Years since quittin.4   Smokeless tobacco: Former User    Types: Snuff    Quit date:   Vaping Use   Vaping Use: Never used  Substance and Sexual Activity   Alcohol use: Yes    Comment: 3 drinks a year   Drug use: No   Sexual  activity: Defer    ---------------------------------------------------------------------------------------------------------------------   Allergies:  Ajovy [fremanezumab-vfrm], Sulfa antibiotics, Invokana [canagliflozin], Codeine, and Morphine  ---------------------------------------------------------------------------------------------------------------------   Prior to Admission Medications    Prescriptions Last Dose Informant Patient Reported? Taking?   albuterol sulfate HFA (Ventolin HFA) 108 (90 Base) MCG/ACT inhaler  Self No No   Inhale 2 puffs Every 4 (Four) Hours As Needed for Wheezing or Shortness of Air.   aspirin  MG tablet  Self No No   Take 1 tablet by mouth Daily.   Patient taking differently:  Take 325 mg by mouth Daily. PT not taking due to surgery.   atorvastatin (LIPITOR) 80 MG tablet   No No   Take 1 tablet by mouth Every Night.   B-D ULTRAFINE III SHORT PEN 31G X 8 MM misc  Self Yes No   2 (Two) Times a Day.   budesonide-formoterol (Symbicort) 80-4.5 MCG/ACT inhaler   No No   Inhale 2 puffs 2 (Two) Times a Day. Rinse mouth with water after use.   clopidogrel (PLAVIX) 75 MG tablet   No No   Take 1 tablet by mouth Daily.   coenzyme Q10 100 MG capsule  Self Yes No   Take 100 mg by mouth Daily.   Continuous Blood Gluc Sensor (FreeStyle Radha 2 Sensor) misc   Yes No   docusate sodium (Colace) 100 MG capsule  Self No No   Take 1 capsule by mouth 2 (Two) Times a Day As Needed for Constipation.   Dupixent 300 MG/2ML solution prefilled syringe   No No   INJECT 300 MG SUBCUTANEOUSLY EVERY OTHER WEEK   furosemide (LASIX) 40 MG tablet   No No   Take 1 tablet by mouth Daily As Needed (edema).   gabapentin (NEURONTIN) 600 MG tablet  Self No No   Take 1 tablet by mouth 3 (Three) Times a Day.   Insulin Regular Human, Conc, (HumuLIN R U-500 KwikPen) 500 UNIT/ML solution pen-injector CONCENTRATED injection  Self Yes No   Inject  under the skin into the appropriate area as directed 3 (Three) Times a Day  Before Meals. Take 180 units in am, 180 units in afternoon and evening.   ipratropium-albuterol (DUO-NEB) 0.5-2.5 mg/3 ml nebulizer  Self No No   Take 3 mL by nebulization 4 (Four) Times a Day As Needed for Wheezing or Shortness of Air.   isosorbide mononitrate (IMDUR) 60 MG 24 hr tablet  Self No No   Take 1 tablet by mouth Daily   lamoTRIgine (LaMICtal) 100 MG tablet  Self Yes No   Take 100 mg by mouth 2 (Two) Times a Day.   levETIRAcetam (KEPPRA) 1000 MG tablet  Self Yes No   Take 1,000 mg by mouth 2 (Two) Times a Day.   levocetirizine (XYZAL) 5 MG tablet  Self No No   Take 1 tablet by mouth Every Evening.   levoFLOXacin (LEVAQUIN) 500 MG tablet   No No   Take 1 tablet by mouth Daily.   lisinopril (PRINIVIL,ZESTRIL) 5 MG tablet   No No   Take 1 tablet by mouth Daily.   metoprolol succinate XL (TOPROL-XL) 200 MG 24 hr tablet   No No   Take 1 tablet by mouth Daily   mupirocin (Bactroban) 2 % cream   No No   Apply 1 application topically to the appropriate area as directed 3 (Three) Times a Day.   Nebulizer device  Self No No   1 Device Take As Directed.   nitroglycerin (NITROLINGUAL) 0.4 MG/SPRAY spray  Self No No   Place 1 spray under the tongue Every 5 (Five) Minutes As Needed for Chest Pain.   nortriptyline (PAMELOR) 25 MG capsule   Yes No   Take 25 mg by mouth.   nortriptyline (PAMELOR) 50 MG capsule  Self Yes No   Take 50 mg by mouth Every Night.   omeprazole (priLOSEC) 20 MG capsule   No No   Take 1 capsule by mouth Daily.   potassium chloride (K-DUR,KLOR-CON) 20 MEQ CR tablet   No No   Take 1 tablet by mouth Daily As Needed (take with lasix).   predniSONE (DELTASONE) 20 MG tablet   No No   Take 1 tablet by mouth 2 (Two) Times a Day.   promethazine (PHENERGAN) 25 MG tablet   No No   Take 1 tablet by mouth At Night As Needed for Nausea or Vomiting.   Semaglutide, 1 MG/DOSE, (Ozempic, 1 MG/DOSE,) 2 MG/1.5ML solution pen-injector  Self No No   Inject 1 mg under the skin into the appropriate area as directed 1  (One) Time Per Week.   spironolactone (ALDACTONE) 25 MG tablet   No No   Take 1 tablet by mouth Daily.   Tiotropium Bromide Monohydrate (Spiriva Respimat) 1.25 MCG/ACT aerosol solution inhaler   No No   Inhale 2 puffs Daily.   tiZANidine (ZANAFLEX) 4 MG tablet   No No   Take 1 tablet by mouth At Night As Needed for Muscle Spasms.   Trintellix 20 MG tablet   No No   TAKE 1 TABLET BY MOUTH DAILY   TURMERIC PO  Self Yes No   Take 500 mg by mouth 2 (Two) Times a Day.   ubrogepant tablet   Yes No   vitamin C (ASCORBIC ACID) 500 MG tablet  Self Yes No   Take 500 mg by mouth Daily.   vitamin D3 125 MCG (5000 UT) capsule capsule  Self Yes No   Take 5,000 Units by mouth Daily.   vitamin E 400 UNIT capsule  Self No No   Take 1 capsule by mouth 2 (two) times a day.   zafirlukast (Accolate) 20 MG tablet  Self No No   Take 1 tablet by mouth 2 (Two) Times a Day.      ---------------------------------------------------------------------------------------------------------------------  Objective  Hospital Scheduled Meds:  labetalol, 10 mg, Intravenous, Once         ---------------------------------------------------------------------------------------------------------------------   Vital Signs:  Temp:  [98.5 °F (36.9 °C)] 98.5 °F (36.9 °C)  Heart Rate:  [82-87] 82  Resp:  [18-20] 18  BP: (143-189)/(78-98) 143/78  Mean Arterial Pressure (Non-Invasive) for the past 24 hrs (Last 3 readings):   Noninvasive MAP (mmHg)  06/07/22 1219 96  06/07/22 1202 100  06/07/22 1148 101    SpO2 Percentage   06/07/22 1148 06/07/22 1202 06/07/22 1219  SpO2: 99% 96% 99%    SpO2:  [96 %-100 %] 99 %  on  Flow (L/min):  [2] 2;   Device (Oxygen Therapy): nasal cannula    Body mass index is 41.61 kg/m².  Wt Readings from Last 3 Encounters:  06/07/22 132 kg (290 lb)  03/19/22 132 kg (290 lb)  02/10/22 (Abnormal) 138 kg (304 lb)    ---------------------------------------------------------------------------------------------------------------------   Physical  Exam:  Constitutional:  Well-developed and well-nourished.  No respiratory distress on baseline of 2LNC.     HENT:  Head: Normocephalic and atraumatic.  Mouth:  Moist mucous membranes.    Eyes:  Conjunctivae and EOM are normal. No scleral icterus. No erythema or drainage.   Neck:  Neck supple.  Cardiovascular:  Normal rate, regular rhythm and normal heart sounds with no murmur.  Pulmonary/Chest:  No respiratory distress, no wheezes, no crackles, with normal breath sounds and good air movement. Large mid-sternal scar present.   Abdominal:  Soft.  Bowel sounds are present x4.  No distension and no tenderness.   Musculoskeletal:  No edema, no tenderness, and no deformity.  No red or swollen joints anywhere.   Neurological:  Alert and oriented to person, place, and time. No tongue deviation.  No facial droop.  No slurred speech.   Skin:  Skin is warm and dry.  No rash noted.  No pallor.   Psychiatric:  Normal mood and affect.  Behavior is normal.  Judgment and thought content normal.   Peripheral vascular:  No edema and 1-2+ pedal pulses bilaterally.   Genitourinary: No fraser catheter in place.  ---------------------------------------------------------------------------------------------------------------------  EKG:  Pending cardiology interpretation. Per my read, reveals  sinus rhythm at 92 bpm.  QTc 504 ms.    I have personally reviewed the EKG.  -----------------      ---------------------------------------------------------------------------------------------------------------------  Assessment & Plan     Acute Hospital Problems:    -Chest pains with typical features for unstable angina   -CAD s/p remote stenting x2 2002, CABG 3V 2017, and reported stenting x1 since his CABG  -Hx of postop pericarditis requiring pericardial window 2017  -Dyslipidemia  Initial EKG without any acute changes concerning for ischemia or infarct.  Serial troponins negative x2, follow-up on serial troponins and EKG.  Received aspirin 324  mg x 1 in the ED, continue with daily aspirin 81 mg from tomorrow.  Continue home atorvastatin 80 mg daily and check fasting lipid panel in AM.  Check hemoglobin A1c level.  Continue home Toprol-XL.   Continue Nitropaste and increase to 1 inch every 6 hours.  Hold after midnight or for low blood pressure.  Previous HAMLET report from 3/2021 showed EF of 56 to 60% with moderate calcification of the aortic valve.  Repeat TTE.  Consult cardiology, appreciate their input.  We will keep n.p.o. after midnight tonight in case of need for further evaluation in AM.    -Uncontrolled hypertension  Patient reports that his blood pressure generally runs between 110 through 120 systolic at home.  BP 180s over 90s on presentation.  Could be secondary to chest pains.  BP improved with application of nitroglycerin paste.  Resume home antihypertensive regimen as appropriate with holding parameters once confirmed by pharmacy.   Monitor serial checks and adjust home medication regimen if needed.    -Prolonged QTc, 504ms  Check magnesium level and replace if needed.   Avoid QT prolonging agents.   Monitor on telemetry.     -Type II IDDM   HA1c level 9.9% in 2021, repeat pending.   Place on hypoglycemia protocol.  Patient on U500 180 units BID. Confirm with pharmacy.   Glucose 118.   Monitor accuchecks AC/HS.     -Chronic hypoxic respiratory failure  -Obstructive sleep apnea, uses BiPAP nightly.  -COPD without acute exacerbation  -Asthma without acute exacerbation  -Past history tobacco abuse, quit in 2017  Currently maintaining O2 saturations on his baseline of 2 LNC.  Continue BiPAP nightly.  CXR unremarkable.    -DVT Prophylaxis  Lovenox.    -Admission COVID-19 testing  Negative       Chronic Medical Problems:    Chronic HF PEF: Compensated.  Repeat echocardiogram pending.  Past history of tobacco abuse, quit in 2017.  History of CVA in 2021  Morbid obesity per BMI, 41.61    The patient is considered to be a high risk patient due to:  Chest pain with known CAD and multiple cardiac risk factors.    Code Status: FULL CODE.     I have discussed the patient's assessment and plan with the patient and admitting physician, Dr. Johns.     Disposition: Plans to return home on discharge.    Expected length of stay: INPATIENT status due to the need for care which can only be reasonably provided in an hospital setting, such as aggressive/expedited ancillary services and/or consultation services, the necessity for IV medications, close physician monitoring and/or the possible need for procedures.  In such, I feel the patient’s risk for adverse outcomes and need for care warrant INPATIENT evaluation and predict the patient’s care encounter to likely last beyond 2 midnights.    Carole Jhaveri PA-C  06/07/22  12:29 EDT  ---------------------------------------------------------------------------------------------------------------------       Electronically signed by Denys Johns MD at 06/07/22 1540        Emergency Department Notes    Denzel Padilla DO at 06/07/22 0958        Subjective   62-year-old male with past medical history of hypertension, hyperlipidemia, COPD, coronary artery disease requiring CABG, and congestive heart failure presents to the ER with primary complaint of retrosternal crushing chest pain with radiation into both shoulders.  Patient confirmed associated shortness of breath.  Denied obvious aggravating factors.  Denied obvious alleviating factors.  Patient has symptoms been present for the last 2 to 3 hours.  Patient noted episodes occur and lasts a few minutes.  Vitals stable          Review of Systems   Respiratory: Positive for shortness of breath.    Cardiovascular: Positive for chest pain.   All other systems reviewed and are negative.    Objective   Physical Exam  Constitutional:       General: He is not in acute distress.     Appearance: He is well-developed. He is not ill-appearing.   HENT:      Head: Normocephalic and  atraumatic.   Eyes:      Extraocular Movements: Extraocular movements intact.      Pupils: Pupils are equal, round, and reactive to light.   Neck:      Vascular: No JVD.   Cardiovascular:      Rate and Rhythm: Normal rate and regular rhythm.      Heart sounds: Normal heart sounds. No murmur heard.  Pulmonary:      Effort: No tachypnea, accessory muscle usage or respiratory distress.      Breath sounds: No stridor. Examination of the right-middle field reveals wheezing. Examination of the left-middle field reveals wheezing. Examination of the right-lower field reveals decreased breath sounds. Examination of the left-lower field reveals decreased breath sounds. Decreased breath sounds and wheezing present. No rhonchi or rales.   Chest:      Chest wall: No deformity, tenderness or crepitus.   Abdominal:      General: Bowel sounds are normal.      Palpations: Abdomen is soft.      Tenderness: There is no abdominal tenderness. There is no guarding or rebound.   Musculoskeletal:         General: Normal range of motion.      Cervical back: Normal range of motion and neck supple.      Right lower leg: No tenderness. No edema.      Left lower leg: No tenderness. No edema.   Lymphadenopathy:      Cervical: No cervical adenopathy.   Skin:     General: Skin is warm and dry.      Coloration: Skin is not cyanotic.      Findings: No ecchymosis or erythema.   Neurological:      General: No focal deficit present.      Mental Status: He is alert and oriented to person, place, and time.      Cranial Nerves: No cranial nerve deficit.      Motor: No weakness.   Psychiatric:         Mood and Affect: Mood normal. Mood is not anxious.         Behavior: Behavior normal. Behavior is not agitated.         Procedures          ED Course  ED Course as of 06/07/22 1153  Tue Jun 07, 2022  0906 EKG noted sinus rhythm.  92 bpm.  .  QRS of 102.  QTc 514.  Prolonged QT.  No acute ST elevation [SF]  1148 CBC and CCP unremarkable.  Troponin trend  x2 within normal limits.  EKG unremarkable.  COVID test negative.  Chest x-ray unremarkable.  Patient does use chronic supplemental oxygen at home.  Concerns for necessity for further ACS rule out.  Recommend admission for further work up and treatment.  Hospitalist team consulted and made aware of the patient.  Consults and orders placed per hospitalist request.  Patient was agreeable to admission plan.  Vitals stable on admission. [SF]    ED Course User Index  [SF] Denzel Padilla DO                     HEART Score: 5                            MDM    Final diagnoses:  Chest pain, unspecified type      ED Disposition  ED Disposition      ED Disposition  Decision to Admit     Condition  --     Comment  --          No follow-up provider specified.      Medication List    No changes were made to your prescriptions during this visit.         Denzel Padilla DO  06/07/22 1153      Electronically signed by Denzel Padilla DO at 06/07/22 1153      Vital Signs (last day)    Date/Time Temp Temp src Pulse Resp BP Patient Position SpO2   06/08/22 1003 -- -- 89 13 156/76 Lying 100   06/08/22 0903 -- -- 85 14 143/72 Lying 99   06/08/22 0803 -- -- 89 12 145/73 Lying 99   06/08/22 0800 97.9 (36.6) Oral -- -- -- -- --   06/08/22 0703 -- -- 89 16 157/83 Lying 99   06/08/22 0628 -- -- 90 20 -- -- 99   06/08/22 0600 -- -- 90 18 146/76 -- 100   06/08/22 0550 -- -- 91 -- 149/80 -- 100   06/08/22 0400 98.5 (36.9) Axillary 94 -- 146/78 -- 99   06/08/22 0355 -- -- 95 -- 146/75 -- 100   06/08/22 0350 -- -- -- -- -- -- 100   06/08/22 0210 -- -- 94 -- 147/79 -- 100   06/08/22 0045 -- -- 97 -- 142/70 -- 98   06/08/22 0025 -- -- -- -- -- -- 100   06/08/22 0000 98.5 (36.9) Oral -- -- -- -- --   06/07/22 2320 -- -- 98 15 141/78 -- 96   06/07/22 2215 -- -- 97 13 133/70 -- 99   06/07/22 2110 -- -- 99 -- 159/79 -- 98   06/07/22 2032 -- -- 96 -- 139/69 -- --   06/07/22 2000 98.5 (36.9) Oral -- -- -- -- --   06/07/22  1847 -- -- 92 20 -- -- 99   06/07/22 1803 -- -- 91 20 155/88 Lying 99   06/07/22 1703 -- -- 92 21 144/91 Lying 99   06/07/22 1603 -- -- 84 22 110/65 Lying 99   06/07/22 1600 98.6 (37) Oral -- -- -- -- --   06/07/22 1503 -- -- 88 21 146/78 Lying 97   06/07/22 1400 -- -- 82 24 142/81 Lying 99   06/07/22 1358 98.4 (36.9) Oral 84 16 143/76 -- 96   06/07/22 13:24:41 98.5 (36.9) Oral 85 18 134/72 Sitting 98   06/07/22 1319 -- -- 85 -- 134/72 -- 98   06/07/22 1303 -- -- 84 -- 156/81 -- 99   06/07/22 1247 -- -- 85 -- 145/78 -- 98   06/07/22 1233 -- -- 83 -- 147/78 -- 99   06/07/22 1219 -- -- 82 -- 143/78 -- 99   06/07/22 1202 -- -- 83 -- 145/79 -- 96   06/07/22 1148 -- -- 83 18 154/84 -- 99   06/07/22 1133 -- -- 83 -- 157/80 -- 98   06/07/22 1119 -- -- 83 -- -- -- 98   06/07/22 1107 -- -- -- -- 150/82 -- 98   06/07/22 1054 -- -- 83 -- 159/80 -- 99   06/07/22 1044 -- -- 84 -- 158/84 -- --   06/07/22 1039 -- -- 85 -- 158/84 -- 99   06/07/22 1025 -- -- 86 -- 157/84 -- 99   06/07/22 1011 -- -- 85 -- 166/85 -- 99   06/07/22 0955 -- -- 86 -- 168/88 -- 99   06/07/22 09:44:35 -- -- 86 18 164/85 Sitting 100   06/07/22 0940 -- -- 87 -- 164/85 -- 99   06/07/22 0933 -- -- 87 -- 178/92 -- 100   06/07/22 0914 -- -- 87 -- 182/98 -- 100   06/07/22 0912 -- -- -- -- 189/91 -- 100   06/07/22 0906 98.5 (36.9) Oral 87 20 182/98 Sitting 98        Intake & Output (last day)      06/07 0701 06/08 0700 06/08 0701 06/09 0700   Urine (mL/kg/hr) 1450 750 (1)   Total Output 1450 750   Net -1450 -750             Medication Administration Report for Denzel Harding as of 06/08/22 1240  Legend:   Given Hold Not Given Due Canceled Entry Other Actions   Time Time (Time) Time Time Time-Action       Discontinued      Completed      Future      MAR Hold      Linked        Medications 06/07/22 06/08/22   ascorbic acid (VITAMIN C) tablet 500 mg  Dose: 500 mg  Freq: Daily Route: PO  Start: 06/08/22 0900      0841-Given             aspirin EC tablet 81  mg  Dose: 81 mg  Freq: Daily Route: PO  Start: 06/07/22 1530  Admin Instructions:  Herbal/drug interaction: Avoid use with ginkgo biloba. Do not crush or chew.  Do not exceed 4 grams of aspirin in a 24 hr period.    If given for pain, use the following pain scale:   Mild Pain = Pain Score of 1-3, CPOT 1-2  Moderate Pain = Pain Score of 4-6, CPOT 3-4  Severe Pain = Pain Score of 7-10, CPOT 5-8     1527-Given             0842-Given             atorvastatin (LIPITOR) tablet 80 mg  Dose: 80 mg  Freq: Daily Route: PO  Start: 06/08/22 0900  Admin Instructions:  Avoid grapefruit juice.      0842-Given             budesonide-formoterol (SYMBICORT) 80-4.5 MCG/ACT inhaler 2 puff  Dose: 2 puff  Freq: 2 Times Daily - RT Route: IN  Start: 06/07/22 1847  Admin Instructions:  Include Respiratory Treatment Education   Shake well.  Rinse mouth after use, do not swallow water.  Send aerosols to pharmacy in ziplock bag for proper disposal.     1847-Given             0628-Given     1900            cetirizine (zyrTEC) tablet 10 mg  Dose: 10 mg  Freq: Nightly Route: PO  Start: 06/07/22 2100 2032-Given             2100             clopidogrel (PLAVIX) tablet 75 mg  Dose: 75 mg  Freq: Daily Route: PO  Start: 06/08/22 0900      0841-Given             Enoxaparin Sodium (LOVENOX) syringe 40 mg  Dose: 40 mg  Freq: Every 24 Hours Route: SC  Indications of Use: PROPHYLAXIS OF VENOUS THROMBOEMBOLISM  Start: 06/07/22 1530  Admin Instructions:  Give subcutaneous in abdomen only. Do not massage site after injection.     1527-Given             1530             gabapentin (NEURONTIN) capsule 600 mg  Dose: 600 mg  Freq: Every 8 Hours Scheduled Route: PO  Start: 06/07/22 2200  Admin Instructions:       2033-Given     (2106)-Not Given [C]            0632-Given     1400     2200           insulin regular (HumuLIN R) CONCENTRATED injection 500 unit/mL  Dose: 150 Units  Freq: Daily Before Supper Route: SC  Start: 06/07/22 1730  Admin  Instructions:  **Note High CONCENTRATION - 500 unit/mL** Prime the needle before each injection with 5 units of insulin. Hold the device in the skin for a count of 5 after the dose dial has returned to zero before removing the needle to ensure full dose was administered.   Caution: Look alike/sound alike drug alert     1828-Given             1730             insulin regular (HumuLIN R) CONCENTRATED injection 500 unit/mL  Dose: 150 Units  Freq: Daily Before Lunch Route: SC  Start: 06/08/22 1130  Admin Instructions:  **Note High CONCENTRATION - 500 unit/mL** Prime the needle before each injection with 5 units of insulin. Hold the device in the skin for a count of 5 after the dose dial has returned to zero before removing the needle to ensure full dose was administered.   Caution: Look alike/sound alike drug alert      1130             insulin regular (HumuLIN R) CONCENTRATED injection 500 unit/mL  Dose: 150 Units  Freq: Every Morning Before Breakfast Route: SC  Start: 06/08/22 0730  Admin Instructions:  **Note High CONCENTRATION - 500 unit/mL** Prime the needle before each injection with 5 units of insulin. Hold the device in the skin for a count of 5 after the dose dial has returned to zero before removing the needle to ensure full dose was administered.    Hold any AM patient is NPO   Caution: Look alike/sound alike drug alert      (0847)-Not Given             labetalol (NORMODYNE,TRANDATE) injection 10 mg  Dose: 10 mg  Freq: Once Route: IV  Start: 06/07/22 0925  Admin Instructions:  As ordered  Give by slow IV Push each 20mg (or less) over 2 minutes     0953-Hold [C]              lamoTRIgine (LaMICtal) tablet 100 mg  Dose: 100 mg  Freq: Every Morning Route: PO  Start: 06/08/22 0900  Admin Instructions:  Caution: Look alike/sound alike drug alert      0844-Given             lamoTRIgine (LaMICtal) tablet 50 mg  Dose: 50 mg  Freq: Every Evening Route: PO  Start: 06/07/22 2100  Admin Instructions:  Caution: Look  alike/sound alike drug alert     2032-Given             2100             levETIRAcetam (KEPPRA) tablet 1,000 mg  Dose: 1,000 mg  Freq: 2 Times Daily Route: PO  Start: 06/07/22 2100  Admin Instructions:  Caution: Look alike/sound alike drug alert. Swallow whole; do not crush, chew, or split tablet.     2032-Given             0841-Given     2100            metoprolol succinate XL (TOPROL-XL) 24 hr tablet 200 mg  Dose: 200 mg  Freq: Every 24 Hours Scheduled Route: PO  Start: 06/08/22 0900  Admin Instructions:  Hold pulse less than 60 or SBP less than 110  Do not crush or chew.      0841-Given             nitroglycerin (NITROSTAT) ointment 1 inch  Dose: 1 inch  Freq: Every 6 Hours Scheduled Route: TOP  Start: 06/07/22 1445  Admin Instructions:  Apply to chest wall. If scheduled every 6 hours, omit the midnight dose to allow for nitrate free interval.     1527-Given     1836-Given            0021-Medication Removed     0632-Given     1200     1800          pantoprazole (PROTONIX) EC tablet 40 mg  Dose: 40 mg  Freq: Every Morning Route: PO  Start: 06/08/22 0700  Admin Instructions:  Swallow whole; do not crush, split, or chew.      0632-Given             sodium chloride 0.9 % flush 10 mL  Dose: 10 mL  Freq: Every 12 Hours Scheduled Route: IV  Start: 06/07/22 1445     1527-Given     2032-Given            0842-Given     2100            spironolactone (ALDACTONE) tablet 25 mg  Dose: 25 mg  Freq: Daily Route: PO  Start: 06/08/22 0900      0841-Given             vitamin D3 capsule 5,000 Units  Dose: 5,000 Units  Freq: Daily Route: PO  Start: 06/08/22 0900      0841-Given             vitamin E capsule 400 Units  Dose: 400 Units  Freq: Daily Route: PO  Start: 06/07/22 1830     (1847)-Not Given             0843-Given             Vortioxetine HBr tablet 20 mg  Dose: 20 mg  Freq: Daily Route: PO  Start: 06/08/22 0900      (0847)-Not Given            Future Medications    Medications 06/07/22 06/08/22     lisinopril  (PRINIVIL,ZESTRIL) tablet 20 mg  Dose: 20 mg  Freq: Nightly Route: PO  Start: 06/08/22 2100  Admin Instructions:  Hold SBP less than 110      2100            Completed Medications    Medications 06/07/22 06/08/22     aspirin chewable tablet 324 mg  Dose: 324 mg  Freq: Once Route: PO  Indications Comment: chest pain  Start: 06/07/22 0901   End: 06/07/22 0918  Admin Instructions:  Herbal/drug interaction: Avoid use with ginkgo biloba.  Do not exceed 4 grams of aspirin in a 24 hr period.    If given for pain, use the following pain scale:   Mild Pain = Pain Score of 1-3, CPOT 1-2  Moderate Pain = Pain Score of 4-6, CPOT 3-4  Severe Pain = Pain Score of 7-10, CPOT 5-8     0918-Given              dexamethasone (DECADRON) injection 8 mg  Dose: 8 mg  Freq: Once Route: IV  Start: 06/07/22 0952   End: 06/07/22 0957  Admin Instructions:  For IV administration.  May be pushed over a minimum of 1 minute.     0957-Given              diphenhydrAMINE (BENADRYL) injection 12.5 mg  Dose: 12.5 mg  Freq: Once Route: IV  Start: 06/07/22 0952   End: 06/07/22 0958  Admin Instructions:  25 mg may be given IV push over less than 1 minute.  Caution: Look alike/sound alike drug alert. This med may be ordered in other forms and routes. Before giving verify the last time the drug was given by any route/form.       0958-Given              magnesium sulfate in D5W 1g/100mL (PREMIX)  Dose: 1 g  Freq: Once Route: IV  Start: 06/07/22 1845   End: 06/07/22 1937     1837-New Bag              nitroglycerin (NITROSTAT) ointment 0.5 inch  Dose: 0.5 inch  Freq: Once Route: TOP  Start: 06/07/22 1042   End: 06/07/22 1044  Admin Instructions:  Apply to chest wall. If scheduled every 6 hours, omit the midnight dose to allow for nitrate free interval.     1044-Given              regadenoson (LEXISCAN) injection 0.4 mg  Dose: 0.4 mg  Freq: Once in Imaging Route: IV  Start: 06/08/22 1300   End: 06/08/22 1203  Admin Instructions:  Administer over approximately 10  seconds.      1203-Given             technetium sestamibi (CARDIOLITE) injection 1 dose  Dose: 1 dose  Freq: Once in Imaging Route: IV  Start: 06/08/22 1015   End: 06/08/22 1010  Order specific questions:  Millicuries: 10.8        1010-Given            Discontinued Medications    Medications 06/07/22 06/08/22     lisinopril (PRINIVIL,ZESTRIL) tablet 5 mg  Dose: 5 mg  Freq: Nightly Route: PO  Start: 06/07/22 2100   End: 06/08/22 1108  Admin Instructions:  Hold SBP less than 110     2032-Given                  and    Medication Administration Report for Denzel Harding as of 06/08/22 1240  Legend:   Given Hold Not Given Due Canceled Entry Other Actions   Time Time (Time) Time Time Time-Action       Discontinued      Completed      Future      MAR Hold      Linked        Medications 06/07/22 06/08/22        Lab Results (all)    Procedure Component Value Units Date/Time   POC Glucose Once [[id number]]  (Abnormal) Collected: 06/08/22 0831   Specimen: Blood Updated: 06/08/22 0857    Glucose 196 mg/dL       Comment: Meter: [id number] : [id number] LILLIE BONILLA     Magnesium [[id number]]  (Normal) Collected: 06/08/22 0743   Specimen: Blood Updated: 06/08/22 0839    Magnesium 2.1 mg/dL    Troponin [[id number]]  (Normal) Collected: 06/08/22 0203   Specimen: Blood Updated: 06/08/22 0251    Troponin T <0.010 ng/mL    Narrative:     Troponin T Reference Range:  <= 0.03 ng/mL-   Negative for AMI  >0.03 ng/mL-     Abnormal for myocardial necrosis.  Clinicians would have to utilize clinical acumen, EKG, Troponin and serial changes to determine if it is an Acute Myocardial Infarction or myocardial injury due to an underlying chronic condition.       Results may be falsely decreased if patient taking Biotin.     TSH [[id number]]  (Normal) Collected: 06/08/22 0203   Specimen: Blood Updated: 06/08/22 0251    TSH 0.656 uIU/mL    Hemoglobin A1c [[id number]]  (Abnormal) Collected: 06/08/22 0203   Specimen: Blood Updated: 06/08/22  0246    Hemoglobin A1C 6.80 %    Narrative:     Hemoglobin A1C Ranges:    Increased Risk for Diabetes  5.7% to 6.4%  Diabetes                     >= 6.5%  Diabetic Goal                < 7.0%   Comprehensive Metabolic Panel [[id number]]  (Abnormal) Collected: 06/08/22 0203   Specimen: Blood Updated: 06/08/22 0245    Glucose 242 mg/dL     BUN 11 mg/dL     Creatinine 0.66 mg/dL     Sodium 137 mmol/L     Potassium 4.2 mmol/L     Chloride 103 mmol/L     CO2 24.8 mmol/L     Calcium 9.1 mg/dL     Total Protein 6.1 g/dL     Albumin 3.42 g/dL     ALT (SGPT) 37 U/L     AST (SGOT) 30 U/L     Alkaline Phosphatase 104 U/L     Total Bilirubin 0.5 mg/dL     Globulin 2.7 gm/dL     A/G Ratio 1.3 g/dL     BUN/Creatinine Ratio 16.7    Anion Gap 9.2 mmol/L     eGFR 106.7 mL/min/1.73       Comment: National Kidney Foundation and American Society of Nephrology (ASN) Task Force recommended calculation based on the Chronic Kidney Disease Epidemiology Collaboration (CKD-EPI) equation refit without adjustment for race.     Narrative:     GFR Normal >60  Chronic Kidney Disease <60  Kidney Failure <15     Lipid Panel [[id number]]  (Abnormal) Collected: 06/08/22 0203   Specimen: Blood Updated: 06/08/22 0245    Total Cholesterol 97 mg/dL     Triglycerides 100 mg/dL     HDL Cholesterol 29 mg/dL     LDL Cholesterol  49 mg/dL     VLDL Cholesterol 19 mg/dL     LDL/HDL Ratio 1.66   Narrative:     Cholesterol Reference Ranges  (U.S. Department of Health and Human Services ATP III Classifications)    Desirable          <200 mg/dL  Borderline High    200-239 mg/dL  High Risk          >240 mg/dL      Triglyceride Reference Ranges  (U.S. Department of Health and Human Services ATP III Classifications)    Normal           <150 mg/dL  Borderline High  150-199 mg/dL  High             200-499 mg/dL  Very High        >500 mg/dL    HDL Reference Ranges  (U.S. Department of Health and Human Services ATP III Classifications)    Low     <40 mg/dl (major risk factor  for CHD)  High    >60 mg/dl ('negative' risk factor for CHD)        LDL Reference Ranges  (U.S. Department of Health and Human Services ATP III Classifications)    Optimal          <100 mg/dL  Near Optimal     100-129 mg/dL  Borderline High  130-159 mg/dL  High             160-189 mg/dL  Very High        >189 mg/dL   CBC & Differential [[id number]]  (Abnormal) Collected: 06/08/22 0203   Specimen: Blood Updated: 06/08/22 0222   Narrative:     The following orders were created for panel order CBC & Differential.  Procedure                               Abnormality         Status                     ---------                               -----------         ------                     CBC Auto Differential[[id number]]        Abnormal            Final result                 Please view results for these tests on the individual orders.   CBC Auto Differential [[id number]]  (Abnormal) Collected: 06/08/22 0203   Specimen: Blood Updated: 06/08/22 0222    WBC 10.08 10*3/mm3     RBC 4.78 10*6/mm3     Hemoglobin 12.4 g/dL     Hematocrit 37.4 %     MCV 78.2 fL     MCH 25.9 pg     MCHC 33.2 g/dL     RDW 14.0 %     RDW-SD 39.6 fl     MPV 9.8 fL     Platelets 175 10*3/mm3     Neutrophil % 82.8 %     Lymphocyte % 10.7 %     Monocyte % 5.3 %     Eosinophil % 0.0 %     Basophil % 0.2 %     Immature Grans % 1.0 %     Neutrophils, Absolute 8.35 10*3/mm3     Lymphocytes, Absolute 1.08 10*3/mm3     Monocytes, Absolute 0.53 10*3/mm3     Eosinophils, Absolute 0.00 10*3/mm3     Basophils, Absolute 0.02 10*3/mm3     Immature Grans, Absolute 0.10 10*3/mm3     nRBC 0.0 /100 WBC    Troponin [[id number]]  (Normal) Collected: 06/07/22 2019   Specimen: Blood Updated: 06/07/22 2104    Troponin T <0.010 ng/mL    Narrative:     Troponin T Reference Range:  <= 0.03 ng/mL-   Negative for AMI  >0.03 ng/mL-     Abnormal for myocardial necrosis.  Clinicians would have to utilize clinical acumen, EKG, Troponin and serial changes to determine if it is an Acute  Myocardial Infarction or myocardial injury due to an underlying chronic condition.       Results may be falsely decreased if patient taking Biotin.     POC Glucose Once [[id number]]  (Abnormal) Collected: 06/07/22 1832   Specimen: Blood Updated: 06/07/22 1839    Glucose 322 mg/dL       Comment: Meter: [id number] : [id number] BRIAN ZARCO     Troponin [[id number]]  (Normal) Collected: 06/07/22 1420   Specimen: Blood Updated: 06/07/22 1554    Troponin T <0.010 ng/mL    Narrative:     Troponin T Reference Range:  <= 0.03 ng/mL-   Negative for AMI  >0.03 ng/mL-     Abnormal for myocardial necrosis.  Clinicians would have to utilize clinical acumen, EKG, Troponin and serial changes to determine if it is an Acute Myocardial Infarction or myocardial injury due to an underlying chronic condition.       Results may be falsely decreased if patient taking Biotin.     D-dimer, Quantitative [[id number]]  (Normal) Collected: 06/07/22 0919   Specimen: Blood Updated: 06/07/22 1552    D-Dimer, Quantitative 0.40 MCGFEU/mL    Narrative:     d-Dimer assay result is to be used in conjunction with a non-high clinical pretest probability (PTP) assessment tool to exclude PE and aid in diagnosis of VTE with cutoff of 0.5 MCGFEU/mL.   Magnesium [[id number]]  (Normal) Collected: 06/07/22 1118   Specimen: Blood from Hand, Right Updated: 06/07/22 1430    Magnesium 1.9 mg/dL    Troponin [[id number]]  (Normal) Collected: 06/07/22 1118   Specimen: Blood from Hand, Right Updated: 06/07/22 1144    Troponin T <0.010 ng/mL    Narrative:     Troponin T Reference Range:  <= 0.03 ng/mL-   Negative for AMI  >0.03 ng/mL-     Abnormal for myocardial necrosis.  Clinicians would have to utilize clinical acumen, EKG, Troponin and serial changes to determine if it is an Acute Myocardial Infarction or myocardial injury due to an underlying chronic condition.       Results may be falsely decreased if patient taking Biotin.     COVID-19 and FLU A/B PCR - Swab,  Nasopharynx [[id number]]  (Normal) Collected: 06/07/22 1047   Specimen: Swab from Nasopharynx Updated: 06/07/22 1143    COVID19 Not Detected    Influenza A PCR Not Detected    Influenza B PCR Not Detected   Narrative:     Fact sheet for providers: https://www.fda.gov/media/618460/download    Fact sheet for patients: https://www.fda.gov/media/852109/download    Test performed by PCR.   [facility] [[id number]] Collected: 06/07/22 0919   Specimen: Blood Updated: 06/07/22 1033   Narrative:     The following orders were created for panel order [facility].  Procedure                               Abnormality         Status                     ---------                               -----------         ------                     Green Top (Gel)[[id number]]                                  Final result               Lavender Top[[id number]]                                     Final result               Gold Top - SST[[id number]]                                   Final result               Light Blue Top[[id number]]                                   Final result                 Please view results for these tests on the individual orders.   Lavender Top [[id number]] Collected: 06/07/22 0919   Specimen: Blood Updated: 06/07/22 1033    Extra Tube hold for add-on      Comment: Auto resulted     Gold Top - SST [[id number]] Collected: 06/07/22 0919   Specimen: Blood Updated: 06/07/22 1033    Extra Tube Hold for add-ons.      Comment: Auto resulted.     Light Blue Top [[id number]] Collected: 06/07/22 0919   Specimen: Blood Updated: 06/07/22 1033    Extra Tube Hold for add-ons.      Comment: Auto resulted     Green Top (Gel) [[id number]] Collected: 06/07/22 0919   Specimen: Blood Updated: 06/07/22 1033    Extra Tube Hold for add-ons.      Comment: Auto resulted.     Troponin [[id number]]  (Normal) Collected: 06/07/22 0919   Specimen: Blood Updated: 06/07/22 0947    Troponin T <0.010 ng/mL    Narrative:     Troponin T Reference Range:  <=  0.03 ng/mL-   Negative for AMI  >0.03 ng/mL-     Abnormal for myocardial necrosis.  Clinicians would have to utilize clinical acumen, EKG, Troponin and serial changes to determine if it is an Acute Myocardial Infarction or myocardial injury due to an underlying chronic condition.       Results may be falsely decreased if patient taking Biotin.     Comprehensive Metabolic Panel [[id number]]  (Abnormal) Collected: 06/07/22 0919   Specimen: Blood Updated: 06/07/22 0945    Glucose 118 mg/dL     BUN 8 mg/dL     Creatinine 0.79 mg/dL     Sodium 139 mmol/L     Potassium 3.6 mmol/L     Chloride 100 mmol/L     CO2 27.7 mmol/L     Calcium 9.4 mg/dL     Total Protein 6.6 g/dL     Albumin 3.84 g/dL     ALT (SGPT) 40 U/L     AST (SGOT) 45 U/L     Alkaline Phosphatase 112 U/L     Total Bilirubin 0.5 mg/dL     Globulin 2.8 gm/dL     A/G Ratio 1.4 g/dL     BUN/Creatinine Ratio 10.1    Anion Gap 11.3 mmol/L     eGFR 101.1 mL/min/1.73       Comment: National Kidney Foundation and American Society of Nephrology (ASN) Task Force recommended calculation based on the Chronic Kidney Disease Epidemiology Collaboration (CKD-EPI) equation refit without adjustment for race.     Narrative:     GFR Normal >60  Chronic Kidney Disease <60  Kidney Failure <15     CBC & Differential [[id number]]  (Abnormal) Collected: 06/07/22 0919   Specimen: Blood Updated: 06/07/22 0926   Narrative:     The following orders were created for panel order CBC & Differential.  Procedure                               Abnormality         Status                     ---------                               -----------         ------                     CBC Auto Differential[[id number]]        Abnormal            Final result                 Please view results for these tests on the individual orders.   CBC Auto Differential [[id number]]  (Abnormal) Collected: 06/07/22 0919   Specimen: Blood Updated: 06/07/22  0926    WBC 8.90 10*3/mm3     RBC 5.09 10*6/mm3     Hemoglobin 13.1 g/dL     Hematocrit 40.5 %     MCV 79.6 fL     MCH 25.7 pg     MCHC 32.3 g/dL     RDW 14.4 %     RDW-SD 41.3 fl     MPV 9.7 fL     Platelets 183 10*3/mm3     Neutrophil % 70.7 %     Lymphocyte % 18.8 %     Monocyte % 6.5 %     Eosinophil % 2.7 %     Basophil % 0.6 %     Immature Grans % 0.7 %     Neutrophils, Absolute 6.30 10*3/mm3     Lymphocytes, Absolute 1.67 10*3/mm3     Monocytes, Absolute 0.58 10*3/mm3     Eosinophils, Absolute 0.24 10*3/mm3     Basophils, Absolute 0.05 10*3/mm3     Immature Grans, Absolute 0.06 10*3/mm3     nRBC 0.0 /100 WBC         Imaging Results (All)    Procedure Component Value Units Date/Time   XR Chest 1 View [[id number]] Collected: 06/07/22 0933    Updated: 06/07/22 0937   Narrative:     XR CHEST 1 VW-     CLINICAL INDICATION: Chest pain protocol        COMPARISON: None available      TECHNIQUE: Single frontal view of the chest.     FINDINGS:      LUNGS: Lungs are adequately aerated.      HEART AND MEDIASTINUM: Heart and mediastinal contours are unremarkable        SKELETON: Bony and soft tissue structures are unremarkable.            Impression:     No radiographic evidence of acute cardiac or pulmonary disease.     This report was finalized on 6/7/2022 9:34 AM by Dr. Abdifatah Shaffer MD.           Orders (all)       Start     Ordered   06/08/22 2100    lisinopril (PRINIVIL,ZESTRIL) tablet 20 mg  Nightly        06/08/22 1108   06/08/22 1300    regadenoson (LEXISCAN) injection 0.4 mg  Once in Imaging        06/08/22 1200   06/08/22 1130    insulin regular (HumuLIN R) CONCENTRATED injection 500 unit/mL  Daily Before Lunch       Note to Pharmacy: [facility] needs to verify home med Humulin R U500 pen and leave with patient/nurse. Patient does NOT want his insulin pens to be left in pharmacy.   06/07/22 1638   06/08/22 1100    POC Glucose Finger 4x Daily AC & at Bedtime  4 Times Daily Before Meals & at Bedtime      06/08/22  0843   06/08/22 1015    technetium sestamibi (CARDIOLITE) injection 1 dose  Once in Imaging        06/08/22 1127   06/08/22 0900    atorvastatin (LIPITOR) tablet 80 mg  Daily        06/07/22 1353   06/08/22 0900    lamoTRIgine (LaMICtal) tablet 100 mg  Every Morning        06/07/22 1638   06/08/22 0900    Vortioxetine HBr tablet 20 mg  Daily       Note to Pharmacy: Please let med rec tech know if continued so they can confirm if pt can bring in home supply   06/07/22 1638   06/08/22 0900    metoprolol succinate XL (TOPROL-XL) 24 hr tablet 200 mg  Every 24 Hours Scheduled        06/07/22 1638   06/08/22 0900    spironolactone (ALDACTONE) tablet 25 mg  Daily        06/07/22 1638   06/08/22 0900    clopidogrel (PLAVIX) tablet 75 mg  Daily        06/07/22 1638   06/08/22 0900    ascorbic acid (VITAMIN C) tablet 500 mg  Daily        06/07/22 1638   06/08/22 0900    vitamin D3 capsule 5,000 Units  Daily        06/07/22 1638   06/08/22 0858    POC Glucose Once  PROCEDURE ONCE        06/08/22 0831   06/08/22 0843    Resume constant carb diet post stress test  Misc Nursing Order (Specify)  Once       Comments: Resume constant carb diet post stress test   06/08/22 0843   06/08/22 0730    insulin regular (HumuLIN R) CONCENTRATED injection 500 unit/mL  Every Morning Before Breakfast       Note to Pharmacy: [facility] needs to verify home med Humulin R U500 pen and leave with patient/nurse. Patient does NOT want his insulin pens to be left in pharmacy.   06/07/22 1638   06/08/22 0700    pantoprazole (PROTONIX) EC tablet 40 mg  Every Morning        06/07/22 1638   06/08/22 0659    Magnesium  Timed        06/08/22 0658   06/08/22 0600    Comprehensive Metabolic Panel  Morning Draw        06/07/22 1353   06/08/22 0600    CBC & Differential  Morning Draw        06/07/22 1353   06/08/22 0600    Lipid Panel  Morning Draw        06/07/22 1353   06/08/22 0600    Hemoglobin A1c  Morning Draw        06/07/22 1353   06/08/22 0600    TSH  Morning  Draw        06/07/22 1353   06/08/22 0600    CBC Auto Differential  PROCEDURE ONCE        06/07/22 2205   06/08/22 0001    NPO Diet NPO Type: Sips with Meds  Diet Effective Midnight        06/07/22 1506   06/08/22 0000    Stress Test With Myocardial Perfusion One Day  Once        06/07/22 1506   06/08/22 0000    ipratropium (ATROVENT) nebulizer solution 0.5 mg  Every 6 Hours PRN        06/07/22 1638   06/07/22 2200    gabapentin (NEURONTIN) capsule 600 mg  Every 8 Hours Scheduled        06/07/22 1638   06/07/22 2100    lamoTRIgine (LaMICtal) tablet 50 mg  Every Evening        06/07/22 1638   06/07/22 2100    levETIRAcetam (KEPPRA) tablet 1,000 mg  2 Times Daily        06/07/22 1638   06/07/22 2100    cetirizine (zyrTEC) tablet 10 mg  Nightly        06/07/22 1638   06/07/22 2100    lisinopril (PRINIVIL,ZESTRIL) tablet 5 mg  Nightly,   Status:  Discontinued        06/07/22 1638   06/07/22 1847    budesonide-formoterol (SYMBICORT) 80-4.5 MCG/ACT inhaler 2 puff  2 Times Daily - RT        06/07/22 1638   06/07/22 1845    magnesium sulfate in D5W 1g/100mL (PREMIX)  Once        06/07/22 1725   06/07/22 1840    POC Glucose Once  PROCEDURE ONCE        06/07/22 1832   06/07/22 1830    vitamin E capsule 400 Units  Daily        06/07/22 1638   06/07/22 1800    Oral Care  2 Times Daily      06/07/22 1353   06/07/22 1730    insulin regular (HumuLIN R) CONCENTRATED injection 500 unit/mL  Daily Before Supper       Note to Pharmacy: [facility] needs to verify home med Humulin R U500 pen and leave with patient/nurse. Patient does NOT want his insulin pens to be left in pharmacy.   06/07/22 1638   06/07/22 1633    HYDROmorphone (DILAUDID) tablet 4 mg  Daily PRN        06/07/22 1638   06/07/22 1632    tiZANidine (ZANAFLEX) tablet 8 mg  Nightly PRN        06/07/22 1638   06/07/22 1632    albuterol (PROVENTIL) nebulizer solution 0.083% 2.5 mg/3mL  Every 4 Hours PRN        06/07/22 1638   06/07/22 1600    Vital Signs  Every 4 Hours      06/07/22  1353   06/07/22 1548    ECG 12 Lead  STAT        06/07/22 1548   06/07/22 1534    D-dimer, Quantitative  STAT        06/07/22 1533   06/07/22 1530    aspirin EC tablet 81 mg  Daily        06/07/22 1353   06/07/22 1530    Enoxaparin Sodium (LOVENOX) syringe 40 mg  Every 24 Hours        06/07/22 1353   06/07/22 1445    nitroglycerin (NITROSTAT) ointment 1 inch  Every 6 Hours Scheduled        06/07/22 1353   06/07/22 1445    sodium chloride 0.9 % flush 10 mL  Every 12 Hours Scheduled        06/07/22 1353   06/07/22 1412    NIPPV (CPAP or BIPAP)  At Bedtime & As Needed-RT       Comments: Respiratory for settings.   06/07/22 1411   06/07/22 1412    ECG 12 Lead  Once,   Status:  Canceled        06/07/22 1411   06/07/22 1354    Troponin  Now Then Every 6 Hours      06/07/22 1353   06/07/22 1354    Adult Transthoracic Echo Complete W/ Cont if Necessary Per Protocol  Once        06/07/22 1353   06/07/22 1354    Inpatient Cardiology Consult  Once       Specialty:  Cardiology  Provider:  Gurinder Walden MD   06/07/22 1353   06/07/22 1354    Intake & Output  Every Shift      06/07/22 1353   06/07/22 1354    Weigh Patient  Once        06/07/22 1353   06/07/22 1354    Oxygen Therapy- Nasal Cannula; Titrate for SPO2: 90% - 95%  Continuous        06/07/22 1353   06/07/22 1354    Insert Peripheral IV  Once        06/07/22 1353   06/07/22 1354    Saline Lock & Maintain IV Access  Continuous        06/07/22 1353   06/07/22 1354    Pulse Oximetry, Continuous  Continuous        06/07/22 1353   06/07/22 1354    Cardiac Monitoring  Continuous        06/07/22 1353   06/07/22 1354    Fall Precautions  Continuous        06/07/22 1353   06/07/22 1354    Diet Regular; Consistent Carbohydrate  Diet Effective Now,   Status:  Canceled        06/07/22 1353   06/07/22 1353    sodium chloride 0.9 % flush 10 mL  As Needed        06/07/22 1353   06/07/22 1353    acetaminophen (TYLENOL) tablet 650 mg  Every 4 Hours PRN        06/07/22 1353   06/07/22  1340    Magnesium  Once        06/07/22 1339   06/07/22 1159    Inpatient Admission  Once        06/07/22 1200   06/07/22 1159    Code Status and Medical Interventions:  Continuous        06/07/22 1200   06/07/22 1042    nitroglycerin (NITROSTAT) ointment 0.5 inch  Once        06/07/22 1040   06/07/22 1042    COVID-19 and FLU A/B PCR - Swab, Nasopharynx  Once        06/07/22 1041   06/07/22 0952    dexamethasone (DECADRON) injection 8 mg  Once        06/07/22 0950   06/07/22 0952    diphenhydrAMINE (BENADRYL) injection 12.5 mg  Once        06/07/22 0950   06/07/22 0925    labetalol (NORMODYNE,TRANDATE) injection 10 mg  Once        06/07/22 0923   06/07/22 0901    aspirin chewable tablet 324 mg  Once        06/07/22 0859   06/07/22 0900    Cardiac monitoring  Continuous,   Status:  Canceled        06/07/22 0859   06/07/22 0900    Continuous Pulse Oximetry  Continuous,   Status:  Canceled        06/07/22 0859   06/07/22 0900    Vital Signs  Per Hospital Policy        06/07/22 0859   06/07/22 0900    ECG 12 Lead  Once        06/07/22 0859   06/07/22 0900    XR Chest 1 View  1 Time Imaging        06/07/22 0859   06/07/22 0900    Insert peripheral IV  Once        06/07/22 0859   06/07/22 0900    [place] Draw  Once        06/07/22 0859   06/07/22 0900    CBC & Differential  Once        06/07/22 0859   06/07/22 0900    Comprehensive Metabolic Panel  Once        06/07/22 0859   06/07/22 0900    Troponin  Now Then Every 2 Hours      06/07/22 0859   06/07/22 0900    Green Top (Gel)  PROCEDURE ONCE        06/07/22 0859   06/07/22 0900    Lavender Top  PROCEDURE ONCE        06/07/22 0859   06/07/22 0900    Gold Top - SST  PROCEDURE ONCE        06/07/22 0859   06/07/22 0900    Light Blue Top  PROCEDURE ONCE        06/07/22 0859   06/07/22 0900    CBC Auto Differential  PROCEDURE ONCE        06/07/22 0859   06/07/22 0859    Oxygen Therapy- Nasal Cannula; 2 LPM; Titrate for SPO2: equal to or greater than, 92%  Continuous PRN,    Status:  Canceled      06/07/22 0859   06/07/22 0859    sodium chloride 0.9 % flush 10 mL  As Needed        06/07/22 0859   05/06/22 0000    HYDROmorphone (DILAUDID) 2 MG tablet  Daily PRN        06/07/22 1418   05/06/22 0000    HYDROmorphone (DILAUDID) 4 MG tablet  Daily PRN        06/07/22 1418   Unscheduled    ECG 12 Lead  As Needed      06/07/22 0859   Unscheduled    Up With Assistance  As Needed      06/07/22 1353   Unscheduled    Patient May Use Home CPAP / BIPAP For Sleep or As Needed  As Needed      06/07/22 1411   --    aspirin 81 MG EC tablet  Daily        06/07/22 1416   --    Dupilumab (Dupixent) 300 MG/2ML solution pen-injector  Every 14 Days        06/07/22 1417   --    lamoTRIgine (LaMICtal) 100 MG tablet  Every Evening        06/07/22 1418   --    Vortioxetine HBr (Trintellix) 20 MG tablet  Daily        06/07/22 1422   --    tiZANidine (ZANAFLEX) 4 MG tablet  Nightly PRN        06/07/22 1426   --    SUMAtriptan (IMITREX) 50 MG tablet  Every 2 Hours PRN        06/07/22 1426   --    Insulin Regular Human, Conc, (HumuLIN R) 500 UNIT/ML solution pen-injector CONCENTRATED injection  Daily With Lunch        06/07/22 1426   --    Insulin Regular Human, Conc, (HumuLIN R) 500 UNIT/ML solution pen-injector CONCENTRATED injection  Daily With Dinner        06/07/22 1426   --    isosorbide mononitrate (IMDUR) 60 MG 24 hr tablet  Daily        06/07/22 1433   --    lisinopril (PRINIVIL,ZESTRIL) 5 MG tablet  Every Night at Bedtime        06/07/22 1433              Physician Progress Notes (all)    Denys Johns MD at 06/08/22 1052        Assisted By: Cristo TAYLOR    CC: Follow-up on chest pain    Interview History/HPI: Patient states he is having if any very low intensity chest discomfort this morning.  He states he has been wearing oxygen at home about a year.  He sees pulmonologist in [place].  Last July he had pulmonary function test into my viewing he had more of a restrictive type pattern.  He did have a CT of  his chest in March at an [facility] hospital which are reviewed the images, he had some patchy infiltrates but he states in 2002 he was working at a [employer] and inhaled a bunch of fumes and apparently had a Klebsiella pneumonia taking him into the hospital for over 40 days.  He states he has not worked since.    ROS:     Vitals:   06/08/22 1003  BP: 156/76  Pulse: 89  Resp: 13  Temp:   SpO2: 100%        Intake/Output Summary (Last 24 hours) at 6/8/2022 1052  Last data filed at 6/8/2022 0900  Gross per 24 hour  Intake --  Output 2200 ml  Net -2200 ml      EXAM: Temperature is 97.9, he is in no distress mood is good on 2 L with good saturations he can speak in full sentence without difficulty, not using  muscles to breathe, lungs have bilateral breath sounds are clear no rhonchi rales or wheezing heard, heart is a regular rate and rhythm, distant, no murmur, abdomen soft benign, no extremity edema noted.      EKG: Nonspecific image reviewed    LABS:     Lab Results (last 48 hours)    Procedure Component Value Units Date/Time   POC Glucose Once [[id number]]  (Abnormal) Collected: 06/08/22 0831   Specimen: Blood Updated: 06/08/22 0857    Glucose 196 mg/dL       Comment: Meter: [id number] : [id number] LILLIE BONILLA     Magnesium [[id number]]  (Normal) Collected: 06/08/22 0743   Specimen: Blood Updated: 06/08/22 0839    Magnesium 2.1 mg/dL    Troponin [[id number]]  (Normal) Collected: 06/08/22 0203   Specimen: Blood Updated: 06/08/22 0251    Troponin T <0.010 ng/mL    Narrative:     Troponin T Reference Range:  <= 0.03 ng/mL-   Negative for AMI  >0.03 ng/mL-     Abnormal for myocardial necrosis.  Clinicians would have to utilize clinical acumen, EKG, Troponin and serial changes to determine if it is an Acute Myocardial Infarction or myocardial injury due to an underlying chronic condition.       Results may be falsely decreased if patient taking Biotin.     TSH [[id number]]  (Normal) Collected: 06/08/22  0203   Specimen: Blood Updated: 06/08/22 0251    TSH 0.656 uIU/mL    Hemoglobin A1c [[id number]]  (Abnormal) Collected: 06/08/22 0203   Specimen: Blood Updated: 06/08/22 0246    Hemoglobin A1C 6.80 %    Narrative:     Hemoglobin A1C Ranges:    Increased Risk for Diabetes  5.7% to 6.4%  Diabetes                     >= 6.5%  Diabetic Goal                < 7.0%   Comprehensive Metabolic Panel [[id number]]  (Abnormal) Collected: 06/08/22 0203   Specimen: Blood Updated: 06/08/22 0245    Glucose 242 mg/dL     BUN 11 mg/dL     Creatinine 0.66 mg/dL     Sodium 137 mmol/L     Potassium 4.2 mmol/L     Chloride 103 mmol/L     CO2 24.8 mmol/L     Calcium 9.1 mg/dL     Total Protein 6.1 g/dL     Albumin 3.42 g/dL     ALT (SGPT) 37 U/L     AST (SGOT) 30 U/L     Alkaline Phosphatase 104 U/L     Total Bilirubin 0.5 mg/dL     Globulin 2.7 gm/dL     A/G Ratio 1.3 g/dL     BUN/Creatinine Ratio 16.7    Anion Gap 9.2 mmol/L     eGFR 106.7 mL/min/1.73       Comment: National Kidney Foundation and American Society of Nephrology (ASN) Task Force recommended calculation based on the Chronic Kidney Disease Epidemiology Collaboration (CKD-EPI) equation refit without adjustment for race.     Narrative:     GFR Normal >60  Chronic Kidney Disease <60  Kidney Failure <15     Lipid Panel [[id number]]  (Abnormal) Collected: 06/08/22 0203   Specimen: Blood Updated: 06/08/22 0245    Total Cholesterol 97 mg/dL     Triglycerides 100 mg/dL     HDL Cholesterol 29 mg/dL     LDL Cholesterol  49 mg/dL     VLDL Cholesterol 19 mg/dL     LDL/HDL Ratio 1.66   Narrative:     Cholesterol Reference Ranges  (U.S. Department of Health and Human Services ATP III Classifications)    Desirable          <200 mg/dL  Borderline High    200-239 mg/dL  High Risk          >240 mg/dL      Triglyceride Reference Ranges  (U.S. Department of Health and Human Services ATP III Classifications)    Normal           <150 mg/dL  Borderline High  150-199 mg/dL  High             200-499  mg/dL  Very High        >500 mg/dL    HDL Reference Ranges  (U.S. Department of Health and Human Services ATP III Classifications)    Low     <40 mg/dl (major risk factor for CHD)  High    >60 mg/dl ('negative' risk factor for CHD)        LDL Reference Ranges  (U.S. Department of Health and Human Services ATP III Classifications)    Optimal          <100 mg/dL  Near Optimal     100-129 mg/dL  Borderline High  130-159 mg/dL  High             160-189 mg/dL  Very High        >189 mg/dL   CBC & Differential [[id number]]  (Abnormal) Collected: 06/08/22 0203   Specimen: Blood Updated: 06/08/22 0222   Narrative:     The following orders were created for panel order CBC & Differential.  Procedure                               Abnormality         Status                     ---------                               -----------         ------                     CBC Auto Differential[[id number]]        Abnormal            Final result                 Please view results for these tests on the individual orders.   CBC Auto Differential [[id number]]  (Abnormal) Collected: 06/08/22 0203   Specimen: Blood Updated: 06/08/22 0222    WBC 10.08 10*3/mm3     RBC 4.78 10*6/mm3     Hemoglobin 12.4 g/dL     Hematocrit 37.4 %     MCV 78.2 fL     MCH 25.9 pg     MCHC 33.2 g/dL     RDW 14.0 %     RDW-SD 39.6 fl     MPV 9.8 fL     Platelets 175 10*3/mm3     Neutrophil % 82.8 %     Lymphocyte % 10.7 %     Monocyte % 5.3 %     Eosinophil % 0.0 %     Basophil % 0.2 %     Immature Grans % 1.0 %     Neutrophils, Absolute 8.35 10*3/mm3     Lymphocytes, Absolute 1.08 10*3/mm3     Monocytes, Absolute 0.53 10*3/mm3     Eosinophils, Absolute 0.00 10*3/mm3     Basophils, Absolute 0.02 10*3/mm3     Immature Grans, Absolute 0.10 10*3/mm3     nRBC 0.0 /100 WBC    Troponin [[id number]]  (Normal) Collected: 06/07/22 2019   Specimen: Blood Updated: 06/07/22 2104    Troponin T <0.010 ng/mL    Narrative:     Troponin T Reference Range:  <= 0.03 ng/mL-   Negative for  AMI  >0.03 ng/mL-     Abnormal for myocardial necrosis.  Clinicians would have to utilize clinical acumen, EKG, Troponin and serial changes to determine if it is an Acute Myocardial Infarction or myocardial injury due to an underlying chronic condition.       Results may be falsely decreased if patient taking Biotin.     POC Glucose Once [[id number]]  (Abnormal) Collected: 06/07/22 1832   Specimen: Blood Updated: 06/07/22 1839    Glucose 322 mg/dL       Comment: Meter: [id number] : [id number] BRIAN ZARCO     Troponin [[id number]]  (Normal) Collected: 06/07/22 1420   Specimen: Blood Updated: 06/07/22 1554    Troponin T <0.010 ng/mL    Narrative:     Troponin T Reference Range:  <= 0.03 ng/mL-   Negative for AMI  >0.03 ng/mL-     Abnormal for myocardial necrosis.  Clinicians would have to utilize clinical acumen, EKG, Troponin and serial changes to determine if it is an Acute Myocardial Infarction or myocardial injury due to an underlying chronic condition.       Results may be falsely decreased if patient taking Biotin.     D-dimer, Quantitative [[id number]]  (Normal) Collected: 06/07/22 0919   Specimen: Blood Updated: 06/07/22 1552    D-Dimer, Quantitative 0.40 MCGFEU/mL    Narrative:     d-Dimer assay result is to be used in conjunction with a non-high clinical pretest probability (PTP) assessment tool to exclude PE and aid in diagnosis of VTE with cutoff of 0.5 MCGFEU/mL.   Magnesium [[id number]]  (Normal) Collected: 06/07/22 1118   Specimen: Blood from Hand, Right Updated: 06/07/22 1430    Magnesium 1.9 mg/dL    Troponin [[id number]]  (Normal) Collected: 06/07/22 1118   Specimen: Blood from Hand, Right Updated: 06/07/22 1144    Troponin T <0.010 ng/mL    Narrative:     Troponin T Reference Range:  <= 0.03 ng/mL-   Negative for AMI  >0.03 ng/mL-     Abnormal for myocardial necrosis.  Clinicians would have to utilize clinical acumen, EKG, Troponin and serial changes to determine if it is an Acute Myocardial  Infarction or myocardial injury due to an underlying chronic condition.       Results may be falsely decreased if patient taking Biotin.     COVID-19 and FLU A/B PCR - Swab, Nasopharynx [[id number]]  (Normal) Collected: 06/07/22 1047   Specimen: Swab from Nasopharynx Updated: 06/07/22 1143    COVID19 Not Detected    Influenza A PCR Not Detected    Influenza B PCR Not Detected   Narrative:     Fact sheet for providers: https://www.fda.gov/media/146152/download    Fact sheet for patients: https://www.fda.gov/media/291750/download    Test performed by PCR.   [place] Draw [[id number]] Collected: 06/07/22 0919   Specimen: Blood Updated: 06/07/22 1033   Narrative:     The following orders were created for panel order [place] Draw.  Procedure                               Abnormality         Status                     ---------                               -----------         ------                     Green Top (Gel)[[id number]]                                  Final result               Lavender Top[[id number]]                                     Final result               Gold Top - SST[[id number]]                                   Final result               Light Blue Top[[id number]]                                   Final result                 Please view results for these tests on the individual orders.   Lavender Top [[id number]] Collected: 06/07/22 0919   Specimen: Blood Updated: 06/07/22 1033    Extra Tube hold for add-on      Comment: Auto resulted     Gold Top - SST [[id number]] Collected: 06/07/22 0919   Specimen: Blood Updated: 06/07/22 1033    Extra Tube Hold for add-ons.      Comment: Auto resulted.     Light Blue Top [[id number]] Collected: 06/07/22 0919   Specimen: Blood Updated: 06/07/22 1033    Extra Tube Hold for add-ons.      Comment: Auto resulted     Green Top (Gel) [[id number]] Collected: 06/07/22 0919   Specimen: Blood Updated: 06/07/22 1033    Extra Tube Hold for add-ons.      Comment: Auto  resulted.     Troponin [[id number]]  (Normal) Collected: 06/07/22 0919   Specimen: Blood Updated: 06/07/22 0947    Troponin T <0.010 ng/mL    Narrative:     Troponin T Reference Range:  <= 0.03 ng/mL-   Negative for AMI  >0.03 ng/mL-     Abnormal for myocardial necrosis.  Clinicians would have to utilize clinical acumen, EKG, Troponin and serial changes to determine if it is an Acute Myocardial Infarction or myocardial injury due to an underlying chronic condition.       Results may be falsely decreased if patient taking Biotin.     Comprehensive Metabolic Panel [[id number]]  (Abnormal) Collected: 06/07/22 0919   Specimen: Blood Updated: 06/07/22 0945    Glucose 118 mg/dL     BUN 8 mg/dL     Creatinine 0.79 mg/dL     Sodium 139 mmol/L     Potassium 3.6 mmol/L     Chloride 100 mmol/L     CO2 27.7 mmol/L     Calcium 9.4 mg/dL     Total Protein 6.6 g/dL     Albumin 3.84 g/dL     ALT (SGPT) 40 U/L     AST (SGOT) 45 U/L     Alkaline Phosphatase 112 U/L     Total Bilirubin 0.5 mg/dL     Globulin 2.8 gm/dL     A/G Ratio 1.4 g/dL     BUN/Creatinine Ratio 10.1    Anion Gap 11.3 mmol/L     eGFR 101.1 mL/min/1.73       Comment: National Kidney Foundation and American Society of Nephrology (ASN) Task Force recommended calculation based on the Chronic Kidney Disease Epidemiology Collaboration (CKD-EPI) equation refit without adjustment for race.     Narrative:     GFR Normal >60  Chronic Kidney Disease <60  Kidney Failure <15     CBC & Differential [[id number]]  (Abnormal) Collected: 06/07/22 0919   Specimen: Blood Updated: 06/07/22 0926   Narrative:     The following orders were created for panel order CBC & Differential.  Procedure                               Abnormality         Status                     ---------                               -----------         ------                     CBC Auto Differential[[id number]]        Abnormal            Final result                 Please view results for these tests on the  individual orders.   CBC Auto Differential [[id number]]  (Abnormal) Collected: 06/07/22 0919   Specimen: Blood Updated: 06/07/22 0926    WBC 8.90 10*3/mm3     RBC 5.09 10*6/mm3     Hemoglobin 13.1 g/dL     Hematocrit 40.5 %     MCV 79.6 fL     MCH 25.7 pg     MCHC 32.3 g/dL     RDW 14.4 %     RDW-SD 41.3 fl     MPV 9.7 fL     Platelets 183 10*3/mm3     Neutrophil % 70.7 %     Lymphocyte % 18.8 %     Monocyte % 6.5 %     Eosinophil % 2.7 %     Basophil % 0.6 %     Immature Grans % 0.7 %     Neutrophils, Absolute 6.30 10*3/mm3     Lymphocytes, Absolute 1.67 10*3/mm3     Monocytes, Absolute 0.58 10*3/mm3     Eosinophils, Absolute 0.24 10*3/mm3     Basophils, Absolute 0.05 10*3/mm3     Immature Grans, Absolute 0.06 10*3/mm3     nRBC 0.0 /100 WBC              Radiology:    Imaging Results (Last 72 Hours)    Procedure Component Value Units Date/Time   XR Chest 1 View [[id number]] Collected: 06/07/22 0933    Updated: 06/07/22 0937   Narrative:     XR CHEST 1 VW-     CLINICAL INDICATION: Chest pain protocol        COMPARISON: None available      TECHNIQUE: Single frontal view of the chest.     FINDINGS:      LUNGS: Lungs are adequately aerated.      HEART AND MEDIASTINUM: Heart and mediastinal contours are unremarkable        SKELETON: Bony and soft tissue structures are unremarkable.            Impression:     No radiographic evidence of acute cardiac or pulmonary disease.     This report was finalized on 6/7/2022 9:34 AM by Dr. Abdifatah Shaffer MD.           Results for orders placed during the hospital encounter of 02/27/21    Adult Transesophageal Echo (HAMLET) W/ Cont if Necessary Per Protocol    Interpretation Summary  · Estimated left ventricular EF = 60% Left ventricular ejection fraction appears to be 56 - 60%. Left ventricular systolic function is normal.  · Saline test results are negative.  · The right atrial cavity is small in size.  · There is moderate calcification of the aortic valve mainly affecting the  non-coronary cusp(s).      Assessment/Plan:   Chest pain, typical in nature for unstable angina but with negative troponins and nonspecific EKG in a patient with previous coronary artery bypass grafting.  Patient has been seen by cardiology, patient to undergo stress test today.  Further decision about disposition based on stress test.  I did check a D-dimer that is normal.  It is noted patient postop pericarditis after bypass and had an LAD endarterectomy.  He also had a left anterior thoracotomy and pericardial effusion post bypass as well.  Patient is on DAPT as well as statin and metoprolol as well as nitroglycerin paste.  Chest pain is certainly improved over admission.  Echo also pending.    Chronic hypoxic respiratory failure, stable    DVT prophylaxis, subcu Lovenox    Morbid obesity by BMI overall affecting his ability to mobilize    Diabetes type 2 insulin requiring, A1c is good, he takes massive amounts of insulin but he does have a freestyle emily that checks his glucose and this seems to be accurate hours.    Hypertension, borderline control, follow on home medications and adjust as needed    Disposition Home    Denys Johns MD       Electronically signed by Denys Johns MD at 22 [phone]    Gurinder Walden MD at 22 [phone]             LOS: 1 day     Name: Denzel Harding  Age/Sex: 62 y.o. male  :  1960        PCP: Isaura Godoy MD  REF: No Known Provider    Principal Problem:    Chest pain, unspecified type      Reason for follow-up: Chest pain and history of coronary artery disease    Subjective    Subjective     Denzel Harding 62-year-old male with a past medical history significant for coronary artery disease status post CABG x3 with LAD endarterectomy, postop pericarditis with effusion status post left anterior thoracotomy and pericardial window in 2017, essential hypertension, diabetes mellitus type 2, history of CVA, COPD, obstructive sleep apnea and hyperlipidemia.  Patient  states that he is on his way to a doctor's appointment this a.m. when he developed sudden onset chest pain in the middle of his chest.      Interval History: Patient scheduled for nuclear stress test today. Denies any chest pain. Blood pressure improved but still hypertensive.     Vital Signs  Temp:  [97.9 °F (36.6 °C)-98.6 °F (37 °C)] 97.9 °F (36.6 °C)  Heart Rate:  [82-99] 90  Resp:  [13-24] 20  BP: (110-178)/(65-92) 146/76     Vital Signs (last 72 hrs)      06/05 0700  06/06 0659 06/06 0700  06/07 0659 06/07 0700  06/08 0659 06/08 0700  06/08 0923   Most Recent     Temp (°F)       98.4 -  98.6       97.9    97.9 (36.6) 06/08 0800   Heart Rate       82 -  99      90 06/08 0628   Resp       13 -  24      20 06/08 0628   BP       110/65 -  189/91      146/76 06/08 0600   SpO2 (%)       96 -  100      99 06/08 0628      Documented weights   06/07/22 0906 06/07/22 1358 06/08/22 0400  Weight: 132 kg (290 lb) (!) 140 kg (308 lb) (!) 139 kg (306 lb 11.2 oz)     Body mass index is 44.01 kg/m².    Intake/Output Summary (Last 24 hours) at 6/8/2022 0923  Last data filed at 6/8/2022 0900  Gross per 24 hour  Intake --  Output 2200 ml  Net -2200 ml    Objective    Objective      Physical Exam:     General Appearance:    Alert, cooperative, in no acute distress  Head:    Normocephalic, without obvious abnormality, atraumatic  Eyes:            Conjunctivae and sclerae normal, no   icterus, no pallor, corneas clear.  Neck:   No adenopathy, supple, trachea midline, no thyromegaly, no   carotid bruit, no JVD  Lungs:     Clear to auscultation,respirations regular, even and                  unlabored   Heart:    Regular rhythm and normal rate, normal S1 and S2, no            murmur, no gallop, no rub, no click  Chest Wall:    No abnormalities observed  Abdomen:     Normal bowel sounds, no masses, no organomegaly, soft        nontender, nondistended, no guarding, no rebound                tenderness  Extremities:   Moves all  extremities well, no edema, no cyanosis, no             redness  Pulses:   Pulses palpable and equal bilaterally  Skin:   No bleeding, bruising or rash     Neurologic:   Alert and oriented     Results review      Results Review:   Results from last 7 days  Lab Units 06/08/22  0203 06/07/22  0919  WBC 10*3/mm3 10.08 8.90  HEMOGLOBIN g/dL 12.4* 13.1  PLATELETS 10*3/mm3 175 183    Results from last 7 days  Lab Units 06/08/22  0203 06/07/22  0919  SODIUM mmol/L 137 139  POTASSIUM mmol/L 4.2 3.6  CHLORIDE mmol/L 103 100  CO2 mmol/L 24.8 27.7  BUN mg/dL 11 8  CREATININE mg/dL 0.66* 0.79  CALCIUM mg/dL 9.1 9.4  GLUCOSE mg/dL 242* 118*  ALT (SGPT) U/L 37 40  AST (SGOT) U/L 30 45*    Results from last 7 days  Lab Units 06/08/22  0203 06/07/22  2019 06/07/22  1420 06/07/22  1118 06/07/22  0919  TROPONIN T ng/mL <0.010 <0.010 <0.010 <0.010 <0.010    Lab Results  Component Value Date   INR 1.05 02/27/2021   INR 0.98 06/17/2020   INR 1.01 10/15/2019   INR 0.97 01/28/2018   INR 1.1 01/28/2018   INR 1.1 12/17/2017   INR 0.97 08/16/2017    Lab Results  Component Value Date   MG 2.1 06/08/2022   MG 1.9 06/07/2022   MG 1.8 03/19/2022    Lab Results  Component Value Date   TSH 0.656 06/08/2022   CHLPL 123 07/01/2021   TRIG 100 06/08/2022   HDL 29 (L) 06/08/2022   LDL 49 06/08/2022     Imaging Results (Last 48 Hours)    Procedure Component Value Units Date/Time   XR Chest 1 View [[id number]] Collected: 06/07/22 0933    Updated: 06/07/22 0937   Narrative:     XR CHEST 1 VW-     CLINICAL INDICATION: Chest pain protocol        COMPARISON: None available      TECHNIQUE: Single frontal view of the chest.     FINDINGS:      LUNGS: Lungs are adequately aerated.      HEART AND MEDIASTINUM: Heart and mediastinal contours are unremarkable        SKELETON: Bony and soft tissue structures are unremarkable.            Impression:     No radiographic evidence of acute cardiac or pulmonary disease.     This report was finalized on 6/7/2022 9:34 AM by  Dr. Abdifatah Shaffer MD.         Lab Results  Component Value Date   BNP 10.0 06/09/2018    Echo   Results for orders placed during the hospital encounter of 02/27/21    Adult Transesophageal Echo (HAMLET) W/ Cont if Necessary Per Protocol    Interpretation Summary  · Estimated left ventricular EF = 60% Left ventricular ejection fraction appears to be 56 - 60%. Left ventricular systolic function is normal.  · Saline test results are negative.  · The right atrial cavity is small in size.  · There is moderate calcification of the aortic valve mainly affecting the non-coronary cusp(s).     I reviewed the patient's new clinical results.    Telemetry: NSR 80-90 bpm      Medication Review:   vitamin C, 500 mg, Oral, Daily  aspirin, 81 mg, Oral, Daily  atorvastatin, 80 mg, Oral, Daily  budesonide-formoterol, 2 puff, Inhalation, BID - RT  cetirizine, 10 mg, Oral, Nightly  clopidogrel, 75 mg, Oral, Daily  enoxaparin, 40 mg, Subcutaneous, Q24H  gabapentin, 600 mg, Oral, Q8H  Insulin Regular Human (Conc), 150 Units, Subcutaneous, QAM AC  Insulin Regular Human (Conc), 150 Units, Subcutaneous, Daily Before Lunch  Insulin Regular Human (Conc), 150 Units, Subcutaneous, Daily Before Supper  labetalol, 10 mg, Intravenous, Once  lamoTRIgine, 100 mg, Oral, QAM  lamoTRIgine, 50 mg, Oral, Q PM  levETIRAcetam, 1,000 mg, Oral, BID  lisinopril, 5 mg, Oral, Nightly  metoprolol succinate XL, 200 mg, Oral, Q24H  nitroglycerin, 1 inch, Topical, Q6H  pantoprazole, 40 mg, Oral, QAM  sodium chloride, 10 mL, Intravenous, Q12H  spironolactone, 25 mg, Oral, Daily  vitamin D3, 5,000 Units, Oral, Daily  vitamin E, 400 Units, Oral, Daily  Vortioxetine HBr, 20 mg, Oral, Daily             Assessment     Assessment:    Chest pain with negative troponins  ASCVD s/p CABG times 3/2017  Postoperative bradycardia.  Status status post pericardial window  Essential hypertension  Diabetes mellitus type 2  History of CVA  Obstructive sleep apnea on  BiPAP  Hyperlipidemia  Morbid obesity          Plan    Recommendations:  Patient has no further chest pain we will proceed with stress testing today for assessment of ischemia  Blood pressure still not well controlled but is better, will increase dose of lisinopril to 20 mg/day    I discussed the patient's findings and my recommendations with patient and family      Electronically signed by ANTONIO Coleman, 06/08/22, 9:23 AM EDT.  Electronically signed by Gurinder Walden MD, 06/08/22, 11:01 AM EDT.    Please note that portions of this note were completed with a voice recognition program.    Electronically signed by Gurinder Walden MD at 06/08/22 2847

## 2022-06-08 NOTE — H&P (VIEW-ONLY)
LOS: 1 day     Name: Denzel Harding  Age/Sex: 62 y.o. male  :  1960        PCP: Isaura Godoy MD  REF: No Known Provider    Principal Problem:    Chest pain, unspecified type      Reason for follow-up: Chest pain and history of coronary artery disease    Subjective     Subjective     Denzel Harding 62-year-old male with a past medical history significant for coronary artery disease status post CABG x3 with LAD endarterectomy, postop pericarditis with effusion status post left anterior thoracotomy and pericardial window in 2017, essential hypertension, diabetes mellitus type 2, history of CVA, COPD, obstructive sleep apnea and hyperlipidemia.  Patient states that he is on his way to a doctor's appointment this a.m. when he developed sudden onset chest pain in the middle of his chest.      Interval History: Patient scheduled for nuclear stress test today. Denies any chest pain. Blood pressure improved but still hypertensive.     Vital Signs  Temp:  [97.9 °F (36.6 °C)-98.6 °F (37 °C)] 97.9 °F (36.6 °C)  Heart Rate:  [82-99] 90  Resp:  [13-24] 20  BP: (110-178)/(65-92) 146/76     Vital Signs (last 72 hrs)       06/05 0700  06/06 0659 06/06 0700  / 0659 / 0700  /08 0659 /08 0700  / 0923   Most Recent      Temp (°F)     98.4 -  98.6      97.9     97.9 (36.6) /08 0800    Heart Rate     82 -  99       90 0628    Resp     13 -  24       20 / 0628    BP     110/65 -  189/91       146/76 /08 0600    SpO2 (%)     96 -  100       99 / 0628        Documented weights    22 0906 22 1358 22 0400   Weight: 132 kg (290 lb) (!) 140 kg (308 lb) (!) 139 kg (306 lb 11.2 oz)      Body mass index is 44.01 kg/m².    Intake/Output Summary (Last 24 hours) at 2022 09  Last data filed at 2022 0900  Gross per 24 hour   Intake --   Output 2200 ml   Net -2200 ml     Objective    Objective       Physical Exam:     General Appearance:    Alert, cooperative, in no acute  distress   Head:    Normocephalic, without obvious abnormality, atraumatic   Eyes:            Conjunctivae and sclerae normal, no   icterus, no pallor, corneas clear.   Neck:   No adenopathy, supple, trachea midline, no thyromegaly, no   carotid bruit, no JVD   Lungs:     Clear to auscultation,respirations regular, even and                  unlabored    Heart:    Regular rhythm and normal rate, normal S1 and S2, no            murmur, no gallop, no rub, no click   Chest Wall:    No abnormalities observed   Abdomen:     Normal bowel sounds, no masses, no organomegaly, soft        nontender, nondistended, no guarding, no rebound                tenderness   Extremities:   Moves all extremities well, no edema, no cyanosis, no             redness   Pulses:   Pulses palpable and equal bilaterally   Skin:   No bleeding, bruising or rash       Neurologic:   Alert and oriented      Results review       Results Review:   Results from last 7 days   Lab Units 06/08/22  0203 06/07/22  0919   WBC 10*3/mm3 10.08 8.90   HEMOGLOBIN g/dL 12.4* 13.1   PLATELETS 10*3/mm3 175 183     Results from last 7 days   Lab Units 06/08/22  0203 06/07/22  0919   SODIUM mmol/L 137 139   POTASSIUM mmol/L 4.2 3.6   CHLORIDE mmol/L 103 100   CO2 mmol/L 24.8 27.7   BUN mg/dL 11 8   CREATININE mg/dL 0.66* 0.79   CALCIUM mg/dL 9.1 9.4   GLUCOSE mg/dL 242* 118*   ALT (SGPT) U/L 37 40   AST (SGOT) U/L 30 45*     Results from last 7 days   Lab Units 06/08/22  0203 06/07/22 2019 06/07/22  1420 06/07/22  1118 06/07/22  0919   TROPONIN T ng/mL <0.010 <0.010 <0.010 <0.010 <0.010     Lab Results   Component Value Date    INR 1.05 02/27/2021    INR 0.98 06/17/2020    INR 1.01 10/15/2019    INR 0.97 01/28/2018    INR 1.1 01/28/2018    INR 1.1 12/17/2017    INR 0.97 08/16/2017     Lab Results   Component Value Date    MG 2.1 06/08/2022    MG 1.9 06/07/2022    MG 1.8 03/19/2022     Lab Results   Component Value Date    TSH 0.656 06/08/2022    CHLPL 123 07/01/2021     TRIG 100 06/08/2022    HDL 29 (L) 06/08/2022    LDL 49 06/08/2022      Imaging Results (Last 48 Hours)     Procedure Component Value Units Date/Time    XR Chest 1 View [[id number]] Collected: 06/07/22 0933     Updated: 06/07/22 0937    Narrative:      XR CHEST 1 VW-     CLINICAL INDICATION: Chest pain protocol        COMPARISON: None available      TECHNIQUE: Single frontal view of the chest.     FINDINGS:      LUNGS: Lungs are adequately aerated.      HEART AND MEDIASTINUM: Heart and mediastinal contours are unremarkable        SKELETON: Bony and soft tissue structures are unremarkable.             Impression:      No radiographic evidence of acute cardiac or pulmonary disease.     This report was finalized on 6/7/2022 9:34 AM by Dr. Abdifatah Shaffer MD.           Lab Results   Component Value Date    BNP 10.0 06/09/2018     Echo   Results for orders placed during the hospital encounter of 02/27/21    Adult Transesophageal Echo (HAMLET) W/ Cont if Necessary Per Protocol    Interpretation Summary  · Estimated left ventricular EF = 60% Left ventricular ejection fraction appears to be 56 - 60%. Left ventricular systolic function is normal.  · Saline test results are negative.  · The right atrial cavity is small in size.  · There is moderate calcification of the aortic valve mainly affecting the non-coronary cusp(s).     I reviewed the patient's new clinical results.    Telemetry: NSR 80-90 bpm      Medication Review:   vitamin C, 500 mg, Oral, Daily  aspirin, 81 mg, Oral, Daily  atorvastatin, 80 mg, Oral, Daily  budesonide-formoterol, 2 puff, Inhalation, BID - RT  cetirizine, 10 mg, Oral, Nightly  clopidogrel, 75 mg, Oral, Daily  enoxaparin, 40 mg, Subcutaneous, Q24H  gabapentin, 600 mg, Oral, Q8H  Insulin Regular Human (Conc), 150 Units, Subcutaneous, QAM AC  Insulin Regular Human (Conc), 150 Units, Subcutaneous, Daily Before Lunch  Insulin Regular Human (Conc), 150 Units, Subcutaneous, Daily Before Supper  labetalol, 10  mg, Intravenous, Once  lamoTRIgine, 100 mg, Oral, QAM  lamoTRIgine, 50 mg, Oral, Q PM  levETIRAcetam, 1,000 mg, Oral, BID  lisinopril, 5 mg, Oral, Nightly  metoprolol succinate XL, 200 mg, Oral, Q24H  nitroglycerin, 1 inch, Topical, Q6H  pantoprazole, 40 mg, Oral, QAM  sodium chloride, 10 mL, Intravenous, Q12H  spironolactone, 25 mg, Oral, Daily  vitamin D3, 5,000 Units, Oral, Daily  vitamin E, 400 Units, Oral, Daily  Vortioxetine HBr, 20 mg, Oral, Daily             Assessment      Assessment:    1. Chest pain with negative troponins  2. ASCVD s/p CABG times 3/2017  3. Postoperative bradycardia.  Status status post pericardial window  4. Essential hypertension  5. Diabetes mellitus type 2  6. History of CVA  7. Obstructive sleep apnea on BiPAP  8. Hyperlipidemia  9. Morbid obesity          Plan     Recommendations:  1. Patient has no further chest pain we will proceed with stress testing today for assessment of ischemia  2. Blood pressure still not well controlled but is better, will increase dose of lisinopril to 20 mg/day    I discussed the patient's findings and my recommendations with patient and family      Electronically signed by ANTONIO Coleman, 06/08/22, 9:23 AM EDT.  Electronically signed by Gurinder Walden MD, 06/08/22, 11:01 AM EDT.    Please note that portions of this note were completed with a voice recognition program.

## 2022-06-08 NOTE — CASE MANAGEMENT/SOCIAL WORK
Discharge Planning Assessment   Raj     Patient Name: Denzel Harding  MRN: [id number]  Today's Date: 6/8/2022    Admit Date: 6/7/2022     Discharge Needs Assessment    No documentation.                Discharge Plan     Row Name 06/08/22 1250       Plan    Plan Spoke with patient on this date. He lives at home with his wife, Markos and a 28 y/o dtr @ [address] [facility], [place], [place] and plans to return home at discharge. He has a BiPAP, O2 2L NC continuous, portable concentator via [employer]. He has a WC, RW & Q cane purchased. He has a Glucometer, Free style Radha 2, scales, BP cuff & pulse Ox.His PCP is Isaura Godoy . He does not have copay concerns with his [place] [facility] [employer] coverage. He utilizes "[facility] to get his medications. His family will provide transportation home on dc date. Currently denies Home Health or other needs. CM will follow. Plans to return home at [place] with family support.    Patient/Family in Agreement with Plan yes              Continued Care and Services - Admitted Since 6/7/2022    Coordination has not been started for this encounter.       Expected Discharge Date and Time     Expected Discharge Date Expected Discharge Time    Jun 9, 2022          Demographic Summary    No documentation.                Functional Status    No documentation.                Psychosocial    No documentation.                Abuse/Neglect    No documentation.                Legal    No documentation.                Substance Abuse    No documentation.                Patient Forms    No documentation.                   Ibeth Avitia RN

## 2022-06-08 NOTE — PROGRESS NOTES
Assisted By: Cristo TAYLOR    CC: Follow-up on chest pain    Interview History/HPI: Patient states he is having if any very low intensity chest discomfort this morning.  He states he has been wearing oxygen at home about a year.  He sees pulmonologist in [place].  Last July he had pulmonary function test into my viewing he had more of a restrictive type pattern.  He did have a CT of his chest in March at an outlying hospital which are reviewed the images, he had some patchy infiltrates but he states in 2002 he was working at a [employer] and inhaled a bunch of fumes and apparently had a Klebsiella pneumonia taking him into the hospital for over 40 days.  He states he has not worked since.    ROS:     Vitals:    06/08/22 1003   BP: 156/76   Pulse: 89   Resp: 13   Temp:    SpO2: 100%         Intake/Output Summary (Last 24 hours) at 6/8/2022 1052  Last data filed at 6/8/2022 0900  Gross per 24 hour   Intake --   Output 2200 ml   Net -2200 ml       EXAM: Temperature is 97.9, he is in no distress mood is good on 2 L with good saturations he can speak in full sentence without difficulty, not using  muscles to breathe, lungs have bilateral breath sounds are clear no rhonchi rales or wheezing heard, heart is a regular rate and rhythm, distant, no murmur, abdomen soft benign, no extremity edema noted.      EKG: Nonspecific image reviewed    LABS:     Lab Results (last 48 hours)     Procedure Component Value Units Date/Time    POC Glucose Once [[id number]]  (Abnormal) Collected: 06/08/22 0831    Specimen: Blood Updated: 06/08/22 0857     Glucose 196 mg/dL      Comment: Meter: [id number] : [id number] LILLIE GAYTANDLE       Magnesium [[id number]]  (Normal) Collected: 06/08/22 0743    Specimen: Blood Updated: 06/08/22 0839     Magnesium 2.1 mg/dL     Troponin [[id number]]  (Normal) Collected: 06/08/22 0203    Specimen: Blood Updated: 06/08/22 0251     Troponin T <0.010 ng/mL     Narrative:      Troponin T Reference  Range:  <= 0.03 ng/mL-   Negative for AMI  >0.03 ng/mL-     Abnormal for myocardial necrosis.  Clinicians would have to utilize clinical acumen, EKG, Troponin and serial changes to determine if it is an Acute Myocardial Infarction or myocardial injury due to an underlying chronic condition.       Results may be falsely decreased if patient taking Biotin.      TSH [[id number]]  (Normal) Collected: 06/08/22 0203    Specimen: Blood Updated: 06/08/22 0251     TSH 0.656 uIU/mL     Hemoglobin A1c [[id number]]  (Abnormal) Collected: 06/08/22 0203    Specimen: Blood Updated: 06/08/22 0246     Hemoglobin A1C 6.80 %     Narrative:      Hemoglobin A1C Ranges:    Increased Risk for Diabetes  5.7% to 6.4%  Diabetes                     >= 6.5%  Diabetic Goal                < 7.0%    Comprehensive Metabolic Panel [[id number]]  (Abnormal) Collected: 06/08/22 0203    Specimen: Blood Updated: 06/08/22 0245     Glucose 242 mg/dL      BUN 11 mg/dL      Creatinine 0.66 mg/dL      Sodium 137 mmol/L      Potassium 4.2 mmol/L      Chloride 103 mmol/L      CO2 24.8 mmol/L      Calcium 9.1 mg/dL      Total Protein 6.1 g/dL      Albumin 3.42 g/dL      ALT (SGPT) 37 U/L      AST (SGOT) 30 U/L      Alkaline Phosphatase 104 U/L      Total Bilirubin 0.5 mg/dL      Globulin 2.7 gm/dL      A/G Ratio 1.3 g/dL      BUN/Creatinine Ratio 16.7     Anion Gap 9.2 mmol/L      eGFR 106.7 mL/min/1.73      Comment: National Kidney Foundation and American Society of Nephrology (ASN) Task Force recommended calculation based on the Chronic Kidney Disease Epidemiology Collaboration (CKD-EPI) equation refit without adjustment for race.       Narrative:      GFR Normal >60  Chronic Kidney Disease <60  Kidney Failure <15      Lipid Panel [[id number]]  (Abnormal) Collected: 06/08/22 0203    Specimen: Blood Updated: 06/08/22 0245     Total Cholesterol 97 mg/dL      Triglycerides 100 mg/dL      HDL Cholesterol 29 mg/dL      LDL Cholesterol  49 mg/dL      VLDL Cholesterol  19 mg/dL      LDL/HDL Ratio 1.66    Narrative:      Cholesterol Reference Ranges  (U.S. Department of Health and Human Services ATP III Classifications)    Desirable          <200 mg/dL  Borderline High    200-239 mg/dL  High Risk          >240 mg/dL      Triglyceride Reference Ranges  (U.S. Department of Health and Human Services ATP III Classifications)    Normal           <150 mg/dL  Borderline High  150-199 mg/dL  High             200-499 mg/dL  Very High        >500 mg/dL    HDL Reference Ranges  (U.S. Department of Health and Human Services ATP III Classifications)    Low     <40 mg/dl (major risk factor for CHD)  High    >60 mg/dl ('negative' risk factor for CHD)        LDL Reference Ranges  (U.S. Department of Health and Human Services ATP III Classifications)    Optimal          <100 mg/dL  Near Optimal     100-129 mg/dL  Borderline High  130-159 mg/dL  High             160-189 mg/dL  Very High        >189 mg/dL    CBC & Differential [[id number]]  (Abnormal) Collected: 06/08/22 0203    Specimen: Blood Updated: 06/08/22 0222    Narrative:      The following orders were created for panel order CBC & Differential.  Procedure                               Abnormality         Status                     ---------                               -----------         ------                     CBC Auto Differential[[id number]]        Abnormal            Final result                 Please view results for these tests on the individual orders.    CBC Auto Differential [[id number]]  (Abnormal) Collected: 06/08/22 0203    Specimen: Blood Updated: 06/08/22 0222     WBC 10.08 10*3/mm3      RBC 4.78 10*6/mm3      Hemoglobin 12.4 g/dL      Hematocrit 37.4 %      MCV 78.2 fL      MCH 25.9 pg      MCHC 33.2 g/dL      RDW 14.0 %      RDW-SD 39.6 fl      MPV 9.8 fL      Platelets 175 10*3/mm3      Neutrophil % 82.8 %      Lymphocyte % 10.7 %      Monocyte % 5.3 %      Eosinophil % 0.0 %      Basophil % 0.2 %      Immature Grans %  1.0 %      Neutrophils, Absolute 8.35 10*3/mm3      Lymphocytes, Absolute 1.08 10*3/mm3      Monocytes, Absolute 0.53 10*3/mm3      Eosinophils, Absolute 0.00 10*3/mm3      Basophils, Absolute 0.02 10*3/mm3      Immature Grans, Absolute 0.10 10*3/mm3      nRBC 0.0 /100 WBC     Troponin [[id number]]  (Normal) Collected: 06/07/22 2019    Specimen: Blood Updated: 06/07/22 2104     Troponin T <0.010 ng/mL     Narrative:      Troponin T Reference Range:  <= 0.03 ng/mL-   Negative for AMI  >0.03 ng/mL-     Abnormal for myocardial necrosis.  Clinicians would have to utilize clinical acumen, EKG, Troponin and serial changes to determine if it is an Acute Myocardial Infarction or myocardial injury due to an underlying chronic condition.       Results may be falsely decreased if patient taking Biotin.      POC Glucose Once [[id number]]  (Abnormal) Collected: 06/07/22 1832    Specimen: Blood Updated: 06/07/22 1839     Glucose 322 mg/dL      Comment: Meter: [id number] : [id number] BRIAN OSHEAENS       Troponin [[id number]]  (Normal) Collected: 06/07/22 1420    Specimen: Blood Updated: 06/07/22 1554     Troponin T <0.010 ng/mL     Narrative:      Troponin T Reference Range:  <= 0.03 ng/mL-   Negative for AMI  >0.03 ng/mL-     Abnormal for myocardial necrosis.  Clinicians would have to utilize clinical acumen, EKG, Troponin and serial changes to determine if it is an Acute Myocardial Infarction or myocardial injury due to an underlying chronic condition.       Results may be falsely decreased if patient taking Biotin.      D-dimer, Quantitative [[id number]]  (Normal) Collected: 06/07/22 0919    Specimen: Blood Updated: 06/07/22 1552     D-Dimer, Quantitative 0.40 MCGFEU/mL     Narrative:      d-Dimer assay result is to be used in conjunction with a non-high clinical pretest probability (PTP) assessment tool to exclude PE and aid in diagnosis of VTE with cutoff of 0.5 MCGFEU/mL.    Magnesium [[id number]]  (Normal) Collected: 06/07/22  1118    Specimen: Blood from Hand, Right Updated: 06/07/22 1430     Magnesium 1.9 mg/dL     Troponin [[id number]]  (Normal) Collected: 06/07/22 1118    Specimen: Blood from Hand, Right Updated: 06/07/22 1144     Troponin T <0.010 ng/mL     Narrative:      Troponin T Reference Range:  <= 0.03 ng/mL-   Negative for AMI  >0.03 ng/mL-     Abnormal for myocardial necrosis.  Clinicians would have to utilize clinical acumen, EKG, Troponin and serial changes to determine if it is an Acute Myocardial Infarction or myocardial injury due to an underlying chronic condition.       Results may be falsely decreased if patient taking Biotin.      COVID-19 and FLU A/B PCR - Swab, Nasopharynx [[id number]]  (Normal) Collected: 06/07/22 1047    Specimen: Swab from Nasopharynx Updated: 06/07/22 1143     COVID19 Not Detected     Influenza A PCR Not Detected     Influenza B PCR Not Detected    Narrative:      Fact sheet for providers: https://www.fda.gov/media/691252/download    Fact sheet for patients: https://www.fda.gov/media/782993/download    Test performed by PCR.    [place] Draw [[id number]] Collected: 06/07/22 0919    Specimen: Blood Updated: 06/07/22 1033    Narrative:      The following orders were created for panel order [place] Draw.  Procedure                               Abnormality         Status                     ---------                               -----------         ------                     Green Top (Gel)[[id number]]                                  Final result               Lavender Top[[id number]]                                     Final result               Gold Top - SST[[id number]]                                   Final result               Light Blue Top[[id number]]                                   Final result                 Please view results for these tests on the individual orders.    Lavender Top [[id number]] Collected: 06/07/22 0919    Specimen: Blood Updated: 06/07/22 1033     Extra Tube hold for  add-on     Comment: Auto resulted       Gold Top - SST [[id number]] Collected: 06/07/22 0919    Specimen: Blood Updated: 06/07/22 1033     Extra Tube Hold for add-ons.     Comment: Auto resulted.       Light Blue Top [[id number]] Collected: 06/07/22 0919    Specimen: Blood Updated: 06/07/22 1033     Extra Tube Hold for add-ons.     Comment: Auto resulted       Green Top (Gel) [[id number]] Collected: 06/07/22 0919    Specimen: Blood Updated: 06/07/22 1033     Extra Tube Hold for add-ons.     Comment: Auto resulted.       Troponin [[id number]]  (Normal) Collected: 06/07/22 0919    Specimen: Blood Updated: 06/07/22 0947     Troponin T <0.010 ng/mL     Narrative:      Troponin T Reference Range:  <= 0.03 ng/mL-   Negative for AMI  >0.03 ng/mL-     Abnormal for myocardial necrosis.  Clinicians would have to utilize clinical acumen, EKG, Troponin and serial changes to determine if it is an Acute Myocardial Infarction or myocardial injury due to an underlying chronic condition.       Results may be falsely decreased if patient taking Biotin.      Comprehensive Metabolic Panel [[id number]]  (Abnormal) Collected: 06/07/22 0919    Specimen: Blood Updated: 06/07/22 0945     Glucose 118 mg/dL      BUN 8 mg/dL      Creatinine 0.79 mg/dL      Sodium 139 mmol/L      Potassium 3.6 mmol/L      Chloride 100 mmol/L      CO2 27.7 mmol/L      Calcium 9.4 mg/dL      Total Protein 6.6 g/dL      Albumin 3.84 g/dL      ALT (SGPT) 40 U/L      AST (SGOT) 45 U/L      Alkaline Phosphatase 112 U/L      Total Bilirubin 0.5 mg/dL      Globulin 2.8 gm/dL      A/G Ratio 1.4 g/dL      BUN/Creatinine Ratio 10.1     Anion Gap 11.3 mmol/L      eGFR 101.1 mL/min/1.73      Comment: National Kidney Foundation and American Society of Nephrology (ASN) Task Force recommended calculation based on the Chronic Kidney Disease Epidemiology Collaboration (CKD-EPI) equation refit without adjustment for race.       Narrative:      GFR Normal >60  Chronic Kidney Disease  <60  Kidney Failure <15      CBC & Differential [[id number]]  (Abnormal) Collected: 06/07/22 0919    Specimen: Blood Updated: 06/07/22 0926    Narrative:      The following orders were created for panel order CBC & Differential.  Procedure                               Abnormality         Status                     ---------                               -----------         ------                     CBC Auto Differential[[id number]]        Abnormal            Final result                 Please view results for these tests on the individual orders.    CBC Auto Differential [[id number]]  (Abnormal) Collected: 06/07/22 0919    Specimen: Blood Updated: 06/07/22 0926     WBC 8.90 10*3/mm3      RBC 5.09 10*6/mm3      Hemoglobin 13.1 g/dL      Hematocrit 40.5 %      MCV 79.6 fL      MCH 25.7 pg      MCHC 32.3 g/dL      RDW 14.4 %      RDW-SD 41.3 fl      MPV 9.7 fL      Platelets 183 10*3/mm3      Neutrophil % 70.7 %      Lymphocyte % 18.8 %      Monocyte % 6.5 %      Eosinophil % 2.7 %      Basophil % 0.6 %      Immature Grans % 0.7 %      Neutrophils, Absolute 6.30 10*3/mm3      Lymphocytes, Absolute 1.67 10*3/mm3      Monocytes, Absolute 0.58 10*3/mm3      Eosinophils, Absolute 0.24 10*3/mm3      Basophils, Absolute 0.05 10*3/mm3      Immature Grans, Absolute 0.06 10*3/mm3      nRBC 0.0 /100 WBC                Radiology:    Imaging Results (Last 72 Hours)     Procedure Component Value Units Date/Time    XR Chest 1 View [[id number]] Collected: 06/07/22 0933     Updated: 06/07/22 0937    Narrative:      XR CHEST 1 VW-     CLINICAL INDICATION: Chest pain protocol        COMPARISON: None available      TECHNIQUE: Single frontal view of the chest.     FINDINGS:      LUNGS: Lungs are adequately aerated.      HEART AND MEDIASTINUM: Heart and mediastinal contours are unremarkable        SKELETON: Bony and soft tissue structures are unremarkable.             Impression:      No radiographic evidence of acute cardiac or pulmonary  disease.     This report was finalized on 6/7/2022 9:34 AM by Dr. Abdifatah Shaffer MD.             Results for orders placed during the hospital encounter of 02/27/21    Adult Transesophageal Echo (HAMLET) W/ Cont if Necessary Per Protocol    Interpretation Summary  · Estimated left ventricular EF = 60% Left ventricular ejection fraction appears to be 56 - 60%. Left ventricular systolic function is normal.  · Saline test results are negative.  · The right atrial cavity is small in size.  · There is moderate calcification of the aortic valve mainly affecting the non-coronary cusp(s).      Assessment/Plan:   Chest pain, typical in nature for unstable angina but with negative troponins and nonspecific EKG in a patient with previous coronary artery bypass grafting.  Patient has been seen by cardiology, patient to undergo stress test today.  Further decision about disposition based on stress test.  I did check a D-dimer that is normal.  It is noted patient postop pericarditis after bypass and had an LAD endarterectomy.  He also had a left anterior thoracotomy and pericardial effusion post bypass as well.  Patient is on DAPT as well as statin and metoprolol as well as nitroglycerin paste.  Chest pain is certainly improved over admission.  Echo also pending.    Chronic hypoxic respiratory failure, stable    DVT prophylaxis, subcu Lovenox    Morbid obesity by BMI overall affecting his ability to mobilize    Diabetes type 2 insulin requiring, A1c is good, he takes massive amounts of insulin but he does have a freestyle emily that checks his glucose and this seems to be accurate hours.    Hypertension, borderline control, follow on home medications and adjust as needed    Disposition Home    Denys Johns MD

## 2022-06-08 NOTE — PROGRESS NOTES
Discussed with , stress test positive, needs involvement with interventional cardiology, he will have elodia NP for cardiology consult interventional cardiology in the a.m.  I have asked the nurse to notify the patient that he will be staying through the night for possible cardiac catheterization.  We will keep him n.p.o.

## 2022-06-09 PROBLEM — R94.39 ABNORMAL NUCLEAR STRESS TEST: Status: ACTIVE | Noted: 2022-01-01

## 2022-06-09 NOTE — PLAN OF CARE
Goal Outcome Evaluation:              Outcome Evaluation: VSS at this time. Patient had heart cath today, no stents placed. Site checks and vitals monitored per order. Site assessment noted to be clean, dry, and intact. Advancing diet as tolerated. Patient c/o back, groin pain, and headache upon return to PCU after procedure. PRN pain medications given per order. No other complaints at this time, will continue to monitor.

## 2022-06-09 NOTE — PROGRESS NOTES
Assisted By: Dwaine TAYLOR    CC: Follow-up on chest pain and coronary disease    Interview History/HPI: Patient states he is having no pain, stress test positive, he is awaiting cardiac catheterization today.  He is in agreement with this, he states his breathing is doing okay, he did tolerate his diet after his stress test yesterday and glucoses have been acceptable, he has his freestyle emily in place.    ROS:     Vitals:    06/09/22 1002   BP: 142/77   Pulse: 79   Resp: 12   Temp:    SpO2: 99%         Intake/Output Summary (Last 24 hours) at 6/9/2022 1205  Last data filed at 6/9/2022 0400  Gross per 24 hour   Intake --   Output 700 ml   Net -700 ml       EXAM: Temperature 98.7, patient is in no distress, not use accessory muscles of breathe, pleasant, lungs are clear, heart regular rate and rhythm, no significant edema.      EKG:     Tele: Sinus rhythm    LABS:     Lab Results (last 48 hours)     Procedure Component Value Units Date/Time    POC Glucose Once [[id number]]  (Normal) Collected: 06/09/22 1115    Specimen: Blood Updated: 06/09/22 1125     Glucose 107 mg/dL      Comment: Meter: [id number] : [id number] PATRICIA ROBERTS       POC Glucose Once [[id number]]  (Normal) Collected: 06/09/22 0614    Specimen: Blood Updated: 06/09/22 0703     Glucose 118 mg/dL      Comment: Meter: [id number] : [id number] RAJ HERNANDEZ       POC Glucose Once [[id number]]  (Abnormal) Collected: 06/08/22 2104    Specimen: Blood Updated: 06/08/22 2111     Glucose 233 mg/dL      Comment: Meter: [id number] : [id number] RAJ DAVID       POC Glucose Once [[id number]]  (Abnormal) Collected: 06/08/22 1635    Specimen: Blood Updated: 06/08/22 1705     Glucose 264 mg/dL      Comment: Meter: [id number] : [id number] LILLIE IRENE       POC Glucose Once [[id number]]  (Abnormal) Collected: 06/08/22 1338    Specimen: Blood Updated: 06/08/22 1351     Glucose 207 mg/dL      Comment: Meter: [id number] : [id number] LILLIE IRENE        POC Glucose Once [[id number]]  (Abnormal) Collected: 06/08/22 0831    Specimen: Blood Updated: 06/08/22 0857     Glucose 196 mg/dL      Comment: Meter: [id number] : [id number] LILLIE BONILLA       Magnesium [[id number]]  (Normal) Collected: 06/08/22 0743    Specimen: Blood Updated: 06/08/22 0839     Magnesium 2.1 mg/dL     Troponin [[id number]]  (Normal) Collected: 06/08/22 0203    Specimen: Blood Updated: 06/08/22 0251     Troponin T <0.010 ng/mL     Narrative:      Troponin T Reference Range:  <= 0.03 ng/mL-   Negative for AMI  >0.03 ng/mL-     Abnormal for myocardial necrosis.  Clinicians would have to utilize clinical acumen, EKG, Troponin and serial changes to determine if it is an Acute Myocardial Infarction or myocardial injury due to an underlying chronic condition.       Results may be falsely decreased if patient taking Biotin.      TSH [[id number]]  (Normal) Collected: 06/08/22 0203    Specimen: Blood Updated: 06/08/22 0251     TSH 0.656 uIU/mL     Hemoglobin A1c [[id number]]  (Abnormal) Collected: 06/08/22 0203    Specimen: Blood Updated: 06/08/22 0246     Hemoglobin A1C 6.80 %     Narrative:      Hemoglobin A1C Ranges:    Increased Risk for Diabetes  5.7% to 6.4%  Diabetes                     >= 6.5%  Diabetic Goal                < 7.0%    Comprehensive Metabolic Panel [[id number]]  (Abnormal) Collected: 06/08/22 0203    Specimen: Blood Updated: 06/08/22 0245     Glucose 242 mg/dL      BUN 11 mg/dL      Creatinine 0.66 mg/dL      Sodium 137 mmol/L      Potassium 4.2 mmol/L      Chloride 103 mmol/L      CO2 24.8 mmol/L      Calcium 9.1 mg/dL      Total Protein 6.1 g/dL      Albumin 3.42 g/dL      ALT (SGPT) 37 U/L      AST (SGOT) 30 U/L      Alkaline Phosphatase 104 U/L      Total Bilirubin 0.5 mg/dL      Globulin 2.7 gm/dL      A/G Ratio 1.3 g/dL      BUN/Creatinine Ratio 16.7     Anion Gap 9.2 mmol/L      eGFR 106.7 mL/min/1.73      Comment: National Kidney Foundation and American Society of Nephrology  (ASN) Task Force recommended calculation based on the Chronic Kidney Disease Epidemiology Collaboration (CKD-EPI) equation refit without adjustment for race.       Narrative:      GFR Normal >60  Chronic Kidney Disease <60  Kidney Failure <15      Lipid Panel [[id number]]  (Abnormal) Collected: 06/08/22 0203    Specimen: Blood Updated: 06/08/22 0245     Total Cholesterol 97 mg/dL      Triglycerides 100 mg/dL      HDL Cholesterol 29 mg/dL      LDL Cholesterol  49 mg/dL      VLDL Cholesterol 19 mg/dL      LDL/HDL Ratio 1.66    Narrative:      Cholesterol Reference Ranges  (U.S. Department of Health and Human Services ATP III Classifications)    Desirable          <200 mg/dL  Borderline High    200-239 mg/dL  High Risk          >240 mg/dL      Triglyceride Reference Ranges  (U.S. Department of Health and Human Services ATP III Classifications)    Normal           <150 mg/dL  Borderline High  150-199 mg/dL  High             200-499 mg/dL  Very High        >500 mg/dL    HDL Reference Ranges  (U.S. Department of Health and Human Services ATP III Classifications)    Low     <40 mg/dl (major risk factor for CHD)  High    >60 mg/dl ('negative' risk factor for CHD)        LDL Reference Ranges  (U.S. Department of Health and Human Services ATP III Classifications)    Optimal          <100 mg/dL  Near Optimal     100-129 mg/dL  Borderline High  130-159 mg/dL  High             160-189 mg/dL  Very High        >189 mg/dL    CBC & Differential [[id number]]  (Abnormal) Collected: 06/08/22 0203    Specimen: Blood Updated: 06/08/22 0222    Narrative:      The following orders were created for panel order CBC & Differential.  Procedure                               Abnormality         Status                     ---------                               -----------         ------                     CBC Auto Differential[[id number]]        Abnormal            Final result                 Please view results for these tests on the individual  orders.    CBC Auto Differential [[id number]]  (Abnormal) Collected: 06/08/22 0203    Specimen: Blood Updated: 06/08/22 0222     WBC 10.08 10*3/mm3      RBC 4.78 10*6/mm3      Hemoglobin 12.4 g/dL      Hematocrit 37.4 %      MCV 78.2 fL      MCH 25.9 pg      MCHC 33.2 g/dL      RDW 14.0 %      RDW-SD 39.6 fl      MPV 9.8 fL      Platelets 175 10*3/mm3      Neutrophil % 82.8 %      Lymphocyte % 10.7 %      Monocyte % 5.3 %      Eosinophil % 0.0 %      Basophil % 0.2 %      Immature Grans % 1.0 %      Neutrophils, Absolute 8.35 10*3/mm3      Lymphocytes, Absolute 1.08 10*3/mm3      Monocytes, Absolute 0.53 10*3/mm3      Eosinophils, Absolute 0.00 10*3/mm3      Basophils, Absolute 0.02 10*3/mm3      Immature Grans, Absolute 0.10 10*3/mm3      nRBC 0.0 /100 WBC     Troponin [[id number]]  (Normal) Collected: 06/07/22 2019    Specimen: Blood Updated: 06/07/22 2104     Troponin T <0.010 ng/mL     Narrative:      Troponin T Reference Range:  <= 0.03 ng/mL-   Negative for AMI  >0.03 ng/mL-     Abnormal for myocardial necrosis.  Clinicians would have to utilize clinical acumen, EKG, Troponin and serial changes to determine if it is an Acute Myocardial Infarction or myocardial injury due to an underlying chronic condition.       Results may be falsely decreased if patient taking Biotin.      POC Glucose Once [[id number]]  (Abnormal) Collected: 06/07/22 1832    Specimen: Blood Updated: 06/07/22 1839     Glucose 322 mg/dL      Comment: Meter: [id number] : [id number] BRIAN OSHEAENS       Troponin [[id number]]  (Normal) Collected: 06/07/22 1420    Specimen: Blood Updated: 06/07/22 1554     Troponin T <0.010 ng/mL     Narrative:      Troponin T Reference Range:  <= 0.03 ng/mL-   Negative for AMI  >0.03 ng/mL-     Abnormal for myocardial necrosis.  Clinicians would have to utilize clinical acumen, EKG, Troponin and serial changes to determine if it is an Acute Myocardial Infarction or myocardial injury due to an underlying chronic  condition.       Results may be falsely decreased if patient taking Biotin.      D-dimer, Quantitative [[id number]]  (Normal) Collected: 06/07/22 0919    Specimen: Blood Updated: 06/07/22 1552     D-Dimer, Quantitative 0.40 MCGFEU/mL     Narrative:      d-Dimer assay result is to be used in conjunction with a non-high clinical pretest probability (PTP) assessment tool to exclude PE and aid in diagnosis of VTE with cutoff of 0.5 MCGFEU/mL.    Magnesium [[id number]]  (Normal) Collected: 06/07/22 1118    Specimen: Blood from Hand, Right Updated: 06/07/22 1430     Magnesium 1.9 mg/dL                Radiology:    Imaging Results (Last 72 Hours)     Procedure Component Value Units Date/Time    XR Chest 1 View [[id number]] Collected: 06/07/22 0933     Updated: 06/07/22 0937    Narrative:      XR CHEST 1 VW-     CLINICAL INDICATION: Chest pain protocol        COMPARISON: None available      TECHNIQUE: Single frontal view of the chest.     FINDINGS:      LUNGS: Lungs are adequately aerated.      HEART AND MEDIASTINUM: Heart and mediastinal contours are unremarkable        SKELETON: Bony and soft tissue structures are unremarkable.             Impression:      No radiographic evidence of acute cardiac or pulmonary disease.     This report was finalized on 6/7/2022 9:34 AM by Dr. Abdifatah Shaffer MD.             Results for orders placed during the hospital encounter of 06/07/22    Adult Transthoracic Echo Complete W/ Cont if Necessary Per Protocol    Interpretation Summary  · Left ventricular ejection fraction appears to be 61 - 65%. Left ventricular systolic function is normal.  · Left ventricular diastolic function is consistent with (grade II w/high LAP) pseudonormalization.  · This is a technically limited study.      Assessment/Plan:   Chest pain consistent with unstable angina in conjunction with stress test that was positive for possible anterior ischemia.  Continue DAPT, continue Lipitor, nitroglycerin, beta-blocker.   Discussed with [facility] cardiology NP, plan for cardiac catheterization today.  Lipid status is acceptable.    Diabetes, insulin requiring, he is on high doses of U-500, control, follow    DVT prophylaxis, DVT prophylaxis, subcu Lovenox    Hypertension, controlled, follow-up    History of CVA, stable    Disposition Home    Denys Johns MD

## 2022-06-09 NOTE — CONSULTS
"Diabetes Education  Assessment/Teaching    Patient Name:  Denzel Harding  YOB: 1960  MRN: [id number]  Admit Date:  6/7/2022      Assessment Date:  6/9/2022  Flowsheet Row Most Recent Value   General Information     Height 177.8 cm (70\")   Height Method Stated   Weight 137 kg (301 lb)   Weight Method Bed scale   Pregnancy Assessment    Diabetes History    Education Preferences    Nutrition Information    Assessment Topics    DM Goals           Flowsheet Row Most Recent Value   DM Education Needs    Meter Has own   Meter Type Other (comment)  [Chiara CGM]   Frequency of Testing Other (comment)  [Chiara CGM]   Medication Insulin   Problem Solving Hypoglycemia, Hyperglycemia, Sick days, Signs, Symptoms, Treatment   Reducing Risks Cardiovascular, Immunizations, Foot care, Dental exam, Eye exam, Blood pressure, Lipids, A1C testing, Neuropathy, Retinopathy   Healthy Eating Basic meal plan provided   Physical Activity Walking   Physical Activity Frequency Regularly   Healthy Coping Appropriate   Discharge Plan Follow-up with PCP   Motivation Moderate, Engaged   Teaching Method Explanation, Discussion, Handouts   Patient Response Verbalized understanding            Other Comments:  A1C 6.8 Patient did not wear a mask. Patient was seen, educated and received AADE7 and ADA handouts on diet, activity, checking blood glucose, taking medication as prescribed, checking feet daily and S/S of hypo/hyperglycemia. Patient was educated on sick rule days. Patient had no questions or concerns. Please re-consult or call for concerns or questions. Thank you.        Electronically signed by:  Toya Rea RN  06/09/22 13:43 EDT  "

## 2022-06-09 NOTE — PROGRESS NOTES
LOS: 2 days     Name: Denzel Harding  Age/Sex: 62 y.o. male  :  1960        PCP: Isaura Godoy MD  REF: No Known Provider    Principal Problem:    Chest pain, unspecified type       Reason for follow-up: Chest pain and coronary artery disease    Subjective     Subjective     Denzel Harding 62-year-old male with a past medical history significant for coronary artery disease status post CABG x3 with LAD endarterectomy, postop pericarditis with effusion status post left anterior thoracotomy and pericardial window in 2017, essential hypertension, diabetes mellitus type 2, history of CVA, COPD, obstructive sleep apnea and hyperlipidemia.  Patient states that he is on his way to a doctor's appointment this a.m. when he developed sudden onset chest pain in the middle of his chest.      Interval History: Patient underwent nuclear stress test yesterday that showed mild fixed mid anterior wall defect with significant mid anterior, distal anterior, apical and apical septal wall ischemia.  Patient is scheduled for cardiac catheterization today.  Continues to report intermittent chest pain.    Vital Signs  Temp:  [98 °F (36.7 °C)-98.7 °F (37.1 °C)] 98.7 °F (37.1 °C)  Heart Rate:  [77-88] 79  Resp:  [8-22] 12  BP: (120-177)/(52-82) 142/77     Vital Signs (last 72 hrs)        0700  06/ 0659 06/ 0700  06/08 0659  0700   0659  0700   1052   Most Recent      Temp (°F)   98.4 -  98.6    97.9 -  98.5      98.7     98.7 (37.1) /09 0800    Heart Rate   82 -  99    77 -  89    79 -  82     79 / 1002    Resp   13 -  24    8 -  22    12 -  16     12  1002    BP   110/65 -  189/91    120/62 -  177/80    137/68 -  144/65     142/77  1002    SpO2 (%)   96 -  100    94 -  100    99 -  100     99  1002        Documented weights    22 0906 22 1358 22 0400 22 0400   Weight: 132 kg (290 lb) (!) 140 kg (308 lb) (!) 139 kg (306 lb 11.2 oz) (!) 137 kg (301 lb)       Body mass index is 43.19 kg/m².    Intake/Output Summary (Last 24 hours) at 6/9/2022 1052  Last data filed at 6/9/2022 0400  Gross per 24 hour   Intake --   Output 700 ml   Net -700 ml     Objective    Objective       Physical Exam:     General Appearance:    Alert, cooperative, in no acute distress   Head:    Normocephalic, without obvious abnormality, atraumatic   Eyes:            Conjunctivae and sclerae normal, no   icterus, no pallor, corneas clear.   Neck:   No adenopathy, supple, trachea midline, no thyromegaly, no   carotid bruit, no JVD   Lungs:     Clear to auscultation,respirations regular, even and                  unlabored    Heart:    Regular rhythm and normal rate, normal S1 and S2, no            murmur, no gallop, no rub, no click   Chest Wall:    No abnormalities observed   Abdomen:     Normal bowel sounds, no masses, no organomegaly, soft        nontender, nondistended, no guarding, no rebound                tenderness   Extremities:   Moves all extremities well, no edema, no cyanosis, no             redness   Pulses:   Pulses palpable and equal bilaterally   Skin:   No bleeding, bruising or rash       Neurologic:   Alert and oriented      Results review       Results Review:   Results from last 7 days   Lab Units 06/08/22  0203 06/07/22  0919   WBC 10*3/mm3 10.08 8.90   HEMOGLOBIN g/dL 12.4* 13.1   PLATELETS 10*3/mm3 175 183     Results from last 7 days   Lab Units 06/08/22  0203 06/07/22  0919   SODIUM mmol/L 137 139   POTASSIUM mmol/L 4.2 3.6   CHLORIDE mmol/L 103 100   CO2 mmol/L 24.8 27.7   BUN mg/dL 11 8   CREATININE mg/dL 0.66* 0.79   CALCIUM mg/dL 9.1 9.4   GLUCOSE mg/dL 242* 118*   ALT (SGPT) U/L 37 40   AST (SGOT) U/L 30 45*     Results from last 7 days   Lab Units 06/08/22  0203 06/07/22  2019 06/07/22  1420 06/07/22  1118 06/07/22  0919   TROPONIN T ng/mL <0.010 <0.010 <0.010 <0.010 <0.010     Lab Results   Component Value Date    INR 1.05 02/27/2021    INR 0.98 06/17/2020    INR  1.01 10/15/2019    INR 0.97 01/28/2018    INR 1.1 01/28/2018    INR 1.1 12/17/2017    INR 0.97 08/16/2017     Lab Results   Component Value Date    MG 2.1 06/08/2022    MG 1.9 06/07/2022    MG 1.8 03/19/2022     Lab Results   Component Value Date    TSH 0.656 06/08/2022    CHLPL 123 07/01/2021    TRIG 100 06/08/2022    HDL 29 (L) 06/08/2022    LDL 49 06/08/2022      Imaging Results (Last 48 Hours)     ** No results found for the last 48 hours. **        Lab Results   Component Value Date    BNP 10.0 06/09/2018     Echo   Results for orders placed during the hospital encounter of 06/07/22    Adult Transthoracic Echo Complete W/ Cont if Necessary Per Protocol    Interpretation Summary  · Left ventricular ejection fraction appears to be 61 - 65%. Left ventricular systolic function is normal.  · Left ventricular diastolic function is consistent with (grade II w/high LAP) pseudonormalization.  · This is a technically limited study.     I reviewed the patient's new clinical results.    Telemetry: NSR 70-80 bpm      Medication Review:   vitamin C, 500 mg, Oral, Daily  aspirin, 81 mg, Oral, Daily  atorvastatin, 80 mg, Oral, Daily  budesonide-formoterol, 2 puff, Inhalation, BID - RT  cetirizine, 10 mg, Oral, Nightly  clopidogrel, 75 mg, Oral, Daily  enoxaparin, 40 mg, Subcutaneous, Q24H  gabapentin, 600 mg, Oral, Q8H  Insulin Regular Human (Conc), 150 Units, Subcutaneous, Daily Before Lunch  Insulin Regular Human (Conc), 150 Units, Subcutaneous, Daily Before Supper  [START ON 6/10/2022] Insulin Regular Human (Conc), 150 Units, Subcutaneous, QAM AC  labetalol, 10 mg, Intravenous, Once  lamoTRIgine, 100 mg, Oral, QAM  lamoTRIgine, 50 mg, Oral, Q PM  levETIRAcetam, 1,000 mg, Oral, BID  lisinopril, 20 mg, Oral, Nightly  metoprolol succinate XL, 200 mg, Oral, Q24H  nitroglycerin, 1 inch, Topical, Q6H  pantoprazole, 40 mg, Oral, QAM  sodium chloride, 10 mL, Intravenous, Q12H  spironolactone, 25 mg, Oral, Daily  vitamin D3, 5,000  Units, Oral, Daily  vitamin E, 400 Units, Oral, Daily  Vortioxetine HBr, 20 mg, Oral, Daily             Assessment      Assessment:  Chest pain with abnormal nuclear stress test, troponin negative   ASCVD status post CABG in 2017  Essential hypertension  Hyperlipidemia, on statin    Plan     Recommendations:  Chest pain  Nuclear stress test showed mild fixed mid anterior wall defect with significant mid anterior, distal anterior, apical and apical septal wall ischemia.  Patient continues to have intermittent chest pain.  Given his history of coronary artery disease we will proceed with left heart catheterization to rule out significant ischemia.  I have discussed with the patient about the procedure of cardiac catheterization, potential risks, benefits and alternative methods of management.  Patient expressed understanding of the same and is wanting to proceed.   Echocardiogram showed normal LV function.  Continue with aspirin, Plavix, lisinopril, and statin.  Addendum   6 PM- reviewed cardiac catheterization findings.  Severe native vessel coronary artery disease with patent LIMA to LAD and disease in SVG grafts with no targets for PCI and recommended medical therapy.  Added isosorbide mononitrate and patient is already on aspirin plus Plavix, statin, metoprolol and lisinopril.        I discussed the patient's findings and my recommendations with patient and family      Electronically signed by ANTONIO Coleman, 06/09/22, 10:52 AM EDT.    Please note that portions of this note were completed with a voice recognition program.

## 2022-06-09 NOTE — PLAN OF CARE
Goal Outcome Evaluation:  Pt alert and oriented x 4. VSS and afebrile this shift. Pt on 2L NC tolerating well. No complaints at this time. Pt NPO since midnight per order. Bed is locked in low position with call light in reach.

## 2022-06-10 NOTE — PROGRESS NOTES
"CC: Follow-up on coronary artery disease.    Interview History/HPI: Patient states he is still having some intermittent chest discomfort, he states he \"just does not feel right in his chest\".  He states he had a vessel by the heart cath that was blocked but nothing could be done and he is not feeling \"comfortable with this\".  He had a heavily calcified LAD distal to the LIMA tie in the was not thought to be a good target.  He is tolerating his diet.  He did have borderline low glucose but this is improved now when I saw him about 830 this was in the 70s.    ROS:     Vitals:    06/10/22 1328   BP: 128/62   Pulse: 76   Resp:    Temp:    SpO2: 98%         Intake/Output Summary (Last 24 hours) at 6/10/2022 1356  Last data filed at 6/10/2022 1222  Gross per 24 hour   Intake 959.96 ml   Output 1775 ml   Net -815.04 ml       EXAM: Temperature 98.1, lungs have bilateral breath sounds are clear no rhonchi rales or wheezing heard, heart regular rate and rhythm without murmur gallop, abdomen soft benign, extremities are without edema.      EKG:     Tele: Sinus rhythm    LABS:     Lab Results (last 48 hours)     Procedure Component Value Units Date/Time    POC Glucose Once [[id number]]  (Abnormal) Collected: 06/10/22 1040    Specimen: Blood Updated: 06/10/22 1051     Glucose 152 mg/dL      Comment: Meter: [id number] : [id number] [employer]       POC Glucose Once [[id number]]  (Normal) Collected: 06/10/22 0713    Specimen: Blood Updated: 06/10/22 0720     Glucose 76 mg/dL      Comment: Meter: [id number] : [id number] [employer]       POC Glucose Once [[id number]]  (Abnormal) Collected: 06/10/22 0544    Specimen: Blood Updated: 06/10/22 0556     Glucose 68 mg/dL      Comment: Meter: [id number] : [id number] RAJ HERNANDEZ       Comprehensive Metabolic Panel [[id number]]  (Abnormal) Collected: 06/10/22 0021    Specimen: Blood Updated: 06/10/22 0058     Glucose 141 mg/dL      BUN 10 mg/dL      Creatinine 0.75 mg/dL     "  Sodium 138 mmol/L      Potassium 3.8 mmol/L      Comment: Slight hemolysis detected by analyzer. Results may be affected.        Chloride 104 mmol/L      CO2 22.3 mmol/L      Calcium 8.6 mg/dL      Total Protein 5.4 g/dL      Albumin 3.04 g/dL      ALT (SGPT) 37 U/L      AST (SGOT) 37 U/L      Alkaline Phosphatase 84 U/L      Total Bilirubin 0.4 mg/dL      Globulin 2.4 gm/dL      A/G Ratio 1.3 g/dL      BUN/Creatinine Ratio 13.3     Anion Gap 11.7 mmol/L      eGFR 102.7 mL/min/1.73      Comment: National Kidney Foundation and American Society of Nephrology (ASN) Task Force recommended calculation based on the Chronic Kidney Disease Epidemiology Collaboration (CKD-EPI) equation refit without adjustment for race.       Narrative:      GFR Normal >60  Chronic Kidney Disease <60  Kidney Failure <15      CBC & Differential [[id number]]  (Abnormal) Collected: 06/10/22 0021    Specimen: Blood Updated: 06/10/22 0047    Narrative:      The following orders were created for panel order CBC & Differential.  Procedure                               Abnormality         Status                     ---------                               -----------         ------                     CBC Auto Differential[[id number]]        Abnormal            Final result                 Please view results for these tests on the individual orders.    CBC Auto Differential [[id number]]  (Abnormal) Collected: 06/10/22 0021    Specimen: Blood Updated: 06/10/22 0047     WBC 7.05 10*3/mm3      RBC 4.47 10*6/mm3      Hemoglobin 11.5 g/dL      Hematocrit 37.4 %      MCV 83.7 fL      MCH 25.7 pg      MCHC 30.7 g/dL      RDW 14.5 %      RDW-SD 43.9 fl      MPV 9.8 fL      Platelets 156 10*3/mm3      Neutrophil % 63.7 %      Lymphocyte % 23.8 %      Monocyte % 7.1 %      Eosinophil % 4.1 %      Basophil % 0.7 %      Immature Grans % 0.6 %      Neutrophils, Absolute 4.49 10*3/mm3      Lymphocytes, Absolute 1.68 10*3/mm3      Monocytes, Absolute 0.50 10*3/mm3       Eosinophils, Absolute 0.29 10*3/mm3      Basophils, Absolute 0.05 10*3/mm3      Immature Grans, Absolute 0.04 10*3/mm3      nRBC 0.0 /100 WBC     POC Glucose Once [[id number]]  (Abnormal) Collected: 06/09/22 2112    Specimen: Blood Updated: 06/09/22 2125     Glucose 179 mg/dL      Comment: Meter: [id number] : [id number] RAJ DAVID       POC Glucose Once [[id number]]  (Abnormal) Collected: 06/09/22 1720    Specimen: Blood Updated: 06/09/22 1728     Glucose 251 mg/dL      Comment: Meter: [id number] : [id number] ANDALYN ARMANDO       POC Glucose Once [[id number]]  (Normal) Collected: 06/09/22 1115    Specimen: Blood Updated: 06/09/22 1125     Glucose 107 mg/dL      Comment: Meter: [id number] : [id number] ANDALYN ARMANDO       POC Glucose Once [[id number]]  (Normal) Collected: 06/09/22 0614    Specimen: Blood Updated: 06/09/22 0703     Glucose 118 mg/dL      Comment: Meter: [id number] : [id number] RAJ DAVID       POC Glucose Once [[id number]]  (Abnormal) Collected: 06/08/22 2104    Specimen: Blood Updated: 06/08/22 2111     Glucose 233 mg/dL      Comment: Meter: [id number] : [id number] RAJ DAVID       POC Glucose Once [[id number]]  (Abnormal) Collected: 06/08/22 1635    Specimen: Blood Updated: 06/08/22 1705     Glucose 264 mg/dL      Comment: Meter: [id number] : [id number] [facility]                  Radiology:    Imaging Results (Last 72 Hours)     ** No results found for the last 72 hours. **      Chest x-ray negative on admission    Results for orders placed during the hospital encounter of 06/07/22    Adult Transthoracic Echo Complete W/ Cont if Necessary Per Protocol    Interpretation Summary  · Left ventricular ejection fraction appears to be 61 - 65%. Left ventricular systolic function is normal.  · Left ventricular diastolic function is consistent with (grade II w/high LAP) pseudonormalization.  · This is a technically limited study.      Assessment/Plan:   Chest discomfort,  consistent with angina, cardiac catheterization results noted.  Medications are being optimized by cardiology, discussed with Maria Elena NP with cardiology.  Patient is on DAPT as well as now Imdur, Ranexa, he is also on beta-blocker, and statin.  I am attempting to contact his thoracic surgeon and asked him to review the patient's films as well.  We will observe over the next 24 hours.    Diabetes, patient is on high doses of U-500 insulin, glucose overall has been controlled, he has a freestyle emily in place that he can check his glucoses, follow-up.    Hypertension, controlled    DVT prophylaxis, patient is on subcu Lovenox    Dyslipidemia, lipid status is excellent.    Disposition Home    Denys Johns MD

## 2022-06-10 NOTE — PLAN OF CARE
Goal Outcome Evaluation:              Outcome Evaluation: VSS at this time. Patient resting comfortably in bed. Heart cath performed on 6/9/22, site checked, clean, dry, intact. Blood glucose monitored per order. Patient voices no complaints at this time. Safety maintained, will continue to monitor.

## 2022-06-10 NOTE — PLAN OF CARE
Goal Outcome Evaluation:   Pt alert and oriented x 4. VSS and afebrile this shift. Pt on 2L NC. No complaints of pain during this shift. Pt's right femoral site is clean dry and intact. Bed is locked in low position with call light in reach.

## 2022-06-10 NOTE — CASE MANAGEMENT/SOCIAL WORK
Continued Stay Note  AMANDA Anthony     Patient Name: Denzel Harding  MRN: [id number]  Today's Date: 6/10/2022    Admit Date: 6/7/2022     Discharge Plan     Row Name 06/10/22 1228       Plan    Plan Patient lives at home with his wife and plans to return home at discharge. BiPAP, O2 2L NC, portable concentrator is provided via [facility]. Family will provide transportation home at ID. CM will follow and assist as needed.    Patient/Family in Agreement with Plan yes    Plan Comments                Discharge Codes    No documentation.               Expected Discharge Date and Time     Expected Discharge Date Expected Discharge Time    Jun 12, 2022             Ibeth Avitia RN

## 2022-06-10 NOTE — PROGRESS NOTES
LOS: 3 days     Name: Denzel Harding  Age/Sex: 62 y.o. male  :  1960        PCP: Isaura Godoy MD  REF: No Known Provider    Principal Problem:    Chest pain, unspecified type  Active Problems:    Abnormal nuclear stress test      Reason for follow-up: Chest pain     Subjective     Subjective     Denzel Harding 62-year-old male with a past medical history significant for coronary artery disease status post CABG x3 with LAD endarterectomy, postop pericarditis with effusion status post left anterior thoracotomy and pericardial window in 2017, essential hypertension, diabetes mellitus type 2, history of CVA, COPD, obstructive sleep apnea and hyperlipidemia.  Patient states that he is on his way to a doctor's appointment this a.m. when he developed sudden onset chest pain in the middle of his chest.      Interval History: Patient continues to report intermittent chest pressure. Left heart cath yesterday showed severe native disease with patient LIMA to LAD and diseases SVG with no noted good targets for intervention. Blood pressure stable.     Vital Signs  Temp:  [98.6 °F (37 °C)-99.5 °F (37.5 °C)] 99.5 °F (37.5 °C)  Heart Rate:  [] 79  Resp:  [10-19] 14  BP: ()/(42-77) 129/61     Vital Signs (last 72 hrs)        0700  06/08 0659  0700   0659  0700  06/10 0659 06/10 0700  06/10 1002   Most Recent      Temp (°F) 98.4 -  98.6    97.9 -  98.5    98.6 -  99.5       99.5 (37.5) 06/10 0500    Heart Rate 82 -  99    77 -  89    72 -  105    75 -  81     79 /10 0902    Resp 13 -  24    8 -  22    10 -  19    14 -  16     14 06/10 0902    /65 -  189/91    120/62 -  177/80    94/59 -  146/77    129/61 -  133/59     129/61 06/10 0902    SpO2 (%) 96 -  100    94 -  100    95 -  100    97 -  99     98 06/10 0902        Documented weights    22 0906 22 1358 22 0400 22 0400   Weight: 132 kg (290 lb) (!) 140 kg (308 lb) (!) 139 kg (306 lb 11.2 oz) (!) 137  kg (301 lb)    06/10/22 0500   Weight: (!) 141 kg (310 lb 6.4 oz)      Body mass index is 44.54 kg/m².    Intake/Output Summary (Last 24 hours) at 6/10/2022 1002  Last data filed at 6/10/2022 0724  Gross per 24 hour   Intake 239.96 ml   Output 1775 ml   Net -1535.04 ml     Objective    Objective       Physical Exam:     General Appearance:    Alert, cooperative, in no acute distress   Head:    Normocephalic, without obvious abnormality, atraumatic   Eyes:            Conjunctivae and sclerae normal, no   icterus, no pallor, corneas clear.   Neck:   No adenopathy, supple, trachea midline, no thyromegaly, no   carotid bruit, no JVD   Lungs:     Clear to auscultation,respirations regular, even and                  unlabored    Heart:    Regular rhythm and normal rate, normal S1 and S2, no            murmur, no gallop, no rub, no click   Chest Wall:    No abnormalities observed   Abdomen:     Normal bowel sounds, no masses, no organomegaly, soft        nontender, nondistended, no guarding, no rebound                tenderness   Extremities:   Moves all extremities well, no edema, no cyanosis, no             redness   Pulses:   Pulses palpable and equal bilaterally   Skin:   No bleeding, bruising or rash       Neurologic:   Alert and oriented      Results review       Results Review:   Results from last 7 days   Lab Units 06/10/22  0021 06/08/22  0203 06/07/22  0919   WBC 10*3/mm3 7.05 10.08 8.90   HEMOGLOBIN g/dL 11.5* 12.4* 13.1   PLATELETS 10*3/mm3 156 175 183     Results from last 7 days   Lab Units 06/10/22  0021 06/08/22  0203 06/07/22  0919   SODIUM mmol/L 138 137 139   POTASSIUM mmol/L 3.8 4.2 3.6   CHLORIDE mmol/L 104 103 100   CO2 mmol/L 22.3 24.8 27.7   BUN mg/dL 10 11 8   CREATININE mg/dL 0.75* 0.66* 0.79   CALCIUM mg/dL 8.6 9.1 9.4   GLUCOSE mg/dL 141* 242* 118*   ALT (SGPT) U/L 37 37 40   AST (SGOT) U/L 37 30 45*     Results from last 7 days   Lab Units 06/08/22  0203 06/07/22  2019 06/07/22  1429  06/07/22  1118 06/07/22  0919   TROPONIN T ng/mL <0.010 <0.010 <0.010 <0.010 <0.010     Lab Results   Component Value Date    INR 1.05 02/27/2021    INR 0.98 06/17/2020    INR 1.01 10/15/2019    INR 0.97 01/28/2018    INR 1.1 01/28/2018    INR 1.1 12/17/2017    INR 0.97 08/16/2017     Lab Results   Component Value Date    MG 2.1 06/08/2022    MG 1.9 06/07/2022    MG 1.8 03/19/2022     Lab Results   Component Value Date    TSH 0.656 06/08/2022    CHLPL 123 07/01/2021    TRIG 100 06/08/2022    HDL 29 (L) 06/08/2022    LDL 49 06/08/2022      Imaging Results (Last 48 Hours)     ** No results found for the last 48 hours. **        Lab Results   Component Value Date    BNP 10.0 06/09/2018     Echo   Results for orders placed during the hospital encounter of 06/07/22    Adult Transthoracic Echo Complete W/ Cont if Necessary Per Protocol    Interpretation Summary  · Left ventricular ejection fraction appears to be 61 - 65%. Left ventricular systolic function is normal.  · Left ventricular diastolic function is consistent with (grade II w/high LAP) pseudonormalization.  · This is a technically limited study.     I reviewed the patient's new clinical results.    Telemetry: Normal sinus rhythm 70-80 bpm      Medication Review:   vitamin C, 500 mg, Oral, Daily  aspirin, 81 mg, Oral, Daily  atorvastatin, 80 mg, Oral, Daily  budesonide-formoterol, 2 puff, Inhalation, BID - RT  cetirizine, 10 mg, Oral, Nightly  clopidogrel, 75 mg, Oral, Daily  enoxaparin, 40 mg, Subcutaneous, Q24H  gabapentin, 600 mg, Oral, Q8H  Insulin Regular Human (Conc), 150 Units, Subcutaneous, Daily Before Lunch  Insulin Regular Human (Conc), 150 Units, Subcutaneous, Daily Before Supper  Insulin Regular Human (Conc), 150 Units, Subcutaneous, QAM AC  isosorbide mononitrate, 30 mg, Oral, Q24H  labetalol, 10 mg, Intravenous, Once  lamoTRIgine, 100 mg, Oral, QAM  lamoTRIgine, 50 mg, Oral, Q PM  levETIRAcetam, 1,000 mg, Oral, BID  lisinopril, 20 mg, Oral,  Nightly  metoprolol succinate XL, 200 mg, Oral, Q24H  pantoprazole, 40 mg, Oral, QAM  sodium chloride, 10 mL, Intravenous, Q12H  spironolactone, 25 mg, Oral, Daily  vitamin D3, 5,000 Units, Oral, Daily  vitamin E, 400 Units, Oral, Daily  Vortioxetine HBr, 20 mg, Oral, Daily             Assessment      Assessment:  Coronary artery disease status post bypass  Essential hypertension  Type 2 diabetes  Sleep apnea  Hyperlipidemia  COPD        Plan     Recommendations:  Cardiac.  Patient with history of coronary disease with bypass surgery about 6 years back.  Had redo cath this admission and medical management was advised.  Will consider going up on his nitrates and start on Ranexa also.  Patient wants to go back and see his CT surgeon at [facility] in [place].  Further options like atherectomy can be considered if an option.  Patient said he is going to discuss with his regular cardiologist as an outpatient.    I discussed the patient's findings and my recommendations with patient and family      Electronically signed by ANTONIO Coleman, 06/10/22, 10:02 AM EDT.  Electronically signed by Lili Barajas MD, 06/10/22, 4:19 PM EDT.    Please note that portions of this note were completed with a voice recognition program.

## 2022-06-11 NOTE — PLAN OF CARE
Goal Outcome Evaluation:  Plan of Care Reviewed With: patient        Progress: no change  Outcome Evaluation: Pt A&Ox4, VSS, afebrile, on 2L NC. Right femoral LHC site clean, dry and intact. Pt has c/o intermittent chest pain with one occurrence of chest pain rating a 6/10 that was radiating down the left arm and through to his back. STAT EKG and Troponin ordered per protocol, MD made aware. Pt is currently resting in bed, call light in reach, bed in low position, will continue to monitor.

## 2022-06-11 NOTE — PROGRESS NOTES
[facility] CROSS COVER NOTE    Patient Identification:  Name:  Denzel Harding  Age:  62 y.o.  Sex:  male  :  1960  MRN:  [id number]  Visit number:  [id number]  Primary Care Provider:  Isaura Godoy MD    Length of stay in inpatient status:  4    Brief Update     Called about the patient having constant severe substernal chest pain.  Patient has already had a heart cath this admission and there were no targets amenable for stenting/angioplasty and it was thought that there was perhaps not a good target for any further bypasses.  Patient was given 3 sublingual nitroglycerin with no effect.  Stat troponin remains negative.  Stat EKG, which I have looked at, and compared to other EKGs dated 2022 and 2022, does not show any differences.  I will try a GI cocktail for this patient.  If the GI cocktail does not help his chest pain, then I have instructed the nursing staff to call the on-call cardiologist.  Patient's blood pressures may tolerate a continuous nitroglycerin drip but I am not sure at this time.  I will follow-up with the nursing staff on cardiology recommendations.    Angelina Green MD  [facility] Hospitalist  22  06:15 EDT

## 2022-06-11 NOTE — PLAN OF CARE
Goal Outcome Evaluation:  Plan of Care Reviewed With: patient   Pt alert and oriented. VSS at this time. 2LNC. R femoral site clean dry and intact. Pt has c/o chest pain with one occurrence being 6/10. STAT EKG and troponin ordered per protocol, 1 dose nitro SL. Cardiology notified. No other complaints. Call light within reach. Will continue to monitor.      Progress: no change

## 2022-06-11 NOTE — PROGRESS NOTES
LOS: 4 days     Name: Denzel Harding  Age/Sex: 62 y.o. male  :  1960        PCP: Isaura Godoy MD    Principal Problem:    Chest pain, unspecified type  Active Problems:    Abnormal nuclear stress test      Admission Information: Denzel Harding 62-year-old male with a past medical history significant for coronary artery disease status post CABG x3 with LAD endarterectomy, postop pericarditis with effusion status post left anterior thoracotomy and pericardial window in 2017, essential hypertension, diabetes mellitus type 2, history of CVA, COPD, obstructive sleep apnea and hyperlipidemia.  Patient states that he is on his way to a doctor's appointment this a.m. when he developed sudden onset chest pain in the middle of his chest.      Following for: coronary artery disease      Interval history: still having chest pains today and through the night intermittently. Denies worsening shortness of breath from baseline.     Subjective     ROS    Vital Signs  Vitals:    22 1003 22 1103 22 1150 22 1203   BP: 124/66 131/73  132/68   BP Location: Left arm Left arm  Left arm   Patient Position: Lying Lying  Lying   Pulse: 79 77  82   Resp: 8 10  12   Temp:   98.4 °F (36.9 °C)    TempSrc:   Axillary    SpO2: 99% 99%  98%   Weight:       Height:         Body mass index is 44.65 kg/m².      Intake/Output Summary (Last 24 hours) at 2022 1338  Last data filed at 2022 0600  Gross per 24 hour   Intake 1040 ml   Output 3625 ml   Net -2585 ml       Constitutional:       General: Not in acute distress.     Appearance: Healthy appearance. Well-developed and not in distress. Not diaphoretic.   Eyes:      Conjunctiva/sclera: Conjunctivae normal.      Pupils: Pupils are equal, round, and reactive to light.   HENT:      Head: Normocephalic and atraumatic.   Neck:      Vascular: No carotid bruit or JVD.   Pulmonary:      Effort: Pulmonary effort is normal. No respiratory distress.      Breath  sounds: Normal breath sounds.   Cardiovascular:      Normal rate. Regular rhythm.   Edema:     Peripheral edema absent.   Skin:     General: Skin is cool.   Neurological:      Mental Status: Alert, oriented to person, place, and time and oriented to person, place and time.         Results Review:     Results from last 7 days   Lab Units 06/11/22  0038 06/10/22  0021 06/08/22  0203 06/07/22  0919   WBC 10*3/mm3 6.75 7.05 10.08 8.90   HEMOGLOBIN g/dL 11.6* 11.5* 12.4* 13.1   PLATELETS 10*3/mm3 142 156 175 183     Results from last 7 days   Lab Units 06/11/22  0326 06/10/22  0021 06/08/22  0203 06/07/22  0919   SODIUM mmol/L 141 138 137 139   POTASSIUM mmol/L 4.3 3.8 4.2 3.6   CHLORIDE mmol/L 104 104 103 100   CO2 mmol/L 27.7 22.3 24.8 27.7   BUN mg/dL 8 10 11 8   CREATININE mg/dL 0.70* 0.75* 0.66* 0.79   CALCIUM mg/dL 9.3 8.6 9.1 9.4   GLUCOSE mg/dL 129* 141* 242* 118*   ALT (SGPT) U/L  --  37 37 40   AST (SGOT) U/L  --  37 30 45*     Results from last 7 days   Lab Units 06/11/22  0429 06/08/22  0203 06/07/22  2019 06/07/22  1420 06/07/22  1118   TROPONIN T ng/mL <0.010 <0.010 <0.010 <0.010 <0.010             I reviewed the patient's new clinical results.  I reviewed the patient's new imaging results and agree with the interpretation.  I personally viewed and interpreted the patient's EKG/Telemetry data    Medication Review:   vitamin C, 500 mg, Oral, Daily  aspirin, 81 mg, Oral, Daily  atorvastatin, 80 mg, Oral, Daily  budesonide-formoterol, 2 puff, Inhalation, BID - RT  cetirizine, 10 mg, Oral, Nightly  clopidogrel, 75 mg, Oral, Daily  enoxaparin, 40 mg, Subcutaneous, Q24H  gabapentin, 600 mg, Oral, Q8H  Insulin Regular Human (Conc), 150 Units, Subcutaneous, Daily Before Lunch  Insulin Regular Human (Conc), 150 Units, Subcutaneous, Daily Before Supper  Insulin Regular Human (Conc), 150 Units, Subcutaneous, QAM AC  isosorbide mononitrate, 60 mg, Oral, Q24H  labetalol, 10 mg, Intravenous, Once  lamoTRIgine, 100 mg,  Oral, QAM  lamoTRIgine, 50 mg, Oral, Q PM  levETIRAcetam, 1,000 mg, Oral, BID  lisinopril, 20 mg, Oral, Nightly  metoprolol succinate XL, 200 mg, Oral, Q24H  pantoprazole, 40 mg, Oral, QAM  ranolazine, 500 mg, Oral, Q12H  sodium chloride, 10 mL, Intravenous, Q12H  sodium chloride, 10 mL, Intravenous, Q12H  spironolactone, 25 mg, Oral, Daily  vitamin D3, 5,000 Units, Oral, Daily  vitamin E, 400 Units, Oral, Daily  Vortioxetine HBr, 20 mg, Oral, Daily           Assessment:  1. Coronary artery disease status post bypass surgery in the past  2. Hypertension  3.  type 2 diabetes  4. Stroke  5. Hyperlipidemia      Recommendations:  1. Cardiac.  Patient with abnormal stress and stress post cath.  Patient was noted to have significant disease in his native coronaries beyond his grafts.  Films were reviewed by interventional cardiology at [facility] also as well as an [facility] and medical management was advised.  We will go up on his ranolazine and nitrates for now.  If patient is still symptomatic then can consider going for a second opinion to [facility].  2. Aggressive risk factor modification coronary disease to continue    I discussed the patients findings and my recommendations with patient and family    Aftab Gregorio PA-C  06/11/22  13:38 EDT  Electronically signed by Lili Barajas MD, 06/11/22, 2:35 PM EDT.

## 2022-06-12 NOTE — PLAN OF CARE
Goal Outcome Evaluation:  Plan of Care Reviewed With: patient   Pt. Alert and oriented. VSS at this time on 2LNC. Metoprolol, Ranexa, and IMDUR held this AM due to low BP. Pt has had no new complaints of chest pain this shift. Call light is within reach. Will continue to monitor.      Progress: no change

## 2022-06-12 NOTE — PROGRESS NOTES
LOS: 5 days     Name: Denzel Harding  Age/Sex: 62 y.o. male  :  1960        PCP: Isaura Godoy MD    Principal Problem:    Chest pain, unspecified type  Active Problems:    Abnormal nuclear stress test      Following for: Resistant angina    Interval history: Patient resting in bed he does report chest pain has improved denies any chest pains this morning.  Denies any worsening shortness of breath from baseline.  Discussed the case again with interventional cardiologist, Dr. Coppola who is willing to see patient in outpatient setting and if remains symptomatic with anginal symptoms may consider high risk PCI.    Subjective     ROS    Vital Signs  Vitals:    22 1403 22 1432 22 1440 22 1503   BP: 126/66 106/51  123/60   BP Location: Left arm   Left arm   Patient Position: Lying   Lying   Pulse: 74  71 71   Resp: 17   16   Temp:       TempSrc:       SpO2: 99%  99% 100%   Weight:       Height:         Body mass index is 44.42 kg/m².      Intake/Output Summary (Last 24 hours) at 2022 1543  Last data filed at 2022 1203  Gross per 24 hour   Intake 2020 ml   Output 2700 ml   Net -680 ml       Constitutional:       General: Not in acute distress.     Appearance: Healthy appearance. Well-developed and not in distress. Not diaphoretic.   Eyes:      Conjunctiva/sclera: Conjunctivae normal.      Pupils: Pupils are equal, round, and reactive to light.   HENT:      Head: Normocephalic and atraumatic.   Neck:      Vascular: No carotid bruit or JVD.   Pulmonary:      Effort: Pulmonary effort is normal. No respiratory distress.      Breath sounds: Normal breath sounds.   Cardiovascular:      Normal rate. Regular rhythm.   Edema:     Peripheral edema absent.   Skin:     General: Skin is cool.   Neurological:      Mental Status: Alert, oriented to person, place, and time and oriented to person, place and time.         Results Review:     Results from last 7 days   Lab Units 22  0050  06/11/22  0038 06/10/22  0021 06/08/22  0203 06/07/22  0919   WBC 10*3/mm3 8.55 6.75 7.05 10.08 8.90   HEMOGLOBIN g/dL 11.7* 11.6* 11.5* 12.4* 13.1   PLATELETS 10*3/mm3 179 142 156 175 183     Results from last 7 days   Lab Units 06/12/22  0050 06/11/22  0326 06/10/22  0021 06/08/22  0203 06/07/22  0919   SODIUM mmol/L 138 141 138 137 139   POTASSIUM mmol/L 4.5 4.3 3.8 4.2 3.6   CHLORIDE mmol/L 103 104 104 103 100   CO2 mmol/L 27.3 27.7 22.3 24.8 27.7   BUN mg/dL 9 8 10 11 8   CREATININE mg/dL 0.73* 0.70* 0.75* 0.66* 0.79   CALCIUM mg/dL 9.1 9.3 8.6 9.1 9.4   GLUCOSE mg/dL 103* 129* 141* 242* 118*   ALT (SGPT) U/L 29  --  37 37 40   AST (SGOT) U/L 26  --  37 30 45*     Results from last 7 days   Lab Units 06/11/22  1649 06/11/22  0429 06/08/22  0203 06/07/22  2019 06/07/22  1420   TROPONIN T ng/mL <0.010 <0.010 <0.010 <0.010 <0.010             I reviewed the patient's new clinical results.  I reviewed the patient's new imaging results and agree with the interpretation.  I personally viewed and interpreted the patient's EKG/Telemetry data    Medication Review:   vitamin C, 500 mg, Oral, Daily  aspirin, 81 mg, Oral, Daily  atorvastatin, 80 mg, Oral, Daily  budesonide-formoterol, 2 puff, Inhalation, BID - RT  cetirizine, 10 mg, Oral, Nightly  clopidogrel, 75 mg, Oral, Daily  enoxaparin, 40 mg, Subcutaneous, Q24H  gabapentin, 600 mg, Oral, Q8H  Insulin Regular Human (Conc), 150 Units, Subcutaneous, Daily Before Lunch  Insulin Regular Human (Conc), 150 Units, Subcutaneous, Daily Before Supper  Insulin Regular Human (Conc), 150 Units, Subcutaneous, QAM AC  isosorbide mononitrate, 120 mg, Oral, Q24H  labetalol, 10 mg, Intravenous, Once  lamoTRIgine, 100 mg, Oral, QAM  lamoTRIgine, 50 mg, Oral, Q PM  levETIRAcetam, 1,000 mg, Oral, BID  lisinopril, 20 mg, Oral, Nightly  metoprolol succinate XL, 200 mg, Oral, Q24H  pantoprazole, 40 mg, Oral, QAM  ranolazine, 1,000 mg, Oral, Q12H  sodium chloride, 10 mL, Intravenous,  Q12H  sodium chloride, 10 mL, Intravenous, Q12H  spironolactone, 25 mg, Oral, Daily  vitamin D3, 5,000 Units, Oral, Daily  vitamin E, 400 Units, Oral, Daily  Vortioxetine HBr, 20 mg, Oral, Daily           Assessment:  1. Coronary artery disease s/p recent cath  2. History of bypass surgery in the past  3. Type 2 diabetes  4. Stroke  5. History of hyperlipidemia    Recommendations:  1. Cardiac.  Patient with history of coronary disease with bypass surgery in the past patient has significant coronary artery disease in his native vessels beyond his LIMA.  Native right coronary artery disease noted also.  Patient is on Ranexa and nitrates.  We will consider going up on his metoprolol for heart rate around 60.  will consider going up on his beta-blockers to bring the heart rate around 60.  Patient is going to follow-up with Dr arriaga, interventional cardiology at [facility] post discharge for review of his cath and looking at options for PCI.  2. Hypotension.  Patient blood pressure was running on the low side this morning and his isosorbide mononitrate and metoprolol were held medication was held by nursing staff.  Will cut back on his lisinopril to 5 mg daily and restart his nitrates and beta-blockers.  3. Discharge plans as per primary.    I discussed the patients findings and my recommendations with patient and family    Aftab Gregorio PA-C  06/12/22  15:43 EDT  Electronically signed by Lili Barajas MD, 06/12/22, 4:36 PM EDT.

## 2022-06-12 NOTE — PLAN OF CARE
Goal Outcome Evaluation:  Plan of Care Reviewed With: patient        Progress: no change  Outcome Evaluation: Pt is AOx4, VSS, afebrile, on 2 L NC. Right femoral LHC site is CDI. Pt has c/o intermittent chest pain during the shift rating it a 3/10, no intervention needed. Pt has his home CPAP machine on. Pt is resting in bed with eyes closed and verbalizes no complaints. Call light in reach, bed in low position, will continue to monitor.

## 2022-06-12 NOTE — PROGRESS NOTES
"    [facility] HOSPITALIST PROGRESS NOTE     Patient Identification:  Name:  Denzel Harding  Age:  62 y.o.  Sex:  male  :  1960  MRN:  [id number]  Visit Number:  [id number]  ROOM: [address]     Primary Care Provider:  Isaura Godoy MD    Length of stay in inpatient status:  5    Subjective     Chief Compliant:    Chief Complaint   Patient presents with   • Chest Pain   • Shortness of Breath     History of Presenting Illness:    Patient remains ill but stable today, no acute events overnight, no new complaints, denies any fevers or chills, patient endorses some mild persisting chest \"discomfort\" but no pain, heart rate lower and bradycardic, blood pressure borderline low this AM, Nurse has held AM medications, updated cards recommendations pending, glucose controlled, may require transfer but awaiting updated Cardiology recommendations.   Objective     Current Hospital Meds:  vitamin C, 500 mg, Oral, Daily  aspirin, 81 mg, Oral, Daily  atorvastatin, 80 mg, Oral, Daily  budesonide-formoterol, 2 puff, Inhalation, BID - RT  cetirizine, 10 mg, Oral, Nightly  clopidogrel, 75 mg, Oral, Daily  enoxaparin, 40 mg, Subcutaneous, Q24H  gabapentin, 600 mg, Oral, Q8H  Insulin Regular Human (Conc), 150 Units, Subcutaneous, Daily Before Lunch  Insulin Regular Human (Conc), 150 Units, Subcutaneous, Daily Before Supper  Insulin Regular Human (Conc), 150 Units, Subcutaneous, QAM AC  isosorbide mononitrate, 120 mg, Oral, Q24H  labetalol, 10 mg, Intravenous, Once  lamoTRIgine, 100 mg, Oral, QAM  lamoTRIgine, 50 mg, Oral, Q PM  levETIRAcetam, 1,000 mg, Oral, BID  lisinopril, 20 mg, Oral, Nightly  metoprolol succinate XL, 200 mg, Oral, Q24H  pantoprazole, 40 mg, Oral, QAM  ranolazine, 1,000 mg, Oral, Q12H  sodium chloride, 10 mL, Intravenous, Q12H  sodium chloride, 10 mL, Intravenous, Q12H  spironolactone, 25 mg, Oral, Daily  vitamin D3, 5,000 Units, Oral, Daily  vitamin E, 400 Units, Oral, Daily  Vortioxetine HBr, 20 " mg, Oral, Daily         ----------------------------------------------------------------------------------------------------------------------  Vital Signs:  Temp:  [97.7 °F (36.5 °C)-98.4 °F (36.9 °C)] 98 °F (36.7 °C)  Heart Rate:  [68-82] 71  Resp:  [8-21] 17  BP: ()/(49-84) 106/60  SpO2:  [91 %-99 %] 98 %  on  Flow (L/min):  [2] 2;   Device (Oxygen Therapy): nasal cannula  Body mass index is 44.42 kg/m².      Intake/Output Summary (Last 24 hours) at 6/12/2022 1036  Last data filed at 6/12/2022 0600  Gross per 24 hour   Intake 1740 ml   Output 2700 ml   Net -960 ml      ----------------------------------------------------------------------------------------------------------------------  Physical exam:  Constitutional:  older than stated age, No acute distress.      HENT:  Head:  Normocephalic and atraumatic.  Mouth:  Moist mucous membranes.    Eyes:  Conjunctivae and EOM are normal. No scleral icterus.    Neck:  Neck supple.  No JVD present.    Cardiovascular:  Normal rate, regular rhythm and normal heart sounds with no murmur.  Pulmonary/Chest:  No respiratory distress, no wheezes, on 2LNC  Abdominal:  Soft. No distension and no tenderness.   Musculoskeletal:  No tenderness, and no deformity.  No red or swollen joints anywhere.    Neurological:  Alert and oriented to person, place, and time.  No gross focal deficits   Skin:  Skin is warm and dry. No rash noted. No pallor.   Peripheral vascular:  No clubbing, no cyanosis, no edema.  Psychiatric: Appropriate mood and affect    Edited by: Cornelius Nielsen MD at 6/12/2022 1034  ----------------------------------------------------------------------------------------------------------------------  CBC - WBC/Hgb/Hct/Plts: 8.55/11.7*/36.5*/179 (06/12 0050)  CHEM - BUN/Cr/glu/ALT/AST/amyl/lip:9/0.73*/103*/29/26/--/-- (06/12 0050)  LYTES - Na/K/Cl/CO2: 138/4.5/103/27.3 (06/12 0050)     No results found for: URINECX  No results found for: BLOODCX    I have personally  looked at the labs and they are summarized above.  ----------------------------------------------------------------------------------------------------------------------  Detailed radiology reports for the last 24 hours:  No radiology results for the last day  Assessment & Plan    62M Former Smoker, Morbid Obesity by BMI PMH Anxiety, Depression, COPD, Obstructive Sleep Apnea, Cerebrovascular Accident, Diabetes Mellitus Type II, Hypertension, Dyslipidemia, Coronary Artery Disease status post PCI x 2 followed by 3V CABG 2017, now admitted with chest pain and shortness of breath.     #Unstable Angina/Typical Chest pain  #Hypertension/Dyslipidemia/Coronary Artery Disease status post PCI x 2 followed by 3V CABG 2017  #Chronic HFpEF  #History Postop Pericarditis status post pericardial window 2017  #Prolonged Qtc  #Insulin Dependent Diabetes Mellitus Type II, uncontrolled, unknown complications  #Chronic Hypoxic Respiratory Failure due to COPD/Emphysema without acute exacerbation, Asthma without acute exacerbation, Obstructive Sleep Apnea   #History Cerebrovascular Accident   #Anxiety/Depression  #Former Smoker  #Morbid Obesity by BMI    Cardiology consulted and following, had recommended high risk PCI and discussed with [facility] who deferred transfer and recommended medical management, currently on beta blocker, ACEI, Imdur, Ranexa, Aspirin 81, plavix and statin, some medications held this AM due to bradycardia and borderline hypotension, updated Cardiology recommendations pending but note 6/11 mentioned would consider second opinion with , follow up updated cards recommendations.  Glucose controlled, on U-500 insulin, cover FSBG and SSI as well.  Blood pressure controlled. Other home medications continued and stable.    F: Oral  E: Monitor & Replace PRN  N: Diet Regular; Cardiac, Consistent Carbohydrate  PPx: Prophylactic Lovenox   Code Status (Patient has no pulse and is not breathing): CPR (Attempt  to Resuscitate)  Medical Interventions (Patient has pulse or is breathing): Full Support     Dispo: Pending workup and clinical improvement, pending need for transfer or not    *This patient is considered high risk secondary to unstable angina, typical chest pain.     Edited by: Cornelius Nielsen MD at 6/12/2022 1036  [facility]

## 2022-06-13 NOTE — PROGRESS NOTES
[facility] HOSPITALIST PROGRESS NOTE     Patient Identification:  Name:  Denzel Harding  Age:  62 y.o.  Sex:  male  :  1960  MRN:  [id number]  Visit Number:  [id number]  ROOM: [address]     Primary Care Provider:  Isaura Godoy MD    Length of stay in inpatient status:  6    Subjective     Chief Compliant:    Chief Complaint   Patient presents with   • Chest Pain   • Shortness of Breath       History of Presenting Illness:    Patient denies any new complaints. He does report chest tightness may be slightly worse today. Dyspnea about the same. O2 requirement stable on 2L NC. Patient noted he has had multiple PCIs in the past and if offered with potential benefit, his choice would be to try intervention rather than just medical management.     ROS:  Otherwise 10 point ROS negative other than documented above in HPI.     Objective     Current Hospital Meds:vitamin C, 500 mg, Oral, Daily  aspirin, 81 mg, Oral, Daily  atorvastatin, 80 mg, Oral, Daily  budesonide-formoterol, 2 puff, Inhalation, BID - RT  cetirizine, 10 mg, Oral, Nightly  clopidogrel, 75 mg, Oral, Daily  enoxaparin, 40 mg, Subcutaneous, Q24H  gabapentin, 600 mg, Oral, Q8H  Insulin Regular Human (Conc), 150 Units, Subcutaneous, Daily Before Lunch  Insulin Regular Human (Conc), 150 Units, Subcutaneous, Daily Before Supper  Insulin Regular Human (Conc), 150 Units, Subcutaneous, QAM AC  isosorbide mononitrate, 120 mg, Oral, Q24H  lamoTRIgine, 100 mg, Oral, QAM  lamoTRIgine, 50 mg, Oral, Q PM  levETIRAcetam, 1,000 mg, Oral, BID  lisinopril, 5 mg, Oral, Nightly  metoprolol succinate XL, 200 mg, Oral, Q24H  pantoprazole, 40 mg, Oral, QAM  ranolazine, 1,000 mg, Oral, Q12H  sodium chloride, 10 mL, Intravenous, Q12H  sodium chloride, 10 mL, Intravenous, Q12H  spironolactone, 25 mg, Oral, Daily  vitamin D3, 5,000 Units, Oral, Daily  vitamin E, 400 Units, Oral, Daily  Vortioxetine HBr, 20 mg, Oral, Daily         Current Antimicrobial  Therapy:  Anti-Infectives (From admission, onward)    None        Current Diuretic Therapy:  Diuretics (From admission, onward)    Ordered     Dose/Rate Route Frequency Start Stop    06/07/22 1638  spironolactone (ALDACTONE) tablet 25 mg        Ordering Provider: Otto Valencia MD    25 mg Oral Daily 06/08/22 0900          ----------------------------------------------------------------------------------------------------------------------  Vital Signs:  Temp:  [98.2 °F (36.8 °C)-98.9 °F (37.2 °C)] 98.4 °F (36.9 °C)  Heart Rate:  [68-96] 73  Resp:  [10-24] 14  BP: ()/(51-91) 119/80  SpO2:  [92 %-100 %] 99 %  on  Flow (L/min):  [2] 2;   Device (Oxygen Therapy): nasal cannula  Body mass index is 44.16 kg/m².    Wt Readings from Last 3 Encounters:   06/13/22 (!) 140 kg (307 lb 12.8 oz)   03/19/22 132 kg (290 lb)   02/10/22 (!) 138 kg (304 lb)     Intake & Output (last 3 days)       06/10 0701  06/11 0700 06/11 0701  06/12 0700 06/12 0701  06/13 0700 06/13 0701  06/14 0700    P.O. 1760 1740 2580 855    I.V. (mL/kg) 0 (0) 0 (0)      Total Intake(mL/kg) 1760 (12.5) 1740 (12.4) 2580 (18.4) 855 (6.1)    Urine (mL/kg/hr) 4100 (1.2) 2700 (0.8) 3650 (1.1) 625 (0.4)    Total Output 4100 2700 3650 625    Net -2340 -960 -1070 +230                Diet Regular; Cardiac, Consistent Carbohydrate  NPO Diet NPO Type: Sips with Meds  ----------------------------------------------------------------------------------------------------------------------  Physical exam:  Constitutional:  Well-developed and well-nourished.  No respiratory distress.  Obese.    HENT:  Head:  Normocephalic and atraumatic.  Mouth:  Moist mucous membranes.    Eyes:  Conjunctivae and EOM are normal. No scleral icterus.    Neck:  Neck supple.  No JVD present.    Cardiovascular:  Normal rate, regular rhythm and normal heart sounds with no murmur.  Pulmonary/Chest:  No respiratory distress, no wheezes, no crackles, with normal breath sounds and good air  movement.  Abdominal:  Soft.  Bowel sounds are normal.  No distension and no tenderness.   Musculoskeletal:  No edema, no tenderness, and no deformity.  No red or swollen joints anywhere.    Neurological:  Alert and oriented to person, place, and time.  No cranial nerve deficit.  No tongue deviation.  No facial droop.  No slurred speech.   Skin:  Skin is warm and dry. No rash noted. No pallor.   Peripheral vascular:  Pulses in all 4 extremities with no clubbing, no cyanosis, no edema.  ----------------------------------------------------------------------------------------------------------------------  Tele:    ----------------------------------------------------------------------------------------------------------------------  Results from last 7 days   Lab Units 06/12/22  0050 06/11/22  0038 06/10/22  0021   WBC 10*3/mm3 8.55 6.75 7.05   HEMOGLOBIN g/dL 11.7* 11.6* 11.5*   HEMATOCRIT % 36.5* 37.5 37.4*   MCV fL 80.2 82.8 83.7   MCHC g/dL 32.1 30.9* 30.7*   PLATELETS 10*3/mm3 179 142 156         Results from last 7 days   Lab Units 06/12/22  0050 06/11/22  0326 06/10/22  0021 06/08/22  0743 06/08/22  0203 06/07/22  1118   SODIUM mmol/L 138 141 138  --  137  --    POTASSIUM mmol/L 4.5 4.3 3.8  --  4.2  --    MAGNESIUM mg/dL  --   --   --  2.1  --  1.9   CHLORIDE mmol/L 103 104 104  --  103  --    CO2 mmol/L 27.3 27.7 22.3  --  24.8  --    BUN mg/dL 9 8 10  --  11  --    CREATININE mg/dL 0.73* 0.70* 0.75*  --  0.66*  --    CALCIUM mg/dL 9.1 9.3 8.6  --  9.1  --    GLUCOSE mg/dL 103* 129* 141*  --  242*  --    ALBUMIN g/dL 3.37*  --  3.04*  --  3.42*  --    BILIRUBIN mg/dL 0.6  --  0.4  --  0.5  --    ALK PHOS U/L 89  --  84  --  104  --    AST (SGOT) U/L 26  --  37  --  30  --    ALT (SGPT) U/L 29  --  37  --  37  --    Estimated Creatinine Clearance: 150 mL/min (A) (by C-G formula based on SCr of 0.73 mg/dL (L)).  No results found for: AMMONIA  Results from last 7 days   Lab Units 06/11/22  3599 06/11/22  8382  06/08/22  0203   TROPONIN T ng/mL <0.010 <0.010 <0.010         Results from last 7 days   Lab Units 06/08/22  0203   CHOLESTEROL mg/dL 97   TRIGLYCERIDES mg/dL 100   HDL CHOL mg/dL 29*   LDL CHOL mg/dL 49     Glucose   Date/Time Value Ref Range Status   06/13/2022 1654 160 (H) 70 - 130 mg/dL Final     Comment:     Meter: [id number] : [id number] LILLIE IRENE   06/13/2022 1042 203 (H) 70 - 130 mg/dL Final     Comment:     Meter: [id number] : [id number] LILLIE IRENE   06/13/2022 0611 162 (H) 70 - 130 mg/dL Final     Comment:     Meter: [id number] : [id number] MAGGISHARON FELIPE   06/12/2022 2314 77 70 - 130 mg/dL Final     Comment:     Meter: [id number] : [id number] MAGGISHARON PACHECOELL   06/12/2022 2144 84 70 - 130 mg/dL Final     Comment:     Meter: [id number] : [id number] MAGGISHARON PACHECOELL   06/12/2022 1734 88 70 - 130 mg/dL Final     Comment:     Meter: [id number] : [id number] LILLIE BONILLA   06/12/2022 1654 65 (L) 70 - 130 mg/dL Final     Comment:     Meter: [id number] : [id number] LILLIE IRENE   06/12/2022 1055 178 (H) 70 - 130 mg/dL Final     Comment:     Meter: [id number] : [id number] LILLIE BONILLA     Lab Results   Component Value Date    TSH 0.656 06/08/2022    FREET4 0.99 04/11/2017     No results found for: PREGTESTUR, PREGSERUM, HCG, HCGQUANT  Pain Management Panel     Pain Management Panel Latest Ref Rng & Units 10/15/2019 9/14/2019    AMPHETAMINES SCREEN, URINE Negative Negative Negative    BARBITURATES SCREEN Negative Negative Negative    BENZODIAZEPINE SCREEN, URINE Negative Negative Negative    BUPRENORPHINEUR Negative Negative Negative    COCAINE SCREEN, URINE Negative Negative Negative    METHADONE SCREEN, URINE Negative Negative Negative    METHAMPHETAMINEUR Negative Negative Negative        Brief Urine Lab Results  (Last result in the past 365 days)      Color   Clarity   Blood   Leuk Est   Nitrite   Protein   CREAT   Urine HCG        03/19/22 1833 Yellow   Clear   Negative    Trace   Negative   Negative               No results found for: BLOODCX  No results found for: URINECX  No results found for: WOUNDCX  No results found for: STOOLCX  No results found for: RESPCX  No results found for: AFBCX        I have personally looked at the labs and they are summarized above.  ----------------------------------------------------------------------------------------------------------------------  Detailed radiology reports for the last 24 hours:    Imaging Results (Last 24 Hours)     ** No results found for the last 24 hours. **        Assessment & Plan      #Chest pain  #CAD s/p CABG with recent LHC revealing diffusal distal LAD disease and moderate to high grade distal RCA stenosis    #History Postop Pericarditis status post pericardial window 2017  - Cardiology consulted and planning on taking patient for repeat LHC with IFR of RCA to see if PCI would be beneficial.   - Continue imdur, ranexa, ASA, statin.     #Chronic HFpEF  - Appears compensated. Will diurese PRN.     #HTN  - Given relative hypotension, lisinopril decreased.     #DM II  - Patient on large amounts of concentrated insulin. 150 units of Humulin R BID. Will hold AM dose given NPO status and BG less than 100 this AM.     #Hx of CVA  - No residual symptoms. Continue ASA, statin    #EDD  - Continue QHS CPAP    F: PO  E: Replace as needed   N: CC    Code status: Full     Dispo: Pending clinical improvement     VTE Prophylaxis:   Mechanical Order History:      Ordered        06/10/22 1403  Place Sequential Compression Device  Once,   Status:  Canceled            06/10/22 1403  Maintain Sequential Compression Device  Continuous,   Status:  Canceled                    Pharmalogical Order History:      Ordered     Dose Route Frequency Stop    06/07/22 1353  Enoxaparin Sodium (LOVENOX) syringe 40 mg         40 mg SC Every 24 Hours --              Saturnino Beckman MD  [facility]  06/13/22  17:42 EDT

## 2022-06-13 NOTE — PLAN OF CARE
"Goal Outcome Evaluation:  Plan of Care Reviewed With: patient        Progress: improving  Outcome Evaluation: Pt is AOx4, afebrile on 2L NC, VSS but has had some episodes of HTN (160's/90's). Right femoral LHC site is CDI. Pt's chest pain has stayed consistently at 2/10 and pt describes it as \"discomfort\", no intervention needed. Pt is currently resting in bed and verbalizes no complaints. Call light in reach, bed in low position, will continue to monitor.  "

## 2022-06-13 NOTE — PROGRESS NOTES
LOS: 6 days     Name: Denzel Harding  Age/Sex: 62 y.o. male  :  1960        PCP: Isaura Godoy MD  REF: No Known Provider    Principal Problem:    Chest pain, unspecified type  Active Problems:    Abnormal nuclear stress test      Reason for follow-up: Chest pain    Subjective     Subjective     Denzel Harding 62-year-old male with a past medical history significant for coronary artery disease status post CABG x3 with LAD endarterectomy, postop pericarditis with effusion status post left anterior thoracotomy and pericardial window in 2017, essential hypertension, diabetes mellitus type 2, history of CVA, COPD, obstructive sleep apnea and hyperlipidemia.  Patient states that he is on his way to a doctor's appointment this a.m. when he developed sudden onset chest pain in the middle of his chest.    Interval History: Patient continues to have intermittent chest pain.  Blood pressure stable.  Denies any shortness of breath.    Vital Signs  Temp:  [98.2 °F (36.8 °C)-98.9 °F (37.2 °C)] 98.5 °F (36.9 °C)  Heart Rate:  [71-96] 75  Resp:  [10-24] 15  BP: ()/(51-91) 97/73     Vital Signs (last 72 hrs)       06/10 0700   0659  0700   0659  0700   0659  0700   1326   Most Recent      Temp (°F) 97.7 -  99    97.7 -  98.4    98 -  98.9    98.2 -  98.5     98.5 (36.9)  1200    Heart Rate 74 -  81    69 -  86    68 -  96    75 -  78     75  1203    Resp 12 -  23    8 -  21    10 -  22    14 -  24     15  1203    /44 -  158/74    107/60 -  151/81    90/49 -  174/87    97/73 -  148/86     97/73  1203    SpO2 (%) 91 -  99    91 -  100    92 -  100    97 -  100     98  1203        Documented weights    22 0906 22 1358 22 0400 22 0400   Weight: 132 kg (290 lb) (!) 140 kg (308 lb) (!) 139 kg (306 lb 11.2 oz) (!) 137 kg (301 lb)    06/10/22 0500 22 0503 22 0500 22 0503   Weight: (!) 141 kg (310 lb 6.4 oz) (!)  141 kg (311 lb 3.2 oz) (!) 140 kg (309 lb 9.6 oz) (!) 140 kg (307 lb 12.8 oz)      Body mass index is 44.16 kg/m².    Intake/Output Summary (Last 24 hours) at 6/13/2022 1326  Last data filed at 6/13/2022 1300  Gross per 24 hour   Intake 3155 ml   Output 3275 ml   Net -120 ml     Objective    Objective       Physical Exam:     General Appearance:    Alert, cooperative, in no acute distress   Head:    Normocephalic, without obvious abnormality, atraumatic   Eyes:            Conjunctivae and sclerae normal, no   icterus, no pallor, corneas clear.   Neck:   No adenopathy, supple, trachea midline, no thyromegaly, no   carotid bruit, no JVD   Lungs:     Clear to auscultation,respirations regular, even and                  unlabored    Heart:    Regular rhythm and normal rate, normal S1 and S2, no            murmur, no gallop, no rub, no click   Chest Wall:    No abnormalities observed   Abdomen:     Normal bowel sounds, no masses, no organomegaly, soft        nontender, nondistended, no guarding, no rebound                tenderness   Extremities:   Moves all extremities well, no edema, no cyanosis, no             redness   Pulses:   Pulses palpable and equal bilaterally   Skin:   No bleeding, bruising or rash       Neurologic:  Alert and oriented     Results review       Results Review:   Results from last 7 days   Lab Units 06/12/22  0050 06/11/22  0038 06/10/22  0021 06/08/22  0203 06/07/22  0919   WBC 10*3/mm3 8.55 6.75 7.05 10.08 8.90   HEMOGLOBIN g/dL 11.7* 11.6* 11.5* 12.4* 13.1   PLATELETS 10*3/mm3 179 142 156 175 183     Results from last 7 days   Lab Units 06/12/22  0050 06/11/22  0326 06/10/22  0021 06/08/22  0203 06/07/22  0919   SODIUM mmol/L 138 141 138 137 139   POTASSIUM mmol/L 4.5 4.3 3.8 4.2 3.6   CHLORIDE mmol/L 103 104 104 103 100   CO2 mmol/L 27.3 27.7 22.3 24.8 27.7   BUN mg/dL 9 8 10 11 8   CREATININE mg/dL 0.73* 0.70* 0.75* 0.66* 0.79   CALCIUM mg/dL 9.1 9.3 8.6 9.1 9.4   GLUCOSE mg/dL 103* 129*  141* 242* 118*   ALT (SGPT) U/L 29  --  37 37 40   AST (SGOT) U/L 26  --  37 30 45*     Results from last 7 days   Lab Units 06/11/22  1649 06/11/22  0429 06/08/22  0203 06/07/22 2019 06/07/22  1420   TROPONIN T ng/mL <0.010 <0.010 <0.010 <0.010 <0.010     Lab Results   Component Value Date    INR 1.05 02/27/2021    INR 0.98 06/17/2020    INR 1.01 10/15/2019    INR 0.97 01/28/2018    INR 1.1 01/28/2018    INR 1.1 12/17/2017    INR 0.97 08/16/2017     Lab Results   Component Value Date    MG 2.1 06/08/2022    MG 1.9 06/07/2022    MG 1.8 03/19/2022     Lab Results   Component Value Date    TSH 0.656 06/08/2022    CHLPL 123 07/01/2021    TRIG 100 06/08/2022    HDL 29 (L) 06/08/2022    LDL 49 06/08/2022      Imaging Results (Last 48 Hours)     ** No results found for the last 48 hours. **        Lab Results   Component Value Date    BNP 10.0 06/09/2018     Echo   Results for orders placed during the hospital encounter of 06/07/22    Adult Transthoracic Echo Complete W/ Cont if Necessary Per Protocol    Interpretation Summary  · Left ventricular ejection fraction appears to be 61 - 65%. Left ventricular systolic function is normal.  · Left ventricular diastolic function is consistent with (grade II w/high LAP) pseudonormalization.  · This is a technically limited study.     I reviewed the patient's new clinical results.    Telemetry: NSR 70-80 bpm      Medication Review:   vitamin C, 500 mg, Oral, Daily  aspirin, 81 mg, Oral, Daily  atorvastatin, 80 mg, Oral, Daily  budesonide-formoterol, 2 puff, Inhalation, BID - RT  cetirizine, 10 mg, Oral, Nightly  clopidogrel, 75 mg, Oral, Daily  enoxaparin, 40 mg, Subcutaneous, Q24H  gabapentin, 600 mg, Oral, Q8H  Insulin Regular Human (Conc), 150 Units, Subcutaneous, Daily Before Lunch  Insulin Regular Human (Conc), 150 Units, Subcutaneous, Daily Before Supper  Insulin Regular Human (Conc), 150 Units, Subcutaneous, QAM AC  isosorbide mononitrate, 120 mg, Oral, Q24H  lamoTRIgine,  100 mg, Oral, QAM  lamoTRIgine, 50 mg, Oral, Q PM  levETIRAcetam, 1,000 mg, Oral, BID  lisinopril, 5 mg, Oral, Nightly  metoprolol succinate XL, 200 mg, Oral, Q24H  pantoprazole, 40 mg, Oral, QAM  ranolazine, 1,000 mg, Oral, Q12H  sodium chloride, 10 mL, Intravenous, Q12H  sodium chloride, 10 mL, Intravenous, Q12H  spironolactone, 25 mg, Oral, Daily  vitamin D3, 5,000 Units, Oral, Daily  vitamin E, 400 Units, Oral, Daily  Vortioxetine HBr, 20 mg, Oral, Daily             Assessment      Assessment:  Assessment       Chest pain with negative troponins, was cardiac consideration with diffuse disease of the distal LAD  ASCVD s/p CABG times 3/2017  Postoperative bradycardia.  Status status post pericardial window  Essential hypertension  Diabetes mellitus type 2  History of CVA  Obstructive sleep apnea on BiPAP  Hyperlipidemia  Morbid obesity            Plan     Recommendations:  Discussed with Dr. Baird we will try to book tomorrow for procedure to intervene with the right coronary artery LAD is not a candidate for intervention  Blood pressures controlled continue current medication    I discussed the patient's findings and my recommendations with patient and family      Electronically signed by ANTONIO Coleman, 06/13/22, 1:28 PM EDT.  Electronically signed by Gurinder Walden MD, 06/13/22, 1:54 PM EDT.    Please note that portions of this note were completed with a voice recognition program.

## 2022-06-13 NOTE — PLAN OF CARE
Goal Outcome Evaluation:  Plan of Care Reviewed With: patient   Pt remains alert and oriented. VSS at this time. Pt will be NPO at midnight for a heart cath. No complaints of chest pain this shift. Call light is within reach. Will continue to monitor.      Progress: no change

## 2022-06-13 NOTE — CONSULTS
Consults    Patient Identification:    Name:  Denzel Harding  Age:  62 y.o.  Sex:  male  :  1960  MRN:  [id number]  Visit Number:  [id number]  Primary care provider:  Isaura Godoy MD    Chief complaint:   Chest pain    History of presenting illness:   Patient is a 62-year-old gentleman with history of CAD s/p CABG by Dr. Damon in 2017, he had a LIMA to distal LAD and vein graft to the left circumflex/OM branch, patient had a heart cath done yesterday for anginal symptoms and abnormal stress test which showed anterior ischemia, patient's cath demonstrated patent LIMA but diffuse disease in the distal LAD which was present during his initial angiogram in 2017, the distal anastomosis site was unfortunately at the diseased segment of the LAD which is less than 2 mm caliber in diameter and has like diffuse high-grade stenosis, patient RCA distal had a 70 to 80% stenosis which was new compared to his previous angiogram in 2017 where he did not have any significant stenosis in the RCA.  Patient continued to have chest pain post coronary angiogram last week and primary cardiologist Dr. Walden consulted me to have a look at his coronary angiogram to see for possible PCI targets.  I did talk to the patient today and explained that his LAD is probably not a good target for intervention in the distal aspect, the RCA stenosis can be target for PCI given this is a new change from his previous angiogram and CABG.    Review of Systems   ---------------------------------------------------------------------------------------------------------------------   Past History:   Family History   Problem Relation Age of Onset   • Hypertension Mother    • Arthritis Mother    • Stomach cancer Mother    • Alcohol abuse Father    • Heart disease Other    • Hypertension Other    • Other Other         Neurologic disorder   • Heart attack Other    • Parkinsonism Other    • Stroke Other    • Heart disease Other    • Hypertension Other     • Heart disease Other    • Stroke Other    • Hypertension Other    • Colon cancer Neg Hx      Past Medical History:   Diagnosis Date   • Anxiety    • Arthritis    • Asthma    • Brachial neuritis 01/19/2017   • Cellulitis     left arm and right leg    • Chest pain last 11/25/21    pt denies any since date of 11/25/21   • CHF (congestive heart failure) (formerly [facility])     Patient reported history May 2020    • COPD (chronic obstructive pulmonary disease) (formerly [facility])    • Coronary artery disease     Patient reported CABG , 3 vessel   • COVID-19 vaccine series completed     Pfizer, plus booster   • Depression    • Diabetes mellitus (HCC)     diagnosed in 1998, checks fsbg 3-4x/day   • Dizziness    • Edema    • GERD (gastroesophageal reflux disease)    • H/O chest x-ray 07/04/2015    Mild Left base atelectasis   • H/O echocardiogram 07/05/2015    Normal LVSF. EF of 60-65%. Grade 1 diastolic dysfunction of the LV myocardium. No evidence of pericardial effusion   • H/O exercise stress test    • Hearing loss     No use of hearing aids   • History of fracture     Reported 2 bones in left foot and a finger   • History of herniated intervertebral disc     History of left L5-S1 disc herniation   • History of nuclear stress test 2021    x2   • History of pneumonia    • History of pneumonia    • Hyperlipidemia    • Hypertension    • Impaired functional mobility, balance, gait, and endurance    • LOM (loss of memory) 1/19/2017   • Lower back pain     Right   • Measles    • MUSE (nonalcoholic steatohepatitis)    • Neuropathy     feet    • EDD treated with BiPAP     BiPAP HS - instructed to bring mask/machine DOS (setting 13 and 5)   • Oxygen dependent     2L NC   • Palpitations    • Pericardial effusion    • Poor dentition     Patient reported missing multiple teeth   • Renal disorder    • Seasonal allergies    • Seizure disorder (formerly [facility])     focal seizures   • SOB (shortness of breath)     no report of any new onset or worsening of symptoms    • Staph infection     back after sugery at the incision site, on Rside face    • Stroke (HCC)     x5 most recent 03/2021, slight weakness in hands occasionaly    • Wears glasses     reading glasses     Past Surgical History:   Procedure Laterality Date   • BACK SURGERY     • CARDIAC CATHETERIZATION     • CARDIAC CATHETERIZATION N/A 8/11/2017    Procedure: Coronary angiography;  Surgeon: Dallas Hernandez MD;  Location: [facility] CATH INVASIVE LOCATION;  Service:    • CARDIAC CATHETERIZATION N/A 8/11/2017    Procedure: Left Heart Cath;  Surgeon: Dallas Hernandez MD;  Location: [facility] CATH INVASIVE LOCATION;  Service:    • CARDIAC CATHETERIZATION N/A 8/11/2017    Procedure: Left ventriculography;  Surgeon: Dallas Hernandez MD;  Location: [facility] CATH INVASIVE LOCATION;  Service:    • CARDIAC CATHETERIZATION      2002 x1 stent,  x1 stent   • CARDIAC CATHETERIZATION N/A 6/9/2022    Procedure: Left Heart Cath;  Surgeon: Otto Valencia MD;  Location: [facility] CATH INVASIVE LOCATION;  Service: Cardiology;  Laterality: N/A;   • CARDIOVASCULAR STRESS TEST  07/03/2017    WITH DR HERNANDEZ AT [facility]   • CARPAL TUNNEL RELEASE Right    • CATARACT EXTRACTION W/ INTRAOCULAR LENS IMPLANT Right 6/12/2017    Procedure: CATARACT PHACO EXTRACTION WITH INTRAOCULAR LENS IMPLANT RIGHT ;  Surgeon: Natalie Cao MD;  Location: [facility] OR;  Service:    • CATARACT EXTRACTION W/ INTRAOCULAR LENS IMPLANT Left 7/10/2017    Procedure: CATARACT PHACO EXTRACTION WITH INTRAOCULAR LENS IMPLANT LEFT;  Surgeon: Natalie Cao MD;  Location: [facility] OR;  Service:    • CHOLECYSTECTOMY     • COLONOSCOPY     • COLONOSCOPY N/A 7/2/2020    Procedure: COLONOSCOPY;  Surgeon: Petra Crowell MD;  Location: [facility] ENDOSCOPY;  Service: Gastroenterology;  Laterality: N/A;  poor prep   • CORONARY ANGIOPLASTY WITH STENT PLACEMENT      X1-LAD 2002, Coronary 2019 (x2 total)   • CORONARY ARTERY BYPASS GRAFT N/A 8/15/2017    Procedure: CORONARY ARTERY BYPASS  GRAFTING x 3 UTILIZING THE LEFT INTERNAL MAMMARY ARTERY WITH ENDOSCOPIC VEIN HARVESTING OF THE RIGHT GREATER SAPHENOUS VEIN, HAMLET, LAD ENDARECTOMY;  Surgeon: London Damon MD;  Location: [facility] OR;  Service:    • ENDOSCOPY     • EYE CAPSULOTOMY WITH LASER Right 11/25/2019    Procedure: EYE CAPSULOTOMY WITH LASER RIGHT;  Surgeon: Natalie Cao MD;  Location: [facility] OR;  Service: Ophthalmology   • EYE CAPSULOTOMY WITH LASER Left 12/9/2019    Procedure: EYE CAPSULOTOMY WITH LASER LEFT;  Surgeon: Natalie Cao MD;  Location: [facility] OR;  Service: Ophthalmology   • EYE SURGERY      cataract surgery both eyes   • INTERVENTIONAL RADIOLOGY PROCEDURE Bilateral 12/31/2019    Procedure: Carotid Cerebral Angiogram;  Surgeon: Damian Dubois MD;  Location: [facility] CATH INVASIVE LOCATION;  Service: Interventional Radiology   • KNEE ARTHROSCOPY Bilateral    • KNEE ARTHROSCOPY Right 2010    Dr Lewis   • KNEE ARTHROSCOPY Left 2008    Dr Jameson   • KNEE MENISCECTOMY Right 10/15/2020    Procedure: Right knee arthroscopy with partial medial and lateral meniscectomy and three compartment chondroplasty.;  Surgeon: South Lewis MD;  Location: [facility] OR;  Service: Orthopedics;  Laterality: Right;   • MOUTH SURGERY      complete extraction    • NEUROPLASTY / TRANSPOSITION ULNAR NERVE AT ELBOW Left    • OTHER SURGICAL HISTORY      Foraminotomy and discectomy   • PERICARDIAL WINDOW N/A 8/25/2017    Procedure: PERICARDIAL WINDOW;  Surgeon: Freeman Phillips MD;  Location: [facility] OR;  Service:    • VENTRAL/INCISIONAL HERNIA REPAIR N/A 1/12/2022    Procedure: INCISIONAL HERNIA REPAIR  WITH MESH;  Surgeon: Torres Kim MD;  Location: [facility] OR;  Service: General;  Laterality: N/A;     Social History     Socioeconomic History   • Marital status:    • Number of children: 1   Tobacco Use   • Smoking status: Former Smoker     Packs/day: 1.00     Years: 10.00     Pack years: 10.00     Types: Cigarettes     Quit date: 1/1/1998      Years since quittin.4   • Smokeless tobacco: Former User     Types: Snuff     Quit date:    Vaping Use   • Vaping Use: Never used   Substance and Sexual Activity   • Alcohol use: Yes     Comment: 3 drinks a year   • Drug use: No   • Sexual activity: Defer     ---------------------------------------------------------------------------------------------------------------------   Allergies:  Ajovy [fremanezumab-vfrm], Sulfa antibiotics, Invokana [canagliflozin], Codeine, and Morphine  ---------------------------------------------------------------------------------------------------------------------   Prior to Admission Medications     Prescriptions Last Dose Informant Patient Reported? Taking?    aspirin 81 MG EC tablet 2022 Pharmacy Yes Yes    Take 81 mg by mouth Daily.    atorvastatin (LIPITOR) 80 MG tablet 2022 Pharmacy No Yes    Take 1 tablet by mouth Every Night.    budesonide-formoterol (Symbicort) 80-4.5 MCG/ACT inhaler 2022 Pharmacy No Yes    Inhale 2 puffs 2 (Two) Times a Day. Rinse mouth with water after use.    clopidogrel (PLAVIX) 75 MG tablet 2022 Pharmacy No Yes    Take 1 tablet by mouth Daily.    coenzyme Q10 100 MG capsule 2022 Pharmacy Yes Yes    Take 100 mg by mouth Daily.    furosemide (LASIX) 40 MG tablet 2022 Pharmacy No Yes    Take 1 tablet by mouth Daily As Needed (edema).    gabapentin (NEURONTIN) 600 MG tablet 2022 Pharmacy No Yes    Take 1 tablet by mouth 3 (Three) Times a Day.    HYDROmorphone (DILAUDID) 2 MG tablet Past Month Pharmacy Yes Yes    Take 2 mg by mouth Daily As Needed (Migraine).    HYDROmorphone (DILAUDID) 4 MG tablet Past Month Pharmacy Yes Yes    Take 4 mg by mouth Daily As Needed (migraine).    Insulin Regular Human, Conc, (HumuLIN R U-500 KwikPen) 500 UNIT/ML solution pen-injector CONCENTRATED injection 2022 Pharmacy Yes Yes    Inject 190 Units under the skin into the appropriate area as directed Every Morning. Take 190 units in am,  180 units in afternoon and 180 units in the evening.    Insulin Regular Human, Conc, (HumuLIN R) 500 UNIT/ML solution pen-injector CONCENTRATED injection 6/6/2022 Pharmacy Yes Yes    Inject 180 Units under the skin into the appropriate area as directed Daily With Lunch. Take 190 units in am, 180 units in afternoon and 180 units in the evening.    Insulin Regular Human, Conc, (HumuLIN R) 500 UNIT/ML solution pen-injector CONCENTRATED injection 6/6/2022 Pharmacy Yes Yes    Inject 180 Units under the skin into the appropriate area as directed Daily With Dinner. Take 190 units in am, 180 units in afternoon and 180 units in the evening.    isosorbide mononitrate (IMDUR) 60 MG 24 hr tablet 6/7/2022 Pharmacy Yes Yes    Take 60 mg by mouth Daily.    lamoTRIgine (LaMICtal) 100 MG tablet 6/7/2022 Pharmacy Yes Yes    Take 100 mg by mouth Every Morning.    lamoTRIgine (LaMICtal) 100 MG tablet 6/6/2022 Pharmacy Yes Yes    Take 50 mg by mouth Every Evening.    levETIRAcetam (KEPPRA) 1000 MG tablet 6/7/2022 Pharmacy Yes Yes    Take 1,000 mg by mouth 2 (Two) Times a Day.    levocetirizine (XYZAL) 5 MG tablet 6/6/2022 Pharmacy No Yes    Take 1 tablet by mouth Every Evening.    lisinopril (PRINIVIL,ZESTRIL) 5 MG tablet 6/6/2022 Pharmacy Yes Yes    Take 5 mg by mouth every night at bedtime.    metoprolol succinate XL (TOPROL-XL) 200 MG 24 hr tablet 6/7/2022 Pharmacy No Yes    Take 1 tablet by mouth Daily    omeprazole (priLOSEC) 20 MG capsule 6/7/2022 Pharmacy No Yes    Take 1 capsule by mouth Daily.    potassium chloride (K-DUR,KLOR-CON) 20 MEQ CR tablet 6/7/2022 Pharmacy No Yes    Take 1 tablet by mouth Daily As Needed (take with lasix).    spironolactone (ALDACTONE) 25 MG tablet 6/7/2022 Pharmacy No Yes    Take 1 tablet by mouth Daily.    Tiotropium Bromide Monohydrate (Spiriva Respimat) 1.25 MCG/ACT aerosol solution inhaler 6/7/2022 Pharmacy No Yes    Inhale 2 puffs Daily.    tiZANidine (ZANAFLEX) 4 MG tablet 6/6/2022 Pharmacy Yes  Yes    Take 8 mg by mouth At Night As Needed for Muscle Spasms.    vitamin C (ASCORBIC ACID) 500 MG tablet 6/7/2022 Self Yes Yes    Take 500 mg by mouth Daily.    vitamin D3 125 MCG (5000 UT) capsule capsule 6/7/2022 Self Yes Yes    Take 5,000 Units by mouth Daily.    vitamin E 400 UNIT capsule 6/7/2022 Self No Yes    Take 1 capsule by mouth 2 (two) times a day.    Vortioxetine HBr (Trintellix) 20 MG tablet 6/7/2022 Pharmacy Yes Yes    Take 20 mg by mouth Daily.    albuterol sulfate HFA (Ventolin HFA) 108 (90 Base) MCG/ACT inhaler Unknown Pharmacy No No    Inhale 2 puffs Every 4 (Four) Hours As Needed for Wheezing or Shortness of Air.    Dupilumab (Dupixent) 300 MG/2ML solution pen-injector 6/5/2022 Pharmacy Yes No    Inject 300 mg under the skin into the appropriate area as directed Every 14 (Fourteen) Days.    Semaglutide, 1 MG/DOSE, (Ozempic, 1 MG/DOSE,) 2 MG/1.5ML solution pen-injector 6/4/2022 Pharmacy No No    Inject 1 mg under the skin into the appropriate area as directed 1 (One) Time Per Week.    SUMAtriptan (IMITREX) 50 MG tablet Unknown Pharmacy Yes No    Take 50 mg by mouth Every 2 (Two) Hours As Needed for Migraine. Take one tablet at onset of headache. May repeat dose one time in 2 hours if headache not relieved.        Hospital Meds:  vitamin C, 500 mg, Oral, Daily  aspirin, 81 mg, Oral, Daily  atorvastatin, 80 mg, Oral, Daily  budesonide-formoterol, 2 puff, Inhalation, BID - RT  cetirizine, 10 mg, Oral, Nightly  clopidogrel, 75 mg, Oral, Daily  enoxaparin, 40 mg, Subcutaneous, Q24H  gabapentin, 600 mg, Oral, Q8H  Insulin Regular Human (Conc), 150 Units, Subcutaneous, Daily Before Lunch  Insulin Regular Human (Conc), 150 Units, Subcutaneous, Daily Before Supper  Insulin Regular Human (Conc), 150 Units, Subcutaneous, QAM AC  isosorbide mononitrate, 120 mg, Oral, Q24H  lamoTRIgine, 100 mg, Oral, QAM  lamoTRIgine, 50 mg, Oral, Q PM  levETIRAcetam, 1,000 mg, Oral, BID  lisinopril, 5 mg, Oral,  Nightly  metoprolol succinate XL, 200 mg, Oral, Q24H  pantoprazole, 40 mg, Oral, QAM  ranolazine, 1,000 mg, Oral, Q12H  sodium chloride, 10 mL, Intravenous, Q12H  sodium chloride, 10 mL, Intravenous, Q12H  spironolactone, 25 mg, Oral, Daily  vitamin D3, 5,000 Units, Oral, Daily  vitamin E, 400 Units, Oral, Daily  Vortioxetine HBr, 20 mg, Oral, Daily         ---------------------------------------------------------------------------------------------------------------------   Vital Signs:  Temp:  [98.2 °F (36.8 °C)-98.9 °F (37.2 °C)] 98.4 °F (36.9 °C)  Heart Rate:  [70-96] 70  Resp:  [10-24] 19  BP: ()/(51-91) 123/63      06/11/22  0503 06/12/22  0500 06/13/22  0503   Weight: (!) 141 kg (311 lb 3.2 oz) (!) 140 kg (309 lb 9.6 oz) (!) 140 kg (307 lb 12.8 oz)     Body mass index is 44.16 kg/m².  ---------------------------------------------------------------------------------------------------------------------   Physical exam:   Constitutional:  Well-developed and well-nourished.  No respiratory distress.      HENT:  Head: Normocephalic and atraumatic.  Mouth:  Moist mucous membranes.    Eyes:  Conjunctivae and EOM are normal.  Pupils are equal, round, and reactive to light.  No scleral icterus.  Neck:  Neck supple.  No JVD present.    Cardiovascular:  Normal rate, regular rhythm and normal heart sounds with no murmur.  Pulmonary/Chest:  No respiratory distress, no wheezes, no crackles, with normal breath sounds and good air movement.  Abdominal:  Soft.  Bowel sounds are normal.  No distension and no tenderness.   Musculoskeletal:  No edema, no tenderness, and no deformity.  No red or swollen joints anywhere.    Neurological:  Alert and oriented to person, place, and time.  No cranial nerve deficit.  No tongue deviation.  No facial droop.  No slurred speech.   Skin:  Skin is warm and dry.  No rash noted.  No pallor.   Psychiatric:  Normal mood and affect.  Behavior is normal.  Judgment and thought content normal.    Peripheral vascular:  No edema and strong pulses on all 4 extremities.    ---------------------------------------------------------------------------------------------------------------------   EKG: Sinus rhythm, nonspecific ST-T changes  Telemetry: Sinus  I have personally looked at both the EKG and the telemetry strips.  Echo:  Results for orders placed during the hospital encounter of 06/07/22    Adult Transthoracic Echo Complete W/ Cont if Necessary Per Protocol    Interpretation Summary  · Left ventricular ejection fraction appears to be 61 - 65%. Left ventricular systolic function is normal.  · Left ventricular diastolic function is consistent with (grade II w/high LAP) pseudonormalization.  · This is a technically limited study.    ---------------------------------------------------------------------------------------------------------------------   Results from last 7 days   Lab Units 06/12/22  0050 06/11/22  0038 06/10/22  0021   WBC 10*3/mm3 8.55 6.75 7.05   HEMOGLOBIN g/dL 11.7* 11.6* 11.5*   HEMATOCRIT % 36.5* 37.5 37.4*   MCV fL 80.2 82.8 83.7   MCHC g/dL 32.1 30.9* 30.7*   PLATELETS 10*3/mm3 179 142 156         Results from last 7 days   Lab Units 06/12/22  0050 06/11/22  0326 06/10/22  0021 06/08/22  0743 06/08/22  0203 06/07/22  1118   SODIUM mmol/L 138 141 138  --  137  --    POTASSIUM mmol/L 4.5 4.3 3.8  --  4.2  --    MAGNESIUM mg/dL  --   --   --  2.1  --  1.9   CHLORIDE mmol/L 103 104 104  --  103  --    CO2 mmol/L 27.3 27.7 22.3  --  24.8  --    BUN mg/dL 9 8 10  --  11  --    CREATININE mg/dL 0.73* 0.70* 0.75*  --  0.66*  --    CALCIUM mg/dL 9.1 9.3 8.6  --  9.1  --    GLUCOSE mg/dL 103* 129* 141*  --  242*  --    ALBUMIN g/dL 3.37*  --  3.04*  --  3.42*  --    BILIRUBIN mg/dL 0.6  --  0.4  --  0.5  --    ALK PHOS U/L 89  --  84  --  104  --    AST (SGOT) U/L 26  --  37  --  30  --    ALT (SGPT) U/L 29  --  37  --  37  --    Estimated Creatinine Clearance: 150 mL/min (A) (by C-G formula  based on SCr of 0.73 mg/dL (L)).  No results found for: AMMONIA  Results from last 7 days   Lab Units 06/11/22  1649 06/11/22  0429 06/08/22  0203   TROPONIN T ng/mL <0.010 <0.010 <0.010         Lab Results   Component Value Date    HGBA1C 6.80 (H) 06/08/2022     Lab Results   Component Value Date    TSH 0.656 06/08/2022    FREET4 0.99 04/11/2017     No results found for: PREGTESTUR, PREGSERUM, HCG, HCGQUANT  Pain Management Panel     Pain Management Panel Latest Ref Rng & Units 10/15/2019 9/14/2019    AMPHETAMINES SCREEN, URINE Negative Negative Negative    BARBITURATES SCREEN Negative Negative Negative    BENZODIAZEPINE SCREEN, URINE Negative Negative Negative    BUPRENORPHINEUR Negative Negative Negative    COCAINE SCREEN, URINE Negative Negative Negative    METHADONE SCREEN, URINE Negative Negative Negative    METHAMPHETAMINEUR Negative Negative Negative        No results found for: BLOODCX  No results found for: URINECX  No results found for: WOUNDCX  No results found for: STOOLCX  Results from last 7 days   Lab Units 06/08/22  0203   CHOLESTEROL mg/dL 97   TRIGLYCERIDES mg/dL 100   HDL CHOL mg/dL 29*   LDL CHOL mg/dL 49       ---------------------------------------------------------------------------------------------------------------------   Imaging Results (Last 7 Days)     Procedure Component Value Units Date/Time    XR Chest 1 View [[id number]] Collected: 06/07/22 0933     Updated: 06/07/22 0937    Narrative:      XR CHEST 1 VW-     CLINICAL INDICATION: Chest pain protocol        COMPARISON: None available      TECHNIQUE: Single frontal view of the chest.     FINDINGS:      LUNGS: Lungs are adequately aerated.      HEART AND MEDIASTINUM: Heart and mediastinal contours are unremarkable        SKELETON: Bony and soft tissue structures are unremarkable.             Impression:      No radiographic evidence of acute cardiac or pulmonary disease.     This report was finalized on 6/7/2022 9:34 AM by Dr. Gardiner  MD Edmund.           ----------------------------------------------------------------------------------------------------------------------  Assessment:   Chest pain, concerning for unstable angina  CAD s/p CABG s/p recent angiogram demonstrating diffuse distal LAD disease and moderate to high-grade distal RCA stenosis which is new compared to his previous angiogram  Hypertension  Diabetes mellitus type 2  History of CVA  Obstructive sleep apnea      Plan:   After long discussion with the patient I will consider doing RCA PCI, I will consider doing IFR prior to the PCI to see if the patient is functionally flow-limiting.  Continue with current medications.  Thank you for the consult.          Joel Turpin MD, [facility]  Interventional Cardiology      06/13/22  17:10 EDT

## 2022-06-14 PROBLEM — I20.0 UNSTABLE ANGINA (HCC): Status: ACTIVE | Noted: 2022-01-01

## 2022-06-14 NOTE — PLAN OF CARE
Problem: Adult Inpatient Plan of Care  Goal: Plan of Care Review  Outcome: Ongoing, Progressing  Flowsheets (Taken 6/14/2022 0201)  Progress: no change  Goal: Patient-Specific Goal (Individualized)  Outcome: Ongoing, Progressing  Goal: Absence of Hospital-Acquired Illness or Injury  Outcome: Ongoing, Progressing  Intervention: Identify and Manage Fall Risk  Description: Perform standard risk assessment on admission using a validated tool or comprehensive approach appropriate to the patient; reassess fall risk frequently, with change in status or transfer to another level of care.  Communicate fall injury risk to interprofessional healthcare team.  Determine need for increased observation, equipment and environmental modification, such as low bed, signage and supportive, nonskid footwear.  Adjust safety measures to individual developmental age, stage and identified risk factors.  Reinforce the importance of safety and physical activity with patient and family.  Perform regular intentional rounding to assess need for position change, pain assessment and personal needs, including assistance with toileting.  Recent Flowsheet Documentation  Taken 6/14/2022 0100 by Fern Mathew RN  Safety Promotion/Fall Prevention: safety round/check completed  Taken 6/13/2022 2300 by Fern Mathew RN  Safety Promotion/Fall Prevention: safety round/check completed  Taken 6/13/2022 2100 by Fern Mathew RN  Safety Promotion/Fall Prevention: safety round/check completed  Taken 6/13/2022 1900 by Fern Mathew RN  Safety Promotion/Fall Prevention:   safety round/check completed   room organization consistent   nonskid shoes/slippers when out of bed   lighting adjusted   fall prevention program maintained   clutter free environment maintained   activity supervised  Intervention: Prevent Skin Injury  Description: Perform a screening for skin injury risk, such as pressure or moisture associated skin damage on admission and at  regular intervals throughout hospital stay.  Keep all areas of skin (especially folds) clean and dry.  Maintain adequate skin hydration.  Relieve and redistribute pressure and protect bony prominences; implement measures based on patient-specific risk factors.  Match turning and repositioning schedule to clinical condition.  Encourage weight shift frequently; assist with reposition if unable to complete independently.  Float heels off bed; avoid pressure on the Achilles tendon.  Keep skin free from extended contact with medical devices.  Encourage functional activity and mobility, as early as tolerated.  Use aids (e.g., slide boards, mechanical lift) during transfer.  Recent Flowsheet Documentation  Taken 6/13/2022 2002 by Fern Mathew, RN  Body Position: position changed independently  Intervention: Prevent Infection  Description: Maintain skin and mucous membrane integrity; promote hand, oral and pulmonary hygiene.  Optimize fluid balance, nutrition, sleep and glycemic control to maximize infection resistance.  Identify potential sources of infection early to prevent or mitigate progression of infection (e.g., wound, lines, devices).  Evaluate ongoing need for invasive devices; remove promptly when no longer indicated.  Recent Flowsheet Documentation  Taken 6/13/2022 1900 by Fern Mathew, RN  Infection Prevention:   single patient room provided   rest/sleep promoted  Goal: Optimal Comfort and Wellbeing  Outcome: Ongoing, Progressing  Goal: Readiness for Transition of Care  Outcome: Ongoing, Progressing     Problem: Fall Injury Risk  Goal: Absence of Fall and Fall-Related Injury  Outcome: Ongoing, Progressing  Intervention: Promote Injury-Free Environment  Description: Provide a safe, barrier-free environment that encourages independent activity.  Keep care area uncluttered and well-lighted.  Determine need for increased observation or monitoring.  Avoid use of devices that minimize mobility, such as  restraints or indwelling urinary catheter.  Recent Flowsheet Documentation  Taken 6/14/2022 0100 by Fern Mathew, RN  Safety Promotion/Fall Prevention: safety round/check completed  Taken 6/13/2022 2300 by Fern Mathew RN  Safety Promotion/Fall Prevention: safety round/check completed  Taken 6/13/2022 2100 by Fern Mathew, RN  Safety Promotion/Fall Prevention: safety round/check completed  Taken 6/13/2022 1900 by Fern Mathew RN  Safety Promotion/Fall Prevention:   safety round/check completed   room organization consistent   nonskid shoes/slippers when out of bed   lighting adjusted   fall prevention program maintained   clutter free environment maintained   activity supervised     Problem: Skin Injury Risk Increased  Goal: Skin Health and Integrity  Outcome: Ongoing, Progressing   Goal Outcome Evaluation:           Progress: no change

## 2022-06-14 NOTE — PLAN OF CARE
Problem: Adult Inpatient Plan of Care  Goal: Plan of Care Review  Outcome: Ongoing, Progressing  Flowsheets  Taken 6/14/2022 0201 by Fern Mathew RN  Progress: no change  Taken 6/13/2022 0413 by Crissy Garcia RN  Plan of Care Reviewed With: patient  Goal: Patient-Specific Goal (Individualized)  Outcome: Ongoing, Progressing  Goal: Absence of Hospital-Acquired Illness or Injury  Outcome: Ongoing, Progressing  Intervention: Prevent Skin Injury  Recent Flowsheet Documentation  Taken 6/14/2022 0800 by Zeinab Harding RN  Skin Protection: adhesive use limited  Goal: Optimal Comfort and Wellbeing  Outcome: Ongoing, Progressing  Intervention: Monitor Pain and Promote Comfort  Recent Flowsheet Documentation  Taken 6/14/2022 0800 by Zeinab Harding RN  Pain Management Interventions:   see MAR   relaxation techniques promoted  Intervention: Provide Person-Centered Care  Recent Flowsheet Documentation  Taken 6/14/2022 0800 by Zeinab Harding RN  Trust Relationship/Rapport:   care explained   choices provided  Goal: Readiness for Transition of Care  Outcome: Ongoing, Progressing   Problem: Fall Injury Risk  Goal: Absence of Fall and Fall-Related Injury  Outcome: Ongoing, Progressing  Intervention: Identify and Manage Contributors  Recent Flowsheet Documentation  Taken 6/14/2022 0800 by Zeinab Harding RN  Medication Review/Management: medications reviewed  Self-Care Promotion: independence encouraged   Problem: Skin Injury Risk Increased  Goal: Skin Health and Integrity  Outcome: Ongoing, Progressing  Intervention: Promote and Optimize Oral Intake  Recent Flowsheet Documentation  Taken 6/14/2022 0800 by Zeinab Harding RN  Oral Nutrition Promotion: rest periods promoted  Intervention: Optimize Skin Protection  Recent Flowsheet Documentation  Taken 6/14/2022 0800 by Zeinab Harding RN  Pressure Reduction Techniques: frequent weight shift encouraged  Pressure Reduction Devices: pressure-redistributing mattress utilized  Skin  Protection: adhesive use limited   Goal Outcome Evaluation:

## 2022-06-14 NOTE — PROGRESS NOTES
[facility] HOSPITALIST PROGRESS NOTE     Patient Identification:  Name:  Denzel Harding  Age:  62 y.o.  Sex:  male  :  1960  MRN:  [id number]  Visit Number:  [id number]  ROOM: [place]/CATH     Primary Care Provider:  Isaura Godoy MD    Length of stay in inpatient status:  7    Subjective     Chief Compliant:    Chief Complaint   Patient presents with   • Chest Pain   • Shortness of Breath       History of Presenting Illness:    Patient evaluated after he returned to cath lab post PCI. Patient noted with severe lower back pain radiating to his hips. He notes chronic pain but this is much worse. Started after having to lay flat for procedure. He noted he was allergic to morphine and that morphine made his pain worse. He noted the only thing that has helped before is dilaudid. He reports the pain is so bad he cannot tell if he is having any chest pain with it.     ROS:  Otherwise 10 point ROS negative other than documented above in HPI.     Objective     Current Hospital Meds:vitamin C, 500 mg, Oral, Daily  aspirin, 81 mg, Oral, Daily  atorvastatin, 80 mg, Oral, Daily  budesonide-formoterol, 2 puff, Inhalation, BID - RT  cetirizine, 10 mg, Oral, Nightly  clopidogrel, 75 mg, Oral, Daily  enoxaparin, 40 mg, Subcutaneous, Q24H  gabapentin, 600 mg, Oral, Q8H  HYDROmorphone, 0.5 mg, Intravenous, Once  Insulin Regular Human (Conc), 150 Units, Subcutaneous, Daily Before Lunch  Insulin Regular Human (Conc), 150 Units, Subcutaneous, Daily Before Supper  Insulin Regular Human (Conc), 150 Units, Subcutaneous, QAM AC  isosorbide mononitrate, 120 mg, Oral, Q24H  lamoTRIgine, 100 mg, Oral, QAM  lamoTRIgine, 50 mg, Oral, Q PM  levETIRAcetam, 1,000 mg, Oral, BID  lisinopril, 5 mg, Oral, Nightly  metoprolol succinate XL, 200 mg, Oral, Q24H  pantoprazole, 40 mg, Oral, QAM  ranolazine, 1,000 mg, Oral, Q12H  sodium chloride, 10 mL, Intravenous, Q12H  sodium chloride, 10 mL, Intravenous, Q12H  spironolactone, 25 mg,  Oral, Daily  vitamin D3, 5,000 Units, Oral, Daily  vitamin E, 400 Units, Oral, Daily  Vortioxetine HBr, 20 mg, Oral, Daily    sodium chloride, 100 mL/hr        Current Antimicrobial Therapy:  Anti-Infectives (From admission, onward)    None        Current Diuretic Therapy:  Diuretics (From admission, onward)    Ordered     Dose/Rate Route Frequency Start Stop    06/07/22 1638  spironolactone (ALDACTONE) tablet 25 mg        Ordering Provider: Joel Turpin MD    25 mg Oral Daily 06/08/22 0900          ----------------------------------------------------------------------------------------------------------------------  Vital Signs:  Temp:  [97.5 °F (36.4 °C)-98.8 °F (37.1 °C)] 98.4 °F (36.9 °C)  Heart Rate:  [68-76] 76  Resp:  [12-21] 14  BP: (103-184)/(48-91) 140/69  SpO2:  [94 %-100 %] 100 %  on  Flow (L/min):  [2] 2;   Device (Oxygen Therapy): nasal cannula  Body mass index is 44.84 kg/m².    Wt Readings from Last 3 Encounters:   06/14/22 (!) 142 kg (312 lb 8 oz)   03/19/22 132 kg (290 lb)   02/10/22 (!) 138 kg (304 lb)     Intake & Output (last 3 days)       06/11 0701 06/12 0700 06/12 0701 06/13 0700 06/13 0701 06/14 0700 06/14 0701  06/15 0700    P.O. 1740 2580 1215     I.V. (mL/kg) 0 (0)   83 (0.6)    Total Intake(mL/kg) 1740 (12.4) 2580 (18.4) 1215 (8.6) 83 (0.6)    Urine (mL/kg/hr) 2700 (0.8) 3650 (1.1) 3150 (0.9) 750 (1)    Total Output 2700 3650 3150 750    Net -960 -9960 -6035 -149                Diet Regular; Consistent Carbohydrate, Cardiac  ----------------------------------------------------------------------------------------------------------------------  Physical exam:  Constitutional:  Well-developed and well-nourished.  No respiratory distress.      HENT:  Head:  Normocephalic and atraumatic.  Mouth:  Moist mucous membranes.    Eyes:  Conjunctivae and EOM are normal. No scleral icterus.    Neck:  Neck supple.  No JVD present.    Cardiovascular:  Normal rate, regular rhythm and  normal heart sounds with no murmur.  Pulmonary/Chest:  No respiratory distress, no wheezes, no crackles, with normal breath sounds and good air movement.  Abdominal:  Soft.  Bowel sounds are normal.  No distension and no tenderness.   Musculoskeletal:  No edema, no tenderness, and no deformity.  No red or swollen joints anywhere.    Neurological:  Alert and oriented to person, place, and time.  No cranial nerve deficit.  No tongue deviation.  No facial droop.  No slurred speech.   Skin:  Skin is warm and dry. No rash noted. No pallor.   Peripheral vascular:  Pulses in all 4 extremities with no clubbing, no cyanosis, no edema.  ----------------------------------------------------------------------------------------------------------------------  Tele:    ----------------------------------------------------------------------------------------------------------------------  Results from last 7 days   Lab Units 06/14/22  0033 06/12/22  0050 06/11/22  0038   WBC 10*3/mm3 8.70 8.55 6.75   HEMOGLOBIN g/dL 10.9* 11.7* 11.6*   HEMATOCRIT % 34.4* 36.5* 37.5   MCV fL 81.5 80.2 82.8   MCHC g/dL 31.7 32.1 30.9*   PLATELETS 10*3/mm3 176 179 142         Results from last 7 days   Lab Units 06/14/22  0033 06/12/22  0050 06/11/22  0326 06/10/22  0021 06/08/22  0743 06/08/22  0203   SODIUM mmol/L 135* 138 141 138  --  137   POTASSIUM mmol/L 4.3 4.5 4.3 3.8  --  4.2   MAGNESIUM mg/dL  --   --   --   --  2.1  --    CHLORIDE mmol/L 101 103 104 104  --  103   CO2 mmol/L 23.9 27.3 27.7 22.3  --  24.8   BUN mg/dL 16 9 8 10  --  11   CREATININE mg/dL 0.85 0.73* 0.70* 0.75*  --  0.66*   CALCIUM mg/dL 8.9 9.1 9.3 8.6  --  9.1   GLUCOSE mg/dL 120* 103* 129* 141*  --  242*   ALBUMIN g/dL  --  3.37*  --  3.04*  --  3.42*   BILIRUBIN mg/dL  --  0.6  --  0.4  --  0.5   ALK PHOS U/L  --  89  --  84  --  104   AST (SGOT) U/L  --  26  --  37  --  30   ALT (SGPT) U/L  --  29  --  37  --  37   Estimated Creatinine Clearance: 130.4 mL/min (by C-G  formula based on SCr of 0.85 mg/dL).  No results found for: AMMONIA  Results from last 7 days   Lab Units 06/11/22  1649 06/11/22  0429 06/08/22  0203   TROPONIN T ng/mL <0.010 <0.010 <0.010         Results from last 7 days   Lab Units 06/08/22  0203   CHOLESTEROL mg/dL 97   TRIGLYCERIDES mg/dL 100   HDL CHOL mg/dL 29*   LDL CHOL mg/dL 49     Glucose   Date/Time Value Ref Range Status   06/14/2022 0619 91 70 - 130 mg/dL Final     Comment:     Meter: [id number] : [id number] EVA HUITRON   06/13/2022 2122 113 70 - 130 mg/dL Final     Comment:     Meter: [id number] : [id number] EVA ELANA   06/13/2022 1654 160 (H) 70 - 130 mg/dL Final     Comment:     Meter: [id number] : [id number] LILLIE IRENE   06/13/2022 1042 203 (H) 70 - 130 mg/dL Final     Comment:     Meter: [id number] : [id number] LILLIE IRENE   06/13/2022 0611 162 (H) 70 - 130 mg/dL Final     Comment:     Meter: [id number] : [id number] MAGGI FELIPE   06/12/2022 2314 77 70 - 130 mg/dL Final     Comment:     Meter: [id number] : [id number] MAGGI FELIPE   06/12/2022 2144 84 70 - 130 mg/dL Final     Comment:     Meter: [id number] : [id number] MAGGI FELIPE   06/12/2022 1734 88 70 - 130 mg/dL Final     Comment:     Meter: [id number] : [id number] LILLIEPATRICK GAYTANDLE     Lab Results   Component Value Date    TSH 0.656 06/08/2022    FREET4 0.99 04/11/2017     No results found for: PREGTESTUR, PREGSERUM, HCG, HCGQUANT  Pain Management Panel     Pain Management Panel Latest Ref Rng & Units 10/15/2019 9/14/2019    AMPHETAMINES SCREEN, URINE Negative Negative Negative    BARBITURATES SCREEN Negative Negative Negative    BENZODIAZEPINE SCREEN, URINE Negative Negative Negative    BUPRENORPHINEUR Negative Negative Negative    COCAINE SCREEN, URINE Negative Negative Negative    METHADONE SCREEN, URINE Negative Negative Negative    METHAMPHETAMINEUR Negative Negative Negative        Brief Urine Lab Results  (Last result in the past 365 days)       Color   Clarity   Blood   Leuk Est   Nitrite   Protein   CREAT   Urine HCG        03/19/22 1833 Yellow   Clear   Negative   Trace   Negative   Negative               No results found for: BLOODCX  No results found for: URINECX  No results found for: WOUNDCX  No results found for: STOOLCX  No results found for: RESPCX  No results found for: AFBCX        I have personally looked at the labs and they are summarized above.  ----------------------------------------------------------------------------------------------------------------------  Detailed radiology reports for the last 24 hours:    Imaging Results (Last 24 Hours)     ** No results found for the last 24 hours. **        Assessment & Plan    #Chest pain  #CAD s/p CABG with recent LHC revealing diffusal distal LAD disease and moderate to high grade distal RCA stenosis    #History Postop Pericarditis status post pericardial window 2017  - Cardiology consulted and took for PCI of on 6/14 with IFR of RCA 0.7, opted to stent lesion.   - Patient with post operative back pain that I suspect is from laying flat in setting of DDD. Will get stat EKG, give one time dose of dilaudid, and repeat CBC in 1 hour given femoral access site.   - Continue imdur, ranexa, ASA, statin.      #Chronic HFpEF  - Appears compensated. Will diurese PRN.      #HTN  - Given relative hypotension, lisinopril decreased.      #DM II  - Patient on large amounts of concentrated insulin. 150 units of Humulin R TID.      #Hx of CVA  - No residual symptoms. Continue ASA, statin     #EDD  - Continue QHS CPAP     F: PO  E: Replace as needed   N: CC     Code status: Full      Dispo: Pending clinical improvement       VTE Prophylaxis:   Mechanical Order History:      Ordered        06/10/22 1403  Place Sequential Compression Device  Once,   Status:  Canceled            06/10/22 1403  Maintain Sequential Compression Device  Continuous,   Status:  Canceled                    Pharmalogical Order  History:      Ordered     Dose Route Frequency Stop    06/14/22 1114  heparin (porcine) injection  Status:  Discontinued         -- -- As Needed 06/14/22 1135    06/14/22 1056  heparin (porcine) injection  Status:  Discontinued         -- -- As Needed 06/14/22 1135    06/07/22 1353  Enoxaparin Sodium (LOVENOX) syringe 40 mg         40 mg SC Every 24 Hours --                Saturnino Beckman MD  [facility]  06/14/22  12:20 EDT

## 2022-06-15 NOTE — PLAN OF CARE
Problem: Adult Inpatient Plan of Care  Goal: Plan of Care Review  Outcome: Ongoing, Progressing  Flowsheets (Taken 6/15/2022 0244)  Progress: improving  Plan of Care Reviewed With: patient  Goal: Patient-Specific Goal (Individualized)  Outcome: Ongoing, Progressing  Goal: Absence of Hospital-Acquired Illness or Injury  Outcome: Ongoing, Progressing  Intervention: Identify and Manage Fall Risk  Description: Perform standard risk assessment on admission using a validated tool or comprehensive approach appropriate to the patient; reassess fall risk frequently, with change in status or transfer to another level of care.  Communicate fall injury risk to interprofessional healthcare team.  Determine need for increased observation, equipment and environmental modification, such as low bed, signage and supportive, nonskid footwear.  Adjust safety measures to individual developmental age, stage and identified risk factors.  Reinforce the importance of safety and physical activity with patient and family.  Perform regular intentional rounding to assess need for position change, pain assessment and personal needs, including assistance with toileting.  Recent Flowsheet Documentation  Taken 6/15/2022 0100 by Fern Mathew RN  Safety Promotion/Fall Prevention: safety round/check completed  Taken 6/14/2022 2300 by Fern Mathew RN  Safety Promotion/Fall Prevention: safety round/check completed  Taken 6/14/2022 2100 by Fern Mathew RN  Safety Promotion/Fall Prevention: safety round/check completed  Taken 6/14/2022 1900 by Fern Mathew RN  Safety Promotion/Fall Prevention:   safety round/check completed   room organization consistent   nonskid shoes/slippers when out of bed   lighting adjusted   fall prevention program maintained   clutter free environment maintained   activity supervised  Intervention: Prevent Infection  Description: Maintain skin and mucous membrane integrity; promote hand, oral and pulmonary  hygiene.  Optimize fluid balance, nutrition, sleep and glycemic control to maximize infection resistance.  Identify potential sources of infection early to prevent or mitigate progression of infection (e.g., wound, lines, devices).  Evaluate ongoing need for invasive devices; remove promptly when no longer indicated.  Recent Flowsheet Documentation  Taken 6/14/2022 1900 by Fern Mathew, RN  Infection Prevention:   rest/sleep promoted   single patient room provided  Goal: Optimal Comfort and Wellbeing  Outcome: Ongoing, Progressing  Goal: Readiness for Transition of Care  Outcome: Ongoing, Progressing     Problem: Fall Injury Risk  Goal: Absence of Fall and Fall-Related Injury  Outcome: Ongoing, Progressing  Intervention: Identify and Manage Contributors  Description: Develop a fall prevention plan with the patient and caregiver/family.  Provide reorientation, appropriate sensory stimulation and routines with changes in mental status to decrease risk of fall.  Promote use of personal vision and auditory aids.  Assess assistance level required for safe and effective self-care; provide support as needed, such as toileting, mobilization. For age 65 and older, implement timed toileting with assistance.  Encourage physical activity, such as performance of mobility and self-care at highest level of patient ability, multicomponent exercise program and provision of appropriate assistive devices.  If fall occurs, assess the severity of injury; implement fall injury protocol. Determine the cause and revise fall injury prevention plan.  Regularly review medication contribution to fall risk; adjust medication administration times to minimize risk of falling.  Consider risk related to polypharmacy and age.  Balance adequate pain management with potential for oversedation.  Recent Flowsheet Documentation  Taken 6/14/2022 1900 by Fern Mathew, RN  Medication Review/Management: medications reviewed  Intervention: Promote  Injury-Free Environment  Description: Provide a safe, barrier-free environment that encourages independent activity.  Keep care area uncluttered and well-lighted.  Determine need for increased observation or monitoring.  Avoid use of devices that minimize mobility, such as restraints or indwelling urinary catheter.  Recent Flowsheet Documentation  Taken 6/15/2022 0100 by Fern Mathew RN  Safety Promotion/Fall Prevention: safety round/check completed  Taken 6/14/2022 2300 by Fern Mathew RN  Safety Promotion/Fall Prevention: safety round/check completed  Taken 6/14/2022 2100 by Fern Mathew RN  Safety Promotion/Fall Prevention: safety round/check completed  Taken 6/14/2022 1900 by Fern Mathew RN  Safety Promotion/Fall Prevention:   safety round/check completed   room organization consistent   nonskid shoes/slippers when out of bed   lighting adjusted   fall prevention program maintained   clutter free environment maintained   activity supervised     Problem: Skin Injury Risk Increased  Goal: Skin Health and Integrity  Outcome: Ongoing, Progressing  Intervention: Optimize Skin Protection  Description: Perform a full pressure injury risk assessment, as indicated by screening, upon admission to care unit.  Reassess skin (injury risk, full inspection) frequently (e.g., scheduled interval, with change in condition) to provide optimal early detection and prevention.  Maintain adequate tissue perfusion (e.g., encourage fluid balance; avoid crossing legs, constrictive clothing or devices) to promote tissue oxygenation.  Maintain head of bed at lowest degree of elevation tolerated, considering medical condition and other restrictions.  Avoid positioning onto an area that remains reddened.  Minimize incontinence and moisture (e.g., toileting schedule; moisture-wicking pad, diaper or incontinence collection device; skin moisture barrier).  Cleanse skin promptly and gently when soiled utilizing a pH-balanced  cleanser.  Relieve and redistribute pressure (e.g., scheduled position changes, weight shifts, use of support surface, medical device repositioning, protective dressing application, use of positioning device, microclimate control, use of pressure-injury-monitor  Encourage increased activity, such as sitting in a chair at the bedside or early mobilization, when able to tolerate.  Recent Flowsheet Documentation  Taken 6/14/2022 2000 by Fern Mathew RN  Pressure Reduction Techniques: weight shift assistance provided   Goal Outcome Evaluation:  Plan of Care Reviewed With: patient   Monitoring for CP, FSBS Q AC&HS, VSS this shift, cath access site left groin no bleeding or hematoma noted.     Progress: improving

## 2022-06-15 NOTE — CASE MANAGEMENT/SOCIAL WORK
Continued Stay Note  AMANDA Anthony     Patient Name: Denzel Harding  MRN: [id number]  Today's Date: 6/15/2022    Admit Date: 6/7/2022     Discharge Plan     Row Name 06/15/22 0901       Plan    Plan Patient is being discharged home today with good family support. Family will provide transportation home. His wife, Carol Harding, states she will bring portable O2 tank for transportation home. No further needs identified.    Patient/Family in Agreement with Plan yes               Discharge Codes    No documentation.               Expected Discharge Date and Time     Expected Discharge Date Expected Discharge Time    Marquez 15, 2022             Ibeth Avitia RN

## 2022-06-15 NOTE — PROGRESS NOTES
LOS: 8 days     Name: Denzel Harding  Age/Sex: 62 y.o. male  :  1960        PCP: Isaura Godoy MD  REF: No Known Provider    Principal Problem:    Chest pain, unspecified type  Active Problems:    Abnormal nuclear stress test    Unstable angina (HCC)      Reason for follow-up: Chest pain    Subjective       Subjective     Denzel Harding 62-year-old male with a past medical history significant for coronary artery disease status post CABG x3 with LAD endarterectomy, postop pericarditis with effusion status post left anterior thoracotomy and pericardial window in 2017, essential hypertension, diabetes mellitus type 2, history of CVA, COPD, obstructive sleep apnea and hyperlipidemia.  Patient states that he is on his way to a doctor's appointment this a.m. when he developed sudden onset chest pain in the middle of his chest.    Interval History: Patient underwent repeat left heart catheterization yesterday with PCI to the RCA. Denies any further chest pain. Blood pressure stable.       Cath: 2022  PRE PROCEDURE DIAGNOSIS:  CAD s/p CABG with recent cath showed patent LIMA - LAD - but severe diffuse disease in distal LAD, small calibre, Distal RCA had two tandem 60 and 80% stenosis prior to bifurcation , R PDA and PL diffuse disease    POST PROCEDURE DIAGNOSIS:  CAD s/p RCA PCI with 3.25 x 23 mm LELE      Face to face mdoerate conscious  sedation time :      COMPLICATIONS : None     Specimens collected : None     PROCEDURE PERFORMED:      1. iFR of RCA - 0.7   2. PCI of RCA with 3.25 x 23 mm LELE     Description of the procedure:  Prior to the procedure risk, benefits and possible alternative were discussed with the patient and informed consent was obtained. Patient was brought to cardiac cath lab table in post absorbtive state. Patient was prepped and drape in usual sterile fashion. IV Versed and Fentanyl was used for moderate sedation. 2% Lidocaine was used for topical anesthesia     I initially tried  to access the right radial artery but the artery was very small caliber vessel with ultrasound visualization and there was very slow flow.  I then decided to use access the left femoral artery.  Using ultrasound the left femoral artery was accessed in the left SFA, patient was morbidly obese and I did want to do a high stick.  Left femoral angiogram was then performed with a micropuncture sheath and confirmed placement below the bifurcation in the left SFA.  I then used a 6 [place]er JR4 guide for right coronary angiogram and PCI as described below.     PERCUTANEOUS CORONARY INTERVENTION PROCEDURE NOTE:     Patient is RCA angiogram demonstrated distal 2 tandem 60 and 80% stenosis prior to the bifurcation, the RPDA and PL branches were diffusely diseased though, I then did an IFR of the distal RCA lesion which came out at 0.7.  Patient received total of 12,000 units of heparin.  Decided to intervene on the distal RCA, used a 3 oh by 20 balloon to do predilatation at 16 anay.  With the help of 6 [place] Islander guiding her I then delivered a 3.25 x 23 mm Xience lizabeth point drug-eluting stent to the distal RCA posted with the same stent balloon at 18 anay.     Post stent deployment angio pictures showed good expansion with 0% residual stenosis, ESTRELLA 3 flow in the distal vascular bed and no dissection or distal wire perforation.      Lesion length: 20 mm  Pre PCI Stenosis: 80 %  Post PCI stenosis: 0 %  Pre PCI ESTRELLA flow: 3  Post PCI ESTRELLA flow: 3     EBL: Less than 10cc     Recommendations:  Aggressive guideline directed medical management for CAD      Vital Signs  Temp:  [96.5 °F (35.8 °C)-98.7 °F (37.1 °C)] 98.7 °F (37.1 °C)  Heart Rate:  [68-80] 69  Resp:  [10-18] 10  BP: (103-162)/(55-85) 130/76  Arterial Line BP: (108-143)/(59-69) 137/66     Vital Signs (last 72 hrs)       06/12 0700  06/13 0659 06/13 0700  06/14 0659 06/14 0700  06/15 0659 06/15 0700  06/15 0905   Most Recent      Temp (°F) 98 -  98.9    97.5 -  98.8    96.5 -   98.5      98.7     98.7 (37.1) 06/15 0800    Heart Rate 68 -  96    68 -  78    68 -  80      69     69 06/15 0703    Resp 10 -  22    12 -  24    12 -  18      10     10 06/15 0703    BP 90/49 -  174/87    97/73 -  159/80    103/55 -  184/91      130/76     130/76 06/15 0703    SpO2 (%) 92 -  100    94 -  100    87 -  100      99     99 06/15 0703        Documented weights    06/07/22 0906 06/07/22 1358 06/08/22 0400 06/09/22 0400   Weight: 132 kg (290 lb) (!) 140 kg (308 lb) (!) 139 kg (306 lb 11.2 oz) (!) 137 kg (301 lb)    06/10/22 0500 06/11/22 0503 06/12/22 0500 06/13/22 0503   Weight: (!) 141 kg (310 lb 6.4 oz) (!) 141 kg (311 lb 3.2 oz) (!) 140 kg (309 lb 9.6 oz) (!) 140 kg (307 lb 12.8 oz)    06/14/22 0400 06/15/22 0400   Weight: (!) 142 kg (312 lb 8 oz) (!) 140 kg (308 lb 12.8 oz)      Body mass index is 44.31 kg/m².    Intake/Output Summary (Last 24 hours) at 6/15/2022 0905  Last data filed at 6/15/2022 0749  Gross per 24 hour   Intake 743 ml   Output 2175 ml   Net -1432 ml     Objective    Objective       Physical Exam:     General Appearance:    Alert, cooperative, in no acute distress   Head:    Normocephalic, without obvious abnormality, atraumatic   Eyes:            Conjunctivae and sclerae normal, no   icterus, no pallor, corneas clear.   Neck:   No adenopathy, supple, trachea midline, no thyromegaly, no   carotid bruit, no JVD   Lungs:     Clear to auscultation,respirations regular, even and                  unlabored    Heart:    Regular rhythm and normal rate, normal S1 and S2, no            murmur, no gallop, no rub, no click   Chest Wall:    No abnormalities observed   Abdomen:     Normal bowel sounds, no masses, no organomegaly, soft        nontender, nondistended, no guarding, no rebound                tenderness   Extremities:   Moves all extremities well, no edema, no cyanosis, no             redness   Pulses:   Pulses palpable and equal bilaterally   Skin:   No bleeding, bruising or rash        Neurologic:   Alert and oriented      Results review       Results Review:   Results from last 7 days   Lab Units 06/14/22  2358 06/14/22  1511 06/14/22  0033 06/12/22  0050 06/11/22  0038 06/10/22  0021   WBC 10*3/mm3 10.12 8.70 8.70 8.55 6.75 7.05   HEMOGLOBIN g/dL 12.3* 11.4* 10.9* 11.7* 11.6* 11.5*   PLATELETS 10*3/mm3 222 181 176 179 142 156     Results from last 7 days   Lab Units 06/14/22  2357 06/14/22  0033 06/12/22  0050 06/11/22  0326 06/10/22  0021   SODIUM mmol/L 138 135* 138 141 138   POTASSIUM mmol/L 4.3 4.3 4.5 4.3 3.8   CHLORIDE mmol/L 101 101 103 104 104   CO2 mmol/L 25.3 23.9 27.3 27.7 22.3   BUN mg/dL 15 16 9 8 10   CREATININE mg/dL 0.81 0.85 0.73* 0.70* 0.75*   CALCIUM mg/dL 9.2 8.9 9.1 9.3 8.6   GLUCOSE mg/dL 123* 120* 103* 129* 141*   ALT (SGPT) U/L  --   --  29  --  37   AST (SGOT) U/L  --   --  26  --  37     Results from last 7 days   Lab Units 06/11/22  1649 06/11/22  0429   TROPONIN T ng/mL <0.010 <0.010     Lab Results   Component Value Date    INR 1.05 02/27/2021    INR 0.98 06/17/2020    INR 1.01 10/15/2019    INR 0.97 01/28/2018    INR 1.1 01/28/2018    INR 1.1 12/17/2017    INR 0.97 08/16/2017     Lab Results   Component Value Date    MG 2.1 06/08/2022    MG 1.9 06/07/2022    MG 1.8 03/19/2022     Lab Results   Component Value Date    TSH 0.656 06/08/2022    CHLPL 123 07/01/2021    TRIG 111 06/14/2022    HDL 30 (L) 06/14/2022    LDL 58 06/14/2022      Imaging Results (Last 48 Hours)     Procedure Component Value Units Date/Time    XR Chest 1 View [[id number]] Collected: 06/14/22 1617     Updated: 06/14/22 1620    Narrative:      EXAM:    XR Chest, 1 View     EXAM DATE:    6/14/2022 2:58 PM     CLINICAL HISTORY:    back pain; R07.9-Chest pain, unspecified; R94.39-Abnormal result of  other cardiovascular function study; I20.0-Unstable angina     TECHNIQUE:    Frontal view of the chest.     COMPARISON:    06/07/2022     FINDINGS:    Lungs:  Low lung volumes.  Mild pulmonary  vascular congestion.    Pleural space:  Unremarkable.  No pneumothorax.    Heart:  Cardiomegaly.    Mediastinum:  Unremarkable.    Bones/joints:  Changes of prior median sternotomy.       Impression:        Changes of mild CHF noted. No significant airspace disease or pleural  effusions identified.     This report was finalized on 6/14/2022 4:18 PM by Dr. Leonel Vazquez MD.           Lab Results   Component Value Date    BNP 10.0 06/09/2018     Echo   Results for orders placed during the hospital encounter of 06/07/22    Adult Transthoracic Echo Complete W/ Cont if Necessary Per Protocol    Interpretation Summary  · Left ventricular ejection fraction appears to be 61 - 65%. Left ventricular systolic function is normal.  · Left ventricular diastolic function is consistent with (grade II w/high LAP) pseudonormalization.  · This is a technically limited study.     I reviewed the patient's new clinical results.    Telemetry: NSR 70's     Medication Review:   vitamin C, 500 mg, Oral, Daily  aspirin, 81 mg, Oral, Daily  atorvastatin, 80 mg, Oral, Daily  budesonide-formoterol, 2 puff, Inhalation, BID - RT  cetirizine, 10 mg, Oral, Nightly  clopidogrel, 75 mg, Oral, Daily  gabapentin, 600 mg, Oral, Q8H  Insulin Regular Human (Conc), 150 Units, Subcutaneous, Daily Before Lunch  Insulin Regular Human (Conc), 150 Units, Subcutaneous, Daily Before Supper  Insulin Regular Human (Conc), 150 Units, Subcutaneous, QAM AC  isosorbide mononitrate, 120 mg, Oral, Q24H  lamoTRIgine, 100 mg, Oral, QAM  lamoTRIgine, 50 mg, Oral, Q PM  levETIRAcetam, 1,000 mg, Oral, BID  lidocaine, 1 patch, Transdermal, Q24H  lisinopril, 5 mg, Oral, Nightly  metoprolol succinate XL, 200 mg, Oral, Q24H  pantoprazole, 40 mg, Oral, QAM  ranolazine, 1,000 mg, Oral, Q12H  sodium chloride, 10 mL, Intravenous, Q12H  sodium chloride, 10 mL, Intravenous, Q12H  spironolactone, 25 mg, Oral, Daily  vitamin D3, 5,000 Units, Oral, Daily  vitamin E, 400 Units, Oral,  Daily  Vortioxetine HBr, 20 mg, Oral, Daily        sodium chloride, 100 mL/hr, Last Rate: 100 mL/hr (06/14/22 1251)        Assessment      Assessment:          Plan     Recommendations:      I discussed the patient's findings and my recommendations with patient and family      Electronically signed by ANTONIO Coleman, 06/15/22, 9:06 AM EDT.    Please note that portions of this note were completed with a voice recognition program.

## 2022-06-15 NOTE — PLAN OF CARE
Problem: Adult Inpatient Plan of Care  Goal: Plan of Care Review  Outcome: Adequate for Care Transition  Goal: Patient-Specific Goal (Individualized)  Outcome: Adequate for Care Transition  Goal: Absence of Hospital-Acquired Illness or Injury  Outcome: Adequate for Care Transition  Goal: Optimal Comfort and Wellbeing  Outcome: Adequate for Care Transition  Goal: Readiness for Transition of Care  Outcome: Adequate for Care Transition     Problem: Fall Injury Risk  Goal: Absence of Fall and Fall-Related Injury  Outcome: Adequate for Care Transition     Problem: Skin Injury Risk Increased  Goal: Skin Health and Integrity  Outcome: Adequate for Care Transition   Goal Outcome Evaluation:

## 2022-06-15 NOTE — DISCHARGE INSTRUCTIONS
Please take medications as prescribed. Please go to follow-up appointments as recommended. Please seek medical attention if you have chest pain, shortness of breath or worsening of any symptoms.     Please continue social distancing and isolation efforts as recommended by CDC and [facility] to help prevent spread of COVID-19.

## 2022-06-15 NOTE — OUTREACH NOTE
Prep Survey    Flowsheet Row Responses   [facility] facility patient discharged from? [facility]   Is LACE score < 7 ? No   Emergency Room discharge w/ pulse ox? No   Eligibility TCM   [facility]   Date of Admission 06/07/22   Date of Discharge 06/15/22   Discharge Disposition Home or Self Care   Discharge diagnosis Chest pain-left heart cath this visit   Does the patient have one of the following disease processes/diagnoses(primary or secondary)? Other   Does the patient have Home health ordered? No   Is there a DME ordered? No   Prep survey completed? Yes          MOLLY CROCKETT - Registered Nurse

## 2022-06-15 NOTE — PAYOR COMM NOTE
"CONTACT:  MARY MURHPY MSN, APRN  UTILIZATION MANAGEMENT DEPT.  [facility]  [address] [facility] [place], [address]  PHONE:  [phone]  FAX: [phone]    PATIENT DISCHARGED TO HOME ON 6/15/22, AWAITING AUTHORIZATION.    REFERENCE # [id number]    Denzel Harding (62 y.o. Male)             Date of Birth   1960    Social Security Number       Address   [address] [address]    Home Phone   [phone]    MRN   [id number]       [facility]    Marital Status                               Admission Date   6/7/22    Admission Type   Emergency    Admitting Provider   Denys Johns MD    Attending Provider       Department, Room/Bed   [facility] PROGRESS CARE, P207/1P       Discharge Date   6/15/2022    Discharge Disposition   Home or Self Care    Discharge Destination                               Attending Provider: (none)   Allergies: Ajovy [Fremanezumab-vfrm], Sulfa Antibiotics, Invokana [Canagliflozin], Codeine, Morphine    Isolation: None   Infection: None   Code Status: CPR   Advance Care Planning Activity    Ht: 177.8 cm (70\")   Wt: 140 kg (308 lb 12.8 oz)    Admission Cmt: None   Principal Problem: Chest pain, unspecified type [R07.9]                 Active Insurance as of 6/7/2022     Primary Coverage     Payor Plan Insurance Group Employer/Plan Group    ANTHEM MEDICARE REPLACEMENT ANTHEM MEDICARE ADVANTAGE [id number]     Payor Plan Address Payor Plan Phone Number Payor Plan Fax Number Effective Dates    [address] [phone]  1/1/2016 - None Entered    [facility] [address]       Subscriber Name Subscriber Birth Date Member ID       DENZEL HARDING 1960 [id number]           Secondary Coverage     Payor Plan Insurance Group Employer/Plan Group    [id number]      Payor Plan Address Payor Plan Phone Number Payor Plan Fax Number Effective Dates    [address]   7/1/2021 - None Entered    [facility] [id number]   "     Subscriber Name Subscriber Birth Date Member ID       DENZEL HASSAN 1960 [id number]                 Emergency Contacts      (Rel.) Home Phone Work Phone Mobile Phone    Carol Hassan (Spouse) [phone] -- [phone]    MANN HASSAN (Daughter) [phone] -- --               Discharge Summary      Saturnino Beckman MD at 06/15/22 0854              [facility] DISCHARGE SUMMARY    Patient Identification:  Name:  Denzel Hassan  Age:  62 y.o.  Sex:  male  :  1960  MRN:  [id number]  Visit Number:  [id number]    Date of Admission: 2022  Date of Discharge:  6/15/2022    PCP: Isaura Godoy MD    DISCHARGE DIAGNOSIS  #Chest pain   #CAD s/p CABG with recent LHC revealing diffusal distal LAD disease and moderate to high grade distal RCA stenosis    #History Postop Pericarditis status post pericardial window   #Chronic HFpEF  #HTN  #DM II  #Hx of CVA  #EDD    CONSULTS   Cardiology     PROCEDURES PERFORMED  Echo :  · Left ventricular ejection fraction appears to be 61 - 65%. Left ventricular systolic function is normal.  · Left ventricular diastolic function is consistent with (grade II w/high LAP) pseudonormalization.  · This is a technically limited study.    Stress test :  · Left ventricular ejection fraction is borderline normal. (Calculated EF = 50%).  · Impressions are consistent with an intermediate risk study.  · There is no prior study available for comparison.  · Mild fixed mid anterior wall defect with significant mid anterior, distal anterior, apical, apical apical septal walls ischemia, TID 1.13.  · Findings consistent with a normal ECG stress test.     Left heart cath :  · Mid LAD to Dist LAD lesion is 75% stenosed beyond th anastomosis of LIMA. There is heavy calcification in this vessel and not a good target for intervention  · Mid LAD lesion is 100% stenosed.  · 1st Diag lesion is 100% stenosed.  · Dist RCA lesion is 60% stenosed  in a large non-grafted vessel  · There is a single vein graft to OM with a jump to Diagonal. OM is patent with good flow and Diagonal limb is patent but distal vessel is diffusely diseased and occluded.  · BASURTO to LAD is a good looking graft which is widely patent but beyond anastomosis there is heavy disease.  · No good options. Recommend medical management.  · No good targets for redo or intervention.  · EBL 30 ml  · Right femoral approach with Mynx         Left heart cath w/ PCI 6/15:  Patient is RCA angiogram demonstrated distal 2 tandem 60 and 80% stenosis prior to the bifurcation, the RPDA and PL branches were diffusely diseased though, I then did an IFR of the distal RCA lesion which came out at 0.7.  Patient received total of 12,000 units of heparin.  Decided to intervene on the distal RCA, used a 3 oh by 20 balloon to do predilatation at 16 anay.  With the help of 6 Faroese guiding her I then delivered a 3.25 x 23 mm Xience lizabeth point drug-eluting stent to the distal RCA posted with the same stent balloon at 18 anay.     Post stent deployment angio pictures showed good expansion with 0% residual stenosis, ESTRELLA 3 flow in the distal vascular bed and no dissection or distal wire perforation.       HOSPITAL COURSE  Patient is a 62 y.o. male presented on 6/7 to [facility] complaining of chest pain and shortness of breath.  Please see the admitting history and physical for further details.      Mr. Harding is our 61 yo M with hx of CAD s/p remote stenting x2 in 2002 followed by CABG x 3V in 2017, COPD, CHF, CVA, hx of tobacco use, morbid obesity, HTN, HLD, EDD, DM II who presented with chest pain. Patient underwent stress test that was positive with anterior defect. LHC revealed diffusal distal LAD disease and moderate to high grade distal RCA stenosis. Cardiology discussed case with [facility] cardiology providers. Ultimately, due to persistent anginal symptoms, cardiology opted to intervene on RCA  obstruction, iFR was 0.7. PCI performed to RCA on 6/14. Continue ASA, statin, plavix. Continue metoprolol. Continue imdur and added ranexa. Today patient reports feeling better and agreeable to go home. Denies any concerns. Has appointment with his cardiologist on 7/11. Will have patient f/u with PCP in 1 week.         VITAL SIGNS:  Temp:  [96.5 °F (35.8 °C)-98.7 °F (37.1 °C)] 98.7 °F (37.1 °C)  Heart Rate:  [68-80] 69  Resp:  [10-18] 10  BP: (103-162)/(55-85) 130/76  Arterial Line BP: (108-143)/(59-69) 137/66  SpO2:  [87 %-100 %] 99 %  on  Flow (L/min):  [2] 2;   Device (Oxygen Therapy): nasal cannula    Body mass index is 44.31 kg/m².  Wt Readings from Last 3 Encounters:   06/15/22 (!) 140 kg (308 lb 12.8 oz)   03/19/22 132 kg (290 lb)   02/10/22 (!) 138 kg (304 lb)       PHYSICAL EXAM:  Constitutional:  Well-developed and well-nourished.  No respiratory distress.      HENT:  Head:  Normocephalic and atraumatic.  Mouth:  Moist mucous membranes.    Eyes:  Conjunctivae and EOM are normal.  Pupils are equal, round, and reactive to light.  No scleral icterus.    Cardiovascular:  Normal rate, regular rhythm and normal heart sounds with no murmur.  Pulmonary/Chest:  No respiratory distress, no wheezes, no crackles, with normal breath sounds and good air movement.  Abdominal:  Soft.  Bowel sounds are normal.  No distension and no tenderness.   Musculoskeletal:  No edema, no tenderness, and no deformity.  No red or swollen joints anywhere.    Neurological:  Alert and oriented to person, place, and time.  No gross neurological deficit.   Skin:  Skin is warm and dry. No rash noted. No pallor.   Peripheral vascular:  Strong pulses in all 4 extremities with no clubbing, no cyanosis, no edema.    DISCHARGE DISPOSITION   Stable    DISCHARGE MEDICATIONS:     Discharge Medications      New Medications      Instructions Start Date   ranolazine 1000 MG 12 hr tablet  Commonly known as: RANEXA   1,000 mg, Oral, Every 12 Hours  Scheduled         Changes to Medications      Instructions Start Date   isosorbide mononitrate 120 MG 24 hr tablet  Commonly known as: IMDUR  What changed:   · medication strength  · how much to take  · when to take this   120 mg, Oral, Every 24 Hours Scheduled   Start Date: June 16, 2022        Continue These Medications      Instructions Start Date   albuterol sulfate  (90 Base) MCG/ACT inhaler  Commonly known as: Ventolin HFA   2 puffs, Inhalation, Every 4 Hours PRN      aspirin 81 MG EC tablet   81 mg, Oral, Daily      atorvastatin 80 MG tablet  Commonly known as: LIPITOR   80 mg, Oral, Nightly      budesonide-formoterol 80-4.5 MCG/ACT inhaler  Commonly known as: Symbicort   2 puffs, Inhalation, 2 Times Daily - RT, Rinse mouth with water after use.      clopidogrel 75 MG tablet  Commonly known as: PLAVIX   75 mg, Oral, Daily      coenzyme Q10 100 MG capsule   100 mg, Oral, Daily      Dupixent 300 MG/2ML solution pen-injector  Generic drug: Dupilumab   300 mg, Subcutaneous, Every 14 Days      furosemide 40 MG tablet  Commonly known as: LASIX   40 mg, Oral, Daily PRN      gabapentin 600 MG tablet  Commonly known as: NEURONTIN   600 mg, Oral, 3 Times Daily      HumuLIN R U-500 KwikPen 500 UNIT/ML solution pen-injector CONCENTRATED injection  Generic drug: Insulin Regular Human (Conc)   190 Units, Subcutaneous, Every Morning, Take 190 units in am, 180 units in afternoon and 180 units in the evening.      Insulin Regular Human (Conc) 500 UNIT/ML solution pen-injector CONCENTRATED injection  Commonly known as: HumuLIN R   180 Units, Subcutaneous, Daily With Lunch, Take 190 units in am, 180 units in afternoon and 180 units in the evening.      Insulin Regular Human (Conc) 500 UNIT/ML solution pen-injector CONCENTRATED injection  Commonly known as: HumuLIN R   180 Units, Subcutaneous, Daily With Dinner, Take 190 units in am, 180 units in afternoon and 180 units in the evening.      HYDROmorphone 2 MG  tablet  Commonly known as: DILAUDID   2 mg, Oral, Daily PRN      HYDROmorphone 4 MG tablet  Commonly known as: DILAUDID   4 mg, Oral, Daily PRN      lamoTRIgine 100 MG tablet  Commonly known as: LaMICtal   50 mg, Oral, Every Evening      lamoTRIgine 100 MG tablet  Commonly known as: LaMICtal   100 mg, Oral, Every Morning      levETIRAcetam 1000 MG tablet  Commonly known as: KEPPRA   1,000 mg, Oral, 2 Times Daily      levocetirizine 5 MG tablet  Commonly known as: XYZAL   5 mg, Oral, Every Evening      lisinopril 5 MG tablet  Commonly known as: PRINIVIL,ZESTRIL   5 mg, Oral, Every Night at Bedtime      metoprolol succinate  MG 24 hr tablet  Commonly known as: TOPROL-XL   Take 1 tablet by mouth Daily      omeprazole 20 MG capsule  Commonly known as: priLOSEC   20 mg, Oral, Daily      Ozempic (1 MG/DOSE) 2 MG/1.5ML solution pen-injector  Generic drug: Semaglutide (1 MG/DOSE)   1 mg, Subcutaneous, Weekly      potassium chloride 20 MEQ CR tablet  Commonly known as: K-DUR,KLOR-CON   20 mEq, Oral, Daily PRN      Spiriva Respimat 1.25 MCG/ACT aerosol solution inhaler  Generic drug: Tiotropium Bromide Monohydrate   2 puffs, Inhalation, Daily      spironolactone 25 MG tablet  Commonly known as: ALDACTONE   25 mg, Oral, Daily      SUMAtriptan 50 MG tablet  Commonly known as: IMITREX   50 mg, Oral, Every 2 Hours PRN, Take one tablet at onset of headache. May repeat dose one time in 2 hours if headache not relieved.      tiZANidine 4 MG tablet  Commonly known as: ZANAFLEX   8 mg, Oral, Nightly PRN      Trintellix 20 MG tablet  Generic drug: Vortioxetine HBr   20 mg, Oral, Daily      vitamin C 500 MG tablet  Commonly known as: ASCORBIC ACID   500 mg, Oral, Daily      vitamin D3 125 MCG (5000 UT) capsule capsule   5,000 Units, Oral, Daily      vitamin E 400 UNIT capsule   400 Units, Oral, 2 times daily                Follow-up Information     Isaura Godoy MD Follow up in 1 week(s).    Specialties: Internal Medicine,  Geriatric Medicine  Contact information:  [address] [place] [id number]  [phone]                          TEST  RESULTS PENDING AT DISCHARGE       CODE STATUS  Code Status and Medical Interventions:   Ordered at: 06/07/22 1201     Code Status (Patient has no pulse and is not breathing):    CPR (Attempt to Resuscitate)     Medical Interventions (Patient has pulse or is breathing):    Full Support       The ASCVD Risk score (Roychan DE LA ROSA Jr., et al., 2013) failed to calculate for the following reasons:    The patient has a prior MI or stroke diagnosis     Saturnino Beckman MD  [facility]  06/15/22  08:54 EDT    Please note that this discharge summary required more than 30 minutes to complete.      Electronically signed by Saturnino Beckman MD at 06/15/22 0942

## 2022-06-15 NOTE — DISCHARGE SUMMARY
[facility] DISCHARGE SUMMARY    Patient Identification:  Name:  Denzel Harding  Age:  62 y.o.  Sex:  male  :  1960  MRN:  [id number]  Visit Number:  [id number]    Date of Admission: 2022  Date of Discharge:  6/15/2022    PCP: Isaura Godoy MD    DISCHARGE DIAGNOSIS  #Chest pain   #CAD s/p CABG with recent LHC revealing diffusal distal LAD disease and moderate to high grade distal RCA stenosis    #History Postop Pericarditis status post pericardial window   #Chronic HFpEF  #HTN  #DM II  #Hx of CVA  #EDD    CONSULTS   Cardiology     PROCEDURES PERFORMED  Echo :  · Left ventricular ejection fraction appears to be 61 - 65%. Left ventricular systolic function is normal.  · Left ventricular diastolic function is consistent with (grade II w/high LAP) pseudonormalization.  · This is a technically limited study.    Stress test :  · Left ventricular ejection fraction is borderline normal. (Calculated EF = 50%).  · Impressions are consistent with an intermediate risk study.  · There is no prior study available for comparison.  · Mild fixed mid anterior wall defect with significant mid anterior, distal anterior, apical, apical apical septal walls ischemia, TID 1.13.  · Findings consistent with a normal ECG stress test.     Left heart cath :  · Mid LAD to Dist LAD lesion is 75% stenosed beyond th anastomosis of LIMA. There is heavy calcification in this vessel and not a good target for intervention  · Mid LAD lesion is 100% stenosed.  · 1st Diag lesion is 100% stenosed.  · Dist RCA lesion is 60% stenosed in a large non-grafted vessel  · There is a single vein graft to OM with a jump to Diagonal. OM is patent with good flow and Diagonal limb is patent but distal vessel is diffusely diseased and occluded.  · BASURTO to LAD is a good looking graft which is widely patent but beyond anastomosis there is heavy disease.  · No good options. Recommend medical management.  · No good  targets for redo or intervention.  · EBL 30 ml  · Right femoral approach with Mynx         Left heart cath w/ PCI 6/15:  Patient is RCA angiogram demonstrated distal 2 tandem 60 and 80% stenosis prior to the bifurcation, the RPDA and PL branches were diffusely diseased though, I then did an IFR of the distal RCA lesion which came out at 0.7.  Patient received total of 12,000 units of heparin.  Decided to intervene on the distal RCA, used a 3 oh by 20 balloon to do predilatation at 16 anay.  With the help of 6 Arabic guiding her I then delivered a 3.25 x 23 mm Xience lizabeth point drug-eluting stent to the distal RCA posted with the same stent balloon at 18 anay.     Post stent deployment angio pictures showed good expansion with 0% residual stenosis, ESTRELLA 3 flow in the distal vascular bed and no dissection or distal wire perforation.       HOSPITAL COURSE  Patient is a 62 y.o. male presented on 6/7 to [facility] complaining of chest pain and shortness of breath.  Please see the admitting history and physical for further details.      Mr. Harding is our 61 yo M with hx of CAD s/p remote stenting x2 in 2002 followed by CABG x 3V in 2017, COPD, CHF, CVA, hx of tobacco use, morbid obesity, HTN, HLD, EDD, DM II who presented with chest pain. Patient underwent stress test that was positive with anterior defect. LHC revealed diffusal distal LAD disease and moderate to high grade distal RCA stenosis. Cardiology discussed case with [facility] cardiology providers. Ultimately, due to persistent anginal symptoms, cardiology opted to intervene on RCA obstruction, iFR was 0.7. PCI performed to RCA on 6/14. Continue ASA, statin, plavix. Continue metoprolol. Continue imdur and added ranexa. Today patient reports feeling better and agreeable to go home. Denies any concerns. Has appointment with his cardiologist on 7/11. Will have patient f/u with PCP in 1 week.         VITAL SIGNS:  Temp:  [96.5 °F (35.8 °C)-98.7 °F  (37.1 °C)] 98.7 °F (37.1 °C)  Heart Rate:  [68-80] 69  Resp:  [10-18] 10  BP: (103-162)/(55-85) 130/76  Arterial Line BP: (108-143)/(59-69) 137/66  SpO2:  [87 %-100 %] 99 %  on  Flow (L/min):  [2] 2;   Device (Oxygen Therapy): nasal cannula    Body mass index is 44.31 kg/m².  Wt Readings from Last 3 Encounters:   06/15/22 (!) 140 kg (308 lb 12.8 oz)   03/19/22 132 kg (290 lb)   02/10/22 (!) 138 kg (304 lb)       PHYSICAL EXAM:  Constitutional:  Well-developed and well-nourished.  No respiratory distress.      HENT:  Head:  Normocephalic and atraumatic.  Mouth:  Moist mucous membranes.    Eyes:  Conjunctivae and EOM are normal.  Pupils are equal, round, and reactive to light.  No scleral icterus.    Cardiovascular:  Normal rate, regular rhythm and normal heart sounds with no murmur.  Pulmonary/Chest:  No respiratory distress, no wheezes, no crackles, with normal breath sounds and good air movement.  Abdominal:  Soft.  Bowel sounds are normal.  No distension and no tenderness.   Musculoskeletal:  No edema, no tenderness, and no deformity.  No red or swollen joints anywhere.    Neurological:  Alert and oriented to person, place, and time.  No gross neurological deficit.   Skin:  Skin is warm and dry. No rash noted. No pallor.   Peripheral vascular:  Strong pulses in all 4 extremities with no clubbing, no cyanosis, no edema.    DISCHARGE DISPOSITION   Stable    DISCHARGE MEDICATIONS:     Discharge Medications      New Medications      Instructions Start Date   ranolazine 1000 MG 12 hr tablet  Commonly known as: RANEXA   1,000 mg, Oral, Every 12 Hours Scheduled         Changes to Medications      Instructions Start Date   isosorbide mononitrate 120 MG 24 hr tablet  Commonly known as: IMDUR  What changed:   · medication strength  · how much to take  · when to take this   120 mg, Oral, Every 24 Hours Scheduled   Start Date: June 16, 2022        Continue These Medications      Instructions Start Date   albuterol sulfate HFA  108 (90 Base) MCG/ACT inhaler  Commonly known as: Ventolin HFA   2 puffs, Inhalation, Every 4 Hours PRN      aspirin 81 MG EC tablet   81 mg, Oral, Daily      atorvastatin 80 MG tablet  Commonly known as: LIPITOR   80 mg, Oral, Nightly      budesonide-formoterol 80-4.5 MCG/ACT inhaler  Commonly known as: Symbicort   2 puffs, Inhalation, 2 Times Daily - RT, Rinse mouth with water after use.      clopidogrel 75 MG tablet  Commonly known as: PLAVIX   75 mg, Oral, Daily      coenzyme Q10 100 MG capsule   100 mg, Oral, Daily      Dupixent 300 MG/2ML solution pen-injector  Generic drug: Dupilumab   300 mg, Subcutaneous, Every 14 Days      furosemide 40 MG tablet  Commonly known as: LASIX   40 mg, Oral, Daily PRN      gabapentin 600 MG tablet  Commonly known as: NEURONTIN   600 mg, Oral, 3 Times Daily      HumuLIN R U-500 KwikPen 500 UNIT/ML solution pen-injector CONCENTRATED injection  Generic drug: Insulin Regular Human (Conc)   190 Units, Subcutaneous, Every Morning, Take 190 units in am, 180 units in afternoon and 180 units in the evening.      Insulin Regular Human (Conc) 500 UNIT/ML solution pen-injector CONCENTRATED injection  Commonly known as: HumuLIN R   180 Units, Subcutaneous, Daily With Lunch, Take 190 units in am, 180 units in afternoon and 180 units in the evening.      Insulin Regular Human (Conc) 500 UNIT/ML solution pen-injector CONCENTRATED injection  Commonly known as: HumuLIN R   180 Units, Subcutaneous, Daily With Dinner, Take 190 units in am, 180 units in afternoon and 180 units in the evening.      HYDROmorphone 2 MG tablet  Commonly known as: DILAUDID   2 mg, Oral, Daily PRN      HYDROmorphone 4 MG tablet  Commonly known as: DILAUDID   4 mg, Oral, Daily PRN      lamoTRIgine 100 MG tablet  Commonly known as: LaMICtal   50 mg, Oral, Every Evening      lamoTRIgine 100 MG tablet  Commonly known as: LaMICtal   100 mg, Oral, Every Morning      levETIRAcetam 1000 MG tablet  Commonly known as: KEPPRA    1,000 mg, Oral, 2 Times Daily      levocetirizine 5 MG tablet  Commonly known as: XYZAL   5 mg, Oral, Every Evening      lisinopril 5 MG tablet  Commonly known as: PRINIVIL,ZESTRIL   5 mg, Oral, Every Night at Bedtime      metoprolol succinate  MG 24 hr tablet  Commonly known as: TOPROL-XL   Take 1 tablet by mouth Daily      omeprazole 20 MG capsule  Commonly known as: priLOSEC   20 mg, Oral, Daily      Ozempic (1 MG/DOSE) 2 MG/1.5ML solution pen-injector  Generic drug: Semaglutide (1 MG/DOSE)   1 mg, Subcutaneous, Weekly      potassium chloride 20 MEQ CR tablet  Commonly known as: K-DUR,KLOR-CON   20 mEq, Oral, Daily PRN      Spiriva Respimat 1.25 MCG/ACT aerosol solution inhaler  Generic drug: Tiotropium Bromide Monohydrate   2 puffs, Inhalation, Daily      spironolactone 25 MG tablet  Commonly known as: ALDACTONE   25 mg, Oral, Daily      SUMAtriptan 50 MG tablet  Commonly known as: IMITREX   50 mg, Oral, Every 2 Hours PRN, Take one tablet at onset of headache. May repeat dose one time in 2 hours if headache not relieved.      tiZANidine 4 MG tablet  Commonly known as: ZANAFLEX   8 mg, Oral, Nightly PRN      Trintellix 20 MG tablet  Generic drug: Vortioxetine HBr   20 mg, Oral, Daily      vitamin C 500 MG tablet  Commonly known as: ASCORBIC ACID   500 mg, Oral, Daily      vitamin D3 125 MCG (5000 UT) capsule capsule   5,000 Units, Oral, Daily      vitamin E 400 UNIT capsule   400 Units, Oral, 2 times daily                Follow-up Information     Isaura Godoy MD Follow up in 1 week(s).    Specialties: Internal Medicine, Geriatric Medicine  Contact information:  [address] [place] [address] [phone]                          TEST  RESULTS PENDING AT DISCHARGE       CODE STATUS  Code Status and Medical Interventions:   Ordered at: 06/07/22 1201     Code Status (Patient has no pulse and is not breathing):    CPR (Attempt to Resuscitate)     Medical Interventions (Patient has pulse or is  breathing):    Full Support       The ASCVD Risk score (Eitan DE LA ROSA Jr., et al., 2013) failed to calculate for the following reasons:    The patient has a prior MI or stroke diagnosis     Saturnino Beckman MD  [facility]  06/15/22  08:54 EDT    Please note that this discharge summary required more than 30 minutes to complete.

## 2022-06-16 NOTE — OUTREACH NOTE
Call Center TCM Note    Flowsheet Row Responses   [facility] facility patient discharged from? [facility]   Does the patient have one of the following disease processes/diagnoses(primary or secondary)? Other   TCM attempt successful? No   Unsuccessful attempts Attempt 1          Charito Christopher RN    6/16/2022, 12:47 EDT

## 2022-06-16 NOTE — OUTREACH NOTE
Call Center TCM Note    Flowsheet Row Responses   [facility] patient discharged from? [facility]   Does the patient have one of the following disease processes/diagnoses(primary or secondary)? Other   TCM attempt successful? Yes   Call start time 1523   Call end time 1525   Discharge diagnosis Chest pain-left heart cath this visit   Person spoke with today (if not patient) and relationship Wife    Meds reviewed with patient/caregiver? Yes   Is the patient having any side effects they believe may be caused by any medication additions or changes? No   Does the patient have all medications ordered at discharge? Yes   Is the patient taking all medications as directed (includes completed medication regime)? Yes   Does the patient have a primary care provider?  Yes   Does the patient have an appointment with their PCP within 7 days of discharge? Greater than 7 days   Comments regarding PCP HOSP DC FU appt 6/27/22 @ 10:15 am   What is preventing the patient from scheduling follow up appointments within 7 days of discharge? Earlier appointment not available   Nursing Interventions Verified appointment date/time/provider   Has home health visited the patient within 72 hours of discharge? N/A   Psychosocial issues? No   Did the patient receive a copy of their discharge instructions? Yes   Nursing interventions Reviewed instructions with patient   What is the patient's perception of their health status since discharge? Improving   Is the patient/caregiver able to teach back signs and symptoms related to disease process for when to call PCP? Yes   Is the patient/caregiver able to teach back signs and symptoms related to disease process for when to call 911? Yes   Is the patient/caregiver able to teach back the hierarchy of who to call/visit for symptoms/problems? PCP, Specialist, Home health nurse, Urgent Care, ED, 911 Yes   TCM call completed? Yes   Wrap up additional comments Wife reports Pt is doing well at this time.  Reviewed s/s of infection to monitor with cath site. WIfe reports no issues at this time.           Charito Christopher RN    6/16/2022, 15:26 EDT

## 2022-06-21 PROBLEM — J44.9 COPD (CHRONIC OBSTRUCTIVE PULMONARY DISEASE) (HCC): Status: RESOLVED | Noted: 2019-09-14 | Resolved: 2022-01-01

## 2022-06-21 PROBLEM — J96.21 ACUTE ON CHRONIC RESPIRATORY FAILURE WITH HYPOXIA (HCC): Status: RESOLVED | Noted: 2021-08-09 | Resolved: 2022-01-01

## 2022-06-21 PROBLEM — J44.1 COPD WITH ACUTE EXACERBATION: Status: RESOLVED | Noted: 2021-08-09 | Resolved: 2022-01-01

## 2022-06-21 NOTE — PROGRESS NOTES
"Chief Complaint   Patient presents with   • Follow-up   • Sleeping Problem   • Shortness of Breath         Subjective   Denzel Harding is a 62 y.o. male.     History of Present Illness   The patient comes in today for follow-up of obstructive sleep apnea as well as severe asthma    Patient says that since the last office visit he has had no recent exacerbations. he denies any emergency room visits or hospitalizations, due to his asthma.     The patient says that he is compliant with pulmonary medicines, as prescribed. He is using Symbicort, Spiriva as directed. He uses albuterol 1-2 times a weeks.     He uses duonebs when he is congested or feels he is getting sick.     He is taking Dupixent injections every 2 weeks. It ships to his home.     He had a mild heart attack last week.     Patient says that he is compliant with his device and using it regularly.    Patient's symptoms of sleep disturbance and daytime sleepiness have been helped greatly with the use of PAP device, as prescribed.     He states he has black stuff coming out of his machine. He has not been able to get an inline filter.       The following portions of the patient's history were reviewed and updated as appropriate: allergies, current medications, past family history, past medical history, past social history and past surgical history.      Review of Systems   HENT: Positive for sinus pressure.    Respiratory: Positive for shortness of breath.    Cardiovascular: Negative for palpitations.   Psychiatric/Behavioral: Positive for sleep disturbance.       Objective   Visit Vitals  /78   Pulse 85   Resp 18   Ht 182.9 cm (72\")   Wt (!) 142 kg (312 lb)   SpO2 96%   BMI 42.31 kg/m²     ============================  ============================    6 MINUTE WALK TEST    Date of test: 06/21/22    Denzel Harding   1960             BASELINE   SpO2%: 96% RA    Heart Rate 85   Blood Pressure 118/78     EXERCISE SpO2% HEART RATE RA or O2 @ LPM   1 " MINUTE 96 85 RA   2 MINUTES 95 116 RA   3 MINUTES 0     4 MINUTES 0     5 MINUTES 0     6 MINUTES 0     (Number of laps: 2 X 36 meters + Final partial lap:  meters = 72 meters)            Distance Walked:  72 Meters   SpO2% Post Exercise:  98 %   HR Post Exercise:  80     Reason to stop (if applicable):   ____ Chest Pain   __X_ Light Headedness   ____ Dyspnea Unrelieved by Rest   ____ Abnormal Gait Pattern   ____ Severe Fatigue   ____ Other (Specify: __________________________)    Tech Comments (if any): patient was very SOB with Dizziness, and knee pain    Test performed by: JOSÉ MIGUEL    ============================  ============================      Physical Exam  Vitals reviewed.   HENT:      Head: Atraumatic.      Mouth/Throat:      Mouth: Mucous membranes are moist.      Comments: Crowded oropharynx. Edentulous.  Eyes:      Extraocular Movements: Extraocular movements intact.   Neck:      Comments: Increased adipose tissue.  Cardiovascular:      Rate and Rhythm: Normal rate and regular rhythm.   Pulmonary:      Effort: Pulmonary effort is normal. No respiratory distress.      Comments: Somewhat decreased A/E without wheezing.   Abdominal:      Comments: Obese abdomen.   Musculoskeletal:      Comments: Gait slow.   Skin:     General: Skin is warm.   Neurological:      Mental Status: He is alert and oriented to person, place, and time.           Assessment & Plan   Diagnoses and all orders for this visit:    1. Severe persistent asthma without complication (Primary)    2. Other allergic rhinitis    3. Morbid obesity, unspecified obesity type (HCC)    4. EDD (obstructive sleep apnea)    5. SOB (shortness of breath)  -     Converted Six Minute Walk  -     Converted Six Minute Walk; Future    Other orders  -     Tiotropium Bromide Monohydrate (Spiriva Respimat) 1.25 MCG/ACT aerosol solution inhaler; Inhale 2 puffs Daily.  Dispense: 12 g; Refill: 1  -     budesonide-formoterol (Symbicort) 80-4.5 MCG/ACT inhaler; Inhale 2  puffs 2 (Two) Times a Day. Rinse mouth with water after use.  Dispense: 30.6 g; Refill: 1  -     albuterol sulfate HFA (Ventolin HFA) 108 (90 Base) MCG/ACT inhaler; Inhale 2 puffs Every 4 (Four) Hours As Needed for Wheezing or Shortness of Air.  Dispense: 8 g; Refill: 5  -     zafirlukast (ACCOLATE) 20 MG tablet; Take 1 tablet by mouth 2 (Two) Times a Day.  Dispense: 180 tablet; Refill: 1  -     ipratropium-albuterol (DUO-NEB) 0.5-2.5 mg/3 ml nebulizer; Take 3 mL by nebulization 4 (Four) Times a Day As Needed for Wheezing or Shortness of Air.  Dispense: 360 mL; Refill: 1           Return for keep appt in December, 6 MWT on f/u.    DISCUSSION (if any):  On 6-minute walk,  he did not show exercise hypoxemia however after walking only 72 m he experienced chest tightness, dizziness and knee pain and stopped the test.    Given his recent hospitalization mild heart attack, I have asked him to continue using especially with activity and at night.    He may take the oxygen off when at rest during the day since his oxygen saturation was greater than 92% at rest on room air.    I have ordered 6-minute walk for his follow-up visit.    His symptoms of asthma are under adequate control at this time. Dupixent has helped greatly in improving his asthma symptoms and decreased exacerbations.     He should continue Symbicort, Spiriva, accolate, and rescue medications as directed.    Patient's medications for underlying asthma were reviewed with him in great detail.    Any needed adjustments to his pulmonary medications, either for clinical or insurance coverage reasons, have been made and are reflected in the orders.    Side effects of prescribed medications discussed with the patient.    Asthma action plan with discussed with him.    The patient was asked to call this office if the symptoms worsen.    Continue treatment with BiPAP at a pressure of 13/5, with a full-face mask.    Patient's compliance data was reviewed and the  compliance is greater than 80%.    Humidification setup, hose and mask care discussed.    Weight loss advised.    Use every night for at least 4 hours stressed.    I have told the patient that he can purchase an inline filter from [employer] since the "[employer]" company is not supplying these filters.     His PAP machine has been recalled and he has registered the machine but he has not heard any news about a replacement.     He states he cannot sleep without the machine and is afraid he will die in his sleep without the machine so I have strongly encouraged him to order an inline filter for use until he receives a new machine.    Dictated utilizing Dragon dictation.    This document was electronically signed by ANTONIO Ramírez June 21, 2022  11:23 EDT

## 2022-07-06 NOTE — PROGRESS NOTES
Transitional Care Follow Up Visit  Subjective     Denzel Harding is a 62 y.o. male who presents for a transitional care management visit.    Within 48 business hours after discharge our office contacted him via telephone to coordinate his care and needs.      I reviewed and discussed the details of that call along with the discharge summary, hospital problems, inpatient lab results, inpatient diagnostic studies, and consultation reports with Denzel.     Current outpatient and discharge medications have been reconciled for the patient.  Reviewed by: Isaura Godoy MD      Date of TCM Phone Call 6/15/2022   [facility]   Date of Admission 6/7/2022   Date of Discharge 6/15/2022   Discharge Disposition Home or Self Care     Risk for Readmission (LACE) No data recorded    Chief Complaint   Patient presents with   • Hospital Follow Up Visit     [facility] admit 6/27/2022 discharged 7/3/2022       History of Present Illness   Course During Hospital Stay:    Patient is here to follow up on the blood pressure  The patient is taking the blood pressure medications as prescribed and has had no side effects. The patient is also here to follow up on admission  at [facility]  Neurology , admitted on 6- and discharged on 7-3-2022  At  [facility]   And goes back to see neurology dr haywood on 7-  , he was admitted prior to that at [facility] For chest  Pain , He had a MI - they did balloon angioplasty with ptca and a stent placement , he has a follow up with cardiology at [facility] .  Patient is also to follow-up on his sugar and he sees [facility] endocrinology and he states his last hemoglobin A1c was at 6.7, he is also to follow-up on cholesterol and needs refills on Lipitor  Hypertension   Pertinent negatives include no chest pain, palpitations or shortness of breath.      The following portions of the patient's history were reviewed and updated as appropriate: allergies, current medications, past family history, past  medical history, past social history, past surgical history and problem list.    Review of Systems   All other systems reviewed and are negative.      Objective   Physical Exam  Vitals and nursing note reviewed.   Constitutional:       General: He is not in acute distress.     Appearance: Normal appearance. He is not diaphoretic.   HENT:      Head: Normocephalic and atraumatic.      Right Ear: External ear normal.      Left Ear: External ear normal.      Nose: Nose normal.   Eyes:      Extraocular Movements: Extraocular movements intact.      Conjunctiva/sclera: Conjunctivae normal.   Neck:      Trachea: Trachea normal.   Cardiovascular:      Rate and Rhythm: Normal rate and regular rhythm.      Heart sounds: Normal heart sounds.   Pulmonary:      Effort: Pulmonary effort is normal. No respiratory distress.   Abdominal:      General: Abdomen is flat.   Musculoskeletal:      Cervical back: Neck supple.      Comments: In wheelchair   Skin:     General: Skin is warm and dry.      Findings: No erythema.   Neurological:      Mental Status: He is alert and oriented to person, place, and time.      Comments: No gross motor or sensory deficits     data reviewed:  Procedure with Joel Turpin MD (06/14/2022)  Procedure with Otto Valencia MD (06/09/2022)      Assessment & Plan   Diagnoses and all orders for this visit:    1. Benign essential hypertension (Primary)  -     metoprolol succinate XL (TOPROL-XL) 200 MG 24 hr tablet; Take 1 tablet by mouth Daily  Dispense: 90 tablet; Refill: 1    2. Mixed hyperlipidemia  -     atorvastatin (LIPITOR) 80 MG tablet; Take 1 tablet by mouth Every Night.  Dispense: 90 tablet; Refill: 1    3. Coronary artery disease involving native coronary artery of native heart without angina pectoris  -     clopidogrel (PLAVIX) 75 MG tablet; Take 1 tablet by mouth Daily.  Dispense: 90 tablet; Refill: 1    4. Type 2 diabetes mellitus with hyperglycemia, with long-term current use of  insulin (HCC)    Plan:  1.  Benign essential hypertension: Will continue current medication, low-sodium diet advised, Counseled to regularly check BP at home with goal averaging <130/80.   2.mixed hyperlipidemia:   Reviewed fasting CMP and lipid panel.  Diet and exercise counseled,  Will continue current medications  3. Diabetes  Mellitus  :   Reviewed fasting CMP  and hba1c  , diet and exercise counseled , Will continue current medications, per [facility] endocrinology  4.  Coronary artery disease: To continue current medications per cardiology, refill medications today

## 2022-07-11 NOTE — PROGRESS NOTES
[facility] at [facility]  Office visit  Denzel Harding  1960  [phone]    VISIT DATE:  7/11/2022      PCP: Isaura Godoy MD  [address] [place] [id number]    CC:  Chief Complaint   Patient presents with   • Congestive Heart Failure       PROBLEM LIST:  1. Post op pericarditis with effusion s/p left anterior thoracotomy and pericardial window 8/25/17  2. CABG x 3, LAD endarterectomy 8/15/17 Dr. Damon, Normal EF, DHF  3. HTN: Controlled, BB and Norvasc  4. CVA: Remote  5. DM  6. COPD/EDD  7. HLD    Previous cardiac studies and procedures:  August 2017 cardiac catheterization  · Severe three-vessel coronary artery disease  · Preserved global and regional left ventricular systolic function  · Elevated left ventricular filling pressures consistent with diastolic heart failure.  · No significant left-sided valvular abnormalities appreciated    December 2017  Echo  · Left ventricular systolic function is normal.  · Left ventricular diastolic dysfunction (grade I a) consistent with impaired relaxation.  · Left ventricular wall thickness is consistent with mild concentric hypertrophy.  Carotid duplex  · Proximal right internal carotid artery plaque without significant stenosis.  · Right internal carotid artery stenosis of 0-49%.  · Proximal left internal carotid artery plaque without significant stenosis.  · Left internal carotid artery stenosis of 0-49%.    December 2018 myocardial perfusion imaging  · Left ventricular ejection fraction is borderline normal (Calculated EF = 50%).  · Myocardial perfusion imaging indicates a normal myocardial perfusion study with no evidence of ischemia.    6/2019 echo  Technically very limited study   1) Mild LVH with normal LV systolic function ( EF is over 55%)  2) Mild left atrial enlargement   3) Trace MR and TR   4) Normal RV function     January 2021  Transthoracic echo  1.  Technically difficult study.  2.  Normal left ventricular size and  systolic function, LVEF grossly 55-60%.  3.  Normal LV diastolic filling pattern.  4.  Normal right ventricular size and systolic function.  5.  Severely increased left atrial volume index.  6.  Trace mitral regurgitation.  Kayy scan myocardial perfusion imaging  · Left ventricular ejection fraction is normal. (Calculated EF = 57%).  · Myocardial perfusion imaging indicates a normal myocardial perfusion study with no evidence of ischemia.    March 2021  TTE  1.  Technically difficult study.   2.  Normal left ventricular size and systolic function, LVEF 55-60%.  3.  Indeterminate LV diastolic filling pattern.  4.  Normal right ventricular size and systolic function.  5.  Moderately increased left atrial volume index.  6.  Mild calcification of the aortic valve without significant stenosis.  7.  Mild concentric LVH.  HAMLET  · Estimated left ventricular EF = 60% Left ventricular ejection fraction appears to be 56 - 60%. Left ventricular systolic function is normal.  · Saline test results are negative.  · The right atrial cavity is small in size.  · There is moderate calcification of the aortic valve mainly affecting the non-coronary cusp(s).    June 2022  TTE  · Left ventricular ejection fraction appears to be 61 - 65%. Left ventricular systolic function is normal.  · Left ventricular diastolic function is consistent with (grade II w/high LAP) pseudonormalization.  · This is a technically limited study.  Myocardial perfusion imaging  · Left ventricular ejection fraction is borderline normal. (Calculated EF = 50%).  · Impressions are consistent with an intermediate risk study.  · There is no prior study available for comparison.  · Mild fixed mid anterior wall defect with significant mid anterior, distal anterior, apical, apical apical septal walls ischemia, TID 1.13.  · Findings consistent with a normal ECG stress test.  Cardiac catheterization tonight  · Mid LAD to Dist LAD lesion is 75% stenosed beyond th anastomosis  of LIMA. There is heavy calcification in this vessel and not a good target for intervention  · Mid LAD lesion is 100% stenosed.  · 1st Diag lesion is 100% stenosed.  · Dist RCA lesion is 60% stenosed in a large non-grafted vessel  · There is a single vein graft to OM with a jump to Diagonal. OM is patent with good flow and Diagonal limb is patent but distal vessel is diffusely diseased and occluded.  · BASURTO to LAD is a good looking graft which is widely patent but beyond anastomosis there is heavy disease.  · No good options. Recommend medical management.  · No good targets for redo or intervention.  Cardiac catheterization June 14  1. iFR of RCA - 0.7   2. PCI of RCA with 3.25 x 23 mm LELE          ASSESSMENT:   Diagnosis Plan   1. Coronary arteriosclerosis in native artery     2. Chronic diastolic CHF (congestive heart failure) (HCC)     3. Essential hypertension     4. Hypercholesterolemia         PLAN:  Coronary artery disease: Continue dual antiplatelet therapy.  Continue isosorbide mononitrate 120 mg p.o. daily.  Down titrating Ranexa, starting 500 mg p.o. twice daily and then discontinuing after 1 month if asymptomatic.    History of postop pericarditis status post pericardial window    Hypertension: goal less than 130/80.  Continue current medical therapy.    Hyperlipidemia: Continue high intensity statin therapy, goal LDL less than 70.    Congestive heart failure, chronic, diastolic: Currently euvolemic and compensated.  Continue current medical therapy.      Subjective  Using wheeled walker for ambulation.  Recently presented [facility] with accelerating angina.  Troponin negative.  Underwent successful percutaneous revascularization of obstructive RCA disease.  Imdur titrated from 60 mg p.o. daily to 120 mg p.o. daily and he was started on Ranexa 1000 mg p.o. twice daily.  QT prolongation was noted on recent twelve-lead EKGs.    PHYSICAL EXAMINATION:  Vitals:    07/11/22 1048   BP: 140/74   BP Location: Left  "arm   Patient Position: Sitting   Pulse: 87   SpO2: 95%   Weight: (!) 142 kg (313 lb)   Height: 177.8 cm (70\")     General Appearance:    Alert, cooperative, no distress, appears stated age   Head:    Normocephalic, without obvious abnormality, atraumatic   Eyes:    conjunctiva/corneas clear   Nose:   Nares normal, septum midline, mucosa normal, no drainage   Throat:   Lips, teeth and gums normal   Neck:   Supple, symmetrical, trachea midline, no carotid    bruit or JVD   Lungs:    Diminished at the bases bilaterally, respirations unlabored   Chest Wall:    No tenderness or deformity    Heart:    Regular rate and rhythm, S1 and S2 normal, no murmur, rub   or gallop, normal carotid impulse bilaterally without bruit.   Abdomen:     Soft, non-tender   Extremities:   Extremities normal, atraumatic, no cyanosis, trivial bilateral pretibial edema   Pulses:   2+ and symmetric all extremities   Skin:   Skin color, texture, turgor normal, no rashes or lesions       Diagnostic Data:  Procedures  Lab Results   Component Value Date    CHLPL 123 07/01/2021    TRIG 111 06/14/2022    HDL 30 (L) 06/14/2022     Lab Results   Component Value Date    GLUCOSE 123 (H) 06/14/2022    BUN 15 06/14/2022    CREATININE 0.81 06/14/2022     06/14/2022    K 4.3 06/14/2022     06/14/2022    CO2 25.3 06/14/2022     Lab Results   Component Value Date    HGBA1C 6.80 (H) 06/08/2022     Lab Results   Component Value Date    WBC 10.12 06/14/2022    HGB 12.3 (L) 06/14/2022    HCT 38.2 06/14/2022     06/14/2022       Allergies  Allergies   Allergen Reactions   • Ajovy [Fremanezumab-Vfrm] Other (See Comments)     Caused tightness of chest, vomiting, pain in both arms.   • Sulfa Antibiotics Anaphylaxis, Nausea And Vomiting and Delirium   • Invokana [Canagliflozin] Unknown - Low Severity     DKA   • Codeine Nausea And Vomiting     Can only take with Phenergan   • Morphine Unknown - Low Severity     Does not work. It causes pain "       Current Medications    Current Outpatient Medications:   •  albuterol sulfate HFA (Ventolin HFA) 108 (90 Base) MCG/ACT inhaler, Inhale 2 puffs Every 4 (Four) Hours As Needed for Wheezing or Shortness of Air., Disp: 8 g, Rfl: 5  •  aspirin 81 MG EC tablet, Take 81 mg by mouth Daily., Disp: , Rfl:   •  atorvastatin (LIPITOR) 80 MG tablet, Take 1 tablet by mouth Every Night., Disp: 90 tablet, Rfl: 1  •  budesonide-formoterol (Symbicort) 80-4.5 MCG/ACT inhaler, Inhale 2 puffs 2 (Two) Times a Day. Rinse mouth with water after use., Disp: 30.6 g, Rfl: 1  •  clopidogrel (PLAVIX) 75 MG tablet, Take 1 tablet by mouth Daily., Disp: 90 tablet, Rfl: 1  •  coenzyme Q10 100 MG capsule, Take 100 mg by mouth Daily., Disp: , Rfl:   •  Dupilumab (Dupixent) 300 MG/2ML solution pen-injector, Inject 300 mg under the skin into the appropriate area as directed Every 14 (Fourteen) Days., Disp: , Rfl:   •  furosemide (LASIX) 40 MG tablet, Take 1 tablet by mouth Daily As Needed (edema)., Disp: 30 tablet, Rfl: 5  •  gabapentin (NEURONTIN) 600 MG tablet, Take 1 tablet by mouth 3 (Three) Times a Day., Disp: 270 tablet, Rfl: 3  •  HYDROmorphone (DILAUDID) 4 MG tablet, Take 4 mg by mouth Daily As Needed (migraine)., Disp: , Rfl:   •  Insulin Regular Human, Conc, (HumuLIN R U-500 KwikPen) 500 UNIT/ML solution pen-injector CONCENTRATED injection, Inject 190 Units under the skin into the appropriate area as directed Every Morning. Take 190 units in am, 180 units in afternoon and 180 units in the evening., Disp: , Rfl:   •  Insulin Regular Human, Conc, (HumuLIN R) 500 UNIT/ML solution pen-injector CONCENTRATED injection, Inject 180 Units under the skin into the appropriate area as directed Daily With Lunch. Take 190 units in am, 180 units in afternoon and 180 units in the evening., Disp: , Rfl:   •  ipratropium-albuterol (DUO-NEB) 0.5-2.5 mg/3 ml nebulizer, Take 3 mL by nebulization 4 (Four) Times a Day As Needed for Wheezing or Shortness of  Air., Disp: 360 mL, Rfl: 1  •  isosorbide mononitrate (IMDUR) 120 MG 24 hr tablet, Take 1 tablet by mouth Daily for 30 days., Disp: 90 tablet, Rfl: 3  •  lamoTRIgine (LaMICtal) 100 MG tablet, Take 100 mg by mouth Every Morning., Disp: , Rfl:   •  lamoTRIgine (LaMICtal) 100 MG tablet, Take 50 mg by mouth Every Evening., Disp: , Rfl:   •  levocetirizine (XYZAL) 5 MG tablet, Take 1 tablet by mouth Every Evening., Disp: 90 tablet, Rfl: 3  •  lisinopril (PRINIVIL,ZESTRIL) 5 MG tablet, Take 5 mg by mouth every night at bedtime., Disp: , Rfl:   •  metoprolol succinate XL (TOPROL-XL) 200 MG 24 hr tablet, Take 1 tablet by mouth Daily, Disp: 90 tablet, Rfl: 1  •  pantoprazole (PROTONIX) 40 MG EC tablet, Take 1 tablet by mouth Every Morning., Disp: 30 tablet, Rfl: 0  •  potassium chloride (K-DUR,KLOR-CON) 20 MEQ CR tablet, Take 1 tablet by mouth Daily As Needed (take with lasix)., Disp: 30 tablet, Rfl: 5  •  Semaglutide, 1 MG/DOSE, (Ozempic, 1 MG/DOSE,) 2 MG/1.5ML solution pen-injector, Inject 1 mg under the skin into the appropriate area as directed 1 (One) Time Per Week., Disp: 9 mL, Rfl: 3  •  spironolactone (ALDACTONE) 25 MG tablet, Take 1 tablet by mouth Daily., Disp: 90 tablet, Rfl: 1  •  Tiotropium Bromide Monohydrate (Spiriva Respimat) 1.25 MCG/ACT aerosol solution inhaler, Inhale 2 puffs Daily., Disp: 12 g, Rfl: 1  •  tiZANidine (ZANAFLEX) 4 MG tablet, Take 8 mg by mouth At Night As Needed for Muscle Spasms., Disp: , Rfl:   •  vitamin C (ASCORBIC ACID) 500 MG tablet, Take 500 mg by mouth Daily., Disp: , Rfl:   •  vitamin D3 125 MCG (5000 UT) capsule capsule, Take 5,000 Units by mouth Daily., Disp: , Rfl:   •  vitamin E 400 UNIT capsule, Take 1 capsule by mouth 2 (two) times a day., Disp: 60 capsule, Rfl: 5  •  Vortioxetine HBr (Trintellix) 20 MG tablet, Take 20 mg by mouth Daily., Disp: , Rfl:   •  zafirlukast (ACCOLATE) 20 MG tablet, Take 1 tablet by mouth 2 (Two) Times a Day., Disp: 180 tablet, Rfl: 1  •   levETIRAcetam (KEPPRA) 1000 MG tablet, Take 1,000 mg by mouth 2 (Two) Times a Day., Disp: , Rfl:   •  ranolazine (Ranexa) 500 MG 12 hr tablet, Take 1 tablet by mouth 2 (Two) Times a Day., Disp: 60 tablet, Rfl: 0          ROS  Review of Systems   Cardiovascular: Positive for dyspnea on exertion, irregular heartbeat and leg swelling. Negative for chest pain and palpitations.   Respiratory: Positive for shortness of breath, sleep disturbances due to breathing, snoring, sputum production and wheezing. Negative for cough.    Neurological: Positive for dizziness.       SOCIAL HX  Social History     Socioeconomic History   • Marital status:    • Number of children: 1   Tobacco Use   • Smoking status: Former Smoker     Packs/day: 1.00     Years: 10.00     Pack years: 10.00     Types: Cigarettes     Quit date: 1998     Years since quittin.5   • Smokeless tobacco: Former User     Types: Snuff     Quit date:    Vaping Use   • Vaping Use: Never used   Substance and Sexual Activity   • Alcohol use: Yes     Comment: 3 drinks a year   • Drug use: No   • Sexual activity: Defer       FAMILY HX  Family History   Problem Relation Age of Onset   • Hypertension Mother    • Arthritis Mother    • Stomach cancer Mother    • Alcohol abuse Father    • Heart disease Other    • Hypertension Other    • Other Other         Neurologic disorder   • Heart attack Other    • Parkinsonism Other    • Stroke Other    • Heart disease Other    • Hypertension Other    • Heart disease Other    • Stroke Other    • Hypertension Other    • Colon cancer Neg Hx              London Jiménez III, MD, [facility]

## 2022-08-01 NOTE — PROGRESS NOTES
Spoke with patient about Cardiac Rehab . He stated there is no way he can afford to come with gas prices as high as they are. He stated if it goes down he will give us a call because he would like to try it.  I explained the benefits and what the program entailed

## 2022-08-09 NOTE — TELEPHONE ENCOUNTER
Rx Refill Note  Requested Prescriptions     Pending Prescriptions Disp Refills   • spironolactone (ALDACTONE) 25 MG tablet [Pharmacy Med Name: SPIRONOLACTONE 25MG TABLETS] 90 tablet 1     Sig: TAKE 1 TABLT BY MOUTH ONCE DAILY      Last office visit with prescribing clinician: 7/6/2022      Next office visit with prescribing clinician: 10/18/2022            Surinder Gama MA  08/09/22, 13:25 EDT

## 2022-09-09 NOTE — NUR
PATIENT COMPLAINING OF CHEST PAIN . INFORMED DR HYMAN , HE SAID TO GIVE THE
PATIENT A NITRO AND CARDIOLOGY IS GOING TO COME SEE HIM. PATIENT VITALS WERE
NORMAL AND NO OTHER DISTRESS NOTED.

## 2022-09-11 NOTE — OUTREACH NOTE
Prep Survey    Flowsheet Row Responses   [facility] facility patient discharged from? Non-BH   Is LACE score < 7 ? Non-BH Discharge   Emergency Room discharge w/ pulse ox? No   Eligibility TCM [facility]    Date of Discharge 09/10/22   Discharge Disposition Home or Self Care   Discharge diagnosis Unknown   Does the patient have one of the following disease processes/diagnoses(primary or secondary)? Other   Prep survey completed? Yes          RAVI EDWARDS - Registered Nurse

## 2022-09-12 NOTE — OUTREACH NOTE
Call Center TCM Note    Flowsheet Row Responses   [facility] patient discharged from? Non-BH   Does the patient have one of the following disease processes/diagnoses(primary or secondary)? Other   TCM attempt successful? No   Unsuccessful attempts Attempt 2          Joana Gomez RN    9/12/2022, 16:09 EDT

## 2022-09-12 NOTE — OUTREACH NOTE
Call Center TCM Note    Flowsheet Row Responses   [facility] patient discharged from? Non-BH   Does the patient have one of the following disease processes/diagnoses(primary or secondary)? Other   TCM attempt successful? No   Unsuccessful attempts Attempt 1          Joana Gomez RN    9/12/2022, 10:41 EDT

## 2022-09-13 NOTE — OUTREACH NOTE
Call Center TCM Note    Flowsheet Row Responses   [facility] patient discharged from? Non-   Does the patient have one of the following disease processes/diagnoses(primary or secondary)? Other   TCM attempt successful? Yes   Call start time 1434   Call end time 1437   Discharge diagnosis Unknown   Meds reviewed with patient/caregiver? Yes   Is the patient having any side effects they believe may be caused by any medication additions or changes? No   Does the patient have all medications ordered at discharge? Yes   Is the patient taking all medications as directed (includes completed medication regime)? Yes   Has home health visited the patient within 72 hours of discharge? N/A   Psychosocial issues? No   Did the patient receive a copy of their discharge instructions? Yes   Nursing interventions Reviewed instructions with patient   What is the patient's perception of their health status since discharge? Improving   Is the patient/caregiver able to teach back the hierarchy of who to call/visit for symptoms/problems? PCP, Specialist, Home health nurse, Urgent Care, ED, 911 Yes   If the patient is a current smoker, are they able to teach back resources for cessation? Not a smoker   TCM call completed? Yes          Lolly Castañeda RN    9/13/2022, 14:37 EDT

## 2022-10-14 NOTE — TELEPHONE ENCOUNTER
Requesting refill on Ranexa 500 mg twice a day. Also requesting f/u appt after being at [facility] for a few days last month for chest pain. Had a stress test and Echo. Requested records. Ok to refill?

## 2022-10-18 NOTE — PROGRESS NOTES
[facility] at [facility]  Office visit  Denzel Harding  1960  [phone]    VISIT DATE:  10/18/2022      PCP: Lynnette Perez, PA  [address] [place], [place] [id number]    CC:  Chief Complaint   Patient presents with   • Chronic diastolic CHF (congestive heart failure)   • Chest Pain   • Dizziness          • Edema       PROBLEM LIST:  1. Post op pericarditis with effusion s/p left anterior thoracotomy and pericardial window 8/25/17  2. CABG x 3, LAD endarterectomy 8/15/17 Dr. Damon, Normal EF, DHF  3. HTN: Controlled, BB and Norvasc  4. CVA: Remote  5. DM  6. COPD/EDD  7. HLD    Previous cardiac studies and procedures:  August 2017 cardiac catheterization  · Severe three-vessel coronary artery disease  · Preserved global and regional left ventricular systolic function  · Elevated left ventricular filling pressures consistent with diastolic heart failure.  · No significant left-sided valvular abnormalities appreciated    December 2017  Echo  · Left ventricular systolic function is normal.  · Left ventricular diastolic dysfunction (grade I a) consistent with impaired relaxation.  · Left ventricular wall thickness is consistent with mild concentric hypertrophy.  Carotid duplex  · Proximal right internal carotid artery plaque without significant stenosis.  · Right internal carotid artery stenosis of 0-49%.  · Proximal left internal carotid artery plaque without significant stenosis.  · Left internal carotid artery stenosis of 0-49%.    December 2018 myocardial perfusion imaging  · Left ventricular ejection fraction is borderline normal (Calculated EF = 50%).  · Myocardial perfusion imaging indicates a normal myocardial perfusion study with no evidence of ischemia.    6/2019 echo  Technically very limited study   1) Mild LVH with normal LV systolic function ( EF is over 55%)  2) Mild left atrial enlargement   3) Trace MR and TR   4) Normal RV function     January 2021  Transthoracic echo  1.   Technically difficult study.  2.  Normal left ventricular size and systolic function, LVEF grossly 55-60%.  3.  Normal LV diastolic filling pattern.  4.  Normal right ventricular size and systolic function.  5.  Severely increased left atrial volume index.  6.  Trace mitral regurgitation.  Kayy scan myocardial perfusion imaging  · Left ventricular ejection fraction is normal. (Calculated EF = 57%).  · Myocardial perfusion imaging indicates a normal myocardial perfusion study with no evidence of ischemia.    March 2021  TTE  1.  Technically difficult study.   2.  Normal left ventricular size and systolic function, LVEF 55-60%.  3.  Indeterminate LV diastolic filling pattern.  4.  Normal right ventricular size and systolic function.  5.  Moderately increased left atrial volume index.  6.  Mild calcification of the aortic valve without significant stenosis.  7.  Mild concentric LVH.  HAMLET  · Estimated left ventricular EF = 60% Left ventricular ejection fraction appears to be 56 - 60%. Left ventricular systolic function is normal.  · Saline test results are negative.  · The right atrial cavity is small in size.  · There is moderate calcification of the aortic valve mainly affecting the non-coronary cusp(s).    June 2022  TTE  · Left ventricular ejection fraction appears to be 61 - 65%. Left ventricular systolic function is normal.  · Left ventricular diastolic function is consistent with (grade II w/high LAP) pseudonormalization.  · This is a technically limited study.  Myocardial perfusion imaging  · Left ventricular ejection fraction is borderline normal. (Calculated EF = 50%).  · Impressions are consistent with an intermediate risk study.  · There is no prior study available for comparison.  · Mild fixed mid anterior wall defect with significant mid anterior, distal anterior, apical, apical apical septal walls ischemia, TID 1.13.  · Findings consistent with a normal ECG stress test.  Cardiac catheterization  tonight  · Mid LAD to Dist LAD lesion is 75% stenosed beyond th anastomosis of LIMA. There is heavy calcification in this vessel and not a good target for intervention  · Mid LAD lesion is 100% stenosed.  · 1st Diag lesion is 100% stenosed.  · Dist RCA lesion is 60% stenosed in a large non-grafted vessel  · There is a single vein graft to OM with a jump to Diagonal. OM is patent with good flow and Diagonal limb is patent but distal vessel is diffusely diseased and occluded.  · BASURTO to LAD is a good looking graft which is widely patent but beyond anastomosis there is heavy disease.  · No good options. Recommend medical management.  · No good targets for redo or intervention.  Cardiac catheterization June 14  1. iFR of RCA - 0.7   2. PCI of RCA with 3.25 x 23 mm LELE          ASSESSMENT:   Diagnosis Plan   1. Chronic diastolic CHF (congestive heart failure) (HCC)        2. Coronary arteriosclerosis in native artery        3. Essential hypertension        4. Hypercholesterolemia        5. Dyspnea on exertion  Adult Transthoracic Echo Complete W/ Cont if Necessary Per Protocol          PLAN:  Coronary artery disease: Continue dual antiplatelet therapy.  Continue current medical therapy.  Current anginal symptoms appear secondary to CHF exacerbation, currently no concern for underlying progression of obstructive CAD or ACS.    History of postop pericarditis status post pericardial window    Hypertension: goal less than 130/80.  Continue current medical therapy.    Hyperlipidemia: Continue high intensity statin therapy, goal LDL less than 70.    Heart failure with preserved ejection fraction, acute on chronic exacerbation: Discontinue Lasix, starting Bumex 1 mg p.o. twice daily.  Continue as needed metolazone.  Starting Jardiance 10 mg p.o. daily.  Repeat BMP in 1 week at primary care physician's office.  Continue to limit sodium intake.  Repeat transthoracic echo pending to assess underlying myocardial structure and  "function.  Otherwise continue current medical therapy.  Borderline pressures limiting further aggressive titration of medical therapy.    Subjective  Reporting recent 26 to 30 pound weight gain with associated smothering sensation, dyspnea and increasing episodes of angina.  Was evaluated at [facility].  Underwent diuresis with IV Bumex.  Was started on a short course of oral Bumex.  Also reinitiated on Ranexa which is helped with the majority of his anginal symptoms.  Blood pressures at home running less than 110/65 mmHg.    PHYSICAL EXAMINATION:  Vitals:    10/18/22 1530   BP: 102/52   BP Location: Right arm   Patient Position: Sitting   Pulse: 86   SpO2: 91%   Weight: (!) 149 kg (328 lb)   Height: 177.8 cm (70\")     General Appearance:    Alert, cooperative, no distress, appears stated age   Head:    Normocephalic, without obvious abnormality, atraumatic   Eyes:    conjunctiva/corneas clear   Nose:   Nares normal, septum midline, mucosa normal, no drainage   Throat:   Lips, teeth and gums normal   Neck:   Supple, symmetrical, trachea midline, no carotid    bruit or JVD   Lungs:    Diminished at the bases bilaterally, respirations unlabored   Chest Wall:    No tenderness or deformity    Heart:    Regular rate and rhythm, S1 and S2 normal, no murmur, rub   or gallop, normal carotid impulse bilaterally without bruit.   Abdomen:     Soft, non-tender   Extremities:   Extremities normal, atraumatic, no cyanosis, trivial bilateral pretibial edema   Pulses:   2+ and symmetric all extremities   Skin:   Skin color, texture, turgor normal, no rashes or lesions       Diagnostic Data:  Procedures  Lab Results   Component Value Date    CHLPL 123 07/01/2021    TRIG 111 06/14/2022    HDL 30 (L) 06/14/2022     Lab Results   Component Value Date    GLUCOSE 123 (H) 06/14/2022    BUN 15 06/14/2022    CREATININE 0.81 06/14/2022     06/14/2022    K 4.3 06/14/2022     06/14/2022    CO2 25.3 06/14/2022     Lab Results "   Component Value Date    HGBA1C 6.80 (H) 06/08/2022     Lab Results   Component Value Date    WBC 10.12 06/14/2022    HGB 12.3 (L) 06/14/2022    HCT 38.2 06/14/2022     06/14/2022       Allergies  Allergies   Allergen Reactions   • Ajovy [Fremanezumab-Vfrm] Other (See Comments)     Caused tightness of chest, vomiting, pain in both arms.   • Sulfa Antibiotics Anaphylaxis, Nausea And Vomiting and Delirium   • Invokana [Canagliflozin] Unknown - Low Severity     DKA   • Codeine Nausea And Vomiting     Can only take with Phenergan   • Morphine Unknown - Low Severity     Does not work. It causes pain       Current Medications    Current Outpatient Medications:   •  albuterol sulfate  (90 Base) MCG/ACT inhaler, Inhale 2 puffs 4 (Four) Times a Day., Disp: 8.5 g, Rfl: 2  •  ARIPiprazole (ABILIFY) 5 MG tablet, Take 1 tablet by mouth every night at bedtime., Disp: , Rfl:   •  aspirin 81 MG EC tablet, Take 81 mg by mouth Daily., Disp: , Rfl:   •  atorvastatin (LIPITOR) 80 MG tablet, Take 1 tablet by mouth Every Night., Disp: 90 tablet, Rfl: 1  •  budesonide-formoterol (Symbicort) 80-4.5 MCG/ACT inhaler, Inhale 2 puffs 2 (Two) Times a Day. Rinse mouth with water after use., Disp: 30.6 g, Rfl: 1  •  clopidogrel (PLAVIX) 75 MG tablet, Take 1 tablet by mouth Daily., Disp: 90 tablet, Rfl: 1  •  coenzyme Q10 100 MG capsule, Take 100 mg by mouth Daily., Disp: , Rfl:   •  cyanocobalamin 1000 MCG/ML injection, INJECT 1 ML UNDER THE SKIN EVERY WEEK FOR 4 WEEKS AND THEN MONTHLY, Disp: , Rfl:   •  Dupilumab (Dupixent) 300 MG/2ML solution pen-injector, Inject 300 mg under the skin into the appropriate area as directed Every 14 (Fourteen) Days., Disp: , Rfl:   •  gabapentin (NEURONTIN) 600 MG tablet, Take 1 tablet by mouth 3 (Three) Times a Day., Disp: 270 tablet, Rfl: 3  •  HYDROmorphone (DILAUDID) 2 MG tablet, Take 1 tablet by mouth As Needed., Disp: , Rfl:   •  HYDROmorphone (DILAUDID) 4 MG tablet, Take 4 mg by mouth Daily  As Needed (migraine)., Disp: , Rfl:   •  hydrOXYzine (ATARAX) 25 MG tablet, Take 1 tablet by mouth 2 (Two) Times a Day As Needed., Disp: , Rfl:   •  Insulin Regular Human, Conc, (HumuLIN R) 500 UNIT/ML solution pen-injector CONCENTRATED injection, Inject 180 Units under the skin into the appropriate area as directed Daily With Lunch. Take 190 units in am, 180 units in afternoon and 180 units in the evening., Disp: , Rfl:   •  ipratropium-albuterol (DUO-NEB) 0.5-2.5 mg/3 ml nebulizer, Take 3 mL by nebulization 4 (Four) Times a Day As Needed for Wheezing or Shortness of Air., Disp: 360 mL, Rfl: 1  •  lamoTRIgine (LaMICtal) 100 MG tablet, Take 100 mg by mouth Every Morning., Disp: , Rfl:   •  lamoTRIgine (LaMICtal) 100 MG tablet, Take 50 mg by mouth Every Evening., Disp: , Rfl:   •  levocetirizine (XYZAL) 5 MG tablet, Take 1 tablet by mouth Every Evening., Disp: 90 tablet, Rfl: 3  •  lisinopril (PRINIVIL,ZESTRIL) 5 MG tablet, Take 5 mg by mouth every night at bedtime., Disp: , Rfl:   •  metoprolol succinate XL (TOPROL-XL) 200 MG 24 hr tablet, Take 1 tablet by mouth Daily, Disp: 90 tablet, Rfl: 1  •  nitroglycerin (NITROLINGUAL) 0.4 MG/SPRAY spray, SPRAY 1 SPRAY UNDER THE TONGUE EVERY 5 MINUTES AS NEEDED FOR CHEST PAIN, Disp: 4.9 g, Rfl: 1  •  O2 (OXYGEN), Inhale 2 L/min As Needed., Disp: , Rfl:   •  omeprazole (priLOSEC) 20 MG capsule, Take 1 capsule by mouth Daily., Disp: , Rfl:   •  pantoprazole (PROTONIX) 40 MG EC tablet, Take 1 tablet by mouth Every Morning., Disp: 30 tablet, Rfl: 0  •  potassium chloride (K-DUR,KLOR-CON) 20 MEQ CR tablet, Take 1 tablet by mouth Daily As Needed (take with lasix)., Disp: 30 tablet, Rfl: 5  •  predniSONE (DELTASONE) 20 MG tablet, Take 2 tablets by mouth Daily., Disp: , Rfl:   •  ranolazine (Ranexa) 500 MG 12 hr tablet, Take 1 tablet by mouth 2 (Two) Times a Day., Disp: 60 tablet, Rfl: 1  •  Semaglutide, 1 MG/DOSE, (Ozempic, 1 MG/DOSE,) 2 MG/1.5ML solution pen-injector, Inject 1 mg  under the skin into the appropriate area as directed 1 (One) Time Per Week., Disp: 9 mL, Rfl: 3  •  spironolactone (ALDACTONE) 25 MG tablet, TAKE 1 TABLT BY MOUTH ONCE DAILY, Disp: 90 tablet, Rfl: 1  •  Tiotropium Bromide Monohydrate (Spiriva Respimat) 1.25 MCG/ACT aerosol solution inhaler, Inhale 2 puffs Daily., Disp: 12 g, Rfl: 1  •  tiZANidine (ZANAFLEX) 4 MG tablet, Take 8 mg by mouth At Night As Needed for Muscle Spasms., Disp: , Rfl:   •  vitamin C (ASCORBIC ACID) 500 MG tablet, Take 500 mg by mouth Daily., Disp: , Rfl:   •  vitamin D3 125 MCG (5000 UT) capsule capsule, Take 5,000 Units by mouth Daily., Disp: , Rfl:   •  vitamin E 400 UNIT capsule, Take 1 capsule by mouth 2 (two) times a day., Disp: 60 capsule, Rfl: 5  •  Vortioxetine HBr (Trintellix) 20 MG tablet, Take 20 mg by mouth Daily., Disp: , Rfl:   •  zafirlukast (ACCOLATE) 20 MG tablet, Take 1 tablet by mouth 2 (Two) Times a Day., Disp: 180 tablet, Rfl: 1  •  bumetanide (Bumex) 1 MG tablet, Take 1 tablet by mouth 2 (Two) Times a Day., Disp: 60 tablet, Rfl: 5  •  empagliflozin (Jardiance) 10 MG tablet tablet, Take 1 tablet by mouth Daily., Disp: 90 tablet, Rfl: 5  •  isosorbide mononitrate (IMDUR) 120 MG 24 hr tablet, Take 1 tablet by mouth Daily for 30 days., Disp: 90 tablet, Rfl: 3  •  levETIRAcetam (KEPPRA) 1000 MG tablet, Take 1,000 mg by mouth 2 (Two) Times a Day., Disp: , Rfl:           ROS  ROS    SOCIAL HX  Social History     Socioeconomic History   • Marital status:    • Number of children: 1   Tobacco Use   • Smoking status: Former     Packs/day: 1.00     Years: 10.00     Pack years: 10.00     Types: Cigarettes     Quit date: 1998     Years since quittin.8   • Smokeless tobacco: Former     Types: Snuff     Quit date:    Vaping Use   • Vaping Use: Never used   Substance and Sexual Activity   • Alcohol use: Yes     Comment: 3 drinks a year   • Drug use: No   • Sexual activity: Defer       FAMILY HX  Family History   Problem  Relation Age of Onset   • Hypertension Mother    • Arthritis Mother    • Stomach cancer Mother    • Alcohol abuse Father    • Heart disease Other    • Hypertension Other    • Other Other         Neurologic disorder   • Heart attack Other    • Parkinsonism Other    • Stroke Other    • Heart disease Other    • Hypertension Other    • Heart disease Other    • Stroke Other    • Hypertension Other    • Colon cancer Neg Hx              [place] Jiménez III, MD, FACC

## 2022-10-24 NOTE — TELEPHONE ENCOUNTER
"CT scan of abdomen and pelvis done 09/22/2021 did show \"ground glass opacities,\" per Dr. Kim pt will be asked to get Covid 19 testing right away.  I called pt, left a message on voice mail asking him to return my call.  "
"I talked with pt, he stated \"I feel just fine, I am not sick at all.\"  Patient will go to [facility] for Covid 19 test asap.  "
"Pt did have Covid test done, it was not detected.  Pt is however sick at this time with \"cold/flu-like illness.\"  Per Dr. Kim pt was rescheduled (2 weeks out) for follow-up visit.  "
11-year-old female presenting with atraumatic pain over right ankle for last 3 to 4 days.  Worse with ambulation.  No similar prior episodes.  No associated fevers or chills.  Otherwise healthy vaccinations up-to-date.  On exam no notable erythema of joint, no significant effusion.  Has normal ROM of extremities.  Cause of pain unclear from history and exam - plan for screening x-rays to rule out SCFE or occult fracture.  If ED work-up negative will give close follow-up with pediatric orthopedics for further work-up.

## 2022-11-07 ENCOUNTER — HOSPITAL ENCOUNTER (OUTPATIENT)
Dept: NON INVASIVE DIAGNOSTICS | Facility: HOSPITAL | Age: 62
Discharge: HOME | End: 2022-11-07
Payer: MEDICARE

## 2022-11-07 LAB
LV EF: 68 %
LVEF MODALITY: NORMAL

## 2022-11-07 PROCEDURE — 93306 TTE W/DOPPLER COMPLETE: CPT

## 2022-11-17 NOTE — PROGRESS NOTES
Subjective   Patient ID: Denzel Harding is a 62 y.o. right hand dominant male  Pain of the Left Knee and Pain of the Right Knee (Reports ongoing bilateral knee pain, history of a couple arthroscopies both knees, ready to discuss TKA, left is greater than right)         History of Present Illness  Patient presents for evaluation of bilateral knee pain left greater than right.  No injury or trauma.  He states the Visco injections he received last year seem to help alleviate most of his pain.  He mentions over the last several months he has had to utilize a wheelchair more than he would like due to the increased knee pain.                                                 Past Medical History:   Diagnosis Date   • Anxiety    • Arthritis    • Asthma    • Brachial neuritis 01/19/2017   • Cellulitis     left arm and right leg    • Chest pain last 11/25/21    pt denies any since date of 11/25/21   • CHF (congestive heart failure) ([facility])     Patient reported history May 2020    • COPD (chronic obstructive pulmonary disease) ([facility])    • Coronary artery disease     Patient reported CABG , 3 vessel   • COVID-19 vaccine series completed     Pfizer, plus booster   • Depression    • Diabetes mellitus ([facility])     diagnosed in 1998, checks fsbg 3-4x/day   • Dizziness    • Edema    • GERD (gastroesophageal reflux disease)    • H/O chest x-ray 07/04/2015    Mild Left base atelectasis   • H/O echocardiogram 07/05/2015    Normal LVSF. EF of 60-65%. Grade 1 diastolic dysfunction of the LV myocardium. No evidence of pericardial effusion   • H/O exercise stress test    • Hearing loss     No use of hearing aids   • Heart attack ([facility]) 06/07/2022   • History of fracture     Reported 2 bones in left foot and a finger   • History of herniated intervertebral disc     History of left L5-S1 disc herniation   • History of nuclear stress test 2021    x2   • History of pneumonia    • History of pneumonia    • Hyperlipidemia    • Hypertension    •  Impaired functional mobility, balance, gait, and endurance    • LOM (loss of memory) 01/19/2017   • Lower back pain     Right   • Measles    • MUSE (nonalcoholic steatohepatitis)    • Neuropathy     feet    • EDD treated with BiPAP     BiPAP HS - instructed to bring mask/machine DOS (setting 13 and 5)   • Oxygen dependent     2L NC   • Palpitations    • Pericardial effusion    • Poor dentition     Patient reported missing multiple teeth   • Renal disorder    • Seasonal allergies    • Seizure disorder (HCC)     focal seizures   • SOB (shortness of breath)     no report of any new onset or worsening of symptoms   • Staph infection     back after sugery at the incision site, on Rside face    • Stroke (HCC)     x5 most recent 03/2021, slight weakness in hands occasionaly    • Wears glasses     reading glasses        Past Surgical History:   Procedure Laterality Date   • BACK SURGERY  2015    Dr LEANNE Coe, Discectomy L5-S1   • CARDIAC CATHETERIZATION     • CARDIAC CATHETERIZATION N/A 08/11/2017    Procedure: Coronary angiography;  Surgeon: Dallas Kim MD;  Location:  [facility]VASIVE [facility];  Service:    • CARDIAC CATHETERIZATION N/A 08/11/2017    Procedure: Left Heart Cath;  Surgeon: Dallas Kim MD;  Location:  [facility];  Service:    • CARDIAC CATHETERIZATION N/A 08/11/2017    Procedure: Left ventriculography;  Surgeon: Dallas Kim MD;  Location: [facility] CATH INVASIVE LOCATION;  Service:    • CARDIAC CATHETERIZATION      2002 x1 stent,  x1 stent   • CARDIAC CATHETERIZATION N/A 06/09/2022    Procedure: Left Heart Cath;  Surgeon: Otto Valencia MD;  Location:  [facility]ATH [facility];  Service: Cardiology;  Laterality: N/A;   • CARDIAC CATHETERIZATION N/A 06/14/2022    Procedure: Coronary angiography;  Surgeon: Joel Turpin MD;  Location:  [facility]ATH [facility];  Service: Cardiology;  Laterality: N/A;   • CARDIOVASCULAR STRESS TEST  07/03/2017     WITH DR HERNANDEZ AT [facility]   • CARPAL TUNNEL RELEASE Right    • CATARACT EXTRACTION W/ INTRAOCULAR LENS IMPLANT Right 06/12/2017    Procedure: CATARACT PHACO EXTRACTION WITH INTRAOCULAR LENS IMPLANT RIGHT ;  Surgeon: Natalie Cao MD;  Location: [facility] OR;  Service:    • CATARACT EXTRACTION W/ INTRAOCULAR LENS IMPLANT Left 07/10/2017    Procedure: CATARACT PHACO EXTRACTION WITH INTRAOCULAR LENS IMPLANT LEFT;  Surgeon: Natalie Cao MD;  Location: [facility] OR;  Service:    • CHOLECYSTECTOMY     • COLONOSCOPY     • COLONOSCOPY N/A 07/02/2020    Procedure: COLONOSCOPY;  Surgeon: Petra Crowell MD;  Location: [facility] ENDOSCOPY;  Service: Gastroenterology;  Laterality: N/A;  poor prep   • CORONARY ANGIOPLASTY WITH STENT PLACEMENT      X1-LAD 2002, Coronary 2019 (x2 total)   • CORONARY ARTERY BYPASS GRAFT N/A 08/15/2017    Procedure: CORONARY ARTERY BYPASS GRAFTING x 3 UTILIZING THE LEFT INTERNAL MAMMARY ARTERY WITH ENDOSCOPIC VEIN HARVESTING OF THE RIGHT GREATER SAPHENOUS VEIN, HAMLET, LAD ENDARECTOMY;  Surgeon: London Damon MD;  Location: [facility] OR;  Service:    • ENDOSCOPY     • EYE CAPSULOTOMY WITH LASER Right 11/25/2019    Procedure: EYE CAPSULOTOMY WITH LASER RIGHT;  Surgeon: Natalie Cao MD;  Location: [facility] OR;  Service: Ophthalmology   • EYE CAPSULOTOMY WITH LASER Left 12/09/2019    Procedure: EYE CAPSULOTOMY WITH LASER LEFT;  Surgeon: Natalie Cao MD;  Location: [facility] OR;  Service: Ophthalmology   • EYE SURGERY      cataract surgery both eyes   • INTERVENTIONAL RADIOLOGY PROCEDURE Bilateral 12/31/2019    Procedure: Carotid Cerebral Angiogram;  Surgeon: Damian Dubois MD;  Location: [facility] CATH INVASIVE LOCATION;  Service: Interventional Radiology   • KNEE ARTHROSCOPY Right 2010    Dr Lewis   • KNEE ARTHROSCOPY Left 2008    Dr Jameson   • KNEE MENISCECTOMY Right 10/15/2020    Procedure: Right knee arthroscopy with partial medial and lateral meniscectomy and three compartment chondroplasty.;  Surgeon:  South Lewis MD;  Location: [facility] OR;  Service: Orthopedics;  Laterality: Right;   • MOUTH SURGERY      complete extraction    • NEUROPLASTY / TRANSPOSITION ULNAR NERVE AT ELBOW Left    • OTHER SURGICAL HISTORY      Foraminotomy and discectomy   • PERICARDIAL WINDOW N/A 2017    Procedure: PERICARDIAL WINDOW;  Surgeon: Freeman Phillips MD;  Location: [facility] OR;  Service:    • VENTRAL/INCISIONAL HERNIA REPAIR N/A 2022    Procedure: INCISIONAL HERNIA REPAIR  WITH MESH;  Surgeon: Torres Kim MD;  Location: [facility] OR;  Service: General;  Laterality: N/A;       Family History   Problem Relation Age of Onset   • Hypertension Mother    • Arthritis Mother    • Stomach cancer Mother    • Alcohol abuse Father    • Heart disease Other    • Hypertension Other    • Other Other         Neurologic disorder   • Heart attack Other    • Parkinsonism Other    • Stroke Other    • Heart disease Other    • Hypertension Other    • Heart disease Other    • Stroke Other    • Hypertension Other    • Colon cancer Neg Hx        Social History     Socioeconomic History   • Marital status:    • Number of children: 1   Tobacco Use   • Smoking status: Former     Packs/day: 1.00     Years: 10.00     Pack years: 10.00     Types: Cigarettes     Quit date: 1998     Years since quittin.8   • Smokeless tobacco: Former     Types: Snuff     Quit date:    Vaping Use   • Vaping Use: Never used   Substance and Sexual Activity   • Alcohol use: Yes     Comment: 3 drinks a year   • Drug use: No   • Sexual activity: Defer         Current Outpatient Medications:   •  albuterol sulfate  (90 Base) MCG/ACT inhaler, Inhale 2 puffs 4 (Four) Times a Day., Disp: 8.5 g, Rfl: 2  •  ARIPiprazole (ABILIFY) 5 MG tablet, Take 1 tablet by mouth every night at bedtime., Disp: , Rfl:   •  aspirin 81 MG EC tablet, Take 81 mg by mouth Daily., Disp: , Rfl:   •  atorvastatin (LIPITOR) 80 MG tablet, Take 1 tablet by mouth Every Night.,  Disp: 90 tablet, Rfl: 1  •  budesonide-formoterol (Symbicort) 80-4.5 MCG/ACT inhaler, Inhale 2 puffs 2 (Two) Times a Day. Rinse mouth with water after use., Disp: 30.6 g, Rfl: 1  •  bumetanide (Bumex) 1 MG tablet, Take 1 tablet by mouth 2 (Two) Times a Day., Disp: 60 tablet, Rfl: 5  •  clopidogrel (PLAVIX) 75 MG tablet, Take 1 tablet by mouth Daily., Disp: 90 tablet, Rfl: 1  •  coenzyme Q10 100 MG capsule, Take 100 mg by mouth Daily., Disp: , Rfl:   •  cyanocobalamin 1000 MCG/ML injection, INJECT 1 ML UNDER THE SKIN EVERY WEEK FOR 4 WEEKS AND THEN MONTHLY, Disp: , Rfl:   •  Dupixent 300 MG/2ML solution prefilled syringe, , Disp: , Rfl:   •  empagliflozin (Jardiance) 10 MG tablet tablet, Take 1 tablet by mouth Daily., Disp: 90 tablet, Rfl: 5  •  gabapentin (NEURONTIN) 600 MG tablet, Take 1 tablet by mouth 3 (Three) Times a Day., Disp: 270 tablet, Rfl: 3  •  HYDROmorphone (DILAUDID) 2 MG tablet, Take 1 tablet by mouth As Needed., Disp: , Rfl:   •  HYDROmorphone (DILAUDID) 4 MG tablet, Take 4 mg by mouth Daily As Needed (migraine)., Disp: , Rfl:   •  hydrOXYzine (ATARAX) 25 MG tablet, Take 1 tablet by mouth 2 (Two) Times a Day As Needed., Disp: , Rfl:   •  Insulin Regular Human, Conc, (HumuLIN R) 500 UNIT/ML solution pen-injector CONCENTRATED injection, Inject 180 Units under the skin into the appropriate area as directed Daily With Lunch. Take 190 units in am, 180 units in afternoon and 180 units in the evening., Disp: , Rfl:   •  ipratropium-albuterol (DUO-NEB) 0.5-2.5 mg/3 ml nebulizer, Take 3 mL by nebulization 4 (Four) Times a Day As Needed for Wheezing or Shortness of Air., Disp: 360 mL, Rfl: 1  •  isosorbide mononitrate (IMDUR) 120 MG 24 hr tablet, Take 1 tablet by mouth Daily for 30 days., Disp: 90 tablet, Rfl: 3  •  lamoTRIgine (LaMICtal) 100 MG tablet, Take 50 mg by mouth Every Evening., Disp: , Rfl:   •  levETIRAcetam (KEPPRA) 1000 MG tablet, Take 1,000 mg by mouth 2 (Two) Times a Day., Disp: , Rfl:   •   levocetirizine (XYZAL) 5 MG tablet, Take 1 tablet by mouth Every Evening., Disp: 90 tablet, Rfl: 3  •  lisinopril (PRINIVIL,ZESTRIL) 5 MG tablet, Take 5 mg by mouth every night at bedtime., Disp: , Rfl:   •  metoprolol succinate XL (TOPROL-XL) 200 MG 24 hr tablet, Take 1 tablet by mouth Daily, Disp: 90 tablet, Rfl: 1  •  nitroglycerin (NITROLINGUAL) 0.4 MG/SPRAY spray, SPRAY 1 SPRAY UNDER THE TONGUE EVERY 5 MINUTES AS NEEDED FOR CHEST PAIN, Disp: 4.9 g, Rfl: 1  •  O2 (OXYGEN), Inhale 2 L/min As Needed., Disp: , Rfl:   •  omeprazole (priLOSEC) 20 MG capsule, Take 1 capsule by mouth Daily., Disp: , Rfl:   •  Ozempic, 1 MG/DOSE, 4 MG/3ML solution pen-injector, INJECT 1 MILLIGRAM UNDER THE SKIN ONCE EVERY WEEK ON SUNDAYS, Disp: , Rfl:   •  potassium chloride (K-DUR,KLOR-CON) 20 MEQ CR tablet, Take 1 tablet by mouth Daily As Needed (take with lasix)., Disp: 30 tablet, Rfl: 5  •  predniSONE (DELTASONE) 20 MG tablet, Take 2 tablets by mouth Daily., Disp: , Rfl:   •  ranolazine (Ranexa) 500 MG 12 hr tablet, Take 1 tablet by mouth 2 (Two) Times a Day., Disp: 60 tablet, Rfl: 1  •  spironolactone (ALDACTONE) 25 MG tablet, TAKE 1 TABLT BY MOUTH ONCE DAILY, Disp: 90 tablet, Rfl: 1  •  Tiotropium Bromide Monohydrate (Spiriva Respimat) 1.25 MCG/ACT aerosol solution inhaler, Inhale 2 puffs Daily., Disp: 12 g, Rfl: 1  •  tiZANidine (ZANAFLEX) 4 MG tablet, Take 8 mg by mouth At Night As Needed for Muscle Spasms., Disp: , Rfl:   •  vitamin C (ASCORBIC ACID) 500 MG tablet, Take 500 mg by mouth Daily., Disp: , Rfl:   •  vitamin D3 125 MCG (5000 UT) capsule capsule, Take 5,000 Units by mouth Daily., Disp: , Rfl:   •  vitamin E 400 UNIT capsule, Take 1 capsule by mouth 2 (two) times a day., Disp: 60 capsule, Rfl: 5  •  Vortioxetine HBr (Trintellix) 20 MG tablet, Take 20 mg by mouth Daily., Disp: , Rfl:   •  zafirlukast (ACCOLATE) 20 MG tablet, Take 1 tablet by mouth 2 (Two) Times a Day., Disp: 180 tablet, Rfl: 1    Allergies   Allergen  "Reactions   • Ajovy [Fremanezumab-Vfrm] Other (See Comments)     Caused tightness of chest, vomiting, pain in both arms.   • Sulfa Antibiotics Anaphylaxis, Nausea And Vomiting and Delirium   • Invokana [Canagliflozin] Unknown - Low Severity     DKA   • Codeine Nausea And Vomiting     Can only take with Phenergan   • Morphine Unknown - Low Severity     Does not work. It causes pain       Review of Systems   Constitutional: Negative for diaphoresis, fever and unexpected weight change.   HENT: Negative for dental problem and sore throat.    Eyes: Negative for visual disturbance.   Respiratory: Negative for shortness of breath.    Cardiovascular: Negative for chest pain.   Gastrointestinal: Negative for abdominal pain, constipation, diarrhea, nausea and vomiting.   Genitourinary: Negative for difficulty urinating and frequency.   Musculoskeletal: Positive for arthralgias (bilateral knee) and gait problem (presents in wheelchair).   Neurological: Negative for headaches.   Hematological: Does not bruise/bleed easily.       I have reviewed the medical and surgical history, family history, social history, medications, and/or allergies, and the review of systems of this report.    Objective   Temp 96.9 °F (36.1 °C)   Ht 177.8 cm (70\")   Wt (!) 142 kg (314 lb)   BMI 45.05 kg/m²    Physical Exam  Vitals and nursing note reviewed.   Constitutional:       Appearance: Normal appearance.   Pulmonary:      Effort: Pulmonary effort is normal.   Musculoskeletal:      Right knee: Crepitus present. No ecchymosis. Tenderness present over the medial joint line and lateral joint line. Normal meniscus.      Left knee: Crepitus present. No ecchymosis. Tenderness present over the medial joint line and lateral joint line. Normal meniscus.   Neurological:      Mental Status: He is alert and oriented to person, place, and time.       Right Knee Exam     Range of Motion   Extension: 5   Flexion: 110       Left Knee Exam     Range of Motion "   Extension: 5   Flexion: 100            Extremity DVT signs are negative on physical exam with negative Don sign, no calf pain, no palpable cords and no skin tone change   Neurologic Exam     Mental Status   Oriented to person, place, and time.      Bilateral knee extensor mechanisms are intact.        Assessment & Plan   Independent Review of Radiographic Studies:    X-ray of the bilateral knee 2 view each performed in the clinic independently reviewed for the evaluation of knee pain.  Comparison films are available to review.  There has been interval left knee medial joint space narrowing with patellofemoral degenerative changes noted to bilateral knee.  There is also evidence of left knee chondrocalcinosis.  No acute fracture or dislocation noted      Large Joint Arthrocentesis: R knee  Date/Time: 11/17/2022 11:39 AM  Consent given by: patient  Site marked: site marked  Timeout: Immediately prior to procedure a time out was called to verify the correct patient, procedure, equipment, support staff and site/side marked as required   Supporting Documentation  Indications: pain   Procedure Details  Location: knee - R knee  Preparation: Patient was prepped and draped in the usual sterile fashion  Needle size: 22 G  Approach: anterolateral  Medications administered: 40 mg methylPREDNISolone acetate 40 MG/ML; 2 mL lidocaine 1 %  Patient tolerance: patient tolerated the procedure well with no immediate complications    Large Joint Arthrocentesis: L knee  Date/Time: 11/17/2022 11:40 AM  Consent given by: patient  Site marked: site marked  Timeout: Immediately prior to procedure a time out was called to verify the correct patient, procedure, equipment, support staff and site/side marked as required   Supporting Documentation  Indications: pain   Procedure Details  Location: knee - L knee  Preparation: Patient was prepped and draped in the usual sterile fashion  Needle size: 22 G  Approach: anterolateral  Medications  administered: 40 mg methylPREDNISolone acetate 40 MG/ML; 2 mL lidocaine 1 %  Patient tolerance: patient tolerated the procedure well with no immediate complications             Diagnoses and all orders for this visit:    1. Arthralgia of both knees (Primary)    2. Primary osteoarthritis of both knees    3. Obesity, morbid, BMI 40.0-49.9 ([facility])    Other orders  -     Large Joint Arthrocentesis: R knee  -     Large Joint Arthrocentesis: L knee       Orthopedic activities reviewed and patient expressed appreciation  Discussion of orthopedic goals  Risk, benefits, and merits of treatment alternatives reviewed with the patient and questions answered  Call or notify for any adverse effect from injection therapy    Recommendations/Plan:  Exercise, medications, injections, other patient advice, and return appointment as noted.  Patient is encouraged to call or return for any issues or concerns.    I would like to start with bilateral knee cortisone injections today.  We will obtain approval from his insurance for Visco injections.  I explained to him I would like to avoid total joint replacement due to his complex medical history and his current BMI being 45.05.  We did have a lengthy discussion today in office about lowering his BMI by reducing his body weight.  I did provide him with educational literature on lowering BMI and ways to achieve weight loss.  I would like to recheck him in 3 months to see if his weight loss has improved.  He also understands that losing weight may in fact help alleviate joint pain.  He understands that he would not be an ideal total joint replacement candidate until his BMI is 40 or below patient agreeable to call or return sooner for any concerns.        EMR Dragon-transcription disclaimer:  This encounter note is an electronic transcription of spoken language to printed text.  Electronic transcription of spoken language may permit erroneous or at times nonsensical words or phrases to be  inadvertently transcribed.  Although I have reviewed the note for such errors, some may still exist

## 2022-12-20 NOTE — PROGRESS NOTES
Subjective   Denzel Harding is a 62 y.o. male here today for injection therapy.    Chief Complaint   Patient presents with   • Right Knee - Injections     Supartz #2   • Left Knee - Injections   patient presents for bilateral knee visco injection # 2    Past Medical History:   Diagnosis Date   • Anxiety    • Arthritis    • Asthma    • Brachial neuritis 01/19/2017   • Cellulitis     left arm and right leg    • Chest pain last 11/25/21    pt denies any since date of 11/25/21   • CHF (congestive heart failure) ([facility])     Patient reported history May 2020    • COPD (chronic obstructive pulmonary disease) ([facility])    • Coronary artery disease     Patient reported CABG , 3 vessel   • COVID-19 vaccine series completed     Pfizer, plus booster   • Depression    • Diabetes mellitus ([facility])     diagnosed in 1998, checks fsbg 3-4x/day   • Dizziness    • Edema    • GERD (gastroesophageal reflux disease)    • H/O chest x-ray 07/04/2015    Mild Left base atelectasis   • H/O echocardiogram 07/05/2015    Normal LVSF. EF of 60-65%. Grade 1 diastolic dysfunction of the LV myocardium. No evidence of pericardial effusion   • H/O exercise stress test    • Hearing loss     No use of hearing aids   • Heart attack (HCC) 06/07/2022   • History of fracture     Reported 2 bones in left foot and a finger   • History of herniated intervertebral disc     History of left L5-S1 disc herniation   • History of nuclear stress test 2021    x2   • History of pneumonia    • History of pneumonia    • Hyperlipidemia    • Hypertension    • Impaired functional mobility, balance, gait, and endurance    • LOM (loss of memory) 01/19/2017   • Lower back pain     Right   • Measles    • MUSE (nonalcoholic steatohepatitis)    • Neuropathy     feet    • EDD treated with BiPAP     BiPAP HS - instructed to bring mask/machine DOS (setting 13 and 5)   • Oxygen dependent     2L NC   • Palpitations    • Pericardial effusion    • Poor dentition     Patient reported  missing multiple teeth   • Renal disorder    • Seasonal allergies    • Seizure disorder (HCC)     focal seizures   • SOB (shortness of breath)     no report of any new onset or worsening of symptoms   • Staph infection     back after sugery at the incision site, on Rside face    • Stroke (HCC)     x5 most recent 03/2021, slight weakness in hands occasionaly    • Wears glasses     reading glasses         Past Surgical History:   Procedure Laterality Date   • BACK SURGERY  2015    Dr LEANNE Coe, Discectomy L5-S1   • CARDIAC CATHETERIZATION     • CARDIAC CATHETERIZATION N/A 08/11/2017    Procedure: Coronary angiography;  Surgeon: Dallas Hernandez MD;  Location: [facility] CATH INVASIVE LOCATION;  Service:    • CARDIAC CATHETERIZATION N/A 08/11/2017    Procedure: Left Heart Cath;  Surgeon: Dallas Hernandez MD;  Location: [facility] CATH INVASIVE LOCATION;  Service:    • CARDIAC CATHETERIZATION N/A 08/11/2017    Procedure: Left ventriculography;  Surgeon: Dallas Hernandez MD;  Location: [facility] CATH INVASIVE LOCATION;  Service:    • CARDIAC CATHETERIZATION      2002 x1 stent,  x1 stent   • CARDIAC CATHETERIZATION N/A 06/09/2022    Procedure: Left Heart Cath;  Surgeon: Otto Valencia MD;  Location: [facility] CATH INVASIVE LOCATION;  Service: Cardiology;  Laterality: N/A;   • CARDIAC CATHETERIZATION N/A 06/14/2022    Procedure: Coronary angiography;  Surgeon: Joel Turpin MD;  [facility];  Service: Cardiology;  Laterality: N/A;   • CARDIOVASCULAR STRESS TEST  07/03/2017    WITH DR HERNANDEZ AT [facility]   • CARPAL TUNNEL RELEASE Right    • CATARACT EXTRACTION W/ INTRAOCULAR LENS IMPLANT Right 06/12/2017    Procedure: CATARACT PHACO EXTRACTION WITH INTRAOCULAR LENS IMPLANT RIGHT ;  Surgeon: Natalie Cao MD;  Location: [facility];  Service:    • CATARACT EXTRACTION W/ INTRAOCULAR LENS IMPLANT Left 07/10/2017    Procedure: CATARACT PHACO EXTRACTION WITH INTRAOCULAR LENS IMPLANT LEFT;   Surgeon: Natalie Cao MD;  Location:  [facility];  Service:    • CHOLECYSTECTOMY     • COLONOSCOPY     • COLONOSCOPY N/A 07/02/2020    Procedure: COLONOSCOPY;  Surgeon: Petra Crowell MD;  Location: [facility] ENDOSCOPY;  Service: Gastroenterology;  Laterality: N/A;  poor prep   • CORONARY ANGIOPLASTY WITH STENT PLACEMENT      X1-LAD 2002, Coronary 2019 (x2 total)   • CORONARY ARTERY BYPASS GRAFT N/A 08/15/2017    Procedure: CORONARY ARTERY BYPASS GRAFTING x 3 UTILIZING THE LEFT INTERNAL MAMMARY ARTERY WITH ENDOSCOPIC VEIN HARVESTING OF THE RIGHT GREATER SAPHENOUS VEIN, HAMLET, LAD ENDARECTOMY;  Surgeon: London Damon MD;  Location:  [facility];  Service:    • ENDOSCOPY     • EYE CAPSULOTOMY WITH LASER Right 11/25/2019    Procedure: EYE CAPSULOTOMY WITH LASER RIGHT;  Surgeon: Natalie Cao MD;  Location:  [facility];  Service: Ophthalmology   • EYE CAPSULOTOMY WITH LASER Left 12/09/2019    Procedure: EYE CAPSULOTOMY WITH LASER LEFT;  Surgeon: Natalie Cao MD;  Location:  [facility];  Service: Ophthalmology   • EYE SURGERY      cataract surgery both eyes   • INTERVENTIONAL RADIOLOGY PROCEDURE Bilateral 12/31/2019    Procedure: Carotid Cerebral Angiogram;  Surgeon: Damian Dubois MD;  Location:  [facility] CATH INVASIVE LOCATION;  Service: Interventional Radiology   • KNEE ARTHROSCOPY Right 2010    Dr Lewis   • KNEE ARTHROSCOPY Left 2008    Dr Jameson   • KNEE MENISCECTOMY Right 10/15/2020    Procedure: Right knee arthroscopy with partial medial and lateral meniscectomy and three compartment chondroplasty.;  Surgeon: South Lewis MD;  Location:  [facility];  Service: Orthopedics;  Laterality: Right;   • MOUTH SURGERY      complete extraction    • NEUROPLASTY / TRANSPOSITION ULNAR NERVE AT ELBOW Left    • OTHER SURGICAL HISTORY      Foraminotomy and discectomy   • PERICARDIAL WINDOW N/A 08/25/2017    Procedure: PERICARDIAL WINDOW;  Surgeon: Freeman Phillips MD;  Location:  [facility];  Service:    • VENTRAL/INCISIONAL  "HERNIA REPAIR N/A 01/12/2022    Procedure: INCISIONAL HERNIA REPAIR  WITH MESH;  Surgeon: Torres Kim MD;  Location: [facility];  Service: General;  Laterality: N/A;       Allergies   Allergen Reactions   • Ajovy [Fremanezumab-Vfrm] Other (See Comments)     Caused tightness of chest, vomiting, pain in both arms.   • Sulfa Antibiotics Anaphylaxis, Nausea And Vomiting and Delirium   • Invokana [Canagliflozin] Unknown - Low Severity     DKA   • Codeine Nausea And Vomiting     Can only take with Phenergan   • Morphine Unknown - Low Severity     Does not work. It causes pain       Objective   Temp 98.1 °F (36.7 °C)   Ht 177.8 cm (70\")   Wt (!) 142 kg (314 lb)   BMI 45.05 kg/m²    Physical Exam    Skin exam stable with no erythema, ecchymosis or rash.  No new swelling.  No motor or sensory changes.  Distal pulse intact.    Large Joint Arthrocentesis: R knee  Date/Time: 12/20/2022 10:57 AM  Consent given by: patient  Site marked: site marked  Timeout: Immediately prior to procedure a time out was called to verify the correct patient, procedure, equipment, support staff and site/side marked as required   Supporting Documentation  Indications: pain   Procedure Details  Location: knee - R knee  Preparation: Patient was prepped and draped in the usual sterile fashion  Needle gauge: 21 G.  Approach: anterolateral  Medications administered: 25 mg sodium hyaluronate 25 MG/2.5ML  Patient tolerance: patient tolerated the procedure well with no immediate complications    Large Joint Arthrocentesis: L knee  Date/Time: 12/20/2022 10:58 AM  Consent given by: patient  Site marked: site marked  Timeout: Immediately prior to procedure a time out was called to verify the correct patient, procedure, equipment, support staff and site/side marked as required   Supporting Documentation  Indications: pain   Procedure Details  Location: knee - L knee  Preparation: Patient was prepped and draped in the usual sterile fashion  Needle gauge: " 21 G.  Approach: anterolateral  Medications administered: 25 mg sodium hyaluronate 25 MG/2.5ML  Patient tolerance: patient tolerated the procedure well with no immediate complications          Assessment & Plan      Diagnosis Plan   1. Primary osteoarthritis of both knees        2. Arthralgia of both knees            Discussion of orthopaedic goals and activities and patient expressed appreciation.  Guided on proper techniques for mobility, strength, agility and/or conditioning exercises  Ice, heat, and/or modalities as beneficial  Watch for signs and symptoms of infection  Call or notify for any adverse effect from injection therapy    Recommendations:  Follow up in 1 week    Patient agreeable to call or return sooner for any concerns.

## 2022-12-29 NOTE — PROGRESS NOTES
Subjective   Denzel Harding is a 62 y.o. male here today for injection therapy.    Chief Complaint   Patient presents with   • Left Knee - Injections     Supartz #3.   • Right Knee - Injections   patient presents for 3rd visco injection bilateral knee.    Past Medical History:   Diagnosis Date   • Anxiety    • Arthritis    • Asthma    • Brachial neuritis 01/19/2017   • Cellulitis     left arm and right leg    • Chest pain last 11/25/21    pt denies any since date of 11/25/21   • CHF (congestive heart failure) ([facility])     Patient reported history May 2020    • COPD (chronic obstructive pulmonary disease) ([facility])    • Coronary artery disease     Patient reported CABG , 3 vessel   • COVID-19 vaccine series completed     Pfizer, plus booster   • Depression    • Diabetes mellitus ([facility])     diagnosed in 1998, checks fsbg 3-4x/day   • Dizziness    • Edema    • GERD (gastroesophageal reflux disease)    • H/O chest x-ray 07/04/2015    Mild Left base atelectasis   • H/O echocardiogram 07/05/2015    Normal LVSF. EF of 60-65%. Grade 1 diastolic dysfunction of the LV myocardium. No evidence of pericardial effusion   • H/O exercise stress test    • Hearing loss     No use of hearing aids   • Heart attack (HCC) 06/07/2022   • History of fracture     Reported 2 bones in left foot and a finger   • History of herniated intervertebral disc     History of left L5-S1 disc herniation   • History of nuclear stress test 2021    x2   • History of pneumonia    • History of pneumonia    • Hyperlipidemia    • Hypertension    • Impaired functional mobility, balance, gait, and endurance    • LOM (loss of memory) 01/19/2017   • Lower back pain     Right   • Measles    • MUSE (nonalcoholic steatohepatitis)    • Neuropathy     feet    • EDD treated with BiPAP     BiPAP HS - instructed to bring mask/machine DOS (setting 13 and 5)   • Oxygen dependent     2L NC   • Palpitations    • Pericardial effusion    • Poor dentition     Patient reported  missing multiple teeth   • Renal disorder    • Seasonal allergies    • Seizure disorder (HCC)     focal seizures   • SOB (shortness of breath)     no report of any new onset or worsening of symptoms   • Staph infection     back after sugery at the incision site, on Rside face    • Stroke (HCC)     x5 most recent 03/2021, slight weakness in hands occasionaly    • Wears glasses     reading glasses         Past Surgical History:   Procedure Laterality Date   • BACK SURGERY  2015    Dr LEANNE Coe, Discectomy L5-S1   • CARDIAC CATHETERIZATION     • CARDIAC CATHETERIZATION N/A 08/11/2017    Procedure: Coronary angiography;  Surgeon: Dallas Hernandez MD;  Location: [facility] CATH INVASIVE LOCATION;  Service:    • CARDIAC CATHETERIZATION N/A 08/11/2017    Procedure: Left Heart Cath;  Surgeon: Dallas Hernandez MD;  Location: [facility] CATH INVASIVE LOCATION;  Service:    • CARDIAC CATHETERIZATION N/A 08/11/2017    Procedure: Left ventriculography;  Surgeon: Dallas Hernandez MD;  Location: [facility] CATH INVASIVE LOCATION;  Service:    • CARDIAC CATHETERIZATION      2002 x1 stent,  x1 stent   • CARDIAC CATHETERIZATION N/A 06/09/2022    Procedure: Left Heart Cath;  Surgeon: Otto Valencia MD;  Location: [facility] CATH INVASIVE LOCATION;  Service: Cardiology;  Laterality: N/A;   • CARDIAC CATHETERIZATION N/A 06/14/2022    Procedure: Coronary angiography;  Surgeon: Joel Turpin MD;  [facility] CATH [facility];  Service: Cardiology;  Laterality: N/A;   • CARDIOVASCULAR STRESS TEST  07/03/2017    WITH DR HERNANDEZ AT [facility]   • CARPAL TUNNEL RELEASE Right    • CATARACT EXTRACTION W/ INTRAOCULAR LENS IMPLANT Right 06/12/2017    Procedure: CATARACT PHACO EXTRACTION WITH INTRAOCULAR LENS IMPLANT RIGHT ;  Surgeon: Ntaalie Cao MD;  Location: [facility];  Service:    • CATARACT EXTRACTION W/ INTRAOCULAR LENS IMPLANT Left 07/10/2017    Procedure: CATARACT PHACO EXTRACTION WITH INTRAOCULAR LENS IMPLANT LEFT;   Surgeon: Natalie Cao MD;  Location:  [facility];  Service:    • CHOLECYSTECTOMY     • COLONOSCOPY     • COLONOSCOPY N/A 07/02/2020    Procedure: COLONOSCOPY;  Surgeon: Petra Crowell MD;  Location: [facility] ENDOSCOPY;  Service: Gastroenterology;  Laterality: N/A;  poor prep   • CORONARY ANGIOPLASTY WITH STENT PLACEMENT      X1-LAD 2002, Coronary 2019 (x2 total)   • CORONARY ARTERY BYPASS GRAFT N/A 08/15/2017    Procedure: CORONARY ARTERY BYPASS GRAFTING x 3 UTILIZING THE LEFT INTERNAL MAMMARY ARTERY WITH ENDOSCOPIC VEIN HARVESTING OF THE RIGHT GREATER SAPHENOUS VEIN, HAMLET, LAD ENDARECTOMY;  Surgeon: London Damon MD;  Location:  [facility];  Service:    • ENDOSCOPY     • EYE CAPSULOTOMY WITH LASER Right 11/25/2019    Procedure: EYE CAPSULOTOMY WITH LASER RIGHT;  Surgeon: Natalie Cao MD;  Location:  [facility];  Service: Ophthalmology   • EYE CAPSULOTOMY WITH LASER Left 12/09/2019    Procedure: EYE CAPSULOTOMY WITH LASER LEFT;  Surgeon: Natalie Cao MD;  Location:  [facility];  Service: Ophthalmology   • EYE SURGERY      cataract surgery both eyes   • INTERVENTIONAL RADIOLOGY PROCEDURE Bilateral 12/31/2019    Procedure: Carotid Cerebral Angiogram;  Surgeon: Damian Dubois MD;  Location:  [facility] CATH INVASIVE LOCATION;  Service: Interventional Radiology   • KNEE ARTHROSCOPY Right 2010    Dr Lewis   • KNEE ARTHROSCOPY Left 2008    Dr Jameson   • KNEE MENISCECTOMY Right 10/15/2020    Procedure: Right knee arthroscopy with partial medial and lateral meniscectomy and three compartment chondroplasty.;  Surgeon: South Lewis MD;  Location:  [facility];  Service: Orthopedics;  Laterality: Right;   • MOUTH SURGERY      complete extraction    • NEUROPLASTY / TRANSPOSITION ULNAR NERVE AT ELBOW Left    • OTHER SURGICAL HISTORY      Foraminotomy and discectomy   • PERICARDIAL WINDOW N/A 08/25/2017    Procedure: PERICARDIAL WINDOW;  Surgeon: Freeman Phillips MD;  Location:  [facility];  Service:    • VENTRAL/INCISIONAL  "HERNIA REPAIR N/A 01/12/2022    Procedure: INCISIONAL HERNIA REPAIR  WITH MESH;  Surgeon: Torres Kim MD;  Location: [facility];  Service: General;  Laterality: N/A;       Allergies   Allergen Reactions   • Ajovy [Fremanezumab-Vfrm] Other (See Comments)     Caused tightness of chest, vomiting, pain in both arms.   • Sulfa Antibiotics Anaphylaxis, Nausea And Vomiting and Delirium   • Invokana [Canagliflozin] Unknown - Low Severity     DKA   • Codeine Nausea And Vomiting     Can only take with Phenergan   • Morphine Unknown - Low Severity     Does not work. It causes pain       Objective   Temp 97 °F (36.1 °C)   Ht 177.8 cm (70\")   Wt (!) 142 kg (314 lb)   BMI 45.05 kg/m²    Physical Exam    Skin exam stable with no erythema, ecchymosis or rash.  No new swelling.  No motor or sensory changes.  Distal pulse intact.    Large Joint Arthrocentesis: R knee  Date/Time: 12/29/2022 1:17 PM  Consent given by: patient  Site marked: site marked  Timeout: Immediately prior to procedure a time out was called to verify the correct patient, procedure, equipment, support staff and site/side marked as required   Supporting Documentation  Indications: pain   Procedure Details  Location: knee - R knee  Preparation: Patient was prepped and draped in the usual sterile fashion  Needle size: 22 G  Approach: anterolateral  Medications administered: 25 mg sodium hyaluronate 25 MG/2.5ML  Patient tolerance: patient tolerated the procedure well with no immediate complications    Large Joint Arthrocentesis: L knee  Date/Time: 12/29/2022 1:17 PM  Consent given by: patient  Site marked: site marked  Timeout: Immediately prior to procedure a time out was called to verify the correct patient, procedure, equipment, support staff and site/side marked as required   Supporting Documentation  Indications: pain   Procedure Details  Location: knee - L knee  Preparation: Patient was prepped and draped in the usual sterile fashion  Needle size: 22 " G  Approach: anterolateral  Medications administered: 25 mg sodium hyaluronate 25 MG/2.5ML  Patient tolerance: patient tolerated the procedure well with no immediate complications          Assessment & Plan      Diagnosis Plan   1. Primary osteoarthritis of both knees            Discussion of orthopaedic goals and activities and patient expressed appreciation.  Guided on proper techniques for mobility, strength, agility and/or conditioning exercises  Ice, heat, and/or modalities as beneficial  Watch for signs and symptoms of infection  Call or notify for any adverse effect from injection therapy    Recommendations:  Follow up in 1 week  Patient agreeable to call or return sooner for any concerns.

## 2022-12-30 NOTE — PROGRESS NOTES
"  Chief Complaint   Patient presents with   • Shortness of Breath   • Follow-up       Subjective   Denzel Harding is a 62 y.o. male.     History of Present Illness   Patient was evaluated today for follow up of asthma, allergic rhinitis, and EDD. Patient does not report any recent exacerbations requiring emergency room visits or hospitalizations.    He did however have pharyngitis and was given Keflex and steroids, by ER in [place].     Patient is compliant with pulmonary medicines, as prescribed.     he is currently on Symbicort and Spiriva. he is using the rescue inhalers minimally.     He is on Dupixent every 2 weeks.     Patient says that he has been using his nasal sprays on a regular basis and describes no significant ongoing issues other than occasional congestion.     He actually has been unable to use his recalled BiPAP as he says that it has made his breathing worse and he starts wheezing in the morning, after using the BiPAP.    He actually has been sleeping in a recliner, as he snores without BiPAP. He is also having symptoms of EDS and tiredness and sleep disturbance without it.     The following portions of the patient's history were reviewed and updated as appropriate: allergies, current medications, past family history, past medical history, past social history and past surgical history.    Review of Systems   HENT: Negative for sinus pressure, sneezing and sore throat.    Respiratory: Positive for cough and shortness of breath. Negative for chest tightness and wheezing.        Objective   Visit Vitals  /69   Pulse 68   Resp 18   Ht 177.8 cm (70\")   Wt (!) 145 kg (320 lb)   SpO2 95% Comment: on RA at rest   BMI 45.92 kg/m²       Physical Exam  Vitals reviewed.   Constitutional:       Appearance: He is well-developed.   Neck:      Thyroid: No thyromegaly.      Vascular: No JVD.   Cardiovascular:      Rate and Rhythm: Normal rate.      Comments: CABG scar noted.   Pulmonary:      Effort: No " respiratory distress.      Breath sounds: No wheezing.   Musculoskeletal:         General: Normal range of motion.      Cervical back: Normal range of motion.      Comments: Used a wheelchair.    Skin:     General: Skin is warm and dry.   Neurological:      Mental Status: He is alert and oriented to person, place, and time.   Psychiatric:         Behavior: Behavior normal.         Assessment & Plan   Diagnoses and all orders for this visit:    1. Severe persistent asthma without complication (Primary)  -     CT Chest Without Contrast Diagnostic; Future    2. Other allergic rhinitis    3. EDD (obstructive sleep apnea)  -     BIPAP / CPAP Adjustment    4. Morbid obesity, unspecified obesity type (HCC)    5. Abnormal CT of the chest  -     CT Chest Without Contrast Diagnostic; Future    Other orders  -     albuterol sulfate  (90 Base) MCG/ACT inhaler; Inhale 2 puffs 4 (Four) Times a Day.  Dispense: 8.5 g; Refill: 2  -     ipratropium-albuterol (DUO-NEB) 0.5-2.5 mg/3 ml nebulizer; Take 3 mL by nebulization 4 (Four) Times a Day As Needed for Wheezing or Shortness of Air.  Dispense: 360 mL; Refill: 1           Return in about 5 months (around 5/24/2023) for Recheck, FeNO F/U, Imaging, 6MWT F/U, For Matilde (Love), ....Also 12 mths w/ Dr. Barajas (Love).    DISCUSSION (if any):  Last CT, from March 2022, suggested possible viral infection.    Will repeat CT.     ER notes from 12/4/2022 were reviewed. Mentioned Exudative pharyngitis and prescription for Cephalexin.     He will need a new BiPAP device as his current device is not functioning properly and he is having multiple issues without the use of BiPAP.    It appears that his symptoms of asthma are under adequate control with the current regimen.    Patient's medications for underlying asthma were reviewed in great detail.    He will definitely need to continue Dupixent, as it appears to have helped his symptoms and also decreased exacerbations.     Compliance  with medications stressed.     Side effects of prescribed medications discussed with the patient.    The need to continue to be aware of triggers that may cause asthma exacerbation versus progression of disease, was also discussed.    I have discussed asthma action plan with him.    Patient was advised to consider using nasal spray, if his symptoms become consistent with poorly controlled allergic rhinitis. He may need this during change of seasons.     The patient says that he is up-to-date with influenza, pnevnar and pneumovax vaccinations.     The following have been ordered, either before or at the time of the next visit.    CT of the chest  FeNO +/- Peak Flow  Walk test      Dictated utilizing Dragon dictation.    This document was electronically signed by Linda Barajas MD on 12/30/22 at 10:51 EST

## 2023-01-01 ENCOUNTER — OFFICE VISIT (OUTPATIENT)
Dept: ORTHOPEDIC SURGERY | Facility: CLINIC | Age: 63
End: 2023-01-01
Payer: MEDICARE

## 2023-01-01 ENCOUNTER — HOSPITAL ENCOUNTER (OUTPATIENT)
Dept: CT IMAGING | Facility: HOSPITAL | Age: 63
Discharge: HOME | End: 2023-01-25
Admitting: INTERNAL MEDICINE
Payer: MEDICARE

## 2023-01-01 ENCOUNTER — OFFICE VISIT (OUTPATIENT)
Dept: CARDIOLOGY | Facility: CLINIC | Age: 63
End: 2023-01-01
Payer: MEDICARE

## 2023-01-01 ENCOUNTER — HOSPITAL ENCOUNTER (EMERGENCY)
Facility: HOSPITAL | Age: 63
Discharge: HOME | End: 2023-01-23
Attending: EMERGENCY MEDICINE | Admitting: EMERGENCY MEDICINE
Payer: MEDICARE

## 2023-01-01 ENCOUNTER — APPOINTMENT (OUTPATIENT)
Dept: GENERAL RADIOLOGY | Facility: HOSPITAL | Age: 63
End: 2023-01-01
Payer: MEDICARE

## 2023-01-01 VITALS
HEART RATE: 81 BPM | HEIGHT: 70 IN | BODY MASS INDEX: 45.1 KG/M2 | SYSTOLIC BLOOD PRESSURE: 122 MMHG | DIASTOLIC BLOOD PRESSURE: 62 MMHG | OXYGEN SATURATION: 92 % | WEIGHT: 315 LBS

## 2023-01-01 VITALS — BODY MASS INDEX: 45.1 KG/M2 | TEMPERATURE: 98 F | HEIGHT: 70 IN | WEIGHT: 315 LBS

## 2023-01-01 VITALS
SYSTOLIC BLOOD PRESSURE: 175 MMHG | BODY MASS INDEX: 45.1 KG/M2 | WEIGHT: 315 LBS | RESPIRATION RATE: 20 BRPM | DIASTOLIC BLOOD PRESSURE: 87 MMHG | HEIGHT: 70 IN | TEMPERATURE: 98.1 F | HEART RATE: 77 BPM | OXYGEN SATURATION: 98 %

## 2023-01-01 VITALS — TEMPERATURE: 98.1 F | BODY MASS INDEX: 45.1 KG/M2 | WEIGHT: 315 LBS | HEIGHT: 70 IN

## 2023-01-01 DIAGNOSIS — M17.0 PRIMARY OSTEOARTHRITIS OF BOTH KNEES: Primary | ICD-10-CM

## 2023-01-01 DIAGNOSIS — R93.89 ABNORMAL CT OF THE CHEST: ICD-10-CM

## 2023-01-01 DIAGNOSIS — I25.10 CORONARY ARTERIOSCLEROSIS IN NATIVE ARTERY: ICD-10-CM

## 2023-01-01 DIAGNOSIS — E78.00 HYPERCHOLESTEROLEMIA: ICD-10-CM

## 2023-01-01 DIAGNOSIS — J45.50 SEVERE PERSISTENT ASTHMA WITHOUT COMPLICATION: ICD-10-CM

## 2023-01-01 DIAGNOSIS — M25.561 ARTHRALGIA OF BOTH KNEES: ICD-10-CM

## 2023-01-01 DIAGNOSIS — I50.32 CHRONIC DIASTOLIC CHF (CONGESTIVE HEART FAILURE): Primary | ICD-10-CM

## 2023-01-01 DIAGNOSIS — M25.562 ARTHRALGIA OF BOTH KNEES: ICD-10-CM

## 2023-01-01 DIAGNOSIS — I10 ESSENTIAL HYPERTENSION: ICD-10-CM

## 2023-01-01 DIAGNOSIS — J44.1 COPD EXACERBATION: Primary | ICD-10-CM

## 2023-01-01 LAB
ALBUMIN SERPL-MCNC: 3.9 G/DL (ref 3.5–5.2)
ALBUMIN/GLOB SERPL: 1.4 G/DL
ALP SERPL-CCNC: 82 U/L (ref 39–117)
ALT SERPL W P-5'-P-CCNC: 21 U/L (ref 1–41)
ANION GAP SERPL CALCULATED.3IONS-SCNC: 8.4 MMOL/L (ref 5–15)
AST SERPL-CCNC: 21 U/L (ref 1–40)
BASOPHILS # BLD AUTO: 0.06 10*3/MM3 (ref 0–0.2)
BASOPHILS NFR BLD AUTO: 0.6 % (ref 0–1.5)
BILIRUB SERPL-MCNC: 0.8 MG/DL (ref 0–1.2)
BUN SERPL-MCNC: 11 MG/DL (ref 8–23)
BUN/CREAT SERPL: 10.8 (ref 7–25)
CALCIUM SPEC-SCNC: 9.3 MG/DL (ref 8.6–10.5)
CHLORIDE SERPL-SCNC: 96 MMOL/L (ref 98–107)
CO2 SERPL-SCNC: 31.6 MMOL/L (ref 22–29)
CREAT SERPL-MCNC: 1.02 MG/DL (ref 0.76–1.27)
D-LACTATE SERPL-SCNC: 1.6 MMOL/L (ref 0.5–2)
DEPRECATED RDW RBC AUTO: 39.9 FL (ref 37–54)
EGFRCR SERPLBLD CKD-EPI 2021: 83.6 ML/MIN/1.73
EOSINOPHIL # BLD AUTO: 0.38 10*3/MM3 (ref 0–0.4)
EOSINOPHIL NFR BLD AUTO: 4.1 % (ref 0.3–6.2)
ERYTHROCYTE [DISTWIDTH] IN BLOOD BY AUTOMATED COUNT: 13.9 % (ref 12.3–15.4)
FLUAV AG NPH QL: NEGATIVE
FLUBV AG NPH QL IA: NEGATIVE
GLOBULIN UR ELPH-MCNC: 2.8 GM/DL
GLUCOSE SERPL-MCNC: 165 MG/DL (ref 65–99)
HCT VFR BLD AUTO: 38.3 % (ref 37.5–51)
HGB BLD-MCNC: 12.4 G/DL (ref 13–17.7)
IMM GRANULOCYTES # BLD AUTO: 0.11 10*3/MM3 (ref 0–0.05)
IMM GRANULOCYTES NFR BLD AUTO: 1.2 % (ref 0–0.5)
LYMPHOCYTES # BLD AUTO: 1.27 10*3/MM3 (ref 0.7–3.1)
LYMPHOCYTES NFR BLD AUTO: 13.7 % (ref 19.6–45.3)
MAGNESIUM SERPL-MCNC: 2.1 MG/DL (ref 1.6–2.4)
MCH RBC QN AUTO: 25.7 PG (ref 26.6–33)
MCHC RBC AUTO-ENTMCNC: 32.4 G/DL (ref 31.5–35.7)
MCV RBC AUTO: 79.5 FL (ref 79–97)
MONOCYTES # BLD AUTO: 0.54 10*3/MM3 (ref 0.1–0.9)
MONOCYTES NFR BLD AUTO: 5.8 % (ref 5–12)
NEUTROPHILS NFR BLD AUTO: 6.88 10*3/MM3 (ref 1.7–7)
NEUTROPHILS NFR BLD AUTO: 74.6 % (ref 42.7–76)
NRBC BLD AUTO-RTO: 0 /100 WBC (ref 0–0.2)
NT-PROBNP SERPL-MCNC: 227.5 PG/ML (ref 0–900)
PLATELET # BLD AUTO: 201 10*3/MM3 (ref 140–450)
PMV BLD AUTO: 10.1 FL (ref 6–12)
POTASSIUM SERPL-SCNC: 3.6 MMOL/L (ref 3.5–5.2)
PROCALCITONIN SERPL-MCNC: 0.1 NG/ML (ref 0–0.25)
PROT SERPL-MCNC: 6.7 G/DL (ref 6–8.5)
RBC # BLD AUTO: 4.82 10*6/MM3 (ref 4.14–5.8)
SARS-COV-2 RNA PNL SPEC NAA+PROBE: NOT DETECTED
SODIUM SERPL-SCNC: 136 MMOL/L (ref 136–145)
TROPONIN T SERPL-MCNC: 0.02 NG/ML (ref 0–0.03)
WBC NRBC COR # BLD: 9.24 10*3/MM3 (ref 3.4–10.8)

## 2023-01-01 PROCEDURE — 20610 DRAIN/INJ JOINT/BURSA W/O US: CPT | Performed by: PHYSICIAN ASSISTANT

## 2023-01-01 PROCEDURE — 84484 ASSAY OF TROPONIN QUANT: CPT | Performed by: NURSE PRACTITIONER

## 2023-01-01 PROCEDURE — 71250 CT THORAX DX C-: CPT

## 2023-01-01 PROCEDURE — 83605 ASSAY OF LACTIC ACID: CPT | Performed by: NURSE PRACTITIONER

## 2023-01-01 PROCEDURE — 87635 SARS-COV-2 COVID-19 AMP PRB: CPT | Performed by: NURSE PRACTITIONER

## 2023-01-01 PROCEDURE — 99214 OFFICE O/P EST MOD 30 MIN: CPT | Performed by: INTERNAL MEDICINE

## 2023-01-01 PROCEDURE — 87804 INFLUENZA ASSAY W/OPTIC: CPT | Performed by: NURSE PRACTITIONER

## 2023-01-01 PROCEDURE — 25010000002 METHYLPREDNISOLONE PER 125 MG: Performed by: NURSE PRACTITIONER

## 2023-01-01 PROCEDURE — 84145 PROCALCITONIN (PCT): CPT | Performed by: NURSE PRACTITIONER

## 2023-01-01 PROCEDURE — 93005 ELECTROCARDIOGRAM TRACING: CPT | Performed by: NURSE PRACTITIONER

## 2023-01-01 PROCEDURE — 96374 THER/PROPH/DIAG INJ IV PUSH: CPT

## 2023-01-01 PROCEDURE — 83880 ASSAY OF NATRIURETIC PEPTIDE: CPT | Performed by: NURSE PRACTITIONER

## 2023-01-01 PROCEDURE — 71045 X-RAY EXAM CHEST 1 VIEW: CPT

## 2023-01-01 PROCEDURE — 85025 COMPLETE CBC W/AUTO DIFF WBC: CPT | Performed by: NURSE PRACTITIONER

## 2023-01-01 PROCEDURE — 94640 AIRWAY INHALATION TREATMENT: CPT

## 2023-01-01 PROCEDURE — 80053 COMPREHEN METABOLIC PANEL: CPT | Performed by: NURSE PRACTITIONER

## 2023-01-01 PROCEDURE — 83735 ASSAY OF MAGNESIUM: CPT | Performed by: NURSE PRACTITIONER

## 2023-01-01 PROCEDURE — 99283 EMERGENCY DEPT VISIT LOW MDM: CPT

## 2023-01-01 RX ORDER — BUMETANIDE 1 MG/1
TABLET ORAL
Qty: 60 TABLET | Refills: 2 | Status: SHIPPED | OUTPATIENT
Start: 2023-01-01

## 2023-01-01 RX ORDER — TRAZODONE HYDROCHLORIDE 50 MG/1
25 TABLET ORAL NIGHTLY PRN
COMMUNITY
Start: 2023-01-01

## 2023-01-01 RX ORDER — SODIUM CHLORIDE 0.9 % (FLUSH) 0.9 %
10 SYRINGE (ML) INJECTION AS NEEDED
Status: DISCONTINUED | OUTPATIENT
Start: 2023-01-01 | End: 2023-01-01 | Stop reason: HOSPADM

## 2023-01-01 RX ORDER — AZITHROMYCIN 250 MG/1
TABLET, FILM COATED ORAL
Qty: 6 TABLET | Refills: 0 | Status: SHIPPED | OUTPATIENT
Start: 2023-01-01

## 2023-01-01 RX ORDER — PREDNISONE 20 MG/1
20 TABLET ORAL 2 TIMES DAILY
Qty: 10 TABLET | Refills: 0 | Status: SHIPPED | OUTPATIENT
Start: 2023-01-01 | End: 2023-01-01

## 2023-01-01 RX ORDER — METHYLPREDNISOLONE SODIUM SUCCINATE 125 MG/2ML
125 INJECTION, POWDER, LYOPHILIZED, FOR SOLUTION INTRAMUSCULAR; INTRAVENOUS ONCE
Status: COMPLETED | OUTPATIENT
Start: 2023-01-01 | End: 2023-01-01

## 2023-01-01 RX ORDER — IPRATROPIUM BROMIDE AND ALBUTEROL SULFATE 2.5; .5 MG/3ML; MG/3ML
3 SOLUTION RESPIRATORY (INHALATION) ONCE
Status: COMPLETED | OUTPATIENT
Start: 2023-01-01 | End: 2023-01-01

## 2023-01-01 RX ADMIN — METHYLPREDNISOLONE SODIUM SUCCINATE 125 MG: 125 INJECTION, POWDER, FOR SOLUTION INTRAMUSCULAR; INTRAVENOUS at 14:12

## 2023-01-01 RX ADMIN — IPRATROPIUM BROMIDE AND ALBUTEROL SULFATE 3 ML: 2.5; .5 SOLUTION RESPIRATORY (INHALATION) at 15:35

## 2023-01-04 NOTE — PROGRESS NOTES
Subjective   Denzel Harding is a 62 y.o. male here today for injection therapy.    Chief Complaint   Patient presents with   • Right Knee - Injections     Supartz #4   • Left Knee - Injections     Supartz #4     Patient presents for repeat bilateral knee Visco injection #4.  Past Medical History:   Diagnosis Date   • Anxiety    • Arthritis    • Asthma    • Brachial neuritis 01/19/2017   • Cellulitis     left arm and right leg    • Chest pain last 11/25/21    pt denies any since date of 11/25/21   • CHF (congestive heart failure) ([facility])     Patient reported history May 2020    • COPD (chronic obstructive pulmonary disease) ([facility])    • Coronary artery disease     Patient reported CABG , 3 vessel   • COVID-19 vaccine series completed     Pfizer, plus booster   • Depression    • Diabetes mellitus ([facility])     diagnosed in 1998, checks fsbg 3-4x/day   • Dizziness    • Edema    • GERD (gastroesophageal reflux disease)    • H/O chest x-ray 07/04/2015    Mild Left base atelectasis   • H/O echocardiogram 07/05/2015    Normal LVSF. EF of 60-65%. Grade 1 diastolic dysfunction of the LV myocardium. No evidence of pericardial effusion   • H/O exercise stress test    • Hearing loss     No use of hearing aids   • Heart attack (HCC) 06/07/2022   • History of fracture     Reported 2 bones in left foot and a finger   • History of herniated intervertebral disc     History of left L5-S1 disc herniation   • History of nuclear stress test 2021    x2   • History of pneumonia    • History of pneumonia    • Hyperlipidemia    • Hypertension    • Impaired functional mobility, balance, gait, and endurance    • LOM (loss of memory) 01/19/2017   • Lower back pain     Right   • Measles    • MUSE (nonalcoholic steatohepatitis)    • Neuropathy     feet    • EDD treated with BiPAP     BiPAP HS - instructed to bring mask/machine DOS (setting 13 and 5)   • Oxygen dependent     2L NC   • Palpitations    • Pericardial effusion    • Poor dentition      Patient reported missing multiple teeth   • Renal disorder    • Seasonal allergies    • Seizure disorder (HCC)     focal seizures   • SOB (shortness of breath)     no report of any new onset or worsening of symptoms   • Staph infection     back after sugery at the incision site, on Rside face    • Stroke (HCC)     x5 most recent 03/2021, slight weakness in hands occasionaly    • Wears glasses     reading glasses         Past Surgical History:   Procedure Laterality Date   • BACK SURGERY  2015    Dr LEANNE Coe, Discectomy L5-S1   • CARDIAC CATHETERIZATION     • CARDIAC CATHETERIZATION N/A 08/11/2017    Procedure: Coronary angiography;  Surgeon: Dallas Hernandez MD;  Location: [facility] CATH INVASIVE LOCATION;  Service:    • CARDIAC CATHETERIZATION N/A 08/11/2017    Procedure: Left Heart Cath;  Surgeon: Dallas Hernandez MD;  Location: [facility] CATH INVASIVE LOCATION;  Service:    • CARDIAC CATHETERIZATION N/A 08/11/2017    Procedure: Left ventriculography;  Surgeon: Dallas Hernandez MD;  Location: [facility] CATH INVASIVE LOCATION;  Service:    • CARDIAC CATHETERIZATION      2002 x1 stent,  x1 stent   • CARDIAC CATHETERIZATION N/A 06/09/2022    Procedure: Left Heart Cath;  Surgeon: Otto Valencia MD;  Location: [facility] CATH INVASIVE LOCATION;  Service: Cardiology;  Laterality: N/A;   • CARDIAC CATHETERIZATION N/A 06/14/2022    Procedure: Coronary angiography;  Surgeon: Joel Turpin MD;  Location: [facility] CATH INVASIVE LOCATION;  Service: Cardiology;  Laterality: N/A;   • CARDIOVASCULAR STRESS TEST  07/03/2017    WITH DR HERNANDEZ AT [facility]   • CARPAL TUNNEL RELEASE Right    • CATARACT EXTRACTION W/ INTRAOCULAR LENS IMPLANT Right 06/12/2017    Procedure: CATARACT PHACO EXTRACTION WITH INTRAOCULAR LENS IMPLANT RIGHT ;  Surgeon: Natalie Cao MD;  Location: [facility];  Service:    • CATARACT EXTRACTION W/ INTRAOCULAR LENS IMPLANT Left 07/10/2017    Procedure: CATARACT PHACO EXTRACTION WITH INTRAOCULAR LENS  IMPLANT LEFT;  Surgeon: Natalie Cao MD;  Location: [facility] OR;  Service:    • CHOLECYSTECTOMY     • COLONOSCOPY     • COLONOSCOPY N/A 07/02/2020    Procedure: COLONOSCOPY;  Surgeon: Petra Crowell MD;  Location: [facility] ENDOSCOPY;  Service: Gastroenterology;  Laterality: N/A;  poor prep   • CORONARY ANGIOPLASTY WITH STENT PLACEMENT      X1-LAD 2002, Coronary 2019 (x2 total)   • CORONARY ARTERY BYPASS GRAFT N/A 08/15/2017    Procedure: CORONARY ARTERY BYPASS GRAFTING x 3 UTILIZING THE LEFT INTERNAL MAMMARY ARTERY WITH ENDOSCOPIC VEIN HARVESTING OF THE RIGHT GREATER SAPHENOUS VEIN, HAMLET, LAD ENDARECTOMY;  Surgeon: London Damon MD;  Location:  [facility];  Service:    • ENDOSCOPY     • EYE CAPSULOTOMY WITH LASER Right 11/25/2019    Procedure: EYE CAPSULOTOMY WITH LASER RIGHT;  Surgeon: Natalie Cao MD;  Location: [facility] OR;  Service: Ophthalmology   • EYE CAPSULOTOMY WITH LASER Left 12/09/2019    Procedure: EYE CAPSULOTOMY WITH LASER LEFT;  Surgeon: Natalie Cao MD;  Location: [facility] OR;  Service: Ophthalmology   • EYE SURGERY      cataract surgery both eyes   • INTERVENTIONAL RADIOLOGY PROCEDURE Bilateral 12/31/2019    Procedure: Carotid Cerebral Angiogram;  Surgeon: Damian Dubois MD;  Location: [facility] CATH INVASIVE LOCATION;  Service: Interventional Radiology   • KNEE ARTHROSCOPY Right 2010    Dr Lewis   • KNEE ARTHROSCOPY Left 2008    Dr Jameson   • KNEE MENISCECTOMY Right 10/15/2020    Procedure: Right knee arthroscopy with partial medial and lateral meniscectomy and three compartment chondroplasty.;  Surgeon: South Lewis MD;  Location: [facility] OR;  Service: Orthopedics;  Laterality: Right;   • MOUTH SURGERY      complete extraction    • NEUROPLASTY / TRANSPOSITION ULNAR NERVE AT ELBOW Left    • OTHER SURGICAL HISTORY      Foraminotomy and discectomy   • PERICARDIAL WINDOW N/A 08/25/2017    Procedure: PERICARDIAL WINDOW;  Surgeon: Freeman Phillips MD;  Location:  [facility];  Service:    •  VENTRAL/INCISIONAL HERNIA REPAIR N/A 01/12/2022    Procedure: INCISIONAL HERNIA REPAIR  WITH MESH;  Surgeon: Torres Kim MD;  Location: [facility];  Service: General;  Laterality: N/A;       Allergies   Allergen Reactions   • Ajovy [Fremanezumab-Vfrm] Other (See Comments)     Caused tightness of chest, vomiting, pain in both arms.   • Sulfa Antibiotics Anaphylaxis, Nausea And Vomiting and Delirium   • Invokana [Canagliflozin] Unknown - Low Severity     DKA   • Codeine Nausea And Vomiting     Can only take with Phenergan   • Morphine Unknown - Low Severity     Does not work. It causes pain       Objective   Temp 98.1 °F (36.7 °C)   Ht 177.8 cm (70\")   Wt (!) 145 kg (320 lb)   BMI 45.92 kg/m²    Physical Exam    Skin exam stable with no erythema, ecchymosis or rash.  No new swelling.  No motor or sensory changes.  Distal pulse intact.    Large Joint Arthrocentesis: R knee  Date/Time: 1/4/2023 1:37 PM  Consent given by: patient  Site marked: site marked  Timeout: Immediately prior to procedure a time out was called to verify the correct patient, procedure, equipment, support staff and site/side marked as required   Supporting Documentation  Indications: pain   Procedure Details  Location: knee - R knee  Preparation: Patient was prepped and draped in the usual sterile fashion  Needle gauge: 21 G.  Approach: anterolateral  Medications administered: 25 mg sodium hyaluronate 25 MG/2.5ML  Patient tolerance: patient tolerated the procedure well with no immediate complications    Large Joint Arthrocentesis: L knee  Date/Time: 1/4/2023 1:37 PM  Consent given by: patient  Site marked: site marked  Timeout: Immediately prior to procedure a time out was called to verify the correct patient, procedure, equipment, support staff and site/side marked as required   Supporting Documentation  Indications: pain   Procedure Details  Location: knee - L knee  Preparation: Patient was prepped and draped in the usual sterile  fashion  Needle gauge: 21 G.  Approach: anterolateral  Medications administered: 25 mg sodium hyaluronate 25 MG/2.5ML  Patient tolerance: patient tolerated the procedure well with no immediate complications          Assessment & Plan      Diagnosis Plan   1. Primary osteoarthritis of both knees        2. Arthralgia of both knees            Discussion of orthopaedic goals and activities and patient expressed appreciation.  Guided on proper techniques for mobility, strength, agility and/or conditioning exercises  Ice, heat, and/or modalities as beneficial  Watch for signs and symptoms of infection  Call or notify for any adverse effect from injection therapy    Recommendations:  Follow up in 1 week    Patient agreeable to call or return sooner for any concerns.

## 2023-01-12 NOTE — PROGRESS NOTES
Subjective   Denzel Harding is a 62 y.o. male here today for injection therapy.    Chief Complaint   Patient presents with   • Left Knee - Injections     Supartz #5.   • Right Knee - Injections   patient presents for 5th bilateral knee visco injection    Past Medical History:   Diagnosis Date   • Anxiety    • Arthritis    • Asthma    • Brachial neuritis 01/19/2017   • Cellulitis     left arm and right leg    • Chest pain last 11/25/21    pt denies any since date of 11/25/21   • CHF (congestive heart failure) ([facility])     Patient reported history May 2020    • COPD (chronic obstructive pulmonary disease) ([facility])    • Coronary artery disease     Patient reported CABG , 3 vessel   • COVID-19 vaccine series completed     Pfizer, plus booster   • Depression    • Diabetes mellitus ([facility])     diagnosed in 1998, checks fsbg 3-4x/day   • Dizziness    • Edema    • GERD (gastroesophageal reflux disease)    • H/O chest x-ray 07/04/2015    Mild Left base atelectasis   • H/O echocardiogram 07/05/2015    Normal LVSF. EF of 60-65%. Grade 1 diastolic dysfunction of the LV myocardium. No evidence of pericardial effusion   • H/O exercise stress test    • Hearing loss     No use of hearing aids   • Heart attack (HCC) 06/07/2022   • History of fracture     Reported 2 bones in left foot and a finger   • History of herniated intervertebral disc     History of left L5-S1 disc herniation   • History of nuclear stress test 2021    x2   • History of pneumonia    • History of pneumonia    • Hyperlipidemia    • Hypertension    • Impaired functional mobility, balance, gait, and endurance    • LOM (loss of memory) 01/19/2017   • Lower back pain     Right   • Measles    • MUSE (nonalcoholic steatohepatitis)    • Neuropathy     feet    • EDD treated with BiPAP     BiPAP HS - instructed to bring mask/machine DOS (setting 13 and 5)   • Oxygen dependent     2L NC   • Palpitations    • Pericardial effusion    • Poor dentition     Patient reported  missing multiple teeth   • Renal disorder    • Seasonal allergies    • Seizure disorder (HCC)     focal seizures   • SOB (shortness of breath)     no report of any new onset or worsening of symptoms   • Staph infection     back after sugery at the incision site, on Rside face    • Stroke (HCC)     x5 most recent 03/2021, slight weakness in hands occasionaly    • Wears glasses     reading glasses         Past Surgical History:   Procedure Laterality Date   • BACK SURGERY  2015    Dr LEANNE Coe, Discectomy L5-S1   • CARDIAC CATHETERIZATION     • CARDIAC CATHETERIZATION N/A 08/11/2017    Procedure: Coronary angiography;  Surgeon: Dallas Hernandez MD;  Location: [facility] CATH INVASIVE LOCATION;  Service:    • CARDIAC CATHETERIZATION N/A 08/11/2017    Procedure: Left Heart Cath;  Surgeon: Dallas Hernandez MD;  Location: [facility] CATH INVASIVE LOCATION;  Service:    • CARDIAC CATHETERIZATION N/A 08/11/2017    Procedure: Left ventriculography;  Surgeon: Dallas Hernandez MD;  Location: [facility] CATH INVASIVE LOCATION;  Service:    • CARDIAC CATHETERIZATION      2002 x1 stent,  x1 stent   • CARDIAC CATHETERIZATION N/A 06/09/2022    Procedure: Left Heart Cath;  Surgeon: Otto Valencia MD;  Location: [facility] CATH INVASIVE LOCATION;  Service: Cardiology;  Laterality: N/A;   • CARDIAC CATHETERIZATION N/A 06/14/2022    Procedure: Coronary angiography;  Surgeon: Joel Turpin MD;  [facility] CATH INVASIVE LOCATION;  Service: Cardiology;  Laterality: N/A;   • CARDIOVASCULAR STRESS TEST  07/03/2017    WITH DR HERNANDEZ AT [facility]   • CARPAL TUNNEL RELEASE Right    • CATARACT EXTRACTION W/ INTRAOCULAR LENS IMPLANT Right 06/12/2017    Procedure: CATARACT PHACO EXTRACTION WITH INTRAOCULAR LENS IMPLANT RIGHT ;  Surgeon: Natalie Cao MD;  Location: [facility];  Service:    • CATARACT EXTRACTION W/ INTRAOCULAR LENS IMPLANT Left 07/10/2017    Procedure: CATARACT PHACO EXTRACTION WITH INTRAOCULAR LENS IMPLANT LEFT;   Surgeon: Natalie Cao MD;  Location:  [facility];  Service:    • CHOLECYSTECTOMY     • COLONOSCOPY     • COLONOSCOPY N/A 07/02/2020    Procedure: COLONOSCOPY;  Surgeon: Petra Crowell MD;  Location: [facility] ENDOSCOPY;  Service: Gastroenterology;  Laterality: N/A;  poor prep   • CORONARY ANGIOPLASTY WITH STENT PLACEMENT      X1-LAD 2002, Coronary 2019 (x2 total)   • CORONARY ARTERY BYPASS GRAFT N/A 08/15/2017    Procedure: CORONARY ARTERY BYPASS GRAFTING x 3 UTILIZING THE LEFT INTERNAL MAMMARY ARTERY WITH ENDOSCOPIC VEIN HARVESTING OF THE RIGHT GREATER SAPHENOUS VEIN, HAMLET, LAD ENDARECTOMY;  Surgeon: London Damon MD;  Location:  [facility];  Service:    • ENDOSCOPY     • EYE CAPSULOTOMY WITH LASER Right 11/25/2019    Procedure: EYE CAPSULOTOMY WITH LASER RIGHT;  Surgeon: Natalie Cao MD;  Location:  [facility];  Service: Ophthalmology   • EYE CAPSULOTOMY WITH LASER Left 12/09/2019    Procedure: EYE CAPSULOTOMY WITH LASER LEFT;  Surgeon: Natalie Cao MD;  Location:  [facility];  Service: Ophthalmology   • EYE SURGERY      cataract surgery both eyes   • INTERVENTIONAL RADIOLOGY PROCEDURE Bilateral 12/31/2019    Procedure: Carotid Cerebral Angiogram;  Surgeon: Damian Dubois MD;  Location:  [facility] CATH INVASIVE LOCATION;  Service: Interventional Radiology   • KNEE ARTHROSCOPY Right 2010    Dr Lewis   • KNEE ARTHROSCOPY Left 2008    Dr Jameson   • KNEE MENISCECTOMY Right 10/15/2020    Procedure: Right knee arthroscopy with partial medial and lateral meniscectomy and three compartment chondroplasty.;  Surgeon: South Lewis MD;  Location:  [facility];  Service: Orthopedics;  Laterality: Right;   • MOUTH SURGERY      complete extraction    • NEUROPLASTY / TRANSPOSITION ULNAR NERVE AT ELBOW Left    • OTHER SURGICAL HISTORY      Foraminotomy and discectomy   • PERICARDIAL WINDOW N/A 08/25/2017    Procedure: PERICARDIAL WINDOW;  Surgeon: Freeman Phillips MD;  Location:  [facility];  Service:    • VENTRAL/INCISIONAL  "HERNIA REPAIR N/A 01/12/2022    Procedure: INCISIONAL HERNIA REPAIR  WITH MESH;  Surgeon: Torres Kim MD;  Location: [facility];  Service: General;  Laterality: N/A;       Allergies   Allergen Reactions   • Ajovy [Fremanezumab-Vfrm] Other (See Comments)     Caused tightness of chest, vomiting, pain in both arms.   • Sulfa Antibiotics Anaphylaxis, Nausea And Vomiting and Delirium   • Invokana [Canagliflozin] Unknown - Low Severity     DKA   • Codeine Nausea And Vomiting     Can only take with Phenergan   • Morphine Unknown - Low Severity     Does not work. It causes pain       Objective   Temp 98 °F (36.7 °C)   Ht 177.8 cm (70\")   Wt (!) 145 kg (320 lb)   BMI 45.92 kg/m²    Physical Exam    Skin exam stable with no erythema, ecchymosis or rash.  No new swelling.  No motor or sensory changes.  Distal pulse intact.    Large Joint Arthrocentesis: R knee  Date/Time: 1/12/2023 1:17 PM  Consent given by: patient  Site marked: site marked  Timeout: Immediately prior to procedure a time out was called to verify the correct patient, procedure, equipment, support staff and site/side marked as required   Supporting Documentation  Indications: pain   Procedure Details  Location: knee - R knee  Preparation: Patient was prepped and draped in the usual sterile fashion  Needle size: 22 G  Approach: anterolateral  Medications administered: 25 mg sodium hyaluronate 25 MG/2.5ML  Patient tolerance: patient tolerated the procedure well with no immediate complications    Large Joint Arthrocentesis: L knee  Date/Time: 1/12/2023 1:18 PM  Consent given by: patient  Site marked: site marked  Timeout: Immediately prior to procedure a time out was called to verify the correct patient, procedure, equipment, support staff and site/side marked as required   Supporting Documentation  Indications: pain   Procedure Details  Location: knee - L knee  Preparation: Patient was prepped and draped in the usual sterile fashion  Needle size: 22 " G  Approach: anterolateral  Medications administered: 25 mg sodium hyaluronate 25 MG/2.5ML  Patient tolerance: patient tolerated the procedure well with no immediate complications          Assessment & Plan      Diagnosis Plan   1. Primary osteoarthritis of both knees            Discussion of orthopaedic goals and activities and patient expressed appreciation.  Guided on proper techniques for mobility, strength, agility and/or conditioning exercises  Ice, heat, and/or modalities as beneficial  Watch for signs and symptoms of infection  Call or notify for any adverse effect from injection therapy    Recommendations:  Follow up in 6 months  Patient agreeable to call or return sooner for any concerns.

## 2023-01-23 NOTE — ED PROVIDER NOTES
Subjective  History of Present Illness:    Chief Complaint: Dyspnea  History of Present Illness: 62-year-old male patient comes into the ED today complaining of shortness of breath that has been ongoing for the last 3 days.  Patient states he has a history of COPD and several other issues.      Nurses Notes reviewed and agree, including vitals, allergies, social history and prior medical history.       Allergies:    Ajovy [fremanezumab-vfrm], Sulfa antibiotics, Invokana [canagliflozin], Codeine, and Morphine      Past Surgical History:   Procedure Laterality Date   • BACK SURGERY  2015    Dr LEANNE Coe, Discectomy L5-S1   • CARDIAC CATHETERIZATION     • CARDIAC CATHETERIZATION N/A 08/11/2017    Procedure: Coronary angiography;  Surgeon: Dallas Hernandez MD;  Location: [facility] CATH INVASIVE LOCATION;  Service:    • CARDIAC CATHETERIZATION N/A 08/11/2017    Procedure: Left Heart Cath;  Surgeon: Dallas Hernandez MD;  Location: [facility] CATH INVASIVE LOCATION;  Service:    • CARDIAC CATHETERIZATION N/A 08/11/2017    Procedure: Left ventriculography;  Surgeon: Dallas Hernandez MD;  Location: [facility] CATH INVASIVE LOCATION;  Service:    • CARDIAC CATHETERIZATION      2002 x1 stent,  x1 stent   • CARDIAC CATHETERIZATION N/A 06/09/2022    Procedure: Left Heart Cath;  Surgeon: Otto Valencia MD;  Location: [facility] CATH INVASIVE LOCATION;  Service: Cardiology;  Laterality: N/A;   • CARDIAC CATHETERIZATION N/A 06/14/2022    Procedure: Coronary angiography;  Surgeon: Joel Turpin MD;  Location: [facility] CATH INVASIVE LOCATION;  Service: Cardiology;  Laterality: N/A;   • CARDIOVASCULAR STRESS TEST  07/03/2017    WITH DR HERNANDEZ AT [facility]   • CARPAL TUNNEL RELEASE Right    • CATARACT EXTRACTION W/ INTRAOCULAR LENS IMPLANT Right 06/12/2017    Procedure: CATARACT PHACO EXTRACTION WITH INTRAOCULAR LENS IMPLANT RIGHT ;  Surgeon: Natalie Cao MD;  Location: [facility];  Service:    • CATARACT EXTRACTION W/ INTRAOCULAR  LENS IMPLANT Left 07/10/2017    Procedure: CATARACT PHACO EXTRACTION WITH INTRAOCULAR LENS IMPLANT LEFT;  Surgeon: Natalie Cao MD;  Location: [facility] OR;  Service:    • CHOLECYSTECTOMY     • COLONOSCOPY     • COLONOSCOPY N/A 07/02/2020    Procedure: COLONOSCOPY;  Surgeon: Petra Crowell MD;  Location: [facility] ENDOSCOPY;  Service: Gastroenterology;  Laterality: N/A;  poor prep   • CORONARY ANGIOPLASTY WITH STENT PLACEMENT      X1-LAD 2002, Coronary 2019 (x2 total)   • CORONARY ARTERY BYPASS GRAFT N/A 08/15/2017    Procedure: CORONARY ARTERY BYPASS GRAFTING x 3 UTILIZING THE LEFT INTERNAL MAMMARY ARTERY WITH ENDOSCOPIC VEIN HARVESTING OF THE RIGHT GREATER SAPHENOUS VEIN, HAMLET, LAD ENDARECTOMY;  Surgeon: London Damon MD;  Location: [facility] OR;  Service:    • ENDOSCOPY     • EYE CAPSULOTOMY WITH LASER Right 11/25/2019    Procedure: EYE CAPSULOTOMY WITH LASER RIGHT;  Surgeon: Natalie Cao MD;  Location: [facility] OR;  Service: Ophthalmology   • EYE CAPSULOTOMY WITH LASER Left 12/09/2019    Procedure: EYE CAPSULOTOMY WITH LASER LEFT;  Surgeon: Natalie Cao MD;  Location: [facility] OR;  Service: Ophthalmology   • EYE SURGERY      cataract surgery both eyes   • INTERVENTIONAL RADIOLOGY PROCEDURE Bilateral 12/31/2019    Procedure: Carotid Cerebral Angiogram;  Surgeon: Damian Dubois MD;  Location: [facility] CATH INVASIVE LOCATION;  Service: Interventional Radiology   • KNEE ARTHROSCOPY Right 2010    Dr Lewis   • KNEE ARTHROSCOPY Left 2008    Dr Jameson   • KNEE MENISCECTOMY Right 10/15/2020    Procedure: Right knee arthroscopy with partial medial and lateral meniscectomy and three compartment chondroplasty.;  Surgeon: South Lewis MD;  Location: [facility] OR;  Service: Orthopedics;  Laterality: Right;   • MOUTH SURGERY      complete extraction    • NEUROPLASTY / TRANSPOSITION ULNAR NERVE AT ELBOW Left    • OTHER SURGICAL HISTORY      Foraminotomy and discectomy   • PERICARDIAL WINDOW N/A 08/25/2017    Procedure:  PERICARDIAL WINDOW;  Surgeon: Freeman Phillips MD;  Location: [facility] OR;  Service:    • VENTRAL/INCISIONAL HERNIA REPAIR N/A 2022    Procedure: INCISIONAL HERNIA REPAIR  WITH MESH;  Surgeon: Torres Kim MD;  Location: [facility] OR;  Service: General;  Laterality: N/A;         Social History     Socioeconomic History   • Marital status:    • Number of children: 1   Tobacco Use   • Smoking status: Former     Packs/day: 1.00     Years: 10.00     Pack years: 10.00     Types: Cigarettes     Quit date: 1998     Years since quittin.0   • Smokeless tobacco: Former     Types: Snuff     Quit date:    Vaping Use   • Vaping Use: Never used   Substance and Sexual Activity   • Alcohol use: Yes     Comment: 3 drinks a year   • Drug use: No   • Sexual activity: Defer         Family History   Problem Relation Age of Onset   • Hypertension Mother    • Arthritis Mother    • Stomach cancer Mother    • Alcohol abuse Father    • Heart disease Other    • Hypertension Other    • Other Other         Neurologic disorder   • Heart attack Other    • Parkinsonism Other    • Stroke Other    • Heart disease Other    • Hypertension Other    • Heart disease Other    • Stroke Other    • Hypertension Other    • Colon cancer Neg Hx        REVIEW OF SYSTEMS: All systems reviewed and not pertinent unless noted.    Review of Systems   Constitutional: Positive for fatigue.   HENT: Positive for congestion.    Respiratory: Positive for cough, chest tightness, shortness of breath and wheezing.    Cardiovascular: Positive for chest pain and leg swelling.   Gastrointestinal: Positive for nausea.   All other systems reviewed and are negative.      Objective    Physical Exam  Vitals and nursing note reviewed.   Constitutional:       Appearance: Normal appearance.   HENT:      Head: Normocephalic and atraumatic.   Eyes:      Extraocular Movements: Extraocular movements intact.      Pupils: Pupils are equal, round, and reactive to light.    Cardiovascular:      Rate and Rhythm: Normal rate and regular rhythm.      Pulses: Normal pulses.      Heart sounds: Normal heart sounds.   Pulmonary:      Effort: Pulmonary effort is normal.      Breath sounds: Normal breath sounds.      Comments: Decreased in the bases, scattered wheezing throughout  Abdominal:      General: Bowel sounds are normal.      Palpations: Abdomen is soft.   Musculoskeletal:         General: Normal range of motion.      Cervical back: Normal range of motion and neck supple.   Skin:     General: Skin is warm and dry.      Capillary Refill: Capillary refill takes less than 2 seconds.   Neurological:      Mental Status: He is alert.      GCS: GCS eye subscore is 4. GCS verbal subscore is 5. GCS motor subscore is 6.      Sensory: Sensation is intact.      Motor: Motor function is intact.   Psychiatric:         Attention and Perception: Attention and perception normal.         Mood and Affect: Mood and affect normal.         Speech: Speech normal.           Procedures    ED Course:    ED Course as of 01/23/23 1528   Mon Jan 23, 2023   1400 EKG interpreted by me.  Sinus rhythm.  Rate of 76.  No obvious ST elevation, minimal ST segment depression.  Prolonged QT interval.  Abnormal EKG [CG]      ED Course User Index  [CG] Deep Avitia DO       Lab Results (last 24 hours)     Procedure Component Value Units Date/Time    Influenza Antigen, Rapid - Swab, Nasopharynx [[id number]]  (Normal) Collected: 01/23/23 1401    Specimen: Swab from Nasopharynx Updated: 01/23/23 1417     Influenza A Ag, EIA Negative     Influenza B Ag, EIA Negative    COVID-19,[facility] IN-HOUSE,Nasal Swab No Transport Media 3-4 HR TAT - Swab, Nasal Cavity [[id number]]  (Normal) Collected: 01/23/23 1401    Specimen: Swab from Nasal Cavity Updated: 01/23/23 1441     COVID19 Not Detected    Narrative:      Fact sheet for providers: https://www.fda.gov/media/976758/download     Fact sheet for patients:  https://www.fda.gov/media/856563/download    Test performed by PCR.    Consider negative results in combination with clinical observations, patient history, and epidemiological information.    CBC & Differential [[id number]]  (Abnormal) Collected: 01/23/23 1410    Specimen: Blood Updated: 01/23/23 1418    Narrative:      The following orders were created for panel order CBC & Differential.  Procedure                               Abnormality         Status                     ---------                               -----------         ------                     CBC Auto Differential[[id number]]        Abnormal            Final result                 Please view results for these tests on the individual orders.    Comprehensive Metabolic Panel [[id number]]  (Abnormal) Collected: 01/23/23 1410    Specimen: Blood Updated: 01/23/23 1437     Glucose 165 mg/dL      BUN 11 mg/dL      Creatinine 1.02 mg/dL      Sodium 136 mmol/L      Potassium 3.6 mmol/L      Chloride 96 mmol/L      CO2 31.6 mmol/L      Calcium 9.3 mg/dL      Total Protein 6.7 g/dL      Albumin 3.9 g/dL      ALT (SGPT) 21 U/L      AST (SGOT) 21 U/L      Alkaline Phosphatase 82 U/L      Total Bilirubin 0.8 mg/dL      Globulin 2.8 gm/dL      A/G Ratio 1.4 g/dL      BUN/Creatinine Ratio 10.8     Anion Gap 8.4 mmol/L      eGFR 83.6 mL/min/1.73     Narrative:      GFR Normal >60  Chronic Kidney Disease <60  Kidney Failure <15      BNP [[id number]]  (Normal) Collected: 01/23/23 1410    Specimen: Blood Updated: 01/23/23 1440     proBNP 227.5 pg/mL     Narrative:      Among patients with dyspnea, NT-proBNP is highly sensitive for the detection of acute congestive heart failure. In addition NT-proBNP of <300 pg/ml effectively rules out acute congestive heart failure with 99% negative predictive value.    Results may be falsely decreased if patient taking Biotin.      Troponin [[id number]]  (Normal) Collected: 01/23/23 1410    Specimen: Blood Updated: 01/23/23 1440      "Troponin T 0.017 ng/mL     Narrative:      Troponin T Reference Range:  <= 0.03 ng/mL-   Negative for AMI  >0.03 ng/mL-     Abnormal for myocardial necrosis.  Clinicians would have to utilize clinical acumen, EKG, Troponin and serial changes to determine if it is an Acute Myocardial Infarction or myocardial injury due to an underlying chronic condition.       Results may be falsely decreased if patient taking Biotin.      Lactic Acid, Plasma [[id number]]  (Normal) Collected: 01/23/23 1410    Specimen: Blood Updated: 01/23/23 1436     Lactate 1.6 mmol/L     Procalcitonin [[id number]]  (Normal) Collected: 01/23/23 1410    Specimen: Blood Updated: 01/23/23 1446     Procalcitonin 0.10 ng/mL     Narrative:      As a Marker for Sepsis (Non-Neonates):    1. <0.5 ng/mL represents a low risk of severe sepsis and/or septic shock.  2. >2 ng/mL represents a high risk of severe sepsis and/or septic shock.    As a Marker for Lower Respiratory Tract Infections that require antibiotic therapy:    PCT on Admission    Antibiotic Therapy       6-12 Hrs later    >0.5                Strongly Recommended  >0.25 - <0.5        Recommended   0.1 - 0.25          Discouraged              Remeasure/reassess PCT  <0.1                Strongly Discouraged     Remeasure/reassess PCT    As 28 day mortality risk marker: \"Change in Procalcitonin Result\" (>80% or <=80%) if Day 0 (or Day 1) and Day 4 values are available. Refer to http://www.[employer]-pct-calculator.com    Change in PCT <=80%  A decrease of PCT levels below or equal to 80% defines a positive change in PCT test result representing a higher risk for 28-day all-cause mortality of patients diagnosed with severe sepsis for septic shock.    Change in PCT >80%  A decrease of PCT levels of more than 80% defines a negative change in PCT result representing a lower risk for 28-day all-cause mortality of patients diagnosed with severe sepsis or septic shock.       Magnesium [[id number]]  (Normal) " Collected: 01/23/23 1410    Specimen: Blood Updated: 01/23/23 1437     Magnesium 2.1 mg/dL     CBC Auto Differential [[id number]]  (Abnormal) Collected: 01/23/23 1410    Specimen: Blood Updated: 01/23/23 1418     WBC 9.24 10*3/mm3      RBC 4.82 10*6/mm3      Hemoglobin 12.4 g/dL      Hematocrit 38.3 %      MCV 79.5 fL      MCH 25.7 pg      MCHC 32.4 g/dL      RDW 13.9 %      RDW-SD 39.9 fl      MPV 10.1 fL      Platelets 201 10*3/mm3      Neutrophil % 74.6 %      Lymphocyte % 13.7 %      Monocyte % 5.8 %      Eosinophil % 4.1 %      Basophil % 0.6 %      Immature Grans % 1.2 %      Neutrophils, Absolute 6.88 10*3/mm3      Lymphocytes, Absolute 1.27 10*3/mm3      Monocytes, Absolute 0.54 10*3/mm3      Eosinophils, Absolute 0.38 10*3/mm3      Basophils, Absolute 0.06 10*3/mm3      Immature Grans, Absolute 0.11 10*3/mm3      nRBC 0.0 /100 WBC            XR Chest 1 View    Result Date: 1/23/2023  PROCEDURE: XR CHEST 1 VW-  HISTORY: Dyspnea , shortness of breath  COMPARISON:  03/19/2022  FINDINGS:  Portable view of the chest demonstrates the lungs to be grossly clear. There is no evidence of effusion, pneumothorax or other significant pleural disease.  Patient status post CABG.  The heart size is normal.      Impression: No acute cardiopulmonary disease  This report was signed and finalized on 1/23/2023 3:08 PM by Phoenix Bella MD.       Medical Decision Making  62-year-old male patient comes into the ED complaining of shortness of breath, cough congestion nausea.  Physical examination patient is wearing 2 L nasal cannula which he wears all the time, has decreased breath sounds bilateral bases, scattered wheezing throughout heart is normal sinus rhythm positive pulses.    DDX: includes but is not limited to: COPD exacerbation, pneumonia, COVID-19, upper respiratory illness    COPD exacerbation (HCC): acute illness or injury  Amount and/or Complexity of Data Reviewed  External Data Reviewed: labs, radiology and ECG.  Labs:  ordered. Decision-making details documented in ED Course.     Details: Labs within normal limits  Radiology: ordered. Decision-making details documented in ED Course.  ECG/medicine tests: ordered. Decision-making details documented in ED Course.  Discussion of management or test interpretation with external provider(s): Discussed assessment, treatment and plan with ER attending    Risk  Prescription drug management.    Risk Details: Patient will be discharged home with diagnosis of COPD exacerbation.  Patient will be sent home with an antibiotic and steroids for period of 5 days patient is requested to follow-up with primary care provider and pulmonology within the next week for reevaluation                  Final diagnoses:   COPD exacerbation (HCC)        Rd Lomas, APRCURT  01/23/23 1529

## 2023-01-25 NOTE — PROGRESS NOTES
[facility] at [facility]  Office visit  Denzel Harding  1960  [phone]    VISIT DATE:  1/25/2023      PCP: Lynnette Perez, PA  [address] [place] [address]    CC:  Chief Complaint   Patient presents with   • Chronic diastolic CHF (congestive heart failure) (HCC)       PROBLEM LIST:  1. Post op pericarditis with effusion s/p left anterior thoracotomy and pericardial window 8/25/17  2. CABG x 3, LAD endarterectomy 8/15/17 Dr. Damon, Normal EF, DHF  3. HTN: Controlled, BB and Norvasc  4. CVA: Remote  5. DM  6. COPD/EDD  7. HLD    Previous cardiac studies and procedures:  August 2017 cardiac catheterization  · Severe three-vessel coronary artery disease  · Preserved global and regional left ventricular systolic function  · Elevated left ventricular filling pressures consistent with diastolic heart failure.  · No significant left-sided valvular abnormalities appreciated    December 2017  Echo  · Left ventricular systolic function is normal.  · Left ventricular diastolic dysfunction (grade I a) consistent with impaired relaxation.  · Left ventricular wall thickness is consistent with mild concentric hypertrophy.  Carotid duplex  · Proximal right internal carotid artery plaque without significant stenosis.  · Right internal carotid artery stenosis of 0-49%.  · Proximal left internal carotid artery plaque without significant stenosis.  · Left internal carotid artery stenosis of 0-49%.    December 2018 myocardial perfusion imaging  · Left ventricular ejection fraction is borderline normal (Calculated EF = 50%).  · Myocardial perfusion imaging indicates a normal myocardial perfusion study with no evidence of ischemia.    6/2019 echo  Technically very limited study   1) Mild LVH with normal LV systolic function ( EF is over 55%)  2) Mild left atrial enlargement   3) Trace MR and TR   4) Normal RV function     January 2021  Transthoracic echo  1.  Technically difficult study.  2.  Normal left  ventricular size and systolic function, LVEF grossly 55-60%.  3.  Normal LV diastolic filling pattern.  4.  Normal right ventricular size and systolic function.  5.  Severely increased left atrial volume index.  6.  Trace mitral regurgitation.  Kayy scan myocardial perfusion imaging  · Left ventricular ejection fraction is normal. (Calculated EF = 57%).  · Myocardial perfusion imaging indicates a normal myocardial perfusion study with no evidence of ischemia.    March 2021  TTE  1.  Technically difficult study.   2.  Normal left ventricular size and systolic function, LVEF 55-60%.  3.  Indeterminate LV diastolic filling pattern.  4.  Normal right ventricular size and systolic function.  5.  Moderately increased left atrial volume index.  6.  Mild calcification of the aortic valve without significant stenosis.  7.  Mild concentric LVH.  HAMLET  · Estimated left ventricular EF = 60% Left ventricular ejection fraction appears to be 56 - 60%. Left ventricular systolic function is normal.  · Saline test results are negative.  · The right atrial cavity is small in size.  · There is moderate calcification of the aortic valve mainly affecting the non-coronary cusp(s).    June 2022  TTE  · Left ventricular ejection fraction appears to be 61 - 65%. Left ventricular systolic function is normal.  · Left ventricular diastolic function is consistent with (grade II w/high LAP) pseudonormalization.  · This is a technically limited study.  Myocardial perfusion imaging  · Left ventricular ejection fraction is borderline normal. (Calculated EF = 50%).  · Impressions are consistent with an intermediate risk study.  · There is no prior study available for comparison.  · Mild fixed mid anterior wall defect with significant mid anterior, distal anterior, apical, apical apical septal walls ischemia, TID 1.13.  · Findings consistent with a normal ECG stress test.  Cardiac catheterization tonight  · Mid LAD to Dist LAD lesion is 75% stenosed  beyond th anastomosis of LIMA. There is heavy calcification in this vessel and not a good target for intervention  · Mid LAD lesion is 100% stenosed.  · 1st Diag lesion is 100% stenosed.  · Dist RCA lesion is 60% stenosed in a large non-grafted vessel  · There is a single vein graft to OM with a jump to Diagonal. OM is patent with good flow and Diagonal limb is patent but distal vessel is diffusely diseased and occluded.  · BASURTO to LAD is a good looking graft which is widely patent but beyond anastomosis there is heavy disease.  · No good options. Recommend medical management.  · No good targets for redo or intervention.  Cardiac catheterization June 14  1. iFR of RCA - 0.7   2. PCI of RCA with 3.25 x 23 mm LELE    November 2022 TTE   Ejection fraction is visually estimated to be 65-70 %.    There is mild concentric left ventricular hypertrophy.    Diastolic filling parameters suggests grade II diastolic dysfunction .         ASSESSMENT:   Diagnosis Plan   1. Chronic diastolic CHF (congestive heart failure) (HCC)        2. Coronary arteriosclerosis in native artery        3. Essential hypertension        4. Hypercholesterolemia            PLAN:  Coronary artery disease: Currently stable asymptomatic.  Continue current medical therapy.    History of postop pericarditis status post pericardial window    Hypertension: goal less than 130/80.  Continue current medical therapy.    Hyperlipidemia: Continue high intensity statin therapy, goal LDL less than 70.    Heart failure with preserved ejection fraction, acute on chronic exacerbation: Continue Bumex 1 mg p.o. twice daily.  Continue as needed metolazone.  Continue Jardiance 10 mg p.o. daily.  Borderline pressures limiting further aggressive titration of medical therapy.    Subjective  No lower extremity edema.  No PND orthopnea.  Evaluated 2 days ago the emergency department for wheezing and shortness of breath.  Diagnosed with COPD exacerbation and started on  "prednisone burst and course of antibiotics, now nearly back to previous baseline with regard to breathing.    PHYSICAL EXAMINATION:  Vitals:    01/25/23 1354   BP: 122/62   BP Location: Left arm   Patient Position: Sitting   Cuff Size: Adult   Pulse: 81   SpO2: 92%   Weight: (!) 143 kg (315 lb)   Height: 177.8 cm (70\")     General Appearance:    Alert, cooperative, no distress, appears stated age   Head:    Normocephalic, without obvious abnormality, atraumatic   Eyes:    conjunctiva/corneas clear   Nose:   Nares normal, septum midline, mucosa normal, no drainage   Throat:   Lips, teeth and gums normal   Neck:   Supple, symmetrical, trachea midline, no carotid    bruit or JVD   Lungs:    Diminished at the bases bilaterally, respirations unlabored   Chest Wall:    No tenderness or deformity    Heart:    Regular rate and rhythm, S1 and S2 normal, no murmur, rub   or gallop, normal carotid impulse bilaterally without bruit.   Abdomen:     Soft, non-tender   Extremities:   Extremities normal, atraumatic, no cyanosis, trivial bilateral pretibial edema   Pulses:   2+ and symmetric all extremities   Skin:   Skin color, texture, turgor normal, no rashes or lesions       Diagnostic Data:  Procedures  Lab Results   Component Value Date    CHLPL 123 07/01/2021    TRIG 111 06/14/2022    HDL 30 (L) 06/14/2022     Lab Results   Component Value Date    GLUCOSE 165 (H) 01/23/2023    BUN 11 01/23/2023    CREATININE 1.02 01/23/2023     01/23/2023    K 3.6 01/23/2023    CL 96 (L) 01/23/2023    CO2 31.6 (H) 01/23/2023     Lab Results   Component Value Date    HGBA1C 6.80 (H) 06/08/2022     Lab Results   Component Value Date    WBC 9.24 01/23/2023    HGB 12.4 (L) 01/23/2023    HCT 38.3 01/23/2023     01/23/2023       Allergies  Allergies   Allergen Reactions   • Ajovy [Fremanezumab-Vfrm] Other (See Comments)     Caused tightness of chest, vomiting, pain in both arms.   • Sulfa Antibiotics Anaphylaxis, Nausea And Vomiting " and Delirium   • Invokana [Canagliflozin] Unknown - Low Severity     DKA   • Codeine Nausea And Vomiting     Can only take with Phenergan   • Morphine Unknown - Low Severity     Does not work. It causes pain       Current Medications    Current Outpatient Medications:   •  albuterol sulfate  (90 Base) MCG/ACT inhaler, Inhale 2 puffs 4 (Four) Times a Day., Disp: 8.5 g, Rfl: 2  •  ARIPiprazole (ABILIFY) 5 MG tablet, Take 1 tablet by mouth every night at bedtime., Disp: , Rfl:   •  aspirin 81 MG EC tablet, Take 81 mg by mouth Daily., Disp: , Rfl:   •  atorvastatin (LIPITOR) 80 MG tablet, Take 1 tablet by mouth Every Night., Disp: 90 tablet, Rfl: 1  •  azithromycin (Zithromax Z-Kodi) 250 MG tablet, Take 2 tablets by mouth on day 1, then 1 tablet daily on days 2-5, Disp: 6 tablet, Rfl: 0  •  budesonide-formoterol (Symbicort) 80-4.5 MCG/ACT inhaler, Inhale 2 puffs 2 (Two) Times a Day. Rinse mouth with water after use., Disp: 30.6 g, Rfl: 1  •  bumetanide (BUMEX) 1 MG tablet, TAKE 1 TABLET BY MOUTH TWICE DAILY, Disp: 60 tablet, Rfl: 2  •  cephalexin (KEFLEX) 500 MG capsule, , Disp: , Rfl:   •  clopidogrel (PLAVIX) 75 MG tablet, Take 1 tablet by mouth Daily., Disp: 90 tablet, Rfl: 1  •  coenzyme Q10 100 MG capsule, Take 100 mg by mouth Daily., Disp: , Rfl:   •  cyanocobalamin 1000 MCG/ML injection, INJECT 1 ML UNDER THE SKIN EVERY WEEK FOR 4 WEEKS AND THEN MONTHLY, Disp: , Rfl:   •  Dupixent 300 MG/2ML solution prefilled syringe, , Disp: , Rfl:   •  empagliflozin (Jardiance) 10 MG tablet tablet, Take 1 tablet by mouth Daily., Disp: 90 tablet, Rfl: 5  •  erythromycin (ROMYCIN) 5 MG/GM ophthalmic ointment, , Disp: , Rfl:   •  gabapentin (NEURONTIN) 600 MG tablet, Take 1 tablet by mouth 3 (Three) Times a Day., Disp: 270 tablet, Rfl: 3  •  HYDROmorphone (DILAUDID) 2 MG tablet, Take 1 tablet by mouth As Needed., Disp: , Rfl:   •  HYDROmorphone (DILAUDID) 4 MG tablet, Take 4 mg by mouth Daily As Needed (migraine)., Disp: ,  Rfl:   •  hydrOXYzine (ATARAX) 25 MG tablet, Take 1 tablet by mouth 2 (Two) Times a Day As Needed., Disp: , Rfl:   •  Insulin Regular Human, Conc, (HumuLIN R) 500 UNIT/ML solution pen-injector CONCENTRATED injection, Inject 180 Units under the skin into the appropriate area as directed Daily With Lunch. Take 190 units in am, 180 units in afternoon and 180 units in the evening., Disp: , Rfl:   •  ipratropium-albuterol (DUO-NEB) 0.5-2.5 mg/3 ml nebulizer, Take 3 mL by nebulization 4 (Four) Times a Day As Needed for Wheezing or Shortness of Air., Disp: 360 mL, Rfl: 1  •  isosorbide mononitrate (IMDUR) 120 MG 24 hr tablet, Take 1 tablet by mouth Daily for 30 days., Disp: 90 tablet, Rfl: 3  •  lamoTRIgine (LaMICtal) 100 MG tablet, Take 50 mg by mouth Every Evening., Disp: , Rfl:   •  levETIRAcetam (KEPPRA) 1000 MG tablet, Take 1,000 mg by mouth 2 (Two) Times a Day., Disp: , Rfl:   •  levocetirizine (XYZAL) 5 MG tablet, Take 1 tablet by mouth Every Evening., Disp: 90 tablet, Rfl: 3  •  lisinopril (PRINIVIL,ZESTRIL) 5 MG tablet, Take 5 mg by mouth every night at bedtime., Disp: , Rfl:   •  metoprolol succinate XL (TOPROL-XL) 200 MG 24 hr tablet, Take 1 tablet by mouth Daily, Disp: 90 tablet, Rfl: 1  •  nitroglycerin (NITROLINGUAL) 0.4 MG/SPRAY spray, SPRAY 1 SPRAY UNDER THE TONGUE EVERY 5 MINUTES AS NEEDED FOR CHEST PAIN, Disp: 4.9 g, Rfl: 1  •  O2 (OXYGEN), Inhale 2 L/min As Needed., Disp: , Rfl:   •  omeprazole (priLOSEC) 20 MG capsule, Take 1 capsule by mouth Daily., Disp: , Rfl:   •  Ozempic, 1 MG/DOSE, 4 MG/3ML solution pen-injector, INJECT 1 MILLIGRAM UNDER THE SKIN ONCE EVERY WEEK ON SUNDAYS, Disp: , Rfl:   •  potassium chloride (K-DUR,KLOR-CON) 20 MEQ CR tablet, Take 1 tablet by mouth Daily As Needed (take with lasix)., Disp: 30 tablet, Rfl: 5  •  predniSONE (DELTASONE) 20 MG tablet, Take 1 tablet by mouth 2 (Two) Times a Day for 5 days., Disp: 10 tablet, Rfl: 0  •  ranolazine (RANEXA) 500 MG 12 hr tablet, TAKE 1  TABLET BY MOUTH TWICE DAILY, Disp: 60 tablet, Rfl: 11  •  spironolactone (ALDACTONE) 25 MG tablet, TAKE 1 TABLT BY MOUTH ONCE DAILY, Disp: 90 tablet, Rfl: 1  •  Tiotropium Bromide Monohydrate (Spiriva Respimat) 1.25 MCG/ACT aerosol solution inhaler, Inhale 2 puffs Daily., Disp: 12 g, Rfl: 1  •  tiZANidine (ZANAFLEX) 4 MG tablet, Take 8 mg by mouth At Night As Needed for Muscle Spasms., Disp: , Rfl:   •  traZODone (DESYREL) 50 MG tablet, Take 25 mg by mouth At Night As Needed., Disp: , Rfl:   •  vitamin C (ASCORBIC ACID) 500 MG tablet, Take 500 mg by mouth Daily., Disp: , Rfl:   •  vitamin D3 125 MCG (5000 UT) capsule capsule, Take 5,000 Units by mouth Daily., Disp: , Rfl:   •  vitamin E 400 UNIT capsule, Take 1 capsule by mouth 2 (two) times a day., Disp: 60 capsule, Rfl: 5  •  Vortioxetine HBr (Trintellix) 20 MG tablet, Take 20 mg by mouth Daily., Disp: , Rfl:   •  zafirlukast (ACCOLATE) 20 MG tablet, TAKE 1 TABLET BY MOUTH TWICE DAILY, Disp: 180 tablet, Rfl: 1          ROS  ROS    SOCIAL HX  Social History     Socioeconomic History   • Marital status:    • Number of children: 1   Tobacco Use   • Smoking status: Former     Packs/day: 1.00     Years: 10.00     Pack years: 10.00     Types: Cigarettes     Quit date: 1998     Years since quittin.0   • Smokeless tobacco: Former     Types: Snuff     Quit date:    Vaping Use   • Vaping Use: Never used   Substance and Sexual Activity   • Alcohol use: Yes     Comment: 3 drinks a year   • Drug use: No   • Sexual activity: Defer       FAMILY HX  Family History   Problem Relation Age of Onset   • Hypertension Mother    • Arthritis Mother    • Stomach cancer Mother    • Alcohol abuse Father    • Heart disease Other    • Hypertension Other    • Other Other         Neurologic disorder   • Heart attack Other    • Parkinsonism Other    • Stroke Other    • Heart disease Other    • Hypertension Other    • Heart disease Other    • Stroke Other    • Hypertension  Other    • Colon cancer Neg Hx              London Jiménez III, MD, FACC

## 2023-02-21 NOTE — PROGRESS NOTES
Patient notified referral faxed  to [phone] as requested Subjective   Patient ID: Denzel Harding is a 59 y.o. right hand dominant male  Pain of the Left Knee and Pain of the Right Knee         History of Present Illness    Patient presents as a new patient with complaints of bilateral knee pain.  He denies injury or trauma.  Denies redness or warmth.  He states the pain is worse in the morning.  He has tried over-the-counter analgesics with no improvement  Pain Score: 7  Pain Location: Knee  Pain Orientation: Right, Left(right greater than left)     Pain Descriptors: Aching, Stabbing  Pain Frequency: Constant/continuous  Pain Onset: Ongoing     Clinical Progression: Gradually worsening  Aggravating Factors: Walking, Bending, Standing        Pain Intervention(s): Rest, Home medication  Result of Injury: No       Past Medical History:   Diagnosis Date   • Anxiety    • Arthritis    • Asthma    • BiPAP (biphasic positive airway pressure) dependence    • Brachial neuritis 1/19/2017   • Chest pain    • CHF (congestive heart failure) (CMS/[facility])    • COPD (chronic obstructive pulmonary disease) (CMS/[facility])    • Coronary artery disease    • Depression    • Diabetes mellitus (CMS/[facility])    • Dizziness    • Edema    • GERD (gastroesophageal reflux disease)    • H/O chest x-ray 07/04/2015    Mild Left base atelectasis   • H/O echocardiogram 07/05/2015    Normal LVSF. EF of 60-65%. Grade 1 diastolic dysfunction of the LV myocardium. No evidence of pericardial effusion   • H/O exercise stress test    • History of herniated intervertebral disc     History of left L5-S1 disc herniation   • Hypertension    • LOM (loss of memory) 1/19/2017   • Lower back pain     Right   • Measles    • Obstructive sleep apnea    • Palpitations    • Pericardial effusion    • Renal disorder    • Seizure disorder (CMS/[facility])    • Seizures (CMS/[facility])     FOCAL    • Shortness of breath 1/19/2017   • SOB (shortness of breath)    • Stroke (CMS/[facility])    • Wears glasses         Past Surgical History:   Procedure Laterality  Date   • BACK SURGERY     • CARDIAC CATHETERIZATION     • CARDIAC CATHETERIZATION N/A 8/11/2017    Procedure: Coronary angiography;  Surgeon: Dallas Hernandez MD;  Location: [facility] CATH INVASIVE LOCATION;  Service:    • CARDIAC CATHETERIZATION N/A 8/11/2017    Procedure: Left Heart Cath;  Surgeon: Dallas Hernandez MD;  Location: [facility] CATH INVASIVE LOCATION;  Service:    • CARDIAC CATHETERIZATION N/A 8/11/2017    Procedure: Left ventriculography;  Surgeon: Dallas Hernandez MD;  Location: [facility] CATH INVASIVE LOCATION;  Service:    • CARDIOVASCULAR STRESS TEST  07/03/2017    WITH DR HERNANDEZ AT [facility]   • CARPAL TUNNEL RELEASE Right    • CATARACT EXTRACTION W/ INTRAOCULAR LENS IMPLANT Right 6/12/2017    Procedure: CATARACT PHACO EXTRACTION WITH INTRAOCULAR LENS IMPLANT RIGHT ;  Surgeon: Natalie Cao MD;  Location: [facility] OR;  Service:    • CATARACT EXTRACTION W/ INTRAOCULAR LENS IMPLANT Left 7/10/2017    Procedure: CATARACT PHACO EXTRACTION WITH INTRAOCULAR LENS IMPLANT LEFT;  Surgeon: Natalie Cao MD;  Location: [facility] OR;  Service:    • CHOLECYSTECTOMY     • CHOLECYSTECTOMY     • COLONOSCOPY     • CORONARY ANGIOPLASTY WITH STENT PLACEMENT     • CORONARY ANGIOPLASTY WITH STENT PLACEMENT      X1-LAD   • CORONARY ARTERY BYPASS GRAFT N/A 8/15/2017    Procedure: CORONARY ARTERY BYPASS GRAFTING x 3 UTILIZING THE LEFT INTERNAL MAMMARY ARTERY WITH ENDOSCOPIC VEIN HARVESTING OF THE RIGHT GREATER SAPHENOUS VEIN, HAMLET, LAD ENDARECTOMY;  Surgeon: London Damon MD;  Location: [facility] OR;  Service:    • ENDOSCOPY     • EYE SURGERY      cataract surgery both eyes   • KNEE ARTHROSCOPY Bilateral    • KNEE ARTHROSCOPY Right 2010    Dr Lewis   • KNEE ARTHROSCOPY Left 2008    Dr Jameson   • NEUROPLASTY / TRANSPOSITION ULNAR NERVE AT ELBOW     • OTHER SURGICAL HISTORY      Foraminotomy and discectomy   • PERICARDIAL WINDOW N/A 8/25/2017    Procedure: PERICARDIAL WINDOW;  Surgeon: Freeman Phillips MD;  Location: [facility] OR;  Service:         Family History   Problem Relation Age of Onset   • Hypertension Mother    • Arthritis Mother    • Alcohol abuse Father    • Heart disease Other    • Stroke Other    • Hypertension Other    • Other Other         Neurologic disorder   • Parkinsonism Other    • Stroke Other    • Heart disease Other    • Hypertension Other    • Heart disease Other    • Stroke Other    • Hypertension Other        Social History     Socioeconomic History   • Marital status:      Spouse name: Not on file   • Number of children: 1   • Years of education: Not on file   • Highest education level: Not on file   Occupational History   • Occupation: [employer]     Employer: DISABLED     Comment: Disabled since -Back Problems   Tobacco Use   • Smoking status: Former Smoker     Packs/day: 1.00     Years: 10.00     Pack years: 10.00     Types: Cigarettes     Last attempt to quit: 1998     Years since quittin.7   • Smokeless tobacco: Former User     Types: Snuff     Quit date:    Substance and Sexual Activity   • Alcohol use: Yes     Frequency: Monthly or less     Drinks per session: 3 or 4     Comment: 3 PER YEAR   • Drug use: No   • Sexual activity: Defer   Social History Narrative    Caffeine use: 0-1 serving daily.    Patient lives at home with wife.          Current Outpatient Medications:   •  acetaminophen (TYLENOL) 325 MG tablet, Take 2 tablets by mouth Every 4 (Four) Hours As Needed for Mild Pain ., Disp: , Rfl:   •  albuterol sulfate  (90 Base) MCG/ACT inhaler, Inhale 2 puffs Every 6 (Six) Hours As Needed for Wheezing or Shortness of Air., Disp: 1 inhaler, Rfl: 4  •  amitriptyline (ELAVIL) 25 MG tablet, TAKE 3 TABLETS BY MOUTH EVERY NIGHT, Disp: 90 tablet, Rfl: 11  •  amLODIPine (NORVASC) 5 MG tablet, TAKE 1/2 TABLET BY MOUTH DAILY, Disp: 45 tablet, Rfl: 3  •  ASMANEX  MCG/ACT aerosol, Inhale 1 puff 2 (Two) Times a Day. (Patient taking differently: Inhale 2 puffs 2 (Two) Times  a Day.), Disp: 1 inhaler, Rfl: 5  •  aspirin 325 MG tablet, Take 1 tablet by mouth Daily., Disp: , Rfl:   •  atorvastatin (LIPITOR) 80 MG tablet, Take 1 tablet by mouth Every Night., Disp: 30 tablet, Rfl: 0  •  azelastine (ASTELIN) 0.1 % nasal spray, 1 spray into the nostril(s) as directed by provider 2 (Two) Times a Day As Needed for Rhinitis or Allergies. Use in each nostril as directed, Disp: 1 each, Rfl: 5  •  budesonide-formoterol (SYMBICORT) 80-4.5 MCG/ACT inhaler, Inhale 2 puffs 2 (Two) Times a Day. Rinse mouth with water after use., Disp: 1 inhaler, Rfl: 5  •  bumetanide (BUMEX) 2 MG tablet, Take 1 tablet by mouth Daily. Take extra 2mg as needed for 3lb wt gain in 24hrs, increased shortness of breath, Disp: 60 tablet, Rfl: 5  •  coenzyme Q10 100 MG capsule, Take 100 mg by mouth Daily., Disp: , Rfl:   •  cyclobenzaprine (FLEXERIL) 10 MG tablet, Take 1 tablet by mouth 3 (Three) Times a Day As Needed for Muscle Spasms., Disp: 15 tablet, Rfl: 0  •  CYCLOSET 0.8 MG tablet, Take 3.2 mg by mouth Every Morning., Disp: , Rfl: 0  •  flunisolide (NASALIDE) 25 MCG/ACT (0.025%) solution nasal spray, 2 sprays 2 (Two) Times a Day., Disp: , Rfl: 0  •  gabapentin (NEURONTIN) 600 MG tablet, Take 600 mg by mouth 2 (Two) Times a Day., Disp: , Rfl: 0  •  glucose blood test strip, Use as instructed, Disp: 100 each, Rfl: 12  •  HUMALOG KWIKPEN 100 UNIT/ML solution pen-injector, Inject 10 Units under the skin 3 (Three) Times a Day With Meals. Follows SSI From PCP (Patient taking differently: Inject 50 Units under the skin into the appropriate area as directed 3 (Three) Times a Day With Meals. Follows SSI From PCP), Disp: , Rfl: 0  •  HYDROcodone-acetaminophen (NORCO) 5-325 MG per tablet, Take 1 tablet by mouth Every 6 (Six) Hours As Needed for Severe Pain ., Disp: 12 tablet, Rfl: 0  •  ipratropium-albuterol (DUO-NEB) 0.5-2.5 mg/3 ml nebulizer, Take 3 mL by nebulization 4 (Four) Times a Day As Needed for Wheezing or Shortness of  Air., Disp: 360 mL, Rfl: 5  •  levocetirizine (XYZAL) 5 MG tablet, Take 5 mg by mouth Every Evening., Disp: , Rfl: 0  •  lisinopril (PRINIVIL,ZESTRIL) 20 MG tablet, Take 20 mg by mouth Daily., Disp: , Rfl:   •  meclizine (ANTIVERT) 25 MG tablet, Take 1 tablet by mouth 3 (Three) Times a Day As Needed for dizziness., Disp: 10 tablet, Rfl: 0  •  metoprolol tartrate (LOPRESSOR) 50 MG tablet, take 1 tablet by mouth twice a day, Disp: 180 tablet, Rfl: 3  •  ondansetron ODT (ZOFRAN-ODT) 4 MG disintegrating tablet, Take 1 tablet by mouth Every 6 (Six) Hours As Needed for Nausea., Disp: 20 tablet, Rfl: 0  •  pantoprazole (PROTONIX) 40 MG EC tablet, Take 1 tablet by mouth Every Night., Disp: 30 tablet, Rfl: 0  •  potassium chloride (K-DUR,KLOR-CON) 20 MEQ CR tablet, Take 1 tablet by mouth Daily., Disp: 8 tablet, Rfl: 0  •  ranolazine (RANEXA) 500 MG 12 hr tablet, take 1 tablet by mouth twice a day, Disp: 180 tablet, Rfl: 3  •  spironolactone (ALDACTONE) 25 MG tablet, Take 1 tablet by mouth Daily., Disp: 30 tablet, Rfl: 2  •  Tiotropium Bromide Monohydrate (SPIRIVA RESPIMAT) 1.25 MCG/ACT aerosol solution inhaler, Inhale 2 puffs Daily., Disp: 1 inhaler, Rfl: 5  •  TOUJEO SOLOSTAR 300 UNIT/ML solution pen-injector, Inject 60 Units as directed Daily. (Patient taking differently: Inject 160 Units as directed Every Night.), Disp: , Rfl: 0  •  TURMERIC PO, Take 500 mg by mouth 2 (Two) Times a Day., Disp: , Rfl:   •  VICTOZA 18 MG/3ML solution pen-injector injection, Inject 0.6 mg under the skin into the appropriate area as directed Daily., Disp: , Rfl: 0  •  vitamin C (ASCORBIC ACID) 500 MG tablet, Take 500 mg by mouth Daily., Disp: , Rfl:   •  Vortioxetine HBr (TRINTELLIX) 20 MG tablet, Take 20 mg by mouth Daily., Disp: , Rfl:   •  zafirlukast (ACCOLATE) 20 MG tablet, Take 1 tablet by mouth 2 (Two) Times a Day., Disp: 60 tablet, Rfl: 5    Current Facility-Administered Medications:   •  Dupilumab solution prefilled syringe 300 mg,  "300 mg, Subcutaneous, Q14 Days, Matilde Bains APRN    Allergies   Allergen Reactions   • Sulfa Antibiotics Anaphylaxis, Nausea And Vomiting and Delirium   • Codeine Nausea And Vomiting     Can only take with Phenergan   • Invokana [Canagliflozin]      DKA   • Morphine      Does not work. It causes pain       Review of Systems   Constitutional: Negative for diaphoresis, fever and unexpected weight change.   HENT: Negative for dental problem and sore throat.    Eyes: Negative for visual disturbance.   Respiratory: Negative for shortness of breath.    Cardiovascular: Negative for chest pain.   Gastrointestinal: Negative for abdominal pain, constipation, diarrhea, nausea and vomiting.   Genitourinary: Negative for difficulty urinating and frequency.   Musculoskeletal: Positive for arthralgias (ozzy knee).   Neurological: Negative for headaches.   Hematological: Does not bruise/bleed easily.       I have reviewed the above medical and surgical history, family history, social history, medications, allergies and review of systems.    Objective   Ht 177.8 cm (70\")   BMI 45.68 kg/m²    Physical Exam   Constitutional: He is oriented to person, place, and time. He appears well-developed.   Neck: No tracheal deviation present.   Pulmonary/Chest: Effort normal.   Musculoskeletal:        Right knee: He exhibits no effusion, no ecchymosis and no erythema. Tenderness found. Medial joint line tenderness noted.        Left knee: He exhibits no swelling, no ecchymosis, no erythema and normal meniscus. Tenderness found. Medial joint line tenderness noted. No lateral joint line tenderness noted.   Neurological: He is alert and oriented to person, place, and time.   Psychiatric: He has a normal mood and affect. His behavior is normal.   Nursing note and vitals reviewed.    Right Knee Exam     Tests   Jovi:  Medial - negative Lateral - negative  Drawer:  Anterior - negative        Other   Erythema: absent  Effusion: no " effusion present      Left Knee Exam     Tests   Jovi:  Medial - negative Lateral - negative  Drawer:  Anterior - negative         Other   Erythema: absent           Extremity DVT signs are Negative on physical exam with negative Don sign, with no calf pain, with no palpable cords, with no increased pain with passive stretch/extension and with no skin tone change   Neurologic Exam     Mental Status   Oriented to person, place, and time.              Assessment/Plan   Independent Review of Radiographic Studies:    Shows no acute fracture or dislocation.      Large Joint Arthrocentesis: R knee  Date/Time: 10/9/2019 3:31 PM  Consent given by: patient  Site marked: site marked  Timeout: Immediately prior to procedure a time out was called to verify the correct patient, procedure, equipment, support staff and site/side marked as required   Supporting Documentation  Indications: pain   Procedure Details  Location: knee - R knee  Needle size: 22 G  Approach: anterolateral  Medications administered: 2 mL lidocaine 1 %; 40 mg methylPREDNISolone acetate 40 MG/ML  Patient tolerance: patient tolerated the procedure well with no immediate complications    Large Joint Arthrocentesis: L knee  Date/Time: 10/9/2019 3:37 PM  Consent given by: patient  Site marked: site marked  Timeout: Immediately prior to procedure a time out was called to verify the correct patient, procedure, equipment, support staff and site/side marked as required   Supporting Documentation  Indications: pain   Procedure Details  Location: knee - L knee  Preparation: Patient was prepped and draped in the usual sterile fashion  Needle size: 22 G  Approach: anterolateral  Medications administered: 2 mL lidocaine 1 %; 40 mg methylPREDNISolone acetate 40 MG/ML  Patient tolerance: patient tolerated the procedure well with no immediate complications             Denzel was seen today for pain and pain.    Diagnoses and all orders for this visit:    Arthralgia of  both knees  -     XR Knee 1 or 2 View Bilateral    Primary localized osteoarthritis of right knee  -     Large Joint Arthrocentesis: R knee    Primary localized osteoarthritis of left knee  -     Large Joint Arthrocentesis: L knee       Orthopedic activities reviewed and patient expressed appreciation  Discussion of orthopedic goals  Risk, benefits, and merits of treatment alternatives reviewed with the patient and questions answered    Recommendations/Plan:  Exercise, medications, injections, other patient advice, and return appointment as noted.  Patient is encouraged to call or return for any issues or concerns.    FU in 3 months-if no improvement will try to order Visco supplementation injections for bilateral knees  Patient agreeable to call or return sooner for any concerns.           EMR Dragon-transcription disclaimer:  This encounter note is an electronic transcription of spoken language to printed text.  Electronic transcription of spoken language may permit erroneous or at times nonsensical words or phrases to be inadvertently transcribed.  Although I have reviewed the note for such errors, some may still exist

## 2023-07-16 NOTE — PROGRESS NOTES
"Patient: Denzel Harding    YOB: 1960    Date: 01/26/2022    Primary Care Provider: Isaura Godoy MD    Chief Complaint   Patient presents with   • Post-op Hernia       History of present illness:  I saw the patient in the office today as a followup from their recent incarcerated incisional hernia repair.  Pathology report showed fibrovascular and adipose tissue consistent with hernia contents.  They state that they have done well and are having no complaints.    Vital Signs:   Vitals:    01/26/22 1326   BP: 126/58   Pulse: 82   Resp: 18   Temp: 98.1 °F (36.7 °C)   SpO2: 98%   Weight: (!) 137 kg (301 lb)   Height: 177.8 cm (70\")       Physical Exam:   General Appearance:    Alert, cooperative, in no acute distress   Abdomen:     no masses, no organomegaly, soft non-tender, non-distended, no guarding, wounds are well healed, no evidence of recurrent hernia     Assessment / Plan:    1. Postoperative visit        I did discuss the situation with the patient today in the office and they have done well from their recent herniorraphy.  I have released the patient back to normal activity, they understand that they need to be careful about heavy lifting.  I need to see the patient back in the office only if they are having further problems, they know to call me if they are.    Electronically signed by Torres Kim MD  01/26/22                "
<<--- Click to launch

## 2024-02-23 NOTE — PROGRESS NOTES
CC: Follow-up on chest pain and coronary disease    Interview History/HPI: Patient states his episode described by Dr. Green he remembers to be 5-6/10 in intensity, he states the GI cocktail numbed his throat but really did not help his pain.  He now rates the pain is down to 3/10.  This is in his mid chest area and more like a pressure sensation but mild at this time.    ROS:     Vitals:    06/11/22 1203   BP: 132/68   Pulse: 82   Resp: 12   Temp:    SpO2: 98%         Intake/Output Summary (Last 24 hours) at 6/11/2022 1244  Last data filed at 6/11/2022 0600  Gross per 24 hour   Intake 1040 ml   Output 3625 ml   Net -2585 ml       EXAM: Temperature is 98.4, no distress, lungs clear, heart regular rate and rhythm.  He can speak in full sentence without difficulty.      EKG: Repeat EKG last night still appeared to be fairly normal    Tele: Sinus rhythm    LABS:     Lab Results (last 48 hours)     Procedure Component Value Units Date/Time    POC Glucose Once [[id number]]  (Abnormal) Collected: 06/11/22 1052    Specimen: Blood Updated: 06/11/22 1100     Glucose 170 mg/dL      Comment: Meter: [id number] : [id number] LILLIE BONILLA       POC Glucose Once [[id number]]  (Normal) Collected: 06/11/22 0614    Specimen: Blood Updated: 06/11/22 0622     Glucose 105 mg/dL      Comment: Meter: [id number] : [id number] MAGGI FELIPE       Troponin [[id number]]  (Normal) Collected: 06/11/22 0429    Specimen: Blood Updated: 06/11/22 0509     Troponin T <0.010 ng/mL     Narrative:      Troponin T Reference Range:  <= 0.03 ng/mL-   Negative for AMI  >0.03 ng/mL-     Abnormal for myocardial necrosis.  Clinicians would have to utilize clinical acumen, EKG, Troponin and serial changes to determine if it is an Acute Myocardial Infarction or myocardial injury due to an underlying chronic condition.       Results may be falsely decreased if patient taking Biotin.      Basic Metabolic Panel [[id number]]  (Abnormal) Collected: 06/11/22  Discharge planning    Influ A +. Patient more alert today. Doing a repeat swallow study today. Palliative consulted. DNRCCA. Lives with spouse and son. Has DME including O2 2L prn thru [facility]. Will need probable SNF. Has had [facility] in the past. HD pt. US renal MWF in [place]. SW will speak with spouse on choices. PT/OT to mauri.    0326    Specimen: Blood Updated: 06/11/22 0412     Glucose 129 mg/dL      BUN 8 mg/dL      Creatinine 0.70 mg/dL      Sodium 141 mmol/L      Potassium 4.3 mmol/L      Chloride 104 mmol/L      CO2 27.7 mmol/L      Calcium 9.3 mg/dL      BUN/Creatinine Ratio 11.4     Anion Gap 9.3 mmol/L      eGFR 104.8 mL/min/1.73      Comment: National Kidney Foundation and American Society of Nephrology (ASN) Task Force recommended calculation based on the Chronic Kidney Disease Epidemiology Collaboration (CKD-EPI) equation refit without adjustment for race.       Narrative:      GFR Normal >60  Chronic Kidney Disease <60  Kidney Failure <15      CBC & Differential [[id number]]  (Abnormal) Collected: 06/11/22 0038    Specimen: Blood Updated: 06/11/22 0203    Narrative:      The following orders were created for panel order CBC & Differential.  Procedure                               Abnormality         Status                     ---------                               -----------         ------                     CBC Auto Differential[[id number]]        Abnormal            Final result               Scan Slide[[id number]]                                       Final result                 Please view results for these tests on the individual orders.    Scan Slide [[id number]] Collected: 06/11/22 0038    Specimen: Blood Updated: 06/11/22 0203     Hypochromia Slight/1+     Platelet Morphology Normal    CBC Auto Differential [[id number]]  (Abnormal) Collected: 06/11/22 0038    Specimen: Blood Updated: 06/11/22 0203     WBC 6.75 10*3/mm3      RBC 4.53 10*6/mm3      Hemoglobin 11.6 g/dL      Hematocrit 37.5 %      MCV 82.8 fL      MCH 25.6 pg      MCHC 30.9 g/dL      RDW 14.4 %      RDW-SD 42.8 fl      MPV 10.9 fL      Platelets 142 10*3/mm3      Neutrophil % 65.9 %      Lymphocyte % 22.2 %      Monocyte % 7.1 %      Eosinophil % 3.7 %      Basophil % 0.4 %      Immature Grans % 0.7 %      Neutrophils, Absolute 4.44 10*3/mm3       Lymphocytes, Absolute 1.50 10*3/mm3      Monocytes, Absolute 0.48 10*3/mm3      Eosinophils, Absolute 0.25 10*3/mm3      Basophils, Absolute 0.03 10*3/mm3      Immature Grans, Absolute 0.05 10*3/mm3      nRBC 0.0 /100 WBC     POC Glucose Once [[id number]]  (Normal) Collected: 06/11/22 0006    Specimen: Blood Updated: 06/11/22 0012     Glucose 108 mg/dL      Comment: Meter: [id number] : [id number] MAGGINetchemia       POC Glucose Once [[id number]]  (Normal) Collected: 06/10/22 2134    Specimen: Blood Updated: 06/10/22 2141     Glucose 75 mg/dL      Comment: Meter: [id number] : [id number] MAGGIFloopELL       POC Glucose Once [[id number]]  (Normal) Collected: 06/10/22 1634    Specimen: Blood Updated: 06/10/22 1641     Glucose 89 mg/dL      Comment: Meter: [id number] : [id number] ANDGogiro       POC Glucose Once [[id number]]  (Abnormal) Collected: 06/10/22 1040    Specimen: Blood Updated: 06/10/22 1051     Glucose 152 mg/dL      Comment: Meter: [id number] : [id number] [email or web address] Volumental       POC Glucose Once [[id number]]  (Normal) Collected: 06/10/22 0713    Specimen: Blood Updated: 06/10/22 0720     Glucose 76 mg/dL      Comment: Meter: [id number] : [id number] ANDGogiro       POC Glucose Once [[id number]]  (Abnormal) Collected: 06/10/22 0544    Specimen: Blood Updated: 06/10/22 0556     Glucose 68 mg/dL      Comment: Meter: [id number] : [id number] RAJ HERNANDEZ       Comprehensive Metabolic Panel [[id number]]  (Abnormal) Collected: 06/10/22 0021    Specimen: Blood Updated: 06/10/22 0058     Glucose 141 mg/dL      BUN 10 mg/dL      Creatinine 0.75 mg/dL      Sodium 138 mmol/L      Potassium 3.8 mmol/L      Comment: Slight hemolysis detected by analyzer. Results may be affected.        Chloride 104 mmol/L      CO2 22.3 mmol/L      Calcium 8.6 mg/dL      Total Protein 5.4 g/dL      Albumin 3.04 g/dL      ALT (SGPT) 37 U/L      AST (SGOT) 37 U/L      Alkaline Phosphatase 84 U/L      Total Bilirubin 0.4  mg/dL      Globulin 2.4 gm/dL      A/G Ratio 1.3 g/dL      BUN/Creatinine Ratio 13.3     Anion Gap 11.7 mmol/L      eGFR 102.7 mL/min/1.73      Comment: National Kidney Foundation and American Society of Nephrology (ASN) Task Force recommended calculation based on the Chronic Kidney Disease Epidemiology Collaboration (CKD-EPI) equation refit without adjustment for race.       Narrative:      GFR Normal >60  Chronic Kidney Disease <60  Kidney Failure <15      CBC & Differential [[id number]]  (Abnormal) Collected: 06/10/22 0021    Specimen: Blood Updated: 06/10/22 0047    Narrative:      The following orders were created for panel order CBC & Differential.  Procedure                               Abnormality         Status                     ---------                               -----------         ------                     CBC Auto Differential[[id number]]        Abnormal            Final result                 Please view results for these tests on the individual orders.    CBC Auto Differential [[id number]]  (Abnormal) Collected: 06/10/22 0021    Specimen: Blood Updated: 06/10/22 0047     WBC 7.05 10*3/mm3      RBC 4.47 10*6/mm3      Hemoglobin 11.5 g/dL      Hematocrit 37.4 %      MCV 83.7 fL      MCH 25.7 pg      MCHC 30.7 g/dL      RDW 14.5 %      RDW-SD 43.9 fl      MPV 9.8 fL      Platelets 156 10*3/mm3      Neutrophil % 63.7 %      Lymphocyte % 23.8 %      Monocyte % 7.1 %      Eosinophil % 4.1 %      Basophil % 0.7 %      Immature Grans % 0.6 %      Neutrophils, Absolute 4.49 10*3/mm3      Lymphocytes, Absolute 1.68 10*3/mm3      Monocytes, Absolute 0.50 10*3/mm3      Eosinophils, Absolute 0.29 10*3/mm3      Basophils, Absolute 0.05 10*3/mm3      Immature Grans, Absolute 0.04 10*3/mm3      nRBC 0.0 /100 WBC     POC Glucose Once [[id number]]  (Abnormal) Collected: 06/09/22 2112    Specimen: Blood Updated: 06/09/22 2125     Glucose 179 mg/dL      Comment: Meter: [id number] : [id number] RAJ HERNANDEZ        POC Glucose Once [[id number]]  (Abnormal) Collected: 06/09/22 1720    Specimen: Blood Updated: 06/09/22 1728     Glucose 251 mg/dL      Comment: Meter: [id number] : [id number] GOMEZ ARMANDO                  Radiology:    Imaging Results (Last 72 Hours)     ** No results found for the last 72 hours. **          Results for orders placed during the hospital encounter of 06/07/22    Adult Transthoracic Echo Complete W/ Cont if Necessary Per Protocol    Interpretation Summary  · Left ventricular ejection fraction appears to be 61 - 65%. Left ventricular systolic function is normal.  · Left ventricular diastolic function is consistent with (grade II w/high LAP) pseudonormalization.  · This is a technically limited study.      Assessment/Plan:   Chest discomfort, consistent with angina, cardiac catheterization results noted.  Medications continue to be optimized by cardiology.  Despite this patient continues to complain of intermittent discomfort.  I discussed with [facility] cardiology NP who has been in discussion with .  Patient is on DAPT as well as now Imdur, Ranexa, he is also on beta-blocker, and statin.  Unfortunately I was never able to get a hold of his thoracic surgeon yesterday.  Patient's films have been reviewed by  however and he thinks that this lesion potentially could be stented in his LAD.  He is deciding about the need for transfer.     Diabetes, patient is on high doses of U-500 insulin, glucose has been controlled, he has a freestyle emily in place that he can check his glucoses, follow     Hypertension, controlled     DVT prophylaxis, patient is on subcu Lovenox     Dyslipidemia, lipid status is excellent.    Disposition not yet determined    Denys Johns MD

## 2024-04-16 NOTE — PATIENT INSTRUCTIONS
Linaclotide oral capsules  What is this medicine?  LINACLOTIDE (mildred a KLOE tide) is used to treat irritable bowel syndrome (IBS) with constipation as the main problem. It may also be used for relief of chronic constipation.  This medicine may be used for other purposes; ask your health care provider or pharmacist if you have questions.  COMMON BRAND NAME(S): Linzess  What should I tell my health care provider before I take this medicine?  They need to know if you have any of these conditions:  -history of stool (fecal) impaction  -now have diarrhea or have diarrhea often  -other medical condition  -stomach or intestinal disease, including bowel obstruction or abdominal adhesions  -an unusual or allergic reaction to linaclotide, other medicines, foods, dyes, or preservatives  -pregnant or trying to get pregnant  -breast-feeding  How should I use this medicine?  Take this medicine by mouth with a glass of water. Follow the directions on the prescription label. Do not cut, crush or chew this medicine. Take on an empty stomach, at least 30 minutes before your first meal of the day. Take your medicine at regular intervals. Do not take your medicine more often than directed. Do not stop taking except on your doctor's advice.  A special MedGuide will be given to you by the pharmacist with each prescription and refill. Be sure to read this information carefully each time.  Talk to your pediatrician regarding the use of this medicine in children. This medicine is not approved for use in children.  Overdosage: If you think you have taken too much of this medicine contact a poison control center or emergency room at once.  NOTE: This medicine is only for you. Do not share this medicine with others.  What if I miss a dose?  If you miss a dose, just skip that dose. Wait until your next dose, and take only that dose. Do not take double or extra doses.  What may interact with this medicine?  -certain medicines for bowel problems  or bladder incontinence (these can cause constipation)  This list may not describe all possible interactions. Give your health care provider a list of all the medicines, herbs, non-prescription drugs, or dietary supplements you use. Also tell them if you smoke, drink alcohol, or use illegal drugs. Some items may interact with your medicine.  What should I watch for while using this medicine?  Visit your doctor for regular check ups. Tell your doctor if your symptoms do not get better or if they get worse.  Diarrhea is a common side effect of this medicine. It often begins within 2 weeks of starting this medicine. Stop taking this medicine and call your doctor if you get severe diarrhea.  Stop taking this medicine and call your doctor or go to the nearest hospital emergency room right away if you develop unusual or severe stomach-area (abdominal) pain, especially if you also have bright red, bloody stools or black stools that look like tar.  What side effects may I notice from receiving this medicine?  Side effects that you should report to your doctor or health care professional as soon as possible:  -allergic reactions like skin rash, itching or hives, swelling of the face, lips, or tongue  -black, tarry stools  -bloody or watery diarrhea  -new or worsening stomach pain  -severe or prolonged diarrhea  Side effects that usually do not require medical attention (report to your doctor or health care professional if they continue or are bothersome):  -bloating  -gas  -loose stools  This list may not describe all possible side effects. Call your doctor for medical advice about side effects. You may report side effects to FDA at 6-263-FDA-8855.  Where should I keep my medicine?  Keep out of the reach of children.  Store at room temperature between 20 and 25 degrees C (68 and 77 degrees F). Keep this medicine in the original container. Keep tightly closed in a dry place. Do not remove the desiccant packet from the bottle,  it helps to protect your medicine from moisture. Throw away any unused medicine after the expiration date.  NOTE: This sheet is a summary. It may not cover all possible information. If you have questions about this medicine, talk to your doctor, pharmacist, or health care provider.  © 2018 [employer]/Gold Standard (2017-01-19 12:17:04)     Procedure To Be Performed At Next Visit: Excision X Size Of Lesion In Cm (Optional): 0 Detail Level: Zone Size Of Lesion In Cm (Optional): 3 Introduction Text (Please End With A Colon): The following was deferred:

## 2024-05-13 NOTE — PLAN OF CARE
"Goal Outcome Evaluation:  Plan of Care Reviewed With: patient   Pt alert and oriented. VSS on 2LNC at this time. Pt went for stress test this AM. Pt will be NPO at midnight per Cardiology, possible intervention in AM. Pt states he has \"slight discomfort\", denies chest pain. Call light is within reach. Will continue to monitor.      Progress: no change     " 2 = A lot of assistance

## (undated) DEVICE — SENSR O2 OXIMAX FNGR A/ 18IN NONSTR

## (undated) DEVICE — CANN HYDRODISSECTION

## (undated) DEVICE — NDL FLTR2 THN 19G 1IN

## (undated) DEVICE — PAD GRND REM POLYHESIVE A/ DISP

## (undated) DEVICE — CATH F5 INF PIG145 110CM 6SH: Brand: INFINITI

## (undated) DEVICE — CONNECTOR PH 6-IN-1 Y ST: Brand: CARDINAL HEALTH

## (undated) DEVICE — CANN VESL DLP 1WY BLNT/TP 3MM

## (undated) DEVICE — UNDYED BRAIDED (POLYGLACTIN 910), SYNTHETIC ABSORBABLE SUTURE: Brand: COATED VICRYL

## (undated) DEVICE — BLOOD TRANSFUSION FILTER: Brand: HAEMONETICS

## (undated) DEVICE — PENCL ES MEGADINE EZ/CLEAN BUTN W/HOLSTR 10FT

## (undated) DEVICE — GUIDELINER CATHETERS ARE INTENDED TO BE USED IN CONJUNCTION WITH GUIDE CATHETERS TO ACCESS DISCRETE REGIONS OF THE CORONARY AND/OR PERIPHERAL VASCULATURE, AND TO FACILITATE PLACEMENT OF INTERVENTIONAL DEVICES.: Brand: GUIDELINER® V3 CATHETER

## (undated) DEVICE — Device

## (undated) DEVICE — LEX NEURO ANGIOGRAPHY: Brand: MEDLINE INDUSTRIES, INC.

## (undated) DEVICE — SUT PROLN 7/0 BV1 D/A 24IN 8702H

## (undated) DEVICE — WIPE THERAWASH SLV SPEC CARE 2PK

## (undated) DEVICE — CANN IRR/INJ AIR ANT CHAMBER 6MM BEND 27G

## (undated) DEVICE — GLV SURG BIOGEL M LTX PF 6 1/2

## (undated) DEVICE — GLV SURG SENSICARE W/ALOE PF LF 7 STRL

## (undated) DEVICE — SUT SILK 3/0 TIES 18IN A184H

## (undated) DEVICE — PRESSURE MONITORING ACCESSORY: Brand: TRUWAVE

## (undated) DEVICE — BLAKE CARDIO CONNECTOR 1:1: Brand: J-VAC

## (undated) DEVICE — Device: Brand: MEDEX

## (undated) DEVICE — DRSNG GZ PETROLTM XEROFORM CURAD 1X8IN STRL

## (undated) DEVICE — CLEAR CORNEAL KNIFE 2.4MM ANG: Brand: SHARPOINT

## (undated) DEVICE — GLV SURG SENSICARE W/ALOE PF LF 8.5 STRL

## (undated) DEVICE — PRESSURE MONITORING SET: Brand: TRUWAVE, VAMP

## (undated) DEVICE — ENDOSCOPY PORT CONNECTOR FOR OLYMPUS® SCOPES: Brand: ERBE

## (undated) DEVICE — SUT SILK 2/0 TIES 18IN A185H

## (undated) DEVICE — PK HEART OPN 10

## (undated) DEVICE — SINGLE USE MEDICAL DEVICE FOR OPHTHALMIC SURGERY: Brand: 23G ASP HANDPIECE ROUGH 12/BOX

## (undated) DEVICE — LN INJ CONTRST FLXCIL HP F/M LL 1200PSI10

## (undated) DEVICE — 12 FOOT DISPOSABLE EXTENSION CABLE WITH SAFE CONNECT / SCREW-DOWN

## (undated) DEVICE — RICH MAJOR PROCEDURE: Brand: MEDLINE INDUSTRIES, INC.

## (undated) DEVICE — DEV COMP RAD PRELUDESYNC 24CM

## (undated) DEVICE — RUNTHROUGH NS EXTRA FLOPPY PTCA GUIDEWIRE: Brand: RUNTHROUGH

## (undated) DEVICE — PREP SOL POVIDONE/IODINE BT 4OZ

## (undated) DEVICE — Device: Brand: PERFECTCUT ROTATING AORTIC PUNCH

## (undated) DEVICE — CANN AORT ROOT DLP VNT 14G 7F

## (undated) DEVICE — ST INF PRI SMRTSTE 20DRP 2VLV 24ML 117

## (undated) DEVICE — ELECTRD PAD DEFIB A/

## (undated) DEVICE — SOL IRRIG H2O 1000ML STRL

## (undated) DEVICE — EZ GLIDE AORTIC CANNULA: Brand: EDWARDS LIFESCIENCES EZ GLIDE AORTIC CANNULA

## (undated) DEVICE — SLV SCD CALF HEMOFORCE DVT THERP REPROC MD

## (undated) DEVICE — INTRAOPERATIVE COVER KIT, 10 PACK: Brand: SITE-RITE

## (undated) DEVICE — 2963 MEDIPORE SOFT CLOTH TAPE 3 IN X 10 YD 12 RLS/CS: Brand: 3M™ MEDIPORE™

## (undated) DEVICE — SUT SILK 2/0 SUTUPAK TIES 24IN SA75H

## (undated) DEVICE — HYBRID TUBING/CAP SET FOR OLYMPUS® SCOPES: Brand: ERBE

## (undated) DEVICE — STPCK LP 1WY RA 200PSI

## (undated) DEVICE — ROTATING HEMOSTATIC VALVE .096": Brand: RHV

## (undated) DEVICE — 2000CC GUARDIAN II: Brand: GUARDIAN

## (undated) DEVICE — FLTR HME STR UNIV W/SMPL PORT

## (undated) DEVICE — VLV SXN AIR/H2O ORCAPOD3 1P/U STRL

## (undated) DEVICE — ANTIBACTERIAL UNDYED BRAIDED (POLYGLACTIN 910), SYNTHETIC ABSORBABLE SUTURE: Brand: COATED VICRYL

## (undated) DEVICE — SNAR POLYP SENSATION STDOVL 27 240 BX40

## (undated) DEVICE — BNDG ELAS CO-FLEX SLF ADHR 6IN 5YD LF STRL

## (undated) DEVICE — DRSNG WND GZ PAD BORDERED LF 4X5IN STRL

## (undated) DEVICE — SUT PROLN SURGILENE SURGIPRO 8 0 24IN BL

## (undated) DEVICE — MEDI-VAC YANKAUER SUCTION HANDLE W/BULBOUS TIP: Brand: CARDINAL HEALTH

## (undated) DEVICE — SUT PROLN CARDIO V5 3/0 36IN 8936H

## (undated) DEVICE — SOL IRRIG NACL 9PCT 1000ML BTL

## (undated) DEVICE — BNDG ELAS ELITE V/CLOSE 3IN 5YD LF STRL

## (undated) DEVICE — CATH F5 INF JL 4 100CM: Brand: INFINITI

## (undated) DEVICE — SINGLE USE MEDICAL DEVICE FOR OPHTHALMIC SURGERY: Brand: 23G IRR HANDPIECE SMOOTH 12B

## (undated) DEVICE — STPCK 3/WY HP M/RA W/OFF/HNDL 1050PSI STRL

## (undated) DEVICE — ANGIOGRAPHIC CATHETER: Brand: EXPO™

## (undated) DEVICE — TRAP,MUCUS SPECIMEN,40CC: Brand: MEDLINE

## (undated) DEVICE — ADULT DISPOSABLE SINGLE-PATIENT USE PULSE OXIMETER SENSOR: Brand: NONIN

## (undated) DEVICE — PK CATARACT OPTHAMALOGY

## (undated) DEVICE — SAFETY SCALPEL: Brand: DEROYAL

## (undated) DEVICE — PK KN ARTHSCP 20

## (undated) DEVICE — ENCORE® LATEX MICRO SIZE 8, STERILE LATEX POWDER-FREE SURGICAL GLOVE: Brand: ENCORE

## (undated) DEVICE — NDL HYPO ECLPS SFTY 22G 1 1/2IN

## (undated) DEVICE — 4.5 MM FULL RADIUS STRAIGHT                                    BLADES, POWER/EP-1, YELLOW, PACKAGED                                    6 PER BOX, STERILE: Brand: DYONICS

## (undated) DEVICE — CATH TEMPO 5F BER 100CM: Brand: TEMPO

## (undated) DEVICE — OASIS DRAIN, SINGLE, INLINE & ATS COMPATIBLE: Brand: OASIS

## (undated) DEVICE — GLV SURG SENSICARE ORTHO PF LF 8 STRL

## (undated) DEVICE — CATH F6 ST JR 4 100CM: Brand: SUPERTORQUE

## (undated) DEVICE — CATH F5 INF LCB 100CM: Brand: INFINITI

## (undated) DEVICE — TOWEL,OR,DSP,ST,BLUE,STD,4/PK,20PK/CS: Brand: MEDLINE

## (undated) DEVICE — CATH DIAG IMPULSE FL3.5 6F 100CM

## (undated) DEVICE — SOL LR 1000ML

## (undated) DEVICE — PRESSURE TUBING: Brand: TRUWAVE

## (undated) DEVICE — SUT MNCRYL PLS ANTIB UD 4/0 PS2 18IN

## (undated) DEVICE — TREK CORONARY DILATATION CATHETER 3.0 MM X 20 MM / RAPID-EXCHANGE: Brand: TREK

## (undated) DEVICE — SUT SILK 4/0 TIES 18IN A183H

## (undated) DEVICE — SHEATH INTRO SUPERSHEATH JWIRE .035 6F 11CM

## (undated) DEVICE — SUT SILK 0/0 CT2 18IN C027D

## (undated) DEVICE — GLV SURG SENSICARE W/ALOE PF LF 8 STRL

## (undated) DEVICE — 3M™ IOBAN™ 2 ANTIMICROBIAL INCISE DRAPE 6650EZ: Brand: IOBAN™ 2

## (undated) DEVICE — SUT PROLN 8/0 BV2D CARDIO 18IN 8730H

## (undated) DEVICE — GLV SURG SENSICARE SLT PF LF 8 STRL

## (undated) DEVICE — DRSNG SURESITE WNDW 4X4.5

## (undated) DEVICE — PK CATH CARD 70

## (undated) DEVICE — TBG INFLOW FMS DUO W/O 1WY VLV

## (undated) DEVICE — SOL NS 500ML

## (undated) DEVICE — SUT PROLN 5/0 RB1 D/A 36IN 8556H

## (undated) DEVICE — 3M™ STERI-DRAPE™ U-DRAPE 1015: Brand: STERI-DRAPE™

## (undated) DEVICE — KT NOVA WTRANSD 60 IN

## (undated) DEVICE — MEDI-VAC NON-CONDUCTIVE SUCTION TUBING: Brand: CARDINAL HEALTH

## (undated) DEVICE — ADAPT ANTEGRADE RETRGR

## (undated) DEVICE — GW INQWIRE FC PTFE STD J/1.5 .035 260

## (undated) DEVICE — SUT SILK 2 SUTUPAK TIE 60IN SA8H 2STRAND

## (undated) DEVICE — TR BAND RADIAL ARTERY COMPRESSION DEVICE: Brand: TR BAND

## (undated) DEVICE — CATHETER,FOLEY,100%SILICONE,18FR,10ML,LF: Brand: MEDLINE

## (undated) DEVICE — MODEL AT P65, P/N 701554-001KIT CONTENTS: HAND CONTROLLER, 3-WAY HIGH-PRESSURE STOPCOCK WITH ROTATING END AND PREMIUM HIGH-PRESSURE TUBING: Brand: ANGIOTOUCH® KIT

## (undated) DEVICE — KT POSTOP CATARACT PT CARE

## (undated) DEVICE — AIRWY SZ11

## (undated) DEVICE — PK THORACOTOMY 10

## (undated) DEVICE — LUBE JELLY PK/2.75GM STRL BX/144

## (undated) DEVICE — Device: Brand: OMNIWIRE PRESSURE GUIDE WIRE

## (undated) DEVICE — NDL ANGIOGR ADV THN SMOTH SGLWALL 21G 1.5

## (undated) DEVICE — CATH ANGIO SIM2 HNB5.0 .038 100 P NS

## (undated) DEVICE — DISH PETRI 3.5IN MD STRL LF

## (undated) DEVICE — CATH F5 INF JR 4 100CM: Brand: INFINITI

## (undated) DEVICE — T-DRAPE,EXTREMITY,STERILE: Brand: MEDLINE

## (undated) DEVICE — COVADERM PLUS: Brand: DEROYAL

## (undated) DEVICE — GLIDESHEATH SLENDER STAINLESS STEEL KIT: Brand: GLIDESHEATH SLENDER

## (undated) DEVICE — KT MINI ACC MAK COAXL 5F 10CM

## (undated) DEVICE — QUICK CATCH IN-LINE SUCTION POLYP TRAP IS USED FOR SUCTION RETRIEVAL OF ENDOSCOPICALLY REMOVED POLYPS.

## (undated) DEVICE — PAD LVL SENSR MOUNTING

## (undated) DEVICE — LN FLTR ORL/NASL MICROSTREAM NONINTUB A/LNG

## (undated) DEVICE — SYR LUERLOK 30CC

## (undated) DEVICE — PROB TEMP MYOCARDIAL 18MM

## (undated) DEVICE — FLTR GAS LN OXYGENATOR 1/4IN STRL

## (undated) DEVICE — 0.8MM CLEARPORT PARA KNIFE: Brand: SHARPOINT

## (undated) DEVICE — SUT PROLN 3/0 V7 D/A 36IN 8976H

## (undated) DEVICE — CANNULA,ADULT,SOFT-TOUCH,7TUBE,SC: Brand: MEDLINE

## (undated) DEVICE — SKIN AFFIX SURG ADHESIVE 72/CS 0.55ML: Brand: MEDLINE

## (undated) DEVICE — SPNG GZ WOVN 4X4IN 12PLY 10/BX STRL

## (undated) DEVICE — VASOVIEW HEMOPRO: Brand: VASOVIEW HEMOPRO

## (undated) DEVICE — GW INQW FIX/CORE PTFE J/3MM .035 260CM

## (undated) DEVICE — MYNXGRIP 6F/7F: Brand: MYNXGRIP

## (undated) DEVICE — INTRO SHEATH PRELUDE IDEAL SPRNG COIL 021 6F 23X80CM

## (undated) DEVICE — SPNG GZ STRL 2S 4X4 12PLY

## (undated) DEVICE — TUBING, SUCTION, 1/4" X 10', STRAIGHT: Brand: MEDLINE

## (undated) DEVICE — TP MICROFM 3IN LF

## (undated) DEVICE — PROXIMATE SKIN STAPLERS (35 WIDE) CONTAINS 35 STAINLESS STEEL STAPLES (FIXED HEAD): Brand: PROXIMATE

## (undated) DEVICE — ST EXT IV SMARTSITE 2VLV SP M LL 5ML IV1

## (undated) DEVICE — TAPE MICROFM 2IN LF

## (undated) DEVICE — SUT PROLN 4/0 V7 36IN 8975H

## (undated) DEVICE — SUT VICRYL 3/0 CT1 27IN J258H

## (undated) DEVICE — SUT STL 2/0 CV SH 24IN TPW32

## (undated) DEVICE — SUT SILK 0 SH 30IN K834H

## (undated) DEVICE — CATH F5 INF JL 4.5 100CM: Brand: INFINITI

## (undated) DEVICE — SUT VIC 2/0 CT1 27IN J259H

## (undated) DEVICE — SENSR CERBRL O2 SOMASENSOR ADHS A/ LF

## (undated) DEVICE — PK SPECIALTYCARE M/STATE 1 OH 1/2V CUST

## (undated) DEVICE — A2000 MULTI-USE SYRINGE KIT, P/N 701277-003KIT CONTENTS: 100ML CONTRAST RESERVOIR AND TUBING WITH CONTRAST SPIKE AND CLAMP: Brand: A2000 MULTI-USE SYRINGE KIT

## (undated) DEVICE — CUFF SCD HEMOFORCE SEQ CALF STD MD

## (undated) DEVICE — BG TRANSF W/COUPLER SPK 600ML

## (undated) DEVICE — HEMOCONCENTRATOR CAPIOX PED SM W/TB

## (undated) DEVICE — SUT ETHIB 1 CT1 30IN  X425H

## (undated) DEVICE — SOL NACL 0.9PCT 1000ML

## (undated) DEVICE — BOWL UTIL STRL 32OZ

## (undated) DEVICE — KT FAST STRT ATF120

## (undated) DEVICE — 6F .070 JR 4 100CM: Brand: CORDIS

## (undated) DEVICE — SUT PROLN 6/0 C1 D/A 30IN 8706H

## (undated) DEVICE — SUT MNCRYL 3/0 SH 27 IN Y416H

## (undated) DEVICE — GLV SURG SENSICARE LT W/ALOE PF LF 8 STRL